# Patient Record
Sex: MALE | Race: WHITE | Employment: OTHER | URBAN - METROPOLITAN AREA
[De-identification: names, ages, dates, MRNs, and addresses within clinical notes are randomized per-mention and may not be internally consistent; named-entity substitution may affect disease eponyms.]

---

## 2017-03-09 ENCOUNTER — IP HISTORICAL/CONVERTED ENCOUNTER (OUTPATIENT)
Dept: OTHER | Age: 62
End: 2017-03-09

## 2017-07-20 ENCOUNTER — IP HISTORICAL/CONVERTED ENCOUNTER (OUTPATIENT)
Dept: OTHER | Age: 62
End: 2017-07-20

## 2017-09-22 ENCOUNTER — IP HISTORICAL/CONVERTED ENCOUNTER (OUTPATIENT)
Dept: OTHER | Age: 62
End: 2017-09-22

## 2018-03-05 ENCOUNTER — IP HISTORICAL/CONVERTED ENCOUNTER (OUTPATIENT)
Dept: OTHER | Age: 63
End: 2018-03-05

## 2018-04-04 ENCOUNTER — IP HISTORICAL/CONVERTED ENCOUNTER (OUTPATIENT)
Dept: OTHER | Age: 63
End: 2018-04-04

## 2018-09-19 ENCOUNTER — OP HISTORICAL/CONVERTED ENCOUNTER (OUTPATIENT)
Dept: OTHER | Age: 63
End: 2018-09-19

## 2018-11-07 ENCOUNTER — ED HISTORICAL/CONVERTED ENCOUNTER (OUTPATIENT)
Dept: OTHER | Age: 63
End: 2018-11-07

## 2019-08-05 ENCOUNTER — ED HISTORICAL/CONVERTED ENCOUNTER (OUTPATIENT)
Dept: OTHER | Age: 64
End: 2019-08-05

## 2019-09-05 ENCOUNTER — OP HISTORICAL/CONVERTED ENCOUNTER (OUTPATIENT)
Dept: OTHER | Age: 64
End: 2019-09-05

## 2019-09-16 ENCOUNTER — IP HISTORICAL/CONVERTED ENCOUNTER (OUTPATIENT)
Dept: OTHER | Age: 64
End: 2019-09-16

## 2019-09-25 ENCOUNTER — IP HISTORICAL/CONVERTED ENCOUNTER (OUTPATIENT)
Dept: OTHER | Age: 64
End: 2019-09-25

## 2020-05-27 ENCOUNTER — OP HISTORICAL/CONVERTED ENCOUNTER (OUTPATIENT)
Dept: OTHER | Age: 65
End: 2020-05-27

## 2020-06-12 ENCOUNTER — OP HISTORICAL/CONVERTED ENCOUNTER (OUTPATIENT)
Dept: OTHER | Age: 65
End: 2020-06-12

## 2020-06-16 ENCOUNTER — OP HISTORICAL/CONVERTED ENCOUNTER (OUTPATIENT)
Dept: OTHER | Age: 65
End: 2020-06-16

## 2020-06-22 ENCOUNTER — IP HISTORICAL/CONVERTED ENCOUNTER (OUTPATIENT)
Dept: OTHER | Age: 65
End: 2020-06-22

## 2020-08-17 ENCOUNTER — OP HISTORICAL/CONVERTED ENCOUNTER (OUTPATIENT)
Dept: OTHER | Age: 65
End: 2020-08-17

## 2020-08-24 ENCOUNTER — OP HISTORICAL/CONVERTED ENCOUNTER (OUTPATIENT)
Dept: OTHER | Age: 65
End: 2020-08-24

## 2020-08-31 ENCOUNTER — OP HISTORICAL/CONVERTED ENCOUNTER (OUTPATIENT)
Dept: OTHER | Age: 65
End: 2020-08-31

## 2020-09-14 ENCOUNTER — HOSPITAL ENCOUNTER (OUTPATIENT)
Dept: WOUND CARE | Age: 65
Discharge: HOME | End: 2020-09-14
Payer: MEDICARE

## 2020-09-14 PROBLEM — L97.929 IDIOPATHIC CHRONIC VENOUS HYPERTENSION OF LEFT LOWER EXTREMITY WITH ULCER (HCC): Status: ACTIVE | Noted: 2020-09-14

## 2020-09-14 PROBLEM — I87.312 IDIOPATHIC CHRONIC VENOUS HYPERTENSION OF LEFT LOWER EXTREMITY WITH ULCER (HCC): Status: ACTIVE | Noted: 2020-09-14

## 2020-09-14 PROCEDURE — 99212 OFFICE O/P EST SF 10 MIN: CPT

## 2020-09-14 RX ORDER — INSULIN GLARGINE 100 [IU]/ML
INJECTION, SOLUTION SUBCUTANEOUS
COMMUNITY
End: 2022-03-02

## 2020-09-14 RX ORDER — OXYCODONE HYDROCHLORIDE 5 MG/1
5 TABLET ORAL EVERY 12 HOURS
COMMUNITY
End: 2022-07-29

## 2020-09-14 RX ORDER — FUROSEMIDE 40 MG/1
40 TABLET ORAL DAILY
COMMUNITY
End: 2021-03-08

## 2020-09-14 RX ORDER — INSULIN LISPRO 100 [IU]/ML
INJECTION, SOLUTION INTRAVENOUS; SUBCUTANEOUS
COMMUNITY

## 2020-09-14 RX ORDER — GABAPENTIN 300 MG/1
300 CAPSULE ORAL 4 TIMES DAILY
COMMUNITY

## 2020-09-14 RX ORDER — FLUNISOLIDE 0.25 MG/ML
2 SOLUTION NASAL 2 TIMES DAILY
COMMUNITY

## 2020-09-14 RX ORDER — ALBUTEROL SULFATE 90 UG/1
1 AEROSOL, METERED RESPIRATORY (INHALATION)
COMMUNITY

## 2020-09-14 RX ORDER — GUAIFENESIN 100 MG/5ML
81 LIQUID (ML) ORAL DAILY
COMMUNITY

## 2020-09-14 RX ORDER — METFORMIN HYDROCHLORIDE 500 MG/1
1000 TABLET ORAL 2 TIMES DAILY WITH MEALS
COMMUNITY
End: 2022-07-06

## 2020-09-14 RX ORDER — HYDROCHLOROTHIAZIDE 25 MG/1
25 TABLET ORAL DAILY
COMMUNITY
End: 2021-06-09

## 2020-09-14 RX ORDER — AMLODIPINE BESYLATE 5 MG/1
5 TABLET ORAL DAILY
COMMUNITY
End: 2021-03-08

## 2020-09-14 RX ORDER — COLCHICINE 0.6 MG/1
0.6 TABLET ORAL DAILY
COMMUNITY

## 2020-09-14 NOTE — DISCHARGE INSTRUCTIONS
Discharge Instructions for  [facility], [facility]  Telephone: [phone]    NAME:  Rhys Orozco OF BIRTH:  1955  MEDICAL RECORD NUMBER:  [id number]  DATE:  9/14/2020    Wound Cleansing:   Do not scrub or use excessive force. Cleanse wound prior to applying a clean dressing with:  [] Normal Saline [] Keep Wound Dry in Shower    [] Wound Cleanser   [x] Cleanse wound with Mild Soap & Water  [] May Shower at Discharge   [] Other:           Dressings:           Wound Location Left Leg  [] Apply Primary Dressing:       -Dakins Wet to Dry Dressing  [x] Cover and Secure with:     [x] Gauze [x] Cristian [] Kerlix   [x] Ace Wrap [] Cover Roll Tape [] ABD     [] Other:    Avoid contact of tape with skin. [x] Change dressing: [x] Daily    [] Every Other Day [] Three times per week   [] Once a week [] Do Not Change Dressing   [] Other:     Activity:  [x] Activity as tolerated:  [] Patient has no activity restrictions     [] Strict Bedrest: [] Remain off Work:     [] May return to full duty work:                                   [] Return to work with restrictions:             Return Appointment:  [] Wound and dressing supply provider:   [x] SELAM or [address]   [x] Return Appointment: With Dr. Edin Neil  in 1Week     Electronically signed Saman Godinez RN on 9/14/2020 at 2:29 PM     Kiah Mathews 281: Should you experience any significant changes in your wound(s) or have questions about your wound care, please contact the [address] [place] [address] at [address] [place] [address] 8:00 am - 4:30. If you need help with your wound outside these hours and cannot wait until we are again available, contact your PCP or go to the hospital emergency room. PLEASE NOTE: IF YOU ARE UNABLE TO OBTAIN WOUND SUPPLIES, CONTINUE TO USE THE SUPPLIES YOU HAVE AVAILABLE UNTIL YOU ARE ABLE TO REACH US.  IT IS MOST IMPORTANT TO KEEP THE WOUND COVERED AT ALL TIMES.     [x] Dr. Bacilio Brown   [] Dr. Jerson Hernandez  [] [facility]  [] Dr. Hina Fox

## 2020-09-14 NOTE — WOUND CARE
[address]   Progress Note and Procedure Note   NO Procedure      [address] RECORD NUMBER:  [id number]  AGE: 71 y.o. RACE WHITE OR   GENDER: male  : 1955  EPISODE DATE:  2020    Subjective:     Chief Complaint   Patient presents with    Wound Check         HISTORY of PRESENT ILLNESS HPI    [email or web address] Jovanni Cook is a 71 y.o. male who presents today for wound/ulcer evaluation. 72 yo diabetic male with L lateral leg venous ulcer. He was hospitalized at [facility] from 20 to 2020 fro treatment of LLE cellulitis. He was treated with IV antibiotics, discharged on Augmentin. One month ago he was treated with doxycycline for the L leg ulcer. A1c 2020 6.9. About two years ago, he underwent venous ablation of the LLE by Dr. Rhett Ellsworth. Wound/Ulcer Pain Timing/Severity: moderate  Quality of pain: burning  Severity:  5 / 10   Modifying Factors: Pain worsens with walking  Associated Signs/Symptoms: edema    Ulcer Identification:  Ulcer Type: non-healing surgical    Contributing Factors: edema    Wound: L leg         PAST MEDICAL HISTORY    No past medical history on file. PAST SURGICAL HISTORY    No past surgical history on file. FAMILY HISTORY    No family history on file. SOCIAL HISTORY    Social History     Tobacco Use    Smoking status: Not on file   Substance Use Topics    Alcohol use: Not on file    Drug use: Not on file       ALLERGIES    Allergies   Allergen Reactions    Elavil Unknown (comments)    Sulfa (Sulfonamide Antibiotics) Nausea and Vomiting       MEDICATIONS    No current outpatient medications on file prior to encounter. No current facility-administered medications on file prior to encounter. REVIEW OF SYSTEMS    Pertinent items are noted in HPI. Objective:     [email or web address] were no vitals taken for this visit.     Wt Readings from Last 3 Encounters:   No data found for [employer] PHYSICAL EXAM    Pertinent items are noted in HPI. Assessment:      Problem List Items Addressed This Visit     None          Procedure Note  Indications:  Based on my examination of this patient's wound(s)/ulcer(s) today, debridement is not required to promote healing and evaluate the wound base. Wound Leg lower Left;Lateral (Active)   Dressing Status Clean, dry, and intact; Removed 09/14/20 1413   Dressing Type 4 x 4 09/14/20 1413   Non-staged Wound Description Full thickness 09/14/20 1413   Wound Length (cm) 6.1 cm 09/14/20 1413   Wound Width (cm) 4.2 cm 09/14/20 1413   Wound Depth (cm) 0.2 cm 09/14/20 1413   Wound Surface Area (cm^2) 25.62 cm^2 09/14/20 1413   Wound Volume (cm^3) 5.12 cm^3 09/14/20 1413   Condition of Base Granulation;Slough 09/14/20 1413   Drainage Amount Large 09/14/20 1413   Drainage Color Serosanguinous 09/14/20 1413   Wound Odor None 09/14/20 1413   Margins Defined edges 09/14/20 1413   Cleansing and Cleansing Agents  Soap and water 09/14/20 1413   Number of days: 0        Plan:     Treatment Note please see attached Discharge Instructions    Written patient dismissal instructions given to patient or POA.          Electronically signed by Yancy Talavera MD on 9/14/2020 at 2:30 PM

## 2020-09-21 ENCOUNTER — HOSPITAL ENCOUNTER (OUTPATIENT)
Dept: WOUND CARE | Age: 65
Discharge: HOME | End: 2020-09-21
Payer: MEDICARE

## 2020-09-21 VITALS — TEMPERATURE: 98.5 F | SYSTOLIC BLOOD PRESSURE: 124 MMHG | DIASTOLIC BLOOD PRESSURE: 75 MMHG | HEART RATE: 99 BPM

## 2020-09-21 PROBLEM — R60.0 LOCALIZED EDEMA: Status: ACTIVE | Noted: 2020-09-21

## 2020-09-21 PROCEDURE — 11045 DBRDMT SUBQ TISS EACH ADDL: CPT

## 2020-09-21 PROCEDURE — 11042 DBRDMT SUBQ TIS 1ST 20SQCM/<: CPT

## 2020-09-21 NOTE — WOUND CARE
Debridement Wound Care Problem List Items Addressed This Visit None Right ankle ulcer Procedure Note Indications:  Based on my examination of this patient's wound(s)/ulcer(s) today, debridement is required to promote healing and evaluate the wound base. Performed by: Timbo Fierro MD 
 
Consent obtained: Yes Time out taken: Yes Debridement: Excisional 
 
Using curette the wound(s)/ulcer(s) was/were sharply debrided down through and including the removal of   
subcutaneous tissue Devitalized Tissue Debrided: slough Pre Debridement Measurements: 
Are located in the [place]  Documentation Flow Sheet Wound/Ulcer #: 1 Post Debridement Measurements: 
Wound/Ulcer Descriptions are Pre Debridement except measurements:Non-Pressure ulcer, fat layer exposed Wound Leg lower Left;Lateral (Active) Dressing Status Clean;Dry; Intact 09/21/20 1409 Dressing Type 4 x 4;ABD pad; Wet to dry 09/21/20 1409 Non-staged Wound Description Full thickness 09/21/20 1409 Rubalcava Grading Scale 0-5  Grade 2 09/21/20 1409 Wound Length (cm) 5.5 cm 09/21/20 1409 Wound Width (cm) 4.8 cm 09/21/20 1409 Wound Depth (cm) 0.2 cm 09/21/20 1409 Wound Surface Area (cm^2) 26.4 cm^2 09/21/20 1409 Wound Volume (cm^3) 5.28 cm^3 09/21/20 1409 Change in Wound Size % -3.04 09/21/20 1409 Condition of Base Granulation;Slough 09/14/20 1413 Assessment Clean, dry, and intact 09/21/20 1409 Tissue Type Percent Pink 15 09/21/20 1409 Tissue Type Percent Red 85 09/21/20 1409 Drainage Amount Moderate 09/21/20 1409 Drainage Color Serosanguinous 09/21/20 1409 Wound Odor None 09/21/20 1409 Alissa-wound Assessment Dry; Intact 09/21/20 1409 Margins Attached edges 09/21/20 1409 Cleansing and Cleansing Agents  Normal saline 09/21/20 1409 Dressing Changed Changed/New 09/14/20 1435 Number of days: 7 Percent of Wound(s)/Ulcer(s) Debrided: 90% Total Surface Area Debrided:  25 sq cm Diabetic/Pressure/Non Pressure Ulcers only: 
Ulcer:  
 
Estimated Blood Loss:  None Hemostasis Achieved: Pressure Procedural Pain: 2 / 10 Post Procedural Pain: 2 / 10 Response to treatment: Well tolerated by patient

## 2020-09-21 NOTE — DISCHARGE INSTRUCTIONS
Discharge Instructions for  [address] [place] [address], [address] [place] [address]  Telephone: [phone]    NAME:  Mary Liu OF BIRTH:  1955  MEDICAL RECORD NUMBER:  [id number]  DATE:  9/21/2020      Return Appointment:  [] Dressing supply provider:   [x] Home Healthcare: [facility]  [x] Return Appointment: 1 Week(s)  [] Nurse Visit: Raya Singleton    [] Ordered tests:   [] Referrals:     Wound Cleansing:   Do not scrub or use excessive force. Cleanse wound prior to applying a clean dressing with:  [] Normal Saline   [x] Cleanse wound with Mild Soap & Water    [] Keep Wound Dry in Shower  [] Other:      Topical Treatments:  Do not apply lotions, creams, or ointments to wound bed unless directed. [x] Apply moisturizing lotion to skin surrounding the wound prior to dressing change.  [] Apply antifungal ointment to skin surrounding the wound prior to dressing change.  [] Apply thin film of moisture barrier ointment to skin immediately around wound. [] Other:       Dressings:           Wound Location LLE  [x] Apply Primary Dressing:       [] MediHoney Gel    [] MediHoney Alginate               [] Calcium Alginate      [] Calcium Alginate with Silver   [] Collagen                   [] Collagen with Silver                [] Santyl with Moistened saline gauze              [x] Hydrofera Blue (cut to size and moistened)  [] Hydrofera Blue Ready (Cut to size)   [] NS wet to dry    [] Betadine wet to dry              [] Hydrogel                [] Mepitel     [] Bactroban/Mupirocin               [] Other:      [x] Cover and Secure with:     [x] Gauze [x] Cristian [] Kerlix   [] Foam [] Super Absorbant  [] ABD     [x] ACE Wrap            [] Other:    Avoid contact of tape with skin.     [x] Change dressing: [] Daily    [] Every Other Day [] Once per week   [x] Three times per week [] Do Not Change Dressing   [] Other: Activity:  [x] Activity as tolerated:    [] Patient has no activity restrictions       [] Strict Bedrest:   [] Remain off Work:       [] May return to full duty work:                                     [] Return to work with restrictions:                Electronically signed Merced Bravo RN on 9/21/2020 at 2:29 PM     Kiah Mathews 281: Should you experience any significant changes in your wound(s) or have questions about your wound care, please contact [address] at [place] [address] 8:00 am - 4:30. If you need help with your wound outside of these hours and cannot wait until we are again available, contact your PCP or go to the hospital emergency room. PLEASE NOTE: IF YOU ARE UNABLE TO OBTAIN WOUND SUPPLIES, CONTINUE TO USE THE SUPPLIES YOU HAVE AVAILABLE UNTIL YOU ARE ABLE TO REACH US. IT IS MOST IMPORTANT TO KEEP THE WOUND COVERED AT ALL TIMES.     [x] Dr. Terri Lopez   [] Dr. Richard Murray  [] D[facility]  [] Dr. Tod Mccullough

## 2020-09-21 NOTE — WOUND CARE
[facility] Progress Note and Procedure Note Willie Frederick MEDICAL RECORD NUMBER:  [id number] AGE: 71 y.o. RACE WHITE OR   GENDER: male  : 1955 EPISODE DATE:  2020 Subjective: Chief Complaint Patient presents with  Wound Check LLL Patient has completed his antibiotics, which caused him to have GI upset so he only took 4 days worth. He has been continuing to treat the wound with Dakin solution daily dressing changes. He denies fever, increased pain or increased drainage. HISTORY of PRESENT ILLNESS HPI Willie Frederick is a 71 y.o. male who presents today for wound/ulcer evaluation. History of Wound Context: Wound is the right medial ankle Wound/Ulcer Pain Timing/Severity: moderate Quality of pain: aching Severity:  3 / 10 Modifying Factors: Pain is relieved/improved with rest 
Associated Signs/Symptoms: edema Ulcer Identification: 
Ulcer Type: venous Contributing Factors: edema Wound: R medial ankle PAST MEDICAL HISTORY Past Medical History:  
Diagnosis Date  Arthritis  Chronic obstructive pulmonary disease ([address]  Diabetes [facility] [address]  Gout  Hypertension  Sleep apnea PAST SURGICAL HISTORY Past Surgical History:  
Procedure Laterality Date  HX ORTHOPAEDIC    
 
 
FAMILY HISTORY Family History Problem Relation Age of Onset  Heart Disease Mother  Kidney Disease Mother  Hypertension Mother  Diabetes Mother  Heart Disease Father  Hypertension Father  Diabetes Sister  Diabetes Brother SOCIAL HISTORY Social History Tobacco Use  Smoking status: Never Smoker  Smokeless tobacco: Never Used Substance Use Topics  Alcohol use: Yes  Drug use: Never ALLERGIES Allergies Allergen Reactions  Elavil Unknown (comments)  Sulfa (Sulfonamide Antibiotics) Nausea and Vomiting MEDICATIONS Current Outpatient Medications on File Prior to Encounter Medication Sig Dispense Refill  aspirin 81 mg chewable tablet Take 81 mg by mouth daily.  flunisolide (NASAREL) 25 mcg (0.025 %) spry 2 Sprays two (2) times a day.  albuterol (ProAir HFA) 90 mcg/actuation inhaler Take  by inhalation.  colchicine 0.6 mg tablet Take 0.6 mg by mouth daily.  gabapentin (NEURONTIN) 300 mg capsule Take 300 mg by mouth four (4) times daily.  oxyCODONE IR (ROXICODONE) 5 mg immediate release tablet Take 5 mg by mouth every twelve (12) hours.  furosemide (Lasix) 40 mg tablet Take 40 mg by mouth daily.  amLODIPine (NORVASC) 5 mg tablet Take 5 mg by mouth daily.  metFORMIN (GLUCOPHAGE) 500 mg tablet Take 1,000 mg by mouth two (2) times daily (with meals).  hydroCHLOROthiazide (HYDRODIURIL) 25 mg tablet Take 25 mg by mouth daily.  insulin lispro (HumaLOG U-100 Insulin) 100 unit/mL injection by SubCUTAneous route. Sliding scale  insulin glargine (LANTUS) 100 unit/mL injection by SubCUTAneous route nightly. 80units am/ 20units pm    
 
No current facility-administered medications on file prior to encounter. REVIEW OF SYSTEMS Pertinent items are noted in HPI. Objective:  
 
Visit Vitals /75 (BP 1 Location: Left arm, BP Patient Position: Sitting) Pulse 99 Temp 98.5 °F (36.9 °C) Wt Readings from Last 3 Encounters:  
No data found for [employer] PHYSICAL EXAM 
Right medial ankle ulcer is measuring slightly smaller today, healthy granulation tissue, no evidence of infection. Pertinent items are noted in HPI. Assessment:  
Slowly improving Problem List Items Addressed This Visit None POP IN PROCEDURE TYPE (DEBRIDEMENT, BIOPSY, I&D, SKIN SUB, CHEMICAL CAUERTY) Plan:  
Discontinue Dakin solution, begin treatment with Hydrofera Blue, Ace wrap compression. Treatment Note please see attached Discharge Instructions Written patient dismissal instructions given to patient or POA.       
 
Electronically signed by Vika Claros MD on 9/21/2020 at 2:29 PM

## 2020-09-28 ENCOUNTER — HOSPITAL ENCOUNTER (OUTPATIENT)
Dept: WOUND CARE | Age: 65
Discharge: HOME | End: 2020-09-28
Payer: MEDICARE

## 2020-09-28 VITALS
OXYGEN SATURATION: 99 % | RESPIRATION RATE: 20 BRPM | DIASTOLIC BLOOD PRESSURE: 94 MMHG | SYSTOLIC BLOOD PRESSURE: 141 MMHG | TEMPERATURE: 99.3 F | HEART RATE: 101 BPM

## 2020-09-28 DIAGNOSIS — L97.929 IDIOPATHIC CHRONIC VENOUS HYPERTENSION OF LEFT LOWER EXTREMITY WITH ULCER (HCC): ICD-10-CM

## 2020-09-28 DIAGNOSIS — I87.312 IDIOPATHIC CHRONIC VENOUS HYPERTENSION OF LEFT LOWER EXTREMITY WITH ULCER (HCC): ICD-10-CM

## 2020-09-28 DIAGNOSIS — R60.0 LOCALIZED EDEMA: ICD-10-CM

## 2020-09-28 PROCEDURE — 11045 DBRDMT SUBQ TISS EACH ADDL: CPT

## 2020-09-28 PROCEDURE — 11042 DBRDMT SUBQ TIS 1ST 20SQCM/<: CPT

## 2020-09-28 NOTE — WOUND CARE
[employer] Below Knee NAME:  Jh Guy YOB: 1955 MEDICAL RECORD NUMBER:  [id number] DATE:  9/28/2020 Appied primary and secondary dressing as ordered. Applied Unna roll from toes to knee overlapping each time. Applied ace wrap or coban from toes to below the knee. Secured with tape and/or metal clips covered with tape. Instructed patient/caregiver to keep dressing dry and intact. DO NOT REMOVE DRESSING. Instructed pt/family/caregiver to report excessive draining, loose bandage, wet dressing, severe pain or tingling in toes. Applied [employer] dressing below the knee to left lower leg. Unna Boot(s) were applied per  Guidelines.  
 
 Electronically signed by Irma Arreguin RN on 9/28/2020 at 3:02 PM

## 2020-09-28 NOTE — DISCHARGE INSTRUCTIONS
Discharge Instructions for  [facility], [facility]  Telephone: [phone]    NAME:  Vanesa Novak OF BIRTH:  1955  MEDICAL RECORD NUMBER:  [id number]  DATE:  9/28/2020      Return Appointment:  [] Dressing supply provider:   [x] Home Healthcare: [address] [facility]  [x] Return Appointment: 1 Week(s)  [] Nurse Visit: Nery Mcpherson    [] Ordered tests:   [] Referrals:     Wound Cleansing:   Do not scrub or use excessive force. Cleanse wound prior to applying a clean dressing with:  [] Normal Saline   [x] Cleanse wound with Mild Soap & Water    [] Keep Wound Dry in Shower  [] Other:      Topical Treatments:  Do not apply lotions, creams, or ointments to wound bed unless directed. [x] Apply moisturizing lotion to skin surrounding the wound prior to dressing change.  [] Apply antifungal ointment to skin surrounding the wound prior to dressing change.  [] Apply thin film of moisture barrier ointment to skin immediately around wound. [] Other:       Dressings:           Wound Location LLE  [x] Apply Primary Dressing:       [] MediHoney Gel    [] MediHoney Alginate               [] Calcium Alginate      [] Calcium Alginate with Silver   [] Collagen                   [] Collagen with Silver                [] Santyl with Moistened saline gauze              [x] Hydrofera Blue (cut to size and moistened)  [] Hydrofera Blue Ready (Cut to size)   [] NS wet to dry    [] Betadine wet to dry              [] Hydrogel                [] Mepitel     [] Bactroban/Mupirocin               [] Other:      [x] Cover and Secure with:     [x] Gauze [] Larry Peals [] Kerlix   [] Foam [x] Super Absorbant  [] ABD     [] ACE Wrap            [] Other:    Avoid contact of tape with skin.     [x] Change dressing: [] Daily    [] Every Other Day [x] Twice per week   [] Three times per week [] Do Not Change Dressing   [] Other:      Compression:  Apply: [x] Multilayer Compression Wrap Applied in Clinic: []RightLeg [x]Left Leg                                 []Profore   []Profore Lite             [x]Unna     [x] Do not get leg(s) with wrap wet. If wraps become too tight call the center or completely remove the wrap. [x] Elevate leg(s) above the level of the heart when sitting. [x] Avoid prolonged standing in one place. Dietary:  [x] Diet as tolerated: [] Calorie Diabetic Diet: [] No Added Salt:  [] Increase Protein: [] Other:     Activity:  [x] Activity as tolerated:    [] Patient has no activity restrictions       [] Strict Bedrest:   [] Remain off Work:       [] May return to full duty work:                                     [] Return to work with restrictions:                Electronically signed Ruth Arevalo RN on 9/28/2020 at 2:35 PM     Kiah Mathews 281: Should you experience any significant changes in your wound(s) or have questions about your wound care, please contact [address] at [place] [address] 8:00 am - 4:30. If you need help with your wound outside of these hours and cannot wait until we are again available, contact your PCP or go to the hospital emergency room. PLEASE NOTE: IF YOU ARE UNABLE TO OBTAIN WOUND SUPPLIES, CONTINUE TO USE THE SUPPLIES YOU HAVE AVAILABLE UNTIL YOU ARE ABLE TO REACH US. IT IS MOST IMPORTANT TO KEEP THE WOUND COVERED AT ALL TIMES.     [x] Dr. Ok Culp   [] Dr. Di Mccoy  [] Shanda Crane [facility]  [] Dr. Ailin Cole

## 2020-09-28 NOTE — WOUND CARE
[address] Progress Note and Procedure Note Aaron George MEDICAL RECORD NUMBER:  [id number] AGE: 71 y.o. RACE WHITE OR   GENDER: male  : 1955 EPISODE DATE:  2020 Subjective:  
Some drainage, mild pain Did not tolerate Bactrim for Morganella on wound culture Chief Complaint Patient presents with  Wound Check LLE HISTORY of PRESENT ILLNESS HPI Aaron George is a 71 y.o. male who presents today for wound/ulcer evaluation. History of Wound Context: L leg ulcer Wound/Ulcer Pain Timing/Severity: mild Quality of pain: dull Severity:  2 / 10 Modifying Factors: Pain is relieved/improved with rest 
Associated Signs/Symptoms: edema Ulcer Identification: 
Ulcer Type: venous Contributing Factors: edema Wound: L leg PAST MEDICAL HISTORY Past Medical History:  
Diagnosis Date  Arthritis  Chronic obstructive pulmonary disease ([facility] [address]  Diabetes ([facility] [address]  Gout  Hypertension  Sleep apnea PAST SURGICAL HISTORY Past Surgical History:  
Procedure Laterality Date  HX ORTHOPAEDIC    
 
 
FAMILY HISTORY Family History Problem Relation Age of Onset  Heart Disease Mother  Kidney Disease Mother  Hypertension Mother  Diabetes Mother  Heart Disease Father  Hypertension Father  Diabetes Sister  Diabetes Brother SOCIAL HISTORY Social History Tobacco Use  Smoking status: Never Smoker  Smokeless tobacco: Never Used Substance Use Topics  Alcohol use: Yes  Drug use: Never ALLERGIES Allergies Allergen Reactions  Elavil Unknown (comments)  Sulfa (Sulfonamide Antibiotics) Nausea and Vomiting MEDICATIONS Current Outpatient Medications on File Prior to Encounter Medication Sig Dispense Refill  aspirin 81 mg chewable tablet Take 81 mg by mouth daily.  flunisolide (NASAREL) 25 mcg (0.025 %) spry 2 Sprays two (2) times a day.  albuterol (ProAir HFA) 90 mcg/actuation inhaler Take  by inhalation.  colchicine 0.6 mg tablet Take 0.6 mg by mouth daily.  gabapentin (NEURONTIN) 300 mg capsule Take 300 mg by mouth four (4) times daily.  oxyCODONE IR (ROXICODONE) 5 mg immediate release tablet Take 5 mg by mouth every twelve (12) hours.  furosemide (Lasix) 40 mg tablet Take 40 mg by mouth daily.  amLODIPine (NORVASC) 5 mg tablet Take 5 mg by mouth daily.  metFORMIN (GLUCOPHAGE) 500 mg tablet Take 1,000 mg by mouth two (2) times daily (with meals).  hydroCHLOROthiazide (HYDRODIURIL) 25 mg tablet Take 25 mg by mouth daily.  insulin lispro (HumaLOG U-100 Insulin) 100 unit/mL injection by SubCUTAneous route. Sliding scale  insulin glargine (LANTUS) 100 unit/mL injection by SubCUTAneous route nightly. 80units am/ 20units pm    
 
No current facility-administered medications on file prior to encounter. REVIEW OF SYSTEMS Pertinent items are noted in HPI. Objective:  
 
Visit Vitals BP (!) 141/94 (BP 1 Location: Right arm, BP Patient Position: Sitting) Pulse (!) 101 Temp 99.3 °F (37.4 °C) Resp 20 SpO2 99% Comment: on 3 liters Wt Readings from Last 3 Encounters:  
No data found for [employer] PHYSICAL EXAM 
Wound has a healthy appearance, no infection, area slightly increased Pertinent items are noted in HPI. Assessment:  
 no significant improvement Problem List Items Addressed This Visit Idiopathic chronic venous hypertension of left lower extremity with ulcer ([facility] [address] Localized edema POP IN PROCEDURE TYPE (DEBRIDEMENT, BIOPSY, I&D, SKIN SUB, CHEMICAL CAUERTY) Plan:  
Continue HydroFera Blue, start Unna boot, change once a week with [facility], once a week here Leg elevation Treatment Note please see attached Discharge Instructions Written patient dismissal instructions given to patient or POA. Electronically signed by Clotilde Hernandes MD on 9/28/2020 at 2:41 PM 
 
 
 
 
 
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 Performed by: Clotilde Hernandes MD 
 
Consent obtained: Yes Time out taken: Yes Debridement: Excisional 
 
Using curette the wound(s)/ulcer(s) was/were sharply debrided down through and including the removal of   
subcutaneous tissue Devitalized Tissue Debrided: slough Pre Debridement Measurements: 
Are located in the [place]  Documentation Flow Sheet Wound/Ulcer #: 1 Post Debridement Measurements: 
Wound/Ulcer Descriptions are Pre Debridement except measurements:Non-Pressure ulcer, fat layer exposed Wound Leg lower Left;Lateral (Active) Dressing Status Clean, dry, and intact 09/28/20 1414 Dressing Type 4 x 4;ABD pad; Wet to dry 09/21/20 1409 Non-staged Wound Description Full thickness 09/28/20 1414 Rubalcava Grading Scale 0-5  Grade 2 09/28/20 1414 Wound Length (cm) 6 cm 09/28/20 1414 Wound Width (cm) 4.5 cm 09/28/20 1414 Wound Depth (cm) 0.2 cm 09/28/20 1414 Wound Surface Area (cm^2) 27 cm^2 09/28/20 1414 Wound Volume (cm^3) 5.4 cm^3 09/28/20 1414 Change in Wound Size % -5.39 09/28/20 1414 Condition of Base Granulation;Slough 09/14/20 1413 Assessment Clean, dry, and intact 09/28/20 1414 Tissue Type Percent Pink 5 09/28/20 1414 Tissue Type Percent Red 95 09/28/20 1414 Drainage Amount Moderate 09/28/20 1414 Drainage Color Serosanguinous 09/28/20 1414 Wound Odor None 09/28/20 1414 Alissa-wound Assessment Dry; Intact 09/28/20 1414 Margins Attached edges 09/28/20 1414 Cleansing and Cleansing Agents  Normal saline 09/28/20 1414 Dressing Changed Changed/New 09/28/20 1414 Number of days: 14 Percent of Wound(s)/Ulcer(s) Debrided: 70% Total Surface Area Debrided:  27 sq cm Diabetic/Pressure/Non Pressure Ulcers only: 
Ulcer:  
 
Estimated Blood Loss:  Minimal  
 
Hemostasis Achieved: Pressure Procedural Pain: 1 / 10 Post Procedural Pain: 1 / 10 Response to treatment: Well tolerated by patient

## 2020-10-05 ENCOUNTER — HOSPITAL ENCOUNTER (OUTPATIENT)
Dept: WOUND CARE | Age: 65
Discharge: HOME | End: 2020-10-05
Payer: MEDICARE

## 2020-10-05 VITALS
OXYGEN SATURATION: 98 % | RESPIRATION RATE: 18 BRPM | SYSTOLIC BLOOD PRESSURE: 125 MMHG | DIASTOLIC BLOOD PRESSURE: 73 MMHG | HEART RATE: 97 BPM | TEMPERATURE: 97.8 F

## 2020-10-05 DIAGNOSIS — R60.0 LOCALIZED EDEMA: ICD-10-CM

## 2020-10-05 DIAGNOSIS — L97.929 IDIOPATHIC CHRONIC VENOUS HYPERTENSION OF LEFT LOWER EXTREMITY WITH ULCER (HCC): ICD-10-CM

## 2020-10-05 DIAGNOSIS — I87.312 IDIOPATHIC CHRONIC VENOUS HYPERTENSION OF LEFT LOWER EXTREMITY WITH ULCER (HCC): ICD-10-CM

## 2020-10-05 PROCEDURE — 29580 STRAPPING UNNA BOOT: CPT

## 2020-10-05 NOTE — WOUND CARE
[employer] Below Knee NAME:  Adrian Kay YOB: 1955 MEDICAL RECORD NUMBER:  [id number] DATE:  10/5/2020 Remove old Unna Boot or Boots. Appied primary and secondary dressing as ordered. Applied Unna roll from toes to knee overlapping each time. Applied ace wrap or coban from toes to below the knee. Secured with tape and/or metal clips covered with tape. Instructed patient/caregiver to keep dressing dry and intact. DO NOT REMOVE DRESSING. Instructed pt/family/caregiver to report excessive draining, loose bandage, wet dressing, severe pain or tingling in toes. Applied [place] dressing below the knee to left lower leg. Unna Boot(s) were applied per  Guidelines.  
 
 Electronically signed by Mateus Soto RN on 10/5/2020 at 3:05 PM

## 2020-10-05 NOTE — WOUND CARE
[address] Progress Note and Procedure Note NO Procedure Alicia Jarvis MEDICAL RECORD NUMBER:  [id number] AGE: 71 y.o. RACE WHITE [place]   GENDER: male  : 1955 EPISODE DATE:  10/5/2020 Subjective: No new problems reported Chief Complaint Patient presents with  Wound Check LLL HISTORY of PRESENT ILLNESS HPI Alicia Jarvis is a 71 y.o. male who presents today for wound/ulcer evaluation. History of Wound Context: L leg venous ulcer Wound/Ulcer Pain Timing/Severity: mild Quality of pain: dull Severity:  2 / 10 Modifying Factors: Pain is relieved/improved with rest 
Associated Signs/Symptoms: edema Ulcer Identification: 
Ulcer Type: venous Contributing Factors: lymphedema Wound: L leg PAST MEDICAL HISTORY Past Medical History:  
Diagnosis Date  Arthritis  Chronic obstructive pulmonary disease ([facility] [address]  Diabetes ([facility] [address]  Gout  Hypertension  Sleep apnea PAST SURGICAL HISTORY Past Surgical History:  
Procedure Laterality Date  HX ORTHOPAEDIC    
 
 
FAMILY HISTORY Family History Problem Relation Age of Onset  Heart Disease Mother  Kidney Disease Mother  Hypertension Mother  Diabetes Mother  Heart Disease Father  Hypertension Father  Diabetes Sister  Diabetes Brother SOCIAL HISTORY Social History Tobacco Use  Smoking status: Never Smoker  Smokeless tobacco: Never Used Substance Use Topics  Alcohol use: Yes  Drug use: Never ALLERGIES Allergies Allergen Reactions  Elavil Unknown (comments)  Sulfa (Sulfonamide Antibiotics) Nausea and Vomiting MEDICATIONS Current Outpatient Medications on File Prior to Encounter Medication Sig Dispense Refill  aspirin 81 mg chewable tablet Take 81 mg by mouth daily.  flunisolide (NASAREL) 25 mcg (0.025 %) spry 2 Sprays two (2) times a day.  albuterol (ProAir HFA) 90 mcg/actuation inhaler Take  by inhalation.  colchicine 0.6 mg tablet Take 0.6 mg by mouth daily.  gabapentin (NEURONTIN) 300 mg capsule Take 300 mg by mouth four (4) times daily.  oxyCODONE IR (ROXICODONE) 5 mg immediate release tablet Take 5 mg by mouth every twelve (12) hours.  furosemide (Lasix) 40 mg tablet Take 40 mg by mouth daily.  amLODIPine (NORVASC) 5 mg tablet Take 5 mg by mouth daily.  metFORMIN (GLUCOPHAGE) 500 mg tablet Take 1,000 mg by mouth two (2) times daily (with meals).  hydroCHLOROthiazide (HYDRODIURIL) 25 mg tablet Take 25 mg by mouth daily.  insulin lispro (HumaLOG U-100 Insulin) 100 unit/mL injection by SubCUTAneous route. Sliding scale  insulin glargine (LANTUS) 100 unit/mL injection by SubCUTAneous route nightly. 80units am/ 20units pm    
 
No current facility-administered medications on file prior to encounter. REVIEW OF SYSTEMS Pertinent items are noted in HPI. Objective:  
 
Visit Vitals /73 (BP 1 Location: Right arm, BP Patient Position: At rest;Sitting) Pulse 97 Temp 97.8 °F (36.6 °C) Resp 18 SpO2 98% Comment: 3L Wt Readings from Last 3 Encounters:  
No data found for [employer] PHYSICAL EXAM 
Wound measures slightly smaller, healthy granulation tissue, no evidence of infection Pertinent items are noted in HPI. Assessment:  
Some improvement Problem List Items Addressed This Visit Idiopathic chronic venous hypertension of left lower extremity with ulcer ([address] Localized edema Procedure Note Indications:  Based on my examination of this patient's wound(s)/ulcer(s) today, debridement is not required to promote healing and evaluate the wound base. Wound Leg lower Left;Lateral #1 (Active) Dressing Status Clean, dry, and intact 10/05/20 1413 Dressing Type 4 x 4;ABD pad; Wet to dry 09/21/20 1409 Non-staged Wound Description Full thickness 10/05/20 1413 Rubalcava Grading Scale 0-5  Grade 2 09/28/20 1414 Wound Length (cm) 5.9 cm 10/05/20 1413 Wound Width (cm) 4 cm 10/05/20 1413 Wound Depth (cm) 0.2 cm 10/05/20 1413 Wound Surface Area (cm^2) 23.6 cm^2 10/05/20 1413 Wound Volume (cm^3) 4.72 cm^3 10/05/20 1413 Change in Wound Size % 7.88 10/05/20 1413 Condition of Base Granulation;Slough 09/14/20 1413 Assessment Clean, dry, and intact 10/05/20 1413 Tissue Type Percent Pink 5 10/05/20 1413 Tissue Type Percent Red 95 10/05/20 1413 Drainage Amount Moderate 10/05/20 1413 Drainage Color Serosanguinous 10/05/20 1413 Wound Odor None 10/05/20 1413 Alissa-wound Assessment Dry; Intact 10/05/20 1413 Margins Attached edges 10/05/20 1413 Cleansing and Cleansing Agents  Soap and water 10/05/20 1413 Dressing Changed Changed/New 10/05/20 1413 Number of days: 21 Plan:  
Had Nancy to [place], continue [facility] Treatment Note please see attached Discharge Instructions Written patient dismissal instructions given to patient or POA.       
 
Electronically signed by Zachary Saavedra MD on 10/5/2020 at 2:36 PM

## 2020-10-05 NOTE — DISCHARGE INSTRUCTIONS
Discharge Instructions for  [address] [place] [address], [address] [place] [address]  Telephone: [phone]    NAME:  Gallo Fenton OF BIRTH:  1955  MEDICAL RECORD NUMBER:  [id number]  DATE:  10/5/2020      Return Appointment:  [] Dressing supply provider:   [x] [facility] [address] [facility]  [x] Return Appointment: 1 Week(s)  [] Nurse Visit: Chasidy Olson  [] Discharge from [facility]    [] Ordered tests:   [] Referrals:     Wound Cleansing:   Do not scrub or use excessive force. Cleanse wound prior to applying a clean dressing with:  [] Normal Saline   [x] Mild Soap & Water    [] Keep Wound Dry in Shower  [] Other:      Topical Treatments:  Do not apply lotions, creams, or ointments to wound bed unless directed. [x] Apply moisturizing lotion to skin surrounding the wound prior to dressing change.  [] Apply antifungal ointment to skin surrounding the wound prior to dressing change.  [] Apply thin film of moisture barrier ointment to skin immediately around wound. [] Other:       Dressings:           Wound Location LLE   [x] Apply Primary Dressing:       [] MediHoney Gel    [] MediHoney Alginate               [] Calcium Alginate      [] Calcium Alginate with Silver   [] Collagen                   [x] Collagen with Silver 1st layer                [] Santyl with Moistened saline gauze              [x] Hydrofera Blue (cut to size and moistened) 2nd layer  [] Hydrofera Blue Ready (Cut to size)   [] NS wet to dry    [] Betadine wet to dry              [] Hydrogel                [] Mepitel     [] Bactroban/Mupirocin               [] Other:     [x] Cover and Secure with:     [x] Gauze [] Caryle Nip [] Kerlix   [] Foam [x] Super Absorbant [] ABD     [] ACE Wrap            [] Other:    Avoid contact of tape with skin.     [x] Change dressing: [] Daily    [] Every Other Day [] Once per week   [x] Three times per week [] Do Not Change Dressing   [] Other    Compression:  Apply: [x] Multilayer Compression Wrap: []RightLeg [x]Left Leg                                 []Profore   []Profore Lite             [x]Unna     [x] Do not get leg(s) with wrap wet. If wraps become too tight call the center or completely remove the wrap. [x] Elevate leg(s) above the level of the heart when sitting. [x] Avoid prolonged standing in one place. Dietary:  [x] Diet as tolerated: [] Calorie Diabetic Diet: [x] No Added Salt:  [] Increase Protein: [] Other:     Activity:  [x] Activity as tolerated:    [] Patient has no activity restrictions       [] Strict Bedrest:   [] Remain off Work:       [] May return to full duty work:                                     [] Return to work with restrictions:                Electronically signed Lisset Wilcox RN on 10/5/2020 at 2:18 PM     [address]: Should you experience any significant changes in your wound(s) or have questions about your wound care, please contact Thais Doyle [address] at [place] [id number] 8:00 am - 4:30. If you need help with your wound outside of these hours and cannot wait until we are again available, contact your PCP or go to the hospital emergency room. PLEASE NOTE: IF YOU ARE UNABLE TO OBTAIN WOUND SUPPLIES, CONTINUE TO USE THE SUPPLIES YOU HAVE AVAILABLE UNTIL YOU ARE ABLE TO REACH US. IT IS MOST IMPORTANT TO KEEP THE WOUND COVERED AT ALL TIMES.     [x] Dr. Rolanda Menendez   [] Dr. Nickolas Altamirano  [] Margaux Archer [facility]  [] Dr. Rubin Goodman

## 2020-10-06 RX ORDER — LIDOCAINE HYDROCHLORIDE 20 MG/ML
15 SOLUTION OROPHARYNGEAL AS NEEDED
Status: DISCONTINUED | OUTPATIENT
Start: 2020-10-06 | End: 2020-10-07 | Stop reason: HOSPADM

## 2020-10-12 ENCOUNTER — HOSPITAL ENCOUNTER (OUTPATIENT)
Dept: WOUND CARE | Age: 65
Discharge: HOME | End: 2020-10-12
Payer: MEDICARE

## 2020-10-12 VITALS
TEMPERATURE: 98.5 F | HEART RATE: 113 BPM | DIASTOLIC BLOOD PRESSURE: 83 MMHG | RESPIRATION RATE: 20 BRPM | SYSTOLIC BLOOD PRESSURE: 131 MMHG

## 2020-10-12 PROCEDURE — 29580 STRAPPING UNNA BOOT: CPT

## 2020-10-12 RX ORDER — LIDOCAINE HYDROCHLORIDE 20 MG/ML
15 SOLUTION OROPHARYNGEAL AS NEEDED
Status: DISCONTINUED | OUTPATIENT
Start: 2020-10-12 | End: 2020-10-14 | Stop reason: HOSPADM

## 2020-10-12 NOTE — DISCHARGE INSTRUCTIONS
Discharge Instructions for  [address] [place] [address], [address] [facility]  Telephone: [phone]    NAME:  Ella Peters OF BIRTH:  1955  MEDICAL RECORD NUMBER:  [id number]  DATE:  10/12/2020      Return Appointment:  [] Dressing supply provider:   [x] Home Healthcare: [address] [facility]  [x] Return Appointment: 1 Week(s)  [] Nurse Visit: Vero Sandra  [] Discharge from [facility]    [] Ordered tests:   [] Referrals:     Wound Cleansing:   Do not scrub or use excessive force. Cleanse wound prior to applying a clean dressing with:  [] Normal Saline   [x] Mild Soap & Water    [] Keep Wound Dry in Shower  [] Other:      Topical Treatments:  Do not apply lotions, creams, or ointments to wound bed unless directed. [x] Apply moisturizing lotion to skin surrounding the wound prior to dressing change.  [] Apply antifungal ointment to skin surrounding the wound prior to dressing change.  [] Apply thin film of moisture barrier ointment to skin immediately around wound. [] Other:       Dressings:           Wound Location LLE   [x] Apply Primary Dressing:       [] MediHoney Gel    [] MediHoney Alginate               [] Calcium Alginate      [] Calcium Alginate with Silver   [] Collagen                   [x] Collagen with Silver                [] Santyl with Moistened saline gauze              [x] Hydrofera Blue (cut to size and moistened)  [] Hydrofera Blue Ready (Cut to size)   [] NS wet to dry    [] Betadine wet to dry              [] Hydrogel                [] Mepitel     [] Bactroban/Mupirocin               [] Other:      [x] Cover and Secure with:     [x] Gauze [] Marrianne Pih [] Kerlix   [] Foam [] Super Absorbant [] ABD     [] ACE Wrap            [] Other:    Avoid contact of tape with skin.     [x] Change dressing: [] Daily    [] Every Other Day [] Once per week   [x] Three times per week [] Do Not Change Dressing   [] Other:       Compression:  Apply: [x] Multilayer Compression Wrap: []RightLeg [x]Left Leg                                 []Profore   []Profore Lite             [x]Unna     [x] Do not get leg(s) with wrap wet. If wraps become too tight call the center or completely remove the wrap. [x] Elevate leg(s) above the level of the heart when sitting. [x] Avoid prolonged standing in one place. Dietary:  [x] Diet as tolerated: [] Calorie Diabetic Diet: [] No Added Salt:  [] Increase Protein: [] Other:     Activity:  [x] Activity as tolerated:    [] Patient has no activity restrictions       [] Strict Bedrest:   [] Remain off Work:       [] May return to full duty work:                                     [] Return to work with restrictions:                Electronically signed Jose Rodney RN on 10/12/2020 at 2:33 PM     [address]: Should you experience any significant changes in your wound(s) or have questions about your wound care, please contact Thais Doyle [address] at [place] [phone] 8:00 am - 4:30. If you need help with your wound outside of these hours and cannot wait until we are again available, contact your PCP or go to the hospital emergency room. PLEASE NOTE: IF YOU ARE UNABLE TO OBTAIN WOUND SUPPLIES, CONTINUE TO USE THE SUPPLIES YOU HAVE AVAILABLE UNTIL YOU ARE ABLE TO REACH US. IT IS MOST IMPORTANT TO KEEP THE WOUND COVERED AT ALL TIMES.     [x] Dr. Олег Carlos   [] Dr. Lam Waller  [] Lucas Rey [facility]  [] Dr. Claudette Patterson

## 2020-10-12 NOTE — WOUND CARE
[address] Progress Note and Procedure Note NO Procedure Giorgio Frazier MEDICAL RECORD NUMBER:  [id number] AGE: 71 y.o. RACE WHITE [place]   GENDER: male  : 1955 EPISODE DATE:  10/12/2020 Subjective: No new problems reported Chief Complaint Patient presents with  Wound Check L lat lower leg HISTORY of PRESENT ILLNESS HPI Giorgio Frazier is a 71 y.o. male who presents today for wound/ulcer evaluation. History of Wound Context: L leg wound Wound/Ulcer Pain Timing/Severity: mild Quality of pain: dull Severity:  1 / 10 Modifying Factors: Pain is relieved/improved with rest 
Associated Signs/Symptoms: edema Ulcer Identification: 
Ulcer Type: venous Contributing Factors: venous stasis Wound: L leg PAST MEDICAL HISTORY Past Medical History:  
Diagnosis Date  Arthritis  Chronic obstructive pulmonary disease ([facility] [address]  Diabetes ([facility] [address]  Gout  Hypertension  Sleep apnea PAST SURGICAL HISTORY Past Surgical History:  
Procedure Laterality Date  HX ORTHOPAEDIC    
 
 
FAMILY HISTORY Family History Problem Relation Age of Onset  Heart Disease Mother  Kidney Disease Mother  Hypertension Mother  Diabetes Mother  Heart Disease Father  Hypertension Father  Diabetes Sister  Diabetes Brother SOCIAL HISTORY Social History Tobacco Use  Smoking status: Never Smoker  Smokeless tobacco: Never Used Substance Use Topics  Alcohol use: Yes  Drug use: Never ALLERGIES Allergies Allergen Reactions  Elavil Unknown (comments)  Sulfa (Sulfonamide Antibiotics) Nausea and Vomiting MEDICATIONS Current Outpatient Medications on File Prior to Encounter Medication Sig Dispense Refill  aspirin 81 mg chewable tablet Take 81 mg by mouth daily.  flunisolide (NASAREL) 25 mcg (0.025 %) spry 2 Sprays two (2) times a day.  albuterol (ProAir HFA) 90 mcg/actuation inhaler Take  by inhalation.  colchicine 0.6 mg tablet Take 0.6 mg by mouth daily.  gabapentin (NEURONTIN) 300 mg capsule Take 300 mg by mouth four (4) times daily.  oxyCODONE IR (ROXICODONE) 5 mg immediate release tablet Take 5 mg by mouth every twelve (12) hours.  furosemide (Lasix) 40 mg tablet Take 40 mg by mouth daily.  amLODIPine (NORVASC) 5 mg tablet Take 5 mg by mouth daily.  metFORMIN (GLUCOPHAGE) 500 mg tablet Take 1,000 mg by mouth two (2) times daily (with meals).  hydroCHLOROthiazide (HYDRODIURIL) 25 mg tablet Take 25 mg by mouth daily.  insulin lispro (HumaLOG U-100 Insulin) 100 unit/mL injection by SubCUTAneous route. Sliding scale  insulin glargine (LANTUS) 100 unit/mL injection by SubCUTAneous route nightly. 80units am/ 20units pm    
 
No current facility-administered medications on file prior to encounter. REVIEW OF SYSTEMS Pertinent items are noted in HPI. Objective:  
 
Visit Vitals /83 (BP 1 Location: Right arm, BP Patient Position: Sitting) Pulse (!) 113 Temp 98.5 °F (36.9 °C) Resp 20 Wt Readings from Last 3 Encounters:  
No data found for [employer] PHYSICAL EXAM 
Wound measures slightly smaller Pertinent items are noted in HPI. Assessment:  
 some improvemnt Problem List Items Addressed This Visit None Procedure Note Indications:  Based on my examination of this patient's wound(s)/ulcer(s) today, debridement is not required to promote healing and evaluate the wound base. Wound Leg lower Left;Lateral #1 (Active) Dressing Status Removed 10/12/20 1420 Dressing Type 4 x 4;ABD pad; Wet to dry 09/21/20 1409 Non-staged Wound Description Full thickness 10/05/20 1413 Rubalcava Grading Scale 0-5  Grade 2 10/12/20 1420 Wound Length (cm) 5 cm 10/12/20 1420 Wound Width (cm) 4.7 cm 10/12/20 1420 Wound Depth (cm) 0.1 cm 10/12/20 1420 Wound Surface Area (cm^2) 23.5 cm^2 10/12/20 1420 Wound Volume (cm^3) 2.35 cm^3 10/12/20 1420 Change in Wound Size % 8.27 10/12/20 1420 Condition of Base Granulation;Slough 10/12/20 1420 Condition of Edges Open 10/12/20 1420 Assessment Clean, dry, and intact 10/05/20 1413 Tissue Type Percent Pink 5 10/05/20 1413 Tissue Type Percent Red 95 10/05/20 1413 Drainage Amount Large 10/12/20 1420 Drainage Color Serosanguinous 10/12/20 1420 Wound Odor None 10/12/20 1420 Alissa-wound Assessment Hyperpigmented; Maceration 10/12/20 1420 Margins Attached edges 10/12/20 1420 Cleansing and Cleansing Agents  Soap and water 10/12/20 1420 Dressing Changed Changed/New 10/12/20 1438 Number of days: 28  
  
 
Plan:  
Nancy + [facility] + Martina Zendejas Treatment Note please see attached Discharge Instructions Written patient dismissal instructions given to patient or POA.       
 
Electronically signed by Shannan Cox MD on 10/12/2020 at 2:46 PM

## 2020-10-12 NOTE — WOUND CARE
[employer] Below Knee NAME:  Daniel Lange YOB: 1955 MEDICAL RECORD NUMBER:  [id number] DATE:  10/12/2020 Remove old Unna Boot or Boots. Appied primary and secondary dressing as ordered. Applied Unna roll from toes to knee overlapping each time. Applied ace wrap or coban from toes to below the knee. Secured with tape and/or metal clips covered with tape. Instructed patient/caregiver to keep dressing dry and intact. DO NOT REMOVE DRESSING. Instructed pt/family/caregiver to report excessive draining, loose bandage, wet dressing, severe pain or tingling in toes. Applied [place] dressing below the knee to left lower leg. Unna Boot(s) were applied per  Guidelines.  
 
 Electronically signed by Clifton Quinones RN on 10/12/2020 at 2:36 PM

## 2020-10-19 ENCOUNTER — HOSPITAL ENCOUNTER (OUTPATIENT)
Dept: WOUND CARE | Age: 65
Discharge: HOME | End: 2020-10-19
Payer: MEDICARE

## 2020-10-19 VITALS
HEART RATE: 100 BPM | SYSTOLIC BLOOD PRESSURE: 127 MMHG | RESPIRATION RATE: 20 BRPM | DIASTOLIC BLOOD PRESSURE: 72 MMHG | TEMPERATURE: 98.4 F

## 2020-10-19 DIAGNOSIS — R60.0 LOCALIZED EDEMA: ICD-10-CM

## 2020-10-19 DIAGNOSIS — L97.929 IDIOPATHIC CHRONIC VENOUS HYPERTENSION OF LEFT LOWER EXTREMITY WITH ULCER (HCC): ICD-10-CM

## 2020-10-19 DIAGNOSIS — I87.312 IDIOPATHIC CHRONIC VENOUS HYPERTENSION OF LEFT LOWER EXTREMITY WITH ULCER (HCC): ICD-10-CM

## 2020-10-19 PROCEDURE — 29580 STRAPPING UNNA BOOT: CPT

## 2020-10-19 NOTE — WOUND CARE
[address] Progress Note and Procedure Note NO Procedure Lynette Da Silva MEDICAL RECORD NUMBER:  [id number] AGE: 71 y.o. RACE WHITE OR   GENDER: male  : 1955 EPISODE DATE:  10/19/2020 Subjective:  
Less drainage, no pain Chief Complaint Patient presents with  Wound Check  
  left leg HISTORY of PRESENT ILLNESS HPI Lynette Da Silva is a 71 y.o. male who presents today for wound/ulcer evaluation. History of Wound Context: L leg Wound/Ulcer Pain Timing/Severity: mild Quality of pain: dull Severity:  1 / 10 Modifying Factors: Pain is relieved/improved with rest 
Associated Signs/Symptoms: edema Ulcer Identification: 
Ulcer Type: venous Contributing Factors: edema Wound: L leg PAST MEDICAL HISTORY Past Medical History:  
Diagnosis Date  Arthritis  Chronic obstructive pulmonary disease ([facility] [address]  Diabetes ([facility] [address] [facility]  Gout  Hypertension  Sleep apnea PAST SURGICAL HISTORY Past Surgical History:  
Procedure Laterality Date  HX ORTHOPAEDIC    
 
 
FAMILY HISTORY Family History Problem Relation Age of Onset  Heart Disease Mother  Kidney Disease Mother  Hypertension Mother  Diabetes Mother  Heart Disease Father  Hypertension Father  Diabetes Sister  Diabetes Brother SOCIAL HISTORY Social History Tobacco Use  Smoking status: Never Smoker  Smokeless tobacco: Never Used Substance Use Topics  Alcohol use: Yes  Drug use: Never ALLERGIES Allergies Allergen Reactions  Elavil Unknown (comments)  Sulfa (Sulfonamide Antibiotics) Nausea and Vomiting MEDICATIONS Current Outpatient Medications on File Prior to Encounter Medication Sig Dispense Refill  aspirin 81 mg chewable tablet Take 81 mg by mouth daily.  flunisolide (NASAREL) 25 mcg (0.025 %) spry 2 Sprays two (2) times a day.  albuterol (ProAir HFA) 90 mcg/actuation inhaler Take  by inhalation.  colchicine 0.6 mg tablet Take 0.6 mg by mouth daily.  gabapentin (NEURONTIN) 300 mg capsule Take 300 mg by mouth four (4) times daily.  oxyCODONE IR (ROXICODONE) 5 mg immediate release tablet Take 5 mg by mouth every twelve (12) hours.  furosemide (Lasix) 40 mg tablet Take 40 mg by mouth daily.  amLODIPine (NORVASC) 5 mg tablet Take 5 mg by mouth daily.  metFORMIN (GLUCOPHAGE) 500 mg tablet Take 1,000 mg by mouth two (2) times daily (with meals).  hydroCHLOROthiazide (HYDRODIURIL) 25 mg tablet Take 25 mg by mouth daily.  insulin lispro (HumaLOG U-100 Insulin) 100 unit/mL injection by SubCUTAneous route. Sliding scale  insulin glargine (LANTUS) 100 unit/mL injection by SubCUTAneous route nightly. 80units am/ 20units pm    
 
No current facility-administered medications on file prior to encounter. REVIEW OF SYSTEMS Pertinent items are noted in HPI. Objective:  
 
Visit Vitals /72 (BP Patient Position: At rest;Sitting) Pulse 100 Temp 98.4 °F (36.9 °C) Resp 20 Wt Readings from Last 3 Encounters:  
No data found for [employer] PHYSICAL EXAM 
 
Pertinent items are noted in HPI. Wound measures smaller, healthy granulation tissue 
no  infection Assessment:  
 improving Problem List Items Addressed This Visit Idiopathic chronic venous hypertension of left lower extremity with ulcer ([address] Localized edema Procedure Note Indications:  Based on my examination of this patient's wound(s)/ulcer(s) today, debridement is not required to promote healing and evaluate the wound base. Wound Leg lower Left;Lateral #1 (Active) Dressing Status Clean, dry, and intact 10/19/20 1412 Dressing Type 4 x 4;ABD pad; Wet to dry 09/21/20 1409 Non-staged Wound Description Full thickness 10/19/20 1412 Rubalcava Grading Scale 0-5  Grade 2 10/12/20 1420 Wound Length (cm) 4 cm 10/19/20 1412 Wound Width (cm) 2.9 cm 10/19/20 1412 Wound Depth (cm) 0.2 cm 10/19/20 1412 Wound Surface Area (cm^2) 11.6 cm^2 10/19/20 1412 Wound Volume (cm^3) 2.32 cm^3 10/19/20 1412 Change in Wound Size % 54.72 10/19/20 1412 Condition of Base Granulation;Slough 10/19/20 1412 Condition of Edges Open 10/19/20 1412 Assessment Clean, dry, and intact 10/05/20 1413 Tissue Type Percent Pink 5 10/05/20 1413 Tissue Type Percent Red 95 10/05/20 1413 Drainage Amount Moderate 10/19/20 1412 Drainage Color Serosanguinous 10/19/20 1412 Wound Odor None 10/19/20 1412 Alissa-wound Assessment Clean 10/19/20 1412 Margins Defined edges 10/19/20 1412 Cleansing and Cleansing Agents  Soap and water 10/19/20 1412 Dressing Changed Changed/New 10/12/20 1438 Number of days: 35  
  
 
Plan:  
continue Nancy + HydroFera Blue, unna Treatment Note please see attached Discharge Instructions Written patient dismissal instructions given to patient or POA.       
 
Electronically signed by Jessica Ross MD on 10/19/2020 at 2:41 PM

## 2020-10-19 NOTE — WOUND CARE
[employer] Below Knee NAME:  Perla Rodrigues YOB: 1955 MEDICAL RECORD NUMBER:  [id number] DATE:  10/19/2020 Remove old Unna Boot or Boots. Appied primary and secondary dressing as ordered. Applied Unna roll from toes to knee overlapping each time. Applied ace wrap or coban from toes to below the knee. Secured with tape and/or metal clips covered with tape. Instructed patient/caregiver to keep dressing dry and intact. DO NOT REMOVE DRESSING. Instructed pt/family/caregiver to report excessive draining, loose bandage, wet dressing, severe pain or tingling in toes. Applied [place] dressing below the knee to left lower leg. Unna Boot(s) were applied per  Guidelines.  
 
 Electronically signed by Jayesh Parra RN on 10/19/2020 at 3:04 PM

## 2020-10-19 NOTE — DISCHARGE INSTRUCTIONS
Discharge Instructions for  [address] [facility], [facility]  Telephone: [phone]    NAME:  Demarcus Hess OF BIRTH:  1955  MEDICAL RECORD NUMBER:  [id number]  DATE:  10/19/2020      Return Appointment:  [] Dressing supply provider:   [x] Home Healthcare: [address] [facility]  [x] Return Appointment: 1 Week(s)  [] Nurse Visit: Leandro Reese  [] Discharge from [facility]    [] Ordered tests:   [] Referrals:     Wound Cleansing:   Do not scrub or use excessive force. Cleanse wound prior to applying a clean dressing with:  [] Normal Saline   [x] Mild Soap & Water    [] Keep Wound Dry in Shower  [] Other:      Topical Treatments:  Do not apply lotions, creams, or ointments to wound bed unless directed. [x] Apply moisturizing lotion to skin surrounding the wound prior to dressing change.  [] Apply antifungal ointment to skin surrounding the wound prior to dressing change.  [] Apply thin film of moisture barrier ointment to skin immediately around wound. [] Other:       Dressings:           Wound Location Left LE   [x] Apply Primary Dressing:       [x] Nancy                   [x] Hydrofera Blue cut to size and moistened    [x] Cover and Secure with:     [x] Gauze [] Cristian [] Kerlix   [] Foam [] Super Absorbant [] ABD     [] ACE Wrap            [] Other:    Avoid contact of tape with skin. [x] Change dressing: [] Daily    [] Every Other Day [] Once per week   [x] Three times per week [] Do Not Change Dressing   [] Other:       Compression:  Apply: [] Multilayer Compression Wrap: []RightLeg [x]Left Leg                                 []Profore  []Profore Lite             [x]Unna     [x] Do not get leg(s) with wrap wet. If wraps become too tight call the center or completely remove the wrap. [x] Elevate leg(s) above the level of the heart when sitting. [x] Avoid prolonged standing in one place.       Dietary:  [x] Diet as tolerated: [] Calorie Diabetic Diet: [x] No Added Salt:  [] Increase Protein: [] Other:     Activity:  [x] Activity as tolerated:    [] Patient has no activity restrictions       [] Strict Bedrest:   [] Remain off Work:       [] May return to full duty work:                                     [] Return to work with restrictions:                Electronically signed Sabine Tirado RN on 10/19/2020 at 2:27 PM     K[address]: Should you experience any significant changes in your wound(s) or have questions about your wound care, please contact Thais Doyle [address] at [place] [phone] 8:00 am - 4:30. If you need help with your wound outside of these hours and cannot wait until we are again available, contact your PCP or go to the hospital emergency room. PLEASE NOTE: IF YOU ARE UNABLE TO OBTAIN WOUND SUPPLIES, CONTINUE TO USE THE SUPPLIES YOU HAVE AVAILABLE UNTIL YOU ARE ABLE TO REACH US. IT IS MOST IMPORTANT TO KEEP THE WOUND COVERED AT ALL TIMES.     [x] Dr. Sandra Correa   [] Dr. Ambrosio Davies  [] Mariza Wisdom [facility]  [] Dr. Moriah Piedra

## 2020-10-26 ENCOUNTER — HOSPITAL ENCOUNTER (OUTPATIENT)
Dept: WOUND CARE | Age: 65
Discharge: HOME | End: 2020-10-26
Payer: MEDICARE

## 2020-10-26 VITALS
OXYGEN SATURATION: 97 % | HEART RATE: 90 BPM | DIASTOLIC BLOOD PRESSURE: 65 MMHG | RESPIRATION RATE: 20 BRPM | SYSTOLIC BLOOD PRESSURE: 125 MMHG | TEMPERATURE: 98.6 F

## 2020-10-26 DIAGNOSIS — R60.0 LOCALIZED EDEMA: ICD-10-CM

## 2020-10-26 DIAGNOSIS — I87.312 IDIOPATHIC CHRONIC VENOUS HYPERTENSION OF LEFT LOWER EXTREMITY WITH ULCER (HCC): ICD-10-CM

## 2020-10-26 DIAGNOSIS — L97.929 IDIOPATHIC CHRONIC VENOUS HYPERTENSION OF LEFT LOWER EXTREMITY WITH ULCER (HCC): ICD-10-CM

## 2020-10-26 PROCEDURE — 11042 DBRDMT SUBQ TIS 1ST 20SQCM/<: CPT

## 2020-10-26 RX ORDER — LIDOCAINE HYDROCHLORIDE 20 MG/ML
15 SOLUTION OROPHARYNGEAL AS NEEDED
Status: DISCONTINUED | OUTPATIENT
Start: 2020-10-26 | End: 2020-10-28 | Stop reason: HOSPADM

## 2020-10-26 NOTE — WOUND CARE
[employer] Below Knee NAME:  Radha Manning YOB: 1955 MEDICAL RECORD NUMBER:  [id number] DATE:  10/26/2020 Remove old Unna Boot or Boots. Unna Boot(s) were applied per  Guidelines.  
 
 Electronically signed by Verónica Garnica LPN on 06/98/8860 at 11:02 AM

## 2020-10-26 NOTE — WOUND CARE
[address] Progress Note and Procedure Note Shea Trevizo MEDICAL RECORD NUMBER:  [id number] AGE: 71 y.o. RACE WHITE OR   GENDER: male  : 1955 EPISODE DATE:  10/26/2020 Subjective: Chief Complaint Patient presents with  Wound Check  
  left leg HISTORY of PRESENT ILLNESS HPI Shea Trevizo is a 71 y.o. male who presents today for wound/ulcer evaluation. History of Wound Context: L leg Wound/Ulcer Pain Timing/Severity: mild Quality of pain: dull Severity:  1 / 10 Modifying Factors: Pain is relieved/improved with rest 
Associated Signs/Symptoms: edema Ulcer Identification: 
Ulcer Type: venous Contributing Factors: venous stasis Wound: LLE  
     
PAST MEDICAL HISTORY Past Medical History:  
Diagnosis Date  Arthritis  Chronic obstructive pulmonary disease ([facility] [address]  Diabetes ([facility] [address]  Gout  Hypertension  Sleep apnea PAST SURGICAL HISTORY Past Surgical History:  
Procedure Laterality Date  HX ORTHOPAEDIC    
 
 
FAMILY HISTORY Family History Problem Relation Age of Onset  Heart Disease Mother  Kidney Disease Mother  Hypertension Mother  Diabetes Mother  Heart Disease Father  Hypertension Father  Diabetes Sister  Diabetes Brother SOCIAL HISTORY Social History Tobacco Use  Smoking status: Never Smoker  Smokeless tobacco: Never Used Substance Use Topics  Alcohol use: Yes  Drug use: Never ALLERGIES Allergies Allergen Reactions  Elavil Unknown (comments)  Sulfa (Sulfonamide Antibiotics) Nausea and Vomiting MEDICATIONS Current Outpatient Medications on File Prior to Encounter Medication Sig Dispense Refill  aspirin 81 mg chewable tablet Take 81 mg by mouth daily.  flunisolide (NASAREL) 25 mcg (0.025 %) spry 2 Sprays two (2) times a day.  albuterol (ProAir HFA) 90 mcg/actuation inhaler Take  by inhalation.  colchicine 0.6 mg tablet Take 0.6 mg by mouth daily.  gabapentin (NEURONTIN) 300 mg capsule Take 300 mg by mouth four (4) times daily.  oxyCODONE IR (ROXICODONE) 5 mg immediate release tablet Take 5 mg by mouth every twelve (12) hours.  furosemide (Lasix) 40 mg tablet Take 40 mg by mouth daily.  amLODIPine (NORVASC) 5 mg tablet Take 5 mg by mouth daily.  metFORMIN (GLUCOPHAGE) 500 mg tablet Take 1,000 mg by mouth two (2) times daily (with meals).  hydroCHLOROthiazide (HYDRODIURIL) 25 mg tablet Take 25 mg by mouth daily.  insulin lispro (HumaLOG U-100 Insulin) 100 unit/mL injection by SubCUTAneous route. Sliding scale  insulin glargine (LANTUS) 100 unit/mL injection by SubCUTAneous route nightly. 80units am/ 20units pm    
 
No current facility-administered medications on file prior to encounter. REVIEW OF SYSTEMS Pertinent items are noted in HPI. Objective:  
 
Visit Vitals /65 (BP Patient Position: At rest;Sitting) Pulse 90 Temp 98.6 °F (37 °C) Resp 20 SpO2 97% Wt Readings from Last 3 Encounters:  
No data found for [employer] PHYSICAL EXAM 
New epithelium growing from perihery into wound No infection Pertinent items are noted in HPI. Assessment:  
 wound measures larger Problem List Items Addressed This Visit Idiopathic chronic venous hypertension of left lower extremity with ulcer ([address] Localized edema POP IN PROCEDURE TYPE (DEBRIDEMENT, BIOPSY, I&D, SKIN SUB, CHEMICAL CAUERTY) Plan:  
Venous duplex with Dr Taylor Kiser Continue shefali + Hydrofera blue Treatment Note please see attached Discharge Instructions Written patient dismissal instructions given to patient or POA. Electronically signed by Jaclyn Mooney MD on 10/26/2020 at 10:47 AM 
 
 
 
 
 
 
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 Performed by: Elizabeth Norton MD 
 
Consent obtained: Yes Time out taken: Yes Debridement: Excisional 
 
Using curette the wound(s)/ulcer(s) was/were sharply debrided down through and including the removal of   
subcutaneous tissue Devitalized Tissue Debrided: slough Pre Debridement Measurements: 
Are located in the [place]  Documentation Flow Sheet Wound/Ulcer #: 1 Post Debridement Measurements: 
Wound/Ulcer Descriptions are Pre Debridement except measurements:Non-Pressure ulcer, fat layer exposed Wound Leg lower Left;Lateral #1 (Active) Dressing Status Clean, dry, and intact 10/26/20 1002 Dressing Type Unna boot 10/26/20 1002 Non-staged Wound Description Full thickness 10/26/20 1002 Rubalcava Grading Scale 0-5  Grade 2 10/12/20 1420 Wound Length (cm) 5 cm 10/26/20 1002 Wound Width (cm) 3.5 cm 10/26/20 1002 Wound Depth (cm) 0.2 cm 10/26/20 1002 Wound Surface Area (cm^2) 17.5 cm^2 10/26/20 1002 Wound Volume (cm^3) 3.5 cm^3 10/26/20 1002 Change in Wound Size % 31.69 10/26/20 1002 Condition of Base Granulation;Slough 10/26/20 1002 Condition of Edges Rolled/curled 10/26/20 1002 Assessment Clean, dry, and intact 10/05/20 1413 Tissue Type Percent Pink 5 10/05/20 1413 Tissue Type Percent Red 95 10/05/20 1413 Drainage Amount Moderate 10/26/20 1002 Drainage Color Serosanguinous 10/26/20 1002 Wound Odor None 10/26/20 1002 Alissa-wound Assessment Clean 10/26/20 1002 Margins Defined edges 10/26/20 1002 Cleansing and Cleansing Agents  Soap and water 10/26/20 1002 Dressing Changed Changed/New 10/26/20 1046 Post-Procedure Length (cm) 5 cm 10/26/20 1040 Post-Procedure Width (cm) 3.5 cm 10/26/20 1040 Post-Procedure Depth (cm) 0.2 cm 10/26/20 1040 Post-Procedure Volume (cm^3) 3.5 cm^3 10/26/20 1040 Post-Procedure Surface Area (cm^2) 17.5 cm^2 10/26/20 1040 Number of days: 42 Percent of Wound(s)/Ulcer(s) Debrided: 80% Total Surface Area Debrided:  15 sq cm Diabetic/Pressure/Non Pressure Ulcers only: 
Ulcer:  
 
Estimated Blood Loss:  Minimal  
 
Hemostasis Achieved: Pressure Procedural Pain: 1 / 10 Post Procedural Pain: 1 / 10 Response to treatment: Well tolerated by patient

## 2020-10-26 NOTE — DISCHARGE INSTRUCTIONS
Discharge Instructions for  [address] [place] [address], [address] [place] [address]  Telephone: [phone]    NAME:  Juan Ramon Campbell OF BIRTH:  1955  MEDICAL RECORD NUMBER:  [id number]  DATE:  10/26/2020      Return Appointment:  [] Dressing supply provider:   [x] [facility] [address]  [x] Return Appointment: 1 Week(s)  [] Nurse Visit: Franky Edgar  [] Discharge from [facility]    [] Ordered tests:   [] Referrals:     Wound Cleansing:   Do not scrub or use excessive force. Cleanse wound prior to applying a clean dressing with:  [] Normal Saline   [x] Mild Soap & Water    [] Keep Wound Dry in Shower  [] Other:      Topical Treatments:  Do not apply lotions, creams, or ointments to wound bed unless directed. [x] Apply moisturizing lotion to skin surrounding the wound prior to dressing change.  [] Apply antifungal ointment to skin surrounding the wound prior to dressing change.  [] Apply thin film of moisture barrier ointment to skin immediately around wound. [] Other:       Dressings:           Wound Location Left Leg  [x] Apply Primary Dressing:       [] MediHoney Gel    [] MediHoney Alginate               [] Calcium Alginate      [] Calcium Alginate with Silver   [] Collagen                   [x] Collagen with Silver                [] Santyl with Moistened saline gauze              [x] Hydrofera Blue (cut to size and moistened)  [] Hydrofera Blue Ready (Cut to size)   [] NS wet to dry    [] Betadine wet to dry              [] Hydrogel                [] Mepitel     [] Bactroban/Mupirocin               [] Other:      [x] Cover and Secure with:     [x] Gauze [] William Douglas [] Kerlix   [] Foam [] Super Absorbant [x] ABD     [] ACE Wrap            [] Other:    Avoid contact of tape with skin.     [x] Change dressing: [] Daily    [] Every Other Day [] Once per week   [x] Three times per week [] Do Not Change Dressing   [] Other:        Compression:  Apply: [x] Multilayer Compression Wrap: []RightLeg [x]Left Leg                                 []Profore   []Profore Lite             [x]Unna     [x] Do not get leg(s) with wrap wet. If wraps become too tight call the center or completely remove the wrap. [x] Elevate leg(s) above the level of the heart when sitting. [x] Avoid prolonged standing in one place. Dietary:  [x] Diet as tolerated: [] Calorie Diabetic Diet: [x] No Added Salt:  [] Increase Protein: [] Other:     Activity:  [x] Activity as tolerated:    [] Patient has no activity restrictions       [] Strict Bedrest:   [] Remain off Work:       [] May return to full duty work:                                     [] Return to work with restrictions:                Electronically signed Fernandez Aguilar RN on 10/26/2020 at 10:39 AM     Kiah Mathews 281: Should you experience any significant changes in your wound(s) or have questions about your wound care, please contact Thais Doyle [address] at [place] [phone] 8:00 am - 4:30. If you need help with your wound outside of these hours and cannot wait until we are again available, contact your PCP or go to the hospital emergency room. PLEASE NOTE: IF YOU ARE UNABLE TO OBTAIN WOUND SUPPLIES, CONTINUE TO USE THE SUPPLIES YOU HAVE AVAILABLE UNTIL YOU ARE ABLE TO REACH US. IT IS MOST IMPORTANT TO KEEP THE WOUND COVERED AT ALL TIMES.     [x] Dr. Kika Parnell   [] Dr. Terra Pathak  [] Anisa Dom[facility]nguez [facility]  [] Dr. Td Vital

## 2020-10-26 NOTE — WOUND CARE
[employer] Below Knee NAME:  Alyssa Hernandes YOB: 1955 MEDICAL RECORD NUMBER:  [id number] DATE:  10/26/2020 Remove old Unna Boot or Boots. Appied primary and secondary dressing as ordered. Applied Unna roll from toes to knee overlapping each time. Applied ace wrap or coban from toes to below the knee. Secured with tape and/or metal clips covered with tape. Instructed patient/caregiver to keep dressing dry and intact. DO NOT REMOVE DRESSING. Instructed pt/family/caregiver to report excessive draining, loose bandage, wet dressing, severe pain or tingling in toes. Applied [place] dressing below the knee to left lower leg. Unna Boot(s) were applied per  Guidelines.  
 
 Electronically signed by Larry Aponte LPN on 42/20/1846 at 11:04 AM

## 2020-11-02 ENCOUNTER — HOSPITAL ENCOUNTER (OUTPATIENT)
Dept: WOUND CARE | Age: 65
Discharge: HOME | End: 2020-11-02
Payer: MEDICARE

## 2020-11-02 VITALS
DIASTOLIC BLOOD PRESSURE: 58 MMHG | SYSTOLIC BLOOD PRESSURE: 132 MMHG | RESPIRATION RATE: 20 BRPM | OXYGEN SATURATION: 98 % | HEART RATE: 95 BPM

## 2020-11-02 DIAGNOSIS — L97.929 IDIOPATHIC CHRONIC VENOUS HYPERTENSION OF LEFT LOWER EXTREMITY WITH ULCER (HCC): ICD-10-CM

## 2020-11-02 DIAGNOSIS — R60.0 LOCALIZED EDEMA: ICD-10-CM

## 2020-11-02 DIAGNOSIS — I87.312 IDIOPATHIC CHRONIC VENOUS HYPERTENSION OF LEFT LOWER EXTREMITY WITH ULCER (HCC): ICD-10-CM

## 2020-11-02 PROCEDURE — 74011000250 HC RX REV CODE- 250: Performed by: SPECIALIST

## 2020-11-02 PROCEDURE — 29580 STRAPPING UNNA BOOT: CPT

## 2020-11-02 RX ORDER — LIDOCAINE HYDROCHLORIDE 20 MG/ML
15 SOLUTION OROPHARYNGEAL AS NEEDED
Status: DISCONTINUED | OUTPATIENT
Start: 2020-11-02 | End: 2020-11-04 | Stop reason: HOSPADM

## 2020-11-02 NOTE — WOUND CARE
[address] Progress Note and Procedure Note NO Procedure Alyssa Hernandes MEDICAL RECORD NUMBER:  [id number] AGE: 71 y.o. RACE WHITE OR   GENDER: male  : 1955 EPISODE DATE:  2020 Subjective:  
Dr Max Kocher performed a venous ablation procedure today, to be performed again tomorrow No new problems identified Chief Complaint Patient presents with  Wound Check LT lower leg HISTORY of PRESENT ILLNESS HPI Alyssa Hernandes is a 71 y.o. male who presents today for wound/ulcer evaluation. History of Wound Context: LLE wound Wound/Ulcer Pain Timing/Severity: none Quality of pain: dull Severity:  0 / 10 Modifying Factors: Pain is relieved/improved with rest 
Associated Signs/Symptoms: edema Ulcer Identification: 
Ulcer Type: venous Contributing Factors: edema Wound: LLE  
     
PAST MEDICAL HISTORY Past Medical History:  
Diagnosis Date  Arthritis  Chronic obstructive pulmonary disease ([facility]  Diabetes ([facility]  Gout  Hypertension  Sleep apnea PAST SURGICAL HISTORY Past Surgical History:  
Procedure Laterality Date  HX ORTHOPAEDIC    
 HX VEIN ABLATION ADHESIVE    
 
 
FAMILY HISTORY Family History Problem Relation Age of Onset  Heart Disease Mother  Kidney Disease Mother  Hypertension Mother  Diabetes Mother  Heart Disease Father  Hypertension Father  Diabetes Sister  Diabetes Brother SOCIAL HISTORY Social History Tobacco Use  Smoking status: Never Smoker  Smokeless tobacco: Never Used Substance Use Topics  Alcohol use: Yes  Drug use: Never ALLERGIES Allergies Allergen Reactions  Elavil Unknown (comments)  Sulfa (Sulfonamide Antibiotics) Nausea and Vomiting MEDICATIONS Current Outpatient Medications on File Prior to Encounter Medication Sig Dispense Refill  aspirin 81 mg chewable tablet Take 81 mg by mouth daily.  flunisolide (NASAREL) 25 mcg (0.025 %) spry 2 Sprays two (2) times a day.  albuterol (ProAir HFA) 90 mcg/actuation inhaler Take  by inhalation.  colchicine 0.6 mg tablet Take 0.6 mg by mouth daily.  gabapentin (NEURONTIN) 300 mg capsule Take 300 mg by mouth four (4) times daily.  oxyCODONE IR (ROXICODONE) 5 mg immediate release tablet Take 5 mg by mouth every twelve (12) hours.  furosemide (Lasix) 40 mg tablet Take 40 mg by mouth daily.  amLODIPine (NORVASC) 5 mg tablet Take 5 mg by mouth daily.  metFORMIN (GLUCOPHAGE) 500 mg tablet Take 1,000 mg by mouth two (2) times daily (with meals).  hydroCHLOROthiazide (HYDRODIURIL) 25 mg tablet Take 25 mg by mouth daily.  insulin lispro (HumaLOG U-100 Insulin) 100 unit/mL injection by SubCUTAneous route. Sliding scale  insulin glargine (LANTUS) 100 unit/mL injection by SubCUTAneous route nightly. 80units am/ 20units pm    
 
No current facility-administered medications on file prior to encounter. REVIEW OF SYSTEMS Pertinent items are noted in HPI. Objective:  
 
Visit Vitals BP (!) 132/58 (BP 1 Location: Right arm, BP Patient Position: Sitting) Pulse 95 Resp 20 SpO2 98% Wt Readings from Last 3 Encounters:  
No data found for [employer] Ulcer measures smaller, granulating, no infection PHYSICAL EXAM 
 
Pertinent items are noted in HPI. Assessment:  
 slowly improving Problem List Items Addressed This Visit Idiopathic chronic venous hypertension of left lower extremity with ulcer ([address] Localized edema Procedure Note Indications:  Based on my examination of this patient's wound(s)/ulcer(s) today, debridement is not required to promote healing and evaluate the wound base. Wound Leg lower Left;Lateral #1 (Active) Dressing Status Clean, dry, and intact 10/26/20 1002 Dressing Type Anayaa boot 10/26/20 1002 Non-staged Wound Description Full thickness 10/26/20 1002 Rubalcava Grading Scale 0-5  Grade 2 10/12/20 1420 Wound Length (cm) 4.4 cm 11/02/20 1337 Wound Width (cm) 3.1 cm 11/02/20 1337 Wound Depth (cm) 0.2 cm 11/02/20 1337 Wound Surface Area (cm^2) 13.64 cm^2 11/02/20 1337 Wound Volume (cm^3) 2.73 cm^3 11/02/20 1337 Change in Wound Size % 46.76 11/02/20 1337 Condition of Base Granulation;Slough 11/02/20 1337 Condition of Edges Rolled/curled 11/02/20 1337 Assessment Clean, dry, and intact; Pink 11/02/20 1337 Tissue Type Percent Pink 5 10/05/20 1413 Tissue Type Percent Red 95 10/05/20 1413 Drainage Amount Large 11/02/20 1337 Drainage Color Serosanguinous 11/02/20 1337 Wound Odor None 11/02/20 1337 Alissa-wound Assessment Clean 11/02/20 1337 Margins Defined edges 11/02/20 1337 Cleansing and Cleansing Agents  Soap and water 11/02/20 1337 Dressing Changed Changed/New 10/26/20 1112 Post-Procedure Length (cm) 5 cm 10/26/20 1040 Post-Procedure Width (cm) 3.5 cm 10/26/20 1040 Post-Procedure Depth (cm) 0.2 cm 10/26/20 1040 Post-Procedure Volume (cm^3) 3.5 cm^3 10/26/20 1040 Post-Procedure Surface Area (cm^2) 17.5 cm^2 10/26/20 1040 Number of days: 49  
  
 
Plan:  
Nancy + Hydrofera Blue + Kelli Sieving Procedure by Dr Emeli Murdock Treatment Note please see attached Discharge Instructions Written patient dismissal instructions given to patient or POA.       
 
Electronically signed by Luis Diamond MD on 11/2/2020 at 2:04 PM

## 2020-11-02 NOTE — WOUND CARE
[employer] Below Knee NAME:  Mitali Meza YOB: 1955 MEDICAL RECORD NUMBER:  [id number] DATE:  11/2/2020 Remove old Unna Boot or Boots. Applied moisturizing agent to dry skin as needed. Appied primary and secondary dressing as ordered. Applied Unna roll from toes to knee overlapping each time. Applied ace wrap or coban from toes to below the knee. Secured with tape and/or metal clips covered with tape. Instructed patient/caregiver to keep dressing dry and intact. DO NOT REMOVE DRESSING. Instructed pt/family/caregiver to report excessive draining, loose bandage, wet dressing, severe pain or tingling in toes. Applied [employer] dressing below the knee to left lower leg. Unna Boot(s) were applied per  Guidelines.  
 
 Electronically signed by Scott Browning RN on 11/2/2020 at 2:16 PM

## 2020-11-02 NOTE — DISCHARGE INSTRUCTIONS
Discharge Instructions for  [address] [place] [address], [address] [place] [address]  Telephone: [phone]    NAME:  Og Christensen OF BIRTH:  1955  MEDICAL RECORD NUMBER:  [id number]  DATE:  11/2/2020      Return Appointment:  [] Dressing supply provider:   [x] Home Healthcare: [address]  [x] Return Appointment:  1 Week(s)  [] Nurse Visit: Isaias Teresa)  [] [facility]charge from [facility]    [] Ordered tests:    [] Referrals:     Wound Cleansing:   Do not scrub or use excessive force. Cleanse wound prior to applying a clean dressing with:  [] Normal Saline   [x] Mild Soap & Water    [] Keep Wound Dry in Shower  [] Other:      Topical Treatments:  Do not apply lotions, creams, or ointments to wound bed unless directed. [] Apply moisturizing lotion to skin surrounding the wound prior to dressing change.  [] Apply antifungal ointment to skin surrounding the wound prior to dressing change.  [] Apply thin film of moisture barrier ointment to skin immediately around wound. [] Other:       Dressings:           Wound Location ***   [] Apply Primary Dressing:       [] MediHoney Gel    [] MediHoney Alginate               [] Calcium Alginate      [] Calcium Alginate with Silver   [] Collagen                   [] Collagen with Silver                [] Santyl with Moistened saline gauze              [] Hydrofera Blue (cut to size and moistened)  [] Hydrofera Blue Ready (Cut to size)   [] NS wet to dry    [] Betadine wet to dry              [] Hydrogel                [] Mepitel     [] Bactroban/Mupirocin               [x] Other:  Nancy, hydrofera blue, gauze, ABD and a unna(change 3 times a week)    [] Pack wound loosely with  [] Iodoform   [] Plain Packing       [] Other:     [] Cover and Secure with:     [] Gauze [] Cristian [] Kerlix   [] Foam [] Super Absorbant [] ABD     [] ACE Wrap            [] Other:    Avoid contact of tape with skin.     [] Change dressing: [] Daily    [] Every Other Day [] Once per week   [] Three times per week [] Do Not Change Dressing   [] Other:       Negative Pressure:           Wound Location:   [] Pressure @           mm/Hg  []Continuous []Intermittent   [] Black  [] White Foam              [] Bridge:        Pressure Relief:  [] When sitting, shift position or do seat lifts every 15 minutes.  [] Wheelchair cushion [] Specialty Bed/Mattress  [] Turn every 2 hours when in bed. Avoid position directing pressure on wound site. Limit side lying to 30 degree tilt. Limit HOB elevation to 30 degrees. Edema Control:  Apply: [] Compression Stocking []Right Leg []Left Leg   [] Tubigrip []Right Leg Double Layer []Left Leg Double Layer      []Right Leg Single Layer []Left Leg Single Layer     [] Elevate leg(s) above the level of the heart when sitting. [] Avoid prolonged standing in one place. Compression:  Apply: [x] Multilayer Compression Wrap: []RightLeg [x]Left Leg                                 []Profore  []Profore Lite             [x]Unna     [x] Do not get leg(s) with wrap wet. If wraps become too tight call the center or completely remove the wrap. [x] Elevate leg(s) above the level of the heart when sitting. [x] Avoid prolonged standing in one place. Off-Loading:   [] Off-loading when [] walking  [] in bed [] sitting  [] Total non-weight bearing  [] Right Leg  [] Left Leg   [] Assistive Device [] Walker [] Cane      [] Wheelchair  [] Crutches   [] Surgical shoe    [] Podus Boot(s)   [] Foam Boot(s)  [] Roll About    [] Cast Boot [] CROW Boot  [] Other:    Contact Cast:  Apply: [] Total Contact Cast Applied in Clinic []RightLeg []Left Leg   [] Do not get cast wet. Contact center or go to emergency room if there is a foul odor or becomes uncomfortable due to feeling tight or swelling. Do not use objects inside of cast to scratch.       Dietary:  [] Diet as tolerated: [] Calorie Diabetic Diet: [] No Added Salt:  [] Increase Protein: [] Other:     Activity:  [] Activity as tolerated:    [] Patient has no activity restrictions       [] Strict Bedrest:   [] Remain off Work:       [] May return to full duty work:                                     [] Return to work with restrictions:                Electronically signed Yumiko Santiago RN on 11/2/2020 at 2:04 PM     [address] Information: Should you experience any significant changes in your wound(s) or have questions about your wound care, please contact Thais Caldera at [place] [phone] 8:00 am - 4:30. If you need help with your wound outside of these hours and cannot wait until we are again available, contact your PCP or go to the hospital emergency room. PLEASE NOTE: IF YOU ARE UNABLE TO OBTAIN WOUND SUPPLIES, CONTINUE TO USE THE SUPPLIES YOU HAVE AVAILABLE UNTIL YOU ARE ABLE TO REACH US. IT IS MOST IMPORTANT TO KEEP THE WOUND COVERED AT ALL TIMES.     [x] Dr. Kika Parnell   [] Dr. Terra Pathak  [] Anisa Domínguez [facility]  [] Dr. Td Vital

## 2020-11-09 ENCOUNTER — HOSPITAL ENCOUNTER (OUTPATIENT)
Dept: WOUND CARE | Age: 65
Discharge: HOME | End: 2020-11-09
Payer: MEDICARE

## 2020-11-09 VITALS
HEART RATE: 94 BPM | TEMPERATURE: 98.1 F | DIASTOLIC BLOOD PRESSURE: 76 MMHG | SYSTOLIC BLOOD PRESSURE: 121 MMHG | OXYGEN SATURATION: 99 % | RESPIRATION RATE: 20 BRPM

## 2020-11-09 DIAGNOSIS — I87.312 IDIOPATHIC CHRONIC VENOUS HYPERTENSION OF LEFT LOWER EXTREMITY WITH ULCER (HCC): ICD-10-CM

## 2020-11-09 DIAGNOSIS — R60.0 LOCALIZED EDEMA: ICD-10-CM

## 2020-11-09 DIAGNOSIS — L97.929 IDIOPATHIC CHRONIC VENOUS HYPERTENSION OF LEFT LOWER EXTREMITY WITH ULCER (HCC): ICD-10-CM

## 2020-11-09 PROCEDURE — 11042 DBRDMT SUBQ TIS 1ST 20SQCM/<: CPT

## 2020-11-09 RX ORDER — LIDOCAINE HYDROCHLORIDE 20 MG/ML
15 SOLUTION OROPHARYNGEAL AS NEEDED
Status: DISCONTINUED | OUTPATIENT
Start: 2020-11-09 | End: 2020-11-11 | Stop reason: HOSPADM

## 2020-11-09 NOTE — WOUND CARE
[address] Progress Note and Procedure Note Abel Espinal MEDICAL RECORD NUMBER:  [id number] AGE: 76 y.o. RACE WHITE OR   GENDER: male  : 1955 EPISODE DATE:  2020 Subjective:  
Had another procedure by Dr. Grover Vera today No new complaints Chief Complaint Patient presents with  Wound Check  
  left lateral lower leg HISTORY of PRESENT ILLNESS HPI Abel Espinal is a 76 y.o. male who presents today for wound/ulcer evaluation. History of Wound Context: LLE VSU Wound/Ulcer Pain Timing/Severity: mild Quality of pain: dull Severity:  1 / 10 Modifying Factors: Pain is relieved/improved with rest 
Associated Signs/Symptoms: edema Ulcer Identification: 
Ulcer Type: venous Contributing Factors: diabetes Wound: LLE  
     
PAST MEDICAL HISTORY Past Medical History:  
Diagnosis Date  Arthritis  Chronic obstructive pulmonary disease ([facility]  Diabetes ([facility]  Gout  Hypertension  Sleep apnea PAST SURGICAL HISTORY Past Surgical History:  
Procedure Laterality Date  HX ORTHOPAEDIC    
 HX VEIN ABLATION ADHESIVE    
 
 
FAMILY HISTORY Family History Problem Relation Age of Onset  Heart Disease Mother  Kidney Disease Mother  Hypertension Mother  Diabetes Mother  Heart Disease Father  Hypertension Father  Diabetes Sister  Diabetes Brother SOCIAL HISTORY Social History Tobacco Use  Smoking status: Never Smoker  Smokeless tobacco: Never Used Substance Use Topics  Alcohol use: Yes  Drug use: Never ALLERGIES Allergies Allergen Reactions  Elavil Unknown (comments)  Sulfa (Sulfonamide Antibiotics) Nausea and Vomiting MEDICATIONS Current Outpatient Medications on File Prior to Encounter Medication Sig Dispense Refill  aspirin 81 mg chewable tablet Take 81 mg by mouth daily.  flunisolide (NASAREL) 25 mcg (0.025 %) spry 2 Sprays two (2) times a day.  albuterol (ProAir HFA) 90 mcg/actuation inhaler Take  by inhalation.  colchicine 0.6 mg tablet Take 0.6 mg by mouth daily.  gabapentin (NEURONTIN) 300 mg capsule Take 300 mg by mouth four (4) times daily.  oxyCODONE IR (ROXICODONE) 5 mg immediate release tablet Take 5 mg by mouth every twelve (12) hours.  furosemide (Lasix) 40 mg tablet Take 40 mg by mouth daily.  amLODIPine (NORVASC) 5 mg tablet Take 5 mg by mouth daily.  metFORMIN (GLUCOPHAGE) 500 mg tablet Take 1,000 mg by mouth two (2) times daily (with meals).  hydroCHLOROthiazide (HYDRODIURIL) 25 mg tablet Take 25 mg by mouth daily.  insulin lispro (HumaLOG U-100 Insulin) 100 unit/mL injection by SubCUTAneous route. Sliding scale  insulin glargine (LANTUS) 100 unit/mL injection by SubCUTAneous route nightly. 80units am/ 20units pm    
 
No current facility-administered medications on file prior to encounter. REVIEW OF SYSTEMS Pertinent items are noted in HPI. Objective:  
 
Visit Vitals /76 (BP 1 Location: Right arm, BP Patient Position: At rest;Sitting) Pulse 94 Temp 98.1 °F (36.7 °C) Resp 20 SpO2 99% Wt Readings from Last 3 Encounters:  
No data found for [employer] PHYSICAL EXAM 
Wound measures smaller, no infection Pertinent items are noted in HPI. Assessment:  
 wound improving, smaller Problem List Items Addressed This Visit Idiopathic chronic venous hypertension of left lower extremity with ulcer ([address] Localized edema POP IN PROCEDURE TYPE (DEBRIDEMENT, BIOPSY, I&D, SKIN SUB, CHEMICAL CAUERTY) Plan:  
Stop Nancy, continue hydrofera blue, Unna boot Further procedures by Dr. James Comes Treatment Note please see attached Discharge Instructions Written patient dismissal instructions given to patient or POA. Electronically signed by Eugenie Tam MD on 11/9/2020 at 2:40 PM 
 
 
 
 
 
 
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 Performed by: Eugenie Tam MD 
 
Consent obtained: Yes Time out taken: Yes Debridement: Excisional 
 
Using curette the wound(s)/ulcer(s) was/were sharply debrided down through and including the removal of   
subcutaneous tissue Devitalized Tissue Debrided: slough Pre Debridement Measurements: 
Are located in the [place]  Documentation Flow Sheet Wound/Ulcer #: 1 Post Debridement Measurements: 
Wound/Ulcer Descriptions are Pre Debridement except measurements:Non-Pressure ulcer, fat layer exposed Wound Leg lower Left;Lateral #1 (Active) Wound Etiology Venous 11/09/20 1415 Dressing Status Clean;Dry; Intact 11/09/20 1415 Cleansed Soap and water 11/09/20 1415 Dressing/Treatment Thermon Search boot 10/26/20 1002 Wound Length (cm) 4 cm 11/09/20 1415 Wound Width (cm) 2.6 cm 11/09/20 1415 Wound Depth (cm) 0.1 cm 11/09/20 1415 Wound Surface Area (cm^2) 10.4 cm^2 11/09/20 1415 Change in Wound Size % 59.41 11/09/20 1415 Wound Volume (cm^3) 1.04 cm^3 11/09/20 1415 Wound Healing % 80 11/09/20 1415 Post-Procedure Length (cm) 4 cm 11/09/20 1431 Post-Procedure Width (cm) 2.6 cm 11/09/20 1431 Post-Procedure Depth (cm) 0.1 cm 11/09/20 1431 Post-Procedure Surface Area (cm^2) 10.4 cm^2 11/09/20 1431 Post-Procedure Volume (cm^3) 1.04 cm^3 11/09/20 1431 Wound Assessment Granulation tissue;Slough 11/09/20 1415 Drainage Amount Moderate 11/09/20 1415 Drainage Description Serosanguinous 11/09/20 1415 Wound Odor None 11/09/20 1415 Alissa-Wound/Incision Assessment Hyperpigmented 11/09/20 1415 Wound Thickness Description Full thickness 10/26/20 1002 Read-Only, Retired. Condition of Base Granulation;Slough 11/02/20 1337 Read-Only, Retired. Condition of Edges Rolled/curled 11/02/20 1337 Read-Only, Retired. Tissue Type Percent Pink 5 10/05/20 1413 Read-Only, Retired. Tissue Type Percent Red 95 10/05/20 1413 Read-Only, Retired. Cleansing and Cleansing Agents Soap and water 11/02/20 1337 Number of days: 64 Percent of Wound(s)/Ulcer(s) Debrided: 70% Total Surface Area Debrided:  7 sq cm Diabetic/Pressure/Non Pressure Ulcers only: 
Ulcer:  
 
Estimated Blood Loss:  Minimal  
 
Hemostasis Achieved: Pressure Procedural Pain: 1 / 10 Post Procedural Pain: 0 / 10 Response to treatment: Well tolerated by patient

## 2020-11-09 NOTE — WOUND CARE
[employer] Below Knee NAME:  Aliza Ojeda YOB: 1955 MEDICAL RECORD NUMBER:  [id number] DATE:  11/9/2020 Remove old Unna Boot or Boots. Appied primary and secondary dressing as ordered. Applied Unna roll from toes to knee overlapping each time. Applied ace wrap or coban from toes to below the knee. Secured with tape and/or metal clips covered with tape. Instructed patient/caregiver to keep dressing dry and intact. DO NOT REMOVE DRESSING. Instructed pt/family/caregiver to report excessive draining, loose bandage, wet dressing, severe pain or tingling in toes. Applied [employer] dressing below the knee to left lower leg. Unna Boot(s) were applied per  Guidelines. Response to treatment: Well tolerated by patient  Electronically signed by Kvng Charles RN on 11/9/2020 at 3:14 PM

## 2020-11-09 NOTE — DISCHARGE INSTRUCTIONS
Discharge Instructions for  [facility], [address] [place] [address]  Telephone: [phone]    NAME:  Edy Peterson OF BIRTH:  1955  MEDICAL RECORD NUMBER:  [id number]  DATE:  11/9/2020      Return Appointment:  [] Dressing supply provider:   [x] Home Healthcare: [address] [facility]  [x] Return Appointment: 1 Week(s)  [] Nurse Visit: Kiersten Willis)  [] Discharge from [facility]    [] Ordered tests:   [] Referrals:     Wound Cleansing:   Do not scrub or use excessive force. Cleanse wound prior to applying a clean dressing with:  [] Normal Saline   [x] Mild Soap & Water    [] Keep Wound Dry in Shower  [] Other:      Topical Treatments:  Do not apply lotions, creams, or ointments to wound bed unless directed. [x] Apply moisturizing lotion to skin surrounding the wound prior to dressing change.  [] Apply antifungal ointment to skin surrounding the wound prior to dressing change.  [] Apply thin film of moisture barrier ointment to skin immediately around wound. [] Other:       Dressings:           Wound Location Left leg   [x] Apply Primary Dressing:       [] MediHoney Gel    [] MediHoney Alginate               [] Calcium Alginate      [] Calcium Alginate with Silver   [] Collagen                   [] Collagen with Silver                [] Santyl with Moistened saline gauze              [x] Hydrofera Blue (cut to size and moistened)  [] Hydrofera Blue Ready (Cut to size)   [] NS wet to dry    [] Betadine wet to dry              [] Hydrogel                [] Mepitel     [] Bactroban/Mupirocin               [] Other:      [x] Cover and Secure with:     [x] Gauze [] Rilla Frees [] Kerlix   [] Foam [] Super Absorbant [x] ABD     [] ACE Wrap            [] Other:    Avoid contact of tape with skin.     [x] Change dressing: [] Daily    [] Every Other Day [] Once per week   [x] Three times per week [] Do Not Change Dressing   [] Other:       Compression:  Apply: [x] Multilayer Compression Wrap: []RightLeg [x]Left Leg                                 []Profore  []Profore Lite             [x]Unna     [x] Do not get leg(s) with wrap wet. If wraps become too tight call the center or completely remove the wrap. [x] Elevate leg(s) above the level of the heart when sitting. [x] Avoid prolonged standing in one place. Dietary:  [x] Diet as tolerated: [x] Calorie Diabetic Diet: [x] No Added Salt:  [] Increase Protein: [] Other:     Activity:  [x] Activity as tolerated:    [] Patient has no activity restrictions       [] Strict Bedrest:   [] Remain off Work:       [] May return to full duty work:                                     [] Return to work with restrictions:                Electronically signed Carlos Tompkins RN on 11/9/2020 at 2:27 PM     [address]: Should you experience any significant changes in your wound(s) or have questions about your wound care, please contact Thais Doyle 26 at [place] [phone] 8:00 am - 4:30. If you need help with your wound outside of these hours and cannot wait until we are again available, contact your PCP or go to the hospital emergency room. PLEASE NOTE: IF YOU ARE UNABLE TO OBTAIN WOUND SUPPLIES, CONTINUE TO USE THE SUPPLIES YOU HAVE AVAILABLE UNTIL YOU ARE ABLE TO REACH US. IT IS MOST IMPORTANT TO KEEP THE WOUND COVERED AT ALL TIMES.     [x] Dr. Sin Chirinos   [] Dr. Jorge Lujan  [] [facility]  [] Dr. Drake Puente

## 2020-11-16 ENCOUNTER — HOSPITAL ENCOUNTER (OUTPATIENT)
Dept: WOUND CARE | Age: 65
Discharge: HOME | End: 2020-11-16
Payer: MEDICARE

## 2020-11-16 VITALS
RESPIRATION RATE: 18 BRPM | DIASTOLIC BLOOD PRESSURE: 78 MMHG | TEMPERATURE: 98.9 F | HEART RATE: 91 BPM | SYSTOLIC BLOOD PRESSURE: 125 MMHG

## 2020-11-16 DIAGNOSIS — L97.929 IDIOPATHIC CHRONIC VENOUS HYPERTENSION OF LEFT LOWER EXTREMITY WITH ULCER (HCC): ICD-10-CM

## 2020-11-16 DIAGNOSIS — I87.312 IDIOPATHIC CHRONIC VENOUS HYPERTENSION OF LEFT LOWER EXTREMITY WITH ULCER (HCC): ICD-10-CM

## 2020-11-16 DIAGNOSIS — R60.0 LOCALIZED EDEMA: ICD-10-CM

## 2020-11-16 PROCEDURE — 29580 STRAPPING UNNA BOOT: CPT

## 2020-11-16 PROCEDURE — 74011000250 HC RX REV CODE- 250: Performed by: SPECIALIST

## 2020-11-16 RX ORDER — LIDOCAINE HYDROCHLORIDE 20 MG/ML
15 SOLUTION OROPHARYNGEAL AS NEEDED
Status: DISCONTINUED | OUTPATIENT
Start: 2020-11-16 | End: 2020-11-18 | Stop reason: HOSPADM

## 2020-11-16 NOTE — WOUND CARE
[employer] Below Knee NAME:  Giorgio Frazier YOB: 1955 MEDICAL RECORD NUMBER:  [id number] DATE:  11/16/2020 Remove old Unna Boot or Boots. Appied primary and secondary dressing as ordered. Applied Unna roll from toes to knee overlapping each time. Applied ace wrap or coban from toes to below the knee. Secured with tape and/or metal clips covered with tape. Instructed patient/caregiver to keep dressing dry and intact. DO NOT REMOVE DRESSING. Instructed pt/family/caregiver to report excessive draining, loose bandage, wet dressing, severe pain or tingling in toes. Applied [employer] dressing below the knee to left lower leg. Unna Boot(s) were applied per  Guidelines. Response to treatment: Well tolerated by patient  Electronically signed by Leyla Grant RN on 11/16/2020 at 2:40 PM

## 2020-11-16 NOTE — WOUND CARE
[address] Progress Note and Procedure Note NO Procedure Evelio Randall MEDICAL RECORD NUMBER:  [id number] AGE: 76 y.o. RACE WHITE OR   GENDER: male  : 1955 EPISODE DATE:  2020 Subjective:  
Underwent 8th procedure with Dr. Rocio Borges No new problems reported Chief Complaint Patient presents with  Wound Check  
  left lower leg HISTORY of PRESENT ILLNESS HPI Evelio Randall is a 76 y.o. male who presents today for wound/ulcer evaluation. History of Wound Context: LLE VSU Wound/Ulcer Pain Timing/Severity: intermittent and mild Quality of pain: throbbing and dull Severity:  2 / 10 Modifying Factors: Pain is relieved/improved with rest 
Associated Signs/Symptoms: edema Ulcer Identification: 
Ulcer Type: venous Contributing Factors: edema Wound: LLE  
     
PAST MEDICAL HISTORY Past Medical History:  
Diagnosis Date  Arthritis  Chronic obstructive pulmonary disease ([facility]  Diabetes ([facility]  Gout  Hypertension  Sleep apnea PAST SURGICAL HISTORY Past Surgical History:  
Procedure Laterality Date  HX ORTHOPAEDIC    
 HX VEIN ABLATION ADHESIVE    
 
 
FAMILY HISTORY Family History Problem Relation Age of Onset  Heart Disease Mother  Kidney Disease Mother  Hypertension Mother  Diabetes Mother  Heart Disease Father  Hypertension Father  Diabetes Sister  Diabetes Brother SOCIAL HISTORY Social History Tobacco Use  Smoking status: Never Smoker  Smokeless tobacco: Never Used Substance Use Topics  Alcohol use: Yes  Drug use: Never ALLERGIES Allergies Allergen Reactions  Elavil Unknown (comments)  Sulfa (Sulfonamide Antibiotics) Nausea and Vomiting MEDICATIONS Current Outpatient Medications on File Prior to Encounter Medication Sig Dispense Refill  aspirin 81 mg chewable tablet Take 81 mg by mouth daily.  flunisolide (NASAREL) 25 mcg (0.025 %) spry 2 Sprays two (2) times a day.  albuterol (ProAir HFA) 90 mcg/actuation inhaler Take  by inhalation.  colchicine 0.6 mg tablet Take 0.6 mg by mouth daily.  gabapentin (NEURONTIN) 300 mg capsule Take 300 mg by mouth four (4) times daily.  oxyCODONE IR (ROXICODONE) 5 mg immediate release tablet Take 5 mg by mouth every twelve (12) hours.  furosemide (Lasix) 40 mg tablet Take 40 mg by mouth daily.  amLODIPine (NORVASC) 5 mg tablet Take 5 mg by mouth daily.  metFORMIN (GLUCOPHAGE) 500 mg tablet Take 1,000 mg by mouth two (2) times daily (with meals).  hydroCHLOROthiazide (HYDRODIURIL) 25 mg tablet Take 25 mg by mouth daily.  insulin lispro (HumaLOG U-100 Insulin) 100 unit/mL injection by SubCUTAneous route. Sliding scale  insulin glargine (LANTUS) 100 unit/mL injection by SubCUTAneous route nightly. 80units am/ 20units pm    
 
No current facility-administered medications on file prior to encounter. REVIEW OF SYSTEMS Pertinent items are noted in HPI. Objective:  
 
Visit Vitals /78 (BP Patient Position: At rest;Sitting) Pulse 91 Temp 98.9 °F (37.2 °C) Resp 18 Wt Readings from Last 3 Encounters:  
No data found for [employer] PHYSICAL EXAM 
The wound has changed in form, the posterior edge with neoepithelium, no infection Pertinent items are noted in HPI. Assessment:  
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 Wound Leg lower Left;Lateral #1 (Active) Wound Etiology Venous 11/16/20 1428 Dressing Status Clean;Dry; Intact 11/16/20 1428 Cleansed Soap and water 11/16/20 1428 Dressing/Treatment Merline Chalet boot 10/26/20 1002 Wound Length (cm) 4.2 cm 11/16/20 1428 Wound Width (cm) 3 cm 11/16/20 1428 Wound Depth (cm) 0.1 cm 11/16/20 1428 Wound Surface Area (cm^2) 12.6 cm^2 11/16/20 1428 Change in Wound Size % 50.82 11/16/20 1428 Wound Volume (cm^3) 1.26 cm^3 11/16/20 1428 Wound Healing % 75 11/16/20 1428 Post-Procedure Length (cm) 4 cm 11/09/20 1431 Post-Procedure Width (cm) 2.6 cm 11/09/20 1431 Post-Procedure Depth (cm) 0.1 cm 11/09/20 1431 Post-Procedure Surface Area (cm^2) 10.4 cm^2 11/09/20 1431 Post-Procedure Volume (cm^3) 1.04 cm^3 11/09/20 1431 Wound Assessment Granulation tissue;Slough 11/16/20 1428 Drainage Amount Moderate 11/16/20 1428 Drainage Description Serosanguinous 11/16/20 1428 Wound Odor None 11/16/20 1428 Alissa-Wound/Incision Assessment Intact 11/16/20 1428 Edges Defined edges 11/16/20 1428 Wound Thickness Description Full thickness 11/16/20 1428 Read-Only, Retired. Condition of Base Granulation;Slough 11/02/20 1337 Read-Only, Retired. Condition of Edges Rolled/curled 11/02/20 1337 Read-Only, Retired. Tissue Type Percent Pink 5 10/05/20 1413 Read-Only, Retired. Tissue Type Percent Red 95 10/05/20 1413 Read-Only, Retired. Cleansing and Cleansing Agents Soap and water 11/02/20 1337 Number of days: 63  
  
 
Plan:  
Continue Hydrofera Blue, Merline Chalet Treatment Note please see attached Discharge Instructions Written patient dismissal instructions given to patient or POA.       
 
Electronically signed by Shanna Lynn MD on 11/16/2020 at 2:51 PM

## 2020-11-23 ENCOUNTER — OFFICE VISIT (OUTPATIENT)
Dept: PODIATRY | Age: 65
End: 2020-11-23
Payer: MEDICARE

## 2020-11-23 VITALS
OXYGEN SATURATION: 99 % | DIASTOLIC BLOOD PRESSURE: 73 MMHG | TEMPERATURE: 97.8 F | WEIGHT: 315 LBS | HEART RATE: 103 BPM | SYSTOLIC BLOOD PRESSURE: 140 MMHG

## 2020-11-23 DIAGNOSIS — E11.42 TYPE 2 DIABETES MELLITUS WITH DIABETIC POLYNEUROPATHY, WITH LONG-TERM CURRENT USE OF INSULIN (HCC): Primary | ICD-10-CM

## 2020-11-23 DIAGNOSIS — B35.1 ONYCHOMYCOSIS: ICD-10-CM

## 2020-11-23 DIAGNOSIS — Z79.4 TYPE 2 DIABETES MELLITUS WITH DIABETIC POLYNEUROPATHY, WITH LONG-TERM CURRENT USE OF INSULIN (HCC): Primary | ICD-10-CM

## 2020-11-23 DIAGNOSIS — L85.9 HYPERKERATOSIS: ICD-10-CM

## 2020-11-23 PROCEDURE — 11057 PARNG/CUTG B9 HYPRKR LES >4: CPT | Performed by: PODIATRIST

## 2020-11-23 PROCEDURE — 11721 DEBRIDE NAIL 6 OR MORE: CPT | Performed by: PODIATRIST

## 2020-11-23 PROCEDURE — 11755 BIOPSY NAIL UNIT: CPT | Performed by: PODIATRIST

## 2020-11-23 PROCEDURE — 99203 OFFICE O/P NEW LOW 30 MIN: CPT | Performed by: PODIATRIST

## 2020-11-30 NOTE — PROGRESS NOTES
[facility]    Subjective:         Patient is a 76 y.o. male who is being seen as a new pt for multiple lower extremity complaints. Patient states he has a diabetic without follow-up with a podiatrist.  He needs an initial diabetic pedal exam.  He states his disease is under control but cannot recall his last hemoglobin A1c. He states he has lower extremity swelling, which has resulted in a wound to his left leg. He states he is under the care of the [facility] wound care center for this issue. He states his toenails are thick/discolored but denies ever having testing performed to confirm a diagnosis. He also states he has multiple calluses to both of his feet. He states these calluses cause pain rising the level of 5 out of 10. He states the pain is localized and nonradiating. He states the pain is sharp and exacerbated with weightbearing. He denies any overt systemic signs of infection. He denies any other pedal complaints    Past Medical History:   Diagnosis Date    Arthritis     Chronic obstructive pulmonary disease (HCC)     Diabetes ([address]     Gout     Hypertension     Sleep apnea      Past Surgical History:   Procedure Laterality Date    HX ORTHOPAEDIC      HX VEIN ABLATION ADHESIVE         Family History   Problem Relation Age of Onset    Heart Disease Mother     Kidney Disease Mother     Hypertension Mother     Diabetes Mother     Heart Disease Father     Hypertension Father     Diabetes Sister     Diabetes Brother       Social History     Tobacco Use    Smoking status: Never Smoker    Smokeless tobacco: Never Used   Substance Use Topics    Alcohol use: Yes     Allergies   Allergen Reactions    Elavil Unknown (comments)    Sulfa (Sulfonamide Antibiotics) Nausea and Vomiting     Prior to Admission medications    Medication Sig Start Date End Date Taking? Authorizing Provider   aspirin 81 mg chewable tablet Take 81 mg by mouth daily.    Yes Provider, Historical   flunisolide (NASAREL) 25 mcg (0.025 %) spry 2 Sprays two (2) times a day. Yes Provider, Historical   albuterol (ProAir HFA) 90 mcg/actuation inhaler Take  by inhalation. Yes Provider, Historical   colchicine 0.6 mg tablet Take 0.6 mg by mouth daily. Yes Provider, Historical   gabapentin (NEURONTIN) 300 mg capsule Take 300 mg by mouth four (4) times daily. Yes Provider, Historical   oxyCODONE IR (ROXICODONE) 5 mg immediate release tablet Take 5 mg by mouth every twelve (12) hours. Yes Provider, Historical   furosemide (Lasix) 40 mg tablet Take 40 mg by mouth daily. Yes Provider, Historical   amLODIPine (NORVASC) 5 mg tablet Take 5 mg by mouth daily. Yes Provider, Historical   metFORMIN (GLUCOPHAGE) 500 mg tablet Take 1,000 mg by mouth two (2) times daily (with meals). Yes Provider, Historical   hydroCHLOROthiazide (HYDRODIURIL) 25 mg tablet Take 25 mg by mouth daily. Yes Provider, Historical   insulin lispro (HumaLOG U-100 Insulin) 100 unit/mL injection by SubCUTAneous route. Sliding scale   Yes Provider, Historical   insulin glargine (LANTUS) 100 unit/mL injection by SubCUTAneous route nightly. 80units am/ 20units pm   Yes Provider, Historical       Review of Systems   Constitutional: Negative. HENT: Negative. Eyes: Negative. Respiratory: Negative. Cardiovascular: Positive for leg swelling. Endocrine: Negative. Genitourinary: Negative. Musculoskeletal: Positive for arthralgias. Allergic/Immunologic: Positive for immunocompromised state. Neurological: Positive for numbness. Hematological: Negative. Psychiatric/Behavioral: Negative. All other systems reviewed and are negative. Objective:     Visit Vitals  BP (!) 140/73 (BP 1 Location: Right arm, BP Patient Position: Sitting)   Pulse (!) 103   Temp 97.8 °F (36.6 °C) (Temporal)   Wt (!) 367 lb (166.5 kg)   SpO2 99%       Physical Exam  Vitals signs reviewed.    Constitutional:       Appearance: He is morbidly obese. Cardiovascular:      Pulses:           Dorsalis pedis pulses are 2+ on the right side and 2+ on the left side. Posterior tibial pulses are 2+ on the right side and 2+ on the left side. Pulmonary:      Effort: Pulmonary effort is normal.   Musculoskeletal:      Right lower leg: Edema present. Left lower leg: Edema present. Right foot: Decreased range of motion. No deformity, bunion or Charcot foot. Left foot: Decreased range of motion. No deformity, bunion or Charcot foot. Feet:      Right foot:      Protective Sensation: 10 sites tested. 0 sites sensed. Skin integrity: Callus present. Toenail Condition: Right toenails are abnormally thick. Fungal disease present. Left foot:      Protective Sensation: 10 sites tested. 0 sites sensed. Skin integrity: Callus present. Toenail Condition: Left toenails are abnormally thick. Fungal disease present. Lymphadenopathy:      Lower Body: No right inguinal adenopathy. No left inguinal adenopathy. Skin:     General: Skin is warm. Capillary Refill: Capillary refill takes 2 to 3 seconds. Neurological:      Mental Status: He is alert and oriented to person, place, and time. Psychiatric:         Mood and Affect: Mood and affect normal.         Behavior: Behavior is cooperative. Data Review: No results found for this or any previous visit (from the past 24 hour(s)). Impression:       ICD-10-CM ICD-9-CM    1. Type 2 diabetes mellitus with diabetic polyneuropathy, with long-term current use of insulin ([facility])  E11.42 250.60     Z79.4 357.2      V58.67    2. Hyperkeratosis  L85.9 701.1    3. Onychomycosis  B35.1 110.1 BIOPSY, NAIL UNIT         Recommendation:     Patient seen and evaluated in the office  Discussed and educated patient regarding their current medical condition. Instructed patient to monitor their feet daily, be compliant with all medications and wear supportive shoe gear.   A toenail plate debridement was performed to all 10 pedal digits without incident. This was performed with a nail nipper and patient tolerated well. Patient was needed  It was determined that a toenail plate biopsy will be taken of the right hallux with a nail nipper. Patient tolerated well and no dressing was needed. This was was sent to pathology to verify the presence of fungal elements. When pathology results return, will discuss results and treatment options  A sharp, excisional debridement was performed to a total of 7 hyperkeratotic lesions noted to the plantar aspect of his feet. This was performed down to the level of normal epidermis without incident. No dressing was needed.   Instructed patient that he would need to limit focal pressure and shear forces to prevent the lesions from returning        RTC in

## 2020-12-04 NOTE — DISCHARGE INSTRUCTIONS
Discharge Instructions for  [address] [place] [address], [address] [place] [address]  Telephone: [phone]    NAME:  Bebe Tamayo OF BIRTH:  1955  MEDICAL RECORD NUMBER:  [id number]  DATE:  11/16/2020      Return Appointment:  [] Dressing supply provider:   [x] Home Healthcare: [address] [facility]  [x] Return Appointment: 2 Week(s)  [] Nurse Visit: Arsalan Mcgarry  [] Discharge from [facility]    [] Ordered tests:   [] Referrals:     Wound Cleansing:   Do not scrub or use excessive force. Cleanse wound prior to applying a clean dressing with:  [] Normal Saline   [x] Mild Soap & Water    [] Keep Wound Dry in Shower  [] Other:      Topical Treatments:  Do not apply lotions, creams, or ointments to wound bed unless directed. [x] Apply moisturizing lotion to skin surrounding the wound prior to dressing change.  [] Apply antifungal ointment to skin surrounding the wound prior to dressing change.  [] Apply thin film of moisture barrier ointment to skin immediately around wound. [] Other:       Dressings:           Wound Location Left leg  [x] Apply Primary Dressing:       [] MediHoney Gel    [] MediHoney Alginate               [] Calcium Alginate      [] Calcium Alginate with Silver   [] Collagen                   [] Collagen with Silver                [] Santyl with Moistened saline gauze              [x] Hydrofera Blue (cut to size and moistened)  [] Hydrofera Blue Ready (Cut to size)   [] NS wet to dry    [] Betadine wet to dry              [] Hydrogel                [] Mepitel     [] Bactroban/Mupirocin               [] Other:      [x] Cover and Secure with:     [x] Gauze [] [place] [] Kerlix   [] Foam [] Super Absorbant [x] ABD     [] ACE Wrap            [] Other:    Avoid contact of tape with skin.     [x] Change dressing: [] Daily    [] Every Other Day [] Once per week   [x] Three times per week [] Do Not Change Dressing   [] Other:        Compression:  Apply: [x] Multilayer Compression Wrap: []RightLeg [x]Left Leg                                 []Profore   []Profore Lite             [x]Unna     [x] Do not get leg(s) with wrap wet. If wraps become too tight call the center or completely remove the wrap. [x] Elevate leg(s) above the level of the heart when sitting. [x] Avoid prolonged standing in one place. Dietary:  [x] Diet as tolerated: [] Calorie Diabetic Diet: [] No Added Salt:  [] Increase Protein: [] Other:     Activity:  [x] Activity as tolerated:    [] Patient has no activity restrictions       [] Strict Bedrest:   [] Remain off Work:       [] May return to full duty work:                                     [] Return to work with restrictions:                Electronically signed Lico Nicole RN on 11/16/2020 at 2:36 PM     [address]: Should you experience any significant changes in your wound(s) or have questions about your wound care, please contact Th[address] at [place] [address] 8:00 am - 4:30. If you need help with your wound outside of these hours and cannot wait until we are again available, contact your PCP or go to the hospital emergency room. PLEASE NOTE: IF YOU ARE UNABLE TO OBTAIN WOUND SUPPLIES, CONTINUE TO USE THE SUPPLIES YOU HAVE AVAILABLE UNTIL YOU ARE ABLE TO REACH US. IT IS MOST IMPORTANT TO KEEP THE WOUND COVERED AT ALL TIMES.     [x] Dr. Kishore Frey   [] Dr. Michell Fry  [] [facility]  [] Dr. Jodi Rosenthal

## 2020-12-09 DIAGNOSIS — B35.1 ONYCHOMYCOSIS: Primary | ICD-10-CM

## 2020-12-09 RX ORDER — CICLOPIROX 80 MG/ML
6.6 SOLUTION TOPICAL
Qty: 1 BOTTLE | Refills: 2 | Status: CANCELLED | OUTPATIENT
Start: 2020-12-09 | End: 2021-03-09

## 2021-01-04 ENCOUNTER — HOSPITAL ENCOUNTER (OUTPATIENT)
Dept: WOUND CARE | Age: 66
Discharge: HOME | End: 2021-01-04
Payer: MEDICARE

## 2021-01-04 VITALS
SYSTOLIC BLOOD PRESSURE: 135 MMHG | DIASTOLIC BLOOD PRESSURE: 74 MMHG | RESPIRATION RATE: 18 BRPM | HEART RATE: 106 BPM | TEMPERATURE: 98.4 F

## 2021-01-04 PROBLEM — U07.1 COVID-19: Status: ACTIVE | Noted: 2021-01-04

## 2021-01-04 PROCEDURE — 11042 DBRDMT SUBQ TIS 1ST 20SQCM/<: CPT

## 2021-01-04 RX ORDER — MENTHOL
1000 GEL (GRAM) TOPICAL 2 TIMES DAILY
COMMUNITY
End: 2022-03-02

## 2021-01-04 RX ORDER — BISMUTH SUBSALICYLATE 262 MG
1 TABLET,CHEWABLE ORAL DAILY
COMMUNITY
End: 2022-03-02

## 2021-01-04 RX ORDER — TITANIUM DIOXIDE/OXYBEN/CINOX
LOTION (ML) TOPICAL
COMMUNITY

## 2021-01-04 RX ORDER — LIDOCAINE HYDROCHLORIDE 20 MG/ML
15 SOLUTION OROPHARYNGEAL AS NEEDED
Status: DISCONTINUED | OUTPATIENT
Start: 2021-01-04 | End: 2021-01-06 | Stop reason: HOSPADM

## 2021-01-04 NOTE — WOUND CARE
Discharge Instructions for [address] Telephone: [phone] NAME:  Marina Vaughn YOB: 1955 MEDICAL RECORD NUMBER:  [id number] DATE:  1/4/2021 Return Appointment: 
[] Dressing supply provider:  
[x] [facility] Healthcare: [facility] [x] Return Appointment: 1 Week(s) 
[] Nurse Visit:  Week(s) 
 
[] Discharge from [facility] [] Ordered tests:  
[] Referrals:  
[] Rx:  
 
Wound Cleansing: Do not scrub or use excessive force. Cleanse wound prior to applying a clean dressing with: 
[x] Normal Saline  
[x] Mild Soap & Water   
[] Keep Wound Dry in Shower 
[] Other:   
 
Topical Treatments: Do not apply lotions, creams, or ointments to wound bed unless directed. [x] Apply moisturizing lotion to skin surrounding the wound prior to dressing change. 
[] Apply antifungal ointment to skin surrounding the wound prior to dressing change. 
[] Apply thin film of moisture barrier ointment to skin immediately around wound. [] Other:   
  
Dressings:           Wound Location Left leg [x] Apply Primary Dressing:     
 [] MediHoney Gel    [] MediHoney Alginate  
            [] Calcium Alginate      [] Calcium Alginate with Silver 
 [] Collagen                   [] Collagen with Silver   
            [] Santyl with Moistened saline gauze [x] Hydrofera Blue (cut to size and moistened)  [] Hydrofera Blue Ready (Cut to size) [] NS wet to dry    [] Betadine wet to dry 
            [] Hydrogel                [] Mepitel   
 [] Bactroban/Mupirocin  
            [] Other:  
 
[x] Cover and Secure with:   
 [x] Gauze [] Crist[place] [] Kerlix [] Foam [] Super Absorbant [x] ABD [] ACE Wrap            [] Other:  
 Avoid contact of tape with skin. [x] Change dressing: [] Daily    [] Every Other Day [] Once per week   [] Twice per week [] Three times per week [x] Do Not Change Dressing   [] Other: 
 
Compression: 
Apply: [x] Multilayer Compression Wrap: []RightLeg [x]Left Leg 
                               []Profore  []Profore Lite             [x]Unna [x] Do not get leg(s) with wrap wet. If wraps become too tight call the center or completely remove the wrap. [x] Elevate leg(s) above the level of the heart when sitting. [x] Avoid prolonged standing in one place. Dietary: 
[x] Diet as tolerated: [] Calorie Diabetic Diet: [x] No Added Salt: 
[] Increase Protein: [] Other:  
 
Activity: 
[x] Activity as tolerated:   
[] Patient has no activity restrictions [] Strict Bedrest:  
[] Remain off Work:      
[] May return to full duty work:                                    
[] Return to work with restrictions:  
         
 
 Electronically signed Malick Elena RN on 1/4/2021 at 2:19 PM  
 
[facility] Information: Should you experience any significant changes in your wound(s) or have questions about your wound care, please contact Thais Doyle [address] at [place] [address] 8:00 am - 4:30. If you need help with your wound outside of these hours and cannot wait until we are again available, contact your PCP or go to the hospital emergency room. PLEASE NOTE: IF YOU ARE UNABLE TO OBTAIN WOUND SUPPLIES, CONTINUE TO USE THE SUPPLIES YOU HAVE AVAILABLE UNTIL YOU ARE ABLE TO REACH US. IT IS MOST IMPORTANT TO KEEP THE WOUND COVERED AT ALL TIMES. [x] Dr. Arvind Alfredo  
[] Dr. Margareth Estrella 
[] [facility] [] Dr. Paris Eye

## 2021-01-04 NOTE — WOUND CARE
[employer] Below Knee NAME:  Ab Parisi YOB: 1955 MEDICAL RECORD NUMBER:  [id number] DATE:  1/4/2021 Remove old Unna Boot or Boots. Applied moisturizing agent to dry skin as needed. Appied primary and secondary dressing as ordered. Applied Unna roll from toes to knee overlapping each time. Applied ace wrap or coban from toes to below the knee. Secured with tape and/or metal clips covered with tape. Instructed patient/caregiver to keep dressing dry and intact. DO NOT REMOVE DRESSING. Instructed pt/family/caregiver to report excessive draining, loose bandage, wet dressing, severe pain or tingling in toes. Applied [employer] dressing below the knee to left lower leg. Unna Boot(s) were applied per  Guidelines. Response to treatment: Well tolerated by patient  Electronically signed by Sally Ervin RN on 1/4/2021 at 2:23 PM

## 2021-01-04 NOTE — WOUND CARE
[address] Progress Note and Procedure Note Conrad Messer MEDICAL RECORD NUMBER:  [id number] AGE: 76 y.o. RACE WHITE OR   GENDER: male  : 1955 EPISODE DATE:  2021 Subjective:  
Has been hospitalized with Covid-19 pulmonary disease, 26 days in patient, not on ventilator Wound care in hospital with hydrofera blue, unna boot No new problems Chief Complaint Patient presents with  Wound Check L lower leg HISTORY of PRESENT ILLNESS HPI Conrad Messer is a 76 y.o. male who presents today for wound/ulcer evaluation. History of Wound Context: LLE VSU Wound/Ulcer Pain Timing/Severity: mild Quality of pain: burning Severity:  1 / 10 Modifying Factors: Pain is relieved/improved with rest 
Associated Signs/Symptoms: edema Ulcer Identification: 
Ulcer Type: venous Contributing Factors: edema Wound: L leg PAST MEDICAL HISTORY Past Medical History:  
Diagnosis Date  Arthritis  Chronic obstructive pulmonary disease ([address]  Diabetes ([facility]  Gout  Hypertension  Sleep apnea PAST SURGICAL HISTORY Past Surgical History:  
Procedure Laterality Date  HX ORTHOPAEDIC    
 HX VEIN ABLATION ADHESIVE    
 
 
FAMILY HISTORY Family History Problem Relation Age of Onset  Heart Disease Mother  Kidney Disease Mother  Hypertension Mother  Diabetes Mother  Heart Disease Father  Hypertension Father  Diabetes Sister  Diabetes Brother SOCIAL HISTORY Social History Tobacco Use  Smoking status: Never Smoker  Smokeless tobacco: Never Used Substance Use Topics  Alcohol use: Yes  Drug use: Never ALLERGIES Allergies Allergen Reactions  Elavil Unknown (comments)  Sulfa (Sulfonamide Antibiotics) Nausea and Vomiting MEDICATIONS Current Outpatient Medications on File Prior to Encounter Medication Sig Dispense Refill  multivits,Stress Formula-Zinc tablet Take 1 Tab by mouth daily.  vitamin e (E GEMS) 1,000 unit capsule Take 1,000 Units by mouth two (2) times a day.  multivitamin (ONE A DAY) tablet Take 1 Tab by mouth daily.  ferrous sulfate (Iron) 325 mg (65 mg iron) tablet Take  by mouth Daily (before breakfast).  aspirin 81 mg chewable tablet Take 81 mg by mouth daily.  flunisolide (NASAREL) 25 mcg (0.025 %) spry 2 Sprays two (2) times a day.  albuterol (ProAir HFA) 90 mcg/actuation inhaler Take  by inhalation.  colchicine 0.6 mg tablet Take 0.6 mg by mouth daily.  gabapentin (NEURONTIN) 300 mg capsule Take 300 mg by mouth four (4) times daily.  oxyCODONE IR (ROXICODONE) 5 mg immediate release tablet Take 5 mg by mouth every twelve (12) hours.  furosemide (Lasix) 40 mg tablet Take 40 mg by mouth daily.  amLODIPine (NORVASC) 5 mg tablet Take 5 mg by mouth daily.  metFORMIN (GLUCOPHAGE) 500 mg tablet Take 1,000 mg by mouth two (2) times daily (with meals).  hydroCHLOROthiazide (HYDRODIURIL) 25 mg tablet Take 25 mg by mouth daily.  insulin lispro (HumaLOG U-100 Insulin) 100 unit/mL injection by SubCUTAneous route. Sliding scale  insulin glargine (LANTUS) 100 unit/mL injection by SubCUTAneous route nightly. 80units am/ 20units pm    
 
No current facility-administered medications on file prior to encounter. REVIEW OF SYSTEMS Pertinent items are noted in HPI. Objective:  
 
Visit Vitals /74 (BP 1 Location: Left arm, BP Patient Position: Sitting) Pulse (!) 106 Temp 98.4 °F (36.9 °C) Resp 18 Wt Readings from Last 3 Encounters:  
11/23/20 (!) 166.5 kg (367 lb) PHYSICAL EXAM 
 
Pertinent items are noted in HPI. L leg wound is small, superficial, no infection Assessment:  
L leg wound slowly improving Problem List Items Addressed This Visit None POP IN PROCEDURE TYPE (DEBRIDEMENT, BIOPSY, I&D, SKIN SUB, CHEMICAL CAUERTY) Plan:  
hydrofera blue, Fuad Negron Treatment Note please see attached Discharge Instructions Written patient dismissal instructions given to patient or POA. Electronically signed by Sahil Ramires MD on 1/4/2021 at 2:33 PM 
 
 
 
 
 
 
Debridement Wound Care Problem List Items Addressed This Visit None Procedure Note Indications:  Based on my examination of this patient's wound(s)/ulcer(s) today, debridement is required to promote healing and evaluate the wound base. Performed by: Sahil Ramires MD 
 
Consent obtained: Yes Time out taken: Yes Debridement: Excisional 
 
Using curette the wound(s)/ulcer(s) was/were sharply debrided down through and including the removal of   
subcutaneous tissue Devitalized Tissue Debrided: slough Pre Debridement Measurements: 
Are located in the [place]  Documentation Flow Sheet Wound/Ulcer #: 1 Post Debridement Measurements: 
Wound/Ulcer Descriptions are Pre Debridement except measurements:Non-Pressure ulcer, fat layer exposed Wound Leg lower Left;Lateral #1 (Active) Wound Image   01/04/21 1359 Wound Etiology Venous 01/04/21 1359 Dressing Status Clean;Dry; Intact 01/04/21 1359 Cleansed Soap and water 01/04/21 1359 Dressing/Treatment Fuad Negron boot 10/26/20 1002 Wound Length (cm) 4 cm 01/04/21 1359 Wound Width (cm) 1.8 cm 01/04/21 1359 Wound Depth (cm) 0.2 cm 01/04/21 1359 Wound Surface Area (cm^2) 7.2 cm^2 01/04/21 1359 Change in Wound Size % 71.9 01/04/21 1359 Wound Volume (cm^3) 1.44 cm^3 01/04/21 1359 Wound Healing % 72 01/04/21 1359 Post-Procedure Length (cm) 4 cm 01/04/21 1424 Post-Procedure Width (cm) 1.8 cm 01/04/21 1424 Post-Procedure Depth (cm) 0.2 cm 01/04/21 1424 Post-Procedure Surface Area (cm^2) 7.2 cm^2 01/04/21 1424 Post-Procedure Volume (cm^3) 1.44 cm^3 01/04/21 1424 Wound Assessment Granulation tissue;Slough 01/04/21 1359 Drainage Amount Moderate 01/04/21 1359 Drainage Description Serosanguinous 01/04/21 1359 Wound Odor None 01/04/21 1359 Alissa-Wound/Incision Assessment Intact 01/04/21 1359 Edges Attached edges 01/04/21 1359 Wound Thickness Description Full thickness 01/04/21 1359 Number of days: 112 Percent of Wound(s)/Ulcer(s) Debrided: 50% Total Surface Area Debrided:  4 sq cm Diabetic/Pressure/Non Pressure Ulcers only: 
Ulcer:  
 
Estimated Blood Loss:  Minimal  
 
Hemostasis Achieved: Pressure Procedural Pain: 1 / 10 Post Procedural Pain: 0 / 10 Response to treatment: Well tolerated by patient

## 2021-01-04 NOTE — DISCHARGE INSTRUCTIONS
Discharge Instructions for  [address] [place] [address], [address] [place] [address]  Telephone: [phone]    NAME:  Lulu Fountain OF BIRTH:  1955  MEDICAL RECORD NUMBER:  [id number]  DATE:  1/4/2021      Return Appointment:  [] Dressing supply provider:   [x] Home Healthcare: [facility]  [x] Return Appointment: 1 Week(s)  [] Nurse Visit: Franky Ulloa    [] Discharge from [facility]  [] Ordered tests:   [] Referrals:   [] Rx:     Wound Cleansing:   Do not scrub or use excessive force. Cleanse wound prior to applying a clean dressing with:  [x] Normal Saline   [x] Mild Soap & Water    [] Keep Wound Dry in Shower  [] Other:      Topical Treatments:  Do not apply lotions, creams, or ointments to wound bed unless directed. [x] Apply moisturizing lotion to skin surrounding the wound prior to dressing change.  [] Apply antifungal ointment to skin surrounding the wound prior to dressing change.  [] Apply thin film of moisture barrier ointment to skin immediately around wound. [] Other:       Dressings:           Wound Location Left leg  [x] Apply Primary Dressing:       [] MediHoney Gel    [] MediHoney Alginate               [] Calcium Alginate      [] Calcium Alginate with Silver   [] Collagen                   [] Collagen with Silver                [] Santyl with Moistened saline gauze              [x] Hydrofera Blue (cut to size and moistened)  [] Hydrofera Blue Ready (Cut to size)   [] NS wet to dry    [] Betadine wet to dry              [] Hydrogel                [] Mepitel     [] Bactroban/Mupirocin               [] Other:     [x] Cover and Secure with:     [x] Gauze [] Solis Kiss [] Kerlix   [] Foam [] Super Absorbant [x] ABD     [] ACE Wrap            [] Other:    Avoid contact of tape with skin.     [x] Change dressing: [] Daily    [] Every Other Day [] Once per week   [] Twice per week   [] Three times per week [x] Do Not Change Dressing   [] Other:    Compression:  Apply: [x] Multilayer Compression Wrap: []RightLeg [x]Left Leg                                 []Profore  []Profore Lite             [x]Unna     [x] Do not get leg(s) with wrap wet. If wraps become too tight call the center or completely remove the wrap. [x] Elevate leg(s) above the level of the heart when sitting. [x] Avoid prolonged standing in one place. Dietary:  [x] Diet as tolerated: [] Calorie Diabetic Diet: [x] No Added Salt:  [] Increase Protein: [] Other:     Activity:  [x] Activity as tolerated:    [] Patient has no activity restrictions       [] Strict Bedrest:   [] Remain off Work:       [] May return to full duty work:                                     [] Return to work with restrictions:                Electronically signed Fab Barrios RN on 1/4/2021 at 2:19 PM     Kiah Mathews 281: Should you experience any significant changes in your wound(s) or have questions about your wound care, please contact Thais Doyle 26 at [place] [phone] 8:00 am - 4:30. If you need help with your wound outside of these hours and cannot wait until we are again available, contact your PCP or go to the hospital emergency room. PLEASE NOTE: IF YOU ARE UNABLE TO OBTAIN WOUND SUPPLIES, CONTINUE TO USE THE SUPPLIES YOU HAVE AVAILABLE UNTIL YOU ARE ABLE TO REACH US. IT IS MOST IMPORTANT TO KEEP THE WOUND COVERED AT ALL TIMES.     [x] Dr. Adrianna Diaz   [] Dr. Shelbi Navarro  [] [facility]  [] Dr. Chris Small

## 2021-01-11 ENCOUNTER — HOSPITAL ENCOUNTER (OUTPATIENT)
Dept: WOUND CARE | Age: 66
Discharge: HOME | End: 2021-01-11
Payer: MEDICARE

## 2021-01-11 VITALS
SYSTOLIC BLOOD PRESSURE: 127 MMHG | RESPIRATION RATE: 18 BRPM | DIASTOLIC BLOOD PRESSURE: 68 MMHG | TEMPERATURE: 99.6 F | OXYGEN SATURATION: 98 % | HEART RATE: 107 BPM

## 2021-01-11 DIAGNOSIS — R60.0 LOCALIZED EDEMA: ICD-10-CM

## 2021-01-11 DIAGNOSIS — L97.929 IDIOPATHIC CHRONIC VENOUS HYPERTENSION OF LEFT LOWER EXTREMITY WITH ULCER (HCC): ICD-10-CM

## 2021-01-11 DIAGNOSIS — I87.312 IDIOPATHIC CHRONIC VENOUS HYPERTENSION OF LEFT LOWER EXTREMITY WITH ULCER (HCC): ICD-10-CM

## 2021-01-11 PROCEDURE — 11042 DBRDMT SUBQ TIS 1ST 20SQCM/<: CPT

## 2021-01-11 RX ORDER — LIDOCAINE HYDROCHLORIDE 20 MG/ML
15 SOLUTION OROPHARYNGEAL AS NEEDED
Status: DISCONTINUED | OUTPATIENT
Start: 2021-01-11 | End: 2021-01-13 | Stop reason: HOSPADM

## 2021-01-11 NOTE — DISCHARGE INSTRUCTIONS
Discharge Instructions for  [address] [place] [address]  Telephone: [phone]    NAME:  Que Milton OF BIRTH:  1955  MEDICAL RECORD NUMBER:  [id number]  DATE:  1/11/2021      Return Appointment:  [] Dressing supply provider:   [x] [facility]: [facility]   [x] Return Appointment: 1 Week(s)  [] Nurse Visit: Berna Mcarthur)    [] [facility]charge from [facility]  [] Ordered tests:   [] Referrals:   [] Rx:     Wound Cleansing:   Do not scrub or use excessive force. Cleanse wound prior to applying a clean dressing with:  [x] Normal Saline   [x] Mild Soap & Water    [] Keep Wound Dry in Shower  [] Other:      Topical Treatments:  Do not apply lotions, creams, or ointments to wound bed unless directed. [x] Apply moisturizing lotion to skin surrounding the wound prior to dressing change.  [] Apply antifungal ointment to skin surrounding the wound prior to dressing change.  [] Apply thin film of moisture barrier ointment to skin immediately around wound. [] Other:       Dressings:           Wound Location Left  leg   [x] Apply Primary Dressing:       [] MediHoney Gel    [] MediHoney Alginate               [] Calcium Alginate      [] Calcium Alginate with Silver   [] Collagen                   [] Collagen with Silver                [] Santyl with Moistened saline gauze              [x] Hydrofera Blue (cut to size and moistened)  [] Hydrofera Blue Ready (Cut to size)   [] NS wet to dry    [] Betadine wet to dry              [] Hydrogel                [] Mepitel     [] Bactroban/Mupirocin               [x] Other: Drawtex    [x] Cover and Secure with:     [x] Gauze [] Eileen Dunk [] Kerlix   [] Foam [] Super Absorbant [] ABD     [] ACE Wrap            [] Other:    Avoid contact of tape with skin.     [x] Change dressing: [] Daily    [] Every Other Day [] Once per week   [] Twice per week   [] Three times per week [x] Do Not Change Dressing [] Other:     Compression:  Apply: [x] Multilayer Compression Wrap: []RightLeg [x]Left Leg                                 []Profore  []Profore Lite             [x]Unna     [x] Do not get leg(s) with wrap wet. If wraps become too tight call the center or completely remove the wrap. [x] Elevate leg(s) above the level of the heart when sitting. [x] Avoid prolonged standing in one place. Dietary:  [] Diet as tolerated: [] Calorie Diabetic Diet: [x] No Added Salt:  [] Increase Protein: [] Other:     Activity:  [x] Activity as tolerated:    [] Patient has no activity restrictions       [] Strict Bedrest:   [] Remain off Work:       [] May return to full duty work:                                     [] Return to work with restrictions:                Electronically signed Dariel Park RN on 1/11/2021 at 2:24 PM     Kiah Mathews 281: Should you experience any significant changes in your wound(s) or have questions about your wound care, please contact Thais Doyle 26 at [place] [phone] 8:00 am - 4:30. If you need help with your wound outside of these hours and cannot wait until we are again available, contact your PCP or go to the hospital emergency room. PLEASE NOTE: IF YOU ARE UNABLE TO OBTAIN WOUND SUPPLIES, CONTINUE TO USE THE SUPPLIES YOU HAVE AVAILABLE UNTIL YOU ARE ABLE TO REACH US. IT IS MOST IMPORTANT TO KEEP THE WOUND COVERED AT ALL TIMES.     [x] Dr. Jason Esparza   [] Dr. Christa An  [] [facility]  [] Dr. Neil Johns

## 2021-01-11 NOTE — WOUND CARE
[address] Progress Note and Procedure Note Ab Parisi MEDICAL RECORD NUMBER:  [id number] AGE: 76 y.o. RACE WHITE OR   GENDER: male  : 1955 EPISODE DATE:  2021 Subjective: No fever, no increased drainage or pain Chief Complaint Patient presents with  Wound Check  
  left lateral lower leg HISTORY of PRESENT ILLNESS HPI Ab Parisi is a 76 y.o. male who presents today for wound/ulcer evaluation. History of Wound Context: LLE Wound/Ulcer Pain Timing/Severity: mild Quality of pain: sharp Severity:  1 / 10 Modifying Factors: Pain is relieved/improved with rest 
Associated Signs/Symptoms: edema Ulcer Identification: 
Ulcer Type: venous Contributing Factors: diabetes Wound: LLE  
     
PAST MEDICAL HISTORY Past Medical History:  
Diagnosis Date  Arthritis  Chronic obstructive pulmonary disease ([facility]  Diabetes ([facility] [address]  Gout  Hypertension  Sleep apnea PAST SURGICAL HISTORY Past Surgical History:  
Procedure Laterality Date  HX ORTHOPAEDIC    
 HX VEIN ABLATION ADHESIVE    
 
 
FAMILY HISTORY Family History Problem Relation Age of Onset  Heart Disease Mother  Kidney Disease Mother  Hypertension Mother  Diabetes Mother  Heart Disease Father  Hypertension Father  Diabetes Sister  Diabetes Brother SOCIAL HISTORY Social History Tobacco Use  Smoking status: Never Smoker  Smokeless tobacco: Never Used Substance Use Topics  Alcohol use: Yes  Drug use: Never ALLERGIES Allergies Allergen Reactions  Elavil Unknown (comments)  Sulfa (Sulfonamide Antibiotics) Nausea and Vomiting MEDICATIONS Current Outpatient Medications on File Prior to Encounter Medication Sig Dispense Refill  multivits,Stress Formula-Zinc tablet Take 1 Tab by mouth daily.  vitamin e (E GEMS) 1,000 unit capsule Take 1,000 Units by mouth two (2) times a day.  multivitamin (ONE A DAY) tablet Take 1 Tab by mouth daily.  ferrous sulfate (Iron) 325 mg (65 mg iron) tablet Take  by mouth Daily (before breakfast).  aspirin 81 mg chewable tablet Take 81 mg by mouth daily.  flunisolide (NASAREL) 25 mcg (0.025 %) spry 2 Sprays two (2) times a day.  albuterol (ProAir HFA) 90 mcg/actuation inhaler Take  by inhalation.  colchicine 0.6 mg tablet Take 0.6 mg by mouth daily.  gabapentin (NEURONTIN) 300 mg capsule Take 300 mg by mouth four (4) times daily.  oxyCODONE IR (ROXICODONE) 5 mg immediate release tablet Take 5 mg by mouth every twelve (12) hours.  furosemide (Lasix) 40 mg tablet Take 40 mg by mouth daily.  amLODIPine (NORVASC) 5 mg tablet Take 5 mg by mouth daily.  metFORMIN (GLUCOPHAGE) 500 mg tablet Take 1,000 mg by mouth two (2) times daily (with meals).  hydroCHLOROthiazide (HYDRODIURIL) 25 mg tablet Take 25 mg by mouth daily.  insulin lispro (HumaLOG U-100 Insulin) 100 unit/mL injection by SubCUTAneous route. Sliding scale  insulin glargine (LANTUS) 100 unit/mL injection by SubCUTAneous route nightly. 80units am/ 20units pm    
 
No current facility-administered medications on file prior to encounter. REVIEW OF SYSTEMS Pertinent items are noted in HPI. Objective:  
 
Visit Vitals /68 (BP 1 Location: Right arm, BP Patient Position: At rest;Sitting) Pulse (!) 107 Temp 99.6 °F (37.6 °C) Resp 18 SpO2 98% Wt Readings from Last 3 Encounters:  
11/23/20 (!) 166.5 kg (367 lb) PHYSICAL EXAM 
Wound area slightly less, superficial, no infection Pertinent items are noted in HPI. Assessment:  
 improving Problem List Items Addressed This Visit Idiopathic chronic venous hypertension of left lower extremity with ulcer ([facility] [address] Localized edema POP IN PROCEDURE TYPE (DEBRIDEMENT, BIOPSY, I&D, SKIN SUB, CHEMICAL CAUERTY) Plan:  
Continue hydrofera blue, Unna boot Treatment Note please see attached Discharge Instructions Written patient dismissal instructions given to patient or POA. Electronically signed by Sahil Ramires MD on 1/11/2021 at 2:32 PM 
 
 
 
 
 
 
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 Performed by: Sahil Ramires MD 
 
Consent obtained: Yes Time out taken: Yes Debridement: Excisional 
 
Using curette the wound(s)/ulcer(s) was/were sharply debrided down through and including the removal of   
subcutaneous tissue Devitalized Tissue Debrided: slough Pre Debridement Measurements: 
Are located in the [place]  Documentation Flow Sheet Wound/Ulcer #: 1 Post Debridement Measurements: 
Wound/Ulcer Descriptions are Pre Debridement except measurements:Non-Pressure ulcer, fat layer exposed Wound Leg lower Left;Lateral #1 (Active) Wound Image   01/11/21 1427 Wound Etiology Venous 01/11/21 1427 Dressing Status Clean;Dry; Intact 01/11/21 1427 Cleansed Soap and water 01/11/21 1427 Dressing/Treatment Fuad campos 10/26/20 1002 Wound Length (cm) 4 cm 01/11/21 1427 Wound Width (cm) 1.8 cm 01/11/21 1427 Wound Depth (cm) 0.2 cm 01/11/21 1427 Wound Surface Area (cm^2) 7.2 cm^2 01/11/21 1427 Change in Wound Size % 71.9 01/11/21 1427 Wound Volume (cm^3) 1.44 cm^3 01/11/21 1427 Wound Healing % 72 01/11/21 1427 Post-Procedure Length (cm) 4 cm 01/11/21 1428 Post-Procedure Width (cm) 1.8 cm 01/11/21 1428 Post-Procedure Depth (cm) 0.2 cm 01/11/21 1428 Post-Procedure Surface Area (cm^2) 7.2 cm^2 01/11/21 1428 Post-Procedure Volume (cm^3) 1.44 cm^3 01/11/21 1428 Wound Assessment Granulation tissue;Slough 01/11/21 1427 Drainage Amount Moderate 01/11/21 1427 Drainage Description Serosanguinous 01/11/21 1427 Wound Odor None 01/11/21 1427 Alissa-Wound/Incision Assessment Intact 01/11/21 1427 Edges Defined edges 01/11/21 1427 Wound Thickness Description Full thickness 01/11/21 1427 Number of days: 119 Percent of Wound(s)/Ulcer(s) Debrided: 70% Total Surface Area Debrided:  3 sq cm Diabetic/Pressure/Non Pressure Ulcers only: 
Ulcer:  
 
Estimated Blood Loss:  None Hemostasis Achieved: Pressure Procedural Pain: 0 / 10 Post Procedural Pain: 0 / 10 Response to treatment: Well tolerated by patient

## 2021-01-11 NOTE — WOUND CARE
[employer] Below Knee NAME:  Coy Pallas YOB: 1955 MEDICAL RECORD NUMBER:  [id number] DATE:  1/11/2021 Remove old Unna Boot or Boots. Applied moisturizing agent to dry skin as needed. Appied primary and secondary dressing as ordered. Applied Unna roll from toes to knee overlapping each time. Applied ace wrap or coban from toes to below the knee. Secured with tape and/or metal clips covered with tape. Instructed patient/caregiver to keep dressing dry and intact. DO NOT REMOVE DRESSING. Instructed pt/family/caregiver to report excessive draining, loose bandage, wet dressing, severe pain or tingling in toes. Applied [place] dressing below the knee to left lower leg. Unna Boot(s) were applied per  Guidelines. Response to treatment: Well tolerated by patient  Electronically signed by Marquis Lenard RN on 1/11/2021 at 2:53 PM

## 2021-01-11 NOTE — WOUND CARE
[facility], [address] [place] [address] Telephone: [phone] NAME:  Rexene Holter YOB: 1955 MEDICAL RECORD NUMBER:  [id number] DATE:  1/11/2021 Return Appointment: 
[] Dressing supply provider:  
[x] [facility]  
[x] Return Appointment: 1 Week(s) 
[] Nurse Visit:  Week(s) 
 
[] Discharge from [facility] [] Ordered tests:  
[] Referrals:  
[] Rx:  
 
Wound Cleansing: Do not scrub or use excessive force. Cleanse wound prior to applying a clean dressing with: 
[x] Normal Saline  
[x] Mild Soap & Water   
[] Keep Wound Dry in Shower 
[] Other:   
 
Topical Treatments: Do not apply lotions, creams, or ointments to wound bed unless directed. [x] Apply moisturizing lotion to skin surrounding the wound prior to dressing change. 
[] Apply antifungal ointment to skin surrounding the wound prior to dressing change. 
[] Apply thin film of moisture barrier ointment to skin immediately around wound. [] Other:   
  
Dressings:           Wound Location Left  leg [x] Apply Primary Dressing:     
 [] MediHoney Gel    [] MediHoney Alginate  
            [] Calcium Alginate      [] Calcium Alginate with Silver 
 [] Collagen                   [] Collagen with Silver   
            [] Santyl with Moistened saline gauze [x] Hydrofera Blue (cut to size and moistened)  [] Hydrofera Blue Ready (Cut to size) [] NS wet to dry    [] Betadine wet to dry 
            [] Hydrogel                [] Mepitel   
 [] Bactroban/Mupirocin  
            [x] Other: Drawtex [x] Cover and Secure with:   
 [x] Gauze [] Cristian [] Kerlix [] Foam [] Super Absorbant [] ABD [] ACE Wrap            [] Other:  
 Avoid contact of tape with skin. [x] Change dressing: [] Daily    [] Every Other Day [] Once per week   [] Twice per week 
 [] Three times per week [x] Do Not Change Dressing   [] Other: 
  
Compression: Apply: [x] Multilayer Compression Wrap: []RightLeg [x]Left Leg 
                               []Profore  []Profore Lite             [x]Unna [x] Do not get leg(s) with wrap wet. If wraps become too tight call the center or completely remove the wrap. [x] Elevate leg(s) above the level of the heart when sitting. [x] Avoid prolonged standing in one place. Dietary: 
[] Diet as tolerated: [] Calorie Diabetic Diet: [x] No Added Salt: 
[] Increase Protein: [] Other:  
 
Activity: 
[x] Activity as tolerated:   
[] Patient has no activity restrictions [] Strict Bedrest:  
[] Remain off Work:      
[] May return to full duty work:                                    
[] Return to work with restrictions:  
         
 
 Electronically signed Fab Barrios RN on 1/11/2021 at 2:24 PM  
 
Kiah Mathews 281: Should you experience any significant changes in your wound(s) or have questions about your wound care, please contact Thais Doyle 26 at [place] [address] 8:00 am - 4:30. If you need help with your wound outside of these hours and cannot wait until we are again available, contact your PCP or go to the hospital emergency room. PLEASE NOTE: IF YOU ARE UNABLE TO OBTAIN WOUND SUPPLIES, CONTINUE TO USE THE SUPPLIES YOU HAVE AVAILABLE UNTIL YOU ARE ABLE TO REACH US. IT IS MOST IMPORTANT TO KEEP THE WOUND COVERED AT ALL TIMES. [x] Dr. Adrianna Diaz  
[] Dr. Shelbi Navarro 
[] [facility] [] Dr. Chris Small

## 2021-01-18 ENCOUNTER — HOSPITAL ENCOUNTER (OUTPATIENT)
Dept: WOUND CARE | Age: 66
Discharge: HOME | End: 2021-01-18
Payer: MEDICARE

## 2021-01-18 VITALS
TEMPERATURE: 98.4 F | SYSTOLIC BLOOD PRESSURE: 134 MMHG | RESPIRATION RATE: 20 BRPM | OXYGEN SATURATION: 99 % | DIASTOLIC BLOOD PRESSURE: 71 MMHG | HEART RATE: 83 BPM

## 2021-01-18 DIAGNOSIS — R60.0 LOCALIZED EDEMA: ICD-10-CM

## 2021-01-18 DIAGNOSIS — I87.312 IDIOPATHIC CHRONIC VENOUS HYPERTENSION OF LEFT LOWER EXTREMITY WITH ULCER (HCC): ICD-10-CM

## 2021-01-18 DIAGNOSIS — L97.929 IDIOPATHIC CHRONIC VENOUS HYPERTENSION OF LEFT LOWER EXTREMITY WITH ULCER (HCC): ICD-10-CM

## 2021-01-18 PROCEDURE — 29580 STRAPPING UNNA BOOT: CPT

## 2021-01-18 PROCEDURE — 74011000250 HC RX REV CODE- 250: Performed by: SPECIALIST

## 2021-01-18 RX ORDER — LIDOCAINE HYDROCHLORIDE 20 MG/ML
15 SOLUTION OROPHARYNGEAL AS NEEDED
Status: DISCONTINUED | OUTPATIENT
Start: 2021-01-18 | End: 2021-01-20 | Stop reason: HOSPADM

## 2021-01-18 NOTE — WOUND CARE
[address] [facility], [address] [place] [address] Telephone: [phone] NAME:  Jeannette Nixon YOB: 1955 MEDICAL RECORD NUMBER:  [id number] DATE:  1/18/2021 Return Appointment: 
[] Dressing supply provider:  
[x] Home Healthcare: [facility]  
[x] Return Appointment: 1 Week(s) 
[] Nurse Visit:  Week(s) 
 
[] Discharge from [facility] [] Ordered tests:  
[] Referrals:  
[] Rx:  
 
Wound Cleansing: Do not scrub or use excessive force. Cleanse wound prior to applying a clean dressing with: 
[x] Normal Saline  
[x] Mild Soap & Water   
[] Keep Wound Dry in Shower 
[] Other:   
 
Topical Treatments: Do not apply lotions, creams, or ointments to wound bed unless directed. [x] Apply moisturizing lotion to skin surrounding the wound prior to dressing change. 
[] Apply antifungal ointment to skin surrounding the wound prior to dressing change. 
[] Apply thin film of moisture barrier ointment to skin immediately around wound. [] Other:   
  
Dressings:           Wound Location Left leg [x] Apply Primary Dressing:     
 [] MediHoney Gel    [] MediHoney Alginate  
            [] Calcium Alginate      [] Calcium Alginate with Silver 
 [] Collagen                   [] Collagen with Silver   
            [] Santyl with Moistened saline gauze [x] Hydrofera Blue (cut to size and moistened)  [] Hydrofera Blue Ready (Cut to size) [] NS wet to dry    [] Betadine wet to dry 
            [] Hydrogel                [] Mepitel   
 [] Bactroban/Mupirocin  
            [] Other:  
 
[x] Cover and Secure with:   
 [x] Gauze [] Cristian [] Kerlix [] Foam [] Super Absorbant [x] ABD [] ACE Wrap            [] Other:  
 Avoid contact of tape with skin. [x] Change dressing: [] Daily    [] Every Other Day [] Once per week   [] Twice per week 
 [] Three times per week [x] Do Not Change Dressing   [] Other: 
  
Compression: Apply: [x] Multilayer Compression Wrap: []RightLeg [x]Left Leg 
                               []Profore  []Profore Lite             [x]Unna [x] Do not get leg(s) with wrap wet. If wraps become too tight call the center or completely remove the wrap. [x] Elevate leg(s) above the level of the heart when sitting. [x] Avoid prolonged standing in one place. Dietary: 
[x] Diet as tolerated: [] Calorie Diabetic Diet: [x] No Added Salt: 
[] Increase Protein: [] Other:  
 
Activity: 
[x] Activity as tolerated:   
[] Patient has no activity restrictions [] Strict Bedrest:  
[] Remain off Work:      
[] May return to full duty work:                                    
[] Return to work with restrictions:  
         
 
 Electronically signed Evan Edwards RN on 1/11/2021 at 2:24 PM  
 
Kiah Mathews 281: Should you experience any significant changes in your wound(s) or have questions about your wound care, please contact Thais Doyle 26 at [place] [address] 8:00 am - 4:30. If you need help with your wound outside of these hours and cannot wait until we are again available, contact your PCP or go to the hospital emergency room. PLEASE NOTE: IF YOU ARE UNABLE TO OBTAIN WOUND SUPPLIES, CONTINUE TO USE THE SUPPLIES YOU HAVE AVAILABLE UNTIL YOU ARE ABLE TO REACH US. IT IS MOST IMPORTANT TO KEEP THE WOUND COVERED AT ALL TIMES. [x] Dr. Bang Lynch  
[] Dr. Beth Berg 
[] [facility] [] Dr. Kelechi Ly

## 2021-01-18 NOTE — DISCHARGE INSTRUCTIONS
[address] [place] [address] [address], [address] [facility]  Telephone: [phone]    NAME:  Lulu Fountain OF BIRTH:  1955  MEDICAL RECORD NUMBER:  [id number]  DATE:  1/18/2021      Return Appointment:  [] Dressing supply provider:   [x] Home Healthcare: Home [facility]   [x] Return Appointment: 1 Week(s)  [] Nurse Visit:  Week(s)    [] Discharge from [facility]  [] Ordered tests:   [] Referrals:   [] Rx:     Wound Cleansing:   Do not scrub or use excessive force. Cleanse wound prior to applying a clean dressing with:  [x] Normal Saline   [x] Mild Soap & Water    [] Keep Wound Dry in Shower  [] Other:      Topical Treatments:  Do not apply lotions, creams, or ointments to wound bed unless directed. [x] Apply moisturizing lotion to skin surrounding the wound prior to dressing change.  [] Apply antifungal ointment to skin surrounding the wound prior to dressing change.  [] Apply thin film of moisture barrier ointment to skin immediately around wound. [] Other:       Dressings:           Wound Location Left leg   [x] Apply Primary Dressing:       [] MediHoney Gel    [] MediHoney Alginate               [] Calcium Alginate      [] Calcium Alginate with Silver   [] Collagen                   [] Collagen with Silver                [] Santyl with Moistened saline gauze              [x] Hydrofera Blue (cut to size and moistened)  [] Hydrofera Blue Ready (Cut to size)   [] NS wet to dry    [] Betadine wet to dry              [] Hydrogel                [] Mepitel     [] Bactroban/Mupirocin               [] Other:     [x] Cover and Secure with:     [x] Gauze [] Solis Kiss [] Kerlix   [] Foam [] Super Absorbant [x] ABD     [] ACE Wrap            [] Other:    Avoid contact of tape with skin.     [x] Change dressing: [] Daily    [] Every Other Day [] Once per week   [] Twice per week   [] Three times per week [x] Do Not Change Dressing   [] Other:     Compression:  Apply: [x] Multilayer Compression Wrap: []RightLeg [x]Left Leg                                 []Profore  []Profore Lite             [x]Unna     [x] Do not get leg(s) with wrap wet. If wraps become too tight call the center or completely remove the wrap. [x] Elevate leg(s) above the level of the heart when sitting. [x] Avoid prolonged standing in one place. Dietary:  [x] Diet as tolerated: [] Calorie Diabetic Diet: [x] No Added Salt:  [] Increase Protein: [] Other:     Activity:  [x] Activity as tolerated:    [] Patient has no activity restrictions       [] Strict Bedrest:   [] Remain off Work:       [] May return to full duty work:                                     [] Return to work with restrictions:                Electronically signed Caroline Walters RN on 1/11/2021 at 2:24 PM     Kiah [address]ws 281: Should you experience any significant changes in your wound(s) or have questions about your wound care, please contact Thais Doyle 26 at [place] [address] 8:00 am - 4:30. If you need help with your wound outside of these hours and cannot wait until we are again available, contact your PCP or go to the hospital emergency room. PLEASE NOTE: IF YOU ARE UNABLE TO OBTAIN WOUND SUPPLIES, CONTINUE TO USE THE SUPPLIES YOU HAVE AVAILABLE UNTIL YOU ARE ABLE TO REACH US. IT IS MOST IMPORTANT TO KEEP THE WOUND COVERED AT ALL TIMES.     [x] Dr. Fatimah Cary   [] Dr. Vikki Calderón  [] [facility]  [] Dr. Flaco Corley

## 2021-01-18 NOTE — WOUND CARE
[address] Progress Note and Procedure Note NO Procedure Marina Vaughn MEDICAL RECORD NUMBER:  [id number] AGE: 76 y.o. RACE WHITE OR   GENDER: male  : 1955 EPISODE DATE:  2021 Subjective: No new problems Chief Complaint Patient presents with  Wound Check  
  left lateral leg HISTORY of PRESENT ILLNESS HPI Marina Vaughn is a 76 y.o. male who presents today for wound/ulcer evaluation. History of Wound Context: LLE Wound/Ulcer Pain Timing/Severity: none Quality of pain: dull Severity:  0 / 10 Modifying Factors: None Associated Signs/Symptoms: edema Ulcer Identification: 
Ulcer Type: lymphedema Contributing Factors: diabetes and obesity Wound: L leg PAST MEDICAL HISTORY Past Medical History:  
Diagnosis Date  Arthritis  Chronic obstructive pulmonary disease ([address]  Diabetes ([address]  Gout  Hypertension  Sleep apnea PAST SURGICAL HISTORY Past Surgical History:  
Procedure Laterality Date  HX ORTHOPAEDIC    
 HX VEIN ABLATION ADHESIVE    
 
 
FAMILY HISTORY Family History Problem Relation Age of Onset  Heart Disease Mother  Kidney Disease Mother  Hypertension Mother  Diabetes Mother  Heart Disease Father  Hypertension Father  Diabetes Sister  Diabetes Brother SOCIAL HISTORY Social History Tobacco Use  Smoking status: Never Smoker  Smokeless tobacco: Never Used Substance Use Topics  Alcohol use: Yes  Drug use: Never ALLERGIES Allergies Allergen Reactions  Elavil Unknown (comments)  Sulfa (Sulfonamide Antibiotics) Nausea and Vomiting MEDICATIONS Current Outpatient Medications on File Prior to Encounter Medication Sig Dispense Refill  multivits,Stress Formula-Zinc tablet Take 1 Tab by mouth daily.  vitamin e (E GEMS) 1,000 unit capsule Take 1,000 Units by mouth two (2) times a day.  multivitamin (ONE A DAY) tablet Take 1 Tab by mouth daily.  ferrous sulfate (Iron) 325 mg (65 mg iron) tablet Take  by mouth Daily (before breakfast).  aspirin 81 mg chewable tablet Take 81 mg by mouth daily.  flunisolide (NASAREL) 25 mcg (0.025 %) spry 2 Sprays two (2) times a day.  albuterol (ProAir HFA) 90 mcg/actuation inhaler Take  by inhalation.  colchicine 0.6 mg tablet Take 0.6 mg by mouth daily.  gabapentin (NEURONTIN) 300 mg capsule Take 300 mg by mouth four (4) times daily.  oxyCODONE IR (ROXICODONE) 5 mg immediate release tablet Take 5 mg by mouth every twelve (12) hours.  furosemide (Lasix) 40 mg tablet Take 40 mg by mouth daily.  amLODIPine (NORVASC) 5 mg tablet Take 5 mg by mouth daily.  metFORMIN (GLUCOPHAGE) 500 mg tablet Take 1,000 mg by mouth two (2) times daily (with meals).  hydroCHLOROthiazide (HYDRODIURIL) 25 mg tablet Take 25 mg by mouth daily.  insulin lispro (HumaLOG U-100 Insulin) 100 unit/mL injection by SubCUTAneous route. Sliding scale  insulin glargine (LANTUS) 100 unit/mL injection by SubCUTAneous route nightly. 80units am/ 20units pm    
 
No current facility-administered medications on file prior to encounter. REVIEW OF SYSTEMS Pertinent items are noted in HPI. Objective:  
 
Visit Vitals /71 (BP 1 Location: Right arm, BP Patient Position: At rest;Sitting) Pulse 83 Temp 98.4 °F (36.9 °C) Resp 20 SpO2 99% Comment: on 3L Wt Readings from Last 3 Encounters:  
11/23/20 (!) 166.5 kg (367 lb) PHYSICAL EXAM 
L lateral leg wound area slightly less, superficial, no infection Pertinent items are noted in HPI. Assessment:  
Good progress Problem List Items Addressed This Visit Idiopathic chronic venous hypertension of left lower extremity with ulcer ([address] Localized edema Procedure Note Indications:  Based on my examination of this patient's wound(s)/ulcer(s) today, debridement is not required to promote healing and evaluate the wound base. Wound Leg lower Left;Lateral #1 (Active) Wound Image   01/18/21 1355 Wound Etiology Venous 01/18/21 1355 Dressing Status Clean;Dry; Intact 01/18/21 1355 Cleansed Soap and water 01/18/21 1355 Dressing/Treatment Marina Zach boot 10/26/20 1002 Wound Length (cm) 4 cm 01/18/21 1355 Wound Width (cm) 1.8 cm 01/18/21 1355 Wound Depth (cm) 0.2 cm 01/18/21 1355 Wound Surface Area (cm^2) 7.2 cm^2 01/18/21 1355 Change in Wound Size % 71.9 01/18/21 1355 Wound Volume (cm^3) 1.44 cm^3 01/18/21 1355 Wound Healing % 72 01/18/21 1355 Post-Procedure Length (cm) 4 cm 01/11/21 1428 Post-Procedure Width (cm) 1.8 cm 01/11/21 1428 Post-Procedure Depth (cm) 0.2 cm 01/11/21 1428 Post-Procedure Surface Area (cm^2) 7.2 cm^2 01/11/21 1428 Post-Procedure Volume (cm^3) 1.44 cm^3 01/11/21 1428 Wound Assessment Granulation tissue;Slough 01/18/21 1355 Drainage Amount Moderate 01/18/21 1355 Drainage Description Serosanguinous 01/18/21 1355 Wound Odor None 01/18/21 1355 Alissa-Wound/Incision Assessment Intact 01/18/21 1355 Edges Defined edges 01/18/21 1355 Wound Thickness Description Full thickness 01/18/21 1355 Number of days: 126 Plan:  
Continue hydrofera blue, unna boot Treatment Note please see attached Discharge Instructions Written patient dismissal instructions given to patient or POA.       
 
Electronically signed by Maira Espino MD on 1/18/2021 at 2:41 PM

## 2021-01-18 NOTE — WOUND CARE
[employer] Below Knee NAME:  Fawn Zepeda YOB: 1955 MEDICAL RECORD NUMBER:  [id number] DATE:  1/18/2021 Remove old Unna Boot or Boots. Applied moisturizing agent to dry skin as needed. Appied primary and secondary dressing as ordered. Applied Unna roll from toes to knee overlapping each time. Applied ace wrap or coban from toes to below the knee. Secured with tape and/or metal clips covered with tape. Instructed patient/caregiver to keep dressing dry and intact. DO NOT REMOVE DRESSING. Instructed pt/family/caregiver to report excessive draining, loose bandage, wet dressing, severe pain or tingling in toes. Applied [place] dressing below the knee to left lower leg. Unna Boot(s) were applied per  Guidelines. Response to treatment: Well tolerated by patient  Electronically signed by Bradford Cervantes LPN on 2/74/4397 at 1:25 PM

## 2021-01-25 ENCOUNTER — HOSPITAL ENCOUNTER (OUTPATIENT)
Dept: WOUND CARE | Age: 66
Discharge: HOME | End: 2021-01-25
Payer: MEDICARE

## 2021-01-25 VITALS
HEART RATE: 91 BPM | DIASTOLIC BLOOD PRESSURE: 85 MMHG | SYSTOLIC BLOOD PRESSURE: 160 MMHG | RESPIRATION RATE: 20 BRPM | TEMPERATURE: 98.1 F

## 2021-01-25 DIAGNOSIS — L97.929 IDIOPATHIC CHRONIC VENOUS HYPERTENSION OF LEFT LOWER EXTREMITY WITH ULCER (HCC): ICD-10-CM

## 2021-01-25 DIAGNOSIS — R60.0 LOCALIZED EDEMA: ICD-10-CM

## 2021-01-25 DIAGNOSIS — I87.312 IDIOPATHIC CHRONIC VENOUS HYPERTENSION OF LEFT LOWER EXTREMITY WITH ULCER (HCC): ICD-10-CM

## 2021-01-25 PROCEDURE — 11042 DBRDMT SUBQ TIS 1ST 20SQCM/<: CPT

## 2021-01-25 RX ORDER — LIDOCAINE HYDROCHLORIDE 20 MG/ML
15 SOLUTION OROPHARYNGEAL AS NEEDED
Status: DISCONTINUED | OUTPATIENT
Start: 2021-01-25 | End: 2021-01-27 | Stop reason: HOSPADM

## 2021-01-25 NOTE — WOUND CARE
[address] Progress Note and Procedure Note Marina Vaughn MEDICAL RECORD NUMBER:  [id number] AGE: 76 y.o. RACE WHITE OR   GENDER: male  : 1955 EPISODE DATE:  2021 Subjective: No new problems reported Chief Complaint Patient presents with  Wound Check  
  left lower leg HISTORY of PRESENT ILLNESS HPI Marina Vaughn is a 76 y.o. male who presents today for wound/ulcer evaluation. History of Wound Context: L leg Wound/Ulcer Pain Timing/Severity: none Quality of pain: dull Severity:  0 / 10 Modifying Factors: None Associated Signs/Symptoms: edema Ulcer Identification: 
Ulcer Type: venous Contributing Factors: lymphedema Wound: L leg PAST MEDICAL HISTORY Past Medical History:  
Diagnosis Date  Arthritis  Chronic obstructive pulmonary disease ([facility]  Diabetes ([facility]  Gout  Hypertension  Sleep apnea PAST SURGICAL HISTORY Past Surgical History:  
Procedure Laterality Date  HX ORTHOPAEDIC    
 HX VEIN ABLATION ADHESIVE    
 
 
FAMILY HISTORY Family History Problem Relation Age of Onset  Heart Disease Mother  Kidney Disease Mother  Hypertension Mother  Diabetes Mother  Heart Disease Father  Hypertension Father  Diabetes Sister  Diabetes Brother SOCIAL HISTORY Social History Tobacco Use  Smoking status: Never Smoker  Smokeless tobacco: Never Used Substance Use Topics  Alcohol use: Yes  Drug use: Never ALLERGIES Allergies Allergen Reactions  Elavil Unknown (comments)  Sulfa (Sulfonamide Antibiotics) Nausea and Vomiting MEDICATIONS Current Outpatient Medications on File Prior to Encounter Medication Sig Dispense Refill  multivits,Stress Formula-Zinc tablet Take 1 Tab by mouth daily.  vitamin e (E GEMS) 1,000 unit capsule Take 1,000 Units by mouth two (2) times a day.  multivitamin (ONE A DAY) tablet Take 1 Tab by mouth daily.  ferrous sulfate (Iron) 325 mg (65 mg iron) tablet Take  by mouth Daily (before breakfast).  aspirin 81 mg chewable tablet Take 81 mg by mouth daily.  flunisolide (NASAREL) 25 mcg (0.025 %) spry 2 Sprays two (2) times a day.  albuterol (ProAir HFA) 90 mcg/actuation inhaler Take  by inhalation.  colchicine 0.6 mg tablet Take 0.6 mg by mouth daily.  gabapentin (NEURONTIN) 300 mg capsule Take 300 mg by mouth four (4) times daily.  oxyCODONE IR (ROXICODONE) 5 mg immediate release tablet Take 5 mg by mouth every twelve (12) hours.  furosemide (Lasix) 40 mg tablet Take 40 mg by mouth daily.  amLODIPine (NORVASC) 5 mg tablet Take 5 mg by mouth daily.  metFORMIN (GLUCOPHAGE) 500 mg tablet Take 1,000 mg by mouth two (2) times daily (with meals).  hydroCHLOROthiazide (HYDRODIURIL) 25 mg tablet Take 25 mg by mouth daily.  insulin lispro (HumaLOG U-100 Insulin) 100 unit/mL injection by SubCUTAneous route. Sliding scale  insulin glargine (LANTUS) 100 unit/mL injection by SubCUTAneous route nightly. 80units am/ 20units pm    
 
No current facility-administered medications on file prior to encounter. REVIEW OF SYSTEMS Pertinent items are noted in HPI. Objective:  
 
Visit Vitals BP (!) 160/85 (BP Patient Position: At rest) Pulse 91 Temp 98.1 °F (36.7 °C) Resp 20 Wt Readings from Last 3 Encounters:  
11/23/20 (!) 166.5 kg (367 lb) PHYSICAL EXAM 
L leg wound measures smaller, slough debrided, no infection Pertinent items are noted in HPI. Assessment:  
Wound improved Problem List Items Addressed This Visit Idiopathic chronic venous hypertension of left lower extremity with ulcer ([address] Localized edema POP IN PROCEDURE TYPE (DEBRIDEMENT, BIOPSY, I&D, SKIN SUB, CHEMICAL CAUERTY) Plan:  
Continue hydrofera blue, Unna boot Treatment Note please see attached Discharge Instructions Written patient dismissal instructions given to patient or POA. Electronically signed by Nico Brandon MD on 1/25/2021 at 11:43 AM 
 
 
 
 
 
 
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 Performed by: Nico Brandon MD 
 
Consent obtained: Yes Time out taken: Yes Debridement: Excisional 
 
Using curette the wound(s)/ulcer(s) was/were sharply debrided down through and including the removal of   
dermis and subcutaneous tissue Devitalized Tissue Debrided: slough Pre Debridement Measurements: 
Are located in the [place]  Documentation Flow Sheet Non-Pressure ulcer, fat layer exposed Wound/Ulcer #: 1 Post Debridement Measurements: 
Wound/Ulcer Descriptions are Pre Debridement except measurements: 
 
Wound Leg lower Left;Lateral #1 (Active) Wound Image   01/25/21 1133 Wound Etiology Venous 01/25/21 1133 Dressing Status Clean;Dry; Intact 01/25/21 1133 Cleansed Soap and water 01/25/21 1133 Dressing/Treatment Brenna Jean beth 10/26/20 1002 Wound Length (cm) 4 cm 01/25/21 1133 Wound Width (cm) 1.8 cm 01/25/21 1133 Wound Depth (cm) 0.2 cm 01/25/21 1133 Wound Surface Area (cm^2) 7.2 cm^2 01/25/21 1133 Change in Wound Size % 71.9 01/25/21 1133 Wound Volume (cm^3) 1.44 cm^3 01/25/21 1133 Wound Healing % 72 01/25/21 1133 Post-Procedure Length (cm) 4 cm 01/25/21 1136 Post-Procedure Width (cm) 1.8 cm 01/25/21 1136 Post-Procedure Depth (cm) 0.2 cm 01/25/21 1136 Post-Procedure Surface Area (cm^2) 7.2 cm^2 01/25/21 1136 Post-Procedure Volume (cm^3) 1.44 cm^3 01/25/21 1136 Wound Assessment Granulation tissue;Slough 01/25/21 1133 Drainage Amount Small 01/25/21 1133 Drainage Description Serosanguinous 01/25/21 1133 Wound Odor None 01/25/21 1133 Alissa-Wound/Incision Assessment Intact 01/25/21 1133 Edges Defined edges 01/25/21 1133 Wound Thickness Description Full thickness 01/25/21 1133 Number of days: 133 Total Surface Area Debrided:  5 sq cm Estimated Blood Loss:  Minimal  
 
Hemostasis Achieved: Pressure Procedural Pain: 2 / 10 Post Procedural Pain: 0 / 10 Response to treatment: Well tolerated by patient

## 2021-01-25 NOTE — WOUND CARE
[address] Telephone: [phone] NAME:  Ab Parisi YOB: 1955 MEDICAL RECORD NUMBER:  [id number] DATE:  1/25/2021 Return Appointment: 
[] Dressing supply provider:  
[x] Home Healthcare: Home [facility]  
[x] Return Appointment: 1 Week(s) 
[] Nurse Visit:  Week(s) 
 
[] Discharge from [facility] [] Ordered tests:  
[] Referrals:  
[] Rx:  
 
Wound Cleansing: Do not scrub or use excessive force. Cleanse wound prior to applying a clean dressing with: 
[x] Normal Saline  
[x] Mild Soap & Water   
[] Keep Wound Dry in Shower 
[] Other:   
 
Topical Treatments: Do not apply lotions, creams, or ointments to wound bed unless directed. [x] Apply moisturizing lotion to skin surrounding the wound prior to dressing change. 
[] Apply antifungal ointment to skin surrounding the wound prior to dressing change. 
[] Apply thin film of moisture barrier ointment to skin immediately around wound. [] Other:   
  
Dressings:           Wound Location Left leg [x] Apply Primary Dressing:     
 [] MediHoney Gel    [] MediHoney Alginate  
            [] Calcium Alginate      [] Calcium Alginate with Silver 
 [] Collagen                   [] Collagen with Silver   
            [] Santyl with Moistened saline gauze [x] Hydrofera Blue (cut to size and moistened)  [] Hydrofera Blue Ready (Cut to size) [] NS wet to dry    [] Betadine wet to dry 
            [] Hydrogel                [] Mepitel   
 [] Bactroban/Mupirocin  
            [] Other:  
 
[x] Cover and Secure with:   
 [x] Gauze [] Cristian [] Kerlix [] Foam [] Super Absorbant [x] ABD [] ACE Wrap            [] Other:  
 Avoid contact of tape with skin. [x] Change dressing: [] Daily    [] Every Other Day [] Once per week   [] Twice per week 
 [] Three times per week [x] Do Not Change Dressing   [] Other: 
  
Compression: Apply: [x] Multilayer Compression Wrap: []RightLeg [x]Left Leg 
                               []Profore  []Profore Lite             [x]Unna [x] Do not get leg(s) with wrap wet. If wraps become too tight call the center or completely remove the wrap. [x] Elevate leg(s) above the level of the heart when sitting. [x] Avoid prolonged standing in one place. Dietary: 
[x] Diet as tolerated: [] Calorie Diabetic Diet: [x] No Added Salt: 
[] Increase Protein: [] Other:  
 
Activity: 
[x] Activity as tolerated:   
[] Patient has no activity restrictions [] Strict Bedrest:  
[] Remain off Work:      
[] May return to full duty work:                                    
[] Return to work with restrictions:  
         
 
[address] Information: Should you experience any significant changes in your wound(s) or have questions about your wound care, please contact Thais Caldera at [place] [address] 8:00 am - 4:30. If you need help with your wound outside of these hours and cannot wait until we are again available, contact your PCP or go to the hospital emergency room. PLEASE NOTE: IF YOU ARE UNABLE TO OBTAIN WOUND SUPPLIES, CONTINUE TO USE THE SUPPLIES YOU HAVE AVAILABLE UNTIL YOU ARE ABLE TO REACH US. IT IS MOST IMPORTANT TO KEEP THE WOUND COVERED AT ALL TIMES. [x] Dr. Rocael Talavera  
[] Dr. Kamla Paul 
[] [facility] [] Dr. Richard Sanchez

## 2021-01-25 NOTE — WOUND CARE
[employer] Below Knee NAME:  Ab Pariis YOB: 1955 MEDICAL RECORD NUMBER:  [id number] DATE:  1/25/2021 Remove old Unna Boot or Boots. Applied moisturizing agent to dry skin as needed. Appied primary and secondary dressing as ordered. Applied Unna roll from toes to knee overlapping each time. Applied ace wrap or coban from toes to below the knee. Secured with tape and/or metal clips covered with tape. Instructed patient/caregiver to keep dressing dry and intact. DO NOT REMOVE DRESSING. Instructed pt/family/caregiver to report excessive draining, loose bandage, wet dressing, severe pain or tingling in toes. Applied [place] dressing below the knee to left lower leg. Unna Boot(s) were applied per  Guidelines. Response to treatment: Well tolerated by patient  Electronically signed by Tor Bolton RN on 1/25/2021 at 11:39 AM

## 2021-01-25 NOTE — DISCHARGE INSTRUCTIONS
[address] [place] [address], [address] [place] [address]  Telephone: [phone]    NAME:  Benigno Saravia OF BIRTH:  1955  MEDICAL RECORD NUMBER:  [id number]  DATE:  1/25/2021      Return Appointment:  [] Dressing supply provider:   [x] Home Healthcare: [facility]   [x] Return Appointment: 1 Week(s) 2/1/2021 at 11:15am  [] Nurse Visit:  Racheal Arora    [] Discharge from [facility]  [] Ordered tests:   [] Referrals:   [] Rx:     Wound Cleansing:   Do not scrub or use excessive force. Cleanse wound prior to applying a clean dressing with:  [x] Normal Saline   [x] Mild Soap & Water    [] Keep Wound Dry in Shower  [] Other:      Topical Treatments:  Do not apply lotions, creams, or ointments to wound bed unless directed. [x] Apply moisturizing lotion to skin surrounding the wound prior to dressing change.  [] Apply antifungal ointment to skin surrounding the wound prior to dressing change.  [] Apply thin film of moisture barrier ointment to skin immediately around wound. [] Other:       Dressings:           Wound Location Left leg   [x] Apply Primary Dressing:       [] MediHoney Gel    [] MediHoney Alginate               [] Calcium Alginate      [] Calcium Alginate with Silver   [] Collagen                   [] Collagen with Silver                [] Santyl with Moistened saline gauze              [x] Hydrofera Blue (cut to size and moistened)  [] Hydrofera Blue Ready (Cut to size)   [] NS wet to dry    [] Betadine wet to dry              [] Hydrogel                [] Mepitel     [] Bactroban/Mupirocin               [] Other:     [x] Cover and Secure with:     [x] Gauze [] Kelechi Rosin [] Kerlix   [] Foam [] Super Absorbant [x] ABD     [] ACE Wrap            [] Other:    Avoid contact of tape with skin.     [x] Change dressing: [] Daily    [] Every Other Day [] Once per week   [] Twice per week   [] Three times per week [x] Do Not Change Dressing   [] Other:     Compression:  Apply: [x] Multilayer Compression Wrap: []RightLeg [x]Left Leg                                 []Profore  []Profore Lite             [x]Unna     [x] Do not get leg(s) with wrap wet. If wraps become too tight call the center or completely remove the wrap. [x] Elevate leg(s) above the level of the heart when sitting. [x] Avoid prolonged standing in one place. Dietary:  [x] Diet as tolerated: [] Calorie Diabetic Diet: [x] No Added Salt:  [] Increase Protein: [] Other:     Activity:  [x] Activity as tolerated:    [] Patient has no activity restrictions       [] Strict Bedrest:   [] Remain off Work:       [] May return to full duty work:                                     [] Return to work with restrictions:                 [address] [facility] [address] [facility] [address] Information: Should you experience any significant changes in your wound(s) or have questions about your wound care, please contact Thais Caldera at [place] [address] 8:00 am - 4:30. If you need help with your wound outside of these hours and cannot wait until we are again available, contact your PCP or go to the hospital emergency room. PLEASE NOTE: IF YOU ARE UNABLE TO OBTAIN WOUND SUPPLIES, CONTINUE TO USE THE SUPPLIES YOU HAVE AVAILABLE UNTIL YOU ARE ABLE TO REACH US. IT IS MOST IMPORTANT TO KEEP THE WOUND COVERED AT ALL TIMES.     [x] Dr. Brittni Waters   [] Dr. Rossi Wayne  [] Brisa [facility]  [] Dr. Gwen Martinez

## 2021-02-01 ENCOUNTER — HOSPITAL ENCOUNTER (OUTPATIENT)
Dept: WOUND CARE | Age: 66
Discharge: HOME | End: 2021-02-01
Payer: MEDICARE

## 2021-02-01 VITALS
HEART RATE: 92 BPM | RESPIRATION RATE: 20 BRPM | DIASTOLIC BLOOD PRESSURE: 98 MMHG | SYSTOLIC BLOOD PRESSURE: 154 MMHG | TEMPERATURE: 98.7 F | OXYGEN SATURATION: 100 %

## 2021-02-01 DIAGNOSIS — R60.0 LOCALIZED EDEMA: ICD-10-CM

## 2021-02-01 DIAGNOSIS — I87.312 IDIOPATHIC CHRONIC VENOUS HYPERTENSION OF LEFT LOWER EXTREMITY WITH ULCER (HCC): ICD-10-CM

## 2021-02-01 DIAGNOSIS — L97.929 IDIOPATHIC CHRONIC VENOUS HYPERTENSION OF LEFT LOWER EXTREMITY WITH ULCER (HCC): ICD-10-CM

## 2021-02-01 PROCEDURE — 11042 DBRDMT SUBQ TIS 1ST 20SQCM/<: CPT

## 2021-02-01 RX ORDER — LIDOCAINE HYDROCHLORIDE 20 MG/ML
15 SOLUTION OROPHARYNGEAL AS NEEDED
Status: DISCONTINUED | OUTPATIENT
Start: 2021-02-01 | End: 2021-02-03 | Stop reason: HOSPADM

## 2021-02-01 NOTE — DISCHARGE INSTRUCTIONS
[address] [facility], [address] [place] [address]  Telephone: [phone]    NAME:  Jigar Fan OF BIRTH:  1955  MEDICAL RECORD NUMBER:  [id number]  DATE:  2/1/2021      Return Appointment:  [] Dressing supply provider:   [] Home Healthcare:    [x] Return Appointment: 1 Week(s)  [] Nurse Visit:  Week(s)    [] Discharge from [facility]  [] Ordered tests:   [] Referrals:   [] Rx:     Wound Cleansing:   Do not scrub or use excessive force. Cleanse wound prior to applying a clean dressing with:  [x] Normal Saline   [x] Mild Soap & Water    [] Keep Wound Dry in Shower  [] Other:      Topical Treatments:  Do not apply lotions, creams, or ointments to wound bed unless directed. [x] Apply moisturizing lotion to skin surrounding the wound prior to dressing change.  [] Apply antifungal ointment to skin surrounding the wound prior to dressing change.  [] Apply thin film of moisture barrier ointment to skin immediately around wound. [] Other:       Dressings:           Wound Location Left leg   [x] Apply Primary Dressing:       [] MediHoney Gel    [] MediHoney Alginate               [] Calcium Alginate      [] Calcium Alginate with Silver   [] Collagen                   [] Collagen with Silver                [] Santyl with Moistened saline gauze              [x] Hydrofera Blue (cut to size and moistened)  [] Hydrofera Blue Ready (Cut to size)   [] NS wet to dry    [] Betadine wet to dry              [] Hydrogel                [] Mepitel     [] Bactroban/Mupirocin               [x] Other: Drawtex    [x] Cover and Secure with:     [x] Gauze [] Shirley Hose [] Kerlix   [] Foam [] Super Absorbant [] ABD     [] ACE Wrap            [] Other:    Avoid contact of tape with skin.     [x] Change dressing: [] Daily    [] Every Other Day [] Once per week   [] Twice per week   [] Three times per week [x] Do Not Change Dressing   [] Other:     Compression:  Apply: [x] Multilayer Compression Wrap: []RightLeg [x]Left Leg                                 []Profore  []Profore Lite             [x]Unna     [x] Do not get leg(s) with wrap wet. If wraps become too tight call the center or completely remove the wrap. [x] Elevate leg(s) above the level of the heart when sitting. [x] Avoid prolonged standing in one place. Dietary:  [x] Diet as tolerated: [] Calorie Diabetic Diet: [x] No Added Salt:  [] Increase Protein: [] Other:     Activity:  [x] Activity as tolerated:    [] Patient has no activity restrictions       [] Strict Bedrest:   [] Remain off Work:       [] May return to full duty work:                                     [] Return to work with restrictions:               [address] [facility] [address] [facility] [address] Information: Should you experience any significant changes in your wound(s) or have questions about your wound care, please contact Thais Caldera at [place] [address] 8:00 am - 4:30. If you need help with your wound outside of these hours and cannot wait until we are again available, contact your PCP or go to the hospital emergency room. PLEASE NOTE: IF YOU ARE UNABLE TO OBTAIN WOUND SUPPLIES, CONTINUE TO USE THE SUPPLIES YOU HAVE AVAILABLE UNTIL YOU ARE ABLE TO REACH US. IT IS MOST IMPORTANT TO KEEP THE WOUND COVERED AT ALL TIMES.     [x] Dr. Tess Lee   [] Dr. Brenda Ramirez  [] Moises Swartz [facility]  [] Dr. Tom Kaur

## 2021-02-01 NOTE — WOUND CARE
[address] [facility], [address] [place] [address] Telephone: [phone] NAME:  Alexander Sharma YOB: 1955 MEDICAL RECORD NUMBER:  [id number] DATE:  2/1/2021 Return Appointment: 
[] Dressing supply provider:  
[] Home Healthcare:   
[x] Return Appointment: 1 Week(s) 
[] Nurse Visit:  Week(s) 
 
[] Discharge from [facility] [] Ordered tests:  
[] Referrals:  
[] Rx:  
 
Wound Cleansing: Do not scrub or use excessive force. Cleanse wound prior to applying a clean dressing with: 
[x] Normal Saline  
[x] Mild Soap & Water   
[] Keep Wound Dry in Shower 
[] Other:   
 
Topical Treatments: Do not apply lotions, creams, or ointments to wound bed unless directed. [x] Apply moisturizing lotion to skin surrounding the wound prior to dressing change. 
[] Apply antifungal ointment to skin surrounding the wound prior to dressing change. 
[] Apply thin film of moisture barrier ointment to skin immediately around wound. [] Other:   
  
Dressings:           Wound Location Left leg [x] Apply Primary Dressing:     
 [] MediHoney Gel    [] MediHoney Alginate  
            [] Calcium Alginate      [] Calcium Alginate with Silver 
 [] Collagen                   [] Collagen with Silver   
            [] Santyl with Moistened saline gauze [x] Hydrofera Blue (cut to size and moistened)  [] Hydrofera Blue Ready (Cut to size) [] NS wet to dry    [] Betadine wet to dry 
            [] Hydrogel                [] Mepitel   
 [] Bactroban/Mupirocin  
            [x] Other: Drawtex [x] Cover and Secure with:   
 [x] Gauze [] Cristian [] Kerlix [] Foam [] Super Absorbant [] ABD [] ACE Wrap            [] Other:  
 Avoid contact of tape with skin. [x] Change dressing: [] Daily    [] Every Other Day [] Once per week   [] Twice per week 
 [] Three times per week [x] Do Not Change Dressing   [] Other: 
  
Compression: Apply: [x] Multilayer Compression Wrap: []RightLeg [x]Left Leg 
                               []Profore  []Profore Lite             [x]Unna [x] Do not get leg(s) with wrap wet. If wraps become too tight call the center or completely remove the wrap. [x] Elevate leg(s) above the level of the heart when sitting. [x] Avoid prolonged standing in one place. Dietary: 
[x] Diet as tolerated: [] Calorie Diabetic Diet: [x] No Added Salt: 
[] Increase Protein: [] Other:  
 
Activity: 
[x] Activity as tolerated:   
[] Patient has no activity restrictions [] Strict Bedrest:  
[] Remain off Work:      
[] May return to full duty work:                                    
[] Return to work with restrictions:  
         
 
[address] [facility] [address] Information: Should you experience any significant changes in your wound(s) or have questions about your wound care, please contact Thais Caldera at [place] [address] 8:00 am - 4:30. If you need help with your wound outside of these hours and cannot wait until we are again available, contact your PCP or go to the hospital emergency room. PLEASE NOTE: IF YOU ARE UNABLE TO OBTAIN WOUND SUPPLIES, CONTINUE TO USE THE SUPPLIES YOU HAVE AVAILABLE UNTIL YOU ARE ABLE TO REACH US. IT IS MOST IMPORTANT TO KEEP THE WOUND COVERED AT ALL TIMES. [x] Dr. Braulio Nixon  
[] Dr. Wellington Jacobsen 
[] Kait [facility] [] Dr. Yinka Acevedo

## 2021-02-01 NOTE — WOUND CARE
[address] Progress Note and Procedure Note Darvin Homans MEDICAL RECORD NUMBER:  [id number] AGE: 76 y.o. RACE WHITE [place]   GENDER: male  : 1955 EPISODE DATE:  2021 Subjective:  
Glucose 99 this a.m. No new problems reported Chief Complaint Patient presents with  Wound Check  
  left lateral leg HISTORY of PRESENT ILLNESS HPI Darvin Homans is a 76 y.o. male who presents today for wound/ulcer evaluation. History of Wound Context: L leg Wound/Ulcer Pain Timing/Severity: none Quality of pain: dull Severity:  1 / 10 Modifying Factors: None Associated Signs/Symptoms: edema Ulcer Identification: 
Ulcer Type: venous Contributing Factors: diabetes Wound: L leg PAST MEDICAL HISTORY Past Medical History:  
Diagnosis Date  Arthritis  Chronic obstructive pulmonary disease ([facility]  Diabetes ([facility]  Gout  Hypertension  Sleep apnea PAST SURGICAL HISTORY Past Surgical History:  
Procedure Laterality Date  HX ORTHOPAEDIC    
 HX VEIN ABLATION ADHESIVE    
 
 
FAMILY HISTORY Family History Problem Relation Age of Onset  Heart Disease Mother  Kidney Disease Mother  Hypertension Mother  Diabetes Mother  Heart Disease Father  Hypertension Father  Diabetes Sister  Diabetes Brother SOCIAL HISTORY Social History Tobacco Use  Smoking status: Never Smoker  Smokeless tobacco: Never Used Substance Use Topics  Alcohol use: Yes  Drug use: Never ALLERGIES Allergies Allergen Reactions  Elavil Unknown (comments)  Sulfa (Sulfonamide Antibiotics) Nausea and Vomiting MEDICATIONS Current Outpatient Medications on File Prior to Encounter Medication Sig Dispense Refill  multivits,Stress Formula-Zinc tablet Take 1 Tab by mouth daily.  vitamin e (E GEMS) 1,000 unit capsule Take 1,000 Units by mouth two (2) times a day.  multivitamin (ONE A DAY) tablet Take 1 Tab by mouth daily.  ferrous sulfate (Iron) 325 mg (65 mg iron) tablet Take  by mouth Daily (before breakfast).  aspirin 81 mg chewable tablet Take 81 mg by mouth daily.  flunisolide (NASAREL) 25 mcg (0.025 %) spry 2 Sprays two (2) times a day.  albuterol (ProAir HFA) 90 mcg/actuation inhaler Take  by inhalation.  colchicine 0.6 mg tablet Take 0.6 mg by mouth daily.  gabapentin (NEURONTIN) 300 mg capsule Take 300 mg by mouth four (4) times daily.  oxyCODONE IR (ROXICODONE) 5 mg immediate release tablet Take 5 mg by mouth every twelve (12) hours.  furosemide (Lasix) 40 mg tablet Take 40 mg by mouth daily.  amLODIPine (NORVASC) 5 mg tablet Take 5 mg by mouth daily.  metFORMIN (GLUCOPHAGE) 500 mg tablet Take 1,000 mg by mouth two (2) times daily (with meals).  hydroCHLOROthiazide (HYDRODIURIL) 25 mg tablet Take 25 mg by mouth daily.  insulin lispro (HumaLOG U-100 Insulin) 100 unit/mL injection by SubCUTAneous route. Sliding scale  insulin glargine (LANTUS) 100 unit/mL injection by SubCUTAneous route nightly. 80units am/ 20units pm    
 
No current facility-administered medications on file prior to encounter. REVIEW OF SYSTEMS Pertinent items are noted in HPI. Objective:  
 
Visit Vitals BP (!) 154/98 (BP 1 Location: Right lower arm, BP Patient Position: At rest;Sitting) Pulse 92 Temp 98.7 °F (37.1 °C) Resp 20 SpO2 100% Comment: 3 liters Wt Readings from Last 3 Encounters:  
11/23/20 (!) 166.5 kg (367 lb) PHYSICAL EXAM 
Wound measures slightly longer, surface area may be slightly less as wound shape changes Slough debrided, no infection Pertinent items are noted in HPI. Assessment:  
Slow improvement Problem List Items Addressed This Visit Idiopathic chronic venous hypertension of left lower extremity with ulcer ([address] Localized edema POP IN PROCEDURE TYPE (DEBRIDEMENT, BIOPSY, I&D, SKIN SUB, CHEMICAL CAUERTY) Plan:  
Continue hydrofera blue + Unna boot Treatment Note please see attached Discharge Instructions Written patient dismissal instructions given to patient or POA. Electronically signed by Macarena Arteaga MD on 2/1/2021 at 11:38 AM 
 
 
 
 
 
 
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 Performed by: Macarena Arteaga MD 
 
Consent obtained: Yes Time out taken: Yes Debridement: Excisional 
 
Using curette the wound(s)/ulcer(s) was/were sharply debrided down through and including the removal of   
subcutaneous tissue Devitalized Tissue Debrided: slough Pre Debridement Measurements: 
Are located in the [place]  Documentation Flow Sheet Non-Pressure ulcer, fat layer exposed Wound/Ulcer #: 1 Post Debridement Measurements: 
Wound/Ulcer Descriptions are Pre Debridement except measurements: 
 
Wound Leg lower Left;Lateral #1 (Active) Wound Image   02/01/21 1112 Wound Etiology Venous 02/01/21 1112 Dressing Status Clean;Dry; Intact 02/01/21 1112 Cleansed Soap and water 01/25/21 1133 Dressing/Treatment Ken Hatch boot 10/26/20 1002 Wound Length (cm) 4 cm 02/01/21 1112 Wound Width (cm) 1.9 cm 02/01/21 1112 Wound Depth (cm) 0.2 cm 02/01/21 1112 Wound Surface Area (cm^2) 7.6 cm^2 02/01/21 1112 Change in Wound Size % 70.34 02/01/21 1112 Wound Volume (cm^3) 1.52 cm^3 02/01/21 1112 Wound Healing % 70 02/01/21 1112 Post-Procedure Length (cm) 4 cm 02/01/21 1131 Post-Procedure Width (cm) 1.9 cm 02/01/21 1131 Post-Procedure Depth (cm) 0.2 cm 02/01/21 1131 Post-Procedure Surface Area (cm^2) 7.6 cm^2 02/01/21 1131 Post-Procedure Volume (cm^3) 1.52 cm^3 02/01/21 1131 Wound Assessment Granulation tissue;Slough 02/01/21 1112 Drainage Amount Moderate 02/01/21 1112 Drainage Description Serosanguinous 02/01/21 1112 Wound Odor None 02/01/21 1112 Alissa-Wound/Incision Assessment Hyperpigmented 02/01/21 1112 Edges Defined edges 02/01/21 1112 Wound Thickness Description Full thickness 02/01/21 1112 Number of days: 140 Total Surface Area Debrided:  7 sq cm Estimated Blood Loss:  Estimated amt of blood loss is 10 ml Hemostasis Achieved: Pressure Procedural Pain: 1 / 10 Post Procedural Pain: 0 / 10 Response to treatment: Well tolerated by patient

## 2021-02-01 NOTE — WOUND CARE
[employer] Below Knee NAME:  Ab Parisi YOB: 1955 MEDICAL RECORD NUMBER:  [id number] DATE:  2/1/2021 Remove old Unna Boot or Boots. Applied moisturizing agent to dry skin as needed. Appied primary and secondary dressing as ordered. Applied Unna roll from toes to knee overlapping each time. Applied ace wrap or coban from toes to below the knee. Secured with tape and/or metal clips covered with tape. Instructed patient/caregiver to keep dressing dry and intact. DO NOT REMOVE DRESSING. Instructed pt/family/caregiver to report excessive draining, loose bandage, wet dressing, severe pain or tingling in toes. Applied [place] dressing below the knee to left lower leg. Unna Boot(s) were applied per  Guidelines. Response to treatment: Well tolerated by patient  Electronically signed by Madi Dixon LPN on 2/3/3080 at 3:60 PM

## 2021-02-08 ENCOUNTER — HOSPITAL ENCOUNTER (OUTPATIENT)
Dept: WOUND CARE | Age: 66
Discharge: HOME | End: 2021-02-08
Payer: MEDICARE

## 2021-02-08 VITALS
RESPIRATION RATE: 20 BRPM | HEART RATE: 96 BPM | SYSTOLIC BLOOD PRESSURE: 151 MMHG | DIASTOLIC BLOOD PRESSURE: 75 MMHG | TEMPERATURE: 98.8 F

## 2021-02-08 DIAGNOSIS — L97.929 IDIOPATHIC CHRONIC VENOUS HYPERTENSION OF LEFT LOWER EXTREMITY WITH ULCER (HCC): ICD-10-CM

## 2021-02-08 DIAGNOSIS — R60.0 LOCALIZED EDEMA: ICD-10-CM

## 2021-02-08 DIAGNOSIS — I87.312 IDIOPATHIC CHRONIC VENOUS HYPERTENSION OF LEFT LOWER EXTREMITY WITH ULCER (HCC): ICD-10-CM

## 2021-02-08 PROCEDURE — 11042 DBRDMT SUBQ TIS 1ST 20SQCM/<: CPT

## 2021-02-08 RX ORDER — LIDOCAINE HYDROCHLORIDE 20 MG/ML
15 SOLUTION OROPHARYNGEAL AS NEEDED
Status: DISCONTINUED | OUTPATIENT
Start: 2021-02-08 | End: 2021-02-10 | Stop reason: HOSPADM

## 2021-02-08 NOTE — DISCHARGE INSTRUCTIONS
[address] [place] [address], [address] [place] [address]  Telephone: [phone]    NAME:  Roverto Norwood OF BIRTH:  1955  MEDICAL RECORD NUMBER:  [id number]  DATE:  2/8/2021      Return Appointment:  [] Dressing supply provider:   [] Home Healthcare:    [x] Return Appointment: 1 Week(s)  [] Nurse Visit:  Week(s)    [] Discharge from [facility]  [] Ordered tests:   [] Referrals:   [] Rx:     Wound Cleansing:   Do not scrub or use excessive force. Cleanse wound prior to applying a clean dressing with:  [x] Normal Saline   [x] Mild Soap & Water    [] Keep Wound Dry in Shower  [] Other:      Topical Treatments:  Do not apply lotions, creams, or ointments to wound bed unless directed. [x] Apply moisturizing lotion to skin surrounding the wound prior to dressing change.  [] Apply antifungal ointment to skin surrounding the wound prior to dressing change.  [] Apply thin film of moisture barrier ointment to skin immediately around wound. [] Other:       Dressings:           Wound Location Left leg   [x] Apply Primary Dressing:       [] MediHoney Gel    [] MediHoney Alginate               [] Calcium Alginate      [] Calcium Alginate with Silver   [] Collagen                   [] Collagen with Silver                [] Santyl with Moistened saline gauze              [x] Hydrofera Blue (cut to size and moistened)  [] Hydrofera Blue Ready (Cut to size)   [] NS wet to dry    [] Betadine wet to dry              [] Hydrogel                [] Mepitel     [] Bactroban/Mupirocin               [x] Other: Drawtex    [x] Cover and Secure with:     [x] Gauze [] Rudie Mings [] Kerlix   [] Foam [] Super Absorbant [] ABD     [] ACE Wrap            [] Other:    Avoid contact of tape with skin.     [x] Change dressing: [] Daily    [] Every Other Day [] Once per week   [] Twice per week   [] Three times per week [x] Do Not Change Dressing   [] Other:     Compression:  Apply: [x] Multilayer Compression Wrap: []RightLeg [x]Left Leg                                 []Profore  []Profore Lite             [x]Unna     [x] Do not get leg(s) with wrap wet. If wraps become too tight call the center or completely remove the wrap. [x] Elevate leg(s) above the level of the heart when sitting. [x] Avoid prolonged standing in one place. Dietary:  [x] Diet as tolerated: [] Calorie Diabetic Diet: [x] No Added Salt:  [] Increase Protein: [] Other:     Activity:  [x] Activity as tolerated:    [] Patient has no activity restrictions       [] Strict Bedrest:   [] Remain off Work:       [] May return to full duty work:                                     [] Return to work with restrictions:               [address] [facility] [address] Information: Should you experience any significant changes in your wound(s) or have questions about your wound care, please contact Thais Caldera at [place] [address] 8:00 am - 4:30. If you need help with your wound outside of these hours and cannot wait until we are again available, contact your PCP or go to the hospital emergency room. PLEASE NOTE: IF YOU ARE UNABLE TO OBTAIN WOUND SUPPLIES, CONTINUE TO USE THE SUPPLIES YOU HAVE AVAILABLE UNTIL YOU ARE ABLE TO REACH US. IT IS MOST IMPORTANT TO KEEP THE WOUND COVERED AT ALL TIMES.     [x] Dr. Brenda Castaneda   [] Dr. Ernesto Cardenas  [] Nakul Becerra [facility]  [] Dr. Jairo An

## 2021-02-08 NOTE — WOUND CARE
[address] [address] [facility], [address] [place] [address] Telephone: [phone] NAME:  Gabriel Frank YOB: 1955 MEDICAL RECORD NUMBER:  [id number] DATE:  2/8/2021 Return Appointment: 
[] Dressing supply provider:  
[] Home Healthcare:   
[x] Return Appointment: 1 Week(s) 
[] Nurse Visit:  Week(s) 
 
[] Discharge from [facility] [] Ordered tests:  
[] Referrals:  
[] Rx:  
 
Wound Cleansing: Do not scrub or use excessive force. Cleanse wound prior to applying a clean dressing with: 
[x] Normal Saline  
[x] Mild Soap & Water   
[] Keep Wound Dry in Shower 
[] Other:   
 
Topical Treatments: Do not apply lotions, creams, or ointments to wound bed unless directed. [x] Apply moisturizing lotion to skin surrounding the wound prior to dressing change. 
[] Apply antifungal ointment to skin surrounding the wound prior to dressing change. 
[] Apply thin film of moisture barrier ointment to skin immediately around wound. [] Other:   
  
Dressings:           Wound Location Left leg [x] Apply Primary Dressing:     
 [] MediHoney Gel    [] MediHoney Alginate  
            [] Calcium Alginate      [] Calcium Alginate with Silver 
 [] Collagen                   [] Collagen with Silver   
            [] Santyl with Moistened saline gauze [x] Hydrofera Blue (cut to size and moistened)  [] Hydrofera Blue Ready (Cut to size) [] NS wet to dry    [] Betadine wet to dry 
            [] Hydrogel                [] Mepitel   
 [] Bactroban/Mupirocin  
            [x] Other: Drawtex [x] Cover and Secure with:   
 [x] Gauze [] Cristian [] Kerlix [] Foam [] Super Absorbant [] ABD [] ACE Wrap            [] Other:  
 Avoid contact of tape with skin. [x] Change dressing: [] Daily    [] Every Other Day [] Once per week   [] Twice per week 
 [] Three times per week [x] Do Not Change Dressing   [] Other: 
  
Compression: Apply: [x] Multilayer Compression Wrap: []RightLeg [x]Left Leg 
                               []Profore  []Profore Lite             [x]Unna [x] Do not get leg(s) with wrap wet. If wraps become too tight call the center or completely remove the wrap. [x] Elevate leg(s) above the level of the heart when sitting. [x] Avoid prolonged standing in one place. Dietary: 
[x] Diet as tolerated: [] Calorie Diabetic Diet: [x] No Added Salt: 
[] Increase Protein: [] Other:  
 
Activity: 
[x] Activity as tolerated:   
[] Patient has no activity restrictions [] Strict Bedrest:  
[] Remain off Work:      
[] May return to full duty work:                                    
[] Return to work with restrictions:  
         
 
[address] [facility] [address] Information: Should you experience any significant changes in your wound(s) or have questions about your wound care, please contact Thais Caldera at [place] [address] 8:00 am - 4:30. If you need help with your wound outside of these hours and cannot wait until we are again available, contact your PCP or go to the hospital emergency room. PLEASE NOTE: IF YOU ARE UNABLE TO OBTAIN WOUND SUPPLIES, CONTINUE TO USE THE SUPPLIES YOU HAVE AVAILABLE UNTIL YOU ARE ABLE TO REACH US. IT IS MOST IMPORTANT TO KEEP THE WOUND COVERED AT ALL TIMES. [x] Dr. Lubna Arnold  
[] Dr. Carlos Nation 
[] [facility] [] Dr. Amalia Pepe

## 2021-02-08 NOTE — WOUND CARE
[employer] [facility] Knee NAME:  Ab Parisi YOB: 1955 MEDICAL RECORD NUMBER:  [id number] DATE:  2/8/2021 Remove old Unna Boot or Boots. Applied moisturizing agent to dry skin as needed. Appied primary and secondary dressing as ordered. Applied Unna roll from toes to knee overlapping each time. Applied ace wrap or coban from toes to below the knee. Secured with tape and/or metal clips covered with tape. Instructed patient/caregiver to keep dressing dry and intact. DO NOT REMOVE DRESSING. Instructed pt/family/caregiver to report excessive draining, loose bandage, wet dressing, severe pain or tingling in toes. Applied [place] dressing below the knee to left lower leg. Unna Boot(s) were applied per  Guidelines. Response to treatment: Well tolerated by patient  Electronically signed by Tor Bolton RN on 2/8/2021 at 2:30 PM

## 2021-02-08 NOTE — WOUND CARE
[address] Progress Note and Procedure Note Beth Fitzgerald MEDICAL RECORD NUMBER:  [id number] AGE: 76 y.o. RACE WHITE OR   GENDER: male  : 1955 EPISODE DATE:  2021 Subjective: No new problems Chief Complaint Patient presents with  Wound Check  
  left lateral leg HISTORY of PRESENT ILLNESS HPI Beth Fitzgerald is a 76 y.o. male who presents today for wound/ulcer evaluation. History of Wound Context: L leg Wound/Ulcer Pain Timing/Severity: mild Quality of pain: aching Severity:  1 / 10 Modifying Factors: Pain is relieved/improved with rest 
Associated Signs/Symptoms: edema Ulcer Identification: 
Ulcer Type: venous Contributing Factors: venous stasis, lymphedema and diabetes Wound: L leg PAST MEDICAL HISTORY Past Medical History:  
Diagnosis Date  Arthritis  Chronic obstructive pulmonary disease ([facility] [address]  Diabetes ([facility] [address] [facility]  Gout  Hypertension  Sleep apnea PAST SURGICAL HISTORY Past Surgical History:  
Procedure Laterality Date  HX ORTHOPAEDIC    
 HX VEIN ABLATION ADHESIVE    
 
 
FAMILY HISTORY Family History Problem Relation Age of Onset  Heart Disease Mother  Kidney Disease Mother  Hypertension Mother  Diabetes Mother  Heart Disease Father  Hypertension Father  Diabetes Sister  Diabetes Brother SOCIAL HISTORY Social History Tobacco Use  Smoking status: Never Smoker  Smokeless tobacco: Never Used Substance Use Topics  Alcohol use: Yes  Drug use: Never ALLERGIES Allergies Allergen Reactions  Elavil Unknown (comments)  Sulfa (Sulfonamide Antibiotics) Nausea and Vomiting MEDICATIONS Current Outpatient Medications on File Prior to Encounter Medication Sig Dispense Refill  multivits,Stress Formula-Zinc tablet Take 1 Tab by mouth daily.  vitamin e (E GEMS) 1,000 unit capsule Take 1,000 Units by mouth two (2) times a day.  multivitamin (ONE A DAY) tablet Take 1 Tab by mouth daily.  ferrous sulfate (Iron) 325 mg (65 mg iron) tablet Take  by mouth Daily (before breakfast).  aspirin 81 mg chewable tablet Take 81 mg by mouth daily.  flunisolide (NASAREL) 25 mcg (0.025 %) spry 2 Sprays two (2) times a day.  albuterol (ProAir HFA) 90 mcg/actuation inhaler Take  by inhalation.  colchicine 0.6 mg tablet Take 0.6 mg by mouth daily.  gabapentin (NEURONTIN) 300 mg capsule Take 300 mg by mouth four (4) times daily.  oxyCODONE IR (ROXICODONE) 5 mg immediate release tablet Take 5 mg by mouth every twelve (12) hours.  furosemide (Lasix) 40 mg tablet Take 40 mg by mouth daily.  amLODIPine (NORVASC) 5 mg tablet Take 5 mg by mouth daily.  metFORMIN (GLUCOPHAGE) 500 mg tablet Take 1,000 mg by mouth two (2) times daily (with meals).  hydroCHLOROthiazide (HYDRODIURIL) 25 mg tablet Take 25 mg by mouth daily.  insulin lispro (HumaLOG U-100 Insulin) 100 unit/mL injection by SubCUTAneous route. Sliding scale  insulin glargine (LANTUS) 100 unit/mL injection by SubCUTAneous route nightly. 80units am/ 20units pm    
 
No current facility-administered medications on file prior to encounter. REVIEW OF SYSTEMS Pertinent items are noted in HPI. Objective:  
 
Visit Vitals BP (!) 151/75 Pulse 96 Temp 98.8 °F (37.1 °C) Resp 20 Wt Readings from Last 3 Encounters:  
11/23/20 (!) 166.5 kg (367 lb) PHYSICAL EXAM 
L lateral leg wound changing shape, leading to reduction in surface area despite no change in L x W measurements Slough debrided, no infection Pertinent items are noted in HPI. Assessment:  
Wound changing shape but area less Problem List Items Addressed This Visit Idiopathic chronic venous hypertension of left lower extremity with ulcer ([address] Localized edema POP IN PROCEDURE TYPE (DEBRIDEMENT, BIOPSY, I&D, SKIN SUB, CHEMICAL CAUERTY) Plan:  
Continue [place] Look, Satya Medina Treatment Note please see attached Discharge Instructions Written patient dismissal instructions given to patient or POA. Electronically signed by Krys Loo MD on 2/8/2021 at 2:30 PM 
 
 
 
 
 
 
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 Performed by: Krys Loo MD 
 
Consent obtained: Yes Time out taken: Yes Debridement: Excisional 
 
Using curette the wound(s)/ulcer(s) was/were sharply debrided down through and including the removal of   
dermis and subcutaneous tissue Devitalized Tissue Debrided: slough Pre Debridement Measurements: 
Are located in the [place]  Documentation Flow Sheet Non-Pressure ulcer, fat layer exposed Wound/Ulcer #: 1 Post Debridement Measurements: 
Wound/Ulcer Descriptions are Pre Debridement except measurements: 
 
Wound Leg lower Left;Lateral #1 (Active) Wound Image   02/08/21 1414 Wound Etiology Venous 02/08/21 1414 Dressing Status Clean;Dry; Intact 02/08/21 1414 Cleansed Soap and water 02/08/21 1414 Dressing/Treatment Satya campos 10/26/20 1002 Wound Length (cm) 4.8 cm 02/08/21 1414 Wound Width (cm) 2.1 cm 02/08/21 1414 Wound Depth (cm) 0.2 cm 02/08/21 1414 Wound Surface Area (cm^2) 10.08 cm^2 02/08/21 1414 Change in Wound Size % 60.66 02/08/21 1414 Wound Volume (cm^3) 2.02 cm^3 02/08/21 1414 Wound Healing % 61 02/08/21 1414 Post-Procedure Length (cm) 4.8 cm 02/08/21 1426 Post-Procedure Width (cm) 2.1 cm 02/08/21 1426 Post-Procedure Depth (cm) 0.2 cm 02/08/21 1426 Post-Procedure Surface Area (cm^2) 10.08 cm^2 02/08/21 1426 Post-Procedure Volume (cm^3) 2.02 cm^3 02/08/21 1426 Wound Assessment Granulation tissue;Slough 02/08/21 1414 Drainage Amount Moderate 02/08/21 1414 Drainage Description Serosanguinous 02/08/21 1414 Wound Odor None 02/08/21 1414 Alissa-Wound/Incision Assessment Hyperpigmented 02/08/21 1414 Edges Defined edges 02/08/21 1414 Wound Thickness Description Full thickness 02/08/21 1414 Number of days: 147 Total Surface Area Debrided:  10 sq cm Estimated Blood Loss:  Minimal  
 
Hemostasis Achieved: Pressure Procedural Pain: 2 / 10 Post Procedural Pain: 0 / 10 Response to treatment: Well tolerated by patient

## 2021-02-15 ENCOUNTER — HOSPITAL ENCOUNTER (OUTPATIENT)
Dept: WOUND CARE | Age: 66
Discharge: HOME | End: 2021-02-15
Payer: MEDICARE

## 2021-02-15 VITALS — SYSTOLIC BLOOD PRESSURE: 122 MMHG | HEART RATE: 90 BPM | TEMPERATURE: 97.9 F | DIASTOLIC BLOOD PRESSURE: 61 MMHG

## 2021-02-15 DIAGNOSIS — R60.0 LOCALIZED EDEMA: ICD-10-CM

## 2021-02-15 DIAGNOSIS — I87.312 IDIOPATHIC CHRONIC VENOUS HYPERTENSION OF LEFT LOWER EXTREMITY WITH ULCER (HCC): ICD-10-CM

## 2021-02-15 DIAGNOSIS — L97.929 IDIOPATHIC CHRONIC VENOUS HYPERTENSION OF LEFT LOWER EXTREMITY WITH ULCER (HCC): ICD-10-CM

## 2021-02-15 PROCEDURE — 11042 DBRDMT SUBQ TIS 1ST 20SQCM/<: CPT

## 2021-02-15 RX ORDER — LIDOCAINE HYDROCHLORIDE 20 MG/ML
15 SOLUTION OROPHARYNGEAL AS NEEDED
Status: DISCONTINUED | OUTPATIENT
Start: 2021-02-15 | End: 2021-02-17 | Stop reason: HOSPADM

## 2021-02-15 NOTE — WOUND CARE
[employer] Below Knee NAME:  Rexene Holter YOB: 1955 MEDICAL RECORD NUMBER:  [id number] DATE:  2/15/2021 Remove old Unna Boot or Boots. Applied moisturizing agent to dry skin as needed. Appied primary and secondary dressing as ordered. Applied Unna roll from toes to knee overlapping each time. Applied ace wrap or coban from toes to below the knee. Secured with tape and/or metal clips covered with tape. Instructed patient/caregiver to keep dressing dry and intact. DO NOT REMOVE DRESSING. Instructed pt/family/caregiver to report excessive draining, loose bandage, wet dressing, severe pain or tingling in toes. Applied [place] dressing below the knee to left lower leg. Unna Boot(s) were applied per  Guidelines. Response to treatment: Well tolerated by patient  Electronically signed by Malgorzata Reich LPN on 5/76/0175 at 60:23 PM

## 2021-02-15 NOTE — DISCHARGE INSTRUCTIONS
[address] [facility], [address] [place] [address]  Telephone: [phone]    NAME:  Lisa Arnold OF BIRTH:  1955  MEDICAL RECORD NUMBER:  [id number]  DATE:  2/15/2021      Return Appointment:  [] Dressing supply provider:   [] Home Healthcare:    [x] Return Appointment: 1 Week(s)  [] Nurse Visit:  Week(s)    [] Discharge from [facility]  [] Ordered tests:   [] Referrals:   [] Rx:     Wound Cleansing:   Do not scrub or use excessive force. Cleanse wound prior to applying a clean dressing with:  [x] Normal Saline   [x] Mild Soap & Water    [] Keep Wound Dry in Shower  [] Other:      Topical Treatments:  Do not apply lotions, creams, or ointments to wound bed unless directed. [x] Apply moisturizing lotion to skin surrounding the wound prior to dressing change.  [] Apply antifungal ointment to skin surrounding the wound prior to dressing change.  [] Apply thin film of moisture barrier ointment to skin immediately around wound. [] Other:       Dressings:           Wound Location Left leg   [x] Apply Primary Dressing:       [] MediHoney Gel    [] MediHoney Alginate               [] Calcium Alginate      [] Calcium Alginate with Silver   [] Collagen                   [] Collagen with Silver                [] Santyl with Moistened saline gauze              [x] Hydrofera Blue (cut to size and moistened)  [] Hydrofera Blue Ready (Cut to size)   [] NS wet to dry    [] Betadine wet to dry              [] Hydrogel                [] Mepitel     [] Bactroban/Mupirocin               [x] Other: Drawtex    [x] Cover and Secure with:     [x] Gauze [] Chalice Qualia [] Kerlix   [] Foam [] Super Absorbant [] ABD     [] ACE Wrap            [] Other:    Avoid contact of tape with skin.     [x] Change dressing: [] Daily    [] Every Other Day [] Once per week   [] Twice per week   [] Three times per week [x] Do Not Change Dressing   [] Other:     Compression:  Apply: [x] Multilayer Compression Wrap: []RightLeg [x]Left Leg                                 []Profore  []Profore Lite             [x]Unna     [x] Do not get leg(s) with wrap wet. If wraps become too tight call the center or completely remove the wrap. [x] Elevate leg(s) above the level of the heart when sitting. [x] Avoid prolonged standing in one place. Dietary:  [x] Diet as tolerated: [] Calorie Diabetic Diet: [x] No Added Salt:  [] Increase Protein: [] Other:     Activity:  [x] Activity as tolerated:    [] Patient has no activity restrictions       [] Strict Bedrest:   [] Remain off Work:       [] May return to full duty work:                                     [] Return to work with restrictions:               [address] Information: Should you experience any significant changes in your wound(s) or have questions about your wound care, please contact Thais Caldera at [place] [address] 8:00 am - 4:30. If you need help with your wound outside of these hours and cannot wait until we are again available, contact your PCP or go to the hospital emergency room. PLEASE NOTE: IF YOU ARE UNABLE TO OBTAIN WOUND SUPPLIES, CONTINUE TO USE THE SUPPLIES YOU HAVE AVAILABLE UNTIL YOU ARE ABLE TO REACH US. IT IS MOST IMPORTANT TO KEEP THE WOUND COVERED AT ALL TIMES.     [x] Dr. Krystina Faye   [] Dr. Shreya Daniels  [] Nick [facility]  [] Dr. Yuni Eaton

## 2021-02-15 NOTE — WOUND CARE
[facility], [address] [place] [address] Telephone: [phone] NAME:  Dylan Denton YOB: 1955 MEDICAL RECORD NUMBER:  [id number] DATE:  2/15/2021 Return Appointment: 
[] Dressing supply provider:  
[] Home Healthcare:   
[x] Return Appointment: 1 Week(s) 
[] Nurse Visit:  Week(s) 
 
[] Discharge from [facility] [] Ordered tests:  
[] Referrals:  
[] Rx:  
 
Wound Cleansing: Do not scrub or use excessive force. Cleanse wound prior to applying a clean dressing with: 
[x] Normal Saline  
[x] Mild Soap & Water   
[] Keep Wound Dry in Shower 
[] Other:   
 
Topical Treatments: Do not apply lotions, creams, or ointments to wound bed unless directed. [x] Apply moisturizing lotion to skin surrounding the wound prior to dressing change. 
[] Apply antifungal ointment to skin surrounding the wound prior to dressing change. 
[] Apply thin film of moisture barrier ointment to skin immediately around wound. [] Other:   
  
Dressings:           Wound Location Left leg [x] Apply Primary Dressing:     
 [] MediHoney Gel    [] MediHoney Alginate  
            [] Calcium Alginate      [] Calcium Alginate with Silver 
 [] Collagen                   [] Collagen with Silver   
            [] Santyl with Moistened saline gauze [x] Hydrofera Blue (cut to size and moistened)  [] Hydrofera Blue Ready (Cut to size) [] NS wet to dry    [] Betadine wet to dry 
            [] Hydrogel                [] Mepitel   
 [] Bactroban/Mupirocin  
            [x] Other: Drawtex [x] Cover and Secure with:   
 [x] Gauze [] Cristian [] Kerlix [] Foam [] Super Absorbant [] ABD [] ACE Wrap            [] Other:  
 Avoid contact of tape with skin. [x] Change dressing: [] Daily    [] Every Other Day [] Once per week   [] Twice per week 
 [] Three times per week [x] Do Not Change Dressing   [] Other: 
  
Compression: Apply: [x] Multilayer Compression Wrap: []RightLeg [x]Left Leg 
                               []Profore  []Profore Lite             [x]Unna 
 
 [x] Do not get leg(s) with wrap wet.  If wraps become too tight call the center or completely remove the wrap.    
 [x] Elevate leg(s) above the level of the heart when sitting.  
 [x] Avoid prolonged standing in one place. 
 
Dietary: 
[x] Diet as tolerated: [] Calorie Diabetic Diet: [x] No Added Salt: 
[] Increase Protein: [] Other:  
 
Activity: 
[x] Activity as tolerated:   
[] Patient has no activity restrictions      
[] Strict Bedrest:  
[] Remain off Work:      
[] May return to full duty work:                                    
[] Return to work with restrictions:  
         
 
Wound Care Center Information: Should you experience any significant changes in your wound(s) or have questions about your wound care, please contact [facility] at [phone] MONDAY - FRIDAY 8:00 am - 4:30.  If you need help with your wound outside of these hours and cannot wait until we are again available, contact your PCP or go to the hospital emergency room.  
 
PLEASE NOTE: IF YOU ARE UNABLE TO OBTAIN WOUND SUPPLIES, CONTINUE TO USE THE SUPPLIES YOU HAVE AVAILABLE UNTIL YOU ARE ABLE TO REACH US. IT IS MOST IMPORTANT TO KEEP THE WOUND COVERED AT ALL TIMES. 
 
[x] Dr. Mateusz Dhillon  
[] Dr. Sima Henning 
[] Lianet HERNANDEZ 
[] Dr. Yolis Mendez

## 2021-02-15 NOTE — WOUND CARE
[address] Progress Note and Procedure Note Bigg Daniels MEDICAL RECORD NUMBER:  [id number] AGE: 76 y.o. RACE WHITE OR   GENDER: male  : 1955 EPISODE DATE:  2/15/2021 Subjective: No new problems reported Chief Complaint Patient presents with  Wound Check  
  left lateral leg HISTORY of PRESENT ILLNESS HPI Bigg Daniels is a 76 y.o. male who presents today for wound/ulcer evaluation. History of Wound Context: L  leg Wound/Ulcer Pain Timing/Severity: none Quality of pain: dull Severity:  0 / 10 Modifying Factors: Pain is relieved/improved with rest 
Associated Signs/Symptoms: edema Ulcer Identification: 
Ulcer Type: venous Contributing Factors: lymphedema Wound: L leg PAST MEDICAL HISTORY Past Medical History:  
Diagnosis Date  Arthritis  Chronic obstructive pulmonary disease ([facility] [address]  Diabetes ([facility] [address]  Gout  Hypertension  Sleep apnea PAST SURGICAL HISTORY Past Surgical History:  
Procedure Laterality Date  HX ORTHOPAEDIC    
 HX VEIN ABLATION ADHESIVE    
 
 
FAMILY HISTORY Family History Problem Relation Age of Onset  Heart Disease Mother  Kidney Disease Mother  Hypertension Mother  Diabetes Mother  Heart Disease Father  Hypertension Father  Diabetes Sister  Diabetes Brother SOCIAL HISTORY Social History Tobacco Use  Smoking status: Never Smoker  Smokeless tobacco: Never Used Substance Use Topics  Alcohol use: Yes  Drug use: Never ALLERGIES Allergies Allergen Reactions  Elavil Unknown (comments)  Sulfa (Sulfonamide Antibiotics) Nausea and Vomiting MEDICATIONS Current Outpatient Medications on File Prior to Encounter Medication Sig Dispense Refill  multivits,Stress Formula-Zinc tablet Take 1 Tab by mouth daily.  vitamin e (E GEMS) 1,000 unit capsule Take 1,000 Units by mouth two (2) times a day.  multivitamin (ONE A DAY) tablet Take 1 Tab by mouth daily.  ferrous sulfate (Iron) 325 mg (65 mg iron) tablet Take  by mouth Daily (before breakfast).  aspirin 81 mg chewable tablet Take 81 mg by mouth daily.  flunisolide (NASAREL) 25 mcg (0.025 %) spry 2 Sprays two (2) times a day.  albuterol (ProAir HFA) 90 mcg/actuation inhaler Take  by inhalation.  colchicine 0.6 mg tablet Take 0.6 mg by mouth daily.  gabapentin (NEURONTIN) 300 mg capsule Take 300 mg by mouth four (4) times daily.  oxyCODONE IR (ROXICODONE) 5 mg immediate release tablet Take 5 mg by mouth every twelve (12) hours.  furosemide (Lasix) 40 mg tablet Take 40 mg by mouth daily.  amLODIPine (NORVASC) 5 mg tablet Take 5 mg by mouth daily.  metFORMIN (GLUCOPHAGE) 500 mg tablet Take 1,000 mg by mouth two (2) times daily (with meals).  hydroCHLOROthiazide (HYDRODIURIL) 25 mg tablet Take 25 mg by mouth daily.  insulin lispro (HumaLOG U-100 Insulin) 100 unit/mL injection by SubCUTAneous route. Sliding scale  insulin glargine (LANTUS) 100 unit/mL injection by SubCUTAneous route nightly. 80units am/ 20units pm    
 
No current facility-administered medications on file prior to encounter. REVIEW OF SYSTEMS Pertinent items are noted in HPI. Objective:  
 
Visit Vitals /61 (BP Patient Position: At rest;Sitting) Pulse 90 Temp 97.9 °F (36.6 °C) Wt Readings from Last 3 Encounters:  
11/23/20 (!) 166.5 kg (367 lb) PHYSICAL EXAM 
L leg wound measures slightly smaller, slough debrided, no infection Pertinent items are noted in HPI. Assessment:  
improved Problem List Items Addressed This Visit Idiopathic chronic venous hypertension of left lower extremity with ulcer ([address] Localized edema POP IN PROCEDURE TYPE (DEBRIDEMENT, BIOPSY, I&D, SKIN SUB, CHEMICAL CAUERTY) Plan:  
Continue hydrofera blue + Unna boot Treatment Note please see attached Discharge Instructions Written patient dismissal instructions given to patient or POA. Electronically signed by Sahil Ramires MD on 2/15/2021 at 11:43 AM 
 
 
 
 
 
 
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 Performed by: Sahil Ramires MD 
 
Consent obtained: Yes Time out taken: Yes Debridement: Excisional 
 
Using curette the wound(s)/ulcer(s) was/were sharply debrided down through and including the removal of   
dermis Devitalized Tissue Debrided: slough Pre Debridement Measurements: 
Are located in the [place]  Documentation Flow Sheet Non-Pressure ulcer, fat layer exposed Wound/Ulcer #: 1 Post Debridement Measurements: 
Wound/Ulcer Descriptions are Pre Debridement except measurements: 
 
Wound Leg lower Left;Lateral #1 (Active) Wound Image   02/15/21 1124 Wound Etiology Venous 02/15/21 1124 Dressing Status Clean;Dry; Intact 02/15/21 1124 Cleansed Soap and water 02/15/21 1124 Dressing/Treatment Fuad Negron boot 10/26/20 1002 Wound Length (cm) 4 cm 02/15/21 1124 Wound Width (cm) 2.3 cm 02/15/21 1124 Wound Depth (cm) 0.2 cm 02/15/21 1124 Wound Surface Area (cm^2) 9.2 cm^2 02/15/21 1124 Change in Wound Size % 64.09 02/15/21 1124 Wound Volume (cm^3) 1.84 cm^3 02/15/21 1124 Wound Healing % 64 02/15/21 1124 Post-Procedure Length (cm) 4 cm 02/15/21 1140 Post-Procedure Width (cm) 2.3 cm 02/15/21 1140 Post-Procedure Depth (cm) 0.2 cm 02/15/21 1140 Post-Procedure Surface Area (cm^2) 9.2 cm^2 02/15/21 1140 Post-Procedure Volume (cm^3) 1.84 cm^3 02/15/21 1140 Wound Assessment Granulation tissue;Slough 02/15/21 1124 Drainage Amount Moderate 02/15/21 1124 Drainage Description Serosanguinous 02/15/21 1124 Wound Odor None 02/15/21 1124 Alissa-Wound/Incision Assessment Hyperpigmented 02/15/21 1124 Edges Defined edges 02/15/21 1124 Wound Thickness Description Full thickness 02/15/21 1124 Number of days: 154 Total Surface Area Debrided:  9 sq cm Estimated Blood Loss:  Minimal  
 
Hemostasis Achieved: Pressure Procedural Pain: 0 / 10 Post Procedural Pain: 0 / 10 Response to treatment: Well tolerated by patient

## 2021-02-22 ENCOUNTER — HOSPITAL ENCOUNTER (OUTPATIENT)
Dept: WOUND CARE | Age: 66
Discharge: HOME | End: 2021-02-22
Admitting: SPECIALIST
Payer: MEDICARE

## 2021-02-22 VITALS
RESPIRATION RATE: 18 BRPM | HEART RATE: 78 BPM | DIASTOLIC BLOOD PRESSURE: 75 MMHG | SYSTOLIC BLOOD PRESSURE: 130 MMHG | TEMPERATURE: 98.1 F

## 2021-02-22 DIAGNOSIS — L97.929 IDIOPATHIC CHRONIC VENOUS HYPERTENSION OF LEFT LOWER EXTREMITY WITH ULCER (HCC): ICD-10-CM

## 2021-02-22 DIAGNOSIS — I87.312 IDIOPATHIC CHRONIC VENOUS HYPERTENSION OF LEFT LOWER EXTREMITY WITH ULCER (HCC): ICD-10-CM

## 2021-02-22 DIAGNOSIS — R60.0 LOCALIZED EDEMA: ICD-10-CM

## 2021-02-22 PROCEDURE — 11042 DBRDMT SUBQ TIS 1ST 20SQCM/<: CPT

## 2021-02-22 RX ORDER — LIDOCAINE HYDROCHLORIDE 20 MG/ML
15 SOLUTION OROPHARYNGEAL AS NEEDED
OUTPATIENT
Start: 2021-02-22

## 2021-02-22 NOTE — WOUND CARE
[facility] [address] [facility], [address] [place] [address] Telephone: [phone] NAME:  Cintia Carrillo YOB: 1955 MEDICAL RECORD NUMBER:  [id number] DATE:  2/22/2021 Return Appointment: 
[] Dressing supply provider:  
[] Home Healthcare:   
[x] Return Appointment: 1 Week(s) 
[] Nurse Visit:  Week(s) 
 
[] Discharge from [facility] [] Ordered tests:  
[] Referrals:  
[] Rx:  
 
Wound Cleansing: Do not scrub or use excessive force. Cleanse wound prior to applying a clean dressing with: 
[x] Normal Saline  
[x] Mild Soap & Water   
[] Keep Wound Dry in Shower 
[] Other:   
 
Topical Treatments: Do not apply lotions, creams, or ointments to wound bed unless directed. [x] Apply moisturizing lotion to skin surrounding the wound prior to dressing change. 
[] Apply antifungal ointment to skin surrounding the wound prior to dressing change. 
[] Apply thin film of moisture barrier ointment to skin immediately around wound. [] Other:   
  
Dressings:           Wound Location Left leg [x] Apply Primary Dressing:     
 [] MediHoney Gel    [] MediHoney Alginate  
            [] Calcium Alginate      [] Calcium Alginate with Silver 
 [] Collagen                   [x] Collagen with Silver   
            [] Santyl with Moistened saline gauze [x] Hydrofera Blue (cut to size and moistened)  [] Hydrofera Blue Ready (Cut to size) [] NS wet to dry    [] Betadine wet to dry 
            [] Hydrogel                [] Mepitel   
 [] Bactroban/Mupirocin  
            [x] Other: Drawtex [x] Cover and Secure with:   
 [x] Gauze [] Cristian [] Kerlix [] Foam [] Super Absorbant [] ABD [] ACE Wrap            [] Other:  
 Avoid contact of tape with skin. [x] Change dressing: [] Daily    [] Every Other Day [] Once per week   [] Twice per week 
 [] Three times per week [x] Do Not Change Dressing   [] Other: 
  
Compression: Apply: [x] Multilayer Compression Wrap: []RightLeg [x]Left Leg 
                               []Profore  []Profore Lite             [x]Unna [x] Do not get leg(s) with wrap wet. If wraps become too tight call the center or completely remove the wrap. [x] Elevate leg(s) above the level of the heart when sitting. [x] Avoid prolonged standing in one place. Dietary: 
[x] Diet as tolerated: [] Calorie Diabetic Diet: [x] No Added Salt: 
[] Increase Protein: [] Other:  
 
Activity: 
[x] Activity as tolerated:   
[] Patient has no activity restrictions [] Strict Bedrest:  
[] Remain off Work:      
[] May return to full duty work:                                    
[] Return to work with restrictions:  
         
 
[address] Information: Should you experience any significant changes in your wound(s) or have questions about your wound care, please contact Thais Caldera at [place] [address] 8:00 am - 4:30. If you need help with your wound outside of these hours and cannot wait until we are again available, contact your PCP or go to the hospital emergency room. PLEASE NOTE: IF YOU ARE UNABLE TO OBTAIN WOUND SUPPLIES, CONTINUE TO USE THE SUPPLIES YOU HAVE AVAILABLE UNTIL YOU ARE ABLE TO REACH US. IT IS MOST IMPORTANT TO KEEP THE WOUND COVERED AT ALL TIMES. [x] Dr. Serena Crawford  
[] Dr. Denise Bowser 
[] [facility] [] Dr. Angella Ng

## 2021-02-22 NOTE — DISCHARGE INSTRUCTIONS
[address] [place] [address]. [address], [address] [facility]  Telephone: [phone]    NAME:  Ragini Shaw OF BIRTH:  1955  MEDICAL RECORD NUMBER:  [id number]  DATE:  2/22/2021      Return Appointment:  [] Dressing supply provider:   [] Home Healthcare:    [x] Return Appointment: 1 Week(s)  [] Nurse Visit:  Week(s)    [] Discharge from [facility]  [] Ordered tests:   [] Referrals:   [] Rx:     Wound Cleansing:   Do not scrub or use excessive force. Cleanse wound prior to applying a clean dressing with:  [x] Normal Saline   [x] Mild Soap & Water    [] Keep Wound Dry in Shower  [] Other:      Topical Treatments:  Do not apply lotions, creams, or ointments to wound bed unless directed. [x] Apply moisturizing lotion to skin surrounding the wound prior to dressing change.  [] Apply antifungal ointment to skin surrounding the wound prior to dressing change.  [] Apply thin film of moisture barrier ointment to skin immediately around wound. [] Other:       Dressings:           Wound Location Left leg   [x] Apply Primary Dressing:       [] MediHoney Gel    [] MediHoney Alginate               [] Calcium Alginate      [] Calcium Alginate with Silver   [] Collagen                   [x] Collagen with Silver                [] Santyl with Moistened saline gauze              [x] Hydrofera Blue (cut to size and moistened)  [] Hydrofera Blue Ready (Cut to size)   [] NS wet to dry    [] Betadine wet to dry              [] Hydrogel                [] Mepitel     [] Bactroban/Mupirocin               [x] Other: Drawtex    [x] Cover and Secure with:     [x] Gauze [] Zula Pages [] Kerlix   [] Foam [] Super Absorbant [] ABD     [] ACE Wrap            [] Other:    Avoid contact of tape with skin.     [x] Change dressing: [] Daily    [] Every Other Day [] Once per week   [] Twice per week   [] Three times per week [x] Do Not Change Dressing   [] Other:     Compression:  Apply: [x] Multilayer Compression Wrap: []RightLeg [x]Left Leg                                 []Profore  []Profore Lite             [x]Unna     [x] Do not get leg(s) with wrap wet. If wraps become too tight call the center or completely remove the wrap. [x] Elevate leg(s) above the level of the heart when sitting. [x] Avoid prolonged standing in one place. Dietary:  [x] Diet as tolerated: [] Calorie Diabetic Diet: [x] No Added Salt:  [] Increase Protein: [] Other:     Activity:  [x] Activity as tolerated:    [] Patient has no activity restrictions       [] Strict Bedrest:   [] Remain off Work:       [] May return to full duty work:                                     [] Return to work with restrictions:               [address] Information: Should you experience any significant changes in your wound(s) or have questions about your wound care, please contact Thais Caldera at [place] [address] 8:00 am - 4:30. If you need help with your wound outside of these hours and cannot wait until we are again available, contact your PCP or go to the hospital emergency room. PLEASE NOTE: IF YOU ARE UNABLE TO OBTAIN WOUND SUPPLIES, CONTINUE TO USE THE SUPPLIES YOU HAVE AVAILABLE UNTIL YOU ARE ABLE TO REACH US. IT IS MOST IMPORTANT TO KEEP THE WOUND COVERED AT ALL TIMES.     [x] Dr. Bang Lynch   [] Dr. Beth Berg  [] [facility]  [] Dr. Kelechi Ly

## 2021-02-22 NOTE — WOUND CARE
[address] Progress Note and Procedure Note Marina Vaughn MEDICAL RECORD NUMBER:  [id number] AGE: 76 y.o. RACE WHITE  GENDER: male  : 1955 EPISODE DATE:  2021 Subjective: No new problems Chief Complaint Patient presents with  Wound Check  
  left leg HISTORY of PRESENT ILLNESS HPI Marina Vaughn is a 76 y.o. male who presents today for wound/ulcer evaluation. History of Wound Context: L leg Wound/Ulcer Pain Timing/Severity: none Quality of pain: dull Severity:  0 / 10 Modifying Factors: None Associated Signs/Symptoms: edema Ulcer Identification: 
Ulcer Type: venous Contributing Factors: lymphedema and diabetes Wound: L leg PAST MEDICAL HISTORY Past Medical History:  
Diagnosis Date  Arthritis  Chronic obstructive pulmonary disease ([facility]  Diabetes ([facility]  Gout  Hypertension  Sleep apnea PAST SURGICAL HISTORY Past Surgical History:  
Procedure Laterality Date  HX ORTHOPAEDIC    
 HX VEIN ABLATION ADHESIVE    
 
 
FAMILY HISTORY Family History Problem Relation Age of Onset  Heart Disease Mother  Kidney Disease Mother  Hypertension Mother  Diabetes Mother  Heart Disease Father  Hypertension Father  Diabetes Sister  Diabetes Brother SOCIAL HISTORY Social History Tobacco Use  Smoking status: Never Smoker  Smokeless tobacco: Never Used Substance Use Topics  Alcohol use: Yes  Drug use: Never ALLERGIES Allergies Allergen Reactions  Elavil Unknown (comments)  Sulfa (Sulfonamide Antibiotics) Nausea and Vomiting MEDICATIONS Current Outpatient Medications on File Prior to Encounter Medication Sig Dispense Refill  multivits,Stress Formula-Zinc tablet Take 1 Tab by mouth daily.  vitamin e (E GEMS) 1,000 unit capsule Take 1,000 Units by mouth two (2) times a day.  multivitamin (ONE A DAY) tablet Take 1 Tab by mouth daily.  ferrous sulfate (Iron) 325 mg (65 mg iron) tablet Take  by mouth Daily (before breakfast).  aspirin 81 mg chewable tablet Take 81 mg by mouth daily.  flunisolide (NASAREL) 25 mcg (0.025 %) spry 2 Sprays two (2) times a day.  albuterol (ProAir HFA) 90 mcg/actuation inhaler Take  by inhalation.  colchicine 0.6 mg tablet Take 0.6 mg by mouth daily.  gabapentin (NEURONTIN) 300 mg capsule Take 300 mg by mouth four (4) times daily.  oxyCODONE IR (ROXICODONE) 5 mg immediate release tablet Take 5 mg by mouth every twelve (12) hours.  furosemide (Lasix) 40 mg tablet Take 40 mg by mouth daily.  amLODIPine (NORVASC) 5 mg tablet Take 5 mg by mouth daily.  metFORMIN (GLUCOPHAGE) 500 mg tablet Take 1,000 mg by mouth two (2) times daily (with meals).  hydroCHLOROthiazide (HYDRODIURIL) 25 mg tablet Take 25 mg by mouth daily.  insulin lispro (HumaLOG U-100 Insulin) 100 unit/mL injection by SubCUTAneous route. Sliding scale  insulin glargine (LANTUS) 100 unit/mL injection by SubCUTAneous route nightly. 80units am/ 20units pm    
 
No current facility-administered medications on file prior to encounter. REVIEW OF SYSTEMS Pertinent items are noted in HPI. Objective:  
 
Visit Vitals /75 (BP 1 Location: Right arm, BP Patient Position: Sitting) Pulse 78 Temp 98.1 °F (36.7 °C) Resp 18 Wt Readings from Last 3 Encounters:  
11/23/20 (!) 166.5 kg (367 lb) PHYSICAL EXAM 
L leg wound measures slightly smaller, no infection Pertinent items are noted in HPI. Assessment:  
Slow progress Problem List Items Addressed This Visit Idiopathic chronic venous hypertension of left lower extremity with ulcer ([address] Localized edema POP IN PROCEDURE TYPE (DEBRIDEMENT, BIOPSY, I&D, SKIN SUB, CHEMICAL CAUERTY) Plan:  
Add Nancy [facility] to [facility] Treatment Note please see attached Discharge Instructions Written patient dismissal instructions given to patient or POA. Electronically signed by Maira Espino MD on 2/22/2021 at 11:19 AM 
 
 
 
 
 
 
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 Performed by: Maira Espino MD 
 
Consent obtained: Yes Time out taken: Yes Debridement: Excisional 
 
Using curette the wound(s)/ulcer(s) was/were sharply debrided down through and including the removal of   
subcutaneous tissue Devitalized Tissue Debrided: slough Pre Debridement Measurements: 
Are located in the [place]  Documentation Flow Sheet Non-Pressure ulcer, fat layer exposed Wound/Ulcer #: 1 Post Debridement Measurements: 
Wound/Ulcer Descriptions are Pre Debridement except measurements: 
 
Wound Leg lower Left;Lateral #1 (Active) Wound Image   02/15/21 1124 Wound Etiology Venous 02/22/21 1103 Dressing Status Clean;Dry; Intact 02/22/21 1103 Cleansed Soap and water 02/22/21 1103 Dressing/Treatment Bonnyman Zach boot 10/26/20 1002 Wound Length (cm) 3.9 cm 02/22/21 1103 Wound Width (cm) 2 cm 02/22/21 1103 Wound Depth (cm) 0.2 cm 02/22/21 1103 Wound Surface Area (cm^2) 7.8 cm^2 02/22/21 1103 Change in Wound Size % 69.56 02/22/21 1103 Wound Volume (cm^3) 1.56 cm^3 02/22/21 1103 Wound Healing % 70 02/22/21 1103 Post-Procedure Length (cm) 3.9 cm 02/22/21 1116 Post-Procedure Width (cm) 2 cm 02/22/21 1116 Post-Procedure Depth (cm) 0.2 cm 02/22/21 1116 Post-Procedure Surface Area (cm^2) 7.8 cm^2 02/22/21 1116 Post-Procedure Volume (cm^3) 1.56 cm^3 02/22/21 1116 Wound Assessment Granulation tissue;Slough 02/15/21 1124 Drainage Amount Moderate 02/22/21 1103 Drainage Description Serosanguinous 02/22/21 1103 Wound Odor None 02/22/21 1103 Alissa-Wound/Incision Assessment Maceration 02/22/21 1103 Edges Epibole (rolled edges) 02/22/21 1103 Wound Thickness Description Full thickness 02/22/21 1103 Number of days: 161 Total Surface Area Debrided:  7 sq cm Estimated Blood Loss:  Minimal  
 
Hemostasis Achieved: Pressure Procedural Pain: 0 / 10 Post Procedural Pain: 0 / 10 Response to treatment: Well tolerated by patient

## 2021-02-22 NOTE — WOUND CARE
[employer] Below Knee NAME:  Gabriel Frank YOB: 1955 MEDICAL RECORD NUMBER:  [id number] DATE:  2/22/2021 Remove old Unna Boot or Boots. Applied moisturizing agent to dry skin as needed. Appied primary and secondary dressing as ordered. Applied Unna roll from toes to knee overlapping each time. Applied ace wrap or coban from toes to below the knee. Secured with tape and/or metal clips covered with tape. Instructed patient/caregiver to keep dressing dry and intact. DO NOT REMOVE DRESSING. Instructed pt/family/caregiver to report excessive draining, loose bandage, wet dressing, severe pain or tingling in toes. Applied [place] dressing below the knee to left lower leg. Unna Boot(s) were applied per  Guidelines. Response to treatment: Well tolerated by patient  Electronically signed by Antonia Dodd LPN on 8/17/8676 at 62:19 AM

## 2021-03-01 ENCOUNTER — HOSPITAL ENCOUNTER (OUTPATIENT)
Dept: WOUND CARE | Age: 66
Discharge: HOME | End: 2021-03-01
Payer: MEDICARE

## 2021-03-01 VITALS
TEMPERATURE: 97.9 F | SYSTOLIC BLOOD PRESSURE: 141 MMHG | RESPIRATION RATE: 18 BRPM | DIASTOLIC BLOOD PRESSURE: 78 MMHG | OXYGEN SATURATION: 86 %

## 2021-03-01 DIAGNOSIS — R60.0 LOCALIZED EDEMA: ICD-10-CM

## 2021-03-01 DIAGNOSIS — L97.929 IDIOPATHIC CHRONIC VENOUS HYPERTENSION OF LEFT LOWER EXTREMITY WITH ULCER (HCC): ICD-10-CM

## 2021-03-01 DIAGNOSIS — I87.312 IDIOPATHIC CHRONIC VENOUS HYPERTENSION OF LEFT LOWER EXTREMITY WITH ULCER (HCC): ICD-10-CM

## 2021-03-01 PROCEDURE — 11042 DBRDMT SUBQ TIS 1ST 20SQCM/<: CPT

## 2021-03-01 RX ORDER — LIDOCAINE HYDROCHLORIDE 20 MG/ML
15 SOLUTION OROPHARYNGEAL AS NEEDED
Status: DISCONTINUED | OUTPATIENT
Start: 2021-03-01 | End: 2021-03-03 | Stop reason: HOSPADM

## 2021-03-01 NOTE — WOUND CARE
[employer] Below Knee NAME:  Kwadwo Romero YOB: 1955 MEDICAL RECORD NUMBER:  [id number] DATE:  3/1/2021 Remove old Unna Boot or Boots. Applied moisturizing agent to dry skin as needed. Appied primary and secondary dressing as ordered. Applied Unna roll from toes to knee overlapping each time. Applied ace wrap or coban from toes to below the knee. Secured with tape and/or metal clips covered with tape. Instructed patient/caregiver to keep dressing dry and intact. DO NOT REMOVE DRESSING. Instructed pt/family/caregiver to report excessive draining, loose bandage, wet dressing, severe pain or tingling in toes. Applied [place] dressing below the knee to left lower leg. Unna Boot(s) were applied per  Guidelines. Response to treatment: Well tolerated by patient  Electronically signed by Artie Ndiaye RN on 3/1/2021 at 3:11 PM

## 2021-03-01 NOTE — WOUND CARE
[address] [facility] [address], [address] [facility] [place] Telephone: [phone] NAME:  Purnima Andrews YOB: 1955 MEDICAL RECORD NUMBER:  [id number] DATE:  3/1/2021 Return Appointment: 
[] Dressing supply provider:  
[] Home Healthcare:   
[x] Return Appointment: 1 Week(s) 
[] Nurse Visit:  Week(s) 
 
[] Discharge from [facility] [] Ordered tests:  
[] Referrals:  
[] Rx:  
 
Wound Cleansing: Do not scrub or use excessive force. Cleanse wound prior to applying a clean dressing with: 
[x] Normal Saline  
[x] Mild Soap & Water   
[] Keep Wound Dry in Shower 
[] Other:   
 
Topical Treatments: Do not apply lotions, creams, or ointments to wound bed unless directed. [x] Apply moisturizing lotion to skin surrounding the wound prior to dressing change. 
[] Apply antifungal ointment to skin surrounding the wound prior to dressing change. 
[] Apply thin film of moisture barrier ointment to skin immediately around wound. [] Other:   
  
Dressings:           Wound Location Left leg [x] Apply Primary Dressing:     
 [] MediHoney Gel    [] MediHoney Alginate  
            [] Calcium Alginate      [] Calcium Alginate with Silver 
 [] Collagen                   [x] Collagen with Silver   
            [] Santyl with Moistened saline gauze [x] Hydrofera Blue (cut to size and moistened)  [] Hydrofera Blue Ready (Cut to size) [] NS wet to dry    [] Betadine wet to dry 
            [] Hydrogel                [] Mepitel   
 [] Bactroban/Mupirocin  
            [x] Other: Drawtex [x] Cover and Secure with:   
 [x] Gauze [] Cristian [] Kerlix [] Foam [] Super Absorbant [] ABD [] ACE Wrap            [] Other:  
 Avoid contact of tape with skin. [x] Change dressing: [] Daily    [] Every Other Day [] Once per week   [] Twice per week 
 [] Three times per week [x] Do Not Change Dressing   [] Other: 
  
Compression: Apply: [x] Multilayer Compression Wrap: []RightLeg [x]Left Leg 
                               []Profore  []Profore Lite             [x]Unna [x] Do not get leg(s) with wrap wet. If wraps become too tight call the center or completely remove the wrap. [x] Elevate leg(s) above the level of the heart when sitting. [x] Avoid prolonged standing in one place. Dietary: 
[x] Diet as tolerated: [] Calorie Diabetic Diet: [x] No Added Salt: 
[] Increase Protein: [] Other:  
 
Activity: 
[x] Activity as tolerated:   
[] Patient has no activity restrictions [] Strict Bedrest:  
[] Remain off Work:      
[] May return to full duty work:                                    
[] Return to work with restrictions:  
         
 
[address] Information: Should you experience any significant changes in your wound(s) or have questions about your wound care, please contact Thais Caldera at [place] [address] 8:00 am - 4:30. If you need help with your wound outside of these hours and cannot wait until we are again available, contact your PCP or go to the hospital emergency room. PLEASE NOTE: IF YOU ARE UNABLE TO OBTAIN WOUND SUPPLIES, CONTINUE TO USE THE SUPPLIES YOU HAVE AVAILABLE UNTIL YOU ARE ABLE TO REACH US. IT IS MOST IMPORTANT TO KEEP THE WOUND COVERED AT ALL TIMES. [x] Dr. Ester Antunez  
[] Dr. Marcel Del Cid 
[] [facility] [] Dr. Brittany Anderson

## 2021-03-01 NOTE — DISCHARGE INSTRUCTIONS
[facility] [place] [address]  Telephone: [phone]     FAX [phone]    NAME:  Anthony Monae  YOB: 1955  MEDICAL RECORD NUMBER:  [id number]  DATE:  3/1/2021      Return Appointment:  [] Dressing supply provider:   [] Home Healthcare:    [x] Return Appointment: 1 Week(s)  [] Nurse Visit:  Week(s)    [] Discharge from [facility]  [] Ordered tests:   [] Referrals:   [] Rx:     Wound Cleansing:   Do not scrub or use excessive force.  Cleanse wound prior to applying a clean dressing with:  [x] Normal Saline   [x] Mild Soap & Water    [] Keep Wound Dry in Shower  [] Other:      Topical Treatments:  Do not apply lotions, creams, or ointments to wound bed unless directed.   [x] Apply moisturizing lotion to skin surrounding the wound prior to dressing change.  [] Apply antifungal ointment to skin surrounding the wound prior to dressing change.  [] Apply thin film of moisture barrier ointment to skin immediately around wound.  [] Other:       Dressings:           Wound Location Left leg   [x] Apply Primary Dressing:       [] MediHoney Gel    [] MediHoney Alginate               [] Calcium Alginate      [] Calcium Alginate with Silver   [] Collagen                   [x] Collagen with Silver                [] Santyl with Moistened saline gauze              [x] Hydrofera Blue (cut to size and moistened)  [] Hydrofera Blue Ready (Cut to size)   [] NS wet to dry    [] Betadine wet to dry              [] Hydrogel                [] Mepitel     [] Bactroban/Mupirocin               [] Other: Drawtex    [x] Cover and Secure with:     [x] Gauze [] Cristian [] Kerlix   [] Foam [] Super Absorbant [] ABD     [] ACE Wrap            [] Other:    Avoid contact of tape with skin.    [x] Change dressing: [] Daily    [] Every Other Day [] Once per week   [] Twice per week   [] Three times per week [x] Do Not Change Dressing   [] Other:     Compression:  Apply: [x]  Multilayer Compression Wrap: []RightLeg [x]Left Leg                                 []Profore  []Profore Lite             [x]Unna     [x] Do not get leg(s) with wrap wet. If wraps become too tight call the center or completely remove the wrap. [x] Elevate leg(s) above the level of the heart when sitting. [x] Avoid prolonged standing in one place. Dietary:  [x] Diet as tolerated: [] Calorie Diabetic Diet: [x] No Added Salt:  [] Increase Protein: [] Other:     Activity:  [x] Activity as tolerated:    [] Patient has no activity restrictions       [] Strict Bedrest:   [] Remain off Work:       [] May return to full duty work:                                     [] Return to work with restrictions:               [address] Information: Should you experience any significant changes in your wound(s) or have questions about your wound care, please contact Thais Caldera at [place] [address] 8:00 am - 4:30. If you need help with your wound outside of these hours and cannot wait until we are again available, contact your PCP or go to the hospital emergency room. PLEASE NOTE: IF YOU ARE UNABLE TO OBTAIN WOUND SUPPLIES, CONTINUE TO USE THE SUPPLIES YOU HAVE AVAILABLE UNTIL YOU ARE ABLE TO REACH US. IT IS MOST IMPORTANT TO KEEP THE WOUND COVERED AT ALL TIMES.     [x] Dr. Beth Thayer   [] Dr. Donal Dallas  [] [facility]  [] Dr. Alfred Ng

## 2021-03-01 NOTE — WOUND CARE
[address] Progress Note and Procedure Note Erma Green MEDICAL RECORD NUMBER:  [id number] AGE: 76 y.o. RACE WHITE  GENDER: male  : 1955 EPISODE DATE:  3/1/2021 Subjective: No new problems Chief Complaint Patient presents with  Wound Check  
  left lower leg HISTORY of PRESENT ILLNESS HPI Erma Green is a 76 y.o. male who presents today for wound/ulcer evaluation. History of Wound Context: LLE Wound/Ulcer Pain Timing/Severity: mild Quality of pain: dull Severity:  1 / 10 Modifying Factors: Pain is relieved/improved with rest 
Associated Signs/Symptoms: edema Ulcer Identification: 
Ulcer Type: lymphedema Contributing Factors: obesity Wound: LLE  
     
PAST MEDICAL HISTORY Past Medical History:  
Diagnosis Date  Arthritis  Chronic obstructive pulmonary disease ([facility]  Diabetes ([facility]  Gout  Hypertension  Sleep apnea PAST SURGICAL HISTORY Past Surgical History:  
Procedure Laterality Date  HX ORTHOPAEDIC    
 HX VEIN ABLATION ADHESIVE    
 
 
FAMILY HISTORY Family History Problem Relation Age of Onset  Heart Disease Mother  Kidney Disease Mother  Hypertension Mother  Diabetes Mother  Heart Disease Father  Hypertension Father  Diabetes Sister  Diabetes Brother SOCIAL HISTORY Social History Tobacco Use  Smoking status: Never Smoker  Smokeless tobacco: Never Used Substance Use Topics  Alcohol use: Yes  Drug use: Never ALLERGIES Allergies Allergen Reactions  Elavil Unknown (comments)  Sulfa (Sulfonamide Antibiotics) Nausea and Vomiting MEDICATIONS Current Outpatient Medications on File Prior to Encounter Medication Sig Dispense Refill  multivits,Stress Formula-Zinc tablet Take 1 Tab by mouth daily.  vitamin e (E GEMS) 1,000 unit capsule Take 1,000 Units by mouth two (2) times a day.  multivitamin (ONE A DAY) tablet Take 1 Tab by mouth daily.  ferrous sulfate (Iron) 325 mg (65 mg iron) tablet Take  by mouth Daily (before breakfast).  aspirin 81 mg chewable tablet Take 81 mg by mouth daily.  flunisolide (NASAREL) 25 mcg (0.025 %) spry 2 Sprays two (2) times a day.  albuterol (ProAir HFA) 90 mcg/actuation inhaler Take  by inhalation.  colchicine 0.6 mg tablet Take 0.6 mg by mouth daily.  gabapentin (NEURONTIN) 300 mg capsule Take 300 mg by mouth four (4) times daily.  oxyCODONE IR (ROXICODONE) 5 mg immediate release tablet Take 5 mg by mouth every twelve (12) hours.  furosemide (Lasix) 40 mg tablet Take 40 mg by mouth daily.  amLODIPine (NORVASC) 5 mg tablet Take 5 mg by mouth daily.  metFORMIN (GLUCOPHAGE) 500 mg tablet Take 1,000 mg by mouth two (2) times daily (with meals).  hydroCHLOROthiazide (HYDRODIURIL) 25 mg tablet Take 25 mg by mouth daily.  insulin lispro (HumaLOG U-100 Insulin) 100 unit/mL injection by SubCUTAneous route. Sliding scale  insulin glargine (LANTUS) 100 unit/mL injection by SubCUTAneous route nightly. 80units am/ 20units pm    
 
No current facility-administered medications on file prior to encounter. REVIEW OF SYSTEMS Pertinent items are noted in HPI. Objective:  
 
Visit Vitals BP (!) 141/78 (BP 1 Location: Right upper arm, BP Patient Position: At rest;Sitting) Temp 97.9 °F (36.6 °C) Resp 18 SpO2 (!) 86% Wt Readings from Last 3 Encounters:  
11/23/20 (!) 166.5 kg (367 lb) PHYSICAL EXAM 
L lateral leg wound measures smaller, slough debrided, no infection Pertinent items are noted in HPI. Assessment:  
Continued slow improvement Problem List Items Addressed This Visit Idiopathic chronic venous hypertension of left lower extremity with ulcer ([address] Localized edema POP IN PROCEDURE TYPE (DEBRIDEMENT, BIOPSY, I&D, SKIN SUB, CHEMICAL CAUERTY) Plan: Continue Nancy, Hydrofera Blue, [employer] Treatment Note please see attached Discharge Instructions Written patient dismissal instructions given to patient or POA. Electronically signed by Conrado Anthony MD on 3/1/2021 at 2:39 PM 
 
 
 
 
 
 
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 Performed by: Conrado Anthony MD 
 
Consent obtained: Yes Time out taken: Yes Debridement: Excisional 
 
Using curette the wound(s)/ulcer(s) was/were sharply debrided down through and including the removal of   
dermis and subcutaneous tissue Devitalized Tissue Debrided: slough Pre Debridement Measurements: 
Are located in the Home  Documentation Flow Sheet Non-Pressure ulcer, fat layer exposed Wound/Ulcer #: 1 Post Debridement Measurements: 
Wound/Ulcer Descriptions are Pre Debridement except measurements: 
 
Wound Leg lower Left;Lateral #1 (Active) Wound Image   03/01/21 1423 Wound Etiology Venous 03/01/21 1423 Dressing Status Clean;Dry; Intact 03/01/21 1423 Cleansed Soap and water 03/01/21 1423 Dressing/Treatment [employer] boot 10/26/20 1002 Wound Length (cm) 3.3 cm 03/01/21 1423 Wound Width (cm) 1.5 cm 03/01/21 1423 Wound Depth (cm) 0.2 cm 03/01/21 1423 Wound Surface Area (cm^2) 4.95 cm^2 03/01/21 1423 Change in Wound Size % 80.68 03/01/21 1423 Wound Volume (cm^3) 0.99 cm^3 03/01/21 1423 Wound Healing % 81 03/01/21 1423 Post-Procedure Length (cm) 3.3 cm 03/01/21 1435 Post-Procedure Width (cm) 1.5 cm 03/01/21 1435 Post-Procedure Depth (cm) 0.2 cm 03/01/21 1435 Post-Procedure Surface Area (cm^2) 4.95 cm^2 03/01/21 1435 Post-Procedure Volume (cm^3) 0.99 cm^3 03/01/21 1435 Wound Assessment Granulation tissue;Slough 03/01/21 1423 Drainage Amount Moderate 03/01/21 1423 Drainage Description Serosanguinous 03/01/21 1423 Wound Odor None 03/01/21 1423 Alissa-Wound/Incision Assessment Maceration 03/01/21 1423 Edges Epibole (rolled edges) 03/01/21 1423 Wound Thickness Description Full thickness 03/01/21 1423 Number of days: 168 Total Surface Area Debrided:  4 sq cm Estimated Blood Loss:  Minimal  
 
Hemostasis Achieved: Pressure Procedural Pain: 3 / 10 Post Procedural Pain: 1 / 10 Response to treatment: Well tolerated by patient

## 2021-03-08 ENCOUNTER — HOSPITAL ENCOUNTER (OUTPATIENT)
Dept: WOUND CARE | Age: 66
Discharge: HOME | End: 2021-03-08
Admitting: SPECIALIST
Payer: MEDICARE

## 2021-03-08 VITALS
DIASTOLIC BLOOD PRESSURE: 76 MMHG | HEART RATE: 88 BPM | OXYGEN SATURATION: 98 % | RESPIRATION RATE: 20 BRPM | SYSTOLIC BLOOD PRESSURE: 141 MMHG | TEMPERATURE: 97.9 F

## 2021-03-08 DIAGNOSIS — L97.929 IDIOPATHIC CHRONIC VENOUS HYPERTENSION OF LEFT LOWER EXTREMITY WITH ULCER (HCC): ICD-10-CM

## 2021-03-08 DIAGNOSIS — R60.0 LOCALIZED EDEMA: ICD-10-CM

## 2021-03-08 DIAGNOSIS — I87.312 IDIOPATHIC CHRONIC VENOUS HYPERTENSION OF LEFT LOWER EXTREMITY WITH ULCER (HCC): ICD-10-CM

## 2021-03-08 PROCEDURE — 11042 DBRDMT SUBQ TIS 1ST 20SQCM/<: CPT

## 2021-03-08 RX ORDER — LIDOCAINE HYDROCHLORIDE 20 MG/ML
15 SOLUTION OROPHARYNGEAL AS NEEDED
Status: DISCONTINUED | OUTPATIENT
Start: 2021-03-08 | End: 2021-03-10 | Stop reason: HOSPADM

## 2021-03-08 RX ORDER — BUMETANIDE 2 MG/1
2 TABLET ORAL 2 TIMES DAILY
COMMUNITY

## 2021-03-08 NOTE — WOUND CARE
Wound Care Supplies Supply Company:  
 
[employer] f: [phone] f: [phone]: [phone] [email or web address] Ordering Center: [facility] OP WOUND CARE 
[place] [address] [address] [address] [phone] WOUND CARE [unfilled] FAX NUMBER [id number] Patient Information:  
  
Mario Mandel [address] [place] [address] 83  
[unfilled] : 1955 AGE: 76 y.o. GENDER: male TODAYS DATE:  3/8/2021 Insurance: PRIMARY INSURANCE: 
[id number] - (Medicare) Payor/Plan Subscr  Sex Relation Sub. Ins. ID Effective Group Num 1. 11[address] [email or web address] E 1955 Male Self [id number] 3/1/1988 [address] 2. [place] (MEDICReatha Gama 1955 Male Self [id number] 3/1/1988 [address] - MAIL CODE [id number] Patient Wound Information:  
  
Problem List Items Addressed This Visit Circulatory Idiopathic chronic venous hypertension of left lower extremity with ulcer ([address] Other Localized edema Relevant Medications  
 bumetanide (BUMEX) 2 mg tablet WOUNDS REQUIRING DRESSING SUPPLIES:  
 
Wound Leg lower Left;Lateral #1 (Active) Wound Image   21 1418 Wound Etiology Venous 21 1418 Dressing Status Clean;Dry; Intact 21 1418 Cleansed Soap and water 21 1418 Dressing/Treatment Carlos campos 10/26/20 1002 Wound Length (cm) 3.4 cm 21 1418 Wound Width (cm) 2 cm 21 1418 Wound Depth (cm) 0.2 cm 21 1418 Wound Surface Area (cm^2) 6.8 cm^2 21 1418 Change in Wound Size % 73.46 21 1418 Wound Volume (cm^3) 1.36 cm^3 21 1418 Wound Healing % 73 21 1418 Post-Procedure Length (cm) 3.4 cm 21 1435 Post-Procedure Width (cm) 2 cm 21 1435 Post-Procedure Depth (cm) 0.2 cm 21 1435 Post-Procedure Surface Area (cm^2) 6.8 cm^2 03/08/21 1435 Post-Procedure Volume (cm^3) 1.36 cm^3 03/08/21 1435 Wound Assessment Granulation tissue;Slough 03/08/21 1418 Drainage Amount Moderate 03/08/21 1418 Drainage Description Serosanguinous 03/08/21 1418 Wound Odor None 03/08/21 1418 Alissa-Wound/Incision Assessment Hyperpigmented 03/08/21 1418 Edges Epibole (rolled edges) 03/08/21 1418 Wound Thickness Description Full thickness 03/08/21 1418 Number of days: 175 Supplies Requested :  
  
WOUND #:1  
PRIMARY DRESSING: 
Other: endoform None FREQUENCY OF DRESSING CHANGES: 
Once a week WOUND #:   
 
 
ADDITIONAL ITEMS: 
[] Gloves Small  [] Gloves Medium [] Gloves Large [] Gloves XLarge 
[] Tape 1\" [] Tape 2\" [] Tape 3\" [] Medipore Tape 
[] Saline 
[] Skin Prep  
[] Adhesive Remover  
[] Cotton Tip Applicators  
[] Other: 
 
Patient Wound(s) Debrided: [x] Yes if yes please add date 03/08/2021 [] No 
 
Debribement Type: Excisional/Sharp Patient currently being seen by Home Health: [] Yes   [x] No 
 
Duration for needed supplies:  []15  [x]30  []60  []90 Days Electronically signed by Ney Cary LPN on 3/1/6815 at 8:66 PM 
 
Provider Information:  
  
PROVIDER'S NAME: Coleen Alonso MD 
 
NPI:

## 2021-03-08 NOTE — WOUND CARE
[employer] Below Knee NAME:  Juve Ibanez YOB: 1955 MEDICAL RECORD NUMBER:  [id number] DATE:  3/8/2021 Remove old Unna Boot or Boots. Applied moisturizing agent to dry skin as needed. Appied primary and secondary dressing as ordered. Applied Unna roll from toes to knee overlapping each time. Applied ace wrap or coban from toes to below the knee. Secured with tape and/or metal clips covered with tape. Instructed patient/caregiver to keep dressing dry and intact. DO NOT REMOVE DRESSING. Instructed pt/family/caregiver to report excessive draining, loose bandage, wet dressing, severe pain or tingling in toes. Applied [place] dressing below the knee to left lower leg. Unna Boot(s) were applied per  Guidelines. Response to treatment: Well tolerated by patient  Electronically signed by Titus Kirby LPN on 3/8/6652 at 9:22 PM

## 2021-03-08 NOTE — WOUND CARE
[address] Progress Note and Procedure Note Beth Fitzgerald MEDICAL RECORD NUMBER:  [id number] AGE: 76 y.o. RACE WHITE  GENDER: male  : 1955 EPISODE DATE:  3/8/2021 Subjective: No new problems Chief Complaint Patient presents with  Wound Check  
  left leg HISTORY of PRESENT ILLNESS HPI Beth Fitzgerald is a 76 y.o. male who presents today for wound/ulcer evaluation. History of Wound Context: L leg Wound/Ulcer Pain Timing/Severity: mild Quality of pain: burning Severity:  2 / 10 Modifying Factors: None Associated Signs/Symptoms: edema Ulcer Identification: 
Ulcer Type: venous Contributing Factors: lymphedema Wound: L leg PAST MEDICAL HISTORY Past Medical History:  
Diagnosis Date  Arthritis  Chronic obstructive pulmonary disease ([facility]  Diabetes ([facility]  Gout  Hypertension  Sleep apnea PAST SURGICAL HISTORY Past Surgical History:  
Procedure Laterality Date  HX ORTHOPAEDIC    
 HX VEIN ABLATION ADHESIVE    
 
 
FAMILY HISTORY Family History Problem Relation Age of Onset  Heart Disease Mother  Kidney Disease Mother  Hypertension Mother  Diabetes Mother  Heart Disease Father  Hypertension Father  Diabetes Sister  Diabetes Brother SOCIAL HISTORY Social History Tobacco Use  Smoking status: Never Smoker  Smokeless tobacco: Never Used Substance Use Topics  Alcohol use: Yes  Drug use: Never ALLERGIES Allergies Allergen Reactions  Elavil Unknown (comments)  Sulfa (Sulfonamide Antibiotics) Nausea and Vomiting MEDICATIONS Current Outpatient Medications on File Prior to Encounter Medication Sig Dispense Refill  bumetanide (BUMEX) 2 mg tablet Take 2 mg by mouth two (2) times a day.  multivits,Stress Formula-Zinc tablet Take 1 Tab by mouth daily.  vitamin e (E GEMS) 1,000 unit capsule Take 1,000 Units by mouth two (2) times a day.  multivitamin (ONE A DAY) tablet Take 1 Tab by mouth daily.  ferrous sulfate (Iron) 325 mg (65 mg iron) tablet Take  by mouth Daily (before breakfast).  aspirin 81 mg chewable tablet Take 81 mg by mouth daily.  flunisolide (NASAREL) 25 mcg (0.025 %) spry 2 Sprays two (2) times a day.  albuterol (ProAir HFA) 90 mcg/actuation inhaler Take  by inhalation.  colchicine 0.6 mg tablet Take 0.6 mg by mouth daily.  gabapentin (NEURONTIN) 300 mg capsule Take 300 mg by mouth four (4) times daily.  oxyCODONE IR (ROXICODONE) 5 mg immediate release tablet Take 5 mg by mouth every twelve (12) hours.  metFORMIN (GLUCOPHAGE) 500 mg tablet Take 1,000 mg by mouth two (2) times daily (with meals).  hydroCHLOROthiazide (HYDRODIURIL) 25 mg tablet Take 25 mg by mouth daily.  insulin lispro (HumaLOG U-100 Insulin) 100 unit/mL injection by SubCUTAneous route. Sliding scale  insulin glargine (LANTUS) 100 unit/mL injection by SubCUTAneous route nightly. 80units am/ 20units pm    
 
No current facility-administered medications on file prior to encounter. REVIEW OF SYSTEMS Pertinent items are noted in HPI. Objective:  
 
Visit Vitals BP (!) 141/76 (BP Patient Position: At rest;Sitting) Pulse 88 Temp 97.9 °F (36.6 °C) Resp 20 SpO2 98% Wt Readings from Last 3 Encounters:  
11/23/20 (!) 166.5 kg (367 lb) PHYSICAL EXAM 
Wound area measures slightly larger, wound remodeling No evidence of infection Pertinent items are noted in HPI. Assessment:  
Slow progress Problem List Items Addressed This Visit Idiopathic chronic venous hypertension of left lower extremity with ulcer ([address] Localized edema Relevant Medications  
 bumetanide (BUMEX) 2 mg tablet POP IN PROCEDURE TYPE (DEBRIDEMENT, BIOPSY, I&D, SKIN SUB, CHEMICAL CAUERTY) Plan: Switch to Endoform Treatment Note please see attached Discharge Instructions Written patient dismissal instructions given to patient or POA. Electronically signed by Elaine Beal MD on 3/8/2021 at 2:47 PM 
 
 
 
 
 
 
Debridement Wound Care Problem List Items Addressed This Visit Idiopathic chronic venous hypertension of left lower extremity with ulcer ([address] Localized edema Relevant Medications  
 bumetanide (BUMEX) 2 mg tablet Procedure Note Indications:  Based on my examination of this patient's wound(s)/ulcer(s) today, debridement is required to promote healing and evaluate the wound base. Performed by: Elaine Beal MD 
 
Consent obtained: Yes Time out taken: Yes Debridement: Excisional 
 
Using curette the wound(s)/ulcer(s) was/were sharply debrided down through and including the removal of   
subcutaneous tissue Devitalized Tissue Debrided: slough Pre Debridement Measurements: 
Are located in the [place]  Documentation Flow Sheet Non-Pressure ulcer, fat layer exposed Wound/Ulcer #: 1 Post Debridement Measurements: 
Wound/Ulcer Descriptions are Pre Debridement except measurements: 
 
Wound Leg lower Left;Lateral #1 (Active) Wound Image   03/08/21 1418 Wound Etiology Venous 03/08/21 1418 Dressing Status Clean;Dry; Intact 03/08/21 1418 Cleansed Soap and water 03/08/21 1418 Dressing/Treatment Will campos 10/26/20 1002 Wound Length (cm) 3.4 cm 03/08/21 1418 Wound Width (cm) 2 cm 03/08/21 1418 Wound Depth (cm) 0.2 cm 03/08/21 1418 Wound Surface Area (cm^2) 6.8 cm^2 03/08/21 1418 Change in Wound Size % 73.46 03/08/21 1418 Wound Volume (cm^3) 1.36 cm^3 03/08/21 1418 Wound Healing % 73 03/08/21 1418 Post-Procedure Length (cm) 3.4 cm 03/08/21 1435 Post-Procedure Width (cm) 2 cm 03/08/21 1435 Post-Procedure Depth (cm) 0.2 cm 03/08/21 1435 Post-Procedure Surface Area (cm^2) 6.8 cm^2 03/08/21 1435 Post-Procedure Volume (cm^3) 1.36 cm^3 03/08/21 1435 Wound Assessment Granulation tissue;Slough 03/08/21 1418 Drainage Amount Moderate 03/08/21 1418 Drainage Description Serosanguinous 03/08/21 1418 Wound Odor None 03/08/21 1418 Alissa-Wound/Incision Assessment Hyperpigmented 03/08/21 1418 Edges Epibole (rolled edges) 03/08/21 1418 Wound Thickness Description Full thickness 03/08/21 1418 Number of days: 175 Total Surface Area Debrided:  6 sq cm Estimated Blood Loss:  Minimal  
 
Hemostasis Achieved: Pressure Procedural Pain: 5 / 10 Post Procedural Pain: 2 / 10 Response to treatment: Well tolerated by patient

## 2021-03-08 NOTE — WOUND CARE
[facility], [address] [place] [address] Telephone: [phone] NAME:  Alexander Sharma YOB: 1955 MEDICAL RECORD NUMBER:  [id number] DATE:  3/8/2021 Return Appointment: 
[] Dressing supply provider:  
[] Home Healthcare:   
[x] Return Appointment: 1 Week(s) 
[] Nurse Visit:  Week(s) 
 
[] Discharge from [facility] [] Ordered tests:  
[] Referrals:  
[] Rx:  
 
Wound Cleansing: Do not scrub or use excessive force. Cleanse wound prior to applying a clean dressing with: 
[x] Normal Saline  
[x] Mild Soap & Water   
[] Keep Wound Dry in Shower 
[] Other:   
 
Topical Treatments: Do not apply lotions, creams, or ointments to wound bed unless directed. [x] Apply moisturizing lotion to skin surrounding the wound prior to dressing change. 
[] Apply antifungal ointment to skin surrounding the wound prior to dressing change. 
[] Apply thin film of moisture barrier ointment to skin immediately around wound. [] Other:   
  
Dressings:           Wound Location Left leg [x] Apply Primary Dressing:     
 [] MediHoney Gel    [] MediHoney Alginate  
            [] Calcium Alginate      [] Calcium Alginate with Silver 
 [] Collagen                   [x] Collagen with Silver   
            [] Santyl with Moistened saline gauze [x] Hydrofera Blue (cut to size and moistened)  [] Hydrofera Blue Ready (Cut to size) [] NS wet to dry    [] Betadine wet to dry 
            [] Hydrogel                [] Mepitel   
 [] Bactroban/Mupirocin  
            [x] Other: Drawtex [x] Cover and Secure with:   
 [x] Gauze [] Cristian [] Kerlix [] Foam [] Super Absorbant [] ABD [] ACE Wrap            [] Other:  
 Avoid contact of tape with skin. [x] Change dressing: [] Daily    [] Every Other Day [] Once per week   [] Twice per week 
 [] Three times per week [x] Do Not Change Dressing   [] Other: 
  
Compression: Apply: [x] Multilayer Compression Wrap: []RightLeg [x]Left Leg 
                               []Profore  []Profore Lite             [x]Unna [x] Do not get leg(s) with wrap wet. If wraps become too tight call the center or completely remove the wrap. [x] Elevate leg(s) above the level of the heart when sitting. [x] Avoid prolonged standing in one place. Dietary: 
[x] Diet as tolerated: [] Calorie Diabetic Diet: [x] No Added Salt: 
[] Increase Protein: [] Other:  
 
Activity: 
[x] Activity as tolerated:   
[] Patient has no activity restrictions [] Strict Bedrest:  
[] Remain off Work:      
[] May return to full duty work:                                    
[] Return to work with restrictions:  
         
 
[address] Information: Should you experience any significant changes in your wound(s) or have questions about your wound care, please contact Thais Caldera at [place] [address] 8:00 am - 4:30. If you need help with your wound outside of these hours and cannot wait until we are again available, contact your PCP or go to the hospital emergency room. PLEASE NOTE: IF YOU ARE UNABLE TO OBTAIN WOUND SUPPLIES, CONTINUE TO USE THE SUPPLIES YOU HAVE AVAILABLE UNTIL YOU ARE ABLE TO REACH US. IT IS MOST IMPORTANT TO KEEP THE WOUND COVERED AT ALL TIMES. [x] Dr. Tai Prieto  
[] Dr. Demetrice Spencer 
[] [facility] [] Dr. Beena Díaz

## 2021-03-08 NOTE — DISCHARGE INSTRUCTIONS
[address] [place] [address], [address] [place] [address]  Telephone: [phone]    NAME:  Natalie Zapien OF BIRTH:  1955  MEDICAL RECORD NUMBER:  [id number]  DATE:  3/8/2021      Return Appointment:  [] Dressing supply provider:   [] Home Healthcare:    [x] Return Appointment: 1 Week(s)  [] Nurse Visit:  Week(s)    [] Discharge from [facility]  [] Ordered tests:   [] Referrals:   [] Rx:     Wound Cleansing:   Do not scrub or use excessive force. Cleanse wound prior to applying a clean dressing with:  [] Normal Saline   [x] Mild Soap & Water    [] Keep Wound Dry in Shower  [] Other:      Topical Treatments:  Do not apply lotions, creams, or ointments to wound bed unless directed. [x] Apply moisturizing lotion to skin surrounding the wound prior to dressing change.  [] Apply antifungal ointment to skin surrounding the wound prior to dressing change.  [] Apply thin film of moisture barrier ointment to skin immediately around wound. [] Other:       Dressings:           Wound Location Left leg   [x] Apply Primary Dressing:       [] MediHoney Gel    [] MediHoney Alginate               [] Calcium Alginate      [] Calcium Alginate with Silver   [] Collagen                   [x] Collagen with Silver: Endoform                [] Santyl with Moistened saline gauze              [] Hydrofera Blue (cut to size and moistened)  [] Hydrofera Blue Ready (Cut to size)   [] NS wet to dry    [] Betadine wet to dry              [] Hydrogel                [] Mepitel     [] Bactroban/Mupirocin               [x] Other: Drawtex    [x] Cover and Secure with:     [x] Gauze [] Martir Mendoza [] Kerlix   [] Foam [] Super Absorbant [] ABD     [] ACE Wrap            [] Other:    Avoid contact of tape with skin.     [x] Change dressing: [] Daily    [] Every Other Day [] Once per week   [] Twice per week   [] Three times per week [x] Do Not Change Dressing   [] Other:     Compression:  Apply: [x] Multilayer Compression Wrap: []RightLeg [x]Left Leg                                 []Profore  []Profore Lite             [x]Unna     [x] Do not get leg(s) with wrap wet. If wraps become too tight call the center or completely remove the wrap. [x] Elevate leg(s) above the level of the heart when sitting. [x] Avoid prolonged standing in one place. Dietary:  [x] Diet as tolerated: [] Calorie Diabetic Diet: [x] No Added Salt:  [] Increase Protein: [] Other:     Activity:  [x] Activity as tolerated:    [] Patient has no activity restrictions       [] Strict Bedrest:   [] Remain off Work:       [] May return to full duty work:                                     [] Return to work with restrictions:               [address] Information: Should you experience any significant changes in your wound(s) or have questions about your wound care, please contact Thais Caldera at [place] [address] 8:00 am - 4:30. If you need help with your wound outside of these hours and cannot wait until we are again available, contact your PCP or go to the hospital emergency room. PLEASE NOTE: IF YOU ARE UNABLE TO OBTAIN WOUND SUPPLIES, CONTINUE TO USE THE SUPPLIES YOU HAVE AVAILABLE UNTIL YOU ARE ABLE TO REACH US. IT IS MOST IMPORTANT TO KEEP THE WOUND COVERED AT ALL TIMES.     [x] Dr. Braulio Nixon   [] Dr. Wellington Jacobsen  [] [facility]  [] Dr. Yinka Acevedo

## 2021-03-15 ENCOUNTER — HOSPITAL ENCOUNTER (OUTPATIENT)
Dept: WOUND CARE | Age: 66
Discharge: HOME | End: 2021-03-15
Payer: MEDICARE

## 2021-03-15 VITALS
DIASTOLIC BLOOD PRESSURE: 64 MMHG | RESPIRATION RATE: 20 BRPM | HEART RATE: 90 BPM | TEMPERATURE: 98.4 F | OXYGEN SATURATION: 99 % | SYSTOLIC BLOOD PRESSURE: 120 MMHG

## 2021-03-15 PROCEDURE — 11042 DBRDMT SUBQ TIS 1ST 20SQCM/<: CPT

## 2021-03-15 RX ORDER — LIDOCAINE HYDROCHLORIDE 20 MG/ML
15 SOLUTION OROPHARYNGEAL AS NEEDED
Status: DISCONTINUED | OUTPATIENT
Start: 2021-03-15 | End: 2021-03-17 | Stop reason: HOSPADM

## 2021-03-15 NOTE — WOUND CARE
[employer] Below Knee NAME:  Tunde Mello YOB: 1955 MEDICAL RECORD NUMBER:  [id number] DATE:  3/15/2021 Remove old Unna Boot or Boots. Applied moisturizing agent to dry skin as needed. Appied primary and secondary dressing as ordered. Applied Unna roll from toes to knee overlapping each time. Applied ace wrap or coban from toes to below the knee. Secured with tape and/or metal clips covered with tape. Instructed patient/caregiver to keep dressing dry and intact. DO NOT REMOVE DRESSING. Instructed pt/family/caregiver to report excessive draining, loose bandage, wet dressing, severe pain or tingling in toes. Applied [place] dressing below the knee to left lower leg. Unna Boot(s) were applied per  Guidelines. Response to treatment: Well tolerated by patient  Electronically signed by Olivier Medina RN on 3/15/2021 at 2:31 PM

## 2021-03-15 NOTE — WOUND CARE
[address] Telephone: [phone] NAME:  Marina Vaughn YOB: 1955 MEDICAL RECORD NUMBER:  [id number] DATE:  3/15/2021 Return Appointment: 
[] Dressing supply provider:  
[] Home Healthcare:   
[x] Return Appointment: 2 Week(s) [x] Nurse Visit:  1 Week(s) 
 
[] Discharge from [facility] [] Ordered tests:  
[] Referrals:  
[] Rx:  
 
Wound Cleansing: Do not scrub or use excessive force. Cleanse wound prior to applying a clean dressing with: 
[x] Normal Saline  
[x] Mild Soap & Water   
[] Keep Wound Dry in Shower 
[] Other:   
 
Topical Treatments: Do not apply lotions, creams, or ointments to wound bed unless directed. [x] Apply moisturizing lotion to skin surrounding the wound prior to dressing change. 
[] Apply antifungal ointment to skin surrounding the wound prior to dressing change. 
[] Apply thin film of moisture barrier ointment to skin immediately around wound. [] Other:   
  
Dressings:           Wound Location Left leg [x] Apply Primary Dressing:     
 [] MediHoney Gel    [] MediHoney Alginate  
            [] Calcium Alginate      [] Calcium Alginate with Silver 
 [] Collagen                   [x] Collagen with Silver   
            [] Santyl with Moistened saline gauze 
            [] Hydrofera Blue (cut to size and moistened)  [] Hydrofera Blue Ready (Cut to size) [] NS wet to dry    [] Betadine wet to dry 
            [] Hydrogel                [] Mepitel   
 [] Bactroban/Mupirocin  
            [x] Other: Drawtex [x] Cover and Secure with:   
 [x] Gauze [] Cristian [] Kerlix [] Foam [] Super Absorbant [] ABD [] ACE Wrap            [] Other:  
 Avoid contact of tape with skin.  
 
[x] Change dressing: [] Daily    [] Every Other Day [] Once per week   [] Twice per week 
 [] Three times per week [x] Do Not Change Dressing   [] Other: 
  
Compression: 
Apply: [x] Multilayer Compression Wrap: []RightLeg [x]Left Leg 
                               []Profore  []Profore Lite             [x]Unna [x] Do not get leg(s) with wrap wet. If wraps become too tight call the center or completely remove the wrap. [x] Elevate leg(s) above the level of the heart when sitting. [x] Avoid prolonged standing in one place. Dietary: 
[x] Diet as tolerated: [] Calorie Diabetic Diet: [x] No Added Salt: 
[] Increase Protein: [] Other:  
 
Activity: 
[x] Activity as tolerated:   
[] Patient has no activity restrictions [] Strict Bedrest:  
[] Remain off Work:      
[] May return to full duty work:                                    
[] Return to work with restrictions:  
         
 
[address] Information: Should you experience any significant changes in your wound(s) or have questions about your wound care, please contact Thais Caldera at [place] [address] 8:00 am - 4:30. If you need help with your wound outside of these hours and cannot wait until we are again available, contact your PCP or go to the hospital emergency room. PLEASE NOTE: IF YOU ARE UNABLE TO OBTAIN WOUND SUPPLIES, CONTINUE TO USE THE SUPPLIES YOU HAVE AVAILABLE UNTIL YOU ARE ABLE TO REACH US. IT IS MOST IMPORTANT TO KEEP THE WOUND COVERED AT ALL TIMES. [x] Dr. Ester Antunez  
[] Dr. Marcel Del Cid 
[] [facility] [] Dr. Brittany Anderson

## 2021-03-15 NOTE — DISCHARGE INSTRUCTIONS
[address] [place] [address] [address], [facility]  Telephone: [phone]    NAME:  Carrington Salamanca OF BIRTH:  1955  MEDICAL RECORD NUMBER:  [id number]  DATE:  3/15/2021      Return Appointment:  [] Dressing supply provider:   [] Home Healthcare:    [x] Return Appointment: 2 Week(s)  [x] Nurse Visit:  1 Week(s)    [] Discharge from [facility] [place]  [] Ordered tests:   [] Referrals:   [] Rx:     Wound Cleansing:   Do not scrub or use excessive force. Cleanse wound prior to applying a clean dressing with:  [x] Normal Saline   [x] Mild Soap & Water    [] Keep Wound Dry in Shower  [] Other:      Topical Treatments:  Do not apply lotions, creams, or ointments to wound bed unless directed. [x] Apply moisturizing lotion to skin surrounding the wound prior to dressing change.  [] Apply antifungal ointment to skin surrounding the wound prior to dressing change.  [] Apply thin film of moisture barrier ointment to skin immediately around wound. [] Other:       Dressings:           Wound Location Left leg   [x] Apply Primary Dressing:       [] MediHoney Gel    [] MediHoney Alginate               [] Calcium Alginate      [] Calcium Alginate with Silver   [] Collagen                   [x] Collagen with Silver                [] Santyl with Moistened saline gauze              [] Hydrofera Blue (cut to size and moistened)  [] Hydrofera Blue Ready (Cut to size)   [] NS wet to dry    [] Betadine wet to dry              [] Hydrogel                [] Mepitel     [] Bactroban/Mupirocin               [x] Other: Drawtex    [x] Cover and Secure with:     [x] Gauze [] Lillie Pile [] Kerlix   [] Foam [] Super Absorbant [] ABD     [] ACE Wrap            [] Other:    Avoid contact of tape with skin.     [x] Change dressing: [] Daily    [] Every Other Day [] Once per week   [] Twice per week   [] Three times per week [x] Do Not Change Dressing   [] Other:     Compression:  Apply: [x] Multilayer Compression Wrap: []RightLeg [x]Left Leg                                 []Profore  []Profore Lite             [x]Unna     [x] Do not get leg(s) with wrap wet. If wraps become too tight call the center or completely remove the wrap. [x] Elevate leg(s) above the level of the heart when sitting. [x] Avoid prolonged standing in one place. Dietary:  [x] Diet as tolerated: [] Calorie Diabetic Diet: [x] No Added Salt:  [] Increase Protein: [] Other:     Activity:  [x] Activity as tolerated:    [] Patient has no activity restrictions       [] Strict Bedrest:   [] Remain off Work:       [] May return to full duty work:                                     [] Return to work with restrictions:               [address] Information: Should you experience any significant changes in your wound(s) or have questions about your wound care, please contact Thais Caldera at [place] [address] 8:00 am - 4:30. If you need help with your wound outside of these hours and cannot wait until we are again available, contact your PCP or go to the hospital emergency room. PLEASE NOTE: IF YOU ARE UNABLE TO OBTAIN WOUND SUPPLIES, CONTINUE TO USE THE SUPPLIES YOU HAVE AVAILABLE UNTIL YOU ARE ABLE TO REACH US. IT IS MOST IMPORTANT TO KEEP THE WOUND COVERED AT ALL TIMES.     [x] Dr. Jason Esparza   [] Dr. Christa An  [] [facility]  [] Dr. Neil Johns

## 2021-03-15 NOTE — WOUND CARE
[address] Progress Note and Procedure Note Lulu Flores MEDICAL RECORD NUMBER:  [id number] AGE: 76 y.o. RACE WHITE  GENDER: male  : 1955 EPISODE DATE:  3/15/2021 Subjective: No new problems Chief Complaint Patient presents with  Wound Check L lower leg HISTORY of PRESENT ILLNESS HPI Lulu Flores is a 76 y.o. male who presents today for wound/ulcer evaluation. History of Wound Context: L leg Wound/Ulcer Pain Timing/Severity: mild Quality of pain: dull Severity:  1 / 10 Modifying Factors: Pain is relieved/improved with rest 
Associated Signs/Symptoms: edema Ulcer Identification: 
Ulcer Type: venous Contributing Factors: diabetes Wound: L leg PAST MEDICAL HISTORY Past Medical History:  
Diagnosis Date  Arthritis  Chronic obstructive pulmonary disease ([facility]  Diabetes ([facility]  Gout  Hypertension  Sleep apnea PAST SURGICAL HISTORY Past Surgical History:  
Procedure Laterality Date  HX ORTHOPAEDIC    
 HX VEIN ABLATION ADHESIVE    
 
 
FAMILY HISTORY Family History Problem Relation Age of Onset  Heart Disease Mother  Kidney Disease Mother  Hypertension Mother  Diabetes Mother  Heart Disease Father  Hypertension Father  Diabetes Sister  Diabetes Brother SOCIAL HISTORY Social History Tobacco Use  Smoking status: Never Smoker  Smokeless tobacco: Never Used Substance Use Topics  Alcohol use: Yes  Drug use: Never ALLERGIES Allergies Allergen Reactions  Elavil Unknown (comments)  Sulfa (Sulfonamide Antibiotics) Nausea and Vomiting MEDICATIONS Current Outpatient Medications on File Prior to Encounter Medication Sig Dispense Refill  bumetanide (BUMEX) 2 mg tablet Take 2 mg by mouth two (2) times a day.  multivits,Stress Formula-Zinc tablet Take 1 Tab by mouth daily.     
 vitamin e (E GEMS) 1,000 unit capsule Take 1,000 Units by mouth two (2) times a day.  multivitamin (ONE A DAY) tablet Take 1 Tab by mouth daily.  ferrous sulfate (Iron) 325 mg (65 mg iron) tablet Take  by mouth Daily (before breakfast).  aspirin 81 mg chewable tablet Take 81 mg by mouth daily.  flunisolide (NASAREL) 25 mcg (0.025 %) spry 2 Sprays two (2) times a day.  albuterol (ProAir HFA) 90 mcg/actuation inhaler Take  by inhalation.  colchicine 0.6 mg tablet Take 0.6 mg by mouth daily.  gabapentin (NEURONTIN) 300 mg capsule Take 300 mg by mouth four (4) times daily.  oxyCODONE IR (ROXICODONE) 5 mg immediate release tablet Take 5 mg by mouth every twelve (12) hours.  metFORMIN (GLUCOPHAGE) 500 mg tablet Take 1,000 mg by mouth two (2) times daily (with meals).  hydroCHLOROthiazide (HYDRODIURIL) 25 mg tablet Take 25 mg by mouth daily.  insulin lispro (HumaLOG U-100 Insulin) 100 unit/mL injection by SubCUTAneous route. Sliding scale  insulin glargine (LANTUS) 100 unit/mL injection by SubCUTAneous route nightly. 80units am/ 20units pm    
 
No current facility-administered medications on file prior to encounter. REVIEW OF SYSTEMS Pertinent items are noted in HPI. Objective:  
 
Visit Vitals /64 (BP 1 Location: Left lower arm, BP Patient Position: Sitting) Pulse 90 Temp 98.4 °F (36.9 °C) Resp 20 SpO2 99% Wt Readings from Last 3 Encounters:  
11/23/20 (!) 166.5 kg (367 lb) PHYSICAL EXAM 
L leg wound measures smaller, no infection Pertinent items are noted in HPI. Assessment:  
 slow steady improvement Problem List Items Addressed This Visit None POP IN PROCEDURE TYPE (DEBRIDEMENT, BIOPSY, I&D, SKIN SUB, CHEMICAL CAUERTY) Plan:  
endoform and unna boot Treatment Note please see attached Discharge Instructions Written patient dismissal instructions given to patient or POA.       
 
Electronically signed by Fallon Jacobson Emelia Carpio MD on 3/15/2021 at 2:09 PM 
 
 
 
 
 
 
Debridement Wound Care Problem List Items Addressed This Visit None Procedure Note Indications:  Based on my examination of this patient's wound(s)/ulcer(s) today, debridement is required to promote healing and evaluate the wound base. Performed by: Jayesh Villalobos MD 
 
Consent obtained: Yes Time out taken: Yes Debridement: Excisional 
 
Using curette the wound(s)/ulcer(s) was/were sharply debrided down through and including the removal of   
dermis and subcutaneous tissue Devitalized Tissue Debrided: slough Pre Debridement Measurements: 
Are located in the [place]  Documentation Flow Sheet Non-Pressure ulcer, fat layer exposed Wound/Ulcer #: 1 Post Debridement Measurements: 
Wound/Ulcer Descriptions are Pre Debridement except measurements: 
 
Wound Leg lower Left;Lateral #1 (Active) Wound Image   03/15/21 1342 Wound Etiology Venous 03/15/21 1342 Dressing Status Clean;Dry; Intact 03/15/21 1342 Cleansed Soap and water 03/15/21 1342 Dressing/Treatment German campos 10/26/20 1002 Offloading for Diabetic Foot Ulcers No 03/15/21 1342 Wound Length (cm) 2.7 cm 03/15/21 1342 Wound Width (cm) 1.9 cm 03/15/21 1342 Wound Depth (cm) 0.2 cm 03/15/21 1342 Wound Surface Area (cm^2) 5.13 cm^2 03/15/21 1342 Change in Wound Size % 79.98 03/15/21 1342 Wound Volume (cm^3) 1.03 cm^3 03/15/21 1342 Wound Healing % 80 03/15/21 1342 Post-Procedure Length (cm) 2.7 cm 03/15/21 1404 Post-Procedure Width (cm) 1.9 cm 03/15/21 1404 Post-Procedure Depth (cm) 0.2 cm 03/15/21 1404 Post-Procedure Surface Area (cm^2) 5.13 cm^2 03/15/21 1404 Post-Procedure Volume (cm^3) 1.03 cm^3 03/15/21 1404 Wound Assessment Slough;Granulation tissue 03/15/21 1342 Drainage Amount Moderate 03/15/21 1342 Drainage Description Serosanguinous 03/15/21 1342 Wound Odor None 03/15/21 1342 Alissa-Wound/Incision Assessment Hyperpigmented 03/15/21 1342 Edges Epibole (rolled edges) 03/15/21 1342 Wound Thickness Description Full thickness 03/15/21 1342 Number of days: 182 Total Surface Area Debrided:  5 sq cm Estimated Blood Loss:  Minimal  
 
Hemostasis Achieved: Pressure Procedural Pain: 3 / 10 Post Procedural Pain: 1 / 10 Response to treatment: Well tolerated by patient

## 2021-03-22 ENCOUNTER — HOSPITAL ENCOUNTER (OUTPATIENT)
Dept: WOUND CARE | Age: 66
Discharge: HOME | End: 2021-03-22
Admitting: SPECIALIST
Payer: MEDICARE

## 2021-03-22 VITALS
TEMPERATURE: 97.6 F | RESPIRATION RATE: 20 BRPM | DIASTOLIC BLOOD PRESSURE: 93 MMHG | HEART RATE: 73 BPM | SYSTOLIC BLOOD PRESSURE: 161 MMHG

## 2021-03-22 DIAGNOSIS — L97.929 IDIOPATHIC CHRONIC VENOUS HYPERTENSION OF LEFT LOWER EXTREMITY WITH ULCER (HCC): ICD-10-CM

## 2021-03-22 DIAGNOSIS — R60.0 LOCALIZED EDEMA: ICD-10-CM

## 2021-03-22 DIAGNOSIS — I87.312 IDIOPATHIC CHRONIC VENOUS HYPERTENSION OF LEFT LOWER EXTREMITY WITH ULCER (HCC): ICD-10-CM

## 2021-03-22 PROCEDURE — 29580 STRAPPING UNNA BOOT: CPT

## 2021-03-22 NOTE — WOUND CARE
[employer] Below Knee NAME:  Sue Ryder YOB: 1955 MEDICAL RECORD NUMBER:  [id number] DATE:  3/22/2021 Remove old Unna Boot or Boots. Applied moisturizing agent to dry skin as needed. Appied primary and secondary dressing as ordered. Applied Unna roll from toes to knee overlapping each time. Applied ace wrap or coban from toes to below the knee. Secured with tape and/or metal clips covered with tape. Instructed patient/caregiver to keep dressing dry and intact. DO NOT REMOVE DRESSING. Instructed pt/family/caregiver to report excessive draining, loose bandage, wet dressing, severe pain or tingling in toes. Applied [place] dressing below the knee to left lower leg. Unna Boot(s) were applied per  Guidelines. Response to treatment: Well tolerated by patient  Electronically signed by Dariel Park RN on 3/22/2021 at 10:21 AM

## 2021-03-29 ENCOUNTER — HOSPITAL ENCOUNTER (OUTPATIENT)
Dept: WOUND CARE | Age: 66
Discharge: HOME | End: 2021-03-29
Admitting: SPECIALIST
Payer: MEDICARE

## 2021-03-29 VITALS
WEIGHT: 300 LBS | SYSTOLIC BLOOD PRESSURE: 122 MMHG | HEIGHT: 69 IN | DIASTOLIC BLOOD PRESSURE: 65 MMHG | RESPIRATION RATE: 20 BRPM | BODY MASS INDEX: 44.43 KG/M2 | TEMPERATURE: 98 F | HEART RATE: 70 BPM | OXYGEN SATURATION: 99 %

## 2021-03-29 DIAGNOSIS — L97.929 IDIOPATHIC CHRONIC VENOUS HYPERTENSION OF LEFT LOWER EXTREMITY WITH ULCER (HCC): ICD-10-CM

## 2021-03-29 DIAGNOSIS — R60.0 LOCALIZED EDEMA: ICD-10-CM

## 2021-03-29 DIAGNOSIS — I87.312 IDIOPATHIC CHRONIC VENOUS HYPERTENSION OF LEFT LOWER EXTREMITY WITH ULCER (HCC): ICD-10-CM

## 2021-03-29 PROCEDURE — 11042 DBRDMT SUBQ TIS 1ST 20SQCM/<: CPT

## 2021-03-29 RX ORDER — LIDOCAINE HYDROCHLORIDE 20 MG/ML
15 SOLUTION OROPHARYNGEAL AS NEEDED
Status: DISCONTINUED | OUTPATIENT
Start: 2021-03-29 | End: 2021-03-31 | Stop reason: HOSPADM

## 2021-03-29 NOTE — WOUND CARE
[facility], [facility] Telephone: [phone] NAME:  Barbara Watson YOB: 1955 MEDICAL RECORD NUMBER:  [id number] DATE:  3/29/2021 Return Appointment: 
[] Dressing supply provider:  
[] Home Healthcare:   
[x] Return Appointment: 1 Week(s) 
[] Nurse Visit:   Week(s) 
 
[] Discharge from [facility] [] Ordered tests:  
[] Referrals:  
[] Rx:  
 
Wound Cleansing: Do not scrub or use excessive force. Cleanse wound prior to applying a clean dressing with: 
[x] Normal Saline  
[x] Mild Soap & Water   
[] Keep Wound Dry in Shower 
[] Other:   
 
Topical Treatments: Do not apply lotions, creams, or ointments to wound bed unless directed. [x] Apply moisturizing lotion to skin surrounding the wound prior to dressing change. 
[] Apply antifungal ointment to skin surrounding the wound prior to dressing change. 
[] Apply thin film of moisture barrier ointment to skin immediately around wound. [] Other:   
  
Dressings:           Wound Location Left leg [x] Apply Primary Dressing:     
 [] MediHoney Gel    [] MediHoney Alginate  
            [] Calcium Alginate      [] Calcium Alginate with Silver 
 [] Collagen                   [x] Collagen with Silver   
            [] Santyl with Moistened saline gauze 
            [] Hydrofera Blue (cut to size and moistened)  [] Hydrofera Blue Ready (Cut to size) [] NS wet to dry    [] Betadine wet to dry 
            [] Hydrogel                [] Mepitel   
 [] Bactroban/Mupirocin  
            [x] Other: Drawtex [x] Cover and Secure with:   
 [x] Gauze [] Cristian [] Kerlix [] Foam [] Super Absorbant [] ABD [] ACE Wrap            [] Other:  
 Avoid contact of tape with skin.  
 
[x] Change dressing: [] Daily    [] Every Other Day [] Once per week   [] Twice per week 
 [] Three times per week [x] Do Not Change Dressing   [] Other: 
  
Compression: 
Apply: [x] Multilayer Compression Wrap: []RightLeg [x]Left Leg 
                               []Profore  []Profore Lite             [x]Unna [x] Do not get leg(s) with wrap wet. If wraps become too tight call the center or completely remove the wrap. [x] Elevate leg(s) above the level of the heart when sitting. [x] Avoid prolonged standing in one place. Dietary: 
[x] Diet as tolerated: [] Calorie Diabetic Diet: [x] No Added Salt: 
[] Increase Protein: [] Other:  
 
Activity: 
[x] Activity as tolerated:   
[] Patient has no activity restrictions [] Strict Bedrest:  
[] Remain off Work:      
[] May return to full duty work:                                    
[] Return to work with restrictions:  
         
 
[address] Information: Should you experience any significant changes in your wound(s) or have questions about your wound care, please contact Thais Caldera at [place] [address] 8:00 am - 4:30. If you need help with your wound outside of these hours and cannot wait until we are again available, contact your PCP or go to the hospital emergency room. PLEASE NOTE: IF YOU ARE UNABLE TO OBTAIN WOUND SUPPLIES, CONTINUE TO USE THE SUPPLIES YOU HAVE AVAILABLE UNTIL YOU ARE ABLE TO REACH US. IT IS MOST IMPORTANT TO KEEP THE WOUND COVERED AT ALL TIMES. [x] Dr. Amelia Sanchez  
[] Dr. Isaac Mercado 
[] Tato Washington [facility] [] Dr. Chris Cabrera

## 2021-03-29 NOTE — WOUND CARE
[address] Progress Note and Procedure Note Lulu Flores MEDICAL RECORD NUMBER:  [id number] AGE: 76 y.o. RACE WHITE  GENDER: male  : 1955 EPISODE DATE:  3/29/2021 Subjective: No new problems Chief Complaint Patient presents with  Wound Check  
  left leg HISTORY of PRESENT ILLNESS HPI Lulu Flores is a 76 y.o. male who presents today for wound/ulcer evaluation. History of Wound Context: L leg Wound/Ulcer Pain Timing/Severity: mild Quality of pain: dull Severity:  1 / 10 Modifying Factors: Pain is relieved/improved with rest 
Associated Signs/Symptoms: edema Ulcer Identification: 
Ulcer Type: venous Contributing Factors: edema Wound: L leg PAST MEDICAL HISTORY Past Medical History:  
Diagnosis Date  Arthritis  Chronic obstructive pulmonary disease ([facility] [address]  Diabetes ([facility] [address]  Gout  Hypertension  Sleep apnea PAST SURGICAL HISTORY Past Surgical History:  
Procedure Laterality Date  HX ORTHOPAEDIC    
 HX VEIN ABLATION ADHESIVE    
 
 
FAMILY HISTORY Family History Problem Relation Age of Onset  Heart Disease Mother  Kidney Disease Mother  Hypertension Mother  Diabetes Mother  Heart Disease Father  Hypertension Father  Diabetes Sister  Diabetes Brother SOCIAL HISTORY Social History Tobacco Use  Smoking status: Never Smoker  Smokeless tobacco: Never Used Substance Use Topics  Alcohol use: Yes  Drug use: Never ALLERGIES Allergies Allergen Reactions  Elavil Unknown (comments)  Sulfa (Sulfonamide Antibiotics) Nausea and Vomiting MEDICATIONS Current Outpatient Medications on File Prior to Encounter Medication Sig Dispense Refill  bumetanide (BUMEX) 2 mg tablet Take 2 mg by mouth two (2) times a day.  multivits,Stress Formula-Zinc tablet Take 1 Tab by mouth daily.     
 vitamin e (E GEMS) 1,000 unit capsule Take 1,000 Units by mouth two (2) times a day.  multivitamin (ONE A DAY) tablet Take 1 Tab by mouth daily.  ferrous sulfate (Iron) 325 mg (65 mg iron) tablet Take  by mouth Daily (before breakfast).  aspirin 81 mg chewable tablet Take 81 mg by mouth daily.  flunisolide (NASAREL) 25 mcg (0.025 %) spry 2 Sprays two (2) times a day.  albuterol (ProAir HFA) 90 mcg/actuation inhaler Take  by inhalation.  colchicine 0.6 mg tablet Take 0.6 mg by mouth daily.  gabapentin (NEURONTIN) 300 mg capsule Take 300 mg by mouth four (4) times daily.  oxyCODONE IR (ROXICODONE) 5 mg immediate release tablet Take 5 mg by mouth every twelve (12) hours.  metFORMIN (GLUCOPHAGE) 500 mg tablet Take 1,000 mg by mouth two (2) times daily (with meals).  hydroCHLOROthiazide (HYDRODIURIL) 25 mg tablet Take 25 mg by mouth daily.  insulin lispro (HumaLOG U-100 Insulin) 100 unit/mL injection by SubCUTAneous route. Sliding scale  insulin glargine (LANTUS) 100 unit/mL injection by SubCUTAneous route nightly. 80units am/ 20units pm    
 
No current facility-administered medications on file prior to encounter. REVIEW OF SYSTEMS Pertinent items are noted in HPI. Objective:  
 
Visit Vitals /65 (BP 1 Location: Right upper arm, BP Patient Position: At rest;Sitting) Pulse 70 Temp 98 °F (36.7 °C) Resp 20 Ht 5' 9\" (1.753 m) Wt 136.1 kg (300 lb) SpO2 99% BMI 44.30 kg/m² Wt Readings from Last 3 Encounters:  
03/29/21 136.1 kg (300 lb)  
11/23/20 (!) 166.5 kg (367 lb) PHYSICAL EXAM 
Wound measures longer, sloght debrided, no infection Pertinent items are noted in HPI. Assessment:  
 no significant change in two weeks Problem List Items Addressed This Visit Idiopathic chronic venous hypertension of left lower extremity with ulcer ([address] Localized edema POP IN PROCEDURE TYPE (DEBRIDEMENT, BIOPSY, I&D, SKIN SUB, CHEMICAL CAUSINA) Plan:  
Continue endoform, unna Treatment Note please see attached Discharge Instructions Written patient dismissal instructions given to patient or POA. Electronically signed by Erica Tran MD on 3/29/2021 at 2:25 PM 
 
 
 
 
 
 
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 Performed by: Erica Tran MD 
 
Consent obtained: Yes Time out taken: Yes Debridement: Excisional 
 
Using curette the wound(s)/ulcer(s) was/were sharply debrided down through and including the removal of   
dermis and subcutaneous tissue Devitalized Tissue Debrided: slough Pre Debridement Measurements: 
Are located in the [place]  Documentation Flow Sheet Non-Pressure ulcer, fat layer exposed Wound/Ulcer #: 1 Post Debridement Measurements: 
Wound/Ulcer Descriptions are Pre Debridement except measurements: 
 
Wound Leg lower Left;Lateral #1 (Active) Wound Image   03/15/21 1342 Wound Etiology Venous 03/29/21 1354 Dressing Status Clean;Dry; Intact 03/29/21 1354 Cleansed Soap and water 03/22/21 1020 Dressing/Treatment Paulo campos 10/26/20 1002 Offloading for Diabetic Foot Ulcers No offloading required 03/22/21 1020 Wound Length (cm) 3.8 cm 03/29/21 1354 Wound Width (cm) 2 cm 03/29/21 1354 Wound Depth (cm) 0.2 cm 03/29/21 1354 Wound Surface Area (cm^2) 7.6 cm^2 03/29/21 1354 Change in Wound Size % 70.34 03/29/21 1354 Wound Volume (cm^3) 1.52 cm^3 03/29/21 1354 Wound Healing % 70 03/29/21 1354 Post-Procedure Length (cm) 3.8 cm 03/29/21 1408 Post-Procedure Width (cm) 2 cm 03/29/21 1408 Post-Procedure Depth (cm) 0.2 cm 03/29/21 1408 Post-Procedure Surface Area (cm^2) 7.6 cm^2 03/29/21 1408 Post-Procedure Volume (cm^3) 1.52 cm^3 03/29/21 1408 Wound Assessment Granulation tissue;Slough 03/29/21 1354 Drainage Amount Moderate 03/29/21 1354 Drainage Description Serosanguinous 03/29/21 1354 Wound Odor None 03/29/21 1354 Alissa-Wound/Incision Assessment Excoriated 03/29/21 1354 Edges Attached edges 03/29/21 1354 Wound Thickness Description Full thickness 03/29/21 1354 Number of days: 196 Total Surface Area Debrided:  7 sq cm Estimated Blood Loss:  Minimal  
 
Hemostasis Achieved: Pressure Procedural Pain: 2 / 10 Post Procedural Pain: 0 / 10 Response to treatment: Well tolerated by patient

## 2021-03-29 NOTE — DISCHARGE INSTRUCTIONS
Discharge Instructions  [address] [place] [address], [address] [place] [address]  Telephone: [phone]    NAME:  Sol Ernandezast OF BIRTH:  1955  MEDICAL RECORD NUMBER:  [id number]  DATE:  3/29/2021      Return Appointment:  [] Dressing supply provider:   [] Home Healthcare:    [x] Return Appointment: 1 Week(s)  [] Nurse Visit:   Week(s)    [] Discharge from [facility]  [] Ordered tests:   [] Referrals:   [] Rx:     Wound Cleansing:   Do not scrub or use excessive force. Cleanse wound prior to applying a clean dressing with:  [x] Normal Saline   [x] Mild Soap & Water    [] Keep Wound Dry in Shower  [] Other:      Topical Treatments:  Do not apply lotions, creams, or ointments to wound bed unless directed. [x] Apply moisturizing lotion to skin surrounding the wound prior to dressing change.  [] Apply antifungal ointment to skin surrounding the wound prior to dressing change.  [] Apply thin film of moisture barrier ointment to skin immediately around wound. [] Other:       Dressings:           Wound Location Left leg   [x] Apply Primary Dressing:       [] MediHoney Gel    [] MediHoney Alginate               [] Calcium Alginate      [] Calcium Alginate with Silver   [] Collagen                   [x] Collagen with Silver                [] Santyl with Moistened saline gauze              [] Hydrofera Blue (cut to size and moistened)  [] Hydrofera Blue Ready (Cut to size)   [] NS wet to dry    [] Betadine wet to dry              [] Hydrogel                [] Mepitel     [] Bactroban/Mupirocin               [] Other: Drawtex    [x] Cover and Secure with:     [x] Gauze [] Vickki Daysi [] Kerlix   [] Foam [] Super Absorbant [x] ABD     [] ACE Wrap            [] Other:    Avoid contact of tape with skin.     [x] Change dressing: [] Daily    [] Every Other Day [] Once per week   [] Twice per week   [] Three times per week [x] Do Not Change Dressing   [] Other:     Compression:  Apply: [x] Multilayer Compression Wrap: []RightLeg [x]Left Leg                                 []Profore  []Profore Lite             [x]Unna     [x] Do not get leg(s) with wrap wet. If wraps become too tight call the center or completely remove the wrap. [x] Elevate leg(s) above the level of the heart when sitting. [x] Avoid prolonged standing in one place. Dietary:  [x] Diet as tolerated: [] Calorie Diabetic Diet: [x] No Added Salt:  [] Increase Protein: [] Other:     Activity:  [x] Activity as tolerated:    [] Patient has no activity restrictions       [] Strict Bedrest:   [] Remain off Work:       [] May return to full duty work:                                     [] Return to work with restrictions:               [address] Information: Should you experience any significant changes in your wound(s) or have questions about your wound care, please contact Thais Caldera at [place] [address] 8:00 am - 4:30. If you need help with your wound outside of these hours and cannot wait until we are again available, contact your PCP or go to the hospital emergency room. PLEASE NOTE: IF YOU ARE UNABLE TO OBTAIN WOUND SUPPLIES, CONTINUE TO USE THE SUPPLIES YOU HAVE AVAILABLE UNTIL YOU ARE ABLE TO REACH US. IT IS MOST IMPORTANT TO KEEP THE WOUND COVERED AT ALL TIMES.     [x] Dr. Tess Lee   [] Dr. Brenda Ramirez  [] Moises Swartz [facility]  [] Dr. Tom Kaur

## 2021-03-29 NOTE — WOUND CARE
[employer] Below Knee NAME:  Jeannette Nixon YOB: 1955 MEDICAL RECORD NUMBER:  [id number] DATE:  3/29/2021 Remove old Unna Boot or Boots. Applied moisturizing agent to dry skin as needed. Appied primary and secondary dressing as ordered. Applied Unna roll from toes to knee overlapping each time. Applied ace wrap or coban from toes to below the knee. Secured with tape and/or metal clips covered with tape. Instructed patient/caregiver to keep dressing dry and intact. DO NOT REMOVE DRESSING. Instructed pt/family/caregiver to report excessive draining, loose bandage, wet dressing, severe pain or tingling in toes. Applied [place] dressing below the knee to left lower leg. Unna Boot(s) were applied per  Guidelines. Response to treatment: Well tolerated by patient  Electronically signed by Kojo Kwok LPN on 0/92/0392 at 5:13 PM

## 2021-04-05 ENCOUNTER — HOSPITAL ENCOUNTER (OUTPATIENT)
Dept: WOUND CARE | Age: 66
Discharge: HOME | End: 2021-04-05
Admitting: SPECIALIST
Payer: MEDICARE

## 2021-04-05 VITALS
OXYGEN SATURATION: 99 % | TEMPERATURE: 97.8 F | RESPIRATION RATE: 20 BRPM | DIASTOLIC BLOOD PRESSURE: 78 MMHG | SYSTOLIC BLOOD PRESSURE: 137 MMHG

## 2021-04-05 PROCEDURE — 11042 DBRDMT SUBQ TIS 1ST 20SQCM/<: CPT

## 2021-04-05 RX ORDER — LIDOCAINE HYDROCHLORIDE 20 MG/ML
15 SOLUTION OROPHARYNGEAL AS NEEDED
Status: DISCONTINUED | OUTPATIENT
Start: 2021-04-05 | End: 2021-04-07 | Stop reason: HOSPADM

## 2021-04-05 NOTE — DISCHARGE INSTRUCTIONS
277 [facility], [address] [place] [place] [address]  Telephone: [phone]    NAME:  Iam Reyez OF BIRTH:  1955  MEDICAL RECORD NUMBER:  [id number] 697 [facility], [address] [place] [place] [address]  Telephone: [phone]    NAME:  Iam Reyez OF BIRTH:  1955  MEDICAL RECORD NUMBER:  [id number]  DATE: 04/05/2021       Return Appointment:  [] Dressing supply provider:   [] Home Healthcare:    [x] Return Appointment: 2 Week(s)  [x] Nurse Visit:  1 Week(s)    [] Discharge from [facility]  [] Ordered tests:   [] Referrals:   [] Rx:     Wound Cleansing:   Do not scrub or use excessive force. Cleanse wound prior to applying a clean dressing with:  [x] Normal Saline   [x] Mild Soap & Water    [] Keep Wound Dry in Shower  [] Other:      Topical Treatments:  Do not apply lotions, creams, or ointments to wound bed unless directed. [x] Apply moisturizing lotion to skin surrounding the wound prior to dressing change.  [] Apply antifungal ointment to skin surrounding the wound prior to dressing change.  [] Apply thin film of moisture barrier ointment to skin immediately around wound.   [] Other:       Dressings:           Wound Location Left leg   [x] Apply Primary Dressing:       [] MediHoney Gel    [] MediHoney Alginate               [] Calcium Alginate      [] Calcium Alginate with Silver   [] Collagen                   [] Collagen with Silver                [] Santyl with Moistened saline gauze              [] Hydrofera Blue (cut to size and moistened)  [] Hydrofera Blue Ready (Cut to size)   [] NS wet to dry    [] Betadine wet to dry              [] Hydrogel                [] Mepitel     [] Bactroban/Mupirocin               [x] Other: Endoform and Drawtex    [x] Cover and Secure with:     [x] Gauze [] Ellij[place] Pa [] Kerlix   [] Foam [] Super Absorbant [] ABD     [] ACE Wrap [] Other:    Avoid contact of tape with skin. [x] Change dressing: [] Daily    [] Every Other Day [] Once per week   [] Twice per week   [] Three times per week [x] Do Not Change Dressing   [] Other:     Compression:  Apply: [x] Multilayer Compression Wrap: []RightLeg [x]Left Leg                                 []Profore  []Profore Lite             [x]Unna     [x] Do not get leg(s) with wrap wet. If wraps become too tight call the center or completely remove the wrap. [x] Elevate leg(s) above the level of the heart when sitting. [x] Avoid prolonged standing in one place. Dietary:  [x] Diet as tolerated: [] Calorie Diabetic Diet: [x] No Added Salt:  [] Increase Protein: [] Other:     Activity:  [x] Activity as tolerated:    [] Patient has no activity restrictions       [] Strict Bedrest:   [] Remain off Work:       [] May return to full duty work:                                     [] Return to work with restrictions:               [address] Information: Should you experience any significant changes in your wound(s) or have questions about your wound care, please contact Thais Caldera at [place] [address] 8:00 am - 4:30. If you need help with your wound outside of these hours and cannot wait until we are again available, contact your PCP or go to the hospital emergency room. PLEASE NOTE: IF YOU ARE UNABLE TO OBTAIN WOUND SUPPLIES, CONTINUE TO USE THE SUPPLIES YOU HAVE AVAILABLE UNTIL YOU ARE ABLE TO REACH US. IT IS MOST IMPORTANT TO KEEP THE WOUND COVERED AT ALL TIMES. [x] Dr. Nixon Rosas   [] Dr. Jun Acuna  [] [facility]  [] Dr. Castorena Core  DATE:04/05/2021      Return Appointment:  [] Dressing supply provider:   [] Home Healthcare:    [x] Return Appointment: 2 Week(s)  [x] Nurse Visit: 1  Week(s)    [] Discharge from [facility]  [] Ordered tests:   [] Referrals:   [] Rx:     Wound Cleansing:   Do not scrub or use excessive force.   Cleanse wound prior to applying a clean dressing with:  [x] Normal Saline   [x] Mild Soap & Water    [] Keep Wound Dry in Shower  [] Other:      Topical Treatments:  Do not apply lotions, creams, or ointments to wound bed unless directed. [x] Apply moisturizing lotion to skin surrounding the wound prior to dressing change.  [] Apply antifungal ointment to skin surrounding the wound prior to dressing change.  [] Apply thin film of moisture barrier ointment to skin immediately around wound. [] Other:       Dressings:           Wound Location Left leg   [x] Apply Primary Dressing:       [] MediHoney Gel    [] MediHoney Alginate               [] Calcium Alginate      [] Calcium Alginate with Silver   [] Collagen                   [] Collagen with Silver                [] Santyl with Moistened saline gauze              [] Hydrofera Blue (cut to size and moistened)  [] Hydrofera Blue Ready (Cut to size)   [] NS wet to dry    [] Betadine wet to dry              [] Hydrogel                [] Mepitel     [] Bactroban/Mupirocin               [x] Other:  Endoform Drawtex    [x] Cover and Secure with:     [x] Gauze [] Evelia Lopez [] Kerlix   [] Foam [] Super Absorbant [] ABD     [] ACE Wrap            [] Other:    Avoid contact of tape with skin. [x] Change dressing: [] Daily    [] Every Other Day [] Once per week   [] Twice per week   [] Three times per week [x] Do Not Change Dressing   [] Other:     Compression:  Apply: [x] Multilayer Compression Wrap: []RightLeg [x]Left Leg                                 []Profore  []Profore Lite             [x]Unna     [x] Do not get leg(s) with wrap wet. If wraps become too tight call the center or completely remove the wrap. [x] Elevate leg(s) above the level of the heart when sitting. [x] Avoid prolonged standing in one place.     Dietary:  [x] Diet as tolerated: [] Calorie Diabetic Diet: [x] No Added Salt:  [] Increase Protein: [] Other:     Activity:  [x] Activity as tolerated:    [] Patient has no activity restrictions       [] Strict Bedrest:   [] Remain off Work:       [] May return to full duty work:                                     [] Return to work with restrictions:               [address] Information: Should you experience any significant changes in your wound(s) or have questions about your wound care, please contact Thais Caldera at [place] [address] 8:00 am - 4:30. If you need help with your wound outside of these hours and cannot wait until we are again available, contact your PCP or go to the hospital emergency room. PLEASE NOTE: IF YOU ARE UNABLE TO OBTAIN WOUND SUPPLIES, CONTINUE TO USE THE SUPPLIES YOU HAVE AVAILABLE UNTIL YOU ARE ABLE TO REACH US. IT IS MOST IMPORTANT TO KEEP THE WOUND COVERED AT ALL TIMES.     [x] Dr. Shaffer Or   [] Dr. Lyle Cedillo  [] [facility]  [] Dr. Joyce Miner

## 2021-04-05 NOTE — WOUND CARE
[address] Progress Note and Procedure Note Araseli Hays MEDICAL RECORD NUMBER:  [id number] AGE: 76 y.o. RACE WHITE  GENDER: male  : 1955 EPISODE DATE:  2021 Subjective: No new problems reported, no fever, no increased pain or drainage Chief Complaint Patient presents with  Wound Check L lower leg HISTORY of PRESENT ILLNESS HPI Araseli Hays is a 76 y.o. male who presents today for wound/ulcer evaluation. History of Wound Context: L leg Wound/Ulcer Pain Timing/Severity: mild Quality of pain: dull Severity:  1 / 10 Modifying Factors: Pain is relieved/improved with rest 
Associated Signs/Symptoms: edema Ulcer Identification: 
Ulcer Type: venous Contributing Factors: lymphedema Wound: L leg PAST MEDICAL HISTORY Past Medical History:  
Diagnosis Date  Arthritis  Chronic obstructive pulmonary disease ([facility] [facility]  Diabetes ([facility] [facility]  Gout  Hypertension  Sleep apnea PAST SURGICAL HISTORY Past Surgical History:  
Procedure Laterality Date  HX ORTHOPAEDIC    
 HX VEIN ABLATION ADHESIVE    
 
 
FAMILY HISTORY Family History Problem Relation Age of Onset  Heart Disease Mother  Kidney Disease Mother  Hypertension Mother  Diabetes Mother  Heart Disease Father  Hypertension Father  Diabetes Sister  Diabetes Brother SOCIAL HISTORY Social History Tobacco Use  Smoking status: Never Smoker  Smokeless tobacco: Never Used Substance Use Topics  Alcohol use: Yes  Drug use: Never ALLERGIES Allergies Allergen Reactions  Elavil Unknown (comments)  Sulfa (Sulfonamide Antibiotics) Nausea and Vomiting MEDICATIONS Current Outpatient Medications on File Prior to Encounter Medication Sig Dispense Refill  bumetanide (BUMEX) 2 mg tablet Take 2 mg by mouth two (2) times a day.     
 multivits,Stress Formula-Zinc tablet Take 1 Tab by mouth daily.  vitamin e (E GEMS) 1,000 unit capsule Take 1,000 Units by mouth two (2) times a day.  multivitamin (ONE A DAY) tablet Take 1 Tab by mouth daily.  ferrous sulfate (Iron) 325 mg (65 mg iron) tablet Take  by mouth Daily (before breakfast).  aspirin 81 mg chewable tablet Take 81 mg by mouth daily.  flunisolide (NASAREL) 25 mcg (0.025 %) spry 2 Sprays two (2) times a day.  albuterol (ProAir HFA) 90 mcg/actuation inhaler Take  by inhalation.  colchicine 0.6 mg tablet Take 0.6 mg by mouth daily.  gabapentin (NEURONTIN) 300 mg capsule Take 300 mg by mouth four (4) times daily.  oxyCODONE IR (ROXICODONE) 5 mg immediate release tablet Take 5 mg by mouth every twelve (12) hours.  metFORMIN (GLUCOPHAGE) 500 mg tablet Take 1,000 mg by mouth two (2) times daily (with meals).  hydroCHLOROthiazide (HYDRODIURIL) 25 mg tablet Take 25 mg by mouth daily.  insulin lispro (HumaLOG U-100 Insulin) 100 unit/mL injection by SubCUTAneous route. Sliding scale  insulin glargine (LANTUS) 100 unit/mL injection by SubCUTAneous route nightly. 80units am/ 20units pm    
 
No current facility-administered medications on file prior to encounter. REVIEW OF SYSTEMS Pertinent items are noted in HPI. Objective:  
 
Visit Vitals /78 (BP 1 Location: Right upper arm, BP Patient Position: At rest;Sitting) Temp 97.8 °F (36.6 °C) Resp 20 SpO2 99% Wt Readings from Last 3 Encounters:  
03/29/21 136.1 kg (300 lb)  
11/23/20 (!) 166.5 kg (367 lb) PHYSICAL EXAM 
Wound measurement: 
Today: 3.5 x 2.0 x 0.3 3/15:  2.7 x 1.9 x 0.2 Pertinent items are noted in HPI. Assessment:  
 no significant change Problem List Items Addressed This Visit None POP IN PROCEDURE TYPE (DEBRIDEMENT, BIOPSY, I&D, SKIN SUB, CHEMICAL CAUERTY) Plan:  
Continue Endoform + Unna boot Treatment Note please see attached Discharge Instructions Written patient dismissal instructions given to patient or POA. Electronically signed by Moises Gao MD on 4/5/2021 at 1:41 PM 
 
 
 
 
 
 
Debridement Wound Care Problem List Items Addressed This Visit None Procedure Note Indications:  Based on my examination of this patient's wound(s)/ulcer(s) today, debridement is required to promote healing and evaluate the wound base. Performed by: Moises Gao MD 
 
Consent obtained: Yes Time out taken: Yes Debridement: Excisional 
 
Using curette the wound(s)/ulcer(s) was/were sharply debrided down through and including the removal of   
dermis and subcutaneous tissue Devitalized Tissue Debrided: slough Pre Debridement Measurements: 
Are located in the [place]  Documentation Flow Sheet Non-Pressure ulcer, fat layer exposed Wound/Ulcer #: 1 Post Debridement Measurements: 
Wound/Ulcer Descriptions are Pre Debridement except measurements: 
 
Wound Leg lower Left;Lateral #1 (Active) Wound Image   04/05/21 1322 Wound Etiology Venous 04/05/21 1322 Dressing Status Breakthrough drainage noted 04/05/21 1322 Cleansed Soap and water 04/05/21 1322 Dressing/Treatment Faye campos 10/26/20 1002 Offloading for Diabetic Foot Ulcers No 04/05/21 1322 Wound Length (cm) 3.5 cm 04/05/21 1322 Wound Width (cm) 2 cm 04/05/21 1322 Wound Depth (cm) 0.3 cm 04/05/21 1322 Wound Surface Area (cm^2) 7 cm^2 04/05/21 1322 Change in Wound Size % 72.68 04/05/21 1322 Wound Volume (cm^3) 2.1 cm^3 04/05/21 1322 Wound Healing % 59 04/05/21 1322 Post-Procedure Length (cm) 3.5 cm 04/05/21 1329 Post-Procedure Width (cm) 2 cm 04/05/21 1329 Post-Procedure Depth (cm) 0.3 cm 04/05/21 1329 Post-Procedure Surface Area (cm^2) 7 cm^2 04/05/21 1329 Post-Procedure Volume (cm^3) 2.1 cm^3 04/05/21 1329 Wound Assessment Granulation tissue;Slough; Subcutaneous 04/05/21 1322 Drainage Amount Large 04/05/21 1322  
Drainage Description Serosanguinous 04/05/21 1322 Wound Odor None 04/05/21 1322 Alissa-Wound/Incision Assessment Hyperpigmented;Edematous 04/05/21 1322 Edges Attached edges 04/05/21 1322 Wound Thickness Description Full thickness 04/05/21 1322 Number of days: 203 Total Surface Area Debrided:  7 sq cm Estimated Blood Loss:  Minimal  
 
Hemostasis Achieved: Pressure Procedural Pain: 6 / 10 Post Procedural Pain: 2 / 10 Response to treatment: Well tolerated by patient

## 2021-04-05 NOTE — WOUND CARE
[employer] Below Knee NAME:  Yg Rosas YOB: 1955 MEDICAL RECORD NUMBER:  [id number] DATE:  4/5/2021 Remove old Unna Boot or Boots. Applied moisturizing agent to dry skin as needed. Appied primary and secondary dressing as ordered. Applied Unna roll from toes to knee overlapping each time. Applied ace wrap or coban from toes to below the knee. Secured with tape and/or metal clips covered with tape. Instructed patient/caregiver to keep dressing dry and intact. DO NOT REMOVE DRESSING. Instructed pt/family/caregiver to report excessive draining, loose bandage, wet dressing, severe pain or tingling in toes. Applied [place] dressing below the knee to left lower leg. Unna Boot(s) were applied per  Guidelines. Response to treatment: Well tolerated by patient  Electronically signed by Mounika Bullard RN on 4/5/2021 at 2:27 PM

## 2021-04-12 ENCOUNTER — HOSPITAL ENCOUNTER (OUTPATIENT)
Dept: WOUND CARE | Age: 66
Discharge: HOME | End: 2021-04-12
Payer: MEDICARE

## 2021-04-12 VITALS
HEART RATE: 69 BPM | SYSTOLIC BLOOD PRESSURE: 155 MMHG | DIASTOLIC BLOOD PRESSURE: 91 MMHG | RESPIRATION RATE: 20 BRPM | TEMPERATURE: 98.2 F

## 2021-04-12 PROCEDURE — 29580 STRAPPING UNNA BOOT: CPT

## 2021-04-12 NOTE — WOUND CARE
[employer] Below Knee NAME:  Gracia Sneed YOB: 1955 MEDICAL RECORD NUMBER:  [id number] DATE:  4/12/2021 Remove old Unna Boot or Boots. Applied moisturizing agent to dry skin as needed. Appied primary and secondary dressing as ordered. Applied Unna roll from toes to knee overlapping each time. Applied ace wrap or coban from toes to below the knee. Secured with tape and/or metal clips covered with tape. Instructed patient/caregiver to keep dressing dry and intact. DO NOT REMOVE DRESSING. Instructed pt/family/caregiver to report excessive draining, loose bandage, wet dressing, severe pain or tingling in toes. Applied [place] dressing below the knee to right lower leg. Unna Boot(s) were applied per  Guidelines. Response to treatment: Well tolerated by patient  Electronically signed by Seema Khan RN on 4/12/2021 at 10:18 AM

## 2021-04-19 ENCOUNTER — HOSPITAL ENCOUNTER (OUTPATIENT)
Dept: WOUND CARE | Age: 66
Discharge: HOME | End: 2021-04-19
Admitting: SPECIALIST
Payer: MEDICARE

## 2021-04-19 VITALS
HEART RATE: 92 BPM | OXYGEN SATURATION: 99 % | RESPIRATION RATE: 20 BRPM | DIASTOLIC BLOOD PRESSURE: 84 MMHG | SYSTOLIC BLOOD PRESSURE: 143 MMHG | TEMPERATURE: 97.9 F

## 2021-04-19 DIAGNOSIS — R60.0 LOCALIZED EDEMA: ICD-10-CM

## 2021-04-19 DIAGNOSIS — L97.929 IDIOPATHIC CHRONIC VENOUS HYPERTENSION OF LEFT LOWER EXTREMITY WITH ULCER (HCC): ICD-10-CM

## 2021-04-19 DIAGNOSIS — I87.312 IDIOPATHIC CHRONIC VENOUS HYPERTENSION OF LEFT LOWER EXTREMITY WITH ULCER (HCC): ICD-10-CM

## 2021-04-19 PROCEDURE — 87205 SMEAR GRAM STAIN: CPT

## 2021-04-19 PROCEDURE — 87077 CULTURE AEROBIC IDENTIFY: CPT

## 2021-04-19 PROCEDURE — 11042 DBRDMT SUBQ TIS 1ST 20SQCM/<: CPT

## 2021-04-19 PROCEDURE — 87186 SC STD MICRODIL/AGAR DIL: CPT

## 2021-04-19 PROCEDURE — 11045 DBRDMT SUBQ TISS EACH ADDL: CPT

## 2021-04-19 RX ORDER — LIDOCAINE HYDROCHLORIDE 20 MG/ML
15 SOLUTION OROPHARYNGEAL AS NEEDED
Status: DISCONTINUED | OUTPATIENT
Start: 2021-04-19 | End: 2021-04-21 | Stop reason: HOSPADM

## 2021-04-19 RX ORDER — CLINDAMYCIN HYDROCHLORIDE 300 MG/1
600 CAPSULE ORAL 3 TIMES DAILY
Qty: 42 CAP | Refills: 0 | Status: SHIPPED | OUTPATIENT
Start: 2021-04-19 | End: 2021-04-26

## 2021-04-19 RX ORDER — CLINDAMYCIN HYDROCHLORIDE 150 MG/1
600 CAPSULE ORAL EVERY 8 HOURS
Status: DISCONTINUED | OUTPATIENT
Start: 2021-04-19 | End: 2021-04-19 | Stop reason: CLARIF

## 2021-04-19 RX ORDER — CLINDAMYCIN HYDROCHLORIDE 300 MG/1
600 CAPSULE ORAL 3 TIMES DAILY
Qty: 42 CAP | Refills: 1 | Status: SHIPPED | OUTPATIENT
Start: 2021-04-19 | End: 2021-04-19 | Stop reason: SDUPTHER

## 2021-04-19 NOTE — WOUND CARE
[employer] Below Knee NAME:  Ocie Gitelman YOB: 1955 MEDICAL RECORD NUMBER:  [id number] DATE:  4/19/2021 Remove old Unna Boot or Boots. Applied moisturizing agent to dry skin as needed. Appied primary and secondary dressing as ordered. Applied Unna roll from toes to knee overlapping each time. Applied ace wrap or coban from toes to below the knee. Secured with tape and/or metal clips covered with tape. Instructed patient/caregiver to keep dressing dry and intact. DO NOT REMOVE DRESSING. Instructed pt/family/caregiver to report excessive draining, loose bandage, wet dressing, severe pain or tingling in toes. Applied [place] dressing below the knee to left lower leg. Unna Boot(s) were applied per  Guidelines. Response to treatment: Well tolerated by patient  Electronically signed by Na Valles RN on 4/19/2021 at 2:27 PM

## 2021-04-19 NOTE — WOUND CARE
Discharge Instructions [address] Telephone: [phone] NAME:  Gracia Sneed YOB: 1955 MEDICAL RECORD NUMBER:  [id number] DATE:  4/19/2021 Return Appointment: 
[] Dressing supply provider:  
[] Home Healthcare:   
[x] Return Appointment: 1 Week(s) 
[] Nurse Visit:   Week(s) 
 
[] Discharge from [facility] [] Ordered tests:  
[] Referrals:  
[] Rx:  
 
Wound Cleansing: Do not scrub or use excessive force. Cleanse wound prior to applying a clean dressing with: 
[x] Normal Saline  
[x] Mild Soap & Water   
[] Keep Wound Dry in Shower 
[] Other:   
 
Topical Treatments: Do not apply lotions, creams, or ointments to wound bed unless directed. [x] Apply moisturizing lotion to skin surrounding the wound prior to dressing change. 
[] Apply antifungal ointment to skin surrounding the wound prior to dressing change. 
[] Apply thin film of moisture barrier ointment to skin immediately around wound. [] Other:   
  
Dressings:           Wound Location Left leg [x] Apply Primary Dressing:     
 [] MediHoney Gel    [] MediHoney Alginate  
            [] Calcium Alginate      [x] Calcium Alginate with Silver 
 [] Collagen                   [] Collagen with Silver   
            [] Santyl with Moistened saline gauze 
            [] Hydrofera Blue (cut to size and moistened)  [] Hydrofera Blue Ready (Cut to size) [] NS wet to dry    [] Betadine wet to dry 
            [] Hydrogel                [] Mepitel   
 [] Bactroban/Mupirocin  
            [] Other: Drawtex [x] Cover and Secure with:   
 [x] Gauze [] Cristian [] Kerlix [] Foam [x] Super Absorbant [] ABD [] ACE Wrap            [] Other:  
 Avoid contact of tape with skin. [x] Change dressing: [] Daily    [] Every Other Day [] Once per week   [] Twice per week 
 [] Three times per week [x] Do Not Change Dressing   [] Other: Compression: 
Apply: [x] Multilayer Compression Wrap: []RightLeg [x]Left Leg 
                               []Profore  []Profore Lite             [x]Unna [x] Do not get leg(s) with wrap wet. If wraps become too tight call the center or completely remove the wrap. [x] Elevate leg(s) above the level of the heart when sitting. [x] Avoid prolonged standing in one place. Dietary: 
[x] Diet as tolerated: [] Calorie Diabetic Diet: [x] No Added Salt: 
[] Increase Protein: [] Other:  
 
Activity: 
[x] Activity as tolerated:   
[] Patient has no activity restrictions [] Strict Bedrest:  
[] Remain off Work:      
[] May return to full duty work:                                    
[] Return to work with restrictions:  
         
 
[address] Information: Should you experience any significant changes in your wound(s) or have questions about your wound care, please contact Thais Caldera at [place] [address] 8:00 am - 4:30. If you need help with your wound outside of these hours and cannot wait until we are again available, contact your PCP or go to the hospital emergency room. PLEASE NOTE: IF YOU ARE UNABLE TO OBTAIN WOUND SUPPLIES, CONTINUE TO USE THE SUPPLIES YOU HAVE AVAILABLE UNTIL YOU ARE ABLE TO REACH US. IT IS MOST IMPORTANT TO KEEP THE WOUND COVERED AT ALL TIMES. [x] Dr. Izabela Dallas  
[] Dr. Frias 
[] Cece Resendez [facility] [] Dr. Frances Gaspar

## 2021-04-19 NOTE — WOUND CARE
[address] Progress Note and Procedure Note Noy Regan MEDICAL RECORD NUMBER:  [id number] AGE: 76 y.o. RACE WHITE  GENDER: male  : 1955 EPISODE DATE:  2021 Subjective:  
Patient c/o increasing pain in the left leg, primarily in the left lateral calf wound. He is concerned about skin discoloration noted the past has been consistent with the onset of cellulitis. He has had no fever. Chief Complaint Patient presents with  Wound Check  
  left leg HISTORY of PRESENT ILLNESS HPI Noy Regan is a 76 y.o. male who presents today for wound/ulcer evaluation. History of Wound Context: L leg Wound/Ulcer Pain Timing/Severity: moderate Quality of pain: aching, burning and throbbing Severity:  6  10 Modifying Factors: None Associated Signs/Symptoms: edema Ulcer Identification: 
Ulcer Type: venous Contributing Factors: lymphedema Wound: L leg PAST MEDICAL HISTORY Past Medical History:  
Diagnosis Date  Arthritis  Chronic obstructive pulmonary disease ([address]  Diabetes ([facility] [address]  Gout  Hypertension  Sleep apnea PAST SURGICAL HISTORY Past Surgical History:  
Procedure Laterality Date  HX ORTHOPAEDIC    
 HX VEIN ABLATION ADHESIVE    
 
 
FAMILY HISTORY Family History Problem Relation Age of Onset  Heart Disease Mother  Kidney Disease Mother  Hypertension Mother  Diabetes Mother  Heart Disease Father  Hypertension Father  Diabetes Sister  Diabetes Brother SOCIAL HISTORY Social History Tobacco Use  Smoking status: Never Smoker  Smokeless tobacco: Never Used Substance Use Topics  Alcohol use: Yes  Drug use: Never ALLERGIES Allergies Allergen Reactions  Elavil Unknown (comments)  Sulfa (Sulfonamide Antibiotics) Nausea and Vomiting MEDICATIONS Current Outpatient Medications on File Prior to Encounter Medication Sig Dispense Refill  bumetanide (BUMEX) 2 mg tablet Take 2 mg by mouth two (2) times a day.  multivits,Stress Formula-Zinc tablet Take 1 Tab by mouth daily.  vitamin e (E GEMS) 1,000 unit capsule Take 1,000 Units by mouth two (2) times a day.  multivitamin (ONE A DAY) tablet Take 1 Tab by mouth daily.  ferrous sulfate (Iron) 325 mg (65 mg iron) tablet Take  by mouth Daily (before breakfast).  aspirin 81 mg chewable tablet Take 81 mg by mouth daily.  flunisolide (NASAREL) 25 mcg (0.025 %) spry 2 Sprays two (2) times a day.  albuterol (ProAir HFA) 90 mcg/actuation inhaler Take  by inhalation.  colchicine 0.6 mg tablet Take 0.6 mg by mouth daily.  gabapentin (NEURONTIN) 300 mg capsule Take 300 mg by mouth four (4) times daily.  oxyCODONE IR (ROXICODONE) 5 mg immediate release tablet Take 5 mg by mouth every twelve (12) hours.  metFORMIN (GLUCOPHAGE) 500 mg tablet Take 1,000 mg by mouth two (2) times daily (with meals).  hydroCHLOROthiazide (HYDRODIURIL) 25 mg tablet Take 25 mg by mouth daily.  insulin lispro (HumaLOG U-100 Insulin) 100 unit/mL injection by SubCUTAneous route. Sliding scale  insulin glargine (LANTUS) 100 unit/mL injection by SubCUTAneous route nightly. 80units am/ 20units pm    
 
No current facility-administered medications on file prior to encounter. REVIEW OF SYSTEMS Pertinent items are noted in HPI. Objective:  
 
Visit Vitals BP (!) 143/84 (BP 1 Location: Right arm, BP Patient Position: At rest;Sitting) Pulse 92 Temp 97.9 °F (36.6 °C) Resp 20 SpO2 99% Comment: on 3 L of oxygen Wt Readings from Last 3 Encounters:  
03/29/21 136.1 kg (300 lb)  
11/23/20 (!) 166.5 kg (367 lb) PHYSICAL EXAM 
L leg is edematous, not warm, discoloration not suggestive of cellulitis Wound larger and deeper, culture taken Pertinent items are noted in HPI. Assessment:  
 wound worse since last visit, unclear reason Problem List Items Addressed This Visit Idiopathic chronic venous hypertension of left lower extremity with ulcer ([address] Relevant Medications  
 clindamycin (CLEOCIN) 300 mg capsule Localized edema POP IN PROCEDURE TYPE (DEBRIDEMENT, BIOPSY, I&D, SKIN SUB, CHEMICAL CAUERTY) Plan:  
Start clindamycin for possible cellulitis Check wound culture Stop endoform, start silvercel Unna boot compression Elevate legs Treatment Note please see attached Discharge Instructions Written patient dismissal instructions given to patient or POA. Electronically signed by Jigar Dyson MD on 4/19/2021 at 2:41 PM 
 
 
 
 
 
 
Debridement Wound Care Problem List Items Addressed This Visit Idiopathic chronic venous hypertension of left lower extremity with ulcer ([facility] [address] Relevant Medications  
 clindamycin (CLEOCIN) 300 mg capsule Localized edema Procedure Note Indications:  Based on my examination of this patient's wound(s)/ulcer(s) today, debridement is required to promote healing and evaluate the wound base. Performed by: Jigar Dyson MD 
 
Consent obtained: Yes Time out taken: Yes Debridement: Excisional 
 
Using curette the wound(s)/ulcer(s) was/were sharply debrided down through and including the removal of   
dermis and subcutaneous tissue Devitalized Tissue Debrided: slough Pre Debridement Measurements: 
Are located in the [place]  Documentation Flow Sheet Non-Pressure ulcer, fat layer exposed Wound/Ulcer #: 1 Post Debridement Measurements: 
Wound/Ulcer Descriptions are Pre Debridement except measurements: 
 
Wound Leg lower Left;Lateral #1 (Active) Wound Image   04/19/21 1401 Wound Etiology Venous 04/19/21 1401 Dressing Status Clean;Dry; Intact 04/19/21 1401 Cleansed Soap and water 04/19/21 1401 Dressing/Treatment Leigh Lean boot 10/26/20 1002 Offloading for Diabetic Foot Ulcers No offloading required 04/19/21 1401 Wound Length (cm) 9.8 cm 04/19/21 1401 Wound Width (cm) 11 cm 04/19/21 1401 Wound Depth (cm) 0.2 cm 04/19/21 1401 Wound Surface Area (cm^2) 107.8 cm^2 04/19/21 1401 Change in Wound Size % -320.77 04/19/21 1401 Wound Volume (cm^3) 21.56 cm^3 04/19/21 1401 Wound Healing % -321 04/19/21 1401 Post-Procedure Length (cm) 9.8 cm 04/19/21 1413 Post-Procedure Width (cm) 11 cm 04/19/21 1413 Post-Procedure Depth (cm) 0.2 cm 04/19/21 1413 Post-Procedure Surface Area (cm^2) 107.8 cm^2 04/19/21 1413 Post-Procedure Volume (cm^3) 21.56 cm^3 04/19/21 1413 Wound Assessment Granulation tissue;Slough 04/19/21 1401 Drainage Amount Large 04/19/21 1401 Drainage Description Serosanguinous 04/19/21 1401 Wound Odor None 04/19/21 1401 Alissa-Wound/Incision Assessment Maceration 04/19/21 1401 Edges Attached edges 04/19/21 1401 Wound Thickness Description Full thickness 04/19/21 1401 Number of days: 217 Total Surface Area Debrided:  54 sq cm Estimated Blood Loss:  Minimal  
 
Hemostasis Achieved: Pressure Procedural Pain: 6 / 10 Post Procedural Pain: 2 / 10 Response to treatment: Well tolerated by patient

## 2021-04-19 NOTE — DISCHARGE INSTRUCTIONS
Discharge Instructions  [address] [place] [address], [address] [place] [address]  Telephone: [phone]    NAME:  Samson Bass OF BIRTH:  1955  MEDICAL RECORD NUMBER:  [id number]  DATE:  4/19/2021      Return Appointment:  [] Dressing supply provider:   [] Home Healthcare:    [x] Return Appointment: 1 Week(s)  [] Nurse Visit:   [facility])    [] [facility]charge from [facility]  [] Ordered tests:   [] Referrals:   [x] Rx: Clindamycin to pharmacy. Culture obtained in clinic    Wound Cleansing:   Do not scrub or use excessive force. Cleanse wound prior to applying a clean dressing with:  [x] Normal Saline   [x] Mild Soap & Water    [] Keep Wound Dry in Shower  [] Other:      Topical Treatments:  Do not apply lotions, creams, or ointments to wound bed unless directed. [x] Apply moisturizing lotion to skin surrounding the wound prior to dressing change.  [] Apply antifungal ointment to skin surrounding the wound prior to dressing change.  [] Apply thin film of moisture barrier ointment to skin immediately around wound. [] Other:       Dressings:           Wound Location Left leg   [x] Apply Primary Dressing:       [] MediHoney Gel    [] MediHoney Alginate               [] Calcium Alginate      [x] Calcium Alginate with Silver   [] Collagen                   [] Collagen with Silver                [] Santyl with Moistened saline gauze              [] Hydrofera Blue (cut to size and moistened)  [] Hydrofera Blue Ready (Cut to size)   [] NS wet to dry    [] Betadine wet to dry              [] Hydrogel                [] Mepitel     [] Bactroban/Mupirocin               [] Other: Drawtex    [x] Cover and Secure with:     [x] Gauze [] Lorena Santillan [] Kerlix   [] Foam [x] Super Absorbant [] ABD     [] ACE Wrap            [] Other:    Avoid contact of tape with skin.     [x] Change dressing: [] Daily    [] Every Other Day [] Once per week   [] Twice per week   [] Three times per week [x] Do Not Change Dressing   [] Other:     Compression:  Apply: [x] Multilayer Compression Wrap: []RightLeg [x]Left Leg                                 []Profore  []Profore Lite             [x]Unna     [x] Do not get leg(s) with wrap wet. If wraps become too tight call the center or completely remove the wrap. [x] Elevate leg(s) above the level of the heart when sitting. [x] Avoid prolonged standing in one place. Dietary:  [x] Diet as tolerated: [] Calorie Diabetic Diet: [x] No Added Salt:  [] Increase Protein: [] Other:     Activity:  [x] Activity as tolerated:    [] Patient has no activity restrictions       [] Strict Bedrest:   [] Remain off Work:       [] May return to full duty work:                                     [] Return to work with restrictions:               [address] [facility] Information: Should you experience any significant changes in your wound(s) or have questions about your wound care, please contact Thais Caldera at [place] [address] 8:00 am - 4:30. If you need help with your wound outside of these hours and cannot wait until we are again available, contact your PCP or go to the hospital emergency room. PLEASE NOTE: IF YOU ARE UNABLE TO OBTAIN WOUND SUPPLIES, CONTINUE TO USE THE SUPPLIES YOU HAVE AVAILABLE UNTIL YOU ARE ABLE TO REACH US. IT IS MOST IMPORTANT TO KEEP THE WOUND COVERED AT ALL TIMES.     [x] Dr. Golden Ansari   [] Dr. Portia Khan  [] [facility]  [] Dr. Truman Marvin

## 2021-04-23 LAB
BACTERIA SPEC CULT: NORMAL
BACTERIA SPEC CULT: NORMAL
GRAM STN SPEC: NORMAL
SPECIAL REQUESTS,SREQ: NORMAL

## 2021-04-23 RX ORDER — AMPICILLIN 500 MG/1
500 CAPSULE ORAL
Qty: 40 CAP | Refills: 0 | Status: SHIPPED | OUTPATIENT
Start: 2021-04-23 | End: 2021-04-23 | Stop reason: SDUPTHER

## 2021-04-23 RX ORDER — AMPICILLIN 500 MG/1
500 CAPSULE ORAL
Qty: 40 CAP | Refills: 0 | Status: SHIPPED | OUTPATIENT
Start: 2021-04-23 | End: 2021-06-01 | Stop reason: ALTCHOICE

## 2021-04-23 NOTE — WOUND CARE
Message left for patient to inform of positive wound infection and antibiotics e-prescribed to pharmacy by Dr. Caroline Carter. Patient instructed to stop taking the clindamycin and begin taking the Ampicillin prescribed today.

## 2021-04-26 ENCOUNTER — HOSPITAL ENCOUNTER (OUTPATIENT)
Dept: WOUND CARE | Age: 66
Discharge: HOME | End: 2021-04-26
Admitting: SPECIALIST
Payer: MEDICARE

## 2021-04-26 VITALS
TEMPERATURE: 98.3 F | OXYGEN SATURATION: 99 % | DIASTOLIC BLOOD PRESSURE: 57 MMHG | HEART RATE: 89 BPM | RESPIRATION RATE: 20 BRPM | SYSTOLIC BLOOD PRESSURE: 110 MMHG

## 2021-04-26 PROCEDURE — 11045 DBRDMT SUBQ TISS EACH ADDL: CPT

## 2021-04-26 PROCEDURE — 11042 DBRDMT SUBQ TIS 1ST 20SQCM/<: CPT

## 2021-04-26 RX ORDER — LIDOCAINE HYDROCHLORIDE 20 MG/ML
15 SOLUTION OROPHARYNGEAL AS NEEDED
Status: DISCONTINUED | OUTPATIENT
Start: 2021-04-26 | End: 2021-04-28 | Stop reason: HOSPADM

## 2021-04-26 NOTE — WOUND CARE
Discharge Instructions [address] Telephone: [phone] NAME:  Arnaud Johnson YOB: 1955 MEDICAL RECORD NUMBER:  [id number] DATE:  4/26/2021 Return Appointment: 
[] Dressing supply provider:  
[] Home Healthcare:   
[x] Return Appointment: 1 Week(s) 
[] Nurse Visit:   Week(s) 
 
[] Discharge from [facility] [] Ordered tests:  
[] Referrals:  
[] Rx:  
 
Wound Cleansing: Do not scrub or use excessive force. Cleanse wound prior to applying a clean dressing with: 
[x] Normal Saline  
[x] Mild Soap & Water   
[] Keep Wound Dry in Shower 
[] Other:   
 
Topical Treatments: Do not apply lotions, creams, or ointments to wound bed unless directed. [x] Apply moisturizing lotion to skin surrounding the wound prior to dressing change. 
[] Apply antifungal ointment to skin surrounding the wound prior to dressing change. 
[] Apply thin film of moisture barrier ointment to skin immediately around wound. [] Other:   
  
Dressings:           Wound Location Left leg [x] Apply Primary Dressing:     
 [] MediHoney Gel    [] MediHoney Alginate  
            [] Calcium Alginate      [x] Calcium Alginate with Silver 
 [] Collagen                   [] Collagen with Silver   
            [] Santyl with Moistened saline gauze 
            [] Hydrofera Blue (cut to size and moistened)  [] Hydrofera Blue Ready (Cut to size) [] NS wet to dry    [] Betadine wet to dry 
            [] Hydrogel                [] Mepitel   
 [] Bactroban/Mupirocin  
            [x] Other: adaptic [x] Cover and Secure with:   
 [x] Gauze [] Cristian [] Kerlix [] Foam [x] Super Absorbant [] ABD [] ACE Wrap            [] Other:  
 Avoid contact of tape with skin. [x] Change dressing: [] Daily    [] Every Other Day [] Once per week   [] Twice per week 
 [] Three times per week [x] Do Not Change Dressing   [] Other: Compression: 
Apply: [x] Multilayer Compression Wrap: []RightLeg [x]Left Leg 
                               []Profore  []Profore Lite             [x]Unna [x] Do not get leg(s) with wrap wet. If wraps become too tight call the center or completely remove the wrap. [x] Elevate leg(s) above the level of the heart when sitting. [x] Avoid prolonged standing in one place. Dietary: 
[x] Diet as tolerated: [] Calorie Diabetic Diet: [x] No Added Salt: 
[] Increase Protein: [] Other:  
 
Activity: 
[x] Activity as tolerated:   
[] Patient has no activity restrictions [] Strict Bedrest:  
[] Remain off Work:      
[] May return to full duty work:                                    
[] Return to work with restrictions:  
         
 
[address] Information: Should you experience any significant changes in your wound(s) or have questions about your wound care, please contact Thais Caldera at [place] [address] 8:00 am - 4:30. If you need help with your wound outside of these hours and cannot wait until we are again available, contact your PCP or go to the hospital emergency room. PLEASE NOTE: IF YOU ARE UNABLE TO OBTAIN WOUND SUPPLIES, CONTINUE TO USE THE SUPPLIES YOU HAVE AVAILABLE UNTIL YOU ARE ABLE TO REACH US. IT IS MOST IMPORTANT TO KEEP THE WOUND COVERED AT ALL TIMES. [x] Dr. Loreto Saha  
[] Dr. Maura Osullivan 
[] [facility] [] Dr. Sammie Castro

## 2021-04-26 NOTE — WOUND CARE
[address] Progress Note and Procedure Note Miroslava Gomez MEDICAL RECORD NUMBER:  [id number] AGE: 76 y.o. RACE WHITE  GENDER: male  : 1955 EPISODE DATE:  2021 Subjective:  
C/o pain in L leg wound Drainage also reported increased Wound culture showed enterococcus faecalis, started on Ampicillin, Clindamycin stopped Chief Complaint Patient presents with  Wound Check L lower leg HISTORY of PRESENT ILLNESS HPI Miroslava Gomez is a 76 y.o. male who presents today for wound/ulcer evaluation. History of Wound Context: L leg Wound/Ulcer Pain Timing/Severity: moderate Quality of pain: burning and sharp Severity:  6 / 10 Modifying Factors: Pain is relieved/improved with rest 
Associated Signs/Symptoms: edema Ulcer Identification: 
Ulcer Type: venous Contributing Factors: edema Wound: L leg PAST MEDICAL HISTORY Past Medical History:  
Diagnosis Date  Arthritis  Chronic obstructive pulmonary disease ([facility] [address]  Diabetes ([facility] [address]  Gout  Hypertension  Sleep apnea PAST SURGICAL HISTORY Past Surgical History:  
Procedure Laterality Date  HX ORTHOPAEDIC    
 HX VEIN ABLATION ADHESIVE    
 
 
FAMILY HISTORY Family History Problem Relation Age of Onset  Heart Disease Mother  Kidney Disease Mother  Hypertension Mother  Diabetes Mother  Heart Disease Father  Hypertension Father  Diabetes Sister  Diabetes Brother SOCIAL HISTORY Social History Tobacco Use  Smoking status: Never Smoker  Smokeless tobacco: Never Used Substance Use Topics  Alcohol use: Yes  Drug use: Never ALLERGIES Allergies Allergen Reactions  Elavil Unknown (comments)  Sulfa (Sulfonamide Antibiotics) Nausea and Vomiting MEDICATIONS Current Outpatient Medications on File Prior to Encounter Medication Sig Dispense Refill  ampicillin (PRINCIPEN) 500 mg capsule Take 1 Cap by mouth Before breakfast, lunch, dinner and at bedtime. 40 Cap 0  
 clindamycin (CLEOCIN) 300 mg capsule Take 2 Caps by mouth three (3) times daily for 7 days. Take probiotic with clindamycin  Indications: an infection of the skin and the tissue below the skin 42 Cap 0  
 bumetanide (BUMEX) 2 mg tablet Take 2 mg by mouth two (2) times a day.  multivits,Stress Formula-Zinc tablet Take 1 Tab by mouth daily.  vitamin e (E GEMS) 1,000 unit capsule Take 1,000 Units by mouth two (2) times a day.  multivitamin (ONE A DAY) tablet Take 1 Tab by mouth daily.  ferrous sulfate (Iron) 325 mg (65 mg iron) tablet Take  by mouth Daily (before breakfast).  aspirin 81 mg chewable tablet Take 81 mg by mouth daily.  flunisolide (NASAREL) 25 mcg (0.025 %) spry 2 Sprays two (2) times a day.  albuterol (ProAir HFA) 90 mcg/actuation inhaler Take  by inhalation.  colchicine 0.6 mg tablet Take 0.6 mg by mouth daily.  gabapentin (NEURONTIN) 300 mg capsule Take 300 mg by mouth four (4) times daily.  oxyCODONE IR (ROXICODONE) 5 mg immediate release tablet Take 5 mg by mouth every twelve (12) hours.  metFORMIN (GLUCOPHAGE) 500 mg tablet Take 1,000 mg by mouth two (2) times daily (with meals).  hydroCHLOROthiazide (HYDRODIURIL) 25 mg tablet Take 25 mg by mouth daily.  insulin lispro (HumaLOG U-100 Insulin) 100 unit/mL injection by SubCUTAneous route. Sliding scale  insulin glargine (LANTUS) 100 unit/mL injection by SubCUTAneous route nightly. 80units am/ 20units pm    
 
No current facility-administered medications on file prior to encounter. REVIEW OF SYSTEMS Pertinent items are noted in HPI. Objective:  
 
Visit Vitals BP (!) 110/57 (BP 1 Location: Right upper arm, BP Patient Position: At rest;Sitting) Pulse 89 Temp 98.3 °F (36.8 °C) Resp 20 SpO2 99% Wt Readings from Last 3 Encounters:  
03/29/21 136.1 kg (300 lb)  
11/23/20 (!) 166.5 kg (367 lb) PHYSICAL EXAM 
wound measures slightly smaller, slough debrided, no evidence of active infection Pertinent items are noted in HPI. Assessment:  
L leg wound Problem List Items Addressed This Visit None POP IN PROCEDURE TYPE (DEBRIDEMENT, BIOPSY, I&D, SKIN SUB, CHEMICAL CAUERTY) Plan: Add Adaptic, continue silvercel, drawtex Treatment Note please see attached Discharge Instructions Written patient dismissal instructions given to patient or POA. Electronically signed by Jigar Dyson MD on 4/26/2021 at 2:04 PM 
 
 
 
 
 
 
Debridement Wound Care Problem List Items Addressed This Visit None Procedure Note Indications:  Based on my examination of this patient's wound(s)/ulcer(s) today, debridement is required to promote healing and evaluate the wound base. Performed by: Jigar Dyson MD 
 
Consent obtained: Yes Time out taken: Yes Debridement: Excisional 
 
Using curette the wound(s)/ulcer(s) was/were sharply debrided down through and including the removal of   
dermis and subcutaneous tissue Devitalized Tissue Debrided: slough and necrotic/eschar 
 
Pre Debridement Measurements: 
Are located in the [place]  Documentation Flow Sheet Non-Pressure ulcer, fat layer exposed Wound/Ulcer #: 1 Post Debridement Measurements: 
Wound/Ulcer Descriptions are Pre Debridement except measurements: 
 
Wound Leg lower Left;Lateral #1 (Active) Wound Image   04/26/21 1338 Wound Etiology Venous 04/26/21 1338 Dressing Status Breakthrough drainage noted 04/26/21 1338 Cleansed Soap and water 04/26/21 1338 Dressing/Treatment Leigh Lean boot 10/26/20 1002 Offloading for Diabetic Foot Ulcers No offloading required 04/26/21 1338 Wound Length (cm) 7.5 cm 04/26/21 1338 Wound Width (cm) 5.3 cm 04/26/21 1338 Wound Depth (cm) 0.2 cm 04/26/21 1338 Wound Surface Area (cm^2) 39.75 cm^2 04/26/21 1338 Change in Wound Size % -55.15 04/26/21 1338 Wound Volume (cm^3) 7.95 cm^3 04/26/21 1338 Wound Healing % -55 04/26/21 1338 Post-Procedure Length (cm) 7.5 cm 04/26/21 1347 Post-Procedure Width (cm) 5.3 cm 04/26/21 1347 Post-Procedure Depth (cm) 0.2 cm 04/26/21 1347 Post-Procedure Surface Area (cm^2) 39.75 cm^2 04/26/21 1347 Post-Procedure Volume (cm^3) 7.95 cm^3 04/26/21 1347 Wound Assessment Granulation tissue;Slough 04/26/21 1338 Drainage Amount Large 04/26/21 1338 Drainage Description Serosanguinous 04/26/21 1338 Wound Odor None 04/26/21 1338 Alissa-Wound/Incision Assessment Excoriated; Maceration 04/26/21 1338 Edges Attached edges 04/26/21 1338 Wound Thickness Description Full thickness 04/26/21 1338 Number of days: 224 Total Surface Area Debrided:  39 sq cm Estimated Blood Loss:  Minimal  
 
Hemostasis Achieved: Pressure Procedural Pain: 9 / 10 Post Procedural Pain: 3 / 10 Response to treatment: Well tolerated by patient

## 2021-04-26 NOTE — DISCHARGE INSTRUCTIONS
Discharge Instructions  [address] [place] [address], [address] [place] [address]  Telephone: [phone]    NAME:  Lukasz Acosta OF BIRTH:  1955  MEDICAL RECORD NUMBER:  [id number]  DATE:  4/26/2021      Return Appointment:  [] Dressing supply provider:   [] Home Healthcare:    [x] Return Appointment: 1 Week(s)  [] Nurse Visit:   Week(s)    [] Discharge from [facility]  [] Ordered tests:   [] Referrals:   [] Rx:     Wound Cleansing:   Do not scrub or use excessive force. Cleanse wound prior to applying a clean dressing with:  [x] Normal Saline   [x] Mild Soap & Water    [] Keep Wound Dry in Shower  [] Other:      Topical Treatments:  Do not apply lotions, creams, or ointments to wound bed unless directed. [x] Apply moisturizing lotion to skin surrounding the wound prior to dressing change.  [] Apply antifungal ointment to skin surrounding the wound prior to dressing change.  [] Apply thin film of moisture barrier ointment to skin immediately around wound. [] Other:       Dressings:           Wound Location Left leg   [x] Apply Primary Dressing:       [] MediHoney Gel    [] MediHoney Alginate               [] Calcium Alginate      [x] Calcium Alginate with Silver   [] Collagen                   [] Collagen with Silver                [] Santyl with Moistened saline gauze              [] Hydrofera Blue (cut to size and moistened)  [] Hydrofera Blue Ready (Cut to size)   [] NS wet to dry    [] Betadine wet to dry              [] Hydrogel                [] Mepitel     [] Bactroban/Mupirocin               [x] Other: Adaptic    [x] Cover and Secure with:     [x] Gauze [] Noble Dace [] Kerlix   [] Foam [x] Super Absorbant [] ABD     [] ACE Wrap            [] Other:    Avoid contact of tape with skin.     [x] Change dressing: [] Daily    [] Every Other Day [] Once per week   [] Twice per week   [] Three times per week [x] Do Not Change Dressing   [] Other:     Compression:  Apply: [x] Multilayer Compression Wrap: []RightLeg [x]Left Leg                                 []Profore  []Profore Lite             [x]Unna     [x] Do not get leg(s) with wrap wet. If wraps become too tight call the center or completely remove the wrap. [x] Elevate leg(s) above the level of the heart when sitting. [x] Avoid prolonged standing in one place. Dietary:  [x] Diet as tolerated: [] Calorie Diabetic Diet: [x] No Added Salt:  [] Increase Protein: [] Other:     Activity:  [x] Activity as tolerated:    [] Patient has no activity restrictions       [] Strict Bedrest:   [] Remain off Work:       [] May return to full duty work:                                     [] Return to work with restrictions:               [address] Information: Should you experience any significant changes in your wound(s) or have questions about your wound care, please contact Thais Caldera at [place] [address] 8:00 am - 4:30. If you need help with your wound outside of these hours and cannot wait until we are again available, contact your PCP or go to the hospital emergency room. PLEASE NOTE: IF YOU ARE UNABLE TO OBTAIN WOUND SUPPLIES, CONTINUE TO USE THE SUPPLIES YOU HAVE AVAILABLE UNTIL YOU ARE ABLE TO REACH US. IT IS MOST IMPORTANT TO KEEP THE WOUND COVERED AT ALL TIMES.     [x] Dr. Vera Almanza   [] Dr. Annalise Howard  [] [facility]  [] Dr. Velma Rios

## 2021-04-26 NOTE — WOUND CARE
[employer] Below Knee NAME:  Walter Moritz YOB: 1955 MEDICAL RECORD NUMBER:  [id number] DATE:  4/26/2021 Remove old Unna Boot or Boots. Applied moisturizing agent to dry skin as needed. Appied primary and secondary dressing as ordered. Applied Unna roll from toes to knee overlapping each time. Applied ace wrap or coban from toes to below the knee. Secured with tape and/or metal clips covered with tape. Instructed patient/caregiver to keep dressing dry and intact. DO NOT REMOVE DRESSING. Instructed pt/family/caregiver to report excessive draining, loose bandage, wet dressing, severe pain or tingling in toes. Applied [place] dressing below the knee to left lower leg. Unna Boot(s) were applied per  Guidelines. Response to treatment: Well tolerated by patient  Electronically signed by Sangeeta Woodruff RN on 4/26/2021 at 1:58 PM

## 2021-05-03 ENCOUNTER — HOSPITAL ENCOUNTER (OUTPATIENT)
Dept: WOUND CARE | Age: 66
Discharge: HOME | End: 2021-05-03
Admitting: SPECIALIST
Payer: MEDICARE

## 2021-05-03 VITALS
SYSTOLIC BLOOD PRESSURE: 130 MMHG | HEART RATE: 87 BPM | DIASTOLIC BLOOD PRESSURE: 79 MMHG | TEMPERATURE: 98.2 F | OXYGEN SATURATION: 98 % | RESPIRATION RATE: 20 BRPM

## 2021-05-03 PROCEDURE — 11045 DBRDMT SUBQ TISS EACH ADDL: CPT

## 2021-05-03 PROCEDURE — 11042 DBRDMT SUBQ TIS 1ST 20SQCM/<: CPT

## 2021-05-03 RX ORDER — LIDOCAINE HYDROCHLORIDE 20 MG/ML
15 SOLUTION OROPHARYNGEAL AS NEEDED
Status: DISCONTINUED | OUTPATIENT
Start: 2021-05-03 | End: 2021-05-05 | Stop reason: HOSPADM

## 2021-05-03 NOTE — WOUND CARE
[address] Progress Note and Procedure Note Stefan Metzger MEDICAL RECORD NUMBER:  [id number] AGE: 76 y.o. RACE WHITE  GENDER: male  : 1955 EPISODE DATE:  5/3/2021 Subjective: Chief Complaint Patient presents with  Wound Check L lower leg HISTORY of PRESENT ILLNESS HPI Stefan Metzger is a 76 y.o. male who presents today for wound/ulcer evaluation. History of Wound Context: L leg Wound/Ulcer Pain Timing/Severity: mild and moderate Quality of pain: aching and throbbing Severity:  3 / 10 Modifying Factors: None Associated Signs/Symptoms: edema Ulcer Identification: 
Ulcer Type: venous Contributing Factors: diabetes Wound: L leg PAST MEDICAL HISTORY Past Medical History:  
Diagnosis Date  Arthritis  Chronic obstructive pulmonary disease ([facility] [address]  Diabetes ([facility] [address]  Gout  Hypertension  Sleep apnea PAST SURGICAL HISTORY Past Surgical History:  
Procedure Laterality Date  HX ORTHOPAEDIC    
 HX VEIN ABLATION ADHESIVE    
 
 
FAMILY HISTORY Family History Problem Relation Age of Onset  Heart Disease Mother  Kidney Disease Mother  Hypertension Mother  Diabetes Mother  Heart Disease Father  Hypertension Father  Diabetes Sister  Diabetes Brother SOCIAL HISTORY Social History Tobacco Use  Smoking status: Never Smoker  Smokeless tobacco: Never Used Substance Use Topics  Alcohol use: Yes  Drug use: Never ALLERGIES Allergies Allergen Reactions  Elavil Unknown (comments)  Sulfa (Sulfonamide Antibiotics) Nausea and Vomiting MEDICATIONS Current Outpatient Medications on File Prior to Encounter Medication Sig Dispense Refill  ampicillin (PRINCIPEN) 500 mg capsule Take 1 Cap by mouth Before breakfast, lunch, dinner and at bedtime.  40 Cap 0  
 bumetanide (BUMEX) 2 mg tablet Take 2 mg by mouth two (2) times a day.    
 multivits,Stress Formula-Zinc tablet Take 1 Tab by mouth daily.  vitamin e (E GEMS) 1,000 unit capsule Take 1,000 Units by mouth two (2) times a day.  multivitamin (ONE A DAY) tablet Take 1 Tab by mouth daily.  ferrous sulfate (Iron) 325 mg (65 mg iron) tablet Take  by mouth Daily (before breakfast).  aspirin 81 mg chewable tablet Take 81 mg by mouth daily.  flunisolide (NASAREL) 25 mcg (0.025 %) spry 2 Sprays two (2) times a day.  albuterol (ProAir HFA) 90 mcg/actuation inhaler Take  by inhalation.  colchicine 0.6 mg tablet Take 0.6 mg by mouth daily.  gabapentin (NEURONTIN) 300 mg capsule Take 300 mg by mouth four (4) times daily.  oxyCODONE IR (ROXICODONE) 5 mg immediate release tablet Take 5 mg by mouth every twelve (12) hours.  metFORMIN (GLUCOPHAGE) 500 mg tablet Take 1,000 mg by mouth two (2) times daily (with meals).  hydroCHLOROthiazide (HYDRODIURIL) 25 mg tablet Take 25 mg by mouth daily.  insulin lispro (HumaLOG U-100 Insulin) 100 unit/mL injection by SubCUTAneous route. Sliding scale  insulin glargine (LANTUS) 100 unit/mL injection by SubCUTAneous route nightly. 80units am/ 20units pm    
 
No current facility-administered medications on file prior to encounter. REVIEW OF SYSTEMS Pertinent items are noted in HPI. Objective:  
 
Visit Vitals /79 (BP 1 Location: Right lower arm, BP Patient Position: At rest;Sitting) Pulse 87 Temp 98.2 °F (36.8 °C) Resp 20 SpO2 98% Wt Readings from Last 3 Encounters:  
03/29/21 136.1 kg (300 lb)  
11/23/20 (!) 166.5 kg (367 lb) PHYSICAL EXAM 
Increased skin maceration periwound New smaller satellite skin wound Principal wound measures the same, less slough No evidence of active infection Pertinent items are noted in HPI. Assessment:  
L leg wound not improving, increased moisture Problem List Items Addressed This Visit None POP IN PROCEDURE TYPE (DEBRIDEMENT, BIOPSY, I&D, SKIN SUB, CHEMICAL CAUERTY) Plan:  
Switch to [facility] Add Drawtex, betadine periwound Unna boot Complete current antibiotics then discontinue Nurse visit in 2 days Leg elavation, walk as able Treatment Note please see attached Discharge Instructions Written patient dismissal instructions given to patient or POA. Electronically signed by Rg Mcadams MD on 5/3/2021 at 2:21 PM 
 
 
 
 
 
 
Debridement Wound Care Problem List Items Addressed This Visit None Procedure Note Indications:  Based on my examination of this patient's wound(s)/ulcer(s) today, debridement is required to promote healing and evaluate the wound base. Performed by: Rg Mcadams MD 
 
Consent obtained: Yes Time out taken: Yes Debridement: Excisional 
 
Using curette the wound(s)/ulcer(s) was/were sharply debrided down through and including the removal of   
dermis and subcutaneous tissue Devitalized Tissue Debrided: slough Pre Debridement Measurements: 
Are located in the [place]  Documentation Flow Sheet Non-Pressure ulcer, fat layer exposed Wound/Ulcer #: 1 Post Debridement Measurements: 
Wound/Ulcer Descriptions are Pre Debridement except measurements: 
 
Wound Leg lower Left;Lateral #1 (Active) Wound Image   05/03/21 1407 Wound Etiology Venous 05/03/21 1358 Dressing Status Breakthrough drainage noted 05/03/21 1358 Cleansed Soap and water 05/03/21 1358 Dressing/Treatment Korey Gabe boot 10/26/20 1002 Offloading for Diabetic Foot Ulcers No offloading required 05/03/21 1358 Wound Length (cm) 7 cm 05/03/21 1358 Wound Width (cm) 7.2 cm 05/03/21 1358 Wound Depth (cm) 0.2 cm 05/03/21 1358 Wound Surface Area (cm^2) 50.4 cm^2 05/03/21 1358 Change in Wound Size % -96.72 05/03/21 1358 Wound Volume (cm^3) 10.08 cm^3 05/03/21 1358 Wound Healing % -97 05/03/21 1358 Post-Procedure Length (cm) 7 cm 05/03/21 1412 Post-Procedure Width (cm) 7.2 cm 05/03/21 1412 Post-Procedure Depth (cm) 0.2 cm 05/03/21 1412 Post-Procedure Surface Area (cm^2) 50.4 cm^2 05/03/21 1412 Post-Procedure Volume (cm^3) 10.08 cm^3 05/03/21 1412 Wound Assessment Granulation tissue;Slough 05/03/21 1358 Drainage Amount Large 05/03/21 1358 Drainage Description Serosanguinous 05/03/21 1358 Wound Odor Mild 05/03/21 1358 Alissa-Wound/Incision Assessment Excoriated; Maceration 05/03/21 1358 Edges Attached edges 05/03/21 1358 Wound Thickness Description Full thickness 05/03/21 1358 Number of days: 231 Total Surface Area Debrided:  49 sq cm Estimated Blood Loss:  Minimal  
 
Hemostasis Achieved: Pressure Procedural Pain: 2 / 10 Post Procedural Pain: 0 / 10 Response to treatment: Well tolerated by patient

## 2021-05-03 NOTE — WOUND CARE
Discharge Instructions  [address] [place] [address], [address] [place] [address]  Telephone: [phone]    NAME:  Tristan Rosas OF BIRTH:  1955  MEDICAL RECORD NUMBER:  [id number]  DATE: 5/3/2021      Return Appointment:  [] Dressing supply provider:   [] Home Healthcare:    [x] Return Appointment: 1 Week(s)  [x] Nurse Visit:   Week(s)    [] Discharge from [facility]  [] Ordered tests:   [] Referrals:   [] Rx:     Wound Cleansing:   Do not scrub or use excessive force. Cleanse wound prior to applying a clean dressing with:  [x] Normal Saline   [x] Mild Soap & Water    [] Keep Wound Dry in Shower  [] Other:      Topical Treatments:  Do not apply lotions, creams, or ointments to wound bed unless directed. [x] Apply moisturizing lotion to skin surrounding the wound prior to dressing change.  [] Apply antifungal ointment to skin surrounding the wound prior to dressing change.  [] Apply thin film of moisture barrier ointment to skin immediately around wound. [] Other:       Dressings:           Wound Location Left leg   [x] Apply Primary Dressing:       [] MediHoney Gel    [] MediHoney Alginate               [] Calcium Alginate      [] Calcium Alginate with Silver   [] Collagen                   [] Collagen with Silver                [] Santyl with Moistened saline gauze              [x] Hydrofera Blue (cut to size and moistened)  [] Hydrofera Blue Ready (Cut to size)   [] NS wet to dry    [] Betadine wet to dry              [] Hydrogel                [] Mepitel     [] Bactroban/Mupirocin               [] Other: adaptic    [x] Cover and Secure with:     [x] Gauze [] Dmitry Downs [] Kerlix   [] Foam [x] Super Absorbant [] ABD     [] ACE Wrap            [] Other:    Avoid contact of tape with skin.     [x] Change dressing: [] Daily    [] Every Other Day [] Once per week   [x] Twice per week   [] Three times per week [] Do Not Change Dressing   [] Other:     Compression:  Apply: [x] Multilayer Compression Wrap: []RightLeg [x]Left Leg                                 []Profore  []Profore Lite             [x]Unna     [x] Do not get leg(s) with wrap wet. If wraps become too tight call the center or completely remove the wrap. [x] Elevate leg(s) above the level of the heart when sitting. [x] Avoid prolonged standing in one place. Dietary:  [x] Diet as tolerated: [] Calorie Diabetic Diet: [x] No Added Salt:  [] Increase Protein: [] Other:     Activity:  [x] Activity as tolerated:    [] Patient has no activity restrictions       [] Strict Bedrest:   [] Remain off Work:       [] May return to full duty work:                                     [] Return to work with restrictions:               [address] Information: Should you experience any significant changes in your wound(s) or have questions about your wound care, please contact Thais Caldera at [place] [address] 8:00 am - 4:30. If you need help with your wound outside of these hours and cannot wait until we are again available, contact your PCP or go to the hospital emergency room. PLEASE NOTE: IF YOU ARE UNABLE TO OBTAIN WOUND SUPPLIES, CONTINUE TO USE THE SUPPLIES YOU HAVE AVAILABLE UNTIL YOU ARE ABLE TO REACH US. IT IS MOST IMPORTANT TO KEEP THE WOUND COVERED AT ALL TIMES.     [x] Dr. Marvin Otero   [] Dr. Sebastián Brooks  [] [facility] Il[facility]esi Kussmaul [facility]  [] Dr. Dorie Dailey

## 2021-05-03 NOTE — WOUND CARE
[place] Application   Below Knee    NAME:  Andrews Holliday OF BIRTH:  1955  MEDICAL RECORD NUMBER:  [id number]  DATE:  5/3/2021    Remove old Unna Boot or Boots. Applied moisturizing agent to dry skin as needed. Appied primary and secondary dressing as ordered. Applied Unna roll from toes to knee overlapping each time. Applied ace wrap or coban from toes to below the knee. Secured with tape and/or metal clips covered with tape. Instructed patient/caregiver to keep dressing dry and intact. DO NOT REMOVE DRESSING. Instructed pt/family/caregiver to report excessive draining, loose bandage, wet dressing, severe pain or tingling in toes. Applied [place] dressing below the knee to left lower leg. Unna Boot(s) were applied per  Guidelines.     Response to treatment: Well tolerated by patient      Electronically signed by Pillo Olguin RN on 5/3/2021 at 2:27 PM

## 2021-05-06 ENCOUNTER — HOSPITAL ENCOUNTER (OUTPATIENT)
Dept: WOUND CARE | Age: 66
Discharge: HOME | End: 2021-05-06
Admitting: SPECIALIST
Payer: MEDICARE

## 2021-05-06 VITALS
HEART RATE: 89 BPM | DIASTOLIC BLOOD PRESSURE: 78 MMHG | TEMPERATURE: 97.9 F | SYSTOLIC BLOOD PRESSURE: 129 MMHG | OXYGEN SATURATION: 98 % | RESPIRATION RATE: 20 BRPM

## 2021-05-06 PROCEDURE — 29580 STRAPPING UNNA BOOT: CPT

## 2021-05-06 NOTE — WOUND CARE
[employer] Below Knee NAME:  Elizabeth Fish YOB: 1955 MEDICAL RECORD NUMBER:  [id number] DATE:  5/6/2021 Applied moisturizing agent to dry skin as needed. Appied primary and secondary dressing as ordered. Applied Unna roll from toes to knee overlapping each time. Applied ace wrap or coban from toes to below the knee. Secured with tape and/or metal clips covered with tape. Instructed patient/caregiver to keep dressing dry and intact. DO NOT REMOVE DRESSING. Instructed pt/family/caregiver to report excessive draining, loose bandage, wet dressing, severe pain or tingling in toes. Applied [place] dressing below the knee to left lower leg. Unna Boot(s) were applied per  Guidelines. Response to treatment: Well tolerated by patient  Electronically signed by Piter Bonilla RN on 5/6/2021 at 2:34 PM

## 2021-05-10 ENCOUNTER — HOSPITAL ENCOUNTER (OUTPATIENT)
Dept: WOUND CARE | Age: 66
Discharge: HOME | End: 2021-05-10
Admitting: SPECIALIST
Payer: MEDICARE

## 2021-05-10 VITALS
RESPIRATION RATE: 20 BRPM | OXYGEN SATURATION: 97 % | TEMPERATURE: 98.3 F | DIASTOLIC BLOOD PRESSURE: 76 MMHG | SYSTOLIC BLOOD PRESSURE: 128 MMHG | HEART RATE: 89 BPM

## 2021-05-10 DIAGNOSIS — I87.312 IDIOPATHIC CHRONIC VENOUS HYPERTENSION OF LEFT LOWER EXTREMITY WITH ULCER (HCC): ICD-10-CM

## 2021-05-10 DIAGNOSIS — L97.929 IDIOPATHIC CHRONIC VENOUS HYPERTENSION OF LEFT LOWER EXTREMITY WITH ULCER (HCC): ICD-10-CM

## 2021-05-10 DIAGNOSIS — R60.0 LOCALIZED EDEMA: ICD-10-CM

## 2021-05-10 PROCEDURE — 11042 DBRDMT SUBQ TIS 1ST 20SQCM/<: CPT

## 2021-05-10 PROCEDURE — 87186 SC STD MICRODIL/AGAR DIL: CPT

## 2021-05-10 PROCEDURE — 87205 SMEAR GRAM STAIN: CPT

## 2021-05-10 PROCEDURE — 11045 DBRDMT SUBQ TISS EACH ADDL: CPT

## 2021-05-10 PROCEDURE — 87077 CULTURE AEROBIC IDENTIFY: CPT

## 2021-05-10 RX ORDER — LIDOCAINE HYDROCHLORIDE 20 MG/ML
15 SOLUTION OROPHARYNGEAL AS NEEDED
Status: DISCONTINUED | OUTPATIENT
Start: 2021-05-10 | End: 2021-05-12 | Stop reason: HOSPADM

## 2021-05-10 NOTE — WOUND CARE
Discharge Instructions [address] [facility], [address] [facility] Telephone: [phone] NAME:  Miroslava Gomez YOB: 1955 MEDICAL RECORD NUMBER:  [id number] DATE: 5/10/2021 Return Appointment: 
[] Dressing supply provider:  
[] Home Healthcare:   
[x] Return Appointment: 1 Week(s) 
[] Nurse Visit:   Week(s) 
 
[] Discharge from [facility] [] Ordered tests:  
[] Referrals:  
[] Rx:  
 
Wound Cleansing: Do not scrub or use excessive force. Cleanse wound prior to applying a clean dressing with: 
[x] Normal Saline  
[x] Mild Soap & Water   
[] Keep Wound Dry in Shower 
[] Other:   
 
Topical Treatments: Do not apply lotions, creams, or ointments to wound bed unless directed. [x] Apply moisturizing lotion to skin surrounding the wound prior to dressing change. 
[] Apply antifungal ointment to skin surrounding the wound prior to dressing change. 
[] Apply thin film of moisture barrier ointment to skin immediately around wound. [x] Other:  Betadine to israel-wound Dressings:           Wound Location Left leg [x] Apply Primary Dressing:     
 [] MediHoney Gel    [] MediHoney Alginate  
            [] Calcium Alginate      [] Calcium Alginate with Silver 
 [] Collagen                   [] Collagen with Silver   
            [] Santyl with Moistened saline gauze [x] Hydrofera Blue (cut to size and moistened)  [] Hydrofera Blue Ready (Cut to size) [] NS wet to dry    [] Betadine wet to dry 
            [] Hydrogel                [] Mepitel   
 [] Bactroban/Mupirocin  
            [x] Other: Drawtex [x] Cover and Secure with:   
 [x] Gauze [] Cristian [] Kerlix [] Foam [x] Super Absorbant [] ABD [] ACE Wrap            [] Other:  
 Avoid contact of tape with skin.  
 
[x] Change dressing: [] Daily    [] Every Other Day [] Once per week   [x] Twice per week 
 [] Three times per week [] Do Not Change Dressing   [] Other: 
  
Compression: 
Apply: [x] Multilayer Compression Wrap: []RightLeg [x]Left Leg 
                               []Profore  []Profore Lite             [x]Unna [x] Do not get leg(s) with wrap wet. If wraps become too tight call the center or completely remove the wrap. [x] Elevate leg(s) above the level of the heart when sitting. [x] Avoid prolonged standing in one place. Dietary: 
[x] Diet as tolerated: [] Calorie Diabetic Diet: [x] No Added Salt: 
[] Increase Protein: [] Other:  
 
Activity: 
[x] Activity as tolerated:   
[] Patient has no activity restrictions [] Strict Bedrest:  
[] Remain off Work:      
[] May return to full duty work:                                    
[] Return to work with restrictions:  
         
 
[address] [facility] Information: Should you experience any significant changes in your wound(s) or have questions about your wound care, please contact Thais Caldera at [place] [address] 8:00 am - 4:30. If you need help with your wound outside of these hours and cannot wait until we are again available, contact your PCP or go to the hospital emergency room. PLEASE NOTE: IF YOU ARE UNABLE TO OBTAIN WOUND SUPPLIES, CONTINUE TO USE THE SUPPLIES YOU HAVE AVAILABLE UNTIL YOU ARE ABLE TO REACH US. IT IS MOST IMPORTANT TO KEEP THE WOUND COVERED AT ALL TIMES. [x] Dr. Milagro Marie  
[] Dr. Patricia Nuñez 
[] Peng Peacock [facility] [] Dr. Yan Chi

## 2021-05-10 NOTE — WOUND CARE
[employer] Below Knee NAME:  Geni Porter YOB: 1955 MEDICAL RECORD NUMBER:  [id number] DATE:  5/10/2021 Remove old Unna Boot or Boots. Applied moisturizing agent to dry skin as needed. Appied primary and secondary dressing as ordered. Applied Unna roll from toes to knee overlapping each time. Applied ace wrap or coban from toes to below the knee. Secured with tape and/or metal clips covered with tape. Instructed patient/caregiver to keep dressing dry and intact. DO NOT REMOVE DRESSING. Instructed pt/family/caregiver to report excessive draining, loose bandage, wet dressing, severe pain or tingling in toes. Applied [place] dressing below the knee to left lower leg. Unna Boot(s) were applied per  Guidelines. Response to treatment: Well tolerated by patient  Electronically signed by Jd Huddleston RN on 5/10/2021 at 2:41 PM

## 2021-05-10 NOTE — DISCHARGE INSTRUCTIONS
Discharge Instructions  [address] [place] [address]  [address], [address] [facility]  Telephone: [phone]    NAME:  Torsten Henning OF BIRTH:  1955  MEDICAL RECORD NUMBER:  [id number]  DATE: 5/10/2021      Return Appointment:  [] Dressing supply provider:   [] Home Healthcare:    [x] Return Appointment: 1 Week(s)  [x] Nurse Visit:   Thursday    [] Discharge from [facility]  [x] Ordered tests: culture obtained in clinic   [] Referrals:   [x] Rx: resume Clindamycin    Wound Cleansing:   Do not scrub or use excessive force. Cleanse wound prior to applying a clean dressing with:  [x] Normal Saline   [x] Mild Soap & Water    [] Keep Wound Dry in Shower  [] Other:      Topical Treatments:  Do not apply lotions, creams, or ointments to wound bed unless directed. [x] Apply moisturizing lotion to skin surrounding the wound prior to dressing change.  [] Apply antifungal ointment to skin surrounding the wound prior to dressing change.  [] Apply thin film of moisture barrier ointment to skin immediately around wound. [x] Other:  Betadine to israel-wound     Dressings:           Wound Location Left leg   [x] Apply Primary Dressing:       [] MediHoney Gel    [] MediHoney Alginate               [] Calcium Alginate      [] Calcium Alginate with Silver   [] Collagen                   [] Collagen with Silver                [] Santyl with Moistened saline gauze              [x] Hydrofera Blue (cut to size and moistened)  [] Hydrofera Blue Ready (Cut to size)   [] NS wet to dry    [] Betadine wet to dry              [] Hydrogel                [] Mepitel     [] Bactroban/Mupirocin               [x] Other: Drawtex    [x] Cover and Secure with:     [x] Gauze [] Chris Roers [] Kerlix   [] Foam [x] Super Absorbant [] ABD     [] ACE Wrap            [] Other:    Avoid contact of tape with skin.     [x] Change dressing: [] Daily    [] Every Other Day [] Once per week   [x] Twice per week   [] Three times per week [] Do Not Change Dressing   [] Other:     Compression:  Apply: [x] Multilayer Compression Wrap: []RightLeg [x]Left Leg                                 []Profore  []Profore Lite             [x]Unna     [x] Do not get leg(s) with wrap wet. If wraps become too tight call the center or completely remove the wrap. [x] Elevate leg(s) above the level of the heart when sitting. [x] Avoid prolonged standing in one place. Dietary:  [x] Diet as tolerated: [] Calorie Diabetic Diet: [x] No Added Salt:  [] Increase Protein: [] Other:     Activity:  [x] Activity as tolerated:    [] Patient has no activity restrictions       [] Strict Bedrest:   [] Remain off Work:       [] May return to full duty work:                                     [] Return to work with restrictions:               [address] Information: Should you experience any significant changes in your wound(s) or have questions about your wound care, please contact Thais Doyle 26 at [place] [address] 8:00 am - 4:30. If you need help with your wound outside of these hours and cannot wait until we are again available, contact your PCP or go to the hospital emergency room. PLEASE NOTE: IF YOU ARE UNABLE TO OBTAIN WOUND SUPPLIES, CONTINUE TO USE THE SUPPLIES YOU HAVE AVAILABLE UNTIL YOU ARE ABLE TO REACH US. IT IS MOST IMPORTANT TO KEEP THE WOUND COVERED AT ALL TIMES.     [x] Dr. Pillo Valenzuela   [] Dr. Venita Alatorre  [] [facility]  [] Dr. Roosevelt Alberto

## 2021-05-10 NOTE — WOUND CARE
[address] Progress Note and Procedure Note Viky Villa MEDICAL RECORD NUMBER:  [id number] AGE: 76 y.o. RACE WHITE  GENDER: male  : 1955 EPISODE DATE:  5/10/2021 Subjective: Chief Complaint Patient presents with  Wound Check  
  left lateral  
  
  
HISTORY of PRESENT ILLNESS HPI Viky Villa is a 76 y.o. male who presents today for wound/ulcer evaluation. History of Wound Context: L leg Wound/Ulcer Pain Timing/Severity: moderate and severe Quality of pain: sharp and stabbing Severity:  6 / 10 Modifying Factors: None Associated Signs/Symptoms: edema Ulcer Identification: 
Ulcer Type: venous Contributing Factors: diabetes Wound: L,leg PAST MEDICAL HISTORY Past Medical History:  
Diagnosis Date  Arthritis  Chronic obstructive pulmonary disease ([facility] [address]  Diabetes ([facility] [address]  Gout  Hypertension  Sleep apnea PAST SURGICAL HISTORY Past Surgical History:  
Procedure Laterality Date  HX ORTHOPAEDIC    
 HX VEIN ABLATION ADHESIVE    
 
 
FAMILY HISTORY Family History Problem Relation Age of Onset  Heart Disease Mother  Kidney Disease Mother  Hypertension Mother  Diabetes Mother  Heart Disease Father  Hypertension Father  Diabetes Sister  Diabetes Brother SOCIAL HISTORY Social History Tobacco Use  Smoking status: Never Smoker  Smokeless tobacco: Never Used Substance Use Topics  Alcohol use: Yes  Drug use: Never ALLERGIES Allergies Allergen Reactions  Elavil Unknown (comments)  Sulfa (Sulfonamide Antibiotics) Nausea and Vomiting MEDICATIONS Current Outpatient Medications on File Prior to Encounter Medication Sig Dispense Refill  ampicillin (PRINCIPEN) 500 mg capsule Take 1 Cap by mouth Before breakfast, lunch, dinner and at bedtime.  40 Cap 0  
 bumetanide (BUMEX) 2 mg tablet Take 2 mg by mouth two (2) times a day.    
 multivits,Stress Formula-Zinc tablet Take 1 Tab by mouth daily.  vitamin e (E GEMS) 1,000 unit capsule Take 1,000 Units by mouth two (2) times a day.  multivitamin (ONE A DAY) tablet Take 1 Tab by mouth daily.  ferrous sulfate (Iron) 325 mg (65 mg iron) tablet Take  by mouth Daily (before breakfast).  aspirin 81 mg chewable tablet Take 81 mg by mouth daily.  flunisolide (NASAREL) 25 mcg (0.025 %) spry 2 Sprays two (2) times a day.  albuterol (ProAir HFA) 90 mcg/actuation inhaler Take  by inhalation.  colchicine 0.6 mg tablet Take 0.6 mg by mouth daily.  gabapentin (NEURONTIN) 300 mg capsule Take 300 mg by mouth four (4) times daily.  oxyCODONE IR (ROXICODONE) 5 mg immediate release tablet Take 5 mg by mouth every twelve (12) hours.  metFORMIN (GLUCOPHAGE) 500 mg tablet Take 1,000 mg by mouth two (2) times daily (with meals).  hydroCHLOROthiazide (HYDRODIURIL) 25 mg tablet Take 25 mg by mouth daily.  insulin lispro (HumaLOG U-100 Insulin) 100 unit/mL injection by SubCUTAneous route. Sliding scale  insulin glargine (LANTUS) 100 unit/mL injection by SubCUTAneous route nightly. 80units am/ 20units pm    
 
No current facility-administered medications on file prior to encounter. REVIEW OF SYSTEMS Pertinent items are noted in HPI. Objective:  
 
Visit Vitals /76 Pulse 89 Temp 98.3 °F (36.8 °C) Resp 20 SpO2 97% Wt Readings from Last 3 Encounters:  
03/29/21 136.1 kg (300 lb)  
11/23/20 (!) 166.5 kg (367 lb) PHYSICAL EXAM 
Erythema skin L leg periwound, c/w cellulitis Principal wound measures slightly larger, slough debrided, culture taken Pertinent items are noted in HPI. Assessment: No progress, possible cellulitis Problem List Items Addressed This Visit Idiopathic chronic venous hypertension of left lower extremity with ulcer ([facility] [address] Localized edema POP IN PROCEDURE TYPE (DEBRIDEMENT, BIOPSY, I&D, SKIN SUB, CHEMICAL CAUERTY) Plan:  
Restart clindamycin prescribed previously Check culture result and modify accordingly Continue Betadine periwound, Adaptic, Hyrofera Blue, Drawtex, Unna boot Treatment Note please see attached Discharge Instructions Written patient dismissal instructions given to patient or POA. Electronically signed by Karis Hawk MD on 5/10/2021 at 2:45 PM 
 
 
 
 
 
 
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 Performed by: Karis Hawk MD 
 
Consent obtained: Yes Time out taken: Yes Debridement: Excisional 
 
Using curette the wound(s)/ulcer(s) was/were sharply debrided down through and including the removal of   
dermis and subcutaneous tissue Devitalized Tissue Debrided: slough and necrotic/eschar 
 
Pre Debridement Measurements: 
Are located in the [employer]  Documentation Flow Sheet Non-Pressure ulcer, fat layer exposed Wound/Ulcer #: 1 Post Debridement Measurements: 
Wound/Ulcer Descriptions are Pre Debridement except measurements: 
 
Wound Leg lower Left;Lateral #1 (Active) Wound Image   05/10/21 1414 Wound Etiology Venous 05/10/21 1414 Dressing Status Clean;Dry; Intact 05/10/21 1414 Cleansed Soap and water 05/10/21 1414 Dressing/Treatment Congx Claude boot 10/26/20 1002 Offloading for Diabetic Foot Ulcers No offloading required 05/10/21 1414 Wound Length (cm) 8.2 cm 05/10/21 1414 Wound Width (cm) 7.8 cm 05/10/21 1414 Wound Depth (cm) 0.2 cm 05/10/21 1414 Wound Surface Area (cm^2) 63.96 cm^2 05/10/21 1414 Change in Wound Size % -149.65 05/10/21 1414 Wound Volume (cm^3) 12.79 cm^3 05/10/21 1414 Wound Healing % -150 05/10/21 1414 Post-Procedure Length (cm) 8.2 cm 05/10/21 1428 Post-Procedure Width (cm) 7.8 cm 05/10/21 1428 Post-Procedure Depth (cm) 0.2 cm 05/10/21 1428 Post-Procedure Surface Area (cm^2) 63.96 cm^2 05/10/21 1428 Post-Procedure Volume (cm^3) 12.79 cm^3 05/10/21 1428 Wound Assessment Granulation tissue;Slough 05/10/21 1414 Drainage Amount Large 05/10/21 1414 Drainage Description Serous 05/10/21 1414 Wound Odor None 05/10/21 1414 Alissa-Wound/Incision Assessment Excoriated; Maceration 05/10/21 1414 Edges Attached edges 05/10/21 1414 Wound Thickness Description Full thickness 05/10/21 1414 Number of days: 238 Total Surface Area Debrided:  60 sq cm Estimated Blood Loss:  Minimal  
 
Hemostasis Achieved: Pressure Procedural Pain: 7 / 10 Post Procedural Pain: 3 / 10 Response to treatment: Well tolerated by patient

## 2021-05-13 ENCOUNTER — HOSPITAL ENCOUNTER (OUTPATIENT)
Dept: WOUND CARE | Age: 66
Discharge: HOME | End: 2021-05-13
Admitting: SPECIALIST
Payer: MEDICARE

## 2021-05-13 VITALS
SYSTOLIC BLOOD PRESSURE: 115 MMHG | DIASTOLIC BLOOD PRESSURE: 69 MMHG | RESPIRATION RATE: 20 BRPM | HEART RATE: 87 BPM | TEMPERATURE: 98.3 F

## 2021-05-13 DIAGNOSIS — L97.929 IDIOPATHIC CHRONIC VENOUS HYPERTENSION OF LEFT LOWER EXTREMITY WITH ULCER (HCC): ICD-10-CM

## 2021-05-13 DIAGNOSIS — R60.0 LOCALIZED EDEMA: ICD-10-CM

## 2021-05-13 DIAGNOSIS — I87.312 IDIOPATHIC CHRONIC VENOUS HYPERTENSION OF LEFT LOWER EXTREMITY WITH ULCER (HCC): ICD-10-CM

## 2021-05-13 PROCEDURE — 29580 STRAPPING UNNA BOOT: CPT

## 2021-05-13 NOTE — WOUND CARE
[employer] Below Knee NAME:  Dylan Fam YOB: 1955 MEDICAL RECORD NUMBER:  [id number] DATE:  5/13/2021 Remove old Unna Boot or Boots. Applied moisturizing agent to dry skin as needed. Appied primary and secondary dressing as ordered. Applied Unna roll from toes to knee overlapping each time. Applied ace wrap or coban from toes to below the knee. Secured with tape and/or metal clips covered with tape. Instructed patient/caregiver to keep dressing dry and intact. DO NOT REMOVE DRESSING. Instructed pt/family/caregiver to report excessive draining, loose bandage, wet dressing, severe pain or tingling in toes. Applied [place] dressing below the knee to left lower leg. Unna Boot(s) were applied per  Guidelines. Response to treatment: Well tolerated by patient  Electronically signed by Barbara Rosa RN on 5/13/2021 at 2:01 PM

## 2021-05-14 LAB
BACTERIA SPEC CULT: NORMAL
GRAM STN SPEC: NORMAL
GRAM STN SPEC: NORMAL
SPECIAL REQUESTS,SREQ: NORMAL

## 2021-05-17 ENCOUNTER — HOSPITAL ENCOUNTER (OUTPATIENT)
Dept: WOUND CARE | Age: 66
Discharge: HOME | End: 2021-05-17
Admitting: SPECIALIST
Payer: MEDICARE

## 2021-05-17 VITALS
HEART RATE: 75 BPM | OXYGEN SATURATION: 97 % | TEMPERATURE: 97.6 F | SYSTOLIC BLOOD PRESSURE: 141 MMHG | RESPIRATION RATE: 18 BRPM | DIASTOLIC BLOOD PRESSURE: 88 MMHG

## 2021-05-17 PROCEDURE — 29580 STRAPPING UNNA BOOT: CPT

## 2021-05-17 RX ORDER — LIDOCAINE HYDROCHLORIDE 20 MG/ML
15 SOLUTION OROPHARYNGEAL AS NEEDED
Status: DISCONTINUED | OUTPATIENT
Start: 2021-05-17 | End: 2021-05-19 | Stop reason: HOSPADM

## 2021-05-17 NOTE — WOUND CARE
[address] Progress Note and Procedure Note NO Procedure Geni Porter MEDICAL RECORD NUMBER:  [id number] AGE: 76 y.o. RACE WHITE  GENDER: male  : 1955 EPISODE DATE:  2021 Subjective:  
Still c/o pain, no fever Chief Complaint Patient presents with  Wound Check L lower leg HISTORY of PRESENT ILLNESS HPI Geni Porter is a 76 y.o. male who presents today for wound/ulcer evaluation. History of Wound Context: L leg Wound/Ulcer Pain Timing/Severity: constant, moderate and severe Quality of pain: aching and stabbing Severity:  6 / 10 Modifying Factors: None Associated Signs/Symptoms: edema Ulcer Identification: 
Ulcer Type: venous Contributing Factors: edema Wound: L leg PAST MEDICAL HISTORY Past Medical History:  
Diagnosis Date  Arthritis  Chronic obstructive pulmonary disease ([facility] [address]  Diabetes ([facility] [address]  Gout  Hypertension  Sleep apnea PAST SURGICAL HISTORY Past Surgical History:  
Procedure Laterality Date  HX ORTHOPAEDIC    
 HX VEIN ABLATION ADHESIVE    
 
 
FAMILY HISTORY Family History Problem Relation Age of Onset  Heart Disease Mother  Kidney Disease Mother  Hypertension Mother  Diabetes Mother  Heart Disease Father  Hypertension Father  Diabetes Sister  Diabetes Brother SOCIAL HISTORY Social History Tobacco Use  Smoking status: Never Smoker  Smokeless tobacco: Never Used Substance Use Topics  Alcohol use: Yes  Drug use: Never ALLERGIES Allergies Allergen Reactions  Elavil Unknown (comments)  Sulfa (Sulfonamide Antibiotics) Nausea and Vomiting MEDICATIONS Current Outpatient Medications on File Prior to Encounter Medication Sig Dispense Refill  ampicillin (PRINCIPEN) 500 mg capsule Take 1 Cap by mouth Before breakfast, lunch, dinner and at bedtime.  40 Cap 0  
 bumetanide (BUMEX) 2 mg tablet Take 2 mg by mouth two (2) times a day.  multivits,Stress Formula-Zinc tablet Take 1 Tab by mouth daily.  vitamin e (E GEMS) 1,000 unit capsule Take 1,000 Units by mouth two (2) times a day.  multivitamin (ONE A DAY) tablet Take 1 Tab by mouth daily.  ferrous sulfate (Iron) 325 mg (65 mg iron) tablet Take  by mouth Daily (before breakfast).  aspirin 81 mg chewable tablet Take 81 mg by mouth daily.  flunisolide (NASAREL) 25 mcg (0.025 %) spry 2 Sprays two (2) times a day.  albuterol (ProAir HFA) 90 mcg/actuation inhaler Take  by inhalation.  colchicine 0.6 mg tablet Take 0.6 mg by mouth daily.  gabapentin (NEURONTIN) 300 mg capsule Take 300 mg by mouth four (4) times daily.  oxyCODONE IR (ROXICODONE) 5 mg immediate release tablet Take 5 mg by mouth every twelve (12) hours.  metFORMIN (GLUCOPHAGE) 500 mg tablet Take 1,000 mg by mouth two (2) times daily (with meals).  hydroCHLOROthiazide (HYDRODIURIL) 25 mg tablet Take 25 mg by mouth daily.  insulin lispro (HumaLOG U-100 Insulin) 100 unit/mL injection by SubCUTAneous route. Sliding scale  insulin glargine (LANTUS) 100 unit/mL injection by SubCUTAneous route nightly. 80units am/ 20units pm    
 
No current facility-administered medications on file prior to encounter. REVIEW OF SYSTEMS Pertinent items are noted in HPI. Objective:  
 
Visit Vitals BP (!) 141/88 (BP 1 Location: Right upper arm, BP Patient Position: At rest;Sitting) Pulse 75 Temp 97.6 °F (36.4 °C) Resp 18 SpO2 97% Wt Readings from Last 3 Encounters:  
03/29/21 136.1 kg (300 lb)  
11/23/20 (!) 166.5 kg (367 lb) PHYSICAL EXAM 
Less erythema, less swelling, wound measurements less Pertinent items are noted in HPI. Assessment:  
Some improvement Problem List Items Addressed This Visit None Procedure Note Indications:  Based on my examination of this patient's wound(s)/ulcer(s) today, debridement is not required to promote healing and evaluate the wound base. Wound Leg lower Left;Lateral #1 (Active) Wound Image   05/17/21 1348 Wound Etiology Venous 05/17/21 1348 Dressing Status Clean;Dry; Intact 05/17/21 1348 Cleansed Soap and water 05/17/21 1348 Dressing/Treatment Faye hightowerot 10/26/20 1002 Offloading for Diabetic Foot Ulcers No 05/17/21 1348 Wound Length (cm) 5 cm 05/17/21 1348 Wound Width (cm) 5.2 cm 05/17/21 1348 Wound Depth (cm) 0.2 cm 05/17/21 1348 Wound Surface Area (cm^2) 26 cm^2 05/17/21 1348 Change in Wound Size % -1.48 05/17/21 1348 Wound Volume (cm^3) 5.2 cm^3 05/17/21 1348 Wound Healing % -2 05/17/21 1348 Post-Procedure Length (cm) 8.2 cm 05/10/21 1428 Post-Procedure Width (cm) 7.8 cm 05/10/21 1428 Post-Procedure Depth (cm) 0.2 cm 05/10/21 1428 Post-Procedure Surface Area (cm^2) 63.96 cm^2 05/10/21 1428 Post-Procedure Volume (cm^3) 12.79 cm^3 05/10/21 1428 Wound Assessment Slough;Pink/red;Granulation tissue 05/17/21 1348 Drainage Amount Large 05/17/21 1348 Drainage Description Serosanguinous 05/17/21 1348 Wound Odor None 05/17/21 1348 Alissa-Wound/Incision Assessment Maceration; Excoriated 05/17/21 1348 Edges Attached edges 05/17/21 1348 Wound Thickness Description Full thickness 05/17/21 1348 Number of days: 245 Plan:  
Switch to "[employer] Treatment Note please see attached Discharge Instructions Written patient dismissal instructions given to patient or POA.       
 
Electronically signed by Moises Gao MD on 5/17/2021 at 2:29 PM

## 2021-05-17 NOTE — WOUND CARE
Discharge Instructions [address] [facility], [address] [facility] Telephone: [phone] NAME:  Kemar Conner YOB: 1955 MEDICAL RECORD NUMBER:  [id number] DATE: 5/17/2021 Return Appointment: 
[] Dressing supply provider:  
[] Home Healthcare:   
[x] Return Appointment: 1 Week(s) 
[] Nurse Visit:   Week(s) 
 
[] Discharge from [facility] [] Ordered tests:  
[] Referrals:  
[] Rx:  
 
Wound Cleansing: Do not scrub or use excessive force. Cleanse wound prior to applying a clean dressing with: 
[x] Normal Saline  
[x] Mild Soap & Water   
[] Keep Wound Dry in Shower 
[] Other:   
 
Topical Treatments: Do not apply lotions, creams, or ointments to wound bed unless directed. [x] Apply moisturizing lotion to skin surrounding the wound prior to dressing change. 
[] Apply antifungal ointment to skin surrounding the wound prior to dressing change. 
[] Apply thin film of moisture barrier ointment to skin immediately around wound. [x] Other:  Betadine to israel-wound Dressings:           Wound Location Left leg [x] Apply Primary Dressing:     
 [] MediHoney Gel    [] MediHoney Alginate  
            [] Calcium Alginate      [x] Calcium Alginate with Silver 
 [] Collagen                   [] Collagen with Silver   
            [] Santyl with Moistened saline gauze 
            [] Hydrofera Blue (cut to size and moistened)  [] Hydrofera Blue Ready (Cut to size) [] NS wet to dry    [] Betadine wet to dry 
            [] Hydrogel                [] Mepitel   
 [] Bactroban/Mupirocin  
            [x] Other: Drawtex [x] Cover and Secure with:   
 [x] Gauze [] Cristian [] Kerlix [] Foam [x] Super Absorbant [] ABD [] ACE Wrap            [] Other:  
 Avoid contact of tape with skin.  
 
[x] Change dressing: [] Daily    [] Every Other Day [] Once per week   [x] Twice per week 
 [] Three times per week [] Do Not Change Dressing   [] Other: 
  
Compression: 
Apply: [x] Multilayer Compression Wrap: []RightLeg [x]Left Leg 
                               []Profore  []Profore Lite             [x]Unna [x] Do not get leg(s) with wrap wet. If wraps become too tight call the center or completely remove the wrap. [x] Elevate leg(s) above the level of the heart when sitting. [x] Avoid prolonged standing in one place. Dietary: 
[x] Diet as tolerated: [] Calorie Diabetic Diet: [x] No Added Salt: 
[] Increase Protein: [] Other:  
 
Activity: 
[x] Activity as tolerated:   
[] Patient has no activity restrictions [] Strict Bedrest:  
[] Remain off Work:      
[] May return to full duty work:                                    
[] Return to work with restrictions:  
         
 
[address] Information: Should you experience any significant changes in your wound(s) or have questions about your wound care, please contact Thais Caldera at [place] [address] 8:00 am - 4:30. If you need help with your wound outside of these hours and cannot wait until we are again available, contact your PCP or go to the hospital emergency room. PLEASE NOTE: IF YOU ARE UNABLE TO OBTAIN WOUND SUPPLIES, CONTINUE TO USE THE SUPPLIES YOU HAVE AVAILABLE UNTIL YOU ARE ABLE TO REACH US. IT IS MOST IMPORTANT TO KEEP THE WOUND COVERED AT ALL TIMES. [x] Dr. Caridad Valerio  
[] Dr. Brenda Gomez 
[] Melanie [facility] [] Dr. Dylan Danielson

## 2021-05-17 NOTE — DISCHARGE INSTRUCTIONS
Discharge Instructions  [address] [place] [address], [address] [place] [address]  Telephone: [phone]    NAME:  Emma Madison OF BIRTH:  1955  MEDICAL RECORD NUMBER:  [id number]  DATE: 5/17/2021      Return Appointment:  [] Dressing supply provider:   [] Home Healthcare:    [x] Return Appointment: 1 Week(s) Tuesday  [x] Nurse Visit:   Thursday    [] Discharge from [facility]  [] Ordered tests:   [] Referrals:   [] Rx:     Wound Cleansing:   Do not scrub or use excessive force. Cleanse wound prior to applying a clean dressing with:  [x] Normal Saline   [x] Mild Soap & Water    [] Keep Wound Dry in Shower  [] Other:      Topical Treatments:  Do not apply lotions, creams, or ointments to wound bed unless directed. [x] Apply moisturizing lotion to skin surrounding the wound prior to dressing change.  [] Apply antifungal ointment to skin surrounding the wound prior to dressing change.  [] Apply thin film of moisture barrier ointment to skin immediately around wound. [x] Other:  Betadine to israel-wound     Dressings:           Wound Location Left leg   [x] Apply Primary Dressing:       [] MediHoney Gel    [] MediHoney Alginate               [] Calcium Alginate      [x] Calcium Alginate with Silver   [] Collagen                   [] Collagen with Silver                [] Santyl with Moistened saline gauze              [] Hydrofera Blue (cut to size and moistened)  [] Hydrofera Blue Ready (Cut to size)   [] NS wet to dry    [] Betadine wet to dry              [] Hydrogel                [] Mepitel     [] Bactroban/Mupirocin               [x] Other: Drawtex    [x] Cover and Secure with:     [x] Gauze [] Taffy Priestly [] Kerlix   [] Foam [x] Super Absorbant [] ABD     [] ACE Wrap            [] Other:    Avoid contact of tape with skin.     [x] Change dressing: [] Daily    [] Every Other Day [] Once per week   [x] Twice per week   [] Three times per week [] Do Not Change Dressing   [] Other:     Compression:  Apply: [x] Multilayer Compression Wrap: []RightLeg [x]Left Leg                                 []Profore  []Profore Lite             [x]Unna     [x] Do not get leg(s) with wrap wet. If wraps become too tight call the center or completely remove the wrap. [x] Elevate leg(s) above the level of the heart when sitting. [x] Avoid prolonged standing in one place. Dietary:  [x] Diet as tolerated: [] Calorie Diabetic Diet: [x] No Added Salt:  [] Increase Protein: [] Other:     Activity:  [x] Activity as tolerated:    [] Patient has no activity restrictions       [] Strict Bedrest:   [] Remain off Work:       [] May return to full duty work:                                     [] Return to work with restrictions:               [address] Information: Should you experience any significant changes in your wound(s) or have questions about your wound care, please contact Thais Caldera at [place] [address] 8:00 am - 4:30. If you need help with your wound outside of these hours and cannot wait until we are again available, contact your PCP or go to the hospital emergency room. PLEASE NOTE: IF YOU ARE UNABLE TO OBTAIN WOUND SUPPLIES, CONTINUE TO USE THE SUPPLIES YOU HAVE AVAILABLE UNTIL YOU ARE ABLE TO REACH US. IT IS MOST IMPORTANT TO KEEP THE WOUND COVERED AT ALL TIMES.     [x] Dr. Evelyn Sicard   [] Dr. Jose Marks  [] Elvin [facility]  [] Dr. Toshia Ramirez

## 2021-05-17 NOTE — WOUND CARE
[employer] Below Knee NAME:  Araseli Hays YOB: 1955 MEDICAL RECORD NUMBER:  [id number] DATE:  5/17/2021 Remove old Unna Boot or Boots. Appied primary and secondary dressing as ordered. Applied Unna roll from toes to knee overlapping each time. Applied ace wrap or coban from toes to below the knee. Secured with tape and/or metal clips covered with tape. Instructed patient/caregiver to keep dressing dry and intact. DO NOT REMOVE DRESSING. Instructed pt/family/caregiver to report excessive draining, loose bandage, wet dressing, severe pain or tingling in toes. Applied [place] dressing below the knee to left lower leg. Unna Boot(s) were applied per  Guidelines. Response to treatment: Well tolerated by patient  Electronically signed by Alison Suarez RN on 5/17/2021 at 2:44 PM

## 2021-05-20 ENCOUNTER — HOSPITAL ENCOUNTER (OUTPATIENT)
Dept: WOUND CARE | Age: 66
Discharge: HOME | End: 2021-05-20
Admitting: SPECIALIST
Payer: MEDICARE

## 2021-05-20 VITALS
SYSTOLIC BLOOD PRESSURE: 145 MMHG | OXYGEN SATURATION: 98 % | RESPIRATION RATE: 18 BRPM | TEMPERATURE: 97.4 F | DIASTOLIC BLOOD PRESSURE: 81 MMHG | HEART RATE: 83 BPM

## 2021-05-20 DIAGNOSIS — R60.0 LOCALIZED EDEMA: ICD-10-CM

## 2021-05-20 DIAGNOSIS — I87.312 IDIOPATHIC CHRONIC VENOUS HYPERTENSION OF LEFT LOWER EXTREMITY WITH ULCER (HCC): ICD-10-CM

## 2021-05-20 DIAGNOSIS — L97.929 IDIOPATHIC CHRONIC VENOUS HYPERTENSION OF LEFT LOWER EXTREMITY WITH ULCER (HCC): ICD-10-CM

## 2021-05-20 PROCEDURE — 29580 STRAPPING UNNA BOOT: CPT

## 2021-05-20 NOTE — WOUND CARE
[employer] Below Knee NAME:  Araseli Hays YOB: 1955 MEDICAL RECORD NUMBER:  [id number] DATE:  5/20/2021 Remove old Unna Boot or Boots. Applied moisturizing agent to dry skin as needed. Appied primary and secondary dressing as ordered. Applied Unna roll from toes to knee overlapping each time. Applied ace wrap or coban from toes to below the knee. Secured with tape and/or metal clips covered with tape. Instructed patient/caregiver to keep dressing dry and intact. DO NOT REMOVE DRESSING. Instructed pt/family/caregiver to report excessive draining, loose bandage, wet dressing, severe pain or tingling in toes. Applied [place] dressing below the knee to left lower leg. Unna Boot(s) were applied per  Guidelines. Response to treatment: Well tolerated by patient  Electronically signed by Ann Melvin RN on 5/20/2021 at 1:55 PM

## 2021-05-25 ENCOUNTER — HOSPITAL ENCOUNTER (OUTPATIENT)
Dept: WOUND CARE | Age: 66
Discharge: HOME | End: 2021-05-25
Admitting: SPECIALIST
Payer: MEDICARE

## 2021-05-25 VITALS
TEMPERATURE: 98.1 F | DIASTOLIC BLOOD PRESSURE: 70 MMHG | SYSTOLIC BLOOD PRESSURE: 140 MMHG | RESPIRATION RATE: 18 BRPM | HEART RATE: 85 BPM

## 2021-05-25 DIAGNOSIS — I87.312 IDIOPATHIC CHRONIC VENOUS HYPERTENSION OF LEFT LOWER EXTREMITY WITH ULCER (HCC): ICD-10-CM

## 2021-05-25 DIAGNOSIS — R60.0 LOCALIZED EDEMA: ICD-10-CM

## 2021-05-25 DIAGNOSIS — L97.929 IDIOPATHIC CHRONIC VENOUS HYPERTENSION OF LEFT LOWER EXTREMITY WITH ULCER (HCC): ICD-10-CM

## 2021-05-25 PROCEDURE — 11045 DBRDMT SUBQ TISS EACH ADDL: CPT

## 2021-05-25 PROCEDURE — 11042 DBRDMT SUBQ TIS 1ST 20SQCM/<: CPT

## 2021-05-25 PROCEDURE — 29580 STRAPPING UNNA BOOT: CPT

## 2021-05-25 RX ORDER — LIDOCAINE HYDROCHLORIDE 20 MG/ML
15 SOLUTION OROPHARYNGEAL AS NEEDED
Status: DISCONTINUED | OUTPATIENT
Start: 2021-05-25 | End: 2021-05-27 | Stop reason: HOSPADM

## 2021-05-25 NOTE — WOUND CARE
[address] Progress Note and Procedure Note Aaron George MEDICAL RECORD NUMBER:  [id number] AGE: 76 y.o. RACE WHITE  GENDER: male  : 1955 EPISODE DATE:  2021 Subjective:  
Patient continues to complain of severe pain in his left leg, not improved by elevation Has a new wound on his right lateral leg Chief Complaint Patient presents with  Wound Check  
  left and right lower legs HISTORY of PRESENT ILLNESS HPI Aaron George is a 76 y.o. male who presents today for wound/ulcer evaluation. History of Wound Context: L & R leg Wound/Ulcer Pain Timing/Severity: constant and severe Quality of pain: burning and sharp Severity:  8 / 10 Modifying Factors: Pain is relieved/improved with rest 
Associated Signs/Symptoms: edema Ulcer Identification: 
Ulcer Type: venous Contributing Factors: edema, lymphedema, diabetes and obesity Wound: L & R leg PAST MEDICAL HISTORY Past Medical History:  
Diagnosis Date  Arthritis  Chronic obstructive pulmonary disease ([facility]  Diabetes ([facility]  Gout  Hypertension  Sleep apnea PAST SURGICAL HISTORY Past Surgical History:  
Procedure Laterality Date  HX ORTHOPAEDIC    
 HX VEIN ABLATION ADHESIVE    
 
 
FAMILY HISTORY Family History Problem Relation Age of Onset  Heart Disease Mother  Kidney Disease Mother  Hypertension Mother  Diabetes Mother  Heart Disease Father  Hypertension Father  Diabetes Sister  Diabetes Brother SOCIAL HISTORY Social History Tobacco Use  Smoking status: Never Smoker  Smokeless tobacco: Never Used Substance Use Topics  Alcohol use: Yes  Drug use: Never ALLERGIES Allergies Allergen Reactions  Elavil Unknown (comments)  Sulfa (Sulfonamide Antibiotics) Nausea and Vomiting MEDICATIONS Current Outpatient Medications on File Prior to Encounter Medication Sig Dispense Refill  ampicillin (PRINCIPEN) 500 mg capsule Take 1 Cap by mouth Before breakfast, lunch, dinner and at bedtime. 40 Cap 0  
 bumetanide (BUMEX) 2 mg tablet Take 2 mg by mouth two (2) times a day.  multivits,Stress Formula-Zinc tablet Take 1 Tab by mouth daily.  vitamin e (E GEMS) 1,000 unit capsule Take 1,000 Units by mouth two (2) times a day.  multivitamin (ONE A DAY) tablet Take 1 Tab by mouth daily.  ferrous sulfate (Iron) 325 mg (65 mg iron) tablet Take  by mouth Daily (before breakfast).  aspirin 81 mg chewable tablet Take 81 mg by mouth daily.  flunisolide (NASAREL) 25 mcg (0.025 %) spry 2 Sprays two (2) times a day.  albuterol (ProAir HFA) 90 mcg/actuation inhaler Take  by inhalation.  colchicine 0.6 mg tablet Take 0.6 mg by mouth daily.  gabapentin (NEURONTIN) 300 mg capsule Take 300 mg by mouth four (4) times daily.  oxyCODONE IR (ROXICODONE) 5 mg immediate release tablet Take 5 mg by mouth every twelve (12) hours.  metFORMIN (GLUCOPHAGE) 500 mg tablet Take 1,000 mg by mouth two (2) times daily (with meals).  hydroCHLOROthiazide (HYDRODIURIL) 25 mg tablet Take 25 mg by mouth daily.  insulin lispro (HumaLOG U-100 Insulin) 100 unit/mL injection by SubCUTAneous route. Sliding scale  insulin glargine (LANTUS) 100 unit/mL injection by SubCUTAneous route nightly. 80units am/ 20units pm    
 
No current facility-administered medications on file prior to encounter. REVIEW OF SYSTEMS Pertinent items are noted in HPI. Objective:  
 
Visit Vitals BP (!) 140/70 (BP Patient Position: At rest;Sitting) Pulse 85 Temp 98.1 °F (36.7 °C) Resp 18 Wt Readings from Last 3 Encounters:  
03/29/21 136.1 kg (300 lb)  
11/23/20 (!) 166.5 kg (367 lb) PHYSICAL EXAM 
There is marked edema of the left lower extremity, the left lateral leg wound is larger No evidence of infection There is a smaller right lateral leg wound, no evidence of infection Pertinent items are noted in HPI. Assessment:  
Left leg wound continues to get worse despite various different approaches Has seen Dr. Adele Pacheco in the past, has had venous procedures Problem List Items Addressed This Visit Idiopathic chronic venous hypertension of left lower extremity with ulcer ([address] Localized edema POP IN PROCEDURE TYPE (DEBRIDEMENT, BIOPSY, I&D, SKIN SUB, CHEMICAL CAUERTY) Plan:  
Follow-up with Dr. Adele Pacheco Start Exufiber on new right leg wound with Unna boot Stop Exufiber on the left side and dressed the left leg with double drawtex and Unna boot Treatment Note please see attached Discharge Instructions Written patient dismissal instructions given to patient or POA. Electronically signed by Prashant Morales MD on 5/25/2021 at 2:31 PM 
 
 
 
 
 
 
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 Performed by: Prashant Morales MD 
 
Consent obtained: Yes Time out taken: Yes Debridement: Excisional 
 
Using curette the wound(s)/ulcer(s) was/were sharply debrided down through and including the removal of   
dermis and subcutaneous tissue Devitalized Tissue Debrided: slough Pre Debridement Measurements: 
Are located in the [place]  Documentation Flow Sheet Non-Pressure ulcer, fat layer exposed Wound/Ulcer #: 1 Post Debridement Measurements: 
Wound/Ulcer Descriptions are Pre Debridement except measurements: 
 
Wound Leg lower Left;Lateral #1 (Active) Wound Image   05/25/21 1352 Wound Etiology Venous 05/25/21 1352 Dressing Status Breakthrough drainage noted 05/25/21 1352 Cleansed Soap and water 05/25/21 1352 Dressing/Treatment Obi [email or web address] 10/26/20 1002 Offloading for Diabetic Foot Ulcers No 05/17/21 1348 Wound Length (cm) 15 cm 05/25/21 1352 Wound Width (cm) 12 cm 05/25/21 1352 Wound Depth (cm) 0.2 cm 05/25/21 1352 Wound Surface Area (cm^2) 180 cm^2 05/25/21 1352 Change in Wound Size % -602.58 05/25/21 1352 Wound Volume (cm^3) 36 cm^3 05/25/21 1352 Wound Healing % -603 05/25/21 1352 Post-Procedure Length (cm) 15 cm 05/25/21 1413 Post-Procedure Width (cm) 12 cm 05/25/21 1413 Post-Procedure Depth (cm) 0.2 cm 05/25/21 1413 Post-Procedure Surface Area (cm^2) 180 cm^2 05/25/21 1413 Post-Procedure Volume (cm^3) 36 cm^3 05/25/21 1413 Wound Assessment Slough;Pink/red;Granulation tissue 05/25/21 1352 Drainage Amount Large 05/25/21 1352 Drainage Description Serosanguinous 05/25/21 1352 Wound Odor Moderate 05/25/21 1352 Alissa-Wound/Incision Assessment Maceration; Excoriated 05/25/21 1352 Edges Attached edges 05/25/21 1352 Wound Thickness Description Full thickness 05/25/21 1352 Number of days: 253 Wound Leg lower Right;Lateral #2 (Active) Wound Image   05/25/21 1353 Wound Etiology Venous 05/25/21 1353 Dressing Status Clean;Dry; Intact 05/25/21 1353 Cleansed Cleansed with saline 05/25/21 1353 Offloading for Diabetic Foot Ulcers No offloading required 05/25/21 1355 Wound Length (cm) 8 cm 05/25/21 1353 Wound Width (cm) 4.5 cm 05/25/21 1353 Wound Depth (cm) 0.1 cm 05/25/21 1353 Wound Surface Area (cm^2) 36 cm^2 05/25/21 1353 Wound Volume (cm^3) 3.6 cm^3 05/25/21 1353 Wound Assessment Slough;Granulation tissue 05/25/21 1353 Drainage Description Serosanguinous 05/25/21 1353 Wound Odor Moderate 05/25/21 1353 Alissa-Wound/Incision Assessment Excoriated 05/25/21 1353 Edges Attached edges 05/25/21 1353 Wound Thickness Description Full thickness 05/25/21 1353 Number of days: 0 Total Surface Area Debrided:  180 sq cm Estimated Blood Loss:  None Hemostasis Achieved: Pressure Procedural Pain: 0 / 10 Post Procedural Pain: 0 / 10 Response to treatment: Well tolerated by patient

## 2021-05-25 NOTE — DISCHARGE INSTRUCTIONS
Discharge Instructions  [address] [place] [address], [address] [place] [address]  Telephone: [phone]    NAME:  Matilde Sena OF BIRTH:  1955  MEDICAL RECORD NUMBER:  [id number]  DATE: 5/25/2021      Return Appointment:  [] Dressing supply provider:   [] Home Healthcare:    [x] Return Appointment: 1 Week(s)  [x] Nurse Visit:   Thursday    [] Discharge from [facility]  [] Ordered tests:   [] Referrals:   [] Rx:     Wound Cleansing:   Do not scrub or use excessive force. Cleanse wound prior to applying a clean dressing with:  [x] Normal Saline   [x] Mild Soap & Water    [] Keep Wound Dry in Shower  [] Other:      Topical Treatments:  Do not apply lotions, creams, or ointments to wound bed unless directed. [x] Apply moisturizing lotion to skin surrounding the wound prior to dressing change.  [] Apply antifungal ointment to skin surrounding the wound prior to dressing change.  [] Apply thin film of moisture barrier ointment to skin immediately around wound. [x] Other:  Betadine to israel-wound     Dressings:           Wound Location Left leg   [x] Apply Primary Dressing:       [] MediHoney Gel    [] MediHoney Alginate               [] Calcium Alginate      [] Calcium Alginate with Silver   [] Collagen                   [] Collagen with Silver                [] Santyl with Moistened saline gauze              [] Hydrofera Blue (cut to size and moistened)  [] Hydrofera Blue Ready (Cut to size)   [] NS wet to dry    [] Betadine wet to dry              [] Hydrogel                [] Mepitel     [] Bactroban/Mupirocin               [x] Other: Double Drawtex    [x] Cover and Secure with:     [x] Gauze [] Efren Patrice [] Kerlix   [] Foam [x] Super Absorbant [] ABD     [] ACE Wrap            [] Other:    Avoid contact of tape with skin.     [x] Change dressing: [] Daily    [] Every Other Day [] Once per week   [x] Twice per week   [] Three times per week [] Do Not Change Dressing   [] Other:    Dressings:           Wound Location Right leg   [x] Apply Primary Dressing:       [] MediHoney Gel    [] MediHoney Alginate               [] Calcium Alginate      [x] Calcium Alginate with Silver   [] Collagen                   [] Collagen with Silver                [] Santyl with Moistened saline gauze              [] Hydrofera Blue (cut to size and moistened)  [] Hydrofera Blue Ready (Cut to size)   [] NS wet to dry    [] Betadine wet to dry              [] Hydrogel                [] Mepitel     [] Bactroban/Mupirocin               [] Other: Drawtex    [x] Cover and Secure with:     [x] Gauze [] Kamla Lamprey [] Kerlix   [] Foam [] Super Absorbant [] ABD     [] ACE Wrap            [] Other:    Avoid contact of tape with skin. [x] Change dressing: [] Daily    [] Every Other Day [] Once per week   [x] Twice per week   [] Three times per week [] Do Not Change Dressing   [] Other:     Compression:  Apply: [x] Multilayer Compression Wrap: [x]RightLeg [x]Left Leg                                 []Profore  []Profore Lite             [x]Unna     [x] Do not get leg(s) with wrap wet. If wraps become too tight call the center or completely remove the wrap. [x] Elevate leg(s) above the level of the heart when sitting. [x] Avoid prolonged standing in one place. Dietary:  [x] Diet as tolerated: [] Calorie Diabetic Diet: [x] No Added Salt:  [] Increase Protein: [] Other:     Activity:  [x] Activity as tolerated:    [] Patient has no activity restrictions       [] Strict Bedrest:   [] Remain off Work:       [] May return to full duty work:                                     [] Return to work with restrictions:               [address] Information: Should you experience any significant changes in your wound(s) or have questions about your wound care, please contact Thais Caldera at [facility] 4283 8:00 am - 4:30.   If you need help with your wound outside of these hours and cannot wait until we are again available, contact your PCP or go to the hospital emergency room. PLEASE NOTE: IF YOU ARE UNABLE TO OBTAIN WOUND SUPPLIES, CONTINUE TO USE THE SUPPLIES YOU HAVE AVAILABLE UNTIL YOU ARE ABLE TO REACH US. IT IS MOST IMPORTANT TO KEEP THE WOUND COVERED AT ALL TIMES.     [x] Dr. Anita Bonilla   [] Dr. Alexander Ortiz  [] [facility]  [] Dr. Matt Sneed

## 2021-05-25 NOTE — WOUND CARE
Discharge Instructions [address] Telephone: [phone] NAME:  Aaron George YOB: 1955 MEDICAL RECORD NUMBER:  [id number] DATE: 5/25/2021 Return Appointment: 
[] Dressing supply provider:  
[] Home Healthcare:   
[x] Return Appointment: 1 Week(s) [x] Nurse Visit:   Thursday 
 
[] Discharge from [facility] [] Ordered tests:  
[] Referrals:  
[] Rx:  
 
Wound Cleansing: Do not scrub or use excessive force. Cleanse wound prior to applying a clean dressing with: 
[x] Normal Saline  
[x] Mild Soap & Water   
[] Keep Wound Dry in Shower 
[] Other:   
 
Topical Treatments: Do not apply lotions, creams, or ointments to wound bed unless directed. [x] Apply moisturizing lotion to skin surrounding the wound prior to dressing change. 
[] Apply antifungal ointment to skin surrounding the wound prior to dressing change. 
[] Apply thin film of moisture barrier ointment to skin immediately around wound. [x] Other:  Betadine to israel-wound Dressings:           Wound Location Left leg [x] Apply Primary Dressing:     
 [] MediHoney Gel    [] MediHoney Alginate  
            [] Calcium Alginate      [] Calcium Alginate with Silver 
 [] Collagen                   [] Collagen with Silver   
            [] Santyl with Moistened saline gauze 
            [] Hydrofera Blue (cut to size and moistened)  [] Hydrofera Blue Ready (Cut to size) [] NS wet to dry    [] Betadine wet to dry 
            [] Hydrogel                [] Mepitel   
 [] Bactroban/Mupirocin  
            [x] Other: Double Drawtex [x] Cover and Secure with:   
 [x] Gauze [] Cristian [] Kerlix [] Foam [x] Super Absorbant [] ABD [] ACE Wrap            [] Other:  
 Avoid contact of tape with skin.  
 
[x] Change dressing: [] Daily    [] Every Other Day [] Once per week   [x] Twice per week 
 [] Three times per week [] Do Not Change Dressing   [] Other: 
 
Dressings:           Wound Location Right leg [x] Apply Primary Dressing:     
 [] MediHoney Gel    [] MediHoney Alginate  
            [] Calcium Alginate      [x] Calcium Alginate with Silver 
 [] Collagen                   [] Collagen with Silver   
            [] Santyl with Moistened saline gauze 
            [] Hydrofera Blue (cut to size and moistened)  [] Hydrofera Blue Ready (Cut to size) [] NS wet to dry    [] Betadine wet to dry 
            [] Hydrogel                [] Mepitel   
 [] Bactroban/Mupirocin  
            [] Other: Drawtex [x] Cover and Secure with:   
 [x] Gauze [] Cristian [] Kerlix [] Foam [] Super Absorbant [] ABD [] ACE Wrap            [] Other:  
 Avoid contact of tape with skin. [x] Change dressing: [] Daily    [] Every Other Day [] Once per week   [x] Twice per week 
 [] Three times per week [] Do Not Change Dressing   [] Other: 
  
Compression: 
Apply: [x] Multilayer Compression Wrap: [x]RightLeg [x]Left Leg 
                               []Profore  []Profore Lite             [x]Unna [x] Do not get leg(s) with wrap wet. If wraps become too tight call the center or completely remove the wrap. [x] Elevate leg(s) above the level of the heart when sitting. [x] Avoid prolonged standing in one place. Dietary: 
[x] Diet as tolerated: [] Calorie Diabetic Diet: [x] No Added Salt: 
[] Increase Protein: [] Other:  
 
Activity: 
[x] Activity as tolerated:   
[] Patient has no activity restrictions [] Strict Bedrest:  
[] Remain off Work:      
[] May return to full duty work:                                    
[] Return to work with restrictions:  
         
 
[address] Information: Should you experience any significant changes in your wound(s) or have questions about your wound care, please contact Thais Caldera at [facility] 1146 8:00 am - 4:30.   If you need help with your wound outside of these hours and cannot wait until we are again available, contact your PCP or go to the hospital emergency room. PLEASE NOTE: IF YOU ARE UNABLE TO OBTAIN WOUND SUPPLIES, CONTINUE TO USE THE SUPPLIES YOU HAVE AVAILABLE UNTIL YOU ARE ABLE TO REACH US. IT IS MOST IMPORTANT TO KEEP THE WOUND COVERED AT ALL TIMES. [x] Dr. Tanisha Andrews  
[] Dr. Valerie Briscoe 
[] Genaro Troy [facility] [] Dr. Hedy Matute

## 2021-05-25 NOTE — WOUND CARE
[employer] Below Knee NAME:  Margareth Spear YOB: 1955 MEDICAL RECORD NUMBER:  [id number] DATE:  5/25/2021 Remove old Unna Boot or Boots. Appied primary and secondary dressing as ordered. Applied Unna roll from toes to knee overlapping each time. Applied ace wrap or coban from toes to below the knee. Secured with tape and/or metal clips covered with tape. Instructed patient/caregiver to keep dressing dry and intact. DO NOT REMOVE DRESSING. Instructed pt/family/caregiver to report excessive draining, loose bandage, wet dressing, severe pain or tingling in toes. Applied [employer] dressing below the knee to bilateral lower legs. Unna Boot(s) were applied per  Guidelines. Response to treatment: Well tolerated by patient  Electronically signed by Melva Diaz LPN on 9/26/7890 at 5:47 PM

## 2021-05-27 ENCOUNTER — HOSPITAL ENCOUNTER (OUTPATIENT)
Dept: WOUND CARE | Age: 66
Discharge: HOME | End: 2021-05-27

## 2021-05-27 VITALS
OXYGEN SATURATION: 97 % | DIASTOLIC BLOOD PRESSURE: 66 MMHG | RESPIRATION RATE: 20 BRPM | HEART RATE: 88 BPM | SYSTOLIC BLOOD PRESSURE: 124 MMHG | TEMPERATURE: 97.5 F

## 2021-06-01 ENCOUNTER — HOSPITAL ENCOUNTER (INPATIENT)
Age: 66
LOS: 7 days | Discharge: HOME HEALTH | DRG: 299 | End: 2021-06-09
Attending: EMERGENCY MEDICINE | Admitting: FAMILY MEDICINE
Payer: MEDICARE

## 2021-06-01 ENCOUNTER — HOSPITAL ENCOUNTER (OUTPATIENT)
Dept: WOUND CARE | Age: 66
Discharge: HOME | End: 2021-06-01
Admitting: SPECIALIST
Payer: MEDICARE

## 2021-06-01 ENCOUNTER — APPOINTMENT (OUTPATIENT)
Dept: GENERAL RADIOLOGY | Age: 66
DRG: 299 | End: 2021-06-01
Attending: EMERGENCY MEDICINE
Payer: MEDICARE

## 2021-06-01 VITALS
RESPIRATION RATE: 20 BRPM | HEART RATE: 89 BPM | TEMPERATURE: 97.8 F | DIASTOLIC BLOOD PRESSURE: 77 MMHG | SYSTOLIC BLOOD PRESSURE: 139 MMHG

## 2021-06-01 DIAGNOSIS — M79.604 PAIN IN BOTH LOWER EXTREMITIES: ICD-10-CM

## 2021-06-01 DIAGNOSIS — I87.2 VENOUS STASIS ULCER OF OTHER PART OF LOWER LEG WITHOUT VARICOSE VEINS, UNSPECIFIED LATERALITY, UNSPECIFIED ULCER STAGE (HCC): ICD-10-CM

## 2021-06-01 DIAGNOSIS — U07.1 COVID-19: ICD-10-CM

## 2021-06-01 DIAGNOSIS — L03.90 CELLULITIS, UNSPECIFIED CELLULITIS SITE: Primary | ICD-10-CM

## 2021-06-01 DIAGNOSIS — I50.9 CONGESTIVE HEART FAILURE, UNSPECIFIED HF CHRONICITY, UNSPECIFIED HEART FAILURE TYPE (HCC): ICD-10-CM

## 2021-06-01 DIAGNOSIS — M79.605 PAIN IN BOTH LOWER EXTREMITIES: ICD-10-CM

## 2021-06-01 DIAGNOSIS — I87.312 IDIOPATHIC CHRONIC VENOUS HYPERTENSION OF LEFT LOWER EXTREMITY WITH ULCER (HCC): ICD-10-CM

## 2021-06-01 DIAGNOSIS — L97.809 VENOUS STASIS ULCER OF OTHER PART OF LOWER LEG WITHOUT VARICOSE VEINS, UNSPECIFIED LATERALITY, UNSPECIFIED ULCER STAGE (HCC): ICD-10-CM

## 2021-06-01 DIAGNOSIS — L97.929 IDIOPATHIC CHRONIC VENOUS HYPERTENSION OF LEFT LOWER EXTREMITY WITH ULCER (HCC): ICD-10-CM

## 2021-06-01 DIAGNOSIS — R60.0 LOCALIZED EDEMA: ICD-10-CM

## 2021-06-01 LAB
ANION GAP SERPL CALC-SCNC: 10 MMOL/L (ref 5–15)
BASOPHILS # BLD: 0 K/UL (ref 0–0.1)
BASOPHILS NFR BLD: 0 % (ref 0–1)
BNP SERPL-MCNC: 1545 PG/ML
BUN SERPL-MCNC: 34 MG/DL (ref 6–20)
BUN/CREAT SERPL: 20 (ref 12–20)
CA-I BLD-MCNC: 9.4 MG/DL (ref 8.5–10.1)
CHLORIDE SERPL-SCNC: 101 MMOL/L (ref 97–108)
CO2 SERPL-SCNC: 27 MMOL/L (ref 21–32)
CREAT SERPL-MCNC: 1.7 MG/DL (ref 0.7–1.3)
DIFFERENTIAL METHOD BLD: ABNORMAL
EOSINOPHIL # BLD: 0.1 K/UL (ref 0–0.4)
EOSINOPHIL NFR BLD: 1 % (ref 0–7)
ERYTHROCYTE [DISTWIDTH] IN BLOOD BY AUTOMATED COUNT: 15 % (ref 11.5–14.5)
GLUCOSE SERPL-MCNC: 169 MG/DL (ref 65–100)
HCT VFR BLD AUTO: 39.9 % (ref 36.6–50.3)
HGB BLD-MCNC: 12.3 G/DL (ref 12.1–17)
IMM GRANULOCYTES # BLD AUTO: 0 K/UL (ref 0–0.04)
IMM GRANULOCYTES NFR BLD AUTO: 0 % (ref 0–0.5)
LACTATE SERPL-SCNC: 2.8 MMOL/L (ref 0.4–2)
LYMPHOCYTES # BLD: 1.5 K/UL (ref 0.8–3.5)
LYMPHOCYTES NFR BLD: 15 % (ref 12–49)
MCH RBC QN AUTO: 27.9 PG (ref 26–34)
MCHC RBC AUTO-ENTMCNC: 30.8 G/DL (ref 30–36.5)
MCV RBC AUTO: 90.5 FL (ref 80–99)
MONOCYTES # BLD: 0.9 K/UL (ref 0–1)
MONOCYTES NFR BLD: 9 % (ref 5–13)
NEUTS SEG # BLD: 7.7 K/UL (ref 1.8–8)
NEUTS SEG NFR BLD: 75 % (ref 32–75)
NRBC # BLD: 0 K/UL (ref 0–0.01)
NRBC BLD-RTO: 0 PER 100 WBC
PLATELET # BLD AUTO: 329 K/UL (ref 150–400)
PMV BLD AUTO: 10.7 FL (ref 8.9–12.9)
POTASSIUM SERPL-SCNC: 3.8 MMOL/L (ref 3.5–5.1)
RBC # BLD AUTO: 4.41 M/UL (ref 4.1–5.7)
SODIUM SERPL-SCNC: 138 MMOL/L (ref 136–145)
TROPONIN I SERPL-MCNC: <0.05 NG/ML
WBC # BLD AUTO: 10.3 K/UL (ref 4.1–11.1)

## 2021-06-01 PROCEDURE — 84484 ASSAY OF TROPONIN QUANT: CPT

## 2021-06-01 PROCEDURE — 83605 ASSAY OF LACTIC ACID: CPT

## 2021-06-01 PROCEDURE — 74011250636 HC RX REV CODE- 250/636: Performed by: EMERGENCY MEDICINE

## 2021-06-01 PROCEDURE — 99213 OFFICE O/P EST LOW 20 MIN: CPT

## 2021-06-01 PROCEDURE — 96366 THER/PROPH/DIAG IV INF ADDON: CPT

## 2021-06-01 PROCEDURE — 99285 EMERGENCY DEPT VISIT HI MDM: CPT

## 2021-06-01 PROCEDURE — 80048 BASIC METABOLIC PNL TOTAL CA: CPT

## 2021-06-01 PROCEDURE — 85025 COMPLETE CBC W/AUTO DIFF WBC: CPT

## 2021-06-01 PROCEDURE — 36415 COLL VENOUS BLD VENIPUNCTURE: CPT

## 2021-06-01 PROCEDURE — 87040 BLOOD CULTURE FOR BACTERIA: CPT

## 2021-06-01 PROCEDURE — 99218 HC RM OBSERVATION: CPT

## 2021-06-01 PROCEDURE — 93005 ELECTROCARDIOGRAM TRACING: CPT

## 2021-06-01 PROCEDURE — 96365 THER/PROPH/DIAG IV INF INIT: CPT

## 2021-06-01 PROCEDURE — 96375 TX/PRO/DX INJ NEW DRUG ADDON: CPT

## 2021-06-01 PROCEDURE — 83880 ASSAY OF NATRIURETIC PEPTIDE: CPT

## 2021-06-01 PROCEDURE — 96376 TX/PRO/DX INJ SAME DRUG ADON: CPT

## 2021-06-01 PROCEDURE — 71045 X-RAY EXAM CHEST 1 VIEW: CPT

## 2021-06-01 RX ORDER — HYDROMORPHONE HYDROCHLORIDE 1 MG/ML
1 INJECTION, SOLUTION INTRAMUSCULAR; INTRAVENOUS; SUBCUTANEOUS ONCE
Status: COMPLETED | OUTPATIENT
Start: 2021-06-01 | End: 2021-06-01

## 2021-06-01 RX ORDER — LIDOCAINE HYDROCHLORIDE 20 MG/ML
15 SOLUTION OROPHARYNGEAL AS NEEDED
Status: DISCONTINUED | OUTPATIENT
Start: 2021-06-01 | End: 2021-06-03 | Stop reason: HOSPADM

## 2021-06-01 RX ADMIN — VANCOMYCIN HYDROCHLORIDE 1000 MG: 1 INJECTION, POWDER, LYOPHILIZED, FOR SOLUTION INTRAVENOUS at 22:23

## 2021-06-01 RX ADMIN — HYDROMORPHONE HYDROCHLORIDE 1 MG: 1 INJECTION, SOLUTION INTRAMUSCULAR; INTRAVENOUS; SUBCUTANEOUS at 22:15

## 2021-06-01 RX ADMIN — HYDROMORPHONE HYDROCHLORIDE 1 MG: 1 INJECTION, SOLUTION INTRAMUSCULAR; INTRAVENOUS; SUBCUTANEOUS at 21:11

## 2021-06-01 NOTE — WOUND CARE
Discharge Instructions  [address] [place] [address], [address] [place] [address]  Telephone: [phone]    NAME:  Iam Reyez OF BIRTH:  1955  MEDICAL RECORD NUMBER:  [id number]  DATE: 6/1/2021      Return Appointment:  [] Dressing supply provider:   [] Home Healthcare:    [x] Return Appointment: after discharged from the hospital  [] Nurse Visit:   Thursday    [] Discharge from [facility]  [] Ordered tests:   [x] Referrals: Pt. To go to the ER   [] Rx:     Wound Cleansing:   Do not scrub or use excessive force. Cleanse wound prior to applying a clean dressing with:  [x] Normal Saline   [x] Mild Soap & Water    [] Keep Wound Dry in Shower  [] Other:      Topical Treatments:  Do not apply lotions, creams, or ointments to wound bed unless directed. [] Apply moisturizing lotion to skin surrounding the wound prior to dressing change.  [] Apply antifungal ointment to skin surrounding the wound prior to dressing change.  [] Apply thin film of moisture barrier ointment to skin immediately around wound. [] Other:  Betadine to israel-wound     Dressings:           Wound Location Left leg   [x] Apply Primary Dressing:       [] MediHoney Gel    [] MediHoney Alginate               [] Calcium Alginate      [] Calcium Alginate with Silver   [] Collagen                   [] Collagen with Silver                [] Santyl with Moistened saline gauze              [] Hydrofera Blue (cut to size and moistened)  [] Hydrofera Blue Ready (Cut to size)   [x] NS wet to dry    [] Betadine wet to dry              [] Hydrogel                [] Mepitel     [] Bactroban/Mupirocin               [] Other: Double Drawtex    [x] Cover and Secure with:     [x] Gauze [] Prema Pa [] Kerlix   [] Foam [] Super Absorbant [] ABD     [x] ACE Wrap            [] Other:    Avoid contact of tape with skin.     [x] Change dressing: [] Daily    [] Every Other Day [] Once per week   [] Twice per week   [] Three times per week [] Do Not Change Dressing   [] Other:    Dressings:           Wound Location Right leg   [x] Apply Primary Dressing:       [] MediHoney Gel    [] MediHoney Alginate               [] Calcium Alginate      [] Calcium Alginate with Silver   [] Collagen                   [] Collagen with Silver                [] Santyl with Moistened saline gauze              [] Hydrofera Blue (cut to size and moistened)  [] Hydrofera Blue Ready (Cut to size)   [x] NS wet to dry    [] Betadine wet to dry              [] Hydrogel                [] Mepitel     [] Bactroban/Mupirocin               [] Other: Drawtex    [x] Cover and Secure with:     [x] Gauze [] Chris Roers [] Kerlix   [] Foam [] Super Absorbant [] ABD     [x] ACE Wrap            [] Other:    Avoid contact of tape with skin. [x] Change dressing: [] Daily    [] Every Other Day [] Once per week   [] Twice per week   [] Three times per week [] Do Not Change Dressing   [] Other:     Compression:  Apply: [] Multilayer Compression Wrap: []RightLeg []Left Leg                                 []Profore  []Profore Lite             []Unna     [] Do not get leg(s) with wrap wet. If wraps become too tight call the center or completely remove the wrap. [x] Elevate leg(s) above the level of the heart when sitting. [x] Avoid prolonged standing in one place. Dietary:  [x] Diet as tolerated: [] Calorie Diabetic Diet: [x] No Added Salt:  [] Increase Protein: [] Other:     Activity:  [x] Activity as tolerated:    [] Patient has no activity restrictions       [] Strict Bedrest:   [] Remain off Work:       [] May return to full duty work:                                     [] Return to work with restrictions:               [address] Information: Should you experience any significant changes in your wound(s) or have questions about your wound care, please contact Thais Caldera at [facility] 2687 8:00 am - 4:30.   If you need help with your wound outside of these hours and cannot wait until we are again available, contact your PCP or go to the hospital emergency room. PLEASE NOTE: IF YOU ARE UNABLE TO OBTAIN WOUND SUPPLIES, CONTINUE TO USE THE SUPPLIES YOU HAVE AVAILABLE UNTIL YOU ARE ABLE TO REACH US. IT IS MOST IMPORTANT TO KEEP THE WOUND COVERED AT ALL TIMES.     [x] Dr. Cortes Mendoza   [] Dr. Yaakov Clement  [] Matilde Cuellar [facility]  [] Dr. Sommer Gunderson

## 2021-06-01 NOTE — WOUND CARE
[address] Progress Note and Procedure Note NO Procedure Noy Regan MEDICAL RECORD NUMBER:  [id number] AGE: 76 y.o. RACE WHITE  GENDER: male  : 1955 EPISODE DATE:  2021 Subjective: Today, reports that he has more pain in his right leg than he has in his left leg, but has severe pain in both legs. He denied fever. Chief Complaint Patient presents with  Wound Check  
  bilateral leg wounds HISTORY of PRESENT ILLNESS HPI Noy Regan is a 76 y.o. male who presents today for wound/ulcer evaluation. History of Wound Context: BLE Wound/Ulcer Pain Timing/Severity: severe Quality of pain: aching, burning, stabbing and throbbing Severity:  7 / 10 Modifying Factors: None Associated Signs/Symptoms: edema Ulcer Identification: 
Ulcer Type: venous Contributing Factors: lymphedema Wound: BLE  
     
PAST MEDICAL HISTORY Past Medical History:  
Diagnosis Date  Arthritis  Chronic obstructive pulmonary disease ([address]  Diabetes ([facility] [address]  Gout  Hypertension  Sleep apnea PAST SURGICAL HISTORY Past Surgical History:  
Procedure Laterality Date  HX ORTHOPAEDIC    
 HX VEIN ABLATION ADHESIVE    
 
 
FAMILY HISTORY Family History Problem Relation Age of Onset  Heart Disease Mother  Kidney Disease Mother  Hypertension Mother  Diabetes Mother  Heart Disease Father  Hypertension Father  Diabetes Sister  Diabetes Brother SOCIAL HISTORY Social History Tobacco Use  Smoking status: Never Smoker  Smokeless tobacco: Never Used Substance Use Topics  Alcohol use: Yes  Drug use: Never ALLERGIES Allergies Allergen Reactions  Elavil Unknown (comments)  Sulfa (Sulfonamide Antibiotics) Nausea and Vomiting MEDICATIONS Current Outpatient Medications on File Prior to Encounter Medication Sig Dispense Refill  bumetanide (BUMEX) 2 mg tablet Take 2 mg by mouth two (2) times a day.  multivits,Stress Formula-Zinc tablet Take 1 Tab by mouth daily.  vitamin e (E GEMS) 1,000 unit capsule Take 1,000 Units by mouth two (2) times a day.  multivitamin (ONE A DAY) tablet Take 1 Tab by mouth daily.  ferrous sulfate (Iron) 325 mg (65 mg iron) tablet Take  by mouth Daily (before breakfast).  aspirin 81 mg chewable tablet Take 81 mg by mouth daily.  flunisolide (NASAREL) 25 mcg (0.025 %) spry 2 Sprays two (2) times a day.  albuterol (ProAir HFA) 90 mcg/actuation inhaler Take  by inhalation.  colchicine 0.6 mg tablet Take 0.6 mg by mouth daily.  gabapentin (NEURONTIN) 300 mg capsule Take 300 mg by mouth four (4) times daily.  oxyCODONE IR (ROXICODONE) 5 mg immediate release tablet Take 5 mg by mouth every twelve (12) hours.  metFORMIN (GLUCOPHAGE) 500 mg tablet Take 1,000 mg by mouth two (2) times daily (with meals).  hydroCHLOROthiazide (HYDRODIURIL) 25 mg tablet Take 25 mg by mouth daily.  insulin lispro (HumaLOG U-100 Insulin) 100 unit/mL injection by SubCUTAneous route. Sliding scale  insulin glargine (LANTUS) 100 unit/mL injection by SubCUTAneous route nightly. 80units am/ 20units pm    
 
No current facility-administered medications on file prior to encounter. REVIEW OF SYSTEMS Pertinent items are noted in HPI. Objective:  
 
Visit Vitals /77 (BP 1 Location: Right upper arm, BP Patient Position: At rest;Sitting) Pulse 89 Temp 97.8 °F (36.6 °C) Resp 20 Wt Readings from Last 3 Encounters:  
03/29/21 136.1 kg (300 lb)  
11/23/20 (!) 166.5 kg (367 lb) PHYSICAL EXAM 
Right and left leg ulcers measure larger, there is an increase in bioburden, no foul odor noted Pertinent items are noted in HPI. Assessment:  
Worsening bilateral lower extremity ulcers Problem List Items Addressed This Visit  Idiopathic chronic venous hypertension of left lower extremity with ulcer ([facility] [address] Localized edema Procedure Note Indications:  Based on my examination of this patient's wound(s)/ulcer(s) today, debridement is not required to promote healing and evaluate the wound base. Wound Leg lower Left;Lateral #1 (Active) Wound Image   06/01/21 1405 Wound Etiology Venous 06/01/21 1405 Dressing Status Clean;Dry; Intact 06/01/21 1405 Cleansed Soap and water 06/01/21 1405 Dressing/Treatment Faye campos 10/26/20 1002 Offloading for Diabetic Foot Ulcers No 06/01/21 1405 Wound Length (cm) 12 cm 06/01/21 1405 Wound Width (cm) 10.5 cm 06/01/21 1405 Wound Depth (cm) 0.2 cm 06/01/21 1405 Wound Surface Area (cm^2) 126 cm^2 06/01/21 1405 Change in Wound Size % -391.8 06/01/21 1405 Wound Volume (cm^3) 25.2 cm^3 06/01/21 1405 Wound Healing % -392 06/01/21 1405 Post-Procedure Length (cm) 15 cm 05/25/21 1413 Post-Procedure Width (cm) 12 cm 05/25/21 1413 Post-Procedure Depth (cm) 0.2 cm 05/25/21 1413 Post-Procedure Surface Area (cm^2) 180 cm^2 05/25/21 1413 Post-Procedure Volume (cm^3) 36 cm^3 05/25/21 1413 Wound Assessment Slough;Granulation tissue 06/01/21 1405 Drainage Amount Large 06/01/21 1405 Drainage Description Purulent 06/01/21 1405 Wound Odor Moderate 06/01/21 1405 Alissa-Wound/Incision Assessment Maceration; Excoriated 06/01/21 1405 Edges Attached edges 06/01/21 1405 Wound Thickness Description Full thickness 06/01/21 1405 Number of days: 260 Wound Leg lower Right;Lateral #2 (Active) Wound Image   06/01/21 1404 Wound Etiology Venous 06/01/21 1404 Dressing Status Clean;Dry; Intact 06/01/21 1404 Cleansed Soap and water 06/01/21 1404 Offloading for Diabetic Foot Ulcers No offloading required 06/01/21 1404 Wound Length (cm) 10 cm 06/01/21 1404 Wound Width (cm) 7.5 cm 06/01/21 1404 Wound Depth (cm) 0.1 cm 06/01/21 1404 Wound Surface Area (cm^2) 75 cm^2 06/01/21 1404 Change in Wound Size % -108.33 06/01/21 1404  
Wound Volume (cm^3) 7.5 cm^3 06/01/21 1404 Wound Healing % -108 06/01/21 1404 Wound Assessment Slough;Granulation tissue 06/01/21 1404 Drainage Amount Moderate 06/01/21 1404 Drainage Description Serosanguinous 06/01/21 1404 Wound Odor None 06/01/21 1404 Alissa-Wound/Incision Assessment Dry/flaky 06/01/21 1404 Edges Attached edges 06/01/21 1404 Wound Thickness Description Full thickness 06/01/21 1404 Number of days: 7 Plan:  
Despite intensive wound care for several months, this patient has failed to have any improvement I have discussed with his primary physician's office, and the patient will be referred to the emergency room for admission to the hospital on the medical service for multidisciplinary management of his recalcitrant bilateral lower extremity venous ulcers Treatment Note please see attached Discharge Instructions Written patient dismissal instructions given to patient or POA.       
 
Electronically signed by Prashant Morales MD on 6/1/2021 at 2:39 PM

## 2021-06-01 NOTE — Clinical Note
Patient Class[de-identified] OBSERVATION [104]   Type of Bed: Telemetry [19]   Cardiac Monitoring Required?: Yes   Reason for Observation: chf   Admitting Diagnosis: Cellulitis [[id number]]   Admitting Physician: Lulu Covington [[id number]]   Attending Physician: Lulu Covington [[id number]]

## 2021-06-01 NOTE — DISCHARGE INSTRUCTIONS
Discharge Instructions  [address] [place] [address], [address] [place] [address]  Telephone: [phone]     NAME:  Fernanda Henson OF BIRTH:  1955  MEDICAL RECORD NUMBER:  [id number]  DATE: 6/1/2021        Return Appointment:  []? Dressing supply provider:   []? Home Healthcare:    [x]? Return Appointment: after discharged from the hospital  []? Nurse Visit:   Thursday     []? Discharge from [facility]  []? Ordered tests:   [x]? Referrals: Pt. To go to the ER   []? Rx:      Wound Cleansing:   Do not scrub or use excessive force. Cleanse wound prior to applying a clean dressing with:  [x]? Normal Saline          [x]? Mild Soap & Water    []? Keep Wound Dry in Shower  []? Other:       Topical Treatments:  Do not apply lotions, creams, or ointments to wound bed unless directed. []? Apply moisturizing lotion to skin surrounding the wound prior to dressing change. []? Apply antifungal ointment to skin surrounding the wound prior to dressing change. []? Apply thin film of moisture barrier ointment to skin immediately around wound. []? Other:  Betadine to israel-wound                Dressings:                  Wound Location Left leg       [x]? Apply Primary Dressing:                                          []? MediHoney Gel         []? MediHoney Alginate                     []? Calcium Alginate      []? Calcium Alginate with Silver              []? Collagen                   []? Collagen with Silver                []? Santyl with Moistened saline gauze              []? Hydrofera Blue (cut to size and moistened)  []? Hydrofera Blue Ready (Cut to size)              [x]? NS wet to dry            []? Betadine wet to dry              []? Hydrogel                   []? Mepitel                   []? Bactroban/Mupirocin                       []? Other: Double Drawtex     [x]? Cover and Secure with:                   [x]? Gauze        []? Ree Gui           []? Kerlix              []? Foam          []? Super Absorbant    []? ABD                                      [x]? ACE Wrap            []? Other:               Avoid contact of tape with skin.     [x]? Change dressing:  []? Daily           []? Every Other Day    []? Once per week   []? Twice per week              []? Three times per week        []? Do Not Change Dressing    []? Other:     Dressings:                  Wound Location Right leg    [x]? Apply Primary Dressing:                                          []? MediHoney Gel         []? MediHoney Alginate                     []? Calcium Alginate      []? Calcium Alginate with Silver              []? Collagen                   []? Collagen with Silver                []? Santyl with Moistened saline gauze              []? Hydrofera Blue (cut to size and moistened)  []? Hydrofera Blue Ready (Cut to size)              [x]? NS wet to dry            []? Betadine wet to dry              []? Hydrogel                   []? Mepitel                   []? Bactroban/Mupirocin                       []? Other: Drawtex     [x]? Cover and Secure with:                   [x]? Gauze        []? Harrie Rounds           []? Kerlix              []? Foam          []? Super Absorbant    []? ABD                                      [x]? ACE Wrap            []? Other:               Avoid contact of tape with skin.     [x]? Change dressing:  []? Daily           []? Every Other Day    []? Once per week   []? Twice per week              []? Three times per week        []? Do Not Change Dressing    []? Other:                Compression:  Apply:  []? Multilayer Compression Wrap:      []? RightLeg      []? Left Leg                                 []?Profore            []? Profore Lite             []?Unna                 []? Do not get leg(s) with wrap wet. If wraps become too tight call the center or completely remove the wrap. [x]?  Elevate leg(s) above the level of the heart when sitting. [x]? Avoid prolonged standing in one place.     Dietary:  [x]? Diet as tolerated:   []? Calorie Diabetic Diet:         [x]? No Added Salt:  []? Increase Protein:   []? Other:              Activity:  [x]? Activity as tolerated:    []? Patient has no activity restrictions       []? Strict Bedrest:          []? Remain off Work:       []? May return to full duty work:                                               []? Return to work with restrictions:      [facility]: Should you experience any significant changes in your wound(s) or have questions about your wound care, please contact Thais Caldera at [place] [address] 8:00 am - 4:30. If you need help with your wound outside of these hours and cannot wait until we are again available, contact your PCP or go to the hospital emergency room.      PLEASE NOTE: IF YOU ARE UNABLE TO [address], CONTINUE TO USE THE SUPPLIES YOU HAVE AVAILABLE UNTIL YOU ARE ABLE TO [facility]. IT IS MOST IMPORTANT TO KEEP THE WOUND COVERED AT ALL TIMES.     [x]? Dr. Anita Bonilla   []? Dr. Alexander Ortiz  []? [facility]  []?  Dr. Olman Staples

## 2021-06-01 NOTE — ED TRIAGE NOTES
Pt went to wound care and told he need to come to be admitted. Amanda is aware and expecting call. Infection to legs.

## 2021-06-02 PROBLEM — L02.415 CELLULITIS AND ABSCESS OF RIGHT LEG: Status: ACTIVE | Noted: 2021-06-02

## 2021-06-02 PROBLEM — L03.115 CELLULITIS AND ABSCESS OF RIGHT LEG: Status: ACTIVE | Noted: 2021-06-02

## 2021-06-02 LAB
ANION GAP SERPL CALC-SCNC: 7 MMOL/L (ref 5–15)
ATRIAL RATE: 104 BPM
BUN SERPL-MCNC: 30 MG/DL (ref 6–20)
BUN/CREAT SERPL: 21 (ref 12–20)
CA-I BLD-MCNC: 8.9 MG/DL (ref 8.5–10.1)
CALCULATED P AXIS, ECG09: 93 DEGREES
CALCULATED R AXIS, ECG10: 180 DEGREES
CALCULATED T AXIS, ECG11: 89 DEGREES
CHLORIDE SERPL-SCNC: 106 MMOL/L (ref 97–108)
CO2 SERPL-SCNC: 25 MMOL/L (ref 21–32)
CREAT SERPL-MCNC: 1.45 MG/DL (ref 0.7–1.3)
DIAGNOSIS, 93000: NORMAL
EST. AVERAGE GLUCOSE BLD GHB EST-MCNC: 126 MG/DL
GLUCOSE BLD STRIP.AUTO-MCNC: 140 MG/DL (ref 65–117)
GLUCOSE BLD STRIP.AUTO-MCNC: 152 MG/DL (ref 65–117)
GLUCOSE BLD STRIP.AUTO-MCNC: 160 MG/DL (ref 65–117)
GLUCOSE BLD STRIP.AUTO-MCNC: 186 MG/DL (ref 65–117)
GLUCOSE SERPL-MCNC: 132 MG/DL (ref 65–100)
HBA1C MFR BLD: 6 % (ref 4–5.6)
P-R INTERVAL, ECG05: 116 MS
PERFORMED BY, TECHID: ABNORMAL
POTASSIUM SERPL-SCNC: 3.9 MMOL/L (ref 3.5–5.1)
Q-T INTERVAL, ECG07: 300 MS
QRS DURATION, ECG06: 72 MS
QTC CALCULATION (BEZET), ECG08: 394 MS
SODIUM SERPL-SCNC: 138 MMOL/L (ref 136–145)
VENTRICULAR RATE, ECG03: 104 BPM

## 2021-06-02 PROCEDURE — 74011250636 HC RX REV CODE- 250/636: Performed by: EMERGENCY MEDICINE

## 2021-06-02 PROCEDURE — 82962 GLUCOSE BLOOD TEST: CPT

## 2021-06-02 PROCEDURE — 94640 AIRWAY INHALATION TREATMENT: CPT

## 2021-06-02 PROCEDURE — 80048 BASIC METABOLIC PNL TOTAL CA: CPT

## 2021-06-02 PROCEDURE — 36415 COLL VENOUS BLD VENIPUNCTURE: CPT

## 2021-06-02 PROCEDURE — 74011000250 HC RX REV CODE- 250: Performed by: INTERNAL MEDICINE

## 2021-06-02 PROCEDURE — 77010033678 HC OXYGEN DAILY

## 2021-06-02 PROCEDURE — 83036 HEMOGLOBIN GLYCOSYLATED A1C: CPT

## 2021-06-02 PROCEDURE — 87040 BLOOD CULTURE FOR BACTERIA: CPT

## 2021-06-02 PROCEDURE — 74011250637 HC RX REV CODE- 250/637: Performed by: FAMILY MEDICINE

## 2021-06-02 PROCEDURE — 74011000258 HC RX REV CODE- 258: Performed by: FAMILY MEDICINE

## 2021-06-02 PROCEDURE — 74011250637 HC RX REV CODE- 250/637

## 2021-06-02 PROCEDURE — 99218 HC RM OBSERVATION: CPT

## 2021-06-02 PROCEDURE — 74011000258 HC RX REV CODE- 258: Performed by: EMERGENCY MEDICINE

## 2021-06-02 PROCEDURE — 97602 WOUND(S) CARE NON-SELECTIVE: CPT

## 2021-06-02 PROCEDURE — 99222 1ST HOSP IP/OBS MODERATE 55: CPT | Performed by: INTERNAL MEDICINE

## 2021-06-02 PROCEDURE — 74011636637 HC RX REV CODE- 636/637: Performed by: FAMILY MEDICINE

## 2021-06-02 PROCEDURE — 74011250636 HC RX REV CODE- 250/636: Performed by: FAMILY MEDICINE

## 2021-06-02 PROCEDURE — 94760 N-INVAS EAR/PLS OXIMETRY 1: CPT

## 2021-06-02 PROCEDURE — 96375 TX/PRO/DX INJ NEW DRUG ADDON: CPT

## 2021-06-02 PROCEDURE — 65270000029 HC RM PRIVATE

## 2021-06-02 PROCEDURE — 96376 TX/PRO/DX INJ SAME DRUG ADON: CPT

## 2021-06-02 RX ORDER — INSULIN GLARGINE 100 [IU]/ML
20 INJECTION, SOLUTION SUBCUTANEOUS
Status: DISCONTINUED | OUTPATIENT
Start: 2021-06-02 | End: 2021-06-03

## 2021-06-02 RX ORDER — METFORMIN HYDROCHLORIDE 500 MG/1
1000 TABLET ORAL 2 TIMES DAILY WITH MEALS
Status: DISCONTINUED | OUTPATIENT
Start: 2021-06-02 | End: 2021-06-05

## 2021-06-02 RX ORDER — GABAPENTIN 300 MG/1
300 CAPSULE ORAL 3 TIMES DAILY
Status: DISCONTINUED | OUTPATIENT
Start: 2021-06-02 | End: 2021-06-02

## 2021-06-02 RX ORDER — OXYCODONE HYDROCHLORIDE 5 MG/1
5 TABLET ORAL EVERY 12 HOURS
Status: DISCONTINUED | OUTPATIENT
Start: 2021-06-02 | End: 2021-06-02

## 2021-06-02 RX ORDER — ACETAMINOPHEN 325 MG/1
650 TABLET ORAL
Status: DISCONTINUED | OUTPATIENT
Start: 2021-06-02 | End: 2021-06-10 | Stop reason: HOSPADM

## 2021-06-02 RX ORDER — DEXTROSE 50 % IN WATER (D50W) INTRAVENOUS SYRINGE
25-50 AS NEEDED
Status: DISCONTINUED | OUTPATIENT
Start: 2021-06-02 | End: 2021-06-10 | Stop reason: HOSPADM

## 2021-06-02 RX ORDER — MORPHINE SULFATE 2 MG/ML
2 INJECTION, SOLUTION INTRAMUSCULAR; INTRAVENOUS ONCE
Status: COMPLETED | OUTPATIENT
Start: 2021-06-02 | End: 2021-06-02

## 2021-06-02 RX ORDER — OXYCODONE HYDROCHLORIDE 5 MG/1
5 TABLET ORAL
Status: DISCONTINUED | OUTPATIENT
Start: 2021-06-02 | End: 2021-06-02 | Stop reason: SDUPTHER

## 2021-06-02 RX ORDER — COLCHICINE 0.6 MG/1
0.6 TABLET ORAL DAILY
Status: DISCONTINUED | OUTPATIENT
Start: 2021-06-03 | End: 2021-06-02

## 2021-06-02 RX ORDER — TAMSULOSIN HYDROCHLORIDE 0.4 MG/1
0.4 CAPSULE ORAL DAILY
Status: DISCONTINUED | OUTPATIENT
Start: 2021-06-03 | End: 2021-06-10 | Stop reason: HOSPADM

## 2021-06-02 RX ORDER — TAMSULOSIN HYDROCHLORIDE 0.4 MG/1
0.8 CAPSULE ORAL DAILY
COMMUNITY

## 2021-06-02 RX ORDER — SODIUM CHLORIDE 9 MG/ML
75 INJECTION, SOLUTION INTRAVENOUS CONTINUOUS
Status: DISCONTINUED | OUTPATIENT
Start: 2021-06-02 | End: 2021-06-02

## 2021-06-02 RX ORDER — BISMUTH SUBSALICYLATE 262 MG
1 TABLET,CHEWABLE ORAL DAILY
Status: DISCONTINUED | OUTPATIENT
Start: 2021-06-03 | End: 2021-06-10 | Stop reason: HOSPADM

## 2021-06-02 RX ORDER — ASCORBIC ACID 500 MG
1000 TABLET ORAL 2 TIMES DAILY
Status: DISCONTINUED | OUTPATIENT
Start: 2021-06-02 | End: 2021-06-10 | Stop reason: HOSPADM

## 2021-06-02 RX ORDER — ONDANSETRON 2 MG/ML
4 INJECTION INTRAMUSCULAR; INTRAVENOUS
Status: DISCONTINUED | OUTPATIENT
Start: 2021-06-02 | End: 2021-06-10 | Stop reason: HOSPADM

## 2021-06-02 RX ORDER — OXYCODONE HYDROCHLORIDE 10 MG/1
10 TABLET ORAL
Status: DISCONTINUED | OUTPATIENT
Start: 2021-06-02 | End: 2021-06-10 | Stop reason: HOSPADM

## 2021-06-02 RX ORDER — ACETAMINOPHEN 325 MG/1
650 TABLET ORAL
Status: DISCONTINUED | OUTPATIENT
Start: 2021-06-02 | End: 2021-06-02 | Stop reason: SDUPTHER

## 2021-06-02 RX ORDER — MENTHOL
1000 GEL (GRAM) TOPICAL 2 TIMES DAILY
Status: DISCONTINUED | OUTPATIENT
Start: 2021-06-02 | End: 2021-06-02

## 2021-06-02 RX ORDER — HYDROCHLOROTHIAZIDE 25 MG/1
25 TABLET ORAL DAILY
Status: DISCONTINUED | OUTPATIENT
Start: 2021-06-03 | End: 2021-06-02

## 2021-06-02 RX ORDER — GABAPENTIN 300 MG/1
300 CAPSULE ORAL 4 TIMES DAILY
Status: DISCONTINUED | OUTPATIENT
Start: 2021-06-02 | End: 2021-06-10 | Stop reason: HOSPADM

## 2021-06-02 RX ORDER — GUAIFENESIN 100 MG/5ML
81 LIQUID (ML) ORAL DAILY
Status: DISCONTINUED | OUTPATIENT
Start: 2021-06-03 | End: 2021-06-10 | Stop reason: HOSPADM

## 2021-06-02 RX ORDER — ALBUTEROL SULFATE 90 UG/1
2 AEROSOL, METERED RESPIRATORY (INHALATION)
Status: DISCONTINUED | OUTPATIENT
Start: 2021-06-02 | End: 2021-06-10 | Stop reason: HOSPADM

## 2021-06-02 RX ORDER — BUMETANIDE 0.25 MG/ML
1 INJECTION INTRAMUSCULAR; INTRAVENOUS 2 TIMES DAILY
Status: DISCONTINUED | OUTPATIENT
Start: 2021-06-02 | End: 2021-06-10 | Stop reason: HOSPADM

## 2021-06-02 RX ORDER — INSULIN LISPRO 100 [IU]/ML
INJECTION, SOLUTION INTRAVENOUS; SUBCUTANEOUS
Status: DISCONTINUED | OUTPATIENT
Start: 2021-06-02 | End: 2021-06-02

## 2021-06-02 RX ORDER — POLYETHYLENE GLYCOL 3350 17 G/17G
17 POWDER, FOR SOLUTION ORAL DAILY PRN
Status: DISCONTINUED | OUTPATIENT
Start: 2021-06-02 | End: 2021-06-10 | Stop reason: HOSPADM

## 2021-06-02 RX ORDER — VITAMIN E CAP 100 UNIT 100 UNIT
800 CAP ORAL 2 TIMES DAILY
Status: DISCONTINUED | OUTPATIENT
Start: 2021-06-02 | End: 2021-06-05

## 2021-06-02 RX ORDER — FLUTICASONE PROPIONATE 50 MCG
2 SPRAY, SUSPENSION (ML) NASAL DAILY
Status: DISCONTINUED | OUTPATIENT
Start: 2021-06-04 | End: 2021-06-10 | Stop reason: HOSPADM

## 2021-06-02 RX ORDER — INSULIN LISPRO 100 [IU]/ML
INJECTION, SOLUTION INTRAVENOUS; SUBCUTANEOUS
Status: DISCONTINUED | OUTPATIENT
Start: 2021-06-02 | End: 2021-06-10 | Stop reason: HOSPADM

## 2021-06-02 RX ORDER — MAGNESIUM SULFATE 100 %
4 CRYSTALS MISCELLANEOUS AS NEEDED
Status: DISCONTINUED | OUTPATIENT
Start: 2021-06-02 | End: 2021-06-10 | Stop reason: HOSPADM

## 2021-06-02 RX ORDER — ACETAMINOPHEN 650 MG/1
650 SUPPOSITORY RECTAL
Status: DISCONTINUED | OUTPATIENT
Start: 2021-06-02 | End: 2021-06-10 | Stop reason: HOSPADM

## 2021-06-02 RX ORDER — ASCORBIC ACID 500 MG
1000 TABLET ORAL 2 TIMES DAILY
COMMUNITY

## 2021-06-02 RX ORDER — DEXTROSE 50 % IN WATER (D50W) INTRAVENOUS SYRINGE
25-50 AS NEEDED
Status: DISCONTINUED | OUTPATIENT
Start: 2021-06-02 | End: 2021-06-02 | Stop reason: SDUPTHER

## 2021-06-02 RX ORDER — BUMETANIDE 1 MG/1
2 TABLET ORAL 2 TIMES DAILY
Status: DISCONTINUED | OUTPATIENT
Start: 2021-06-02 | End: 2021-06-02

## 2021-06-02 RX ORDER — LANOLIN ALCOHOL/MO/W.PET/CERES
1 CREAM (GRAM) TOPICAL
Status: DISCONTINUED | OUTPATIENT
Start: 2021-06-03 | End: 2021-06-10 | Stop reason: HOSPADM

## 2021-06-02 RX ORDER — MAGNESIUM SULFATE 100 %
4 CRYSTALS MISCELLANEOUS AS NEEDED
Status: DISCONTINUED | OUTPATIENT
Start: 2021-06-02 | End: 2021-06-02 | Stop reason: SDUPTHER

## 2021-06-02 RX ORDER — PROMETHAZINE HYDROCHLORIDE 25 MG/1
12.5 TABLET ORAL
Status: DISCONTINUED | OUTPATIENT
Start: 2021-06-02 | End: 2021-06-10 | Stop reason: HOSPADM

## 2021-06-02 RX ADMIN — GABAPENTIN 300 MG: 300 CAPSULE ORAL at 13:14

## 2021-06-02 RX ADMIN — OXYCODONE HYDROCHLORIDE 10 MG: 10 TABLET ORAL at 21:40

## 2021-06-02 RX ADMIN — GABAPENTIN 300 MG: 300 CAPSULE ORAL at 21:54

## 2021-06-02 RX ADMIN — METFORMIN HYDROCHLORIDE 1000 MG: 500 TABLET ORAL at 18:30

## 2021-06-02 RX ADMIN — INSULIN LISPRO 3 UNITS: 100 INJECTION, SOLUTION INTRAVENOUS; SUBCUTANEOUS at 18:31

## 2021-06-02 RX ADMIN — MORPHINE SULFATE 2 MG: 2 INJECTION, SOLUTION INTRAMUSCULAR; INTRAVENOUS at 11:13

## 2021-06-02 RX ADMIN — INSULIN LISPRO 3 UNITS: 100 INJECTION, SOLUTION INTRAVENOUS; SUBCUTANEOUS at 13:14

## 2021-06-02 RX ADMIN — PIPERACILLIN AND TAZOBACTAM 3.38 G: 3; .375 INJECTION, POWDER, FOR SOLUTION INTRAVENOUS at 00:37

## 2021-06-02 RX ADMIN — OXYCODONE HYDROCHLORIDE AND ACETAMINOPHEN 1000 MG: 500 TABLET ORAL at 13:14

## 2021-06-02 RX ADMIN — OXYCODONE HYDROCHLORIDE 5 MG: 5 TABLET ORAL at 13:13

## 2021-06-02 RX ADMIN — BUMETANIDE 2 MG: 1 TABLET ORAL at 13:00

## 2021-06-02 RX ADMIN — INSULIN GLARGINE 20 UNITS: 100 INJECTION, SOLUTION SUBCUTANEOUS at 21:54

## 2021-06-02 RX ADMIN — PIPERACILLIN AND TAZOBACTAM 3.38 G: 3; .375 INJECTION, POWDER, LYOPHILIZED, FOR SOLUTION INTRAVENOUS at 18:30

## 2021-06-02 RX ADMIN — OXYCODONE HYDROCHLORIDE 5 MG: 5 TABLET ORAL at 04:11

## 2021-06-02 RX ADMIN — PIPERACILLIN AND TAZOBACTAM 3.38 G: 3; .375 INJECTION, POWDER, LYOPHILIZED, FOR SOLUTION INTRAVENOUS at 06:11

## 2021-06-02 RX ADMIN — VANCOMYCIN HYDROCHLORIDE 1500 MG: 10 INJECTION, POWDER, LYOPHILIZED, FOR SOLUTION INTRAVENOUS at 14:00

## 2021-06-02 RX ADMIN — VITAMIN E CAP 100 UNIT 800 UNITS: 100 CAP at 21:55

## 2021-06-02 RX ADMIN — BUMETANIDE 1 MG: 0.25 INJECTION INTRAMUSCULAR; INTRAVENOUS at 21:53

## 2021-06-02 RX ADMIN — GABAPENTIN 300 MG: 300 CAPSULE ORAL at 18:30

## 2021-06-02 NOTE — PROGRESS NOTES
Bedside shift change report given to Ansley Kearns LPN (oncoming nurse) by Johny Shah RN (offgoing nurse). Report included the following information SBAR, Kardex, Intake/Output, MAR, Accordion and Recent Results.

## 2021-06-02 NOTE — ED PROVIDER NOTES
EMERGENCY DEPARTMENT HISTORY AND PHYSICAL EXAM        Date: 6/1/2021  Patient Name: Noy Regan    History of Presenting Illness     Chief Complaint   Patient presents with    Shortness of Breath       History Provided By: Patient    HPI: Noy Regan, 76 y.o. male with history of COPD, diabetes, gout, hypertension, and cellulitis who presents with bilateral leg pain and redness. States that symptoms have been present for about a month and worsening. He has been going to wound care. States has not been improving. Last time he was on antibiotics was about a month ago or so. He has had shortness of breath as well that is worse with exertion. Denies any chest pain. Pain in his leg is severe, constant, achy, and worse with movement and ambulation. Denies unilateral leg swelling or history of DVT. PCP: Elba Alexander MD    Current Facility-Administered Medications   Medication Dose Route Frequency Provider Last Rate Last Admin    HYDROmorphone (DILAUDID) injection 1 mg  1 mg IntraVENous ONCE Jeramy Galvan, DO        vancomycin (VANCOCIN) 1,000 mg in 0.9% sodium chloride 250 mL (VIAL-MATE)  1,000 mg IntraVENous ONCE Jeramy Galvan DO        piperacillin-tazobactam (ZOSYN) 3.375 g in 0.9% sodium chloride (MBP/ADV) 100 mL MBP  3.375 g IntraVENous NOW Giancarlodebbie Shepard, DO         Current Outpatient Medications   Medication Sig Dispense Refill    bumetanide (BUMEX) 2 mg tablet Take 2 mg by mouth two (2) times a day.  multivits,Stress Formula-Zinc tablet Take 1 Tab by mouth daily.  vitamin e (E GEMS) 1,000 unit capsule Take 1,000 Units by mouth two (2) times a day.  multivitamin (ONE A DAY) tablet Take 1 Tab by mouth daily.  ferrous sulfate (Iron) 325 mg (65 mg iron) tablet Take  by mouth Daily (before breakfast).  aspirin 81 mg chewable tablet Take 81 mg by mouth daily.  flunisolide (NASAREL) 25 mcg (0.025 %) spry 2 Sprays two (2) times a day.       albuterol (ProAir HFA) 90 mcg/actuation inhaler Take  by inhalation.  colchicine 0.6 mg tablet Take 0.6 mg by mouth daily.  gabapentin (NEURONTIN) 300 mg capsule Take 300 mg by mouth four (4) times daily.  oxyCODONE IR (ROXICODONE) 5 mg immediate release tablet Take 5 mg by mouth every twelve (12) hours.  metFORMIN (GLUCOPHAGE) 500 mg tablet Take 1,000 mg by mouth two (2) times daily (with meals).  hydroCHLOROthiazide (HYDRODIURIL) 25 mg tablet Take 25 mg by mouth daily.  insulin lispro (HumaLOG U-100 Insulin) 100 unit/mL injection by SubCUTAneous route. Sliding scale      insulin glargine (LANTUS) 100 unit/mL injection by SubCUTAneous route nightly. 80units am/ 20units pm       Facility-Administered Medications Ordered in Other Encounters   Medication Dose Route Frequency Provider Last Rate Last Admin    lidocaine (XYLOCAINE) 2 % viscous solution 15 mL  15 mL Topical PRN Marlena Stone MD        lidocaine (XYLOCAINE) 2 % viscous solution 15 mL  15 mL Topical PRN Marlena Stone MD           Past History     Past Medical History:  Past Medical History:   Diagnosis Date    Arthritis     [facility] [address]     Diabetes ([facility] [address]     Gout     Hypertension     Sleep apnea        Past Surgical History:  Past Surgical History:   Procedure Laterality Date    HX ORTHOPAEDIC      HX VEIN ABLATION ADHESIVE         Family History:  Family History   Problem Relation Age of Onset    Heart Disease Mother     Kidney Disease Mother     Hypertension Mother     Diabetes Mother     Heart Disease Father     Hypertension Father     Diabetes Sister     Diabetes Brother        Social History:  Social History     Tobacco Use    Smoking status: Never Smoker    Smokeless tobacco: Never Used   Substance Use Topics    Alcohol use: Yes    Drug use: Never       Allergies:   Allergies   Allergen Reactions    Elavil Unknown (comments)    Sulfa (Sulfonamide Antibiotics) Nausea and Vomiting         Review of Systems   Review of Systems   Constitutional: Negative for fever. HENT: Negative for congestion. Eyes: Negative for visual disturbance. Respiratory: Positive for shortness of breath. Cardiovascular: Positive for leg swelling. Negative for chest pain. Gastrointestinal: Negative for abdominal pain. Genitourinary: Negative for dysuria. Musculoskeletal: Negative for arthralgias. Skin: Positive for rash. Neurological: Negative for headaches. Physical Exam   Constitutional: No acute distress. Well-nourished. Skin: No other rash other than as described in MSK below. ENT: No rhinorrhea. No cough. Head is normocephalic and atraumatic. Eye: No proptosis or conjunctival injections. Respiratory: No apparent respiratory distress. Gastrointestinal: Nondistended. Musculoskeletal: No obvious bony deformities. Patient has swelling to the bilateral lower extremities. He has bilateral skin ulcerations with chronic wounds. There is surrounding erythema and tenderness. There is induration. Psychiatric: Cooperative. Appropriate mood and affect. Diagnostic Study Results     Labs -     Recent Results (from the past 24 hour(s))   CBC WITH AUTOMATED DIFF    Collection Time: 06/01/21  8:20 PM   Result Value Ref Range    WBC 10.3 4.1 - 11.1 K/uL    RBC 4.41 4.10 - 5.70 M/uL    HGB 12.3 12.1 - 17.0 g/dL    HCT 39.9 36.6 - 50.3 %    MCV 90.5 80.0 - 99.0 FL    MCH 27.9 26.0 - 34.0 PG    MCHC 30.8 30.0 - 36.5 g/dL    RDW 15.0 (H) 11.5 - 14.5 %    PLATELET 298 743 - 457 K/uL    MPV 10.7 8.9 - 12.9 FL    NRBC 0.0 0.0  WBC    ABSOLUTE NRBC 0.00 0.00 - 0.01 K/uL    NEUTROPHILS 75 32 - 75 %    LYMPHOCYTES 15 12 - 49 %    MONOCYTES 9 5 - 13 %    EOSINOPHILS 1 0 - 7 %    BASOPHILS 0 0 - 1 %    IMMATURE GRANULOCYTES 0 0 - 0.5 %    ABS. NEUTROPHILS 7.7 1.8 - 8.0 K/UL    ABS. LYMPHOCYTES 1.5 0.8 - 3.5 K/UL    ABS. MONOCYTES 0.9 0.0 - 1.0 K/UL    ABS.  EOSINOPHILS 0.1 0.0 - 0.4 K/UL ABS. BASOPHILS 0.0 0.0 - 0.1 K/UL    ABS. IMM. GRANS. 0.0 0.00 - 0.04 K/UL    DF AUTOMATED     METABOLIC PANEL, BASIC    Collection Time: 06/01/21  8:20 PM   Result Value Ref Range    Sodium 138 136 - 145 mmol/L    Potassium 3.8 3.5 - 5.1 mmol/L    Chloride 101 97 - 108 mmol/L    CO2 27 21 - 32 mmol/L    Anion gap 10 5 - 15 mmol/L    Glucose 169 (H) 65 - 100 mg/dL    BUN 34 (H) 6 - 20 mg/dL    Creatinine 1.70 (H) 0.70 - 1.30 mg/dL    BUN/Creatinine ratio 20 12 - 20      GFR est AA 49 (L) >60 ml/min/1.73m2    GFR est non-AA 41 (L) >60 ml/min/1.73m2    Calcium 9.4 8.5 - 10.1 mg/dL   TROPONIN I    Collection Time: 06/01/21  8:20 PM   Result Value Ref Range    Troponin-I, Qt. <0.05 <0.05 ng/mL   BNP    Collection Time: 06/01/21  8:20 PM   Result Value Ref Range    NT pro-BNP 1,545 (H) <125 pg/mL   LACTIC ACID    Collection Time: 06/01/21  8:20 PM   Result Value Ref Range    Lactic acid 2.8 (HH) 0.4 - 2.0 mmol/L       Radiologic Studies -   XR CHEST PORT   Final Result   Stable mild-to-moderate enlargement of cardiomediastinal silhouette. Mediastinum underpenetrated with suboptimal visualization of right atrial and   right ventricular pacing leads. There is obscuration of a portion of the left   hemithorax by the generator device. No pneumothorax. No evidence of pulmonary   edema, air space pneumonia, or pleural effusion. CT Results  (Last 48 hours)    None        CXR Results  (Last 48 hours)               06/01/21 2003  XR CHEST PORT Final result    Impression:  Stable mild-to-moderate enlargement of cardiomediastinal silhouette. Mediastinum underpenetrated with suboptimal visualization of right atrial and   right ventricular pacing leads. There is obscuration of a portion of the left   hemithorax by the generator device. No pneumothorax. No evidence of pulmonary   edema, air space pneumonia, or pleural effusion. Narrative:  Portable chest, 1949 hours. Comparison with 6/22/2020. Medical Decision Making and ED Course     I reviewed the available vital signs, nursing notes, past medical history, past surgical history, family history, and social history. Vital Signs - Reviewed the patient's vital signs. Patient Vitals for the past 12 hrs:   Temp Pulse Resp BP SpO2   06/01/21 1855 99.1 °F (37.3 °C) (!) 101 16 138/76 100 %       EKG interpretation: Attained on 6/1/2021 at 1856. Read at 1900. Left bundle branch block. No ST segment elevations other than would be expected with left bundle branch block. Regular rhythm. P waves difficult to distinguish however appear normal CA interval.  QRS duration 72 ms. QTc 394 ms. Medical Decision Making:   Presented with Bilateral leg swelling and redness as well as dyspnea with exertion . The differential diagnosis is CHF, ACS, cellulitis, lymphedema, chronic wounds, diabetic wounds. Work-up relatively unremarkable except for mildly elevated BNP. Patient does have leg edema with cellulitis. Patient given Vanco and Zosyn. Will admit to Dr. Aide Mata for further care due to worsening pain and high risk patient. Disposition     Admitted to Dr. Aide Mata      Diagnosis     Clinical impression:   1. Cellulitis, unspecified cellulitis site    2. Congestive heart failure, unspecified HF chronicity, unspecified heart failure type [facility])           Attestation:  Please note that this dictation was completed with [employer], the computer voice recognition software. Quite often unanticipated grammatical, syntax, homophones, and other interpretive errors are inadvertently transcribed by the computer software. Please disregard these errors. Please excuse any errors that have escaped final proofreading. Thank you.   Ayesha Jones, DO

## 2021-06-02 NOTE — PROGRESS NOTES
Primary Nurse Maryam Blue RN and Kofi Rodriguez RN performed a dual skin assessment on this patient Impairment noted- see wound doc flow sheet  Kumar score is 20. Aside from documented wounds patient has numerous skin tags in his axilla bilaterally, and his groin bilaterally. Skin is very dry and crusty on scattered areas of torso. Feet very dry with very thick cracked skin on the bottom.

## 2021-06-02 NOTE — CONSULTS
Consult    Patient: Juan Luis Camejo MRN: [id number]  SSN: [id number]    YOB: 1955  Age: 76 y.o. Sex: male      Subjective:      Juan Luis Camejo is a 76 y.o. male who is being seen for congestive heart failure. Mr. Alvia Sandhoff is a 79-year-old white male with history of diabetes mellitus, morbid obesity, COPD, venous insufficiency, status post vein ablation bilaterally for recurrent cellulitis, obstructive sleep apnea on CPAP machine, who was referred here for palpitation. Patient is on disability for chronic back pain after he sustained a fall. He has not very active because of chronic back pain by as well, shortness of breath. He complained of palpitation without syncope. His ulcers did improve after venous ablation. His father  at age of 68 of heart attack. He is a never smoker. He used to work as an . He denied illicit drug. Ejection fraction was 40-45%. 3 times a day was 0.74 which is normal.  EF visually is higher. Fixed defect in the inferior segments suggestive of infarction without ischemia versus artifact. Echo in 2019 showed EF of 55-60% with mild left atrial enlargement, mild LVH. Later admitted and noted to have Mobitz 2 AV block and severe sinus bradycardia with heart rate in the 40s. He subsequently underwent Medtronic dual-chamber pacemaker. He underwent right and left cardiac catheterization recently. right atrial pressure at 12 mmHg. RV pressure was 43/7 with EDP of 60 mmHg. PA pressure was 38/14 with a mean of 26 mmHg. Pulmonary capillary wedge pressure was 20/19 with a mean of 16 mmHg. LV pressure 130/5 with an EDP of 19 mmHg. Cardiac output was 7.9 L/min with a cardiac index of 2.91. Coronary arteries were normal.  Right after he was discharged she was readmitted with cellulitis of the left leg. He was treated for acute gouty arthritis. Patient is admitted with lower extremity swelling, cellulitis.   He was supposed to follow-up with Dr. Painter Forward for further evaluation of the venous system but he did not make a follow-up. He has been complaining of some shortness of breath and some none exertional chest pain. Denied any nausea, vomiting or excessive sweating.   Past Medical History:   Diagnosis Date    Arthritis     Chronic obstructive pulmonary disease ([facility] [address]     Diabetes ([facility] [address]     Gout     Hypertension     Ill-defined condition     Sleep apnea      Past Surgical History:   Procedure Laterality Date    HX ORTHOPAEDIC      HX VEIN ABLATION ADHESIVE        Family History   Problem Relation Age of Onset    Heart Disease Mother     Kidney Disease Mother     Hypertension Mother     Diabetes Mother     Heart Disease Father     Hypertension Father     Diabetes Sister     Diabetes Brother      Social History     Tobacco Use    Smoking status: Never Smoker    Smokeless tobacco: Never Used   Substance Use Topics    Alcohol use: Yes      Current Facility-Administered Medications   Medication Dose Route Frequency Provider Last Rate Last Admin    piperacillin-tazobactam (ZOSYN) 3.375 g in 0.9% sodium chloride (MBP/ADV) 100 mL MBP  3.375 g IntraVENous Q8H Moni Patel MD 25 mL/hr at 06/02/21 0611 3.375 g at 06/02/21 6161    glucose chewable tablet 16 g  4 Tablet Oral PRN Gabe Patel MD        dextrose (D50W) injection syrg 12.5-25 g  25-50 mL IntraVENous PRN Gabe Ptael MD        glucagon (GLUCAGEN) injection 1 mg  1 mg IntraMUSCular PRN Gabe Patel MD        oxyCODONE IR (ROXICODONE) tablet 5 mg  5 mg Oral Q12H PRN Moni Patel MD   5 mg at 06/02/21 1313    vancomycin (VANCOCIN) 1,500 mg in 0.9% sodium chloride 500 mL IVPB  1,500 mg IntraVENous Q18H Moni Patel  mL/hr at 06/02/21 1400 1,500 mg at 06/02/21 1400    Vancomycin Pharmacy Dosing   Other Rx Dosing/Monitoring Oumou Carrillo MD        [START ON 6/4/2021] Vancomycin Trough Level scheduled for 6-4-21 at 1600 Other ONCE Kathi Patel MD        albuterol (PROVENTIL HFA, VENTOLIN HFA, PROAIR HFA) inhaler 2 Puff  2 Puff Inhalation Q6H PRN Kathi Patel MD        ascorbic acid (vitamin C) (VITAMIN C) tablet 1,000 mg  1,000 mg Oral BID Moni Patel MD   1,000 mg at 06/02/21 1314    [START ON 6/3/2021] aspirin chewable tablet 81 mg  81 mg Oral DAILY Moni Patel MD        bumetanide Marcheta Mandril) tablet 2 mg  2 mg Oral BID Moni Patel MD   2 mg at 06/02/21 1300    [START ON 6/3/2021] colchicine tablet 0.6 mg  0.6 mg Oral DAILY Kathi Patel MD Sena Macintosh [START ON 6/3/2021] ferrous sulfate tablet 325 mg  1 Tablet Oral ACB Kathi Patel MD Sena Macintosh . PHARMACY TO SUBSTITUTE PER PROTOCOL (Reordered from: flunisolide (NASAREL) 25 mcg (0.025 %) spry)    Per Protocol Jenaro Sanchez MD        gabapentin (NEURONTIN) capsule 300 mg  300 mg Oral QID Moni Patel MD   300 mg at 06/02/21 1314    [START ON 6/3/2021] hydroCHLOROthiazide (HYDRODIURIL) tablet 25 mg  25 mg Oral DAILY Kathi Patel MD        insulin glargine (LANTUS) injection 20 Units  20 Units SubCUTAneous QHS Moni Patel MD        metFORMIN (GLUCOPHAGE) tablet 1,000 mg  1,000 mg Oral BID WITH MEALS Kathi Patel MD Sena Macintosh [START ON 6/3/2021] multivitamin (ONE A DAY) tablet 1 Tablet  1 Tablet Oral DAILY Moni Patel MD Sena Macintosh [START ON 6/3/2021] tamsulosin (FLOMAX) capsule 0.4 mg  0.4 mg Oral DAILY Moni Patel MD        tiotropium bromide (SPIRIVA RESPIMAT) 2.5 mcg /actuation  5 mcg Inhalation DAILY Moni Patel MD        vitamin e (E GEMS) capsule 1,000 Units  1,000 Units Oral BID Kathi Patel MD Sena Macintosh insulin lispro (HUMALOG) injection   SubCUTAneous AC&HS Jenaro Sanchez MD   3 Units at 06/02/21 1314    [Held by provider] 0.9% sodium chloride infusion  75 mL/hr IntraVENous CONTINUOUS Kathi Patel MD        acetaminophen (TYLENOL) tablet 650 mg  650 mg Oral Q6H PRN Jenaro Sanchez MD Or    acetaminophen (TYLENOL) suppository 650 mg  650 mg Rectal Q6H PRN Ken Patel MD        polyethylene glycol (MIRALAX) packet 17 g  17 g Oral DAILY PRN Ken Patel MD        promethazine (PHENERGAN) tablet 12.5 mg  12.5 mg Oral Q6H PRN Ken Patel MD        Or    ondansetron [facility]) injection 4 mg  4 mg IntraVENous Q6H PRN Ken Patel MD        oxyCODONE IR (ROXICODONE) tablet 10 mg  10 mg Oral Q6H PRN Moni Patel MD         Facility-Administered Medications Ordered in Other Encounters   Medication Dose Route Frequency Provider Last Rate Last Admin    lidocaine (XYLOCAINE) 2 % viscous solution 15 mL  15 mL Topical PRN Shea Ribeiro MD        lidocaine (XYLOCAINE) 2 % viscous solution 15 mL  15 mL Topical PRN Shea Ribeiro MD            Allergies   Allergen Reactions    Elavil Unknown (comments)    Sulfa (Sulfonamide Antibiotics) Nausea and Vomiting       Review of Systems:  A comprehensive review of systems was negative except for that written in the History of Present Illness. Objective:     Vitals:    06/02/21 0147 06/02/21 0857 06/02/21 1004 06/02/21 1222   BP: 133/83 124/69 129/76    Pulse: 96 94 99    Resp: 18 11 13    Temp: 98.1 °F (36.7 °C) 98.9 °F (37.2 °C) 98.9 °F (37.2 °C)    SpO2: 99% 97% 99% 96%   Weight:       Height:            Physical Exam:  General:  Alert, cooperative, no distress, appears stated age. Eyes:  Conjunctivae/corneas clear. PERRL, EOMs intact. Fundi benign   Ears:  Normal TMs and external ear canals both ears. Nose: Nares normal. Septum midline. Mucosa normal. No drainage or sinus tenderness. Mouth/Throat: Lips, mucosa, and tongue normal. Teeth and gums normal.   Neck: Supple, symmetrical, trachea midline, no adenopathy, thyroid: no enlargment/tenderness/nodules, no carotid bruit and no JVD. Back:   Symmetric, no curvature. ROM normal. No CVA tenderness. Lungs:   Clear to auscultation bilaterally. Heart:  Regular rate and rhythm, S1, S2 normal, no murmur, click, rub or gallop. Abdomen: Morbidly obese   Extremities:  Bilateral lower limb swelling. Pulses: 2+ and symmetric all extremities. Skin: Skin color, texture, turgor normal. No rashes or lesions   Lymph nodes: Cervical, supraclavicular, and axillary nodes normal.   Neurologic: CNII-XII intact. Normal strength, sensation and reflexes throughout. Assessment:     Hospital Problems  Date Reviewed: 6/1/2021        Codes Class Noted POA    Cellulitis and abscess of right leg ICD-10-CM: L03.115, L02.415  ICD-9-CM: 682.6  6/2/2021 Unknown        Cellulitis ICD-10-CM: L03.90  ICD-9-CM: 682.9  6/1/2021 Unknown            Mr. Modesto Chapin is a 79-year-old white male with:  1. Heart failure with decompensation. 2.  Morbid obesity. 3.  Hypertension with hypertensive heart disease. 4.  Mixed hyperlipidemia  5. Diabetes mellitus with neuropathy. 6.  Venous insufficiency, status post ablation( Dr. Sommer Patel)  7. Obstructive sleep apnea, on CPAP machine. 8.  COPD  9. Gout. 10. SSS s/p DCPM 9/2019  11. Off BB due to easy fatigability. Plan:     Paced rhythm noted. Hemoglobin 12.3, platelet 101. Hemoglobin A1c 6.0. Troponin was negative while BNP is elevated. Creatinine 1.7 and BUN of 34. Chest x-ray showed mild to moderate enlargement of cardiomediastinal silhouette. Patient with bilateral lower extremity cellulitis. We will continue IV diuresis. Close monitor of kidney function. Patient was supposed to follow-up with Dr. Zak Parkinson regarding deep vein evaluation. Currently on Bumex, aspirin. Currently on colchicine that I will go ahead and stop due to abnormal kidney function. We will go ahead and stop hydrochlorothiazide. I will switch Bumex to IV format. Close monitor of kidney function and daily weights with and ins and outs.     We will consider repeating echocardiogram.    Further recommendation depending on clinical progression and echo finding    Thank you  For involving me in Mr. Oswald logan. I will follow.     Signed By: Jenni Barnes MD     June 2, 2021

## 2021-06-02 NOTE — CONSULTS
Infectious Disease Consult Note    Reason for Consult: Infected stasis ulcers   Date of Consultation: June 2, 2021  Date of Admission: 6/1/2021  Referring Physician: Dr Reyes Pulse     HPI: 76-y.o Funmilayo Quintana admitted on 6/01 for mgt of infected venous stasis ulcers involving b/l lower exts for which ID has been consulted. His medical history is significant for COPD, DM, gout arthritis, HTN, chronic venous stasis involving b/l lower exts, recurrent leg ulcers/cellulitis, sleep apnea and obesity. He was seen on f/u by [facility] on 06/01 and referred to the ED for in pt mgt of his leg ulcers. He also reported dyspnea on admission but CXR is neg for focal infiltrates or edema. He is afebrile w mild leukocytosis of 10.3 on routine labs. Blood CCx (06/01) and wound Cx (06/02) are pending. E faecalis and Alcaligenes faecalis were isolated from left leg wound Cx on 04/19. E faecalis, E. Cloacae and Alcaligenes faecalis from repeat Cx on 05/10. Pt is on Vanc and Zosyn, tolerating both. He protects requested pain medication during my assessment.     Review of Systems:   Gen: Negative for chills, fevers, weight loss, weight gain   CV:  Negative for chest pain, dyspnea on exertion, leg edema   Lungs: Negative for shortness of breath, cough, wheezing   Abdomen: Negative for abdominal pain, nausea, vomiting, diarrhea, constipation   Genitourinary: Negative for genital pain or genital discharge     Skin: Negative for rash, sores/open wounds   Musculoskeletal:B/l LE pain and discomfort    Psych: Negative for manic behavior     Past Medical History:  Past Medical History:   Diagnosis Date    Arthritis     Chronic obstructive pulmonary disease ([facility] [address]     Diabetes ([facility] [address]     Gout     Hypertension     Ill-defined condition     Sleep apnea        Past surgical history   Past Surgical History:   Procedure Laterality Date    HX ORTHOPAEDIC      HX VEIN ABLATION ADHESIVE          Social History   Social History     Tobacco Use    Smoking status: Never Smoker    Smokeless tobacco: Never Used   Substance Use Topics    Alcohol use: Yes    Drug use: Never        Family history   Family History   Problem Relation Age of Onset    Heart Disease Mother     Kidney Disease Mother     Hypertension Mother     Diabetes Mother     Heart Disease Father     Hypertension Father     Diabetes Sister     Diabetes Brother         Allergies: Allergies   Allergen Reactions    Elavil Unknown (comments)    Sulfa (Sulfonamide Antibiotics) Nausea and Vomiting       Medications:  Current Facility-Administered Medications on File Prior to Encounter   Medication Dose Route Frequency Provider Last Rate Last Admin    lidocaine (XYLOCAINE) 2 % viscous solution 15 mL  15 mL Topical PRN Marvin Young MD        lidocaine (XYLOCAINE) 2 % viscous solution 15 mL  15 mL Topical PRN Marvin Young MD         Current Outpatient Medications on File Prior to Encounter   Medication Sig Dispense Refill    ascorbic acid, vitamin C, (Vitamin C) 500 mg tablet Take 1,000 mg by mouth two (2) times a day. Indications: inadequate vitamin C      tamsulosin (Flomax) 0.4 mg capsule Take 0.4 mg by mouth daily.  tiotropium (Spiriva with HandiHaler) 18 mcg inhalation capsule Take 1 Capsule by inhalation daily.  bumetanide (BUMEX) 2 mg tablet Take 2 mg by mouth two (2) times a day.  multivits,Stress Formula-Zinc tablet Take 1 Tab by mouth daily.  vitamin e (E GEMS) 1,000 unit capsule Take 1,000 Units by mouth two (2) times a day.  multivitamin (ONE A DAY) tablet Take 1 Tab by mouth daily.  aspirin 81 mg chewable tablet Take 81 mg by mouth daily.  flunisolide (NASAREL) 25 mcg (0.025 %) spry 2 Sprays two (2) times a day.  albuterol (ProAir HFA) 90 mcg/actuation inhaler Take  by inhalation.  colchicine 0.6 mg tablet Take 0.6 mg by mouth daily.  gabapentin (NEURONTIN) 300 mg capsule Take 300 mg by mouth four (4) times daily.  oxyCODONE IR (ROXICODONE) 5 mg immediate release tablet Take 5 mg by mouth every twelve (12) hours.  metFORMIN (GLUCOPHAGE) 500 mg tablet Take 1,000 mg by mouth two (2) times daily (with meals).  insulin lispro (HumaLOG U-100 Insulin) 100 unit/mL injection by SubCUTAneous route. Sliding scale      ferrous sulfate (Iron) 325 mg (65 mg iron) tablet Take  by mouth Daily (before breakfast).  hydroCHLOROthiazide (HYDRODIURIL) 25 mg tablet Take 25 mg by mouth daily.  insulin glargine (LANTUS) 100 unit/mL injection by SubCUTAneous route nightly. 80units am/ 20units pm (Patient not taking: Reported on 6/2/2021)         Physical Exam:    Vitals:   Patient Vitals for the past 24 hrs:   Temp Pulse Resp BP SpO2   06/02/21 1600 -- 90 -- -- --   06/02/21 1549 98.4 °F (36.9 °C) 90 25 114/66 95 %   06/02/21 1222 -- -- -- -- 96 %   06/02/21 1200 -- 90 -- -- --   06/02/21 1004 98.9 °F (37.2 °C) 99 13 129/76 99 %   06/02/21 0857 98.9 °F (37.2 °C) 94 11 124/69 97 %   06/02/21 0800 -- 90 -- -- --   06/02/21 0147 98.1 °F (36.7 °C) 96 18 133/83 99 %   06/02/21 0041 -- 95 18 119/61 98 %   06/01/21 1855 99.1 °F (37.3 °C) (!) 101 16 138/76 100 %   ·   · GEN: NAD, AAO x 4  · HEENT: NCAT, PERRLA  · HEART: S1, S2+, RRR, No murmur   · Lungs: CTA B/l, no wheeze/rhonchi   · Abdomen: soft, ND, NT, +BS   · Genitourinary: no genital discharge, no ryan  · Extremities: b/l leg swelling, and erythema, tenderness on palpation   · Skin: chronic ulcerations involving b/l LEs       Labs:   Recent Labs     06/01/21 2020   WBC 10.3   HGB 12.3   HCT 39.9        Recent Labs     06/01/21 2020   BUN 34*   CREA 1.70*       Microbiology Data:  Wound Cx 05/10: E faecalis, E. Cloacae and Alcaligenes faecalis     Imaging:   CXR 06/01: Stable mild-to-moderate enlargement of cardiomediastinal silhouette. Mediastinum underpenetrated with suboptimal visualization of right atrial and  right ventricular pacing leads.  There is obscuration of a portion of the left  hemithorax by the generator device. No pneumothorax. No evidence of pulmonary  edema, air space pneumonia, or pleural effusion. Assessment / Plan:     1. Infected venous stasis ulcers involving b/l LEs L>R, chronic w superinfection       E faecalis, E. Cloacae and Alcaligenes faecalis isolated from wound Cx on 05/10      Afebrile, moderated leukocytosis on routine labs       Continue on Vanc and Zosyn pending repeat wound Cxs      Routine labs in the morning      2. B/l leg cellulitis: + swelling, warmth and increased pain       Continue antibiotics as above, advised on leg elevation to help w venous return     3. B/l LE pain/discomfort: Continue symptomatic mgt      1 time morphine ordered, restarted on home dose of Gabapentin         4. COPD w suspected exacerbation,  Dyspneic on presentation      Cardiomegaly on CXR w/o evidence of focal infiltrate or edema     5.  Other chronic problems per HPI       Jessie Zavaleta MD     6/2/2021

## 2021-06-02 NOTE — WOUND CARE
IP WOUND CONSULT    [address] RECORD NUMBER:  [id number]  AGE: 76 y.o. GENDER: male  : 1955  TODAY'S DATE:  2021    GENERAL     [] Follow-up   [x] New Consult    Kemar Conner is a 76 y.o. male referred by:   [x] Physician  [] Nursing  [] Other:         PAST MEDICAL HISTORY    Past Medical History:   Diagnosis Date    Arthritis     Chronic obstructive [facility] disease ([facility] [address]     Diabetes ([facility] [address]     Gout     Hypertension     Ill-defined condition     Sleep apnea         PAST SURGICAL HISTORY    Past Surgical History:   Procedure Laterality Date    HX ORTHOPAEDIC      HX VEIN ABLATION ADHESIVE         FAMILY HISTORY    Family History   Problem Relation Age of Onset    Heart Disease Mother     Kidney Disease Mother     Hypertension Mother     Diabetes Mother     Heart Disease Father     Hypertension Father     Diabetes Sister     Diabetes Brother          ALLERGIES    Allergies   Allergen Reactions    Elavil Unknown (comments)    Sulfa (Sulfonamide Antibiotics) Nausea and Vomiting       MEDICATIONS    Current Facility-Administered Medications on File Prior to Encounter   Medication Dose Route Frequency Provider Last Rate Last Admin    lidocaine (XYLOCAINE) 2 % viscous solution 15 mL  15 mL Topical PRN Collette Choi MD        lidocaine (XYLOCAINE) 2 % viscous solution 15 mL  15 mL Topical PRN Collette Choi MD         Current Outpatient Medications on File Prior to Encounter   Medication Sig Dispense Refill    ascorbic acid, vitamin C, (Vitamin C) 500 mg tablet Take 1,000 mg by mouth two (2) times a day. Indications: inadequate vitamin C      tamsulosin (Flomax) 0.4 mg capsule Take 0.4 mg by mouth daily.  tiotropium (Spiriva with HandiHaler) 18 mcg inhalation capsule Take 1 Capsule by inhalation daily.  bumetanide (BUMEX) 2 mg tablet Take 2 mg by mouth two (2) times a day.       multivits,Stress Formula-Zinc tablet Take 1 Tab by mouth daily.      vitamin e (E GEMS) 1,000 unit capsule Take 1,000 Units by mouth two (2) times a day.  multivitamin (ONE A DAY) tablet Take 1 Tab by mouth daily.  aspirin 81 mg chewable tablet Take 81 mg by mouth daily.  flunisolide (NASAREL) 25 mcg (0.025 %) spry 2 Sprays two (2) times a day.  albuterol (ProAir HFA) 90 mcg/actuation inhaler Take  by inhalation.  colchicine 0.6 mg tablet Take 0.6 mg by mouth daily.  gabapentin (NEURONTIN) 300 mg capsule Take 300 mg by mouth four (4) times daily.  oxyCODONE IR (ROXICODONE) 5 mg immediate release tablet Take 5 mg by mouth every twelve (12) hours.  metFORMIN (GLUCOPHAGE) 500 mg tablet Take 1,000 mg by mouth two (2) times daily (with meals).  insulin lispro (HumaLOG U-100 Insulin) 100 unit/mL injection by SubCUTAneous route. Sliding scale      ferrous sulfate (Iron) 325 mg (65 mg iron) tablet Take  by mouth Daily (before breakfast).  hydroCHLOROthiazide (HYDRODIURIL) 25 mg tablet Take 25 mg by mouth daily.  insulin glargine (LANTUS) 100 unit/mL injection by SubCUTAneous route nightly. 80units am/ 20units pm (Patient not taking: Reported on 6/2/2021)           [unfilled]  Visit Vitals  /76   Pulse 99   Temp 98.9 °F (37.2 °C)   Resp 13   Ht 5' 11\" (1.803 m)   Wt (!) 165.6 kg (365 lb)   SpO2 99%   BMI 50.91 kg/m²       ASSESSMENT     Wound Identification & Type: Chronic stasis ulcer to LLE lateral; stasis ulcer to RLE lateral approximately 33 weeks old per patient. Dressing change: Xeroform, ABD pad, and kerlix to BLEs. Verbal consent for picture: Yes    Contributing Factors: edema, venous stasis, lymphedema, diabetes, poor glucose control, decreased mobility, shear force, obesity and poor hygiene    Wound Leg lower Left;Lateral #1 (Active)   Wound Image   06/02/21 1103   Wound Etiology Venous 06/02/21 1103   Dressing Status Clean;Dry; Intact 06/01/21 1405   Cleansed Cleansed with saline 06/02/21 1103 Dressing/Treatment ABD pad;Roll gauze; Xeroform 06/02/21 1103   Offloading for Diabetic Foot Ulcers No 06/01/21 1405   Dressing Change Due 06/03/21 06/02/21 1103   Wound Length (cm) 10.6 cm 06/02/21 1103   Wound Width (cm) 6.4 cm 06/02/21 1103   Wound Depth (cm) 0.3 cm 06/02/21 1103   Wound Surface Area (cm^2) 67.84 cm^2 06/02/21 1103   Change in Wound Size % -164.79 06/02/21 1103   Wound Volume (cm^3) 20.35 cm^3 06/02/21 1103   Wound Healing % -297 06/02/21 1103   Wound Assessment Pink/red;Subcutaneous 06/02/21 1103   Drainage Amount Scant 06/02/21 1103   Drainage Description Serosanguinous 06/02/21 1103   Wound Odor Moderate 06/01/21 1405   Alissa-Wound/Incision Assessment Dry/flaky;Edematous;Fragile 06/02/21 1103   Edges Defined edges 06/02/21 1103   Wound Thickness Description Full thickness 06/02/21 1103   Number of days: 261       Wound Leg lower Right;Lateral #2 (Active)   Wound Image   06/02/21 1107   Wound Etiology Venous 06/02/21 1107   Dressing Status Clean;Dry; Intact 06/01/21 1404   Cleansed Cleansed with saline 06/02/21 1107   Dressing/Treatment ABD pad;Roll gauze; Xeroform 06/02/21 1107   Offloading for Diabetic Foot Ulcers No offloading required 06/01/21 1404   Dressing Change Due 06/03/21 06/02/21 1107   Wound Length (cm) 10.3 cm 06/02/21 1107   Wound Width (cm) 4.9 cm 06/02/21 1107   Wound Depth (cm) 0.2 cm 06/02/21 1107   Wound Surface Area (cm^2) 50.47 cm^2 06/02/21 1107   Change in Wound Size % -40.19 06/02/21 1107   Wound Volume (cm^3) 10.09 cm^3 06/02/21 1107   Wound Healing % -180 06/02/21 1107   Wound Assessment Dry;Pink/red 06/02/21 1107   Drainage Amount None 06/02/21 1107   Drainage Description Serosanguinous 06/01/21 1404   Wound Odor None 06/02/21 1107   Alissa-Wound/Incision Assessment Dry/flaky;Edematous;Fragile 06/02/21 1107   Edges Defined edges 06/02/21 1107   Wound Thickness Description Full thickness 06/02/21 1107   Number of days: 8          PLAN     Skin Care & Pressure Relief Recommendations  Minimize layers of linen  Turn/reposition approximately every 2 hours  Pillow wedges  Offload heels pillows    Kumar Saunders  Blood Glucose: 169 on 6/1/21                             Albumin: No recent value  WBCs: 10.3 on 6/1/21    Support Surface: Gel mattress    Physician/Provider notified: Dr. Mark Rasmussen  Recommendations: Darren Domingo Vascular Surgeon for venous stasis ulcers to BLEs. Unable to palpate pulses to both dorsalis pedis and posterior tibial.  BLEs are edematous. See dressing order for BLEs, may change after examined by Vascular Surgeon. Elevate BLEs with pillows or knee gatch on bed while in bed to help reduce swelling / edema if not contraindicated. Patient was previously in unna boots but deterioration noted after examination by Dr. Tosin Griffin at the [facility] on 6/1/21. Patient stated he is ambulatory and independent with most ADLs except bathing. He was witnessed sitting up in bed independently. Creatinine elevated at 1.70 and BUN at 34. Consider fluid intake restriction if diuresing is not a option at this moment due to kidney function. Will continue to follow. Teaching completed with:   [x] Patient           [] Family member       [] Caregiver          [] Nursing  [] Other    Patient/Caregiver Teaching: Uncontrolled edema / swelling will delay wound healing and promote development of new ulcerations. Must elevate BLEs to help reduce swelling to legs and feet. Patient verbalized understanding but wants to sit on side of bed with feet dependent.       Level of patient/caregiver understanding able to:   [] Indicates understanding       [x] Needs reinforcement  [] Unsuccessful      [x] Verbal Understanding  [] Demonstrated understanding       [] No evidence of learning  [] Refused teaching         [] N/A       Electronically signed by Prasanth Eden RN on 6/2/2021 at 11:11 AM

## 2021-06-02 NOTE — PROGRESS NOTES
Bedside and Verbal shift change report given to Liza Frank LPN (oncoming nurse) by Deena Jones RN (offgoing nurse). Report included the following information SBAR, Intake/Output, MAR, Recent Results, Med Rec Status and Cardiac Rhythm NSR.

## 2021-06-02 NOTE — PROGRESS NOTES
Problem: Falls - Risk of  Goal: *Absence of Falls  Description: Document Mily Constantino Fall Risk and appropriate interventions in the flowsheet.   Outcome: Progressing Towards Goal  Note: Fall Risk Interventions:  Mobility Interventions: Utilize walker, cane, or other assistive device                             Problem: Patient Education: Go to Patient Education Activity  Goal: Patient/Family Education  Outcome: Progressing Towards Goal     Problem: General Infection Care Plan (Adult and Pediatric)  Goal: Improvement in signs and symptoms of infection  Outcome: Progressing Towards Goal  Goal: *Optimize nutritional status  Outcome: Progressing Towards Goal     Problem: Patient Education: Go to Patient Education Activity  Goal: Patient/Family Education  Outcome: Progressing Towards Goal

## 2021-06-02 NOTE — CONSULTS
PULMONARY CONSULT  [facility]    Name: Yg Rosas MRN: [id number]   : 1955 Hospital: [address] [place] [address]   Date: 2021  Admission date: 2021 Hospital Day: 2       HPI:     Hospital Problems  Date Reviewed: 2021        Codes Class Noted POA    Cellulitis and abscess of right leg ICD-10-CM: L03.115, L02.415  ICD-9-CM: 682.6  2021 Unknown        Cellulitis ICD-10-CM: L03.90  ICD-9-CM: 682.9  2021 Unknown                   [x] High complexity decision making was performed  [x] See my orders for details      Subjective/Initial History:     I was asked by Allyn Carbone MD to see Yg Rosas  a 76 y.o.  male in consultation     Excerpts from admission 2021 or consult notes as follows:   58-year-old morbidly obese gentleman came in because of leg pain and swelling he has history of venous stasis leg edema diabetes mellitus, significant past medical history of COPD chronic hypoxic hypercapnic respiratory failure on trilogy noninvasive ventilator at home he was seen by me about a month ago he was doing fairly well he was compliant with his trilogy ventilator came in because of swelling of the lower extremities his BNP was elevated.        Allergies   Allergen Reactions    Elavil Unknown (comments)    Sulfa (Sulfonamide Antibiotics) Nausea and Vomiting        MAR reviewed and pertinent medications noted or modified as needed     Current Facility-Administered Medications   Medication    piperacillin-tazobactam (ZOSYN) 3.375 g in 0.9% sodium chloride (MBP/ADV) 100 mL MBP    glucose chewable tablet 16 g    dextrose (D50W) injection syrg 12.5-25 g    glucagon (GLUCAGEN) injection 1 mg    oxyCODONE IR (ROXICODONE) tablet 5 mg    vancomycin (VANCOCIN) 1,500 mg in 0.9% sodium chloride 500 mL IVPB    Vancomycin Pharmacy Dosing    [START ON 2021] Vancomycin Trough Level scheduled for 21 at 1600    albuterol (PROVENTIL HFA, VENTOLIN HFA, PROAIR HFA) inhaler 2 Puff    ascorbic acid (vitamin C) (VITAMIN C) tablet 1,000 mg    [START ON 6/3/2021] aspirin chewable tablet 81 mg    bumetanide (BUMEX) tablet 2 mg    [START ON 6/3/2021] colchicine tablet 0.6 mg    [START ON 6/3/2021] ferrous sulfate tablet 325 mg    . PHARMACY TO SUBSTITUTE PER PROTOCOL (Reordered from: flunisolide (NASAREL) 25 mcg (0.025 %) spry)    gabapentin (NEURONTIN) capsule 300 mg    [START ON 6/3/2021] hydroCHLOROthiazide (HYDRODIURIL) tablet 25 mg    insulin glargine (LANTUS) injection 20 Units    metFORMIN (GLUCOPHAGE) tablet 1,000 mg    [START ON 6/3/2021] multivitamin (ONE A DAY) tablet 1 Tablet    [START ON 6/3/2021] tamsulosin (FLOMAX) capsule 0.4 mg    tiotropium bromide (SPIRIVA RESPIMAT) 2.5 mcg /actuation    vitamin e (E GEMS) capsule 1,000 Units    insulin lispro (HUMALOG) injection    [Held by provider] 0.9% sodium chloride infusion    acetaminophen (TYLENOL) tablet 650 mg    Or    acetaminophen (TYLENOL) suppository 650 mg    polyethylene glycol (MIRALAX) packet 17 g    promethazine (PHENERGAN) tablet 12.5 mg    Or    ondansetron (ZOFRAN) injection 4 mg    oxyCODONE IR (ROXICODONE) tablet 10 mg     Facility-Administered Medications Ordered in Other Encounters   Medication    lidocaine (XYLOCAINE) 2 % viscous solution 15 mL    lidocaine (XYLOCAINE) 2 % viscous solution 15 mL      Patient PCP: Kevin Green MD  PMH:  has a past medical history of Arthritis, Chronic obstructive pulmonary disease ([facility] [address] Diabetes ([facility] [address] Gout, Hypertension, Ill-defined condition, and Sleep apnea. PSH:   has a past surgical history that includes hx orthopaedic and hx vein ablation adhesive. FHX: family history includes Diabetes in his brother, mother, and sister; Heart Disease in his father and mother; Hypertension in his father and mother; Kidney Disease in his mother. SHX:  reports that he has never smoked.  He has never used smokeless tobacco. He reports current alcohol use. He reports that he does not use drugs. ROS:    Review of Systems   Constitutional: Positive for malaise/fatigue. HENT: Negative. Eyes: Negative. Respiratory: Positive for shortness of breath. Cardiovascular: Positive for orthopnea and claudication. Gastrointestinal: Negative. Genitourinary: Negative. Musculoskeletal:        Bilateral leg edema   Skin: Negative. Neurological: Negative. Psychiatric/Behavioral: Negative. Objective:     Vital Signs: Telemetry:    normal sinus rhythm Intake/Output:   Visit Vitals  /66   Pulse 90   Temp 98.4 °F (36.9 °C)   Resp 25   Ht 5' 11\" (1.803 m)   Wt (!) 165.6 kg (365 lb)   SpO2 98%   BMI 50.91 kg/m²       Temp (24hrs), Av.7 °F (37.1 °C), Min:98.1 °F (36.7 °C), Max:99.1 °F (37.3 °C)        O2 Device: Nasal cannula O2 Flow Rate (L/min): 3 l/min       Wt Readings from Last 4 Encounters:   21 (!) 165.6 kg (365 lb)   21 136.1 kg (300 lb)   20 (!) 166.5 kg (367 lb)          Intake/Output Summary (Last 24 hours) at 2021 1724  Last data filed at 2021 1048  Gross per 24 hour   Intake --   Output 410 ml   Net -410 ml       Last shift:       0701 -  1900  In: -   Out: 410 [Urine:410]  Last 3 shifts: No intake/output data recorded. Physical Exam:     Physical Exam  Constitutional:       Appearance: He is obese. HENT:      Head: Normocephalic and atraumatic. Nose: Nose normal.      Mouth/Throat:      Mouth: Mucous membranes are dry. Eyes:      Pupils: Pupils are equal, round, and reactive to light. Cardiovascular:      Rate and Rhythm: Normal rate and regular rhythm. Pulses: Normal pulses. Heart sounds: Normal heart sounds. Pulmonary:      Effort: Respiratory distress present. Breath sounds: Rales present. Abdominal:      General: Abdomen is flat. Bowel sounds are normal.      Palpations: Abdomen is soft.    Musculoskeletal:         General: Swelling present. Cervical back: Normal range of motion and neck supple. Right lower leg: Edema present. Left lower leg: Edema present. Comments: Erythema tenderness on deep palpation  bilateral lower extremities   Neurological:      Mental Status: He is alert. Labs:    Recent Labs     06/01/21 2020   WBC 10.3   HGB 12.3        Recent Labs     06/01/21 2020      K 3.8      CO2 27   *   BUN 34*   CREA 1.70*   CA 9.4   LAC 2.8*     No results for input(s): PH, PCO2, PO2, HCO3, FIO2 in the last 72 hours. Recent Labs     06/01/21 2020   TROIQ <0.05     No results found for: BNPP, BNP   Lab Results   Component Value Date/Time    Culture result: No growth after 1 hour 06/01/2021 08:15 PM    Culture result: Light Enterobacter cloacae complex 05/10/2021 02:45 PM    Culture result: Light Enterococcus faecalisgroup D 05/10/2021 02:45 PM    Culture result: Scant Alcaligenes faecalis 05/10/2021 02:45 PM   No results found for: TSH, TSHEXT    Imaging:    CXR Results  (Last 48 hours)               06/01/21 2003  XR CHEST PORT Final result    Impression:  Stable mild-to-moderate enlargement of cardiomediastinal silhouette. Mediastinum underpenetrated with suboptimal visualization of right atrial and   right ventricular pacing leads. There is obscuration of a portion of the left   hemithorax by the generator device. No pneumothorax. No evidence of pulmonary   edema, air space pneumonia, or pleural effusion. Narrative:  Portable chest, 1949 hours. Comparison with 6/22/2020. Results from [place] encounter on 06/01/21    XR CHEST PORT    Narrative  Portable chest, 1949 hours. Comparison with 6/22/2020. Impression  Stable mild-to-moderate enlargement of cardiomediastinal silhouette. Mediastinum underpenetrated with suboptimal visualization of right atrial and  right ventricular pacing leads.  There is obscuration of a portion of the left  hemithorax by the generator device. No pneumothorax. No evidence of pulmonary  edema, air space pneumonia, or pleural effusion. No results found for this or any previous visit. IMPRESSION:   1. Acute on chronic hypoxic hypercapnic respiratory failure  2. Chronic Obstructive Pulmonary Disease   3. Venous stasis ulcer with leg wound chronic leg edema  4. CHF with diastolic dysfunction  5. Obstructive sleep apnea  6. Obesity gout hypertension type 2 diabetes  7. Acute kidney injury  8. Pt is requiring Drug therapy requiring intensive monitoring for toxicity  9. Pt is unstable, unpredictable needing inpatient monitoring; is acutely ill and at high risk of sudden decline and decompensation with severe consequenses and continued end organ dysfunction and failure  10. Prognosis guarded       RECOMMENDATIONS/PLAN:     1. Trilogy for non invasive ventilatory life support to prevent worsening respiratory acidosis  2. Patient is not actively wheezing no need for any systemic steroids  3. Continue with Bumex  4. Patient is on vancomycin and Zosyn  5. We will get blood levels in a.m.  6. Supplemental O2 to keep sats > 93%  7. Aspiration precautions  8. Labs to follow electrolytes, renal function and and blood counts  9. Glucose monitoring and SSI  10. Bronchial hygiene with respiratory therapy techniques, bronchodilators  11.  DVT, SUP prophylaxis       This care involved high complexity medical decision making: I personally:  · Reviewed the flowsheet and previous days notes  · Reviewed and summarized records or history from previous days note or discussions with staff, family  · High Risk Drug therapy requiring intensive monitoring for toxicity: eg steroids, pressors, antibiotics  · Reviewed and/or ordered Clinical lab tests  · Reviewed images and/or ordered Radiology tests  · Reviewed the patients ECG / Telemetry  · Reviewed and/or adjusted NiPPV settings  · Called and arranged for Radiologic procedures or interventions  · performed or ordered Diagnostic endoscopies with identified risk factors.   · discussed my assessment/management with : Nursing, Hospitalist and Family for coordination of care          Todd Modi MD

## 2021-06-02 NOTE — H&P
History and Physical    NAME: Deepali Del Real   :  1955   MRN:  [id number]     Date/Time:  2021 8:42 AM    Patient PCP: Cindy Gomes MD  ______________________________________________________________________             Subjective:     CHIEF COMPLAINT:     Worsening legs cellulitis    HISTORY OF PRESENT ILLNESS:       Patient is a 76y.o. year old male with a history of COPD, diabetes, gout, hypertension, arthritis, and sleep apnea. He uses a Trilogy machine at home anytime he is laying down, which is about 16 hours a day. Recent labs showed elevated BNP (1,545). He has venous ulcers on his lower legs bilaterally that were being treated by wound care for several months with no improvement. Wound care MD  recommended admission to the hospital for multidisplinary management of his recalcitrant venous ulcers. He was seen in the ER  and admitted. The ulcer on his left leg has been present for about 8 months and the ulcer on his right leg has been present for about 10 days. Today the patient describes the pain in both of his legs as a \"10+/10\". Pain is constant and worse with ambulation The Dilaudid helped to take the edge off, but he only had relief for 2 hours. The pain has not been well-controlled today and he cannot get comfortable. He also complains of SOB with exertion. Denies current chest pain, abdominal pain, nausea, and vomiting.       Past Medical History:   Diagnosis Date    Arthritis     Chronic obstructive pulmonary disease ([facility] [address]     Diabetes ([facility] [address]     Gout     Hypertension     Ill-defined condition     Sleep apnea         Past Surgical History:   Procedure Laterality Date    HX ORTHOPAEDIC      HX VEIN ABLATION ADHESIVE         Social History     Tobacco Use    Smoking status: Never Smoker    Smokeless tobacco: Never Used   Substance Use Topics    Alcohol use: Yes        Family History   Problem Relation Age of Onset    Heart Disease Mother     Kidney Disease Mother  Hypertension Mother     Diabetes Mother     Heart Disease Father     Hypertension Father     Diabetes Sister     Diabetes Brother        Allergies   Allergen Reactions    Elavil Unknown (comments)    Sulfa (Sulfonamide Antibiotics) Nausea and Vomiting        Prior to Admission medications    Medication Sig Start Date End Date Taking? Authorizing Provider   ascorbic acid, vitamin C, (Vitamin C) 500 mg tablet Take 1,000 mg by mouth two (2) times a day. Indications: inadequate vitamin C   Yes Provider, Historical   tamsulosin (Flomax) 0.4 mg capsule Take 0.4 mg by mouth daily. Yes Provider, Historical   tiotropium (Spiriva with HandiHaler) 18 mcg inhalation capsule Take 1 Capsule by inhalation daily. Yes Provider, Historical   bumetanide (BUMEX) 2 mg tablet Take 2 mg by mouth two (2) times a day. Yes Provider, Historical   multivits,Stress Formula-Zinc tablet Take 1 Tab by mouth daily. Yes Provider, Historical   vitamin e (E GEMS) 1,000 unit capsule Take 1,000 Units by mouth two (2) times a day. Yes Provider, Historical   multivitamin (ONE A DAY) tablet Take 1 Tab by mouth daily. Yes Provider, Historical   aspirin 81 mg chewable tablet Take 81 mg by mouth daily. Yes Provider, Historical   flunisolide (NASAREL) 25 mcg (0.025 %) spry 2 Sprays two (2) times a day. Yes Provider, Historical   albuterol (ProAir HFA) 90 mcg/actuation inhaler Take  by inhalation. Yes Provider, Historical   colchicine 0.6 mg tablet Take 0.6 mg by mouth daily. Yes Provider, Historical   gabapentin (NEURONTIN) 300 mg capsule Take 300 mg by mouth four (4) times daily. Yes Provider, Historical   oxyCODONE IR (ROXICODONE) 5 mg immediate release tablet Take 5 mg by mouth every twelve (12) hours. Yes Provider, Historical   metFORMIN (GLUCOPHAGE) 500 mg tablet Take 1,000 mg by mouth two (2) times daily (with meals).    Yes Provider, Historical   insulin lispro (HumaLOG U-100 Insulin) 100 unit/mL injection by SubCUTAneous route. Sliding scale   Yes Provider, Historical   ferrous sulfate (Iron) 325 mg (65 mg iron) tablet Take  by mouth Daily (before breakfast). Provider, Historical   hydroCHLOROthiazide (HYDRODIURIL) 25 mg tablet Take 25 mg by mouth daily. Provider, Historical   insulin glargine (LANTUS) 100 unit/mL injection by SubCUTAneous route nightly.  80units am/ 20units pm  Patient not taking: Reported on 6/2/2021    Provider, Historical         Current Facility-Administered Medications:     piperacillin-tazobactam (ZOSYN) 3.375 g in 0.9% sodium chloride (MBP/ADV) 100 mL MBP, 3.375 g, IntraVENous, Q8H, Moni Patel MD, Last Rate: 25 mL/hr at 06/02/21 0611, 3.375 g at 06/02/21 9319    glucose chewable tablet 16 g, 4 Tablet, Oral, PRN, Melanie Patel MD    dextrose (D50W) injection syrg 12.5-25 g, 25-50 mL, IntraVENous, PRN, Melanie Patel MD    glucagon (GLUCAGEN) injection 1 mg, 1 mg, IntraMUSCular, PRN, Melanie Patel MD    oxyCODONE IR (ROXICODONE) tablet 5 mg, 5 mg, Oral, Q12H PRN, Moni Patel MD, 5 mg at 06/02/21 0411    vancomycin (VANCOCIN) 1,500 mg in 0.9% sodium chloride 500 mL IVPB, 1,500 mg, IntraVENous, Q18H, Melanie Patel MD    Vancomycin Pharmacy Dosing, , Other, Rx Dosing/Monitoring, Berto Ortiz MD    [START ON 6/4/2021] Vancomycin Trough Level scheduled for 6-4-21 at 1600, , Other, ONCE, Melanie Patel MD    albuterol (PROVENTIL HFA, VENTOLIN HFA, PROAIR HFA) inhaler 2 Puff, 2 Puff, Inhalation, Q6H PRN, Melanie Patel MD    ascorbic acid (vitamin C) (VITAMIN C) tablet 1,000 mg, 1,000 mg, Oral, BID, AmandaMelanie cunningham MD Blanca Pheasant  [START ON 6/3/2021] aspirin chewable tablet 81 mg, 81 mg, Oral, DAILY, Moni Patel MD    bumetanide (BUMEX) tablet 2 mg, 2 mg, Oral, BID, Moni Patel MD Blanca Pheasant  [START ON 6/3/2021] colchicine tablet 0.6 mg, 0.6 mg, Oral, DAILY, Moni Patel MD    [START ON 6/3/2021] ferrous sulfate tablet 325 mg, 1 Tablet, Oral, ACB, MD Cathie Ham  . PHARMACY TO SUBSTITUTE PER PROTOCOL (Reordered from: flunisolide (NASAREL) 25 mcg (0.025 %) spry), , , Per Protocol, Jacoby Patel MD    gabapentin (NEURONTIN) capsule 300 mg, 300 mg, Oral, QID, Jacoby Patel MD Maryann Salines  [START ON 6/3/2021] hydroCHLOROthiazide (HYDRODIURIL) tablet 25 mg, 25 mg, Oral, DAILY, Jacoby Patel MD    insulin glargine (LANTUS) injection 20 Units, 20 Units, SubCUTAneous, QHS, Jacoby Patel MD    metFORMIN (GLUCOPHAGE) tablet 1,000 mg, 1,000 mg, Oral, BID WITH MEALS, Jacoby Patel MD Maryann Salines  [START ON 6/3/2021] multivitamin (ONE A DAY) tablet 1 Tablet, 1 Tablet, Oral, DAILY, Jacoby Patel MD    oxyCODONE IR (ROXICODONE) tablet 5 mg, 5 mg, Oral, Q12H, Jacoby Patel MD    [START ON 6/3/2021] tamsulosin (FLOMAX) capsule 0.4 mg, 0.4 mg, Oral, DAILY, Jacoby Patel MD    tiotropium bromide (SPIRIVA RESPIMAT) 2.5 mcg /actuation, 5 mcg, Inhalation, DAILY, Moni Patel MD    vitamin e (E GEMS) capsule 1,000 Units, 1,000 Units, Oral, BID, Jacoby Patel MD    insulin lispro (HUMALOG) injection, , SubCUTAneous, AC&HS, Prudencio Mcdermott MD    [Held by provider] 0.9% sodium chloride infusion, 75 mL/hr, IntraVENous, CONTINUOUS, Jacoby Patel MD    acetaminophen (TYLENOL) tablet 650 mg, 650 mg, Oral, Q6H PRN **OR** acetaminophen (TYLENOL) suppository 650 mg, 650 mg, Rectal, Q6H PRN, Jacoby Patel MD    polyethylene glycol (MIRALAX) packet 17 g, 17 g, Oral, DAILY PRN, Jacoby Patel MD    promethazine (PHENERGAN) tablet 12.5 mg, 12.5 mg, Oral, Q6H PRN **OR** ondansetron (ZOFRAN) injection 4 mg, 4 mg, IntraVENous, Q6H PRN, Prudencio Mcdermott MD    Facility-Administered Medications Ordered in Other Encounters:     lidocaine (XYLOCAINE) 2 % viscous solution 15 mL, 15 mL, Topical, PRN, Marlena Stone MD    lidocaine (XYLOCAINE) 2 % viscous solution 15 mL, 15 mL, Topical, PRN, Marlena Stone MD    LAB DATA REVIEWED: Recent Results (from the past 24 hour(s))   EKG, 12 LEAD, INITIAL    Collection Time: 06/01/21  6:56 PM   Result Value Ref Range    Ventricular Rate 104 BPM    Atrial Rate 104 BPM    P-R Interval 116 ms    QRS Duration 72 ms    Q-T Interval 300 ms    QTC Calculation (Bezet) 394 ms    Calculated P Axis 93 degrees    Calculated R Axis 180 degrees    Calculated T Axis 89 degrees    Diagnosis       Electronic ventricular pacemaker  Confirmed by Dorcas Beltran ([id number]) on 6/2/2021 6:57:44 AM     CULTURE, BLOOD, PAIRED    Collection Time: 06/01/21  8:15 PM    Specimen: Blood   Result Value Ref Range    Special Requests: No Special Requests      Culture result: No growth after 1 hour     CBC WITH AUTOMATED DIFF    Collection Time: 06/01/21  8:20 PM   Result Value Ref Range    WBC 10.3 4.1 - 11.1 K/uL    RBC 4.41 4.10 - 5.70 M/uL    HGB 12.3 12.1 - 17.0 g/dL    HCT 39.9 36.6 - 50.3 %    MCV 90.5 80.0 - 99.0 FL    MCH 27.9 26.0 - 34.0 PG    MCHC 30.8 30.0 - 36.5 g/dL    RDW 15.0 (H) 11.5 - 14.5 %    PLATELET 393 830 - 994 K/uL    MPV 10.7 8.9 - 12.9 FL    NRBC 0.0 0.0  WBC    ABSOLUTE NRBC 0.00 0.00 - 0.01 K/uL    NEUTROPHILS 75 32 - 75 %    LYMPHOCYTES 15 12 - 49 %    MONOCYTES 9 5 - 13 %    EOSINOPHILS 1 0 - 7 %    BASOPHILS 0 0 - 1 %    IMMATURE GRANULOCYTES 0 0 - 0.5 %    ABS. NEUTROPHILS 7.7 1.8 - 8.0 K/UL    ABS. LYMPHOCYTES 1.5 0.8 - 3.5 K/UL    ABS. MONOCYTES 0.9 0.0 - 1.0 K/UL    ABS. EOSINOPHILS 0.1 0.0 - 0.4 K/UL    ABS. BASOPHILS 0.0 0.0 - 0.1 K/UL    ABS. IMM.  GRANS. 0.0 0.00 - 0.04 K/UL    DF AUTOMATED     METABOLIC PANEL, BASIC    Collection Time: 06/01/21  8:20 PM   Result Value Ref Range    Sodium 138 136 - 145 mmol/L    Potassium 3.8 3.5 - 5.1 mmol/L    Chloride 101 97 - 108 mmol/L    CO2 27 21 - 32 mmol/L    Anion gap 10 5 - 15 mmol/L    Glucose 169 (H) 65 - 100 mg/dL    BUN 34 (H) 6 - 20 mg/dL    Creatinine 1.70 (H) 0.70 - 1.30 mg/dL    BUN/Creatinine ratio 20 12 - 20      GFR est AA 49 (L) >60 ml/min/1.73m2    GFR est non-AA 41 (L) >60 ml/min/1.73m2    Calcium 9.4 8.5 - 10.1 mg/dL   TROPONIN I    Collection Time: 06/01/21  8:20 PM   Result Value Ref Range    Troponin-I, Qt. <0.05 <0.05 ng/mL   BNP    Collection Time: 06/01/21  8:20 PM   Result Value Ref Range    NT pro-BNP 1,545 (H) <125 pg/mL   LACTIC ACID    Collection Time: 06/01/21  8:20 PM   Result Value Ref Range    Lactic acid 2.8 (HH) 0.4 - 2.0 mmol/L   GLUCOSE, POC    Collection Time: 06/02/21 10:06 AM   Result Value Ref Range    Glucose (POC) 186 (H) 65 - 117 mg/dL    Performed by Watson C[place](PCT)        XR Results (most recent):  Results from Hospital Encounter encounter on 06/01/21    XR CHEST PORT    Narrative  Portable chest, 1949 hours. Comparison with 6/22/2020. Impression  Stable mild-to-moderate enlargement of cardiomediastinal silhouette. Mediastinum underpenetrated with suboptimal visualization of right atrial and  right ventricular pacing leads. There is obscuration of a portion of the left  hemithorax by the generator device. No pneumothorax. No evidence of pulmonary  edema, air space pneumonia, or pleural effusion. XR CHEST PORT   Final Result   Stable mild-to-moderate enlargement of cardiomediastinal silhouette. Mediastinum underpenetrated with suboptimal visualization of right atrial and   right ventricular pacing leads. There is obscuration of a portion of the left   hemithorax by the generator device. No pneumothorax. No evidence of pulmonary   edema, air space pneumonia, or pleural effusion. Review of Systems:  Constitutional: Negative for chills and fever. HENT: Negative. Eyes: Negative. Respiratory: SOB with exertion  Cardiovascular: Negative. Gastrointestinal: Negative for abdominal pain and nausea. Skin: BL venous ulcers   Neurological: Negative.       Objective:   VITALS:    Visit Vitals  /76   Pulse 99   Temp 98.9 °F (37.2 °C)   Resp 13   Ht 5' 11\" (1.803 m)   Wt (!) 165.6 kg (365 lb)   SpO2 96%   BMI 50.91 kg/m²       Physical Exam:   Constitutional: pt is oriented to person, place, and time. HENT:   Head: Normocephalic and atraumatic. Eyes: Pupils are equal, round, and reactive to light. EOM are normal.   Cardiovascular: Normal rate, regular rhythm and normal heart sounds. Pulmonary/Chest: Breath sounds normal. No wheezes. No rales. Exhibits no tenderness. Abdominal: Soft. Bowel sounds are normal. There is no abdominal tenderness. There is no rebound and no guarding. Musculoskeletal: Normal range of motion. Neurological: pt is alert and oriented to person, place, and time. Alert. Normal strength. No cranial nerve deficit or sensory deficit. Displays a negative Romberg sign. Extremities:  Venous ulcers R and L lateral lower legs with surrounding discoloration of lower extremities      ASSESSMENT & PLAN:  Acute cellulitis of the both leg  Venous stasis ulcer  Chronic hypoxemic respiratory failure  History of CHF diastolic dysfunction  COPD  Type 2 diabetes  Hypertension  Gout  Arthritis   Sleep Apnea   Morbid obesity    Consult wound care. Consult ID for cellulitis. Continue Vancomycin and Zosyn. Consult cardiology for elevated BNP. Continue blood glucose monitoring. Sliding scale insulin. Oxycodone IR for pain management.    Recheck labs in AM.     Current Facility-Administered Medications:     piperacillin-tazobactam (ZOSYN) 3.375 g in 0.9% sodium chloride (MBP/ADV) 100 mL MBP, 3.375 g, IntraVENous, Q8H, Moni Patel MD, Last Rate: 25 mL/hr at 06/02/21 0611, 3.375 g at 06/02/21 6295    glucose chewable tablet 16 g, 4 Tablet, Oral, PRN, Moni Patel MD    dextrose (D50W) injection syrg 12.5-25 g, 25-50 mL, IntraVENous, PRN, Kathi Patel MD    glucagon (GLUCAGEN) injection 1 mg, 1 mg, IntraMUSCular, PRN, Moni Patel MD    oxyCODONE IR (ROXICODONE) tablet 5 mg, 5 mg, Oral, Q12H PRN, Moni Patel MD, 5 mg at 06/02/21 0411    vancomycin (VANCOCIN) 1,500 mg in 0.9% sodium chloride 500 mL IVPB, 1,500 mg, IntraVENous, Q18H, Moni Patel MD    Vancomycin Pharmacy Dosing, , Other, Rx Dosing/Monitoring, Oumou Carrillo MD    [START ON 6/4/2021] Vancomycin Trough Level scheduled for 6-4-21 at 1600, , Other, ONCE, Moni Patel MD    albuterol (PROVENTIL HFA, VENTOLIN HFA, PROAIR HFA) inhaler 2 Puff, 2 Puff, Inhalation, Q6H PRN, Gabe Patel MD    ascorbic acid (vitamin C) (VITAMIN C) tablet 1,000 mg, 1,000 mg, Oral, BID, Gabe Patel MD [place]  [START ON 6/3/2021] aspirin chewable tablet 81 mg, 81 mg, Oral, DAILY, Moni Patel MD    bumetanide (BUMEX) tablet 2 mg, 2 mg, Oral, BID, Moni Patel MD [place]  [START ON 6/3/2021] colchicine tablet 0.6 mg, 0.6 mg, Oral, DAILY, Moni Patel MD    [START ON 6/3/2021] ferrous sulfate tablet 325 mg, 1 Tablet, Oral, ACB, MD James Lee  . PHARMACY TO SUBSTITUTE PER PROTOCOL (Reordered from: flunisolide (NASAREL) 25 mcg (0.025 %) spry), , , Per Protocol, Gabe Patel MD    gabapentin (NEURONTIN) capsule 300 mg, 300 mg, Oral, QID, Gabe Patel MD [place]  [START ON 6/3/2021] hydroCHLOROthiazide (HYDRODIURIL) tablet 25 mg, 25 mg, Oral, DAILY, Moni Patel MD    insulin glargine (LANTUS) injection 20 Units, 20 Units, SubCUTAneous, QHS, Moni Patel MD    metFORMIN (GLUCOPHAGE) tablet 1,000 mg, 1,000 mg, Oral, BID WITH MEALS, Gabe Patel MD [place]  [START ON 6/3/2021] multivitamin (ONE A DAY) tablet 1 Tablet, 1 Tablet, Oral, DAILY, Moni Patel MD    oxyCODONE IR (ROXICODONE) tablet 5 mg, 5 mg, Oral, Q12H, Moni Patel MD    [START ON 6/3/2021] tamsulosin (FLOMAX) capsule 0.4 mg, 0.4 mg, Oral, DAILY, Gabe Patel MD    tiotropium bromide (SPIRIVA RESPIMAT) 2.5 mcg /actuation, 5 mcg, Inhalation, DAILY, Moni Patel MD    vitamin e (E GEMS) capsule 1,000 Units, 1,000 Units, Oral, BID, Gabe Patel MD    insulin lispro (HUMALOG) injection, , SubCUTAneous, AC&HS, Hussain Flores MD  [facility] by provider] 0.9% sodium chloride infusion, 75 mL/hr, IntraVENous, CONTINUOUS, Jenaro Patel MD    acetaminophen (TYLENOL) tablet 650 mg, 650 mg, Oral, Q6H PRN **OR** acetaminophen (TYLENOL) suppository 650 mg, 650 mg, Rectal, Q6H PRN, Jenaro Patel MD    polyethylene glycol (MIRALAX) packet 17 g, 17 g, Oral, DAILY PRN, Jenaro Patel MD    promethazine (PHENERGAN) tablet 12.5 mg, 12.5 mg, Oral, Q6H PRN **OR** ondansetron (ZOFRAN) injection 4 mg, 4 mg, IntraVENous, Q6H PRN, Moni Patel MD    Facility-Administered Medications Ordered in Other Encounters:     lidocaine (XYLOCAINE) 2 % viscous solution 15 mL, 15 mL, Topical, PRN, Randa David MD    lidocaine (XYLOCAINE) 2 % viscous solution 15 mL, 15 mL, Topical, PRN, Randa David MD              ________________________________________________________________________    Signed: Shaji Richards MD

## 2021-06-02 NOTE — PROGRESS NOTES
06/02/21 0330  IP CONSULT TO PHARMACY - VANCOMYCIN DOSING ONE TIME    Complete   Comments: Pharmacy will order the necessary lab work, if needed, to complete the dosing and ongoing monitoring of requested drug therapy. Details will be updated within the patients Progress Notes. Ordering Provider: Keri Stallings MD   Authorizing Provider: Keri Stallings MD   Question Answer Comment   Antibiotic Indications Skin and Soft Tissue Infection    Skin duration of therapy Other            Pt received initial dose of 1gm (ordered by ED provider). Will begin vancomycin 1500mg q18 hour today at 12noon.   Trough level scheduled for 6-4-21 prior to 1800 dose

## 2021-06-02 NOTE — H&P
History and Physical    NAME: Rajani Gustafson   :  1955   MRN:  [id number]     Date/Time:  2021 8:42 AM    Patient PCP: Prudencio Mcdermott MD  ______________________________________________________________________             Subjective:     CHIEF COMPLAINT:         HISTORY OF PRESENT ILLNESS:       Patient is a 76y.o. year old male with a history of COPD, diabetes, gout, hypertension, arthritis, and sleep apnea. He uses a Trilogy machine at home anytime he is laying down, which is about 16 hours a day. Recent labs showed elevated BNP (1,545). He has venous ulcers on his lower legs bilaterally that were being treated by wound care for several months with no improvement. Wound care recommended admission to the hospital for multidisplinary management of his recalcitrant venous ulcers. He was seen in the ER  and admitted. The ulcer on his left leg has been present for about 8 months and the ulcer on his right leg has been present for about 10 days. Today the patient describes the pain in both of his legs as a \"10+/10\". Pain is constant and worse with ambulation The Dilaudid helped to take the edge off, but he only had relief for 2 hours. The pain has not been well-controlled today and he cannot get comfortable. He also complains of SOB with exertion. Denies current chest pain, abdominal pain, nausea, and vomiting.       Past Medical History:   Diagnosis Date    Arthritis     [facility]     Diabetes ([facility]     Gout     Hypertension     Ill-defined condition     Sleep apnea         Past Surgical History:   Procedure Laterality Date    HX ORTHOPAEDIC      HX VEIN ABLATION ADHESIVE         Social History     Tobacco Use    Smoking status: Never Smoker    Smokeless tobacco: Never Used   Substance Use Topics    Alcohol use: Yes        Family History   Problem Relation Age of Onset    Heart Disease Mother     Kidney Disease Mother     Hypertension Mother    Cathie Goodwin Diabetes Mother     Heart Disease Father     Hypertension Father     Diabetes Sister     Diabetes Brother        Allergies   Allergen Reactions    Elavil Unknown (comments)    Sulfa (Sulfonamide Antibiotics) Nausea and Vomiting        Prior to Admission medications    Medication Sig Start Date End Date Taking? Authorizing Provider   ascorbic acid, vitamin C, (Vitamin C) 500 mg tablet Take 1,000 mg by mouth two (2) times a day. Indications: inadequate vitamin C   Yes Provider, Historical   tamsulosin (Flomax) 0.4 mg capsule Take 0.4 mg by mouth daily. Yes Provider, Historical   tiotropium (Spiriva with HandiHaler) 18 mcg inhalation capsule Take 1 Capsule by inhalation daily. Yes Provider, Historical   bumetanide (BUMEX) 2 mg tablet Take 2 mg by mouth two (2) times a day. Yes Provider, Historical   multivits,Stress Formula-Zinc tablet Take 1 Tab by mouth daily. Yes Provider, Historical   vitamin e (E GEMS) 1,000 unit capsule Take 1,000 Units by mouth two (2) times a day. Yes Provider, Historical   multivitamin (ONE A DAY) tablet Take 1 Tab by mouth daily. Yes Provider, Historical   aspirin 81 mg chewable tablet Take 81 mg by mouth daily. Yes Provider, Historical   flunisolide (NASAREL) 25 mcg (0.025 %) spry 2 Sprays two (2) times a day. Yes Provider, Historical   albuterol (ProAir HFA) 90 mcg/actuation inhaler Take  by inhalation. Yes Provider, Historical   colchicine 0.6 mg tablet Take 0.6 mg by mouth daily. Yes Provider, Historical   gabapentin (NEURONTIN) 300 mg capsule Take 300 mg by mouth four (4) times daily. Yes Provider, Historical   oxyCODONE IR (ROXICODONE) 5 mg immediate release tablet Take 5 mg by mouth every twelve (12) hours. Yes Provider, Historical   metFORMIN (GLUCOPHAGE) 500 mg tablet Take 1,000 mg by mouth two (2) times daily (with meals). Yes Provider, Historical   insulin lispro (HumaLOG U-100 Insulin) 100 unit/mL injection by SubCUTAneous route.  Sliding scale   Yes Provider, Historical   ferrous sulfate (Iron) 325 mg (65 mg iron) tablet Take  by mouth Daily (before breakfast). Provider, Historical   hydroCHLOROthiazide (HYDRODIURIL) 25 mg tablet Take 25 mg by mouth daily. Provider, Historical   insulin glargine (LANTUS) 100 unit/mL injection by SubCUTAneous route nightly.  80units am/ 20units pm  Patient not taking: Reported on 6/2/2021    Provider, Historical         Current Facility-Administered Medications:     piperacillin-tazobactam (ZOSYN) 3.375 g in 0.9% sodium chloride (MBP/ADV) 100 mL MBP, 3.375 g, IntraVENous, Q8H, Moni Patel MD, Last Rate: 25 mL/hr at 06/02/21 0611, 3.375 g at 06/02/21 0679    glucose chewable tablet 16 g, 4 Tablet, Oral, PRN, Moni Patel MD    dextrose (D50W) injection syrg 12.5-25 g, 25-50 mL, IntraVENous, PRN, Raman Patel MD    glucagon (GLUCAGEN) injection 1 mg, 1 mg, IntraMUSCular, PRN, Raman Patel MD    insulin lispro (HUMALOG) injection, , SubCUTAneous, AC&HS, Raman Patel MD    oxyCODONE IR (ROXICODONE) tablet 5 mg, 5 mg, Oral, Q12H PRN, Moni Patel MD, 5 mg at 06/02/21 0411    vancomycin (VANCOCIN) 1,500 mg in 0.9% sodium chloride 500 mL IVPB, 1,500 mg, IntraVENous, Q18H, Raman Patel MD    Vancomycin Pharmacy Dosing, , Other, Rx Dosing/Monitoring, Almaz Velasco MD    [START ON 6/4/2021] Vancomycin Trough Level scheduled for 6-4-21 at 1600, , Other, ONCE, Almaz Velasco MD    Facility-Administered Medications Ordered in Other Encounters:     lidocaine (XYLOCAINE) 2 % viscous solution 15 mL, 15 mL, Topical, PRN, Bryan Malone MD    lidocaine (XYLOCAINE) 2 % viscous solution 15 mL, 15 mL, Topical, PRN, Bryan Malone MD    LAB DATA REVIEWED:    Recent Results (from the past 24 hour(s))   EKG, 12 LEAD, INITIAL    Collection Time: 06/01/21  6:56 PM   Result Value Ref Range    Ventricular Rate 104 BPM    Atrial Rate 104 BPM    P-R Interval 116 ms    QRS Duration 72 ms    Q-T Interval 300 ms    QTC Calculation (Bezet) 394 ms    Calculated P Axis 93 degrees    Calculated R Axis 180 degrees    Calculated T Axis 89 degrees    Diagnosis       Electronic ventricular pacemaker  Confirmed by Shyla Nicholson ([id number]) on 6/2/2021 6:57:44 AM     CBC WITH AUTOMATED DIFF    Collection Time: 06/01/21  8:20 PM   Result Value Ref Range    WBC 10.3 4.1 - 11.1 K/uL    RBC 4.41 4.10 - 5.70 M/uL    HGB 12.3 12.1 - 17.0 g/dL    HCT 39.9 36.6 - 50.3 %    MCV 90.5 80.0 - 99.0 FL    MCH 27.9 26.0 - 34.0 PG    MCHC 30.8 30.0 - 36.5 g/dL    RDW 15.0 (H) 11.5 - 14.5 %    PLATELET 927 501 - 215 K/uL    MPV 10.7 8.9 - 12.9 FL    NRBC 0.0 0.0  WBC    ABSOLUTE NRBC 0.00 0.00 - 0.01 K/uL    NEUTROPHILS 75 32 - 75 %    LYMPHOCYTES 15 12 - 49 %    MONOCYTES 9 5 - 13 %    EOSINOPHILS 1 0 - 7 %    BASOPHILS 0 0 - 1 %    IMMATURE GRANULOCYTES 0 0 - 0.5 %    ABS. NEUTROPHILS 7.7 1.8 - 8.0 K/UL    ABS. LYMPHOCYTES 1.5 0.8 - 3.5 K/UL    ABS. MONOCYTES 0.9 0.0 - 1.0 K/UL    ABS. EOSINOPHILS 0.1 0.0 - 0.4 K/UL    ABS. BASOPHILS 0.0 0.0 - 0.1 K/UL    ABS. IMM.  GRANS. 0.0 0.00 - 0.04 K/UL    DF AUTOMATED     METABOLIC PANEL, BASIC    Collection Time: 06/01/21  8:20 PM   Result Value Ref Range    Sodium 138 136 - 145 mmol/L    Potassium 3.8 3.5 - 5.1 mmol/L    Chloride 101 97 - 108 mmol/L    CO2 27 21 - 32 mmol/L    Anion gap 10 5 - 15 mmol/L    Glucose 169 (H) 65 - 100 mg/dL    BUN 34 (H) 6 - 20 mg/dL    Creatinine 1.70 (H) 0.70 - 1.30 mg/dL    BUN/Creatinine ratio 20 12 - 20      GFR est AA 49 (L) >60 ml/min/1.73m2    GFR est non-AA 41 (L) >60 ml/min/1.73m2    Calcium 9.4 8.5 - 10.1 mg/dL   TROPONIN I    Collection Time: 06/01/21  8:20 PM   Result Value Ref Range    Troponin-I, Qt. <0.05 <0.05 ng/mL   BNP    Collection Time: 06/01/21  8:20 PM   Result Value Ref Range    NT pro-BNP 1,545 (H) <125 pg/mL   LACTIC ACID    Collection Time: 06/01/21  8:20 PM   Result Value Ref Range    Lactic acid 2.8 (HH) 0.4 - 2.0 mmol/L       XR Results (most recent):  Results from Hospital Encounter encounter on 06/01/21    XR CHEST PORT    Narrative  Portable chest, 1949 hours. Comparison with 6/22/2020. Impression  Stable mild-to-moderate enlargement of cardiomediastinal silhouette. Mediastinum underpenetrated with suboptimal visualization of right atrial and  right ventricular pacing leads. There is obscuration of a portion of the left  hemithorax by the generator device. No pneumothorax. No evidence of pulmonary  edema, air space pneumonia, or pleural effusion. XR CHEST PORT   Final Result   Stable mild-to-moderate enlargement of cardiomediastinal silhouette. Mediastinum underpenetrated with suboptimal visualization of right atrial and   right ventricular pacing leads. There is obscuration of a portion of the left   hemithorax by the generator device. No pneumothorax. No evidence of pulmonary   edema, air space pneumonia, or pleural effusion. Review of Systems:  Constitutional: Negative for chills and fever. HENT: Negative. Eyes: Negative. Respiratory: SOB with exertion  Cardiovascular: Negative. Gastrointestinal: Negative for abdominal pain and nausea. Skin: BL venous ulcers   Neurological: Negative. Objective:   VITALS:    Visit Vitals  /83 (BP 1 Location: Right lower arm, BP Patient Position: Sitting)   Pulse 96   Temp 98.1 °F (36.7 °C)   Resp 18   Ht 5' 11\" (1.803 m)   Wt (!) 165.6 kg (365 lb)   SpO2 99%   BMI 50.91 kg/m²       Physical Exam:   Constitutional: pt is oriented to person, place, and time. HENT:   Head: Normocephalic and atraumatic. Eyes: Pupils are equal, round, and reactive to light. EOM are normal.   Cardiovascular: Normal rate, regular rhythm and normal heart sounds. Pulmonary/Chest: Breath sounds normal. No wheezes. No rales. Exhibits no tenderness. Abdominal: Soft. Bowel sounds are normal. There is no abdominal tenderness.  There is no rebound and no guarding. Musculoskeletal: Normal range of motion. Neurological: pt is alert and oriented to person, place, and time. Alert. Normal strength. No cranial nerve deficit or sensory deficit. Displays a negative Romberg sign. Extremities:  Venous ulcers R and L lateral lower legs with surrounding discoloration of lower extremities      ASSESSMENT & PLAN:  Venous ulcers  SOB  Elevated BNP  COPD  Diabetes   Hypertension  Gout  Arthritis   Sleep Apnea     Consult wound care. Consult ID for cellulitis. Continue Vancomycin and Zosyn. Consult cardiology for elevated BNP. Continue blood glucose monitoring. Vancomycin trough level scheduled for 6/4. Sliding scale insulin. Oxycodone IR for pain management.    Recheck labs in AM.               ________________________________________________________________________    Signed: Mickey Crowley

## 2021-06-03 ENCOUNTER — APPOINTMENT (OUTPATIENT)
Dept: NON INVASIVE DIAGNOSTICS | Age: 66
DRG: 299 | End: 2021-06-03
Attending: INTERNAL MEDICINE
Payer: MEDICARE

## 2021-06-03 ENCOUNTER — APPOINTMENT (OUTPATIENT)
Dept: NON INVASIVE DIAGNOSTICS | Age: 66
DRG: 299 | End: 2021-06-03
Attending: SURGERY
Payer: MEDICARE

## 2021-06-03 LAB
ALBUMIN SERPL-MCNC: 2.8 G/DL (ref 3.5–5)
ALBUMIN/GLOB SERPL: 0.5 {RATIO} (ref 1.1–2.2)
ALP SERPL-CCNC: 65 U/L (ref 45–117)
ALT SERPL-CCNC: 30 U/L (ref 12–78)
ANION GAP SERPL CALC-SCNC: 7 MMOL/L (ref 5–15)
AST SERPL W P-5'-P-CCNC: 25 U/L (ref 15–37)
BASOPHILS # BLD: 0 K/UL (ref 0–0.1)
BASOPHILS NFR BLD: 0 % (ref 0–1)
BILIRUB SERPL-MCNC: 0.6 MG/DL (ref 0.2–1)
BUN SERPL-MCNC: 27 MG/DL (ref 6–20)
BUN/CREAT SERPL: 20 (ref 12–20)
CA-I BLD-MCNC: 9.1 MG/DL (ref 8.5–10.1)
CHLORIDE SERPL-SCNC: 106 MMOL/L (ref 97–108)
CO2 SERPL-SCNC: 24 MMOL/L (ref 21–32)
CREAT SERPL-MCNC: 1.34 MG/DL (ref 0.7–1.3)
DIFFERENTIAL METHOD BLD: ABNORMAL
EOSINOPHIL # BLD: 0.3 K/UL (ref 0–0.4)
EOSINOPHIL NFR BLD: 4 % (ref 0–7)
ERYTHROCYTE [DISTWIDTH] IN BLOOD BY AUTOMATED COUNT: 14.7 % (ref 11.5–14.5)
GLOBULIN SER CALC-MCNC: 5.1 G/DL (ref 2–4)
GLUCOSE BLD STRIP.AUTO-MCNC: 127 MG/DL (ref 65–117)
GLUCOSE BLD STRIP.AUTO-MCNC: 136 MG/DL (ref 65–117)
GLUCOSE BLD STRIP.AUTO-MCNC: 147 MG/DL (ref 65–117)
GLUCOSE BLD STRIP.AUTO-MCNC: 92 MG/DL (ref 65–117)
GLUCOSE SERPL-MCNC: 94 MG/DL (ref 65–100)
HCT VFR BLD AUTO: 35.2 % (ref 36.6–50.3)
HGB BLD-MCNC: 10.7 G/DL (ref 12.1–17)
IMM GRANULOCYTES # BLD AUTO: 0 K/UL (ref 0–0.04)
IMM GRANULOCYTES NFR BLD AUTO: 0 % (ref 0–0.5)
LYMPHOCYTES # BLD: 1.6 K/UL (ref 0.8–3.5)
LYMPHOCYTES NFR BLD: 23 % (ref 12–49)
MCH RBC QN AUTO: 27.9 PG (ref 26–34)
MCHC RBC AUTO-ENTMCNC: 30.4 G/DL (ref 30–36.5)
MCV RBC AUTO: 91.9 FL (ref 80–99)
MONOCYTES # BLD: 0.6 K/UL (ref 0–1)
MONOCYTES NFR BLD: 9 % (ref 5–13)
NEUTS SEG # BLD: 4.4 K/UL (ref 1.8–8)
NEUTS SEG NFR BLD: 64 % (ref 32–75)
NRBC # BLD: 0 K/UL (ref 0–0.01)
NRBC BLD-RTO: 0 PER 100 WBC
PERFORMED BY, TECHID: ABNORMAL
PERFORMED BY, TECHID: NORMAL
PLATELET # BLD AUTO: 263 K/UL (ref 150–400)
PMV BLD AUTO: 11.1 FL (ref 8.9–12.9)
POTASSIUM SERPL-SCNC: 3.8 MMOL/L (ref 3.5–5.1)
PROT SERPL-MCNC: 7.9 G/DL (ref 6.4–8.2)
RBC # BLD AUTO: 3.83 M/UL (ref 4.1–5.7)
SODIUM SERPL-SCNC: 137 MMOL/L (ref 136–145)
WBC # BLD AUTO: 7 K/UL (ref 4.1–11.1)

## 2021-06-03 PROCEDURE — 65270000029 HC RM PRIVATE

## 2021-06-03 PROCEDURE — 77010033678 HC OXYGEN DAILY

## 2021-06-03 PROCEDURE — 74011250637 HC RX REV CODE- 250/637: Performed by: INTERNAL MEDICINE

## 2021-06-03 PROCEDURE — 99222 1ST HOSP IP/OBS MODERATE 55: CPT | Performed by: SURGERY

## 2021-06-03 PROCEDURE — 74011000250 HC RX REV CODE- 250: Performed by: INTERNAL MEDICINE

## 2021-06-03 PROCEDURE — 82962 GLUCOSE BLOOD TEST: CPT

## 2021-06-03 PROCEDURE — 85025 COMPLETE CBC W/AUTO DIFF WBC: CPT

## 2021-06-03 PROCEDURE — 74011250636 HC RX REV CODE- 250/636: Performed by: FAMILY MEDICINE

## 2021-06-03 PROCEDURE — 74011250637 HC RX REV CODE- 250/637: Performed by: FAMILY MEDICINE

## 2021-06-03 PROCEDURE — 74011636637 HC RX REV CODE- 636/637: Performed by: FAMILY MEDICINE

## 2021-06-03 PROCEDURE — 99232 SBSQ HOSP IP/OBS MODERATE 35: CPT | Performed by: INTERNAL MEDICINE

## 2021-06-03 PROCEDURE — 74011636637 HC RX REV CODE- 636/637: Performed by: INTERNAL MEDICINE

## 2021-06-03 PROCEDURE — 94640 AIRWAY INHALATION TREATMENT: CPT

## 2021-06-03 PROCEDURE — 94760 N-INVAS EAR/PLS OXIMETRY 1: CPT

## 2021-06-03 PROCEDURE — 36415 COLL VENOUS BLD VENIPUNCTURE: CPT

## 2021-06-03 PROCEDURE — 93970 EXTREMITY STUDY: CPT

## 2021-06-03 PROCEDURE — 74011000258 HC RX REV CODE- 258: Performed by: FAMILY MEDICINE

## 2021-06-03 PROCEDURE — 80053 COMPREHEN METABOLIC PANEL: CPT

## 2021-06-03 PROCEDURE — 74011250637 HC RX REV CODE- 250/637

## 2021-06-03 RX ORDER — INSULIN GLARGINE 100 [IU]/ML
32 INJECTION, SOLUTION SUBCUTANEOUS
Status: DISCONTINUED | OUTPATIENT
Start: 2021-06-03 | End: 2021-06-10 | Stop reason: HOSPADM

## 2021-06-03 RX ORDER — POTASSIUM CHLORIDE 20 MEQ/1
40 TABLET, EXTENDED RELEASE ORAL 2 TIMES DAILY
Status: COMPLETED | OUTPATIENT
Start: 2021-06-03 | End: 2021-06-05

## 2021-06-03 RX ORDER — NYSTATIN 100000 [USP'U]/G
POWDER TOPICAL 2 TIMES DAILY
Status: DISCONTINUED | OUTPATIENT
Start: 2021-06-03 | End: 2021-06-07

## 2021-06-03 RX ORDER — SAME BUTANEDISULFONATE/BETAINE 400-600 MG
250 POWDER IN PACKET (EA) ORAL 2 TIMES DAILY
Status: DISCONTINUED | OUTPATIENT
Start: 2021-06-03 | End: 2021-06-10 | Stop reason: HOSPADM

## 2021-06-03 RX ORDER — INSULIN GLARGINE 100 [IU]/ML
80 INJECTION, SOLUTION SUBCUTANEOUS DAILY
Status: DISCONTINUED | OUTPATIENT
Start: 2021-06-04 | End: 2021-06-10 | Stop reason: HOSPADM

## 2021-06-03 RX ORDER — COLCHICINE 0.6 MG/1
0.6 TABLET ORAL DAILY
Status: DISCONTINUED | OUTPATIENT
Start: 2021-06-04 | End: 2021-06-10 | Stop reason: HOSPADM

## 2021-06-03 RX ADMIN — TAMSULOSIN HYDROCHLORIDE 0.4 MG: 0.4 CAPSULE ORAL at 09:53

## 2021-06-03 RX ADMIN — MULTIVITAMIN TABLET 1 TABLET: TABLET at 09:53

## 2021-06-03 RX ADMIN — ASPIRIN 81 MG: 81 TABLET, CHEWABLE ORAL at 09:53

## 2021-06-03 RX ADMIN — GABAPENTIN 300 MG: 300 CAPSULE ORAL at 09:53

## 2021-06-03 RX ADMIN — OXYCODONE HYDROCHLORIDE AND ACETAMINOPHEN 1000 MG: 500 TABLET ORAL at 09:53

## 2021-06-03 RX ADMIN — NYSTATIN: 100000 POWDER TOPICAL at 21:37

## 2021-06-03 RX ADMIN — Medication 250 MG: at 21:35

## 2021-06-03 RX ADMIN — VITAMIN E CAP 100 UNIT 800 UNITS: 100 CAP at 10:31

## 2021-06-03 RX ADMIN — METFORMIN HYDROCHLORIDE 1000 MG: 500 TABLET ORAL at 09:53

## 2021-06-03 RX ADMIN — METFORMIN HYDROCHLORIDE 1000 MG: 500 TABLET ORAL at 17:04

## 2021-06-03 RX ADMIN — BUMETANIDE 1 MG: 0.25 INJECTION INTRAMUSCULAR; INTRAVENOUS at 21:36

## 2021-06-03 RX ADMIN — GABAPENTIN 300 MG: 300 CAPSULE ORAL at 12:59

## 2021-06-03 RX ADMIN — OXYCODONE HYDROCHLORIDE AND ACETAMINOPHEN 1000 MG: 500 TABLET ORAL at 21:35

## 2021-06-03 RX ADMIN — OXYCODONE HYDROCHLORIDE 10 MG: 10 TABLET ORAL at 05:55

## 2021-06-03 RX ADMIN — OXYCODONE HYDROCHLORIDE 10 MG: 10 TABLET ORAL at 21:36

## 2021-06-03 RX ADMIN — FERROUS SULFATE TAB 325 MG (65 MG ELEMENTAL FE) 325 MG: 325 (65 FE) TAB at 09:53

## 2021-06-03 RX ADMIN — GABAPENTIN 300 MG: 300 CAPSULE ORAL at 21:36

## 2021-06-03 RX ADMIN — OXYCODONE HYDROCHLORIDE 10 MG: 10 TABLET ORAL at 12:59

## 2021-06-03 RX ADMIN — GABAPENTIN 300 MG: 300 CAPSULE ORAL at 17:04

## 2021-06-03 RX ADMIN — POTASSIUM CHLORIDE 40 MEQ: 1500 TABLET, EXTENDED RELEASE ORAL at 21:36

## 2021-06-03 RX ADMIN — INSULIN GLARGINE 32 UNITS: 100 INJECTION, SOLUTION SUBCUTANEOUS at 21:45

## 2021-06-03 RX ADMIN — VANCOMYCIN HYDROCHLORIDE 1500 MG: 10 INJECTION, POWDER, LYOPHILIZED, FOR SOLUTION INTRAVENOUS at 05:56

## 2021-06-03 RX ADMIN — INSULIN LISPRO 3 UNITS: 100 INJECTION, SOLUTION INTRAVENOUS; SUBCUTANEOUS at 10:00

## 2021-06-03 RX ADMIN — OXYCODONE HYDROCHLORIDE AND ACETAMINOPHEN 1000 MG: 500 TABLET ORAL at 02:32

## 2021-06-03 RX ADMIN — VITAMIN E CAP 100 UNIT 800 UNITS: 100 CAP at 21:34

## 2021-06-03 RX ADMIN — PIPERACILLIN AND TAZOBACTAM 3.38 G: 3; .375 INJECTION, POWDER, LYOPHILIZED, FOR SOLUTION INTRAVENOUS at 17:04

## 2021-06-03 RX ADMIN — PIPERACILLIN AND TAZOBACTAM 3.38 G: 3; .375 INJECTION, POWDER, LYOPHILIZED, FOR SOLUTION INTRAVENOUS at 02:32

## 2021-06-03 RX ADMIN — BUMETANIDE 1 MG: 0.25 INJECTION INTRAMUSCULAR; INTRAVENOUS at 09:54

## 2021-06-03 NOTE — PROGRESS NOTES
General Daily Progress Note          Patient Name:   Juan Luis Camejo       YOB: 1955       Age:  76 y.o. Admit Date: 6/1/2021      Subjective:     Patient is alert, awake, and in no acute distress sitting up in bed eating. His pain is \"no longer off the charts\" and denies SOB and since yesterday. He also denies chest pain, abdominal pain, and N/V. He is complaining of genital itching. He saw ID, cardiology, and pulmonology yesterday.             Objective:     Visit Vitals  /77 (BP 1 Location: Left lower arm, BP Patient Position: At rest)   Pulse 79   Temp 97.9 °F (36.6 °C)   Resp 22   Ht 5' 11\" (1.803 m)   Wt (!) 165.6 kg (365 lb)   SpO2 95%   BMI 50.91 kg/m²        Recent Results (from the past 24 hour(s))   GLUCOSE, POC    Collection Time: 06/02/21 10:06 AM   Result Value Ref Range    Glucose (POC) 186 (H) 65 - 117 mg/dL    Performed by Norma Cunningham(PCT)    HEMOGLOBIN A1C WITH EAG    Collection Time: 06/02/21 12:31 PM   Result Value Ref Range    Hemoglobin A1c 6.0 (H) 4.0 - 5.6 %    Est. average glucose 126 mg/dL   GLUCOSE, POC    Collection Time: 06/02/21  5:25 PM   Result Value Ref Range    Glucose (POC) 160 (H) 65 - 117 mg/dL    Performed by Norma Cunningham(PCT)    GLUCOSE, POC    Collection Time: 06/02/21  8:48 PM   Result Value Ref Range    Glucose (POC) 152 (H) 65 - 117 mg/dL    Performed by [facility]    METABOLIC PANEL, BASIC    Collection Time: 06/02/21  9:48 PM   Result Value Ref Range    Sodium 138 136 - 145 mmol/L    Potassium 3.9 3.5 - 5.1 mmol/L    Chloride 106 97 - 108 mmol/L    CO2 25 21 - 32 mmol/L    Anion gap 7 5 - 15 mmol/L    Glucose 132 (H) 65 - 100 mg/dL    BUN 30 (H) 6 - 20 mg/dL    Creatinine 1.45 (H) 0.70 - 1.30 mg/dL    BUN/Creatinine ratio 21 (H) 12 - 20      GFR est AA 59 (L) >60 ml/min/1.73m2    GFR est non-AA 49 (L) >60 ml/min/1.73m2    Calcium 8.9 8.5 - 10.1 mg/dL   GLUCOSE, POC    Collection Time: 06/02/21  9:48 PM   Result Value Ref Range Glucose (POC) 140 (H) 65 - 117 mg/dL    Performed by Coco LIANG [place])    METABOLIC PANEL, COMPREHENSIVE    Collection Time: 06/03/21  6:16 AM   Result Value Ref Range    Sodium 137 136 - 145 mmol/L    Potassium 3.8 3.5 - 5.1 mmol/L    Chloride 106 97 - 108 mmol/L    CO2 24 21 - 32 mmol/L    Anion gap 7 5 - 15 mmol/L    Glucose 94 65 - 100 mg/dL    BUN 27 (H) 6 - 20 mg/dL    Creatinine 1.34 (H) 0.70 - 1.30 mg/dL    BUN/Creatinine ratio 20 12 - 20      GFR est AA >60 >60 ml/min/1.73m2    GFR est non-AA 53 (L) >60 ml/min/1.73m2    Calcium 9.1 8.5 - 10.1 mg/dL    Bilirubin, total 0.6 0.2 - 1.0 mg/dL    AST (SGOT) 25 15 - 37 U/L    ALT (SGPT) 30 12 - 78 U/L    Alk. phosphatase 65 45 - 117 U/L    Protein, total 7.9 6.4 - 8.2 g/dL    Albumin 2.8 (L) 3.5 - 5.0 g/dL    Globulin 5.1 (H) 2.0 - 4.0 g/dL    A-G Ratio 0.5 (L) 1.1 - 2.2       [unfilled]      Review of Systems    Constitutional: Negative for chills and fever. HENT: Negative. Eyes: Negative. Respiratory: Negative. Cardiovascular: Negative. Gastrointestinal: Negative for abdominal pain and nausea. Skin: Negative. Neurological: Negative. Physical Exam:      Constitutional: pt is oriented to person, place, and time. HENT:   Head: Normocephalic and atraumatic. Eyes: Pupils are equal, round, and reactive to light. EOM are normal.   Cardiovascular: Normal rate, regular rhythm and normal heart sounds. Pulmonary/Chest: Breath sounds normal. No wheezes. No rales. Exhibits no tenderness. Abdominal: Soft. Bowel sounds are normal. There is no abdominal tenderness. There is no rebound and no guarding. Musculoskeletal: Normal range of motion. Neurological: pt is alert and oriented to person, place, and time.   Extremities: Venous ulcers R and L lateral lower legs with surrounding discoloration of lower extremities, currently covered with wound dressings    XR CHEST PORT   Final Result   Stable mild-to-moderate enlargement of cardiomediastinal silhouette. Mediastinum underpenetrated with suboptimal visualization of right atrial and   right ventricular pacing leads. There is obscuration of a portion of the left   hemithorax by the generator device. No pneumothorax. No evidence of pulmonary   edema, air space pneumonia, or pleural effusion.            Recent Results (from the past 24 hour(s))   GLUCOSE, POC    Collection Time: 06/02/21 10:06 AM   Result Value Ref Range    Glucose (POC) 186 (H) 65 - 117 mg/dL    Performed by Gloria Cunningham(PCT)    HEMOGLOBIN A1C WITH EAG    Collection Time: 06/02/21 12:31 PM   Result Value Ref Range    Hemoglobin A1c 6.0 (H) 4.0 - 5.6 %    Est. average glucose 126 mg/dL   GLUCOSE, POC    Collection Time: 06/02/21  5:25 PM   Result Value Ref Range    Glucose (POC) 160 (H) 65 - 117 mg/dL    Performed by Gloria Cunningham(PCT)    GLUCOSE, POC    Collection Time: 06/02/21  8:48 PM   Result Value Ref Range    Glucose (POC) 152 (H) 65 - 117 mg/dL    Performed by Remberto Lilliana    METABOLIC PANEL, BASIC    Collection Time: 06/02/21  9:48 PM   Result Value Ref Range    Sodium 138 136 - 145 mmol/L    Potassium 3.9 3.5 - 5.1 mmol/L    Chloride 106 97 - 108 mmol/L    CO2 25 21 - 32 mmol/L    Anion gap 7 5 - 15 mmol/L    Glucose 132 (H) 65 - 100 mg/dL    BUN 30 (H) 6 - 20 mg/dL    Creatinine 1.45 (H) 0.70 - 1.30 mg/dL    BUN/Creatinine ratio 21 (H) 12 - 20      GFR est AA 59 (L) >60 ml/min/1.73m2    GFR est non-AA 49 (L) >60 ml/min/1.73m2    Calcium 8.9 8.5 - 10.1 mg/dL   GLUCOSE, POC    Collection Time: 06/02/21  9:48 PM   Result Value Ref Range    Glucose (POC) 140 (H) 65 - 117 mg/dL    Performed by Nolia Crigler ([place])    METABOLIC PANEL, COMPREHENSIVE    Collection Time: 06/03/21  6:16 AM   Result Value Ref Range    Sodium 137 136 - 145 mmol/L    Potassium 3.8 3.5 - 5.1 mmol/L    Chloride 106 97 - 108 mmol/L    CO2 24 21 - 32 mmol/L    Anion gap 7 5 - 15 mmol/L    Glucose 94 65 - 100 mg/dL    BUN 27 (H) 6 - 20 mg/dL    Creatinine 1.34 (H) 0.70 - 1.30 mg/dL    BUN/Creatinine ratio 20 12 - 20      GFR est AA >60 >60 ml/min/1.73m2    GFR est non-AA 53 (L) >60 ml/min/1.73m2    Calcium 9.1 8.5 - 10.1 mg/dL    Bilirubin, total 0.6 0.2 - 1.0 mg/dL    AST (SGOT) 25 15 - 37 U/L    ALT (SGPT) 30 12 - 78 U/L    Alk. phosphatase 65 45 - 117 U/L    Protein, total 7.9 6.4 - 8.2 g/dL    Albumin 2.8 (L) 3.5 - 5.0 g/dL    Globulin 5.1 (H) 2.0 - 4.0 g/dL    A-G Ratio 0.5 (L) 1.1 - 2.2         Results     Procedure Component Value Units Date/Time    CULTURE, BLOOD #1 [[id number]] Collected: 06/02/21 1231    Order Status: Completed Specimen: Blood Updated: 06/02/21 1248    CULTURE, BLOOD #2 [[id number]] Collected: 06/02/21 1231    Order Status: Completed Specimen: Blood Updated: 06/02/21 1249    CULTURE, BLOOD, PAIRED [[id number]] Collected: 06/01/21 2015    Order Status: Completed Specimen: Blood Updated: 06/02/21 0859     Special Requests: No Special Requests        Culture result: No growth after 1 hour              Labs:     Recent Labs     06/01/21 2020   WBC 10.3   HGB 12.3   HCT 39.9        Recent Labs     06/03/21 0616 06/02/21 2148 06/01/21 2020    138 138   K 3.8 3.9 3.8    106 101   CO2 24 25 27   BUN 27* 30* 34*   CREA 1.34* 1.45* 1.70*   GLU 94 132* 169*   CA 9.1 8.9 9.4     Recent Labs     06/03/21 0616   ALT 30   AP 65   TBILI 0.6   TP 7.9   ALB 2.8*   GLOB 5.1*     No results for input(s): INR, PTP, APTT, INREXT in the last 72 hours. No results for input(s): FE, TIBC, PSAT, FERR in the last 72 hours. No results found for: FOL, RBCF   No results for input(s): PH, PCO2, PO2 in the last 72 hours.   Recent Labs     06/01/21 2020   TROIQ <0.05     No results found for: CHOL, CHOLX, CHLST, CHOLV, HDL, HDLP, LDL, LDLC, DLDLP, TGLX, TRIGL, TRIGP, CHHD, CHHDX  Lab Results   Component Value Date/Time    Glucose (POC) 140 (H) 06/02/2021 09:48 PM    Glucose (POC) 152 (H) 06/02/2021 08:48 PM    Glucose (POC) 160 (H) 06/02/2021 05:25 PM    Glucose (POC) 186 (H) 06/02/2021 10:06 AM     No results found for: COLOR, APPRN, SPGRU, REFSG, KENTON, PROTU, GLUCU, KETU, BILU, UROU, DELMY, LEUKU, GLUKE, EPSU, BACTU, WBCU, RBCU, CASTS, UCRY      Assessment:     Acute cellulitis of both legs  Venous stasis ulcer  Chronic hypoxemic respiratory failure  History of CHF diastolic dysfunction  COPD  Type 2 diabetes  Hypertension  Gout  Arthritis   Sleep Apnea   Morbid obesity         Plan:     Per cardiology:   Continue IV diuresis  Stop HCTZ and switch to Bumex IV  Continue aspirin  Monitor kidney function  Stop colchicine due to abnormal kidney function. Daily weights and ins and outs     Per ID:   Continue vancomycin and zosyn pending repeat wound cultures. Restart home dose of Gabapentin. Leg elevation to help with venous return. Routine labs in morning. Per pulmonology:     1. Trilogy for non invasive ventilatory life support to prevent worsening respiratory acidosis  2. Patient is not actively wheezing no need for any systemic steroids  3. Continue with Bumex  4. Patient is on vancomycin and Zosyn  5. We will get blood levels in a.m.  6. Supplemental O2 to keep sats > 93%  7. Aspiration precautions  8. Labs to follow electrolytes, renal function and and blood counts  9. Glucose monitoring and SSI  10. Bronchial hygiene with respiratory therapy techniques, bronchodilators  11.  DVT, SUP prophylaxis     Start probiotics, per patient request.   Use nystatin for yeast infection.             Current Facility-Administered Medications:     piperacillin-tazobactam (ZOSYN) 3.375 g in 0.9% sodium chloride (MBP/ADV) 100 mL MBP, 3.375 g, IntraVENous, Q8H, Moni Patel MD, Last Rate: 25 mL/hr at 06/03/21 0232, 3.375 g at 06/03/21 0232    glucose chewable tablet 16 g, 4 Tablet, Oral, PRN, Moni Patel MD    dextrose (D50W) injection syrg 12.5-25 g, 25-50 mL, IntraVENous, PRN, Raman Patel MD    glucagon [facility]) injection 1 mg, 1 mg, IntraMUSCular, PRN, Cassie Patel MD    vancomycin (VANCOCIN) 1,500 mg in 0.9% sodium chloride 500 mL IVPB, 1,500 mg, IntraVENous, Q18H, Moni Patel MD, Last Rate: 250 mL/hr at 06/03/21 0556, 1,500 mg at 06/03/21 0556    Vancomycin Pharmacy Dosing, , Other, Rx Dosing/Monitoring, Elba Alexander MD    [START ON 6/4/2021] Vancomycin Trough Level scheduled for 6-4-21 at 1600, , Other, ONCE, Moni Patel MD    albuterol (PROVENTIL HFA, VENTOLIN HFA, PROAIR HFA) inhaler 2 Puff, 2 Puff, Inhalation, Q6H PRN, Cassie Patel MD    ascorbic acid (vitamin C) (VITAMIN C) tablet 1,000 mg, 1,000 mg, Oral, BID, Moni Patel MD, 1,000 mg at 06/03/21 7866    aspirin chewable tablet 81 mg, 81 mg, Oral, DAILY, Moni Patel MD    ferrous sulfate tablet 325 mg, 1 Tablet, Oral, ACB, Elba Alexander MD  [facility]  . PHARMACY TO SUBSTITUTE PER PROTOCOL (Reordered from: flunisolide (NASAREL) 25 mcg (0.025 %) spry), , , Per Protocol, Cassie Patel MD    gabapentin (NEURONTIN) capsule 300 mg, 300 mg, Oral, QID, Moni Patel MD, 300 mg at 06/02/21 2154    insulin glargine (LANTUS) injection 20 Units, 20 Units, SubCUTAneous, QHS, Moni Patel MD, 20 Units at 06/02/21 2154    metFORMIN (GLUCOPHAGE) tablet 1,000 mg, 1,000 mg, Oral, BID WITH MEALS, Moni Patel MD, 1,000 mg at 06/02/21 1830    multivitamin (ONE A DAY) tablet 1 Tablet, 1 Tablet, Oral, DAILY, Moni Patel MD    [facility]) capsule 0.4 mg, 0.4 mg, Oral, DAILY, Moni Patel MD    tiotropium bromide (SPIRIVA RESPIMAT) 2.5 mcg /actuation, 5 mcg, Inhalation, DAILY, Moni Patel MD, 2 Puff at 06/02/21 1300    insulin lispro (HUMALOG) injection, , SubCUTAneous, AC&HS, Moni Patel MD, 3 Units at 06/02/21 1831    acetaminophen (TYLENOL) tablet 650 mg, 650 mg, Oral, Q6H PRN **OR** acetaminophen (TYLENOL) suppository 650 mg, 650 mg, Rectal, Q6H PRN, Cassie Patel MD    polyethylene glycol (MIRALAX) packet 17 g, 17 g, Oral, DAILY PRN, Aimee Patel MD    promethazine (PHENERGAN) tablet 12.5 mg, 12.5 mg, Oral, Q6H PRN **OR** ondansetron (ZOFRAN) injection 4 mg, 4 mg, IntraVENous, Q6H PRN, Moni Patel MD    oxyCODONE IR (ROXICODONE) tablet 10 mg, 10 mg, Oral, Q6H PRN, Moni Patel MD, 10 mg at 06/03/21 0565    vitamin e (E GEMS) capsule 800 Units, 800 Units, Oral, BID, Moni Patel MD, 800 Units at 06/02/21 1605    bumetanide (BUMEX) injection 1 mg, 1 mg, IntraVENous, BID, Kari Spann MD, 1 mg at 06/02/21 6643

## 2021-06-03 NOTE — PROGRESS NOTES
Progress Note    Patient: Elizabeth Fish MRN: [id number]  SSN: [id number]    YOB: 1955  Age: 76 y.o. Sex: male      Admit Date: 6/1/2021    LOS: 1 day     Subjective:     No acute events overnight    Objective:     Vitals:    06/02/21 2141 06/03/21 0000 06/03/21 0848 06/03/21 0908   BP: 121/77  117/76    Pulse: 79 79 88    Resp: 22  12    Temp: 97.9 °F (36.6 °C)  99 °F (37.2 °C)    SpO2: 95%  98% 99%   Weight:    (!) 171 kg (377 lb)   Height:            Intake and Output:  Current Shift: 06/03 0701 - 06/03 1900  In: -   Out: 880 [Urine:880]  Last three shifts: 06/01 1901 - 06/03 0700  In: -   Out: 1985 [Urine:1985]    Physical Exam:   General:  Alert, cooperative, no distress, appears stated age. Eyes:  Conjunctivae/corneas clear. PERRL, EOMs intact. Fundi benign   Ears:  Normal TMs and external ear canals both ears. Nose: Nares normal. Septum midline. Mucosa normal. No drainage or sinus tenderness. Mouth/Throat: Lips, mucosa, and tongue normal. Teeth and gums normal.   Neck: Supple, symmetrical, trachea midline, no adenopathy, thyroid: no enlargment/tenderness/nodules, no carotid bruit and no JVD. Back:   Symmetric, no curvature. ROM normal. No CVA tenderness. Lungs:   Clear to auscultation bilaterally. Heart:  Regular rate and rhythm, S1, S2 normal, no murmur, click, rub or gallop. Abdomen:   Soft, non-tender. Bowel sounds normal. No masses,  No organomegaly. Extremities: Extremities normal, atraumatic, no cyanosis or edema. Pulses: 2+ and symmetric all extremities. Skin: Skin color, texture, turgor normal. No rashes or lesions   Lymph nodes: Cervical, supraclavicular, and axillary nodes normal.   Neurologic: CNII-XII intact. Normal strength, sensation and reflexes throughout. Lab/Data Review: All lab results for the last 24 hours reviewed.          Assessment:     Active Problems:    Cellulitis (6/1/2021)      Cellulitis and abscess of right leg (6/2/2021)    Mr. Jodi Alford is a 79-year-old white male with:  1. Heart failure with decompensation. 2.  Morbid obesity. 3.  Hypertension with hypertensive heart disease. 4.  Mixed hyperlipidemia  5. Diabetes mellitus with neuropathy. 6.  Venous insufficiency, status post ablation( Dr. Ian Juarez)  7. Obstructive sleep apnea, on CPAP machine. 8.  COPD  9. Gout. 10. SSS s/p DCPM 9/2019  11. Off BB due to easy fatigability. Plan:     Continue IV diuresis. Kidney function is getting better with diuresis suggestive of cardiorenal syndrome. Rhythm stable. Currently in paced rhythm. Check echocardiogram.  Appreciate vascular input. Plans noted for venous study.     Signed By: Golden Alexis MD     Staci 3, 2021

## 2021-06-03 NOTE — PROGRESS NOTES
General Daily Progress Note          Patient Name:   Usha Henley       YOB: 1955       Age:  76 y.o. Admit Date: 6/1/2021      Subjective:     Patient is alert, awake, and in no acute distress sitting up in bed eating. His pain is \"no longer off the charts\" and denies SOB and since yesterday. He also denies chest pain, abdominal pain, and N/V. He is complaining of genital itching. He saw ID, cardiology, and pulmonology yesterday.             Objective:     Visit Vitals  /76   Pulse 88   Temp 99 °F (37.2 °C)   Resp 12   Ht 5' 11\" (1.803 m)   Wt (!) 171 kg (377 lb)   SpO2 99%   BMI 52.58 kg/m²        Recent Results (from the past 24 hour(s))   GLUCOSE, POC    Collection Time: 06/02/21  5:25 PM   Result Value Ref Range    Glucose (POC) 160 (H) 65 - 117 mg/dL    Performed by Adriana Cunningham(PCT)    GLUCOSE, POC    Collection Time: 06/02/21  8:48 PM   Result Value Ref Range    Glucose (POC) 152 (H) 65 - 117 mg/dL    Performed by Aimee Romeo    METABOLIC PANEL, BASIC    Collection Time: 06/02/21  9:48 PM   Result Value Ref Range    Sodium 138 136 - 145 mmol/L    Potassium 3.9 3.5 - 5.1 mmol/L    Chloride 106 97 - 108 mmol/L    CO2 25 21 - 32 mmol/L    Anion gap 7 5 - 15 mmol/L    Glucose 132 (H) 65 - 100 mg/dL    BUN 30 (H) 6 - 20 mg/dL    Creatinine 1.45 (H) 0.70 - 1.30 mg/dL    BUN/Creatinine ratio 21 (H) 12 - 20      GFR est AA 59 (L) >60 ml/min/1.73m2    GFR est non-AA 49 (L) >60 ml/min/1.73m2    Calcium 8.9 8.5 - 10.1 mg/dL   GLUCOSE, POC    Collection Time: 06/02/21  9:48 PM   Result Value Ref Range    Glucose (POC) 140 (H) 65 - 117 mg/dL    Performed by Demetrice Christianson ([place])    METABOLIC PANEL, COMPREHENSIVE    Collection Time: 06/03/21  6:16 AM   Result Value Ref Range    Sodium 137 136 - 145 mmol/L    Potassium 3.8 3.5 - 5.1 mmol/L    Chloride 106 97 - 108 mmol/L    CO2 24 21 - 32 mmol/L    Anion gap 7 5 - 15 mmol/L    Glucose 94 65 - 100 mg/dL    BUN 27 (H) 6 - 20 mg/dL    Creatinine 1.34 (H) 0.70 - 1.30 mg/dL    BUN/Creatinine ratio 20 12 - 20      GFR est AA >60 >60 ml/min/1.73m2    GFR est non-AA 53 (L) >60 ml/min/1.73m2    Calcium 9.1 8.5 - 10.1 mg/dL    Bilirubin, total 0.6 0.2 - 1.0 mg/dL    AST (SGOT) 25 15 - 37 U/L    ALT (SGPT) 30 12 - 78 U/L    Alk. phosphatase 65 45 - 117 U/L    Protein, total 7.9 6.4 - 8.2 g/dL    Albumin 2.8 (L) 3.5 - 5.0 g/dL    Globulin 5.1 (H) 2.0 - 4.0 g/dL    A-G Ratio 0.5 (L) 1.1 - 2.2     GLUCOSE, POC    Collection Time: 06/03/21  8:47 AM   Result Value Ref Range    Glucose (POC) 147 (H) 65 - 117 mg/dL    Performed by Meenu Cunningham([facility])    CBC WITH AUTOMATED DIFF    Collection Time: 06/03/21  9:49 AM   Result Value Ref Range    WBC 7.0 4.1 - 11.1 K/uL    RBC 3.83 (L) 4.10 - 5.70 M/uL    HGB 10.7 (L) 12.1 - 17.0 g/dL    HCT 35.2 (L) 36.6 - 50.3 %    MCV 91.9 80.0 - 99.0 FL    MCH 27.9 26.0 - 34.0 PG    MCHC 30.4 30.0 - 36.5 g/dL    RDW 14.7 (H) 11.5 - 14.5 %    PLATELET 331 743 - 420 K/uL    MPV 11.1 8.9 - 12.9 FL    NRBC 0.0 0.0  WBC    ABSOLUTE NRBC 0.00 0.00 - 0.01 K/uL    NEUTROPHILS 64 32 - 75 %    LYMPHOCYTES 23 12 - 49 %    MONOCYTES 9 5 - 13 %    EOSINOPHILS 4 0 - 7 %    BASOPHILS 0 0 - 1 %    IMMATURE GRANULOCYTES 0 0 - 0.5 %    ABS. NEUTROPHILS 4.4 1.8 - 8.0 K/UL    ABS. LYMPHOCYTES 1.6 0.8 - 3.5 K/UL    ABS. MONOCYTES 0.6 0.0 - 1.0 K/UL    ABS. EOSINOPHILS 0.3 0.0 - 0.4 K/UL    ABS. BASOPHILS 0.0 0.0 - 0.1 K/UL    ABS. IMM. GRANS. 0.0 0.00 - 0.04 K/UL    DF AUTOMATED     GLUCOSE, POC    Collection Time: 06/03/21 11:47 AM   Result Value Ref Range    Glucose (POC) 136 (H) 65 - 117 mg/dL    Performed by Meenu Cunningham(PCT)      [unfilled]      Review of Systems    Constitutional: Negative for chills and fever. HENT: Negative. Eyes: Negative. Respiratory: Negative. Cardiovascular: Negative. Gastrointestinal: Negative for abdominal pain and nausea. Skin: Negative. Neurological: Negative.         Physical Exam: Constitutional: pt is oriented to person, place, and time. HENT:   Head: Normocephalic and atraumatic. Eyes: Pupils are equal, round, and reactive to light. EOM are normal.   Cardiovascular: Normal rate, regular rhythm and normal heart sounds. Pulmonary/Chest: Breath sounds normal. No wheezes. No rales. Exhibits no tenderness. Abdominal: Soft. Bowel sounds are normal. There is no abdominal tenderness. There is no rebound and no guarding. Musculoskeletal: Normal range of motion. Neurological: pt is alert and oriented to person, place, and time. Extremities: Venous ulcers R and L lateral lower legs with surrounding discoloration of lower extremities, currently covered with wound dressings    XR CHEST PORT   Final Result   Stable mild-to-moderate enlargement of cardiomediastinal silhouette. Mediastinum underpenetrated with suboptimal visualization of right atrial and   right ventricular pacing leads. There is obscuration of a portion of the left   hemithorax by the generator device. No pneumothorax. No evidence of pulmonary   edema, air space pneumonia, or pleural effusion.            Recent Results (from the past 24 hour(s))   GLUCOSE, POC    Collection Time: 06/02/21  5:25 PM   Result Value Ref Range    Glucose (POC) 160 (H) 65 - 117 mg/dL    Performed by Noa Cunningham(PCT)    GLUCOSE, POC    Collection Time: 06/02/21  8:48 PM   Result Value Ref Range    Glucose (POC) 152 (H) 65 - 117 mg/dL    Performed by Valeri Natarajan    METABOLIC PANEL, BASIC    Collection Time: 06/02/21  9:48 PM   Result Value Ref Range    Sodium 138 136 - 145 mmol/L    Potassium 3.9 3.5 - 5.1 mmol/L    Chloride 106 97 - 108 mmol/L    CO2 25 21 - 32 mmol/L    Anion gap 7 5 - 15 mmol/L    Glucose 132 (H) 65 - 100 mg/dL    BUN 30 (H) 6 - 20 mg/dL    Creatinine 1.45 (H) 0.70 - 1.30 mg/dL    BUN/Creatinine ratio 21 (H) 12 - 20      GFR est AA 59 (L) >60 ml/min/1.73m2    GFR est non-AA 49 (L) >60 ml/min/1.73m2    Calcium 8.9 8.5 - 10.1 mg/dL   GLUCOSE, POC    Collection Time: 06/02/21  9:48 PM   Result Value Ref Range    Glucose (POC) 140 (H) 65 - 117 mg/dL    Performed by Shad [facility])    METABOLIC PANEL, COMPREHENSIVE    Collection Time: 06/03/21  6:16 AM   Result Value Ref Range    Sodium 137 136 - 145 mmol/L    Potassium 3.8 3.5 - 5.1 mmol/L    Chloride 106 97 - 108 mmol/L    CO2 24 21 - 32 mmol/L    Anion gap 7 5 - 15 mmol/L    Glucose 94 65 - 100 mg/dL    BUN 27 (H) 6 - 20 mg/dL    Creatinine 1.34 (H) 0.70 - 1.30 mg/dL    BUN/Creatinine ratio 20 12 - 20      GFR est AA >60 >60 ml/min/1.73m2    GFR est non-AA 53 (L) >60 ml/min/1.73m2    Calcium 9.1 8.5 - 10.1 mg/dL    Bilirubin, total 0.6 0.2 - 1.0 mg/dL    AST (SGOT) 25 15 - 37 U/L    ALT (SGPT) 30 12 - 78 U/L    Alk. phosphatase 65 45 - 117 U/L    Protein, total 7.9 6.4 - 8.2 g/dL    Albumin 2.8 (L) 3.5 - 5.0 g/dL    Globulin 5.1 (H) 2.0 - 4.0 g/dL    A-G Ratio 0.5 (L) 1.1 - 2.2     GLUCOSE, POC    Collection Time: 06/03/21  8:47 AM   Result Value Ref Range    Glucose (POC) 147 (H) 65 - 117 mg/dL    Performed by Edy Cunningham(PCT)    CBC WITH AUTOMATED DIFF    Collection Time: 06/03/21  9:49 AM   Result Value Ref Range    WBC 7.0 4.1 - 11.1 K/uL    RBC 3.83 (L) 4.10 - 5.70 M/uL    HGB 10.7 (L) 12.1 - 17.0 g/dL    HCT 35.2 (L) 36.6 - 50.3 %    MCV 91.9 80.0 - 99.0 FL    MCH 27.9 26.0 - 34.0 PG    MCHC 30.4 30.0 - 36.5 g/dL    RDW 14.7 (H) 11.5 - 14.5 %    PLATELET 915 615 - 220 K/uL    MPV 11.1 8.9 - 12.9 FL    NRBC 0.0 0.0  WBC    ABSOLUTE NRBC 0.00 0.00 - 0.01 K/uL    NEUTROPHILS 64 32 - 75 %    LYMPHOCYTES 23 12 - 49 %    MONOCYTES 9 5 - 13 %    EOSINOPHILS 4 0 - 7 %    BASOPHILS 0 0 - 1 %    IMMATURE GRANULOCYTES 0 0 - 0.5 %    ABS. NEUTROPHILS 4.4 1.8 - 8.0 K/UL    ABS. LYMPHOCYTES 1.6 0.8 - 3.5 K/UL    ABS. MONOCYTES 0.6 0.0 - 1.0 K/UL    ABS. EOSINOPHILS 0.3 0.0 - 0.4 K/UL    ABS. BASOPHILS 0.0 0.0 - 0.1 K/UL    ABS. IMM.  GRANS. 0.0 0.00 - 0.04 K/UL    DF AUTOMATED     GLUCOSE, POC    Collection Time: 06/03/21 11:47 AM   Result Value Ref Range    Glucose (POC) 136 (H) 65 - 117 mg/dL    Performed by Raya Cunningham(PCT)        Results     Procedure Component Value Units Date/Time    CULTURE, BLOOD #1 [[id number]] Collected: 06/02/21 1231    Order Status: Completed Specimen: Blood Updated: 06/02/21 1248    CULTURE, BLOOD #2 [[id number]] Collected: 06/02/21 1231    Order Status: Completed Specimen: Blood Updated: 06/02/21 1249    CULTURE, BLOOD, PAIRED [[id number]] Collected: 06/01/21 2015    Order Status: Completed Specimen: Blood Updated: 06/03/21 1055     Special Requests: No Special Requests        Culture result: No growth 1 day              Labs:     Recent Labs     06/03/21  0949 06/01/21 2020   WBC 7.0 10.3   HGB 10.7* 12.3   HCT 35.2* 39.9    329     Recent Labs     06/03/21  0616 06/02/21 2148 06/01/21 2020    138 138   K 3.8 3.9 3.8    106 101   CO2 24 25 27   BUN 27* 30* 34*   CREA 1.34* 1.45* 1.70*   GLU 94 132* 169*   CA 9.1 8.9 9.4     Recent Labs     06/03/21 0616   ALT 30   AP 65   TBILI 0.6   TP 7.9   ALB 2.8*   GLOB 5.1*     No results for input(s): INR, PTP, APTT, INREXT, INREXT in the last 72 hours. No results for input(s): FE, TIBC, PSAT, FERR in the last 72 hours. No results found for: FOL, RBCF   No results for input(s): PH, PCO2, PO2 in the last 72 hours.   Recent Labs     06/01/21 2020   TROIQ <0.05     No results found for: CHOL, CHOLX, CHLST, CHOLV, HDL, HDLP, LDL, LDLC, DLDLP, TGLX, TRIGL, TRIGP, CHHD, CHHDX  Lab Results   Component Value Date/Time    Glucose (POC) 136 (H) 06/03/2021 11:47 AM    Glucose (POC) 147 (H) 06/03/2021 08:47 AM    Glucose (POC) 140 (H) 06/02/2021 09:48 PM    Glucose (POC) 152 (H) 06/02/2021 08:48 PM    Glucose (POC) 160 (H) 06/02/2021 05:25 PM     No results found for: COLOR, APPRN, SPGRU, REFSG, KENTON, PROTU, GLUCU, KETU, BILU, UROU, DELMY, LEUKU, GLUKE, EPSU, BACTU, WBCU, RBCU, CASTS, UCRY Assessment:     Acute cellulitis of both legs  Venous stasis ulcer  Chronic hypoxemic respiratory failure  History of CHF diastolic dysfunction  COPD  Type 2 diabetes  Hypertension  Gout  Arthritis   Sleep Apnea   Morbid obesity         Plan:     Per cardiology:   Continue IV diuresis  Stop HCTZ and switch to Bumex IV 1 mg twice a day  Continue aspirin  Monitor kidney function  Stop colchicine due to abnormal kidney function. Daily weights and ins and outs     Per ID:   Continue vancomycin and zosyn pending repeat wound cultures. Restart home dose of Gabapentin. Leg elevation to help with venous return. Routine labs in morning. Per pulmonology: [place] apnea    1.       Start probiotics, per patient request.   Use nystatin for yeast infection.  Groin area    Repeat the labs           Current Facility-Administered Medications:     insulin glargine (LANTUS) injection 32 Units, 32 Units, SubCUTAneous, QHS, Darren Maurer MD [place]  [START ON 6/4/2021] insulin glargine (LANTUS) injection 80 Units, 80 Units, SubCUTAneous, DAILY, Darren Maurer MD    potassium chloride (K-DUR, KLOR-CON) SR tablet 40 mEq, 40 mEq, Oral, BID, Kari Spann MD    piperacillin-tazobactam (ZOSYN) 3.375 g in 0.9% sodium chloride (MBP/ADV) 100 mL MBP, 3.375 g, IntraVENous, Q8H, Moni Patel MD, Last Rate: 25 mL/hr at 06/03/21 0232, 3.375 g at 06/03/21 0232    glucose chewable tablet 16 g, 4 Tablet, Oral, PRN, Moni Patel MD    dextrose (D50W) injection syrg 12.5-25 g, 25-50 mL, IntraVENous, PRN, Gavin Patel MD    glucagon (GLUCAGEN) injection 1 mg, 1 mg, IntraMUSCular, PRN, Gavin Patel MD    vancomycin (VANCOCIN) 1,500 mg in 0.9% sodium chloride 500 mL IVPB, 1,500 mg, IntraVENous, Q18H, Moni Patel MD, Last Rate: 250 mL/hr at 06/03/21 0556, 1,500 mg at 06/03/21 0556    Vancomycin Pharmacy Dosing, , Other, Rx Dosing/Monitoring, Deangelo Pond MD    [START ON 6/4/2021] Vancomycin Trough Level scheduled for 6-4-21 at 1600, , Other, ONCE, Moni Patel MD    albuterol (PROVENTIL HFA, VENTOLIN HFA, PROAIR HFA) inhaler 2 Puff, 2 Puff, Inhalation, Q6H PRN, Marcus Patel MD    ascorbic acid (vitamin C) (VITAMIN C) tablet 1,000 mg, 1,000 mg, Oral, BID, Moni Patel MD, 1,000 mg at 06/03/21 3598    aspirin chewable tablet 81 mg, 81 mg, Oral, DAILY, Moni Patel MD, 81 mg at 06/03/21 5896    ferrous sulfate tablet 325 mg, 1 Tablet, Oral, ACB, Moni Patel MD, 325 mg at 06/03/21 0953    [START ON 6/4/2021] fluticasone propionate (FLONASE) 50 mcg/actuation nasal spray 2 Spray, 2 Spray, Both Nostrils, DAILY, Moni Patel MD    gabapentin (NEURONTIN) capsule 300 mg, 300 mg, Oral, QID, Moni Patel MD, 300 mg at 06/03/21 1259    metFORMIN (GLUCOPHAGE) tablet 1,000 mg, 1,000 mg, Oral, BID WITH MEALS, Kenney Vidal MD, 1,000 mg at 06/03/21 8228    multivitamin (ONE A DAY) tablet 1 Tablet, 1 Tablet, Oral, DAILY, Moni Patel MD, 1 Tablet at 06/03/21 0953    tamsulosin (FLOMAX) capsule 0.4 mg, 0.4 mg, Oral, DAILY, Moni Patel MD, 0.4 mg at 06/03/21 1518    tiotropium bromide (SPIRIVA RESPIMAT) 2.5 mcg /actuation, 5 mcg, Inhalation, DAILY, Kenney Vidal MD, 2 Puff at 06/03/21 0954    insulin lispro (HUMALOG) injection, , SubCUTAneous, AC&HS, Moni Patel MD, 3 Units at 06/03/21 1000    acetaminophen (TYLENOL) tablet 650 mg, 650 mg, Oral, Q6H PRN **OR** acetaminophen (TYLENOL) suppository 650 mg, 650 mg, Rectal, Q6H PRN, Marcus Patel MD    polyethylene glycol (MIRALAX) packet 17 g, 17 g, Oral, DAILY PRN, Marcus Patel MD    promethazine (PHENERGAN) tablet 12.5 mg, 12.5 mg, Oral, Q6H PRN **OR** ondansetron (ZOFRAN) injection 4 mg, 4 mg, IntraVENous, Q6H PRN, Moni Patel MD    oxyCODONE IR (ROXICODONE) tablet 10 mg, 10 mg, Oral, Q6H PRN, Moni Patel MD, 10 mg at 06/03/21 1259    vitamin e (E GEMS) capsule 800 Units, 800 Units, Oral, BID, Juliana Grady MD, 800 Units at 06/03/21 1031    bumetanide (BUMEX) injection 1 mg, 1 mg, IntraVENous, BID, Kari Spann MD, 1 mg at 06/03/21 6383

## 2021-06-03 NOTE — PROGRESS NOTES
Infectious Disease Progress Note           Subjective:   Pt seen and examined at bedside. B/l leg pains in better, venous doppler ordered.  Denies new complaints   Objective:   Physical Exam:     Visit Vitals  /76   Pulse 88   Temp 99 °F (37.2 °C)   Resp 12   Ht 5' 11\" (1.803 m)   Wt (!) 377 lb (171 kg)   SpO2 99%   BMI 52.58 kg/m²    O2 Flow Rate (L/min): 4 l/min O2 Device: Nasal cannula, Other (comment) (with trilogy)    Temp (24hrs), Av.4 °F (36.9 °C), Min:97.9 °F (36.6 °C), Max:99 °F (37.2 °C)    701 - 1900  In: -   Out: 880 [Urine:880]   1901 -  07  In: -   Out:  [[facility]:4970]    General: NAD, alert, AAO x 4  HEENT: SURYA, Moist mucosa   Lungs: CTA b/l, decreased at the bases, no wheeze/rhonchi   Heart: S1S2+, RRR, no murmur  Abdo: Soft, NT, ND, +BS   Exts: decreased b/l leg swelling, hyperpigmented skin changes   Skin: superficial ulcerations involving lateral surfaces of both legs       Data Review:       Recent Days:  Recent Labs     21  0949 21   WBC 7.0 10.3   HGB 10.7* 12.3   HCT 35.2* 39.9    329     Recent Labs     21  0616 21  2148 21   BUN 27* 30* 34*   CREA 1.34* 1.45* 1.70*       No results found for: CRPQN, CRP, CRPM       Microbiology     Results     Procedure Component Value Units Date/Time    CULTURE, BLOOD #1 [[id number]] Collected: 21    Order Status: Completed Specimen: Blood Updated: 21 124    CULTURE, BLOOD #2 [[id number]] Collected: 21    Order Status: Completed Specimen: Blood Updated: 21 1249    CULTURE, BLOOD, PAIRED [[id number]] Collected: 21    Order Status: Completed Specimen: Blood Updated: 21 1055     Special Requests: No Special Requests        Culture result: No growth 1 day                Diagnostics   CXR Results  (Last 48 hours)               21  XR CHEST PORT Final result    Impression:  Stable mild-to-moderate enlargement of cardiomediastinal silhouette. Mediastinum underpenetrated with suboptimal visualization of right atrial and   right ventricular pacing leads. There is obscuration of a portion of the left   hemithorax by the generator device. No pneumothorax. No evidence of pulmonary   edema, air space pneumonia, or pleural effusion. Narrative:  Portable chest, 1949 hours. Comparison with 6/22/2020. Assessment/Plan     1. Infected venous stasis ulcers involving b/l LEs L>R, chronic w superinfection       E faecalis, E. Cloacae and Alcaligenes faecalis isolated from wound Cx on 05/10, repeat on 06/01 is pending       Afebrile today, WBC normalized       Continue on Vanc and Zosyn pending repeat wound Cxs       Routine labs in the morning. F/u venous doppler to eval for DVT     2. B/l leg cellulitis: Decreased leg swelling and erythema      3. B/l LE pain/discomfort: Continue symptomatic mgt        4.  COPD w suspected exacerbation,  Dyspneic on presentation      Cardiomegaly on CXR w/o evidence of focal infiltrate or edema       Emanuel Almazan MD    6/3/2021

## 2021-06-03 NOTE — PROGRESS NOTES
PULMONARY NOTE  [facility]    Name: Noy Regan MRN: [id number]   : 1955 Hospital: [facility]   Date: 6/3/2021  Admission date: 2021 Hospital Day: 3       HPI:     Hospital Problems  Date Reviewed: 2021        Codes Class Noted POA    Cellulitis and abscess of right leg ICD-10-CM: L03.115, L02.415  ICD-9-CM: 682.6  2021 Unknown        Cellulitis ICD-10-CM: L03.90  ICD-9-CM: 682.9  2021 Unknown                   [x] High complexity decision making was performed  [x] See my orders for details      Subjective/Initial History:     I was asked by Sree Grayson MD to see Noy Regan  a 76 y.o.  male in consultation     Excerpts from admission 2021 or consult notes as follows:   67-year-old morbidly obese gentleman came in because of leg pain and swelling he has history of venous stasis leg edema diabetes mellitus, significant past medical history of COPD chronic hypoxic hypercapnic respiratory failure on trilogy noninvasive ventilator at home he was seen by me about a month ago he was doing fairly well he was compliant with his trilogy ventilator came in because of swelling of the lower extremities his BNP was elevated.        Allergies   Allergen Reactions    Elavil Unknown (comments)    Sulfa (Sulfonamide Antibiotics) Nausea and Vomiting        MAR reviewed and pertinent medications noted or modified as needed     Current Facility-Administered Medications   Medication    piperacillin-tazobactam (ZOSYN) 3.375 g in 0.9% sodium chloride (MBP/ADV) 100 mL MBP    glucose chewable tablet 16 g    dextrose (D50W) injection syrg 12.5-25 g    glucagon (GLUCAGEN) injection 1 mg    vancomycin (VANCOCIN) 1,500 mg in 0.9% sodium chloride 500 mL IVPB    Vancomycin Pharmacy Dosing    [START ON 2021] Vancomycin Trough Level scheduled for 21 at 1600    albuterol (PROVENTIL HFA, VENTOLIN HFA, PROAIR HFA) inhaler 2 Puff    ascorbic acid (vitamin C) (VITAMIN C) tablet 1,000 mg    aspirin chewable tablet 81 mg    ferrous sulfate tablet 325 mg    . PHARMACY TO SUBSTITUTE PER PROTOCOL (Reordered from: flunisolide (NASAREL) 25 mcg (0.025 %) spry)    gabapentin (NEURONTIN) capsule 300 mg    insulin glargine (LANTUS) injection 20 Units    metFORMIN (GLUCOPHAGE) tablet 1,000 mg    multivitamin (ONE A DAY) tablet 1 Tablet    tamsulosin (FLOMAX) capsule 0.4 mg    tiotropium bromide (SPIRIVA RESPIMAT) 2.5 mcg /actuation    insulin lispro (HUMALOG) injection    acetaminophen (TYLENOL) tablet 650 mg    Or    acetaminophen (TYLENOL) suppository 650 mg    polyethylene glycol (MIRALAX) packet 17 g    promethazine (PHENERGAN) tablet 12.5 mg    Or    ondansetron (ZOFRAN) injection 4 mg    oxyCODONE IR (ROXICODONE) tablet 10 mg    vitamin e (E GEMS) capsule 800 Units    bumetanide (BUMEX) injection 1 mg      Patient PCP: Keri Stallings MD  PMH:  has a past medical history of Arthritis, Chronic obstructive pulmonary disease ([facility] [address] Diabetes ([facility] [address] Gout, Hypertension, Ill-defined condition, and Sleep apnea. PSH:   has a past surgical history that includes hx orthopaedic and hx vein ablation adhesive. FHX: family history includes Diabetes in his brother, mother, and sister; Heart Disease in his father and mother; Hypertension in his father and mother; Kidney Disease in his mother. SHX:  reports that he has never smoked. He has never used smokeless tobacco. He reports current alcohol use. He reports that he does not use drugs. ROS:    Review of Systems   Constitutional: Positive for malaise/fatigue. HENT: Negative. Eyes: Negative. Respiratory: Positive for shortness of breath. Cardiovascular: Positive for orthopnea and claudication. Gastrointestinal: Negative. Genitourinary: Negative. Musculoskeletal:        Bilateral leg edema   Skin: Negative. Neurological: Negative. Psychiatric/Behavioral: Negative. Objective:     Vital Signs: Telemetry:    normal sinus rhythm Intake/Output:   Visit Vitals  /76   Pulse 88   Temp 99 °F (37.2 °C)   Resp 12   Ht 5' 11\" (1.803 m)   Wt (!) 171 kg (377 lb)   SpO2 99%   BMI 52.58 kg/m²       Temp (24hrs), Av.4 °F (36.9 °C), Min:97.9 °F (36.6 °C), Max:99 °F (37.2 °C)        O2 Device: Nasal cannula, Other (comment) (with trilogy) O2 Flow Rate (L/min): 4 l/min       Wt Readings from Last 4 Encounters:   21 (!) 171 kg (377 lb)   21 136.1 kg (300 lb)   20 (!) 166.5 kg (367 lb)          Intake/Output Summary (Last 24 hours) at 6/3/2021 1119  Last data filed at 6/3/2021 1031  Gross per 24 hour   Intake --   Output 1905 ml   Net -1905 ml       Last shift:       07 -  1900  In: -   Out: 330 [Urine:330]  Last 3 shifts: 1901 -  0700  In: -   Out: 1985 [YAYKD:6130]       Physical Exam:     Physical Exam  Constitutional:       Appearance: He is obese. HENT:      Head: Normocephalic and atraumatic. Nose: Nose normal.      Mouth/Throat:      Mouth: Mucous membranes are dry. Eyes:      Pupils: Pupils are equal, round, and reactive to light. Cardiovascular:      Rate and Rhythm: Normal rate and regular rhythm. Pulses: Normal pulses. Heart sounds: Normal heart sounds. Pulmonary:      Effort: Respiratory distress present. Breath sounds: Rales present. Abdominal:      General: Abdomen is flat. Bowel sounds are normal.      Palpations: Abdomen is soft. Musculoskeletal:         General: Swelling present. Cervical back: Normal range of motion and neck supple. Right lower leg: Edema present. Left lower leg: Edema present. Comments: Erythema tenderness on deep palpation  bilateral lower extremities   Neurological:      Mental Status: He is alert.           Labs:    Recent Labs     21  0949 21   WBC 7.0 10.3   HGB 10.7* 12.3    329     Recent Labs     21  0616 21  2140 06/01/21 2020    138 138   K 3.8 3.9 3.8    106 101   CO2 24 25 27   GLU 94 132* 169*   BUN 27* 30* 34*   CREA 1.34* 1.45* 1.70*   CA 9.1 8.9 9.4   LAC  --   --  2.8*   ALB 2.8*  --   --    ALT 30  --   --      No results for input(s): PH, PCO2, PO2, HCO3, FIO2 in the last 72 hours. Recent Labs     06/01/21 2020   TROIQ <0.05     No results found for: BNPP, BNP   Lab Results   Component Value Date/Time    Culture result: No growth 1 day 06/01/2021 08:15 PM    Culture result: Light Enterobacter cloacae complex 05/10/2021 02:45 PM    Culture result: Light Enterococcus faecalisgroup D 05/10/2021 02:45 PM    Culture result: Scant Alcaligenes faecalis 05/10/2021 02:45 PM   No results found for: TSH, TSHEXT, TSHEXT    Imaging:    CXR Results  (Last 48 hours)               06/01/21 2003  XR CHEST PORT Final result    Impression:  Stable mild-to-moderate enlargement of cardiomediastinal silhouette. Mediastinum underpenetrated with suboptimal visualization of right atrial and   right ventricular pacing leads. There is obscuration of a portion of the left   hemithorax by the generator device. No pneumothorax. No evidence of pulmonary   edema, air space pneumonia, or pleural effusion. Narrative:  Portable chest, 1949 hours. Comparison with 6/22/2020. Results from [place] encounter on 06/01/21    XR CHEST PORT    Narrative  Portable chest, 1949 hours. Comparison with 6/22/2020. Impression  Stable mild-to-moderate enlargement of cardiomediastinal silhouette. Mediastinum underpenetrated with suboptimal visualization of right atrial and  right ventricular pacing leads. There is obscuration of a portion of the left  hemithorax by the generator device. No pneumothorax. No evidence of pulmonary  edema, air space pneumonia, or pleural effusion. No results found for this or any previous visit. IMPRESSION:   1.  Acute on chronic hypoxic hypercapnic respiratory failure  2. Chronic Obstructive Pulmonary Disease   3. Venous stasis ulcer with leg wound chronic leg edema possible cellulitis  4. CHF with diastolic dysfunction  5. Obstructive sleep apnea  6. Obesity gout hypertension type 2 diabetes  7. Acute kidney injury  8. Prognosis guarded       RECOMMENDATIONS/PLAN:     1. Trilogy for non invasive ventilatory life support to prevent worsening respiratory acidosis  2. Patient is not actively wheezing no need for any systemic steroids  3. Continue with Bumex  4. Patient is on vancomycin and Zosyn  5. Supplemental O2 to keep sats > 93%  6. Aspiration precautions  7. Labs to follow electrolytes, renal function and and blood counts  8. Glucose monitoring and SSI  9. Bronchial hygiene with respiratory therapy techniques, bronchodilators  10.  DVT, SUP prophylaxis             Kylah Dickerson MD

## 2021-06-03 NOTE — CONSULTS
History and Physical    Chief complaints: Bilateral leg wounds     History of Presenting Illness:  Kemar Conner is a 76 y.o. pleasant man come to the hospital because of the elevated BNP level with worsening of leg swelling and a worsening bilateral leg wounds. I was contacted from the wound care team consultation. Patient examined this morning. Patient currently wearing BiPAP. Patient complains of some pain in both legs. Patient says she been followed by wound care team at [facility]. Patient has known history of chronic venous stasis ulcers bilaterally. Patient had previously tried Unna boot both legs prior to his hospital admission. Patient says swelling in both legs has gradually gotten worse. Pain level is not too bad this morning. Patient has not been elevating his legs. Patient has now wearing compression stocking for a while because open wounds. Patient denies any fever or chills. Past Medical History:   Diagnosis Date    Arthritis     Chronic obstructive pulmonary disease ([facility] [address]     Diabetes ([facility] [address]     Gout     Hypertension     Ill-defined condition     Sleep apnea       Past Surgical History:   Procedure Laterality Date    HX ORTHOPAEDIC      HX VEIN ABLATION ADHESIVE       Family History   Problem Relation Age of Onset    Heart Disease Mother     Kidney Disease Mother     Hypertension Mother     Diabetes Mother     Heart Disease Father     Hypertension Father     Diabetes Sister     Diabetes Brother       Social History     Tobacco Use    Smoking status: Never Smoker    Smokeless tobacco: Never Used   Substance Use Topics    Alcohol use: Yes       Prior to Admission medications    Medication Sig Start Date End Date Taking? Authorizing Provider   ascorbic acid, vitamin C, (Vitamin C) 500 mg tablet Take 1,000 mg by mouth two (2) times a day. Indications: inadequate vitamin C   Yes Provider, Historical   tamsulosin (Flomax) 0.4 mg capsule Take 0.4 mg by mouth daily. Yes Provider, Historical   tiotropium (Spiriva with HandiHaler) 18 mcg inhalation capsule Take 1 Capsule by inhalation daily. Yes Provider, Historical   bumetanide (BUMEX) 2 mg tablet Take 2 mg by mouth two (2) times a day. Yes Provider, Historical   multivits,Stress Formula-Zinc tablet Take 1 Tab by mouth daily. Yes Provider, Historical   vitamin e (E GEMS) 1,000 unit capsule Take 1,000 Units by mouth two (2) times a day. Yes Provider, Historical   multivitamin (ONE A DAY) tablet Take 1 Tab by mouth daily. Yes Provider, Historical   aspirin 81 mg chewable tablet Take 81 mg by mouth daily. Yes Provider, Historical   flunisolide (NASAREL) 25 mcg (0.025 %) spry 2 Sprays two (2) times a day. Yes Provider, Historical   albuterol (ProAir HFA) 90 mcg/actuation inhaler Take  by inhalation. Yes Provider, Historical   colchicine 0.6 mg tablet Take 0.6 mg by mouth daily. Yes Provider, Historical   gabapentin (NEURONTIN) 300 mg capsule Take 300 mg by mouth four (4) times daily. Yes Provider, Historical   oxyCODONE IR (ROXICODONE) 5 mg immediate release tablet Take 5 mg by mouth every twelve (12) hours. Yes Provider, Historical   metFORMIN (GLUCOPHAGE) 500 mg tablet Take 1,000 mg by mouth two (2) times daily (with meals). Yes Provider, Historical   insulin lispro (HumaLOG U-100 Insulin) 100 unit/mL injection by SubCUTAneous route. Sliding scale   Yes Provider, Historical   ferrous sulfate (Iron) 325 mg (65 mg iron) tablet Take  by mouth Daily (before breakfast). Provider, Historical   hydroCHLOROthiazide (HYDRODIURIL) 25 mg tablet Take 25 mg by mouth daily. Provider, Historical   insulin glargine (LANTUS) 100 unit/mL injection by SubCUTAneous route nightly.  80units am/ 20units pm  Patient not taking: Reported on 6/2/2021    Provider, Historical     Allergies   Allergen Reactions    Elavil Unknown (comments)    Sulfa (Sulfonamide Antibiotics) Nausea and Vomiting        Review of Systems:  Pertinent review of systems discussed in HPI, and rest of organ systems personally reviewed and they are negative. Objective:   Vital signs reviewed:      Visit Vitals  /77 (BP 1 Location: Left lower arm, BP Patient Position: At rest)   Pulse 79   Temp 97.9 °F (36.6 °C)   Resp 22   Ht 5' 11\" (1.803 m)   Wt (!) 365 lb (165.6 kg)   SpO2 95%   BMI 50.91 kg/m²       Physical Exam:   General appearance:   Patient is awake and alert. Head and neck atraumatic normocephalic  ENT oral mucosa is normal there is no hoarse voice no jaundice. Eyes pupil equal gaze appropriate. Cardiovascular system shows regular rate  Pulmonary no audible wheeze  Chest wall excursion normal with respiration cycle  Abdomen soft nontender distended  Skin is warm and moist.   Hematology system shows no bruising. Cranial nerves intact neurologically nonfocal  Musculoskeletal system moving all 4 extremities. I personally did not remove the dressing I examined the wound pictures that was taken from wound care team.  However patient does have a significant swelling both leg extending from the foot to the calf level. There is also mild erythema noted  Patient also has a dermatosclerosis as well both calf level. Data Review: Labs are reviewed.  Discussed  Recent Results (from the past 24 hour(s))   GLUCOSE, POC    Collection Time: 06/02/21 10:06 AM   Result Value Ref Range    Glucose (POC) 186 (H) 65 - 117 mg/dL    Performed by Yuliana Cunningham(PCT)    HEMOGLOBIN A1C WITH EAG    Collection Time: 06/02/21 12:31 PM   Result Value Ref Range    Hemoglobin A1c 6.0 (H) 4.0 - 5.6 %    Est. average glucose 126 mg/dL   GLUCOSE, POC    Collection Time: 06/02/21  5:25 PM   Result Value Ref Range    Glucose (POC) 160 (H) 65 - 117 mg/dL    Performed by Yuliana Cunningham(PCT)    GLUCOSE, POC    Collection Time: 06/02/21  8:48 PM   Result Value Ref Range    Glucose (POC) 152 (H) 65 - 117 mg/dL    Performed by [address] [place] [address] BASIC    Collection Time: 06/02/21  9:48 PM   Result Value Ref Range    Sodium 138 136 - 145 mmol/L    Potassium 3.9 3.5 - 5.1 mmol/L    Chloride 106 97 - 108 mmol/L    CO2 25 21 - 32 mmol/L    Anion gap 7 5 - 15 mmol/L    Glucose 132 (H) 65 - 100 mg/dL    BUN 30 (H) 6 - 20 mg/dL    Creatinine 1.45 (H) 0.70 - 1.30 mg/dL    BUN/Creatinine ratio 21 (H) 12 - 20      GFR est AA 59 (L) >60 ml/min/1.73m2    GFR est non-AA 49 (L) >60 ml/min/1.73m2    Calcium 8.9 8.5 - 10.1 mg/dL   GLUCOSE, POC    Collection Time: 06/02/21  9:48 PM   Result Value Ref Range    Glucose (POC) 140 (H) 65 - 117 mg/dL    Performed by Shaheen Esparza ([place])    METABOLIC PANEL, COMPREHENSIVE    Collection Time: 06/03/21  6:16 AM   Result Value Ref Range    Sodium 137 136 - 145 mmol/L    Potassium 3.8 3.5 - 5.1 mmol/L    Chloride 106 97 - 108 mmol/L    CO2 24 21 - 32 mmol/L    Anion gap 7 5 - 15 mmol/L    Glucose 94 65 - 100 mg/dL    BUN 27 (H) 6 - 20 mg/dL    Creatinine 1.34 (H) 0.70 - 1.30 mg/dL    BUN/Creatinine ratio 20 12 - 20      GFR est AA >60 >60 ml/min/1.73m2    GFR est non-AA 53 (L) >60 ml/min/1.73m2    Calcium 9.1 8.5 - 10.1 mg/dL    Bilirubin, total 0.6 0.2 - 1.0 mg/dL    AST (SGOT) 25 15 - 37 U/L    ALT (SGPT) 30 12 - 78 U/L    Alk. phosphatase 65 45 - 117 U/L    Protein, total 7.9 6.4 - 8.2 g/dL    Albumin 2.8 (L) 3.5 - 5.0 g/dL    Globulin 5.1 (H) 2.0 - 4.0 g/dL    A-G Ratio 0.5 (L) 1.1 - 2.2           Imagings reviewed: discussed as below. Assessment:     Active Problems:    Cellulitis (6/1/2021)      Cellulitis and abscess of right leg (6/2/2021)        Plan:     Most likely patient is a wound etiology is related to chronic venous hypertension uncontrolled swelling. Whether the swelling is is related with a CHF with elevated BNP level or other etiology such as deep vein thrombosis, patient will require continue leg elevation. Cellulitis needs to be treated with the proper antibiotics.   Again patient needs to have swelling and cellulitis need to be controlled before we can apply multilayer layer wraps such as either wound output or Profore dressing. I personally recommend Profore dressing. Anyways I will organize venous duplex ultrasound both legs make sure patient does not have deep vein thrombosis. Otherwise continue local wound care per wound care team and they will keep both legs elevated at least 3 pillows and once the cellulitis is resolved we will apply Profore dressing bilateral leg.

## 2021-06-04 LAB
ALBUMIN SERPL-MCNC: 2.8 G/DL (ref 3.5–5)
ALBUMIN/GLOB SERPL: 0.6 {RATIO} (ref 1.1–2.2)
ALP SERPL-CCNC: 65 U/L (ref 45–117)
ALT SERPL-CCNC: 38 U/L (ref 12–78)
ANION GAP SERPL CALC-SCNC: 5 MMOL/L (ref 5–15)
AST SERPL W P-5'-P-CCNC: 27 U/L (ref 15–37)
BASOPHILS # BLD: 0 K/UL (ref 0–0.1)
BASOPHILS NFR BLD: 0 % (ref 0–1)
BILIRUB SERPL-MCNC: 0.4 MG/DL (ref 0.2–1)
BNP SERPL-MCNC: 1092 PG/ML
BUN SERPL-MCNC: 28 MG/DL (ref 6–20)
BUN/CREAT SERPL: 21 (ref 12–20)
CA-I BLD-MCNC: 8.9 MG/DL (ref 8.5–10.1)
CHLORIDE SERPL-SCNC: 106 MMOL/L (ref 97–108)
CO2 SERPL-SCNC: 29 MMOL/L (ref 21–32)
CREAT SERPL-MCNC: 1.35 MG/DL (ref 0.7–1.3)
DIFFERENTIAL METHOD BLD: ABNORMAL
EOSINOPHIL # BLD: 0.3 K/UL (ref 0–0.4)
EOSINOPHIL NFR BLD: 4 % (ref 0–7)
ERYTHROCYTE [DISTWIDTH] IN BLOOD BY AUTOMATED COUNT: 14.8 % (ref 11.5–14.5)
GLOBULIN SER CALC-MCNC: 4.8 G/DL (ref 2–4)
GLUCOSE BLD STRIP.AUTO-MCNC: 104 MG/DL (ref 65–117)
GLUCOSE BLD STRIP.AUTO-MCNC: 114 MG/DL (ref 65–117)
GLUCOSE BLD STRIP.AUTO-MCNC: 123 MG/DL (ref 65–117)
GLUCOSE BLD STRIP.AUTO-MCNC: 99 MG/DL (ref 65–117)
GLUCOSE SERPL-MCNC: 110 MG/DL (ref 65–100)
HCT VFR BLD AUTO: 37.4 % (ref 36.6–50.3)
HGB BLD-MCNC: 11.3 G/DL (ref 12.1–17)
IMM GRANULOCYTES # BLD AUTO: 0 K/UL (ref 0–0.04)
IMM GRANULOCYTES NFR BLD AUTO: 0 % (ref 0–0.5)
LYMPHOCYTES # BLD: 1.6 K/UL (ref 0.8–3.5)
LYMPHOCYTES NFR BLD: 23 % (ref 12–49)
MCH RBC QN AUTO: 27.9 PG (ref 26–34)
MCHC RBC AUTO-ENTMCNC: 30.2 G/DL (ref 30–36.5)
MCV RBC AUTO: 92.3 FL (ref 80–99)
MONOCYTES # BLD: 0.5 K/UL (ref 0–1)
MONOCYTES NFR BLD: 7 % (ref 5–13)
NEUTS SEG # BLD: 4.6 K/UL (ref 1.8–8)
NEUTS SEG NFR BLD: 66 % (ref 32–75)
NRBC # BLD: 0 K/UL (ref 0–0.01)
NRBC BLD-RTO: 0 PER 100 WBC
PERFORMED BY, TECHID: ABNORMAL
PERFORMED BY, TECHID: NORMAL
PLATELET # BLD AUTO: 287 K/UL (ref 150–400)
PMV BLD AUTO: 11.1 FL (ref 8.9–12.9)
POTASSIUM SERPL-SCNC: 3.7 MMOL/L (ref 3.5–5.1)
PROT SERPL-MCNC: 7.6 G/DL (ref 6.4–8.2)
RBC # BLD AUTO: 4.05 M/UL (ref 4.1–5.7)
SODIUM SERPL-SCNC: 140 MMOL/L (ref 136–145)
WBC # BLD AUTO: 7.1 K/UL (ref 4.1–11.1)

## 2021-06-04 PROCEDURE — 74011000258 HC RX REV CODE- 258: Performed by: FAMILY MEDICINE

## 2021-06-04 PROCEDURE — 36415 COLL VENOUS BLD VENIPUNCTURE: CPT

## 2021-06-04 PROCEDURE — 74011250637 HC RX REV CODE- 250/637: Performed by: INTERNAL MEDICINE

## 2021-06-04 PROCEDURE — 74011250636 HC RX REV CODE- 250/636: Performed by: FAMILY MEDICINE

## 2021-06-04 PROCEDURE — 74011250637 HC RX REV CODE- 250/637: Performed by: FAMILY MEDICINE

## 2021-06-04 PROCEDURE — 94760 N-INVAS EAR/PLS OXIMETRY 1: CPT

## 2021-06-04 PROCEDURE — 80053 COMPREHEN METABOLIC PANEL: CPT

## 2021-06-04 PROCEDURE — 82962 GLUCOSE BLOOD TEST: CPT

## 2021-06-04 PROCEDURE — 74011250637 HC RX REV CODE- 250/637: Performed by: SURGERY

## 2021-06-04 PROCEDURE — 74011250637 HC RX REV CODE- 250/637

## 2021-06-04 PROCEDURE — 77010033678 HC OXYGEN DAILY

## 2021-06-04 PROCEDURE — 83880 ASSAY OF NATRIURETIC PEPTIDE: CPT

## 2021-06-04 PROCEDURE — 74011000250 HC RX REV CODE- 250: Performed by: INTERNAL MEDICINE

## 2021-06-04 PROCEDURE — 94640 AIRWAY INHALATION TREATMENT: CPT

## 2021-06-04 PROCEDURE — 85025 COMPLETE CBC W/AUTO DIFF WBC: CPT

## 2021-06-04 PROCEDURE — 74011636637 HC RX REV CODE- 636/637: Performed by: INTERNAL MEDICINE

## 2021-06-04 PROCEDURE — 99232 SBSQ HOSP IP/OBS MODERATE 35: CPT | Performed by: INTERNAL MEDICINE

## 2021-06-04 PROCEDURE — 65270000029 HC RM PRIVATE

## 2021-06-04 RX ORDER — AMMONIUM LACTATE 12 G/100G
CREAM TOPICAL AS NEEDED
Status: DISCONTINUED | OUTPATIENT
Start: 2021-06-04 | End: 2021-06-10 | Stop reason: HOSPADM

## 2021-06-04 RX ADMIN — POTASSIUM CHLORIDE 40 MEQ: 1500 TABLET, EXTENDED RELEASE ORAL at 08:53

## 2021-06-04 RX ADMIN — FERROUS SULFATE TAB 325 MG (65 MG ELEMENTAL FE) 325 MG: 325 (65 FE) TAB at 08:53

## 2021-06-04 RX ADMIN — GABAPENTIN 300 MG: 300 CAPSULE ORAL at 21:22

## 2021-06-04 RX ADMIN — OXYCODONE HYDROCHLORIDE AND ACETAMINOPHEN 1000 MG: 500 TABLET ORAL at 21:22

## 2021-06-04 RX ADMIN — OXYCODONE HYDROCHLORIDE AND ACETAMINOPHEN 1000 MG: 500 TABLET ORAL at 08:53

## 2021-06-04 RX ADMIN — METFORMIN HYDROCHLORIDE 1000 MG: 500 TABLET ORAL at 16:00

## 2021-06-04 RX ADMIN — PIPERACILLIN AND TAZOBACTAM 3.38 G: 3; .375 INJECTION, POWDER, LYOPHILIZED, FOR SOLUTION INTRAVENOUS at 04:21

## 2021-06-04 RX ADMIN — OXYCODONE HYDROCHLORIDE 10 MG: 10 TABLET ORAL at 15:35

## 2021-06-04 RX ADMIN — VANCOMYCIN HYDROCHLORIDE 1500 MG: 10 INJECTION, POWDER, LYOPHILIZED, FOR SOLUTION INTRAVENOUS at 02:01

## 2021-06-04 RX ADMIN — INSULIN GLARGINE 32 UNITS: 100 INJECTION, SOLUTION SUBCUTANEOUS at 21:21

## 2021-06-04 RX ADMIN — PIPERACILLIN AND TAZOBACTAM 3.38 G: 3; .375 INJECTION, POWDER, LYOPHILIZED, FOR SOLUTION INTRAVENOUS at 09:03

## 2021-06-04 RX ADMIN — NYSTATIN: 100000 POWDER TOPICAL at 08:54

## 2021-06-04 RX ADMIN — INSULIN GLARGINE 80 UNITS: 100 INJECTION, SOLUTION SUBCUTANEOUS at 08:55

## 2021-06-04 RX ADMIN — FLUTICASONE PROPIONATE 2 SPRAY: 50 SPRAY, METERED NASAL at 09:03

## 2021-06-04 RX ADMIN — Medication 250 MG: at 21:22

## 2021-06-04 RX ADMIN — MULTIVITAMIN TABLET 1 TABLET: TABLET at 08:52

## 2021-06-04 RX ADMIN — METFORMIN HYDROCHLORIDE 1000 MG: 500 TABLET ORAL at 08:53

## 2021-06-04 RX ADMIN — NYSTATIN: 100000 POWDER TOPICAL at 21:22

## 2021-06-04 RX ADMIN — POTASSIUM CHLORIDE 40 MEQ: 1500 TABLET, EXTENDED RELEASE ORAL at 21:22

## 2021-06-04 RX ADMIN — GABAPENTIN 300 MG: 300 CAPSULE ORAL at 17:42

## 2021-06-04 RX ADMIN — PIPERACILLIN AND TAZOBACTAM 3.38 G: 3; .375 INJECTION, POWDER, LYOPHILIZED, FOR SOLUTION INTRAVENOUS at 15:33

## 2021-06-04 RX ADMIN — VITAMIN E CAP 100 UNIT 800 UNITS: 100 CAP at 08:52

## 2021-06-04 RX ADMIN — VITAMIN E CAP 100 UNIT 800 UNITS: 100 CAP at 21:26

## 2021-06-04 RX ADMIN — BUMETANIDE 1 MG: 0.25 INJECTION INTRAMUSCULAR; INTRAVENOUS at 08:56

## 2021-06-04 RX ADMIN — Medication 250 MG: at 08:53

## 2021-06-04 RX ADMIN — TAMSULOSIN HYDROCHLORIDE 0.4 MG: 0.4 CAPSULE ORAL at 08:52

## 2021-06-04 RX ADMIN — BUMETANIDE 1 MG: 0.25 INJECTION INTRAMUSCULAR; INTRAVENOUS at 21:27

## 2021-06-04 RX ADMIN — OXYCODONE HYDROCHLORIDE 10 MG: 10 TABLET ORAL at 09:15

## 2021-06-04 RX ADMIN — GABAPENTIN 300 MG: 300 CAPSULE ORAL at 12:51

## 2021-06-04 RX ADMIN — GABAPENTIN 300 MG: 300 CAPSULE ORAL at 08:53

## 2021-06-04 RX ADMIN — PIPERACILLIN AND TAZOBACTAM 3.38 G: 3; .375 INJECTION, POWDER, LYOPHILIZED, FOR SOLUTION INTRAVENOUS at 23:32

## 2021-06-04 RX ADMIN — OXYCODONE HYDROCHLORIDE 10 MG: 10 TABLET ORAL at 03:10

## 2021-06-04 RX ADMIN — OXYCODONE HYDROCHLORIDE 10 MG: 10 TABLET ORAL at 21:26

## 2021-06-04 RX ADMIN — COLCHICINE 0.6 MG: 0.6 TABLET, FILM COATED ORAL at 08:53

## 2021-06-04 RX ADMIN — ASPIRIN 81 MG: 81 TABLET, CHEWABLE ORAL at 08:53

## 2021-06-04 NOTE — PROGRESS NOTES
PULMONARY NOTE  [facility]    Name: Marychuy Garcia MRN: [id number]   : 1955 Hospital: [address] [place] [address]   Date: 2021  Admission date: 2021 Hospital Day: 4       HPI:     Hospital Problems  Date Reviewed: 2021        Codes Class Noted POA    Cellulitis and abscess of right leg ICD-10-CM: L03.115, L02.415  ICD-9-CM: 682.6  2021 Unknown        Cellulitis ICD-10-CM: L03.90  ICD-9-CM: 682.9  2021 Unknown                   [x] High complexity decision making was performed  [x] See my orders for details      Subjective/Initial History:     I was asked by Yossi Kapoor MD to see Marychuy Garcia  a 76 y.o.  male in consultation     Excerpts from admission 2021 or consult notes as follows:   76-year-old morbidly obese gentleman came in because of leg pain and swelling he has history of venous stasis leg edema diabetes mellitus, significant past medical history of COPD chronic hypoxic hypercapnic respiratory failure on trilogy noninvasive ventilator at home he was seen by me about a month ago he was doing fairly well he was compliant with his trilogy ventilator came in because of swelling of the lower extremities his BNP was elevated.        Allergies   Allergen Reactions    Elavil Unknown (comments)    Sulfa (Sulfonamide Antibiotics) Nausea and Vomiting        MAR reviewed and pertinent medications noted or modified as needed     Current Facility-Administered Medications   Medication    insulin glargine (LANTUS) injection 32 Units    insulin glargine (LANTUS) injection 80 Units    potassium chloride (K-DUR, KLOR-CON) SR tablet 40 mEq    nystatin (MYCOSTATIN) 100,000 unit/gram powder    Saccharomyces boulardii (FLORASTOR) capsule 250 mg    colchicine tablet 0.6 mg    piperacillin-tazobactam (ZOSYN) 3.375 g in 0.9% sodium chloride (MBP/ADV) 100 mL MBP    glucose chewable tablet 16 g    dextrose (D50W) injection syrg 12.5-25 g    glucagon (GLUCAGEN) injection 1 mg    vancomycin (VANCOCIN) 1,500 mg in 0.9% sodium chloride 500 mL IVPB    Vancomycin Pharmacy Dosing    Vancomycin Trough Level scheduled for 6-4-21 at 1600    albuterol (PROVENTIL HFA, VENTOLIN HFA, PROAIR HFA) inhaler 2 Puff    ascorbic acid (vitamin C) (VITAMIN C) tablet 1,000 mg    aspirin chewable tablet 81 mg    ferrous sulfate tablet 325 mg    fluticasone propionate (FLONASE) 50 mcg/actuation nasal spray 2 Spray    gabapentin (NEURONTIN) capsule 300 mg    metFORMIN (GLUCOPHAGE) tablet 1,000 mg    multivitamin (ONE A DAY) tablet 1 Tablet    tamsulosin (FLOMAX) capsule 0.4 mg    tiotropium bromide (SPIRIVA RESPIMAT) 2.5 mcg /actuation    insulin lispro (HUMALOG) injection    acetaminophen (TYLENOL) tablet 650 mg    Or    acetaminophen (TYLENOL) suppository 650 mg    polyethylene glycol (MIRALAX) packet 17 g    promethazine (PHENERGAN) tablet 12.5 mg    Or    ondansetron (ZOFRAN) injection 4 mg    oxyCODONE IR (ROXICODONE) tablet 10 mg    vitamin e (E GEMS) capsule 800 Units    bumetanide (BUMEX) injection 1 mg      Patient PCP: Severo Spore, MD  PMH:  has a past medical history of Arthritis, Chronic obstructive pulmonary disease ([facility] [address] Diabetes ([facility] [address] Gout, Hypertension, Ill-defined condition, and Sleep apnea. PSH:   has a past surgical history that includes hx orthopaedic and hx vein ablation adhesive. FHX: family history includes Diabetes in his brother, mother, and sister; Heart Disease in his father and mother; Hypertension in his father and mother; Kidney Disease in his mother. SHX:  reports that he has never smoked. He has never used smokeless tobacco. He reports current alcohol use. He reports that he does not use drugs. ROS:    Review of Systems   Constitutional: Positive for malaise/fatigue. HENT: Negative. Eyes: Negative. Respiratory: Positive for shortness of breath. Cardiovascular: Positive for orthopnea and claudication. Gastrointestinal: Negative. Genitourinary: Negative. Musculoskeletal:        Bilateral leg edema   Skin: Negative. Neurological: Negative. Psychiatric/Behavioral: Negative. Objective:     Vital Signs: Telemetry:    normal sinus rhythm Intake/Output:   Visit Vitals  BP (!) 141/69   Pulse 87   Temp 97.5 °F (36.4 °C)   Resp 18   Ht 5' 11\" (1.803 m)   Wt (!) 171 kg (377 lb)   SpO2 97%   BMI 52.58 kg/m²       Temp (24hrs), Av.9 °F (36.6 °C), Min:97.5 °F (36.4 °C), Max:98.8 °F (37.1 °C)        O2 Device: Nasal cannula O2 Flow Rate (L/min): 3 l/min       Wt Readings from Last 4 Encounters:   21 (!) 171 kg (377 lb)   21 136.1 kg (300 lb)   20 (!) 166.5 kg (367 lb)          Intake/Output Summary (Last 24 hours) at 2021 1058  Last data filed at 2021 0424  Gross per 24 hour   Intake --   Output 1350 ml   Net -1350 ml       Last shift:      No intake/output data recorded. Last 3 shifts:  1901 -  0700  In: -   Out: 3255 [Urine:3255]       Physical Exam:     Physical Exam  Constitutional:       Appearance: He is obese. HENT:      Head: Normocephalic and atraumatic. Nose: Nose normal.      Mouth/Throat:      Mouth: Mucous membranes are dry. Eyes:      Pupils: Pupils are equal, round, and reactive to light. Cardiovascular:      Rate and Rhythm: Normal rate and regular rhythm. Pulses: Normal pulses. Heart sounds: Normal heart sounds. Pulmonary:      Effort: Respiratory distress present. Breath sounds: Rales present. Abdominal:      General: Abdomen is flat. Bowel sounds are normal.      Palpations: Abdomen is soft. Musculoskeletal:         General: Swelling present. Cervical back: Normal range of motion and neck supple. Right lower leg: Edema present. Left lower leg: Edema present. Comments: Erythema tenderness on deep palpation  bilateral lower extremities   Neurological:      Mental Status: He is alert. Labs:    Recent Labs     06/04/21  0612 06/03/21  0949 06/01/21 2020   WBC 7.1 7.0 10.3   HGB 11.3* 10.7* 12.3    263 329     Recent Labs     06/04/21  0612 06/03/21  0616 06/02/21  2148 06/01/21 2020    137 138 138   K 3.7 3.8 3.9 3.8    106 106 101   CO2 29 24 25 27   * 94 132* 169*   BUN 28* 27* 30* 34*   CREA 1.35* 1.34* 1.45* 1.70*   CA 8.9 9.1 8.9 9.4   LAC  --   --   --  2.8*   ALB 2.8* 2.8*  --   --    ALT 38 30  --   --      No results for input(s): PH, PCO2, PO2, HCO3, FIO2 in the last 72 hours. Recent Labs     06/01/21 2020   TROIQ <0.05     No results found for: BNPP, BNP   Lab Results   Component Value Date/Time    Culture result: No growth after 22 hours 06/02/2021 12:31 PM    Culture result: No growth after 22 hours 06/02/2021 12:31 PM    Culture result: No growth 2 days 06/01/2021 08:15 PM   No results found for: TSH, TSHEXT, TSHEXT    Imaging:    CXR Results  (Last 48 hours)    None        Results from [place] encounter on 06/01/21    XR CHEST PORT    Narrative  Portable chest, 1949 hours. Comparison with 6/22/2020. Impression  Stable mild-to-moderate enlargement of cardiomediastinal silhouette. Mediastinum underpenetrated with suboptimal visualization of right atrial and  right ventricular pacing leads. There is obscuration of a portion of the left  hemithorax by the generator device. No pneumothorax. No evidence of pulmonary  edema, air space pneumonia, or pleural effusion. No results found for this or any previous visit. IMPRESSION:   1. Acute on chronic hypoxic hypercapnic respiratory failure  2. Chronic Obstructive Pulmonary Disease   3. Venous stasis ulcer with leg wound chronic leg edema possible cellulitis  4. CHF with diastolic dysfunction  5. Obstructive sleep apnea  6. Obesity gout hypertension type 2 diabetes  7. Acute kidney injury  8. Prognosis guarded       RECOMMENDATIONS/PLAN:     1.  Trilogy for non invasive ventilatory life support to prevent worsening respiratory acidosis  2. Patient is not actively wheezing no need for any systemic steroids  3. Continue with Bumex  4. Patient is on vancomycin and Zosyn  5. Supplemental O2 to keep sats > 93%  6. Aspiration precautions  7.  DVT, SUP prophylaxis         David Luis MD

## 2021-06-04 NOTE — PROGRESS NOTES
Progress Note    Patient: Arnaud Johnson MRN: [id number]  SSN: [id number]    YOB: 1955  Age: 76 y.o. Sex: male      Admit Date: 6/1/2021    LOS: 2 days     Subjective:   No acute events overnight. He feels better. Objective:     Vitals:    06/03/21 0908 06/03/21 1627 06/03/21 1948 06/04/21 0137   BP:  (!) 144/84 136/75 113/63   Pulse:  80 81 84   Resp:  18 18 20   Temp:  97.5 °F (36.4 °C) 98.8 °F (37.1 °C) 97.6 °F (36.4 °C)   SpO2: 99% 97% 98% 99%   Weight: (!) 171 kg (377 lb)      Height:            Intake and Output:  Current Shift: No intake/output data recorded. Last three shifts: 06/02 1901 - 06/04 0700  In: -   Out: 0372 [Urine:3255]    Physical Exam:   General:  Alert, cooperative, no distress, appears stated age. Eyes:  Conjunctivae/corneas clear. PERRL, EOMs intact. Fundi benign   Ears:  Normal TMs and external ear canals both ears. Nose: Nares normal. Septum midline. Mucosa normal. No drainage or sinus tenderness. Mouth/Throat: Lips, mucosa, and tongue normal. Teeth and gums normal.   Neck: Supple, symmetrical, trachea midline, no adenopathy, thyroid: no enlargment/tenderness/nodules, no carotid bruit and no JVD. Back:   Symmetric, no curvature. ROM normal. No CVA tenderness. Lungs:   Clear to auscultation bilaterally. Heart:  Regular rate and rhythm, S1, S2 normal, no murmur, click, rub or gallop. Abdomen:   Soft, non-tender. Bowel sounds normal. No masses,  No organomegaly. Extremities: Extremities normal, atraumatic, no cyanosis or edema. Pulses: 2+ and symmetric all extremities. Skin: Skin color, texture, turgor normal. No rashes or lesions   Lymph nodes: Cervical, supraclavicular, and axillary nodes normal.   Neurologic: CNII-XII intact. Normal strength, sensation and reflexes throughout. Lab/Data Review: All lab results for the last 24 hours reviewed.          Assessment:     Active Problems:    Cellulitis (6/1/2021)      Cellulitis and abscess of right leg (6/2/2021)    Mr. Louann Rodriguez is a 50-year-old white male with:  1. Heart failure with decompensation. 2.  Morbid obesity. 3.  Hypertension with hypertensive heart disease. 4.  Mixed hyperlipidemia  5. Diabetes mellitus with neuropathy. 6.  Venous insufficiency, status post ablation( Dr. Luh Muhammad)  7. Obstructive sleep apnea, on CPAP machine. 8.  COPD  9. Gout. 10. SSS s/p DCPM 9/2019  11. Off BB due to easy fatigability. Plan:     Kidney function is improving with diuresis. Continue IV diuretic. Potassium is 3.7. Replete potassium. Recheck BMP in a.m.   Check an echocardiogram.    Signed By: Gauri Haney MD     June 4, 2021

## 2021-06-04 NOTE — PROGRESS NOTES
General Daily Progress Note          Patient Name:   Elizabeth Fish       YOB: 1955       Age:  76 y.o. Admit Date: 6/1/2021      Subjective:     Patient is a 76y.o. year old male with a history of COPD, diabetes, gout, hypertension, arthritis, and sleep apnea. Patient has venous ulcers on his lower legs bilaterally that were being treated by wound care for several months with no improvement. Wound care recommended admission to the hospital for multidisplinary management of his venous ulcers. He was seen in the ER 6/1 and admitted. He is alert, awake, and in no acute distress. He is still complaining of leg pain and some SOB with exertion. Denies chest pain. Patient would like some cream for his dry, itchy skin. Recently underwent venous doppler that showed:      1. Right leg deep vein and superficial vein system do not show acute venous thrombosis. 2.  Left leg deep vein system do not show acute venous thrombosis. 3.  However there is a focal area of the left mid greater saphenous vein with acute venous thrombosis. 4.  Otherwise bilateral leg deep vein system shows spontaneous, phasic, competent venous flow with augmentation.            Objective:     Visit Vitals  BP (!) 141/69   Pulse 87   Temp 97.5 °F (36.4 °C)   Resp 18   Ht 5' 11\" (1.803 m)   Wt (!) 171 kg (377 lb)   SpO2 98%   BMI 52.58 kg/m²        Recent Results (from the past 24 hour(s))   CBC WITH AUTOMATED DIFF    Collection Time: 06/03/21  9:49 AM   Result Value Ref Range    WBC 7.0 4.1 - 11.1 K/uL    RBC 3.83 (L) 4.10 - 5.70 M/uL    HGB 10.7 (L) 12.1 - 17.0 g/dL    HCT 35.2 (L) 36.6 - 50.3 %    MCV 91.9 80.0 - 99.0 FL    MCH 27.9 26.0 - 34.0 PG    MCHC 30.4 30.0 - 36.5 g/dL    RDW 14.7 (H) 11.5 - 14.5 %    PLATELET 461 040 - 272 K/uL    MPV 11.1 8.9 - 12.9 FL    NRBC 0.0 0.0  WBC    ABSOLUTE NRBC 0.00 0.00 - 0.01 K/uL    NEUTROPHILS 64 32 - 75 %    LYMPHOCYTES 23 12 - 49 %    MONOCYTES 9 5 - 13 % EOSINOPHILS 4 0 - 7 %    BASOPHILS 0 0 - 1 %    IMMATURE GRANULOCYTES 0 0 - 0.5 %    ABS. NEUTROPHILS 4.4 1.8 - 8.0 K/UL    ABS. LYMPHOCYTES 1.6 0.8 - 3.5 K/UL    ABS. MONOCYTES 0.6 0.0 - 1.0 K/UL    ABS. EOSINOPHILS 0.3 0.0 - 0.4 K/UL    ABS. BASOPHILS 0.0 0.0 - 0.1 K/UL    ABS. IMM. GRANS. 0.0 0.00 - 0.04 K/UL    DF AUTOMATED     GLUCOSE, POC    Collection Time: 06/03/21 11:47 AM   Result Value Ref Range    Glucose (POC) 136 (H) 65 - 117 mg/dL    Performed by Allison Cunningham([facility])    GLUCOSE, POC    Collection Time: 06/03/21  4:26 PM   Result Value Ref Range    Glucose (POC) 127 (H) 65 - 117 mg/dL    Performed by Allison Cunningham([facility])    GLUCOSE, POC    Collection Time: 06/03/21  9:38 PM   Result Value Ref Range    Glucose (POC) 92 65 - 117 mg/dL    Performed by Rosalba Coronado    CBC WITH AUTOMATED DIFF    Collection Time: 06/04/21  6:12 AM   Result Value Ref Range    WBC 7.1 4.1 - 11.1 K/uL    RBC 4.05 (L) 4.10 - 5.70 M/uL    HGB 11.3 (L) 12.1 - 17.0 g/dL    HCT 37.4 36.6 - 50.3 %    MCV 92.3 80.0 - 99.0 FL    MCH 27.9 26.0 - 34.0 PG    MCHC 30.2 30.0 - 36.5 g/dL    RDW 14.8 (H) 11.5 - 14.5 %    PLATELET 294 592 - 363 K/uL    MPV 11.1 8.9 - 12.9 FL    NRBC 0.0 0.0  WBC    ABSOLUTE NRBC 0.00 0.00 - 0.01 K/uL    NEUTROPHILS 66 32 - 75 %    LYMPHOCYTES 23 12 - 49 %    MONOCYTES 7 5 - 13 %    EOSINOPHILS 4 0 - 7 %    BASOPHILS 0 0 - 1 %    IMMATURE GRANULOCYTES 0 0 - 0.5 %    ABS. NEUTROPHILS 4.6 1.8 - 8.0 K/UL    ABS. LYMPHOCYTES 1.6 0.8 - 3.5 K/UL    ABS. MONOCYTES 0.5 0.0 - 1.0 K/UL    ABS. EOSINOPHILS 0.3 0.0 - 0.4 K/UL    ABS. BASOPHILS 0.0 0.0 - 0.1 K/UL    ABS. IMM.  GRANS. 0.0 0.00 - 0.04 K/UL    DF AUTOMATED     METABOLIC PANEL, COMPREHENSIVE    Collection Time: 06/04/21  6:12 AM   Result Value Ref Range    Sodium 140 136 - 145 mmol/L    Potassium 3.7 3.5 - 5.1 mmol/L    Chloride 106 97 - 108 mmol/L    CO2 29 21 - 32 mmol/L    Anion gap 5 5 - 15 mmol/L    Glucose 110 (H) 65 - 100 mg/dL    BUN 28 (H) 6 - 20 mg/dL Creatinine 1.35 (H) 0.70 - 1.30 mg/dL    BUN/Creatinine ratio 21 (H) 12 - 20      GFR est AA >60 >60 ml/min/1.73m2    GFR est non-AA 53 (L) >60 ml/min/1.73m2    Calcium 8.9 8.5 - 10.1 mg/dL    Bilirubin, total 0.4 0.2 - 1.0 mg/dL    AST (SGOT) 27 15 - 37 U/L    ALT (SGPT) 38 12 - 78 U/L    Alk. phosphatase 65 45 - 117 U/L    Protein, total 7.6 6.4 - 8.2 g/dL    Albumin 2.8 (L) 3.5 - 5.0 g/dL    Globulin 4.8 (H) 2.0 - 4.0 g/dL    A-G Ratio 0.6 (L) 1.1 - 2.2     GLUCOSE, POC    Collection Time: 06/04/21  7:51 AM   Result Value Ref Range    Glucose (POC) 123 (H) 65 - 117 mg/dL    Performed by Ruby Sotomayor      [unfilled]      Review of Systems    Constitutional: Negative for chills and fever. HENT: Negative. Eyes: Negative. Respiratory: SOB with exertion   Cardiovascular: Negative. Gastrointestinal: Negative for abdominal pain and nausea. Skin: Negative. Neurological: Negative. Physical Exam:      Constitutional: pt is oriented to person, place, and time. HENT:   Head: Normocephalic and atraumatic. Eyes: Pupils are equal, round, and reactive to light. EOM are normal.   Cardiovascular: Normal rate, regular rhythm and normal heart sounds. Pulmonary/Chest: Breath sounds normal. No wheezes. No rales. Exhibits no tenderness. Abdominal: Soft. Bowel sounds are normal. There is no abdominal tenderness. There is no rebound and no guarding. Musculoskeletal: Normal range of motion. Neurological: pt is alert and oriented to person, place, and time. Extremities: Venous ulcers R and L lateral lower legs with surrounding discoloration of lower extremities, currently covered with wound dressings    XR CHEST PORT   Final Result   Stable mild-to-moderate enlargement of cardiomediastinal silhouette. Mediastinum underpenetrated with suboptimal visualization of right atrial and   right ventricular pacing leads.  There is obscuration of a portion of the left   hemithorax by the generator device. No pneumothorax. No evidence of pulmonary   edema, air space pneumonia, or pleural effusion. Recent Results (from the past 24 hour(s))   CBC WITH AUTOMATED DIFF    Collection Time: 06/03/21  9:49 AM   Result Value Ref Range    WBC 7.0 4.1 - 11.1 K/uL    RBC 3.83 (L) 4.10 - 5.70 M/uL    HGB 10.7 (L) 12.1 - 17.0 g/dL    HCT 35.2 (L) 36.6 - 50.3 %    MCV 91.9 80.0 - 99.0 FL    MCH 27.9 26.0 - 34.0 PG    MCHC 30.4 30.0 - 36.5 g/dL    RDW 14.7 (H) 11.5 - 14.5 %    PLATELET 585 909 - 257 K/uL    MPV 11.1 8.9 - 12.9 FL    NRBC 0.0 0.0  WBC    ABSOLUTE NRBC 0.00 0.00 - 0.01 K/uL    NEUTROPHILS 64 32 - 75 %    LYMPHOCYTES 23 12 - 49 %    MONOCYTES 9 5 - 13 %    EOSINOPHILS 4 0 - 7 %    BASOPHILS 0 0 - 1 %    IMMATURE GRANULOCYTES 0 0 - 0.5 %    ABS. NEUTROPHILS 4.4 1.8 - 8.0 K/UL    ABS. LYMPHOCYTES 1.6 0.8 - 3.5 K/UL    ABS. MONOCYTES 0.6 0.0 - 1.0 K/UL    ABS. EOSINOPHILS 0.3 0.0 - 0.4 K/UL    ABS. BASOPHILS 0.0 0.0 - 0.1 K/UL    ABS. IMM.  GRANS. 0.0 0.00 - 0.04 K/UL    DF AUTOMATED     GLUCOSE, POC    Collection Time: 06/03/21 11:47 AM   Result Value Ref Range    Glucose (POC) 136 (H) 65 - 117 mg/dL    Performed by Watson Cunningham([facility])    GLUCOSE, POC    Collection Time: 06/03/21  4:26 PM   Result Value Ref Range    Glucose (POC) 127 (H) 65 - 117 mg/dL    Performed by Watson Cunningham([facility])    GLUCOSE, POC    Collection Time: 06/03/21  9:38 PM   Result Value Ref Range    Glucose (POC) 92 65 - 117 mg/dL    Performed by Maame Shah    CBC WITH AUTOMATED DIFF    Collection Time: 06/04/21  6:12 AM   Result Value Ref Range    WBC 7.1 4.1 - 11.1 K/uL    RBC 4.05 (L) 4.10 - 5.70 M/uL    HGB 11.3 (L) 12.1 - 17.0 g/dL    HCT 37.4 36.6 - 50.3 %    MCV 92.3 80.0 - 99.0 FL    MCH 27.9 26.0 - 34.0 PG    MCHC 30.2 30.0 - 36.5 g/dL    RDW 14.8 (H) 11.5 - 14.5 %    PLATELET 765 360 - 475 K/uL    MPV 11.1 8.9 - 12.9 FL    NRBC 0.0 0.0  WBC    ABSOLUTE NRBC 0.00 0.00 - 0.01 K/uL    NEUTROPHILS 66 32 - 75 % LYMPHOCYTES 23 12 - 49 %    MONOCYTES 7 5 - 13 %    EOSINOPHILS 4 0 - 7 %    BASOPHILS 0 0 - 1 %    IMMATURE GRANULOCYTES 0 0 - 0.5 %    ABS. NEUTROPHILS 4.6 1.8 - 8.0 K/UL    ABS. LYMPHOCYTES 1.6 0.8 - 3.5 K/UL    ABS. MONOCYTES 0.5 0.0 - 1.0 K/UL    ABS. EOSINOPHILS 0.3 0.0 - 0.4 K/UL    ABS. BASOPHILS 0.0 0.0 - 0.1 K/UL    ABS. IMM. GRANS. 0.0 0.00 - 0.04 K/UL    DF AUTOMATED     METABOLIC PANEL, COMPREHENSIVE    Collection Time: 06/04/21  6:12 AM   Result Value Ref Range    Sodium 140 136 - 145 mmol/L    Potassium 3.7 3.5 - 5.1 mmol/L    Chloride 106 97 - 108 mmol/L    CO2 29 21 - 32 mmol/L    Anion gap 5 5 - 15 mmol/L    Glucose 110 (H) 65 - 100 mg/dL    BUN 28 (H) 6 - 20 mg/dL    Creatinine 1.35 (H) 0.70 - 1.30 mg/dL    BUN/Creatinine ratio 21 (H) 12 - 20      GFR est AA >60 >60 ml/min/1.73m2    GFR est non-AA 53 (L) >60 ml/min/1.73m2    Calcium 8.9 8.5 - 10.1 mg/dL    Bilirubin, total 0.4 0.2 - 1.0 mg/dL    AST (SGOT) 27 15 - 37 U/L    ALT (SGPT) 38 12 - 78 U/L    Alk.  phosphatase 65 45 - 117 U/L    Protein, total 7.6 6.4 - 8.2 g/dL    Albumin 2.8 (L) 3.5 - 5.0 g/dL    Globulin 4.8 (H) 2.0 - 4.0 g/dL    A-G Ratio 0.6 (L) 1.1 - 2.2     GLUCOSE, POC    Collection Time: 06/04/21  7:51 AM   Result Value Ref Range    Glucose (POC) 123 (H) 65 - 117 mg/dL    Performed by Kennedi Banks        Results     Procedure Component Value Units Date/Time    CULTURE, BLOOD #1 [[id number]] Collected: 06/02/21 1231    Order Status: Completed Specimen: Blood Updated: 06/02/21 1248    CULTURE, BLOOD #2 [[id number]] Collected: 06/02/21 1231    Order Status: Completed Specimen: Blood Updated: 06/02/21 1249    CULTURE, BLOOD, PAIRED [[id number]] Collected: 06/01/21 2015    Order Status: Completed Specimen: Blood Updated: 06/03/21 1055     Special Requests: No Special Requests        Culture result: No growth 1 day              Labs:     Recent Labs     06/04/21  0612 06/03/21  0949   WBC 7.1 7.0   HGB 11.3* 10.7*   HCT 37.4 35.2*  263     Recent Labs     06/04/21  0612 06/03/21  0616 06/02/21  2148    137 138   K 3.7 3.8 3.9    106 106   CO2 29 24 25   BUN 28* 27* 30*   CREA 1.35* 1.34* 1.45*   * 94 132*   CA 8.9 9.1 8.9     Recent Labs     06/04/21  0612 06/03/21 0616   ALT 38 30   AP 65 65   TBILI 0.4 0.6   TP 7.6 7.9   ALB 2.8* 2.8*   GLOB 4.8* 5.1*     No results for input(s): INR, PTP, APTT, INREXT in the last 72 hours. No results for input(s): FE, TIBC, PSAT, FERR in the last 72 hours. No results found for: FOL, RBCF   No results for input(s): PH, PCO2, PO2 in the last 72 hours. Recent Labs     06/01/21 2020   TROIQ <0.05     No results found for: CHOL, CHOLX, CHLST, CHOLV, HDL, HDLP, LDL, LDLC, DLDLP, TGLX, TRIGL, TRIGP, CHHD, CHHDX  Lab Results   Component Value Date/Time    Glucose (POC) 123 (H) 06/04/2021 07:51 AM    Glucose (POC) 92 06/03/2021 09:38 PM    Glucose (POC) 127 (H) 06/03/2021 04:26 PM    Glucose (POC) 136 (H) 06/03/2021 11:47 AM    Glucose (POC) 147 (H) 06/03/2021 08:47 AM     No results found for: COLOR, APPRN, SPGRU, REFSG, KENTON, PROTU, GLUCU, KETU, BILU, UROU, DELMY, LEUKU, GLUKE, EPSU, BACTU, WBCU, RBCU, CASTS, UCRY      Assessment:        Acute cellulitis of both legs  Venous stasis ulcer  Chronic hypoxemic respiratory failure  History of CHF diastolic dysfunction  COPD  Type 2 diabetes  Hypertension  Gout  Arthritis   Sleep Apnea   Morbid obesity  Superficial Thrombosis of left greater saphenous vein            Plan:     Per cardiology:   Repeat BMP in AM.   Check echo. Continue IV diuresis. Stop HCTZ and switch to Bumex IV 1 mg twice a day  Continue aspirin  Monitor kidney function  Stop colchicine due to abnormal kidney function. Daily weights and ins and outs   Replete potassium-3.7. Per ID:   Continue vancomycin and zosyn pending repeat wound cultures. Restart home dose of Gabapentin. Leg elevation to help with venous return. Routine labs in morning. Continue Trilogy. Continue nystatin for yeast infection in groin area.              Current Facility-Administered Medications:     insulin glargine (LANTUS) injection 32 Units, 32 Units, SubCUTAneous, QHS, Darren Maurer MD, 32 Units at 06/03/21 2145    insulin glargine (LANTUS) injection 80 Units, 80 Units, SubCUTAneous, DAILY, Darren Maurer MD, 80 Units at 06/04/21 0855    potassium chloride (K-DUR, KLOR-CON) SR tablet 40 mEq, 40 mEq, Oral, BID, Kari Spann MD, 40 mEq at 06/04/21 0853    nystatin (MYCOSTATIN) 100,000 unit/gram powder, , Topical, BID, Moni Patel MD, Given at 06/04/21 0854    Saccharomyces boulardii (FLORASTOR) capsule 250 mg, 250 mg, Oral, BID, Moni Patel MD, 250 mg at 06/04/21 0853    colchicine tablet 0.6 mg, 0.6 mg, Oral, DAILY, Samson, Jamar Carey MD, 0.6 mg at 06/04/21 0853    piperacillin-tazobactam (ZOSYN) 3.375 g in 0.9% sodium chloride (MBP/ADV) 100 mL MBP, 3.375 g, IntraVENous, Q8H, Moni Patel MD, Last Rate: 25 mL/hr at 06/04/21 0421, 3.375 g at 06/04/21 0421    glucose chewable tablet 16 g, 4 Tablet, Oral, PRN, Cassie Patel MD    dextrose (D50W) injection syrg 12.5-25 g, 25-50 mL, IntraVENous, PRN, Cassie Patel MD    glucagon (GLUCAGEN) injection 1 mg, 1 mg, IntraMUSCular, PRN, Cassie Patel MD    vancomycin (VANCOCIN) 1,500 mg in 0.9% sodium chloride 500 mL IVPB, 1,500 mg, IntraVENous, Q18H, Moni Patel MD, Last Rate: 250 mL/hr at 06/04/21 0201, 1,500 mg at 06/04/21 0201    Vancomycin Pharmacy Dosing, , Other, Rx Dosing/Monitoring, Cassie Patel MD    Vancomycin Trough Level scheduled for 6-4-21 at 1600, , Other, ONCE, Moni Patel MD    albuterol (PROVENTIL HFA, VENTOLIN HFA, PROAIR HFA) inhaler 2 Puff, 2 Puff, Inhalation, Q6H PRN, Moni Patel MD    ascorbic acid (vitamin C) (VITAMIN C) tablet 1,000 mg, 1,000 mg, Oral, BID, Moni Patel MD, 1,000 mg at 06/04/21 0853    aspirin chewable tablet 81 mg, 81 mg, Oral, DAILY, Moni Patel MD, 81 mg at 06/04/21 2479    ferrous sulfate tablet 325 mg, 1 Tablet, Oral, ACB, Moni Patel MD, 325 mg at 06/04/21 0853    fluticasone propionate (FLONASE) 50 mcg/actuation nasal spray 2 Spray, 2 Spray, Both Nostrils, DAILY, Moni Patel MD    gabapentin (NEURONTIN) capsule 300 mg, 300 mg, Oral, QID, Aimee Patel MD, 300 mg at 06/04/21 2724    metFORMIN (GLUCOPHAGE) tablet 1,000 mg, 1,000 mg, Oral, BID WITH MEALS, Aimee Patel MD, 1,000 mg at 06/04/21 4814    multivitamin (ONE A DAY) tablet 1 Tablet, 1 Tablet, Oral, DAILY, Moni Patel MD, 1 Tablet at 06/04/21 7547    tamsulosin (FLOMAX) capsule 0.4 mg, 0.4 mg, Oral, DAILY, Moni Patel MD, 0.4 mg at 06/04/21 0432    tiotropium bromide (SPIRIVA RESPIMAT) 2.5 mcg /actuation, 5 mcg, Inhalation, DAILY, Moni Patel MD, 2 Puff at 06/04/21 0855    insulin lispro (HUMALOG) injection, , SubCUTAneous, AC&HS, Moni Patel MD, 3 Units at 06/03/21 1000    acetaminophen (TYLENOL) tablet 650 mg, 650 mg, Oral, Q6H PRN **OR** acetaminophen (TYLENOL) suppository 650 mg, 650 mg, Rectal, Q6H PRN, Aimee Patel MD    polyethylene glycol (MIRALAX) packet 17 g, 17 g, Oral, DAILY PRN, Aimee Patel MD    promethazine (PHENERGAN) tablet 12.5 mg, 12.5 mg, Oral, Q6H PRN **OR** ondansetron (ZOFRAN) injection 4 mg, 4 mg, IntraVENous, Q6H PRN, Moni Patel MD    oxyCODONE IR (ROXICODONE) tablet 10 mg, 10 mg, Oral, Q6H PRN, Moni Patel MD, 10 mg at 06/04/21 0310    vitamin e (E GEMS) capsule 800 Units, 800 Units, Oral, BID, Moni Patel MD, 800 Units at 06/04/21 0819    bumetanide (BUMEX) injection 1 mg, 1 mg, IntraVENous, BID, Kari Spann MD, 1 mg at 06/03/21 0547

## 2021-06-04 NOTE — PROGRESS NOTES
Cm attempted to meet with pt a the bedside to complete discharge assessment, however lab was in the room speaking with the patient. Cm to f/up.

## 2021-06-04 NOTE — PROGRESS NOTES
Infectious Disease Progress Note           Subjective:   Pt seen and examined at bedside. Denies new complaints. No acute events since last seen.   Reports ongoing b/l leg pain  Objective:   Physical Exam:     Visit Vitals  BP (!) 141/69   Pulse 87   Temp 97.5 °F (36.4 °C)   Resp 18   Ht 5' 11\" (1.803 m)   Wt (!) 377 lb (171 kg)   SpO2 97%   BMI 52.58 kg/m²    O2 Flow Rate (L/min): 3 l/min O2 Device: Nasal cannula    Temp (24hrs), Av.9 °F (36.6 °C), Min:97.5 °F (36.4 °C), Max:98.8 °F (37.1 °C)    701 - 1900  In: -   Out: 850 [Urine:850]   1901 -  07  In: -   Out: 0881 [LGFN]    General: NAD, alert, AAO x 4  HEENT: SURYA, Moist mucosa   Lungs: CTA b/l, decreased at the bases, no wheeze/rhonchi   Heart: S1S2+, RRR, no murmur  Abdo: Soft, NT, ND, +BS   Exts: resolving b/l leg edema, hyperpigmented skin changes   Skin: superficial ulcerations involving lateral surfaces of both legs     Data Review:       Recent Days:  Recent Labs     21  0612 21  0949 21   WBC 7.1 7.0 10.3   HGB 11.3* 10.7* 12.3   HCT 37.4 35.2* 39.9    263 329     Recent Labs     21  0612 21  0616 21  2148   BUN 28* 27* 30*   CREA 1.35* 1.34* 1.45*       No results found for: CRPQN, CRP, CRPM       Microbiology     Results     Procedure Component Value Units Date/Time    CULTURE, BLOOD #1 [[id number]] Collected: 21 1231    Order Status: Completed Specimen: Blood Updated: 21 1513     Special Requests: No Special Requests        Culture result: No growth 1 day       CULTURE, BLOOD #2 [[id number]] Collected: 21 1231    Order Status: Completed Specimen: Blood Updated: 21 1513     Special Requests: No Special Requests        Culture result: No growth 1 day       CULTURE, BLOOD, PAIRED [[id number]] Collected: 21    Order Status: Completed Specimen: Blood Updated: 21 0913     Special Requests: No Special Requests Culture result: No growth 2 days            Diagnostics   CXR Results  (Last 48 hours)    None           Assessment/Plan     1. Infected venous stasis ulcers involving b/l LEs L>R, chronic w superinfection       E faecalis, E. Cloacae and Alcaligenes faecalis isolated from wound Cx on 05/10      Repeat wound Cx on 06/01 is neg to date       Afebrile, WBC normalized on routine labs       Continue on Zosyn, will d/c Vanc since no h/o MRSA infection or colonization       Will likely benefit from IV antibiotics upon d/c, helped in the past         2. B/l leg cellulitis: improved b/l LE swelling after diuresis, advised on leg elevation to help w venous return      3. B/l LE pain/discomfort: Continue symptomatic mgt        4. CHF w acute exacerbation on admission.  Cardiomegaly on CXR w/o evidence of focal infiltrate or edema       Continue diuresis w Lasix, being followed by cardiology       Celsa Tompkins MD    6/4/2021

## 2021-06-04 NOTE — PROGRESS NOTES
General Daily Progress Note          Patient Name:   Elizabeth Fish       YOB: 1955       Age:  76 y.o. Admit Date: 6/1/2021      Subjective:     Patient is a 76y.o. year old male with a history of COPD, diabetes, gout, hypertension, arthritis, and sleep apnea. Patient has venous ulcers on his lower legs bilaterally that were being treated by wound care for several months with no improvement. Wound care recommended admission to the hospital for multidisplinary management of his venous ulcers. He was seen in the ER 6/1 and admitted. He is alert, awake, and in no acute distress. He is still complaining of leg pain and some SOB with exertion. Denies chest pain. Patient would like some cream for his dry, itchy skin. Recently underwent venous doppler that showed:      1. Right leg deep vein and superficial vein system do not show acute venous thrombosis. 2.  Left leg deep vein system do not show acute venous thrombosis. 3.  However there is a focal area of the left mid greater saphenous vein with acute venous thrombosis. 4.  Otherwise bilateral leg deep vein system shows spontaneous, phasic, competent venous flow with augmentation.            Objective:     Visit Vitals  BP (!) 141/69   Pulse 87   Temp 97.5 °F (36.4 °C)   Resp 18   Ht 5' 11\" (1.803 m)   Wt (!) 171 kg (377 lb)   SpO2 97%   BMI 52.58 kg/m²        Recent Results (from the past 24 hour(s))   GLUCOSE, POC    Collection Time: 06/03/21  4:26 PM   Result Value Ref Range    Glucose (POC) 127 (H) 65 - 117 mg/dL    Performed by Shira Cunningham([facility])    GLUCOSE, POC    Collection Time: 06/03/21  9:38 PM   Result Value Ref Range    Glucose (POC) 92 65 - 117 mg/dL    Performed by Oracio LOMBARDI    CBC WITH AUTOMATED DIFF    Collection Time: 06/04/21  6:12 AM   Result Value Ref Range    WBC 7.1 4.1 - 11.1 K/uL    RBC 4.05 (L) 4.10 - 5.70 M/uL    HGB 11.3 (L) 12.1 - 17.0 g/dL    HCT 37.4 36.6 - 50.3 %    MCV 92.3 80.0 - 99.0 FL    MCH 27.9 26.0 - 34.0 PG    MCHC 30.2 30.0 - 36.5 g/dL    RDW 14.8 (H) 11.5 - 14.5 %    PLATELET 281 498 - 807 K/uL    MPV 11.1 8.9 - 12.9 FL    NRBC 0.0 0.0  WBC    ABSOLUTE NRBC 0.00 0.00 - 0.01 K/uL    NEUTROPHILS 66 32 - 75 %    LYMPHOCYTES 23 12 - 49 %    MONOCYTES 7 5 - 13 %    EOSINOPHILS 4 0 - 7 %    BASOPHILS 0 0 - 1 %    IMMATURE GRANULOCYTES 0 0 - 0.5 %    ABS. NEUTROPHILS 4.6 1.8 - 8.0 K/UL    ABS. LYMPHOCYTES 1.6 0.8 - 3.5 K/UL    ABS. MONOCYTES 0.5 0.0 - 1.0 K/UL    ABS. EOSINOPHILS 0.3 0.0 - 0.4 K/UL    ABS. BASOPHILS 0.0 0.0 - 0.1 K/UL    ABS. IMM. GRANS. 0.0 0.00 - 0.04 K/UL    DF AUTOMATED     METABOLIC PANEL, COMPREHENSIVE    Collection Time: 06/04/21  6:12 AM   Result Value Ref Range    Sodium 140 136 - 145 mmol/L    Potassium 3.7 3.5 - 5.1 mmol/L    Chloride 106 97 - 108 mmol/L    CO2 29 21 - 32 mmol/L    Anion gap 5 5 - 15 mmol/L    Glucose 110 (H) 65 - 100 mg/dL    BUN 28 (H) 6 - 20 mg/dL    Creatinine 1.35 (H) 0.70 - 1.30 mg/dL    BUN/Creatinine ratio 21 (H) 12 - 20      GFR est AA >60 >60 ml/min/1.73m2    GFR est non-AA 53 (L) >60 ml/min/1.73m2    Calcium 8.9 8.5 - 10.1 mg/dL    Bilirubin, total 0.4 0.2 - 1.0 mg/dL    AST (SGOT) 27 15 - 37 U/L    ALT (SGPT) 38 12 - 78 U/L    Alk. phosphatase 65 45 - 117 U/L    Protein, total 7.6 6.4 - 8.2 g/dL    Albumin 2.8 (L) 3.5 - 5.0 g/dL    Globulin 4.8 (H) 2.0 - 4.0 g/dL    A-G Ratio 0.6 (L) 1.1 - 2.2     GLUCOSE, POC    Collection Time: 06/04/21  7:51 AM   Result Value Ref Range    Glucose (POC) 123 (H) 65 - 117 mg/dL    Performed by Ruby Sotomayor    GLUCOSE, POC    Collection Time: 06/04/21 11:31 AM   Result Value Ref Range    Glucose (POC) 104 65 - 117 mg/dL    Performed by Ruby Sotomayor      [unfilled]      Review of Systems    Constitutional: Negative for chills and fever. HENT: Negative. Eyes: Negative. Respiratory: SOB with exertion   Cardiovascular: Negative. Gastrointestinal: Negative for abdominal pain and nausea.    Skin: Negative. Neurological: Negative. Physical Exam:      Constitutional: pt is oriented to person, place, and time. HENT:   Head: Normocephalic and atraumatic. Eyes: Pupils are equal, round, and reactive to light. EOM are normal.   Cardiovascular: Normal rate, regular rhythm and normal heart sounds. Pulmonary/Chest: Breath sounds normal. No wheezes. No rales. Exhibits no tenderness. Abdominal: Soft. Bowel sounds are normal. There is no abdominal tenderness. There is no rebound and no guarding. Musculoskeletal: Normal range of motion. Neurological: pt is alert and oriented to person, place, and time. Extremities: Venous ulcers R and L lateral lower legs with surrounding discoloration of lower extremities, currently covered with wound dressings    XR CHEST PORT   Final Result   Stable mild-to-moderate enlargement of cardiomediastinal silhouette. Mediastinum underpenetrated with suboptimal visualization of right atrial and   right ventricular pacing leads. There is obscuration of a portion of the left   hemithorax by the generator device. No pneumothorax. No evidence of pulmonary   edema, air space pneumonia, or pleural effusion.            Recent Results (from the past 24 hour(s))   GLUCOSE, POC    Collection Time: 06/03/21  4:26 PM   Result Value Ref Range    Glucose (POC) 127 (H) 65 - 117 mg/dL    Performed by Thanh Cunningham([facility])    GLUCOSE, POC    Collection Time: 06/03/21  9:38 PM   Result Value Ref Range    Glucose (POC) 92 65 - 117 mg/dL    Performed by Juan Jose Ugarte    CBC WITH AUTOMATED DIFF    Collection Time: 06/04/21  6:12 AM   Result Value Ref Range    WBC 7.1 4.1 - 11.1 K/uL    RBC 4.05 (L) 4.10 - 5.70 M/uL    HGB 11.3 (L) 12.1 - 17.0 g/dL    HCT 37.4 36.6 - 50.3 %    MCV 92.3 80.0 - 99.0 FL    MCH 27.9 26.0 - 34.0 PG    MCHC 30.2 30.0 - 36.5 g/dL    RDW 14.8 (H) 11.5 - 14.5 %    PLATELET 945 336 - 445 K/uL    MPV 11.1 8.9 - 12.9 FL    NRBC 0.0 0.0  WBC    ABSOLUTE NRBC 0.00 0.00 - 0.01 K/uL    NEUTROPHILS 66 32 - 75 %    LYMPHOCYTES 23 12 - 49 %    MONOCYTES 7 5 - 13 %    EOSINOPHILS 4 0 - 7 %    BASOPHILS 0 0 - 1 %    IMMATURE GRANULOCYTES 0 0 - 0.5 %    ABS. NEUTROPHILS 4.6 1.8 - 8.0 K/UL    ABS. LYMPHOCYTES 1.6 0.8 - 3.5 K/UL    ABS. MONOCYTES 0.5 0.0 - 1.0 K/UL    ABS. EOSINOPHILS 0.3 0.0 - 0.4 K/UL    ABS. BASOPHILS 0.0 0.0 - 0.1 K/UL    ABS. IMM. GRANS. 0.0 0.00 - 0.04 K/UL    DF AUTOMATED     METABOLIC PANEL, COMPREHENSIVE    Collection Time: 06/04/21  6:12 AM   Result Value Ref Range    Sodium 140 136 - 145 mmol/L    Potassium 3.7 3.5 - 5.1 mmol/L    Chloride 106 97 - 108 mmol/L    CO2 29 21 - 32 mmol/L    Anion gap 5 5 - 15 mmol/L    Glucose 110 (H) 65 - 100 mg/dL    BUN 28 (H) 6 - 20 mg/dL    Creatinine 1.35 (H) 0.70 - 1.30 mg/dL    BUN/Creatinine ratio 21 (H) 12 - 20      GFR est AA >60 >60 ml/min/1.73m2    GFR est non-AA 53 (L) >60 ml/min/1.73m2    Calcium 8.9 8.5 - 10.1 mg/dL    Bilirubin, total 0.4 0.2 - 1.0 mg/dL    AST (SGOT) 27 15 - 37 U/L    ALT (SGPT) 38 12 - 78 U/L    Alk.  phosphatase 65 45 - 117 U/L    Protein, total 7.6 6.4 - 8.2 g/dL    Albumin 2.8 (L) 3.5 - 5.0 g/dL    Globulin 4.8 (H) 2.0 - 4.0 g/dL    A-G Ratio 0.6 (L) 1.1 - 2.2     GLUCOSE, POC    Collection Time: 06/04/21  7:51 AM   Result Value Ref Range    Glucose (POC) 123 (H) 65 - 117 mg/dL    Performed by "[employer]dasia    GLUCOSE, POC    Collection Time: 06/04/21 11:31 AM   Result Value Ref Range    Glucose (POC) 104 65 - 117 mg/dL    Performed by Gill Read        Results     Procedure Component Value Units Date/Time    CULTURE, BLOOD #1 [[id number]] Collected: 06/02/21 1231    Order Status: Completed Specimen: Blood Updated: 06/04/21 0913     Special Requests: No Special Requests        Culture result: No growth after 22 hours       CULTURE, BLOOD #2 [[id number]] Collected: 06/02/21 1231    Order Status: Completed Specimen: Blood Updated: 06/04/21 0913     Special Requests: No Special Requests Culture result: No growth after 22 hours       CULTURE, BLOOD, PAIRED [[id number]] Collected: 06/01/21 2015    Order Status: Completed Specimen: Blood Updated: 06/04/21 0913     Special Requests: No Special Requests        Culture result: No growth 2 days              Labs:     Recent Labs     06/04/21  0612 06/03/21  0949   WBC 7.1 7.0   HGB 11.3* 10.7*   HCT 37.4 35.2*    263     Recent Labs     06/04/21 0612 06/03/21  0616 06/02/21  2148    137 138   K 3.7 3.8 3.9    106 106   CO2 29 24 25   BUN 28* 27* 30*   CREA 1.35* 1.34* 1.45*   * 94 132*   CA 8.9 9.1 8.9     Recent Labs     06/04/21 0612 06/03/21  0616   ALT 38 30   AP 65 65   TBILI 0.4 0.6   TP 7.6 7.9   ALB 2.8* 2.8*   GLOB 4.8* 5.1*     No results for input(s): INR, PTP, APTT, INREXT, INREXT in the last 72 hours. No results for input(s): FE, TIBC, PSAT, FERR in the last 72 hours. No results found for: FOL, RBCF   No results for input(s): PH, PCO2, PO2 in the last 72 hours. Recent Labs     06/01/21 2020   TROIQ <0.05     No results found for: CHOL, CHOLX, CHLST, CHOLV, HDL, HDLP, LDL, LDLC, DLDLP, TGLX, TRIGL, TRIGP, CHHD, CHHDX  Lab Results   Component Value Date/Time    Glucose (POC) 104 06/04/2021 11:31 AM    Glucose (POC) 123 (H) 06/04/2021 07:51 AM    Glucose (POC) 92 06/03/2021 09:38 PM    Glucose (POC) 127 (H) 06/03/2021 04:26 PM    Glucose (POC) 136 (H) 06/03/2021 11:47 AM     No results found for: COLOR, APPRN, SPGRU, REFSG, KENTON, PROTU, GLUCU, KETU, BILU, UROU, DELMY, LEUKU, GLUKE, EPSU, BACTU, WBCU, RBCU, CASTS, UCRY      Assessment:        Acute cellulitis of both legs  Venous stasis ulcer  Chronic hypoxemic respiratory failure  History of CHF diastolic dysfunction  COPD  Type 2 diabetes  Hypertension  Gout  Arthritis   Sleep Apnea   Morbid obesity  Superficial Thrombosis of left greater saphenous vein            Plan:       Repeat BMP in AM.   Check echo. Continue IV diuresis.    Stop HCTZ and switch to Bumex IV 1 mg twice a day  Continue aspirin  Monitor kidney function  Stop colchicine due to abnormal kidney function. Daily weights and ins and outs   Replete potassium-3.7. Per ID:   Continue vancomycin and zosyn pending repeat wound cultures. Restart home dose of Gabapentin. Leg elevation to help with venous return. Routine labs in morning. Continue Trilogy. Continue nystatin for yeast infection in groin area.      PT OT consult            Current Facility-Administered Medications:     insulin glargine (LANTUS) injection 32 Units, 32 Units, SubCUTAneous, QHS, Darren Maurer MD, 32 Units at 06/03/21 2145    insulin glargine (LANTUS) injection 80 Units, 80 Units, SubCUTAneous, DAILY, Darren Maurer MD, 80 Units at 06/04/21 0855    potassium chloride (K-DUR, KLOR-CON) SR tablet 40 mEq, 40 mEq, Oral, BID, Kari Spann MD, 40 mEq at 06/04/21 0853    nystatin (MYCOSTATIN) 100,000 unit/gram powder, , Topical, BID, Moni Patel MD, Given at 06/04/21 0854    Saccharomyces boulardii (FLORASTOR) capsule 250 mg, 250 mg, Oral, BID, Moni Patel MD, 250 mg at 06/04/21 0853    colchicine tablet 0.6 mg, 0.6 mg, Oral, DAILY, Samson, Deloris Escamilla MD, 0.6 mg at 06/04/21 0853    piperacillin-tazobactam (ZOSYN) 3.375 g in 0.9% sodium chloride (MBP/ADV) 100 mL MBP, 3.375 g, IntraVENous, Q8H, Moni Patel MD, Last Rate: 25 mL/hr at 06/04/21 0903, 3.375 g at 06/04/21 1977    glucose chewable tablet 16 g, 4 Tablet, Oral, PRN, Moni Patel MD    dextrose (D50W) injection syrg 12.5-25 g, 25-50 mL, IntraVENous, PRN, Estephania Patel MD    glucagon (GLUCAGEN) injection 1 mg, 1 mg, IntraMUSCular, PRN, Estephania Patel MD    vancomycin (VANCOCIN) 1,500 mg in 0.9% sodium chloride 500 mL IVPB, 1,500 mg, IntraVENous, Q18H, Moni Patel MD, Last Rate: 250 mL/hr at 06/04/21 0201, 1,500 mg at 06/04/21 0201    Vancomycin Pharmacy Dosing, , Other, Rx Dosing/Monitoring, Estephania Patel, MD    Vancomycin Trough Level scheduled for 6-4-21 at 1600, , Other, ONCE, Ellen Patel MD    albuterol (PROVENTIL HFA, VENTOLIN HFA, PROAIR HFA) inhaler 2 Puff, 2 Puff, Inhalation, Q6H PRN, Ellen Patel MD    ascorbic acid (vitamin C) (VITAMIN C) tablet 1,000 mg, 1,000 mg, Oral, BID, Moni Patel MD, 1,000 mg at 06/04/21 5815    aspirin chewable tablet 81 mg, 81 mg, Oral, DAILY, Moni Patel MD, 81 mg at 06/04/21 0052    ferrous sulfate tablet 325 mg, 1 Tablet, Oral, ACB, Moni Patel MD, 325 mg at 06/04/21 0853    fluticasone propionate (FLONASE) 50 mcg/actuation nasal spray 2 Spray, 2 Spray, Both Nostrils, DAILY, Moni Patel MD, 2 Spray at 06/04/21 9822    gabapentin (NEURONTIN) capsule 300 mg, 300 mg, Oral, QID, Ellen Patel MD, 300 mg at 06/04/21 2180    metFORMIN (GLUCOPHAGE) tablet 1,000 mg, 1,000 mg, Oral, BID WITH MEALS, Tigre Ramos MD, 1,000 mg at 06/04/21 0138    multivitamin (ONE A DAY) tablet 1 Tablet, 1 Tablet, Oral, DAILY, Moni Patel MD, 1 Tablet at 06/04/21 0852    tamsulosin (FLOMAX) capsule 0.4 mg, 0.4 mg, Oral, DAILY, Moni Patel MD, 0.4 mg at 06/04/21 4339    tiotropium bromide (SPIRIVA RESPIMAT) 2.5 mcg /actuation, 5 mcg, Inhalation, DAILY, Tigre Ramos MD, 2 Puff at 06/04/21 0855    insulin lispro (HUMALOG) injection, , SubCUTAneous, AC&HS, Moni Patel MD, 3 Units at 06/03/21 1000    acetaminophen (TYLENOL) tablet 650 mg, 650 mg, Oral, Q6H PRN **OR** acetaminophen (TYLENOL) suppository 650 mg, 650 mg, Rectal, Q6H PRN, Ellen Patel MD    polyethylene glycol (MIRALAX) packet 17 g, 17 g, Oral, DAILY PRN, Ellen Patel MD    promethazine (PHENERGAN) tablet 12.5 mg, 12.5 mg, Oral, Q6H PRN **OR** ondansetron (ZOFRAN) injection 4 mg, 4 mg, IntraVENous, Q6H PRN, Moni Patel MD    oxyCODONE IR (ROXICODONE) tablet 10 mg, 10 mg, Oral, Q6H PRN, Moni Patel MD, 10 mg at 06/04/21 0915    vitamin e (E GEMS) capsule 800 Units, 800 Units, Oral, BID, Moni Patel MD, 800 Units at 06/04/21 1926    bumetanide (BUMEX) injection 1 mg, 1 mg, IntraVENous, BID, Kari Spann MD, 1 mg at 06/04/21 1187

## 2021-06-05 ENCOUNTER — APPOINTMENT (OUTPATIENT)
Dept: NON INVASIVE DIAGNOSTICS | Age: 66
DRG: 299 | End: 2021-06-05
Attending: INTERNAL MEDICINE
Payer: MEDICARE

## 2021-06-05 LAB
ALBUMIN SERPL-MCNC: 3 G/DL (ref 3.5–5)
ALBUMIN/GLOB SERPL: 0.6 {RATIO} (ref 1.1–2.2)
ALP SERPL-CCNC: 74 U/L (ref 45–117)
ALT SERPL-CCNC: 43 U/L (ref 12–78)
ANION GAP SERPL CALC-SCNC: 10 MMOL/L (ref 5–15)
AST SERPL W P-5'-P-CCNC: 32 U/L (ref 15–37)
BILIRUB SERPL-MCNC: 0.4 MG/DL (ref 0.2–1)
BUN SERPL-MCNC: 29 MG/DL (ref 6–20)
BUN/CREAT SERPL: 20 (ref 12–20)
CA-I BLD-MCNC: 8.9 MG/DL (ref 8.5–10.1)
CHLORIDE SERPL-SCNC: 108 MMOL/L (ref 97–108)
CO2 SERPL-SCNC: 24 MMOL/L (ref 21–32)
CREAT SERPL-MCNC: 1.42 MG/DL (ref 0.7–1.3)
ERYTHROCYTE [DISTWIDTH] IN BLOOD BY AUTOMATED COUNT: 14.8 % (ref 11.5–14.5)
GLOBULIN SER CALC-MCNC: 4.8 G/DL (ref 2–4)
GLUCOSE BLD STRIP.AUTO-MCNC: 100 MG/DL (ref 65–117)
GLUCOSE BLD STRIP.AUTO-MCNC: 106 MG/DL (ref 65–117)
GLUCOSE BLD STRIP.AUTO-MCNC: 110 MG/DL (ref 65–117)
GLUCOSE BLD STRIP.AUTO-MCNC: 97 MG/DL (ref 65–117)
GLUCOSE SERPL-MCNC: 108 MG/DL (ref 65–100)
HCT VFR BLD AUTO: 37.4 % (ref 36.6–50.3)
HGB BLD-MCNC: 11 G/DL (ref 12.1–17)
MCH RBC QN AUTO: 27.4 PG (ref 26–34)
MCHC RBC AUTO-ENTMCNC: 29.4 G/DL (ref 30–36.5)
MCV RBC AUTO: 93 FL (ref 80–99)
NRBC # BLD: 0 K/UL (ref 0–0.01)
NRBC BLD-RTO: 0 PER 100 WBC
PERFORMED BY, TECHID: NORMAL
PLATELET # BLD AUTO: 343 K/UL (ref 150–400)
PMV BLD AUTO: 10.7 FL (ref 8.9–12.9)
POTASSIUM SERPL-SCNC: 4.4 MMOL/L (ref 3.5–5.1)
PROT SERPL-MCNC: 7.8 G/DL (ref 6.4–8.2)
RBC # BLD AUTO: 4.02 M/UL (ref 4.1–5.7)
SODIUM SERPL-SCNC: 142 MMOL/L (ref 136–145)
WBC # BLD AUTO: 7.4 K/UL (ref 4.1–11.1)

## 2021-06-05 PROCEDURE — 80053 COMPREHEN METABOLIC PANEL: CPT

## 2021-06-05 PROCEDURE — 99232 SBSQ HOSP IP/OBS MODERATE 35: CPT | Performed by: SURGERY

## 2021-06-05 PROCEDURE — 94760 N-INVAS EAR/PLS OXIMETRY 1: CPT

## 2021-06-05 PROCEDURE — 85027 COMPLETE CBC AUTOMATED: CPT

## 2021-06-05 PROCEDURE — 97161 PT EVAL LOW COMPLEX 20 MIN: CPT

## 2021-06-05 PROCEDURE — 77010033678 HC OXYGEN DAILY

## 2021-06-05 PROCEDURE — C8929 TTE W OR WO FOL WCON,DOPPLER: HCPCS

## 2021-06-05 PROCEDURE — 74011636637 HC RX REV CODE- 636/637: Performed by: INTERNAL MEDICINE

## 2021-06-05 PROCEDURE — 74011250636 HC RX REV CODE- 250/636: Performed by: FAMILY MEDICINE

## 2021-06-05 PROCEDURE — 74011250637 HC RX REV CODE- 250/637: Performed by: SURGERY

## 2021-06-05 PROCEDURE — 74011000258 HC RX REV CODE- 258: Performed by: FAMILY MEDICINE

## 2021-06-05 PROCEDURE — 74011250637 HC RX REV CODE- 250/637

## 2021-06-05 PROCEDURE — 74011250637 HC RX REV CODE- 250/637: Performed by: INTERNAL MEDICINE

## 2021-06-05 PROCEDURE — 82962 GLUCOSE BLOOD TEST: CPT

## 2021-06-05 PROCEDURE — 36415 COLL VENOUS BLD VENIPUNCTURE: CPT

## 2021-06-05 PROCEDURE — 74011250636 HC RX REV CODE- 250/636

## 2021-06-05 PROCEDURE — 74011250637 HC RX REV CODE- 250/637: Performed by: FAMILY MEDICINE

## 2021-06-05 PROCEDURE — 74011000250 HC RX REV CODE- 250: Performed by: INTERNAL MEDICINE

## 2021-06-05 PROCEDURE — 94640 AIRWAY INHALATION TREATMENT: CPT

## 2021-06-05 PROCEDURE — 65270000029 HC RM PRIVATE

## 2021-06-05 RX ORDER — LANOLIN ALCOHOL/MO/W.PET/CERES
800 CREAM (GRAM) TOPICAL 2 TIMES DAILY
Status: DISCONTINUED | OUTPATIENT
Start: 2021-06-05 | End: 2021-06-10 | Stop reason: HOSPADM

## 2021-06-05 RX ORDER — METFORMIN HYDROCHLORIDE 500 MG/1
1000 TABLET ORAL 2 TIMES DAILY WITH MEALS
Status: DISCONTINUED | OUTPATIENT
Start: 2021-06-07 | End: 2021-06-10 | Stop reason: HOSPADM

## 2021-06-05 RX ADMIN — BUMETANIDE 1 MG: 0.25 INJECTION INTRAMUSCULAR; INTRAVENOUS at 08:52

## 2021-06-05 RX ADMIN — PIPERACILLIN AND TAZOBACTAM 3.38 G: 3; .375 INJECTION, POWDER, LYOPHILIZED, FOR SOLUTION INTRAVENOUS at 09:04

## 2021-06-05 RX ADMIN — OXYCODONE HYDROCHLORIDE 10 MG: 10 TABLET ORAL at 22:04

## 2021-06-05 RX ADMIN — OXYCODONE HYDROCHLORIDE AND ACETAMINOPHEN 1000 MG: 500 TABLET ORAL at 21:00

## 2021-06-05 RX ADMIN — OXYCODONE HYDROCHLORIDE 10 MG: 10 TABLET ORAL at 10:30

## 2021-06-05 RX ADMIN — PIPERACILLIN AND TAZOBACTAM 3.38 G: 3; .375 INJECTION, POWDER, LYOPHILIZED, FOR SOLUTION INTRAVENOUS at 21:58

## 2021-06-05 RX ADMIN — Medication 250 MG: at 08:51

## 2021-06-05 RX ADMIN — POTASSIUM CHLORIDE 40 MEQ: 1500 TABLET, EXTENDED RELEASE ORAL at 08:52

## 2021-06-05 RX ADMIN — MULTIVITAMIN TABLET 1 TABLET: TABLET at 08:52

## 2021-06-05 RX ADMIN — COLCHICINE 0.6 MG: 0.6 TABLET, FILM COATED ORAL at 08:51

## 2021-06-05 RX ADMIN — PERFLUTREN: 6.52 INJECTION, SUSPENSION INTRAVENOUS at 13:00

## 2021-06-05 RX ADMIN — OXYCODONE HYDROCHLORIDE AND ACETAMINOPHEN 1000 MG: 500 TABLET ORAL at 08:52

## 2021-06-05 RX ADMIN — FLUTICASONE PROPIONATE 2 SPRAY: 50 SPRAY, METERED NASAL at 08:55

## 2021-06-05 RX ADMIN — ASPIRIN 81 MG: 81 TABLET, CHEWABLE ORAL at 08:51

## 2021-06-05 RX ADMIN — INSULIN GLARGINE 80 UNITS: 100 INJECTION, SOLUTION SUBCUTANEOUS at 08:54

## 2021-06-05 RX ADMIN — NYSTATIN: 100000 POWDER TOPICAL at 08:55

## 2021-06-05 RX ADMIN — GABAPENTIN 300 MG: 300 CAPSULE ORAL at 08:51

## 2021-06-05 RX ADMIN — Medication 800 UNITS: at 15:23

## 2021-06-05 RX ADMIN — TAMSULOSIN HYDROCHLORIDE 0.4 MG: 0.4 CAPSULE ORAL at 08:52

## 2021-06-05 RX ADMIN — GABAPENTIN 300 MG: 300 CAPSULE ORAL at 21:58

## 2021-06-05 RX ADMIN — GABAPENTIN 300 MG: 300 CAPSULE ORAL at 18:16

## 2021-06-05 RX ADMIN — Medication 800 UNITS: at 21:58

## 2021-06-05 RX ADMIN — FERROUS SULFATE TAB 325 MG (65 MG ELEMENTAL FE) 325 MG: 325 (65 FE) TAB at 08:54

## 2021-06-05 RX ADMIN — OXYCODONE HYDROCHLORIDE 10 MG: 10 TABLET ORAL at 03:52

## 2021-06-05 RX ADMIN — NYSTATIN: 100000 POWDER TOPICAL at 21:00

## 2021-06-05 RX ADMIN — GABAPENTIN 300 MG: 300 CAPSULE ORAL at 14:04

## 2021-06-05 RX ADMIN — Medication: at 08:56

## 2021-06-05 RX ADMIN — METFORMIN HYDROCHLORIDE 1000 MG: 500 TABLET ORAL at 08:51

## 2021-06-05 RX ADMIN — OXYCODONE HYDROCHLORIDE 10 MG: 10 TABLET ORAL at 16:44

## 2021-06-05 RX ADMIN — BUMETANIDE 1 MG: 0.25 INJECTION INTRAMUSCULAR; INTRAVENOUS at 21:58

## 2021-06-05 RX ADMIN — PIPERACILLIN AND TAZOBACTAM 3.38 G: 3; .375 INJECTION, POWDER, LYOPHILIZED, FOR SOLUTION INTRAVENOUS at 15:23

## 2021-06-05 NOTE — PROGRESS NOTES
Progress Note    Patient: Canelo Lopez MRN: [id number]  SSN: [id number]    YOB: 1955  Age: 76 y.o. Sex: male      Admit Date: 6/1/2021    LOS: 3 days     Subjective:   No acute events overnight    Objective:     Vitals:    06/04/21 0855 06/04/21 2037 06/04/21 2119 06/05/21 0104   BP:  (!) 141/72  107/62   Pulse:  86  80   Resp:  16  19   Temp:  98.7 °F (37.1 °C)  98.5 °F (36.9 °C)   SpO2: 97% 100%  99%   Weight:   (!) 172.5 kg (380 lb 6.4 oz)    Height:            Intake and Output:  Current Shift: 06/05 0701 - 06/05 1900  In: -   Out: 300 [Urine:300]  Last three shifts: 06/03 1901 - 06/05 0700  In: -   Out: 2940 [Urine:2940]    Physical Exam:   General:  Alert, cooperative, no distress, appears stated age. Eyes:  Conjunctivae/corneas clear. PERRL, EOMs intact. Fundi benign   Ears:  Normal TMs and external ear canals both ears. Nose: Nares normal. Septum midline. Mucosa normal. No drainage or sinus tenderness. Mouth/Throat: Lips, mucosa, and tongue normal. Teeth and gums normal.   Neck: Supple, symmetrical, trachea midline, no adenopathy, thyroid: no enlargment/tenderness/nodules, no carotid bruit and no JVD. Back:   Symmetric, no curvature. ROM normal. No CVA tenderness. Lungs:   Clear to auscultation bilaterally. Heart:  Regular rate and rhythm, S1, S2 normal, no murmur, click, rub or gallop. Abdomen:   Soft, non-tender. Bowel sounds normal. No masses,  No organomegaly. Extremities: Extremities normal, atraumatic, no cyanosis or edema. Pulses: 2+ and symmetric all extremities. Skin: Skin color, texture, turgor normal. No rashes or lesions   Lymph nodes: Cervical, supraclavicular, and axillary nodes normal.   Neurologic: CNII-XII intact. Normal strength, sensation and reflexes throughout. Lab/Data Review: All lab results for the last 24 hours reviewed.          Assessment:     Active Problems:    Cellulitis (6/1/2021)      Cellulitis and abscess of right leg (6/2/2021)    Mr. Oumou Malcolm is a 56-year-old white male with:  1. Heart failure with decompensation. 2.  Morbid obesity. 3.  Hypertension with hypertensive heart disease. 4.  Mixed hyperlipidemia  5. Diabetes mellitus with neuropathy. 6.  Venous insufficiency, status post ablation( Dr. Anthony Emanuel)  7. Obstructive sleep apnea, on CPAP machine. 8.  COPD  9. Gout. 10. SSS s/p DCPM 9/2019  11. Off BB due to easy fatigability. Plan:     Continue IV diuresis. Echocardiogram pending. Continue to monitor his kidney function closely.     Signed By: Luis Medeiros MD     June 5, 2021

## 2021-06-05 NOTE — PROGRESS NOTES
PROGRESS NOTE      Chief Complaints:  Patient examined this morning. HPI and  Objective:    Patient is wearing BiPAP. He has no new particular complaints. This morning patient both legs not has been elevated. Patient still has quite severe swelling both legs. Patient has no fever. Denies any chest pain shortness breath abdominal pain or nausea. Review of Systems:  Rest of review systems negative. EXAM:  Visit Vitals  /75 (BP 1 Location: Right arm, BP Patient Position: At rest)   Pulse 79   Temp 98.7 °F (37.1 °C)   Resp 20   Ht 5' 11\" (1.803 m)   Wt (!) 380 lb 6.4 oz (172.5 kg)   SpO2 98%   BMI 53.06 kg/m²     Patient is awake and alert  Head and neck atraumatic no cephalic  Cardiovascular regular rate  Pulmonary no audible wheeze  Abdomen soft  Neurological nonfocal  Both lower extremity examination not performed.       Recent Results (from the past 24 hour(s))   GLUCOSE, POC    Collection Time: 06/04/21  8:39 PM   Result Value Ref Range    Glucose (POC) 99 65 - 117 mg/dL    Performed by Vikki Porter    GLUCOSE, POC    Collection Time: 06/05/21  7:38 AM   Result Value Ref Range    Glucose (POC) 97 65 - 117 mg/dL    Performed by [address] [place] [address], POC    Collection Time: 06/05/21 11:21 AM   Result Value Ref Range    Glucose (POC) 106 65 - 117 mg/dL    Performed by [facility]    ECHO ADULT COMPLETE    Collection Time: 06/05/21 12:43 PM   Result Value Ref Range    Pulmonic Regurgitant End Max Velocity 93.50 cm/s    AoV PG 3.00 mmHg    Aortic Valve Area by Continuity of Peak Velocity 2.91 cm2    Ao Root D 4.30 cm    IVSd 0.94 0.60 - 1.00 cm    LVIDd 6.95 (A) 4.20 - 5.90 cm    LVIDs 6.18 cm    LVOT d 2.50 cm    Pulmonic Regurgitant End Max Velocity 55.40 cm/s    LVOT Peak Gradient 1.00 mmHg    LVPWd 1.02 (A) 0.60 - 1.00 cm    LV E' Septal Velocity 3.22 cm/s    LV ED Vol A2C 336.00 cm3    LV ES Vol A2C 236.00 cm3    E/E' septal 40.06     Left Atrium Major Axis 4.20 cm    Left Atrium Major Axis 4.20 cm Pulmonic Regurgitant End Max Velocity 260.00 cm/s    Mitral Valve Max Velocity 119.00 cm/s    MV Peak Gradient 6.00 mmHg    Mitral Valve E Wave Deceleration Time 180.00 ms    MV A Vitor 39.00 cm/s    MV E Vitor 129.00 cm/s    MV Mean Gradient 1.00 mmHg    Mitral Valve Annulus Velocity Time Integral 21.70 cm    MV E/A 3.31     Pulmonic Regurgitant End Max Velocity 70.90 cm/s    Pulmonic Valve Systolic Peak Instantaneous Gradient 2.00 mmHg    RVIDd 3.03 cm    Tricuspid Valve Max Velocity 290.00 cm/s    Triscuspid Valve Regurgitation Peak Gradient 34.00 mmHg    Tapse 1.40 (A) 1.50 - 2.00 cm    Right Atrial Area 4C 22.44 cm2    LA Area 4C 20.83 cm2    BP EF 23.1 55.0 - 100.0 %    LV Mass .8 88.0 - 224.0 g    LV Mass AL Index 111.8 49.0 - 115.0 g/m2    PRANEETH/BSA Pk Vitor 1.1 cm2/m2   CBC W/O DIFF    Collection Time: 06/05/21  3:02 PM   Result Value Ref Range    WBC 7.4 4.1 - 11.1 K/uL    RBC 4.02 (L) 4.10 - 5.70 M/uL    HGB 11.0 (L) 12.1 - 17.0 g/dL    HCT 37.4 36.6 - 50.3 %    MCV 93.0 80.0 - 99.0 FL    MCH 27.4 26.0 - 34.0 PG    MCHC 29.4 (L) 30.0 - 36.5 g/dL    RDW 14.8 (H) 11.5 - 14.5 %    PLATELET 291 097 - 816 K/uL    MPV 10.7 8.9 - 12.9 FL    NRBC 0.0 0.0  WBC    ABSOLUTE NRBC 0.00 0.00 - 5.39 K/uL   METABOLIC PANEL, COMPREHENSIVE    Collection Time: 06/05/21  3:02 PM   Result Value Ref Range    Sodium 142 136 - 145 mmol/L    Potassium 4.4 3.5 - 5.1 mmol/L    Chloride 108 97 - 108 mmol/L    CO2 24 21 - 32 mmol/L    Anion gap 10 5 - 15 mmol/L    Glucose 108 (H) 65 - 100 mg/dL    BUN 29 (H) 6 - 20 mg/dL    Creatinine 1.42 (H) 0.70 - 1.30 mg/dL    BUN/Creatinine ratio 20 12 - 20      GFR est AA >60 >60 ml/min/1.73m2    GFR est non-AA 50 (L) >60 ml/min/1.73m2    Calcium 8.9 8.5 - 10.1 mg/dL    Bilirubin, total 0.4 0.2 - 1.0 mg/dL    AST (SGOT) 32 15 - 37 U/L    ALT (SGPT) 43 12 - 78 U/L    Alk.  phosphatase 74 45 - 117 U/L    Protein, total 7.8 6.4 - 8.2 g/dL    Albumin 3.0 (L) 3.5 - 5.0 g/dL    Globulin 4.8 (H) 2.0 - 4.0 g/dL    A-G Ratio 0.6 (L) 1.1 - 2.2     GLUCOSE, POC    Collection Time: 06/05/21  3:56 PM   Result Value Ref Range    Glucose (POC) 100 65 - 117 mg/dL    Performed by Nathan Chapa        ASSESSMENT:   Patient is 76 y.o. with diagnosis of : Active Problems:    Cellulitis (6/1/2021)      Cellulitis and abscess of right leg (6/2/2021)        PLAN:                 Patient was advised strongly that he needs to have both legs elevated in order for these 2 wounds to heal.  Once the swelling is a relatively under control and the secondary edema and cellulitis resolved we were planning on Profore dressing both lower extremity. I have instructed nursing staff to keep both legs elevated 3 pillows at least if is tolerable.

## 2021-06-05 NOTE — PROGRESS NOTES
Problem: Mobility Impaired (Adult and Pediatric)  Goal: *Acute Goals and Plan of Care (Insert Text)  Description: Bed mobility with Ind within 7 days. Transfers with Ind within 7 days. Amb approx 5 ft with RW and SBA within 7 days. Ind with HEP for LE within 7 days. 6/5/2021 1110 by Maria Esther Enriquez, PT, DPT  Outcome: Not Met  6/5/2021 1109 by Maria Esther Enriquez PT, DPT  Outcome: Not Met   PHYSICAL THERAPY EVALUATION  Patient: Aaron George (89 y.o. male)  Date: 6/5/2021  Primary Diagnosis: Cellulitis [L03.90]  Cellulitis and abscess of right leg [L03.115, L02.415]        Precautions: BLE wounds      ASSESSMENT  Based on the objective data described below, the patient presents with Decr ROM, strength, bed mobility, transfers, balance, gait, activity tolerance, safety awareness, and functional mobility. Pt admitted 6/1/21 following chronic BLE wounds from wound care clinic admitted for multidisplinary management of his recalcitrant venous ulcers and cellulitis. Pmx: COPD, diabetes, gout, hypertension, arthritis, and sleep apnea, Trilogy machine at home anytime he is laying down (about 16 hours a day), venous ulcers on his lower legs bilaterally. Pt states they live with wife in 1 lvl home with ramp entrance. Pt reports Ind PLOF with dressing/bathing and Ind amb with SPC. Pt has a hospital bed,, quad cane, RW, and dressing aids. Pt agreeable to PT evaluation, A&O x 4 and reports 8-9/10 pain. RN notified and aware. Pt completed bed mobility with Sup, transfers with CGA, and amb 10 ft without AD and CGA; declined continued amb due to line management and BLE pain. Pt has fair activity tolerance. Pt educated on LE HEP to complete throughout the day to prevent decline. Pt would benefit from acute skilled PT services to address above deficits and progress towards goals. PT d/c recc HHPT when medically appropriate.     Current Level of Function Impacting Discharge (mobility/balance): decr activity tolerance, limited amb due to pain     Patient will benefit from skilled therapy intervention to address the above noted impairments. PLAN :  Recommendations and Planned Interventions: bed mobility training, transfer training, gait training, therapeutic exercises, neuromuscular re-education, patient and family training/education, and therapeutic activities      Frequency/Duration: Patient will be followed by physical therapy:  3 times a week to address goals. Recommendation for discharge: (in order for the patient to meet his/her long term goals)  PT d/c recc HHPT when medically appropriate. This discharge recommendation:  Has been made in collaboration with the attending provider and/or case management    IF patient discharges home will need the following DME: none         SUBJECTIVE:   Patient stated I have surgery later today.     OBJECTIVE DATA SUMMARY:   HISTORY:    Past Medical History:   Diagnosis Date    Arthritis     [facility]     Diabetes [facility]     Gout     Hypertension     Ill-defined condition     Sleep apnea      Past Surgical History:   Procedure Laterality Date    HX ORTHOPAEDIC      HX VEIN ABLATION ADHESIVE         Personal factors and/or comorbidities impacting plan of care: complicated medical history, multiple co morbidities    Home Situation  Home Environment: Private residence  Wheelchair Ramp: Yes  One/Two Story Residence: One story  Living Alone: No  Support Systems: Spouse/Significant Other/Partner, Family member(s)  Patient Expects to be Discharged to[de-identified] House  Current DME Used/Available at Home: Hospital bed, Quirino Rexford, straight, Walker, rolling=    EXAMINATION/PRESENTATION/DECISION MAKING:   Critical Behavior:  Neurologic State: Agitated, Appropriate for age  Orientation Level: Oriented X4   Hearing: Auditory  Auditory Impairment: None  Skin:  B lower leg wrapped   Range Of Motion:  AROM: Within functional limits   Strength:    Strength:  Within functional limits   Tone & Sensation:    Sensation: Intact   Functional Mobility:  Bed Mobility:  Rolling: Supervision  Supine to Sit: Supervision  Sit to Supine: Supervision  Scooting: Supervision  Transfers:  Sit to Stand: Contact guard assistance  Stand to Sit: Contact guard assistance      Balance:   Sitting: Intact  Standing: Intact   Gait:  Base of Support: Widened  Speed/Loraine: Pace decreased (<100feet/min), Shuffled, Slow  Gait Abnormalities: Decreased step clearance, path deviations, shuffling gait, trunk sway increased  Distance (ft) : 10   Assisted Device: no AD, gait belt  Assist Level: Contact guard assistance    Pt completed bed mobility with Sup, transfers with CGA, and amb 10 ft without AD and declined continued amb due to line management and BLE pain. Pt req cues for hand placement, sequencing, and safety throughout mobility session. Pt tolerated sitting EOB for approx 5 minutes. Pt has fair activity tolerance. Pt educated on LE HEP to complete throughout the day to prevent decline. Pt recovered in semisupine with all needs met  Functional Measure:  [facility] Mobility Inpatient Short Form  How much difficulty does the patient currently have. .. Unable A Lot A Little None   1. Turning over in bed (including adjusting bedclothes, sheets and blankets)? [] 1   [] 2   [] 3   [x] 4   2. Sitting down on and standing up from a chair with arms ( e.g., wheelchair, bedside commode, etc.)   [] 1   [] 2   [] 3   [x] 4   3. Moving from lying on back to sitting on the side of the bed? [] 1   [] 2   [] 3   [x] 4          How much help from another person does the patient currently need. .. Total A Lot A Little None   4. Moving to and from a bed to a chair (including a wheelchair)? [] 1   [] 2   [x] 3   [] 4   5. Need to walk in hospital room? [] 1   [] 2   [x] 3   [] 4   6. Climbing 3-5 steps with a railing?    [] 1   [] 2   [x] 3   [] 4   © 2007, Trustees of [facility] [address] [address], under license to [employer]. All rights reserved     Score:  Initial: 21 Most Recent: X (Date: -- )   Interpretation of Tool:  Represents activities that are increasingly more difficult (i.e. Bed mobility, Transfers, Gait). Score 24 23 22-20 19-15 14-10 9-7 6   Modifier CH CI CJ CK CL CM CN          Physical Therapy Evaluation Charge Determination   History Examination Presentation Decision-Making   HIGH Complexity :3+ comorbidities / personal factors will impact the outcome/ POC  MEDIUM Complexity : 3 Standardized tests and measures addressing body structure, function, activity limitation and / or participation in recreation  LOW Complexity : Stable, uncomplicated  Other Functional Measure [facility] 6 21      Based on the above components, the patient evaluation is determined to be of the following complexity level: LOW     Pain Ratin-9/10    Activity Tolerance:   Good  Please refer to the flowsheet for vital signs taken during this treatment. After treatment patient left in no apparent distress:   Supine in bed and Call bell within reach    COMMUNICATION/EDUCATION:   The patients plan of care was discussed with: Registered nurse. Fall prevention education was provided and the patient/caregiver indicated understanding. and Patient/family have participated as able in goal setting and plan of care.     Thank you for this referral.  Javier Riggs, PT, DPT   Time Calculation: 19 mins

## 2021-06-05 NOTE — PROGRESS NOTES
General Daily Progress Note          Patient Name:   Margareth Spear       YOB: 1955       Age:  76 y.o. Admit Date: 6/1/2021      Subjective:     Patient is a 76y.o. year old male with a history of COPD, diabetes, gout, hypertension, arthritis, and sleep apnea. Patient has venous ulcers on his lower legs bilaterally that were being treated by wound care for several months with no improvement. Wound care recommended admission to the hospital for multidisplinary management of his venous ulcers. He was seen in the ER 6/1 and admitted. He is alert, awake, and in no acute distress. He is still complaining of leg pain and some SOB with exertion. Denies chest pain. Patient would like some cream for his dry, itchy skin. Recently underwent venous doppler that showed:      1. Right leg deep vein and superficial vein system do not show acute venous thrombosis. 2.  Left leg deep vein system do not show acute venous thrombosis. 3.  However there is a focal area of the left mid greater saphenous vein with acute venous thrombosis. 4.  Otherwise bilateral leg deep vein system shows spontaneous, phasic, competent venous flow with augmentation.            Objective:     Visit Vitals  /75 (BP 1 Location: Right arm, BP Patient Position: At rest)   Pulse 79   Temp 98.7 °F (37.1 °C)   Resp 20   Ht 5' 11\" (1.803 m)   Wt (!) 172.5 kg (380 lb 6.4 oz)   SpO2 100%   BMI 53.06 kg/m²        Recent Results (from the past 24 hour(s))   GLUCOSE, POC    Collection Time: 06/04/21  3:42 PM   Result Value Ref Range    Glucose (POC) 114 65 - 117 mg/dL    Performed by Remberto Tijerina    GLUCOSE, POC    Collection Time: 06/04/21  8:39 PM   Result Value Ref Range    Glucose (POC) 99 65 - 117 mg/dL    Performed by Joan Wolf    GLUCOSE, POC    Collection Time: 06/05/21  7:38 AM   Result Value Ref Range    Glucose (POC) 97 65 - 117 mg/dL    Performed by [address] [place] [address] Avenue, POC    Collection Time: 06/05/21 11:21 AM   Result Value Ref Range    Glucose (POC) 106 65 - 117 mg/dL    Performed by [facility]    ECHO ADULT COMPLETE    Collection Time: 06/05/21 12:43 PM   Result Value Ref Range    Pulmonic Regurgitant End Max Velocity 93.50 cm/s    AoV PG 3.00 mmHg    Aortic Valve Area by Continuity of Peak Velocity 2.91 cm2    Ao Root D 4.30 cm    IVSd 0.94 0.60 - 1.00 cm    LVIDd 6.95 (A) 4.20 - 5.90 cm    LVIDs 6.18 cm    LVOT d 2.50 cm    Pulmonic Regurgitant End Max Velocity 55.40 cm/s    LVOT Peak Gradient 1.00 mmHg    LVPWd 1.02 (A) 0.60 - 1.00 cm    LV E' Septal Velocity 3.22 cm/s    LV ED Vol A2C 336.00 cm3    LV ES Vol A2C 236.00 cm3    E/E' septal 40.06     Left Atrium Major Axis 4.20 cm    Left Atrium Major Axis 4.20 cm    Pulmonic Regurgitant End Max Velocity 260.00 cm/s    Mitral Valve Max Velocity 119.00 cm/s    MV Peak Gradient 6.00 mmHg    Mitral Valve E Wave Deceleration Time 180.00 ms    MV A Vitor 39.00 cm/s    MV E Vitor 129.00 cm/s    MV Mean Gradient 1.00 mmHg    Mitral Valve Annulus Velocity Time Integral 21.70 cm    MV E/A 3.31     Pulmonic Regurgitant End Max Velocity 70.90 cm/s    Pulmonic Valve Systolic Peak Instantaneous Gradient 2.00 mmHg    RVIDd 3.03 cm    Tricuspid Valve Max Velocity 290.00 cm/s    Triscuspid Valve Regurgitation Peak Gradient 34.00 mmHg    Tapse 1.40 (A) 1.50 - 2.00 cm    Right Atrial Area 4C 22.44 cm2    LA Area 4C 20.83 cm2    BP EF 23.1 55.0 - 100.0 %    LV Mass .8 88.0 - 224.0 g    LV Mass AL Index 111.8 49.0 - 115.0 g/m2    PRANEETH/BSA Pk Vitor 1.1 cm2/m2     [unfilled]      Review of Systems    Constitutional: Negative for chills and fever. HENT: Negative. Eyes: Negative. Respiratory: SOB with exertion   Cardiovascular: Negative. Gastrointestinal: Negative for abdominal pain and nausea. Skin: Negative. Neurological: Negative. Physical Exam:      Constitutional: pt is oriented to person, place, and time. HENT:   Head: Normocephalic and atraumatic. Eyes: Pupils are equal, round, and reactive to light. EOM are normal.   Cardiovascular: Normal rate, regular rhythm and normal heart sounds. Pulmonary/Chest: Breath sounds normal. No wheezes. No rales. Exhibits no tenderness. Abdominal: Soft. Bowel sounds are normal. There is no abdominal tenderness. There is no rebound and no guarding. Musculoskeletal: Normal range of motion. Neurological: pt is alert and oriented to person, place, and time. Extremities: Venous ulcers R and L lateral lower legs with surrounding discoloration of lower extremities, currently covered with wound dressings    XR CHEST PORT   Final Result   Stable mild-to-moderate enlargement of cardiomediastinal silhouette. Mediastinum underpenetrated with suboptimal visualization of right atrial and   right ventricular pacing leads. There is obscuration of a portion of the left   hemithorax by the generator device. No pneumothorax. No evidence of pulmonary   edema, air space pneumonia, or pleural effusion.            Recent Results (from the past 24 hour(s))   GLUCOSE, POC    Collection Time: 06/04/21  3:42 PM   Result Value Ref Range    Glucose (POC) 114 65 - 117 mg/dL    Performed by Ayo Álvarez    GLUCOSE, POC    Collection Time: 06/04/21  8:39 PM   Result Value Ref Range    Glucose (POC) 99 65 - 117 mg/dL    Performed by Kelsey Cooper    GLUCOSE, POC    Collection Time: 06/05/21  7:38 AM   Result Value Ref Range    Glucose (POC) 97 65 - 117 mg/dL    Performed by [address] [place] [address], POC    Collection Time: 06/05/21 11:21 AM   Result Value Ref Range    Glucose (POC) 106 65 - 117 mg/dL    Performed by [facility]    ECHO ADULT COMPLETE    Collection Time: 06/05/21 12:43 PM   Result Value Ref Range    Pulmonic Regurgitant End Max Velocity 93.50 cm/s    AoV PG 3.00 mmHg    Aortic Valve Area by Continuity of Peak Velocity 2.91 cm2    Ao Root D 4.30 cm    IVSd 0.94 0.60 - 1.00 cm    LVIDd 6.95 (A) 4.20 - 5.90 cm    LVIDs 6.18 cm LVOT d 2.50 cm    Pulmonic Regurgitant End Max Velocity 55.40 cm/s    LVOT Peak Gradient 1.00 mmHg    LVPWd 1.02 (A) 0.60 - 1.00 cm    LV E' Septal Velocity 3.22 cm/s    LV ED Vol A2C 336.00 cm3    LV ES Vol A2C 236.00 cm3    E/E' septal 40.06     Left Atrium Major Axis 4.20 cm    Left Atrium Major Axis 4.20 cm    Pulmonic Regurgitant End Max Velocity 260.00 cm/s    Mitral Valve Max Velocity 119.00 cm/s    MV Peak Gradient 6.00 mmHg    Mitral Valve E Wave Deceleration Time 180.00 ms    MV A Vitor 39.00 cm/s    MV E Vitor 129.00 cm/s    MV Mean Gradient 1.00 mmHg    Mitral Valve Annulus Velocity Time Integral 21.70 cm    MV E/A 3.31     Pulmonic Regurgitant End Max Velocity 70.90 cm/s    Pulmonic Valve Systolic Peak Instantaneous Gradient 2.00 mmHg    RVIDd 3.03 cm    Tricuspid Valve Max Velocity 290.00 cm/s    Triscuspid Valve Regurgitation Peak Gradient 34.00 mmHg    Tapse 1.40 (A) 1.50 - 2.00 cm    Right Atrial Area 4C 22.44 cm2    LA Area 4C 20.83 cm2    BP EF 23.1 55.0 - 100.0 %    LV Mass .8 88.0 - 224.0 g    LV Mass AL Index 111.8 49.0 - 115.0 g/m2    PRANEETH/BSA Pk Vitor 1.1 cm2/m2       Results     Procedure Component Value Units Date/Time    CULTURE, BLOOD #1 [[id number]] Collected: 06/02/21 1231    Order Status: Completed Specimen: Blood Updated: 06/05/21 0805     Special Requests: No Special Requests        Culture result: No growth 2 days       CULTURE, BLOOD #2 [[id number]] Collected: 06/02/21 1231    Order Status: Completed Specimen: Blood Updated: 06/05/21 0805     Special Requests: No Special Requests        Culture result: No growth 2 days       CULTURE, BLOOD, PAIRED [[id number]] Collected: 06/01/21 2015    Order Status: Completed Specimen: Blood Updated: 06/05/21 0805     Special Requests: No Special Requests        Culture result: No growth 3 days              Labs:     Recent Labs     06/04/21  0612 06/03/21  0949   WBC 7.1 7.0   HGB 11.3* 10.7*   HCT 37.4 35.2*    263     Recent Labs 06/04/21  0612 06/03/21  0616 06/02/21  2148    137 138   K 3.7 3.8 3.9    106 106   CO2 29 24 25   BUN 28* 27* 30*   CREA 1.35* 1.34* 1.45*   * 94 132*   CA 8.9 9.1 8.9     Recent Labs     06/04/21  0612 06/03/21  0616   ALT 38 30   AP 65 65   TBILI 0.4 0.6   TP 7.6 7.9   ALB 2.8* 2.8*   GLOB 4.8* 5.1*     No results for input(s): INR, PTP, APTT, INREXT, INREXT in the last 72 hours. No results for input(s): FE, TIBC, PSAT, FERR in the last 72 hours. No results found for: FOL, RBCF   No results for input(s): PH, PCO2, PO2 in the last 72 hours. No results for input(s): CPK, CKNDX, TROIQ in the last 72 hours. No lab exists for component: CPKMB  No results found for: CHOL, CHOLX, CHLST, CHOLV, HDL, HDLP, LDL, LDLC, DLDLP, TGLX, TRIGL, TRIGP, CHHD, CHHDX  Lab Results   Component Value Date/Time    Glucose (POC) 106 06/05/2021 11:21 AM    Glucose (POC) 97 06/05/2021 07:38 AM    Glucose (POC) 99 06/04/2021 08:39 PM    Glucose (POC) 114 06/04/2021 03:42 PM    Glucose (POC) 104 06/04/2021 11:31 AM     No results found for: COLOR, APPRN, SPGRU, REFSG, KENTON, PROTU, GLUCU, KETU, BILU, UROU, DELMY, LEUKU, GLUKE, EPSU, BACTU, WBCU, RBCU, CASTS, UCRY      Assessment:        Acute cellulitis of both legs  Venous stasis ulcer  Chronic hypoxemic respiratory failure  History of CHF diastolic dysfunction  COPD  Type 2 diabetes  Hypertension  Gout  Arthritis   Sleep Apnea   Morbid obesity  Superficial Thrombosis of left greater saphenous vein            Plan:       Repeat BMP in AM.   Check echo. Continue IV diuresis. Stop HCTZ and switch to Bumex IV 1 mg twice a day  Continue aspirin  Monitor kidney function  Stop colchicine due to abnormal kidney function. Daily weights and ins and outs   Replete potassium-3.7. Per ID:   Continue vancomycin and zosyn pending repeat wound cultures. Restart home dose of Gabapentin. Leg elevation to help with venous return. Routine labs in morning.      Continue Trilogy. Continue nystatin for yeast infection in groin area.      2D echo for today  Today's labs are pending    PT OT consult            Current Facility-Administered Medications:     vitamin E acetate capsule 800 Units, 800 Units, Oral, BID, Mary Patel MD    perflutren lipid microspheres (DEFINITY) 1.1 mg/mL contrast injection, , , ,     ammonium lactate (AMLACTIN) 12 % cream, , Topical, PRN, Moni Patel MD, Given at 06/05/21 0856    insulin glargine (LANTUS) injection 32 Units, 32 Units, SubCUTAneous, QHS, Darren Maurer MD, 32 Units at 06/04/21 2121    insulin glargine (LANTUS) injection 80 Units, 80 Units, SubCUTAneous, DAILY, Darren Maurer MD, 80 Units at 06/05/21 0854    nystatin (MYCOSTATIN) 100,000 unit/gram powder, , Topical, BID, Moni Patel MD, Given at 06/05/21 0855    Saccharomyces boulardii (FLORASTOR) capsule 250 mg, 250 mg, Oral, BID, Moni Patel MD, 250 mg at 06/05/21 0851    colchicine tablet 0.6 mg, 0.6 mg, Oral, DAILY, Samson, Ananya Castellanos MD, 0.6 mg at 06/05/21 0851    piperacillin-tazobactam (ZOSYN) 3.375 g in 0.9% sodium chloride (MBP/ADV) 100 mL MBP, 3.375 g, IntraVENous, Q8H, Moni Patel MD, Last Rate: 25 mL/hr at 06/05/21 0904, 3.375 g at 06/05/21 6340    glucose chewable tablet 16 g, 4 Tablet, Oral, PRN, Moni Patel MD    dextrose (D50W) injection syrg 12.5-25 g, 25-50 mL, IntraVENous, PRN, Mary Patel MD    glucagon (GLUCAGEN) injection 1 mg, 1 mg, IntraMUSCular, PRN, Mary Patel MD    albuterol (PROVENTIL HFA, VENTOLIN HFA, PROAIR HFA) inhaler 2 Puff, 2 Puff, Inhalation, Q6H PRN, Moni Patel MD    ascorbic acid (vitamin C) (VITAMIN C) tablet 1,000 mg, 1,000 mg, Oral, BID, Moni Patel MD, 1,000 mg at 06/05/21 1720    aspirin chewable tablet 81 mg, 81 mg, Oral, DAILY, Moni Patel MD, 81 mg at 06/05/21 5989    ferrous sulfate tablet 325 mg, 1 Tablet, Oral, ACB, Moni Patel MD, 325 mg at 06/05/21 7098    fluticasone propionate (FLONASE) 50 mcg/actuation nasal spray 2 Spray, 2 Spray, Both Nostrils, DAILY, Moni Patel MD, 2 [place] at 06/05/21 0855    gabapentin (NEURONTIN) capsule 300 mg, 300 mg, Oral, QID, Edgard Patel MD, 300 mg at 06/05/21 0851    metFORMIN (GLUCOPHAGE) tablet 1,000 mg, 1,000 mg, Oral, BID WITH MEALS, Edgard Patel MD, 1,000 mg at 06/05/21 0073    multivitamin (ONE A DAY) tablet 1 Tablet, 1 Tablet, Oral, DAILY, Moni Patel MD, 1 Tablet at 06/05/21 1569    tamsulosin (FLOMAX) capsule 0.4 mg, 0.4 mg, Oral, DAILY, Moni Patel MD, 0.4 mg at 06/05/21 1953    tiotropium bromide (SPIRIVA RESPIMAT) 2.5 mcg /actuation, 5 mcg, Inhalation, DAILY, Moni Patel MD, 2 Puff at 06/04/21 0855    insulin lispro (HUMALOG) injection, , SubCUTAneous, AC&HS, Moni Patel MD, 3 Units at 06/03/21 1000    acetaminophen (TYLENOL) tablet 650 mg, 650 mg, Oral, Q6H PRN **OR** acetaminophen (TYLENOL) suppository 650 mg, 650 mg, Rectal, Q6H PRN, Edgard Patel MD    polyethylene glycol (MIRALAX) packet 17 g, 17 g, Oral, DAILY PRN, Edgard Patel MD    promethazine (PHENERGAN) tablet 12.5 mg, 12.5 mg, Oral, Q6H PRN **OR** ondansetron (ZOFRAN) injection 4 mg, 4 mg, IntraVENous, Q6H PRN, Moni Patel MD    oxyCODONE IR (ROXICODONE) tablet 10 mg, 10 mg, Oral, Q6H PRN, Moni Patel MD, 10 mg at 06/05/21 1030    bumetanide (BUMEX) injection 1 mg, 1 mg, IntraVENous, BID, Kari Spann MD, 1 mg at 06/05/21 7820

## 2021-06-05 NOTE — PROGRESS NOTES
PULMONARY NOTE  [facility]    Name: Walter Moritz MRN: [id number]   : 1955 Hospital: [facility]   Date: 2021  Admission date: 2021 Hospital Day: 5       HPI:     Hospital Problems  Date Reviewed: 2021        Codes Class Noted POA    Cellulitis and abscess of right leg ICD-10-CM: L03.115, L02.415  ICD-9-CM: 682.6  2021 Unknown        Cellulitis ICD-10-CM: L03.90  ICD-9-CM: 682.9  2021 Unknown                   [x] High complexity decision making was performed  [x] See my orders for details      Subjective/Initial History:     I was asked by Avila Robert MD to see Walter Moritz  a 76 y.o.  male in consultation     Excerpts from admission 2021 or consult notes as follows:   59-year-old morbidly obese gentleman came in because of leg pain and swelling he has history of venous stasis leg edema diabetes mellitus, significant past medical history of COPD chronic hypoxic hypercapnic respiratory failure on trilogy noninvasive ventilator at home he was seen by me about a month ago he was doing fairly well he was compliant with his trilogy ventilator came in because of swelling of the lower extremities his BNP was elevated.        Allergies   Allergen Reactions    Elavil Unknown (comments)    Sulfa (Sulfonamide Antibiotics) Nausea and Vomiting        MAR reviewed and pertinent medications noted or modified as needed     Current Facility-Administered Medications   Medication    ammonium lactate (AMLACTIN) 12 % cream    insulin glargine (LANTUS) injection 32 Units    insulin glargine (LANTUS) injection 80 Units    nystatin (MYCOSTATIN) 100,000 unit/gram powder    Saccharomyces boulardii (FLORASTOR) capsule 250 mg    colchicine tablet 0.6 mg    piperacillin-tazobactam (ZOSYN) 3.375 g in 0.9% sodium chloride (MBP/ADV) 100 mL MBP    glucose chewable tablet 16 g    dextrose (D50W) injection syrg 12.5-25 g    glucagon (GLUCAGEN) injection 1 mg    albuterol (PROVENTIL HFA, VENTOLIN HFA, PROAIR HFA) inhaler 2 Puff    ascorbic acid (vitamin C) (VITAMIN C) tablet 1,000 mg    aspirin chewable tablet 81 mg    ferrous sulfate tablet 325 mg    fluticasone propionate (FLONASE) 50 mcg/actuation nasal spray 2 Spray    gabapentin (NEURONTIN) capsule 300 mg    metFORMIN (GLUCOPHAGE) tablet 1,000 mg    multivitamin (ONE A DAY) tablet 1 Tablet    tamsulosin (FLOMAX) capsule 0.4 mg    tiotropium bromide (SPIRIVA RESPIMAT) 2.5 mcg /actuation    insulin lispro (HUMALOG) injection    acetaminophen (TYLENOL) tablet 650 mg    Or    acetaminophen (TYLENOL) suppository 650 mg    polyethylene glycol (MIRALAX) packet 17 g    promethazine (PHENERGAN) tablet 12.5 mg    Or    ondansetron (ZOFRAN) injection 4 mg    oxyCODONE IR (ROXICODONE) tablet 10 mg    vitamin e (E GEMS) capsule 800 Units    bumetanide (BUMEX) injection 1 mg      Patient PCP: Denver Macadam, MD  PMH:  has a past medical history of Arthritis, Chronic obstructive pulmonary disease ([facility] [address] Diabetes ([facility] [address] Gout, Hypertension, Ill-defined condition, and Sleep apnea. PSH:   has a past surgical history that includes hx orthopaedic and hx vein ablation adhesive. FHX: family history includes Diabetes in his brother, mother, and sister; Heart Disease in his father and mother; Hypertension in his father and mother; Kidney Disease in his mother. SHX:  reports that he has never smoked. He has never used smokeless tobacco. He reports current alcohol use. He reports that he does not use drugs. ROS:    Review of Systems   Constitutional: Positive for malaise/fatigue. HENT: Negative. Eyes: Negative. Respiratory: Positive for shortness of breath. Cardiovascular: Positive for orthopnea and claudication. Gastrointestinal: Negative. Genitourinary: Negative. Musculoskeletal:        Bilateral leg edema   Skin: Negative. Neurological: Negative. Psychiatric/Behavioral: Negative. Objective:     Vital Signs: Telemetry:    normal sinus rhythm Intake/Output:   Visit Vitals  /75 (BP 1 Location: Right arm, BP Patient Position: At rest)   Pulse 79   Temp 98.7 °F (37.1 °C)   Resp 20   Ht 5' 11\" (1.803 m)   Wt (!) 172.5 kg (380 lb 6.4 oz)   SpO2 100%   BMI 53.06 kg/m²       Temp (24hrs), Av.6 °F (37 °C), Min:98.5 °F (36.9 °C), Max:98.7 °F (37.1 °C)        O2 Device: Nasal cannula O2 Flow Rate (L/min): 3 l/min       Wt Readings from Last 4 Encounters:   21 (!) 172.5 kg (380 lb 6.4 oz)   21 136.1 kg (300 lb)   20 (!) 166.5 kg (367 lb)          Intake/Output Summary (Last 24 hours) at 2021 0915  Last data filed at 2021 0745  Gross per 24 hour   Intake --   Output 2440 ml   Net -2440 ml       Last shift:       07 -  190  In: -   Out: 300 [Urine:300]  Last 3 shifts:  190 -  0700  In: -   Out: 0492 [Urine:2940]       Physical Exam:     Physical Exam  Constitutional:       Appearance: He is obese. HENT:      Head: Normocephalic and atraumatic. Nose: Nose normal.      Mouth/Throat:      Mouth: Mucous membranes are dry. Eyes:      Pupils: Pupils are equal, round, and reactive to light. Cardiovascular:      Rate and Rhythm: Normal rate and regular rhythm. Pulses: Normal pulses. Heart sounds: Normal heart sounds. Pulmonary:      Effort: Respiratory distress present. Breath sounds: Rales present. Abdominal:      General: Abdomen is flat. Bowel sounds are normal.      Palpations: Abdomen is soft. Musculoskeletal:         General: Swelling present. Cervical back: Normal range of motion and neck supple. Right lower leg: Edema present. Left lower leg: Edema present. Comments: Erythema tenderness on deep palpation  bilateral lower extremities   Neurological:      Mental Status: He is alert.           Labs:    Recent Labs     21  0612 21  0949   WBC 7.1 7.0   HGB 11.3* 10.7*    263 Recent Labs     06/04/21  0612 06/03/21  0616 06/02/21  2148    137 138   K 3.7 3.8 3.9    106 106   CO2 29 24 25   * 94 132*   BUN 28* 27* 30*   CREA 1.35* 1.34* 1.45*   CA 8.9 9.1 8.9   ALB 2.8* 2.8*  --    ALT 38 30  --      No results for input(s): PH, PCO2, PO2, HCO3, FIO2 in the last 72 hours. No results for input(s): CPK, CKNDX, TROIQ in the last 72 hours. No lab exists for component: CPKMB  No results found for: BNPP, BNP   Lab Results   Component Value Date/Time    Culture result: No growth 2 days 06/02/2021 12:31 PM    Culture result: No growth 2 days 06/02/2021 12:31 PM    Culture result: No growth 3 days 06/01/2021 08:15 PM   No results found for: TSH, TSHEXT, TSHEXT    Imaging:    CXR Results  (Last 48 hours)    None        Results from [place] encounter on 06/01/21    XR CHEST PORT    Narrative  Portable chest, 1949 hours. Comparison with 6/22/2020. Impression  Stable mild-to-moderate enlargement of cardiomediastinal silhouette. Mediastinum underpenetrated with suboptimal visualization of right atrial and  right ventricular pacing leads. There is obscuration of a portion of the left  hemithorax by the generator device. No pneumothorax. No evidence of pulmonary  edema, air space pneumonia, or pleural effusion. No results found for this or any previous visit. IMPRESSION:   1. Acute on chronic hypoxic hypercapnic respiratory failure  2. Chronic Obstructive Pulmonary Disease   3. Venous stasis ulcer with leg wound chronic leg edema possible cellulitis  4. CHF with diastolic dysfunction  5. Obstructive sleep apnea  6. Obesity gout hypertension type 2 diabetes  7. Acute kidney injury      RECOMMENDATIONS/PLAN:     1. Trilogy for non invasive ventilatory life support to prevent worsening respiratory acidosis  2. Patient is not actively wheezing no need for any systemic steroids  3. Continue with Bumex  4.  Patient is on Siobhan Sawyer MD

## 2021-06-05 NOTE — PROGRESS NOTES
Bedside and Verbal shift change report given to Ariadna Tolbert RN (oncoming nurse) by Darvin Marroquin RN (offgoing nurse). Report included the following information SBAR and MAR.

## 2021-06-05 NOTE — PROGRESS NOTES
PT orders received and PT eval completed. Pt awaiting surgery later this afternoon, will need new PT orders for re-assessment following surgery. Thank you

## 2021-06-05 NOTE — PROGRESS NOTES
Physician Progress Note      Bernarda Howard  CSN #:                  [id number]  :                       1955  ADMIT DATE:       2021 6:56 PM  [address] [facility] DATE:  RESPONDING  PROVIDER #:        Aiden Iyer MD          QUERY TEXT:    Dear Dr. Warden Jones -    Pt admitted with BLE cellulitis. Pt noted to have DM type 2. If possible, please document in progress notes and discharge summary the relationship, if any, between cellulitis and DM. The medical record reflects the following:  Risk Factors: 76 M admitted from wound center to BLE cellulitis, venous ulcers to BLE, DM. HTN, gout  Clinical Indicators: patient with DM type 2 with blood glucose 169-186; noted with BLE venous ulcers; legs red and swollen, cellulitis  Treatment: labs, IV ABT, wound care consult, ID and Vascular consults    Thank you,  Griselda [place], BSN, RN, [facility]  Clinical   [phone]  Options provided:  -- BLE cellulitis associated with Diabetes  -- BLE cellulitis unrelated to Diabetes  -- Other - I will add my own diagnosis  -- Disagree - Not applicable / Not valid  -- Disagree - Clinically unable to determine / Unknown  -- Refer to Clinical Documentation Reviewer    PROVIDER RESPONSE TEXT:    BLE cellulitis associated with Diabetes.     Query created by: Triston Jean Baptiste on 2021 1:21 PM      Electronically signed by:  Aiden Iyer MD 2021 9:33 AM

## 2021-06-06 LAB
ALBUMIN SERPL-MCNC: 3 G/DL (ref 3.5–5)
ALBUMIN/GLOB SERPL: 0.6 {RATIO} (ref 1.1–2.2)
ALP SERPL-CCNC: 73 U/L (ref 45–117)
ALT SERPL-CCNC: 42 U/L (ref 12–78)
ANION GAP SERPL CALC-SCNC: 5 MMOL/L (ref 5–15)
AST SERPL W P-5'-P-CCNC: 28 U/L (ref 15–37)
BASOPHILS # BLD: 0 K/UL (ref 0–0.1)
BASOPHILS NFR BLD: 0 % (ref 0–1)
BILIRUB SERPL-MCNC: 0.4 MG/DL (ref 0.2–1)
BUN SERPL-MCNC: 26 MG/DL (ref 6–20)
BUN/CREAT SERPL: 19 (ref 12–20)
CA-I BLD-MCNC: 9.1 MG/DL (ref 8.5–10.1)
CHLORIDE SERPL-SCNC: 106 MMOL/L (ref 97–108)
CO2 SERPL-SCNC: 28 MMOL/L (ref 21–32)
CREAT SERPL-MCNC: 1.34 MG/DL (ref 0.7–1.3)
DIFFERENTIAL METHOD BLD: ABNORMAL
ECHO AO ROOT DIAM: 4.3 CM
ECHO AV AREA PEAK VELOCITY: 2.91 CM2
ECHO AV AREA/BSA PEAK VELOCITY: 1.1 CM2/M2
ECHO AV PEAK GRADIENT: 3 MMHG
ECHO LA AREA 4C: 20.83 CM2
ECHO LA MAJOR AXIS: 4.2 CM
ECHO LA MAJOR AXIS: 4.2 CM
ECHO LV E' SEPTAL VELOCITY: 3.22 CM/S
ECHO LV EDV A2C: 336 CM3
ECHO LV EJECTION FRACTION BIPLANE: 23.1 % (ref 55–100)
ECHO LV ESV A2C: 236 CM3
ECHO LV INTERNAL DIMENSION DIASTOLIC: 6.95 CM (ref 4.2–5.9)
ECHO LV INTERNAL DIMENSION SYSTOLIC: 6.18 CM
ECHO LV IVSD: 0.94 CM (ref 0.6–1)
ECHO LV MASS 2D: 309.8 G (ref 88–224)
ECHO LV MASS INDEX 2D: 111.8 G/M2 (ref 49–115)
ECHO LV POSTERIOR WALL DIASTOLIC: 1.02 CM (ref 0.6–1)
ECHO LVOT DIAM: 2.5 CM
ECHO LVOT PEAK GRADIENT: 1 MMHG
ECHO MV A VELOCITY: 39 CM/S
ECHO MV E DECELERATION TIME (DT): 180 MS
ECHO MV E VELOCITY: 129 CM/S
ECHO MV E/A RATIO: 3.31
ECHO MV E/E' SEPTAL: 40.06
ECHO MV MAX VELOCITY: 119 CM/S
ECHO MV MEAN GRADIENT: 1 MMHG
ECHO MV PEAK GRADIENT: 6 MMHG
ECHO MV VTI: 21.7 CM
ECHO PV PEAK INSTANTANEOUS GRADIENT SYSTOLIC: 2 MMHG
ECHO PV REGURGITANT MAX VELOCITY: 260 CM/S
ECHO PV REGURGITANT MAX VELOCITY: 55.4 CM/S
ECHO PV REGURGITANT MAX VELOCITY: 70.9 CM/S
ECHO PV REGURGITANT MAX VELOCITY: 93.5 CM/S
ECHO RA AREA 4C: 22.44 CM2
ECHO RV INTERNAL DIMENSION: 3.03 CM
ECHO RV TAPSE: 1.4 CM (ref 1.5–2)
ECHO TV MAX VELOCITY: 290 CM/S
ECHO TV REGURGITANT PEAK GRADIENT: 34 MMHG
EOSINOPHIL # BLD: 0.4 K/UL (ref 0–0.4)
EOSINOPHIL NFR BLD: 5 % (ref 0–7)
ERYTHROCYTE [DISTWIDTH] IN BLOOD BY AUTOMATED COUNT: 15 % (ref 11.5–14.5)
GLOBULIN SER CALC-MCNC: 5 G/DL (ref 2–4)
GLUCOSE BLD STRIP.AUTO-MCNC: 101 MG/DL (ref 65–117)
GLUCOSE BLD STRIP.AUTO-MCNC: 128 MG/DL (ref 65–117)
GLUCOSE BLD STRIP.AUTO-MCNC: 152 MG/DL (ref 65–117)
GLUCOSE BLD STRIP.AUTO-MCNC: 153 MG/DL (ref 65–117)
GLUCOSE SERPL-MCNC: 157 MG/DL (ref 65–100)
HCT VFR BLD AUTO: 38.4 % (ref 36.6–50.3)
HGB BLD-MCNC: 11.4 G/DL (ref 12.1–17)
IMM GRANULOCYTES # BLD AUTO: 0 K/UL (ref 0–0.04)
IMM GRANULOCYTES NFR BLD AUTO: 0 % (ref 0–0.5)
LYMPHOCYTES # BLD: 1.3 K/UL (ref 0.8–3.5)
LYMPHOCYTES NFR BLD: 19 % (ref 12–49)
MCH RBC QN AUTO: 28 PG (ref 26–34)
MCHC RBC AUTO-ENTMCNC: 29.7 G/DL (ref 30–36.5)
MCV RBC AUTO: 94.3 FL (ref 80–99)
MONOCYTES # BLD: 0.5 K/UL (ref 0–1)
MONOCYTES NFR BLD: 7 % (ref 5–13)
NEUTS SEG # BLD: 4.5 K/UL (ref 1.8–8)
NEUTS SEG NFR BLD: 69 % (ref 32–75)
NRBC # BLD: 0 K/UL (ref 0–0.01)
NRBC BLD-RTO: 0 PER 100 WBC
PERFORMED BY, TECHID: ABNORMAL
PERFORMED BY, TECHID: NORMAL
PLATELET # BLD AUTO: 323 K/UL (ref 150–400)
PMV BLD AUTO: 10.9 FL (ref 8.9–12.9)
POTASSIUM SERPL-SCNC: 4.1 MMOL/L (ref 3.5–5.1)
PROT SERPL-MCNC: 8 G/DL (ref 6.4–8.2)
RBC # BLD AUTO: 4.07 M/UL (ref 4.1–5.7)
SODIUM SERPL-SCNC: 139 MMOL/L (ref 136–145)
WBC # BLD AUTO: 6.7 K/UL (ref 4.1–11.1)

## 2021-06-06 PROCEDURE — 77010033678 HC OXYGEN DAILY

## 2021-06-06 PROCEDURE — 74011250637 HC RX REV CODE- 250/637

## 2021-06-06 PROCEDURE — 80053 COMPREHEN METABOLIC PANEL: CPT

## 2021-06-06 PROCEDURE — 74011636637 HC RX REV CODE- 636/637: Performed by: FAMILY MEDICINE

## 2021-06-06 PROCEDURE — 97530 THERAPEUTIC ACTIVITIES: CPT

## 2021-06-06 PROCEDURE — 74011250637 HC RX REV CODE- 250/637: Performed by: SURGERY

## 2021-06-06 PROCEDURE — 36415 COLL VENOUS BLD VENIPUNCTURE: CPT

## 2021-06-06 PROCEDURE — 74011000250 HC RX REV CODE- 250: Performed by: INTERNAL MEDICINE

## 2021-06-06 PROCEDURE — 94640 AIRWAY INHALATION TREATMENT: CPT

## 2021-06-06 PROCEDURE — 65270000029 HC RM PRIVATE

## 2021-06-06 PROCEDURE — 74011250636 HC RX REV CODE- 250/636: Performed by: FAMILY MEDICINE

## 2021-06-06 PROCEDURE — 74011000258 HC RX REV CODE- 258: Performed by: FAMILY MEDICINE

## 2021-06-06 PROCEDURE — 99232 SBSQ HOSP IP/OBS MODERATE 35: CPT | Performed by: SURGERY

## 2021-06-06 PROCEDURE — 74011250637 HC RX REV CODE- 250/637: Performed by: FAMILY MEDICINE

## 2021-06-06 PROCEDURE — 94760 N-INVAS EAR/PLS OXIMETRY 1: CPT

## 2021-06-06 PROCEDURE — 82962 GLUCOSE BLOOD TEST: CPT

## 2021-06-06 PROCEDURE — 85025 COMPLETE CBC W/AUTO DIFF WBC: CPT

## 2021-06-06 PROCEDURE — 97165 OT EVAL LOW COMPLEX 30 MIN: CPT

## 2021-06-06 PROCEDURE — 74011636637 HC RX REV CODE- 636/637: Performed by: INTERNAL MEDICINE

## 2021-06-06 RX ADMIN — BUMETANIDE 1 MG: 0.25 INJECTION INTRAMUSCULAR; INTRAVENOUS at 21:39

## 2021-06-06 RX ADMIN — INSULIN GLARGINE 80 UNITS: 100 INJECTION, SOLUTION SUBCUTANEOUS at 09:50

## 2021-06-06 RX ADMIN — GABAPENTIN 300 MG: 300 CAPSULE ORAL at 09:50

## 2021-06-06 RX ADMIN — NYSTATIN: 100000 POWDER TOPICAL at 09:51

## 2021-06-06 RX ADMIN — OXYCODONE HYDROCHLORIDE 10 MG: 10 TABLET ORAL at 04:37

## 2021-06-06 RX ADMIN — Medication 250 MG: at 10:15

## 2021-06-06 RX ADMIN — Medication 800 UNITS: at 21:40

## 2021-06-06 RX ADMIN — BUMETANIDE 1 MG: 0.25 INJECTION INTRAMUSCULAR; INTRAVENOUS at 11:20

## 2021-06-06 RX ADMIN — OXYCODONE HYDROCHLORIDE 10 MG: 10 TABLET ORAL at 23:34

## 2021-06-06 RX ADMIN — OXYCODONE HYDROCHLORIDE AND ACETAMINOPHEN 1000 MG: 500 TABLET ORAL at 09:50

## 2021-06-06 RX ADMIN — OXYCODONE HYDROCHLORIDE 10 MG: 10 TABLET ORAL at 17:29

## 2021-06-06 RX ADMIN — NYSTATIN: 100000 POWDER TOPICAL at 21:00

## 2021-06-06 RX ADMIN — INSULIN LISPRO 3 UNITS: 100 INJECTION, SOLUTION INTRAVENOUS; SUBCUTANEOUS at 09:50

## 2021-06-06 RX ADMIN — Medication 250 MG: at 21:40

## 2021-06-06 RX ADMIN — PIPERACILLIN AND TAZOBACTAM 3.38 G: 3; .375 INJECTION, POWDER, LYOPHILIZED, FOR SOLUTION INTRAVENOUS at 09:00

## 2021-06-06 RX ADMIN — Medication: at 09:51

## 2021-06-06 RX ADMIN — GABAPENTIN 300 MG: 300 CAPSULE ORAL at 21:40

## 2021-06-06 RX ADMIN — GABAPENTIN 300 MG: 300 CAPSULE ORAL at 14:24

## 2021-06-06 RX ADMIN — FERROUS SULFATE TAB 325 MG (65 MG ELEMENTAL FE) 325 MG: 325 (65 FE) TAB at 09:50

## 2021-06-06 RX ADMIN — ASPIRIN 81 MG: 81 TABLET, CHEWABLE ORAL at 09:50

## 2021-06-06 RX ADMIN — COLCHICINE 0.6 MG: 0.6 TABLET, FILM COATED ORAL at 10:15

## 2021-06-06 RX ADMIN — PIPERACILLIN AND TAZOBACTAM 3.38 G: 3; .375 INJECTION, POWDER, LYOPHILIZED, FOR SOLUTION INTRAVENOUS at 21:39

## 2021-06-06 RX ADMIN — MULTIVITAMIN TABLET 1 TABLET: TABLET at 09:50

## 2021-06-06 RX ADMIN — INSULIN LISPRO 3 UNITS: 100 INJECTION, SOLUTION INTRAVENOUS; SUBCUTANEOUS at 13:24

## 2021-06-06 RX ADMIN — PIPERACILLIN AND TAZOBACTAM 3.38 G: 3; .375 INJECTION, POWDER, LYOPHILIZED, FOR SOLUTION INTRAVENOUS at 14:25

## 2021-06-06 RX ADMIN — INSULIN GLARGINE 32 UNITS: 100 INJECTION, SOLUTION SUBCUTANEOUS at 21:39

## 2021-06-06 RX ADMIN — GABAPENTIN 300 MG: 300 CAPSULE ORAL at 17:29

## 2021-06-06 RX ADMIN — Medication 800 UNITS: at 09:50

## 2021-06-06 RX ADMIN — OXYCODONE HYDROCHLORIDE 10 MG: 10 TABLET ORAL at 10:15

## 2021-06-06 RX ADMIN — FLUTICASONE PROPIONATE 2 SPRAY: 50 SPRAY, METERED NASAL at 09:52

## 2021-06-06 RX ADMIN — TAMSULOSIN HYDROCHLORIDE 0.4 MG: 0.4 CAPSULE ORAL at 09:50

## 2021-06-06 RX ADMIN — OXYCODONE HYDROCHLORIDE AND ACETAMINOPHEN 1000 MG: 500 TABLET ORAL at 21:40

## 2021-06-06 NOTE — PROGRESS NOTES
Bedside and Verbal shift change report given to Joe Lockwood RN (oncoming nurse) by Isaias Everett RN (offgoing nurse). Report included the following information SBAR and MAR.

## 2021-06-06 NOTE — PROGRESS NOTES
General Daily Progress Note          Patient Name:   Stefan Metzger       YOB: 1955       Age:  76 y.o. Admit Date: 6/1/2021      Subjective:     Patient is a 76y.o. year old male with a history of COPD, diabetes, gout, hypertension, arthritis, and sleep apnea. Patient has venous ulcers on his lower legs bilaterally that were being treated by wound care for several months with no improvement. Wound care recommended admission to the hospital for multidisplinary management of his venous ulcers. He was seen in the ER 6/1 and admitted. He is alert, awake, and in no acute distress. He is still complaining of leg pain and some SOB with exertion. Denies chest pain. Patient would like some cream for his dry, itchy skin. Recently underwent venous doppler that showed:      1. Right leg deep vein and superficial vein system do not show acute venous thrombosis. 2.  Left leg deep vein system do not show acute venous thrombosis. 3.  However there is a focal area of the left mid greater saphenous vein with acute venous thrombosis. 4.  Otherwise bilateral leg deep vein system shows spontaneous, phasic, competent venous flow with augmentation.            Objective:     Visit Vitals  /70 (BP 1 Location: Right arm, BP Patient Position: At rest)   Pulse 91   Temp 97.9 °F (36.6 °C)   Resp 20   Ht 5' 11\" (1.803 m)   Wt (!) 172.6 kg (380 lb 6.5 oz)   SpO2 99%   BMI 53.06 kg/m²        Recent Results (from the past 24 hour(s))   GLUCOSE, POC    Collection Time: 06/05/21 11:21 AM   Result Value Ref Range    Glucose (POC) 106 65 - 117 mg/dL    Performed by [facility]    ECHO ADULT COMPLETE    Collection Time: 06/05/21 12:43 PM   Result Value Ref Range    Pulmonic Regurgitant End Max Velocity 93.50 cm/s    AoV PG 3.00 mmHg    Aortic Valve Area by Continuity of Peak Velocity 2.91 cm2    Ao Root D 4.30 cm    IVSd 0.94 0.60 - 1.00 cm    LVIDd 6.95 (A) 4.20 - 5.90 cm    LVIDs 6.18 cm    LVOT d 2.50 cm Pulmonic Regurgitant End Max Velocity 55.40 cm/s    LVOT Peak Gradient 1.00 mmHg    LVPWd 1.02 (A) 0.60 - 1.00 cm    LV E' Septal Velocity 3.22 cm/s    LV ED Vol A2C 336.00 cm3    LV ES Vol A2C 236.00 cm3    E/E' septal 40.06     Left Atrium Major Axis 4.20 cm    Left Atrium Major Axis 4.20 cm    Pulmonic Regurgitant End Max Velocity 260.00 cm/s    Mitral Valve Max Velocity 119.00 cm/s    MV Peak Gradient 6.00 mmHg    Mitral Valve E Wave Deceleration Time 180.00 ms    MV A Vitor 39.00 cm/s    MV E Vitor 129.00 cm/s    MV Mean Gradient 1.00 mmHg    Mitral Valve Annulus Velocity Time Integral 21.70 cm    MV E/A 3.31     Pulmonic Regurgitant End Max Velocity 70.90 cm/s    Pulmonic Valve Systolic Peak Instantaneous Gradient 2.00 mmHg    RVIDd 3.03 cm    Tricuspid Valve Max Velocity 290.00 cm/s    Triscuspid Valve Regurgitation Peak Gradient 34.00 mmHg    Tapse 1.40 (A) 1.50 - 2.00 cm    Right Atrial Area 4C 22.44 cm2    LA Area 4C 20.83 cm2    BP EF 23.1 55.0 - 100.0 %    LV Mass .8 88.0 - 224.0 g    LV Mass AL Index 111.8 49.0 - 115.0 g/m2    PRANEETH/BSA Pk Vitor 1.1 cm2/m2   CBC W/O DIFF    Collection Time: 06/05/21  3:02 PM   Result Value Ref Range    WBC 7.4 4.1 - 11.1 K/uL    RBC 4.02 (L) 4.10 - 5.70 M/uL    HGB 11.0 (L) 12.1 - 17.0 g/dL    HCT 37.4 36.6 - 50.3 %    MCV 93.0 80.0 - 99.0 FL    MCH 27.4 26.0 - 34.0 PG    MCHC 29.4 (L) 30.0 - 36.5 g/dL    RDW 14.8 (H) 11.5 - 14.5 %    PLATELET 860 995 - 993 K/uL    MPV 10.7 8.9 - 12.9 FL    NRBC 0.0 0.0  WBC    ABSOLUTE NRBC 0.00 0.00 - 7.01 K/uL   METABOLIC PANEL, COMPREHENSIVE    Collection Time: 06/05/21  3:02 PM   Result Value Ref Range    Sodium 142 136 - 145 mmol/L    Potassium 4.4 3.5 - 5.1 mmol/L    Chloride 108 97 - 108 mmol/L    CO2 24 21 - 32 mmol/L    Anion gap 10 5 - 15 mmol/L    Glucose 108 (H) 65 - 100 mg/dL    BUN 29 (H) 6 - 20 mg/dL    Creatinine 1.42 (H) 0.70 - 1.30 mg/dL    BUN/Creatinine ratio 20 12 - 20      GFR est AA >60 >60 ml/min/1.73m2 GFR est non-AA 50 (L) >60 ml/min/1.73m2    Calcium 8.9 8.5 - 10.1 mg/dL    Bilirubin, total 0.4 0.2 - 1.0 mg/dL    AST (SGOT) 32 15 - 37 U/L    ALT (SGPT) 43 12 - 78 U/L    Alk. phosphatase 74 45 - 117 U/L    Protein, total 7.8 6.4 - 8.2 g/dL    Albumin 3.0 (L) 3.5 - 5.0 g/dL    Globulin 4.8 (H) 2.0 - 4.0 g/dL    A-G Ratio 0.6 (L) 1.1 - 2.2     GLUCOSE, POC    Collection Time: 06/05/21  3:56 PM   Result Value Ref Range    Glucose (POC) 100 65 - 117 mg/dL    Performed by Prema Queen    GLUCOSE, POC    Collection Time: 06/05/21  8:47 PM   Result Value Ref Range    Glucose (POC) 110 65 - 117 mg/dL    Performed by Kelsey Cooper    GLUCOSE, POC    Collection Time: 06/06/21  9:37 AM   Result Value Ref Range    Glucose (POC) 153 (H) 65 - 117 mg/dL    Performed by Nidhi Salinas      [unfilled]      Review of Systems    Constitutional: Negative for chills and fever. HENT: Negative. Eyes: Negative. Respiratory: SOB with exertion   Cardiovascular: Negative. Gastrointestinal: Negative for abdominal pain and nausea. Skin: Negative. Neurological: Negative. Physical Exam:      Constitutional: pt is oriented to person, place, and time. HENT:   Head: Normocephalic and atraumatic. Eyes: Pupils are equal, round, and reactive to light. EOM are normal.   Cardiovascular: Normal rate, regular rhythm and normal heart sounds. Pulmonary/Chest: Breath sounds normal. No wheezes. No rales. Exhibits no tenderness. Abdominal: Soft. Bowel sounds are normal. There is no abdominal tenderness. There is no rebound and no guarding. Musculoskeletal: Normal range of motion. Neurological: pt is alert and oriented to person, place, and time. Extremities: Venous ulcers R and L lateral lower legs with surrounding discoloration of lower extremities, currently covered with wound dressings    XR CHEST PORT   Final Result   Stable mild-to-moderate enlargement of cardiomediastinal silhouette.    Mediastinum underpenetrated with suboptimal visualization of right atrial and   right ventricular pacing leads. There is obscuration of a portion of the left   hemithorax by the generator device. No pneumothorax. No evidence of pulmonary   edema, air space pneumonia, or pleural effusion.            Recent Results (from the past 24 hour(s))   GLUCOSE, POC    Collection Time: 06/05/21 11:21 AM   Result Value Ref Range    Glucose (POC) 106 65 - 117 mg/dL    Performed by [facility]    ECHO ADULT COMPLETE    Collection Time: 06/05/21 12:43 PM   Result Value Ref Range    Pulmonic Regurgitant End Max Velocity 93.50 cm/s    AoV PG 3.00 mmHg    Aortic Valve Area by Continuity of Peak Velocity 2.91 cm2    Ao Root D 4.30 cm    IVSd 0.94 0.60 - 1.00 cm    LVIDd 6.95 (A) 4.20 - 5.90 cm    LVIDs 6.18 cm    LVOT d 2.50 cm    Pulmonic Regurgitant End Max Velocity 55.40 cm/s    LVOT Peak Gradient 1.00 mmHg    LVPWd 1.02 (A) 0.60 - 1.00 cm    LV E' Septal Velocity 3.22 cm/s    LV ED Vol A2C 336.00 cm3    LV ES Vol A2C 236.00 cm3    E/E' septal 40.06     Left Atrium Major Axis 4.20 cm    Left Atrium Major Axis 4.20 cm    Pulmonic Regurgitant End Max Velocity 260.00 cm/s    Mitral Valve Max Velocity 119.00 cm/s    MV Peak Gradient 6.00 mmHg    Mitral Valve E Wave Deceleration Time 180.00 ms    MV A Vitor 39.00 cm/s    MV E Vitor 129.00 cm/s    MV Mean Gradient 1.00 mmHg    Mitral Valve Annulus Velocity Time Integral 21.70 cm    MV E/A 3.31     Pulmonic Regurgitant End Max Velocity 70.90 cm/s    Pulmonic Valve Systolic Peak Instantaneous Gradient 2.00 mmHg    RVIDd 3.03 cm    Tricuspid Valve Max Velocity 290.00 cm/s    Triscuspid Valve Regurgitation Peak Gradient 34.00 mmHg    Tapse 1.40 (A) 1.50 - 2.00 cm    Right Atrial Area 4C 22.44 cm2    LA Area 4C 20.83 cm2    BP EF 23.1 55.0 - 100.0 %    LV Mass .8 88.0 - 224.0 g    LV Mass AL Index 111.8 49.0 - 115.0 g/m2    PRANEETH/BSA Pk Vitor 1.1 cm2/m2   CBC W/O DIFF    Collection Time: 06/05/21  3:02 PM Result Value Ref Range    WBC 7.4 4.1 - 11.1 K/uL    RBC 4.02 (L) 4.10 - 5.70 M/uL    HGB 11.0 (L) 12.1 - 17.0 g/dL    HCT 37.4 36.6 - 50.3 %    MCV 93.0 80.0 - 99.0 FL    MCH 27.4 26.0 - 34.0 PG    MCHC 29.4 (L) 30.0 - 36.5 g/dL    RDW 14.8 (H) 11.5 - 14.5 %    PLATELET 536 656 - 582 K/uL    MPV 10.7 8.9 - 12.9 FL    NRBC 0.0 0.0  WBC    ABSOLUTE NRBC 0.00 0.00 - 4.09 K/uL   METABOLIC PANEL, COMPREHENSIVE    Collection Time: 06/05/21  3:02 PM   Result Value Ref Range    Sodium 142 136 - 145 mmol/L    Potassium 4.4 3.5 - 5.1 mmol/L    Chloride 108 97 - 108 mmol/L    CO2 24 21 - 32 mmol/L    Anion gap 10 5 - 15 mmol/L    Glucose 108 (H) 65 - 100 mg/dL    BUN 29 (H) 6 - 20 mg/dL    Creatinine 1.42 (H) 0.70 - 1.30 mg/dL    BUN/Creatinine ratio 20 12 - 20      GFR est AA >60 >60 ml/min/1.73m2    GFR est non-AA 50 (L) >60 ml/min/1.73m2    Calcium 8.9 8.5 - 10.1 mg/dL    Bilirubin, total 0.4 0.2 - 1.0 mg/dL    AST (SGOT) 32 15 - 37 U/L    ALT (SGPT) 43 12 - 78 U/L    Alk.  phosphatase 74 45 - 117 U/L    Protein, total 7.8 6.4 - 8.2 g/dL    Albumin 3.0 (L) 3.5 - 5.0 g/dL    Globulin 4.8 (H) 2.0 - 4.0 g/dL    A-G Ratio 0.6 (L) 1.1 - 2.2     GLUCOSE, POC    Collection Time: 06/05/21  3:56 PM   Result Value Ref Range    Glucose (POC) 100 65 - 117 mg/dL    Performed by Paul Singh    GLUCOSE, POC    Collection Time: 06/05/21  8:47 PM   Result Value Ref Range    Glucose (POC) 110 65 - 117 mg/dL    Performed by Abdoulaye Lopez    GLUCOSE, POC    Collection Time: 06/06/21  9:37 AM   Result Value Ref Range    Glucose (POC) 153 (H) 65 - 117 mg/dL    Performed by Erica Eaton        Results     Procedure Component Value Units Date/Time    CULTURE, BLOOD #1 [[id number]] Collected: 06/02/21 1231    Order Status: Completed Specimen: Blood Updated: 06/06/21 0719     Special Requests: No Special Requests        Culture result: No growth 3 days       CULTURE, BLOOD #2 [[id number]] Collected: 06/02/21 1231    Order Status: Completed Specimen: Blood Updated: 06/06/21 0719     Special Requests: No Special Requests        Culture result: No growth 3 days       CULTURE, BLOOD, PAIRED [[id number]] Collected: 06/01/21 2015    Order Status: Completed Specimen: Blood Updated: 06/06/21 0719     Special Requests: No Special Requests        Culture result: No growth 4 days              Labs:     Recent Labs     06/05/21  1502 06/04/21 0612   WBC 7.4 7.1   HGB 11.0* 11.3*   HCT 37.4 37.4    287     Recent Labs     06/05/21  1502 06/04/21 0612    140   K 4.4 3.7    106   CO2 24 29   BUN 29* 28*   CREA 1.42* 1.35*   * 110*   CA 8.9 8.9     Recent Labs     06/05/21  1502 06/04/21 0612   ALT 43 38   AP 74 65   TBILI 0.4 0.4   TP 7.8 7.6   ALB 3.0* 2.8*   GLOB 4.8* 4.8*     No results for input(s): INR, PTP, APTT, INREXT, INREXT in the last 72 hours. No results for input(s): FE, TIBC, PSAT, FERR in the last 72 hours. No results found for: FOL, RBCF   No results for input(s): PH, PCO2, PO2 in the last 72 hours. No results for input(s): CPK, CKNDX, TROIQ in the last 72 hours.     No lab exists for component: CPKMB  No results found for: CHOL, CHOLX, CHLST, CHOLV, HDL, HDLP, LDL, LDLC, DLDLP, TGLX, TRIGL, TRIGP, CHHD, CHHDX  Lab Results   Component Value Date/Time    Glucose (POC) 153 (H) 06/06/2021 09:37 AM    Glucose (POC) 110 06/05/2021 08:47 PM    Glucose (POC) 100 06/05/2021 03:56 PM    Glucose (POC) 106 06/05/2021 11:21 AM    Glucose (POC) 97 06/05/2021 07:38 AM     No results found for: COLOR, APPRN, SPGRU, REFSG, K[place], PROTU, GLUCU, KETU, BILU, UROU, DELMY, LEUKU, GLUKE, EPSU, BACTU, WBCU, RBCU, CASTS, UCRY      Assessment:        Acute cellulitis of both legs  Venous stasis ulcer  Chronic hypoxemic respiratory failure  Acute on chronic kidney disease  History of CHF diastolic dysfunction  COPD  Type 2 diabetes  Hypertension  Gout  Arthritis   Sleep Apnea   Morbid obesity  Superficial Thrombosis of left greater saphenous vein Ejection fraction about 40 to 45%         Plan:         Check echo. Continue IV diuresis. Stop HCTZ and switch to Bumex IV 1 mg twice a day  Continue aspirin  Monitor kidney function  Stop colchicine due to abnormal kidney function. Daily weights and ins and outs   Replete potassium-3.7. Per ID:   Continue vancomycin and zosyn pending repeat wound cultures. Restart home dose of Gabapentin. Leg elevation to help with venous return. Routine labs in morning. Continue Trilogy. Continue nystatin for yeast infection in groin area. In by the vascular surgeon's recommendations are      PT OT consult    Patient was advised strongly that he needs to have both legs elevated in order for these 2 wounds to heal.  Once the swelling is a relatively under control and the secondary edema and cellulitis resolved we were planning on Profore dressing both lower extremity. I have instructed nursing staff to keep both legs elevated 3 pillows at least if is tolerable.     Possible discharge in the morning            Current Facility-Administered Medications:     vitamin E acetate capsule 800 Units, 800 Units, Oral, BID, Moni Patel MD, 800 Units at 06/06/21 0950    [START ON 6/7/2021] metFORMIN (GLUCOPHAGE) tablet 1,000 mg, 1,000 mg, Oral, BID WITH MEALS, Isaías Patel MD    ammonium lactate (AMLACTIN) 12 % cream, , Topical, PRN, Moni Patel MD, Given at 06/06/21 0951    insulin glargine (LANTUS) injection 32 Units, 32 Units, SubCUTAneous, QHS, Darren Maurer MD, 32 Units at 06/04/21 2121    insulin glargine (LANTUS) injection 80 Units, 80 Units, SubCUTAneous, DAILY, Darren Maurer MD, 80 Units at 06/06/21 0950    nystatin (MYCOSTATIN) 100,000 unit/gram powder, , Topical, BID, Moni Patel MD, Given at 06/06/21 0951    Saccharomyces boulardii (FLORASTOR) capsule 250 mg, 250 mg, Oral, BID, Moni Patel MD, 250 mg at 06/06/21 1015    colchicine tablet 0.6 mg, 0.6 mg, Oral, DAILY, Samson, Yuli Lambert MD, 0.6 mg at 06/06/21 1015    piperacillin-tazobactam (ZOSYN) 3.375 g in 0.9% sodium chloride (MBP/ADV) 100 mL MBP, 3.375 g, IntraVENous, Q8H, Moni Patel MD, Last Rate: 25 mL/hr at 06/05/21 2158, 3.375 g at 06/05/21 2158    glucose chewable tablet 16 g, 4 Tablet, Oral, PRN, Willow Patel MD    dextrose (D50W) injection syrg 12.5-25 g, 25-50 mL, IntraVENous, PRN, Willow Patel MD    glucagon (GLUCAGEN) injection 1 mg, 1 mg, IntraMUSCular, PRN, Willow Patel MD    albuterol (PROVENTIL HFA, VENTOLIN HFA, PROAIR HFA) inhaler 2 Puff, 2 Puff, Inhalation, Q6H PRN, Moni Patel MD    ascorbic acid (vitamin C) (VITAMIN C) tablet 1,000 mg, 1,000 mg, Oral, BID, Moni Patel MD, 1,000 mg at 06/06/21 8746    aspirin chewable tablet 81 mg, 81 mg, Oral, DAILY, Moni Patel MD, 81 mg at 06/06/21 9990    ferrous sulfate tablet 325 mg, 1 Tablet, Oral, ACB, Moni Patel MD, 325 mg at 06/06/21 0950    fluticasone propionate (FLONASE) 50 mcg/actuation nasal spray 2 Spray, 2 Spray, Both Nostrils, DAILY, Moni Patel MD, 2 [place] at 06/06/21 2814    gabapentin (NEURONTIN) capsule 300 mg, 300 mg, Oral, QID, oMni Patel MD, 300 mg at 06/06/21 0950    multivitamin (ONE A DAY) tablet 1 Tablet, 1 Tablet, Oral, DAILY, Moni Patel MD, 1 Tablet at 06/06/21 0950    tamsulosin (FLOMAX) capsule 0.4 mg, 0.4 mg, Oral, DAILY, Moni Patel MD, 0.4 mg at 06/06/21 0950    tiotropium bromide (SPIRIVA RESPIMAT) 2.5 mcg /actuation, 5 mcg, Inhalation, DAILY, Moni Patel MD, 2 Puff at 06/06/21 5535    insulin lispro (HUMALOG) injection, , SubCUTAneous, AC&HS, Moni Patel MD, 3 Units at 06/06/21 0950    acetaminophen (TYLENOL) tablet 650 mg, 650 mg, Oral, Q6H PRN **OR** acetaminophen (TYLENOL) suppository 650 mg, 650 mg, Rectal, Q6H PRN, Moni Patel MD    polyethylene glycol (MIRALAX) packet 17 g, 17 g, Oral, DAILY PRN, MD Carlos Bustos promethazine (PHENERGAN) tablet 12.5 mg, 12.5 mg, Oral, Q6H PRN **OR** ondansetron (ZOFRAN) injection 4 mg, 4 mg, IntraVENous, Q6H PRN, Moni Patel MD    oxyCODONE IR (ROXICODONE) tablet 10 mg, 10 mg, Oral, Q6H PRN, Moni Patel MD, 10 mg at 06/06/21 1013    bumetanide (BUMEX) injection 1 mg, 1 mg, IntraVENous, BID, Kari Spann MD, 1 mg at 06/05/21 9145

## 2021-06-06 NOTE — PROGRESS NOTES
PULMONARY NOTE  [facility]    Name: Hortensia Monroy MRN: [id number]   : 1955 Hospital: [address] [place] [address]   Date: 2021  Admission date: 2021 Hospital Day: 6       HPI:     Hospital Problems  Date Reviewed: 2021        Codes Class Noted POA    Cellulitis and abscess of right leg ICD-10-CM: L03.115, L02.415  ICD-9-CM: 682.6  2021 Unknown        Cellulitis ICD-10-CM: L03.90  ICD-9-CM: 682.9  2021 Unknown                   [x] High complexity decision making was performed  [x] See my orders for details      Subjective/Initial History:     I was asked by Juan Carlos Clemons MD to see Hortensia Monroy  a 76 y.o.  male in consultation     Excerpts from admission 2021 or consult notes as follows:   58-year-old morbidly obese gentleman came in because of leg pain and swelling he has history of venous stasis leg edema diabetes mellitus, significant past medical history of COPD chronic hypoxic hypercapnic respiratory failure on trilogy noninvasive ventilator at home he was seen by me about a month ago he was doing fairly well he was compliant with his trilogy ventilator came in because of swelling of the lower extremities his BNP was elevated.        Allergies   Allergen Reactions    Elavil Unknown (comments)    Sulfa (Sulfonamide Antibiotics) Nausea and Vomiting        MAR reviewed and pertinent medications noted or modified as needed     Current Facility-Administered Medications   Medication    vitamin E acetate capsule 800 Units    [START ON 2021] metFORMIN (GLUCOPHAGE) tablet 1,000 mg    ammonium lactate (AMLACTIN) 12 % cream    insulin glargine (LANTUS) injection 32 Units    insulin glargine (LANTUS) injection 80 Units    nystatin (MYCOSTATIN) 100,000 unit/gram powder    Saccharomyces boulardii (FLORASTOR) capsule 250 mg    colchicine tablet 0.6 mg    piperacillin-tazobactam (ZOSYN) 3.375 g in 0.9% sodium chloride (MBP/ADV) 100 mL MBP    glucose chewable tablet 16 g    dextrose (D50W) injection syrg 12.5-25 g    glucagon (GLUCAGEN) injection 1 mg    albuterol (PROVENTIL HFA, VENTOLIN HFA, PROAIR HFA) inhaler 2 Puff    ascorbic acid (vitamin C) (VITAMIN C) tablet 1,000 mg    aspirin chewable tablet 81 mg    ferrous sulfate tablet 325 mg    fluticasone propionate (FLONASE) 50 mcg/actuation nasal spray 2 Spray    gabapentin (NEURONTIN) capsule 300 mg    multivitamin (ONE A DAY) tablet 1 Tablet    tamsulosin (FLOMAX) capsule 0.4 mg    tiotropium bromide (SPIRIVA RESPIMAT) 2.5 mcg /actuation    insulin lispro (HUMALOG) injection    acetaminophen (TYLENOL) tablet 650 mg    Or    acetaminophen (TYLENOL) suppository 650 mg    polyethylene glycol (MIRALAX) packet 17 g    promethazine (PHENERGAN) tablet 12.5 mg    Or    ondansetron (ZOFRAN) injection 4 mg    oxyCODONE IR (ROXICODONE) tablet 10 mg    bumetanide (BUMEX) injection 1 mg      Patient PCP: Babak Barton MD  PMH:  has a past medical history of Arthritis, Chronic obstructive pulmonary disease ([facility] [address] Diabetes ([facility] [address] Gout, Hypertension, Ill-defined condition, and Sleep apnea. PSH:   has a past surgical history that includes hx orthopaedic and hx vein ablation adhesive. FHX: family history includes Diabetes in his brother, mother, and sister; Heart Disease in his father and mother; Hypertension in his father and mother; Kidney Disease in his mother. SHX:  reports that he has never smoked. He has never used smokeless tobacco. He reports current alcohol use. He reports that he does not use drugs. ROS:    Review of Systems   Constitutional: Positive for malaise/fatigue. HENT: Negative. Eyes: Negative. Respiratory: Positive for shortness of breath. Cardiovascular: Positive for orthopnea and claudication. Gastrointestinal: Negative. Genitourinary: Negative. Musculoskeletal:        Bilateral leg edema   Skin: Negative. Neurological: Negative. Psychiatric/Behavioral: Negative. Objective:     Vital Signs: Telemetry:    normal sinus rhythm Intake/Output:   Visit Vitals  /70 (BP 1 Location: Right arm, BP Patient Position: At rest)   Pulse 91   Temp 97.9 °F (36.6 °C)   Resp 20   Ht 5' 11\" (1.803 m)   Wt (!) 172.6 kg (380 lb 6.5 oz)   SpO2 99%   BMI 53.06 kg/m²       Temp (24hrs), Av °F (36.7 °C), Min:97.9 °F (36.6 °C), Max:98.1 °F (36.7 °C)        O2 Device: Nasal cannula O2 Flow Rate (L/min): 3 l/min       Wt Readings from Last 4 Encounters:   21 (!) 172.6 kg (380 lb 6.5 oz)   21 136.1 kg (300 lb)   20 (!) 166.5 kg (367 lb)          Intake/Output Summary (Last 24 hours) at 2021 1120  Last data filed at 2021 0257  Gross per 24 hour   Intake --   Output 1175 ml   Net -1175 ml       Last shift:      No intake/output data recorded. Last 3 shifts:  1901 -  0700  In: -   Out: 6271 [Urine:3215]       Physical Exam:     Physical Exam  Constitutional:       Appearance: He is obese. HENT:      Head: Normocephalic and atraumatic. Nose: Nose normal.      Mouth/Throat:      Mouth: Mucous membranes are dry. Eyes:      Pupils: Pupils are equal, round, and reactive to light. Cardiovascular:      Rate and Rhythm: Normal rate and regular rhythm. Pulses: Normal pulses. Heart sounds: Normal heart sounds. Pulmonary:      Effort: Respiratory distress present. Breath sounds: Rales present. Abdominal:      General: Abdomen is flat. Bowel sounds are normal.      Palpations: Abdomen is soft. Musculoskeletal:         General: Swelling present. Cervical back: Normal range of motion and neck supple. Right lower leg: Edema present. Left lower leg: Edema present. Comments: Erythema tenderness on deep palpation  bilateral lower extremities   Neurological:      Mental Status: He is alert.           Labs:    Recent Labs     21  1502 21  0612   WBC 7.4 7.1   HGB 11.0* 11.3*  287     Recent Labs     06/05/21  1502 06/04/21  0612    140   K 4.4 3.7    106   CO2 24 29   * 110*   BUN 29* 28*   CREA 1.42* 1.35*   CA 8.9 8.9   ALB 3.0* 2.8*   ALT 43 38     No results for input(s): PH, PCO2, PO2, HCO3, FIO2 in the last 72 hours. No results for input(s): CPK, CKNDX, TROIQ in the last 72 hours. No lab exists for component: CPKMB  No results found for: BNPP, BNP   Lab Results   Component Value Date/Time    Culture result: No growth 3 days 06/02/2021 12:31 PM    Culture result: No growth 3 days 06/02/2021 12:31 PM    Culture result: No growth 4 days 06/01/2021 08:15 PM   No results found for: TSH, TSHEXT, TSHEXT    Imaging:    CXR Results  (Last 48 hours)    None        Results from Hospital Encounter encounter on 06/01/21    XR CHEST PORT    Narrative  Portable chest, 1949 hours. Comparison with 6/22/2020. Impression  Stable mild-to-moderate enlargement of cardiomediastinal silhouette. Mediastinum underpenetrated with suboptimal visualization of right atrial and  right ventricular pacing leads. There is obscuration of a portion of the left  hemithorax by the generator device. No pneumothorax. No evidence of pulmonary  edema, air space pneumonia, or pleural effusion. No results found for this or any previous visit. IMPRESSION:   1. Acute on chronic hypoxic hypercapnic respiratory failure  2. Chronic Obstructive Pulmonary Disease   3. Venous stasis ulcer with leg wound chronic leg edema possible cellulitis  4. CHF with diastolic dysfunction  5. Obstructive sleep apnea  6. Obesity gout hypertension type 2 diabetes  7. Acute kidney injury      RECOMMENDATIONS/PLAN:     1. Trilogy for non invasive ventilatory life support to prevent worsening respiratory acidosis  2. Patient is not actively wheezing no need for any systemic steroids  3. Continue with Bumex  4. Patient is on Zosyn  5.  Vascular surgeon on case for peripheral vascular edema and cellulitis Skeet Lefort, MD

## 2021-06-06 NOTE — PROGRESS NOTES
Progress Note    Patient: Jessi Wong MRN: [id number]  SSN: [id number]    YOB: 1955  Age: 76 y.o. Sex: male      Admit Date: 6/1/2021    LOS: 4 days     Subjective:   No acute events overnight    Objective:     Vitals:    06/05/21 2049 06/06/21 0114 06/06/21 0821 06/06/21 0938   BP: 111/65   121/70   Pulse: 80   91   Resp: 16   20   Temp: 98.1 °F (36.7 °C)   97.9 °F (36.6 °C)   SpO2: 99%  99% 99%   Weight:  (!) 172.6 kg (380 lb 6.5 oz)     Height:            Intake and Output:  Current Shift: No intake/output data recorded. Last three shifts: 06/04 1901 - 06/06 0700  In: -   Out: 3144 [Urine:3215]    Physical Exam:   General:  Alert, cooperative, no distress, appears stated age. Eyes:  Conjunctivae/corneas clear. PERRL, EOMs intact. Fundi benign   Ears:  Normal TMs and external ear canals both ears. Nose: Nares normal. Septum midline. Mucosa normal. No drainage or sinus tenderness. Mouth/Throat: Lips, mucosa, and tongue normal. Teeth and gums normal.   Neck: Supple, symmetrical, trachea midline, no adenopathy, thyroid: no enlargment/tenderness/nodules, no carotid bruit and no JVD. Back:   Symmetric, no curvature. ROM normal. No CVA tenderness. Lungs:   Clear to auscultation bilaterally. Heart:  Regular rate and rhythm, S1, S2 normal, no murmur, click, rub or gallop. Abdomen:   Soft, non-tender. Bowel sounds normal. No masses,  No organomegaly. Extremities: Extremities normal, atraumatic, no cyanosis or edema. Pulses: 2+ and symmetric all extremities. Skin: Skin color, texture, turgor normal. No rashes or lesions   Lymph nodes: Cervical, supraclavicular, and axillary nodes normal.   Neurologic: CNII-XII intact. Normal strength, sensation and reflexes throughout. Lab/Data Review: All lab results for the last 24 hours reviewed.          Assessment:     Active Problems:    Cellulitis (6/1/2021)      Cellulitis and abscess of right leg (6/2/2021)     Estuardo Carrillo is a 58-year-old white male with:  1. Heart failure with decompensation. 2.  Morbid obesity. 3.  Hypertension with hypertensive heart disease. 4.  Mixed hyperlipidemia  5. Diabetes mellitus with neuropathy. 6.  Venous insufficiency, status post ablation( Dr. Suzette Ríos)  7. Obstructive sleep apnea, on CPAP machine. 8.  COPD  9. Gout. 10. SSS s/p DCPM 9/2019  11. Off BB due to easy fatigability. Plan:       Continue IV diuresis. Creatinine of 1.34. Recheck labs in a.m. We will continue close monitor ins and outs. Patient will need deep venous assessment at some point.   Signed By: Yana Espinosa MD     June 6, 2021

## 2021-06-06 NOTE — PROGRESS NOTES
PROGRESS NOTE      Chief Complaints: No new particular complaints. Patient has not been compliant with leg elevation both legs. HPI and  Objective:    Patient denies any particular pain. Denies any fever chills. Denies chest pain shortness breath or abdominal pain or nausea. We are currently applying bilateral venous stasis wounds with Xeroform gauze with a Kerlix roll. Review of Systems:  Rest of review of systems negative. EXAM:  Visit Vitals  /70 (BP 1 Location: Right arm, BP Patient Position: At rest)   Pulse 91   Temp 97.9 °F (36.6 °C)   Resp 20   Ht 5' 11\" (1.803 m)   Wt (!) 380 lb 6.5 oz (172.6 kg)   SpO2 99%   BMI 53.06 kg/m²     Patient is awake and alert  He is atraumatic normocephalic  Cardiovascular regular rate  Pulmonary no audible wheeze  Abdomen soft  Neurologically intact. Skin is warm and moist.  Both leg has still significant edema and swelling. Dressings intact. I personally did not remove the dressing today. Recent Results (from the past 24 hour(s))   GLUCOSE, POC    Collection Time: 06/05/21  8:47 PM   Result Value Ref Range    Glucose (POC) 110 65 - 117 mg/dL    Performed by Joan Wolf    GLUCOSE, POC    Collection Time: 06/06/21  9:37 AM   Result Value Ref Range    Glucose (POC) 153 (H) 65 - 117 mg/dL    Performed by Saba Bazan    CBC WITH AUTOMATED DIFF    Collection Time: 06/06/21 11:11 AM   Result Value Ref Range    WBC 6.7 4.1 - 11.1 K/uL    RBC 4.07 (L) 4.10 - 5.70 M/uL    HGB 11.4 (L) 12.1 - 17.0 g/dL    HCT 38.4 36.6 - 50.3 %    MCV 94.3 80.0 - 99.0 FL    MCH 28.0 26.0 - 34.0 PG    MCHC 29.7 (L) 30.0 - 36.5 g/dL    RDW 15.0 (H) 11.5 - 14.5 %    PLATELET 144 926 - 460 K/uL    MPV 10.9 8.9 - 12.9 FL    NRBC 0.0 0.0  WBC    ABSOLUTE NRBC 0.00 0.00 - 0.01 K/uL    NEUTROPHILS 69 32 - 75 %    LYMPHOCYTES 19 12 - 49 %    MONOCYTES 7 5 - 13 %    EOSINOPHILS 5 0 - 7 %    BASOPHILS 0 0 - 1 %    IMMATURE GRANULOCYTES 0 0 - 0.5 %    ABS.  NEUTROPHILS 4.5 1.8 - 8.0 K/UL ABS. LYMPHOCYTES 1.3 0.8 - 3.5 K/UL    ABS. MONOCYTES 0.5 0.0 - 1.0 K/UL    ABS. EOSINOPHILS 0.4 0.0 - 0.4 K/UL    ABS. BASOPHILS 0.0 0.0 - 0.1 K/UL    ABS. IMM. GRANS. 0.0 0.00 - 0.04 K/UL    DF AUTOMATED     METABOLIC PANEL, COMPREHENSIVE    Collection Time: 06/06/21 11:11 AM   Result Value Ref Range    Sodium 139 136 - 145 mmol/L    Potassium 4.1 3.5 - 5.1 mmol/L    Chloride 106 97 - 108 mmol/L    CO2 28 21 - 32 mmol/L    Anion gap 5 5 - 15 mmol/L    Glucose 157 (H) 65 - 100 mg/dL    BUN 26 (H) 6 - 20 mg/dL    Creatinine 1.34 (H) 0.70 - 1.30 mg/dL    BUN/Creatinine ratio 19 12 - 20      GFR est AA >60 >60 ml/min/1.73m2    GFR est non-AA 53 (L) >60 ml/min/1.73m2    Calcium 9.1 8.5 - 10.1 mg/dL    Bilirubin, total 0.4 0.2 - 1.0 mg/dL    AST (SGOT) 28 15 - 37 U/L    ALT (SGPT) 42 12 - 78 U/L    Alk. phosphatase 73 45 - 117 U/L    Protein, total 8.0 6.4 - 8.2 g/dL    Albumin 3.0 (L) 3.5 - 5.0 g/dL    Globulin 5.0 (H) 2.0 - 4.0 g/dL    A-G Ratio 0.6 (L) 1.1 - 2.2     GLUCOSE, POC    Collection Time: 06/06/21  1:48 PM   Result Value Ref Range    Glucose (POC) 152 (H) 65 - 117 mg/dL    Performed by Marlene Kurtz, POC    Collection Time: 06/06/21  4:08 PM   Result Value Ref Range    Glucose (POC) 101 65 - 117 mg/dL    Performed by Hector Cunningham(PCT)        ASSESSMENT:   Patient is 76 y.o. with diagnosis of : Active Problems:    Cellulitis (6/1/2021)      Cellulitis and abscess of right leg (6/2/2021)        PLAN:                 I have instructed patient and I instructed nursing staff initiate strict bedrest with keep both legs elevated at least 3 pillows both side. Otherwise I will be having challenging problem with a resolving cellulitis. Anyways at some point once cellulitis is resolved we will planning on Profore dressing application both calf area. We will look at the both legs possibly Monday or Tuesday with the wound care team if both legs are ready for Profore dressing application.

## 2021-06-06 NOTE — PROGRESS NOTES
OCCUPATIONAL THERAPY EVALUATION/DISCHARGE  Patient: Geni Porter (10 y.o. male)  Date: 6/6/2021  Primary Diagnosis: Cellulitis [L03.90]  Cellulitis and abscess of right leg [L03.115, L02.415]        Precautions:        ASSESSMENT  Pt is 77 y/o male came to [facility] with worsening leg cellulitis and adm 6/1/2021 for acute cellulitis nehemiah LE's, venous stasis ulcer, chornic hypoxemic respiratory failure, acute on chornic kidney disease, superficial thrombosis of left greater saphenous vein. Pt has hx of COPD, DM, GOUT, HTN, arthritis, sleep apnea (use of trilogy at home), vein ablation. Pt received seated EOB A&Ox4 and agreeable for OT eval/tx. Per pt report, pt lives with spouse in 1 story home with ramp to enter and is MI for self care (bathing at sink, use of reacher, sock aid and shoe horn and raised toilet seat) and MI for functional transfers/mobility with use of quad cane as needed (also has RW, WC and shower chair). Pt currently presents close to/at baseline for self care (MI toileting/cloth mgmt during stance, MI grooming standing simulated, MI LB dressing simulated with pt verbalizizing use of sock aid and shoe horn when needed) and functional transfers/mobility (MI sit<->stand, scooting EOb and toilet transfer simulated). Pt stating at times difficulty with bowel hygiene and pt provided information on AE for toileting task with pt vebralizing understanding. Pt with no acute OT needs at this time thus will D/C after OT eval/tx. Recommend discharge to home with [address] [place] [address] [place] when medically appropraite.        Current Level of Function (ADLs/self-care): MI for self care tasks    Other factors to consider for discharge: time since onset, severity of deficits, PLOF     PLAN :  Recommend with staff: allow toilet transfers    Recommendation for discharge: (in order for the patient to meet his/her long term goals)  HHOT    This discharge recommendation:  Has been made in collaboration with the attending provider and/or case management    IF patient discharges home will need the following DME: toilet ashely       SUBJECTIVE:   Patient stated I have almost the entire [employer] medical store in my home.     OBJECTIVE DATA SUMMARY:   HISTORY:   Past Medical History:   Diagnosis Date    Arthritis     [facility] ([facility] [address] [facility]     Diabetes ([facility] [address] [facility]     Gout     Hypertension     Ill-defined condition     Sleep apnea      Past Surgical History:   Procedure Laterality Date    HX ORTHOPAEDIC      HX VEIN ABLATION ADHESIVE         Prior Level of Function/Environment/Context: MI for self care and functional transfers/mobility  Expanded or extensive additional review of patient history:   Home Situation  Home Environment: Private residence  Wheelchair Ramp: Yes  One/Two Story Residence: One story  Living Alone: No  Support Systems: Spouse/Significant Other/Partner  Patient Expects to be Discharged to[de-identified] House  Current DME Used/Available at Home: Kayla Dailey, [address] bed, Walker, Wheelchair    EXAMINATION OF PERFORMANCE DEFICITS:  Cognitive/Behavioral Status:  Neurologic State: Alert  Orientation Level: Oriented X4     Hearing:   Auditory  Auditory Impairment: None    Range of Motion:  AROM: Generally decreased, functional     Strength:  Strength: Generally decreased, functional (grossly observed to be 3+/5)     Balance:  Sitting: Intact  Standing: Intact    Functional Mobility and Transfers for ADLs:  Bed Mobility:  Scooting: Modified independent    Transfers:  Sit to Stand: Modified independent  Stand to Sit: Modified independent  Bed to Chair: Modified independent  Bathroom Mobility: Modified independent  Toilet Transfer : Modified independent  Assistive Device : Cane, quad    ADL Assessment:     Oral Facial Hygiene/Grooming: Modified Independent (standing simulated)    Lower Body Dressing: Modified independent (simulated)    Toileting: Modified independent (simulated)     ADL Intervention and task modifications:  Feeding  Feeding Assistance: Independent    Grooming  Grooming Assistance: Modified independent  Position Performed: Standing    Lower Body Dressing Assistance  Socks: Modified independent    Toileting  Toileting Assistance: Modified independent (simulated)  Bladder Hygiene: Modified independent  Clothing Management: Modified independent      [facility] AM-PACTM \"6 Clicks\"                                                       Daily Activity Inpatient Short Form  How much help from another person does the patient currently need. .. Total; A Lot A Little None   1. Putting on and taking off regular lower body clothing? []  1 []  2 []  3 [x]  4   2. Bathing (including washing, rinsing, drying)? []  1 []  2 []  3 [x]  4   3. Toileting, which includes using toilet, bedpan or urinal? [] 1 []  2 []  3 [x]  4   4. Putting on and taking off regular upper body clothing? []  1 []  2 []  3 [x]  4   5. Taking care of personal grooming such as brushing teeth? []  1 []  2 []  3 [x]  4   6. Eating meals? []  1 []  2 []  3 [x]  4   © 2007, Trustees of [facility], under license to [employer]. All rights reserved     Score: 24/24     Interpretation of Tool:  Represents clinically-significant functional categories (i.e. Activities of daily living).   Percentage of Impairment CH    0%   CI    1-19% CJ    20-39% CK    40-59% CL    60-79% CM    80-99% CN     100%   AMPA  Score 6-24 24 23 20-22 15-19 10-14 7-9 6         Occupational Therapy Evaluation Charge Determination   History Examination Decision-Making   LOW Complexity : Brief history review  LOW Complexity : 1-3 performance deficits relating to physical, cognitive , or psychosocial skils that result in activity limitations and / or participation restrictions  LOW Complexity : No comorbidities that affect functional and no verbal or physical assistance needed to complete eval tasks       Based on the above components, the patient evaluation is determined to be of the following complexity level: LOW   Pain Rating:  No pain reported    Activity Tolerance:   Good  Please refer to the flowsheet for vital signs taken during this treatment. After treatment patient left in no apparent distress:    seated EOB with nurse call light within reach    COMMUNICATION/EDUCATION:   The patients plan of care was discussed with: Registered nurse.      Thank you for this referral.  Tom Olguin  Time Calculation: 22 mins

## 2021-06-07 LAB
ALBUMIN SERPL-MCNC: 2.9 G/DL (ref 3.5–5)
ALBUMIN/GLOB SERPL: 0.6 {RATIO} (ref 1.1–2.2)
ALP SERPL-CCNC: 67 U/L (ref 45–117)
ALT SERPL-CCNC: 38 U/L (ref 12–78)
ANION GAP SERPL CALC-SCNC: 5 MMOL/L (ref 5–15)
AST SERPL W P-5'-P-CCNC: 24 U/L (ref 15–37)
BASOPHILS # BLD: 0 K/UL (ref 0–0.1)
BASOPHILS NFR BLD: 0 % (ref 0–1)
BILIRUB SERPL-MCNC: 0.3 MG/DL (ref 0.2–1)
BUN SERPL-MCNC: 22 MG/DL (ref 6–20)
BUN/CREAT SERPL: 18 (ref 12–20)
CA-I BLD-MCNC: 9.2 MG/DL (ref 8.5–10.1)
CHLORIDE SERPL-SCNC: 108 MMOL/L (ref 97–108)
CO2 SERPL-SCNC: 28 MMOL/L (ref 21–32)
CREAT SERPL-MCNC: 1.19 MG/DL (ref 0.7–1.3)
DIFFERENTIAL METHOD BLD: ABNORMAL
EOSINOPHIL # BLD: 0.3 K/UL (ref 0–0.4)
EOSINOPHIL NFR BLD: 5 % (ref 0–7)
ERYTHROCYTE [DISTWIDTH] IN BLOOD BY AUTOMATED COUNT: 15 % (ref 11.5–14.5)
GLOBULIN SER CALC-MCNC: 4.9 G/DL (ref 2–4)
GLUCOSE BLD STRIP.AUTO-MCNC: 122 MG/DL (ref 65–117)
GLUCOSE BLD STRIP.AUTO-MCNC: 124 MG/DL (ref 65–117)
GLUCOSE BLD STRIP.AUTO-MCNC: 140 MG/DL (ref 65–117)
GLUCOSE SERPL-MCNC: 76 MG/DL (ref 65–100)
HCT VFR BLD AUTO: 38.9 % (ref 36.6–50.3)
HGB BLD-MCNC: 11.7 G/DL (ref 12.1–17)
IMM GRANULOCYTES # BLD AUTO: 0 K/UL (ref 0–0.04)
IMM GRANULOCYTES NFR BLD AUTO: 0 % (ref 0–0.5)
LYMPHOCYTES # BLD: 1.5 K/UL (ref 0.8–3.5)
LYMPHOCYTES NFR BLD: 23 % (ref 12–49)
MCH RBC QN AUTO: 27.7 PG (ref 26–34)
MCHC RBC AUTO-ENTMCNC: 30.1 G/DL (ref 30–36.5)
MCV RBC AUTO: 92.2 FL (ref 80–99)
MONOCYTES # BLD: 0.6 K/UL (ref 0–1)
MONOCYTES NFR BLD: 9 % (ref 5–13)
NEUTS SEG # BLD: 4.2 K/UL (ref 1.8–8)
NEUTS SEG NFR BLD: 63 % (ref 32–75)
NRBC # BLD: 0 K/UL (ref 0–0.01)
NRBC BLD-RTO: 0 PER 100 WBC
PERFORMED BY, TECHID: ABNORMAL
PLATELET # BLD AUTO: 300 K/UL (ref 150–400)
PMV BLD AUTO: 11.3 FL (ref 8.9–12.9)
POTASSIUM SERPL-SCNC: 3.7 MMOL/L (ref 3.5–5.1)
PROT SERPL-MCNC: 7.8 G/DL (ref 6.4–8.2)
RBC # BLD AUTO: 4.22 M/UL (ref 4.1–5.7)
SODIUM SERPL-SCNC: 141 MMOL/L (ref 136–145)
WBC # BLD AUTO: 6.6 K/UL (ref 4.1–11.1)

## 2021-06-07 PROCEDURE — 36415 COLL VENOUS BLD VENIPUNCTURE: CPT

## 2021-06-07 PROCEDURE — 74011250637 HC RX REV CODE- 250/637: Performed by: FAMILY MEDICINE

## 2021-06-07 PROCEDURE — 94640 AIRWAY INHALATION TREATMENT: CPT

## 2021-06-07 PROCEDURE — 93970 EXTREMITY STUDY: CPT | Performed by: SURGERY

## 2021-06-07 PROCEDURE — 80053 COMPREHEN METABOLIC PANEL: CPT

## 2021-06-07 PROCEDURE — 85025 COMPLETE CBC W/AUTO DIFF WBC: CPT

## 2021-06-07 PROCEDURE — 74011250636 HC RX REV CODE- 250/636: Performed by: FAMILY MEDICINE

## 2021-06-07 PROCEDURE — 65270000029 HC RM PRIVATE

## 2021-06-07 PROCEDURE — 74011250637 HC RX REV CODE- 250/637

## 2021-06-07 PROCEDURE — 99232 SBSQ HOSP IP/OBS MODERATE 35: CPT | Performed by: INTERNAL MEDICINE

## 2021-06-07 PROCEDURE — 82962 GLUCOSE BLOOD TEST: CPT

## 2021-06-07 PROCEDURE — 74011000250 HC RX REV CODE- 250: Performed by: INTERNAL MEDICINE

## 2021-06-07 PROCEDURE — 74011000258 HC RX REV CODE- 258: Performed by: FAMILY MEDICINE

## 2021-06-07 PROCEDURE — 74011250637 HC RX REV CODE- 250/637: Performed by: SURGERY

## 2021-06-07 PROCEDURE — 74011636637 HC RX REV CODE- 636/637: Performed by: INTERNAL MEDICINE

## 2021-06-07 PROCEDURE — 77010033678 HC OXYGEN DAILY

## 2021-06-07 PROCEDURE — 94760 N-INVAS EAR/PLS OXIMETRY 1: CPT

## 2021-06-07 RX ORDER — NYSTATIN 100000 U/G
CREAM TOPICAL 2 TIMES DAILY
Status: DISCONTINUED | OUTPATIENT
Start: 2021-06-07 | End: 2021-06-10 | Stop reason: HOSPADM

## 2021-06-07 RX ADMIN — OXYCODONE HYDROCHLORIDE 10 MG: 10 TABLET ORAL at 12:48

## 2021-06-07 RX ADMIN — NYSTATIN: 100000 CREAM TOPICAL at 20:30

## 2021-06-07 RX ADMIN — FLUTICASONE PROPIONATE 2 SPRAY: 50 SPRAY, METERED NASAL at 09:06

## 2021-06-07 RX ADMIN — GABAPENTIN 300 MG: 300 CAPSULE ORAL at 09:05

## 2021-06-07 RX ADMIN — ASPIRIN 81 MG: 81 TABLET, CHEWABLE ORAL at 09:05

## 2021-06-07 RX ADMIN — Medication 250 MG: at 20:33

## 2021-06-07 RX ADMIN — INSULIN GLARGINE 80 UNITS: 100 INJECTION, SOLUTION SUBCUTANEOUS at 09:33

## 2021-06-07 RX ADMIN — OXYCODONE HYDROCHLORIDE 10 MG: 10 TABLET ORAL at 05:48

## 2021-06-07 RX ADMIN — INSULIN GLARGINE 32 UNITS: 100 INJECTION, SOLUTION SUBCUTANEOUS at 20:31

## 2021-06-07 RX ADMIN — BUMETANIDE 1 MG: 0.25 INJECTION INTRAMUSCULAR; INTRAVENOUS at 09:05

## 2021-06-07 RX ADMIN — NYSTATIN: 100000 POWDER TOPICAL at 09:33

## 2021-06-07 RX ADMIN — MULTIVITAMIN TABLET 1 TABLET: TABLET at 09:05

## 2021-06-07 RX ADMIN — GABAPENTIN 300 MG: 300 CAPSULE ORAL at 12:48

## 2021-06-07 RX ADMIN — OXYCODONE HYDROCHLORIDE 10 MG: 10 TABLET ORAL at 18:16

## 2021-06-07 RX ADMIN — Medication 800 UNITS: at 20:33

## 2021-06-07 RX ADMIN — BUMETANIDE 1 MG: 0.25 INJECTION INTRAMUSCULAR; INTRAVENOUS at 20:29

## 2021-06-07 RX ADMIN — OXYCODONE HYDROCHLORIDE AND ACETAMINOPHEN 1000 MG: 500 TABLET ORAL at 20:33

## 2021-06-07 RX ADMIN — Medication 250 MG: at 09:05

## 2021-06-07 RX ADMIN — PIPERACILLIN AND TAZOBACTAM 3.38 G: 3; .375 INJECTION, POWDER, LYOPHILIZED, FOR SOLUTION INTRAVENOUS at 14:12

## 2021-06-07 RX ADMIN — PIPERACILLIN AND TAZOBACTAM 3.38 G: 3; .375 INJECTION, POWDER, LYOPHILIZED, FOR SOLUTION INTRAVENOUS at 20:28

## 2021-06-07 RX ADMIN — PIPERACILLIN AND TAZOBACTAM 3.38 G: 3; .375 INJECTION, POWDER, LYOPHILIZED, FOR SOLUTION INTRAVENOUS at 09:05

## 2021-06-07 RX ADMIN — GABAPENTIN 300 MG: 300 CAPSULE ORAL at 18:16

## 2021-06-07 RX ADMIN — COLCHICINE 0.6 MG: 0.6 TABLET, FILM COATED ORAL at 09:05

## 2021-06-07 RX ADMIN — GABAPENTIN 300 MG: 300 CAPSULE ORAL at 20:33

## 2021-06-07 RX ADMIN — METFORMIN HYDROCHLORIDE 1000 MG: 500 TABLET ORAL at 18:16

## 2021-06-07 RX ADMIN — Medication 800 UNITS: at 09:05

## 2021-06-07 RX ADMIN — OXYCODONE HYDROCHLORIDE AND ACETAMINOPHEN 1000 MG: 500 TABLET ORAL at 09:05

## 2021-06-07 RX ADMIN — FERROUS SULFATE TAB 325 MG (65 MG ELEMENTAL FE) 325 MG: 325 (65 FE) TAB at 09:05

## 2021-06-07 RX ADMIN — TAMSULOSIN HYDROCHLORIDE 0.4 MG: 0.4 CAPSULE ORAL at 09:05

## 2021-06-07 NOTE — PROGRESS NOTES
General Daily Progress Note          Patient Name:   Viky Villa       YOB: 1955       Age:  76 y.o. Admit Date: 6/1/2021      Subjective:     Patient is G 88 y.o. year old male with a history of COPD, diabetes, gout, hypertension, arthritis, and sleep apnea. Patient has venous ulcers on his lower legs bilaterally that were being treated by wound care for several months with no improvement. Wound care recommended admission to the hospital for multidisplinary management of his venous ulcers. He was seen in the ER 6/1 and admitted. Patient is alert, awake, and in no acute distress. He is laying in the bed with his Trilogy. He is still complaining of leg pain. He is also complaining of left groin itching, that has not improved with nystatin use.             Objective:     Visit Vitals  /69 (BP 1 Location: Right arm, BP Patient Position: At rest)   Pulse 91   Temp 97.6 °F (36.4 °C)   Resp 20   Ht 5' 11\" (1.803 m)   Wt (!) 172.6 kg (380 lb 6.5 oz)   SpO2 98%   BMI 53.06 kg/m²        Recent Results (from the past 24 hour(s))   GLUCOSE, POC    Collection Time: 06/06/21  1:48 PM   Result Value Ref Range    Glucose (POC) 152 (H) 65 - 117 mg/dL    Performed by [address] [place] [address], POC    Collection Time: 06/06/21  4:08 PM   Result Value Ref Range    Glucose (POC) 101 65 - 117 mg/dL    Performed by [facility])    GLUCOSE, POC    Collection Time: 06/06/21  9:21 PM   Result Value Ref Range    Glucose (POC) 128 (H) 65 - 117 mg/dL    Performed by [facility])    CBC WITH AUTOMATED DIFF    Collection Time: 06/07/21  6:59 AM   Result Value Ref Range    WBC 6.6 4.1 - 11.1 K/uL    RBC 4.22 4.10 - 5.70 M/uL    HGB 11.7 (L) 12.1 - 17.0 g/dL    HCT 38.9 36.6 - 50.3 %    MCV 92.2 80.0 - 99.0 FL    MCH 27.7 26.0 - 34.0 PG    MCHC 30.1 30.0 - 36.5 g/dL    RDW 15.0 (H) 11.5 - 14.5 %    PLATELET 193 968 - 119 K/uL    MPV 11.3 8.9 - 12.9 FL    NRBC 0.0 0.0  WBC    ABSOLUTE NRBC 0.00 0.00 - 0.01 K/uL    NEUTROPHILS 63 32 - 75 %    LYMPHOCYTES 23 12 - 49 %    MONOCYTES 9 5 - 13 %    EOSINOPHILS 5 0 - 7 %    BASOPHILS 0 0 - 1 %    IMMATURE GRANULOCYTES 0 0 - 0.5 %    ABS. NEUTROPHILS 4.2 1.8 - 8.0 K/UL    ABS. LYMPHOCYTES 1.5 0.8 - 3.5 K/UL    ABS. MONOCYTES 0.6 0.0 - 1.0 K/UL    ABS. EOSINOPHILS 0.3 0.0 - 0.4 K/UL    ABS. BASOPHILS 0.0 0.0 - 0.1 K/UL    ABS. IMM. GRANS. 0.0 0.00 - 0.04 K/UL    DF AUTOMATED     METABOLIC PANEL, COMPREHENSIVE    Collection Time: 06/07/21  6:59 AM   Result Value Ref Range    Sodium 141 136 - 145 mmol/L    Potassium 3.7 3.5 - 5.1 mmol/L    Chloride 108 97 - 108 mmol/L    CO2 28 21 - 32 mmol/L    Anion gap 5 5 - 15 mmol/L    Glucose 76 65 - 100 mg/dL    BUN 22 (H) 6 - 20 mg/dL    Creatinine 1.19 0.70 - 1.30 mg/dL    BUN/Creatinine ratio 18 12 - 20      GFR est AA >60 >60 ml/min/1.73m2    GFR est non-AA >60 >60 ml/min/1.73m2    Calcium 9.2 8.5 - 10.1 mg/dL    Bilirubin, total 0.3 0.2 - 1.0 mg/dL    AST (SGOT) 24 15 - 37 U/L    ALT (SGPT) 38 12 - 78 U/L    Alk. phosphatase 67 45 - 117 U/L    Protein, total 7.8 6.4 - 8.2 g/dL    Albumin 2.9 (L) 3.5 - 5.0 g/dL    Globulin 4.9 (H) 2.0 - 4.0 g/dL    A-G Ratio 0.6 (L) 1.1 - 2.2     GLUCOSE, POC    Collection Time: 06/07/21 11:43 AM   Result Value Ref Range    Glucose (POC) 122 (H) 65 - 117 mg/dL    Performed by Kiley Lockhart      [unfilled]      Review of Systems    Constitutional: Negative for chills and fever. HENT: Negative. Eyes: Negative. Respiratory: Negative. Cardiovascular: Negative. Gastrointestinal: Negative for abdominal pain and nausea. Skin: left groin itching. Neurological: Negative. Physical Exam:      Constitutional: pt is oriented to person, place, and time. HENT:   Head: Normocephalic and atraumatic. Eyes: Pupils are equal, round, and reactive to light. EOM are normal.   Cardiovascular: Normal rate, regular rhythm and normal heart sounds.    Pulmonary/Chest: Breath sounds normal. No wheezes. No rales. Exhibits no tenderness. Abdominal: Soft. Bowel sounds are normal. There is no abdominal tenderness. There is no rebound and no guarding. Skin: flaking skin in left groin with mild erythema   Musculoskeletal: Normal range of motion. Neurological: pt is alert and oriented to person, place, and time. Extremities: Venous ulcers R and L lateral lower legs with surrounding discoloration of lower extremities, currently covered with wound dressings       XR CHEST PORT   Final Result   Stable mild-to-moderate enlargement of cardiomediastinal silhouette. Mediastinum underpenetrated with suboptimal visualization of right atrial and   right ventricular pacing leads. There is obscuration of a portion of the left   hemithorax by the generator device. No pneumothorax. No evidence of pulmonary   edema, air space pneumonia, or pleural effusion.            Recent Results (from the past 24 hour(s))   GLUCOSE, POC    Collection Time: 06/06/21  1:48 PM   Result Value Ref Range    Glucose (POC) 152 (H) 65 - 117 mg/dL    Performed by [address] [place] [address], POC    Collection Time: 06/06/21  4:08 PM   Result Value Ref Range    Glucose (POC) 101 65 - 117 mg/dL    Performed by Normatomás Cunningham([facility])    GLUCOSE, POC    Collection Time: 06/06/21  9:21 PM   Result Value Ref Range    Glucose (POC) 128 (H) 65 - 117 mg/dL    Performed by Normatomás Cunningham([facility])    CBC WITH AUTOMATED DIFF    Collection Time: 06/07/21  6:59 AM   Result Value Ref Range    WBC 6.6 4.1 - 11.1 K/uL    RBC 4.22 4.10 - 5.70 M/uL    HGB 11.7 (L) 12.1 - 17.0 g/dL    HCT 38.9 36.6 - 50.3 %    MCV 92.2 80.0 - 99.0 FL    MCH 27.7 26.0 - 34.0 PG    MCHC 30.1 30.0 - 36.5 g/dL    RDW 15.0 (H) 11.5 - 14.5 %    PLATELET 586 347 - 468 K/uL    MPV 11.3 8.9 - 12.9 FL    NRBC 0.0 0.0  WBC    ABSOLUTE NRBC 0.00 0.00 - 0.01 K/uL    NEUTROPHILS 63 32 - 75 %    LYMPHOCYTES 23 12 - 49 %    MONOCYTES 9 5 - 13 %    EOSINOPHILS 5 0 - 7 %    BASOPHILS 0 0 - 1 %    IMMATURE GRANULOCYTES 0 0 - 0.5 %    ABS. NEUTROPHILS 4.2 1.8 - 8.0 K/UL    ABS. LYMPHOCYTES 1.5 0.8 - 3.5 K/UL    ABS. MONOCYTES 0.6 0.0 - 1.0 K/UL    ABS. EOSINOPHILS 0.3 0.0 - 0.4 K/UL    ABS. BASOPHILS 0.0 0.0 - 0.1 K/UL    ABS. IMM. GRANS. 0.0 0.00 - 0.04 K/UL    DF AUTOMATED     METABOLIC PANEL, COMPREHENSIVE    Collection Time: 06/07/21  6:59 AM   Result Value Ref Range    Sodium 141 136 - 145 mmol/L    Potassium 3.7 3.5 - 5.1 mmol/L    Chloride 108 97 - 108 mmol/L    CO2 28 21 - 32 mmol/L    Anion gap 5 5 - 15 mmol/L    Glucose 76 65 - 100 mg/dL    BUN 22 (H) 6 - 20 mg/dL    Creatinine 1.19 0.70 - 1.30 mg/dL    BUN/Creatinine ratio 18 12 - 20      GFR est AA >60 >60 ml/min/1.73m2    GFR est non-AA >60 >60 ml/min/1.73m2    Calcium 9.2 8.5 - 10.1 mg/dL    Bilirubin, total 0.3 0.2 - 1.0 mg/dL    AST (SGOT) 24 15 - 37 U/L    ALT (SGPT) 38 12 - 78 U/L    Alk.  phosphatase 67 45 - 117 U/L    Protein, total 7.8 6.4 - 8.2 g/dL    Albumin 2.9 (L) 3.5 - 5.0 g/dL    Globulin 4.9 (H) 2.0 - 4.0 g/dL    A-G Ratio 0.6 (L) 1.1 - 2.2     GLUCOSE, POC    Collection Time: 06/07/21 11:43 AM   Result Value Ref Range    Glucose (POC) 122 (H) 65 - 117 mg/dL    Performed by Renetta Quinteros        Results     Procedure Component Value Units Date/Time    CULTURE, BLOOD #1 [[id number]] Collected: 06/02/21 1231    Order Status: Completed Specimen: Blood Updated: 06/07/21 0912     Special Requests: No Special Requests        Culture result: No growth 4 days       CULTURE, BLOOD #2 [[id number]] Collected: 06/02/21 1231    Order Status: Completed Specimen: Blood Updated: 06/07/21 0912     Special Requests: No Special Requests        Culture result: No growth 4 days       CULTURE, BLOOD, PAIRED [[id number]] Collected: 06/01/21 2015    Order Status: Completed Specimen: Blood Updated: 06/07/21 0912     Special Requests: No Special Requests        Culture result: No growth 5 days              Labs:     Recent Labs     06/07/21  0659 06/06/21  1111   WBC 6.6 6.7   HGB 11.7* 11.4*   HCT 38.9 38.4    323     Recent Labs     06/07/21  0659 06/06/21  1111 06/05/21  1502    139 142   K 3.7 4.1 4.4    106 108   CO2 28 28 24   BUN 22* 26* 29*   CREA 1.19 1.34* 1.42*   GLU 76 157* 108*   CA 9.2 9.1 8.9     Recent Labs     06/07/21  0659 06/06/21  1111 06/05/21  1502   ALT 38 42 43   AP 67 73 74   TBILI 0.3 0.4 0.4   TP 7.8 8.0 7.8   ALB 2.9* 3.0* 3.0*   GLOB 4.9* 5.0* 4.8*     No results for input(s): INR, PTP, APTT, INREXT, INREXT in the last 72 hours. No results for input(s): FE, TIBC, PSAT, FERR in the last 72 hours. No results found for: FOL, RBCF   No results for input(s): PH, PCO2, PO2 in the last 72 hours. No results for input(s): CPK, CKNDX, TROIQ in the last 72 hours. No lab exists for component: CPKMB  No results found for: CHOL, CHOLX, CHLST, CHOLV, HDL, HDLP, LDL, LDLC, DLDLP, TGLX, TRIGL, TRIGP, CHHD, CHHDX  Lab Results   Component Value Date/Time    Glucose (POC) 122 (H) 06/07/2021 11:43 AM    Glucose (POC) 128 (H) 06/06/2021 09:21 PM    Glucose (POC) 101 06/06/2021 04:08 PM    Glucose (POC) 152 (H) 06/06/2021 01:48 PM    Glucose (POC) 153 (H) 06/06/2021 09:37 AM     No results found for: COLOR, APPRN, SPGRU, REFSG, KENTON, PROTU, GLUCU, KETU, BILU, UROU, DELMY, LEUKU, GLUKE, EPSU, BACTU, WBCU, RBCU, CASTS, UCRY      Assessment:     Acute cellulitis of both legs  Venous stasis ulcer  Chronic hypoxemic respiratory failure  Acute on chronic kidney disease  History of CHF diastolic dysfunction  COPD  Type 2 diabetes  Hypertension  Gout  Arthritis   Sleep Apnea   Morbid obesity  Superficial Thrombosis of left greater saphenous vein   Ejection fraction about 40 to 45%         Plan:     Check echo. Continue IV diuresis. Stop HCTZ and switch to Bumex IV 1 mg twice a day  Continue aspirin  Monitor kidney function  Stop colchicine due to abnormal kidney function.    Daily weights and ins and outs     Seen by the cardiology he recommends interrogation for pacemaker       Per ID:   Continue vancomycin and zosyn pending repeat wound cultures. Restart home dose of Gabapentin. Leg elevation to help with venous return.    Routine labs in morning.      Continue Trilogy.        In by the vascular surgeon's recommendations are       Patient was advised strongly that he needs to have both legs elevated in order for these 2 wounds to heal.  Once the swelling is a relatively under control and the secondary edema and cellulitis resolved we were planning on Profore dressing both lower extremity.  I have instructed nursing staff to keep both legs elevated 3 pillows at least if is tolerable.     Possible discharge in the morning     PT OT consult-discharge to [facility]       Current Facility-Administered Medications:     vitamin E acetate capsule 800 Units, 800 Units, Oral, BID, Moni Patel MD, 800 Units at 06/07/21 0905    metFORMIN (GLUCOPHAGE) tablet 1,000 mg, 1,000 mg, Oral, BID WITH MEALS, Mary Patel MD    ammonium lactate (AMLACTIN) 12 % cream, , Topical, PRN, Mary Patel MD, Given at 06/06/21 0951    insulin glargine (LANTUS) injection 32 Units, 32 Units, SubCUTAneous, QHS, Darren Maurer MD, 32 Units at 06/06/21 2139    insulin glargine (LANTUS) injection 80 Units, 80 Units, SubCUTAneous, DAILY, Darren Maurer MD, 80 Units at 06/07/21 0933    nystatin (MYCOSTATIN) 100,000 unit/gram powder, , Topical, BID, Moni Patel MD, Given at 06/07/21 0933    Saccharomyces boulardii (FLORASTOR) capsule 250 mg, 250 mg, Oral, BID, Moni Patel MD, 250 mg at 06/07/21 0905    colchicine tablet 0.6 mg, 0.6 mg, Oral, DAILY, Samson, Ananya Castellanos MD, 0.6 mg at 06/07/21 0905    piperacillin-tazobactam (ZOSYN) 3.375 g in 0.9% sodium chloride (MBP/ADV) 100 mL MBP, 3.375 g, IntraVENous, Q8H, Moni Patel MD, Last Rate: 25 mL/hr at 06/07/21 0905, 3.375 g at 06/07/21 0905    glucose chewable tablet 16 g, 4 Tablet, Oral, PRN, Tigre Ramos MD    dextrose (D50W) injection syrg 12.5-25 g, 25-50 mL, IntraVENous, PRN, Ellen Patel MD    glucagon (GLUCAGEN) injection 1 mg, 1 mg, IntraMUSCular, PRN, Ellen Patel MD    albuterol (PROVENTIL HFA, VENTOLIN HFA, PROAIR HFA) inhaler 2 Puff, 2 Puff, Inhalation, Q6H PRN, Ellen Patel MD    ascorbic acid (vitamin C) (VITAMIN C) tablet 1,000 mg, 1,000 mg, Oral, BID, Moni Patel MD, 1,000 mg at 06/07/21 0905    aspirin chewable tablet 81 mg, 81 mg, Oral, DAILY, Moni Patel MD, 81 mg at 06/07/21 8739    ferrous sulfate tablet 325 mg, 1 Tablet, Oral, ACB, Moni Patel MD, 325 mg at 06/07/21 0905    fluticasone propionate (FLONASE) 50 mcg/actuation nasal spray 2 Spray, 2 Spray, Both Nostrils, DAILY, Moni Patel MD, 2 Spray at 06/07/21 1319    gabapentin (NEURONTIN) capsule 300 mg, 300 mg, Oral, QID, Ellen Patel MD, 300 mg at 06/07/21 0636    multivitamin (ONE A DAY) tablet 1 Tablet, 1 Tablet, Oral, DAILY, Moni Patel MD, 1 Tablet at 06/07/21 0905    tamsulosin (FLOMAX) capsule 0.4 mg, 0.4 mg, Oral, DAILY, Moni Patel MD, 0.4 mg at 06/07/21 3130    tiotropium bromide (SPIRIVA RESPIMAT) 2.5 mcg /actuation, 5 mcg, Inhalation, DAILY, Moni Patel MD, 2 Puff at 06/07/21 0919    insulin lispro (HUMALOG) injection, , SubCUTAneous, AC&HS, Moni Patel MD, 3 Units at 06/06/21 1324    acetaminophen (TYLENOL) tablet 650 mg, 650 mg, Oral, Q6H PRN **OR** acetaminophen (TYLENOL) suppository 650 mg, 650 mg, Rectal, Q6H PRN, Ellen Patel MD    polyethylene glycol (MIRALAX) packet 17 g, 17 g, Oral, DAILY PRN, Ellen Patel MD    promethazine (PHENERGAN) tablet 12.5 mg, 12.5 mg, Oral, Q6H PRN **OR** ondansetron (ZOFRAN) injection 4 mg, 4 mg, IntraVENous, Q6H PRN, Moni Patel MD    oxyCODONE IR (ROXICODONE) tablet 10 mg, 10 mg, Oral, Q6H PRN, Moni Patel MD, 10 mg at 06/07/21 0548    bumetanide (BUMEX) injection 1 mg, 1 mg, IntraVENous, BID, Kari Spann MD, 1 mg at 06/07/21 5000

## 2021-06-07 NOTE — PROGRESS NOTES
General Daily Progress Note          Patient Name:   Dylan Fam       YOB: 1955       Age:  76 y.o. Admit Date: 6/1/2021      Subjective:     Patient is Y 28 y.o. year old male with a history of COPD, diabetes, gout, hypertension, arthritis, and sleep apnea. Patient has venous ulcers on his lower legs bilaterally that were being treated by wound care for several months with no improvement. Wound care recommended admission to the hospital for multidisplinary management of his venous ulcers. He was seen in the ER 6/1 and admitted. Patient is alert, awake, and in no acute distress. He is laying in the bed with his Trilogy. He is still complaining of leg pain. He is also complaining of left groin itching, that has not improved with nystatin use. Objective:     Visit Vitals  /69 (BP 1 Location: Right arm, BP Patient Position: At rest)   Pulse 91   Temp 97.6 °F (36.4 °C)   Resp 20   Ht 5' 11\" (1.803 m)   Wt (!) 172.6 kg (380 lb 6.5 oz)   SpO2 98%   BMI 53.06 kg/m²        Recent Results (from the past 24 hour(s))   GLUCOSE, POC    Collection Time: 06/06/21  9:37 AM   Result Value Ref Range    Glucose (POC) 153 (H) 65 - 117 mg/dL    Performed by Lonzell Bamberger    CBC WITH AUTOMATED DIFF    Collection Time: 06/06/21 11:11 AM   Result Value Ref Range    WBC 6.7 4.1 - 11.1 K/uL    RBC 4.07 (L) 4.10 - 5.70 M/uL    HGB 11.4 (L) 12.1 - 17.0 g/dL    HCT 38.4 36.6 - 50.3 %    MCV 94.3 80.0 - 99.0 FL    MCH 28.0 26.0 - 34.0 PG    MCHC 29.7 (L) 30.0 - 36.5 g/dL    RDW 15.0 (H) 11.5 - 14.5 %    PLATELET 365 369 - 653 K/uL    MPV 10.9 8.9 - 12.9 FL    NRBC 0.0 0.0  WBC    ABSOLUTE NRBC 0.00 0.00 - 0.01 K/uL    NEUTROPHILS 69 32 - 75 %    LYMPHOCYTES 19 12 - 49 %    MONOCYTES 7 5 - 13 %    EOSINOPHILS 5 0 - 7 %    BASOPHILS 0 0 - 1 %    IMMATURE GRANULOCYTES 0 0 - 0.5 %    ABS. NEUTROPHILS 4.5 1.8 - 8.0 K/UL    ABS. LYMPHOCYTES 1.3 0.8 - 3.5 K/UL    ABS.  MONOCYTES 0.5 0.0 - 1.0 K/UL ABS. EOSINOPHILS 0.4 0.0 - 0.4 K/UL    ABS. BASOPHILS 0.0 0.0 - 0.1 K/UL    ABS. IMM. GRANS. 0.0 0.00 - 0.04 K/UL    DF AUTOMATED     METABOLIC PANEL, COMPREHENSIVE    Collection Time: 06/06/21 11:11 AM   Result Value Ref Range    Sodium 139 136 - 145 mmol/L    Potassium 4.1 3.5 - 5.1 mmol/L    Chloride 106 97 - 108 mmol/L    CO2 28 21 - 32 mmol/L    Anion gap 5 5 - 15 mmol/L    Glucose 157 (H) 65 - 100 mg/dL    BUN 26 (H) 6 - 20 mg/dL    Creatinine 1.34 (H) 0.70 - 1.30 mg/dL    BUN/Creatinine ratio 19 12 - 20      GFR est AA >60 >60 ml/min/1.73m2    GFR est non-AA 53 (L) >60 ml/min/1.73m2    Calcium 9.1 8.5 - 10.1 mg/dL    Bilirubin, total 0.4 0.2 - 1.0 mg/dL    AST (SGOT) 28 15 - 37 U/L    ALT (SGPT) 42 12 - 78 U/L    Alk. phosphatase 73 45 - 117 U/L    Protein, total 8.0 6.4 - 8.2 g/dL    Albumin 3.0 (L) 3.5 - 5.0 g/dL    Globulin 5.0 (H) 2.0 - 4.0 g/dL    A-G Ratio 0.6 (L) 1.1 - 2.2     GLUCOSE, POC    Collection Time: 06/06/21  1:48 PM   Result Value Ref Range    Glucose (POC) 152 (H) 65 - 117 mg/dL    Performed by [address] [place] [address], POC    Collection Time: 06/06/21  4:08 PM   Result Value Ref Range    Glucose (POC) 101 65 - 117 mg/dL    Performed by Denilson Cunningham(PCT)    GLUCOSE, POC    Collection Time: 06/06/21  9:21 PM   Result Value Ref Range    Glucose (POC) 128 (H) 65 - 117 mg/dL    Performed by Denilson Cunningham([facility])    METABOLIC PANEL, COMPREHENSIVE    Collection Time: 06/07/21  6:59 AM   Result Value Ref Range    Sodium 141 136 - 145 mmol/L    Potassium 3.7 3.5 - 5.1 mmol/L    Chloride 108 97 - 108 mmol/L    CO2 28 21 - 32 mmol/L    Anion gap 5 5 - 15 mmol/L    Glucose 76 65 - 100 mg/dL    BUN 22 (H) 6 - 20 mg/dL    Creatinine 1.19 0.70 - 1.30 mg/dL    BUN/Creatinine ratio 18 12 - 20      GFR est AA >60 >60 ml/min/1.73m2    GFR est non-AA >60 >60 ml/min/1.73m2    Calcium 9.2 8.5 - 10.1 mg/dL    Bilirubin, total 0.3 0.2 - 1.0 mg/dL    AST (SGOT) 24 15 - 37 U/L    ALT (SGPT) 38 12 - 78 U/L    Alk. phosphatase 67 45 - 117 U/L    Protein, total 7.8 6.4 - 8.2 g/dL    Albumin 2.9 (L) 3.5 - 5.0 g/dL    Globulin 4.9 (H) 2.0 - 4.0 g/dL    A-G Ratio 0.6 (L) 1.1 - 2.2       [unfilled]      Review of Systems    Constitutional: Negative for chills and fever. HENT: Negative. Eyes: Negative. Respiratory: Negative. Cardiovascular: Negative. Gastrointestinal: Negative for abdominal pain and nausea. Skin: left groin itching. Neurological: Negative. Physical Exam:      Constitutional: pt is oriented to person, place, and time. HENT:   Head: Normocephalic and atraumatic. Eyes: Pupils are equal, round, and reactive to light. EOM are normal.   Cardiovascular: Normal rate, regular rhythm and normal heart sounds. Pulmonary/Chest: Breath sounds normal. No wheezes. No rales. Exhibits no tenderness. Abdominal: Soft. Bowel sounds are normal. There is no abdominal tenderness. There is no rebound and no guarding. Skin: flaking skin in left groin with mild erythema   Musculoskeletal: Normal range of motion. Neurological: pt is alert and oriented to person, place, and time. Extremities: Venous ulcers R and L lateral lower legs with surrounding discoloration of lower extremities, currently covered with wound dressings       XR CHEST PORT   Final Result   Stable mild-to-moderate enlargement of cardiomediastinal silhouette. Mediastinum underpenetrated with suboptimal visualization of right atrial and   right ventricular pacing leads. There is obscuration of a portion of the left   hemithorax by the generator device. No pneumothorax. No evidence of pulmonary   edema, air space pneumonia, or pleural effusion.            Recent Results (from the past 24 hour(s))   GLUCOSE, POC    Collection Time: 06/06/21  9:37 AM   Result Value Ref Range    Glucose (POC) 153 (H) 65 - 117 mg/dL    Performed by Linh Mccrary    CBC WITH AUTOMATED DIFF    Collection Time: 06/06/21 11:11 AM   Result Value Ref Range    WBC 6.7 4.1 - 11.1 K/uL    RBC 4.07 (L) 4.10 - 5.70 M/uL    HGB 11.4 (L) 12.1 - 17.0 g/dL    HCT 38.4 36.6 - 50.3 %    MCV 94.3 80.0 - 99.0 FL    MCH 28.0 26.0 - 34.0 PG    MCHC 29.7 (L) 30.0 - 36.5 g/dL    RDW 15.0 (H) 11.5 - 14.5 %    PLATELET 412 104 - 069 K/uL    MPV 10.9 8.9 - 12.9 FL    NRBC 0.0 0.0  WBC    ABSOLUTE NRBC 0.00 0.00 - 0.01 K/uL    NEUTROPHILS 69 32 - 75 %    LYMPHOCYTES 19 12 - 49 %    MONOCYTES 7 5 - 13 %    EOSINOPHILS 5 0 - 7 %    BASOPHILS 0 0 - 1 %    IMMATURE GRANULOCYTES 0 0 - 0.5 %    ABS. NEUTROPHILS 4.5 1.8 - 8.0 K/UL    ABS. LYMPHOCYTES 1.3 0.8 - 3.5 K/UL    ABS. MONOCYTES 0.5 0.0 - 1.0 K/UL    ABS. EOSINOPHILS 0.4 0.0 - 0.4 K/UL    ABS. BASOPHILS 0.0 0.0 - 0.1 K/UL    ABS. IMM. GRANS. 0.0 0.00 - 0.04 K/UL    DF AUTOMATED     METABOLIC PANEL, COMPREHENSIVE    Collection Time: 06/06/21 11:11 AM   Result Value Ref Range    Sodium 139 136 - 145 mmol/L    Potassium 4.1 3.5 - 5.1 mmol/L    Chloride 106 97 - 108 mmol/L    CO2 28 21 - 32 mmol/L    Anion gap 5 5 - 15 mmol/L    Glucose 157 (H) 65 - 100 mg/dL    BUN 26 (H) 6 - 20 mg/dL    Creatinine 1.34 (H) 0.70 - 1.30 mg/dL    BUN/Creatinine ratio 19 12 - 20      GFR est AA >60 >60 ml/min/1.73m2    GFR est non-AA 53 (L) >60 ml/min/1.73m2    Calcium 9.1 8.5 - 10.1 mg/dL    Bilirubin, total 0.4 0.2 - 1.0 mg/dL    AST (SGOT) 28 15 - 37 U/L    ALT (SGPT) 42 12 - 78 U/L    Alk.  phosphatase 73 45 - 117 U/L    Protein, total 8.0 6.4 - 8.2 g/dL    Albumin 3.0 (L) 3.5 - 5.0 g/dL    Globulin 5.0 (H) 2.0 - 4.0 g/dL    A-G Ratio 0.6 (L) 1.1 - 2.2     GLUCOSE, POC    Collection Time: 06/06/21  1:48 PM   Result Value Ref Range    Glucose (POC) 152 (H) 65 - 117 mg/dL    Performed by [address] [place] [address], POC    Collection Time: 06/06/21  4:08 PM   Result Value Ref Range    Glucose (POC) 101 65 - 117 mg/dL    Performed by Eric Cunningham([facility])    GLUCOSE, POC    Collection Time: 06/06/21  9:21 PM   Result Value Ref Range    Glucose (POC) 128 (H) 65 - 117 mg/dL    Performed by Raya Cunningham(PCT)    METABOLIC PANEL, COMPREHENSIVE    Collection Time: 06/07/21  6:59 AM   Result Value Ref Range    Sodium 141 136 - 145 mmol/L    Potassium 3.7 3.5 - 5.1 mmol/L    Chloride 108 97 - 108 mmol/L    CO2 28 21 - 32 mmol/L    Anion gap 5 5 - 15 mmol/L    Glucose 76 65 - 100 mg/dL    BUN 22 (H) 6 - 20 mg/dL    Creatinine 1.19 0.70 - 1.30 mg/dL    BUN/Creatinine ratio 18 12 - 20      GFR est AA >60 >60 ml/min/1.73m2    GFR est non-AA >60 >60 ml/min/1.73m2    Calcium 9.2 8.5 - 10.1 mg/dL    Bilirubin, total 0.3 0.2 - 1.0 mg/dL    AST (SGOT) 24 15 - 37 U/L    ALT (SGPT) 38 12 - 78 U/L    Alk.  phosphatase 67 45 - 117 U/L    Protein, total 7.8 6.4 - 8.2 g/dL    Albumin 2.9 (L) 3.5 - 5.0 g/dL    Globulin 4.9 (H) 2.0 - 4.0 g/dL    A-G Ratio 0.6 (L) 1.1 - 2.2         Results     Procedure Component Value Units Date/Time    CULTURE, BLOOD #1 [[id number]] Collected: 06/02/21 1231    Order Status: Completed Specimen: Blood Updated: 06/06/21 0719     Special Requests: No Special Requests        Culture result: No growth 3 days       CULTURE, BLOOD #2 [[id number]] Collected: 06/02/21 1231    Order Status: Completed Specimen: Blood Updated: 06/06/21 0719     Special Requests: No Special Requests        Culture result: No growth 3 days       CULTURE, BLOOD, PAIRED [[id number]] Collected: 06/01/21 2015    Order Status: Completed Specimen: Blood Updated: 06/06/21 0719     Special Requests: No Special Requests        Culture result: No growth 4 days              Labs:     Recent Labs     06/06/21  1111 06/05/21  1502   WBC 6.7 7.4   HGB 11.4* 11.0*   HCT 38.4 37.4    343     Recent Labs     06/07/21  0659 06/06/21  1111 06/05/21  1502    139 142   K 3.7 4.1 4.4    106 108   CO2 28 28 24   BUN 22* 26* 29*   CREA 1.19 1.34* 1.42*   GLU 76 157* 108*   CA 9.2 9.1 8.9     Recent Labs     06/07/21  0659 06/06/21  1111 06/05/21  1502   ALT 38 42 43   AP 67 73 74   TBILI 0.3 0.4 0.4   TP 7.8 8.0 7.8   ALB 2.9* 3.0* 3.0*   GLOB 4.9* 5.0* 4.8*     No results for input(s): INR, PTP, APTT, INREXT in the last 72 hours. No results for input(s): FE, TIBC, PSAT, FERR in the last 72 hours. No results found for: FOL, RBCF   No results for input(s): PH, PCO2, PO2 in the last 72 hours. No results for input(s): CPK, CKNDX, TROIQ in the last 72 hours. No lab exists for component: CPKMB  No results found for: CHOL, CHOLX, CHLST, CHOLV, HDL, HDLP, LDL, LDLC, DLDLP, TGLX, TRIGL, TRIGP, CHHD, CHHDX  Lab Results   Component Value Date/Time    Glucose (POC) 128 (H) 06/06/2021 09:21 PM    Glucose (POC) 101 06/06/2021 04:08 PM    Glucose (POC) 152 (H) 06/06/2021 01:48 PM    Glucose (POC) 153 (H) 06/06/2021 09:37 AM    Glucose (POC) 110 06/05/2021 08:47 PM     No results found for: COLOR, APPRN, SPGRU, REFSG, KENTON, PROTU, GLUCU, KETU, BILU, UROU, DELMY, LEUKU, GLUKE, EPSU, BACTU, WBCU, RBCU, CASTS, UCRY      Assessment:     Acute cellulitis of both legs  Venous stasis ulcer  Chronic hypoxemic respiratory failure  Acute on chronic kidney disease  History of CHF diastolic dysfunction  COPD  Type 2 diabetes  Hypertension  Gout  Arthritis   Sleep Apnea   Morbid obesity  Superficial Thrombosis of left greater saphenous vein   Ejection fraction about 40 to 45%         Plan:     Check echo. Continue IV diuresis. Stop HCTZ and switch to Bumex IV 1 mg twice a day  Continue aspirin  Monitor kidney function  Stop colchicine due to abnormal kidney function. Daily weights and ins and outs        Per ID:   Continue vancomycin and zosyn pending repeat wound cultures. Restart home dose of Gabapentin. Leg elevation to help with venous return.    Routine labs in morning.      Continue Trilogy.        In by the vascular surgeon's recommendations are       Patient was advised strongly that he needs to have both legs elevated in order for these 2 wounds to heal.  Once the swelling is a relatively under control and the secondary edema and cellulitis resolved we were planning on Profore dressing both lower extremity.  I have instructed nursing staff to keep both legs elevated 3 pillows at least if is tolerable.     Possible discharge in the morning     PT OT consult-discharge to OT       Current Facility-Administered Medications:     vitamin E acetate capsule 800 Units, 800 Units, Oral, BID, Moni Patel MD, 800 Units at 06/07/21 0905    metFORMIN (GLUCOPHAGE) tablet 1,000 mg, 1,000 mg, Oral, BID WITH MEALS, Mary Patel MD    ammonium lactate (AMLACTIN) 12 % cream, , Topical, PRN, Mary Patel MD, Given at 06/06/21 0951    insulin glargine (LANTUS) injection 32 Units, 32 Units, SubCUTAneous, QHS, Darren Maurer MD, 32 Units at 06/06/21 2139    insulin glargine (LANTUS) injection 80 Units, 80 Units, SubCUTAneous, DAILY, Darren Maurer MD, 80 Units at 06/06/21 0950    nystatin (MYCOSTATIN) 100,000 unit/gram powder, , Topical, BID, Moni Patel MD, Given at 06/06/21 2100    Saccharomyces boulardii (FLORASTOR) capsule 250 mg, 250 mg, Oral, BID, Moni Patel MD, 250 mg at 06/07/21 9951    colchicine tablet 0.6 mg, 0.6 mg, Oral, DAILY, Samson, Ananya Castellanos MD, 0.6 mg at 06/07/21 0905    piperacillin-tazobactam (ZOSYN) 3.375 g in 0.9% sodium chloride (MBP/ADV) 100 mL MBP, 3.375 g, IntraVENous, Q8H, Moni Patel MD, Last Rate: 25 mL/hr at 06/07/21 0905, 3.375 g at 06/07/21 0905    glucose chewable tablet 16 g, 4 Tablet, Oral, PRN, Moni Patel MD    dextrose (D50W) injection syrg 12.5-25 g, 25-50 mL, IntraVENous, PRN, Mary Patel MD    glucagon (GLUCAGEN) injection 1 mg, 1 mg, IntraMUSCular, PRN, Mary Patel MD    albuterol (PROVENTIL HFA, VENTOLIN HFA, PROAIR HFA) inhaler 2 Puff, 2 Puff, Inhalation, Q6H PRN, Moni Patel MD    ascorbic acid (vitamin C) (VITAMIN C) tablet 1,000 mg, 1,000 mg, Oral, BID, Moni Patel MD, 1,000 mg at 06/07/21 0905    aspirin chewable tablet 81 mg, 81 mg, Oral, DAILY, Moni Patel MD, 81 mg at 06/07/21 7713    ferrous sulfate tablet 325 mg, 1 Tablet, Oral, ACB, Moni Patel MD, 325 mg at 06/07/21 0905    fluticasone propionate (FLONASE) 50 mcg/actuation nasal spray 2 Spray, 2 Spray, Both Nostrils, DAILY, Moni Patel MD, 2 Spray at 06/07/21 5664    gabapentin (NEURONTIN) capsule 300 mg, 300 mg, Oral, QID, Edgard Patel MD, 300 mg at 06/07/21 8879    multivitamin (ONE A DAY) tablet 1 Tablet, 1 Tablet, Oral, DAILY, Moni Patel MD, 1 Tablet at 06/07/21 0905    tamsulosin (FLOMAX) capsule 0.4 mg, 0.4 mg, Oral, DAILY, Moni Patel MD, 0.4 mg at 06/07/21 4838    tiotropium bromide (SPIRIVA RESPIMAT) 2.5 mcg /actuation, 5 mcg, Inhalation, DAILY, Moin Patel MD, 2 Puff at 06/06/21 5205    insulin lispro (HUMALOG) injection, , SubCUTAneous, AC&HS, Moni Patel MD, 3 Units at 06/06/21 1324    acetaminophen (TYLENOL) tablet 650 mg, 650 mg, Oral, Q6H PRN **OR** acetaminophen (TYLENOL) suppository 650 mg, 650 mg, Rectal, Q6H PRN, Edgard Patel MD    polyethylene glycol (MIRALAX) packet 17 g, 17 g, Oral, DAILY PRN, Edgard Patel MD    promethazine (PHENERGAN) tablet 12.5 mg, 12.5 mg, Oral, Q6H PRN **OR** ondansetron (ZOFRAN) injection 4 mg, 4 mg, IntraVENous, Q6H PRN, Moni Patel MD    oxyCODONE IR (ROXICODONE) tablet 10 mg, 10 mg, Oral, Q6H PRN, Moni Patel MD, 10 mg at 06/07/21 0548    bumetanide (BUMEX) injection 1 mg, 1 mg, IntraVENous, BID, Kari Spann MD, 1 mg at 06/07/21 7429

## 2021-06-07 NOTE — PROGRESS NOTES
CM attempted to meet with patient to complete DCP assessment, however patient reported that he was trying to rest and asked that CM come back at a later time. CM to follow-up.

## 2021-06-07 NOTE — PROGRESS NOTES
Infectious Disease Progress Note           Subjective:   Assessed pt at bedside. Doing well, denies new complaints. Reports right leg pain/discomfort   Objective:   Physical Exam:     Visit Vitals  /66   Pulse 86   Temp 97.4 °F (36.3 °C)   Resp 20   Ht 5' 11\" (1.803 m)   Wt (!) 380 lb 6.5 oz (172.6 kg)   SpO2 98%   BMI 53.06 kg/m²    O2 Flow Rate (L/min): 3 l/min O2 Device: Nasal cannula    Temp (24hrs), Av.5 °F (36.4 °C), Min:97.4 °F (36.3 °C), Max:97.6 °F (36.4 °C)    No intake/output data recorded.     1901 -  0700  In: -   Out: 3500 [Urine:3500]    General: NAD, alert, AAO x 4  HEENT: SURYA, Moist mucosa   Lungs: CTA b/l, decreased at the bases, no wheeze/rhonchi   Heart: S1S2+, RRR, no murmur  Abdo: Soft, NT, ND, +BS   Exts: resolving b/l leg edema, hyperpigmented skin changes   Skin: superficial ulcerations involving lateral surfaces of both legs     Data Review:       Recent Days:  Recent Labs     21  0659 21  1111 21  1502   WBC 6.6 6.7 7.4   HGB 11.7* 11.4* 11.0*   HCT 38.9 38.4 37.4    323 343     Recent Labs     21  0659 21  1111 21  1502   BUN 22* 26* 29*   CREA 1.19 1.34* 1.42*       No results found for: CRPQN, CRP, CRPM       Microbiology     Results       Procedure Component Value Units Date/Time    CULTURE, BLOOD #1 [[id number]] Collected: 21    Order Status: Completed Specimen: Blood Updated: 21     Special Requests: No Special Requests        Culture result: No growth 4 days       CULTURE, BLOOD #2 [[id number]] Collected: 21 1231    Order Status: Completed Specimen: Blood Updated: 21     Special Requests: No Special Requests        Culture result: No growth 4 days       CULTURE, BLOOD, PAIRED [[id number]] Collected: 21    Order Status: Completed Specimen: Blood Updated: 21     Special Requests: No Special Requests        Culture result: No growth 5 days Diagnostics   CXR Results  (Last 48 hours)      None             Assessment/Plan     1. Infected venous stasis ulcers involving b/l LEs L>R, chronic w superinfection       E faecalis, E. Cloacae and Alcaligenes faecalis isolated from wound Cx on 05/10      Afebrile, WBC WNLs. Wound Cx not done on current admit, abx per Cx results on 05/10      Pt agreeable w PICC line for out pt antibiotics if covered by his insurance, d/c antibiotic script on chart for coverage verification by CM         2. B/l leg cellulitis: improved b/l LE, non-compliant w leg elevation      3. B/l LE pain/discomfort: Continue symptomatic mgt        4. CHF w acute exacerbation on admission.  Cardiomegaly on CXR w/o evidence of focal infiltrate or edema       Being followed by cardiology       Rian Christine MD    6/7/2021

## 2021-06-07 NOTE — PROGRESS NOTES
PULMONARY NOTE  [facility]    Name: Jessi Wong MRN: [id number]   : 1955 Hospital: [address] [place] [address]   Date: 2021  Admission date: 2021 Hospital Day: 7       HPI:     Hospital Problems  Date Reviewed: 2021        Codes Class Noted POA    Cellulitis and abscess of right leg ICD-10-CM: L03.115, L02.415  ICD-9-CM: 682.6  2021 Unknown        Cellulitis ICD-10-CM: L03.90  ICD-9-CM: 682.9  2021 Unknown                   [x] High complexity decision making was performed  [x] See my orders for details      Subjective/Initial History:     I was asked by Demario Collado MD to see Jessi Wong  a 76 y.o.  male in consultation     Excerpts from admission 2021 or consult notes as follows:   27-year-old morbidly obese gentleman came in because of leg pain and swelling he has history of venous stasis leg edema diabetes mellitus, significant past medical history of COPD chronic hypoxic hypercapnic respiratory failure on trilogy noninvasive ventilator at home he was seen by me about a month ago he was doing fairly well he was compliant with his trilogy ventilator came in because of swelling of the lower extremities his BNP was elevated.        Allergies   Allergen Reactions    Elavil Unknown (comments)    Sulfa (Sulfonamide Antibiotics) Nausea and Vomiting        MAR reviewed and pertinent medications noted or modified as needed     Current Facility-Administered Medications   Medication    vitamin E acetate capsule 800 Units    metFORMIN (GLUCOPHAGE) tablet 1,000 mg    ammonium lactate (AMLACTIN) 12 % cream    insulin glargine (LANTUS) injection 32 Units    insulin glargine (LANTUS) injection 80 Units    nystatin (MYCOSTATIN) 100,000 unit/gram powder    Saccharomyces boulardii (FLORASTOR) capsule 250 mg    colchicine tablet 0.6 mg    piperacillin-tazobactam (ZOSYN) 3.375 g in 0.9% sodium chloride (MBP/ADV) 100 mL MBP    glucose chewable tablet 16 g    dextrose (D50W) injection syrg 12.5-25 g    glucagon (GLUCAGEN) injection 1 mg    albuterol (PROVENTIL HFA, VENTOLIN HFA, PROAIR HFA) inhaler 2 Puff    ascorbic acid (vitamin C) (VITAMIN C) tablet 1,000 mg    aspirin chewable tablet 81 mg    ferrous sulfate tablet 325 mg    fluticasone propionate (FLONASE) 50 mcg/actuation nasal spray 2 Spray    gabapentin (NEURONTIN) capsule 300 mg    multivitamin (ONE A DAY) tablet 1 Tablet    tamsulosin (FLOMAX) capsule 0.4 mg    tiotropium bromide (SPIRIVA RESPIMAT) 2.5 mcg /actuation    insulin lispro (HUMALOG) injection    acetaminophen (TYLENOL) tablet 650 mg    Or    acetaminophen (TYLENOL) suppository 650 mg    polyethylene glycol (MIRALAX) packet 17 g    promethazine (PHENERGAN) tablet 12.5 mg    Or    ondansetron (ZOFRAN) injection 4 mg    oxyCODONE IR (ROXICODONE) tablet 10 mg    bumetanide (BUMEX) injection 1 mg      Patient PCP: Iram Appiah MD  PMH:  has a past medical history of Arthritis, Chronic obstructive pulmonary disease ([facility] [address] Diabetes ([facility] [address] Gout, Hypertension, Ill-defined condition, and Sleep apnea. PSH:   has a past surgical history that includes hx orthopaedic and hx vein ablation adhesive. FHX: family history includes Diabetes in his brother, mother, and sister; Heart Disease in his father and mother; Hypertension in his father and mother; Kidney Disease in his mother. SHX:  reports that he has never smoked. He has never used smokeless tobacco. He reports current alcohol use. He reports that he does not use drugs. ROS:    Review of Systems   Constitutional: Positive for malaise/fatigue. HENT: Negative. Eyes: Negative. Respiratory: Positive for shortness of breath. Cardiovascular: Positive for orthopnea and claudication. Gastrointestinal: Negative. Genitourinary: Negative. Musculoskeletal:        Bilateral leg edema   Skin: Negative. Neurological: Negative. Psychiatric/Behavioral: Negative. Objective:     Vital Signs: Telemetry:    normal sinus rhythm Intake/Output:   Visit Vitals  /69 (BP 1 Location: Right arm, BP Patient Position: At rest)   Pulse 91   Temp 97.6 °F (36.4 °C)   Resp 20   Ht 5' 11\" (1.803 m)   Wt (!) 172.6 kg (380 lb 6.5 oz)   SpO2 98%   BMI 53.06 kg/m²       Temp (24hrs), Av.8 °F (36.6 °C), Min:97.6 °F (36.4 °C), Max:97.9 °F (36.6 °C)        O2 Device: Nasal cannula O2 Flow Rate (L/min): 3 l/min       Wt Readings from Last 4 Encounters:   21 (!) 172.6 kg (380 lb 6.5 oz)   21 136.1 kg (300 lb)   20 (!) 166.5 kg (367 lb)          Intake/Output Summary (Last 24 hours) at 2021 0929  Last data filed at 2021 0549  Gross per 24 hour   Intake --   Output 2900 ml   Net -2900 ml       Last shift:      No intake/output data recorded. Last 3 shifts:  1901 -  0700  In: -   Out: 3500 [Urine:3500]       Physical Exam:     Physical Exam  Constitutional:       Appearance: He is obese. HENT:      Head: Normocephalic and atraumatic. Nose: Nose normal.      Mouth/Throat:      Mouth: Mucous membranes are dry. Eyes:      Pupils: Pupils are equal, round, and reactive to light. Cardiovascular:      Rate and Rhythm: Normal rate and regular rhythm. Pulses: Normal pulses. Heart sounds: Normal heart sounds. Pulmonary:      Effort: Respiratory distress present. Breath sounds: Rales present. Abdominal:      General: Abdomen is flat. Bowel sounds are normal.      Palpations: Abdomen is soft. Musculoskeletal:         General: Swelling present. Cervical back: Normal range of motion and neck supple. Right lower leg: Edema present. Left lower leg: Edema present. Comments: Erythema tenderness on deep palpation  bilateral lower extremities   Neurological:      Mental Status: He is alert.           Labs:    Recent Labs     21  1111 21  1502   WBC 6.7 7.4   HGB 11.4* 11.0*    343     Recent Labs 06/07/21  0659 06/06/21  1111 06/05/21  1502    139 142   K 3.7 4.1 4.4    106 108   CO2 28 28 24   GLU 76 157* 108*   BUN 22* 26* 29*   CREA 1.19 1.34* 1.42*   CA 9.2 9.1 8.9   ALB 2.9* 3.0* 3.0*   ALT 38 42 43     No results for input(s): PH, PCO2, PO2, HCO3, FIO2 in the last 72 hours. No results for input(s): CPK, CKNDX, TROIQ in the last 72 hours. No lab exists for component: CPKMB  No results found for: BNPP, BNP   Lab Results   Component Value Date/Time    Culture result: No growth 4 days 06/02/2021 12:31 PM    Culture result: No growth 4 days 06/02/2021 12:31 PM    Culture result: No growth 5 days 06/01/2021 08:15 PM   No results found for: TSH, TSHEXT, TSHEXT    Imaging:    CXR Results  (Last 48 hours)    None        Results from Hospital Encounter encounter on 06/01/21    XR CHEST PORT    Narrative  Portable chest, 1949 hours. Comparison with 6/22/2020. Impression  Stable mild-to-moderate enlargement of cardiomediastinal silhouette. Mediastinum underpenetrated with suboptimal visualization of right atrial and  right ventricular pacing leads. There is obscuration of a portion of the left  hemithorax by the generator device. No pneumothorax. No evidence of pulmonary  edema, air space pneumonia, or pleural effusion. No results found for this or any previous visit. IMPRESSION:   1. Acute on chronic hypoxic hypercapnic respiratory failure  2. Chronic Obstructive Pulmonary Disease   3. Venous stasis ulcer with leg wound chronic leg edema possible cellulitis  4. CHF with diastolic dysfunction  5. Obstructive sleep apnea  6. Obesity gout hypertension type 2 diabetes  7. Acute kidney injury      RECOMMENDATIONS/PLAN:     1. Trilogy for non invasive ventilatory life support to prevent worsening respiratory acidosis  2. Patient is not actively wheezing no need for any systemic steroids  3. Continue with Bumex  4. Patient is on Zosyn  5.  Vascular surgeon on case for peripheral vascular edema and cellulitis       Kylah Dickerson MD

## 2021-06-07 NOTE — PROGRESS NOTES
Progress Note    Patient: Jessi Wong MRN: [id number]  SSN: [id number]    YOB: 1955  Age: 76 y.o. Sex: male      Admit Date: 6/1/2021    LOS: 5 days     Subjective:   No acute events overnight    Objective:     Vitals:    06/06/21 0821 06/06/21 0938 06/06/21 2027 06/06/21 2129   BP:  121/70  138/69   Pulse:  91     Resp:  20  20   Temp:  97.9 °F (36.6 °C)  97.6 °F (36.4 °C)   SpO2: 99% 99% 98% 98%   Weight:       Height:            Intake and Output:  Current Shift: No intake/output data recorded. Last three shifts: 06/05 1901 - 06/07 0700  In: -   Out: 3500 [Urine:3500]    Physical Exam:   General:  Alert, cooperative, no distress, appears stated age. Eyes:  Conjunctivae/corneas clear. PERRL, EOMs intact. Fundi benign   Ears:  Normal TMs and external ear canals both ears. Nose: Nares normal. Septum midline. Mucosa normal. No drainage or sinus tenderness. Mouth/Throat: Lips, mucosa, and tongue normal. Teeth and gums normal.   Neck: Supple, symmetrical, trachea midline, no adenopathy, thyroid: no enlargment/tenderness/nodules, no carotid bruit and no JVD. Back:   Symmetric, no curvature. ROM normal. No CVA tenderness. Lungs:   Clear to auscultation bilaterally. Heart:  Regular rate and rhythm, S1, S2 normal, no murmur, click, rub or gallop. Abdomen:   Soft, non-tender. Bowel sounds normal. No masses,  No organomegaly. Extremities: Extremities normal, atraumatic, no cyanosis or edema. Pulses: 2+ and symmetric all extremities. Skin: Skin color, texture, turgor normal. No rashes or lesions   Lymph nodes: Cervical, supraclavicular, and axillary nodes normal.   Neurologic: CNII-XII intact. Normal strength, sensation and reflexes throughout. Lab/Data Review: All lab results for the last 24 hours reviewed.          Assessment:     Active Problems:    Cellulitis (6/1/2021)      Cellulitis and abscess of right leg (6/2/2021)    Mr. Estuardo Vizcaino is a 59-year-old white male with:  1. Heart failure with decompensation. 2.  Morbid obesity. 3.  Hypertension with hypertensive heart disease. 4.  Mixed hyperlipidemia  5. Diabetes mellitus with neuropathy. 6.  Venous insufficiency, status post ablation( Dr. Blanca Dunlap)  7. Obstructive sleep apnea, on CPAP machine. 8.  COPD  9. Gout. 10. SSS s/p DCPM 9/2019  11. Off BB due to easy fatigability. Plan:     Patient appears to be pacing more often than I would like to see. We will check his pacemaker. We will continue IV diuresis. His creatinine is down to 1.1 and BUN of 22. Urine output of 3500 yesterday. Recheck labs in a.m.   Signed By: Deena Valladares MD     June 7, 2021

## 2021-06-07 NOTE — PROGRESS NOTES
Reason for Admission:  Cellulitis                     RUR Score:     14%                Plan for utilizing home health:    ? PCP: First and Last name:  Demar Trejo MD     Name of Practice:    Are you a current patient: Yes/No: Yes   Approximate date of last visit: Last month   Can you participate in a virtual visit with your PCP:                     Current Advanced Directive/Advance Care Plan: Full Code      Healthcare Decision Maker:   Constanza Neri (wife) [phone]  Click here to complete [employer] including selection of the Healthcare Decision Maker Relationship (ie \"Primary\")                             Transition of Care Plan:                      CM met with pt at the bedside to complete discharge assessment. Cm verified home address and emergency contact - Renee Found (wife). Pt has a walker and cane. Pt is not current with home health services. PT is recommending home health. Pt is requesting Cm follow up with home tomorrow for his decision on home health. CM will continue to follow.

## 2021-06-07 NOTE — PROGRESS NOTES
Cm attempted to meet with pt at the bedside this morning to complete discharge assessment, however wound care nurse was in the room.

## 2021-06-07 NOTE — WOUND CARE
IP WOUND CONSULT    [address] RECORD NUMBER:  [id number]  AGE: 76 y.o. GENDER: male  : 1955  TODAY'S DATE:  2021    GENERAL     [x] Follow-up   [] New Consult    Arnaud Johnson is a 76 y.o. male referred by:   [x] Physician  [] Nursing  [] Other:         PAST MEDICAL HISTORY    Past Medical History:   Diagnosis Date    Arthritis     Chronic obstructive pulmonary disease ([facility] [address]     Diabetes ([facility] [address]     Gout     Hypertension     Ill-defined condition     Sleep apnea         PAST SURGICAL HISTORY    Past Surgical History:   Procedure Laterality Date    HX ORTHOPAEDIC      HX VEIN ABLATION ADHESIVE         FAMILY HISTORY    Family History   Problem Relation Age of Onset    Heart Disease Mother     Kidney Disease Mother     Hypertension Mother     Diabetes Mother     Heart Disease Father     Hypertension Father     Diabetes Sister     Diabetes Brother          ALLERGIES    Allergies   Allergen Reactions    Elavil Unknown (comments)    Sulfa (Sulfonamide Antibiotics) Nausea and Vomiting       MEDICATIONS    No current facility-administered medications on file prior to encounter. Current Outpatient Medications on File Prior to Encounter   Medication Sig Dispense Refill    ascorbic acid, vitamin C, (Vitamin C) 500 mg tablet Take 1,000 mg by mouth two (2) times a day. Indications: inadequate vitamin C      tamsulosin (Flomax) 0.4 mg capsule Take 0.4 mg by mouth daily.  tiotropium (Spiriva with HandiHaler) 18 mcg inhalation capsule Take 1 Capsule by inhalation daily.  bumetanide (BUMEX) 2 mg tablet Take 2 mg by mouth two (2) times a day.  multivits,Stress Formula-Zinc tablet Take 1 Tab by mouth daily.  vitamin e (E GEMS) 1,000 unit capsule Take 1,000 Units by mouth two (2) times a day.  multivitamin (ONE A DAY) tablet Take 1 Tab by mouth daily.  aspirin 81 mg chewable tablet Take 81 mg by mouth daily.       flunisolide (NASAREL) 25 mcg (0.025 %) spry 2 Sprays two (2) times a day.  albuterol (ProAir HFA) 90 mcg/actuation inhaler Take  by inhalation.  colchicine 0.6 mg tablet Take 0.6 mg by mouth daily.  gabapentin (NEURONTIN) 300 mg capsule Take 300 mg by mouth four (4) times daily.  oxyCODONE IR (ROXICODONE) 5 mg immediate release tablet Take 5 mg by mouth every twelve (12) hours.  metFORMIN (GLUCOPHAGE) 500 mg tablet Take 1,000 mg by mouth two (2) times daily (with meals).  insulin lispro (HumaLOG U-100 Insulin) 100 unit/mL injection by SubCUTAneous route. Sliding scale      ferrous sulfate (Iron) 325 mg (65 mg iron) tablet Take  by mouth Daily (before breakfast).  hydroCHLOROthiazide (HYDRODIURIL) 25 mg tablet Take 25 mg by mouth daily.  insulin glargine (LANTUS) 100 unit/mL injection by SubCUTAneous route nightly. 80units am/ 20units pm (Patient not taking: Reported on 6/2/2021)           [unfilled]  Visit Vitals  /69 (BP 1 Location: Right arm, BP Patient Position: At rest)   Pulse 91   Temp 97.6 °F (36.4 °C)   Resp 20   Ht 5' 11\" (1.803 m)   Wt (!) 172.6 kg (380 lb 6.5 oz)   SpO2 98%   BMI 53.06 kg/m²       ASSESSMENT     Wound Identification & Type: Venous stasis ulcers to BLEs  Dressing change: Yes, see flowchart  Verbal consent for picture: Yes    Contributing Factors: anticoagulation therapy, edema, venous stasis, lymphedema, diabetes, poor glucose control, decreased mobility, shear force, obesity and poor hygiene    Wound Leg lower Left;Lateral #1 (Active)   Wound Image   06/07/21 1208   Wound Etiology Venous 06/07/21 1208   Dressing Status New dressing applied 06/07/21 1208   Cleansed Cleansed with saline 06/07/21 1208   Dressing/Treatment ABD pad; Ace wrap;Roll gauze; Xeroform 06/07/21 1208   Offloading for Diabetic Foot Ulcers No 06/01/21 1405   Dressing Change Due 06/08/21 06/07/21 1208   Wound Length (cm) 5 cm 06/07/21 1208   Wound Width (cm) 2.8 cm 06/07/21 1208   Wound Depth (cm) 0.2 cm 06/07/21 1208   Wound Surface Area (cm^2) 14 cm^2 06/07/21 1208   Change in Wound Size % 45.36 06/07/21 1208   Wound Volume (cm^3) 2.8 cm^3 06/07/21 1208   Wound Healing % 45 06/07/21 1208   Wound Assessment Pink/red;Subcutaneous 06/07/21 1208   Drainage Amount Small 06/07/21 1208   Drainage Description Serous 06/07/21 1208   Wound Odor None 06/06/21 1930   Alissa-Wound/Incision Assessment Edematous;Fragile; Maceration; Other (Comment) 06/07/21 1208   Edges Undefined edges 06/07/21 1208   Wound Thickness Description Full thickness 06/07/21 1208   Number of days: 266       Wound Leg lower Right;Lateral #2 (Active)   Wound Image   06/07/21 1211   Wound Etiology Venous 06/07/21 1211   Dressing Status New dressing applied 06/07/21 1211   Cleansed Cleansed with saline 06/07/21 1211   Dressing/Treatment ABD pad; Ace wrap;Xeroform;Roll gauze 06/07/21 1211   Offloading for Diabetic Foot Ulcers No offloading required 06/07/21 0333   Dressing Change Due 06/08/21 06/07/21 1211   Wound Length (cm) 10.8 cm 06/07/21 1211   Wound Width (cm) 5.4 cm 06/07/21 1211   Wound Depth (cm) 0.2 cm 06/07/21 1211   Wound Surface Area (cm^2) 58.32 cm^2 06/07/21 1211   Change in Wound Size % -62 06/07/21 1211   Wound Volume (cm^3) 11.66 cm^3 06/07/21 1211   Wound Healing % -224 06/07/21 1211   Wound Assessment Pink/red;Pale granulation tissue 06/07/21 1211   Drainage Amount Small 06/07/21 1211   Drainage Description Serous 06/07/21 1211   Wound Odor None 06/07/21 1211   Alissa-Wound/Incision Assessment Dry/flaky;Edematous;Fragile 06/07/21 1211   Edges Undefined edges 06/07/21 1211   Wound Thickness Description Full thickness 06/07/21 1211   Number of days: 13          PLAN     Skin Care & Pressure Relief Recommendations  Minimize layers of linen  Turn/reposition approximately every 2 hours  Pillow wedges  Offload heels pillows    Kumar Saunders  Blood Glucose: 122 on 6/7/21                             Albumin: 2.9 on 6/7/21  WBCs: 6.6 on 6/7/21    Support Surface: Gel mattress    Physician/Provider notified: Dr. Juan Ryder   Recommendations: Venous stasis ulcers to BLEs are improving. Edema still noted to BLEs. Reviewed with patient that both legs need to remain elevated to help reduce edema/swelling. He verbalized understanding. Changed dressing according to order and applied ACE wraps for compression. Will apply Profore wraps as soon as available. Will continue to follow.       Teaching completed with:   [] Patient           [] Family member       [] Caregiver          [] Nursing  [] Other    Patient/Caregiver Teaching:  Level of patient/caregiver understanding able to:   [] Indicates understanding       [] Needs reinforcement  [] Unsuccessful      [] Verbal Understanding  [] Demonstrated understanding       [] No evidence of learning  [] Refused teaching         [] N/A       Electronically signed by Teddy Wilder RN on 6/7/2021 at 12:35 PM

## 2021-06-07 NOTE — PROGRESS NOTES
Spiritual Care Assessment/Progress Note  [address] [facility]      NAME: Kemar Conner      MRN: [id number]  AGE: 76 y.o.  SEX: male  Roman Catholic Affiliation: No preference   Language: English     6/7/2021     Total Time (in minutes): 10     Spiritual Assessment begun in [facility] through conversation with:         [x]Patient        [] Family    [] Friend(s)        Reason for Consult: Initial/Spiritual assessment, patient floor     Spiritual beliefs: (Please include comment if needed)     [x] Identifies with a case tradition:         [] Supported by a case community:            [] Claims no spiritual orientation:           [] Seeking spiritual identity:                [] Adheres to an individual form of spirituality:           [] Not able to assess:                           Identified resources for coping:      [x] Prayer                               [] Music                  [] Guided Imagery     [] Family/friends                 [] Pet visits     [] Devotional reading                         [] Unknown     [] Other:                                               Interventions offered during this visit: (See comments for more details)    Patient Interventions: Affirmation of emotions/emotional suffering, Affirmation of case, Bridging, Catharsis/review of pertinent events in supportive environment, Coping skills reviewed/reinforced, Iconic (affirming the presence of God/Higher Power), Initial/Spiritual assessment, patient floor, Normalization of emotional/spiritual concerns, Prayer (actual), Prayer (assurance of)           Plan of Care:     [] Support spiritual and/or cultural needs    [] Support AMD and/or advance care planning process      [] Support grieving process   [] Coordinate Rites and/or Rituals    [] Coordination with community clergy   [] No spiritual needs identified at this time   [] Detailed Plan of Care below (See Comments)  [] Make referral to Music Therapy  [] Make referral to Pet Therapy     [] Make referral to Addiction services  [] Make referral to [facility]  [] Make referral to Spiritual Care Partner  [] No future visits requested        [x] Follow up upon further referrals     Comments:  I [facility] [address] for initial assessment. Patient was alone during the visit. Patient engaged in life review and stated he is doing better and that his heart goes out for all the patients in the hospital at this time. He expressed his case in God and said he prays for the patients and staff in the hospital.  I provided chaplaincy education and listened with empathy and reflection while facilitating storytelling and exploring his needs. I affirmed his case in God and concern for other patients in the hospital.  I provided prayer per his request and advised of  availability. Chaplains will follow up if further referrals are requested. Chaplain Libertad Becker M.Div.    can be reached by calling the  at [facility]  [phone]

## 2021-06-08 LAB
ALBUMIN SERPL-MCNC: 2.9 G/DL (ref 3.5–5)
ALBUMIN/GLOB SERPL: 0.6 {RATIO} (ref 1.1–2.2)
ALP SERPL-CCNC: 71 U/L (ref 45–117)
ALT SERPL-CCNC: 38 U/L (ref 12–78)
ANION GAP SERPL CALC-SCNC: 9 MMOL/L (ref 5–15)
AST SERPL W P-5'-P-CCNC: 26 U/L (ref 15–37)
BACTERIA SPEC CULT: NORMAL
BASOPHILS # BLD: 0 K/UL (ref 0–0.1)
BASOPHILS NFR BLD: 0 % (ref 0–1)
BILIRUB SERPL-MCNC: 0.3 MG/DL (ref 0.2–1)
BUN SERPL-MCNC: 20 MG/DL (ref 6–20)
BUN/CREAT SERPL: 16 (ref 12–20)
CA-I BLD-MCNC: 9.2 MG/DL (ref 8.5–10.1)
CHLORIDE SERPL-SCNC: 107 MMOL/L (ref 97–108)
CO2 SERPL-SCNC: 27 MMOL/L (ref 21–32)
CREAT SERPL-MCNC: 1.26 MG/DL (ref 0.7–1.3)
DIFFERENTIAL METHOD BLD: ABNORMAL
EOSINOPHIL # BLD: 0.3 K/UL (ref 0–0.4)
EOSINOPHIL NFR BLD: 4 % (ref 0–7)
ERYTHROCYTE [DISTWIDTH] IN BLOOD BY AUTOMATED COUNT: 15 % (ref 11.5–14.5)
GLOBULIN SER CALC-MCNC: 5 G/DL (ref 2–4)
GLUCOSE BLD STRIP.AUTO-MCNC: 104 MG/DL (ref 65–117)
GLUCOSE BLD STRIP.AUTO-MCNC: 114 MG/DL (ref 65–117)
GLUCOSE BLD STRIP.AUTO-MCNC: 186 MG/DL (ref 65–117)
GLUCOSE BLD STRIP.AUTO-MCNC: 75 MG/DL (ref 65–117)
GLUCOSE SERPL-MCNC: 68 MG/DL (ref 65–100)
HCT VFR BLD AUTO: 39.8 % (ref 36.6–50.3)
HGB BLD-MCNC: 11.8 G/DL (ref 12.1–17)
IMM GRANULOCYTES # BLD AUTO: 0 K/UL (ref 0–0.04)
IMM GRANULOCYTES NFR BLD AUTO: 0 % (ref 0–0.5)
LYMPHOCYTES # BLD: 1.8 K/UL (ref 0.8–3.5)
LYMPHOCYTES NFR BLD: 28 % (ref 12–49)
MCH RBC QN AUTO: 27.4 PG (ref 26–34)
MCHC RBC AUTO-ENTMCNC: 29.6 G/DL (ref 30–36.5)
MCV RBC AUTO: 92.3 FL (ref 80–99)
MONOCYTES # BLD: 0.6 K/UL (ref 0–1)
MONOCYTES NFR BLD: 9 % (ref 5–13)
NEUTS SEG # BLD: 3.8 K/UL (ref 1.8–8)
NEUTS SEG NFR BLD: 59 % (ref 32–75)
NRBC # BLD: 0 K/UL (ref 0–0.01)
NRBC BLD-RTO: 0 PER 100 WBC
PERFORMED BY, TECHID: ABNORMAL
PERFORMED BY, TECHID: NORMAL
PLATELET # BLD AUTO: 357 K/UL (ref 150–400)
PMV BLD AUTO: 10.9 FL (ref 8.9–12.9)
POTASSIUM SERPL-SCNC: 3.7 MMOL/L (ref 3.5–5.1)
PROT SERPL-MCNC: 7.9 G/DL (ref 6.4–8.2)
RBC # BLD AUTO: 4.31 M/UL (ref 4.1–5.7)
SODIUM SERPL-SCNC: 143 MMOL/L (ref 136–145)
SPECIAL REQUESTS,SREQ: NORMAL
WBC # BLD AUTO: 6.6 K/UL (ref 4.1–11.1)

## 2021-06-08 PROCEDURE — 74011250637 HC RX REV CODE- 250/637

## 2021-06-08 PROCEDURE — 74011000258 HC RX REV CODE- 258: Performed by: FAMILY MEDICINE

## 2021-06-08 PROCEDURE — 74011250637 HC RX REV CODE- 250/637: Performed by: SURGERY

## 2021-06-08 PROCEDURE — 74011636637 HC RX REV CODE- 636/637: Performed by: INTERNAL MEDICINE

## 2021-06-08 PROCEDURE — 36415 COLL VENOUS BLD VENIPUNCTURE: CPT

## 2021-06-08 PROCEDURE — 94640 AIRWAY INHALATION TREATMENT: CPT

## 2021-06-08 PROCEDURE — 74011250636 HC RX REV CODE- 250/636: Performed by: FAMILY MEDICINE

## 2021-06-08 PROCEDURE — 74011000250 HC RX REV CODE- 250: Performed by: INTERNAL MEDICINE

## 2021-06-08 PROCEDURE — 99232 SBSQ HOSP IP/OBS MODERATE 35: CPT | Performed by: INTERNAL MEDICINE

## 2021-06-08 PROCEDURE — 82962 GLUCOSE BLOOD TEST: CPT

## 2021-06-08 PROCEDURE — 85025 COMPLETE CBC W/AUTO DIFF WBC: CPT

## 2021-06-08 PROCEDURE — 77010033678 HC OXYGEN DAILY

## 2021-06-08 PROCEDURE — 74011250637 HC RX REV CODE- 250/637: Performed by: FAMILY MEDICINE

## 2021-06-08 PROCEDURE — 65270000029 HC RM PRIVATE

## 2021-06-08 PROCEDURE — 94760 N-INVAS EAR/PLS OXIMETRY 1: CPT

## 2021-06-08 PROCEDURE — 80053 COMPREHEN METABOLIC PANEL: CPT

## 2021-06-08 RX ORDER — AMMONIUM LACTATE 12 G/100G
CREAM TOPICAL AS NEEDED
Qty: 280 G | Refills: 0 | Status: SHIPPED | OUTPATIENT
Start: 2021-06-08

## 2021-06-08 RX ORDER — LANOLIN ALCOHOL/MO/W.PET/CERES
800 CREAM (GRAM) TOPICAL 2 TIMES DAILY
Qty: 100 CAPSULE | Refills: 0 | Status: SHIPPED | OUTPATIENT
Start: 2021-06-08

## 2021-06-08 RX ORDER — SAME BUTANEDISULFONATE/BETAINE 400-600 MG
250 POWDER IN PACKET (EA) ORAL 2 TIMES DAILY
Qty: 14 CAPSULE | Refills: 0 | Status: SHIPPED | OUTPATIENT
Start: 2021-06-08 | End: 2021-06-15

## 2021-06-08 RX ADMIN — OXYCODONE HYDROCHLORIDE 10 MG: 10 TABLET ORAL at 21:06

## 2021-06-08 RX ADMIN — NYSTATIN: 100000 CREAM TOPICAL at 09:00

## 2021-06-08 RX ADMIN — OXYCODONE HYDROCHLORIDE AND ACETAMINOPHEN 1000 MG: 500 TABLET ORAL at 21:06

## 2021-06-08 RX ADMIN — FLUTICASONE PROPIONATE 2 SPRAY: 50 SPRAY, METERED NASAL at 09:00

## 2021-06-08 RX ADMIN — OXYCODONE HYDROCHLORIDE 10 MG: 10 TABLET ORAL at 06:26

## 2021-06-08 RX ADMIN — OXYCODONE HYDROCHLORIDE AND ACETAMINOPHEN 1000 MG: 500 TABLET ORAL at 08:58

## 2021-06-08 RX ADMIN — TAMSULOSIN HYDROCHLORIDE 0.4 MG: 0.4 CAPSULE ORAL at 08:58

## 2021-06-08 RX ADMIN — Medication 800 UNITS: at 21:06

## 2021-06-08 RX ADMIN — METFORMIN HYDROCHLORIDE 1000 MG: 500 TABLET ORAL at 08:58

## 2021-06-08 RX ADMIN — GABAPENTIN 300 MG: 300 CAPSULE ORAL at 13:53

## 2021-06-08 RX ADMIN — NYSTATIN: 100000 CREAM TOPICAL at 21:12

## 2021-06-08 RX ADMIN — COLCHICINE 0.6 MG: 0.6 TABLET, FILM COATED ORAL at 08:59

## 2021-06-08 RX ADMIN — GABAPENTIN 300 MG: 300 CAPSULE ORAL at 18:16

## 2021-06-08 RX ADMIN — OXYCODONE HYDROCHLORIDE 10 MG: 10 TABLET ORAL at 13:54

## 2021-06-08 RX ADMIN — PIPERACILLIN AND TAZOBACTAM 3.38 G: 3; .375 INJECTION, POWDER, LYOPHILIZED, FOR SOLUTION INTRAVENOUS at 13:54

## 2021-06-08 RX ADMIN — MULTIVITAMIN TABLET 1 TABLET: TABLET at 08:58

## 2021-06-08 RX ADMIN — INSULIN GLARGINE 80 UNITS: 100 INJECTION, SOLUTION SUBCUTANEOUS at 08:59

## 2021-06-08 RX ADMIN — Medication 250 MG: at 08:59

## 2021-06-08 RX ADMIN — BUMETANIDE 1 MG: 0.25 INJECTION INTRAMUSCULAR; INTRAVENOUS at 21:05

## 2021-06-08 RX ADMIN — FERROUS SULFATE TAB 325 MG (65 MG ELEMENTAL FE) 325 MG: 325 (65 FE) TAB at 08:58

## 2021-06-08 RX ADMIN — GABAPENTIN 300 MG: 300 CAPSULE ORAL at 08:58

## 2021-06-08 RX ADMIN — PIPERACILLIN AND TAZOBACTAM 3.38 G: 3; .375 INJECTION, POWDER, LYOPHILIZED, FOR SOLUTION INTRAVENOUS at 21:07

## 2021-06-08 RX ADMIN — METFORMIN HYDROCHLORIDE 1000 MG: 500 TABLET ORAL at 18:16

## 2021-06-08 RX ADMIN — BUMETANIDE 1 MG: 0.25 INJECTION INTRAMUSCULAR; INTRAVENOUS at 09:00

## 2021-06-08 RX ADMIN — INSULIN GLARGINE 32 UNITS: 100 INJECTION, SOLUTION SUBCUTANEOUS at 21:05

## 2021-06-08 RX ADMIN — ASPIRIN 81 MG: 81 TABLET, CHEWABLE ORAL at 08:59

## 2021-06-08 RX ADMIN — Medication 250 MG: at 21:06

## 2021-06-08 RX ADMIN — PIPERACILLIN AND TAZOBACTAM 3.38 G: 3; .375 INJECTION, POWDER, LYOPHILIZED, FOR SOLUTION INTRAVENOUS at 06:26

## 2021-06-08 RX ADMIN — GABAPENTIN 300 MG: 300 CAPSULE ORAL at 21:06

## 2021-06-08 RX ADMIN — OXYCODONE HYDROCHLORIDE 10 MG: 10 TABLET ORAL at 00:44

## 2021-06-08 RX ADMIN — Medication 800 UNITS: at 08:59

## 2021-06-08 NOTE — PROGRESS NOTES
General Daily Progress Note          Patient Name:   Migue Mendoza       YOB: 1955       Age:  76 y.o. Admit Date: 6/1/2021      Subjective:     Patient is N 58 y.o. year old male with a history of COPD, diabetes, gout, hypertension, arthritis, and sleep apnea. Patient has venous ulcers on his lower legs bilaterally that were being treated by wound care for several months with no improvement. Wound care recommended admission to the hospital for multidisplinary management of his venous ulcers. He was seen in the ER 6/1 and admitted. Patient is alert, awake, and in no acute distress. He still has leg pain, but it has improved. He denies chest pain, SOB, N/V, and abdominal pain.                   Objective:     Visit Vitals  /70 (BP 1 Location: Right lower arm, BP Patient Position: At rest)   Pulse 83   Temp 97.7 °F (36.5 °C)   Resp 18   Ht 5' 11\" (1.803 m)   Wt (!) 171.3 kg (377 lb 11.2 oz)   SpO2 96%   BMI 52.68 kg/m²        Recent Results (from the past 24 hour(s))   GLUCOSE, POC    Collection Time: 06/07/21 11:43 AM   Result Value Ref Range    Glucose (POC) 122 (H) 65 - 117 mg/dL    Performed by CS Disco    GLUCOSE, POC    Collection Time: 06/07/21  5:10 PM   Result Value Ref Range    Glucose (POC) 124 (H) 65 - 117 mg/dL    Performed by Tiburcio Bass    GLUCOSE, POC    Collection Time: 06/07/21  7:55 PM   Result Value Ref Range    Glucose (POC) 140 (H) 65 - 117 mg/dL    Performed by Renee LOMBARDI    CBC WITH AUTOMATED DIFF    Collection Time: 06/08/21  6:21 AM   Result Value Ref Range    WBC 6.6 4.1 - 11.1 K/uL    RBC 4.31 4.10 - 5.70 M/uL    HGB 11.8 (L) 12.1 - 17.0 g/dL    HCT 39.8 36.6 - 50.3 %    MCV 92.3 80.0 - 99.0 FL    MCH 27.4 26.0 - 34.0 PG    MCHC 29.6 (L) 30.0 - 36.5 g/dL    RDW 15.0 (H) 11.5 - 14.5 %    PLATELET 120 708 - 817 K/uL    MPV 10.9 8.9 - 12.9 FL    NRBC 0.0 0.0  WBC    ABSOLUTE NRBC 0.00 0.00 - 0.01 K/uL    NEUTROPHILS 59 32 - 75 % LYMPHOCYTES 28 12 - 49 %    MONOCYTES 9 5 - 13 %    EOSINOPHILS 4 0 - 7 %    BASOPHILS 0 0 - 1 %    IMMATURE GRANULOCYTES 0 0 - 0.5 %    ABS. NEUTROPHILS 3.8 1.8 - 8.0 K/UL    ABS. LYMPHOCYTES 1.8 0.8 - 3.5 K/UL    ABS. MONOCYTES 0.6 0.0 - 1.0 K/UL    ABS. EOSINOPHILS 0.3 0.0 - 0.4 K/UL    ABS. BASOPHILS 0.0 0.0 - 0.1 K/UL    ABS. IMM. GRANS. 0.0 0.00 - 0.04 K/UL    DF AUTOMATED     GLUCOSE, POC    Collection Time: 06/08/21  7:35 AM   Result Value Ref Range    Glucose (POC) 75 65 - 117 mg/dL    Performed by Meredith Carmona      [unfilled]      Review of Systems    Constitutional: Negative for chills and fever. HENT: Negative. Eyes: Negative. Respiratory: Negative. Cardiovascular: Negative. Gastrointestinal: Negative for abdominal pain and nausea. Skin: Negative. Neurological: Negative. Physical Exam:      Constitutional: pt is oriented to person, place, and time. HENT:   Head: Normocephalic and atraumatic. Eyes: Pupils are equal, round, and reactive to light. EOM are normal.   Cardiovascular: Normal rate, regular rhythm and normal heart sounds. Pulmonary/Chest: Breath sounds normal. No wheezes. No rales. Exhibits no tenderness. Abdominal: Soft. Bowel sounds are normal. There is no abdominal tenderness. There is no rebound and no guarding. Musculoskeletal: Normal range of motion. Neurological: pt is alert and oriented to person, place, and time. Extremities: Venous ulcers R and L lateral lower legs with surrounding discoloration of lower extremities, currently covered with wound dressings    XR CHEST PORT   Final Result   Stable mild-to-moderate enlargement of cardiomediastinal silhouette. Mediastinum underpenetrated with suboptimal visualization of right atrial and   right ventricular pacing leads. There is obscuration of a portion of the left   hemithorax by the generator device. No pneumothorax.  No evidence of pulmonary   edema, air space pneumonia, or pleural effusion. Recent Results (from the past 24 hour(s))   GLUCOSE, POC    Collection Time: 06/07/21 11:43 AM   Result Value Ref Range    Glucose (POC) 122 (H) 65 - 117 mg/dL    Performed by Rocío Flores    GLUCOSE, POC    Collection Time: 06/07/21  5:10 PM   Result Value Ref Range    Glucose (POC) 124 (H) 65 - 117 mg/dL    Performed by Ayo Álvarez    GLUCOSE, POC    Collection Time: 06/07/21  7:55 PM   Result Value Ref Range    Glucose (POC) 140 (H) 65 - 117 mg/dL    Performed by Jonathan Marvin    CBC WITH AUTOMATED DIFF    Collection Time: 06/08/21  6:21 AM   Result Value Ref Range    WBC 6.6 4.1 - 11.1 K/uL    RBC 4.31 4.10 - 5.70 M/uL    HGB 11.8 (L) 12.1 - 17.0 g/dL    HCT 39.8 36.6 - 50.3 %    MCV 92.3 80.0 - 99.0 FL    MCH 27.4 26.0 - 34.0 PG    MCHC 29.6 (L) 30.0 - 36.5 g/dL    RDW 15.0 (H) 11.5 - 14.5 %    PLATELET 726 016 - 688 K/uL    MPV 10.9 8.9 - 12.9 FL    NRBC 0.0 0.0  WBC    ABSOLUTE NRBC 0.00 0.00 - 0.01 K/uL    NEUTROPHILS 59 32 - 75 %    LYMPHOCYTES 28 12 - 49 %    MONOCYTES 9 5 - 13 %    EOSINOPHILS 4 0 - 7 %    BASOPHILS 0 0 - 1 %    IMMATURE GRANULOCYTES 0 0 - 0.5 %    ABS. NEUTROPHILS 3.8 1.8 - 8.0 K/UL    ABS. LYMPHOCYTES 1.8 0.8 - 3.5 K/UL    ABS. MONOCYTES 0.6 0.0 - 1.0 K/UL    ABS. EOSINOPHILS 0.3 0.0 - 0.4 K/UL    ABS. BASOPHILS 0.0 0.0 - 0.1 K/UL    ABS. IMM.  GRANS. 0.0 0.00 - 0.04 K/UL    DF AUTOMATED     GLUCOSE, POC    Collection Time: 06/08/21  7:35 AM   Result Value Ref Range    Glucose (POC) 75 65 - 117 mg/dL    Performed by Vazquez Tony        Results     Procedure Component Value Units Date/Time    CULTURE, BLOOD #1 [[id number]] Collected: 06/02/21 1231    Order Status: Completed Specimen: Blood Updated: 06/08/21 0812     Special Requests: No Special Requests        Culture result: No growth 5 days       CULTURE, BLOOD #2 [[id number]] Collected: 06/02/21 1231    Order Status: Completed Specimen: Blood Updated: 06/08/21 9780     Special Requests: No Special Requests        Culture result: No growth 5 days       CULTURE, BLOOD, PAIRED [[id number]] Collected: 06/01/21 2015    Order Status: Completed Specimen: Blood Updated: 06/08/21 0812     Special Requests: No Special Requests        Culture result: No growth 6 days              Labs:     Recent Labs     06/08/21  0621 06/07/21  0659   WBC 6.6 6.6   HGB 11.8* 11.7*   HCT 39.8 38.9    300     Recent Labs     06/07/21  0659 06/06/21  1111 06/05/21  1502    139 142   K 3.7 4.1 4.4    106 108   CO2 28 28 24   BUN 22* 26* 29*   CREA 1.19 1.34* 1.42*   GLU 76 157* 108*   CA 9.2 9.1 8.9     Recent Labs     06/07/21  0659 06/06/21  1111 06/05/21  1502   ALT 38 42 43   AP 67 73 74   TBILI 0.3 0.4 0.4   TP 7.8 8.0 7.8   ALB 2.9* 3.0* 3.0*   GLOB 4.9* 5.0* 4.8*     No results for input(s): INR, PTP, APTT, INREXT in the last 72 hours. No results for input(s): FE, TIBC, PSAT, FERR in the last 72 hours. No results found for: FOL, RBCF   No results for input(s): PH, PCO2, PO2 in the last 72 hours. No results for input(s): CPK, CKNDX, TROIQ in the last 72 hours.     No lab exists for component: CPKMB  No results found for: CHOL, CHOLX, CHLST, CHOLV, HDL, HDLP, LDL, LDLC, DLDLP, TGLX, TRIGL, TRIGP, CHHD, CHHDX  Lab Results   Component Value Date/Time    Glucose (POC) 75 06/08/2021 07:35 AM    Glucose (POC) 140 (H) 06/07/2021 07:55 PM    Glucose (POC) 124 (H) 06/07/2021 05:10 PM    Glucose (POC) 122 (H) 06/07/2021 11:43 AM    Glucose (POC) 128 (H) 06/06/2021 09:21 PM     No results found for: COLOR, APPRN, SPGRU, REFSG, KENTON, PROTU, GLUCU, KETU, BILU, UROU, DELMY, LEUKU, GLUKE, EPSU, BACTU, WBCU, RBCU, CASTS, UCRY      Assessment:     Acute cellulitis of both legs  Venous stasis ulcer  Chronic hypoxemic respiratory failure  Acute on chronic kidney disease  History of CHF diastolic dysfunction  COPD  Type 2 diabetes  Hypertension  Gout  Arthritis   Sleep Apnea   Morbid obesity  Superficial Thrombosis of left greater saphenous vein   Ejection fraction about 40 to 45%      Plan:   Check echo. Continue IV diuresis. Stop HCTZ and switch to Bumex IV 1 mg twice a day  Continue aspirin  Monitor kidney function  Stop colchicine due to abnormal kidney function. Daily weights and ins and outs      Seen by the cardiology he recommends interrogation for pacemaker.        Per ID:   Patient agreeable to PICC line for outpatient abx coverage, pending insurance coverage.    D/c antibiotic script on chart for coverage verification by .     Continue Trilogy.         In by the vascular surgeon's recommendations are        Patient was advised strongly that he needs to have both legs elevated in order for these 2 wounds to heal.  Once the swelling is a relatively under control and the secondary edema and cellulitis resolved we were planning on Profore dressing both lower extremity.  I have instructed nursing staff to keep both legs elevated 3 pillows at least if is tolerable.     Awaiting interrogation for pacemaker and Profore dressings to become available before discharge.      PT OT consult-discharge to [facility]           Current Facility-Administered Medications:     nystatin (MYCOSTATIN) 100,000 unit/gram cream, , Topical, BID, Sean Patel MD, Given at 06/08/21 0900    vitamin E acetate capsule 800 Units, 800 Units, Oral, BID, Moni Patel MD, 800 Units at 06/08/21 0859    metFORMIN (GLUCOPHAGE) tablet 1,000 mg, 1,000 mg, Oral, BID WITH MEALS, Moni Patel MD, 1,000 mg at 06/08/21 0858    ammonium lactate (AMLACTIN) 12 % cream, , Topical, PRN, Sean Patel MD, Given at 06/06/21 0951    insulin glargine (LANTUS) injection 32 Units, 32 Units, SubCUTAneous, QHS, Darren Maurer MD, 32 Units at 06/07/21 2031    insulin glargine (LANTUS) injection 80 Units, 80 Units, SubCUTAneous, DAILY, Darren Maurer MD, 80 Units at 06/08/21 0859    Saccharomyces boulardii (FLORASTOR) capsule 250 mg, 250 mg, Oral, BID, Kvng Rodrigez MD, 250 mg at 06/08/21 0859    colchicine tablet 0.6 mg, 0.6 mg, Oral, DAILY, Samson, Franklin Wilburn MD, 0.6 mg at 06/08/21 0859    piperacillin-tazobactam (ZOSYN) 3.375 g in 0.9% sodium chloride (MBP/ADV) 100 mL MBP, 3.375 g, IntraVENous, Q8H, Moni Patel MD, Last Rate: 25 mL/hr at 06/08/21 0626, 3.375 g at 06/08/21 0334    glucose chewable tablet 16 g, 4 Tablet, Oral, PRN, Moni Patel MD    dextrose (D50W) injection syrg 12.5-25 g, 25-50 mL, IntraVENous, PRN, Ap Patel MD    glucagon (GLUCAGEN) injection 1 mg, 1 mg, IntraMUSCular, PRN, Ap Patel MD    albuterol (PROVENTIL HFA, VENTOLIN HFA, PROAIR HFA) inhaler 2 Puff, 2 Puff, Inhalation, Q6H PRN, Kvng Rodrigez MD    ascorbic acid (vitamin C) (VITAMIN C) tablet 1,000 mg, 1,000 mg, Oral, BID, Kvng Rodrigez MD, 1,000 mg at 06/08/21 8364    aspirin chewable tablet 81 mg, 81 mg, Oral, DAILY, Kvng Rodrigez MD, 81 mg at 06/08/21 4168    ferrous sulfate tablet 325 mg, 1 Tablet, Oral, ACB, Moni Patel MD, 325 mg at 06/08/21 0858    fluticasone propionate (FLONASE) 50 mcg/actuation nasal spray 2 Spray, 2 Spray, Both Nostrils, DAILY, Moni Patel MD, 2 [place] at 06/08/21 0900    gabapentin (NEURONTIN) capsule 300 mg, 300 mg, Oral, QID, Kvng Rodrigez MD, 300 mg at 06/08/21 4702    multivitamin (ONE A DAY) tablet 1 Tablet, 1 Tablet, Oral, DAILY, Moni Patel MD, 1 Tablet at 06/08/21 0858    tamsulosin (FLOMAX) capsule 0.4 mg, 0.4 mg, Oral, DAILY, Moni Patel MD, 0.4 mg at 06/08/21 0858    tiotropium bromide (SPIRIVA RESPIMAT) 2.5 mcg /actuation, 5 mcg, Inhalation, DAILY, Moni Ptael MD, 2 Puff at 06/08/21 0912    insulin lispro (HUMALOG) injection, , SubCUTAneous, AC&HS, Moni Patel MD, 3 Units at 06/06/21 1324    acetaminophen (TYLENOL) tablet 650 mg, 650 mg, Oral, Q6H PRN **OR** acetaminophen (TYLENOL) suppository 650 mg, 650 mg, Rectal, Q6H PRN, Ap Patel, MD    polyethylene glycol (MIRALAX) packet 17 g, 17 g, Oral, DAILY PRN, Michael Patel MD    promethazine (PHENERGAN) tablet 12.5 mg, 12.5 mg, Oral, Q6H PRN **OR** ondansetron (ZOFRAN) injection 4 mg, 4 mg, IntraVENous, Q6H PRN, Moni Patel MD    oxyCODONE IR (ROXICODONE) tablet 10 mg, 10 mg, Oral, Q6H PRN, Moni Patel MD, 10 mg at 06/08/21 0626    bumetanide (BUMEX) injection 1 mg, 1 mg, IntraVENous, BID, Kari Spann MD, 1 mg at 06/08/21 0900

## 2021-06-08 NOTE — PROGRESS NOTES
[employer] interrogation contact rep, Adam Carrera, stated he will try to come interrogate the patient's pacemaker today.  However, due to his case load today, it will likely be tomorrow.     [phone] press *

## 2021-06-08 NOTE — PROGRESS NOTES
Comprehensive Nutrition Assessment    Type and Reason for Visit: NIKKIE nutrition re-screen/LOS    Nutrition Recommendations/Plan:   Continue current diet  Nursing to monitor BM/diarrhea, I/Os, %PO    Nutrition Assessment:  Admitted 2/2 cellulitis and unhealing leg ucler. Current appetite reported as good and has improved since admission. Per pt 75+%PO Cardiac Restriction: Low Fat/Low Chol/High Fiber/ELIZABETH. Nutrition intervention- RD to add food pref. Labs: Meds reviewed. Malnutrition Assessment:  Malnutrition Status:  No malnutrition      Nutrition Related Findings:  No NFPE performed, appears nourished. Denies N/V/C. Report BM 6/8 loose/diarrhea. RN aware of diarrhea. RD reviewed diarrhea MNT and encouraged hydration and BRAT/bland foods. Denies C/S issues. No edema documented.       Wounds:    Venous stasis (Right left venous ucler)       Current Nutrition Therapies:  ADULT DIET Regular; Low Fat/Low Chol/High Fiber/ELIZABETH; No Salt Added (3-4 gm)    Anthropometric Measures:  · Height:  5' 11\" (180.3 cm)  · Current Body Wt:  171.3 kg (377 lb 10.4 oz)   · Admission Body Wt:  365 lb    · Usual Body Wt:  166.5 kg (367 lb)     · Ideal Body Wt:  172 lbs:  219.6 %    · BMI Category:  Obese class 3 (BMI 40.0 or greater)     Wt Readings from Last 3 Encounters:   06/07/21 (!) 171.3 kg (377 lb 11.2 oz)   03/29/21 136.1 kg (300 lb)   11/23/20 (!) 166.5 kg (367 lb)   ·     Nutrition Interventions:   Food and/or Nutrient Delivery: Continue current diet  Nutrition Education and Counseling: No recommendations at this time  Coordination of Nutrition Care: No recommendation at this time      Nutrition Monitoring and Evaluation:   Behavioral-Environmental Outcomes: None identified  Food/Nutrient Intake Outcomes: Food and nutrient intake  Physical Signs/Symptoms Outcomes: Biochemical data, Weight, Diarrhea    Discharge Planning:    Continue current diet     Electronically signed by Carlos Heredia RD on 6/8/2021 at 12:41 PM    Contact: Ext [address] [place] [address]

## 2021-06-08 NOTE — PROGRESS NOTES
CM met with pt at the bedside to f/up on his plan of care. Choice obtained for [facility] and no preference for an infusion company for his iv abx. Cm presented and discussed IMM. IMM obtained. Referrals made via [facility].

## 2021-06-08 NOTE — PROGRESS NOTES
Infectious Disease Progress Note           Subjective:   Pt seen and examined at bedside. Doing well, denies new complaints. No acute events since last seen. Pt states he is unable to afford IV antibiotics. Objective:   Physical Exam:     Visit Vitals  /66 (BP 1 Location: Right lower arm, BP Patient Position: At rest)   Pulse 83   Temp 98.4 °F (36.9 °C)   Resp 18   Ht 5' 11\" (1.803 m)   Wt (!) 377 lb 11.2 oz (171.3 kg)   SpO2 97%   BMI 52.68 kg/m²    O2 Flow Rate (L/min): 3 l/min O2 Device: CPAP mask    Temp (24hrs), Av.1 °F (36.7 °C), Min:97.7 °F (36.5 °C), Max:98.4 °F (36.9 °C)    No intake/output data recorded.     1901 -  0700  In: -   Out: 2700 [Urine:2700]    General: NAD, alert, AAO x 4  HEENT: SURYA, Moist mucosa   Lungs: CTA b/l, decreased at the bases, no wheeze/rhonchi   Heart: S1S2+, RRR, no murmur  Abdo: Soft, NT, ND, +BS   Exts: resolving b/l leg edema, hyperpigmented skin changes   Skin: superficial ulcerations involving lateral surfaces of both legs     Data Review:       Recent Days:  Recent Labs     21  0659 21  1111   WBC 6.6 6.6 6.7   HGB 11.8* 11.7* 11.4*   HCT 39.8 38.9 38.4    300 323     Recent Labs     21  0621  0659 21  1111   BUN 20 22* 26*   CREA 1.26 1.19 1.34*       Microbiology     Results     Procedure Component Value Units Date/Time    CULTURE, BLOOD #1 [[id number]] Collected: 21    Order Status: Completed Specimen: Blood Updated: 21     Special Requests: No Special Requests        Culture result: No growth 5 days       CULTURE, BLOOD #2 [[id number]] Collected: 21    Order Status: Completed Specimen: Blood Updated: 21     Special Requests: No Special Requests        Culture result: No growth 5 days       CULTURE, BLOOD, PAIRED [[id number]] Collected: 21    Order Status: Completed Specimen: Blood Updated: 21 7917     Special Requests: No Special Requests        Culture result: No growth 6 days            Diagnostics   CXR Results  (Last 48 hours)    None           Assessment/Plan     1. Infected venous stasis ulcers involving b/l LEs L>R, chronic w superinfection       E faecalis, E. Cloacae and Alcaligenes faecalis isolated from wound Cx on 05/10      Pt's insurance does not cover out pt IV antibiotics and he cant afford out of pocket payments      Mentioned he will rather be discharged on pills, which is reasonable since his stasis  ulcers are unlikely to be cured w long term IV abx. They may improved but will recur given non-compliance w other tx modalities including leg elevation       Wound Cx not done on current admission as ordered      Continue on Zosyn day # 7 while hospitalized, consider  Ciprofloxacin and Augmentin upon d/c       Pt is non-compliant w leg elevation. Continue routine wound care         2. B/l leg cellulitis: Clinically improved, decreased swelling and erythema      3. B/l LE pain/discomfort: Continue symptomatic mgt        4.  CHF w acute exacerbation, sick sinus syndrome: Being followed by cardiology       Pacemaker being interrogated by cardiology       Miranda Quijano MD    6/8/2021

## 2021-06-08 NOTE — PROGRESS NOTES
PULMONARY NOTE  [facility]    Name: Vince Wiley MRN: [id number]   : 1955 Hospital: [facility]   Date: 2021  Admission date: 2021 Hospital Day: 8       HPI:     Hospital Problems  Date Reviewed: 2021        Codes Class Noted POA    Cellulitis and abscess of right leg ICD-10-CM: L03.115, L02.415  ICD-9-CM: 682.6  2021 Unknown        Cellulitis ICD-10-CM: L03.90  ICD-9-CM: 682.9  2021 Unknown                   [x] High complexity decision making was performed  [x] See my orders for details      Subjective/Initial History:     I was asked by Maite Anguiano MD to see Vince Wiley  a 76 y.o.  male in consultation     Excerpts from admission 2021 or consult notes as follows:   40-year-old morbidly obese gentleman came in because of leg pain and swelling he has history of venous stasis leg edema diabetes mellitus, significant past medical history of COPD chronic hypoxic hypercapnic respiratory failure on trilogy noninvasive ventilator at home he was seen by me about a month ago he was doing fairly well he was compliant with his trilogy ventilator came in because of swelling of the lower extremities his BNP was elevated.        Allergies   Allergen Reactions    Elavil Unknown (comments)    Sulfa (Sulfonamide Antibiotics) Nausea and Vomiting        MAR reviewed and pertinent medications noted or modified as needed     Current Facility-Administered Medications   Medication    nystatin (MYCOSTATIN) 100,000 unit/gram cream    vitamin E acetate capsule 800 Units    metFORMIN (GLUCOPHAGE) tablet 1,000 mg    ammonium lactate (AMLACTIN) 12 % cream    insulin glargine (LANTUS) injection 32 Units    insulin glargine (LANTUS) injection 80 Units    Saccharomyces boulardii (FLORASTOR) capsule 250 mg    colchicine tablet 0.6 mg    piperacillin-tazobactam (ZOSYN) 3.375 g in 0.9% sodium chloride (MBP/ADV) 100 mL MBP    glucose chewable tablet 16 g    dextrose (D50W) injection syrg 12.5-25 g    glucagon (GLUCAGEN) injection 1 mg    albuterol (PROVENTIL HFA, VENTOLIN HFA, PROAIR HFA) inhaler 2 Puff    ascorbic acid (vitamin C) (VITAMIN C) tablet 1,000 mg    aspirin chewable tablet 81 mg    ferrous sulfate tablet 325 mg    fluticasone propionate (FLONASE) 50 mcg/actuation nasal spray 2 Spray    gabapentin (NEURONTIN) capsule 300 mg    multivitamin (ONE A DAY) tablet 1 Tablet    tamsulosin (FLOMAX) capsule 0.4 mg    tiotropium bromide (SPIRIVA RESPIMAT) 2.5 mcg /actuation    insulin lispro (HUMALOG) injection    acetaminophen (TYLENOL) tablet 650 mg    Or    acetaminophen (TYLENOL) suppository 650 mg    polyethylene glycol (MIRALAX) packet 17 g    promethazine (PHENERGAN) tablet 12.5 mg    Or    ondansetron (ZOFRAN) injection 4 mg    oxyCODONE IR (ROXICODONE) tablet 10 mg    bumetanide (BUMEX) injection 1 mg      Patient PCP: Raman Miranda MD  PMH:  has a past medical history of Arthritis, Chronic obstructive pulmonary disease ([facility] [address] Diabetes ([facility] [address] Gout, Hypertension, Ill-defined condition, and Sleep apnea. PSH:   has a past surgical history that includes hx orthopaedic and hx vein ablation adhesive. FHX: family history includes Diabetes in his brother, mother, and sister; Heart Disease in his father and mother; Hypertension in his father and mother; Kidney Disease in his mother. SHX:  reports that he has never smoked. He has never used smokeless tobacco. He reports current alcohol use. He reports that he does not use drugs. ROS:    Review of Systems   Constitutional: Positive for malaise/fatigue. HENT: Negative. Eyes: Negative. Respiratory: Positive for shortness of breath. Cardiovascular: Positive for orthopnea and claudication. Gastrointestinal: Negative. Genitourinary: Negative. Musculoskeletal:        Bilateral leg edema   Skin: Negative. Neurological: Negative. Psychiatric/Behavioral: Negative. Objective:     Vital Signs: Telemetry:    normal sinus rhythm Intake/Output:   Visit Vitals  /70 (BP 1 Location: Right lower arm, BP Patient Position: At rest)   Pulse 83   Temp 97.7 °F (36.5 °C)   Resp 18   Ht 5' 11\" (1.803 m)   Wt (!) 171.3 kg (377 lb 11.2 oz)   SpO2 96%   BMI 52.68 kg/m²       Temp (24hrs), Av.9 °F (36.6 °C), Min:97.4 °F (36.3 °C), Max:98.2 °F (36.8 °C)        O2 Device: None (Room air) O2 Flow Rate (L/min): 3 l/min       Wt Readings from Last 4 Encounters:   21 (!) 171.3 kg (377 lb 11.2 oz)   21 136.1 kg (300 lb)   20 (!) 166.5 kg (367 lb)          Intake/Output Summary (Last 24 hours) at 2021 0947  Last data filed at 2021 0326  Gross per 24 hour   Intake --   Output 1200 ml   Net -1200 ml       Last shift:      No intake/output data recorded. Last 3 shifts:  1901 -  0700  In: -   Out: 2700 [Urine:2700]       Physical Exam:     Physical Exam  Constitutional:       Appearance: He is obese. HENT:      Head: Normocephalic and atraumatic. Nose: Nose normal.      Mouth/Throat:      Mouth: Mucous membranes are dry. Eyes:      Pupils: Pupils are equal, round, and reactive to light. Cardiovascular:      Rate and Rhythm: Normal rate and regular rhythm. Pulses: Normal pulses. Heart sounds: Normal heart sounds. Pulmonary:      Effort: Respiratory distress present. Breath sounds: Rales present. Abdominal:      General: Abdomen is flat. Bowel sounds are normal.      Palpations: Abdomen is soft. Musculoskeletal:         General: Swelling present. Cervical back: Normal range of motion and neck supple. Right lower leg: Edema present. Left lower leg: Edema present. Comments: Erythema tenderness on deep palpation  bilateral lower extremities   Neurological:      Mental Status: He is alert.           Labs:    Recent Labs     21  0621 21  0659 21  1111   WBC 6.6 6.6 6.7   HGB 11.8* 11.7* 11.4*  300 323     Recent Labs     06/07/21  0659 06/06/21  1111 06/05/21  1502    139 142   K 3.7 4.1 4.4    106 108   CO2 28 28 24   GLU 76 157* 108*   BUN 22* 26* 29*   CREA 1.19 1.34* 1.42*   CA 9.2 9.1 8.9   ALB 2.9* 3.0* 3.0*   ALT 38 42 43     No results for input(s): PH, PCO2, PO2, HCO3, FIO2 in the last 72 hours. No results for input(s): CPK, CKNDX, TROIQ in the last 72 hours. No lab exists for component: CPKMB  No results found for: BNPP, BNP   Lab Results   Component Value Date/Time    Culture result: No growth 5 days 06/02/2021 12:31 PM    Culture result: No growth 5 days 06/02/2021 12:31 PM    Culture result: No growth 6 days 06/01/2021 08:15 PM   No results found for: TSH, TSHEXT, TSHEXT    Imaging:    CXR Results  (Last 48 hours)    None        Results from [place] encounter on 06/01/21    XR CHEST PORT    Narrative  Portable chest, 1949 hours. Comparison with 6/22/2020. Impression  Stable mild-to-moderate enlargement of cardiomediastinal silhouette. Mediastinum underpenetrated with suboptimal visualization of right atrial and  right ventricular pacing leads. There is obscuration of a portion of the left  hemithorax by the generator device. No pneumothorax. No evidence of pulmonary  edema, air space pneumonia, or pleural effusion. No results found for this or any previous visit. IMPRESSION:   1. Acute on chronic hypoxic hypercapnic respiratory failure  2. Chronic Obstructive Pulmonary Disease   3. Venous stasis ulcer with leg wound chronic leg edema possible cellulitis  4. CHF with diastolic dysfunction  5. Obstructive sleep apnea  6. Obesity gout hypertension type 2 diabetes  7. Acute kidney injury      RECOMMENDATIONS/PLAN:     1. Trilogy for non invasive ventilatory life support to prevent worsening respiratory acidosis  2. Patient is not actively wheezing no need for any systemic steroids  3. Continue with Bumex  4. Patient is on Zosyn  5.  Vascular surgeon on case for peripheral vascular edema and cellulitis       Stasia Goldmann, MD

## 2021-06-08 NOTE — DISCHARGE SUMMARY
Discharge Summary       PATIENT ID: Elizabeth Fish  MRN: [id number]   YOB: 1955    DATE OF ADMISSION: 6/1/2021  6:56 PM    DATE OF DISCHARGE:   PRIMARY CARE PROVIDER: Berto Ortiz MD     ATTENDING PHYSICIAN: Orin López  DISCHARGING PROVIDER: Orin López      CONSULTATIONS: IP CONSULT TO INFECTIOUS DISEASES  IP CONSULT TO INFECTIOUS DISEASES  IP CONSULT TO VASCULAR SURGERY  IP CONSULT TO PULMONOLOGY  IP CONSULT TO CARDIOLOGY    PROCEDURES/SURGERIES: * No surgery found *    ADMITTING DIAGNOSES:    Patient Active Problem List    Diagnosis Date Noted    Cellulitis and abscess of right leg 06/02/2021    Cellulitis 06/01/2021    COVID-19 01/04/2021    Type 2 diabetes mellitus with diabetic polyneuropathy, with long-term current use of insulin ([facility] [address] 11/23/2020    Hyperkeratosis 11/23/2020    Onychomycosis 11/23/2020    Localized edema 09/21/2020    Idiopathic chronic venous hypertension of left lower extremity with ulcer ([facility] [address] 09/14/2020       DISCHARGE DIAGNOSES / PLAN:      Acute cellulitis of both legs  Venous stasis ulcer  Chronic hypoxemic respiratory failure  Acute on chronic kidney disease  History of CHF diastolic dysfunction  COPD  Type 2 diabetes  Hypertension  Gout  Arthritis   Sleep Apnea   Morbid obesity  Superficial Thrombosis of left greater saphenous vein   Ejection fraction about 40 to 45%      DISCHARGE MEDICATIONS:  Current Discharge Medication List      START taking these medications    Details   ammonium lactate (AMLACTIN) 12 % topical cream Apply  to affected area as needed for Other (xerosis). rub in to affected area well  Indications: dry skin  Qty: 280 g, Refills: 0  Start date: 6/8/2021      Saccharomyces boulardii (FLORASTOR) 250 mg capsule Take 1 Capsule by mouth two (2) times a day for 7 days.   Qty: 14 Capsule, Refills: 0  Start date: 6/8/2021, End date: 6/15/2021      vitamin E, dl,tocopheryl acet, (vitamin E acetate) 400 unit capsule Take 2 Capsules by mouth two (2) times a day. Qty: 100 Capsule, Refills: 0  Start date: 6/8/2021      piperacillin-tazobactam 3.375 gram 3.375 g IVPB 3.375 g by IntraVENous route every eight (8) hours for 14 days. Qty: 42 Dose, Refills: 0  Start date: 6/7/2021, End date: 6/21/2021         CONTINUE these medications which have NOT CHANGED    Details   ascorbic acid, vitamin C, (Vitamin C) 500 mg tablet Take 1,000 mg by mouth two (2) times a day. Indications: inadequate vitamin C      tamsulosin (Flomax) 0.4 mg capsule Take 0.4 mg by mouth daily. tiotropium (Spiriva with HandiHaler) 18 mcg inhalation capsule Take 1 Capsule by inhalation daily. bumetanide (BUMEX) 2 mg tablet Take 2 mg by mouth two (2) times a day. vitamin e (E GEMS) 1,000 unit capsule Take 1,000 Units by mouth two (2) times a day. multivitamin (ONE A DAY) tablet Take 1 Tab by mouth daily. aspirin 81 mg chewable tablet Take 81 mg by mouth daily. flunisolide (NASAREL) 25 mcg (0.025 %) spry 2 Sprays two (2) times a day. albuterol (ProAir HFA) 90 mcg/actuation inhaler Take  by inhalation. colchicine 0.6 mg tablet Take 0.6 mg by mouth daily. gabapentin (NEURONTIN) 300 mg capsule Take 300 mg by mouth four (4) times daily. oxyCODONE IR (ROXICODONE) 5 mg immediate release tablet Take 5 mg by mouth every twelve (12) hours. metFORMIN (GLUCOPHAGE) 500 mg tablet Take 1,000 mg by mouth two (2) times daily (with meals). insulin lispro (HumaLOG U-100 Insulin) 100 unit/mL injection by SubCUTAneous route. Sliding scale      ferrous sulfate (Iron) 325 mg (65 mg iron) tablet Take  by mouth Daily (before breakfast). insulin glargine (LANTUS) 100 unit/mL injection by SubCUTAneous route nightly.  80units am/ 20units pm         STOP taking these medications       multivits,Stress Formula-Zinc tablet Comments:   Reason for Stopping:         ampicillin (PRINCIPEN) 500 mg capsule Comments:   Reason for Stopping: hydroCHLOROthiazide (HYDRODIURIL) 25 mg tablet Comments:   Reason for Stopping:                 NOTIFY YOUR PHYSICIAN FOR ANY OF THE FOLLOWING:   Fever over 101 degrees for 24 hours. Chest pain, shortness of breath, fever, chills, nausea, vomiting, diarrhea, change in mentation, falling, weakness, bleeding. Severe pain or pain not relieved by medications. Or, any other signs or symptoms that you may have questions about. DISPOSITION:  x  Home With:   OT  PT  HH  RN       Long term SNF/Inpatient Rehab    Independent/assisted living    Hospice    Other:       PATIENT CONDITION AT DISCHARGE: Stable      PHYSICAL EXAMINATION AT DISCHARGE:  General:          Alert, cooperative, no distress, appears stated age. HEENT:           Atraumatic, anicteric sclerae, pink conjunctivae                          No oral ulcers, mucosa moist, throat clear, dentition fair  Neck:               Supple, symmetrical  Lungs:             Clear to auscultation bilaterally. No Wheezing or Rhonchi. No rales. Chest wall:      No tenderness  No Accessory muscle use. Heart:              Regular  rhythm,  No  murmur   No edema  Abdomen:        Soft, non-tender. Not distended. Bowel sounds normal  Extremities:     No cyanosis. No clubbing,                            Skin turgor normal, Capillary refill normal  Skin:                Not pale. Not Jaundiced  No rashes   Psych:             Not anxious or agitated. Neurologic:      Alert, moves all extremities, answers questions appropriately and responds to commands     XR CHEST PORT   Final Result   Stable mild-to-moderate enlargement of cardiomediastinal silhouette. Mediastinum underpenetrated with suboptimal visualization of right atrial and   right ventricular pacing leads. There is obscuration of a portion of the left   hemithorax by the generator device. No pneumothorax. No evidence of pulmonary   edema, air space pneumonia, or pleural effusion.            Recent Results (from the past 24 hour(s))   GLUCOSE, POC    Collection Time: 06/07/21  5:10 PM   Result Value Ref Range    Glucose (POC) 124 (H) 65 - 117 mg/dL    Performed by Freddy Early    GLUCOSE, POC    Collection Time: 06/07/21  7:55 PM   Result Value Ref Range    Glucose (POC) 140 (H) 65 - 117 mg/dL    Performed by Bijal Sims    CBC WITH AUTOMATED DIFF    Collection Time: 06/08/21  6:21 AM   Result Value Ref Range    WBC 6.6 4.1 - 11.1 K/uL    RBC 4.31 4.10 - 5.70 M/uL    HGB 11.8 (L) 12.1 - 17.0 g/dL    HCT 39.8 36.6 - 50.3 %    MCV 92.3 80.0 - 99.0 FL    MCH 27.4 26.0 - 34.0 PG    MCHC 29.6 (L) 30.0 - 36.5 g/dL    RDW 15.0 (H) 11.5 - 14.5 %    PLATELET 805 798 - 633 K/uL    MPV 10.9 8.9 - 12.9 FL    NRBC 0.0 0.0  WBC    ABSOLUTE NRBC 0.00 0.00 - 0.01 K/uL    NEUTROPHILS 59 32 - 75 %    LYMPHOCYTES 28 12 - 49 %    MONOCYTES 9 5 - 13 %    EOSINOPHILS 4 0 - 7 %    BASOPHILS 0 0 - 1 %    IMMATURE GRANULOCYTES 0 0 - 0.5 %    ABS. NEUTROPHILS 3.8 1.8 - 8.0 K/UL    ABS. LYMPHOCYTES 1.8 0.8 - 3.5 K/UL    ABS. MONOCYTES 0.6 0.0 - 1.0 K/UL    ABS. EOSINOPHILS 0.3 0.0 - 0.4 K/UL    ABS. BASOPHILS 0.0 0.0 - 0.1 K/UL    ABS. IMM. GRANS. 0.0 0.00 - 0.04 K/UL    DF AUTOMATED     METABOLIC PANEL, COMPREHENSIVE    Collection Time: 06/08/21  6:21 AM   Result Value Ref Range    Sodium 143 136 - 145 mmol/L    Potassium 3.7 3.5 - 5.1 mmol/L    Chloride 107 97 - 108 mmol/L    CO2 27 21 - 32 mmol/L    Anion gap 9 5 - 15 mmol/L    Glucose 68 65 - 100 mg/dL    BUN 20 6 - 20 mg/dL    Creatinine 1.26 0.70 - 1.30 mg/dL    BUN/Creatinine ratio 16 12 - 20      GFR est AA >60 >60 ml/min/1.73m2    GFR est non-AA 57 (L) >60 ml/min/1.73m2    Calcium 9.2 8.5 - 10.1 mg/dL    Bilirubin, total 0.3 0.2 - 1.0 mg/dL    AST (SGOT) 26 15 - 37 U/L    ALT (SGPT) 38 12 - 78 U/L    Alk.  phosphatase 71 45 - 117 U/L    Protein, total 7.9 6.4 - 8.2 g/dL    Albumin 2.9 (L) 3.5 - 5.0 g/dL    Globulin 5.0 (H) 2.0 - 4.0 g/dL    A-G Ratio 0.6 (L) 1.1 - 2.2 GLUCOSE, POC    Collection Time: 06/08/21  7:35 AM   Result Value Ref Range    Glucose (POC) 75 65 - 117 mg/dL    Performed by [address], POC    Collection Time: 06/08/21 11:08 AM   Result Value Ref Range    Glucose (POC) 104 65 - 117 mg/dL    Performed by Delfin 162:    Patient is Taco.Kami y.o. year old male with a history of COPD, diabetes, gout, hypertension, arthritis, and sleep apnea. Patient has venous ulcers on his lower legs bilaterally that were being treated by wound care for several months with no improvement. Wound care recommended admission to the hospital for multidisplinary management of his venous ulcers. He was seen in the ER 6/1 and admitted.     Patient is alert, awake, and in no acute distress. He still has leg pain, but it has improved. He denies chest pain, SOB, N/V, and abdominal pain.     Patient was seen by the cardiology and also seen by infectious disease during this admission and also seen by the pulmonologist regarding sleep apnea    Wound culture was negative seen by infectious disease and recommended to discharge patient IV Zosyn PICC line was ordered for today/patient seen by the pulmonologist and continue trilogy ventilator patient was seen by the vascular surgeon recommend to keep the leg elevated keep the wounds clean,     Patient waiting for intubation for pacemaker and PICC line before discharge discharge home with home health PT OT and wound care nurse    Medication reconciliation done time discharge patient 35 minutes 50% time spent counseling and coordination of care      Signed:   Ladd Holter, MD  6/8/2021  12:06 PM

## 2021-06-09 ENCOUNTER — APPOINTMENT (OUTPATIENT)
Dept: GENERAL RADIOLOGY | Age: 66
DRG: 299 | End: 2021-06-09
Attending: FAMILY MEDICINE
Payer: MEDICARE

## 2021-06-09 VITALS
WEIGHT: 315 LBS | OXYGEN SATURATION: 99 % | BODY MASS INDEX: 44.1 KG/M2 | SYSTOLIC BLOOD PRESSURE: 134 MMHG | HEIGHT: 71 IN | HEART RATE: 88 BPM | DIASTOLIC BLOOD PRESSURE: 83 MMHG | RESPIRATION RATE: 20 BRPM | TEMPERATURE: 97.6 F

## 2021-06-09 LAB
BACTERIA SPEC CULT: NORMAL
BACTERIA SPEC CULT: NORMAL
GLUCOSE BLD STRIP.AUTO-MCNC: 129 MG/DL (ref 65–117)
GLUCOSE BLD STRIP.AUTO-MCNC: 131 MG/DL (ref 65–117)
GLUCOSE BLD STRIP.AUTO-MCNC: 151 MG/DL (ref 65–117)
GLUCOSE BLD STRIP.AUTO-MCNC: 97 MG/DL (ref 65–117)
PERFORMED BY, TECHID: ABNORMAL
PERFORMED BY, TECHID: NORMAL
SPECIAL REQUESTS,SREQ: NORMAL
SPECIAL REQUESTS,SREQ: NORMAL

## 2021-06-09 PROCEDURE — 36569 INSJ PICC 5 YR+ W/O IMAGING: CPT

## 2021-06-09 PROCEDURE — 74011250637 HC RX REV CODE- 250/637

## 2021-06-09 PROCEDURE — 74011000250 HC RX REV CODE- 250: Performed by: INTERNAL MEDICINE

## 2021-06-09 PROCEDURE — 82962 GLUCOSE BLOOD TEST: CPT

## 2021-06-09 PROCEDURE — 74011636637 HC RX REV CODE- 636/637: Performed by: FAMILY MEDICINE

## 2021-06-09 PROCEDURE — 02HV33Z INSERTION OF INFUSION DEVICE INTO SUPERIOR VENA CAVA, PERCUTANEOUS APPROACH: ICD-10-PCS | Performed by: FAMILY MEDICINE

## 2021-06-09 PROCEDURE — 99232 SBSQ HOSP IP/OBS MODERATE 35: CPT | Performed by: INTERNAL MEDICINE

## 2021-06-09 PROCEDURE — 74011250637 HC RX REV CODE- 250/637: Performed by: FAMILY MEDICINE

## 2021-06-09 PROCEDURE — 99232 SBSQ HOSP IP/OBS MODERATE 35: CPT | Performed by: SURGERY

## 2021-06-09 PROCEDURE — 74011000258 HC RX REV CODE- 258: Performed by: FAMILY MEDICINE

## 2021-06-09 PROCEDURE — 74011250637 HC RX REV CODE- 250/637: Performed by: SURGERY

## 2021-06-09 PROCEDURE — 36573 INSJ PICC RS&I 5 YR+: CPT | Performed by: FAMILY MEDICINE

## 2021-06-09 PROCEDURE — 74011636637 HC RX REV CODE- 636/637: Performed by: INTERNAL MEDICINE

## 2021-06-09 PROCEDURE — 74011250636 HC RX REV CODE- 250/636: Performed by: FAMILY MEDICINE

## 2021-06-09 PROCEDURE — 71045 X-RAY EXAM CHEST 1 VIEW: CPT

## 2021-06-09 PROCEDURE — 94640 AIRWAY INHALATION TREATMENT: CPT

## 2021-06-09 RX ADMIN — ASPIRIN 81 MG: 81 TABLET, CHEWABLE ORAL at 09:51

## 2021-06-09 RX ADMIN — OXYCODONE HYDROCHLORIDE AND ACETAMINOPHEN 1000 MG: 500 TABLET ORAL at 20:47

## 2021-06-09 RX ADMIN — FERROUS SULFATE TAB 325 MG (65 MG ELEMENTAL FE) 325 MG: 325 (65 FE) TAB at 09:50

## 2021-06-09 RX ADMIN — METFORMIN HYDROCHLORIDE 1000 MG: 500 TABLET ORAL at 16:19

## 2021-06-09 RX ADMIN — BUMETANIDE 1 MG: 0.25 INJECTION INTRAMUSCULAR; INTRAVENOUS at 20:47

## 2021-06-09 RX ADMIN — COLCHICINE 0.6 MG: 0.6 TABLET, FILM COATED ORAL at 09:51

## 2021-06-09 RX ADMIN — MULTIVITAMIN TABLET 1 TABLET: TABLET at 09:50

## 2021-06-09 RX ADMIN — NYSTATIN: 100000 CREAM TOPICAL at 10:03

## 2021-06-09 RX ADMIN — GABAPENTIN 300 MG: 300 CAPSULE ORAL at 09:51

## 2021-06-09 RX ADMIN — Medication 250 MG: at 09:51

## 2021-06-09 RX ADMIN — Medication 250 MG: at 20:47

## 2021-06-09 RX ADMIN — GABAPENTIN 300 MG: 300 CAPSULE ORAL at 14:41

## 2021-06-09 RX ADMIN — PIPERACILLIN AND TAZOBACTAM 3.38 G: 3; .375 INJECTION, POWDER, LYOPHILIZED, FOR SOLUTION INTRAVENOUS at 06:14

## 2021-06-09 RX ADMIN — METFORMIN HYDROCHLORIDE 1000 MG: 500 TABLET ORAL at 09:50

## 2021-06-09 RX ADMIN — OXYCODONE HYDROCHLORIDE 10 MG: 10 TABLET ORAL at 03:38

## 2021-06-09 RX ADMIN — BUMETANIDE 1 MG: 0.25 INJECTION INTRAMUSCULAR; INTRAVENOUS at 09:57

## 2021-06-09 RX ADMIN — GABAPENTIN 300 MG: 300 CAPSULE ORAL at 17:25

## 2021-06-09 RX ADMIN — INSULIN GLARGINE 80 UNITS: 100 INJECTION, SOLUTION SUBCUTANEOUS at 09:54

## 2021-06-09 RX ADMIN — Medication 800 UNITS: at 09:51

## 2021-06-09 RX ADMIN — TAMSULOSIN HYDROCHLORIDE 0.4 MG: 0.4 CAPSULE ORAL at 09:50

## 2021-06-09 RX ADMIN — OXYCODONE HYDROCHLORIDE 10 MG: 10 TABLET ORAL at 16:02

## 2021-06-09 RX ADMIN — INSULIN LISPRO 3 UNITS: 100 INJECTION, SOLUTION INTRAVENOUS; SUBCUTANEOUS at 16:19

## 2021-06-09 RX ADMIN — FLUTICASONE PROPIONATE 2 SPRAY: 50 SPRAY, METERED NASAL at 10:02

## 2021-06-09 RX ADMIN — GABAPENTIN 300 MG: 300 CAPSULE ORAL at 20:47

## 2021-06-09 RX ADMIN — Medication 800 UNITS: at 20:47

## 2021-06-09 RX ADMIN — OXYCODONE HYDROCHLORIDE AND ACETAMINOPHEN 1000 MG: 500 TABLET ORAL at 09:51

## 2021-06-09 RX ADMIN — PIPERACILLIN AND TAZOBACTAM 3.38 G: 3; .375 INJECTION, POWDER, LYOPHILIZED, FOR SOLUTION INTRAVENOUS at 14:42

## 2021-06-09 RX ADMIN — OXYCODONE HYDROCHLORIDE 10 MG: 10 TABLET ORAL at 09:51

## 2021-06-09 NOTE — PROGRESS NOTES
Infectious Disease Progress Note           Subjective:   Doing well, denies new complaints. No acute events since last seen.  Scheduled for d/c home today, agreeable w out pt IV antibiotics   Objective:   Physical Exam:     Visit Vitals  /80 (BP 1 Location: Right lower arm, BP Patient Position: Lying left side)   Pulse 80   Temp 97.9 °F (36.6 °C)   Resp 20   Ht 5' 11\" (1.803 m)   Wt (!) 372 lb 14.4 oz (169.1 kg)   SpO2 100%   BMI 52.01 kg/m²    O2 Flow Rate (L/min): 3 l/min O2 Device: BIPAP    Temp (24hrs), Av.1 °F (36.7 °C), Min:97.9 °F (36.6 °C), Max:98.4 °F (36.9 °C)    701 - 1900  In: -   Out: 400 [Urine:400]   1901 - 700  In: -   Out: 2000 [Urine:2000]    General: NAD, alert, AAO x 4  HEENT: SURYA, Moist mucosa   Lungs: CTA b/l, decreased at the bases, no wheeze/rhonchi   Heart: S1S2+, RRR, no murmur  Abdo: Soft, NT, ND, +BS   Exts: resolving b/l leg edema, hyperpigmented skin changes   Skin: superficial ulcerations involving lateral surfaces of both legs     Data Review:       Recent Days:  Recent Labs     21  0621 21  0659   WBC 6.6 6.6   HGB 11.8* 11.7*   HCT 39.8 38.9    300     Recent Labs     21  0621 21  0659   BUN 20 22*   CREA 1.26 1.19       Microbiology     Results     Procedure Component Value Units Date/Time    CULTURE, BLOOD #1 [[id number]] Collected: 21    Order Status: Completed Specimen: Blood Updated: 21     Special Requests: No Special Requests        Culture result: No growth 6 days       CULTURE, BLOOD #2 [[id number]] Collected: 21    Order Status: Completed Specimen: Blood Updated: 21     Special Requests: No Special Requests        Culture result: No growth 6 days       CULTURE, BLOOD, PAIRED [[id number]] Collected: 21    Order Status: Completed Specimen: Blood Updated: 21 9103     Special Requests: No Special Requests        Culture result: No growth 6 days            Diagnostics   CXR Results  (Last 48 hours)    None           Assessment/Plan     1. Infected venous stasis ulcers involving b/l LEs L>R, chronic w superinfection       E faecalis, E. Cloacae and Alcaligenes faecalis isolated from wound Cx on 05/10      Afebrile, WBC normalized. Now claims he can afford IV antibiotics      On day # 9 of Zosyn, will continue for 2 more wks       Routine labs in the morning. PICC line to be placed           2. B/l leg cellulitis: Still w some LE swelling, remains on diuretics      3. B/l LE pain/discomfort: Continue symptomatic mgt        4.  CHF w acute exacerbation, sick sinus syndrome: Denies new complaints       Pacemaker to be interrogated today, following which pt may  Be discharged home     Keith Mcintyre MD    6/9/2021

## 2021-06-09 NOTE — PROGRESS NOTES
PULMONARY NOTE  [facility]    Name: Elizabeth Fish MRN: [id number]   : 1955 Hospital: [address] [place] [address]   Date: 2021  Admission date: 2021 Hospital Day: 9       HPI:     Hospital Problems  Date Reviewed: 2021        Codes Class Noted POA    Cellulitis and abscess of right leg ICD-10-CM: L03.115, L02.415  ICD-9-CM: 682.6  2021 Unknown        Cellulitis ICD-10-CM: L03.90  ICD-9-CM: 682.9  2021 Unknown                   [x] High complexity decision making was performed  [x] See my orders for details      Subjective/Initial History:     I was asked by Orin López MD to see Elizabeth Fish  a 76 y.o.  male in consultation     Excerpts from admission 2021 or consult notes as follows:   76-year-old morbidly obese gentleman came in because of leg pain and swelling he has history of venous stasis leg edema diabetes mellitus, significant past medical history of COPD chronic hypoxic hypercapnic respiratory failure on trilogy noninvasive ventilator at home he was seen by me about a month ago he was doing fairly well he was compliant with his trilogy ventilator came in because of swelling of the lower extremities his BNP was elevated.        Allergies   Allergen Reactions    Elavil Unknown (comments)    Sulfa (Sulfonamide Antibiotics) Nausea and Vomiting        MAR reviewed and pertinent medications noted or modified as needed     Current Facility-Administered Medications   Medication    nystatin (MYCOSTATIN) 100,000 unit/gram cream    vitamin E acetate capsule 800 Units    metFORMIN (GLUCOPHAGE) tablet 1,000 mg    ammonium lactate (AMLACTIN) 12 % cream    insulin glargine (LANTUS) injection 32 Units    insulin glargine (LANTUS) injection 80 Units    Saccharomyces boulardii (FLORASTOR) capsule 250 mg    colchicine tablet 0.6 mg    piperacillin-tazobactam (ZOSYN) 3.375 g in 0.9% sodium chloride (MBP/ADV) 100 mL MBP    glucose chewable tablet 16 g    dextrose (D50W) injection syrg 12.5-25 g    glucagon (GLUCAGEN) injection 1 mg    albuterol (PROVENTIL HFA, VENTOLIN HFA, PROAIR HFA) inhaler 2 Puff    ascorbic acid (vitamin C) (VITAMIN C) tablet 1,000 mg    aspirin chewable tablet 81 mg    ferrous sulfate tablet 325 mg    fluticasone propionate (FLONASE) 50 mcg/actuation nasal spray 2 Spray    gabapentin (NEURONTIN) capsule 300 mg    multivitamin (ONE A DAY) tablet 1 Tablet    tamsulosin (FLOMAX) capsule 0.4 mg    tiotropium bromide (SPIRIVA RESPIMAT) 2.5 mcg /actuation    insulin lispro (HUMALOG) injection    acetaminophen (TYLENOL) tablet 650 mg    Or    acetaminophen (TYLENOL) suppository 650 mg    polyethylene glycol (MIRALAX) packet 17 g    promethazine (PHENERGAN) tablet 12.5 mg    Or    ondansetron (ZOFRAN) injection 4 mg    oxyCODONE IR (ROXICODONE) tablet 10 mg    bumetanide (BUMEX) injection 1 mg      Patient PCP: Adeline Lujan MD  PMH:  has a past medical history of Arthritis, Chronic obstructive pulmonary disease ([facility] [address] Diabetes ([facility] [address] Gout, Hypertension, Ill-defined condition, and Sleep apnea. PSH:   has a past surgical history that includes hx orthopaedic and hx vein ablation adhesive. FHX: family history includes Diabetes in his brother, mother, and sister; Heart Disease in his father and mother; Hypertension in his father and mother; Kidney Disease in his mother. SHX:  reports that he has never smoked. He has never used smokeless tobacco. He reports current alcohol use. He reports that he does not use drugs. ROS:    Review of Systems   Constitutional: Positive for malaise/fatigue. HENT: Negative. Eyes: Negative. Respiratory: Positive for shortness of breath. Cardiovascular: Positive for orthopnea and claudication. Gastrointestinal: Negative. Genitourinary: Negative. Musculoskeletal:        Bilateral leg edema   Skin: Negative. Neurological: Negative. Psychiatric/Behavioral: Negative. Objective:     Vital Signs: Telemetry:    normal sinus rhythm Intake/Output:   Visit Vitals  /80 (BP 1 Location: Right lower arm, BP Patient Position: Lying left side)   Pulse 80   Temp 97.9 °F (36.6 °C)   Resp 20   Ht 5' 11\" (1.803 m)   Wt (!) 169.1 kg (372 lb 14.4 oz)   SpO2 100%   BMI 52.01 kg/m²       Temp (24hrs), Av.1 °F (36.7 °C), Min:97.9 °F (36.6 °C), Max:98.4 °F (36.9 °C)        O2 Device: BIPAP O2 Flow Rate (L/min): 3 l/min       Wt Readings from Last 4 Encounters:   21 (!) 169.1 kg (372 lb 14.4 oz)   21 136.1 kg (300 lb)   20 (!) 166.5 kg (367 lb)          Intake/Output Summary (Last 24 hours) at 2021 1018  Last data filed at 2021 1005  Gross per 24 hour   Intake --   Output 1200 ml   Net -1200 ml       Last shift:       0701 -  190  In: -   Out: 400 [Urine:400]  Last 3 shifts: 1901 -  07  In: -   Out: 2000 [Urine:2000]       Physical Exam:     Physical Exam  Constitutional:       Appearance: He is obese. HENT:      Head: Normocephalic and atraumatic. Nose: Nose normal.      Mouth/Throat:      Mouth: Mucous membranes are dry. Eyes:      Pupils: Pupils are equal, round, and reactive to light. Cardiovascular:      Rate and Rhythm: Normal rate and regular rhythm. Pulses: Normal pulses. Heart sounds: Normal heart sounds. Pulmonary:      Effort: Respiratory distress present. Breath sounds: Rales present. Abdominal:      General: Abdomen is flat. Bowel sounds are normal.      Palpations: Abdomen is soft. Musculoskeletal:         General: Swelling present. Cervical back: Normal range of motion and neck supple. Right lower leg: Edema present. Left lower leg: Edema present. Comments: Erythema tenderness on deep palpation  bilateral lower extremities   Neurological:      Mental Status: He is alert.           Labs:    Recent Labs     21  0621 21  0659 21  1111   WBC 6.6 6.6 6.7   HGB 11.8* 11.7* 11.4*    300 323     Recent Labs     06/08/21  0621 06/07/21  0659 06/06/21  1111    141 139   K 3.7 3.7 4.1    108 106   CO2 27 28 28   GLU 68 76 157*   BUN 20 22* 26*   CREA 1.26 1.19 1.34*   CA 9.2 9.2 9.1   ALB 2.9* 2.9* 3.0*   ALT 38 38 42     No results for input(s): PH, PCO2, PO2, HCO3, FIO2 in the last 72 hours. No results for input(s): CPK, CKNDX, TROIQ in the last 72 hours. No lab exists for component: CPKMB  No results found for: BNPP, BNP   Lab Results   Component Value Date/Time    Culture result: No growth 6 days 06/02/2021 12:31 PM    Culture result: No growth 6 days 06/02/2021 12:31 PM    Culture result: No growth 6 days 06/01/2021 08:15 PM   No results found for: TSH, TSHEXT, TSHEXT    Imaging:    CXR Results  (Last 48 hours)    None        Results from Hospital Encounter encounter on 06/01/21    XR CHEST PORT    Narrative  Portable chest, 1949 hours. Comparison with 6/22/2020. Impression  Stable mild-to-moderate enlargement of cardiomediastinal silhouette. Mediastinum underpenetrated with suboptimal visualization of right atrial and  right ventricular pacing leads. There is obscuration of a portion of the left  hemithorax by the generator device. No pneumothorax. No evidence of pulmonary  edema, air space pneumonia, or pleural effusion. No results found for this or any previous visit. IMPRESSION:   1. Acute on chronic hypoxic hypercapnic respiratory failure  2. Chronic Obstructive Pulmonary Disease   3. Venous stasis ulcer with leg wound chronic leg edema possible cellulitis  4. CHF with diastolic dysfunction  5. Obstructive sleep apnea  6. Obesity gout hypertension type 2 diabetes  7. Acute kidney injury      RECOMMENDATIONS/PLAN:     1. Trilogy for non invasive ventilatory life support to prevent worsening respiratory acidosis  2. Patient is not actively wheezing no need for any systemic steroids  3. Continue with Bumex  4.  Patient is on Zosyn  5.  Vascular surgeon on case for peripheral vascular edema and cellulitis       Handy Bynum MD

## 2021-06-09 NOTE — PROGRESS NOTES
PICC Placement Note    PRE-PROCEDURE VERIFICATION  Correct Procedure: yes  Correct Site:  yes  Temperature: Temp: 97.9 °F (36.6 °C), Temperature Source: Temp Source: Oral  Recent Labs     06/08/21  0621   BUN 20   CREA 1.26      WBC 6.6     Allergies: Elavil and Sulfa (sulfonamide antibiotics)  Education materials for PICC Care given to family: yes. See Patient Education activity for further details. PICC Booklet placed on chart: yes    PROCEDURE DETAIL  A single lumen PICC line was started for Home IV Therapy. The following documentation is in addition to the PICC properties in the lines/airways flowsheet :  Lot #: [id number]  xylocaine used: yes  Mid-Arm Circumference: 48 (cm)  Internal Catheter Length: 55 (cm)  Total Catheter External Length: 0 (cm)  Vein Selection for PICC:right basilic  Central Line Bundle followed yes  Complication Related to Insertion: none    The placement was verified by X-ray: yes. The x-ray results state the tip location is on the right side and the tip overlies the lower superior vena cava along the right mediastinal border. Line is okay to use: yes. PICC line ready for immediate use.     Marian Price RN BSN [facility]

## 2021-06-09 NOTE — PROGRESS NOTES
General Daily Progress Note          Patient Name:   Aaron George       YOB: 1955       Age:  76 y.o. Admit Date: 6/1/2021      Subjective:     Patient is S 43 y.o. year old male with a history of COPD, diabetes, gout, hypertension, arthritis, and sleep apnea. Patient has venous ulcers on his lower legs bilaterally that were being treated by wound care for several months with no improvement. Wound care recommended admission to the hospital for multidisplinary management of his venous ulcers. He was seen in the ER 6/1 and admitted. Patient is alert, awake, and in no acute distress. He is still complaining of leg pain and would like to increase his pain meds. Denies chest pain, abdominal pain, or SOB. Objective:     Visit Vitals  /80 (BP 1 Location: Right lower arm, BP Patient Position: Lying left side)   Pulse 80   Temp 97.9 °F (36.6 °C)   Resp 20   Ht 5' 11\" (1.803 m)   Wt (!) 169.1 kg (372 lb 14.4 oz)   SpO2 100%   BMI 52.01 kg/m²        Recent Results (from the past 24 hour(s))   GLUCOSE, POC    Collection Time: 06/08/21  3:14 PM   Result Value Ref Range    Glucose (POC) 114 65 - 117 mg/dL    Performed by Sam Lara    GLUCOSE, POC    Collection Time: 06/08/21  7:41 PM   Result Value Ref Range    Glucose (POC) 186 (H) 65 - 117 mg/dL    Performed by Brandi Washington    GLUCOSE, POC    Collection Time: 06/09/21  8:28 AM   Result Value Ref Range    Glucose (POC) 97 65 - 117 mg/dL    Performed by Maira Valenzuela (PCT)    GLUCOSE, POC    Collection Time: 06/09/21 11:00 AM   Result Value Ref Range    Glucose (POC) 129 (H) 65 - 117 mg/dL    Performed by Maira Valenzuela (PCT)      [unfilled]      Review of Systems    Constitutional: Negative for chills and fever. HENT: Negative. Eyes: Negative. Respiratory: Negative. Cardiovascular: Negative. Gastrointestinal: Negative for abdominal pain and nausea. Skin: Negative. Neurological: Negative. Physical Exam:      Constitutional: pt is oriented to person, place, and time. HENT:   Head: Normocephalic and atraumatic. Eyes: Pupils are equal, round, and reactive to light. EOM are normal.   Cardiovascular: Normal rate, regular rhythm and normal heart sounds. Pulmonary/Chest: Breath sounds normal. No wheezes. No rales. Exhibits no tenderness. Abdominal: Soft. Bowel sounds are normal. There is no abdominal tenderness. There is no rebound and no guarding. Musculoskeletal: Normal range of motion. Neurological: pt is alert and oriented to person, place, and time. Extremities: Venous ulcers R and L lateral lower legs with surrounding discoloration of lower extremities, currently covered with wound dressings    XR CHEST PORT   Final Result   Stable mild-to-moderate enlargement of cardiomediastinal silhouette. Mediastinum underpenetrated with suboptimal visualization of right atrial and   right ventricular pacing leads. There is obscuration of a portion of the left   hemithorax by the generator device. No pneumothorax. No evidence of pulmonary   edema, air space pneumonia, or pleural effusion.            Recent Results (from the past 24 hour(s))   GLUCOSE, POC    Collection Time: 06/08/21  3:14 PM   Result Value Ref Range    Glucose (POC) 114 65 - 117 mg/dL    Performed by Zaiad Stauffer, POC    Collection Time: 06/08/21  7:41 PM   Result Value Ref Range    Glucose (POC) 186 (H) 65 - 117 mg/dL    Performed by Remberto Tijerina    GLUCOSE, POC    Collection Time: 06/09/21  8:28 AM   Result Value Ref Range    Glucose (POC) 97 65 - 117 mg/dL    Performed by Rolinda Schaumann (PCT)    GLUCOSE, POC    Collection Time: 06/09/21 11:00 AM   Result Value Ref Range    Glucose (POC) 129 (H) 65 - 117 mg/dL    Performed by Rolinda Schaumann (PCT)        Results     Procedure Component Value Units Date/Time    CULTURE, BLOOD #1 [[id number]] Collected: 06/02/21 1231    Order Status: Completed Specimen: Blood Updated: 06/09/21 0919     Special Requests: No Special Requests        Culture result: No growth 6 days       CULTURE, BLOOD #2 [[id number]] Collected: 06/02/21 1231    Order Status: Completed Specimen: Blood Updated: 06/09/21 0919     Special Requests: No Special Requests        Culture result: No growth 6 days       CULTURE, BLOOD, PAIRED [[id number]] Collected: 06/01/21 2015    Order Status: Completed Specimen: Blood Updated: 06/08/21 0812     Special Requests: No Special Requests        Culture result: No growth 6 days              Labs:     Recent Labs     06/08/21 0621 06/07/21 0659   WBC 6.6 6.6   HGB 11.8* 11.7*   HCT 39.8 38.9    300     Recent Labs     06/08/21 0621 06/07/21 0659    141   K 3.7 3.7    108   CO2 27 28   BUN 20 22*   CREA 1.26 1.19   GLU 68 76   CA 9.2 9.2     Recent Labs     06/08/21 0621 06/07/21 0659   ALT 38 38   AP 71 67   TBILI 0.3 0.3   TP 7.9 7.8   ALB 2.9* 2.9*   GLOB 5.0* 4.9*     No results for input(s): INR, PTP, APTT, INREXT, INREXT in the last 72 hours. No results for input(s): FE, TIBC, PSAT, FERR in the last 72 hours. No results found for: FOL, RBCF   No results for input(s): PH, PCO2, PO2 in the last 72 hours. No results for input(s): CPK, CKNDX, TROIQ in the last 72 hours.     No lab exists for component: CPKMB  No results found for: CHOL, CHOLX, CHLST, CHOLV, HDL, HDLP, LDL, LDLC, DLDLP, TGLX, TRIGL, TRIGP, CHHD, CHHDX  Lab Results   Component Value Date/Time    Glucose (POC) 129 (H) 06/09/2021 11:00 AM    Glucose (POC) 97 06/09/2021 08:28 AM    Glucose (POC) 186 (H) 06/08/2021 07:41 PM    Glucose (POC) 114 06/08/2021 03:14 PM    Glucose (POC) 104 06/08/2021 11:08 AM     No results found for: COLOR, APPRN, SPGRU, REFSG, KENTON, PROTU, GLUCU, KETU, BILU, UROU, DELMY, LEUKU, GLUKE, EPSU, BACTU, WBCU, RBCU, CASTS, UCRY      Assessment:        Acute cellulitis of both legs  Venous stasis ulcer  Chronic hypoxemic respiratory failure  Acute on chronic kidney disease  History of CHF diastolic dysfunction  COPD  Type 2 diabetes  Hypertension  Gout  Arthritis   Sleep Apnea   Morbid obesity  Superficial Thrombosis of left greater saphenous vein   Ejection fraction about 40 to 45%      Plan:     Patient cleared for discharge. Waiting for pacemaker interrogation, which is planned for today. Patient's insurance does not cover outpatient IV antibiotics and he cannot afford out of pocket payments. Consider ciprofloxacin and Augmentin for discharge. Continue Zosyn while in hospital.  Patient is non-compliant with leg elevation. Stressed importance of this. Home health PT, OT, and wound care.      Discussed with the cardiology plan for interrogation of pacemaker today and then patient can be discharged home    Discussed with infectious disease she will going to switch to oral antibiotic as his insurance not covering IV antibiotic at home    And patient is going to see wound care nurse for dressing change before discharge    Discussed with the patient nurse          Current Facility-Administered Medications:     nystatin (MYCOSTATIN) 100,000 unit/gram cream, , Topical, BID, Ivonne Patel MD, Given at 06/09/21 1003    vitamin E acetate capsule 800 Units, 800 Units, Oral, BID, Babak Barton MD, 800 Units at 06/09/21 0951    metFORMIN (GLUCOPHAGE) tablet 1,000 mg, 1,000 mg, Oral, BID WITH MEALS, Babak Barton MD, 1,000 mg at 06/09/21 0950    ammonium lactate (AMLACTIN) 12 % cream, , Topical, PRN, Babak Barton MD, Given at 06/06/21 0951    insulin glargine (LANTUS) injection 32 Units, 32 Units, SubCUTAneous, QHS, Glenn Maurer MD, 32 Units at 06/08/21 2105    insulin glargine (LANTUS) injection 80 Units, 80 Units, SubCUTAneous, DAILY, Darren Maurer MD, 80 Units at 06/09/21 0954    Saccharomyces boulardii (FLORASTOR) capsule 250 mg, 250 mg, Oral, BID, Moni Patel MD, 250 mg at 06/09/21 0951    colchicine tablet 0.6 mg, 0.6 mg, Oral, Kendra Prescott MD, 0.6 mg at 06/09/21 0951    piperacillin-tazobactam (ZOSYN) 3.375 g in 0.9% sodium chloride (MBP/ADV) 100 mL MBP, 3.375 g, IntraVENous, Q8H, Moni Patel MD, Last Rate: 25 mL/hr at 06/09/21 0614, 3.375 g at 06/09/21 2980    glucose chewable tablet 16 g, 4 Tablet, Oral, PRN, Moni Patel MD    dextrose (D50W) injection syrg 12.5-25 g, 25-50 mL, IntraVENous, PRN, Tiffany Patel MD    glucagon (GLUCAGEN) injection 1 mg, 1 mg, IntraMUSCular, PRN, Tiffany Patel MD    albuterol (PROVENTIL HFA, VENTOLIN HFA, PROAIR HFA) inhaler 2 Puff, 2 Puff, Inhalation, Q6H PRN, Moni Patel MD    ascorbic acid (vitamin C) (VITAMIN C) tablet 1,000 mg, 1,000 mg, Oral, BID, Moni Patel MD, 1,000 mg at 06/09/21 4678    aspirin chewable tablet 81 mg, 81 mg, Oral, DAILY, Moni Patel MD, 81 mg at 06/09/21 5300    ferrous sulfate tablet 325 mg, 1 Tablet, Oral, ACB, Moni Patel MD, 325 mg at 06/09/21 0950    fluticasone propionate (FLONASE) 50 mcg/actuation nasal spray 2 Spray, 2 Spray, Both Nostrils, DAILY, Moni Patel MD, 2 Bitely at 06/09/21 1002    gabapentin (NEURONTIN) capsule 300 mg, 300 mg, Oral, QID, Moni Patel MD, 300 mg at 06/09/21 3693    multivitamin (ONE A DAY) tablet 1 Tablet, 1 Tablet, Oral, DAILY, Moni Patel MD, 1 Tablet at 06/09/21 0950    tamsulosin (FLOMAX) capsule 0.4 mg, 0.4 mg, Oral, DAILY, Moni Patel MD, 0.4 mg at 06/09/21 0950    tiotropium bromide (SPIRIVA RESPIMAT) 2.5 mcg /actuation, 5 mcg, Inhalation, DAILY, Moni Patel MD, 2 Puff at 06/09/21 0727    insulin lispro (HUMALOG) injection, , SubCUTAneous, AC&HS, Moni Patel MD, 3 Units at 06/06/21 1324    acetaminophen (TYLENOL) tablet 650 mg, 650 mg, Oral, Q6H PRN **OR** acetaminophen (TYLENOL) suppository 650 mg, 650 mg, Rectal, Q6H PRN, Moni Patel MD    polyethylene glycol (MIRALAX) packet 17 g, 17 g, Oral, DAILY PRN, Juliana Grady MD  [place] promethazine (PHENERGAN) tablet 12.5 mg, 12.5 mg, Oral, Q6H PRN **OR** ondansetron (ZOFRAN) injection 4 mg, 4 mg, IntraVENous, Q6H PRN, Moni Patel MD    oxyCODONE IR (ROXICODONE) tablet 10 mg, 10 mg, Oral, Q6H PRN, Moni Patel MD, 10 mg at 06/09/21 0951    bumetanide (BUMEX) injection 1 mg, 1 mg, IntraVENous, BID, Kari Spann MD, 1 mg at 06/09/21 9464

## 2021-06-09 NOTE — DISCHARGE INSTRUCTIONS

## 2021-06-09 NOTE — PROGRESS NOTES
PICC ordered. Reviewed chart. Per Dr. Minnie Land note, possible plan to d/c on PO abx. Primary RN aware.  Please contact PICC team if PICC line is still needed.  -Michael Duarte RN BSN [facility]

## 2021-06-09 NOTE — PROGRESS NOTES
General Daily Progress Note          Patient Name:   Mitch Palm       YOB: 1955       Age:  76 y.o. Admit Date: 6/1/2021      Subjective:     Patient is V 22 y.o. year old male with a history of COPD, diabetes, gout, hypertension, arthritis, and sleep apnea. Patient has venous ulcers on his lower legs bilaterally that were being treated by wound care for several months with no improvement. Wound care recommended admission to the hospital for multidisplinary management of his venous ulcers. He was seen in the ER 6/1 and admitted. Patient is alert, awake, and in no acute distress. He is still complaining of leg pain and would like to increase his pain meds. Denies chest pain, abdominal pain, or SOB. Objective:     Visit Vitals  /80 (BP 1 Location: Right lower arm, BP Patient Position: Lying left side)   Pulse 80   Temp 97.9 °F (36.6 °C)   Resp 20   Ht 5' 11\" (1.803 m)   Wt (!) 169.1 kg (372 lb 14.4 oz)   SpO2 100%   BMI 52.01 kg/m²        Recent Results (from the past 24 hour(s))   GLUCOSE, POC    Collection Time: 06/08/21 11:08 AM   Result Value Ref Range    Glucose (POC) 104 65 - 117 mg/dL    Performed by Marcela DUMAS [facility], POC    Collection Time: 06/08/21  3:14 PM   Result Value Ref Range    Glucose (POC) 114 65 - 117 mg/dL    Performed by Kale Oswald, POC    Collection Time: 06/08/21  7:41 PM   Result Value Ref Range    Glucose (POC) 186 (H) 65 - 117 mg/dL    Performed by Lara Anaya    GLUCOSE, POC    Collection Time: 06/09/21  8:28 AM   Result Value Ref Range    Glucose (POC) 97 65 - 117 mg/dL    Performed by Sully Axon (PCT)      [unfilled]      Review of Systems    Constitutional: Negative for chills and fever. HENT: Negative. Eyes: Negative. Respiratory: Negative. Cardiovascular: Negative. Gastrointestinal: Negative for abdominal pain and nausea. Skin: Negative. Neurological: Negative.         Physical Exam:      Constitutional: pt is oriented to person, place, and time. HENT:   Head: Normocephalic and atraumatic. Eyes: Pupils are equal, round, and reactive to light. EOM are normal.   Cardiovascular: Normal rate, regular rhythm and normal heart sounds. Pulmonary/Chest: Breath sounds normal. No wheezes. No rales. Exhibits no tenderness. Abdominal: Soft. Bowel sounds are normal. There is no abdominal tenderness. There is no rebound and no guarding. Musculoskeletal: Normal range of motion. Neurological: pt is alert and oriented to person, place, and time. Extremities: Venous ulcers R and L lateral lower legs with surrounding discoloration of lower extremities, currently covered with wound dressings    XR CHEST PORT   Final Result   Stable mild-to-moderate enlargement of cardiomediastinal silhouette. Mediastinum underpenetrated with suboptimal visualization of right atrial and   right ventricular pacing leads. There is obscuration of a portion of the left   hemithorax by the generator device. No pneumothorax. No evidence of pulmonary   edema, air space pneumonia, or pleural effusion.            Recent Results (from the past 24 hour(s))   GLUCOSE, POC    Collection Time: 06/08/21 11:08 AM   Result Value Ref Range    Glucose (POC) 104 65 - 117 mg/dL    Performed by Marcela DUMAS [address] [place] [address], POC    Collection Time: 06/08/21  3:14 PM   Result Value Ref Range    Glucose (POC) 114 65 - 117 mg/dL    Performed by Elsie Francis, POC    Collection Time: 06/08/21  7:41 PM   Result Value Ref Range    Glucose (POC) 186 (H) 65 - 117 mg/dL    Performed by Valeri Natarajan    GLUCOSE, POC    Collection Time: 06/09/21  8:28 AM   Result Value Ref Range    Glucose (POC) 97 65 - 117 mg/dL    Performed by Mick Jim (PCT)        Results     Procedure Component Value Units Date/Time    CULTURE, BLOOD #1 [[id number]] Collected: 06/02/21 1231    Order Status: Completed Specimen: Blood Updated: 06/08/21 9890 Special Requests: No Special Requests        Culture result: No growth 5 days       CULTURE, BLOOD #2 [[id number]] Collected: 06/02/21 1231    Order Status: Completed Specimen: Blood Updated: 06/08/21 0812     Special Requests: No Special Requests        Culture result: No growth 5 days       CULTURE, BLOOD, PAIRED [[id number]] Collected: 06/01/21 2015    Order Status: Completed Specimen: Blood Updated: 06/08/21 0812     Special Requests: No Special Requests        Culture result: No growth 6 days              Labs:     Recent Labs     06/08/21 0621 06/07/21 0659   WBC 6.6 6.6   HGB 11.8* 11.7*   HCT 39.8 38.9    300     Recent Labs     06/08/21 0621 06/07/21 0659 06/06/21  1111    141 139   K 3.7 3.7 4.1    108 106   CO2 27 28 28   BUN 20 22* 26*   CREA 1.26 1.19 1.34*   GLU 68 76 157*   CA 9.2 9.2 9.1     Recent Labs     06/08/21 0621 06/07/21 0659 06/06/21  1111   ALT 38 38 42   AP 71 67 73   TBILI 0.3 0.3 0.4   TP 7.9 7.8 8.0   ALB 2.9* 2.9* 3.0*   GLOB 5.0* 4.9* 5.0*     No results for input(s): INR, PTP, APTT, INREXT in the last 72 hours. No results for input(s): FE, TIBC, PSAT, FERR in the last 72 hours. No results found for: FOL, RBCF   No results for input(s): PH, PCO2, PO2 in the last 72 hours. No results for input(s): CPK, CKNDX, TROIQ in the last 72 hours.     No lab exists for component: CPKMB  No results found for: CHOL, CHOLX, CHLST, CHOLV, HDL, HDLP, LDL, LDLC, DLDLP, TGLX, TRIGL, TRIGP, CHHD, CHHDX  Lab Results   Component Value Date/Time    Glucose (POC) 97 06/09/2021 08:28 AM    Glucose (POC) 186 (H) 06/08/2021 07:41 PM    Glucose (POC) 114 06/08/2021 03:14 PM    Glucose (POC) 104 06/08/2021 11:08 AM    Glucose (POC) 75 06/08/2021 07:35 AM     No results found for: COLOR, APPRN, SPGRU, REFSG, KENTON, PROTU, GLUCU, KETU, BILU, UROU, DELMY, LEUKU, GLUKE, EPSU, BACTU, WBCU, RBCU, CASTS, UCRY      Assessment:        Acute cellulitis of both legs  Venous stasis ulcer  Chronic hypoxemic respiratory failure  Acute on chronic kidney disease  History of CHF diastolic dysfunction  COPD  Type 2 diabetes  Hypertension  Gout  Arthritis   Sleep Apnea   Morbid obesity  Superficial Thrombosis of left greater saphenous vein   Ejection fraction about 40 to 45%      Plan:     Patient cleared for discharge. Waiting for pacemaker interrogation, which is planned for today. Patient's insurance does not cover outpatient IV antibiotics and he cannot afford out of pocket payments. Consider ciprofloxacin and Augmentin for discharge. Continue Zosyn while in hospital.  Patient is non-compliant with leg elevation. Stressed importance of this. Home health PT, OT, and wound care.            Current Facility-Administered Medications:     nystatin (MYCOSTATIN) 100,000 unit/gram cream, , Topical, BID, Moni Patel MD, Given at 06/08/21 2112    vitamin E acetate capsule 800 Units, 800 Units, Oral, BID, Moni Patel MD, 800 Units at 06/08/21 2106    metFORMIN (GLUCOPHAGE) tablet 1,000 mg, 1,000 mg, Oral, BID WITH MEALS, Moni Patel MD, 1,000 mg at 06/08/21 1816    ammonium lactate (AMLACTIN) 12 % cream, , Topical, PRN, Kathi Patel MD, Given at 06/06/21 0951    insulin glargine (LANTUS) injection 32 Units, 32 Units, SubCUTAneous, QHS, Darren Maurer MD, 32 Units at 06/08/21 2105    insulin glargine (LANTUS) injection 80 Units, 80 Units, SubCUTAneous, DAILY, Darren Maurer MD, 80 Units at 06/08/21 0859    Saccharomyces boulardii (FLORASTOR) capsule 250 mg, 250 mg, Oral, BID, Moni Patel MD, 250 mg at 06/08/21 2106    colchicine tablet 0.6 mg, 0.6 mg, Oral, DAILY, Samson, Patricia Foster MD, 0.6 mg at 06/08/21 0859    piperacillin-tazobactam (ZOSYN) 3.375 g in 0.9% sodium chloride (MBP/ADV) 100 mL MBP, 3.375 g, IntraVENous, Q8H, Moni Patel MD, Last Rate: 25 mL/hr at 06/09/21 0614, 3.375 g at 06/09/21 0614    glucose chewable tablet 16 g, 4 Tablet, Oral, PRN, Amanda, Bryan Linares MD    dextrose (D50W) injection syrg 12.5-25 g, 25-50 mL, IntraVENous, PRN, Bryan Patel MD    glucagon [facility]) injection 1 mg, 1 mg, IntraMUSCular, PRN, Bryan Patel MD    albuterol (PROVENTIL HFA, VENTOLIN HFA, PROAIR HFA) inhaler 2 Puff, 2 Puff, Inhalation, Q6H PRN, Bryan Patel MD    ascorbic acid (vitamin C) (VITAMIN C) tablet 1,000 mg, 1,000 mg, Oral, BID, Moni Patel MD, 1,000 mg at 06/08/21 2106    aspirin chewable tablet 81 mg, 81 mg, Oral, DAILY, Moni Patel MD, 81 mg at 06/08/21 7259    ferrous sulfate tablet 325 mg, 1 Tablet, Oral, ACB, Moni Patel MD, 325 mg at 06/08/21 0858    fluticasone propionate (FLONASE) 50 mcg/actuation nasal spray 2 Spray, 2 Spray, Both Nostrils, DAILY, Moni Patel MD, 2 [place] at 06/08/21 0900    gabapentin (NEURONTIN) capsule 300 mg, 300 mg, Oral, QID, Moni Patel MD, 300 mg at 06/08/21 2106    multivitamin (ONE A DAY) tablet 1 Tablet, 1 Tablet, Oral, DAILY, Moni Patel MD, 1 Tablet at 06/08/21 0858    tamsulosin (FLOMAX) capsule 0.4 mg, 0.4 mg, Oral, DAILY, Moni Patel MD, 0.4 mg at 06/08/21 0858    tiotropium bromide (SPIRIVA RESPIMAT) 2.5 mcg /actuation, 5 mcg, Inhalation, DAILY, Moni Patel MD, 2 Puff at 06/09/21 0727    insulin lispro (HUMALOG) injection, , SubCUTAneous, AC&HS, Moni Patel MD, 3 Units at 06/06/21 1324    acetaminophen (TYLENOL) tablet 650 mg, 650 mg, Oral, Q6H PRN **OR** acetaminophen (TYLENOL) suppository 650 mg, 650 mg, Rectal, Q6H PRN, Bryan Patel MD    polyethylene glycol (MIRALAX) packet 17 g, 17 g, Oral, DAILY PRN, Bryan Patel MD    promethazine (PHENERGAN) tablet 12.5 mg, 12.5 mg, Oral, Q6H PRN **OR** ondansetron (ZOFRAN) injection 4 mg, 4 mg, IntraVENous, Q6H PRN, Moni Patel MD    oxyCODONE IR (ROXICODONE) tablet 10 mg, 10 mg, Oral, Q6H PRN, Moni Patel MD, 10 mg at 06/09/21 0338    bumetanide (BUMEX) injection 1 mg, 1 mg, IntraVENous, BID, Kari Spann MD, 1 mg at 06/08/21 5678

## 2021-06-09 NOTE — PROGRESS NOTES
PROGRESS NOTE      Chief Complaints:  Patient has no complaints. HPI and  Objective:    I examined this patient this morning. Patient was comfortable on BiPAP. Patient has a modified compression wraps bilateral leg. No fever chills no chest pain shortness breath. Review of Systems:  Otherwise rest of review of system negative  EXAM:  Visit Vitals  /80 (BP 1 Location: Right lower arm, BP Patient Position: Lying left side)   Pulse 80   Temp 97.9 °F (36.6 °C)   Resp 20   Ht 5' 11\" (1.803 m)   Wt (!) 372 lb 14.4 oz (169.1 kg)   SpO2 100%   BMI 52.01 kg/m²     Patient is awake and alert  Head and neck atraumatic  Cardiovascular system regular rate  Pulmonary no audible wheeze  Abdomen not distended. Neurological nonfocal.  Dressings intact both legs. Recent Results (from the past 24 hour(s))   GLUCOSE, POC    Collection Time: 06/08/21  7:41 PM   Result Value Ref Range    Glucose (POC) 186 (H) 65 - 117 mg/dL    Performed by Freddy Early    GLUCOSE, POC    Collection Time: 06/09/21  8:28 AM   Result Value Ref Range    Glucose (POC) 97 65 - 117 mg/dL    Performed by Nadia Thompson (PCT)    GLUCOSE, POC    Collection Time: 06/09/21 11:00 AM   Result Value Ref Range    Glucose (POC) 129 (H) 65 - 117 mg/dL    Performed by Nadia Thompson (PCT)    GLUCOSE, POC    Collection Time: 06/09/21  3:34 PM   Result Value Ref Range    Glucose (POC) 151 (H) 65 - 117 mg/dL    Performed by Simran Dunham        ASSESSMENT:   Patient is 76 y.o. with diagnosis of : Active Problems:    Cellulitis (6/1/2021)      Cellulitis and abscess of right leg (6/2/2021)        PLAN:                   Patient is going home later today. Profore compression wraps will be applied in my office on Monday. Please make an appointment to come see Dr. Shelli Reina this coming Monday for wound care and dressing changes.

## 2021-06-09 NOTE — PROGRESS NOTES
Report given to Mejia Harrison RN oncoming nurse to include sbar, mar, kardex, recent orders and changes.

## 2021-06-09 NOTE — PROGRESS NOTES
Pt accepted with [facility]. Discharge order uploaded via [facility]. Alaina provided pt with teaching. Discharge plan of care/case management plan validated with provider's discharge order.

## 2021-06-09 NOTE — WOUND CARE
I spoke to Dr. Isidra Cruz and informed him that patient may discharge today now that PICC line is in place. He wants a follow up appointment scheduled for him on Monday 6/14/21. I spoke with Irene Cote and requested she get the  to schedule before patient discharges. Wound care / dressing orders are in place for BLEs.

## 2021-06-09 NOTE — PROGRESS NOTES
PT treatment attempted at [phone] however, PICC line placement being performed. Will continue to follow pt and will attempt treatment at a later time.  Thank you

## 2021-06-09 NOTE — PROGRESS NOTES
Progress Note    Patient: Deepali Del Real MRN: [id number]  SSN: [id number]    YOB: 1955  Age: [place][age] y.o. Sex: male      Admit Date: 6/1/2021    LOS: 7 days     Subjective:   No acute events overnight    Objective:     Vitals:    06/08/21 1942 06/09/21 0119 06/09/21 0727 06/09/21 0833   BP: (!) 153/78 130/67  125/80   Pulse: 97 85  80   Resp: 20 20  20   Temp: 98.1 °F (36.7 °C) 98.1 °F (36.7 °C)  97.9 °F (36.6 °C)   SpO2: 90% 98% 93% 100%   Weight:  (!) 169.1 kg (372 lb 14.4 oz)     Height:            Intake and Output:  Current Shift: 06/09 0701 - 06/09 1900  In: -   Out: 400 [Urine:400]  Last three shifts: 06/07 1901 - 06/09 0700  In: -   Out: 2000 [Urine:2000]    Physical Exam:   General:  Alert, cooperative, no distress, appears stated age. Eyes:  Conjunctivae/corneas clear. PERRL, EOMs intact. Fundi benign   Ears:  Normal TMs and external ear canals both ears. Nose: Nares normal. Septum midline. Mucosa normal. No drainage or sinus tenderness. Mouth/Throat: Lips, mucosa, and tongue normal. Teeth and gums normal.   Neck: Supple, symmetrical, trachea midline, no adenopathy, thyroid: no enlargment/tenderness/nodules, no carotid bruit and no JVD. Back:   Symmetric, no curvature. ROM normal. No CVA tenderness. Lungs:   Clear to auscultation bilaterally. Heart:  Regular rate and rhythm, S1, S2 normal, no murmur, click, rub or gallop. Abdomen:   Soft, non-tender. Bowel sounds normal. No masses,  No organomegaly. Extremities: Extremities normal, atraumatic, no cyanosis or edema. Pulses: 2+ and symmetric all extremities. Skin: Skin color, texture, turgor normal. No rashes or lesions   Lymph nodes: Cervical, supraclavicular, and axillary nodes normal.   Neurologic: CNII-XII intact. Normal strength, sensation and reflexes throughout. Lab/Data Review: All lab results for the last 24 hours reviewed.          Assessment:     Active Problems:    Cellulitis (6/1/2021) Cellulitis and abscess of right leg (6/2/2021)    Mr. Elsy Al is a 21-year-old white male with:  1. Heart failure with decompensation. 2.  Morbid obesity. 3.  Hypertension with hypertensive heart disease. 4.  Mixed hyperlipidemia  5. Diabetes mellitus with neuropathy. 6.  Venous insufficiency, status post ablation( Dr. Starlet Lundborg)  7. Obstructive sleep apnea, on CPAP machine. 8.  COPD  9. Gout. 10. SSS s/p DCPM 9/2019  11. Off BB due to easy fatigability. Plan:     Kidney function yesterday was stable. I talked to the nurse regarding interrogating the device. Still having lower extremity swelling. Currently with paced rhythm. Currently on IV Bumex twice a day, colchicine. Blood pressure is acceptable.   He is compliant with CPAP machine      Signed By: Evelio Ling MD     June 9, 2021

## 2021-06-10 ENCOUNTER — TELEPHONE (OUTPATIENT)
Dept: SURGERY | Age: 66
End: 2021-06-10

## 2021-06-10 NOTE — TELEPHONE ENCOUNTER
Returned [facility]. She is at pt's home now. She says orders say wet to dry until sees Dr. Darlene Dickinson. But pt has xeroform gauze, 4x4 gauze then kerlix with ace wraps on his leg wounds now. He says his dressings have not been changed in 3 days in hospital.  I informed nurse Pal Aguilar that Dr. Miriam Schwab note says elevate, keep swelling down and he will do Profore dressings in office on Monday 06/14/21 if swelling is down. She says she will change dressings today and do what was on the wounds>xeroform, 4x4, kerlix and ace< until pt sees Dr. Miriam Schwab in office Monday-06/14/21. Thank you.

## 2021-06-10 NOTE — TELEPHONE ENCOUNTER
Laura Lew from  [facility] called stated patient was discharged on 06/09/2021 and needs clarification on wound care orders from Dr Isidra Cruz.  Please call Laura Lew [phone]

## 2021-06-10 NOTE — PROGRESS NOTES
PATIENT DISCHARGE:::    Patient discharge per orders. Tele removed. IV removed. D/C with PICC line for long term abx. Medications administered prior to D/c non-narcotics. Patient education and discharge paperwork with patient and all belongings. Taken to exit via wheelchair in stable condition by primary nurse. Taken to private residence by private vehicle.

## 2021-06-14 ENCOUNTER — OFFICE VISIT (OUTPATIENT)
Dept: SURGERY | Age: 66
End: 2021-06-14
Payer: MEDICARE

## 2021-06-14 VITALS
BODY MASS INDEX: 52 KG/M2 | HEART RATE: 100 BPM | WEIGHT: 315 LBS | OXYGEN SATURATION: 96 % | TEMPERATURE: 97.8 F | DIASTOLIC BLOOD PRESSURE: 73 MMHG | SYSTOLIC BLOOD PRESSURE: 114 MMHG | RESPIRATION RATE: 20 BRPM

## 2021-06-14 DIAGNOSIS — L97.909 VENOUS ULCER (HCC): ICD-10-CM

## 2021-06-14 DIAGNOSIS — L97.929 IDIOPATHIC CHRONIC VENOUS HYPERTENSION OF LEFT LOWER EXTREMITY WITH ULCER (HCC): Primary | ICD-10-CM

## 2021-06-14 DIAGNOSIS — I87.312 IDIOPATHIC CHRONIC VENOUS HYPERTENSION OF LEFT LOWER EXTREMITY WITH ULCER (HCC): Primary | ICD-10-CM

## 2021-06-14 DIAGNOSIS — I83.009 VENOUS ULCER (HCC): ICD-10-CM

## 2021-06-14 PROCEDURE — G8536 NO DOC ELDER MAL SCRN: HCPCS | Performed by: SURGERY

## 2021-06-14 PROCEDURE — G8427 DOCREV CUR MEDS BY ELIG CLIN: HCPCS | Performed by: SURGERY

## 2021-06-14 PROCEDURE — 99213 OFFICE O/P EST LOW 20 MIN: CPT | Performed by: SURGERY

## 2021-06-14 PROCEDURE — G8417 CALC BMI ABV UP PARAM F/U: HCPCS | Performed by: SURGERY

## 2021-06-14 PROCEDURE — 1101F PT FALLS ASSESS-DOCD LE1/YR: CPT | Performed by: SURGERY

## 2021-06-14 PROCEDURE — 3017F COLORECTAL CA SCREEN DOC REV: CPT | Performed by: SURGERY

## 2021-06-14 PROCEDURE — 1111F DSCHRG MED/CURRENT MED MERGE: CPT | Performed by: SURGERY

## 2021-06-14 PROCEDURE — G8510 SCR DEP NEG, NO PLAN REQD: HCPCS | Performed by: SURGERY

## 2021-06-14 PROCEDURE — 29581 APPL MULTLAYER CMPRN SYS LEG: CPT | Performed by: SURGERY

## 2021-06-14 RX ORDER — CLONIDINE HYDROCHLORIDE 0.1 MG/1
1 TABLET ORAL EVERY 12 HOURS
COMMUNITY
Start: 2021-05-27 | End: 2022-07-14

## 2021-06-14 NOTE — PROGRESS NOTES
Chief Complaint   Patient presents with    Follow-up     ED visit    Wound Check     BLE     1. Have you been to the ER, urgent care clinic since your last visit? Hospitalized since your last visit? No    2. Have you seen or consulted any other health care providers outside of the [address] [place] [address] since your last visit? Include any pap smears or colon screening.  No     Visit Vitals  /73 (BP 1 Location: Left upper arm, BP Patient Position: Sitting, BP Cuff Size: Adult)   Pulse 100   Temp 97.8 °F (36.6 °C) (Temporal)   Resp 20   Wt (!) 372 lb 12.8 oz (169.1 kg)   SpO2 96%   BMI 52.00 kg/m²

## 2021-06-17 PROBLEM — I83.009 VENOUS ULCER (HCC): Status: ACTIVE | Noted: 2021-06-17

## 2021-06-17 PROBLEM — L97.909 VENOUS ULCER (HCC): Status: ACTIVE | Noted: 2021-06-17

## 2021-06-17 NOTE — PROGRESS NOTES
VASCULAR FOLLOW UP      Subjective:   CHIEF COMPLAINTS:  Venous stasis ulcer  PRESENTATION OF ILLNESS:  Mr. Jose Staples was recently hospitalized for complication from venous stasis ulcers of both legs. Patient is here today for application of the Profore dressing changes. Past Medical History:   Diagnosis Date    Arthritis     [facility] obstructive pulmonary disease ([facility]     Diabetes ([facility]     Gout     Hypertension     Ill-defined condition     Sleep apnea       Past Surgical History:   Procedure Laterality Date    HX ORTHOPAEDIC      HX VEIN ABLATION ADHESIVE       Family History   Problem Relation Age of Onset    Heart Disease Mother     Kidney Disease Mother     Hypertension Mother     Diabetes Mother     Heart Disease Father     Hypertension Father     Diabetes Sister     Diabetes Brother       Social History     Tobacco Use    Smoking status: Never Smoker    Smokeless tobacco: Never Used   Substance Use Topics    Alcohol use: Yes       Prior to Admission medications    Medication Sig Start Date End Date Taking? Authorizing Provider   ammonium lactate (AMLACTIN) 12 % topical cream Apply  to affected area as needed for Other (xerosis). rub in to affected area well  Indications: dry skin 6/8/21  Yes Jenaro Patel MD   vitamin E, dl,tocopheryl acet, (vitamin E acetate) 400 unit capsule Take 2 Capsules by mouth two (2) times a day. 6/8/21  Yes Hussain Flores MD   piperacillin-tazobactam 3.375 gram 3.375 g IVPB 3.375 g by IntraVENous route every eight (8) hours for 14 days. 6/7/21 6/21/21 Yes Juliann Cristobal MD   ascorbic acid, vitamin C, (Vitamin C) 500 mg tablet Take 1,000 mg by mouth two (2) times a day. Indications: inadequate vitamin C   Yes Provider, Historical   tamsulosin (Flomax) 0.4 mg capsule Take 0.4 mg by mouth daily. Yes Provider, Historical   tiotropium (Spiriva with HandiHaler) 18 mcg inhalation capsule Take 1 Capsule by inhalation daily.    Yes Provider, Historical bumetanide (BUMEX) 2 mg tablet Take 2 mg by mouth two (2) times a day. Yes Provider, Historical   vitamin e (E GEMS) 1,000 unit capsule Take 1,000 Units by mouth two (2) times a day. Yes Provider, Historical   ferrous sulfate (Iron) 325 mg (65 mg iron) tablet Take  by mouth Daily (before breakfast). Yes Provider, Historical   aspirin 81 mg chewable tablet Take 81 mg by mouth daily. Yes Provider, Historical   flunisolide (NASAREL) 25 mcg (0.025 %) spry 2 Sprays two (2) times a day. Yes Provider, Historical   albuterol (ProAir HFA) 90 mcg/actuation inhaler Take  by inhalation. Yes Provider, Historical   colchicine 0.6 mg tablet Take 0.6 mg by mouth daily. Yes Provider, Historical   gabapentin (NEURONTIN) 300 mg capsule Take 300 mg by mouth four (4) times daily. Yes Provider, Historical   oxyCODONE IR (ROXICODONE) 5 mg immediate release tablet Take 5 mg by mouth every twelve (12) hours. Yes Provider, Historical   metFORMIN (GLUCOPHAGE) 500 mg tablet Take 1,000 mg by mouth two (2) times daily (with meals). Yes Provider, Historical   insulin lispro (HumaLOG U-100 Insulin) 100 unit/mL injection by SubCUTAneous route. Sliding scale   Yes Provider, Historical   cloNIDine HCL (CATAPRES) 0.1 mg tablet Take 1 Tablet by mouth every twelve (12) hours. 5/27/21   Provider, Historical   multivitamin (ONE A DAY) tablet Take 1 Tab by mouth daily. Patient not taking: Reported on 6/14/2021    Provider, Historical   insulin glargine (LANTUS) 100 unit/mL injection by SubCUTAneous route nightly.  80units am/ 20units pm  Patient not taking: Reported on 6/14/2021    Provider, Historical     Allergies   Allergen Reactions    Elavil Unknown (comments)    Naproxen Other (comments)     Pt denies    Sulfa (Sulfonamide Antibiotics) Nausea and Vomiting        Review of Systems:  I reviewed the rest of organ systems personally and they were negative signed by Dr. Tara Parish    Objective:     Visit Vitals  /73 (BP 1 Location: Left upper arm, BP Patient Position: Sitting, BP Cuff Size: Adult)   Pulse 100   Temp 97.8 °F (36.6 °C) (Temporal)   Resp 20   Wt (!) 372 lb 12.8 oz (169.1 kg)   SpO2 96%   BMI 52.00 kg/m²     VITAL SIGNS REVIEWED. Physical Exam:  Patient is well-nourished pleasant in conversation is appropriate. Head and neck examination atraumatic, normocephalic. Gaze appropriate. Conversation appropriate. Neck examination shows supple. No mass. No obvious carotid bruit. Chest examination shows lungs are clear bilaterally well-expanded, no crackles or wheezes. Cardiovascular system regular rate, no obvious murmur. Skin warm to touch  and moist, no skin lesions. Abdomen is soft ,not tender or distended bowel sounds present. No palpable mass. Neurological examinations, no focal neuro deficits moving all 4 extremities. Cranial nerves intact. Sensation is intact as well. Hematologic: No obvious bruise or swelling or obvious lymphadenopathy. Psychosocial: Appropriate. Has good effect. Musculoskeletal system: No muscle wasting, appropriate movements upper and lower extremity. Vascular examination: I applied a Profore dressing with Aquacel Ag in both legs. Patient tolerated procedure well. Data Review:   No results displayed because visit has over 200 results. Assessment:     Problem List Items Addressed This Visit        Circulatory    Idiopathic chronic venous hypertension of left lower extremity with ulcer ([address] - Primary       Integumentary    Venous ulcer ([address]              Plan:     Patient will return next week for dressing changes. Patient is also arranged for home nurse to help with the dressing changes as well.         Elmer Vasquez MD

## 2021-06-18 ENCOUNTER — TELEPHONE (OUTPATIENT)
Dept: SURGERY | Age: 66
End: 2021-06-18

## 2021-06-18 NOTE — TELEPHONE ENCOUNTER
Called to verify that pt was still getting wound care and Darian Burciaga from [facility] verified that pt was getting profore dressing changes twice weekly.

## 2021-06-28 ENCOUNTER — TELEPHONE (OUTPATIENT)
Dept: INFECTIOUS DISEASES | Age: 66
End: 2021-06-28

## 2021-06-28 NOTE — TELEPHONE ENCOUNTER
Phone call to Flakito Galaviz at [facility].  Ok to discontinue picc line per Dr Jessy Hussein.

## 2021-06-28 NOTE — TELEPHONE ENCOUNTER
Phone call from Bhaskar at [facility]. Patient's EOT was 6/22. Need further orders re: picc line. Last labs 6/21. I can call her back with orders . Thanks.

## 2021-07-08 ENCOUNTER — TELEPHONE (OUTPATIENT)
Dept: UROLOGY | Age: 66
End: 2021-07-08

## 2021-07-15 ENCOUNTER — OFFICE VISIT (OUTPATIENT)
Dept: SURGERY | Age: 66
End: 2021-07-15
Payer: MEDICARE

## 2021-07-15 VITALS
BODY MASS INDEX: 51.86 KG/M2 | TEMPERATURE: 98.3 F | OXYGEN SATURATION: 94 % | DIASTOLIC BLOOD PRESSURE: 85 MMHG | RESPIRATION RATE: 20 BRPM | WEIGHT: 315 LBS | HEART RATE: 103 BPM | SYSTOLIC BLOOD PRESSURE: 129 MMHG

## 2021-07-15 DIAGNOSIS — L97.909 VENOUS ULCER (HCC): ICD-10-CM

## 2021-07-15 DIAGNOSIS — L97.929 IDIOPATHIC CHRONIC VENOUS HYPERTENSION OF LEFT LOWER EXTREMITY WITH ULCER (HCC): Primary | ICD-10-CM

## 2021-07-15 DIAGNOSIS — I83.009 VENOUS ULCER (HCC): ICD-10-CM

## 2021-07-15 DIAGNOSIS — I87.312 IDIOPATHIC CHRONIC VENOUS HYPERTENSION OF LEFT LOWER EXTREMITY WITH ULCER (HCC): Primary | ICD-10-CM

## 2021-07-15 DIAGNOSIS — R60.0 LOCALIZED EDEMA: ICD-10-CM

## 2021-07-15 PROCEDURE — G8536 NO DOC ELDER MAL SCRN: HCPCS | Performed by: SURGERY

## 2021-07-15 PROCEDURE — 29581 APPL MULTLAYER CMPRN SYS LEG: CPT | Performed by: SURGERY

## 2021-07-15 PROCEDURE — 99213 OFFICE O/P EST LOW 20 MIN: CPT | Performed by: SURGERY

## 2021-07-15 PROCEDURE — 1101F PT FALLS ASSESS-DOCD LE1/YR: CPT | Performed by: SURGERY

## 2021-07-15 PROCEDURE — 3017F COLORECTAL CA SCREEN DOC REV: CPT | Performed by: SURGERY

## 2021-07-15 PROCEDURE — G8427 DOCREV CUR MEDS BY ELIG CLIN: HCPCS | Performed by: SURGERY

## 2021-07-15 PROCEDURE — G8510 SCR DEP NEG, NO PLAN REQD: HCPCS | Performed by: SURGERY

## 2021-07-15 PROCEDURE — G8417 CALC BMI ABV UP PARAM F/U: HCPCS | Performed by: SURGERY

## 2021-07-15 NOTE — PROGRESS NOTES
Identified pt with two pt identifiers(name and ). Reviewed record in preparation for visit and have obtained necessary documentation. Chief Complaint   Patient presents with    Wound Check     BLE      Vitals:    07/15/21 1136   BP: 129/85   Pulse: (!) 103   Resp: 20   Temp: 98.3 °F (36.8 °C)   TempSrc: Temporal   SpO2: 94%   Weight: (!) 371 lb 12.8 oz (168.6 kg)   PainSc:   8       Health Maintenance Review: Patient reminded of \"due or due soon\" health maintenance. I have asked the patient to contact his/her primary care provider (PCP) for follow-up on his/her health maintenance. Coordination of Care Questionnaire:  :   1) Have you been to an emergency room, urgent care, or hospitalized since your last visit? If yes, where when, and reason for visit? No      2. Have seen or consulted any other health care provider since your last visit? If yes, where when, and reason for visit? No      ADL Assessment 7/15/2021   Feeding yourself No Help Needed   Getting from bed to chair Help Needed   Getting dressed Help Needed   Bathing or showering Help Needed   Walk across the room (includes cane/walker) No Help Needed   Using the telphone No Help Needed   Taking your medications No Help Needed   Preparing meals Help Needed   Managing money (expenses/bills) No Help Needed   Moderately strenuous housework (laundry) Help Needed   Shopping for personal items (toiletries/medicines) Help Needed   Shopping for groceries Help Needed   Driving No Help Needed   Climbing a flight of stairs Help Needed   Getting to places beyond walking distances Help Needed     Abuse Screening Questionnaire 7/15/2021   Do you ever feel afraid of your partner? N   Are you in a relationship with someone who physically or mentally threatens you? N   Is it safe for you to go home? Y     Who is the primary learner? Patient    What is the preferred language for health care of the primary learner?  ENGLISH    How does the primary learner prefer to learn new concepts?  LISTENING    Answered By Patient    Relationship to Learner SELF

## 2021-08-01 NOTE — PROGRESS NOTES
VASCULAR FOLLOW UP      Subjective:   CHIEF COMPLAINTS:  Venous stasis ulcer  PRESENTATION OF ILLNESS:  Patient is here today dressing changes both leg venous stasis ulcer. I applied Profore dressing last time. Patient is doing well otherwise. Past Medical History:   Diagnosis Date    Arthritis     [facility] [facility]     Diabetes ([facility] [facility]     Gout     Hypertension     Ill-defined condition     Sleep apnea       Past Surgical History:   Procedure Laterality Date    HX ORTHOPAEDIC      HX VEIN ABLATION ADHESIVE       Family History   Problem Relation Age of Onset    Heart Disease Mother     Kidney Disease Mother     Hypertension Mother     Diabetes Mother     Heart Disease Father     Hypertension Father     Diabetes Sister     Diabetes Brother       Social History     Tobacco Use    Smoking status: Never Smoker    Smokeless tobacco: Never Used   Substance Use Topics    Alcohol use: Yes       Prior to Admission medications    Medication Sig Start Date End Date Taking? Authorizing Provider   cloNIDine HCL (CATAPRES) 0.1 mg tablet Take 1 Tablet by mouth every twelve (12) hours. 5/27/21  Yes Provider, Historical   ammonium lactate (AMLACTIN) 12 % topical cream Apply  to affected area as needed for Other (xerosis). rub in to affected area well  Indications: dry skin 6/8/21  Yes Zuhair Patel MD   vitamin E, dl,tocopheryl acet, (vitamin E acetate) 400 unit capsule Take 2 Capsules by mouth two (2) times a day. 6/8/21  Yes Zuhair Patel MD   ascorbic acid, vitamin C, (Vitamin C) 500 mg tablet Take 1,000 mg by mouth two (2) times a day. Indications: inadequate vitamin C   Yes Provider, Historical   tamsulosin (Flomax) 0.4 mg capsule Take 0.4 mg by mouth daily. Yes Provider, Historical   tiotropium (Spiriva with HandiHaler) 18 mcg inhalation capsule Take 1 Capsule by inhalation daily.    Yes Provider, Historical   bumetanide (BUMEX) 2 mg tablet Take 2 mg by mouth two (2) times a day. Yes Provider, Historical   vitamin e (E GEMS) 1,000 unit capsule Take 1,000 Units by mouth two (2) times a day. Yes Provider, Historical   ferrous sulfate (Iron) 325 mg (65 mg iron) tablet Take  by mouth Daily (before breakfast). Yes Provider, Historical   aspirin 81 mg chewable tablet Take 81 mg by mouth daily. Yes Provider, Historical   flunisolide (NASAREL) 25 mcg (0.025 %) spry 2 Sprays two (2) times a day. Yes Provider, Historical   albuterol (ProAir HFA) 90 mcg/actuation inhaler Take  by inhalation. Yes Provider, Historical   colchicine 0.6 mg tablet Take 0.6 mg by mouth daily. Yes Provider, Historical   gabapentin (NEURONTIN) 300 mg capsule Take 300 mg by mouth four (4) times daily. Yes Provider, Historical   oxyCODONE IR (ROXICODONE) 5 mg immediate release tablet Take 5 mg by mouth every twelve (12) hours. Yes Provider, Historical   metFORMIN (GLUCOPHAGE) 500 mg tablet Take 1,000 mg by mouth two (2) times daily (with meals). Yes Provider, Historical   insulin lispro (HumaLOG U-100 Insulin) 100 unit/mL injection by SubCUTAneous route. Sliding scale   Yes Provider, Historical   multivitamin (ONE A DAY) tablet Take 1 Tab by mouth daily. Patient not taking: Reported on 6/14/2021    Provider, Historical   insulin glargine (LANTUS) 100 unit/mL injection by SubCUTAneous route nightly.  80units am/ 20units pm  Patient not taking: Reported on 6/14/2021    Provider, Historical     Allergies   Allergen Reactions    Elavil Unknown (comments)    Naproxen Other (comments)     Pt denies    Sulfa (Sulfonamide Antibiotics) Nausea and Vomiting        Review of Systems:  I reviewed the rest of organ systems personally and they were negative signed by Dr. Romeo Atwood    Objective:     Visit Vitals  /85 (BP 1 Location: Left upper arm, BP Patient Position: Sitting, BP Cuff Size: Adult)   Pulse (!) 103   Temp 98.3 °F (36.8 °C) (Temporal)   Resp 20   Wt (!) 371 lb 12.8 oz (168.6 kg)   SpO2 94% BMI 51.86 kg/m²     VITAL SIGNS REVIEWED. Physical Exam:  Patient is well-nourished pleasant in conversation is appropriate. Head and neck examination atraumatic, normocephalic. Gaze appropriate. Conversation appropriate. Neck examination shows supple. No mass. No obvious carotid bruit. Chest examination shows lungs are clear bilaterally well-expanded, no crackles or wheezes. Cardiovascular system regular rate, no obvious murmur. Skin warm to touch  and moist, no skin lesions. Abdomen is soft ,not tender or distended bowel sounds present. No palpable mass. Neurological examinations, no focal neuro deficits moving all 4 extremities. Cranial nerves intact. Sensation is intact as well. Hematologic: No obvious bruise or swelling or obvious lymphadenopathy. Psychosocial: Appropriate. Has good effect. Musculoskeletal system: No muscle wasting, appropriate movements upper and lower extremity. Vascular examination: I changed both leg Profore dressings today. Patient tolerated dressing changes well. Data Review:   No visits with results within 1 Month(s) from this visit. Latest known visit with results is:   No results displayed because visit has over 200 results. Assessment:     Problem List Items Addressed This Visit        Circulatory    Idiopathic chronic venous hypertension of left lower extremity with ulcer ([address] - Primary       Integumentary    Venous ulcer ([address]       Other    Localized edema              Plan:     Patient also has a home nurse visit helping the dressing changes with a Profore dressing both leg venous stasis ulcer. Patient will be reevaluated about a month follow-up. Hopefully by the time we should be closed.         Nomi Riggins MD

## 2021-08-12 ENCOUNTER — OFFICE VISIT (OUTPATIENT)
Dept: SURGERY | Age: 66
End: 2021-08-12
Payer: MEDICARE

## 2021-08-12 VITALS
BODY MASS INDEX: 44.1 KG/M2 | TEMPERATURE: 97.7 F | DIASTOLIC BLOOD PRESSURE: 87 MMHG | HEART RATE: 99 BPM | RESPIRATION RATE: 26 BRPM | OXYGEN SATURATION: 97 % | HEIGHT: 71 IN | WEIGHT: 315 LBS | SYSTOLIC BLOOD PRESSURE: 125 MMHG

## 2021-08-12 DIAGNOSIS — R60.0 LOCALIZED EDEMA: ICD-10-CM

## 2021-08-12 DIAGNOSIS — I87.312 IDIOPATHIC CHRONIC VENOUS HYPERTENSION OF LEFT LOWER EXTREMITY WITH ULCER (HCC): Primary | ICD-10-CM

## 2021-08-12 DIAGNOSIS — L03.115 CELLULITIS AND ABSCESS OF RIGHT LEG: ICD-10-CM

## 2021-08-12 DIAGNOSIS — L02.415 CELLULITIS AND ABSCESS OF RIGHT LEG: ICD-10-CM

## 2021-08-12 DIAGNOSIS — L97.929 IDIOPATHIC CHRONIC VENOUS HYPERTENSION OF LEFT LOWER EXTREMITY WITH ULCER (HCC): Primary | ICD-10-CM

## 2021-08-12 PROCEDURE — 3017F COLORECTAL CA SCREEN DOC REV: CPT | Performed by: SURGERY

## 2021-08-12 PROCEDURE — 1101F PT FALLS ASSESS-DOCD LE1/YR: CPT | Performed by: SURGERY

## 2021-08-12 PROCEDURE — 29581 APPL MULTLAYER CMPRN SYS LEG: CPT | Performed by: SURGERY

## 2021-08-12 PROCEDURE — G8536 NO DOC ELDER MAL SCRN: HCPCS | Performed by: SURGERY

## 2021-08-12 PROCEDURE — G8427 DOCREV CUR MEDS BY ELIG CLIN: HCPCS | Performed by: SURGERY

## 2021-08-12 PROCEDURE — G8417 CALC BMI ABV UP PARAM F/U: HCPCS | Performed by: SURGERY

## 2021-08-12 PROCEDURE — 99213 OFFICE O/P EST LOW 20 MIN: CPT | Performed by: SURGERY

## 2021-08-12 PROCEDURE — G8432 DEP SCR NOT DOC, RNG: HCPCS | Performed by: SURGERY

## 2021-08-12 RX ORDER — SACUBITRIL AND VALSARTAN 24; 26 MG/1; MG/1
2 TABLET, FILM COATED ORAL
COMMUNITY
Start: 2021-08-10 | End: 2022-07-06

## 2021-08-12 RX ORDER — METOPROLOL SUCCINATE 25 MG/1
TABLET, EXTENDED RELEASE ORAL
COMMUNITY
Start: 2021-08-09 | End: 2022-03-02

## 2021-08-12 NOTE — PROGRESS NOTES
Chief Complaint   Patient presents with    Follow-up     recheck legs     Visit Vitals  /87 (BP 1 Location: Left arm, BP Patient Position: Sitting, BP Cuff Size: Adult)   Pulse 99   Temp 97.7 °F (36.5 °C) (Temporal)   Resp 26   Ht 5' 11\" (1.803 m)   Wt (!) 372 lb (168.7 kg)   SpO2 97% Comment: O2 at 3 LPM via NC   BMI 51.88 kg/m²

## 2021-08-13 NOTE — PROGRESS NOTES
VASCULAR FOLLOW UP      Subjective:   CHIEF COMPLAINTS:  Venous ulcer  PRESENTATION OF ILLNESS:    Mr. Jaycee Montgomery is here today for dressing changes. He has been seen by home nurse twice weekly. And doing dressing changes. Past Medical History:   Diagnosis Date    Arthritis     [facility]     Diabetes [facility] [address] [facility]     Gout     Hypertension     Ill-defined condition     Sleep apnea       Past Surgical History:   Procedure Laterality Date    HX ORTHOPAEDIC      HX VEIN ABLATION ADHESIVE       Family History   Problem Relation Age of Onset    Heart Disease Mother     Kidney Disease Mother     Hypertension Mother     Diabetes Mother     Heart Disease Father     Hypertension Father     Diabetes Sister     Diabetes Brother       Social History     Tobacco Use    Smoking status: Never Smoker    Smokeless tobacco: Never Used   Substance Use Topics    Alcohol use: Yes       Prior to Admission medications    Medication Sig Start Date End Date Taking? Authorizing Provider   metoprolol succinate (TOPROL-XL) 25 mg XL tablet TAKE 1 TABLET BY MOUTH ONCE DAILY 8/9/21  Yes Provider, Historical   Entresto 24-26 mg tablet TAKE 1 TABLET BY MOUTH TWICE DAILY 8/10/21  Yes Provider, Historical   ammonium lactate (AMLACTIN) 12 % topical cream Apply  to affected area as needed for Other (xerosis). rub in to affected area well  Indications: dry skin 6/8/21  Yes Bennie Patel MD   vitamin E, dl,tocopheryl acet, (vitamin E acetate) 400 unit capsule Take 2 Capsules by mouth two (2) times a day. 6/8/21  Yes Bennie Patel MD   ascorbic acid, vitamin C, (Vitamin C) 500 mg tablet Take 1,000 mg by mouth two (2) times a day. Indications: inadequate vitamin C   Yes Provider, Historical   tamsulosin (Flomax) 0.4 mg capsule Take 0.4 mg by mouth daily. Yes Provider, Historical   tiotropium (Spiriva with HandiHaler) 18 mcg inhalation capsule Take 1 Capsule by inhalation daily.    Yes Provider, Historical   bumetanide (BUMEX) 2 mg tablet Take 2 mg by mouth two (2) times a day. Yes Provider, Historical   vitamin e (E GEMS) 1,000 unit capsule Take 1,000 Units by mouth two (2) times a day. Yes Provider, Historical   ferrous sulfate (Iron) 325 mg (65 mg iron) tablet Take  by mouth Daily (before breakfast). Yes Provider, Historical   aspirin 81 mg chewable tablet Take 81 mg by mouth daily. Yes Provider, Historical   flunisolide (NASAREL) 25 mcg (0.025 %) spry 2 Sprays two (2) times a day. Yes Provider, Historical   albuterol (ProAir HFA) 90 mcg/actuation inhaler Take  by inhalation. Yes Provider, Historical   colchicine 0.6 mg tablet Take 0.6 mg by mouth daily. Yes Provider, Historical   gabapentin (NEURONTIN) 300 mg capsule Take 300 mg by mouth four (4) times daily. Yes Provider, Historical   oxyCODONE IR (ROXICODONE) 5 mg immediate release tablet Take 5 mg by mouth every twelve (12) hours. Yes Provider, Historical   metFORMIN (GLUCOPHAGE) 500 mg tablet Take 1,000 mg by mouth two (2) times daily (with meals). Yes Provider, Historical   insulin lispro (HumaLOG U-100 Insulin) 100 unit/mL injection by SubCUTAneous route. Sliding scale   Yes Provider, Historical   cloNIDine HCL (CATAPRES) 0.1 mg tablet Take 1 Tablet by mouth every twelve (12) hours. Patient not taking: Reported on 8/12/2021 5/27/21   Provider, Historical   multivitamin (ONE A DAY) tablet Take 1 Tab by mouth daily. Patient not taking: Reported on 6/14/2021    Provider, Historical   insulin glargine (LANTUS) 100 unit/mL injection by SubCUTAneous route nightly.  80units am/ 20units pm  Patient not taking: Reported on 6/14/2021    Provider, Historical     Allergies   Allergen Reactions    Elavil Unknown (comments)    Naproxen Other (comments)     Pt denies    Sulfa (Sulfonamide Antibiotics) Nausea and Vomiting        Review of Systems:  I reviewed the rest of organ systems personally and they were negative signed by  KYE    Objective:     Visit Vitals  /87 (BP 1 Location: Left arm, BP Patient Position: Sitting, BP Cuff Size: Adult)   Pulse 99   Temp 97.7 °F (36.5 °C) (Temporal)   Resp 26   Ht 5' 11\" (1.803 m)   Wt (!) 372 lb (168.7 kg)   SpO2 97% Comment: O2 at 3 LPM via NC   BMI 51.88 kg/m²     VITAL SIGNS REVIEWED. Physical Exam:  Patient is well-nourished pleasant in conversation is appropriate. Head and neck examination atraumatic, normocephalic. Gaze appropriate. Conversation appropriate. Neck examination shows supple. No mass. No obvious carotid bruit. Chest examination shows lungs are clear bilaterally well-expanded, no crackles or wheezes. Cardiovascular system regular rate, no obvious murmur. Skin warm to touch  and moist, no skin lesions. Abdomen is soft ,not tender or distended bowel sounds present. No palpable mass. Neurological examinations, no focal neuro deficits moving all 4 extremities. Cranial nerves intact. Sensation is intact as well. Hematologic: No obvious bruise or swelling or obvious lymphadenopathy. Psychosocial: Appropriate. Has good effect. Musculoskeletal system: No muscle wasting, appropriate movements upper and lower extremity. Vascular examination: I remove the Profore dressing both legs. Wounds are currently closed on the right leg. Left leg wound still open. I reapplied both legs with a Profore dressing today. Data Review:   No visits with results within 1 Month(s) from this visit. Latest known visit with results is:   No results displayed because visit has over 200 results.            Assessment:     Problem List Items Addressed This Visit        Circulatory    Idiopathic chronic venous hypertension of left lower extremity with ulcer ([facility]) - Primary       Other    Localized edema    Relevant Medications    metoprolol succinate (TOPROL-XL) 25 mg XL tablet    Entresto 24-26 mg tablet    Cellulitis and abscess of right leg              Plan:     Patient will bring her compression stocking next visit hopefully will replace and transition the multilayer compression wraps to a compression stocking. I will see him back 2 months follow-up.         Ryan Chery MD

## 2021-08-30 ENCOUNTER — OFFICE VISIT (OUTPATIENT)
Dept: SURGERY | Age: 66
End: 2021-08-30
Payer: MEDICARE

## 2021-08-30 VITALS
BODY MASS INDEX: 44.1 KG/M2 | HEART RATE: 85 BPM | WEIGHT: 315 LBS | TEMPERATURE: 98.2 F | SYSTOLIC BLOOD PRESSURE: 138 MMHG | HEIGHT: 71 IN | DIASTOLIC BLOOD PRESSURE: 70 MMHG | OXYGEN SATURATION: 95 %

## 2021-08-30 DIAGNOSIS — L97.929 IDIOPATHIC CHRONIC VENOUS HYPERTENSION OF LEFT LOWER EXTREMITY WITH ULCER (HCC): Primary | ICD-10-CM

## 2021-08-30 DIAGNOSIS — I87.312 IDIOPATHIC CHRONIC VENOUS HYPERTENSION OF LEFT LOWER EXTREMITY WITH ULCER (HCC): Primary | ICD-10-CM

## 2021-08-30 PROCEDURE — 99213 OFFICE O/P EST LOW 20 MIN: CPT | Performed by: SURGERY

## 2021-08-30 PROCEDURE — G8432 DEP SCR NOT DOC, RNG: HCPCS | Performed by: SURGERY

## 2021-08-30 PROCEDURE — G8536 NO DOC ELDER MAL SCRN: HCPCS | Performed by: SURGERY

## 2021-08-30 PROCEDURE — G8417 CALC BMI ABV UP PARAM F/U: HCPCS | Performed by: SURGERY

## 2021-08-30 PROCEDURE — G8427 DOCREV CUR MEDS BY ELIG CLIN: HCPCS | Performed by: SURGERY

## 2021-08-30 PROCEDURE — 29581 APPL MULTLAYER CMPRN SYS LEG: CPT | Performed by: SURGERY

## 2021-08-30 PROCEDURE — 3017F COLORECTAL CA SCREEN DOC REV: CPT | Performed by: SURGERY

## 2021-08-30 PROCEDURE — 1101F PT FALLS ASSESS-DOCD LE1/YR: CPT | Performed by: SURGERY

## 2021-08-30 NOTE — PROGRESS NOTES
Chief Complaint   Patient presents with    Follow-up     2 week - nehemiah legs     Visit Vitals  /70 (BP 1 Location: Left arm, BP Patient Position: Sitting, BP Cuff Size: Adult)   Pulse 85   Temp 98.2 °F (36.8 °C) (Temporal)   Ht 5' 11\" (1.803 m)   Wt (!) 382 lb (173.3 kg)   SpO2 95% Comment: O2 at 3LPM via NC   BMI 53.28 kg/m²     1. Have you been to the ER, urgent care clinic since your last visit? Hospitalized since your last visit? No    2. Have you seen or consulted any other health care providers outside of the [address] [place] [address] since your last visit? Include any pap smears or colon screening.  Yes

## 2021-09-01 NOTE — PROGRESS NOTES
VASCULAR FOLLOW UP      Subjective:   CHIEF COMPLAINTS:  Venous stasis ulcer. PRESENTATION OF ILLNESS:  Mr. Alma Delia Ortiz is here today follow-up 2 weeks. Patient currently have a compression Ace wrap on the right leg. And Profore dressing on the left leg. Patient also being seen by home nurse for wound care and dressing changes. Last visit was 2 weeks ago. Last time right leg wounds healed. So I applied Ace wraps. Patient has healing wounds on the left pretibial area secondary to swelling and the venous stasis ulcer. Patient also has congestive heart failure which has the same effect on healing process. Patient currently waiting for pacemaker adjustment. Past Medical History:   Diagnosis Date    Arthritis     Chronic obstructive pulmonary disease ([address]     Diabetes [facility] [address]     Gout     Hypertension     Ill-defined condition     Sleep apnea       Past Surgical History:   Procedure Laterality Date    HX ORTHOPAEDIC      HX VEIN ABLATION ADHESIVE       Family History   Problem Relation Age of Onset    Heart Disease Mother     Kidney Disease Mother     Hypertension Mother     Diabetes Mother     Heart Disease Father     Hypertension Father     Diabetes Sister     Diabetes Brother       Social History     Tobacco Use    Smoking status: Never Smoker    Smokeless tobacco: Never Used   Substance Use Topics    Alcohol use: Yes       Prior to Admission medications    Medication Sig Start Date End Date Taking? Authorizing Provider   Entresto 24-26 mg tablet Take 2 Tablets by mouth. 8/10/21  Yes Provider, Historical   ammonium lactate (AMLACTIN) 12 % topical cream Apply  to affected area as needed for Other (xerosis). rub in to affected area well  Indications: dry skin 6/8/21  Yes Efrain Patel MD   vitamin E, dl,tocopheryl acet, (vitamin E acetate) 400 unit capsule Take 2 Capsules by mouth two (2) times a day.  6/8/21  Yes Efrain Patel MD   ascorbic acid, vitamin C, (Vitamin C) 500 mg tablet Take 1,000 mg by mouth two (2) times a day. Indications: inadequate vitamin C   Yes Provider, Historical   tamsulosin (Flomax) 0.4 mg capsule Take 0.4 mg by mouth daily. Yes Provider, Historical   tiotropium (Spiriva with HandiHaler) 18 mcg inhalation capsule Take 1 Capsule by inhalation daily. Yes Provider, Historical   bumetanide (BUMEX) 2 mg tablet Take 2 mg by mouth two (2) times a day. Yes Provider, Historical   vitamin e (E GEMS) 1,000 unit capsule Take 1,000 Units by mouth two (2) times a day. Yes Provider, Historical   ferrous sulfate (Iron) 325 mg (65 mg iron) tablet Take  by mouth Daily (before breakfast). Yes Provider, Historical   aspirin 81 mg chewable tablet Take 81 mg by mouth daily. Yes Provider, Historical   flunisolide (NASAREL) 25 mcg (0.025 %) spry 2 Sprays two (2) times a day. Yes Provider, Historical   albuterol (ProAir HFA) 90 mcg/actuation inhaler Take  by inhalation. Yes Provider, Historical   colchicine 0.6 mg tablet Take 0.6 mg by mouth daily. Yes Provider, Historical   gabapentin (NEURONTIN) 300 mg capsule Take 300 mg by mouth four (4) times daily. Yes Provider, Historical   oxyCODONE IR (ROXICODONE) 5 mg immediate release tablet Take 5 mg by mouth every twelve (12) hours. Yes Provider, Historical   metFORMIN (GLUCOPHAGE) 500 mg tablet Take 1,000 mg by mouth two (2) times daily (with meals). Yes Provider, Historical   insulin lispro (HumaLOG U-100 Insulin) 100 unit/mL injection by SubCUTAneous route. Sliding scale   Yes Provider, Historical   insulin glargine (LANTUS) 100 unit/mL injection by SubCUTAneous route nightly. 80units am/ 20units pm    Yes Provider, Historical   metoprolol succinate (TOPROL-XL) 25 mg XL tablet TAKE 1 TABLET BY MOUTH ONCE DAILY  Patient not taking: Reported on 8/30/2021 8/9/21   Provider, Historical   cloNIDine HCL (CATAPRES) 0.1 mg tablet Take 1 Tablet by mouth every twelve (12) hours.   Patient not taking: Reported on 8/12/2021 5/27/21 Provider, Historical   multivitamin (ONE A DAY) tablet Take 1 Tab by mouth daily. Patient not taking: Reported on 6/14/2021    Provider, Historical     Allergies   Allergen Reactions    Elavil Unknown (comments)    Naproxen Other (comments)     Pt denies    Sulfa (Sulfonamide Antibiotics) Nausea and Vomiting        Review of Systems:  I reviewed the rest of organ systems personally and they were negative signed by Dr. Kathleen Harp    Objective:     Visit Vitals  /70 (BP 1 Location: Left arm, BP Patient Position: Sitting, BP Cuff Size: Adult)   Pulse 85   Temp 98.2 °F (36.8 °C) (Temporal)   Ht 5' 11\" (1.803 m)   Wt (!) 382 lb (173.3 kg)   SpO2 95% Comment: O2 at 3LPM via NC   BMI 53.28 kg/m²     VITAL SIGNS REVIEWED. Physical Exam:  Patient is well-nourished pleasant in conversation is appropriate. Head and neck examination atraumatic, normocephalic. Gaze appropriate. Conversation appropriate. Neck examination shows supple. No mass. No obvious carotid bruit. Chest examination shows lungs are clear bilaterally well-expanded, no crackles or wheezes. Cardiovascular system regular rate, no obvious murmur. Skin warm to touch  and moist, no skin lesions. Abdomen is soft ,not tender or distended bowel sounds present. No palpable mass. Neurological examinations, no focal neuro deficits moving all 4 extremities. Cranial nerves intact. Sensation is intact as well. Hematologic: No obvious bruise or swelling or obvious lymphadenopathy. Psychosocial: Appropriate. Has good effect. Musculoskeletal system: No muscle wasting, appropriate movements upper and lower extremity. Vascular examination: I remove the dressing on both legs. Right leg swelling is minimal Ace wraps applied. Left leg wound has shrunk about 50% currently open wound measures to be about 2 cm diameter very superficial no drainage. I applied Aquacel Ag and Profore dressing on the left leg.         Data Review:   No visits with results within 1 Month(s) from this visit. Latest known visit with results is:   No results displayed because visit has over 200 results. Assessment:     Problem List Items Addressed This Visit        Circulatory    Idiopathic chronic venous hypertension of left lower extremity with ulcer ([address] - Primary              Plan:     Patient will be reassessed in 2 weeks follow-up both lower extremity wound examination. I anticipate wound should heal progress well and completely closed. Patient currently waiting for custom made compression stocking from lymphedema clinic. I will reassess him again 2 weeks follow-up. Meanwhile next week wound care and dressing change will be done by home health nurse visit.         Nayan Chavez MD

## 2021-09-13 ENCOUNTER — OFFICE VISIT (OUTPATIENT)
Dept: SURGERY | Age: 66
End: 2021-09-13
Payer: MEDICARE

## 2021-09-13 VITALS
OXYGEN SATURATION: 94 % | WEIGHT: 315 LBS | HEIGHT: 71 IN | SYSTOLIC BLOOD PRESSURE: 90 MMHG | DIASTOLIC BLOOD PRESSURE: 60 MMHG | TEMPERATURE: 97.1 F | HEART RATE: 100 BPM | BODY MASS INDEX: 44.1 KG/M2 | RESPIRATION RATE: 22 BRPM

## 2021-09-13 DIAGNOSIS — L97.909 VENOUS ULCER (HCC): Primary | ICD-10-CM

## 2021-09-13 DIAGNOSIS — I83.009 VENOUS ULCER (HCC): Primary | ICD-10-CM

## 2021-09-13 PROCEDURE — G8536 NO DOC ELDER MAL SCRN: HCPCS | Performed by: SURGERY

## 2021-09-13 PROCEDURE — G8427 DOCREV CUR MEDS BY ELIG CLIN: HCPCS | Performed by: SURGERY

## 2021-09-13 PROCEDURE — 99213 OFFICE O/P EST LOW 20 MIN: CPT | Performed by: SURGERY

## 2021-09-13 PROCEDURE — 3017F COLORECTAL CA SCREEN DOC REV: CPT | Performed by: SURGERY

## 2021-09-13 PROCEDURE — 29581 APPL MULTLAYER CMPRN SYS LEG: CPT | Performed by: SURGERY

## 2021-09-13 PROCEDURE — 1101F PT FALLS ASSESS-DOCD LE1/YR: CPT | Performed by: SURGERY

## 2021-09-13 PROCEDURE — G8417 CALC BMI ABV UP PARAM F/U: HCPCS | Performed by: SURGERY

## 2021-09-13 PROCEDURE — G8510 SCR DEP NEG, NO PLAN REQD: HCPCS | Performed by: SURGERY

## 2021-09-13 NOTE — PROGRESS NOTES
Visit Vitals  BP 90/60 (BP 1 Location: Left upper arm, BP Patient Position: Sitting, BP Cuff Size: Large adult)   Pulse 100   Temp 97.1 °F (36.2 °C) (Temporal)   Resp 22   Ht 5' 11\" (1.803 m)   Wt (!) 357 lb (161.9 kg)   SpO2 94% Comment: pt on 3liters O2   BMI 49.79 kg/m²     Chief Complaint   Patient presents with    Follow-up   1. Have you been to the ER, urgent care clinic since your last visit? Hospitalized since your last visit? No    2. Have you seen or consulted any other health care providers outside of the [address] [place] [address] since your last visit? Include any pap smears or colon screening.  No

## 2021-09-16 NOTE — PROGRESS NOTES
VASCULAR FOLLOW UP      Subjective:   CHIEF COMPLAINTS:  Venous stasis ulcer. PRESENTATION OF ILLNESS:  Mr. Queen Sanchez is here today 2 weeks follow-up. We are currently applying Profore dressing on the left leg. Ace wraps on the right leg. Patient asked me today trim the toenails today. Past Medical History:   Diagnosis Date    Arthritis     CAD (coronary artery disease)     Chronic obstructive pulmonary disease ([address]     Diabetes ([address]     Gout     Hypertension     Ill-defined condition     Sleep apnea       Past Surgical History:   Procedure Laterality Date    HX ORTHOPAEDIC      HX VEIN ABLATION ADHESIVE       Family History   Problem Relation Age of Onset    Heart Disease Mother     Kidney Disease Mother     Hypertension Mother     Diabetes Mother     Heart Disease Father     Hypertension Father     Diabetes Sister     Diabetes Brother       Social History     Tobacco Use    Smoking status: Never Smoker    Smokeless tobacco: Never Used   Substance Use Topics    Alcohol use: Yes       Prior to Admission medications    Medication Sig Start Date End Date Taking? Authorizing Provider   Entresto 24-26 mg tablet Take 2 Tablets by mouth. 8/10/21  Yes Provider, Historical   vitamin E, dl,tocopheryl acet, (vitamin E acetate) 400 unit capsule Take 2 Capsules by mouth two (2) times a day. 6/8/21  Yes Bob Patel MD   ascorbic acid, vitamin C, (Vitamin C) 500 mg tablet Take 1,000 mg by mouth two (2) times a day. Indications: inadequate vitamin C   Yes Provider, Historical   tamsulosin (Flomax) 0.4 mg capsule Take 0.4 mg by mouth daily. Yes Provider, Historical   tiotropium (Spiriva with HandiHaler) 18 mcg inhalation capsule Take 1 Capsule by inhalation daily. Yes Provider, Historical   bumetanide (BUMEX) 2 mg tablet Take 2 mg by mouth two (2) times a day. Yes Provider, Historical   vitamin e (E GEMS) 1,000 unit capsule Take 1,000 Units by mouth two (2) times a day.    Yes Provider, Historical   ferrous sulfate (Iron) 325 mg (65 mg iron) tablet Take  by mouth Daily (before breakfast). Yes Provider, Historical   aspirin 81 mg chewable tablet Take 81 mg by mouth daily. Yes Provider, Historical   flunisolide (NASAREL) 25 mcg (0.025 %) spry 2 Sprays two (2) times a day. Yes Provider, Historical   colchicine 0.6 mg tablet Take 0.6 mg by mouth daily. Yes Provider, Historical   gabapentin (NEURONTIN) 300 mg capsule Take 300 mg by mouth four (4) times daily. Yes Provider, Historical   oxyCODONE IR (ROXICODONE) 5 mg immediate release tablet Take 5 mg by mouth every twelve (12) hours. Yes Provider, Historical   metFORMIN (GLUCOPHAGE) 500 mg tablet Take 1,000 mg by mouth two (2) times daily (with meals). Yes Provider, Historical   insulin lispro (HumaLOG U-100 Insulin) 100 unit/mL injection by SubCUTAneous route. Sliding scale   Yes Provider, Historical   insulin glargine (LANTUS) 100 unit/mL injection by SubCUTAneous route nightly. 80units am/ 20units pm    Yes Provider, Historical   metoprolol succinate (TOPROL-XL) 25 mg XL tablet TAKE 1 TABLET BY MOUTH ONCE DAILY  Patient not taking: Reported on 8/30/2021 8/9/21   Provider, Historical   cloNIDine HCL (CATAPRES) 0.1 mg tablet Take 1 Tablet by mouth every twelve (12) hours. Patient not taking: Reported on 8/12/2021 5/27/21   Provider, Historical   ammonium lactate (AMLACTIN) 12 % topical cream Apply  to affected area as needed for Other (xerosis). rub in to affected area well  Indications: dry skin 6/8/21   Marvin Patel MD   multivitamin (ONE A DAY) tablet Take 1 Tab by mouth daily. Patient not taking: Reported on 6/14/2021    Provider, Historical   albuterol (ProAir HFA) 90 mcg/actuation inhaler Take  by inhalation.     Provider, Historical     Allergies   Allergen Reactions    Elavil Angioedema    Naproxen Unknown (comments)     Pt not sure of reaction    Sulfa (Sulfonamide Antibiotics) Nausea and Vomiting        Review of Systems:  I reviewed the rest of organ systems personally and they were negative signed by Dr. Jeffery Sneed    Objective:     Visit Vitals  BP 90/60 (BP 1 Location: Left upper arm, BP Patient Position: Sitting, BP Cuff Size: Large adult)   Pulse 100   Temp 97.1 °F (36.2 °C) (Temporal)   Resp 22   Ht 5' 11\" (1.803 m)   Wt (!) 357 lb (161.9 kg)   SpO2 94% Comment: pt on 3liters O2   BMI 49.79 kg/m²     VITAL SIGNS REVIEWED. Physical Exam:  Patient is well-nourished pleasant in conversation is appropriate. Head and neck examination atraumatic, normocephalic. Gaze appropriate. Conversation appropriate. Neck examination shows supple. No mass. No obvious carotid bruit. Chest examination shows lungs are clear bilaterally well-expanded, no crackles or wheezes. Cardiovascular system regular rate, no obvious murmur. Skin warm to touch  and moist, no skin lesions. Abdomen is soft ,not tender or distended bowel sounds present. No palpable mass. Neurological examinations, no focal neuro deficits moving all 4 extremities. Cranial nerves intact. Sensation is intact as well. Hematologic: No obvious bruise or swelling or obvious lymphadenopathy. Psychosocial: Appropriate. Has good effect. Musculoskeletal system: No muscle wasting, appropriate movements upper and lower extremity. Vascular examination: I applied a Profore dressing on the left ankle. Wounds are smaller. And the left side was almost completely healed. Right leg wounds closed. I did trim the toenails today. Data Review:   No visits with results within 1 Month(s) from this visit. Latest known visit with results is:   No results displayed because visit has over 200 results. Assessment:     Problem List Items Addressed This Visit        Integumentary    Venous ulcer ([address] - Primary              Plan:     Patient was evaluated in 2 weeks for Profore dressing changes.  I am hoping the wound should be closed by the time then we will apply Ace wraps until he get a custom made compression stockings.         Nirav Williamson MD

## 2021-09-27 ENCOUNTER — OFFICE VISIT (OUTPATIENT)
Dept: SURGERY | Age: 66
End: 2021-09-27
Payer: MEDICARE

## 2021-09-27 VITALS
DIASTOLIC BLOOD PRESSURE: 68 MMHG | BODY MASS INDEX: 51.37 KG/M2 | HEART RATE: 94 BPM | SYSTOLIC BLOOD PRESSURE: 113 MMHG | TEMPERATURE: 97.7 F | OXYGEN SATURATION: 93 % | RESPIRATION RATE: 18 BRPM | WEIGHT: 315 LBS

## 2021-09-27 DIAGNOSIS — I83.009 VENOUS ULCER (HCC): Primary | ICD-10-CM

## 2021-09-27 DIAGNOSIS — L97.909 VENOUS ULCER (HCC): Primary | ICD-10-CM

## 2021-09-27 PROCEDURE — 1101F PT FALLS ASSESS-DOCD LE1/YR: CPT | Performed by: SURGERY

## 2021-09-27 PROCEDURE — G8510 SCR DEP NEG, NO PLAN REQD: HCPCS | Performed by: SURGERY

## 2021-09-27 PROCEDURE — 3017F COLORECTAL CA SCREEN DOC REV: CPT | Performed by: SURGERY

## 2021-09-27 PROCEDURE — 99213 OFFICE O/P EST LOW 20 MIN: CPT | Performed by: SURGERY

## 2021-09-27 PROCEDURE — G8427 DOCREV CUR MEDS BY ELIG CLIN: HCPCS | Performed by: SURGERY

## 2021-09-27 PROCEDURE — G8536 NO DOC ELDER MAL SCRN: HCPCS | Performed by: SURGERY

## 2021-09-27 PROCEDURE — G8417 CALC BMI ABV UP PARAM F/U: HCPCS | Performed by: SURGERY

## 2021-09-27 PROCEDURE — 29581 APPL MULTLAYER CMPRN SYS LEG: CPT | Performed by: SURGERY

## 2021-09-27 RX ORDER — CEPHALEXIN 500 MG/1
500 CAPSULE ORAL 4 TIMES DAILY
Qty: 40 CAPSULE | Refills: 0 | Status: SHIPPED | OUTPATIENT
Start: 2021-09-27 | End: 2021-10-07

## 2021-09-27 NOTE — PROGRESS NOTES
Identified pt with two pt identifiers(name and ). Reviewed record in preparation for visit and have obtained necessary documentation. Chief Complaint   Patient presents with    Follow-up    Wound Check     BLE      Vitals:    21 1439 21 1440   BP: 113/68    Pulse: (!) 112 94   Resp: 18    Temp: 97.7 °F (36.5 °C)    TempSrc: Temporal    SpO2: 93%    Weight: (!) 368 lb 4.8 oz (167.1 kg)    PainSc:   9    PainLoc: Back        Health Maintenance Review: Patient reminded of \"due or due soon\" health maintenance. I have asked the patient to contact his/her primary care provider (PCP) for follow-up on his/her health maintenance. Coordination of Care Questionnaire:  :   1) Have you been to an emergency room, urgent care, or hospitalized since your last visit? If yes, where when, and reason for visit? no       2. Have seen or consulted any other health care provider since your last visit? If yes, where when, and reason for visit? YES [facility] for pacemaker change. Pt is inquiring why his L foot hurts. Pt states there is sharp pain that isn't neuropathy related that is very seldomly relieved by positional changes. Pt states that the pain radiates to his L ankle but has no loss or ROM.

## 2021-09-30 NOTE — PROGRESS NOTES
VASCULAR FOLLOW UP      Subjective:   CHIEF COMPLAINTS:  Venous stasis ulcer. PRESENTATION OF ILLNESS:    Mr. Adela Garrison is here today follow-up in 2 weeks for wound examination venous stasis ulcer on the left side. Right leg wounds are closed. So we are doing ice wraps for now on the right leg however we are doing Profore dressing on the left leg. Patient complaining significant pain on the left foot today. Past Medical History:   Diagnosis Date    Arthritis     CAD (coronary artery disease)     [facility]     Diabetes ([facility]     Gout     Hypertension     Ill-defined condition     Sleep apnea       Past Surgical History:   Procedure Laterality Date    HX ORTHOPAEDIC      HX PACEMAKER      HX VEIN ABLATION ADHESIVE       Family History   Problem Relation Age of Onset    Heart Disease Mother     Kidney Disease Mother     Hypertension Mother     Diabetes Mother     Heart Disease Father     Hypertension Father     Diabetes Sister     Diabetes Brother       Social History     Tobacco Use    Smoking status: Never Smoker    Smokeless tobacco: Never Used   Substance Use Topics    Alcohol use: Yes       Prior to Admission medications    Medication Sig Start Date End Date Taking? Authorizing Provider   cephALEXin (KEFLEX) 500 mg capsule Take 1 Capsule by mouth four (4) times daily for 10 days. 9/27/21 10/7/21 Yes Yan Hein MD   Entresto 24-26 mg tablet Take 2 Tablets by mouth. 8/10/21  Yes Provider, Historical   ammonium lactate (AMLACTIN) 12 % topical cream Apply  to affected area as needed for Other (xerosis). rub in to affected area well  Indications: dry skin 6/8/21  Yes Gena Patel MD   vitamin E, dl,tocopheryl acet, (vitamin E acetate) 400 unit capsule Take 2 Capsules by mouth two (2) times a day. 6/8/21  Yes eGna Patel MD   ascorbic acid, vitamin C, (Vitamin C) 500 mg tablet Take 1,000 mg by mouth two (2) times a day.  Indications: inadequate vitamin C   Yes Provider, Historical   tamsulosin (Flomax) 0.4 mg capsule Take 0.4 mg by mouth daily. Yes Provider, Historical   tiotropium (Spiriva with HandiHaler) 18 mcg inhalation capsule Take 1 Capsule by inhalation daily. Yes Provider, Historical   bumetanide (BUMEX) 2 mg tablet Take 2 mg by mouth two (2) times a day. Yes Provider, Historical   vitamin e (E GEMS) 1,000 unit capsule Take 1,000 Units by mouth two (2) times a day. Yes Provider, Historical   ferrous sulfate (Iron) 325 mg (65 mg iron) tablet Take  by mouth Daily (before breakfast). Yes Provider, Historical   aspirin 81 mg chewable tablet Take 81 mg by mouth daily. Yes Provider, Historical   flunisolide (NASAREL) 25 mcg (0.025 %) spry 2 Sprays two (2) times a day. Yes Provider, Historical   albuterol (ProAir HFA) 90 mcg/actuation inhaler Take  by inhalation. Yes Provider, Historical   colchicine 0.6 mg tablet Take 0.6 mg by mouth daily. Yes Provider, Historical   gabapentin (NEURONTIN) 300 mg capsule Take 300 mg by mouth four (4) times daily. Yes Provider, Historical   oxyCODONE IR (ROXICODONE) 5 mg immediate release tablet Take 5 mg by mouth every twelve (12) hours. Yes Provider, Historical   metFORMIN (GLUCOPHAGE) 500 mg tablet Take 1,000 mg by mouth two (2) times daily (with meals). Yes Provider, Historical   insulin lispro (HumaLOG U-100 Insulin) 100 unit/mL injection by SubCUTAneous route. Sliding scale   Yes Provider, Historical   insulin glargine (LANTUS) 100 unit/mL injection by SubCUTAneous route nightly. 80units am/ 20units pm    Yes Provider, Historical   metoprolol succinate (TOPROL-XL) 25 mg XL tablet TAKE 1 TABLET BY MOUTH ONCE DAILY  Patient not taking: Reported on 8/30/2021 8/9/21   Provider, Historical   cloNIDine HCL (CATAPRES) 0.1 mg tablet Take 1 Tablet by mouth every twelve (12) hours.   Patient not taking: Reported on 8/12/2021 5/27/21   Provider, Historical   multivitamin (ONE A DAY) tablet Take 1 Tab by mouth daily. Patient not taking: Reported on 6/14/2021    Provider, Historical     Allergies   Allergen Reactions    Elavil Angioedema    Naproxen Unknown (comments)     Pt not sure of reaction    Sulfa (Sulfonamide Antibiotics) Nausea and Vomiting        Review of Systems:  I reviewed the rest of organ systems personally and they were negative signed by Dr. Kaelyn Higgins    Objective:     Visit Vitals  /68 (BP 1 Location: Left upper arm, BP Patient Position: Sitting, BP Cuff Size: Adult long)   Pulse 94   Temp 97.7 °F (36.5 °C) (Temporal)   Resp 18   Wt (!) 368 lb 4.8 oz (167.1 kg)   SpO2 93%   BMI 51.37 kg/m²     VITAL SIGNS REVIEWED. Physical Exam:  Patient is well-nourished pleasant in conversation is appropriate. Head and neck examination atraumatic, normocephalic. Gaze appropriate. Conversation appropriate. Neck examination shows supple. No mass. No obvious carotid bruit. Chest examination shows lungs are clear bilaterally well-expanded, no crackles or wheezes. Cardiovascular system regular rate, no obvious murmur. Skin warm to touch  and moist, no skin lesions. Abdomen is soft ,not tender or distended bowel sounds present. No palpable mass. Neurological examinations, no focal neuro deficits moving all 4 extremities. Cranial nerves intact. Sensation is intact as well. Hematologic: No obvious bruise or swelling or obvious lymphadenopathy. Psychosocial: Appropriate. Has good effect. Musculoskeletal system: No muscle wasting, appropriate movements upper and lower extremity. Vascular examination: I remove the dressings on both legs. Wounds are closed on the right leg I rewrapped the right leg with Ace wraps. However the left leg does have some cellulitis on the foot area and may have the Profore dressing may be too tight. So I applied a multilayer gentle wraps on the left leg. Data Review:   No visits with results within 1 Month(s) from this visit.    Latest known visit with results is:   No results displayed because visit has over 200 results. Assessment:     Problem List Items Addressed This Visit        Integumentary    Venous ulcer ([address] - Primary    Relevant Medications    cephALEXin (KEFLEX) 500 mg capsule              Plan:     Patient will need oral antibiotics we will try that with modified compression wraps to be done every other day and I will reassess him again next week.         Devorah Hills MD

## 2021-10-04 ENCOUNTER — OFFICE VISIT (OUTPATIENT)
Dept: SURGERY | Age: 66
End: 2021-10-04
Payer: MEDICARE

## 2021-10-04 VITALS
TEMPERATURE: 97.8 F | SYSTOLIC BLOOD PRESSURE: 118 MMHG | RESPIRATION RATE: 18 BRPM | HEIGHT: 71 IN | HEART RATE: 86 BPM | BODY MASS INDEX: 44.1 KG/M2 | WEIGHT: 315 LBS | OXYGEN SATURATION: 98 % | DIASTOLIC BLOOD PRESSURE: 77 MMHG

## 2021-10-04 DIAGNOSIS — L97.909 VENOUS ULCER (HCC): Primary | ICD-10-CM

## 2021-10-04 DIAGNOSIS — I83.009 VENOUS ULCER (HCC): Primary | ICD-10-CM

## 2021-10-04 PROCEDURE — 29581 APPL MULTLAYER CMPRN SYS LEG: CPT | Performed by: SURGERY

## 2021-10-04 PROCEDURE — G8536 NO DOC ELDER MAL SCRN: HCPCS | Performed by: SURGERY

## 2021-10-04 PROCEDURE — G8427 DOCREV CUR MEDS BY ELIG CLIN: HCPCS | Performed by: SURGERY

## 2021-10-04 PROCEDURE — G8510 SCR DEP NEG, NO PLAN REQD: HCPCS | Performed by: SURGERY

## 2021-10-04 PROCEDURE — G8417 CALC BMI ABV UP PARAM F/U: HCPCS | Performed by: SURGERY

## 2021-10-04 PROCEDURE — 1101F PT FALLS ASSESS-DOCD LE1/YR: CPT | Performed by: SURGERY

## 2021-10-04 PROCEDURE — 99213 OFFICE O/P EST LOW 20 MIN: CPT | Performed by: SURGERY

## 2021-10-04 PROCEDURE — 3017F COLORECTAL CA SCREEN DOC REV: CPT | Performed by: SURGERY

## 2021-10-04 NOTE — PROGRESS NOTES
Chief Complaint   Patient presents with    Follow-up     Left Foot Wound check      Visit Vitals  /77 (BP 1 Location: Left arm, BP Patient Position: Sitting)   Pulse 86   Temp 97.8 °F (36.6 °C) (Temporal)   Resp 18   Ht 5' 11\" (1.803 m)   Wt (!) 364 lb 9.6 oz (165.4 kg)   SpO2 98% Comment: on 3L NC   BMI 50.85 kg/m²     1. Have you been to the ER, urgent care clinic since your last visit? Hospitalized since your last visit? Yes When: 09/20/2021    2. Have you seen or consulted any other health care providers outside of the [address] [place] [address] since your last visit? Include any pap smears or colon screening.  Yes

## 2021-10-07 NOTE — PROGRESS NOTES
VASCULAR FOLLOW UP      Subjective:   CHIEF COMPLAINTS:  Patient is here today recheck wound on the left leg. PRESENTATION OF ILLNESS:    Mr. Pati Drummond is here today 1 week follow-up. I applied the modified compression wraps on the left leg especially because the pain and cellulitis. I did this last week. Patient also prescribed antibiotics. Patient is here today wound checkup. Past Medical History:   Diagnosis Date    Arthritis     CAD (coronary artery disease)     [facility] obstructive [facility] disease ([facility]     Diabetes ([facility]     Gout     Hypertension     Ill-defined condition     Sleep apnea       Past Surgical History:   Procedure Laterality Date    HX ORTHOPAEDIC      HX PACEMAKER      HX VEIN ABLATION ADHESIVE       Family History   Problem Relation Age of Onset    Heart Disease Mother     Kidney Disease Mother     Hypertension Mother     Diabetes Mother     Heart Disease Father     Hypertension Father     Diabetes Sister     Diabetes Brother       Social History     Tobacco Use    Smoking status: Never Smoker    Smokeless tobacco: Never Used   Substance Use Topics    Alcohol use: Not Currently       Prior to Admission medications    Medication Sig Start Date End Date Taking? Authorizing Provider   cephALEXin (KEFLEX) 500 mg capsule Take 1 Capsule by mouth four (4) times daily for 10 days. 9/27/21 10/7/21 Yes Sayra Velarde MD   Entresto 24-26 mg tablet Take 2 Tablets by mouth. 8/10/21  Yes Provider, Historical   ammonium lactate (AMLACTIN) 12 % topical cream Apply  to affected area as needed for Other (xerosis). rub in to affected area well  Indications: dry skin 6/8/21  Yes Dillon Patel MD   vitamin E, dl,tocopheryl acet, (vitamin E acetate) 400 unit capsule Take 2 Capsules by mouth two (2) times a day. 6/8/21  Yes Dillon Patel MD   ascorbic acid, vitamin C, (Vitamin C) 500 mg tablet Take 1,000 mg by mouth two (2) times a day.  Indications: inadequate vitamin C   Yes Provider, Historical   tamsulosin (Flomax) 0.4 mg capsule Take 0.4 mg by mouth daily. Yes Provider, Historical   tiotropium (Spiriva with HandiHaler) 18 mcg inhalation capsule Take 1 Capsule by inhalation daily. Yes Provider, Historical   bumetanide (BUMEX) 2 mg tablet Take 2 mg by mouth two (2) times a day. Yes Provider, Historical   ferrous sulfate (Iron) 325 mg (65 mg iron) tablet Take  by mouth Daily (before breakfast). Yes Provider, Historical   aspirin 81 mg chewable tablet Take 81 mg by mouth daily. Yes Provider, Historical   flunisolide (NASAREL) 25 mcg (0.025 %) spry 2 Sprays two (2) times a day. Yes Provider, Historical   albuterol (ProAir HFA) 90 mcg/actuation inhaler Take  by inhalation. Yes Provider, Historical   colchicine 0.6 mg tablet Take 0.6 mg by mouth daily. Yes Provider, Historical   gabapentin (NEURONTIN) 300 mg capsule Take 300 mg by mouth four (4) times daily. Yes Provider, Historical   oxyCODONE IR (ROXICODONE) 5 mg immediate release tablet Take 5 mg by mouth every twelve (12) hours. Yes Provider, Historical   metFORMIN (GLUCOPHAGE) 500 mg tablet Take 1,000 mg by mouth two (2) times daily (with meals). Yes Provider, Historical   insulin lispro (HumaLOG U-100 Insulin) 100 unit/mL injection by SubCUTAneous route. Sliding scale   Yes Provider, Historical   metoprolol succinate (TOPROL-XL) 25 mg XL tablet TAKE 1 TABLET BY MOUTH ONCE DAILY  Patient not taking: Reported on 8/30/2021 8/9/21   Provider, Historical   cloNIDine HCL (CATAPRES) 0.1 mg tablet Take 1 Tablet by mouth every twelve (12) hours. Patient not taking: Reported on 8/12/2021 5/27/21   Provider, Historical   vitamin e (E GEMS) 1,000 unit capsule Take 1,000 Units by mouth two (2) times a day. Patient not taking: Reported on 10/4/2021    Provider, Historical   multivitamin (ONE A DAY) tablet Take 1 Tab by mouth daily.   Patient not taking: Reported on 6/14/2021    Provider, Historical   insulin glargine (LANTUS) 100 unit/mL injection by SubCUTAneous route nightly. 80units am/ 32units pm    Provider, Historical     Allergies   Allergen Reactions    Elavil Angioedema    Naproxen Unknown (comments)     Pt not sure of reaction    Sulfa (Sulfonamide Antibiotics) Nausea and Vomiting        Review of Systems:  I reviewed the rest of organ systems personally and they were negative signed by Dr. Durham Friday    Objective:     Visit Vitals  /77 (BP 1 Location: Left arm, BP Patient Position: Sitting)   Pulse 86   Temp 97.8 °F (36.6 °C) (Temporal)   Resp 18   Ht 5' 11\" (1.803 m)   Wt (!) 364 lb 9.6 oz (165.4 kg)   SpO2 98% Comment: on 3L NC   BMI 50.85 kg/m²     VITAL SIGNS REVIEWED. Physical Exam:  Patient is well-nourished pleasant in conversation is appropriate. Head and neck examination atraumatic, normocephalic. Gaze appropriate. Conversation appropriate. Neck examination shows supple. No mass. No obvious carotid bruit. Chest examination shows lungs are clear bilaterally well-expanded, no crackles or wheezes. Cardiovascular system regular rate, no obvious murmur. Skin warm to touch  and moist, no skin lesions. Abdomen is soft ,not tender or distended bowel sounds present. No palpable mass. Neurological examinations, no focal neuro deficits moving all 4 extremities. Cranial nerves intact. Sensation is intact as well. Hematologic: No obvious bruise or swelling or obvious lymphadenopathy. Psychosocial: Appropriate. Has good effect. Musculoskeletal system: No muscle wasting, appropriate movements upper and lower extremity. Vascular examination: I examined the patient's left leg wounds much better drainage on the wounds are improved as well cellulitis seems to be better. I applied multiple wraps on the left leg with a Kerlix Ace wraps and Aquacel Ag. Data Review:   No visits with results within 1 Month(s) from this visit.    Latest known visit with results is:   No results displayed because visit has over 200 results. Assessment:     Problem List Items Addressed This Visit        Integumentary    Venous ulcer ([facility] [address] - Primary              Plan:     Patient will follow up next 2 weeks. Meanwhile patient will be provided wound care with the home nurse 3 times weekly. Patient needs to finish oral antibiotics as prescribed.         Jazzmine Calderon MD

## 2021-10-11 ENCOUNTER — OFFICE VISIT (OUTPATIENT)
Dept: SURGERY | Age: 66
End: 2021-10-11
Payer: MEDICARE

## 2021-10-11 VITALS
TEMPERATURE: 97.8 F | WEIGHT: 315 LBS | BODY MASS INDEX: 44.1 KG/M2 | RESPIRATION RATE: 18 BRPM | SYSTOLIC BLOOD PRESSURE: 111 MMHG | HEART RATE: 86 BPM | OXYGEN SATURATION: 97 % | DIASTOLIC BLOOD PRESSURE: 70 MMHG | HEIGHT: 71 IN

## 2021-10-11 DIAGNOSIS — L97.929 IDIOPATHIC CHRONIC VENOUS HYPERTENSION OF LEFT LOWER EXTREMITY WITH ULCER (HCC): Primary | ICD-10-CM

## 2021-10-11 DIAGNOSIS — I87.312 IDIOPATHIC CHRONIC VENOUS HYPERTENSION OF LEFT LOWER EXTREMITY WITH ULCER (HCC): Primary | ICD-10-CM

## 2021-10-11 DIAGNOSIS — I83.009 VENOUS ULCER (HCC): ICD-10-CM

## 2021-10-11 DIAGNOSIS — L97.909 VENOUS ULCER (HCC): ICD-10-CM

## 2021-10-11 PROCEDURE — 3017F COLORECTAL CA SCREEN DOC REV: CPT | Performed by: SURGERY

## 2021-10-11 PROCEDURE — 1101F PT FALLS ASSESS-DOCD LE1/YR: CPT | Performed by: SURGERY

## 2021-10-11 PROCEDURE — G8536 NO DOC ELDER MAL SCRN: HCPCS | Performed by: SURGERY

## 2021-10-11 PROCEDURE — 29581 APPL MULTLAYER CMPRN SYS LEG: CPT | Performed by: SURGERY

## 2021-10-11 PROCEDURE — G8427 DOCREV CUR MEDS BY ELIG CLIN: HCPCS | Performed by: SURGERY

## 2021-10-11 PROCEDURE — 99213 OFFICE O/P EST LOW 20 MIN: CPT | Performed by: SURGERY

## 2021-10-11 PROCEDURE — G8417 CALC BMI ABV UP PARAM F/U: HCPCS | Performed by: SURGERY

## 2021-10-11 PROCEDURE — G8510 SCR DEP NEG, NO PLAN REQD: HCPCS | Performed by: SURGERY

## 2021-10-11 NOTE — PROGRESS NOTES
Chief Complaint   Patient presents with    Follow-up     Left Leg      Visit Vitals  /70 (BP 1 Location: Left arm, BP Patient Position: Sitting)   Pulse 86   Temp 97.8 °F (36.6 °C) (Temporal)   Resp 18   Ht 5' 11\" (1.803 m)   Wt (!) 369 lb (167.4 kg)   SpO2 97% Comment: on 3L nc   BMI 51.47 kg/m²     1. Have you been to the ER, urgent care clinic since your last visit? Hospitalized since your last visit? No    2. Have you seen or consulted any other health care providers outside of the [address] [place] [address] since your last visit? Include any pap smears or colon screening.  No

## 2021-10-14 NOTE — PROGRESS NOTES
VASCULAR FOLLOW UP      Subjective:   CHIEF COMPLAINTS:  Venous disease with ulcer. PRESENTATION OF ILLNESS:  Mr. Vinh Macedo is 2 weeks follow-up. Patient's right leg venous stasis ulcer is closed. Patient still have the left leg ulcer on the dorsum of the left foot. We are currently doing a modified compression wraps with Aquacel Ag. Past Medical History:   Diagnosis Date    Arthritis     CAD (coronary artery disease)     Chronic obstructive pulmonary disease ([address]     Diabetes ([facility] [facility]     Gout     Hypertension     Ill-defined condition     Sleep apnea       Past Surgical History:   Procedure Laterality Date    HX ORTHOPAEDIC      HX PACEMAKER      HX VEIN ABLATION ADHESIVE       Family History   Problem Relation Age of Onset    Heart Disease Mother     Kidney Disease Mother     Hypertension Mother     Diabetes Mother     Heart Disease Father     Hypertension Father     Diabetes Sister     Diabetes Brother       Social History     Tobacco Use    Smoking status: Never Smoker    Smokeless tobacco: Never Used   Substance Use Topics    Alcohol use: Not Currently       Prior to Admission medications    Medication Sig Start Date End Date Taking? Authorizing Provider   metoprolol succinate (TOPROL-XL) 25 mg XL tablet TAKE 1 TABLET BY MOUTH ONCE DAILY 8/9/21  Yes Provider, Historical   Entresto 24-26 mg tablet Take 2 Tablets by mouth. 8/10/21  Yes Provider, Historical   cloNIDine HCL (CATAPRES) 0.1 mg tablet Take 1 Tablet by mouth every twelve (12) hours. 5/27/21  Yes Provider, Historical   ammonium lactate (AMLACTIN) 12 % topical cream Apply  to affected area as needed for Other (xerosis). rub in to affected area well  Indications: dry skin 6/8/21  Yes Mohiuddin, Romana Dallas, MD   vitamin E, dl,tocopheryl acet, (vitamin E acetate) 400 unit capsule Take 2 Capsules by mouth two (2) times a day.  6/8/21  Yes Mohiuddin, Romana Dallas, MD   ascorbic acid, vitamin C, (Vitamin C) 500 mg tablet Take 1,000 mg by mouth two (2) times a day. Indications: inadequate vitamin C   Yes Provider, Historical   tamsulosin (Flomax) 0.4 mg capsule Take 0.4 mg by mouth daily. Yes Provider, Historical   tiotropium (Spiriva with HandiHaler) 18 mcg inhalation capsule Take 1 Capsule by inhalation daily. Yes Provider, Historical   bumetanide (BUMEX) 2 mg tablet Take 2 mg by mouth two (2) times a day. Yes Provider, Historical   vitamin e (E GEMS) 1,000 unit capsule Take 1,000 Units by mouth two (2) times a day. Yes Provider, Historical   multivitamin (ONE A DAY) tablet Take 1 Tablet by mouth daily. Yes Provider, Historical   ferrous sulfate (Iron) 325 mg (65 mg iron) tablet Take  by mouth Daily (before breakfast). Yes Provider, Historical   aspirin 81 mg chewable tablet Take 81 mg by mouth daily. Yes Provider, Historical   flunisolide (NASAREL) 25 mcg (0.025 %) spry 2 Sprays two (2) times a day. Yes Provider, Historical   albuterol (ProAir HFA) 90 mcg/actuation inhaler Take  by inhalation. Yes Provider, Historical   colchicine 0.6 mg tablet Take 0.6 mg by mouth daily. Yes Provider, Historical   gabapentin (NEURONTIN) 300 mg capsule Take 300 mg by mouth four (4) times daily. Yes Provider, Historical   oxyCODONE IR (ROXICODONE) 5 mg immediate release tablet Take 5 mg by mouth every twelve (12) hours. Yes Provider, Historical   metFORMIN (GLUCOPHAGE) 500 mg tablet Take 1,000 mg by mouth two (2) times daily (with meals). Yes Provider, Historical   insulin lispro (HumaLOG U-100 Insulin) 100 unit/mL injection by SubCUTAneous route. Sliding scale   Yes Provider, Historical   insulin glargine (LANTUS) 100 unit/mL injection by SubCUTAneous route nightly.  80units am/ 32units pm   Yes Provider, Historical     Allergies   Allergen Reactions    Elavil Angioedema    Naproxen Unknown (comments)     Pt not sure of reaction    Sulfa (Sulfonamide Antibiotics) Nausea and Vomiting        Review of Systems:  I reviewed the rest of organ systems personally and they were negative signed by Dr. Benji Vallejo    Objective:     Visit Vitals  /70 (BP 1 Location: Left arm, BP Patient Position: Sitting)   Pulse 86   Temp 97.8 °F (36.6 °C) (Temporal)   Resp 18   Ht 5' 11\" (1.803 m)   Wt (!) 369 lb (167.4 kg)   SpO2 97% Comment: on 3L nc   BMI 51.47 kg/m²     VITAL SIGNS REVIEWED. Physical Exam:  Patient is well-nourished pleasant in conversation is appropriate. Head and neck examination atraumatic, normocephalic. Gaze appropriate. Conversation appropriate. Neck examination shows supple. No mass. No obvious carotid bruit. Chest examination shows lungs are clear bilaterally well-expanded, no crackles or wheezes. Cardiovascular system regular rate, no obvious murmur. Skin warm to touch  and moist, no skin lesions. Abdomen is soft ,not tender or distended bowel sounds present. No palpable mass. Neurological examinations, no focal neuro deficits moving all 4 extremities. Cranial nerves intact. Sensation is intact as well. Hematologic: No obvious bruise or swelling or obvious lymphadenopathy. Psychosocial: Appropriate. Has good effect. Musculoskeletal system: No muscle wasting, appropriate movements upper and lower extremity. Vascular examination: I examined the wounds wounds are fairly clean and smaller. I applied wounds with Aquacel Ag and the multilayer compression wraps. Data Review:   No visits with results within 1 Month(s) from this visit. Latest known visit with results is:   No results displayed because visit has over 200 results. Assessment:     Problem List Items Addressed This Visit        Circulatory    Idiopathic chronic venous hypertension of left lower extremity with ulcer ([address] - Primary       Integumentary    Venous ulcer ([address]              Plan: We will resume Profore dressing weekly. I instructed the patient have the nurse change the dressing 3 times weekly with a Profore dressing.   And I will reevaluate her again 2 weeks follow-up.         Holly Jacobs MD

## 2021-10-25 ENCOUNTER — OFFICE VISIT (OUTPATIENT)
Dept: SURGERY | Age: 66
End: 2021-10-25
Payer: MEDICARE

## 2021-10-25 VITALS
DIASTOLIC BLOOD PRESSURE: 76 MMHG | HEIGHT: 71 IN | HEART RATE: 93 BPM | WEIGHT: 315 LBS | SYSTOLIC BLOOD PRESSURE: 116 MMHG | TEMPERATURE: 97.8 F | BODY MASS INDEX: 44.1 KG/M2 | OXYGEN SATURATION: 98 %

## 2021-10-25 DIAGNOSIS — L97.909 VENOUS ULCER (HCC): Primary | ICD-10-CM

## 2021-10-25 DIAGNOSIS — I83.009 VENOUS ULCER (HCC): Primary | ICD-10-CM

## 2021-10-25 PROCEDURE — G8417 CALC BMI ABV UP PARAM F/U: HCPCS | Performed by: SURGERY

## 2021-10-25 PROCEDURE — 1101F PT FALLS ASSESS-DOCD LE1/YR: CPT | Performed by: SURGERY

## 2021-10-25 PROCEDURE — 99213 OFFICE O/P EST LOW 20 MIN: CPT | Performed by: SURGERY

## 2021-10-25 PROCEDURE — G8536 NO DOC ELDER MAL SCRN: HCPCS | Performed by: SURGERY

## 2021-10-25 PROCEDURE — 3017F COLORECTAL CA SCREEN DOC REV: CPT | Performed by: SURGERY

## 2021-10-25 PROCEDURE — G8432 DEP SCR NOT DOC, RNG: HCPCS | Performed by: SURGERY

## 2021-10-25 PROCEDURE — 29581 APPL MULTLAYER CMPRN SYS LEG: CPT | Performed by: SURGERY

## 2021-10-25 PROCEDURE — G8427 DOCREV CUR MEDS BY ELIG CLIN: HCPCS | Performed by: SURGERY

## 2021-10-25 NOTE — PROGRESS NOTES
Chief Complaint   Patient presents with    Follow-up     2 weeks - check legs     Visit Vitals  /76 (BP 1 Location: Left arm, BP Patient Position: Sitting, BP Cuff Size: Adult)   Pulse 93   Temp 97.8 °F (36.6 °C) (Temporal)   Ht 5' 11\" (1.803 m)   Wt (!) 371 lb (168.3 kg)   SpO2 98% Comment: O2 at 3LPM via NC   BMI 51.74 kg/m²

## 2021-10-28 NOTE — PROGRESS NOTES
VASCULAR FOLLOW UP      Subjective:   CHIEF COMPLAINTS:  Left leg wound  PRESENTATION OF ILLNESS:  Dionte Cotton is a 76 y.o. very pleasant man here today 2 weeks follow-up for dressing changes on the left leg. Patient has a venous stasis ulcer. Right leg wounds healed. Patient has a home nurse doing dressing change 3 times weekly. Past Medical History:   Diagnosis Date    Arthritis     CAD (coronary artery disease)     Chronic obstructive pulmonary disease ([address]     [facility] [address]     Gout     Hypertension     Ill-defined condition     Sleep apnea       Past Surgical History:   Procedure Laterality Date    HX ORTHOPAEDIC      HX PACEMAKER      HX VEIN ABLATION ADHESIVE       Family History   Problem Relation Age of Onset    Heart Disease Mother     Kidney Disease Mother     Hypertension Mother     Diabetes Mother     Heart Disease Father     Hypertension Father     Diabetes Sister     Diabetes Brother       Social History     Tobacco Use    Smoking status: Never Smoker    Smokeless tobacco: Never Used   Substance Use Topics    Alcohol use: Not Currently       Prior to Admission medications    Medication Sig Start Date End Date Taking? Authorizing Provider   metoprolol succinate (TOPROL-XL) 25 mg XL tablet TAKE 1 TABLET BY MOUTH ONCE DAILY 8/9/21  Yes Provider, Historical   Entresto 24-26 mg tablet Take 2 Tablets by mouth. 8/10/21  Yes Provider, Historical   cloNIDine HCL (CATAPRES) 0.1 mg tablet Take 1 Tablet by mouth every twelve (12) hours. 5/27/21  Yes Provider, Historical   ammonium lactate (AMLACTIN) 12 % topical cream Apply  to affected area as needed for Other (xerosis). rub in to affected area well  Indications: dry skin 6/8/21  Yes Camryn Patel MD   vitamin E, dl,tocopheryl acet, (vitamin E acetate) 400 unit capsule Take 2 Capsules by mouth two (2) times a day.  6/8/21  Yes Camryn Patel MD   ascorbic acid, vitamin C, (Vitamin C) 500 mg tablet Take 1,000 mg by mouth two (2) times a day. Indications: inadequate vitamin C   Yes Provider, Historical   tamsulosin (Flomax) 0.4 mg capsule Take 0.4 mg by mouth daily. Yes Provider, Historical   tiotropium (Spiriva with HandiHaler) 18 mcg inhalation capsule Take 1 Capsule by inhalation daily. Yes Provider, Historical   bumetanide (BUMEX) 2 mg tablet Take 2 mg by mouth two (2) times a day. Yes Provider, Historical   vitamin e (E GEMS) 1,000 unit capsule Take 1,000 Units by mouth two (2) times a day. Yes Provider, Historical   multivitamin (ONE A DAY) tablet Take 1 Tablet by mouth daily. Yes Provider, Historical   ferrous sulfate (Iron) 325 mg (65 mg iron) tablet Take  by mouth Daily (before breakfast). Yes Provider, Historical   aspirin 81 mg chewable tablet Take 81 mg by mouth daily. Yes Provider, Historical   flunisolide (NASAREL) 25 mcg (0.025 %) spry 2 Sprays two (2) times a day. Yes Provider, Historical   albuterol (ProAir HFA) 90 mcg/actuation inhaler Take  by inhalation. Yes Provider, Historical   colchicine 0.6 mg tablet Take 0.6 mg by mouth daily. Yes Provider, Historical   gabapentin (NEURONTIN) 300 mg capsule Take 300 mg by mouth four (4) times daily. Yes Provider, Historical   oxyCODONE IR (ROXICODONE) 5 mg immediate release tablet Take 5 mg by mouth every twelve (12) hours. Yes Provider, Historical   metFORMIN (GLUCOPHAGE) 500 mg tablet Take 1,000 mg by mouth two (2) times daily (with meals). Yes Provider, Historical   insulin lispro (HumaLOG U-100 Insulin) 100 unit/mL injection by SubCUTAneous route. Sliding scale   Yes Provider, Historical   insulin glargine (LANTUS) 100 unit/mL injection by SubCUTAneous route nightly.  80units am/ 32units pm   Yes Provider, Historical     Allergies   Allergen Reactions    Elavil Angioedema    Naproxen Unknown (comments)     Pt not sure of reaction    Sulfa (Sulfonamide Antibiotics) Nausea and Vomiting        Review of Systems:  I reviewed the rest of organ systems personally and they were negative signed by Dr. Collado     Objective:     Visit Vitals  /76 (BP 1 Location: Left arm, BP Patient Position: Sitting, BP Cuff Size: Adult)   Pulse 93   Temp 97.8 °F (36.6 °C) (Temporal)   Ht 5' 11\" (1.803 m)   Wt (!) 371 lb (168.3 kg)   SpO2 98% Comment: O2 at 3LPM via NC   BMI 51.74 kg/m²     VITAL SIGNS REVIEWED. Physical Exam:  Patient is well-nourished pleasant in conversation is appropriate. Head and neck examination atraumatic, normocephalic. Gaze appropriate. Conversation appropriate. Neck examination shows supple. No mass. No obvious carotid bruit. Chest examination shows lungs are clear bilaterally well-expanded, no crackles or wheezes. Cardiovascular system regular rate, no obvious murmur. Skin warm to touch  and moist, no skin lesions. Abdomen is soft ,not tender or distended bowel sounds present. No palpable mass. Neurological examinations, no focal neuro deficits moving all 4 extremities. Cranial nerves intact. Sensation is intact as well. Hematologic: No obvious bruise or swelling or obvious lymphadenopathy. Psychosocial: Appropriate. Has good effect. Musculoskeletal system: No muscle wasting, appropriate movements upper and lower extremity. Vascular examination: Dorsum of the foot on the left leg has shrunk in size of the wound to 50%. I applied multilayer compression wraps with Aquacel Ag patient tolerated procedure well without any problem. Data Review:   No visits with results within 1 Month(s) from this visit. Latest known visit with results is:   No results displayed because visit has over 200 results. Assessment:     Problem List Items Addressed This Visit        Integumentary    Venous ulcer ([address] - Primary              Plan:     Patient was advised to apply Profore dressing when the next visit home nurse doing dressing changes. And I will reassess him again 2 weeks follow-up.         Susannah Lindo MD

## 2021-11-08 ENCOUNTER — OFFICE VISIT (OUTPATIENT)
Dept: SURGERY | Age: 66
End: 2021-11-08
Payer: MEDICARE

## 2021-11-08 VITALS
SYSTOLIC BLOOD PRESSURE: 126 MMHG | OXYGEN SATURATION: 96 % | WEIGHT: 315 LBS | DIASTOLIC BLOOD PRESSURE: 84 MMHG | HEIGHT: 71 IN | BODY MASS INDEX: 44.1 KG/M2 | HEART RATE: 96 BPM | TEMPERATURE: 97.3 F

## 2021-11-08 DIAGNOSIS — E11.42 TYPE 2 DIABETES MELLITUS WITH DIABETIC POLYNEUROPATHY, WITH LONG-TERM CURRENT USE OF INSULIN (HCC): ICD-10-CM

## 2021-11-08 DIAGNOSIS — I87.312 IDIOPATHIC CHRONIC VENOUS HYPERTENSION OF LEFT LOWER EXTREMITY WITH ULCER (HCC): Primary | ICD-10-CM

## 2021-11-08 DIAGNOSIS — L97.909 VENOUS ULCER (HCC): ICD-10-CM

## 2021-11-08 DIAGNOSIS — Z79.4 TYPE 2 DIABETES MELLITUS WITH DIABETIC POLYNEUROPATHY, WITH LONG-TERM CURRENT USE OF INSULIN (HCC): ICD-10-CM

## 2021-11-08 DIAGNOSIS — I83.009 VENOUS ULCER (HCC): ICD-10-CM

## 2021-11-08 DIAGNOSIS — L97.929 IDIOPATHIC CHRONIC VENOUS HYPERTENSION OF LEFT LOWER EXTREMITY WITH ULCER (HCC): Primary | ICD-10-CM

## 2021-11-08 PROCEDURE — G8427 DOCREV CUR MEDS BY ELIG CLIN: HCPCS | Performed by: SURGERY

## 2021-11-08 PROCEDURE — 2022F DILAT RTA XM EVC RTNOPTHY: CPT | Performed by: SURGERY

## 2021-11-08 PROCEDURE — 3044F HG A1C LEVEL LT 7.0%: CPT | Performed by: SURGERY

## 2021-11-08 PROCEDURE — G8432 DEP SCR NOT DOC, RNG: HCPCS | Performed by: SURGERY

## 2021-11-08 PROCEDURE — 99213 OFFICE O/P EST LOW 20 MIN: CPT | Performed by: SURGERY

## 2021-11-08 PROCEDURE — 1101F PT FALLS ASSESS-DOCD LE1/YR: CPT | Performed by: SURGERY

## 2021-11-08 PROCEDURE — G8417 CALC BMI ABV UP PARAM F/U: HCPCS | Performed by: SURGERY

## 2021-11-08 PROCEDURE — G8536 NO DOC ELDER MAL SCRN: HCPCS | Performed by: SURGERY

## 2021-11-08 PROCEDURE — 3017F COLORECTAL CA SCREEN DOC REV: CPT | Performed by: SURGERY

## 2021-11-08 NOTE — PROGRESS NOTES
Chief Complaint   Patient presents with    Follow-up     2 week - wound check     Visit Vitals  /84 (BP 1 Location: Left arm, BP Patient Position: Sitting, BP Cuff Size: Adult)   Pulse 96   Temp 97.3 °F (36.3 °C) (Temporal)   Ht 5' 11\" (1.803 m)   Wt (!) 371 lb 12.8 oz (168.6 kg)   SpO2 96% Comment: O2 at 3 LPM via NC   BMI 51.86 kg/m²

## 2021-11-09 NOTE — PROGRESS NOTES
VASCULAR FOLLOW UP      Subjective:   CHIEF COMPLAINTS:  Left foot venous stasis ulcer  PRESENTATION OF ILLNESS:  Devora Beal is a 78 y.o. very pleasant man here today follow-up 2 weeks for wound examination left foot secondary to venous stasis. Patient is doing well. Patient was told the wounds are completely closed. Patient has no fever chills. Past Medical History:   Diagnosis Date    Arthritis     CAD (coronary artery disease)     [facility] obstructive [facility] disease ([facility] [address] [facility]     Diabetes ([facility] [address]     Gout     Hypertension     Ill-defined condition     Sleep apnea       Past Surgical History:   Procedure Laterality Date    HX ORTHOPAEDIC      HX PACEMAKER      HX VEIN ABLATION ADHESIVE       Family History   Problem Relation Age of Onset    Heart Disease Mother     Kidney Disease Mother     Hypertension Mother     Diabetes Mother     Heart Disease Father     Hypertension Father     Diabetes Sister     Diabetes Brother       Social History     Tobacco Use    Smoking status: Never Smoker    Smokeless tobacco: Never Used   Substance Use Topics    Alcohol use: Not Currently       Prior to Admission medications    Medication Sig Start Date End Date Taking? Authorizing Provider   metoprolol succinate (TOPROL-XL) 25 mg XL tablet TAKE 1 TABLET BY MOUTH ONCE DAILY 8/9/21  Yes Provider, Historical   Entresto 24-26 mg tablet Take 2 Tablets by mouth. 8/10/21  Yes Provider, Historical   cloNIDine HCL (CATAPRES) 0.1 mg tablet Take 1 Tablet by mouth every twelve (12) hours. 5/27/21  Yes Provider, Historical   ammonium lactate (AMLACTIN) 12 % topical cream Apply  to affected area as needed for Other (xerosis). rub in to affected area well  Indications: dry skin 6/8/21  Yes Alyssia Patel MD   vitamin E, dl,tocopheryl acet, (vitamin E acetate) 400 unit capsule Take 2 Capsules by mouth two (2) times a day.  6/8/21  Yes Alyssia Patel MD   ascorbic acid, vitamin C, (Vitamin C) 500 mg tablet Take 1,000 mg by mouth two (2) times a day. Indications: inadequate vitamin C   Yes Provider, Historical   tamsulosin (Flomax) 0.4 mg capsule Take 0.4 mg by mouth daily. Yes Provider, Historical   tiotropium (Spiriva with HandiHaler) 18 mcg inhalation capsule Take 1 Capsule by inhalation daily. Yes Provider, Historical   bumetanide (BUMEX) 2 mg tablet Take 2 mg by mouth two (2) times a day. Yes Provider, Historical   vitamin e (E GEMS) 1,000 unit capsule Take 1,000 Units by mouth two (2) times a day. Yes Provider, Historical   multivitamin (ONE A DAY) tablet Take 1 Tablet by mouth daily. Yes Provider, Historical   ferrous sulfate (Iron) 325 mg (65 mg iron) tablet Take  by mouth Daily (before breakfast). Yes Provider, Historical   aspirin 81 mg chewable tablet Take 81 mg by mouth daily. Yes Provider, Historical   flunisolide (NASAREL) 25 mcg (0.025 %) spry 2 Sprays two (2) times a day. Yes Provider, Historical   albuterol (ProAir HFA) 90 mcg/actuation inhaler Take  by inhalation. Yes Provider, Historical   colchicine 0.6 mg tablet Take 0.6 mg by mouth daily. Yes Provider, Historical   gabapentin (NEURONTIN) 300 mg capsule Take 300 mg by mouth four (4) times daily. Yes Provider, Historical   oxyCODONE IR (ROXICODONE) 5 mg immediate release tablet Take 5 mg by mouth every twelve (12) hours. Yes Provider, Historical   metFORMIN (GLUCOPHAGE) 500 mg tablet Take 1,000 mg by mouth two (2) times daily (with meals). Yes Provider, Historical   insulin lispro (HumaLOG U-100 Insulin) 100 unit/mL injection by SubCUTAneous route. Sliding scale   Yes Provider, Historical   insulin glargine (LANTUS) 100 unit/mL injection by SubCUTAneous route nightly.  80units am/ 32units pm   Yes Provider, Historical     Allergies   Allergen Reactions    Elavil Angioedema    Naproxen Unknown (comments)     Pt not sure of reaction    Sulfa (Sulfonamide Antibiotics) Nausea and Vomiting        Review of Systems:  I reviewed the rest of organ systems personally and they were negative signed by Dr. Renee Valdivia    Objective:     Visit Vitals  /84 (BP 1 Location: Left arm, BP Patient Position: Sitting, BP Cuff Size: Adult)   Pulse 96   Temp 97.3 °F (36.3 °C) (Temporal)   Ht 5' 11\" (1.803 m)   Wt (!) 371 lb 12.8 oz (168.6 kg)   SpO2 96% Comment: O2 at 3 LPM via NC   BMI 51.86 kg/m²     VITAL SIGNS REVIEWED. Physical Exam:  Patient is well-nourished pleasant in conversation is appropriate. Head and neck examination atraumatic, normocephalic. Gaze appropriate. Conversation appropriate. Neck examination shows supple. No mass. No obvious carotid bruit. Chest examination shows lungs are clear bilaterally well-expanded, no crackles or wheezes. Cardiovascular system regular rate, no obvious murmur. Skin warm to touch  and moist, no skin lesions. Abdomen is soft ,not tender or distended bowel sounds present. No palpable mass. Neurological examinations, no focal neuro deficits moving all 4 extremities. Cranial nerves intact. Sensation is intact as well. Hematologic: No obvious bruise or swelling or obvious lymphadenopathy. Psychosocial: Appropriate. Has good effect. Musculoskeletal system: No muscle wasting, appropriate movements upper and lower extremity. Vascular examination: I examined the patient left foot. Dorsum of the foot wound about a centimeter that was open last time was closed with scabs. I reapplied wound with Aquacel Ag and Ace wraps. Patient was advised        Data Review:   No visits with results within 1 Month(s) from this visit. Latest known visit with results is:   No results displayed because visit has over 200 results.            Assessment:     Problem List Items Addressed This Visit        Circulatory    Idiopathic chronic venous hypertension of left lower extremity with ulcer ([facility] [address] - Primary       Endocrine    Type 2 diabetes mellitus with diabetic polyneuropathy, with long-term current use of insulin ([facility] [address]       Integumentary    Venous ulcer ([facility] [address]              Plan:     Continue another wound care with Aquacel Ag and Ace wraps for another week and then discontinue then followed by he has options where custom made compression stocking or wear Ace wraps daily basis until he gets new stocking. And patient will be reevaluated 4 weeks follow-up. Patient was advised if he has recurrent drainage and open ulcer that he needs to call me then will need to go back to alternate type of dressing changes.         Marifer Gautam MD

## 2021-11-23 ENCOUNTER — HOSPITAL ENCOUNTER (OUTPATIENT)
Dept: PHYSICAL THERAPY | Age: 66
Discharge: HOME | End: 2021-11-23
Payer: MEDICARE

## 2021-11-23 PROCEDURE — 97162 PT EVAL MOD COMPLEX 30 MIN: CPT

## 2021-11-23 PROCEDURE — 97140 MANUAL THERAPY 1/> REGIONS: CPT

## 2021-11-23 NOTE — PROGRESS NOTES
[facility]  Lymphedema Clinic  [address] [facility], [address], [address]    INITIAL EVALUATION    NAME: Jeannette Nixon AGE: 78 y.o. GENDER: male  DATE: 11/23/2021  REFERRING PHYSICIAN: Elaine Mead MD  HISTORY AND BACKGROUND:   Primary Diagnosis:  · Lymphedema, not elsewhere classified B LE (I89.0)  Other Treatment Diagnoses:   Abnormality of gait (R26.9)   Swelling not relieved by elevation (R60.9)   Morbid obesity (E66.01)   Diabetic condition (E11.69)   Idiopathic chronic venous hypertension of L LE with ulcer (I87.312, L97.929)  Date of Onset: Patient reports initial date of onset was 20 years ago. Present Symptoms and Functional Limitations: Patient arrives to the visit today approximately 30 minutes late and is accompanied by his wife. He ambulates short community distances with a rollator. Patient wears oxygen via nasal cannula 3 l/min and was transferred to an oxygen tank in our clinic to conserve his portable tank. Patient reports having swelling in the legs for at least 20 years. He has a history of multiple cellulitis infections with recent hospitalization June 2021. He has struggled with venous ulcers for years and reports having chronic wounds on the legs for the past year. He was treated in a [address] [place] [address] and received home health services and the wounds finally healed one week ago. Patient's wife assists with daily skin care and bandages the legs with ace wraps as instructed in the [address] [place] [address]. Patient was referred to our clinic by the Vascular Surgeon, Dr. Mike Flores, for assessment of swelling and to secure compression products to reduce the risk of infection and wounds. Lymphedema Life Impact Scale (LLIS): patient scores a 57 with 84% impairment.    Past Medical History:   Past Medical History:   Diagnosis Date    Arthritis     CAD (coronary artery disease)     [facility] obstructive pulmonary disease ([facility] [address]     Diabetes ([facility] [address]     Gout     Hypertension     Ill-defined condition     Sleep apnea      Past Surgical History:   Procedure Laterality Date    HX ORTHOPAEDIC      HX PACEMAKER      HX VEIN ABLATION ADHESIVE       Current Medications:    Current Outpatient Medications   Medication Sig    metoprolol succinate (TOPROL-XL) 25 mg XL tablet TAKE 1 TABLET BY MOUTH ONCE DAILY    Entresto 24-26 mg tablet Take 2 Tablets by mouth.  cloNIDine HCL (CATAPRES) 0.1 mg tablet Take 1 Tablet by mouth every twelve (12) hours.  ammonium lactate (AMLACTIN) 12 % topical cream Apply  to affected area as needed for Other (xerosis). rub in to affected area well  Indications: dry skin    vitamin E, dl,tocopheryl acet, (vitamin E acetate) 400 unit capsule Take 2 Capsules by mouth two (2) times a day.  ascorbic acid, vitamin C, (Vitamin C) 500 mg tablet Take 1,000 mg by mouth two (2) times a day. Indications: inadequate vitamin C    tamsulosin (Flomax) 0.4 mg capsule Take 0.4 mg by mouth daily.  tiotropium (Spiriva with HandiHaler) 18 mcg inhalation capsule Take 1 Capsule by inhalation daily.  bumetanide (BUMEX) 2 mg tablet Take 2 mg by mouth two (2) times a day.  vitamin e (E GEMS) 1,000 unit capsule Take 1,000 Units by mouth two (2) times a day.  multivitamin (ONE A DAY) tablet Take 1 Tablet by mouth daily.  ferrous sulfate (Iron) 325 mg (65 mg iron) tablet Take  by mouth Daily (before breakfast).  aspirin 81 mg chewable tablet Take 81 mg by mouth daily.  flunisolide (NASAREL) 25 mcg (0.025 %) spry 2 Sprays two (2) times a day.  albuterol (ProAir HFA) 90 mcg/actuation inhaler Take  by inhalation.  colchicine 0.6 mg tablet Take 0.6 mg by mouth daily.  gabapentin (NEURONTIN) 300 mg capsule Take 300 mg by mouth four (4) times daily.  oxyCODONE IR (ROXICODONE) 5 mg immediate release tablet Take 5 mg by mouth every twelve (12) hours.     metFORMIN (GLUCOPHAGE) 500 mg tablet Take 1,000 mg by mouth two (2) times daily (with meals).  insulin lispro (HumaLOG U-100 Insulin) 100 unit/mL injection by SubCUTAneous route. Sliding scale    insulin glargine (LANTUS) 100 unit/mL injection by SubCUTAneous route nightly. 80units am/ 32units pm     No current facility-administered medications for this encounter. Allergies: Allergies   Allergen Reactions    Elavil Angioedema    Naproxen Unknown (comments)     Pt not sure of reaction    Sulfa (Sulfonamide Antibiotics) Nausea and Vomiting        Prior Level of Function/Social/Work History/Personal factors and/or comorbidities impacting plan of care: Patient was on disability following a back injury and has not worked in 30 years. He is sedentary. Co-morbidities: pacemaker, CAD, CHF, DM, COPD, venous insufficiency with wounds, and HTN. Living Situation: Lives with his wife in a single story dwelling with a ramp to enter. Trainable Caregiver?: Yes - wife and she is at the appointment today. Mobility: Modified independent with rollator for short community distances. No assistive device in the house. Sleeping Arrangement:  in bed at night  Adaptive Equipment Owned: cane, rollator, oxygen, adaptive dressing equipment. Other: Patient has never worn compression products. Previous Therapy:  Treatment for wounds - [address] [facility] and home health services. Compression/Lymphedema Equipment: ace wraps, kerlix, wound care supplies    SUBJECTIVE:   Patient reports having chronic wounds and swelling for many years. His physician recommended that he obtain compression products for management of swelling. Patients goals for therapy: decrease swelling and to be measured for new compression garments. OBJECTIVE DATA SUMMARY:   EXAMINATION/PRESENTATION/DECISION MAKING:   Pain:  Pain Scale 1: Numeric (0 - 10)  Pain Intensity 1: 0    Self-Care and ADLs: Requires assistance for ADL's, including skin care and bandaging.      Skin and Tissue Assessment:  Dermal Status: Tenuous, Wound/lesion present L lower leg - see picture below, Dermatitis, Dry and Flaky    Texture/Consistency: Brawny/boggy upper legs and fibrotic/woody lower legs    Pigmentation/Color Change: Hyperpigmented and pink skin discoloration which is chronic per patient- see pictures below. Not warm to touch. Circulatory: DVT history - venous dopplers + for DVT June 2021    Nails: Fungus    Stemmers Sign: Positive dorsum of the feet bilaterally. L LE         R LE           Height:   Deferred due to time constraints  Weight:   deferred due to time constraints   BMI:   Deferred  (36 or greater: adversely affecting lymphedema)  Wound/Ulcer:  Open wound present L lower leg at the crease of ankle anteriorly. Approximately 1.5 cm in diameter. No odor or drainage noted. See picture below. Volumetric Measurements: LE volumes deferred due to time constraints. Will assess in a future visit. Girth measurements of the lower legs taken today for sizing of compression products. R MTPs: 28.8 cm  R ankle: 27.6 cm  R calf: 50.5 cm    L MTP's: 28.0 cm  L ankle: 27.2 cm  L calf: 49.0 cm    Range of Motion: generally decreased but functional in the legs and feet due to swelling and debility. Strength: General debility apparent. Patient requires minimal assistance for management of legs on and off the mat table and requires assistance with sit<> stand transfers from low surfaces. Sensation:  Impaired - decreased sensation reported in the legs and feet bilaterally. Able to feel pressure. Mobility:    Bed Mobility: Minimum assistance sit<>supine transfers   Transfers: Minimum assistance X 2 and rocking motion for sit>stand transfers  Gait: Modified independent with rollator for short community distances, no assistive device in the house. Endurance: Impaired - patient wears oxygen 3 l/min via nasal cannula. Reports endurance is worse ever since being hospitalized with COVID 19 virus. Safety:  Patient is alert and oriented: Yes  Safety awareness: appears intact  Fall risk?: Yes  Patient given written fall prevention handout: Yes     Based on the above components, the patient evaluation is determined to be of the following complexity level: MEDIUM    Evaluation Time: 2:15-2:45 pm    TREATMENT PROVIDED:   1. Treatment description:  Manual Therapy: Patient arrives to the clinic with ace wraps to the lower legs and feet. The ace wraps appeared too tight at the ankle and stopped mid calf. Patient was recently [facility] in a [facility] and then by home health services for venous ulcers on the lower legs. He was discharged with instructions to apply Ammonium Lactate ointment to the legs as well as a ace wrap multi-layer compression bandage every 3 days or so. The ace wraps were removed in the clinic and patient's skin was inspected. The skin is tenuous with an open wound present on the L lower leg anteriorly near the ankle. Patient's skin was washed with Aveeno Soap and dried thoroughly. Applied Remedy/Aquaphor lotion to the intact skin using manual lymphatic drainage principles and A and D ointment to the open area. Applied multi-layer compression bandaging: Tricofix, Cellona 10 cm and 15 cm, and 6 cm, 8 cm, 10 cm comprilan bandages to the lower legs and feet. Educated patient and wife in bandage application and provided written instructions for the technique as well as laundering instructions for the bandaging supplies. Discussed the short stretch component of the comprilan bandages and that compression is achieved with proper layering of bandages. Discussed bandage precautions, wear schedule, and instructed patient to remove bandaging with any shortness of breath, heart palpitations, or increased pain/numbness. Patient was referred to our clinic by Dr. Rachana Aquino, a Vascular Surgeon, in order to be measured and fitted with compression products.  Contacted the vendor, [employer], to check insurance benefits and patient's insurance will not cover the cost of compression products. Discussed that patient can pay a cash price for compression products but he voiced concern regarding financial limitations. Discussed that funds may be available to assist with garment payment and those will be researched by therapist. Initiated discussion on available compression products and patient may do best in a velcro product for the lower legs and feet due to the adjustablity of the product, tenuous skin on the legs, and having to rely on caregiver assistance for management of products. Measured patient today for [address] [place] [address] with foot pieces vs silver liner socks/compressive undersocks and the order will be submitted if patient is approved for funding assistance. Discussed the importance of elevating the legs throughout the day to limit the effect of gravity. Discussed the role and benefit of the muscle pump function for management of swelling and encouraged ankle pumps, ankle circles, toe crunches, and knee flexion and to avoid sitting for long periods at one time. Discussed standard lymphedema precautions to include avoiding blood pressure readings, injections and IVs or other procedures/acts that could lead to broken skin on affected area, and avoiding excessive heat, resistive activity or altitude without compression garment. Treatment time:  2:45-3:45 pm  Minutes: 61    ASSESSMENT:   Gabriel Frank is a 78 y.o. male who presents with B LE lymphedema, stage 2, with history of multiple venous ulcers and infections requiring hospitalization. Patient's condition is complicated by obesity, sedentary lifestyle, cardiac disease, pacemaker, and COPD. Patient relies on his wife to assist with skin care and ADL's as needed. Patient's legs and feet have been bandaged with ace wraps for the past several months and patient and wife were educated in multi-layer compression bandaging with comprilan bandages today. Patient is unable to attend frequent visits in the clinic due to impaired mobility, relying on caregiver for transportation, and the hour drive to and from the clinic. Patient is hoping to obtain compression products which will be easier to manage and reduce the risk of infection and wounds. Will attempt to find available funds to provide payment assistance for garments. This care is medically necessary due to the infection risk with lymphedema and to improve functional activities. CDT is necessary to resolve swelling to allow patient to return to wearing normal clothes/footwear and prevent worsening of symptoms, such as infections and/or hospitalizations. Patient will be independent with home program strategies to allow improved ADL ability and mobility and to allow patient to return to greatest functional independence. Rehabilitation potential is considered to be Fair. Factors which may influence rehabilitation potential include prolonged swelling, wounds, history of cellulitis infection, sedentary lifestyle, obesity, financial limitations, and reliance on caregiver. Patient will benefit from 3 to 6 physical therapy visits over 12 weeks to optimize improvement in these areas. PLAN OF CARE:   Recommendations and Planned Interventions: Manual lymph drainage/decongestive therapy, Multi-layer compression bandaging (short-stretch), Compression garment fitting/provision, Lymphedema therapeutic exercise, AROM/PROM/Strength/Coordination, Self-care training, Functional mobility training, Education in skin care and lymphedema precautions, Self-MLD education per home program, Self-bandaging education per home program, Caregiver education as needed and Would care as needed    GOALS  Short term goals  Time frame: to be met by 12/29/21  1. Patient will demonstrate knowledge of signs/symptoms of infections/cellulitis and be independent in skin care to prevent cellulitis.   2.  Patient will demonstrate independence in lymphedema home program of therapeutic exercises to improve circulation and decongest limb to improve ADLs. 3.  Patient will tolerate multi-layer bandages (MLB) and show measureable decrease in limb volume and/or participate in the selection process to allow ordering of home compression system (daytime, nighttime garments and pump as needed). Long term goals  Time frame: to be met by 2/18/22  1. Pt will show improvement in the Lymphedema Life Impact Scale (LLIS) by decreasing the score to 50 and thus allow improvement in patient's quality of life. 2.  Patient will be independent with don/doff of compression system and use in order to prevent reaccumulation of fluid at discharge. 3.  Pt will be independent in self-MLD and show stable limb volumes showing decongestion and pt. ready for transition to independent restorative phase of lymphedema therapy. Patient has participated in goal setting and agrees to work toward plan of care. Patient was instructed to call if questions or concerns arise. Thank you for this referral.  Dorys Dougherty, PT, CLT   Total timed codes: 60  1:1 Treatment time: 61    TREATMENT PLAN EFFECTIVE DATES:   11/23/2021 TO 2/18/2022  I have read the above plan of care for Barbara Watson. I certify the above prescribed services are required by this patient and are medically necessary.   The above plan of care has been developed in conjunction with the lymphedema/physical therapist.       Physician Signature: ____________________________________Date:______________     Christelle Lau MD

## 2021-11-24 NOTE — PROGRESS NOTES
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 []   I87.2 Venous Insufficiency   [x]   I89.0 Lymphedema, other secondary B LE []   I83.019 Venous Stasis Ulcer LE, Right   []   I89.9 Unspecified Lymphatic Disorder []   I83.029 Venous Stasis Ulcer LE, Left   [x]   R60.9 Swelling not relieved by elevation []   Q82.0 Hereditary/ Congenital Lymphedema   []   C50.211 Malignant neoplasm of breast, Right []   G89.3  Cancer associated pain   []   C50.212 Malignant neoplasm of breast, Left []   L03.115 LE Cellulitis, Right   [x]  I87.312, L97.929 Idiopathic chronic venous hypertension of L LE with ulcer []   L03.116 LE Cellulitis, Left                         Recommended Product for Diagnosis as noted above:  Units Upper Extremity Rt Lt Units Lower Extremity Rt Lt    Compression Multi-Layered Bandages   2 Compression Multi-Layered Bandages X X    Circ-Aid, Ready Wrap, Sigvaris Arm   2 Inelastic binders (Circ-Aid, Farrow)  [x]Foot   [x]Below Knee   []Knee   []Thigh X X    Circ-Aid Ready Wrap, Sigvaris hand    Brigido Saji, Leg, night use      Tribute Arm, night use   2 Tribute Leg, night use X X    Brigido Saji Arm, night use    Joel Sleeve Leg/ Foot, night use      Vasopneumatic Compression Pump  []Arm []Arm w/Shoulder  []Arm w/Vest    Vasopneumatic Compression Pump  []Leg         []Trunk       []Pant      Gradiant Compression Sleeves & Gloves  Custom/ RM Arm:  []CCL1 []CCL2 []CCL3  Custom/ RM Glove: []CCL1 []CCL2 []CCL3      Gradient Compression Stockings  Custom/ RM Lower Extremity:   []CCL1       []CCL2         []CCL3   []Knee      []Thigh        []Full Length      Joel sleeve arm w/ hand, night use    Tribute Wrap, night use      Compression Bra    Compression Vest          Compression Multi-Layered Bandages The above patient was referred for treatment of Lymphedema due to the diagnosis indicated above. Lymphedema is a progressive and chronic condition. It may cause acute infections, muscle atrophy, discomfort, and connective tissue fibrosis with irreversible tissue damage, decreased ROM in the affected limb and diminished functional mobility possibly interfering with independence and ability to work. Recurrent infections and wound complications that commonly occur with Lymphedema often require hospitalization and extensive wound care, thus increasing medical costs. The patient has received complete decongestive therapy which includes manual lymphatic drainage, lymphedema specific exercises, compression bandaging, and hygiene/skin care. Goals of therapy are to reduce the edema and prevent re-accumulation of fluid with its complications. It is medically necessary for this patient to have daytime garments to control this condition. They will need 2 sets (one set to wear and one set to wash for adequate skin care and wearing time). Garments must be replaced every 4-6 months for effectiveness. There are no substitutes available that offer acceptable compression treatment for this Lymphedema patient. If further information is requested, please contact our certified lymphedema therapists at [phone].      Celia Naranjo, PT, MSPT, CLT-MELODIE [] Mery Arshad, MS, OTR/L, CLT [x]  Jacky Russo, PT, CLT [] Rosi Goodell, PTA, CLT, CSIS    Printed MD Name  MD Signature  Date

## 2021-12-20 ENCOUNTER — OFFICE VISIT (OUTPATIENT)
Dept: SURGERY | Age: 66
End: 2021-12-20
Payer: MEDICARE

## 2021-12-20 VITALS
HEIGHT: 71 IN | HEART RATE: 95 BPM | BODY MASS INDEX: 44.1 KG/M2 | WEIGHT: 315 LBS | TEMPERATURE: 98 F | DIASTOLIC BLOOD PRESSURE: 71 MMHG | SYSTOLIC BLOOD PRESSURE: 124 MMHG | OXYGEN SATURATION: 97 %

## 2021-12-20 DIAGNOSIS — E11.42 TYPE 2 DIABETES MELLITUS WITH DIABETIC POLYNEUROPATHY, WITH LONG-TERM CURRENT USE OF INSULIN (HCC): ICD-10-CM

## 2021-12-20 DIAGNOSIS — Z79.4 TYPE 2 DIABETES MELLITUS WITH DIABETIC POLYNEUROPATHY, WITH LONG-TERM CURRENT USE OF INSULIN (HCC): ICD-10-CM

## 2021-12-20 DIAGNOSIS — I87.312 IDIOPATHIC CHRONIC VENOUS HYPERTENSION OF LEFT LOWER EXTREMITY WITH ULCER (HCC): Primary | ICD-10-CM

## 2021-12-20 DIAGNOSIS — L97.929 IDIOPATHIC CHRONIC VENOUS HYPERTENSION OF LEFT LOWER EXTREMITY WITH ULCER (HCC): Primary | ICD-10-CM

## 2021-12-20 PROCEDURE — 29581 APPL MULTLAYER CMPRN SYS LEG: CPT | Performed by: SURGERY

## 2021-12-20 PROCEDURE — G8536 NO DOC ELDER MAL SCRN: HCPCS | Performed by: SURGERY

## 2021-12-20 PROCEDURE — G8417 CALC BMI ABV UP PARAM F/U: HCPCS | Performed by: SURGERY

## 2021-12-20 PROCEDURE — G8427 DOCREV CUR MEDS BY ELIG CLIN: HCPCS | Performed by: SURGERY

## 2021-12-20 PROCEDURE — 1101F PT FALLS ASSESS-DOCD LE1/YR: CPT | Performed by: SURGERY

## 2021-12-20 PROCEDURE — 99213 OFFICE O/P EST LOW 20 MIN: CPT | Performed by: SURGERY

## 2021-12-20 PROCEDURE — G8432 DEP SCR NOT DOC, RNG: HCPCS | Performed by: SURGERY

## 2021-12-20 PROCEDURE — 3017F COLORECTAL CA SCREEN DOC REV: CPT | Performed by: SURGERY

## 2021-12-20 PROCEDURE — 2022F DILAT RTA XM EVC RTNOPTHY: CPT | Performed by: SURGERY

## 2021-12-20 PROCEDURE — 3044F HG A1C LEVEL LT 7.0%: CPT | Performed by: SURGERY

## 2021-12-20 NOTE — PROGRESS NOTES
Chief Complaint   Patient presents with    Follow-up     wound check     Visit Vitals  /71 (BP 1 Location: Left arm, BP Patient Position: Sitting, BP Cuff Size: Adult)   Pulse 95   Temp 98 °F (36.7 °C) (Temporal)   Ht 5' 11\" (1.803 m)   Wt (!) 372 lb 9.6 oz (169 kg)   SpO2 97%   BMI 51.97 kg/m²

## 2021-12-23 NOTE — PROGRESS NOTES
VASCULAR FOLLOW UP      Subjective:   CHIEF COMPLAINTS:  Bilateral leg wound  PRESENTATION OF ILLNESS:  Dylan Denton is a 78 y.o. very pleasant man is here today 1 month follow-up. Patient has bilateral leg wound we are currently applying modified compression wraps both legs. Patient has also significant mobility issues due to COPD and oxygen dependent obesity. Last dressing was applied by nursing staff at home with multilayer compression wraps. Past Medical History:   Diagnosis Date    Arthritis     CAD (coronary artery disease)     Chronic obstructive pulmonary disease ([address]     Diabetes ([facility] [address]     Gout     Hypertension     Ill-defined condition     Sleep apnea       Past Surgical History:   Procedure Laterality Date    HX ORTHOPAEDIC      HX PACEMAKER      HX VEIN ABLATION ADHESIVE       Family History   Problem Relation Age of Onset    Heart Disease Mother     Kidney Disease Mother     Hypertension Mother     Diabetes Mother     Heart Disease Father     Hypertension Father     Diabetes Sister     Diabetes Brother       Social History     Tobacco Use    Smoking status: Never Smoker    Smokeless tobacco: Never Used   Substance Use Topics    Alcohol use: Not Currently       Prior to Admission medications    Medication Sig Start Date End Date Taking? Authorizing Provider   metoprolol succinate (TOPROL-XL) 25 mg XL tablet TAKE 1 TABLET BY MOUTH ONCE DAILY 8/9/21  Yes Provider, Historical   Entresto 24-26 mg tablet Take 2 Tablets by mouth. 8/10/21  Yes Provider, Historical   cloNIDine HCL (CATAPRES) 0.1 mg tablet Take 1 Tablet by mouth every twelve (12) hours. 5/27/21  Yes Provider, Historical   ammonium lactate (AMLACTIN) 12 % topical cream Apply  to affected area as needed for Other (xerosis).  rub in to affected area well  Indications: dry skin 6/8/21  Yes Susan Patel MD   vitamin E, dl,tocopheryl acet, (vitamin E acetate) 400 unit capsule Take 2 Capsules by mouth two (2) times a day. 6/8/21  Yes Kishor Patel MD   ascorbic acid, vitamin C, (Vitamin C) 500 mg tablet Take 1,000 mg by mouth two (2) times a day. Indications: inadequate vitamin C   Yes Provider, Historical   tamsulosin (Flomax) 0.4 mg capsule Take 0.4 mg by mouth daily. Yes Provider, Historical   tiotropium (Spiriva with HandiHaler) 18 mcg inhalation capsule Take 1 Capsule by inhalation daily. Yes Provider, Historical   bumetanide (BUMEX) 2 mg tablet Take 2 mg by mouth two (2) times a day. Yes Provider, Historical   vitamin e (E GEMS) 1,000 unit capsule Take 1,000 Units by mouth two (2) times a day. Yes Provider, Historical   multivitamin (ONE A DAY) tablet Take 1 Tablet by mouth daily. Yes Provider, Historical   ferrous sulfate (Iron) 325 mg (65 mg iron) tablet Take  by mouth Daily (before breakfast). Yes Provider, Historical   aspirin 81 mg chewable tablet Take 81 mg by mouth daily. Yes Provider, Historical   flunisolide (NASAREL) 25 mcg (0.025 %) spry 2 Sprays two (2) times a day. Yes Provider, Historical   albuterol (ProAir HFA) 90 mcg/actuation inhaler Take  by inhalation. Yes Provider, Historical   colchicine 0.6 mg tablet Take 0.6 mg by mouth daily. Yes Provider, Historical   gabapentin (NEURONTIN) 300 mg capsule Take 300 mg by mouth four (4) times daily. Yes Provider, Historical   oxyCODONE IR (ROXICODONE) 5 mg immediate release tablet Take 5 mg by mouth every twelve (12) hours. Yes Provider, Historical   metFORMIN (GLUCOPHAGE) 500 mg tablet Take 1,000 mg by mouth two (2) times daily (with meals). Yes Provider, Historical   insulin lispro (HumaLOG U-100 Insulin) 100 unit/mL injection by SubCUTAneous route. Sliding scale   Yes Provider, Historical   insulin glargine (LANTUS) 100 unit/mL injection by SubCUTAneous route nightly.  80units am/ 32units pm   Yes Provider, Historical     Allergies   Allergen Reactions    Elavil Angioedema    Naproxen Unknown (comments)     Pt not sure of reaction    Sulfa (Sulfonamide Antibiotics) Nausea and Vomiting        Review of Systems:  I reviewed the rest of organ systems personally and they were negative signed by Dr. Linnea Ley    Objective:     Visit Vitals  /71 (BP 1 Location: Left arm, BP Patient Position: Sitting, BP Cuff Size: Adult)   Pulse 95   Temp 98 °F (36.7 °C) (Temporal)   Ht 5' 11\" (1.803 m)   Wt (!) 372 lb 9.6 oz (169 kg)   SpO2 97%   BMI 51.97 kg/m²     VITAL SIGNS REVIEWED. Physical Exam:  Patient is well-nourished pleasant in conversation is appropriate. Head and neck examination atraumatic, normocephalic. Gaze appropriate. Conversation appropriate. Neck examination shows supple. No mass. No obvious carotid bruit. Chest examination shows lungs are clear bilaterally well-expanded, no crackles or wheezes. Cardiovascular system regular rate, no obvious murmur. Skin warm to touch  and moist, no skin lesions. Abdomen is soft ,not tender or distended bowel sounds present. No palpable mass. Neurological examinations, no focal neuro deficits moving all 4 extremities. Cranial nerves intact. Sensation is intact as well. Hematologic: No obvious bruise or swelling or obvious lymphadenopathy. Psychosocial: Appropriate. Has good effect. Musculoskeletal system: No muscle wasting, appropriate movements upper and lower extremity. Vascular examination: I remove the dressings on both legs and wounds examined. Wounds are completely closed especially left dorsum. I reapplied both legs with a multilayer compression wraps and patient tolerated. Data Review:   No visits with results within 1 Month(s) from this visit. Latest known visit with results is:   No results displayed because visit has over 200 results.            Assessment:     Problem List Items Addressed This Visit        Circulatory    Idiopathic chronic venous hypertension of left lower extremity with ulcer ([facility] [address] - Primary       Endocrine    Type 2 diabetes mellitus with diabetic polyneuropathy, with long-term current use of insulin ([address]              Plan:     Patient was advised to continue applying modified compression wraps with a multilayer weekly basis. Currently wounds are closed. Patient did have seen by the specialist about custom made compression stocking. He is still currently awaiting for that. I will reassess him again 3-month follow-up.         Tessy Shipley MD

## 2021-12-28 ENCOUNTER — HOSPITAL ENCOUNTER (OUTPATIENT)
Dept: PHYSICAL THERAPY | Age: 66
Discharge: HOME | End: 2021-12-28
Payer: MEDICARE

## 2021-12-28 PROCEDURE — 97140 MANUAL THERAPY 1/> REGIONS: CPT

## 2021-12-28 PROCEDURE — 97110 THERAPEUTIC EXERCISES: CPT

## 2021-12-28 NOTE — PROGRESS NOTES
LYMPHEDEMA PT 30 DAY TREATMENT NOTE - [facility] 2-15    Patient Name: Becky Palacio  Date:2021  : 1955  [x]  Patient  Verified  Payor: Carlos Bradford / Plan: [place] & B / Product Type: Medicare /    In time: 1:45 pm  Out time: 3:10 pm  Total Treatment Time (min): 85  Total Timed Codes (min): 85  1:1 Treatment Time ( [address] only): 85  Visit #: 2  Treatment Area: Lymphedema, not elsewhere classified [I89.0]    SUBJECTIVE  Pain Level (0-10 scale): No pain reported by patient. Any medication changes, allergies to medications, adverse drug reactions, diagnosis change, or new procedure performed?: [x] No    [] Yes (see summary sheet for update)  Subjective functional status/changes:   [x] No changes reported  Patient arrives to the visit with bandaging to the lower legs applied by Dr. Pedro Reyes. He reports that the wounds on his legs have healed. Patient is on 3 l/min of portable oxygen and was transferred to a tank in the Lymphedema Clinic to conserve his home oxygen. Patient is accompanied by his wife. OBJECTIVE    10 min Therapeutic Exercise:  [] Gabrielle Rode Exercises [x] Remedial Lymphedema Exercise Program - assisted SLR, hip abduction/adduction, SAQ's, heels slides, ankle pumps during skin care and bandaging [] Axillary Web Exercise Program      [] Shoulder ROM Exercises   Rationale: Activate muscle pump to improve lymphatic fluid movement and decrease swelling to improve the patients ability to perform ADL and IADL skills and prevent worsening of swelling over time. 75 min Manual Therapy    Kinesiotaping: Deferred       Manual Lymphatic Drainage (MLD):  Area to decongest: B FRITZ's   Sequence used and effectiveness: Continued education in self MLD with bathing and skin care. Skin/wound care/debridement: Reviewed skin care principles:   Patient's extremity bathed with soap and water. Performed skin care with low pH lotion - Aquaphor/Remedy lotion following manual lymph principles.    Skin care products  Hygiene  Prevention of cellulitis  Wound care   Applied multi-layer compression bandaging to: Patient arrives to the clinic with LE bandaging in place that was applied by Dr. Lorena Suh last week. The bandaging consisted of Ace Wraps and kerlix. Discussed the role and benefit of multi-layer compression bandaging with short stretch bandages/foam for reduction of swelling and for reshaping of the leg. Provided patient with 2 sets of comprilan bandages this visit and 1 set of bandages last visit. NEDRA HU's    The following multi-layer bandages were applied:  Tricofix      Padding layer:  Cellona - foot and ankle  Fleece - ankle to below knee    Foam:  12 cm roll - ankle to below knee    Short stretch bandages:   6 cm  8 cm  10 cm    Continued education in application of multi-layer compression bandaging with patient's wife able to demonstrate adequate technique during the visit today. Provided patient and wife with written information on bandaging technique and laundering instructions. Continued education in the wear schedule, precautions, and when to remove bandages:  with any increase in pain, numbness, shortness of breath, heart palpitations. Patient able to leave the clinic with full coverage tennis shoes and rollator for safety. Upper/Lower Extremity Compression: Measured for the following compression products this visit:    NEDRA Rudd      Style: Velcro and silver liner socks    Brand: two Solaris Ready Wrap calf units size large average length and Sigvaris Compre Boot Wraps size medium/large in the color beige    Type: non custom    Vendor: [employer]    Will submit a request to the [address] [place] [address] Avenue to assist with payment of garments and bandaging supplies. If products are covered by the fund, patient will return to the clinic for fitting of compression products.      Rationale: increase tissue extensibility and decrease edema  to improve the patients ability to better manage swelling and reduce the risk of infection during the restorative phase of care. With   [x] TE   [] TA   [x] MT   [] SC   [] other: Patient Education: [x] Review HEP    [x] MLD Patient Education Continued education in self MLD technique with bathing and skin care. [] Progressed/Changed HEP based on:   [x] positioning   [] Kinesiotape   [x] Skin care   [] wound care   [] other:   [x] Patient/caregiver in multi-layer bandaging donning principles. , Precautions., Supply ordering and types of bandaging. , Handout provided. and Further review required. Patient / caregiver re-demonstrated bandaging. [x] Yes  [] No  Compression bandaging/garment precautions reviewed: [x] Yes  [] No     Other Objective/Functional Measures: Girth measurements of the lower legs and feet taken today for sizing of compression products. Pain Level (0-10 scale) post treatment: No pain reported by patient. ASSESSMENT/Changes in Function:   Patient is returning to the clinic today for the first visit since evaluation on 11/23/21. It is difficult for patient to attend visits due to multiple medical issues and the hour drive to and from the clinic. Patient continues to present with secondary B LE lymphedema with skin changes present. The wounds are healed but skin remains tenuous. Patient would benefit from daily multi-layer compression bandaging with transition to compression products if covered by the [employer]. Patient's wife is supportive and is assisting with skin care and bandage application at home. Patient will return to the clinic for fitting of compression products once they are received. Patient is making progress toward all short term goals but will need to be extended to be met by 2/18/22.  Patient will continue to benefit from skilled PT services to  address swelling, analyze and address soft tissue restrictions, analyze compression product fit and use, instruct in home lymphedema management program and modify and progress therapeutic interventions to attain remaining goals. Progress towards goals / Updated goals:  GOALS  Short term goals  Time frame: to be met by 12/29/21, extend all short term goals to be met by 2/18/22  1. Patient will demonstrate knowledge of signs/symptoms of infections/cellulitis and be independent in skin care to prevent cellulitis. Goal in progress. 2.  Patient will demonstrate independence in lymphedema home program of therapeutic exercises to improve circulation and decongest limb to improve ADLs. Goal in progress. 3.  Patient will tolerate multi-layer bandages (MLB) and show measureable decrease in limb volume and/or participate in the selection process to allow ordering of home compression system (daytime, nighttime garments and pump as needed).    Goal in progress. Long term goals  Time frame: to be met by 2/18/22  1. Pt will show improvement in the Lymphedema Life Impact Scale (LLIS) by decreasing the score to 50 and thus allow improvement in patient's quality of life. 2.  Patient will be independent with don/doff of compression system and use in order to prevent reaccumulation of fluid at discharge. 3.  Pt will be independent in self-MLD and show stable limb volumes showing decongestion and pt. ready for transition to independent restorative phase of lymphedema therapy.     PLAN  [x]  Upgrade activities as tolerated     [x]  Continue plan of care  []  Update interventions per flow sheet       []  Discharge due to:_  []  Other:_      Celina Wolfe, PT, CLT  12/28/2021

## 2022-01-24 ENCOUNTER — TRANSCRIBE ORDER (OUTPATIENT)
Dept: SCHEDULING | Age: 67
End: 2022-01-24

## 2022-01-24 DIAGNOSIS — R07.9 CHEST PAIN, UNSPECIFIED: Primary | ICD-10-CM

## 2022-01-25 ENCOUNTER — HOSPITAL ENCOUNTER (OUTPATIENT)
Dept: PHYSICAL THERAPY | Age: 67
Discharge: HOME | End: 2022-01-25
Payer: MEDICARE

## 2022-01-25 PROCEDURE — 97110 THERAPEUTIC EXERCISES: CPT

## 2022-01-25 PROCEDURE — 97140 MANUAL THERAPY 1/> REGIONS: CPT

## 2022-01-25 NOTE — PROGRESS NOTES
LYMPHEDEMA PT 30 DAY TREATMENT NOTE - [facility] 2-15    Patient Name: Ron Rosario  Date:2022  : 1955  [x]  Patient  Verified  Payor: Inga Dallas / Plan: [place] MEDICARE PART A & B / Product Type: Medicare /    In time: 12:00 pm  Out time: 1:30 pm  Total Treatment Time (min): 90  Total Timed Codes (min): 90  1:1 Treatment Time ( [address] only): 90  Visit #: 3  Treatment Area: Lymphedema, not elsewhere classified [I89.0]    SUBJECTIVE  Pain Level (0-10 scale): No pain reported by patient. Any medication changes, allergies to medications, adverse drug reactions, diagnosis change, or new procedure performed?: [] No    [] Yes (see summary sheet for update) Patient reports that his Cardiologist modified the dose of the medication, Entresto, but is unsure of the dosage change. Subjective functional status/changes:   [] No changes reported  Patient arrives to the visit accompanied by his wife. He wears portable oxygen at 3 l/min via nasal cannula and is transferred to a tank in the Lymphedema Clinic to conserve his home oxygen. Patient reports having an appointment recently with his Cardiologist. He is scheduled for an \"ultrasound test with dye\" next week. He reports that his Cardiologist is aware that his legs have been bandaged for the past year or so and has cleared him to wear compression products on his legs. OBJECTIVE    10 min Therapeutic Exercise:  [] Manette Stands Exercises [x] Remedial Lymphedema Exercise Program in supine - provided patient with another set of written instructions  [] Axillary Web Exercise Program      [] Shoulder ROM Exercises   Rationale: Activate muscle pump to improve lymphatic fluid movement and decrease swelling to improve the patients ability to perform ADL and IADL skills and prevent worsening of swelling over time.     80 min Manual Therapy    Manual Lymphatic Drainage (MLD):  Area to decongest: NEDRA HU's   Sequence used and effectiveness: Continued education in self MLD with bathing and skin care and deep abdominal breathing techniques. Skin/wound care/debridement: Reviewed skin care principles:   Patient's extremity bathed with soap and water. Performed the following skin care: applied A and D ointment to tenuous skin on the lateral L lower leg and then Aquaphor/Remedy Repair lotion to intact skin following manual lymph principles. Skin care products - wife instructed to perform skin assessments and assist with skin care 1 to 2 times per day. Hygiene - patient instructed to remove and launder compression products when damp or soiled. Prevention of cellulitis - signs and symptoms of infection. Wound care   Discussed follow up with Podiatry for nail care. Skin on the heels is macerated and flaky. Applied multi-layer compression bandaging to: Patient arrives to the clinic with multi-layer compression bandaging in place. The bandaging applied by wife 3 to 4 days ago is now damp and slipping down the legs. Bandages were removed prior to skin care. Patient has 3 sets of bandaging supplies and has been instructed to bandage nightly and/or with any increase in swelling for better management of swelling. NEDRA Rudd    The following multi-layer bandages were applied:  Tricofix      Padding layer:  Cellona - foot and ankle  Fleece - ankle to below knee    Foam:  12 cm roll - ankle to below knee    Short stretch bandages:   6 cm  8 cm  10 cm    Patient's wife was instructed in bandaging technique during a previous visit and was provided with the written instructions to follow. Upper/Lower Extremity Compression: Patient was able to obtain the following compression products using the [address] [place] [address] Avenue. He was fitted with the products today and the initial fit and comfort of the products is good.      NEDRA Rudd      Style: Velcro and silver liner socks    Brand: two Solaris Ready Wrap calf units size large average length and [employer] size medium/large in the color beige    Type: non custom    Vendor: [employer]    Patient and wife were educated in donning and doffing, wear schedule, skin inspection, and laundering instructions. Patient instructed to remove compression products with any signs and symptoms of infection, pain, or progressive shortness of breath/heart palpitations and follow up in our clinic or with the PCP with any issues. Patient able to leave the clinic wearing full coverage tennis shoes and with rollator support for safety. Rationale: increase tissue extensibility and decrease edema  to improve the patients ability to better manage swelling and reduce the risk of infection during the restorative phase of care. With   [x] TE   [] TA   [x] MT   [] SC   [] other: Patient Education: [x] Review HEP and provided patient with another set of written instructions to follow. [x] MLD Patient Education Continued education in self MLD technique with bathing and skin care. Deep abdominal breathing techniques. [] Progressed/Changed HEP based on:   [x] positioning/elevate legs   [] Kinesiotape   [x] Skin care   [x] wound care   [x] other: nail care. [x] Patient/caregiver in multi-layer bandaging donning principles. , Precautions., Supply ordering and types of bandaging, handout provided. Patient / caregiver re-demonstrated bandaging. [x] Yes  [] No  Compression bandaging/garment precautions reviewed: [x] Yes  [] No     Other Objective/Functional Measures: Unable to assess full LE volumes as patient is reluctant to remove his pants for measurements to be taken. Volumes of the lower legs were assessed today. Volumes of the R lower leg are 4,588.98 ml today and volumes of the L lower leg are 4,471.01 ml today. Percentage difference is 2.64% R lower leg > L lower leg. Pain Level (0-10 scale) post treatment: No pain reported by patient.      ASSESSMENT/Changes in Function:   Patient was last seen in our clinic 12/28/21. Patient's skin was inspected and remains tenuous on the lower legs and feet. Today's visit focused on skin care to improve skin integrity and decrease the risk of infections. Patient was also fitted with compression products for the lower legs and feet. Patient's wife is pleased with how easy the compression products are to manage and plans to assist patient with skin care and compression products each day. Patient is being followed closely by Cardiology and is scheduled for additional testing next week. Discussed gradually increasing time spent in compression products and signs and symptoms that would be cause for removal of compression products. Patient instructed to work with new products and participate in his home program to the best of his ability and then return to the clinic in 4 to 6 weeks or sooner if needed for assessment of skin and swelling. Patient met short term goal 2 and 3 this visit and is making progress toward all additional goals. Patient will continue to benefit from skilled PT services to  address swelling, analyze and address soft tissue restrictions, analyze compression product fit and use, instruct in home lymphedema management program and modify and progress therapeutic interventions to attain remaining goals. Progress towards goals / Updated goals:  GOALS  Short term goals  Time frame: to be met by 12/29/21, extend short term goal 1 to be met by 4/21/22  1. Patient will demonstrate knowledge of signs/symptoms of infections/cellulitis and be independent in skin care to prevent cellulitis. Continued education in performing daily skin care and assessments. Patient and wife are able to verbalize signs and symptoms of infection. Goal in progress. 2.  Patient will demonstrate independence in lymphedema home program of therapeutic exercises to improve circulation and decongest limb to improve ADLs.  Patient has been instructed in the remedial lymphedema exercises and to perform a routine daily. Goal met 1/25/22. 3.  Patient will tolerate multi-layer bandages (MLB) and show measureable decrease in limb volume and/or participate in the selection process to allow ordering of home compression system (daytime, nighttime garments and pump as needed).    Goal met 1/25/22. Long term goals  Time frame: to be met by 2/18/22, extend goals to be met by 4/21/22  1. Pt will show improvement in the Lymphedema Life Impact Scale (LLIS) by decreasing the score to 50 and thus allow improvement in patient's quality of life. 2.  Patient will be independent with don/doff of compression system and use in order to prevent reaccumulation of fluid at discharge. Goal in progress. 3.  Pt will be independent in self-MLD and show stable limb volumes showing decongestion and pt. ready for transition to independent restorative phase of lymphedema therapy. Goal in progress. PLAN  [x]  Upgrade activities as tolerated     [x]  Extend plan of care to 4/21/22 to allow patient to work with compression products and return to clinic for assessment of swelling and to prepare for discharge.   []  Update interventions per flow sheet       []  Discharge due to:_  []  Other:_      Anisha Dee, PT, CLT  1/25/2022   TREATMENT PLAN EFFECTIVE DATES:   1/25/2022 TO 4/21/2022  I have read the above plan of care for Benetta Schwab. I certify the above prescribed services are required by this patient and are medically necessary.   The above plan of care has been developed in conjunction with the lymphedema/physical therapist.   Physician Signature: ____________________________________________________     Elizabeth Sifuentes MD    Date: _________________________________________________________________

## 2022-02-01 ENCOUNTER — HOSPITAL ENCOUNTER (OUTPATIENT)
Dept: NON INVASIVE DIAGNOSTICS | Age: 67
Discharge: HOME | End: 2022-02-01
Attending: INTERNAL MEDICINE
Payer: MEDICARE

## 2022-02-01 DIAGNOSIS — R07.9 CHEST PAIN, UNSPECIFIED: ICD-10-CM

## 2022-02-01 LAB
ECHO AV MEAN GRADIENT: 5 MMHG
ECHO AV MEAN VELOCITY: 1.1 M/S
ECHO AV PEAK GRADIENT: 6 MMHG
ECHO AV PEAK VELOCITY: 1.2 M/S
ECHO AV VELOCITY RATIO: 0.75
ECHO AV VTI: 26.5 CM
ECHO LV E' LATERAL VELOCITY: 6 CM/S
ECHO LV E' SEPTAL VELOCITY: 6 CM/S
ECHO LVOT AV VTI INDEX: 0.84
ECHO LVOT MEAN GRADIENT: 3 MMHG
ECHO LVOT PEAK GRADIENT: 3 MMHG
ECHO LVOT PEAK VELOCITY: 0.9 M/S
ECHO LVOT VTI: 22.2 CM
ECHO MV A VELOCITY: 0.75 M/S
ECHO MV E VELOCITY: 0.66 M/S
ECHO MV E/A RATIO: 0.88
ECHO MV E/E' LATERAL: 11
ECHO MV E/E' RATIO (AVERAGED): 11
ECHO MV E/E' SEPTAL: 11
ECHO MV LVOT VTI INDEX: 0.77
ECHO MV MAX VELOCITY: 0.6 M/S
ECHO MV MEAN GRADIENT: 1 MMHG
ECHO MV MEAN VELOCITY: 0.4 M/S
ECHO MV PEAK GRADIENT: 2 MMHG
ECHO MV REGURGITANT PEAK GRADIENT: 8 MMHG
ECHO MV REGURGITANT PEAK VELOCITY: 1.4 M/S
ECHO MV REGURGITANT VTIA: 32.2 CM
ECHO MV VTI: 17.1 CM
ECHO PV MAX VELOCITY: 0.8 M/S
ECHO PV MEAN GRADIENT: 2 MMHG
ECHO PV MEAN VELOCITY: 0.6 M/S
ECHO PV PEAK GRADIENT: 3 MMHG
ECHO PV VTI: 16 CM
ECHO RV FREE WALL PEAK S': 18 CM/S
ECHO TV REGURGITANT MAX VELOCITY: 1.19 M/S
ECHO TV REGURGITANT PEAK GRADIENT: 6 MMHG
ECHO TV REGURGITANT VTI: 15.3 CM

## 2022-02-01 PROCEDURE — C8929 TTE W OR WO FOL WCON,DOPPLER: HCPCS

## 2022-03-02 ENCOUNTER — HOSPITAL ENCOUNTER (OUTPATIENT)
Dept: WOUND CARE | Age: 67
Discharge: HOME | End: 2022-03-02
Payer: MEDICARE

## 2022-03-02 VITALS
HEART RATE: 97 BPM | TEMPERATURE: 98.2 F | RESPIRATION RATE: 22 BRPM | SYSTOLIC BLOOD PRESSURE: 122 MMHG | DIASTOLIC BLOOD PRESSURE: 64 MMHG

## 2022-03-02 DIAGNOSIS — I87.312 IDIOPATHIC CHRONIC VENOUS HYPERTENSION OF LEFT LOWER EXTREMITY WITH ULCER (HCC): ICD-10-CM

## 2022-03-02 DIAGNOSIS — L97.929 IDIOPATHIC CHRONIC VENOUS HYPERTENSION OF LEFT LOWER EXTREMITY WITH ULCER (HCC): ICD-10-CM

## 2022-03-02 DIAGNOSIS — R60.0 LOCALIZED EDEMA: ICD-10-CM

## 2022-03-02 PROBLEM — L97.822 NON-PRESSURE CHRONIC ULCER OF OTHER PART OF LEFT LOWER LEG WITH FAT LAYER EXPOSED (HCC): Status: ACTIVE | Noted: 2022-03-02

## 2022-03-02 PROCEDURE — 99213 OFFICE O/P EST LOW 20 MIN: CPT

## 2022-03-02 PROCEDURE — 29581 APPL MULTLAYER CMPRN SYS LEG: CPT

## 2022-03-02 PROCEDURE — 74011000250 HC RX REV CODE- 250: Performed by: SPECIALIST

## 2022-03-02 RX ORDER — LIDOCAINE HYDROCHLORIDE 20 MG/ML
15 SOLUTION OROPHARYNGEAL AS NEEDED
Status: DISCONTINUED | OUTPATIENT
Start: 2022-03-02 | End: 2022-03-04 | Stop reason: HOSPADM

## 2022-03-02 RX ORDER — CIPROFLOXACIN 500 MG/1
500 TABLET ORAL 2 TIMES DAILY
COMMUNITY
End: 2022-03-16

## 2022-03-02 RX ORDER — CLINDAMYCIN HYDROCHLORIDE 300 MG/1
300 CAPSULE ORAL 3 TIMES DAILY
COMMUNITY
End: 2022-03-16

## 2022-03-02 RX ORDER — MULTIVIT WITH MINERALS/HERBS
1 TABLET ORAL DAILY
COMMUNITY

## 2022-03-02 NOTE — WOUND CARE
[address]   Progress Note and Procedure Note   NO Procedure      [address] RECORD NUMBER:  [id number]  AGE: 78 y.o. RACE WHITE/NON-  GENDER: male  : 1955  EPISODE DATE:  3/2/2022    Subjective:   Patient was last seen here in 2021. He has been followed by Dr. Hamilton Ortiz since that time. He presents now with a 1 week history of recurrent left leg ulcerations. He has been treating his wounds with Xeroform and Profore wrap. Chief Complaint   Patient presents with    Wound Check     L leg, L foot         HISTORY of PRESENT ILLNESS HPI    Vineet De Anda is a 78 y.o. male who presents today for wound/ulcer evaluation.    History of Wound Context: LLE  Wound/Ulcer Pain Timing/Severity: mild  Quality of pain: burning  Severity:  1 / 10   Modifying Factors: None  Associated Signs/Symptoms: edema    Ulcer Identification:  Ulcer Type: venous    Contributing Factors: lymphedema    Wound: LLE         PAST MEDICAL HISTORY    Past Medical History:   Diagnosis Date    Arthritis     CAD (coronary artery disease)     Chronic obstructive pulmonary disease (HCC)     Diabetes ([facility] [address]     Gout     Hypertension     Ill-defined condition     Sleep apnea         PAST SURGICAL HISTORY    Past Surgical History:   Procedure Laterality Date    HX ORTHOPAEDIC      HX PACEMAKER      HX VEIN ABLATION ADHESIVE         FAMILY HISTORY    Family History   Problem Relation Age of Onset    Heart Disease Mother     Kidney Disease Mother     Hypertension Mother     Diabetes Mother     Heart Disease Father     Hypertension Father     Diabetes Sister     Diabetes Brother        SOCIAL HISTORY    Social History     Tobacco Use    Smoking status: Never Smoker    Smokeless tobacco: Never Used   Vaping Use    Vaping Use: Never used   Substance Use Topics    Alcohol use: Not Currently    Drug use: Never       ALLERGIES    Allergies   Allergen Reactions    Elavil Angioedema  Naproxen Unknown (comments)     Pt not sure of reaction    Sulfa (Sulfonamide Antibiotics) Nausea and Vomiting       MEDICATIONS    Current Outpatient Medications on File Prior to Encounter   Medication Sig Dispense Refill    insulin glargine,hum.rec.anlog (TOUJEO MAX U-300 SOLOSTAR SC) 80 Units by SubCUTAneous route two (2) times a day. 80 units in am, 34 units in pm      clindamycin (CLEOCIN) 300 mg capsule Take 300 mg by mouth three (3) times daily.  ciprofloxacin HCl (CIPRO) 500 mg tablet Take 500 mg by mouth two (2) times a day.  b complex vitamins tablet Take 1 Tablet by mouth daily.  Entresto 24-26 mg tablet Take 2 Tablets by mouth.  cloNIDine HCL (CATAPRES) 0.1 mg tablet Take 1 Tablet by mouth every twelve (12) hours.  ammonium lactate (AMLACTIN) 12 % topical cream Apply  to affected area as needed for Other (xerosis). rub in to affected area well  Indications: dry skin 280 g 0    vitamin E, dl,tocopheryl acet, (vitamin E acetate) 400 unit capsule Take 2 Capsules by mouth two (2) times a day. 100 Capsule 0    ascorbic acid, vitamin C, (Vitamin C) 500 mg tablet Take 1,000 mg by mouth two (2) times a day. Indications: inadequate vitamin C      tamsulosin (Flomax) 0.4 mg capsule Take 0.4 mg by mouth daily.  tiotropium (Spiriva with HandiHaler) 18 mcg inhalation capsule Take 1 Capsule by inhalation daily.  bumetanide (BUMEX) 2 mg tablet Take 2 mg by mouth two (2) times a day.  ferrous sulfate (Iron) 325 mg (65 mg iron) tablet Take  by mouth Daily (before breakfast).  aspirin 81 mg chewable tablet Take 81 mg by mouth daily.  flunisolide (NASAREL) 25 mcg (0.025 %) spry 2 Sprays two (2) times a day.  albuterol (ProAir HFA) 90 mcg/actuation inhaler Take  by inhalation.  colchicine 0.6 mg tablet Take 0.6 mg by mouth daily.  gabapentin (NEURONTIN) 300 mg capsule Take 300 mg by mouth four (4) times daily.       oxyCODONE IR (ROXICODONE) 5 mg immediate release tablet Take 5 mg by mouth every twelve (12) hours.  metFORMIN (GLUCOPHAGE) 500 mg tablet Take 1,000 mg by mouth two (2) times daily (with meals).  insulin lispro (HumaLOG U-100 Insulin) 100 unit/mL injection by SubCUTAneous route. Sliding scale       No current facility-administered medications on file prior to encounter. REVIEW OF SYSTEMS    Pertinent items are noted in HPI. Objective:     Visit Vitals  /64 (BP 1 Location: Left upper arm, BP Patient Position: At rest;Sitting)   Pulse 97   Temp 98.2 °F (36.8 °C)   Resp 22       Wt Readings from Last 3 Encounters:   12/20/21 (!) 169 kg (372 lb 9.6 oz)   11/08/21 (!) 168.6 kg (371 lb 12.8 oz)   10/25/21 (!) 168.3 kg (371 lb)       PHYSICAL EXAM  On the left lateral leg, there are 2 superficial recurrent, no foul odor, no evidence of active infection  Pertinent items are noted in HPI. Assessment:    recurrent L leg wounds  Problem List Items Addressed This Visit     Idiopathic chronic venous hypertension of left lower extremity with ulcer (HCC)    Relevant Medications    clindamycin (CLEOCIN) 300 mg capsule    ciprofloxacin HCl (CIPRO) 500 mg tablet    Localized edema          Procedure Note  Indications:  Based on my examination of this patient's wound(s)/ulcer(s) today, debridement is not required to promote healing and evaluate the wound base. Wound Leg lower Left;Lateral #1 (Active)   Wound Image   03/02/22 1052   Wound Etiology Venous 03/02/22 1052   Dressing Status Breakthrough drainage noted 03/02/22 1052   Cleansed Cleansed with saline 03/02/22 1052   Wound Length (cm) 11 cm 03/02/22 1052   Wound Width (cm) 10 cm 03/02/22 1052   Wound Depth (cm) 0.1 cm 03/02/22 1052   Wound Surface Area (cm^2) 110 cm^2 03/02/22 1052   Wound Volume (cm^3) 11 cm^3 03/02/22 1052   Wound Assessment Slough;Pink/red;Granulation tissue 03/02/22 1052   Drainage Amount Moderate 03/02/22 1052   Drainage Description Serosanguinous; Yellow 03/02/22 1052   Wound Odor None 03/02/22 1052   Alissa-Wound/Incision Assessment Fragile 03/02/22 1052   Edges Attached edges 03/02/22 1052   Wound Thickness Description Full thickness 03/02/22 1052   Number of days: 0       Wound Foot Left #2 (Active)   Wound Image   03/02/22 1052   Wound Etiology Venous 03/02/22 1052   Dressing Status Clean;Dry; Intact 03/02/22 1052   Cleansed Cleansed with saline 03/02/22 1052   Wound Length (cm) 0.8 cm 03/02/22 1052   Wound Width (cm) 0.7 cm 03/02/22 1052   Wound Depth (cm) 0.1 cm 03/02/22 1052   Wound Surface Area (cm^2) 0.56 cm^2 03/02/22 1052   Wound Volume (cm^3) 0.056 cm^3 03/02/22 1052   Wound Assessment Pink/red;Granulation tissue 03/02/22 1052   Drainage Amount Small 03/02/22 1052   Drainage Description Serosanguinous 03/02/22 1052   Wound Odor None 03/02/22 1052   Alissa-Wound/Incision Assessment Intact 03/02/22 1052   Edges Attached edges 03/02/22 1052   Wound Thickness Description Partial thickness 03/02/22 1052   Number of days: 0        Plan:   Continue xeroform, coflex lite compression    Treatment Note please see attached Discharge Instructions    Written patient dismissal instructions given to patient or POA.          Electronically signed by Gisselle Singh MD on 3/2/2022 at 11:22 AM

## 2022-03-02 NOTE — DISCHARGE INSTRUCTIONS
Discharge Instructions  [address] [place] [address], [address] [place] [address]  Telephone: [phone]    NAME:  Glen Longo OF BIRTH:  1955  MEDICAL RECORD NUMBER:  [id number]  DATE: 03/02/2022      Return Appointment:  [] Dressing supply provider:   [x] [facility] [address]  (initiate skilled)  [x] Return Appointment:   [] Nurse Visit:   Thursday    [] Discharge from [facility]  [] Ordered tests:    [x] Referrals: patient seeing Dr. Juan Carlos Zapata also  [x] Rx:   Home Health to order JUXTI lites    Wound Cleansing:   Do not scrub or use excessive force. Cleanse wound prior to applying a clean dressing with:  [x] Normal Saline   [x] Mild Soap & Water    [] Keep Wound Dry in Shower  [] Other:      Topical Treatments:  Do not apply lotions, creams, or ointments to wound bed unless directed. [] Apply moisturizing lotion to skin surrounding the wound prior to dressing change.  [] Apply antifungal ointment to skin surrounding the wound prior to dressing change.  [] Apply thin film of moisture barrier ointment to skin immediately around wound. [] Other:  Betadine to israel-wound     Dressings:           Wound Location Left leg and foot   [x] Apply Primary Dressing:       [] MediHoney Gel    [] MediHoney Alginate               [] Calcium Alginate      [] Calcium Alginate with Silver   [] Collagen                   [] Collagen with Silver                [] Santyl with Moistened saline gauze              [] Hydrofera Blue (cut to size and moistened)  [] Hydrofera Blue Ready (Cut to size)   [] NS wet to dry    [] Betadine wet to dry              [] Hydrogel                [] Mepitel     [] Bactroban/Mupirocin               [x] Other: xeroform      [x] Cover and Secure with:     [x] Gauze [] Annamaria Baker [] Kerlix   [] Foam [] Super Absorbant [x] ABD     [] ACE Wrap            [] Other:    Avoid contact of tape with skin.     [x] Change dressing: [] Daily [] Every Other Day [] Once per week   [] Twice per week   [x] Three times per week [] Do Not Change Dressing   [] Other:     Compression:  Apply: [] Multilayer Compression Wrap: []RightLeg []Left Leg                                 []Profore  [x]coflex lite Lite             []Unna     [] Do not get leg(s) with wrap wet. If wraps become too tight call the center or completely remove the wrap. [x] Elevate leg(s) above the level of the heart when sitting. [x] Avoid prolonged standing in one place. Dietary:  [x] Diet as tolerated: [] Calorie Diabetic Diet: [x] No Added Salt:  [] Increase Protein: [] Other:     Activity:  [x] Activity as tolerated:    [] Patient has no activity restrictions       [] Strict Bedrest:   [] Remain off Work:       [] May return to full duty work:                                     [] Return to work with restrictions:               [address] Information: Should you experience any significant changes in your wound(s) or have questions about your wound care, please contact [address] at [place] [address] 8:00 am - 4:30. If you need help with your wound outside of these hours and cannot wait until we are again available, contact your PCP or go to the hospital emergency room. PLEASE NOTE: IF YOU ARE UNABLE TO OBTAIN WOUND SUPPLIES, CONTINUE TO USE THE SUPPLIES YOU HAVE AVAILABLE UNTIL YOU ARE ABLE TO REACH US. IT IS MOST IMPORTANT TO KEEP THE WOUND COVERED AT ALL TIMES.     [x] Dr. Karma Lynch   [] Dr. Vaishali Mack  [] [facility]  [] Dr. Mickey Hernandez

## 2022-03-02 NOTE — WOUND CARE
Discharge Instructions  [facility], [facility]  Telephone: [phone]    NAME:  Yaa Police OF BIRTH:  1955  MEDICAL RECORD NUMBER:  [id number]  DATE: 03/02/2022      Return Appointment:  [] Dressing supply provider:   [x] Home Healthcare: [address]  (initiate skilled)  [x] Return Appointment:   [] Nurse Visit:   Thursday    [] Discharge from [facility]  [] Ordered tests:    [x] Referrals: patient seeing Dr. Gabrielle Contreras also  [x] Rx:   Home Health to order JUXTI lites    Wound Cleansing:   Do not scrub or use excessive force. Cleanse wound prior to applying a clean dressing with:  [x] Normal Saline   [x] Mild Soap & Water    [] Keep Wound Dry in Shower  [] Other:      Topical Treatments:  Do not apply lotions, creams, or ointments to wound bed unless directed. [] Apply moisturizing lotion to skin surrounding the wound prior to dressing change.  [] Apply antifungal ointment to skin surrounding the wound prior to dressing change.  [] Apply thin film of moisture barrier ointment to skin immediately around wound. [] Other:  Betadine to israel-wound     Dressings:           Wound Location Left leg and foot   [x] Apply Primary Dressing:       [] MediHoney Gel    [] MediHoney Alginate               [] Calcium Alginate      [] Calcium Alginate with Silver   [] Collagen                   [] Collagen with Silver                [] Santyl with Moistened saline gauze              [] Hydrofera Blue (cut to size and moistened)  [] Hydrofera Blue Ready (Cut to size)   [] NS wet to dry    [] Betadine wet to dry              [] Hydrogel                [] Mepitel     [] Bactroban/Mupirocin               [x] Other: xeroform      [x] Cover and Secure with:     [x] Gauze [] Molly Fanti [] Kerlix   [] Foam [] Super Absorbant [x] ABD     [] ACE Wrap            [] Other:    Avoid contact of tape with skin.     [x] Change dressing: [] Daily [] Every Other Day [] Once per week   [] Twice per week   [x] Three times per week [] Do Not Change Dressing   [] Other:     Compression:  Apply: [] Multilayer Compression Wrap: []RightLeg []Left Leg                                 []Profore  [x]coflex lite Lite             []Unna     [] Do not get leg(s) with wrap wet. If wraps become too tight call the center or completely remove the wrap. [x] Elevate leg(s) above the level of the heart when sitting. [x] Avoid prolonged standing in one place. Dietary:  [x] Diet as tolerated: [] Calorie Diabetic Diet: [x] No Added Salt:  [] Increase Protein: [] Other:     Activity:  [x] Activity as tolerated:    [] Patient has no activity restrictions       [] Strict Bedrest:   [] Remain off Work:       [] May return to full duty work:                                     [] Return to work with restrictions:               [address] [facility] [facility] [address] Information: Should you experience any significant changes in your wound(s) or have questions about your wound care, please contact Thais Doyle 26 at [place] [phone] 8:00 am - 4:30. If you need help with your wound outside of these hours and cannot wait until we are again available, contact your PCP or go to the hospital emergency room. PLEASE NOTE: IF YOU ARE UNABLE TO OBTAIN WOUND SUPPLIES, CONTINUE TO USE THE SUPPLIES YOU HAVE AVAILABLE UNTIL YOU ARE ABLE TO REACH US. IT IS MOST IMPORTANT TO KEEP THE WOUND COVERED AT ALL TIMES.     [x] Dr. Payal Grider   [] Dr. Ned Sicard  [] [facility]  [] Dr. Alethea Medrano

## 2022-03-09 ENCOUNTER — HOSPITAL ENCOUNTER (OUTPATIENT)
Dept: WOUND CARE | Age: 67
Discharge: HOME | End: 2022-03-09
Payer: MEDICARE

## 2022-03-09 VITALS
OXYGEN SATURATION: 99 % | TEMPERATURE: 97.3 F | DIASTOLIC BLOOD PRESSURE: 82 MMHG | RESPIRATION RATE: 20 BRPM | HEART RATE: 88 BPM | SYSTOLIC BLOOD PRESSURE: 132 MMHG

## 2022-03-09 PROBLEM — L97.522 ULCER OF LEFT FOOT, WITH FAT LAYER EXPOSED (HCC): Status: ACTIVE | Noted: 2022-03-09

## 2022-03-09 PROCEDURE — 11045 DBRDMT SUBQ TISS EACH ADDL: CPT

## 2022-03-09 PROCEDURE — 11042 DBRDMT SUBQ TIS 1ST 20SQCM/<: CPT

## 2022-03-09 RX ORDER — LIDOCAINE HYDROCHLORIDE 20 MG/ML
15 SOLUTION OROPHARYNGEAL AS NEEDED
Status: DISCONTINUED | OUTPATIENT
Start: 2022-03-09 | End: 2022-03-11 | Stop reason: HOSPADM

## 2022-03-09 NOTE — DISCHARGE INSTRUCTIONS
Discharge Instructions  [address] [place] [address], [address] [place] [address]  Telephone: [phone]    NAME:  Lucille Guallpa OF BIRTH:  1955  MEDICAL RECORD NUMBER:  [id number]  DATE: 03/02/2022      Return Appointment:  [] Dressing supply provider:   [x] Home Healthcare: [address]    [x] Return Appointment:   [] Nurse Visit:   Thursday    [] Discharge from [facility]  [] Ordered tests:    [x] Referrals: patient seeing Dr. He Díaz also  [x] Rx:   Home Health to order JUXTI lites    Wound Cleansing:   Do not scrub or use excessive force. Cleanse wound prior to applying a clean dressing with:  [x] Normal Saline   [x] Mild Soap & Water    [] Keep Wound Dry in Shower  [] Other:      Topical Treatments:  Do not apply lotions, creams, or ointments to wound bed unless directed. [] Apply moisturizing lotion to skin surrounding the wound prior to dressing change.  [] Apply antifungal ointment to skin surrounding the wound prior to dressing change.  [] Apply thin film of moisture barrier ointment to skin immediately around wound. [] Other:  Betadine to israel-wound     Dressings:           Wound Location Left leg and foot Clarice Ramirez  [x] Apply Primary Dressing:       [] MediHoney Gel    [] MediHoney Alginate               [] Calcium Alginate      [] Calcium Alginate with Silver   [] Collagen                   [] Collagen with Silver                [] Santyl with Moistened saline gauze              [] Hydrofera Blue (cut to size and moistened)  [] Hydrofera Blue Ready (Cut to size)   [] NS wet to dry    [] Betadine wet to dry              [] Hydrogel                [] Mepitel     [] Bactroban/Mupirocin               [x] Other: xeroform      [x] Cover and Secure with:     [x] Gauze [] [place] [] Kerlix   [] Foam [] Super Absorbant [x] ABD     [] ACE Wrap            [] Other:    Avoid contact of tape with skin.     [x] Change dressing: [] Daily    [] Every Other Day [] Once per week   [] Twice per week   [x] Three times per week [] Do Not Change Dressing   [] Other:     Compression:  Apply: [] Multilayer Compression Wrap: []RightLeg []Left Leg                                 []Profore  [x]coflex lite Lite             []Unna     [] Do not get leg(s) with wrap wet. If wraps become too tight call the center or completely remove the wrap. [x] Elevate leg(s) above the level of the heart when sitting. [x] Avoid prolonged standing in one place. Dietary:  [x] Diet as tolerated: [] Calorie Diabetic Diet: [x] No Added Salt:  [] Increase Protein: [] Other:     Activity:  [x] Activity as tolerated:    [] Patient has no activity restrictions       [] Strict Bedrest:   [] Remain off Work:       [] May return to full duty work:                                     [] Return to work with restrictions:               [address] Information: Should you experience any significant changes in your wound(s) or have questions about your wound care, please contact Thais Doyle 26 at [place] [address] 8:00 am - 4:30. If you need help with your wound outside of these hours and cannot wait until we are again available, contact your PCP or go to the hospital emergency room. PLEASE NOTE: IF YOU ARE UNABLE TO OBTAIN WOUND SUPPLIES, CONTINUE TO USE THE SUPPLIES YOU HAVE AVAILABLE UNTIL YOU ARE ABLE TO REACH US. IT IS MOST IMPORTANT TO KEEP THE WOUND COVERED AT ALL TIMES.     [x] Dr. Britany Noonan   [] Dr. Demi Wei  [] [facility]  [] Dr. Edward Everett

## 2022-03-09 NOTE — WOUND CARE
Discharge Instructions  [address] [place] [address], [address] [place] [address]  Telephone: [phone]    NAME:  Laurita Parnlel OF BIRTH:  1955  MEDICAL RECORD NUMBER:  [id number]  DATE: 03/02/2022      Return Appointment:  [] Dressing supply provider:   [x] Home Healthcare: [address]    [x] Return Appointment:   [] Nurse Visit:   Thursday    [] Discharge from [facility]  [] Ordered tests:    [x] Referrals: patient seeing Dr. Akil Campbell also  [x] Rx:   Home Health to order JUXTI lites    Wound Cleansing:   Do not scrub or use excessive force. Cleanse wound prior to applying a clean dressing with:  [x] Normal Saline   [x] Mild Soap & Water    [] Keep Wound Dry in Shower  [] Other:      Topical Treatments:  Do not apply lotions, creams, or ointments to wound bed unless directed. [] Apply moisturizing lotion to skin surrounding the wound prior to dressing change.  [] Apply antifungal ointment to skin surrounding the wound prior to dressing change.  [] Apply thin film of moisture barrier ointment to skin immediately around wound. [] Other:  Betadine to israel-wound     Dressings:           Wound Location Left leg and foot Tran Honey  [x] Apply Primary Dressing:       [] MediHoney Gel    [] MediHoney Alginate               [] Calcium Alginate      [] Calcium Alginate with Silver   [] Collagen                   [] Collagen with Silver                [] Santyl with Moistened saline gauze              [] Hydrofera Blue (cut to size and moistened)  [] Hydrofera Blue Ready (Cut to size)   [] NS wet to dry    [] Betadine wet to dry              [] Hydrogel                [] Mepitel     [] Bactroban/Mupirocin               [x] Other: xeroform      [x] Cover and Secure with:     [x] Gauze [] Rannie Briceño [] Kerlix   [] Foam [] Super Absorbant [x] ABD     [] ACE Wrap            [] Other:    Avoid contact of tape with skin.     [x] Change dressing: [] Daily    [] Every Other Day [] Once per week   [] Twice per week   [x] Three times per week [] Do Not Change Dressing   [] Other:     Compression:  Apply: [] Multilayer Compression Wrap: []RightLeg []Left Leg                                 []Profore  [x]coflex lite Lite             []Unna     [] Do not get leg(s) with wrap wet. If wraps become too tight call the center or completely remove the wrap. [x] Elevate leg(s) above the level of the heart when sitting. [x] Avoid prolonged standing in one place. Dietary:  [x] Diet as tolerated: [] Calorie Diabetic Diet: [x] No Added Salt:  [] Increase Protein: [] Other:     Activity:  [x] Activity as tolerated:    [] Patient has no activity restrictions       [] Strict Bedrest:   [] Remain off Work:       [] May return to full duty work:                                     [] Return to work with restrictions:               [address] Information: Should you experience any significant changes in your wound(s) or have questions about your wound care, please contact Thais Doyle 26 at [place] [phone] 8:00 am - 4:30. If you need help with your wound outside of these hours and cannot wait until we are again available, contact your PCP or go to the hospital emergency room. PLEASE NOTE: IF YOU ARE UNABLE TO OBTAIN WOUND SUPPLIES, CONTINUE TO USE THE SUPPLIES YOU HAVE AVAILABLE UNTIL YOU ARE ABLE TO REACH US. IT IS MOST IMPORTANT TO KEEP THE WOUND COVERED AT ALL TIMES.     [x] Dr. Geno Coronado   [] Dr. Flaco Duarte  [] [facility]  [] Dr. Aliyah Borden

## 2022-03-09 NOTE — WOUND CARE
[address]   Progress Note and Procedure Note     [address] RECORD NUMBER:  [id number]  AGE: 78 y.o. RACE WHITE/NON-  GENDER: male  : 1955  EPISODE DATE:  3/9/2022    Subjective:   No new problems reported  Chief Complaint   Patient presents with    Wound Check     L foot and leg         HISTORY of PRESENT ILLNESS HPI    Melvina Mathur is a 78 y.o. male who presents today for wound/ulcer evaluation.    History of Wound Context: L leg & foot  Wound/Ulcer Pain Timing/Severity: mild  Quality of pain: sharp  Severity:  1 / 10   Modifying Factors: None  Associated Signs/Symptoms: edema    Ulcer Identification:  Ulcer Type: venous    Contributing Factors: venous stasis    Wound: LLE         PAST MEDICAL HISTORY    Past Medical History:   Diagnosis Date    Arthritis     CAD (coronary artery disease)     Chronic obstructive pulmonary disease (HCC)     Diabetes ([facility] [address]     Gout     Hypertension     Ill-defined condition     Sleep apnea         PAST SURGICAL HISTORY    Past Surgical History:   Procedure Laterality Date    HX ORTHOPAEDIC      HX PACEMAKER      HX VEIN ABLATION ADHESIVE         FAMILY HISTORY    Family History   Problem Relation Age of Onset    Heart Disease Mother     Kidney Disease Mother     Hypertension Mother     Diabetes Mother     Heart Disease Father     Hypertension Father     Diabetes Sister     Diabetes Brother        SOCIAL HISTORY    Social History     Tobacco Use    Smoking status: Never Smoker    Smokeless tobacco: Never Used   Vaping Use    Vaping Use: Never used   Substance Use Topics    Alcohol use: Not Currently    Drug use: Never       ALLERGIES    Allergies   Allergen Reactions    Elavil Angioedema    Naproxen Unknown (comments)     Pt not sure of reaction    Sulfa (Sulfonamide Antibiotics) Nausea and Vomiting       MEDICATIONS    Current Outpatient Medications on File Prior to Encounter   Medication Sig Dispense Refill    insulin glargine,hum.rec.anlog (TOUJEO MAX U-300 SOLOSTAR SC) 80 Units by SubCUTAneous route two (2) times a day. 80 units in am, 34 units in pm      clindamycin (CLEOCIN) 300 mg capsule Take 300 mg by mouth three (3) times daily.  b complex vitamins tablet Take 1 Tablet by mouth daily.  Entresto 24-26 mg tablet Take 2 Tablets by mouth.  cloNIDine HCL (CATAPRES) 0.1 mg tablet Take 1 Tablet by mouth every twelve (12) hours.  ammonium lactate (AMLACTIN) 12 % topical cream Apply  to affected area as needed for Other (xerosis). rub in to affected area well  Indications: dry skin 280 g 0    vitamin E, dl,tocopheryl acet, (vitamin E acetate) 400 unit capsule Take 2 Capsules by mouth two (2) times a day. 100 Capsule 0    ascorbic acid, vitamin C, (Vitamin C) 500 mg tablet Take 1,000 mg by mouth two (2) times a day. Indications: inadequate vitamin C      tamsulosin (Flomax) 0.4 mg capsule Take 0.4 mg by mouth daily.  tiotropium (Spiriva with HandiHaler) 18 mcg inhalation capsule Take 1 Capsule by inhalation daily.  bumetanide (BUMEX) 2 mg tablet Take 2 mg by mouth two (2) times a day.  ferrous sulfate (Iron) 325 mg (65 mg iron) tablet Take  by mouth Daily (before breakfast).  aspirin 81 mg chewable tablet Take 81 mg by mouth daily.  flunisolide (NASAREL) 25 mcg (0.025 %) spry 2 Sprays two (2) times a day.  albuterol (ProAir HFA) 90 mcg/actuation inhaler Take  by inhalation.  colchicine 0.6 mg tablet Take 0.6 mg by mouth daily.  gabapentin (NEURONTIN) 300 mg capsule Take 300 mg by mouth four (4) times daily.  oxyCODONE IR (ROXICODONE) 5 mg immediate release tablet Take 5 mg by mouth every twelve (12) hours.  metFORMIN (GLUCOPHAGE) 500 mg tablet Take 1,000 mg by mouth two (2) times daily (with meals).  insulin lispro (HumaLOG U-100 Insulin) 100 unit/mL injection by SubCUTAneous route.  Sliding scale      ciprofloxacin HCl (CIPRO) 500 mg tablet Take 500 mg by mouth two (2) times a day. (Patient not taking: Reported on 3/9/2022)       No current facility-administered medications on file prior to encounter. REVIEW OF SYSTEMS    Pertinent items are noted in HPI. Objective:     Visit Vitals  /82 (BP 1 Location: Left arm, BP Patient Position: At rest;Sitting)   Pulse 88   Temp 97.3 °F (36.3 °C)   Resp 20   SpO2 99%       Wt Readings from Last 3 Encounters:   12/20/21 (!) 169 kg (372 lb 9.6 oz)   11/08/21 (!) 168.6 kg (371 lb 12.8 oz)   10/25/21 (!) 168.3 kg (371 lb)       PHYSICAL EXAM  LLE wounds unchanged, debrided, no infection  Pertinent items are noted in HPI. Assessment:    no change  Problem List Items Addressed This Visit     None          Wound Leg lower Left;Lateral #1 (Active)   Wound Image   03/09/22 1316   Wound Etiology Venous 03/09/22 1316   Dressing Status Clean;Dry; Intact 03/09/22 1316   Cleansed Soap and water 03/09/22 1316   Wound Length (cm) 11 cm 03/09/22 1316   Wound Width (cm) 8 cm 03/09/22 1316   Wound Depth (cm) 0.1 cm 03/09/22 1316   Wound Surface Area (cm^2) 88 cm^2 03/09/22 1316   Change in Wound Size % 20 03/09/22 1316   Wound Volume (cm^3) 8.8 cm^3 03/09/22 1316   Wound Healing % 20 03/09/22 1316   Post-Procedure Length (cm) 11 cm 03/09/22 1326   Post-Procedure Width (cm) 8 cm 03/09/22 1326   Post-Procedure Depth (cm) 0.1 cm 03/09/22 1326   Post-Procedure Surface Area (cm^2) 88 cm^2 03/09/22 1326   Post-Procedure Volume (cm^3) 8.8 cm^3 03/09/22 1326   Wound Assessment Slough;Pink/red;Granulation tissue 03/09/22 1316   Drainage Amount Large 03/09/22 1316   Drainage Description Serosanguinous 03/09/22 1316   Wound Odor None 03/09/22 1316   Alissa-Wound/Incision Assessment Fragile 03/09/22 1316   Edges Attached edges 03/09/22 1316   Wound Thickness Description Full thickness 03/09/22 1316   Number of days: 7       Wound Foot Left #2 (Active)   Wound Image   03/09/22 1314   Wound Etiology Venous 03/09/22 1314   Dressing Status Clean;Dry; Intact 03/09/22 1314   Cleansed Cleansed with saline 03/09/22 1314   Wound Length (cm) 0.6 cm 03/09/22 1314   Wound Width (cm) 0.6 cm 03/09/22 1314   Wound Depth (cm) 0.1 cm 03/09/22 1314   Wound Surface Area (cm^2) 0.36 cm^2 03/09/22 1314   Change in Wound Size % 35.71 03/09/22 1314   Wound Volume (cm^3) 0.036 cm^3 03/09/22 1314   Wound Healing % 36 03/09/22 1314   Wound Assessment Pink/red;Granulation tissue;Slough 03/09/22 1314   Drainage Amount None 03/09/22 1314   Drainage Description Serosanguinous 03/09/22 1314   Wound Odor None 03/09/22 1314   Alissa-Wound/Incision Assessment Intact 03/09/22 1314   Edges Attached edges 03/09/22 1314   Wound Thickness Description Full thickness 03/09/22 1314   Number of days: 7       Wound Heel Left;Medial #3 03/09/22 (Active)   Wound Image   03/09/22 1315   Wound Etiology Venous 03/09/22 1315   Dressing Status Clean;Dry; Intact 03/09/22 1315   Cleansed Cleansed with saline 03/09/22 1315   Wound Length (cm) 2.1 cm 03/09/22 1315   Wound Width (cm) 2.5 cm 03/09/22 1315   Wound Depth (cm) 0.1 cm 03/09/22 1315   Wound Surface Area (cm^2) 5.25 cm^2 03/09/22 1315   Wound Volume (cm^3) 0.525 cm^3 03/09/22 1315   Post-Procedure Length (cm) 2.1 cm 03/09/22 1326   Post-Procedure Width (cm) 2.5 cm 03/09/22 1326   Post-Procedure Depth (cm) 0.1 cm 03/09/22 1326   Post-Procedure Surface Area (cm^2) 5.25 cm^2 03/09/22 1326   Post-Procedure Volume (cm^3) 0.525 cm^3 03/09/22 1326   Wound Assessment Granulation tissue;Slough 03/09/22 1315   Drainage Amount Moderate 03/09/22 1315   Drainage Description Serosanguinous 03/09/22 1315   Wound Odor None 03/09/22 1315   Alissa-Wound/Incision Assessment Maceration 03/09/22 1315   Edges Attached edges 03/09/22 1315   Wound Thickness Description Full thickness 03/09/22 1315   Number of days: 0       POP IN PROCEDURE TYPE (DEBRIDEMENT, BIOPSY, I&D, SKIN SUB, CHEMICAL CAUERTY)     Plan:   Continue xeroform, coflex lite    Treatment Note please see attached Discharge Instructions    Written patient dismissal instructions given to patient or POA. Electronically signed by Victor Hugo Guy MD on 3/9/2022 at 1:32 PM              Debridement Wound Care        Problem List Items Addressed This Visit     None          Procedure Note  Indications:  Based on my examination of this patient's wound(s)/ulcer(s) today, debridement is required to promote healing and evaluate the wound base. Performed by: Victor Hugo Guy MD    Consent obtained: Yes    Time out taken: Yes    Debridement: Excisional    Using curette the wound(s)/ulcer(s) was/were sharply debrided down through and including the removal of    subcutaneous tissue    Devitalized Tissue Debrided: slough    Pre Debridement Measurements:  Are located in the Wound/Ulcer Documentation Flow Sheet    Non-Pressure ulcer, fat layer exposed    Wound/Ulcer #: 1, 2 and 3    Post Debridement Measurements:  Wound/Ulcer Descriptions are Pre Debridement except measurements:    Wound Leg lower Left;Lateral #1 (Active)   Wound Image   03/09/22 1316   Wound Etiology Venous 03/09/22 1316   Dressing Status Clean;Dry; Intact 03/09/22 1316   Cleansed Soap and water 03/09/22 1316   Wound Length (cm) 11 cm 03/09/22 1316   Wound Width (cm) 8 cm 03/09/22 1316   Wound Depth (cm) 0.1 cm 03/09/22 1316   Wound Surface Area (cm^2) 88 cm^2 03/09/22 1316   Change in Wound Size % 20 03/09/22 1316   Wound Volume (cm^3) 8.8 cm^3 03/09/22 1316   Wound Healing % 20 03/09/22 1316   Post-Procedure Length (cm) 11 cm 03/09/22 1326   Post-Procedure Width (cm) 8 cm 03/09/22 1326   Post-Procedure Depth (cm) 0.1 cm 03/09/22 1326   Post-Procedure Surface Area (cm^2) 88 cm^2 03/09/22 1326   Post-Procedure Volume (cm^3) 8.8 cm^3 03/09/22 1326   Wound Assessment Slough;Pink/red;Granulation tissue 03/09/22 1316   Drainage Amount Large 03/09/22 1316   Drainage Description Serosanguinous 03/09/22 1316   Wound Odor None 03/09/22 1316   Alissa-Wound/Incision Assessment Fragile 03/09/22 1316   Edges Attached edges 03/09/22 1316   Wound Thickness Description Full thickness 03/09/22 1316   Number of days: 7       Wound Foot Left #2 (Active)   Wound Image   03/09/22 1314   Wound Etiology Venous 03/09/22 1314   Dressing Status Clean;Dry; Intact 03/09/22 1314   Cleansed Cleansed with saline 03/09/22 1314   Wound Length (cm) 0.6 cm 03/09/22 1314   Wound Width (cm) 0.6 cm 03/09/22 1314   Wound Depth (cm) 0.1 cm 03/09/22 1314   Wound Surface Area (cm^2) 0.36 cm^2 03/09/22 1314   Change in Wound Size % 35.71 03/09/22 1314   Wound Volume (cm^3) 0.036 cm^3 03/09/22 1314   Wound Healing % 36 03/09/22 1314   Wound Assessment Pink/red;Granulation tissue;Slough 03/09/22 1314   Drainage Amount None 03/09/22 1314   Drainage Description Serosanguinous 03/09/22 1314   Wound Odor None 03/09/22 1314   Alissa-Wound/Incision Assessment Intact 03/09/22 1314   Edges Attached edges 03/09/22 1314   Wound Thickness Description Full thickness 03/09/22 1314   Number of days: 7       Wound Heel Left;Medial #3 03/09/22 (Active)   Wound Image   03/09/22 1315   Wound Etiology Venous 03/09/22 1315   Dressing Status Clean;Dry; Intact 03/09/22 1315   Cleansed Cleansed with saline 03/09/22 1315   Wound Length (cm) 2.1 cm 03/09/22 1315   Wound Width (cm) 2.5 cm 03/09/22 1315   Wound Depth (cm) 0.1 cm 03/09/22 1315   Wound Surface Area (cm^2) 5.25 cm^2 03/09/22 1315   Wound Volume (cm^3) 0.525 cm^3 03/09/22 1315   Post-Procedure Length (cm) 2.1 cm 03/09/22 1326   Post-Procedure Width (cm) 2.5 cm 03/09/22 1326   Post-Procedure Depth (cm) 0.1 cm 03/09/22 1326   Post-Procedure Surface Area (cm^2) 5.25 cm^2 03/09/22 1326   Post-Procedure Volume (cm^3) 0.525 cm^3 03/09/22 1326   Wound Assessment Granulation tissue;Slough 03/09/22 1315   Drainage Amount Moderate 03/09/22 1315   Drainage Description Serosanguinous 03/09/22 1315   Wound Odor None 03/09/22 1315 Alissa-Wound/Incision Assessment Maceration 03/09/22 1315   Edges Attached edges 03/09/22 1315   Wound Thickness Description Full thickness 03/09/22 1315   Number of days: 0        Total Surface Area Debrided:  91 sq cm     Estimated Blood Loss:  Minimal     Hemostasis Achieved: Pressure    Procedural Pain: 5 / 10     Post Procedural Pain: 2 / 10     Response to treatment: Well tolerated by patient

## 2022-03-09 NOTE — WOUND CARE
Multilayer Compression Wrap   (Not Unna) Below the Knee    NAME:  Guzman Dominguez OF BIRTH:  1955  MEDICAL RECORD NUMBER:  [id number]  DATE:  3/9/2022    Removed old Multilayer wrap if indicated and wash leg with mild soap/water. Applied moisturizing agent to dry skin as needed. Applied primary and secondary dressing as ordered. Applied multilayered dressing below the knee to left lower leg. Instructed patient/caregiver not to remove dressing and to keep it clean and dry. Instructed patient/caregiver on complications to report to provider, such as pain, numbness in toes, heavy drainage, and slippage of dressing. Instructed patient on purpose of compression dressing and on activity and exercise recommendations.     Response to treatment: Well tolerated by patient       Electronically signed by Ginny Larsen RN on 3/9/2022 at 1:43 PM

## 2022-03-16 ENCOUNTER — HOSPITAL ENCOUNTER (OUTPATIENT)
Dept: WOUND CARE | Age: 67
Discharge: HOME | End: 2022-03-16
Payer: MEDICARE

## 2022-03-16 VITALS
TEMPERATURE: 98.1 F | HEART RATE: 114 BPM | SYSTOLIC BLOOD PRESSURE: 103 MMHG | DIASTOLIC BLOOD PRESSURE: 62 MMHG | OXYGEN SATURATION: 99 % | RESPIRATION RATE: 22 BRPM

## 2022-03-16 DIAGNOSIS — R60.0 LOCALIZED EDEMA: ICD-10-CM

## 2022-03-16 DIAGNOSIS — L97.822 NON-PRESSURE CHRONIC ULCER OF OTHER PART OF LEFT LOWER LEG WITH FAT LAYER EXPOSED (HCC): ICD-10-CM

## 2022-03-16 DIAGNOSIS — I87.312 IDIOPATHIC CHRONIC VENOUS HYPERTENSION OF LEFT LOWER EXTREMITY WITH ULCER (HCC): ICD-10-CM

## 2022-03-16 DIAGNOSIS — L97.522 ULCER OF LEFT FOOT, WITH FAT LAYER EXPOSED (HCC): ICD-10-CM

## 2022-03-16 DIAGNOSIS — L97.929 IDIOPATHIC CHRONIC VENOUS HYPERTENSION OF LEFT LOWER EXTREMITY WITH ULCER (HCC): ICD-10-CM

## 2022-03-16 PROCEDURE — 74011000250 HC RX REV CODE- 250: Performed by: SPECIALIST

## 2022-03-16 PROCEDURE — 11045 DBRDMT SUBQ TISS EACH ADDL: CPT

## 2022-03-16 PROCEDURE — 11042 DBRDMT SUBQ TIS 1ST 20SQCM/<: CPT

## 2022-03-16 RX ORDER — LIDOCAINE HYDROCHLORIDE 20 MG/ML
15 SOLUTION OROPHARYNGEAL AS NEEDED
Status: DISCONTINUED | OUTPATIENT
Start: 2022-03-16 | End: 2022-03-18 | Stop reason: HOSPADM

## 2022-03-16 NOTE — WOUND CARE
Discharge Instructions  [address] [place] [address], [address] [place] [address]  Telephone: [phone]    NAME:  Tracee Stock OF BIRTH:  1955  MEDICAL RECORD NUMBER:  [id number]  DATE: 03/16/2022      Return Appointment:  [] Dressing supply provider:   [x] Home Healthcare: [address]    [x] Return Appointment:   [] Nurse Visit:   Thursday    [] [facility]charge from [facility]  [] Ordered tests:    [x] Referrals: patient seeing Dr. Олег Barraza also  [x] Rx:   Home Health to order JUXTI lites    Wound Cleansing:   Do not scrub or use excessive force. Cleanse wound prior to applying a clean dressing with:  [x] Normal Saline   [x] Mild Soap & Water    [] Keep Wound Dry in Shower  [] Other:      Topical Treatments:  Do not apply lotions, creams, or ointments to wound bed unless directed. [] Apply moisturizing lotion to skin surrounding the wound prior to dressing change.  [] Apply antifungal ointment to skin surrounding the wound prior to dressing change.  [] Apply thin film of moisture barrier ointment to skin immediately around wound. [] Other:  Betadine to israel-wound     Dressings:           Wound Location Left leg and foot Corrine Diehl  [x] Apply Primary Dressing:       [] MediHoney Gel    [] MediHoney Alginate               [] Calcium Alginate      [] Calcium Alginate with Silver   [] Collagen                   [] Collagen with Silver                [] Santyl with Moistened saline gauze              [] Hydrofera Blue (cut to size and moistened)  [] Hydrofera Blue Ready (Cut to size)   [] NS wet to dry    [] Betadine wet to dry              [] Hydrogel                [] Mepitel     [] Bactroban/Mupirocin               [x] Other: xeroform      [x] Cover and Secure with:     [x] Gauze [] Fran Munch [] Kerlix   [] Foam [] Super Absorbant [x] ABD     [] ACE Wrap            [] Other:    Avoid contact of tape with skin.     [x] Change dressing: [] Daily    [] Every Other Day [] Once per week   [] Twice per week   [x] Three times per week [] Do Not Change Dressing   [] Other:     Compression:  Apply: [] Multilayer Compression Wrap: []RightLeg []Left Leg                                 []Profore  [x]coflex lite Lite             []Unna     [] Do not get leg(s) with wrap wet. If wraps become too tight call the center or completely remove the wrap. [x] Elevate leg(s) above the level of the heart when sitting. [x] Avoid prolonged standing in one place. Dietary:  [x] Diet as tolerated: [] Calorie Diabetic Diet: [x] No Added Salt:  [] Increase Protein: [] Other:     Activity:  [x] Activity as tolerated:    [] Patient has no activity restrictions       [] Strict Bedrest:   [] Remain off Work:       [] May return to full duty work:                                     [] Return to work with restrictions:               [address] [facility] [address] [facility] [address] Information: Should you experience any significant changes in your wound(s) or have questions about your wound care, please contact Thais Doyle [address] at [place] [phone] 8:00 am - 4:30. If you need help with your wound outside of these hours and cannot wait until we are again available, contact your PCP or go to the hospital emergency room. PLEASE NOTE: IF YOU ARE UNABLE TO OBTAIN WOUND SUPPLIES, CONTINUE TO USE THE SUPPLIES YOU HAVE AVAILABLE UNTIL YOU ARE ABLE TO REACH US. IT IS MOST IMPORTANT TO KEEP THE WOUND COVERED AT ALL TIMES.     [x] Dr. Danita Arteaga   [] Dr. Ricki Leyva  [] Jenny Sanchez [facility]  [] Dr. Rossy Boogie

## 2022-03-16 NOTE — WOUND CARE
[address]   Progress Note and Procedure Note     [address] RECORD NUMBER:  [id number]  AGE: 78 y.o. RACE WHITE/NON-  GENDER: male  : 1955  EPISODE DATE:  3/16/2022    Subjective:   No new problems reported    Chief Complaint   Patient presents with    Wound Check     L leg and foot          HISTORY of PRESENT ILLNESS HPI    Marie Snider is a 78 y.o. male who presents today for wound/ulcer evaluation.    History of Wound Context: LLE  Wound/Ulcer Pain Timing/Severity: mild  Quality of pain: aching  Severity:  1 / 10   Modifying Factors: None  Associated Signs/Symptoms: edema    Ulcer Identification:  Ulcer Type: venous    Contributing Factors: lymphedema    Wound: LLE         PAST MEDICAL HISTORY    Past Medical History:   Diagnosis Date    Arthritis     CAD (coronary artery disease)     Chronic obstructive pulmonary disease (HCC)     Diabetes ([facility] [address]     Gout     Hypertension     Ill-defined condition     Sleep apnea         PAST SURGICAL HISTORY    Past Surgical History:   Procedure Laterality Date    HX ORTHOPAEDIC      HX PACEMAKER      HX VEIN ABLATION ADHESIVE         FAMILY HISTORY    Family History   Problem Relation Age of Onset    Heart Disease Mother     Kidney Disease Mother     Hypertension Mother     Diabetes Mother     Heart Disease Father     Hypertension Father     Diabetes Sister     Diabetes Brother        SOCIAL HISTORY    Social History     Tobacco Use    Smoking status: Never Smoker    Smokeless tobacco: Never Used   Vaping Use    Vaping Use: Never used   Substance Use Topics    Alcohol use: Not Currently    Drug use: Never       ALLERGIES    Allergies   Allergen Reactions    Elavil Angioedema    Naproxen Unknown (comments)     Pt not sure of reaction    Sulfa (Sulfonamide Antibiotics) Nausea and Vomiting       MEDICATIONS    Current Outpatient Medications on File Prior to Encounter   Medication Sig Dispense Refill    insulin glargine,hum.rec.anlog (TOUJEO MAX U-300 SOLOSTAR SC) 80 Units by SubCUTAneous route two (2) times a day. 80 units in am, 34 units in pm      b complex vitamins tablet Take 1 Tablet by mouth daily.  Entresto 24-26 mg tablet Take 2 Tablets by mouth.  cloNIDine HCL (CATAPRES) 0.1 mg tablet Take 1 Tablet by mouth every twelve (12) hours.  ammonium lactate (AMLACTIN) 12 % topical cream Apply  to affected area as needed for Other (xerosis). rub in to affected area well  Indications: dry skin 280 g 0    vitamin E, dl,tocopheryl acet, (vitamin E acetate) 400 unit capsule Take 2 Capsules by mouth two (2) times a day. 100 Capsule 0    ascorbic acid, vitamin C, (Vitamin C) 500 mg tablet Take 1,000 mg by mouth two (2) times a day. Indications: inadequate vitamin C      tamsulosin (Flomax) 0.4 mg capsule Take 0.4 mg by mouth daily.  tiotropium (Spiriva with HandiHaler) 18 mcg inhalation capsule Take 1 Capsule by inhalation daily.  bumetanide (BUMEX) 2 mg tablet Take 2 mg by mouth two (2) times a day.  ferrous sulfate (Iron) 325 mg (65 mg iron) tablet Take  by mouth Daily (before breakfast).  aspirin 81 mg chewable tablet Take 81 mg by mouth daily.  flunisolide (NASAREL) 25 mcg (0.025 %) spry 2 Sprays two (2) times a day.  albuterol (ProAir HFA) 90 mcg/actuation inhaler Take  by inhalation.  colchicine 0.6 mg tablet Take 0.6 mg by mouth daily.  gabapentin (NEURONTIN) 300 mg capsule Take 300 mg by mouth four (4) times daily.  oxyCODONE IR (ROXICODONE) 5 mg immediate release tablet Take 5 mg by mouth every twelve (12) hours.  metFORMIN (GLUCOPHAGE) 500 mg tablet Take 1,000 mg by mouth two (2) times daily (with meals).  insulin lispro (HumaLOG U-100 Insulin) 100 unit/mL injection by SubCUTAneous route. Sliding scale       No current facility-administered medications on file prior to encounter.        REVIEW OF SYSTEMS    Pertinent items are noted in HPI. Objective:     Visit Vitals  /62 (BP 1 Location: Right arm, BP Patient Position: At rest;Sitting)   Pulse (!) 114   Temp 98.1 °F (36.7 °C)   Resp 22   SpO2 99%       Wt Readings from Last 3 Encounters:   12/20/21 (!) 169 kg (372 lb 9.6 oz)   11/08/21 (!) 168.6 kg (371 lb 12.8 oz)   10/25/21 (!) 168.3 kg (371 lb)       PHYSICAL EXAM    Left medial foot wound, small and superficial  Left lateral leg wounds x2 unchanged, moderate slough debrided, no evidence of active infection  Pertinent items are noted in HPI. Assessment:   No improvement  Problem List Items Addressed This Visit     Idiopathic chronic venous hypertension of left lower extremity with ulcer (HCC)    Localized edema    Non-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 ([address]    Ulcer of left foot, with fat layer exposed ([address]          Wound Leg lower Left;Lateral #1 (Active)   Wound Image   03/16/22 1358   Wound Etiology Venous 03/16/22 1358   Dressing Status Clean;Dry; Intact 03/16/22 1358   Cleansed Soap and water 03/16/22 1358   Wound Length (cm) 11.5 cm 03/16/22 1358   Wound Width (cm) 8 cm 03/16/22 1358   Wound Depth (cm) 0.1 cm 03/16/22 1358   Wound Surface Area (cm^2) 92 cm^2 03/16/22 1358   Change in Wound Size % 16.36 03/16/22 1358   Wound Volume (cm^3) 9.2 cm^3 03/16/22 1358   Wound Healing % 16 03/16/22 1358   Post-Procedure Length (cm) 11.5 cm 03/16/22 1412   Post-Procedure Width (cm) 8 cm 03/16/22 1412   Post-Procedure Depth (cm) 0.1 cm 03/16/22 1412   Post-Procedure Surface Area (cm^2) 92 cm^2 03/16/22 1412   Post-Procedure Volume (cm^3) 9.2 cm^3 03/16/22 1412   Wound Assessment Slough;Pink/red;Granulation tissue;Eschar moist 03/16/22 1358   Drainage Amount Large 03/16/22 1358   Drainage Description Serosanguinous 03/16/22 1358   Wound Odor None 03/16/22 1358   Alissa-Wound/Incision Assessment Fragile; Maceration 03/16/22 1358   Edges Attached edges 03/16/22 1358   Wound Thickness Description Full thickness 03/16/22 1358   Number of days: 14       Wound Foot Left #2 (Active)   Wound Image   03/16/22 1359   Wound Etiology Venous 03/16/22 1359   Dressing Status Clean;Dry; Intact 03/16/22 1359   Cleansed Soap and water 03/16/22 1359   Wound Length (cm) 0.5 cm 03/16/22 1359   Wound Width (cm) 0.4 cm 03/16/22 1359   Wound Depth (cm) 0.1 cm 03/16/22 1359   Wound Surface Area (cm^2) 0.2 cm^2 03/16/22 1359   Change in Wound Size % 64.29 03/16/22 1359   Wound Volume (cm^3) 0.02 cm^3 03/16/22 1359   Wound Healing % 64 03/16/22 1359   Wound Assessment Pink/red;Granulation tissue;Slough 03/16/22 1359   Drainage Amount Small 03/16/22 1359   Drainage Description Serosanguinous 03/16/22 1359   Wound Odor None 03/16/22 1359   Alissa-Wound/Incision Assessment Intact 03/16/22 1359   Edges Attached edges 03/16/22 1359   Wound Thickness Description Full thickness 03/16/22 1359   Number of days: 14       Wound Heel Left;Medial #3 03/09/22 (Active)   Wound Image   03/16/22 1359   Wound Etiology Venous 03/16/22 1359   Dressing Status Clean;Dry; Intact 03/16/22 1359   Cleansed Soap and water 03/16/22 1359   Wound Length (cm) 7.3 cm 03/16/22 1359   Wound Width (cm) 5 cm 03/16/22 1359   Wound Depth (cm) 0.1 cm 03/16/22 1359   Wound Surface Area (cm^2) 36.5 cm^2 03/16/22 1359   Change in Wound Size % -595.24 03/16/22 1359   Wound Volume (cm^3) 3.65 cm^3 03/16/22 1359   Wound Healing % -595 03/16/22 1359   Post-Procedure Length (cm) 2.1 cm 03/09/22 1326   Post-Procedure Width (cm) 2.5 cm 03/09/22 1326   Post-Procedure Depth (cm) 0.1 cm 03/09/22 1326   Post-Procedure Surface Area (cm^2) 5.25 cm^2 03/09/22 1326   Post-Procedure Volume (cm^3) 0.525 cm^3 03/09/22 1326   Wound Assessment Granulation tissue;Slough 03/16/22 1359   Drainage Amount Moderate 03/16/22 1359   Drainage Description Serosanguinous 03/16/22 1359   Wound Odor None 03/16/22 1359   Alissa-Wound/Incision Assessment Maceration; Hyperkeratosis (Callous) 03/16/22 1359   Edges Attached edges 03/16/22 1359   Wound Thickness Description Full thickness 03/16/22 1359   Number of days: 7       POP IN PROCEDURE TYPE (DEBRIDEMENT, BIOPSY, I&D, SKIN SUB, CHEMICAL CAUERTY)     Plan:   Continue xeroform, Coflexlite    Treatment Note please see attached Discharge Instructions    Written patient dismissal instructions given to patient or POA. Electronically signed by Pastor Terrance MD on 3/16/2022 at 2:20 PM              Debridement Wound Care        Problem List Items Addressed This Visit     Idiopathic chronic venous hypertension of left lower extremity with ulcer ([address]    Localized edema    Non-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 ([address]    Ulcer of left foot, with fat layer exposed ([address]          Procedure Note  Indications:  Based on my examination of this patient's wound(s)/ulcer(s) today, debridement is required to promote healing and evaluate the wound base. Performed by: Pastor Terrance MD    Consent obtained: Yes    Time out taken: Yes    Debridement: Excisional    Using curette the wound(s)/ulcer(s) was/were sharply debrided down through and including the removal of    subcutaneous tissue    Devitalized Tissue Debrided: slough    Pre Debridement Measurements:  Are located in the Wound/Ulcer Documentation Flow Sheet    Non-Pressure ulcer, fat layer exposed    Wound/Ulcer #: 1    Post Debridement Measurements:  Wound/Ulcer Descriptions are Pre Debridement except measurements:    Wound Leg lower Left;Lateral #1 (Active)   Wound Image   03/16/22 1358   Wound Etiology Venous 03/16/22 1358   Dressing Status Clean;Dry; Intact 03/16/22 1358   Cleansed Soap and water 03/16/22 1358   Wound Length (cm) 11.5 cm 03/16/22 1358   Wound Width (cm) 8 cm 03/16/22 1358   Wound Depth (cm) 0.1 cm 03/16/22 1358   Wound Surface Area (cm^2) 92 cm^2 03/16/22 1358   Change in Wound Size % 16.36 03/16/22 1358   Wound Volume (cm^3) 9.2 cm^3 03/16/22 1358   Wound Healing % 16 03/16/22 1358   Post-Procedure Length (cm) 11.5 cm 03/16/22 1412   Post-Procedure Width (cm) 8 cm 03/16/22 1412   Post-Procedure Depth (cm) 0.1 cm 03/16/22 1412   Post-Procedure Surface Area (cm^2) 92 cm^2 03/16/22 1412   Post-Procedure Volume (cm^3) 9.2 cm^3 03/16/22 1412   Wound Assessment Slough;Pink/red;Granulation tissue;Eschar moist 03/16/22 1358   Drainage Amount Large 03/16/22 1358   Drainage Description Serosanguinous 03/16/22 1358   Wound Odor None 03/16/22 1358   Alissa-Wound/Incision Assessment Fragile; Maceration 03/16/22 1358   Edges Attached edges 03/16/22 1358   Wound Thickness Description Full thickness 03/16/22 1358   Number of days: 14       Wound Foot Left #2 (Active)   Wound Image   03/16/22 1359   Wound Etiology Venous 03/16/22 1359   Dressing Status Clean;Dry; Intact 03/16/22 1359   Cleansed Soap and water 03/16/22 1359   Wound Length (cm) 0.5 cm 03/16/22 1359   Wound Width (cm) 0.4 cm 03/16/22 1359   Wound Depth (cm) 0.1 cm 03/16/22 1359   Wound Surface Area (cm^2) 0.2 cm^2 03/16/22 1359   Change in Wound Size % 64.29 03/16/22 1359   Wound Volume (cm^3) 0.02 cm^3 03/16/22 1359   Wound Healing % 64 03/16/22 1359   Wound Assessment Pink/red;Granulation tissue;Slough 03/16/22 1359   Drainage Amount Small 03/16/22 1359   Drainage Description Serosanguinous 03/16/22 1359   Wound Odor None 03/16/22 1359   Alissa-Wound/Incision Assessment Intact 03/16/22 1359   Edges Attached edges 03/16/22 1359   Wound Thickness Description Full thickness 03/16/22 1359   Number of days: 14       Wound Heel Left;Medial #3 03/09/22 (Active)   Wound Image   03/16/22 1359   Wound Etiology Venous 03/16/22 1359   Dressing Status Clean;Dry; Intact 03/16/22 1359   Cleansed Soap and water 03/16/22 1359   Wound Length (cm) 7.3 cm 03/16/22 1359   Wound Width (cm) 5 cm 03/16/22 1359   Wound Depth (cm) 0.1 cm 03/16/22 1359   Wound Surface Area (cm^2) 36.5 cm^2 03/16/22 1359   Change in Wound Size % -595.24 03/16/22 1359   Wound Volume (cm^3) 3.65 cm^3 03/16/22 1359   Wound Healing % -595 03/16/22 1359   Post-Procedure Length (cm) 2.1 cm 03/09/22 1326   Post-Procedure Width (cm) 2.5 cm 03/09/22 1326   Post-Procedure Depth (cm) 0.1 cm 03/09/22 1326   Post-Procedure Surface Area (cm^2) 5.25 cm^2 03/09/22 1326   Post-Procedure Volume (cm^3) 0.525 cm^3 03/09/22 1326   Wound Assessment Granulation tissue;Slough 03/16/22 1359   Drainage Amount Moderate 03/16/22 1359   Drainage Description Serosanguinous 03/16/22 1359   Wound Odor None 03/16/22 1359   Alissa-Wound/Incision Assessment Maceration; Hyperkeratosis (Callous) 03/16/22 1359   Edges Attached edges 03/16/22 1359   Wound Thickness Description Full thickness 03/16/22 1359   Number of days: 7        Total Surface Area Debrided:  41 sq cm     Estimated Blood Loss:  Minimal     Hemostasis Achieved: Pressure    Procedural Pain: 5 / 10     Post Procedural Pain: 1 / 10     Response to treatment: Well tolerated by patient

## 2022-03-16 NOTE — WOUND CARE
Multilayer Compression Wrap   (Not Unna) Below the Knee    NAME:  Wisam Singh OF BIRTH:  1955  MEDICAL RECORD NUMBER:  [id number]  DATE:  3/16/2022    Removed old Multilayer wrap if indicated and wash leg with mild soap/water. Applied moisturizing agent to dry skin as needed. Applied primary and secondary dressing as ordered. Applied multilayered dressing below the knee to left lower leg. Instructed patient/caregiver not to remove dressing and to keep it clean and dry. Instructed patient/caregiver on complications to report to provider, such as pain, numbness in toes, heavy drainage, and slippage of dressing. Instructed patient on purpose of compression dressing and on activity and exercise recommendations.     Response to treatment: Well tolerated by patient       Electronically signed by Kari Velasco LPN on 2/29/4624 at 3:34 PM

## 2022-03-16 NOTE — DISCHARGE INSTRUCTIONS
Discharge Instructions  [address] [place] [address], [address] [place] [address]  Telephone: [phone]    NAME:  Jai Garcia OF BIRTH:  1955  MEDICAL RECORD NUMBER:  [id number]  DATE: 03/16/2022      Return Appointment:  [] Dressing supply provider:   [x] [facility] [address] [facility]    [x] Return Appointment:   [] Nurse Visit:   Thursday    [] Discharge from [facility]  [] Ordered tests:    [x] Referrals: patient seeing Dr. Zulema Hobbs also  [x] Rx:   Home Health to order JUXTI lites    Wound Cleansing:   Do not scrub or use excessive force. Cleanse wound prior to applying a clean dressing with:  [x] Normal Saline   [x] Mild Soap & Water    [] Keep Wound Dry in Shower  [] Other:      Topical Treatments:  Do not apply lotions, creams, or ointments to wound bed unless directed. [] Apply moisturizing lotion to skin surrounding the wound prior to dressing change.  [] Apply antifungal ointment to skin surrounding the wound prior to dressing change.  [] Apply thin film of moisture barrier ointment to skin immediately around wound. [] Other:  Betadine to israel-wound     Dressings:           Wound Location Left leg and foot Jorge Pitch  [x] Apply Primary Dressing:       [] MediHoney Gel    [] MediHoney Alginate               [] Calcium Alginate      [] Calcium Alginate with Silver   [] Collagen                   [] Collagen with Silver                [] Santyl with Moistened saline gauze              [] Hydrofera Blue (cut to size and moistened)  [] Hydrofera Blue Ready (Cut to size)   [] NS wet to dry    [] Betadine wet to dry              [] Hydrogel                [] Mepitel     [] Bactroban/Mupirocin               [x] Other: xeroform      [x] Cover and Secure with:     [x] Gauze [] Linda Butt [] Kerlix   [] Foam [] Super Absorbant [x] ABD     [] ACE Wrap            [] Other:    Avoid contact of tape with skin.     [x] Change dressing: [] Daily    [] Every Other Day [] Once per week   [] Twice per week   [x] Three times per week [] Do Not Change Dressing   [] Other:     Compression:  Apply: [] Multilayer Compression Wrap: []RightLeg []Left Leg                                 []Profore  [x]coflex lite Lite             []Unna     [] Do not get leg(s) with wrap wet. If wraps become too tight call the center or completely remove the wrap. [x] Elevate leg(s) above the level of the heart when sitting. [x] Avoid prolonged standing in one place. Dietary:  [x] Diet as tolerated: [] Calorie Diabetic Diet: [x] No Added Salt:  [] Increase Protein: [] Other:     Activity:  [x] Activity as tolerated:    [] Patient has no activity restrictions       [] Strict Bedrest:   [] Remain off Work:       [] May return to full duty work:                                     [] Return to work with restrictions:               [address] Information: Should you experience any significant changes in your wound(s) or have questions about your wound care, please contact [address] at [place] [phone] 8:00 am - 4:30. If you need help with your wound outside of these hours and cannot wait until we are again available, contact your PCP or go to the hospital emergency room. PLEASE NOTE: IF YOU ARE UNABLE TO OBTAIN WOUND SUPPLIES, CONTINUE TO USE THE SUPPLIES YOU HAVE AVAILABLE UNTIL YOU ARE ABLE TO REACH US. IT IS MOST IMPORTANT TO KEEP THE WOUND COVERED AT ALL TIMES.     [x] Dr. Justine Espino   [] Dr. Carolyn Ortiz  [] [facility]  [] Dr. Michelle Parisi

## 2022-03-18 PROBLEM — L97.522 ULCER OF LEFT FOOT, WITH FAT LAYER EXPOSED (HCC): Status: ACTIVE | Noted: 2022-03-09

## 2022-03-18 PROBLEM — L03.90 CELLULITIS: Status: ACTIVE | Noted: 2021-06-01

## 2022-03-18 PROBLEM — L97.929 IDIOPATHIC CHRONIC VENOUS HYPERTENSION OF LEFT LOWER EXTREMITY WITH ULCER (HCC): Status: ACTIVE | Noted: 2020-09-14

## 2022-03-18 PROBLEM — I87.312 IDIOPATHIC CHRONIC VENOUS HYPERTENSION OF LEFT LOWER EXTREMITY WITH ULCER (HCC): Status: ACTIVE | Noted: 2020-09-14

## 2022-03-18 PROBLEM — L97.822 NON-PRESSURE CHRONIC ULCER OF OTHER PART OF LEFT LOWER LEG WITH FAT LAYER EXPOSED (HCC): Status: ACTIVE | Noted: 2022-03-02

## 2022-03-18 PROBLEM — B35.1 ONYCHOMYCOSIS: Status: ACTIVE | Noted: 2020-11-23

## 2022-03-19 PROBLEM — U07.1 COVID-19: Status: ACTIVE | Noted: 2021-01-04

## 2022-03-19 PROBLEM — I83.009 VENOUS ULCER (HCC): Status: ACTIVE | Noted: 2021-06-17

## 2022-03-19 PROBLEM — R60.0 LOCALIZED EDEMA: Status: ACTIVE | Noted: 2020-09-21

## 2022-03-19 PROBLEM — L97.909 VENOUS ULCER (HCC): Status: ACTIVE | Noted: 2021-06-17

## 2022-03-19 PROBLEM — E11.42 TYPE 2 DIABETES MELLITUS WITH DIABETIC POLYNEUROPATHY, WITH LONG-TERM CURRENT USE OF INSULIN (HCC): Status: ACTIVE | Noted: 2020-11-23

## 2022-03-19 PROBLEM — Z79.4 TYPE 2 DIABETES MELLITUS WITH DIABETIC POLYNEUROPATHY, WITH LONG-TERM CURRENT USE OF INSULIN (HCC): Status: ACTIVE | Noted: 2020-11-23

## 2022-03-19 PROBLEM — L85.9 HYPERKERATOSIS: Status: ACTIVE | Noted: 2020-11-23

## 2022-03-20 PROBLEM — L03.115 CELLULITIS AND ABSCESS OF RIGHT LEG: Status: ACTIVE | Noted: 2021-06-02

## 2022-03-20 PROBLEM — L02.415 CELLULITIS AND ABSCESS OF RIGHT LEG: Status: ACTIVE | Noted: 2021-06-02

## 2022-03-23 ENCOUNTER — HOSPITAL ENCOUNTER (OUTPATIENT)
Dept: WOUND CARE | Age: 67
Discharge: HOME | End: 2022-03-23
Payer: MEDICARE

## 2022-03-23 VITALS
DIASTOLIC BLOOD PRESSURE: 73 MMHG | TEMPERATURE: 97 F | RESPIRATION RATE: 22 BRPM | HEART RATE: 84 BPM | OXYGEN SATURATION: 100 % | SYSTOLIC BLOOD PRESSURE: 140 MMHG

## 2022-03-23 DIAGNOSIS — L97.822 NON-PRESSURE CHRONIC ULCER OF OTHER PART OF LEFT LOWER LEG WITH FAT LAYER EXPOSED (HCC): ICD-10-CM

## 2022-03-23 DIAGNOSIS — R60.0 LOCALIZED EDEMA: ICD-10-CM

## 2022-03-23 DIAGNOSIS — I87.312 IDIOPATHIC CHRONIC VENOUS HYPERTENSION OF LEFT LOWER EXTREMITY WITH ULCER (HCC): ICD-10-CM

## 2022-03-23 DIAGNOSIS — L97.522 ULCER OF LEFT FOOT, WITH FAT LAYER EXPOSED (HCC): ICD-10-CM

## 2022-03-23 DIAGNOSIS — L97.929 IDIOPATHIC CHRONIC VENOUS HYPERTENSION OF LEFT LOWER EXTREMITY WITH ULCER (HCC): ICD-10-CM

## 2022-03-23 PROBLEM — L97.412 ULCER OF RIGHT HEEL, WITH FAT LAYER EXPOSED (HCC): Status: ACTIVE | Noted: 2022-03-23

## 2022-03-23 PROCEDURE — 74011000250 HC RX REV CODE- 250: Performed by: SPECIALIST

## 2022-03-23 PROCEDURE — 29581 APPL MULTLAYER CMPRN SYS LEG: CPT

## 2022-03-23 PROCEDURE — 11042 DBRDMT SUBQ TIS 1ST 20SQCM/<: CPT

## 2022-03-23 RX ORDER — LIDOCAINE HYDROCHLORIDE 20 MG/ML
15 SOLUTION OROPHARYNGEAL AS NEEDED
Status: DISCONTINUED | OUTPATIENT
Start: 2022-03-23 | End: 2022-03-25 | Stop reason: HOSPADM

## 2022-03-23 NOTE — DISCHARGE INSTRUCTIONS
Discharge Instructions  [address] [place] [address], [address] [place] [address]  Telephone: [phone]    NAME:  Glen Longo OF BIRTH:  1955  MEDICAL RECORD NUMBER:  [id number]  DATE: 03/23/2022      Return Appointment:  [] Dressing supply provider:   [x] Home Healthcare: [address]    [x] Return Appointment:  2 weeks  [] Nurse Visit:       [] Discharge from [facility]  [] Ordered tests:    [x] Referrals: patient seeing Dr. Juan Carlos Zapata also  [x] Rx:   Home Health to order JUXTI lites    Wound Cleansing:   Do not scrub or use excessive force. Cleanse wound prior to applying a clean dressing with:  [x] Normal Saline   [x] Mild Soap & Water    [] Keep Wound Dry in Shower  [] Other:      Topical Treatments:  Do not apply lotions, creams, or ointments to wound bed unless directed. [] Apply moisturizing lotion to skin surrounding the wound prior to dressing change.  [] Apply antifungal ointment to skin surrounding the wound prior to dressing change.  [] Apply thin film of moisture barrier ointment to skin immediately around wound. [] Other:  Betadine to israel-wound     Dressings:           Wound Location Left leg and right heel  [x] Apply Primary Dressing:       [] MediHoney Gel    [] MediHoney Alginate               [] Calcium Alginate      [] Calcium Alginate with Silver   [] Collagen                   [] Collagen with Silver                [] Santyl with Moistened saline gauze              [] Hydrofera Blue (cut to size and moistened)  [] Hydrofera Blue Ready (Cut to size)   [] NS wet to dry    [] Betadine wet to dry              [] Hydrogel                [] Mepitel     [] Bactroban/Mupirocin               [x] Other: xeroform      [x] Cover and Secure with:     [x] Gauze [] Annamaria Baker [] Kerlix   [] Foam [] Super Absorbant [x] ABD     [] ACE Wrap            [] Other:    Avoid contact of tape with skin.     [x] Change dressing: [] Daily    [] Every Other Day [] Once per week   [] Twice per week   [x] Three times per week [] Do Not Change Dressing   [] Other:     Compression:  Apply: [x] Multilayer Compression Wrap: [x]RightLeg [x]Left Leg                                 []Profore  [x]coflex lite             []Unna     [] Do not get leg(s) with wrap wet. If wraps become too tight call the center or completely remove the wrap. [x] Elevate leg(s) above the level of the heart when sitting. [x] Avoid prolonged standing in one place. Dietary:  [x] Diet as tolerated: [] Calorie Diabetic Diet: [x] No Added Salt:  [] Increase Protein: [] Other:     Activity:  [x] Activity as tolerated:    [] Patient has no activity restrictions       [] Strict Bedrest:   [] Remain off Work:       [] May return to full duty work:                                     [] Return to work with restrictions:               [address] Information: Should you experience any significant changes in your wound(s) or have questions about your wound care, please contact Thais Doyle [address] at [place] [address] 8:00 am - 4:30. If you need help with your wound outside of these hours and cannot wait until we are again available, contact your PCP or go to the hospital emergency room. PLEASE NOTE: IF YOU ARE UNABLE TO OBTAIN WOUND SUPPLIES, CONTINUE TO USE THE SUPPLIES YOU HAVE AVAILABLE UNTIL YOU ARE ABLE TO REACH US. IT IS MOST IMPORTANT TO KEEP THE WOUND COVERED AT ALL TIMES.     [x] Dr. Madeline Ford   [] Dr. Hao Curran  []  [facility]  [] Dr. Kenneth Ocampo

## 2022-03-23 NOTE — WOUND CARE
Discharge Instructions  [address] [place] [address], [address] [place] [address]  Telephone: [phone]    NAME:  Thom Wright OF BIRTH:  1955  MEDICAL RECORD NUMBER:  [id number]  DATE: 03/23/2022      Return Appointment:  [] Dressing supply provider:   [x] Home Healthcare: [address]    [x] Return Appointment:  2 weeks  [] Nurse Visit:       [] [facility]charge from [facility]  [] Ordered tests:    [x] Referrals: patient seeing Dr. Christina Teresa also  [x] Rx:   Home Health to order JUXTI lites    Wound Cleansing:   Do not scrub or use excessive force. Cleanse wound prior to applying a clean dressing with:  [x] Normal Saline   [x] Mild Soap & Water    [] Keep Wound Dry in Shower  [] Other:      Topical Treatments:  Do not apply lotions, creams, or ointments to wound bed unless directed. [] Apply moisturizing lotion to skin surrounding the wound prior to dressing change.  [] Apply antifungal ointment to skin surrounding the wound prior to dressing change.  [] Apply thin film of moisture barrier ointment to skin immediately around wound. [] Other:  Betadine to israel-wound     Dressings:           Wound Location Left leg and right heel  [x] Apply Primary Dressing:       [] MediHoney Gel    [] MediHoney Alginate               [] Calcium Alginate      [] Calcium Alginate with Silver   [] Collagen                   [] Collagen with Silver                [] Santyl with Moistened saline gauze              [] Hydrofera Blue (cut to size and moistened)  [] Hydrofera Blue Ready (Cut to size)   [] NS wet to dry    [] Betadine wet to dry              [] Hydrogel                [] Mepitel     [] Bactroban/Mupirocin               [x] Other: xeroform      [x] Cover and Secure with:     [x] Gauze [] Karla [place] [] Kerlix   [] Foam [] Super Absorbant [x] ABD     [] ACE Wrap            [] Other:    Avoid contact of tape with skin.     [x] Change dressing: [] Daily    [] Every Other Day [] Once per week   [] Twice per week   [x] Three times per week [] Do Not Change Dressing   [] Other:     Compression:  Apply: [x] Multilayer Compression Wrap: [x]RightLeg [x]Left Leg                                 []Profore  [x]coflex lite             []Unna     [] Do not get leg(s) with wrap wet. If wraps become too tight call the center or completely remove the wrap. [x] Elevate leg(s) above the level of the heart when sitting. [x] Avoid prolonged standing in one place. Dietary:  [x] Diet as tolerated: [] Calorie Diabetic Diet: [x] No Added Salt:  [] Increase Protein: [] Other:     Activity:  [x] Activity as tolerated:    [] Patient has no activity restrictions       [] Strict Bedrest:   [] Remain off Work:       [] May return to full duty work:                                     [] Return to work with restrictions:               [address] Information: Should you experience any significant changes in your wound(s) or have questions about your wound care, please contact Thais Doyle 26 at [place] [address] 8:00 am - 4:30. If you need help with your wound outside of these hours and cannot wait until we are again available, contact your PCP or go to the hospital emergency room. PLEASE NOTE: IF YOU ARE UNABLE TO OBTAIN WOUND SUPPLIES, CONTINUE TO USE THE SUPPLIES YOU HAVE AVAILABLE UNTIL YOU ARE ABLE TO REACH US. IT IS MOST IMPORTANT TO KEEP THE WOUND COVERED AT ALL TIMES.     [x] Dr. Tracy Khan   [] Dr. Winston Sifuentes  [] [facility]  [] Dr. Antonio Green

## 2022-03-23 NOTE — WOUND CARE
Multilayer Compression Wrap   (Not Unna) Below the Knee    NAME:  Glen Longo OF BIRTH:  1955  MEDICAL RECORD NUMBER:  [id number]  DATE:  3/23/2022    Removed old Multilayer wrap if indicated and wash leg with mild soap/water. Applied primary and secondary dressing as ordered. Applied multilayered dressing below the knee to right lower leg. Applied multilayered dressing below the knee to left lower leg. Instructed patient/caregiver not to remove dressing and to keep it clean and dry. Instructed patient/caregiver on complications to report to provider, such as pain, numbness in toes, heavy drainage, and slippage of dressing. Instructed patient on purpose of compression dressing and on activity and exercise recommendations.     Response to treatment: Well tolerated by patient       Electronically signed by Lissette Holm RN on 3/23/2022 at 2:34 PM

## 2022-03-23 NOTE — WOUND CARE
[address]   Progress Note and Procedure Note     [address] RECORD NUMBER:  [id number]  AGE: 78 y.o. RACE WHITE/NON-  GENDER: male  : 1955  EPISODE DATE:  3/23/2022    Subjective:   New wound R heel; no fever, some drainage    Chief Complaint   Patient presents with    Wound Check     L leg and L foot, R foot         HISTORY of PRESENT ILLNESS HPI    Pepper Urias is a 78 y.o. male who presents today for wound/ulcer evaluation.    History of Wound Context: BLE  Wound/Ulcer Pain Timing/Severity: none  Quality of pain: aching  Severity:  1 / 10   Modifying Factors: None  Associated Signs/Symptoms: none    Ulcer Identification:  Ulcer Type: venous    Contributing Factors: lymphedema    Wound: BLE         PAST MEDICAL HISTORY    Past Medical History:   Diagnosis Date    Arthritis     CAD (coronary artery disease)     Chronic obstructive pulmonary disease (HCC)     Diabetes ([facility]     Gout     Hypertension     Ill-defined condition     Sleep apnea         PAST SURGICAL HISTORY    Past Surgical History:   Procedure Laterality Date    HX ORTHOPAEDIC      HX PACEMAKER      HX VEIN ABLATION ADHESIVE         FAMILY HISTORY    Family History   Problem Relation Age of Onset    Heart Disease Mother     Kidney Disease Mother     Hypertension Mother     Diabetes Mother     Heart Disease Father     Hypertension Father     Diabetes Sister     Diabetes Brother        SOCIAL HISTORY    Social History     Tobacco Use    Smoking status: Never Smoker    Smokeless tobacco: Never Used   Vaping Use    Vaping Use: Never used   Substance Use Topics    Alcohol use: Not Currently    Drug use: Never       ALLERGIES    Allergies   Allergen Reactions    Elavil Angioedema    Naproxen Unknown (comments)     Pt not sure of reaction    Sulfa (Sulfonamide Antibiotics) Nausea and Vomiting       MEDICATIONS    Current Outpatient Medications on File Prior to Encounter Medication Sig Dispense Refill    insulin glargine,hum.rec.anlog (TOUJEO MAX U-300 SOLOSTAR SC) 80 Units by SubCUTAneous route two (2) times a day. 80 units in am, 34 units in pm      b complex vitamins tablet Take 1 Tablet by mouth daily.  Entresto 24-26 mg tablet Take 2 Tablets by mouth.  cloNIDine HCL (CATAPRES) 0.1 mg tablet Take 1 Tablet by mouth every twelve (12) hours.  ammonium lactate (AMLACTIN) 12 % topical cream Apply  to affected area as needed for Other (xerosis). rub in to affected area well  Indications: dry skin 280 g 0    vitamin E, dl,tocopheryl acet, (vitamin E acetate) 400 unit capsule Take 2 Capsules by mouth two (2) times a day. 100 Capsule 0    ascorbic acid, vitamin C, (Vitamin C) 500 mg tablet Take 1,000 mg by mouth two (2) times a day. Indications: inadequate vitamin C      tamsulosin (Flomax) 0.4 mg capsule Take 0.4 mg by mouth daily.  tiotropium (Spiriva with HandiHaler) 18 mcg inhalation capsule Take 1 Capsule by inhalation daily.  bumetanide (BUMEX) 2 mg tablet Take 2 mg by mouth two (2) times a day.  ferrous sulfate (Iron) 325 mg (65 mg iron) tablet Take  by mouth Daily (before breakfast).  aspirin 81 mg chewable tablet Take 81 mg by mouth daily.  flunisolide (NASAREL) 25 mcg (0.025 %) spry 2 Sprays two (2) times a day.  albuterol (ProAir HFA) 90 mcg/actuation inhaler Take  by inhalation.  colchicine 0.6 mg tablet Take 0.6 mg by mouth daily.  gabapentin (NEURONTIN) 300 mg capsule Take 300 mg by mouth four (4) times daily.  oxyCODONE IR (ROXICODONE) 5 mg immediate release tablet Take 5 mg by mouth every twelve (12) hours.  metFORMIN (GLUCOPHAGE) 500 mg tablet Take 1,000 mg by mouth two (2) times daily (with meals).  insulin lispro (HumaLOG U-100 Insulin) 100 unit/mL injection by SubCUTAneous route. Sliding scale       No current facility-administered medications on file prior to encounter.        REVIEW OF SYSTEMS    Pertinent items are noted in HPI. Objective:     Visit Vitals  BP (!) 140/73 (BP 1 Location: Left upper arm, BP Patient Position: At rest;Sitting)   Pulse 84   Temp 97 °F (36.1 °C)   Resp 22   SpO2 100%       Wt Readings from Last 3 Encounters:   12/20/21 (!) 169 kg (372 lb 9.6 oz)   11/08/21 (!) 168.6 kg (371 lb 12.8 oz)   10/25/21 (!) 168.3 kg (371 lb)       PHYSICAL EXAM  Right medial heel, there is a new wound, superficial, with maceration of the skin more inferiorly; there is no evidence of active infection  Lead to wound on the left lateral leg are superficial with healthy granulation tissue and no evidence of active infection  Pertinent items are noted in HPI. Assessment:    new wound R heel, improvement Lleg wounds  Problem List Items Addressed This Visit     Idiopathic chronic venous hypertension of left lower extremity with ulcer (HCC)    Localized edema    Non-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 ([address]    Ulcer of left foot, with fat layer exposed ([address]          Wound Leg lower Left;Lateral #1 (Active)   Wound Image   03/23/22 1410   Wound Etiology Venous 03/23/22 1410   Dressing Status Clean;Dry; Intact 03/23/22 1410   Cleansed Cleansed with saline 03/23/22 1410   Wound Length (cm) 10.5 cm 03/23/22 1410   Wound Width (cm) 7.5 cm 03/23/22 1410   Wound Depth (cm) 0.1 cm 03/23/22 1410   Wound Surface Area (cm^2) 78.75 cm^2 03/23/22 1410   Change in Wound Size % 28.41 03/23/22 1410   Wound Volume (cm^3) 7.875 cm^3 03/23/22 1410   Wound Healing % 28 03/23/22 1410   Post-Procedure Length (cm) 11.5 cm 03/16/22 1412   Post-Procedure Width (cm) 8 cm 03/16/22 1412   Post-Procedure Depth (cm) 0.1 cm 03/16/22 1412   Post-Procedure Surface Area (cm^2) 92 cm^2 03/16/22 1412   Post-Procedure Volume (cm^3) 9.2 cm^3 03/16/22 1412   Wound Assessment Slough;Pink/red;Granulation tissue 03/23/22 1410   Drainage Amount Large 03/23/22 1410   Drainage Description Serosanguinous 03/23/22 1410   Wound Odor None 03/23/22 1410   Alissa-Wound/Incision Assessment Fragile; Maceration 03/23/22 1410   Edges Attached edges 03/23/22 1410   Wound Thickness Description Full thickness 03/23/22 1410   Number of days: 21       Wound Heel Right #2 (Active)   Wound Image   03/23/22 1410   Wound Etiology Venous 03/23/22 1410   Dressing Status Clean;Dry; Intact 03/23/22 1410   Cleansed Cleansed with saline 03/23/22 1410   Wound Length (cm) 1.9 cm 03/23/22 1410   Wound Width (cm) 2.3 cm 03/23/22 1410   Wound Depth (cm) 0.1 cm 03/23/22 1410   Wound Surface Area (cm^2) 4.37 cm^2 03/23/22 1410   Wound Volume (cm^3) 0.437 cm^3 03/23/22 1410   Post-Procedure Length (cm) 1.9 cm 03/23/22 1418   Post-Procedure Width (cm) 2.3 cm 03/23/22 1418   Post-Procedure Depth (cm) 0.1 cm 03/23/22 1418   Post-Procedure Surface Area (cm^2) 4.37 cm^2 03/23/22 1418   Post-Procedure Volume (cm^3) 0.437 cm^3 03/23/22 1418   Wound Assessment Granulation tissue;Pink/red;Slough 03/23/22 1410   Drainage Amount Moderate 03/23/22 1410   Drainage Description Serosanguinous 03/23/22 1410   Wound Odor None 03/23/22 1410   Alissa-Wound/Incision Assessment Maceration 03/23/22 1410   Edges Attached edges 03/23/22 1410   Wound Thickness Description Partial thickness 03/23/22 1410   Number of days: 0       POP IN PROCEDURE TYPE (DEBRIDEMENT, BIOPSY, I&D, SKIN SUB, CHEMICAL CAUERTY)     Plan:   Xeroform to all wounds, Coflex lite    Treatment Note please see attached Discharge Instructions    Written patient dismissal instructions given to patient or POA.          Electronically signed by Thuy Simental MD on 3/23/2022 at 2:25 PM              Debridement Wound Care        Problem List Items Addressed This Visit     Idiopathic chronic venous hypertension of left lower extremity with ulcer ([address]    Localized edema    Non-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 ([address]    Ulcer of left foot, with fat layer exposed [facility])          Procedure Note  Indications:  Based on my examination of this patient's wound(s)/ulcer(s) today, debridement is required to promote healing and evaluate the wound base. Performed by: Honey Jose MD    Consent obtained: Yes    Time out taken: Yes    Debridement: Excisional    Using curette the wound(s)/ulcer(s) was/were sharply debrided down through and including the removal of    subcutaneous tissue    Devitalized Tissue Debrided: slough    Pre Debridement Measurements:  Are located in the Wound/Ulcer Documentation Flow Sheet    Non-Pressure ulcer, fat layer exposed    Wound/Ulcer #: 2    Post Debridement Measurements:  Wound/Ulcer Descriptions are Pre Debridement except measurements:    Wound Leg lower Left;Lateral #1 (Active)   Wound Image   03/23/22 1410   Wound Etiology Venous 03/23/22 1410   Dressing Status Clean;Dry; Intact 03/23/22 1410   Cleansed Cleansed with saline 03/23/22 1410   Wound Length (cm) 10.5 cm 03/23/22 1410   Wound Width (cm) 7.5 cm 03/23/22 1410   Wound Depth (cm) 0.1 cm 03/23/22 1410   Wound Surface Area (cm^2) 78.75 cm^2 03/23/22 1410   Change in Wound Size % 28.41 03/23/22 1410   Wound Volume (cm^3) 7.875 cm^3 03/23/22 1410   Wound Healing % 28 03/23/22 1410   Post-Procedure Length (cm) 11.5 cm 03/16/22 1412   Post-Procedure Width (cm) 8 cm 03/16/22 1412   Post-Procedure Depth (cm) 0.1 cm 03/16/22 1412   Post-Procedure Surface Area (cm^2) 92 cm^2 03/16/22 1412   Post-Procedure Volume (cm^3) 9.2 cm^3 03/16/22 1412   Wound Assessment Slough;Pink/red;Granulation tissue 03/23/22 1410   Drainage Amount Large 03/23/22 1410   Drainage Description Serosanguinous 03/23/22 1410   Wound Odor None 03/23/22 1410   Alissa-Wound/Incision Assessment Fragile; Maceration 03/23/22 1410   Edges Attached edges 03/23/22 1410   Wound Thickness Description Full thickness 03/23/22 1410   Number of days: 21       Wound Heel Right #2 (Active)   Wound Image   03/23/22 1410 Wound Etiology Venous 03/23/22 1410   Dressing Status Clean;Dry; Intact 03/23/22 1410   Cleansed Cleansed with saline 03/23/22 1410   Wound Length (cm) 1.9 cm 03/23/22 1410   Wound Width (cm) 2.3 cm 03/23/22 1410   Wound Depth (cm) 0.1 cm 03/23/22 1410   Wound Surface Area (cm^2) 4.37 cm^2 03/23/22 1410   Wound Volume (cm^3) 0.437 cm^3 03/23/22 1410   Post-Procedure Length (cm) 1.9 cm 03/23/22 1418   Post-Procedure Width (cm) 2.3 cm 03/23/22 1418   Post-Procedure Depth (cm) 0.1 cm 03/23/22 1418   Post-Procedure Surface Area (cm^2) 4.37 cm^2 03/23/22 1418   Post-Procedure Volume (cm^3) 0.437 cm^3 03/23/22 1418   Wound Assessment Granulation tissue;Pink/red;Slough 03/23/22 1410   Drainage Amount Moderate 03/23/22 1410   Drainage Description Serosanguinous 03/23/22 1410   Wound Odor None 03/23/22 1410   Alissa-Wound/Incision Assessment Maceration 03/23/22 1410   Edges Attached edges 03/23/22 1410   Wound Thickness Description Partial thickness 03/23/22 1410   Number of days: 0        Total Surface Area Debrided:  4.37 sq cm     Estimated Blood Loss:  Minimal     Hemostasis Achieved: Pressure    Procedural Pain: 2 / 10     Post Procedural Pain: 1 / 10     Response to treatment: Well tolerated by patient

## 2022-03-24 PROBLEM — L97.412 ULCER OF RIGHT HEEL, WITH FAT LAYER EXPOSED (HCC): Status: ACTIVE | Noted: 2022-03-23

## 2022-04-06 ENCOUNTER — HOSPITAL ENCOUNTER (OUTPATIENT)
Dept: WOUND CARE | Age: 67
Discharge: HOME | End: 2022-04-06
Payer: MEDICARE

## 2022-04-06 VITALS
HEART RATE: 85 BPM | DIASTOLIC BLOOD PRESSURE: 76 MMHG | OXYGEN SATURATION: 100 % | SYSTOLIC BLOOD PRESSURE: 150 MMHG | RESPIRATION RATE: 22 BRPM | TEMPERATURE: 98.2 F

## 2022-04-06 PROBLEM — L97.312 ANKLE ULCER, RIGHT, WITH FAT LAYER EXPOSED (HCC): Status: ACTIVE | Noted: 2022-04-06

## 2022-04-06 PROBLEM — L97.222 CALF ULCER, LEFT, WITH FAT LAYER EXPOSED (HCC): Status: ACTIVE | Noted: 2022-04-06

## 2022-04-06 PROCEDURE — 87186 SC STD MICRODIL/AGAR DIL: CPT

## 2022-04-06 PROCEDURE — 11042 DBRDMT SUBQ TIS 1ST 20SQCM/<: CPT

## 2022-04-06 PROCEDURE — 11045 DBRDMT SUBQ TISS EACH ADDL: CPT

## 2022-04-06 PROCEDURE — 87147 CULTURE TYPE IMMUNOLOGIC: CPT

## 2022-04-06 PROCEDURE — 87205 SMEAR GRAM STAIN: CPT

## 2022-04-06 PROCEDURE — 74011000250 HC RX REV CODE- 250: Performed by: FAMILY MEDICINE

## 2022-04-06 PROCEDURE — 87077 CULTURE AEROBIC IDENTIFY: CPT

## 2022-04-06 RX ORDER — LIDOCAINE HYDROCHLORIDE 20 MG/ML
15 SOLUTION OROPHARYNGEAL AS NEEDED
Status: DISCONTINUED | OUTPATIENT
Start: 2022-04-06 | End: 2022-04-08 | Stop reason: HOSPADM

## 2022-04-06 NOTE — WOUND CARE
Compression [address] [facility] [address] [facility] [address] f: [phone] [phone]: [phone] [email or web address]     Ordering Center:     [address] [place] [address] [address] [place] [address] [phone]  [address] [place] [address] NUMBER [phone]    Patient Information:     Adelaide Ram   [address] [phone] (home)     : 1955  AGE: 78 y.o. GENDER: male  TODAYS DATE: 2022    Insurance:     PRIMARY INSURANCE    [id number] - (Medicare)  Payor/Plan Subscr  Sex Relation Sub. Ins. ID Effective Group Num   1. [place] -* Socorro Divers 1955 Male Self [id number] 3/1/1988                                    [id number]   2. [place] (MEDICThomasina Shove 1955 Male Self [id number] 3/1/1988                                    [address] - MAIL CODE [id number]        Patient Wound Information:     Hospital Problems  Date Reviewed: 3/23/2022    None          WOUNDS REQUIRING DRESSING SUPPLIES    Wound Leg lower Left;Lateral #1 (Active)   Wound Image   22 0856   Wound Etiology Venous 22 0856   Dressing Status Clean;Dry; Intact 22 0856   Cleansed Soap and water 22 0856   Wound Length (cm) 10.5 cm 22 0856   Wound Width (cm) 8 cm 22 0856   Wound Depth (cm) 0.1 cm 22 0856   Wound Surface Area (cm^2) 84 cm^2 22 0856   Change in Wound Size % 23.64 22 0856   Wound Volume (cm^3) 8.4 cm^3 22 0856   Wound Healing % 24 22 0856   Post-Procedure Length (cm) 10.5 cm 22 0906   Post-Procedure Width (cm) 8 cm 22 0906   Post-Procedure Depth (cm) 0.3 cm 22   Post-Procedure Surface Area (cm^2) 84 cm^2 2206   Post-Procedure Volume (cm^3) 25.2 cm^3 22   Wound Assessment Slough;Pink/red;Granulation tissue;Eschar moist 22   Drainage Amount Large 22 Drainage Description Serosanguinous 04/06/22 0856   Wound Odor None 04/06/22 0856   Alissa-Wound/Incision Assessment Fragile; Maceration 04/06/22 0856   Edges Attached edges 04/06/22 0856   Wound Thickness Description Full thickness 04/06/22 0856   Number of days: 35       Wound Heel Right #2 (Active)   Wound Image   04/06/22 0855   Wound Etiology Venous 04/06/22 0855   Dressing Status Clean;Dry; Intact 04/06/22 0855   Cleansed Soap and water 04/06/22 0855   Wound Length (cm) 0.7 cm 04/06/22 0855   Wound Width (cm) 4.2 cm 04/06/22 0855   Wound Depth (cm) 0.1 cm 04/06/22 0855   Wound Surface Area (cm^2) 2.94 cm^2 04/06/22 0855   Change in Wound Size % 32.72 04/06/22 0855   Wound Volume (cm^3) 0.294 cm^3 04/06/22 0855   Wound Healing % 33 04/06/22 0855   Post-Procedure Length (cm) 0.7 cm 04/06/22 0906   Post-Procedure Width (cm) 4.2 cm 04/06/22 0906   Post-Procedure Depth (cm) 0.2 cm 04/06/22 0906   Post-Procedure Surface Area (cm^2) 2.94 cm^2 04/06/22 0906   Post-Procedure Volume (cm^3) 0.588 cm^3 04/06/22 0906   Wound Assessment Granulation tissue;Pink/red;Slough 04/06/22 0855   Drainage Amount Moderate 04/06/22 0855   Drainage Description Serosanguinous 04/06/22 0855   Wound Odor None 04/06/22 0855   Alissa-Wound/Incision Assessment Intact 04/06/22 0855   Edges Attached edges 04/06/22 0855   Wound Thickness Description Partial thickness 04/06/22 0855   Number of days: 14       Right Leg Measurements: (ALL measurements are in cm)    Calf: 48cm  Ankle: 28.5cm  Heel to back of knee: 42.5cm    Left Leg Measurements: (ALL measurements are in cm)    Calf: 44cm  Ankle: 29cm  Heel to back of knee:  42cm    Supplies Requested :     Medicare Requirements  Patient must have a qualifying Active Venus Ulcer, if ordering Bilateral Compression, Wounds MUST be present on both legs for Medicare Coverage. The patient can Not be on home health or have had a Medicare part A stay in the past 24 hours.     Patient Wound(s) Debrided: [x] Yes if yes please add date 4/6/22   [] No    Debribement Type: Excisional/Sharp    Patient currently being seen by Home Health: [x] Yes [address] (refused to order)   [] No     Compression Type: Circaid Juxtalite, Original, 30-40 mm/Hg, BILATERAL lower legs     Provider Information:      PROVIDER'S NAME: Dr. Vanessa Quintana

## 2022-04-06 NOTE — WOUND CARE
Multilayer Compression Wrap   (Not Unna) Below the Knee    NAME:  Mayra Reyez OF BIRTH:  1955  MEDICAL RECORD NUMBER:  [id number]  DATE:  4/6/2022    Removed old Multilayer wrap if indicated and wash leg with mild soap/water. Applied primary and secondary dressing as ordered. Applied multilayered dressing below the knee to right lower leg. Applied multilayered dressing below the knee to left lower leg. Instructed patient/caregiver not to remove dressing and to keep it clean and dry. Instructed patient/caregiver on complications to report to provider, such as pain, numbness in toes, heavy drainage, and slippage of dressing. Instructed patient on purpose of compression dressing and on activity and exercise recommendations.     Response to treatment: Well tolerated by patient       Electronically signed by Sandoval Krishnan RN on 4/6/2022 at 9:13 AM

## 2022-04-06 NOTE — DISCHARGE INSTRUCTIONS
Discharge Instructions  [address] [place] [address], [address] [place] [address]  Telephone: [phone]    NAME:  Brina Valiente OF BIRTH:  1955  MEDICAL RECORD NUMBER:  [id number]  DATE: 04/06/2022      Return Appointment:  [] Dressing supply provider:   [x] Home Healthcare: [address]    [x] Return Appointment:  1 weeks  [] Nurse Visit:       [] [facility]  [] Ordered tests:    [x] Referrals: patient was seeing Dr. Gabe Noriega   [] Rx:        Wound Cleansing:   Do not scrub or use excessive force. Cleanse wound prior to applying a clean dressing with:  [x] Normal Saline   [x] Mild Soap & Water    [] Keep Wound Dry in Shower  [] Other:      Topical Treatments:  Do not apply lotions, creams, or ointments to wound bed unless directed. [] Apply moisturizing lotion to skin surrounding the wound prior to dressing change.  [] Apply antifungal ointment to skin surrounding the wound prior to dressing change.  [] Apply thin film of moisture barrier ointment to skin immediately around wound. [] Other:  Betadine to israel-wound     Dressings:           Wound Location Left leg and right heel  [x] Apply Primary Dressing:       [] MediHoney Gel    [] MediHoney Alginate               [] Calcium Alginate      [x] Calcium Alginate with Silver   [] Collagen                   [] Collagen with Silver                [] Santyl with Moistened saline gauze              [] Hydrofera Blue (cut to size and moistened)  [] Hydrofera Blue Ready (Cut to size)   [] NS wet to dry    [] Betadine wet to dry              [] Hydrogel                [] Mepitel     [] Bactroban/Mupirocin               [x] Other:      [x] Cover and Secure with:     [x] Gauze [] Daniel Hummingbird [] Kerlix   [] Foam [] Super Absorbant [x] ABD     [] ACE Wrap            [] Other:    Avoid contact of tape with skin.     [x] Change dressing: [] Daily    [] Every Other Day [] Once per week   [] Twice per week   [x] Three times per week [] Do Not Change Dressing   [] Other:     Compression:  Apply: [x] Multilayer Compression Wrap: [x]RightLeg [x]Left Leg                                 []Profore  [x]coflex lite             []Unna     [] Do not get leg(s) with wrap wet. If wraps become too tight call the center or completely remove the wrap. [x] Elevate leg(s) above the level of the heart when sitting. [x] Avoid prolonged standing in one place. Dietary:  [x] Diet as tolerated: [] Calorie Diabetic Diet: [x] No Added Salt:  [] Increase Protein: [] Other:     Activity:  [x] Activity as tolerated:    [] Patient has no activity restrictions       [] Strict Bedrest:   [] Remain off Work:       [] May return to full duty work:                                     [] Return to work with restrictions:               [facility] Information: Should you experience any significant changes in your wound(s) or have questions about your wound care, please contact Thais Doyle 26 at [place] [address] 8:00 am - 4:30. If you need help with your wound outside of these hours and cannot wait until we are again available, contact your PCP or go to the hospital emergency room. PLEASE NOTE: IF YOU ARE UNABLE TO OBTAIN WOUND SUPPLIES, CONTINUE TO USE THE SUPPLIES YOU HAVE AVAILABLE UNTIL YOU ARE ABLE TO REACH US. IT IS MOST IMPORTANT TO KEEP THE WOUND COVERED AT ALL TIMES.     [] Dr. Erick Favre   [] Dr. Twin Zeng  [] [facility]  [] Dr. Shaji Schmid

## 2022-04-06 NOTE — PROGRESS NOTES
Wound Center  Progress Note / Procedure Note    Subjective:     Chief Complaint:  Valerie Rodriguez is a 78 y.o.  male  with recurrent bilateral leg wound of few months duration. HPI:     Known for wounds  New ones opened up few weeks ago  [facility]  Been getting xerform and compression    More pain in left leg wound  BS has wide range and can go up to 300      History/Chart/Medications reviewed    Wound caused by: venous, eddema, diabetes  Current wound care:See flowsheet  Appetite: good  Wound associated pain: See flowsheet  Diabetic: yes  Smoker: no  ROS: no N/V/D, no T/chills; no local rash, no chest pain or shortness of breath, no headache or dizzyness      Objective:     Physical Exam:   See flowsheet / nursing notes for vitals  Visit Vitals  BP (!) 150/76 (BP 1 Location: Right lower arm, BP Patient Position: At rest;Sitting)   Pulse 85   Temp 98.2 °F (36.8 °C)   Resp 22   SpO2 100%     General: NAD. Hygiene good  Psych: cooperative. No anxiety or depression. Normal mood and affect. Neuro: alert and oriented to person/place/situation. Otherwise nonfocal.  Derm: Normal  turgor for age, dry skin  HEENT: Normocephalic, atraumatic. EOMI. Conjunctiva clear. No scleral icterus. Neck: Normal range of motion. No masses. Chest: Respirations nonlabored  Lower extremities: color normal; temperature normal. Hair growth is not present. Calves are supple, nontender, approximately equally sized in comparison. Focussed Lower Extremity Exam:  Vascular exam:  Right lower extremity: moderate  edema, foot warm,   DP pulse : 1+  PT pulse: 0  Nails dystrophic   Left lower extremity: moderate  edema, foot warm,   DP pulse : 1+  PT pulse: 0   Nails dystrophic    Ulcer Description:   See Flowshee           Data Review:              Assessment/Plan     78 y.o. male with Dm2, recurrent venous ulcers    -left leg ulcer.   Cluster of 3  All close together  Full thickness  Slough/granular  necrotic  Necessitates debridement for wound healing and to prevent/heal infection  See below    Culture done- ulcer bigger,  More pain, mild odor    -Right leg ulcer  Below ankle, above heel  Full thickness  Slough/granular    Necessitates debridement  for wound healing and to prevent/heal infection  See below      Following discussed with patient  Needs :  Serial debridement- debrided today- see note below    Good local wound care  Dressing:D/C xeroform, start calcium alginate with silver  Frequency : three times a week       -Edema management  profore lite      Patient/understood and agrees with plan. Questions answered. Follow up with Dr Sirisha Freire in 1 week        Procedure:     Ulcer assessment: Due to presence of necrotic tissue within the wound bed, ulcer requires debridement. Procedure: Debridement:   The indication for debridement was reviewed with patient. Risks of procedure (bleeding, infection, pain) were discussed with patient/ and consent signed on first visit. Questions were answered      Subcutaneous excisional debridement left and right leg ulcers  Indication: to remove necrotic tissue/ vitalized and devitalized tissue/ infected tissue/   obtain deep wound culture left leg ulcer  through skin and subcutaneous layer of wound bed  Time out done  Consent in chart   Anesthesia: Topical  lidocaine   Instrument: curette  Residual Necrosis: Present and scored   Bleeding: <1ml   Hemostasis: Pressure   Patient tolerated procedure well   Procedural Pain: mild  Post - procedural pain: 0    Post debridement measurements:  see below  Surface area debrided: 2.94+84 sq. cm    WOUND POA CONDITIONS    Wound Leg lower Left;Lateral #1 (Active)   Wound Image   04/06/22 0856   Wound Etiology Venous 04/06/22 0856   Dressing Status Clean;Dry; Intact 04/06/22 0856   Cleansed Soap and water 04/06/22 0856   Wound Length (cm) 10.5 cm 04/06/22 0856   Wound Width (cm) 8 cm 04/06/22 0856   Wound Depth (cm) 0.1 cm 04/06/22 0856   Wound Surface Area (cm^2) 84 cm^2 04/06/22 0856   Change in Wound Size % 23.64 04/06/22 0856   Wound Volume (cm^3) 8.4 cm^3 04/06/22 0856   Wound Healing % 24 04/06/22 0856   Post-Procedure Length (cm) 10.5 cm 04/06/22 0906   Post-Procedure Width (cm) 8 cm 04/06/22 0906   Post-Procedure Depth (cm) 0.3 cm 04/06/22 0906   Post-Procedure Surface Area (cm^2) 84 cm^2 04/06/22 0906   Post-Procedure Volume (cm^3) 25.2 cm^3 04/06/22 0906   Wound Assessment Slough;Pink/red;Granulation tissue;Eschar moist 04/06/22 0856   Drainage Amount Large 04/06/22 0856   Drainage Description Serosanguinous 04/06/22 0856   Wound Odor None 04/06/22 0856   Alissa-Wound/Incision Assessment Fragile; Maceration 04/06/22 0856   Edges Attached edges 04/06/22 0856   Wound Thickness Description Full thickness 04/06/22 0856   Number of days: 35       Wound Heel Right #2 (Active)   Wound Image   04/06/22 0855   Wound Etiology Venous 04/06/22 0855   Dressing Status Clean;Dry; Intact 04/06/22 0855   Cleansed Soap and water 04/06/22 0855   Wound Length (cm) 0.7 cm 04/06/22 0855   Wound Width (cm) 4.2 cm 04/06/22 0855   Wound Depth (cm) 0.1 cm 04/06/22 0855   Wound Surface Area (cm^2) 2.94 cm^2 04/06/22 0855   Change in Wound Size % 32.72 04/06/22 0855   Wound Volume (cm^3) 0.294 cm^3 04/06/22 0855   Wound Healing % 33 04/06/22 0855   Post-Procedure Length (cm) 0.7 cm 04/06/22 0906   Post-Procedure Width (cm) 4.2 cm 04/06/22 0906   Post-Procedure Depth (cm) 0.2 cm 04/06/22 0906   Post-Procedure Surface Area (cm^2) 2.94 cm^2 04/06/22 0906   Post-Procedure Volume (cm^3) 0.588 cm^3 04/06/22 0906   Wound Assessment Granulation tissue;Pink/red;Slough 04/06/22 0855   Drainage Amount Moderate 04/06/22 0855   Drainage Description Serosanguinous 04/06/22 0855   Wound Odor None 04/06/22 0855   Alissa-Wound/Incision Assessment Intact 04/06/22 0855   Edges Attached edges 04/06/22 0855   Wound Thickness Description Full thickness 04/06/22 0855   Number of days: 14         -------    Past Medical History:   Diagnosis Date    Arthritis     CAD (coronary artery disease)     [facility]     Diabetes [facility] [address] [facility]     Gout     Hypertension     Ill-defined condition     Sleep apnea       Past Surgical History:   Procedure Laterality Date    HX ORTHOPAEDIC      HX PACEMAKER      HX VEIN ABLATION ADHESIVE       Family History   Problem Relation Age of Onset    Heart Disease Mother     Kidney Disease Mother     Hypertension Mother     Diabetes Mother     Heart Disease Father     Hypertension Father     Diabetes Sister     Diabetes Brother       Social History     Tobacco Use    Smoking status: Never Smoker    Smokeless tobacco: Never Used   Substance Use Topics    Alcohol use: Not Currently       Prior to Admission medications    Medication Sig Start Date End Date Taking? Authorizing Provider   insulin glargine,hum.rec.anlog (TOUJEO MAX U-300 SOLOSTAR SC) 80 Units by SubCUTAneous route two (2) times a day. 80 units in am, 34 units in pm   Yes Provider, Historical   b complex vitamins tablet Take 1 Tablet by mouth daily. Yes Provider, Historical   Entresto 24-26 mg tablet Take 2 Tablets by mouth. 8/10/21  Yes Provider, Historical   cloNIDine HCL (CATAPRES) 0.1 mg tablet Take 1 Tablet by mouth every twelve (12) hours. 5/27/21  Yes Provider, Historical   ammonium lactate (AMLACTIN) 12 % topical cream Apply  to affected area as needed for Other (xerosis). rub in to affected area well  Indications: dry skin 6/8/21  Yes Aleida Patel MD   vitamin E, dl,tocopheryl acet, (vitamin E acetate) 400 unit capsule Take 2 Capsules by mouth two (2) times a day. 6/8/21  Yes Aleida Patel MD   ascorbic acid, vitamin C, (Vitamin C) 500 mg tablet Take 1,000 mg by mouth two (2) times a day. Indications: inadequate vitamin C   Yes Provider, Historical   tamsulosin (Flomax) 0.4 mg capsule Take 0.4 mg by mouth daily.    Yes Provider, Historical   tiotropium (Spiriva with HandiHaler) 18 mcg inhalation capsule Take 1 Capsule by inhalation daily. Yes Provider, Historical   bumetanide (BUMEX) 2 mg tablet Take 2 mg by mouth two (2) times a day. Yes Provider, Historical   ferrous sulfate (Iron) 325 mg (65 mg iron) tablet Take  by mouth Daily (before breakfast). Yes Provider, Historical   aspirin 81 mg chewable tablet Take 81 mg by mouth daily. Yes Provider, Historical   flunisolide (NASAREL) 25 mcg (0.025 %) spry 2 Sprays two (2) times a day. Yes Provider, Historical   albuterol (ProAir HFA) 90 mcg/actuation inhaler Take  by inhalation. Yes Provider, Historical   colchicine 0.6 mg tablet Take 0.6 mg by mouth daily. Yes Provider, Historical   gabapentin (NEURONTIN) 300 mg capsule Take 300 mg by mouth four (4) times daily. Yes Provider, Historical   oxyCODONE IR (ROXICODONE) 5 mg immediate release tablet Take 5 mg by mouth every twelve (12) hours. Yes Provider, Historical   metFORMIN (GLUCOPHAGE) 500 mg tablet Take 1,000 mg by mouth two (2) times daily (with meals). Yes Provider, Historical   insulin lispro (HumaLOG U-100 Insulin) 100 unit/mL injection by SubCUTAneous route.  Sliding scale   Yes Provider, Historical     Allergies   Allergen Reactions    Elavil Angioedema    Naproxen Unknown (comments)     Pt not sure of reaction    Sulfa (Sulfonamide Antibiotics) Nausea and Vomiting        Signed By: Riddhi Elizalde MD     April 6, 2022

## 2022-04-06 NOTE — WOUND CARE
Discharge Instructions  [address] [place] [address], [address] [place] [address]  Telephone: [phone]    NAME:  Ilana Acosta OF BIRTH:  1955  MEDICAL RECORD NUMBER:  [id number]  DATE: 04/06/2022      Return Appointment:  [] Dressing supply provider:   [x] Home Healthcare: [address]    [x] Return Appointment:  1 weeks  [] Nurse Visit:       [] Discharge from [facility]  [] Ordered tests:    [x] Referrals: patient was seeing Dr. Karlo Tomas   [] Rx:        Wound Cleansing:   Do not scrub or use excessive force. Cleanse wound prior to applying a clean dressing with:  [x] Normal Saline   [x] Mild Soap & Water    [] Keep Wound Dry in Shower  [] Other:      Topical Treatments:  Do not apply lotions, creams, or ointments to wound bed unless directed. [] Apply moisturizing lotion to skin surrounding the wound prior to dressing change.  [] Apply antifungal ointment to skin surrounding the wound prior to dressing change.  [] Apply thin film of moisture barrier ointment to skin immediately around wound. [] Other:  Betadine to israel-wound     Dressings:           Wound Location Left leg and right heel  [x] Apply Primary Dressing:       [] MediHoney Gel    [] MediHoney Alginate               [] Calcium Alginate      [x] Calcium Alginate with Silver   [] Collagen                   [] Collagen with Silver                [] Santyl with Moistened saline gauze              [] Hydrofera Blue (cut to size and moistened)  [] Hydrofera Blue Ready (Cut to size)   [] NS wet to dry    [] Betadine wet to dry              [] Hydrogel                [] Mepitel     [] Bactroban/Mupirocin               [x] Other:      [x] Cover and Secure with:     [x] Gauze [] Rossi Nayeli [] Kerlix   [] Foam [] Super Absorbant [x] ABD     [] ACE Wrap            [] Other:    Avoid contact of tape with skin.     [x] Change dressing: [] Daily    [] Every Other Day [] Once per week   [] Twice per week   [x] Three times per week [] Do Not Change Dressing   [] Other:     Compression:  Apply: [x] Multilayer Compression Wrap: [x]RightLeg [x]Left Leg                                 []Profore  [x]coflex lite             []Unna     [] Do not get leg(s) with wrap wet. If wraps become too tight call the center or completely remove the wrap. [x] Elevate leg(s) above the level of the heart when sitting. [x] Avoid prolonged standing in one place. Dietary:  [x] Diet as tolerated: [] Calorie Diabetic Diet: [x] No Added Salt:  [] Increase Protein: [] Other:     Activity:  [x] Activity as tolerated:    [] Patient has no activity restrictions       [] Strict Bedrest:   [] Remain off Work:       [] May return to full duty work:                                     [] Return to work with restrictions:               [address] [facility] [address] Information: Should you experience any significant changes in your wound(s) or have questions about your wound care, please contact Thais Doyle [address] at [place] [phone] 8:00 am - 4:30. If you need help with your wound outside of these hours and cannot wait until we are again available, contact your PCP or go to the hospital emergency room. PLEASE NOTE: IF YOU ARE UNABLE TO OBTAIN WOUND SUPPLIES, CONTINUE TO USE THE SUPPLIES YOU HAVE AVAILABLE UNTIL YOU ARE ABLE TO REACH US. IT IS MOST IMPORTANT TO KEEP THE WOUND COVERED AT ALL TIMES.     [] Dr. Marlene Stock   [] Dr. Monika Dorantes  [] Edwardo Velazco [facility]  [] Dr. Lacey Burleson

## 2022-04-13 ENCOUNTER — HOSPITAL ENCOUNTER (OUTPATIENT)
Dept: WOUND CARE | Age: 67
Discharge: HOME | End: 2022-04-13
Payer: MEDICARE

## 2022-04-13 VITALS
SYSTOLIC BLOOD PRESSURE: 146 MMHG | HEART RATE: 77 BPM | TEMPERATURE: 96.9 F | DIASTOLIC BLOOD PRESSURE: 114 MMHG | RESPIRATION RATE: 22 BRPM | OXYGEN SATURATION: 98 %

## 2022-04-13 PROCEDURE — 29581 APPL MULTLAYER CMPRN SYS LEG: CPT

## 2022-04-13 PROCEDURE — 74011000250 HC RX REV CODE- 250: Performed by: SPECIALIST

## 2022-04-13 RX ORDER — LIDOCAINE HYDROCHLORIDE 20 MG/ML
15 SOLUTION OROPHARYNGEAL AS NEEDED
Status: DISCONTINUED | OUTPATIENT
Start: 2022-04-13 | End: 2022-04-15 | Stop reason: HOSPADM

## 2022-04-13 NOTE — WOUND CARE
Multilayer Compression Wrap   (Not Unna) Below the Knee    NAME:  Tanisha Jules OF BIRTH:  1955  MEDICAL RECORD NUMBER:  [id number]  DATE:  4/13/2022    Removed old Multilayer wrap if indicated and wash leg with mild soap/water. Applied primary and secondary dressing as ordered. Applied multilayered dressing below the knee to right lower leg. Applied multilayered dressing below the knee to left lower leg. Instructed patient/caregiver not to remove dressing and to keep it clean and dry. Instructed patient/caregiver on complications to report to provider, such as pain, numbness in toes, heavy drainage, and slippage of dressing. Instructed patient on purpose of compression dressing and on activity and exercise recommendations.     Response to treatment: Well tolerated by patient       Electronically signed by Isidro Carver RN on 4/13/2022 at 12:09 PM

## 2022-04-13 NOTE — DISCHARGE INSTRUCTIONS
Discharge Instructions  [address] [place] [address], [address] [place] [address]  Telephone: [phone]    NAME:  Tanisha Jules OF BIRTH:  1955  MEDICAL RECORD NUMBER:  [id number]  DATE: 04/13/2022      Return Appointment:  [] Dressing supply provider:   [x] Home Healthcare: [address]    [x] Return Appointment:  1 weeks  [] Nurse Visit:       [] Discharge from [facility]  [] Ordered tests:    [x] Referrals: patient was seeing Dr. Honey Cosby   [] Rx:        Wound Cleansing:   Do not scrub or use excessive force. Cleanse wound prior to applying a clean dressing with:  [x] Normal Saline   [x] Mild Soap & Water    [] Keep Wound Dry in Shower  [] Other:      Topical Treatments:  Do not apply lotions, creams, or ointments to wound bed unless directed. [] Apply moisturizing lotion to skin surrounding the wound prior to dressing change.  [] Apply antifungal ointment to skin surrounding the wound prior to dressing change.  [] Apply thin film of moisture barrier ointment to skin immediately around wound. [] Other:  Betadine to israel-wound     Dressings:           Wound Location Left leg and right heel  [x] Apply Primary Dressing:       [] MediHoney Gel    [] MediHoney Alginate               [] Calcium Alginate      [x] Calcium Alginate with Silver   [] Collagen                   [] Collagen with Silver                [] Santyl with Moistened saline gauze              [] Hydrofera Blue (cut to size and moistened)  [] Hydrofera Blue Ready (Cut to size)   [] NS wet to dry    [] Betadine wet to dry              [] Hydrogel                [] Mepitel     [] Bactroban/Mupirocin               [] Other:      [x] Cover and Secure with:     [x] Gauze [] Shirlyn Snare [] Kerlix   [] Foam [] Super Absorbant [x] ABD     [] ACE Wrap            [] Other:    Avoid contact of tape with skin.     [x] Change dressing: [] Daily    [] Every Other Day [] Once per week   [] Twice per week   [x] Three times per week [] Do Not Change Dressing   [] Other:     Compression:  Apply: [x] Multilayer Compression Wrap: [x]RightLeg [x]Left Leg                                 []Profore  [x]coflex lite             []Unna     [] Do not get leg(s) with wrap wet. If wraps become too tight call the center or completely remove the wrap. [x] Elevate leg(s) above the level of the heart when sitting. [x] Avoid prolonged standing in one place. Dietary:  [x] Diet as tolerated: [] Calorie Diabetic Diet: [x] No Added Salt:  [] Increase Protein: [] Other:     Activity:  [x] Activity as tolerated:    [] Patient has no activity restrictions       [] Strict Bedrest:   [] Remain off Work:       [] May return to full duty work:                                     [] Return to work with restrictions:               [address] Information: Should you experience any significant changes in your wound(s) or have questions about your wound care, please contact Thais Doyle [address] at [place] [phone] 8:00 am - 4:30. If you need help with your wound outside of these hours and cannot wait until we are again available, contact your PCP or go to the hospital emergency room. PLEASE NOTE: IF YOU ARE UNABLE TO OBTAIN WOUND SUPPLIES, CONTINUE TO USE THE SUPPLIES YOU HAVE AVAILABLE UNTIL YOU ARE ABLE TO REACH US. IT IS MOST IMPORTANT TO KEEP THE WOUND COVERED AT ALL TIMES.     [x] Dr. Hailey Engel   [] Dr. Virgil Evans  [] Jessica Suarez [facility]  [] Dr. Minnie Bustamante

## 2022-04-13 NOTE — WOUND CARE
Discharge Instructions  [address] [place] [address], [address] [place] [address]  Telephone: [phone]    NAME:  Bettye Isidro OF BIRTH:  1955  MEDICAL RECORD NUMBER:  [id number]  DATE: 04/13/2022      Return Appointment:  [] Dressing supply provider:   [x] [facility]    [x] Return Appointment:  1 weeks  [] Nurse Visit:       [] Discharge from [facility]  [] Ordered tests:    [x] Referrals: patient was seeing Dr. Loli Parmar   [] Rx:        Wound Cleansing:   Do not scrub or use excessive force. Cleanse wound prior to applying a clean dressing with:  [x] Normal Saline   [x] Mild Soap & Water    [] Keep Wound Dry in Shower  [] Other:      Topical Treatments:  Do not apply lotions, creams, or ointments to wound bed unless directed. [] Apply moisturizing lotion to skin surrounding the wound prior to dressing change.  [] Apply antifungal ointment to skin surrounding the wound prior to dressing change.  [] Apply thin film of moisture barrier ointment to skin immediately around wound. [] Other:  Betadine to israel-wound     Dressings:           Wound Location Left leg and right heel  [x] Apply Primary Dressing:       [] MediHoney Gel    [] MediHoney Alginate               [] Calcium Alginate      [x] Calcium Alginate with Silver   [] Collagen                   [] Collagen with Silver                [] Santyl with Moistened saline gauze              [] Hydrofera Blue (cut to size and moistened)  [] Hydrofera Blue Ready (Cut to size)   [] NS wet to dry    [] Betadine wet to dry              [] Hydrogel                [] Mepitel     [] Bactroban/Mupirocin               [] Other:      [x] Cover and Secure with:     [x] Gauze [] Giron Clock [] Kerlix   [] Foam [] Super Absorbant [x] ABD     [] ACE Wrap            [] Other:    Avoid contact of tape with skin.     [x] Change dressing: [] Daily    [] Every Other Day [] Once per week   [] Twice per week   [x] Three times per week [] Do Not Change Dressing   [] Other:     Compression:  Apply: [x] Multilayer Compression Wrap: [x]RightLeg [x]Left Leg                                 []Profore  [x]coflex lite             []Unna     [] Do not get leg(s) with wrap wet. If wraps become too tight call the center or completely remove the wrap. [x] Elevate leg(s) above the level of the heart when sitting. [x] Avoid prolonged standing in one place. Dietary:  [x] Diet as tolerated: [] Calorie Diabetic Diet: [x] No Added Salt:  [] Increase Protein: [] Other:     Activity:  [x] Activity as tolerated:    [] Patient has no activity restrictions       [] Strict Bedrest:   [] Remain off Work:       [] May return to full duty work:                                     [] Return to work with restrictions:               [address] [facility] Information: Should you experience any significant changes in your wound(s) or have questions about your wound care, please contact Thais Doyle [address] at [place] [phone] 8:00 am - 4:30. If you need help with your wound outside of these hours and cannot wait until we are again available, contact your PCP or go to the hospital emergency room. PLEASE NOTE: IF YOU ARE UNABLE TO OBTAIN WOUND SUPPLIES, CONTINUE TO USE THE SUPPLIES YOU HAVE AVAILABLE UNTIL YOU ARE ABLE TO REACH US. IT IS MOST IMPORTANT TO KEEP THE WOUND COVERED AT ALL TIMES.     [x] Dr. Sly Morales   [] Dr. Valentina Fernando  [] [facility]  [] Dr. Peewee Rojas

## 2022-04-13 NOTE — WOUND CARE
[address]   Progress Note and Procedure Note   NO Procedure      [address] RECORD NUMBER:  [id number]  AGE: 78 y.o. RACE WHITE/NON-  GENDER: male  : 1955  EPISODE DATE:  2022    Subjective:   Wound culture taken at last visit:   Heavy Mixed skin lily, Heavy Alcaligenes faecalis resistant to Bactrim, Cipro, Levo  No new problems reported    Chief Complaint   Patient presents with    Wound Check     R heel L lower leg         HISTORY of PRESENT ILLNESS HPI    Vidhi Amos is a 78 y.o. male who presents today for wound/ulcer evaluation.    History of Wound Context: R heel, L leg  Wound/Ulcer Pain Timing/Severity: mild  Quality of pain: aching  Severity:  2 / 10   Modifying Factors: None  Associated Signs/Symptoms: edema    Ulcer Identification:  Ulcer Type: venous    Contributing Factors: lymphedema    Wound: L leg, R heel         PAST MEDICAL HISTORY    Past Medical History:   Diagnosis Date    Arthritis     CAD (coronary artery disease)     Chronic obstructive pulmonary disease (HCC)     Diabetes ([facility] [address]     Gout     Hypertension     Ill-defined condition     Sleep apnea         PAST SURGICAL HISTORY    Past Surgical History:   Procedure Laterality Date    HX ORTHOPAEDIC      HX PACEMAKER      HX VEIN ABLATION ADHESIVE         FAMILY HISTORY    Family History   Problem Relation Age of Onset    Heart Disease Mother     Kidney Disease Mother     Hypertension Mother     Diabetes Mother     Heart Disease Father     Hypertension Father     Diabetes Sister     Diabetes Brother        SOCIAL HISTORY    Social History     Tobacco Use    Smoking status: Never Smoker    Smokeless tobacco: Never Used   Vaping Use    Vaping Use: Never used   Substance Use Topics    Alcohol use: Not Currently    Drug use: Never       ALLERGIES    Allergies   Allergen Reactions    Elavil Angioedema    Naproxen Unknown (comments)     Pt not sure of reaction    Sulfa (Sulfonamide Antibiotics) Nausea and Vomiting       MEDICATIONS    Current Outpatient Medications on File Prior to Encounter   Medication Sig Dispense Refill    insulin glargine,hum.rec.anlog (TOUJEO MAX U-300 SOLOSTAR SC) 80 Units by SubCUTAneous route two (2) times a day. 80 units in am, 34 units in pm      b complex vitamins tablet Take 1 Tablet by mouth daily.  Entresto 24-26 mg tablet Take 2 Tablets by mouth.  cloNIDine HCL (CATAPRES) 0.1 mg tablet Take 1 Tablet by mouth every twelve (12) hours.  ammonium lactate (AMLACTIN) 12 % topical cream Apply  to affected area as needed for Other (xerosis). rub in to affected area well  Indications: dry skin 280 g 0    vitamin E, dl,tocopheryl acet, (vitamin E acetate) 400 unit capsule Take 2 Capsules by mouth two (2) times a day. 100 Capsule 0    ascorbic acid, vitamin C, (Vitamin C) 500 mg tablet Take 1,000 mg by mouth two (2) times a day. Indications: inadequate vitamin C      tamsulosin (Flomax) 0.4 mg capsule Take 0.4 mg by mouth daily.  tiotropium (Spiriva with HandiHaler) 18 mcg inhalation capsule Take 1 Capsule by inhalation daily.  bumetanide (BUMEX) 2 mg tablet Take 2 mg by mouth two (2) times a day.  ferrous sulfate (Iron) 325 mg (65 mg iron) tablet Take  by mouth Daily (before breakfast).  aspirin 81 mg chewable tablet Take 81 mg by mouth daily.  flunisolide (NASAREL) 25 mcg (0.025 %) spry 2 Sprays two (2) times a day.  albuterol (ProAir HFA) 90 mcg/actuation inhaler Take  by inhalation.  colchicine 0.6 mg tablet Take 0.6 mg by mouth daily.  gabapentin (NEURONTIN) 300 mg capsule Take 300 mg by mouth four (4) times daily.  oxyCODONE IR (ROXICODONE) 5 mg immediate release tablet Take 5 mg by mouth every twelve (12) hours.  metFORMIN (GLUCOPHAGE) 500 mg tablet Take 1,000 mg by mouth two (2) times daily (with meals).       insulin lispro (HumaLOG U-100 Insulin) 100 unit/mL injection by SubCUTAneous route. Sliding scale       No current facility-administered medications on file prior to encounter. REVIEW OF SYSTEMS    Pertinent items are noted in HPI. Objective:     Visit Vitals  BP (!) 146/114 (BP 1 Location: Right lower arm, BP Patient Position: At rest;Sitting)   Pulse 77   Temp 96.9 °F (36.1 °C)   Resp 22   SpO2 98%       Wt Readings from Last 3 Encounters:   12/20/21 (!) 169 kg (372 lb 9.6 oz)   11/08/21 (!) 168.6 kg (371 lb 12.8 oz)   10/25/21 (!) 168.3 kg (371 lb)       PHYSICAL EXAM  The right medial heel wound is small, no evidence of active infection  The left leg wound is healthy-appearing, granulating, measures slightly larger, no evidence of active infection  Pertinent items are noted in HPI. Assessment:   Mild progress  Problem List Items Addressed This Visit     None          Procedure Note  Indications:  Based on my examination of this patient's wound(s)/ulcer(s) today, debridement is not required to promote healing and evaluate the wound base. Wound Leg lower Left;Lateral #1 (Active)   Wound Image   04/13/22 1131   Wound Etiology Venous 04/13/22 1131   Dressing Status Clean;Dry; Intact 04/13/22 1131   Cleansed Soap and water 04/13/22 1131   Wound Length (cm) 10.5 cm 04/13/22 1131   Wound Width (cm) 8 cm 04/13/22 1131   Wound Depth (cm) 0.1 cm 04/13/22 1131   Wound Surface Area (cm^2) 84 cm^2 04/13/22 1131   Change in Wound Size % 23.64 04/13/22 1131   Wound Volume (cm^3) 8.4 cm^3 04/13/22 1131   Wound Healing % 24 04/13/22 1131   Post-Procedure Length (cm) 10.5 cm 04/06/22 0906   Post-Procedure Width (cm) 8 cm 04/06/22 0906   Post-Procedure Depth (cm) 0.3 cm 04/06/22 0906   Post-Procedure Surface Area (cm^2) 84 cm^2 04/06/22 0906   Post-Procedure Volume (cm^3) 25.2 cm^3 04/06/22 0906   Wound Assessment Slough;Pink/red;Granulation tissue 04/13/22 1131   Drainage Amount Large 04/13/22 1131   Drainage Description Serosanguinous 04/13/22 1131   Wound Odor None 04/13/22 1131   Alissa-Wound/Incision Assessment Fragile; Maceration 04/13/22 1131   Edges Attached edges 04/13/22 1131   Wound Thickness Description Full thickness 04/13/22 1131   Number of days: 42       Wound Heel Right #2 (Active)   Wound Image   04/13/22 1132   Wound Etiology Venous 04/13/22 1132   Dressing Status Clean;Dry; Intact 04/13/22 1132   Cleansed Soap and water 04/13/22 1132   Wound Length (cm) 1.2 cm 04/13/22 1132   Wound Width (cm) 4 cm 04/13/22 1132   Wound Depth (cm) 0.1 cm 04/13/22 1132   Wound Surface Area (cm^2) 4.8 cm^2 04/13/22 1132   Change in Wound Size % -9.84 04/13/22 1132   Wound Volume (cm^3) 0.48 cm^3 04/13/22 1132   Wound Healing % -10 04/13/22 1132   Post-Procedure Length (cm) 0.7 cm 04/06/22 0906   Post-Procedure Width (cm) 4.2 cm 04/06/22 0906   Post-Procedure Depth (cm) 0.2 cm 04/06/22 0906   Post-Procedure Surface Area (cm^2) 2.94 cm^2 04/06/22 0906   Post-Procedure Volume (cm^3) 0.588 cm^3 04/06/22 0906   Wound Assessment Granulation tissue;Pink/red;Slough 04/13/22 1132   Drainage Amount Moderate 04/13/22 1132   Drainage Description Serosanguinous 04/13/22 1132   Wound Odor None 04/13/22 1132   Alissa-Wound/Incision Assessment Maceration 04/13/22 1132   Edges Attached edges 04/13/22 1132   Wound Thickness Description Full thickness 04/13/22 1132   Number of days: 21        Plan:   Continue silver cell, Coflex light compression  Hold on antibiotics at this time as bacteria are resistant to oral antibiotics    Treatment Note please see attached Discharge Instructions    Written patient dismissal instructions given to patient or POA.          Electronically signed by iKrti Moody MD on 4/13/2022 at 11:49 AM

## 2022-04-20 ENCOUNTER — HOSPITAL ENCOUNTER (OUTPATIENT)
Dept: WOUND CARE | Age: 67
Discharge: HOME | End: 2022-04-20
Payer: MEDICARE

## 2022-04-20 VITALS
DIASTOLIC BLOOD PRESSURE: 71 MMHG | SYSTOLIC BLOOD PRESSURE: 115 MMHG | HEART RATE: 91 BPM | TEMPERATURE: 97.1 F | RESPIRATION RATE: 22 BRPM

## 2022-04-20 DIAGNOSIS — L97.822 NON-PRESSURE CHRONIC ULCER OF OTHER PART OF LEFT LOWER LEG WITH FAT LAYER EXPOSED (HCC): ICD-10-CM

## 2022-04-20 DIAGNOSIS — I87.312 IDIOPATHIC CHRONIC VENOUS HYPERTENSION OF LEFT LOWER EXTREMITY WITH ULCER (HCC): ICD-10-CM

## 2022-04-20 DIAGNOSIS — L97.412 ULCER OF RIGHT HEEL, WITH FAT LAYER EXPOSED (HCC): ICD-10-CM

## 2022-04-20 DIAGNOSIS — L97.522 ULCER OF LEFT FOOT, WITH FAT LAYER EXPOSED (HCC): ICD-10-CM

## 2022-04-20 DIAGNOSIS — R60.0 LOCALIZED EDEMA: ICD-10-CM

## 2022-04-20 DIAGNOSIS — L97.929 IDIOPATHIC CHRONIC VENOUS HYPERTENSION OF LEFT LOWER EXTREMITY WITH ULCER (HCC): ICD-10-CM

## 2022-04-20 PROCEDURE — 11045 DBRDMT SUBQ TISS EACH ADDL: CPT

## 2022-04-20 PROCEDURE — 11042 DBRDMT SUBQ TIS 1ST 20SQCM/<: CPT

## 2022-04-20 PROCEDURE — 74011000250 HC RX REV CODE- 250: Performed by: SPECIALIST

## 2022-04-20 RX ORDER — LIDOCAINE HYDROCHLORIDE 20 MG/ML
15 SOLUTION OROPHARYNGEAL AS NEEDED
Status: DISCONTINUED | OUTPATIENT
Start: 2022-04-20 | End: 2022-04-22 | Stop reason: HOSPADM

## 2022-04-20 NOTE — WOUND CARE
Multilayer Compression Wrap   (Not Unna) Below the Knee    NAME:  Jose Uriarte OF BIRTH:  1955  MEDICAL RECORD NUMBER:  [id number]  DATE:  4/20/2022    Removed old Multilayer wrap if indicated and wash leg with mild soap/water. Applied moisturizing agent to dry skin as needed. Applied primary and secondary dressing as ordered. Applied multilayered dressing below the knee to right lower leg. Applied multilayered dressing below the knee to left lower leg. Instructed patient/caregiver not to remove dressing and to keep it clean and dry. Instructed patient/caregiver on complications to report to provider, such as pain, numbness in toes, heavy drainage, and slippage of dressing. Instructed patient on purpose of compression dressing and on activity and exercise recommendations.     Response to treatment: Well tolerated by patient       Electronically signed by Pao Gil LPN on 5/31/2921 at 1:48 PM

## 2022-04-20 NOTE — WOUND CARE
Discharge Instructions  [address] [place] [address], [address] [place] [address]  Telephone: [phone]    NAME:  Ilana Acosta OF BIRTH:  1955  MEDICAL RECORD NUMBER:  [id number]  DATE: 04/20/2022      Return Appointment:  [] Dressing supply provider:   [x] Home Healthcare: [address]    [x] Return Appointment:  1 weeks  [] Nurse Visit:       [] Discharge from [facility]  [] Ordered tests:    [x] Referrals: patient was seeing Dr. Karlo Tomas   [] Rx:        Wound Cleansing:   Do not scrub or use excessive force. Cleanse wound prior to applying a clean dressing with:  [x] Normal Saline   [x] Mild Soap & Water    [] Keep Wound Dry in Shower  [] Other:      Topical Treatments:  Do not apply lotions, creams, or ointments to wound bed unless directed. [] Apply moisturizing lotion to skin surrounding the wound prior to dressing change.  [] Apply antifungal ointment to skin surrounding the wound prior to dressing change.  [] Apply thin film of moisture barrier ointment to skin immediately around wound. [] Other:  Betadine to israel-wound     Dressings:           Wound Location Left leg and right heel  [x] Apply Primary Dressing:       [] MediHoney Gel    [] MediHoney Alginate               [] Calcium Alginate      [x] Calcium Alginate with Silver   [] Collagen                   [] Collagen with Silver                [] Santyl with Moistened saline gauze              [] Hydrofera Blue (cut to size and moistened)  [] Hydrofera Blue Ready (Cut to size)   [] NS wet to dry    [] Betadine wet to dry              [] Hydrogel                [] Mepitel     [] Bactroban/Mupirocin               [] Other:      [x] Cover and Secure with:     [x] Gauze [] Rossi Nayeli [] Kerlix   [] Foam [] Super Absorbant [x] ABD     [] ACE Wrap            [] Other:    Avoid contact of tape with skin.     [x] Change dressing: [] Daily    [] Every Other Day [] Once per week   [] Twice per week   [x] Three times per week [] Do Not Change Dressing   [] Other:     Compression:  Apply: [x] Multilayer Compression Wrap: [x]RightLeg [x]Left Leg                                 []Profore  [x]coflex lite             []Unna     [] Do not get leg(s) with wrap wet. If wraps become too tight call the center or completely remove the wrap. [x] Elevate leg(s) above the level of the heart when sitting. [x] Avoid prolonged standing in one place. Dietary:  [x] Diet as tolerated: [] Calorie Diabetic Diet: [x] No Added Salt:  [] Increase Protein: [] Other:     Activity:  [x] Activity as tolerated:    [] Patient has no activity restrictions       [] Strict Bedrest:   [] Remain off Work:       [] May return to full duty work:                                     [] Return to work with restrictions:               [facility] Information: Should you experience any significant changes in your wound(s) or have questions about your wound care, please contact [address] at [place] [phone] 8:00 am - 4:30. If you need help with your wound outside of these hours and cannot wait until we are again available, contact your PCP or go to the hospital emergency room. PLEASE NOTE: IF YOU ARE UNABLE TO OBTAIN WOUND SUPPLIES, CONTINUE TO USE THE SUPPLIES YOU HAVE AVAILABLE UNTIL YOU ARE ABLE TO REACH US. IT IS MOST IMPORTANT TO KEEP THE WOUND COVERED AT ALL TIMES.     [x] Dr. Nelida Juarez   [] Dr. Angel Melissa  [] Aretha Jeans [facility]  [] Dr. Corina Ponce

## 2022-04-20 NOTE — DISCHARGE INSTRUCTIONS
Discharge Instructions  [address] [place] [address], [address] [place] [address]  Telephone: [phone]    NAME:  Faustino Mars OF BIRTH:  1955  MEDICAL RECORD NUMBER:  [id number]  DATE: 04/20/2022      Return Appointment:  [] Dressing supply provider:   [x] Home Healthcare: [address]    [x] Return Appointment:  1 weeks  [] Nurse Visit:       [] Discharge from [facility]  [] Ordered tests:    [x] Referrals: patient was seeing Dr. Brennan Kawasaki   [] Rx:        Wound Cleansing:   Do not scrub or use excessive force. Cleanse wound prior to applying a clean dressing with:  [x] Normal Saline   [x] Mild Soap & Water    [] Keep Wound Dry in Shower  [] Other:      Topical Treatments:  Do not apply lotions, creams, or ointments to wound bed unless directed. [] Apply moisturizing lotion to skin surrounding the wound prior to dressing change.  [] Apply antifungal ointment to skin surrounding the wound prior to dressing change.  [] Apply thin film of moisture barrier ointment to skin immediately around wound. [] Other:  Betadine to israel-wound     Dressings:           Wound Location Left leg and right heel  [x] Apply Primary Dressing:       [] MediHoney Gel    [] MediHoney Alginate               [] Calcium Alginate      [x] Calcium Alginate with Silver   [] Collagen                   [] Collagen with Silver                [] Santyl with Moistened saline gauze              [] Hydrofera Blue (cut to size and moistened)  [] Hydrofera Blue Ready (Cut to size)   [] NS wet to dry    [] Betadine wet to dry              [] Hydrogel                [] Mepitel     [] Bactroban/Mupirocin               [] Other:      [x] Cover and Secure with:     [x] Gauze [] Rosalio Estimable [] Kerlix   [] Foam [] Super Absorbant [x] ABD     [] ACE Wrap            [] Other:    Avoid contact of tape with skin.     [x] Change dressing: [] Daily    [] Every Other Day [] Once per week   [] Twice per week   [x] Three times per week [] Do Not Change Dressing   [] Other:     Compression:  Apply: [x] Multilayer Compression Wrap: [x]RightLeg [x]Left Leg                                 []Profore  [x]coflex lite             []Unna     [] Do not get leg(s) with wrap wet. If wraps become too tight call the center or completely remove the wrap. [x] Elevate leg(s) above the level of the heart when sitting. [x] Avoid prolonged standing in one place. Dietary:  [x] Diet as tolerated: [] Calorie Diabetic Diet: [x] No Added Salt:  [] Increase Protein: [] Other:     Activity:  [x] Activity as tolerated:    [] Patient has no activity restrictions       [] Strict Bedrest:   [] Remain off Work:       [] May return to full duty work:                                     [] Return to work with restrictions:               [address] [facility] Information: Should you experience any significant changes in your wound(s) or have questions about your wound care, please contact Thais [address] at [place] [phone] 8:00 am - 4:30. If you need help with your wound outside of these hours and cannot wait until we are again available, contact your PCP or go to the hospital emergency room. PLEASE NOTE: IF YOU ARE UNABLE TO OBTAIN WOUND SUPPLIES, CONTINUE TO USE THE SUPPLIES YOU HAVE AVAILABLE UNTIL YOU ARE ABLE TO REACH US. IT IS MOST IMPORTANT TO KEEP THE WOUND COVERED AT ALL TIMES.     [x] Dr. Hollie Vargas   [] Dr. Sandra Simon  [] Antonio Robertson [facility]  [] Dr. Gilma Peraza

## 2022-04-20 NOTE — WOUND CARE
[address]   Progress Note and Procedure Note     [address] RECORD NUMBER:  [id number]  AGE: 78 y.o. RACE WHITE/NON-  GENDER: male  : 1955  EPISODE DATE:  2022    Subjective:   No new problems    Chief Complaint   Patient presents with    Wound Check     right heel and left lateral leg          HISTORY of PRESENT ILLNESS JAZZY Dorantes is a 78 y.o. male who presents today for wound/ulcer evaluation.    History of Wound Context: BLE  Wound/Ulcer Pain Timing/Severity: mild  Quality of pain: dull  Severity:  1 / 10   Modifying Factors: None  Associated Signs/Symptoms: edema    Ulcer Identification:  Ulcer Type: venous    Contributing Factors: lymphedema    Wound: BLE         PAST MEDICAL HISTORY    Past Medical History:   Diagnosis Date    Arthritis     CAD (coronary artery disease)     Chronic obstructive pulmonary disease (HCC)     Diabetes ([facility] [address]     Gout     Hypertension     Ill-defined condition     Sleep apnea         PAST SURGICAL HISTORY    Past Surgical History:   Procedure Laterality Date    HX ORTHOPAEDIC      HX PACEMAKER      HX VEIN ABLATION ADHESIVE         FAMILY HISTORY    Family History   Problem Relation Age of Onset    Heart Disease Mother     Kidney Disease Mother     Hypertension Mother     Diabetes Mother     Heart Disease Father     Hypertension Father     Diabetes Sister     Diabetes Brother        SOCIAL HISTORY    Social History     Tobacco Use    Smoking status: Never Smoker    Smokeless tobacco: Never Used   Vaping Use    Vaping Use: Never used   Substance Use Topics    Alcohol use: Not Currently    Drug use: Never       ALLERGIES    Allergies   Allergen Reactions    Elavil Angioedema    Naproxen Unknown (comments)     Pt not sure of reaction    Sulfa (Sulfonamide Antibiotics) Nausea and Vomiting       MEDICATIONS    Current Outpatient Medications on File Prior to Encounter   Medication Sig Dispense Refill    insulin glargine,hum.rec.anlog (TOUJEO MAX U-300 SOLOSTAR SC) 80 Units by SubCUTAneous route two (2) times a day. 80 units in am, 34 units in pm      b complex vitamins tablet Take 1 Tablet by mouth daily.  Entresto 24-26 mg tablet Take 2 Tablets by mouth.  cloNIDine HCL (CATAPRES) 0.1 mg tablet Take 1 Tablet by mouth every twelve (12) hours.  ammonium lactate (AMLACTIN) 12 % topical cream Apply  to affected area as needed for Other (xerosis). rub in to affected area well  Indications: dry skin 280 g 0    vitamin E, dl,tocopheryl acet, (vitamin E acetate) 400 unit capsule Take 2 Capsules by mouth two (2) times a day. 100 Capsule 0    ascorbic acid, vitamin C, (Vitamin C) 500 mg tablet Take 1,000 mg by mouth two (2) times a day. Indications: inadequate vitamin C      tamsulosin (Flomax) 0.4 mg capsule Take 0.4 mg by mouth daily.  tiotropium (Spiriva with HandiHaler) 18 mcg inhalation capsule Take 1 Capsule by inhalation daily.  bumetanide (BUMEX) 2 mg tablet Take 2 mg by mouth two (2) times a day.  ferrous sulfate (Iron) 325 mg (65 mg iron) tablet Take  by mouth Daily (before breakfast).  aspirin 81 mg chewable tablet Take 81 mg by mouth daily.  flunisolide (NASAREL) 25 mcg (0.025 %) spry 2 Sprays two (2) times a day.  albuterol (ProAir HFA) 90 mcg/actuation inhaler Take  by inhalation.  colchicine 0.6 mg tablet Take 0.6 mg by mouth daily.  gabapentin (NEURONTIN) 300 mg capsule Take 300 mg by mouth four (4) times daily.  oxyCODONE IR (ROXICODONE) 5 mg immediate release tablet Take 5 mg by mouth every twelve (12) hours.  metFORMIN (GLUCOPHAGE) 500 mg tablet Take 1,000 mg by mouth two (2) times daily (with meals).  insulin lispro (HumaLOG U-100 Insulin) 100 unit/mL injection by SubCUTAneous route. Sliding scale       No current facility-administered medications on file prior to encounter.        REVIEW OF SYSTEMS    Pertinent items are noted in HPI. Objective:     Visit Vitals  /71 (BP 1 Location: Right arm, BP Patient Position: At rest;Sitting)   Pulse 91   Temp 97.1 °F (36.2 °C)   Resp 22       Wt Readings from Last 3 Encounters:   12/20/21 (!) 169 kg (372 lb 9.6 oz)   11/08/21 (!) 168.6 kg (371 lb 12.8 oz)   10/25/21 (!) 168.3 kg (371 lb)       PHYSICAL EXAM  The right medial ankle ulcer is smaller, but are clustered suggesting that the wound area is slightly increased, no evidence of active infection  The left lateral leg wounds measure slightly smaller, slough debrided, no evidence of active infection  Pertinent items are noted in HPI. Assessment:    slight improvement    Problem List Items Addressed This Visit     Idiopathic chronic venous hypertension of left lower extremity with ulcer (HCC)    Localized edema    Non-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 ([address]    Ulcer of left foot, with fat layer exposed ([address]    Ulcer of right heel, with fat layer exposed ([address]          Wound Leg lower Left;Lateral #1 (Active)   Wound Image   04/20/22 1306   Wound Etiology Venous 04/20/22 1306   Dressing Status Clean;Dry; Intact 04/20/22 1306   Cleansed Soap and water 04/20/22 1306   Wound Length (cm) 10.6 cm 04/20/22 1306   Wound Width (cm) 8.3 cm 04/20/22 1306   Wound Depth (cm) 0.2 cm 04/20/22 1306   Wound Surface Area (cm^2) 87.98 cm^2 04/20/22 1306   Change in Wound Size % 20.02 04/20/22 1306   Wound Volume (cm^3) 17. 596 cm^3 04/20/22 1306   Wound Healing % -60 04/20/22 1306   Post-Procedure Length (cm) 10.6 cm 04/20/22 1314   Post-Procedure Width (cm) 8.3 cm 04/20/22 1314   Post-Procedure Depth (cm) 0.2 cm 04/20/22 1314   Post-Procedure Surface Area (cm^2) 87.98 cm^2 04/20/22 1314   Post-Procedure Volume (cm^3) 17. 596 cm^3 04/20/22 1314   Wound Assessment Slough;Pink/red;Granulation tissue 04/20/22 1306   Drainage Amount Large 04/20/22 1306   Drainage Description Serosanguinous 04/20/22 1306   Wound Odor None 04/20/22 1306   Alissa-Wound/Incision Assessment Fragile; Maceration 04/20/22 1306   Edges Epibole (rolled edges) 04/20/22 1306   Wound Thickness Description Full thickness 04/20/22 1306   Number of days: 49       Wound Heel Right #2 (Active)   Wound Image   04/20/22 1302   Wound Etiology Venous 04/20/22 1302   Dressing Status Clean;Dry; Intact 04/20/22 1302   Cleansed Soap and water 04/20/22 1302   Wound Length (cm) 1.7 cm 04/20/22 1302   Wound Width (cm) 5.1 cm 04/20/22 1302   Wound Depth (cm) 0.1 cm 04/20/22 1302   Wound Surface Area (cm^2) 8.67 cm^2 04/20/22 1302   Change in Wound Size % -98.4 04/20/22 1302   Wound Volume (cm^3) 0.867 cm^3 04/20/22 1302   Wound Healing % -98 04/20/22 1302   Post-Procedure Length (cm) 0.7 cm 04/06/22 0906   Post-Procedure Width (cm) 4.2 cm 04/06/22 0906   Post-Procedure Depth (cm) 0.2 cm 04/06/22 0906   Post-Procedure Surface Area (cm^2) 2.94 cm^2 04/06/22 0906   Post-Procedure Volume (cm^3) 0.588 cm^3 04/06/22 0906   Wound Assessment Granulation tissue;Pink/red;Slough 04/20/22 1302   Drainage Amount Moderate 04/20/22 1302   Drainage Description Serosanguinous 04/20/22 1302   Wound Odor None 04/20/22 1302   Alissa-Wound/Incision Assessment Maceration 04/20/22 1302   Edges Attached edges 04/20/22 1302   Wound Thickness Description Full thickness 04/20/22 1302   Number of days: 28       POP IN PROCEDURE TYPE (DEBRIDEMENT, BIOPSY, I&D, SKIN SUB, CHEMICAL CAUERTY)     Plan:   Silvercel, drawtex, gauze  Coflex lite    Treatment Note please see attached Discharge Instructions    Written patient dismissal instructions given to patient or POA.          Electronically signed by Haylee Dexter MD on 4/20/2022 at 1:20 PM              Debridement Wound Care        Problem List Items Addressed This Visit     Idiopathic chronic venous hypertension of left lower extremity with ulcer ([facility] [address]    Localized edema    Non-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 ([address]    Ulcer of left foot, with fat layer exposed ([address]    Ulcer of right heel, with fat layer exposed ([address]          Procedure Note  Indications:  Based on my examination of this patient's wound(s)/ulcer(s) today, debridement is required to promote healing and evaluate the wound base. Performed by: Junaid Garcia MD    Consent obtained: Yes    Time out taken: Yes    Debridement: Excisional    Using curette the wound(s)/ulcer(s) was/were sharply debrided down through and including the removal of    subcutaneous tissue    Devitalized Tissue Debrided: slough    Pre Debridement Measurements:  Are located in the Wound/Ulcer Documentation Flow Sheet    Non-Pressure ulcer, fat layer exposed    Wound/Ulcer #: 1 and 2    Post Debridement Measurements:  Wound/Ulcer Descriptions are Pre Debridement except measurements:    Wound Leg lower Left;Lateral #1 (Active)   Wound Image   04/20/22 1306   Wound Etiology Venous 04/20/22 1306   Dressing Status Clean;Dry; Intact 04/20/22 1306   Cleansed Soap and water 04/20/22 1306   Wound Length (cm) 10.6 cm 04/20/22 1306   Wound Width (cm) 8.3 cm 04/20/22 1306   Wound Depth (cm) 0.2 cm 04/20/22 1306   Wound Surface Area (cm^2) 87.98 cm^2 04/20/22 1306   Change in Wound Size % 20.02 04/20/22 1306   Wound Volume (cm^3) 17. 596 cm^3 04/20/22 1306   Wound Healing % -60 04/20/22 1306   Post-Procedure Length (cm) 10.6 cm 04/20/22 1314   Post-Procedure Width (cm) 8.3 cm 04/20/22 1314   Post-Procedure Depth (cm) 0.2 cm 04/20/22 1314   Post-Procedure Surface Area (cm^2) 87.98 cm^2 04/20/22 1314   Post-Procedure Volume (cm^3) 17. 596 cm^3 04/20/22 1314   Wound Assessment Slough;Pink/red;Granulation tissue 04/20/22 1306   Drainage Amount Large 04/20/22 1306   Drainage Description Serosanguinous 04/20/22 1306   Wound Odor None 04/20/22 1306   Alissa-Wound/Incision Assessment Fragile; Maceration 04/20/22 1306   Edges Epibole (rolled edges) 04/20/22 1306   Wound Thickness Description Full thickness 04/20/22 1306   Number of days: 49       Wound Heel Right #2 (Active)   Wound Image   04/20/22 1302   Wound Etiology Venous 04/20/22 1302   Dressing Status Clean;Dry; Intact 04/20/22 1302   Cleansed Soap and water 04/20/22 1302   Wound Length (cm) 1.7 cm 04/20/22 1302   Wound Width (cm) 5.1 cm 04/20/22 1302   Wound Depth (cm) 0.1 cm 04/20/22 1302   Wound Surface Area (cm^2) 8.67 cm^2 04/20/22 1302   Change in Wound Size % -98.4 04/20/22 1302   Wound Volume (cm^3) 0.867 cm^3 04/20/22 1302   Wound Healing % -98 04/20/22 1302   Post-Procedure Length (cm) 0.7 cm 04/06/22 0906   Post-Procedure Width (cm) 4.2 cm 04/06/22 0906   Post-Procedure Depth (cm) 0.2 cm 04/06/22 0906   Post-Procedure Surface Area (cm^2) 2.94 cm^2 04/06/22 0906   Post-Procedure Volume (cm^3) 0.588 cm^3 04/06/22 0906   Wound Assessment Granulation tissue;Pink/red;Slough 04/20/22 1302   Drainage Amount Moderate 04/20/22 1302   Drainage Description Serosanguinous 04/20/22 1302   Wound Odor None 04/20/22 1302   Alissa-Wound/Incision Assessment Maceration 04/20/22 1302   Edges Attached edges 04/20/22 1302   Wound Thickness Description Full thickness 04/20/22 1302   Number of days: 28        Total Surface Area Debrided:  95 sq cm     Estimated Blood Loss:  Minimal     Hemostasis Achieved: Pressure    Procedural Pain: 2 / 10     Post Procedural Pain: 0 / 10     Response to treatment: Well tolerated by patient

## 2022-04-27 ENCOUNTER — HOSPITAL ENCOUNTER (OUTPATIENT)
Dept: WOUND CARE | Age: 67
Discharge: HOME | End: 2022-04-27
Payer: MEDICARE

## 2022-04-27 VITALS
RESPIRATION RATE: 20 BRPM | DIASTOLIC BLOOD PRESSURE: 79 MMHG | SYSTOLIC BLOOD PRESSURE: 130 MMHG | HEART RATE: 85 BPM | TEMPERATURE: 97.9 F

## 2022-04-27 DIAGNOSIS — L97.522 ULCER OF LEFT FOOT, WITH FAT LAYER EXPOSED (HCC): ICD-10-CM

## 2022-04-27 DIAGNOSIS — L97.929 IDIOPATHIC CHRONIC VENOUS HYPERTENSION OF LEFT LOWER EXTREMITY WITH ULCER (HCC): ICD-10-CM

## 2022-04-27 DIAGNOSIS — I87.312 IDIOPATHIC CHRONIC VENOUS HYPERTENSION OF LEFT LOWER EXTREMITY WITH ULCER (HCC): ICD-10-CM

## 2022-04-27 DIAGNOSIS — L97.412 ULCER OF RIGHT HEEL, WITH FAT LAYER EXPOSED (HCC): ICD-10-CM

## 2022-04-27 DIAGNOSIS — R60.0 LOCALIZED EDEMA: ICD-10-CM

## 2022-04-27 DIAGNOSIS — L97.822 NON-PRESSURE CHRONIC ULCER OF OTHER PART OF LEFT LOWER LEG WITH FAT LAYER EXPOSED (HCC): ICD-10-CM

## 2022-04-27 PROCEDURE — 29581 APPL MULTLAYER CMPRN SYS LEG: CPT

## 2022-04-27 PROCEDURE — 74011000250 HC RX REV CODE- 250: Performed by: SPECIALIST

## 2022-04-27 PROCEDURE — 11042 DBRDMT SUBQ TIS 1ST 20SQCM/<: CPT

## 2022-04-27 RX ORDER — LIDOCAINE HYDROCHLORIDE 20 MG/ML
15 SOLUTION OROPHARYNGEAL AS NEEDED
Status: DISCONTINUED | OUTPATIENT
Start: 2022-04-27 | End: 2022-04-29 | Stop reason: HOSPADM

## 2022-04-27 NOTE — DISCHARGE INSTRUCTIONS
Discharge Instructions  [address] [place] [address], [address] [place] [address]  Telephone: [phone]    NAME:  Malena Kaye OF BIRTH:  1955  MEDICAL RECORD NUMBER:  [id number]  DATE: 04/27/2022      Return Appointment:  [] Dressing supply provider:   [x] Home Healthcare: [address]    [x] Return Appointment:  1 weeks  [] Nurse Visit:       [] Discharge from [facility]  [] Ordered tests:    [x] Referrals: patient was seeing Dr. Simon Jefferson   [] Rx:        Wound Cleansing:   Do not scrub or use excessive force. Cleanse wound prior to applying a clean dressing with:  [x] Normal Saline   [x] Mild Soap & Water    [] Keep Wound Dry in Shower  [] Other:      Topical Treatments:  Do not apply lotions, creams, or ointments to wound bed unless directed. [] Apply moisturizing lotion to skin surrounding the wound prior to dressing change.  [] Apply antifungal ointment to skin surrounding the wound prior to dressing change.  [] Apply thin film of moisture barrier ointment to skin immediately around wound. [] Other:  Betadine to israel-wound     Dressings:           Wound Location Left leg and right heel  [x] Apply Primary Dressing:       [] MediHoney Gel    [] MediHoney Alginate               [] Calcium Alginate      [x] Calcium Alginate with Silver   [] Collagen                   [] Collagen with Silver                [] Santyl with Moistened saline gauze              [] Hydrofera Blue (cut to size and moistened)  [] Hydrofera Blue Ready (Cut to size)   [] NS wet to dry    [] Betadine wet to dry              [] Hydrogel                [] Mepitel     [] Bactroban/Mupirocin               [] Other:      [x] Cover and Secure with:     [x] Gauze [] Christia Olguin [] Kerlix   [] Foam [] Super Absorbant [x] ABD     [] ACE Wrap            [] Other:    Avoid contact of tape with skin.     [x] Change dressing: [] Daily    [] Every Other Day [] Once per week   [] Twice per week   [x] Three times per week [] Do Not Change Dressing   [] Other:     Compression:  Apply: [x] Multilayer Compression Wrap: [x]RightLeg [x]Left Leg                                 []Profore  [x]coflex lite             []Unna     [] Do not get leg(s) with wrap wet. If wraps become too tight call the center or completely remove the wrap. [x] Elevate leg(s) above the level of the heart when sitting. [x] Avoid prolonged standing in one place. Dietary:  [x] Diet as tolerated: [] Calorie Diabetic Diet: [x] No Added Salt:  [] Increase Protein: [] Other:     Activity:  [x] Activity as tolerated:    [] Patient has no activity restrictions       [] Strict Bedrest:   [] Remain off Work:       [] May return to full duty work:                                     [] Return to work with restrictions:               [address] Information: Should you experience any significant changes in your wound(s) or have questions about your wound care, please contact Th[address] at [email or web address] 8:00 am - 4:30. If you need help with your wound outside of these hours and cannot wait until we are again available, contact your PCP or go to the hospital emergency room. PLEASE NOTE: IF YOU ARE UNABLE TO OBTAIN WOUND SUPPLIES, CONTINUE TO USE THE SUPPLIES YOU HAVE AVAILABLE UNTIL YOU ARE ABLE TO REACH US. IT IS MOST IMPORTANT TO KEEP THE WOUND COVERED AT ALL TIMES.     [x] Dr. Ale Driscoll   [] Dr. Ritu Hadley  [] [facility]  [] Dr. Ines Clements

## 2022-04-27 NOTE — WOUND CARE
Discharge Instructions  [address] [place] [address], [address] [place] [address]  Telephone: [phone]    NAME:  Moon Isaac OF BIRTH:  1955  MEDICAL RECORD NUMBER:  [id number]  DATE: 04/27/2022      Return Appointment:  [] Dressing supply provider:   [x] [facility] [address]    [x] Return Appointment:  1 weeks  [] Nurse Visit:       [] Discharge from [facility]  [] Ordered tests:    [x] Referrals: patient was seeing Dr. Jaspal Moreno   [] Rx:        Wound Cleansing:   Do not scrub or use excessive force. Cleanse wound prior to applying a clean dressing with:  [x] Normal Saline   [x] Mild Soap & Water    [] Keep Wound Dry in Shower  [] Other:      Topical Treatments:  Do not apply lotions, creams, or ointments to wound bed unless directed. [] Apply moisturizing lotion to skin surrounding the wound prior to dressing change.  [] Apply antifungal ointment to skin surrounding the wound prior to dressing change.  [] Apply thin film of moisture barrier ointment to skin immediately around wound. [] Other:  Betadine to israel-wound     Dressings:           Wound Location Left leg and right heel  [x] Apply Primary Dressing:       [] MediHoney Gel    [] MediHoney Alginate               [] Calcium Alginate      [x] Calcium Alginate with Silver   [] Collagen                   [] Collagen with Silver                [] Santyl with Moistened saline gauze              [] Hydrofera Blue (cut to size and moistened)  [] Hydrofera Blue Ready (Cut to size)   [] NS wet to dry    [] Betadine wet to dry              [] Hydrogel                [] Mepitel     [] Bactroban/Mupirocin               [] Other:      [x] Cover and Secure with:     [x] Gauze [] Meaghan Kami [] Kerlix   [] Foam [] Super Absorbant [x] ABD     [] ACE Wrap            [] Other:    Avoid contact of tape with skin.     [x] Change dressing: [] Daily    [] Every Other Day [] Once per week   [] Twice per week   [x] Three times per week [] Do Not Change Dressing   [] Other:     Compression:  Apply: [x] Multilayer Compression Wrap: [x]RightLeg [x]Left Leg                                 []Profore  [x]coflex lite             []Unna     [] Do not get leg(s) with wrap wet. If wraps become too tight call the center or completely remove the wrap. [x] Elevate leg(s) above the level of the heart when sitting. [x] Avoid prolonged standing in one place. Dietary:  [x] Diet as tolerated: [] Calorie Diabetic Diet: [x] No Added Salt:  [] Increase Protein: [] Other:     Activity:  [x] Activity as tolerated:    [] Patient has no activity restrictions       [] Strict Bedrest:   [] Remain off Work:       [] May return to full duty work:                                     [] Return to work with restrictions:               [address] Information: Should you experience any significant changes in your wound(s) or have questions about your wound care, please contact Thais Doyle 26 at [place] [phone] 8:00 am - 4:30. If you need help with your wound outside of these hours and cannot wait until we are again available, contact your PCP or go to the hospital emergency room. PLEASE NOTE: IF YOU ARE UNABLE TO OBTAIN WOUND SUPPLIES, CONTINUE TO USE THE SUPPLIES YOU HAVE AVAILABLE UNTIL YOU ARE ABLE TO REACH US. IT IS MOST IMPORTANT TO KEEP THE WOUND COVERED AT ALL TIMES.     [x] Dr. Zi Tolbert   [] Dr. Eleanor Velasco  [] [facility]  [] Dr. Laura Slater

## 2022-04-27 NOTE — WOUND CARE
Multilayer Compression Wrap   (Not Unna) Below the Knee    NAME:  Elvi Exon OF BIRTH:  1955  MEDICAL RECORD NUMBER:  [id number]  DATE:  4/27/2022    Removed old Multilayer wrap if indicated and wash leg with mild soap/water. Applied moisturizing agent to dry skin as needed. Applied primary and secondary dressing as ordered. Applied multilayered dressing below the knee to right lower leg. Applied multilayered dressing below the knee to left lower leg. Instructed patient/caregiver not to remove dressing and to keep it clean and dry. Instructed patient/caregiver on complications to report to provider, such as pain, numbness in toes, heavy drainage, and slippage of dressing. Instructed patient on purpose of compression dressing and on activity and exercise recommendations.     Response to treatment: Well tolerated by patient       Electronically signed by Donald Haley LPN on 4/02/7367 at 8:13 PM

## 2022-04-27 NOTE — WOUND CARE
[address]   Progress Note and Procedure Note     [address] RECORD NUMBER:  [id number]  AGE: 78 y.o. RACE WHITE/NON-  GENDER: male  : 1955  EPISODE DATE:  2022    Subjective:   Patient reports drainage predominantly from the right ankle  No reported fever or pain  Chief Complaint   Patient presents with    Wound Check     right heel, left leg         HISTORY of PRESENT ILLNESS JAZZY Hernandez is a 78 y.o. male who presents today for wound/ulcer evaluation.    History of Wound Context: BLE  Wound/Ulcer Pain Timing/Severity: mild  Quality of pain: burning  Severity:  1 / 10   Modifying Factors: None  Associated Signs/Symptoms: edema    Ulcer Identification:  Ulcer Type: venous    Contributing Factors: lymphedema    Wound: BLE         PAST MEDICAL HISTORY    Past Medical History:   Diagnosis Date    Arthritis     CAD (coronary artery disease)     Chronic obstructive pulmonary disease (HCC)     Diabetes ([facility] [address]     Gout     Hypertension     Ill-defined condition     Sleep apnea         PAST SURGICAL HISTORY    Past Surgical History:   Procedure Laterality Date    HX ORTHOPAEDIC      HX PACEMAKER      HX VEIN ABLATION ADHESIVE         FAMILY HISTORY    Family History   Problem Relation Age of Onset    Heart Disease Mother     Kidney Disease Mother     Hypertension Mother     Diabetes Mother     Heart Disease Father     Hypertension Father     Diabetes Sister     Diabetes Brother        SOCIAL HISTORY    Social History     Tobacco Use    Smoking status: Never Smoker    Smokeless tobacco: Never Used   Vaping Use    Vaping Use: Never used   Substance Use Topics    Alcohol use: Not Currently    Drug use: Never       ALLERGIES    Allergies   Allergen Reactions    Elavil Angioedema    Naproxen Unknown (comments)     Pt not sure of reaction    Sulfa (Sulfonamide Antibiotics) Nausea and Vomiting       MEDICATIONS    Current Outpatient Medications on File Prior to Encounter   Medication Sig Dispense Refill    insulin glargine,hum.rec.anlog (TOUJEO MAX U-300 SOLOSTAR SC) 80 Units by SubCUTAneous route two (2) times a day. 80 units in am, 34 units in pm      b complex vitamins tablet Take 1 Tablet by mouth daily.  Entresto 24-26 mg tablet Take 2 Tablets by mouth.  cloNIDine HCL (CATAPRES) 0.1 mg tablet Take 1 Tablet by mouth every twelve (12) hours.  ammonium lactate (AMLACTIN) 12 % topical cream Apply  to affected area as needed for Other (xerosis). rub in to affected area well  Indications: dry skin 280 g 0    vitamin E, dl,tocopheryl acet, (vitamin E acetate) 400 unit capsule Take 2 Capsules by mouth two (2) times a day. 100 Capsule 0    ascorbic acid, vitamin C, (Vitamin C) 500 mg tablet Take 1,000 mg by mouth two (2) times a day. Indications: inadequate vitamin C      tamsulosin (Flomax) 0.4 mg capsule Take 0.4 mg by mouth daily.  tiotropium (Spiriva with HandiHaler) 18 mcg inhalation capsule Take 1 Capsule by inhalation daily.  bumetanide (BUMEX) 2 mg tablet Take 2 mg by mouth two (2) times a day.  ferrous sulfate (Iron) 325 mg (65 mg iron) tablet Take  by mouth Daily (before breakfast).  aspirin 81 mg chewable tablet Take 81 mg by mouth daily.  flunisolide (NASAREL) 25 mcg (0.025 %) spry 2 Sprays two (2) times a day.  albuterol (ProAir HFA) 90 mcg/actuation inhaler Take  by inhalation.  colchicine 0.6 mg tablet Take 0.6 mg by mouth daily.  gabapentin (NEURONTIN) 300 mg capsule Take 300 mg by mouth four (4) times daily.  oxyCODONE IR (ROXICODONE) 5 mg immediate release tablet Take 5 mg by mouth every twelve (12) hours.  metFORMIN (GLUCOPHAGE) 500 mg tablet Take 1,000 mg by mouth two (2) times daily (with meals).  insulin lispro (HumaLOG U-100 Insulin) 100 unit/mL injection by SubCUTAneous route.  Sliding scale       No current facility-administered medications on file prior to encounter. REVIEW OF SYSTEMS    Pertinent items are noted in HPI. Objective:     Visit Vitals  /79 (BP 1 Location: Right arm, BP Patient Position: At rest;Sitting)   Pulse 85   Temp 97.9 °F (36.6 °C)   Resp 20       Wt Readings from Last 3 Encounters:   12/20/21 (!) 169 kg (372 lb 9.6 oz)   11/08/21 (!) 168.6 kg (371 lb 12.8 oz)   10/25/21 (!) 168.3 kg (371 lb)       PHYSICAL EXAM    Pertinent items are noted in HPI. Assessment:      Problem List Items Addressed This Visit     Idiopathic chronic venous hypertension of left lower extremity with ulcer ([address]    Localized edema    Non-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 ([address]    Ulcer of left foot, with fat layer exposed ([address]    Ulcer of right heel, with fat layer exposed ([address]          Wound Leg lower Left;Lateral #1 (Active)   Wound Image   04/27/22 1416   Wound Etiology Venous 04/27/22 1416   Dressing Status Clean;Dry; Intact 04/27/22 1416   Cleansed Soap and water 04/27/22 1416   Wound Length (cm) 9 cm 04/27/22 1416   Wound Width (cm) 7.5 cm 04/27/22 1416   Wound Depth (cm) 0.1 cm 04/27/22 1416   Wound Surface Area (cm^2) 67.5 cm^2 04/27/22 1416   Change in Wound Size % 38.64 04/27/22 1416   Wound Volume (cm^3) 6.75 cm^3 04/27/22 1416   Wound Healing % 39 04/27/22 1416   Post-Procedure Length (cm) 10.6 cm 04/20/22 1314   Post-Procedure Width (cm) 8.3 cm 04/20/22 1314   Post-Procedure Depth (cm) 0.2 cm 04/20/22 1314   Post-Procedure Surface Area (cm^2) 87.98 cm^2 04/20/22 1314   Post-Procedure Volume (cm^3) 17. 596 cm^3 04/20/22 1314   Wound Assessment Slough;Pink/red;Granulation tissue 04/27/22 1416   Drainage Amount Large 04/27/22 1416   Drainage Description Serosanguinous 04/27/22 1416   Wound Odor None 04/27/22 1416   Alissa-Wound/Incision Assessment Fragile; Maceration 04/27/22 1416   Edges Epibole (rolled edges) 04/27/22 1416   Wound Thickness Description Full thickness 04/27/22 1416 Number of days: 56       Wound Heel Right #2 (Active)   Wound Image   04/27/22 1415   Wound Etiology Venous 04/27/22 1415   Dressing Status Clean;Dry; Intact 04/27/22 1415   Cleansed Soap and water 04/27/22 1415   Wound Length (cm) 0.4 cm 04/27/22 1415   Wound Width (cm) 0.2 cm 04/27/22 1415   Wound Depth (cm) 0.1 cm 04/27/22 1415   Wound Surface Area (cm^2) 0.08 cm^2 04/27/22 1415   Change in Wound Size % 98.17 04/27/22 1415   Wound Volume (cm^3) 0.008 cm^3 04/27/22 1415   Wound Healing % 98 04/27/22 1415   Post-Procedure Length (cm) 0.7 cm 04/06/22 0906   Post-Procedure Width (cm) 4.2 cm 04/06/22 0906   Post-Procedure Depth (cm) 0.2 cm 04/06/22 0906   Post-Procedure Surface Area (cm^2) 2.94 cm^2 04/06/22 0906   Post-Procedure Volume (cm^3) 0.588 cm^3 04/06/22 0906   Wound Assessment Granulation tissue;Pink/red;Slough 04/27/22 1415   Drainage Amount Moderate 04/27/22 1415   Drainage Description Serosanguinous 04/27/22 1415   Wound Odor None 04/27/22 1415   Alissa-Wound/Incision Assessment Intact 04/27/22 1415   Edges Attached edges 04/27/22 1415   Wound Thickness Description Full thickness 04/27/22 1415   Number of days: 35       POP IN PROCEDURE TYPE (DEBRIDEMENT, BIOPSY, I&D, SKIN SUB, CHEMICAL CAUERTY)     Plan:     Treatment Note please see attached Discharge Instructions    Written patient dismissal instructions given to patient or POA.          Electronically signed by Mandi Higuera MD on 4/27/2022 at 2:28 PM              Debridement Wound Care        Problem List Items Addressed This Visit     Idiopathic chronic venous hypertension of left lower extremity with ulcer ([address]    Localized edema    Non-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 ([address]    Ulcer of left foot, with fat layer exposed ([address]    Ulcer of right heel, with fat layer exposed ([address]          Procedure Note  Indications:  Based on my examination of this patient's wound(s)/ulcer(s) today, debridement is required to promote healing and evaluate the wound base. Performed by: Oscar Benitez MD    Consent obtained: Yes    Time out taken: Yes    Debridement: Excisional    Using curette the wound(s)/ulcer(s) was/were sharply debrided down through and including the removal of    subcutaneous tissue    Devitalized Tissue Debrided: slough    Pre Debridement Measurements:  Are located in the Wound/Ulcer Documentation Flow Sheet    Non-Pressure ulcer, fat layer exposed    Wound/Ulcer #: 1 and 2    Post Debridement Measurements:  Wound/Ulcer Descriptions are Pre Debridement except measurements:    Wound Leg lower Left;Lateral #1 (Active)   Wound Image   04/27/22 1416   Wound Etiology Venous 04/27/22 1416   Dressing Status Clean;Dry; Intact 04/27/22 1416   Cleansed Soap and water 04/27/22 1416   Wound Length (cm) 9 cm 04/27/22 1416   Wound Width (cm) 7.5 cm 04/27/22 1416   Wound Depth (cm) 0.1 cm 04/27/22 1416   Wound Surface Area (cm^2) 67.5 cm^2 04/27/22 1416   Change in Wound Size % 38.64 04/27/22 1416   Wound Volume (cm^3) 6.75 cm^3 04/27/22 1416   Wound Healing % 39 04/27/22 1416   Post-Procedure Length (cm) 10.6 cm 04/20/22 1314   Post-Procedure Width (cm) 8.3 cm 04/20/22 1314   Post-Procedure Depth (cm) 0.2 cm 04/20/22 1314   Post-Procedure Surface Area (cm^2) 87.98 cm^2 04/20/22 1314   Post-Procedure Volume (cm^3) 17. 596 cm^3 04/20/22 1314   Wound Assessment Slough;Pink/red;Granulation tissue 04/27/22 1416   Drainage Amount Large 04/27/22 1416   Drainage Description Serosanguinous 04/27/22 1416   Wound Odor None 04/27/22 1416   Alissa-Wound/Incision Assessment Fragile; Maceration 04/27/22 1416   Edges Epibole (rolled edges) 04/27/22 1416   Wound Thickness Description Full thickness 04/27/22 1416   Number of days: 56       Wound Heel Right #2 (Active)   Wound Image   04/27/22 1415   Wound Etiology Venous 04/27/22 1415   Dressing Status Clean;Dry; Intact 04/27/22 1415   Cleansed Soap and water 04/27/22 1415   Wound Length (cm) 0.4 cm 04/27/22 1415   Wound Width (cm) 0.2 cm 04/27/22 1415   Wound Depth (cm) 0.1 cm 04/27/22 1415   Wound Surface Area (cm^2) 0.08 cm^2 04/27/22 1415   Change in Wound Size % 98.17 04/27/22 1415   Wound Volume (cm^3) 0.008 cm^3 04/27/22 1415   Wound Healing % 98 04/27/22 1415   Post-Procedure Length (cm) 0.7 cm 04/06/22 0906   Post-Procedure Width (cm) 4.2 cm 04/06/22 0906   Post-Procedure Depth (cm) 0.2 cm 04/06/22 0906   Post-Procedure Surface Area (cm^2) 2.94 cm^2 04/06/22 0906   Post-Procedure Volume (cm^3) 0.588 cm^3 04/06/22 0906   Wound Assessment Granulation tissue;Pink/red;Slough 04/27/22 1415   Drainage Amount Moderate 04/27/22 1415   Drainage Description Serosanguinous 04/27/22 1415   Wound Odor None 04/27/22 1415   Alissa-Wound/Incision Assessment Intact 04/27/22 1415   Edges Attached edges 04/27/22 1415   Wound Thickness Description Full thickness 04/27/22 1415   Number of days: 35        Total Surface Area Debrided:  20 sq cm     Estimated Blood Loss:  Minimal     Hemostasis Achieved: Pressure    Procedural Pain: 2 / 10     Post Procedural Pain: 0 / 10     Response to treatment: Well tolerated by patient

## 2022-05-04 ENCOUNTER — HOSPITAL ENCOUNTER (OUTPATIENT)
Dept: WOUND CARE | Age: 67
Discharge: HOME | End: 2022-05-04
Payer: MEDICARE

## 2022-05-04 VITALS
TEMPERATURE: 69.4 F | HEART RATE: 98 BPM | RESPIRATION RATE: 20 BRPM | DIASTOLIC BLOOD PRESSURE: 66 MMHG | SYSTOLIC BLOOD PRESSURE: 109 MMHG

## 2022-05-04 PROCEDURE — 29580 STRAPPING UNNA BOOT: CPT

## 2022-05-04 PROCEDURE — 74011000250 HC RX REV CODE- 250: Performed by: SPECIALIST

## 2022-05-04 RX ORDER — LIDOCAINE HYDROCHLORIDE 20 MG/ML
15 SOLUTION OROPHARYNGEAL AS NEEDED
Status: DISCONTINUED | OUTPATIENT
Start: 2022-05-04 | End: 2022-05-06 | Stop reason: HOSPADM

## 2022-05-04 NOTE — WOUND CARE
Multilayer Compression Wrap   (Not Unna) Below the Knee    NAME:  Page Hancock OF BIRTH:  1955  MEDICAL RECORD NUMBER:  [id number]  DATE:  5/4/2022    Removed old Multilayer wrap if indicated and wash leg with mild soap/water. Applied moisturizing agent to dry skin as needed. Applied primary and secondary dressing as ordered. Applied multilayered dressing below the knee to right lower leg. Applied multilayered dressing below the knee to left lower leg. Instructed patient/caregiver not to remove dressing and to keep it clean and dry. Instructed patient/caregiver on complications to report to provider, such as pain, numbness in toes, heavy drainage, and slippage of dressing. Instructed patient on purpose of compression dressing and on activity and exercise recommendations.     Response to treatment: Well tolerated by patient       Electronically signed by Shaheen King LPN on 7/3/0551 at 3:79 PM

## 2022-05-04 NOTE — WOUND CARE
[address]   Progress Note and Procedure Note   NO Procedure      [address] RECORD NUMBER:  [id number]  AGE: 78 y.o. RACE WHITE/NON-  GENDER: male  : 1955  EPISODE DATE:  2022    Subjective:     Chief Complaint   Patient presents with    Wound Check     R heel, L leg         HISTORY of PRESENT ILLNESS HPI    Aleshia Velazco is a 78 y.o. male who presents today for wound/ulcer evaluation.    History of Wound Context: R heel, L leg  Wound/Ulcer Pain Timing/Severity: mild  Quality of pain: aching  Severity:  1 / 10   Modifying Factors: None  Associated Signs/Symptoms: edema    Ulcer Identification:  Ulcer Type: venous    Contributing Factors: lymphedema    Wound: R heel, L leg         PAST MEDICAL HISTORY    Past Medical History:   Diagnosis Date    Arthritis     CAD (coronary artery disease)     Chronic obstructive pulmonary disease (HCC)     Diabetes ([facility] [address]     Gout     Hypertension     Ill-defined condition     Sleep apnea         PAST SURGICAL HISTORY    Past Surgical History:   Procedure Laterality Date    HX ORTHOPAEDIC      HX PACEMAKER      HX VEIN ABLATION ADHESIVE         FAMILY HISTORY    Family History   Problem Relation Age of Onset    Heart Disease Mother     Kidney Disease Mother     Hypertension Mother     Diabetes Mother     Heart Disease Father     Hypertension Father     Diabetes Sister     Diabetes Brother        SOCIAL HISTORY    Social History     Tobacco Use    Smoking status: Never Smoker    Smokeless tobacco: Never Used   Vaping Use    Vaping Use: Never used   Substance Use Topics    Alcohol use: Not Currently    Drug use: Never       ALLERGIES    Allergies   Allergen Reactions    Elavil Angioedema    Naproxen Unknown (comments)     Pt not sure of reaction    Sulfa (Sulfonamide Antibiotics) Nausea and Vomiting       MEDICATIONS    Current Outpatient Medications on File Prior to Encounter   Medication Sig Dispense Refill    insulin glargine,hum.rec.anlog (TOUJEO MAX U-300 SOLOSTAR SC) 80 Units by SubCUTAneous route two (2) times a day. 80 units in am, 34 units in pm      b complex vitamins tablet Take 1 Tablet by mouth daily.  Entresto 24-26 mg tablet Take 2 Tablets by mouth.  cloNIDine HCL (CATAPRES) 0.1 mg tablet Take 1 Tablet by mouth every twelve (12) hours.  ammonium lactate (AMLACTIN) 12 % topical cream Apply  to affected area as needed for Other (xerosis). rub in to affected area well  Indications: dry skin 280 g 0    vitamin E, dl,tocopheryl acet, (vitamin E acetate) 400 unit capsule Take 2 Capsules by mouth two (2) times a day. 100 Capsule 0    ascorbic acid, vitamin C, (Vitamin C) 500 mg tablet Take 1,000 mg by mouth two (2) times a day. Indications: inadequate vitamin C      tamsulosin (Flomax) 0.4 mg capsule Take 0.4 mg by mouth daily.  tiotropium (Spiriva with HandiHaler) 18 mcg inhalation capsule Take 1 Capsule by inhalation daily.  bumetanide (BUMEX) 2 mg tablet Take 2 mg by mouth two (2) times a day.  ferrous sulfate (Iron) 325 mg (65 mg iron) tablet Take  by mouth Daily (before breakfast).  aspirin 81 mg chewable tablet Take 81 mg by mouth daily.  flunisolide (NASAREL) 25 mcg (0.025 %) spry 2 Sprays two (2) times a day.  albuterol (ProAir HFA) 90 mcg/actuation inhaler Take  by inhalation.  colchicine 0.6 mg tablet Take 0.6 mg by mouth daily.  gabapentin (NEURONTIN) 300 mg capsule Take 300 mg by mouth four (4) times daily.  oxyCODONE IR (ROXICODONE) 5 mg immediate release tablet Take 5 mg by mouth every twelve (12) hours.  metFORMIN (GLUCOPHAGE) 500 mg tablet Take 1,000 mg by mouth two (2) times daily (with meals).  insulin lispro (HumaLOG U-100 Insulin) 100 unit/mL injection by SubCUTAneous route. Sliding scale       No current facility-administered medications on file prior to encounter.        REVIEW OF SYSTEMS    Pertinent items are noted in HPI. Objective:     Visit Vitals  /66 (BP 1 Location: Right upper arm, BP Patient Position: At rest;Sitting)   Pulse 98   Temp (!) 69.4 °F (20.8 °C)   Resp 20       Wt Readings from Last 3 Encounters:   12/20/21 (!) 169 kg (372 lb 9.6 oz)   11/08/21 (!) 168.6 kg (371 lb 12.8 oz)   10/25/21 (!) 168.3 kg (371 lb)       PHYSICAL EXAM  Of the right medial ankle, there are 2 small wounds that are clustered, minimal drainage, no evidence of active infection  On the left calf, there are 4 wounds of varying sizes, healthy granulation base, but increased in area, no obvious evidence of active infection    Pertinent items are noted in HPI. Assessment:    no improvement    Problem List Items Addressed This Visit     None          Procedure Note  Indications:  Based on my examination of this patient's wound(s)/ulcer(s) today, debridement is not required to promote healing and evaluate the wound base. Wound Leg lower Left;Lateral #1 (Active)   Wound Image   05/04/22 1407   Wound Etiology Venous 05/04/22 1407   Dressing Status Clean;Dry; Intact 05/04/22 1407   Cleansed Soap and water 05/04/22 1407   Wound Length (cm) 10.5 cm 05/04/22 1407   Wound Width (cm) 7.8 cm 05/04/22 1407   Wound Depth (cm) 0.3 cm 05/04/22 1407   Wound Surface Area (cm^2) 81.9 cm^2 05/04/22 1407   Change in Wound Size % 25.55 05/04/22 1407   Wound Volume (cm^3) 24.57 cm^3 05/04/22 1407   Wound Healing % -123 05/04/22 1407   Post-Procedure Length (cm) 9 cm 04/27/22 1429   Post-Procedure Width (cm) 7.5 cm 04/27/22 1429   Post-Procedure Depth (cm) 0.1 cm 04/27/22 1429   Post-Procedure Surface Area (cm^2) 67.5 cm^2 04/27/22 1429   Post-Procedure Volume (cm^3) 6.75 cm^3 04/27/22 1429   Wound Assessment Slough;Pink/red;Granulation tissue 05/04/22 1407   Drainage Amount Large 05/04/22 1407   Drainage Description Serosanguinous 05/04/22 1407   Wound Odor None 05/04/22 1407   Alissa-Wound/Incision Assessment Fragile; Maceration 05/04/22 1407   Edges Epibole (rolled edges) 05/04/22 1407   Wound Thickness Description Full thickness 05/04/22 1407   Number of days: 63       Wound Heel Right #2 (Active)   Wound Image   05/04/22 1407   Wound Etiology Venous 05/04/22 1407   Dressing Status Clean;Dry; Intact 05/04/22 1407   Cleansed Cleansed with saline 05/04/22 1407   Wound Length (cm) 0.6 cm 05/04/22 1407   Wound Width (cm) 0.4 cm 05/04/22 1407   Wound Depth (cm) 0.1 cm 05/04/22 1407   Wound Surface Area (cm^2) 0.24 cm^2 05/04/22 1407   Change in Wound Size % 94.51 05/04/22 1407   Wound Volume (cm^3) 0.024 cm^3 05/04/22 1407   Wound Healing % 95 05/04/22 1407   Post-Procedure Length (cm) 0.7 cm 04/06/22 0906   Post-Procedure Width (cm) 4.2 cm 04/06/22 0906   Post-Procedure Depth (cm) 0.2 cm 04/06/22 0906   Post-Procedure Surface Area (cm^2) 2.94 cm^2 04/06/22 0906   Post-Procedure Volume (cm^3) 0.588 cm^3 04/06/22 0906   Wound Assessment Granulation tissue;Pink/red;Slough 05/04/22 1407   Drainage Amount Moderate 05/04/22 1407   Drainage Description Serosanguinous 05/04/22 1407   Wound Odor None 05/04/22 1407   Alissa-Wound/Incision Assessment Intact 05/04/22 1407   Edges Attached edges 05/04/22 1407   Wound Thickness Description Full thickness 05/04/22 1407   Number of days: 42        Plan:   Aquacel, Coflex lite  Leg elevation  Lymphedema pump if okayed by Cardiologist (h/o CHF)    Treatment Note please see attached Discharge Instructions    Written patient dismissal instructions given to patient or POA.          Electronically signed by Krystal Figueroa MD on 5/4/2022 at 2:52 PM

## 2022-05-04 NOTE — WOUND CARE
Discharge Instructions  [address] [place] [address], [address] [place] [address]  Telephone: [phone]    NAME:  Bettye Isidro OF BIRTH:  1955  MEDICAL RECORD NUMBER:  [id number]  DATE: 05/04/2022      Return Appointment:  [] Dressing supply provider:   [x] [facility]: [facility] [address]    [x] Return Appointment:  1 weeks  [] Nurse Visit:       [] [facility]charge from [facility]  [] Ordered tests:    [x] Referrals: patient was seeing Dr. Loli Parmar   [] Rx:        Wound Cleansing:   Do not scrub or use excessive force. Cleanse wound prior to applying a clean dressing with:  [x] Normal Saline   [x] Mild Soap & Water    [] Keep Wound Dry in Shower  [] Other:      Topical Treatments:  Do not apply lotions, creams, or ointments to wound bed unless directed. [] Apply moisturizing lotion to skin surrounding the wound prior to dressing change.  [] Apply antifungal ointment to skin surrounding the wound prior to dressing change.  [] Apply thin film of moisture barrier ointment to skin immediately around wound. [] Other:  Betadine to israel-wound     Dressings:           Wound Location Left leg and right heel  [x] Apply Primary Dressing:       [] MediHoney Gel    [] MediHoney Alginate               [] Calcium Alginate      [] Calcium Alginate    [] Collagen                   [] Collagen with Silver                [] Santyl with Moistened saline gauze              [] Hydrofera Blue (cut to size and moistened)  [] Hydrofera Blue Ready (Cut to size)   [] NS wet to dry    [] Betadine wet to dry              [] Hydrogel                [] Mepitel     [] Bactroban/Mupirocin               [x] Other: aquacel-ag     [x] Cover and Secure with:     [x] Gauze [] Giron Clock [] Kerlix   [] Foam [] Super Absorbant [x] ABD     [] ACE Wrap            [] Other:    Avoid contact of tape with skin.     [x] Change dressing: [] Daily    [] Every Other Day [] Once per week   [] Twice per week   [x] Three times per week [] Do Not Change Dressing   [] Other:      Compression:  Apply: [x] Multilayer Compression Wrap: [x]RightLeg [x]Left Leg                                 []Profore  [x]coflex lite             []Unna     [] Do not get leg(s) with wrap wet. If wraps become too tight call the center or completely remove the wrap. [x] Elevate leg(s) above the level of the heart when sitting. [x] Avoid prolonged standing in one place. Dietary:  [x] Diet as tolerated: [] Calorie Diabetic Diet: [x] No Added Salt:  [] Increase Protein: [] Other:     Activity:  [x] Activity as tolerated:    [] Patient has no activity restrictions       [] Strict Bedrest:   [] Remain off Work:       [] May return to full duty work:                                     [] Return to work with restrictions:               [address] Information: Should you experience any significant changes in your wound(s) or have questions about your wound care, please contact Thais Doyle [address] at [place] [address] 8:00 am - 4:30. If you need help with your wound outside of these hours and cannot wait until we are again available, contact your PCP or go to the hospital emergency room. PLEASE NOTE: IF YOU ARE UNABLE TO OBTAIN WOUND SUPPLIES, CONTINUE TO USE THE SUPPLIES YOU HAVE AVAILABLE UNTIL YOU ARE ABLE TO REACH US. IT IS MOST IMPORTANT TO KEEP THE WOUND COVERED AT ALL TIMES.     [x] Dr. Roma Christian   [] Dr. Doris Shah  [] Slime [facility]  [] Dr. Magnolia Freire

## 2022-05-04 NOTE — DISCHARGE INSTRUCTIONS
Discharge Instructions  [address] [place] [address], [address] [place] [address]  Telephone: [phone]    NAME:  Heber Poole OF BIRTH:  1955  MEDICAL RECORD NUMBER:  [id number]  DATE: 05/04/2022      Return Appointment:  [] Dressing supply provider:   [x] Home Healthcare: [address]    [x] Return Appointment:  1 weeks  [] Nurse Visit:       [] Discharge from [facility]  [] Ordered tests:    [x] Referrals: patient was seeing Dr. Salome Wang   [] Rx:        Wound Cleansing:   Do not scrub or use excessive force. Cleanse wound prior to applying a clean dressing with:  [x] Normal Saline   [x] Mild Soap & Water    [] Keep Wound Dry in Shower  [] Other:      Topical Treatments:  Do not apply lotions, creams, or ointments to wound bed unless directed. [] Apply moisturizing lotion to skin surrounding the wound prior to dressing change.  [] Apply antifungal ointment to skin surrounding the wound prior to dressing change.  [] Apply thin film of moisture barrier ointment to skin immediately around wound. [] Other:  Betadine to israel-wound     Dressings:           Wound Location Left leg and right heel  [x] Apply Primary Dressing:       [] MediHoney Gel    [] MediHoney Alginate               [] Calcium Alginate      [] Calcium Alginate    [] Collagen                   [] Collagen with Silver                [] Santyl with Moistened saline gauze              [] Hydrofera Blue (cut to size and moistened)  [] Hydrofera Blue Ready (Cut to size)   [] NS wet to dry    [] Betadine wet to dry              [] Hydrogel                [] Mepitel     [] Bactroban/Mupirocin               [x] Other: aquacel-ag     [x] Cover and Secure with:     [x] Gauze [] Andrea Schwartz [] Kerlix   [] Foam [] Super Absorbant [x] ABD     [] ACE Wrap            [] Other:    Avoid contact of tape with skin.     [x] Change dressing: [] Daily    [] Every Other Day [] Once per week   [] Twice per week   [x] Three times per week [] Do Not Change Dressing   [] Other:      Compression:  Apply: [x] Multilayer Compression Wrap: [x]RightLeg [x]Left Leg                                 []Profore  [x]coflex lite             []Unna     [] Do not get leg(s) with wrap wet. If wraps become too tight call the center or completely remove the wrap. [x] Elevate leg(s) above the level of the heart when sitting. [x] Avoid prolonged standing in one place. Dietary:  [x] Diet as tolerated: [] Calorie Diabetic Diet: [x] No Added Salt:  [] Increase Protein: [] Other:     Activity:  [x] Activity as tolerated:    [] Patient has no activity restrictions       [] Strict Bedrest:   [] Remain off Work:       [] May return to full duty work:                                     [] Return to work with restrictions:               [address] Information: Should you experience any significant changes in your wound(s) or have questions about your wound care, please contact Thais Doyle [address] at [place] [address] 8:00 am - 4:30. If you need help with your wound outside of these hours and cannot wait until we are again available, contact your PCP or go to the hospital emergency room. PLEASE NOTE: IF YOU ARE UNABLE TO OBTAIN WOUND SUPPLIES, CONTINUE TO USE THE SUPPLIES YOU HAVE AVAILABLE UNTIL YOU ARE ABLE TO REACH US. IT IS MOST IMPORTANT TO KEEP THE WOUND COVERED AT ALL TIMES.     [x] Dr. Khris Hancock   [] Dr. Ezequiel Lew  [] Dyan Chow [facility]  [] Dr. Chris Paulson

## 2022-05-11 ENCOUNTER — HOSPITAL ENCOUNTER (OUTPATIENT)
Dept: WOUND CARE | Age: 67
Discharge: HOME | End: 2022-05-11
Payer: MEDICARE

## 2022-05-11 VITALS
SYSTOLIC BLOOD PRESSURE: 123 MMHG | HEART RATE: 44 BPM | TEMPERATURE: 98.3 F | RESPIRATION RATE: 20 BRPM | OXYGEN SATURATION: 98 % | DIASTOLIC BLOOD PRESSURE: 70 MMHG

## 2022-05-11 PROCEDURE — 11042 DBRDMT SUBQ TIS 1ST 20SQCM/<: CPT

## 2022-05-11 PROCEDURE — 11045 DBRDMT SUBQ TISS EACH ADDL: CPT

## 2022-05-11 PROCEDURE — 74011000250 HC RX REV CODE- 250: Performed by: SPECIALIST

## 2022-05-11 RX ORDER — LIDOCAINE HYDROCHLORIDE 20 MG/ML
15 SOLUTION OROPHARYNGEAL AS NEEDED
Status: DISCONTINUED | OUTPATIENT
Start: 2022-05-11 | End: 2022-05-13 | Stop reason: HOSPADM

## 2022-05-11 NOTE — WOUND CARE
Discharge Instructions  [address] [place] [address], [address] [place] [address]  Telephone: [phone]    NAME:  Valentino Bright OF BIRTH:  1955  MEDICAL RECORD NUMBER:  [id number]  DATE: 05/11/2022      Return Appointment:  [] Dressing supply provider:   [x] [facility] [facility]    [x] Return Appointment:  2    weeks  [] Nurse Visit:       [] Discharge from [facility]  [] Ordered tests:    [x] Referrals: patient was seeing Dr. Corrine Mohan   [] Rx:        Wound Cleansing:   Do not scrub or use excessive force. Cleanse wound prior to applying a clean dressing with:  [x] Normal Saline   [x] Mild Soap & Water    [] Keep Wound Dry in Shower  [] Other:      Topical Treatments:  Do not apply lotions, creams, or ointments to wound bed unless directed. [] Apply moisturizing lotion to skin surrounding the wound prior to dressing change.  [] Apply antifungal ointment to skin surrounding the wound prior to dressing change.  [] Apply thin film of moisture barrier ointment to skin immediately around wound. [] Other:  Betadine to israel-wound     Dressings:           Wound Location Left leg and right heel  [x] Apply Primary Dressing:       [] MediHoney Gel    [] MediHoney Alginate               [] Calcium Alginate      [] Calcium Alginate    [] Collagen                   [] Collagen with Silver                [] Santyl with Moistened saline gauze              [] Hydrofera Blue (cut to size and moistened)  [] Hydrofera Blue Ready (Cut to size)   [] NS wet to dry    [] Betadine wet to dry              [] Hydrogel                [] Mepitel     [] Bactroban/Mupirocin               [x] Other: aquacel-ag     [x] Cover and Secure with:     [x] Gauze [] Isrrael Fritz [] Kerlix   [] Foam [] Super Absorbant [x] ABD     [] ACE Wrap            [] Other:    Avoid contact of tape with skin.     [x] Change dressing: [] Daily    [] Every Other Day [] Once per week   [] Twice per week   [x] Three times per week [] Do Not Change Dressing   [] Other:      Compression:  Apply: [x] Multilayer Compression Wrap: [x]RightLeg [x]Left Leg                                 []Profore  [x]coflex lite             []Unna     [] Do not get leg(s) with wrap wet. If wraps become too tight call the center or completely remove the wrap. [x] Elevate leg(s) above the level of the heart when sitting. [x] Avoid prolonged standing in one place. Dietary:  [x] Diet as tolerated: [] Calorie Diabetic Diet: [x] No Added Salt:  [] Increase Protein: [] Other:     Activity:  [x] Activity as tolerated:    [] Patient has no activity restrictions       [] Strict Bedrest:   [] Remain off Work:       [] May return to full duty work:                                     [] Return to work with restrictions:               [address] Information: Should you experience any significant changes in your wound(s) or have questions about your wound care, please contact Thais Doyle [address] at [place] [address] 8:00 am - 4:30. If you need help with your wound outside of these hours and cannot wait until we are again available, contact your PCP or go to the hospital emergency room. PLEASE NOTE: IF YOU ARE UNABLE TO OBTAIN WOUND SUPPLIES, CONTINUE TO USE THE SUPPLIES YOU HAVE AVAILABLE UNTIL YOU ARE ABLE TO REACH US. IT IS MOST IMPORTANT TO KEEP THE WOUND COVERED AT ALL TIMES.     [x] Dr. Sonya Bonilla   [] Dr. Kristy Murray  [] Valentino Popp [facility]  [] Dr. Betina Florence

## 2022-05-11 NOTE — WOUND CARE
Multilayer Compression Wrap   (Not Unna) Below the Knee    NAME:  Elvi Exon OF BIRTH:  1955  MEDICAL RECORD NUMBER:  [id number]  DATE:  5/11/2022    Removed old Multilayer wrap if indicated and wash leg with mild soap/water. Applied moisturizing agent to dry skin as needed. Applied primary and secondary dressing as ordered. Applied multilayered dressing below the knee to right lower leg. Applied multilayered dressing below the knee to left lower leg. Instructed patient/caregiver not to remove dressing and to keep it clean and dry. Instructed patient/caregiver on complications to report to provider, such as pain, numbness in toes, heavy drainage, and slippage of dressing. Instructed patient on purpose of compression dressing and on activity and exercise recommendations.     Response to treatment: Well tolerated by patient       Electronically signed by Donald Haley LPN on 3/33/7494 at 5:22 PM

## 2022-05-11 NOTE — WOUND CARE
[address]   Progress Note and Procedure Note     [address] RECORD NUMBER:  [id number]  AGE: 78 y.o. RACE WHITE/NON-  GENDER: male  : 1955  EPISODE DATE:  2022    Subjective:   No new problems reported    Chief Complaint   Patient presents with    Wound Check     BLE         HISTORY of PRESENT ILLNESS HPI    Timoteo Carver is a 78 y.o. male who presents today for wound/ulcer evaluation.    History of Wound Context: BLE  Wound/Ulcer Pain Timing/Severity: mild  Quality of pain: aching and burning  Severity:  1 / 10   Modifying Factors: None  Associated Signs/Symptoms: edema    Ulcer Identification:  Ulcer Type: venous    Contributing Factors: lymphedema    Wound: BLE         PAST MEDICAL HISTORY    Past Medical History:   Diagnosis Date    Arthritis     CAD (coronary artery disease)     Chronic obstructive pulmonary disease (HCC)     Diabetes ([facility] [address]     Gout     Hypertension     Ill-defined condition     Sleep apnea         PAST SURGICAL HISTORY    Past Surgical History:   Procedure Laterality Date    HX ORTHOPAEDIC      HX PACEMAKER      HX VEIN ABLATION ADHESIVE         FAMILY HISTORY    Family History   Problem Relation Age of Onset    Heart Disease Mother     Kidney Disease Mother     Hypertension Mother     Diabetes Mother     Heart Disease Father     Hypertension Father     Diabetes Sister     Diabetes Brother        SOCIAL HISTORY    Social History     Tobacco Use    Smoking status: Never Smoker    Smokeless tobacco: Never Used   Vaping Use    Vaping Use: Never used   Substance Use Topics    Alcohol use: Not Currently    Drug use: Never       ALLERGIES    Allergies   Allergen Reactions    Elavil Angioedema    Naproxen Unknown (comments)     Pt not sure of reaction    Sulfa (Sulfonamide Antibiotics) Nausea and Vomiting       MEDICATIONS    Current Outpatient Medications on File Prior to Encounter   Medication Sig Dispense Refill    insulin glargine,hum.rec.anlog (TOUJEO MAX U-300 SOLOSTAR SC) 80 Units by SubCUTAneous route two (2) times a day. 80 units in am, 34 units in pm      b complex vitamins tablet Take 1 Tablet by mouth daily.  Entresto 24-26 mg tablet Take 2 Tablets by mouth.  cloNIDine HCL (CATAPRES) 0.1 mg tablet Take 1 Tablet by mouth every twelve (12) hours.  ammonium lactate (AMLACTIN) 12 % topical cream Apply  to affected area as needed for Other (xerosis). rub in to affected area well  Indications: dry skin 280 g 0    vitamin E, dl,tocopheryl acet, (vitamin E acetate) 400 unit capsule Take 2 Capsules by mouth two (2) times a day. 100 Capsule 0    ascorbic acid, vitamin C, (Vitamin C) 500 mg tablet Take 1,000 mg by mouth two (2) times a day. Indications: inadequate vitamin C      tamsulosin (Flomax) 0.4 mg capsule Take 0.4 mg by mouth daily.  tiotropium (Spiriva with HandiHaler) 18 mcg inhalation capsule Take 1 Capsule by inhalation daily.  bumetanide (BUMEX) 2 mg tablet Take 2 mg by mouth two (2) times a day.  ferrous sulfate (Iron) 325 mg (65 mg iron) tablet Take  by mouth Daily (before breakfast).  aspirin 81 mg chewable tablet Take 81 mg by mouth daily.  flunisolide (NASAREL) 25 mcg (0.025 %) spry 2 Sprays two (2) times a day.  albuterol (ProAir HFA) 90 mcg/actuation inhaler Take  by inhalation.  colchicine 0.6 mg tablet Take 0.6 mg by mouth daily.  gabapentin (NEURONTIN) 300 mg capsule Take 300 mg by mouth four (4) times daily.  oxyCODONE IR (ROXICODONE) 5 mg immediate release tablet Take 5 mg by mouth every twelve (12) hours.  metFORMIN (GLUCOPHAGE) 500 mg tablet Take 1,000 mg by mouth two (2) times daily (with meals).  insulin lispro (HumaLOG U-100 Insulin) 100 unit/mL injection by SubCUTAneous route. Sliding scale       No current facility-administered medications on file prior to encounter.        REVIEW OF SYSTEMS    Pertinent items are noted in HPI. Objective:     Visit Vitals  /70 (BP 1 Location: Right upper arm, BP Patient Position: At rest;Sitting)   Pulse (!) 44   Temp 98.3 °F (36.8 °C)   Resp 20   SpO2 98%       Wt Readings from Last 3 Encounters:   12/20/21 (!) 169 kg (372 lb 9.6 oz)   11/08/21 (!) 168.6 kg (371 lb 12.8 oz)   10/25/21 (!) 168.3 kg (371 lb)       PHYSICAL EXAM  R ankle wound slightly larger, L leg wound slightly smaller  No evidence of infection    Pertinent items are noted in HPI. Assessment:    no significant change    Problem List Items Addressed This Visit     None          Wound Leg lower Left;Lateral #1 (Active)   Wound Image   05/11/22 1407   Wound Etiology Venous 05/11/22 1407   Dressing Status Clean;Dry; Intact 05/11/22 1407   Cleansed Soap and water 05/11/22 1407   Wound Length (cm) 9.5 cm 05/11/22 1407   Wound Width (cm) 7.2 cm 05/11/22 1407   Wound Depth (cm) 0.3 cm 05/11/22 1407   Wound Surface Area (cm^2) 68.4 cm^2 05/11/22 1407   Change in Wound Size % 37.82 05/11/22 1407   Wound Volume (cm^3) 20.52 cm^3 05/11/22 1407   Wound Healing % -87 05/11/22 1407   Post-Procedure Length (cm) 9 cm 04/27/22 1429   Post-Procedure Width (cm) 7.5 cm 04/27/22 1429   Post-Procedure Depth (cm) 0.1 cm 04/27/22 1429   Post-Procedure Surface Area (cm^2) 67.5 cm^2 04/27/22 1429   Post-Procedure Volume (cm^3) 6.75 cm^3 04/27/22 1429   Wound Assessment Slough;Pink/red;Granulation tissue 05/11/22 1407   Drainage Amount Moderate 05/11/22 1407   Drainage Description Serosanguinous 05/11/22 1407   Wound Odor None 05/11/22 1407   Alissa-Wound/Incision Assessment Fragile; Maceration 05/11/22 1407   Edges Epibole (rolled edges) 05/11/22 1407   Wound Thickness Description Full thickness 05/11/22 1407   Number of days: 70       Wound Heel Right #2 (Active)   Wound Image   05/11/22 1407   Wound Etiology Venous 05/11/22 1407   Dressing Status Clean;Dry; Intact 05/11/22 1407   Cleansed Soap and water 05/11/22 1407   Wound Length (cm) 1 cm 05/11/22 1407   Wound Width (cm) 1 cm 05/11/22 1407   Wound Depth (cm) 0.1 cm 05/11/22 1407   Wound Surface Area (cm^2) 1 cm^2 05/11/22 1407   Change in Wound Size % 77.12 05/11/22 1407   Wound Volume (cm^3) 0.1 cm^3 05/11/22 1407   Wound Healing % 77 05/11/22 1407   Post-Procedure Length (cm) 0.7 cm 04/06/22 0906   Post-Procedure Width (cm) 4.2 cm 04/06/22 0906   Post-Procedure Depth (cm) 0.2 cm 04/06/22 0906   Post-Procedure Surface Area (cm^2) 2.94 cm^2 04/06/22 0906   Post-Procedure Volume (cm^3) 0.588 cm^3 04/06/22 0906   Wound Assessment Pale granulation tissue;Slough 05/11/22 1407   Drainage Amount Small 05/11/22 1407   Drainage Description Serosanguinous 05/11/22 1407   Wound Odor None 05/11/22 1407   Alissa-Wound/Incision Assessment Intact 05/11/22 1407   Edges Attached edges 05/11/22 1407   Wound Thickness Description Full thickness 05/11/22 1407   Number of days: 49       POP IN PROCEDURE TYPE (DEBRIDEMENT, BIOPSY, I&D, SKIN SUB, CHEMICAL CAUERTY)     Plan:   Continue Aquacel, double tubigrip    Treatment Note please see attached Discharge Instructions    Written patient dismissal instructions given to patient or POA. Electronically signed by Alin Avalos MD on 5/11/2022 at 2:44 PM              Debridement Wound Care        Problem List Items Addressed This Visit     None          Procedure Note  Indications:  Based on my examination of this patient's wound(s)/ulcer(s) today, debridement is required to promote healing and evaluate the wound base.     Performed by: Alin Avalos MD    Consent obtained: Yes    Time out taken: Yes    Debridement: Excisional    Using curette the wound(s)/ulcer(s) was/were sharply debrided down through and including the removal of    subcutaneous tissue    Devitalized Tissue Debrided: slough    Pre Debridement Measurements:  Are located in the [place]  Documentation Flow Sheet    Non-Pressure ulcer, fat layer exposed    Wound/Ulcer #: 1 and 2    Post Debridement Measurements:  Wound/Ulcer Descriptions are Pre Debridement except measurements:    Wound Leg lower Left;Lateral #1 (Active)   Wound Image   05/11/22 1407   Wound Etiology Venous 05/11/22 1407   Dressing Status Clean;Dry; Intact 05/11/22 1407   Cleansed Soap and water 05/11/22 1407   Wound Length (cm) 9.5 cm 05/11/22 1407   Wound Width (cm) 7.2 cm 05/11/22 1407   Wound Depth (cm) 0.3 cm 05/11/22 1407   Wound Surface Area (cm^2) 68.4 cm^2 05/11/22 1407   Change in Wound Size % 37.82 05/11/22 1407   Wound Volume (cm^3) 20.52 cm^3 05/11/22 1407   Wound Healing % -87 05/11/22 1407   Post-Procedure Length (cm) 9 cm 04/27/22 1429   Post-Procedure Width (cm) 7.5 cm 04/27/22 1429   Post-Procedure Depth (cm) 0.1 cm 04/27/22 1429   Post-Procedure Surface Area (cm^2) 67.5 cm^2 04/27/22 1429   Post-Procedure Volume (cm^3) 6.75 cm^3 04/27/22 1429   Wound Assessment Slough;Pink/red;Granulation tissue 05/11/22 1407   Drainage Amount Moderate 05/11/22 1407   Drainage Description Serosanguinous 05/11/22 1407   Wound Odor None 05/11/22 1407   Alissa-Wound/Incision Assessment Fragile; Maceration 05/11/22 1407   Edges Epibole (rolled edges) 05/11/22 1407   Wound Thickness Description Full thickness 05/11/22 1407   Number of days: 70       Wound Heel Right #2 (Active)   Wound Image   05/11/22 1407   Wound Etiology Venous 05/11/22 1407   Dressing Status Clean;Dry; Intact 05/11/22 1407   Cleansed Soap and water 05/11/22 1407   Wound Length (cm) 1 cm 05/11/22 1407   Wound Width (cm) 1 cm 05/11/22 1407   Wound Depth (cm) 0.1 cm 05/11/22 1407   Wound Surface Area (cm^2) 1 cm^2 05/11/22 1407   Change in Wound Size % 77.12 05/11/22 1407   Wound Volume (cm^3) 0.1 cm^3 05/11/22 1407   Wound Healing % 77 05/11/22 1407   Post-Procedure Length (cm) 0.7 cm 04/06/22 0906   Post-Procedure Width (cm) 4.2 cm 04/06/22 0906   Post-Procedure Depth (cm) 0.2 cm 04/06/22 0906   Post-Procedure Surface Area (cm^2) 2.94 cm^2 04/06/22 0906 Post-Procedure Volume (cm^3) 0.588 cm^3 04/06/22 0906   Wound Assessment Pale granulation tissue;Slough 05/11/22 1407   Drainage Amount Small 05/11/22 1407   Drainage Description Serosanguinous 05/11/22 1407   Wound Odor None 05/11/22 1407   Alissa-Wound/Incision Assessment Intact 05/11/22 1407   Edges Attached edges 05/11/22 1407   Wound Thickness Description Full thickness 05/11/22 1407   Number of days: 49        Total Surface Area Debrided:  68 sq cm     Estimated Blood Loss:  Minimal     Hemostasis Achieved: Pressure    Procedural Pain: 2 / 10     Post Procedural Pain: 1 / 10     Response to treatment: Patient tolerated treatment with mild discomfort but relieved after procedure was completed

## 2022-05-11 NOTE — DISCHARGE INSTRUCTIONS
Discharge Instructions  [address] [place] [address], [address] [place] [address]  Telephone: [phone]    NAME:  Tanisha Jules OF BIRTH:  1955  MEDICAL RECORD NUMBER:  [id number]  DATE: 05/11/2022      Return Appointment:  [] Dressing supply provider:   [x] Home Healthcare: [address]    [x] Return Appointment:  2    weeks  [] Nurse Visit:       [] Discharge from [facility]  [] Ordered tests:    [x] Referrals: patient was seeing Dr. Honey Cosby   [] Rx:        Wound Cleansing:   Do not scrub or use excessive force. Cleanse wound prior to applying a clean dressing with:  [x] Normal Saline   [x] Mild Soap & Water    [] Keep Wound Dry in Shower  [] Other:      Topical Treatments:  Do not apply lotions, creams, or ointments to wound bed unless directed. [] Apply moisturizing lotion to skin surrounding the wound prior to dressing change.  [] Apply antifungal ointment to skin surrounding the wound prior to dressing change.  [] Apply thin film of moisture barrier ointment to skin immediately around wound. [] Other:  Betadine to israel-wound     Dressings:           Wound Location Left leg and right heel  [x] Apply Primary Dressing:       [] MediHoney Gel    [] MediHoney Alginate               [] Calcium Alginate      [] Calcium Alginate    [] Collagen                   [] Collagen with Silver                [] Santyl with Moistened saline gauze              [] Hydrofera Blue (cut to size and moistened)  [] Hydrofera Blue Ready (Cut to size)   [] NS wet to dry    [] Betadine wet to dry              [] Hydrogel                [] Mepitel     [] Bactroban/Mupirocin               [x] Other: aquacel-ag     [x] Cover and Secure with:     [x] Gauze [] Shirlyn Snare [] Kerlix   [] Foam [] Super Absorbant [x] ABD     [] ACE Wrap            [] Other:    Avoid contact of tape with skin.     [x] Change dressing: [] Daily    [] Every Other Day [] Once per week   [] Twice per week   [x] Three times per week [] Do Not Change Dressing   [] Other:      Compression:  Apply: [x] Multilayer Compression Wrap: [x]RightLeg [x]Left Leg                                 []Profore  [x]coflex lite             []Unna     [] Do not get leg(s) with wrap wet. If wraps become too tight call the center or completely remove the wrap. [x] Elevate leg(s) above the level of the heart when sitting. [x] Avoid prolonged standing in one place. Dietary:  [x] Diet as tolerated: [] Calorie Diabetic Diet: [x] No Added Salt:  [] Increase Protein: [] Other:     Activity:  [x] Activity as tolerated:    [] Patient has no activity restrictions       [] Strict Bedrest:   [] Remain off Work:       [] May return to full duty work:                                     [] Return to work with restrictions:               [address] Information: Should you experience any significant changes in your wound(s) or have questions about your wound care, please contact Thais Doyle 26 at [place] [address] 8:00 am - 4:30. If you need help with your wound outside of these hours and cannot wait until we are again available, contact your PCP or go to the hospital emergency room. PLEASE NOTE: IF YOU ARE UNABLE TO OBTAIN WOUND SUPPLIES, CONTINUE TO USE THE SUPPLIES YOU HAVE AVAILABLE UNTIL YOU ARE ABLE TO REACH US. IT IS MOST IMPORTANT TO KEEP THE WOUND COVERED AT ALL TIMES.     [x] Dr. Jus Bee   [] Dr. Sahil Montemayor  [] Dean Tah [facility]  [] Dr. Hillary Iyer

## 2022-05-25 ENCOUNTER — HOSPITAL ENCOUNTER (OUTPATIENT)
Dept: WOUND CARE | Age: 67
Discharge: HOME | End: 2022-05-25
Payer: MEDICARE

## 2022-05-25 VITALS
DIASTOLIC BLOOD PRESSURE: 70 MMHG | SYSTOLIC BLOOD PRESSURE: 105 MMHG | TEMPERATURE: 98.1 F | HEART RATE: 94 BPM | RESPIRATION RATE: 20 BRPM | OXYGEN SATURATION: 99 %

## 2022-05-25 DIAGNOSIS — L97.412 ULCER OF RIGHT HEEL, WITH FAT LAYER EXPOSED (HCC): ICD-10-CM

## 2022-05-25 DIAGNOSIS — L97.822 NON-PRESSURE CHRONIC ULCER OF OTHER PART OF LEFT LOWER LEG WITH FAT LAYER EXPOSED (HCC): ICD-10-CM

## 2022-05-25 DIAGNOSIS — I87.312 IDIOPATHIC CHRONIC VENOUS HYPERTENSION OF LEFT LOWER EXTREMITY WITH ULCER (HCC): ICD-10-CM

## 2022-05-25 DIAGNOSIS — R60.0 LOCALIZED EDEMA: ICD-10-CM

## 2022-05-25 DIAGNOSIS — L97.522 ULCER OF LEFT FOOT, WITH FAT LAYER EXPOSED (HCC): ICD-10-CM

## 2022-05-25 DIAGNOSIS — L97.929 IDIOPATHIC CHRONIC VENOUS HYPERTENSION OF LEFT LOWER EXTREMITY WITH ULCER (HCC): ICD-10-CM

## 2022-05-25 PROCEDURE — 74011000250 HC RX REV CODE- 250: Performed by: SPECIALIST

## 2022-05-25 PROCEDURE — 29581 APPL MULTLAYER CMPRN SYS LEG: CPT

## 2022-05-25 RX ORDER — LIDOCAINE HYDROCHLORIDE 20 MG/ML
15 SOLUTION OROPHARYNGEAL AS NEEDED
Status: DISCONTINUED | OUTPATIENT
Start: 2022-05-25 | End: 2022-05-27 | Stop reason: HOSPADM

## 2022-05-25 NOTE — WOUND CARE
[address]   Progress Note and Procedure Note   NO Procedure      [address] RECORD NUMBER:  [id number]  AGE: 78 y.o. RACE WHITE/NON-  GENDER: male  : 1955  EPISODE DATE:  2022    Subjective: Increased drainage especially from LLE wounds  No fever, pain slightly greater  Says that his legs are elevated most of the time    Chief Complaint   Patient presents with    Wound Check     left leg  and right heel         HISTORY of PRESENT ILLNESS HPI     Camryn Olsen is a 78 y.o. male who presents today for wound/ulcer evaluation.    History of Wound Context: BLE  Wound/Ulcer Pain Timing/Severity: intermittent  Quality of pain: aching  Severity:  2 / 10   Modifying Factors: None  Associated Signs/Symptoms: edema    Ulcer Identification:  Ulcer Type: venous    Contributing Factors: lymphedema    Wound: R heel, L leg         PAST MEDICAL HISTORY    Past Medical History:   Diagnosis Date    Arthritis     CAD (coronary artery disease)     Chronic obstructive pulmonary disease ([address]     Diabetes [facility]     Gout     Hypertension     Ill-defined condition     Sleep apnea         PAST SURGICAL HISTORY    Past Surgical History:   Procedure Laterality Date    HX ORTHOPAEDIC      HX PACEMAKER      HX VEIN ABLATION ADHESIVE         FAMILY HISTORY    Family History   Problem Relation Age of Onset    Heart Disease Mother     Kidney Disease Mother     Hypertension Mother     Diabetes Mother     Heart Disease Father     Hypertension Father     Diabetes Sister     Diabetes Brother        SOCIAL HISTORY    Social History     Tobacco Use    Smoking status: Never Smoker    Smokeless tobacco: Never Used   Vaping Use    Vaping Use: Never used   Substance Use Topics    Alcohol use: Not Currently    Drug use: Never       ALLERGIES    Allergies   Allergen Reactions    Elavil Angioedema    Naproxen Unknown (comments)     Pt not sure of reaction    Sulfa (Sulfonamide Antibiotics) Nausea and Vomiting       MEDICATIONS    Current Outpatient Medications on File Prior to Encounter   Medication Sig Dispense Refill    insulin glargine,hum.rec.anlog (TOUJEO MAX U-300 SOLOSTAR SC) 80 Units by SubCUTAneous route two (2) times a day. 80 units in am, 34 units in pm      b complex vitamins tablet Take 1 Tablet by mouth daily.  Entresto 24-26 mg tablet Take 2 Tablets by mouth.  cloNIDine HCL (CATAPRES) 0.1 mg tablet Take 1 Tablet by mouth every twelve (12) hours.  ammonium lactate (AMLACTIN) 12 % topical cream Apply  to affected area as needed for Other (xerosis). rub in to affected area well  Indications: dry skin 280 g 0    vitamin E, dl,tocopheryl acet, (vitamin E acetate) 400 unit capsule Take 2 Capsules by mouth two (2) times a day. 100 Capsule 0    ascorbic acid, vitamin C, (Vitamin C) 500 mg tablet Take 1,000 mg by mouth two (2) times a day. Indications: inadequate vitamin C      tamsulosin (Flomax) 0.4 mg capsule Take 0.4 mg by mouth daily.  tiotropium (Spiriva with HandiHaler) 18 mcg inhalation capsule Take 1 Capsule by inhalation daily.  bumetanide (BUMEX) 2 mg tablet Take 2 mg by mouth two (2) times a day.  ferrous sulfate (Iron) 325 mg (65 mg iron) tablet Take  by mouth Daily (before breakfast).  aspirin 81 mg chewable tablet Take 81 mg by mouth daily.  flunisolide (NASAREL) 25 mcg (0.025 %) spry 2 Sprays two (2) times a day.  albuterol (ProAir HFA) 90 mcg/actuation inhaler Take  by inhalation.  colchicine 0.6 mg tablet Take 0.6 mg by mouth daily.  gabapentin (NEURONTIN) 300 mg capsule Take 300 mg by mouth four (4) times daily.  oxyCODONE IR (ROXICODONE) 5 mg immediate release tablet Take 5 mg by mouth every twelve (12) hours.  metFORMIN (GLUCOPHAGE) 500 mg tablet Take 1,000 mg by mouth two (2) times daily (with meals).       insulin lispro (HumaLOG U-100 Insulin) 100 unit/mL injection by SubCUTAneous route. Sliding scale       No current facility-administered medications on file prior to encounter. REVIEW OF SYSTEMS    Pertinent items are noted in HPI. Objective:     Visit Vitals  /70 (BP 1 Location: Right arm, BP Patient Position: Sitting; At rest)   Pulse 94   Temp 98.1 °F (36.7 °C)   Resp 20   SpO2 99%       Wt Readings from Last 3 Encounters:   12/20/21 (!) 169 kg (372 lb 9.6 oz)   11/08/21 (!) 168.6 kg (371 lb 12.8 oz)   10/25/21 (!) 168.3 kg (371 lb)       PHYSICAL EXAM  The wound on the right medial heel with small, superficial, healthy granulation, no evidence of infection: The left lateral leg wounds are significantly larger, healthy granulation tissue, no evidence of active infection  Pertinent items are noted in HPI. Assessment:   Worsening left leg wounds with increased drainage, may reflect progressive heart failure    Problem List Items Addressed This Visit     Idiopathic chronic venous hypertension of left lower extremity with ulcer (HCC)    Localized edema    Non-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 ([address]    Ulcer of left foot, with fat layer exposed ([address]    Ulcer of right heel, with fat layer exposed ([address]          Procedure Note  Indications:  Based on my examination of this patient's wound(s)/ulcer(s) today, debridement is not required to promote healing and evaluate the wound base. Wound Leg lower Left;Lateral #1 (Active)   Wound Image   05/25/22 1420   Wound Etiology Venous 05/25/22 1420   Dressing Status Clean;Dry; Intact 05/25/22 1420   Cleansed Soap and water 05/25/22 1420   Wound Length (cm) 11.5 cm 05/25/22 1420   Wound Width (cm) 8 cm 05/25/22 1420   Wound Depth (cm) 0.3 cm 05/25/22 1420   Wound Surface Area (cm^2) 92 cm^2 05/25/22 1420   Change in Wound Size % 16.36 05/25/22 1420   Wound Volume (cm^3) 27.6 cm^3 05/25/22 1420   Wound Healing % -151 05/25/22 1420   Post-Procedure Length (cm) 9.5 cm 05/11/22 1457 Post-Procedure Width (cm) 7.2 cm 05/11/22 1457   Post-Procedure Depth (cm) 0.4 cm 05/11/22 1457   Post-Procedure Surface Area (cm^2) 68.4 cm^2 05/11/22 1457   Post-Procedure Volume (cm^3) 27.36 cm^3 05/11/22 1457   Wound Assessment Slough;Pink/red;Granulation tissue 05/25/22 1420   Drainage Amount Large 05/25/22 1420   Drainage Description Serosanguinous; Serous 05/25/22 1420   Wound Odor None 05/25/22 1420   Alissa-Wound/Incision Assessment Fragile; Maceration 05/25/22 1420   Edges Epibole (rolled edges) 05/25/22 1420   Wound Thickness Description Full thickness 05/25/22 1420   Number of days: 84       Wound Heel Right #2 (Active)   Wound Image   05/25/22 1417   Wound Etiology Venous 05/25/22 1417   Dressing Status Clean;Dry; Intact 05/25/22 1417   Cleansed Soap and water 05/25/22 1417   Wound Length (cm) 1 cm 05/25/22 1417   Wound Width (cm) 1 cm 05/25/22 1417   Wound Depth (cm) 0.1 cm 05/25/22 1417   Wound Surface Area (cm^2) 1 cm^2 05/25/22 1417   Change in Wound Size % 77.12 05/25/22 1417   Wound Volume (cm^3) 0.1 cm^3 05/25/22 1417   Wound Healing % 77 05/25/22 1417   Post-Procedure Length (cm) 1 cm 05/11/22 1457   Post-Procedure Width (cm) 1 cm 05/11/22 1457   Post-Procedure Depth (cm) 0.2 cm 05/11/22 1457   Post-Procedure Surface Area (cm^2) 1 cm^2 05/11/22 1457   Post-Procedure Volume (cm^3) 0.2 cm^3 05/11/22 1457   Wound Assessment Pale granulation tissue;Slough 05/25/22 1417   Drainage Amount Small 05/25/22 1417   Drainage Description Serosanguinous 05/25/22 1417   Wound Odor None 05/25/22 1417   Alissa-Wound/Incision Assessment Intact 05/25/22 1417   Edges Attached edges 05/25/22 1417   Wound Thickness Description Full thickness 05/25/22 1417   Number of days: 63        Plan:   Return to using silver cell, drawtex, double Tubigrip compression  Make an appointment with cardiologist to evaluate diuretics and other medications    Treatment Note please see attached Discharge Instructions    Written patient dismissal instructions given to patient or POA.          Electronically signed by Gavin Arcos MD on 5/25/2022 at 3:05 PM

## 2022-05-25 NOTE — WOUND CARE
Multilayer Compression Wrap   (Not Unna) Below the Knee    NAME:  Ilana Acosta OF BIRTH:  1955  MEDICAL RECORD NUMBER:  [id number]  DATE:  5/25/2022    Removed old Multilayer wrap if indicated and wash leg with mild soap/water. Applied moisturizing agent to dry skin as needed. Applied primary and secondary dressing as ordered. Applied multilayered dressing below the knee to right lower leg. Applied multilayered dressing below the knee to left lower leg. Instructed patient/caregiver not to remove dressing and to keep it clean and dry. Instructed patient/caregiver on complications to report to provider, such as pain, numbness in toes, heavy drainage, and slippage of dressing. Instructed patient on purpose of compression dressing and on activity and exercise recommendations.     Response to treatment: Well tolerated by patient       Electronically signed by Fawn Paul RN on 5/25/2022 at 2:55 PM

## 2022-05-25 NOTE — WOUND CARE
Discharge Instructions  [address] [place] [address], [address] [place] [address]  Telephone: [phone]    NAME:  Allison Wilkins OF BIRTH:  1955  MEDICAL RECORD NUMBER:  [id number]  DATE: 05/25/2022      Return Appointment:  [] Dressing supply provider:   [x] Home Healthcare: [address]    [x] Return Appointment:  1   weeks  [] Nurse Visit:       [] Discharge from [facility]  [] Ordered tests:    [x] Referrals: Pt to contact cardiologist for CHF                       patient was seeing Dr. Geena Landaverde   [] Rx:        Wound Cleansing:   Do not scrub or use excessive force. Cleanse wound prior to applying a clean dressing with:  [x] Normal Saline   [x] Mild Soap & Water    [] Keep Wound Dry in Shower  [] Other:      Topical Treatments:  Do not apply lotions, creams, or ointments to wound bed unless directed. [] Apply moisturizing lotion to skin surrounding the wound prior to dressing change.  [] Apply antifungal ointment to skin surrounding the wound prior to dressing change.  [] Apply thin film of moisture barrier ointment to skin immediately around wound. [] Other:  Betadine to israel-wound     Dressings:           Wound Location Left leg and right heel  [x] Apply Primary Dressing:       [] MediHoney Gel    [] MediHoney Alginate               [] Calcium Alginate      [x] Calcium Alginate with silver- Silvercel   [] Collagen                   [] Collagen with Silver                [] Santyl with Moistened saline gauze              [] Hydrofera Blue (cut to size and moistened)  [] Hydrofera Blue Ready (Cut to size)   [] NS wet to dry    [] Betadine wet to dry              [] Hydrogel                [] Mepitel     [] Bactroban/Mupirocin               [] Other:      [x] Cover and Secure with:     [x] Gauze [] Graciela Plank [] Kerlix   [] Foam [x] Super Absorbant [x] ABD     [] ACE Wrap            [] Other:    Avoid contact of tape with skin.     [x] Change dressing: [] Daily    [] Every Other Day [] Once per week   [] Twice per week   [x] Three times per week [] Do Not Change Dressing   [] Other:      Compression:  Apply: [x] Multilayer Compression Wrap: [x]RightLeg [x]Left Leg                                 []Profore  [x]coflex lite             []Unna     [] Do not get leg(s) with wrap wet. If wraps become too tight call the center or completely remove the wrap. [x] Elevate leg(s) above the level of the heart when sitting. [x] Avoid prolonged standing in one place. Dietary:  [x] Diet as tolerated: [] Calorie Diabetic Diet: [x] No Added Salt:  [] Increase Protein: [] Other:     Activity:  [x] Activity as tolerated:    [] Patient has no activity restrictions       [] Strict Bedrest:   [] Remain off Work:       [] May return to full duty work:                                     [] Return to work with restrictions:               [address] Information: Should you experience any significant changes in your wound(s) or have questions about your wound care, please contact Thais Doyle 26 at [place] [address] 8:00 am - 4:30. If you need help with your wound outside of these hours and cannot wait until we are again available, contact your PCP or go to the hospital emergency room. PLEASE NOTE: IF YOU ARE UNABLE TO OBTAIN WOUND SUPPLIES, CONTINUE TO USE THE SUPPLIES YOU HAVE AVAILABLE UNTIL YOU ARE ABLE TO REACH US. IT IS MOST IMPORTANT TO KEEP THE WOUND COVERED AT ALL TIMES.     [x] Dr. Richard Mathews   [] Dr. Armando Lerma  [] Kailey Peterson [facility]  [] Dr. Dania Salas

## 2022-05-25 NOTE — DISCHARGE INSTRUCTIONS
Discharge Instructions  [address] [place] [address], [facility]  Telephone: [phone]     NAME:  Page Hancock OF BIRTH:  1955  MEDICAL RECORD NUMBER:  [id number]  DATE: 05/25/2022        Return Appointment:  []? Dressing supply provider:   [x]? Home Healthcare: [facility] [address]    [x]? Return Appointment:  1   weeks  []? Nurse Visit:        []? [facility]charge from [facility]  []? Ordered tests:    [x]? Referrals: Pt to contact cardiologist for CHF                       patient was seeing Dr. Cali Quinn   []? Rx:         Wound Cleansing:   Do not scrub or use excessive force. Cleanse wound prior to applying a clean dressing with:  [x]? Normal Saline         [x]? Mild Soap & Water    []? Keep Wound Dry in Shower  []? Other:       Topical Treatments:  Do not apply lotions, creams, or ointments to wound bed unless directed. []? Apply moisturizing lotion to skin surrounding the wound prior to dressing change. []? Apply antifungal ointment to skin surrounding the wound prior to dressing change. []? Apply thin film of moisture barrier ointment to skin immediately around wound. []? Other:  Betadine to israel-wound                Dressings:                  Wound Location Left leg and right heel  [x]? Apply Primary Dressing:                                          []? MediHoney Gel         []? MediHoney Alginate                     []? Calcium Alginate      [x]? Calcium Alginate with silver- Silvercel              []? Collagen                   []? Collagen with Silver                []? Santyl with Moistened saline gauze              []? Hydrofera Blue (cut to size and moistened)  []? Hydrofera Blue Ready (Cut to size)              []? NS wet to dry            []? Betadine wet to dry              []? Hydrogel                   []? Mepitel                   []? Bactroban/Mupirocin                       []? Other:       [x]?  Cover and Secure with:                   [x]? Gauze        []? Geraldine Gaines           []? Kerlix              []? Foam          [x]? Super Absorbant    [x]? ABD                                     []? ACE Wrap            []? Other:               Avoid contact of tape with skin.     [x]? Change dressing:            []? Daily           []? Every Other Day    []? Once per week   []? Twice per week              [x]? Three times per week      []? Do Not Change Dressing    []? Other:                 Compression:  Apply:  [x]? Multilayer Compression Wrap:      [x]? RightLeg    [x]? Left Leg                                 []?Profore            [x]?coflex lite             []?Unna                 []? Do not get leg(s) with wrap wet. If wraps become too tight call the center or completely remove the wrap. [x]? Elevate leg(s) above the level of the heart when sitting. [x]? Avoid prolonged standing in one place.     Dietary:  [x]? Diet as tolerated: []? Calorie Diabetic Diet:         [x]? No Added Salt:  []? Increase Protein:   []? Other:              Activity:  [x]? Activity as tolerated:    []? Patient has no activity restrictions       []? Strict Bedrest:          []? Remain off Work:       []? May return to full duty work:                                               []? Return to work with restrictions:      [facility]: Should you experience any significant changes in your wound(s) or have questions about your wound care, please contact Thais Caldera at [place] [address] 8:00 am - 4:30. If you need help with your wound outside of these hours and cannot wait until we are again available, contact your PCP or go to the hospital emergency room.      PLEASE NOTE: IF [facility], CONTINUE TO USE THE SUPPLIES YOU HAVE AVAILABLE UNTIL YOU ARE ABLE TO [facility]. IT IS MOST IMPORTANT TO KEEP THE WOUND COVERED AT ALL TIMES.     [x]?   Griselda Cheney   []? Dr. Damion Kinney  []? [place]  []?  Dr. Lane Brown

## 2022-06-01 ENCOUNTER — HOSPITAL ENCOUNTER (OUTPATIENT)
Dept: WOUND CARE | Age: 67
Discharge: HOME | End: 2022-06-01
Payer: MEDICARE

## 2022-06-01 VITALS
DIASTOLIC BLOOD PRESSURE: 56 MMHG | RESPIRATION RATE: 22 BRPM | HEART RATE: 100 BPM | SYSTOLIC BLOOD PRESSURE: 106 MMHG | TEMPERATURE: 97.1 F

## 2022-06-01 DIAGNOSIS — L97.929 IDIOPATHIC CHRONIC VENOUS HYPERTENSION OF LEFT LOWER EXTREMITY WITH ULCER (HCC): ICD-10-CM

## 2022-06-01 DIAGNOSIS — R60.0 LOCALIZED EDEMA: ICD-10-CM

## 2022-06-01 DIAGNOSIS — L97.822 NON-PRESSURE CHRONIC ULCER OF OTHER PART OF LEFT LOWER LEG WITH FAT LAYER EXPOSED (HCC): ICD-10-CM

## 2022-06-01 DIAGNOSIS — L97.412 ULCER OF RIGHT HEEL, WITH FAT LAYER EXPOSED (HCC): ICD-10-CM

## 2022-06-01 DIAGNOSIS — I87.312 IDIOPATHIC CHRONIC VENOUS HYPERTENSION OF LEFT LOWER EXTREMITY WITH ULCER (HCC): ICD-10-CM

## 2022-06-01 DIAGNOSIS — L97.522 ULCER OF LEFT FOOT, WITH FAT LAYER EXPOSED (HCC): ICD-10-CM

## 2022-06-01 PROCEDURE — 11042 DBRDMT SUBQ TIS 1ST 20SQCM/<: CPT

## 2022-06-01 PROCEDURE — 29581 APPL MULTLAYER CMPRN SYS LEG: CPT

## 2022-06-01 PROCEDURE — 74011000250 HC RX REV CODE- 250: Performed by: SPECIALIST

## 2022-06-01 PROCEDURE — 11045 DBRDMT SUBQ TISS EACH ADDL: CPT

## 2022-06-01 RX ORDER — LIDOCAINE HYDROCHLORIDE 20 MG/ML
15 SOLUTION OROPHARYNGEAL AS NEEDED
Status: DISCONTINUED | OUTPATIENT
Start: 2022-06-01 | End: 2022-06-03 | Stop reason: HOSPADM

## 2022-06-01 NOTE — WOUND CARE
[address]   Progress Note and Procedure Note     [address] RECORD NUMBER:  [id number]  AGE: 78 y.o. RACE WHITE/NON-  GENDER: male  : 1955  EPISODE DATE:  2022    Subjective:   Denies pain or increased drainage  Chief Complaint   Patient presents with    Wound Check     BLE         HISTORY of PRESENT ILLNESS JAZZY Gil is a 78 y.o. male who presents today for wound/ulcer evaluation.    History of Wound Context: R heel, L leg  Wound/Ulcer Pain Timing/Severity: none  Quality of pain: dull  Severity:  0 / 10   Modifying Factors: None  Associated Signs/Symptoms: edema    Ulcer Identification:  Ulcer Type: venous    Contributing Factors: lymphedema    Wound: R heel, L leg         PAST MEDICAL HISTORY    Past Medical History:   Diagnosis Date    Arthritis     CAD (coronary artery disease)     Chronic obstructive pulmonary disease (HCC)     Diabetes ([facility] [address]     Gout     Hypertension     Ill-defined condition     Sleep apnea         PAST SURGICAL HISTORY    Past Surgical History:   Procedure Laterality Date    HX ORTHOPAEDIC      HX PACEMAKER      HX VEIN ABLATION ADHESIVE         FAMILY HISTORY    Family History   Problem Relation Age of Onset    Heart Disease Mother     Kidney Disease Mother     Hypertension Mother     Diabetes Mother     Heart Disease Father     Hypertension Father     Diabetes Sister     Diabetes Brother        SOCIAL HISTORY    Social History     Tobacco Use    Smoking status: Never Smoker    Smokeless tobacco: Never Used   Vaping Use    Vaping Use: Never used   Substance Use Topics    Alcohol use: Not Currently    Drug use: Never       ALLERGIES    Allergies   Allergen Reactions    Elavil Angioedema    Naproxen Unknown (comments)     Pt not sure of reaction    Sulfa (Sulfonamide Antibiotics) Nausea and Vomiting       MEDICATIONS    Current Outpatient Medications on File Prior to Encounter   Medication Sig Dispense Refill    insulin glargine,hum.rec.anlog (TOUJEO MAX U-300 SOLOSTAR SC) 80 Units by SubCUTAneous route two (2) times a day. 80 units in am, 34 units in pm      b complex vitamins tablet Take 1 Tablet by mouth daily.  Entresto 24-26 mg tablet Take 2 Tablets by mouth.  cloNIDine HCL (CATAPRES) 0.1 mg tablet Take 1 Tablet by mouth every twelve (12) hours.  ammonium lactate (AMLACTIN) 12 % topical cream Apply  to affected area as needed for Other (xerosis). rub in to affected area well  Indications: dry skin 280 g 0    vitamin E, dl,tocopheryl acet, (vitamin E acetate) 400 unit capsule Take 2 Capsules by mouth two (2) times a day. 100 Capsule 0    ascorbic acid, vitamin C, (Vitamin C) 500 mg tablet Take 1,000 mg by mouth two (2) times a day. Indications: inadequate vitamin C      tamsulosin (Flomax) 0.4 mg capsule Take 0.4 mg by mouth daily.  tiotropium (Spiriva with HandiHaler) 18 mcg inhalation capsule Take 1 Capsule by inhalation daily.  bumetanide (BUMEX) 2 mg tablet Take 2 mg by mouth two (2) times a day.  ferrous sulfate (Iron) 325 mg (65 mg iron) tablet Take  by mouth Daily (before breakfast).  aspirin 81 mg chewable tablet Take 81 mg by mouth daily.  flunisolide (NASAREL) 25 mcg (0.025 %) spry 2 Sprays two (2) times a day.  albuterol (ProAir HFA) 90 mcg/actuation inhaler Take  by inhalation.  colchicine 0.6 mg tablet Take 0.6 mg by mouth daily.  gabapentin (NEURONTIN) 300 mg capsule Take 300 mg by mouth four (4) times daily.  oxyCODONE IR (ROXICODONE) 5 mg immediate release tablet Take 5 mg by mouth every twelve (12) hours.  metFORMIN (GLUCOPHAGE) 500 mg tablet Take 1,000 mg by mouth two (2) times daily (with meals).  insulin lispro (HumaLOG U-100 Insulin) 100 unit/mL injection by SubCUTAneous route. Sliding scale       No current facility-administered medications on file prior to encounter.        REVIEW OF SYSTEMS    Pertinent items are noted in HPI. Objective:     Visit Vitals  BP (!) 106/56 (BP 1 Location: Right arm, BP Patient Position: At rest;Sitting)   Pulse 100   Temp 97.1 °F (36.2 °C)   Resp 22       Wt Readings from Last 3 Encounters:   12/20/21 (!) 169 kg (372 lb 9.6 oz)   11/08/21 (!) 168.6 kg (371 lb 12.8 oz)   10/25/21 (!) 168.3 kg (371 lb)       PHYSICAL EXAM  Right medial heel wounds x2 are small, superficial, no evidence of infection  Left lateral lower extremity wounds measure slightly larger, moderate slough debrided, no evidence of active infection  Pertinent items are noted in HPI. Assessment:   No significant improvement  Problem List Items Addressed This Visit     Idiopathic chronic venous hypertension of left lower extremity with ulcer (HCC)    Localized edema    Non-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 ([address]    Ulcer of left foot, with fat layer exposed ([address]    Ulcer of right heel, with fat layer exposed ([address]          Wound Leg lower Left;Lateral #1 (Active)   Wound Image   06/01/22 1419   Wound Etiology Venous 06/01/22 1419   Dressing Status Clean;Dry; Intact 06/01/22 1419   Cleansed Soap and water 06/01/22 1419   Wound Length (cm) 11.2 cm 06/01/22 1419   Wound Width (cm) 8.2 cm 06/01/22 1419   Wound Depth (cm) 0.2 cm 06/01/22 1419   Wound Surface Area (cm^2) 91.84 cm^2 06/01/22 1419   Change in Wound Size % 16.51 06/01/22 1419   Wound Volume (cm^3) 18.368 cm^3 06/01/22 1419   Wound Healing % -67 06/01/22 1419   Post-Procedure Length (cm) 11.2 cm 06/01/22 1428   Post-Procedure Width (cm) 8.2 cm 06/01/22 1428   Post-Procedure Depth (cm) 0.2 cm 06/01/22 1428   Post-Procedure Surface Area (cm^2) 91.84 cm^2 06/01/22 1428   Post-Procedure Volume (cm^3) 18.368 cm^3 06/01/22 1428   Wound Assessment Slough;Pink/red;Granulation tissue 06/01/22 1419   Drainage Amount Large 06/01/22 1419   Drainage Description Serosanguinous 06/01/22 1419   Wound Odor None 06/01/22 1419   Alissa-Wound/Incision Assessment Intact 06/01/22 1419   Edges Attached edges 06/01/22 1419   Wound Thickness Description Full thickness 06/01/22 1419   Number of days: 91       Wound Heel Right #2 (Active)   Wound Image   06/01/22 1420   Wound Etiology Venous 06/01/22 1420   Dressing Status Clean;Dry; Intact 06/01/22 1420   Cleansed Soap and water 06/01/22 1420   Wound Length (cm) 0.7 cm 06/01/22 1420   Wound Width (cm) 0.7 cm 06/01/22 1420   Wound Depth (cm) 0.1 cm 06/01/22 1420   Wound Surface Area (cm^2) 0.49 cm^2 06/01/22 1420   Change in Wound Size % 88.79 06/01/22 1420   Wound Volume (cm^3) 0.049 cm^3 06/01/22 1420   Wound Healing % 89 06/01/22 1420   Post-Procedure Length (cm) 1 cm 05/11/22 1457   Post-Procedure Width (cm) 1 cm 05/11/22 1457   Post-Procedure Depth (cm) 0.2 cm 05/11/22 1457   Post-Procedure Surface Area (cm^2) 1 cm^2 05/11/22 1457   Post-Procedure Volume (cm^3) 0.2 cm^3 05/11/22 1457   Wound Assessment Pale granulation tissue;Slough 06/01/22 1420   Drainage Amount Small 06/01/22 1420   Drainage Description Serosanguinous 06/01/22 1420   Wound Odor None 06/01/22 1420   Alissa-Wound/Incision Assessment Intact 06/01/22 1420   Edges Attached edges 06/01/22 1420   Wound Thickness Description Full thickness 06/01/22 1420   Number of days: 70       POP IN PROCEDURE TYPE (DEBRIDEMENT, BIOPSY, I&D, SKIN SUB, CHEMICAL CAUERTY)     Plan:   Continue silver cell, drawtex, Profore light compression    Treatment Note please see attached Discharge Instructions    Written patient dismissal instructions given to patient or POA.          Electronically signed by Freda Melo MD on 6/1/2022 at 2:34 PM              Debridement Wound Care        Problem List Items Addressed This Visit     Idiopathic chronic venous hypertension of left lower extremity with ulcer ([address]    Localized edema    Non-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 ([address]    Ulcer of left foot, with fat layer exposed ([address]    Ulcer of right heel, with fat layer exposed ([address]          Procedure Note  Indications:  Based on my examination of this patient's wound(s)/ulcer(s) today, debridement is required to promote healing and evaluate the wound base. Performed by: Oscar Benitez MD    Consent obtained: Yes    Time out taken: Yes    Debridement: Excisional    Using curette the wound(s)/ulcer(s) was/were sharply debrided down through and including the removal of    subcutaneous tissue    Devitalized Tissue Debrided: slough    Pre Debridement Measurements:  Are located in the Wound/Ulcer Documentation Flow Sheet    Non-Pressure ulcer, fat layer exposed    Wound/Ulcer #: 1    Post Debridement Measurements:  Wound/Ulcer Descriptions are Pre Debridement except measurements:    Wound Leg lower Left;Lateral #1 (Active)   Wound Image   06/01/22 1419   Wound Etiology Venous 06/01/22 1419   Dressing Status Clean;Dry; Intact 06/01/22 1419   Cleansed Soap and water 06/01/22 1419   Wound Length (cm) 11.2 cm 06/01/22 1419   Wound Width (cm) 8.2 cm 06/01/22 1419   Wound Depth (cm) 0.2 cm 06/01/22 1419   Wound Surface Area (cm^2) 91.84 cm^2 06/01/22 1419   Change in Wound Size % 16.51 06/01/22 1419   Wound Volume (cm^3) 18.368 cm^3 06/01/22 1419   Wound Healing % -67 06/01/22 1419   Post-Procedure Length (cm) 11.2 cm 06/01/22 1428   Post-Procedure Width (cm) 8.2 cm 06/01/22 1428   Post-Procedure Depth (cm) 0.2 cm 06/01/22 1428   Post-Procedure Surface Area (cm^2) 91.84 cm^2 06/01/22 1428   Post-Procedure Volume (cm^3) 18.368 cm^3 06/01/22 1428   Wound Assessment Slough;Pink/red;Granulation tissue 06/01/22 1419   Drainage Amount Large 06/01/22 1419   Drainage Description Serosanguinous 06/01/22 1419   Wound Odor None 06/01/22 1419   Alissa-Wound/Incision Assessment Intact 06/01/22 1419   Edges Attached edges 06/01/22 1419   Wound Thickness Description Full thickness 06/01/22 1419   Number of days: 91       Wound Heel Right #2 (Active)   Wound Image   06/01/22 1420   Wound Etiology Venous 06/01/22 1420   Dressing Status Clean;Dry; Intact 06/01/22 1420   Cleansed Soap and water 06/01/22 1420   Wound Length (cm) 0.7 cm 06/01/22 1420   Wound Width (cm) 0.7 cm 06/01/22 1420   Wound Depth (cm) 0.1 cm 06/01/22 1420   Wound Surface Area (cm^2) 0.49 cm^2 06/01/22 1420   Change in Wound Size % 88.79 06/01/22 1420   Wound Volume (cm^3) 0.049 cm^3 06/01/22 1420   Wound Healing % 89 06/01/22 1420   Post-Procedure Length (cm) 1 cm 05/11/22 1457   Post-Procedure Width (cm) 1 cm 05/11/22 1457   Post-Procedure Depth (cm) 0.2 cm 05/11/22 1457   Post-Procedure Surface Area (cm^2) 1 cm^2 05/11/22 1457   Post-Procedure Volume (cm^3) 0.2 cm^3 05/11/22 1457   Wound Assessment Pale granulation tissue;Slough 06/01/22 1420   Drainage Amount Small 06/01/22 1420   Drainage Description Serosanguinous 06/01/22 1420   Wound Odor None 06/01/22 1420   Alissa-Wound/Incision Assessment Intact 06/01/22 1420   Edges Attached edges 06/01/22 1420   Wound Thickness Description Full thickness 06/01/22 1420   Number of days: 70        Total Surface Area Debrided:  91.84 sq cm     Estimated Blood Loss:  Minimal     Hemostasis Achieved: Pressure    Procedural Pain: 2 / 10     Post Procedural Pain: 1 / 10     Response to treatment: Patient tolerated treatment with mild discomfort but relieved after procedure was completed

## 2022-06-01 NOTE — WOUND CARE
Multilayer Compression Wrap   (Not Unna) Below the Knee    NAME:  Rocio Cheney OF BIRTH:  1955  MEDICAL RECORD NUMBER:  [id number]  DATE:  6/1/2022    Removed old Multilayer wrap if indicated and wash leg with mild soap/water. Applied moisturizing agent to dry skin as needed. Applied primary and secondary dressing as ordered. Applied multilayered dressing below the knee to right lower leg. Applied multilayered dressing below the knee to left lower leg. Instructed patient/caregiver not to remove dressing and to keep it clean and dry. Instructed patient/caregiver on complications to report to provider, such as pain, numbness in toes, heavy drainage, and slippage of dressing. Instructed patient on purpose of compression dressing and on activity and exercise recommendations.     Response to treatment: Well tolerated by patient       Electronically signed by Krystin Marie RN on 6/1/2022 at 2:50 PM

## 2022-06-01 NOTE — DISCHARGE INSTRUCTIONS
Discharge Instructions  [address] [place] [address], [address] [place] [address]  Telephone: [phone]    NAME:  Aysha Gastelum OF BIRTH:  1955  MEDICAL RECORD NUMBER:  [id number]  DATE: 06/01/2022       Return Appointment:  [] Dressing supply provider:   [x] [facility] [address] [facility]    [x] Return Appointment:  1   weeks  [] Nurse Visit:       [] Discharge from [facility]  [] Ordered tests:    [x] Referrals: Pt to contact cardiologist for CHF. Call about pacemaker not working properly. patient was seeing Dr. Jony Mehta   [] Rx:        Wound Cleansing:   Do not scrub or use excessive force. Cleanse wound prior to applying a clean dressing with:  [x] Normal Saline   [x] Mild Soap & Water    [] Keep Wound Dry in Shower  [] Other:      Topical Treatments:  Do not apply lotions, creams, or ointments to wound bed unless directed. [] Apply moisturizing lotion to skin surrounding the wound prior to dressing change.  [] Apply antifungal ointment to skin surrounding the wound prior to dressing change.  [] Apply thin film of moisture barrier ointment to skin immediately around wound.   [] Other:  Betadine to israel-wound     Dressings:           Wound Location Left leg and right heel  [x] Apply Primary Dressing:       [] MediHoney Gel    [] MediHoney Alginate               [] Calcium Alginate      [x] Calcium Alginate with silver- Silvercel   [] Collagen                   [] Collagen with Silver                [] Santyl with Moistened saline gauze              [] Hydrofera Blue (cut to size and moistened)  [] Hydrofera Blue Ready (Cut to size)   [] NS wet to dry    [] Betadine wet to dry              [] Hydrogel                [] Mepitel     [] Bactroban/Mupirocin               [] Other:      [x] Cover and Secure with:     [x] Gauze [] Terra Halsted [] Kerlix   [] Foam [x] Super Absorbant [x] ABD     [] ACE Wrap            [] Other:    Avoid contact of tape with skin. [x] Change dressing: [] Daily    [] Every Other Day [] Once per week   [] Twice per week   [x] Three times per week [] Do Not Change Dressing   [] Other:      Compression:  Apply: [x] Multilayer Compression Wrap: [x]RightLeg [x]Left Leg                                 []Profore  [x]coflex lite or profore lite            []Unna     [x] Do not get leg(s) with wrap wet. If wraps become too tight call the center or completely remove the wrap. [x] Elevate leg(s) above the level of the heart when sitting. [x] Avoid prolonged standing in one place. Dietary:  [x] Diet as tolerated: [] Calorie Diabetic Diet: [x] No Added Salt:  [] Increase Protein: [] Other:     Activity:  [x] Activity as tolerated:    [] Patient has no activity restrictions       [] Strict Bedrest:   [] Remain off Work:       [] May return to full duty work:                                     [] Return to work with restrictions:               [address] Information: Should you experience any significant changes in your wound(s) or have questions about your wound care, please contact Thais Caldera at [place] [phone] 8:00 am - 4:30. If you need help with your wound outside of these hours and cannot wait until we are again available, contact your PCP or go to the hospital emergency room. PLEASE NOTE: IF YOU ARE UNABLE TO OBTAIN WOUND SUPPLIES, CONTINUE TO USE THE SUPPLIES YOU HAVE AVAILABLE UNTIL YOU ARE ABLE TO REACH US. IT IS MOST IMPORTANT TO KEEP THE WOUND COVERED AT ALL TIMES.     [x] Dr. Marlene Stock   [] Dr. Monika Dorantes  [] Edwardo Velazco [facility]  [] Dr. Machelle Tate

## 2022-06-01 NOTE — WOUND CARE
Discharge Instructions  [address] [place] [address], [address] [place] [address]  Telephone: [phone]    NAME:  Alma Guallpa OF BIRTH:  1955  MEDICAL RECORD NUMBER:  [id number]  DATE: 06/01/2022       Return Appointment:  [] Dressing supply provider:   [x] Home Healthcare: [address]    [x] Return Appointment:  1   weeks  [] Nurse Visit:       [] Discharge from [facility]  [] Ordered tests:    [x] Referrals: Pt to contact cardiologist for CHF. Call about pacemaker not working properly. patient was seeing Dr. Kvng [place]   [] Rx:        Wound Cleansing:   Do not scrub or use excessive force. Cleanse wound prior to applying a clean dressing with:  [x] Normal Saline   [x] Mild Soap & Water    [] Keep Wound Dry in Shower  [] Other:      Topical Treatments:  Do not apply lotions, creams, or ointments to wound bed unless directed. [] Apply moisturizing lotion to skin surrounding the wound prior to dressing change.  [] Apply antifungal ointment to skin surrounding the wound prior to dressing change.  [] Apply thin film of moisture barrier ointment to skin immediately around wound.   [] Other:  Betadine to israel-wound     Dressings:           Wound Location Left leg and right heel  [x] Apply Primary Dressing:       [] MediHoney Gel    [] MediHoney Alginate               [] Calcium Alginate      [x] Calcium Alginate with silver- Silvercel   [] Collagen                   [] Collagen with Silver                [] Santyl with Moistened saline gauze              [] Hydrofera Blue (cut to size and moistened)  [] Hydrofera Blue Ready (Cut to size)   [] NS wet to dry    [] Betadine wet to dry              [] Hydrogel                [] Mepitel     [] Bactroban/Mupirocin               [] Other:      [x] Cover and Secure with:     [x] Gauze [] Darryle Colder [] Kerlix   [] Foam [x] Super Absorbant [x] ABD     [] ACE Wrap            [] Other:    Avoid contact of tape with skin. [x] Change dressing: [] Daily    [] Every Other Day [] Once per week   [] Twice per week   [x] Three times per week [] Do Not Change Dressing   [] Other:      Compression:  Apply: [x] Multilayer Compression Wrap: [x]RightLeg [x]Left Leg                                 []Profore  [x] profore lite            []Unna     [x] Do not get leg(s) with wrap wet. If wraps become too tight call the center or completely remove the wrap. [x] Elevate leg(s) above the level of the heart when sitting. [x] Avoid prolonged standing in one place. Dietary:  [x] Diet as tolerated: [] Calorie Diabetic Diet: [x] No Added Salt:  [] Increase Protein: [] Other:     Activity:  [x] Activity as tolerated:    [] Patient has no activity restrictions       [] Strict Bedrest:   [] Remain off Work:       [] May return to full duty work:                                     [] Return to work with restrictions:               [address] Information: Should you experience any significant changes in your wound(s) or have questions about your wound care, please contact Thais Doyle 26 at [place] [phone] 8:00 am - 4:30. If you need help with your wound outside of these hours and cannot wait until we are again available, contact your PCP or go to the hospital emergency room. PLEASE NOTE: IF YOU ARE UNABLE TO OBTAIN WOUND SUPPLIES, CONTINUE TO USE THE SUPPLIES YOU HAVE AVAILABLE UNTIL YOU ARE ABLE TO REACH US. IT IS MOST IMPORTANT TO KEEP THE WOUND COVERED AT ALL TIMES.     [x] Dr. Cherri Watson   [] Dr. Niki Connolly  [] [facility]  [] Dr. Bryant Lyle

## 2022-06-08 ENCOUNTER — HOSPITAL ENCOUNTER (OUTPATIENT)
Dept: WOUND CARE | Age: 67
Discharge: HOME | End: 2022-06-08
Payer: MEDICARE

## 2022-06-08 VITALS
HEART RATE: 94 BPM | DIASTOLIC BLOOD PRESSURE: 71 MMHG | RESPIRATION RATE: 22 BRPM | SYSTOLIC BLOOD PRESSURE: 95 MMHG | TEMPERATURE: 98.2 F

## 2022-06-08 DIAGNOSIS — L97.412 ULCER OF RIGHT HEEL, WITH FAT LAYER EXPOSED (HCC): ICD-10-CM

## 2022-06-08 DIAGNOSIS — I87.312 IDIOPATHIC CHRONIC VENOUS HYPERTENSION OF LEFT LOWER EXTREMITY WITH ULCER (HCC): ICD-10-CM

## 2022-06-08 DIAGNOSIS — L97.522 ULCER OF LEFT FOOT, WITH FAT LAYER EXPOSED (HCC): ICD-10-CM

## 2022-06-08 DIAGNOSIS — L97.929 IDIOPATHIC CHRONIC VENOUS HYPERTENSION OF LEFT LOWER EXTREMITY WITH ULCER (HCC): ICD-10-CM

## 2022-06-08 DIAGNOSIS — L97.822 NON-PRESSURE CHRONIC ULCER OF OTHER PART OF LEFT LOWER LEG WITH FAT LAYER EXPOSED (HCC): ICD-10-CM

## 2022-06-08 DIAGNOSIS — R60.0 LOCALIZED EDEMA: ICD-10-CM

## 2022-06-08 PROCEDURE — 29581 APPL MULTLAYER CMPRN SYS LEG: CPT

## 2022-06-08 PROCEDURE — 29581 APPL MULTLAYER CMPRN SYS LEG: CPT | Performed by: SPECIALIST

## 2022-06-08 PROCEDURE — 74011000250 HC RX REV CODE- 250: Performed by: SPECIALIST

## 2022-06-08 RX ORDER — LIDOCAINE HYDROCHLORIDE 20 MG/ML
15 SOLUTION OROPHARYNGEAL AS NEEDED
Status: DISCONTINUED | OUTPATIENT
Start: 2022-06-08 | End: 2022-06-10 | Stop reason: HOSPADM

## 2022-06-08 NOTE — WOUND CARE
[address]   Progress Note and Procedure Note   NO Procedure      [address] RECORD NUMBER:  [id number]  AGE: 78 y.o. RACE WHITE/NON-  GENDER: male  : 1955  EPISODE DATE:  2022    Subjective:   Patient saw his cardiologist, was started on a new diabetic medication, his pacemaker apparently is working fine, no other problems identified    Chief Complaint   Patient presents with    Wound Check     BLE         HISTORY of PRESENT ILLNESS HPI    Camryn Olsen is a 78 y.o. male who presents today for wound/ulcer evaluation.    History of Wound Context: BLE  Wound/Ulcer Pain Timing/Severity: mild  Quality of pain: aching  Severity:  1 / 10   Modifying Factors: Pain is relieved/improved with rest  Associated Signs/Symptoms: edema    Ulcer Identification:  Ulcer Type: venous    Contributing Factors: lymphedema    Wound: BLE         PAST MEDICAL HISTORY    Past Medical History:   Diagnosis Date    Arthritis     CAD (coronary artery disease)     Chronic obstructive pulmonary disease (HCC)     Diabetes ([address]     Gout     Hypertension     Ill-defined condition     Sleep apnea         PAST SURGICAL HISTORY    Past Surgical History:   Procedure Laterality Date    HX ORTHOPAEDIC      HX PACEMAKER      HX VEIN ABLATION ADHESIVE         FAMILY HISTORY    Family History   Problem Relation Age of Onset    Heart Disease Mother     Kidney Disease Mother     Hypertension Mother     Diabetes Mother     Heart Disease Father     Hypertension Father     Diabetes Sister     Diabetes Brother        SOCIAL HISTORY    Social History     Tobacco Use    Smoking status: Never Smoker    Smokeless tobacco: Never Used   Vaping Use    Vaping Use: Never used   Substance Use Topics    Alcohol use: Not Currently    Drug use: Never       ALLERGIES    Allergies   Allergen Reactions    Elavil Angioedema    Naproxen Unknown (comments)     Pt not sure of reaction    Sulfa (Sulfonamide Antibiotics) Nausea and Vomiting       MEDICATIONS    Current Outpatient Medications on File Prior to Encounter   Medication Sig Dispense Refill    insulin glargine,hum.rec.anlog (TOUJEO MAX U-300 SOLOSTAR SC) 80 Units by SubCUTAneous route two (2) times a day. 80 units in am, 34 units in pm      b complex vitamins tablet Take 1 Tablet by mouth daily.  Entresto 24-26 mg tablet Take 2 Tablets by mouth.  cloNIDine HCL (CATAPRES) 0.1 mg tablet Take 1 Tablet by mouth every twelve (12) hours.  ammonium lactate (AMLACTIN) 12 % topical cream Apply  to affected area as needed for Other (xerosis). rub in to affected area well  Indications: dry skin 280 g 0    vitamin E, dl,tocopheryl acet, (vitamin E acetate) 400 unit capsule Take 2 Capsules by mouth two (2) times a day. 100 Capsule 0    ascorbic acid, vitamin C, (Vitamin C) 500 mg tablet Take 1,000 mg by mouth two (2) times a day. Indications: inadequate vitamin C      tamsulosin (Flomax) 0.4 mg capsule Take 0.4 mg by mouth daily.  tiotropium (Spiriva with HandiHaler) 18 mcg inhalation capsule Take 1 Capsule by inhalation daily.  bumetanide (BUMEX) 2 mg tablet Take 2 mg by mouth two (2) times a day.  ferrous sulfate (Iron) 325 mg (65 mg iron) tablet Take  by mouth Daily (before breakfast).  aspirin 81 mg chewable tablet Take 81 mg by mouth daily.  flunisolide (NASAREL) 25 mcg (0.025 %) spry 2 Sprays two (2) times a day.  albuterol (ProAir HFA) 90 mcg/actuation inhaler Take  by inhalation.  colchicine 0.6 mg tablet Take 0.6 mg by mouth daily.  gabapentin (NEURONTIN) 300 mg capsule Take 300 mg by mouth four (4) times daily.  oxyCODONE IR (ROXICODONE) 5 mg immediate release tablet Take 5 mg by mouth every twelve (12) hours.  metFORMIN (GLUCOPHAGE) 500 mg tablet Take 1,000 mg by mouth two (2) times daily (with meals).       insulin lispro (HumaLOG U-100 Insulin) 100 unit/mL injection by SubCUTAneous route. Sliding scale       No current facility-administered medications on file prior to encounter. REVIEW OF SYSTEMS    Pertinent items are noted in HPI. Objective:     Visit Vitals  BP 95/71 (BP 1 Location: Right lower arm, BP Patient Position: At rest;Sitting)   Pulse 94   Temp 98.2 °F (36.8 °C)   Resp 22       Wt Readings from Last 3 Encounters:   12/20/21 (!) 169 kg (372 lb 9.6 oz)   11/08/21 (!) 168.6 kg (371 lb 12.8 oz)   10/25/21 (!) 168.3 kg (371 lb)       PHYSICAL EXAM  The medial right heel wound is closed, the more posterior open wound is smaller, no evidence of active infection; left leg wounds are unchanged in area, less slough, no active evidence of active infection, slightly improved  Pertinent items are noted in HPI. Assessment:   Some progress  Problem List Items Addressed This Visit     Idiopathic chronic venous hypertension of left lower extremity with ulcer (HCC)    Localized edema    Non-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 ([address]    Ulcer of left foot, with fat layer exposed ([address]    Ulcer of right heel, with fat layer exposed ([address]          Procedure Note  Indications:  Based on my examination of this patient's wound(s)/ulcer(s) today, debridement is not required to promote healing and evaluate the wound base. Wound Leg lower Left;Lateral #1 (Active)   Wound Image   06/08/22 1316   Wound Etiology Venous 06/08/22 1316   Dressing Status Clean;Dry; Intact 06/08/22 1316   Cleansed Soap and water 06/08/22 1316   Wound Length (cm) 11 cm 06/08/22 1316   Wound Width (cm) 8.1 cm 06/08/22 1316   Wound Depth (cm) 0.2 cm 06/08/22 1316   Wound Surface Area (cm^2) 89.1 cm^2 06/08/22 1316   Change in Wound Size % 19 06/08/22 1316   Wound Volume (cm^3) 17.82 cm^3 06/08/22 1316   Wound Healing % -62 06/08/22 1316   Post-Procedure Length (cm) 11.2 cm 06/01/22 1428   Post-Procedure Width (cm) 8.2 cm 06/01/22 1428   Post-Procedure Depth (cm) 0.2 cm 06/01/22 1428   Post-Procedure Surface Area (cm^2) 91.84 cm^2 06/01/22 1428   Post-Procedure Volume (cm^3) 18.368 cm^3 06/01/22 1428   Wound Assessment Slough;Pink/red;Granulation tissue 06/08/22 1316   Drainage Amount Moderate 06/08/22 1316   Drainage Description Serosanguinous 06/08/22 1316   Wound Odor None 06/08/22 1316   Alissa-Wound/Incision Assessment Intact;Dry/flaky 06/08/22 1316   Edges Attached edges 06/08/22 1316   Wound Thickness Description Full thickness 06/08/22 1316   Number of days: 98       Wound Heel Right #2 (Active)   Wound Image   06/08/22 1316   Wound Etiology Venous 06/08/22 1316   Dressing Status Clean;Dry; Intact 06/08/22 1316   Cleansed Soap and water 06/08/22 1316   Wound Length (cm) 0.5 cm 06/08/22 1316   Wound Width (cm) 0.5 cm 06/08/22 1316   Wound Depth (cm) 0.1 cm 06/08/22 1316   Wound Surface Area (cm^2) 0.25 cm^2 06/08/22 1316   Change in Wound Size % 94.28 06/08/22 1316   Wound Volume (cm^3) 0.025 cm^3 06/08/22 1316   Wound Healing % 94 06/08/22 1316   Post-Procedure Length (cm) 1 cm 05/11/22 1457   Post-Procedure Width (cm) 1 cm 05/11/22 1457   Post-Procedure Depth (cm) 0.2 cm 05/11/22 1457   Post-Procedure Surface Area (cm^2) 1 cm^2 05/11/22 1457   Post-Procedure Volume (cm^3) 0.2 cm^3 05/11/22 1457   Wound Assessment Pale granulation tissue;Slough 06/08/22 1316   Drainage Amount Small 06/08/22 1316   Drainage Description Serosanguinous 06/08/22 1316   Wound Odor None 06/08/22 1316   Alissa-Wound/Incision Assessment Intact 06/08/22 1316   Edges Attached edges 06/08/22 1316   Wound Thickness Description Full thickness 06/08/22 1316   Number of days: 77        Plan:   Continue silver cell, drawtex, Profore wrap, leg elevation    Treatment Note please see attached Discharge Instructions    Written patient dismissal instructions given to patient or POA.          Electronically signed by Brandy Stovall MD on 6/8/2022 at 1:36 PM

## 2022-06-08 NOTE — WOUND CARE
Multilayer Compression Wrap   (Not Unna) Below the Knee    NAME:  Ilana Acosta OF BIRTH:  1955  MEDICAL RECORD NUMBER:  [id number]  DATE:  6/8/2022    Removed old Multilayer wrap if indicated and wash leg with mild soap/water. Applied moisturizing agent to dry skin as needed. Applied primary and secondary dressing as ordered. Applied multilayered dressing below the knee to right lower leg. Applied multilayered dressing below the knee to left lower leg. Instructed patient/caregiver not to remove dressing and to keep it clean and dry. Instructed patient/caregiver on complications to report to provider, such as pain, numbness in toes, heavy drainage, and slippage of dressing. Instructed patient on purpose of compression dressing and on activity and exercise recommendations.     Response to treatment: Well tolerated by patient       Electronically signed by Tomi Aragon LPN on 4/7/0801 at 2:46 PM

## 2022-06-08 NOTE — WOUND CARE
Discharge Instructions  [address] [place] [address], [address] [place] [address]  Telephone: [phone]    NAME:  Amelia Manriquez OF BIRTH:  1955  MEDICAL RECORD NUMBER:  [id number]  DATE: 06/08/2022       Return Appointment:  [] Dressing supply provider:   [x] Home Healthcare: [address]    [x] Return Appointment:  1   weeks  [] Nurse Visit:       [] Discharge from [facility]  [] Ordered tests:    [x] Referrals:                         [] Rx:        Wound Cleansing:   Do not scrub or use excessive force. Cleanse wound prior to applying a clean dressing with:  [x] Normal Saline   [x] Mild Soap & Water    [] Keep Wound Dry in Shower  [] Other:      Topical Treatments:  Do not apply lotions, creams, or ointments to wound bed unless directed. [] Apply moisturizing lotion to skin surrounding the wound prior to dressing change.  [] Apply antifungal ointment to skin surrounding the wound prior to dressing change.  [] Apply thin film of moisture barrier ointment to skin immediately around wound. [] Other:  Betadine to israel-wound     Dressings:           Wound Location Left leg and right heel  [x] Apply Primary Dressing:       [] MediHoney Gel    [] MediHoney Alginate               [] Calcium Alginate      [x] Calcium Alginate with silver- Silvercel   [] Collagen                   [] Collagen with Silver                [] Santyl with Moistened saline gauze              [] Hydrofera Blue (cut to size and moistened)  [] Hydrofera Blue Ready (Cut to size)   [] NS wet to dry    [] Betadine wet to dry              [] Hydrogel                [] Mepitel     [] Bactroban/Mupirocin               [] Other:      [x] Cover and Secure with:     [x] Gauze [] Scottie Soda [] Kerlix   [] Foam [x] Super Absorbant [x] ABD     [] ACE Wrap            [] Other:    Avoid contact of tape with skin.     [x] Change dressing: [] Daily    [] Every Other Day [] Once per week   [] Twice per week   [x] Three times per week [] Do Not Change Dressing   [] Other:      Compression:  Apply: [x] Multilayer Compression Wrap: [x]RightLeg [x]Left Leg                                 []Profore  [x] profore lite  ( 3 layer)           []Unna     [x] Do not get leg(s) with wrap wet. If wraps become too tight call the center or completely remove the wrap. [x] Elevate leg(s) above the level of the heart when sitting. [x] Avoid prolonged standing in one place. Dietary:  [x] Diet as tolerated: [] Calorie Diabetic Diet: [x] No Added Salt:  [] Increase Protein: [] Other:     Activity:  [x] Activity as tolerated:    [] Patient has no activity restrictions       [] Strict Bedrest:   [] Remain off Work:       [] May return to full duty work:                                     [] Return to work with restrictions:               [address] Information: Should you experience any significant changes in your wound(s) or have questions about your wound care, please contact Thais Doyle [address] at [place] [id number] 8:00 am - 4:30. If you need help with your wound outside of these hours and cannot wait until we are again available, contact your PCP or go to the hospital emergency room. PLEASE NOTE: IF YOU ARE UNABLE TO OBTAIN WOUND SUPPLIES, CONTINUE TO USE THE SUPPLIES YOU HAVE AVAILABLE UNTIL YOU ARE ABLE TO REACH US. IT IS MOST IMPORTANT TO KEEP THE WOUND COVERED AT ALL TIMES.     [x] Dr. Vernon Palacios   [] Dr. Lali Hoffman  [] [facility]  [] Dr. Malissa Montanez

## 2022-06-08 NOTE — DISCHARGE INSTRUCTIONS
Discharge Instructions  [facility]  Telephone: [phone]    NAME:  Krupa Alfredo OF BIRTH:  1955  MEDICAL RECORD NUMBER:  [id number]  DATE: 06/08/2022       Return Appointment:  [] Dressing supply provider:   [x] Home Healthcare: [address]    [x] Return Appointment:  1   weeks  [] Nurse Visit:       [] Discharge from [facility]  [] Ordered tests:    [x] Referrals:                         [] Rx:        Wound Cleansing:   Do not scrub or use excessive force. Cleanse wound prior to applying a clean dressing with:  [x] Normal Saline   [x] Mild Soap & Water    [] Keep Wound Dry in Shower  [] Other:      Topical Treatments:  Do not apply lotions, creams, or ointments to wound bed unless directed. [] Apply moisturizing lotion to skin surrounding the wound prior to dressing change.  [] Apply antifungal ointment to skin surrounding the wound prior to dressing change.  [] Apply thin film of moisture barrier ointment to skin immediately around wound. [] Other:  Betadine to israel-wound     Dressings:           Wound Location Left leg and right heel  [x] Apply Primary Dressing:       [] MediHoney Gel    [] MediHoney Alginate               [] Calcium Alginate      [x] Calcium Alginate with silver- Silvercel   [] Collagen                   [] Collagen with Silver                [] Santyl with Moistened saline gauze              [] Hydrofera Blue (cut to size and moistened)  [] Hydrofera Blue Ready (Cut to size)   [] NS wet to dry    [] Betadine wet to dry              [] Hydrogel                [] Mepitel     [] Bactroban/Mupirocin               [] Other:      [x] Cover and Secure with:     [x] Gauze [] Digna Cue [] Kerlix   [] Foam [x] Super Absorbant [x] ABD     [] ACE Wrap            [] Other:    Avoid contact of tape with skin.     [x] Change dressing: [] Daily    [] Every Other Day [] Once per week   [] Twice per week   [x] Three times per week [] Do Not Change Dressing   [] Other:      Compression:  Apply: [x] Multilayer Compression Wrap: [x]RightLeg [x]Left Leg                                 []Profore  [x] profore lite  ( 3 layer)           []Unna     [x] Do not get leg(s) with wrap wet. If wraps become too tight call the center or completely remove the wrap. [x] Elevate leg(s) above the level of the heart when sitting. [x] Avoid prolonged standing in one place. Dietary:  [x] Diet as tolerated: [] Calorie Diabetic Diet: [x] No Added Salt:  [] Increase Protein: [] Other:     Activity:  [x] Activity as tolerated:    [] Patient has no activity restrictions       [] Strict Bedrest:   [] Remain off Work:       [] May return to full duty work:                                     [] Return to work with restrictions:               [address] [facility] [address] [address] Information: Should you experience any significant changes in your wound(s) or have questions about your wound care, please contact Thais Doyle 26 at [facility] [address] 8:00 am - 4:30. If you need help with your wound outside of these hours and cannot wait until we are again available, contact your PCP or go to the hospital emergency room. PLEASE NOTE: IF YOU ARE UNABLE TO OBTAIN WOUND SUPPLIES, CONTINUE TO USE THE SUPPLIES YOU HAVE AVAILABLE UNTIL YOU ARE ABLE TO REACH US. IT IS MOST IMPORTANT TO KEEP THE WOUND COVERED AT ALL TIMES.     [x] Dr. Rosanna Irizarry   [] Dr. Jani Downs  [] Deanna angela [facility]  [] Dr. Gerald Duong

## 2022-06-15 ENCOUNTER — HOSPITAL ENCOUNTER (OUTPATIENT)
Dept: WOUND CARE | Age: 67
Discharge: HOME | End: 2022-06-15
Payer: MEDICARE

## 2022-06-15 VITALS
HEART RATE: 96 BPM | RESPIRATION RATE: 22 BRPM | SYSTOLIC BLOOD PRESSURE: 93 MMHG | TEMPERATURE: 98.4 F | DIASTOLIC BLOOD PRESSURE: 57 MMHG

## 2022-06-15 DIAGNOSIS — L97.412 ULCER OF RIGHT HEEL, WITH FAT LAYER EXPOSED (HCC): ICD-10-CM

## 2022-06-15 DIAGNOSIS — I87.312 IDIOPATHIC CHRONIC VENOUS HYPERTENSION OF LEFT LOWER EXTREMITY WITH ULCER (HCC): ICD-10-CM

## 2022-06-15 DIAGNOSIS — L97.929 IDIOPATHIC CHRONIC VENOUS HYPERTENSION OF LEFT LOWER EXTREMITY WITH ULCER (HCC): ICD-10-CM

## 2022-06-15 DIAGNOSIS — L97.822 NON-PRESSURE CHRONIC ULCER OF OTHER PART OF LEFT LOWER LEG WITH FAT LAYER EXPOSED (HCC): ICD-10-CM

## 2022-06-15 DIAGNOSIS — L97.522 ULCER OF LEFT FOOT, WITH FAT LAYER EXPOSED (HCC): ICD-10-CM

## 2022-06-15 DIAGNOSIS — R60.0 LOCALIZED EDEMA: ICD-10-CM

## 2022-06-15 PROBLEM — L97.812 NON-PRESSURE CHRONIC ULCER OF OTHER PART OF RIGHT LOWER LEG WITH FAT LAYER EXPOSED (HCC): Status: ACTIVE | Noted: 2022-06-15

## 2022-06-15 PROCEDURE — 29581 APPL MULTLAYER CMPRN SYS LEG: CPT

## 2022-06-15 PROCEDURE — 74011000250 HC RX REV CODE- 250: Performed by: SPECIALIST

## 2022-06-15 RX ORDER — LIDOCAINE HYDROCHLORIDE 20 MG/ML
15 SOLUTION OROPHARYNGEAL AS NEEDED
Status: DISCONTINUED | OUTPATIENT
Start: 2022-06-15 | End: 2022-06-17 | Stop reason: HOSPADM

## 2022-06-15 NOTE — DISCHARGE INSTRUCTIONS
Discharge Instructions  [address] [place] [address], [address] [place] [address]  Telephone: [phone]    NAME:  Allison Wilkins OF BIRTH:  1955  MEDICAL RECORD NUMBER:  [id number]  DATE: 06/15/2022       Return Appointment:  [] Dressing supply provider:   [x] Home Healthcare: [address]    [x] Return Appointment:  1   weeks  [] Nurse Visit:       [] Discharge from [facility]  [] Ordered tests:    [x] Referrals:                         [] Rx:        Wound Cleansing:   Do not scrub or use excessive force. Cleanse wound prior to applying a clean dressing with:  [x] Normal Saline   [x] Mild Soap & Water    [] Keep Wound Dry in Shower  [] Other:      Topical Treatments:  Do not apply lotions, creams, or ointments to wound bed unless directed. [] Apply moisturizing lotion to skin surrounding the wound prior to dressing change.  [] Apply antifungal ointment to skin surrounding the wound prior to dressing change.  [] Apply thin film of moisture barrier ointment to skin immediately around wound. [] Other:  Betadine to israel-wound     Dressings:           Wound Location Left leg and right heel  [x] Apply Primary Dressing:       [] MediHoney Gel    [] MediHoney Alginate               [] Calcium Alginate      [x] Calcium Alginate with silver- Silvercel   [] Collagen                   [] Collagen with Silver                [] Santyl with Moistened saline gauze              [] Hydrofera Blue (cut to size and moistened)  [] Hydrofera Blue Ready (Cut to size)   [] NS wet to dry    [] Betadine wet to dry              [] Hydrogel                [] Mepitel     [] Bactroban/Mupirocin               [] Other:      [x] Cover and Secure with:     [x] Gauze [] Graciela Plank [] Kerlix   [] Foam [x] Super Absorbant [x] ABD     [] ACE Wrap            [] Other:    Avoid contact of tape with skin.     [x] Change dressing: [] Daily    [] Every Other Day [] Once per week   [] Twice per week   [x] Three times per week [] Do Not Change Dressing   [] Other:      Compression:  Apply: [x] Multilayer Compression Wrap: [x]RightLeg [x]Left Leg                                 []Profore  [x] profore lite  ( 3 layer)           []Unna     [x] Do not get leg(s) with wrap wet. If wraps become too tight call the center or completely remove the wrap. [x] Elevate leg(s) above the level of the heart when sitting. [x] Avoid prolonged standing in one place. Dietary:  [x] Diet as tolerated: [] Calorie Diabetic Diet: [x] No Added Salt:  [] Increase Protein: [] Other:     Activity:  [x] Activity as tolerated:    [] Patient has no activity restrictions       [] Strict Bedrest:   [] Remain off Work:       [] May return to full duty work:                                     [] Return to work with restrictions:               [address] Information: Should you experience any significant changes in your wound(s) or have questions about your wound care, please contact Thais Doyle 26 at [place] [address] 8:00 am - 4:30. If you need help with your wound outside of these hours and cannot wait until we are again available, contact your PCP or go to the hospital emergency room. PLEASE NOTE: IF YOU ARE UNABLE TO OBTAIN WOUND SUPPLIES, CONTINUE TO USE THE SUPPLIES YOU HAVE AVAILABLE UNTIL YOU ARE ABLE TO REACH US. IT IS MOST IMPORTANT TO KEEP THE WOUND COVERED AT ALL TIMES.     [x] Dr. Moises Zarate   [] Dr. Elise Isaac  [] [facility]  [] Dr. Ervin Ruvalcaba

## 2022-06-15 NOTE — WOUND CARE
Discharge Instructions  7 [facility], [address] [place] [address]  Telephone: [phone]    NAME:  Abbi Sahni OF BIRTH:  1955  MEDICAL RECORD NUMBER:  [id number]  DATE: 06/15/2022       Return Appointment:  [] Dressing supply provider:   [x] Home Healthcare: [address]    [x] Return Appointment:  1   weeks  [] Nurse Visit:       [] Discharge from [facility]  [] Ordered tests:    [x] Referrals:                         [] Rx:        Wound Cleansing:   Do not scrub or use excessive force. Cleanse wound prior to applying a clean dressing with:  [x] Normal Saline   [x] Mild Soap & Water    [] Keep Wound Dry in Shower  [] Other:      Topical Treatments:  Do not apply lotions, creams, or ointments to wound bed unless directed. [] Apply moisturizing lotion to skin surrounding the wound prior to dressing change.  [] Apply antifungal ointment to skin surrounding the wound prior to dressing change.  [] Apply thin film of moisture barrier ointment to skin immediately around wound. [] Other:  Betadine to israel-wound     Dressings:           Wound Location Left leg and right heel  [x] Apply Primary Dressing:       [] MediHoney Gel    [] MediHoney Alginate               [] Calcium Alginate      [x] Calcium Alginate with silver- Silvercel   [] Collagen                   [] Collagen with Silver                [] Santyl with Moistened saline gauze              [] Hydrofera Blue (cut to size and moistened)  [] Hydrofera Blue Ready (Cut to size)   [] NS wet to dry    [] Betadine wet to dry              [] Hydrogel                [] Mepitel     [] Bactroban/Mupirocin               [] Other:      [x] Cover and Secure with:     [x] Gauze [] Michael Kai [] Kerlix   [] Foam [x] Super Absorbant [x] ABD     [] ACE Wrap            [] Other:    Avoid contact of tape with skin.     [x] Change dressing: [] Daily    [] Every Other Day [] Once per week   [] Twice per week   [x] Three times per week [] Do Not Change Dressing   [] Other:      Compression:  Apply: [x] Multilayer Compression Wrap: [x]RightLeg [x]Left Leg                                 []Profore  [x] profore lite  ( 3 layer)           []Unna     [x] Do not get leg(s) with wrap wet. If wraps become too tight call the center or completely remove the wrap. [x] Elevate leg(s) above the level of the heart when sitting. [x] Avoid prolonged standing in one place. Dietary:  [x] Diet as tolerated: [] Calorie Diabetic Diet: [x] No Added Salt:  [] Increase Protein: [] Other:     Activity:  [x] Activity as tolerated:    [] Patient has no activity restrictions       [] Strict Bedrest:   [] Remain off Work:       [] May return to full duty work:                                     [] Return to work with restrictions:               [address] [facility] [address] Information: Should you experience any significant changes in your wound(s) or have questions about your wound care, please contact Thais Doyle 26 at [place] [address] 8:00 am - 4:30. If you need help with your wound outside of these hours and cannot wait until we are again available, contact your PCP or go to the hospital emergency room. PLEASE NOTE: IF YOU ARE UNABLE TO OBTAIN WOUND SUPPLIES, CONTINUE TO USE THE SUPPLIES YOU HAVE AVAILABLE UNTIL YOU ARE ABLE TO REACH US. IT IS MOST IMPORTANT TO KEEP THE WOUND COVERED AT ALL TIMES.     [x] Dr. Sameera Zelaya   [] Dr. Fly Mariee  [] Jacqui Puri [facility]  [] Dr. Nate Mendoza

## 2022-06-15 NOTE — WOUND CARE
Multilayer Compression Wrap   (Not Unna) Below the Knee    NAME:  Moon Isaac OF BIRTH:  1955  MEDICAL RECORD NUMBER:  [id number]  DATE:  6/15/2022    Removed old Multilayer wrap if indicated and wash leg with mild soap/water. Applied primary and secondary dressing as ordered. Applied multilayered dressing below the knee to right lower leg. Applied multilayered dressing below the knee to left lower leg. Instructed patient/caregiver not to remove dressing and to keep it clean and dry. Instructed patient/caregiver on complications to report to provider, such as pain, numbness in toes, heavy drainage, and slippage of dressing. Instructed patient on purpose of compression dressing and on activity and exercise recommendations.     Response to treatment: Well tolerated by patient       Electronically signed by Patrice Cotton RN on 6/15/2022 at 2:11 PM

## 2022-06-15 NOTE — WOUND CARE
[address]   Progress Note and Procedure Note   NO Procedure      [address] RECORD NUMBER:  [id number]  AGE: 78 y.o. RACE WHITE/NON-  GENDER: male  : 1955  EPISODE DATE:  6/15/2022    Subjective:   Reports that his blood sugars have been more difficult to control, generally around 300 recently. He has a new wound on his right leg by history. Chief Complaint   Patient presents with    Wound Check     left leg and right heel         HISTORY of PRESENT ILLNESS HPI    Isabella Gil is a 78 y.o. male who presents today for wound/ulcer evaluation.    History of Wound Context: BLE  Wound/Ulcer Pain Timing/Severity: none  Quality of pain: dull  Severity:  0 / 10   Modifying Factors: None  Associated Signs/Symptoms: edema    Ulcer Identification:  Ulcer Type: venous    Contributing Factors: lymphedema    Wound: BLE         PAST MEDICAL HISTORY    Past Medical History:   Diagnosis Date    Arthritis     CAD (coronary artery disease)     Chronic obstructive pulmonary disease (HCC)     Diabetes ([address]     Gout     Hypertension     Ill-defined condition     Sleep apnea         PAST SURGICAL HISTORY    Past Surgical History:   Procedure Laterality Date    HX ORTHOPAEDIC      HX PACEMAKER      HX VEIN ABLATION ADHESIVE         FAMILY HISTORY    Family History   Problem Relation Age of Onset    Heart Disease Mother     Kidney Disease Mother     Hypertension Mother     Diabetes Mother     Heart Disease Father     Hypertension Father     Diabetes Sister     Diabetes Brother        SOCIAL HISTORY    Social History     Tobacco Use    Smoking status: Never Smoker    Smokeless tobacco: Never Used   Vaping Use    Vaping Use: Never used   Substance Use Topics    Alcohol use: Not Currently    Drug use: Never       ALLERGIES    Allergies   Allergen Reactions    Elavil Angioedema    Naproxen Unknown (comments)     Pt not sure of reaction    Sulfa (Sulfonamide Antibiotics) Nausea and Vomiting       MEDICATIONS    Current Outpatient Medications on File Prior to Encounter   Medication Sig Dispense Refill    insulin glargine,hum.rec.anlog (TOUJEO MAX U-300 SOLOSTAR SC) 80 Units by SubCUTAneous route two (2) times a day. 80 units in am, 34 units in pm      b complex vitamins tablet Take 1 Tablet by mouth daily.  Entresto 24-26 mg tablet Take 2 Tablets by mouth.  cloNIDine HCL (CATAPRES) 0.1 mg tablet Take 1 Tablet by mouth every twelve (12) hours.  ammonium lactate (AMLACTIN) 12 % topical cream Apply  to affected area as needed for Other (xerosis). rub in to affected area well  Indications: dry skin 280 g 0    vitamin E, dl,tocopheryl acet, (vitamin E acetate) 400 unit capsule Take 2 Capsules by mouth two (2) times a day. 100 Capsule 0    ascorbic acid, vitamin C, (Vitamin C) 500 mg tablet Take 1,000 mg by mouth two (2) times a day. Indications: inadequate vitamin C      tamsulosin (Flomax) 0.4 mg capsule Take 0.4 mg by mouth daily.  tiotropium (Spiriva with HandiHaler) 18 mcg inhalation capsule Take 1 Capsule by inhalation daily.  bumetanide (BUMEX) 2 mg tablet Take 2 mg by mouth two (2) times a day.  ferrous sulfate (Iron) 325 mg (65 mg iron) tablet Take  by mouth Daily (before breakfast).  aspirin 81 mg chewable tablet Take 81 mg by mouth daily.  flunisolide (NASAREL) 25 mcg (0.025 %) spry 2 Sprays two (2) times a day.  albuterol (ProAir HFA) 90 mcg/actuation inhaler Take  by inhalation.  colchicine 0.6 mg tablet Take 0.6 mg by mouth daily.  gabapentin (NEURONTIN) 300 mg capsule Take 300 mg by mouth four (4) times daily.  oxyCODONE IR (ROXICODONE) 5 mg immediate release tablet Take 5 mg by mouth every twelve (12) hours.  metFORMIN (GLUCOPHAGE) 500 mg tablet Take 1,000 mg by mouth two (2) times daily (with meals).       insulin lispro (HumaLOG U-100 Insulin) 100 unit/mL injection by SubCUTAneous route. Sliding scale       No current facility-administered medications on file prior to encounter. REVIEW OF SYSTEMS    Pertinent items are noted in HPI. Objective:     Visit Vitals  BP (!) 93/57 (BP 1 Location: Right arm, BP Patient Position: At rest;Sitting)   Pulse 96   Temp 98.4 °F (36.9 °C)   Resp 22       Wt Readings from Last 3 Encounters:   12/20/21 (!) 169 kg (372 lb 9.6 oz)   11/08/21 (!) 168.6 kg (371 lb 12.8 oz)   10/25/21 (!) 168.3 kg (371 lb)       PHYSICAL EXAM  The left posterior lateral calf wounds are not significantly changed in area, mild slough, no evidence of active infection  On the right leg, the right medial heel wound is slightly larger. There are no new wounds on the right lateral lower extremity, with no evidence of active infection  Pertinent items are noted in HPI. Assessment:   No improvement, new wounds in the right leg, poorly controlled blood sugar  Problem List Items Addressed This Visit     Idiopathic chronic venous hypertension of left lower extremity with ulcer (HCC)    Localized edema    Non-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 ([address]    Ulcer of left foot, with fat layer exposed ([address]    Ulcer of right heel, with fat layer exposed ([address]          Procedure Note  Indications:  Based on my examination of this patient's wound(s)/ulcer(s) today, debridement is not required to promote healing and evaluate the wound base. Wound Leg lower Left;Lateral #1 (Active)   Wound Image   06/15/22 1348   Wound Etiology Venous 06/15/22 1348   Dressing Status Clean;Dry; Intact 06/15/22 1348   Cleansed Soap and water 06/15/22 1348   Wound Length (cm) 9.6 cm 06/15/22 1348   Wound Width (cm) 6.5 cm 06/15/22 1348   Wound Depth (cm) 0.1 cm 06/15/22 1348   Wound Surface Area (cm^2) 62.4 cm^2 06/15/22 1348   Change in Wound Size % 43.27 06/15/22 1348   Wound Volume (cm^3) 6.24 cm^3 06/15/22 1348   Wound Healing % 43 06/15/22 1348 Post-Procedure Length (cm) 11.2 cm 06/01/22 1428   Post-Procedure Width (cm) 8.2 cm 06/01/22 1428   Post-Procedure Depth (cm) 0.2 cm 06/01/22 1428   Post-Procedure Surface Area (cm^2) 91.84 cm^2 06/01/22 1428   Post-Procedure Volume (cm^3) 18.368 cm^3 06/01/22 1428   Wound Assessment Slough;Granulation tissue 06/15/22 1348   Drainage Amount Moderate 06/15/22 1348   Drainage Description Serosanguinous; Yellow 06/15/22 1348   Wound Odor None 06/15/22 1348   Alissa-Wound/Incision Assessment Intact;Dry/flaky 06/15/22 1348   Edges Attached edges 06/15/22 1348   Wound Thickness Description Full thickness 06/15/22 1348   Number of days: 105       Wound Heel Right #2 (Active)   Wound Image   06/15/22 1348   Wound Etiology Venous 06/15/22 1348   Dressing Status Clean;Dry; Intact 06/15/22 1348   Cleansed Soap and water 06/15/22 1348   Wound Length (cm) 1 cm 06/15/22 1348   Wound Width (cm) 0.7 cm 06/15/22 1348   Wound Depth (cm) 0.2 cm 06/15/22 1348   Wound Surface Area (cm^2) 0.7 cm^2 06/15/22 1348   Change in Wound Size % 83.98 06/15/22 1348   Wound Volume (cm^3) 0.14 cm^3 06/15/22 1348   Wound Healing % 68 06/15/22 1348   Post-Procedure Length (cm) 1 cm 05/11/22 1457   Post-Procedure Width (cm) 1 cm 05/11/22 1457   Post-Procedure Depth (cm) 0.2 cm 05/11/22 1457   Post-Procedure Surface Area (cm^2) 1 cm^2 05/11/22 1457   Post-Procedure Volume (cm^3) 0.2 cm^3 05/11/22 1457   Wound Assessment Granulation tissue;Slough 06/15/22 1348   Drainage Amount Small 06/15/22 1348   Drainage Description Serosanguinous 06/15/22 1348   Wound Odor None 06/15/22 1348   Alissa-Wound/Incision Assessment Intact 06/15/22 1348   Edges Attached edges 06/15/22 1348   Wound Thickness Description Full thickness 06/15/22 1348   Number of days: 84       Wound Leg Right #3 06/15/22 (Active)   Wound Image   06/15/22 1351   Wound Etiology Venous 06/15/22 1351   Dressing Status Clean;Dry; Intact 06/15/22 1351   Cleansed Soap and water 06/15/22 1351   Wound Length (cm) 8.5 cm 06/15/22 1351   Wound Width (cm) 6.7 cm 06/15/22 1351   Wound Depth (cm) 0.1 cm 06/15/22 1351   Wound Surface Area (cm^2) 56.95 cm^2 06/15/22 1351   Wound Volume (cm^3) 5.695 cm^3 06/15/22 1351   Wound Assessment Granulation tissue 06/15/22 1351   Drainage Amount Small 06/15/22 1351   Drainage Description Serosanguinous 06/15/22 1351   Wound Odor None 06/15/22 1351   Alissa-Wound/Incision Assessment Intact 06/15/22 1351   Edges Attached edges 06/15/22 1351   Wound Thickness Description Full thickness 06/15/22 1351   Number of days: 0        Plan:   Continue silver cell with 3 layer compression wrap  Better glucose management    Treatment Note please see attached Discharge Instructions    Written patient dismissal instructions given to patient or POA.          Electronically signed by Tiffany Reyes MD on 6/15/2022 at 2:04 PM

## 2022-07-06 ENCOUNTER — HOSPITAL ENCOUNTER (OUTPATIENT)
Dept: WOUND CARE | Age: 67
Discharge: HOME | End: 2022-07-06
Payer: MEDICARE

## 2022-07-06 VITALS
TEMPERATURE: 97.6 F | DIASTOLIC BLOOD PRESSURE: 89 MMHG | SYSTOLIC BLOOD PRESSURE: 135 MMHG | OXYGEN SATURATION: 95 % | HEART RATE: 86 BPM | RESPIRATION RATE: 22 BRPM

## 2022-07-06 DIAGNOSIS — L97.812 NON-PRESSURE CHRONIC ULCER OF OTHER PART OF RIGHT LOWER LEG WITH FAT LAYER EXPOSED (HCC): ICD-10-CM

## 2022-07-06 DIAGNOSIS — L97.929 IDIOPATHIC CHRONIC VENOUS HYPERTENSION OF LEFT LOWER EXTREMITY WITH ULCER (HCC): ICD-10-CM

## 2022-07-06 DIAGNOSIS — L97.412 ULCER OF RIGHT HEEL, WITH FAT LAYER EXPOSED (HCC): ICD-10-CM

## 2022-07-06 DIAGNOSIS — I87.312 IDIOPATHIC CHRONIC VENOUS HYPERTENSION OF LEFT LOWER EXTREMITY WITH ULCER (HCC): ICD-10-CM

## 2022-07-06 DIAGNOSIS — L97.522 ULCER OF LEFT FOOT, WITH FAT LAYER EXPOSED (HCC): ICD-10-CM

## 2022-07-06 DIAGNOSIS — L97.822 NON-PRESSURE CHRONIC ULCER OF OTHER PART OF LEFT LOWER LEG WITH FAT LAYER EXPOSED (HCC): ICD-10-CM

## 2022-07-06 DIAGNOSIS — R60.0 LOCALIZED EDEMA: ICD-10-CM

## 2022-07-06 PROCEDURE — 29581 APPL MULTLAYER CMPRN SYS LEG: CPT

## 2022-07-06 PROCEDURE — 74011000250 HC RX REV CODE- 250: Performed by: SPECIALIST

## 2022-07-06 RX ORDER — LIDOCAINE HYDROCHLORIDE 20 MG/ML
15 SOLUTION OROPHARYNGEAL AS NEEDED
Status: DISCONTINUED | OUTPATIENT
Start: 2022-07-06 | End: 2022-07-08 | Stop reason: HOSPADM

## 2022-07-06 NOTE — DISCHARGE INSTRUCTIONS
Discharge Instructions  [address] [place] [address], [address] [place] [address]  Telephone: [phone]    NAME:  Petr Labor OF BIRTH:  1955  MEDICAL RECORD NUMBER:  [id number]  DATE: 7/6/2022       Return Appointment:  [] Dressing supply provider:   [x] Home Healthcare: [address] [facility]  [x] Return Appointment: 1 Week(s)  [] Nurse Visit:       [] Discharge from [facility]  [] Ordered tests:    [] Referrals:                         [] Rx:        Wound Cleansing:   Do not scrub or use excessive force. Cleanse wound prior to applying a clean dressing with:  [] Normal Saline   [x] Mild Soap & Water/Baby Shampoo    [] Keep Wound Dry in Shower  [] Other:      Topical Treatments:  Do not apply lotions, creams, or ointments to wound bed unless directed. [x] Apply moisturizing lotion to skin surrounding the wound prior to dressing change.  [] Apply antifungal ointment to skin surrounding the wound prior to dressing change.  [] Apply thin film of moisture barrier ointment to skin immediately around wound. [] Other:  Betadine to israel-wound     Dressings:           Wound Location L leg, R leg, R heel  [x] Apply Primary Dressing:       [] MediHoney Gel    [] MediHoney Alginate               [] Calcium Alginate      [x] Calcium Alginate with silver- Silvercel   [] Collagen                   [] Collagen with Silver                [] Santyl with Moistened saline gauze              [] Hydrofera Blue (cut to size and moistened)  [] Hydrofera Blue Ready (Cut to size)   [] NS wet to dry    [] Betadine wet to dry              [] Hydrogel                [] Mepitel     [] Bactroban/Mupirocin               [] Other:      [x] Cover and Secure with:     [x] Gauze [] Nav Lab [] Kerlix   [] Foam [] Super Absorbant [x] ABD     [] ACE Wrap            [] Other:    Avoid contact of tape with skin.     [x] Change dressing: [] Daily    [] Every Other Day [] Once per week [] Twice per week   [x] Three times per week [] Do Not Change Dressing   [] Other:      Compression:  Apply: [x] Multilayer Compression Wrap: [x]RightLeg [x]Left Leg                                 []Profore   [x] profore lite          []Unna     [x] Do not get leg(s) with wrap wet. If wraps become too tight call the center or completely remove the wrap. [x] Elevate leg(s) above the level of the heart when sitting. [x] Avoid prolonged standing in one place. Dietary:  [] Diet as tolerated: [x] Calorie Diabetic Diet: [x] No Added Salt:  [x] Increase Protein: [] Other:     Activity:  [x] Activity as tolerated:    [] Patient has no activity restrictions       [] Strict Bedrest:   [] Remain off Work:       [] May return to full duty work:                                     [] Return to work with restrictions:               [facility] Information: Should you experience any significant changes in your wound(s) or have questions about your wound care, please contact [address] at [place] [phone] 8:00 am - 4:30. If you need help with your wound outside of these hours and cannot wait until we are again available, contact your PCP or go to the hospital emergency room. PLEASE NOTE: IF YOU ARE UNABLE TO OBTAIN WOUND SUPPLIES, CONTINUE TO USE THE SUPPLIES YOU HAVE AVAILABLE UNTIL YOU ARE ABLE TO REACH US. IT IS MOST IMPORTANT TO KEEP THE WOUND COVERED AT ALL TIMES.     [x] Dr. Rashmi Foreman   [] Dr. Avel Crane  [] Dr. Yogi Scott

## 2022-07-06 NOTE — WOUND CARE
Discharge Instructions  [address] [place] [address], [address] [place] [address]  Telephone: [phone]    NAME:  Arturo Erps OF BIRTH:  1955  MEDICAL RECORD NUMBER:  [id number]  DATE: 7/6/2022       Return Appointment:  [] Dressing supply provider:   [x] Home Healthcare: [address] [facility]  [x] Return Appointment: 1 Week(s)  [] Nurse Visit:       [] Discharge from [facility]  [] Ordered tests:    [] Referrals:                         [] Rx:        Wound Cleansing:   Do not scrub or use excessive force. Cleanse wound prior to applying a clean dressing with:  [] Normal Saline   [x] Mild Soap & Water/Baby Shampoo    [] Keep Wound Dry in Shower  [] Other:      Topical Treatments:  Do not apply lotions, creams, or ointments to wound bed unless directed. [x] Apply moisturizing lotion to skin surrounding the wound prior to dressing change.  [] Apply antifungal ointment to skin surrounding the wound prior to dressing change.  [] Apply thin film of moisture barrier ointment to skin immediately around wound. [] Other:  Betadine to israel-wound     Dressings:           Wound Location L leg, R leg, R heel  [x] Apply Primary Dressing:       [] MediHoney Gel    [] MediHoney Alginate               [] Calcium Alginate      [x] Calcium Alginate with silver- Silvercel   [] Collagen                   [] Collagen with Silver                [] Santyl with Moistened saline gauze              [] Hydrofera Blue (cut to size and moistened)  [] Hydrofera Blue Ready (Cut to size)   [] NS wet to dry    [] Betadine wet to dry              [] Hydrogel                [] Mepitel     [] Bactroban/Mupirocin               [] Other:      [x] Cover and Secure with:     [x] Gauze [] Eleanora Spell [] Kerlix   [] Foam [] Super Absorbant [x] ABD     [] ACE Wrap            [] Other:    Avoid contact of tape with skin.     [x] Change dressing: [] Daily    [] Every Other Day [] Once per week [] Twice per week   [x] Three times per week [] Do Not Change Dressing   [] Other:      Compression:  Apply: [x] Multilayer Compression Wrap: [x]RightLeg [x]Left Leg                                 []Profore   [x] profore lite          []Unna     [x] Do not get leg(s) with wrap wet. If wraps become too tight call the center or completely remove the wrap. [x] Elevate leg(s) above the level of the heart when sitting. [x] Avoid prolonged standing in one place. Dietary:  [] Diet as tolerated: [x] Calorie Diabetic Diet: [x] No Added Salt:  [x] Increase Protein: [] Other:     Activity:  [x] Activity as tolerated:    [] Patient has no activity restrictions       [] Strict Bedrest:   [] Remain off Work:       [] May return to full duty work:                                     [] Return to work with restrictions:               [facility] Information: Should you experience any significant changes in your wound(s) or have questions about your wound care, please contact Thais [address] at [place] [phone] 8:00 am - 4:30. If you need help with your wound outside of these hours and cannot wait until we are again available, contact your PCP or go to the hospital emergency room. PLEASE NOTE: IF YOU ARE UNABLE TO OBTAIN WOUND SUPPLIES, CONTINUE TO USE THE SUPPLIES YOU HAVE AVAILABLE UNTIL YOU ARE ABLE TO REACH US. IT IS MOST IMPORTANT TO KEEP THE WOUND COVERED AT ALL TIMES.     [x] Dr. Brittni Waters   [] Dr. Bard James  [] Dr. Yong Sanches

## 2022-07-06 NOTE — WOUND CARE
Multilayer Compression Wrap   (Not Unna) Below the Knee    NAME:  Elena Paul OF BIRTH:  1955  MEDICAL RECORD NUMBER:  [id number]  DATE:  7/6/2022    Removed old Multilayer wrap if indicated and wash leg with mild soap/water. Applied primary and secondary dressing as ordered. Applied multilayered dressing below the knee to right lower leg. Applied multilayered dressing below the knee to left lower leg. Instructed patient/caregiver not to remove dressing and to keep it clean and dry. Instructed patient/caregiver on complications to report to provider, such as pain, numbness in toes, heavy drainage, and slippage of dressing. Instructed patient on purpose of compression dressing and on activity and exercise recommendations.     Response to treatment: Well tolerated by patient

## 2022-07-06 NOTE — WOUND CARE
[address]   Progress Note and Procedure Note   NO Procedure      [address] RECORD NUMBER:  [id number]  AGE: 78 y.o. RACE WHITE/NON-  GENDER: male  : 1955  EPISODE DATE:  2022    Subjective:   Patient was recently hospitalized at [facility] for treatment of fluid overload. He had a transient deterioration of renal function which was reversed. He received IV antibiotics and was discharged on oxacillin oral antibiotic for his leg wounds. He has had his diabetic medications adjusted, and now has much better blood glucose control. Chief Complaint   Patient presents with    Wound Check     bilateral legs and right heel         HISTORY of PRESENT ILLNESS HPI    Nga Mcclure is a 78 y.o. male who presents today for wound/ulcer evaluation.    History of Wound Context: BLE  Wound/Ulcer Pain Timing/Severity: mild  Quality of pain: aching  Severity:  2 / 10   Modifying Factors: None  Associated Signs/Symptoms: edema    Ulcer Identification:  Ulcer Type: venous    Contributing Factors: lymphedema    Wound: BLE         PAST MEDICAL HISTORY    Past Medical History:   Diagnosis Date    Arthritis     CAD (coronary artery disease)     Chronic obstructive pulmonary disease (HCC)     Diabetes ([facility] [address]     Gout     Hypertension     Ill-defined condition     Sleep apnea         PAST SURGICAL HISTORY    Past Surgical History:   Procedure Laterality Date    HX ORTHOPAEDIC      HX PACEMAKER      HX VEIN ABLATION ADHESIVE         FAMILY HISTORY    Family History   Problem Relation Age of Onset    Heart Disease Mother     Kidney Disease Mother     Hypertension Mother     Diabetes Mother     Heart Disease Father     Hypertension Father     Diabetes Sister     Diabetes Brother        SOCIAL HISTORY    Social History     Tobacco Use    Smoking status: Never Smoker    Smokeless tobacco: Never Used   Vaping Use    Vaping Use: Never used   Substance Use Topics    Alcohol use: Not Currently    Drug use: Never       ALLERGIES    Allergies   Allergen Reactions    Elavil Angioedema    Naproxen Unknown (comments)     Pt not sure of reaction    Sulfa (Sulfonamide Antibiotics) Nausea and Vomiting       MEDICATIONS    Current Outpatient Medications on File Prior to Encounter   Medication Sig Dispense Refill    insulin glargine,hum.rec.anlog (TOUJEO MAX U-300 SOLOSTAR SC) 80 Units by SubCUTAneous route two (2) times a day. 80 units in am, 34 units in pm      b complex vitamins tablet Take 1 Tablet by mouth daily.  cloNIDine HCL (CATAPRES) 0.1 mg tablet Take 1 Tablet by mouth every twelve (12) hours.  ammonium lactate (AMLACTIN) 12 % topical cream Apply  to affected area as needed for Other (xerosis). rub in to affected area well  Indications: dry skin 280 g 0    vitamin E, dl,tocopheryl acet, (vitamin E acetate) 400 unit capsule Take 2 Capsules by mouth two (2) times a day. 100 Capsule 0    ascorbic acid, vitamin C, (Vitamin C) 500 mg tablet Take 1,000 mg by mouth two (2) times a day. Indications: inadequate vitamin C      tamsulosin (Flomax) 0.4 mg capsule Take 0.4 mg by mouth daily.  tiotropium (Spiriva with HandiHaler) 18 mcg inhalation capsule Take 1 Capsule by inhalation daily.  bumetanide (BUMEX) 2 mg tablet Take 2 mg by mouth two (2) times a day.  ferrous sulfate (Iron) 325 mg (65 mg iron) tablet Take  by mouth Daily (before breakfast).  aspirin 81 mg chewable tablet Take 81 mg by mouth daily.  flunisolide (NASAREL) 25 mcg (0.025 %) spry 2 Sprays two (2) times a day.  albuterol (ProAir HFA) 90 mcg/actuation inhaler Take  by inhalation.  colchicine 0.6 mg tablet Take 0.6 mg by mouth daily.  gabapentin (NEURONTIN) 300 mg capsule Take 300 mg by mouth four (4) times daily.  oxyCODONE IR (ROXICODONE) 5 mg immediate release tablet Take 5 mg by mouth every twelve (12) hours.       insulin lispro (HumaLOG U-100 Insulin) 100 unit/mL injection by SubCUTAneous route. Sliding scale       No current facility-administered medications on file prior to encounter. REVIEW OF SYSTEMS    Pertinent items are noted in HPI. Objective:     Visit Vitals  /89 (BP 1 Location: Right arm, BP Patient Position: At rest;Sitting)   Pulse 86   Temp 97.6 °F (36.4 °C)   Resp 22   SpO2 95%       Wt Readings from Last 3 Encounters:   12/20/21 (!) 169 kg (372 lb 9.6 oz)   11/08/21 (!) 168.6 kg (371 lb 12.8 oz)   10/25/21 (!) 168.3 kg (371 lb)       PHYSICAL EXAM  The right medial heel wound is small, right leg has several small superficial wound  The left leg wound clean appearing but no smaller area, no evidence of infection  Pertinent items are noted in HPI. Assessment:   Slight improvement, better fluid management and better glucose control  Problem List Items Addressed This Visit     Idiopathic chronic venous hypertension of left lower extremity with ulcer (HCC)    Localized edema    Non-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 ([address]    Ulcer of left foot, with fat layer exposed ([address]    Ulcer of right heel, with fat layer exposed ([address]    Non-pressure chronic ulcer of other part of right lower leg with fat layer exposed ([address]          Procedure Note  Indications:  Based on my examination of this patient's wound(s)/ulcer(s) today, debridement is not required to promote healing and evaluate the wound base. Wound Leg lower Left;Lateral #1 (Active)   Wound Image   07/06/22 1410   Wound Etiology Venous 07/06/22 1410   Dressing Status Clean;Dry; Intact 07/06/22 1410   Cleansed Soap and water 07/06/22 1410   Wound Length (cm) 10.1 cm 07/06/22 1410   Wound Width (cm) 7.3 cm 07/06/22 1410   Wound Depth (cm) 0.1 cm 07/06/22 1410   Wound Surface Area (cm^2) 73.73 cm^2 07/06/22 1410   Change in Wound Size % 32.97 07/06/22 1410   Wound Volume (cm^3) 7.373 cm^3 07/06/22 1410   Wound Healing % 33 07/06/22 1410   Post-Procedure Length (cm) 11.2 cm 06/01/22 1428   Post-Procedure Width (cm) 8.2 cm 06/01/22 1428   Post-Procedure Depth (cm) 0.2 cm 06/01/22 1428   Post-Procedure Surface Area (cm^2) 91.84 cm^2 06/01/22 1428   Post-Procedure Volume (cm^3) 18.368 cm^3 06/01/22 1428   Wound Assessment Slough;Granulation tissue 07/06/22 1410   Drainage Amount Moderate 07/06/22 1410   Drainage Description Serosanguinous; Yellow 07/06/22 1410   Wound Odor None 07/06/22 1410   Alissa-Wound/Incision Assessment Intact 07/06/22 1410   Edges Attached edges 07/06/22 1410   Wound Thickness Description Full thickness 07/06/22 1410   Number of days: 126       Wound Heel Right #2 (Active)   Wound Image   07/06/22 1406   Wound Etiology Venous 07/06/22 1406   Dressing Status Clean;Dry; Intact 07/06/22 1406   Cleansed Soap and water 07/06/22 1406   Wound Length (cm) 0.6 cm 07/06/22 1406   Wound Width (cm) 0.5 cm 07/06/22 1406   Wound Depth (cm) 0.1 cm 07/06/22 1406   Wound Surface Area (cm^2) 0.3 cm^2 07/06/22 1406   Change in Wound Size % 93.14 07/06/22 1406   Wound Volume (cm^3) 0.03 cm^3 07/06/22 1406   Wound Healing % 93 07/06/22 1406   Post-Procedure Length (cm) 1 cm 05/11/22 1457   Post-Procedure Width (cm) 1 cm 05/11/22 1457   Post-Procedure Depth (cm) 0.2 cm 05/11/22 1457   Post-Procedure Surface Area (cm^2) 1 cm^2 05/11/22 1457   Post-Procedure Volume (cm^3) 0.2 cm^3 05/11/22 1457   Wound Assessment Granulation tissue;Slough 07/06/22 1406   Drainage Amount Small 07/06/22 1406   Drainage Description Serosanguinous 07/06/22 1406   Wound Odor None 07/06/22 1406   Alissa-Wound/Incision Assessment Intact 07/06/22 1406   Edges Attached edges 07/06/22 1406   Wound Thickness Description Full thickness 07/06/22 1406   Number of days: 105       Wound Leg Right #3 06/15/22 (Active)   Wound Image   07/06/22 1404   Wound Etiology Venous 07/06/22 1404   Dressing Status Clean;Dry; Intact 07/06/22 1404   Cleansed Soap and water 07/06/22 1404   Wound Length (cm) 12 cm 07/06/22 1404   Wound Width (cm) 25.5 cm 07/06/22 1404   Wound Depth (cm) 0.1 cm 07/06/22 1404   Wound Surface Area (cm^2) 306 cm^2 07/06/22 1404   Change in Wound Size % -437.31 07/06/22 1404   Wound Volume (cm^3) 30.6 cm^3 07/06/22 1404   Wound Healing % -437 07/06/22 1404   Wound Assessment Granulation tissue;Slough;Bleeding 07/06/22 1404   Drainage Amount Small 07/06/22 1404   Drainage Description Serosanguinous 07/06/22 1404   Wound Odor None 07/06/22 1404   Alissa-Wound/Incision Assessment Intact 07/06/22 1404   Edges Attached edges 07/06/22 1404   Wound Thickness Description Full thickness 07/06/22 1404   Number of days: 21        Plan:   Continue silver cell, drawtex, Coflex light    Treatment Note please see attached Discharge Instructions    Written patient dismissal instructions given to patient or POA.          Electronically signed by Edison Markham MD on 7/6/2022 at 2:33 PM

## 2022-07-11 ENCOUNTER — APPOINTMENT (OUTPATIENT)
Dept: GENERAL RADIOLOGY | Age: 67
DRG: 683 | End: 2022-07-11
Attending: EMERGENCY MEDICINE
Payer: MEDICARE

## 2022-07-11 ENCOUNTER — APPOINTMENT (OUTPATIENT)
Dept: CT IMAGING | Age: 67
DRG: 683 | End: 2022-07-11
Attending: EMERGENCY MEDICINE
Payer: MEDICARE

## 2022-07-11 ENCOUNTER — HOSPITAL ENCOUNTER (INPATIENT)
Age: 67
LOS: 3 days | Discharge: HOME HEALTH | DRG: 683 | End: 2022-07-14
Attending: EMERGENCY MEDICINE | Admitting: FAMILY MEDICINE
Payer: MEDICARE

## 2022-07-11 ENCOUNTER — APPOINTMENT (OUTPATIENT)
Dept: ULTRASOUND IMAGING | Age: 67
DRG: 683 | End: 2022-07-11
Attending: EMERGENCY MEDICINE
Payer: MEDICARE

## 2022-07-11 DIAGNOSIS — N17.9 AKI (ACUTE KIDNEY INJURY) (HCC): Primary | ICD-10-CM

## 2022-07-11 LAB
ALBUMIN SERPL-MCNC: 2.9 G/DL (ref 3.5–5)
ALBUMIN/GLOB SERPL: 0.8 {RATIO} (ref 1.1–2.2)
ALP SERPL-CCNC: 81 U/L (ref 45–117)
ALT SERPL-CCNC: 22 U/L (ref 12–78)
ANION GAP SERPL CALC-SCNC: 7 MMOL/L (ref 5–15)
APPEARANCE UR: CLEAR
AST SERPL W P-5'-P-CCNC: 15 U/L (ref 15–37)
ATRIAL RATE: 60 BPM
BACTERIA URNS QL MICRO: NEGATIVE /HPF
BASOPHILS # BLD: 0 K/UL (ref 0–0.1)
BASOPHILS NFR BLD: 0 % (ref 0–1)
BILIRUB SERPL-MCNC: 1.4 MG/DL (ref 0.2–1)
BILIRUB UR QL: NEGATIVE
BUN SERPL-MCNC: 37 MG/DL (ref 6–20)
BUN/CREAT SERPL: 15 (ref 12–20)
CA-I BLD-MCNC: 8.5 MG/DL (ref 8.5–10.1)
CALCULATED P AXIS, ECG09: 115 DEGREES
CALCULATED R AXIS, ECG10: 119 DEGREES
CALCULATED T AXIS, ECG11: 9 DEGREES
CHLORIDE SERPL-SCNC: 101 MMOL/L (ref 97–108)
CO2 SERPL-SCNC: 31 MMOL/L (ref 21–32)
COLOR UR: ABNORMAL
COVID-19 RAPID TEST, COVR: NOT DETECTED
CREAT SERPL-MCNC: 2.53 MG/DL (ref 0.7–1.3)
DIAGNOSIS, 93000: NORMAL
DIFFERENTIAL METHOD BLD: ABNORMAL
EOSINOPHIL # BLD: 0.3 K/UL (ref 0–0.4)
EOSINOPHIL NFR BLD: 3 % (ref 0–7)
ERYTHROCYTE [DISTWIDTH] IN BLOOD BY AUTOMATED COUNT: 13.3 % (ref 11.5–14.5)
GLOBULIN SER CALC-MCNC: 3.5 G/DL (ref 2–4)
GLUCOSE BLD STRIP.AUTO-MCNC: 145 MG/DL (ref 65–117)
GLUCOSE BLD STRIP.AUTO-MCNC: 160 MG/DL (ref 65–117)
GLUCOSE SERPL-MCNC: 125 MG/DL (ref 65–100)
GLUCOSE UR STRIP.AUTO-MCNC: >300 MG/DL
HCT VFR BLD AUTO: 36.7 % (ref 36.6–50.3)
HGB BLD-MCNC: 11.5 G/DL (ref 12.1–17)
HGB UR QL STRIP: NEGATIVE
IMM GRANULOCYTES # BLD AUTO: 0.1 K/UL (ref 0–0.04)
IMM GRANULOCYTES NFR BLD AUTO: 1 % (ref 0–0.5)
KETONES UR QL STRIP.AUTO: NEGATIVE MG/DL
LACTATE SERPL-SCNC: 2 MMOL/L (ref 0.4–2)
LEUKOCYTE ESTERASE UR QL STRIP.AUTO: ABNORMAL
LYMPHOCYTES # BLD: 1.1 K/UL (ref 0.8–3.5)
LYMPHOCYTES NFR BLD: 9 % (ref 12–49)
MCH RBC QN AUTO: 30.1 PG (ref 26–34)
MCHC RBC AUTO-ENTMCNC: 31.3 G/DL (ref 30–36.5)
MCV RBC AUTO: 96.1 FL (ref 80–99)
MONOCYTES # BLD: 1.1 K/UL (ref 0–1)
MONOCYTES NFR BLD: 9 % (ref 5–13)
MUCOUS THREADS URNS QL MICRO: ABNORMAL /LPF
NEUTS SEG # BLD: 9.8 K/UL (ref 1.8–8)
NEUTS SEG NFR BLD: 78 % (ref 32–75)
NITRITE UR QL STRIP.AUTO: NEGATIVE
NRBC # BLD: 0 K/UL (ref 0–0.01)
NRBC BLD-RTO: 0 PER 100 WBC
P-R INTERVAL, ECG05: 158 MS
PERFORMED BY, TECHID: ABNORMAL
PERFORMED BY, TECHID: ABNORMAL
PH UR STRIP: 5 [PH] (ref 5–8)
PLATELET # BLD AUTO: 204 K/UL (ref 150–400)
PMV BLD AUTO: 11.2 FL (ref 8.9–12.9)
POTASSIUM SERPL-SCNC: 3.8 MMOL/L (ref 3.5–5.1)
PROT SERPL-MCNC: 6.4 G/DL (ref 6.4–8.2)
PROT UR STRIP-MCNC: NEGATIVE MG/DL
Q-T INTERVAL, ECG07: 402 MS
QRS DURATION, ECG06: 116 MS
QTC CALCULATION (BEZET), ECG08: 402 MS
RBC # BLD AUTO: 3.82 M/UL (ref 4.1–5.7)
RBC #/AREA URNS HPF: ABNORMAL /HPF (ref 0–5)
SODIUM SERPL-SCNC: 139 MMOL/L (ref 136–145)
SP GR UR REFRACTOMETRY: 1.01 (ref 1–1.03)
TROPONIN-HIGH SENSITIVITY: 21 NG/L (ref 0–76)
UA: UC IF INDICATED,UAUC: ABNORMAL
UROBILINOGEN UR QL STRIP.AUTO: 0.1 EU/DL (ref 0.1–1)
VENTRICULAR RATE, ECG03: 60 BPM
WBC # BLD AUTO: 12.4 K/UL (ref 4.1–11.1)
WBC URNS QL MICRO: ABNORMAL /HPF (ref 0–4)

## 2022-07-11 PROCEDURE — 83605 ASSAY OF LACTIC ACID: CPT

## 2022-07-11 PROCEDURE — 93005 ELECTROCARDIOGRAM TRACING: CPT

## 2022-07-11 PROCEDURE — 85025 COMPLETE CBC W/AUTO DIFF WBC: CPT

## 2022-07-11 PROCEDURE — 74011250637 HC RX REV CODE- 250/637: Performed by: FAMILY MEDICINE

## 2022-07-11 PROCEDURE — 96374 THER/PROPH/DIAG INJ IV PUSH: CPT

## 2022-07-11 PROCEDURE — 81001 URINALYSIS AUTO W/SCOPE: CPT

## 2022-07-11 PROCEDURE — 74176 CT ABD & PELVIS W/O CONTRAST: CPT

## 2022-07-11 PROCEDURE — 74011636637 HC RX REV CODE- 636/637: Performed by: FAMILY MEDICINE

## 2022-07-11 PROCEDURE — 87635 SARS-COV-2 COVID-19 AMP PRB: CPT

## 2022-07-11 PROCEDURE — 36415 COLL VENOUS BLD VENIPUNCTURE: CPT

## 2022-07-11 PROCEDURE — 74011250636 HC RX REV CODE- 250/636: Performed by: FAMILY MEDICINE

## 2022-07-11 PROCEDURE — 99285 EMERGENCY DEPT VISIT HI MDM: CPT

## 2022-07-11 PROCEDURE — 96376 TX/PRO/DX INJ SAME DRUG ADON: CPT

## 2022-07-11 PROCEDURE — 80053 COMPREHEN METABOLIC PANEL: CPT

## 2022-07-11 PROCEDURE — 76770 US EXAM ABDO BACK WALL COMP: CPT

## 2022-07-11 PROCEDURE — 83036 HEMOGLOBIN GLYCOSYLATED A1C: CPT

## 2022-07-11 PROCEDURE — 65270000029 HC RM PRIVATE

## 2022-07-11 PROCEDURE — 84484 ASSAY OF TROPONIN QUANT: CPT

## 2022-07-11 PROCEDURE — 71045 X-RAY EXAM CHEST 1 VIEW: CPT

## 2022-07-11 PROCEDURE — 74011250636 HC RX REV CODE- 250/636: Performed by: EMERGENCY MEDICINE

## 2022-07-11 PROCEDURE — 82962 GLUCOSE BLOOD TEST: CPT

## 2022-07-11 RX ORDER — FLUTICASONE PROPIONATE 50 MCG
2 SPRAY, SUSPENSION (ML) NASAL DAILY
Status: DISCONTINUED | OUTPATIENT
Start: 2022-07-12 | End: 2022-07-14 | Stop reason: HOSPADM

## 2022-07-11 RX ORDER — CLONIDINE HYDROCHLORIDE 0.1 MG/1
0.1 TABLET ORAL EVERY 12 HOURS
Status: DISCONTINUED | OUTPATIENT
Start: 2022-07-11 | End: 2022-07-13

## 2022-07-11 RX ORDER — GUAIFENESIN 100 MG/5ML
81 LIQUID (ML) ORAL DAILY
Status: DISCONTINUED | OUTPATIENT
Start: 2022-07-12 | End: 2022-07-14 | Stop reason: HOSPADM

## 2022-07-11 RX ORDER — ASCORBIC ACID 500 MG
1000 TABLET ORAL 2 TIMES DAILY
Status: DISCONTINUED | OUTPATIENT
Start: 2022-07-11 | End: 2022-07-14 | Stop reason: HOSPADM

## 2022-07-11 RX ORDER — HEPARIN SODIUM 5000 [USP'U]/ML
5000 INJECTION, SOLUTION INTRAVENOUS; SUBCUTANEOUS EVERY 8 HOURS
Status: DISCONTINUED | OUTPATIENT
Start: 2022-07-11 | End: 2022-07-14 | Stop reason: HOSPADM

## 2022-07-11 RX ORDER — INSULIN GLARGINE 100 [IU]/ML
80 INJECTION, SOLUTION SUBCUTANEOUS DAILY
Status: DISCONTINUED | OUTPATIENT
Start: 2022-07-12 | End: 2022-07-14 | Stop reason: HOSPADM

## 2022-07-11 RX ORDER — ACETAMINOPHEN 325 MG/1
650 TABLET ORAL
Status: DISCONTINUED | OUTPATIENT
Start: 2022-07-11 | End: 2022-07-14 | Stop reason: HOSPADM

## 2022-07-11 RX ORDER — MAGNESIUM SULFATE 100 %
4 CRYSTALS MISCELLANEOUS AS NEEDED
Status: DISCONTINUED | OUTPATIENT
Start: 2022-07-11 | End: 2022-07-14 | Stop reason: HOSPADM

## 2022-07-11 RX ORDER — POLYETHYLENE GLYCOL 3350 17 G/17G
17 POWDER, FOR SOLUTION ORAL DAILY PRN
Status: DISCONTINUED | OUTPATIENT
Start: 2022-07-11 | End: 2022-07-14 | Stop reason: HOSPADM

## 2022-07-11 RX ORDER — ONDANSETRON 4 MG/1
4 TABLET, ORALLY DISINTEGRATING ORAL
Status: DISCONTINUED | OUTPATIENT
Start: 2022-07-11 | End: 2022-07-14 | Stop reason: HOSPADM

## 2022-07-11 RX ORDER — OXYCODONE HYDROCHLORIDE 5 MG/1
5 TABLET ORAL EVERY 12 HOURS
Status: DISCONTINUED | OUTPATIENT
Start: 2022-07-11 | End: 2022-07-11

## 2022-07-11 RX ORDER — BUMETANIDE 1 MG/1
2 TABLET ORAL 2 TIMES DAILY
Status: DISCONTINUED | OUTPATIENT
Start: 2022-07-11 | End: 2022-07-14 | Stop reason: HOSPADM

## 2022-07-11 RX ORDER — ONDANSETRON 2 MG/ML
4 INJECTION INTRAMUSCULAR; INTRAVENOUS
Status: DISCONTINUED | OUTPATIENT
Start: 2022-07-11 | End: 2022-07-14 | Stop reason: HOSPADM

## 2022-07-11 RX ORDER — HYDROMORPHONE HYDROCHLORIDE 1 MG/ML
0.5 INJECTION, SOLUTION INTRAMUSCULAR; INTRAVENOUS; SUBCUTANEOUS ONCE
Status: COMPLETED | OUTPATIENT
Start: 2022-07-11 | End: 2022-07-11

## 2022-07-11 RX ORDER — COLCHICINE 0.6 MG/1
0.6 TABLET ORAL DAILY
Status: DISCONTINUED | OUTPATIENT
Start: 2022-07-12 | End: 2022-07-14 | Stop reason: HOSPADM

## 2022-07-11 RX ORDER — TAMSULOSIN HYDROCHLORIDE 0.4 MG/1
0.4 CAPSULE ORAL DAILY
Status: DISCONTINUED | OUTPATIENT
Start: 2022-07-12 | End: 2022-07-14 | Stop reason: HOSPADM

## 2022-07-11 RX ORDER — GABAPENTIN 300 MG/1
300 CAPSULE ORAL 4 TIMES DAILY
Status: DISCONTINUED | OUTPATIENT
Start: 2022-07-11 | End: 2022-07-14 | Stop reason: HOSPADM

## 2022-07-11 RX ORDER — INSULIN GLARGINE 100 [IU]/ML
34 INJECTION, SOLUTION SUBCUTANEOUS
Status: DISCONTINUED | OUTPATIENT
Start: 2022-07-11 | End: 2022-07-14 | Stop reason: HOSPADM

## 2022-07-11 RX ORDER — ACETAMINOPHEN 650 MG/1
650 SUPPOSITORY RECTAL
Status: DISCONTINUED | OUTPATIENT
Start: 2022-07-11 | End: 2022-07-14 | Stop reason: HOSPADM

## 2022-07-11 RX ORDER — OXYCODONE HYDROCHLORIDE 5 MG/1
5 TABLET ORAL EVERY 12 HOURS
Status: DISCONTINUED | OUTPATIENT
Start: 2022-07-12 | End: 2022-07-14 | Stop reason: HOSPADM

## 2022-07-11 RX ORDER — LANOLIN ALCOHOL/MO/W.PET/CERES
1 CREAM (GRAM) TOPICAL
Status: DISCONTINUED | OUTPATIENT
Start: 2022-07-11 | End: 2022-07-14 | Stop reason: HOSPADM

## 2022-07-11 RX ORDER — ALBUTEROL SULFATE 90 UG/1
2 AEROSOL, METERED RESPIRATORY (INHALATION)
Status: DISCONTINUED | OUTPATIENT
Start: 2022-07-11 | End: 2022-07-14 | Stop reason: HOSPADM

## 2022-07-11 RX ORDER — SODIUM CHLORIDE 9 MG/ML
100 INJECTION, SOLUTION INTRAVENOUS CONTINUOUS
Status: DISPENSED | OUTPATIENT
Start: 2022-07-11 | End: 2022-07-12

## 2022-07-11 RX ORDER — INSULIN LISPRO 100 [IU]/ML
INJECTION, SOLUTION INTRAVENOUS; SUBCUTANEOUS
Status: DISCONTINUED | OUTPATIENT
Start: 2022-07-11 | End: 2022-07-14 | Stop reason: HOSPADM

## 2022-07-11 RX ADMIN — CLONIDINE HYDROCHLORIDE 0.1 MG: 0.1 TABLET ORAL at 22:00

## 2022-07-11 RX ADMIN — ACETAMINOPHEN 650 MG: 325 TABLET ORAL at 16:39

## 2022-07-11 RX ADMIN — HYDROMORPHONE HYDROCHLORIDE 0.5 MG: 1 INJECTION, SOLUTION INTRAMUSCULAR; INTRAVENOUS; SUBCUTANEOUS at 11:28

## 2022-07-11 RX ADMIN — OXYCODONE 5 MG: 5 TABLET ORAL at 16:33

## 2022-07-11 RX ADMIN — INSULIN GLARGINE 34 UNITS: 100 INJECTION, SOLUTION SUBCUTANEOUS at 22:01

## 2022-07-11 RX ADMIN — GABAPENTIN 300 MG: 300 CAPSULE ORAL at 22:00

## 2022-07-11 RX ADMIN — BUMETANIDE 2 MG: 1 TABLET ORAL at 22:01

## 2022-07-11 RX ADMIN — FERROUS SULFATE TAB 325 MG (65 MG ELEMENTAL FE) 325 MG: 325 (65 FE) TAB at 16:33

## 2022-07-11 RX ADMIN — HEPARIN SODIUM 5000 UNITS: 5000 INJECTION INTRAVENOUS; SUBCUTANEOUS at 16:33

## 2022-07-11 RX ADMIN — HEPARIN SODIUM 5000 UNITS: 5000 INJECTION INTRAVENOUS; SUBCUTANEOUS at 22:00

## 2022-07-11 RX ADMIN — OXYCODONE HYDROCHLORIDE AND ACETAMINOPHEN 1000 MG: 500 TABLET ORAL at 22:00

## 2022-07-11 RX ADMIN — INSULIN LISPRO 3 UNITS: 100 INJECTION, SOLUTION INTRAVENOUS; SUBCUTANEOUS at 16:41

## 2022-07-11 RX ADMIN — HYDROMORPHONE HYDROCHLORIDE 0.5 MG: 1 INJECTION, SOLUTION INTRAMUSCULAR; INTRAVENOUS; SUBCUTANEOUS at 13:18

## 2022-07-11 RX ADMIN — GABAPENTIN 300 MG: 300 CAPSULE ORAL at 18:27

## 2022-07-11 NOTE — PROGRESS NOTES
7/11/22 Pt accepted to [id number]  @ ( [phone]) per [place] upon discharge. Est SOC: 7/13/22.

## 2022-07-11 NOTE — ED TRIAGE NOTES
Patient recently hospitalized for kidney failure.  Complains of cp with inspiration and bilateral flank pain, decreased urinary output and incontinence

## 2022-07-11 NOTE — ACP (ADVANCE CARE PLANNING)
Advance Care Planning   Healthcare Decision Maker:       Primary Decision Maker: Cinthia Lemos [facility] - [phone]

## 2022-07-11 NOTE — PROGRESS NOTES
Reason for Admission: MAURICIO                     RUR Score:    N/A                 Plan for utilizing home health:   Pt signed Choice Letter to continue services with [facility] for wound care. Referral sent via [employer]. Pt uses cane/rollator @ times. Home O2/[employer] via [facility].        PCP: First and Last name:  Obed Ramirez MD     Name of Practice:    Are you a current patient: Yes/No: Yes   Approximate date of last visit: Seen Np in office a few weeks ago. Can you participate in a virtual visit with your PCP: Yes/Call/Has cell phone. Current Advanced Directive/Advance Care Plan: Full Code      Healthcare Decision Maker:              Primary Decision Maker: Derik Pryor - [phone]                  Transition of Care Plan:   D/C Plan is home with wife, cuong,e health, & O2. Call Rxs into [facility].

## 2022-07-11 NOTE — ED PROVIDER NOTES
EMERGENCY DEPARTMENT HISTORY AND PHYSICAL EXAM      Date: 7/11/2022  Patient Name: Juve Ibanez      History of Presenting Illness     Chief Complaint   Patient presents with    Chest Pain    Flank Pain     kidneys       History Provided By: Patient    HPI: Juve Ibanez, 78 y.o. male with a past medical history significant diabetes, hypertension, COPD and CAD, recent MAURICIO  presents to the ED with cc of flank pain. Patient states his bilateral kidneys have been hurting. He has a history of chronic back pain but this feels different. He has no falls or trauma. States he is making less urine than normal and his urine is very dark. He had a recent hospitalization at outside hospital for MAURICIO and feels like that might be going on again. Has a remote history of a renal stone but nothing recently. No fevers. No nausea or vomiting. Denies shortness of breath. There are no other complaints, changes, or physical findings at this time. PCP: Therese Brantley MD    Current Facility-Administered Medications   Medication Dose Route Frequency Provider Last Rate Last Admin    albuterol (PROVENTIL HFA, VENTOLIN HFA, PROAIR HFA) inhaler 2 Puff  2 Puff Inhalation Q6H PRN Johnathan Patel MD        ascorbic acid (vitamin C) (VITAMIN C) tablet 1,000 mg  1,000 mg Oral BID Johnathan Patel MD       [facility] [START ON 7/12/2022] aspirin chewable tablet 81 mg  81 mg Oral DAILY Johnathan Patel MD       [facility] . PHARMACY TO SUBSTITUTE PER PROTOCOL (Reordered from: b complex vitamins tablet)    Per Protocol Johnathan Patel MD  bumetanide Darina Scott) tablet 2 mg  2 mg Oral BID Johnathan Patel MD        cloNIDine HCL (CATAPRES) tablet 0.1 mg  0.1 mg Oral Q12H Moni Patel MD       [facility] [START ON 7/12/2022] colchicine tablet 0.6 mg  0.6 mg Oral DAILY Johnathan Patel MD        ferrous sulfate tablet 325 mg  1 Tablet Oral TID WITH MEALS Johnathan Patel MD       [facility] . PHARMACY TO SUBSTITUTE PER PROTOCOL (Reordered from: flunisolide (NASAREL) 25 mcg (0.025 %) spry)    Per Protocol Constantin Patel MD        gabapentin (NEURONTIN) capsule 300 mg  300 mg Oral QID MD Norma Lo See . PHARMACY TO SUBSTITUTE PER PROTOCOL (Reordered from: insulin glargine,hum.rec.anlog (TOUJEO MAX U-300 SOLOSTAR SC))    Per Protocol Constantin Patel MD        oxyCODONE IR (ROXICODONE) tablet 5 mg  5 mg Oral Q12H Moni Patel MD Erlinda See [START ON 7/12/2022] tamsulosin (FLOMAX) capsule 0.4 mg  0.4 mg Oral DAILY Constantin Patel MD Erlinda See . PHARMACY TO SUBSTITUTE PER PROTOCOL (Reordered from: tiotropium (Spiriva with HandiHaler) 18 mcg inhalation capsule)    Per Protocol Constantin Patel MD Erlinda See insulin lispro (HUMALOG) injection   SubCUTAneous AC&HS Constantin Patel MD        glucose chewable tablet 16 g  4 Tablet Oral PRN Constantin Patel MD        glucagon (GLUCAGEN) injection 1 mg  1 mg IntraMUSCular PRN Constantin Patel MD        acetaminophen (TYLENOL) tablet 650 mg  650 mg Oral Q6H PRN Constantin Patel MD        Or   Norma See acetaminophen (TYLENOL) suppository 650 mg  650 mg Rectal Q6H PRN Constantin Patel MD        polyethylene glycol (MIRALAX) packet 17 g  17 g Oral DAILY PRN Constantin Patel MD        ondansetron (ZOFRAN ODT) tablet 4 mg  4 mg Oral Q8H PRN Constantin Patel MD        Or    ondansetron [facility]) injection 4 mg  4 mg IntraVENous Q6H PRN Constantin Patel MD        heparin (porcine) injection 5,000 Units  5,000 Units SubCUTAneous Q8H Moni Patel MD         Current Outpatient Medications   Medication Sig Dispense Refill    insulin glargine,hum.rec.anlog (TOUJEO MAX U-300 SOLOSTAR SC) 80 Units by SubCUTAneous route two (2) times a day. 80 units in am, 34 units in pm      b complex vitamins tablet Take 1 Tablet by mouth daily.  cloNIDine HCL (CATAPRES) 0.1 mg tablet Take 1 Tablet by mouth every twelve (12) hours.       ammonium lactate (AMLACTIN) 12 % topical cream Apply  to affected area as needed for Other (xerosis). rub in to affected area well  Indications: dry skin 280 g 0    vitamin E, dl,tocopheryl acet, (vitamin E acetate) 400 unit capsule Take 2 Capsules by mouth two (2) times a day. 100 Capsule 0    ascorbic acid, vitamin C, (Vitamin C) 500 mg tablet Take 1,000 mg by mouth two (2) times a day. Indications: inadequate vitamin C      tamsulosin (Flomax) 0.4 mg capsule Take 0.4 mg by mouth daily.  tiotropium (Spiriva with HandiHaler) 18 mcg inhalation capsule Take 1 Capsule by inhalation daily.  bumetanide (BUMEX) 2 mg tablet Take 2 mg by mouth two (2) times a day.  ferrous sulfate (Iron) 325 mg (65 mg iron) tablet Take  by mouth Daily (before breakfast).  aspirin 81 mg chewable tablet Take 81 mg by mouth daily.  flunisolide (NASAREL) 25 mcg (0.025 %) spry 2 Sprays two (2) times a day.  albuterol (ProAir HFA) 90 mcg/actuation inhaler Take  by inhalation.  colchicine 0.6 mg tablet Take 0.6 mg by mouth daily.  gabapentin (NEURONTIN) 300 mg capsule Take 300 mg by mouth four (4) times daily.  oxyCODONE IR (ROXICODONE) 5 mg immediate release tablet Take 5 mg by mouth every twelve (12) hours.  insulin lispro (HumaLOG U-100 Insulin) 100 unit/mL injection by SubCUTAneous route.  Sliding scale         Past History     Past Medical History:  Past Medical History:   Diagnosis Date    Arthritis     CAD (coronary artery disease)     [facility] obstructive pulmonary disease [facility] [address]     Diabetes ([facility] [address]     Gout     Hypertension     Ill-defined condition     Sleep apnea        Past Surgical History:  Past Surgical History:   Procedure Laterality Date    HX ORTHOPAEDIC      HX PACEMAKER      HX VEIN ABLATION ADHESIVE         Family History:  Family History   Problem Relation Age of Onset    Heart Disease Mother     Kidney Disease Mother     Hypertension Mother     Diabetes Mother     Heart Disease Father     Hypertension Father     Diabetes Sister  Diabetes Brother        Social History:  Social History     Tobacco Use    Smoking status: Never Smoker    Smokeless tobacco: Never Used   Vaping Use    Vaping Use: Never used   Substance Use Topics    Alcohol use: Not Currently    Drug use: Never       Allergies: Allergies   Allergen Reactions    Elavil Angioedema    Naproxen Unknown (comments)     Pt not sure of reaction    Sulfa (Sulfonamide Antibiotics) Nausea and Vomiting         Review of Systems     Review of Systems   Constitutional: Negative for activity change, appetite change and fever. HENT: Negative for rhinorrhea and sore throat. Eyes: Negative for visual disturbance. Respiratory: Negative for cough and shortness of breath. Cardiovascular: Positive for chest pain. Gastrointestinal: Negative for abdominal pain, diarrhea, nausea and vomiting. Genitourinary: Positive for flank pain. Negative for dysuria. Musculoskeletal: Negative for arthralgias and myalgias. Skin: Negative for rash. Neurological: Negative for headaches. Psychiatric/Behavioral: Negative for confusion. All other systems reviewed and are negative. Physical Exam     Physical Exam  Vitals and nursing note reviewed. Constitutional:       General: He is not in acute distress. Appearance: Normal appearance. He is obese. He is not toxic-appearing. HENT:      Head: Normocephalic and atraumatic. Eyes:      Pupils: Pupils are equal, round, and reactive to light. Cardiovascular:      Rate and Rhythm: Normal rate and regular rhythm. Pulses: Normal pulses. Pulmonary:      Effort: Pulmonary effort is normal.   Abdominal:      Palpations: Abdomen is soft. Tenderness: There is no abdominal tenderness. There is right CVA tenderness and left CVA tenderness. Musculoskeletal:         General: No swelling or deformity. Right lower leg: Edema present. Left lower leg: Edema present.       Comments: No C, T, L-spine tenderness to palpation. No step-offs. Skin:     Coloration: Skin is not pale. Findings: No rash. Neurological:      General: No focal deficit present. Mental Status: He is alert and oriented to person, place, and time. Cranial Nerves: No cranial nerve deficit. Sensory: No sensory deficit. Motor: No weakness. Psychiatric:         Mood and Affect: Mood normal.         Behavior: Behavior normal.         Lab and Diagnostic Study Results     Labs -     Recent Results (from the past 12 hour(s))   CBC WITH AUTOMATED DIFF    Collection Time: 07/11/22  9:50 AM   Result Value Ref Range    WBC 12.4 (H) 4.1 - 11.1 K/uL    RBC 3.82 (L) 4.10 - 5.70 M/uL    HGB 11.5 (L) 12.1 - 17.0 g/dL    HCT 36.7 36.6 - 50.3 %    MCV 96.1 80.0 - 99.0 FL    MCH 30.1 26.0 - 34.0 PG    MCHC 31.3 30.0 - 36.5 g/dL    RDW 13.3 11.5 - 14.5 %    PLATELET 521 621 - 353 K/uL    MPV 11.2 8.9 - 12.9 FL    NRBC 0.0 0.0  WBC    ABSOLUTE NRBC 0.00 0.00 - 0.01 K/uL    NEUTROPHILS 78 (H) 32 - 75 %    LYMPHOCYTES 9 (L) 12 - 49 %    MONOCYTES 9 5 - 13 %    EOSINOPHILS 3 0 - 7 %    BASOPHILS 0 0 - 1 %    IMMATURE GRANULOCYTES 1 (H) 0 - 0.5 %    ABS. NEUTROPHILS 9.8 (H) 1.8 - 8.0 K/UL    ABS. LYMPHOCYTES 1.1 0.8 - 3.5 K/UL    ABS. MONOCYTES 1.1 (H) 0.0 - 1.0 K/UL    ABS. EOSINOPHILS 0.3 0.0 - 0.4 K/UL    ABS. BASOPHILS 0.0 0.0 - 0.1 K/UL    ABS. IMM.  GRANS. 0.1 (H) 0.00 - 0.04 K/UL    DF AUTOMATED     METABOLIC PANEL, COMPREHENSIVE    Collection Time: 07/11/22  9:50 AM   Result Value Ref Range    Sodium 139 136 - 145 mmol/L    Potassium 3.8 3.5 - 5.1 mmol/L    Chloride 101 97 - 108 mmol/L    CO2 31 21 - 32 mmol/L    Anion gap 7 5 - 15 mmol/L    Glucose 125 (H) 65 - 100 mg/dL    BUN 37 (H) 6 - 20 mg/dL    Creatinine 2.53 (H) 0.70 - 1.30 mg/dL    BUN/Creatinine ratio 15 12 - 20      GFR est AA 31 (L) >60 ml/min/1.73m2    GFR est non-AA 26 (L) >60 ml/min/1.73m2    Calcium 8.5 8.5 - 10.1 mg/dL    Bilirubin, total 1.4 (H) 0.2 - 1.0 mg/dL    AST (SGOT) 15 15 - 37 U/L    ALT (SGPT) 22 12 - 78 U/L    Alk. phosphatase 81 45 - 117 U/L    Protein, total 6.4 6.4 - 8.2 g/dL    Albumin 2.9 (L) 3.5 - 5.0 g/dL    Globulin 3.5 2.0 - 4.0 g/dL    A-G Ratio 0.8 (L) 1.1 - 2.2     TROPONIN-HIGH SENSITIVITY    Collection Time: 07/11/22  9:50 AM   Result Value Ref Range    Troponin-High Sensitivity 21 0 - 76 ng/L   LACTIC ACID    Collection Time: 07/11/22 10:03 AM   Result Value Ref Range    Lactic acid 2.0 0.4 - 2.0 mmol/L   URINALYSIS W/ REFLEX CULTURE    Collection Time: 07/11/22 11:48 AM    Specimen: Urine   Result Value Ref Range    Color Yellow/Straw      Appearance Clear Clear      Specific gravity 1.011 1.003 - 1.030      pH (UA) 5.0 5.0 - 8.0      Protein Negative Negative mg/dL    Glucose >300 (A) Negative mg/dL    Ketone Negative Negative mg/dL    Bilirubin Negative Negative      Blood Negative Negative      Urobilinogen 0.1 0.1 - 1.0 EU/dL    Nitrites Negative Negative      Leukocyte Esterase Trace (A) Negative      UA:UC IF INDICATED Culture not indicated by UA result Culture not indicated by UA result      WBC 5-10 0 - 4 /hpf    RBC 0-5 0 - 5 /hpf    Bacteria Negative Negative /hpf    Mucus Trace /lpf       Radiologic Studies -   [unfilled]  CT Results  (Last 48 hours)               07/11/22 1146  CT ABD PELV WO CONT Final result    Impression:  1. Moderate left hydronephrosis and ureterectasis. No obstructing, radiopaque,   left urinary calculus. This suggests a recently passed stone. 2. Cirrhosis and portal hypertension. No ascites. Liver protocol CT or MRI is   recommended on a nonemergent basis for hepatocellular carcinoma screening. Narrative:  CT ABDOMEN AND PELVIS WITHOUT CONTRAST. 7/11/2022 11:46 AM        INDICATION: Flank pain       COMPARISON: 8/7/2016.        TECHNIQUE: CT of the abdomen and pelvis was performed without contrast. CT dose   reduction was achieved through use of a standardized protocol tailored for this   examination and automatic exposure control for dose modulation. FINDINGS: The study is limited by patient body habitus: redundant soft tissue   causes photon starvation, and the flank(s) touching the gantry cause(s)   truncation artifact. Abdomen: Moderate left hydronephrosis and ureterectasis suggests that a stone   has recently passed. There is no obstructing, radiopaque, left urinary calculus. Two, small, renal calculi in the left upper pole and 2016 are no longer present. Large renal calculi in the left lower pole are nonobstructing, as is a tiny   right interpolar renal calculus. The liver is cirrhotic, with surface nodularity, widening of the fissures, and   hypertrophy of the left lateral segment. There are small to moderate   portosystemic collaterals in the left upper quadrant. No ascites. A 20 x 25 x 16 mm, lobulated, homogenously isodense, right adrenal nodule is   indeterminate. However, it is not significantly changed from 2016, and is likely   a benign adenoma. The lung bases are clear. The heart size is normal. A   pacemaker/AICD is in place. Incidental note is made of a small periampullary   duodenal diverticulum. The unenhanced stomach, duodenum, gallbladder, pancreas,   spleen, right adrenal, and right kidney are normal.       Pelvis: Descending diverticulosis is mild. Nonenlarged, faintly calcified,   pelvic and retroperitoneal lymph nodes are of doubtful significance. The   unenhanced small bowel, ileocecal junction, appendix, colon, and bladder are   otherwise normal. No free air or fluid, and no abdominopelvic lymphadenopathy. The spinal canal is congenitally narrow, and there is multilevel lumbar canal   and neural foraminal stenosis. CXR Results  (Last 48 hours)               07/11/22 1000  XR CHEST PORT Final result    Impression:  No acute process. Narrative:   Indication: Chest pain       Comparison: 6/9/2021       Portable exam of the chest obtained at 956 demonstrates normal heart size. Pacer   leads are unchanged in position. There is no acute process in the lung fields. The osseous structures are unremarkable. Medical Decision Making and ED Course   - I am the first and primary provider for this patient AND AM THE PRIMARY PROVIDER OF RECORD. - I reviewed the vital signs, available nursing notes, past medical history, past surgical history, family history and social history. - Initial assessment performed. The patients presenting problems have been discussed, and the staff are in agreement with the care plan formulated and outlined with them. I have encouraged them to ask questions as they arise throughout their visit. Vital Signs-Reviewed the patient's vital signs. Patient Vitals for the past 12 hrs:   Temp Pulse Resp BP SpO2   07/11/22 1321 -- (!) 102 19 (!) 152/72 100 %   07/11/22 1200 -- 88 20 130/68 100 %   07/11/22 0910 98.7 °F (37.1 °C) 64 18 112/80 100 %         Records Reviewed: Nursing Notes        ED Course:       ED Course as of 07/11/22 1513   Mon Jul 11, 2022   1017 EKG performed at 0919, read at 925. Normal sinus rhythm with rate of 60. Normal AZ, normal QRS, normal QTC. [LW]   156 48-year-old male presents for chest pain and flank pain. States he was recently admitted for MAURICIO secondary to medications and was doing better. However he has developed bilateral flank pain and is no longer making urine. Does have any injury to his back and states he has chronic back pain but this is worse than normal.  The pain is on both sides but is not in the midline. No weakness. Does note urinary frequency and incontinence but does have the urge to urinate but cannot make it to the bathroom in time. No shortness of breath. Differential diagnosis is broad and includes UTI versus renal stone versus pyelonephritis versus MAURICIO versus ACS.   Getting labs including a CBC, CMP, troponin, lactate as well as a chest x-ray, CT of the abdomen pelvis and right upper quadrant ultrasound. Disposition as per clinical course. [LW]   9283   IMPRESSION  1. Moderate left hydronephrosis and ureterectasis. No obstructing, radiopaque,  left urinary calculus. This suggests a recently passed stone. 2. Cirrhosis and portal hypertension. No ascites. Liver protocol CT or MRI is  recommended on a nonemergent basis for hepatocellular carcinoma screening. Patient does have possible passed stone. No UTI. However does have MAURICIO on labs. Getting postvoid residual.  Admitted to Dr. Luma Mcdonald. [LW]   [address] PVR is 53. [LW]      ED Course User Index  [LW] Amanda Delacruz MD       Consultations:       Consultations: -  Hospitalist Consultant: Dr. Surekha Stoner: We have asked for emergent assistance with regard to this patient. We have discussed the patients HPI, ROS, PE and results this far. They will come and evaluate the patient for admission. Disposition     Disposition: Admitted to Floor Medical Floor the case was discussed with the admitting physician [place]. Admitted    Diagnosis     Clinical Impression:   1. MAURICIO (acute kidney injury) ([facility] [address]        Attestations:    Edita Welsh MD    Please note that this dictation was completed with LoopIt, the computer voice recognition software. Quite often unanticipated grammatical, syntax, homophones, and other interpretive errors are inadvertently transcribed by the computer software. Please disregard these errors. Please excuse any errors that have escaped final proofreading. Thank you.

## 2022-07-11 NOTE — Clinical Note
Status[de-identified] INPATIENT [101]   Type of Bed: Medical [8]   Cardiac Monitoring Required?: No   Inpatient Hospitalization Certified Necessary for the Following Reasons: 3.  Patient receiving treatment that can only be provided in an inpatient setting (further clarification in H&P documentation)   Admitting Diagnosis: MAURICIO (acute kidney injury) [facility]) [[id number]]   Admitting Physician: Raquel Gottron [[id number]]   Attending Physician: Raquel Gottron [[id number]]   Estimated Length of Stay: 2 Midnights   Discharge Plan[de-identified] Home with Office Follow-up

## 2022-07-12 LAB
ALBUMIN SERPL-MCNC: 2.4 G/DL (ref 3.5–5)
ALBUMIN/GLOB SERPL: 0.8 {RATIO} (ref 1.1–2.2)
ALP SERPL-CCNC: 83 U/L (ref 45–117)
ALT SERPL-CCNC: 20 U/L (ref 12–78)
ANION GAP SERPL CALC-SCNC: 6 MMOL/L (ref 5–15)
AST SERPL W P-5'-P-CCNC: 22 U/L (ref 15–37)
BASOPHILS # BLD: 0 K/UL (ref 0–0.1)
BASOPHILS NFR BLD: 0 % (ref 0–1)
BILIRUB SERPL-MCNC: 1.5 MG/DL (ref 0.2–1)
BUN SERPL-MCNC: 35 MG/DL (ref 6–20)
BUN/CREAT SERPL: 18 (ref 12–20)
CA-I BLD-MCNC: 8.4 MG/DL (ref 8.5–10.1)
CHLORIDE SERPL-SCNC: 104 MMOL/L (ref 97–108)
CO2 SERPL-SCNC: 31 MMOL/L (ref 21–32)
CREAT SERPL-MCNC: 1.91 MG/DL (ref 0.7–1.3)
CREAT UR-MCNC: 44 MG/DL
CREAT UR-MCNC: 45 MG/DL
DIFFERENTIAL METHOD BLD: ABNORMAL
EOSINOPHIL # BLD: 0.6 K/UL (ref 0–0.4)
EOSINOPHIL NFR BLD: 8 % (ref 0–7)
ERYTHROCYTE [DISTWIDTH] IN BLOOD BY AUTOMATED COUNT: 13.3 % (ref 11.5–14.5)
EST. AVERAGE GLUCOSE BLD GHB EST-MCNC: 114 MG/DL
GLOBULIN SER CALC-MCNC: 3.2 G/DL (ref 2–4)
GLUCOSE BLD STRIP.AUTO-MCNC: 111 MG/DL (ref 65–117)
GLUCOSE BLD STRIP.AUTO-MCNC: 128 MG/DL (ref 65–117)
GLUCOSE BLD STRIP.AUTO-MCNC: 141 MG/DL (ref 65–117)
GLUCOSE BLD STRIP.AUTO-MCNC: 97 MG/DL (ref 65–117)
GLUCOSE SERPL-MCNC: 132 MG/DL (ref 65–100)
HBA1C MFR BLD: 5.6 % (ref 4–5.6)
HCT VFR BLD AUTO: 33.1 % (ref 36.6–50.3)
HGB BLD-MCNC: 10.5 G/DL (ref 12.1–17)
IMM GRANULOCYTES # BLD AUTO: 0 K/UL (ref 0–0.04)
IMM GRANULOCYTES NFR BLD AUTO: 0 % (ref 0–0.5)
LYMPHOCYTES # BLD: 1 K/UL (ref 0.8–3.5)
LYMPHOCYTES NFR BLD: 13 % (ref 12–49)
MCH RBC QN AUTO: 30.8 PG (ref 26–34)
MCHC RBC AUTO-ENTMCNC: 31.7 G/DL (ref 30–36.5)
MCV RBC AUTO: 97.1 FL (ref 80–99)
MONOCYTES # BLD: 0.7 K/UL (ref 0–1)
MONOCYTES NFR BLD: 9 % (ref 5–13)
MRSA DNA SPEC QL NAA+PROBE: NOT DETECTED
NEUTS SEG # BLD: 5.1 K/UL (ref 1.8–8)
NEUTS SEG NFR BLD: 70 % (ref 32–75)
NRBC # BLD: 0 K/UL (ref 0–0.01)
NRBC BLD-RTO: 0 PER 100 WBC
PERFORMED BY, TECHID: ABNORMAL
PERFORMED BY, TECHID: ABNORMAL
PERFORMED BY, TECHID: NORMAL
PERFORMED BY, TECHID: NORMAL
PLATELET # BLD AUTO: 185 K/UL (ref 150–400)
PMV BLD AUTO: 11.5 FL (ref 8.9–12.9)
POTASSIUM SERPL-SCNC: 3.5 MMOL/L (ref 3.5–5.1)
POTASSIUM UR-SCNC: 16 MMOL/L
PROT SERPL-MCNC: 5.6 G/DL (ref 6.4–8.2)
PROT UR-MCNC: 22 MG/DL (ref 0–11.9)
PROT UR-MCNC: 22 MG/DL (ref 0–11.9)
PROT/CREAT UR-RTO: 0.5
RBC # BLD AUTO: 3.41 M/UL (ref 4.1–5.7)
SODIUM SERPL-SCNC: 141 MMOL/L (ref 136–145)
SODIUM UR-SCNC: 56 MMOL/L
WBC # BLD AUTO: 7.4 K/UL (ref 4.1–11.1)

## 2022-07-12 PROCEDURE — 84156 ASSAY OF PROTEIN URINE: CPT

## 2022-07-12 PROCEDURE — 77010033678 HC OXYGEN DAILY

## 2022-07-12 PROCEDURE — 87077 CULTURE AEROBIC IDENTIFY: CPT

## 2022-07-12 PROCEDURE — 74011250636 HC RX REV CODE- 250/636: Performed by: INTERNAL MEDICINE

## 2022-07-12 PROCEDURE — 82962 GLUCOSE BLOOD TEST: CPT

## 2022-07-12 PROCEDURE — 85025 COMPLETE CBC W/AUTO DIFF WBC: CPT

## 2022-07-12 PROCEDURE — 74011636637 HC RX REV CODE- 636/637: Performed by: FAMILY MEDICINE

## 2022-07-12 PROCEDURE — 94640 AIRWAY INHALATION TREATMENT: CPT

## 2022-07-12 PROCEDURE — 2W1MX6Z COMPRESSION OF LEFT LOWER EXTREMITY USING PRESSURE DRESSING: ICD-10-PCS | Performed by: FAMILY MEDICINE

## 2022-07-12 PROCEDURE — 87641 MR-STAPH DNA AMP PROBE: CPT

## 2022-07-12 PROCEDURE — 74011250636 HC RX REV CODE- 250/636: Performed by: FAMILY MEDICINE

## 2022-07-12 PROCEDURE — 82570 ASSAY OF URINE CREATININE: CPT

## 2022-07-12 PROCEDURE — 80053 COMPREHEN METABOLIC PANEL: CPT

## 2022-07-12 PROCEDURE — 94761 N-INVAS EAR/PLS OXIMETRY MLT: CPT

## 2022-07-12 PROCEDURE — 29581 APPL MULTLAYER CMPRN SYS LEG: CPT

## 2022-07-12 PROCEDURE — 87186 SC STD MICRODIL/AGAR DIL: CPT

## 2022-07-12 PROCEDURE — 84300 ASSAY OF URINE SODIUM: CPT

## 2022-07-12 PROCEDURE — 84133 ASSAY OF URINE POTASSIUM: CPT

## 2022-07-12 PROCEDURE — 87205 SMEAR GRAM STAIN: CPT

## 2022-07-12 PROCEDURE — 74011250637 HC RX REV CODE- 250/637: Performed by: FAMILY MEDICINE

## 2022-07-12 PROCEDURE — 65270000029 HC RM PRIVATE

## 2022-07-12 RX ORDER — OXYCODONE HYDROCHLORIDE 5 MG/1
5 TABLET ORAL
Status: DISCONTINUED | OUTPATIENT
Start: 2022-07-12 | End: 2022-07-14 | Stop reason: HOSPADM

## 2022-07-12 RX ADMIN — FERROUS SULFATE TAB 325 MG (65 MG ELEMENTAL FE) 325 MG: 325 (65 FE) TAB at 17:37

## 2022-07-12 RX ADMIN — OXYCODONE HYDROCHLORIDE AND ACETAMINOPHEN 1000 MG: 500 TABLET ORAL at 22:32

## 2022-07-12 RX ADMIN — OXYCODONE 5 MG: 5 TABLET ORAL at 03:12

## 2022-07-12 RX ADMIN — ASPIRIN 81 MG 81 MG: 81 TABLET ORAL at 08:58

## 2022-07-12 RX ADMIN — WHITE PETROLATUM,ZINC OXIDE: 20; 75 PASTE TOPICAL at 21:00

## 2022-07-12 RX ADMIN — INSULIN GLARGINE 80 UNITS: 100 INJECTION, SOLUTION SUBCUTANEOUS at 08:58

## 2022-07-12 RX ADMIN — WHITE PETROLATUM,ZINC OXIDE: 20; 75 PASTE TOPICAL at 12:39

## 2022-07-12 RX ADMIN — OXYCODONE 5 MG: 5 TABLET ORAL at 21:53

## 2022-07-12 RX ADMIN — CLONIDINE HYDROCHLORIDE 0.1 MG: 0.1 TABLET ORAL at 22:32

## 2022-07-12 RX ADMIN — COLCHICINE 0.6 MG: 0.6 TABLET, FILM COATED ORAL at 08:58

## 2022-07-12 RX ADMIN — OXYCODONE 5 MG: 5 TABLET ORAL at 12:41

## 2022-07-12 RX ADMIN — OXYCODONE HYDROCHLORIDE AND ACETAMINOPHEN 1000 MG: 500 TABLET ORAL at 08:58

## 2022-07-12 RX ADMIN — SODIUM CHLORIDE 100 ML/HR: 9 INJECTION, SOLUTION INTRAVENOUS at 17:37

## 2022-07-12 RX ADMIN — OXYCODONE 5 MG: 5 TABLET ORAL at 18:32

## 2022-07-12 RX ADMIN — FLUTICASONE PROPIONATE 2 SPRAY: 50 SPRAY, METERED NASAL at 12:38

## 2022-07-12 RX ADMIN — GABAPENTIN 300 MG: 300 CAPSULE ORAL at 08:58

## 2022-07-12 RX ADMIN — FERROUS SULFATE TAB 325 MG (65 MG ELEMENTAL FE) 325 MG: 325 (65 FE) TAB at 08:58

## 2022-07-12 RX ADMIN — BUMETANIDE 2 MG: 1 TABLET ORAL at 08:58

## 2022-07-12 RX ADMIN — GABAPENTIN 300 MG: 300 CAPSULE ORAL at 12:38

## 2022-07-12 RX ADMIN — SODIUM CHLORIDE 100 ML/HR: 9 INJECTION, SOLUTION INTRAVENOUS at 03:13

## 2022-07-12 RX ADMIN — BUMETANIDE 2 MG: 1 TABLET ORAL at 22:32

## 2022-07-12 RX ADMIN — HEPARIN SODIUM 5000 UNITS: 5000 INJECTION INTRAVENOUS; SUBCUTANEOUS at 05:52

## 2022-07-12 RX ADMIN — OXYCODONE 5 MG: 5 TABLET ORAL at 09:04

## 2022-07-12 RX ADMIN — HEPARIN SODIUM 5000 UNITS: 5000 INJECTION INTRAVENOUS; SUBCUTANEOUS at 22:33

## 2022-07-12 RX ADMIN — TIOTROPIUM BROMIDE INHALATION SPRAY 2 PUFF: 3.12 SPRAY, METERED RESPIRATORY (INHALATION) at 07:57

## 2022-07-12 RX ADMIN — GABAPENTIN 300 MG: 300 CAPSULE ORAL at 21:54

## 2022-07-12 RX ADMIN — INSULIN GLARGINE 34 UNITS: 100 INJECTION, SOLUTION SUBCUTANEOUS at 22:32

## 2022-07-12 RX ADMIN — CLONIDINE HYDROCHLORIDE 0.1 MG: 0.1 TABLET ORAL at 08:58

## 2022-07-12 RX ADMIN — FERROUS SULFATE TAB 325 MG (65 MG ELEMENTAL FE) 325 MG: 325 (65 FE) TAB at 12:38

## 2022-07-12 RX ADMIN — Medication 1 TABLET: at 09:59

## 2022-07-12 RX ADMIN — HEPARIN SODIUM 5000 UNITS: 5000 INJECTION INTRAVENOUS; SUBCUTANEOUS at 15:09

## 2022-07-12 RX ADMIN — GABAPENTIN 300 MG: 300 CAPSULE ORAL at 17:37

## 2022-07-12 RX ADMIN — TAMSULOSIN HYDROCHLORIDE 0.4 MG: 0.4 CAPSULE ORAL at 08:58

## 2022-07-12 NOTE — WOUND CARE
IP WOUND CONSULT    [address] RECORD NUMBER:  [id number]  AGE: 78 y.o. GENDER: male  : 1955  TODAY'S DATE:  2022    GENERAL     [] Follow-up   [x] New Consult    Juve Ibanez is a 78 y.o. male referred by:   [x] Physician  [] Nursing  [] Other:         PAST MEDICAL HISTORY    Past Medical History:   Diagnosis Date    Arthritis     CAD (coronary artery disease)     [facility]     Diabetes ([facility]     Gout     Hypertension     Ill-defined condition     Sleep apnea         PAST SURGICAL HISTORY    Past Surgical History:   Procedure Laterality Date    HX ORTHOPAEDIC      HX PACEMAKER      HX VEIN ABLATION ADHESIVE         FAMILY HISTORY    Family History   Problem Relation Age of Onset    Heart Disease Mother     Kidney Disease Mother     Hypertension Mother     Diabetes Mother     Heart Disease Father     Hypertension Father     Diabetes Sister     Diabetes Brother          ALLERGIES    Allergies   Allergen Reactions    Elavil Angioedema    Naproxen Unknown (comments)     Pt not sure of reaction    Sulfa (Sulfonamide Antibiotics) Nausea and Vomiting       MEDICATIONS    No current facility-administered medications on file prior to encounter. Current Outpatient Medications on File Prior to Encounter   Medication Sig Dispense Refill    insulin glargine,hum.rec.anlog (TOUJEO MAX U-300 SOLOSTAR SC) 80 Units by SubCUTAneous route two (2) times a day. 80 units in am, 34 units in pm      b complex vitamins tablet Take 1 Tablet by mouth daily.  cloNIDine HCL (CATAPRES) 0.1 mg tablet Take 1 Tablet by mouth every twelve (12) hours. (Patient not taking: Reported on 2022)      ammonium lactate (AMLACTIN) 12 % topical cream Apply  to affected area as needed for Other (xerosis).  rub in to affected area well  Indications: dry skin 280 g 0    vitamin E, dl,tocopheryl acet, (vitamin E acetate) 400 unit capsule Take 2 Capsules by mouth two (2) times a day. 100 Capsule 0    ascorbic acid, vitamin C, (Vitamin C) 500 mg tablet Take 1,000 mg by mouth two (2) times a day. Indications: inadequate vitamin C      tamsulosin (Flomax) 0.4 mg capsule Take 0.8 mg by mouth daily.  tiotropium (Spiriva with HandiHaler) 18 mcg inhalation capsule Take 1 Capsule by inhalation daily.  bumetanide (BUMEX) 2 mg tablet Take 2 mg by mouth two (2) times a day.  ferrous sulfate (Iron) 325 mg (65 mg iron) tablet Take  by mouth two (2) times daily (after meals).  aspirin 81 mg chewable tablet Take 81 mg by mouth daily.  flunisolide (NASAREL) 25 mcg (0.025 %) spry 2 Sprays two (2) times a day.  albuterol (ProAir HFA) 90 mcg/actuation inhaler Take 1 Puff by inhalation every four (4) hours as needed for Wheezing or Shortness of Breath.  colchicine 0.6 mg tablet Take 0.6 mg by mouth daily.  gabapentin (NEURONTIN) 300 mg capsule Take 300 mg by mouth four (4) times daily.  oxyCODONE IR (ROXICODONE) 5 mg immediate release tablet Take 5 mg by mouth every twelve (12) hours.  insulin lispro (HumaLOG U-100 Insulin) 100 unit/mL injection by SubCUTAneous route.  Sliding scale           [unfilled]  Visit Vitals  /65 (BP 1 Location: Left upper arm, BP Patient Position: At rest;Supine)   Pulse 82   Temp 98.4 °F (36.9 °C)   Resp 20   Ht 5' 11\" (1.803 m)   Wt 157.4 kg (347 lb)   SpO2 98%   BMI 48.40 kg/m²       ASSESSMENT     Wound Identification & Type: Venous stasis ulcers to RLE, LLE x2, and right heel; and MASD to abdominal fold center  Dressing change: Yes, see flow chart  Verbal consent for picture: yes    Contributing Factors: anticoagulation therapy, edema, venous stasis, diabetes, poor glucose control, decreased mobility, shear force, incontinence of urine and obesity    Wound Leg lower Left;Lateral #1 (Active)   Wound Image    07/12/22 1135   Wound Etiology Venous 07/12/22 1135   Dressing Status New dressing applied 07/12/22 1135   Cleansed Soap and water 07/12/22 1135   Dressing/Treatment Alginate with Ag;ABD pad;Roll gauze; Other (Comment) 07/12/22 1135   Dressing Change Due 07/15/22 07/12/22 1135   Wound Assessment Rickreall/red;Slough 07/12/22 1135   Drainage Amount Small 07/12/22 1135   Drainage Description Rickreall 07/12/22 1135   Wound Odor None 07/12/22 1135   Alissa-Wound/Incision Assessment Hypopigmented 07/12/22 1135   Edges Defined edges 07/12/22 1135   Wound Thickness Description Full thickness 07/12/22 1135   Number of days: 132       Wound Heel Right #2 (Active)   Wound Image   07/12/22 1133   Wound Etiology Diabetic 07/12/22 0307   Dressing Status New dressing applied 07/12/22 1133   Cleansed Soap and water 07/12/22 1133   Dressing/Treatment Alginate with Ag;Roll gauze; Other (Comment) 07/12/22 1133   Dressing Change Due 07/15/22 07/12/22 1133   Wound Length (cm) 0.5 cm 07/12/22 1133   Wound Width (cm) 0.6 cm 07/12/22 1133   Wound Depth (cm) 0.1 cm 07/12/22 1133   Wound Surface Area (cm^2) 0.3 cm^2 07/12/22 1133   Change in Wound Size % 93.14 07/12/22 1133   Wound Volume (cm^3) 0.03 cm^3 07/12/22 1133   Wound Healing % 93 07/12/22 1133   Wound Assessment Pink/red 07/12/22 1133   Drainage Amount None 07/12/22 1133   Wound Odor None 07/12/22 1133   Alissa-Wound/Incision Assessment Hypopigmented 07/12/22 1133   Edges Defined edges 07/12/22 1133   Wound Thickness Description Partial thickness 07/12/22 1133   Number of days: 111       Wound Leg Right #3 06/15/22 (Active)   Wound Image   07/12/22 1129   Wound Etiology Venous 07/12/22 1129   Dressing Status New dressing applied 07/12/22 1129   Cleansed Soap and water 07/12/22 1129   Dressing/Treatment Alginate with Ag;Moisturizing cream;Roll gauze; Other (Comment) 07/12/22 1129   Dressing Change Due 07/15/22 07/12/22 1129   Wound Assessment Dry 07/12/22 1129   Drainage Amount None 07/12/22 1129   Wound Odor None 07/12/22 1129   Alissa-Wound/Incision Assessment Hypopigmented 07/12/22 1129   Number of days: 27       Wound Abdomen Medial;Lower Partial Thickness 07/12/22 (Active)   Wound Image   07/12/22 1141   Wound Etiology Other (Comment) 07/12/22 1141   Cleansed Other (Comment) 07/12/22 1141   Dressing/Treatment Zinc paste 07/12/22 1141   Wound Assessment Pink/red;Denuded 07/12/22 1141   Drainage Amount None 07/12/22 1141   Wound Odor None 07/12/22 1141   Edges Unattached edges 07/12/22 1141   Wound Thickness Description Partial thickness 07/12/22 1141   Number of days: 0          PLAN     Skin Care & Pressure Relief Recommendations  Speciality bed Total Care Bariatric bed w/air  Minimize layers of linen  Turn/reposition approximately every 2 hours  Pillow wedges  Manage incontinence   Promote continence; Skin Protective lotion/cream to buttocks and sacrum daily and as needed with incontinence care  Offload heels pillows    Kumar Bailey  Blood Glucose: 111 on 7/12/22                             Albumin: 2.9 on 7/11/22  WBCs: 12.4 on 7/11/22    Support Surface: currently on gel mattress. Will transfer patient to Total [facility] Bariatric bed w/air for increased pressure reduction and climate control. Physician/Provider notified:   Recommendations: Patient is treated and followed by the [facility]. Venous stasis ulcers noted to RLE, LLE x2, and right heel. Wounds to right heel and RLE appear dry at this time, crust noted to RLE wound. Wounds to LLE have small amount of slough, cleaned with NS dampened gauze and majority of slough was removed. Opticell Ag and Profore Lite multi-layer wrap to be applied to areas three times weekly. Next dressing change will be for Friday 7/15/22 by [facility] if patient is still here. Elevate BLEs with 3-4 pillows each while in bed to help reduce/control edema. Patient's thighs noted to be edematous. PrimoFit and Quick Change Wrap changed due to leaking and incontinent care provided.   Applied Marathon XL Liquid Skin Protectant to buttocks and posterior to protect skin from breakdown related to  incontinence. Re-apply every three days. Apply Optifoam Border Sacral foam dressing daily to redistribute pressure from bony prominence and prevent pressure and friction injury to skin. Maintain the PrimoFit to manage  incontinence. Apply zinc paste BID and prn for soiling to abdominal fold for MASD and minor wound healing. Use foam wedge to turn q2h at 30 degree angle to offload sacrum. Float heels with 2-3 pillows while in bed for offloading of the heels. Maintain HOB at 30 degrees or less, if not contraindicated, to reduce pressure to buttocks and sacrum. Raise foot of bed to help prevent friction and shear injury from sliding down in the bed. Will continue to follow. Discharge Wound Care Needs: Follow up with the [address] [place] [address] for wound management.       Teaching completed with:   [] Patient           [] Family member       [] Caregiver          [] Nursing  [] Other    Patient/Caregiver Teaching:   Level of patient/caregiver understanding able to:   [] Indicates understanding       [] Needs reinforcement  [] Unsuccessful      [] Verbal Understanding  [] Demonstrated understanding       [] No evidence of learning  [] Refused teaching         [] N/A       Electronically signed by Jaya Schwartz RN on 7/12/2022 at 11:51 AM

## 2022-07-12 NOTE — CONSULTS
Renal    Patient's name is apparent a list, consult has not been formally called to me. Nonetheless, chart reviewed. It appears he may have passed a stone. Serum creatinine is elevated relatively speaking. I think he may benefit from some IV fluids. We will also get urine electrolytes and urine protein/creatinine ratio.   We will see in reyna Salamanca MD

## 2022-07-12 NOTE — CONSULTS
RENAL    Patient seen and examined. Acute azotemia resolving with volume. I suspect he passed a left ureteral stone. Back pain better, w/o nausea or vomiting. Hx of AODM poorly controlled. Has PVD with distal diabetic ulcers. No recent ophthalmology exam but does note decreased vision. Will check urine protein/creat ratio to stratify renal risk. Full note to follow.     Maggie Montenegro MD

## 2022-07-12 NOTE — PROGRESS NOTES
Problem: Falls - Risk of  Goal: *Absence of Falls  Description: Document Huey Haris Fall Risk and appropriate interventions in the flowsheet.   Outcome: Progressing Towards Goal  Note: Fall Risk Interventions:  Mobility Interventions: Bed/chair exit alarm,Patient to call before getting OOB         Medication Interventions: Bed/chair exit alarm,Patient to call before getting OOB    Elimination Interventions: Call light in reach,Patient to call for help with toileting needs              Problem: Patient Education: Go to Patient Education Activity  Goal: Patient/Family Education  Outcome: Progressing Towards Goal

## 2022-07-12 NOTE — PROGRESS NOTES
UROLOGY Progress Note         [phone]      Daily Progress Note: 7/12/2022      Subjective: The patient is seen for UROLOGIC follow up for hydronephrosis  Patient is a 71-year-old male with history of diabetes, high blood pressure COPD,CAD, kidney stones presented to the emergency room with left flank pain and darker than usual urine. He reported  stabbing left flank pain for 2 days with  Frequency, loss of urine control and   loss of appetite. He denied any gross haematuria,fever, chills,N/V  The patient seen at the bedside. He is alert and  oriented not in distress  He feels much better today. He still has  some residual left flank pain. 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 CT scan from 7/11/22  Reveled moderate left hydronephrosis and ureterectasis. No obstructing, radiopaque  left urinary calculus. This suggests a recently passed stone.     Labs: kidney function improved CR=1.91 from 2.53,BUN 35.  UA  Trace leuk    Problem List:  Patient Active Problem List   Diagnosis Code    Idiopathic chronic venous hypertension of left lower extremity with ulcer ([address] I87.312, L97.929    Localized edema R60.0    Type 2 diabetes mellitus with diabetic polyneuropathy, with long-term current use of insulin ([facility]) E11.42, Z79.4    Hyperkeratosis L85.9    Onychomycosis B35.1    COVID-19 U07.1    Cellulitis L03.90    Cellulitis and abscess of right leg L03.115, L02.415    Venous ulcer ([facility]) I83.009, L97.909    Non-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 ([address] E64.366    Ulcer of left foot, with fat layer exposed ([address] L97.522    Ulcer of right heel, with fat layer exposed ([address] L97.412    Calf ulcer, left, with fat layer exposed ([address] L97.222    Ankle ulcer, right, with fat layer exposed ([address] L97.312    Non-pressure chronic ulcer of other part of right lower leg with fat layer exposed ([address] L97.812    MAURICIO (acute kidney injury) ([facility] N17.9         Medications reviewed  Current Facility-Administered Medications   Medication Dose Route Frequency    oxyCODONE IR (ROXICODONE) tablet 5 mg  5 mg Oral Q6H PRN    zinc oxide-white petrolatum 20-75 % topical paste   Topical BID    albuterol (PROVENTIL HFA, VENTOLIN HFA, PROAIR HFA) inhaler 2 Puff  2 Puff Inhalation Q6H PRN    ascorbic acid (vitamin C) (VITAMIN C) tablet 1,000 mg  1,000 mg Oral BID    aspirin chewable tablet 81 mg  81 mg Oral DAILY    b complex-vitamin c-folic acid 5mg (FOLBEE PLUS) tablet 1 Tablet  1 Tablet Oral DAILY    bumetanide (BUMEX) tablet 2 mg  2 mg Oral BID    cloNIDine HCL (CATAPRES) tablet 0.1 mg  0.1 mg Oral Q12H    colchicine tablet 0.6 mg  0.6 mg Oral DAILY    ferrous sulfate tablet 325 mg  1 Tablet Oral TID WITH MEALS    fluticasone propionate (FLONASE) 50 mcg/actuation nasal spray 2 Spray  2 Spray Both Nostrils DAILY    gabapentin (NEURONTIN) capsule 300 mg  300 mg Oral QID    insulin glargine (LANTUS) injection 80 Units  80 Units SubCUTAneous DAILY    tamsulosin (FLOMAX) capsule 0.4 mg  0.4 mg Oral DAILY    tiotropium bromide (SPIRIVA RESPIMAT) 2.5 mcg /actuation  2 Puff Inhalation DAILY    insulin lispro (HUMALOG) injection   SubCUTAneous AC&HS    glucose chewable tablet 16 g  4 Tablet Oral PRN    glucagon (GLUCAGEN) injection 1 mg  1 mg IntraMUSCular PRN    acetaminophen (TYLENOL) tablet 650 mg  650 mg Oral Q6H PRN    Or    acetaminophen (TYLENOL) suppository 650 mg  650 mg Rectal Q6H PRN    polyethylene glycol (MIRALAX) packet 17 g  17 g Oral DAILY PRN    ondansetron (ZOFRAN ODT) tablet 4 mg  4 mg Oral Q8H PRN    Or    ondansetron (ZOFRAN) injection 4 mg  4 mg IntraVENous Q6H PRN    heparin (porcine) injection 5,000 Units  5,000 Units SubCUTAneous Q8H    insulin glargine (LANTUS) injection 34 Units  34 Units SubCUTAneous QHS    oxyCODONE IR (ROXICODONE) tablet 5 mg  5 mg Oral Q12H    0.9% sodium chloride infusion  100 mL/hr IntraVENous CONTINUOUS       Review of Systems:   Review of Systems   Constitutional: Negative. HENT: Negative. Eyes: Negative. Respiratory:        On O2   Cardiovascular: Negative. Gastrointestinal: Negative. Genitourinary: Positive for flank pain. Skin: Negative. Neurological: Negative. Endo/Heme/Allergies: Negative. Psychiatric/Behavioral: Negative. Objective:   Physical Exam  HENT:      Head: Normocephalic. Mouth/Throat:      Mouth: Mucous membranes are moist.   Eyes:      Pupils: Pupils are equal, round, and reactive to light. Cardiovascular:      Rate and Rhythm: Normal rate. Pulmonary:      Effort: Pulmonary effort is normal.   Abdominal:      General: Bowel sounds are normal.   Genitourinary:     Comments: Buried penis  Musculoskeletal:      Right lower leg: Edema present. Left lower leg: Edema present. Skin:     General: Skin is warm. Capillary Refill: Capillary refill takes less than 2 seconds. Comments: Bilateral lower extremities ulcers   Neurological:      General: No focal deficit present. Mental Status: He is alert.    Psychiatric:         Mood and Affect: Mood normal.          Visit Vitals  /65 (BP 1 Location: Left upper arm, BP Patient Position: At rest;Supine)   Pulse 82   Temp 98.4 °F (36.9 °C)   Resp 20   Ht 5' 11\" (1.803 m)   Wt 347 lb (157.4 kg)   SpO2 98%   BMI 48.40 kg/m²         Data Review:       Recent Days:  Recent Labs     07/12/22  0733 07/11/22  0950   WBC 7.4 12.4*   HGB 10.5* 11.5*   HCT 33.1* 36.7    204     Recent Labs     07/12/22  0733 07/11/22  0950    139   K 3.5 3.8    101   CO2 31 31   * 125*   BUN 35* 37*   CREA 1.91* 2.53*   CA 8.4* 8.5   ALB 2.4* 2.9*   TBILI 1.5* 1.4*   ALT 20 22       24 Hour Results:  Recent Results (from the past 24 hour(s))   GLUCOSE, POC    Collection Time: 07/11/22  4:36 PM   Result Value Ref Range    Glucose (POC) 145 (H) 65 - 117 mg/dL    Performed by Shola Fam    COVID-19 RAPID TEST    Collection Time: 07/11/22  6:30 PM   Result Value Ref Range    COVID-19 rapid test Not Detected Not Detected     GLUCOSE, POC    Collection Time: 07/11/22  9:32 PM   Result Value Ref Range    Glucose (POC) 160 (H) 65 - 117 mg/dL    Performed by Saintclair Kelly    MRSA SCREEN - PCR (NASAL)    Collection Time: 07/12/22  1:50 AM   Result Value Ref Range    MRSA by PCR, Nasal Not Detected Not Detected     PROTEIN URINE, RANDOM    Collection Time: 07/12/22  3:15 AM   Result Value Ref Range    Protein, urine random 22 (H) 0.0 - 11.9 mg/dL   POTASSIUM, UR, RANDOM    Collection Time: 07/12/22  3:15 AM   Result Value Ref Range    Potassium urine, random 16 mmol/L   PROTEIN/CREATININE RATIO, URINE    Collection Time: 07/12/22  3:15 AM   Result Value Ref Range    Protein, urine random 22 (H) 0.0 - 11.9 mg/dL    Creatinine, urine 45.00 mg/dL    Protein/Creat. urine Ratio 0.5     CREATININE, UR, RANDOM    Collection Time: 07/12/22  3:15 AM   Result Value Ref Range    Creatinine, urine 44.00 mg/dL   SODIUM, UR, RANDOM    Collection Time: 07/12/22  3:15 AM   Result Value Ref Range    Sodium,urine random 56 mmol/L   METABOLIC PANEL, COMPREHENSIVE    Collection Time: 07/12/22  7:33 AM   Result Value Ref Range    Sodium 141 136 - 145 mmol/L    Potassium 3.5 3.5 - 5.1 mmol/L    Chloride 104 97 - 108 mmol/L    CO2 31 21 - 32 mmol/L    Anion gap 6 5 - 15 mmol/L    Glucose 132 (H) 65 - 100 mg/dL    BUN 35 (H) 6 - 20 mg/dL    Creatinine 1.91 (H) 0.70 - 1.30 mg/dL    BUN/Creatinine ratio 18 12 - 20      GFR est AA 43 (L) >60 ml/min/1.73m2    GFR est non-AA 35 (L) >60 ml/min/1.73m2    Calcium 8.4 (L) 8.5 - 10.1 mg/dL    Bilirubin, total 1.5 (H) 0.2 - 1.0 mg/dL    AST (SGOT) 22 15 - 37 U/L    ALT (SGPT) 20 12 - 78 U/L    Alk.  phosphatase 83 45 - 117 U/L    Protein, total 5.6 (L) 6.4 - 8.2 g/dL    Albumin 2.4 (L) 3.5 - 5.0 g/dL    Globulin 3.2 2.0 - 4.0 g/dL    A-G Ratio 0.8 (L) 1.1 - 2.2     CBC WITH AUTOMATED DIFF    Collection Time: 07/12/22  7:33 AM   Result Value Ref Range    WBC 7.4 4.1 - 11.1 K/uL    RBC 3.41 (L) 4.10 - 5.70 M/uL    HGB 10.5 (L) 12.1 - 17.0 g/dL    HCT 33.1 (L) 36.6 - 50.3 %    MCV 97.1 80.0 - 99.0 FL    MCH 30.8 26.0 - 34.0 PG    MCHC 31.7 30.0 - 36.5 g/dL    RDW 13.3 11.5 - 14.5 %    PLATELET 042 753 - 953 K/uL    MPV 11.5 8.9 - 12.9 FL    NRBC 0.0 0.0  WBC    ABSOLUTE NRBC 0.00 0.00 - 0.01 K/uL    NEUTROPHILS 70 32 - 75 %    LYMPHOCYTES 13 12 - 49 %    MONOCYTES 9 5 - 13 %    EOSINOPHILS 8 (H) 0 - 7 %    BASOPHILS 0 0 - 1 %    IMMATURE GRANULOCYTES 0 0 - 0.5 %    ABS. NEUTROPHILS 5.1 1.8 - 8.0 K/UL    ABS. LYMPHOCYTES 1.0 0.8 - 3.5 K/UL    ABS. MONOCYTES 0.7 0.0 - 1.0 K/UL    ABS. EOSINOPHILS 0.6 (H) 0.0 - 0.4 K/UL    ABS. BASOPHILS 0.0 0.0 - 0.1 K/UL    ABS. IMM.  GRANS. 0.0 0.00 - 0.04 K/UL    DF AUTOMATED     GLUCOSE, POC    Collection Time: 07/12/22  8:25 AM   Result Value Ref Range    Glucose (POC) 111 65 - 117 mg/dL    Performed by [address] [place] [address], POC    Collection Time: 07/12/22 11:44 AM   Result Value Ref Range    Glucose (POC) 128 (H) 65 - 117 mg/dL    Performed by Lety Oswald            Assessment/     Patient Active Problem List   Diagnosis Code    Idiopathic chronic venous hypertension of left lower extremity with ulcer ([facility] I87.312, L97.929    Localized edema R60.0    Type 2 diabetes mellitus with diabetic polyneuropathy, with long-term current use of insulin ([facility]) E11.42, Z79.4    Hyperkeratosis L85.9    Onychomycosis B35.1    COVID-19 U07.1    Cellulitis L03.90    Cellulitis and abscess of right leg L03.115, L02.415    Venous ulcer ([facility]) I83.009, L97.909    Non-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 ([address] R98.899    Ulcer of left foot, with fat layer exposed ([address] L97.522    Ulcer of right heel, with fat layer exposed ([address] L97.412    Calf ulcer, left, with fat layer exposed ([address] L97.222    Ankle ulcer, right, with fat layer exposed ([address] L97.312    Non-pressure chronic ulcer of other part of right lower leg with fat layer exposed ([address] L97.812    MAURICIO (acute kidney injury) ([address] N17.9       Plan:  1. Hydronephrosis   Ct scan:  Reveled moderate left hydronephrosis and ureterectasis. No obstructing, radiopaque  left urinary calculus. This suggests a recently passed stone. Needs a stone work up, follow with Dr. Moses Patel outpatient  2. MAURICIO   kidney function improved CR=1.91 from 2.53,BUN 35.  UA  Trace leuk  3. Buried penis  Follow up after discharge with Dr. Viktoriya Joshi discussed with: Dr. Vinita Serrano, Attending Physician  It would be nice to know what type of stones were dealing with.   Obviously make a difference the uric acid versus calcium oxalate    Makeda Castillo, GRAEME

## 2022-07-12 NOTE — H&P
History and Physical    NAME: Dionte Cotton   :  1955   MRN:  [id number]     Date/Time:  2022 10:46 AM    Patient PCP: Aileen Lua MD  ______________________________________________________________________             Subjective:     CHIEF COMPLAINT: Flank and chest pain        HISTORY OF PRESENT ILLNESS:       Patient is a 78y.o. year old male PMH MORBID OBESITY DM, HTN, COPD, deep apnea chronic venous stasis ulcers more in the left leg than right CAD, and recent MAURICIO who presents with flank and chest pain. Patient reports bilateral flank pain. He has a history of chronic back pain, but states this feels different. He is urinating less than normal and it is dark in color. He reports incontinence. He had a recent hospitalization for MAURICIO. He states he had left sided abdominal pain which has now resolved. Denies fever, shortness of breath, nausea, vomiting. BUN: 35  Cr: 1.91  GFR: 35  PVR: 53    CT ABD PELV WO CONT  1. Moderate left hydronephrosis and ureterectasis. No obstructing, radiopaque,  left urinary calculus. This suggests a recently passed stone. 2. Cirrhosis and portal hypertension. No ascites. Liver protocol CT or MRI is recommended on a nonemergent basis for hepatocellular carcinoma screening.     Past Medical History:   Diagnosis Date    Arthritis     CAD (coronary artery disease)     [facility] obstructive pulmonary disease [facility]     Diabetes ([facility] [address] [facility]     Gout     Hypertension     Ill-defined condition     Sleep apnea         Past Surgical History:   Procedure Laterality Date    HX ORTHOPAEDIC      HX PACEMAKER      HX VEIN ABLATION ADHESIVE         Social History     Tobacco Use    Smoking status: Never Smoker    Smokeless tobacco: Never Used   Substance Use Topics    Alcohol use: Not Currently        Family History   Problem Relation Age of Onset    Heart Disease Mother     Kidney Disease Mother     Hypertension Mother     Diabetes Mother     Heart Disease Father  Hypertension Father     Diabetes Sister     Diabetes Brother        Allergies   Allergen Reactions    Elavil Angioedema    Naproxen Unknown (comments)     Pt not sure of reaction    Sulfa (Sulfonamide Antibiotics) Nausea and Vomiting        Prior to Admission medications    Medication Sig Start Date End Date Taking? Authorizing Provider   insulin glargine,hum.rec.anlog (TOUJEO MAX U-300 SOLOSTAR SC) 80 Units by SubCUTAneous route two (2) times a day. 80 units in am, 34 units in pm    Provider, Historical   b complex vitamins tablet Take 1 Tablet by mouth daily. Provider, Historical   cloNIDine HCL (CATAPRES) 0.1 mg tablet Take 1 Tablet by mouth every twelve (12) hours. Patient not taking: Reported on 7/12/2022 5/27/21   Provider, Historical   ammonium lactate (AMLACTIN) 12 % topical cream Apply  to affected area as needed for Other (xerosis). rub in to affected area well  Indications: dry skin 6/8/21   Wali Patel MD   vitamin E, dl,tocopheryl acet, (vitamin E acetate) 400 unit capsule Take 2 Capsules by mouth two (2) times a day. 6/8/21   Wali Patel MD   ascorbic acid, vitamin C, (Vitamin C) 500 mg tablet Take 1,000 mg by mouth two (2) times a day. Indications: inadequate vitamin C    Provider, Historical   tamsulosin (Flomax) 0.4 mg capsule Take 0.8 mg by mouth daily. Provider, Historical   tiotropium (Spiriva with HandiHaler) 18 mcg inhalation capsule Take 1 Capsule by inhalation daily. Provider, Historical   bumetanide (BUMEX) 2 mg tablet Take 2 mg by mouth two (2) times a day. Provider, Historical   ferrous sulfate (Iron) 325 mg (65 mg iron) tablet Take  by mouth two (2) times daily (after meals). Provider, Historical   aspirin 81 mg chewable tablet Take 81 mg by mouth daily. Provider, Historical   flunisolide (NASAREL) 25 mcg (0.025 %) spry 2 Sprays two (2) times a day.     Provider, Historical   albuterol (ProAir HFA) 90 mcg/actuation inhaler Take 1 Puff by inhalation every four (4) hours as needed for Wheezing or Shortness of Breath. Provider, Historical   colchicine 0.6 mg tablet Take 0.6 mg by mouth daily. Provider, Historical   gabapentin (NEURONTIN) 300 mg capsule Take 300 mg by mouth four (4) times daily. Provider, Historical   oxyCODONE IR (ROXICODONE) 5 mg immediate release tablet Take 5 mg by mouth every twelve (12) hours. Provider, Historical   insulin lispro (HumaLOG U-100 Insulin) 100 unit/mL injection by SubCUTAneous route.  Sliding scale    Provider, Historical         Current Facility-Administered Medications:     oxyCODONE IR (ROXICODONE) tablet 5 mg, 5 mg, Oral, Q6H PRN, Moni Patel MD, 5 mg at 07/12/22 6566    zinc oxide-white petrolatum 20-75 % topical paste, , Topical, BID, Wali Patel MD    albuterol (PROVENTIL HFA, VENTOLIN HFA, PROAIR HFA) inhaler 2 Puff, 2 Puff, Inhalation, Q6H PRN, Moni Patel MD    ascorbic acid (vitamin C) (VITAMIN C) tablet 1,000 mg, 1,000 mg, Oral, BID, Moni Patel MD, 1,000 mg at 07/12/22 6773    aspirin chewable tablet 81 mg, 81 mg, Oral, DAILY, Moni Patel MD, 81 mg at 07/12/22 0858    b complex-vitamin c-folic acid 5mg (FOLBEE PLUS) tablet 1 Tablet, 1 Tablet, Oral, DAILY, Moni Patel MD, 1 Tablet at 07/12/22 0959    bumetanide (BUMEX) tablet 2 mg, 2 mg, Oral, BID, Moni Patel MD, 2 mg at 07/12/22 0858    cloNIDine HCL (CATAPRES) tablet 0.1 mg, 0.1 mg, Oral, Q12H, Moni Patel MD, 0.1 mg at 07/12/22 7791    colchicine tablet 0.6 mg, 0.6 mg, Oral, DAILY, Moni Patel MD, 0.6 mg at 07/12/22 1030    ferrous sulfate tablet 325 mg, 1 Tablet, Oral, TID WITH MEALS, Moni Patel MD, 325 mg at 07/12/22 0858    fluticasone propionate (FLONASE) 50 mcg/actuation nasal spray 2 Spray, 2 Spray, Both Nostrils, DAILY, Moni Patel MD    gabapentin (NEURONTIN) capsule 300 mg, 300 mg, Oral, QID, Moni Patel MD, 300 mg at 07/12/22 0858    insulin glargine (LANTUS) injection 80 Units, 80 Units, SubCUTAneous, DAILY, Moni Patel MD, 80 Units at 07/12/22 0858    tamsulosin (FLOMAX) capsule 0.4 mg, 0.4 mg, Oral, DAILY, Marisela Patel MD, 0.4 mg at 07/12/22 0858    tiotropium bromide (SPIRIVA RESPIMAT) 2.5 mcg /actuation, 2 Puff, Inhalation, DAILY, Moni Patel MD, 2 Puff at 07/12/22 0757    insulin lispro (HUMALOG) injection, , SubCUTAneous, AC&HS, Maren Wiley MD, 3 Units at 07/11/22 1641    glucose chewable tablet 16 g, 4 Tablet, Oral, PRN, Marisela Patel MD    glucagon (GLUCAGEN) injection 1 mg, 1 mg, IntraMUSCular, PRN, Marisela Patel MD    acetaminophen (TYLENOL) tablet 650 mg, 650 mg, Oral, Q6H PRN, 650 mg at 07/11/22 1639 **OR** acetaminophen (TYLENOL) suppository 650 mg, 650 mg, Rectal, Q6H PRN, Marisela Patel MD    polyethylene glycol (MIRALAX) packet 17 g, 17 g, Oral, DAILY PRN, Marisela Patel MD    ondansetron (ZOFRAN ODT) tablet 4 mg, 4 mg, Oral, Q8H PRN **OR** ondansetron (ZOFRAN) injection 4 mg, 4 mg, IntraVENous, Q6H PRN, Moni Patel MD    heparin (porcine) injection 5,000 Units, 5,000 Units, SubCUTAneous, Q8H, Moni Patel MD, 5,000 Units at 07/12/22 0552    insulin glargine (LANTUS) injection 34 Units, 34 Units, SubCUTAneous, QHS, Moni Patel MD, 34 Units at 07/11/22 2201    oxyCODONE IR (ROXICODONE) tablet 5 mg, 5 mg, Oral, Q12H, Moni Patel MD, 5 mg at 07/12/22 0904    0.9% sodium chloride infusion, 100 mL/hr, IntraVENous, CONTINUOUS, Maury Carson MD, Last Rate: 100 mL/hr at 07/12/22 0313, 100 mL/hr at 07/12/22 0313    LAB DATA REVIEWED:    Recent Results (from the past 24 hour(s))   GLUCOSE, POC    Collection Time: 07/11/22  4:36 PM   Result Value Ref Range    Glucose (POC) 145 (H) 65 - 117 mg/dL    Performed by Juan Woodward    COVID-19 RAPID TEST    Collection Time: 07/11/22  6:30 PM   Result Value Ref Range    COVID-19 rapid test Not Detected Not Detected     GLUCOSE, POC Collection Time: 07/11/22  9:32 PM   Result Value Ref Range    Glucose (POC) 160 (H) 65 - 117 mg/dL    Performed by Nyla Sahara    MRSA SCREEN - PCR (NASAL)    Collection Time: 07/12/22  1:50 AM   Result Value Ref Range    MRSA by PCR, Nasal Not Detected Not Detected     PROTEIN URINE, RANDOM    Collection Time: 07/12/22  3:15 AM   Result Value Ref Range    Protein, urine random 22 (H) 0.0 - 11.9 mg/dL   POTASSIUM, UR, RANDOM    Collection Time: 07/12/22  3:15 AM   Result Value Ref Range    Potassium urine, random 16 mmol/L   PROTEIN/CREATININE RATIO, URINE    Collection Time: 07/12/22  3:15 AM   Result Value Ref Range    Protein, urine random 22 (H) 0.0 - 11.9 mg/dL    Creatinine, urine 45.00 mg/dL    Protein/Creat. urine Ratio 0.5     CREATININE, UR, RANDOM    Collection Time: 07/12/22  3:15 AM   Result Value Ref Range    Creatinine, urine 44.00 mg/dL   SODIUM, UR, RANDOM    Collection Time: 07/12/22  3:15 AM   Result Value Ref Range    Sodium,urine random 56 mmol/L   METABOLIC PANEL, COMPREHENSIVE    Collection Time: 07/12/22  7:33 AM   Result Value Ref Range    Sodium 141 136 - 145 mmol/L    Potassium 3.5 3.5 - 5.1 mmol/L    Chloride 104 97 - 108 mmol/L    CO2 31 21 - 32 mmol/L    Anion gap 6 5 - 15 mmol/L    Glucose 132 (H) 65 - 100 mg/dL    BUN 35 (H) 6 - 20 mg/dL    Creatinine 1.91 (H) 0.70 - 1.30 mg/dL    BUN/Creatinine ratio 18 12 - 20      GFR est AA 43 (L) >60 ml/min/1.73m2    GFR est non-AA 35 (L) >60 ml/min/1.73m2    Calcium 8.4 (L) 8.5 - 10.1 mg/dL    Bilirubin, total 1.5 (H) 0.2 - 1.0 mg/dL    AST (SGOT) 22 15 - 37 U/L    ALT (SGPT) 20 12 - 78 U/L    Alk.  phosphatase 83 45 - 117 U/L    Protein, total 5.6 (L) 6.4 - 8.2 g/dL    Albumin 2.4 (L) 3.5 - 5.0 g/dL    Globulin 3.2 2.0 - 4.0 g/dL    A-G Ratio 0.8 (L) 1.1 - 2.2     CBC WITH AUTOMATED DIFF    Collection Time: 07/12/22  7:33 AM   Result Value Ref Range    WBC 7.4 4.1 - 11.1 K/uL    RBC 3.41 (L) 4.10 - 5.70 M/uL    HGB 10.5 (L) 12.1 - 17.0 g/dL HCT 33.1 (L) 36.6 - 50.3 %    MCV 97.1 80.0 - 99.0 FL    MCH 30.8 26.0 - 34.0 PG    MCHC 31.7 30.0 - 36.5 g/dL    RDW 13.3 11.5 - 14.5 %    PLATELET 518 470 - 192 K/uL    MPV 11.5 8.9 - 12.9 FL    NRBC 0.0 0.0  WBC    ABSOLUTE NRBC 0.00 0.00 - 0.01 K/uL    NEUTROPHILS 70 32 - 75 %    LYMPHOCYTES 13 12 - 49 %    MONOCYTES 9 5 - 13 %    EOSINOPHILS 8 (H) 0 - 7 %    BASOPHILS 0 0 - 1 %    IMMATURE GRANULOCYTES 0 0 - 0.5 %    ABS. NEUTROPHILS 5.1 1.8 - 8.0 K/UL    ABS. LYMPHOCYTES 1.0 0.8 - 3.5 K/UL    ABS. MONOCYTES 0.7 0.0 - 1.0 K/UL    ABS. EOSINOPHILS 0.6 (H) 0.0 - 0.4 K/UL    ABS. BASOPHILS 0.0 0.0 - 0.1 K/UL    ABS. IMM. GRANS. 0.0 0.00 - 0.04 K/UL    DF AUTOMATED     GLUCOSE, POC    Collection Time: 07/12/22  8:25 AM   Result Value Ref Range    Glucose (POC) 111 65 - 117 mg/dL    Performed by [address] [place] [address], POC    Collection Time: 07/12/22 11:44 AM   Result Value Ref Range    Glucose (POC) 128 (H) 65 - 117 mg/dL    Performed by [facility]        XR Results (most recent):  Results from Hospital Encounter encounter on 07/11/22    XR CHEST PORT    Narrative  Indication: Chest pain    Comparison: 6/9/2021    Portable exam of the chest obtained at 956 demonstrates normal heart size. Pacer  leads are unchanged in position. There is no acute process in the lung fields. The osseous structures are unremarkable. Impression  No acute process. CT ABD PELV WO CONT   Final Result   1. Moderate left hydronephrosis and ureterectasis. No obstructing, radiopaque,   left urinary calculus. This suggests a recently passed stone. 2. Cirrhosis and portal hypertension. No ascites. Liver protocol CT or MRI is   recommended on a nonemergent basis for hepatocellular carcinoma screening. US RETROPERITONEUM COMP   Final Result   No right hydronephrosis. Nondiagnostic imaging of the left kidney. XR CHEST PORT   Final Result   No acute process.               Review of Systems:  Constitutional: Negative for chills and fever. HENT: Negative. Eyes: Negative. Respiratory: Negative. Cardiovascular: Negative. Gastrointestinal: Negative for abdominal pain and nausea. Genitourinary: Positive for flank pain. Negative for dysuria  Skin: Negative. Neurological: Negative. Objective:   VITALS:    Visit Vitals  /65 (BP 1 Location: Left upper arm, BP Patient Position: At rest;Supine)   Pulse 82   Temp 98.4 °F (36.9 °C)   Resp 20   Ht 5' 11\" (1.803 m)   Wt 157.4 kg (347 lb)   SpO2 98%   BMI 48.40 kg/m²       Physical Exam:   Constitutional: pt is oriented to person, place, and time. Pt is obese   HENT:   Head: Normocephalic and atraumatic. Eyes: Pupils are equal, round, and reactive to light. EOM are normal.   Cardiovascular: Normal rate, regular rhythm and normal heart sounds. Pulmonary/Chest: Breath sounds normal. No wheezes. No rales. Exhibits no tenderness. Abdominal: Soft. Bowel sounds are normal. There is no abdominal tenderness. There is no rebound and no guarding. There is bilateral CVA tenderness. Musculoskeletal: Normal range of motion. Neurological: pt is alert and oriented to person, place, and time. Alert. Normal strength. No cranial nerve deficit or sensory deficit. Displays a negative Romberg sign. Skin: Bilateral lower leg ulcers. ASSESSMENT:  Acute kidney injury   Left  hydronephrosis  Possible hx nephrolithiasis  Diabetic Ulcers, bilaterally  Chronic venous stasis  Leg wounds  History of CHF, diastolic dysfunction   Image quality: suboptimal. The view(s) performed were parasternal, apical, subcostal and suprasternal. Color flow Doppler was performed and pulse wave and/or continuous wave Doppler was performed. No contrast was given. 1. This was a technically difficult study  2. The aortic and tricuspid valve were unable to be visualized. 3. Multisegment wall motion abnormality detected on this study. The overall EF was 30-35%.   4. No pericardial effusion seen on this study.   COPD  Type 2 DM  HTN  Gout  Sleep apnea  Morbid obesity    PLAN:  IV fluids  Monitor serum Cr  Consult nephrology  Continue to monitor patient     Continue meds:  ASA 81 mg po daily  Clonidine 0.1mg BID  Bumetanide 2 mg BID  Colchicine 0.6 mg po daily  Ferrous sulfate 325 mg TID  Flonase 2 puffs daily  Gabapentin 300 mg QID  Heparin 5,000 units s2ccnyz  Lantus 34 units subq qHS  Lantus 80 units subq daily  Humalog subq QID, QHS  Oxycodone 5 mg BID  Flomax 0.4 mg po daily  Spiriva 2 puffs daily    Monitor renal function  Patient seen by the wound care nurse  Urology was consulted                    ________________________________________________________________________    Signed: Dank Steward MD

## 2022-07-12 NOTE — H&P
History and Physical    NAME: Nga Mcclure   :  1955   MRN:  [id number]     Date/Time:  2022 10:46 AM    Patient PCP: Hope Garcia MD  ______________________________________________________________________             Subjective:     CHIEF COMPLAINT: Flank and chest pain        HISTORY OF PRESENT ILLNESS:       Patient is a 78y.o. year old male PMH DM, HTN, COPD, CAD, and recent MAURICIO who presents with flank and chest pain. Patient reports bilateral flank pain. He has a history of chronic back pain, but states this feels different. He is urinating less than normal and it is dark in color. He reports incontinence. He had a recent hospitalization for MAURICIO. He states he had left sided abdominal pain which has now resolved. Denies fever, shortness of breath, nausea, vomiting. BUN: 35  Cr: 1.91  GFR: 35  PVR: 53    CT ABD PELV WO CONT  1. Moderate left hydronephrosis and ureterectasis. No obstructing, radiopaque,  left urinary calculus. This suggests a recently passed stone. 2. Cirrhosis and portal hypertension. No ascites. Liver protocol CT or MRI is recommended on a nonemergent basis for hepatocellular carcinoma screening.     Past Medical History:   Diagnosis Date    Arthritis     CAD (coronary artery disease)     [facility] obstructive pulmonary disease ([facility] [address]     Diabetes ([facility] [address]     Gout     Hypertension     Ill-defined condition     Sleep apnea         Past Surgical History:   Procedure Laterality Date    HX ORTHOPAEDIC      HX PACEMAKER      HX VEIN ABLATION ADHESIVE         Social History     Tobacco Use    Smoking status: Never Smoker    Smokeless tobacco: Never Used   Substance Use Topics    Alcohol use: Not Currently        Family History   Problem Relation Age of Onset    Heart Disease Mother     Kidney Disease Mother     Hypertension Mother     Diabetes Mother     Heart Disease Father     Hypertension Father     Diabetes Sister     Diabetes Brother        Allergies Allergen Reactions    Elavil Angioedema    Naproxen Unknown (comments)     Pt not sure of reaction    Sulfa (Sulfonamide Antibiotics) Nausea and Vomiting        Prior to Admission medications    Medication Sig Start Date End Date Taking? Authorizing Provider   insulin glargine,hum.rec.anlog (TOUJEO MAX U-300 SOLOSTAR SC) 80 Units by SubCUTAneous route two (2) times a day. 80 units in am, 34 units in pm    Provider, Historical   b complex vitamins tablet Take 1 Tablet by mouth daily. Provider, Historical   cloNIDine HCL (CATAPRES) 0.1 mg tablet Take 1 Tablet by mouth every twelve (12) hours. Patient not taking: Reported on 7/12/2022 5/27/21   Provider, Historical   ammonium lactate (AMLACTIN) 12 % topical cream Apply  to affected area as needed for Other (xerosis). rub in to affected area well  Indications: dry skin 6/8/21   Kishor Patel MD   vitamin E, dl,tocopheryl acet, (vitamin E acetate) 400 unit capsule Take 2 Capsules by mouth two (2) times a day. 6/8/21   Kishor Patel MD   ascorbic acid, vitamin C, (Vitamin C) 500 mg tablet Take 1,000 mg by mouth two (2) times a day. Indications: inadequate vitamin C    Provider, Historical   tamsulosin (Flomax) 0.4 mg capsule Take 0.8 mg by mouth daily. Provider, Historical   tiotropium (Spiriva with HandiHaler) 18 mcg inhalation capsule Take 1 Capsule by inhalation daily. Provider, Historical   bumetanide (BUMEX) 2 mg tablet Take 2 mg by mouth two (2) times a day. Provider, Historical   ferrous sulfate (Iron) 325 mg (65 mg iron) tablet Take  by mouth two (2) times daily (after meals). Provider, Historical   aspirin 81 mg chewable tablet Take 81 mg by mouth daily. Provider, Historical   flunisolide (NASAREL) 25 mcg (0.025 %) spry 2 Sprays two (2) times a day. Provider, Historical   albuterol (ProAir HFA) 90 mcg/actuation inhaler Take 1 Puff by inhalation every four (4) hours as needed for Wheezing or Shortness of Breath.     Provider, Historical   colchicine 0.6 mg tablet Take 0.6 mg by mouth daily. Provider, Historical   gabapentin (NEURONTIN) 300 mg capsule Take 300 mg by mouth four (4) times daily. Provider, Historical   oxyCODONE IR (ROXICODONE) 5 mg immediate release tablet Take 5 mg by mouth every twelve (12) hours. Provider, Historical   insulin lispro (HumaLOG U-100 Insulin) 100 unit/mL injection by SubCUTAneous route.  Sliding scale    Provider, Historical         Current Facility-Administered Medications:     oxyCODONE IR (ROXICODONE) tablet 5 mg, 5 mg, Oral, Q6H PRN, Moni Patel MD, 5 mg at 07/12/22 0312    albuterol (PROVENTIL HFA, VENTOLIN HFA, PROAIR HFA) inhaler 2 Puff, 2 Puff, Inhalation, Q6H PRN, Moni Patel MD    ascorbic acid (vitamin C) (VITAMIN C) tablet 1,000 mg, 1,000 mg, Oral, BID, Moni Patel MD, 1,000 mg at 07/12/22 5354    aspirin chewable tablet 81 mg, 81 mg, Oral, DAILY, Moni Patel MD, 81 mg at 07/12/22 0858    b complex-vitamin c-folic acid 5mg (FOLBEE PLUS) tablet 1 Tablet, 1 Tablet, Oral, DAILY, Moni Patel MD, 1 Tablet at 07/12/22 0959    bumetanide (BUMEX) tablet 2 mg, 2 mg, Oral, BID, Moni Patel MD, 2 mg at 07/12/22 0858    cloNIDine HCL (CATAPRES) tablet 0.1 mg, 0.1 mg, Oral, Q12H, Moni Patel MD, 0.1 mg at 07/12/22 9257    colchicine tablet 0.6 mg, 0.6 mg, Oral, DAILY, Moni Patel MD, 0.6 mg at 07/12/22 1649    ferrous sulfate tablet 325 mg, 1 Tablet, Oral, TID WITH MEALS, Moni Patel MD, 325 mg at 07/12/22 0858    fluticasone propionate (FLONASE) 50 mcg/actuation nasal spray 2 Spray, 2 Spray, Both Nostrils, DAILY, Moni Patel MD    gabapentin (NEURONTIN) capsule 300 mg, 300 mg, Oral, QID, Moni Patel MD, 300 mg at 07/12/22 0858    insulin glargine (LANTUS) injection 80 Units, 80 Units, SubCUTAneous, DAILY, Moni Patel MD, 80 Units at 07/12/22 0858    tamsulosin (FLOMAX) capsule 0.4 mg, 0.4 mg, Oral, DAILY, Sherry Guerrero MD, 0.4 mg at 07/12/22 0858    tiotropium bromide (SPIRIVA RESPIMAT) 2.5 mcg /actuation, 2 Puff, Inhalation, DAILY, Sherry Guerrreo MD, 2 Puff at 07/12/22 0757    insulin lispro (HUMALOG) injection, , SubCUTAneous, AC&HS, Sherry Guerrero MD, 3 Units at 07/11/22 1641    glucose chewable tablet 16 g, 4 Tablet, Oral, PRN, Mohiuddin, Romana Dallas, MD    glucagon (GLUCAGEN) injection 1 mg, 1 mg, IntraMUSCular, PRN, Mohiuddin, Romana Dallas, MD    acetaminophen (TYLENOL) tablet 650 mg, 650 mg, Oral, Q6H PRN, 650 mg at 07/11/22 1639 **OR** acetaminophen (TYLENOL) suppository 650 mg, 650 mg, Rectal, Q6H PRN, Mohiuddin, Romana Dallas, MD    polyethylene glycol (MIRALAX) packet 17 g, 17 g, Oral, DAILY PRN, Mohiuddin, Romana Dallas, MD    ondansetron (ZOFRAN ODT) tablet 4 mg, 4 mg, Oral, Q8H PRN **OR** ondansetron (ZOFRAN) injection 4 mg, 4 mg, IntraVENous, Q6H PRN, Moni Patel MD    heparin (porcine) injection 5,000 Units, 5,000 Units, SubCUTAneous, Q8H, Moni Patel MD, 5,000 Units at 07/12/22 0552    insulin glargine (LANTUS) injection 34 Units, 34 Units, SubCUTAneous, QHS, Moni Patel MD, 34 Units at 07/11/22 2201    oxyCODONE IR (ROXICODONE) tablet 5 mg, 5 mg, Oral, Q12H, Moni Patel MD, 5 mg at 07/12/22 0904    0.9% sodium chloride infusion, 100 mL/hr, IntraVENous, CONTINUOUS, Tila Hess MD, Last Rate: 100 mL/hr at 07/12/22 0313, 100 mL/hr at 07/12/22 0313    LAB DATA REVIEWED:    Recent Results (from the past 24 hour(s))   URINALYSIS W/ REFLEX CULTURE    Collection Time: 07/11/22 11:48 AM    Specimen: Urine   Result Value Ref Range    Color Yellow/Straw      Appearance Clear Clear      Specific gravity 1.011 1.003 - 1.030      pH (UA) 5.0 5.0 - 8.0      Protein Negative Negative mg/dL    Glucose >300 (A) Negative mg/dL    Ketone Negative Negative mg/dL    Bilirubin Negative Negative      Blood Negative Negative      Urobilinogen 0.1 0.1 - 1.0 EU/dL    Nitrites Negative Negative Leukocyte Esterase Trace (A) Negative      UA:UC IF INDICATED Culture not indicated by UA result Culture not indicated by UA result      WBC 5-10 0 - 4 /hpf    RBC 0-5 0 - 5 /hpf    Bacteria Negative Negative /hpf    Mucus Trace /lpf   GLUCOSE, POC    Collection Time: 07/11/22  4:36 PM   Result Value Ref Range    Glucose (POC) 145 (H) 65 - 117 mg/dL    Performed by Mary Woodward    COVID-19 RAPID TEST    Collection Time: 07/11/22  6:30 PM   Result Value Ref Range    COVID-19 rapid test Not Detected Not Detected     GLUCOSE, POC    Collection Time: 07/11/22  9:32 PM   Result Value Ref Range    Glucose (POC) 160 (H) 65 - 117 mg/dL    Performed by Barbara Montero    MRSA SCREEN - PCR (NASAL)    Collection Time: 07/12/22  1:50 AM   Result Value Ref Range    MRSA by PCR, Nasal Not Detected Not Detected     PROTEIN URINE, RANDOM    Collection Time: 07/12/22  3:15 AM   Result Value Ref Range    Protein, urine random 22 (H) 0.0 - 11.9 mg/dL   POTASSIUM, UR, RANDOM    Collection Time: 07/12/22  3:15 AM   Result Value Ref Range    Potassium urine, random 16 mmol/L   PROTEIN/CREATININE RATIO, URINE    Collection Time: 07/12/22  3:15 AM   Result Value Ref Range    Protein, urine random 22 (H) 0.0 - 11.9 mg/dL    Creatinine, urine 45.00 mg/dL    Protein/Creat.  urine Ratio 0.5     CREATININE, UR, RANDOM    Collection Time: 07/12/22  3:15 AM   Result Value Ref Range    Creatinine, urine 44.00 mg/dL   SODIUM, UR, RANDOM    Collection Time: 07/12/22  3:15 AM   Result Value Ref Range    Sodium,urine random 56 mmol/L   METABOLIC PANEL, COMPREHENSIVE    Collection Time: 07/12/22  7:33 AM   Result Value Ref Range    Sodium 141 136 - 145 mmol/L    Potassium 3.5 3.5 - 5.1 mmol/L    Chloride 104 97 - 108 mmol/L    CO2 31 21 - 32 mmol/L    Anion gap 6 5 - 15 mmol/L    Glucose 132 (H) 65 - 100 mg/dL    BUN 35 (H) 6 - 20 mg/dL    Creatinine 1.91 (H) 0.70 - 1.30 mg/dL    BUN/Creatinine ratio 18 12 - 20      GFR est AA 43 (L) >60 ml/min/1.73m2 GFR est non-AA 35 (L) >60 ml/min/1.73m2    Calcium 8.4 (L) 8.5 - 10.1 mg/dL    Bilirubin, total PENDING mg/dL    AST (SGOT) 22 15 - 37 U/L    ALT (SGPT) 20 12 - 78 U/L    Alk. phosphatase 83 45 - 117 U/L    Protein, total 5.6 (L) 6.4 - 8.2 g/dL    Albumin 2.4 (L) 3.5 - 5.0 g/dL    Globulin 3.2 2.0 - 4.0 g/dL    A-G Ratio 0.8 (L) 1.1 - 2.2     CBC WITH AUTOMATED DIFF    Collection Time: 07/12/22  7:33 AM   Result Value Ref Range    WBC 7.4 4.1 - 11.1 K/uL    RBC 3.41 (L) 4.10 - 5.70 M/uL    HGB 10.5 (L) 12.1 - 17.0 g/dL    HCT 33.1 (L) 36.6 - 50.3 %    MCV 97.1 80.0 - 99.0 FL    MCH 30.8 26.0 - 34.0 PG    MCHC 31.7 30.0 - 36.5 g/dL    RDW 13.3 11.5 - 14.5 %    PLATELET 526 051 - 279 K/uL    MPV 11.5 8.9 - 12.9 FL    NRBC 0.0 0.0  WBC    ABSOLUTE NRBC 0.00 0.00 - 0.01 K/uL    NEUTROPHILS 70 32 - 75 %    LYMPHOCYTES 13 12 - 49 %    MONOCYTES 9 5 - 13 %    EOSINOPHILS 8 (H) 0 - 7 %    BASOPHILS 0 0 - 1 %    IMMATURE GRANULOCYTES 0 0 - 0.5 %    ABS. NEUTROPHILS 5.1 1.8 - 8.0 K/UL    ABS. LYMPHOCYTES 1.0 0.8 - 3.5 K/UL    ABS. MONOCYTES 0.7 0.0 - 1.0 K/UL    ABS. EOSINOPHILS 0.6 (H) 0.0 - 0.4 K/UL    ABS. BASOPHILS 0.0 0.0 - 0.1 K/UL    ABS. IMM. GRANS. 0.0 0.00 - 0.04 K/UL    DF AUTOMATED     GLUCOSE, POC    Collection Time: 07/12/22  8:25 AM   Result Value Ref Range    Glucose (POC) 111 65 - 117 mg/dL    Performed by Gold Velasco        XR Results (most recent):  Results from Hospital Encounter encounter on 07/11/22    XR CHEST PORT    Narrative  Indication: Chest pain    Comparison: 6/9/2021    Portable exam of the chest obtained at 956 demonstrates normal heart size. Pacer  leads are unchanged in position. There is no acute process in the lung fields. The osseous structures are unremarkable. Impression  No acute process. CT ABD PELV WO CONT   Final Result   1. Moderate left hydronephrosis and ureterectasis. No obstructing, radiopaque,   left urinary calculus.  This suggests a recently passed stone.   2. Cirrhosis and portal hypertension. No ascites. Liver protocol CT or MRI is   recommended on a nonemergent basis for hepatocellular carcinoma screening. US RETROPERITONEUM COMP   Final Result   No right hydronephrosis. Nondiagnostic imaging of the left kidney. XR CHEST PORT   Final Result   No acute process. Review of Systems:  Constitutional: Negative for chills and fever. HENT: Negative. Eyes: Negative. Respiratory: Negative. Cardiovascular: Negative. Gastrointestinal: Negative for abdominal pain and nausea. Genitourinary: Positive for flank pain. Negative for dysuria  Skin: Negative. Neurological: Negative. Objective:   VITALS:    Visit Vitals  /65 (BP 1 Location: Left upper arm, BP Patient Position: At rest;Supine)   Pulse 82   Temp 98.4 °F (36.9 °C)   Resp 20   Ht 5' 11\" (1.803 m)   Wt 157.4 kg (347 lb)   SpO2 98%   BMI 48.40 kg/m²       Physical Exam:   Constitutional: pt is oriented to person, place, and time. Pt is obese   HENT:   Head: Normocephalic and atraumatic. Eyes: Pupils are equal, round, and reactive to light. EOM are normal.   Cardiovascular: Normal rate, regular rhythm and normal heart sounds. Pulmonary/Chest: Breath sounds normal. No wheezes. No rales. Exhibits no tenderness. Abdominal: Soft. Bowel sounds are normal. There is no abdominal tenderness. There is no rebound and no guarding. There is bilateral CVA tenderness. Musculoskeletal: Normal range of motion. Neurological: pt is alert and oriented to person, place, and time. Alert. Normal strength. No cranial nerve deficit or sensory deficit. Displays a negative Romberg sign. Skin: Bilateral lower leg ulcers.       ASSESSMENT:  Acute kidney injury   Possible hx nephrolithiasis  Diabetic Ulcers, bilaterally  History of CHF, diastolic dysfunction   COPD  Type 2 DM  HTN  Gout  Sleep apnea  Morbid obesity    PLAN:  IV fluids  Monitor serum Cr  Consult nephrology  Continue to monitor patient     Continue meds:  ASA 81 mg po daily  Clonidine 0.1mg BID  Bumetanide 2 mg BID  Colchicine 0.6 mg po daily  Ferrous sulfate 325 mg TID  Flonase 2 puffs daily  Gabapentin 300 mg QID  Heparin 5,000 units [id number]  Lantus 34 units subq qHS  Lantus 80 units subq daily  Humalog subq QID, QHS  Oxycodone 5 mg BID  Flomax 0.4 mg po daily  Spiriva 2 puffs daily                    ________________________________________________________________________    Signed: Sabra Rose

## 2022-07-12 NOTE — CONSULTS
[facility] Consult Note      NAME:  Rommel Strauss   :   1955   MRN:  [id number]     Requesting Physician Tiny Williamson MD   Reason for Consult:  Acute azotemia     PCP:  Tiny Williamson MD     Date/Time:  2022 4:18 PM          Subjective:     CHIEF COMPLAINT: Back pain, left flank pain    HISTORY OF PRESENT ILLNESS:     Mr. Dmitry Stubbs is a 78 y.o.  male who is admitted to the medical service with back and flank pain; noted to have acute azotemia. We are asked to evaluate for elevated BUN/creatinine. Patient with long history of adult onset diabetes mellitus until recently was poorly controlled. He is developed peripheral vascular disease with diabetic leg ulcers. He is also noted diminished vision but has not seen an ophthalmologist.  No prior history of retinopathy. Review of labs suggest he has CKD stage IIIa but proteinuria determination has not been made. Current admission prompted by development of fairly significant back pain particular on the left flank area. He had a CT scan which showed evidence of left hydronephrosis and probable recent passage of a left kidney stone. No stone was present on CT scan. He feels better. Initially had nausea with vomiting. This is resolved. Not short of breath. No chest pain. Has chronic back pain although this episode was different. The left flank pain is resolving. No gross hematuria but there is difficulty in seeing his urine because of obesity and a hidden/buried penis.   Patient says he has never seen a nephrologist.    Past Medical History:   Diagnosis Date    Arthritis     CAD (coronary artery disease)     [facility] obstructive pulmonary disease [facility] [address]     Diabetes ([facility] [address]     Gout     Hypertension     Ill-defined condition     Sleep apnea         Past Surgical History:   Procedure Laterality Date    HX ORTHOPAEDIC      HX PACEMAKER      HX VEIN ABLATION ADHESIVE         Social History Tobacco Use    Smoking status: Never Smoker    Smokeless tobacco: Never Used   Substance Use Topics    Alcohol use: Not Currently        Family History   Problem Relation Age of Onset    Heart Disease Mother     Kidney Disease Mother     Hypertension Mother     Diabetes Mother     Heart Disease Father     Hypertension Father     Diabetes Sister     Diabetes Brother         Allergies   Allergen Reactions    Elavil Angioedema    Naproxen Unknown (comments)     Pt not sure of reaction    Sulfa (Sulfonamide Antibiotics) Nausea and Vomiting        Prior to Admission medications    Medication Sig Start Date End Date Taking? Authorizing Provider   insulin glargine,hum.rec.anlog (TOUJEO MAX U-300 SOLOSTAR SC) 80 Units by SubCUTAneous route two (2) times a day. 80 units in am, 34 units in pm    Provider, Historical   b complex vitamins tablet Take 1 Tablet by mouth daily. Provider, Historical   cloNIDine HCL (CATAPRES) 0.1 mg tablet Take 1 Tablet by mouth every twelve (12) hours. Patient not taking: Reported on 7/12/2022 5/27/21   Provider, Historical   ammonium lactate (AMLACTIN) 12 % topical cream Apply  to affected area as needed for Other (xerosis). rub in to affected area well  Indications: dry skin 6/8/21   Maegan Patel MD   vitamin E, dl,tocopheryl acet, (vitamin E acetate) 400 unit capsule Take 2 Capsules by mouth two (2) times a day. 6/8/21   Maegan Patel MD   ascorbic acid, vitamin C, (Vitamin C) 500 mg tablet Take 1,000 mg by mouth two (2) times a day. Indications: inadequate vitamin C    Provider, Historical   tamsulosin (Flomax) 0.4 mg capsule Take 0.8 mg by mouth daily. Provider, Historical   tiotropium (Spiriva with HandiHaler) 18 mcg inhalation capsule Take 1 Capsule by inhalation daily. Provider, Historical   bumetanide (BUMEX) 2 mg tablet Take 2 mg by mouth two (2) times a day.     Provider, Historical   ferrous sulfate (Iron) 325 mg (65 mg iron) tablet Take  by mouth two (2) times daily (after meals). Provider, Historical   aspirin 81 mg chewable tablet Take 81 mg by mouth daily. Provider, Historical   flunisolide (NASAREL) 25 mcg (0.025 %) spry 2 Sprays two (2) times a day. Provider, Historical   albuterol (ProAir HFA) 90 mcg/actuation inhaler Take 1 Puff by inhalation every four (4) hours as needed for Wheezing or Shortness of Breath. Provider, Historical   colchicine 0.6 mg tablet Take 0.6 mg by mouth daily. Provider, Historical   gabapentin (NEURONTIN) 300 mg capsule Take 300 mg by mouth four (4) times daily. Provider, Historical   oxyCODONE IR (ROXICODONE) 5 mg immediate release tablet Take 5 mg by mouth every twelve (12) hours. Provider, Historical   insulin lispro (HumaLOG U-100 Insulin) 100 unit/mL injection by SubCUTAneous route.  Sliding scale    Provider, Historical         Current Facility-Administered Medications:     oxyCODONE IR (ROXICODONE) tablet 5 mg, 5 mg, Oral, Q6H PRN, Moni Patel MD, 5 mg at 07/12/22 1241    zinc oxide-white petrolatum 20-75 % topical paste, , Topical, BID, Moni Patel MD, Given at 07/12/22 1239    albuterol (PROVENTIL HFA, VENTOLIN HFA, PROAIR HFA) inhaler 2 Puff, 2 Puff, Inhalation, Q6H PRN, Moni Patel MD    ascorbic acid (vitamin C) (VITAMIN C) tablet 1,000 mg, 1,000 mg, Oral, BID, Moni Patel MD, 1,000 mg at 07/12/22 3989    aspirin chewable tablet 81 mg, 81 mg, Oral, DAILY, Moni Patel MD, 81 mg at 07/12/22 0858    b complex-vitamin c-folic acid 5mg (FOLBEE PLUS) tablet 1 Tablet, 1 Tablet, Oral, DAILY, Moni Patel MD, 1 Tablet at 07/12/22 0959    bumetanide (BUMEX) tablet 2 mg, 2 mg, Oral, BID, Moni Patel MD, 2 mg at 07/12/22 0858    cloNIDine HCL (CATAPRES) tablet 0.1 mg, 0.1 mg, Oral, Q12H, Moni Patel MD, 0.1 mg at 07/12/22 0858    colchicine tablet 0.6 mg, 0.6 mg, Oral, DAILY, Moni Patel MD, 0.6 mg at 07/12/22 0858    ferrous sulfate tablet 325 mg, 1 Tablet, Oral, TID WITH MEALS, Charley Patel MD, 325 mg at 07/12/22 1238    fluticasone propionate (FLONASE) 50 mcg/actuation nasal spray 2 Spray, 2 Spray, Both Nostrils, DAILY, Moni Patel MD, 2 Spray at 07/12/22 1238    gabapentin (NEURONTIN) capsule 300 mg, 300 mg, Oral, QID, Moni Patel MD, 300 mg at 07/12/22 1238    insulin glargine (LANTUS) injection 80 Units, 80 Units, SubCUTAneous, DAILY, Moni Patel MD, 80 Units at 07/12/22 0858    tamsulosin (FLOMAX) capsule 0.4 mg, 0.4 mg, Oral, DAILY, Moni Patel MD, 0.4 mg at 07/12/22 0858    tiotropium bromide (SPIRIVA RESPIMAT) 2.5 mcg /actuation, 2 Puff, Inhalation, DAILY, Maynor Dickey MD, 2 Puff at 07/12/22 0757    insulin lispro (HUMALOG) injection, , SubCUTAneous, AC&HS, Moni Patel MD, 3 Units at 07/11/22 1641    glucose chewable tablet 16 g, 4 Tablet, Oral, PRN, Charley Patel MD    glucagon (GLUCAGEN) injection 1 mg, 1 mg, IntraMUSCular, PRN, Charley Patel MD    acetaminophen (TYLENOL) tablet 650 mg, 650 mg, Oral, Q6H PRN, 650 mg at 07/11/22 1639 **OR** acetaminophen (TYLENOL) suppository 650 mg, 650 mg, Rectal, Q6H PRN, Charley Patel MD    polyethylene glycol (MIRALAX) packet 17 g, 17 g, Oral, DAILY PRN, Charley Patel MD    ondansetron (ZOFRAN ODT) tablet 4 mg, 4 mg, Oral, Q8H PRN **OR** ondansetron (ZOFRAN) injection 4 mg, 4 mg, IntraVENous, Q6H PRN, Moni Patel MD    heparin (porcine) injection 5,000 Units, 5,000 Units, SubCUTAneous, Q8H, Moni Patel MD, 5,000 Units at 07/12/22 1509    insulin glargine (LANTUS) injection 34 Units, 34 Units, SubCUTAneous, QHS, Moni Patel MD, 34 Units at 07/11/22 2201    oxyCODONE IR (ROXICODONE) tablet 5 mg, 5 mg, Oral, Q12H, Moni Patel MD, 5 mg at 07/12/22 0904    0.9% sodium chloride infusion, 100 mL/hr, IntraVENous, CONTINUOUS, Ruperto Valle MD, Last Rate: 100 mL/hr at 07/12/22 0313, 100 mL/hr at 07/12/22 0313      Review of Systems:  A comprehensive review of systems was negative except for that written in the HPI. Objective:      VITALS:    Vital signs reviewed; most recent are:    Visit Vitals  BP (!) 111/58 (BP 1 Location: Left upper arm, BP Patient Position: At rest;Supine)   Pulse 69   Temp 98.6 °F (37 °C)   Resp 20   Ht 5' 11\" (1.803 m)   Wt 157.4 kg (347 lb)   SpO2 99%   BMI 48.40 kg/m²     SpO2 Readings from Last 6 Encounters:   07/12/22 99%   07/06/22 95%   05/25/22 99%   05/11/22 98%   04/13/22 98%   04/06/22 100%    O2 Flow Rate (L/min): 3 l/min       Intake/Output Summary (Last 24 hours) at 7/12/2022 1618  Last data filed at 7/12/2022 1242  Gross per 24 hour   Intake 200 ml   Output 1900 ml   Net -1700 ml            Exam:   Physical Exam:General appearance: alert, cooperative, no distress, appears older than stated age, morbidly obese  Head: Normocephalic, without obvious abnormality, atraumatic  Eyes: negative findings: anicteric sclera and conjunctivae and sclerae normal  Neck: supple, symmetrical, trachea midline, no adenopathy and thick neck, could not assess for JVD  Lungs: clear to auscultation bilaterally  Heart: regular rate and rhythm, no S3 or S4  Abdomen: soft, non-tender. Bowel sounds normal. No masses,  no organomegaly, no CVAT  Extremities: Lower legs wrapped but there did appear to be some pretibial edema  Neurologic: Grossly normal      LABS:  Recent Labs     07/12/22  0733 07/11/22  0950    139   K 3.5 3.8    101   CO2 31 31   BUN 35* 37*   CREA 1.91* 2.53*   CA 8.4* 8.5   ALB 2.4* 2.9*     Recent Labs     07/12/22  0733 07/11/22  0950   WBC 7.4 12.4*   HGB 10.5* 11.5*   HCT 33.1* 36.7    204     Lab Results   Component Value Date/Time    Glucose (POC) 128 (H) 07/12/2022 11:44 AM    Glucose (POC) 111 07/12/2022 08:25 AM     No results for input(s): PH, PCO2, PO2, HCO3, FIO2 in the last 72 hours. No results for input(s): INR, INREXT in the last 72 hours.   Recent Labs 07/12/22  0315   [facility]   [address] 45.00  44.00       Assessment:   Renal Specific Problems  Probable CKD stage IIIa at baseline  Adult onset diabetes mellitus  Acute kidney injury likely related to volume depletion in the setting of a acute kidney stone-serum creatinine appears to be returning to baseline based on old lab review  Probable nephrolithiasis with passage of stone  Morbid obesity  COPD by history and likely has sleep apnea        Plan:     Obtain/ Order: labs/cultures/radiology/procedures:  renal panel        Therapeutic:    Continue IV fluids for now   evaluation ongoing  Avoid nonsteroidal anti-inflammatory agents    Risk of deterioration: low      Total time spent with patient: 30 Minutes                  Discussed:  Dr. Macy Mitchell and patient           ___________________________________________________    Attending Physician: Ludin Malave MD

## 2022-07-12 NOTE — PROGRESS NOTES
Problem: Falls - Risk of  Goal: *Absence of Falls  Description: Document Homero Cease Fall Risk and appropriate interventions in the flowsheet. Outcome: Progressing Towards Goal  Note: Fall Risk Interventions:  Mobility Interventions: Bed/chair exit alarm,Patient to call before getting OOB         Medication Interventions: Bed/chair exit alarm,Patient to call before getting OOB    Elimination Interventions: Call light in reach,Patient to call for help with toileting needs              Problem: Patient Education: Go to Patient Education Activity  Goal: Patient/Family Education  Outcome: Progressing Towards Goal     Problem: Pressure Injury - Risk of  Goal: *Prevention of pressure injury  Description: Document Kumar Scale and appropriate interventions in the flowsheet.   Outcome: Progressing Towards Goal  Note: Pressure Injury Interventions:  Sensory Interventions: Minimize linen layers    Moisture Interventions: Minimize layers    Activity Interventions: Increase time out of bed    Mobility Interventions: PT/OT evaluation    Nutrition Interventions: Document food/fluid/supplement intake    Friction and Shear Interventions: Minimize layers                Problem: Patient Education: Go to Patient Education Activity  Goal: Patient/Family Education  Outcome: Progressing Towards Goal     Problem: Diabetic Ulcer-Treatment  Goal: *Improvement of existing diabetic ulcer  Outcome: Progressing Towards Goal     Problem: Patient Education: Go to Patient Education Activity  Goal: Patient/Family Education  Outcome: Progressing Towards Goal

## 2022-07-13 LAB
ALBUMIN SERPL-MCNC: 2.3 G/DL (ref 3.5–5)
ANION GAP SERPL CALC-SCNC: 6 MMOL/L (ref 5–15)
BUN SERPL-MCNC: 30 MG/DL (ref 6–20)
BUN/CREAT SERPL: 19 (ref 12–20)
CA-I BLD-MCNC: 8.4 MG/DL (ref 8.5–10.1)
CHLORIDE SERPL-SCNC: 103 MMOL/L (ref 97–108)
CO2 SERPL-SCNC: 32 MMOL/L (ref 21–32)
CREAT SERPL-MCNC: 1.58 MG/DL (ref 0.7–1.3)
GLUCOSE BLD STRIP.AUTO-MCNC: 106 MG/DL (ref 65–117)
GLUCOSE BLD STRIP.AUTO-MCNC: 129 MG/DL (ref 65–117)
GLUCOSE BLD STRIP.AUTO-MCNC: 141 MG/DL (ref 65–117)
GLUCOSE BLD STRIP.AUTO-MCNC: 83 MG/DL (ref 65–117)
GLUCOSE SERPL-MCNC: 144 MG/DL (ref 65–100)
PERFORMED BY, TECHID: ABNORMAL
PERFORMED BY, TECHID: ABNORMAL
PERFORMED BY, TECHID: NORMAL
PERFORMED BY, TECHID: NORMAL
PHOSPHATE SERPL-MCNC: 3.5 MG/DL (ref 2.6–4.7)
POTASSIUM SERPL-SCNC: 3.6 MMOL/L (ref 3.5–5.1)
SODIUM SERPL-SCNC: 141 MMOL/L (ref 136–145)

## 2022-07-13 PROCEDURE — 65270000029 HC RM PRIVATE

## 2022-07-13 PROCEDURE — 80069 RENAL FUNCTION PANEL: CPT

## 2022-07-13 PROCEDURE — 94761 N-INVAS EAR/PLS OXIMETRY MLT: CPT

## 2022-07-13 PROCEDURE — 36415 COLL VENOUS BLD VENIPUNCTURE: CPT

## 2022-07-13 PROCEDURE — 74011250636 HC RX REV CODE- 250/636: Performed by: INTERNAL MEDICINE

## 2022-07-13 PROCEDURE — 74011250636 HC RX REV CODE- 250/636: Performed by: FAMILY MEDICINE

## 2022-07-13 PROCEDURE — 77010033678 HC OXYGEN DAILY

## 2022-07-13 PROCEDURE — 74011636637 HC RX REV CODE- 636/637: Performed by: FAMILY MEDICINE

## 2022-07-13 PROCEDURE — 82962 GLUCOSE BLOOD TEST: CPT

## 2022-07-13 PROCEDURE — 94640 AIRWAY INHALATION TREATMENT: CPT

## 2022-07-13 PROCEDURE — 74011250637 HC RX REV CODE- 250/637: Performed by: FAMILY MEDICINE

## 2022-07-13 RX ORDER — SIMETHICONE 80 MG
80 TABLET,CHEWABLE ORAL
Status: DISCONTINUED | OUTPATIENT
Start: 2022-07-13 | End: 2022-07-14 | Stop reason: HOSPADM

## 2022-07-13 RX ORDER — SODIUM CHLORIDE 9 MG/ML
100 INJECTION, SOLUTION INTRAVENOUS CONTINUOUS
Status: DISCONTINUED | OUTPATIENT
Start: 2022-07-13 | End: 2022-07-14

## 2022-07-13 RX ADMIN — ASPIRIN 81 MG 81 MG: 81 TABLET ORAL at 09:47

## 2022-07-13 RX ADMIN — BUMETANIDE 2 MG: 1 TABLET ORAL at 23:29

## 2022-07-13 RX ADMIN — INSULIN GLARGINE 34 UNITS: 100 INJECTION, SOLUTION SUBCUTANEOUS at 21:00

## 2022-07-13 RX ADMIN — FLUTICASONE PROPIONATE 2 SPRAY: 50 SPRAY, METERED NASAL at 09:48

## 2022-07-13 RX ADMIN — CLONIDINE HYDROCHLORIDE 0.1 MG: 0.1 TABLET ORAL at 09:47

## 2022-07-13 RX ADMIN — OXYCODONE 5 MG: 5 TABLET ORAL at 09:47

## 2022-07-13 RX ADMIN — GABAPENTIN 300 MG: 300 CAPSULE ORAL at 17:26

## 2022-07-13 RX ADMIN — FERROUS SULFATE TAB 325 MG (65 MG ELEMENTAL FE) 325 MG: 325 (65 FE) TAB at 12:01

## 2022-07-13 RX ADMIN — WHITE PETROLATUM,ZINC OXIDE: 20; 75 PASTE TOPICAL at 21:04

## 2022-07-13 RX ADMIN — BUMETANIDE 2 MG: 1 TABLET ORAL at 09:46

## 2022-07-13 RX ADMIN — SIMETHICONE 80 MG: 80 TABLET, CHEWABLE ORAL at 13:43

## 2022-07-13 RX ADMIN — TAMSULOSIN HYDROCHLORIDE 0.4 MG: 0.4 CAPSULE ORAL at 09:47

## 2022-07-13 RX ADMIN — SODIUM CHLORIDE 100 ML/HR: 9 INJECTION, SOLUTION INTRAVENOUS at 23:29

## 2022-07-13 RX ADMIN — OXYCODONE HYDROCHLORIDE AND ACETAMINOPHEN 1000 MG: 500 TABLET ORAL at 09:47

## 2022-07-13 RX ADMIN — WHITE PETROLATUM,ZINC OXIDE: 20; 75 PASTE TOPICAL at 09:48

## 2022-07-13 RX ADMIN — OXYCODONE 5 MG: 5 TABLET ORAL at 17:27

## 2022-07-13 RX ADMIN — HEPARIN SODIUM 5000 UNITS: 5000 INJECTION INTRAVENOUS; SUBCUTANEOUS at 20:58

## 2022-07-13 RX ADMIN — HEPARIN SODIUM 5000 UNITS: 5000 INJECTION INTRAVENOUS; SUBCUTANEOUS at 13:43

## 2022-07-13 RX ADMIN — GABAPENTIN 300 MG: 300 CAPSULE ORAL at 09:47

## 2022-07-13 RX ADMIN — GABAPENTIN 300 MG: 300 CAPSULE ORAL at 20:58

## 2022-07-13 RX ADMIN — OXYCODONE 5 MG: 5 TABLET ORAL at 20:57

## 2022-07-13 RX ADMIN — FERROUS SULFATE TAB 325 MG (65 MG ELEMENTAL FE) 325 MG: 325 (65 FE) TAB at 17:26

## 2022-07-13 RX ADMIN — HEPARIN SODIUM 5000 UNITS: 5000 INJECTION INTRAVENOUS; SUBCUTANEOUS at 05:11

## 2022-07-13 RX ADMIN — SIMETHICONE 80 MG: 80 TABLET, CHEWABLE ORAL at 20:57

## 2022-07-13 RX ADMIN — FERROUS SULFATE TAB 325 MG (65 MG ELEMENTAL FE) 325 MG: 325 (65 FE) TAB at 09:47

## 2022-07-13 RX ADMIN — GABAPENTIN 300 MG: 300 CAPSULE ORAL at 12:01

## 2022-07-13 RX ADMIN — Medication 1 TABLET: at 15:07

## 2022-07-13 RX ADMIN — COLCHICINE 0.6 MG: 0.6 TABLET, FILM COATED ORAL at 09:47

## 2022-07-13 RX ADMIN — OXYCODONE 5 MG: 5 TABLET ORAL at 04:56

## 2022-07-13 RX ADMIN — TIOTROPIUM BROMIDE INHALATION SPRAY 2 PUFF: 3.12 SPRAY, METERED RESPIRATORY (INHALATION) at 08:00

## 2022-07-13 RX ADMIN — OXYCODONE HYDROCHLORIDE AND ACETAMINOPHEN 1000 MG: 500 TABLET ORAL at 20:57

## 2022-07-13 RX ADMIN — INSULIN GLARGINE 80 UNITS: 100 INJECTION, SOLUTION SUBCUTANEOUS at 09:46

## 2022-07-13 RX ADMIN — INSULIN LISPRO 3 UNITS: 100 INJECTION, SOLUTION INTRAVENOUS; SUBCUTANEOUS at 12:01

## 2022-07-13 RX ADMIN — SODIUM CHLORIDE 100 ML/HR: 9 INJECTION, SOLUTION INTRAVENOUS at 12:16

## 2022-07-13 NOTE — PROGRESS NOTES
PROGRESS NOTE    Patient: Kory Barrett MRN: [id number]  SSN: [id number]    YOB: 1955  Age: 78 y.o. Sex: male      Admit Date: 7/11/2022    LOS: 2 days       Subjective     Chief Complaint   Patient presents with    Chest Pain    Flank Pain     kidneys       HPI: Patient is a 78y.o. year old male PMH MORBID OBESITY, DM, HTN, CHF, COPD, sleep apnea chronic venous stasis ulcers more in the left leg than right CAD, and recent MAURICIO who presents with flank and chest pain. Patient reports bilateral flank pain. He has a history of chronic back pain, but states this feels different. He is urinating less than normal and it is dark in color. He reports incontinence. He had a recent hospitalization for MAURICIO. He states he had left sided abdominal pain which has now resolved. Denies fever, shortness of breath, nausea, vomiting.     BUN: 35  Cr: 1.91  GFR: 35  PVR: 53     CT ABD PELV WO CONT  1. Moderate left hydronephrosis and ureterectasis. No obstructing, radiopaque,  left urinary calculus. This suggests a recently passed stone. 2. Cirrhosis and portal hypertension. No ascites. Liver protocol CT or MRI is recommended on a nonemergent basis for hepatocellular carcinoma screening. 7/13  Pt laying in bed comfortably. NAD  Flank pain improved significantly  Urinating, yellow color  Pt reports passing a lot of gas, no BM in 3 days with hard stools prior to his hospital stay. No history of abdominal surgery   Seen by urology and nephrology yesterday  Cr trending downward 1.58<1.91<2.53      Review of Systems   Constitutional: Negative. HENT: Negative. Eyes: Negative. Respiratory: Negative. Cardiovascular: Negative. Gastrointestinal: Positive for constipation. Genitourinary: Positive for flank pain. Musculoskeletal: Positive for back pain. Skin: Negative. Neurological: Negative. Endo/Heme/Allergies: Negative. Psychiatric/Behavioral: Negative.           Objective     Visit Vitals  /62 (BP 1 Location: Right upper arm, BP Patient Position: Semi fowlers)   Pulse 73   Temp 98.1 °F (36.7 °C)   Resp 16   Ht 5' 11\" (1.803 m)   Wt 157.4 kg (347 lb)   SpO2 99%   BMI 48.40 kg/m²    O2 Flow Rate (L/min): 3 l/min O2 Device: Nasal cannula    Physical Exam:   Physical Exam  Vitals reviewed. Constitutional:       Appearance: Normal appearance. He is obese. HENT:      Head: Normocephalic and atraumatic. Cardiovascular:      Rate and Rhythm: Normal rate and regular rhythm. Heart sounds: Normal heart sounds. Pulmonary:      Effort: Pulmonary effort is normal.      Breath sounds: Normal breath sounds. Abdominal:      General: Bowel sounds are normal.   Musculoskeletal:         General: Normal range of motion. Neurological:      General: No focal deficit present. Mental Status: He is alert and oriented to person, place, and time. Psychiatric:         Mood and Affect: Mood normal.         Intake & Output:  Current Shift: 07/13 0701 - 07/13 1900  In: -   Out: 950 [Urine:950]  Last three shifts: 07/11 1901 - 07/13 0700  In: 2950 [P.O.:1750; I.V.:1200]  Out: 3700 [Urine:3700]    Lab/Data Review: All lab results for the last 24 hours reviewed.        24 Hour Results:    Recent Results (from the past 24 hour(s))   GLUCOSE, POC    Collection Time: 07/12/22  4:47 PM   Result Value Ref Range    Glucose (POC) 97 65 - 117 mg/dL    Performed by Tiara Macedo    GLUCOSE, POC    Collection Time: 07/12/22  9:20 PM   Result Value Ref Range    Glucose (POC) 141 (H) 65 - 117 mg/dL    Performed by [address] [place] [id number], POC    Collection Time: 07/13/22  8:48 AM   Result Value Ref Range    Glucose (POC) 83 65 - 117 mg/dL    Performed by Ansley Jonas    GLUCOSE, POC    Collection Time: 07/13/22 11:18 AM   Result Value Ref Range    Glucose (POC) 141 (H) 65 - 117 mg/dL    Performed by Angeles Prieto    RENAL FUNCTION PANEL    Collection Time: 07/13/22 11:19 AM   Result Value Ref Range    Sodium 141 136 - 145 mmol/L    Potassium 3.6 3.5 - 5.1 mmol/L    Chloride 103 97 - 108 mmol/L    CO2 32 21 - 32 mmol/L    Anion gap 6 5 - 15 mmol/L    Glucose 144 (H) 65 - 100 mg/dL    BUN 30 (H) 6 - 20 mg/dL    Creatinine 1.58 (H) 0.70 - 1.30 mg/dL    BUN/Creatinine ratio 19 12 - 20      GFR est AA 53 (L) >60 ml/min/1.73m2    GFR est non-AA 44 (L) >60 ml/min/1.73m2    Calcium 8.4 (L) 8.5 - 10.1 mg/dL    Phosphorus 3.5 2.6 - 4.7 mg/dL    Albumin 2.3 (L) 3.5 - 5.0 g/dL         Imaging:    CT ABD PELV WO CONT   Final Result   1. Moderate left hydronephrosis and ureterectasis. No obstructing, radiopaque,   left urinary calculus. This suggests a recently passed stone. 2. Cirrhosis and portal hypertension. No ascites. Liver protocol CT or MRI is   recommended on a nonemergent basis for hepatocellular carcinoma screening. US RETROPERITONEUM COMP   Final Result   No right hydronephrosis. Nondiagnostic imaging of the left kidney. XR CHEST PORT   Final Result   No acute process. Assessment   Acute kidney injury   Left  hydronephrosis  Possible hx nephrolithiasis  Diabetic Ulcers, bilaterally  Chronic venous stasis  Leg wounds  History of CHF, diastolic dysfunction   Image quality: suboptimal. The view(s) performed were parasternal, apical, subcostal and suprasternal. Color flow Doppler was performed and pulse wave and/or continuous wave Doppler was performed. No contrast was given. 1. This was a technically difficult study  2. The aortic and tricuspid valve were unable to be visualized. 3. Multisegment wall motion abnormality detected on this study. The overall EF was 30-35%. 4. No pericardial effusion seen on this study.   COPD  Type 2 DM  HTN  Gout  Sleep apnea  Morbid obesity    Plan   Continue meds:  ASA 81 mg po daily  Clonidine 0.1mg BID  Bumetanide 2 mg BID  Colchicine 0.6 mg po daily  Ferrous sulfate 325 mg TID  Flonase 2 puffs daily  Gabapentin 300 mg QID  Heparin 5,000 units [id number]  Lantus 34 units subq qHS  Lantus 80 units subq daily  Humalog subq QID, QHS  Oxycodone 5 mg BID  Flomax 0.4 mg po daily  Spiriva 2 puffs daily      Vitals stable  Cr trending downward 1.58<1.91<2.53  Monitor renal function   Miralax prn   Avoid NSAIDs  Outpatient stone work up to determine type of nephrolithiasis, Dr. Karyn Holguin outpatient follow-up Urology    rerpeat  the labs in the morning and possible discharge tomorrow        Current Facility-Administered Medications:     0.9% sodium chloride infusion, 100 mL/hr, IntraVENous, CONTINUOUS, Neeta Weeks MD, Last Rate: 100 mL/hr at 07/13/22 1216, 100 mL/hr at 07/13/22 1216    simethicone (MYLICON) tablet 80 mg, 80 mg, Oral, QID PRN, Moni Patel MD    oxyCODONE IR (ROXICODONE) tablet 5 mg, 5 mg, Oral, Q6H PRN, Karla Acharya MD, 5 mg at 07/13/22 0456    zinc oxide-white petrolatum 20-75 % topical paste, , Topical, BID, Karla Acharya MD, Given at 07/13/22 0948    albuterol (PROVENTIL HFA, VENTOLIN HFA, PROAIR HFA) inhaler 2 Puff, 2 Puff, Inhalation, Q6H PRN, Karla Acharya MD    ascorbic acid (vitamin C) (VITAMIN C) tablet 1,000 mg, 1,000 mg, Oral, BID, Karla Acharya MD, 1,000 mg at 07/13/22 0116    aspirin chewable tablet 81 mg, 81 mg, Oral, DAILY, Karla Acharya MD, 81 mg at 07/13/22 0947    b complex-vitamin c-folic acid 5mg (FOLBEE PLUS) tablet 1 Tablet, 1 Tablet, Oral, DAILY, Moni Patel MD, 1 Tablet at 07/12/22 0959    bumetanide (BUMEX) tablet 2 mg, 2 mg, Oral, BID, Karla Acharya MD, 2 mg at 07/13/22 0946    cloNIDine HCL (CATAPRES) tablet 0.1 mg, 0.1 mg, Oral, Q12H, Moni Patel MD, 0.1 mg at 07/13/22 9250    colchicine tablet 0.6 mg, 0.6 mg, Oral, DAILY, Moni Patel MD, 0.6 mg at 07/13/22 5764    ferrous sulfate tablet 325 mg, 1 Tablet, Oral, TID WITH MEALS, Moni Patel MD, 325 mg at 07/13/22 1201    fluticasone propionate (FLONASE) 50 mcg/actuation nasal spray 2 Spray, 2 Spray, Both Nostrils, DAILY, Moni Patel MD, 2 Spray at 07/13/22 7660    gabapentin (NEURONTIN) capsule 300 mg, 300 mg, Oral, QID, Jimena Patel MD, 300 mg at 07/13/22 1201    insulin glargine (LANTUS) injection 80 Units, 80 Units, SubCUTAneous, DAILY, Moni Patel MD, 80 Units at 07/13/22 0946    tamsulosin (FLOMAX) capsule 0.4 mg, 0.4 mg, Oral, DAILY, Jimena Patel MD, 0.4 mg at 07/13/22 0947    tiotropium bromide (SPIRIVA RESPIMAT) 2.5 mcg /actuation, 2 Puff, Inhalation, DAILY, Ella Tatum MD, 2 Puff at 07/13/22 0800    insulin lispro (HUMALOG) injection, , SubCUTAneous, AC&HS, Moni Patel MD, 3 Units at 07/13/22 1201    glucose chewable tablet 16 g, 4 Tablet, Oral, PRN, Jimena Patel MD    glucagon (GLUCAGEN) injection 1 mg, 1 mg, IntraMUSCular, PRN, Jimena Patel MD    acetaminophen (TYLENOL) tablet 650 mg, 650 mg, Oral, Q6H PRN, 650 mg at 07/11/22 1639 **OR** acetaminophen (TYLENOL) suppository 650 mg, 650 mg, Rectal, Q6H PRN, Jimena Patel MD    polyethylene glycol (MIRALAX) packet 17 g, 17 g, Oral, DAILY PRN, Jimena Patel MD    ondansetron (ZOFRAN ODT) tablet 4 mg, 4 mg, Oral, Q8H PRN **OR** ondansetron (ZOFRAN) injection 4 mg, 4 mg, IntraVENous, Q6H PRN, Moni Patel MD    heparin (porcine) injection 5,000 Units, 5,000 Units, SubCUTAneous, Q8H, Moni Patel MD, 5,000 Units at 07/13/22 0511    insulin glargine (LANTUS) injection 34 Units, 34 Units, SubCUTAneous, QHS, Moni Patel MD, 34 Units at 07/12/22 9912    oxyCODONE IR (ROXICODONE) tablet 5 mg, 5 mg, Oral, Q12H, Moni Patel MD, 5 mg at 07/13/22 0427    Current Outpatient Medications   Medication Instructions    albuterol (ProAir HFA) 90 mcg/actuation inhaler 1 Puff, Inhalation, EVERY 4 HOURS AS NEEDED    ammonium lactate (AMLACTIN) 12 % topical cream Topical, AS NEEDED, rub in to affected area well    ascorbic acid (vitamin C) (VITAMIN C) 1,000 mg, Oral, 2 TIMES DAILY    aspirin 81 mg, Oral, DAILY    b complex vitamins tablet 1 Tablet, Oral, DAILY    bumetanide (BUMEX) 2 mg, Oral, 2 TIMES DAILY    cloNIDine HCL (CATAPRES) 0.1 mg tablet 1 Tablet, EVERY 12 HOURS    colchicine 0.6 mg, Oral, DAILY    ferrous sulfate (Iron) 325 mg (65 mg iron) tablet Oral, 2 TIMES DAILY AFTER MEALS    flunisolide (NASAREL) 25 mcg (0.025 %) spry 2 Sprays, 2 TIMES DAILY    gabapentin (NEURONTIN) 300 mg, Oral, 4 TIMES DAILY    insulin glargine,hum.rec.anlog (TOUJEO MAX U-300 SOLOSTAR SC) 80 Units, SubCUTAneous, 2 TIMES DAILY, 80 units in am, 34 units in pm     insulin lispro (HumaLOG U-100 Insulin) 100 unit/mL injection SubCUTAneous, Sliding scale     oxyCODONE IR (ROXICODONE) 5 mg, Oral, EVERY 12 HOURS    tamsulosin (FLOMAX) 0.8 mg, Oral, DAILY    tiotropium (Spiriva with HandiHaler) 18 mcg inhalation capsule 1 Capsule, Inhalation, DAILY    vitamin E acetate 800 Units, Oral, 2 TIMES DAILY         Signed By: Keo Ocampo MD     July 13, 2022

## 2022-07-13 NOTE — PROGRESS NOTES
Problem: Falls - Risk of  Goal: *Absence of Falls  Description: Document Torito Iniguez Fall Risk and appropriate interventions in the flowsheet. Outcome: Progressing Towards Goal  Note: Fall Risk Interventions:  Mobility Interventions: Bed/chair exit alarm,Patient to call before getting OOB         Medication Interventions: Bed/chair exit alarm,Patient to call before getting OOB,Teach patient to arise slowly    Elimination Interventions: Call light in reach,Toilet paper/wipes in reach,Toileting schedule/hourly rounds,Patient to call for help with toileting needs              Problem: Patient Education: Go to Patient Education Activity  Goal: Patient/Family Education  Outcome: Progressing Towards Goal     Problem: Pressure Injury - Risk of  Goal: *Prevention of pressure injury  Description: Document Kumar Scale and appropriate interventions in the flowsheet.   Outcome: Progressing Towards Goal  Note: Pressure Injury Interventions:  Sensory Interventions: Assess changes in LOC,Float heels,Minimize linen layers    Moisture Interventions: Internal/External urinary devices,Minimize layers    Activity Interventions: Increase time out of bed    Mobility Interventions: PT/OT evaluation    Nutrition Interventions: Document food/fluid/supplement intake,Offer support with meals,snacks and hydration,Discuss nutritional consult with provider    Friction and Shear Interventions: Minimize layers                Problem: Patient Education: Go to Patient Education Activity  Goal: Patient/Family Education  Outcome: Progressing Towards Goal     Problem: Diabetic Ulcer-Treatment  Goal: *Improvement of existing diabetic ulcer  Outcome: Progressing Towards Goal     Problem: Patient Education: Go to Patient Education Activity  Goal: Patient/Family Education  Outcome: Progressing Towards Goal

## 2022-07-13 NOTE — PROGRESS NOTES
1031. CM reviewed the clinical record. Patient is to MI home with [facility]. [facility] will provide services. Clinicals uploaded via [employer].      [address], [address]

## 2022-07-13 NOTE — PROGRESS NOTES
[facility]     Renal Daily Progress Note:     Admission Date: 2022     Subjective: Is better, eating okay. No nausea or vomiting. Back pain has lessened. Labs showed no significant proteinuria. Hemoglobin A1c less than 6. Patient's blood sugar has been controlled since medications changed by Dr. Katherine Miranda. He has had a 47 pound weight loss over the last 5 months through diet  Not short of breath, no chest pain. Making urine. Review of Systems  Pertinent items are noted in HPI.     Objective:     Visit Vitals  /62 (BP 1 Location: Right upper arm, BP Patient Position: Semi fowlers)   Pulse 73   Temp 98.1 °F (36.7 °C)   Resp 16   Ht 5' 11\" (1.803 m)   Wt 157.4 kg (347 lb)   SpO2 99%   BMI 48.40 kg/m²     Temp (24hrs), Av.4 °F (36.9 °C), Min:98.1 °F (36.7 °C), Max:98.7 °F (37.1 °C)        Intake/Output Summary (Last 24 hours) at 2022 1126  Last data filed at 2022 1042  Gross per 24 hour   Intake 2750 ml   Output 3650 ml   Net -900 ml     Current Facility-Administered Medications   Medication Dose Route Frequency    0.9% sodium chloride infusion  100 mL/hr IntraVENous CONTINUOUS    oxyCODONE IR (ROXICODONE) tablet 5 mg  5 mg Oral Q6H PRN    zinc oxide-white petrolatum 20-75 % topical paste   Topical BID    albuterol (PROVENTIL HFA, VENTOLIN HFA, PROAIR HFA) inhaler 2 Puff  2 Puff Inhalation Q6H PRN    ascorbic acid (vitamin C) (VITAMIN C) tablet 1,000 mg  1,000 mg Oral BID    aspirin chewable tablet 81 mg  81 mg Oral DAILY    b complex-vitamin c-folic acid 5mg (FOLBEE PLUS) tablet 1 Tablet  1 Tablet Oral DAILY    bumetanide (BUMEX) tablet 2 mg  2 mg Oral BID    cloNIDine HCL (CATAPRES) tablet 0.1 mg  0.1 mg Oral Q12H    colchicine tablet 0.6 mg  0.6 mg Oral DAILY    ferrous sulfate tablet 325 mg  1 Tablet Oral TID WITH MEALS    fluticasone propionate (FLONASE) 50 mcg/actuation nasal spray 2 Spray  2 Spray Both Nostrils DAILY    gabapentin (NEURONTIN) capsule 300 mg 300 mg Oral QID    insulin glargine (LANTUS) injection 80 Units  80 Units SubCUTAneous DAILY    tamsulosin (FLOMAX) capsule 0.4 mg  0.4 mg Oral DAILY    tiotropium bromide (SPIRIVA RESPIMAT) 2.5 mcg /actuation  2 Puff Inhalation DAILY    insulin lispro (HUMALOG) injection   SubCUTAneous AC&HS    glucose chewable tablet 16 g  4 Tablet Oral PRN    glucagon (GLUCAGEN) injection 1 mg  1 mg IntraMUSCular PRN    acetaminophen (TYLENOL) tablet 650 mg  650 mg Oral Q6H PRN    Or    acetaminophen (TYLENOL) suppository 650 mg  650 mg Rectal Q6H PRN    polyethylene glycol (MIRALAX) packet 17 g  17 g Oral DAILY PRN    ondansetron (ZOFRAN ODT) tablet 4 mg  4 mg Oral Q8H PRN    Or    ondansetron (ZOFRAN) injection 4 mg  4 mg IntraVENous Q6H PRN    heparin (porcine) injection 5,000 Units  5,000 Units SubCUTAneous Q8H    insulin glargine (LANTUS) injection 34 Units  34 Units SubCUTAneous QHS    oxyCODONE IR (ROXICODONE) tablet 5 mg  5 mg Oral Q12H       Physical Exam:  General appearance: alert, cooperative, no distress, appears older than stated age, morbidly obese  Head: Normocephalic, without obvious abnormality, atraumatic  Eyes: negative findings: anicteric sclera and conjunctivae and sclerae normal  Neck: supple, symmetrical, trachea midline, no adenopathy and thick neck, could not assess for JVD  Lungs: clear to auscultation bilaterally  Heart: regular rate and rhythm, no S3 or S4  Abdomen: soft, non-tender.  Bowel sounds normal. No masses,  no organomegaly, no CVAT  Extremities: Lower legs wrapped but there did appear to be some pretibial edema  Neurologic: Grossly normal     Data Review:     LABS:  Recent Labs     07/12/22  0733 07/11/22  0950    139   K 3.5 3.8    101   CO2 31 31   BUN 35* 37*   CREA 1.91* 2.53*   CA 8.4* 8.5   ALB 2.4* 2.9*     Recent Labs     07/12/22  0733 07/11/22  0950   WBC 7.4 12.4*   HGB 10.5* 11.5*   HCT 33.1* 36.7    204     Recent Labs     07/12/22  0315   [facility]   CREAU 45.00  44.00       Assessment:   Renal Specific Problems  Probable underlying CKD stage IIIa  Acute kidney injury  Probable left ureteral stone passage  Adult onset diabetes mellitus  Peripheral vascular disease related to diabetes, diabetic ulcers  Morbid obesity  COPD by history and likely has sleep apnea        Plan:     Obtain/ Order: labs/cultures/radiology/procedures:  renal panel        Therapeutic:    Continue IV fluids for now  Avoid nonsteroidals  If serum creatinine is back to baseline or less than 1.5 then can probably be discharged from a kidney standpoint with follow-up        Debbie Bernal MD    [phone]

## 2022-07-13 NOTE — PROGRESS NOTES
PROGRESS NOTE    Patient: Fawn Zepeda MRN: [id number]  SSN: [id number]    YOB: 1955  Age: 78 y.o. Sex: male      Admit Date: 7/11/2022    LOS: 2 days       Subjective     Chief Complaint   Patient presents with    Chest Pain    Flank Pain     kidneys       HPI: Patient is a 78y.o. year old male PMH MORBID OBESITY, DM, HTN, CHF, COPD, sleep apnea chronic venous stasis ulcers more in the left leg than right CAD, and recent MAURICIO who presents with flank and chest pain. Patient reports bilateral flank pain. He has a history of chronic back pain, but states this feels different. He is urinating less than normal and it is dark in color. He reports incontinence. He had a recent hospitalization for MAURICIO. He states he had left sided abdominal pain which has now resolved. Denies fever, shortness of breath, nausea, vomiting.     BUN: 35  Cr: 1.91  GFR: 35  PVR: 53     CT ABD PELV WO CONT  1. Moderate left hydronephrosis and ureterectasis. No obstructing, radiopaque,  left urinary calculus. This suggests a recently passed stone. 2. Cirrhosis and portal hypertension. No ascites. Liver protocol CT or MRI is recommended on a nonemergent basis for hepatocellular carcinoma screening. 7/13  Pt laying in bed comfortably. NAD  Flank pain improved significantly  Urinating, yellow color  Pt reports passing a lot of gas, no BM in 3 days with hard stools prior to his hospital stay. No history of abdominal surgery   Seen by urology and nephrology yesterday  Cr trending downward 1.58<1.91<2.53      Review of Systems   Constitutional: Negative. HENT: Negative. Eyes: Negative. Respiratory: Negative. Cardiovascular: Negative. Gastrointestinal: Positive for constipation. Genitourinary: Positive for flank pain. Musculoskeletal: Positive for back pain. Skin: Negative. Neurological: Negative. Endo/Heme/Allergies: Negative. Psychiatric/Behavioral: Negative.           Objective     Visit Vitals  /62 (BP 1 Location: Right upper arm, BP Patient Position: Semi fowlers)   Pulse 73   Temp 98.1 °F (36.7 °C)   Resp 16   Ht 5' 11\" (1.803 m)   Wt 157.4 kg (347 lb)   SpO2 99%   BMI 48.40 kg/m²    O2 Flow Rate (L/min): 3 l/min O2 Device: Nasal cannula    Physical Exam:   Physical Exam  Vitals reviewed. Constitutional:       Appearance: Normal appearance. He is obese. HENT:      Head: Normocephalic and atraumatic. Cardiovascular:      Rate and Rhythm: Normal rate and regular rhythm. Heart sounds: Normal heart sounds. Pulmonary:      Effort: Pulmonary effort is normal.      Breath sounds: Normal breath sounds. Abdominal:      General: Bowel sounds are normal.   Musculoskeletal:         General: Normal range of motion. Neurological:      General: No focal deficit present. Mental Status: He is alert and oriented to person, place, and time. Psychiatric:         Mood and Affect: Mood normal.         Intake & Output:  Current Shift: No intake/output data recorded. Last three shifts: 07/11 1901 - 07/13 0700  In: 2950 [P.O.:1750; I.V.:1200]  Out: 3700 [Urine:3700]    Lab/Data Review: All lab results for the last 24 hours reviewed. 24 Hour Results:    Recent Results (from the past 24 hour(s))   GLUCOSE, POC    Collection Time: 07/12/22 11:44 AM   Result Value Ref Range    Glucose (POC) 128 (H) 65 - 117 mg/dL    Performed by Mendoza Nova    GLUCOSE, POC    Collection Time: 07/12/22  4:47 PM   Result Value Ref Range    Glucose (POC) 97 65 - 117 mg/dL    Performed by Mendoza Nova    GLUCOSE, POC    Collection Time: 07/12/22  9:20 PM   Result Value Ref Range    Glucose (POC) 141 (H) 65 - 117 mg/dL    Performed by Alexander Winter    GLUCOSE, POC    Collection Time: 07/13/22  8:48 AM   Result Value Ref Range    Glucose (POC) 83 65 - 117 mg/dL    Performed by Ansley Jonas          Imaging:    CT ABD PELV WO CONT   Final Result   1. Moderate left hydronephrosis and ureterectasis.  No obstructing, radiopaque,   left urinary calculus. This suggests a recently passed stone. 2. Cirrhosis and portal hypertension. No ascites. Liver protocol CT or MRI is   recommended on a nonemergent basis for hepatocellular carcinoma screening. US RETROPERITONEUM COMP   Final Result   No right hydronephrosis. Nondiagnostic imaging of the left kidney. XR CHEST PORT   Final Result   No acute process. Assessment   Acute kidney injury   Left  hydronephrosis  Possible hx nephrolithiasis  Diabetic Ulcers, bilaterally  Chronic venous stasis  Leg wounds  History of CHF, diastolic dysfunction   Image quality: suboptimal. The view(s) performed were parasternal, apical, subcostal and suprasternal. Color flow Doppler was performed and pulse wave and/or continuous wave Doppler was performed. No contrast was given. 1. This was a technically difficult study  2. The aortic and tricuspid valve were unable to be visualized. 3. Multisegment wall motion abnormality detected on this study. The overall EF was 30-35%. 4. No pericardial effusion seen on this study.   COPD  Type 2 DM  HTN  Gout  Sleep apnea  Morbid obesity    Plan   Continue meds:  ASA 81 mg po daily  Clonidine 0.1mg BID  Bumetanide 2 mg BID  Colchicine 0.6 mg po daily  Ferrous sulfate 325 mg TID  Flonase 2 puffs daily  Gabapentin 300 mg QID  Heparin 5,000 units [id number]  Lantus 34 units subq qHS  Lantus 80 units subq daily  Humalog subq QID, QHS  Oxycodone 5 mg BID  Flomax 0.4 mg po daily  Spiriva 2 puffs daily      Vitals stable  Cr trending downward 1.58<1.91<2.53  Discontinue IV fluids   Monitor renal function   Miralax prn   Avoid NSAIDs  Outpatient stone work up to determine type of nephrolithiasis, Dr. Karyn Holguin outpatient follow-up Urology        Current Facility-Administered Medications:     oxyCODONE IR (ROXICODONE) tablet 5 mg, 5 mg, Oral, Q6H PRN, Moni Patel MD, 5 mg at 07/13/22 9887    zinc oxide-white petrolatum 20-75 % topical paste, , Topical, BID, Shayne Patel MD, Given at 07/12/22 2100    albuterol (PROVENTIL HFA, VENTOLIN HFA, PROAIR HFA) inhaler 2 Puff, 2 Puff, Inhalation, Q6H PRN, Moni Patel MD    ascorbic acid (vitamin C) (VITAMIN C) tablet 1,000 mg, 1,000 mg, Oral, BID, Moni Patel MD, 1,000 mg at 07/12/22 2232    aspirin chewable tablet 81 mg, 81 mg, Oral, DAILY, Moni Patel MD, 81 mg at 07/12/22 0858    b complex-vitamin c-folic acid 5mg (FOLBEE PLUS) tablet 1 Tablet, 1 Tablet, Oral, DAILY, Moni Patel MD, 1 Tablet at 07/12/22 0959    bumetanide (BUMEX) tablet 2 mg, 2 mg, Oral, BID, Moni Patel MD, 2 mg at 07/12/22 2232    cloNIDine HCL (CATAPRES) tablet 0.1 mg, 0.1 mg, Oral, Q12H, Moni Patel MD, 0.1 mg at 07/12/22 2232    colchicine tablet 0.6 mg, 0.6 mg, Oral, DAILY, Moni Patel MD, 0.6 mg at 07/12/22 9813    ferrous sulfate tablet 325 mg, 1 Tablet, Oral, TID WITH MEALS, Moni Patel MD, 325 mg at 07/12/22 1737    fluticasone propionate (FLONASE) 50 mcg/actuation nasal spray 2 Spray, 2 Spray, Both Nostrils, DAILY, Moni Patel MD, 2 Spray at 07/12/22 1238    gabapentin (NEURONTIN) capsule 300 mg, 300 mg, Oral, QID, Moni Patel MD, 300 mg at 07/12/22 2154    insulin glargine (LANTUS) injection 80 Units, 80 Units, SubCUTAneous, DAILY, Moni Patel MD, 80 Units at 07/12/22 0858    tamsulosin (FLOMAX) capsule 0.4 mg, 0.4 mg, Oral, DAILY, Moni Patel MD, 0.4 mg at 07/12/22 0858    tiotropium bromide (SPIRIVA RESPIMAT) 2.5 mcg /actuation, 2 Puff, Inhalation, DAILY, Moni Patel MD, 2 Puff at 07/13/22 0800    insulin lispro (HUMALOG) injection, , SubCUTAneous, AC&HS, Moni Patel MD, 3 Units at 07/11/22 1641    glucose chewable tablet 16 g, 4 Tablet, Oral, PRN, Moni Patel MD    glucagon (GLUCAGEN) injection 1 mg, 1 mg, IntraMUSCular, PRN, Moni Patel MD    acetaminophen (TYLENOL) tablet 650 mg, 650 mg, Oral, Q6H PRN, 650 mg at 07/11/22 1639 **OR** acetaminophen (TYLENOL) suppository 650 mg, 650 mg, Rectal, Q6H PRN, Gladys Patel MD    polyethylene glycol (MIRALAX) packet 17 g, 17 g, Oral, DAILY PRN, lGadys Patel MD    ondansetron (ZOFRAN ODT) tablet 4 mg, 4 mg, Oral, Q8H PRN **OR** ondansetron (ZOFRAN) injection 4 mg, 4 mg, IntraVENous, Q6H PRN, Moni Patel MD    heparin (porcine) injection 5,000 Units, 5,000 Units, SubCUTAneous, Q8H, Moni Patel MD, 5,000 Units at 07/13/22 0511    insulin glargine (LANTUS) injection 34 Units, 34 Units, SubCUTAneous, QHS, Moni Patel MD, 34 Units at 07/12/22 2232    oxyCODONE IR (ROXICODONE) tablet 5 mg, 5 mg, Oral, Q12H, Moni Patel MD, 5 mg at 07/12/22 2153    Current Outpatient Medications   Medication Instructions    albuterol (ProAir HFA) 90 mcg/actuation inhaler 1 Puff, Inhalation, EVERY 4 HOURS AS NEEDED    ammonium lactate (AMLACTIN) 12 % topical cream Topical, AS NEEDED, rub in to affected area well    ascorbic acid (vitamin C) (VITAMIN C) 1,000 mg, Oral, 2 TIMES DAILY    aspirin 81 mg, Oral, DAILY    b complex vitamins tablet 1 Tablet, Oral, DAILY    bumetanide (BUMEX) 2 mg, Oral, 2 TIMES DAILY    cloNIDine HCL (CATAPRES) 0.1 mg tablet 1 Tablet, EVERY 12 HOURS    colchicine 0.6 mg, Oral, DAILY    ferrous sulfate (Iron) 325 mg (65 mg iron) tablet Oral, 2 TIMES DAILY AFTER MEALS    flunisolide (NASAREL) 25 mcg (0.025 %) spry 2 Sprays, 2 TIMES DAILY    gabapentin (NEURONTIN) 300 mg, Oral, 4 TIMES DAILY    insulin glargine,hum.rec.anlog (TOUJEO MAX U-300 SOLOSTAR SC) 80 Units, SubCUTAneous, 2 TIMES DAILY, 80 units in am, 34 units in pm     insulin lispro (HumaLOG U-100 Insulin) 100 unit/mL injection SubCUTAneous, Sliding scale     oxyCODONE IR (ROXICODONE) 5 mg, Oral, EVERY 12 HOURS    tamsulosin (FLOMAX) 0.8 mg, Oral, DAILY    tiotropium (Spiriva with HandiHaler) 18 mcg inhalation capsule 1 Capsule, Inhalation, DAILY    vitamin E acetate 800 Units, Oral, 2 TIMES DAILY         Signed By: Loli Lay     July 13, 2022

## 2022-07-14 VITALS
SYSTOLIC BLOOD PRESSURE: 124 MMHG | TEMPERATURE: 98.5 F | HEART RATE: 72 BPM | BODY MASS INDEX: 44.1 KG/M2 | WEIGHT: 315 LBS | DIASTOLIC BLOOD PRESSURE: 67 MMHG | HEIGHT: 71 IN | OXYGEN SATURATION: 100 % | RESPIRATION RATE: 18 BRPM

## 2022-07-14 LAB
ALBUMIN SERPL-MCNC: 2.3 G/DL (ref 3.5–5)
ANION GAP SERPL CALC-SCNC: 7 MMOL/L (ref 5–15)
BUN SERPL-MCNC: 30 MG/DL (ref 6–20)
BUN/CREAT SERPL: 21 (ref 12–20)
CA-I BLD-MCNC: 8.6 MG/DL (ref 8.5–10.1)
CHLORIDE SERPL-SCNC: 105 MMOL/L (ref 97–108)
CO2 SERPL-SCNC: 31 MMOL/L (ref 21–32)
CREAT SERPL-MCNC: 1.41 MG/DL (ref 0.7–1.3)
GLUCOSE BLD STRIP.AUTO-MCNC: 144 MG/DL (ref 65–117)
GLUCOSE BLD STRIP.AUTO-MCNC: 73 MG/DL (ref 65–117)
GLUCOSE SERPL-MCNC: 83 MG/DL (ref 65–100)
PERFORMED BY, TECHID: ABNORMAL
PERFORMED BY, TECHID: NORMAL
PHOSPHATE SERPL-MCNC: 3.5 MG/DL (ref 2.6–4.7)
POTASSIUM SERPL-SCNC: 3.5 MMOL/L (ref 3.5–5.1)
SODIUM SERPL-SCNC: 143 MMOL/L (ref 136–145)

## 2022-07-14 PROCEDURE — 82962 GLUCOSE BLOOD TEST: CPT

## 2022-07-14 PROCEDURE — 94761 N-INVAS EAR/PLS OXIMETRY MLT: CPT

## 2022-07-14 PROCEDURE — 94640 AIRWAY INHALATION TREATMENT: CPT

## 2022-07-14 PROCEDURE — 74011250636 HC RX REV CODE- 250/636: Performed by: FAMILY MEDICINE

## 2022-07-14 PROCEDURE — 36415 COLL VENOUS BLD VENIPUNCTURE: CPT

## 2022-07-14 PROCEDURE — 74011250637 HC RX REV CODE- 250/637: Performed by: FAMILY MEDICINE

## 2022-07-14 PROCEDURE — 74011636637 HC RX REV CODE- 636/637: Performed by: FAMILY MEDICINE

## 2022-07-14 PROCEDURE — 77010033678 HC OXYGEN DAILY

## 2022-07-14 PROCEDURE — 80069 RENAL FUNCTION PANEL: CPT

## 2022-07-14 RX ADMIN — COLCHICINE 0.6 MG: 0.6 TABLET, FILM COATED ORAL at 09:02

## 2022-07-14 RX ADMIN — WHITE PETROLATUM,ZINC OXIDE: 20; 75 PASTE TOPICAL at 12:10

## 2022-07-14 RX ADMIN — HEPARIN SODIUM 5000 UNITS: 5000 INJECTION INTRAVENOUS; SUBCUTANEOUS at 13:48

## 2022-07-14 RX ADMIN — OXYCODONE 5 MG: 5 TABLET ORAL at 02:30

## 2022-07-14 RX ADMIN — TIOTROPIUM BROMIDE INHALATION SPRAY 2 PUFF: 3.12 SPRAY, METERED RESPIRATORY (INHALATION) at 08:30

## 2022-07-14 RX ADMIN — GABAPENTIN 300 MG: 300 CAPSULE ORAL at 09:02

## 2022-07-14 RX ADMIN — FLUTICASONE PROPIONATE 2 SPRAY: 50 SPRAY, METERED NASAL at 09:06

## 2022-07-14 RX ADMIN — OXYCODONE HYDROCHLORIDE AND ACETAMINOPHEN 1000 MG: 500 TABLET ORAL at 09:02

## 2022-07-14 RX ADMIN — BUMETANIDE 2 MG: 1 TABLET ORAL at 09:02

## 2022-07-14 RX ADMIN — OXYCODONE 5 MG: 5 TABLET ORAL at 09:02

## 2022-07-14 RX ADMIN — POLYETHYLENE GLYCOL 3350 17 G: 17 POWDER, FOR SOLUTION ORAL at 12:06

## 2022-07-14 RX ADMIN — Medication 1 TABLET: at 11:57

## 2022-07-14 RX ADMIN — INSULIN GLARGINE 80 UNITS: 100 INJECTION, SOLUTION SUBCUTANEOUS at 09:02

## 2022-07-14 RX ADMIN — TAMSULOSIN HYDROCHLORIDE 0.4 MG: 0.4 CAPSULE ORAL at 09:02

## 2022-07-14 RX ADMIN — FERROUS SULFATE TAB 325 MG (65 MG ELEMENTAL FE) 325 MG: 325 (65 FE) TAB at 09:02

## 2022-07-14 RX ADMIN — INSULIN LISPRO 3 UNITS: 100 INJECTION, SOLUTION INTRAVENOUS; SUBCUTANEOUS at 11:57

## 2022-07-14 RX ADMIN — FERROUS SULFATE TAB 325 MG (65 MG ELEMENTAL FE) 325 MG: 325 (65 FE) TAB at 11:57

## 2022-07-14 RX ADMIN — GABAPENTIN 300 MG: 300 CAPSULE ORAL at 12:06

## 2022-07-14 RX ADMIN — ASPIRIN 81 MG 81 MG: 81 TABLET ORAL at 09:02

## 2022-07-14 RX ADMIN — HEPARIN SODIUM 5000 UNITS: 5000 INJECTION INTRAVENOUS; SUBCUTANEOUS at 05:11

## 2022-07-14 NOTE — DISCHARGE SUMMARY
Discharge Summary       PATIENT ID: Nilda Barriga  MRN: [id number]   YOB: 1955    DATE OF ADMISSION: 7/11/2022  9:16 AM    DATE OF DISCHARGE:   PRIMARY CARE PROVIDER: Francis Casper MD     ATTENDING PHYSICIAN: Janine Patel  DISCHARGING PROVIDER: Janine Patel      CONSULTATIONS: IP CONSULT TO NEPHROLOGY  IP CONSULT TO UROLOGY    PROCEDURES/SURGERIES: * No surgery found *    ADMITTING DIAGNOSES:    Patient Active Problem List    Diagnosis Date Noted    MAURICIO (acute kidney injury) ([address] 07/11/2022    Non-pressure chronic ulcer of other part of right lower leg with fat layer exposed ([address] 06/15/2022    Calf ulcer, left, with fat layer exposed ([address] 04/06/2022    Ankle ulcer, right, with fat layer exposed ([address] 04/06/2022    Ulcer of right heel, with fat layer exposed ([address] 03/23/2022    Ulcer of left foot, with fat layer exposed ([address] 03/09/2022    Non-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 ([address] 03/02/2022    Venous ulcer ([address] 06/17/2021    Cellulitis and abscess of right leg 06/02/2021    Cellulitis 06/01/2021    COVID-19 01/04/2021    Type 2 diabetes mellitus with diabetic polyneuropathy, with long-term current use of insulin ([address] 11/23/2020    Hyperkeratosis 11/23/2020    Onychomycosis 11/23/2020    Localized edema 09/21/2020    Idiopathic chronic venous hypertension of left lower extremity with ulcer ([address] 09/14/2020       DISCHARGE DIAGNOSES / PLAN:       Acute kidney injury   Left  hydronephrosis  Possible hx nephrolithiasis  Diabetic Ulcers, bilaterally  Chronic venous stasis  Leg wounds  History of CHF, diastolic dysfunction   Image quality: suboptimal. The view(s) performed were parasternal, apical, subcostal and suprasternal. Color flow Doppler was performed and pulse wave and/or continuous wave Doppler was performed. No contrast was given. 1. This was a technically difficult study  2.  The aortic and tricuspid valve were unable to be visualized. 3. Multisegment wall motion abnormality detected on this study. The overall EF was 30-35%. 4. No pericardial effusion seen on this study. COPD  Type 2 DM  HTN  Gout  Sleep apnea  Morbid obesity      DISCHARGE MEDICATIONS:  Current Discharge Medication List      CONTINUE these medications which have NOT CHANGED    Details   insulin glargine,hum.rec.anlog (TOUJEO MAX U-300 SOLOSTAR SC) 80 Units by SubCUTAneous route two (2) times a day. 80 units in am, 34 units in pm      b complex vitamins tablet Take 1 Tablet by mouth daily. ammonium lactate (AMLACTIN) 12 % topical cream Apply  to affected area as needed for Other (xerosis). rub in to affected area well  Indications: dry skin  Qty: 280 g, Refills: 0      vitamin E, dl,tocopheryl acet, (vitamin E acetate) 400 unit capsule Take 2 Capsules by mouth two (2) times a day. Qty: 100 Capsule, Refills: 0      ascorbic acid, vitamin C, (Vitamin C) 500 mg tablet Take 1,000 mg by mouth two (2) times a day. Indications: inadequate vitamin C      tamsulosin (Flomax) 0.4 mg capsule Take 0.8 mg by mouth daily. tiotropium (Spiriva with HandiHaler) 18 mcg inhalation capsule Take 1 Capsule by inhalation daily. bumetanide (BUMEX) 2 mg tablet Take 2 mg by mouth two (2) times a day. ferrous sulfate (Iron) 325 mg (65 mg iron) tablet Take  by mouth two (2) times daily (after meals). aspirin 81 mg chewable tablet Take 81 mg by mouth daily. flunisolide (NASAREL) 25 mcg (0.025 %) spry 2 Sprays two (2) times a day. albuterol (ProAir HFA) 90 mcg/actuation inhaler Take 1 Puff by inhalation every four (4) hours as needed for Wheezing or Shortness of Breath. colchicine 0.6 mg tablet Take 0.6 mg by mouth daily. gabapentin (NEURONTIN) 300 mg capsule Take 300 mg by mouth four (4) times daily. oxyCODONE IR (ROXICODONE) 5 mg immediate release tablet Take 5 mg by mouth every twelve (12) hours.       insulin lispro (HumaLOG U-100 Insulin) 100 unit/mL injection by SubCUTAneous route. Sliding scale         STOP taking these medications       cloNIDine HCL (CATAPRES) 0.1 mg tablet Comments:   Reason for Stopping:                 NOTIFY YOUR PHYSICIAN FOR ANY OF THE FOLLOWING:   Fever over 101 degrees for 24 hours. Chest pain, shortness of breath, fever, chills, nausea, vomiting, diarrhea, change in mentation, falling, weakness, bleeding. Severe pain or pain not relieved by medications. Or, any other signs or symptoms that you may have questions about. DISPOSITION:  x  Home With:   OT  PT  HH  RN       Long term SNF/Inpatient Rehab    Independent/assisted living    Hospice    Other:       PATIENT CONDITION AT DISCHARGE: Stable      PHYSICAL EXAMINATION AT DISCHARGE:  General:          Alert, cooperative, no distress, appears stated age. HEENT:           Atraumatic, anicteric sclerae, pink conjunctivae                          No oral ulcers, mucosa moist, throat clear, dentition fair  Neck:               Supple, symmetrical  Lungs:             Clear to auscultation bilaterally. No Wheezing or Rhonchi. No rales. Chest wall:      No tenderness  No Accessory muscle use. Heart:              Regular  rhythm,  No  murmur   No edema  Abdomen:        Soft, non-tender. Not distended. Bowel sounds normal  Extremities:     No cyanosis. No clubbing,                            Skin turgor normal, Capillary refill normal  Skin:                Not pale. Not Jaundiced  No rashes   Psych:             Not anxious or agitated. Neurologic:      Alert, moves all extremities, answers questions appropriately and responds to commands     CT ABD PELV WO CONT   Final Result   1. Moderate left hydronephrosis and ureterectasis. No obstructing, radiopaque,   left urinary calculus. This suggests a recently passed stone. 2. Cirrhosis and portal hypertension. No ascites.  Liver protocol CT or MRI is   recommended on a nonemergent basis for hepatocellular carcinoma screening. US RETROPERITONEUM COMP   Final Result   No right hydronephrosis. Nondiagnostic imaging of the left kidney. XR CHEST PORT   Final Result   No acute process.               Recent Results (from the past 24 hour(s))   GLUCOSE, POC    Collection Time: 07/13/22 11:18 AM   Result Value Ref Range    Glucose (POC) 141 (H) 65 - 117 mg/dL    Performed by [employer]    RENAL FUNCTION PANEL    Collection Time: 07/13/22 11:19 AM   Result Value Ref Range    Sodium 141 136 - 145 mmol/L    Potassium 3.6 3.5 - 5.1 mmol/L    Chloride 103 97 - 108 mmol/L    CO2 32 21 - 32 mmol/L    Anion gap 6 5 - 15 mmol/L    Glucose 144 (H) 65 - 100 mg/dL    BUN 30 (H) 6 - 20 mg/dL    Creatinine 1.58 (H) 0.70 - 1.30 mg/dL    BUN/Creatinine ratio 19 12 - 20      GFR est AA 53 (L) >60 ml/min/1.73m2    GFR est non-AA 44 (L) >60 ml/min/1.73m2    Calcium 8.4 (L) 8.5 - 10.1 mg/dL    Phosphorus 3.5 2.6 - 4.7 mg/dL    Albumin 2.3 (L) 3.5 - 5.0 g/dL   GLUCOSE, POC    Collection Time: 07/13/22  4:21 PM   Result Value Ref Range    Glucose (POC) 106 65 - 117 mg/dL    Performed by Carrie Martinez    GLUCOSE, POC    Collection Time: 07/13/22  7:11 PM   Result Value Ref Range    Glucose (POC) 129 (H) 65 - 117 mg/dL    Performed by Regina Francis    RENAL FUNCTION PANEL    Collection Time: 07/14/22  6:20 AM   Result Value Ref Range    Sodium 143 136 - 145 mmol/L    Potassium 3.5 3.5 - 5.1 mmol/L    Chloride 105 97 - 108 mmol/L    CO2 31 21 - 32 mmol/L    Anion gap 7 5 - 15 mmol/L    Glucose 83 65 - 100 mg/dL    BUN 30 (H) 6 - 20 mg/dL    Creatinine 1.41 (H) 0.70 - 1.30 mg/dL    BUN/Creatinine ratio 21 (H) 12 - 20      GFR est AA >60 >60 ml/min/1.73m2    GFR est non-AA 50 (L) >60 ml/min/1.73m2    Calcium 8.6 8.5 - 10.1 mg/dL    Phosphorus 3.5 2.6 - 4.7 mg/dL    Albumin 2.3 (L) 3.5 - 5.0 g/dL   GLUCOSE, POC    Collection Time: 07/14/22  8:27 AM   Result Value Ref Range    Glucose (POC) 73 65 - 117 mg/dL    Performed by [facility] COURSE:    Patient is  y.o. year old male PMH MORBID OBESITY, DM, HTN, CHF, COPD, sleep apnea chronic venous stasis ulcers more in the left leg than right CAD, and recent MAURICIO who presents with flank and chest pain. Patient reports bilateral flank pain. He has a history of chronic back pain, but states this feels different. He is urinating less than normal and it is dark in color. He reports incontinence. He had a recent hospitalization for MAURICIO. He states he had left sided abdominal pain which has now resolved. Denies fever, shortness of breath, nausea, vomiting.     BUN: 35  Cr: 1.91  GFR: 35  PVR: 53     CT ABD PELV WO CONT  1. Moderate left hydronephrosis and ureterectasis. No obstructing, radiopaque,  left urinary calculus. This suggests a recently passed stone. 2. Cirrhosis and portal hypertension. No ascites. Liver protocol CT or MRI is recommended on a nonemergent basis for hepatocellular carcinoma screening.     7/13  Pt laying in bed comfortably. NAD  Flank pain improved significantly  Urinating, yellow color  Pt reports passing a lot of gas, no BM in 3 days with hard stools prior to his hospital stay.  No history of abdominal surgery   Seen by urology and nephrology yesterday  Cr trending downward 1.58<1.91<2.53    Today after hydration renal function back to baseline    Patient denies any chest pain shortness of breath nausea vomiting discharged home in stable condition    Follow-up with me in 1 to 2 weeks          Signed:   Regine Iyer MD  7/14/2022  11:15 AM

## 2022-07-14 NOTE — PROGRESS NOTES
Discharge plan of care/case management plan validated with provider discharge order. Patient discharged home with home health. IVs taken out. Discharge Summary given with no questions or concerns. Patient wheeled down safely.

## 2022-07-14 NOTE — PROGRESS NOTES
PROGRESS NOTE    Patient: Fawn Zepeda MRN: [id number]  SSN: [id number]    YOB: 1955  Age: 78 y.o. Sex: male      Admit Date: 7/11/2022    LOS: 3 days       Subjective     Chief Complaint   Patient presents with    Chest Pain    Flank Pain     kidneys       HPI: Patient is An[age] y.o. year old male PMH MORBID OBESITY, DM, HTN, CHF, COPD, sleep apnea chronic venous stasis ulcers more in the left leg than right CAD, and recent MAURICIO who presents with flank and chest pain. Patient reports bilateral flank pain. He has a history of chronic back pain, but states this feels different. He is urinating less than normal and it is dark in color. He reports incontinence. He had a recent hospitalization for MAURICIO. He states he had left sided abdominal pain which has now resolved. Denies fever, shortness of breath, nausea, vomiting.     BUN: 35  Cr: 1.91  GFR: 35  PVR: 53     CT ABD PELV WO CONT  1. Moderate left hydronephrosis and ureterectasis. No obstructing, radiopaque,  left urinary calculus. This suggests a recently passed stone. 2. Cirrhosis and portal hypertension. No ascites. Liver protocol CT or MRI is recommended on a nonemergent basis for hepatocellular carcinoma screening.     7/13  Pt laying in bed comfortably. NAD  Flank pain improved significantly  Urinating, yellow color  Pt reports passing a lot of gas, no BM in 3 days with hard stools prior to his hospital stay. No history of abdominal surgery   Seen by urology and nephrology yesterday  Cr trending downward 1.58<1.91<2.53    7/14  Pt sitting in bed eating breakfast, NAD  Reports small improvement in pain since yesterday  He is experiencing less gas today. No BM 4 days. Review of Systems   Constitutional: Negative. HENT: Negative. Eyes: Negative. Respiratory: Negative. Cardiovascular: Negative. Gastrointestinal: Positive for constipation. Genitourinary: Negative. Musculoskeletal: Positive for back pain.    Skin: Negative. Neurological: Negative. Endo/Heme/Allergies: Negative. Psychiatric/Behavioral: Negative. Objective     Visit Vitals  /67 (BP 1 Location: Left upper arm, BP Patient Position: At rest)   Pulse 72   Temp 98.5 °F (36.9 °C)   Resp 18   Ht 5' 11\" (1.803 m)   Wt 157.4 kg (347 lb)   SpO2 100%   BMI 48.40 kg/m²    O2 Flow Rate (L/min): 3 l/min O2 Device: Nasal cannula    Physical Exam:   Physical Exam  Constitutional:       Appearance: Normal appearance. He is obese. HENT:      Head: Normocephalic and atraumatic. Cardiovascular:      Rate and Rhythm: Normal rate and regular rhythm. Pulses: Normal pulses. Pulmonary:      Effort: Pulmonary effort is normal.      Breath sounds: Normal breath sounds. Abdominal:      General: Abdomen is flat. Bowel sounds are normal.      Palpations: Abdomen is soft. Musculoskeletal:         General: Normal range of motion. Skin:     General: Skin is warm and dry. Neurological:      General: No focal deficit present. Mental Status: He is alert and oriented to person, place, and time. Intake & Output:  Current Shift: No intake/output data recorded. Last three shifts: 07/12 1901 - 07/14 0700  In: 3761.7 [P.O.:1951; I.V.:1811.7]  Out: 5450 [WNROZ:6021]    Lab/Data Review: All lab results for the last 24 hours reviewed.        24 Hour Results:    Recent Results (from the past 24 hour(s))   GLUCOSE, POC    Collection Time: 07/13/22  8:48 AM   Result Value Ref Range    Glucose (POC) 83 65 - 117 mg/dL    Performed by Ansley Jonas    GLUCOSE, POC    Collection Time: 07/13/22 11:18 AM   Result Value Ref Range    Glucose (POC) 141 (H) 65 - 117 mg/dL    Performed by Mary Lou Fan    RENAL FUNCTION PANEL    Collection Time: 07/13/22 11:19 AM   Result Value Ref Range    Sodium 141 136 - 145 mmol/L    Potassium 3.6 3.5 - 5.1 mmol/L    Chloride 103 97 - 108 mmol/L    CO2 32 21 - 32 mmol/L    Anion gap 6 5 - 15 mmol/L    Glucose 144 (H) 65 - 100 mg/dL    BUN 30 (H) 6 - 20 mg/dL    Creatinine 1.58 (H) 0.70 - 1.30 mg/dL    BUN/Creatinine ratio 19 12 - 20      GFR est AA 53 (L) >60 ml/min/1.73m2    GFR est non-AA 44 (L) >60 ml/min/1.73m2    Calcium 8.4 (L) 8.5 - 10.1 mg/dL    Phosphorus 3.5 2.6 - 4.7 mg/dL    Albumin 2.3 (L) 3.5 - 5.0 g/dL   GLUCOSE, POC    Collection Time: 07/13/22  4:21 PM   Result Value Ref Range    Glucose (POC) 106 65 - 117 mg/dL    Performed by Gideon An    GLUCOSE, POC    Collection Time: 07/13/22  7:11 PM   Result Value Ref Range    Glucose (POC) 129 (H) 65 - 117 mg/dL    Performed by Dayan Landin    RENAL FUNCTION PANEL    Collection Time: 07/14/22  6:20 AM   Result Value Ref Range    Sodium 143 136 - 145 mmol/L    Potassium 3.5 3.5 - 5.1 mmol/L    Chloride 105 97 - 108 mmol/L    CO2 31 21 - 32 mmol/L    Anion gap 7 5 - 15 mmol/L    Glucose 83 65 - 100 mg/dL    BUN 30 (H) 6 - 20 mg/dL    Creatinine 1.41 (H) 0.70 - 1.30 mg/dL    BUN/Creatinine ratio 21 (H) 12 - 20      GFR est AA >60 >60 ml/min/1.73m2    GFR est non-AA 50 (L) >60 ml/min/1.73m2    Calcium 8.6 8.5 - 10.1 mg/dL    Phosphorus 3.5 2.6 - 4.7 mg/dL    Albumin 2.3 (L) 3.5 - 5.0 g/dL   GLUCOSE, POC    Collection Time: 07/14/22  8:27 AM   Result Value Ref Range    Glucose (POC) 73 65 - 117 mg/dL    Performed by Madhu Vasquez          Imaging:    CT ABD PELV WO CONT   Final Result   1. Moderate left hydronephrosis and ureterectasis. No obstructing, radiopaque,   left urinary calculus. This suggests a recently passed stone. 2. Cirrhosis and portal hypertension. No ascites. Liver protocol CT or MRI is   recommended on a nonemergent basis for hepatocellular carcinoma screening. US RETROPERITONEUM COMP   Final Result   No right hydronephrosis. Nondiagnostic imaging of the left kidney. XR CHEST PORT   Final Result   No acute process.                 Assessment     Acute kidney injury   Left  hydronephrosis  Possible hx nephrolithiasis  Diabetic Ulcers, bilaterally  Chronic venous stasis  Leg wounds  History of CHF, diastolic dysfunction   Image quality: suboptimal. The view(s) performed were parasternal, apical, subcostal and suprasternal. Color flow Doppler was performed and pulse wave and/or continuous wave Doppler was performed. No contrast was given. 1. This was a technically difficult study  2. The aortic and tricuspid valve were unable to be visualized. 3. Multisegment wall motion abnormality detected on this study. The overall EF was 30-35%. 4. No pericardial effusion seen on this study.   COPD  Type 2 DM  HTN  Gout  Sleep apnea  Morbid obesity    Plan   Continue meds:  ASA 81 mg po daily  Clonidine 0.1mg BID  Bumetanide 2 mg BID  Colchicine 0.6 mg po daily  Ferrous sulfate 325 mg TID  Flonase 2 puffs daily  Gabapentin 300 mg QID  Heparin 5,000 units [id number]  Lantus 34 units subq qHS  Lantus 80 units subq daily  Humalog subq QID, QHS  Oxycodone 5 mg BID  Flomax 0.4 mg po daily  Spiriva 2 puffs daily        Vitals stable  Cr trending downward 1.41<1.58<1.91<2.53  Monitor renal function   Continue IV fluids  Miralax, simethicone prn   Avoid NSAIDs  Outpatient stone work up to determine type of nephrolithiasis, Dr. Angela Hopper outpatient follow-up Urology     Repeat  the labs in the morning      Current Facility-Administered Medications:     0.9% sodium chloride infusion, 100 mL/hr, IntraVENous, CONTINUOUS, Jani Gardner MD, Last Rate: 100 mL/hr at 07/13/22 2329, 100 mL/hr at 07/13/22 2329    simethicone (MYLICON) tablet 80 mg, 80 mg, Oral, QID PRN, Moni Patel MD, 80 mg at 07/13/22 2057    oxyCODONE IR (ROXICODONE) tablet 5 mg, 5 mg, Oral, Q6H PRN, Moni Patel MD, 5 mg at 07/14/22 0230    zinc oxide-white petrolatum 20-75 % topical paste, , Topical, BID, Dillon Patel MD, Given at 07/13/22 2104    albuterol (PROVENTIL HFA, VENTOLIN HFA, PROAIR HFA) inhaler 2 Puff, 2 Puff, Inhalation, Q6H PRN, Dillon Patel MD    ascorbic acid (vitamin C) (VITAMIN C) tablet 1,000 mg, 1,000 mg, Oral, BID, Moni Patel MD, 1,000 mg at 07/13/22 2057    aspirin chewable tablet 81 mg, 81 mg, Oral, DAILY, Moni Patel MD, 81 mg at 07/13/22 0947    b complex-vitamin c-folic acid 5mg (FOLBEE PLUS) tablet 1 Tablet, 1 Tablet, Oral, DAILY, Moni Patel MD, 1 Tablet at 07/13/22 1507    bumetanide (BUMEX) tablet 2 mg, 2 mg, Oral, BID, Moni Patel MD, 2 mg at 07/13/22 2329    colchicine tablet 0.6 mg, 0.6 mg, Oral, DAILY, Moni Patel MD, 0.6 mg at 07/13/22 4947    ferrous sulfate tablet 325 mg, 1 Tablet, Oral, TID WITH MEALS, Moni Patel MD, 325 mg at 07/13/22 1726    fluticasone propionate (FLONASE) 50 mcg/actuation nasal spray 2 Spray, 2 Spray, Both Nostrils, DAILY, Moni Patel MD, 2 [place] at 07/13/22 0948    gabapentin (NEURONTIN) capsule 300 mg, 300 mg, Oral, QID, Moni Patel MD, 300 mg at 07/13/22 2058    insulin glargine (LANTUS) injection 80 Units, 80 Units, SubCUTAneous, DAILY, Moni Patel MD, 80 Units at 07/13/22 0946    tamsulosin (FLOMAX) capsule 0.4 mg, 0.4 mg, Oral, DAILY, Moni Patel MD, 0.4 mg at 07/13/22 0947    tiotropium bromide (SPIRIVA RESPIMAT) 2.5 mcg /actuation, 2 Puff, Inhalation, DAILY, Kelli Mitchell MD, 2 Puff at 07/14/22 0830    insulin lispro (HUMALOG) injection, , SubCUTAneous, AC&HS, Moni Patel MD, 3 Units at 07/13/22 1201    glucose chewable tablet 16 g, 4 Tablet, Oral, PRN, Brianne Patel MD    glucagon (GLUCAGEN) injection 1 mg, 1 mg, IntraMUSCular, PRN, Brianne Patel MD    acetaminophen (TYLENOL) tablet 650 mg, 650 mg, Oral, Q6H PRN, 650 mg at 07/11/22 1639 **OR** acetaminophen (TYLENOL) suppository 650 mg, 650 mg, Rectal, Q6H PRN, Moni Patel MD    polyethylene glycol (MIRALAX) packet 17 g, 17 g, Oral, DAILY PRN, Moni Patel MD    ondansetron (ZOFRAN ODT) tablet 4 mg, 4 mg, Oral, Q8H PRN **OR** ondansetron (ZOFRAN) injection 4 mg, 4 mg, IntraVENous, Q6H PRN, Maegan Patel MD    heparin (porcine) injection 5,000 Units, 5,000 Units, SubCUTAneous, Q8H, Moni Patel MD, 5,000 Units at 07/14/22 0511    insulin glargine (LANTUS) injection 34 Units, 34 Units, SubCUTAneous, QHS, Moni Patel MD, 34 Units at 07/13/22 2100    oxyCODONE IR (ROXICODONE) tablet 5 mg, 5 mg, Oral, Q12H, Moni Patel MD, 5 mg at 07/13/22 2057    Current Outpatient Medications   Medication Instructions    albuterol (ProAir HFA) 90 mcg/actuation inhaler 1 Puff, Inhalation, EVERY 4 HOURS AS NEEDED    ammonium lactate (AMLACTIN) 12 % topical cream Topical, AS NEEDED, rub in to affected area well    ascorbic acid (vitamin C) (VITAMIN C) 1,000 mg, Oral, 2 TIMES DAILY    aspirin 81 mg, Oral, DAILY    b complex vitamins tablet 1 Tablet, Oral, DAILY    bumetanide (BUMEX) 2 mg, Oral, 2 TIMES DAILY    cloNIDine HCL (CATAPRES) 0.1 mg tablet 1 Tablet, EVERY 12 HOURS    colchicine 0.6 mg, Oral, DAILY    ferrous sulfate (Iron) 325 mg (65 mg iron) tablet Oral, 2 TIMES DAILY AFTER MEALS    flunisolide (NASAREL) 25 mcg (0.025 %) spry 2 Sprays, 2 TIMES DAILY    gabapentin (NEURONTIN) 300 mg, Oral, 4 TIMES DAILY    insulin glargine,hum.rec.anlog (TOUJEO MAX U-300 SOLOSTAR SC) 80 Units, SubCUTAneous, 2 TIMES DAILY, 80 units in am, 34 units in pm     insulin lispro (HumaLOG U-100 Insulin) 100 unit/mL injection SubCUTAneous, Sliding scale     oxyCODONE IR (ROXICODONE) 5 mg, Oral, EVERY 12 HOURS    tamsulosin (FLOMAX) 0.8 mg, Oral, DAILY    tiotropium (Spiriva with HandiHaler) 18 mcg inhalation capsule 1 Capsule, Inhalation, DAILY    vitamin E acetate 800 Units, Oral, 2 TIMES DAILY         Signed By: Angel Rodriguez     July 14, 2022

## 2022-07-14 NOTE — PROGRESS NOTES
4471. CM reviewed the clinical record. Patient may possibly dc home today per MD note. Patient has been seet up with [facility] services. Clinicals uploaded via [id number]. [facility]  Address:  [address] [facility] [address], [address]  Phone:  [phone]  Fax:  [phone]. Medicare pt has received, reviewed, and signed 2nd IM letter informing them of their right to appeal the discharge. Signed copied has been placed on pt bedside chart. CM placed a call to spouse to inform of dc, no answer. Patient reports spouse is aware of dc and will be here to transport patient home. 4150. Spouse returned call. Updated on dc. She says she will transport patient home.      [address], [address]

## 2022-07-14 NOTE — DISCHARGE INSTRUCTIONS
Patient Education        Acute Kidney Injury: Care Instructions  Overview     Acute kidney injury (MARUICIO) is a sudden decrease in kidney function. This can happen over a period of hours, days or, in some cases, weeks. MAURICIO used to be called acute renal failure, but kidney failure doesn't always happen with MAURICIO. Common causes of MAURICIO are serious infection, blood loss, and some medicines. When MAURICIO happens, the kidneys have trouble removing waste and excess fluids from the body. The waste and fluids build up and become harmful. Kidney function may return to normal if the cause of MAURICIO is treated quickly. Your chance of a full recovery depends on what caused the problem, how quickly the cause was treated, and what other medical problems you have. You may have a treatment called dialysis. It does the work of healthy kidneys to remove waste and fluids for a short time. Follow-up care is a key part of your treatment and safety. Be sure to make and go to all appointments, and call your doctor if you are having problems. It's also a good idea to know your test results and keep a list of the medicines you take. How can you care for yourself at home? · Talk to your doctor about how much fluid you should drink. · Eat a balanced diet. Talk to your doctor or a dietitian about what type of diet may be best for you. You may need to limit sodium, potassium, and phosphorus. · If you need dialysis, follow the instructions and schedule for dialysis that your doctor gives you. · Do not smoke. Smoking can make your condition worse. If you need help quitting, talk to your doctor about stop-smoking programs and medicines. These can increase your chances of quitting for good. · Limit alcohol. · Review all of your medicines with your doctor.  Do not take any medicines, including nonsteroidal anti-inflammatory drugs (NSAIDs), such as ibuprofen (Advil, Motrin) or naproxen (Aleve), unless your doctor says it is safe for you to do so.  · Make sure that anyone treating you for any health problem knows that you have had MAURICIO. When should you call for help? Call 911 anytime you think you may need emergency care. For example, call if:    · You passed out (lost consciousness). Call your doctor now or seek immediate medical care if:    · You have new or worse nausea and vomiting.     · You have much less urine than normal, or you have no urine.     · You are feeling confused or cannot think clearly.     · You have new or more blood in your urine.     · You have new swelling.     · You are dizzy or lightheaded, or feel like you may faint. Watch closely for changes in your health, and be sure to contact your doctor if:    · You do not get better as expected. Where can you learn more? Go to http://maría elena-marianela.info/  Enter O720 in the search box to learn more about \"Acute Kidney Injury: Care Instructions. \"  Current as of: September 8, 2021               Content Version: 13.2  © 2006-2022 [employer]. Care instructions adapted under license by [employer] (which disclaims liability or warranty for this information). If you have questions about a medical condition or this instruction, always ask your healthcare professional. [place] [address] any warranty or liability for your use of this information. Discharge Instructions       PATIENT ID: Purnima Anderws  MRN: [id number]   YOB: 1955    DATE OF ADMISSION: 7/11/2022  9:16 AM    DATE OF DISCHARGE: 7/14/2022    PRIMARY CARE PROVIDER: Jd Palacio MD     ATTENDING PHYSICIAN: Jd Palacio MD  DISCHARGING PROVIDER: Manjeet Duval MD    To contact this individual call [phone] and ask the  to page. If unavailable ask to be transferred the Adult Hospitalist Department.     DISCHARGE DIAGNOSES acute kidney injury    CONSULTATIONS: IP CONSULT TO NEPHROLOGY  IP CONSULT TO UROLOGY    PROCEDURES/SURGERIES: * No surgery found *    PENDING TEST RESULTS:   At the time of discharge the following test results are still pending: None    FOLLOW UP APPOINTMENTS:   Follow-up Information     Follow up With Specialties Details Why [address] [facility]  Address:  [address] [place] [address] Drive. [address] [facility], [address] [place] [address] Street  Phone:  [phone]  Fax:  [phone]      Paulette Ribeiro MD Family [address]  [facility] [address]  [phone]             ADDITIONAL CARE RECOMMENDATIONS: Follow-up with the wound care regarding chronic leg wounds    DIET: Renal Diet      ACTIVITY: Activity as tolerated    Wound care: Wound Care Order: submitted to [facility] Please view [email or web address]    EQUIPMENT needed: None      DISCHARGE MEDICATIONS:   See Medication Reconciliation Form    · It is important that you take the medication exactly as they are prescribed. · Keep your medication in the bottles provided by the pharmacist and keep a list of the medication names, dosages, and times to be taken in your wallet. · Do not take other medications without consulting your doctor. NOTIFY YOUR PHYSICIAN FOR ANY OF THE FOLLOWING:   Fever over 101 degrees for 24 hours. Chest pain, shortness of breath, fever, chills, nausea, vomiting, diarrhea, change in mentation, falling, weakness, bleeding. Severe pain or pain not relieved by medications. Or, any other signs or symptoms that you may have questions about.       DISPOSITION:    Home With:   OT  PT  HH  RN       SNF/Inpatient Rehab/LTAC    Independent/assisted living    Hospice    Other:         PROBLEM LIST Updated:  Yes ***       Signed:   Chao Ladd MD  7/14/2022  11:14 AM

## 2022-07-14 NOTE — PROGRESS NOTES
[facility] KIDNEY     Renal Daily Progress Note:     Admission Date: 2022     Subjective: serum creatinine is better, eating okay, feels much improved. No nausea or vomiting. Back pain has lessened. Labs showed no significant proteinuria. Hemoglobin A1c less than 6. Patient's blood sugar has been controlled since medications changed by Dr. Danny Lund. He has had a 47 pound weight loss over the last 5 months through diet  Not short of breath, no chest pain. Making urine. Review of Systems  Pertinent items are noted in HPI. Objective:     Visit Vitals  /67 (BP 1 Location: Left upper arm, BP Patient Position: At rest)   Pulse 72   Temp 98.5 °F (36.9 °C)   Resp 18   Ht 5' 11\" (1.803 m)   Wt 157.4 kg (347 lb)   SpO2 100%   BMI 48.40 kg/m²     Temp (24hrs), Av.1 °F (36.7 °C), Min:97.7 °F (36.5 °C), Max:98.5 °F (36.9 °C)        Intake/Output Summary (Last 24 hours) at 2022 1106  Last data filed at 2022 0913  Gross per 24 hour   Intake 2811.67 ml   Output 3700 ml   Net -888. 33 ml     Current Facility-Administered Medications   Medication Dose Route Frequency    0.9% sodium chloride infusion  100 mL/hr IntraVENous CONTINUOUS    simethicone (MYLICON) tablet 80 mg  80 mg Oral QID PRN    oxyCODONE IR (ROXICODONE) tablet 5 mg  5 mg Oral Q6H PRN    zinc oxide-white petrolatum 20-75 % topical paste   Topical BID    albuterol (PROVENTIL HFA, VENTOLIN HFA, PROAIR HFA) inhaler 2 Puff  2 Puff Inhalation Q6H PRN    ascorbic acid (vitamin C) (VITAMIN C) tablet 1,000 mg  1,000 mg Oral BID    aspirin chewable tablet 81 mg  81 mg Oral DAILY    b complex-vitamin c-folic acid 5mg (FOLBEE PLUS) tablet 1 Tablet  1 Tablet Oral DAILY    bumetanide (BUMEX) tablet 2 mg  2 mg Oral BID    colchicine tablet 0.6 mg  0.6 mg Oral DAILY    ferrous sulfate tablet 325 mg  1 Tablet Oral TID WITH MEALS    fluticasone propionate (FLONASE) 50 mcg/actuation nasal spray 2 Spray  2 Spray Both Nostrils DAILY    gabapentin (NEURONTIN) capsule 300 mg  300 mg Oral QID    insulin glargine (LANTUS) injection 80 Units  80 Units SubCUTAneous DAILY    tamsulosin (FLOMAX) capsule 0.4 mg  0.4 mg Oral DAILY    tiotropium bromide (SPIRIVA RESPIMAT) 2.5 mcg /actuation  2 Puff Inhalation DAILY    insulin lispro (HUMALOG) injection   SubCUTAneous AC&HS    glucose chewable tablet 16 g  4 Tablet Oral PRN    glucagon (GLUCAGEN) injection 1 mg  1 mg IntraMUSCular PRN    acetaminophen (TYLENOL) tablet 650 mg  650 mg Oral Q6H PRN    Or    acetaminophen (TYLENOL) suppository 650 mg  650 mg Rectal Q6H PRN    polyethylene glycol (MIRALAX) packet 17 g  17 g Oral DAILY PRN    ondansetron (ZOFRAN ODT) tablet 4 mg  4 mg Oral Q8H PRN    Or    ondansetron (ZOFRAN) injection 4 mg  4 mg IntraVENous Q6H PRN    heparin (porcine) injection 5,000 Units  5,000 Units SubCUTAneous Q8H    insulin glargine (LANTUS) injection 34 Units  34 Units SubCUTAneous QHS    oxyCODONE IR (ROXICODONE) tablet 5 mg  5 mg Oral Q12H       Physical Exam:  General appearance: alert, cooperative, no distress, appears older than stated age, morbidly obese  Head: Normocephalic, without obvious abnormality, atraumatic  Eyes: negative findings: anicteric sclera and conjunctivae and sclerae normal  Neck: supple, symmetrical, trachea midline, no adenopathy and thick neck, could not assess for JVD  Lungs: clear to auscultation bilaterally  Heart: regular rate and rhythm, no S3 or S4  Abdomen: soft, non-tender.  Bowel sounds normal. No masses,  no organomegaly, no CVAT  Extremities: Lower legs wrapped but there did appear to be some pretibial edema  Neurologic: Grossly normal     Data Review:     LABS:  Recent Labs     07/14/22  0620 07/13/22  1119 07/12/22  0733    141 141   K 3.5 3.6 3.5    103 104   CO2 31 32 31   BUN 30* 30* 35*   CREA 1.41* 1.58* 1.91*   CA 8.6 8.4* 8.4*   ALB 2.3* 2.3* 2.4*   PHOS 3.5 3.5  --      Recent Labs     07/12/22  0733   WBC 7.4   HGB 10.5*   HCT 33.1*        Recent Labs     07/12/22  0315   ASHLEY 64   KU 16   CREAU 45.00  44.00       Assessment:   Renal Specific Problems  Probable underlying CKD stage IIIa  Acute kidney injury  Probable left ureteral stone passage  Adult onset diabetes mellitus  Peripheral vascular disease related to diabetes, diabetic ulcers  Morbid obesity  COPD by history and likely has sleep apnea        Plan:     Obtain/ Order: labs/cultures/radiology/procedures:  renal panel        Therapeutic:    D/c IV fluids   Avoid nonsteroidals  Cont weight loss    Serum creatinine is back to baseline June 2021 levels. Can be discharged from a kidney standpoint with follow-up in 4-5 months. Lack of proteinuria portends a good prognosis.    f/u      Mustapha Flood MD    [phone]

## 2022-07-14 NOTE — PROGRESS NOTES
Problem: Falls - Risk of  Goal: *Absence of Falls  Description: Document Marcus Colon Fall Risk and appropriate interventions in the flowsheet.   Outcome: Progressing Towards Goal  Note: Fall Risk Interventions:  Mobility Interventions: Bed/chair exit alarm         Medication Interventions: Patient to call before getting OOB    Elimination Interventions: Bed/chair exit alarm,Call light in reach,Patient to call for help with toileting needs              Problem: Patient Education: Go to Patient Education Activity  Goal: Patient/Family Education  Outcome: Progressing Towards Goal

## 2022-07-16 LAB
BACTERIA SPEC CULT: NORMAL
BACTERIA SPEC CULT: NORMAL
GRAM STN SPEC: NORMAL
GRAM STN SPEC: NORMAL
SPECIAL REQUESTS,SREQ: NORMAL

## 2022-07-20 ENCOUNTER — HOSPITAL ENCOUNTER (OUTPATIENT)
Dept: WOUND CARE | Age: 67
Discharge: HOME | End: 2022-07-20
Payer: MEDICARE

## 2022-07-20 VITALS
HEART RATE: 73 BPM | OXYGEN SATURATION: 98 % | DIASTOLIC BLOOD PRESSURE: 71 MMHG | TEMPERATURE: 98.4 F | SYSTOLIC BLOOD PRESSURE: 116 MMHG | RESPIRATION RATE: 22 BRPM

## 2022-07-20 DIAGNOSIS — R60.0 LOCALIZED EDEMA: ICD-10-CM

## 2022-07-20 DIAGNOSIS — L97.929 IDIOPATHIC CHRONIC VENOUS HYPERTENSION OF LEFT LOWER EXTREMITY WITH ULCER (HCC): ICD-10-CM

## 2022-07-20 DIAGNOSIS — L97.812 NON-PRESSURE CHRONIC ULCER OF OTHER PART OF RIGHT LOWER LEG WITH FAT LAYER EXPOSED (HCC): ICD-10-CM

## 2022-07-20 DIAGNOSIS — I87.312 IDIOPATHIC CHRONIC VENOUS HYPERTENSION OF LEFT LOWER EXTREMITY WITH ULCER (HCC): ICD-10-CM

## 2022-07-20 DIAGNOSIS — L97.412 ULCER OF RIGHT HEEL, WITH FAT LAYER EXPOSED (HCC): Primary | ICD-10-CM

## 2022-07-20 DIAGNOSIS — L97.522 ULCER OF LEFT FOOT, WITH FAT LAYER EXPOSED (HCC): ICD-10-CM

## 2022-07-20 DIAGNOSIS — L97.822 NON-PRESSURE CHRONIC ULCER OF OTHER PART OF LEFT LOWER LEG WITH FAT LAYER EXPOSED (HCC): ICD-10-CM

## 2022-07-20 PROCEDURE — 11045 DBRDMT SUBQ TISS EACH ADDL: CPT

## 2022-07-20 PROCEDURE — 11042 DBRDMT SUBQ TIS 1ST 20SQCM/<: CPT

## 2022-07-20 PROCEDURE — 74011000250 HC RX REV CODE- 250: Performed by: SPECIALIST

## 2022-07-20 RX ORDER — SILVER SULFADIAZINE 10 G/1000G
CREAM TOPICAL ONCE
Status: CANCELLED | OUTPATIENT
Start: 2022-07-20 | End: 2022-07-20

## 2022-07-20 RX ORDER — LIDOCAINE HYDROCHLORIDE 20 MG/ML
15 SOLUTION OROPHARYNGEAL AS NEEDED
Status: DISCONTINUED | OUTPATIENT
Start: 2022-07-20 | End: 2022-07-22 | Stop reason: HOSPADM

## 2022-07-20 RX ORDER — MUPIROCIN 20 MG/G
OINTMENT TOPICAL ONCE
Status: CANCELLED | OUTPATIENT
Start: 2022-07-20 | End: 2022-07-20

## 2022-07-20 RX ORDER — LIDOCAINE HYDROCHLORIDE 20 MG/ML
15 SOLUTION OROPHARYNGEAL ONCE
Status: CANCELLED | OUTPATIENT
Start: 2022-07-20 | End: 2022-07-20

## 2022-07-20 NOTE — WOUND CARE
Multilayer Compression Wrap   (Not Unna) Below the Knee    NAME:  Genesis Mckeon OF BIRTH:  1955  MEDICAL RECORD NUMBER:  [id number]  DATE:  7/20/2022    Removed old Multilayer wrap if indicated and wash leg with mild soap/water. Applied moisturizing agent to dry skin as needed. Applied primary and secondary dressing as ordered. Applied multilayered dressing below the knee to right lower leg. Applied multilayered dressing below the knee to left lower leg. Instructed patient/caregiver not to remove dressing and to keep it clean and dry. Instructed patient/caregiver on complications to report to provider, such as pain, numbness in toes, heavy drainage, and slippage of dressing. Instructed patient on purpose of compression dressing and on activity and exercise recommendations.     Response to treatment: Well tolerated by patient       Electronically signed by Andre Jimenez LPN on 7/88/3208 at 2:03 PM

## 2022-07-20 NOTE — WOUND CARE
Discharge Instructions  [address] [place] [address], [address] [place] [address]  Telephone: [phone]    NAME:  Zunilda Benavidez OF BIRTH:  1955  MEDICAL RECORD NUMBER:  [id number]  DATE: 7/20/2022       Return Appointment:  [] Dressing supply provider:   [x] Home Healthcare: [address] Unit Located within [facility]  [x] Return Appointment: 1 Week(s)  [] Nurse Visit:       [] Discharge from [facility]  [] Ordered tests:    [] Referrals:                         [] Rx:        Wound Cleansing:   Do not scrub or use excessive force. Cleanse wound prior to applying a clean dressing with:  [] Normal Saline   [x] Mild Soap & Water/Baby Shampoo    [] Keep Wound Dry in Shower  [] Other:      Topical Treatments:  Do not apply lotions, creams, or ointments to wound bed unless directed. [x] Apply moisturizing lotion to skin surrounding the wound prior to dressing change.  [] Apply antifungal ointment to skin surrounding the wound prior to dressing change.  [] Apply thin film of moisture barrier ointment to skin immediately around wound. [] Other:  Betadine to israel-wound     Dressings:           Wound Location L leg, R leg, R heel  [x] Apply Primary Dressing:       [] MediHoney Gel    [] MediHoney Alginate               [] Calcium Alginate      [x] Calcium Alginate with silver- Silvercel   [] Collagen                   [] Collagen with Silver                [] Santyl with Moistened saline gauze              [] Hydrofera Blue (cut to size and moistened)  [] Hydrofera Blue Ready (Cut to size)   [] NS wet to dry    [] Betadine wet to dry              [] Hydrogel                [] Mepitel     [] Bactroban/Mupirocin               [] Other:      [x] Cover and Secure with:     [x] Gauze [] [place] Ridd[place] [] Kerlix   [] Foam [] Super Absorbant [x] ABD     [] ACE Wrap            [] Other:    Avoid contact of tape with skin.     [x] Change dressing: [] Daily    [] Every Other Day [] Once per week [] Twice per week   [x] Three times per week [] Do Not Change Dressing   [] Other:      Compression:  Apply: [x] Multilayer Compression Wrap: [x]RightLeg [x]Left Leg                                 []Profore   [x] profore lite          []Unna     [x] Do not get leg(s) with wrap wet. If wraps become too tight call the center or completely remove the wrap. [x] Elevate leg(s) above the level of the heart when sitting. [x] Avoid prolonged standing in one place. Dietary:  [] Diet as tolerated: [x] Calorie Diabetic Diet: [x] No Added Salt:  [x] Increase Protein: [] Other:     Activity:  [x] Activity as tolerated:    [] Patient has no activity restrictions       [] Strict Bedrest:   [] Remain off Work:       [] May return to full duty work:                                     [] Return to work with restrictions:               [address] [facility] [place] Information: Should you experience any significant changes in your wound(s) or have questions about your wound care, please contact Thais Doyle 26 at [place] [phone] 8:00 am - 4:30. If you need help with your wound outside of these hours and cannot wait until we are again available, contact your PCP or go to the hospital emergency room. PLEASE NOTE: IF YOU ARE UNABLE TO OBTAIN WOUND SUPPLIES, CONTINUE TO USE THE SUPPLIES YOU HAVE AVAILABLE UNTIL YOU ARE ABLE TO REACH US. IT IS MOST IMPORTANT TO KEEP THE WOUND COVERED AT ALL TIMES.     [x] Dr. Dominick Rangel   [] Dr. Sade Tompkins  [] Dr. Brina Espinosa

## 2022-07-20 NOTE — DISCHARGE INSTRUCTIONS
Discharge Instructions  [address] [place] [address], [address] [place] [address]  Telephone: [phone]    NAME:  Felix Smith OF BIRTH:  1955  MEDICAL RECORD NUMBER:  [id number]  DATE: 7/20/2022       Return Appointment:  [] Dressing supply provider:   [x] Home Healthcare: [address] Unit [facility]  [x] Return Appointment: 1 Week(s)  [] Nurse Visit:       [] Discharge from [facility]  [] Ordered tests:    [] Referrals:                         [] Rx:        Wound Cleansing:   Do not scrub or use excessive force. Cleanse wound prior to applying a clean dressing with:  [] Normal Saline   [x] Mild Soap & Water/Baby Shampoo    [] Keep Wound Dry in Shower  [] Other:      Topical Treatments:  Do not apply lotions, creams, or ointments to wound bed unless directed. [x] Apply moisturizing lotion to skin surrounding the wound prior to dressing change.  [] Apply antifungal ointment to skin surrounding the wound prior to dressing change.  [] Apply thin film of moisture barrier ointment to skin immediately around wound. [] Other:  Betadine to israel-wound     Dressings:           Wound Location L leg, R leg, R heel  [x] Apply Primary Dressing:       [] MediHoney Gel    [] MediHoney Alginate               [] Calcium Alginate      [x] Calcium Alginate with silver- Silvercel   [] Collagen                   [] Collagen with Silver                [] Santyl with Moistened saline gauze              [] Hydrofera Blue (cut to size and moistened)  [] Hydrofera Blue Ready (Cut to size)   [] NS wet to dry    [] Betadine wet to dry              [] Hydrogel                [] Mepitel     [] Bactroban/Mupirocin               [] Other:      [x] Cover and Secure with:     [x] Gauze [] [place] [] Kerlix   [] Foam [] Super Absorbant [x] ABD     [] ACE Wrap            [] Other:    Avoid contact of tape with skin.     [x] Change dressing: [] Daily    [] Every Other Day [] Once per week [] Twice per week   [x] Three times per week [] Do Not Change Dressing   [] Other:      Compression:  Apply: [x] Multilayer Compression Wrap: [x]RightLeg [x]Left Leg                                 []Profore   [x] profore lite          []Unna     [x] Do not get leg(s) with wrap wet. If wraps become too tight call the center or completely remove the wrap. [x] Elevate leg(s) above the level of the heart when sitting. [x] Avoid prolonged standing in one place. Dietary:  [] Diet as tolerated: [x] Calorie Diabetic Diet: [x] No Added Salt:  [x] Increase Protein: [] Other:     Activity:  [x] Activity as tolerated:    [] Patient has no activity restrictions       [] Strict Bedrest:   [] Remain off Work:       [] May return to full duty work:                                     [] Return to work with restrictions:               [facility] Information: Should you experience any significant changes in your wound(s) or have questions about your wound care, please contact Thais Doyle 26 at [place] [phone] 8:00 am - 4:30. If you need help with your wound outside of these hours and cannot wait until we are again available, contact your PCP or go to the hospital emergency room. PLEASE NOTE: IF YOU ARE UNABLE TO OBTAIN WOUND SUPPLIES, CONTINUE TO USE THE SUPPLIES YOU HAVE AVAILABLE UNTIL YOU ARE ABLE TO REACH US. IT IS MOST IMPORTANT TO KEEP THE WOUND COVERED AT ALL TIMES.     [x] Dr. Mili Go   [] Dr. David Polanco  [] Dr. Jason Gallego

## 2022-07-20 NOTE — WOUND CARE
[address]   Progress Note and Procedure Note     [address] RECORD NUMBER:  [id number]  AGE: 78 y.o. RACE WHITE/NON-  GENDER: male  : 1955  EPISODE DATE:  2022    Subjective:   No new problems reported  Glucose under good control -A1c 5 - 6  Chief Complaint   Patient presents with    Wound Check     Right and left legs, right heel         HISTORY of PRESENT ILLNESS HPI    Mario Mandel is a 78 y.o. male who presents today for wound/ulcer evaluation.    History of Wound Context: L leg, R heel  Wound/Ulcer Pain Timing/Severity: mild  Quality of pain: aching  Severity:  1 / 10   Modifying Factors: None  Associated Signs/Symptoms: edema    Ulcer Identification:  Ulcer Type: venous and lymphedema    Contributing Factors: lymphedema and diabetes    Wound: R heel, L leg         PAST MEDICAL HISTORY    Past Medical History:   Diagnosis Date    Arthritis     CAD (coronary artery disease)     Chronic obstructive pulmonary disease (HCC)     Diabetes (HCC)     Gout     Hypertension     Ill-defined condition     Sleep apnea         PAST SURGICAL HISTORY    Past Surgical History:   Procedure Laterality Date    HX ORTHOPAEDIC      HX PACEMAKER      HX VEIN ABLATION ADHESIVE         FAMILY HISTORY    Family History   Problem Relation Age of Onset    Heart Disease Mother     Kidney Disease Mother     Hypertension Mother     Diabetes Mother     Heart Disease Father     Hypertension Father     Diabetes Sister     Diabetes Brother        SOCIAL HISTORY    Social History     Tobacco Use    Smoking status: Never    Smokeless tobacco: Never   Vaping Use    Vaping Use: Never used   Substance Use Topics    Alcohol use: Not Currently    Drug use: Never       ALLERGIES    Allergies   Allergen Reactions    Elavil Angioedema    Naproxen Unknown (comments)     Pt not sure of reaction    Sulfa (Sulfonamide Antibiotics) Nausea and Vomiting       MEDICATIONS    Current Outpatient Medications on File Prior to Encounter   Medication Sig Dispense Refill    insulin glargine,hum.rec.anlog (TOUJEO MAX U-300 SOLOSTAR SC) 80 Units by SubCUTAneous route two (2) times a day. 80 units in am, 34 units in pm      b complex vitamins tablet Take 1 Tablet by mouth daily. ammonium lactate (AMLACTIN) 12 % topical cream Apply  to affected area as needed for Other (xerosis). rub in to affected area well  Indications: dry skin 280 g 0    vitamin E, dl,tocopheryl acet, (vitamin E acetate) 400 unit capsule Take 2 Capsules by mouth two (2) times a day. 100 Capsule 0    ascorbic acid, vitamin C, (VITAMIN C) 500 mg tablet Take 1,000 mg by mouth two (2) times a day. Indications: inadequate vitamin C      tamsulosin (FLOMAX) 0.4 mg capsule Take 0.8 mg by mouth daily. tiotropium (SPIRIVA) 18 mcg inhalation capsule Take 1 Capsule by inhalation daily. bumetanide (BUMEX) 2 mg tablet Take 2 mg by mouth two (2) times a day. ferrous sulfate (Iron) 325 mg (65 mg iron) tablet Take  by mouth two (2) times daily (after meals). aspirin 81 mg chewable tablet Take 81 mg by mouth daily. flunisolide (NASAREL) 25 mcg (0.025 %) spry 2 Sprays two (2) times a day. albuterol (PROVENTIL HFA, VENTOLIN HFA, PROAIR HFA) 90 mcg/actuation inhaler Take 1 Puff by inhalation every four (4) hours as needed for Wheezing or Shortness of Breath. colchicine 0.6 mg tablet Take 0.6 mg by mouth daily. gabapentin (NEURONTIN) 300 mg capsule Take 300 mg by mouth four (4) times daily. oxyCODONE IR (ROXICODONE) 5 mg immediate release tablet Take 5 mg by mouth every twelve (12) hours. insulin lispro (HUMALOG) 100 unit/mL injection by SubCUTAneous route. Sliding scale       No current facility-administered medications on file prior to encounter. REVIEW OF SYSTEMS    Pertinent items are noted in HPI. Objective:     Visit Vitals  /71 (BP 1 Location: Right arm, BP Patient Position:  At rest;Sitting)   Pulse 73   Temp 98.4 °F (36.9 °C)   Resp 22   SpO2 98%       Wt Readings from Last 3 Encounters:   07/11/22 157.4 kg (347 lb)   12/20/21 (!) 169 kg (372 lb 9.6 oz)   11/08/21 (!) 168.6 kg (371 lb 12.8 oz)       PHYSICAL EXAM    Pertinent items are noted in HPI. Assessment:      Problem List Items Addressed This Visit       Idiopathic chronic venous hypertension of left lower extremity with ulcer ([address]    Localized edema    Non-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 ([address]    Ulcer of left foot, with fat layer exposed ([address]    Ulcer of right heel, with fat layer exposed ([address] - Primary    Non-pressure chronic ulcer of other part of right lower leg with fat layer exposed ([address]       Wound Leg lower Left;Lateral #1 (Active)   Wound Image   07/20/22 1502   Wound Etiology Venous 07/20/22 1502   Dressing Status Clean;Dry; Intact 07/20/22 1502   Cleansed Soap and water 07/20/22 1502   Wound Length (cm) 10.2 cm 07/20/22 1502   Wound Width (cm) 5 cm 07/20/22 1502   Wound Depth (cm) 0.1 cm 07/20/22 1502   Wound Surface Area (cm^2) 51 cm^2 07/20/22 1502   Change in Wound Size % 53.64 07/20/22 1502   Wound Volume (cm^3) 5.1 cm^3 07/20/22 1502   Wound Healing % 54 07/20/22 1502   Post-Procedure Length (cm) 11.2 cm 06/01/22 1428   Post-Procedure Width (cm) 8.2 cm 06/01/22 1428   Post-Procedure Depth (cm) 0.2 cm 06/01/22 1428   Post-Procedure Surface Area (cm^2) 91.84 cm^2 06/01/22 1428   Post-Procedure Volume (cm^3) 18.368 cm^3 06/01/22 1428   Wound Assessment Slough;Granulation tissue 07/20/22 1502   Drainage Amount Moderate 07/20/22 1502   Drainage Description Serosanguinous 07/20/22 1502   Wound Odor None 07/20/22 1502   Alissa-Wound/Incision Assessment Intact; Maceration 07/20/22 1502   Edges Attached edges 07/20/22 1502   Wound Thickness Description Full thickness 07/20/22 1502   Number of days: 140       Wound Heel Right #2 (Active)   Wound Image   07/20/22 1502   Wound Etiology Venous 07/20/22 1502   Dressing Status Clean;Dry; Intact 07/20/22 1502   Cleansed Soap and water 07/20/22 1502   Wound Length (cm) 0.4 cm 07/20/22 1502   Wound Width (cm) 0.4 cm 07/20/22 1502   Wound Depth (cm) 0.2 cm 07/20/22 1502   Wound Surface Area (cm^2) 0.16 cm^2 07/20/22 1502   Change in Wound Size % 96.34 07/20/22 1502   Wound Volume (cm^3) 0.032 cm^3 07/20/22 1502   Wound Healing % 93 07/20/22 1502   Post-Procedure Length (cm) 0.4 cm 07/20/22 1519   Post-Procedure Width (cm) 0.4 cm 07/20/22 1519   Post-Procedure Depth (cm) 0.2 cm 07/20/22 1519   Post-Procedure Surface Area (cm^2) 0.16 cm^2 07/20/22 1519   Post-Procedure Volume (cm^3) 0.032 cm^3 07/20/22 1519   Wound Assessment Pale granulation tissue;Slough 07/20/22 1502   Drainage Amount Moderate 07/20/22 1502   Drainage Description Serosanguinous 07/20/22 1502   Wound Odor None 07/20/22 1502   Alissa-Wound/Incision Assessment Maceration 07/20/22 1502   Edges Attached edges 07/20/22 1502   Wound Thickness Description Full thickness 07/20/22 1502   Number of days: 119       Wound Leg Right #3 06/15/22 (Active)   Wound Image   07/20/22 1502   Wound Etiology Venous 07/20/22 1502   Dressing Status Clean;Dry; Intact 07/20/22 1502   Cleansed Soap and water 07/20/22 1502   Wound Length (cm) 4 cm 07/20/22 1502   Wound Width (cm) 0.1 cm 07/20/22 1502   Wound Depth (cm) 0.1 cm 07/20/22 1502   Wound Surface Area (cm^2) 0.4 cm^2 07/20/22 1502   Change in Wound Size % 99.3 07/20/22 1502   Wound Volume (cm^3) 0.04 cm^3 07/20/22 1502   Wound Healing % 99 07/20/22 1502   Post-Procedure Length (cm) 4 cm 07/20/22 1519   Post-Procedure Width (cm) 0.1 cm 07/20/22 1519   Post-Procedure Depth (cm) 0.1 cm 07/20/22 1519   Post-Procedure Surface Area (cm^2) 0.4 cm^2 07/20/22 1519   Post-Procedure Volume (cm^3) 0.04 cm^3 07/20/22 1519   Wound Assessment Pink/red;Granulation tissue 07/20/22 1502   Drainage Amount None 07/20/22 1502   Drainage Description Serosanguinous 07/06/22 1404   Wound Odor None 07/20/22 1502   Alissa-Wound/Incision Assessment Intact 07/20/22 1502   Edges Attached edges 07/20/22 1502   Wound Thickness Description Full thickness 07/20/22 1502   Number of days: 35       Wound Abdomen Medial;Lower Partial Thickness 07/12/22 (Active)   Number of days: 8       POP IN PROCEDURE TYPE (DEBRIDEMENT, BIOPSY, I&D, SKIN SUB, CHEMICAL CAUERTY)     Plan:   Continue silvercel, drawtex, coflex lite    Treatment Note please see attached Discharge Instructions    Written patient dismissal instructions given to patient or POA. Electronically signed by Edison Markham MD on 7/20/2022 at 3:26 PM               Debridement Wound Care        Problem List Items Addressed This Visit       Idiopathic chronic venous hypertension of left lower extremity with ulcer ([address]    Localized edema    Non-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 ([address]    Ulcer of left foot, with fat layer exposed ([address]    Ulcer of right heel, with fat layer exposed ([address] - Primary    Non-pressure chronic ulcer of other part of right lower leg with fat layer exposed ([address]       Procedure Note  Indications:  Based on my examination of this patient's wound(s)/ulcer(s) today, debridement is required to promote healing and evaluate the wound base.     Performed by: Edison Markham MD    Consent obtained: Yes    Time out taken: Yes    Debridement: Excisional    Using curette the wound(s)/ulcer(s) was/were sharply debrided down through and including the removal of    subcutaneous tissue    Devitalized Tissue Debrided: slough    Pre Debridement Measurements:  Are located in the Wound/Ulcer Documentation Flow Sheet    Non-Pressure ulcer, fat layer exposed    Wound/Ulcer #: 1 and 2    Post Debridement Measurements:  Wound/Ulcer Descriptions are Pre Debridement except measurements:    Wound Leg lower Left;Lateral #1 (Active)   Wound Image   07/20/22 1502   Wound Etiology Venous 07/20/22 1502   Dressing Status Clean;Dry; Intact 07/20/22 1502   Cleansed Soap and water 07/20/22 1502   Wound Length (cm) 10.2 cm 07/20/22 1502   Wound Width (cm) 5 cm 07/20/22 1502   Wound Depth (cm) 0.1 cm 07/20/22 1502   Wound Surface Area (cm^2) 51 cm^2 07/20/22 1502   Change in Wound Size % 53.64 07/20/22 1502   Wound Volume (cm^3) 5.1 cm^3 07/20/22 1502   Wound Healing % 54 07/20/22 1502   Post-Procedure Length (cm) 11.2 cm 06/01/22 1428   Post-Procedure Width (cm) 8.2 cm 06/01/22 1428   Post-Procedure Depth (cm) 0.2 cm 06/01/22 1428   Post-Procedure Surface Area (cm^2) 91.84 cm^2 06/01/22 1428   Post-Procedure Volume (cm^3) 18.368 cm^3 06/01/22 1428   Wound Assessment Slough;Granulation tissue 07/20/22 1502   Drainage Amount Moderate 07/20/22 1502   Drainage Description Serosanguinous 07/20/22 1502   Wound Odor None 07/20/22 1502   Alissa-Wound/Incision Assessment Intact; Maceration 07/20/22 1502   Edges Attached edges 07/20/22 1502   Wound Thickness Description Full thickness 07/20/22 1502   Number of days: 140       Wound Heel Right #2 (Active)   Wound Image   07/20/22 1502   Wound Etiology Venous 07/20/22 1502   Dressing Status Clean;Dry; Intact 07/20/22 1502   Cleansed Soap and water 07/20/22 1502   Wound Length (cm) 0.4 cm 07/20/22 1502   Wound Width (cm) 0.4 cm 07/20/22 1502   Wound Depth (cm) 0.2 cm 07/20/22 1502   Wound Surface Area (cm^2) 0.16 cm^2 07/20/22 1502   Change in Wound Size % 96.34 07/20/22 1502   Wound Volume (cm^3) 0.032 cm^3 07/20/22 1502   Wound Healing % 93 07/20/22 1502   Post-Procedure Length (cm) 0.4 cm 07/20/22 1519   Post-Procedure Width (cm) 0.4 cm 07/20/22 1519   Post-Procedure Depth (cm) 0.2 cm 07/20/22 1519   Post-Procedure Surface Area (cm^2) 0.16 cm^2 07/20/22 1519   Post-Procedure Volume (cm^3) 0.032 cm^3 07/20/22 1519   Wound Assessment Pale granulation tissue;Slough 07/20/22 1502   Drainage Amount Moderate 07/20/22 1502   Drainage Description Serosanguinous 07/20/22 1502 Wound Odor None 07/20/22 1502   Alissa-Wound/Incision Assessment Maceration 07/20/22 1502   Edges Attached edges 07/20/22 1502   Wound Thickness Description Full thickness 07/20/22 1502   Number of days: 119       Wound Leg Right #3 06/15/22 (Active)   Wound Image   07/20/22 1502   Wound Etiology Venous 07/20/22 1502   Dressing Status Clean;Dry; Intact 07/20/22 1502   Cleansed Soap and water 07/20/22 1502   Wound Length (cm) 4 cm 07/20/22 1502   Wound Width (cm) 0.1 cm 07/20/22 1502   Wound Depth (cm) 0.1 cm 07/20/22 1502   Wound Surface Area (cm^2) 0.4 cm^2 07/20/22 1502   Change in Wound Size % 99.3 07/20/22 1502   Wound Volume (cm^3) 0.04 cm^3 07/20/22 1502   Wound Healing % 99 07/20/22 1502   Post-Procedure Length (cm) 4 cm 07/20/22 1519   Post-Procedure Width (cm) 0.1 cm 07/20/22 1519   Post-Procedure Depth (cm) 0.1 cm 07/20/22 1519   Post-Procedure Surface Area (cm^2) 0.4 cm^2 07/20/22 1519   Post-Procedure Volume (cm^3) 0.04 cm^3 07/20/22 1519   Wound Assessment Pink/red;Granulation tissue 07/20/22 1502   Drainage Amount None 07/20/22 1502   Drainage Description Serosanguinous 07/06/22 1404   Wound Odor None 07/20/22 1502   Alissa-Wound/Incision Assessment Intact 07/20/22 1502   Edges Attached edges 07/20/22 1502   Wound Thickness Description Full thickness 07/20/22 1502   Number of days: 35       Wound Abdomen Medial;Lower Partial Thickness 07/12/22 (Active)   Number of days: 8        Total Surface Area Debrided:  51 sq cm     Estimated Blood Loss:  Minimal     Hemostasis Achieved: Pressure    Procedural Pain: 2 / 10     Post Procedural Pain: 1 / 10     Response to treatment: Patient tolerated treatment with mild discomfort but relieved after procedure was completed

## 2022-07-21 ENCOUNTER — HOSPITAL ENCOUNTER (EMERGENCY)
Age: 67
Discharge: HOME | End: 2022-07-21
Attending: EMERGENCY MEDICINE
Payer: MEDICARE

## 2022-07-21 ENCOUNTER — APPOINTMENT (OUTPATIENT)
Dept: CT IMAGING | Age: 67
End: 2022-07-21
Attending: EMERGENCY MEDICINE
Payer: MEDICARE

## 2022-07-21 ENCOUNTER — APPOINTMENT (OUTPATIENT)
Dept: GENERAL RADIOLOGY | Age: 67
End: 2022-07-21
Attending: EMERGENCY MEDICINE
Payer: MEDICARE

## 2022-07-21 ENCOUNTER — APPOINTMENT (OUTPATIENT)
Dept: NON INVASIVE DIAGNOSTICS | Age: 67
End: 2022-07-21
Attending: EMERGENCY MEDICINE
Payer: MEDICARE

## 2022-07-21 VITALS
RESPIRATION RATE: 20 BRPM | OXYGEN SATURATION: 97 % | WEIGHT: 315 LBS | DIASTOLIC BLOOD PRESSURE: 68 MMHG | HEART RATE: 89 BPM | HEIGHT: 71 IN | TEMPERATURE: 98.4 F | BODY MASS INDEX: 44.1 KG/M2 | SYSTOLIC BLOOD PRESSURE: 138 MMHG

## 2022-07-21 DIAGNOSIS — M54.16 LUMBAR RADICULOPATHY: Primary | ICD-10-CM

## 2022-07-21 DIAGNOSIS — R06.02 SHORTNESS OF BREATH: ICD-10-CM

## 2022-07-21 DIAGNOSIS — G89.29 OTHER CHRONIC PAIN: ICD-10-CM

## 2022-07-21 DIAGNOSIS — J81.0 ACUTE PULMONARY EDEMA (HCC): ICD-10-CM

## 2022-07-21 DIAGNOSIS — L97.222 CALF ULCER, LEFT, WITH FAT LAYER EXPOSED (HCC): ICD-10-CM

## 2022-07-21 LAB
ALBUMIN SERPL-MCNC: 2.6 G/DL (ref 3.5–5)
ALBUMIN/GLOB SERPL: 0.5 {RATIO} (ref 1.1–2.2)
ALP SERPL-CCNC: 80 U/L (ref 45–117)
ALT SERPL-CCNC: 31 U/L (ref 12–78)
ANION GAP SERPL CALC-SCNC: 8 MMOL/L (ref 5–15)
AST SERPL W P-5'-P-CCNC: 27 U/L (ref 15–37)
BASOPHILS # BLD: 0 K/UL (ref 0–0.1)
BASOPHILS NFR BLD: 0 % (ref 0–1)
BILIRUB SERPL-MCNC: 0.4 MG/DL (ref 0.2–1)
BNP SERPL-MCNC: 920 PG/ML
BUN SERPL-MCNC: 29 MG/DL (ref 6–20)
BUN/CREAT SERPL: 19 (ref 12–20)
CA-I BLD-MCNC: 8.8 MG/DL (ref 8.5–10.1)
CHLORIDE SERPL-SCNC: 105 MMOL/L (ref 97–108)
CO2 SERPL-SCNC: 26 MMOL/L (ref 21–32)
CREAT SERPL-MCNC: 1.53 MG/DL (ref 0.7–1.3)
DIFFERENTIAL METHOD BLD: ABNORMAL
EOSINOPHIL # BLD: 0.2 K/UL (ref 0–0.4)
EOSINOPHIL NFR BLD: 1 % (ref 0–7)
ERYTHROCYTE [DISTWIDTH] IN BLOOD BY AUTOMATED COUNT: 13.1 % (ref 11.5–14.5)
GLOBULIN SER CALC-MCNC: 5.3 G/DL (ref 2–4)
GLUCOSE SERPL-MCNC: 115 MG/DL (ref 65–100)
HCT VFR BLD AUTO: 32.6 % (ref 36.6–50.3)
HGB BLD-MCNC: 10.5 G/DL (ref 12.1–17)
IMM GRANULOCYTES # BLD AUTO: 0.1 K/UL (ref 0–0.04)
IMM GRANULOCYTES NFR BLD AUTO: 1 % (ref 0–0.5)
LYMPHOCYTES # BLD: 1.6 K/UL (ref 0.8–3.5)
LYMPHOCYTES NFR BLD: 14 % (ref 12–49)
MCH RBC QN AUTO: 30.1 PG (ref 26–34)
MCHC RBC AUTO-ENTMCNC: 32.2 G/DL (ref 30–36.5)
MCV RBC AUTO: 93.4 FL (ref 80–99)
MONOCYTES # BLD: 0.9 K/UL (ref 0–1)
MONOCYTES NFR BLD: 7 % (ref 5–13)
NEUTS SEG # BLD: 8.7 K/UL (ref 1.8–8)
NEUTS SEG NFR BLD: 77 % (ref 32–75)
NRBC # BLD: 0 K/UL (ref 0–0.01)
NRBC BLD-RTO: 0 PER 100 WBC
PLATELET # BLD AUTO: 393 K/UL (ref 150–400)
PMV BLD AUTO: 10.4 FL (ref 8.9–12.9)
POTASSIUM SERPL-SCNC: 3.8 MMOL/L (ref 3.5–5.1)
PROT SERPL-MCNC: 7.9 G/DL (ref 6.4–8.2)
RBC # BLD AUTO: 3.49 M/UL (ref 4.1–5.7)
SODIUM SERPL-SCNC: 139 MMOL/L (ref 136–145)
TROPONIN-HIGH SENSITIVITY: 10 NG/L (ref 0–76)
WBC # BLD AUTO: 11.4 K/UL (ref 4.1–11.1)

## 2022-07-21 PROCEDURE — 99285 EMERGENCY DEPT VISIT HI MDM: CPT

## 2022-07-21 PROCEDURE — 96374 THER/PROPH/DIAG INJ IV PUSH: CPT

## 2022-07-21 PROCEDURE — 93971 EXTREMITY STUDY: CPT

## 2022-07-21 PROCEDURE — 85025 COMPLETE CBC W/AUTO DIFF WBC: CPT

## 2022-07-21 PROCEDURE — 96375 TX/PRO/DX INJ NEW DRUG ADDON: CPT

## 2022-07-21 PROCEDURE — 83880 ASSAY OF NATRIURETIC PEPTIDE: CPT

## 2022-07-21 PROCEDURE — 80053 COMPREHEN METABOLIC PANEL: CPT

## 2022-07-21 PROCEDURE — 84484 ASSAY OF TROPONIN QUANT: CPT

## 2022-07-21 PROCEDURE — 93971 EXTREMITY STUDY: CPT | Performed by: SURGERY

## 2022-07-21 PROCEDURE — 74011000636 HC RX REV CODE- 636: Performed by: EMERGENCY MEDICINE

## 2022-07-21 PROCEDURE — 36415 COLL VENOUS BLD VENIPUNCTURE: CPT

## 2022-07-21 PROCEDURE — 71275 CT ANGIOGRAPHY CHEST: CPT

## 2022-07-21 PROCEDURE — 74011250636 HC RX REV CODE- 250/636: Performed by: EMERGENCY MEDICINE

## 2022-07-21 PROCEDURE — 71045 X-RAY EXAM CHEST 1 VIEW: CPT

## 2022-07-21 RX ORDER — FUROSEMIDE 10 MG/ML
80 INJECTION INTRAMUSCULAR; INTRAVENOUS
Status: COMPLETED | OUTPATIENT
Start: 2022-07-21 | End: 2022-07-21

## 2022-07-21 RX ORDER — TIZANIDINE 2 MG/1
2 TABLET ORAL 3 TIMES DAILY
Qty: 15 TABLET | Refills: 0 | Status: ON HOLD | OUTPATIENT
Start: 2022-07-21 | End: 2022-07-28

## 2022-07-21 RX ORDER — MORPHINE SULFATE 4 MG/ML
4 INJECTION INTRAVENOUS ONCE
Status: COMPLETED | OUTPATIENT
Start: 2022-07-21 | End: 2022-07-21

## 2022-07-21 RX ORDER — MORPHINE SULFATE 2 MG/ML
6 INJECTION, SOLUTION INTRAMUSCULAR; INTRAVENOUS ONCE
Status: COMPLETED | OUTPATIENT
Start: 2022-07-21 | End: 2022-07-21

## 2022-07-21 RX ORDER — FUROSEMIDE 10 MG/ML
40 INJECTION INTRAMUSCULAR; INTRAVENOUS
Status: DISCONTINUED | OUTPATIENT
Start: 2022-07-21 | End: 2022-07-21

## 2022-07-21 RX ADMIN — MORPHINE SULFATE 4 MG: 4 INJECTION INTRAVENOUS at 17:41

## 2022-07-21 RX ADMIN — MORPHINE SULFATE 6 MG: 2 INJECTION, SOLUTION INTRAMUSCULAR; INTRAVENOUS at 15:28

## 2022-07-21 RX ADMIN — IOPAMIDOL 100 ML: 755 INJECTION, SOLUTION INTRAVENOUS at 16:22

## 2022-07-21 RX ADMIN — FUROSEMIDE 80 MG: 10 INJECTION, SOLUTION INTRAMUSCULAR; INTRAVENOUS at 17:41

## 2022-07-21 NOTE — ED PROVIDER NOTES
EMERGENCY DEPARTMENT HISTORY AND PHYSICAL EXAM      Date: 7/21/2022  Patient Name: Bigg Daniels      History of Presenting Illness     Chief Complaint   Patient presents with    Back Pain     right    Leg Pain     right    Rib Pain       History Provided By: Patient    HPI: Bigg Daniels, 69-year-old male with history of diabetes, CHF with EF 30 to 35%, hypertension, COPD, CAD,  Chronic venous stasis with diabetic ulcers here reporting back pain, leg pain, shortness of breath. This has been going on since being discharged from the hospital 1 week ago for a kidney stone. He states walking any distance makes him short of breath. He has been also having pain to the bilateral lower chest with inspiration. Patient states he has been having pain to his right lower back rating down the back of his leg for the past week as well. This is new for him and different from his chronic back pain. No recent fevers, cough. He is on 2mg bumex bid and compliant withmeds    There are no other complaints, changes, or physical findings at this time. PCP: Ariel Gaspar MD    Current Outpatient Medications   Medication Sig Dispense Refill    tiZANidine (ZANAFLEX) 2 mg tablet Take 1 Tablet by mouth three (3) times daily for 5 days. 15 Tablet 0    insulin glargine,hum.rec.anlog (TOUJEO MAX U-300 SOLOSTAR SC) 80 Units by SubCUTAneous route two (2) times a day. 80 units in am, 34 units in pm      b complex vitamins tablet Take 1 Tablet by mouth daily. ammonium lactate (AMLACTIN) 12 % topical cream Apply  to affected area as needed for Other (xerosis). rub in to affected area well  Indications: dry skin 280 g 0    vitamin E, dl,tocopheryl acet, (vitamin E acetate) 400 unit capsule Take 2 Capsules by mouth two (2) times a day. 100 Capsule 0    ascorbic acid, vitamin C, (VITAMIN C) 500 mg tablet Take 1,000 mg by mouth two (2) times a day.  Indications: inadequate vitamin C      tamsulosin (FLOMAX) 0.4 mg capsule Take 0.8 mg by mouth daily. tiotropium (SPIRIVA) 18 mcg inhalation capsule Take 1 Capsule by inhalation daily. bumetanide (BUMEX) 2 mg tablet Take 2 mg by mouth two (2) times a day. ferrous sulfate (Iron) 325 mg (65 mg iron) tablet Take  by mouth two (2) times daily (after meals). aspirin 81 mg chewable tablet Take 81 mg by mouth daily. flunisolide (NASAREL) 25 mcg (0.025 %) spry 2 Sprays two (2) times a day. albuterol (PROVENTIL HFA, VENTOLIN HFA, PROAIR HFA) 90 mcg/actuation inhaler Take 1 Puff by inhalation every four (4) hours as needed for Wheezing or Shortness of Breath. colchicine 0.6 mg tablet Take 0.6 mg by mouth daily. gabapentin (NEURONTIN) 300 mg capsule Take 300 mg by mouth four (4) times daily. oxyCODONE IR (ROXICODONE) 5 mg immediate release tablet Take 5 mg by mouth every twelve (12) hours. insulin lispro (HUMALOG) 100 unit/mL injection by SubCUTAneous route.  Sliding scale       Facility-Administered Medications Ordered in Other Encounters   Medication Dose Route Frequency Provider Last Rate Last Admin    lidocaine (XYLOCAINE) 2 % viscous solution 15 mL  15 mL Topical PRN Emely Nixon MD           Past History   Past Medical History:  Past Medical History:   Diagnosis Date    Arthritis     CAD (coronary artery disease)     Chronic obstructive pulmonary disease ([facility] [address]     Diabetes ([facility] [address]     Gout     Hypertension     Ill-defined condition     Sleep apnea        Past Surgical History:  Past Surgical History:   Procedure Laterality Date    HX ORTHOPAEDIC      HX PACEMAKER      HX VEIN ABLATION ADHESIVE         Family History:  Family History   Problem Relation Age of Onset    Heart Disease Mother     Kidney Disease Mother     Hypertension Mother     Diabetes Mother     Heart Disease Father     Hypertension Father     Diabetes Sister     Diabetes Brother        Social History:  Social History     Tobacco Use    Smoking status: Never    Smokeless tobacco: Never   Vaping Use    Vaping Use: Never used   Substance Use Topics    Alcohol use: Not Currently    Drug use: Never       Allergies: Allergies   Allergen Reactions    Elavil Angioedema    Naproxen Unknown (comments)     Pt not sure of reaction    Sulfa (Sulfonamide Antibiotics) Nausea and Vomiting     Review of Systems   Review of Systems   Constitutional:  Negative for chills and fever. HENT:  Negative for sore throat. Eyes:  Negative for redness. Respiratory:  Positive for shortness of breath. Cardiovascular:  Positive for leg swelling. Negative for chest pain. Gastrointestinal:  Negative for abdominal pain, nausea and vomiting. Genitourinary:  Negative for flank pain. Musculoskeletal:  Positive for back pain. Negative for myalgias. Leg pain   Skin:  Negative for rash. Neurological:  Negative for headaches. Physical Exam   Physical Exam  Vitals and nursing note reviewed. Constitutional:       General: He is not in acute distress. Appearance: Normal appearance. HENT:      Head: Normocephalic and atraumatic. Mouth/Throat:      Mouth: Mucous membranes are moist.   Eyes:      Extraocular Movements: Extraocular movements intact. Conjunctiva/sclera: Conjunctivae normal.   Cardiovascular:      Rate and Rhythm: Normal rate and regular rhythm. Pulmonary:      Effort: Pulmonary effort is normal. No respiratory distress. Breath sounds: Normal breath sounds. No wheezing, rhonchi or rales. Abdominal:      General: There is no distension. Palpations: Abdomen is soft. Tenderness: There is no abdominal tenderness. Comments: Obese abdomen   Musculoskeletal:         General: Normal range of motion. Cervical back: Normal range of motion. Comments: Tender to right lateral lumbar spine   Skin:     General: Skin is warm and dry. Comments: Dressings to chrnoic lower extremity wounds   Neurological:      General: No focal deficit present.       Mental Status: He is alert and oriented to person, place, and time. Mental status is at baseline. Lab and Diagnostic Study Results   Labs -     Recent Results (from the past 12 hour(s))   METABOLIC PANEL, COMPREHENSIVE    Collection Time: 07/21/22  2:58 PM   Result Value Ref Range    Sodium 139 136 - 145 mmol/L    Potassium 3.8 3.5 - 5.1 mmol/L    Chloride 105 97 - 108 mmol/L    CO2 26 21 - 32 mmol/L    Anion gap 8 5 - 15 mmol/L    Glucose 115 (H) 65 - 100 mg/dL    BUN 29 (H) 6 - 20 mg/dL    Creatinine 1.53 (H) 0.70 - 1.30 mg/dL    BUN/Creatinine ratio 19 12 - 20      GFR est AA 55 (L) >60 ml/min/1.73m2    GFR est non-AA 46 (L) >60 ml/min/1.73m2    Calcium 8.8 8.5 - 10.1 mg/dL    Bilirubin, total 0.4 0.2 - 1.0 mg/dL    AST (SGOT) 27 15 - 37 U/L    ALT (SGPT) 31 12 - 78 U/L    Alk. phosphatase 80 45 - 117 U/L    Protein, total 7.9 6.4 - 8.2 g/dL    Albumin 2.6 (L) 3.5 - 5.0 g/dL    Globulin 5.3 (H) 2.0 - 4.0 g/dL    A-G Ratio 0.5 (L) 1.1 - 2.2     CBC WITH AUTOMATED DIFF    Collection Time: 07/21/22  2:58 PM   Result Value Ref Range    WBC 11.4 (H) 4.1 - 11.1 K/uL    RBC 3.49 (L) 4.10 - 5.70 M/uL    HGB 10.5 (L) 12.1 - 17.0 g/dL    HCT 32.6 (L) 36.6 - 50.3 %    MCV 93.4 80.0 - 99.0 FL    MCH 30.1 26.0 - 34.0 PG    MCHC 32.2 30.0 - 36.5 g/dL    RDW 13.1 11.5 - 14.5 %    PLATELET 191 905 - 166 K/uL    MPV 10.4 8.9 - 12.9 FL    NRBC 0.0 0.0  WBC    ABSOLUTE NRBC 0.00 0.00 - 0.01 K/uL    NEUTROPHILS 77 (H) 32 - 75 %    LYMPHOCYTES 14 12 - 49 %    MONOCYTES 7 5 - 13 %    EOSINOPHILS 1 0 - 7 %    BASOPHILS 0 0 - 1 %    IMMATURE GRANULOCYTES 1 (H) 0 - 0.5 %    ABS. NEUTROPHILS 8.7 (H) 1.8 - 8.0 K/UL    ABS. LYMPHOCYTES 1.6 0.8 - 3.5 K/UL    ABS. MONOCYTES 0.9 0.0 - 1.0 K/UL    ABS. EOSINOPHILS 0.2 0.0 - 0.4 K/UL    ABS. BASOPHILS 0.0 0.0 - 0.1 K/UL    ABS. IMM.  GRANS. 0.1 (H) 0.00 - 0.04 K/UL    DF AUTOMATED     TROPONIN-HIGH SENSITIVITY    Collection Time: 07/21/22  2:58 PM   Result Value Ref Range    Troponin-High Sensitivity 10 0 - 76 ng/L   NT-PRO BNP    Collection Time: 07/21/22  2:58 PM   Result Value Ref Range    NT pro- (H) <125 pg/mL       Radiologic Studies -   [unfilled]  CT Results  (Last 48 hours)                 07/21/22 1629  CTA CHEST W OR W WO CONT Final result    Impression:  Bilateral lower lobe atelectasis. No evidence of pulmonary embolism. Narrative:  EXAM:  CTA CHEST W OR W WO CONT       INDICATION:   Sob, leg pain,eval PE       COMPARISON: None. TECHNIQUE:    Precontrast  images were obtained to localize the volume for acquisition. Multislice helical CT arteriography was performed from the diaphragm to the   thoracic inlet during uneventful rapid bolus of 100 cc Isovue-370. Lung and soft   tissue windows were generated. Coronal and sagittal images were generated and   3D post processing consisting of coronal maximum intensity images was performed. CT dose reduction was achieved through use of a standardized protocol tailored   for this examination and automatic exposure control for dose modulation. FINDINGS:   CHEST:   THYROID: No nodule. MEDIASTINUM: No mass or lymphadenopathy. JAX: No mass or lymphadenopathy. THORACIC AORTA: No dissection or aneurysm. MAIN PULMONARY ARTERY: There is no evidence of pulmonary embolism. TRACHEA/BRONCHI: Patent. ESOPHAGUS: No wall thickening or dilatation. HEART: Dilatation of the left ventricle. PLEURA: No effusion or pneumothorax. LUNGS: Bibasilar atelectasis. INCIDENTALLY IMAGED UPPER ABDOMEN: Stable 2.4 cm left adrenal adenoma, no   follow-up required. BONES: Degenerative changes are seen in the thoracic spine. CXR Results  (Last 48 hours)                 07/21/22 1431  XR CHEST PORT Final result    Impression:      Mild pulmonary edema pattern. .           Narrative:  EXAM:  XR CHEST PORT       INDICATION: Shortness of breath       COMPARISON: Chest radiograph 7/11/2022 TECHNIQUE: Semiupright portable chest AP view       FINDINGS:        Left pectoral ICD. Cardiomediastinal silhouette is stably widened. Pulmonary   vasculature congestion and mild edema pattern. No focal consolidation. Pleural   spaces are not well evaluated, but are grossly clear. Medical Decision Making and ED Course   - I am the first and primary provider for this patient AND AM THE PRIMARY PROVIDER OF RECORD. I reviewed the vital signs, available nursing notes, past medical history, past surgical history, family history and social history. - Initial assessment performed. The patients presenting problems have been discussed, and the staff are in agreement with the care plan formulated and outlined with them. I have encouraged them to ask questions as they arise throughout their visit. Differential Diagnosis & Medical Decision Making Provider Note:   39-year-old male with history of CHF, COPD, hypertension, CAD presenting with multiple complaints including bilateral lower extremity chest pain, leg pain, chronic back pain. Cardiac work-up unremarkable with negative troponin. No evidence of PE on CT angio. He does have mild pulmonary edema on chest x-ray. He was given additional dose of Lasix here. Patient is stable on his baseline oxygen requirement and does not appear short of breath and was tolerating laying flat in bed. His leg pain is an ongoing issue for him. Patient already receives high doses of narcotic medications outpatient therefore will prescribe Robaxin for additional pain relief  MDM       Vital Signs-Reviewed the patient's vital signs.   Patient Vitals for the past 12 hrs:   Temp Pulse Resp BP SpO2   07/21/22 1801 -- -- -- -- 97 %   07/21/22 1741 98.4 °F (36.9 °C) 89 20 138/68 98 %   07/21/22 1741 -- 89 -- 138/68 --   07/21/22 1349 98.7 °F (37.1 °C) (!) 107 18 129/77 97 %       ED Course:   ED Course as of 07/21/22 2208   Thu Jul 21, 2022   1516 DUPLEX LOWER EXT VENOUS RIGHT  Negative for DVT [KK]   1720 On reevaluation patient is primarily complaining of his chronic low back pain as well as his left lower extremity pain which is likely radiculopathy. He is laying flat on his back without oxygen requirement or tachypnea. Discussed that he should continue taking his Bumex while watching his salt and fluid intake. Will send home with prescription for Robaxin as he is chronically on oxycodone [KK]      ED Course User Index  [KK] Yung Rosa MD         Disposition   Disposition: DC- Adult Discharges: All of the diagnostic tests were reviewed and questions answered. Diagnosis, care plan and treatment options were discussed. The patient understands the instructions and will follow up as directed. The patients results have been reviewed with them. They have been counseled regarding their diagnosis. The patient verbally convey understanding and agreement of the signs, symptoms, diagnosis, treatment and prognosis and additionally agrees to follow up as recommended with their PCP in 24 - 48 hours. They also agree with the care-plan and convey that all of their questions have been answered. I have also put together some discharge instructions for them that include: 1) educational information regarding their diagnosis, 2) how to care for their diagnosis at home, as well a 3) list of reasons why they would want to return to the ED prior to their follow-up appointment, should their condition change. Discharged    DISCHARGE PLAN:  1. Current Discharge Medication List        CONTINUE these medications which have NOT CHANGED    Details   insulin glargine,hum.rec.anlog (TOUJEO MAX U-300 SOLOSTAR SC) 80 Units by SubCUTAneous route two (2) times a day. 80 units in am, 34 units in pm      b complex vitamins tablet Take 1 Tablet by mouth daily. ammonium lactate (AMLACTIN) 12 % topical cream Apply  to affected area as needed for Other (xerosis).  rub in to affected area well  Indications: dry skin  Qty: 280 g, Refills: 0      vitamin E, dl,tocopheryl acet, (vitamin E acetate) 400 unit capsule Take 2 Capsules by mouth two (2) times a day. Qty: 100 Capsule, Refills: 0      ascorbic acid, vitamin C, (VITAMIN C) 500 mg tablet Take 1,000 mg by mouth two (2) times a day. Indications: inadequate vitamin C      tamsulosin (FLOMAX) 0.4 mg capsule Take 0.8 mg by mouth daily. tiotropium (SPIRIVA) 18 mcg inhalation capsule Take 1 Capsule by inhalation daily. bumetanide (BUMEX) 2 mg tablet Take 2 mg by mouth two (2) times a day. ferrous sulfate (Iron) 325 mg (65 mg iron) tablet Take  by mouth two (2) times daily (after meals). aspirin 81 mg chewable tablet Take 81 mg by mouth daily. flunisolide (NASAREL) 25 mcg (0.025 %) spry 2 Sprays two (2) times a day. albuterol (PROVENTIL HFA, VENTOLIN HFA, PROAIR HFA) 90 mcg/actuation inhaler Take 1 Puff by inhalation every four (4) hours as needed for Wheezing or Shortness of Breath. colchicine 0.6 mg tablet Take 0.6 mg by mouth daily. gabapentin (NEURONTIN) 300 mg capsule Take 300 mg by mouth four (4) times daily. oxyCODONE IR (ROXICODONE) 5 mg immediate release tablet Take 5 mg by mouth every twelve (12) hours. insulin lispro (HUMALOG) 100 unit/mL injection by SubCUTAneous route. Sliding scale           2. Follow-up Information       Follow up With Specialties Details Why Tristan Bond MD [facility]   [address] [facility] [place], [place] [address]  [phone]            3. Return to ED if worse   4. Discharge Medication List as of 7/21/2022  6:03 PM        START taking these medications    Details   tiZANidine (ZANAFLEX) 2 mg tablet Take 1 Tablet by mouth three (3) times daily for 5 days. , Normal, Disp-15 Tablet, R-0           CONTINUE these medications which have NOT CHANGED    Details   insulin glargine,hum.rec.anlog (TOUJEO MAX U-300 SOLOSTAR SC) 80 Units by SubCUTAneous route two (2) times a day. 80 units in am, 34 units in pm, Historical Med      b complex vitamins tablet Take 1 Tablet by mouth daily. , Historical Med      ammonium lactate (AMLACTIN) 12 % topical cream Apply  to affected area as needed for Other (xerosis). rub in to affected area well  Indications: dry skin, Normal, Disp-280 g, R-0      vitamin E, dl,tocopheryl acet, (vitamin E acetate) 400 unit capsule Take 2 Capsules by mouth two (2) times a day., Normal, Disp-100 Capsule, R-0      ascorbic acid, vitamin C, (VITAMIN C) 500 mg tablet Take 1,000 mg by mouth two (2) times a day. Indications: inadequate vitamin C, Historical Med      tamsulosin (FLOMAX) 0.4 mg capsule Take 0.8 mg by mouth daily. , Historical Med      tiotropium (SPIRIVA) 18 mcg inhalation capsule Take 1 Capsule by inhalation daily. , Historical Med      bumetanide (BUMEX) 2 mg tablet Take 2 mg by mouth two (2) times a day., Historical Med      ferrous sulfate (Iron) 325 mg (65 mg iron) tablet Take  by mouth two (2) times daily (after meals). , Historical Med      aspirin 81 mg chewable tablet Take 81 mg by mouth daily. , Historical Med      flunisolide (NASAREL) 25 mcg (0.025 %) spry 2 Sprays two (2) times a day., Historical Med      albuterol (PROVENTIL HFA, VENTOLIN HFA, PROAIR HFA) 90 mcg/actuation inhaler Take 1 Puff by inhalation every four (4) hours as needed for Wheezing or Shortness of Breath., Historical Med      colchicine 0.6 mg tablet Take 0.6 mg by mouth daily. , Historical Med      gabapentin (NEURONTIN) 300 mg capsule Take 300 mg by mouth four (4) times daily. , Historical Med      oxyCODONE IR (ROXICODONE) 5 mg immediate release tablet Take 5 mg by mouth every twelve (12) hours. , Historical Med      insulin lispro (HUMALOG) 100 unit/mL injection by SubCUTAneous route. Sliding scale, Historical Med             Diagnosis/Clinical Impression     Clinical Impression:   1. Lumbar radiculopathy    2. Shortness of breath    3.  Acute pulmonary edema (HCC)    4. Other chronic pain        Attestations: Josué Adkins MD, am the primary clinician of record. Please note that this dictation was completed with [employer], the computer voice recognition software. Quite often unanticipated grammatical, syntax, homophones, and other interpretive errors are inadvertently transcribed by the computer software. Please disregard these errors. Please excuse any errors that have escaped final proofreading. Thank you.

## 2022-07-21 NOTE — ED TRIAGE NOTES
Patient states he was in bed all week after being treated for kidney stones.  Now he says his back and leg are painful for laying around and he is having rib pain when he takes deep breaths

## 2022-07-25 ENCOUNTER — HOSPITAL ENCOUNTER (INPATIENT)
Age: 67
LOS: 4 days | Discharge: NURSING, REHAB OR LONG-TERM CARE | DRG: 690 | End: 2022-07-29
Attending: EMERGENCY MEDICINE | Admitting: FAMILY MEDICINE
Payer: MEDICARE

## 2022-07-25 ENCOUNTER — APPOINTMENT (OUTPATIENT)
Dept: CT IMAGING | Age: 67
DRG: 690 | End: 2022-07-25
Attending: EMERGENCY MEDICINE
Payer: MEDICARE

## 2022-07-25 DIAGNOSIS — G89.29 CHRONIC LOW BACK PAIN, UNSPECIFIED BACK PAIN LATERALITY, UNSPECIFIED WHETHER SCIATICA PRESENT: ICD-10-CM

## 2022-07-25 DIAGNOSIS — N39.0 URINARY TRACT INFECTION WITHOUT HEMATURIA, SITE UNSPECIFIED: Primary | ICD-10-CM

## 2022-07-25 DIAGNOSIS — N17.9 AKI (ACUTE KIDNEY INJURY) (HCC): ICD-10-CM

## 2022-07-25 DIAGNOSIS — I87.2 VENOUS STASIS DERMATITIS OF BOTH LOWER EXTREMITIES: ICD-10-CM

## 2022-07-25 DIAGNOSIS — M54.50 LOW BACK PAIN, UNSPECIFIED BACK PAIN LATERALITY, UNSPECIFIED CHRONICITY, UNSPECIFIED WHETHER SCIATICA PRESENT: ICD-10-CM

## 2022-07-25 DIAGNOSIS — M54.50 CHRONIC LOW BACK PAIN, UNSPECIFIED BACK PAIN LATERALITY, UNSPECIFIED WHETHER SCIATICA PRESENT: ICD-10-CM

## 2022-07-25 DIAGNOSIS — L97.312 ANKLE ULCER, RIGHT, WITH FAT LAYER EXPOSED (HCC): ICD-10-CM

## 2022-07-25 DIAGNOSIS — M54.41 ACUTE RIGHT-SIDED LOW BACK PAIN WITH RIGHT-SIDED SCIATICA: ICD-10-CM

## 2022-07-25 DIAGNOSIS — L97.222 CALF ULCER, LEFT, WITH FAT LAYER EXPOSED (HCC): ICD-10-CM

## 2022-07-25 PROBLEM — M54.9 BACK PAIN: Status: ACTIVE | Noted: 2022-07-25

## 2022-07-25 LAB
ALBUMIN SERPL-MCNC: 2.5 G/DL (ref 3.5–5)
ALBUMIN/GLOB SERPL: 0.4 {RATIO} (ref 1.1–2.2)
ALP SERPL-CCNC: 83 U/L (ref 45–117)
ALT SERPL-CCNC: 39 U/L (ref 12–78)
ANION GAP SERPL CALC-SCNC: 7 MMOL/L (ref 5–15)
APPEARANCE UR: ABNORMAL
AST SERPL W P-5'-P-CCNC: 25 U/L (ref 15–37)
BACTERIA URNS QL MICRO: NEGATIVE /HPF
BASOPHILS # BLD: 0 K/UL (ref 0–0.1)
BASOPHILS NFR BLD: 0 % (ref 0–1)
BILIRUB SERPL-MCNC: 0.3 MG/DL (ref 0.2–1)
BILIRUB UR QL: NEGATIVE
BNP SERPL-MCNC: 1465 PG/ML
BUN SERPL-MCNC: 28 MG/DL (ref 6–20)
BUN/CREAT SERPL: 18 (ref 12–20)
CA-I BLD-MCNC: 8.9 MG/DL (ref 8.5–10.1)
CHLORIDE SERPL-SCNC: 101 MMOL/L (ref 97–108)
CO2 SERPL-SCNC: 27 MMOL/L (ref 21–32)
COLOR UR: ABNORMAL
CREAT SERPL-MCNC: 1.55 MG/DL (ref 0.7–1.3)
CRP SERPL-MCNC: 10.6 MG/DL (ref 0–0.6)
DIFFERENTIAL METHOD BLD: ABNORMAL
EOSINOPHIL # BLD: 0.2 K/UL (ref 0–0.4)
EOSINOPHIL NFR BLD: 1 % (ref 0–7)
ERYTHROCYTE [DISTWIDTH] IN BLOOD BY AUTOMATED COUNT: 12.9 % (ref 11.5–14.5)
ERYTHROCYTE [SEDIMENTATION RATE] IN BLOOD: 129 MM/HR (ref 0–20)
GLOBULIN SER CALC-MCNC: 5.6 G/DL (ref 2–4)
GLUCOSE SERPL-MCNC: 154 MG/DL (ref 65–100)
GLUCOSE UR STRIP.AUTO-MCNC: >300 MG/DL
HCT VFR BLD AUTO: 32.2 % (ref 36.6–50.3)
HGB BLD-MCNC: 10.3 G/DL (ref 12.1–17)
HGB UR QL STRIP: NEGATIVE
IMM GRANULOCYTES # BLD AUTO: 0.1 K/UL (ref 0–0.04)
IMM GRANULOCYTES NFR BLD AUTO: 1 % (ref 0–0.5)
KETONES UR QL STRIP.AUTO: NEGATIVE MG/DL
LEUKOCYTE ESTERASE UR QL STRIP.AUTO: ABNORMAL
LYMPHOCYTES # BLD: 1.7 K/UL (ref 0.8–3.5)
LYMPHOCYTES NFR BLD: 12 % (ref 12–49)
MCH RBC QN AUTO: 30 PG (ref 26–34)
MCHC RBC AUTO-ENTMCNC: 32 G/DL (ref 30–36.5)
MCV RBC AUTO: 93.9 FL (ref 80–99)
MONOCYTES # BLD: 0.7 K/UL (ref 0–1)
MONOCYTES NFR BLD: 5 % (ref 5–13)
NEUTS SEG # BLD: 10.9 K/UL (ref 1.8–8)
NEUTS SEG NFR BLD: 81 % (ref 32–75)
NITRITE UR QL STRIP.AUTO: NEGATIVE
NRBC # BLD: 0 K/UL (ref 0–0.01)
NRBC BLD-RTO: 0 PER 100 WBC
PH UR STRIP: 5 [PH] (ref 5–8)
PLATELET # BLD AUTO: 436 K/UL (ref 150–400)
PMV BLD AUTO: 10 FL (ref 8.9–12.9)
POTASSIUM SERPL-SCNC: 3.8 MMOL/L (ref 3.5–5.1)
PROT SERPL-MCNC: 8.1 G/DL (ref 6.4–8.2)
PROT UR STRIP-MCNC: NEGATIVE MG/DL
RBC # BLD AUTO: 3.43 M/UL (ref 4.1–5.7)
RBC #/AREA URNS HPF: ABNORMAL /HPF (ref 0–5)
SODIUM SERPL-SCNC: 135 MMOL/L (ref 136–145)
SP GR UR REFRACTOMETRY: 1.01 (ref 1–1.03)
UA: UC IF INDICATED,UAUC: ABNORMAL
UROBILINOGEN UR QL STRIP.AUTO: 0.1 EU/DL (ref 0.1–1)
WBC # BLD AUTO: 13.5 K/UL (ref 4.1–11.1)
WBC URNS QL MICRO: >100 /HPF (ref 0–4)

## 2022-07-25 PROCEDURE — 99285 EMERGENCY DEPT VISIT HI MDM: CPT

## 2022-07-25 PROCEDURE — G0378 HOSPITAL OBSERVATION PER HR: HCPCS

## 2022-07-25 PROCEDURE — 74011000250 HC RX REV CODE- 250: Performed by: EMERGENCY MEDICINE

## 2022-07-25 PROCEDURE — 85652 RBC SED RATE AUTOMATED: CPT

## 2022-07-25 PROCEDURE — 87040 BLOOD CULTURE FOR BACTERIA: CPT

## 2022-07-25 PROCEDURE — 86140 C-REACTIVE PROTEIN: CPT

## 2022-07-25 PROCEDURE — 85025 COMPLETE CBC W/AUTO DIFF WBC: CPT

## 2022-07-25 PROCEDURE — 81001 URINALYSIS AUTO W/SCOPE: CPT

## 2022-07-25 PROCEDURE — 96375 TX/PRO/DX INJ NEW DRUG ADDON: CPT

## 2022-07-25 PROCEDURE — 65270000029 HC RM PRIVATE

## 2022-07-25 PROCEDURE — 83036 HEMOGLOBIN GLYCOSYLATED A1C: CPT

## 2022-07-25 PROCEDURE — 83880 ASSAY OF NATRIURETIC PEPTIDE: CPT

## 2022-07-25 PROCEDURE — 74011250636 HC RX REV CODE- 250/636: Performed by: EMERGENCY MEDICINE

## 2022-07-25 PROCEDURE — 96376 TX/PRO/DX INJ SAME DRUG ADON: CPT

## 2022-07-25 PROCEDURE — 87086 URINE CULTURE/COLONY COUNT: CPT

## 2022-07-25 PROCEDURE — 96374 THER/PROPH/DIAG INJ IV PUSH: CPT

## 2022-07-25 PROCEDURE — 74176 CT ABD & PELVIS W/O CONTRAST: CPT

## 2022-07-25 PROCEDURE — 80053 COMPREHEN METABOLIC PANEL: CPT

## 2022-07-25 PROCEDURE — 36415 COLL VENOUS BLD VENIPUNCTURE: CPT

## 2022-07-25 RX ORDER — HYDROMORPHONE HYDROCHLORIDE 1 MG/ML
1 INJECTION, SOLUTION INTRAMUSCULAR; INTRAVENOUS; SUBCUTANEOUS ONCE
Status: COMPLETED | OUTPATIENT
Start: 2022-07-25 | End: 2022-07-25

## 2022-07-25 RX ORDER — FLUTICASONE PROPIONATE 50 MCG
2 SPRAY, SUSPENSION (ML) NASAL 2 TIMES DAILY
Status: DISCONTINUED | OUTPATIENT
Start: 2022-07-26 | End: 2022-07-30 | Stop reason: HOSPADM

## 2022-07-25 RX ORDER — ACETAMINOPHEN 325 MG/1
650 TABLET ORAL
Status: DISCONTINUED | OUTPATIENT
Start: 2022-07-25 | End: 2022-07-26

## 2022-07-25 RX ORDER — B-COMPLEX WITH VITAMIN C
1 TABLET ORAL DAILY
Status: DISCONTINUED | OUTPATIENT
Start: 2022-07-26 | End: 2022-07-26

## 2022-07-25 RX ORDER — TAMSULOSIN HYDROCHLORIDE 0.4 MG/1
0.8 CAPSULE ORAL DAILY
Status: DISCONTINUED | OUTPATIENT
Start: 2022-07-26 | End: 2022-07-30 | Stop reason: HOSPADM

## 2022-07-25 RX ORDER — MAGNESIUM SULFATE 100 %
4 CRYSTALS MISCELLANEOUS AS NEEDED
Status: DISCONTINUED | OUTPATIENT
Start: 2022-07-25 | End: 2022-07-30 | Stop reason: HOSPADM

## 2022-07-25 RX ORDER — ONDANSETRON 4 MG/1
4 TABLET, ORALLY DISINTEGRATING ORAL
Status: DISCONTINUED | OUTPATIENT
Start: 2022-07-25 | End: 2022-07-30 | Stop reason: HOSPADM

## 2022-07-25 RX ORDER — GABAPENTIN 300 MG/1
300 CAPSULE ORAL 4 TIMES DAILY
Status: DISCONTINUED | OUTPATIENT
Start: 2022-07-26 | End: 2022-07-30 | Stop reason: HOSPADM

## 2022-07-25 RX ORDER — LANOLIN ALCOHOL/MO/W.PET/CERES
1 CREAM (GRAM) TOPICAL
Status: DISCONTINUED | OUTPATIENT
Start: 2022-07-26 | End: 2022-07-30 | Stop reason: HOSPADM

## 2022-07-25 RX ORDER — LANOLIN ALCOHOL/MO/W.PET/CERES
800 CREAM (GRAM) TOPICAL 2 TIMES DAILY
Status: DISCONTINUED | OUTPATIENT
Start: 2022-07-26 | End: 2022-07-30 | Stop reason: HOSPADM

## 2022-07-25 RX ORDER — OXYCODONE HYDROCHLORIDE 5 MG/1
5 TABLET ORAL EVERY 12 HOURS
Status: DISCONTINUED | OUTPATIENT
Start: 2022-07-25 | End: 2022-07-26

## 2022-07-25 RX ORDER — INSULIN LISPRO 100 [IU]/ML
INJECTION, SOLUTION INTRAVENOUS; SUBCUTANEOUS
Status: DISCONTINUED | OUTPATIENT
Start: 2022-07-26 | End: 2022-07-30 | Stop reason: HOSPADM

## 2022-07-25 RX ORDER — COLCHICINE 0.6 MG/1
0.6 TABLET ORAL DAILY
Status: DISCONTINUED | OUTPATIENT
Start: 2022-07-26 | End: 2022-07-30 | Stop reason: HOSPADM

## 2022-07-25 RX ORDER — ALBUTEROL SULFATE 90 UG/1
1 AEROSOL, METERED RESPIRATORY (INHALATION)
Status: DISCONTINUED | OUTPATIENT
Start: 2022-07-25 | End: 2022-07-30 | Stop reason: HOSPADM

## 2022-07-25 RX ORDER — ACETAMINOPHEN 650 MG/1
650 SUPPOSITORY RECTAL
Status: DISCONTINUED | OUTPATIENT
Start: 2022-07-25 | End: 2022-07-26

## 2022-07-25 RX ORDER — ONDANSETRON 2 MG/ML
4 INJECTION INTRAMUSCULAR; INTRAVENOUS
Status: DISCONTINUED | OUTPATIENT
Start: 2022-07-25 | End: 2022-07-30 | Stop reason: HOSPADM

## 2022-07-25 RX ORDER — BUMETANIDE 1 MG/1
2 TABLET ORAL 2 TIMES DAILY
Status: DISCONTINUED | OUTPATIENT
Start: 2022-07-26 | End: 2022-07-30 | Stop reason: HOSPADM

## 2022-07-25 RX ORDER — GUAIFENESIN 100 MG/5ML
81 LIQUID (ML) ORAL DAILY
Status: DISCONTINUED | OUTPATIENT
Start: 2022-07-26 | End: 2022-07-30 | Stop reason: HOSPADM

## 2022-07-25 RX ORDER — POLYETHYLENE GLYCOL 3350 17 G/17G
17 POWDER, FOR SOLUTION ORAL DAILY PRN
Status: DISCONTINUED | OUTPATIENT
Start: 2022-07-25 | End: 2022-07-30 | Stop reason: HOSPADM

## 2022-07-25 RX ORDER — ASCORBIC ACID 500 MG
1000 TABLET ORAL 2 TIMES DAILY
Status: DISCONTINUED | OUTPATIENT
Start: 2022-07-26 | End: 2022-07-30 | Stop reason: HOSPADM

## 2022-07-25 RX ORDER — SODIUM CHLORIDE 9 MG/ML
75 INJECTION, SOLUTION INTRAVENOUS CONTINUOUS
Status: DISCONTINUED | OUTPATIENT
Start: 2022-07-25 | End: 2022-07-25

## 2022-07-25 RX ORDER — TIZANIDINE 2 MG/1
2 TABLET ORAL 3 TIMES DAILY
Status: DISCONTINUED | OUTPATIENT
Start: 2022-07-26 | End: 2022-07-30 | Stop reason: HOSPADM

## 2022-07-25 RX ORDER — INSULIN GLARGINE 100 [IU]/ML
64 INJECTION, SOLUTION SUBCUTANEOUS DAILY
Status: DISCONTINUED | OUTPATIENT
Start: 2022-07-27 | End: 2022-07-30 | Stop reason: HOSPADM

## 2022-07-25 RX ADMIN — CEFTRIAXONE SODIUM 1 G: 1 INJECTION, POWDER, FOR SOLUTION INTRAMUSCULAR; INTRAVENOUS at 21:15

## 2022-07-25 RX ADMIN — HYDROMORPHONE HYDROCHLORIDE 1 MG: 1 INJECTION, SOLUTION INTRAMUSCULAR; INTRAVENOUS; SUBCUTANEOUS at 19:40

## 2022-07-25 RX ADMIN — SODIUM CHLORIDE 75 ML/HR: 9 INJECTION, SOLUTION INTRAVENOUS at 22:30

## 2022-07-25 RX ADMIN — HYDROMORPHONE HYDROCHLORIDE 1 MG: 1 INJECTION, SOLUTION INTRAMUSCULAR; INTRAVENOUS; SUBCUTANEOUS at 20:40

## 2022-07-25 NOTE — Clinical Note
Patient Class[de-identified] OBSERVATION [650]   Type of Bed: Medical [8]   Reason for Observation: UTI   Admitting Diagnosis: UTI (urinary tract infection) [026791]   Admitting Physician: Hillary Weber [[id number]]   Attending Physician: Hillary Weber [[id number]]

## 2022-07-25 NOTE — ED TRIAGE NOTES
Pt arrives with back pain and rib pain that's been ongoing x1 week. Chronically on oxygen at home. Pmhx COPD.

## 2022-07-25 NOTE — ED PROVIDER NOTES
EMERGENCY DEPARTMENT HISTORY AND PHYSICAL EXAM      Date: 7/25/2022  Patient Name: Darvin Homans    History of Presenting Illness     Chief Complaint   Patient presents with    Back Pain       History Provided By: Patient    HPI: Darvin Homans, 78 y.o. male with history of arthritis, CAD, COPD, diabetes, gout, sleep apnea, and hypertension who presents with low back pain. States symptoms have been present for 3 weeks and worsening. Pain is 10/10, constant, severe, middle back radiating down the right leg, worse with movement. Denies any falls. He is not having any fevers. He is having some mild urinary incontinence however this is not significantly unusual for him. No dysuria, fevers, history of IV drug use. He is having some difficulty with ambulation due to his back pain. He has been using a walker. There are no other complaints, changes, or physical findings at this time. PCP: Alie Deluna MD    Current Outpatient Medications   Medication Sig Dispense Refill    tiZANidine (ZANAFLEX) 2 mg tablet Take 1 Tablet by mouth three (3) times daily for 5 days. 15 Tablet 0    insulin glargine,hum.rec.anlog (TOUJEO MAX U-300 SOLOSTAR SC) 80 Units by SubCUTAneous route two (2) times a day. 80 units in am, 34 units in pm      b complex vitamins tablet Take 1 Tablet by mouth daily. ammonium lactate (AMLACTIN) 12 % topical cream Apply  to affected area as needed for Other (xerosis). rub in to affected area well  Indications: dry skin 280 g 0    vitamin E, dl,tocopheryl acet, (vitamin E acetate) 400 unit capsule Take 2 Capsules by mouth two (2) times a day. 100 Capsule 0    ascorbic acid, vitamin C, (VITAMIN C) 500 mg tablet Take 1,000 mg by mouth two (2) times a day. Indications: inadequate vitamin C      tamsulosin (FLOMAX) 0.4 mg capsule Take 0.8 mg by mouth daily. tiotropium (SPIRIVA) 18 mcg inhalation capsule Take 1 Capsule by inhalation daily.       bumetanide (BUMEX) 2 mg tablet Take 2 mg by mouth two (2) times a day. ferrous sulfate (Iron) 325 mg (65 mg iron) tablet Take  by mouth two (2) times daily (after meals). aspirin 81 mg chewable tablet Take 81 mg by mouth daily. flunisolide (NASAREL) 25 mcg (0.025 %) spry 2 Sprays two (2) times a day. albuterol (PROVENTIL HFA, VENTOLIN HFA, PROAIR HFA) 90 mcg/actuation inhaler Take 1 Puff by inhalation every four (4) hours as needed for Wheezing or Shortness of Breath. colchicine 0.6 mg tablet Take 0.6 mg by mouth daily. gabapentin (NEURONTIN) 300 mg capsule Take 300 mg by mouth four (4) times daily. oxyCODONE IR (ROXICODONE) 5 mg immediate release tablet Take 5 mg by mouth every twelve (12) hours. insulin lispro (HUMALOG) 100 unit/mL injection by SubCUTAneous route. Sliding scale         Past History     Past Medical History:  Past Medical History:   Diagnosis Date    Arthritis     CAD (coronary artery disease)     Chronic obstructive pulmonary disease (HCC)     Diabetes ([facility] [address]     Gout     Hypertension     Ill-defined condition     Sleep apnea         Past Surgical History:  Past Surgical History:   Procedure Laterality Date    HX ORTHOPAEDIC      HX PACEMAKER      HX VEIN ABLATION ADHESIVE         Family History:  Family History   Problem Relation Age of Onset    Heart Disease Mother     Kidney Disease Mother     Hypertension Mother     Diabetes Mother     Heart Disease Father     Hypertension Father     Diabetes Sister     Diabetes Brother        Social History:  Social History     Tobacco Use    Smoking status: Never    Smokeless tobacco: Never   Vaping Use    Vaping Use: Never used   Substance Use Topics    Alcohol use: Not Currently    Drug use: Never       Allergies:   Allergies   Allergen Reactions    Elavil Angioedema    Naproxen Unknown (comments)     Pt not sure of reaction    Sulfa (Sulfonamide Antibiotics) Nausea and Vomiting        Review of Systems     Review of Systems   10 point review systems negative other than stated in the HPI    Physical Exam   Constitutional: No acute distress. Well-nourished. Skin: No rash. ENT: No rhinorrhea. No cough. Head is normocephalic and atraumatic. Eye: No proptosis or conjunctival injections. Respiratory: No apparent respiratory distress. Gastrointestinal: Nondistended. Musculoskeletal: Obvious pain with range of motion of the back and sitting up. 4/5 strength in lower extremities bilaterally. No significant tenderness to the midline lumbar spine. Psychiatric: Cooperative. Appropriate mood and affect. Lab and Diagnostic Study Results     Labs -     Recent Results (from the past 12 hour(s))   CBC WITH AUTOMATED DIFF    Collection Time: 07/25/22  7:33 PM   Result Value Ref Range    WBC 13.5 (H) 4.1 - 11.1 K/uL    RBC 3.43 (L) 4.10 - 5.70 M/uL    HGB 10.3 (L) 12.1 - 17.0 g/dL    HCT 32.2 (L) 36.6 - 50.3 %    MCV 93.9 80.0 - 99.0 FL    MCH 30.0 26.0 - 34.0 PG    MCHC 32.0 30.0 - 36.5 g/dL    RDW 12.9 11.5 - 14.5 %    PLATELET 254 (H) 449 - 400 K/uL    MPV 10.0 8.9 - 12.9 FL    NRBC 0.0 0.0  WBC    ABSOLUTE NRBC 0.00 0.00 - 0.01 K/uL    NEUTROPHILS 81 (H) 32 - 75 %    LYMPHOCYTES 12 12 - 49 %    MONOCYTES 5 5 - 13 %    EOSINOPHILS 1 0 - 7 %    BASOPHILS 0 0 - 1 %    IMMATURE GRANULOCYTES 1 (H) 0 - 0.5 %    ABS. NEUTROPHILS 10.9 (H) 1.8 - 8.0 K/UL    ABS. LYMPHOCYTES 1.7 0.8 - 3.5 K/UL    ABS. MONOCYTES 0.7 0.0 - 1.0 K/UL    ABS. EOSINOPHILS 0.2 0.0 - 0.4 K/UL    ABS. BASOPHILS 0.0 0.0 - 0.1 K/UL    ABS. IMM.  GRANS. 0.1 (H) 0.00 - 0.04 K/UL    DF AUTOMATED     METABOLIC PANEL, COMPREHENSIVE    Collection Time: 07/25/22  7:33 PM   Result Value Ref Range    Sodium 135 (L) 136 - 145 mmol/L    Potassium 3.8 3.5 - 5.1 mmol/L    Chloride 101 97 - 108 mmol/L    CO2 27 21 - 32 mmol/L    Anion gap 7 5 - 15 mmol/L    Glucose 154 (H) 65 - 100 mg/dL    BUN 28 (H) 6 - 20 mg/dL    Creatinine 1.55 (H) 0.70 - 1.30 mg/dL    BUN/Creatinine ratio 18 12 - 20      GFR est AA 54 (L) >60 ml/min/1.73m2    GFR est non-AA 45 (L) >60 ml/min/1.73m2    Calcium 8.9 8.5 - 10.1 mg/dL    Bilirubin, total 0.3 0.2 - 1.0 mg/dL    AST (SGOT) 25 15 - 37 U/L    ALT (SGPT) 39 12 - 78 U/L    Alk. phosphatase 83 45 - 117 U/L    Protein, total 8.1 6.4 - 8.2 g/dL    Albumin 2.5 (L) 3.5 - 5.0 g/dL    Globulin 5.6 (H) 2.0 - 4.0 g/dL    A-G Ratio 0.4 (L) 1.1 - 2.2     SED RATE (ESR)    Collection Time: 07/25/22  7:33 PM   Result Value Ref Range    Sed rate, automated 129 (H) 0 - 20 mm/hr   C REACTIVE PROTEIN, QT    Collection Time: 07/25/22  7:33 PM   Result Value Ref Range    C-Reactive protein 10.60 (H) 0.00 - 0.60 mg/dL   NT-PRO BNP    Collection Time: 07/25/22  7:33 PM   Result Value Ref Range    NT pro-BNP 1,465 (H) <125 pg/mL   URINALYSIS W/ REFLEX CULTURE    Collection Time: 07/25/22  7:33 PM    Specimen: Urine   Result Value Ref Range    Color Yellow/Straw      Appearance Turbid (A) Clear      Specific gravity 1.015 1.003 - 1.030      pH (UA) 5.0 5.0 - 8.0      Protein Negative Negative mg/dL    Glucose >300 (A) Negative mg/dL    Ketone Negative Negative mg/dL    Bilirubin Negative Negative      Blood Negative Negative      Urobilinogen 0.1 0.1 - 1.0 EU/dL    Nitrites Negative Negative      Leukocyte Esterase Large (A) Negative      WBC >100 (H) 0 - 4 /hpf    RBC 5-10 0 - 5 /hpf    Bacteria Negative Negative /hpf    UA:UC IF INDICATED Urine Culture Ordered (A) Culture not indicated by UA result         Radiologic Studies -   [unfilled]  CT Results  (Last 48 hours)                 07/25/22 2024  CT ABD PELV WO CONT Final result    Impression:      1. Bilateral nonobstructing renal stones. No hydronephrosis   2. Equivocal nodularity of the liver to suggest cirrhosis       Narrative:  EXAM: CT ABD PELV WO CONT       INDICATION: low back pain, hx of kidney stones       COMPARISON: 7/11/2022       IV CONTRAST: None.        ORAL CONTRAST: None       TECHNIQUE:    Thin axial images were obtained through the abdomen and pelvis. Coronal and   sagittal reformats were generated. CT dose reduction was achieved through use of   a standardized protocol tailored for this examination and automatic exposure   control for dose modulation. The absence of intravenous contrast material reduces the sensitivity for   evaluation of the vasculature and solid organs. FINDINGS:    LOWER THORAX: No significant abnormality in the incidentally imaged lower chest.   LIVER: Equivocal nodularity   BILIARY TREE: Gallbladder is within normal limits. CBD is not dilated. SPLEEN: within normal limits. PANCREAS: No focal abnormality. ADRENALS: 3.1 cm left adrenal nodule measures 10 Hounsfield units compatible   with an adenoma. Right adrenal gland is normal   KIDNEYS/URETERS: Bilateral renal stones. No hydronephrosis   STOMACH: Unremarkable. SMALL BOWEL: No dilatation or wall thickening. COLON: No dilatation or wall thickening. APPENDIX: Not identified   PERITONEUM: No ascites or pneumoperitoneum. RETROPERITONEUM: No lymphadenopathy or aortic aneurysm. REPRODUCTIVE ORGANS: Not enlarged   URINARY BLADDER: No mass or calculus. BONES: No destructive bone lesion. ABDOMINAL WALL: Partially imaged due to patient's large body habitus. Umbilical   hernia containing fat   ADDITIONAL COMMENTS: N/A                 CXR Results  (Last 48 hours)      None            Medical Decision Making and ED Course     - I am the first and primary provider for this patient AND AM THE PRIMARY PROVIDER OF RECORD. I reviewed the vital signs, available nursing notes, past medical history, past surgical history, family history and social history. - Initial assessment performed. The patients presenting problems have been discussed, and the staff are in agreement with the care plan formulated and outlined with them. I have encouraged them to ask questions as they arise throughout their visit.     Vital Signs-Reviewed the patient's vital signs. Patient Vitals for the past 12 hrs:   Temp Pulse Resp BP SpO2   07/25/22 1834 97.9 °F (36.6 °C) 91 18 123/77 99 %     MDM  The differential diagnosis is discitis, UTI, pyelonephritis, kidney stones, muscle sprain. Patient does have a UTI on work-up. He also has elevated ESR and CRP. Pain is severe. He was given multiple doses of Dilaudid. He was given Rocephin for UTI. I did discuss this case with Dr. Nic Gruber who will accept him for admission. I also discussed this patient with Dr. Merline Kirsten, the spinal specialist who recommended admission for likely antibiotics and bone scan. There is some concern for discitis empirically. He will not be able to get an MRI due to pacemaker. Symptoms may also all be related to acute on chronic back pain and urinary tract infection. Disposition     Disposition: Admitted to Dr. Nic Gruber    Diagnosis/Clinical Impression     Clinical Impression:   1. Urinary tract infection without hematuria, site unspecified    2. Acute right-sided low back pain with right-sided sciatica           Attestations:  Anni Clifford, DO    Please note that this dictation was completed with [employer], the computer voice recognition software. Quite often unanticipated grammatical, syntax, homophones, and other interpretive errors are inadvertently transcribed by the computer software. Please disregard these errors. Please excuse any errors that have escaped final proofreading. Thank you.

## 2022-07-26 ENCOUNTER — APPOINTMENT (OUTPATIENT)
Dept: GENERAL RADIOLOGY | Age: 67
DRG: 690 | End: 2022-07-26
Attending: PHYSICIAN ASSISTANT
Payer: MEDICARE

## 2022-07-26 LAB
ALBUMIN SERPL-MCNC: 2.5 G/DL (ref 3.5–5)
ALBUMIN/GLOB SERPL: 0.6 {RATIO} (ref 1.1–2.2)
ALP SERPL-CCNC: 81 U/L (ref 45–117)
ALT SERPL-CCNC: 36 U/L (ref 12–78)
ANION GAP SERPL CALC-SCNC: 6 MMOL/L (ref 5–15)
AST SERPL W P-5'-P-CCNC: 23 U/L (ref 15–37)
BASOPHILS # BLD: 0 K/UL (ref 0–0.1)
BASOPHILS NFR BLD: 0 % (ref 0–1)
BILIRUB SERPL-MCNC: 0.3 MG/DL (ref 0.2–1)
BUN SERPL-MCNC: 22 MG/DL (ref 6–20)
BUN/CREAT SERPL: 18 (ref 12–20)
CA-I BLD-MCNC: 8.6 MG/DL (ref 8.5–10.1)
CHLORIDE SERPL-SCNC: 106 MMOL/L (ref 97–108)
CO2 SERPL-SCNC: 27 MMOL/L (ref 21–32)
CREAT SERPL-MCNC: 1.2 MG/DL (ref 0.7–1.3)
DIFFERENTIAL METHOD BLD: ABNORMAL
EOSINOPHIL # BLD: 0.2 K/UL (ref 0–0.4)
EOSINOPHIL NFR BLD: 2 % (ref 0–7)
ERYTHROCYTE [DISTWIDTH] IN BLOOD BY AUTOMATED COUNT: 12.9 % (ref 11.5–14.5)
EST. AVERAGE GLUCOSE BLD GHB EST-MCNC: 114 MG/DL
GLOBULIN SER CALC-MCNC: 4.3 G/DL (ref 2–4)
GLUCOSE BLD STRIP.AUTO-MCNC: 109 MG/DL (ref 65–117)
GLUCOSE BLD STRIP.AUTO-MCNC: 144 MG/DL (ref 65–117)
GLUCOSE BLD STRIP.AUTO-MCNC: 81 MG/DL (ref 65–117)
GLUCOSE BLD STRIP.AUTO-MCNC: 93 MG/DL (ref 65–117)
GLUCOSE SERPL-MCNC: 131 MG/DL (ref 65–100)
HBA1C MFR BLD: 5.6 % (ref 4–5.6)
HCT VFR BLD AUTO: 32.1 % (ref 36.6–50.3)
HGB BLD-MCNC: 10.3 G/DL (ref 12.1–17)
IMM GRANULOCYTES # BLD AUTO: 0.1 K/UL (ref 0–0.04)
IMM GRANULOCYTES NFR BLD AUTO: 1 % (ref 0–0.5)
LYMPHOCYTES # BLD: 1.7 K/UL (ref 0.8–3.5)
LYMPHOCYTES NFR BLD: 15 % (ref 12–49)
MCH RBC QN AUTO: 30 PG (ref 26–34)
MCHC RBC AUTO-ENTMCNC: 32.1 G/DL (ref 30–36.5)
MCV RBC AUTO: 93.6 FL (ref 80–99)
MONOCYTES # BLD: 0.9 K/UL (ref 0–1)
MONOCYTES NFR BLD: 8 % (ref 5–13)
NEUTS SEG # BLD: 8.6 K/UL (ref 1.8–8)
NEUTS SEG NFR BLD: 74 % (ref 32–75)
NRBC # BLD: 0 K/UL (ref 0–0.01)
NRBC BLD-RTO: 0 PER 100 WBC
PERFORMED BY, TECHID: ABNORMAL
PERFORMED BY, TECHID: NORMAL
PLATELET # BLD AUTO: 433 K/UL (ref 150–400)
PMV BLD AUTO: 10.5 FL (ref 8.9–12.9)
POTASSIUM SERPL-SCNC: 4 MMOL/L (ref 3.5–5.1)
PROT SERPL-MCNC: 6.8 G/DL (ref 6.4–8.2)
RBC # BLD AUTO: 3.43 M/UL (ref 4.1–5.7)
SODIUM SERPL-SCNC: 139 MMOL/L (ref 136–145)
WBC # BLD AUTO: 11.5 K/UL (ref 4.1–11.1)

## 2022-07-26 PROCEDURE — 99222 1ST HOSP IP/OBS MODERATE 55: CPT | Performed by: INTERNAL MEDICINE

## 2022-07-26 PROCEDURE — 74011250636 HC RX REV CODE- 250/636: Performed by: PHYSICIAN ASSISTANT

## 2022-07-26 PROCEDURE — 74011250637 HC RX REV CODE- 250/637: Performed by: PHYSICIAN ASSISTANT

## 2022-07-26 PROCEDURE — 94761 N-INVAS EAR/PLS OXIMETRY MLT: CPT

## 2022-07-26 PROCEDURE — 74011250636 HC RX REV CODE- 250/636: Performed by: INTERNAL MEDICINE

## 2022-07-26 PROCEDURE — 82962 GLUCOSE BLOOD TEST: CPT

## 2022-07-26 PROCEDURE — 80053 COMPREHEN METABOLIC PANEL: CPT

## 2022-07-26 PROCEDURE — 74011250637 HC RX REV CODE- 250/637: Performed by: FAMILY MEDICINE

## 2022-07-26 PROCEDURE — 77010033678 HC OXYGEN DAILY

## 2022-07-26 PROCEDURE — 74011000258 HC RX REV CODE- 258: Performed by: INTERNAL MEDICINE

## 2022-07-26 PROCEDURE — 73560 X-RAY EXAM OF KNEE 1 OR 2: CPT

## 2022-07-26 PROCEDURE — 74011636637 HC RX REV CODE- 636/637: Performed by: FAMILY MEDICINE

## 2022-07-26 PROCEDURE — 65270000029 HC RM PRIVATE

## 2022-07-26 PROCEDURE — 85025 COMPLETE CBC W/AUTO DIFF WBC: CPT

## 2022-07-26 RX ORDER — OXYCODONE HYDROCHLORIDE 5 MG/1
5 TABLET ORAL
Status: DISCONTINUED | OUTPATIENT
Start: 2022-07-26 | End: 2022-07-28

## 2022-07-26 RX ORDER — OXYCODONE HYDROCHLORIDE 5 MG/1
5 TABLET ORAL
Status: DISCONTINUED | OUTPATIENT
Start: 2022-07-26 | End: 2022-07-26

## 2022-07-26 RX ORDER — VITAMIN E 268 MG
800 CAPSULE ORAL 2 TIMES DAILY
COMMUNITY
End: 2022-07-29

## 2022-07-26 RX ORDER — INSULIN GLARGINE 100 [IU]/ML
27 INJECTION, SOLUTION SUBCUTANEOUS
Status: DISCONTINUED | OUTPATIENT
Start: 2022-07-26 | End: 2022-07-30 | Stop reason: HOSPADM

## 2022-07-26 RX ORDER — OXYCODONE HYDROCHLORIDE 5 MG/1
10 TABLET ORAL
Status: DISCONTINUED | OUTPATIENT
Start: 2022-07-26 | End: 2022-07-28

## 2022-07-26 RX ORDER — VITAMIN B COMPLEX
1 CAPSULE ORAL DAILY
Status: DISCONTINUED | OUTPATIENT
Start: 2022-07-26 | End: 2022-07-30 | Stop reason: HOSPADM

## 2022-07-26 RX ORDER — ACETAMINOPHEN 500 MG
1000 TABLET ORAL EVERY 6 HOURS
Status: DISCONTINUED | OUTPATIENT
Start: 2022-07-26 | End: 2022-07-30 | Stop reason: HOSPADM

## 2022-07-26 RX ORDER — MORPHINE SULFATE 2 MG/ML
2 INJECTION, SOLUTION INTRAMUSCULAR; INTRAVENOUS
Status: DISCONTINUED | OUTPATIENT
Start: 2022-07-26 | End: 2022-07-28

## 2022-07-26 RX ADMIN — BUMETANIDE 2 MG: 1 TABLET ORAL at 08:39

## 2022-07-26 RX ADMIN — BUMETANIDE 2 MG: 1 TABLET ORAL at 21:41

## 2022-07-26 RX ADMIN — GABAPENTIN 300 MG: 300 CAPSULE ORAL at 21:41

## 2022-07-26 RX ADMIN — GABAPENTIN 300 MG: 300 CAPSULE ORAL at 08:39

## 2022-07-26 RX ADMIN — OXYCODONE HYDROCHLORIDE AND ACETAMINOPHEN 1000 MG: 500 TABLET ORAL at 21:41

## 2022-07-26 RX ADMIN — OXYCODONE 5 MG: 5 TABLET ORAL at 08:39

## 2022-07-26 RX ADMIN — ACETAMINOPHEN 650 MG: 325 TABLET ORAL at 08:39

## 2022-07-26 RX ADMIN — TIZANIDINE 2 MG: 2 TABLET ORAL at 22:45

## 2022-07-26 RX ADMIN — TAMSULOSIN HYDROCHLORIDE 0.8 MG: 0.4 CAPSULE ORAL at 08:39

## 2022-07-26 RX ADMIN — FLUTICASONE PROPIONATE 2 SPRAY: 50 SPRAY, METERED NASAL at 21:47

## 2022-07-26 RX ADMIN — FERROUS SULFATE TAB 325 MG (65 MG ELEMENTAL FE) 325 MG: 325 (65 FE) TAB at 08:39

## 2022-07-26 RX ADMIN — TIZANIDINE 2 MG: 2 TABLET ORAL at 09:45

## 2022-07-26 RX ADMIN — Medication 1 CAPSULE: at 09:49

## 2022-07-26 RX ADMIN — MORPHINE SULFATE 2 MG: 2 INJECTION, SOLUTION INTRAMUSCULAR; INTRAVENOUS at 21:40

## 2022-07-26 RX ADMIN — GABAPENTIN 300 MG: 300 CAPSULE ORAL at 17:24

## 2022-07-26 RX ADMIN — GABAPENTIN 300 MG: 300 CAPSULE ORAL at 12:29

## 2022-07-26 RX ADMIN — Medication 800 UNITS: at 08:39

## 2022-07-26 RX ADMIN — OXYCODONE 5 MG: 5 TABLET ORAL at 00:01

## 2022-07-26 RX ADMIN — FLUTICASONE PROPIONATE 2 SPRAY: 50 SPRAY, METERED NASAL at 09:45

## 2022-07-26 RX ADMIN — TIZANIDINE 2 MG: 2 TABLET ORAL at 16:07

## 2022-07-26 RX ADMIN — OXYCODONE 5 MG: 5 TABLET ORAL at 03:10

## 2022-07-26 RX ADMIN — ACETAMINOPHEN 1000 MG: 500 TABLET ORAL at 17:24

## 2022-07-26 RX ADMIN — ASPIRIN 81 MG CHEWABLE TABLET 81 MG: 81 TABLET CHEWABLE at 08:39

## 2022-07-26 RX ADMIN — OXYCODONE HYDROCHLORIDE AND ACETAMINOPHEN 1000 MG: 500 TABLET ORAL at 08:39

## 2022-07-26 RX ADMIN — INSULIN GLARGINE 27 UNITS: 100 INJECTION, SOLUTION SUBCUTANEOUS at 21:41

## 2022-07-26 RX ADMIN — PIPERACILLIN AND TAZOBACTAM 4.5 G: 4; .5 INJECTION, POWDER, FOR SOLUTION INTRAVENOUS at 21:40

## 2022-07-26 RX ADMIN — FERROUS SULFATE TAB 325 MG (65 MG ELEMENTAL FE) 325 MG: 325 (65 FE) TAB at 17:24

## 2022-07-26 RX ADMIN — OXYCODONE 5 MG: 5 TABLET ORAL at 16:07

## 2022-07-26 RX ADMIN — Medication 800 UNITS: at 21:41

## 2022-07-26 RX ADMIN — COLCHICINE 0.6 MG: 0.6 TABLET, FILM COATED ORAL at 08:39

## 2022-07-26 NOTE — H&P
History and Physical    NAME: Misael Hector   :  1955   MRN:  [id number]     Date/Time:  2022 8:20 AM    Patient PCP: Maren Wiley MD  ______________________________________________________________________             Subjective:     CHIEF COMPLAINT: UTI        HISTORY OF PRESENT ILLNESS:       Patient is a 78y.o. year old male with a significant PMH of morbid obesity, DM, HTN, CHF, COPD, gout, sleep apnea chronic venous stasis ulcers more in the left leg than right CAD, hx of L5 discectomy and recent MAURICIO presented to the ED for low back pain for the past three weeks. The patient says that the pain is in his lower back and radiates to his right flank and down his left leg. The pain is aggravated by movement of the right leg and he rates it as a 10/10. The pain improves when he lays still although he still has occasional shooting pain. Patient has current constipation. Patient denies chest pain, cough, SOB, changes in vision.     WBC 13.5k  BNP 1465  CRP 10.6  Albumin 2.5  >100 WBCs on UA w/ LE    Past Medical History:   Diagnosis Date    Arthritis     CAD (coronary artery disease)     Chronic obstructive pulmonary disease (HCC)     Diabetes (HCC)     Gout     Hypertension     Ill-defined condition     Sleep apnea         Past Surgical History:   Procedure Laterality Date    HX ORTHOPAEDIC      HX PACEMAKER      HX VEIN ABLATION ADHESIVE         Social History     Tobacco Use    Smoking status: Never    Smokeless tobacco: Never   Substance Use Topics    Alcohol use: Not Currently        Family History   Problem Relation Age of Onset    Heart Disease Mother     Kidney Disease Mother     Hypertension Mother     Diabetes Mother     Heart Disease Father     Hypertension Father     Diabetes Sister     Diabetes Brother        Allergies   Allergen Reactions    Elavil Angioedema    Naproxen Unknown (comments)     Pt not sure of reaction    Sulfa (Sulfonamide Antibiotics) Nausea and Vomiting        Prior to Admission medications    Medication Sig Start Date End Date Taking? Authorizing Provider   vitamin E (AQUA GEMS) 400 unit capsule Take 800 Units by mouth two (2) times a day. Yes Provider, Historical   insulin glargine,hum.rec.anlog (TOUJEO MAX U-300 SOLOSTAR SC) 80 Units by SubCUTAneous route two (2) times a day. 80 units in am, 34 units in pm   Yes Provider, Historical   b complex vitamins tablet Take 1 Tablet by mouth daily. Yes Provider, Historical   ascorbic acid, vitamin C, (VITAMIN C) 500 mg tablet Take 1,000 mg by mouth two (2) times a day. Indications: inadequate vitamin C   Yes Provider, Historical   tamsulosin (FLOMAX) 0.4 mg capsule Take 0.8 mg by mouth daily. Yes Provider, Historical   tiotropium (SPIRIVA) 18 mcg inhalation capsule Take 1 Capsule by inhalation daily. Yes Provider, Historical   bumetanide (BUMEX) 2 mg tablet Take 2 mg by mouth two (2) times a day. Yes Provider, Historical   ferrous sulfate (Iron) 325 mg (65 mg iron) tablet Take  by mouth two (2) times daily (after meals). Yes Provider, Historical   aspirin 81 mg chewable tablet Take 81 mg by mouth daily. Yes Provider, Historical   flunisolide (NASAREL) 25 mcg (0.025 %) spry 2 Sprays two (2) times a day. Yes Provider, Historical   albuterol (PROVENTIL HFA, VENTOLIN HFA, PROAIR HFA) 90 mcg/actuation inhaler Take 1 Puff by inhalation every four (4) hours as needed for Wheezing or Shortness of Breath. Yes Provider, Historical   colchicine 0.6 mg tablet Take 0.6 mg by mouth daily. Yes Provider, Historical   gabapentin (NEURONTIN) 300 mg capsule Take 300 mg by mouth four (4) times daily. Yes Provider, Historical   oxyCODONE IR (ROXICODONE) 5 mg immediate release tablet Take 5 mg by mouth every twelve (12) hours. Yes Provider, Historical   insulin lispro (HUMALOG) 100 unit/mL injection by SubCUTAneous route.  Sliding scale   Yes Provider, Historical   tiZANidine (ZANAFLEX) 2 mg tablet Take 1 Tablet by mouth three (3) times daily for 5 days. 7/21/22 7/26/22  Malissa Christiansen MD   ammonium lactate (AMLACTIN) 12 % topical cream Apply  to affected area as needed for Other (xerosis). rub in to affected area well  Indications: dry skin 6/8/21   Carlos Patel MD   vitamin E, dl,tocopheryl acet, (vitamin E acetate) 400 unit capsule Take 2 Capsules by mouth two (2) times a day.  6/8/21   Soo Sweeney MD         Current Facility-Administered Medications:     oxyCODONE IR (ROXICODONE) tablet 5 mg, 5 mg, Oral, Q6H PRN, Moni Patel MD, 5 mg at 07/26/22 0310    tiZANidine (ZANAFLEX) tablet 2 mg, 2 mg, Oral, TID, Carlos Patel MD    vitamin E acetate capsule 800 Units, 800 Units, Oral, BID, Moni Patel MD    albuterol (PROVENTIL HFA, VENTOLIN HFA, PROAIR HFA) inhaler 1 Puff, 1 Puff, Inhalation, Q4H PRN, Moni Patel MD    ascorbic acid (vitamin C) (VITAMIN C) tablet 1,000 mg, 1,000 mg, Oral, BID, Moni Patel MD    aspirin chewable tablet 81 mg, 81 mg, Oral, DAILY, Moni Patel MD    b-complex with vitamin c tablet 1 Tablet, 1 Tablet, Oral, DAILY, Moni Patel MD    bumetanide (BUMEX) tablet 2 mg, 2 mg, Oral, BID, Moni Patel MD    colchicine tablet 0.6 mg, 0.6 mg, Oral, DAILY, Moni Patel MD    ferrous sulfate tablet 325 mg, 1 Tablet, Oral, BIDPC, Moni Patel MD    fluticasone propionate (FLONASE) 50 mcg/actuation nasal spray 2 Spray, 2 Spray, Both Nostrils, BID, Moni Patel MD    gabapentin (NEURONTIN) capsule 300 mg, 300 mg, Oral, QID, Moni Patel MD    .PHARMACY TO SUBSTITUTE PER PROTOCOL (Reordered from: insulin glargine,hum.rec.anlog ([facility] , , Per Protocol, Moni Patel MD    tamsulosin (FLOMAX) capsule 0.8 mg, 0.8 mg, Oral, DAILY, Moni Patel MD    insulin lispro (HUMALOG) injection, , SubCUTAneous, AC&HS, Moni Patel MD    glucose chewable tablet 16 g, 4 Tablet, Oral, PRN, Carlos Patel MD    glucagon (GLUCAGEN) injection 1 mg, 1 mg, IntraMUSCular, PRN, Moni Patel MD    acetaminophen (TYLENOL) tablet 650 mg, 650 mg, Oral, Q6H PRN **OR** acetaminophen (TYLENOL) suppository 650 mg, 650 mg, Rectal, Q6H PRN, Moni Patel MD    polyethylene glycol (MIRALAX) packet 17 g, 17 g, Oral, DAILY PRN, Moni Patel MD    ondansetron (ZOFRAN ODT) tablet 4 mg, 4 mg, Oral, Q8H PRN **OR** ondansetron (ZOFRAN) injection 4 mg, 4 mg, IntraVENous, Q6H PRN, Moni Patel MD    cefTRIAXone (ROCEPHIN) 1 g in sterile water (preservative free) 10 mL IV syringe, 1 g, IntraVENous, Q24H, Moni Patel MD    LAB DATA REVIEWED:    Recent Results (from the past 24 hour(s))   CBC WITH AUTOMATED DIFF    Collection Time: 07/25/22  7:33 PM   Result Value Ref Range    WBC 13.5 (H) 4.1 - 11.1 K/uL    RBC 3.43 (L) 4.10 - 5.70 M/uL    HGB 10.3 (L) 12.1 - 17.0 g/dL    HCT 32.2 (L) 36.6 - 50.3 %    MCV 93.9 80.0 - 99.0 FL    MCH 30.0 26.0 - 34.0 PG    MCHC 32.0 30.0 - 36.5 g/dL    RDW 12.9 11.5 - 14.5 %    PLATELET 133 (H) 066 - 400 K/uL    MPV 10.0 8.9 - 12.9 FL    NRBC 0.0 0.0  WBC    ABSOLUTE NRBC 0.00 0.00 - 0.01 K/uL    NEUTROPHILS 81 (H) 32 - 75 %    LYMPHOCYTES 12 12 - 49 %    MONOCYTES 5 5 - 13 %    EOSINOPHILS 1 0 - 7 %    BASOPHILS 0 0 - 1 %    IMMATURE GRANULOCYTES 1 (H) 0 - 0.5 %    ABS. NEUTROPHILS 10.9 (H) 1.8 - 8.0 K/UL    ABS. LYMPHOCYTES 1.7 0.8 - 3.5 K/UL    ABS. MONOCYTES 0.7 0.0 - 1.0 K/UL    ABS. EOSINOPHILS 0.2 0.0 - 0.4 K/UL    ABS. BASOPHILS 0.0 0.0 - 0.1 K/UL    ABS. IMM.  GRANS. 0.1 (H) 0.00 - 0.04 K/UL    DF AUTOMATED     METABOLIC PANEL, COMPREHENSIVE    Collection Time: 07/25/22  7:33 PM   Result Value Ref Range    Sodium 135 (L) 136 - 145 mmol/L    Potassium 3.8 3.5 - 5.1 mmol/L    Chloride 101 97 - 108 mmol/L    CO2 27 21 - 32 mmol/L    Anion gap 7 5 - 15 mmol/L    Glucose 154 (H) 65 - 100 mg/dL    BUN 28 (H) 6 - 20 mg/dL    Creatinine 1.55 (H) 0.70 - 1.30 mg/dL    BUN/Creatinine ratio 18 12 - 20      GFR est AA 54 (L) >60 ml/min/1.73m2    GFR est non-AA 45 (L) >60 ml/min/1.73m2    Calcium 8.9 8.5 - 10.1 mg/dL    Bilirubin, total 0.3 0.2 - 1.0 mg/dL    AST (SGOT) 25 15 - 37 U/L    ALT (SGPT) 39 12 - 78 U/L    Alk.  phosphatase 83 45 - 117 U/L    Protein, total 8.1 6.4 - 8.2 g/dL    Albumin 2.5 (L) 3.5 - 5.0 g/dL    Globulin 5.6 (H) 2.0 - 4.0 g/dL    A-G Ratio 0.4 (L) 1.1 - 2.2     SED RATE (ESR)    Collection Time: 07/25/22  7:33 PM   Result Value Ref Range    Sed rate, automated 129 (H) 0 - 20 mm/hr   C REACTIVE PROTEIN, QT    Collection Time: 07/25/22  7:33 PM   Result Value Ref Range    C-Reactive protein 10.60 (H) 0.00 - 0.60 mg/dL   NT-PRO BNP    Collection Time: 07/25/22  7:33 PM   Result Value Ref Range    NT pro-BNP 1,465 (H) <125 pg/mL   URINALYSIS W/ REFLEX CULTURE    Collection Time: 07/25/22  7:33 PM    Specimen: Urine   Result Value Ref Range    Color Yellow/Straw      Appearance Turbid (A) Clear      Specific gravity 1.015 1.003 - 1.030      pH (UA) 5.0 5.0 - 8.0      Protein Negative Negative mg/dL    Glucose >300 (A) Negative mg/dL    Ketone Negative Negative mg/dL    Bilirubin Negative Negative      Blood Negative Negative      Urobilinogen 0.1 0.1 - 1.0 EU/dL    Nitrites Negative Negative      Leukocyte Esterase Large (A) Negative      WBC >100 (H) 0 - 4 /hpf    RBC 5-10 0 - 5 /hpf    Bacteria Negative Negative /hpf    UA:UC IF INDICATED Urine Culture Ordered (A) Culture not indicated by UA result     METABOLIC PANEL, COMPREHENSIVE    Collection Time: 07/26/22  5:24 AM   Result Value Ref Range    Sodium 139 136 - 145 mmol/L    Potassium 4.0 3.5 - 5.1 mmol/L    Chloride 106 97 - 108 mmol/L    CO2 27 21 - 32 mmol/L    Anion gap 6 5 - 15 mmol/L    Glucose 131 (H) 65 - 100 mg/dL    BUN 22 (H) 6 - 20 mg/dL    Creatinine 1.20 0.70 - 1.30 mg/dL    BUN/Creatinine ratio 18 12 - 20      GFR est AA >60 >60 ml/min/1.73m2    GFR est non-AA >60 >60 ml/min/1.73m2    Calcium 8.6 8.5 - 10.1 mg/dL Bilirubin, total 0.3 0.2 - 1.0 mg/dL    AST (SGOT) 23 15 - 37 U/L    ALT (SGPT) 36 12 - 78 U/L    Alk. phosphatase 81 45 - 117 U/L    Protein, total 6.8 6.4 - 8.2 g/dL    Albumin 2.5 (L) 3.5 - 5.0 g/dL    Globulin 4.3 (H) 2.0 - 4.0 g/dL    A-G Ratio 0.6 (L) 1.1 - 2.2     CBC WITH AUTOMATED DIFF    Collection Time: 07/26/22  5:24 AM   Result Value Ref Range    WBC 11.5 (H) 4.1 - 11.1 K/uL    RBC 3.43 (L) 4.10 - 5.70 M/uL    HGB 10.3 (L) 12.1 - 17.0 g/dL    HCT 32.1 (L) 36.6 - 50.3 %    MCV 93.6 80.0 - 99.0 FL    MCH 30.0 26.0 - 34.0 PG    MCHC 32.1 30.0 - 36.5 g/dL    RDW 12.9 11.5 - 14.5 %    PLATELET 153 (H) 305 - 400 K/uL    MPV 10.5 8.9 - 12.9 FL    NRBC 0.0 0.0  WBC    ABSOLUTE NRBC 0.00 0.00 - 0.01 K/uL    NEUTROPHILS 74 32 - 75 %    LYMPHOCYTES 15 12 - 49 %    MONOCYTES 8 5 - 13 %    EOSINOPHILS 2 0 - 7 %    BASOPHILS 0 0 - 1 %    IMMATURE GRANULOCYTES 1 (H) 0 - 0.5 %    ABS. NEUTROPHILS 8.6 (H) 1.8 - 8.0 K/UL    ABS. LYMPHOCYTES 1.7 0.8 - 3.5 K/UL    ABS. MONOCYTES 0.9 0.0 - 1.0 K/UL    ABS. EOSINOPHILS 0.2 0.0 - 0.4 K/UL    ABS. BASOPHILS 0.0 0.0 - 0.1 K/UL    ABS. IMM. GRANS. 0.1 (H) 0.00 - 0.04 K/UL    DF AUTOMATED     GLUCOSE, POC    Collection Time: 07/26/22  8:08 AM   Result Value Ref Range    Glucose (POC) 81 65 - 117 mg/dL    Performed by Kb Alissa        XR Results (most recent):  Results from Hospital Encounter encounter on 07/21/22    XR CHEST PORT    Narrative  EXAM:  XR CHEST PORT    INDICATION: Shortness of breath    COMPARISON: Chest radiograph 7/11/2022    TECHNIQUE: Semiupright portable chest AP view    FINDINGS:    Left pectoral ICD. Cardiomediastinal silhouette is stably widened. Pulmonary  vasculature congestion and mild edema pattern. No focal consolidation. Pleural  spaces are not well evaluated, but are grossly clear. Impression  Mild pulmonary edema pattern. .         CT ABD PELV WO CONT   Final Result      1. Bilateral nonobstructing renal stones. No hydronephrosis   2. Equivocal nodularity of the liver to suggest cirrhosis           Review of Systems:  Constitutional: Negative for chills and fever. HENT: Negative. Eyes: Negative. Respiratory: Negative. Cardiovascular: Negative. Gastrointestinal: Negative for abdominal pain and nausea. Skin: Negative. Neurological: Negative. MSK: Lower back pain radiating down Rt leg. Objective:   VITALS:    Visit Vitals  /79 (BP 1 Location: Right upper arm, BP Patient Position: At rest)   Pulse 93   Temp 98.1 °F (36.7 °C)   Resp 19   Ht 5' 11\" (1.803 m)   Wt 157.4 kg (347 lb)   SpO2 99%   BMI 48.40 kg/m²       Physical Exam:   Constitutional: Obese pt is oriented to person, place, and time. HENT:   Head: Normocephalic and atraumatic. Eyes: Pupils are equal, round, and reactive to light. EOM are normal.   Cardiovascular: Normal rate, regular rhythm and normal heart sounds. Pulmonary/Chest: Breath sounds normal. No wheezes. No rales. Exhibits no tenderness. Abdominal: Soft. Bowel sounds are normal. There is no abdominal tenderness. There is no rebound and no guarding. Musculoskeletal: Pain to palpation of Rt flank. Pain with Rt straight leg raise. Neurological: pt is alert and oriented to person, place, and time. Alert. Normal strength. No cranial nerve deficit or sensory deficit. Displays a negative Romberg sign. Peripheral Vascular: [employer] swelling on ankles and legs. Hemosiderin staining bilaterally. Small venus stasis ulcer to the right medial malleolus. Large venus stasis ulcer to left lateral lower leg.       ASSESSMENT:  UTI  morbid obesity  DM  HTN  CHF  COPD  Gout  Sleep apnea  Chronic venous stasis ulcers  L5 discectomy    PLAN:  Vitamin C 1g PO BID  Aspirin 81mg PO every day  Bumex 2mg PO BID  Rocephin 1g IV every day  Colchicine 0.6mg PO every day  Ferrous sulfate 325mg PO BID  Flonase 50mcg both nostrils BID  Neurontin 300mg PO Q6h  Sliding scale insulin  Tamsulosin 0.8mg PO every day  Tizanidine 2mg PO TID  VitB 1 capsule PO every day  Vit E acetate 700 units PO BID  Oxycodone PRN    Polyethylene Glycol 17g dissolved in 240mL water QD      Current Facility-Administered Medications:     oxyCODONE IR (ROXICODONE) tablet 5 mg, 5 mg, Oral, Q6H PRN, Moni Patel MD, 5 mg at 07/26/22 5565    vitamin B complex capsule 1 Capsule, 1 Capsule, Oral, DAILY, Moni Patel MD, 1 Capsule at 07/26/22 0949    tiZANidine (ZANAFLEX) tablet 2 mg, 2 mg, Oral, TID, Jd Palacio MD, 2 mg at 07/26/22 0945    vitamin E acetate capsule 800 Units, 800 Units, Oral, BID, Moni Patel MD, 800 Units at 07/26/22 0839    albuterol (PROVENTIL HFA, VENTOLIN HFA, PROAIR HFA) inhaler 1 Puff, 1 Puff, Inhalation, Q4H PRN, Moni Patel MD    ascorbic acid (vitamin C) (VITAMIN C) tablet 1,000 mg, 1,000 mg, Oral, BID, Moni Patel MD, 1,000 mg at 07/26/22 1150    aspirin chewable tablet 81 mg, 81 mg, Oral, DAILY, Moni Patel MD, 81 mg at 07/26/22 5976    bumetanide (BUMEX) tablet 2 mg, 2 mg, Oral, BID, Moni Patel MD, 2 mg at 07/26/22 8358    colchicine tablet 0.6 mg, 0.6 mg, Oral, DAILY, Moni Patel MD, 0.6 mg at 07/26/22 1550    ferrous sulfate tablet 325 mg, 1 Tablet, Oral, BIDPC, Moni Patel MD, 325 mg at 07/26/22 0839    fluticasone propionate (FLONASE) 50 mcg/actuation nasal spray 2 Spray, 2 Spray, Both Nostrils, BID, Moni Patel MD, 2 [place] at 07/26/22 0945    gabapentin (NEURONTIN) capsule 300 mg, 300 mg, Oral, QID, Moni Patel MD, 300 mg at 07/26/22 0839    . PHARMACY TO SUBSTITUTE PER PROTOCOL (Reordered from: insulin glargine,hum.rec.anlog (TOUJEO MAX U-300 SOLOSTAR SC)), , , Per Protocol, Dominga Patel MD    tamsulosin (FLOMAX) capsule 0.8 mg, 0.8 mg, Oral, DAILY, Moni Patel MD, 0.8 mg at 07/26/22 0839    insulin lispro (HUMALOG) injection, , SubCUTAneous, AC&HS, Moni Patel MD    glucose chewable tablet 16 g, 4 Tablet, Oral, PRN, Amanda, Jean Marie Maddox MD    glucagon [facility]) injection 1 mg, 1 mg, IntraMUSCular, PRN, Jean Marie Patel MD    acetaminophen (TYLENOL) tablet 650 mg, 650 mg, Oral, Q6H PRN, 650 mg at 07/26/22 0839 **OR** acetaminophen (TYLENOL) suppository 650 mg, 650 mg, Rectal, Q6H PRN, Moni Patel MD    polyethylene glycol (MIRALAX) packet 17 g, 17 g, Oral, DAILY PRN, Moni Patel MD    ondansetron (ZOFRAN ODT) tablet 4 mg, 4 mg, Oral, Q8H PRN **OR** ondansetron (ZOFRAN) injection 4 mg, 4 mg, IntraVENous, Q6H PRN, Moni Patel MD    cefTRIAXone (ROCEPHIN) 1 g in sterile water (preservative free) 10 mL IV syringe, 1 g, IntraVENous, Q24H, Moni Patel MD      ________________________________________________________________________    Signed: Sandee Castellanos

## 2022-07-26 NOTE — WOUND CARE
IP WOUND CONSULT    [address] RECORD NUMBER:  [id number]  AGE: 78 y.o. GENDER: male  : 1955  TODAY'S DATE:  2022    GENERAL     [] Follow-up   [x] New Consult    Ab Parisi is a 78 y.o. male referred by:   [x] Physician  [] Nursing  [] Other:         PAST MEDICAL HISTORY    Past Medical History:   Diagnosis Date    Arthritis     CAD (coronary artery disease)     Chronic obstructive pulmonary disease ([facility] [address]     Diabetes ([facility] [address]     Gout     Hypertension     Ill-defined condition     Sleep apnea         PAST SURGICAL HISTORY    Past Surgical History:   Procedure Laterality Date    HX ORTHOPAEDIC      HX PACEMAKER      HX VEIN ABLATION ADHESIVE         FAMILY HISTORY    Family History   Problem Relation Age of Onset    Heart Disease Mother     Kidney Disease Mother     Hypertension Mother     Diabetes Mother     Heart Disease Father     Hypertension Father     Diabetes Sister     Diabetes Brother          ALLERGIES    Allergies   Allergen Reactions    Elavil Angioedema    Naproxen Unknown (comments)     Pt not sure of reaction    Sulfa (Sulfonamide Antibiotics) Nausea and Vomiting       MEDICATIONS    No current facility-administered medications on file prior to encounter. Current Outpatient Medications on File Prior to Encounter   Medication Sig Dispense Refill    vitamin E (AQUA GEMS) 400 unit capsule Take 800 Units by mouth two (2) times a day. insulin glargine,hum.rec.anlog (TOUJEO MAX U-300 SOLOSTAR SC) 80 Units by SubCUTAneous route two (2) times a day. 80 units in am, 34 units in pm      b complex vitamins tablet Take 1 Tablet by mouth daily. ascorbic acid, vitamin C, (VITAMIN C) 500 mg tablet Take 1,000 mg by mouth two (2) times a day. Indications: inadequate vitamin C      tamsulosin (FLOMAX) 0.4 mg capsule Take 0.8 mg by mouth daily. tiotropium (SPIRIVA) 18 mcg inhalation capsule Take 1 Capsule by inhalation daily.       bumetanide (BUMEX) 2 mg tablet Take 2 mg by mouth two (2) times a day. ferrous sulfate (Iron) 325 mg (65 mg iron) tablet Take  by mouth two (2) times daily (after meals). aspirin 81 mg chewable tablet Take 81 mg by mouth daily. flunisolide (NASAREL) 25 mcg (0.025 %) spry 2 Sprays two (2) times a day. albuterol (PROVENTIL HFA, VENTOLIN HFA, PROAIR HFA) 90 mcg/actuation inhaler Take 1 Puff by inhalation every four (4) hours as needed for Wheezing or Shortness of Breath. colchicine 0.6 mg tablet Take 0.6 mg by mouth daily. gabapentin (NEURONTIN) 300 mg capsule Take 300 mg by mouth four (4) times daily. oxyCODONE IR (ROXICODONE) 5 mg immediate release tablet Take 5 mg by mouth every twelve (12) hours. insulin lispro (HUMALOG) 100 unit/mL injection by SubCUTAneous route. Sliding scale      tiZANidine (ZANAFLEX) 2 mg tablet Take 1 Tablet by mouth three (3) times daily for 5 days. 15 Tablet 0    ammonium lactate (AMLACTIN) 12 % topical cream Apply  to affected area as needed for Other (xerosis). rub in to affected area well  Indications: dry skin 280 g 0    vitamin E, dl,tocopheryl acet, (vitamin E acetate) 400 unit capsule Take 2 Capsules by mouth two (2) times a day.  100 Capsule 0         [email or web address] Vitals  /79 (BP 1 Location: Right upper arm, BP Patient Position: At rest)   Pulse 93   Temp 98.1 °F (36.7 °C)   Resp 19   Ht 5' 11\" (1.803 m)   Wt 157.4 kg (347 lb)   SpO2 99%   BMI 48.40 kg/m²       ASSESSMENT     Wound Identification & Type: Venous stasis to BLEs and right medial heel  Dressing change: Yes, see flow chart  Verbal consent for picture: Yes    Contributing Factors: anticoagulation therapy, edema, diabetes, decreased mobility, shear force, incontinence of stool, incontinence of urine, and obesity    Wound Leg lower Left;Lateral #1 (Active)   Wound Image   07/26/22 1000   Wound Etiology Venous 07/26/22 1000   Dressing Status New dressing applied 07/26/22 1000   Cleansed Other (Comment) 07/26/22 1000   Dressing/Treatment Alginate with Ag;ABD pad; Ace wrap;Roll gauze; Moisturizing cream 07/26/22 1000   Dressing Change Due 07/28/22 07/26/22 1000   Wound Length (cm) 10.6 cm 07/26/22 1000   Wound Width (cm) 6.3 cm 07/26/22 1000   Wound Depth (cm) 0.2 cm 07/26/22 1000   Wound Surface Area (cm^2) 66.78 cm^2 07/26/22 1000   Change in Wound Size % 39.29 07/26/22 1000   Wound Volume (cm^3) 13.356 cm^3 07/26/22 1000   Wound Healing % -21 07/26/22 1000   Wound Assessment Subcutaneous;Pink/red 07/26/22 1000   Drainage Amount Scant 07/26/22 1000   Drainage Description Serosanguinous 07/26/22 1000   Wound Odor None 07/26/22 1000   Alissa-Wound/Incision Assessment Dry/flaky; Hypopigmented 07/26/22 1000   Edges Defined edges 07/26/22 1000   Wound Thickness Description Full thickness 07/26/22 1000   Number of days: 146       Wound Heel Right #2 (Active)   Wound Image   07/26/22 1014   Wound Etiology Venous 07/26/22 1014   Dressing Status New dressing applied 07/26/22 1014   Cleansed Other (Comment) 07/26/22 1014   Dressing/Treatment Alginate with Ag;Gauze dressing/dressing sponge; Ace wrap;Moisturizing cream;Roll gauze 07/26/22 1014   Dressing Change Due 07/28/22 07/26/22 1014   Wound Length (cm) 1 cm 07/26/22 1014   Wound Width (cm) 1.1 cm 07/26/22 1014   Wound Depth (cm) 0.1 cm 07/26/22 1014   Wound Surface Area (cm^2) 1.1 cm^2 07/26/22 1014   Change in Wound Size % 74.83 07/26/22 1014   Wound Volume (cm^3) 0.11 cm^3 07/26/22 1014   Wound Healing % 75 07/26/22 1014   Wound Assessment Pink/red 07/26/22 1014   Drainage Amount None 07/26/22 1014   Wound Odor None 07/26/22 1014   Alissa-Wound/Incision Assessment Dry/flaky 07/26/22 1014   Edges Undefined edges 07/26/22 1014   Wound Thickness Description Partial thickness 07/26/22 1014   Number of days: 125       Wound Pretibial Right 07/26/22 (Active)   Wound Image   07/26/22 1012   Wound Etiology Venous 07/26/22 1012   Dressing Status New dressing applied 07/26/22 1012 Cleansed Other (Comment) 07/26/22 1012   Dressing/Treatment ABD pad; Ace wrap;Alginate with Ag;Roll gauze; Moisturizing cream 07/26/22 1012   Dressing Change Due 07/28/22 07/26/22 1012   Wound Assessment Dry;Pink/red 07/26/22 1012   Drainage Amount None 07/26/22 1012   Wound Odor None 07/26/22 1012   Alissa-Wound/Incision Assessment Dry/flaky 07/26/22 1012   Edges Undefined edges 07/26/22 1012   Wound Thickness Description Partial thickness 07/26/22 1012   Number of days: 0       Wound Pretibial Left 07/26/22 (Active)   Number of days: 0          PLAN     Skin Care & Pressure Relief Recommendations  Speciality bed [facility] Smart + Bed Max  Minimize layers of linen  Turn/reposition approximately every 2 hours  Pillow wedges  Manage incontinence   Promote continence; Skin Protective lotion/cream to buttocks and sacrum daily and as needed with incontinence care  Offload heels pillows    Kumar 16  Blood Glucose: 131 on 7/26/22                             Albumin: 2.5 on 7/26/22  WBCs: 11.5 on 7/26/22     Support Surface: will transfer patient onto a [facility] Smart + Bed Pro for low air loss, microclimate control, and turn assist.  Patient declined Total Care Bariatric bed at this time stating his feet could not touch the floor when sitting on side of bed during previous admission. Physician/Provider notified: Dr. Edmund Caballero  Recommendations: Venous stasis ulcers to BLEs and right medial heel, well-healing. 2+ pitting pedal edema. Elevate BLEs with 2-3 pillows each. Patient is managed by Dr. Ketty Arcos at the [facility]. Apply Opticell AG and wrap with ACE wraps every two days and prn for soiling. Patient is not ambulatory at this time due to back pain that radiates to right leg so we will use the ACE wraps for gentle compression at this time. Toenails noted to be jagged posing risk for injury. Toenails thick and discolored, possibly onychomycosis.   Consulted entered for Dr. Dylan Roy.  Dr. Jose White notified and confirmed consult via [employer]. Apply Optifoam Border Sacral foam dressing every three days to sacrum to redistribute pressure from bony prominence and prevent pressure and friction injury to skin, see dressing order. Use bariatric body alignment wedge to turn patient q2h at 30 degree angle to offload sacrum to prevent pressure related skin injury, if not contraindicated. Do not position directly on greater trochanter. Maintain the PrimoFit to manage  incontinence. Float heels with 2-3 pillows while in bed for offloading of the heels. Maintain HOB at 30 degrees or less, if not contraindicated, to reduce pressure to buttocks and sacrum. Raise foot of bed to help prevent friction and shear injury from sliding down in the bed. Discharge Wound Care Needs: follow up with Dr. Dylan Harding at the [address] [place] [address]. Resume wound care orders prescribed by Dr. Dylan Harding.        Teaching completed with:   [] Patient           [] Family member       [] Caregiver          [] Nursing  [] Other    Patient/Caregiver Teaching:  Level of patient/caregiver understanding able to:   [] Indicates understanding       [] Needs reinforcement  [] Unsuccessful      [] Verbal Understanding  [] Demonstrated understanding       [] No evidence of learning  [] Refused teaching         [] N/A       Electronically signed by Bang Spears RN on 7/26/2022 at 10:26 AM

## 2022-07-26 NOTE — H&P
History and Physical    NAME: Jeannette Nixon   :  1955   MRN:  [id number]     Date/Time:  2022 8:20 AM    Patient PCP: Jacquelin Urena MD  ______________________________________________________________________             Subjective:     CHIEF COMPLAINT: UTI/back pain         HISTORY OF PRESENT ILLNESS:       Patient is a 78y.o. year old male with a significant PMH of morbid obesity, DM, HTN, CHF, COPD, gout, sleep apnea chronic venous stasis ulcers more in the left leg than right CAD, hx of L5 discectomy and recent MAURICIO presented to the ED for low back pain for the past three weeks. The patient says that the pain is in his lower back and radiates to his right flank and down his left leg. The pain is aggravated by movement of the right leg and he rates it as a 10/10. The pain improves when he lays still although he still has occasional shooting pain. Patient has current constipation. Patient denies chest pain, cough, SOB, changes in vision.     WBC 13.5k  BNP 1465  CRP 10.6  Albumin 2.5  >100 WBCs on UA w/ LE    Past Medical History:   Diagnosis Date    Arthritis     CAD (coronary artery disease)     Chronic obstructive pulmonary disease (HCC)     Diabetes (HCC)     Gout     Hypertension     Ill-defined condition     Sleep apnea         Past Surgical History:   Procedure Laterality Date    HX ORTHOPAEDIC      HX PACEMAKER      HX VEIN ABLATION ADHESIVE         Social History     Tobacco Use    Smoking status: Never    Smokeless tobacco: Never   Substance Use Topics    Alcohol use: Not Currently        Family History   Problem Relation Age of Onset    Heart Disease Mother     Kidney Disease Mother     Hypertension Mother     Diabetes Mother     Heart Disease Father     Hypertension Father     Diabetes Sister     Diabetes Brother        Allergies   Allergen Reactions    Elavil Angioedema    Naproxen Unknown (comments)     Pt not sure of reaction    Sulfa (Sulfonamide Antibiotics) Nausea and Vomiting Prior to Admission medications    Medication Sig Start Date End Date Taking? Authorizing Provider   vitamin E (AQUA GEMS) 400 unit capsule Take 800 Units by mouth two (2) times a day. Yes Provider, Historical   insulin glargine,hum.rec.anlog (TOUJEO MAX U-300 SOLOSTAR SC) 80 Units by SubCUTAneous route two (2) times a day. 80 units in am, 34 units in pm   Yes Provider, Historical   b complex vitamins tablet Take 1 Tablet by mouth daily. Yes Provider, Historical   ascorbic acid, vitamin C, (VITAMIN C) 500 mg tablet Take 1,000 mg by mouth two (2) times a day. Indications: inadequate vitamin C   Yes Provider, Historical   tamsulosin (FLOMAX) 0.4 mg capsule Take 0.8 mg by mouth daily. Yes Provider, Historical   tiotropium (SPIRIVA) 18 mcg inhalation capsule Take 1 Capsule by inhalation daily. Yes Provider, Historical   bumetanide (BUMEX) 2 mg tablet Take 2 mg by mouth two (2) times a day. Yes Provider, Historical   ferrous sulfate (Iron) 325 mg (65 mg iron) tablet Take  by mouth two (2) times daily (after meals). Yes Provider, Historical   aspirin 81 mg chewable tablet Take 81 mg by mouth daily. Yes Provider, Historical   flunisolide (NASAREL) 25 mcg (0.025 %) spry 2 Sprays two (2) times a day. Yes Provider, Historical   albuterol (PROVENTIL HFA, VENTOLIN HFA, PROAIR HFA) 90 mcg/actuation inhaler Take 1 Puff by inhalation every four (4) hours as needed for Wheezing or Shortness of Breath. Yes Provider, Historical   colchicine 0.6 mg tablet Take 0.6 mg by mouth daily. Yes Provider, Historical   gabapentin (NEURONTIN) 300 mg capsule Take 300 mg by mouth four (4) times daily. Yes Provider, Historical   oxyCODONE IR (ROXICODONE) 5 mg immediate release tablet Take 5 mg by mouth every twelve (12) hours. Yes Provider, Historical   insulin lispro (HUMALOG) 100 unit/mL injection by SubCUTAneous route.  Sliding scale   Yes Provider, Historical   tiZANidine (ZANAFLEX) 2 mg tablet Take 1 Tablet by mouth three (3) times daily for 5 days. 7/21/22 7/26/22  Rupali Walden MD   ammonium lactate (AMLACTIN) 12 % topical cream Apply  to affected area as needed for Other (xerosis). rub in to affected area well  Indications: dry skin 6/8/21   Wali Patel MD   vitamin E, dl,tocopheryl acet, (vitamin E acetate) 400 unit capsule Take 2 Capsules by mouth two (2) times a day.  6/8/21   Wali Patel MD         Current Facility-Administered Medications:     oxyCODONE IR (ROXICODONE) tablet 5 mg, 5 mg, Oral, Q6H PRN, Moni Patel MD, 5 mg at 07/26/22 0760    vitamin B complex capsule 1 Capsule, 1 Capsule, Oral, DAILY, Moni Patel MD, 1 Capsule at 07/26/22 0949    insulin glargine (LANTUS) injection 27 Units, 27 Units, SubCUTAneous, QHS, Moni Patel MD    tiZANidine (ZANAFLEX) tablet 2 mg, 2 mg, Oral, TID, Hope Garcia MD, 2 mg at 07/26/22 0945    vitamin E acetate capsule 800 Units, 800 Units, Oral, BID, Moni Patel MD, 800 Units at 07/26/22 0839    albuterol (PROVENTIL HFA, VENTOLIN HFA, PROAIR HFA) inhaler 1 Puff, 1 Puff, Inhalation, Q4H PRN, Moni Patel MD    ascorbic acid (vitamin C) (VITAMIN C) tablet 1,000 mg, 1,000 mg, Oral, BID, Moni Patel MD, 1,000 mg at 07/26/22 8710    aspirin chewable tablet 81 mg, 81 mg, Oral, DAILY, Moni Patel MD, 81 mg at 07/26/22 4964    bumetanide (BUMEX) tablet 2 mg, 2 mg, Oral, BID, Moni Patel MD, 2 mg at 07/26/22 4209    colchicine tablet 0.6 mg, 0.6 mg, Oral, DAILY, Moni Patel MD, 0.6 mg at 07/26/22 8944    ferrous sulfate tablet 325 mg, 1 Tablet, Oral, BIDPC, Moni Patel MD, 325 mg at 07/26/22 0839    fluticasone propionate (FLONASE) 50 mcg/actuation nasal spray 2 Spray, 2 Spray, Both Nostrils, BID, Moni Patel MD, 2 [place] at 07/26/22 0945    gabapentin (NEURONTIN) capsule 300 mg, 300 mg, Oral, QID, Moni Patel MD, 300 mg at 07/26/22 1229    [START ON 7/27/2022] insulin glargine (LANTUS) injection 64 Units, 64 Units, SubCUTAneous, DAILY, Moni Patel MD    tamsulosin (FLOMAX) capsule 0.8 mg, 0.8 mg, Oral, DAILY, Moni Patel MD, 0.8 mg at 07/26/22 8302    insulin lispro (HUMALOG) injection, , SubCUTAneous, AC&HS, Kishor Patel MD    glucose chewable tablet 16 g, 4 Tablet, Oral, PRN, Moni Patel MD    glucagon (GLUCAGEN) injection 1 mg, 1 mg, IntraMUSCular, PRN, Moni Patel MD    acetaminophen (TYLENOL) tablet 650 mg, 650 mg, Oral, Q6H PRN, 650 mg at 07/26/22 0839 **OR** acetaminophen (TYLENOL) suppository 650 mg, 650 mg, Rectal, Q6H PRN, Moni Patel MD    polyethylene glycol (MIRALAX) packet 17 g, 17 g, Oral, DAILY PRN, Moni Patel MD    ondansetron (ZOFRAN ODT) tablet 4 mg, 4 mg, Oral, Q8H PRN **OR** ondansetron (ZOFRAN) injection 4 mg, 4 mg, IntraVENous, Q6H PRN, Moni Patel MD    cefTRIAXone (ROCEPHIN) 1 g in sterile water (preservative free) 10 mL IV syringe, 1 g, IntraVENous, Q24H, Moni Patel MD    LAB DATA REVIEWED:    Recent Results (from the past 24 hour(s))   CBC WITH AUTOMATED DIFF    Collection Time: 07/25/22  7:33 PM   Result Value Ref Range    WBC 13.5 (H) 4.1 - 11.1 K/uL    RBC 3.43 (L) 4.10 - 5.70 M/uL    HGB 10.3 (L) 12.1 - 17.0 g/dL    HCT 32.2 (L) 36.6 - 50.3 %    MCV 93.9 80.0 - 99.0 FL    MCH 30.0 26.0 - 34.0 PG    MCHC 32.0 30.0 - 36.5 g/dL    RDW 12.9 11.5 - 14.5 %    PLATELET 168 (H) 399 - 400 K/uL    MPV 10.0 8.9 - 12.9 FL    NRBC 0.0 0.0  WBC    ABSOLUTE NRBC 0.00 0.00 - 0.01 K/uL    NEUTROPHILS 81 (H) 32 - 75 %    LYMPHOCYTES 12 12 - 49 %    MONOCYTES 5 5 - 13 %    EOSINOPHILS 1 0 - 7 %    BASOPHILS 0 0 - 1 %    IMMATURE GRANULOCYTES 1 (H) 0 - 0.5 %    ABS. NEUTROPHILS 10.9 (H) 1.8 - 8.0 K/UL    ABS. LYMPHOCYTES 1.7 0.8 - 3.5 K/UL    ABS. MONOCYTES 0.7 0.0 - 1.0 K/UL    ABS. EOSINOPHILS 0.2 0.0 - 0.4 K/UL    ABS. BASOPHILS 0.0 0.0 - 0.1 K/UL    ABS. IMM.  GRANS. 0.1 (H) 0.00 - 0.04 K/UL    DF AUTOMATED     METABOLIC PANEL, COMPREHENSIVE    Collection Time: 07/25/22  7:33 PM   Result Value Ref Range    Sodium 135 (L) 136 - 145 mmol/L    Potassium 3.8 3.5 - 5.1 mmol/L    Chloride 101 97 - 108 mmol/L    CO2 27 21 - 32 mmol/L    Anion gap 7 5 - 15 mmol/L    Glucose 154 (H) 65 - 100 mg/dL    BUN 28 (H) 6 - 20 mg/dL    Creatinine 1.55 (H) 0.70 - 1.30 mg/dL    BUN/Creatinine ratio 18 12 - 20      GFR est AA 54 (L) >60 ml/min/1.73m2    GFR est non-AA 45 (L) >60 ml/min/1.73m2    Calcium 8.9 8.5 - 10.1 mg/dL    Bilirubin, total 0.3 0.2 - 1.0 mg/dL    AST (SGOT) 25 15 - 37 U/L    ALT (SGPT) 39 12 - 78 U/L    Alk.  phosphatase 83 45 - 117 U/L    Protein, total 8.1 6.4 - 8.2 g/dL    Albumin 2.5 (L) 3.5 - 5.0 g/dL    Globulin 5.6 (H) 2.0 - 4.0 g/dL    A-G Ratio 0.4 (L) 1.1 - 2.2     SED RATE (ESR)    Collection Time: 07/25/22  7:33 PM   Result Value Ref Range    Sed rate, automated 129 (H) 0 - 20 mm/hr   C REACTIVE PROTEIN, QT    Collection Time: 07/25/22  7:33 PM   Result Value Ref Range    C-Reactive protein 10.60 (H) 0.00 - 0.60 mg/dL   NT-PRO BNP    Collection Time: 07/25/22  7:33 PM   Result Value Ref Range    NT pro-BNP 1,465 (H) <125 pg/mL   URINALYSIS W/ REFLEX CULTURE    Collection Time: 07/25/22  7:33 PM    Specimen: Urine   Result Value Ref Range    Color Yellow/Straw      Appearance Turbid (A) Clear      Specific gravity 1.015 1.003 - 1.030      pH (UA) 5.0 5.0 - 8.0      Protein Negative Negative mg/dL    Glucose >300 (A) Negative mg/dL    Ketone Negative Negative mg/dL    Bilirubin Negative Negative      Blood Negative Negative      Urobilinogen 0.1 0.1 - 1.0 EU/dL    Nitrites Negative Negative      Leukocyte Esterase Large (A) Negative      WBC >100 (H) 0 - 4 /hpf    RBC 5-10 0 - 5 /hpf    Bacteria Negative Negative /hpf    UA:UC IF INDICATED Urine Culture Ordered (A) Culture not indicated by UA result     METABOLIC PANEL, COMPREHENSIVE    Collection Time: 07/26/22  5:24 AM   Result Value Ref Range    Sodium 139 136 - 145 mmol/L Potassium 4.0 3.5 - 5.1 mmol/L    Chloride 106 97 - 108 mmol/L    CO2 27 21 - 32 mmol/L    Anion gap 6 5 - 15 mmol/L    Glucose 131 (H) 65 - 100 mg/dL    BUN 22 (H) 6 - 20 mg/dL    Creatinine 1.20 0.70 - 1.30 mg/dL    BUN/Creatinine ratio 18 12 - 20      GFR est AA >60 >60 ml/min/1.73m2    GFR est non-AA >60 >60 ml/min/1.73m2    Calcium 8.6 8.5 - 10.1 mg/dL    Bilirubin, total 0.3 0.2 - 1.0 mg/dL    AST (SGOT) 23 15 - 37 U/L    ALT (SGPT) 36 12 - 78 U/L    Alk. phosphatase 81 45 - 117 U/L    Protein, total 6.8 6.4 - 8.2 g/dL    Albumin 2.5 (L) 3.5 - 5.0 g/dL    Globulin 4.3 (H) 2.0 - 4.0 g/dL    A-G Ratio 0.6 (L) 1.1 - 2.2     CBC WITH AUTOMATED DIFF    Collection Time: 07/26/22  5:24 AM   Result Value Ref Range    WBC 11.5 (H) 4.1 - 11.1 K/uL    RBC 3.43 (L) 4.10 - 5.70 M/uL    HGB 10.3 (L) 12.1 - 17.0 g/dL    HCT 32.1 (L) 36.6 - 50.3 %    MCV 93.6 80.0 - 99.0 FL    MCH 30.0 26.0 - 34.0 PG    MCHC 32.1 30.0 - 36.5 g/dL    RDW 12.9 11.5 - 14.5 %    PLATELET 164 (H) 797 - 400 K/uL    MPV 10.5 8.9 - 12.9 FL    NRBC 0.0 0.0  WBC    ABSOLUTE NRBC 0.00 0.00 - 0.01 K/uL    NEUTROPHILS 74 32 - 75 %    LYMPHOCYTES 15 12 - 49 %    MONOCYTES 8 5 - 13 %    EOSINOPHILS 2 0 - 7 %    BASOPHILS 0 0 - 1 %    IMMATURE GRANULOCYTES 1 (H) 0 - 0.5 %    ABS. NEUTROPHILS 8.6 (H) 1.8 - 8.0 K/UL    ABS. LYMPHOCYTES 1.7 0.8 - 3.5 K/UL    ABS. MONOCYTES 0.9 0.0 - 1.0 K/UL    ABS. EOSINOPHILS 0.2 0.0 - 0.4 K/UL    ABS. BASOPHILS 0.0 0.0 - 0.1 K/UL    ABS. IMM.  GRANS. 0.1 (H) 0.00 - 0.04 K/UL    DF AUTOMATED     GLUCOSE, POC    Collection Time: 07/26/22  8:08 AM   Result Value Ref Range    Glucose (POC) 81 65 - 117 mg/dL    Performed by [address] [place] [address],[address], POC    Collection Time: 07/26/22 11:59 AM   Result Value Ref Range    Glucose (POC) 109 65 - 117 mg/dL    Performed by [facility]        XR Results (most recent):  [facility]    XR CHEST PORT    Narrative  EXAM:  XR CHEST PORT    INDICATION: Shortness of breath    COMPARISON: Chest radiograph 7/11/2022    TECHNIQUE: Semiupright portable chest AP view    FINDINGS:    Left pectoral ICD. Cardiomediastinal silhouette is stably widened. Pulmonary  vasculature congestion and mild edema pattern. No focal consolidation. Pleural  spaces are not well evaluated, but are grossly clear. Impression  Mild pulmonary edema pattern. .         CT ABD PELV WO CONT   Final Result      1. Bilateral nonobstructing renal stones. No hydronephrosis   2. Equivocal nodularity of the liver to suggest cirrhosis           Review of Systems:  Constitutional: Negative for chills and fever. HENT: Negative. Eyes: Negative. Respiratory: Negative. Cardiovascular: Negative. Gastrointestinal: Negative for abdominal pain and nausea. Skin: Negative. Neurological: Negative. MSK: Lower back pain radiating down Rt leg. Objective:   VITALS:    Visit Vitals  /79 (BP 1 Location: Right upper arm, BP Patient Position: At rest)   Pulse 93   Temp 98.1 °F (36.7 °C)   Resp 19   Ht 5' 11\" (1.803 m)   Wt 157.4 kg (347 lb)   SpO2 99%   BMI 48.40 kg/m²       Physical Exam:   Constitutional: Obese pt is oriented to person, place, and time. HENT:   Head: Normocephalic and atraumatic. Eyes: Pupils are equal, round, and reactive to light. EOM are normal.   Cardiovascular: Normal rate, regular rhythm and normal heart sounds. Pulmonary/Chest: Breath sounds normal. No wheezes. No rales. Exhibits no tenderness. Abdominal: Soft. Bowel sounds are normal. There is no abdominal tenderness. There is no rebound and no guarding. Musculoskeletal: Pain to palpation of Rt flank. Pain with Rt straight leg raise. Neurological: pt is alert and oriented to person, place, and time. Alert. Normal strength. No cranial nerve deficit or sensory deficit. Displays a negative Romberg sign. Peripheral Vascular: [place] swelling on ankles and legs.  Hemosiderin staining bilaterally. Small venus stasis ulcer to the right medial malleolus. Large venus stasis ulcer to left lateral lower leg.       ASSESSMENT:  UTI  Back pain  morbid obesity  DM  HTN  CHF  COPD  Gout  Sleep apnea  Chronic venous stasis ulcers  L5 discectomy    PLAN:  Vitamin C 1g PO BID  Aspirin 81mg PO every day  Bumex 2mg PO BID  Rocephin 1g IV every day  Colchicine 0.6mg PO every day  Ferrous sulfate 325mg PO BID  Flonase 50mcg both nostrils BID  Neurontin 300mg PO Q6h  Sliding scale insulin  Tamsulosin 0.8mg PO every day  Tizanidine 2mg PO TID  VitB 1 capsule PO every day  Vit E acetate 700 units PO BID  Oxycodone PRN    Polyethylene Glycol 17g dissolved in 240mL water every day  Sorbitol 30 cc now  Follow-up with the orthopedics  PT OT consult      Current Facility-Administered Medications:     oxyCODONE IR (ROXICODONE) tablet 5 mg, 5 mg, Oral, Q6H PRN, Moni Patel MD, 5 mg at 07/26/22 5535    vitamin B complex capsule 1 Capsule, 1 Capsule, Oral, DAILY, Moni Patel MD, 1 Capsule at 07/26/22 0949    insulin glargine (LANTUS) injection 27 Units, 27 Units, SubCUTAneous, QHS, Moni Patel MD    tiZANidine (ZANAFLEX) tablet 2 mg, 2 mg, Oral, TID, Moni Patel MD, 2 mg at 07/26/22 0945    vitamin E acetate capsule 800 Units, 800 Units, Oral, BID, Moni Patel MD, 800 Units at 07/26/22 0839    albuterol (PROVENTIL HFA, VENTOLIN HFA, PROAIR HFA) inhaler 1 Puff, 1 Puff, Inhalation, Q4H PRN, Moni Patel MD    ascorbic acid (vitamin C) (VITAMIN C) tablet 1,000 mg, 1,000 mg, Oral, BID, Moni Patel MD, 1,000 mg at 07/26/22 2837    aspirin chewable tablet 81 mg, 81 mg, Oral, DAILY, Moni Patel MD, 81 mg at 07/26/22 2057    bumetanide (BUMEX) tablet 2 mg, 2 mg, Oral, BID, Moni Patel MD, 2 mg at 07/26/22 0693    colchicine tablet 0.6 mg, 0.6 mg, Oral, DAILY, Moni Patel MD, 0.6 mg at 07/26/22 0839    ferrous sulfate tablet 325 mg, 1 Tablet, Oral, BIDPC, Constantin Patel, MD, 325 mg at 07/26/22 0839    fluticasone propionate (FLONASE) 50 mcg/actuation nasal spray 2 Spray, 2 Spray, Both Nostrils, BID, Moni Patel MD, 2 [place] at 07/26/22 0945    gabapentin (NEURONTIN) capsule 300 mg, 300 mg, Oral, QID, Nils Patel MD, 300 mg at 07/26/22 1229    [START ON 7/27/2022] insulin glargine (LANTUS) injection 64 Units, 64 Units, SubCUTAneous, DAILY, Moni Patel MD    tamsulosin (FLOMAX) capsule 0.8 mg, 0.8 mg, Oral, DAILY, Moni Patel MD, 0.8 mg at 07/26/22 5554    insulin lispro (HUMALOG) injection, , SubCUTAneous, AC&HS, Nils Patel MD    glucose chewable tablet 16 g, 4 Tablet, Oral, PRN, Nils Patel MD    glucagon (GLUCAGEN) injection 1 mg, 1 mg, IntraMUSCular, PRN, Nils Patel MD    acetaminophen (TYLENOL) tablet 650 mg, 650 mg, Oral, Q6H PRN, 650 mg at 07/26/22 0839 **OR** acetaminophen (TYLENOL) suppository 650 mg, 650 mg, Rectal, Q6H PRN, Moni Patel MD    polyethylene glycol (MIRALAX) packet 17 g, 17 g, Oral, DAILY PRN, Moni Patel MD    ondansetron (ZOFRAN ODT) tablet 4 mg, 4 mg, Oral, Q8H PRN **OR** ondansetron (ZOFRAN) injection 4 mg, 4 mg, IntraVENous, Q6H PRN, Moni Patel MD    cefTRIAXone (ROCEPHIN) 1 g in sterile water (preservative free) 10 mL IV syringe, 1 g, IntraVENous, Q24H, Nils Patel MD      ________________________________________________________________________    Signed: Abdiel Story MD

## 2022-07-26 NOTE — CONSULTS
ORTHOPEDIC CONSULT    Patient: Gabriel Frank MRN: [id number]  SSN: [id number]    YOB: 1955  Age: 78 y.o. Sex: male      Subjective:      Gabriel Frank is a 78 y.o. male who is being seen in orthopedic consultation for right-sided low back pain and right lower extremity pain. The patient states he has a long history of chronic low back pain. He states over the last 2 days has had increasing right-sided low back pain radiating to his right leg. He denies any new instances of numbness or tingling of his lower extremities but states he does have a history of peripheral neuropathy which is unchanged at this time. He states he does have a history of urinary stress incontinence which is unchanged as well. He denies any bowel incontinence. The patient denies any recent falls or injuries. Patient states the pain starts in his right buttock region but is now has progressed into his right lateral thigh and right knee. The pain is exacerbated with any movement of his right lower extremity. The patient is status post L5 discectomy back in 1988. He denies any other musculoskeletal complaints at this time.     Past Medical History:   Diagnosis Date    Arthritis     CAD (coronary artery disease)     Chronic obstructive pulmonary disease ([facility]     Diabetes ([facility]     Gout     Hypertension     Ill-defined condition     Sleep apnea      Past Surgical History:   Procedure Laterality Date    HX ORTHOPAEDIC      HX PACEMAKER      HX VEIN ABLATION ADHESIVE        Family History   Problem Relation Age of Onset    Heart Disease Mother     Kidney Disease Mother     Hypertension Mother     Diabetes Mother     Heart Disease Father     Hypertension Father     Diabetes Sister     Diabetes Brother      Social History     Tobacco Use    Smoking status: Never    Smokeless tobacco: Never   Substance Use Topics    Alcohol use: Not Currently      Prior to Admission medications    Medication Sig Start Date End Date Taking? Authorizing Provider   vitamin E (AQUA GEMS) 400 unit capsule Take 800 Units by mouth two (2) times a day. Yes Provider, Historical   insulin glargine,hum.rec.anlog (TOUJEO MAX U-300 SOLOSTAR SC) 80 Units by SubCUTAneous route two (2) times a day. 80 units in am, 34 units in pm   Yes Provider, Historical   b complex vitamins tablet Take 1 Tablet by mouth daily. Yes Provider, Historical   ascorbic acid, vitamin C, (VITAMIN C) 500 mg tablet Take 1,000 mg by mouth two (2) times a day. Indications: inadequate vitamin C   Yes Provider, Historical   tamsulosin (FLOMAX) 0.4 mg capsule Take 0.8 mg by mouth daily. Yes Provider, Historical   tiotropium (SPIRIVA) 18 mcg inhalation capsule Take 1 Capsule by inhalation daily. Yes Provider, Historical   bumetanide (BUMEX) 2 mg tablet Take 2 mg by mouth two (2) times a day. Yes Provider, Historical   ferrous sulfate (Iron) 325 mg (65 mg iron) tablet Take  by mouth two (2) times daily (after meals). Yes Provider, Historical   aspirin 81 mg chewable tablet Take 81 mg by mouth daily. Yes Provider, Historical   flunisolide (NASAREL) 25 mcg (0.025 %) spry 2 Sprays two (2) times a day. Yes Provider, Historical   albuterol (PROVENTIL HFA, VENTOLIN HFA, PROAIR HFA) 90 mcg/actuation inhaler Take 1 Puff by inhalation every four (4) hours as needed for Wheezing or Shortness of Breath. Yes Provider, Historical   colchicine 0.6 mg tablet Take 0.6 mg by mouth daily. Yes Provider, Historical   gabapentin (NEURONTIN) 300 mg capsule Take 300 mg by mouth four (4) times daily. Yes Provider, Historical   oxyCODONE IR (ROXICODONE) 5 mg immediate release tablet Take 5 mg by mouth every twelve (12) hours. Yes Provider, Historical   insulin lispro (HUMALOG) 100 unit/mL injection by SubCUTAneous route. Sliding scale   Yes Provider, Historical   tiZANidine (ZANAFLEX) 2 mg tablet Take 1 Tablet by mouth three (3) times daily for 5 days.  7/21/22 7/26/22  Gwen Marinelli MD   ammonium lactate (AMLACTIN) 12 % topical cream Apply  to affected area as needed for Other (xerosis). rub in to affected area well  Indications: dry skin 6/8/21   Madalyn Patel MD   vitamin E, dl,tocopheryl acet, (vitamin E acetate) 400 unit capsule Take 2 Capsules by mouth two (2) times a day. 6/8/21   Madalyn Patel MD       Allergies   Allergen Reactions    Elavil Angioedema    Naproxen Unknown (comments)     Pt not sure of reaction    Sulfa (Sulfonamide Antibiotics) Nausea and Vomiting       Review of Systems:  Review of Systems   Constitutional: Negative. HENT: Negative. Eyes: Negative. Respiratory: Negative. Cardiovascular: Negative. Gastrointestinal: Negative. Genitourinary:  Positive for frequency. Musculoskeletal:  Positive for back pain and joint pain. Right lower extremity and right knee pain   Skin:  Positive for rash. Right lower extremity   Neurological: Negative. Endo/Heme/Allergies: Negative. Psychiatric/Behavioral: Negative.          Objective:     Current Facility-Administered Medications   Medication Dose Route Frequency    oxyCODONE IR (ROXICODONE) tablet 5 mg  5 mg Oral Q6H PRN    vitamin B complex capsule 1 Capsule  1 Capsule Oral DAILY    insulin glargine (LANTUS) injection 27 Units  27 Units SubCUTAneous QHS    tiZANidine (ZANAFLEX) tablet 2 mg  2 mg Oral TID    vitamin E acetate capsule 800 Units  800 Units Oral BID    albuterol (PROVENTIL HFA, VENTOLIN HFA, PROAIR HFA) inhaler 1 Puff  1 Puff Inhalation Q4H PRN    ascorbic acid (vitamin C) (VITAMIN C) tablet 1,000 mg  1,000 mg Oral BID    aspirin chewable tablet 81 mg  81 mg Oral DAILY    bumetanide (BUMEX) tablet 2 mg  2 mg Oral BID    colchicine tablet 0.6 mg  0.6 mg Oral DAILY    ferrous sulfate tablet 325 mg  1 Tablet Oral BIDPC    fluticasone propionate (FLONASE) 50 mcg/actuation nasal spray 2 Spray  2 Spray Both Nostrils BID    gabapentin (NEURONTIN) capsule 300 mg  300 mg Oral QID [START ON 7/27/2022] insulin glargine (LANTUS) injection 64 Units  64 Units SubCUTAneous DAILY    tamsulosin (FLOMAX) capsule 0.8 mg  0.8 mg Oral DAILY    insulin lispro (HUMALOG) injection   SubCUTAneous AC&HS    glucose chewable tablet 16 g  4 Tablet Oral PRN    glucagon (GLUCAGEN) injection 1 mg  1 mg IntraMUSCular PRN    acetaminophen (TYLENOL) tablet 650 mg  650 mg Oral Q6H PRN    Or    acetaminophen (TYLENOL) suppository 650 mg  650 mg Rectal Q6H PRN    polyethylene glycol (MIRALAX) packet 17 g  17 g Oral DAILY PRN    ondansetron (ZOFRAN ODT) tablet 4 mg  4 mg Oral Q8H PRN    Or    ondansetron (ZOFRAN) injection 4 mg  4 mg IntraVENous Q6H PRN    cefTRIAXone (ROCEPHIN) 1 g in sterile water (preservative free) 10 mL IV syringe  1 g IntraVENous Q24H      Vitals:    07/26/22 0208 07/26/22 0735 07/26/22 0747 07/26/22 1532   BP: 123/74 137/79  139/74   Pulse: 94 93  79   Resp: 18 19  20   Temp: 98.5 °F (36.9 °C) 98.1 °F (36.7 °C)  97.8 °F (36.6 °C)   SpO2:  98% 99% 98%   Weight:       Height:            Alert and oriented x3, No apparent distress  Morbidly obese    Physical Exam:  Back: Limited exam: There is mild tenderness palpation to the right sciatic notch and right-sided L5 paravertebral region. Lower extremities: Venous stasis is present bilateral lower extremities right being worse than left. Sensation to sharp dull touch remains intact throughout right lower extremity. There was decreased sensation to sharp dull touch of the toes of his bilateral feet right being worse than left. DP pulses are faint but palpable bilaterally. No calf pain to palpation. Mild swelling of his right knee compared to left. No erythema ecchymosis seen. Active range of motion of his right knee 0 to 50 degrees limited secondary to pain. Passive range of motion 0 to 60 degrees again limited secondary to pain. There was tenderness but no laxity to varus valgus stressing.   There was tenderness palpation of the medial joint compartment. There was tenderness palpation in the popliteal region. Negative drawer test.  Unable to perform Trae's test patient cannot tolerate. Right lower extremity appears neurovascularly intact. Labs:  CBC:  Recent Labs     07/26/22 0524 07/25/22 1933   WBC 11.5* 13.5*   RBC 3.43* 3.43*   HGB 10.3* 10.3*   HCT 32.1* 32.2*   MCV 93.6 93.9   RDW 12.9 12.9   * 436*     CHEMISTRIES:  Recent Labs     07/26/22 0524 07/25/22 1933    135*   K 4.0 3.8    101   CO2 27 27   BUN 22* 28*   CREA 1.20 1.55*   CA 8.6 8.9   PT/INR:No results for input(s): INR, INREXT in the last 72 hours. No lab exists for component: PROTIME  APTT:No results for input(s): APTT in the last 72 hours. LIVER PROFILE:  Recent Labs     07/26/22 0524 07/25/22 1933   AST 23 25   ALT 36 39       IMAGING:  No diagnostic radiographic studies available for his back or right knee    Dopplers taken of his lower extremity were negative for acute venous thrombosis and shows competent venous flow with augmentation. Assessment/Plan:     Hospital Problems  Date Reviewed: 7/20/2022            Codes Class Noted POA    UTI (urinary tract infection) ICD-10-CM: N39.0  ICD-9-CM: 599.0  7/25/2022 Unknown        Back pain ICD-10-CM: M54.9  ICD-9-CM: 724.5  7/25/2022 Unknown       Probable sciatica degenerative disc disease lumbar spine  Probably of degenerative disease right knee    I will order a CT scan of his lumbar spine. Patient have MRI compatible pacemaker placement. Will order plain radiographs of his right knee. Orthopedics will follow up once imaging studies are completed. This patient was examined direct consult with Dr. Kyle Lozano    Thank you for the courtesy of this consult.     Signed By: Nahomi Bach PA-C     July 26, 2022

## 2022-07-26 NOTE — ED NOTES
TRANSFER - OUT REPORT:    Verbal report given to [facility]) on Lulu Flores  being transferred to 5E(unit) for routine progression of care       Report consisted of patients Situation, Background, Assessment and   Recommendations(SBAR). Information from the following report(s) SBAR, ED Summary, MAR, and Recent Results was reviewed with the receiving nurse. Lines:   Peripheral IV 07/25/22 Left Antecubital (Active)        Opportunity for questions and clarification was provided.       Patient transported with:   O2 @ 3 liters  Tech

## 2022-07-26 NOTE — CONSULTS
Infectious Disease Consult Note    Reason for Consult:  Leg ulcers, back pain suspected discitis   Date of Consultation: July 26, 2022  Date of Admission: 7/25/2022  Referring Physician: Dr Giuliana Orantes     HPI: 78 y.o WM presenting w c/o 10/10 back and RLE pain, ID consulted due to concerns of discitis and UTI. His medical history is sig for chronic venous stasis involving b/l LE, CAD, chronic back pain, DM, sleep apnea, and chronic pain syndrome. He reported severe back pain during my assessment, stated he received Dilaudid in the ED which helped, oral pain meds not helping. He is afebrile w a leukocytosis of 11.5 on todays labs. UA (07/25) shows pyuria, Cx is pending. Blood Cx from 07/26 is pending. Alcaligenes faecalis was isolated from leg wound on 07/12/22. Venous doppler of b/l LE (07/21) was neg for DVT. CT abdo/pel 07/21 revealed B/l nonobstructing renal stones. CT lumbar spine and X-ray of right knee has been ordered, yet to be done. He is on Ceftriaxone. Requesting pain mgt.      Review of Systems:     Gen: Negative for chills, fevers, weight loss, weight gain   CV:  Negative for chest pain, dyspnea on exertion, leg edema   Lungs: Negative for shortness of breath, cough, wheezing   Abdomen: Negative for abdominal pain, nausea, vomiting, diarrhea, constipation   Genitourinary: Negative for genital pain or genital discharge     Neuro: Negative for headache, numbness, tingling, extremity weakness   Skin: chronic leg ulcers    Musculoskeletal: Back and leg pain    Psych: Negative for manic behavior       Past Medical History:  Past Medical History:   Diagnosis Date    Arthritis     CAD (coronary artery disease)     Chronic obstructive pulmonary disease ([facility] [address]     Diabetes ([facility] [address]     Gout     Hypertension     Ill-defined condition     Sleep apnea        Past surgical history   Past Surgical History:   Procedure Laterality Date    HX ORTHOPAEDIC      HX PACEMAKER      HX VEIN ABLATION ADHESIVE          Social History   Social History     Tobacco Use    Smoking status: Never    Smokeless tobacco: Never   Vaping Use    Vaping Use: Never used   Substance Use Topics    Alcohol use: Not Currently    Drug use: Never        Family history   Family History   Problem Relation Age of Onset    Heart Disease Mother     Kidney Disease Mother     Hypertension Mother     Diabetes Mother     Heart Disease Father     Hypertension Father     Diabetes Sister     Diabetes Brother           Allergies: Allergies   Allergen Reactions    Elavil Angioedema    Naproxen Unknown (comments)     Pt not sure of reaction    Sulfa (Sulfonamide Antibiotics) Nausea and Vomiting         Medications:  No current facility-administered medications on file prior to encounter. Current Outpatient Medications on File Prior to Encounter   Medication Sig Dispense Refill    vitamin E (AQUA GEMS) 400 unit capsule Take 800 Units by mouth two (2) times a day. insulin glargine,hum.rec.anlog (TOUJEO MAX U-300 SOLOSTAR SC) 80 Units by SubCUTAneous route two (2) times a day. 80 units in am, 34 units in pm      b complex vitamins tablet Take 1 Tablet by mouth daily. ascorbic acid, vitamin C, (VITAMIN C) 500 mg tablet Take 1,000 mg by mouth two (2) times a day. Indications: inadequate vitamin C      tamsulosin (FLOMAX) 0.4 mg capsule Take 0.8 mg by mouth daily. tiotropium (SPIRIVA) 18 mcg inhalation capsule Take 1 Capsule by inhalation daily. bumetanide (BUMEX) 2 mg tablet Take 2 mg by mouth two (2) times a day. ferrous sulfate (Iron) 325 mg (65 mg iron) tablet Take  by mouth two (2) times daily (after meals). aspirin 81 mg chewable tablet Take 81 mg by mouth daily. flunisolide (NASAREL) 25 mcg (0.025 %) spry 2 Sprays two (2) times a day. albuterol (PROVENTIL HFA, VENTOLIN HFA, PROAIR HFA) 90 mcg/actuation inhaler Take 1 Puff by inhalation every four (4) hours as needed for Wheezing or Shortness of Breath.       colchicine 0.6 mg tablet Take 0.6 mg by mouth daily. gabapentin (NEURONTIN) 300 mg capsule Take 300 mg by mouth four (4) times daily. oxyCODONE IR (ROXICODONE) 5 mg immediate release tablet Take 5 mg by mouth every twelve (12) hours. insulin lispro (HUMALOG) 100 unit/mL injection by SubCUTAneous route. Sliding scale      tiZANidine (ZANAFLEX) 2 mg tablet Take 1 Tablet by mouth three (3) times daily for 5 days. 15 Tablet 0    ammonium lactate (AMLACTIN) 12 % topical cream Apply  to affected area as needed for Other (xerosis). rub in to affected area well  Indications: dry skin 280 g 0    vitamin E, dl,tocopheryl acet, (vitamin E acetate) 400 unit capsule Take 2 Capsules by mouth two (2) times a day.  100 Capsule 0           Physical Exam:    Vitals: Patient Vitals for the past 24 hrs:   Temp Pulse Resp BP SpO2   07/26/22 1532 97.8 °F (36.6 °C) 79 20 139/74 98 %   07/26/22 0747 -- -- -- -- 99 %   07/26/22 0735 98.1 °F (36.7 °C) 93 19 137/79 98 %   07/26/22 0208 98.5 °F (36.9 °C) 94 18 123/74 --   07/25/22 2354 97.9 °F (36.6 °C) 96 14 138/81 100 %   07/25/22 1900 -- -- -- -- 100 %     GEN: NAD, AAO x 4  HEENT: NCAT, PERRLA  HEART: S1, S2+, RRR, No murmur   Lungs: CTA B/l, decreased at the bases   Abdomen: soft, ND, NT, +BS   Genitourinary: no genital discharge, no ryan  Extremities: b/l leg dressings in place   Skin: chronic leg ulcers, not examined due to pressure dressings     Labs:   Recent Labs     07/26/22 0524 07/25/22 1933   WBC 11.5* 13.5*   HGB 10.3* 10.3*   HCT 32.1* 32.2*   * 436*     Recent Labs     07/26/22 0524 07/25/22 1933   BUN 22* 28*   CREA 1.20 1.55*       Lab Results   Component Value Date/Time    C-Reactive protein 10.60 (H) 07/25/2022 07:33 PM      No results found for: SR       Microbiology Data:  - Blood Cx 07/25: Pending   - Urine Cx 07/25: Pending     Imaging:   - CT of lumbar spine 07/27: Pending   - X-ray right knee 07/27: Pending     Assessment / Plan:     UTI, complicated by nephrolithiasis per CT abdo/pel on 07/21        Abnormal UA, Cx is pending, denies h/o recurrent UTI         D/c Ceftriaxone, broaden coverage w Zosyn pending final Cx         Routine labs in the morning       2. Chronic venous stasis involving b/l legs Wounds not examined today due to pressure dressings/pain       Alcaligenes faecalis isolated from wound Cx on 07/12 (unclear if R or L)       Should be covered by Zosyn. Venous doppler of b/l neg for DVT on 07/21    3. Back pain, ? Discitis, CT ordered, will follow     4. Right knee pain: Reason unclear, X-ray ordered, h/o gouty arthritis     5.  H/o chronic pain syndrome, requesting Dilaiudid, will defer mgt to primary     Urszula Peters MD     7/26/2022

## 2022-07-26 NOTE — PROGRESS NOTES
Reason for Readmission:  UTI (urinary tract infection), Back pain         RUR Score/Risk Level: 16%        PCP: First and Last name:  Teresita Pineda   Name of Practice:    Are you a current patient: Yes/No: yes   Approximate date of last visit: July 2022   Can you participate in a virtual visit with your PCP:     Is a Care Conference indicated:   No thank you at this time      Did you attend your follow up appointment (s): If not, why not: Follow up at wound care clinic         Resources/supports as identified by patient/family:          Top Challenges facing patient (as identified by patient/family and CM): Finances/Medication cost?   na     Transportation      family  Support system or lack thereof?  family     Living arrangements? With   family     Self-care/ADLs/Cognition? Family assist patient when needed          Current Advanced Directive/Advance Care Plan:  full code           Plan for utilizing home health:   open with [facility]              Transition of Care Plan:    Based on readmission, the patient's previous Plan of Care   has been evaluated and/or modified. The current Transition of Care Plan is:   patient return to hospital due to pain 10/10, being worked up for UTI. Discharge plan currently once medically stable is to return home with home health.

## 2022-07-27 ENCOUNTER — APPOINTMENT (OUTPATIENT)
Dept: CT IMAGING | Age: 67
DRG: 690 | End: 2022-07-27
Attending: PHYSICIAN ASSISTANT
Payer: MEDICARE

## 2022-07-27 ENCOUNTER — APPOINTMENT (OUTPATIENT)
Dept: GENERAL RADIOLOGY | Age: 67
DRG: 690 | End: 2022-07-27
Attending: FAMILY MEDICINE
Payer: MEDICARE

## 2022-07-27 LAB
ALBUMIN SERPL-MCNC: 2.6 G/DL (ref 3.5–5)
ALBUMIN/GLOB SERPL: 0.5 {RATIO} (ref 1.1–2.2)
ALP SERPL-CCNC: 83 U/L (ref 45–117)
ALT SERPL-CCNC: 37 U/L (ref 12–78)
ANION GAP SERPL CALC-SCNC: 6 MMOL/L (ref 5–15)
AST SERPL W P-5'-P-CCNC: 25 U/L (ref 15–37)
BACTERIA SPEC CULT: NORMAL
BILIRUB SERPL-MCNC: 0.4 MG/DL (ref 0.2–1)
BUN SERPL-MCNC: 19 MG/DL (ref 6–20)
BUN/CREAT SERPL: 15 (ref 12–20)
CA-I BLD-MCNC: 8.9 MG/DL (ref 8.5–10.1)
CHLORIDE SERPL-SCNC: 106 MMOL/L (ref 97–108)
CO2 SERPL-SCNC: 28 MMOL/L (ref 21–32)
COLONY COUNT,CNT: NORMAL
COLONY COUNT,CNT: NORMAL
CREAT SERPL-MCNC: 1.23 MG/DL (ref 0.7–1.3)
ERYTHROCYTE [DISTWIDTH] IN BLOOD BY AUTOMATED COUNT: 12.8 % (ref 11.5–14.5)
GLOBULIN SER CALC-MCNC: 4.8 G/DL (ref 2–4)
GLUCOSE BLD STRIP.AUTO-MCNC: 105 MG/DL (ref 65–117)
GLUCOSE BLD STRIP.AUTO-MCNC: 108 MG/DL (ref 65–117)
GLUCOSE BLD STRIP.AUTO-MCNC: 51 MG/DL (ref 65–117)
GLUCOSE BLD STRIP.AUTO-MCNC: 61 MG/DL (ref 65–117)
GLUCOSE BLD STRIP.AUTO-MCNC: 80 MG/DL (ref 65–117)
GLUCOSE SERPL-MCNC: 67 MG/DL (ref 65–100)
HCT VFR BLD AUTO: 35.8 % (ref 36.6–50.3)
HGB BLD-MCNC: 11.2 G/DL (ref 12.1–17)
MCH RBC QN AUTO: 29.4 PG (ref 26–34)
MCHC RBC AUTO-ENTMCNC: 31.3 G/DL (ref 30–36.5)
MCV RBC AUTO: 94 FL (ref 80–99)
NRBC # BLD: 0 K/UL (ref 0–0.01)
NRBC BLD-RTO: 0 PER 100 WBC
PERFORMED BY, TECHID: ABNORMAL
PERFORMED BY, TECHID: ABNORMAL
PERFORMED BY, TECHID: NORMAL
PLATELET # BLD AUTO: 457 K/UL (ref 150–400)
PMV BLD AUTO: 10.2 FL (ref 8.9–12.9)
POTASSIUM SERPL-SCNC: 4.1 MMOL/L (ref 3.5–5.1)
PROT SERPL-MCNC: 7.4 G/DL (ref 6.4–8.2)
RBC # BLD AUTO: 3.81 M/UL (ref 4.1–5.7)
SODIUM SERPL-SCNC: 140 MMOL/L (ref 136–145)
SPECIAL REQUESTS,SREQ: NORMAL
WBC # BLD AUTO: 9.1 K/UL (ref 4.1–11.1)

## 2022-07-27 PROCEDURE — 73522 X-RAY EXAM HIPS BI 3-4 VIEWS: CPT

## 2022-07-27 PROCEDURE — 65270000029 HC RM PRIVATE

## 2022-07-27 PROCEDURE — 99232 SBSQ HOSP IP/OBS MODERATE 35: CPT | Performed by: INTERNAL MEDICINE

## 2022-07-27 PROCEDURE — 74011636637 HC RX REV CODE- 636/637: Performed by: FAMILY MEDICINE

## 2022-07-27 PROCEDURE — 72131 CT LUMBAR SPINE W/O DYE: CPT

## 2022-07-27 PROCEDURE — 74011250636 HC RX REV CODE- 250/636: Performed by: INTERNAL MEDICINE

## 2022-07-27 PROCEDURE — 74011250636 HC RX REV CODE- 250/636: Performed by: PHYSICIAN ASSISTANT

## 2022-07-27 PROCEDURE — 77010033678 HC OXYGEN DAILY

## 2022-07-27 PROCEDURE — 36415 COLL VENOUS BLD VENIPUNCTURE: CPT

## 2022-07-27 PROCEDURE — 74011250637 HC RX REV CODE- 250/637: Performed by: PHYSICIAN ASSISTANT

## 2022-07-27 PROCEDURE — 82962 GLUCOSE BLOOD TEST: CPT

## 2022-07-27 PROCEDURE — 74011000258 HC RX REV CODE- 258: Performed by: INTERNAL MEDICINE

## 2022-07-27 PROCEDURE — 94761 N-INVAS EAR/PLS OXIMETRY MLT: CPT

## 2022-07-27 PROCEDURE — 85027 COMPLETE CBC AUTOMATED: CPT

## 2022-07-27 PROCEDURE — 74011250637 HC RX REV CODE- 250/637: Performed by: FAMILY MEDICINE

## 2022-07-27 PROCEDURE — 80053 COMPREHEN METABOLIC PANEL: CPT

## 2022-07-27 RX ADMIN — OXYCODONE HYDROCHLORIDE AND ACETAMINOPHEN 1000 MG: 500 TABLET ORAL at 09:49

## 2022-07-27 RX ADMIN — Medication 800 UNITS: at 22:03

## 2022-07-27 RX ADMIN — PIPERACILLIN AND TAZOBACTAM 3.38 G: 3; .375 INJECTION, POWDER, FOR SOLUTION INTRAVENOUS at 12:30

## 2022-07-27 RX ADMIN — TIZANIDINE 2 MG: 2 TABLET ORAL at 22:03

## 2022-07-27 RX ADMIN — GABAPENTIN 300 MG: 300 CAPSULE ORAL at 12:30

## 2022-07-27 RX ADMIN — TIZANIDINE 2 MG: 2 TABLET ORAL at 18:00

## 2022-07-27 RX ADMIN — FERROUS SULFATE TAB 325 MG (65 MG ELEMENTAL FE) 325 MG: 325 (65 FE) TAB at 18:00

## 2022-07-27 RX ADMIN — ASPIRIN 81 MG CHEWABLE TABLET 81 MG: 81 TABLET CHEWABLE at 09:49

## 2022-07-27 RX ADMIN — GABAPENTIN 300 MG: 300 CAPSULE ORAL at 18:00

## 2022-07-27 RX ADMIN — ACETAMINOPHEN 1000 MG: 500 TABLET ORAL at 12:30

## 2022-07-27 RX ADMIN — FERROUS SULFATE TAB 325 MG (65 MG ELEMENTAL FE) 325 MG: 325 (65 FE) TAB at 09:53

## 2022-07-27 RX ADMIN — OXYCODONE 10 MG: 5 TABLET ORAL at 22:22

## 2022-07-27 RX ADMIN — Medication 1 CAPSULE: at 09:53

## 2022-07-27 RX ADMIN — INSULIN GLARGINE 27 UNITS: 100 INJECTION, SOLUTION SUBCUTANEOUS at 22:13

## 2022-07-27 RX ADMIN — OXYCODONE HYDROCHLORIDE AND ACETAMINOPHEN 1000 MG: 500 TABLET ORAL at 22:03

## 2022-07-27 RX ADMIN — PIPERACILLIN AND TAZOBACTAM 3.38 G: 3; .375 INJECTION, POWDER, FOR SOLUTION INTRAVENOUS at 04:00

## 2022-07-27 RX ADMIN — ACETAMINOPHEN 1000 MG: 500 TABLET ORAL at 00:39

## 2022-07-27 RX ADMIN — TIZANIDINE 2 MG: 2 TABLET ORAL at 10:00

## 2022-07-27 RX ADMIN — INSULIN GLARGINE 32 UNITS: 100 INJECTION, SOLUTION SUBCUTANEOUS at 09:45

## 2022-07-27 RX ADMIN — ACETAMINOPHEN 1000 MG: 500 TABLET ORAL at 18:00

## 2022-07-27 RX ADMIN — OXYCODONE 10 MG: 5 TABLET ORAL at 10:00

## 2022-07-27 RX ADMIN — ACETAMINOPHEN 1000 MG: 500 TABLET ORAL at 23:46

## 2022-07-27 RX ADMIN — ACETAMINOPHEN 1000 MG: 500 TABLET ORAL at 06:05

## 2022-07-27 RX ADMIN — MORPHINE SULFATE 2 MG: 2 INJECTION, SOLUTION INTRAMUSCULAR; INTRAVENOUS at 06:05

## 2022-07-27 RX ADMIN — GABAPENTIN 300 MG: 300 CAPSULE ORAL at 09:49

## 2022-07-27 RX ADMIN — MORPHINE SULFATE 2 MG: 2 INJECTION, SOLUTION INTRAMUSCULAR; INTRAVENOUS at 19:27

## 2022-07-27 RX ADMIN — GABAPENTIN 300 MG: 300 CAPSULE ORAL at 22:03

## 2022-07-27 RX ADMIN — BUMETANIDE 2 MG: 1 TABLET ORAL at 09:53

## 2022-07-27 RX ADMIN — FLUTICASONE PROPIONATE 2 SPRAY: 50 SPRAY, METERED NASAL at 09:56

## 2022-07-27 RX ADMIN — PIPERACILLIN AND TAZOBACTAM 3.38 G: 3; .375 INJECTION, POWDER, FOR SOLUTION INTRAVENOUS at 22:02

## 2022-07-27 RX ADMIN — Medication 800 UNITS: at 09:53

## 2022-07-27 RX ADMIN — MORPHINE SULFATE 2 MG: 2 INJECTION, SOLUTION INTRAMUSCULAR; INTRAVENOUS at 12:25

## 2022-07-27 RX ADMIN — OXYCODONE 10 MG: 5 TABLET ORAL at 02:26

## 2022-07-27 RX ADMIN — TAMSULOSIN HYDROCHLORIDE 0.8 MG: 0.4 CAPSULE ORAL at 09:52

## 2022-07-27 RX ADMIN — BUMETANIDE 2 MG: 1 TABLET ORAL at 22:03

## 2022-07-27 RX ADMIN — COLCHICINE 0.6 MG: 0.6 TABLET, FILM COATED ORAL at 09:53

## 2022-07-27 NOTE — PROGRESS NOTES
PROGRESS NOTE    Patient: Nga Mcclure MRN: [id number]  SSN: [id number]    YOB: 1955  Age: 78 y.o. Sex: male      Admit Date: 7/25/2022    LOS: 2 days       Subjective     Chief Complaint   Patient presents with    Back Pain       HPI: Patient is a 78y.o. year old male with a significant PMH of morbid obesity, DM, HTN, CHF, COPD, gout, sleep apnea chronic venous stasis ulcers more in the left leg than right CAD, hx of L5 discectomy and recent MAURICIO presented to the ED for low back pain for the past three weeks. The patient says that the pain is in his lower back and radiates to his right flank and down his left leg. The pain is aggravated by movement of the right leg and he rates it as a 10/10. The pain improves when he lays still although he still has occasional shooting pain. Patient has current constipation. Patient denies chest pain, cough, SOB, changes in vision. WBC 13.5k  BNP 1465  CRP 10.6  Albumin 2.5  >100 WBCs on UA w/ LE    7/27  Patient laying in bed stating his condition has not improved. NAD  Blood glucose 80 this morning. Will recheck after breakfast.  Albumin 2.5  Knee x-ray: No acute fracture or dislocation  CT Lumbar spine ordered for today    Review of Systems:  Constitutional: Negative for chills and fever. HENT: Negative. Eyes: Negative. Respiratory: Negative. Cardiovascular: Negative. Gastrointestinal: Negative for abdominal pain and nausea. Skin: Negative. Neurological: Negative. MSK: Lower back pain radiating down Rt leg. Objective     Visit Vitals  /71 (BP 1 Location: Right upper arm, BP Patient Position: Supine;Semi fowlers)   Pulse 75   Temp 98 °F (36.7 °C)   Resp 16   Ht 5' 11\" (1.803 m)   Wt 157.4 kg (347 lb)   SpO2 100%   BMI 48.40 kg/m²    O2 Flow Rate (L/min): 3 l/min O2 Device: CPAP mask    Physical Exam:   Constitutional: Obese pt is oriented to person, place, and time. HENT:  Head: Normocephalic and atraumatic.   Eyes: Pupils are equal, round, and reactive to light. EOM are normal.  Cardiovascular: Normal rate, regular rhythm and normal heart sounds. Pulmonary/Chest: Breath sounds normal. No wheezes. No rales. Exhibits no tenderness. Abdominal: Soft. Bowel sounds are normal. There is no abdominal tenderness. There is no rebound and no guarding. Musculoskeletal: Pain to palpation of Rt flank. Pain with Rt straight leg raise. Neurological: pt is alert and oriented to person, place, and time. Alert. Normal strength. No cranial nerve deficit or sensory deficit. Displays a negative Romberg sign. Peripheral Vascular: Austine Pam swelling on ankles and legs. Hemosiderin staining bilaterally. Small venus stasis ulcer to the right medial malleolus. Large venus stasis ulcer to left lateral lower leg. Intake & Output:  Current Shift: No intake/output data recorded. Last three shifts: 07/25 1901 - 07/27 0700  In: 852.5 [P.O.:240; I.V.:612.5]  Out: 2650 [Urine:2650]    Lab/Data Review:  Lab results reviewed. For significant abnormal values and values requiring intervention, see assessment and plan. 24 Hour Results:    Recent Results (from the past 24 hour(s))   GLUCOSE, POC    Collection Time: 07/26/22 11:59 AM   Result Value Ref Range    Glucose (POC) 109 65 - 117 mg/dL    Performed by Rick Andujar    GLUCOSE, POC    Collection Time: 07/26/22  4:56 PM   Result Value Ref Range    Glucose (POC) 93 65 - 117 mg/dL    Performed by Joelene Homans    GLUCOSE, POC    Collection Time: 07/26/22  7:31 PM   Result Value Ref Range    Glucose (POC) 144 (H) 65 - 117 mg/dL    Performed by Nancy Ohara    GLUCOSE, POC    Collection Time: 07/27/22  7:30 AM   Result Value Ref Range    Glucose (POC) 80 65 - 117 mg/dL    Performed by Wali Ozuna          Imaging:    XR KNEE RT MAX 2 VWS   Final Result   No acute fracture or dislocation.  There is chronic appearing   cortical irregularity at the upper pole of the patella which may be related to   old quadriceps tendon injury. CT ABD PELV WO CONT   Final Result      1. Bilateral nonobstructing renal stones. No hydronephrosis   2.  Equivocal nodularity of the liver to suggest cirrhosis      CT SPINE LUMB WO CONT    (Results Pending)          Assessment     ASSESSMENT:  UTI  Back pain  morbid obesity  DM  HTN  CHF  COPD  Gout  Sleep apnea  Chronic venous stasis ulcers  L5 discectomy  Pacemaker (MRI compatible)  Protein-calorie malnutrition    Plan   Rocephin discontinued per ID  Zosyn 3.375g IV Q8h    Vitamin C 1g PO BID  Aspirin 81mg PO every day  Bumex 2mg PO BID  Colchicine 0.6mg PO every day  Ferrous sulfate 325mg PO BID  Flonase 50mcg both nostrils BID  Neurontin 300mg PO Q6h  Sliding scale insulin  Tamsulosin 0.8mg PO every day  Tizanidine 2mg PO TID  VitB 1 capsule PO every day  Vit E acetate 700 units PO BID  Oxycodone PRN     Polyethylene Glycol 17g dissolved in 240mL water every day  Sorbitol 30 cc now  Follow-up with the orthopedics  PT OT consult      Current Facility-Administered Medications:     vitamin B complex capsule 1 Capsule, 1 Capsule, Oral, DAILY, Moni Patel MD, 1 Capsule at 07/26/22 0949    insulin glargine (LANTUS) injection 27 Units, 27 Units, SubCUTAneous, QHS, Moni Patel MD, 27 Units at 07/26/22 2141    acetaminophen (TYLENOL) tablet 1,000 mg, 1,000 mg, Oral, Q6H, 1,000 mg at 07/27/22 0605 **OR** [DISCONTINUED] acetaminophen (TYLENOL) suppository 650 mg, 650 mg, Rectal, Q6H PRN, Moni Patel MD    oxyCODONE IR (ROXICODONE) tablet 5 mg, 5 mg, Oral, Q4H PRN, Julia Phipps PA-C    oxyCODONE IR (ROXICODONE) tablet 10 mg, 10 mg, Oral, Q4H PRN, Julia Phipps PA-C, 10 mg at 07/27/22 0226    morphine injection 2 mg, 2 mg, IntraVENous, Q6H PRN, Julia Phipps PA-C, 2 mg at 07/27/22 0605    piperacillin-tazobactam (ZOSYN) 3.375 g in 0.9% sodium chloride (MBP/ADV) 100 mL MBP, 3.375 g, IntraVENous, Q8H, Samson Ceja MD, Last Rate: 25 mL/hr at 07/27/22 0400, 3.375 g at 07/27/22 0400    tiZANidine (ZANAFLEX) tablet 2 mg, 2 mg, Oral, TID, Mandy Hand MD, 2 mg at 07/26/22 2245    vitamin E acetate capsule 800 Units, 800 Units, Oral, BID, Moni Patel MD, 800 Units at 07/26/22 2141    albuterol (PROVENTIL HFA, VENTOLIN HFA, PROAIR HFA) inhaler 1 Puff, 1 Puff, Inhalation, Q4H PRN, Moni Patel MD    ascorbic acid (vitamin C) (VITAMIN C) tablet 1,000 mg, 1,000 mg, Oral, BID, Moni Patel MD, 1,000 mg at 07/26/22 2141    aspirin chewable tablet 81 mg, 81 mg, Oral, DAILY, Moni Patel MD, 81 mg at 07/26/22 8475    bumetanide (BUMEX) tablet 2 mg, 2 mg, Oral, BID, Moni Patel MD, 2 mg at 07/26/22 2141    colchicine tablet 0.6 mg, 0.6 mg, Oral, DAILY, Moni Patel MD, 0.6 mg at 07/26/22 3067    ferrous sulfate tablet 325 mg, 1 Tablet, Oral, BIDPC, Moni Patel MD, 325 mg at 07/26/22 1724    fluticasone propionate (FLONASE) 50 mcg/actuation nasal spray 2 Spray, 2 Spray, Both Nostrils, BID, Moni Patel MD, 2 [place] at 07/26/22 2147    gabapentin (NEURONTIN) capsule 300 mg, 300 mg, Oral, QID, Moni Patel MD, 300 mg at 07/26/22 2141    insulin glargine (LANTUS) injection 64 Units, 64 Units, SubCUTAneous, DAILY, Moni Patel MD    tamsulosin (FLOMAX) capsule 0.8 mg, 0.8 mg, Oral, DAILY, Moni Patel MD, 0.8 mg at 07/26/22 4871    insulin lispro (HUMALOG) injection, , SubCUTAneous, AC&HS, AmandaShayne motta MD    glucose chewable tablet 16 g, 4 Tablet, Oral, PRN, Shayne Patel MD    glucagon (GLUCAGEN) injection 1 mg, 1 mg, IntraMUSCular, PRN, oMni Patel MD    polyethylene glycol (MIRALAX) packet 17 g, 17 g, Oral, DAILY PRN, Moni Patel MD    ondansetron (ZOFRAN ODT) tablet 4 mg, 4 mg, Oral, Q8H PRN **OR** ondansetron (ZOFRAN) injection 4 mg, 4 mg, IntraVENous, Q6H PRN, Moni Patel MD    Current Outpatient Medications   Medication Instructions    albuterol (PROVENTIL HFA, VENTOLIN HFA, PROAIR HFA) 90 mcg/actuation inhaler 1 Puff, Inhalation, EVERY 4 HOURS AS NEEDED    ammonium lactate (AMLACTIN) 12 % topical cream Topical, AS NEEDED, rub in to affected area well    ascorbic acid (vitamin C) (VITAMIN C) 1,000 mg, Oral, 2 TIMES DAILY    aspirin 81 mg, Oral, DAILY    b complex vitamins tablet 1 Tablet, Oral, DAILY    bumetanide (BUMEX) 2 mg, Oral, 2 TIMES DAILY    colchicine 0.6 mg, Oral, DAILY    ferrous sulfate (Iron) 325 mg (65 mg iron) tablet Oral, 2 TIMES DAILY AFTER MEALS    flunisolide (NASAREL) 25 mcg (0.025 %) spry 2 Sprays, 2 TIMES DAILY    gabapentin (NEURONTIN) 300 mg, Oral, 4 TIMES DAILY    insulin glargine,hum.rec.anlog (TOUJEO MAX U-300 SOLOSTAR SC) 80 Units, SubCUTAneous, 2 TIMES DAILY, 80 units in am, 34 units in pm     insulin lispro (HUMALOG) 100 unit/mL injection SubCUTAneous, Sliding scale     oxyCODONE IR (ROXICODONE) 5 mg, Oral, EVERY 12 HOURS    tamsulosin (FLOMAX) 0.8 mg, Oral, DAILY    tiotropium (SPIRIVA) 18 mcg inhalation capsule 1 Capsule, Inhalation, DAILY    vitamin E (AQUA GEMS) 800 Units, Oral, 2 TIMES DAILY    vitamin E acetate 800 Units, Oral, 2 TIMES DAILY         Signed By: Mario Flores     July 27, 2022

## 2022-07-27 NOTE — PROGRESS NOTES
Patient's blood sugar was 51 re-checked and it was 61 after receiving 32 units of Lantus and eating 100% of his breakfast. New order hold Lantus administer orange juice. Continue to monitor.

## 2022-07-27 NOTE — PROGRESS NOTES
Problem: Pressure Injury - Risk of  Goal: *Prevention of pressure injury  Description: Document Kumar Scale and appropriate interventions in the flowsheet. Outcome: Progressing Towards Goal  Note: Pressure Injury Interventions:       Moisture Interventions: Internal/External urinary devices, Minimize layers, Absorbent underpads    Activity Interventions: Assess need for specialty bed    Mobility Interventions: HOB 30 degrees or less    Nutrition Interventions: Document food/fluid/supplement intake    Friction and Shear Interventions: Apply protective barrier, creams and emollients, Minimize layers                Problem: Patient Education: Go to Patient Education Activity  Goal: Patient/Family Education  Outcome: Progressing Towards Goal     Problem: Falls - Risk of  Goal: *Absence of Falls  Description: Document Brennon Fall Risk and appropriate interventions in the flowsheet.   Outcome: Progressing Towards Goal  Note: Fall Risk Interventions:  Mobility Interventions: Bed/chair exit alarm         Medication Interventions: Bed/chair exit alarm    Elimination Interventions: Call light in reach, Bed/chair exit alarm              Problem: Patient Education: Go to Patient Education Activity  Goal: Patient/Family Education  Outcome: Progressing Towards Goal

## 2022-07-27 NOTE — PROGRESS NOTES
Infectious Disease Progress Note           Subjective:   Pt seen and examined at bedside. Reports ongoing back and leg pain, otherwise doing well clinically   Objective:   Physical Exam:     Visit Vitals  /74 (BP 1 Location: Right upper arm)   Pulse 79   Temp 97.8 °F (36.6 °C)   Resp 18   Ht 5' 11\" (1.803 m)   Wt 347 lb (157.4 kg)   SpO2 100%   BMI 48.40 kg/m²    O2 Flow Rate (L/min): 3 l/min O2 Device: Nasal cannula    Temp (24hrs), Av.9 °F (36.6 °C), Min:97.2 °F (36.2 °C), Max:98.8 °F (37.1 °C)    701 -  190  In: 1182 [P.O.:1028]  Out: 800 [Urine:800]   1901 -  0700  In: 852.5 [P.O.:240;  I.V.:612.5]  Out: 2650 [Urine:2650]    General: NAD, AAO x 4  HEENT: SURYA, Moist mucosa   Lungs: CTA b/l, decreased at the bases, no wheeze/rhonchi   Heart: S1S2+, RRR, no murmur  Abdo: Soft, NT, ND, +BS   : No ryan cath   Exts: No edema, + pulses b/l   Skin: No wounds, No rashes or lesions      Data Review:       Recent Days:  Recent Labs     22  0733 22  0524 22   WBC 9.1 11.5* 13.5*   HGB 11.2* 10.3* 10.3*   HCT 35.8* 32.1* 32.2*   * 433* 436*     Recent Labs     22  0733 22  0524 22  1933   BUN 19 22* 28*   CREA 1.23 1.20 1.55*       Lab Results   Component Value Date/Time    C-Reactive protein 10.60 (H) 2022 07:33 PM          Microbiology     Results       Procedure Component Value Units Date/Time    CULTURE, BLOOD, LINE [[id number]] Collected: 22 6829    Order Status: Sent Specimen: Blood Updated: 22    CULTURE, BLOOD, LINE [[id number]] Collected: 22    Order Status: Sent Specimen: Blood Updated: 22    CULTURE, URINE [[id number]] Collected: 22    Order Status: Canceled Specimen: Urine     CULTURE, URINE [[id number]] Collected: 22    Order Status: Completed Specimen: Urine Updated: 22 0637     Special Requests: --        No Special Requests  Reflexed from [id number]       [place] Count 80,000        [place] Count colonies/ml        Culture result:       Mixed urogenital lily isolated                     Diagnostics   CXR Results  (Last 48 hours)      None               Assessment/Plan     UTI, complicated by nephrolithiasis per CT abdo/pel on 07/21        Abnormal UA, mixed lily isolated from urine Cx         Afebrile, WBC normalized on todays labs         Continue on Zosyn. Will need stones addressed to prevent recurrent UTIs         Routine labs in the morning      2. Chronic venous stasis involving b/l legs Wounds      Alcaligenes faecalis isolated from wound Cx on 07/12 (unclear if R or L)      Continue on empiric zosyn for now      3. Back pain, reports ongoing back pain       DJD w no evidence of discitis, on CT of lumbar spine      4. Right knee pain: No acute findings noted on X-ray, old quadriceps tendon injury noted      5.  H/o chronic pain syndrome, mgt per primary     Dyan Stokes MD    7/27/2022

## 2022-07-27 NOTE — PROGRESS NOTES
Patient's blood sugar was 80 this am has scheduled Lantus of 64 units.   Dr. Dahiana Morton notiifed N.O. give 1/2 of Lantus now (32units) encourage patient to eat recheck blood sugar after he eats, then administer 1/2 of Lantus (32 units)

## 2022-07-27 NOTE — PROGRESS NOTES
PROGRESS NOTE    Patient: Misael Hector MRN: [id number]  SSN: [id number]    YOB: 1955  Age: 78 y.o. Sex: male      Admit Date: 7/25/2022    LOS: 2 days       Subjective     Chief Complaint   Patient presents with    Back Pain       HPI: Patient is a 78y.o. year old male with a significant PMH of morbid obesity, DM, HTN, CHF, COPD, gout, sleep apnea chronic venous stasis ulcers more in the left leg than right CAD, hx of L5 discectomy and recent MAURICIO presented to the ED for low back pain for the past three weeks. The patient says that the pain is in his lower back and radiates to his right flank and down his left leg. The pain is aggravated by movement of the right leg and he rates it as a 10/10. The pain improves when he lays still although he still has occasional shooting pain. Patient has current constipation. Patient denies chest pain, cough, SOB, changes in vision. WBC 13.5k  BNP 1465  CRP 10.6  Albumin 2.5  >100 WBCs on UA w/ LE    7/27  Patient laying in bed stating his condition has not improved. NAD  Blood glucose 80 this morning. Will recheck after breakfast.  Albumin 2.5  Knee x-ray: No acute fracture or dislocation  CT Lumbar spine ordered for today  Blood sugars running low  At home he take Lantus 80 units subcu daily    Review of Systems:  Constitutional: Negative for chills and fever. HENT: Negative. Eyes: Negative. Respiratory: Negative. Cardiovascular: Negative. Gastrointestinal: Negative for abdominal pain and nausea. Skin: Negative. Neurological: Negative. MSK: Lower back pain radiating down Rt leg.       Objective     Visit Vitals  /71 (BP 1 Location: Right upper arm, BP Patient Position: Supine;Semi fowlers)   Pulse 75   Temp 98 °F (36.7 °C)   Resp 16   Ht 5' 11\" (1.803 m)   Wt 157.4 kg (347 lb)   SpO2 100%   BMI 48.40 kg/m²    O2 Flow Rate (L/min): 3 l/min O2 Device: Nasal cannula    Physical Exam:   Constitutional: Obese pt is oriented to person, place, and time. HENT:  Head: Normocephalic and atraumatic. Eyes: Pupils are equal, round, and reactive to light. EOM are normal.  Cardiovascular: Normal rate, regular rhythm and normal heart sounds. Pulmonary/Chest: Breath sounds normal. No wheezes. No rales. Exhibits no tenderness. Abdominal: Soft. Bowel sounds are normal. There is no abdominal tenderness. There is no rebound and no guarding. Musculoskeletal: Pain to palpation of Rt flank. Pain with Rt straight leg raise. Neurological: pt is alert and oriented to person, place, and time. Alert. Normal strength. No cranial nerve deficit or sensory deficit. Displays a negative Romberg sign. Peripheral Vascular: [place] Rise swelling on ankles and legs. Hemosiderin staining bilaterally. Small venus stasis ulcer to the right medial malleolus. Large venus stasis ulcer to left lateral lower leg. Intake & Output:  Current Shift: 07/27 0701 - 07/27 1900  In: 240 [P.O.:240]  Out: -   Last three shifts: 07/25 1901 - 07/27 0700  In: 852.5 [P.O.:240; I.V.:612.5]  Out: 2650 [Urine:2650]    Lab/Data Review:  Lab results reviewed. For significant abnormal values and values requiring intervention, see assessment and plan.        24 Hour Results:    Recent Results (from the past 24 hour(s))   GLUCOSE, POC    Collection Time: 07/26/22 11:59 AM   Result Value Ref Range    Glucose (POC) 109 65 - 117 mg/dL    Performed by Dolores Ramirez    GLUCOSE, POC    Collection Time: 07/26/22  4:56 PM   Result Value Ref Range    Glucose (POC) 93 65 - 117 mg/dL    Performed by Yoselyn Tidwell    GLUCOSE, POC    Collection Time: 07/26/22  7:31 PM   Result Value Ref Range    Glucose (POC) 144 (H) 65 - 117 mg/dL    Performed by Elin Maldonado    GLUCOSE, POC    Collection Time: 07/27/22  7:30 AM   Result Value Ref Range    Glucose (POC) 80 65 - 117 mg/dL    Performed by Giorgio Andino    CBC W/O DIFF    Collection Time: 07/27/22  7:33 AM   Result Value Ref Range    WBC 9.1 4.1 - 11.1 K/uL    RBC 3.81 (L) 4.10 - 5.70 M/uL    HGB 11.2 (L) 12.1 - 17.0 g/dL    HCT 35.8 (L) 36.6 - 50.3 %    MCV 94.0 80.0 - 99.0 FL    MCH 29.4 26.0 - 34.0 PG    MCHC 31.3 30.0 - 36.5 g/dL    RDW 12.8 11.5 - 14.5 %    PLATELET 273 (H) 416 - 400 K/uL    MPV 10.2 8.9 - 12.9 FL    NRBC 0.0 0.0  WBC    ABSOLUTE NRBC 0.00 0.00 - 5.35 K/uL   METABOLIC PANEL, COMPREHENSIVE    Collection Time: 07/27/22  7:33 AM   Result Value Ref Range    Sodium 140 136 - 145 mmol/L    Potassium 4.1 3.5 - 5.1 mmol/L    Chloride 106 97 - 108 mmol/L    CO2 28 21 - 32 mmol/L    Anion gap 6 5 - 15 mmol/L    Glucose 67 65 - 100 mg/dL    BUN 19 6 - 20 mg/dL    Creatinine 1.23 0.70 - 1.30 mg/dL    BUN/Creatinine ratio 15 12 - 20      GFR est AA >60 >60 ml/min/1.73m2    GFR est non-AA 59 (L) >60 ml/min/1.73m2    Calcium 8.9 8.5 - 10.1 mg/dL    Bilirubin, total 0.4 0.2 - 1.0 mg/dL    AST (SGOT) 25 15 - 37 U/L    ALT (SGPT) 37 12 - 78 U/L    Alk. phosphatase 83 45 - 117 U/L    Protein, total 7.4 6.4 - 8.2 g/dL    Albumin 2.6 (L) 3.5 - 5.0 g/dL    Globulin 4.8 (H) 2.0 - 4.0 g/dL    A-G Ratio 0.5 (L) 1.1 - 2.2     GLUCOSE, POC    Collection Time: 07/27/22 11:31 AM   Result Value Ref Range    Glucose (POC) 51 (LL) 65 - 117 mg/dL    Performed by Benny Olivares    GLUCOSE, POC    Collection Time: 07/27/22 11:37 AM   Result Value Ref Range    Glucose (POC) 61 (L) 65 - 117 mg/dL    Performed by Benny Olivares          Imaging:    XR KNEE RT MAX 2 VWS   Final Result   No acute fracture or dislocation. There is chronic appearing   cortical irregularity at the upper pole of the patella which may be related to   old quadriceps tendon injury. CT ABD PELV WO CONT   Final Result      1. Bilateral nonobstructing renal stones. No hydronephrosis   2.  Equivocal nodularity of the liver to suggest cirrhosis      CT SPINE LUMB WO CONT    (Results Pending)          Assessment     ASSESSMENT:  UTI  Back pain  morbid obesity  DM  HTN  CHF  COPD  Gout  Sleep apnea  Chronic venous stasis ulcers  L5 discectomy  Pacemaker (MRI compatible)  Protein-calorie malnutrition    Plan   Rocephin discontinued per ID  Zosyn 3.375g IV Q8h    Vitamin C 1g PO BID  Aspirin 81mg PO every day  Bumex 2mg PO BID  Colchicine 0.6mg PO every day  Ferrous sulfate 325mg PO BID  Flonase 50mcg both nostrils BID  Neurontin 300mg PO Q6h  Sliding scale insulin  Tamsulosin 0.8mg PO every day  Tizanidine 2mg PO TID  VitB 1 capsule PO every day  Vit E acetate 700 units PO BID  Oxycodone PRN     Polyethylene Glycol 17g dissolved in 240mL water every day  Sorbitol 30 cc now  Follow-up with the orthopedics  PT OT consult  CT of the lumbar spine pending  Will order the x-ray of the hips bilateral  Hold Lantus      Current Facility-Administered Medications:     vitamin B complex capsule 1 Capsule, 1 Capsule, Oral, DAILY, Moni Patel MD, 1 Capsule at 07/27/22 0953    insulin glargine (LANTUS) injection 27 Units, 27 Units, SubCUTAneous, QHS, Moni Patel MD, 27 Units at 07/26/22 2141    acetaminophen (TYLENOL) tablet 1,000 mg, 1,000 mg, Oral, Q6H, 1,000 mg at 07/27/22 0605 **OR** [DISCONTINUED] acetaminophen (TYLENOL) suppository 650 mg, 650 mg, Rectal, Q6H PRN, Moni Patel MD    oxyCODONE IR (ROXICODONE) tablet 5 mg, 5 mg, Oral, Q4H PRN, Julia Phipps PA-C    oxyCODONE IR (ROXICODONE) tablet 10 mg, 10 mg, Oral, Q4H PRN, Julia Phipps PA-C, 10 mg at 07/27/22 1000    morphine injection 2 mg, 2 mg, IntraVENous, Q6H PRN, Julia Phipps PA-C, 2 mg at 07/27/22 0605    piperacillin-tazobactam (ZOSYN) 3.375 g in 0.9% sodium chloride (MBP/ADV) 100 mL MBP, 3.375 g, IntraVENous, Q8H, Samson Ceja MD, Last Rate: 25 mL/hr at 07/27/22 0400, 3.375 g at 07/27/22 0400    tiZANidine (ZANAFLEX) tablet 2 mg, 2 mg, Oral, TID, Moni Patel MD, 2 mg at 07/27/22 1000    vitamin E acetate capsule 800 Units, 800 Units, Oral, BID, Moni Patel MD, 800 Units at 07/27/22 0953    albuterol (PROVENTIL HFA, VENTOLIN HFA, PROAIR HFA) inhaler 1 Puff, 1 Puff, Inhalation, Q4H PRN, Moni Patel MD    ascorbic acid (vitamin C) (VITAMIN C) tablet 1,000 mg, 1,000 mg, Oral, BID, Moni Patel MD, 1,000 mg at 07/27/22 1083    aspirin chewable tablet 81 mg, 81 mg, Oral, DAILY, Moni Patel MD, 81 mg at 07/27/22 0949    bumetanide (BUMEX) tablet 2 mg, 2 mg, Oral, BID, Moni Patel MD, 2 mg at 07/27/22 4925    colchicine tablet 0.6 mg, 0.6 mg, Oral, DAILY, Moni Patel MD, 0.6 mg at 07/27/22 3958    ferrous sulfate tablet 325 mg, 1 Tablet, Oral, BIDPC, Moni Patel MD, 325 mg at 07/27/22 0953    fluticasone propionate (FLONASE) 50 mcg/actuation nasal spray 2 Spray, 2 Spray, Both Nostrils, BID, Moni Patel MD, 2 [place] at 07/27/22 0956    gabapentin (NEURONTIN) capsule 300 mg, 300 mg, Oral, QID, Dominga Patel MD, 300 mg at 07/27/22 0949    insulin glargine (LANTUS) injection 64 Units, 64 Units, SubCUTAneous, DAILY, Moni Patel MD, 32 Units at 07/27/22 0945    tamsulosin (FLOMAX) capsule 0.8 mg, 0.8 mg, Oral, DAILY, Moni Patel MD, 0.8 mg at 07/27/22 9827    insulin lispro (HUMALOG) injection, , SubCUTAneous, AC&HS, Dominga Patel MD    glucose chewable tablet 16 g, 4 Tablet, Oral, PRN, Dominga Patel MD    glucagon (GLUCAGEN) injection 1 mg, 1 mg, IntraMUSCular, PRN, Dominga Patel MD    polyethylene glycol (MIRALAX) packet 17 g, 17 g, Oral, DAILY PRN, Dominga Patel MD    ondansetron (ZOFRAN ODT) tablet 4 mg, 4 mg, Oral, Q8H PRN **OR** ondansetron (ZOFRAN) injection 4 mg, 4 mg, IntraVENous, Q6H PRN, Moni Patel MD    Current Outpatient Medications   Medication Instructions    albuterol (PROVENTIL HFA, VENTOLIN HFA, PROAIR HFA) 90 mcg/actuation inhaler 1 Puff, Inhalation, EVERY 4 HOURS AS NEEDED    ammonium lactate (AMLACTIN) 12 % topical cream Topical, AS NEEDED, rub in to affected area well    ascorbic acid (vitamin C) (VITAMIN C) 1,000 mg, Oral, 2 TIMES DAILY aspirin 81 mg, Oral, DAILY    b complex vitamins tablet 1 Tablet, Oral, DAILY    bumetanide (BUMEX) 2 mg, Oral, 2 TIMES DAILY    colchicine 0.6 mg, Oral, DAILY    ferrous sulfate (Iron) 325 mg (65 mg iron) tablet Oral, 2 TIMES DAILY AFTER MEALS    flunisolide (NASAREL) 25 mcg (0.025 %) spry 2 Sprays, 2 TIMES DAILY    gabapentin (NEURONTIN) 300 mg, Oral, 4 TIMES DAILY    insulin glargine,hum.rec.anlog (TOUJEO MAX U-300 SOLOSTAR SC) 80 Units, SubCUTAneous, 2 TIMES DAILY, 80 units in am, 34 units in pm     insulin lispro (HUMALOG) 100 unit/mL injection SubCUTAneous, Sliding scale     oxyCODONE IR (ROXICODONE) 5 mg, Oral, EVERY 12 HOURS    tamsulosin (FLOMAX) 0.8 mg, Oral, DAILY    tiotropium (SPIRIVA) 18 mcg inhalation capsule 1 Capsule, Inhalation, DAILY    vitamin E (AQUA GEMS) 800 Units, Oral, 2 TIMES DAILY    vitamin E acetate 800 Units, Oral, 2 TIMES DAILY         Signed By: Joshua James MD     July 27, 2022

## 2022-07-28 LAB
GLUCOSE BLD STRIP.AUTO-MCNC: 110 MG/DL (ref 65–117)
GLUCOSE BLD STRIP.AUTO-MCNC: 122 MG/DL (ref 65–117)
GLUCOSE BLD STRIP.AUTO-MCNC: 141 MG/DL (ref 65–117)
GLUCOSE BLD STRIP.AUTO-MCNC: 160 MG/DL (ref 65–117)
GLUCOSE BLD STRIP.AUTO-MCNC: 191 MG/DL (ref 65–117)
PERFORMED BY, TECHID: ABNORMAL
PERFORMED BY, TECHID: NORMAL

## 2022-07-28 PROCEDURE — 77010033678 HC OXYGEN DAILY

## 2022-07-28 PROCEDURE — 99232 SBSQ HOSP IP/OBS MODERATE 35: CPT | Performed by: INTERNAL MEDICINE

## 2022-07-28 PROCEDURE — 97530 THERAPEUTIC ACTIVITIES: CPT

## 2022-07-28 PROCEDURE — 97161 PT EVAL LOW COMPLEX 20 MIN: CPT

## 2022-07-28 PROCEDURE — 74011250636 HC RX REV CODE- 250/636: Performed by: INTERNAL MEDICINE

## 2022-07-28 PROCEDURE — 74011636637 HC RX REV CODE- 636/637: Performed by: FAMILY MEDICINE

## 2022-07-28 PROCEDURE — 97165 OT EVAL LOW COMPLEX 30 MIN: CPT

## 2022-07-28 PROCEDURE — 94761 N-INVAS EAR/PLS OXIMETRY MLT: CPT

## 2022-07-28 PROCEDURE — 74011250637 HC RX REV CODE- 250/637: Performed by: FAMILY MEDICINE

## 2022-07-28 PROCEDURE — 65270000029 HC RM PRIVATE

## 2022-07-28 PROCEDURE — 74011250637 HC RX REV CODE- 250/637: Performed by: PHYSICIAN ASSISTANT

## 2022-07-28 PROCEDURE — 82962 GLUCOSE BLOOD TEST: CPT

## 2022-07-28 PROCEDURE — 74011000258 HC RX REV CODE- 258: Performed by: INTERNAL MEDICINE

## 2022-07-28 PROCEDURE — 74011250636 HC RX REV CODE- 250/636: Performed by: PHYSICIAN ASSISTANT

## 2022-07-28 RX ORDER — INSULIN GLARGINE 100 [IU]/ML
INJECTION, SOLUTION SUBCUTANEOUS
Qty: 1 ML | Refills: 2 | Status: SHIPPED | OUTPATIENT
Start: 2022-07-28

## 2022-07-28 RX ORDER — OXYCODONE HYDROCHLORIDE 5 MG/1
15 TABLET ORAL
Status: DISCONTINUED | OUTPATIENT
Start: 2022-07-28 | End: 2022-07-30 | Stop reason: HOSPADM

## 2022-07-28 RX ORDER — HYDROMORPHONE HYDROCHLORIDE 1 MG/ML
1 INJECTION, SOLUTION INTRAMUSCULAR; INTRAVENOUS; SUBCUTANEOUS
Status: DISCONTINUED | OUTPATIENT
Start: 2022-07-28 | End: 2022-07-30 | Stop reason: HOSPADM

## 2022-07-28 RX ORDER — TIZANIDINE 2 MG/1
2 TABLET ORAL 3 TIMES DAILY
Qty: 15 TABLET | Refills: 0 | Status: SHIPPED | OUTPATIENT
Start: 2022-07-28 | End: 2022-08-02

## 2022-07-28 RX ORDER — CIPROFLOXACIN 500 MG/1
500 TABLET ORAL 2 TIMES DAILY
Qty: 20 TABLET | Refills: 0 | Status: SHIPPED | OUTPATIENT
Start: 2022-07-28 | End: 2022-09-21

## 2022-07-28 RX ORDER — OXYCODONE HYDROCHLORIDE 5 MG/1
7.5 TABLET ORAL
Status: DISCONTINUED | OUTPATIENT
Start: 2022-07-28 | End: 2022-07-30 | Stop reason: HOSPADM

## 2022-07-28 RX ADMIN — ACETAMINOPHEN 1000 MG: 500 TABLET ORAL at 22:53

## 2022-07-28 RX ADMIN — Medication 800 UNITS: at 20:24

## 2022-07-28 RX ADMIN — TIZANIDINE 2 MG: 2 TABLET ORAL at 20:24

## 2022-07-28 RX ADMIN — HYDROMORPHONE HYDROCHLORIDE 1 MG: 1 INJECTION, SOLUTION INTRAMUSCULAR; INTRAVENOUS; SUBCUTANEOUS at 14:47

## 2022-07-28 RX ADMIN — FLUTICASONE PROPIONATE 2 SPRAY: 50 SPRAY, METERED NASAL at 09:06

## 2022-07-28 RX ADMIN — FERROUS SULFATE TAB 325 MG (65 MG ELEMENTAL FE) 325 MG: 325 (65 FE) TAB at 17:16

## 2022-07-28 RX ADMIN — GABAPENTIN 300 MG: 300 CAPSULE ORAL at 13:10

## 2022-07-28 RX ADMIN — FLUTICASONE PROPIONATE 2 SPRAY: 50 SPRAY, METERED NASAL at 22:28

## 2022-07-28 RX ADMIN — OXYCODONE 10 MG: 5 TABLET ORAL at 06:40

## 2022-07-28 RX ADMIN — BUMETANIDE 2 MG: 1 TABLET ORAL at 08:55

## 2022-07-28 RX ADMIN — GABAPENTIN 300 MG: 300 CAPSULE ORAL at 20:23

## 2022-07-28 RX ADMIN — INSULIN LISPRO 3 UNITS: 100 INJECTION, SOLUTION INTRAVENOUS; SUBCUTANEOUS at 13:01

## 2022-07-28 RX ADMIN — OXYCODONE HYDROCHLORIDE AND ACETAMINOPHEN 1000 MG: 500 TABLET ORAL at 20:22

## 2022-07-28 RX ADMIN — MORPHINE SULFATE 2 MG: 2 INJECTION, SOLUTION INTRAMUSCULAR; INTRAVENOUS at 13:01

## 2022-07-28 RX ADMIN — Medication 1 CAPSULE: at 08:56

## 2022-07-28 RX ADMIN — ASPIRIN 81 MG CHEWABLE TABLET 81 MG: 81 TABLET CHEWABLE at 08:55

## 2022-07-28 RX ADMIN — ACETAMINOPHEN 1000 MG: 500 TABLET ORAL at 13:01

## 2022-07-28 RX ADMIN — GABAPENTIN 300 MG: 300 CAPSULE ORAL at 08:55

## 2022-07-28 RX ADMIN — ACETAMINOPHEN 1000 MG: 500 TABLET ORAL at 06:31

## 2022-07-28 RX ADMIN — GABAPENTIN 300 MG: 300 CAPSULE ORAL at 17:16

## 2022-07-28 RX ADMIN — TIZANIDINE 2 MG: 2 TABLET ORAL at 08:55

## 2022-07-28 RX ADMIN — Medication 800 UNITS: at 08:55

## 2022-07-28 RX ADMIN — PIPERACILLIN AND TAZOBACTAM 3.38 G: 3; .375 INJECTION, POWDER, FOR SOLUTION INTRAVENOUS at 14:53

## 2022-07-28 RX ADMIN — BUMETANIDE 2 MG: 1 TABLET ORAL at 20:22

## 2022-07-28 RX ADMIN — INSULIN GLARGINE 27 UNITS: 100 INJECTION, SOLUTION SUBCUTANEOUS at 22:52

## 2022-07-28 RX ADMIN — TAMSULOSIN HYDROCHLORIDE 0.8 MG: 0.4 CAPSULE ORAL at 08:56

## 2022-07-28 RX ADMIN — HYDROMORPHONE HYDROCHLORIDE 1 MG: 1 INJECTION, SOLUTION INTRAMUSCULAR; INTRAVENOUS; SUBCUTANEOUS at 22:28

## 2022-07-28 RX ADMIN — OXYCODONE 15 MG: 5 TABLET ORAL at 17:12

## 2022-07-28 RX ADMIN — MORPHINE SULFATE 2 MG: 2 INJECTION, SOLUTION INTRAMUSCULAR; INTRAVENOUS at 03:03

## 2022-07-28 RX ADMIN — TIZANIDINE 2 MG: 2 TABLET ORAL at 17:16

## 2022-07-28 RX ADMIN — POLYETHYLENE GLYCOL 3350 17 G: 17 POWDER, FOR SOLUTION ORAL at 22:28

## 2022-07-28 RX ADMIN — COLCHICINE 0.6 MG: 0.6 TABLET, FILM COATED ORAL at 08:55

## 2022-07-28 RX ADMIN — PIPERACILLIN AND TAZOBACTAM 3.38 G: 3; .375 INJECTION, POWDER, FOR SOLUTION INTRAVENOUS at 22:27

## 2022-07-28 RX ADMIN — PIPERACILLIN AND TAZOBACTAM 3.38 G: 3; .375 INJECTION, POWDER, FOR SOLUTION INTRAVENOUS at 06:30

## 2022-07-28 RX ADMIN — FERROUS SULFATE TAB 325 MG (65 MG ELEMENTAL FE) 325 MG: 325 (65 FE) TAB at 08:55

## 2022-07-28 RX ADMIN — INSULIN GLARGINE 64 UNITS: 100 INJECTION, SOLUTION SUBCUTANEOUS at 08:56

## 2022-07-28 RX ADMIN — OXYCODONE 15 MG: 5 TABLET ORAL at 20:21

## 2022-07-28 RX ADMIN — OXYCODONE HYDROCHLORIDE AND ACETAMINOPHEN 1000 MG: 500 TABLET ORAL at 08:55

## 2022-07-28 NOTE — PROGRESS NOTES
Bedside shift change report given to Cal Jeffrey (oncoming nurse) by Diego Zayas (offgoing nurse). Report included the following information SBAR.

## 2022-07-28 NOTE — PROGRESS NOTES
Spiritual Care Assessment/Progress Note  [facility]      NAME: Lilliana Baker      MRN: [id number]  AGE: 78 y.o. SEX: male  Congregational Affiliation: No preference   Language: English     7/28/2022     Total Time (in minutes): 10     Spiritual Assessment begun in [address] through conversation with:         [x]Patient        [] Family    [] Friend(s)        Reason for Consult: Initial visit     Spiritual beliefs: (Please include comment if needed)     [] Identifies with a case tradition:         [] Supported by a case community:            [] Claims no spiritual orientation:           [] Seeking spiritual identity:                [] Adheres to an individual form of spirituality:           [x] Not able to assess:                           Identified resources for coping:      [] Prayer                               [] Music                  [] Guided Imagery     [] Family/friends                 [] Pet visits     [] Devotional reading                         [x] Unknown     [] Other:                                              Interventions offered during this visit: (See comments for more details)    Patient Interventions: Initial visit, Other (comment) (Silent support and prayer)           Plan of Care:     [] Support spiritual and/or cultural needs    [] Support AMD and/or advance care planning process      [] Support grieving process   [] Coordinate Rites and/or Rituals    [] Coordination with community clergy   [] No spiritual needs identified at this time   [] Detailed Plan of Care below (See Comments)  [] Make referral to Music Therapy  [] Make referral to Pet Therapy     [] Make referral to Addiction services  [] Make referral to [facility]  [] Make referral to Spiritual Care Partner  [] No future visits requested        [x] Contact Spiritual Care for further referrals     Comments:  Visited patient in [facility] for initial assessment. Staff was providing care for patient during the visit. No visitors were present. Provided silent support and prayer. Contact chaplains for further referrals. Chaplain Darryle Leep, M.Div.    can be reached by calling the  at [facility]  [phone]

## 2022-07-28 NOTE — DISCHARGE SUMMARY
Discharge Summary       PATIENT ID: Kory Barrett  MRN: [id number]   YOB: 1955    DATE OF ADMISSION: 7/25/2022  6:36 PM    DATE OF DISCHARGE:   PRIMARY CARE PROVIDER: Maynor Dickey MD     ATTENDING PHYSICIAN: Charley Patel  DISCHARGING PROVIDER: Charley Patel      CONSULTATIONS: IP CONSULT TO ORTHOPEDIC SURGERY  IP CONSULT TO INFECTIOUS DISEASES  IP CONSULT TO PODIATRY    PROCEDURES/SURGERIES: * No surgery found *    ADMITTING DIAGNOSES:    Patient Active Problem List    Diagnosis Date Noted    UTI (urinary tract infection) 07/25/2022    Back pain 07/25/2022    MAURICIO (acute kidney injury) ([address] 07/11/2022    Non-pressure chronic ulcer of other part of right lower leg with fat layer exposed ([address] 06/15/2022    Calf ulcer, left, with fat layer exposed ([address] 04/06/2022    Ankle ulcer, right, with fat layer exposed ([address] 04/06/2022    Ulcer of right heel, with fat layer exposed ([address] 03/23/2022    Ulcer of left foot, with fat layer exposed ([address] 03/09/2022    Non-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 ([address] 03/02/2022    Venous ulcer ([address] 06/17/2021    Cellulitis and abscess of right leg 06/02/2021    Cellulitis 06/01/2021    COVID-19 01/04/2021    Type 2 diabetes mellitus with diabetic polyneuropathy, with long-term current use of insulin ([address] 11/23/2020    Hyperkeratosis 11/23/2020    Onychomycosis 11/23/2020    Localized edema 09/21/2020    Idiopathic chronic venous hypertension of left lower extremity with ulcer ([address] 09/14/2020       DISCHARGE DIAGNOSES / PLAN:      UTI  DDD  morbid obesity  DM  HTN  CHF  COPD  Gout  Sleep apnea  Chronic venous stasis ulcers  L5 discectomy  Pacemaker (MRI compatible)  Protein-calorie malnutrition        DISCHARGE MEDICATIONS:  Current Discharge Medication List        START taking these medications    Details   insulin glargine (LANTUS) 100 unit/mL injection 27-unit subcu at bedtime  Qty: 1 mL, Refills: 2  Start date: 7/28/2022      ciprofloxacin HCl (Cipro) 500 mg tablet Take 1 Tablet by mouth two (2) times a day. Qty: 20 Tablet, Refills: 0  Start date: 7/28/2022           CONTINUE these medications which have CHANGED    Details   tiZANidine (ZANAFLEX) 2 mg tablet Take 1 Tablet by mouth three (3) times daily for 5 days. Qty: 15 Tablet, Refills: 0  Start date: 7/28/2022, End date: 8/2/2022           CONTINUE these medications which have NOT CHANGED    Details   insulin glargine,hum.rec.anlog (TOUJEO MAX U-300 SOLOSTAR SC) 80 Units by SubCUTAneous route two (2) times a day. 80 units in am, 34 units in pm      b complex vitamins tablet Take 1 Tablet by mouth daily. ascorbic acid, vitamin C, (VITAMIN C) 500 mg tablet Take 1,000 mg by mouth two (2) times a day. Indications: inadequate vitamin C      tamsulosin (FLOMAX) 0.4 mg capsule Take 0.8 mg by mouth daily. tiotropium (SPIRIVA) 18 mcg inhalation capsule Take 1 Capsule by inhalation daily. bumetanide (BUMEX) 2 mg tablet Take 2 mg by mouth two (2) times a day. ferrous sulfate (Iron) 325 mg (65 mg iron) tablet Take  by mouth two (2) times daily (after meals). aspirin 81 mg chewable tablet Take 81 mg by mouth daily. flunisolide (NASAREL) 25 mcg (0.025 %) spry 2 Sprays two (2) times a day. albuterol (PROVENTIL HFA, VENTOLIN HFA, PROAIR HFA) 90 mcg/actuation inhaler Take 1 Puff by inhalation every four (4) hours as needed for Wheezing or Shortness of Breath. colchicine 0.6 mg tablet Take 0.6 mg by mouth daily. gabapentin (NEURONTIN) 300 mg capsule Take 300 mg by mouth four (4) times daily. oxyCODONE IR (ROXICODONE) 5 mg immediate release tablet Take 5 mg by mouth every twelve (12) hours. insulin lispro (HUMALOG) 100 unit/mL injection by SubCUTAneous route. Sliding scale      ammonium lactate (AMLACTIN) 12 % topical cream Apply  to affected area as needed for Other (xerosis).  rub in to affected area well  Indications: dry skin  Qty: 280 g, Refills: 0      vitamin E, dl,tocopheryl acet, (vitamin E acetate) 400 unit capsule Take 2 Capsules by mouth two (2) times a day. Qty: 100 Capsule, Refills: 0           STOP taking these medications       vitamin E (AQUA GEMS) 400 unit capsule Comments:   Reason for Stopping:                 NOTIFY YOUR PHYSICIAN FOR ANY OF THE FOLLOWING:   Fever over 101 degrees for 24 hours. Chest pain, shortness of breath, fever, chills, nausea, vomiting, diarrhea, change in mentation, falling, weakness, bleeding. Severe pain or pain not relieved by medications. Or, any other signs or symptoms that you may have questions about. DISPOSITION:  x  Home With:   OT  PT  HH  RN       Long term SNF/Inpatient Rehab    Independent/assisted living    Hospice    Other:       PATIENT CONDITION AT DISCHARGE: Stable      PHYSICAL EXAMINATION AT DISCHARGE:  General:          Alert, cooperative, no distress, appears stated age. HEENT:           Atraumatic, anicteric sclerae, pink conjunctivae                          No oral ulcers, mucosa moist, throat clear, dentition fair  Neck:               Supple, symmetrical  Lungs:             Clear to auscultation bilaterally. No Wheezing or Rhonchi. No rales. Chest wall:      No tenderness  No Accessory muscle use. Heart:              Regular  rhythm,  No  murmur   No edema  Abdomen:        Soft, non-tender. Not distended. Bowel sounds normal  Extremities:     No cyanosis. No clubbing,                            Skin turgor normal, Capillary refill normal  Skin:                Not pale. Not Jaundiced  No rashes   Psych:             Not anxious or agitated. Neurologic:      Alert, moves all extremities, answers questions appropriately and responds to commands     CT SPINE LUMB WO CONT   Final Result   Multilevel degenerative change detailed by level above most significant at L4-L5            XR HIPS BI W PELV 3 OR 4 VWS   Final Result   No acute abnormality seen. XR KNEE RT MAX 2 VWS   Final Result   No acute fracture or dislocation. There is chronic appearing   cortical irregularity at the upper pole of the patella which may be related to   old quadriceps tendon injury. CT ABD PELV WO CONT   Final Result      1. Bilateral nonobstructing renal stones. No hydronephrosis   2. Equivocal nodularity of the liver to suggest cirrhosis           Recent Results (from the past 24 hour(s))   GLUCOSE, POC    Collection Time: 07/27/22  4:34 PM   Result Value Ref Range    Glucose (POC) 105 65 - 117 mg/dL    Performed by Markie Anderson    GLUCOSE, POC    Collection Time: 07/27/22  8:39 PM   Result Value Ref Range    Glucose (POC) 108 65 - 117 mg/dL    Performed by Valentino Burns, POC    Collection Time: 07/28/22  6:50 AM   Result Value Ref Range    Glucose (POC) 122 (H) 65 - 117 mg/dL    Performed by [facility] COURSE:  Patient is a 78y.o. year old male with a significant PMH of morbid obesity, DM, HTN, CHF, COPD, gout, sleep apnea chronic venous stasis ulcers more in the left leg than right CAD, hx of L5 discectomy and recent MAURICIO presented to the ED for low back pain for the past three weeks. The patient says that the pain is in his lower back and radiates to his right flank and down his left leg. The pain is aggravated by movement of the right leg and he rates it as a 10/10. The pain improves when he lays still although he still has occasional shooting pain. Patient has current constipation. Patient denies chest pain, cough, SOB, changes in vision. WBC 13.5k  BNP 1465  CRP 10.6  Albumin 2.5  >100 WBCs on UA w/ LE     7/27  Patient laying in bed stating his condition has not improved. NAD  Blood glucose 80 this morning.  Will recheck after breakfast.  Albumin 2.5  Knee x-ray: No acute fracture or dislocation  CT Lumbar spine ordered for today  Blood sugars running low  At home he take Lantus 80 units subcu daily     7/28  Pt sitting in bed, NAD. States his pain radiates down both of his legs and complains of constipation  Pt states he has a history of urinary stress incontinence which is unchanged. Denies bowel incontinence. Pt on 3 L medical air  VSS     CT LUMBAR SPINE WITHOUT  CONTRAST  IMPRESSION  Multilevel degenerative change detailed by level above most significant at L4-L5     XR HIPS BI W PELV 3 OR 4 VWS  No acute abnormality seen.      Urine culture: Mixed urogenital lily isolated     If cleared by the orthopedics patient can discharge to rehab    Medication reconciliation done follow-up with orthopedics in [place] in 1 to 2 weeks    Patient follow-up with the urology as an outpatient      Signed:   Horace Spatz, MD  7/28/2022  11:50 AM

## 2022-07-28 NOTE — PROGRESS NOTES
CM spoke with patient at bedside. Discharge plans for SNF with rec's for SNF. Patient gave choice for [facility] and rehab. Referral sent via [facility].

## 2022-07-28 NOTE — PROGRESS NOTES
OCCUPATIONAL THERAPY EVALUATION  Patient: Devora Beal (89 y.o. male)  Date: 7/28/2022  Primary Diagnosis: UTI (urinary tract infection) [N39.0]  Back pain [M54.9]       Precautions: fall risk       ASSESSMENT  Pt is a 77 y/o M with PMH of DM, HTN, CHF, COPD, gout, sleep apnea, and chronic venous stasis ulcers in BLE presenting to [facility] with c/o low back pain for at least 3 weeks that radiates to R flank and down his L leg, admitted 7/25 and being treated for UTI and back pain. CT lumbar spine shows multilevel degenerative change detailed by level above most significant at L4-L5. L knee x-ray shows no acute fracture or dislocation. Pt received semi-supine in bed upon arrival w/out O2 donned (pt reports he wears 3 L at baseline, therefore, O2 donned), AXO x4, and agreeable to OT/PT evaluations at this time. Per pt report, pt lives with spouse in a one-story home with ramp access, was IND with ADLs and Mod I using rollator for mobility at [facility]. Pt reports increased difficultly within the past couple of weeks w/ ambulation d/t increased pain. Pt reports he forced himself to get out of bad and walk to his computer chair and back to his bed. He reports even with his raised toilet seat he has a hard time getting up. Other DME owned includes: hospital bed, rollator, raised toilet seat, sock aid, reacher      Based on current observations, pt presents with deficits in generalized strength/AROM, bed mobility, static/dynamic sitting balance, static/dynamic standing balance, functional activity tolerance, and pain impacting overall performance of ADLs and functional transfers/mobility. Pt currently requires max A for bed mobility and sup>sit transfer via log roll transfer w/ HOB elevated. Pt demo'd intact sitting balance at EOB despite increased pain in BLE. Pt req'd total A to complete UB bathing. PT entered room and pt req'd max A x2 for standing attempts x2 (unable to clear EOB d/t pain).  Pt returned to supine w/ max A and req'd max A to roll L/R to change hospital gown w/ mod A, bed linens and rai pad. Overall, pt tolerates session fair with c/o 10/10 pain in BLE and lower back at rest (reports increased pain w/ activity); RN informed of pain. Pt presented w/ SOB throughout session, however, O2 remained >90% throughout session. Pt would benefit from continued skilled OT services to address current impairments and improve IND and safety with self cares and functional transfers/mobility. Current OT d/c recommendation SNF once medically appropriate to maximize safety/IND. Other factors to consider for discharge: family/social support, DME, time since onset, severity of deficits, decline from functional baseline     Patient will benefit from skilled therapy intervention to address the above noted impairments. PLAN :  Recommendations and Planned Interventions: self care training, functional mobility training, therapeutic exercise, balance training, therapeutic activities, endurance activities, and patient education    Frequency/Duration: Patient will be followed by occupational therapy:  3-5x/week to address goals. Recommendation for discharge: (in order for the patient to meet his/her long term goals)  Stanley Benz    This discharge recommendation:  Has been made in collaboration with the attending provider and/or case management    IF patient discharges home will need the following DME: TBD next placement        SUBJECTIVE:   Patient stated My legs and my back hurt.     OBJECTIVE DATA SUMMARY:   HISTORY:   Past Medical History:   Diagnosis Date    Arthritis     CAD (coronary artery disease)     Chronic obstructive pulmonary disease ([facility]     Diabetes ([facility]     Gout     Hypertension     Ill-defined condition     Sleep apnea      Past Surgical History:   Procedure Laterality Date    HX ORTHOPAEDIC      HX PACEMAKER      HX VEIN ABLATION ADHESIVE         Expanded or extensive additional review of patient history:     Home Situation  Home Environment: Private residence  Wheelchair Ramp: Yes  One/Two Story Residence: One story  Living Alone: No  Support Systems: Spouse/Significant Other  Patient Expects to be Discharged to[de-identified] Rehab hospital/unit acute  Current DME Used/Available at Home: Clarice Stratford, rollator, Raised toilet seat, Hospital bed  Tub or Shower Type: Tub/Shower combination (sponge bath)    Hand dominance: Right    EXAMINATION OF PERFORMANCE DEFICITS:  Cognitive/Behavioral Status:  Neurologic State: Alert  Orientation Level: Oriented X4  Cognition: Appropriate decision making; Appropriate safety awareness; Follows commands             Hearing: Auditory  Auditory Impairment: None      Range of Motion:  AROM: Generally decreased, functional                         Strength:  Strength: Generally decreased, functional                Coordination:     Fine Motor Skills-Upper: Left Intact; Right Intact    Gross Motor Skills-Upper: Left Intact; Right Intact    Tone & Sensation:     Sensation: Intact                      Balance:  Sitting: Intact; High guard    Functional Mobility and Transfers for ADLs:  Bed Mobility:  Rolling: Maximum assistance  Supine to Sit: Maximum assistance;Bed Modified (HOB elevated)  Sit to Supine: Maximum assistance  Scooting: Moderate assistance    Transfers:  Sit to Stand: Maximum assistance;Assist x2 (unable to clear EOB w/ bed elevated)  Stand to Sit: Maximum assistance;Assist x2      ADL Intervention and task modifications:            Upper Body Bathing  Bathing Assistance: Total assistance(dependent)  Position Performed: Seated edge of bed         Upper [address]: Moderate assistance                   Therapeutic Exercise:  Pt will benefit from BUE HEP to improve participation in ADLs and mobility. Plan will be initiated at next session.        Functional Measure:    [facility] AM-PACTM \"6 Clicks\" Daily Activity Inpatient Short Form  How much help from another person does the patient currently need. .. Total; A Lot A Little None   1. Putting on and taking off regular lower body clothing? [x]  1 []  2 []  3 []  4   2. Bathing (including washing, rinsing, drying)? [x]  1 []  2 []  3 []  4   3. Toileting, which includes using toilet, bedpan or urinal? [x] 1 []  2 []  3 []  4   4. Putting on and taking off regular upper body clothing? []  1 [x]  2 []  3 []  4   5. Taking care of personal grooming such as brushing teeth? [x]  1 []  2 []  3 []  4   6. Eating meals? []  1 []  2 []  3 [x]  4   © 2007, Trustees of [address] [place] [address] [address] [address], under license to [employer]. All rights reserved     Score: 10/24     Interpretation of Tool:  Represents clinically-significant functional categories (i.e. Activities of daily living). Percentage of Impairment CH    0%   CI    1-19% CJ    20-39% CK    40-59% CL    60-79% CM    80-99% CN     100%   [facility]  Score 6-24 24 23 20-22 15-19 10-14 7-9 6         Occupational Therapy Evaluation Charge Determination   History Examination Decision-Making   LOW Complexity : Brief history review  LOW Complexity : 1-3 performance deficits relating to physical, cognitive , or psychosocial skils that result in activity limitations and / or participation restrictions  HIGH Complexity : Patient presents with comorbidities that affect occupational performance.  Signifigant modification of tasks or assistance (eg, physical or verbal) with assessment (s) is necessary to enable patient to complete evaluation       Based on the above components, the patient evaluation is determined to be of the following complexity level: LOW   Pain Rating:  10/10 pain in BLE and lower back    Activity Tolerance:   Poor, requires rest breaks, and observed SOB with activity    After treatment patient left in no apparent distress:    Supine in bed, Call bell within reach, Bed / chair alarm activated, and Side rails x 3    COMMUNICATION/EDUCATION:   The patients plan of care was discussed with: Physical therapist and Registered nurse. Patient/family have participated as able in goal setting and plan of care. and Patient/family agree to work toward stated goals and plan of care. This patients plan of care is not appropriate for delegation to ERI[facility]. OT/PT sessions occurred together for increased patient and clinician safety as pt with decreased activity tolerance and requires A of 2 for mobility at this time. Thank you for this referral.  Mary Shah, OT  Time Calculation: 46 mins   Problem: Self Care Deficits Care Plan (Adult)  Goal: *Acute Goals and Plan of Care (Insert Text)  Description: Pt stated goal \"I want to get better\".      Pt will be IND sup <>sit in prep for EOB ADLs  Pt will be Mod I grooming standing at EOB LRAD  Pt will be IND LE dressing sitting EOB/long sit  Pt will be Mod I sit <>  prep for toileting LRAD  Pt will be Mod I toileting/toilet transfer/cloth mgmt LRAD  Pt will be IND following UE HEP in prep for self care tasks   Outcome: Not Met

## 2022-07-28 NOTE — DISCHARGE INSTRUCTIONS
Discharge Instructions       PATIENT ID: Alexander Sharma  MRN: [id number]   YOB: 1955    DATE OF ADMISSION: [unfilled]    DATE OF DISCHARGE: 7/28/2022    PRIMARY CARE PROVIDER: @PCP@     ATTENDING PHYSICIAN: [unfilled]  DISCHARGING PROVIDER: Shania Early MD    To contact this individual call [phone] and ask the  to page. If unavailable ask to be transferred the Adult Hospitalist Department. DISCHARGE DIAGNOSES degenerative disc disease    CONSULTATIONS: [unfilled]    PROCEDURES/SURGERIES: * No surgery found *    PENDING TEST RESULTS:   At the time of discharge the following test results are still pending: None    [facility]   [facility]     ADDITIONAL CARE RECOMMENDATIONS: Need rehab    DIET: Diabetic Diet      ACTIVITY: Activity as tolerated    Wound care: Wound Care Order: submitted to [facility] Please view [email or web address]    EQUIPMENT needed: ***      DISCHARGE MEDICATIONS:   See Medication Reconciliation Form    It is important that you take the medication exactly as they are prescribed. Keep your medication in the bottles provided by the pharmacist and keep a list of the medication names, dosages, and times to be taken in your wallet. Do not take other medications without consulting your doctor. NOTIFY YOUR PHYSICIAN FOR ANY OF THE FOLLOWING:   Fever over 101 degrees for 24 hours. Chest pain, shortness of breath, fever, chills, nausea, vomiting, diarrhea, change in mentation, falling, weakness, bleeding. Severe pain or pain not relieved by medications. Or, any other signs or symptoms that you may have questions about.       DISPOSITION:    Home With:   OT  PT  HH  RN       SNF/Inpatient Rehab/LTAC    Independent/assisted living    Hospice    Other:         PROBLEM LIST Updated:  Yes ***       Signed:   Shania Early MD  7/28/2022  11:50 AM    Discharge Instructions       PATIENT ID: Alexander Sharma  MRN: [id number]   DATE OF BIRTH: 1955    DATE OF ADMISSION: [unfilled]    DATE OF DISCHARGE: 7/28/2022    PRIMARY CARE PROVIDER: @PCP@     ATTENDING PHYSICIAN: [unfilled]  DISCHARGING PROVIDER: Vickie Swain MD    To contact this individual call [phone] and ask the  to page. If unavailable ask to be transferred the Adult Hospitalist Department. DISCHARGE DIAGNOSES ***    CONSULTATIONS: [unfilled]    PROCEDURES/SURGERIES: * No surgery found *    PENDING TEST RESULTS:   At the time of discharge the following test results are still pending: ***    FOLLOW UP APPOINTMENTS:   [facility]     ADDITIONAL CARE RECOMMENDATIONS: ***    DIET: {diet:95272}      ACTIVITY: {discharge activity:22791}    Wound care: {[facility] Instructions:35969}    EQUIPMENT needed: ***      DISCHARGE MEDICATIONS:   See Medication Reconciliation Form    It is important that you take the medication exactly as they are prescribed. Keep your medication in the bottles provided by the pharmacist and keep a list of the medication names, dosages, and times to be taken in your wallet. Do not take other medications without consulting your doctor. NOTIFY YOUR PHYSICIAN FOR ANY OF THE FOLLOWING:   Fever over 101 degrees for 24 hours. Chest pain, shortness of breath, fever, chills, nausea, vomiting, diarrhea, change in mentation, falling, weakness, bleeding. Severe pain or pain not relieved by medications. Or, any other signs or symptoms that you may have questions about.       DISPOSITION:    Home With:   OT  PT  HH  RN       SNF/Inpatient Rehab/LTAC    Independent/assisted living    Hospice    Other:         PROBLEM LIST Updated:  Yes ***       Signed:   Vickie Swain MD  7/28/2022  11:50 AM

## 2022-07-28 NOTE — PROGRESS NOTES
PROGRESS NOTE    Patient: Barbara Watson MRN: [id number]  SSN: [id number]    YOB: 1955  Age: 78 y.o. Sex: male      Admit Date: 7/25/2022    LOS: 3 days       Subjective     Chief Complaint   Patient presents with    Back Pain       HPI: Patient is a 78y.o. year old male with a significant PMH of morbid obesity, DM, HTN, CHF, COPD, gout, sleep apnea chronic venous stasis ulcers more in the left leg than right CAD, hx of L5 discectomy and recent MAURICIO presented to the ED for low back pain for the past three weeks. The patient says that the pain is in his lower back and radiates to his right flank and down his left leg. The pain is aggravated by movement of the right leg and he rates it as a 10/10. The pain improves when he lays still although he still has occasional shooting pain. Patient has current constipation. Patient denies chest pain, cough, SOB, changes in vision. WBC 13.5k  BNP 1465  CRP 10.6  Albumin 2.5  >100 WBCs on UA w/ LE    7/27  Patient laying in bed stating his condition has not improved. NAD  Blood glucose 80 this morning. Will recheck after breakfast.  Albumin 2.5  Knee x-ray: No acute fracture or dislocation  CT Lumbar spine ordered for today  Blood sugars running low  At home he take Lantus 80 units subcu daily    7/28  Pt sitting in bed, NAD. States his pain radiates down both of his legs and complains of constipation  Pt states he has a history of urinary stress incontinence which is unchanged. Denies bowel incontinence. Pt on 3 L medical air   VSS    CT LUMBAR SPINE WITHOUT  CONTRAST  IMPRESSION  Multilevel degenerative change detailed by level above most significant at L4-L5    XR HIPS BI W PELV 3 OR 4 VWS  No acute abnormality seen. Urine culture: Mixed urogenital lily isolated        Review of Systems:  Constitutional: Negative for chills and fever. HENT: Negative. Eyes: Negative. Respiratory: Negative. Cardiovascular: Negative.     Gastrointestinal: Negative for abdominal pain and nausea. Skin: Negative. Neurological: Negative. MSK: Lower back pain radiating down right and left leg. Objective     Visit Vitals  /67   Pulse 76   Temp 97 °F (36.1 °C)   Resp 18   Ht 5' 11\" (1.803 m)   Wt 157.4 kg (347 lb)   SpO2 99%   BMI 48.40 kg/m²    O2 Flow Rate (L/min): 3 l/min O2 Device: CPAP mask    Physical Exam:   Constitutional: Obese pt is oriented to person, place, and time. HENT:  Head: Normocephalic and atraumatic. Eyes: Pupils are equal, round, and reactive to light. EOM are normal.  Cardiovascular: Normal rate, regular rhythm and normal heart sounds. Pulmonary/Chest: Breath sounds normal. No wheezes. No rales. Exhibits no tenderness. Abdominal: Soft. Bowel sounds are normal. There is no abdominal tenderness. There is no rebound and no guarding. Musculoskeletal: Pain to palpation of Rt flank. Pain with Rt straight leg raise. Neurological: pt is alert and oriented to person, place, and time. Alert. Normal strength. No cranial nerve deficit or sensory deficit. Displays a negative Romberg sign. Peripheral Vascular: [place] swelling on ankles and legs. Hemosiderin staining bilaterally. Small venus stasis ulcer to the right medial malleolus. Large venus stasis ulcer to left lateral lower leg. Intake & Output:  Current Shift: No intake/output data recorded. Last three shifts: 07/26 1901 - 07/28 0700  In: 1948 [P.O.:1748; I.V.:200]  Out: 3050 [Urine:3050]    Lab/Data Review:  Lab results reviewed. For significant abnormal values and values requiring intervention, see assessment and plan.        24 Hour Results:    Recent Results (from the past 24 hour(s))   GLUCOSE, POC    Collection Time: 07/27/22 11:31 AM   Result Value Ref Range    Glucose (POC) 51 (LL) 65 - 117 mg/dL    Performed by [place], POC    Collection Time: 07/27/22 11:37 AM   Result Value Ref Range    Glucose (POC) 61 (L) 65 - 117 mg/dL    Performed by [facility]    GLUCOSE, POC    Collection Time: 07/27/22  4:34 PM   Result Value Ref Range    Glucose (POC) 105 65 - 117 mg/dL    Performed by Alecia Yoder    GLUCOSE, POC    Collection Time: 07/27/22  8:39 PM   Result Value Ref Range    Glucose (POC) 108 65 - 117 mg/dL    Performed by Stephanie Roberts    GLUCOSE, POC    Collection Time: 07/28/22  6:50 AM   Result Value Ref Range    Glucose (POC) 122 (H) 65 - 117 mg/dL    Performed by Yinka Stoll          Imaging:    CT SPINE LUMB WO CONT   Final Result   Multilevel degenerative change detailed by level above most significant at L4-L5            XR HIPS BI W PELV 3 OR 4 VWS   Final Result   No acute abnormality seen. XR KNEE RT MAX 2 VWS   Final Result   No acute fracture or dislocation. There is chronic appearing   cortical irregularity at the upper pole of the patella which may be related to   old quadriceps tendon injury. CT ABD PELV WO CONT   Final Result      1. Bilateral nonobstructing renal stones. No hydronephrosis   2.  Equivocal nodularity of the liver to suggest cirrhosis             Assessment     ASSESSMENT:  UTI  Back pain  morbid obesity  DM  HTN  CHF  COPD  Gout  Sleep apnea  Chronic venous stasis ulcers  L5 discectomy  Pacemaker (MRI compatible)  Protein-calorie malnutrition    Plan   Continue meds:  Tylenol 1,000 mg po q6hrs  Vitamin C 1g PO BID  Aspirin 81mg PO every day  Bumex 2mg PO BID  Colchicine 0.6mg PO every day  Ferrous sulfate 325mg PO BID  Flonase 50mcg both nostrils BID  Neurontin 300mg PO Q6h  Sliding scale insulin  Zosyn 3.375g IV Q8h  Tamsulosin 0.8mg PO every day  Tizanidine 2mg PO TID  VitB 1 capsule PO every day  Vit E acetate 700 units PO BID  Oxycodone PRN     Polyethylene Glycol 17g dissolved in 240mL water every day, prn     Consult orthopedics  PT OT consult    Outpatient urology follow-up for recurrent stones    Hold Lantus      Current Facility-Administered Medications:     vitamin B complex capsule 1 Capsule, 1 Capsule, Oral, DAILY, Moni Patel MD, 1 Capsule at 07/27/22 0953    insulin glargine (LANTUS) injection 27 Units, 27 Units, SubCUTAneous, QHS, Moni Patel MD, 27 Units at 07/27/22 2213    acetaminophen (TYLENOL) tablet 1,000 mg, 1,000 mg, Oral, Q6H, 1,000 mg at 07/28/22 0631 **OR** [DISCONTINUED] acetaminophen (TYLENOL) suppository 650 mg, 650 mg, Rectal, Q6H PRN, Moni Patel MD    oxyCODONE IR (ROXICODONE) tablet 5 mg, 5 mg, Oral, Q4H PRN, Julia Phipps PA-C    oxyCODONE IR (ROXICODONE) tablet 10 mg, 10 mg, Oral, Q4H PRN, Julia Phipps PA-C, 10 mg at 07/28/22 0640    morphine injection 2 mg, 2 mg, IntraVENous, Q6H PRN, Julia Phipps PA-C, 2 mg at 07/28/22 0303    piperacillin-tazobactam (ZOSYN) 3.375 g in 0.9% sodium chloride (MBP/ADV) 100 mL MBP, 3.375 g, IntraVENous, Q8H, Samson Ceja MD, Last Rate: 25 mL/hr at 07/28/22 0630, 3.375 g at 07/28/22 0630    tiZANidine (ZANAFLEX) tablet 2 mg, 2 mg, Oral, TID, Alie Deluna MD, 2 mg at 07/27/22 2203    vitamin E acetate capsule 800 Units, 800 Units, Oral, BID, Moni Patel MD, 800 Units at 07/27/22 2203    albuterol (PROVENTIL HFA, VENTOLIN HFA, PROAIR HFA) inhaler 1 Puff, 1 Puff, Inhalation, Q4H PRN, Moni Patel MD    ascorbic acid (vitamin C) (VITAMIN C) tablet 1,000 mg, 1,000 mg, Oral, BID, Moni Patel MD, 1,000 mg at 07/27/22 2203    aspirin chewable tablet 81 mg, 81 mg, Oral, DAILY, Moni Patel MD, 81 mg at 07/27/22 0949    bumetanide (BUMEX) tablet 2 mg, 2 mg, Oral, BID, Alie Deluna MD, 2 mg at 07/27/22 2203    colchicine tablet 0.6 mg, 0.6 mg, Oral, DAILY, Moni Patel MD, 0.6 mg at 07/27/22 6606    ferrous sulfate tablet 325 mg, 1 Tablet, Oral, BIDPC, Moni Patel MD, 325 mg at 07/27/22 1800    fluticasone propionate (FLONASE) 50 mcg/actuation nasal spray 2 Spray, 2 Spray, Both Nostrils, BID, Moni Patel MD, 2 [place] at 07/27/22 0956    gabapentin (NEURONTIN) capsule 300 mg, 300 mg, Oral, QID, Moni Patel MD, 300 mg at 07/27/22 2203    insulin glargine (LANTUS) injection 64 Units, 64 Units, SubCUTAneous, DAILY, Moni Patel MD, 32 Units at 07/27/22 0945    tamsulosin (FLOMAX) capsule 0.8 mg, 0.8 mg, Oral, DAILY, Moni Patel MD, 0.8 mg at 07/27/22 3844    insulin lispro (HUMALOG) injection, , SubCUTAneous, AC&HS, Maegan Patel MD    glucose chewable tablet 16 g, 4 Tablet, Oral, PRN, Maegan Patel MD    glucagon (GLUCAGEN) injection 1 mg, 1 mg, IntraMUSCular, PRN, Maegan Patel MD    polyethylene glycol (MIRALAX) packet 17 g, 17 g, Oral, DAILY PRN, Maegan Patel MD    ondansetron (ZOFRAN ODT) tablet 4 mg, 4 mg, Oral, Q8H PRN **OR** ondansetron (ZOFRAN) injection 4 mg, 4 mg, IntraVENous, Q6H PRN, Moni Patel MD    Current Outpatient Medications   Medication Instructions    albuterol (PROVENTIL HFA, VENTOLIN HFA, PROAIR HFA) 90 mcg/actuation inhaler 1 Puff, Inhalation, EVERY 4 HOURS AS NEEDED    ammonium lactate (AMLACTIN) 12 % topical cream Topical, AS NEEDED, rub in to affected area well    ascorbic acid (vitamin C) (VITAMIN C) 1,000 mg, Oral, 2 TIMES DAILY    aspirin 81 mg, Oral, DAILY    b complex vitamins tablet 1 Tablet, Oral, DAILY    bumetanide (BUMEX) 2 mg, Oral, 2 TIMES DAILY    colchicine 0.6 mg, Oral, DAILY    ferrous sulfate (Iron) 325 mg (65 mg iron) tablet Oral, 2 TIMES DAILY AFTER MEALS    flunisolide (NASAREL) 25 mcg (0.025 %) spry 2 Sprays, 2 TIMES DAILY    gabapentin (NEURONTIN) 300 mg, Oral, 4 TIMES DAILY    insulin glargine,hum.rec.anlog (TOUJEO MAX U-300 SOLOSTAR SC) 80 Units, SubCUTAneous, 2 TIMES DAILY, 80 units in am, 34 units in pm     insulin lispro (HUMALOG) 100 unit/mL injection SubCUTAneous, Sliding scale     oxyCODONE IR (ROXICODONE) 5 mg, Oral, EVERY 12 HOURS    tamsulosin (FLOMAX) 0.8 mg, Oral, DAILY    tiotropium (SPIRIVA) 18 mcg inhalation capsule 1 Capsule, Inhalation, DAILY    vitamin E (AQUA GEMS) 800 Units, Oral, 2 TIMES DAILY    vitamin E acetate 800 Units, Oral, 2 TIMES DAILY         Signed By: Kristian Deng     July 28, 2022

## 2022-07-28 NOTE — PROGRESS NOTES
PHYSICAL THERAPY EVALUATION  Patient: Barbara Watson (89 y.o. male)  Date: 7/28/2022  Primary Diagnosis: UTI (urinary tract infection) [N39.0]  Back pain [M54.9]       Precautions: falls       ASSESSMENT  Pt is a 79 yo M with PMH of morbid obesity, DM, HTN, CHF, COPD, gout, sleep apnea chronic venous stasis ulcers, more L>R CAD, hx of L5 discectomy, and recent MAURICIO, presenting to ED with LBP as per H&P 7/26. Admitted 7/25/22, with UTI, back pain. Underwent CT of lumbar spine which showed multi-level degenerative changes, detailed by level above most significant at L4-L5 as per d/c note on 7/28. L knee x-ray shows no acute fracture of dislocation. Prior level received from OT. Per O2 pt wears 3L at baseline, pt lives with spouse in 1 level home with ramp access. Pt was indep with ADL and mod I using rollator for mobility at [facility]. Increased difficulty over past couple weeks w/ ambulation d/t increased pain. Owns hospital bed, rollator, raised toilet seat, sock aid, reacher    Based on the objective data described below, the patient presents with grossly decr BLE ROM, decr BLE strength, pain, decr sensory distal BLE, difficulty with transfers/mobility, decr functional indep     Mobility performed this session: Pt received at EOB with OT present, OT requesting another skilled clinician for transfer therefore PT joined OT for session. We attempted sit to stand 2x, which were completed with maxA x2. Each attempt, pt needed cueing for hand placement for completion, difficulty with clearing B hips off bed, along with increased B leg pain upon completion. Ortho PA made aware. For sit to supine transfer, we focused on log roll performance, which was completed with maxA x1 for LE management. Once In sideline position, ortho PA also present. Rolled to B side with maxA to assist with changing sheets and rai pad. Pt was repositioned and left in supine w/ all needs met.  Throughout session SpO2 stayed >90% on NC    Pt did fair with session today with high levels of BLE pain and lower back pain. Pt will benefit from continued skilled PT to address above deficits and return to PLOF. Current PT DC recommendation SNF. Other factors to consider for discharge: current mobility, severity of deficits, baseline mobility, pain levels      PLAN :  Recommendations and Planned Interventions: bed mobility training, transfer training, gait training, therapeutic exercises, neuromuscular re-education, patient and family training/education, and therapeutic activities      Frequency/Duration: Patient will be followed by physical therapy:  3-5x/week to address goals. Recommendation for discharge: (in order for the patient to meet his/her long term goals)  Stanley Benz    This discharge recommendation:  Has been made in collaboration with the attending provider and/or case management         SUBJECTIVE:   Patient stated my legs hurt.     OBJECTIVE DATA SUMMARY:   HISTORY:    Past Medical History:   Diagnosis Date    Arthritis     CAD (coronary artery disease)     Chronic obstructive pulmonary disease ([address]     Diabetes ([address]     Gout     Hypertension     Ill-defined condition     Sleep apnea      Past Surgical History:   Procedure Laterality Date    HX ORTHOPAEDIC      HX PACEMAKER      HX VEIN ABLATION ADHESIVE         Home Situation  Home Environment: Private residence  Wheelchair Ramp: Yes  One/Two Story Residence: One story  Living Alone: No  Support Systems: Spouse/Significant Other  Patient Expects to be Discharged to[de-identified] Rehab hospital/unit acute  Current DME Used/Available at Home: Maebelle Cramp, rollator, Raised toilet seat, Hospital bed  Tub or Shower Type: Tub/Shower combination (sponge bath)    EXAMINATION/PRESENTATION/DECISION MAKING:   Critical Behavior:  Neurologic State: Alert  Orientation Level: Oriented X4  Cognition: Appropriate decision making, Appropriate safety awareness, Follows commands     Range Of Motion:  AROM: Generally decreased, functional (decr BLE)                       Strength:    Strength: Generally decreased, functional (4/5 B DF, 3-/5 knee ext B)                    Tone & Sensation:                  Sensation: Impaired (B distal LE)               Coordination:     Vision:      Functional Mobility:  Bed Mobility:  Rolling: Maximum assistance  Supine to Sit:  (Pt up at EOB upon PT arrival w/ OT)  Sit to Supine: Maximum assistance  Scooting: Moderate assistance  Transfers:  Sit to Stand: Maximum assistance;Assist x2  Stand to Sit: Maximum assistance;Assist x2                       Balance:   Sitting: Intact; Without support  Ambulation/Gait Training:    Functional Measure:  [facility] [employer] AM-PAC 6 Clicks         Basic Mobility Inpatient Short Form  How much difficulty does the patient currently have. .. Unable A Lot A Little None   1. Turning over in bed (including adjusting bedclothes, sheets and blankets)? [] 1   [x] 2   [] 3   [] 4   2. Sitting down on and standing up from a chair with arms ( e.g., wheelchair, bedside commode, etc.)   [x] 1   [] 2   [] 3   [] 4   3. Moving from lying on back to sitting on the side of the bed? [] 1   [x] 2   [] 3   [] 4          How much help from another person does the patient currently need. .. Total A Lot A Little None   4. Moving to and from a bed to a chair (including a wheelchair)? [x] 1   [] 2   [] 3   [] 4   5. Need to walk in hospital room? [x] 1   [] 2   [] 3   [] 4   6. Climbing 3-5 steps with a railing? [x] 1   [] 2   [] 3   [] 4   © 2007, Trustees of [facility] [employer], under license to [employer]. All rights reserved     Score:  Initial: 8/24 Most Recent: (Date: 7/28/22 )   Interpretation of Tool:  Represents activities that are increasingly more difficult (i.e. Bed mobility, Transfers, Gait).   Score 24 23 22-20 19-15 14-10 9-7 6   Modifier CH CI CJ CK CL CM CN         Physical Therapy Evaluation Charge Determination   History Examination Presentation Decision-Making   MEDIUM  Complexity : 1-2 comorbidities / personal factors will impact the outcome/ POC  MEDIUM Complexity : 3 Standardized tests and measures addressing body structure, function, activity limitation and / or participation in recreation  LOW Complexity : Stable, uncomplicated  Other outcome measures [facility] 6  8/24      Based on the above components, the patient evaluation is determined to be of the following complexity level: LOW     Pain Rating:  10/10 LB + legs    Activity Tolerance:   Fair and requires rest breaks    After treatment patient left in no apparent distress:   Supine in bed and Call bell within reach and RN updated. GOALS:    Problem: Mobility Impaired (Adult and Pediatric)  Goal: *Acute Goals and Plan of Care (Insert Text)  Description:   Pt will be I with LE HEP in 7 days. Pt will perform bed mobility with mod I in 7 days. Pt will perform transfers with mod I in 7 days. Pt will amb >100 feet with LRAD safely with mod I in 7 days. Outcome: Not Met       COMMUNICATION/EDUCATION:   The patients plan of care was discussed with: Registered nurse and Case management. Patient/family have participated as able in goal setting and plan of care.        Thank you for this referral.  Amber Newton, PT, PT, DPT   Time Calculation: 21 mins

## 2022-07-28 NOTE — PROGRESS NOTES
Orthopedic progress note    Date:2022       Room:[facility]  Patient Aron Piedra     YOB: 1955     Age:66 y.o. Subjective    Follow-up on a 79-year-old male with right-sided lower extremity pain and back pain. Patient still complaining of moderate pain of his back and right lower extremity. Patient does feel the pain is starting in his left knee similar to his right knee as well. Will progress with therapy.   Objective           Vitals Last 24 Hours:  TEMPERATURE:  Temp  Av.7 °F (36.5 °C)  Min: 97 °F (36.1 °C)  Max: 98.2 °F (36.8 °C)  RESPIRATIONS RANGE: Resp  Av.2  Min: 16  Max: 20  PULSE OXIMETRY RANGE: SpO2  Av.4 %  Min: 96 %  Max: 100 %  PULSE RANGE: Pulse  Av  Min: 76  Max: 89  BLOOD PRESSURE RANGE: Systolic (54RAE), YOU:547 , Min:116 , FNI:915   ; Diastolic (07MWB), AOS:42, Min:62, Max:74    Current Facility-Administered Medications   Medication Dose Route Frequency    vitamin B complex capsule 1 Capsule  1 Capsule Oral DAILY    insulin glargine (LANTUS) injection 27 Units  27 Units SubCUTAneous QHS    acetaminophen (TYLENOL) tablet 1,000 mg  1,000 mg Oral Q6H    oxyCODONE IR (ROXICODONE) tablet 5 mg  5 mg Oral Q4H PRN    oxyCODONE IR (ROXICODONE) tablet 10 mg  10 mg Oral Q4H PRN    morphine injection 2 mg  2 mg IntraVENous Q6H PRN    piperacillin-tazobactam (ZOSYN) 3.375 g in 0.9% sodium chloride (MBP/ADV) 100 mL MBP  3.375 g IntraVENous Q8H    tiZANidine (ZANAFLEX) tablet 2 mg  2 mg Oral TID    vitamin E acetate capsule 800 Units  800 Units Oral BID    albuterol (PROVENTIL HFA, VENTOLIN HFA, PROAIR HFA) inhaler 1 Puff  1 Puff Inhalation Q4H PRN    ascorbic acid (vitamin C) (VITAMIN C) tablet 1,000 mg  1,000 mg Oral BID    aspirin chewable tablet 81 mg  81 mg Oral DAILY    bumetanide (BUMEX) tablet 2 mg  2 mg Oral BID    colchicine tablet 0.6 mg  0.6 mg Oral DAILY    ferrous sulfate tablet 325 mg  1 Tablet Oral BIDPC    fluticasone propionate (FLONASE) 50 mcg/actuation nasal spray 2 Spray  2 Spray Both Nostrils BID    gabapentin (NEURONTIN) capsule 300 mg  300 mg Oral QID    insulin glargine (LANTUS) injection 64 Units  64 Units SubCUTAneous DAILY    tamsulosin (FLOMAX) capsule 0.8 mg  0.8 mg Oral DAILY    insulin lispro (HUMALOG) injection   SubCUTAneous AC&HS    glucose chewable tablet 16 g  4 Tablet Oral PRN    glucagon (GLUCAGEN) injection 1 mg  1 mg IntraMUSCular PRN    polyethylene glycol (MIRALAX) packet 17 g  17 g Oral DAILY PRN    ondansetron (ZOFRAN ODT) tablet 4 mg  4 mg Oral Q8H PRN    Or    ondansetron (ZOFRAN) injection 4 mg  4 mg IntraVENous Q6H PRN          I/O (24Hr): Intake/Output Summary (Last 24 hours) at 7/28/2022 1331  Last data filed at 7/28/2022 0630  Gross per 24 hour   Intake 1340 ml   Output 2000 ml   Net -660 ml     Objective:  Vital signs: (most recent): Blood pressure 134/73, pulse 85, temperature 98.2 °F (36.8 °C), resp. rate 19, height 5' 11\" (1.803 m), weight 157.4 kg (347 lb), SpO2 99 %. Labs/Imaging/Diagnostics    Labs:  CBC:  Recent Labs     07/27/22 0733 07/26/22 0524 07/25/22 1933   WBC 9.1 11.5* 13.5*   RBC 3.81* 3.43* 3.43*   HGB 11.2* 10.3* 10.3*   HCT 35.8* 32.1* 32.2*   MCV 94.0 93.6 93.9   RDW 12.8 12.9 12.9   * 433* 436*     CHEMISTRIES:  Recent Labs     07/27/22  0733 07/26/22  0524 07/25/22 1933    139 135*   K 4.1 4.0 3.8    106 101   CO2 28 27 27   BUN 19 22* 28*   CREA 1.23 1.20 1.55*   CA 8.9 8.6 8.9   PT/INR:No results for input(s): INR, INREXT in the last 72 hours. No lab exists for component: PROTIME  APTT:No results for input(s): APTT in the last 72 hours. LIVER PROFILE:  Recent Labs     07/27/22  0733 07/26/22  0524 07/25/22  1933   AST 25 23 25   ALT 37 36 39     Lab Results   Component Value Date/Time    ALT (SGPT) 37 07/27/2022 07:33 AM    AST (SGOT) 25 07/27/2022 07:33 AM    Alk.  phosphatase 83 07/27/2022 07:33 AM    Bilirubin, total 0.4 07/27/2022 07:33 AM Imaging:  X-rays taken of his right knee show no acute fracture or dislocation. Chronic degenerative changes seen in the upper portion of the patella. Possible quadricep tendon injury. X-rays taken of his bilateral hips shows no acute fractures. CT scan taken of his lumbar spine shows multilevel degenerative changes with severe canal stenosis at L4-5 moderate canal stenosis L3-L4. Also moderate to severe bilateral foraminal stenosis at L3-L4. No acute fracture seen. Assessment//Plan           Patient Active Problem List    Diagnosis Date Noted    UTI (urinary tract infection) 07/25/2022    Back pain 07/25/2022    MAURICIO (acute kidney injury) ([address] 07/11/2022    Non-pressure chronic ulcer of other part of right lower leg with fat layer exposed ([address] 06/15/2022    Calf ulcer, left, with fat layer exposed ([address] 04/06/2022    Ankle ulcer, right, with fat layer exposed ([address] 04/06/2022    Ulcer of right heel, with fat layer exposed ([address] 03/23/2022    Ulcer of left foot, with fat layer exposed ([address] 03/09/2022    Non-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 ([address] 03/02/2022    Venous ulcer ([address] 06/17/2021    Cellulitis and abscess of right leg 06/02/2021    Cellulitis 06/01/2021    COVID-19 01/04/2021    Type 2 diabetes mellitus with diabetic polyneuropathy, with long-term current use of insulin ([address] 11/23/2020    Hyperkeratosis 11/23/2020    Onychomycosis 11/23/2020    Localized edema 09/21/2020    Idiopathic chronic venous hypertension of left lower extremity with ulcer ([address] 09/14/2020     Degenerative disc disease lumbar spine  Canal and foraminal stenosis L3-L4, L 4 to L5. Given this patient's current clinical presentation he is not a surgical candidate. Did discuss the possible use of steroid therapy for his discomfort we will hold off on that at this time given his cellulitis of his lower extremities and recent UTI.   Continue with current pain management. From his chart appears the patient has chronic pain which has been followed as an outpatient. Patient can discharge from orthopedics when cleared by medicine/ID and he can follow-up with Dr. Joel Kidd as outpatient when discharged.     Electronically signed by Bebe Osuna PA-C on 7/28/2022 at 1:31 PM

## 2022-07-28 NOTE — PROGRESS NOTES
Infectious Disease Progress Note           Subjective:   No acute events since last seen, denies new complaints. Remains stable, afebrile, WBC normalized   Objective:   Physical Exam:     Visit Vitals  /70   Pulse 87   Temp 99 °F (37.2 °C)   Resp 18   Ht 5' 11\" (1.803 m)   Wt 347 lb (157.4 kg)   SpO2 97%   BMI 48.40 kg/m²    O2 Flow Rate (L/min): 3 l/min O2 Device: CPAP mask    Temp (24hrs), Av.1 °F (36.7 °C), Min:97 °F (36.1 °C), Max:99 °F (37.2 °C)    No intake/output data recorded.  1901 -  0700  In: 1948 [P.O.:1748;  I.V.:200]  Out: 3050 [Urine:3050]    General: NAD, AAO x 4  HEENT: SURYA, Moist mucosa   Lungs: CTA b/l, decreased at the bases, no wheeze/rhonchi   Heart: S1S2+, RRR, no murmur  Abdo: Soft, NT, ND, +BS   : No ryan cath   Exts: No edema, + pulses b/l   Skin: No wounds, No rashes or lesions      Data Review:       Recent Days:  Recent Labs     22   WBC 9.1 11.5* 13.5*   HGB 11.2* 10.3* 10.3*   HCT 35.8* 32.1* 32.2*   * 433* 436*       Recent Labs     22   BUN 19 22* 28*   CREA 1.23 1.20 1.55*         Lab Results   Component Value Date/Time    C-Reactive protein 10.60 (H) 2022 07:33 PM       Microbiology     Results       Procedure Component Value Units Date/Time    CULTURE, BLOOD, LINE [[id number]] Collected: 22    Order Status: Sent Specimen: Blood Updated: 22    CULTURE, BLOOD, LINE [[id number]] Collected: 22    Order Status: Sent Specimen: Blood Updated: 07/26/22 0007    CULTURE, URINE [[id number]] Collected: 22 2330    Order Status: Canceled Specimen: Urine     CULTURE, URINE [[id number]] Collected: 22 1933    Order Status: Completed Specimen: Urine Updated: 22 0637     Special Requests: --        No Special Requests  Reflexed from [id number]       [place] Count 80,000        [place] Count colonies/ml        Culture result:       Mixed urogenital lily isolated                     Diagnostics   CXR Results  (Last 48 hours)      None            Assessment/Plan     UTI, complicated by nephrolithiasis per CT abdo/pel on 07/21        Abnormal UA, mixed lily isolated from urine Cx         Remains afebrile, WBC normalized on todays labs         Continue on Zosyn while hospitalized, transition to Levaquin upon d/c to complete 7 days of therapy         Routine labs in the morning        2. Chronic venous stasis involving b/l legs Wounds      Alcaligenes faecalis isolated from wound Cx on 07/12 (unclear if R or L)      Continue on Zosyn while hospitalized, d/c on Augmentin when ready     3. Back pain, DJD w no evidence of discitis, on CT of lumbar spine       No need for long term antibiotics, ortho rec out pt follow up      4. Right knee pain: No acute findings noted on X-ray, old quadriceps tendon injury noted      5.  H/o chronic pain syndrome, mgt per primary     Stephen Hernandez MD    7/28/2022

## 2022-07-29 VITALS
DIASTOLIC BLOOD PRESSURE: 66 MMHG | RESPIRATION RATE: 20 BRPM | SYSTOLIC BLOOD PRESSURE: 117 MMHG | HEIGHT: 71 IN | OXYGEN SATURATION: 96 % | HEART RATE: 85 BPM | BODY MASS INDEX: 44.1 KG/M2 | WEIGHT: 315 LBS | TEMPERATURE: 98.3 F

## 2022-07-29 LAB
GLUCOSE BLD STRIP.AUTO-MCNC: 119 MG/DL (ref 65–117)
GLUCOSE BLD STRIP.AUTO-MCNC: 122 MG/DL (ref 65–117)
GLUCOSE BLD STRIP.AUTO-MCNC: 147 MG/DL (ref 65–117)
GLUCOSE BLD STRIP.AUTO-MCNC: 195 MG/DL (ref 65–117)
PERFORMED BY, TECHID: ABNORMAL

## 2022-07-29 PROCEDURE — 77010033678 HC OXYGEN DAILY

## 2022-07-29 PROCEDURE — 74011250636 HC RX REV CODE- 250/636: Performed by: INTERNAL MEDICINE

## 2022-07-29 PROCEDURE — 74011250636 HC RX REV CODE- 250/636: Performed by: PHYSICIAN ASSISTANT

## 2022-07-29 PROCEDURE — 74011636637 HC RX REV CODE- 636/637: Performed by: FAMILY MEDICINE

## 2022-07-29 PROCEDURE — 94762 N-INVAS EAR/PLS OXIMTRY CONT: CPT

## 2022-07-29 PROCEDURE — 74011250637 HC RX REV CODE- 250/637: Performed by: PHYSICIAN ASSISTANT

## 2022-07-29 PROCEDURE — 74011250637 HC RX REV CODE- 250/637: Performed by: FAMILY MEDICINE

## 2022-07-29 PROCEDURE — 74011000258 HC RX REV CODE- 258: Performed by: INTERNAL MEDICINE

## 2022-07-29 PROCEDURE — 94761 N-INVAS EAR/PLS OXIMETRY MLT: CPT

## 2022-07-29 PROCEDURE — 82962 GLUCOSE BLOOD TEST: CPT

## 2022-07-29 RX ADMIN — OXYCODONE HYDROCHLORIDE AND ACETAMINOPHEN 1000 MG: 500 TABLET ORAL at 09:13

## 2022-07-29 RX ADMIN — FLUTICASONE PROPIONATE 2 SPRAY: 50 SPRAY, METERED NASAL at 09:16

## 2022-07-29 RX ADMIN — GABAPENTIN 300 MG: 300 CAPSULE ORAL at 09:13

## 2022-07-29 RX ADMIN — Medication 1 CAPSULE: at 09:22

## 2022-07-29 RX ADMIN — OXYCODONE 15 MG: 5 TABLET ORAL at 09:12

## 2022-07-29 RX ADMIN — OXYCODONE 15 MG: 5 TABLET ORAL at 12:54

## 2022-07-29 RX ADMIN — HYDROMORPHONE HYDROCHLORIDE 1 MG: 1 INJECTION, SOLUTION INTRAMUSCULAR; INTRAVENOUS; SUBCUTANEOUS at 06:46

## 2022-07-29 RX ADMIN — ACETAMINOPHEN 1000 MG: 500 TABLET ORAL at 06:41

## 2022-07-29 RX ADMIN — TIZANIDINE 2 MG: 2 TABLET ORAL at 09:13

## 2022-07-29 RX ADMIN — FERROUS SULFATE TAB 325 MG (65 MG ELEMENTAL FE) 325 MG: 325 (65 FE) TAB at 09:13

## 2022-07-29 RX ADMIN — TAMSULOSIN HYDROCHLORIDE 0.8 MG: 0.4 CAPSULE ORAL at 09:13

## 2022-07-29 RX ADMIN — PIPERACILLIN AND TAZOBACTAM 3.38 G: 3; .375 INJECTION, POWDER, FOR SOLUTION INTRAVENOUS at 13:02

## 2022-07-29 RX ADMIN — ACETAMINOPHEN 1000 MG: 500 TABLET ORAL at 17:01

## 2022-07-29 RX ADMIN — BUMETANIDE 2 MG: 1 TABLET ORAL at 09:13

## 2022-07-29 RX ADMIN — GABAPENTIN 300 MG: 300 CAPSULE ORAL at 12:54

## 2022-07-29 RX ADMIN — GABAPENTIN 300 MG: 300 CAPSULE ORAL at 17:01

## 2022-07-29 RX ADMIN — TIZANIDINE 2 MG: 2 TABLET ORAL at 17:01

## 2022-07-29 RX ADMIN — INSULIN GLARGINE 64 UNITS: 100 INJECTION, SOLUTION SUBCUTANEOUS at 09:11

## 2022-07-29 RX ADMIN — OXYCODONE 15 MG: 5 TABLET ORAL at 03:07

## 2022-07-29 RX ADMIN — ASPIRIN 81 MG CHEWABLE TABLET 81 MG: 81 TABLET CHEWABLE at 09:13

## 2022-07-29 RX ADMIN — Medication 800 UNITS: at 09:22

## 2022-07-29 RX ADMIN — FERROUS SULFATE TAB 325 MG (65 MG ELEMENTAL FE) 325 MG: 325 (65 FE) TAB at 17:01

## 2022-07-29 RX ADMIN — ACETAMINOPHEN 1000 MG: 500 TABLET ORAL at 12:55

## 2022-07-29 RX ADMIN — PIPERACILLIN AND TAZOBACTAM 3.38 G: 3; .375 INJECTION, POWDER, FOR SOLUTION INTRAVENOUS at 06:41

## 2022-07-29 RX ADMIN — OXYCODONE 15 MG: 5 TABLET ORAL at 19:22

## 2022-07-29 RX ADMIN — COLCHICINE 0.6 MG: 0.6 TABLET, FILM COATED ORAL at 09:13

## 2022-07-29 RX ADMIN — HYDROMORPHONE HYDROCHLORIDE 1 MG: 1 INJECTION, SOLUTION INTRAMUSCULAR; INTRAVENOUS; SUBCUTANEOUS at 14:28

## 2022-07-29 NOTE — DISCHARGE SUMMARY
Discharge Summary       PATIENT ID: Marina Vaughn  MRN: [id number]   YOB: 1955    DATE OF ADMISSION: 7/25/2022  6:36 PM    DATE OF DISCHARGE:   PRIMARY CARE PROVIDER: Ella Tatum MD     ATTENDING PHYSICIAN: Jimena Patel  DISCHARGING PROVIDER: Jimena Patel      CONSULTATIONS: IP CONSULT TO ORTHOPEDIC SURGERY  IP CONSULT TO INFECTIOUS DISEASES  IP CONSULT TO PODIATRY    PROCEDURES/SURGERIES: * No surgery found *    ADMITTING DIAGNOSES:    Patient Active Problem List    Diagnosis Date Noted    UTI (urinary tract infection) 07/25/2022    Back pain 07/25/2022    MAURICIO (acute kidney injury) ([address] 07/11/2022    Non-pressure chronic ulcer of other part of right lower leg with fat layer exposed ([address] 06/15/2022    Calf ulcer, left, with fat layer exposed ([address] 04/06/2022    Ankle ulcer, right, with fat layer exposed ([address] 04/06/2022    Ulcer of right heel, with fat layer exposed ([address] 03/23/2022    Ulcer of left foot, with fat layer exposed ([address] 03/09/2022    Non-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 ([address] 03/02/2022    Venous ulcer ([address] 06/17/2021    Cellulitis and abscess of right leg 06/02/2021    Cellulitis 06/01/2021    COVID-19 01/04/2021    Type 2 diabetes mellitus with diabetic polyneuropathy, with long-term current use of insulin ([address] 11/23/2020    Hyperkeratosis 11/23/2020    Onychomycosis 11/23/2020    Localized edema 09/21/2020    Idiopathic chronic venous hypertension of left lower extremity with ulcer ([address] 09/14/2020       DISCHARGE DIAGNOSES / PLAN:      UTI  DDD  morbid obesity  DM  HTN  CHF  COPD  Gout  Sleep apnea  Chronic venous stasis ulcers  L5 discectomy  Pacemaker (MRI compatible)  Protein-calorie malnutrition        DISCHARGE MEDICATIONS:  Current Discharge Medication List        START taking these medications    Details   insulin glargine (LANTUS) 100 unit/mL injection 27-unit subcu at bedtime  Qty: 1 mL, Refills: 2  Start date: 7/28/2022      ciprofloxacin HCl (Cipro) 500 mg tablet Take 1 Tablet by mouth two (2) times a day. Qty: 20 Tablet, Refills: 0  Start date: 7/28/2022           CONTINUE these medications which have CHANGED    Details   tiZANidine (ZANAFLEX) 2 mg tablet Take 1 Tablet by mouth three (3) times daily for 5 days. Qty: 15 Tablet, Refills: 0  Start date: 7/28/2022, End date: 8/2/2022           CONTINUE these medications which have NOT CHANGED    Details   insulin glargine,hum.rec.anlog (TOUJEO MAX U-300 SOLOSTAR SC) 80 Units by SubCUTAneous route two (2) times a day. 80 units in am, 34 units in pm      b complex vitamins tablet Take 1 Tablet by mouth daily. ascorbic acid, vitamin C, (VITAMIN C) 500 mg tablet Take 1,000 mg by mouth two (2) times a day. Indications: inadequate vitamin C      tamsulosin (FLOMAX) 0.4 mg capsule Take 0.8 mg by mouth daily. tiotropium (SPIRIVA) 18 mcg inhalation capsule Take 1 Capsule by inhalation daily. bumetanide (BUMEX) 2 mg tablet Take 2 mg by mouth two (2) times a day. ferrous sulfate (Iron) 325 mg (65 mg iron) tablet Take  by mouth two (2) times daily (after meals). aspirin 81 mg chewable tablet Take 81 mg by mouth daily. flunisolide (NASAREL) 25 mcg (0.025 %) spry 2 Sprays two (2) times a day. albuterol (PROVENTIL HFA, VENTOLIN HFA, PROAIR HFA) 90 mcg/actuation inhaler Take 1 Puff by inhalation every four (4) hours as needed for Wheezing or Shortness of Breath. colchicine 0.6 mg tablet Take 0.6 mg by mouth daily. gabapentin (NEURONTIN) 300 mg capsule Take 300 mg by mouth four (4) times daily. insulin lispro (HUMALOG) 100 unit/mL injection by SubCUTAneous route. Sliding scale      ammonium lactate (AMLACTIN) 12 % topical cream Apply  to affected area as needed for Other (xerosis).  rub in to affected area well  Indications: dry skin  Qty: 280 g, Refills: 0      vitamin E, dl,tocopheryl acet, (vitamin E acetate) 400 unit capsule Take 2 Capsules by mouth two (2) times a day. Qty: 100 Capsule, Refills: 0           STOP taking these medications       vitamin E (AQUA GEMS) 400 unit capsule Comments:   Reason for Stopping:         oxyCODONE IR (ROXICODONE) 5 mg immediate release tablet Comments:   Reason for Stopping:                 NOTIFY YOUR PHYSICIAN FOR ANY OF THE FOLLOWING:   Fever over 101 degrees for 24 hours. Chest pain, shortness of breath, fever, chills, nausea, vomiting, diarrhea, change in mentation, falling, weakness, bleeding. Severe pain or pain not relieved by medications. Or, any other signs or symptoms that you may have questions about. DISPOSITION:  x  Home With:   OT  PT  HH  RN       Long term SNF/Inpatient Rehab    Independent/assisted living    Hospice    Other:       PATIENT CONDITION AT DISCHARGE: Stable      PHYSICAL EXAMINATION AT DISCHARGE:  General:          Alert, cooperative, no distress, appears stated age. HEENT:           Atraumatic, anicteric sclerae, pink conjunctivae                          No oral ulcers, mucosa moist, throat clear, dentition fair  Neck:               Supple, symmetrical  Lungs:             Clear to auscultation bilaterally. No Wheezing or Rhonchi. No rales. Chest wall:      No tenderness  No Accessory muscle use. Heart:              Regular  rhythm,  No  murmur   No edema  Abdomen:        Soft, non-tender. Not distended. Bowel sounds normal  Extremities:     No cyanosis. No clubbing,                            Skin turgor normal, Capillary refill normal  Skin:                Not pale. Not Jaundiced  No rashes   Psych:             Not anxious or agitated. Neurologic:      Alert, moves all extremities, answers questions appropriately and responds to commands     CT SPINE LUMB WO CONT   Final Result   Multilevel degenerative change detailed by level above most significant at L4-L5            XR HIPS BI W PELV 3 OR 4 VWS   Final Result   No acute abnormality seen. XR KNEE RT MAX 2 VWS   Final Result   No acute fracture or dislocation. There is chronic appearing   cortical irregularity at the upper pole of the patella which may be related to   old quadriceps tendon injury. CT ABD PELV WO CONT   Final Result      1. Bilateral nonobstructing renal stones. No hydronephrosis   2. Equivocal nodularity of the liver to suggest cirrhosis           Recent Results (from the past 24 hour(s))   GLUCOSE, POC    Collection Time: 07/28/22  5:11 PM   Result Value Ref Range    Glucose (POC) 110 65 - 117 mg/dL    Performed by [address] [place] [address], POC    Collection Time: 07/28/22  8:25 PM   Result Value Ref Range    Glucose (POC) 141 (H) 65 - 117 mg/dL    Performed by Cheng Laguna, POC    Collection Time: 07/28/22 10:42 PM   Result Value Ref Range    Glucose (POC) 160 (H) 65 - 117 mg/dL    Performed by Marques Huntley ([place])    GLUCOSE, POC    Collection Time: 07/29/22  8:35 AM   Result Value Ref Range    Glucose (POC) 119 (H) 65 - 117 mg/dL    Performed by Kait Alvarado, POC    Collection Time: 07/29/22 11:55 AM   Result Value Ref Range    Glucose (POC) 122 (H) 65 - 117 mg/dL    Performed by [address]:  Patient is a 78y.o. year old male with a significant PMH of morbid obesity, DM, HTN, CHF, COPD, gout, sleep apnea chronic venous stasis ulcers more in the left leg than right CAD, hx of L5 discectomy and recent MAURICIO presented to the ED for low back pain for the past three weeks. The patient says that the pain is in his lower back and radiates to his right flank and down his left leg. The pain is aggravated by movement of the right leg and he rates it as a 10/10. The pain improves when he lays still although he still has occasional shooting pain. Patient has current constipation. Patient denies chest pain, cough, SOB, changes in vision.      WBC 13.5k  BNP 1465  CRP 10.6  Albumin 2.5  >100 WBCs on UA w/ LE 7/27  Patient laying in bed stating his condition has not improved. NAD  Blood glucose 80 this morning. Will recheck after breakfast.  Albumin 2.5  Knee x-ray: No acute fracture or dislocation  CT Lumbar spine ordered for today  Blood sugars running low  At home he take Lantus 80 units subcu daily     7/28  Pt sitting in bed, NAD. States his pain radiates down both of his legs and complains of constipation  Pt states he has a history of urinary stress incontinence which is unchanged. Denies bowel incontinence. Pt on 3 L medical air  VSS     CT LUMBAR SPINE WITHOUT  CONTRAST  IMPRESSION  Multilevel degenerative change detailed by level above most significant at L4-L5     XR HIPS BI W PELV 3 OR 4 VWS  No acute abnormality seen. Urine culture: Mixed urogenital lily isolated     If cleared by the orthopedics patient can discharge to rehab    Medication reconciliation done follow-up with orthopedics in [address] in 1 to 2 weeks    Patient follow-up with the urology as an outpatient    7/29  Pt sitting in bed, NAD. Pt states he feels the same and he cannot walk.   Discharge plans for SNF      Signed:   Regine Iyer MD  7/29/2022  11:50 AM

## 2022-07-29 NOTE — DISCHARGE SUMMARY
Discharge Summary       PATIENT ID: Sue Ryder  MRN: [id number]   YOB: 1955    DATE OF ADMISSION: 7/25/2022  6:36 PM    DATE OF DISCHARGE:   PRIMARY CARE PROVIDER: Daniel Evangelista MD     ATTENDING PHYSICIAN: Meghna Patel  DISCHARGING PROVIDER: Meghna Patel      CONSULTATIONS: IP CONSULT TO ORTHOPEDIC SURGERY  IP CONSULT TO INFECTIOUS DISEASES  IP CONSULT TO PODIATRY    PROCEDURES/SURGERIES: * No surgery found *    ADMITTING DIAGNOSES:    Patient Active Problem List    Diagnosis Date Noted    UTI (urinary tract infection) 07/25/2022    Back pain 07/25/2022    MAURICIO (acute kidney injury) ([address] 07/11/2022    Non-pressure chronic ulcer of other part of right lower leg with fat layer exposed ([address] 06/15/2022    Calf ulcer, left, with fat layer exposed ([address] 04/06/2022    Ankle ulcer, right, with fat layer exposed ([address] 04/06/2022    Ulcer of right heel, with fat layer exposed ([address] 03/23/2022    Ulcer of left foot, with fat layer exposed ([address] 03/09/2022    Non-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 ([address] 03/02/2022    Venous ulcer ([address] 06/17/2021    Cellulitis and abscess of right leg 06/02/2021    Cellulitis 06/01/2021    COVID-19 01/04/2021    Type 2 diabetes mellitus with diabetic polyneuropathy, with long-term current use of insulin ([address] 11/23/2020    Hyperkeratosis 11/23/2020    Onychomycosis 11/23/2020    Localized edema 09/21/2020    Idiopathic chronic venous hypertension of left lower extremity with ulcer ([address] 09/14/2020       DISCHARGE DIAGNOSES / PLAN:      UTI  DDD  morbid obesity  DM  HTN  CHF  COPD  Gout  Sleep apnea  Chronic venous stasis ulcers  L5 discectomy  Pacemaker (MRI compatible)  Protein-calorie malnutrition        DISCHARGE MEDICATIONS:  Current Discharge Medication List        START taking these medications    Details   insulin glargine (LANTUS) 100 unit/mL injection 27-unit subcu at bedtime  Qty: 1 mL, Refills: 2  Start date: 7/28/2022      ciprofloxacin HCl (Cipro) 500 mg tablet Take 1 Tablet by mouth two (2) times a day. Qty: 20 Tablet, Refills: 0  Start date: 7/28/2022           CONTINUE these medications which have CHANGED    Details   tiZANidine (ZANAFLEX) 2 mg tablet Take 1 Tablet by mouth three (3) times daily for 5 days. Qty: 15 Tablet, Refills: 0  Start date: 7/28/2022, End date: 8/2/2022           CONTINUE these medications which have NOT CHANGED    Details   insulin glargine,hum.rec.anlog (TOUJEO MAX U-300 SOLOSTAR SC) 80 Units by SubCUTAneous route two (2) times a day. 80 units in am, 34 units in pm      b complex vitamins tablet Take 1 Tablet by mouth daily. ascorbic acid, vitamin C, (VITAMIN C) 500 mg tablet Take 1,000 mg by mouth two (2) times a day. Indications: inadequate vitamin C      tamsulosin (FLOMAX) 0.4 mg capsule Take 0.8 mg by mouth daily. tiotropium (SPIRIVA) 18 mcg inhalation capsule Take 1 Capsule by inhalation daily. bumetanide (BUMEX) 2 mg tablet Take 2 mg by mouth two (2) times a day. ferrous sulfate (Iron) 325 mg (65 mg iron) tablet Take  by mouth two (2) times daily (after meals). aspirin 81 mg chewable tablet Take 81 mg by mouth daily. flunisolide (NASAREL) 25 mcg (0.025 %) spry 2 Sprays two (2) times a day. albuterol (PROVENTIL HFA, VENTOLIN HFA, PROAIR HFA) 90 mcg/actuation inhaler Take 1 Puff by inhalation every four (4) hours as needed for Wheezing or Shortness of Breath. colchicine 0.6 mg tablet Take 0.6 mg by mouth daily. gabapentin (NEURONTIN) 300 mg capsule Take 300 mg by mouth four (4) times daily. oxyCODONE IR (ROXICODONE) 5 mg immediate release tablet Take 5 mg by mouth every twelve (12) hours. insulin lispro (HUMALOG) 100 unit/mL injection by SubCUTAneous route. Sliding scale      ammonium lactate (AMLACTIN) 12 % topical cream Apply  to affected area as needed for Other (xerosis).  rub in to affected area well  Indications: dry skin  Qty: 280 g, Refills: 0      vitamin E, dl,tocopheryl acet, (vitamin E acetate) 400 unit capsule Take 2 Capsules by mouth two (2) times a day. Qty: 100 Capsule, Refills: 0           STOP taking these medications       vitamin E (AQUA GEMS) 400 unit capsule Comments:   Reason for Stopping:                 NOTIFY YOUR PHYSICIAN FOR ANY OF THE FOLLOWING:   Fever over 101 degrees for 24 hours. Chest pain, shortness of breath, fever, chills, nausea, vomiting, diarrhea, change in mentation, falling, weakness, bleeding. Severe pain or pain not relieved by medications. Or, any other signs or symptoms that you may have questions about. DISPOSITION:  x  Home With:   OT  PT  HH  RN       Long term SNF/Inpatient Rehab    Independent/assisted living    Hospice    Other:       PATIENT CONDITION AT DISCHARGE: Stable      PHYSICAL EXAMINATION AT DISCHARGE:  General:          Alert, cooperative, no distress, appears stated age. HEENT:           Atraumatic, anicteric sclerae, pink conjunctivae                          No oral ulcers, mucosa moist, throat clear, dentition fair  Neck:               Supple, symmetrical  Lungs:             Clear to auscultation bilaterally. No Wheezing or Rhonchi. No rales. Chest wall:      No tenderness  No Accessory muscle use. Heart:              Regular  rhythm,  No  murmur   No edema  Abdomen:        Soft, non-tender. Not distended. Bowel sounds normal  Extremities:     No cyanosis. No clubbing,                            Skin turgor normal, Capillary refill normal  Skin:                Not pale. Not Jaundiced  No rashes   Psych:             Not anxious or agitated. Neurologic:      Alert, moves all extremities, answers questions appropriately and responds to commands     CT SPINE LUMB WO CONT   Final Result   Multilevel degenerative change detailed by level above most significant at L4-L5            XR HIPS BI W PELV 3 OR 4 VWS   Final Result   No acute abnormality seen. XR KNEE RT MAX 2 VWS   Final Result   No acute fracture or dislocation. There is chronic appearing   cortical irregularity at the upper pole of the patella which may be related to   old quadriceps tendon injury. CT ABD PELV WO CONT   Final Result      1. Bilateral nonobstructing renal stones. No hydronephrosis   2. Equivocal nodularity of the liver to suggest cirrhosis           Recent Results (from the past 24 hour(s))   GLUCOSE, POC    Collection Time: 07/28/22 11:52 AM   Result Value Ref Range    Glucose (POC) 191 (H) 65 - 117 mg/dL    Performed by Fabiola Arnett, POC    Collection Time: 07/28/22  5:11 PM   Result Value Ref Range    Glucose (POC) 110 65 - 117 mg/dL    Performed by [address] [place] [address], POC    Collection Time: 07/28/22  8:25 PM   Result Value Ref Range    Glucose (POC) 141 (H) 65 - 117 mg/dL    Performed by Favian Paz, POC    Collection Time: 07/28/22 10:42 PM   Result Value Ref Range    Glucose (POC) 160 (H) 65 - 117 mg/dL    Performed by Bobbi Fine ([place])    GLUCOSE, POC    Collection Time: 07/29/22  8:35 AM   Result Value Ref Range    Glucose (POC) 119 (H) 65 - 117 mg/dL    Performed by [address]:  Patient is a 78y.o. year old male with a significant PMH of morbid obesity, DM, HTN, CHF, COPD, gout, sleep apnea chronic venous stasis ulcers more in the left leg than right CAD, hx of L5 discectomy and recent MAURICIO presented to the ED for low back pain for the past three weeks. The patient says that the pain is in his lower back and radiates to his right flank and down his left leg. The pain is aggravated by movement of the right leg and he rates it as a 10/10. The pain improves when he lays still although he still has occasional shooting pain. Patient has current constipation. Patient denies chest pain, cough, SOB, changes in vision.      WBC 13.5k  BNP 1465  CRP 10.6  Albumin 2.5  >100 WBCs on UA w/ LE 7/27  Patient laying in bed stating his condition has not improved. NAD  Blood glucose 80 this morning. Will recheck after breakfast.  Albumin 2.5  Knee x-ray: No acute fracture or dislocation  CT Lumbar spine ordered for today  Blood sugars running low  At home he take Lantus 80 units subcu daily     7/28  Pt sitting in bed, NAD. States his pain radiates down both of his legs and complains of constipation  Pt states he has a history of urinary stress incontinence which is unchanged. Denies bowel incontinence. Pt on 3 L medical air  VSS     CT LUMBAR SPINE WITHOUT  CONTRAST  IMPRESSION  Multilevel degenerative change detailed by level above most significant at L4-L5     XR HIPS BI W PELV 3 OR 4 VWS  No acute abnormality seen. Urine culture: Mixed urogenital lily isolated     If cleared by the orthopedics patient can discharge to rehab    Medication reconciliation done follow-up with orthopedics in [address] in 1 to 2 weeks    Patient follow-up with the urology as an outpatient    7/29  Pt sitting in bed, NAD. Pt states he feels the same and he cannot walk.   Discharge plans for SNF      Signed:   Lubna Shirley  7/29/2022  11:50 AM

## 2022-07-29 NOTE — PROGRESS NOTES
CM reviewed chart. Spoke with patient, is cleared for discharge waiting for facility to accept patient. Have several that will take patient but patient requesting private room. Waiting to see if [facility] will have private room for patient.

## 2022-07-29 NOTE — PROGRESS NOTES
Patient discharging to [facility] and rehab today going into room 104. Patient aware and gave info to patient to relay to wife. Nurse to call report at [phone]. Transport will need to be setup. Discharge plan of care/case management plan validated with provider discharge order. Medicare pt has received, reviewed, and signed 2nd IM letter informing them of their right to appeal the discharge. Signed copied has been placed on pt bedside chart.

## 2022-07-30 NOTE — PROGRESS NOTES
Discharge plan of care/case management plan validated with provider discharge order. Patient discharged via [facility] [address] transport, stretcher. VSS, all belongings sent with patient.

## 2022-08-01 LAB
BACTERIA SPEC CULT: NORMAL
BACTERIA SPEC CULT: NORMAL
SPECIAL REQUESTS,SREQ: NORMAL
SPECIAL REQUESTS,SREQ: NORMAL

## 2022-08-24 PROBLEM — N39.0 UTI (URINARY TRACT INFECTION): Status: RESOLVED | Noted: 2022-07-25 | Resolved: 2022-08-24

## 2022-09-21 ENCOUNTER — HOSPITAL ENCOUNTER (OUTPATIENT)
Dept: WOUND CARE | Age: 67
Discharge: HOME | End: 2022-09-21
Payer: MEDICARE

## 2022-09-21 VITALS
RESPIRATION RATE: 22 BRPM | HEART RATE: 98 BPM | TEMPERATURE: 98 F | OXYGEN SATURATION: 98 % | DIASTOLIC BLOOD PRESSURE: 66 MMHG | SYSTOLIC BLOOD PRESSURE: 113 MMHG

## 2022-09-21 DIAGNOSIS — L97.412 ULCER OF RIGHT HEEL, WITH FAT LAYER EXPOSED (HCC): ICD-10-CM

## 2022-09-21 DIAGNOSIS — L97.822 NON-PRESSURE CHRONIC ULCER OF OTHER PART OF LEFT LOWER LEG WITH FAT LAYER EXPOSED (HCC): ICD-10-CM

## 2022-09-21 DIAGNOSIS — L97.929 IDIOPATHIC CHRONIC VENOUS HYPERTENSION OF LEFT LOWER EXTREMITY WITH ULCER (HCC): Primary | ICD-10-CM

## 2022-09-21 DIAGNOSIS — L97.812 NON-PRESSURE CHRONIC ULCER OF OTHER PART OF RIGHT LOWER LEG WITH FAT LAYER EXPOSED (HCC): ICD-10-CM

## 2022-09-21 DIAGNOSIS — R60.0 LOCALIZED EDEMA: ICD-10-CM

## 2022-09-21 DIAGNOSIS — I87.312 IDIOPATHIC CHRONIC VENOUS HYPERTENSION OF LEFT LOWER EXTREMITY WITH ULCER (HCC): Primary | ICD-10-CM

## 2022-09-21 DIAGNOSIS — L97.522 ULCER OF LEFT FOOT, WITH FAT LAYER EXPOSED (HCC): ICD-10-CM

## 2022-09-21 PROCEDURE — 29581 APPL MULTLAYER CMPRN SYS LEG: CPT

## 2022-09-21 PROCEDURE — 74011000250 HC RX REV CODE- 250: Performed by: SPECIALIST

## 2022-09-21 RX ORDER — LIDOCAINE HYDROCHLORIDE 20 MG/ML
15 SOLUTION OROPHARYNGEAL ONCE
Status: COMPLETED | OUTPATIENT
Start: 2022-09-21 | End: 2022-09-21

## 2022-09-21 RX ORDER — SILVER SULFADIAZINE 10 G/1000G
CREAM TOPICAL ONCE
Status: CANCELLED | OUTPATIENT
Start: 2022-09-21 | End: 2022-09-21

## 2022-09-21 RX ORDER — MUPIROCIN 20 MG/G
OINTMENT TOPICAL ONCE
Status: CANCELLED | OUTPATIENT
Start: 2022-09-21 | End: 2022-09-21

## 2022-09-21 RX ORDER — LIDOCAINE HYDROCHLORIDE 20 MG/ML
15 SOLUTION OROPHARYNGEAL ONCE
Status: CANCELLED | OUTPATIENT
Start: 2022-09-21 | End: 2022-09-21

## 2022-09-21 RX ADMIN — LIDOCAINE HYDROCHLORIDE 15 ML: 20 SOLUTION ORAL; TOPICAL at 13:44

## 2022-09-21 NOTE — DISCHARGE INSTRUCTIONS
Discharge Instructions  [address] [place] [address], [address] [place] [address]  Telephone: [phone]    NAME:  Sol Ernandezast OF BIRTH:  1955  MEDICAL RECORD NUMBER:  [id number]  DATE: 9/21/2022       Return Appointment:  [] Dressing supply provider:   [x] Home Healthcare: [address] [facility]  [x] Return Appointment: 1 Week(s)  [] Nurse Visit:       [] Discharge from [facility]  [] Ordered tests:    [] Referrals:                         [] Rx:        Wound Cleansing:   Do not scrub or use excessive force. Cleanse wound prior to applying a clean dressing with:  [] Normal Saline   [x] Mild Soap & Water/Baby Shampoo    [] Keep Wound Dry in Shower  [] Other:      Topical Treatments:  Do not apply lotions, creams, or ointments to wound bed unless directed. [x] Apply moisturizing lotion to skin surrounding the wound prior to dressing change.  [] Apply antifungal ointment to skin surrounding the wound prior to dressing change.  [] Apply thin film of moisture barrier ointment to skin immediately around wound. [] Other:  Betadine to israel-wound     Dressings:           Wound Location L leg   [x] Apply Primary Dressing:       [] MediHoney Gel    [] MediHoney Alginate               [] Calcium Alginate      [x] Calcium Alginate with silver- Silvercel   [] Collagen                   [] Collagen with Silver                [] Santyl with Moistened saline gauze              [] Hydrofera Blue (cut to size and moistened)  [] Hydrofera Blue Ready (Cut to size)   [] NS wet to dry    [] Betadine wet to dry              [] Hydrogel                [] Mepitel     [] Bactroban/Mupirocin               [] Other:      [x] Cover and Secure with:     [x] Gauze [] Vickki Daysi [] Kerlix   [] Foam [] Super Absorbant [x] ABD     [] ACE Wrap            [] Other:    Avoid contact of tape with skin.     [x] Change dressing: [] Daily    [] Every Other Day [] Once per week   [] Twice per week   [x] Three times per week [] Do Not Change Dressing   [] Other:      Compression:  Apply: [x] Multilayer Compression Wrap: [x]RightLeg [x]Left Leg                                 []Profore   [x] profore lite          []Unna     [x] Do not get leg(s) with wrap wet. If wraps become too tight call the center or completely remove the wrap. [x] Elevate leg(s) above the level of the heart when sitting. [x] Avoid prolonged standing in one place. Dietary:  [] Diet as tolerated: [x] Calorie Diabetic Diet: [x] No Added Salt:  [x] Increase Protein: [] Other:     Activity:  [x] Activity as tolerated:    [] Patient has no activity restrictions       [] Strict Bedrest:   [] Remain off Work:       [] May return to full duty work:                                     [] Return to work with restrictions:               [address] [facility] Information: Should you experience any significant changes in your wound(s) or have questions about your wound care, please contact Thais Caldera at [place] [address] 8:00 am - 4:30. If you need help with your wound outside of these hours and cannot wait until we are again available, contact your PCP or go to the hospital emergency room. PLEASE NOTE: IF YOU ARE UNABLE TO OBTAIN WOUND SUPPLIES, CONTINUE TO USE THE SUPPLIES YOU HAVE AVAILABLE UNTIL YOU ARE ABLE TO REACH US. IT IS MOST IMPORTANT TO KEEP THE WOUND COVERED AT ALL TIMES.     [x] Dr. Adrianna Diaz   [] Dr. Prabha Lennon  [] Dr. Chris Small

## 2022-09-21 NOTE — WOUND CARE
Multilayer Compression Wrap   (Not Unna) Below the Knee    NAME:  Elena Paul OF BIRTH:  1955  MEDICAL RECORD NUMBER:  [id number]  DATE:  9/21/2022    Removed old Multilayer wrap if indicated and wash leg with mild soap/water. Applied primary and secondary dressing as ordered. Applied multilayered dressing below the knee to right lower leg. Applied multilayered dressing below the knee to left lower leg. Instructed patient/caregiver not to remove dressing and to keep it clean and dry. Instructed patient/caregiver on complications to report to provider, such as pain, numbness in toes, heavy drainage, and slippage of dressing. Instructed patient on purpose of compression dressing and on activity and exercise recommendations.     Response to treatment: Well tolerated by patient       Electronically signed by Shilpi Vides RN on 9/21/2022 at 2:13 PM

## 2022-09-21 NOTE — WOUND CARE
[address]   Progress Note and Procedure Note   NO Procedure      [address] RECORD NUMBER:  [id number]  AGE: 78 y.o. RACE WHITE/NON-  GENDER: male  : 1955  EPISODE DATE:  2022    Subjective:   Patient's been recently hospitalized for multiple medical problems  During hospitalization, his leg situation is improved dramatically    Chief Complaint   Patient presents with    Wound Check     R heel, L leg         HISTORY of PRESENT ILLNESS HPI    Kwadwo Romero is a 78 y.o. male who presents today for wound/ulcer evaluation.    History of Wound Context: LLE  Wound/Ulcer Pain Timing/Severity: mild  Quality of pain: dull  Severity:  1 / 10   Modifying Factors: None  Associated Signs/Symptoms: edema    Ulcer Identification:  Ulcer Type: venous    Contributing Factors: lymphedema    Wound: LLE         PAST MEDICAL HISTORY    Past Medical History:   Diagnosis Date    Arthritis     CAD (coronary artery disease)     Chronic obstructive pulmonary disease (HCC)     Diabetes (HCC)     Gout     Hypertension     Ill-defined condition     Sleep apnea         PAST SURGICAL HISTORY    Past Surgical History:   Procedure Laterality Date    HX ORTHOPAEDIC      HX PACEMAKER      HX VEIN ABLATION ADHESIVE         FAMILY HISTORY    Family History   Problem Relation Age of Onset    Heart Disease Mother     Kidney Disease Mother     Hypertension Mother     Diabetes Mother     Heart Disease Father     Hypertension Father     Diabetes Sister     Diabetes Brother        SOCIAL HISTORY    Social History     Tobacco Use    Smoking status: Never    Smokeless tobacco: Never   Vaping Use    Vaping Use: Never used   Substance Use Topics    Alcohol use: Not Currently    Drug use: Never       ALLERGIES    Allergies   Allergen Reactions    Elavil Angioedema    Naproxen Unknown (comments)     Pt not sure of reaction    Sulfa (Sulfonamide Antibiotics) Nausea and Vomiting MEDICATIONS    Current Outpatient Medications on File Prior to Encounter   Medication Sig Dispense Refill    insulin glargine (LANTUS) 100 unit/mL injection 27-unit subcu at bedtime 1 mL 2    insulin glargine,hum.rec.anlog (TOUJEO MAX U-300 SOLOSTAR SC) 80 Units by SubCUTAneous route two (2) times a day. 80 units in am, 34 units in pm      b complex vitamins tablet Take 1 Tablet by mouth daily. ammonium lactate (AMLACTIN) 12 % topical cream Apply  to affected area as needed for Other (xerosis). rub in to affected area well  Indications: dry skin 280 g 0    vitamin E, dl,tocopheryl acet, (vitamin E acetate) 400 unit capsule Take 2 Capsules by mouth two (2) times a day. 100 Capsule 0    ascorbic acid, vitamin C, (VITAMIN C) 500 mg tablet Take 1,000 mg by mouth two (2) times a day. Indications: inadequate vitamin C      tamsulosin (FLOMAX) 0.4 mg capsule Take 0.8 mg by mouth daily. tiotropium (SPIRIVA) 18 mcg inhalation capsule Take 1 Capsule by inhalation daily. bumetanide (BUMEX) 2 mg tablet Take 2 mg by mouth two (2) times a day. ferrous sulfate (Iron) 325 mg (65 mg iron) tablet Take  by mouth two (2) times daily (after meals). aspirin 81 mg chewable tablet Take 81 mg by mouth daily. flunisolide (NASAREL) 25 mcg (0.025 %) spry 2 Sprays two (2) times a day. albuterol (PROVENTIL HFA, VENTOLIN HFA, PROAIR HFA) 90 mcg/actuation inhaler Take 1 Puff by inhalation every four (4) hours as needed for Wheezing or Shortness of Breath. colchicine 0.6 mg tablet Take 0.6 mg by mouth daily. gabapentin (NEURONTIN) 300 mg capsule Take 300 mg by mouth four (4) times daily. insulin lispro (HUMALOG) 100 unit/mL injection by SubCUTAneous route. Sliding scale       No current facility-administered medications on file prior to encounter. REVIEW OF SYSTEMS    Pertinent items are noted in HPI.     Objective:     Visit Vitals  /66 (BP 1 Location: Right arm, BP Patient Position: At rest;Sitting)   Pulse 98   Temp 98 °F (36.7 °C)   Resp 22   SpO2 98%       Wt Readings from Last 3 Encounters:   07/25/22 157.4 kg (347 lb)   07/21/22 157.4 kg (347 lb)   07/11/22 157.4 kg (347 lb)       PHYSICAL EXAM  There is a single remaining wound on the left lower extremity which is small and superficial, healthy granulation tissue, no evidence of active infection  Pertinent items are noted in HPI.     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 (HCC)    Relevant Medications    lidocaine (XYLOCAINE) 2 % viscous solution 15 mL (Completed)    Other Relevant Orders    INITIATE OUTPATIENT WOUND CARE PROTOCOL    Ulcer of left foot, with fat layer exposed ([address]    Relevant Medications    lidocaine (XYLOCAINE) 2 % viscous solution 15 mL (Completed)    Other Relevant Orders    INITIATE OUTPATIENT WOUND CARE PROTOCOL    Ulcer of right heel, with fat layer exposed (HCC)    Relevant Medications    lidocaine (XYLOCAINE) 2 % viscous solution 15 mL (Completed)    Other Relevant Orders    INITIATE OUTPATIENT WOUND CARE PROTOCOL    Non-pressure chronic ulcer of other part of right lower leg with fat layer exposed (HCC)    Relevant Medications    lidocaine (XYLOCAINE) 2 % viscous solution 15 mL (Completed)    Other Relevant Orders    INITIATE OUTPATIENT WOUND CARE PROTOCOL       Procedure Note  Indications:  Based on my examination of this patient's wound(s)/ulcer(s) today, debridement is not required to promote healing and evaluate the wound base. Wound Leg lower Left;Lateral #1 (Active)   Wound Image   09/21/22 1345   Wound Etiology Venous 09/21/22 1345   Dressing Status Clean;Dry; Intact 09/21/22 1345   Cleansed Soap and water 09/21/22 1345   Wound Length (cm) 2.2 cm 09/21/22 1345   Wound Width (cm) 0.7 cm 09/21/22 1345   Wound Depth (cm) 0.2 cm 09/21/22 1345   Wound Surface Area (cm^2) 1.54 cm^2 09/21/22 1345   Change in Wound Size % 98.6 09/21/22 1345   Wound Volume (cm^3) 0.308 cm^3 09/21/22 1345   Wound Healing % 97 09/21/22 1345   Post-Procedure Length (cm) 10.2 cm 07/20/22 1519   Post-Procedure Width (cm) 5 cm 07/20/22 1519   Post-Procedure Depth (cm) 0.1 cm 07/20/22 1519   Post-Procedure Surface Area (cm^2) 51 cm^2 07/20/22 1519   Post-Procedure Volume (cm^3) 5.1 cm^3 07/20/22 1519   Wound Assessment Slough;Granulation tissue 09/21/22 1345   Drainage Amount Moderate 09/21/22 1345   Drainage Description Serosanguinous 09/21/22 1345   Wound Odor None 09/21/22 1345   Alissa-Wound/Incision Assessment Intact;Dry/flaky 09/21/22 1345   Edges Attached edges 09/21/22 1345   Wound Thickness Description Full thickness 09/21/22 1345   Number of days: 203        Plan:   Silver cell to the left leg wound, bilateral Coflex wraps    Treatment Note please see attached Discharge Instructions    Written patient dismissal instructions given to patient or POA.          Electronically signed by Jesus Oviedo MD on 9/21/2022 at 2:13 PM

## 2022-09-21 NOTE — WOUND CARE
Discharge Instructions  [address] [place] [address], [address] [place] [address]  Telephone: [phone]    NAME:  Mason Haas OF BIRTH:  1955  MEDICAL RECORD NUMBER:  [id number]  DATE: 9/21/2022       Return Appointment:  [] Dressing supply provider:   [x] Home Healthcare: [address] [facility]  [x] Return Appointment: 1 Week(s)  [] Nurse Visit:       [] Discharge from [facility]  [] Ordered tests:    [] Referrals:                         [] Rx:        Wound Cleansing:   Do not scrub or use excessive force. Cleanse wound prior to applying a clean dressing with:  [] Normal Saline   [x] Mild Soap & Water/Baby Shampoo    [] Keep Wound Dry in Shower  [] Other:      Topical Treatments:  Do not apply lotions, creams, or ointments to wound bed unless directed. [x] Apply moisturizing lotion to skin surrounding the wound prior to dressing change.  [] Apply antifungal ointment to skin surrounding the wound prior to dressing change.  [] Apply thin film of moisture barrier ointment to skin immediately around wound. [] Other:  Betadine to israel-wound     Dressings:           Wound Location L leg   [x] Apply Primary Dressing:       [] MediHoney Gel    [] MediHoney Alginate               [] Calcium Alginate      [x] Calcium Alginate with silver- Silvercel   [] Collagen                   [] Collagen with Silver                [] Santyl with Moistened saline gauze              [] Hydrofera Blue (cut to size and moistened)  [] Hydrofera Blue Ready (Cut to size)   [] NS wet to dry    [] Betadine wet to dry              [] Hydrogel                [] Mepitel     [] Bactroban/Mupirocin               [] Other:      [x] Cover and Secure with:     [x] Gauze [] Alondra Bunde [] Kerlix   [] Foam [] Super Absorbant [x] ABD     [] ACE Wrap            [] Other:    Avoid contact of tape with skin.     [x] Change dressing: [] Daily    [] Every Other Day [] Once per week   [] Twice per week   [x] Three times per week [] Do Not Change Dressing   [] Other:      Compression:  Apply: [x] Multilayer Compression Wrap: [x]RightLeg [x]Left Leg                                 []Profore   [x] profore lite          []Unna     [x] Do not get leg(s) with wrap wet. If wraps become too tight call the center or completely remove the wrap. [x] Elevate leg(s) above the level of the heart when sitting. [x] Avoid prolonged standing in one place. Dietary:  [] Diet as tolerated: [x] Calorie Diabetic Diet: [x] No Added Salt:  [x] Increase Protein: [] Other:     Activity:  [x] Activity as tolerated:    [] Patient has no activity restrictions       [] Strict Bedrest:   [] Remain off Work:       [] May return to full duty work:                                     [] Return to work with restrictions:               [address] Information: Should you experience any significant changes in your wound(s) or have questions about your wound care, please contact Thais [address] 26 at [place] [address] 8:00 am - 4:30. If you need help with your wound outside of these hours and cannot wait until we are again available, contact your PCP or go to the hospital emergency room. PLEASE NOTE: IF YOU ARE UNABLE TO OBTAIN WOUND SUPPLIES, CONTINUE TO USE THE SUPPLIES YOU HAVE AVAILABLE UNTIL YOU ARE ABLE TO REACH US. IT IS MOST IMPORTANT TO KEEP THE WOUND COVERED AT ALL TIMES.     [x] Dr. Mili Go   [] Dr. David Polanco  [] Dr. Jason Gallego

## 2022-09-28 ENCOUNTER — HOSPITAL ENCOUNTER (OUTPATIENT)
Dept: WOUND CARE | Age: 67
Discharge: HOME | End: 2022-09-28
Payer: MEDICARE

## 2022-09-28 VITALS
RESPIRATION RATE: 22 BRPM | SYSTOLIC BLOOD PRESSURE: 129 MMHG | HEART RATE: 82 BPM | OXYGEN SATURATION: 96 % | DIASTOLIC BLOOD PRESSURE: 77 MMHG | TEMPERATURE: 96.9 F

## 2022-09-28 DIAGNOSIS — L97.812 NON-PRESSURE CHRONIC ULCER OF OTHER PART OF RIGHT LOWER LEG WITH FAT LAYER EXPOSED (HCC): Primary | ICD-10-CM

## 2022-09-28 DIAGNOSIS — L97.929 IDIOPATHIC CHRONIC VENOUS HYPERTENSION OF LEFT LOWER EXTREMITY WITH ULCER (HCC): ICD-10-CM

## 2022-09-28 DIAGNOSIS — R60.0 LOCALIZED EDEMA: ICD-10-CM

## 2022-09-28 DIAGNOSIS — L97.822 NON-PRESSURE CHRONIC ULCER OF OTHER PART OF LEFT LOWER LEG WITH FAT LAYER EXPOSED (HCC): ICD-10-CM

## 2022-09-28 DIAGNOSIS — L97.522 ULCER OF LEFT FOOT, WITH FAT LAYER EXPOSED (HCC): ICD-10-CM

## 2022-09-28 DIAGNOSIS — L97.412 ULCER OF RIGHT HEEL, WITH FAT LAYER EXPOSED (HCC): ICD-10-CM

## 2022-09-28 DIAGNOSIS — I87.312 IDIOPATHIC CHRONIC VENOUS HYPERTENSION OF LEFT LOWER EXTREMITY WITH ULCER (HCC): ICD-10-CM

## 2022-09-28 PROCEDURE — 74011000250 HC RX REV CODE- 250: Performed by: SPECIALIST

## 2022-09-28 PROCEDURE — 99213 OFFICE O/P EST LOW 20 MIN: CPT

## 2022-09-28 RX ORDER — MUPIROCIN 20 MG/G
OINTMENT TOPICAL ONCE
Status: CANCELLED | OUTPATIENT
Start: 2022-09-28 | End: 2022-09-28

## 2022-09-28 RX ORDER — LIDOCAINE HYDROCHLORIDE 20 MG/ML
15 SOLUTION OROPHARYNGEAL ONCE
Status: COMPLETED | OUTPATIENT
Start: 2022-09-28 | End: 2022-09-28

## 2022-09-28 RX ORDER — LIDOCAINE HYDROCHLORIDE 20 MG/ML
15 SOLUTION OROPHARYNGEAL ONCE
Status: CANCELLED | OUTPATIENT
Start: 2022-09-28 | End: 2022-09-28

## 2022-09-28 RX ORDER — SILVER SULFADIAZINE 10 G/1000G
CREAM TOPICAL ONCE
Status: CANCELLED | OUTPATIENT
Start: 2022-09-28 | End: 2022-09-28

## 2022-09-28 RX ADMIN — LIDOCAINE HYDROCHLORIDE 15 ML: 20 SOLUTION ORAL; TOPICAL at 13:57

## 2022-09-28 NOTE — WOUND CARE
[address]   Progress Note and Procedure Note   NO Procedure      [address] RECORD NUMBER:  [id number]  AGE: 78 y.o. RACE WHITE/NON-  GENDER: male  : 1955  EPISODE DATE:  2022    Subjective:   No new problems  Minimal drainage, no fever, mild pain  Chief Complaint   Patient presents with    Wound Check     LEFT LEG         HISTORY of PRESENT ILLNESS HPI    Conrad Messer is a 78 y.o. male who presents today for wound/ulcer evaluation.    History of Wound Context: LLE  Wound/Ulcer Pain Timing/Severity: mild  Quality of pain: dull  Severity:  1 / 10   Modifying Factors: None  Associated Signs/Symptoms: edema    Ulcer Identification:  Ulcer Type: venous    Contributing Factors: edema    Wound: LLE         PAST MEDICAL HISTORY    Past Medical History:   Diagnosis Date    Arthritis     CAD (coronary artery disease)     Chronic obstructive pulmonary disease (HCC)     Diabetes (HCC)     Gout     Hypertension     Ill-defined condition     Sleep apnea         PAST SURGICAL HISTORY    Past Surgical History:   Procedure Laterality Date    HX ORTHOPAEDIC      HX PACEMAKER      HX VEIN ABLATION ADHESIVE         FAMILY HISTORY    Family History   Problem Relation Age of Onset    Heart Disease Mother     Kidney Disease Mother     Hypertension Mother     Diabetes Mother     Heart Disease Father     Hypertension Father     Diabetes Sister     Diabetes Brother        SOCIAL HISTORY    Social History     Tobacco Use    Smoking status: Never    Smokeless tobacco: Never   Vaping Use    Vaping Use: Never used   Substance Use Topics    Alcohol use: Not Currently    Drug use: Never       ALLERGIES    Allergies   Allergen Reactions    Elavil Angioedema    Naproxen Unknown (comments)     Pt not sure of reaction    Sulfa (Sulfonamide Antibiotics) Nausea and Vomiting       MEDICATIONS    Current Outpatient Medications on File Prior to Encounter   Medication Sig Dispense Refill insulin glargine (LANTUS) 100 unit/mL injection 27-unit subcu at bedtime 1 mL 2    insulin glargine,hum.rec.anlog (TOUJEO MAX U-300 SOLOSTAR SC) 80 Units by SubCUTAneous route two (2) times a day. 80 units in am, 34 units in pm      b complex vitamins tablet Take 1 Tablet by mouth daily. ammonium lactate (AMLACTIN) 12 % topical cream Apply  to affected area as needed for Other (xerosis). rub in to affected area well  Indications: dry skin 280 g 0    vitamin E, dl,tocopheryl acet, (vitamin E acetate) 400 unit capsule Take 2 Capsules by mouth two (2) times a day. 100 Capsule 0    ascorbic acid, vitamin C, (VITAMIN C) 500 mg tablet Take 1,000 mg by mouth two (2) times a day. Indications: inadequate vitamin C      tamsulosin (FLOMAX) 0.4 mg capsule Take 0.8 mg by mouth daily. tiotropium (SPIRIVA) 18 mcg inhalation capsule Take 1 Capsule by inhalation daily. bumetanide (BUMEX) 2 mg tablet Take 2 mg by mouth two (2) times a day. ferrous sulfate (Iron) 325 mg (65 mg iron) tablet Take  by mouth two (2) times daily (after meals). aspirin 81 mg chewable tablet Take 81 mg by mouth daily. flunisolide (NASAREL) 25 mcg (0.025 %) spry 2 Sprays two (2) times a day. albuterol (PROVENTIL HFA, VENTOLIN HFA, PROAIR HFA) 90 mcg/actuation inhaler Take 1 Puff by inhalation every four (4) hours as needed for Wheezing or Shortness of Breath. colchicine 0.6 mg tablet Take 0.6 mg by mouth daily. gabapentin (NEURONTIN) 300 mg capsule Take 300 mg by mouth four (4) times daily. insulin lispro (HUMALOG) 100 unit/mL injection by SubCUTAneous route. Sliding scale       No current facility-administered medications on file prior to encounter. REVIEW OF SYSTEMS    Pertinent items are noted in HPI.     Objective:     Visit Vitals  /77 (BP 1 Location: Right arm, BP Patient Position: At rest;Sitting)   Pulse 82   Temp 96.9 °F (36.1 °C)   Resp 22   SpO2 96%       Wt Readings from Last 3 Encounters:   07/25/22 157.4 kg (347 lb)   07/21/22 157.4 kg (347 lb)   07/11/22 157.4 kg (347 lb)       PHYSICAL EXAM  Small, superficial L leg wound, no infection  Pertinent items are noted in HPI. Assessment:    Good progress, almost healed  Problem List Items Addressed This Visit       Idiopathic chronic venous hypertension of left lower extremity with ulcer (HCC)    Relevant Medications    lidocaine (XYLOCAINE) 2 % viscous solution 15 mL (Completed)    Other Relevant Orders    INITIATE OUTPATIENT WOUND CARE PROTOCOL    Localized edema    Relevant Medications    lidocaine (XYLOCAINE) 2 % viscous solution 15 mL (Completed)    Other Relevant Orders    INITIATE OUTPATIENT WOUND CARE PROTOCOL    Non-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 (HCC)    Relevant Medications    lidocaine (XYLOCAINE) 2 % viscous solution 15 mL (Completed)    Other Relevant Orders    INITIATE OUTPATIENT WOUND CARE PROTOCOL    Ulcer of left foot, with fat layer exposed ([address]    Relevant Medications    lidocaine (XYLOCAINE) 2 % viscous solution 15 mL (Completed)    Other Relevant Orders    INITIATE OUTPATIENT WOUND CARE PROTOCOL    Ulcer of right heel, with fat layer exposed (HCC)    Relevant Medications    lidocaine (XYLOCAINE) 2 % viscous solution 15 mL (Completed)    Other Relevant Orders    INITIATE OUTPATIENT WOUND CARE PROTOCOL    Non-pressure chronic ulcer of other part of right lower leg with fat layer exposed (HCC) - Primary    Relevant Medications    lidocaine (XYLOCAINE) 2 % viscous solution 15 mL (Completed)    Other Relevant Orders    INITIATE OUTPATIENT WOUND CARE PROTOCOL       Procedure Note  Indications:  Based on my examination of this patient's wound(s)/ulcer(s) today, debridement is not required to promote healing and evaluate the wound base.     Wound Leg lower Left;Lateral #1 (Active)   Wound Image   09/28/22 1400   Wound Etiology Venous 09/28/22 1400   Dressing Status Clean;Dry; Intact 09/28/22 1400   Cleansed Soap and water 09/28/22 1400   Wound Length (cm) 1.3 cm 09/28/22 1400   Wound Width (cm) 0.6 cm 09/28/22 1400   Wound Depth (cm) 0.2 cm 09/28/22 1400   Wound Surface Area (cm^2) 0.78 cm^2 09/28/22 1400   Change in Wound Size % 99.29 09/28/22 1400   Wound Volume (cm^3) 0.156 cm^3 09/28/22 1400   Wound Healing % 99 09/28/22 1400   Post-Procedure Length (cm) 10.2 cm 07/20/22 1519   Post-Procedure Width (cm) 5 cm 07/20/22 1519   Post-Procedure Depth (cm) 0.1 cm 07/20/22 1519   Post-Procedure Surface Area (cm^2) 51 cm^2 07/20/22 1519   Post-Procedure Volume (cm^3) 5.1 cm^3 07/20/22 1519   Wound Assessment Slough;Granulation tissue 09/28/22 1400   Drainage Amount Moderate 09/28/22 1400   Drainage Description Serosanguinous 09/28/22 1400   Wound Odor None 09/28/22 1400   Alissa-Wound/Incision Assessment Intact;Dry/flaky 09/28/22 1400   Edges Attached edges 09/28/22 1400   Wound Thickness Description Full thickness 09/28/22 1400   Number of days: 210        Plan:   Silvercel, juxtalite  Treatment Note please see attached Discharge Instructions    Written patient dismissal instructions given to patient or POA.          Electronically signed by Erica Tran MD on 9/28/2022 at 2:15 PM

## 2022-09-28 NOTE — WOUND CARE
Discharge Instructions  [address] [place] [address], [facility]  Telephone: [phone]    NAME:  Ema Factor OF BIRTH:  1955  MEDICAL RECORD NUMBER:  [id number]  DATE: 9/28/2022       Return Appointment:  [] Dressing supply provider:   [x] Home Healthcare: [address] [facility]  [x] Return Appointment: 1 Week(s)  [] Nurse Visit:       [] Discharge from [facility]  [] Ordered tests:    [] Referrals:                         [] Rx:        Wound Cleansing:   Do not scrub or use excessive force. Cleanse wound prior to applying a clean dressing with:  [] Normal Saline   [x] Mild Soap & Water/Baby Shampoo    [] Keep Wound Dry in Shower  [] Other:      Topical Treatments:  Do not apply lotions, creams, or ointments to wound bed unless directed. [x] Apply moisturizing lotion to skin surrounding the wound prior to dressing change.  [] Apply antifungal ointment to skin surrounding the wound prior to dressing change.  [] Apply thin film of moisture barrier ointment to skin immediately around wound. [] Other:  Betadine to israel-wound     Dressings:           Wound Location L leg   [x] Apply Primary Dressing:       [] MediHoney Gel    [] MediHoney Alginate               [] Calcium Alginate      [x] Calcium Alginate with silver- Silvercel   [] Collagen                   [] Collagen with Silver                [] Santyl with Moistened saline gauze              [] Hydrofera Blue (cut to size and moistened)  [] Hydrofera Blue Ready (Cut to size)   [] NS wet to dry    [] Betadine wet to dry              [] Hydrogel                [] Mepitel     [] Bactroban/Mupirocin               [x] Other:  juxtalites to both legs    [x] Cover and Secure with:     [x] Gauze [] Gaston Latin [] Kerlix   [] Foam [] Super Absorbant [x] ABD     [] ACE Wrap            [] Other:    Avoid contact of tape with skin.     [x] Change dressing: [] Daily    [] Every Other Day [] Once per week   [] Twice per week   [x] Three times per week [] Do Not Change Dressing   [] Other:      Compression:  Apply: [] Multilayer Compression Wrap: []RightLeg []Left Leg                                 []Profore   [] profore lite          []Unna     [x] Do not get leg(s) with wrap wet. If wraps become too tight call the center or completely remove the wrap. [x] Elevate leg(s) above the level of the heart when sitting. [x] Avoid prolonged standing in one place. Dietary:  [] Diet as tolerated: [x] Calorie Diabetic Diet: [x] No Added Salt:  [x] Increase Protein: [] Other:     Activity:  [x] Activity as tolerated:    [] Patient has no activity restrictions       [] Strict Bedrest:   [] Remain off Work:       [] May return to full duty work:                                     [] Return to work with restrictions:               [address] [facility] Information: Should you experience any significant changes in your wound(s) or have questions about your wound care, please contact Thais Doyle 26 at [place] [address] 8:00 am - 4:30. If you need help with your wound outside of these hours and cannot wait until we are again available, contact your PCP or go to the hospital emergency room. PLEASE NOTE: IF YOU ARE UNABLE TO OBTAIN WOUND SUPPLIES, CONTINUE TO USE THE SUPPLIES YOU HAVE AVAILABLE UNTIL YOU ARE ABLE TO REACH US. IT IS MOST IMPORTANT TO KEEP THE WOUND COVERED AT ALL TIMES.     [x] Dr. Kaleen Goldmann   [] Dr. Radha Garcia  [] Dr. Robby Zhang

## 2022-09-28 NOTE — DISCHARGE INSTRUCTIONS
tructions  [address] [place] [address]  Telephone: [phone]    NAME:  Ezekiel Salazar OF BIRTH:  1955  MEDICAL RECORD NUMBER:  [id number]  DATE: 9/28/2022       Return Appointment:  [] Dressing supply provider:   [x] Home Healthcare: [address] [facility]  [x] Return Appointment: 1 Week(s)  [] Nurse Visit:       [] Discharge from [facility]  [] Ordered tests:    [] Referrals:                         [] Rx:        Wound Cleansing:   Do not scrub or use excessive force. Cleanse wound prior to applying a clean dressing with:  [] Normal Saline   [x] Mild Soap & Water/Baby Shampoo    [] Keep Wound Dry in Shower  [] Other:      Topical Treatments:  Do not apply lotions, creams, or ointments to wound bed unless directed. [x] Apply moisturizing lotion to skin surrounding the wound prior to dressing change.  [] Apply antifungal ointment to skin surrounding the wound prior to dressing change.  [] Apply thin film of moisture barrier ointment to skin immediately around wound. [] Other:  Betadine to israel-wound     Dressings:           Wound Location L leg   [x] Apply Primary Dressing:       [] MediHoney Gel    [] MediHoney Alginate               [] Calcium Alginate      [x] Calcium Alginate with silver- Silvercel   [] Collagen                   [] Collagen with Silver                [] Santyl with Moistened saline gauze              [] Hydrofera Blue (cut to size and moistened)  [] Hydrofera Blue Ready (Cut to size)   [] NS wet to dry    [] Betadine wet to dry              [] Hydrogel                [] Mepitel     [] Bactroban/Mupirocin               [x] Other:  juxtalites to both legs    [x] Cover and Secure with:     [x] Gauze [] Trinna Tnoio [] Kerlix   [] Foam [] Super Absorbant [x] ABD     [] ACE Wrap            [] Other:    Avoid contact of tape with skin.     [x] Change dressing: [] Daily    [] Every Other Day [] Once per week [] Twice per week   [x] Three times per week [] Do Not Change Dressing   [] Other:      Compression:  Apply: [] Multilayer Compression Wrap: []RightLeg []Left Leg                                 []Profore   [] profore lite          []Unna     [x] Do not get leg(s) with wrap wet. If wraps become too tight call the center or completely remove the wrap. [x] Elevate leg(s) above the level of the heart when sitting. [x] Avoid prolonged standing in one place. Dietary:  [] Diet as tolerated: [x] Calorie Diabetic Diet: [x] No Added Salt:  [x] Increase Protein: [] Other:     Activity:  [x] Activity as tolerated:    [] Patient has no activity restrictions       [] Strict Bedrest:   [] Remain off Work:       [] May return to full duty work:                                     [] Return to work with restrictions:               215 [facility] Information: Should you experience any significant changes in your wound(s) or have questions about your wound care, please contact Thais Doyle 26 at [place] [address] 8:00 am - 4:30. If you need help with your wound outside of these hours and cannot wait until we are again available, contact your PCP or go to the hospital emergency room. PLEASE NOTE: IF YOU ARE UNABLE TO OBTAIN WOUND SUPPLIES, CONTINUE TO USE THE SUPPLIES YOU HAVE AVAILABLE UNTIL YOU ARE ABLE TO REACH US. IT IS MOST IMPORTANT TO KEEP THE WOUND COVERED AT ALL TIMES.     [x] Dr. Juan Aguilera   [] Dr. Stalin Benz  [] Dr. Grupo San

## 2022-10-04 NOTE — PROGRESS NOTES
[facility]  Lymphedema Clinic  [address], [address], [address] THERAPY DISCHARGE NOTE      10/4/2022:  Patient will be discharged from lymphedema therapy at this time. Criteria for termination of care:    Patient has not returned to therapy and his plan of care ended 2/18/2022. If you need any further information, please contact us at [phone].     Thank you for this referral.  Matthias Pressley, PT, CLT

## 2022-10-05 ENCOUNTER — HOSPITAL ENCOUNTER (OUTPATIENT)
Dept: WOUND CARE | Age: 67
Discharge: HOME | End: 2022-10-05
Payer: MEDICARE

## 2022-10-05 VITALS
TEMPERATURE: 96.9 F | DIASTOLIC BLOOD PRESSURE: 81 MMHG | SYSTOLIC BLOOD PRESSURE: 150 MMHG | RESPIRATION RATE: 22 BRPM | HEART RATE: 63 BPM | OXYGEN SATURATION: 100 %

## 2022-10-05 DIAGNOSIS — L97.412 ULCER OF RIGHT HEEL, WITH FAT LAYER EXPOSED (HCC): ICD-10-CM

## 2022-10-05 DIAGNOSIS — L97.929 IDIOPATHIC CHRONIC VENOUS HYPERTENSION OF LEFT LOWER EXTREMITY WITH ULCER (HCC): ICD-10-CM

## 2022-10-05 DIAGNOSIS — L97.822 NON-PRESSURE CHRONIC ULCER OF OTHER PART OF LEFT LOWER LEG WITH FAT LAYER EXPOSED (HCC): ICD-10-CM

## 2022-10-05 DIAGNOSIS — I87.312 IDIOPATHIC CHRONIC VENOUS HYPERTENSION OF LEFT LOWER EXTREMITY WITH ULCER (HCC): ICD-10-CM

## 2022-10-05 DIAGNOSIS — L97.522 ULCER OF LEFT FOOT, WITH FAT LAYER EXPOSED (HCC): ICD-10-CM

## 2022-10-05 DIAGNOSIS — R60.0 LOCALIZED EDEMA: ICD-10-CM

## 2022-10-05 DIAGNOSIS — L97.812 NON-PRESSURE CHRONIC ULCER OF OTHER PART OF RIGHT LOWER LEG WITH FAT LAYER EXPOSED (HCC): Primary | ICD-10-CM

## 2022-10-05 PROCEDURE — 29581 APPL MULTLAYER CMPRN SYS LEG: CPT

## 2022-10-05 PROCEDURE — 74011000250 HC RX REV CODE- 250: Performed by: SPECIALIST

## 2022-10-05 RX ORDER — LIDOCAINE HYDROCHLORIDE 20 MG/ML
15 SOLUTION OROPHARYNGEAL ONCE
Status: COMPLETED | OUTPATIENT
Start: 2022-10-05 | End: 2022-10-05

## 2022-10-05 RX ORDER — LIDOCAINE HYDROCHLORIDE 20 MG/ML
15 SOLUTION OROPHARYNGEAL ONCE
Status: CANCELLED | OUTPATIENT
Start: 2022-10-05 | End: 2022-10-05

## 2022-10-05 RX ORDER — MUPIROCIN 20 MG/G
OINTMENT TOPICAL ONCE
Status: CANCELLED | OUTPATIENT
Start: 2022-10-05 | End: 2022-10-05

## 2022-10-05 RX ORDER — SILVER SULFADIAZINE 10 G/1000G
CREAM TOPICAL ONCE
Status: CANCELLED | OUTPATIENT
Start: 2022-10-05 | End: 2022-10-05

## 2022-10-05 RX ADMIN — LIDOCAINE HYDROCHLORIDE 15 ML: 20 SOLUTION ORAL; TOPICAL at 14:04

## 2022-10-05 NOTE — WOUND CARE
Multilayer Compression Wrap   (Not Unna) Below the Knee    NAME:  Guzman Dominguez OF BIRTH:  1955  MEDICAL RECORD NUMBER:  [id number]  DATE:  10/5/2022    Applied primary and secondary dressing as ordered. Applied multilayered dressing below the knee to left lower leg. Instructed patient/caregiver not to remove dressing and to keep it clean and dry. Instructed patient/caregiver on complications to report to provider, such as pain, numbness in toes, heavy drainage, and slippage of dressing. Instructed patient on purpose of compression dressing and on activity and exercise recommendations.     Response to treatment: Well tolerated by patient       Electronically signed by Thad Whitlock RN on 10/5/2022 at 2:30 PM

## 2022-10-05 NOTE — WOUND CARE
Discharge Instructions  [address] [place] [address], [facility]  Telephone: [phone]    NAME:  Gwen Lu OF BIRTH:  1955  MEDICAL RECORD NUMBER:  [id number]  DATE: 10/05/2022       Return Appointment:  [] Dressing supply provider:   [x] Home Healthcare: [facility] [facility]  [x] Return Appointment: 1 Week(s)  [] Nurse Visit:       [] Discharge from [facility]  [] Ordered tests:    [] Referrals:                         [] Rx:        Wound Cleansing:   Do not scrub or use excessive force. Cleanse wound prior to applying a clean dressing with:  [] Normal Saline   [x] Mild Soap & Water/Baby Shampoo    [] Keep Wound Dry in Shower  [] Other:      Topical Treatments:  Do not apply lotions, creams, or ointments to wound bed unless directed. [x] Apply moisturizing lotion to skin surrounding the wound prior to dressing change.  [] Apply antifungal ointment to skin surrounding the wound prior to dressing change.  [] Apply thin film of moisture barrier ointment to skin immediately around wound. [] Other:  Betadine to israel-wound     Dressings:           Wound Location L leg   [x] Apply Primary Dressing:       [] MediHoney Gel    [] MediHoney Alginate               [] Calcium Alginate      [x] Calcium Alginate with silver- Silvercel   [] Collagen                   [] Collagen with Silver                [] Santyl with Moistened saline gauze              [] Hydrofera Blue (cut to size and moistened)  [] Hydrofera Blue Ready (Cut to size)   [] NS wet to dry    [] Betadine wet to dry              [] Hydrogel                [] Mepitel     [] Bactroban/Mupirocin               [] Other:     [x] Cover and Secure with:     [x] Gauze [] Serg Ocampo [] Kerlix   [] Foam [] Super Absorbant [x] ABD     [] ACE Wrap            [] Other:    Avoid contact of tape with skin.     [x] Change dressing: [] Daily    [] Every Other Day [] Once per week   [x] Twice per week   [] Three times per week [] Do Not Change Dressing   [] Other:      Compression:  Apply: [] Multilayer Compression Wrap: []RightLeg []Left Leg                                 []Profore   [x] profore lite  (coflex lite)        []Unna     [x] Do not get leg(s) with wrap wet. If wraps become too tight call the center or completely remove the wrap. [x] Elevate leg(s) above the level of the heart when sitting. [x] Avoid prolonged standing in one place. Dietary:  [] Diet as tolerated: [x] Calorie Diabetic Diet: [x] No Added Salt:  [x] Increase Protein: [] Other:     Activity:  [x] Activity as tolerated:    [] Patient has no activity restrictions       [] Strict Bedrest:   [] Remain off Work:       [] May return to full duty work:                                     [] Return to work with restrictions:               [address] [facility] Information: Should you experience any significant changes in your wound(s) or have questions about your wound care, please contact Thais Doyle 26 at [place] [address] 8:00 am - 4:30. If you need help with your wound outside of these hours and cannot wait until we are again available, contact your PCP or go to the hospital emergency room. PLEASE NOTE: IF YOU ARE UNABLE TO OBTAIN WOUND SUPPLIES, CONTINUE TO USE THE SUPPLIES YOU HAVE AVAILABLE UNTIL YOU ARE ABLE TO REACH US. IT IS MOST IMPORTANT TO KEEP THE WOUND COVERED AT ALL TIMES.     [x] Dr. Geno Coronado   [] Dr. Rissa Brady  [] Dr. Aliyah Borden

## 2022-10-05 NOTE — WOUND CARE
[address]   Progress Note and Procedure Note   NO Procedure      [address] RECORD NUMBER:  [id number]  AGE: 78 y.o. RACE WHITE/NON-  GENDER: male  : 1955  EPISODE DATE:  10/5/2022    Subjective:   No new problems  Chief Complaint   Patient presents with    Wound Check     Left leg         HISTORY of PRESENT ILLNESS HPI    Miguel Kang is a 78 y.o. male who presents today for wound/ulcer evaluation.    History of Wound Context: LLE  Wound/Ulcer Pain Timing/Severity: none  Quality of pain: dull  Severity:  0 / 10   Modifying Factors: None  Associated Signs/Symptoms: edema    Ulcer Identification:  Ulcer Type: venous    Contributing Factors: lymphedema    Wound: LLE         PAST MEDICAL HISTORY    Past Medical History:   Diagnosis Date    Arthritis     CAD (coronary artery disease)     Chronic obstructive pulmonary disease (HCC)     Diabetes (HCC)     Gout     Hypertension     Ill-defined condition     Sleep apnea         PAST SURGICAL HISTORY    Past Surgical History:   Procedure Laterality Date    HX ORTHOPAEDIC      HX PACEMAKER      HX VEIN ABLATION ADHESIVE         FAMILY HISTORY    Family History   Problem Relation Age of Onset    Heart Disease Mother     Kidney Disease Mother     Hypertension Mother     Diabetes Mother     Heart Disease Father     Hypertension Father     Diabetes Sister     Diabetes Brother        SOCIAL HISTORY    Social History     Tobacco Use    Smoking status: Never    Smokeless tobacco: Never   Vaping Use    Vaping Use: Never used   Substance Use Topics    Alcohol use: Not Currently    Drug use: Never       ALLERGIES    Allergies   Allergen Reactions    Elavil Angioedema    Naproxen Unknown (comments)     Pt not sure of reaction    Sulfa (Sulfonamide Antibiotics) Nausea and Vomiting       MEDICATIONS    Current Outpatient Medications on File Prior to Encounter   Medication Sig Dispense Refill    insulin glargine (LANTUS) 100 unit/mL injection 27-unit subcu at bedtime 1 mL 2    insulin glargine,hum.rec.anlog (TOUJEO MAX U-300 SOLOSTAR SC) 80 Units by SubCUTAneous route two (2) times a day. 80 units in am, 34 units in pm      b complex vitamins tablet Take 1 Tablet by mouth daily. ammonium lactate (AMLACTIN) 12 % topical cream Apply  to affected area as needed for Other (xerosis). rub in to affected area well  Indications: dry skin 280 g 0    vitamin E, dl,tocopheryl acet, (vitamin E acetate) 400 unit capsule Take 2 Capsules by mouth two (2) times a day. 100 Capsule 0    ascorbic acid, vitamin C, (VITAMIN C) 500 mg tablet Take 1,000 mg by mouth two (2) times a day. Indications: inadequate vitamin C      tamsulosin (FLOMAX) 0.4 mg capsule Take 0.8 mg by mouth daily. tiotropium (SPIRIVA) 18 mcg inhalation capsule Take 1 Capsule by inhalation daily. bumetanide (BUMEX) 2 mg tablet Take 2 mg by mouth two (2) times a day. ferrous sulfate (Iron) 325 mg (65 mg iron) tablet Take  by mouth two (2) times daily (after meals). aspirin 81 mg chewable tablet Take 81 mg by mouth daily. flunisolide (NASAREL) 25 mcg (0.025 %) spry 2 Sprays two (2) times a day. albuterol (PROVENTIL HFA, VENTOLIN HFA, PROAIR HFA) 90 mcg/actuation inhaler Take 1 Puff by inhalation every four (4) hours as needed for Wheezing or Shortness of Breath. colchicine 0.6 mg tablet Take 0.6 mg by mouth daily. gabapentin (NEURONTIN) 300 mg capsule Take 300 mg by mouth four (4) times daily. insulin lispro (HUMALOG) 100 unit/mL injection by SubCUTAneous route. Sliding scale       No current facility-administered medications on file prior to encounter. REVIEW OF SYSTEMS    Pertinent items are noted in HPI.     Objective:     Visit Vitals  BP (!) 150/81 (BP 1 Location: Right arm, BP Patient Position: At rest;Sitting)   Pulse 63   Temp 96.9 °F (36.1 °C)   Resp 22   SpO2 100%       Wt Readings from Last 3 Encounters:   07/25/22 157.4 kg (347 lb)   07/21/22 157.4 kg (347 lb)   07/11/22 157.4 kg (347 lb)       PHYSICAL EXAM  Original wound smaller, almost healed  New small, superficial skin tear adjacent to original wound  Pertinent items are noted in HPI.     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 ([address]    Relevant Medications    lidocaine (XYLOCAINE) 2 % viscous solution 15 mL (Completed) (Start on 10/5/2022  3:00 PM)    Other Relevant Orders    INITIATE OUTPATIENT WOUND CARE PROTOCOL    Ulcer of left foot, with fat layer exposed ([address]    Relevant Medications    lidocaine (XYLOCAINE) 2 % viscous solution 15 mL (Completed) (Start on 10/5/2022  3:00 PM)    Other Relevant Orders    INITIATE OUTPATIENT WOUND CARE PROTOCOL    Ulcer of right heel, with fat layer exposed ([address]    Relevant Medications    lidocaine (XYLOCAINE) 2 % viscous solution 15 mL (Completed) (Start on 10/5/2022  3:00 PM)    Other Relevant Orders    INITIATE OUTPATIENT WOUND CARE PROTOCOL    Non-pressure chronic ulcer of other part of right lower leg with fat layer exposed ([address] - Primary    Relevant Medications    lidocaine (XYLOCAINE) 2 % viscous solution 15 mL (Completed) (Start on 10/5/2022  3:00 PM)    Other Relevant Orders    INITIATE OUTPATIENT WOUND CARE PROTOCOL       Procedure Note  Indications:  Based on my examination of this patient's wound(s)/ulcer(s) today, debridement is not required to promote healing and evaluate the wound base. Wound Leg lower Left;Lateral #1 (Active)   Wound Image   10/05/22 1404   Wound Etiology Venous 10/05/22 1404   Dressing Status Clean;Dry; Intact 10/05/22 1404   Cleansed Cleansed with saline 10/05/22 1404   Wound Length (cm) 3.5 cm 10/05/22 1404   Wound Width (cm) 1 cm 10/05/22 1404   Wound Depth (cm) 0.2 cm 10/05/22 1404   Wound Surface Area (cm^2) 3.5 cm^2 10/05/22 1404   Change in Wound Size % 96.82 10/05/22 1404   Wound Volume (cm^3) 0.7 cm^3 10/05/22 1404   Wound Healing % 94 10/05/22 1404   Post-Procedure Length (cm) 10.2 cm 07/20/22 1519   Post-Procedure Width (cm) 5 cm 07/20/22 1519   Post-Procedure Depth (cm) 0.1 cm 07/20/22 1519   Post-Procedure Surface Area (cm^2) 51 cm^2 07/20/22 1519   Post-Procedure Volume (cm^3) 5.1 cm^3 07/20/22 1519   Wound Assessment Granulation tissue 10/05/22 1404   Drainage Amount Small 10/05/22 1404   Drainage Description Serosanguinous 10/05/22 1404   Wound Odor None 10/05/22 1404   Alissa-Wound/Incision Assessment Intact 10/05/22 1404   Edges Attached edges 10/05/22 1404   Wound Thickness Description Full thickness 10/05/22 1404   Number of days: 217        Plan:   Silvercel, CoFlex lite    Treatment Note please see attached Discharge Instructions    Written patient dismissal instructions given to patient or POA.          Electronically signed by Bang Andres MD on 10/5/2022 at 2:19 PM

## 2022-10-05 NOTE — DISCHARGE INSTRUCTIONS
Discharge Instructions  [address] [place] [address], [address]  Telephone: [phone]    NAME:  Lucille Guallpa OF BIRTH:  1955  MEDICAL RECORD NUMBER:  [id number]  DATE: 10/05/2022       Return Appointment:  [] Dressing supply provider:   [x] Home Healthcare: [facility] [facility]  [x] Return Appointment: 1 Week(s)  [] Nurse Visit:       [] Discharge from [facility]  [] Ordered tests:    [] Referrals:                         [] Rx:        Wound Cleansing:   Do not scrub or use excessive force. Cleanse wound prior to applying a clean dressing with:  [] Normal Saline   [x] Mild Soap & Water/Baby Shampoo    [] Keep Wound Dry in Shower  [] Other:      Topical Treatments:  Do not apply lotions, creams, or ointments to wound bed unless directed. [x] Apply moisturizing lotion to skin surrounding the wound prior to dressing change.  [] Apply antifungal ointment to skin surrounding the wound prior to dressing change.  [] Apply thin film of moisture barrier ointment to skin immediately around wound. [] Other:  Betadine to israel-wound     Dressings:           Wound Location L leg   [x] Apply Primary Dressing:       [] MediHoney Gel    [] MediHoney Alginate               [] Calcium Alginate      [x] Calcium Alginate with silver- Silvercel   [] Collagen                   [] Collagen with Silver                [] Santyl with Moistened saline gauze              [] Hydrofera Blue (cut to size and moistened)  [] Hydrofera Blue Ready (Cut to size)   [] NS wet to dry    [] Betadine wet to dry              [] Hydrogel                [] Mepitel     [] Bactroban/Mupirocin               [] Other:     [x] Cover and Secure with:     [x] Gauze [] Oracio Skeens [] Kerlix   [] Foam [] Super Absorbant [x] ABD     [] ACE Wrap            [] Other:    Avoid contact of tape with skin.     [x] Change dressing: [] Daily    [] Every Other Day [] Once per week   [x] Twice per week   [] Three times per week [] Do Not Change Dressing   [] Other:      Compression:  Apply: [] Multilayer Compression Wrap: []RightLeg []Left Leg                                 []Profore   [x] profore lite  (coflex lite)        []Unna     [x] Do not get leg(s) with wrap wet. If wraps become too tight call the center or completely remove the wrap. [x] Elevate leg(s) above the level of the heart when sitting. [x] Avoid prolonged standing in one place. Dietary:  [] Diet as tolerated: [x] Calorie Diabetic Diet: [x] No Added Salt:  [x] Increase Protein: [] Other:     Activity:  [x] Activity as tolerated:    [] Patient has no activity restrictions       [] Strict Bedrest:   [] Remain off Work:       [] May return to full duty work:                                     [] Return to work with restrictions:               [facility] Information: Should you experience any significant changes in your wound(s) or have questions about your wound care, please contact Thais Caldera at [place] [address] 8:00 am - 4:30. If you need help with your wound outside of these hours and cannot wait until we are again available, contact your PCP or go to the hospital emergency room. PLEASE NOTE: IF YOU ARE UNABLE TO OBTAIN WOUND SUPPLIES, CONTINUE TO USE THE SUPPLIES YOU HAVE AVAILABLE UNTIL YOU ARE ABLE TO REACH US. IT IS MOST IMPORTANT TO KEEP THE WOUND COVERED AT ALL TIMES.     [x] Dr. Missael Vaughn   [] Dr. Abel Marc  [] Dr. Arjun Gonzales

## 2022-10-12 ENCOUNTER — HOSPITAL ENCOUNTER (OUTPATIENT)
Dept: WOUND CARE | Age: 67
Discharge: HOME | End: 2022-10-12
Payer: MEDICARE

## 2022-10-12 VITALS
SYSTOLIC BLOOD PRESSURE: 155 MMHG | TEMPERATURE: 96.9 F | OXYGEN SATURATION: 97 % | DIASTOLIC BLOOD PRESSURE: 79 MMHG | RESPIRATION RATE: 22 BRPM | HEART RATE: 79 BPM

## 2022-10-12 DIAGNOSIS — R60.0 LOCALIZED EDEMA: ICD-10-CM

## 2022-10-12 DIAGNOSIS — L97.522 ULCER OF LEFT FOOT, WITH FAT LAYER EXPOSED (HCC): ICD-10-CM

## 2022-10-12 DIAGNOSIS — L97.812 NON-PRESSURE CHRONIC ULCER OF OTHER PART OF RIGHT LOWER LEG WITH FAT LAYER EXPOSED (HCC): Primary | ICD-10-CM

## 2022-10-12 DIAGNOSIS — L97.412 ULCER OF RIGHT HEEL, WITH FAT LAYER EXPOSED (HCC): ICD-10-CM

## 2022-10-12 DIAGNOSIS — L97.822 NON-PRESSURE CHRONIC ULCER OF OTHER PART OF LEFT LOWER LEG WITH FAT LAYER EXPOSED (HCC): ICD-10-CM

## 2022-10-12 DIAGNOSIS — L97.929 IDIOPATHIC CHRONIC VENOUS HYPERTENSION OF LEFT LOWER EXTREMITY WITH ULCER (HCC): ICD-10-CM

## 2022-10-12 DIAGNOSIS — I87.312 IDIOPATHIC CHRONIC VENOUS HYPERTENSION OF LEFT LOWER EXTREMITY WITH ULCER (HCC): ICD-10-CM

## 2022-10-12 PROCEDURE — 74011000250 HC RX REV CODE- 250: Performed by: SPECIALIST

## 2022-10-12 PROCEDURE — 29581 APPL MULTLAYER CMPRN SYS LEG: CPT

## 2022-10-12 RX ORDER — LIDOCAINE HYDROCHLORIDE 20 MG/ML
15 SOLUTION OROPHARYNGEAL ONCE
Status: COMPLETED | OUTPATIENT
Start: 2022-10-12 | End: 2022-10-12

## 2022-10-12 RX ORDER — MUPIROCIN 20 MG/G
OINTMENT TOPICAL ONCE
Status: CANCELLED | OUTPATIENT
Start: 2022-10-12 | End: 2022-10-12

## 2022-10-12 RX ORDER — LIDOCAINE HYDROCHLORIDE 20 MG/ML
15 SOLUTION OROPHARYNGEAL ONCE
Status: CANCELLED | OUTPATIENT
Start: 2022-10-12 | End: 2022-10-12

## 2022-10-12 RX ORDER — SILVER SULFADIAZINE 10 G/1000G
CREAM TOPICAL ONCE
Status: CANCELLED | OUTPATIENT
Start: 2022-10-12 | End: 2022-10-12

## 2022-10-12 RX ORDER — OXYCODONE HYDROCHLORIDE 10 MG/1
10 TABLET ORAL
COMMUNITY

## 2022-10-12 RX ADMIN — LIDOCAINE HYDROCHLORIDE 15 ML: 20 SOLUTION ORAL; TOPICAL at 13:44

## 2022-10-12 NOTE — WOUND CARE
Multilayer Compression Wrap   (Not Unna) Below the Knee    NAME:  Thom Wright OF BIRTH:  1955  MEDICAL RECORD NUMBER:  [id number]  DATE:  10/12/2022    Removed old Multilayer wrap if indicated and wash leg with mild soap/water. Applied primary and secondary dressing as ordered. Applied multilayered dressing below the knee to right lower leg. Applied multilayered dressing below the knee to left lower leg. Instructed patient/caregiver not to remove dressing and to keep it clean and dry. Instructed patient/caregiver on complications to report to provider, such as pain, numbness in toes, heavy drainage, and slippage of dressing. Instructed patient on purpose of compression dressing and on activity and exercise recommendations.     Response to treatment: Well tolerated by patient       Electronically signed by Angela Luther RN on 10/12/2022 at 2:06 PM

## 2022-10-12 NOTE — DISCHARGE INSTRUCTIONS
Discharge Instructions  7 [facility], [address] [place] [address]  Telephone: [phone]    NAME:  Thom Wright OF BIRTH:  1955  MEDICAL RECORD NUMBER:  [id number]  DATE: 10/05/2022       Return Appointment:  [] Dressing supply provider:   [x] [facility] [facility]  [x] Return Appointment: 1 Week(s)  [] Nurse Visit:       [] Discharge from [facility]  [] Ordered tests:    [] Referrals:                         [] Rx:        Wound Cleansing:   Do not scrub or use excessive force. Cleanse wound prior to applying a clean dressing with:  [] Normal Saline   [x] Mild Soap & Water/Baby Shampoo    [] Keep Wound Dry in Shower  [] Other:      Topical Treatments:  Do not apply lotions, creams, or ointments to wound bed unless directed. [x] Apply moisturizing lotion to skin surrounding the wound prior to dressing change.  [] Apply antifungal ointment to skin surrounding the wound prior to dressing change.  [] Apply thin film of moisture barrier ointment to skin immediately around wound. [] Other:  Betadine to israel-wound     Dressings:           Wound Location L leg and R Leg  [x] Apply Primary Dressing:       [] MediHoney Gel    [] MediHoney Alginate               [] Calcium Alginate      [x] Calcium Alginate with silver- Silvercel   [] Collagen                   [] Collagen with Silver                [] Santyl with Moistened saline gauze              [] Hydrofera Blue (cut to size and moistened)  [] Hydrofera Blue Ready (Cut to size)   [] NS wet to dry    [] Betadine wet to dry              [] Hydrogel                [] Mepitel     [] Bactroban/Mupirocin               [] Other:     [x] Cover and Secure with:     [x] Gauze [] Karla Pastures [] Kerlix   [] Foam [] Super Absorbant [x] ABD     [] ACE Wrap            [] Other:    Avoid contact of tape with skin.     [x] Change dressing: [] Daily    [] Every Other Day [] Once per week [x] Twice per week   [] Three times per week [] Do Not Change Dressing   [] Other:      Compression:  Apply: [x] Multilayer Compression Wrap: [x]RightLeg [x]Left Leg                                 []Profore   [x] profore lite  (coflex lite)        []Unna     [x] Do not get leg(s) with wrap wet. If wraps become too tight call the center or completely remove the wrap. [x] Elevate leg(s) above the level of the heart when sitting. [x] Avoid prolonged standing in one place. Dietary:  [] Diet as tolerated: [x] Calorie Diabetic Diet: [x] No Added Salt:  [x] Increase Protein: [] Other:     Activity:  [x] Activity as tolerated:    [] Patient has no activity restrictions       [] Strict Bedrest:   [] Remain off Work:       [] May return to full duty work:                                     [] Return to work with restrictions:               [address] [facility] [address] Information: Should you experience any significant changes in your wound(s) or have questions about your wound care, please contact Thais Doyle 26 at [place] [address] 8:00 am - 4:30. If you need help with your wound outside of these hours and cannot wait until we are again available, contact your PCP or go to the hospital emergency room. PLEASE NOTE: IF YOU ARE UNABLE TO OBTAIN WOUND SUPPLIES, CONTINUE TO USE THE SUPPLIES YOU HAVE AVAILABLE UNTIL YOU ARE ABLE TO REACH US. IT IS MOST IMPORTANT TO KEEP THE WOUND COVERED AT ALL TIMES.     [x] Dr. Eddie Rachel   [] Dr. Julia Wolf  [] Dr. Patrice Freire

## 2022-10-12 NOTE — WOUND CARE
[address]   Progress Note and Procedure Note   NO Procedure      [address] RECORD NUMBER:  [id number]  AGE: 78 y.o. RACE WHITE/NON-  GENDER: male  : 1955  EPISODE DATE:  10/12/2022    Subjective:   New wound R leg    Chief Complaint   Patient presents with    Wound Check     Left leg         HISTORY of PRESENT ILLNESS HPI    Benetta Schwab is a 78 y.o. male who presents today for wound/ulcer evaluation.    History of Wound Context: BLE  Wound/Ulcer Pain Timing/Severity: none  Quality of pain: dull  Severity:  1 / 10   Modifying Factors: None  Associated Signs/Symptoms: edema    Ulcer Identification:  Ulcer Type: lymphedema    Contributing Factors: lymphedema    Wound: BLE         PAST MEDICAL HISTORY    Past Medical History:   Diagnosis Date    Arthritis     CAD (coronary artery disease)     Chronic obstructive pulmonary disease (HCC)     Diabetes (HCC)     Gout     Hypertension     Ill-defined condition     Sleep apnea         PAST SURGICAL HISTORY    Past Surgical History:   Procedure Laterality Date    HX ORTHOPAEDIC      HX PACEMAKER      HX VEIN ABLATION ADHESIVE         FAMILY HISTORY    Family History   Problem Relation Age of Onset    Heart Disease Mother     Kidney Disease Mother     Hypertension Mother     Diabetes Mother     Heart Disease Father     Hypertension Father     Diabetes Sister     Diabetes Brother        SOCIAL HISTORY    Social History     Tobacco Use    Smoking status: Never    Smokeless tobacco: Never   Vaping Use    Vaping Use: Never used   Substance Use Topics    Alcohol use: Not Currently    Drug use: Never       ALLERGIES    Allergies   Allergen Reactions    Elavil Angioedema    Naproxen Unknown (comments)     Pt not sure of reaction    Sulfa (Sulfonamide Antibiotics) Nausea and Vomiting       MEDICATIONS    Current Outpatient Medications on File Prior to Encounter   Medication Sig Dispense Refill    oxyCODONE IR (Adams Ax) 10 mg tab immediate release tablet Take 10 mg by mouth every eight (8) hours as needed for Pain. insulin glargine (LANTUS) 100 unit/mL injection 27-unit subcu at bedtime 1 mL 2    insulin glargine,hum.rec.anlog (TOUJEO MAX U-300 SOLOSTAR SC) 80 Units by SubCUTAneous route two (2) times a day. 80 units in am, 34 units in pm      b complex vitamins tablet Take 1 Tablet by mouth daily. ammonium lactate (AMLACTIN) 12 % topical cream Apply  to affected area as needed for Other (xerosis). rub in to affected area well  Indications: dry skin 280 g 0    vitamin E, dl,tocopheryl acet, (vitamin E acetate) 400 unit capsule Take 2 Capsules by mouth two (2) times a day. 100 Capsule 0    ascorbic acid, vitamin C, (VITAMIN C) 500 mg tablet Take 1,000 mg by mouth two (2) times a day. Indications: inadequate vitamin C      tamsulosin (FLOMAX) 0.4 mg capsule Take 0.8 mg by mouth daily. tiotropium (SPIRIVA) 18 mcg inhalation capsule Take 1 Capsule by inhalation daily. bumetanide (BUMEX) 2 mg tablet Take 2 mg by mouth two (2) times a day. ferrous sulfate (Iron) 325 mg (65 mg iron) tablet Take  by mouth two (2) times daily (after meals). aspirin 81 mg chewable tablet Take 81 mg by mouth daily. flunisolide (NASAREL) 25 mcg (0.025 %) spry 2 Sprays two (2) times a day. albuterol (PROVENTIL HFA, VENTOLIN HFA, PROAIR HFA) 90 mcg/actuation inhaler Take 1 Puff by inhalation every four (4) hours as needed for Wheezing or Shortness of Breath. colchicine 0.6 mg tablet Take 0.6 mg by mouth daily. gabapentin (NEURONTIN) 300 mg capsule Take 300 mg by mouth four (4) times daily. insulin lispro (HUMALOG) 100 unit/mL injection by SubCUTAneous route. Sliding scale       No current facility-administered medications on file prior to encounter. REVIEW OF SYSTEMS    Pertinent items are noted in HPI. Objective:     Visit Vitals  BP (!) 155/79 (BP 1 Location: Right arm, BP Patient Position:  At rest;Sitting)   Pulse 79   Temp 96.9 °F (36.1 °C)   Resp 22   SpO2 97%       Wt Readings from Last 3 Encounters:   07/25/22 157.4 kg (347 lb)   07/21/22 157.4 kg (347 lb)   07/11/22 157.4 kg (347 lb)       PHYSICAL EXAM  The left lateral leg wound measures significantly smaller, no significant slough, no evidence of infection; there is a new recurrent right anterior leg wound, small, superficial, no evidence of infection  Pertinent items are noted in HPI.     Assessment:   Recurrent R leg wound with Juxta lite instead of coflex 3 layer wrap  L leg wound smaller with Coflex lite compression    Problem List Items Addressed This Visit       Idiopathic chronic venous hypertension of left lower extremity with ulcer (HCC)    Relevant Medications    lidocaine (XYLOCAINE) 2 % viscous solution 15 mL (Completed)    Other Relevant Orders    INITIATE OUTPATIENT WOUND CARE PROTOCOL    Localized edema    Relevant Medications    lidocaine (XYLOCAINE) 2 % viscous solution 15 mL (Completed)    Other Relevant Orders    INITIATE OUTPATIENT WOUND CARE PROTOCOL    Non-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 ([address]    Relevant Medications    lidocaine (XYLOCAINE) 2 % viscous solution 15 mL (Completed)    Other Relevant Orders    INITIATE OUTPATIENT WOUND CARE PROTOCOL    Ulcer of left foot, with fat layer exposed ([address]    Relevant Medications    lidocaine (XYLOCAINE) 2 % viscous solution 15 mL (Completed)    Other Relevant Orders    INITIATE OUTPATIENT WOUND CARE PROTOCOL    Ulcer of right heel, with fat layer exposed (HCC)    Relevant Medications    lidocaine (XYLOCAINE) 2 % viscous solution 15 mL (Completed)    Other Relevant Orders    INITIATE OUTPATIENT WOUND CARE PROTOCOL    Non-pressure chronic ulcer of other part of right lower leg with fat layer exposed ([address] - Primary    Relevant Medications    lidocaine (XYLOCAINE) 2 % viscous solution 15 mL (Completed)    Other Relevant Orders    INITIATE OUTPATIENT WOUND CARE PROTOCOL       Procedure Note  Indications:  Based on my examination of this patient's wound(s)/ulcer(s) today, debridement is not required to promote healing and evaluate the wound base. Wound Leg lower Left;Lateral #1 (Active)   Wound Image   10/12/22 1348   Wound Etiology Venous 10/12/22 1348   Dressing Status Clean;Dry; Intact 10/12/22 1348   Cleansed Soap and water 10/12/22 1348   Wound Length (cm) 0.7 cm 10/12/22 1348   Wound Width (cm) 0.3 cm 10/12/22 1348   Wound Depth (cm) 0.1 cm 10/12/22 1348   Wound Surface Area (cm^2) 0.21 cm^2 10/12/22 1348   Change in Wound Size % 99.81 10/12/22 1348   Wound Volume (cm^3) 0.021 cm^3 10/12/22 1348   Wound Healing % 100 10/12/22 1348   Post-Procedure Length (cm) 10.2 cm 07/20/22 1519   Post-Procedure Width (cm) 5 cm 07/20/22 1519   Post-Procedure Depth (cm) 0.1 cm 07/20/22 1519   Post-Procedure Surface Area (cm^2) 51 cm^2 07/20/22 1519   Post-Procedure Volume (cm^3) 5.1 cm^3 07/20/22 1519   Wound Assessment Granulation tissue;Slough 10/12/22 1348   Drainage Amount Small 10/12/22 1348   Drainage Description Serosanguinous 10/12/22 1348   Wound Odor None 10/12/22 1348   Alissa-Wound/Incision Assessment Intact 10/12/22 1348   Edges Attached edges 10/12/22 1348   Wound Thickness Description Full thickness 10/12/22 1348   Number of days: 224       Wound Leg Right #2 10/12/22 (Active)   Wound Image   10/12/22 1347   Wound Etiology Venous 10/12/22 1347   Dressing Status Clean;Dry; Intact 10/12/22 1347   Cleansed Cleansed with saline 10/12/22 1347   Wound Length (cm) 1.3 cm 10/12/22 1347   Wound Width (cm) 2 cm 10/12/22 1347   Wound Depth (cm) 0.1 cm 10/12/22 1347   Wound Surface Area (cm^2) 2.6 cm^2 10/12/22 1347   Wound Volume (cm^3) 0.26 cm^3 10/12/22 1347   Wound Assessment Granulation tissue 10/12/22 1347   Drainage Amount Moderate 10/12/22 1347   Drainage Description Serosanguinous 10/12/22 1347   Wound Odor None 10/12/22 1347   Alissa-Wound/Incision Assessment Intact 10/12/22 1347   Edges Attached edges 10/12/22 1347   Wound Thickness Description Partial thickness 10/12/22 1347   Number of days: 0        Plan:   Silvercel, coflex lite  Need a long-term strategy for improved compression; Juxta lite may be stretched out and ineffective    Treatment Note please see attached Discharge Instructions    Written patient dismissal instructions given to patient or POA.          Electronically signed by Sandra Ward MD on 10/12/2022 at 2:04 PM

## 2022-10-12 NOTE — WOUND CARE
Discharge Instructions  7 [facility], [address] [place] [address]  Telephone: [phone]    NAME:  Ruth Ervin OF BIRTH:  1955  MEDICAL RECORD NUMBER:  [id number]  DATE: 10/05/2022       Return Appointment:  [] Dressing supply provider:   [x] Home Healthcare: [address] [facility]  [x] Return Appointment: 1 Week(s)  [] Nurse Visit:       [] Discharge from [facility]  [] Ordered tests:    [] Referrals:                         [] Rx:        Wound Cleansing:   Do not scrub or use excessive force. Cleanse wound prior to applying a clean dressing with:  [] Normal Saline   [x] Mild Soap & Water/Baby Shampoo    [] Keep Wound Dry in Shower  [] Other:      Topical Treatments:  Do not apply lotions, creams, or ointments to wound bed unless directed. [x] Apply moisturizing lotion to skin surrounding the wound prior to dressing change.  [] Apply antifungal ointment to skin surrounding the wound prior to dressing change.  [] Apply thin film of moisture barrier ointment to skin immediately around wound. [] Other:  Betadine to israel-wound     Dressings:           Wound Location L leg and R Leg  [x] Apply Primary Dressing:       [] MediHoney Gel    [] MediHoney Alginate               [] Calcium Alginate      [x] Calcium Alginate with silver- Silvercel   [] Collagen                   [] Collagen with Silver                [] Santyl with Moistened saline gauze              [] Hydrofera Blue (cut to size and moistened)  [] Hydrofera Blue Ready (Cut to size)   [] NS wet to dry    [] Betadine wet to dry              [] Hydrogel                [] Mepitel     [] Bactroban/Mupirocin               [] Other:     [x] Cover and Secure with:     [x] Gauze [] Jessy Median [] Kerlix   [] Foam [] Super Absorbant [x] ABD     [] ACE Wrap            [] Other:    Avoid contact of tape with skin.     [x] Change dressing: [] Daily    [] Every Other Day [] Once per week [x] Twice per week   [] Three times per week [] Do Not Change Dressing   [] Other:      Compression:  Apply: [x] Multilayer Compression Wrap: [x]RightLeg [x]Left Leg                                 []Profore   [x] profore lite  (coflex lite)        []Unna     [x] Do not get leg(s) with wrap wet. If wraps become too tight call the center or completely remove the wrap. [x] Elevate leg(s) above the level of the heart when sitting. [x] Avoid prolonged standing in one place. Dietary:  [] Diet as tolerated: [x] Calorie Diabetic Diet: [x] No Added Salt:  [x] Increase Protein: [] Other:     Activity:  [x] Activity as tolerated:    [] Patient has no activity restrictions       [] Strict Bedrest:   [] Remain off Work:       [] May return to full duty work:                                     [] Return to work with restrictions:               [address] [facility] [address] Information: Should you experience any significant changes in your wound(s) or have questions about your wound care, please contact Thais Doyle 26 at [place] [phone] 8:00 am - 4:30. If you need help with your wound outside of these hours and cannot wait until we are again available, contact your PCP or go to the hospital emergency room. PLEASE NOTE: IF YOU ARE UNABLE TO OBTAIN WOUND SUPPLIES, CONTINUE TO USE THE SUPPLIES YOU HAVE AVAILABLE UNTIL YOU ARE ABLE TO REACH US. IT IS MOST IMPORTANT TO KEEP THE WOUND COVERED AT ALL TIMES.     [x] Dr. Cleo Byers   [] Dr. Humera Espino  [] Dr. Rico Little

## 2022-10-19 ENCOUNTER — HOSPITAL ENCOUNTER (OUTPATIENT)
Dept: WOUND CARE | Age: 67
Discharge: HOME | End: 2022-10-19
Payer: MEDICARE

## 2022-10-19 VITALS
OXYGEN SATURATION: 97 % | HEART RATE: 88 BPM | SYSTOLIC BLOOD PRESSURE: 147 MMHG | RESPIRATION RATE: 22 BRPM | DIASTOLIC BLOOD PRESSURE: 75 MMHG | TEMPERATURE: 96 F

## 2022-10-19 DIAGNOSIS — L97.522 ULCER OF LEFT FOOT, WITH FAT LAYER EXPOSED (HCC): ICD-10-CM

## 2022-10-19 DIAGNOSIS — L97.929 IDIOPATHIC CHRONIC VENOUS HYPERTENSION OF LEFT LOWER EXTREMITY WITH ULCER (HCC): ICD-10-CM

## 2022-10-19 DIAGNOSIS — R60.0 LOCALIZED EDEMA: ICD-10-CM

## 2022-10-19 DIAGNOSIS — L97.822 NON-PRESSURE CHRONIC ULCER OF OTHER PART OF LEFT LOWER LEG WITH FAT LAYER EXPOSED (HCC): ICD-10-CM

## 2022-10-19 DIAGNOSIS — L97.412 ULCER OF RIGHT HEEL, WITH FAT LAYER EXPOSED (HCC): ICD-10-CM

## 2022-10-19 DIAGNOSIS — I87.312 IDIOPATHIC CHRONIC VENOUS HYPERTENSION OF LEFT LOWER EXTREMITY WITH ULCER (HCC): ICD-10-CM

## 2022-10-19 DIAGNOSIS — L97.812 NON-PRESSURE CHRONIC ULCER OF OTHER PART OF RIGHT LOWER LEG WITH FAT LAYER EXPOSED (HCC): Primary | ICD-10-CM

## 2022-10-19 PROCEDURE — 74011000250 HC RX REV CODE- 250: Performed by: SPECIALIST

## 2022-10-19 PROCEDURE — 29581 APPL MULTLAYER CMPRN SYS LEG: CPT

## 2022-10-19 RX ORDER — SILVER SULFADIAZINE 10 G/1000G
CREAM TOPICAL ONCE
Status: CANCELLED | OUTPATIENT
Start: 2022-10-19 | End: 2022-10-19

## 2022-10-19 RX ORDER — LIDOCAINE HYDROCHLORIDE 20 MG/ML
15 SOLUTION OROPHARYNGEAL ONCE
Status: CANCELLED | OUTPATIENT
Start: 2022-10-19 | End: 2022-10-19

## 2022-10-19 RX ORDER — LIDOCAINE HYDROCHLORIDE 20 MG/ML
15 SOLUTION OROPHARYNGEAL ONCE
Status: COMPLETED | OUTPATIENT
Start: 2022-10-19 | End: 2022-10-19

## 2022-10-19 RX ORDER — MUPIROCIN 20 MG/G
OINTMENT TOPICAL ONCE
Status: CANCELLED | OUTPATIENT
Start: 2022-10-19 | End: 2022-10-19

## 2022-10-19 RX ADMIN — LIDOCAINE HYDROCHLORIDE 15 ML: 20 SOLUTION ORAL; TOPICAL at 14:21

## 2022-10-19 NOTE — WOUND CARE
Discharge Instructions  [address] [place] [address], [address] [place] [address]  Telephone: [phone]    NAME:  Keri Collins OF BIRTH:  1955  MEDICAL RECORD NUMBER:  [id number]  DATE: 10/19/2022       Return Appointment:  [] Dressing supply provider:   [x] Home Healthcare: [address] [facility]  [x] Return Appointment: 1 Week(s)  [] Nurse Visit:       [] Discharge from [facility]  [] Ordered tests:    [] Referrals:                    [] Rx:        Wound Cleansing:   Do not scrub or use excessive force. Cleanse wound prior to applying a clean dressing with:  [] Normal Saline   [x] Mild Soap & Water/Baby Shampoo    [] Keep Wound Dry in Shower  [] Other:      Topical Treatments:  Do not apply lotions, creams, or ointments to wound bed unless directed. [x] Apply moisturizing lotion to skin surrounding the wound prior to dressing change.  [] Apply antifungal ointment to skin surrounding the wound prior to dressing change.  [] Apply thin film of moisture barrier ointment to skin immediately around wound. [] Other:  Betadine to israel-wound     Dressings:           Wound Location: Left leg (Right Leg - healed)  [x] Apply Primary Dressing:       [] MediHoney Gel    [] MediHoney Alginate               [] Calcium Alginate      [x] Calcium Alginate with silver- Silvercel   [] Collagen                   [] Collagen with Silver                [] Santyl with Moistened saline gauze              [] Hydrofera Blue (cut to size and moistened)  [] Hydrofera Blue Ready (Cut to size)   [] NS wet to dry    [] Betadine wet to dry              [] Hydrogel                [] Mepitel     [] Bactroban/Mupirocin               [] Other:     [x] Cover and Secure with:     [x] Gauze [] Bobbe Colander [] Kerlix   [] Foam [] Super Absorbant [] ABD     [] ACE Wrap            [] Other:    Avoid contact of tape with skin.     [x] Change dressing: [] Daily    [] Every Other Day [] Once per week   [x] Twice per week   [] Three times per week [] Do Not Change Dressing   [] Other:      Compression:  Apply: [x] Multilayer Compression Wrap: [x]RightLeg [x]Left Leg                                 []Profore   [x] profore lite  (coflex lite)        []Unna     [x] Do not get leg(s) with wrap wet. If wraps become too tight call the center or completely remove the wrap. [x] Elevate leg(s) above the level of the heart when sitting. [x] Avoid prolonged standing in one place. Dietary:  [] Diet as tolerated: [x] Calorie Diabetic Diet: [x] No Added Salt:  [x] Increase Protein: [] Other:     Activity:  [x] Activity as tolerated:    [] Patient has no activity restrictions       [] Strict Bedrest:   [] Remain off Work:       [] May return to full duty work:                                     [] Return to work with restrictions:               [address] [facility] [address] Information: Should you experience any significant changes in your wound(s) or have questions about your wound care, please contact Thais Doyle 26 at [place] [phone] 8:00 am - 4:30. If you need help with your wound outside of these hours and cannot wait until we are again available, contact your PCP or go to the hospital emergency room. PLEASE NOTE: IF YOU ARE UNABLE TO OBTAIN WOUND SUPPLIES, CONTINUE TO USE THE SUPPLIES YOU HAVE AVAILABLE UNTIL YOU ARE ABLE TO REACH US. IT IS MOST IMPORTANT TO KEEP THE WOUND COVERED AT ALL TIMES.     [x] Dr. Yuni Ortiz   [] Dr. Indra Mike  [] Dr. Mirella Haley

## 2022-10-19 NOTE — WOUND CARE
Multilayer Compression Wrap   (Not Unna) Below the Knee    NAME:  Gregorio Galloway OF BIRTH:  1955  MEDICAL RECORD NUMBER:  [id number]  DATE:  10/19/2022    Removed old Multilayer wrap if indicated and wash leg with mild soap/water. Applied moisturizing agent to dry skin as needed. Applied primary and secondary dressing as ordered. Applied multilayered dressing below the knee to right lower leg. Applied multilayered dressing below the knee to left lower leg. Instructed patient/caregiver not to remove dressing and to keep it clean and dry. Instructed patient/caregiver on complications to report to provider, such as pain, numbness in toes, heavy drainage, and slippage of dressing. Instructed patient on purpose of compression dressing and on activity and exercise recommendations.     Response to treatment: Well tolerated by patient       Electronically signed by Lupe Grayson LPN on 51/79/4804 at 2:33 PM

## 2022-10-19 NOTE — WOUND CARE
[address]   Progress Note and Procedure Note   NO Procedure      [address] RECORD NUMBER:  [id number]  AGE: 78 y.o. RACE WHITE/NON-  GENDER: male  : 1955  EPISODE DATE:  10/19/2022    Subjective:   No new problems reported  No pain, minimal drainage  Chief Complaint   Patient presents with    Wound Check     L & R leg         HISTORY of PRESENT ILLNESS JAZZY Mcpherson is a 78 y.o. male who presents today for wound/ulcer evaluation.    History of Wound Context: LLE  Wound/Ulcer Pain Timing/Severity: mild  Quality of pain: aching  Severity:  1 / 10   Modifying Factors: Pain is relieved/improved with rest  Associated Signs/Symptoms: edema    Ulcer Identification:  Ulcer Type: venous    Contributing Factors: lymphedema    Wound: LLE         PAST MEDICAL HISTORY    Past Medical History:   Diagnosis Date    Arthritis     CAD (coronary artery disease)     Chronic obstructive pulmonary disease (HCC)     Diabetes (HCC)     Gout     Hypertension     Ill-defined condition     Sleep apnea         PAST SURGICAL HISTORY    Past Surgical History:   Procedure Laterality Date    HX ORTHOPAEDIC      HX PACEMAKER      HX VEIN ABLATION ADHESIVE         FAMILY HISTORY    Family History   Problem Relation Age of Onset    Heart Disease Mother     Kidney Disease Mother     Hypertension Mother     Diabetes Mother     Heart Disease Father     Hypertension Father     Diabetes Sister     Diabetes Brother        SOCIAL HISTORY    Social History     Tobacco Use    Smoking status: Never    Smokeless tobacco: Never   Vaping Use    Vaping Use: Never used   Substance Use Topics    Alcohol use: Not Currently    Drug use: Never       ALLERGIES    Allergies   Allergen Reactions    Elavil Angioedema    Naproxen Unknown (comments)     Pt not sure of reaction    Sulfa (Sulfonamide Antibiotics) Nausea and Vomiting       MEDICATIONS    Current Outpatient Medications on File Prior to Encounter   Medication Sig Dispense Refill    oxyCODONE IR (ROXICODONE) 10 mg tab immediate release tablet Take 10 mg by mouth every eight (8) hours as needed for Pain. insulin glargine (LANTUS) 100 unit/mL injection 27-unit subcu at bedtime 1 mL 2    insulin glargine,hum.rec.anlog (TOUJEO MAX U-300 SOLOSTAR SC) 80 Units by SubCUTAneous route two (2) times a day. 80 units in am, 34 units in pm      b complex vitamins tablet Take 1 Tablet by mouth daily. ammonium lactate (AMLACTIN) 12 % topical cream Apply  to affected area as needed for Other (xerosis). rub in to affected area well  Indications: dry skin 280 g 0    vitamin E, dl,tocopheryl acet, (vitamin E acetate) 400 unit capsule Take 2 Capsules by mouth two (2) times a day. 100 Capsule 0    ascorbic acid, vitamin C, (VITAMIN C) 500 mg tablet Take 1,000 mg by mouth two (2) times a day. Indications: inadequate vitamin C      tamsulosin (FLOMAX) 0.4 mg capsule Take 0.8 mg by mouth daily. tiotropium (SPIRIVA) 18 mcg inhalation capsule Take 1 Capsule by inhalation daily. bumetanide (BUMEX) 2 mg tablet Take 2 mg by mouth two (2) times a day. ferrous sulfate (Iron) 325 mg (65 mg iron) tablet Take  by mouth two (2) times daily (after meals). aspirin 81 mg chewable tablet Take 81 mg by mouth daily. flunisolide (NASAREL) 25 mcg (0.025 %) spry 2 Sprays two (2) times a day. albuterol (PROVENTIL HFA, VENTOLIN HFA, PROAIR HFA) 90 mcg/actuation inhaler Take 1 Puff by inhalation every four (4) hours as needed for Wheezing or Shortness of Breath. colchicine 0.6 mg tablet Take 0.6 mg by mouth daily. gabapentin (NEURONTIN) 300 mg capsule Take 300 mg by mouth four (4) times daily. insulin lispro (HUMALOG) 100 unit/mL injection by SubCUTAneous route. Sliding scale       No current facility-administered medications on file prior to encounter. REVIEW OF SYSTEMS    Pertinent items are noted in HPI.     Objective:     Visit Vitals  BP (!) 147/75 (BP 1 Location: Right lower arm, BP Patient Position: At rest;Sitting)   Pulse 88   Temp (!) 96 °F (35.6 °C)   Resp 22   SpO2 97%       Wt Readings from Last 3 Encounters:   07/25/22 157.4 kg (347 lb)   07/21/22 157.4 kg (347 lb)   07/11/22 157.4 kg (347 lb)       PHYSICAL EXAM  R leg wounds closed  L leg wound small, minimal drainage, no infection  Pertinent items are noted in HPI.     Assessment:    R healed, L almost healed  Problem List Items Addressed This Visit       Idiopathic chronic venous hypertension of left lower extremity with ulcer (HCC)    Relevant Medications    lidocaine (XYLOCAINE) 2 % viscous solution 15 mL (Completed) (Start on 10/19/2022  3:00 PM)    Other Relevant Orders    INITIATE OUTPATIENT WOUND CARE PROTOCOL    Localized edema    Relevant Medications    lidocaine (XYLOCAINE) 2 % viscous solution 15 mL (Completed) (Start on 10/19/2022  3:00 PM)    Other Relevant Orders    INITIATE OUTPATIENT WOUND CARE PROTOCOL    Non-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 ([address]    Relevant Medications    lidocaine (XYLOCAINE) 2 % viscous solution 15 mL (Completed) (Start on 10/19/2022  3:00 PM)    Other Relevant Orders    INITIATE OUTPATIENT WOUND CARE PROTOCOL    Ulcer of left foot, with fat layer exposed ([address]    Relevant Medications    lidocaine (XYLOCAINE) 2 % viscous solution 15 mL (Completed) (Start on 10/19/2022  3:00 PM)    Other Relevant Orders    INITIATE OUTPATIENT WOUND CARE PROTOCOL    Ulcer of right heel, with fat layer exposed (HCC)    Relevant Medications    lidocaine (XYLOCAINE) 2 % viscous solution 15 mL (Completed) (Start on 10/19/2022  3:00 PM)    Other Relevant Orders    INITIATE OUTPATIENT WOUND CARE PROTOCOL    Non-pressure chronic ulcer of other part of right lower leg with fat layer exposed ([address] - Primary    Relevant Medications    lidocaine (XYLOCAINE) 2 % viscous solution 15 mL (Completed) (Start on 10/19/2022  3:00 PM) Other Relevant Orders    INITIATE OUTPATIENT WOUND CARE PROTOCOL       Procedure Note  Indications:  Based on my examination of this patient's wound(s)/ulcer(s) today, debridement is not required to promote healing and evaluate the wound base. Wound Leg lower Left;Lateral #1 (Active)   Wound Image   10/19/22 1417   Wound Etiology Venous 10/19/22 1417   Dressing Status Clean;Dry; Intact 10/19/22 1417   Cleansed Soap and water 10/19/22 1417   Wound Length (cm) 1 cm 10/19/22 1417   Wound Width (cm) 1 cm 10/19/22 1417   Wound Depth (cm) 0.1 cm 10/19/22 1417   Wound Surface Area (cm^2) 1 cm^2 10/19/22 1417   Change in Wound Size % 99.09 10/19/22 1417   Wound Volume (cm^3) 0.1 cm^3 10/19/22 1417   Wound Healing % 99 10/19/22 1417   Post-Procedure Length (cm) 10.2 cm 07/20/22 1519   Post-Procedure Width (cm) 5 cm 07/20/22 1519   Post-Procedure Depth (cm) 0.1 cm 07/20/22 1519   Post-Procedure Surface Area (cm^2) 51 cm^2 07/20/22 1519   Post-Procedure Volume (cm^3) 5.1 cm^3 07/20/22 1519   Wound Assessment Slough;Pink/red;Granulation tissue 10/19/22 1417   Drainage Amount Small 10/19/22 1417   Drainage Description Serosanguinous 10/19/22 1417   Wound Odor None 10/19/22 1417   Alissa-Wound/Incision Assessment Intact 10/19/22 1417   Edges Attached edges 10/19/22 1417   Wound Thickness Description Full thickness 10/19/22 1417   Number of days: 231        Plan:   Silvercel, ABD to LLE, coflex lite to both legs    Treatment Note please see attached Discharge Instructions    Written patient dismissal instructions given to patient or POA.          Electronically signed by Merline Arrow, MD on 10/19/2022 at 2:33 PM

## 2022-10-26 ENCOUNTER — HOSPITAL ENCOUNTER (OUTPATIENT)
Dept: WOUND CARE | Age: 67
Discharge: HOME | End: 2022-10-26
Payer: MEDICARE

## 2022-10-26 VITALS
DIASTOLIC BLOOD PRESSURE: 50 MMHG | SYSTOLIC BLOOD PRESSURE: 99 MMHG | HEART RATE: 80 BPM | OXYGEN SATURATION: 99 % | TEMPERATURE: 97.8 F | RESPIRATION RATE: 22 BRPM

## 2022-10-26 DIAGNOSIS — L97.929 IDIOPATHIC CHRONIC VENOUS HYPERTENSION OF LEFT LOWER EXTREMITY WITH ULCER (HCC): ICD-10-CM

## 2022-10-26 DIAGNOSIS — L97.522 ULCER OF LEFT FOOT, WITH FAT LAYER EXPOSED (HCC): ICD-10-CM

## 2022-10-26 DIAGNOSIS — R60.0 LOCALIZED EDEMA: ICD-10-CM

## 2022-10-26 DIAGNOSIS — I87.312 IDIOPATHIC CHRONIC VENOUS HYPERTENSION OF LEFT LOWER EXTREMITY WITH ULCER (HCC): ICD-10-CM

## 2022-10-26 DIAGNOSIS — L97.822 NON-PRESSURE CHRONIC ULCER OF OTHER PART OF LEFT LOWER LEG WITH FAT LAYER EXPOSED (HCC): ICD-10-CM

## 2022-10-26 DIAGNOSIS — L97.412 ULCER OF RIGHT HEEL, WITH FAT LAYER EXPOSED (HCC): ICD-10-CM

## 2022-10-26 DIAGNOSIS — L97.812 NON-PRESSURE CHRONIC ULCER OF OTHER PART OF RIGHT LOWER LEG WITH FAT LAYER EXPOSED (HCC): Primary | ICD-10-CM

## 2022-10-26 PROCEDURE — 29581 APPL MULTLAYER CMPRN SYS LEG: CPT

## 2022-10-26 RX ORDER — LIDOCAINE HYDROCHLORIDE 20 MG/ML
15 SOLUTION OROPHARYNGEAL ONCE
Status: CANCELLED | OUTPATIENT
Start: 2022-10-26 | End: 2022-10-26

## 2022-10-26 RX ORDER — SILVER SULFADIAZINE 10 G/1000G
CREAM TOPICAL ONCE
Status: CANCELLED | OUTPATIENT
Start: 2022-10-26 | End: 2022-10-26

## 2022-10-26 RX ORDER — MUPIROCIN 20 MG/G
OINTMENT TOPICAL ONCE
Status: CANCELLED | OUTPATIENT
Start: 2022-10-26 | End: 2022-10-26

## 2022-10-26 NOTE — WOUND CARE
Multilayer Compression Wrap   (Not Unna) Below the Knee    NAME:  Thelma Fox OF BIRTH:  1955  MEDICAL RECORD NUMBER:  [id number]  DATE:  10/26/2022    Removed old Multilayer wrap if indicated and wash leg with mild soap/water. Applied moisturizing agent to dry skin as needed. Applied primary and secondary dressing as ordered. Applied multilayered dressing below the knee to left lower leg. Instructed patient/caregiver not to remove dressing and to keep it clean and dry. Instructed patient/caregiver on complications to report to provider, such as pain, numbness in toes, heavy drainage, and slippage of dressing. Instructed patient on purpose of compression dressing and on activity and exercise recommendations.     Response to treatment: Well tolerated by patient       Electronically signed by Mateus Livingston LPN on 51/01/8082 at 2:42 PM

## 2022-10-26 NOTE — WOUND CARE
Discharge Instructions  [address] [place] [address], [address] [place] [address]  Telephone: [phone]    NAME:  Thelma Fox OF BIRTH:  1955  MEDICAL RECORD NUMBER:  [id number]  DATE: 10/26/2022       Return Appointment:  [] Dressing supply provider:   [x] [facility] [address] [facility] [address] [facility]  [x] Return Appointment: 1 Week(s)  [] Nurse Visit:       [] Discharge from [facility]  [] Ordered tests:    [] Referrals:                    [] Rx:        Wound Cleansing:   Do not scrub or use excessive force. Cleanse wound prior to applying a clean dressing with:  [x] Normal Saline   [x] Mild Soap & Water/Baby Shampoo    [] Keep Wound Dry in Shower  [] Other:      Topical Treatments:  Do not apply lotions, creams, or ointments to wound bed unless directed. [x] Apply moisturizing lotion to skin surrounding the wound prior to dressing change.  [] Apply antifungal ointment to skin surrounding the wound prior to dressing change.  [] Apply thin film of moisture barrier ointment to skin immediately around wound. [] Other:  Betadine to israel-wound     Dressings:           Wound Location: Left leg (Right Leg - healed)  [x] Apply Primary Dressing:       [] MediHoney Gel    [] MediHoney Alginate               [] Calcium Alginate      [x] Calcium Alginate with silver- Silvercel   [] Collagen                   [] Collagen with Silver                [] Santyl with Moistened saline gauze              [] Hydrofera Blue (cut to size and moistened)  [] Hydrofera Blue Ready (Cut to size)   [] NS wet to dry    [] Betadine wet to dry              [] Hydrogel                [] Mepitel     [] Bactroban/Mupirocin               [] Other:     [] Cover and Secure with:     [x] Gauze [x] Cristian [] Kerlix   [] Foam [] Super Absorbant [] ABD     [] ACE Wrap            [] Other:    Avoid contact of tape with skin.     [x] Change dressing: [] Daily    [] Every Other Day [] Once per week   [x] Twice per week   [] Three times per week [] Do Not Change Dressing   [] Other:      Compression:  Apply: [x] Multilayer Compression Wrap: [x]RightLeg [x]Left Leg                                 []Profore   [x] profore lite  (coflex lite)        []Unna     [x] Do not get leg(s) with wrap wet. If wraps become too tight call the center or completely remove the wrap. [x] Elevate leg(s) above the level of the heart when sitting. [x] Avoid prolonged standing in one place. Dietary:  [] Diet as tolerated: [x] Calorie Diabetic Diet: [x] No Added Salt:  [x] Increase Protein: [] Other:     Activity:  [x] Activity as tolerated:    [] Patient has no activity restrictions       [] Strict Bedrest:   [] Remain off Work:       [] May return to full duty work:                                     [] Return to work with restrictions:               [address] Information: Should you experience any significant changes in your wound(s) or have questions about your wound care, please contact [address] at [place] [id number] 8:00 am - 4:30. If you need help with your wound outside of these hours and cannot wait until we are again available, contact your PCP or go to the hospital emergency room. PLEASE NOTE: IF YOU ARE UNABLE TO OBTAIN WOUND SUPPLIES, CONTINUE TO USE THE SUPPLIES YOU HAVE AVAILABLE UNTIL YOU ARE ABLE TO REACH US. IT IS MOST IMPORTANT TO KEEP THE WOUND COVERED AT ALL TIMES.     [x] Dr. Merry Lanes   [] Dr. Ella Rand  [] Dr. Delio Jackson

## 2022-10-26 NOTE — DISCHARGE INSTRUCTIONS
Discharge Instructions  [address] [place] [address], [address] [place] [address]  Telephone: [phone]    NAME:  Winnie Simpson OF BIRTH:  1955  MEDICAL RECORD NUMBER:  [id number]  DATE: 10/26/2022       Return Appointment:  [] Dressing supply provider:   [x] [facility] [address] [facility] [facility]  [x] Return Appointment: 1 Week(s)  [] Nurse Visit:       [] [facility]charge from [facility]  [] Ordered tests:    [] Referrals:                    [] Rx:        Wound Cleansing:   Do not scrub or use excessive force. Cleanse wound prior to applying a clean dressing with:  [x] Normal Saline   [x] Mild Soap & Water/Baby Shampoo    [] Keep Wound Dry in Shower  [] Other:      Topical Treatments:  Do not apply lotions, creams, or ointments to wound bed unless directed. [x] Apply moisturizing lotion to skin surrounding the wound prior to dressing change.  [] Apply antifungal ointment to skin surrounding the wound prior to dressing change.  [] Apply thin film of moisture barrier ointment to skin immediately around wound. [] Other:  Betadine to israel-wound     Dressings:           Wound Location: Left leg (Right Leg - healed)  [x] Apply Primary Dressing:       [] MediHoney Gel    [] MediHoney Alginate               [] Calcium Alginate      [x] Calcium Alginate with silver- Silvercel   [] Collagen                   [] Collagen with Silver                [] Santyl with Moistened saline gauze              [] Hydrofera Blue (cut to size and moistened)  [] Hydrofera Blue Ready (Cut to size)   [] NS wet to dry    [] Betadine wet to dry              [] Hydrogel                [] Mepitel     [] Bactroban/Mupirocin               [] Other:     [] Cover and Secure with:     [x] Gauze [x] Cristian [] Kerlix   [] Foam [] Super Absorbant [] ABD     [] ACE Wrap            [] Other:    Avoid contact of tape with skin.     [x] Change dressing: [] Daily    [] Every Other Day [] Once per week   [x] Twice per week   [] Three times per week [] Do Not Change Dressing   [] Other:      Compression:  Apply: [x] Multilayer Compression Wrap: [x]RightLeg [x]Left Leg                                 []Profore   [x] profore lite  (coflex lite)        []Unna     [x] Do not get leg(s) with wrap wet. If wraps become too tight call the center or completely remove the wrap. [x] Elevate leg(s) above the level of the heart when sitting. [x] Avoid prolonged standing in one place. Dietary:  [] Diet as tolerated: [x] Calorie Diabetic Diet: [x] No Added Salt:  [x] Increase Protein: [] Other:     Activity:  [x] Activity as tolerated:    [] Patient has no activity restrictions       [] Strict Bedrest:   [] Remain off Work:       [] May return to full duty work:                                     [] Return to work with restrictions:               [address] Information: Should you experience any significant changes in your wound(s) or have questions about your wound care, please contact Thais Doyle [address] at [place] [address] 8:00 am - 4:30. If you need help with your wound outside of these hours and cannot wait until we are again available, contact your PCP or go to the hospital emergency room. PLEASE NOTE: IF YOU ARE UNABLE TO OBTAIN WOUND SUPPLIES, CONTINUE TO USE THE SUPPLIES YOU HAVE AVAILABLE UNTIL YOU ARE ABLE TO REACH US. IT IS MOST IMPORTANT TO KEEP THE WOUND COVERED AT ALL TIMES.     [x] Dr. Kyle Carroll   [] Dr. Nazario Paz  [] Dr. Sayra Wright

## 2022-10-26 NOTE — WOUND CARE
[address]   Progress Note and Procedure Note   NO Procedure      [address] RECORD NUMBER:  [id number]  AGE: 78 y.o. RACE WHITE/NON-  GENDER: male  : 1955  EPISODE DATE:  10/26/2022    Subjective:   No new problems reported  Chief Complaint   Patient presents with    Wound Check     Left leg         HISTORY of PRESENT ILLNESS JAZZY Palacio is a 78 y.o. male who presents today for wound/ulcer evaluation.    History of Wound Context: LLE  Wound/Ulcer Pain Timing/Severity: none  Quality of pain: dull  Severity:  0 / 10   Modifying Factors: None  Associated Signs/Symptoms: edema    Ulcer Identification:  Ulcer Type: venous    Contributing Factors: lymphedema    Wound: LLE         PAST MEDICAL HISTORY    Past Medical History:   Diagnosis Date    Arthritis     CAD (coronary artery disease)     Chronic obstructive pulmonary disease (HCC)     Diabetes (HCC)     Gout     Hypertension     Ill-defined condition     Sleep apnea         PAST SURGICAL HISTORY    Past Surgical History:   Procedure Laterality Date    HX ORTHOPAEDIC      HX PACEMAKER      HX VEIN ABLATION ADHESIVE         FAMILY HISTORY    Family History   Problem Relation Age of Onset    Heart Disease Mother     Kidney Disease Mother     Hypertension Mother     Diabetes Mother     Heart Disease Father     Hypertension Father     Diabetes Sister     Diabetes Brother        SOCIAL HISTORY    Social History     Tobacco Use    Smoking status: Never    Smokeless tobacco: Never   Vaping Use    Vaping Use: Never used   Substance Use Topics    Alcohol use: Not Currently    Drug use: Never       ALLERGIES    Allergies   Allergen Reactions    Elavil Angioedema    Naproxen Unknown (comments)     Pt not sure of reaction    Sulfa (Sulfonamide Antibiotics) Nausea and Vomiting       MEDICATIONS    Current Outpatient Medications on File Prior to Encounter   Medication Sig Dispense Refill    oxyCODONE IR (ROXICODONE) 10 mg tab immediate release tablet Take 10 mg by mouth every eight (8) hours as needed for Pain. insulin glargine (LANTUS) 100 unit/mL injection 27-unit subcu at bedtime 1 mL 2    insulin glargine,hum.rec.anlog (TOUJEO MAX U-300 SOLOSTAR SC) 80 Units by SubCUTAneous route two (2) times a day. 80 units in am, 34 units in pm      b complex vitamins tablet Take 1 Tablet by mouth daily. ammonium lactate (AMLACTIN) 12 % topical cream Apply  to affected area as needed for Other (xerosis). rub in to affected area well  Indications: dry skin 280 g 0    vitamin E, dl,tocopheryl acet, (vitamin E acetate) 400 unit capsule Take 2 Capsules by mouth two (2) times a day. 100 Capsule 0    ascorbic acid, vitamin C, (VITAMIN C) 500 mg tablet Take 1,000 mg by mouth two (2) times a day. Indications: inadequate vitamin C      tamsulosin (FLOMAX) 0.4 mg capsule Take 0.8 mg by mouth daily. tiotropium (SPIRIVA) 18 mcg inhalation capsule Take 1 Capsule by inhalation daily. bumetanide (BUMEX) 2 mg tablet Take 2 mg by mouth two (2) times a day. ferrous sulfate (Iron) 325 mg (65 mg iron) tablet Take  by mouth two (2) times daily (after meals). aspirin 81 mg chewable tablet Take 81 mg by mouth daily. flunisolide (NASAREL) 25 mcg (0.025 %) spry 2 Sprays two (2) times a day. albuterol (PROVENTIL HFA, VENTOLIN HFA, PROAIR HFA) 90 mcg/actuation inhaler Take 1 Puff by inhalation every four (4) hours as needed for Wheezing or Shortness of Breath. colchicine 0.6 mg tablet Take 0.6 mg by mouth daily. gabapentin (NEURONTIN) 300 mg capsule Take 300 mg by mouth four (4) times daily. insulin lispro (HUMALOG) 100 unit/mL injection by SubCUTAneous route. Sliding scale       No current facility-administered medications on file prior to encounter. REVIEW OF SYSTEMS    Pertinent items are noted in HPI.     Objective:     Visit Vitals  BP (!) 99/50 (BP 1 Location: Left arm, BP Patient Position: At rest;Sitting)   Pulse 80   Temp 97.8 °F (36.6 °C)   Resp 22   SpO2 99%       Wt Readings from Last 3 Encounters:   07/25/22 157.4 kg (347 lb)   07/21/22 157.4 kg (347 lb)   07/11/22 157.4 kg (347 lb)       PHYSICAL EXAM  Right leg wounds have healed  Left leg wound is very small, healthy granulation, no infection  Pertinent items are noted in HPI. Assessment:   Much improved, almost completely healed  Problem List Items Addressed This Visit       Idiopathic chronic venous hypertension of left lower extremity with ulcer ([address]    Relevant Orders    INITIATE OUTPATIENT WOUND CARE PROTOCOL    Localized edema    Relevant Orders    INITIATE OUTPATIENT WOUND CARE PROTOCOL    Non-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 ([address]    Relevant Orders    INITIATE OUTPATIENT WOUND CARE PROTOCOL    Ulcer of left foot, with fat layer exposed ([address]    Relevant Orders    INITIATE OUTPATIENT WOUND CARE PROTOCOL    Ulcer of right heel, with fat layer exposed ([address]    Relevant Orders    INITIATE OUTPATIENT WOUND CARE PROTOCOL    Non-pressure chronic ulcer of other part of right lower leg with fat layer exposed ([address] - Primary    Relevant Orders    INITIATE OUTPATIENT WOUND CARE PROTOCOL       Procedure Note  Indications:  Based on my examination of this patient's wound(s)/ulcer(s) today, debridement is not required to promote healing and evaluate the wound base. Wound Leg lower Left;Lateral #1 (Active)   Wound Image   10/26/22 1415   Wound Etiology Venous 10/26/22 1415   Dressing Status Clean;Dry; Intact 10/26/22 1415   Cleansed Soap and water 10/26/22 1415   Wound Length (cm) 1 cm 10/26/22 1415   Wound Width (cm) 0.4 cm 10/26/22 1415   Wound Depth (cm) 0.1 cm 10/26/22 1415   Wound Surface Area (cm^2) 0.4 cm^2 10/26/22 1415   Change in Wound Size % 99.64 10/26/22 1415   Wound Volume (cm^3) 0.04 cm^3 10/26/22 1415   Wound Healing % 100 10/26/22 1415   Post-Procedure Length (cm) 10.2 cm 07/20/22 1519   Post-Procedure Width (cm) 5 cm 07/20/22 1519   Post-Procedure Depth (cm) 0.1 cm 07/20/22 1519   Post-Procedure Surface Area (cm^2) 51 cm^2 07/20/22 1519   Post-Procedure Volume (cm^3) 5.1 cm^3 07/20/22 1519   Wound Assessment Slough;Pink/red;Granulation tissue 10/26/22 1415   Drainage Amount Small 10/26/22 1415   Drainage Description Serosanguinous 10/26/22 1415   Wound Odor None 10/26/22 1415   Alissa-Wound/Incision Assessment Intact 10/26/22 1415   Edges Attached edges 10/26/22 1415   Wound Thickness Description Full thickness 10/26/22 1415   Number of days: 238        Plan:   Silver cell to the left leg wound with a Coflex lite wrap  Double Tubigrip to the right lower extremity    Treatment Note please see attached Discharge Instructions    Written patient dismissal instructions given to patient or POA.          Electronically signed by Farideh Cm MD on 10/26/2022 at 2:31 PM

## 2022-11-02 ENCOUNTER — HOSPITAL ENCOUNTER (OUTPATIENT)
Dept: WOUND CARE | Age: 67
Discharge: HOME | End: 2022-11-02
Payer: MEDICARE

## 2022-11-02 VITALS
TEMPERATURE: 98.2 F | RESPIRATION RATE: 22 BRPM | HEART RATE: 97 BPM | SYSTOLIC BLOOD PRESSURE: 152 MMHG | DIASTOLIC BLOOD PRESSURE: 79 MMHG | OXYGEN SATURATION: 97 %

## 2022-11-02 DIAGNOSIS — R60.0 LOCALIZED EDEMA: ICD-10-CM

## 2022-11-02 DIAGNOSIS — L97.929 IDIOPATHIC CHRONIC VENOUS HYPERTENSION OF LEFT LOWER EXTREMITY WITH ULCER (HCC): ICD-10-CM

## 2022-11-02 DIAGNOSIS — L97.522 ULCER OF LEFT FOOT, WITH FAT LAYER EXPOSED (HCC): ICD-10-CM

## 2022-11-02 DIAGNOSIS — L97.812 NON-PRESSURE CHRONIC ULCER OF OTHER PART OF RIGHT LOWER LEG WITH FAT LAYER EXPOSED (HCC): Primary | ICD-10-CM

## 2022-11-02 DIAGNOSIS — I87.312 IDIOPATHIC CHRONIC VENOUS HYPERTENSION OF LEFT LOWER EXTREMITY WITH ULCER (HCC): ICD-10-CM

## 2022-11-02 DIAGNOSIS — L97.822 NON-PRESSURE CHRONIC ULCER OF OTHER PART OF LEFT LOWER LEG WITH FAT LAYER EXPOSED (HCC): ICD-10-CM

## 2022-11-02 DIAGNOSIS — L97.412 ULCER OF RIGHT HEEL, WITH FAT LAYER EXPOSED (HCC): ICD-10-CM

## 2022-11-02 PROCEDURE — 29581 APPL MULTLAYER CMPRN SYS LEG: CPT

## 2022-11-02 RX ORDER — MUPIROCIN 20 MG/G
OINTMENT TOPICAL ONCE
Status: CANCELLED | OUTPATIENT
Start: 2022-11-02 | End: 2022-11-02

## 2022-11-02 RX ORDER — LIDOCAINE HYDROCHLORIDE 20 MG/ML
15 SOLUTION OROPHARYNGEAL ONCE
Status: CANCELLED | OUTPATIENT
Start: 2022-11-02 | End: 2022-11-02

## 2022-11-02 RX ORDER — SILVER SULFADIAZINE 10 G/1000G
CREAM TOPICAL ONCE
Status: CANCELLED | OUTPATIENT
Start: 2022-11-02 | End: 2022-11-02

## 2022-11-02 NOTE — WOUND CARE
[address]   Progress Note and Procedure Note   NO Procedure      [address] RECORD NUMBER:  [id number]  AGE: 78 y.o. RACE WHITE/NON-  GENDER: male  : 1955  EPISODE DATE:  2022    Subjective:   No new problems  Chief Complaint   Patient presents with    Wound Check     L leg         HISTORY of PRESENT ILLNESS HPI    Ron Rosario is a 78 y.o. male who presents today for wound/ulcer evaluation.    History of Wound Context: LLE  Wound/Ulcer Pain Timing/Severity: none  Quality of pain: dull  Severity:  0 / 10   Modifying Factors: None  Associated Signs/Symptoms: edema    Ulcer Identification:  Ulcer Type: venous    Contributing Factors: lymphedema    Wound: LLE         PAST MEDICAL HISTORY    Past Medical History:   Diagnosis Date    Arthritis     CAD (coronary artery disease)     Chronic obstructive pulmonary disease (HCC)     Diabetes (HCC)     Gout     Hypertension     Ill-defined condition     Sleep apnea         PAST SURGICAL HISTORY    Past Surgical History:   Procedure Laterality Date    HX ORTHOPAEDIC      HX PACEMAKER      HX VEIN ABLATION ADHESIVE         FAMILY HISTORY    Family History   Problem Relation Age of Onset    Heart Disease Mother     Kidney Disease Mother     Hypertension Mother     Diabetes Mother     Heart Disease Father     Hypertension Father     Diabetes Sister     Diabetes Brother        SOCIAL HISTORY    Social History     Tobacco Use    Smoking status: Never    Smokeless tobacco: Never   Vaping Use    Vaping Use: Never used   Substance Use Topics    Alcohol use: Not Currently    Drug use: Never       ALLERGIES    Allergies   Allergen Reactions    Elavil Angioedema    Naproxen Unknown (comments)     Pt not sure of reaction    Sulfa (Sulfonamide Antibiotics) Nausea and Vomiting       MEDICATIONS    Current Outpatient Medications on File Prior to Encounter   Medication Sig Dispense Refill    oxyCODONE IR (ROXICODONE) 10 mg tab immediate release tablet Take 10 mg by mouth every eight (8) hours as needed for Pain. insulin glargine (LANTUS) 100 unit/mL injection 27-unit subcu at bedtime 1 mL 2    insulin glargine,hum.rec.anlog (TOUJEO MAX U-300 SOLOSTAR SC) 80 Units by SubCUTAneous route two (2) times a day. 80 units in am, 34 units in pm      b complex vitamins tablet Take 1 Tablet by mouth daily. ammonium lactate (AMLACTIN) 12 % topical cream Apply  to affected area as needed for Other (xerosis). rub in to affected area well  Indications: dry skin 280 g 0    vitamin E, dl,tocopheryl acet, (vitamin E acetate) 400 unit capsule Take 2 Capsules by mouth two (2) times a day. 100 Capsule 0    ascorbic acid, vitamin C, (VITAMIN C) 500 mg tablet Take 1,000 mg by mouth two (2) times a day. Indications: inadequate vitamin C      tamsulosin (FLOMAX) 0.4 mg capsule Take 0.8 mg by mouth daily. tiotropium (SPIRIVA) 18 mcg inhalation capsule Take 1 Capsule by inhalation daily. bumetanide (BUMEX) 2 mg tablet Take 2 mg by mouth two (2) times a day. ferrous sulfate (Iron) 325 mg (65 mg iron) tablet Take  by mouth two (2) times daily (after meals). aspirin 81 mg chewable tablet Take 81 mg by mouth daily. flunisolide (NASAREL) 25 mcg (0.025 %) spry 2 Sprays two (2) times a day. albuterol (PROVENTIL HFA, VENTOLIN HFA, PROAIR HFA) 90 mcg/actuation inhaler Take 1 Puff by inhalation every four (4) hours as needed for Wheezing or Shortness of Breath. colchicine 0.6 mg tablet Take 0.6 mg by mouth daily. gabapentin (NEURONTIN) 300 mg capsule Take 300 mg by mouth four (4) times daily. insulin lispro (HUMALOG) 100 unit/mL injection by SubCUTAneous route. Sliding scale       No current facility-administered medications on file prior to encounter. REVIEW OF SYSTEMS    Pertinent items are noted in HPI. Objective:     Visit Vitals  BP (!) 152/79 (BP 1 Location: Right lower arm, BP Patient Position:  At rest;Sitting)   Pulse 97   Temp 98.2 °F (36.8 °C)   Resp 22   SpO2 97%       Wt Readings from Last 3 Encounters:   07/25/22 157.4 kg (347 lb)   07/21/22 157.4 kg (347 lb)   07/11/22 157.4 kg (347 lb)       PHYSICAL EXAM  LLE wound is very small, but very slightly larger than last visit  Pertinent items are noted in HPI. Assessment:    Almost healed  Problem List Items Addressed This Visit       Idiopathic chronic venous hypertension of left lower extremity with ulcer ([address]    Relevant Orders    INITIATE OUTPATIENT WOUND CARE PROTOCOL    Localized edema    Relevant Orders    INITIATE OUTPATIENT WOUND CARE PROTOCOL    Non-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 ([address]    Relevant Orders    INITIATE OUTPATIENT WOUND CARE PROTOCOL    Ulcer of left foot, with fat layer exposed ([address]    Relevant Orders    INITIATE OUTPATIENT WOUND CARE PROTOCOL    Ulcer of right heel, with fat layer exposed ([address]    Relevant Orders    INITIATE OUTPATIENT WOUND CARE PROTOCOL    Non-pressure chronic ulcer of other part of right lower leg with fat layer exposed ([address] - Primary    Relevant Orders    INITIATE OUTPATIENT WOUND CARE PROTOCOL       Procedure Note  Indications:  Based on my examination of this patient's wound(s)/ulcer(s) today, debridement is not required to promote healing and evaluate the wound base. Wound Leg lower Left;Lateral #1 (Active)   Wound Image   11/02/22 1418   Wound Etiology Venous 11/02/22 1418   Dressing Status Clean;Dry; Intact 11/02/22 1418   Cleansed Soap and water 11/02/22 1418   Wound Length (cm) 1.8 cm 11/02/22 1418   Wound Width (cm) 0.6 cm 11/02/22 1418   Wound Depth (cm) 0.1 cm 11/02/22 1418   Wound Surface Area (cm^2) 1.08 cm^2 11/02/22 1418   Change in Wound Size % 99.02 11/02/22 1418   Wound Volume (cm^3) 0.108 cm^3 11/02/22 1418   Wound Healing % 99 11/02/22 1418   Post-Procedure Length (cm) 10.2 cm 07/20/22 1519   Post-Procedure Width (cm) 5 cm 07/20/22 1519 Post-Procedure Depth (cm) 0.1 cm 07/20/22 1519   Post-Procedure Surface Area (cm^2) 51 cm^2 07/20/22 1519   Post-Procedure Volume (cm^3) 5.1 cm^3 07/20/22 1519   Wound Assessment Pink/red;Granulation tissue 11/02/22 1418   Drainage Amount Small 11/02/22 1418   Drainage Description Serosanguinous 11/02/22 1418   Wound Odor None 11/02/22 1418   Alissa-Wound/Incision Assessment Intact 11/02/22 1418   Edges Attached edges 11/02/22 1418   Wound Thickness Description Full thickness 11/02/22 1418   Number of days: 245        Plan:   Continue silvercel, dratwex, coflex lite to LLE    Treatment Note please see attached Discharge Instructions    Written patient dismissal instructions given to patient or POA.          Electronically signed by Jericho Villanueva MD on 11/2/2022 at 2:50 PM

## 2022-11-02 NOTE — WOUND CARE
Discharge Instructions  [address] [place] [address], [address] [place] [address]  Telephone: [phone]    NAME:  Gwen Lu OF BIRTH:  1955  MEDICAL RECORD NUMBER:  [id number]  DATE: 11/02/2022       Return Appointment:  [] Dressing supply provider:   [x] Home Healthcare: [address] [facility]  [x] Return Appointment: 1 Week(s)  [] Nurse Visit:       [] Discharge from [facility]  [] Ordered tests:    [] Referrals:                    [] Rx:        Wound Cleansing:   Do not scrub or use excessive force. Cleanse wound prior to applying a clean dressing with:  [x] Normal Saline   [x] Mild Soap & Water/Baby Shampoo    [] Keep Wound Dry in Shower  [] Other:      Topical Treatments:  Do not apply lotions, creams, or ointments to wound bed unless directed. [x] Apply moisturizing lotion to skin surrounding the wound prior to dressing change.  [] Apply antifungal ointment to skin surrounding the wound prior to dressing change.  [] Apply thin film of moisture barrier ointment to skin immediately around wound. [] Other:  Betadine to israel-wound     Dressings:           Wound Location: Left leg (Right Leg - healed)  [x] Apply Primary Dressing:       [] MediHoney Gel    [] MediHoney Alginate               [] Calcium Alginate      [x] Calcium Alginate with silver- Silvercel   [] Collagen                   [] Collagen with Silver                [] Santyl with Moistened saline gauze              [] Hydrofera Blue (cut to size and moistened)  [] Hydrofera Blue Ready (Cut to size)   [] NS wet to dry    [] Betadine wet to dry              [] Hydrogel                [] Mepitel     [] Bactroban/Mupirocin               [] Other:     [] Cover and Secure with:     [x] Gauze [x] Cristian [] Kerlix   [] Foam [] Super Absorbant [] ABD     [] ACE Wrap            [] Other:    Avoid contact of tape with skin.     [x] Change dressing: [] Daily    [] Every Other Day [] Once per week   [x] Twice per week   [] Three times per week [] Do Not Change Dressing   [] Other:      Compression:  Apply: [x] Multilayer Compression Wrap: [x]RightLeg [x]Left Leg                                 []Profore   [x] profore lite  (coflex lite)        []Unna     [x] Do not get leg(s) with wrap wet. If wraps become too tight call the center or completely remove the wrap. [x] Elevate leg(s) above the level of the heart when sitting. [x] Avoid prolonged standing in one place. Dietary:  [] Diet as tolerated: [x] Calorie Diabetic Diet: [x] No Added Salt:  [x] Increase Protein: [] Other:     Activity:  [x] Activity as tolerated:    [] Patient has no activity restrictions       [] Strict Bedrest:   [] Remain off Work:       [] May return to full duty work:                                     [] Return to work with restrictions:               [address] Information: Should you experience any significant changes in your wound(s) or have questions about your wound care, please contact [address] at [place] [phone] 8:00 am - 4:30. If you need help with your wound outside of these hours and cannot wait until we are again available, contact your PCP or go to the hospital emergency room. PLEASE NOTE: IF YOU ARE UNABLE TO OBTAIN WOUND SUPPLIES, CONTINUE TO USE THE SUPPLIES YOU HAVE AVAILABLE UNTIL YOU ARE ABLE TO REACH US. IT IS MOST IMPORTANT TO KEEP THE WOUND COVERED AT ALL TIMES.     [x] Dr. Jessie Steele   [] Dr. Jaguar Pham  [] Dr. Gemma Rodriguez

## 2022-11-02 NOTE — DISCHARGE INSTRUCTIONS
Discharge Instructions  [address] [place] [address], [address] [place] [address]  Telephone: [phone]    NAME:  Gregorio Galloway OF BIRTH:  1955  MEDICAL RECORD NUMBER:  [id number]  DATE: 11/02/2022       Return Appointment:  [] Dressing supply provider:   [x] Home Healthcare: [address] [facility]  [x] Return Appointment: 1 Week(s)  [] Nurse Visit:       [] Discharge from [facility]  [] Ordered tests:    [] Referrals:                    [] Rx:        Wound Cleansing:   Do not scrub or use excessive force. Cleanse wound prior to applying a clean dressing with:  [x] Normal Saline   [x] Mild Soap & Water/Baby Shampoo    [] Keep Wound Dry in Shower  [] Other:      Topical Treatments:  Do not apply lotions, creams, or ointments to wound bed unless directed. [x] Apply moisturizing lotion to skin surrounding the wound prior to dressing change.  [] Apply antifungal ointment to skin surrounding the wound prior to dressing change.  [] Apply thin film of moisture barrier ointment to skin immediately around wound. [] Other:  Betadine to israel-wound     Dressings:           Wound Location: Left leg (Right Leg - healed)  [x] Apply Primary Dressing:       [] MediHoney Gel    [] MediHoney Alginate               [] Calcium Alginate      [x] Calcium Alginate with silver- Silvercel   [] Collagen                   [] Collagen with Silver                [] Santyl with Moistened saline gauze              [] Hydrofera Blue (cut to size and moistened)  [] Hydrofera Blue Ready (Cut to size)   [] NS wet to dry    [] Betadine wet to dry              [] Hydrogel                [] Mepitel     [] Bactroban/Mupirocin               [] Other:     [] Cover and Secure with:     [x] Gauze [x] Cristian [] Kerlix   [] Foam [] Super Absorbant [] ABD     [] ACE Wrap            [] Other:    Avoid contact of tape with skin.     [x] Change dressing: [] Daily    [] Every Other Day [] Once per week   [x] Twice per week   [] Three times per week [] Do Not Change Dressing   [] Other:      Compression:  Apply: [x] Multilayer Compression Wrap: [x]RightLeg [x]Left Leg                                 []Profore   [x] profore lite  (coflex lite)        []Unna     [x] Do not get leg(s) with wrap wet. If wraps become too tight call the center or completely remove the wrap. [x] Elevate leg(s) above the level of the heart when sitting. [x] Avoid prolonged standing in one place. Dietary:  [] Diet as tolerated: [x] Calorie Diabetic Diet: [x] No Added Salt:  [x] Increase Protein: [] Other:     Activity:  [x] Activity as tolerated:    [] Patient has no activity restrictions       [] Strict Bedrest:   [] Remain off Work:       [] May return to full duty work:                                     [] Return to work with restrictions:               [address] Information: Should you experience any significant changes in your wound(s) or have questions about your wound care, please contact Thais Doyle [address] at [place] [address] 8:00 am - 4:30. If you need help with your wound outside of these hours and cannot wait until we are again available, contact your PCP or go to the hospital emergency room. PLEASE NOTE: IF YOU ARE UNABLE TO OBTAIN WOUND SUPPLIES, CONTINUE TO USE THE SUPPLIES YOU HAVE AVAILABLE UNTIL YOU ARE ABLE TO REACH US. IT IS MOST IMPORTANT TO KEEP THE WOUND COVERED AT ALL TIMES.     [x] Dr. Sabiha Viramontes   [] Dr. Lang Gonzalez  [] Dr. Morrison No

## 2022-11-16 ENCOUNTER — HOSPITAL ENCOUNTER (OUTPATIENT)
Dept: WOUND CARE | Age: 67
Discharge: HOME | End: 2022-11-16
Payer: MEDICARE

## 2022-11-16 VITALS
HEART RATE: 88 BPM | TEMPERATURE: 98.1 F | SYSTOLIC BLOOD PRESSURE: 143 MMHG | DIASTOLIC BLOOD PRESSURE: 81 MMHG | RESPIRATION RATE: 22 BRPM

## 2022-11-16 DIAGNOSIS — I87.312 IDIOPATHIC CHRONIC VENOUS HYPERTENSION OF LEFT LOWER EXTREMITY WITH ULCER (HCC): ICD-10-CM

## 2022-11-16 DIAGNOSIS — L97.522 ULCER OF LEFT FOOT, WITH FAT LAYER EXPOSED (HCC): ICD-10-CM

## 2022-11-16 DIAGNOSIS — L97.412 ULCER OF RIGHT HEEL, WITH FAT LAYER EXPOSED (HCC): ICD-10-CM

## 2022-11-16 DIAGNOSIS — L97.929 IDIOPATHIC CHRONIC VENOUS HYPERTENSION OF LEFT LOWER EXTREMITY WITH ULCER (HCC): ICD-10-CM

## 2022-11-16 DIAGNOSIS — L97.822 NON-PRESSURE CHRONIC ULCER OF OTHER PART OF LEFT LOWER LEG WITH FAT LAYER EXPOSED (HCC): ICD-10-CM

## 2022-11-16 DIAGNOSIS — L97.812 NON-PRESSURE CHRONIC ULCER OF OTHER PART OF RIGHT LOWER LEG WITH FAT LAYER EXPOSED (HCC): Primary | ICD-10-CM

## 2022-11-16 DIAGNOSIS — R60.0 LOCALIZED EDEMA: ICD-10-CM

## 2022-11-16 PROCEDURE — 29581 APPL MULTLAYER CMPRN SYS LEG: CPT

## 2022-11-16 RX ORDER — SILVER SULFADIAZINE 10 G/1000G
CREAM TOPICAL ONCE
Status: CANCELLED | OUTPATIENT
Start: 2022-11-16 | End: 2022-11-16

## 2022-11-16 RX ORDER — LIDOCAINE HYDROCHLORIDE 20 MG/ML
15 SOLUTION OROPHARYNGEAL ONCE
Status: CANCELLED | OUTPATIENT
Start: 2022-11-16 | End: 2022-11-16

## 2022-11-16 RX ORDER — MUPIROCIN 20 MG/G
OINTMENT TOPICAL ONCE
Status: CANCELLED | OUTPATIENT
Start: 2022-11-16 | End: 2022-11-16

## 2022-11-16 NOTE — WOUND CARE
Discharge Instructions  [address] [place] [address], [address] [place] [address]  Telephone: [phone]    NAME:  Arias Hopkins OF BIRTH:  1955  MEDICAL RECORD NUMBER:  [id number]  DATE: 11/16/2022       Return Appointment:  [] Dressing supply provider:   [x] Home Healthcare: [address] [facility]  [x] Return Appointment: 1 Week(s)  [] Nurse Visit:       [] Discharge from [facility]  [] Ordered tests:    [] Referrals:                    [] Rx:        Wound Cleansing:   Do not scrub or use excessive force. Cleanse wound prior to applying a clean dressing with:  [x] Normal Saline   [x] Mild Soap & Water/Baby Shampoo    [] Keep Wound Dry in Shower  [] Other:      Topical Treatments:  Do not apply lotions, creams, or ointments to wound bed unless directed. [x] Apply moisturizing lotion to skin surrounding the wound prior to dressing change.  [] Apply antifungal ointment to skin surrounding the wound prior to dressing change.  [] Apply thin film of moisture barrier ointment to skin immediately around wound. [] Other:  Betadine to israel-wound     Dressings:           Wound Location: Left leg (Right Leg - healed)  [x] Apply Primary Dressing:       [] MediHoney Gel    [] MediHoney Alginate               [] Calcium Alginate      [x] Calcium Alginate with silver- Silvercel   [] Collagen                   [] Collagen with Silver                [] Santyl with Moistened saline gauze              [] Hydrofera Blue (cut to size and moistened)  [] Hydrofera Blue Ready (Cut to size)   [] NS wet to dry    [] Betadine wet to dry              [] Hydrogel                [] Mepitel     [] Bactroban/Mupirocin               [] Other:     [] Cover and Secure with:     [x] Gauze [x] Cristian [] Kerlix   [] Foam [] Super Absorbant [] ABD     [] ACE Wrap            [] Other:    Avoid contact of tape with skin.     [x] Change dressing: [] Daily    [] Every Other Day [] Once per week   [x] Twice per week   [] Three times per week [] Do Not Change Dressing   [] Other:      Compression:  Apply: [x] Multilayer Compression Wrap: [x]Right Leg D Tubi [x]Left Leg                                 []Profore   [x] profore lite  (coflex lite)        []Unna     [x] Do not get leg(s) with wrap wet. If wraps become too tight call the center or completely remove the wrap. [x] Elevate leg(s) above the level of the heart when sitting. [x] Avoid prolonged standing in one place. Dietary:  [] Diet as tolerated: [x] Calorie Diabetic Diet: [x] No Added Salt:  [x] Increase Protein: [] Other:     Activity:  [x] Activity as tolerated:    [] Patient has no activity restrictions       [] Strict Bedrest:   [] Remain off Work:       [] May return to full duty work:                                     [] Return to work with restrictions:               [address] [facility] Information: Should you experience any significant changes in your wound(s) or have questions about your wound care, please contact Thais Doyle [address] at [place] [address] 8:00 am - 4:30. If you need help with your wound outside of these hours and cannot wait until we are again available, contact your PCP or go to the hospital emergency room. PLEASE NOTE: IF YOU ARE UNABLE TO OBTAIN WOUND SUPPLIES, CONTINUE TO USE THE SUPPLIES YOU HAVE AVAILABLE UNTIL YOU ARE ABLE TO REACH US. IT IS MOST IMPORTANT TO KEEP THE WOUND COVERED AT ALL TIMES.     [x] Dr. Madeline Ford   [] Dr. Amanda Rizo  [] Dr. Kenneth Ocampo

## 2022-11-16 NOTE — WOUND CARE
Multilayer Compression Wrap   (Not Unna) Below the Knee    NAME:  Yaa Police OF BIRTH:  1955  MEDICAL RECORD NUMBER:  [id number]  DATE:  11/16/2022    Removed old Multilayer wrap if indicated and wash leg with mild soap/water. Applied primary and secondary dressing as ordered. Applied multilayered dressing below the knee to left lower leg. Instructed patient/caregiver not to remove dressing and to keep it clean and dry. Instructed patient/caregiver on complications to report to provider, such as pain, numbness in toes, heavy drainage, and slippage of dressing. Instructed patient on purpose of compression dressing and on activity and exercise recommendations.     Response to treatment: Well tolerated by patient       Electronically signed by Peg Kelly RN on 11/16/2022 at 2:22 PM

## 2022-11-16 NOTE — DISCHARGE INSTRUCTIONS
Discharge Instructions  [address] [place] [address], [address] [place] [address]  Telephone: [phone]    NAME:  John Ng OF BIRTH:  1955  MEDICAL RECORD NUMBER:  [id number]  DATE: 11/16/2022       Return Appointment:  [] Dressing supply provider:   [x] Home Healthcare: [address] [facility]  [x] Return Appointment: 1 Week(s)  [] Nurse Visit:       [] Discharge from [facility]  [] Ordered tests:    [] Referrals:                    [] Rx:        Wound Cleansing:   Do not scrub or use excessive force. Cleanse wound prior to applying a clean dressing with:  [x] Normal Saline   [x] Mild Soap & Water/Baby Shampoo    [] Keep Wound Dry in Shower  [] Other:      Topical Treatments:  Do not apply lotions, creams, or ointments to wound bed unless directed. [x] Apply moisturizing lotion to skin surrounding the wound prior to dressing change.  [] Apply antifungal ointment to skin surrounding the wound prior to dressing change.  [] Apply thin film of moisture barrier ointment to skin immediately around wound. [] Other:  Betadine to israel-wound     Dressings:           Wound Location: Left leg (Right Leg - healed)  [x] Apply Primary Dressing:       [] MediHoney Gel    [] MediHoney Alginate               [] Calcium Alginate      [x] Calcium Alginate with silver- Silvercel   [] Collagen                   [] Collagen with Silver                [] Santyl with Moistened saline gauze              [] Hydrofera Blue (cut to size and moistened)  [] Hydrofera Blue Ready (Cut to size)   [] NS wet to dry    [] Betadine wet to dry              [] Hydrogel                [] Mepitel     [] Bactroban/Mupirocin               [] Other:     [] Cover and Secure with:     [x] Gauze [x] Cristian [] Kerlix   [] Foam [] Super Absorbant [] ABD     [] ACE Wrap            [] Other:    Avoid contact of tape with skin.     [x] Change dressing: [] Daily    [] Every Other Day [] Once per week   [x] Twice per week   [] Three times per week [] Do Not Change Dressing   [] Other:      Compression:  Apply: [x] Multilayer Compression Wrap: [x]Right Leg D Tubi [x]Left Leg                                 []Profore   [x] profore lite  (coflex lite)        []Unna     [x] Do not get leg(s) with wrap wet. If wraps become too tight call the center or completely remove the wrap. [x] Elevate leg(s) above the level of the heart when sitting. [x] Avoid prolonged standing in one place. Dietary:  [] Diet as tolerated: [x] Calorie Diabetic Diet: [x] No Added Salt:  [x] Increase Protein: [] Other:     Activity:  [x] Activity as tolerated:    [] Patient has no activity restrictions       [] Strict Bedrest:   [] Remain off Work:       [] May return to full duty work:                                     [] Return to work with restrictions:               [address] [facility] Information: Should you experience any significant changes in your wound(s) or have questions about your wound care, please contact Thais Doyle [address] at [place] [address] 8:00 am - 4:30. If you need help with your wound outside of these hours and cannot wait until we are again available, contact your PCP or go to the hospital emergency room. PLEASE NOTE: IF YOU ARE UNABLE TO OBTAIN WOUND SUPPLIES, CONTINUE TO USE THE SUPPLIES YOU HAVE AVAILABLE UNTIL YOU ARE ABLE TO REACH US. IT IS MOST IMPORTANT TO KEEP THE WOUND COVERED AT ALL TIMES.     [x] Dr. Otis Zimmer   [] Dr. Ivan Orellana  [] Dr. Blayne Cash

## 2022-11-16 NOTE — WOUND CARE
[address]   Progress Note and Procedure Note   NO Procedure      [address] RECORD NUMBER:  [id number]  AGE: 75 y.o. RACE WHITE/NON-  GENDER: male  : 1955  EPISODE DATE:  2022    Subjective:   No new problems  Chief Complaint   Patient presents with    Wound Check     Left leg         HISTORY of PRESENT ILLNESS HPI    Betina Vera is a 75 y.o. male who presents today for wound/ulcer evaluation.    History of Wound Context: LLE  Wound/Ulcer Pain Timing/Severity: none  Quality of pain: dull  Severity:  0 / 10   Modifying Factors: None  Associated Signs/Symptoms: edema    Ulcer Identification:  Ulcer Type: venous    Contributing Factors: lymphedema    Wound: LLE         PAST MEDICAL HISTORY    Past Medical History:   Diagnosis Date    Arthritis     CAD (coronary artery disease)     Chronic obstructive pulmonary disease (HCC)     Diabetes (HCC)     Gout     Hypertension     Ill-defined condition     Sleep apnea         PAST SURGICAL HISTORY    Past Surgical History:   Procedure Laterality Date    HX ORTHOPAEDIC      HX PACEMAKER      HX VEIN ABLATION ADHESIVE         FAMILY HISTORY    Family History   Problem Relation Age of Onset    Heart Disease Mother     Kidney Disease Mother     Hypertension Mother     Diabetes Mother     Heart Disease Father     Hypertension Father     Diabetes Sister     Diabetes Brother        SOCIAL HISTORY    Social History     Tobacco Use    Smoking status: Never    Smokeless tobacco: Never   Vaping Use    Vaping Use: Never used   Substance Use Topics    Alcohol use: Not Currently    Drug use: Never       ALLERGIES    Allergies   Allergen Reactions    Elavil Angioedema    Naproxen Unknown (comments)     Pt not sure of reaction    Sulfa (Sulfonamide Antibiotics) Nausea and Vomiting       MEDICATIONS    Current Outpatient Medications on File Prior to Encounter   Medication Sig Dispense Refill    oxyCODONE IR (ROXICODONE) 10 mg tab immediate release tablet Take 10 mg by mouth every eight (8) hours as needed for Pain. insulin glargine (LANTUS) 100 unit/mL injection 27-unit subcu at bedtime 1 mL 2    insulin glargine,hum.rec.anlog (TOUJEO MAX U-300 SOLOSTAR SC) 80 Units by SubCUTAneous route two (2) times a day. 80 units in am, 34 units in pm      b complex vitamins tablet Take 1 Tablet by mouth daily. ammonium lactate (AMLACTIN) 12 % topical cream Apply  to affected area as needed for Other (xerosis). rub in to affected area well  Indications: dry skin 280 g 0    vitamin E, dl,tocopheryl acet, (vitamin E acetate) 400 unit capsule Take 2 Capsules by mouth two (2) times a day. 100 Capsule 0    ascorbic acid, vitamin C, (VITAMIN C) 500 mg tablet Take 1,000 mg by mouth two (2) times a day. Indications: inadequate vitamin C      tamsulosin (FLOMAX) 0.4 mg capsule Take 0.8 mg by mouth daily. tiotropium (SPIRIVA) 18 mcg inhalation capsule Take 1 Capsule by inhalation daily. bumetanide (BUMEX) 2 mg tablet Take 2 mg by mouth two (2) times a day. ferrous sulfate (Iron) 325 mg (65 mg iron) tablet Take  by mouth two (2) times daily (after meals). aspirin 81 mg chewable tablet Take 81 mg by mouth daily. flunisolide (NASAREL) 25 mcg (0.025 %) spry 2 Sprays two (2) times a day. albuterol (PROVENTIL HFA, VENTOLIN HFA, PROAIR HFA) 90 mcg/actuation inhaler Take 1 Puff by inhalation every four (4) hours as needed for Wheezing or Shortness of Breath. colchicine 0.6 mg tablet Take 0.6 mg by mouth daily. gabapentin (NEURONTIN) 300 mg capsule Take 300 mg by mouth four (4) times daily. insulin lispro (HUMALOG) 100 unit/mL injection by SubCUTAneous route. Sliding scale       No current facility-administered medications on file prior to encounter. REVIEW OF SYSTEMS    Pertinent items are noted in HPI. Objective:     Visit Vitals  BP (!) 143/81 (BP 1 Location: Right arm, BP Patient Position:  At rest;Sitting)   Pulse 88   Temp 98.1 °F (36.7 °C)   Resp 22       Wt Readings from Last 3 Encounters:   07/25/22 157.4 kg (347 lb)   07/21/22 157.4 kg (347 lb)   07/11/22 157.4 kg (347 lb)       PHYSICAL EXAM  Leg heel, left leg wound very small, minimal drainage, no evidence of infection  Pertinent items are noted in HPI. Assessment:    Almost healed  Problem List Items Addressed This Visit       Idiopathic chronic venous hypertension of left lower extremity with ulcer ([address]    Relevant Orders    INITIATE OUTPATIENT WOUND CARE PROTOCOL    Localized edema    Relevant Orders    INITIATE OUTPATIENT WOUND CARE PROTOCOL    Non-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 ([address]    Relevant Orders    INITIATE OUTPATIENT WOUND CARE PROTOCOL    Ulcer of left foot, with fat layer exposed ([address]    Relevant Orders    INITIATE OUTPATIENT WOUND CARE PROTOCOL    Ulcer of right heel, with fat layer exposed ([address]    Relevant Orders    INITIATE OUTPATIENT WOUND CARE PROTOCOL    Non-pressure chronic ulcer of other part of right lower leg with fat layer exposed ([address] - Primary    Relevant Orders    INITIATE OUTPATIENT WOUND CARE PROTOCOL       Procedure Note  Indications:  Based on my examination of this patient's wound(s)/ulcer(s) today, debridement is not required to promote healing and evaluate the wound base. Wound Leg lower Left;Lateral #1 (Active)   Wound Image   11/16/22 1405   Wound Etiology Venous 11/16/22 1405   Dressing Status Clean;Dry; Intact 11/16/22 1405   Cleansed Cleansed with saline 11/16/22 1405   Wound Length (cm) 0.6 cm 11/16/22 1405   Wound Width (cm) 0.6 cm 11/16/22 1405   Wound Depth (cm) 0.1 cm 11/16/22 1405   Wound Surface Area (cm^2) 0.36 cm^2 11/16/22 1405   Change in Wound Size % 99.67 11/16/22 1405   Wound Volume (cm^3) 0.036 cm^3 11/16/22 1405   Wound Healing % 100 11/16/22 1405   Post-Procedure Length (cm) 10.2 cm 07/20/22 1519   Post-Procedure Width (cm) 5 cm 07/20/22 1519   Post-Procedure Depth (cm) 0.1 cm 07/20/22 1519   Post-Procedure Surface Area (cm^2) 51 cm^2 07/20/22 1519   Post-Procedure Volume (cm^3) 5.1 cm^3 07/20/22 1519   Wound Assessment Pink/red;Granulation tissue 11/16/22 1405   Drainage Amount Small 11/16/22 1405   Drainage Description Serosanguinous 11/16/22 1405   Wound Odor None 11/16/22 1405   Alissa-Wound/Incision Assessment Intact 11/16/22 1405   Edges Attached edges 11/16/22 1405   Wound Thickness Description Full thickness 11/16/22 1405   Number of days: 259        Plan:   Silvercel, coflex to L leg, double tubi  to R leg    Treatment Note please see attached Discharge Instructions    Written patient dismissal instructions given to patient or POA.          Electronically signed by Gisselle Singh MD on 11/16/2022 at 2:19 PM

## 2022-11-23 ENCOUNTER — HOSPITAL ENCOUNTER (OUTPATIENT)
Dept: WOUND CARE | Age: 67
Discharge: HOME | End: 2022-11-23
Payer: MEDICARE

## 2022-11-23 VITALS
TEMPERATURE: 97.1 F | RESPIRATION RATE: 22 BRPM | SYSTOLIC BLOOD PRESSURE: 143 MMHG | DIASTOLIC BLOOD PRESSURE: 68 MMHG | HEART RATE: 86 BPM

## 2022-11-23 DIAGNOSIS — L97.929 IDIOPATHIC CHRONIC VENOUS HYPERTENSION OF LEFT LOWER EXTREMITY WITH ULCER (HCC): ICD-10-CM

## 2022-11-23 DIAGNOSIS — L97.822 NON-PRESSURE CHRONIC ULCER OF OTHER PART OF LEFT LOWER LEG WITH FAT LAYER EXPOSED (HCC): ICD-10-CM

## 2022-11-23 DIAGNOSIS — I87.312 IDIOPATHIC CHRONIC VENOUS HYPERTENSION OF LEFT LOWER EXTREMITY WITH ULCER (HCC): ICD-10-CM

## 2022-11-23 DIAGNOSIS — L97.412 ULCER OF RIGHT HEEL, WITH FAT LAYER EXPOSED (HCC): ICD-10-CM

## 2022-11-23 DIAGNOSIS — R60.0 LOCALIZED EDEMA: ICD-10-CM

## 2022-11-23 DIAGNOSIS — L97.812 NON-PRESSURE CHRONIC ULCER OF OTHER PART OF RIGHT LOWER LEG WITH FAT LAYER EXPOSED (HCC): Primary | ICD-10-CM

## 2022-11-23 DIAGNOSIS — L97.522 ULCER OF LEFT FOOT, WITH FAT LAYER EXPOSED (HCC): ICD-10-CM

## 2022-11-23 PROCEDURE — 74011000250 HC RX REV CODE- 250: Performed by: SPECIALIST

## 2022-11-23 PROCEDURE — 29581 APPL MULTLAYER CMPRN SYS LEG: CPT

## 2022-11-23 RX ORDER — LIDOCAINE HYDROCHLORIDE 20 MG/ML
15 SOLUTION OROPHARYNGEAL ONCE
Status: COMPLETED | OUTPATIENT
Start: 2022-11-23 | End: 2022-11-23

## 2022-11-23 RX ORDER — LIDOCAINE HYDROCHLORIDE 20 MG/ML
15 SOLUTION OROPHARYNGEAL ONCE
OUTPATIENT
Start: 2022-11-23 | End: 2022-11-23

## 2022-11-23 RX ORDER — MUPIROCIN 20 MG/G
OINTMENT TOPICAL ONCE
OUTPATIENT
Start: 2022-11-23 | End: 2022-11-23

## 2022-11-23 RX ORDER — SILVER SULFADIAZINE 10 G/1000G
CREAM TOPICAL ONCE
OUTPATIENT
Start: 2022-11-23 | End: 2022-11-23

## 2022-11-23 RX ADMIN — LIDOCAINE HYDROCHLORIDE 15 ML: 20 SOLUTION ORAL; TOPICAL at 13:36

## 2022-11-23 NOTE — WOUND CARE
Multilayer Compression Wrap   (Not Unna) Below the Knee    NAME:  Stephanie Munson OF BIRTH:  1955  MEDICAL RECORD NUMBER:  [id number]  DATE:  11/23/2022    Removed old Multilayer wrap if indicated and wash leg with mild soap/water. Applied primary and secondary dressing as ordered. Applied multilayered dressing below the knee to left lower leg. Instructed patient/caregiver not to remove dressing and to keep it clean and dry. Instructed patient/caregiver on complications to report to provider, such as pain, numbness in toes, heavy drainage, and slippage of dressing. Instructed patient on purpose of compression dressing and on activity and exercise recommendations.     Response to treatment: Well tolerated by patient       Electronically signed by Sil Minor RN on 11/23/2022 at 1:59 PM

## 2022-11-23 NOTE — WOUND CARE
[address]   Progress Note and Procedure Note   NO Procedure      [address] RECORD NUMBER:  [id number]  AGE: 75 y.o. RACE WHITE/NON-  GENDER: male  : 1955  EPISODE DATE:  2022    Subjective:   No new problems reported  Chief Complaint   Patient presents with    Wound Check     L leg         HISTORY of PRESENT ILLNESS HPI    Melvina Mathur is a 75 y.o. male who presents today for wound/ulcer evaluation.    History of Wound Context: LLE  Wound/Ulcer Pain Timing/Severity: none  Quality of pain: dull  Severity:  0 / 10   Modifying Factors: None  Associated Signs/Symptoms: edema    Ulcer Identification:  Ulcer Type: venous    Contributing Factors: edema    Wound: LLE         PAST MEDICAL HISTORY    Past Medical History:   Diagnosis Date    Arthritis     CAD (coronary artery disease)     Chronic obstructive pulmonary disease (HCC)     Diabetes (HCC)     Gout     Hypertension     Ill-defined condition     Sleep apnea         PAST SURGICAL HISTORY    Past Surgical History:   Procedure Laterality Date    HX ORTHOPAEDIC      HX PACEMAKER      HX VEIN ABLATION ADHESIVE         FAMILY HISTORY    Family History   Problem Relation Age of Onset    Heart Disease Mother     Kidney Disease Mother     Hypertension Mother     Diabetes Mother     Heart Disease Father     Hypertension Father     Diabetes Sister     Diabetes Brother        SOCIAL HISTORY    Social History     Tobacco Use    Smoking status: Never    Smokeless tobacco: Never   Vaping Use    Vaping Use: Never used   Substance Use Topics    Alcohol use: Not Currently    Drug use: Never       ALLERGIES    Allergies   Allergen Reactions    Elavil Angioedema    Naproxen Unknown (comments)     Pt not sure of reaction    Sulfa (Sulfonamide Antibiotics) Nausea and Vomiting       MEDICATIONS    Current Outpatient Medications on File Prior to Encounter   Medication Sig Dispense Refill    oxyCODONE IR (ROXICODONE) 10 mg tab immediate release tablet Take 10 mg by mouth every eight (8) hours as needed for Pain. insulin glargine (LANTUS) 100 unit/mL injection 27-unit subcu at bedtime 1 mL 2    insulin glargine,hum.rec.anlog (TOUJEO MAX U-300 SOLOSTAR SC) 80 Units by SubCUTAneous route two (2) times a day. 80 units in am, 34 units in pm      b complex vitamins tablet Take 1 Tablet by mouth daily. ammonium lactate (AMLACTIN) 12 % topical cream Apply  to affected area as needed for Other (xerosis). rub in to affected area well  Indications: dry skin 280 g 0    vitamin E, dl,tocopheryl acet, (vitamin E acetate) 400 unit capsule Take 2 Capsules by mouth two (2) times a day. 100 Capsule 0    ascorbic acid, vitamin C, (VITAMIN C) 500 mg tablet Take 1,000 mg by mouth two (2) times a day. Indications: inadequate vitamin C      tamsulosin (FLOMAX) 0.4 mg capsule Take 0.8 mg by mouth daily. tiotropium (SPIRIVA) 18 mcg inhalation capsule Take 1 Capsule by inhalation daily. bumetanide (BUMEX) 2 mg tablet Take 2 mg by mouth two (2) times a day. ferrous sulfate (Iron) 325 mg (65 mg iron) tablet Take  by mouth two (2) times daily (after meals). aspirin 81 mg chewable tablet Take 81 mg by mouth daily. flunisolide (NASAREL) 25 mcg (0.025 %) spry 2 Sprays two (2) times a day. albuterol (PROVENTIL HFA, VENTOLIN HFA, PROAIR HFA) 90 mcg/actuation inhaler Take 1 Puff by inhalation every four (4) hours as needed for Wheezing or Shortness of Breath. colchicine 0.6 mg tablet Take 0.6 mg by mouth daily. gabapentin (NEURONTIN) 300 mg capsule Take 300 mg by mouth four (4) times daily. insulin lispro (HUMALOG) 100 unit/mL injection by SubCUTAneous route. Sliding scale       No current facility-administered medications on file prior to encounter. REVIEW OF SYSTEMS    Pertinent items are noted in HPI. Objective:     Visit Vitals  BP (!) 143/68 (BP 1 Location: Left lower arm, BP Patient Position:  At rest;Sitting)   Pulse 86   Temp 97.1 °F (36.2 °C)   Resp 22       Wt Readings from Last 3 Encounters:   07/25/22 157.4 kg (347 lb)   07/21/22 157.4 kg (347 lb)   07/11/22 157.4 kg (347 lb)       PHYSICAL EXAM  Left lateral leg wound remains very small and superficial, healthy granulation tissue, no evidence of infection  Pertinent items are noted in HPI.     Assessment:    Almost healed  Problem List Items Addressed This Visit       Idiopathic chronic venous hypertension of left lower extremity with ulcer (HCC)    Relevant Medications    lidocaine (XYLOCAINE) 2 % viscous solution 15 mL (Completed)    Other Relevant Orders    INITIATE OUTPATIENT WOUND CARE PROTOCOL    Localized edema    Relevant Medications    lidocaine (XYLOCAINE) 2 % viscous solution 15 mL (Completed)    Other Relevant Orders    INITIATE OUTPATIENT WOUND CARE PROTOCOL    Non-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 ([address]    Relevant Medications    lidocaine (XYLOCAINE) 2 % viscous solution 15 mL (Completed)    Other Relevant Orders    INITIATE OUTPATIENT WOUND CARE PROTOCOL    Ulcer of left foot, with fat layer exposed ([address]    Relevant Medications    lidocaine (XYLOCAINE) 2 % viscous solution 15 mL (Completed)    Other Relevant Orders    INITIATE OUTPATIENT WOUND CARE PROTOCOL    Ulcer of right heel, with fat layer exposed (HCC)    Relevant Medications    lidocaine (XYLOCAINE) 2 % viscous solution 15 mL (Completed)    Other Relevant Orders    INITIATE OUTPATIENT WOUND CARE PROTOCOL    Non-pressure chronic ulcer of other part of right lower leg with fat layer exposed (HCC) - Primary    Relevant Medications    lidocaine (XYLOCAINE) 2 % viscous solution 15 mL (Completed)    Other Relevant Orders    INITIATE OUTPATIENT WOUND CARE PROTOCOL       Procedure Note  Indications:  Based on my examination of this patient's wound(s)/ulcer(s) today, debridement is not required to promote healing and evaluate the wound base.    Wound Leg lower Left;Lateral #1 (Active)   Wound Image   11/23/22 1323   Wound Etiology Venous 11/23/22 1323   Dressing Status Clean;Dry; Intact 11/23/22 1323   Cleansed Cleansed with saline 11/23/22 1323   Wound Length (cm) 0.8 cm 11/23/22 1323   Wound Width (cm) 0.6 cm 11/23/22 1323   Wound Depth (cm) 0.1 cm 11/23/22 1323   Wound Surface Area (cm^2) 0.48 cm^2 11/23/22 1323   Change in Wound Size % 99.56 11/23/22 1323   Wound Volume (cm^3) 0.048 cm^3 11/23/22 1323   Wound Healing % 100 11/23/22 1323   Post-Procedure Length (cm) 10.2 cm 07/20/22 1519   Post-Procedure Width (cm) 5 cm 07/20/22 1519   Post-Procedure Depth (cm) 0.1 cm 07/20/22 1519   Post-Procedure Surface Area (cm^2) 51 cm^2 07/20/22 1519   Post-Procedure Volume (cm^3) 5.1 cm^3 07/20/22 1519   Wound Assessment Pink/red;Granulation tissue 11/23/22 1323   Drainage Amount Small 11/23/22 1323   Drainage Description Serosanguinous 11/23/22 1323   Wound Odor None 11/23/22 1323   Alissa-Wound/Incision Assessment Intact 11/23/22 1323   Edges Attached edges 11/23/22 1323   Wound Thickness Description Full thickness 11/23/22 1323   Number of days: 266        Plan:   Medihoney gel, coflex lite    Treatment Note please see attached Discharge Instructions    Written patient dismissal instructions given to patient or POA.          Electronically signed by Honey Jose MD on 11/23/2022 at 2:05 PM

## 2022-11-23 NOTE — DISCHARGE INSTRUCTIONS
Discharge Instructions  [facility], [facility]  Telephone: [phone]    NAME:  Isaura Sykes OF BIRTH:  1955  MEDICAL RECORD NUMBER:  [id number]  DATE: 11/23/2022       Return Appointment:  [] Dressing supply provider:   [x] Home Healthcare: [address] [facility]  [x] Return Appointment: 1 Week(s)  [] Nurse Visit:       [] Discharge from [facility]  [] Ordered tests:    [] Referrals:                    [] Rx:        Wound Cleansing:   Do not scrub or use excessive force. Cleanse wound prior to applying a clean dressing with:  [x] Normal Saline   [x] Mild Soap & Water/Baby Shampoo    [] Keep Wound Dry in Shower  [] Other:      Topical Treatments:  Do not apply lotions, creams, or ointments to wound bed unless directed. [x] Apply moisturizing lotion to skin surrounding the wound prior to dressing change.  [] Apply antifungal ointment to skin surrounding the wound prior to dressing change.  [] Apply thin film of moisture barrier ointment to skin immediately around wound. [] Other:  Betadine to israel-wound     Dressings:           Wound Location: Left leg (Right Leg - healed)  [x] Apply Primary Dressing:       [x] MediHoney Gel    [] MediHoney Alginate               [] Calcium Alginate      [] Calcium Alginate with silver- Silvercel   [] Collagen                   [] Collagen with Silver                [] Santyl with Moistened saline gauze              [] Hydrofera Blue (cut to size and moistened)  [] Hydrofera Blue Ready (Cut to size)   [] NS wet to dry    [] Betadine wet to dry              [] Hydrogel                [] Mepitel     [] Bactroban/Mupirocin               [] Other:     [] Cover and Secure with:     [x] Gauze [x] Cristian [] Kerlix   [] Foam [] Super Absorbant [] ABD     [] ACE Wrap            [] Other:    Avoid contact of tape with skin.     [x] Change dressing: [] Daily    [] Every Other Day [] Once per week   [x] Twice per week   [] Three times per week [] Do Not Change Dressing   [] Other:      Compression:  Apply: [x] Multilayer Compression Wrap: [x]Right Leg D Tubi [x]Left Leg                                 []Profore   [x] profore lite  (coflex lite)        []Unna     [x] Do not get leg(s) with wrap wet. If wraps become too tight call the center or completely remove the wrap. [x] Elevate leg(s) above the level of the heart when sitting. [x] Avoid prolonged standing in one place. Dietary:  [] Diet as tolerated: [x] Calorie Diabetic Diet: [x] No Added Salt:  [x] Increase Protein: [] Other:     Activity:  [x] Activity as tolerated:    [] Patient has no activity restrictions       [] Strict Bedrest:   [] Remain off Work:       [] May return to full duty work:                                     [] Return to work with restrictions:               [facility] Information: Should you experience any significant changes in your wound(s) or have questions about your wound care, please contact [address] at [place] [phone] 8:00 am - 4:30. If you need help with your wound outside of these hours and cannot wait until we are again available, contact your PCP or go to the hospital emergency room. PLEASE NOTE: IF YOU ARE UNABLE TO OBTAIN WOUND SUPPLIES, CONTINUE TO USE THE SUPPLIES YOU HAVE AVAILABLE UNTIL YOU ARE ABLE TO REACH US. IT IS MOST IMPORTANT TO KEEP THE WOUND COVERED AT ALL TIMES.     [x] Dr. Sabiha Viramontes   [] Dr. Lang Gonzalez  [] Dr. Morrison No

## 2022-11-23 NOTE — WOUND CARE
Discharge Instructions  [address] [place] [address], [address] [place] [address]  Telephone: [phone]    NAME:  Rossana Mcmillan OF BIRTH:  1955  MEDICAL RECORD NUMBER:  [id number]  DATE: 11/23/2022       Return Appointment:  [] Dressing supply provider:   [x] Home Healthcare: [address] [facility]  [x] Return Appointment: 1 Week(s)  [] Nurse Visit:       [] Discharge from [facility]  [] Ordered tests:    [] Referrals:                    [] Rx:        Wound Cleansing:   Do not scrub or use excessive force. Cleanse wound prior to applying a clean dressing with:  [x] Normal Saline   [x] Mild Soap & Water/Baby Shampoo    [] Keep Wound Dry in Shower  [] Other:      Topical Treatments:  Do not apply lotions, creams, or ointments to wound bed unless directed. [x] Apply moisturizing lotion to skin surrounding the wound prior to dressing change.  [] Apply antifungal ointment to skin surrounding the wound prior to dressing change.  [] Apply thin film of moisture barrier ointment to skin immediately around wound. [] Other:  Betadine to israel-wound     Dressings:           Wound Location: Left leg (Right Leg - healed)  [x] Apply Primary Dressing:       [x] MediHoney Gel    [] MediHoney Alginate               [] Calcium Alginate      [] Calcium Alginate with silver- Silvercel   [] Collagen                   [] Collagen with Silver                [] Santyl with Moistened saline gauze              [] Hydrofera Blue (cut to size and moistened)  [] Hydrofera Blue Ready (Cut to size)   [] NS wet to dry    [] Betadine wet to dry              [] Hydrogel                [] Mepitel     [] Bactroban/Mupirocin               [] Other:     [] Cover and Secure with:     [x] Gauze [x] Cristian [] Kerlix   [] Foam [] Super Absorbant [] ABD     [] ACE Wrap            [] Other:    Avoid contact of tape with skin.     [x] Change dressing: [] Daily    [] Every Other Day [] Once per week   [x] Twice per week   [] Three times per week [] Do Not Change Dressing   [] Other:      Compression:  Apply: [x] Multilayer Compression Wrap: [x]Right Leg D Tubi [x]Left Leg                                 []Profore   [x] profore lite  (coflex lite)        []Unna     [x] Do not get leg(s) with wrap wet. If wraps become too tight call the center or completely remove the wrap. [x] Elevate leg(s) above the level of the heart when sitting. [x] Avoid prolonged standing in one place. Dietary:  [] Diet as tolerated: [x] Calorie Diabetic Diet: [x] No Added Salt:  [x] Increase Protein: [] Other:     Activity:  [x] Activity as tolerated:    [] Patient has no activity restrictions       [] Strict Bedrest:   [] Remain off Work:       [] May return to full duty work:                                     [] Return to work with restrictions:               [address] [facility] Information: Should you experience any significant changes in your wound(s) or have questions about your wound care, please contact [address] at [place] [phone] 8:00 am - 4:30. If you need help with your wound outside of these hours and cannot wait until we are again available, contact your PCP or go to the hospital emergency room. PLEASE NOTE: IF YOU ARE UNABLE TO OBTAIN WOUND SUPPLIES, CONTINUE TO USE THE SUPPLIES YOU HAVE AVAILABLE UNTIL YOU ARE ABLE TO REACH US. IT IS MOST IMPORTANT TO KEEP THE WOUND COVERED AT ALL TIMES.     [x] Dr. Britany Noonan   [] Dr. Cosme Schwartz  [] Dr. Edward Everett

## 2022-12-07 ENCOUNTER — HOSPITAL ENCOUNTER (OUTPATIENT)
Dept: WOUND CARE | Age: 67
Discharge: HOME | End: 2022-12-07
Payer: MEDICARE

## 2022-12-07 VITALS
SYSTOLIC BLOOD PRESSURE: 134 MMHG | RESPIRATION RATE: 22 BRPM | TEMPERATURE: 98.5 F | DIASTOLIC BLOOD PRESSURE: 69 MMHG | HEART RATE: 81 BPM

## 2022-12-07 DIAGNOSIS — L97.522 ULCER OF LEFT FOOT, WITH FAT LAYER EXPOSED (HCC): ICD-10-CM

## 2022-12-07 DIAGNOSIS — L97.929 IDIOPATHIC CHRONIC VENOUS HYPERTENSION OF LEFT LOWER EXTREMITY WITH ULCER (HCC): ICD-10-CM

## 2022-12-07 DIAGNOSIS — R60.0 LOCALIZED EDEMA: ICD-10-CM

## 2022-12-07 DIAGNOSIS — L97.812 NON-PRESSURE CHRONIC ULCER OF OTHER PART OF RIGHT LOWER LEG WITH FAT LAYER EXPOSED (HCC): Primary | ICD-10-CM

## 2022-12-07 DIAGNOSIS — L97.822 NON-PRESSURE CHRONIC ULCER OF OTHER PART OF LEFT LOWER LEG WITH FAT LAYER EXPOSED (HCC): ICD-10-CM

## 2022-12-07 DIAGNOSIS — I87.312 IDIOPATHIC CHRONIC VENOUS HYPERTENSION OF LEFT LOWER EXTREMITY WITH ULCER (HCC): ICD-10-CM

## 2022-12-07 DIAGNOSIS — L97.412 ULCER OF RIGHT HEEL, WITH FAT LAYER EXPOSED (HCC): ICD-10-CM

## 2022-12-07 PROCEDURE — 29581 APPL MULTLAYER CMPRN SYS LEG: CPT

## 2022-12-07 PROCEDURE — 74011000250 HC RX REV CODE- 250: Performed by: SPECIALIST

## 2022-12-07 RX ORDER — SILVER SULFADIAZINE 10 G/1000G
CREAM TOPICAL ONCE
OUTPATIENT
Start: 2022-12-07 | End: 2022-12-07

## 2022-12-07 RX ORDER — MUPIROCIN 20 MG/G
OINTMENT TOPICAL ONCE
OUTPATIENT
Start: 2022-12-07 | End: 2022-12-07

## 2022-12-07 RX ORDER — LIDOCAINE HYDROCHLORIDE 20 MG/ML
15 SOLUTION OROPHARYNGEAL ONCE
Status: DISCONTINUED | OUTPATIENT
Start: 2022-12-07 | End: 2022-12-08 | Stop reason: HOSPADM

## 2022-12-07 RX ORDER — LIDOCAINE HYDROCHLORIDE 20 MG/ML
15 SOLUTION OROPHARYNGEAL ONCE
OUTPATIENT
Start: 2022-12-07 | End: 2022-12-07

## 2022-12-07 NOTE — WOUND CARE
[address]   Progress Note and Procedure Note   NO Procedure      [address] RECORD NUMBER:  [id number]  AGE: 75 y.o. RACE WHITE/NON-  GENDER: male  : 1955  EPISODE DATE:  2022    Subjective:   No new problems reported  Chief Complaint   Patient presents with    Wound Check     L leg         HISTORY of PRESENT ILLNESS HPI    Pepper Urias is a 75 y.o. male who presents today for wound/ulcer evaluation.    History of Wound Context: LLE  Wound/Ulcer Pain Timing/Severity: none  Quality of pain: dull  Severity:  0 / 10   Modifying Factors: None  Associated Signs/Symptoms: edema    Ulcer Identification:  Ulcer Type: venous    Contributing Factors: edema    Wound: LLE         PAST MEDICAL HISTORY    Past Medical History:   Diagnosis Date    Arthritis     CAD (coronary artery disease)     Chronic obstructive pulmonary disease (HCC)     Diabetes (HCC)     Gout     Hypertension     Ill-defined condition     Sleep apnea         PAST SURGICAL HISTORY    Past Surgical History:   Procedure Laterality Date    HX ORTHOPAEDIC      HX PACEMAKER      HX VEIN ABLATION ADHESIVE         FAMILY HISTORY    Family History   Problem Relation Age of Onset    Heart Disease Mother     Kidney Disease Mother     Hypertension Mother     Diabetes Mother     Heart Disease Father     Hypertension Father     Diabetes Sister     Diabetes Brother        SOCIAL HISTORY    Social History     Tobacco Use    Smoking status: Never    Smokeless tobacco: Never   Vaping Use    Vaping Use: Never used   Substance Use Topics    Alcohol use: Not Currently    Drug use: Never       ALLERGIES    Allergies   Allergen Reactions    Elavil Angioedema    Naproxen Unknown (comments)     Pt not sure of reaction    Sulfa (Sulfonamide Antibiotics) Nausea and Vomiting       MEDICATIONS    Current Outpatient Medications on File Prior to Encounter   Medication Sig Dispense Refill    oxyCODONE IR (ROXICODONE) 10 mg tab immediate release tablet Take 10 mg by mouth every eight (8) hours as needed for Pain. insulin glargine (LANTUS) 100 unit/mL injection 27-unit subcu at bedtime 1 mL 2    insulin glargine,hum.rec.anlog (TOUJEO MAX U-300 SOLOSTAR SC) 80 Units by SubCUTAneous route two (2) times a day. 80 units in am, 34 units in pm      b complex vitamins tablet Take 1 Tablet by mouth daily. ammonium lactate (AMLACTIN) 12 % topical cream Apply  to affected area as needed for Other (xerosis). rub in to affected area well  Indications: dry skin 280 g 0    vitamin E, dl,tocopheryl acet, (vitamin E acetate) 400 unit capsule Take 2 Capsules by mouth two (2) times a day. 100 Capsule 0    ascorbic acid, vitamin C, (VITAMIN C) 500 mg tablet Take 1,000 mg by mouth two (2) times a day. Indications: inadequate vitamin C      tamsulosin (FLOMAX) 0.4 mg capsule Take 0.8 mg by mouth daily. tiotropium (SPIRIVA) 18 mcg inhalation capsule Take 1 Capsule by inhalation daily. bumetanide (BUMEX) 2 mg tablet Take 2 mg by mouth two (2) times a day. ferrous sulfate (Iron) 325 mg (65 mg iron) tablet Take  by mouth two (2) times daily (after meals). aspirin 81 mg chewable tablet Take 81 mg by mouth daily. flunisolide (NASAREL) 25 mcg (0.025 %) spry 2 Sprays two (2) times a day. albuterol (PROVENTIL HFA, VENTOLIN HFA, PROAIR HFA) 90 mcg/actuation inhaler Take 1 Puff by inhalation every four (4) hours as needed for Wheezing or Shortness of Breath. colchicine 0.6 mg tablet Take 0.6 mg by mouth daily. gabapentin (NEURONTIN) 300 mg capsule Take 300 mg by mouth four (4) times daily. insulin lispro (HUMALOG) 100 unit/mL injection by SubCUTAneous route. Sliding scale       No current facility-administered medications on file prior to encounter. REVIEW OF SYSTEMS    Pertinent items are noted in HPI. Objective:     Visit Vitals  /69 (BP 1 Location: Right lower arm, BP Patient Position:  At rest;Sitting)   Pulse 81   Temp 98.5 °F (36.9 °C)   Resp 22       Wt Readings from Last 3 Encounters:   07/25/22 157.4 kg (347 lb)   07/21/22 157.4 kg (347 lb)   07/11/22 157.4 kg (347 lb)       PHYSICAL EXAM  Left leg wound is very small, healthy appearing, no evidence of infection  Pertinent items are noted in HPI. 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 (HCC)    Relevant Medications    lidocaine (XYLOCAINE) 2 % viscous solution 15 mL    Other Relevant Orders    INITIATE OUTPATIENT WOUND CARE PROTOCOL    Ulcer of left foot, with fat layer exposed (HCC)    Relevant Medications    lidocaine (XYLOCAINE) 2 % viscous solution 15 mL    Other Relevant Orders    INITIATE OUTPATIENT WOUND CARE PROTOCOL    Ulcer of right heel, with fat layer exposed (HCC)    Relevant Medications    lidocaine (XYLOCAINE) 2 % viscous solution 15 mL    Other Relevant Orders    INITIATE OUTPATIENT WOUND CARE PROTOCOL    Non-pressure chronic ulcer of other part of right lower leg with fat layer exposed (HCC) - Primary    Relevant Medications    lidocaine (XYLOCAINE) 2 % viscous solution 15 mL    Other Relevant Orders    INITIATE OUTPATIENT WOUND CARE PROTOCOL       Procedure Note  Indications:  Based on my examination of this patient's wound(s)/ulcer(s) today, debridement is not required to promote healing and evaluate the wound base.     Wound Leg lower Left;Lateral #1 (Active)   Wound Image   12/07/22 1353   Wound Etiology Venous 12/07/22 1350 Dressing Status Clean;Dry; Intact 12/07/22 1353   Cleansed Soap and water 12/07/22 1353   Wound Length (cm) 0.4 cm 12/07/22 1353   Wound Width (cm) 0.4 cm 12/07/22 1353   Wound Depth (cm) 0.1 cm 12/07/22 1353   Wound Surface Area (cm^2) 0.16 cm^2 12/07/22 1353   Change in Wound Size % 99.85 12/07/22 1353   Wound Volume (cm^3) 0.016 cm^3 12/07/22 1353   Wound Healing % 100 12/07/22 1353   Post-Procedure Length (cm) 10.2 cm 07/20/22 1519   Post-Procedure Width (cm) 5 cm 07/20/22 1519   Post-Procedure Depth (cm) 0.1 cm 07/20/22 1519   Post-Procedure Surface Area (cm^2) 51 cm^2 07/20/22 1519   Post-Procedure Volume (cm^3) 5.1 cm^3 07/20/22 1519   Wound Assessment Pink/red;Granulation tissue 12/07/22 1353   Drainage Amount Small 12/07/22 1353   Drainage Description Serosanguinous 12/07/22 1353   Wound Odor None 12/07/22 1353   Alissa-Wound/Incision Assessment Intact 12/07/22 1353   Edges Attached edges 12/07/22 1353   Wound Thickness Description Full thickness 11/23/22 1323   Number of days: 280        Plan:   Medihoney gel  CoFlex lite  Leg elevation    Treatment Note please see attached Discharge Instructions    Written patient dismissal instructions given to patient or POA.          Electronically signed by Pastor Terrance MD on 12/7/2022 at 2:46 PM

## 2022-12-07 NOTE — DISCHARGE INSTRUCTIONS
Discharge Instructions  [address] [place] [address], [address] [place] [address]  Telephone: [phone]    NAME:  Arias Hopkins OF BIRTH:  1955  MEDICAL RECORD NUMBER:  [id number]  DATE: 12/07/2022       Return Appointment:  [] Dressing supply provider:   [x] Home Healthcare: [address] [facility]  please order tubi    [x] Return Appointment: 1 Week(s)  [] Nurse Visit:       [] Discharge from [facility]  [] Ordered tests:    [] Referrals:                    [] Rx:        Wound Cleansing:   Do not scrub or use excessive force. Cleanse wound prior to applying a clean dressing with:  [x] Normal Saline   [x] Mild Soap & Water/Baby Shampoo    [] Keep Wound Dry in Shower  [] Other:      Topical Treatments:  Do not apply lotions, creams, or ointments to wound bed unless directed. [x] Apply moisturizing lotion to skin surrounding the wound prior to dressing change.  [] Apply antifungal ointment to skin surrounding the wound prior to dressing change.  [] Apply thin film of moisture barrier ointment to skin immediately around wound. [] Other:  Betadine to israel-wound     Dressings:           Wound Location: Left leg (Right Leg - healed)  [x] Apply Primary Dressing:       [x] MediHoney Gel    [] MediHoney Alginate               [] Calcium Alginate      [] Calcium Alginate with silver- Silvercel   [] Collagen                   [] Collagen with Silver                [] Santyl with Moistened saline gauze              [] Hydrofera Blue (cut to size and moistened)  [] Hydrofera Blue Ready (Cut to size)   [] NS wet to dry    [] Betadine wet to dry              [] Hydrogel                [] Mepitel     [] Bactroban/Mupirocin               [] Other:     [] Cover and Secure with:     [x] Gauze [x] Cristian [] Kerlix   [] Foam [] Super Absorbant [] ABD     [] ACE Wrap            [] Other:    Avoid contact of tape with skin.     [x] Change dressing: [] Daily    [] Every Other Day [] Once per week   [x] Twice per week   [] Three times per week [] Do Not Change Dressing   [] Other:      Compression:  Apply: [x] Multilayer Compression Wrap: [x]Right Leg D Tubi []                                 []Profore   [x] profore lite  (coflex lite) Left       []Unna     [x] Do not get leg(s) with wrap wet. If wraps become too tight call the center or completely remove the wrap. [x] Elevate leg(s) above the level of the heart when sitting. [x] Avoid prolonged standing in one place. Dietary:  [] Diet as tolerated: [x] Calorie Diabetic Diet: [x] No Added Salt:  [x] Increase Protein: [] Other:     Activity:  [x] Activity as tolerated:    [] Patient has no activity restrictions       [] Strict Bedrest:   [] Remain off Work:       [] May return to full duty work:                                     [] Return to work with restrictions:               [address] Information: Should you experience any significant changes in your wound(s) or have questions about your wound care, please contact Thais Doyle [address] at [place] [address] 8:00 am - 4:30. If you need help with your wound outside of these hours and cannot wait until we are again available, contact your PCP or go to the hospital emergency room. PLEASE NOTE: IF YOU ARE UNABLE TO OBTAIN WOUND SUPPLIES, CONTINUE TO USE THE SUPPLIES YOU HAVE AVAILABLE UNTIL YOU ARE ABLE TO REACH US. IT IS MOST IMPORTANT TO KEEP THE WOUND COVERED AT ALL TIMES.     [x] Dr. Dinora Casper   [] Dr. Katie Hagen  [] Dr. Melba Darnell

## 2022-12-07 NOTE — WOUND CARE
Discharge Instructions  [address] [place] [address], [address] [place] [address]  Telephone: [phone]    NAME:  Yovany Childress OF BIRTH:  1955  MEDICAL RECORD NUMBER:  [id number]  DATE: 12/07/2022       Return Appointment:  [] Dressing supply provider:   [x] [facility] [address] [facility]  [x] Return Appointment: 1 Week(s)  [] Nurse Visit:       [] Discharge from [facility]  [] Ordered tests:    [] Referrals:                    [] Rx:        Wound Cleansing:   Do not scrub or use excessive force. Cleanse wound prior to applying a clean dressing with:  [x] Normal Saline   [x] Mild Soap & Water/Baby Shampoo    [] Keep Wound Dry in Shower  [] Other:      Topical Treatments:  Do not apply lotions, creams, or ointments to wound bed unless directed. [x] Apply moisturizing lotion to skin surrounding the wound prior to dressing change.  [] Apply antifungal ointment to skin surrounding the wound prior to dressing change.  [] Apply thin film of moisture barrier ointment to skin immediately around wound. [] Other:  Betadine to israel-wound     Dressings:           Wound Location: Left leg (Right Leg - healed)  [x] Apply Primary Dressing:       [x] MediHoney Gel    [] MediHoney Alginate               [] Calcium Alginate      [] Calcium Alginate with silver- Silvercel   [] Collagen                   [] Collagen with Silver                [] Santyl with Moistened saline gauze              [] Hydrofera Blue (cut to size and moistened)  [] Hydrofera Blue Ready (Cut to size)   [] NS wet to dry    [] Betadine wet to dry              [] Hydrogel                [] Mepitel     [] Bactroban/Mupirocin               [] Other:     [] Cover and Secure with:     [x] Gauze [x] Cristian [] Kerlix   [] Foam [] Super Absorbant [] ABD     [] ACE Wrap            [] Other:    Avoid contact of tape with skin.     [x] Change dressing: [] Daily    [] Every Other Day [] Once per week   [x] Twice per week   [] Three times per week [] Do Not Change Dressing   [] Other:      Compression:  Apply: [x] Multilayer Compression Wrap: [x]Right Leg D Tubi [x]Left Leg                                 []Profore   [x] profore lite  (coflex lite) Left       []Unna     [x] Do not get leg(s) with wrap wet. If wraps become too tight call the center or completely remove the wrap. [x] Elevate leg(s) above the level of the heart when sitting. [x] Avoid prolonged standing in one place. Dietary:  [] Diet as tolerated: [x] Calorie Diabetic Diet: [x] No Added Salt:  [x] Increase Protein: [] Other:     Activity:  [x] Activity as tolerated:    [] Patient has no activity restrictions       [] Strict Bedrest:   [] Remain off Work:       [] May return to full duty work:                                     [] Return to work with restrictions:               [address] [facility] Information: Should you experience any significant changes in your wound(s) or have questions about your wound care, please contact Thais Doyle [address] at [place] [address] 8:00 am - 4:30. If you need help with your wound outside of these hours and cannot wait until we are again available, contact your PCP or go to the hospital emergency room. PLEASE NOTE: IF YOU ARE UNABLE TO OBTAIN WOUND SUPPLIES, CONTINUE TO USE THE SUPPLIES YOU HAVE AVAILABLE UNTIL YOU ARE ABLE TO REACH US. IT IS MOST IMPORTANT TO KEEP THE WOUND COVERED AT ALL TIMES.     [x] Dr. Felisa Norwood   [] Dr. Patricia Booker  [] Dr. Anderson Mcclure

## 2022-12-07 NOTE — WOUND CARE
Discharge Instructions  [address] [place] [address], [facility]  Telephone: [phone]    NAME:  Solis Presley OF BIRTH:  1955  MEDICAL RECORD NUMBER:  [id number]  DATE: 12/07/2022       Return Appointment:  [] Dressing supply provider:   [x] Home Healthcare: [address] [facility]  please order tubi    [x] Return Appointment: 1 Week(s)  [] Nurse Visit:       [] Discharge from [facility]  [] Ordered tests:    [] Referrals:                    [] Rx:        Wound Cleansing:   Do not scrub or use excessive force. Cleanse wound prior to applying a clean dressing with:  [x] Normal Saline   [x] Mild Soap & Water/Baby Shampoo    [] Keep Wound Dry in Shower  [] Other:      Topical Treatments:  Do not apply lotions, creams, or ointments to wound bed unless directed. [x] Apply moisturizing lotion to skin surrounding the wound prior to dressing change.  [] Apply antifungal ointment to skin surrounding the wound prior to dressing change.  [] Apply thin film of moisture barrier ointment to skin immediately around wound. [] Other:  Betadine to israel-wound     Dressings:           Wound Location: Left leg (Right Leg - healed)  [x] Apply Primary Dressing:       [x] MediHoney Gel    [] MediHoney Alginate               [] Calcium Alginate      [] Calcium Alginate with silver- Silvercel   [] Collagen                   [] Collagen with Silver                [] Santyl with Moistened saline gauze              [] Hydrofera Blue (cut to size and moistened)  [] Hydrofera Blue Ready (Cut to size)   [] NS wet to dry    [] Betadine wet to dry              [] Hydrogel                [] Mepitel     [] Bactroban/Mupirocin               [] Other:     [] Cover and Secure with:     [x] Gauze [x] Cristian [] Kerlix   [] Foam [] Super Absorbant [] ABD     [] ACE Wrap            [] Other:    Avoid contact of tape with skin.     [x] Change dressing: [] Daily    [] Every Other Day [] Once per week   [x] Twice per week   [] Three times per week [] Do Not Change Dressing   [] Other:      Compression:  Apply: [x] Multilayer Compression Wrap: [x]Right Leg D Tubi []                                 []Profore   [x] profore lite  (coflex lite) Left       []Unna     [x] Do not get leg(s) with wrap wet. If wraps become too tight call the center or completely remove the wrap. [x] Elevate leg(s) above the level of the heart when sitting. [x] Avoid prolonged standing in one place. Dietary:  [] Diet as tolerated: [x] Calorie Diabetic Diet: [x] No Added Salt:  [x] Increase Protein: [] Other:     Activity:  [x] Activity as tolerated:    [] Patient has no activity restrictions       [] Strict Bedrest:   [] Remain off Work:       [] May return to full duty work:                                     [] Return to work with restrictions:               [address] Information: Should you experience any significant changes in your wound(s) or have questions about your wound care, please contact Thais Doyle [address] at [place] [address] 8:00 am - 4:30. If you need help with your wound outside of these hours and cannot wait until we are again available, contact your PCP or go to the hospital emergency room. PLEASE NOTE: IF YOU ARE UNABLE TO OBTAIN WOUND SUPPLIES, CONTINUE TO USE THE SUPPLIES YOU HAVE AVAILABLE UNTIL YOU ARE ABLE TO REACH US. IT IS MOST IMPORTANT TO KEEP THE WOUND COVERED AT ALL TIMES.     [x] Dr. Magy Samuel   [] Dr. García Richards  [] Dr. Jazz Heredia

## 2022-12-14 ENCOUNTER — HOSPITAL ENCOUNTER (OUTPATIENT)
Dept: WOUND CARE | Age: 67
Discharge: HOME | End: 2022-12-14
Payer: MEDICARE

## 2022-12-14 VITALS
RESPIRATION RATE: 22 BRPM | HEART RATE: 95 BPM | SYSTOLIC BLOOD PRESSURE: 158 MMHG | TEMPERATURE: 98.2 F | DIASTOLIC BLOOD PRESSURE: 80 MMHG

## 2022-12-14 DIAGNOSIS — L97.412 ULCER OF RIGHT HEEL, WITH FAT LAYER EXPOSED (HCC): ICD-10-CM

## 2022-12-14 DIAGNOSIS — L97.929 IDIOPATHIC CHRONIC VENOUS HYPERTENSION OF LEFT LOWER EXTREMITY WITH ULCER (HCC): ICD-10-CM

## 2022-12-14 DIAGNOSIS — L97.812 NON-PRESSURE CHRONIC ULCER OF OTHER PART OF RIGHT LOWER LEG WITH FAT LAYER EXPOSED (HCC): Primary | ICD-10-CM

## 2022-12-14 DIAGNOSIS — I87.312 IDIOPATHIC CHRONIC VENOUS HYPERTENSION OF LEFT LOWER EXTREMITY WITH ULCER (HCC): ICD-10-CM

## 2022-12-14 DIAGNOSIS — R60.0 LOCALIZED EDEMA: ICD-10-CM

## 2022-12-14 DIAGNOSIS — L97.822 NON-PRESSURE CHRONIC ULCER OF OTHER PART OF LEFT LOWER LEG WITH FAT LAYER EXPOSED (HCC): ICD-10-CM

## 2022-12-14 DIAGNOSIS — L97.522 ULCER OF LEFT FOOT, WITH FAT LAYER EXPOSED (HCC): ICD-10-CM

## 2022-12-14 PROCEDURE — 29581 APPL MULTLAYER CMPRN SYS LEG: CPT

## 2022-12-14 RX ORDER — SILVER SULFADIAZINE 10 G/1000G
CREAM TOPICAL ONCE
OUTPATIENT
Start: 2022-12-14 | End: 2022-12-14

## 2022-12-14 RX ORDER — LIDOCAINE HYDROCHLORIDE 20 MG/ML
15 SOLUTION OROPHARYNGEAL ONCE
OUTPATIENT
Start: 2022-12-14 | End: 2022-12-14

## 2022-12-14 RX ORDER — MUPIROCIN 20 MG/G
OINTMENT TOPICAL ONCE
OUTPATIENT
Start: 2022-12-14 | End: 2022-12-14

## 2022-12-14 NOTE — WOUND CARE
[address]   Progress Note and Procedure Note   NO Procedure      [address] RECORD NUMBER:  [id number]  AGE: 75 y.o. RACE WHITE/NON-  GENDER: male  : 1955  EPISODE DATE:  2022    Subjective:   No new problems  Chief Complaint   Patient presents with    Wound Check     L leg         HISTORY of PRESENT ILLNESS JAZZY Mcpherson is a 75 y.o. male who presents today for wound/ulcer evaluation.    History of Wound Context: LLE  Wound/Ulcer Pain Timing/Severity: none  Quality of pain: dull  Severity:  0 / 10   Modifying Factors: None  Associated Signs/Symptoms: edema    Ulcer Identification:  Ulcer Type: venous    Contributing Factors: lymphedema    Wound: LLE         PAST MEDICAL HISTORY    Past Medical History:   Diagnosis Date    Arthritis     CAD (coronary artery disease)     Chronic obstructive pulmonary disease (HCC)     Diabetes (HCC)     Gout     Hypertension     Ill-defined condition     Sleep apnea         PAST SURGICAL HISTORY    Past Surgical History:   Procedure Laterality Date    HX ORTHOPAEDIC      HX PACEMAKER      HX VEIN ABLATION ADHESIVE         FAMILY HISTORY    Family History   Problem Relation Age of Onset    Heart Disease Mother     Kidney Disease Mother     Hypertension Mother     Diabetes Mother     Heart Disease Father     Hypertension Father     Diabetes Sister     Diabetes Brother        SOCIAL HISTORY    Social History     Tobacco Use    Smoking status: Never    Smokeless tobacco: Never   Vaping Use    Vaping Use: Never used   Substance Use Topics    Alcohol use: Not Currently    Drug use: Never       ALLERGIES    Allergies   Allergen Reactions    Elavil Angioedema    Naproxen Unknown (comments)     Pt not sure of reaction    Sulfa (Sulfonamide Antibiotics) Nausea and Vomiting       MEDICATIONS    Current Outpatient Medications on File Prior to Encounter   Medication Sig Dispense Refill    oxyCODONE IR (ROXICODONE) 10 mg tab immediate release tablet Take 10 mg by mouth every eight (8) hours as needed for Pain. insulin glargine (LANTUS) 100 unit/mL injection 27-unit subcu at bedtime 1 mL 2    insulin glargine,hum.rec.anlog (TOUJEO MAX U-300 SOLOSTAR SC) 80 Units by SubCUTAneous route two (2) times a day. 80 units in am, 34 units in pm      b complex vitamins tablet Take 1 Tablet by mouth daily. ammonium lactate (AMLACTIN) 12 % topical cream Apply  to affected area as needed for Other (xerosis). rub in to affected area well  Indications: dry skin 280 g 0    vitamin E, dl,tocopheryl acet, (vitamin E acetate) 400 unit capsule Take 2 Capsules by mouth two (2) times a day. 100 Capsule 0    ascorbic acid, vitamin C, (VITAMIN C) 500 mg tablet Take 1,000 mg by mouth two (2) times a day. Indications: inadequate vitamin C      tamsulosin (FLOMAX) 0.4 mg capsule Take 0.8 mg by mouth daily. tiotropium (SPIRIVA) 18 mcg inhalation capsule Take 1 Capsule by inhalation daily. bumetanide (BUMEX) 2 mg tablet Take 2 mg by mouth two (2) times a day. ferrous sulfate (Iron) 325 mg (65 mg iron) tablet Take  by mouth two (2) times daily (after meals). aspirin 81 mg chewable tablet Take 81 mg by mouth daily. flunisolide (NASAREL) 25 mcg (0.025 %) spry 2 Sprays two (2) times a day. albuterol (PROVENTIL HFA, VENTOLIN HFA, PROAIR HFA) 90 mcg/actuation inhaler Take 1 Puff by inhalation every four (4) hours as needed for Wheezing or Shortness of Breath. colchicine 0.6 mg tablet Take 0.6 mg by mouth daily. gabapentin (NEURONTIN) 300 mg capsule Take 300 mg by mouth four (4) times daily. insulin lispro (HUMALOG) 100 unit/mL injection by SubCUTAneous route. Sliding scale       No current facility-administered medications on file prior to encounter. REVIEW OF SYSTEMS    Pertinent items are noted in HPI. Objective:     Visit Vitals  BP (!) 158/80 (BP 1 Location: Right lower arm, BP Patient Position:  At rest;Sitting)   Pulse 95   Temp 98.2 °F (36.8 °C)   Resp 22       Wt Readings from Last 3 Encounters:   07/25/22 157.4 kg (347 lb)   07/21/22 157.4 kg (347 lb)   07/11/22 157.4 kg (347 lb)       PHYSICAL EXAM  On the left calf, the small wound remains open, but is very tiny, no evidence of infection  Pertinent items are noted in HPI. Assessment:   Almost healed  Problem List Items Addressed This Visit       Idiopathic chronic venous hypertension of left lower extremity with ulcer ([address]    Relevant Orders    INITIATE OUTPATIENT WOUND CARE PROTOCOL    Localized edema    Relevant Orders    INITIATE OUTPATIENT WOUND CARE PROTOCOL    Non-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 ([address]    Relevant Orders    INITIATE OUTPATIENT WOUND CARE PROTOCOL    Ulcer of left foot, with fat layer exposed ([address]    Relevant Orders    INITIATE OUTPATIENT WOUND CARE PROTOCOL    Ulcer of right heel, with fat layer exposed ([address]    Relevant Orders    INITIATE OUTPATIENT WOUND CARE PROTOCOL    Non-pressure chronic ulcer of other part of right lower leg with fat layer exposed ([address] - Primary    Relevant Orders    INITIATE OUTPATIENT WOUND CARE PROTOCOL       Procedure Note  Indications:  Based on my examination of this patient's wound(s)/ulcer(s) today, debridement is not required to promote healing and evaluate the wound base. Wound Leg lower Left;Lateral #1 (Active)   Wound Image   12/14/22 1423   Wound Etiology Venous 12/14/22 1423   Dressing Status Clean;Dry; Intact 12/14/22 1423   Cleansed Soap and water 12/14/22 1423   Wound Length (cm) 0.7 cm 12/14/22 1423   Wound Width (cm) 0.2 cm 12/14/22 1423   Wound Depth (cm) 0.1 cm 12/14/22 1423   Wound Surface Area (cm^2) 0.14 cm^2 12/14/22 1423   Change in Wound Size % 99.87 12/14/22 1423   Wound Volume (cm^3) 0.014 cm^3 12/14/22 1423   Wound Healing % 100 12/14/22 1423   Post-Procedure Length (cm) 10.2 cm 07/20/22 1519   Post-Procedure Width (cm) 5 cm 07/20/22 1519   Post-Procedure Depth (cm) 0.1 cm 07/20/22 1519   Post-Procedure Surface Area (cm^2) 51 cm^2 07/20/22 1519   Post-Procedure Volume (cm^3) 5.1 cm^3 07/20/22 1519   Wound Assessment Pink/red;Granulation tissue 12/14/22 1423   Drainage Amount Small 12/14/22 1423   Drainage Description Serosanguinous 12/14/22 1423   Wound Odor None 12/14/22 1423   Alissa-Wound/Incision Assessment Intact 12/14/22 1423   Edges Attached edges 12/14/22 1423   Wound Thickness Description Full thickness 12/14/22 1423   Number of days: 287        Plan:   Continue Medihoney gel, Coflex light wrap  Treatment Note please see attached Discharge Instructions    Written patient dismissal instructions given to patient or POA.          Electronically signed by Farideh Cm MD on 12/14/2022 at 3:13 PM

## 2022-12-14 NOTE — DISCHARGE INSTRUCTIONS
Discharge Instructions  7 [facility], [facility]  Telephone: [phone]    NAME:  Ruth rEvin OF BIRTH:  1955  MEDICAL RECORD NUMBER:  [id number]  DATE: 12/14/2022       Return Appointment:  [] Dressing supply provider:   [x] Home Healthcare: [address] [facility]  please order tubi    [x] Return Appointment: 1 Week(s)  [] Nurse Visit:       [] Discharge from [facility]  [] Ordered tests:    [] Referrals:                    [] Rx:        Wound Cleansing:   Do not scrub or use excessive force. Cleanse wound prior to applying a clean dressing with:  [x] Normal Saline   [x] Mild Soap & Water/Baby Shampoo    [] Keep Wound Dry in Shower  [] Other:      Topical Treatments:  Do not apply lotions, creams, or ointments to wound bed unless directed. [x] Apply moisturizing lotion to skin surrounding the wound prior to dressing change.  [] Apply antifungal ointment to skin surrounding the wound prior to dressing change.  [] Apply thin film of moisture barrier ointment to skin immediately around wound. [] Other:  Betadine to israel-wound     Dressings:           Wound Location: Left leg (Right Leg - healed)  [x] Apply Primary Dressing:       [x] MediHoney Gel    [] MediHoney Alginate               [] Calcium Alginate      [] Calcium Alginate with silver- Silvercel   [] Collagen                   [] Collagen with Silver                [] Santyl with Moistened saline gauze              [] Hydrofera Blue (cut to size and moistened)  [] Hydrofera Blue Ready (Cut to size)   [] NS wet to dry    [] Betadine wet to dry              [] Hydrogel                [] Mepitel     [] Bactroban/Mupirocin               [] Other:     [] Cover and Secure with:     [x] Gauze [x] Cristian [] Kerlix   [] Foam [] Super Absorbant [] ABD     [] ACE Wrap            [] Other:    Avoid contact of tape with skin.     [x] Change dressing: [] Daily    [] Every Other Day [] Once per week   [x] Twice per week   [] Three times per week [] Do Not Change Dressing   [] Other:      Compression:  Apply: [x] Multilayer Compression Wrap: [x]Right Leg D Tubi []                                 []Profore   [x] profore lite  (coflex lite) Left       []Unna     [x] Do not get leg(s) with wrap wet. If wraps become too tight call the center or completely remove the wrap. [x] Elevate leg(s) above the level of the heart when sitting. [x] Avoid prolonged standing in one place. Dietary:  [] Diet as tolerated: [x] Calorie Diabetic Diet: [x] No Added Salt:  [x] Increase Protein: [] Other:     Activity:  [x] Activity as tolerated:    [] Patient has no activity restrictions       [] Strict Bedrest:   [] Remain off Work:       [] May return to full duty work:                                     [] Return to work with restrictions:               [address] Information: Should you experience any significant changes in your wound(s) or have questions about your wound care, please contact [address] at [place] [address] 8:00 am - 4:30. If you need help with your wound outside of these hours and cannot wait until we are again available, contact your PCP or go to the hospital emergency room. PLEASE NOTE: IF YOU ARE UNABLE TO OBTAIN WOUND SUPPLIES, CONTINUE TO USE THE SUPPLIES YOU HAVE AVAILABLE UNTIL YOU ARE ABLE TO REACH US. IT IS MOST IMPORTANT TO KEEP THE WOUND COVERED AT ALL TIMES.     [x] Dr. Alyssia Purcell   [] Dr. Saeid Montiel  [] Dr. Bandar Razo

## 2022-12-14 NOTE — WOUND CARE
Multilayer Compression Wrap   (Not Unna) Below the Knee    NAME:  Ruth Ervin OF BIRTH:  1955  MEDICAL RECORD NUMBER:  [id number]  DATE:  12/14/2022    Removed old Multilayer wrap if indicated and wash leg with mild soap/water. Applied primary and secondary dressing as ordered. Applied multilayered dressing below the knee to left lower leg. Instructed patient/caregiver not to remove dressing and to keep it clean and dry. Instructed patient/caregiver on complications to report to provider, such as pain, numbness in toes, heavy drainage, and slippage of dressing. Instructed patient on purpose of compression dressing and on activity and exercise recommendations.     Response to treatment: Well tolerated by patient       Electronically signed by Jadiel Henson RN on 12/14/2022 at 3:09 PM

## 2022-12-14 NOTE — WOUND CARE
Discharge Instructions  7 [facility], [facility]  Telephone: [phone]    NAME:  Gera Koroma OF BIRTH:  1955  MEDICAL RECORD NUMBER:  [id number]  DATE: 12/14/2022       Return Appointment:  [] Dressing supply provider:   [x] Home Healthcare: [address] [facility]  please order tubi    [x] Return Appointment: 1 Week(s)  [] Nurse Visit:       [] Discharge from [facility]  [] Ordered tests:    [] Referrals:                    [] Rx:        Wound Cleansing:   Do not scrub or use excessive force. Cleanse wound prior to applying a clean dressing with:  [x] Normal Saline   [x] Mild Soap & Water/Baby Shampoo    [] Keep Wound Dry in Shower  [] Other:      Topical Treatments:  Do not apply lotions, creams, or ointments to wound bed unless directed. [x] Apply moisturizing lotion to skin surrounding the wound prior to dressing change.  [] Apply antifungal ointment to skin surrounding the wound prior to dressing change.  [] Apply thin film of moisture barrier ointment to skin immediately around wound. [] Other:  Betadine to israel-wound     Dressings:           Wound Location: Left leg (Right Leg - healed)  [x] Apply Primary Dressing:       [x] MediHoney Gel    [] MediHoney Alginate               [] Calcium Alginate      [] Calcium Alginate with silver- Silvercel   [] Collagen                   [] Collagen with Silver                [] Santyl with Moistened saline gauze              [] Hydrofera Blue (cut to size and moistened)  [] Hydrofera Blue Ready (Cut to size)   [] NS wet to dry    [] Betadine wet to dry              [] Hydrogel                [] Mepitel     [] Bactroban/Mupirocin               [] Other:     [] Cover and Secure with:     [x] Gauze [x] Cristian [] Kerlix   [] Foam [] Super Absorbant [] ABD     [] ACE Wrap            [] Other:    Avoid contact of tape with skin.     [x] Change dressing: [] Daily    [] Every Other Day [] Once per week   [x] Twice per week   [] Three times per week [] Do Not Change Dressing   [] Other:      Compression:  Apply: [x] Multilayer Compression Wrap: [x]Right Leg D Tubi []                                 []Profore   [x] profore lite  (coflex lite) Left       []Unna     [x] Do not get leg(s) with wrap wet. If wraps become too tight call the center or completely remove the wrap. [x] Elevate leg(s) above the level of the heart when sitting. [x] Avoid prolonged standing in one place. Dietary:  [] Diet as tolerated: [x] Calorie Diabetic Diet: [x] No Added Salt:  [x] Increase Protein: [] Other:     Activity:  [x] Activity as tolerated:    [] Patient has no activity restrictions       [] Strict Bedrest:   [] Remain off Work:       [] May return to full duty work:                                     [] Return to work with restrictions:               [address] Information: Should you experience any significant changes in your wound(s) or have questions about your wound care, please contact Thais Doyle [address] at [place] [address] 8:00 am - 4:30. If you need help with your wound outside of these hours and cannot wait until we are again available, contact your PCP or go to the hospital emergency room. PLEASE NOTE: IF YOU ARE UNABLE TO OBTAIN WOUND SUPPLIES, CONTINUE TO USE THE SUPPLIES YOU HAVE AVAILABLE UNTIL YOU ARE ABLE TO REACH US. IT IS MOST IMPORTANT TO KEEP THE WOUND COVERED AT ALL TIMES.     [x] Dr. Yuni Ortiz   [] Dr. Indra Mike  [] Dr. Nathan Madrigal

## 2022-12-21 ENCOUNTER — HOSPITAL ENCOUNTER (OUTPATIENT)
Dept: WOUND CARE | Age: 67
Discharge: HOME | End: 2022-12-21
Payer: MEDICARE

## 2022-12-21 VITALS
TEMPERATURE: 98.2 F | OXYGEN SATURATION: 95 % | SYSTOLIC BLOOD PRESSURE: 159 MMHG | RESPIRATION RATE: 22 BRPM | DIASTOLIC BLOOD PRESSURE: 81 MMHG | HEART RATE: 82 BPM

## 2022-12-21 DIAGNOSIS — R60.0 LOCALIZED EDEMA: ICD-10-CM

## 2022-12-21 DIAGNOSIS — I87.312 IDIOPATHIC CHRONIC VENOUS HYPERTENSION OF LEFT LOWER EXTREMITY WITH ULCER (HCC): ICD-10-CM

## 2022-12-21 DIAGNOSIS — L97.412 ULCER OF RIGHT HEEL, WITH FAT LAYER EXPOSED (HCC): ICD-10-CM

## 2022-12-21 DIAGNOSIS — L97.522 ULCER OF LEFT FOOT, WITH FAT LAYER EXPOSED (HCC): ICD-10-CM

## 2022-12-21 DIAGNOSIS — L97.812 NON-PRESSURE CHRONIC ULCER OF OTHER PART OF RIGHT LOWER LEG WITH FAT LAYER EXPOSED (HCC): Primary | ICD-10-CM

## 2022-12-21 DIAGNOSIS — L97.929 IDIOPATHIC CHRONIC VENOUS HYPERTENSION OF LEFT LOWER EXTREMITY WITH ULCER (HCC): ICD-10-CM

## 2022-12-21 DIAGNOSIS — L97.822 NON-PRESSURE CHRONIC ULCER OF OTHER PART OF LEFT LOWER LEG WITH FAT LAYER EXPOSED (HCC): ICD-10-CM

## 2022-12-21 PROCEDURE — 74011000250 HC RX REV CODE- 250: Performed by: SPECIALIST

## 2022-12-21 PROCEDURE — 29581 APPL MULTLAYER CMPRN SYS LEG: CPT

## 2022-12-21 RX ORDER — LIDOCAINE HYDROCHLORIDE 20 MG/ML
15 SOLUTION OROPHARYNGEAL ONCE
OUTPATIENT
Start: 2022-12-21 | End: 2022-12-21

## 2022-12-21 RX ORDER — LIDOCAINE HYDROCHLORIDE 20 MG/ML
15 SOLUTION OROPHARYNGEAL ONCE
Status: COMPLETED | OUTPATIENT
Start: 2022-12-21 | End: 2022-12-21

## 2022-12-21 RX ORDER — SILVER SULFADIAZINE 10 G/1000G
CREAM TOPICAL ONCE
OUTPATIENT
Start: 2022-12-21 | End: 2022-12-21

## 2022-12-21 RX ORDER — MUPIROCIN 20 MG/G
OINTMENT TOPICAL ONCE
OUTPATIENT
Start: 2022-12-21 | End: 2022-12-21

## 2022-12-21 RX ADMIN — LIDOCAINE HYDROCHLORIDE 15 ML: 20 SOLUTION ORAL; TOPICAL at 14:52

## 2022-12-21 NOTE — DISCHARGE INSTRUCTIONS
Discharge Instructions  [address] [place] [address], [address] [place] [address]  Telephone: [phone]    NAME:  Winnie Simpson OF BIRTH:  1955  MEDICAL RECORD NUMBER:  [id number]  DATE: 12/21/2022       Return Appointment:  [] Dressing supply provider:   [x] Home Healthcare: [address] [facility]    [x] Return Appointment: 2 Week(s)  [] Nurse Visit:       [] Discharge from [facility]  [] Ordered tests:    [] Referrals:                    [] Rx:        Wound Cleansing:   Do not scrub or use excessive force. Cleanse wound prior to applying a clean dressing with:  [x] Normal Saline   [x] Mild Soap & Water/Baby Shampoo    [] Keep Wound Dry in Shower  [] Other:      Topical Treatments:  Do not apply lotions, creams, or ointments to wound bed unless directed. [x] Apply moisturizing lotion to skin surrounding the wound prior to dressing change.  [] Apply antifungal ointment to skin surrounding the wound prior to dressing change.  [] Apply thin film of moisture barrier ointment to skin immediately around wound. [] Other:  Betadine to israel-wound     Dressings:           Wound Location: Left leg (Right Leg - healed)  [x] Apply Primary Dressing:       [] MediHoney Gel    [] MediHoney Alginate               [] Calcium Alginate      [x] Calcium Alginate with silver- Silvercel   [] Collagen                   [] Collagen with Silver                [] Santyl with Moistened saline gauze              [] Hydrofera Blue (cut to size and moistened)  [] Hydrofera Blue Ready (Cut to size)   [] NS wet to dry    [] Betadine wet to dry              [] Hydrogel                [] Mepitel     [] Bactroban/Mupirocin               [] Other:     [] Cover and Secure with:     [x] Gauze [x] Cristian [] Kerlix   [] Foam [] Super Absorbant [] ABD     [] ACE Wrap            [] Other:    Avoid contact of tape with skin.     [x] Change dressing: [] Daily    [] Every Other Day [] Once per week   [x] Twice per week   [] Three times per week [] Do Not Change Dressing   [] Other:      Compression:  Apply: [x] Multilayer Compression Wrap: [x]Right Leg D Tubi []                                 []Profore   [x] profore lite  (coflex lite) Left       []Unna     [x] Do not get leg(s) with wrap wet. If wraps become too tight call the center or completely remove the wrap. [x] Elevate leg(s) above the level of the heart when sitting. [x] Avoid prolonged standing in one place. Dietary:  [] Diet as tolerated: [x] Calorie Diabetic Diet: [x] No Added Salt:  [x] Increase Protein: [] Other:     Activity:  [x] Activity as tolerated:    [] Patient has no activity restrictions       [] Strict Bedrest:   [] Remain off Work:       [] May return to full duty work:                                     [] Return to work with restrictions:               [address] Information: Should you experience any significant changes in your wound(s) or have questions about your wound care, please contact Thais Doyle [address] at [place] [address] 8:00 am - 4:30. If you need help with your wound outside of these hours and cannot wait until we are again available, contact your PCP or go to the hospital emergency room. PLEASE NOTE: IF YOU ARE UNABLE TO OBTAIN WOUND SUPPLIES, CONTINUE TO USE THE SUPPLIES YOU HAVE AVAILABLE UNTIL YOU ARE ABLE TO REACH US. IT IS MOST IMPORTANT TO KEEP THE WOUND COVERED AT ALL TIMES.     [x] Dr. Sudarshan Pascual   [] Dr. Crissie Soulier  [] Dr. Linda Quinteros

## 2022-12-21 NOTE — WOUND CARE
[address]   Progress Note and Procedure Note   NO Procedure      [address] RECORD NUMBER:  [id number]  AGE: 75 y.o. RACE WHITE/NON-  GENDER: male  : 1955  EPISODE DATE:  2022    Subjective:   No new problems    Chief Complaint   Patient presents with    Wound Check     L lower leg         HISTORY of PRESENT ILLNESS HPI    Ann Gonsalez is a 75 y.o. male who presents today for wound/ulcer evaluation.    History of Wound Context: LLE  Wound/Ulcer Pain Timing/Severity: none  Quality of pain: dull  Severity:  0 / 10   Modifying Factors: None  Associated Signs/Symptoms: edema    Ulcer Identification:  Ulcer Type: venous    Contributing Factors: edema    Wound: LLE         PAST MEDICAL HISTORY    Past Medical History:   Diagnosis Date    Arthritis     CAD (coronary artery disease)     Chronic obstructive pulmonary disease (HCC)     Diabetes (HCC)     Gout     Hypertension     Ill-defined condition     Sleep apnea         PAST SURGICAL HISTORY    Past Surgical History:   Procedure Laterality Date    HX ORTHOPAEDIC      HX PACEMAKER      HX VEIN ABLATION ADHESIVE         FAMILY HISTORY    Family History   Problem Relation Age of Onset    Heart Disease Mother     Kidney Disease Mother     Hypertension Mother     Diabetes Mother     Heart Disease Father     Hypertension Father     Diabetes Sister     Diabetes Brother        SOCIAL HISTORY    Social History     Tobacco Use    Smoking status: Never    Smokeless tobacco: Never   Vaping Use    Vaping Use: Never used   Substance Use Topics    Alcohol use: Not Currently    Drug use: Never       ALLERGIES    Allergies   Allergen Reactions    Elavil Angioedema    Naproxen Unknown (comments)     Pt not sure of reaction    Sulfa (Sulfonamide Antibiotics) Nausea and Vomiting       MEDICATIONS    Current Outpatient Medications on File Prior to Encounter   Medication Sig Dispense Refill    oxyCODONE IR (ROXICODONE) 10 mg tab immediate release tablet Take 10 mg by mouth every eight (8) hours as needed for Pain. insulin glargine (LANTUS) 100 unit/mL injection 27-unit subcu at bedtime 1 mL 2    insulin glargine,hum.rec.anlog (TOUJEO MAX U-300 SOLOSTAR SC) 80 Units by SubCUTAneous route two (2) times a day. 80 units in am, 34 units in pm      b complex vitamins tablet Take 1 Tablet by mouth daily. ammonium lactate (AMLACTIN) 12 % topical cream Apply  to affected area as needed for Other (xerosis). rub in to affected area well  Indications: dry skin 280 g 0    vitamin E, dl,tocopheryl acet, (vitamin E acetate) 400 unit capsule Take 2 Capsules by mouth two (2) times a day. 100 Capsule 0    ascorbic acid, vitamin C, (VITAMIN C) 500 mg tablet Take 1,000 mg by mouth two (2) times a day. Indications: inadequate vitamin C      tamsulosin (FLOMAX) 0.4 mg capsule Take 0.8 mg by mouth daily. tiotropium (SPIRIVA) 18 mcg inhalation capsule Take 1 Capsule by inhalation daily. bumetanide (BUMEX) 2 mg tablet Take 2 mg by mouth two (2) times a day. ferrous sulfate (Iron) 325 mg (65 mg iron) tablet Take  by mouth two (2) times daily (after meals). aspirin 81 mg chewable tablet Take 81 mg by mouth daily. flunisolide (NASAREL) 25 mcg (0.025 %) spry 2 Sprays two (2) times a day. albuterol (PROVENTIL HFA, VENTOLIN HFA, PROAIR HFA) 90 mcg/actuation inhaler Take 1 Puff by inhalation every four (4) hours as needed for Wheezing or Shortness of Breath. colchicine 0.6 mg tablet Take 0.6 mg by mouth daily. gabapentin (NEURONTIN) 300 mg capsule Take 300 mg by mouth four (4) times daily. insulin lispro (HUMALOG) 100 unit/mL injection by SubCUTAneous route. Sliding scale       No current facility-administered medications on file prior to encounter. REVIEW OF SYSTEMS    Pertinent items are noted in HPI. Objective:     Visit Vitals  BP (!) 159/81 (BP 1 Location: Left upper arm, BP Patient Position:  At rest;Sitting)   Pulse 82   Temp 98.2 °F (36.8 °C)   Resp 22   SpO2 95%       Wt Readings from Last 3 Encounters:   07/25/22 157.4 kg (347 lb)   07/21/22 157.4 kg (347 lb)   07/11/22 157.4 kg (347 lb)       PHYSICAL EXAM  L leg wound is slightly increased in area measurement, but quite small, no infection  Pertinent items are noted in HPI. 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 ([address]    Relevant Medications    lidocaine (XYLOCAINE) 2 % viscous solution 15 mL (Completed)    Other Relevant Orders    INITIATE OUTPATIENT WOUND CARE PROTOCOL    Ulcer of left foot, with fat layer exposed ([address]    Relevant Medications    lidocaine (XYLOCAINE) 2 % viscous solution 15 mL (Completed)    Other Relevant Orders    INITIATE OUTPATIENT WOUND CARE PROTOCOL    Ulcer of right heel, with fat layer exposed (HCC)    Relevant Medications    lidocaine (XYLOCAINE) 2 % viscous solution 15 mL (Completed)    Other Relevant Orders    INITIATE OUTPATIENT WOUND CARE PROTOCOL    Non-pressure chronic ulcer of other part of right lower leg with fat layer exposed (HCC) - Primary    Relevant Medications    lidocaine (XYLOCAINE) 2 % viscous solution 15 mL (Completed)    Other Relevant Orders    INITIATE OUTPATIENT WOUND CARE PROTOCOL       Procedure Note  Indications:  Based on my examination of this patient's wound(s)/ulcer(s) today, debridement is not required to promote healing and evaluate the wound base.     Wound Leg lower Left;Lateral #1 (Active)   Wound Image   12/21/22 1448   Wound Etiology Venous 12/21/22 1449   Dressing Status Clean;Dry; Intact 12/21/22 1449   Cleansed Soap and water 12/21/22 1449   Wound Length (cm) 0.7 cm 12/21/22 1449   Wound Width (cm) 0.4 cm 12/21/22 1449   Wound Depth (cm) 0.1 cm 12/21/22 1449   Wound Surface Area (cm^2) 0.28 cm^2 12/21/22 1449   Change in Wound Size % 99.75 12/21/22 1449   Wound Volume (cm^3) 0.028 cm^3 12/21/22 1449   Wound Healing % 100 12/21/22 1449   Post-Procedure Length (cm) 10.2 cm 07/20/22 1519   Post-Procedure Width (cm) 5 cm 07/20/22 1519   Post-Procedure Depth (cm) 0.1 cm 07/20/22 1519   Post-Procedure Surface Area (cm^2) 51 cm^2 07/20/22 1519   Post-Procedure Volume (cm^3) 5.1 cm^3 07/20/22 1519   Wound Assessment Pink/red;Granulation tissue 12/21/22 1449   Drainage Amount Small 12/21/22 1449   Drainage Description Serosanguinous 12/21/22 1449   Wound Odor None 12/21/22 1449   Alissa-Wound/Incision Assessment Intact;Dry/flaky 12/21/22 1449   Edges Attached edges 12/21/22 1449   Wound Thickness Description Full thickness 12/21/22 1449   Number of days: 294        Plan:   Switch to silvercel, continue Coflex lite    Treatment Note please see attached Discharge Instructions    Written patient dismissal instructions given to patient or POA.          Electronically signed by Thelma Roy MD on 12/21/2022 at 3:30 PM

## 2022-12-21 NOTE — WOUND CARE
Discharge Instructions  [facility], [address] [place] [address]  Telephone: [phone]    NAME:  Jameel Garcia OF BIRTH:  1955  MEDICAL RECORD NUMBER:  [id number]  DATE: 12/21/2022       Return Appointment:  [] Dressing supply provider:   [x] Home Healthcare: [address] [facility]    [x] Return Appointment: 2 Week(s)  [] Nurse Visit:       [] Discharge from [facility]  [] Ordered tests:    [] Referrals:                    [] Rx:        Wound Cleansing:   Do not scrub or use excessive force. Cleanse wound prior to applying a clean dressing with:  [x] Normal Saline   [x] Mild Soap & Water/Baby Shampoo    [] Keep Wound Dry in Shower  [] Other:      Topical Treatments:  Do not apply lotions, creams, or ointments to wound bed unless directed. [x] Apply moisturizing lotion to skin surrounding the wound prior to dressing change.  [] Apply antifungal ointment to skin surrounding the wound prior to dressing change.  [] Apply thin film of moisture barrier ointment to skin immediately around wound. [] Other:  Betadine to israel-wound     Dressings:           Wound Location: Left leg (Right Leg - healed)  [x] Apply Primary Dressing:       [] MediHoney Gel    [] MediHoney Alginate               [] Calcium Alginate      [x] Calcium Alginate with silver- Silvercel   [] Collagen                   [] Collagen with Silver                [] Santyl with Moistened saline gauze              [] Hydrofera Blue (cut to size and moistened)  [] Hydrofera Blue Ready (Cut to size)   [] NS wet to dry    [] Betadine wet to dry              [] Hydrogel                [] Mepitel     [] Bactroban/Mupirocin               [] Other:     [] Cover and Secure with:     [x] Gauze [x] Cristian [] Kerlix   [] Foam [] Super Absorbant [] ABD     [] ACE Wrap            [] Other:    Avoid contact of tape with skin.     [x] Change dressing: [] Daily    [] Every Other Day [] Once per week   [x] Twice per week   [] Three times per week [] Do Not Change Dressing   [] Other:      Compression:  Apply: [x] Multilayer Compression Wrap: [x]Right Leg D Tubi []                                 []Profore   [x] profore lite  (coflex lite) Left       []Unna     [x] Do not get leg(s) with wrap wet. If wraps become too tight call the center or completely remove the wrap. [x] Elevate leg(s) above the level of the heart when sitting. [x] Avoid prolonged standing in one place. Dietary:  [] Diet as tolerated: [x] Calorie Diabetic Diet: [x] No Added Salt:  [x] Increase Protein: [] Other:     Activity:  [x] Activity as tolerated:    [] Patient has no activity restrictions       [] Strict Bedrest:   [] Remain off Work:       [] May return to full duty work:                                     [] Return to work with restrictions:               [facility] Information: Should you experience any significant changes in your wound(s) or have questions about your wound care, please contact [address] at [place] [address] 8:00 am - 4:30. If you need help with your wound outside of these hours and cannot wait until we are again available, contact your PCP or go to the hospital emergency room. PLEASE NOTE: IF YOU ARE UNABLE TO OBTAIN WOUND SUPPLIES, CONTINUE TO USE THE SUPPLIES YOU HAVE AVAILABLE UNTIL YOU ARE ABLE TO REACH US. IT IS MOST IMPORTANT TO KEEP THE WOUND COVERED AT ALL TIMES.     [x] Dr. Felisa Norwood   [] Dr. Patricia Booker  [] Dr. Anderson Mcclure

## 2022-12-21 NOTE — WOUND CARE
Multilayer Compression Wrap   (Not Unna) Below the Knee    NAME:  Laurita Parnell OF BIRTH:  1955  MEDICAL RECORD NUMBER:  [id number]  DATE:  12/21/2022    Removed old Multilayer wrap if indicated and wash leg with mild soap/water. Applied primary and secondary dressing as ordered. Applied multilayered dressing below the knee to left lower leg. Instructed patient/caregiver not to remove dressing and to keep it clean and dry. Instructed patient/caregiver on complications to report to provider, such as pain, numbness in toes, heavy drainage, and slippage of dressing. Instructed patient on purpose of compression dressing and on activity and exercise recommendations.     Response to treatment: Well tolerated by patient       Electronically signed by Sergio Harding RN on 12/21/2022 at 3:13 PM

## 2023-01-11 ENCOUNTER — HOSPITAL ENCOUNTER (OUTPATIENT)
Dept: WOUND CARE | Age: 68
Discharge: HOME | End: 2023-01-11
Payer: MEDICARE

## 2023-01-11 VITALS
DIASTOLIC BLOOD PRESSURE: 87 MMHG | SYSTOLIC BLOOD PRESSURE: 139 MMHG | RESPIRATION RATE: 20 BRPM | TEMPERATURE: 95.7 F | HEART RATE: 85 BPM

## 2023-01-11 DIAGNOSIS — L97.412 ULCER OF RIGHT HEEL, WITH FAT LAYER EXPOSED (HCC): ICD-10-CM

## 2023-01-11 DIAGNOSIS — L97.812 NON-PRESSURE CHRONIC ULCER OF OTHER PART OF RIGHT LOWER LEG WITH FAT LAYER EXPOSED (HCC): Primary | ICD-10-CM

## 2023-01-11 DIAGNOSIS — I87.312 IDIOPATHIC CHRONIC VENOUS HYPERTENSION OF LEFT LOWER EXTREMITY WITH ULCER (HCC): ICD-10-CM

## 2023-01-11 DIAGNOSIS — L97.822 NON-PRESSURE CHRONIC ULCER OF OTHER PART OF LEFT LOWER LEG WITH FAT LAYER EXPOSED (HCC): ICD-10-CM

## 2023-01-11 DIAGNOSIS — L97.929 IDIOPATHIC CHRONIC VENOUS HYPERTENSION OF LEFT LOWER EXTREMITY WITH ULCER (HCC): ICD-10-CM

## 2023-01-11 DIAGNOSIS — L97.522 ULCER OF LEFT FOOT, WITH FAT LAYER EXPOSED (HCC): ICD-10-CM

## 2023-01-11 DIAGNOSIS — R60.0 LOCALIZED EDEMA: ICD-10-CM

## 2023-01-11 PROCEDURE — 29581 APPL MULTLAYER CMPRN SYS LEG: CPT

## 2023-01-11 PROCEDURE — 74011000250 HC RX REV CODE- 250: Performed by: SPECIALIST

## 2023-01-11 RX ORDER — SILVER SULFADIAZINE 10 G/1000G
CREAM TOPICAL ONCE
OUTPATIENT
Start: 2023-01-11 | End: 2023-01-11

## 2023-01-11 RX ORDER — LIDOCAINE HYDROCHLORIDE 20 MG/ML
15 SOLUTION OROPHARYNGEAL ONCE
OUTPATIENT
Start: 2023-01-11 | End: 2023-01-11

## 2023-01-11 RX ORDER — MUPIROCIN 20 MG/G
OINTMENT TOPICAL ONCE
OUTPATIENT
Start: 2023-01-11 | End: 2023-01-11

## 2023-01-11 RX ORDER — LIDOCAINE HYDROCHLORIDE 20 MG/ML
15 SOLUTION OROPHARYNGEAL ONCE
Status: DISCONTINUED | OUTPATIENT
Start: 2023-01-11 | End: 2023-01-12 | Stop reason: HOSPADM

## 2023-01-11 NOTE — WOUND CARE
[address]   Progress Note and Procedure Note   NO Procedure      [address] RECORD NUMBER:  [id number]  AGE: 75 y.o. RACE WHITE/NON-  GENDER: male  : 1955  EPISODE DATE:  2023    Subjective:   No new problems  Chief Complaint   Patient presents with    Wound Check     Left leg         HISTORY of PRESENT ILLNESS HPI    Diane Ramirez is a 75 y.o. male who presents today for wound/ulcer evaluation.    History of Wound Context: LLE  Wound/Ulcer Pain Timing/Severity: none  Quality of pain: dull  Severity:  0 / 10   Modifying Factors: None  Associated Signs/Symptoms: edema    Ulcer Identification:  Ulcer Type: venous    Contributing Factors: lymphedema    Wound: LLE         PAST MEDICAL HISTORY    Past Medical History:   Diagnosis Date    Arthritis     CAD (coronary artery disease)     Chronic obstructive pulmonary disease (HCC)     Diabetes (HCC)     Gout     Hypertension     Ill-defined condition     Sleep apnea         PAST SURGICAL HISTORY    Past Surgical History:   Procedure Laterality Date    HX ORTHOPAEDIC      HX PACEMAKER      HX VEIN ABLATION ADHESIVE         FAMILY HISTORY    Family History   Problem Relation Age of Onset    Heart Disease Mother     Kidney Disease Mother     Hypertension Mother     Diabetes Mother     Heart Disease Father     Hypertension Father     Diabetes Sister     Diabetes Brother        SOCIAL HISTORY    Social History     Tobacco Use    Smoking status: Never    Smokeless tobacco: Never   Vaping Use    Vaping Use: Never used   Substance Use Topics    Alcohol use: Not Currently    Drug use: Never       ALLERGIES    Allergies   Allergen Reactions    Elavil Angioedema    Naproxen Unknown (comments)     Pt not sure of reaction    Sulfa (Sulfonamide Antibiotics) Nausea and Vomiting       MEDICATIONS    Current Outpatient Medications on File Prior to Encounter   Medication Sig Dispense Refill    oxyCODONE IR (ROXICODONE) 10 mg tab immediate release tablet Take 10 mg by mouth every eight (8) hours as needed for Pain. insulin glargine (LANTUS) 100 unit/mL injection 27-unit subcu at bedtime 1 mL 2    insulin glargine,hum.rec.anlog (TOUJEO MAX U-300 SOLOSTAR SC) 80 Units by SubCUTAneous route two (2) times a day. 80 units in am, 34 units in pm      b complex vitamins tablet Take 1 Tablet by mouth daily. ammonium lactate (AMLACTIN) 12 % topical cream Apply  to affected area as needed for Other (xerosis). rub in to affected area well  Indications: dry skin 280 g 0    vitamin E, dl,tocopheryl acet, (vitamin E acetate) 400 unit capsule Take 2 Capsules by mouth two (2) times a day. 100 Capsule 0    ascorbic acid, vitamin C, (VITAMIN C) 500 mg tablet Take 1,000 mg by mouth two (2) times a day. Indications: inadequate vitamin C      tamsulosin (FLOMAX) 0.4 mg capsule Take 0.8 mg by mouth daily. tiotropium (SPIRIVA) 18 mcg inhalation capsule Take 1 Capsule by inhalation daily. bumetanide (BUMEX) 2 mg tablet Take 2 mg by mouth two (2) times a day. ferrous sulfate (Iron) 325 mg (65 mg iron) tablet Take  by mouth two (2) times daily (after meals). aspirin 81 mg chewable tablet Take 81 mg by mouth daily. flunisolide (NASAREL) 25 mcg (0.025 %) spry 2 Sprays two (2) times a day. albuterol (PROVENTIL HFA, VENTOLIN HFA, PROAIR HFA) 90 mcg/actuation inhaler Take 1 Puff by inhalation every four (4) hours as needed for Wheezing or Shortness of Breath. colchicine 0.6 mg tablet Take 0.6 mg by mouth daily. gabapentin (NEURONTIN) 300 mg capsule Take 300 mg by mouth four (4) times daily. insulin lispro (HUMALOG) 100 unit/mL injection by SubCUTAneous route. Sliding scale       No current facility-administered medications on file prior to encounter. REVIEW OF SYSTEMS    Pertinent items are noted in HPI.     Objective:     Visit Vitals  /87 (BP 1 Location: Right arm, BP Patient Position: At rest;Sitting) Pulse 85   Temp (!) 95.7 °F (35.4 °C)   Resp 20       Wt Readings from Last 3 Encounters:   07/25/22 157.4 kg (347 lb)   07/21/22 157.4 kg (347 lb)   07/11/22 157.4 kg (347 lb)       PHYSICAL EXAM  L leg wound is very small  Pertinent items are noted in HPI. 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 (HCC)    Relevant Medications    lidocaine (XYLOCAINE) 2 % viscous solution 15 mL    Other Relevant Orders    INITIATE OUTPATIENT WOUND CARE PROTOCOL    Ulcer of left foot, with fat layer exposed (HCC)    Relevant Medications    lidocaine (XYLOCAINE) 2 % viscous solution 15 mL    Other Relevant Orders    INITIATE OUTPATIENT WOUND CARE PROTOCOL    Ulcer of right heel, with fat layer exposed (HCC)    Relevant Medications    lidocaine (XYLOCAINE) 2 % viscous solution 15 mL    Other Relevant Orders    INITIATE OUTPATIENT WOUND CARE PROTOCOL    Non-pressure chronic ulcer of other part of right lower leg with fat layer exposed (HCC) - Primary    Relevant Medications    lidocaine (XYLOCAINE) 2 % viscous solution 15 mL    Other Relevant Orders    INITIATE OUTPATIENT WOUND CARE PROTOCOL       Procedure Note  Indications:  Based on my examination of this patient's wound(s)/ulcer(s) today, debridement is not required to promote healing and evaluate the wound base. Wound Leg lower Left;Lateral #1 (Active)   Wound Image   01/11/23 1352   Wound Etiology Venous 01/11/23 1352   Dressing Status Clean;Dry; Intact 01/11/23 1352 Cleansed Soap and water 01/11/23 1352   Wound Length (cm) 0.4 cm 01/11/23 1352   Wound Width (cm) 0.3 cm 01/11/23 1352   Wound Depth (cm) 0.1 cm 01/11/23 1352   Wound Surface Area (cm^2) 0.12 cm^2 01/11/23 1352   Change in Wound Size % 99.89 01/11/23 1352   Wound Volume (cm^3) 0.012 cm^3 01/11/23 1352   Wound Healing % 100 01/11/23 1352   Post-Procedure Length (cm) 10.2 cm 07/20/22 1519   Post-Procedure Width (cm) 5 cm 07/20/22 1519   Post-Procedure Depth (cm) 0.1 cm 07/20/22 1519   Post-Procedure Surface Area (cm^2) 51 cm^2 07/20/22 1519   Post-Procedure Volume (cm^3) 5.1 cm^3 07/20/22 1519   Wound Assessment Pink/red;Granulation tissue 01/11/23 1352   Drainage Amount Small 01/11/23 1352   Drainage Description Serosanguinous 01/11/23 1352   Wound Odor None 01/11/23 1352   Alissa-Wound/Incision Assessment Intact;Dry/flaky 01/11/23 1352   Edges Attached edges 01/11/23 1352   Wound Thickness Description Full thickness 01/11/23 1352   Number of days: 315        Plan:   Continue Aquacel, Coflex lite    Treatment Note please see attached Discharge Instructions    Written patient dismissal instructions given to patient or POA.          Electronically signed by Kinjal Downs MD on 1/11/2023 at 3:01 PM

## 2023-01-11 NOTE — WOUND CARE
Multilayer Compression Wrap   (Not Unna) Below the Knee    NAME:  Todd Bible OF BIRTH:  1955  MEDICAL RECORD NUMBER:  [id number]  DATE:  1/11/2023    Removed old Multilayer wrap if indicated and wash leg with mild soap/water. Applied moisturizing agent to dry skin as needed. Applied primary and secondary dressing as ordered. Applied multilayered dressing below the knee to left lower leg. Instructed patient/caregiver not to remove dressing and to keep it clean and dry. Instructed patient/caregiver on complications to report to provider, such as pain, numbness in toes, heavy drainage, and slippage of dressing. Instructed patient on purpose of compression dressing and on activity and exercise recommendations.     Response to treatment: Well tolerated by patient       Electronically signed by Moni Diamond LPN on 8/20/8172 at 4:42 PM

## 2023-01-11 NOTE — WOUND CARE
Discharge Instructions  [address] [place] [address], [facility]  Telephone: [phone]    NAME:  Winnie Simpson OF BIRTH:  1955  MEDICAL RECORD NUMBER:  [id number]  DATE: 01/11/2022       Return Appointment:  [] Dressing supply provider:   [x] [facility] [address] [facility]    [x] Return Appointment: 2 Week(s)  [] Nurse Visit:       [] Discharge from [facility]  [] Ordered tests:    [] Referrals:                    [] Rx:        Wound Cleansing:   Do not scrub or use excessive force. Cleanse wound prior to applying a clean dressing with:  [x] Normal Saline   [x] Mild Soap & Water/Baby Shampoo    [] Keep Wound Dry in Shower  [] Other:      Topical Treatments:  Do not apply lotions, creams, or ointments to wound bed unless directed. [x] Apply moisturizing lotion to skin surrounding the wound prior to dressing change.  [] Apply antifungal ointment to skin surrounding the wound prior to dressing change.  [] Apply thin film of moisture barrier ointment to skin immediately around wound. [] Other:  Betadine to israel-wound     Dressings:           Wound Location: Left leg (Right Leg - healed)  [x] Apply Primary Dressing:       [] MediHoney Gel    [] MediHoney Alginate               [] Calcium Alginate      [x] Calcium Alginate with silver- Silvercel   [] Collagen                   [] Collagen with Silver                [] Santyl with Moistened saline gauze              [] Hydrofera Blue (cut to size and moistened)  [] Hydrofera Blue Ready (Cut to size)   [] NS wet to dry    [] Betadine wet to dry              [] Hydrogel                [] Mepitel     [] Bactroban/Mupirocin               [] Other:     [] Cover and Secure with:     [x] Gauze [x] Cristian [] Kerlix   [] Foam [] Super Absorbant [] ABD     [] ACE Wrap            [] Other:    Avoid contact of tape with skin.     [x] Change dressing: [] Daily    [] Every Other Day [] Once per week   [x] Twice per week   [] Three times per week [] Do Not Change Dressing   [] Other:      Compression:  Apply: [x] Multilayer Compression Wrap: [x]Right Leg D Tubi []                                 []Profore   [x] profore lite  (coflex lite) Left       []Unna     [x] Do not get leg(s) with wrap wet. If wraps become too tight call the center or completely remove the wrap. [x] Elevate leg(s) above the level of the heart when sitting. [x] Avoid prolonged standing in one place. Dietary:  [] Diet as tolerated: [x] Calorie Diabetic Diet: [x] No Added Salt:  [x] Increase Protein: [] Other:     Activity:  [x] Activity as tolerated:    [] Patient has no activity restrictions       [] Strict Bedrest:   [] Remain off Work:       [] May return to full duty work:                                     [] Return to work with restrictions:               [address] [facility] [address] Information: Should you experience any significant changes in your wound(s) or have questions about your wound care, please contact Thais Doyle [address] at [place] [address] 8:00 am - 4:30. If you need help with your wound outside of these hours and cannot wait until we are again available, contact your PCP or go to the hospital emergency room. PLEASE NOTE: IF YOU ARE UNABLE TO OBTAIN WOUND SUPPLIES, CONTINUE TO USE THE SUPPLIES YOU HAVE AVAILABLE UNTIL YOU ARE ABLE TO REACH US. IT IS MOST IMPORTANT TO KEEP THE WOUND COVERED AT ALL TIMES.     [x] Dr. Geno Coronado   [] Dr. Rissa Brady  [] Dr. Aliyah Borden

## 2023-01-11 NOTE — DISCHARGE INSTRUCTIONS
Discharge Instructions  [address] [place] [address], [address] [place] [address]  Telephone: [phone]    NAME:  Ruth Ervin OF BIRTH:  1955  MEDICAL RECORD NUMBER:  [id number]  DATE: 01/11/2022       Return Appointment:  [] Dressing supply provider:   [x] Home Healthcare: [facility] [facility]    [x] Return Appointment: 2 Week(s)  [] Nurse Visit:       [] Discharge from [facility]  [] Ordered tests:    [] Referrals:                    [] Rx:        Wound Cleansing:   Do not scrub or use excessive force. Cleanse wound prior to applying a clean dressing with:  [x] Normal Saline   [x] Mild Soap & Water/Baby Shampoo    [] Keep Wound Dry in Shower  [] Other:      Topical Treatments:  Do not apply lotions, creams, or ointments to wound bed unless directed. [x] Apply moisturizing lotion to skin surrounding the wound prior to dressing change.  [] Apply antifungal ointment to skin surrounding the wound prior to dressing change.  [] Apply thin film of moisture barrier ointment to skin immediately around wound. [] Other:  Betadine to israel-wound     Dressings:           Wound Location: Left leg (Right Leg - healed)  [x] Apply Primary Dressing:       [] MediHoney Gel    [] MediHoney Alginate               [] Calcium Alginate      [x] Calcium Alginate with silver- Silvercel   [] Collagen                   [] Collagen with Silver                [] Santyl with Moistened saline gauze              [] Hydrofera Blue (cut to size and moistened)  [] Hydrofera Blue Ready (Cut to size)   [] NS wet to dry    [] Betadine wet to dry              [] Hydrogel                [] Mepitel     [] Bactroban/Mupirocin               [] Other:     [] Cover and Secure with:     [x] Gauze [x] Cristian [] Kerlix   [] Foam [] Super Absorbant [] ABD     [] ACE Wrap            [] Other:    Avoid contact of tape with skin.     [x] Change dressing: [] Daily    [] Every Other Day [] Once per week   [x] Twice per week   [] Three times per week [] Do Not Change Dressing   [] Other:      Compression:  Apply: [x] Multilayer Compression Wrap: [x]Right Leg D Tubi []                                 []Profore   [x] profore lite  (coflex lite) Left       []Unna     [x] Do not get leg(s) with wrap wet. If wraps become too tight call the center or completely remove the wrap. [x] Elevate leg(s) above the level of the heart when sitting. [x] Avoid prolonged standing in one place. Dietary:  [] Diet as tolerated: [x] Calorie Diabetic Diet: [x] No Added Salt:  [x] Increase Protein: [] Other:     Activity:  [x] Activity as tolerated:    [] Patient has no activity restrictions       [] Strict Bedrest:   [] Remain off Work:       [] May return to full duty work:                                     [] Return to work with restrictions:               [facility] Information: Should you experience any significant changes in your wound(s) or have questions about your wound care, please contact Thais Doyle 26 at [place] [phone] 8:00 am - 4:30. If you need help with your wound outside of these hours and cannot wait until we are again available, contact your PCP or go to the hospital emergency room. PLEASE NOTE: IF YOU ARE UNABLE TO OBTAIN WOUND SUPPLIES, CONTINUE TO USE THE SUPPLIES YOU HAVE AVAILABLE UNTIL YOU ARE ABLE TO REACH US. IT IS MOST IMPORTANT TO KEEP THE WOUND COVERED AT ALL TIMES.     [x] Dr. Fran Patel   [] Dr. Cj Telles  [] Dr. Evelyn Ramos

## 2023-02-01 ENCOUNTER — HOSPITAL ENCOUNTER (OUTPATIENT)
Dept: WOUND CARE | Age: 68
Discharge: HOME | End: 2023-02-01
Payer: MEDICARE

## 2023-02-01 VITALS
SYSTOLIC BLOOD PRESSURE: 133 MMHG | TEMPERATURE: 95.5 F | RESPIRATION RATE: 20 BRPM | HEART RATE: 87 BPM | DIASTOLIC BLOOD PRESSURE: 83 MMHG | OXYGEN SATURATION: 98 %

## 2023-02-01 DIAGNOSIS — L97.929 IDIOPATHIC CHRONIC VENOUS HYPERTENSION OF LEFT LOWER EXTREMITY WITH ULCER (HCC): ICD-10-CM

## 2023-02-01 DIAGNOSIS — L97.812 NON-PRESSURE CHRONIC ULCER OF OTHER PART OF RIGHT LOWER LEG WITH FAT LAYER EXPOSED (HCC): Primary | ICD-10-CM

## 2023-02-01 DIAGNOSIS — I87.312 IDIOPATHIC CHRONIC VENOUS HYPERTENSION OF LEFT LOWER EXTREMITY WITH ULCER (HCC): ICD-10-CM

## 2023-02-01 DIAGNOSIS — L97.822 NON-PRESSURE CHRONIC ULCER OF OTHER PART OF LEFT LOWER LEG WITH FAT LAYER EXPOSED (HCC): ICD-10-CM

## 2023-02-01 DIAGNOSIS — L97.412 ULCER OF RIGHT HEEL, WITH FAT LAYER EXPOSED (HCC): ICD-10-CM

## 2023-02-01 DIAGNOSIS — L97.522 ULCER OF LEFT FOOT, WITH FAT LAYER EXPOSED (HCC): ICD-10-CM

## 2023-02-01 DIAGNOSIS — R60.0 LOCALIZED EDEMA: ICD-10-CM

## 2023-02-01 PROCEDURE — 99212 OFFICE O/P EST SF 10 MIN: CPT

## 2023-02-01 PROCEDURE — 74011000250 HC RX REV CODE- 250: Performed by: SPECIALIST

## 2023-02-01 RX ORDER — LIDOCAINE HYDROCHLORIDE 20 MG/ML
15 SOLUTION OROPHARYNGEAL ONCE
Status: CANCELLED | OUTPATIENT
Start: 2023-02-01 | End: 2023-02-01

## 2023-02-01 RX ORDER — LIDOCAINE HYDROCHLORIDE 20 MG/ML
15 SOLUTION OROPHARYNGEAL ONCE
Status: DISCONTINUED | OUTPATIENT
Start: 2023-02-01 | End: 2023-02-02 | Stop reason: HOSPADM

## 2023-02-01 RX ORDER — SILVER SULFADIAZINE 10 G/1000G
CREAM TOPICAL ONCE
Status: CANCELLED | OUTPATIENT
Start: 2023-02-01 | End: 2023-02-01

## 2023-02-01 RX ORDER — MUPIROCIN 20 MG/G
OINTMENT TOPICAL ONCE
Status: CANCELLED | OUTPATIENT
Start: 2023-02-01 | End: 2023-02-01

## 2023-02-01 NOTE — WOUND CARE
Discharge Instructions  [address] [facility], [address] [place] [address]  Telephone: [phone]    NAME:  Lord Lipscomb OF BIRTH:  1955  MEDICAL RECORD NUMBER:  [id number]  DATE: 02/01/2022       Return Appointment:  [] Dressing supply provider:   [x] Home Healthcare: [address] [facility]  discharge  [] Return Appointment:  [facility])  [] Nurse Visit:       [x] Discharge from [facility]  [] Ordered tests:    [] Referrals:                    [] Rx:        Wound Cleansing:   Do not scrub or use excessive force. Cleanse wound prior to applying a clean dressing with:  [] Normal Saline   [] Mild Soap & Water/Baby Shampoo    [] Keep Wound Dry in Shower  [] Other:      Topical Treatments:  Do not apply lotions, creams, or ointments to wound bed unless directed. [x] Apply moisturizing lotion to skin surrounding the wound prior to dressing change.  [] Apply antifungal ointment to skin surrounding the wound prior to dressing change.  [] Apply thin film of moisture barrier ointment to skin immediately around wound. [] Other:  Betadine to israel-wound     Dressings:           Wound Location: Left leg (Right Leg - healed)  [] Apply Primary Dressing:       [] MediHoney Gel    [] MediHoney Alginate               [] Calcium Alginate      [] Calcium Alginate with silver- Silvercel   [] Collagen                   [] Collagen with Silver                [] Santyl with Moistened saline gauze              [] Hydrofera Blue (cut to size and moistened)  [] Hydrofera Blue Ready (Cut to size)   [] NS wet to dry    [] Betadine wet to dry              [] Hydrogel                [] Mepitel     [] Bactroban/Mupirocin               [] Other:     [] Cover and Secure with:     [] Gauze [] Vinh Muta [] Kerlix   [] Foam [] Super Absorbant [] ABD     [] ACE Wrap            [] Other:    Avoid contact of tape with skin.     [x] Change dressing: [] Daily    [] Every Other Day [] Once per week   [x] Twice per week   [] Three times per week [] Do Not Change Dressing   [] Other:      Compression:  Apply: [x] Multilayer Compression Wrap: []Right Leg D Tubi []                                 []Profore   [] profore lite  (coflex lite) Left       []Unna     [x] Do not get leg(s) with wrap wet. If wraps become too tight call the center or completely remove the wrap. [x] Elevate leg(s) above the level of the heart when sitting. [x] Avoid prolonged standing in one place. Dietary:  [] Diet as tolerated: [x] Calorie Diabetic Diet: [x] No Added Salt:  [x] Increase Protein: [] Other:     Activity:  [x] Activity as tolerated:    [] Patient has no activity restrictions       [] Strict Bedrest:   [] Remain off Work:       [] May return to full duty work:                                     [] Return to work with restrictions:               [address] [facility] [address] Information: Should you experience any significant changes in your wound(s) or have questions about your wound care, please contact Thais Doyle 26 at [place] [phone] 8:00 am - 4:30. If you need help with your wound outside of these hours and cannot wait until we are again available, contact your PCP or go to the hospital emergency room. PLEASE NOTE: IF YOU ARE UNABLE TO OBTAIN WOUND SUPPLIES, CONTINUE TO USE THE SUPPLIES YOU HAVE AVAILABLE UNTIL YOU ARE ABLE TO REACH US. IT IS MOST IMPORTANT TO KEEP THE WOUND COVERED AT ALL TIMES.     [x] Dr. Kumar Cornejo   [] Dr. Camille Villar  [] Dr. Brenna Deng

## 2023-02-01 NOTE — DISCHARGE INSTRUCTIONS
Discharge Instructions  [address] [facility], [facility]  Telephone: [phone]    NAME:  Carol Jo OF BIRTH:  1955  MEDICAL RECORD NUMBER:  [id number]  DATE: 02/01/2022       Return Appointment:  [] Dressing supply provider:   [x] Home Healthcare: [address] [facility]  discharge  [] Return Appointment:  [facility])  [] Nurse Visit:       [x] Discharge from [facility]  [] Ordered tests:    [] Referrals:                    [] Rx:        Wound Cleansing:   Do not scrub or use excessive force. Cleanse wound prior to applying a clean dressing with:  [] Normal Saline   [] Mild Soap & Water/Baby Shampoo    [] Keep Wound Dry in Shower  [] Other:      Topical Treatments:  Do not apply lotions, creams, or ointments to wound bed unless directed. [x] Apply moisturizing lotion to skin surrounding the wound prior to dressing change.  [] Apply antifungal ointment to skin surrounding the wound prior to dressing change.  [] Apply thin film of moisture barrier ointment to skin immediately around wound. [] Other:  Betadine to israel-wound     Dressings:           Wound Location: Left leg (Right Leg - healed)  [] Apply Primary Dressing:       [] MediHoney Gel    [] MediHoney Alginate               [] Calcium Alginate      [] Calcium Alginate with silver- Silvercel   [] Collagen                   [] Collagen with Silver                [] Santyl with Moistened saline gauze              [] Hydrofera Blue (cut to size and moistened)  [] Hydrofera Blue Ready (Cut to size)   [] NS wet to dry    [] Betadine wet to dry              [] Hydrogel                [] Mepitel     [] Bactroban/Mupirocin               [] Other:     [] Cover and Secure with:     [] Gauze [] Jones Kadi [] Kerlix   [] Foam [] Super Absorbant [] ABD     [] ACE Wrap            [] Other:    Avoid contact of tape with skin.     [x] Change dressing: [] Daily    [] Every Other Day [] Once per week   [x] Twice per week   [] Three times per week [] Do Not Change Dressing   [] Other:      Compression:  Apply: [x] Multilayer Compression Wrap: []Right Leg D Tubi []                                 []Profore   [] profore lite  (coflex lite) Left       []Unna     [x] Do not get leg(s) with wrap wet. If wraps become too tight call the center or completely remove the wrap. [x] Elevate leg(s) above the level of the heart when sitting. [x] Avoid prolonged standing in one place. Dietary:  [] Diet as tolerated: [x] Calorie Diabetic Diet: [x] No Added Salt:  [x] Increase Protein: [] Other:     Activity:  [x] Activity as tolerated:    [] Patient has no activity restrictions       [] Strict Bedrest:   [] Remain off Work:       [] May return to full duty work:                                     [] Return to work with restrictions:               [address] [facility] [address] Information: Should you experience any significant changes in your wound(s) or have questions about your wound care, please contact Thais Doyle 26 at [place] [address] 8:00 am - 4:30. If you need help with your wound outside of these hours and cannot wait until we are again available, contact your PCP or go to the hospital emergency room. PLEASE NOTE: IF YOU ARE UNABLE TO OBTAIN WOUND SUPPLIES, CONTINUE TO USE THE SUPPLIES YOU HAVE AVAILABLE UNTIL YOU ARE ABLE TO REACH US. IT IS MOST IMPORTANT TO KEEP THE WOUND COVERED AT ALL TIMES.     [x] Dr. Helga Núñez   [] Dr. Cullen Carreno  [] Dr. Allison Wan

## 2023-02-01 NOTE — WOUND CARE
[address]   Progress Note and Procedure Note   NO Procedure      [address] RECORD NUMBER:  [id number]  AGE: 75 y.o. RACE WHITE/NON-  GENDER: male  : 1955  EPISODE DATE:  2023    Subjective:   No new problems  Chief Complaint   Patient presents with    Wound Check     L leg          HISTORY of PRESENT ILLNESS HPI    El Keita is a 75 y.o. male who presents today for wound/ulcer evaluation.    History of Wound Context: LLE  Wound/Ulcer Pain Timing/Severity: none  Quality of pain: dull  Severity:  0 / 10   Modifying Factors: None  Associated Signs/Symptoms: none    Ulcer Identification:  Ulcer Type: venous    Contributing Factors: edema    Wound: LLE         PAST MEDICAL HISTORY    Past Medical History:   Diagnosis Date    Arthritis     CAD (coronary artery disease)     Chronic obstructive pulmonary disease (HCC)     Diabetes (HCC)     Gout     Hypertension     Ill-defined condition     Sleep apnea         PAST SURGICAL HISTORY    Past Surgical History:   Procedure Laterality Date    HX ORTHOPAEDIC      HX PACEMAKER      HX VEIN ABLATION ADHESIVE         FAMILY HISTORY    Family History   Problem Relation Age of Onset    Heart Disease Mother     Kidney Disease Mother     Hypertension Mother     Diabetes Mother     Heart Disease Father     Hypertension Father     Diabetes Sister     Diabetes Brother        SOCIAL HISTORY    Social History     Tobacco Use    Smoking status: Never    Smokeless tobacco: Never   Vaping Use    Vaping Use: Never used   Substance Use Topics    Alcohol use: Not Currently    Drug use: Never       ALLERGIES    Allergies   Allergen Reactions    Elavil Angioedema    Naproxen Unknown (comments)     Pt not sure of reaction    Sulfa (Sulfonamide Antibiotics) Nausea and Vomiting       MEDICATIONS    Current Outpatient Medications on File Prior to Encounter   Medication Sig Dispense Refill    oxyCODONE IR (ROXICODONE) 10 mg tab immediate release tablet Take 10 mg by mouth every eight (8) hours as needed for Pain. insulin glargine (LANTUS) 100 unit/mL injection 27-unit subcu at bedtime 1 mL 2    insulin glargine,hum.rec.anlog (TOUJEO MAX U-300 SOLOSTAR SC) 80 Units by SubCUTAneous route two (2) times a day. 80 units in am, 34 units in pm      b complex vitamins tablet Take 1 Tablet by mouth daily. ammonium lactate (AMLACTIN) 12 % topical cream Apply  to affected area as needed for Other (xerosis). rub in to affected area well  Indications: dry skin 280 g 0    vitamin E, dl,tocopheryl acet, (vitamin E acetate) 400 unit capsule Take 2 Capsules by mouth two (2) times a day. 100 Capsule 0    ascorbic acid, vitamin C, (VITAMIN C) 500 mg tablet Take 1,000 mg by mouth two (2) times a day. Indications: inadequate vitamin C      tamsulosin (FLOMAX) 0.4 mg capsule Take 0.8 mg by mouth daily. tiotropium (SPIRIVA) 18 mcg inhalation capsule Take 1 Capsule by inhalation daily. bumetanide (BUMEX) 2 mg tablet Take 2 mg by mouth two (2) times a day. ferrous sulfate (Iron) 325 mg (65 mg iron) tablet Take  by mouth two (2) times daily (after meals). aspirin 81 mg chewable tablet Take 81 mg by mouth daily. flunisolide (NASAREL) 25 mcg (0.025 %) spry 2 Sprays two (2) times a day. albuterol (PROVENTIL HFA, VENTOLIN HFA, PROAIR HFA) 90 mcg/actuation inhaler Take 1 Puff by inhalation every four (4) hours as needed for Wheezing or Shortness of Breath. colchicine 0.6 mg tablet Take 0.6 mg by mouth daily. gabapentin (NEURONTIN) 300 mg capsule Take 300 mg by mouth four (4) times daily. insulin lispro (HUMALOG) 100 unit/mL injection by SubCUTAneous route. Sliding scale       No current facility-administered medications on file prior to encounter. REVIEW OF SYSTEMS    Pertinent items are noted in HPI.     Objective:     Visit Vitals  /83 (BP Patient Position: At rest;Sitting)   Pulse 87   Temp (!) 95.5 °F (35.3 °C)   Resp 20   SpO2 98%       Wt Readings from Last 3 Encounters:   07/25/22 157.4 kg (347 lb)   07/21/22 157.4 kg (347 lb)   07/11/22 157.4 kg (347 lb)       PHYSICAL EXAM  Wound closed  Pertinent items are noted in HPI. Assessment:    Healed  Problem List Items Addressed This Visit       Idiopathic chronic venous hypertension of left lower extremity with ulcer (HCC)    Relevant Medications    lidocaine (XYLOCAINE) 2 % viscous solution 15 mL    Other Relevant Orders    INITIATE OUTPATIENT WOUND CARE PROTOCOL    Localized edema    Relevant Medications    lidocaine (XYLOCAINE) 2 % viscous solution 15 mL    Other Relevant Orders    INITIATE OUTPATIENT WOUND CARE PROTOCOL    Non-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 (HCC)    Relevant Medications    lidocaine (XYLOCAINE) 2 % viscous solution 15 mL    Other Relevant Orders    INITIATE OUTPATIENT WOUND CARE PROTOCOL    Ulcer of left foot, with fat layer exposed (HCC)    Relevant Medications    lidocaine (XYLOCAINE) 2 % viscous solution 15 mL    Other Relevant Orders    INITIATE OUTPATIENT WOUND CARE PROTOCOL    Ulcer of right heel, with fat layer exposed (HCC)    Relevant Medications    lidocaine (XYLOCAINE) 2 % viscous solution 15 mL    Other Relevant Orders    INITIATE OUTPATIENT WOUND CARE PROTOCOL    Non-pressure chronic ulcer of other part of right lower leg with fat layer exposed (HCC) - Primary    Relevant Medications    lidocaine (XYLOCAINE) 2 % viscous solution 15 mL    Other Relevant Orders    INITIATE OUTPATIENT WOUND CARE PROTOCOL       Procedure Note  Indications:  Based on my examination of this patient's wound(s)/ulcer(s) today, debridement is not required to promote healing and evaluate the wound base. Plan:   Return as needed  Treatment Note please see attached Discharge Instructions    Written patient dismissal instructions given to patient or POA.          Electronically signed by Jules Tesfaye Franchesca Munoz MD on 2/1/2023 at 3:16 PM

## 2023-04-19 ENCOUNTER — APPOINTMENT (OUTPATIENT)
Dept: CT IMAGING | Age: 68
End: 2023-04-19
Attending: STUDENT IN AN ORGANIZED HEALTH CARE EDUCATION/TRAINING PROGRAM
Payer: MEDICARE

## 2023-04-19 ENCOUNTER — HOSPITAL ENCOUNTER (EMERGENCY)
Age: 68
Discharge: HOME | End: 2023-04-20
Attending: STUDENT IN AN ORGANIZED HEALTH CARE EDUCATION/TRAINING PROGRAM
Payer: MEDICARE

## 2023-04-19 ENCOUNTER — APPOINTMENT (OUTPATIENT)
Dept: GENERAL RADIOLOGY | Age: 68
End: 2023-04-19
Attending: STUDENT IN AN ORGANIZED HEALTH CARE EDUCATION/TRAINING PROGRAM
Payer: MEDICARE

## 2023-04-19 DIAGNOSIS — M10.9 ACUTE GOUT OF RIGHT WRIST, UNSPECIFIED CAUSE: Primary | ICD-10-CM

## 2023-04-19 DIAGNOSIS — S81.802A WOUND OF LEFT LOWER EXTREMITY, INITIAL ENCOUNTER: ICD-10-CM

## 2023-04-19 DIAGNOSIS — I87.8 VENOUS STASIS: ICD-10-CM

## 2023-04-19 DIAGNOSIS — M79.601 RIGHT ARM PAIN: ICD-10-CM

## 2023-04-19 DIAGNOSIS — J44.9 CHRONIC OBSTRUCTIVE PULMONARY DISEASE, UNSPECIFIED COPD TYPE (HCC): ICD-10-CM

## 2023-04-19 LAB
ALBUMIN SERPL-MCNC: 3.3 G/DL (ref 3.5–5)
ALBUMIN/GLOB SERPL: 0.8 (ref 1.1–2.2)
ALP SERPL-CCNC: 95 U/L (ref 45–117)
ALT SERPL-CCNC: 29 U/L (ref 12–78)
ANION GAP SERPL CALC-SCNC: 7 MMOL/L (ref 5–15)
AST SERPL W P-5'-P-CCNC: 17 U/L (ref 15–37)
BASOPHILS # BLD: 0 K/UL (ref 0–0.1)
BASOPHILS NFR BLD: 0 % (ref 0–1)
BILIRUB SERPL-MCNC: 0.3 MG/DL (ref 0.2–1)
BNP SERPL-MCNC: 644 PG/ML
BUN SERPL-MCNC: 29 MG/DL (ref 6–20)
BUN/CREAT SERPL: 25 (ref 12–20)
CA-I BLD-MCNC: 9.1 MG/DL (ref 8.5–10.1)
CHLORIDE SERPL-SCNC: 107 MMOL/L (ref 97–108)
CO2 SERPL-SCNC: 25 MMOL/L (ref 21–32)
CREAT SERPL-MCNC: 1.15 MG/DL (ref 0.7–1.3)
CRP SERPL-MCNC: 1.53 MG/DL (ref 0–0.6)
DIFFERENTIAL METHOD BLD: ABNORMAL
EOSINOPHIL # BLD: 0.2 K/UL (ref 0–0.4)
EOSINOPHIL NFR BLD: 2 % (ref 0–7)
ERYTHROCYTE [DISTWIDTH] IN BLOOD BY AUTOMATED COUNT: 13.2 % (ref 11.5–14.5)
ERYTHROCYTE [SEDIMENTATION RATE] IN BLOOD: 67 MM/HR (ref 0–20)
GLOBULIN SER CALC-MCNC: 4 G/DL (ref 2–4)
GLUCOSE SERPL-MCNC: 130 MG/DL (ref 65–100)
HCT VFR BLD AUTO: 35.8 % (ref 36.6–50.3)
HGB BLD-MCNC: 11.4 G/DL (ref 12.1–17)
IMM GRANULOCYTES # BLD AUTO: 0 K/UL (ref 0–0.04)
IMM GRANULOCYTES NFR BLD AUTO: 0 % (ref 0–0.5)
LACTATE BLD-SCNC: 0.66 MMOL/L (ref 0.4–2)
LACTATE SERPL-SCNC: 2.4 MMOL/L (ref 0.4–2)
LYMPHOCYTES # BLD: 1.9 K/UL (ref 0.8–3.5)
LYMPHOCYTES NFR BLD: 18 % (ref 12–49)
MCH RBC QN AUTO: 29.4 PG (ref 26–34)
MCHC RBC AUTO-ENTMCNC: 31.8 G/DL (ref 30–36.5)
MCV RBC AUTO: 92.3 FL (ref 80–99)
MONOCYTES # BLD: 0.8 K/UL (ref 0–1)
MONOCYTES NFR BLD: 8 % (ref 5–13)
NEUTS SEG # BLD: 7.5 K/UL (ref 1.8–8)
NEUTS SEG NFR BLD: 72 % (ref 32–75)
NRBC # BLD: 0 K/UL (ref 0–0.01)
NRBC BLD-RTO: 0 PER 100 WBC
PERFORMED BY, TECHID: NORMAL
PLATELET # BLD AUTO: 238 K/UL (ref 150–400)
PMV BLD AUTO: 11 FL (ref 8.9–12.9)
POTASSIUM SERPL-SCNC: 4.5 MMOL/L (ref 3.5–5.1)
PROT SERPL-MCNC: 7.3 G/DL (ref 6.4–8.2)
RBC # BLD AUTO: 3.88 M/UL (ref 4.1–5.7)
SODIUM SERPL-SCNC: 139 MMOL/L (ref 136–145)
TROPONIN I SERPL HS-MCNC: 18 NG/L (ref 0–76)
URATE SERPL-MCNC: 9.1 MG/DL (ref 3.5–7.2)
WBC # BLD AUTO: 10.4 K/UL (ref 4.1–11.1)

## 2023-04-19 PROCEDURE — 73700 CT LOWER EXTREMITY W/O DYE: CPT

## 2023-04-19 PROCEDURE — 86140 C-REACTIVE PROTEIN: CPT

## 2023-04-19 PROCEDURE — 93005 ELECTROCARDIOGRAM TRACING: CPT

## 2023-04-19 PROCEDURE — 83605 ASSAY OF LACTIC ACID: CPT

## 2023-04-19 PROCEDURE — 73110 X-RAY EXAM OF WRIST: CPT

## 2023-04-19 PROCEDURE — 71045 X-RAY EXAM CHEST 1 VIEW: CPT

## 2023-04-19 PROCEDURE — 83880 ASSAY OF NATRIURETIC PEPTIDE: CPT

## 2023-04-19 PROCEDURE — 87186 SC STD MICRODIL/AGAR DIL: CPT

## 2023-04-19 PROCEDURE — 87040 BLOOD CULTURE FOR BACTERIA: CPT

## 2023-04-19 PROCEDURE — 85025 COMPLETE CBC W/AUTO DIFF WBC: CPT

## 2023-04-19 PROCEDURE — 96375 TX/PRO/DX INJ NEW DRUG ADDON: CPT

## 2023-04-19 PROCEDURE — 84550 ASSAY OF BLOOD/URIC ACID: CPT

## 2023-04-19 PROCEDURE — 99285 EMERGENCY DEPT VISIT HI MDM: CPT

## 2023-04-19 PROCEDURE — 85652 RBC SED RATE AUTOMATED: CPT

## 2023-04-19 PROCEDURE — 36415 COLL VENOUS BLD VENIPUNCTURE: CPT

## 2023-04-19 PROCEDURE — 80053 COMPREHEN METABOLIC PANEL: CPT

## 2023-04-19 PROCEDURE — 87205 SMEAR GRAM STAIN: CPT

## 2023-04-19 PROCEDURE — 73200 CT UPPER EXTREMITY W/O DYE: CPT

## 2023-04-19 PROCEDURE — 87077 CULTURE AEROBIC IDENTIFY: CPT

## 2023-04-19 PROCEDURE — 74011250636 HC RX REV CODE- 250/636: Performed by: STUDENT IN AN ORGANIZED HEALTH CARE EDUCATION/TRAINING PROGRAM

## 2023-04-19 PROCEDURE — 84484 ASSAY OF TROPONIN QUANT: CPT

## 2023-04-19 PROCEDURE — 96374 THER/PROPH/DIAG INJ IV PUSH: CPT

## 2023-04-19 RX ORDER — KETOROLAC TROMETHAMINE 30 MG/ML
15 INJECTION, SOLUTION INTRAMUSCULAR; INTRAVENOUS
Status: COMPLETED | OUTPATIENT
Start: 2023-04-19 | End: 2023-04-19

## 2023-04-19 RX ORDER — MORPHINE SULFATE 4 MG/ML
4 INJECTION INTRAVENOUS ONCE
Status: COMPLETED | OUTPATIENT
Start: 2023-04-19 | End: 2023-04-19

## 2023-04-19 RX ADMIN — KETOROLAC TROMETHAMINE 15 MG: 30 INJECTION, SOLUTION INTRAMUSCULAR; INTRAVENOUS at 23:03

## 2023-04-19 RX ADMIN — MORPHINE SULFATE 4 MG: 4 INJECTION, SOLUTION INTRAMUSCULAR; INTRAVENOUS at 17:29

## 2023-04-19 RX ADMIN — SODIUM CHLORIDE 1000 ML: 9 INJECTION, SOLUTION INTRAVENOUS at 18:31

## 2023-04-19 NOTE — ED TRIAGE NOTES
States right wrist and hand pain, states shortness if breath, states wound on left leg that is draining and foul smell.

## 2023-04-19 NOTE — ED PROVIDER NOTES
[facility]  EMERGENCY DEPARTMENT ENCOUNTER NOTE        Date: 4/19/2023  Patient Name: Alberto Scott      History of Presenting Illness     Chief Complaint   Patient presents with    Hand Pain    Wrist Pain    Shortness of Breath    Wound Check       History Provided By: Patient    HPI: Alberto Scott, 75 y.o. male with PMH and medication as below comes to the ED with chief complaint of right hand and wrist pain and wanting his left lower extremity went to be checked. He chronically has shortness of breath and that has not changed from baseline. Denies any new cough, chest pain, or abdominal pain. He describes his shortness of breath as being chronic secondary to COPD. He has all of his medications and there are no worsening from baseline. No fever, chills or sweats. He noted that he has some drainage from his left lower extremity wound. He does have home health care in which his wound is attended to as an outpatient by nursing. Last change was today. Denies any significant pain from before. Patient main concerns today is his right arm. Is been experiencing pain this been going on for several weeks with some limitation of range of motion. Denies any trauma, changes changes in color, or worsening swelling. He noted that its been swollen for 2 weeks and constant with some limitation of function of that arm. PCP: Neal Aggarwal MD    Current Outpatient Medications   Medication Sig Dispense Refill    predniSONE (DELTASONE) 20 mg tablet Take 2 Tablets by mouth daily for 4 days. With Breakfast 8 Tablet 0    oxyCODONE IR (ROXICODONE) 10 mg tab immediate release tablet Take 10 mg by mouth every eight (8) hours as needed for Pain. insulin glargine (LANTUS) 100 unit/mL injection 27-unit subcu at bedtime 1 mL 2    insulin glargine,hum.rec.anlog (TOUJEO MAX U-300 SOLOSTAR SC) 80 Units by SubCUTAneous route two (2) times a day.  80 units in am, 34 units in pm b complex vitamins tablet Take 1 Tablet by mouth daily. ammonium lactate (AMLACTIN) 12 % topical cream Apply  to affected area as needed for Other (xerosis). rub in to affected area well  Indications: dry skin 280 g 0    vitamin E, dl,tocopheryl acet, (vitamin E acetate) 400 unit capsule Take 2 Capsules by mouth two (2) times a day. 100 Capsule 0    ascorbic acid, vitamin C, (VITAMIN C) 500 mg tablet Take 1,000 mg by mouth two (2) times a day. Indications: inadequate vitamin C      tamsulosin (FLOMAX) 0.4 mg capsule Take 0.8 mg by mouth daily. tiotropium (SPIRIVA) 18 mcg inhalation capsule Take 1 Capsule by inhalation daily. bumetanide (BUMEX) 2 mg tablet Take 2 mg by mouth two (2) times a day. ferrous sulfate (Iron) 325 mg (65 mg iron) tablet Take  by mouth two (2) times daily (after meals). aspirin 81 mg chewable tablet Take 81 mg by mouth daily. flunisolide (NASAREL) 25 mcg (0.025 %) spry 2 Sprays two (2) times a day. albuterol (PROVENTIL HFA, VENTOLIN HFA, PROAIR HFA) 90 mcg/actuation inhaler Take 1 Puff by inhalation every four (4) hours as needed for Wheezing or Shortness of Breath. colchicine 0.6 mg tablet Take 0.6 mg by mouth daily. gabapentin (NEURONTIN) 300 mg capsule Take 300 mg by mouth four (4) times daily. insulin lispro (HUMALOG) 100 unit/mL injection by SubCUTAneous route.  Sliding scale         Past History     Past Medical History:  Past Medical History:   Diagnosis Date    Arthritis     CAD (coronary artery disease)     Chronic obstructive pulmonary disease ([facility] [address]     Diabetes ([facility] [address]     Gout     Hypertension     Ill-defined condition     Sleep apnea        Past Surgical History:  Past Surgical History:   Procedure Laterality Date    HX ORTHOPAEDIC      HX PACEMAKER      HX VEIN ABLATION ADHESIVE         Family History:  Family History   Problem Relation Age of Onset    Heart Disease Mother     Kidney Disease Mother     Hypertension Mother Diabetes Mother     Heart Disease Father     Hypertension Father     Diabetes Sister     Diabetes Brother        Social History:  Social History     Tobacco Use    Smoking status: Never    Smokeless tobacco: Never   Vaping Use    Vaping Use: Never used   Substance Use Topics    Alcohol use: Not Currently    Drug use: Never       Allergies: Allergies   Allergen Reactions    Elavil Angioedema    Naproxen Unknown (comments)     Pt not sure of reaction    Sulfa (Sulfonamide Antibiotics) Nausea and Vomiting         Review of Systems     Review of Systems    A 10 point review of system was performed and was negative except as noted above in HPI    Physical Exam     Physical Exam  Vitals and nursing note reviewed. Constitutional:       General: He is not in acute distress. Appearance: He is obese. He is not toxic-appearing or diaphoretic. HENT:      Head: Normocephalic and atraumatic. Cardiovascular:      Rate and Rhythm: Normal rate and regular rhythm. Heart sounds: Normal heart sounds. Pulmonary:      Effort: Pulmonary effort is normal.      Breath sounds: Normal breath sounds. Abdominal:      Palpations: Abdomen is soft. Tenderness: There is no abdominal tenderness. Musculoskeletal:      Cervical back: Normal range of motion and neck supple. Right lower leg: No tenderness. Edema present. Left lower leg: No tenderness. Edema present. Comments: Right upper extremity exam:  Tenderness to palpation with swelling of the forearm distally all the way to the wrist.  Limited range of motion of the wrist.  No erythema or effusion appreciated. Elbow joint is within normal.  Hand is within normal.  Shoulder exam is unremarkable. Left upper extremity exam is unremarkable. Chronic wound of the left lower extremity with granulation tissue. No tenderness to palpation. No purulent drainage appreciated. Skin:     General: Skin is warm and dry.    Neurological:      Mental Status: He is alert and oriented to person, place, and time. Lab and Diagnostic Study Results     Labs -   No results found for this or any previous visit (from the past 12 hour(s)). Radiologic Studies -   [unfilled]  CT Results  (Last 48 hours)      None          CXR Results  (Last 48 hours)      None            Medical Decision Making and ED Course   - I am the first and primary provider for this patient AND AM THE PRIMARY PROVIDER OF RECORD. - I reviewed the vital signs, available nursing notes, past medical history, past surgical history, family history and social history. - Initial assessment performed. The patients presenting problems have been discussed, and the staff are in agreement with the care plan formulated and outlined with them. I have encouraged them to ask questions as they arise throughout their visit. Vital Signs-Reviewed the patient's vital signs. No data found. Records Reviewed: Prior medical records and Nursing notes      Medical Decision Making     70-year-old male comes to the ED for multiple complaints. His past medical history is significant for DM, HTN, CAD, COPD, gout, arthritis, obesity, and obstructive sleep apnea. -Shortness of breath:  Patient reports history of shortness of breath. He mentioned to triage that has been having shortness of breath. However, [employer] reports that he chronically has shortness of breath and that has not changed from before. He does not have any significant wheezing on my assessment. He is at her baseline home O2. No lower extremity swelling worse than usual.  There is no rales on exam.  However, giving his symptoms we will obtain labs to rule out other causes. - Lower extremity wound:  Patient has chronic venous stasis and has a chronic wound on the left lower extremity. I did not appreciate any significant drainage. However, patient noted at home that he has some yellowish discharge and the concern for infection.   Thus will get CT to rule out deep tissue infection.  - Right upper extremity pain. Patient reports for the last 2 weeks been having swelling and pain of the right upper extremity. He had limitation of range of motion of the wrist.  This could be secondary to inflammatory arthritis such as gout. Highly unlikely to be secondary to septic joint. He is denying any constitutional symptoms. There is no significant pain out of proportion to exam.  Also, I did not appreciate any effusion on my assessment. Plan is to obtain x-ray of his hand and will get ESR and CRP and will get an orthopedic consult. ED Course & Reassessment     Medications ordered:  Medications   morphine injection 4 mg (4 mg IntraVENous Given 4/19/23 1729)   sodium chloride 0.9 % bolus infusion 1,000 mL (0 mL IntraVENous IV Completed 4/19/23 2121)   ketorolac (TORADOL) injection 15 mg (15 mg IntraVENous Given 4/19/23 2303)   predniSONE (DELTASONE) tablet 60 mg (60 mg Oral Given 4/20/23 0021)       ED Course:     I independently reviewed with the patient EKG. EKG showed sinus tachycardia rate 108, intervals showed mildly prolonged QTc but otherwise no significant derangement. Normal axis, no specific ST elevation or depression meeting criteria, no signs of dysrhythmia or blocks. Baseline motion artifact noted. I independently reviewed interpreted patient work-up. CBC is unremarkable for leukocytosis or anemia. Chemistry is showing mildly elevated glucose but otherwise no significant derangement. proBNP is negative. Troponin is negative. Lactate is elevated which is likely secondary to dehydration. Patient vital signs are not consistent with sepsis. ESR and CRP are elevated. X-ray of his hand is unremarkable for acute finding. Discussed with orthopedics. Recommended obtaining CT of the upper extremity. Will obtain CT to rule out the possibility of deep tissue infection.   Chest x-ray was also interpreted by radiologist and CT of the lower extremity was interpreted by the cellist as negative. From a shortness of breath point of view, patient is clear as he does not have any evidence of pneumonia, pulmonary edema and is not requiring any additional supplementation of oxygen nor breathing treatment in the ED. Thus, will defer to his primary care doctor for reevaluation and further work-up as needed. From a left lower extremity perspective, patient imaging are not consistent with deep tissue infection. Clearly, his symptoms are secondary to chronic wound without acute infection. We will continue wound care as an outpatient and different follow-up with his primary care doctor for reevaluation. Based on his assessment today, there is no indication for antibiotics. From a right upper extremity perspective, ESR and CRP are elevated. However, patient presentation is not consistent with septic joint. Pending CT results for disposition. Diagnosis     Clinical Impression:   1. Acute gout of right wrist, unspecified cause    2. Right arm pain    3. Wound of left lower extremity, initial encounter    4. Chronic obstructive pulmonary disease, unspecified COPD type ([facility] [address]    5. Venous stasis          Disposition     Disposition: Condition improved  DC- Adult Discharges: All of the diagnostic tests were reviewed and questions answered. Diagnosis, care plan and treatment options were discussed. The patient understands the instructions and will follow up as directed. The patients results have been reviewed with them. They have been counseled regarding their diagnosis. The patient verbally convey understanding and agreement of the signs, symptoms, diagnosis, treatment and prognosis and additionally agrees to follow up as recommended with their PCP in 24 - 48 hours. They also agree with the care-plan and convey that all of their questions have been answered.   I have also put together some discharge instructions for them that include: 1) educational information regarding their diagnosis, 2) how to care for their diagnosis at home, as well a 3) list of reasons why they would want to return to the ED prior to their follow-up appointment, should their condition change. Discharged      DISCHARGE PLAN:  1. Follow-up Information       Follow up With Specialties Details Why [facility] EMERGENCY DEPT Emergency Medicine Go to  As needed, If symptoms worsen [address] [facility] Terry Mendoza MD Family Medicine Schedule an appointment as soon as possible for a visit in 3 days For reevaluation, Discuss your visit to the ER [address] [facility]  [facility] [address] [phone]            2. Return to ED if worse   3. Discharge Medication List as of 4/20/2023  1:17 AM        START taking these medications    Details   predniSONE (DELTASONE) 20 mg tablet Take 2 Tablets by mouth daily for 4 days. With Breakfast, Normal, Disp-8 Tablet, R-0           CONTINUE these medications which have NOT CHANGED    Details   oxyCODONE IR (ROXICODONE) 10 mg tab immediate release tablet Take 10 mg by mouth every eight (8) hours as needed for Pain., Historical Med      insulin glargine (LANTUS) 100 unit/mL injection 27-unit subcu at bedtime, Normal, Disp-1 mL, R-2      insulin glargine,hum.rec.anlog (TOUJEO MAX U-300 SOLOSTAR SC) 80 Units by SubCUTAneous route two (2) times a day. 80 units in am, 34 units in pm, Historical Med      b complex vitamins tablet Take 1 Tablet by mouth daily. , Historical Med      ammonium lactate (AMLACTIN) 12 % topical cream Apply  to affected area as needed for Other (xerosis).  rub in to affected area well  Indications: dry skin, Normal, Disp-280 g, R-0      vitamin E, dl,tocopheryl acet, (vitamin E acetate) 400 unit capsule Take 2 Capsules by mouth two (2) times a day., Normal, Disp-100 Capsule, R-0      ascorbic acid, vitamin C, (VITAMIN C) 500 mg tablet Take 1,000 mg by mouth two (2) times a day. Indications: inadequate vitamin C, Historical Med      tamsulosin (FLOMAX) 0.4 mg capsule Take 0.8 mg by mouth daily. , Historical Med      tiotropium (SPIRIVA) 18 mcg inhalation capsule Take 1 Capsule by inhalation daily. , Historical Med      bumetanide (BUMEX) 2 mg tablet Take 2 mg by mouth two (2) times a day., Historical Med      ferrous sulfate (Iron) 325 mg (65 mg iron) tablet Take  by mouth two (2) times daily (after meals). , Historical Med      aspirin 81 mg chewable tablet Take 81 mg by mouth daily. , Historical Med      flunisolide (NASAREL) 25 mcg (0.025 %) spry 2 Sprays two (2) times a day., Historical Med      albuterol (PROVENTIL HFA, VENTOLIN HFA, PROAIR HFA) 90 mcg/actuation inhaler Take 1 Puff by inhalation every four (4) hours as needed for Wheezing or Shortness of Breath., Historical Med      colchicine 0.6 mg tablet Take 0.6 mg by mouth daily. , Historical Med      gabapentin (NEURONTIN) 300 mg capsule Take 300 mg by mouth four (4) times daily. , Historical Med      insulin lispro (HUMALOG) 100 unit/mL injection by SubCUTAneous route. Sliding scale, Historical Med               Attestations: Juany Pandey MD    Please note that this dictation was completed with [employer], the [email or web address] voice recognition software. Quite often unanticipated grammatical, syntax, homophones, and other interpretive errors are inadvertently transcribed by the computer software. Please disregard these errors. Please excuse any errors that have escaped final proofreading. Thank you.

## 2023-04-19 NOTE — ED NOTES
Assumed care of pt from [facility], [address] [facility]. PIV placed in L antecubital space with US guidance, labs redrawn and sent. Pt tolerated well. Medicated with morphine per MAR, monitor leads replaced by EDT. Pt brought cup of ice water to sip, positioned for comfort.

## 2023-04-19 NOTE — ED NOTES
Assumed care of patient and received report from [address] Pt resting comfortably with call bell in reach.

## 2023-04-20 VITALS
BODY MASS INDEX: 44.1 KG/M2 | OXYGEN SATURATION: 99 % | HEIGHT: 71 IN | DIASTOLIC BLOOD PRESSURE: 64 MMHG | RESPIRATION RATE: 15 BRPM | HEART RATE: 77 BPM | TEMPERATURE: 98 F | SYSTOLIC BLOOD PRESSURE: 144 MMHG | WEIGHT: 315 LBS

## 2023-04-20 LAB
ATRIAL RATE: 108 BPM
CALCULATED P AXIS, ECG09: 75 DEGREES
CALCULATED R AXIS, ECG10: 128 DEGREES
CALCULATED T AXIS, ECG11: -21 DEGREES
DIAGNOSIS, 93000: NORMAL
P-R INTERVAL, ECG05: 174 MS
Q-T INTERVAL, ECG07: 362 MS
QRS DURATION, ECG06: 112 MS
QTC CALCULATION (BEZET), ECG08: 485 MS
TROPONIN I SERPL HS-MCNC: 24 NG/L (ref 0–76)
TROPONIN I SERPL HS-MCNC: 24 NG/L (ref 0–76)
VENTRICULAR RATE, ECG03: 108 BPM

## 2023-04-20 PROCEDURE — 84484 ASSAY OF TROPONIN QUANT: CPT

## 2023-04-20 PROCEDURE — 74011636637 HC RX REV CODE- 636/637: Performed by: STUDENT IN AN ORGANIZED HEALTH CARE EDUCATION/TRAINING PROGRAM

## 2023-04-20 PROCEDURE — 36415 COLL VENOUS BLD VENIPUNCTURE: CPT

## 2023-04-20 PROCEDURE — A9270 NON-COVERED ITEM OR SERVICE: HCPCS | Performed by: STUDENT IN AN ORGANIZED HEALTH CARE EDUCATION/TRAINING PROGRAM

## 2023-04-20 RX ORDER — PREDNISONE 20 MG/1
60 TABLET ORAL ONCE
Status: COMPLETED | OUTPATIENT
Start: 2023-04-20 | End: 2023-04-20

## 2023-04-20 RX ORDER — PREDNISONE 20 MG/1
40 TABLET ORAL DAILY
Qty: 8 TABLET | Refills: 0 | Status: SHIPPED | OUTPATIENT
Start: 2023-04-21 | End: 2023-04-25

## 2023-04-20 RX ADMIN — PREDNISONE 60 MG: 20 TABLET ORAL at 00:21

## 2023-04-20 NOTE — ED NOTES
The patient has improvement of his pain. He still has some mild swelling and pain over the right wrist.  He reports that this is similar to previous episodes of gout. He has had gout in the same wrist in the past.  He usually gets prednisone 5-day prescription for this because he has a history of CKD and cannot take NSAIDs anymore. He denies any fevers or chills. His CTs are negative. Work-up has been unremarkable. The patient will be put in a brace, given prednisone, and discharge with return precautions. Second troponin with negative delta change. ACS ruled out per the high-sensitivity troponin algorithm. Understanding was insured that at this time there is no evidence for a more malignant underlying process, but that early in the process of an illness, an emergency department workup can be falsely reassuring. Routine discharge counseling was given including the fact that any worsening, changing or persistent symptoms should prompt an immediate call or follow up with their primary physician or the emergency department. The importance of appropriate follow up was also discussed. More extensive discharge instructions were given in the patient's discharge paperwork. After completion of evaluation and discussion of results and diagnoses, all the questions were answered. If required, all follow up appointments and treatments were discussed and explained. Understanding was insured prior to discharge.

## 2023-04-20 NOTE — DISCHARGE INSTRUCTIONS
Thank you! Thank you for allowing me to care for you in the emergency department. I sincerely hope that you are satisfied with your visit today. It is my goal to provide you with excellent care. Below you will find a list of your labs and imaging from your visit today if applicable. Should you have any questions regarding these results please do not hesitate to call the emergency department. Please review [facility] for a more detailed result list since the below list may not be comprehensive. Instructions on how to sign up to [employer] should be provided in this packet. Labs -     Recent Results (from the past 12 hour(s))   METABOLIC PANEL, COMPREHENSIVE    Collection Time: 04/19/23  4:41 PM   Result Value Ref Range    Sodium 139 136 - 145 mmol/L    Potassium 4.5 3.5 - 5.1 mmol/L    Chloride 107 97 - 108 mmol/L    CO2 25 21 - 32 mmol/L    Anion gap 7 5 - 15 mmol/L    Glucose 130 (H) 65 - 100 mg/dL    BUN 29 (H) 6 - 20 mg/dL    Creatinine 1.15 0.70 - 1.30 mg/dL    BUN/Creatinine ratio 25 (H) 12 - 20      eGFR >60 >60 ml/min/1.73m2    Calcium 9.1 8.5 - 10.1 mg/dL    Bilirubin, total 0.3 0.2 - 1.0 mg/dL    AST (SGOT) 17 15 - 37 U/L    ALT (SGPT) 29 12 - 78 U/L    Alk.  phosphatase 95 45 - 117 U/L    Protein, total 7.3 6.4 - 8.2 g/dL    Albumin 3.3 (L) 3.5 - 5.0 g/dL    Globulin 4.0 2.0 - 4.0 g/dL    A-G Ratio 0.8 (L) 1.1 - 2.2     NT-PRO BNP    Collection Time: 04/19/23  4:41 PM   Result Value Ref Range    NT pro- (H) <125 pg/mL   LACTIC ACID    Collection Time: 04/19/23  4:41 PM   Result Value Ref Range    Lactic acid 2.4 (HH) 0.4 - 2.0 mmol/L   TROPONIN-HIGH SENSITIVITY    Collection Time: 04/19/23  4:41 PM   Result Value Ref Range    Troponin-High Sensitivity 18 0 - 76 ng/L   C REACTIVE PROTEIN, QT    Collection Time: 04/19/23  4:43 PM   Result Value Ref Range    C-Reactive protein 1.53 (H) 0.00 - 0.60 mg/dL   CBC WITH AUTOMATED DIFF    Collection Time: 04/19/23  4:43 PM   Result Value Ref Range    WBC 10.4 4.1 - 11.1 K/uL    RBC 3.88 (L) 4.10 - 5.70 M/uL    HGB 11.4 (L) 12.1 - 17.0 g/dL    HCT 35.8 (L) 36.6 - 50.3 %    MCV 92.3 80.0 - 99.0 FL    MCH 29.4 26.0 - 34.0 PG    MCHC 31.8 30.0 - 36.5 g/dL    RDW 13.2 11.5 - 14.5 %    PLATELET 882 882 - 644 K/uL    MPV 11.0 8.9 - 12.9 FL    NRBC 0.0 0.0  WBC    ABSOLUTE NRBC 0.00 0.00 - 0.01 K/uL    NEUTROPHILS 72 32 - 75 %    LYMPHOCYTES 18 12 - 49 %    MONOCYTES 8 5 - 13 %    EOSINOPHILS 2 0 - 7 %    BASOPHILS 0 0 - 1 %    IMMATURE GRANULOCYTES 0 0 - 0.5 %    ABS. NEUTROPHILS 7.5 1.8 - 8.0 K/UL    ABS. LYMPHOCYTES 1.9 0.8 - 3.5 K/UL    ABS. MONOCYTES 0.8 0.0 - 1.0 K/UL    ABS. EOSINOPHILS 0.2 0.0 - 0.4 K/UL    ABS. BASOPHILS 0.0 0.0 - 0.1 K/UL    ABS. IMM. GRANS. 0.0 0.00 - 0.04 K/UL    DF AUTOMATED     SED RATE (ESR)    Collection Time: 04/19/23  4:43 PM   Result Value Ref Range    Sed rate, automated 67 (H) 0 - 20 mm/hr   POC LACTIC ACID    Collection Time: 04/19/23  9:34 PM   Result Value Ref Range    Lactic Acid (POC) 0.66 0.40 - 2.00 mmol/L    Performed by XetawaveMAEVE TESSIE    TROPONIN-HIGH SENSITIVITY    Collection Time: 04/19/23 11:27 PM   Result Value Ref Range    Troponin-High Sensitivity 24 0 - 76 ng/L   URIC ACID    Collection Time: 04/19/23 11:27 PM   Result Value Ref Range    Uric acid 9.1 (H) 3.5 - 7.2 mg/dL       Radiologic Studies -   CT UP EXT RT WO CONT   Final Result   Limited study. There is no evidence of acute fracture of the RIGHT radius or   ulna. No soft tissue gas or fluid collection is identified. CT LOW EXT LT WO CONT         XR CHEST PORT   Final Result   Mild perihilar interstitial prominence, stable. Correlate for mild interstitial   edema. .  . XR WRIST RT AP/LAT/OBL MIN 3V   Final Result   No acute bony abnormality. CT Results  (Last 48 hours)                 04/19/23 2231  CT UP EXT RT WO CONT Final result    Impression:  Limited study. There is no evidence of acute fracture of the RIGHT radius or   ulna.  No soft tissue gas or fluid collection is identified. Narrative:  EXAM: CT UP EXT RT WO CONT       INDICATION: Hand pain, wrist pain        COMPARISON: RIGHT hand radiographs April 19, 2023       TECHNIQUE: Helical CT of the RIGHT wrist and forearm with coronal and sagittal   reformats. Images reviewed in soft tissue and bone windows. CT dose reduction   was achieved through the use of a standardized protocol tailored for this   examination and automatic exposure control for dose modulation. CONTRAST: None. FINDINGS:    The examination is technically limited for assessment of the carpal bones due to   the large field of view. There is no evidence of an acute fracture of the radius   or ulna. No focal periosteal reaction or cortical destruction is identified. The   RIGHT elbow appears aligned but is incompletely imaged. No definite fluid   collection of the RIGHT forearm is identified. No soft tissue gas is   appreciated. 04/19/23 1701  CT LOW EXT LT WO CONT Preliminary result      This result has not been signed. Information might be incomplete. Narrative:  *PRELIMINARY REPORT*   No discrete fluid collections visible. No definite acute cortical disruption. There are degenerative changes in the foot and in the knee        Preliminary report was provided by Dr. Brown Pu   Final report to follow. *END PRELIMINARY REPORT*                                 CXR Results  (Last 48 hours)                 04/19/23 1650  XR CHEST PORT Final result    Impression:  Mild perihilar interstitial prominence, stable. Correlate for mild interstitial   edema. .  . Narrative:  INDICATION:  sob        EXAM: Chest single view. COMPARISON: 17 7/21/2022. FINDINGS: A single frontal view of the chest at 1647 hours shows stable mild   perihilar interstitial prominence of lung markings. No new focal infiltrate or   effusion. The heart, mediastinum and pulmonary vasculature are stable.  AICD from   the left appears stable. .  The bony thorax is unremarkable for age. .                      If you feel that you have not received excellent quality care or timely care, please ask to speak to the nurse manager. Please choose us in the future for your continued health care needs. ------------------------------------------------------------------------------------------------------------  The exam and treatment you received in the Emergency Department were for an urgent problem and are not intended as complete care. It is important that you follow-up with a doctor, nurse practitioner, or physician assistant to:  (1) confirm your diagnosis,  (2) re-evaluation of changes in your illness and treatment, and  (3) for ongoing care. If your symptoms become worse or you do not improve as expected and you are unable to reach your usual health care provider, you should return to the Emergency Department. We are available 24 hours a day. Please take your discharge instructions with you when you go to your follow-up appointment. If a prescription has been provided, please have it filled as soon as possible to prevent a delay in treatment. Read the entire medication instruction sheet provided to you by the pharmacy. If you have any questions or reservations about taking the medication due to side effects or interactions with other medications, please call your primary care physician or contact the ER to speak with the charge nurse. Make an appointment with your family doctor or the physician you were referred to for follow-up of this visit as instructed on your discharge paperwork, as this is a mandatory follow-up. Return to the ER if you are unable to be seen or if you are unable to be seen in a timely manner. If you have any problem arranging the follow-up visit, contact the Emergency Department immediately.

## 2023-04-22 RX ORDER — CIPROFLOXACIN 500 MG/1
500 TABLET ORAL 2 TIMES DAILY
Qty: 20 TABLET | Refills: 0 | Status: SHIPPED | OUTPATIENT
Start: 2023-04-22 | End: 2023-05-02

## 2023-04-22 NOTE — PROGRESS NOTES
Patient discharged from the ER. No antibiotics on discharge. Given Pseudomonas growing, will prescribe ciprofloxacin to pharmacy.

## 2023-04-23 LAB
BACTERIA SPEC CULT: ABNORMAL
BACTERIA SPEC CULT: NORMAL
GRAM STN SPEC: ABNORMAL
GRAM STN SPEC: ABNORMAL
SPECIAL REQUESTS,SREQ: ABNORMAL
SPECIAL REQUESTS,SREQ: NORMAL

## 2023-04-26 LAB
BACTERIA SPEC CULT: NORMAL
SPECIAL REQUESTS,SREQ: NORMAL

## 2023-05-03 ENCOUNTER — HOSPITAL ENCOUNTER (OUTPATIENT)
Dept: WOUND CARE | Age: 68
Discharge: HOME | End: 2023-05-03
Payer: MEDICARE

## 2023-05-03 VITALS
TEMPERATURE: 97.6 F | HEART RATE: 109 BPM | RESPIRATION RATE: 22 BRPM | SYSTOLIC BLOOD PRESSURE: 133 MMHG | HEIGHT: 71 IN | WEIGHT: 315 LBS | BODY MASS INDEX: 44.1 KG/M2 | DIASTOLIC BLOOD PRESSURE: 71 MMHG

## 2023-05-03 PROCEDURE — 11042 DBRDMT SUBQ TIS 1ST 20SQCM/<: CPT

## 2023-05-03 PROCEDURE — 99214 OFFICE O/P EST MOD 30 MIN: CPT

## 2023-05-03 PROCEDURE — 11045 DBRDMT SUBQ TISS EACH ADDL: CPT

## 2023-05-03 PROCEDURE — 74011000250 HC RX REV CODE- 250: Performed by: SPECIALIST

## 2023-05-03 RX ORDER — LIDOCAINE HYDROCHLORIDE 20 MG/ML
15 SOLUTION OROPHARYNGEAL AS NEEDED
Status: DISCONTINUED | OUTPATIENT
Start: 2023-05-03 | End: 2023-05-05 | Stop reason: HOSPADM

## 2023-05-03 RX ORDER — PREDNISONE 10 MG/1
TABLET ORAL
COMMUNITY

## 2023-05-10 ENCOUNTER — HOSPITAL ENCOUNTER (OUTPATIENT)
Facility: HOSPITAL | Age: 68
Discharge: HOME | End: 2023-05-10
Payer: MEDICARE

## 2023-05-10 VITALS
OXYGEN SATURATION: 98 % | SYSTOLIC BLOOD PRESSURE: 147 MMHG | RESPIRATION RATE: 20 BRPM | DIASTOLIC BLOOD PRESSURE: 89 MMHG | TEMPERATURE: 98.5 F | HEART RATE: 99 BPM

## 2023-05-10 PROCEDURE — 11042 DBRDMT SUBQ TIS 1ST 20SQCM/<: CPT

## 2023-05-10 PROCEDURE — 11045 DBRDMT SUBQ TISS EACH ADDL: CPT

## 2023-05-10 RX ORDER — COLCHICINE 0.6 MG/1
0.6 TABLET ORAL DAILY
COMMUNITY
Start: 2023-04-06

## 2023-05-10 RX ORDER — NYSTATIN 100000 U/G
CREAM TOPICAL
COMMUNITY
Start: 2022-03-28

## 2023-05-10 RX ORDER — B-COMPLEX WITH VITAMIN C
1 TABLET ORAL DAILY
COMMUNITY

## 2023-05-10 RX ORDER — VITAMIN E 268 MG
800 CAPSULE ORAL 2 TIMES DAILY
COMMUNITY
Start: 2021-06-08

## 2023-05-10 RX ORDER — ASPIRIN 81 MG/1
81 TABLET, CHEWABLE ORAL DAILY
COMMUNITY

## 2023-05-10 RX ORDER — INSULIN GLARGINE 300 U/ML
INJECTION, SOLUTION SUBCUTANEOUS
COMMUNITY
Start: 2023-04-07

## 2023-05-10 RX ORDER — FLUTICASONE FUROATE, UMECLIDINIUM BROMIDE AND VILANTEROL TRIFENATATE 100; 62.5; 25 UG/1; UG/1; UG/1
POWDER RESPIRATORY (INHALATION)
COMMUNITY
Start: 2022-01-04

## 2023-05-10 RX ORDER — ALBUTEROL SULFATE 90 UG/1
1 AEROSOL, METERED RESPIRATORY (INHALATION) EVERY 4 HOURS PRN
COMMUNITY
Start: 2022-09-14

## 2023-05-10 RX ORDER — FERROUS SULFATE 325(65) MG
TABLET ORAL
COMMUNITY

## 2023-05-10 RX ORDER — METOLAZONE 2.5 MG/1
2.5 TABLET ORAL EVERY OTHER DAY
COMMUNITY
Start: 2023-03-30

## 2023-05-10 RX ORDER — MOMETASONE FUROATE 50 UG/1
SPRAY, METERED NASAL
COMMUNITY
Start: 2020-04-30

## 2023-05-10 RX ORDER — BUMETANIDE 2 MG/1
2 TABLET ORAL 2 TIMES DAILY
COMMUNITY
Start: 2023-04-22

## 2023-05-10 RX ORDER — PEN NEEDLE, DIABETIC 31 GX5/16"
NEEDLE, DISPOSABLE MISCELLANEOUS
COMMUNITY
Start: 2023-02-27

## 2023-05-10 RX ORDER — TIZANIDINE 4 MG/1
4 TABLET ORAL 3 TIMES DAILY PRN
COMMUNITY
Start: 2023-04-26

## 2023-05-10 RX ORDER — PREDNISONE 10 MG/1
10 TABLET ORAL DAILY
COMMUNITY
Start: 2023-03-23

## 2023-05-10 RX ORDER — NALOXONE HYDROCHLORIDE 4 MG/.1ML
SPRAY NASAL
COMMUNITY
Start: 2022-09-08

## 2023-05-10 RX ORDER — LIDOCAINE HYDROCHLORIDE 20 MG/ML
15 SOLUTION OROPHARYNGEAL AS NEEDED
Status: DISCONTINUED | OUTPATIENT
Start: 2023-05-10 | End: 2023-05-11 | Stop reason: HOSPADM

## 2023-05-10 RX ORDER — ATORVASTATIN CALCIUM 40 MG/1
40 TABLET, FILM COATED ORAL NIGHTLY
COMMUNITY
Start: 2023-04-24

## 2023-05-10 RX ORDER — FLUNISOLIDE 0.25 MG/ML
2 SOLUTION NASAL 2 TIMES DAILY
COMMUNITY
Start: 2022-09-09

## 2023-05-10 RX ORDER — INSULIN LISPRO 100 [IU]/ML
INJECTION, SOLUTION INTRAVENOUS; SUBCUTANEOUS
COMMUNITY
Start: 2023-04-06

## 2023-05-10 RX ORDER — AMMONIUM LACTATE 12 G/100G
CREAM TOPICAL PRN
COMMUNITY
Start: 2021-06-08

## 2023-05-10 RX ORDER — ASCORBIC ACID 500 MG
1000 TABLET ORAL 2 TIMES DAILY
COMMUNITY

## 2023-05-10 RX ORDER — OXYCODONE HYDROCHLORIDE 5 MG/1
5 TABLET ORAL 2 TIMES DAILY
COMMUNITY
Start: 2023-04-25

## 2023-05-10 RX ORDER — LORATADINE 10 MG/1
10 TABLET ORAL DAILY
COMMUNITY
Start: 2021-11-01

## 2023-05-10 RX ORDER — GABAPENTIN 300 MG/1
300 CAPSULE ORAL 4 TIMES DAILY
COMMUNITY
Start: 2023-04-25

## 2023-05-10 RX ORDER — BLOOD SUGAR DIAGNOSTIC
STRIP MISCELLANEOUS
COMMUNITY
Start: 2022-03-24

## 2023-05-10 ASSESSMENT — PAIN SCALES - GENERAL: PAINLEVEL_OUTOF10: 9

## 2023-05-10 NOTE — WOUND CARE
Multilayer Compression Wrap   (Not Unna) Below the Knee    NAME:  Nish Purvis OF BIRTH:  1955  MEDICAL RECORD NUMBER:  [id number]  DATE:  5/10/2023    Multilayer compression wrap: Removed old Multilayer wrap if indicated and wash leg with mild soap/water. Applied primary and secondary dressing as ordered. Applied multilayered dressing below the knee to left lower leg. Instructed patient/caregiver not to remove dressing and to keep it clean and dry. Instructed patient/caregiver on complications to report to provider, such as pain, numbness in toes, heavy drainage, and slippage of dressing. Instructed patient on purpose of compression dressing and on activity and exercise recommendations.       Electronically signed by Ish Beebe RN on 5/10/2023 at 1:47 PM
Absorbant [] ABD     [] Ace Wrap [] Other:    Limit contact of tape with skin. [x] Change dressing: [] Daily    [] Every Other Day   [x] Twice per week HH  [] Three times per week   [] Once a week [] Do Not Change Dressing   [] Other:     Pressure Relief:  [] When sitting, shift position or do seat lifts every 15 minutes.  [] Wheelchair cushion [] Specialty Bed/Mattress  [] Turn every 2 hours when in bed. Avoid direct pressure on wound site. Limit side lying to 30 degree tilt. Limit HOB elevation to 30 degrees. [] Other     Edema Control:  Apply: [] Compression Stocking:  mmHg  []Right Leg []Left Leg   [x] Tubigrip [x]Right Leg Double Layer []Left Leg Double Layer      []Right Leg Single Layer []Left Leg Single Layer      [x] Elevate leg(s) above the level of the heart when sitting. [x] Avoid prolonged standing in one place. Compression:  Apply: [x] Multilayer Compression Wrap to []RightLeg [x]Left Leg    []  Coflex [x] Coflex Lite  [] Unna with Calamine Lite     [x] Do not get leg(s) with wrap wet. If wrap becomes too tight, call the wound center and remove wrap from leg by unrolling each layer. [x] Elevate leg(s) above the level of the heart when sitting. [x] Avoid prolonged standing in one place.     Off-Loading:   [] Off-loading when: [] walking  [] in bed [] sitting  [] Total non-weight bearing  [] Right Leg  [] Left Leg   [] Assistive Device [] Walker [] Cane  [] Wheelchair  [] Crutches   [] Surgical shoe    [] Wedge Shoe  [] Foam Boot(s)  [] Knee Scooter    [] Cast Boot [] CROW Boot     [] Diabetic Shoes    [] Offloading heel boot  [] Other:     Dietary:  [x] Diet as tolerated: [] Calorie Diabetic Diet: [] No Added Salt:  [] Increase Protein: [] Other:     Activity:  [x] Activity as tolerated:    [] Patient has no activity restrictions      [] Strict Bedrest   [] Remain off Work      [] May return to full duty work                                     [] Return to work with
05/10/23 1317   Number of days: 0       Wound 05/10/23 Leg Left;Lateral #2 (Active)   Wound Image   05/10/23 1318   Wound Etiology Venous 05/10/23 1318   Dressing Status Dry; Intact; Clean 05/10/23 1318   Wound Cleansed Soap and water 05/10/23 1318   Wound Length (cm) 14.5 cm 05/10/23 1318   Wound Width (cm) 5.8 cm 05/10/23 1318   Wound Depth (cm) 0.2 cm 05/10/23 1318   Wound Surface Area (cm^2) 84.1 cm^2 05/10/23 1318   Wound Volume (cm^3) 16.82 cm^3 05/10/23 1318   Post-Procedure Length (cm) 14.5 cm 05/10/23 1318   Post-Procedure Width (cm) 5.8 cm 05/10/23 1318   Post-Procedure Depth (cm) 0.2 cm 05/10/23 1318   Post-Procedure Surface Area (cm^2) 84.1 cm^2 05/10/23 1318   Post-Procedure Volume (cm^3) 16.82 cm^3 05/10/23 1318   Wound Assessment Slough;Granulation tissue 05/10/23 1318   Drainage Amount Large 05/10/23 1318   Drainage Description Serosanguinous 05/10/23 1318   Odor None 05/10/23 1318   Emy-wound Assessment Intact 05/10/23 1318   Margins Attached edges 05/10/23 1318   Wound Thickness Description not for Pressure Injury Full thickness 05/10/23 1318   Number of days: 0        Total Surface Area Debrided:  98.5 sq cm   Estimated Blood Loss: None. Hemostasis Achieved:  by pressure  Procedural Pain: 3  / 10   Post Procedural Pain: 1 / 10   Response to treatment: Patient tolerated procedure well with no complaints of pain.

## 2023-05-10 NOTE — DISCHARGE INSTRUCTIONS
Absorbant [] ABD     [] Ace Wrap [] Other:    Limit contact of tape with skin. [x] Change dressing: [] Daily    [] Every Other Day   [x] Twice per week HH  [] Three times per week   [] Once a week [] Do Not Change Dressing   [] Other:     Pressure Relief:  [] When sitting, shift position or do seat lifts every 15 minutes.  [] Wheelchair cushion [] Specialty Bed/Mattress  [] Turn every 2 hours when in bed. Avoid direct pressure on wound site. Limit side lying to 30 degree tilt. Limit HOB elevation to 30 degrees. [] Other     Edema Control:  Apply: [] Compression Stocking:  mmHg  []Right Leg []Left Leg   [x] Tubigrip [x]Right Leg Double Layer []Left Leg Double Layer      []Right Leg Single Layer []Left Leg Single Layer      [x] Elevate leg(s) above the level of the heart when sitting. [x] Avoid prolonged standing in one place. Compression:  Apply: [x] Multilayer Compression Wrap to []RightLeg [x]Left Leg    []  Coflex [x] Coflex Lite  [] Unna with Calamine Lite     [x] Do not get leg(s) with wrap wet. If wrap becomes too tight, call the wound center and remove wrap from leg by unrolling each layer. [x] Elevate leg(s) above the level of the heart when sitting. [x] Avoid prolonged standing in one place.     Off-Loading:   [] Off-loading when: [] walking  [] in bed [] sitting  [] Total non-weight bearing  [] Right Leg  [] Left Leg   [] Assistive Device [] Walker [] Cane  [] Wheelchair  [] Crutches   [] Surgical shoe    [] Wedge Shoe  [] Foam Boot(s)  [] Knee Scooter    [] Cast Boot [] CROW Boot     [] Diabetic Shoes    [] Offloading heel boot  [] Other:     Dietary:  [x] Diet as tolerated: [] Calorie Diabetic Diet: [] No Added Salt:  [] Increase Protein: [] Other:     Activity:  [x] Activity as tolerated:    [] Patient has no activity restrictions      [] Strict Bedrest   [] Remain off Work      [] May return to full duty work                                     [] Return to work with

## 2023-05-17 ENCOUNTER — HOSPITAL ENCOUNTER (OUTPATIENT)
Facility: HOSPITAL | Age: 68
Discharge: HOME | End: 2023-05-17
Payer: MEDICARE

## 2023-05-17 VITALS
SYSTOLIC BLOOD PRESSURE: 133 MMHG | HEART RATE: 109 BPM | OXYGEN SATURATION: 95 % | DIASTOLIC BLOOD PRESSURE: 77 MMHG | TEMPERATURE: 98.7 F | RESPIRATION RATE: 20 BRPM

## 2023-05-17 PROBLEM — L97.822 NON-PRESSURE CHRONIC ULCER OF OTHER PART OF LEFT LOWER LEG WITH FAT LAYER EXPOSED (HCC): Status: ACTIVE | Noted: 2022-03-02

## 2023-05-17 PROCEDURE — 29581 APPL MULTLAYER CMPRN SYS LEG: CPT

## 2023-05-17 RX ORDER — LIDOCAINE HYDROCHLORIDE 20 MG/ML
15 SOLUTION OROPHARYNGEAL PRN
Status: DISCONTINUED | OUTPATIENT
Start: 2023-05-17 | End: 2023-05-18 | Stop reason: HOSPADM

## 2023-05-17 ASSESSMENT — PAIN DESCRIPTION - ORIENTATION: ORIENTATION: LEFT;RIGHT

## 2023-05-17 ASSESSMENT — PAIN SCALES - GENERAL: PAINLEVEL_OUTOF10: 9

## 2023-05-17 ASSESSMENT — PAIN DESCRIPTION - LOCATION: LOCATION: LEG

## 2023-05-17 NOTE — WOUND CARE
Wound Clinic Physician Orders and Discharge Instructions  [address], [address]  Telephone: [phone]    NAME:  Addy Pascal OF BIRTH:  1955  MEDICAL RECORD NUMBER:  [id number]  DATE:  5/17/2023      Return Appointment:  [] Dressing Supply Provider:   [x] Home Healthcare: Max Coffey  [x] Return Appointment: 1 Week(s)  [] Nurse Visit:     [] Discharge from [facility]: [] Healed        [] Refer to Provider:         [] Consult    Follow-up Information:  [] Ordered Tests:   [] Referrals:   [] Rx:   [] Other:       Wound Cleansing:   Do not scrub or use excessive force. Cleanse wound prior to applying a clean dressing with:  [x] Normal Saline   [] Keep Wound Dry in Shower     [] Wound Cleanser   [x] Cleanse wound with Mild Soap & Water    [] Other:       Topical Treatments:  Do not apply lotions, creams, or ointments to wound bed unless directed. [] Apply moisturizing lotion to skin surrounding the wound prior to dressing change.  [] Apply antifungal ointment to skin surrounding the wound prior to dressing change.  [] Apply thin film of moisture barrier ointment to skin immediately around wound.   [] Apply Betadine to skin immediately around wound   [] Other:      Dressings:           Wound Location   Left Leg  [x] Apply Primary Dressing:       [] MediHoney Gel [] MediHoney Alginate  [] Calcium Alginate with Silver   [] Calcium Alginate without silver   [] Collagen with silver [] Collagen without Silver    [] Santyl with moist saline gauze     [] Hydrofera Blue (cut to size and moistened with normal saline)  [] Hydrofera Blue Ready (cut to size)      [] Normal Saline wet to dry  [] Betadine wet to dry    [] Hydrogel  [] Mepitel     [] Bactroban/Mupirocin   [] Iodoform Packing Strip [] Plain Packing Strip   [] Skin Sub:   [x] Other:  mepilex transfer     [x] Cover and Secure with:     [x] Gauze [] Renita Fontanez []

## 2023-05-17 NOTE — WOUND CARE
Multilayer Compression Wrap   (Not Unna) Below the Knee    NAME:  Ivon Lanier OF BIRTH:  1955  MEDICAL RECORD NUMBER:  [id number]  DATE:  5/17/2023    Multilayer compression wrap: Removed old Multilayer wrap if indicated and wash leg with mild soap/water. Applied primary and secondary dressing as ordered. Applied multilayered dressing below the knee to left lower leg. Instructed patient/caregiver not to remove dressing and to keep it clean and dry. Instructed patient/caregiver on complications to report to provider, such as pain, numbness in toes, heavy drainage, and slippage of dressing. Instructed patient on purpose of compression dressing and on activity and exercise recommendations.       Electronically signed by Jatinder Cruz RN on 5/17/2023 at 2:13 PM

## 2023-05-17 NOTE — DISCHARGE INSTRUCTIONS
Wound Clinic Physician Orders and Discharge Instructions  [facility]  [address] , [address]  Telephone: [phone]    NAME:  Wenceslao Jones OF BIRTH:  1955  MEDICAL RECORD NUMBER:  [id number]  DATE:  5/17/2023      Return Appointment:  [] Dressing Supply Provider:   [x] Home Healthcare: Shon Rose  [x] Return Appointment: 1 Week(s)  [] Nurse Visit:     [] Discharge from [facility]: [] Healed        [] Refer to Provider:         [] Consult    Follow-up Information:  [] Ordered Tests:   [] Referrals:   [] Rx:   [] Other:       Wound Cleansing:   Do not scrub or use excessive force. Cleanse wound prior to applying a clean dressing with:  [x] Normal Saline   [] Keep Wound Dry in Shower     [] Wound Cleanser   [x] Cleanse wound with Mild Soap & Water    [] Other:       Topical Treatments:  Do not apply lotions, creams, or ointments to wound bed unless directed. [] Apply moisturizing lotion to skin surrounding the wound prior to dressing change.  [] Apply antifungal ointment to skin surrounding the wound prior to dressing change.  [] Apply thin film of moisture barrier ointment to skin immediately around wound.   [] Apply Betadine to skin immediately around wound   [] Other:      Dressings:           Wound Location   Left Leg  [x] Apply Primary Dressing:       [] MediHoney Gel [] MediHoney Alginate  [] Calcium Alginate with Silver   [] Calcium Alginate without silver   [] Collagen with silver [] Collagen without Silver    [] Santyl with moist saline gauze     [] Hydrofera Blue (cut to size and moistened with normal saline)  [] Hydrofera Blue Ready (cut to size)      [] Normal Saline wet to dry  [] Betadine wet to dry    [] Hydrogel  [] Mepitel     [] Bactroban/Mupirocin   [] Iodoform Packing Strip [] Plain Packing Strip   [] Skin Sub:   [x] Other:  mepilex transfer     [x] Cover and Secure with:     [x] Gauze [] Efren Terry []

## 2023-05-17 NOTE — WOUND CARE
[address] [facility]   Medical Staff Progress Note     [address] RECORD NUMBER:  [id number]  AGE: 75 y.o. GENDER: male  : 1955  EPISODE DATE:  2023    Chief complaint and reason for visit:   Left lower extremity venous wounds  Chief Complaint   Patient presents with    Wound Check     L and R leg      Patient presenting for follow up evaluation of wound(s) per chief complaint. Subjective: Symptoms, wound related issues, or other pertinent wound history since last visit: Since his last visit, the patient now presents with additional wound on the left lower extremity, felt caused by the wrap    Wound 05/10/23 Leg Posterior;Left;Distal #1 (Active)   Wound Image   23 1348   Wound Etiology Venous 23 1348   Dressing Status Clean;Dry; Intact 23 1348   Wound Cleansed Soap and water 23 1348   Wound Length (cm) 3.5 cm 23 1348   Wound Width (cm) 3.5 cm 23 1348   Wound Depth (cm) 0.3 cm 23 1348   Wound Surface Area (cm^2) 12.25 cm^2 23 1348   Change in Wound Size % (l*w) 14.93 23 1348   Wound Volume (cm^3) 3.675 cm^3 23 1348   Wound Healing % -155 23 1348   Post-Procedure Length (cm) 4 cm 05/10/23 1318   Post-Procedure Width (cm) 3.6 cm 05/10/23 1318   Post-Procedure Depth (cm) 0.1 cm 05/10/23 1318   Post-Procedure Surface Area (cm^2) 14.4 cm^2 05/10/23 1318   Post-Procedure Volume (cm^3) 1.44 cm^3 05/10/23 1318   Wound Assessment Slough;Granulation tissue 23 1348   Drainage Amount Copious 23 1348   Drainage Description Purulent 23 1348   Odor Mild 23 1348   Emy-wound Assessment Maceration;Fragile 23 1348   Margins Attached edges 23 1348   Wound Thickness Description not for Pressure Injury Full thickness 23 1348   Number of days: 7       Wound 05/10/23 Leg Left;Lateral #2 (Active)   Wound Image   23 2565   Wound

## 2023-05-24 ENCOUNTER — HOSPITAL ENCOUNTER (OUTPATIENT)
Facility: HOSPITAL | Age: 68
Discharge: HOME | End: 2023-05-24
Payer: MEDICARE

## 2023-05-24 VITALS
SYSTOLIC BLOOD PRESSURE: 127 MMHG | DIASTOLIC BLOOD PRESSURE: 84 MMHG | TEMPERATURE: 98.4 F | HEART RATE: 103 BPM | RESPIRATION RATE: 22 BRPM

## 2023-05-24 PROCEDURE — 11042 DBRDMT SUBQ TIS 1ST 20SQCM/<: CPT

## 2023-05-24 PROCEDURE — 11045 DBRDMT SUBQ TISS EACH ADDL: CPT

## 2023-05-24 RX ORDER — LIDOCAINE HYDROCHLORIDE 20 MG/ML
JELLY TOPICAL ONCE
OUTPATIENT
Start: 2023-05-24 | End: 2023-05-24

## 2023-05-24 RX ORDER — SODIUM CHLOR/HYPOCHLOROUS ACID 0.033 %
SOLUTION, IRRIGATION IRRIGATION ONCE
OUTPATIENT
Start: 2023-05-24 | End: 2023-05-24

## 2023-05-24 RX ORDER — LIDOCAINE HYDROCHLORIDE 20 MG/ML
15 SOLUTION OROPHARYNGEAL PRN
Status: DISCONTINUED | OUTPATIENT
Start: 2023-05-24 | End: 2023-05-25 | Stop reason: HOSPADM

## 2023-05-24 ASSESSMENT — PAIN DESCRIPTION - LOCATION: LOCATION: LEG

## 2023-05-24 ASSESSMENT — PAIN DESCRIPTION - DESCRIPTORS: DESCRIPTORS: ACHING

## 2023-05-24 ASSESSMENT — PAIN SCALES - GENERAL: PAINLEVEL_OUTOF10: 9

## 2023-05-24 ASSESSMENT — PAIN DESCRIPTION - ORIENTATION: ORIENTATION: LEFT

## 2023-05-24 NOTE — DISCHARGE INSTRUCTIONS
Wound Clinic Physician Orders and Discharge Instructions  [address]  [address] , [address]  Telephone: [phone]    NAME:  Carlitos Chao OF BIRTH:  1955  MEDICAL RECORD NUMBER:  [id number]  DATE:  5/24/2023      Return Appointment:  [] Dressing Supply Provider:   [x] Home Healthcare: Nadine Sanchez  [x] Return Appointment: 1 Week(s)  [] Nurse Visit:     [] Discharge from [facility]: [] Healed        [] Refer to Provider:         [] Consult    Follow-up Information:  [] Ordered Tests:   [] Referrals:   [] Rx:   [] Other:       Wound Cleansing:   Do not scrub or use excessive force. Cleanse wound prior to applying a clean dressing with:  [x] Normal Saline   [] Keep Wound Dry in Shower     [] Wound Cleanser   [x] Cleanse wound with Mild Soap & Water    [] Other:       Topical Treatments:  Do not apply lotions, creams, or ointments to wound bed unless directed. [] Apply moisturizing lotion to skin surrounding the wound prior to dressing change.  [] Apply antifungal ointment to skin surrounding the wound prior to dressing change.  [] Apply thin film of moisture barrier ointment to skin immediately around wound.   [] Apply Betadine to skin immediately around wound   [] Other:      Dressings:           Wound Location   Left Leg  [x] Apply Primary Dressing:       [] MediHoney Gel [] MediHoney Alginate  [] Calcium Alginate with Silver   [] Calcium Alginate without silver   [] Collagen with silver [] Collagen without Silver    [] Santyl with moist saline gauze     [] Hydrofera Blue (cut to size and moistened with normal saline)  [] Hydrofera Blue Ready (cut to size)      [] Normal Saline wet to dry  [] Betadine wet to dry    [] Hydrogel  [] Mepitel     [] Bactroban/Mupirocin   [] Iodoform Packing Strip [] Plain Packing Strip   [] Skin Sub:   [x] Other:  mepilex transfer     [x] Cover and Secure with:     [x] Gauze [] Toy Tingley []

## 2023-05-24 NOTE — WOUND CARE
Wound Clinic Physician Orders and Discharge Instructions  [facility]  [address] , [address]  Telephone: [phone]    NAME:  Max Brenner OF BIRTH:  1955  MEDICAL RECORD NUMBER:  [id number]  DATE:  5/24/2023      Return Appointment:  [] Dressing Supply Provider:   [x] Home Healthcare: Palmira Siemens  [x] Return Appointment: 1 Week(s)  [] Nurse Visit:     [] Discharge from [facility]: [] Healed        [] Refer to Provider:         [] Consult    Follow-up Information:  [] Ordered Tests:   [] Referrals:   [] Rx:   [] Other:       Wound Cleansing:   Do not scrub or use excessive force. Cleanse wound prior to applying a clean dressing with:  [x] Normal Saline   [] Keep Wound Dry in Shower     [] Wound Cleanser   [x] Cleanse wound with Mild Soap & Water    [] Other:       Topical Treatments:  Do not apply lotions, creams, or ointments to wound bed unless directed. [] Apply moisturizing lotion to skin surrounding the wound prior to dressing change.  [] Apply antifungal ointment to skin surrounding the wound prior to dressing change.  [] Apply thin film of moisture barrier ointment to skin immediately around wound.   [] Apply Betadine to skin immediately around wound   [] Other:      Dressings:           Wound Location   Left Leg  [x] Apply Primary Dressing:       [] MediHoney Gel [] MediHoney Alginate  [] Calcium Alginate with Silver   [] Calcium Alginate without silver   [] Collagen with silver [] Collagen without Silver    [] Santyl with moist saline gauze     [] Hydrofera Blue (cut to size and moistened with normal saline)  [] Hydrofera Blue Ready (cut to size)      [] Normal Saline wet to dry  [] Betadine wet to dry    [] Hydrogel  [] Mepitel     [] Bactroban/Mupirocin   [] Iodoform Packing Strip [] Plain Packing Strip   [] Skin Sub:   [x] Other:  mepilex transfer     [x] Cover and Secure with:     [x] Gauze [] Enciso Meline []

## 2023-05-24 NOTE — WOUND CARE
[facility]   Medical Staff Progress Note     [address] RECORD NUMBER:  [id number]  AGE: 75 y.o. GENDER: male  : 1955  EPISODE DATE:  2023    Chief complaint and reason for visit:   L leg venous ulcers  Chief Complaint   Patient presents with    Wound Check     L Leg      Patient presenting for follow up evaluation of wound(s) per chief complaint. Subjective: Symptoms, wound related issues, or other pertinent wound history since last visit: less drainage, less pain     Wound 05/10/23 Leg Posterior;Left;Distal #1 (Active)   Wound Image   23 1314   Wound Etiology Venous 23   Dressing Status Clean;Dry; Intact 23 131   Wound Cleansed Soap and water 23 131   Wound Length (cm) 3.5 cm 23 131   Wound Width (cm) 2.3 cm 23 1314   Wound Depth (cm) 0.1 cm 234   Wound Surface Area (cm^2) 8.05 cm^2 23 131   Change in Wound Size % (l*w) 44.1 234   Wound Volume (cm^3) 0.805 cm^3 23 1314   Wound Healing % 44 23 1314   Post-Procedure Length (cm) 4 cm 05/10/23 1318   Post-Procedure Width (cm) 3.6 cm 05/10/23 1318   Post-Procedure Depth (cm) 0.1 cm 05/10/23 1318   Post-Procedure Surface Area (cm^2) 14.4 cm^2 05/10/23 1318   Post-Procedure Volume (cm^3) 1.44 cm^3 05/10/23 1318   Wound Assessment Slough;Granulation tissue 23   Drainage Amount Moderate 23   Drainage Description Serosanguinous 23   Odor None 23   Emy-wound Assessment Fragile 23   Margins Attached edges 23   Wound Thickness Description not for Pressure Injury Full thickness 23   Number of days: 14       Wound 05/10/23 Leg Left;Lateral #2 (Active)   Wound Image   23 1314   Wound Etiology Venous 23 1314   Dressing Status Dry; Intact; Clean 23 1314   Wound Cleansed Soap and water 23 1314

## 2023-05-24 NOTE — WOUND CARE
Multilayer Compression Wrap   (Not Unna) Below the Knee    NAME:  Wild Mace OF BIRTH:  1955  MEDICAL RECORD NUMBER:  [id number]  DATE:  5/24/2023    Multilayer compression wrap: Removed old Multilayer wrap if indicated and wash leg with mild soap/water. Applied primary and secondary dressing as ordered. Applied multilayered dressing below the knee to left lower leg. Instructed patient/caregiver not to remove dressing and to keep it clean and dry. Instructed patient/caregiver on complications to report to provider, such as pain, numbness in toes, heavy drainage, and slippage of dressing. Instructed patient on purpose of compression dressing and on activity and exercise recommendations.       Electronically signed by Stephan Chapa RN on 5/24/2023 at 1:32 PM

## 2023-05-31 ENCOUNTER — HOSPITAL ENCOUNTER (OUTPATIENT)
Facility: HOSPITAL | Age: 68
Discharge: HOME | End: 2023-05-31
Payer: MEDICARE

## 2023-05-31 VITALS
HEART RATE: 106 BPM | SYSTOLIC BLOOD PRESSURE: 146 MMHG | TEMPERATURE: 98.3 F | DIASTOLIC BLOOD PRESSURE: 79 MMHG | OXYGEN SATURATION: 98 % | RESPIRATION RATE: 22 BRPM

## 2023-05-31 DIAGNOSIS — L97.822 NON-PRESSURE CHRONIC ULCER OF OTHER PART OF LEFT LOWER LEG WITH FAT LAYER EXPOSED (HCC): Primary | ICD-10-CM

## 2023-05-31 PROCEDURE — 29580 STRAPPING UNNA BOOT: CPT

## 2023-05-31 RX ORDER — SODIUM CHLOR/HYPOCHLOROUS ACID 0.033 %
SOLUTION, IRRIGATION IRRIGATION ONCE
OUTPATIENT
Start: 2023-05-31 | End: 2023-05-31

## 2023-05-31 RX ORDER — LIDOCAINE HYDROCHLORIDE 20 MG/ML
15 SOLUTION OROPHARYNGEAL PRN
Status: DISCONTINUED | OUTPATIENT
Start: 2023-05-31 | End: 2023-06-01 | Stop reason: HOSPADM

## 2023-05-31 RX ORDER — LIDOCAINE HYDROCHLORIDE 20 MG/ML
JELLY TOPICAL ONCE
OUTPATIENT
Start: 2023-05-31 | End: 2023-05-31

## 2023-05-31 ASSESSMENT — PAIN DESCRIPTION - LOCATION: LOCATION: LEG

## 2023-05-31 ASSESSMENT — PAIN SCALES - GENERAL: PAINLEVEL_OUTOF10: 10

## 2023-05-31 ASSESSMENT — PAIN DESCRIPTION - ORIENTATION: ORIENTATION: LEFT

## 2023-05-31 NOTE — DISCHARGE INSTRUCTIONS
Wound Clinic Physician Orders and Discharge Instructions  [address] , [address]  Telephone: [phone]    NAME:  Billy Morris OF BIRTH:  1955  MEDICAL RECORD NUMBER:  [id number]  DATE:  5/31/2023      Return Appointment:  [] Dressing Supply Provider:   [x] Home Healthcare: Atanacio Aase  [x] Return Appointment: 1 Week(s)  [] Nurse Visit:     [] Discharge from [facility]: [] Healed        [] Refer to Provider:         [] Consult    Follow-up Information:  [] Ordered Tests:   [] Referrals:   [] Rx:   [] Other:       Wound Cleansing:   Do not scrub or use excessive force. Cleanse wound prior to applying a clean dressing with:  [x] Normal Saline   [] Keep Wound Dry in Shower     [] Wound Cleanser   [x] Cleanse wound with Mild Soap & Water    [] Other:       Topical Treatments:  Do not apply lotions, creams, or ointments to wound bed unless directed. [] Apply moisturizing lotion to skin surrounding the wound prior to dressing change.  [] Apply antifungal ointment to skin surrounding the wound prior to dressing change.  [] Apply thin film of moisture barrier ointment to skin immediately around wound.   [] Apply Betadine to skin immediately around wound   [] Other:      Dressings:           Wound Location   Left Leg  [x] Apply Primary Dressing:       [] MediHoney Gel [] MediHoney Alginate  [] Calcium Alginate with Silver   [] Calcium Alginate without silver   [] Collagen with silver [] Collagen without Silver    [] Santyl with moist saline gauze     [] Hydrofera Blue (cut to size and moistened with normal saline)  [] Hydrofera Blue Ready (cut to size)      [] Normal Saline wet to dry  [] Betadine wet to dry    [] Hydrogel  [] Mepitel     [] Bactroban/Mupirocin   [] Iodoform Packing Strip [] Plain Packing Strip   [] Skin Sub:   [x] Other:  mepilex transfer     [x] Cover and Secure with:     [x] Gauze [] [place] Bradley []

## 2023-06-07 ENCOUNTER — HOSPITAL ENCOUNTER (OUTPATIENT)
Facility: HOSPITAL | Age: 68
Discharge: HOME | End: 2023-06-07
Payer: MEDICARE

## 2023-06-07 VITALS
HEART RATE: 84 BPM | RESPIRATION RATE: 22 BRPM | TEMPERATURE: 98.5 F | OXYGEN SATURATION: 98 % | SYSTOLIC BLOOD PRESSURE: 130 MMHG | DIASTOLIC BLOOD PRESSURE: 68 MMHG

## 2023-06-07 PROCEDURE — 87077 CULTURE AEROBIC IDENTIFY: CPT

## 2023-06-07 PROCEDURE — 11045 DBRDMT SUBQ TISS EACH ADDL: CPT

## 2023-06-07 PROCEDURE — 87205 SMEAR GRAM STAIN: CPT

## 2023-06-07 PROCEDURE — 11042 DBRDMT SUBQ TIS 1ST 20SQCM/<: CPT

## 2023-06-07 PROCEDURE — 87186 SC STD MICRODIL/AGAR DIL: CPT

## 2023-06-07 PROCEDURE — 87070 CULTURE OTHR SPECIMN AEROBIC: CPT

## 2023-06-07 RX ORDER — LIDOCAINE HYDROCHLORIDE 20 MG/ML
15 SOLUTION OROPHARYNGEAL AS NEEDED
Status: DISCONTINUED | OUTPATIENT
Start: 2023-06-07 | End: 2023-06-08 | Stop reason: HOSPADM

## 2023-06-07 NOTE — DISCHARGE INSTRUCTIONS
restrictions     Physician:  [x] Dr. James Camp  [] Dr. Justice Fitch  [] Dr. Nael Su      Nurse Case Manger:  Rehman Jing Information: Should you experience any significant changes in your wound(s) or have questions about your wound care, please contact the [address] [facility],[address] at [phone]. Our hours are Monday - Friday 8am - 4:30pm, closed all major holidays. If you need help with your wound outside these hours and cannot wait until we are again available, contact your PCP or go to the hospital emergency room. PLEASE NOTE: IF YOU ARE UNABLE TO OBTAIN WOUND SUPPLIES, CONTINUE TO USE THE SUPPLIES YOU HAVE AVAILABLE UNTIL YOU ARE ABLE TO REACH US. IT IS MOST IMPORTANT TO KEEP THE WOUND COVERED AT ALL TIMES.

## 2023-06-07 NOTE — WOUND CARE
[employer] Application   Below Knee    NAME:  Alva Mancuso OF BIRTH:  1955  MEDICAL RECORD NUMBER:  [id number]  DATE:  6/7/2023    Lavetta Linear boot: Appied primary and secondary dressing as ordered. Applied Unna roll from toes to knee overlapping each time. Applied ace wrap or coban from toes to below the knee. Secured with tape and/or metal clips covered with tape. Instructed patient/caregiver to keep dressing dry and intact. DO NOT REMOVE DRESSING. Instructed pt/family/caregiver to report excessive draining, loose bandage, wet dressing, severe pain or tingling in toes. Applied [employer] dressing below the knee to left lower leg. Unna Boot(s) were applied per  Guidelines.      Electronically signed by Kevon Harrington RN on 6/7/2023 at 2:36 PM
Wound Clinic Physician Orders and Discharge Instructions  [address]  [address], [address]  Telephone: [phone]    NAME:  Steven Chaves OF BIRTH:  1955  MEDICAL RECORD NUMBER:  [id number]  DATE:  6/7/2023      Return Appointment:  [] Dressing Supply Provider:   [x] Home Healthcare: Krystal Emery  [x] Return Appointment: 1 Week(s)  [] Nurse Visit:     [] Discharge from [facility]: [] Healed        [] Refer to Provider:         [] Consult    Follow-up Information:  [] Ordered Tests:   [] Referrals:   [] Rx:   [] Other:       Wound Cleansing:   Do not scrub or use excessive force. Cleanse wound prior to applying a clean dressing with:  [x] Normal Saline   [] Keep Wound Dry in Shower     [] Wound Cleanser   [x] Cleanse wound with Mild Soap & Water    [] Other:       Topical Treatments:  Do not apply lotions, creams, or ointments to wound bed unless directed. [] Apply moisturizing lotion to skin surrounding the wound prior to dressing change.  [] Apply antifungal ointment to skin surrounding the wound prior to dressing change.  [] Apply thin film of moisture barrier ointment to skin immediately around wound.   [] Apply Betadine to skin immediately around wound   [] Other:      Dressings:           Wound Location   Left Leg  [x] Apply Primary Dressing:       [] MediHoney Gel [] MediHoney Alginate  [] Calcium Alginate with Silver   [] Calcium Alginate without silver   [] Collagen with silver [] Collagen without Silver    [] Santyl with moist saline gauze     [] Hydrofera Blue (cut to size and moistened with normal saline)  [] Hydrofera Blue Ready (cut to size)      [] Normal Saline wet to dry  [] Betadine wet to dry    [] Hydrogel  [] Mepitel     [] Bactroban/Mupirocin   [] Iodoform Packing Strip [] Plain Packing Strip   [] Skin Sub:   [x] Other:  mepilex transfer     [x] Cover and Secure with:     [x] Gauze [] Aris [] Kerlix   []
cm^3 06/07/23 1410   Wound Healing % 42 06/07/23 1410   Post-Procedure Length (cm) 17 cm 06/07/23 1427   Post-Procedure Width (cm) 25 cm 06/07/23 1427   Post-Procedure Depth (cm) 0.2 cm 06/07/23 1427   Post-Procedure Surface Area (cm^2) 425 cm^2 06/07/23 1427   Post-Procedure Volume (cm^3) 85 cm^3 06/07/23 1427   Wound Assessment Granulation tissue;Slough 06/07/23 1410   Drainage Amount Large 06/07/23 1410   Drainage Description Serosanguinous;Green;Yellow 06/07/23 1410   Odor Moderate 06/07/23 1410   Emy-wound Assessment Maceration;Fragile 06/07/23 1410   Margins Attached edges 06/07/23 1410   Wound Thickness Description not for Pressure Injury Full thickness 06/07/23 1410   Number of days: 28        Total Surface Area Debrided:  425 sq cm   Estimated Blood Loss: None. Hemostasis Achieved:  by pressure  Procedural Pain: 4  / 10   Post Procedural Pain: 1 / 10   Response to treatment: Patient tolerated procedure well with minimal complaints of pain.

## 2023-06-10 LAB
BACTERIA SPEC CULT: ABNORMAL
GRAM STN SPEC: ABNORMAL
Lab: ABNORMAL

## 2023-06-14 ENCOUNTER — APPOINTMENT (OUTPATIENT)
Facility: HOSPITAL | Age: 68
DRG: 637 | End: 2023-06-14
Payer: MEDICARE

## 2023-06-14 ENCOUNTER — HOSPITAL ENCOUNTER (INPATIENT)
Facility: HOSPITAL | Age: 68
LOS: 6 days | Discharge: NURSING, REHAB OR LONG-TERM CARE | DRG: 637 | End: 2023-06-20
Attending: EMERGENCY MEDICINE | Admitting: FAMILY MEDICINE
Payer: MEDICARE

## 2023-06-14 DIAGNOSIS — L08.9 WOUND INFECTION: ICD-10-CM

## 2023-06-14 DIAGNOSIS — I50.9 ACUTE ON CHRONIC CONGESTIVE HEART FAILURE, UNSPECIFIED HEART FAILURE TYPE (HCC): Primary | ICD-10-CM

## 2023-06-14 DIAGNOSIS — T14.8XXA WOUND INFECTION: ICD-10-CM

## 2023-06-14 DIAGNOSIS — R06.02 SHORTNESS OF BREATH: ICD-10-CM

## 2023-06-14 DIAGNOSIS — I50.23 ACUTE ON CHRONIC SYSTOLIC CONGESTIVE HEART FAILURE (HCC): ICD-10-CM

## 2023-06-14 LAB
ALBUMIN SERPL-MCNC: 2.8 G/DL (ref 3.5–5)
ALBUMIN/GLOB SERPL: 0.8 (ref 1.1–2.2)
ALP SERPL-CCNC: 87 U/L (ref 45–117)
ALT SERPL-CCNC: 22 U/L (ref 12–78)
ANION GAP SERPL CALC-SCNC: 7 MMOL/L (ref 5–15)
AST SERPL W P-5'-P-CCNC: 23 U/L (ref 15–37)
BASOPHILS # BLD: 0 K/UL (ref 0–0.1)
BASOPHILS NFR BLD: 0 % (ref 0–1)
BILIRUB SERPL-MCNC: 0.4 MG/DL (ref 0.2–1)
BNP SERPL-MCNC: 373 PG/ML
BUN SERPL-MCNC: 25 MG/DL (ref 6–20)
BUN/CREAT SERPL: 20 (ref 12–20)
CA-I BLD-MCNC: 8.4 MG/DL (ref 8.5–10.1)
CHLORIDE SERPL-SCNC: 104 MMOL/L (ref 97–108)
CO2 SERPL-SCNC: 29 MMOL/L (ref 21–32)
CREAT SERPL-MCNC: 1.28 MG/DL (ref 0.7–1.3)
DIFFERENTIAL METHOD BLD: ABNORMAL
EKG ATRIAL RATE: 112 BPM
EKG DIAGNOSIS: NORMAL
EKG P AXIS: 43 DEGREES
EKG P-R INTERVAL: 152 MS
EKG Q-T INTERVAL: 368 MS
EKG QRS DURATION: 124 MS
EKG QTC CALCULATION (BAZETT): 502 MS
EKG R AXIS: 130 DEGREES
EKG T AXIS: -11 DEGREES
EKG VENTRICULAR RATE: 112 BPM
EOSINOPHIL # BLD: 0.3 K/UL (ref 0–0.4)
EOSINOPHIL NFR BLD: 3 % (ref 0–7)
ERYTHROCYTE [DISTWIDTH] IN BLOOD BY AUTOMATED COUNT: 14 % (ref 11.5–14.5)
GLOBULIN SER CALC-MCNC: 3.7 G/DL (ref 2–4)
GLUCOSE BLD STRIP.AUTO-MCNC: 249 MG/DL (ref 65–100)
GLUCOSE SERPL-MCNC: 245 MG/DL (ref 65–100)
HCT VFR BLD AUTO: 34.8 % (ref 36.6–50.3)
HGB BLD-MCNC: 11.1 G/DL (ref 12.1–17)
IMM GRANULOCYTES # BLD AUTO: 0 K/UL (ref 0–0.04)
IMM GRANULOCYTES NFR BLD AUTO: 0 % (ref 0–0.5)
LYMPHOCYTES # BLD: 1.7 K/UL (ref 0.8–3.5)
LYMPHOCYTES NFR BLD: 20 % (ref 12–49)
MCH RBC QN AUTO: 29.8 PG (ref 26–34)
MCHC RBC AUTO-ENTMCNC: 31.9 G/DL (ref 30–36.5)
MCV RBC AUTO: 93.5 FL (ref 80–99)
MONOCYTES # BLD: 0.7 K/UL (ref 0–1)
MONOCYTES NFR BLD: 9 % (ref 5–13)
NEUTS SEG # BLD: 5.8 K/UL (ref 1.8–8)
NEUTS SEG NFR BLD: 68 % (ref 32–75)
NRBC # BLD: 0 K/UL (ref 0–0.01)
NRBC BLD-RTO: 0 PER 100 WBC
PERFORMED BY:: ABNORMAL
PLATELET # BLD AUTO: 262 K/UL (ref 150–400)
PMV BLD AUTO: 10.7 FL (ref 8.9–12.9)
POTASSIUM SERPL-SCNC: 4.2 MMOL/L (ref 3.5–5.1)
PROT SERPL-MCNC: 6.5 G/DL (ref 6.4–8.2)
RBC # BLD AUTO: 3.72 M/UL (ref 4.1–5.7)
SODIUM SERPL-SCNC: 140 MMOL/L (ref 136–145)
TROPONIN I SERPL HS-MCNC: 20 NG/L (ref 0–76)
WBC # BLD AUTO: 8.6 K/UL (ref 4.1–11.1)

## 2023-06-14 PROCEDURE — 71045 X-RAY EXAM CHEST 1 VIEW: CPT

## 2023-06-14 PROCEDURE — 2580000003 HC RX 258: Performed by: FAMILY MEDICINE

## 2023-06-14 PROCEDURE — 2060000000 HC ICU INTERMEDIATE R&B

## 2023-06-14 PROCEDURE — 99285 EMERGENCY DEPT VISIT HI MDM: CPT

## 2023-06-14 PROCEDURE — 82962 GLUCOSE BLOOD TEST: CPT

## 2023-06-14 PROCEDURE — 71275 CT ANGIOGRAPHY CHEST: CPT

## 2023-06-14 PROCEDURE — 6370000000 HC RX 637 (ALT 250 FOR IP): Performed by: FAMILY MEDICINE

## 2023-06-14 PROCEDURE — 84484 ASSAY OF TROPONIN QUANT: CPT

## 2023-06-14 PROCEDURE — 6360000004 HC RX CONTRAST MEDICATION: Performed by: EMERGENCY MEDICINE

## 2023-06-14 PROCEDURE — 6360000002 HC RX W HCPCS: Performed by: FAMILY MEDICINE

## 2023-06-14 PROCEDURE — 80053 COMPREHEN METABOLIC PANEL: CPT

## 2023-06-14 PROCEDURE — 96374 THER/PROPH/DIAG INJ IV PUSH: CPT

## 2023-06-14 PROCEDURE — 93005 ELECTROCARDIOGRAM TRACING: CPT | Performed by: EMERGENCY MEDICINE

## 2023-06-14 PROCEDURE — 6360000002 HC RX W HCPCS: Performed by: EMERGENCY MEDICINE

## 2023-06-14 PROCEDURE — 36415 COLL VENOUS BLD VENIPUNCTURE: CPT

## 2023-06-14 PROCEDURE — 83880 ASSAY OF NATRIURETIC PEPTIDE: CPT

## 2023-06-14 PROCEDURE — 85025 COMPLETE CBC W/AUTO DIFF WBC: CPT

## 2023-06-14 RX ORDER — CIPROFLOXACIN 2 MG/ML
400 INJECTION, SOLUTION INTRAVENOUS EVERY 12 HOURS
Status: DISCONTINUED | OUTPATIENT
Start: 2023-06-14 | End: 2023-06-14

## 2023-06-14 RX ORDER — INSULIN LISPRO 100 [IU]/ML
0-4 INJECTION, SOLUTION INTRAVENOUS; SUBCUTANEOUS NIGHTLY
Status: DISCONTINUED | OUTPATIENT
Start: 2023-06-14 | End: 2023-06-20 | Stop reason: HOSPADM

## 2023-06-14 RX ORDER — METOLAZONE 5 MG/1
5 TABLET ORAL EVERY OTHER DAY
Status: DISCONTINUED | OUTPATIENT
Start: 2023-06-15 | End: 2023-06-20 | Stop reason: HOSPADM

## 2023-06-14 RX ORDER — CETIRIZINE HYDROCHLORIDE 10 MG/1
5 TABLET ORAL DAILY
Status: DISCONTINUED | OUTPATIENT
Start: 2023-06-15 | End: 2023-06-20 | Stop reason: HOSPADM

## 2023-06-14 RX ORDER — SODIUM CHLORIDE 0.9 % (FLUSH) 0.9 %
5-40 SYRINGE (ML) INJECTION PRN
Status: DISCONTINUED | OUTPATIENT
Start: 2023-06-14 | End: 2023-06-20 | Stop reason: HOSPADM

## 2023-06-14 RX ORDER — SODIUM CHLORIDE 9 MG/ML
INJECTION, SOLUTION INTRAVENOUS PRN
Status: DISCONTINUED | OUTPATIENT
Start: 2023-06-14 | End: 2023-06-20 | Stop reason: HOSPADM

## 2023-06-14 RX ORDER — INSULIN LISPRO 100 [IU]/ML
0-16 INJECTION, SOLUTION INTRAVENOUS; SUBCUTANEOUS
Status: DISCONTINUED | OUTPATIENT
Start: 2023-06-15 | End: 2023-06-20 | Stop reason: HOSPADM

## 2023-06-14 RX ORDER — FLUTICASONE PROPIONATE 50 MCG
2 SPRAY, SUSPENSION (ML) NASAL DAILY
Status: DISCONTINUED | OUTPATIENT
Start: 2023-06-15 | End: 2023-06-20 | Stop reason: HOSPADM

## 2023-06-14 RX ORDER — VITAMIN E 268 MG
800 CAPSULE ORAL 2 TIMES DAILY
Status: DISCONTINUED | OUTPATIENT
Start: 2023-06-15 | End: 2023-06-20 | Stop reason: HOSPADM

## 2023-06-14 RX ORDER — DEXTROSE MONOHYDRATE 100 MG/ML
INJECTION, SOLUTION INTRAVENOUS CONTINUOUS PRN
Status: DISCONTINUED | OUTPATIENT
Start: 2023-06-14 | End: 2023-06-20 | Stop reason: HOSPADM

## 2023-06-14 RX ORDER — ACETAMINOPHEN 650 MG/1
650 SUPPOSITORY RECTAL EVERY 6 HOURS PRN
Status: DISCONTINUED | OUTPATIENT
Start: 2023-06-14 | End: 2023-06-20 | Stop reason: HOSPADM

## 2023-06-14 RX ORDER — GABAPENTIN 300 MG/1
300 CAPSULE ORAL 4 TIMES DAILY
Status: DISCONTINUED | OUTPATIENT
Start: 2023-06-14 | End: 2023-06-20 | Stop reason: HOSPADM

## 2023-06-14 RX ORDER — ASCORBIC ACID 500 MG
1000 TABLET ORAL 2 TIMES DAILY
Status: DISCONTINUED | OUTPATIENT
Start: 2023-06-14 | End: 2023-06-20 | Stop reason: HOSPADM

## 2023-06-14 RX ORDER — ONDANSETRON 4 MG/1
4 TABLET, ORALLY DISINTEGRATING ORAL EVERY 8 HOURS PRN
Status: DISCONTINUED | OUTPATIENT
Start: 2023-06-14 | End: 2023-06-20 | Stop reason: HOSPADM

## 2023-06-14 RX ORDER — ACETAMINOPHEN 325 MG/1
650 TABLET ORAL EVERY 6 HOURS PRN
Status: DISCONTINUED | OUTPATIENT
Start: 2023-06-14 | End: 2023-06-20 | Stop reason: HOSPADM

## 2023-06-14 RX ORDER — OXYCODONE HYDROCHLORIDE 5 MG/1
5 TABLET ORAL 2 TIMES DAILY
Status: DISCONTINUED | OUTPATIENT
Start: 2023-06-14 | End: 2023-06-15

## 2023-06-14 RX ORDER — FUROSEMIDE 10 MG/ML
40 INJECTION INTRAMUSCULAR; INTRAVENOUS
Status: COMPLETED | OUTPATIENT
Start: 2023-06-14 | End: 2023-06-14

## 2023-06-14 RX ORDER — SODIUM CHLORIDE 0.9 % (FLUSH) 0.9 %
5-40 SYRINGE (ML) INJECTION EVERY 12 HOURS SCHEDULED
Status: DISCONTINUED | OUTPATIENT
Start: 2023-06-14 | End: 2023-06-20 | Stop reason: HOSPADM

## 2023-06-14 RX ORDER — ONDANSETRON 2 MG/ML
4 INJECTION INTRAMUSCULAR; INTRAVENOUS EVERY 6 HOURS PRN
Status: DISCONTINUED | OUTPATIENT
Start: 2023-06-14 | End: 2023-06-20 | Stop reason: HOSPADM

## 2023-06-14 RX ORDER — COLCHICINE 0.6 MG/1
0.6 TABLET ORAL DAILY
Status: DISCONTINUED | OUTPATIENT
Start: 2023-06-15 | End: 2023-06-20 | Stop reason: HOSPADM

## 2023-06-14 RX ORDER — B-COMPLEX WITH VITAMIN C
1 TABLET ORAL DAILY
Status: DISCONTINUED | OUTPATIENT
Start: 2023-06-15 | End: 2023-06-14

## 2023-06-14 RX ORDER — MORPHINE SULFATE 4 MG/ML
4 INJECTION, SOLUTION INTRAMUSCULAR; INTRAVENOUS
Status: COMPLETED | OUTPATIENT
Start: 2023-06-14 | End: 2023-06-14

## 2023-06-14 RX ORDER — ENOXAPARIN SODIUM 100 MG/ML
40 INJECTION SUBCUTANEOUS 2 TIMES DAILY
Status: DISCONTINUED | OUTPATIENT
Start: 2023-06-14 | End: 2023-06-20 | Stop reason: HOSPADM

## 2023-06-14 RX ORDER — ATORVASTATIN CALCIUM 40 MG/1
40 TABLET, FILM COATED ORAL NIGHTLY
Status: DISCONTINUED | OUTPATIENT
Start: 2023-06-14 | End: 2023-06-20 | Stop reason: HOSPADM

## 2023-06-14 RX ORDER — POLYETHYLENE GLYCOL 3350 17 G/17G
17 POWDER, FOR SOLUTION ORAL DAILY PRN
Status: DISCONTINUED | OUTPATIENT
Start: 2023-06-14 | End: 2023-06-20 | Stop reason: HOSPADM

## 2023-06-14 RX ORDER — BUMETANIDE 1 MG/1
2 TABLET ORAL 2 TIMES DAILY
Status: DISCONTINUED | OUTPATIENT
Start: 2023-06-15 | End: 2023-06-20 | Stop reason: HOSPADM

## 2023-06-14 RX ADMIN — ATORVASTATIN CALCIUM 40 MG: 40 TABLET, FILM COATED ORAL at 23:16

## 2023-06-14 RX ADMIN — OXYCODONE HYDROCHLORIDE AND ACETAMINOPHEN 1000 MG: 500 TABLET ORAL at 23:16

## 2023-06-14 RX ADMIN — MORPHINE SULFATE 4 MG: 4 INJECTION, SOLUTION INTRAMUSCULAR; INTRAVENOUS at 18:49

## 2023-06-14 RX ADMIN — PIPERACILLIN AND TAZOBACTAM 4500 MG: 4; .5 INJECTION, POWDER, LYOPHILIZED, FOR SOLUTION INTRAVENOUS at 23:19

## 2023-06-14 RX ADMIN — GABAPENTIN 300 MG: 300 CAPSULE ORAL at 23:16

## 2023-06-14 RX ADMIN — FUROSEMIDE 40 MG: 10 INJECTION, SOLUTION INTRAMUSCULAR; INTRAVENOUS at 20:31

## 2023-06-14 RX ADMIN — OXYCODONE HYDROCHLORIDE 5 MG: 5 TABLET ORAL at 23:17

## 2023-06-14 RX ADMIN — CIPROFLOXACIN 400 MG: 400 INJECTION, SOLUTION INTRAVENOUS at 20:31

## 2023-06-14 RX ADMIN — ENOXAPARIN SODIUM 40 MG: 100 INJECTION SUBCUTANEOUS at 23:17

## 2023-06-14 RX ADMIN — IOPAMIDOL 100 ML: 755 INJECTION, SOLUTION INTRAVENOUS at 18:33

## 2023-06-14 RX ADMIN — SODIUM CHLORIDE, PRESERVATIVE FREE 10 ML: 5 INJECTION INTRAVENOUS at 23:19

## 2023-06-14 ASSESSMENT — LIFESTYLE VARIABLES
HOW MANY STANDARD DRINKS CONTAINING ALCOHOL DO YOU HAVE ON A TYPICAL DAY: PATIENT DOES NOT DRINK
HOW OFTEN DO YOU HAVE A DRINK CONTAINING ALCOHOL: NEVER

## 2023-06-14 ASSESSMENT — PAIN SCALES - GENERAL
PAINLEVEL_OUTOF10: 10
PAINLEVEL_OUTOF10: 10

## 2023-06-14 ASSESSMENT — PAIN DESCRIPTION - LOCATION: LOCATION: OTHER (COMMENT)

## 2023-06-14 ASSESSMENT — PAIN - FUNCTIONAL ASSESSMENT: PAIN_FUNCTIONAL_ASSESSMENT: 0-10

## 2023-06-15 PROBLEM — I50.9 ACUTE ON CHRONIC CONGESTIVE HEART FAILURE (HCC): Status: ACTIVE | Noted: 2023-06-15

## 2023-06-15 PROBLEM — L08.9 WOUND INFECTION: Status: ACTIVE | Noted: 2023-06-15

## 2023-06-15 PROBLEM — L97.909 VENOUS STASIS ULCER (HCC): Status: ACTIVE | Noted: 2023-06-15

## 2023-06-15 PROBLEM — I83.009 VENOUS STASIS ULCER (HCC): Status: ACTIVE | Noted: 2023-06-15

## 2023-06-15 PROBLEM — T14.8XXA WOUND INFECTION: Status: ACTIVE | Noted: 2023-06-15

## 2023-06-15 LAB
ALBUMIN SERPL-MCNC: 2.9 G/DL (ref 3.5–5)
ALBUMIN/GLOB SERPL: 0.7 (ref 1.1–2.2)
ALP SERPL-CCNC: 84 U/L (ref 45–117)
ALT SERPL-CCNC: 22 U/L (ref 12–78)
ANION GAP SERPL CALC-SCNC: 4 MMOL/L (ref 5–15)
AST SERPL W P-5'-P-CCNC: 17 U/L (ref 15–37)
BASOPHILS # BLD: 0 K/UL (ref 0–0.1)
BASOPHILS NFR BLD: 0 % (ref 0–1)
BILIRUB SERPL-MCNC: 0.5 MG/DL (ref 0.2–1)
BNP SERPL-MCNC: 365 PG/ML
BUN SERPL-MCNC: 26 MG/DL (ref 6–20)
BUN/CREAT SERPL: 21 (ref 12–20)
CA-I BLD-MCNC: 8.6 MG/DL (ref 8.5–10.1)
CHLORIDE SERPL-SCNC: 106 MMOL/L (ref 97–108)
CO2 SERPL-SCNC: 31 MMOL/L (ref 21–32)
CREAT SERPL-MCNC: 1.25 MG/DL (ref 0.7–1.3)
DIFFERENTIAL METHOD BLD: ABNORMAL
EOSINOPHIL # BLD: 0.3 K/UL (ref 0–0.4)
EOSINOPHIL NFR BLD: 3 % (ref 0–7)
ERYTHROCYTE [DISTWIDTH] IN BLOOD BY AUTOMATED COUNT: 14 % (ref 11.5–14.5)
EST. AVERAGE GLUCOSE BLD GHB EST-MCNC: 146 MG/DL
GLOBULIN SER CALC-MCNC: 4 G/DL (ref 2–4)
GLUCOSE BLD STRIP.AUTO-MCNC: 157 MG/DL (ref 65–100)
GLUCOSE BLD STRIP.AUTO-MCNC: 198 MG/DL (ref 65–100)
GLUCOSE BLD STRIP.AUTO-MCNC: 207 MG/DL (ref 65–100)
GLUCOSE BLD STRIP.AUTO-MCNC: 218 MG/DL (ref 65–100)
GLUCOSE SERPL-MCNC: 144 MG/DL (ref 65–100)
HBA1C MFR BLD: 6.7 % (ref 4–5.6)
HCT VFR BLD AUTO: 34 % (ref 36.6–50.3)
HGB BLD-MCNC: 10.7 G/DL (ref 12.1–17)
IMM GRANULOCYTES # BLD AUTO: 0 K/UL (ref 0–0.04)
IMM GRANULOCYTES NFR BLD AUTO: 0 % (ref 0–0.5)
LYMPHOCYTES # BLD: 1.6 K/UL (ref 0.8–3.5)
LYMPHOCYTES NFR BLD: 20 % (ref 12–49)
MCH RBC QN AUTO: 29.6 PG (ref 26–34)
MCHC RBC AUTO-ENTMCNC: 31.5 G/DL (ref 30–36.5)
MCV RBC AUTO: 93.9 FL (ref 80–99)
MONOCYTES # BLD: 0.8 K/UL (ref 0–1)
MONOCYTES NFR BLD: 10 % (ref 5–13)
NEUTS SEG # BLD: 5.3 K/UL (ref 1.8–8)
NEUTS SEG NFR BLD: 67 % (ref 32–75)
NRBC # BLD: 0 K/UL (ref 0–0.01)
NRBC BLD-RTO: 0 PER 100 WBC
PERFORMED BY:: ABNORMAL
PLATELET # BLD AUTO: 259 K/UL (ref 150–400)
POTASSIUM SERPL-SCNC: 3.5 MMOL/L (ref 3.5–5.1)
PROT SERPL-MCNC: 6.9 G/DL (ref 6.4–8.2)
RBC # BLD AUTO: 3.62 M/UL (ref 4.1–5.7)
SODIUM SERPL-SCNC: 141 MMOL/L (ref 136–145)
WBC # BLD AUTO: 8 K/UL (ref 4.1–11.1)

## 2023-06-15 PROCEDURE — 36415 COLL VENOUS BLD VENIPUNCTURE: CPT

## 2023-06-15 PROCEDURE — 82962 GLUCOSE BLOOD TEST: CPT

## 2023-06-15 PROCEDURE — 2700000000 HC OXYGEN THERAPY PER DAY

## 2023-06-15 PROCEDURE — 2580000003 HC RX 258: Performed by: FAMILY MEDICINE

## 2023-06-15 PROCEDURE — 6370000000 HC RX 637 (ALT 250 FOR IP): Performed by: FAMILY MEDICINE

## 2023-06-15 PROCEDURE — 80053 COMPREHEN METABOLIC PANEL: CPT

## 2023-06-15 PROCEDURE — 83880 ASSAY OF NATRIURETIC PEPTIDE: CPT

## 2023-06-15 PROCEDURE — 2500000003 HC RX 250 WO HCPCS: Performed by: FAMILY MEDICINE

## 2023-06-15 PROCEDURE — 2060000000 HC ICU INTERMEDIATE R&B

## 2023-06-15 PROCEDURE — 85025 COMPLETE CBC W/AUTO DIFF WBC: CPT

## 2023-06-15 PROCEDURE — 99222 1ST HOSP IP/OBS MODERATE 55: CPT | Performed by: INTERNAL MEDICINE

## 2023-06-15 PROCEDURE — 87040 BLOOD CULTURE FOR BACTERIA: CPT

## 2023-06-15 PROCEDURE — 6360000002 HC RX W HCPCS: Performed by: FAMILY MEDICINE

## 2023-06-15 PROCEDURE — 83036 HEMOGLOBIN GLYCOSYLATED A1C: CPT

## 2023-06-15 RX ORDER — OXYCODONE HYDROCHLORIDE 10 MG/1
10 TABLET ORAL 2 TIMES DAILY
Status: DISCONTINUED | OUTPATIENT
Start: 2023-06-15 | End: 2023-06-15

## 2023-06-15 RX ORDER — MORPHINE SULFATE 2 MG/ML
2 INJECTION, SOLUTION INTRAMUSCULAR; INTRAVENOUS ONCE
Status: COMPLETED | OUTPATIENT
Start: 2023-06-15 | End: 2023-06-15

## 2023-06-15 RX ORDER — L. ACIDOPHILUS/PECTIN, CITRUS 25MM-100MG
1 TABLET ORAL 2 TIMES DAILY
Status: DISCONTINUED | OUTPATIENT
Start: 2023-06-15 | End: 2023-06-20 | Stop reason: HOSPADM

## 2023-06-15 RX ORDER — OXYCODONE HYDROCHLORIDE 10 MG/1
10 TABLET ORAL EVERY 6 HOURS PRN
Status: DISCONTINUED | OUTPATIENT
Start: 2023-06-15 | End: 2023-06-20 | Stop reason: HOSPADM

## 2023-06-15 RX ADMIN — OXYCODONE HYDROCHLORIDE 10 MG: 10 TABLET ORAL at 19:27

## 2023-06-15 RX ADMIN — GABAPENTIN 300 MG: 300 CAPSULE ORAL at 13:06

## 2023-06-15 RX ADMIN — GABAPENTIN 300 MG: 300 CAPSULE ORAL at 09:51

## 2023-06-15 RX ADMIN — FLUTICASONE PROPIONATE 2 SPRAY: 50 SPRAY, METERED NASAL at 13:06

## 2023-06-15 RX ADMIN — PIPERACILLIN AND TAZOBACTAM 3375 MG: 3; .375 INJECTION, POWDER, LYOPHILIZED, FOR SOLUTION INTRAVENOUS at 19:48

## 2023-06-15 RX ADMIN — GABAPENTIN 300 MG: 300 CAPSULE ORAL at 21:22

## 2023-06-15 RX ADMIN — GABAPENTIN 300 MG: 300 CAPSULE ORAL at 17:40

## 2023-06-15 RX ADMIN — SODIUM CHLORIDE, PRESERVATIVE FREE 10 ML: 5 INJECTION INTRAVENOUS at 21:49

## 2023-06-15 RX ADMIN — Medication 800 UNITS: at 21:32

## 2023-06-15 RX ADMIN — INSULIN LISPRO 0 UNITS: 100 INJECTION, SOLUTION INTRAVENOUS; SUBCUTANEOUS at 21:33

## 2023-06-15 RX ADMIN — OXYCODONE HYDROCHLORIDE AND ACETAMINOPHEN 1000 MG: 500 TABLET ORAL at 21:32

## 2023-06-15 RX ADMIN — ENOXAPARIN SODIUM 40 MG: 100 INJECTION SUBCUTANEOUS at 21:21

## 2023-06-15 RX ADMIN — INSULIN LISPRO 4 UNITS: 100 INJECTION, SOLUTION INTRAVENOUS; SUBCUTANEOUS at 17:40

## 2023-06-15 RX ADMIN — COLCHICINE 0.6 MG: 0.6 TABLET, FILM COATED ORAL at 09:51

## 2023-06-15 RX ADMIN — PIPERACILLIN AND TAZOBACTAM 3375 MG: 3; .375 INJECTION, POWDER, LYOPHILIZED, FOR SOLUTION INTRAVENOUS at 13:06

## 2023-06-15 RX ADMIN — Medication 800 UNITS: at 09:52

## 2023-06-15 RX ADMIN — Medication 1 TABLET: at 21:32

## 2023-06-15 RX ADMIN — BUMETANIDE 2 MG: 1 TABLET ORAL at 21:32

## 2023-06-15 RX ADMIN — BUMETANIDE 2 MG: 1 TABLET ORAL at 09:52

## 2023-06-15 RX ADMIN — OXYCODONE HYDROCHLORIDE 5 MG: 5 TABLET ORAL at 09:51

## 2023-06-15 RX ADMIN — OXYCODONE HYDROCHLORIDE AND ACETAMINOPHEN 1000 MG: 500 TABLET ORAL at 09:51

## 2023-06-15 RX ADMIN — CETIRIZINE HYDROCHLORIDE 5 MG: 10 TABLET, FILM COATED ORAL at 09:52

## 2023-06-15 RX ADMIN — PIPERACILLIN AND TAZOBACTAM 3375 MG: 3; .375 INJECTION, POWDER, LYOPHILIZED, FOR SOLUTION INTRAVENOUS at 03:22

## 2023-06-15 RX ADMIN — ENOXAPARIN SODIUM 40 MG: 100 INJECTION SUBCUTANEOUS at 09:52

## 2023-06-15 RX ADMIN — ATORVASTATIN CALCIUM 40 MG: 40 TABLET, FILM COATED ORAL at 21:32

## 2023-06-15 RX ADMIN — MORPHINE SULFATE 2 MG: 2 INJECTION, SOLUTION INTRAMUSCULAR; INTRAVENOUS at 03:20

## 2023-06-15 RX ADMIN — SODIUM CHLORIDE, PRESERVATIVE FREE 10 ML: 5 INJECTION INTRAVENOUS at 09:52

## 2023-06-15 RX ADMIN — Medication 1 TABLET: at 13:15

## 2023-06-15 ASSESSMENT — PAIN SCALES - GENERAL
PAINLEVEL_OUTOF10: 10
PAINLEVEL_OUTOF10: 4
PAINLEVEL_OUTOF10: 10

## 2023-06-15 ASSESSMENT — PAIN - FUNCTIONAL ASSESSMENT: PAIN_FUNCTIONAL_ASSESSMENT: PREVENTS OR INTERFERES SOME ACTIVE ACTIVITIES AND ADLS

## 2023-06-15 ASSESSMENT — PAIN DESCRIPTION - DESCRIPTORS
DESCRIPTORS: ACHING
DESCRIPTORS: STABBING
DESCRIPTORS: ACHING

## 2023-06-15 ASSESSMENT — PAIN DESCRIPTION - PAIN TYPE: TYPE: ACUTE PAIN

## 2023-06-15 ASSESSMENT — PAIN DESCRIPTION - LOCATION
LOCATION: LEG
LOCATION: BACK;LEG
LOCATION: LEG
LOCATION: LEG

## 2023-06-15 ASSESSMENT — PAIN DESCRIPTION - ORIENTATION
ORIENTATION: LEFT

## 2023-06-16 LAB
GLUCOSE BLD STRIP.AUTO-MCNC: 121 MG/DL (ref 65–100)
GLUCOSE BLD STRIP.AUTO-MCNC: 166 MG/DL (ref 65–100)
GLUCOSE BLD STRIP.AUTO-MCNC: 168 MG/DL (ref 65–100)
GLUCOSE BLD STRIP.AUTO-MCNC: 235 MG/DL (ref 65–100)
PERFORMED BY:: ABNORMAL

## 2023-06-16 PROCEDURE — 6370000000 HC RX 637 (ALT 250 FOR IP): Performed by: INTERNAL MEDICINE

## 2023-06-16 PROCEDURE — 94640 AIRWAY INHALATION TREATMENT: CPT

## 2023-06-16 PROCEDURE — 6360000002 HC RX W HCPCS: Performed by: FAMILY MEDICINE

## 2023-06-16 PROCEDURE — 2700000000 HC OXYGEN THERAPY PER DAY

## 2023-06-16 PROCEDURE — 82962 GLUCOSE BLOOD TEST: CPT

## 2023-06-16 PROCEDURE — 6370000000 HC RX 637 (ALT 250 FOR IP): Performed by: FAMILY MEDICINE

## 2023-06-16 PROCEDURE — 2060000000 HC ICU INTERMEDIATE R&B

## 2023-06-16 PROCEDURE — 97530 THERAPEUTIC ACTIVITIES: CPT

## 2023-06-16 PROCEDURE — 94761 N-INVAS EAR/PLS OXIMETRY MLT: CPT

## 2023-06-16 PROCEDURE — 2500000003 HC RX 250 WO HCPCS: Performed by: FAMILY MEDICINE

## 2023-06-16 PROCEDURE — 97161 PT EVAL LOW COMPLEX 20 MIN: CPT

## 2023-06-16 PROCEDURE — 99232 SBSQ HOSP IP/OBS MODERATE 35: CPT | Performed by: INTERNAL MEDICINE

## 2023-06-16 PROCEDURE — 2580000003 HC RX 258: Performed by: FAMILY MEDICINE

## 2023-06-16 RX ORDER — IPRATROPIUM BROMIDE AND ALBUTEROL SULFATE 2.5; .5 MG/3ML; MG/3ML
1 SOLUTION RESPIRATORY (INHALATION)
Status: DISCONTINUED | OUTPATIENT
Start: 2023-06-16 | End: 2023-06-20 | Stop reason: HOSPADM

## 2023-06-16 RX ADMIN — GABAPENTIN 300 MG: 300 CAPSULE ORAL at 22:09

## 2023-06-16 RX ADMIN — COLCHICINE 0.6 MG: 0.6 TABLET, FILM COATED ORAL at 09:50

## 2023-06-16 RX ADMIN — BUMETANIDE 2 MG: 1 TABLET ORAL at 21:14

## 2023-06-16 RX ADMIN — FLUTICASONE PROPIONATE 2 SPRAY: 50 SPRAY, METERED NASAL at 09:51

## 2023-06-16 RX ADMIN — CETIRIZINE HYDROCHLORIDE 5 MG: 10 TABLET, FILM COATED ORAL at 09:50

## 2023-06-16 RX ADMIN — Medication 1 TABLET: at 22:09

## 2023-06-16 RX ADMIN — ENOXAPARIN SODIUM 40 MG: 100 INJECTION SUBCUTANEOUS at 22:08

## 2023-06-16 RX ADMIN — IPRATROPIUM BROMIDE AND ALBUTEROL SULFATE 1 DOSE: 2.5; .5 SOLUTION RESPIRATORY (INHALATION) at 13:29

## 2023-06-16 RX ADMIN — ENOXAPARIN SODIUM 40 MG: 100 INJECTION SUBCUTANEOUS at 09:50

## 2023-06-16 RX ADMIN — ATORVASTATIN CALCIUM 40 MG: 40 TABLET, FILM COATED ORAL at 22:10

## 2023-06-16 RX ADMIN — IPRATROPIUM BROMIDE AND ALBUTEROL SULFATE 1 DOSE: 2.5; .5 SOLUTION RESPIRATORY (INHALATION) at 20:39

## 2023-06-16 RX ADMIN — OXYCODONE HYDROCHLORIDE AND ACETAMINOPHEN 1000 MG: 500 TABLET ORAL at 09:49

## 2023-06-16 RX ADMIN — GABAPENTIN 300 MG: 300 CAPSULE ORAL at 18:10

## 2023-06-16 RX ADMIN — SODIUM CHLORIDE, PRESERVATIVE FREE 10 ML: 5 INJECTION INTRAVENOUS at 09:51

## 2023-06-16 RX ADMIN — OXYCODONE HYDROCHLORIDE 10 MG: 10 TABLET ORAL at 22:10

## 2023-06-16 RX ADMIN — GABAPENTIN 300 MG: 300 CAPSULE ORAL at 12:22

## 2023-06-16 RX ADMIN — OXYCODONE HYDROCHLORIDE 10 MG: 10 TABLET ORAL at 16:55

## 2023-06-16 RX ADMIN — BUMETANIDE 2 MG: 1 TABLET ORAL at 09:50

## 2023-06-16 RX ADMIN — PIPERACILLIN AND TAZOBACTAM 3375 MG: 3; .375 INJECTION, POWDER, LYOPHILIZED, FOR SOLUTION INTRAVENOUS at 12:22

## 2023-06-16 RX ADMIN — OXYCODONE HYDROCHLORIDE 10 MG: 10 TABLET ORAL at 03:36

## 2023-06-16 RX ADMIN — PIPERACILLIN AND TAZOBACTAM 3375 MG: 3; .375 INJECTION, POWDER, LYOPHILIZED, FOR SOLUTION INTRAVENOUS at 21:13

## 2023-06-16 RX ADMIN — Medication 800 UNITS: at 22:09

## 2023-06-16 RX ADMIN — OXYCODONE HYDROCHLORIDE AND ACETAMINOPHEN 1000 MG: 500 TABLET ORAL at 22:10

## 2023-06-16 RX ADMIN — OXYCODONE HYDROCHLORIDE 10 MG: 10 TABLET ORAL at 09:58

## 2023-06-16 RX ADMIN — Medication 800 UNITS: at 09:50

## 2023-06-16 RX ADMIN — PIPERACILLIN AND TAZOBACTAM 3375 MG: 3; .375 INJECTION, POWDER, LYOPHILIZED, FOR SOLUTION INTRAVENOUS at 03:44

## 2023-06-16 RX ADMIN — SODIUM CHLORIDE, PRESERVATIVE FREE 10 ML: 5 INJECTION INTRAVENOUS at 21:14

## 2023-06-16 RX ADMIN — GABAPENTIN 300 MG: 300 CAPSULE ORAL at 09:50

## 2023-06-16 RX ADMIN — Medication 1 TABLET: at 09:50

## 2023-06-16 ASSESSMENT — PAIN DESCRIPTION - LOCATION
LOCATION: LEG
LOCATION: BACK;LEG;NECK
LOCATION: LEG
LOCATION: BACK;FLANK;LEG

## 2023-06-16 ASSESSMENT — PAIN DESCRIPTION - ORIENTATION
ORIENTATION: LEFT
ORIENTATION: LEFT

## 2023-06-16 ASSESSMENT — PAIN DESCRIPTION - DESCRIPTORS
DESCRIPTORS: THROBBING;STABBING
DESCRIPTORS: STABBING;SQUEEZING

## 2023-06-16 ASSESSMENT — PAIN SCALES - GENERAL
PAINLEVEL_OUTOF10: 10
PAINLEVEL_OUTOF10: 9
PAINLEVEL_OUTOF10: 8
PAINLEVEL_OUTOF10: 9
PAINLEVEL_OUTOF10: 10
PAINLEVEL_OUTOF10: 10

## 2023-06-16 ASSESSMENT — ENCOUNTER SYMPTOMS
EYES NEGATIVE: 1
COUGH: 1
SHORTNESS OF BREATH: 1
GASTROINTESTINAL NEGATIVE: 1

## 2023-06-17 LAB
GLUCOSE BLD STRIP.AUTO-MCNC: 198 MG/DL (ref 65–100)
GLUCOSE BLD STRIP.AUTO-MCNC: 223 MG/DL (ref 65–100)
GLUCOSE BLD STRIP.AUTO-MCNC: 248 MG/DL (ref 65–100)
GLUCOSE BLD STRIP.AUTO-MCNC: 262 MG/DL (ref 65–100)
PERFORMED BY:: ABNORMAL

## 2023-06-17 PROCEDURE — 2500000003 HC RX 250 WO HCPCS: Performed by: FAMILY MEDICINE

## 2023-06-17 PROCEDURE — 2060000000 HC ICU INTERMEDIATE R&B

## 2023-06-17 PROCEDURE — 82962 GLUCOSE BLOOD TEST: CPT

## 2023-06-17 PROCEDURE — 94640 AIRWAY INHALATION TREATMENT: CPT

## 2023-06-17 PROCEDURE — 6360000002 HC RX W HCPCS: Performed by: FAMILY MEDICINE

## 2023-06-17 PROCEDURE — 2700000000 HC OXYGEN THERAPY PER DAY

## 2023-06-17 PROCEDURE — 2580000003 HC RX 258: Performed by: FAMILY MEDICINE

## 2023-06-17 PROCEDURE — 94761 N-INVAS EAR/PLS OXIMETRY MLT: CPT

## 2023-06-17 PROCEDURE — 5A09357 ASSISTANCE WITH RESPIRATORY VENTILATION, LESS THAN 24 CONSECUTIVE HOURS, CONTINUOUS POSITIVE AIRWAY PRESSURE: ICD-10-PCS | Performed by: FAMILY MEDICINE

## 2023-06-17 PROCEDURE — 6370000000 HC RX 637 (ALT 250 FOR IP): Performed by: FAMILY MEDICINE

## 2023-06-17 PROCEDURE — 99232 SBSQ HOSP IP/OBS MODERATE 35: CPT | Performed by: INTERNAL MEDICINE

## 2023-06-17 PROCEDURE — 6370000000 HC RX 637 (ALT 250 FOR IP): Performed by: INTERNAL MEDICINE

## 2023-06-17 RX ADMIN — ENOXAPARIN SODIUM 40 MG: 100 INJECTION SUBCUTANEOUS at 09:28

## 2023-06-17 RX ADMIN — ENOXAPARIN SODIUM 40 MG: 100 INJECTION SUBCUTANEOUS at 21:54

## 2023-06-17 RX ADMIN — COLCHICINE 0.6 MG: 0.6 TABLET, FILM COATED ORAL at 09:28

## 2023-06-17 RX ADMIN — Medication 800 UNITS: at 09:28

## 2023-06-17 RX ADMIN — PIPERACILLIN AND TAZOBACTAM 3375 MG: 3; .375 INJECTION, POWDER, LYOPHILIZED, FOR SOLUTION INTRAVENOUS at 21:15

## 2023-06-17 RX ADMIN — IPRATROPIUM BROMIDE AND ALBUTEROL SULFATE 1 DOSE: 2.5; .5 SOLUTION RESPIRATORY (INHALATION) at 14:21

## 2023-06-17 RX ADMIN — GABAPENTIN 300 MG: 300 CAPSULE ORAL at 21:53

## 2023-06-17 RX ADMIN — INSULIN LISPRO 8 UNITS: 100 INJECTION, SOLUTION INTRAVENOUS; SUBCUTANEOUS at 11:51

## 2023-06-17 RX ADMIN — PIPERACILLIN AND TAZOBACTAM 3375 MG: 3; .375 INJECTION, POWDER, LYOPHILIZED, FOR SOLUTION INTRAVENOUS at 04:23

## 2023-06-17 RX ADMIN — IPRATROPIUM BROMIDE AND ALBUTEROL SULFATE 1 DOSE: 2.5; .5 SOLUTION RESPIRATORY (INHALATION) at 21:38

## 2023-06-17 RX ADMIN — PIPERACILLIN AND TAZOBACTAM 3375 MG: 3; .375 INJECTION, POWDER, LYOPHILIZED, FOR SOLUTION INTRAVENOUS at 11:52

## 2023-06-17 RX ADMIN — SODIUM CHLORIDE, PRESERVATIVE FREE 10 ML: 5 INJECTION INTRAVENOUS at 21:00

## 2023-06-17 RX ADMIN — GABAPENTIN 300 MG: 300 CAPSULE ORAL at 12:00

## 2023-06-17 RX ADMIN — FLUTICASONE PROPIONATE 2 SPRAY: 50 SPRAY, METERED NASAL at 09:34

## 2023-06-17 RX ADMIN — OXYCODONE HYDROCHLORIDE 10 MG: 10 TABLET ORAL at 09:27

## 2023-06-17 RX ADMIN — SODIUM CHLORIDE, PRESERVATIVE FREE 10 ML: 5 INJECTION INTRAVENOUS at 11:40

## 2023-06-17 RX ADMIN — METOLAZONE 5 MG: 5 TABLET ORAL at 09:28

## 2023-06-17 RX ADMIN — OXYCODONE HYDROCHLORIDE 10 MG: 10 TABLET ORAL at 16:56

## 2023-06-17 RX ADMIN — Medication 1 TABLET: at 09:28

## 2023-06-17 RX ADMIN — OXYCODONE HYDROCHLORIDE AND ACETAMINOPHEN 1000 MG: 500 TABLET ORAL at 21:53

## 2023-06-17 RX ADMIN — GABAPENTIN 300 MG: 300 CAPSULE ORAL at 09:28

## 2023-06-17 RX ADMIN — OXYCODONE HYDROCHLORIDE 10 MG: 10 TABLET ORAL at 04:29

## 2023-06-17 RX ADMIN — OXYCODONE HYDROCHLORIDE 10 MG: 10 TABLET ORAL at 21:56

## 2023-06-17 RX ADMIN — ATORVASTATIN CALCIUM 40 MG: 40 TABLET, FILM COATED ORAL at 21:54

## 2023-06-17 RX ADMIN — BUMETANIDE 2 MG: 1 TABLET ORAL at 09:27

## 2023-06-17 RX ADMIN — Medication 1 TABLET: at 21:53

## 2023-06-17 RX ADMIN — GABAPENTIN 300 MG: 300 CAPSULE ORAL at 16:55

## 2023-06-17 RX ADMIN — Medication 800 UNITS: at 21:54

## 2023-06-17 RX ADMIN — IPRATROPIUM BROMIDE AND ALBUTEROL SULFATE 1 DOSE: 2.5; .5 SOLUTION RESPIRATORY (INHALATION) at 09:23

## 2023-06-17 RX ADMIN — CETIRIZINE HYDROCHLORIDE 5 MG: 10 TABLET, FILM COATED ORAL at 09:27

## 2023-06-17 RX ADMIN — OXYCODONE HYDROCHLORIDE AND ACETAMINOPHEN 1000 MG: 500 TABLET ORAL at 09:28

## 2023-06-17 RX ADMIN — BUMETANIDE 2 MG: 1 TABLET ORAL at 21:53

## 2023-06-17 RX ADMIN — INSULIN LISPRO 4 UNITS: 100 INJECTION, SOLUTION INTRAVENOUS; SUBCUTANEOUS at 16:56

## 2023-06-17 ASSESSMENT — ENCOUNTER SYMPTOMS
COUGH: 1
GASTROINTESTINAL NEGATIVE: 1
EYES NEGATIVE: 1
SHORTNESS OF BREATH: 1

## 2023-06-17 ASSESSMENT — PAIN SCALES - GENERAL
PAINLEVEL_OUTOF10: 9
PAINLEVEL_OUTOF10: 9
PAINLEVEL_OUTOF10: 7
PAINLEVEL_OUTOF10: 10
PAINLEVEL_OUTOF10: 5

## 2023-06-17 ASSESSMENT — PAIN DESCRIPTION - LOCATION
LOCATION: BACK;LEG
LOCATION: BACK
LOCATION: BACK;LEG
LOCATION: BACK;LEG;NECK

## 2023-06-17 ASSESSMENT — PAIN DESCRIPTION - ORIENTATION
ORIENTATION: LEFT
ORIENTATION: LEFT

## 2023-06-17 ASSESSMENT — PAIN DESCRIPTION - DESCRIPTORS
DESCRIPTORS: ACHING
DESCRIPTORS: ACHING

## 2023-06-18 LAB
GLUCOSE BLD STRIP.AUTO-MCNC: 200 MG/DL (ref 65–100)
GLUCOSE BLD STRIP.AUTO-MCNC: 245 MG/DL (ref 65–100)
GLUCOSE BLD STRIP.AUTO-MCNC: 256 MG/DL (ref 65–100)
GLUCOSE BLD STRIP.AUTO-MCNC: 263 MG/DL (ref 65–100)
PERFORMED BY:: ABNORMAL

## 2023-06-18 PROCEDURE — 94761 N-INVAS EAR/PLS OXIMETRY MLT: CPT

## 2023-06-18 PROCEDURE — 82962 GLUCOSE BLOOD TEST: CPT

## 2023-06-18 PROCEDURE — 6370000000 HC RX 637 (ALT 250 FOR IP): Performed by: INTERNAL MEDICINE

## 2023-06-18 PROCEDURE — 99232 SBSQ HOSP IP/OBS MODERATE 35: CPT | Performed by: INTERNAL MEDICINE

## 2023-06-18 PROCEDURE — 2060000000 HC ICU INTERMEDIATE R&B

## 2023-06-18 PROCEDURE — 97110 THERAPEUTIC EXERCISES: CPT

## 2023-06-18 PROCEDURE — 6370000000 HC RX 637 (ALT 250 FOR IP): Performed by: FAMILY MEDICINE

## 2023-06-18 PROCEDURE — 2700000000 HC OXYGEN THERAPY PER DAY

## 2023-06-18 PROCEDURE — 6360000002 HC RX W HCPCS: Performed by: FAMILY MEDICINE

## 2023-06-18 PROCEDURE — 2500000003 HC RX 250 WO HCPCS: Performed by: FAMILY MEDICINE

## 2023-06-18 PROCEDURE — 2580000003 HC RX 258: Performed by: FAMILY MEDICINE

## 2023-06-18 PROCEDURE — 97530 THERAPEUTIC ACTIVITIES: CPT

## 2023-06-18 PROCEDURE — 94640 AIRWAY INHALATION TREATMENT: CPT

## 2023-06-18 RX ADMIN — OXYCODONE HYDROCHLORIDE 10 MG: 10 TABLET ORAL at 09:03

## 2023-06-18 RX ADMIN — ENOXAPARIN SODIUM 40 MG: 100 INJECTION SUBCUTANEOUS at 08:58

## 2023-06-18 RX ADMIN — OXYCODONE HYDROCHLORIDE 10 MG: 10 TABLET ORAL at 04:17

## 2023-06-18 RX ADMIN — GABAPENTIN 300 MG: 300 CAPSULE ORAL at 08:57

## 2023-06-18 RX ADMIN — BUMETANIDE 2 MG: 1 TABLET ORAL at 08:57

## 2023-06-18 RX ADMIN — FLUTICASONE PROPIONATE 2 SPRAY: 50 SPRAY, METERED NASAL at 08:57

## 2023-06-18 RX ADMIN — IPRATROPIUM BROMIDE AND ALBUTEROL SULFATE 1 DOSE: 2.5; .5 SOLUTION RESPIRATORY (INHALATION) at 14:06

## 2023-06-18 RX ADMIN — INSULIN LISPRO 8 UNITS: 100 INJECTION, SOLUTION INTRAVENOUS; SUBCUTANEOUS at 17:25

## 2023-06-18 RX ADMIN — PIPERACILLIN AND TAZOBACTAM 3375 MG: 3; .375 INJECTION, POWDER, LYOPHILIZED, FOR SOLUTION INTRAVENOUS at 04:17

## 2023-06-18 RX ADMIN — OXYCODONE HYDROCHLORIDE AND ACETAMINOPHEN 1000 MG: 500 TABLET ORAL at 20:35

## 2023-06-18 RX ADMIN — Medication 800 UNITS: at 20:35

## 2023-06-18 RX ADMIN — OXYCODONE HYDROCHLORIDE AND ACETAMINOPHEN 1000 MG: 500 TABLET ORAL at 08:57

## 2023-06-18 RX ADMIN — IPRATROPIUM BROMIDE AND ALBUTEROL SULFATE 1 DOSE: 2.5; .5 SOLUTION RESPIRATORY (INHALATION) at 08:26

## 2023-06-18 RX ADMIN — Medication 1 TABLET: at 08:57

## 2023-06-18 RX ADMIN — PIPERACILLIN AND TAZOBACTAM 3375 MG: 3; .375 INJECTION, POWDER, LYOPHILIZED, FOR SOLUTION INTRAVENOUS at 13:05

## 2023-06-18 RX ADMIN — SODIUM CHLORIDE, PRESERVATIVE FREE 10 ML: 5 INJECTION INTRAVENOUS at 20:35

## 2023-06-18 RX ADMIN — BUMETANIDE 2 MG: 1 TABLET ORAL at 20:35

## 2023-06-18 RX ADMIN — Medication 800 UNITS: at 08:57

## 2023-06-18 RX ADMIN — PIPERACILLIN AND TAZOBACTAM 3375 MG: 3; .375 INJECTION, POWDER, LYOPHILIZED, FOR SOLUTION INTRAVENOUS at 20:35

## 2023-06-18 RX ADMIN — CETIRIZINE HYDROCHLORIDE 5 MG: 10 TABLET, FILM COATED ORAL at 08:56

## 2023-06-18 RX ADMIN — IPRATROPIUM BROMIDE AND ALBUTEROL SULFATE 1 DOSE: 2.5; .5 SOLUTION RESPIRATORY (INHALATION) at 20:21

## 2023-06-18 RX ADMIN — GABAPENTIN 300 MG: 300 CAPSULE ORAL at 12:58

## 2023-06-18 RX ADMIN — COLCHICINE 0.6 MG: 0.6 TABLET, FILM COATED ORAL at 08:57

## 2023-06-18 RX ADMIN — OXYCODONE HYDROCHLORIDE 10 MG: 10 TABLET ORAL at 20:35

## 2023-06-18 RX ADMIN — ENOXAPARIN SODIUM 40 MG: 100 INJECTION SUBCUTANEOUS at 20:36

## 2023-06-18 RX ADMIN — GABAPENTIN 300 MG: 300 CAPSULE ORAL at 17:25

## 2023-06-18 RX ADMIN — OXYCODONE HYDROCHLORIDE 10 MG: 10 TABLET ORAL at 12:58

## 2023-06-18 RX ADMIN — INSULIN LISPRO 4 UNITS: 100 INJECTION, SOLUTION INTRAVENOUS; SUBCUTANEOUS at 08:58

## 2023-06-18 RX ADMIN — Medication 1 TABLET: at 20:35

## 2023-06-18 RX ADMIN — INSULIN LISPRO 4 UNITS: 100 INJECTION, SOLUTION INTRAVENOUS; SUBCUTANEOUS at 12:58

## 2023-06-18 RX ADMIN — ATORVASTATIN CALCIUM 40 MG: 40 TABLET, FILM COATED ORAL at 20:35

## 2023-06-18 RX ADMIN — SODIUM CHLORIDE, PRESERVATIVE FREE 10 ML: 5 INJECTION INTRAVENOUS at 08:58

## 2023-06-18 RX ADMIN — GABAPENTIN 300 MG: 300 CAPSULE ORAL at 20:35

## 2023-06-18 ASSESSMENT — PAIN SCALES - GENERAL
PAINLEVEL_OUTOF10: 7
PAINLEVEL_OUTOF10: 10
PAINLEVEL_OUTOF10: 8
PAINLEVEL_OUTOF10: 9
PAINLEVEL_OUTOF10: 4

## 2023-06-18 ASSESSMENT — ENCOUNTER SYMPTOMS
EYES NEGATIVE: 1
SHORTNESS OF BREATH: 1
COUGH: 1
GASTROINTESTINAL NEGATIVE: 1

## 2023-06-18 ASSESSMENT — PAIN DESCRIPTION - ORIENTATION: ORIENTATION: LEFT

## 2023-06-18 ASSESSMENT — PAIN DESCRIPTION - LOCATION
LOCATION: LEG
LOCATION: BACK;LEG;NECK

## 2023-06-18 ASSESSMENT — PAIN DESCRIPTION - DESCRIPTORS: DESCRIPTORS: ACHING

## 2023-06-19 ENCOUNTER — APPOINTMENT (OUTPATIENT)
Facility: HOSPITAL | Age: 68
DRG: 637 | End: 2023-06-19
Attending: INTERNAL MEDICINE
Payer: MEDICARE

## 2023-06-19 LAB
GLUCOSE BLD STRIP.AUTO-MCNC: 225 MG/DL (ref 65–100)
GLUCOSE BLD STRIP.AUTO-MCNC: 245 MG/DL (ref 65–100)
GLUCOSE BLD STRIP.AUTO-MCNC: 257 MG/DL (ref 65–100)
GLUCOSE BLD STRIP.AUTO-MCNC: 308 MG/DL (ref 65–100)
PERFORMED BY:: ABNORMAL

## 2023-06-19 PROCEDURE — 2700000000 HC OXYGEN THERAPY PER DAY

## 2023-06-19 PROCEDURE — 94640 AIRWAY INHALATION TREATMENT: CPT

## 2023-06-19 PROCEDURE — 6360000002 HC RX W HCPCS: Performed by: FAMILY MEDICINE

## 2023-06-19 PROCEDURE — 99232 SBSQ HOSP IP/OBS MODERATE 35: CPT | Performed by: INTERNAL MEDICINE

## 2023-06-19 PROCEDURE — C8924 2D TTE W OR W/O FOL W/CON,FU: HCPCS

## 2023-06-19 PROCEDURE — 2500000003 HC RX 250 WO HCPCS: Performed by: FAMILY MEDICINE

## 2023-06-19 PROCEDURE — 6360000004 HC RX CONTRAST MEDICATION

## 2023-06-19 PROCEDURE — 82962 GLUCOSE BLOOD TEST: CPT

## 2023-06-19 PROCEDURE — 2580000003 HC RX 258: Performed by: FAMILY MEDICINE

## 2023-06-19 PROCEDURE — 6370000000 HC RX 637 (ALT 250 FOR IP): Performed by: INTERNAL MEDICINE

## 2023-06-19 PROCEDURE — 2060000000 HC ICU INTERMEDIATE R&B

## 2023-06-19 PROCEDURE — 6370000000 HC RX 637 (ALT 250 FOR IP): Performed by: FAMILY MEDICINE

## 2023-06-19 RX ORDER — SORBITOL SOLUTION 70 %
30 SOLUTION, ORAL MISCELLANEOUS ONCE
Status: COMPLETED | OUTPATIENT
Start: 2023-06-19 | End: 2023-06-19

## 2023-06-19 RX ADMIN — PIPERACILLIN AND TAZOBACTAM 3375 MG: 3; .375 INJECTION, POWDER, LYOPHILIZED, FOR SOLUTION INTRAVENOUS at 04:59

## 2023-06-19 RX ADMIN — INSULIN LISPRO 4 UNITS: 100 INJECTION, SOLUTION INTRAVENOUS; SUBCUTANEOUS at 09:58

## 2023-06-19 RX ADMIN — SORBITOL SOLUTION (BULK) 30 ML: 70 SOLUTION at 12:10

## 2023-06-19 RX ADMIN — IPRATROPIUM BROMIDE AND ALBUTEROL SULFATE 1 DOSE: 2.5; .5 SOLUTION RESPIRATORY (INHALATION) at 21:08

## 2023-06-19 RX ADMIN — OXYCODONE HYDROCHLORIDE 10 MG: 10 TABLET ORAL at 12:09

## 2023-06-19 RX ADMIN — INSULIN LISPRO 4 UNITS: 100 INJECTION, SOLUTION INTRAVENOUS; SUBCUTANEOUS at 16:47

## 2023-06-19 RX ADMIN — GABAPENTIN 300 MG: 300 CAPSULE ORAL at 16:47

## 2023-06-19 RX ADMIN — COLCHICINE 0.6 MG: 0.6 TABLET, FILM COATED ORAL at 09:58

## 2023-06-19 RX ADMIN — GABAPENTIN 300 MG: 300 CAPSULE ORAL at 09:57

## 2023-06-19 RX ADMIN — OXYCODONE HYDROCHLORIDE 10 MG: 10 TABLET ORAL at 23:53

## 2023-06-19 RX ADMIN — PERFLUTREN: 6.52 INJECTION, SUSPENSION INTRAVENOUS at 14:54

## 2023-06-19 RX ADMIN — PIPERACILLIN AND TAZOBACTAM 3375 MG: 3; .375 INJECTION, POWDER, LYOPHILIZED, FOR SOLUTION INTRAVENOUS at 20:29

## 2023-06-19 RX ADMIN — METOLAZONE 5 MG: 5 TABLET ORAL at 09:55

## 2023-06-19 RX ADMIN — Medication 1 TABLET: at 20:30

## 2023-06-19 RX ADMIN — INSULIN LISPRO 12 UNITS: 100 INJECTION, SOLUTION INTRAVENOUS; SUBCUTANEOUS at 12:08

## 2023-06-19 RX ADMIN — BUMETANIDE 2 MG: 1 TABLET ORAL at 20:30

## 2023-06-19 RX ADMIN — GABAPENTIN 300 MG: 300 CAPSULE ORAL at 12:11

## 2023-06-19 RX ADMIN — OXYCODONE HYDROCHLORIDE 10 MG: 10 TABLET ORAL at 04:59

## 2023-06-19 RX ADMIN — ENOXAPARIN SODIUM 40 MG: 100 INJECTION SUBCUTANEOUS at 09:59

## 2023-06-19 RX ADMIN — ENOXAPARIN SODIUM 40 MG: 100 INJECTION SUBCUTANEOUS at 20:30

## 2023-06-19 RX ADMIN — OXYCODONE HYDROCHLORIDE AND ACETAMINOPHEN 1000 MG: 500 TABLET ORAL at 09:57

## 2023-06-19 RX ADMIN — GABAPENTIN 300 MG: 300 CAPSULE ORAL at 20:30

## 2023-06-19 RX ADMIN — Medication 800 UNITS: at 20:30

## 2023-06-19 RX ADMIN — ATORVASTATIN CALCIUM 40 MG: 40 TABLET, FILM COATED ORAL at 20:30

## 2023-06-19 RX ADMIN — SODIUM CHLORIDE, PRESERVATIVE FREE 10 ML: 5 INJECTION INTRAVENOUS at 20:30

## 2023-06-19 RX ADMIN — OXYCODONE HYDROCHLORIDE 10 MG: 10 TABLET ORAL at 18:22

## 2023-06-19 RX ADMIN — OXYCODONE HYDROCHLORIDE AND ACETAMINOPHEN 1000 MG: 500 TABLET ORAL at 20:30

## 2023-06-19 RX ADMIN — BUMETANIDE 2 MG: 1 TABLET ORAL at 09:58

## 2023-06-19 RX ADMIN — Medication 1 TABLET: at 09:56

## 2023-06-19 RX ADMIN — Medication 800 UNITS: at 09:58

## 2023-06-19 RX ADMIN — FLUTICASONE PROPIONATE 2 SPRAY: 50 SPRAY, METERED NASAL at 09:59

## 2023-06-19 RX ADMIN — SODIUM CHLORIDE, PRESERVATIVE FREE 10 ML: 5 INJECTION INTRAVENOUS at 10:04

## 2023-06-19 RX ADMIN — IPRATROPIUM BROMIDE AND ALBUTEROL SULFATE 1 DOSE: 2.5; .5 SOLUTION RESPIRATORY (INHALATION) at 13:11

## 2023-06-19 RX ADMIN — PIPERACILLIN AND TAZOBACTAM 3375 MG: 3; .375 INJECTION, POWDER, LYOPHILIZED, FOR SOLUTION INTRAVENOUS at 12:10

## 2023-06-19 RX ADMIN — CETIRIZINE HYDROCHLORIDE 5 MG: 10 TABLET, FILM COATED ORAL at 09:56

## 2023-06-19 ASSESSMENT — ENCOUNTER SYMPTOMS
GASTROINTESTINAL NEGATIVE: 1
EYES NEGATIVE: 1
COUGH: 1
SHORTNESS OF BREATH: 1

## 2023-06-19 ASSESSMENT — PAIN SCALES - GENERAL
PAINLEVEL_OUTOF10: 0
PAINLEVEL_OUTOF10: 9
PAINLEVEL_OUTOF10: 8

## 2023-06-19 ASSESSMENT — PAIN DESCRIPTION - LOCATION: LOCATION: BACK;LEG;NECK

## 2023-06-19 NOTE — CARE COORDINATION
Chart reviewed, patient recc for SNF by PT/OT, CM met with patient to discuss, patient stated that he would only consider [facility]. CM explained IRF vs SNF, patient unwilling to agree to referral being sent to SNF, stated he would rather return home. Referral sent via careport to [facility], awaiting possible acceptance. CM continues to follow and monitor for needs.

## 2023-06-20 VITALS
BODY MASS INDEX: 44.1 KG/M2 | DIASTOLIC BLOOD PRESSURE: 66 MMHG | SYSTOLIC BLOOD PRESSURE: 107 MMHG | HEART RATE: 98 BPM | TEMPERATURE: 98.1 F | WEIGHT: 315 LBS | OXYGEN SATURATION: 96 % | HEIGHT: 71 IN | RESPIRATION RATE: 18 BRPM

## 2023-06-20 LAB
ECHO AO ASC DIAM: 3.4 CM
ECHO AO ASCENDING AORTA INDEX: 1.24 CM/M2
ECHO AO ROOT DIAM: 3.8 CM
ECHO AO ROOT INDEX: 1.38 CM/M2
ECHO AV AREA PEAK VELOCITY: 2.3 CM2
ECHO AV AREA VTI: 2.1 CM2
ECHO AV AREA/BSA PEAK VELOCITY: 0.8 CM2/M2
ECHO AV AREA/BSA VTI: 0.8 CM2/M2
ECHO AV MEAN GRADIENT: 4 MMHG
ECHO AV MEAN VELOCITY: 1 M/S
ECHO AV PEAK GRADIENT: 8 MMHG
ECHO AV PEAK VELOCITY: 1.4 M/S
ECHO AV VELOCITY RATIO: 0.71
ECHO AV VTI: 28 CM
ECHO BSA: 2.86 M2
ECHO LA AREA 2C: 27.8 CM2
ECHO LA AREA 4C: 27.2 CM2
ECHO LA DIAMETER INDEX: 1.49 CM/M2
ECHO LA DIAMETER: 4.1 CM
ECHO LA MAJOR AXIS: 6.6 CM
ECHO LA MINOR AXIS: 6.3 CM
ECHO LA TO AORTIC ROOT RATIO: 1.08
ECHO LA VOL 2C: 99 ML (ref 18–58)
ECHO LA VOL 4C: 90 ML (ref 18–58)
ECHO LA VOL BP: 96 ML (ref 18–58)
ECHO LA VOL/BSA BIPLANE: 35 ML/M2 (ref 16–34)
ECHO LA VOLUME INDEX A2C: 36 ML/M2 (ref 16–34)
ECHO LA VOLUME INDEX A4C: 33 ML/M2 (ref 16–34)
ECHO LV E' LATERAL VELOCITY: 6 CM/S
ECHO LV E' SEPTAL VELOCITY: 7 CM/S
ECHO LV FRACTIONAL SHORTENING: 36 % (ref 28–44)
ECHO LV INTERNAL DIMENSION DIASTOLE INDEX: 2.22 CM/M2
ECHO LV INTERNAL DIMENSION DIASTOLIC: 6.1 CM (ref 4.2–5.9)
ECHO LV INTERNAL DIMENSION SYSTOLIC INDEX: 1.42 CM/M2
ECHO LV INTERNAL DIMENSION SYSTOLIC: 3.9 CM
ECHO LV IVSD: 1.1 CM (ref 0.6–1)
ECHO LV MASS 2D: 270.5 G (ref 88–224)
ECHO LV MASS INDEX 2D: 98.4 G/M2 (ref 49–115)
ECHO LV POSTERIOR WALL DIASTOLIC: 1 CM (ref 0.6–1)
ECHO LV RELATIVE WALL THICKNESS RATIO: 0.33
ECHO LVOT AREA: 3.1 CM2
ECHO LVOT AV VTI INDEX: 0.68
ECHO LVOT DIAM: 2 CM
ECHO LVOT MEAN GRADIENT: 2 MMHG
ECHO LVOT PEAK GRADIENT: 4 MMHG
ECHO LVOT PEAK VELOCITY: 1 M/S
ECHO LVOT STROKE VOLUME INDEX: 21.7 ML/M2
ECHO LVOT SV: 59.7 ML
ECHO LVOT VTI: 19 CM
ECHO MV A VELOCITY: 0.93 M/S
ECHO MV E DECELERATION TIME (DT): 186 MS
ECHO MV E VELOCITY: 0.57 M/S
ECHO MV E/A RATIO: 0.61
ECHO MV E/E' LATERAL: 9.5
ECHO MV E/E' RATIO (AVERAGED): 8.82
ECHO MV E/E' SEPTAL: 8.14
ECHO PV MAX VELOCITY: 0.8 M/S
ECHO PV PEAK GRADIENT: 3 MMHG
ECHO RA AREA 4C: 20.8 CM2
ECHO RA END SYSTOLIC VOLUME APICAL 4 CHAMBER INDEX BSA: 24 ML/M2
ECHO RA VOLUME: 65 ML
ECHO RV TAPSE: 2.1 CM (ref 1.7–?)
ECHO TV REGURGITANT MAX VELOCITY: 1.49 M/S
ECHO TV REGURGITANT PEAK GRADIENT: 9 MMHG
ERYTHROCYTE [DISTWIDTH] IN BLOOD BY AUTOMATED COUNT: 13.7 % (ref 11.5–14.5)
GLUCOSE BLD STRIP.AUTO-MCNC: 221 MG/DL (ref 65–100)
GLUCOSE BLD STRIP.AUTO-MCNC: 334 MG/DL (ref 65–100)
HCT VFR BLD AUTO: 32.2 % (ref 36.6–50.3)
HGB BLD-MCNC: 10.6 G/DL (ref 12.1–17)
MCH RBC QN AUTO: 30.2 PG (ref 26–34)
MCHC RBC AUTO-ENTMCNC: 32.9 G/DL (ref 30–36.5)
MCV RBC AUTO: 91.7 FL (ref 80–99)
NRBC # BLD: 0 K/UL (ref 0–0.01)
NRBC BLD-RTO: 0 PER 100 WBC
PERFORMED BY:: ABNORMAL
PERFORMED BY:: ABNORMAL
PLATELET # BLD AUTO: 256 K/UL (ref 150–400)
PMV BLD AUTO: 11 FL (ref 8.9–12.9)
RBC # BLD AUTO: 3.51 M/UL (ref 4.1–5.7)
WBC # BLD AUTO: 5.8 K/UL (ref 4.1–11.1)

## 2023-06-20 PROCEDURE — 36415 COLL VENOUS BLD VENIPUNCTURE: CPT

## 2023-06-20 PROCEDURE — 82962 GLUCOSE BLOOD TEST: CPT

## 2023-06-20 PROCEDURE — 6360000002 HC RX W HCPCS: Performed by: FAMILY MEDICINE

## 2023-06-20 PROCEDURE — 6370000000 HC RX 637 (ALT 250 FOR IP): Performed by: INTERNAL MEDICINE

## 2023-06-20 PROCEDURE — 2580000003 HC RX 258: Performed by: FAMILY MEDICINE

## 2023-06-20 PROCEDURE — 2700000000 HC OXYGEN THERAPY PER DAY

## 2023-06-20 PROCEDURE — 6370000000 HC RX 637 (ALT 250 FOR IP): Performed by: FAMILY MEDICINE

## 2023-06-20 PROCEDURE — 2500000003 HC RX 250 WO HCPCS: Performed by: FAMILY MEDICINE

## 2023-06-20 PROCEDURE — 94640 AIRWAY INHALATION TREATMENT: CPT

## 2023-06-20 PROCEDURE — 94761 N-INVAS EAR/PLS OXIMETRY MLT: CPT

## 2023-06-20 PROCEDURE — 85027 COMPLETE CBC AUTOMATED: CPT

## 2023-06-20 RX ORDER — IPRATROPIUM BROMIDE AND ALBUTEROL SULFATE 2.5; .5 MG/3ML; MG/3ML
3 SOLUTION RESPIRATORY (INHALATION)
Qty: 360 ML | Refills: 1 | Status: SHIPPED | OUTPATIENT
Start: 2023-06-20

## 2023-06-20 RX ORDER — AMOXICILLIN AND CLAVULANATE POTASSIUM 875; 125 MG/1; MG/1
1 TABLET, FILM COATED ORAL 2 TIMES DAILY
Qty: 14 TABLET | Refills: 0 | Status: SHIPPED | OUTPATIENT
Start: 2023-06-20 | End: 2023-06-27

## 2023-06-20 RX ORDER — L. ACIDOPHILUS/PECTIN, CITRUS 25MM-100MG
1 TABLET ORAL 2 TIMES DAILY
Qty: 30 TABLET | Refills: 0 | Status: SHIPPED | OUTPATIENT
Start: 2023-06-20

## 2023-06-20 RX ORDER — METOLAZONE 5 MG/1
5 TABLET ORAL EVERY OTHER DAY
Qty: 30 TABLET | Refills: 0 | Status: SHIPPED | OUTPATIENT
Start: 2023-06-21

## 2023-06-20 RX ADMIN — SODIUM CHLORIDE, PRESERVATIVE FREE 10 ML: 5 INJECTION INTRAVENOUS at 10:39

## 2023-06-20 RX ADMIN — INSULIN LISPRO 4 UNITS: 100 INJECTION, SOLUTION INTRAVENOUS; SUBCUTANEOUS at 10:28

## 2023-06-20 RX ADMIN — ENOXAPARIN SODIUM 40 MG: 100 INJECTION SUBCUTANEOUS at 10:37

## 2023-06-20 RX ADMIN — Medication 800 UNITS: at 10:37

## 2023-06-20 RX ADMIN — INSULIN LISPRO 12 UNITS: 100 INJECTION, SOLUTION INTRAVENOUS; SUBCUTANEOUS at 13:58

## 2023-06-20 RX ADMIN — IPRATROPIUM BROMIDE AND ALBUTEROL SULFATE 1 DOSE: 2.5; .5 SOLUTION RESPIRATORY (INHALATION) at 08:24

## 2023-06-20 RX ADMIN — FLUTICASONE PROPIONATE 2 SPRAY: 50 SPRAY, METERED NASAL at 13:51

## 2023-06-20 RX ADMIN — PIPERACILLIN AND TAZOBACTAM 3375 MG: 3; .375 INJECTION, POWDER, LYOPHILIZED, FOR SOLUTION INTRAVENOUS at 03:23

## 2023-06-20 RX ADMIN — GABAPENTIN 300 MG: 300 CAPSULE ORAL at 18:30

## 2023-06-20 RX ADMIN — Medication 1 TABLET: at 10:28

## 2023-06-20 RX ADMIN — GABAPENTIN 300 MG: 300 CAPSULE ORAL at 13:58

## 2023-06-20 RX ADMIN — CETIRIZINE HYDROCHLORIDE 5 MG: 10 TABLET, FILM COATED ORAL at 10:38

## 2023-06-20 RX ADMIN — PIPERACILLIN AND TAZOBACTAM 3375 MG: 3; .375 INJECTION, POWDER, LYOPHILIZED, FOR SOLUTION INTRAVENOUS at 10:29

## 2023-06-20 RX ADMIN — GABAPENTIN 300 MG: 300 CAPSULE ORAL at 10:50

## 2023-06-20 RX ADMIN — OXYCODONE HYDROCHLORIDE 10 MG: 10 TABLET ORAL at 18:30

## 2023-06-20 RX ADMIN — BUMETANIDE 2 MG: 1 TABLET ORAL at 10:28

## 2023-06-20 RX ADMIN — OXYCODONE HYDROCHLORIDE 10 MG: 10 TABLET ORAL at 10:28

## 2023-06-20 RX ADMIN — OXYCODONE HYDROCHLORIDE AND ACETAMINOPHEN 1000 MG: 500 TABLET ORAL at 10:28

## 2023-06-20 RX ADMIN — COLCHICINE 0.6 MG: 0.6 TABLET, FILM COATED ORAL at 10:38

## 2023-06-20 ASSESSMENT — PAIN SCALES - GENERAL
PAINLEVEL_OUTOF10: 6
PAINLEVEL_OUTOF10: 7

## 2023-06-20 ASSESSMENT — ENCOUNTER SYMPTOMS
COUGH: 1
GASTROINTESTINAL NEGATIVE: 1
SHORTNESS OF BREATH: 1
EYES NEGATIVE: 1

## 2023-06-20 ASSESSMENT — PAIN DESCRIPTION - ORIENTATION: ORIENTATION: LEFT

## 2023-06-20 ASSESSMENT — PAIN DESCRIPTION - LOCATION
LOCATION: NECK;LEG
LOCATION: BACK;LEG;NECK

## 2023-06-20 NOTE — DISCHARGE SUMMARY
MV A Velocity 0.93 m/s    MV E Velocity 0.57 m/s    PV Max Velocity 0.8 m/s    PV Peak Gradient 3 mmHg    TAPSE 2.1 1.7 cm    TR Max Velocity 1.49 m/s    TR Peak Gradient 9 mmHg    Body Surface Area 2.86 m2    Fractional Shortening 2D 36 28 - 44 %    LVIDd Index 2.22 cm/m2    LVIDs Index 1.42 cm/m2    LV RWT Ratio 0.33     LV Mass 2D 270.5 (A) 88 - 224 g    LV Mass 2D Index 98.4 49 - 115 g/m2    MV E/A 0.61     E/E' Ratio (Averaged) 8.82     E/E' Lateral 9.50     E/E' Septal 8.14     LA Volume Index BP 35 (A) 16 - 34 ml/m2    LVOT Stroke Volume Index 21.7 mL/m2    LA Volume Index 2C 36 (A) 16 - 34 mL/m2    LA Volume Index 4C 33 16 - 34 mL/m2    LA Size Index 1.49 cm/m2    LA/AO Root Ratio 1.08     RA Volume Index A4C 24 mL/m2    Ao Root Index 1.38 cm/m2    Ascending Aorta Index 1.24 cm/m2    AV Velocity Ratio 0.71     LVOT:AV VTI Index 0.68     KRYSTIAN/BSA VTI 0.8 cm2/m2    KRYSTIAN/BSA Peak Velocity 0.8 cm2/m2   POCT Glucose    Collection Time: 06/19/23  4:03 PM   Result Value Ref Range    POC Glucose 225 (H) 65 - 100 mg/dL    Performed by: Jacobo Lopez    POCT Glucose    Collection Time: 06/19/23  7:21 PM   Result Value Ref Range    POC Glucose 257 (H) 65 - 100 mg/dL    Performed by: Daija Blount    CBC    Collection Time: 06/20/23  7:22 AM   Result Value Ref Range    WBC 5.8 4.1 - 11.1 K/uL    RBC 3.51 (L) 4.10 - 5.70 M/uL    Hemoglobin 10.6 (L) 12.1 - 17.0 g/dL    Hematocrit 32.2 (L) 36.6 - 50.3 %    MCV 91.7 80.0 - 99.0 FL    MCH 30.2 26.0 - 34.0 PG    MCHC 32.9 30.0 - 36.5 g/dL    RDW 13.7 11.5 - 14.5 %    Platelets 019 452 - 123 K/uL    MPV 11.0 8.9 - 12.9 FL    Nucleated RBCs 0.0 0.0  WBC    nRBC 0.00 0.00 - 0.01 K/uL   POCT Glucose    Collection Time: 06/20/23  8:38 AM   Result Value Ref Range    POC Glucose 221 (H) 65 - 100 mg/dL    Performed by:  Amanda Keller (Float Pool)    POCT Glucose    Collection Time: 06/20/23 12:34 PM   Result Value Ref Range    POC Glucose 334 (H) 65 - 100 mg/dL

## 2023-06-20 NOTE — DISCHARGE INSTRUCTIONS
Discharge Instructions       PATIENT ID: Juan J Manjarrez  MRN: [id number]   YOB: 1955    DATE OF ADMISSION: 6/14/2023  DATE OF DISCHARGE: 6/20/2023    PRIMARY CARE PROVIDER: [email or web address]     ATTENDING PHYSICIAN: Joaquina Eisenberg MD   DISCHARGING PROVIDER: Joaquina Eisenberg MD    To contact this individual call [phone] and ask the  to page. If unavailable, ask to be transferred the Adult Hospitalist Department. DISCHARGE DIAGNOSES CHF chronic venous stasis sleep apnea    CONSULTATIONS: [unfilled]      PROCEDURES/SURGERIES: * No surgery found *    PENDING TEST RESULTS:   At the time of discharge the following test results are still pending: None    FOLLOW UP APPOINTMENTS:   Joaquina Eisenberg, [address] [place] [address]  [phone]    Schedule an appointment as soon as possible for a visit in 1 week(s)         ADDITIONAL CARE RECOMMENDATIONS: Wound care    DIET: diabetic diet      ACTIVITY: activity as tolerated    Wound care: Wound Care Order:  submitted to Case Management. Please view [email or web address]     EQUIPMENT needed: ***      DISCHARGE MEDICATIONS:   See Medication Reconciliation Form    It is important that you take the medication exactly as they are prescribed. Keep your medication in the bottles provided by the pharmacist and keep a list of the medication names, dosages, and times to be taken in your wallet. Do not take other medications without consulting your doctor. NOTIFY YOUR PHYSICIAN FOR ANY OF THE FOLLOWING:   Fever over 101 degrees for 24 hours. Chest pain, shortness of breath, fever, chills, nausea, vomiting, diarrhea, change in mentation, falling, weakness, bleeding. Severe pain or pain not relieved by medications. Or, any other signs or symptoms that you may have questions about.       DISPOSITION:    Home With:   OT  PT  HH  RN       SNF/Inpatient Rehab/LTAC    Independent/assisted living    Hospice    Other:

## 2023-06-20 NOTE — CARE COORDINATION
Patient is clear to d/c from CM to [facility], room 208, nurse report can be called to [phone]. CM notified patient, patient agreeable, patient's family to transport to facility. Nurse notified.

## 2023-06-20 NOTE — PROGRESS NOTES
Discharge instructions complete, just need to print when patient ready to go. Primary nurse aware.
General Daily Progress Note      Patient Name:   Lizzie Lazo       YOB: 1955       Age:  75 y.o. Admit Date: 6/14/2023      Subjective:   CHIEF COMPLAINT: SOB and Left leg wound     HISTORY OF PRESENT ILLNESS:     Patient is a 75y.o. year old male with a PMH of COPD, CAD with pacemaker, HTN, gout, DM. Came to the ER yesterday directly from outpatient wound care visit, was advised to go to ER due to potential wound infection. Patient also noted increasing shortness of breath and weakness compared to normal. He has had the wound on his left leg for \"a few months\" per patient but noticed over the last 2-3 weeks that it was draining with a blue-green color. He states the wound is extremely painful and rates the pain a 10/10. Denies fevers/chills, chest pain, N/V. Patient was seen in the wound clinic yesterday and the physician sent to the ER for further work-up patient had recent wound cultures done which shows Pseudomonas        ED workup:    Hgb 11.1/HCT 34.8  CTA chest and CXR unremarkable     6/16:  Patient laying in bed using Cpap machine, states shortness of breath is much improved with it. Complaining of leg, back and shoulder pain, same as yesterday. Denies chest pain, weakness. Wound care, ID and pulmonology have seen patient. 6/17    Patient feeling much better today want to see a cardiology    6/18  Seen by the cardiology recommend echo  Patient denies any chest pain shortness of breath today  Seen by infectious disease recommend to continue Zosyn and transition on Levaquin on discharge likely tomorrow    6/19  Patient sitting in bed, admits shortness of breath with exertion, back, leg and neck pain. Pt is wearing 3L oxygen via NC. Denies chest pain, nausea/vomiting.        Objective:     Vitals:    06/19/23 0800   BP: 128/76   Pulse: 72   Resp: 17   Temp: 98.4 °F (36.9 °C)   SpO2: 95%        Recent Results (from the past 24 hour(s))   POCT Glucose    Collection Time:
PULMONARY NOTE  [facility]    Name: Christiane Valencia MRN: [id number]   : 1955 Hospital: [facility]   Date: 2023  Admission date: 2023 Hospital Day: 6       HPI:     Patient Active Problem List   Diagnosis    Onychomycosis    Non-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 ([address]    Idiopathic chronic venous hypertension of left lower extremity with ulcer ([address]    Ulcer of left foot, with fat layer exposed ([address]    Cellulitis    COVID-19    Venous ulcer ([address]    Hyperkeratosis    Localized edema    Type 2 diabetes mellitus with diabetic polyneuropathy, with long-term current use of insulin (HCC)    Cellulitis and abscess of right leg    Ulcer of right heel, with fat layer exposed ([address]    Calf ulcer, left, with fat layer exposed ([address]    Ankle ulcer, right, with fat layer exposed ([address]    Non-pressure chronic ulcer of other part of right lower leg with fat layer exposed ([address]    DEMARCO (acute kidney injury) ([address]    Back pain    Acute on chronic systolic CHF (congestive heart failure), NYHA class 2 ([address]    Shortness of breath    Acute on chronic congestive heart failure ([address]    Wound infection    Venous stasis ulcer ([address]             [x] High complexity decision making was performed  [x] See my orders for details      Subjective/Initial History:     I was asked by Saeid Meade MD to see Christiane Valencia  a 75 y.o.    male in consultation     Excerpts from admission 2023 or consult notes as follows:   60-year-old male came in with other shortness of breath dyspnea pain in the legs because of the leg wound he has significant past medical history of chronic hypoxic hypercapnic respiratory failure on home noninvasive ventilator trilogy also on oxygen nasal cannula during the daytime he has leg wound now it is getting worse more pain and also swelling of the lower extremities worse she came to the hospital he was at the wound care
Voice mail left with [facility].   Call back left to give report,
Wound dressing changed minimum discharge present.
Culture       Moderate Pseudomonas aeruginosa                  Moderate Klebsiella pneumoniae                  Moderate Enterobacter cloacae complex                  Heavy Mixed skin ady isolated          Susceptibility        Pseudomonas aeruginosa      BACTERIAL SUSCEPTIBILITY PANEL POWER      amikacin <=2 ug/mL Sensitive      cefepime <=1 ug/mL Sensitive      cefTAZidime 4 ug/mL Sensitive      ciprofloxacin <=0.25 ug/mL Sensitive      gentamicin <=1 ug/mL Sensitive      levofloxacin 0.5 ug/mL Sensitive      meropenem <=0.25 ug/mL Sensitive      piperacillin-tazobactam <=4 ug/mL Sensitive      tobramycin <=1 ug/mL Sensitive                       Susceptibility        Klebsiella pneumoniae      BACTERIAL SUSCEPTIBILITY PANEL POWER      amikacin <=2 ug/mL Sensitive      ampicillin 16 ug/mL Resistant      ampicillin-sulbactam 4 ug/mL Sensitive      cefepime <=1 ug/mL Sensitive      cefOXitin <=4 ug/mL Sensitive      cefTAZidime <=1 ug/mL Sensitive      cefTRIAXone <=1 ug/mL Sensitive      ciprofloxacin <=0.25 ug/mL Sensitive      gentamicin <=1 ug/mL Sensitive      levofloxacin <=0.12 ug/mL Sensitive      meropenem <=0.25 ug/mL Sensitive      piperacillin-tazobactam <=4 ug/mL Sensitive      tobramycin <=1 ug/mL Sensitive      trimethoprim-sulfamethoxazole <=20 ug/mL Sensitive                       Susceptibility        Enterobacter cloacae complex      BACTERIAL SUSCEPTIBILITY PANEL POWER      amikacin <=2 ug/mL Sensitive      ceFAZolin >=64 ug/mL Resistant      cefepime <=1 ug/mL Sensitive      cefOXitin >=64 ug/mL Resistant      cefTAZidime <=1 ug/mL Sensitive      cefTRIAXone <=1 ug/mL Sensitive      ciprofloxacin <=0.25 ug/mL Sensitive      gentamicin <=1 ug/mL Sensitive      levofloxacin <=0.12 ug/mL Sensitive      meropenem <=0.25 ug/mL Sensitive      tobramycin <=1 ug/mL Sensitive      trimethoprim-sulfamethoxazole <=20 ug/mL Sensitive                                    Diagnostic   CTA CHEST W WO
aeruginosa                  Moderate Klebsiella pneumoniae                  Moderate Enterobacter cloacae complex                  Heavy Mixed skin ady isolated          Susceptibility        Pseudomonas aeruginosa      BACTERIAL SUSCEPTIBILITY PANEL POWER      amikacin <=2 ug/mL Sensitive      cefepime <=1 ug/mL Sensitive      cefTAZidime 4 ug/mL Sensitive      ciprofloxacin <=0.25 ug/mL Sensitive      gentamicin <=1 ug/mL Sensitive      levofloxacin 0.5 ug/mL Sensitive      meropenem <=0.25 ug/mL Sensitive      piperacillin-tazobactam <=4 ug/mL Sensitive      tobramycin <=1 ug/mL Sensitive                       Susceptibility        Klebsiella pneumoniae      BACTERIAL SUSCEPTIBILITY PANEL POWER      amikacin <=2 ug/mL Sensitive      ampicillin 16 ug/mL Resistant      ampicillin-sulbactam 4 ug/mL Sensitive      cefepime <=1 ug/mL Sensitive      cefOXitin <=4 ug/mL Sensitive      cefTAZidime <=1 ug/mL Sensitive      cefTRIAXone <=1 ug/mL Sensitive      ciprofloxacin <=0.25 ug/mL Sensitive      gentamicin <=1 ug/mL Sensitive      levofloxacin <=0.12 ug/mL Sensitive      meropenem <=0.25 ug/mL Sensitive      piperacillin-tazobactam <=4 ug/mL Sensitive      tobramycin <=1 ug/mL Sensitive      trimethoprim-sulfamethoxazole <=20 ug/mL Sensitive                       Susceptibility        Enterobacter cloacae complex      BACTERIAL SUSCEPTIBILITY PANEL POWER      amikacin <=2 ug/mL Sensitive      ceFAZolin >=64 ug/mL Resistant      cefepime <=1 ug/mL Sensitive      cefOXitin >=64 ug/mL Resistant      cefTAZidime <=1 ug/mL Sensitive      cefTRIAXone <=1 ug/mL Sensitive      ciprofloxacin <=0.25 ug/mL Sensitive      gentamicin <=1 ug/mL Sensitive      levofloxacin <=0.12 ug/mL Sensitive      meropenem <=0.25 ug/mL Sensitive      tobramycin <=1 ug/mL Sensitive      trimethoprim-sulfamethoxazole <=20 ug/mL Sensitive                                    Imaging:  CTA CHEST W WO CONTRAST    Result Date: 6/14/2023  Clinical history:
Units, 0-16 Units, SubCUTAneous, TID WC, Ed Pino MD, 4 Units at 06/19/23 0958    insulin lispro (HUMALOG) injection vial 0-4 Units, 0-4 Units, SubCUTAneous, Nightly, Ed Pino MD, 0 Units at 06/15/23 2133    glucose chewable tablet 16 g, 4 tablet, Oral, PRN, Kadie Pino MD    dextrose bolus 10% 125 mL, 125 mL, IntraVENous, PRN **OR** dextrose bolus 10% 250 mL, 250 mL, IntraVENous, PRN, Kadie Pino MD    glucagon injection 1 mg, 1 mg, SubCUTAneous, PRN, Kadie Pino MD    dextrose 10 % infusion, , IntraVENous, Continuous PRN, Kadie Pino MD    sodium chloride flush 0.9 % injection 5-40 mL, 5-40 mL, IntraVENous, 2 times per day, Donna Haywood MD, 10 mL at 06/19/23 1004    sodium chloride flush 0.9 % injection 5-40 mL, 5-40 mL, IntraVENous, PRN, Donna Haywood MD    0.9 % sodium chloride infusion, , IntraVENous, PRN, Kadie Pino MD    enoxaparin (LOVENOX) injection 40 mg, 40 mg, SubCUTAneous, BID, Ed Pino MD, 40 mg at 06/19/23 0959    ondansetron (ZOFRAN-ODT) disintegrating tablet 4 mg, 4 mg, Oral, Q8H PRN **OR** ondansetron (ZOFRAN) injection 4 mg, 4 mg, IntraVENous, Q6H PRN, Kadie Pino MD    polyethylene glycol (GLYCOLAX) packet 17 g, 17 g, Oral, Daily PRN, Kadie Pino MD    acetaminophen (TYLENOL) tablet 650 mg, 650 mg, Oral, Q6H PRN **OR** acetaminophen (TYLENOL) suppository 650 mg, 650 mg, Rectal, Q6H PRN, Kadie Pino MD    [COMPLETED] piperacillin-tazobactam (ZOSYN) 4,500 mg in sodium chloride 0.9 % 100 mL IVPB (mini-bag), 4,500 mg, IntraVENous, Once, Stopped at 06/14/23 2265 **FOLLOWED BY** piperacillin-tazobactam (ZOSYN) 3,375 mg in sodium chloride 0.9 % 50 mL IVPB (mini-bag), 3,375 mg, IntraVENous, Q8H, Donna Haywood MD, Stopped at 06/19/23 1005
MD, 1 Dose at 06/20/23 0824    oxyCODONE HCl (OXY-IR) immediate release tablet 10 mg, 10 mg, Oral, Q6H PRN, Phong Wong MD, 10 mg at 06/20/23 1028    acidophilus/citrus pectin 1 tablet, 1 tablet, Oral, BID, Phong Wong MD, 1 tablet at 06/20/23 1028    ascorbic acid (VITAMIN C) tablet 1,000 mg, 1,000 mg, Oral, BID, Ed Pino MD, 1,000 mg at 06/20/23 1028    atorvastatin (LIPITOR) tablet 40 mg, 40 mg, Oral, Nightly, Ed Pino MD, 40 mg at 06/19/23 2030    bumetanide (BUMEX) tablet 2 mg, 2 mg, Oral, BID, Ed Pino MD, 2 mg at 06/20/23 1028    colchicine (COLCRYS) tablet 0.6 mg, 0.6 mg, Oral, Daily, Hayder Pino MD, 0.6 mg at 06/20/23 1038    gabapentin (NEURONTIN) capsule 300 mg, 300 mg, Oral, 4x Daily, Ed Pino MD, 300 mg at 06/20/23 1050    vitamin E capsule 800 Units, 800 Units, Oral, BID, Phong Wong MD, 800 Units at 06/20/23 1037    metOLazone (ZAROXOLYN) tablet 5 mg, 5 mg, Oral, Every Other Day, Phong oWng MD, 5 mg at 06/19/23 0955    fluticasone (FLONASE) 50 MCG/ACT nasal spray 2 spray, 2 spray, Nasal, Daily, Phong Wong MD, 2 spray at 06/19/23 0959    cetirizine (ZYRTEC) tablet 5 mg, 5 mg, Oral, Daily, Ed Pino MD, 5 mg at 06/20/23 1038    insulin lispro (HUMALOG) injection vial 0-16 Units, 0-16 Units, SubCUTAneous, TID WC, Ed Pino MD, 4 Units at 06/20/23 1028    insulin lispro (HUMALOG) injection vial 0-4 Units, 0-4 Units, SubCUTAneous, Nightly, Ed Pino MD, 0 Units at 06/15/23 8640    glucose chewable tablet 16 g, 4 tablet, Oral, PRN, Hayder Pino MD    dextrose bolus 10% 125 mL, 125 mL, IntraVENous, PRN **OR** dextrose bolus 10% 250 mL, 250 mL, IntraVENous, PRN, Hayder Pino MD    glucagon injection 1 mg, 1 mg, SubCUTAneous, PRN, Hayder Pino MD    dextrose 10 % infusion, , IntraVENous, Continuous PRN, Hayder Pino MD    sodium chloride flush 0.9 % injection 5-40 mL, 5-40 mL, IntraVENous, 2 times per day,

## 2023-06-22 LAB
BACTERIA SPEC CULT: NORMAL
BACTERIA SPEC CULT: NORMAL
Lab: NORMAL
Lab: NORMAL

## 2023-06-23 ENCOUNTER — HOSPITAL ENCOUNTER (OUTPATIENT)
Facility: HOSPITAL | Age: 68
Setting detail: SPECIMEN
Discharge: HOME | End: 2023-06-26

## 2023-06-23 LAB
ANION GAP SERPL CALC-SCNC: 10 MMOL/L (ref 5–15)
BASOPHILS # BLD: 0 K/UL (ref 0–0.1)
BASOPHILS NFR BLD: 0 % (ref 0–1)
BUN SERPL-MCNC: 54 MG/DL (ref 6–20)
BUN/CREAT SERPL: 33 (ref 12–20)
CA-I BLD-MCNC: 8.8 MG/DL (ref 8.5–10.1)
CHLORIDE SERPL-SCNC: 95 MMOL/L (ref 97–108)
CO2 SERPL-SCNC: 32 MMOL/L (ref 21–32)
CREAT SERPL-MCNC: 1.64 MG/DL (ref 0.7–1.3)
DIFFERENTIAL METHOD BLD: ABNORMAL
EOSINOPHIL # BLD: 0.3 K/UL (ref 0–0.4)
EOSINOPHIL NFR BLD: 5 % (ref 0–7)
ERYTHROCYTE [DISTWIDTH] IN BLOOD BY AUTOMATED COUNT: 13.7 % (ref 11.5–14.5)
GLUCOSE SERPL-MCNC: 383 MG/DL (ref 65–100)
HCT VFR BLD AUTO: 32 % (ref 36.6–50.3)
HGB BLD-MCNC: 10.7 G/DL (ref 12.1–17)
IMM GRANULOCYTES # BLD AUTO: 0 K/UL (ref 0–0.04)
IMM GRANULOCYTES NFR BLD AUTO: 0 % (ref 0–0.5)
LYMPHOCYTES # BLD: 1.7 K/UL (ref 0.8–3.5)
LYMPHOCYTES NFR BLD: 24 % (ref 12–49)
MCH RBC QN AUTO: 30.6 PG (ref 26–34)
MCHC RBC AUTO-ENTMCNC: 33.4 G/DL (ref 30–36.5)
MCV RBC AUTO: 91.4 FL (ref 80–99)
MONOCYTES # BLD: 0.6 K/UL (ref 0–1)
MONOCYTES NFR BLD: 8 % (ref 5–13)
NEUTS SEG # BLD: 4.4 K/UL (ref 1.8–8)
NEUTS SEG NFR BLD: 63 % (ref 32–75)
NRBC # BLD: 0 K/UL (ref 0–0.01)
NRBC BLD-RTO: 0 PER 100 WBC
PLATELET # BLD AUTO: 256 K/UL (ref 150–400)
PMV BLD AUTO: 11.6 FL (ref 8.9–12.9)
POTASSIUM SERPL-SCNC: 3.4 MMOL/L (ref 3.5–5.1)
RBC # BLD AUTO: 3.5 M/UL (ref 4.1–5.7)
SODIUM SERPL-SCNC: 137 MMOL/L (ref 136–145)
WBC # BLD AUTO: 7.1 K/UL (ref 4.1–11.1)

## 2023-06-23 PROCEDURE — 85025 COMPLETE CBC W/AUTO DIFF WBC: CPT

## 2023-06-23 PROCEDURE — 80048 BASIC METABOLIC PNL TOTAL CA: CPT

## 2023-06-23 PROCEDURE — 83036 HEMOGLOBIN GLYCOSYLATED A1C: CPT

## 2023-06-24 LAB
EST. AVERAGE GLUCOSE BLD GHB EST-MCNC: 146 MG/DL
HBA1C MFR BLD: 6.7 % (ref 4–5.6)

## 2023-06-26 ENCOUNTER — HOSPITAL ENCOUNTER (OUTPATIENT)
Facility: HOSPITAL | Age: 68
Setting detail: SPECIMEN
Discharge: HOME | End: 2023-06-29

## 2023-06-26 LAB
ANION GAP SERPL CALC-SCNC: 12 MMOL/L (ref 5–15)
BUN SERPL-MCNC: 81 MG/DL (ref 6–20)
BUN/CREAT SERPL: 45 (ref 12–20)
CA-I BLD-MCNC: 9.6 MG/DL (ref 8.5–10.1)
CHLORIDE SERPL-SCNC: 90 MMOL/L (ref 97–108)
CO2 SERPL-SCNC: 34 MMOL/L (ref 21–32)
CREAT SERPL-MCNC: 1.79 MG/DL (ref 0.7–1.3)
GLUCOSE SERPL-MCNC: 369 MG/DL (ref 65–100)
POTASSIUM SERPL-SCNC: 2.9 MMOL/L (ref 3.5–5.1)
SODIUM SERPL-SCNC: 136 MMOL/L (ref 136–145)

## 2023-06-26 PROCEDURE — 85025 COMPLETE CBC W/AUTO DIFF WBC: CPT

## 2023-06-26 PROCEDURE — 80048 BASIC METABOLIC PNL TOTAL CA: CPT

## 2023-06-27 ENCOUNTER — HOSPITAL ENCOUNTER (OUTPATIENT)
Facility: HOSPITAL | Age: 68
Setting detail: SPECIMEN
Discharge: HOME | End: 2023-06-30

## 2023-06-27 LAB
ANION GAP SERPL CALC-SCNC: 11 MMOL/L (ref 5–15)
BASOPHILS # BLD: 0 K/UL (ref 0–0.1)
BASOPHILS NFR BLD: 0 % (ref 0–1)
BUN SERPL-MCNC: 80 MG/DL (ref 6–20)
BUN/CREAT SERPL: 47 (ref 12–20)
CA-I BLD-MCNC: 9.2 MG/DL (ref 8.5–10.1)
CHLORIDE SERPL-SCNC: 90 MMOL/L (ref 97–108)
CO2 SERPL-SCNC: 33 MMOL/L (ref 21–32)
CREAT SERPL-MCNC: 1.69 MG/DL (ref 0.7–1.3)
DIFFERENTIAL METHOD BLD: ABNORMAL
EOSINOPHIL # BLD: 0.4 K/UL (ref 0–0.4)
EOSINOPHIL NFR BLD: 5 % (ref 0–7)
ERYTHROCYTE [DISTWIDTH] IN BLOOD BY AUTOMATED COUNT: 14.1 % (ref 11.5–14.5)
GLUCOSE SERPL-MCNC: 367 MG/DL (ref 65–100)
HCT VFR BLD AUTO: 31.7 % (ref 36.6–50.3)
HGB BLD-MCNC: 10.9 G/DL (ref 12.1–17)
IMM GRANULOCYTES # BLD AUTO: 0 K/UL (ref 0–0.04)
IMM GRANULOCYTES NFR BLD AUTO: 0 % (ref 0–0.5)
LYMPHOCYTES # BLD: 2 K/UL (ref 0.8–3.5)
LYMPHOCYTES NFR BLD: 24 % (ref 12–49)
MCH RBC QN AUTO: 30.9 PG (ref 26–34)
MCHC RBC AUTO-ENTMCNC: 34.4 G/DL (ref 30–36.5)
MCV RBC AUTO: 89.8 FL (ref 80–99)
MONOCYTES # BLD: 0.8 K/UL (ref 0–1)
MONOCYTES NFR BLD: 9 % (ref 5–13)
NEUTS SEG # BLD: 5.4 K/UL (ref 1.8–8)
NEUTS SEG NFR BLD: 62 % (ref 32–75)
NRBC # BLD: 0.02 K/UL (ref 0–0.01)
NRBC BLD-RTO: 0.2 PER 100 WBC
PLATELET # BLD AUTO: 273 K/UL (ref 150–400)
PMV BLD AUTO: 11.9 FL (ref 8.9–12.9)
POTASSIUM SERPL-SCNC: 2.8 MMOL/L (ref 3.5–5.1)
POTASSIUM SERPL-SCNC: 4 MMOL/L (ref 3.5–5.1)
RBC # BLD AUTO: 3.53 M/UL (ref 4.1–5.7)
SODIUM SERPL-SCNC: 134 MMOL/L (ref 136–145)
WBC # BLD AUTO: 8.6 K/UL (ref 4.1–11.1)

## 2023-06-27 PROCEDURE — 84132 ASSAY OF SERUM POTASSIUM: CPT

## 2023-07-12 ENCOUNTER — HOSPITAL ENCOUNTER (OUTPATIENT)
Facility: HOSPITAL | Age: 68
Discharge: HOME | End: 2023-07-12
Payer: MEDICARE

## 2023-07-12 VITALS
HEART RATE: 78 BPM | SYSTOLIC BLOOD PRESSURE: 132 MMHG | RESPIRATION RATE: 20 BRPM | DIASTOLIC BLOOD PRESSURE: 67 MMHG | TEMPERATURE: 99 F

## 2023-07-12 DIAGNOSIS — L97.822 NON-PRESSURE CHRONIC ULCER OF OTHER PART OF LEFT LOWER LEG WITH FAT LAYER EXPOSED (HCC): Primary | ICD-10-CM

## 2023-07-12 PROCEDURE — 29581 APPL MULTLAYER CMPRN SYS LEG: CPT

## 2023-07-12 RX ORDER — SODIUM CHLOR/HYPOCHLOROUS ACID 0.033 %
SOLUTION, IRRIGATION IRRIGATION ONCE
OUTPATIENT
Start: 2023-07-12 | End: 2023-07-12

## 2023-07-12 RX ORDER — LIDOCAINE HYDROCHLORIDE 20 MG/ML
JELLY TOPICAL ONCE
OUTPATIENT
Start: 2023-07-12 | End: 2023-07-12

## 2023-07-12 RX ORDER — POTASSIUM CHLORIDE 750 MG/1
10 CAPSULE, EXTENDED RELEASE ORAL 4 TIMES DAILY
COMMUNITY

## 2023-07-12 ASSESSMENT — PAIN DESCRIPTION - DESCRIPTORS: DESCRIPTORS: ACHING

## 2023-07-12 ASSESSMENT — PAIN DESCRIPTION - LOCATION: LOCATION: LEG

## 2023-07-12 ASSESSMENT — PAIN SCALES - GENERAL: PAINLEVEL_OUTOF10: 7

## 2023-07-12 ASSESSMENT — PAIN DESCRIPTION - ORIENTATION: ORIENTATION: LEFT

## 2023-07-12 NOTE — DISCHARGE INSTRUCTIONS
Wound Clinic Physician Orders and Discharge Instructions  [address] [place] [address] S. 709 [facility], [address] [place] [address] Way  Telephone: [phone]    NAME:  Taryn Leonardo OF BIRTH:  1955  MEDICAL RECORD NUMBER:  [id number]  DATE:  7/12/2023      Return Appointment:  [] Dressing Supply Provider:   [x] Home Healthcare: Andreea Maria  [x] Return Appointment: 1 Week(s)  [] Nurse Visit:     [] Discharge from [facility]: [] Healed        [] Refer to Provider:         [] Consult    Follow-up Information:  [] Ordered Tests:   [] Referrals:   [] Rx:   [] Other:       Wound Cleansing:   Do not scrub or use excessive force. Cleanse wound prior to applying a clean dressing with:  [x] Normal Saline   [] Keep Wound Dry in Shower     [] Wound Cleanser   [x] Cleanse wound with Mild Soap & Water    [] Other:       Topical Treatments:  Do not apply lotions, creams, or ointments to wound bed unless directed. [] Apply moisturizing lotion to skin surrounding the wound prior to dressing change.  [] Apply antifungal ointment to skin surrounding the wound prior to dressing change.  [] Apply thin film of moisture barrier ointment to skin immediately around wound.   [] Apply Betadine to skin immediately around wound   [] Other:      Dressings:           Wound Location   Left Leg  [x] Apply Primary Dressing:       [] MediHoney Gel [] MediHoney Alginate  [x] Calcium Alginate with Silver   [] Calcium Alginate without silver   [] Collagen with silver [] Collagen without Silver    [] Santyl with moist saline gauze     [] Hydrofera Blue (cut to size and moistened with normal saline)  [] Hydrofera Blue Ready (cut to size)      [] Normal Saline wet to dry  [] Betadine wet to dry    [] Hydrogel  [] Mepitel     [] Bactroban/Mupirocin   [] Iodoform Packing Strip [] Plain Packing Strip   [] Skin Sub:   [] Other:  xeroform double drawtex     [x] Cover and Secure with:     [x] Gauze [] Aris []

## 2023-07-12 NOTE — WOUND CARE
Multilayer Compression Wrap   (Not Unna) Below the Knee    NAME:  Valeria Rossi OF BIRTH:  1955  MEDICAL RECORD NUMBER:  [id number]  DATE:  7/12/2023    Multilayer compression wrap: Removed old Multilayer wrap if indicated and wash leg with mild soap/water. Applied primary and secondary dressing as ordered. Applied multilayered dressing below the knee to left lower leg. Instructed patient/caregiver not to remove dressing and to keep it clean and dry. Instructed patient/caregiver on complications to report to provider, such as pain, numbness in toes, heavy drainage, and slippage of dressing. Instructed patient on purpose of compression dressing and on activity and exercise recommendations.       Electronically signed by Sandhya Cameron RN on 7/12/2023 at 2:46 PM
[facility]   Medical Staff Progress Note     [address] RECORD NUMBER:  [id number]  AGE: 75 y.o. GENDER: male  : 1955  EPISODE DATE:  2023    Chief complaint and reason for visit:   Patient was hospitalized for 6 days, extensive diuresis with loss of 40 pound weight, glucose much better controlled. No fever  Chief Complaint   Patient presents with    Wound Check     L leg      Patient presenting for follow up evaluation of wound(s) per chief complaint. Subjective: Symptoms, wound related issues, or other pertinent wound history since last visit: breathing much improved     Wound 05/10/23 Leg Left #1 circ (Active)   Wound Image   23 142   Wound Etiology Venous 23 142   Dressing Status Clean;Dry; Intact 23 142   Wound Cleansed Soap and water 23   Dressing/Treatment Xeroform;ABD;Roll gauze 06/15/23 1157   Wound Length (cm) 15 cm 23 142   Wound Width (cm) 17 cm 23 142   Wound Depth (cm) 0.1 cm 23 142   Wound Surface Area (cm^2) 255 cm^2 23 142   Change in Wound Size % (l*w) 65.31 23 142   Wound Volume (cm^3) 25.5 cm^3 23 142   Wound Healing % 83 23 142   Post-Procedure Length (cm) 17 cm 23 142   Post-Procedure Width (cm) 25 cm 23 142   Post-Procedure Depth (cm) 0.2 cm 23 142   Post-Procedure Surface Area (cm^2) 425 cm^2 23 1427   Post-Procedure Volume (cm^3) 85 cm^3 23 142   Wound Assessment Slough;Granulation tissue 23 142   Drainage Amount Moderate 23 142   Drainage Description Serosanguinous 23   Odor None 23   Emy-wound Assessment Fragile 23 142   Margins Attached edges 23   Wound Thickness Description not for Pressure Injury Full thickness 23   Number of days: 63          Procedures done during this encounter:   No
with restrictions     Physician:  [x] Dr. Spring Gotti  [] Dr. Thierry Garcia  [] Dr. Vladimir Pike      Nurse Case Manger:  Jackeline Head Information: Should you experience any significant changes in your wound(s) or have questions about your wound care, please contact the [facility] at [phone]. Our hours are Monday - Friday 8am - 4:30pm, closed all major holidays. If you need help with your wound outside these hours and cannot wait until we are again available, contact your PCP or go to the hospital emergency room. PLEASE NOTE: IF YOU ARE UNABLE TO OBTAIN WOUND SUPPLIES, CONTINUE TO USE THE SUPPLIES YOU HAVE AVAILABLE UNTIL YOU ARE ABLE TO REACH US. IT IS MOST IMPORTANT TO KEEP THE WOUND COVERED AT ALL TIMES.

## 2023-07-25 ENCOUNTER — HOSPITAL ENCOUNTER (OUTPATIENT)
Facility: HOSPITAL | Age: 68
Discharge: HOME | End: 2023-07-25
Payer: MEDICARE

## 2023-07-25 VITALS
TEMPERATURE: 98.3 F | OXYGEN SATURATION: 98 % | HEART RATE: 122 BPM | DIASTOLIC BLOOD PRESSURE: 70 MMHG | RESPIRATION RATE: 20 BRPM | SYSTOLIC BLOOD PRESSURE: 123 MMHG

## 2023-07-25 DIAGNOSIS — L97.822 NON-PRESSURE CHRONIC ULCER OF OTHER PART OF LEFT LOWER LEG WITH FAT LAYER EXPOSED (HCC): Primary | ICD-10-CM

## 2023-07-25 PROCEDURE — 87077 CULTURE AEROBIC IDENTIFY: CPT

## 2023-07-25 PROCEDURE — 87186 SC STD MICRODIL/AGAR DIL: CPT

## 2023-07-25 PROCEDURE — 29581 APPL MULTLAYER CMPRN SYS LEG: CPT

## 2023-07-25 PROCEDURE — 87205 SMEAR GRAM STAIN: CPT

## 2023-07-25 PROCEDURE — 87070 CULTURE OTHR SPECIMN AEROBIC: CPT

## 2023-07-25 RX ORDER — CIPROFLOXACIN 500 MG/1
500 TABLET, FILM COATED ORAL 2 TIMES DAILY
Qty: 20 TABLET | Refills: 0 | Status: SHIPPED | OUTPATIENT
Start: 2023-07-25 | End: 2023-08-04

## 2023-07-25 RX ORDER — LIDOCAINE HYDROCHLORIDE 20 MG/ML
JELLY TOPICAL ONCE
OUTPATIENT
Start: 2023-07-25 | End: 2023-07-25

## 2023-07-25 RX ORDER — SODIUM CHLOR/HYPOCHLOROUS ACID 0.033 %
SOLUTION, IRRIGATION IRRIGATION ONCE
OUTPATIENT
Start: 2023-07-25 | End: 2023-07-25

## 2023-07-25 ASSESSMENT — PAIN DESCRIPTION - LOCATION: LOCATION: LEG

## 2023-07-25 ASSESSMENT — PAIN SCALES - GENERAL: PAINLEVEL_OUTOF10: 7

## 2023-07-25 ASSESSMENT — PAIN DESCRIPTION - DESCRIPTORS: DESCRIPTORS: ACHING

## 2023-07-25 ASSESSMENT — PAIN DESCRIPTION - ORIENTATION: ORIENTATION: LEFT

## 2023-07-25 NOTE — DISCHARGE INSTRUCTIONS
Serenity Rendon RN  Registered Nurse  Wound Care      Signed  Date of Service:  7/25/2023  2:45 PM     [facility] Physician Orders and Discharge Instructions  [address] [place] [address] S. 709 [facility], [address] [place] [address]  Telephone: [phone]     NAME:  Vikash Hollis OF BIRTH:  1955  MEDICAL RECORD NUMBER:  [id number]  DATE:  7/25/2023        Return Appointment:  [] Dressing Supply Provider:   [x] Home Healthcare: Kianna Layton  [x] Return Appointment: 1 Week(s)  [] Nurse Visit:      [] Discharge from [facility]: [] Healed        [] Refer to Provider:         [] Consult     Follow-up Information:  [x] Ordered Tests: CULTURE SENT OUT  [] Referrals:   [x] Rx: CIPRO TO PHARMACY  [] Other:         Wound Cleansing:   Do not scrub or use excessive force. Cleanse wound prior to applying a clean dressing with:  [x] Normal Saline OR    [] Keep Wound Dry in Shower     [] Wound Cleanser   [x] Cleanse wound with Mild Soap & Water    [] Other:        Topical Treatments:  Do not apply lotions, creams, or ointments to wound bed unless directed. [] Apply moisturizing lotion to skin surrounding the wound prior to dressing change.  [] Apply antifungal ointment to skin surrounding the wound prior to dressing change.  [] Apply thin film of moisture barrier ointment to skin immediately around wound.   [] Apply Betadine to skin immediately around wound   [] Other:                 Dressings:                  Wound Location       Left Leg  [x] Apply Primary Dressing:                                          [] MediHoney Gel      [] MediHoney Alginate  [x] Calcium Alginate with Silver - SILVERCEL 2ND [] Calcium Alginate without silver              [] Collagen with silver            [] Collagen without Silver    [] Santyl with moist saline gauze                [] Hydrofera Blue

## 2023-07-25 NOTE — WOUND CARE
Multilayer Compression Wrap   (Not Unna) Below the Knee    NAME:  Kera Hobbs OF BIRTH:  1955  MEDICAL RECORD NUMBER:  [id number]  DATE:  7/25/2023    Multilayer compression wrap: Removed old Multilayer wrap if indicated and wash leg with mild soap/water. Applied moisturizing agent to dry skin as needed. Applied primary and secondary dressing as ordered. Applied multilayered dressing below the knee to left lower leg. Instructed patient/caregiver not to remove dressing and to keep it clean and dry. Instructed patient/caregiver on complications to report to provider, such as pain, numbness in toes, heavy drainage, and slippage of dressing. Instructed patient on purpose of compression dressing and on activity and exercise recommendations.       Electronically signed by Ana Lilia Abdul LPN on 8/57/0576 at 9:12 PM
[facility]   Medical Staff Progress Note     [address] RECORD NUMBER:  [id number]  AGE: 75 y.o. GENDER: male  : 1955  EPISODE DATE:  2023    Chief complaint and reason for visit:   LLE wound  Chief Complaint   Patient presents with    Wound Check     Left leg      Patient presenting for follow up evaluation of wound(s) per chief complaint. Subjective: Symptoms, wound related issues, or other pertinent wound history since last visit:   Patient was seen by [facility] yesterday, concern RE infection expressed. Patient given an add-on appt for today. He reports increased drainage, increased discomfort, no fever     Wound 05/10/23 Leg Left #1 circ (Active)   Wound Image   23 145   Wound Etiology Venous 23   Dressing Status Clean;Dry; Intact 23 145   Wound Cleansed Soap and water 23 1454   Wound Length (cm) 14.5 cm 23 1454   Wound Width (cm) 14.5 cm 23 1454   Wound Depth (cm) 0.2 cm 23 1454   Wound Surface Area (cm^2) 210.25 cm^2 23 1454   Change in Wound Size % (l*w) 71.39 23 1454   Wound Volume (cm^3) 42.05 cm^3 23 1454   Wound Healing % 71 23 1454   Post-Procedure Length (cm) 17 cm 23 1427   Post-Procedure Width (cm) 25 cm 23 1427   Post-Procedure Depth (cm) 0.2 cm 23 1427   Post-Procedure Surface Area (cm^2) 425 cm^2 23 1427   Post-Procedure Volume (cm^3) 85 cm^3 23 1427   Wound Assessment Slough;Granulation tissue 23 145   Drainage Amount Moderate 23 1454   Drainage Description Serosanguinous 23 1454   Odor None 23   Emy-wound Assessment Maceration;Fragile 23 145   Margins Attached edges 234   Wound Thickness Description not for Pressure Injury Full thickness 23 4702   Number of days: 76          Procedures done during this encounter:   Wound culture    TIME: E/M
duty work                                     [] Return to work with restrictions     Physician:  [x] Dr. Wendy Conley  [] Dr. Reva Guerrero  [] Dr. Linda Lennon      Nurse Case Manger:  Sherrie Thibodeaux Information: Should you experience any significant changes in your wound(s) or have questions about your wound care, please contact the [facility] at [phone]. Our hours are Monday - Friday 8am - 4:30pm, closed all major holidays. If you need help with your wound outside these hours and cannot wait until we are again available, contact your PCP or go to the hospital emergency room. PLEASE NOTE: IF YOU ARE UNABLE TO OBTAIN WOUND SUPPLIES, CONTINUE TO USE THE SUPPLIES YOU HAVE AVAILABLE UNTIL YOU ARE ABLE TO REACH US. IT IS MOST IMPORTANT TO KEEP THE WOUND COVERED AT ALL TIMES.

## 2023-07-29 LAB
BACTERIA SPEC CULT: ABNORMAL
GRAM STN SPEC: ABNORMAL
Lab: ABNORMAL

## 2023-08-01 ENCOUNTER — HOSPITAL ENCOUNTER (OUTPATIENT)
Facility: HOSPITAL | Age: 68
Discharge: HOME | End: 2023-08-01
Payer: MEDICARE

## 2023-08-01 VITALS
SYSTOLIC BLOOD PRESSURE: 123 MMHG | TEMPERATURE: 98.4 F | DIASTOLIC BLOOD PRESSURE: 74 MMHG | RESPIRATION RATE: 20 BRPM | OXYGEN SATURATION: 98 % | HEART RATE: 99 BPM

## 2023-08-01 DIAGNOSIS — L97.822 NON-PRESSURE CHRONIC ULCER OF OTHER PART OF LEFT LOWER LEG WITH FAT LAYER EXPOSED (HCC): Primary | ICD-10-CM

## 2023-08-01 DIAGNOSIS — L97.222 CALF ULCER, LEFT, WITH FAT LAYER EXPOSED (HCC): ICD-10-CM

## 2023-08-01 PROCEDURE — 11045 DBRDMT SUBQ TISS EACH ADDL: CPT

## 2023-08-01 PROCEDURE — 11042 DBRDMT SUBQ TIS 1ST 20SQCM/<: CPT

## 2023-08-01 RX ORDER — LIDOCAINE HYDROCHLORIDE 20 MG/ML
JELLY TOPICAL ONCE
OUTPATIENT
Start: 2023-08-01 | End: 2023-08-01

## 2023-08-01 RX ORDER — CIPROFLOXACIN 500 MG/1
500 TABLET, FILM COATED ORAL 2 TIMES DAILY
Qty: 20 TABLET | Refills: 0 | Status: SHIPPED | OUTPATIENT
Start: 2023-08-01 | End: 2023-08-11

## 2023-08-01 RX ORDER — SODIUM CHLOR/HYPOCHLOROUS ACID 0.033 %
SOLUTION, IRRIGATION IRRIGATION ONCE
OUTPATIENT
Start: 2023-08-01 | End: 2023-08-01

## 2023-08-01 NOTE — DISCHARGE INSTRUCTIONS
eMron Hinkle, RN  Registered Nurse  Wound Care      Signed  Date of Service:  8/1/2023  2:45 PM     [facility] Physician Orders and Discharge Instructions  [address] [place] [address] S. [address] [place] [address]  Telephone: [phone]     NAME:  Jaron Yeung OF BIRTH:  1955  MEDICAL RECORD NUMBER:  [id number]  DATE:  8/1/2023        Return Appointment:  [] Dressing Supply Provider:   [x] Home Healthcare: Sherri Fang  [x] Return Appointment: 1 Week(s)  [] Nurse Visit:      [] Discharge from [facility]: [] Healed        [] Refer to Provider:         [] Consult     Follow-up Information:  [x] Ordered Tests: PVR  TO BE DONE AT [facility]. PLEASE CALL [phone] TO SCHEDULE  [] Rx:   [] Other:         Wound Cleansing:   Do not scrub or use excessive force. Cleanse wound prior to applying a clean dressing with:  [x] Normal Saline OR    [] Keep Wound Dry in Shower     [] Wound Cleanser   [x] Cleanse wound with Mild Soap & Water    [] Other:        Topical Treatments:  Do not apply lotions, creams, or ointments to wound bed unless directed. [] Apply moisturizing lotion to skin surrounding the wound prior to dressing change.  [] Apply antifungal ointment to skin surrounding the wound prior to dressing change.  [] Apply thin film of moisture barrier ointment to skin immediately around wound.   [] Apply Betadine to skin immediately around wound   [] Other:                 Dressings:                  Wound Location       Left Leg  [x] Apply Primary Dressing:                                          [] MediHoney Gel      [] MediHoney Alginate  [x] Calcium Alginate with Silver - SILVERCEL 2ND [] Calcium Alginate without silver              [] Collagen with silver            [] Collagen without Silver    [] Santyl with moist saline gauze                []

## 2023-08-01 NOTE — WOUND CARE
Multilayer Compression Wrap   (Not Unna) Below the Knee    NAME:  Candace Cochran OF BIRTH:  1955  MEDICAL RECORD NUMBER:  [id number]  DATE:  8/1/2023    Multilayer compression wrap: Removed old Multilayer wrap if indicated and wash leg with mild soap/water. Applied primary and secondary dressing as ordered. Applied multilayered dressing below the knee to left lower leg. Instructed patient/caregiver not to remove dressing and to keep it clean and dry. Instructed patient/caregiver on complications to report to provider, such as pain, numbness in toes, heavy drainage, and slippage of dressing. Instructed patient on purpose of compression dressing and on activity and exercise recommendations.       Electronically signed by Marc Polanco RN on 8/1/2023 at 4:07 PM

## 2023-08-01 NOTE — WOUND CARE
Wound Clinic Physician Orders and Discharge Instructions  [address] [place] [address] S. [address]  Telephone: [phone]    NAME:  Alvarado Storey OF BIRTH:  1955  MEDICAL RECORD NUMBER:  [id number]  DATE:  8/1/2023      Return Appointment:  [] Dressing Supply Provider:   [x] Home Healthcare: Carmen Galan  [x] Return Appointment: 1 Week(s)  [] Nurse Visit:     [] Discharge from [facility]: [] Healed        [] Refer to Provider:         [] Consult    Follow-up Information:  [x] Ordered Tests: PVR  TO BE DONE AT [facility]. PLEASE CALL [phone] TO SCHEDULE  [] Rx:   [] Other:       Wound Cleansing:   Do not scrub or use excessive force. Cleanse wound prior to applying a clean dressing with:  [x] Normal Saline OR   [] Keep Wound Dry in Shower     [] Wound Cleanser   [x] Cleanse wound with Mild Soap & Water    [] Other:       Topical Treatments:  Do not apply lotions, creams, or ointments to wound bed unless directed. [] Apply moisturizing lotion to skin surrounding the wound prior to dressing change.  [] Apply antifungal ointment to skin surrounding the wound prior to dressing change.  [] Apply thin film of moisture barrier ointment to skin immediately around wound.   [] Apply Betadine to skin immediately around wound   [] Other:      Dressings:           Wound Location   Left Leg  [x] Apply Primary Dressing:       [] MediHoney Gel [] MediHoney Alginate  [x] Calcium Alginate with Silver - SILVERCEL 2ND [] Calcium Alginate without silver   [] Collagen with silver [] Collagen without Silver    [] Santyl with moist saline gauze     [] Hydrofera Blue (cut to size and moistened with normal saline)  [] Hydrofera Blue Ready (cut to size)      [] Normal Saline wet to dry  [] Betadine wet to dry    [] Hydrogel  [] Mepitel     [] Bactroban/Mupirocin   [] Iodoform Packing Strip [] Plain Packing Strip   [] Skin Sub:   [] Other: ADAPTIC 1ST,   [x]

## 2023-08-01 NOTE — OP NOTE
235Procedure Note  Indications: Based on my examination of this patient's wound(s)/ulcer(s) today, debridement is required to promote healing and evaluate the wound base. Debridement: Excisional Debridement    Using: curette, scissors, and forceps the wound(s)/ulcer(s) was/were debrided down through and including the removal of epidermis, dermis, and subcutaneous tissue. Performed by: Get Mosley MD  Consent obtained: Yes  Time out taken: Yes  Pain Control: Anesthetic  Anesthetic: 4% Lidocaine Liquid Topical       Devitalized Tissue Debrided: fibrin, biofilm, and slough    Pre Debridement Measurements:  Are located in the [facility]  Documentation Flow Sheet    Diabetic/Pressure/Non Pressure Ulcers only:  Ulcer: Non-Pressure ulcer, fat layer exposed     Wound/Ulcer #: 1  Post Debridement Measurements:  Wound/Ulcer Descriptions are Pre Debridement except measurements:  Wound 05/10/23 Leg Left #1 circ (Active)   Wound Image   08/01/23 1516   Wound Etiology Venous 08/01/23 1516   Dressing Status Clean;Dry; Intact 08/01/23 1516   Wound Cleansed Soap and water 08/01/23 1516   Dressing/Treatment Non adherent; Alginate with Ag;Gauze dressing/dressing sponge; Other (comment) 08/01/23 1606   Wound Length (cm) 15 cm 08/01/23 1516   Wound Width (cm) 15.5 cm 08/01/23 1516   Wound Depth (cm) 0.3 cm 08/01/23 1516   Wound Surface Area (cm^2) 232.5 cm^2 08/01/23 1516   Change in Wound Size % (l*w) 68.37 08/01/23 1516   Wound Volume (cm^3) 69.75 cm^3 08/01/23 1516   Wound Healing % 53 08/01/23 1516   Post-Procedure Length (cm) 15.1 cm 08/01/23 1606   Post-Procedure Width (cm) 15.6 cm 08/01/23 1606   Post-Procedure Depth (cm) 0.4 cm 08/01/23 1606   Post-Procedure Surface Area (cm^2) 235.56 cm^2 08/01/23 1606   Post-Procedure Volume (cm^3) 94.224 cm^3 08/01/23 1606   Wound Assessment Slough;Granulation tissue 08/01/23 1516   Drainage Amount Moderate 08/01/23 1516   Drainage Description Serosanguinous 08/01/23 1516   Odor

## 2023-08-09 ENCOUNTER — HOSPITAL ENCOUNTER (OUTPATIENT)
Facility: HOSPITAL | Age: 68
Discharge: HOME | End: 2023-08-09
Payer: MEDICARE

## 2023-08-09 VITALS
RESPIRATION RATE: 20 BRPM | TEMPERATURE: 97.9 F | DIASTOLIC BLOOD PRESSURE: 65 MMHG | SYSTOLIC BLOOD PRESSURE: 109 MMHG | OXYGEN SATURATION: 97 % | HEART RATE: 98 BPM

## 2023-08-09 DIAGNOSIS — L97.822 NON-PRESSURE CHRONIC ULCER OF OTHER PART OF LEFT LOWER LEG WITH FAT LAYER EXPOSED (HCC): Primary | ICD-10-CM

## 2023-08-09 PROCEDURE — 11045 DBRDMT SUBQ TISS EACH ADDL: CPT

## 2023-08-09 PROCEDURE — 87205 SMEAR GRAM STAIN: CPT

## 2023-08-09 PROCEDURE — 87186 SC STD MICRODIL/AGAR DIL: CPT

## 2023-08-09 PROCEDURE — 87070 CULTURE OTHR SPECIMN AEROBIC: CPT

## 2023-08-09 PROCEDURE — 11042 DBRDMT SUBQ TIS 1ST 20SQCM/<: CPT

## 2023-08-09 RX ORDER — SODIUM CHLOR/HYPOCHLOROUS ACID 0.033 %
SOLUTION, IRRIGATION IRRIGATION ONCE
OUTPATIENT
Start: 2023-08-09 | End: 2023-08-09

## 2023-08-09 RX ORDER — LIDOCAINE HYDROCHLORIDE 20 MG/ML
JELLY TOPICAL ONCE
OUTPATIENT
Start: 2023-08-09 | End: 2023-08-09

## 2023-08-09 ASSESSMENT — PAIN DESCRIPTION - ORIENTATION: ORIENTATION: LEFT

## 2023-08-09 ASSESSMENT — PAIN DESCRIPTION - LOCATION: LOCATION: LEG

## 2023-08-09 ASSESSMENT — PAIN SCALES - GENERAL: PAINLEVEL_OUTOF10: 7

## 2023-08-09 NOTE — DISCHARGE INSTRUCTIONS
Wound Clinic Physician Orders and Discharge Instructions  [address] [place] [address] S. [address]  Telephone: [phone]    NAME:  Lexie Asher OF BIRTH:  1955  MEDICAL RECORD NUMBER:  [id number]  DATE:  8/9/2023      Return Appointment:  [] Dressing Supply Provider:   [x] Home Healthcare: Kelli Earl  [x] Return Appointment: 1 Week(s)  [] Nurse Visit:     [] Discharge from [facility]: [] Healed        [] Refer to Provider:         [] Consult    Follow-up Information:  [x] Ordered Tests: PVR  TO BE DONE AT [facility]. PLEASE CALL [phone] TO SCHEDULE  [] Rx:   [x] Other: Dr. Gideon Bell to due vasular procedure      Wound Cleansing:   Do not scrub or use excessive force. Cleanse wound prior to applying a clean dressing with:  [x] Normal Saline OR   [] Keep Wound Dry in Shower     [] Wound Cleanser   [x] Cleanse wound with Mild Soap & Water    [] Other:       Topical Treatments:  Do not apply lotions, creams, or ointments to wound bed unless directed. [] Apply moisturizing lotion to skin surrounding the wound prior to dressing change.  [] Apply antifungal ointment to skin surrounding the wound prior to dressing change.  [] Apply thin film of moisture barrier ointment to skin immediately around wound.   [] Apply Betadine to skin immediately around wound   [] Other:      Dressings:           Wound Location   Left Leg  [x] Apply Primary Dressing:       [] MediHoney Gel [] MediHoney Alginate  [x] Calcium Alginate with Silver - SILVERCEL 2ND [] Calcium Alginate without silver   [] Collagen with silver [] Collagen without Silver    [] Santyl with moist saline gauze     [] Hydrofera Blue (cut to size and moistened with normal saline)  [] Hydrofera Blue Ready (cut to size)      [] Normal Saline wet to dry  [] Betadine wet to dry    [] Hydrogel  [] Mepitel     [] Bactroban/Mupirocin   [] Iodoform Packing Strip [] Plain Packing Strip   [] Skin

## 2023-08-09 NOTE — WOUND CARE
Multilayer Compression Wrap   (Not Unna) Below the Knee    NAME:  Eric Packer OF BIRTH:  1955  MEDICAL RECORD NUMBER:  [id number]  DATE:  8/9/2023    Multilayer compression wrap: Removed old Multilayer wrap if indicated and wash leg with mild soap/water. Applied moisturizing agent to dry skin as needed. Applied primary and secondary dressing as ordered. Applied multilayered dressing below the knee to left lower leg. Instructed patient/caregiver not to remove dressing and to keep it clean and dry. Instructed patient/caregiver on complications to report to provider, such as pain, numbness in toes, heavy drainage, and slippage of dressing. Instructed patient on purpose of compression dressing and on activity and exercise recommendations.       Electronically signed by Jonatan Horan LPN on 3/9/7144 at 1:66 PM
[] Strict Bedrest   [] Remain off Work      [] May return to full duty work                                     [] Return to work with restrictions     Physician:  [x] Dr. Allen Rodriguez  [] Dr. Carmen Sebastian  [] Dr. Karma Kan      Nurse Case Manger:  Dereje Larry Information: Should you experience any significant changes in your wound(s) or have questions about your wound care, please contact the [facility] at [phone]. Our hours are Monday - Friday 8am - 4:30pm, closed all major holidays. If you need help with your wound outside these hours and cannot wait until we are again available, contact your PCP or go to the hospital emergency room. PLEASE NOTE: IF YOU ARE UNABLE TO OBTAIN WOUND SUPPLIES, CONTINUE TO USE THE SUPPLIES YOU HAVE AVAILABLE UNTIL YOU ARE ABLE TO REACH US. IT IS MOST IMPORTANT TO KEEP THE WOUND COVERED AT ALL TIMES.
Dressing/Treatment Non adherent; Alginate with Ag;Gauze dressing/dressing sponge; Other (comment) 08/01/23 1606   Wound Length (cm) 14 cm 08/09/23 1426   Wound Width (cm) 24 cm 08/09/23 1426   Wound Depth (cm) 0.3 cm 08/09/23 1426   Wound Surface Area (cm^2) 336 cm^2 08/09/23 1426   Change in Wound Size % (l*w) 54.29 08/09/23 1426   Wound Volume (cm^3) 100.8 cm^3 08/09/23 1426   Wound Healing % 31 08/09/23 1426   Post-Procedure Length (cm) 14 cm 08/09/23 1448   Post-Procedure Width (cm) 24 cm 08/09/23 1448   Post-Procedure Depth (cm) 0.3 cm 08/09/23 1448   Post-Procedure Surface Area (cm^2) 336 cm^2 08/09/23 1448   Post-Procedure Volume (cm^3) 100.8 cm^3 08/09/23 1448   Wound Assessment Slough;Granulation tissue 08/09/23 1426   Drainage Amount Moderate (25-50%) 08/09/23 1426   Drainage Description Serosanguinous 08/09/23 1426   Odor None 08/09/23 1426   Emy-wound Assessment Fragile; Intact;Dry/flaky 08/09/23 1426   Margins Attached edges 08/09/23 1426   Wound Thickness Description not for Pressure Injury Full thickness 08/09/23 1426   Number of days: 91        Total Surface Area Debrided:  336 sq cm   Estimated Blood Loss: Minimal amount blood loss . Hemostasis Achieved:  by pressure  Procedural Pain: 3  / 10   Post Procedural Pain: 1 / 10   Response to treatment: Patient tolerated procedure well with minimal complaints of pain.

## 2023-08-13 LAB
BACTERIA SPEC CULT: ABNORMAL
BACTERIA SPEC CULT: ABNORMAL
GRAM STN SPEC: ABNORMAL
GRAM STN SPEC: ABNORMAL
Lab: ABNORMAL

## 2023-08-15 ENCOUNTER — HOSPITAL ENCOUNTER (OUTPATIENT)
Facility: HOSPITAL | Age: 68
Discharge: HOME | End: 2023-08-15
Payer: MEDICARE

## 2023-08-15 VITALS
SYSTOLIC BLOOD PRESSURE: 134 MMHG | DIASTOLIC BLOOD PRESSURE: 64 MMHG | RESPIRATION RATE: 20 BRPM | TEMPERATURE: 97.8 F | HEART RATE: 96 BPM

## 2023-08-15 DIAGNOSIS — L97.822 NON-PRESSURE CHRONIC ULCER OF OTHER PART OF LEFT LOWER LEG WITH FAT LAYER EXPOSED (HCC): Primary | ICD-10-CM

## 2023-08-15 DIAGNOSIS — L97.302 VENOUS STASIS ULCER OF ANKLE WITH FAT LAYER EXPOSED WITH VARICOSE VEINS, UNSPECIFIED LATERALITY (HCC): ICD-10-CM

## 2023-08-15 DIAGNOSIS — T14.8XXA WOUND INFECTION: ICD-10-CM

## 2023-08-15 DIAGNOSIS — L08.9 WOUND INFECTION: ICD-10-CM

## 2023-08-15 DIAGNOSIS — I83.003 VENOUS STASIS ULCER OF ANKLE WITH FAT LAYER EXPOSED WITH VARICOSE VEINS, UNSPECIFIED LATERALITY (HCC): ICD-10-CM

## 2023-08-15 PROCEDURE — 29581 APPL MULTLAYER CMPRN SYS LEG: CPT

## 2023-08-15 RX ORDER — SODIUM CHLOR/HYPOCHLOROUS ACID 0.033 %
SOLUTION, IRRIGATION IRRIGATION ONCE
OUTPATIENT
Start: 2023-08-15 | End: 2023-08-15

## 2023-08-15 RX ORDER — LIDOCAINE HYDROCHLORIDE 20 MG/ML
JELLY TOPICAL ONCE
OUTPATIENT
Start: 2023-08-15 | End: 2023-08-15

## 2023-08-15 ASSESSMENT — PAIN DESCRIPTION - ORIENTATION: ORIENTATION: LEFT

## 2023-08-15 ASSESSMENT — PAIN SCALES - GENERAL: PAINLEVEL_OUTOF10: 8

## 2023-08-15 ASSESSMENT — PAIN DESCRIPTION - DESCRIPTORS: DESCRIPTORS: ACHING

## 2023-08-15 ASSESSMENT — PAIN DESCRIPTION - LOCATION: LOCATION: LEG

## 2023-08-15 NOTE — DISCHARGE INSTRUCTIONS
Sabas Conley, RN  Registered Nurse  Wound Care      Signed  Date of Service:  8/15/2023 12:00 PM     Signed                                                                                               Wound Clinic Physician Orders and Discharge Instructions  [address] [place] [address] S. [address] [place] [address]  Telephone: [phone]     NAME:  Majo Denton OF BIRTH:  1955  MEDICAL RECORD NUMBER:  [id number]  DATE:  8/15/2023        Return Appointment:  [] Dressing Supply Provider:   [x] Home Healthcare: Vivek Ramsey  [x] Return Appointment: 1 Week(s)  [] Nurse Visit:      [] Discharge from [facility]: [] Healed        [] Refer to Provider:         [] Consult     Follow-up Information:  [x] Ordered Tests: PVR  TO BE DONE AT [facility]. PLEASE CALL [phone] TO SCHEDULE  [] Rx:   [x] Other: Dr. Alysha Johnson to due vasular procedure  OTHER: REFERRAL TO DR Chantel Leon - INFECTIOUS DISEASE- PATIENT TO SCHEDULE AND APPOINTMENT. Wound Cleansing:   Do not scrub or use excessive force. Cleanse wound prior to applying a clean dressing with:  [x] Normal Saline OR    [] Keep Wound Dry in Shower     [] Wound Cleanser   [x] Cleanse wound with Mild Soap & Water    [] Other:        Topical Treatments:  Do not apply lotions, creams, or ointments to wound bed unless directed. [] Apply moisturizing lotion to skin surrounding the wound prior to dressing change.  [] Apply antifungal ointment to skin surrounding the wound prior to dressing change.  [] Apply thin film of moisture barrier ointment to skin immediately around wound.   [] Apply Betadine to skin immediately around wound   [] Other:                 Dressings:                  Wound Location       Left Leg  [x] Apply Primary Dressing:                                          [] MediHoney Gel      [] MediHoney Alginate  [x] Calcium Alginate with Silver - SILVERCEL 2ND [] Calcium Alginate without silver

## 2023-08-15 NOTE — WOUND CARE
Multilayer Compression Wrap   (Not Unna) Below the Knee    NAME:  Blaine Bautista OF BIRTH:  1955  MEDICAL RECORD NUMBER:  [id number]  DATE:  8/15/2023    Multilayer compression wrap: Removed old Multilayer wrap if indicated and wash leg with mild soap/water. Applied primary and secondary dressing as ordered. Applied multilayered dressing below the knee to left lower leg. Instructed patient/caregiver not to remove dressing and to keep it clean and dry. Instructed patient/caregiver on complications to report to provider, such as pain, numbness in toes, heavy drainage, and slippage of dressing. Instructed patient on purpose of compression dressing and on activity and exercise recommendations.       Electronically signed by Juliet Quintero RN on 8/15/2023 at 12:27 PM

## 2023-08-15 NOTE — WOUND CARE
Wound Clinic Physician Orders and Discharge Instructions  [address] [place] [address] S. [address], [address] [place] [address] Way  Telephone: [phone]    NAME:  Ponce Ngo OF BIRTH:  1955  MEDICAL RECORD NUMBER:  [id number]  DATE:  8/15/2023      Return Appointment:  [] Dressing Supply Provider:   [x] Home Healthcare: Efrain John  [x] Return Appointment: 1 Week(s)  [] Nurse Visit:     [] Discharge from [facility]: [] Healed        [] Refer to Provider:         [] Consult    Follow-up Information:  [x] Ordered Tests: PVR  TO BE DONE AT [facility]. PLEASE CALL [phone] TO SCHEDULE  [] Rx:   [x] Other: Dr. Adelaide Escamilla to due vasular procedure  OTHER: REFERRAL TO DR Robbie Gibson - INFECTIOUS DISEASE- PATIENT TO SCHEDULE AND APPOINTMENT. Wound Cleansing:   Do not scrub or use excessive force. Cleanse wound prior to applying a clean dressing with:  [x] Normal Saline OR   [] Keep Wound Dry in Shower     [] Wound Cleanser   [x] Cleanse wound with Mild Soap & Water    [] Other:       Topical Treatments:  Do not apply lotions, creams, or ointments to wound bed unless directed. [] Apply moisturizing lotion to skin surrounding the wound prior to dressing change.  [] Apply antifungal ointment to skin surrounding the wound prior to dressing change.  [] Apply thin film of moisture barrier ointment to skin immediately around wound.   [] Apply Betadine to skin immediately around wound   [] Other:      Dressings:           Wound Location   Left Leg  [x] Apply Primary Dressing:       [] MediHoney Gel [] MediHoney Alginate  [x] Calcium Alginate with Silver - SILVERCEL 2ND [] Calcium Alginate without silver   [] Collagen with silver [] Collagen without Silver    [] Santyl with moist saline gauze     [] Hydrofera Blue (cut to size and moistened with normal saline)  [] Hydrofera Blue Ready (cut to size)      [] Normal Saline wet to dry  [] Betadine wet to dry    [] Hydrogel  [] Mepitel

## 2023-08-21 ENCOUNTER — TELEPHONE (OUTPATIENT)
Age: 68
End: 2023-08-21

## 2023-08-21 NOTE — TELEPHONE ENCOUNTER
Wound Care did a culture the week before last.  It came back as resistant. They did away with the oral antibiotics. They said it will not do any good. They said he needs IV antibiotics as soon as possible. I scheduled an appointment with you for tomorrow.

## 2023-08-22 ENCOUNTER — OFFICE VISIT (OUTPATIENT)
Age: 68
End: 2023-08-22
Payer: MEDICARE

## 2023-08-22 VITALS
TEMPERATURE: 98.6 F | HEIGHT: 71 IN | RESPIRATION RATE: 20 BRPM | HEART RATE: 95 BPM | SYSTOLIC BLOOD PRESSURE: 107 MMHG | BODY MASS INDEX: 44.1 KG/M2 | WEIGHT: 315 LBS | OXYGEN SATURATION: 97 % | DIASTOLIC BLOOD PRESSURE: 64 MMHG

## 2023-08-22 DIAGNOSIS — I87.2 VENOUS STASIS DERMATITIS OF BOTH LOWER EXTREMITIES: ICD-10-CM

## 2023-08-22 DIAGNOSIS — J44.9 CHRONIC OBSTRUCTIVE PULMONARY DISEASE, UNSPECIFIED COPD TYPE (HCC): ICD-10-CM

## 2023-08-22 DIAGNOSIS — L97.921 ULCER OF LEFT LOWER EXTREMITY, LIMITED TO BREAKDOWN OF SKIN (HCC): Primary | ICD-10-CM

## 2023-08-22 PROCEDURE — G8427 DOCREV CUR MEDS BY ELIG CLIN: HCPCS | Performed by: INTERNAL MEDICINE

## 2023-08-22 PROCEDURE — G8419 CALC BMI OUT NRM PARAM NOF/U: HCPCS | Performed by: INTERNAL MEDICINE

## 2023-08-22 PROCEDURE — 99214 OFFICE O/P EST MOD 30 MIN: CPT | Performed by: INTERNAL MEDICINE

## 2023-08-22 PROCEDURE — 3023F SPIROM DOC REV: CPT | Performed by: INTERNAL MEDICINE

## 2023-08-22 PROCEDURE — 1123F ACP DISCUSS/DSCN MKR DOCD: CPT | Performed by: INTERNAL MEDICINE

## 2023-08-22 PROCEDURE — 1036F TOBACCO NON-USER: CPT | Performed by: INTERNAL MEDICINE

## 2023-08-22 PROCEDURE — 3017F COLORECTAL CA SCREEN DOC REV: CPT | Performed by: INTERNAL MEDICINE

## 2023-08-22 RX ORDER — INSULIN HUMAN 100 [IU]/ML
INJECTION, SUSPENSION SUBCUTANEOUS
Status: ON HOLD | COMMUNITY
Start: 2023-07-10 | End: 2023-08-23

## 2023-08-22 RX ORDER — METRONIDAZOLE 500 MG/1
TABLET ORAL
COMMUNITY
Start: 2023-06-29 | End: 2023-08-23

## 2023-08-22 RX ORDER — BUMETANIDE 2 MG/1
2 TABLET ORAL 2 TIMES DAILY
Status: ON HOLD | COMMUNITY
Start: 2023-08-11

## 2023-08-22 RX ORDER — INSULIN GLARGINE 100 [IU]/ML
INJECTION, SOLUTION SUBCUTANEOUS
Status: ON HOLD | COMMUNITY
Start: 2023-08-04

## 2023-08-22 RX ORDER — METHYLPREDNISOLONE 4 MG/1
TABLET ORAL
Status: ON HOLD | COMMUNITY
Start: 2023-07-07 | End: 2023-08-23

## 2023-08-22 RX ORDER — INSULIN LISPRO 100 [IU]/ML
INJECTION, SOLUTION INTRAVENOUS; SUBCUTANEOUS
Status: ON HOLD | COMMUNITY
Start: 2023-07-13

## 2023-08-22 RX ORDER — SACUBITRIL AND VALSARTAN 24; 26 MG/1; MG/1
1 TABLET, FILM COATED ORAL 2 TIMES DAILY
Status: ON HOLD | COMMUNITY
Start: 2023-08-09

## 2023-08-22 ASSESSMENT — PATIENT HEALTH QUESTIONNAIRE - PHQ9
SUM OF ALL RESPONSES TO PHQ9 QUESTIONS 1 & 2: 0
SUM OF ALL RESPONSES TO PHQ QUESTIONS 1-9: 0
1. LITTLE INTEREST OR PLEASURE IN DOING THINGS: 0
2. FEELING DOWN, DEPRESSED OR HOPELESS: 0

## 2023-08-22 ASSESSMENT — ENCOUNTER SYMPTOMS
RESPIRATORY NEGATIVE: 1
GASTROINTESTINAL NEGATIVE: 1
ROS SKIN COMMENTS: LEFT LEG ULCER

## 2023-08-22 NOTE — PROGRESS NOTES
1. \"Have you been to the ER, urgent care clinic since your last visit? Hospitalized since your last visit? \" No    2. \"Have you seen or consulted any other health care providers outside of the [address] [place] [address] since your last visit? \" No     3. For patients aged 43-73: Has the patient had a colonoscopy / FIT/ Cologuard? No      If the patient is female:    4. For patients aged 43-66: Has the patient had a mammogram within the past 2 years? 5. For patients aged 21-65: Has the patient had a pap smear?      Chief Complaint   Patient presents with    Follow-up     IV antibiotics
Left leg pressure dressing in place   Skin:     General: Skin is warm and dry. Comments: Chronic left leg ulcer, images reviewed    Neurological:      General: No focal deficit present. Mental Status: He is alert and oriented to person, place, and time. Assessment & Plan  Dave Reed was seen today for follow-up. Diagnoses and all orders for this visit:    Ulcer of left lower extremity, limited to breakdown of skin (HCC)  -     cefepime (MAXIPIME) infusion; Infuse 1,000 mg intravenously in the morning and 1,000 mg in the evening. Do all this for 21 days. BUN/Cr, CRP and ESR wkly while on IV antibiotics.  - P.aeruginosa from wound Cxs R to quinolone (08/09/23)   - Verify coverage of IV antibiotics by his insurance   - Arrange for PICC line placement if meds covered or pt willing to pay out of pocket   - He is considering being admitted for IV antibiotics     Chronic obstructive pulmonary disease, unspecified COPD type ([address])  - On chronic O2.  Clear lungs on exam     Venous stasis dermatitis of both lower extremities: Chronic

## 2023-08-23 ENCOUNTER — APPOINTMENT (OUTPATIENT)
Facility: HOSPITAL | Age: 68
DRG: 603 | End: 2023-08-23
Payer: MEDICARE

## 2023-08-23 ENCOUNTER — HOSPITAL ENCOUNTER (OUTPATIENT)
Facility: HOSPITAL | Age: 68
Discharge: HOME | End: 2023-08-23
Payer: MEDICARE

## 2023-08-23 ENCOUNTER — HOSPITAL ENCOUNTER (INPATIENT)
Facility: HOSPITAL | Age: 68
LOS: 5 days | Discharge: NURSING, REHAB OR LONG-TERM CARE | DRG: 603 | End: 2023-08-28
Attending: STUDENT IN AN ORGANIZED HEALTH CARE EDUCATION/TRAINING PROGRAM | Admitting: FAMILY MEDICINE
Payer: MEDICARE

## 2023-08-23 VITALS
RESPIRATION RATE: 20 BRPM | SYSTOLIC BLOOD PRESSURE: 123 MMHG | TEMPERATURE: 98.5 F | DIASTOLIC BLOOD PRESSURE: 66 MMHG | HEART RATE: 96 BPM

## 2023-08-23 DIAGNOSIS — I82.4Y3 DEEP VEIN THROMBOSIS (DVT) OF PROXIMAL VEIN OF BOTH LOWER EXTREMITIES, UNSPECIFIED CHRONICITY (HCC): ICD-10-CM

## 2023-08-23 DIAGNOSIS — L97.822 NON-PRESSURE CHRONIC ULCER OF OTHER PART OF LEFT LOWER LEG WITH FAT LAYER EXPOSED (HCC): Primary | ICD-10-CM

## 2023-08-23 DIAGNOSIS — E86.0 DEHYDRATION: ICD-10-CM

## 2023-08-23 DIAGNOSIS — L08.9 SOFT TISSUE INFECTION: Primary | ICD-10-CM

## 2023-08-23 DIAGNOSIS — R07.9 ACUTE CHEST PAIN: ICD-10-CM

## 2023-08-23 PROBLEM — A41.9 SEPSIS (HCC): Status: ACTIVE | Noted: 2023-08-23

## 2023-08-23 LAB
ANION GAP SERPL CALC-SCNC: 8 MMOL/L (ref 5–15)
APPEARANCE UR: ABNORMAL
BACTERIA URNS QL MICRO: NEGATIVE /HPF
BASE EXCESS BLD CALC-SCNC: 3.5 MMOL/L
BASOPHILS # BLD: 0 K/UL (ref 0–0.1)
BASOPHILS NFR BLD: 0 % (ref 0–1)
BILIRUB UR QL: NEGATIVE
BUN SERPL-MCNC: 23 MG/DL (ref 6–20)
BUN/CREAT SERPL: 17 (ref 12–20)
CA-I BLD-MCNC: 1.23 MMOL/L (ref 1.12–1.32)
CA-I BLD-MCNC: 9.1 MG/DL (ref 8.5–10.1)
CAOX CRY URNS QL MICRO: ABNORMAL
CHLORIDE BLD-SCNC: 109 MMOL/L (ref 98–107)
CHLORIDE SERPL-SCNC: 107 MMOL/L (ref 97–108)
CO2 BLD-SCNC: 29 MMOL/L
CO2 SERPL-SCNC: 28 MMOL/L (ref 21–32)
COLOR UR: ABNORMAL
CREAT SERPL-MCNC: 1.32 MG/DL (ref 0.7–1.3)
CREAT UR-MCNC: 1.16 MG/DL (ref 0.6–1.3)
DIFFERENTIAL METHOD BLD: NORMAL
EOSINOPHIL # BLD: 0.4 K/UL (ref 0–0.4)
EOSINOPHIL NFR BLD: 4 % (ref 0–7)
EPITH CASTS URNS QL MICRO: ABNORMAL /LPF
ERYTHROCYTE [DISTWIDTH] IN BLOOD BY AUTOMATED COUNT: 13 % (ref 11.5–14.5)
GLUCOSE BLD STRIP.AUTO-MCNC: 104 MG/DL (ref 65–100)
GLUCOSE BLD STRIP.AUTO-MCNC: 106 MG/DL (ref 65–100)
GLUCOSE SERPL-MCNC: 127 MG/DL (ref 65–100)
GLUCOSE UR STRIP.AUTO-MCNC: 50 MG/DL
HCO3 BLD-SCNC: 28.7 MMOL/L (ref 19–28)
HCT VFR BLD AUTO: 38.5 % (ref 36.6–50.3)
HGB BLD-MCNC: 12.3 G/DL (ref 12.1–17)
HGB UR QL STRIP: NEGATIVE
HYALINE CASTS URNS QL MICRO: ABNORMAL /LPF (ref 0–5)
IMM GRANULOCYTES # BLD AUTO: 0 K/UL (ref 0–0.04)
IMM GRANULOCYTES NFR BLD AUTO: 0 % (ref 0–0.5)
KETONES UR QL STRIP.AUTO: 5 MG/DL
LACTATE BLD-SCNC: 0.94 MMOL/L (ref 0.4–2)
LACTATE BLD-SCNC: 3.07 MMOL/L (ref 0.4–2)
LEUKOCYTE ESTERASE UR QL STRIP.AUTO: NEGATIVE
LYMPHOCYTES # BLD: 1.7 K/UL (ref 0.8–3.5)
LYMPHOCYTES NFR BLD: 17 % (ref 12–49)
MCH RBC QN AUTO: 29.5 PG (ref 26–34)
MCHC RBC AUTO-ENTMCNC: 31.9 G/DL (ref 30–36.5)
MCV RBC AUTO: 92.3 FL (ref 80–99)
MONOCYTES # BLD: 0.7 K/UL (ref 0–1)
MONOCYTES NFR BLD: 7 % (ref 5–13)
MUCOUS THREADS URNS QL MICRO: ABNORMAL /LPF
NEUTS SEG # BLD: 6.8 K/UL (ref 1.8–8)
NEUTS SEG NFR BLD: 72 % (ref 32–75)
NITRITE UR QL STRIP.AUTO: NEGATIVE
NRBC # BLD: 0 K/UL (ref 0–0.01)
NRBC BLD-RTO: 0 PER 100 WBC
PCO2 BLD: 45 MMHG (ref 35–45)
PERFORMED BY:: ABNORMAL
PERFORMED BY:: ABNORMAL
PERFORMED BY:: NORMAL
PH BLD: 7.41 (ref 7.35–7.45)
PH UR STRIP: 5 (ref 5–8)
PLATELET # BLD AUTO: 357 K/UL (ref 150–400)
PMV BLD AUTO: 9.9 FL (ref 8.9–12.9)
PO2 BLD: 43 MMHG (ref 75–100)
POTASSIUM BLD-SCNC: 4.5 MMOL/L (ref 3.5–5.5)
POTASSIUM SERPL-SCNC: 4.4 MMOL/L (ref 3.5–5.1)
PROCALCITONIN SERPL-MCNC: 0.06 NG/ML
PROT UR STRIP-MCNC: 100 MG/DL
RBC # BLD AUTO: 4.17 M/UL (ref 4.1–5.7)
RBC #/AREA URNS HPF: ABNORMAL /HPF (ref 0–5)
SAO2 % BLD: 79 %
SERVICE CMNT-IMP: ABNORMAL
SODIUM BLD-SCNC: 144 MMOL/L (ref 136–145)
SODIUM SERPL-SCNC: 143 MMOL/L (ref 136–145)
SP GR UR REFRACTOMETRY: 1.03 (ref 1–1.03)
SPECIMEN SITE: ABNORMAL
TROPONIN I SERPL HS-MCNC: 16 NG/L (ref 0–76)
TROPONIN I SERPL HS-MCNC: 17 NG/L (ref 0–76)
URINE CULTURE IF INDICATED: ABNORMAL
UROBILINOGEN UR QL STRIP.AUTO: 0.1 EU/DL (ref 0.1–1)
WBC # BLD AUTO: 9.5 K/UL (ref 4.1–11.1)
WBC URNS QL MICRO: ABNORMAL /HPF (ref 0–4)

## 2023-08-23 PROCEDURE — 6370000000 HC RX 637 (ALT 250 FOR IP): Performed by: FAMILY MEDICINE

## 2023-08-23 PROCEDURE — 85025 COMPLETE CBC W/AUTO DIFF WBC: CPT

## 2023-08-23 PROCEDURE — 84145 PROCALCITONIN (PCT): CPT

## 2023-08-23 PROCEDURE — 93005 ELECTROCARDIOGRAM TRACING: CPT | Performed by: STUDENT IN AN ORGANIZED HEALTH CARE EDUCATION/TRAINING PROGRAM

## 2023-08-23 PROCEDURE — 84132 ASSAY OF SERUM POTASSIUM: CPT

## 2023-08-23 PROCEDURE — 73600 X-RAY EXAM OF ANKLE: CPT

## 2023-08-23 PROCEDURE — 82330 ASSAY OF CALCIUM: CPT

## 2023-08-23 PROCEDURE — 36415 COLL VENOUS BLD VENIPUNCTURE: CPT

## 2023-08-23 PROCEDURE — 96360 HYDRATION IV INFUSION INIT: CPT

## 2023-08-23 PROCEDURE — 84484 ASSAY OF TROPONIN QUANT: CPT

## 2023-08-23 PROCEDURE — 6360000002 HC RX W HCPCS: Performed by: STUDENT IN AN ORGANIZED HEALTH CARE EDUCATION/TRAINING PROGRAM

## 2023-08-23 PROCEDURE — 81001 URINALYSIS AUTO W/SCOPE: CPT

## 2023-08-23 PROCEDURE — 2580000003 HC RX 258: Performed by: STUDENT IN AN ORGANIZED HEALTH CARE EDUCATION/TRAINING PROGRAM

## 2023-08-23 PROCEDURE — 87040 BLOOD CULTURE FOR BACTERIA: CPT

## 2023-08-23 PROCEDURE — 83036 HEMOGLOBIN GLYCOSYLATED A1C: CPT

## 2023-08-23 PROCEDURE — 96361 HYDRATE IV INFUSION ADD-ON: CPT

## 2023-08-23 PROCEDURE — 71045 X-RAY EXAM CHEST 1 VIEW: CPT

## 2023-08-23 PROCEDURE — 94640 AIRWAY INHALATION TREATMENT: CPT

## 2023-08-23 PROCEDURE — 99285 EMERGENCY DEPT VISIT HI MDM: CPT

## 2023-08-23 PROCEDURE — 83605 ASSAY OF LACTIC ACID: CPT

## 2023-08-23 PROCEDURE — 2580000003 HC RX 258: Performed by: FAMILY MEDICINE

## 2023-08-23 PROCEDURE — 73590 X-RAY EXAM OF LOWER LEG: CPT

## 2023-08-23 PROCEDURE — 82803 BLOOD GASES ANY COMBINATION: CPT

## 2023-08-23 PROCEDURE — 99213 OFFICE O/P EST LOW 20 MIN: CPT

## 2023-08-23 PROCEDURE — 6360000002 HC RX W HCPCS: Performed by: FAMILY MEDICINE

## 2023-08-23 PROCEDURE — 82947 ASSAY GLUCOSE BLOOD QUANT: CPT

## 2023-08-23 PROCEDURE — 84295 ASSAY OF SERUM SODIUM: CPT

## 2023-08-23 PROCEDURE — 80048 BASIC METABOLIC PNL TOTAL CA: CPT

## 2023-08-23 PROCEDURE — 1100000000 HC RM PRIVATE

## 2023-08-23 PROCEDURE — 82962 GLUCOSE BLOOD TEST: CPT

## 2023-08-23 RX ORDER — SODIUM CHLOR/HYPOCHLOROUS ACID 0.033 %
SOLUTION, IRRIGATION IRRIGATION ONCE
OUTPATIENT
Start: 2023-08-23 | End: 2023-08-23

## 2023-08-23 RX ORDER — GABAPENTIN 300 MG/1
300 CAPSULE ORAL 4 TIMES DAILY
Status: DISCONTINUED | OUTPATIENT
Start: 2023-08-23 | End: 2023-08-24

## 2023-08-23 RX ORDER — ONDANSETRON 4 MG/1
4 TABLET, ORALLY DISINTEGRATING ORAL EVERY 8 HOURS PRN
Status: DISCONTINUED | OUTPATIENT
Start: 2023-08-23 | End: 2023-08-28 | Stop reason: HOSPADM

## 2023-08-23 RX ORDER — FLUTICASONE PROPIONATE 50 MCG
2 SPRAY, SUSPENSION (ML) NASAL 2 TIMES DAILY
Status: DISCONTINUED | OUTPATIENT
Start: 2023-08-23 | End: 2023-08-28 | Stop reason: HOSPADM

## 2023-08-23 RX ORDER — ONDANSETRON 2 MG/ML
4 INJECTION INTRAMUSCULAR; INTRAVENOUS EVERY 6 HOURS PRN
Status: DISCONTINUED | OUTPATIENT
Start: 2023-08-23 | End: 2023-08-28 | Stop reason: HOSPADM

## 2023-08-23 RX ORDER — B-COMPLEX WITH VITAMIN C
1 TABLET ORAL DAILY
Status: DISCONTINUED | OUTPATIENT
Start: 2023-08-24 | End: 2023-08-26

## 2023-08-23 RX ORDER — INSULIN LISPRO 100 [IU]/ML
0-16 INJECTION, SOLUTION INTRAVENOUS; SUBCUTANEOUS
Status: DISCONTINUED | OUTPATIENT
Start: 2023-08-24 | End: 2023-08-28 | Stop reason: HOSPADM

## 2023-08-23 RX ORDER — POTASSIUM CHLORIDE 750 MG/1
10 TABLET, FILM COATED, EXTENDED RELEASE ORAL 4 TIMES DAILY
Status: DISCONTINUED | OUTPATIENT
Start: 2023-08-24 | End: 2023-08-28 | Stop reason: HOSPADM

## 2023-08-23 RX ORDER — ACETAMINOPHEN 650 MG/1
650 SUPPOSITORY RECTAL EVERY 6 HOURS PRN
Status: DISCONTINUED | OUTPATIENT
Start: 2023-08-23 | End: 2023-08-28 | Stop reason: HOSPADM

## 2023-08-23 RX ORDER — INSULIN LISPRO 100 [IU]/ML
0-4 INJECTION, SOLUTION INTRAVENOUS; SUBCUTANEOUS NIGHTLY
Status: DISCONTINUED | OUTPATIENT
Start: 2023-08-23 | End: 2023-08-28 | Stop reason: HOSPADM

## 2023-08-23 RX ORDER — SODIUM CHLORIDE 0.9 % (FLUSH) 0.9 %
5-40 SYRINGE (ML) INJECTION PRN
Status: DISCONTINUED | OUTPATIENT
Start: 2023-08-23 | End: 2023-08-28 | Stop reason: HOSPADM

## 2023-08-23 RX ORDER — ENOXAPARIN SODIUM 100 MG/ML
40 INJECTION SUBCUTANEOUS 2 TIMES DAILY
Status: DISCONTINUED | OUTPATIENT
Start: 2023-08-23 | End: 2023-08-28 | Stop reason: HOSPADM

## 2023-08-23 RX ORDER — ALBUTEROL SULFATE 90 UG/1
1 AEROSOL, METERED RESPIRATORY (INHALATION) EVERY 4 HOURS PRN
Status: DISCONTINUED | OUTPATIENT
Start: 2023-08-23 | End: 2023-08-28 | Stop reason: HOSPADM

## 2023-08-23 RX ORDER — ASPIRIN 81 MG/1
81 TABLET, CHEWABLE ORAL DAILY
Status: DISCONTINUED | OUTPATIENT
Start: 2023-08-24 | End: 2023-08-28 | Stop reason: HOSPADM

## 2023-08-23 RX ORDER — LIDOCAINE HYDROCHLORIDE 20 MG/ML
JELLY TOPICAL ONCE
OUTPATIENT
Start: 2023-08-23 | End: 2023-08-23

## 2023-08-23 RX ORDER — OXYCODONE HYDROCHLORIDE 5 MG/1
5 TABLET ORAL EVERY 6 HOURS PRN
Status: DISCONTINUED | OUTPATIENT
Start: 2023-08-23 | End: 2023-08-24

## 2023-08-23 RX ORDER — POLYETHYLENE GLYCOL 3350 17 G/17G
17 POWDER, FOR SOLUTION ORAL DAILY PRN
Status: DISCONTINUED | OUTPATIENT
Start: 2023-08-23 | End: 2023-08-28 | Stop reason: HOSPADM

## 2023-08-23 RX ORDER — CETIRIZINE HYDROCHLORIDE 10 MG/1
10 TABLET ORAL DAILY
Status: DISCONTINUED | OUTPATIENT
Start: 2023-08-24 | End: 2023-08-28 | Stop reason: HOSPADM

## 2023-08-23 RX ORDER — SODIUM CHLORIDE 9 MG/ML
INJECTION, SOLUTION INTRAVENOUS PRN
Status: DISCONTINUED | OUTPATIENT
Start: 2023-08-23 | End: 2023-08-28 | Stop reason: HOSPADM

## 2023-08-23 RX ORDER — IPRATROPIUM BROMIDE AND ALBUTEROL SULFATE 2.5; .5 MG/3ML; MG/3ML
1 SOLUTION RESPIRATORY (INHALATION)
Status: DISCONTINUED | OUTPATIENT
Start: 2023-08-23 | End: 2023-08-28 | Stop reason: HOSPADM

## 2023-08-23 RX ORDER — INSULIN GLARGINE 100 [IU]/ML
32 INJECTION, SOLUTION SUBCUTANEOUS EVERY EVENING
Status: DISCONTINUED | OUTPATIENT
Start: 2023-08-23 | End: 2023-08-28 | Stop reason: HOSPADM

## 2023-08-23 RX ORDER — COLCHICINE 0.6 MG/1
0.6 TABLET ORAL DAILY
Status: DISCONTINUED | OUTPATIENT
Start: 2023-08-24 | End: 2023-08-28 | Stop reason: HOSPADM

## 2023-08-23 RX ORDER — BUMETANIDE 1 MG/1
2 TABLET ORAL 2 TIMES DAILY
Status: DISCONTINUED | OUTPATIENT
Start: 2023-08-23 | End: 2023-08-28 | Stop reason: HOSPADM

## 2023-08-23 RX ORDER — ASCORBIC ACID 500 MG
1000 TABLET ORAL 2 TIMES DAILY
Status: DISCONTINUED | OUTPATIENT
Start: 2023-08-23 | End: 2023-08-28 | Stop reason: HOSPADM

## 2023-08-23 RX ORDER — ATORVASTATIN CALCIUM 40 MG/1
40 TABLET, FILM COATED ORAL NIGHTLY
Status: DISCONTINUED | OUTPATIENT
Start: 2023-08-23 | End: 2023-08-28 | Stop reason: HOSPADM

## 2023-08-23 RX ORDER — SODIUM CHLORIDE 0.9 % (FLUSH) 0.9 %
5-40 SYRINGE (ML) INJECTION EVERY 12 HOURS SCHEDULED
Status: DISCONTINUED | OUTPATIENT
Start: 2023-08-23 | End: 2023-08-28 | Stop reason: HOSPADM

## 2023-08-23 RX ORDER — ACETAMINOPHEN 325 MG/1
650 TABLET ORAL EVERY 6 HOURS PRN
Status: DISCONTINUED | OUTPATIENT
Start: 2023-08-23 | End: 2023-08-28 | Stop reason: HOSPADM

## 2023-08-23 RX ORDER — DEXTROSE MONOHYDRATE 100 MG/ML
INJECTION, SOLUTION INTRAVENOUS CONTINUOUS PRN
Status: DISCONTINUED | OUTPATIENT
Start: 2023-08-23 | End: 2023-08-28 | Stop reason: HOSPADM

## 2023-08-23 RX ORDER — 0.9 % SODIUM CHLORIDE 0.9 %
1000 INTRAVENOUS SOLUTION INTRAVENOUS ONCE
Status: COMPLETED | OUTPATIENT
Start: 2023-08-23 | End: 2023-08-23

## 2023-08-23 RX ADMIN — IPRATROPIUM BROMIDE AND ALBUTEROL SULFATE 1 DOSE: 2.5; .5 SOLUTION RESPIRATORY (INHALATION) at 21:09

## 2023-08-23 RX ADMIN — OXYCODONE HYDROCHLORIDE 5 MG: 5 TABLET ORAL at 19:52

## 2023-08-23 RX ADMIN — GABAPENTIN 300 MG: 300 CAPSULE ORAL at 22:15

## 2023-08-23 RX ADMIN — FLUTICASONE PROPIONATE 2 SPRAY: 50 SPRAY, METERED NASAL at 22:53

## 2023-08-23 RX ADMIN — MEROPENEM 1000 MG: 1 INJECTION, POWDER, FOR SOLUTION INTRAVENOUS at 19:15

## 2023-08-23 RX ADMIN — SODIUM CHLORIDE, PRESERVATIVE FREE 10 ML: 5 INJECTION INTRAVENOUS at 21:55

## 2023-08-23 RX ADMIN — ATORVASTATIN CALCIUM 40 MG: 40 TABLET, FILM COATED ORAL at 22:15

## 2023-08-23 RX ADMIN — SODIUM CHLORIDE 1000 ML: 9 INJECTION, SOLUTION INTRAVENOUS at 15:55

## 2023-08-23 RX ADMIN — SACUBITRIL AND VALSARTAN 1 TABLET: 24; 26 TABLET, FILM COATED ORAL at 22:15

## 2023-08-23 RX ADMIN — OXYCODONE HYDROCHLORIDE AND ACETAMINOPHEN 1000 MG: 500 TABLET ORAL at 22:14

## 2023-08-23 RX ADMIN — INSULIN GLARGINE 32 UNITS: 100 INJECTION, SOLUTION SUBCUTANEOUS at 22:21

## 2023-08-23 RX ADMIN — ENOXAPARIN SODIUM 40 MG: 100 INJECTION SUBCUTANEOUS at 21:54

## 2023-08-23 RX ADMIN — BUMETANIDE 2 MG: 1 TABLET ORAL at 22:14

## 2023-08-23 ASSESSMENT — PAIN SCALES - GENERAL
PAINLEVEL_OUTOF10: 10
PAINLEVEL_OUTOF10: 8
PAINLEVEL_OUTOF10: 10

## 2023-08-23 ASSESSMENT — PAIN - FUNCTIONAL ASSESSMENT
PAIN_FUNCTIONAL_ASSESSMENT: 0-10
PAIN_FUNCTIONAL_ASSESSMENT: 0-10

## 2023-08-23 ASSESSMENT — PAIN DESCRIPTION - LOCATION: LOCATION: BACK;LEG

## 2023-08-23 NOTE — WOUND CARE
Wound Clinic Physician Orders and Discharge Instructions  [address] [place] [address] S. [address]  Telephone: [phone]    NAME:  Ezequiel Sandy OF BIRTH:  1955  MEDICAL RECORD NUMBER:  [id number]  DATE:  8/23/2023      Return Appointment:  [] Dressing Supply Provider:   [x] Home Healthcare: Neptali Everett  [x] Return Appointment: 1 Week(s)  [] Nurse Visit:     [] Discharge from [facility] [] Healed        [] Refer to Provider:         [] Consult    Follow-up Information:  [x] Ordered Tests: PVR  TO BE DONE AT [facility]. PLEASE CALL [phone] TO SCHEDULE  [] Rx:   [x] Other: Dr. Kami Abdalla to due vasular procedure  OTHER: REFERRAL TO DR Katerin Mckeon - INFECTIOUS DISEASE- PATIENT TO SCHEDULE AND APPOINTMENT. Patient to go to the ER for IV antibiotics per ID    Wound Cleansing:   Do not scrub or use excessive force. Cleanse wound prior to applying a clean dressing with:  [x] Normal Saline OR   [] Keep Wound Dry in Shower     [] Wound Cleanser   [x] Cleanse wound with Mild Soap & Water    [] Other:       Topical Treatments:  Do not apply lotions, creams, or ointments to wound bed unless directed. [] Apply moisturizing lotion to skin surrounding the wound prior to dressing change.  [] Apply antifungal ointment to skin surrounding the wound prior to dressing change.  [] Apply thin film of moisture barrier ointment to skin immediately around wound.   [] Apply Betadine to skin immediately around wound   [] Other:      Dressings:           Wound Location   Left Leg  [x] Apply Primary Dressing:       [] MediHoney Gel [] MediHoney Alginate  [x] Calcium Alginate with Silver - SILVERCEL 2ND [] Calcium Alginate without silver   [] Collagen with silver [] Collagen without Silver    [] Santyl with moist saline gauze     [] Hydrofera Blue (cut to size and moistened with normal saline)  [] Hydrofera Blue Ready (cut to size)      [] Normal Saline wet to dry

## 2023-08-23 NOTE — DISCHARGE INSTRUCTIONS
Increase Protein: [] Other:     Activity:  [x] Activity as tolerated:    [] Patient has no activity restrictions      [] Strict Bedrest   [] Remain off Work      [] May return to full duty work                                     [] Return to work with restrictions     Physician:  [x] Dr. Dorian Alejandro  [] Dr. Dimitri Sanches  [] Dr. Debbie Zaman      Nurse Case Manger:  Dominicumang Tariq Information: Should you experience any significant changes in your wound(s) or have questions about your wound care, please contact the [facility] [employer] at [phone]. Our hours are Monday - Friday 8am - 4:30pm, closed all major holidays. If you need help with your wound outside these hours and cannot wait until we are again available, contact your PCP or go to the hospital emergency room. PLEASE NOTE: IF YOU ARE UNABLE TO OBTAIN WOUND SUPPLIES, CONTINUE TO USE THE SUPPLIES YOU HAVE AVAILABLE UNTIL YOU ARE ABLE TO REACH US. IT IS MOST IMPORTANT TO KEEP THE WOUND COVERED AT ALL TIMES.

## 2023-08-23 NOTE — WOUND CARE
[facility]   Medical Staff Progress Note     [address] RECORD NUMBER:  [id number]  AGE: 75 y.o. GENDER: male  : 1955  EPISODE DATE:  2023    Chief complaint and reason for visit:   LLE ulcer  Chief Complaint   Patient presents with    Wound Check     L Leg      Patient presenting for follow up evaluation of wound(s) per chief complaint. Subjective: Symptoms, wound related issues, or other pertinent wound history since last visit:   Large, deep ulceration of the left lateral and posterior leg. Patient was recommended IV antibiotics by Dr. Yocasta Luo, and the patient was advised to proceed to the emergency room today if the IV antibiotics have not been initiated, which they have not. Patient denies fever, he reports significant drainage. Wound 05/10/23 Leg Left #1 circ (Active)   Wound Image   23   Wound Etiology Venous 23   Dressing Status Clean;Dry; Intact 23   Wound Cleansed Soap and water 23   Dressing/Treatment Non adherent; Alginate with Ag;Gauze dressing/dressing sponge;ABD;Other (comment) 08/15/23 1227   Wound Length (cm) 14.5 cm 23 1422   Wound Width (cm) 15 cm 23 142   Wound Depth (cm) 0.2 cm 23 142   Wound Surface Area (cm^2) 217.5 cm^2 23 142   Change in Wound Size % (l*w) 70.41 23 142   Wound Volume (cm^3) 43.5 cm^3 23 142   Wound Healing % 70 23 1422   Post-Procedure Length (cm) 14 cm 23 1448   Post-Procedure Width (cm) 24 cm 23 1448   Post-Procedure Depth (cm) 0.3 cm 23 1448   Post-Procedure Surface Area (cm^2) 336 cm^2 23 1448   Post-Procedure Volume (cm^3) 100.8 cm^3 23 1448   Wound Assessment Slough;Granulation tissue 23   Drainage Amount Large (50-75% saturated) 23   Drainage Description Serosanguinous 23 142   Odor None 23 1425

## 2023-08-23 NOTE — ED TRIAGE NOTES
Arrives to ED with c/o shortness of breath, left sided chest pain, and coughing up large amounts of white sputum. Pt also states he just left the wound clinic for a wound on his left lower leg. Was told he needed to come to ED for IV ABX.      Hx pacemaker

## 2023-08-23 NOTE — ED NOTES
1521- Sepsis alert called. 1530- Sepsis alert cancelled per MD Gilliam at bedside. 1539- Sepsis alert called again due to lactic being 3.07 and MD at bedside.       Celine Segovia RN  08/23/23 1228

## 2023-08-24 ENCOUNTER — APPOINTMENT (OUTPATIENT)
Facility: HOSPITAL | Age: 68
DRG: 603 | End: 2023-08-24
Attending: INTERNAL MEDICINE
Payer: MEDICARE

## 2023-08-24 ENCOUNTER — APPOINTMENT (OUTPATIENT)
Facility: HOSPITAL | Age: 68
DRG: 603 | End: 2023-08-24
Attending: FAMILY MEDICINE
Payer: MEDICARE

## 2023-08-24 PROBLEM — I87.2 VENOUS STASIS DERMATITIS OF BOTH LOWER EXTREMITIES: Status: ACTIVE | Noted: 2023-08-24

## 2023-08-24 PROBLEM — I83.229 INFECTED STASIS ULCER, LEFT (HCC): Status: ACTIVE | Noted: 2023-08-24

## 2023-08-24 PROBLEM — M79.604 PAIN IN BOTH LOWER EXTREMITIES: Status: ACTIVE | Noted: 2023-08-24

## 2023-08-24 PROBLEM — M79.605 PAIN IN BOTH LOWER EXTREMITIES: Status: ACTIVE | Noted: 2023-08-24

## 2023-08-24 PROBLEM — L97.929 INFECTED STASIS ULCER, LEFT (HCC): Status: ACTIVE | Noted: 2023-08-24

## 2023-08-24 LAB
ALBUMIN SERPL-MCNC: 2.9 G/DL (ref 3.5–5)
ALBUMIN/GLOB SERPL: 0.7 (ref 1.1–2.2)
ALP SERPL-CCNC: 82 U/L (ref 45–117)
ALT SERPL-CCNC: 25 U/L (ref 12–78)
ANION GAP SERPL CALC-SCNC: 7 MMOL/L (ref 5–15)
AST SERPL W P-5'-P-CCNC: 15 U/L (ref 15–37)
BASOPHILS # BLD: 0 K/UL (ref 0–0.1)
BASOPHILS NFR BLD: 0 % (ref 0–1)
BILIRUB SERPL-MCNC: 0.3 MG/DL (ref 0.2–1)
BUN SERPL-MCNC: 20 MG/DL (ref 6–20)
BUN/CREAT SERPL: 17 (ref 12–20)
CA-I BLD-MCNC: 9.2 MG/DL (ref 8.5–10.1)
CHLORIDE SERPL-SCNC: 106 MMOL/L (ref 97–108)
CO2 SERPL-SCNC: 28 MMOL/L (ref 21–32)
CREAT SERPL-MCNC: 1.2 MG/DL (ref 0.7–1.3)
DIFFERENTIAL METHOD BLD: ABNORMAL
EKG ATRIAL RATE: 105 BPM
EKG DIAGNOSIS: NORMAL
EKG P AXIS: 80 DEGREES
EKG P-R INTERVAL: 156 MS
EKG Q-T INTERVAL: 376 MS
EKG QRS DURATION: 126 MS
EKG QTC CALCULATION (BAZETT): 496 MS
EKG R AXIS: 136 DEGREES
EKG T AXIS: -1 DEGREES
EKG VENTRICULAR RATE: 105 BPM
EOSINOPHIL # BLD: 0.3 K/UL (ref 0–0.4)
EOSINOPHIL NFR BLD: 4 % (ref 0–7)
ERYTHROCYTE [DISTWIDTH] IN BLOOD BY AUTOMATED COUNT: 12.8 % (ref 11.5–14.5)
EST. AVERAGE GLUCOSE BLD GHB EST-MCNC: 143 MG/DL
GLOBULIN SER CALC-MCNC: 4.2 G/DL (ref 2–4)
GLUCOSE BLD STRIP.AUTO-MCNC: 140 MG/DL (ref 65–100)
GLUCOSE BLD STRIP.AUTO-MCNC: 151 MG/DL (ref 65–100)
GLUCOSE BLD STRIP.AUTO-MCNC: 168 MG/DL (ref 65–100)
GLUCOSE BLD STRIP.AUTO-MCNC: 171 MG/DL (ref 65–100)
GLUCOSE SERPL-MCNC: 136 MG/DL (ref 65–100)
HBA1C MFR BLD: 6.6 % (ref 4–5.6)
HCT VFR BLD AUTO: 36.2 % (ref 36.6–50.3)
HGB BLD-MCNC: 11.4 G/DL (ref 12.1–17)
IMM GRANULOCYTES # BLD AUTO: 0 K/UL (ref 0–0.04)
IMM GRANULOCYTES NFR BLD AUTO: 0 % (ref 0–0.5)
LYMPHOCYTES # BLD: 1.5 K/UL (ref 0.8–3.5)
LYMPHOCYTES NFR BLD: 20 % (ref 12–49)
MCH RBC QN AUTO: 28.9 PG (ref 26–34)
MCHC RBC AUTO-ENTMCNC: 31.5 G/DL (ref 30–36.5)
MCV RBC AUTO: 91.6 FL (ref 80–99)
MONOCYTES # BLD: 0.7 K/UL (ref 0–1)
MONOCYTES NFR BLD: 10 % (ref 5–13)
NEUTS SEG # BLD: 5 K/UL (ref 1.8–8)
NEUTS SEG NFR BLD: 66 % (ref 32–75)
NRBC # BLD: 0 K/UL (ref 0–0.01)
NRBC BLD-RTO: 0 PER 100 WBC
PERFORMED BY:: ABNORMAL
PLATELET # BLD AUTO: 315 K/UL (ref 150–400)
PMV BLD AUTO: 10.7 FL (ref 8.9–12.9)
POTASSIUM SERPL-SCNC: 3.6 MMOL/L (ref 3.5–5.1)
PROT SERPL-MCNC: 7.1 G/DL (ref 6.4–8.2)
RBC # BLD AUTO: 3.95 M/UL (ref 4.1–5.7)
SODIUM SERPL-SCNC: 141 MMOL/L (ref 136–145)
WBC # BLD AUTO: 7.6 K/UL (ref 4.1–11.1)

## 2023-08-24 PROCEDURE — 99222 1ST HOSP IP/OBS MODERATE 55: CPT | Performed by: INTERNAL MEDICINE

## 2023-08-24 PROCEDURE — 82962 GLUCOSE BLOOD TEST: CPT

## 2023-08-24 PROCEDURE — 80053 COMPREHEN METABOLIC PANEL: CPT

## 2023-08-24 PROCEDURE — 6370000000 HC RX 637 (ALT 250 FOR IP): Performed by: INTERNAL MEDICINE

## 2023-08-24 PROCEDURE — 6370000000 HC RX 637 (ALT 250 FOR IP): Performed by: FAMILY MEDICINE

## 2023-08-24 PROCEDURE — 85025 COMPLETE CBC W/AUTO DIFF WBC: CPT

## 2023-08-24 PROCEDURE — 36415 COLL VENOUS BLD VENIPUNCTURE: CPT

## 2023-08-24 PROCEDURE — 1100000000 HC RM PRIVATE

## 2023-08-24 PROCEDURE — 6360000002 HC RX W HCPCS: Performed by: FAMILY MEDICINE

## 2023-08-24 PROCEDURE — 93970 EXTREMITY STUDY: CPT

## 2023-08-24 PROCEDURE — 93922 UPR/L XTREMITY ART 2 LEVELS: CPT | Performed by: SURGERY

## 2023-08-24 PROCEDURE — 93922 UPR/L XTREMITY ART 2 LEVELS: CPT

## 2023-08-24 PROCEDURE — 2580000003 HC RX 258: Performed by: FAMILY MEDICINE

## 2023-08-24 PROCEDURE — 93970 EXTREMITY STUDY: CPT | Performed by: SURGERY

## 2023-08-24 PROCEDURE — 6360000002 HC RX W HCPCS: Performed by: INTERNAL MEDICINE

## 2023-08-24 PROCEDURE — 94761 N-INVAS EAR/PLS OXIMETRY MLT: CPT

## 2023-08-24 PROCEDURE — 99232 SBSQ HOSP IP/OBS MODERATE 35: CPT | Performed by: INTERNAL MEDICINE

## 2023-08-24 PROCEDURE — 2700000000 HC OXYGEN THERAPY PER DAY

## 2023-08-24 PROCEDURE — 94640 AIRWAY INHALATION TREATMENT: CPT

## 2023-08-24 RX ORDER — OXYCODONE HYDROCHLORIDE 10 MG/1
10 TABLET ORAL EVERY 6 HOURS PRN
Status: DISCONTINUED | OUTPATIENT
Start: 2023-08-24 | End: 2023-08-28 | Stop reason: HOSPADM

## 2023-08-24 RX ORDER — MORPHINE SULFATE 4 MG/ML
3 INJECTION INTRAVENOUS ONCE
Status: COMPLETED | OUTPATIENT
Start: 2023-08-24 | End: 2023-08-24

## 2023-08-24 RX ORDER — BUDESONIDE AND FORMOTEROL FUMARATE DIHYDRATE 160; 4.5 UG/1; UG/1
2 AEROSOL RESPIRATORY (INHALATION)
Status: DISCONTINUED | OUTPATIENT
Start: 2023-08-24 | End: 2023-08-28 | Stop reason: HOSPADM

## 2023-08-24 RX ORDER — GABAPENTIN 400 MG/1
400 CAPSULE ORAL 4 TIMES DAILY
Status: DISCONTINUED | OUTPATIENT
Start: 2023-08-24 | End: 2023-08-28 | Stop reason: HOSPADM

## 2023-08-24 RX ADMIN — MEROPENEM 1000 MG: 1 INJECTION, POWDER, FOR SOLUTION INTRAVENOUS at 11:54

## 2023-08-24 RX ADMIN — OXYCODONE HYDROCHLORIDE 5 MG: 5 TABLET ORAL at 07:14

## 2023-08-24 RX ADMIN — MORPHINE SULFATE 3 MG: 4 INJECTION INTRAVENOUS at 13:36

## 2023-08-24 RX ADMIN — POTASSIUM CHLORIDE 10 MEQ: 750 TABLET, FILM COATED, EXTENDED RELEASE ORAL at 16:35

## 2023-08-24 RX ADMIN — ATORVASTATIN CALCIUM 40 MG: 40 TABLET, FILM COATED ORAL at 21:05

## 2023-08-24 RX ADMIN — Medication 2 PUFF: at 08:30

## 2023-08-24 RX ADMIN — MEROPENEM 1000 MG: 1 INJECTION, POWDER, FOR SOLUTION INTRAVENOUS at 21:03

## 2023-08-24 RX ADMIN — BUMETANIDE 2 MG: 1 TABLET ORAL at 08:46

## 2023-08-24 RX ADMIN — ASPIRIN 81 MG 81 MG: 81 TABLET ORAL at 08:46

## 2023-08-24 RX ADMIN — GABAPENTIN 400 MG: 400 CAPSULE ORAL at 16:35

## 2023-08-24 RX ADMIN — IPRATROPIUM BROMIDE AND ALBUTEROL SULFATE 1 DOSE: 2.5; .5 SOLUTION RESPIRATORY (INHALATION) at 13:19

## 2023-08-24 RX ADMIN — OXYCODONE HYDROCHLORIDE AND ACETAMINOPHEN 1000 MG: 500 TABLET ORAL at 21:05

## 2023-08-24 RX ADMIN — SACUBITRIL AND VALSARTAN 1 TABLET: 24; 26 TABLET, FILM COATED ORAL at 08:46

## 2023-08-24 RX ADMIN — ENOXAPARIN SODIUM 40 MG: 100 INJECTION SUBCUTANEOUS at 08:45

## 2023-08-24 RX ADMIN — FLUTICASONE PROPIONATE 2 SPRAY: 50 SPRAY, METERED NASAL at 21:07

## 2023-08-24 RX ADMIN — ENOXAPARIN SODIUM 40 MG: 100 INJECTION SUBCUTANEOUS at 21:03

## 2023-08-24 RX ADMIN — Medication 1 TABLET: at 08:52

## 2023-08-24 RX ADMIN — MEROPENEM 1000 MG: 1 INJECTION, POWDER, FOR SOLUTION INTRAVENOUS at 04:23

## 2023-08-24 RX ADMIN — IPRATROPIUM BROMIDE AND ALBUTEROL SULFATE 1 DOSE: 2.5; .5 SOLUTION RESPIRATORY (INHALATION) at 08:30

## 2023-08-24 RX ADMIN — POTASSIUM CHLORIDE 10 MEQ: 750 TABLET, FILM COATED, EXTENDED RELEASE ORAL at 11:54

## 2023-08-24 RX ADMIN — GABAPENTIN 300 MG: 300 CAPSULE ORAL at 08:46

## 2023-08-24 RX ADMIN — GABAPENTIN 400 MG: 400 CAPSULE ORAL at 21:05

## 2023-08-24 RX ADMIN — Medication 2 PUFF: at 22:27

## 2023-08-24 RX ADMIN — SODIUM CHLORIDE, PRESERVATIVE FREE 10 ML: 5 INJECTION INTRAVENOUS at 08:43

## 2023-08-24 RX ADMIN — POTASSIUM CHLORIDE 10 MEQ: 750 TABLET, FILM COATED, EXTENDED RELEASE ORAL at 21:05

## 2023-08-24 RX ADMIN — OXYCODONE HYDROCHLORIDE 10 MG: 10 TABLET ORAL at 22:31

## 2023-08-24 RX ADMIN — SODIUM CHLORIDE, PRESERVATIVE FREE 10 ML: 5 INJECTION INTRAVENOUS at 21:03

## 2023-08-24 RX ADMIN — OXYCODONE HYDROCHLORIDE 10 MG: 10 TABLET ORAL at 16:35

## 2023-08-24 RX ADMIN — GABAPENTIN 300 MG: 300 CAPSULE ORAL at 11:54

## 2023-08-24 RX ADMIN — BUMETANIDE 2 MG: 1 TABLET ORAL at 21:05

## 2023-08-24 RX ADMIN — OXYCODONE HYDROCHLORIDE 5 MG: 5 TABLET ORAL at 01:31

## 2023-08-24 RX ADMIN — POTASSIUM CHLORIDE 10 MEQ: 750 TABLET, FILM COATED, EXTENDED RELEASE ORAL at 08:46

## 2023-08-24 RX ADMIN — INSULIN GLARGINE 32 UNITS: 100 INJECTION, SOLUTION SUBCUTANEOUS at 16:48

## 2023-08-24 RX ADMIN — OXYCODONE HYDROCHLORIDE AND ACETAMINOPHEN 1000 MG: 500 TABLET ORAL at 08:48

## 2023-08-24 RX ADMIN — IPRATROPIUM BROMIDE AND ALBUTEROL SULFATE 1 DOSE: 2.5; .5 SOLUTION RESPIRATORY (INHALATION) at 22:20

## 2023-08-24 RX ADMIN — ACETAMINOPHEN 650 MG: 325 TABLET ORAL at 21:04

## 2023-08-24 RX ADMIN — COLCHICINE 0.6 MG: 0.6 TABLET ORAL at 08:46

## 2023-08-24 RX ADMIN — CETIRIZINE HYDROCHLORIDE 10 MG: 10 TABLET, FILM COATED ORAL at 08:49

## 2023-08-24 RX ADMIN — SACUBITRIL AND VALSARTAN 1 TABLET: 24; 26 TABLET, FILM COATED ORAL at 21:05

## 2023-08-24 ASSESSMENT — PAIN SCALES - GENERAL
PAINLEVEL_OUTOF10: 10
PAINLEVEL_OUTOF10: 10
PAINLEVEL_OUTOF10: 0
PAINLEVEL_OUTOF10: 10
PAINLEVEL_OUTOF10: 4
PAINLEVEL_OUTOF10: 3
PAINLEVEL_OUTOF10: 0
PAINLEVEL_OUTOF10: 7
PAINLEVEL_OUTOF10: 10
PAINLEVEL_OUTOF10: 0
PAINLEVEL_OUTOF10: 8
PAINLEVEL_OUTOF10: 3
PAINLEVEL_OUTOF10: 7

## 2023-08-24 ASSESSMENT — PAIN - FUNCTIONAL ASSESSMENT
PAIN_FUNCTIONAL_ASSESSMENT: PREVENTS OR INTERFERES SOME ACTIVE ACTIVITIES AND ADLS
PAIN_FUNCTIONAL_ASSESSMENT: PREVENTS OR INTERFERES SOME ACTIVE ACTIVITIES AND ADLS

## 2023-08-24 ASSESSMENT — PAIN DESCRIPTION - LOCATION
LOCATION: LEG;BACK
LOCATION: LEG
LOCATION: LEG
LOCATION: LEG;BACK
LOCATION: LEG
LOCATION: LEG

## 2023-08-24 ASSESSMENT — PAIN SCALES - WONG BAKER
WONGBAKER_NUMERICALRESPONSE: 0
WONGBAKER_NUMERICALRESPONSE: 2
WONGBAKER_NUMERICALRESPONSE: 2
WONGBAKER_NUMERICALRESPONSE: 0

## 2023-08-24 ASSESSMENT — PAIN DESCRIPTION - DESCRIPTORS
DESCRIPTORS: STABBING;SHARP;ACHING;THROBBING
DESCRIPTORS: STABBING
DESCRIPTORS: ACHING
DESCRIPTORS: ACHING
DESCRIPTORS: BURNING

## 2023-08-24 ASSESSMENT — PAIN DESCRIPTION - ORIENTATION
ORIENTATION: LEFT
ORIENTATION: LEFT;LOWER
ORIENTATION: LEFT

## 2023-08-24 ASSESSMENT — ENCOUNTER SYMPTOMS
SHORTNESS OF BREATH: 1
GASTROINTESTINAL NEGATIVE: 1

## 2023-08-24 NOTE — H&P
History and Physical    NAME:   Puneet Beasley   :  1955   MRN:  [id number]     Date/Time: 2023 11:04 AM    Patient PCP: Arley Estrada MD  ______________________________________________________________________       Subjective:     CHIEF COMPLAINT:     Wound check, chest pain    HISTORY OF PRESENT ILLNESS:       Patient is a 75y.o. year old male with a past medical history of COPD, DM, HTN, CHF, JAYESH, and CAD presented to the ED at the recommendation of wound care for a wound check. He has a wound on the left lower extremity that is very painful and was instructed to come to the ED over infection concerns. He denies fever, chills, nausea, vomiting. He does report significant pain in the left lower extremity from the foot to the knee. He also complained of non-radiating left sided chest pain at ED presentation that has since resolved. He also noted shortness of breath at ED presentation that was noted to be his baseline. Lactic acid was elevated at 3.07 in ED, has since decreased to 0.94. Per infectious disease provider seen on , P.aeruginosa has been repeatedly isolated from wound Cxs most recently , reported as resistant to quinolones.  XR ankle left  No acute abnormality.  XR tibia fibula left  No acute findings. Coarse superficial skin calcifications of uncertain significance/etiology.  XR chest portable  Possible left pleural effusion and overlying atelectasis versus infiltrate. Pulmonary vascular congestion with mild interstitial edema. Pacemaker.      Blood cultures no growth 6 hours      Past Medical History:   Diagnosis Date    Arthritis     CAD (coronary artery disease)     Chronic obstructive pulmonary disease (HCC)     Diabetes ([address])     Gout     Hypertension     Ill-defined condition     Sleep apnea         Past Surgical History:   Procedure Laterality Date    HX VEIN ABLATION ADHESIVE      ORTHOPEDIC SURGERY      PACEMAKER         Social History Historical Provider, MD   ascorbic acid (VITAMIN C) 500 MG tablet Take 2 tablets by mouth 2 times daily    Historical Provider, MD   aspirin 81 MG chewable tablet Take 1 tablet by mouth daily    Historical Provider, MD   atorvastatin (LIPITOR) 40 MG tablet Take 1 tablet by mouth nightly at bedtime. 4/24/23   Historical Provider, MD   B Complex Vitamins (VITAMIN B COMPLEX) TABS Take 1 tablet by mouth daily    Historical Provider, MD   colchicine (COLCRYS) 0.6 MG tablet Take 1 tablet by mouth daily 4/6/23   Historical Provider, MD   flunisolide (NASALIDE) 25 MCG/ACT (0.025%) SOLN 2 sprays 2 times daily 9/9/22   Historical Provider, MD   fluticasone-umeclidin-vilant (TRELEGY ELLIPTA) 971-31.3-61 MCG/ACT AEPB inhaler INHALE 1 INHALATION BY MOUTH ONCE DAILY 1/4/22   Historical Provider, MD   gabapentin (NEURONTIN) 300 MG capsule Take 1 capsule by mouth 4 times daily. 4/25/23   Historical Provider, MD   Insulin Glargine, 2 Unit Dial, (TOUJEO MAX SOLOSTAR) 300 UNIT/ML SOPN INJECT 80 UNITS SUBCUTANEOUSLY IN THE MORNING AND 32 IN THE EVENING 4/7/23   Historical Provider, MD   MM PEN NEEDLES 31G X 8 MM MISC USE 1 NEW PEN NEEDLE TO INJECT INSULIN SUBCUTANEOUSLY FIVE TIMES DAILY 2/27/23   Historical Provider, MD   loratadine (CLARITIN) 10 MG tablet Take 1 tablet by mouth daily  Patient not taking: Reported on 8/23/2023 11/1/21   Historical Provider, MD   nystatin (MYCOSTATIN) 813898 UNIT/GM cream as needed 3/28/22   Historical Provider, MD   oxyCODONE (ROXICODONE) 5 MG immediate release tablet Take 1 tablet by mouth 2 times daily.  4/25/23   Historical Provider, MD   vitamin E 400 UNIT capsule Take 2 capsules by mouth 2 times daily 6/8/21   Historical Provider, MD         Current Facility-Administered Medications:     budesonide-formoterol (SYMBICORT) 160-4.5 MCG/ACT inhaler 2 puff, 2 puff, Inhalation, BID RT, Federico Pino MD, 2 puff at 08/24/23 0830    [Held by provider] tiotropium (SPIRIVA RESPIMAT) 2.5 MCG/ACT inhaler 2

## 2023-08-24 NOTE — PLAN OF CARE
Problem: Discharge Planning  Goal: Discharge to home or other facility with appropriate resources  Outcome: Progressing     Problem: Pain  Goal: Verbalizes/displays adequate comfort level or baseline comfort level  8/24/2023 0901 by Lety Hendrix LPN  Outcome: Progressing  8/24/2023 0151 by Anny Charles RN  Outcome: Progressing     Problem: Skin/Tissue Integrity  Goal: Absence of new skin breakdown  8/24/2023 0901 by Lety Hendrix LPN  Outcome: Progressing  8/24/2023 0151 by Anny Charles RN  Outcome: Progressing     Problem: Safety - Adult  Goal: Free from fall injury  8/24/2023 0901 by Lety Hendrix LPN  Outcome: Progressing  8/24/2023 0151 by Anny Charles RN  Outcome: Progressing     Problem: ABCDS Injury Assessment  Goal: Absence of physical injury  8/24/2023 0901 by Lety Hendrix LPN  Outcome: Progressing  8/24/2023 0151 by Anny Charles RN  Outcome: Progressing     Problem: Respiratory - Adult  Goal: Achieves optimal ventilation and oxygenation  Outcome: Progressing     Problem: Skin/Tissue Integrity - Adult  Goal: Skin integrity remains intact  Outcome: Progressing  Flowsheets (Taken 8/24/2023 0855)  Skin Integrity Remains Intact: Monitor for areas of redness and/or skin breakdown  Goal: Incisions, wounds, or drain sites healing without S/S of infection  Outcome: Progressing     Problem: Chronic Conditions and Co-morbidities  Goal: Patient's chronic conditions and co-morbidity symptoms are monitored and maintained or improved  Outcome: Progressing  Flowsheets (Taken 8/24/2023 0855)  Care Plan - Patient's Chronic Conditions and Co-Morbidity Symptoms are Monitored and Maintained or Improved: Monitor and assess patient's chronic conditions and comorbid symptoms for stability, deterioration, or improvement

## 2023-08-24 NOTE — H&P
History and Physical    NAME:   Lesli Musa   :  1955   MRN:  [id number]     Date/Time: 2023 9:29 AM    Patient PCP: Rusty Mayer MD  ______________________________________________________________________       Subjective:     CHIEF COMPLAINT:     Wound check, chest pain    HISTORY OF PRESENT ILLNESS:       Patient is a 75y.o. year old male with a past medical history of COPD, DM, HTN, CHF, JAYESH, and CAD presented to the ED at the recommendation of wound care for a wound check. He has a wound on the left lower extremity that is very painful and was instructed to come to the ED over infection concerns. He denies fever, chills, nausea, vomiting. He does report significant pain in the left lower extremity from the foot to the knee. He also complained of non-radiating left sided chest pain at ED presentation that has since resolved. He also noted shortness of breath at ED presentation that was noted to be his baseline. Lactic acid was elevated at 3.07 in ED, has since decreased to 0.94. Per infectious disease provider seen on , P.aeruginosa has been repeatedly isolated from wound Cxs most recently , reported as resistant to quinolones.  XR ankle left  No acute abnormality.  XR tibia fibula left  No acute findings. Coarse superficial skin calcifications of uncertain significance/etiology.  XR chest portable  Possible left pleural effusion and overlying atelectasis versus infiltrate. Pulmonary vascular congestion with mild interstitial edema. Pacemaker.      Blood cultures no growth 6 hours      Past Medical History:   Diagnosis Date    Arthritis     CAD (coronary artery disease)     Chronic obstructive pulmonary disease (HCC)     Diabetes ([address])     Gout     Hypertension     Ill-defined condition     Sleep apnea         Past Surgical History:   Procedure Laterality Date    HX VEIN ABLATION ADHESIVE      ORTHOPEDIC SURGERY      PACEMAKER         Social History Tobacco Use    Smoking status: Never    Smokeless tobacco: Never   Substance Use Topics    Alcohol use: Not Currently        Family History   Problem Relation Age of Onset    Diabetes Mother     Heart Disease Father     Hypertension Father     Diabetes Sister     Diabetes Brother     Hypertension Mother     Heart Disease Mother     Kidney Disease Mother        Allergies   Allergen Reactions    Amitriptyline Angioedema    Naproxen      Other reaction(s): Unknown (comments)  Pt not sure of reaction    Sulfa Antibiotics Nausea And Vomiting        Prior to Admission medications    Medication Sig Start Date End Date Taking?  Authorizing Provider   bumetanide (BUMEX) 2 MG tablet Take 1 tablet by mouth 2 times daily 8/11/23   Historical Provider, MD   HUMALOG KWIKPEN 100 UNIT/ML SOPN ADMINISTER SUBCUTANEOSLY WITH EACH MEAL AFTER TESTING GLUCOSE AND USE SLIDING SCALE. 150-199=2 UNITS; 200-249=4 UNITS; 250-299=6 UNITS; 300-349= 8 UNITS; 350-399=10 UNITS; OVER 400 CALL MD 7/13/23   Historical Provider, MD   ENTRESTO 24-26 MG per tablet Take 1 tablet by mouth 2 times daily 8/9/23   Historical Provider, MD   LANCOSME SOLOSTAR 100 UNIT/ML injection pen  8/4/23   Historical Provider, MD   potassium chloride (MICRO-K) 10 MEQ extended release capsule Take 1 capsule by mouth 4 times daily    Historical Provider, MD   ipratropium 0.5 mg-albuterol 2.5 mg (DUONEB) 0.5-2.5 (3) MG/3ML SOLN nebulizer solution Inhale 3 mLs into the lungs every 6 hours while awake  Patient taking differently: Inhale 3 mLs into the lungs every 6 hours as needed 6/20/23   Kayleigh Pino MD   albuterol sulfate HFA (PROVENTIL;VENTOLIN;PROAIR) 108 (90 Base) MCG/ACT inhaler Inhale 1 puff into the lungs every 4 hours as needed 9/14/22   Historical Provider, MD   ascorbic acid (VITAMIN C) 500 MG tablet Take 2 tablets by mouth 2 times daily    Historical Provider, MD   aspirin 81 MG chewable tablet Take 1 tablet by mouth daily    Historical Provider, MD

## 2023-08-24 NOTE — WOUND CARE
IP WOUND CONSULT    [address] RECORD NUMBER:  [id number]  AGE: 75 y.o. GENDER: male  : 1955  TODAY'S DATE:  2023    GENERAL     [] Follow-up   [x] New Consult    Auugstus Hayes is a 75 y.o. male referred by:   [x] Physician  [] Nursing  [] Other:         PAST MEDICAL HISTORY    Past Medical History:   Diagnosis Date    Arthritis     CAD (coronary artery disease)     Chronic obstructive pulmonary disease ([address])     Diabetes ([address])     Gout     Hypertension     Ill-defined condition     Sleep apnea         PAST SURGICAL HISTORY    Past Surgical History:   Procedure Laterality Date    HX VEIN ABLATION ADHESIVE      ORTHOPEDIC SURGERY      PACEMAKER         FAMILY HISTORY    Family History   Problem Relation Age of Onset    Diabetes Mother     Heart Disease Father     Hypertension Father     Diabetes Sister     Diabetes Brother     Hypertension Mother     Heart Disease Mother     Kidney Disease Mother          ALLERGIES    Allergies   Allergen Reactions    Amitriptyline Angioedema    Naproxen      Other reaction(s): Unknown (comments)  Pt not sure of reaction    Sulfa Antibiotics Nausea And Vomiting       MEDICATIONS    No current facility-administered medications on file prior to encounter.      Current Outpatient Medications on File Prior to Encounter   Medication Sig Dispense Refill    bumetanide (BUMEX) 2 MG tablet Take 1 tablet by mouth 2 times daily      HUMALOG KWIKPEN 100 UNIT/ML SOPN ADMINISTER SUBCUTANEOSLY WITH EACH MEAL AFTER TESTING GLUCOSE AND USE SLIDING SCALE. 150-199=2 UNITS; 200-249=4 UNITS; 250-299=6 UNITS; 300-349= 8 UNITS; 350-399=10 UNITS; OVER 400 CALL MD      ENTRESTO 24-26 MG per tablet Take 1 tablet by mouth 2 times daily      LANTUS SOLOSTAR 100 UNIT/ML injection pen  (Patient not taking: Reported on 2023)      potassium chloride (MICRO-K) 10 MEQ extended release capsule Take 1 capsule by mouth 4 times daily      ipratropium 0.5 mg-albuterol 2.5 mg (DUONEB) teaching         [] N/A       Electronically signed by Yancy Cornell RN on 8/24/2023 at 3:40 PM

## 2023-08-24 NOTE — CARE COORDINATION
08/24/23 1315   Service Assessment   Patient Orientation Alert and Oriented   Cognition Alert   History Provided By Patient   Primary Caregiver Self   Support Systems Spouse/Significant Other;Children   Patient's Healthcare Decision Maker is: Legal Next of Kin   PCP Verified by CM Yes   Last Visit to PCP Within last 3 months   Prior Functional Level Independent in ADLs/IADLs   Current Functional Level Independent in ADLs/IADLs   Can patient return to prior living arrangement Yes   Ability to make needs known: Good   Family able to assist with home care needs: Yes   Would you like for me to discuss the discharge plan with any other family members/significant others, and if so, who? No   Financial Resources None   Community Resources None   Social/Functional History   Lives With Spouse;Daughter   Home Equipment Arlyne Croak, rolling;Hospital bed;Oxygen   Receives Help From Family   ADL Assistance Independent   Ambulation Assistance Independent   Transfer Assistance Independent   Occupation Retired   Services At/After Discharge   Mode of Transport at Discharge Other (see comment)   Confirm Follow Up Transport Family   Condition of Participation: Discharge Planning   The Patient and/or Patient Representative was provided with a Choice of Provider? Patient   Freedom of Choice list was provided with basic dialogue that supports the patient's individualized plan of care/goals, treatment preferences, and shares the quality data associated with the providers? Yes     CM met f/f with Pt, he confirmed that the information on the face sheet is correct. Pt stated that he lives with his wife and daughter. Pt has HH, but could not remember their name, CM attempted to contact Pt wife, no answer. Pt has home [email or web address], supplied by [facility], Pt is independent with ADL    Send RX to [facility] in [facility] will transport when D/C. Discussed with Pt that the doctor is going to order home IV ABX.    Pt

## 2023-08-25 LAB
ALBUMIN SERPL-MCNC: 2.7 G/DL (ref 3.5–5)
ALBUMIN/GLOB SERPL: 0.6 (ref 1.1–2.2)
ALP SERPL-CCNC: 73 U/L (ref 45–117)
ALT SERPL-CCNC: 22 U/L (ref 12–78)
ANION GAP SERPL CALC-SCNC: 5 MMOL/L (ref 5–15)
AST SERPL W P-5'-P-CCNC: 17 U/L (ref 15–37)
BILIRUB SERPL-MCNC: 0.4 MG/DL (ref 0.2–1)
BNP SERPL-MCNC: 615 PG/ML
BUN SERPL-MCNC: 21 MG/DL (ref 6–20)
BUN/CREAT SERPL: 17 (ref 12–20)
CA-I BLD-MCNC: 8.8 MG/DL (ref 8.5–10.1)
CHLORIDE SERPL-SCNC: 106 MMOL/L (ref 97–108)
CO2 SERPL-SCNC: 32 MMOL/L (ref 21–32)
CREAT SERPL-MCNC: 1.23 MG/DL (ref 0.7–1.3)
ECHO BSA: 2.88 M2
ECHO BSA: 2.88 M2
ERYTHROCYTE [DISTWIDTH] IN BLOOD BY AUTOMATED COUNT: 12.9 % (ref 11.5–14.5)
GLOBULIN SER CALC-MCNC: 4.2 G/DL (ref 2–4)
GLUCOSE BLD STRIP.AUTO-MCNC: 117 MG/DL (ref 65–100)
GLUCOSE BLD STRIP.AUTO-MCNC: 126 MG/DL (ref 65–100)
GLUCOSE BLD STRIP.AUTO-MCNC: 140 MG/DL (ref 65–100)
GLUCOSE BLD STRIP.AUTO-MCNC: 161 MG/DL (ref 65–100)
GLUCOSE SERPL-MCNC: 101 MG/DL (ref 65–100)
HCT VFR BLD AUTO: 35.8 % (ref 36.6–50.3)
HGB BLD-MCNC: 11.2 G/DL (ref 12.1–17)
MCH RBC QN AUTO: 29.1 PG (ref 26–34)
MCHC RBC AUTO-ENTMCNC: 31.3 G/DL (ref 30–36.5)
MCV RBC AUTO: 93 FL (ref 80–99)
NRBC # BLD: 0 K/UL (ref 0–0.01)
NRBC BLD-RTO: 0 PER 100 WBC
PERFORMED BY:: ABNORMAL
PLATELET # BLD AUTO: 300 K/UL (ref 150–400)
PMV BLD AUTO: 10.3 FL (ref 8.9–12.9)
POTASSIUM SERPL-SCNC: 3.6 MMOL/L (ref 3.5–5.1)
PROT SERPL-MCNC: 6.9 G/DL (ref 6.4–8.2)
RBC # BLD AUTO: 3.85 M/UL (ref 4.1–5.7)
SODIUM SERPL-SCNC: 143 MMOL/L (ref 136–145)
VAS LEFT ABI: 1.05
VAS LEFT ARM BP: 130 MMHG
VAS LEFT DORSALIS PEDIS BP: 155 MMHG
VAS LEFT PTA BP: 155 MMHG
VAS RIGHT ABI: 1.16
VAS RIGHT ARM BP: 148 MMHG
VAS RIGHT DORSALIS PEDIS BP: 172 MMHG
VAS RIGHT PTA BP: 167 MMHG
WBC # BLD AUTO: 7.3 K/UL (ref 4.1–11.1)

## 2023-08-25 PROCEDURE — 80053 COMPREHEN METABOLIC PANEL: CPT

## 2023-08-25 PROCEDURE — 2700000000 HC OXYGEN THERAPY PER DAY

## 2023-08-25 PROCEDURE — 93970 EXTREMITY STUDY: CPT | Performed by: SURGERY

## 2023-08-25 PROCEDURE — 6370000000 HC RX 637 (ALT 250 FOR IP): Performed by: INTERNAL MEDICINE

## 2023-08-25 PROCEDURE — 99232 SBSQ HOSP IP/OBS MODERATE 35: CPT | Performed by: INTERNAL MEDICINE

## 2023-08-25 PROCEDURE — 93922 UPR/L XTREMITY ART 2 LEVELS: CPT | Performed by: SURGERY

## 2023-08-25 PROCEDURE — 94640 AIRWAY INHALATION TREATMENT: CPT

## 2023-08-25 PROCEDURE — 1100000000 HC RM PRIVATE

## 2023-08-25 PROCEDURE — 2580000003 HC RX 258: Performed by: FAMILY MEDICINE

## 2023-08-25 PROCEDURE — 6370000000 HC RX 637 (ALT 250 FOR IP): Performed by: FAMILY MEDICINE

## 2023-08-25 PROCEDURE — 6360000002 HC RX W HCPCS: Performed by: FAMILY MEDICINE

## 2023-08-25 PROCEDURE — 83880 ASSAY OF NATRIURETIC PEPTIDE: CPT

## 2023-08-25 PROCEDURE — 85027 COMPLETE CBC AUTOMATED: CPT

## 2023-08-25 PROCEDURE — 36415 COLL VENOUS BLD VENIPUNCTURE: CPT

## 2023-08-25 PROCEDURE — 82962 GLUCOSE BLOOD TEST: CPT

## 2023-08-25 PROCEDURE — 94761 N-INVAS EAR/PLS OXIMETRY MLT: CPT

## 2023-08-25 RX ADMIN — Medication 1 TABLET: at 09:02

## 2023-08-25 RX ADMIN — IPRATROPIUM BROMIDE AND ALBUTEROL SULFATE 1 DOSE: 2.5; .5 SOLUTION RESPIRATORY (INHALATION) at 13:17

## 2023-08-25 RX ADMIN — ATORVASTATIN CALCIUM 40 MG: 40 TABLET, FILM COATED ORAL at 21:59

## 2023-08-25 RX ADMIN — MEROPENEM 1000 MG: 1 INJECTION, POWDER, FOR SOLUTION INTRAVENOUS at 04:23

## 2023-08-25 RX ADMIN — POTASSIUM CHLORIDE 10 MEQ: 750 TABLET, FILM COATED, EXTENDED RELEASE ORAL at 21:59

## 2023-08-25 RX ADMIN — SODIUM CHLORIDE, PRESERVATIVE FREE 10 ML: 5 INJECTION INTRAVENOUS at 09:03

## 2023-08-25 RX ADMIN — OXYCODONE HYDROCHLORIDE 10 MG: 10 TABLET ORAL at 21:59

## 2023-08-25 RX ADMIN — IPRATROPIUM BROMIDE AND ALBUTEROL SULFATE 1 DOSE: 2.5; .5 SOLUTION RESPIRATORY (INHALATION) at 08:44

## 2023-08-25 RX ADMIN — SACUBITRIL AND VALSARTAN 1 TABLET: 24; 26 TABLET, FILM COATED ORAL at 21:58

## 2023-08-25 RX ADMIN — FLUTICASONE PROPIONATE 2 SPRAY: 50 SPRAY, METERED NASAL at 09:03

## 2023-08-25 RX ADMIN — MEROPENEM 1000 MG: 1 INJECTION, POWDER, FOR SOLUTION INTRAVENOUS at 22:10

## 2023-08-25 RX ADMIN — OXYCODONE HYDROCHLORIDE AND ACETAMINOPHEN 1000 MG: 500 TABLET ORAL at 21:58

## 2023-08-25 RX ADMIN — GABAPENTIN 400 MG: 400 CAPSULE ORAL at 13:23

## 2023-08-25 RX ADMIN — BUMETANIDE 2 MG: 1 TABLET ORAL at 09:01

## 2023-08-25 RX ADMIN — OXYCODONE HYDROCHLORIDE AND ACETAMINOPHEN 1000 MG: 500 TABLET ORAL at 09:01

## 2023-08-25 RX ADMIN — GABAPENTIN 400 MG: 400 CAPSULE ORAL at 09:00

## 2023-08-25 RX ADMIN — SACUBITRIL AND VALSARTAN 1 TABLET: 24; 26 TABLET, FILM COATED ORAL at 09:01

## 2023-08-25 RX ADMIN — POTASSIUM CHLORIDE 10 MEQ: 750 TABLET, FILM COATED, EXTENDED RELEASE ORAL at 13:22

## 2023-08-25 RX ADMIN — SODIUM CHLORIDE, PRESERVATIVE FREE 10 ML: 5 INJECTION INTRAVENOUS at 22:00

## 2023-08-25 RX ADMIN — IPRATROPIUM BROMIDE AND ALBUTEROL SULFATE 1 DOSE: 2.5; .5 SOLUTION RESPIRATORY (INHALATION) at 19:35

## 2023-08-25 RX ADMIN — BUMETANIDE 2 MG: 1 TABLET ORAL at 21:59

## 2023-08-25 RX ADMIN — Medication 2 PUFF: at 08:44

## 2023-08-25 RX ADMIN — POTASSIUM CHLORIDE 10 MEQ: 750 TABLET, FILM COATED, EXTENDED RELEASE ORAL at 17:13

## 2023-08-25 RX ADMIN — POTASSIUM CHLORIDE 10 MEQ: 750 TABLET, FILM COATED, EXTENDED RELEASE ORAL at 09:01

## 2023-08-25 RX ADMIN — OXYCODONE HYDROCHLORIDE 10 MG: 10 TABLET ORAL at 13:22

## 2023-08-25 RX ADMIN — Medication 2 PUFF: at 19:35

## 2023-08-25 RX ADMIN — OXYCODONE HYDROCHLORIDE 10 MG: 10 TABLET ORAL at 06:31

## 2023-08-25 RX ADMIN — FLUTICASONE PROPIONATE 2 SPRAY: 50 SPRAY, METERED NASAL at 22:00

## 2023-08-25 RX ADMIN — GABAPENTIN 400 MG: 400 CAPSULE ORAL at 21:59

## 2023-08-25 RX ADMIN — MEROPENEM 1000 MG: 1 INJECTION, POWDER, FOR SOLUTION INTRAVENOUS at 11:38

## 2023-08-25 RX ADMIN — INSULIN GLARGINE 32 UNITS: 100 INJECTION, SOLUTION SUBCUTANEOUS at 17:13

## 2023-08-25 RX ADMIN — CETIRIZINE HYDROCHLORIDE 10 MG: 10 TABLET, FILM COATED ORAL at 09:02

## 2023-08-25 RX ADMIN — GABAPENTIN 400 MG: 400 CAPSULE ORAL at 17:13

## 2023-08-25 RX ADMIN — ASPIRIN 81 MG 81 MG: 81 TABLET ORAL at 09:01

## 2023-08-25 RX ADMIN — ENOXAPARIN SODIUM 40 MG: 100 INJECTION SUBCUTANEOUS at 21:59

## 2023-08-25 RX ADMIN — ENOXAPARIN SODIUM 40 MG: 100 INJECTION SUBCUTANEOUS at 09:03

## 2023-08-25 RX ADMIN — COLCHICINE 0.6 MG: 0.6 TABLET ORAL at 09:01

## 2023-08-25 ASSESSMENT — ENCOUNTER SYMPTOMS
SHORTNESS OF BREATH: 1
GASTROINTESTINAL NEGATIVE: 1

## 2023-08-25 ASSESSMENT — PULMONARY FUNCTION TESTS: PEFR_L/MIN: 18

## 2023-08-25 ASSESSMENT — PAIN SCALES - GENERAL
PAINLEVEL_OUTOF10: 3
PAINLEVEL_OUTOF10: 8
PAINLEVEL_OUTOF10: 8
PAINLEVEL_OUTOF10: 10

## 2023-08-25 ASSESSMENT — PAIN DESCRIPTION - LOCATION
LOCATION: LEG

## 2023-08-25 ASSESSMENT — PAIN DESCRIPTION - ORIENTATION
ORIENTATION: LEFT;LOWER
ORIENTATION: LEFT

## 2023-08-25 NOTE — CARE COORDINATION
Cost estimate for IV ABX is 144.15 a week. Patient will need IV ABX for three weeks. CM met with patient to discuss. Patient stated that he does not have any money. CM brought up SNF as Medicare will typically pay for IV ABX in a SNF, patient also reports that he is weak and \"does not move about very well\". Patient asked about [facility], CM explained that he would likely not be qualified for IRF and would likely be recc'd for SNF, patient asked for referral to be sent to [facility] as he had been there in the past and he wants a private room. CM explained that they may not have a bed available and asked for second choice, he stated he would go to [facility]. Patient has a trilogy, referrals sent via [facility] has accepted patient with trilogy pending bed availability. Patient will need a 3 mind night stay prior to d/c and PT/OT orders. Patient asked about possible payment plans for IV ABX at home, CM explained that they do have payment plans and would request they contact patient to discuss it with him. At this time patient unsure if he wants to go to SNF or home with IV ABX, if patient goes home, CM will need to speak with wife about who is current [facility] as patient did not remember the name of agency. CM continues to follow and monitor for needs. 2:35 PM - CM received call from [facility], they stated that they are current with patient for [facility], referral sent to them via [facility].

## 2023-08-25 NOTE — CONSULTS
Comprehensive Nutrition Assessment    Type and Reason for Visit:  Initial, Consult (Consult for possible malnutrition/wound healing supplements)    Nutrition Recommendations/Plan:   Continue 3CHO choices (45 gm/meal)/cardiac diet   Ensure HP 1x/day  Double protein portions   Continue to document PO/ONS intake in I/Os     Malnutrition Assessment:  Malnutrition Status:  No malnutrition (08/25/23 1535)    Context:  Acute Illness     Findings of the 6 clinical characteristics of malnutrition:  Energy Intake:  No significant decrease in energy intake  Weight Loss:  No significant weight loss     Body Fat Loss:  No significant body fat loss     Muscle Mass Loss:  No significant muscle mass loss    Fluid Accumulation:  No significant fluid accumulation     Strength:  Not Performed    Nutrition Assessment:    Presents to the ED over infection concerns and venous stasis ulcer - L leg. No significant wt loss per EMR, increasing wt trends noted. Good documented PO intakes (76 - 100%). Pt reported a good appetite and intakes during and PTA, stable wt trends, and noted that he does not consume fish r/t ABX use. Pt amenable to starting ONS and double protein portions to better meet needs. Labs: BUN 21, Gluc 101, Alb 2.7, H&H 11.2/35.8. Meds: Vit C, Lipitor, Bumex, Zyrtec, Lovenox, Lantus, Neurontin, Meropenem, Kcl, Vit B+C complex, Tylenol, Oxycodone. Nutrition Related Findings:    LBM 8/22, RLE/LLE non-pitting edema, no N/V/D/C nor c/s issues per pt report. No acute findings via observational NFPE. Wound Type: Venous Stasis (L leg)       Current Nutrition Intake & Therapies:    Average Meal Intake: 26-50% (per flowsheets)  Average Supplements Intake: None Ordered  ADULT DIET; Regular; 3 carb choices (45 gm/meal); Low Fat/Low Chol/High Fiber/2 gm Na    Anthropometric Measures:  Height: 180.3 cm (5' 10.98\")  Ideal Body Weight (IBW): 172 lbs (78 kg)       Current Body Weight: 365 lb 15.4 oz (166 kg), 212.8 % IBW.  Weight Source: Bed Scale  Current BMI (kg/m2): 51.1        Weight Adjustment For:  (adj bw: 221#)                 BMI Categories: Obese Class 3 (BMI 40.0 or greater)    Estimated Daily Nutrient Needs:  Energy Requirements Based On: Kcal/kg  Weight Used for Energy Requirements: Adjusted  Energy (kcal/day): 2510 kcals/day (25 kcals/kg Adj BW - wounds)  Weight Used for Protein Requirements: Adjusted  Protein (g/day): 100 - 120 g/day (1 - 1.2 g/kg - wounds)  Method Used for Fluid Requirements: 1 ml/kcal  Fluid (ml/day): 2510 ml/day (1ml/kcal)    Nutrition Diagnosis:   Increased nutrient needs related to increase demand for energy/nutrients as evidenced by wounds    Nutrition Interventions:   Food and/or Nutrient Delivery: Continue Current Diet, Start Oral Nutrition Supplement  Nutrition Education/Counseling: No recommendation at this time  Coordination of Nutrition Care: Continue to monitor while inpatient       Goals:     Goals: Meet at least 75% of estimated needs, within 7 days       Nutrition Monitoring and Evaluation:   Behavioral-Environmental Outcomes: None Identified  Food/Nutrient Intake Outcomes: Food and Nutrient Intake, Supplement Intake  Physical Signs/Symptoms Outcomes: Skin    Discharge Planning:     Too soon to determine     Real MARLENY Wong  Contact: [id number]

## 2023-08-25 NOTE — PROGRESS NOTES
PULMONARY NOTE  [facility]    Name: Karo Berg MRN: [id number]   : 1955 Hospital: [facility]   Date: 2023  Admission date: 2023 Hospital Day: 3       HPI:     Patient Active Problem List   Diagnosis    Onychomycosis    Non-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 ([address])    Idiopathic chronic venous hypertension of left lower extremity with ulcer ([address])    Ulcer of left foot, with fat layer exposed ([address])    Cellulitis    COVID-19    Venous ulcer ([address])    Hyperkeratosis    Localized edema    Type 2 diabetes mellitus with diabetic polyneuropathy, with long-term current use of insulin (HCC)    Cellulitis and abscess of right leg    Ulcer of right heel, with fat layer exposed ([address])    Calf ulcer, left, with fat layer exposed ([address])    Ankle ulcer, right, with fat layer exposed ([address])    Non-pressure chronic ulcer of other part of right lower leg with fat layer exposed ([address])    DEMARCO (acute kidney injury) ([address])    Back pain    Acute on chronic systolic CHF (congestive heart failure), NYHA class 2 ([address])    Shortness of breath    Acute on chronic congestive heart failure (HCC)    Wound infection    Venous stasis ulcer ([address])    Soft tissue infection    Sepsis ([address])    Venous stasis dermatitis of both lower extremities    Pain in both lower extremities    Infected stasis ulcer, left ([address])             [x] High complexity decision making was performed  [x] See my orders for details      Subjective/Initial History:     I was asked by Krish Croft MD to see Karo Berg  a 75 y.o.    male in consultation     Excerpts from admission 2023 or consult notes as follows:   54-year-old male came in because of chest pain past medical history of chronic hypoxic and hypercapnic respiratory failure chronically on home oxygen and noninvasive ventilator trilogy, COPD coronary disease hypertension diabetes mellitus, he has left lower extremity

## 2023-08-25 NOTE — PROGRESS NOTES
Vascular Access Team Consult Note: received consult for PICC line placement from Dr. Juan José Yanes for long-term antibiotics at discharge. Of note, client has history of left chest wall pacemaker/ICD > 6 months, as noted per chest x-ray Results Review. Called and spoke with Dr. Juan José Yanes and discussed; no plans for discharge today per Dr. Juan José Yanes, but possibly over the weekend. Paged Dr. Micaela Seip via [employer] to clarify line placement with blood cultures x 2 sets drawn 08/23/2023 at [phone] with preliminary negative result last updated per Result Review as \"no growth x 1 day\" on 08/24/2023 at 2138. Awaiting return call. Called case management to clarify stages of discharge planning to coordinate line placement; awaiting call back. Addendum 08/25/2023: Dr. Micaela Seip returned call and discussed line placement. Called and discussed with Dr. Juan José Yanes, blood cultures pending less than 48 hours at this time; client not discharging today. Telephone orders with read back received from Dr. Juan José Yanes for PICC line placement after 48 hours negative blood cultures per protocol. Plan to place PICC  to discharge after blood cultures result after 48 hours. Client has 2 working PIV's at this time per chart review for inpatient IV medication administration at this time.

## 2023-08-25 NOTE — PROGRESS NOTES
**OR** ondansetron (ZOFRAN) injection 4 mg, 4 mg, IntraVENous, Q6H PRN, Elridge Monday MD Alvarez    polyethylene glycol (GLYCOLAX) packet 17 g, 17 g, Oral, Daily PRN, Elridge Monday MD Alvarez    acetaminophen (TYLENOL) tablet 650 mg, 650 mg, Oral, Q6H PRN, 650 mg at 08/24/23 2104 **OR** acetaminophen (TYLENOL) suppository 650 mg, 650 mg, Rectal, Q6H PRN, Odalys Levy MD

## 2023-08-25 NOTE — PROGRESS NOTES
daily  Lipitor 40 mg daily  Symbicort 2 puffs twice daily  Bumetanide 2 mg twice daily  Zyrtec 10 mg daily  Colchicine 0.6 mg daily  Lovenox 40 mg subcu twice daily  Flonase 2 sprays twice daily  Gabapentin 400 mg four times daily  Lantus 32 units subcu daily  Duoneb every 6 hours  Meropenem IV 1 g every 8 hours  Potassium chloride 10 mEq four times daily  Entresto 24-26 twice daily    Continue Oxycodone 10 mg every 6 hours as needed for pain control     Continue sliding scale insulin    Continue to monitor labs          Current Facility-Administered Medications:     budesonide-formoterol (SYMBICORT) 160-4.5 MCG/ACT inhaler 2 puff, 2 puff, Inhalation, BID RT, Anastasiia Pino MD, 2 puff at 08/25/23 0844    [Held by provider] tiotropium (SPIRIVA RESPIMAT) 2.5 MCG/ACT inhaler 2 puff, 2 puff, Inhalation, Daily RT, Anastasiia Pino MD    oxyCODONE HCl (OXY-IR) immediate release tablet 10 mg, 10 mg, Oral, Q6H PRN, Anastasiia Pino MD, 10 mg at 08/25/23 0631    gabapentin (NEURONTIN) capsule 400 mg, 400 mg, Oral, 4x Daily, Sergei ASENCIO MD, 400 mg at 08/25/23 0900    albuterol sulfate HFA (PROVENTIL;VENTOLIN;PROAIR) 108 (90 Base) MCG/ACT inhaler 1 puff, 1 puff, Inhalation, Q4H PRN, Anastasiia Pino MD    ascorbic acid (VITAMIN C) tablet 1,000 mg, 1,000 mg, Oral, BID, Ed Pino MD, 1,000 mg at 08/25/23 0901    aspirin chewable tablet 81 mg, 81 mg, Oral, Daily, Ed Pino MD, 81 mg at 08/25/23 0901    atorvastatin (LIPITOR) tablet 40 mg, 40 mg, Oral, Nightly, Ed Pino MD, 40 mg at 08/24/23 2105    Vitamin B + C Complex TABS 1 tablet, 1 tablet, Oral, Daily, Ed Pino MD, 1 tablet at 08/25/23 0902    bumetanide (BUMEX) tablet 2 mg, 2 mg, Oral, BID, Ed Pino MD, 2 mg at 08/25/23 0901    colchicine (COLCRYS) tablet 0.6 mg, 0.6 mg, Oral, Daily, Ed Pino MD, 0.6 mg at 08/25/23 0901    sacubitril-valsartan (ENTRESTO) 24-26 MG per tablet 1 tablet, 1 tablet, Oral, BID, Anastasiia Reza 4 mg, 4 mg, IntraVENous, Q6H PRN, Moi Pino MD    polyethylene glycol (GLYCOLAX) packet 17 g, 17 g, Oral, Daily PRN, Moi Pino MD    acetaminophen (TYLENOL) tablet 650 mg, 650 mg, Oral, Q6H PRN, 650 mg at 08/24/23 1749 **OR** acetaminophen (TYLENOL) suppository 650 mg, 650 mg, Rectal, Q6H PRN, Kash Cortez MD

## 2023-08-26 PROBLEM — I82.4Y3 DEEP VEIN THROMBOSIS (DVT) OF PROXIMAL VEIN OF BOTH LOWER EXTREMITIES (HCC): Status: ACTIVE | Noted: 2023-08-26

## 2023-08-26 LAB
ALBUMIN SERPL-MCNC: 2.7 G/DL (ref 3.5–5)
ALBUMIN/GLOB SERPL: 0.6 (ref 1.1–2.2)
ALP SERPL-CCNC: 78 U/L (ref 45–117)
ALT SERPL-CCNC: 23 U/L (ref 12–78)
ANION GAP SERPL CALC-SCNC: 4 MMOL/L (ref 5–15)
AST SERPL W P-5'-P-CCNC: 20 U/L (ref 15–37)
BILIRUB SERPL-MCNC: 0.4 MG/DL (ref 0.2–1)
BUN SERPL-MCNC: 21 MG/DL (ref 6–20)
BUN/CREAT SERPL: 17 (ref 12–20)
CA-I BLD-MCNC: 9 MG/DL (ref 8.5–10.1)
CHLORIDE SERPL-SCNC: 106 MMOL/L (ref 97–108)
CO2 SERPL-SCNC: 30 MMOL/L (ref 21–32)
CREAT SERPL-MCNC: 1.26 MG/DL (ref 0.7–1.3)
ERYTHROCYTE [DISTWIDTH] IN BLOOD BY AUTOMATED COUNT: 12.9 % (ref 11.5–14.5)
GLOBULIN SER CALC-MCNC: 4.4 G/DL (ref 2–4)
GLUCOSE BLD STRIP.AUTO-MCNC: 150 MG/DL (ref 65–100)
GLUCOSE BLD STRIP.AUTO-MCNC: 161 MG/DL (ref 65–100)
GLUCOSE BLD STRIP.AUTO-MCNC: 188 MG/DL (ref 65–100)
GLUCOSE BLD STRIP.AUTO-MCNC: 205 MG/DL (ref 65–100)
GLUCOSE SERPL-MCNC: 137 MG/DL (ref 65–100)
HCT VFR BLD AUTO: 37.2 % (ref 36.6–50.3)
HGB BLD-MCNC: 11.5 G/DL (ref 12.1–17)
MCH RBC QN AUTO: 28.7 PG (ref 26–34)
MCHC RBC AUTO-ENTMCNC: 30.9 G/DL (ref 30–36.5)
MCV RBC AUTO: 92.8 FL (ref 80–99)
NRBC # BLD: 0 K/UL (ref 0–0.01)
NRBC BLD-RTO: 0 PER 100 WBC
PERFORMED BY:: ABNORMAL
PLATELET # BLD AUTO: 305 K/UL (ref 150–400)
PMV BLD AUTO: 10.3 FL (ref 8.9–12.9)
POTASSIUM SERPL-SCNC: 4.2 MMOL/L (ref 3.5–5.1)
PROT SERPL-MCNC: 7.1 G/DL (ref 6.4–8.2)
RBC # BLD AUTO: 4.01 M/UL (ref 4.1–5.7)
SODIUM SERPL-SCNC: 140 MMOL/L (ref 136–145)
WBC # BLD AUTO: 8.4 K/UL (ref 4.1–11.1)

## 2023-08-26 PROCEDURE — 82962 GLUCOSE BLOOD TEST: CPT

## 2023-08-26 PROCEDURE — 36415 COLL VENOUS BLD VENIPUNCTURE: CPT

## 2023-08-26 PROCEDURE — 6370000000 HC RX 637 (ALT 250 FOR IP): Performed by: FAMILY MEDICINE

## 2023-08-26 PROCEDURE — 97162 PT EVAL MOD COMPLEX 30 MIN: CPT

## 2023-08-26 PROCEDURE — 6360000002 HC RX W HCPCS: Performed by: INTERNAL MEDICINE

## 2023-08-26 PROCEDURE — 2700000000 HC OXYGEN THERAPY PER DAY

## 2023-08-26 PROCEDURE — 80053 COMPREHEN METABOLIC PANEL: CPT

## 2023-08-26 PROCEDURE — 99232 SBSQ HOSP IP/OBS MODERATE 35: CPT | Performed by: INTERNAL MEDICINE

## 2023-08-26 PROCEDURE — 2709999900 HC NON-CHARGEABLE SUPPLY

## 2023-08-26 PROCEDURE — C1894 INTRO/SHEATH, NON-LASER: HCPCS

## 2023-08-26 PROCEDURE — 02HV33Z INSERTION OF INFUSION DEVICE INTO SUPERIOR VENA CAVA, PERCUTANEOUS APPROACH: ICD-10-PCS | Performed by: FAMILY MEDICINE

## 2023-08-26 PROCEDURE — 85027 COMPLETE CBC AUTOMATED: CPT

## 2023-08-26 PROCEDURE — 6370000000 HC RX 637 (ALT 250 FOR IP): Performed by: INTERNAL MEDICINE

## 2023-08-26 PROCEDURE — 94640 AIRWAY INHALATION TREATMENT: CPT

## 2023-08-26 PROCEDURE — 2580000003 HC RX 258: Performed by: FAMILY MEDICINE

## 2023-08-26 PROCEDURE — 6360000002 HC RX W HCPCS: Performed by: FAMILY MEDICINE

## 2023-08-26 PROCEDURE — 97530 THERAPEUTIC ACTIVITIES: CPT

## 2023-08-26 PROCEDURE — 1100000000 HC RM PRIVATE

## 2023-08-26 PROCEDURE — C1751 CATH, INF, PER/CENT/MIDLINE: HCPCS

## 2023-08-26 PROCEDURE — 36573 INSJ PICC RS&I 5 YR+: CPT

## 2023-08-26 RX ORDER — DEXAMETHASONE SODIUM PHOSPHATE 4 MG/ML
4 INJECTION, SOLUTION INTRA-ARTICULAR; INTRALESIONAL; INTRAMUSCULAR; INTRAVENOUS; SOFT TISSUE EVERY 12 HOURS
Status: COMPLETED | OUTPATIENT
Start: 2023-08-26 | End: 2023-08-27

## 2023-08-26 RX ORDER — B-COMPLEX WITH VITAMIN C
1 TABLET ORAL DAILY
Status: DISCONTINUED | OUTPATIENT
Start: 2023-08-26 | End: 2023-08-28 | Stop reason: HOSPADM

## 2023-08-26 RX ADMIN — IPRATROPIUM BROMIDE AND ALBUTEROL SULFATE 1 DOSE: 2.5; .5 SOLUTION RESPIRATORY (INHALATION) at 06:51

## 2023-08-26 RX ADMIN — SODIUM CHLORIDE, PRESERVATIVE FREE 10 ML: 5 INJECTION INTRAVENOUS at 21:10

## 2023-08-26 RX ADMIN — SACUBITRIL AND VALSARTAN 1 TABLET: 24; 26 TABLET, FILM COATED ORAL at 21:08

## 2023-08-26 RX ADMIN — POTASSIUM CHLORIDE 10 MEQ: 750 TABLET, FILM COATED, EXTENDED RELEASE ORAL at 13:48

## 2023-08-26 RX ADMIN — BUMETANIDE 2 MG: 1 TABLET ORAL at 09:22

## 2023-08-26 RX ADMIN — IPRATROPIUM BROMIDE AND ALBUTEROL SULFATE 1 DOSE: 2.5; .5 SOLUTION RESPIRATORY (INHALATION) at 13:35

## 2023-08-26 RX ADMIN — FLUTICASONE PROPIONATE 2 SPRAY: 50 SPRAY, METERED NASAL at 09:23

## 2023-08-26 RX ADMIN — POTASSIUM CHLORIDE 10 MEQ: 750 TABLET, FILM COATED, EXTENDED RELEASE ORAL at 17:32

## 2023-08-26 RX ADMIN — SACUBITRIL AND VALSARTAN 1 TABLET: 24; 26 TABLET, FILM COATED ORAL at 09:22

## 2023-08-26 RX ADMIN — GABAPENTIN 400 MG: 400 CAPSULE ORAL at 09:22

## 2023-08-26 RX ADMIN — ACETAMINOPHEN 650 MG: 325 TABLET ORAL at 09:21

## 2023-08-26 RX ADMIN — Medication 1 TABLET: at 10:06

## 2023-08-26 RX ADMIN — DEXAMETHASONE SODIUM PHOSPHATE 4 MG: 4 INJECTION INTRA-ARTICULAR; INTRALESIONAL; INTRAMUSCULAR; INTRAVENOUS; SOFT TISSUE at 17:47

## 2023-08-26 RX ADMIN — GABAPENTIN 400 MG: 400 CAPSULE ORAL at 17:32

## 2023-08-26 RX ADMIN — GABAPENTIN 400 MG: 400 CAPSULE ORAL at 13:48

## 2023-08-26 RX ADMIN — MEROPENEM 1000 MG: 1 INJECTION, POWDER, FOR SOLUTION INTRAVENOUS at 04:10

## 2023-08-26 RX ADMIN — MEROPENEM 1000 MG: 1 INJECTION, POWDER, FOR SOLUTION INTRAVENOUS at 20:08

## 2023-08-26 RX ADMIN — POTASSIUM CHLORIDE 10 MEQ: 750 TABLET, FILM COATED, EXTENDED RELEASE ORAL at 09:22

## 2023-08-26 RX ADMIN — INSULIN GLARGINE 32 UNITS: 100 INJECTION, SOLUTION SUBCUTANEOUS at 17:33

## 2023-08-26 RX ADMIN — SODIUM CHLORIDE, PRESERVATIVE FREE 10 ML: 5 INJECTION INTRAVENOUS at 09:23

## 2023-08-26 RX ADMIN — Medication 2 PUFF: at 06:51

## 2023-08-26 RX ADMIN — Medication 2 PUFF: at 19:51

## 2023-08-26 RX ADMIN — FLUTICASONE PROPIONATE 2 SPRAY: 50 SPRAY, METERED NASAL at 21:09

## 2023-08-26 RX ADMIN — CETIRIZINE HYDROCHLORIDE 10 MG: 10 TABLET, FILM COATED ORAL at 09:22

## 2023-08-26 RX ADMIN — POTASSIUM CHLORIDE 10 MEQ: 750 TABLET, FILM COATED, EXTENDED RELEASE ORAL at 21:10

## 2023-08-26 RX ADMIN — OXYCODONE HYDROCHLORIDE 10 MG: 10 TABLET ORAL at 04:10

## 2023-08-26 RX ADMIN — IPRATROPIUM BROMIDE AND ALBUTEROL SULFATE 1 DOSE: 2.5; .5 SOLUTION RESPIRATORY (INHALATION) at 19:51

## 2023-08-26 RX ADMIN — ENOXAPARIN SODIUM 40 MG: 100 INJECTION SUBCUTANEOUS at 21:09

## 2023-08-26 RX ADMIN — OXYCODONE HYDROCHLORIDE AND ACETAMINOPHEN 1000 MG: 500 TABLET ORAL at 09:22

## 2023-08-26 RX ADMIN — GABAPENTIN 400 MG: 400 CAPSULE ORAL at 21:09

## 2023-08-26 RX ADMIN — OXYCODONE HYDROCHLORIDE AND ACETAMINOPHEN 1000 MG: 500 TABLET ORAL at 21:08

## 2023-08-26 RX ADMIN — ENOXAPARIN SODIUM 40 MG: 100 INJECTION SUBCUTANEOUS at 09:23

## 2023-08-26 RX ADMIN — ATORVASTATIN CALCIUM 40 MG: 40 TABLET, FILM COATED ORAL at 21:09

## 2023-08-26 RX ADMIN — COLCHICINE 0.6 MG: 0.6 TABLET ORAL at 09:22

## 2023-08-26 RX ADMIN — OXYCODONE HYDROCHLORIDE 10 MG: 10 TABLET ORAL at 23:02

## 2023-08-26 RX ADMIN — BUMETANIDE 2 MG: 1 TABLET ORAL at 21:08

## 2023-08-26 RX ADMIN — OXYCODONE HYDROCHLORIDE 10 MG: 10 TABLET ORAL at 17:32

## 2023-08-26 RX ADMIN — ASPIRIN 81 MG 81 MG: 81 TABLET ORAL at 09:22

## 2023-08-26 RX ADMIN — MEROPENEM 1000 MG: 1 INJECTION, POWDER, FOR SOLUTION INTRAVENOUS at 13:48

## 2023-08-26 RX ADMIN — OXYCODONE HYDROCHLORIDE 10 MG: 10 TABLET ORAL at 10:06

## 2023-08-26 ASSESSMENT — PAIN DESCRIPTION - ORIENTATION
ORIENTATION: RIGHT;LEFT
ORIENTATION: LEFT

## 2023-08-26 ASSESSMENT — PAIN SCALES - GENERAL
PAINLEVEL_OUTOF10: 10
PAINLEVEL_OUTOF10: 7
PAINLEVEL_OUTOF10: 9
PAINLEVEL_OUTOF10: 9

## 2023-08-26 ASSESSMENT — PAIN DESCRIPTION - DESCRIPTORS
DESCRIPTORS: STABBING
DESCRIPTORS: ACHING

## 2023-08-26 ASSESSMENT — PAIN DESCRIPTION - LOCATION
LOCATION: LEG
LOCATION: LEG

## 2023-08-26 ASSESSMENT — ENCOUNTER SYMPTOMS
SHORTNESS OF BREATH: 1
GASTROINTESTINAL NEGATIVE: 1

## 2023-08-26 ASSESSMENT — PAIN SCALES - WONG BAKER: WONGBAKER_NUMERICALRESPONSE: 0

## 2023-08-26 NOTE — PROGRESS NOTES
PULMONARY NOTE  [facility]    Name: Ehsan Spangler MRN: [id number]   : 1955 Hospital: [facility]   Date: 2023  Admission date: 2023 Hospital Day: 4       HPI:     Patient Active Problem List   Diagnosis    Onychomycosis    Non-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 ([address])    Idiopathic chronic venous hypertension of left lower extremity with ulcer ([address])    Ulcer of left foot, with fat layer exposed ([address])    Cellulitis    COVID-19    Venous ulcer ([address])    Hyperkeratosis    Localized edema    Type 2 diabetes mellitus with diabetic polyneuropathy, with long-term current use of insulin ([address])    Cellulitis and abscess of right leg    Ulcer of right heel, with fat layer exposed ([address])    Calf ulcer, left, with fat layer exposed ([address])    Ankle ulcer, right, with fat layer exposed ([address])    Non-pressure chronic ulcer of other part of right lower leg with fat layer exposed ([address])    DEMARCO (acute kidney injury) ([address])    Back pain    Acute on chronic systolic CHF (congestive heart failure), NYHA class 2 ([address])    Shortness of breath    Acute on chronic congestive heart failure (HCC)    Wound infection    Venous stasis ulcer ([address])    Soft tissue infection    Sepsis ([address])    Venous stasis dermatitis of both lower extremities    Pain in both lower extremities    Infected stasis ulcer, left ([address])    Deep vein thrombosis (DVT) of proximal vein of both lower extremities ([address])             [x] High complexity decision making was performed  [x] See my orders for details      Subjective/Initial History:     I was asked by Kaushal Alvarenga MD to see Ehsan Spangler  a 75 y.o.    male in consultation     Excerpts from admission 2023 or consult notes as follows:   69-year-old male came in because of chest pain past medical history of chronic hypoxic and hypercapnic respiratory failure chronically on home oxygen and noninvasive ventilator trilogy, COPD

## 2023-08-27 ENCOUNTER — APPOINTMENT (OUTPATIENT)
Facility: HOSPITAL | Age: 68
DRG: 603 | End: 2023-08-27
Payer: MEDICARE

## 2023-08-27 LAB
GLUCOSE BLD STRIP.AUTO-MCNC: 215 MG/DL (ref 65–100)
GLUCOSE BLD STRIP.AUTO-MCNC: 335 MG/DL (ref 65–100)
GLUCOSE BLD STRIP.AUTO-MCNC: 401 MG/DL (ref 65–100)
GLUCOSE BLD STRIP.AUTO-MCNC: 425 MG/DL (ref 65–100)
GLUCOSE BLD STRIP.AUTO-MCNC: 450 MG/DL (ref 65–100)
PERFORMED BY:: ABNORMAL

## 2023-08-27 PROCEDURE — 6370000000 HC RX 637 (ALT 250 FOR IP): Performed by: INTERNAL MEDICINE

## 2023-08-27 PROCEDURE — 6370000000 HC RX 637 (ALT 250 FOR IP): Performed by: FAMILY MEDICINE

## 2023-08-27 PROCEDURE — 99232 SBSQ HOSP IP/OBS MODERATE 35: CPT | Performed by: INTERNAL MEDICINE

## 2023-08-27 PROCEDURE — 6360000002 HC RX W HCPCS: Performed by: FAMILY MEDICINE

## 2023-08-27 PROCEDURE — 94761 N-INVAS EAR/PLS OXIMETRY MLT: CPT

## 2023-08-27 PROCEDURE — 6360000002 HC RX W HCPCS: Performed by: INTERNAL MEDICINE

## 2023-08-27 PROCEDURE — 94640 AIRWAY INHALATION TREATMENT: CPT

## 2023-08-27 PROCEDURE — 2700000000 HC OXYGEN THERAPY PER DAY

## 2023-08-27 PROCEDURE — 82962 GLUCOSE BLOOD TEST: CPT

## 2023-08-27 PROCEDURE — 1100000000 HC RM PRIVATE

## 2023-08-27 PROCEDURE — 2580000003 HC RX 258: Performed by: FAMILY MEDICINE

## 2023-08-27 RX ORDER — INSULIN GLARGINE 100 [IU]/ML
30 INJECTION, SOLUTION SUBCUTANEOUS ONCE
Status: COMPLETED | OUTPATIENT
Start: 2023-08-27 | End: 2023-08-27

## 2023-08-27 RX ORDER — INSULIN LISPRO 100 [IU]/ML
10 INJECTION, SOLUTION INTRAVENOUS; SUBCUTANEOUS ONCE
Status: COMPLETED | OUTPATIENT
Start: 2023-08-27 | End: 2023-08-27

## 2023-08-27 RX ORDER — DEXAMETHASONE SODIUM PHOSPHATE 4 MG/ML
4 INJECTION, SOLUTION INTRA-ARTICULAR; INTRALESIONAL; INTRAMUSCULAR; INTRAVENOUS; SOFT TISSUE EVERY 12 HOURS
Status: COMPLETED | OUTPATIENT
Start: 2023-08-27 | End: 2023-08-27

## 2023-08-27 RX ORDER — INSULIN LISPRO 100 [IU]/ML
20 INJECTION, SOLUTION INTRAVENOUS; SUBCUTANEOUS ONCE
Status: COMPLETED | OUTPATIENT
Start: 2023-08-27 | End: 2023-08-27

## 2023-08-27 RX ADMIN — OXYCODONE HYDROCHLORIDE 10 MG: 10 TABLET ORAL at 04:46

## 2023-08-27 RX ADMIN — COLCHICINE 0.6 MG: 0.6 TABLET ORAL at 09:30

## 2023-08-27 RX ADMIN — OXYCODONE HYDROCHLORIDE AND ACETAMINOPHEN 1000 MG: 500 TABLET ORAL at 21:04

## 2023-08-27 RX ADMIN — IPRATROPIUM BROMIDE AND ALBUTEROL SULFATE 1 DOSE: 2.5; .5 SOLUTION RESPIRATORY (INHALATION) at 20:21

## 2023-08-27 RX ADMIN — MEROPENEM 1000 MG: 1 INJECTION, POWDER, FOR SOLUTION INTRAVENOUS at 20:30

## 2023-08-27 RX ADMIN — FLUTICASONE PROPIONATE 2 SPRAY: 50 SPRAY, METERED NASAL at 09:15

## 2023-08-27 RX ADMIN — OXYCODONE HYDROCHLORIDE 10 MG: 10 TABLET ORAL at 12:52

## 2023-08-27 RX ADMIN — Medication 1 TABLET: at 09:13

## 2023-08-27 RX ADMIN — INSULIN LISPRO 4 UNITS: 100 INJECTION, SOLUTION INTRAVENOUS; SUBCUTANEOUS at 09:13

## 2023-08-27 RX ADMIN — MEROPENEM 1000 MG: 1 INJECTION, POWDER, FOR SOLUTION INTRAVENOUS at 12:42

## 2023-08-27 RX ADMIN — IPRATROPIUM BROMIDE AND ALBUTEROL SULFATE 1 DOSE: 2.5; .5 SOLUTION RESPIRATORY (INHALATION) at 13:59

## 2023-08-27 RX ADMIN — BUMETANIDE 2 MG: 1 TABLET ORAL at 09:06

## 2023-08-27 RX ADMIN — GABAPENTIN 400 MG: 400 CAPSULE ORAL at 17:22

## 2023-08-27 RX ADMIN — MEROPENEM 1000 MG: 1 INJECTION, POWDER, FOR SOLUTION INTRAVENOUS at 04:30

## 2023-08-27 RX ADMIN — Medication 2 PUFF: at 20:21

## 2023-08-27 RX ADMIN — ENOXAPARIN SODIUM 40 MG: 100 INJECTION SUBCUTANEOUS at 21:01

## 2023-08-27 RX ADMIN — ENOXAPARIN SODIUM 40 MG: 100 INJECTION SUBCUTANEOUS at 09:05

## 2023-08-27 RX ADMIN — POTASSIUM CHLORIDE 10 MEQ: 750 TABLET, FILM COATED, EXTENDED RELEASE ORAL at 12:52

## 2023-08-27 RX ADMIN — Medication 2 PUFF: at 08:41

## 2023-08-27 RX ADMIN — GABAPENTIN 400 MG: 400 CAPSULE ORAL at 12:52

## 2023-08-27 RX ADMIN — INSULIN LISPRO 20 UNITS: 100 INJECTION, SOLUTION INTRAVENOUS; SUBCUTANEOUS at 17:22

## 2023-08-27 RX ADMIN — CETIRIZINE HYDROCHLORIDE 10 MG: 10 TABLET, FILM COATED ORAL at 09:05

## 2023-08-27 RX ADMIN — POTASSIUM CHLORIDE 10 MEQ: 750 TABLET, FILM COATED, EXTENDED RELEASE ORAL at 17:22

## 2023-08-27 RX ADMIN — BUMETANIDE 2 MG: 1 TABLET ORAL at 21:00

## 2023-08-27 RX ADMIN — SACUBITRIL AND VALSARTAN 1 TABLET: 24; 26 TABLET, FILM COATED ORAL at 09:06

## 2023-08-27 RX ADMIN — INSULIN GLARGINE 30 UNITS: 100 INJECTION, SOLUTION SUBCUTANEOUS at 21:15

## 2023-08-27 RX ADMIN — DEXAMETHASONE SODIUM PHOSPHATE 4 MG: 4 INJECTION, SOLUTION INTRAMUSCULAR; INTRAVENOUS at 22:04

## 2023-08-27 RX ADMIN — ASPIRIN 81 MG 81 MG: 81 TABLET ORAL at 09:06

## 2023-08-27 RX ADMIN — INSULIN GLARGINE 32 UNITS: 100 INJECTION, SOLUTION SUBCUTANEOUS at 17:23

## 2023-08-27 RX ADMIN — GABAPENTIN 400 MG: 400 CAPSULE ORAL at 21:00

## 2023-08-27 RX ADMIN — DEXAMETHASONE SODIUM PHOSPHATE 4 MG: 4 INJECTION INTRA-ARTICULAR; INTRALESIONAL; INTRAMUSCULAR; INTRAVENOUS; SOFT TISSUE at 04:42

## 2023-08-27 RX ADMIN — DEXAMETHASONE SODIUM PHOSPHATE 4 MG: 4 INJECTION, SOLUTION INTRAMUSCULAR; INTRAVENOUS at 12:42

## 2023-08-27 RX ADMIN — POTASSIUM CHLORIDE 10 MEQ: 750 TABLET, FILM COATED, EXTENDED RELEASE ORAL at 21:00

## 2023-08-27 RX ADMIN — ATORVASTATIN CALCIUM 40 MG: 40 TABLET, FILM COATED ORAL at 21:00

## 2023-08-27 RX ADMIN — FLUTICASONE PROPIONATE 2 SPRAY: 50 SPRAY, METERED NASAL at 21:02

## 2023-08-27 RX ADMIN — SACUBITRIL AND VALSARTAN 1 TABLET: 24; 26 TABLET, FILM COATED ORAL at 21:00

## 2023-08-27 RX ADMIN — INSULIN LISPRO 10 UNITS: 100 INJECTION, SOLUTION INTRAVENOUS; SUBCUTANEOUS at 21:14

## 2023-08-27 RX ADMIN — SODIUM CHLORIDE, PRESERVATIVE FREE 10 ML: 5 INJECTION INTRAVENOUS at 21:02

## 2023-08-27 RX ADMIN — ACETAMINOPHEN 650 MG: 325 TABLET ORAL at 00:15

## 2023-08-27 RX ADMIN — INSULIN LISPRO 12 UNITS: 100 INJECTION, SOLUTION INTRAVENOUS; SUBCUTANEOUS at 12:42

## 2023-08-27 RX ADMIN — SODIUM CHLORIDE, PRESERVATIVE FREE 10 ML: 5 INJECTION INTRAVENOUS at 09:26

## 2023-08-27 RX ADMIN — POTASSIUM CHLORIDE 10 MEQ: 750 TABLET, FILM COATED, EXTENDED RELEASE ORAL at 09:06

## 2023-08-27 RX ADMIN — OXYCODONE HYDROCHLORIDE 10 MG: 10 TABLET ORAL at 19:28

## 2023-08-27 RX ADMIN — IPRATROPIUM BROMIDE AND ALBUTEROL SULFATE 1 DOSE: 2.5; .5 SOLUTION RESPIRATORY (INHALATION) at 08:41

## 2023-08-27 RX ADMIN — OXYCODONE HYDROCHLORIDE AND ACETAMINOPHEN 1000 MG: 500 TABLET ORAL at 09:05

## 2023-08-27 RX ADMIN — GABAPENTIN 400 MG: 400 CAPSULE ORAL at 09:06

## 2023-08-27 ASSESSMENT — PAIN DESCRIPTION - LOCATION
LOCATION: LEG

## 2023-08-27 ASSESSMENT — PAIN DESCRIPTION - ORIENTATION
ORIENTATION: RIGHT;LEFT
ORIENTATION: LEFT;RIGHT

## 2023-08-27 ASSESSMENT — ENCOUNTER SYMPTOMS
GASTROINTESTINAL NEGATIVE: 1
SHORTNESS OF BREATH: 1

## 2023-08-27 ASSESSMENT — PAIN - FUNCTIONAL ASSESSMENT
PAIN_FUNCTIONAL_ASSESSMENT: PREVENTS OR INTERFERES SOME ACTIVE ACTIVITIES AND ADLS

## 2023-08-27 ASSESSMENT — PAIN SCALES - GENERAL
PAINLEVEL_OUTOF10: 10
PAINLEVEL_OUTOF10: 9
PAINLEVEL_OUTOF10: 3
PAINLEVEL_OUTOF10: 7

## 2023-08-27 ASSESSMENT — PAIN DESCRIPTION - DESCRIPTORS
DESCRIPTORS: ACHING

## 2023-08-27 NOTE — PLAN OF CARE
Problem: Discharge Planning  Goal: Discharge to home or other facility with appropriate resources  Outcome: Progressing     Problem: Pain  Goal: Verbalizes/displays adequate comfort level or baseline comfort level  Outcome: Progressing     Problem: Skin/Tissue Integrity  Goal: Absence of new skin breakdown  Description: 1. Monitor for areas of redness and/or skin breakdown  2. Assess vascular access sites hourly  3. Every 4-6 hours minimum:  Change oxygen saturation probe site  4. Every 4-6 hours:  If on nasal continuous positive airway pressure, respiratory therapy assess nares and determine need for appliance change or resting period. Outcome: Progressing     Problem: Safety - Adult  Goal: Free from fall injury  Outcome: Progressing     Problem: ABCDS Injury Assessment  Goal: Absence of physical injury  Outcome: Progressing     Problem: Respiratory - Adult  Goal: Achieves optimal ventilation and oxygenation  Outcome: Progressing     Problem: Skin/Tissue Integrity - Adult  Goal: Skin integrity remains intact  Outcome: Progressing  Goal: Incisions, wounds, or drain sites healing without S/S of infection  Outcome: Progressing     Problem: Chronic Conditions and Co-morbidities  Goal: Patient's chronic conditions and co-morbidity symptoms are monitored and maintained or improved  Outcome: Progressing     Problem: Nutrition Deficit:  Goal: Optimize nutritional status  Outcome: Progressing       Patient alert and oriented x 4. No complaint of pain. Patient continue IV steroids and finger sticks. PO medications and meals tolerated. Patient external urinary device changed in the morning. Patient I and O's monitored.

## 2023-08-27 NOTE — PROGRESS NOTES
PULMONARY NOTE  [facility]    Name: Abel Orozco MRN: [id number]   : 1955 Hospital: [facility]   Date: 2023  Admission date: 2023 Hospital Day: 5       HPI:     Patient Active Problem List   Diagnosis    Onychomycosis    Non-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 ([address])    Idiopathic chronic venous hypertension of left lower extremity with ulcer ([address])    Ulcer of left foot, with fat layer exposed ([address])    Cellulitis    COVID-19    Venous ulcer ([address])    Hyperkeratosis    Localized edema    Type 2 diabetes mellitus with diabetic polyneuropathy, with long-term current use of insulin (HCC)    Cellulitis and abscess of right leg    Ulcer of right heel, with fat layer exposed ([address])    Calf ulcer, left, with fat layer exposed ([address])    Ankle ulcer, right, with fat layer exposed ([address])    Non-pressure chronic ulcer of other part of right lower leg with fat layer exposed ([address])    DEMARCO (acute kidney injury) ([address])    Back pain    Acute on chronic systolic CHF (congestive heart failure), NYHA class 2 ([address])    Shortness of breath    Acute on chronic congestive heart failure (HCC)    Wound infection    Venous stasis ulcer ([address])    Soft tissue infection    Sepsis ([address])    Venous stasis dermatitis of both lower extremities    Pain in both lower extremities    Infected stasis ulcer, left ([address])    Deep vein thrombosis (DVT) of proximal vein of both lower extremities ([address])             [x] High complexity decision making was performed  [x] See my orders for details      Subjective/Initial History:     I was asked by Chencho Hare MD to see Abel Orozco  a 75 y.o.    male in consultation     Excerpts from admission 2023 or consult notes as follows:   30-year-old male came in because of chest pain past medical history of chronic hypoxic and hypercapnic respiratory failure chronically on home oxygen and noninvasive ventilator trilogy, COPD

## 2023-08-27 NOTE — PROGRESS NOTES
Patient heart rate up [id number] nonsustained. Down to [address] on monitor. Patient in bed attempting to reposition.

## 2023-08-28 ENCOUNTER — APPOINTMENT (OUTPATIENT)
Facility: HOSPITAL | Age: 68
DRG: 603 | End: 2023-08-28
Payer: MEDICARE

## 2023-08-28 VITALS
OXYGEN SATURATION: 98 % | DIASTOLIC BLOOD PRESSURE: 70 MMHG | BODY MASS INDEX: 44.1 KG/M2 | HEIGHT: 71 IN | HEART RATE: 85 BPM | TEMPERATURE: 98.1 F | WEIGHT: 315 LBS | SYSTOLIC BLOOD PRESSURE: 146 MMHG | RESPIRATION RATE: 20 BRPM

## 2023-08-28 PROBLEM — M79.604 PAIN IN BOTH LOWER EXTREMITIES: Status: RESOLVED | Noted: 2023-08-24 | Resolved: 2023-08-28

## 2023-08-28 PROBLEM — I82.4Y3 DEEP VEIN THROMBOSIS (DVT) OF PROXIMAL VEIN OF BOTH LOWER EXTREMITIES (HCC): Status: RESOLVED | Noted: 2023-08-26 | Resolved: 2023-08-28

## 2023-08-28 PROBLEM — I87.2 VENOUS STASIS DERMATITIS OF BOTH LOWER EXTREMITIES: Status: RESOLVED | Noted: 2023-08-24 | Resolved: 2023-08-28

## 2023-08-28 PROBLEM — L08.9 SOFT TISSUE INFECTION: Status: RESOLVED | Noted: 2023-08-23 | Resolved: 2023-08-28

## 2023-08-28 PROBLEM — L97.929 INFECTED STASIS ULCER, LEFT (HCC): Status: RESOLVED | Noted: 2023-08-24 | Resolved: 2023-08-28

## 2023-08-28 PROBLEM — A41.9 SEPSIS (HCC): Status: RESOLVED | Noted: 2023-08-23 | Resolved: 2023-08-28

## 2023-08-28 PROBLEM — I83.229 INFECTED STASIS ULCER, LEFT (HCC): Status: RESOLVED | Noted: 2023-08-24 | Resolved: 2023-08-28

## 2023-08-28 PROBLEM — M79.605 PAIN IN BOTH LOWER EXTREMITIES: Status: RESOLVED | Noted: 2023-08-24 | Resolved: 2023-08-28

## 2023-08-28 LAB
GLUCOSE BLD STRIP.AUTO-MCNC: 307 MG/DL (ref 65–100)
GLUCOSE BLD STRIP.AUTO-MCNC: 323 MG/DL (ref 65–100)
GLUCOSE BLD STRIP.AUTO-MCNC: 333 MG/DL (ref 65–100)
GLUCOSE BLD STRIP.AUTO-MCNC: 379 MG/DL (ref 65–100)
GLUCOSE BLD STRIP.AUTO-MCNC: 405 MG/DL (ref 65–100)
PERFORMED BY:: ABNORMAL

## 2023-08-28 PROCEDURE — 82962 GLUCOSE BLOOD TEST: CPT

## 2023-08-28 PROCEDURE — 6370000000 HC RX 637 (ALT 250 FOR IP): Performed by: FAMILY MEDICINE

## 2023-08-28 PROCEDURE — 99232 SBSQ HOSP IP/OBS MODERATE 35: CPT | Performed by: INTERNAL MEDICINE

## 2023-08-28 PROCEDURE — 2580000003 HC RX 258: Performed by: FAMILY MEDICINE

## 2023-08-28 PROCEDURE — 2500000003 HC RX 250 WO HCPCS: Performed by: FAMILY MEDICINE

## 2023-08-28 PROCEDURE — 6360000002 HC RX W HCPCS: Performed by: FAMILY MEDICINE

## 2023-08-28 PROCEDURE — 97530 THERAPEUTIC ACTIVITIES: CPT

## 2023-08-28 PROCEDURE — 94640 AIRWAY INHALATION TREATMENT: CPT

## 2023-08-28 PROCEDURE — 97165 OT EVAL LOW COMPLEX 30 MIN: CPT

## 2023-08-28 PROCEDURE — 6370000000 HC RX 637 (ALT 250 FOR IP): Performed by: INTERNAL MEDICINE

## 2023-08-28 PROCEDURE — 2700000000 HC OXYGEN THERAPY PER DAY

## 2023-08-28 PROCEDURE — 94761 N-INVAS EAR/PLS OXIMETRY MLT: CPT

## 2023-08-28 PROCEDURE — 72131 CT LUMBAR SPINE W/O DYE: CPT

## 2023-08-28 RX ORDER — SORBITOL SOLUTION 70 %
30 SOLUTION, ORAL MISCELLANEOUS ONCE
Status: COMPLETED | OUTPATIENT
Start: 2023-08-28 | End: 2023-08-28

## 2023-08-28 RX ORDER — INSULIN GLARGINE 100 [IU]/ML
90 INJECTION, SOLUTION SUBCUTANEOUS EVERY MORNING
Status: DISCONTINUED | OUTPATIENT
Start: 2023-08-28 | End: 2023-08-28 | Stop reason: HOSPADM

## 2023-08-28 RX ORDER — L. ACIDOPHILUS/PECTIN, CITRUS 25MM-100MG
1 TABLET ORAL 2 TIMES DAILY
Status: DISCONTINUED | OUTPATIENT
Start: 2023-08-28 | End: 2023-08-28 | Stop reason: HOSPADM

## 2023-08-28 RX ORDER — PREDNISONE 20 MG/1
20 TABLET ORAL DAILY
Status: DISCONTINUED | OUTPATIENT
Start: 2023-08-28 | End: 2023-08-28 | Stop reason: HOSPADM

## 2023-08-28 RX ORDER — L. ACIDOPHILUS/PECTIN, CITRUS 25MM-100MG
1 TABLET ORAL 2 TIMES DAILY
Qty: 30 TABLET | Refills: 0 | Status: SHIPPED | OUTPATIENT
Start: 2023-08-28

## 2023-08-28 RX ORDER — INSULIN GLARGINE 100 [IU]/ML
34 INJECTION, SOLUTION SUBCUTANEOUS
Status: DISCONTINUED | OUTPATIENT
Start: 2023-08-28 | End: 2023-08-28 | Stop reason: HOSPADM

## 2023-08-28 RX ORDER — PREDNISONE 20 MG/1
20 TABLET ORAL DAILY
Qty: 10 TABLET | Refills: 0 | Status: SHIPPED | OUTPATIENT
Start: 2023-08-28 | End: 2023-09-07

## 2023-08-28 RX ORDER — SACCHAROMYCES BOULARDII 250 MG
250 CAPSULE ORAL 2 TIMES DAILY
Status: DISCONTINUED | OUTPATIENT
Start: 2023-08-28 | End: 2023-08-28

## 2023-08-28 RX ADMIN — Medication 1 TABLET: at 12:29

## 2023-08-28 RX ADMIN — INSULIN LISPRO 12 UNITS: 100 INJECTION, SOLUTION INTRAVENOUS; SUBCUTANEOUS at 16:50

## 2023-08-28 RX ADMIN — SODIUM CHLORIDE, PRESERVATIVE FREE 10 ML: 5 INJECTION INTRAVENOUS at 09:32

## 2023-08-28 RX ADMIN — IPRATROPIUM BROMIDE AND ALBUTEROL SULFATE 1 DOSE: 2.5; .5 SOLUTION RESPIRATORY (INHALATION) at 13:49

## 2023-08-28 RX ADMIN — POTASSIUM CHLORIDE 10 MEQ: 750 TABLET, FILM COATED, EXTENDED RELEASE ORAL at 14:06

## 2023-08-28 RX ADMIN — POTASSIUM CHLORIDE 10 MEQ: 750 TABLET, FILM COATED, EXTENDED RELEASE ORAL at 09:30

## 2023-08-28 RX ADMIN — OXYCODONE HYDROCHLORIDE AND ACETAMINOPHEN 1000 MG: 500 TABLET ORAL at 09:29

## 2023-08-28 RX ADMIN — MEROPENEM 1000 MG: 1 INJECTION, POWDER, FOR SOLUTION INTRAVENOUS at 04:18

## 2023-08-28 RX ADMIN — BUMETANIDE 2 MG: 1 TABLET ORAL at 09:30

## 2023-08-28 RX ADMIN — INSULIN LISPRO 16 UNITS: 100 INJECTION, SOLUTION INTRAVENOUS; SUBCUTANEOUS at 12:28

## 2023-08-28 RX ADMIN — INSULIN GLARGINE 34 UNITS: 100 INJECTION, SOLUTION SUBCUTANEOUS at 12:29

## 2023-08-28 RX ADMIN — OXYCODONE HYDROCHLORIDE 10 MG: 10 TABLET ORAL at 09:31

## 2023-08-28 RX ADMIN — GABAPENTIN 400 MG: 400 CAPSULE ORAL at 09:31

## 2023-08-28 RX ADMIN — ENOXAPARIN SODIUM 40 MG: 100 INJECTION SUBCUTANEOUS at 09:29

## 2023-08-28 RX ADMIN — Medication 1 TABLET: at 09:30

## 2023-08-28 RX ADMIN — CETIRIZINE HYDROCHLORIDE 10 MG: 10 TABLET, FILM COATED ORAL at 09:31

## 2023-08-28 RX ADMIN — COLCHICINE 0.6 MG: 0.6 TABLET ORAL at 09:31

## 2023-08-28 RX ADMIN — PREDNISONE 20 MG: 20 TABLET ORAL at 12:29

## 2023-08-28 RX ADMIN — OXYCODONE HYDROCHLORIDE 10 MG: 10 TABLET ORAL at 00:51

## 2023-08-28 RX ADMIN — ASPIRIN 81 MG 81 MG: 81 TABLET ORAL at 09:30

## 2023-08-28 RX ADMIN — OXYCODONE HYDROCHLORIDE 10 MG: 10 TABLET ORAL at 16:50

## 2023-08-28 RX ADMIN — FLUTICASONE PROPIONATE 2 SPRAY: 50 SPRAY, METERED NASAL at 09:35

## 2023-08-28 RX ADMIN — INSULIN LISPRO 12 UNITS: 100 INJECTION, SOLUTION INTRAVENOUS; SUBCUTANEOUS at 09:31

## 2023-08-28 RX ADMIN — SORBITOL SOLUTION (BULK) 30 ML: 70 SOLUTION at 12:30

## 2023-08-28 RX ADMIN — GABAPENTIN 400 MG: 400 CAPSULE ORAL at 14:06

## 2023-08-28 RX ADMIN — SACUBITRIL AND VALSARTAN 1 TABLET: 24; 26 TABLET, FILM COATED ORAL at 09:29

## 2023-08-28 RX ADMIN — POTASSIUM CHLORIDE 10 MEQ: 750 TABLET, FILM COATED, EXTENDED RELEASE ORAL at 16:50

## 2023-08-28 RX ADMIN — GABAPENTIN 400 MG: 400 CAPSULE ORAL at 16:50

## 2023-08-28 RX ADMIN — MEROPENEM 1000 MG: 1 INJECTION, POWDER, FOR SOLUTION INTRAVENOUS at 12:36

## 2023-08-28 ASSESSMENT — PAIN DESCRIPTION - DESCRIPTORS
DESCRIPTORS: THROBBING;SHARP
DESCRIPTORS: THROBBING;STABBING
DESCRIPTORS: ACHING

## 2023-08-28 ASSESSMENT — PAIN SCALES - GENERAL
PAINLEVEL_OUTOF10: 8
PAINLEVEL_OUTOF10: 9
PAINLEVEL_OUTOF10: 10

## 2023-08-28 ASSESSMENT — PAIN DESCRIPTION - LOCATION
LOCATION: LEG
LOCATION: LEG;BACK
LOCATION: BACK;LEG

## 2023-08-28 ASSESSMENT — ENCOUNTER SYMPTOMS
GASTROINTESTINAL NEGATIVE: 1
SHORTNESS OF BREATH: 1

## 2023-08-28 ASSESSMENT — PAIN DESCRIPTION - ORIENTATION
ORIENTATION: RIGHT;LEFT;LOWER
ORIENTATION: RIGHT;LEFT
ORIENTATION: LEFT;RIGHT

## 2023-08-28 NOTE — PROGRESS NOTES
No results found for: TSH    Results       Procedure Component Value Units Date/Time    Blood Culture 1 [[id number]] Collected: 08/23/23 1547    Order Status: Completed Specimen: Blood Updated: 08/28/23 0919     Special Requests Left        Culture No growth 5 days       Blood Culture 2 [[id number]] Collected: 08/23/23 1547    Order Status: Completed Specimen: Blood Updated: 08/28/23 0919     Special Requests No Special Requests        Culture No growth 5 days                Imaging:  XR TIBIA FIBULA LEFT (2 VIEWS)    Result Date: 8/23/2023  EXAM:  XR TIBIA FIBULA LEFT (2 VIEWS) INDICATION:  Leg pain COMPARISON: None. FINDINGS: Two views of the left tibia and fibula demonstrate coarse superficial skin calcifications but otherwise no fracture or other osseous, articular or soft tissue abnormality. Incidental note of osteoarthritic changes of the visualized ankle and plantar posterior calcaneal spurs. No acute findings. Coarse superficial skin calcifications of uncertain significance/etiology. XR ANKLE LEFT (2 VIEWS)    Result Date: 8/23/2023  EXAM: XR ANKLE LEFT (2 VIEWS) INDICATION: Wound. Site not specified COMPARISON: None. FINDINGS: Two views of the left ankle demonstrate no fracture or disruption of the ankle mortise. There is no other acute osseous or articular abnormality. Soft tissues are prominent diffusely with heavy vascular and dystrophic calcification. Degenerative change incidentally noted. . A soft tissue wound or lesion is not identified. No acute abnormality. XR CHEST PORTABLE    Result Date: 8/23/2023  EXAM:  XR CHEST PORTABLE INDICATION: Pain. COMPARISON: CT 6/14/2023 and chest x-ray 6/14/2023. TECHNIQUE: Semiupright portable chest AP view FINDINGS: Cardiac monitoring leads are noted. Pacemaker/ICD generator body projects over the left chest wall with intact appearing leads traversing in expected course. The cardiac silhouette is within normal limits.  Atherosclerotic calcifications affect the aortic arch. There is hazy opacity at the left base with obscuration of the left cardiophrenic angle. Pulmonary vascularity is prominent with mild interstitial prominence. No consolidative infiltrates otherwise and no pneumothorax or right pleural effusion. The visualized bones and upper abdomen are age-appropriate. Possible left pleural effusion and overlying atelectasis versus infiltrate. Pulmonary vascular congestion with mild interstitial edema. Pacemaker. ARTERIAL BLOOD GAS      IMPRESSION:   Acute on chronic respiratory failure with Hypoxia on home oxygen 3 L  Chronic hypercapnic respiratory failure on home trilogy noninvasive ventilator  Chronic Obstructive Pulmonary Disease   Chronic leg edema with left leg wound with Pseudomonas   Chronic pain      RECOMMENDATIONS/PLAN:     Continue noninvasive ventilator trilogy at night and while sleeping daytime to prevent worsening respiratory acidosis  Continue normal 3 L daytime nasal Cannula oxygen as salvage oxygen delivery device to provide high concentration of oxygen to overcome refractory hypoxia;   He is chronically on home oxygen during the daytime and noninvasive ventilator trilogy at night  Wound clinic for leg wound on antibiotics  On Symbicort Flonase and nebulizer treatment  Chest x-ray shows small left effusion atelectasis mild congestion and edema           Roger Li MD

## 2023-08-28 NOTE — PROGRESS NOTES
Patient is supposed to go on CT but patient said he'd prefer it to be done in the morning since he's already tired but if needed to, he'll just call once he's ready to go down.  I called CT to inform them about patient's request.

## 2023-08-28 NOTE — PLAN OF CARE
Problem: Physical Therapy - Adult  Goal: By Discharge: Performs mobility at highest level of function for planned discharge setting. See evaluation for individualized goals. Description: FUNCTIONAL STATUS PRIOR TO ADMISSION: pt lives with spouse, req A with ADLs, ambulates with rollator        Physical Therapy Goals  Initiated 8/26/2023  Pt stated goal: to be able to walk again    I with LE HEP x7 days  Roll L and R in bed with min Ax1 x7 days  Supine to sit EOB with min Ax1 x7 days  Sit to stand with mod Ax1-2 x 7 days  (may benefit from using carlton plus to ease LE pain during attempt at standing if pain does not subside over next couple of sessions)    Future goals TBD as pt progresses with PT    Outcome: Progressing            PHYSICAL THERAPY TREATMENT     Patient: Raúl Zambrano (12 y.o. male)  Date: 8/28/2023  Diagnosis: Dehydration [E86.0]  Acute chest pain [R07.9]  Soft tissue infection [L08.9]  BMI 50.0-59.9, adult ([address]) [Z68.43]  Sepsis ([address]) [A41.9] Soft tissue infection      Precautions:  Fall Risk, Bed Alarm                In place during session: External Catheter and EKG/telemetry   Chart, physical therapy assessment, plan of care and goals were reviewed. ASSESSMENT  Patient continues with skilled PT services and is progressing towards goals. Pt in supine with CNA present upon PT arrival, agreeable to session. Pt A&O x 4. (See below for objective details and assist levels). Overall pt tolerated session fair today with bed mobility and transfers. Pt currently experiencing pain in BLE; did not wish to sit up in chair but req to practice sit <>transfer using bed side commode. Pt able to perform bed mobility with Modified independence but req some Min A x1 to sit up after rolling. Pt demo's good sitting balance with no support and is able to scoot to place feet on the floor independently. Pt ModX1 for sit<>transfer today and expresses that his transfer felt much better than his last session.

## 2023-08-28 NOTE — CARE COORDINATION
Patient is clear to d/c from  to [facility], room 110, nurse report can be called to [phone]. CM notified patient, patient agreeable, IMM delivered. Patient will need transport on d/c, nurse notified.

## 2023-08-28 NOTE — PROGRESS NOTES
I have reviewed discharge instructions with patient. Patient verbalized understanding. Patient will be discharged from unit via [facility]. Report called into Darren Pratt LPN at [address] [facility]. It was advised patient will be discharged from unit with PICC for IV abx.

## 2023-08-28 NOTE — DISCHARGE SUMMARY
Discharge Summary       PATIENT ID: Beth Castano  MRN: [id number]   YOB: 1955    DATE OF ADMISSION: 8/23/2023   DATE OF DISCHARGE:   PRIMARY CARE PROVIDER: [unfilled]      ATTENDING PHYSICIAN: Robert Hinson  DISCHARGING PROVIDER: Tiffany Pino      CONSULTATIONS: IP CONSULT TO INFECTIOUS DISEASES  IP CONSULT TO INFECTIOUS DISEASES  IP WOUND CARE NURSE CONSULT TO EVAL  IP CONSULT TO PULMONOLOGY  IP CONSULT TO DIETITIAN  IP CONSULT TO PICC TEAM    PROCEDURES/SURGERIES: * No surgery found *    ADMITTING DIAGNOSES:    Patient Active Problem List    Diagnosis Date Noted    Acute on chronic congestive heart failure ([address]) 06/15/2023    Wound infection 06/15/2023    Venous stasis ulcer ([address]) 06/15/2023    Acute on chronic systolic CHF (congestive heart failure), NYHA class 2 ([address]) 06/14/2023    Shortness of breath 06/14/2023    Back pain 07/25/2022    DEMARCO (acute kidney injury) ([address]) 07/11/2022    Non-pressure chronic ulcer of other part of right lower leg with fat layer exposed ([address]) 06/15/2022    Calf ulcer, left, with fat layer exposed ([address]) 04/06/2022    Ankle ulcer, right, with fat layer exposed ([address]) 04/06/2022    Ulcer of right heel, with fat layer exposed ([address]) 03/23/2022    Ulcer of left foot, with fat layer exposed ([address]) 03/09/2022    Non-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 ([address]) 03/02/2022    Venous ulcer ([address]) 06/17/2021    Cellulitis and abscess of right leg 06/02/2021    Cellulitis 06/01/2021    COVID-19 01/04/2021    Onychomycosis 11/23/2020    Hyperkeratosis 11/23/2020    Type 2 diabetes mellitus with diabetic polyneuropathy, with long-term current use of insulin ([address]) 11/23/2020    Localized edema 09/21/2020    Idiopathic chronic venous hypertension of left lower extremity with ulcer ([address]) 09/14/2020       DISCHARGE DIAGNOSES / PLAN:      Cellulitis //Pseudomonas  Chronic venous ulceration left lower extremity  COPD  Type 2 diabetes recommendation of wound care for a wound check. He has a wound on the left lower extremity that is very painful and was instructed to come to the ED over infection concerns. He denies fever, chills, nausea, vomiting. He does report significant pain in the left lower extremity from the foot to the knee. He also complained of non-radiating left sided chest pain at ED presentation that has since resolved. He also noted shortness of breath at ED presentation that was noted to be his baseline. Lactic acid was elevated at 3.07 in ED, has since decreased to 0.94. Per infectious disease provider seen on 8/22, P.aeruginosa has been repeatedly isolated from wound Cxs most recently 08/09, reported as resistant to quinolones. 8/23 XR ankle left  No acute abnormality. 8/23 XR tibia fibula left  No acute findings. Coarse superficial skin calcifications of uncertain significance/etiology. 8/23 XR chest portable  Possible left pleural effusion and overlying atelectasis versus infiltrate. Pulmonary vascular congestion with mild interstitial edema. Pacemaker. 8/23 Blood cultures no growth 6 hours        8/25  Patient is awake, alert, not in acute distress  Patient reports improvement in pain in the lower extremities, patient given one dose of morphine 3mg yesterday, Gabapentin increased from 300 mg QID to 400 mg QID, Oxycodone increased to 10 mg every 6 hours as needed  Denies fever, chills, nausea, vomiting  Patient reports shortness of breath is at his baseline  Per ID, Continue on meropenem, anticipating 3 wks of therapy      Blood cultures no growth 1 day  NT pro-     8/24 KIMBERLY  Impression:  1. Right ankle-brachial index is 1.16, normal  2. Left ankle-brachial index is 1.05, normal.     8/24 Vascular duplex bilateral lower extremity  Impression:  1. Bilateral leg deep vein system do not show acute venous thrombosis.   2.  Bilateral leg deep vein system shows spontaneous, phasic, competent venous flow with

## 2023-08-28 NOTE — PROGRESS NOTES
Called Dr. Abril Hill to inform about patient's glucose of 450 mg/dL. Orders obtained for 10 units of Humalog and 30 units Lantus. Will continue to monitor patient.

## 2023-08-29 LAB
BACTERIA SPEC CULT: NORMAL
BACTERIA SPEC CULT: NORMAL
Lab: NORMAL
Lab: NORMAL

## 2023-08-29 NOTE — PROGRESS NOTES
Physician Progress Note      PATIENT:               Jose Servin  CSN #:                  [id number]  :                       1955  ADMIT DATE:       2023 3:19 PM  [address] [facility] DATE:        2023 5:48 PM  RESPONDING  PROVIDER #:        Juno Canseco MD          QUERY TEXT:    Pt admitted with left leg cellulitis. Pt noted to have DM. If possible, please   document in progress notes and discharge summary the relationship, if any,   between cellulitis and DM. The medical record reflects the following:  Risk Factors: 76 yo male with DM, COPD, HTN  Clinical Indicators: Venous stasis dermatitis of both lower   extremities-swelling and erythema noted to bilateral lower legs  Treatment: Lantus, Gabapentin, IV Meropenem    Thank you,  Gemma Turner RN, CCDS  Options provided:  -- Left leg cellulitis associated with Diabetes  -- Left leg cellulitis unrelated to Diabetes  -- Other - I will add my own diagnosis  -- Disagree - Not applicable / Not valid  -- Disagree - Clinically unable to determine / Unknown  -- Refer to Clinical Documentation Reviewer    PROVIDER RESPONSE TEXT:    Left leg cellulitis unrelated to Diabetes. Query created by: Olya Gallegos on 2023 2:09 PM      QUERY TEXT:    Pt admitted with cellulitis. Pt noted to have elevated lactic acid levels. If   possible, please document in the progress notes and discharge summary if you   are evaluating and/or treating any of the following:       The medical record reflects the following:  Risk Factors: 76 yo male with DM, cellulitis, venous stasis ulcers  Clinical Indicators: Lactic Acid of 3.0  Treatment: IV Normal Saline, IV Meropenem    Thank you,  Gemma Turner RN, CCDS  Options provided:  -- Metabolic Acidosis  -- Lactic Acidosis  -- Other - I will add my own diagnosis  -- Disagree - Not applicable / Not valid  -- Disagree - Clinically unable to determine / Unknown  -- Refer to Clinical Documentation Reviewer    PROVIDER RESPONSE TEXT:    This patient has lactic acidosis.     Query created by: Larry Wilson on 8/24/2023 2:12 PM      Electronically signed by:  Tamar Peabody MD 8/29/2023 8:42 AM

## 2023-09-06 ENCOUNTER — TELEPHONE (OUTPATIENT)
Age: 68
End: 2023-09-06

## 2023-09-26 ENCOUNTER — OFFICE VISIT (OUTPATIENT)
Age: 68
End: 2023-09-26
Payer: MEDICARE

## 2023-09-26 VITALS
HEIGHT: 60 IN | DIASTOLIC BLOOD PRESSURE: 77 MMHG | SYSTOLIC BLOOD PRESSURE: 115 MMHG | BODY MASS INDEX: 61.84 KG/M2 | TEMPERATURE: 98.6 F | WEIGHT: 315 LBS | HEART RATE: 98 BPM | OXYGEN SATURATION: 97 % | RESPIRATION RATE: 20 BRPM

## 2023-09-26 DIAGNOSIS — M79.605 BILATERAL LEG PAIN: ICD-10-CM

## 2023-09-26 DIAGNOSIS — I87.8 VENOUS STASIS OF BOTH LOWER EXTREMITIES: ICD-10-CM

## 2023-09-26 DIAGNOSIS — J42 CHRONIC BRONCHITIS, UNSPECIFIED CHRONIC BRONCHITIS TYPE (HCC): ICD-10-CM

## 2023-09-26 DIAGNOSIS — I83.029 STASIS ULCER OF LOWER EXTREMITY, LEFT (HCC): Primary | ICD-10-CM

## 2023-09-26 DIAGNOSIS — L97.929 STASIS ULCER OF LOWER EXTREMITY, LEFT (HCC): Primary | ICD-10-CM

## 2023-09-26 DIAGNOSIS — M79.604 BILATERAL LEG PAIN: ICD-10-CM

## 2023-09-26 PROCEDURE — G8417 CALC BMI ABV UP PARAM F/U: HCPCS | Performed by: INTERNAL MEDICINE

## 2023-09-26 PROCEDURE — 1036F TOBACCO NON-USER: CPT | Performed by: INTERNAL MEDICINE

## 2023-09-26 PROCEDURE — G8427 DOCREV CUR MEDS BY ELIG CLIN: HCPCS | Performed by: INTERNAL MEDICINE

## 2023-09-26 PROCEDURE — 1111F DSCHRG MED/CURRENT MED MERGE: CPT | Performed by: INTERNAL MEDICINE

## 2023-09-26 PROCEDURE — 3017F COLORECTAL CA SCREEN DOC REV: CPT | Performed by: INTERNAL MEDICINE

## 2023-09-26 PROCEDURE — 3023F SPIROM DOC REV: CPT | Performed by: INTERNAL MEDICINE

## 2023-09-26 PROCEDURE — 1123F ACP DISCUSS/DSCN MKR DOCD: CPT | Performed by: INTERNAL MEDICINE

## 2023-09-26 PROCEDURE — 99214 OFFICE O/P EST MOD 30 MIN: CPT | Performed by: INTERNAL MEDICINE

## 2023-09-26 ASSESSMENT — ENCOUNTER SYMPTOMS
GASTROINTESTINAL NEGATIVE: 1
SHORTNESS OF BREATH: 1

## 2023-10-01 LAB
BACTERIA SPEC AEROBE CULT: NORMAL
BACTERIA SPEC ANAEROBE CULT: NORMAL

## 2023-10-11 ENCOUNTER — HOSPITAL ENCOUNTER (OUTPATIENT)
Facility: HOSPITAL | Age: 68
Discharge: HOME | End: 2023-10-11
Attending: SPECIALIST
Payer: MEDICARE

## 2023-10-11 VITALS
DIASTOLIC BLOOD PRESSURE: 69 MMHG | RESPIRATION RATE: 20 BRPM | HEART RATE: 95 BPM | SYSTOLIC BLOOD PRESSURE: 117 MMHG | TEMPERATURE: 98.4 F

## 2023-10-11 DIAGNOSIS — S91.301S OPEN WOUND OF RIGHT FOOT, SEQUELA: ICD-10-CM

## 2023-10-11 DIAGNOSIS — L97.822 NON-PRESSURE CHRONIC ULCER OF OTHER PART OF LEFT LOWER LEG WITH FAT LAYER EXPOSED (HCC): Primary | ICD-10-CM

## 2023-10-11 PROBLEM — L97.522 ULCER OF LEFT FOOT, WITH FAT LAYER EXPOSED (HCC): Status: ACTIVE | Noted: 2022-03-09

## 2023-10-11 PROBLEM — S91.301A OPEN WOUND OF RIGHT FOOT: Status: ACTIVE | Noted: 2023-10-11

## 2023-10-11 PROCEDURE — 87205 SMEAR GRAM STAIN: CPT

## 2023-10-11 PROCEDURE — 11045 DBRDMT SUBQ TISS EACH ADDL: CPT

## 2023-10-11 PROCEDURE — 87070 CULTURE OTHR SPECIMN AEROBIC: CPT

## 2023-10-11 PROCEDURE — 87077 CULTURE AEROBIC IDENTIFY: CPT

## 2023-10-11 PROCEDURE — 87186 SC STD MICRODIL/AGAR DIL: CPT

## 2023-10-11 PROCEDURE — 11042 DBRDMT SUBQ TIS 1ST 20SQCM/<: CPT

## 2023-10-11 RX ORDER — TRIAMCINOLONE ACETONIDE 1 MG/G
OINTMENT TOPICAL ONCE
OUTPATIENT
Start: 2023-10-11 | End: 2023-10-11

## 2023-10-11 RX ORDER — LIDOCAINE HYDROCHLORIDE 20 MG/ML
JELLY TOPICAL ONCE
OUTPATIENT
Start: 2023-10-11 | End: 2023-10-11

## 2023-10-11 RX ORDER — OXYCODONE HCL 10 MG/1
10 TABLET, FILM COATED, EXTENDED RELEASE ORAL 4 TIMES DAILY
COMMUNITY

## 2023-10-11 RX ORDER — SODIUM CHLOR/HYPOCHLOROUS ACID 0.033 %
SOLUTION, IRRIGATION IRRIGATION ONCE
OUTPATIENT
Start: 2023-10-11 | End: 2023-10-11

## 2023-10-11 ASSESSMENT — PAIN SCALES - GENERAL: PAINLEVEL_OUTOF10: 7

## 2023-10-11 NOTE — WOUND CARE
leg(s) above the level of the heart when sitting. [x] Avoid prolonged standing in one place. Off-Loading:   [] Off-loading when: [] walking  [] in bed [] sitting  [] Total non-weight bearing  [] Right Leg  [] Left Leg   [] Assistive Device [] Walker [] Cane  [] Wheelchair  [] Crutches   [] Surgical shoe    [] Wedge Shoe  [] Foam Boot(s)  [] Knee Scooter    [] Cast Boot [] CROW Boot     [] Diabetic Shoes    [] Offloading heel boot  [] Other:     Dietary:  [] Diet as tolerated: [x] Calorie Diabetic Diet: [x] No Added Salt:  [] Increase Protein: [] Other:     Activity:  [x] Activity as tolerated:    [] Patient has no activity restrictions      [] Strict Bedrest   [] Remain off Work      [] May return to full duty work                                     [] Return to work with restrictions     Physician:  [x] Dr. Byron Newton  [] Dr. Karyle Grana  [] Dr. Olga Daniels      Nurse Case Manger:  [facility] Information: Should you experience any significant changes in your wound(s) or have questions about your wound care, please contact the [facility] at [phone]. Our hours are Monday - Friday 8am - 4:30pm, closed all major holidays. If you need help with your wound outside these hours and cannot wait until we are again available, contact your PCP or go to the hospital emergency room. PLEASE NOTE: IF YOU ARE UNABLE TO OBTAIN WOUND SUPPLIES, CONTINUE TO USE THE SUPPLIES YOU HAVE AVAILABLE UNTIL YOU ARE ABLE TO REACH US. IT IS MOST IMPORTANT TO KEEP THE WOUND COVERED AT ALL TIMES.
Number of days: 0        Total Surface Area Debrided:  33.3 sq cm   Estimated Blood Loss: Minimal amount blood loss . Hemostasis Achieved:  by pressure  Procedural Pain: 0  / 10   Post Procedural Pain: 0 / 10   Response to treatment: Patient tolerated procedure well with no complaints of pain.

## 2023-10-11 NOTE — DISCHARGE INSTRUCTIONS
leg(s) above the level of the heart when sitting. [x] Avoid prolonged standing in one place. Off-Loading:   [] Off-loading when: [] walking  [] in bed [] sitting  [] Total non-weight bearing  [] Right Leg  [] Left Leg   [] Assistive Device [] Walker [] Cane  [] Wheelchair  [] Crutches   [] Surgical shoe    [] Wedge Shoe  [] Foam Boot(s)  [] Knee Scooter    [] Cast Boot [] CROW Boot     [] Diabetic Shoes    [] Offloading heel boot  [] Other:     Dietary:  [] Diet as tolerated: [x] Calorie Diabetic Diet: [x] No Added Salt:  [] Increase Protein: [] Other:     Activity:  [x] Activity as tolerated:    [] Patient has no activity restrictions      [] Strict Bedrest   [] Remain off Work      [] May return to full duty work                                     [] Return to work with restrictions     Physician:  [x] Dr. Julia Kessler  [] Dr. Kiara Bravo  [] Dr. Alpesh Harrison      Nurse Case Manger:  Gregory Etsrada Information: Should you experience any significant changes in your wound(s) or have questions about your wound care, please contact the [facility] at [phone]. Our hours are Monday - Friday 8am - 4:30pm, closed all major holidays. If you need help with your wound outside these hours and cannot wait until we are again available, contact your PCP or go to the hospital emergency room. PLEASE NOTE: IF YOU ARE UNABLE TO OBTAIN WOUND SUPPLIES, CONTINUE TO USE THE SUPPLIES YOU HAVE AVAILABLE UNTIL YOU ARE ABLE TO REACH US. IT IS MOST IMPORTANT TO KEEP THE WOUND COVERED AT ALL TIMES.

## 2023-10-14 LAB
BACTERIA SPEC CULT: ABNORMAL
BACTERIA SPEC CULT: ABNORMAL
GRAM STN SPEC: ABNORMAL
Lab: ABNORMAL

## 2023-10-17 ENCOUNTER — OFFICE VISIT (OUTPATIENT)
Age: 68
End: 2023-10-17
Payer: MEDICARE

## 2023-10-17 VITALS
RESPIRATION RATE: 20 BRPM | SYSTOLIC BLOOD PRESSURE: 130 MMHG | HEIGHT: 60 IN | BODY MASS INDEX: 61.84 KG/M2 | WEIGHT: 315 LBS | HEART RATE: 89 BPM | TEMPERATURE: 98.5 F | OXYGEN SATURATION: 98 % | DIASTOLIC BLOOD PRESSURE: 75 MMHG

## 2023-10-17 DIAGNOSIS — I83.022 VENOUS STASIS ULCER OF LEFT CALF LIMITED TO BREAKDOWN OF SKIN, UNSPECIFIED WHETHER VARICOSE VEINS PRESENT (HCC): ICD-10-CM

## 2023-10-17 DIAGNOSIS — Z22.322 MRSA (METHICILLIN RESISTANT STAPH AUREUS) CULTURE POSITIVE: ICD-10-CM

## 2023-10-17 DIAGNOSIS — J44.9 CHRONIC OBSTRUCTIVE PULMONARY DISEASE, UNSPECIFIED COPD TYPE (HCC): ICD-10-CM

## 2023-10-17 DIAGNOSIS — M1A.9XX1 CHRONIC TOPHACEOUS GOUT OF RIGHT FOOT: Primary | ICD-10-CM

## 2023-10-17 DIAGNOSIS — L97.221 VENOUS STASIS ULCER OF LEFT CALF LIMITED TO BREAKDOWN OF SKIN, UNSPECIFIED WHETHER VARICOSE VEINS PRESENT (HCC): ICD-10-CM

## 2023-10-17 PROCEDURE — G8484 FLU IMMUNIZE NO ADMIN: HCPCS | Performed by: INTERNAL MEDICINE

## 2023-10-17 PROCEDURE — 1036F TOBACCO NON-USER: CPT | Performed by: INTERNAL MEDICINE

## 2023-10-17 PROCEDURE — 3023F SPIROM DOC REV: CPT | Performed by: INTERNAL MEDICINE

## 2023-10-17 PROCEDURE — 99214 OFFICE O/P EST MOD 30 MIN: CPT | Performed by: INTERNAL MEDICINE

## 2023-10-17 PROCEDURE — 1123F ACP DISCUSS/DSCN MKR DOCD: CPT | Performed by: INTERNAL MEDICINE

## 2023-10-17 PROCEDURE — 3017F COLORECTAL CA SCREEN DOC REV: CPT | Performed by: INTERNAL MEDICINE

## 2023-10-17 PROCEDURE — G8427 DOCREV CUR MEDS BY ELIG CLIN: HCPCS | Performed by: INTERNAL MEDICINE

## 2023-10-17 PROCEDURE — G8417 CALC BMI ABV UP PARAM F/U: HCPCS | Performed by: INTERNAL MEDICINE

## 2023-10-17 RX ORDER — GABAPENTIN 300 MG/1
CAPSULE ORAL
COMMUNITY
Start: 2023-08-04 | End: 2023-10-17 | Stop reason: ALTCHOICE

## 2023-10-17 RX ORDER — ALLOPURINOL 100 MG/1
200 TABLET ORAL DAILY
Qty: 60 TABLET | Refills: 1 | Status: SHIPPED | OUTPATIENT
Start: 2023-10-17 | End: 2023-11-16

## 2023-10-17 RX ORDER — PREDNISONE 20 MG/1
20 TABLET ORAL 2 TIMES DAILY
COMMUNITY
Start: 2023-10-06

## 2023-10-17 RX ORDER — INSULIN LISPRO 100 [IU]/ML
INJECTION, SOLUTION INTRAVENOUS; SUBCUTANEOUS
COMMUNITY
Start: 2023-10-05

## 2023-10-17 RX ORDER — AMMONIUM LACTATE 12 G/100G
LOTION TOPICAL
COMMUNITY
Start: 2023-10-06

## 2023-10-17 RX ORDER — BETAMETHASONE DIPROPIONATE 0.5 MG/G
CREAM TOPICAL
COMMUNITY
Start: 2023-10-04

## 2023-10-17 ASSESSMENT — ENCOUNTER SYMPTOMS
RESPIRATORY NEGATIVE: 1
GASTROINTESTINAL NEGATIVE: 1

## 2023-10-17 NOTE — PROGRESS NOTES
HPI: Pt presents for a routine f/u of left leg venous stasis ulcer. He reports right toe swelling and drainage from plantar surface, Cx of which grew MRSA (10/11/23). He is s/p 3 wks of Meropenem targeting MDR P.aeruginosa isolated from left leg ulcer on 08/09/23. Repeat Cx of his left leg during his last encounter on 09/26/23 was neg. He reports b/l great toe swelling R>L, and drainage from right great toe. He denies toe pain or discomfort but has a h/o neuropathy. He is on a 2 wk course of Doxycycline for MRSA coverage. Pt denies acute complaints on assessment today. He has a h/o DM,CAD, HTN and gouty arthritis for which he is on long term Colchicine, never been on Allopurinol    ROS  Review of Systems   Constitutional: Negative. Respiratory: Negative. Cardiovascular: Negative. Gastrointestinal: Negative. Musculoskeletal:         Right  great toe swelling on plantar surface      Past Medical History:   Diagnosis Date    Arthritis     CAD (coronary artery disease)     Chronic obstructive pulmonary disease (HCC)     Diabetes ([address])     Gout     Hypertension     Ill-defined condition     Sleep apnea         Past Surgical History:   Procedure Laterality Date    HX VEIN ABLATION ADHESIVE      ORTHOPEDIC SURGERY      PACEMAKER          Social History     Tobacco Use    Smoking status: Never    Smokeless tobacco: Never   Substance Use Topics    Alcohol use: Not Currently    Drug use: Never        Family History   Problem Relation Age of Onset    Diabetes Mother     Heart Disease Father     Hypertension Father     Diabetes Sister     Diabetes Brother     Hypertension Mother     Heart Disease Mother     Kidney Disease Mother         Allergies   Allergen Reactions    Amitriptyline Angioedema    Naproxen      Other reaction(s): Unknown (comments)  Pt not sure of reaction    Sulfa Antibiotics Nausea And Vomiting        Objective  Physical Exam  Constitutional:       Appearance: Normal appearance.

## 2023-10-18 ENCOUNTER — HOSPITAL ENCOUNTER (OUTPATIENT)
Facility: HOSPITAL | Age: 68
Discharge: HOME | End: 2023-10-18
Attending: SPECIALIST
Payer: MEDICARE

## 2023-10-18 VITALS
TEMPERATURE: 98.1 F | HEART RATE: 112 BPM | DIASTOLIC BLOOD PRESSURE: 69 MMHG | SYSTOLIC BLOOD PRESSURE: 136 MMHG | RESPIRATION RATE: 20 BRPM

## 2023-10-18 DIAGNOSIS — L97.822 NON-PRESSURE CHRONIC ULCER OF OTHER PART OF LEFT LOWER LEG WITH FAT LAYER EXPOSED (HCC): ICD-10-CM

## 2023-10-18 DIAGNOSIS — S91.301D OPEN WOUND OF RIGHT FOOT, SUBSEQUENT ENCOUNTER: Primary | ICD-10-CM

## 2023-10-18 PROCEDURE — 11045 DBRDMT SUBQ TISS EACH ADDL: CPT

## 2023-10-18 PROCEDURE — 11042 DBRDMT SUBQ TIS 1ST 20SQCM/<: CPT

## 2023-10-18 RX ORDER — TRIAMCINOLONE ACETONIDE 1 MG/G
OINTMENT TOPICAL ONCE
OUTPATIENT
Start: 2023-10-18 | End: 2023-10-18

## 2023-10-18 RX ORDER — SODIUM CHLOR/HYPOCHLOROUS ACID 0.033 %
SOLUTION, IRRIGATION IRRIGATION ONCE
OUTPATIENT
Start: 2023-10-18 | End: 2023-10-18

## 2023-10-18 RX ORDER — LIDOCAINE HYDROCHLORIDE 20 MG/ML
JELLY TOPICAL ONCE
OUTPATIENT
Start: 2023-10-18 | End: 2023-10-18

## 2023-10-18 ASSESSMENT — PAIN DESCRIPTION - ORIENTATION: ORIENTATION: LEFT

## 2023-10-18 ASSESSMENT — PAIN DESCRIPTION - LOCATION: LOCATION: LEG

## 2023-10-18 ASSESSMENT — PAIN DESCRIPTION - DESCRIPTORS: DESCRIPTORS: ACHING

## 2023-10-18 ASSESSMENT — PAIN SCALES - GENERAL: PAINLEVEL_OUTOF10: 7

## 2023-10-18 NOTE — DISCHARGE INSTRUCTIONS
Wound Clinic Physician Orders and Discharge Instructions  [address] [place] [address] S. 709 [facility], [address] [place] [address] Way  Telephone: [phone]    NAME:  Elvis Davis OF BIRTH:  1955  MEDICAL RECORD NUMBER:  [id number]  DATE:  10/18/2023      Return Appointment:  [] Dressing Supply Provider:   [x] Home Healthcare: Marley Workman  [x] Return Appointment: 1 Week(s)  [] Nurse Visit:     [] Discharge from [facility] [] Healed        [] Refer to Provider:         [] Consult    Follow-up Information:  [] Ordered Tests:  [] Rx:   [x] Other: refer to [address] [place] [address] for right foot gout    Wound Cleansing:   Do not scrub or use excessive force. Cleanse wound prior to applying a clean dressing with:  [x] Normal Saline OR   [] Keep Wound Dry in Shower     [] Wound Cleanser   [x] Cleanse wound with Mild Soap & Water    [] Other:       Topical Treatments:  Do not apply lotions, creams, or ointments to wound bed unless directed. [] Apply moisturizing lotion to skin surrounding the wound prior to dressing change.  [] Apply antifungal ointment to skin surrounding the wound prior to dressing change.  [] Apply thin film of moisture barrier ointment to skin immediately around wound.   [] Apply Betadine to skin immediately around wound   [] Other:      Dressings:           Wound Location   Left Leg/foot   [x] Apply Primary Dressing:       [] MediHoney Gel [] MediHoney Alginate  [x] Calcium Alginate with Silver - SILVERCEL 2ND [] Calcium Alginate without silver   [] Collagen with silver [] Collagen without Silver    [] Santyl with moist saline gauze     [] Hydrofera Blue (cut to size and moistened with normal saline)  [] Hydrofera Blue Ready (cut to size)      [] Normal Saline wet to dry  [] Betadine wet to dry    [] Hydrogel  [] Mepitel     [] Bactroban/Mupirocin   [] Iodoform Packing Strip [] Plain Packing Strip   [] Skin Sub:   [x] Other: ADAPTIC 1ST,   [x] Cover and Secure

## 2023-10-18 NOTE — WOUND CARE
Wound Clinic Physician Orders and Discharge Instructions  2 [address] [place] [address] S. 709 [facility], [address] [place] [address] Way  Telephone: [phone]    NAME:  Alvarado Storey OF BIRTH:  1955  MEDICAL RECORD NUMBER:  [id number]  DATE:  10/18/2023      Return Appointment:  [] Dressing Supply Provider:   [x] Home Healthcare: Carmen Galan  [x] Return Appointment: 1 Week(s)  [] Nurse Visit:     [] Discharge from [facility]: [] Healed        [] Refer to Provider:         [] Consult    Follow-up Information:  [] Ordered Tests:  [] Rx:   [x] Other: refer to [address] [place] [address] for right foot gout    Wound Cleansing:   Do not scrub or use excessive force. Cleanse wound prior to applying a clean dressing with:  [x] Normal Saline OR   [] Keep Wound Dry in Shower     [] Wound Cleanser   [x] Cleanse wound with Mild Soap & Water    [] Other:       Topical Treatments:  Do not apply lotions, creams, or ointments to wound bed unless directed. [] Apply moisturizing lotion to skin surrounding the wound prior to dressing change.  [] Apply antifungal ointment to skin surrounding the wound prior to dressing change.  [] Apply thin film of moisture barrier ointment to skin immediately around wound.   [] Apply Betadine to skin immediately around wound   [] Other:      Dressings:           Wound Location   Left Leg/foot   [x] Apply Primary Dressing:       [] MediHoney Gel [] MediHoney Alginate  [x] Calcium Alginate with Silver - SILVERCEL 2ND [] Calcium Alginate without silver   [] Collagen with silver [] Collagen without Silver    [] Santyl with moist saline gauze     [] Hydrofera Blue (cut to size and moistened with normal saline)  [] Hydrofera Blue Ready (cut to size)      [] Normal Saline wet to dry  [] Betadine wet to dry    [] Hydrogel  [] Mepitel     [] Bactroban/Mupirocin   [] Iodoform Packing Strip [] Plain Packing Strip   [] Skin Sub:   [x] Other: ADAPTIC 1ST,   [x] Cover and Secure
adherent; Alginate with Ag;Gauze dressing/dressing sponge;ABD;Other (comment) 08/15/23 1227   Wound Length (cm) 7 cm 10/18/23 1511   Wound Width (cm) 4.4 cm 10/18/23 1511   Wound Depth (cm) 0.1 cm 10/18/23 1511   Wound Surface Area (cm^2) 30.8 cm^2 10/18/23 1511   Change in Wound Size % (l*w) 95.81 10/18/23 1511   Wound Volume (cm^3) 3.08 cm^3 10/18/23 1511   Wound Healing % 98 10/18/23 1511   Post-Procedure Length (cm) 7.4 cm 10/11/23 1441   Post-Procedure Width (cm) 4.5 cm 10/11/23 1441   Post-Procedure Depth (cm) 0.1 cm 10/11/23 1441   Post-Procedure Surface Area (cm^2) 33.3 cm^2 10/11/23 1441   Post-Procedure Volume (cm^3) 3.33 cm^3 10/11/23 1441   Wound Assessment Slough;Granulation tissue 10/18/23 1457   Drainage Amount Moderate (25-50%) 10/18/23 1457   Drainage Description Serosanguinous 10/18/23 1457   Odor None 10/18/23 1457   Emy-wound Assessment Fragile; Maceration 10/18/23 1457   Margins Attached edges 10/18/23 1457   Wound Thickness Description not for Pressure Injury Full thickness 10/18/23 1457   Number of days: 161       Wound 10/11/23 Foot Left;Lateral #2 (Active)   Wound Image   10/18/23 1457   Wound Etiology Venous 10/18/23 1457   Dressing Status Dry;Clean; Intact 10/18/23 1457   Wound Cleansed Cleansed with saline 10/18/23 1457   Wound Length (cm) 0.3 cm 10/18/23 1457   Wound Width (cm) 1 cm 10/18/23 1457   Wound Depth (cm) 0.1 cm 10/18/23 1457   Wound Surface Area (cm^2) 0.3 cm^2 10/18/23 1457   Change in Wound Size % (l*w) 91.43 10/18/23 1457   Wound Volume (cm^3) 0.03 cm^3 10/18/23 1457   Wound Healing % 96 10/18/23 1457   Wound Assessment Pale granulation tissue;Slough 10/18/23 1457   Drainage Amount Moderate (25-50%) 10/18/23 1457   Drainage Description Serosanguinous 10/18/23 1457   Odor None 10/18/23 1457   Emy-wound Assessment Intact 10/18/23 1457   Margins Attached edges 10/18/23 1457   Wound Thickness Description not for Pressure Injury Partial thickness 10/18/23 1457   Number of days: 7

## 2023-10-25 ENCOUNTER — HOSPITAL ENCOUNTER (OUTPATIENT)
Facility: HOSPITAL | Age: 68
Discharge: HOME | End: 2023-10-25
Attending: SPECIALIST
Payer: MEDICARE

## 2023-10-25 VITALS
OXYGEN SATURATION: 98 % | SYSTOLIC BLOOD PRESSURE: 140 MMHG | RESPIRATION RATE: 22 BRPM | DIASTOLIC BLOOD PRESSURE: 73 MMHG | TEMPERATURE: 98 F | HEART RATE: 98 BPM

## 2023-10-25 DIAGNOSIS — L97.822 NON-PRESSURE CHRONIC ULCER OF OTHER PART OF LEFT LOWER LEG WITH FAT LAYER EXPOSED (HCC): ICD-10-CM

## 2023-10-25 DIAGNOSIS — S91.301D OPEN WOUND OF RIGHT FOOT, SUBSEQUENT ENCOUNTER: Primary | ICD-10-CM

## 2023-10-25 PROCEDURE — 11042 DBRDMT SUBQ TIS 1ST 20SQCM/<: CPT

## 2023-10-25 PROCEDURE — 11045 DBRDMT SUBQ TISS EACH ADDL: CPT

## 2023-10-25 RX ORDER — SODIUM CHLOR/HYPOCHLOROUS ACID 0.033 %
SOLUTION, IRRIGATION IRRIGATION ONCE
OUTPATIENT
Start: 2023-10-25 | End: 2023-10-25

## 2023-10-25 RX ORDER — TRIAMCINOLONE ACETONIDE 1 MG/G
OINTMENT TOPICAL ONCE
OUTPATIENT
Start: 2023-10-25 | End: 2023-10-25

## 2023-10-25 RX ORDER — LIDOCAINE HYDROCHLORIDE 20 MG/ML
JELLY TOPICAL ONCE
OUTPATIENT
Start: 2023-10-25 | End: 2023-10-25

## 2023-10-25 ASSESSMENT — PAIN SCALES - GENERAL: PAINLEVEL_OUTOF10: 7

## 2023-10-25 NOTE — DISCHARGE INSTRUCTIONS
Wound Clinic Physician Orders and Discharge Instructions  [address] [place] [address] S. 709 [facility], [address] [place] [address] Way  Telephone: [phone]    NAME:  Sebastian Malone OF BIRTH:  1955  MEDICAL RECORD NUMBER:  [id number]  DATE:  10/25/2023      Return Appointment:  [] Dressing Supply Provider:   [x] Home Healthcare: Alexsander Cannon  [x] Return Appointment: 1 Week(s)  [] Nurse Visit:     [] Discharge from [facility]: [] Healed        [] Refer to Provider:         [] Consult    Follow-up Information:  [] Ordered Tests:  [] Rx:   [x] Other: refer to [address] [place] [address] for right foot gout    Wound Cleansing:   Do not scrub or use excessive force. Cleanse wound prior to applying a clean dressing with:  [x] Normal Saline OR   [] Keep Wound Dry in Shower     [] Wound Cleanser   [x] Cleanse wound with Mild Soap & Water    [] Other:       Topical Treatments:  Do not apply lotions, creams, or ointments to wound bed unless directed. [] Apply moisturizing lotion to skin surrounding the wound prior to dressing change.  [] Apply antifungal ointment to skin surrounding the wound prior to dressing change.  [] Apply thin film of moisture barrier ointment to skin immediately around wound.   [] Apply Betadine to skin immediately around wound   [] Other:      Dressings:           Wound Location   Left Leg  [x] Apply Primary Dressing:       [] MediHoney Gel [] MediHoney Alginate  [x] Calcium Alginate with Silver - SILVERCEL 2ND [] Calcium Alginate without silver   [] Collagen with silver [] Collagen without Silver    [] Santyl with moist saline gauze     [] Hydrofera Blue (cut to size and moistened with normal saline)  [] Hydrofera Blue Ready (cut to size)      [] Normal Saline wet to dry  [] Betadine wet to dry    [] Hydrogel  [] Mepitel     [] Bactroban/Mupirocin   [] Iodoform Packing Strip [] Plain Packing Strip   [] Skin Sub:   [x] Other: ADAPTIC 1ST,   [x] Cover and Secure with:

## 2023-10-25 NOTE — WOUND CARE
Multilayer Compression Wrap   (Not Unna) Below the Knee    NAME:  Mumtaz Quevedo OF BIRTH:  1955  MEDICAL RECORD NUMBER:  [id number]  DATE:  10/25/2023    Multilayer compression wrap: Removed old Multilayer wrap if indicated and wash leg with mild soap/water. Applied primary and secondary dressing as ordered. Applied multilayered dressing below the knee to left lower leg. Instructed patient/caregiver not to remove dressing and to keep it clean and dry. Instructed patient/caregiver on complications to report to provider, such as pain, numbness in toes, heavy drainage, and slippage of dressing. Instructed patient on purpose of compression dressing and on activity and exercise recommendations.       Electronically signed by Sharif Carlin RN on 10/25/2023 at 2:31 PM
Wound Clinic Physician Orders and Discharge Instructions  [address] [place] [address] S. 709 [facility], [address] [place] [address] Way  Telephone: [phone]    NAME:  Majo Denton OF BIRTH:  1955  MEDICAL RECORD NUMBER:  [id number]  DATE:  10/25/2023      Return Appointment:  [] Dressing Supply Provider:   [x] Home Healthcare: Vivek Ramsey  [x] Return Appointment: 1 Week(s)  [] Nurse Visit:     [] Discharge from [facility]: [] Healed        [] Refer to Provider:         [] Consult    Follow-up Information:  [] Ordered Tests:  [] Rx:   [x] Other: refer to [address] [place] [address] for right foot gout    Wound Cleansing:   Do not scrub or use excessive force. Cleanse wound prior to applying a clean dressing with:  [x] Normal Saline OR   [] Keep Wound Dry in Shower     [] Wound Cleanser   [x] Cleanse wound with Mild Soap & Water    [] Other:       Topical Treatments:  Do not apply lotions, creams, or ointments to wound bed unless directed. [] Apply moisturizing lotion to skin surrounding the wound prior to dressing change.  [] Apply antifungal ointment to skin surrounding the wound prior to dressing change.  [] Apply thin film of moisture barrier ointment to skin immediately around wound.   [] Apply Betadine to skin immediately around wound   [] Other:      Dressings:           Wound Location   Left Leg  [x] Apply Primary Dressing:       [] MediHoney Gel [] MediHoney Alginate  [x] Calcium Alginate with Silver - SILVERCEL 2ND [] Calcium Alginate without silver   [] Collagen with silver [] Collagen without Silver    [] Santyl with moist saline gauze     [] Hydrofera Blue (cut to size and moistened with normal saline)  [] Hydrofera Blue Ready (cut to size)      [] Normal Saline wet to dry  [] Betadine wet to dry    [] Hydrogel  [] Mepitel     [] Bactroban/Mupirocin   [] Iodoform Packing Strip [] Plain Packing Strip   [] Skin Sub:   [x] Other: ADAPTIC 1ST,   [x] Cover and Secure with:
while awake 360 mL 1    albuterol sulfate HFA (PROVENTIL;VENTOLIN;PROAIR) 108 (90 Base) MCG/ACT inhaler Inhale 1 puff into the lungs every 4 hours as needed      ascorbic acid (VITAMIN C) 500 MG tablet Take 2 tablets by mouth 2 times daily      aspirin 81 MG chewable tablet Take 1 tablet by mouth daily      atorvastatin (LIPITOR) 40 MG tablet Take 1 tablet by mouth nightly at bedtime. B Complex Vitamins (VITAMIN B COMPLEX) TABS Take 1 tablet by mouth daily      colchicine (COLCRYS) 0.6 MG tablet Take 1 tablet by mouth daily      flunisolide (NASALIDE) 25 MCG/ACT (0.025%) SOLN 2 sprays 2 times daily      fluticasone-umeclidin-vilant (TRELEGY ELLIPTA) 100-62.5-25 MCG/ACT AEPB inhaler INHALE 1 INHALATION BY MOUTH ONCE DAILY      gabapentin (NEURONTIN) 400 MG capsule Take 1 capsule by mouth 4 times daily. Insulin Glargine, 2 Unit Dial, (TOUJEO MAX SOLOSTAR) 300 UNIT/ML SOPN INJECT 80 UNITS SUBCUTANEOUSLY IN THE MORNING AND 32 IN THE EVENING      MM PEN NEEDLES 31G X 8 MM MISC USE 1 NEW PEN NEEDLE TO INJECT INSULIN SUBCUTANEOUSLY FIVE TIMES DAILY      vitamin E 400 UNIT capsule Take 2 capsules by mouth 2 times daily       No current facility-administered medications on file prior to encounter. REVIEW OF SYSTEMS  A comprehensive review of systems was negative except for what has been indicated above and: no change    ASSESSMENT:  Some Improvement in LLE ulcer; R 1st met head deformity presumed to represent acute gout episode without improvement    PLAN:  Continue Adaptic, silvercel, drawtex, coflex lite to LLE  Betadine paint to R forefoot wound  X-ray of R foot  F/u with Podiatry    Written patient dismissal instructions given to patient and signed by patient or POA. Discharge Instructions              Wound Clinic Physician Orders and Discharge Instructions  [address] [place], [place] [address] S.  [address] [place] [address], [address] [place] [address] Way  Telephone: [phone]

## 2023-11-01 ENCOUNTER — HOSPITAL ENCOUNTER (OUTPATIENT)
Facility: HOSPITAL | Age: 68
Discharge: HOME | End: 2023-11-01
Attending: SPECIALIST
Payer: MEDICARE

## 2023-11-01 VITALS
HEART RATE: 89 BPM | DIASTOLIC BLOOD PRESSURE: 83 MMHG | RESPIRATION RATE: 20 BRPM | SYSTOLIC BLOOD PRESSURE: 138 MMHG | TEMPERATURE: 97.7 F

## 2023-11-01 DIAGNOSIS — L97.822 NON-PRESSURE CHRONIC ULCER OF OTHER PART OF LEFT LOWER LEG WITH FAT LAYER EXPOSED (HCC): ICD-10-CM

## 2023-11-01 DIAGNOSIS — S91.301D OPEN WOUND OF RIGHT FOOT, SUBSEQUENT ENCOUNTER: Primary | ICD-10-CM

## 2023-11-01 PROCEDURE — 11045 DBRDMT SUBQ TISS EACH ADDL: CPT

## 2023-11-01 PROCEDURE — 11042 DBRDMT SUBQ TIS 1ST 20SQCM/<: CPT

## 2023-11-01 RX ORDER — SODIUM CHLOR/HYPOCHLOROUS ACID 0.033 %
SOLUTION, IRRIGATION IRRIGATION ONCE
OUTPATIENT
Start: 2023-11-01 | End: 2023-11-01

## 2023-11-01 RX ORDER — LIDOCAINE HYDROCHLORIDE 20 MG/ML
JELLY TOPICAL ONCE
OUTPATIENT
Start: 2023-11-01 | End: 2023-11-01

## 2023-11-01 RX ORDER — TRIAMCINOLONE ACETONIDE 1 MG/G
OINTMENT TOPICAL ONCE
OUTPATIENT
Start: 2023-11-01 | End: 2023-11-01

## 2023-11-01 ASSESSMENT — PAIN SCALES - GENERAL: PAINLEVEL_OUTOF10: 3

## 2023-11-01 ASSESSMENT — PAIN DESCRIPTION - DESCRIPTORS: DESCRIPTORS: ACHING

## 2023-11-01 ASSESSMENT — PAIN DESCRIPTION - LOCATION: LOCATION: LEG

## 2023-11-01 ASSESSMENT — PAIN DESCRIPTION - ORIENTATION: ORIENTATION: LEFT

## 2023-11-01 NOTE — WOUND CARE
Wound Clinic Physician Orders and Discharge Instructions  [address] [place] [address] S. 709 [facility], [address]  Telephone: [phone]    NAME:  Jayson Jordan OF BIRTH:  1955  MEDICAL RECORD NUMBER:  [id number]  DATE:  11/1/2023      Return Appointment:  [] Dressing Supply Provider:   [x] Home Healthcare: Lillian Nick  [x] Return Appointment: 1 Week(s)  [] Nurse Visit:     [] Discharge from [facility]: [] Healed        [] Refer to Provider:         [] Consult    Follow-up Information:  [] Ordered Tests:  [] Rx:   [x] Other: refer to [address] [place] [address] for right foot gout    Wound Cleansing:   Do not scrub or use excessive force. Cleanse wound prior to applying a clean dressing with:  [x] Normal Saline OR   [] Keep Wound Dry in Shower     [] Wound Cleanser   [x] Cleanse wound with Mild Soap & Water    [] Other:       Topical Treatments:  Do not apply lotions, creams, or ointments to wound bed unless directed. [] Apply moisturizing lotion to skin surrounding the wound prior to dressing change.  [] Apply antifungal ointment to skin surrounding the wound prior to dressing change.  [] Apply thin film of moisture barrier ointment to skin immediately around wound.   [] Apply Betadine to skin immediately around wound   [] Other:      Dressings:           Wound Location   Left Leg  [x] Apply Primary Dressing:       [] MediHoney Gel [] MediHoney Alginate  [x] Calcium Alginate with Silver - SILVERCEL 2ND [] Calcium Alginate without silver   [] Collagen with silver [] Collagen without Silver    [] Santyl with moist saline gauze     [] Hydrofera Blue (cut to size and moistened with normal saline)  [] Hydrofera Blue Ready (cut to size)      [] Normal Saline wet to dry  [] Betadine wet to dry    [] Hydrogel  [] Mepitel     [] Bactroban/Mupirocin   [] Iodoform Packing Strip [] Plain Packing Strip   [] Skin Sub:   [x] Other: ADAPTIC 1ST,   [x] Cover and Secure with:

## 2023-11-01 NOTE — WOUND CARE
Wound Clinic Physician Orders and Discharge Instructions  [address] [place] [address] S. 709 [facility], [address] [place] [address] Way  Telephone: [phone]    NAME:  Rina Rodriguez OF BIRTH:  1955  MEDICAL RECORD NUMBER:  [id number]  DATE:  11/1/2023      Return Appointment:  [] Dressing Supply Provider:   [x] Home Healthcare: Yuliya Turner  [x] Return Appointment: 1 Week(s)  [] Nurse Visit:     [] Discharge from [facility]: [] Healed        [] Refer to Provider:         [] Consult    Follow-up Information:  [] Ordered Tests:  [] Rx:   [x] Other: refer to [address] [place] [address] for right foot gout    Wound Cleansing:   Do not scrub or use excessive force. Cleanse wound prior to applying a clean dressing with:  [x] Normal Saline OR   [] Keep Wound Dry in Shower     [] Wound Cleanser   [x] Cleanse wound with Mild Soap & Water    [] Other:       Topical Treatments:  Do not apply lotions, creams, or ointments to wound bed unless directed. [] Apply moisturizing lotion to skin surrounding the wound prior to dressing change.  [] Apply antifungal ointment to skin surrounding the wound prior to dressing change.  [] Apply thin film of moisture barrier ointment to skin immediately around wound.   [] Apply Betadine to skin immediately around wound   [] Other:      Dressings:           Wound Location   Left Leg  [x] Apply Primary Dressing:       [] MediHoney Gel [] MediHoney Alginate  [x] Calcium Alginate with Silver - SILVERCEL 2ND [] Calcium Alginate without silver   [] Collagen with silver [] Collagen without Silver    [] Santyl with moist saline gauze     [] Hydrofera Blue (cut to size and moistened with normal saline)  [] Hydrofera Blue Ready (cut to size)      [] Normal Saline wet to dry  [] Betadine wet to dry    [] Hydrogel  [] Mepitel     [] Bactroban/Mupirocin   [] Iodoform Packing Strip [] Plain Packing Strip   [] Skin Sub:   [x] Other: ADAPTIC 1ST,   [x] Cover and Secure with:

## 2023-11-01 NOTE — WOUND CARE
[facility]   Medical Staff Progress Note     [address] RECORD NUMBER:  [id number]  AGE: 75 y.o. GENDER: male  : 1955  EPISODE DATE:  2023    Chief complaint and reason for visit:   LLE, R foot  Chief Complaint   Patient presents with    Wound Check     R foot, L Leg      Patient presenting for follow up evaluation of wound(s) per chief complaint. Subjective: Symptoms, wound related issues, or other pertinent wound history since last visit: No new problems reported. No fever, moderate drainage, some pain. Still has not been seen by Podiatry regarding the right foot abnormality. Wound 05/10/23 Leg Left #1 circ (Active)   Wound Image   23 144   Wound Etiology Venous 23   Dressing Status Clean;Dry; Intact 23   Wound Cleansed Soap and water 23   Dressing/Treatment Non adherent; Alginate with Ag;Gauze dressing/dressing sponge;ABD;Other (comment) 08/15/23 1227   Wound Length (cm) 5.7 cm 23 1448   Wound Width (cm) 4.2 cm 23 144   Wound Depth (cm) 0.1 cm 23 144   Wound Surface Area (cm^2) 23.94 cm^2 23   Change in Wound Size % (l*w) 96.74 23 144   Wound Volume (cm^3) 2.394 cm^3 23 144   Wound Healing % 98 23 1448   Post-Procedure Length (cm) 5.7 cm 23 1448   Post-Procedure Width (cm) 4.2 cm 23 1448   Post-Procedure Depth (cm) 0.1 cm 23 1448   Post-Procedure Surface Area (cm^2) 23.94 cm^2 23 1448   Post-Procedure Volume (cm^3) 2.394 cm^3 23 1448   Wound Assessment Slough;Granulation tissue 23   Drainage Amount Moderate (25-50%) 23   Drainage Description Serosanguinous 23   Odor None 23   Emy-wound Assessment Intact 11/01/23 1448   Margins Attached edges 23 1448   Wound Thickness Description not for Pressure Injury Full thickness 10/25/23 0511

## 2023-11-01 NOTE — WOUND CARE
Multilayer Compression Wrap   (Not Unna) Below the Knee    NAME:  Jayson Jordan OF BIRTH:  1955  MEDICAL RECORD NUMBER:  [id number]  DATE:  11/1/2023    Multilayer compression wrap: Removed old Multilayer wrap if indicated and wash leg with mild soap/water. Applied primary and secondary dressing as ordered. Applied multilayered dressing below the knee to left lower leg. Instructed patient/caregiver not to remove dressing and to keep it clean and dry. Instructed patient/caregiver on complications to report to provider, such as pain, numbness in toes, heavy drainage, and slippage of dressing. Instructed patient on purpose of compression dressing and on activity and exercise recommendations.       Electronically signed by Nany Harmon RN on 11/1/2023 at 3:01 PM

## 2023-11-01 NOTE — DISCHARGE INSTRUCTIONS
[x] Gauze [] Aris [] Kerlix   [] Foam  [x] Super Absorbant  [] ABD     [] Ace Wrap [] Other:    Limit contact of tape with skin. Compression:  Apply: [x] Multilayer Compression Wrap to []RightLeg [x]Left Leg    []  Coflex [x] Coflex Lite  [] Unna with Calamine Lite      [x] Change dressing: [] Daily    [] Every Other Day   [x] Twice per week HH  [] Three times per week   [] Once a week [] Do Not Change Dressing   [] Other:             Dressings:           Wound Location   Right Foot  [x] Apply Primary Dressing:       [] MediHoney Gel [] MediHoney Alginate  [] Calcium Alginate with Silver - SILVERCEL 2ND [] Calcium Alginate without silver   [] Collagen with silver [] Collagen without Silver    [] Santyl with moist saline gauze     [] Hydrofera Blue (cut to size and moistened with normal saline)  [] Hydrofera Blue Ready (cut to size)      [] Normal Saline wet to dry  [] Betadine wet to dry    [] Hydrogel  [] Mepitel     [] Bactroban/Mupirocin   [] Iodoform Packing Strip [] Plain Packing Strip   [] Skin Sub:   [x] Other: betadine wet to dry  [x] Cover and Secure with:     [x] Gauze [] Aris [] Kerlix   [] Foam  [x] Super Absorbant  [] ABD     [] Ace Wrap [] Other:    Limit contact of tape with skin. [x] Change dressing: [] Daily    [] Every Other Day   [x] Twice per week HH  [] Three times per week   [] Once a week [] Do Not Change Dressing   [] Other:     Edema Control:  Apply: [] Compression Stocking:  mmHg  []Right Leg []Left Leg   [x] Tubigrip [x]Right Leg Double Layer []Left Leg Double Layer      []Right Leg Single Layer []Left Leg Single Layer      [x] Elevate leg(s) above the level of the heart when sitting. [x] Avoid prolonged standing in one place. [x] Do not get leg(s) with wrap wet. If wrap becomes too tight, call the wound center and remove wrap from leg by unrolling each layer.   Off-Loading:   [] Off-loading when: [] walking  [] in bed [] sitting  [] Total non-weight bearing  [] Right Leg

## 2023-11-01 NOTE — WOUND CARE
Wound Clinic Physician Orders and Discharge Instructions  [address] [place] [address] S. 709 [facility], [address] [place] [address] Way  Telephone: [phone]    NAME:  Minnie Swain OF BIRTH:  1955  MEDICAL RECORD NUMBER:  [id number]  DATE:  11/1/2023      Return Appointment:  [] Dressing Supply Provider:   [x] Home Healthcare: Zuly You  [x] Return Appointment: 1 Week(s)  [] Nurse Visit:     [] Discharge from [facility]: [] Healed        [] Refer to Provider:         [] Consult    Follow-up Information:  [] Ordered Tests:  [] Rx:   [x] Other: refer to [address] [place] [address] for right foot gout    Wound Cleansing:   Do not scrub or use excessive force. Cleanse wound prior to applying a clean dressing with:  [x] Normal Saline OR   [] Keep Wound Dry in Shower     [] Wound Cleanser   [x] Cleanse wound with Mild Soap & Water    [] Other:       Topical Treatments:  Do not apply lotions, creams, or ointments to wound bed unless directed. [] Apply moisturizing lotion to skin surrounding the wound prior to dressing change.  [] Apply antifungal ointment to skin surrounding the wound prior to dressing change.  [] Apply thin film of moisture barrier ointment to skin immediately around wound.   [] Apply Betadine to skin immediately around wound   [] Other:      Dressings:           Wound Location   Left Leg  [x] Apply Primary Dressing:       [] MediHoney Gel [] MediHoney Alginate  [x] Calcium Alginate with Silver - SILVERCEL 2ND [] Calcium Alginate without silver   [] Collagen with silver [] Collagen without Silver    [] Santyl with moist saline gauze     [] Hydrofera Blue (cut to size and moistened with normal saline)  [] Hydrofera Blue Ready (cut to size)      [] Normal Saline wet to dry  [] Betadine wet to dry    [] Hydrogel  [] Mepitel     [] Bactroban/Mupirocin   [] Iodoform Packing Strip [] Plain Packing Strip   [] Skin Sub:   [x] Other: ADAPTIC 1ST,   [x] Cover and Secure with:

## 2023-11-06 ENCOUNTER — HOSPITAL ENCOUNTER (OUTPATIENT)
Facility: HOSPITAL | Age: 68
Discharge: HOME | End: 2023-11-09
Payer: MEDICARE

## 2023-11-06 DIAGNOSIS — M1A.9XX1 CHRONIC TOPHACEOUS GOUT OF RIGHT FOOT: ICD-10-CM

## 2023-11-06 PROCEDURE — 73630 X-RAY EXAM OF FOOT: CPT

## 2023-11-07 ENCOUNTER — OFFICE VISIT (OUTPATIENT)
Age: 68
End: 2023-11-07
Payer: MEDICARE

## 2023-11-07 VITALS
SYSTOLIC BLOOD PRESSURE: 109 MMHG | HEIGHT: 60 IN | TEMPERATURE: 97.7 F | WEIGHT: 315 LBS | OXYGEN SATURATION: 98 % | RESPIRATION RATE: 16 BRPM | HEART RATE: 80 BPM | DIASTOLIC BLOOD PRESSURE: 63 MMHG | BODY MASS INDEX: 61.84 KG/M2

## 2023-11-07 DIAGNOSIS — B35.3 TINEA PEDIS OF BOTH FEET: Primary | ICD-10-CM

## 2023-11-07 DIAGNOSIS — Z22.322 MRSA (METHICILLIN RESISTANT STAPH AUREUS) CULTURE POSITIVE: ICD-10-CM

## 2023-11-07 DIAGNOSIS — I87.2 VENOUS STASIS DERMATITIS OF BOTH LOWER EXTREMITIES: ICD-10-CM

## 2023-11-07 DIAGNOSIS — M1A.9XX1 CHRONIC TOPHACEOUS GOUT OF RIGHT FOOT: ICD-10-CM

## 2023-11-07 PROBLEM — M10.9 GOUT, UNSPECIFIED: Status: ACTIVE | Noted: 2023-08-28

## 2023-11-07 PROBLEM — K74.60 UNSPECIFIED CIRRHOSIS OF LIVER (HCC): Status: ACTIVE | Noted: 2023-08-28

## 2023-11-07 PROBLEM — Z86.16 PERSONAL HISTORY OF COVID-19: Status: ACTIVE | Noted: 2023-08-28

## 2023-11-07 PROBLEM — E11.51 TYPE 2 DIABETES MELLITUS WITH DIABETIC PERIPHERAL ANGIOPATHY WITHOUT GANGRENE (HCC): Status: ACTIVE | Noted: 2023-08-28

## 2023-11-07 PROBLEM — G89.4 CHRONIC PAIN DISORDER: Status: ACTIVE | Noted: 2023-08-28

## 2023-11-07 PROBLEM — I11.0 HYPERTENSIVE HEART DISEASE WITH HEART FAILURE (HCC): Status: ACTIVE | Noted: 2023-08-28

## 2023-11-07 PROBLEM — M19.90 UNSPECIFIED OSTEOARTHRITIS, UNSPECIFIED SITE: Status: ACTIVE | Noted: 2023-08-28

## 2023-11-07 PROBLEM — Z95.0 PRESENCE OF CARDIAC PACEMAKER: Status: ACTIVE | Noted: 2023-08-28

## 2023-11-07 PROBLEM — Z99.81 DEPENDENCE ON SUPPLEMENTAL OXYGEN: Status: ACTIVE | Noted: 2023-08-28

## 2023-11-07 PROBLEM — G47.33 OBSTRUCTIVE SLEEP APNEA (ADULT) (PEDIATRIC): Status: ACTIVE | Noted: 2023-08-28

## 2023-11-07 PROBLEM — R29.898 OTHER SYMPTOMS AND SIGNS INVOLVING THE MUSCULOSKELETAL SYSTEM: Status: ACTIVE | Noted: 2023-08-28

## 2023-11-07 PROBLEM — R26.2 DIFFICULTY IN WALKING, NOT ELSEWHERE CLASSIFIED: Status: ACTIVE | Noted: 2023-08-28

## 2023-11-07 PROBLEM — I25.10 ATHEROSCLEROTIC HEART DISEASE OF NATIVE CORONARY ARTERY WITHOUT ANGINA PECTORIS: Status: ACTIVE | Noted: 2023-08-28

## 2023-11-07 PROBLEM — J44.9 CHRONIC OBSTRUCTIVE PULMONARY DISEASE, UNSPECIFIED (HCC): Status: ACTIVE | Noted: 2023-08-28

## 2023-11-07 PROBLEM — E66.01 MORBID (SEVERE) OBESITY DUE TO EXCESS CALORIES (HCC): Status: ACTIVE | Noted: 2023-08-28

## 2023-11-07 PROBLEM — M62.81 MUSCLE WEAKNESS (GENERALIZED): Status: ACTIVE | Noted: 2023-08-28

## 2023-11-07 PROBLEM — R27.8 OTHER LACK OF COORDINATION: Status: ACTIVE | Noted: 2023-08-28

## 2023-11-07 PROCEDURE — 3017F COLORECTAL CA SCREEN DOC REV: CPT | Performed by: INTERNAL MEDICINE

## 2023-11-07 PROCEDURE — G8417 CALC BMI ABV UP PARAM F/U: HCPCS | Performed by: INTERNAL MEDICINE

## 2023-11-07 PROCEDURE — 1036F TOBACCO NON-USER: CPT | Performed by: INTERNAL MEDICINE

## 2023-11-07 PROCEDURE — G8427 DOCREV CUR MEDS BY ELIG CLIN: HCPCS | Performed by: INTERNAL MEDICINE

## 2023-11-07 PROCEDURE — 99214 OFFICE O/P EST MOD 30 MIN: CPT | Performed by: INTERNAL MEDICINE

## 2023-11-07 PROCEDURE — G8484 FLU IMMUNIZE NO ADMIN: HCPCS | Performed by: INTERNAL MEDICINE

## 2023-11-07 PROCEDURE — 1123F ACP DISCUSS/DSCN MKR DOCD: CPT | Performed by: INTERNAL MEDICINE

## 2023-11-07 RX ORDER — DOXYCYCLINE HYCLATE 100 MG/1
100 CAPSULE ORAL 2 TIMES DAILY
COMMUNITY
Start: 2023-10-27

## 2023-11-07 RX ORDER — ALLOPURINOL 100 MG/1
200 TABLET ORAL DAILY
Qty: 60 TABLET | Refills: 5 | Status: SHIPPED | OUTPATIENT
Start: 2023-11-07 | End: 2023-12-07

## 2023-11-07 RX ORDER — MICONAZOLE NITRATE 20 MG/G
POWDER TOPICAL
Qty: 45 G | Refills: 1 | Status: SHIPPED | OUTPATIENT
Start: 2023-11-07

## 2023-11-07 ASSESSMENT — ENCOUNTER SYMPTOMS
RESPIRATORY NEGATIVE: 1
GASTROINTESTINAL NEGATIVE: 1

## 2023-11-07 NOTE — PROGRESS NOTES
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 He is on a 2 wk course of Doxycycline for MRSA coverage, which I think shouldve been completed by now but he claims he still has a couple of days left. He notes decreased drainage from his right great toe, has an up coming apt w Podiatry. X-ray of his right foot on 11/07 revealed \"hyperdense first MTP erosive change with marked soft tissue swelling could represent gout but superimposed osteomyelitis is not excluded. Pt denies acute complaints on assessment today. ROS  Review of Systems   Constitutional: Negative. Respiratory: Negative. Cardiovascular: Negative. Gastrointestinal: Negative. Genitourinary: Negative. Musculoskeletal:         B/l leg swelling        Past Medical History:   Diagnosis Date    Arthritis     CAD (coronary artery disease)     Chronic obstructive pulmonary disease (HCC)     Diabetes ([address])     Gout     Hypertension     Ill-defined condition     Sleep apnea         Past Surgical History:   Procedure Laterality Date    HX VEIN ABLATION ADHESIVE      ORTHOPEDIC SURGERY      PACEMAKER          Social History     Tobacco Use    Smoking status: Never    Smokeless tobacco: Never   Substance Use Topics    Alcohol use: Not Currently    Drug use: Never        Family History   Problem Relation Age of Onset    Diabetes Mother     Heart Disease Father     Hypertension Father     Diabetes Sister     Diabetes Brother     Hypertension Mother     Heart Disease Mother     Kidney Disease Mother         Allergies   Allergen Reactions    Amitriptyline Angioedema    Naproxen      Other reaction(s): Unknown (comments)  Pt not sure of reaction    Sulfa Antibiotics Nausea And Vomiting        Objective  Physical Exam  Constitutional:       Appearance: Normal appearance.    Cardiovascular:      Rate and Rhythm: Normal rate and regular

## 2023-11-08 ENCOUNTER — OFFICE VISIT (OUTPATIENT)
Age: 68
End: 2023-11-08
Payer: MEDICARE

## 2023-11-08 ENCOUNTER — HOSPITAL ENCOUNTER (OUTPATIENT)
Facility: HOSPITAL | Age: 68
Discharge: HOME | End: 2023-11-08
Attending: SPECIALIST
Payer: MEDICARE

## 2023-11-08 VITALS
TEMPERATURE: 98.4 F | SYSTOLIC BLOOD PRESSURE: 108 MMHG | HEART RATE: 89 BPM | OXYGEN SATURATION: 96 % | DIASTOLIC BLOOD PRESSURE: 56 MMHG

## 2023-11-08 VITALS — DIASTOLIC BLOOD PRESSURE: 84 MMHG | SYSTOLIC BLOOD PRESSURE: 133 MMHG | TEMPERATURE: 99.1 F | HEART RATE: 98 BPM

## 2023-11-08 DIAGNOSIS — M1A.0711 IDIOPATHIC CHRONIC GOUT OF RIGHT FOOT WITH TOPHUS: Primary | ICD-10-CM

## 2023-11-08 DIAGNOSIS — L97.822 NON-PRESSURE CHRONIC ULCER OF OTHER PART OF LEFT LOWER LEG WITH FAT LAYER EXPOSED (HCC): ICD-10-CM

## 2023-11-08 DIAGNOSIS — S91.301D OPEN WOUND OF RIGHT FOOT, SUBSEQUENT ENCOUNTER: Primary | ICD-10-CM

## 2023-11-08 PROCEDURE — 1123F ACP DISCUSS/DSCN MKR DOCD: CPT | Performed by: PODIATRIST

## 2023-11-08 PROCEDURE — 3017F COLORECTAL CA SCREEN DOC REV: CPT | Performed by: PODIATRIST

## 2023-11-08 PROCEDURE — G8417 CALC BMI ABV UP PARAM F/U: HCPCS | Performed by: PODIATRIST

## 2023-11-08 PROCEDURE — G8427 DOCREV CUR MEDS BY ELIG CLIN: HCPCS | Performed by: PODIATRIST

## 2023-11-08 PROCEDURE — 1036F TOBACCO NON-USER: CPT | Performed by: PODIATRIST

## 2023-11-08 PROCEDURE — 11045 DBRDMT SUBQ TISS EACH ADDL: CPT

## 2023-11-08 PROCEDURE — 99204 OFFICE O/P NEW MOD 45 MIN: CPT | Performed by: PODIATRIST

## 2023-11-08 PROCEDURE — G8484 FLU IMMUNIZE NO ADMIN: HCPCS | Performed by: PODIATRIST

## 2023-11-08 PROCEDURE — 11042 DBRDMT SUBQ TIS 1ST 20SQCM/<: CPT

## 2023-11-08 RX ORDER — LIDOCAINE HYDROCHLORIDE 20 MG/ML
JELLY TOPICAL ONCE
OUTPATIENT
Start: 2023-11-08 | End: 2023-11-08

## 2023-11-08 RX ORDER — TRIAMCINOLONE ACETONIDE 1 MG/G
OINTMENT TOPICAL ONCE
OUTPATIENT
Start: 2023-11-08 | End: 2023-11-08

## 2023-11-08 RX ORDER — SODIUM CHLOR/HYPOCHLOROUS ACID 0.033 %
SOLUTION, IRRIGATION IRRIGATION ONCE
OUTPATIENT
Start: 2023-11-08 | End: 2023-11-08

## 2023-11-08 ASSESSMENT — PAIN DESCRIPTION - DESCRIPTORS: DESCRIPTORS: ACHING

## 2023-11-08 ASSESSMENT — PAIN SCALES - GENERAL: PAINLEVEL_OUTOF10: 7

## 2023-11-08 ASSESSMENT — PAIN DESCRIPTION - ORIENTATION: ORIENTATION: LEFT

## 2023-11-08 ASSESSMENT — PAIN DESCRIPTION - LOCATION: LOCATION: LEG

## 2023-11-08 NOTE — WOUND CARE
Multilayer Compression Wrap   (Not Unna) Below the Knee    NAME:  Christian Leon OF BIRTH:  1955  MEDICAL RECORD NUMBER:  [id number]  DATE:  11/8/2023    Multilayer compression wrap: Removed old Multilayer wrap if indicated and wash leg with mild soap/water. Applied primary and secondary dressing as ordered. Applied multilayered dressing below the knee to left lower leg. Instructed patient/caregiver not to remove dressing and to keep it clean and dry. Instructed patient/caregiver on complications to report to provider, such as pain, numbness in toes, heavy drainage, and slippage of dressing. Instructed patient on purpose of compression dressing and on activity and exercise recommendations.       Electronically signed by Deanna Galan RN on 11/8/2023 at 3:06 PM
Wound Clinic Physician Orders and Discharge Instructions  [address] [place] [address] S. 709 [facility], [address] [place] [address] Way  Telephone: [phone]    NAME:  Allen Leventhal OF BIRTH:  1955  MEDICAL RECORD NUMBER:  [id number]  DATE:  11/8/2023      Return Appointment:  [] Dressing Supply Provider:   [x] Home Healthcare: Dakota Miner  [x] Return Appointment: 1 Week(s)  [] Nurse Visit:     [] Discharge from [facility]: [] Healed        [] Refer to Provider:         [] Consult    Follow-up Information:  [] Ordered Tests:  [] Rx:   [x] Other: procedure w/ Dr. Arthur Ornelas next week     Wound Cleansing:   Do not scrub or use excessive force. Cleanse wound prior to applying a clean dressing with:  [x] Normal Saline OR   [] Keep Wound Dry in Shower     [] Wound Cleanser   [x] Cleanse wound with Mild Soap & Water    [] Other:       Topical Treatments:  Do not apply lotions, creams, or ointments to wound bed unless directed. [] Apply moisturizing lotion to skin surrounding the wound prior to dressing change.  [] Apply antifungal ointment to skin surrounding the wound prior to dressing change.  [] Apply thin film of moisture barrier ointment to skin immediately around wound.   [] Apply Betadine to skin immediately around wound   [] Other:      Dressings:           Wound Location: Left leg     [x] Apply Primary Dressing:       [] MediHoney Gel [] MediHoney Alginate  [x] Calcium Alginate with Silver - SILVERCEL 2ND [] Calcium Alginate without silver   [] Collagen with silver [] Collagen without Silver    [] Santyl with moist saline gauze     [] Hydrofera Blue (cut to size and moistened with normal saline)  [] Hydrofera Blue Ready (cut to size)      [] Normal Saline wet to dry  [] Betadine wet to dry    [] Hydrogel  [] Mepitel     [] Bactroban/Mupirocin   [] Iodoform Packing Strip [] Plain Packing Strip   [] Skin Sub:   [x] Other: ADAPTIC - 1ST     [x] Cover and Secure with:
Pressure Injury Full thickness 11/08/23 1408   Number of days: 182       Wound 10/11/23 Foot Right;Medial #2 (Active)   Wound Image   11/08/23 1408   Wound Etiology Other 11/08/23 1408   Dressing Status Other (Comment) 11/08/23 1408   Wound Cleansed Cleansed with saline 11/08/23 1408   Dressing/Treatment Betadine swabs/povidone iodine 11/08/23 1506   Offloading for Diabetic Foot Ulcers Offloading not ordered 10/25/23 1403   Wound Length (cm) 3 cm 11/08/23 1408   Wound Width (cm) 5 cm 11/08/23 1408   Wound Depth (cm) 0.1 cm 11/08/23 1408   Wound Surface Area (cm^2) 15 cm^2 11/08/23 1408   Change in Wound Size % (l*w) -9275 11/08/23 1408   Wound Volume (cm^3) 1.5 cm^3 11/08/23 1408   Wound Healing % -2244 11/08/23 1408   Undermining Starts ___ O'Clock 12 11/01/23 1444   Undermining Ends___ O'Clock 12 11/01/23 1444   Undermining Maxium Distance (cm) 0.6 11/01/23 1444   Wound Assessment Dry;Fluid filled blister 11/08/23 1408   Drainage Amount None (dry) 11/08/23 1408   Drainage Description Serosanguinous 11/01/23 1444   Odor None 11/08/23 1408   Emy-wound Assessment Dry/flaky 11/08/23 1408   Margins Undefined edges 11/08/23 1408   Number of days: 28          Procedures done during this encounter:   Debridement of the left leg wound    TIME: E/M Time spent with patient and/or patient care issues: [] 15-20 min  [] 21-30 min  [] 31-44 min  [] 45 min or more.    This is above the usual time needed to address patient's chief complaint today: [] Yes  [] No  This time includes physician non-face-to-face service time visit on the date of service such as  Preparing to see the patient (eg, review of tests)  Obtaining and/or reviewing separately obtained history  Performing a medically necessary appropriate examination and/or evaluation  Counseling and educating the patient/family/caregiver  Ordering medications, tests, or procedures  Referring and communicating with other health care professionals as needed  Documenting clinical

## 2023-11-08 NOTE — DISCHARGE INSTRUCTIONS
[x] Gauze [] Aris [] Kerlix   [] Foam  [x] Super Absorbant  [] ABD     [] Ace Wrap [] Other:    Limit contact of tape with skin. Compression:  Apply: [x] Multilayer Compression Wrap to []RightLeg [x]Left Leg    []  Coflex [x] Coflex Lite  [] Unna with Calamine Lite    [x] Elevate leg(s) above the level of the heart when sitting. [x] Avoid prolonged standing in one place. [x] Do not get leg(s) with wrap wet. If wrap becomes too tight, call the wound center and remove wrap from leg by unrolling each layer. [x] Change dressing: [] Daily    [] Every Other Day   [x] Twice per week HH  [] Three times per week   [] Once a week [] Do Not Change Dressing   [] Other:    Dressings:           Wound Location: Right foot  [x] Apply Primary Dressing:       [] MediHoney Gel [] MediHoney Alginate  [] Calcium Alginate with Silver - SILVERCEL 2ND [] Calcium Alginate without silver   [] Collagen with silver [] Collagen without Silver    [] Santyl with moist saline gauze     [] Hydrofera Blue (cut to size and moistened with normal saline)  [] Hydrofera Blue Ready (cut to size)      [] Normal Saline wet to dry  [] Betadine wet to dry    [] Hydrogel  [] Mepitel     [] Bactroban/Mupirocin   [] Iodoform Packing Strip [] Plain Packing Strip   [] Skin Sub:   [x] Other: betadine wet to dry  [x] Cover and Secure with:     [x] Gauze [] Aris [] Kerlix   [] Foam  [x] Super Absorbant  [] ABD     [] Ace Wrap [] Other:    Limit contact of tape with skin. [x] Change dressing: [] Daily    [] Every Other Day   [x] Twice per week HH  [] Three times per week   [] Once a week [] Do Not Change Dressing   [] Other:     Edema Control:  Apply: [] Compression Stocking:  mmHg  []Right Leg []Left Leg   [x] Tubigrip [x]Right Leg Double Layer []Left Leg Double Layer      []Right Leg Single Layer []Left Leg Single Layer      [x] Elevate leg(s) above the level of the heart when sitting. [x] Avoid prolonged standing in one place.     [x] Do not

## 2023-11-11 LAB
BACTERIA SPEC AEROBE CULT: NORMAL
BACTERIA SPEC ANAEROBE CULT: NORMAL

## 2023-11-14 ENCOUNTER — TELEPHONE (OUTPATIENT)
Age: 68
End: 2023-11-14

## 2023-11-14 LAB
BACTERIA SPEC AEROBE CULT: NORMAL
BACTERIA SPEC ANAEROBE CULT: NORMAL

## 2023-11-14 ASSESSMENT — ENCOUNTER SYMPTOMS
VOMITING: 0
SHORTNESS OF BREATH: 0
ABDOMINAL PAIN: 0
DIARRHEA: 0

## 2023-11-14 NOTE — TELEPHONE ENCOUNTER
Spoke with patient and told him I did not get orders as Dr. Arthur Ornelas was working on them. I told patient he would need clearance from PCP prior and if he wants surgery scheduled for this week, he needs to follow up with his PCP to see if he needs to be seen prior to Friday. Patient verbalized understanding. Faxing PCP clearance to Dr. Odalys Levy.

## 2023-11-14 NOTE — TELEPHONE ENCOUNTER
PT called and was seen on 11/8/2023 he is saying he is going to have surgery this Friday. .. Paresh Wilkerson but he has NOT been contacted yet. He would like a call from the Surgery Scheduler Please!     Contact # [phone]

## 2023-11-14 NOTE — TELEPHONE ENCOUNTER
Attempted to schedule patient for this Friday, but  said she had to leave early and was not able to schedule anyone else at that time. I told her I will call back in the morning. Left message for patient stating I have not secured a time, but I will follow up with him tomorrow.

## 2023-11-15 ENCOUNTER — HOSPITAL ENCOUNTER (OUTPATIENT)
Facility: HOSPITAL | Age: 68
Discharge: HOME | End: 2023-11-17
Payer: MEDICARE

## 2023-11-15 ENCOUNTER — TRANSCRIBE ORDERS (OUTPATIENT)
Facility: HOSPITAL | Age: 68
End: 2023-11-15

## 2023-11-15 ENCOUNTER — HOSPITAL ENCOUNTER (OUTPATIENT)
Facility: HOSPITAL | Age: 68
Discharge: HOME | End: 2023-11-18
Payer: MEDICARE

## 2023-11-15 ENCOUNTER — HOSPITAL ENCOUNTER (OUTPATIENT)
Facility: HOSPITAL | Age: 68
Discharge: HOME | End: 2023-11-15
Attending: SPECIALIST
Payer: MEDICARE

## 2023-11-15 VITALS
HEART RATE: 96 BPM | OXYGEN SATURATION: 98 % | DIASTOLIC BLOOD PRESSURE: 61 MMHG | TEMPERATURE: 97.8 F | RESPIRATION RATE: 18 BRPM | SYSTOLIC BLOOD PRESSURE: 119 MMHG

## 2023-11-15 DIAGNOSIS — Z01.811 PRE-OP CHEST EXAM: ICD-10-CM

## 2023-11-15 DIAGNOSIS — Z01.818 PRE-OPERATIVE CLEARANCE: Primary | ICD-10-CM

## 2023-11-15 DIAGNOSIS — S91.301D OPEN WOUND OF RIGHT FOOT, SUBSEQUENT ENCOUNTER: ICD-10-CM

## 2023-11-15 DIAGNOSIS — Z01.811 PRE-OP CHEST EXAM: Primary | ICD-10-CM

## 2023-11-15 DIAGNOSIS — Z01.818 PRE-OPERATIVE CLEARANCE: ICD-10-CM

## 2023-11-15 DIAGNOSIS — L97.822 NON-PRESSURE CHRONIC ULCER OF OTHER PART OF LEFT LOWER LEG WITH FAT LAYER EXPOSED (HCC): Primary | ICD-10-CM

## 2023-11-15 PROCEDURE — 11042 DBRDMT SUBQ TIS 1ST 20SQCM/<: CPT

## 2023-11-15 PROCEDURE — 93005 ELECTROCARDIOGRAM TRACING: CPT

## 2023-11-15 RX ORDER — LIDOCAINE HYDROCHLORIDE 20 MG/ML
JELLY TOPICAL ONCE
OUTPATIENT
Start: 2023-11-15 | End: 2023-11-15

## 2023-11-15 RX ORDER — SODIUM CHLOR/HYPOCHLOROUS ACID 0.033 %
SOLUTION, IRRIGATION IRRIGATION ONCE
OUTPATIENT
Start: 2023-11-15 | End: 2023-11-15

## 2023-11-15 RX ORDER — TRIAMCINOLONE ACETONIDE 1 MG/G
OINTMENT TOPICAL ONCE
OUTPATIENT
Start: 2023-11-15 | End: 2023-11-15

## 2023-11-15 ASSESSMENT — PAIN DESCRIPTION - DESCRIPTORS: DESCRIPTORS: ACHING

## 2023-11-15 ASSESSMENT — PAIN DESCRIPTION - ORIENTATION: ORIENTATION: LEFT

## 2023-11-15 ASSESSMENT — PAIN SCALES - GENERAL: PAINLEVEL_OUTOF10: 7

## 2023-11-15 ASSESSMENT — PAIN DESCRIPTION - LOCATION: LOCATION: LEG

## 2023-11-15 NOTE — WOUND CARE
108 (90 Base) MCG/ACT inhaler Inhale 1 puff into the lungs every 4 hours as needed      ascorbic acid (VITAMIN C) 500 MG tablet Take 2 tablets by mouth 2 times daily      aspirin 81 MG chewable tablet Take 1 tablet by mouth daily      atorvastatin (LIPITOR) 40 MG tablet Take 1 tablet by mouth nightly at bedtime. B Complex Vitamins (VITAMIN B COMPLEX) TABS Take 1 tablet by mouth daily      colchicine (COLCRYS) 0.6 MG tablet Take 1 tablet by mouth daily      flunisolide (NASALIDE) 25 MCG/ACT (0.025%) SOLN 2 sprays 2 times daily      fluticasone-umeclidin-vilant (TRELEGY ELLIPTA) 100-62.5-25 MCG/ACT AEPB inhaler INHALE 1 INHALATION BY MOUTH ONCE DAILY      gabapentin (NEURONTIN) 400 MG capsule Take 1 capsule by mouth 4 times daily. Insulin Glargine, 2 Unit Dial, (TOUJEO MAX SOLOSTAR) 300 UNIT/ML SOPN 90unit am , 32 at bedtime      MM PEN NEEDLES 31G X 8 MM MISC USE 1 NEW PEN NEEDLE TO INJECT INSULIN SUBCUTANEOUSLY FIVE TIMES DAILY      vitamin E 400 UNIT capsule Take 2 capsules by mouth 2 times daily       No current facility-administered medications on file prior to encounter. REVIEW OF SYSTEMS  A comprehensive review of systems was negative except for what has been indicated above and: None    ASSESSMENT:  Progress in healing left leg wound    PLAN:  Continue Adaptic plus silver cell plus Coflex lite  Surgery with Dr. Kelly Garcia on the right foot in 2 days      Written patient dismissal instructions given to patient and signed by patient or POA. Electronically signed by Antolin Breen MD on 11/15/2023 at 3:02 PM   Procedure Note  Indications: Based on my examination of this patient's wound(s)/ulcer(s) today, debridement is required to promote healing and evaluate the wound base. Debridement: Excisional Debridement    Using: curette the wound(s)/ulcer(s) was/were debrided down through and including the removal of subcutaneous tissue.   Performed by: Antolin Breen MD  Consent

## 2023-11-15 NOTE — DISCHARGE INSTRUCTIONS
Wound Clinic Physician Orders and Discharge Instructions  [address] [place] [address] S. 709 [facility], [address] [place] [address] Way  Telephone: [phone]    NAME:  Tolu Lal OF BIRTH:  1955  MEDICAL RECORD NUMBER:  [id number]  DATE:  11/15/2023      Return Appointment:  [] Dressing Supply Provider:   [x] Home Healthcare: Chaz Driscoll  [x] Return Appointment: 1 Week(s)  [] Nurse Visit:     [] Discharge from [facility]: [] Healed        [] Refer to Provider:         [] Consult    Follow-up Information:  [] Ordered Tests:  [] Rx:   [x] Other: procedure w/ Dr. Simón Bae next week 11/17     Wound Cleansing:   Do not scrub or use excessive force. Cleanse wound prior to applying a clean dressing with:  [x] Normal Saline OR   [] Keep Wound Dry in Shower     [] Wound Cleanser   [x] Cleanse wound with Mild Soap & Water    [] Other:       Topical Treatments:  Do not apply lotions, creams, or ointments to wound bed unless directed. [] Apply moisturizing lotion to skin surrounding the wound prior to dressing change.  [] Apply antifungal ointment to skin surrounding the wound prior to dressing change.  [] Apply thin film of moisture barrier ointment to skin immediately around wound.   [] Apply Betadine to skin immediately around wound   [] Other:      Dressings:           Wound Location: Left leg     [x] Apply Primary Dressing:       [] MediHoney Gel [] MediHoney Alginate  [x] Calcium Alginate with Silver - SILVERCEL 2ND [] Calcium Alginate without silver   [] Collagen with silver [] Collagen without Silver    [] Santyl with moist saline gauze     [] Hydrofera Blue (cut to size and moistened with normal saline)  [] Hydrofera Blue Ready (cut to size)      [] Normal Saline wet to dry  [] Betadine wet to dry    [] Hydrogel  [] Mepitel     [] Bactroban/Mupirocin   [] Iodoform Packing Strip [] Plain Packing Strip   [] Skin Sub:   [x] Other: ADAPTIC - 1ST     [x] Cover and Secure

## 2023-11-16 ENCOUNTER — TELEPHONE (OUTPATIENT)
Age: 68
End: 2023-11-16

## 2023-11-16 LAB
EKG ATRIAL RATE: 98 BPM
EKG DIAGNOSIS: NORMAL
EKG P AXIS: 85 DEGREES
EKG P-R INTERVAL: 156 MS
EKG Q-T INTERVAL: 382 MS
EKG QRS DURATION: 106 MS
EKG QTC CALCULATION (BAZETT): 487 MS
EKG R AXIS: 129 DEGREES
EKG T AXIS: -10 DEGREES
EKG VENTRICULAR RATE: 98 BPM

## 2023-11-16 NOTE — TELEPHONE ENCOUNTER
Received call from Dr. Joaquina Callejas stating patient will need cardiac clearance and we would need to cancel him for tomorrow. I called patient to let him know that based on his presurgical clearance he would need to get a cardiac clearance to ensure that he is stable and safe to have surgery. Patient states he's had the same issues with his heart for years and his cardiologist is aware of this. I told patient we would have to cancel his surgery tomorrow until we obtain a clearance from his cardiologist first. Patient states he is not scheduled to see his cardiologist for another month or so. I asked patient if I can forward the preoperative clearance to them so we can have this for his surgery when we reschedule. Patient states to \"forget it, I will just go to another physician at [address] [place] [address]" and hung up on me. Forwarding to Dr. Joaquina Callejas to review and sign off.

## 2023-11-22 ENCOUNTER — HOSPITAL ENCOUNTER (OUTPATIENT)
Facility: HOSPITAL | Age: 68
Discharge: HOME | End: 2023-11-22
Attending: SPECIALIST
Payer: MEDICARE

## 2023-11-22 ENCOUNTER — TELEPHONE (OUTPATIENT)
Age: 68
End: 2023-11-22

## 2023-11-22 VITALS
DIASTOLIC BLOOD PRESSURE: 59 MMHG | HEART RATE: 58 BPM | TEMPERATURE: 98.6 F | RESPIRATION RATE: 18 BRPM | OXYGEN SATURATION: 98 % | SYSTOLIC BLOOD PRESSURE: 144 MMHG

## 2023-11-22 DIAGNOSIS — S91.301D OPEN WOUND OF RIGHT FOOT, SUBSEQUENT ENCOUNTER: Primary | ICD-10-CM

## 2023-11-22 DIAGNOSIS — L97.822 NON-PRESSURE CHRONIC ULCER OF OTHER PART OF LEFT LOWER LEG WITH FAT LAYER EXPOSED (HCC): ICD-10-CM

## 2023-11-22 PROCEDURE — 11045 DBRDMT SUBQ TISS EACH ADDL: CPT

## 2023-11-22 PROCEDURE — 11042 DBRDMT SUBQ TIS 1ST 20SQCM/<: CPT

## 2023-11-22 RX ORDER — SODIUM CHLOR/HYPOCHLOROUS ACID 0.033 %
SOLUTION, IRRIGATION IRRIGATION ONCE
OUTPATIENT
Start: 2023-11-22 | End: 2023-11-22

## 2023-11-22 RX ORDER — LIDOCAINE HYDROCHLORIDE 20 MG/ML
JELLY TOPICAL ONCE
OUTPATIENT
Start: 2023-11-22 | End: 2023-11-22

## 2023-11-22 RX ORDER — TRIAMCINOLONE ACETONIDE 1 MG/G
OINTMENT TOPICAL ONCE
OUTPATIENT
Start: 2023-11-22 | End: 2023-11-22

## 2023-11-22 ASSESSMENT — PAIN SCALES - GENERAL: PAINLEVEL_OUTOF10: 6

## 2023-11-22 NOTE — DISCHARGE INSTRUCTIONS
Wound Clinic Physician Orders and Discharge Instructions  [address] [place] [address] S. [address]  Telephone: [phone]    NAME:  Jayson Jordan OF BIRTH:  1955  MEDICAL RECORD NUMBER:  [id number]  DATE:  11/22/2023      Return Appointment:  [] Dressing Supply Provider:   [x] Home Healthcare: Lillian Nick  [x] Return Appointment: 1 Week(s)  [] Nurse Visit:     [] Discharge from [facility]: [] Healed        [] Refer to Provider:         [] Consult    Follow-up Information:  [] Ordered Tests:  [] Rx:   [x] Other: procedure w/ Dr. Joaquina Callejas next week for ? Surgery on right foot     Wound Cleansing:   Do not scrub or use excessive force. Cleanse wound prior to applying a clean dressing with:  [x] Normal Saline OR   [] Keep Wound Dry in Shower     [] Wound Cleanser   [x] Cleanse wound with Mild Soap & Water    [] Other:       Topical Treatments:  Do not apply lotions, creams, or ointments to wound bed unless directed. [] Apply moisturizing lotion to skin surrounding the wound prior to dressing change.  [] Apply antifungal ointment to skin surrounding the wound prior to dressing change.  [] Apply thin film of moisture barrier ointment to skin immediately around wound.   [] Apply Betadine to skin immediately around wound   [] Other:      Dressings:           Wound Location: Left leg     [x] Apply Primary Dressing:       [] MediHoney Gel [] MediHoney Alginate  [x] Calcium Alginate with Silver - SILVERCEL 2ND [] Calcium Alginate without silver   [] Collagen with silver [] Collagen without Silver    [] Santyl with moist saline gauze     [] Hydrofera Blue (cut to size and moistened with normal saline)  [] Hydrofera Blue Ready (cut to size)      [] Normal Saline wet to dry  [] Betadine wet to dry    [] Hydrogel  [] Mepitel     [] Bactroban/Mupirocin   [] Iodoform Packing Strip [] Plain Packing Strip   [] Skin Sub:   [x] Other: ADAPTIC - 1ST     [x]

## 2023-11-22 NOTE — TELEPHONE ENCOUNTER
PT called and said he has been cleared by his heart doctor to have surgery    He will awaiting a call from [place] to schedule

## 2023-11-22 NOTE — WOUND CARE
Wound Clinic Physician Orders and Discharge Instructions  [address] [place] [address] S. [address]  Telephone: [phone]    NAME:  Kalina Martinez OF BIRTH:  1955  MEDICAL RECORD NUMBER:  [id number]  DATE:  11/22/2023      Return Appointment:  [] Dressing Supply Provider:   [x] Home Healthcare: Tereso Flores  [x] Return Appointment: 1 Week(s)  [] Nurse Visit:     [] Discharge from [facility] [] Healed        [] Refer to Provider:         [] Consult    Follow-up Information:  [] Ordered Tests:  [] Rx:   [x] Other: procedure w/ Dr. Lucía Lorenzo next week for ? Surgery on right foot     Wound Cleansing:   Do not scrub or use excessive force. Cleanse wound prior to applying a clean dressing with:  [x] Normal Saline OR   [] Keep Wound Dry in Shower     [] Wound Cleanser   [x] Cleanse wound with Mild Soap & Water    [] Other:       Topical Treatments:  Do not apply lotions, creams, or ointments to wound bed unless directed. [] Apply moisturizing lotion to skin surrounding the wound prior to dressing change.  [] Apply antifungal ointment to skin surrounding the wound prior to dressing change.  [] Apply thin film of moisture barrier ointment to skin immediately around wound.   [] Apply Betadine to skin immediately around wound   [] Other:      Dressings:           Wound Location: Left leg     [x] Apply Primary Dressing:       [] MediHoney Gel [] MediHoney Alginate  [x] Calcium Alginate with Silver - SILVERCEL 2ND [] Calcium Alginate without silver   [] Collagen with silver [] Collagen without Silver    [] Santyl with moist saline gauze     [] Hydrofera Blue (cut to size and moistened with normal saline)  [] Hydrofera Blue Ready (cut to size)      [] Normal Saline wet to dry  [] Betadine wet to dry    [] Hydrogel  [] Mepitel     [] Bactroban/Mupirocin   [] Iodoform Packing Strip [] Plain Packing Strip   [] Skin Sub:   [x] Other: ADAPTIC - 1ST     [x]
Father     Hypertension Father     Diabetes Sister     Diabetes Brother     Hypertension Mother     Heart Disease Mother     Kidney Disease Mother        SOCIAL HISTORY    Social History     Tobacco Use    Smoking status: Never    Smokeless tobacco: Never   Substance Use Topics    Alcohol use: Not Currently    Drug use: Never       ALLERGIES    Allergies   Allergen Reactions    Amitriptyline Angioedema    Naproxen      Other reaction(s): Unknown (comments)  Pt not sure of reaction    Sulfa Antibiotics Nausea And Vomiting       MEDICATIONS    Current Outpatient Medications on File Prior to Encounter   Medication Sig Dispense Refill    allopurinol (ZYLOPRIM) 100 MG tablet Take 2 tablets by mouth daily 60 tablet 5    miconazole (ZEASORB-AF) 2 % powder Apply topically to affected areas daily. 45 g 1    ammonium lactate (LAC-HYDRIN) 12 % lotion APPLY TO THE AFFECTED AREA(S) TOPICALLY 3 TIMES DAILY      betamethasone dipropionate 0.05 % cream       HUMALOG KWIKPEN 100 UNIT/ML SOPN INJECT INSULIN SUBCUTANEOUSLY WITH EACH MEAL. AFTER TESTING IF GLUCOSE -199=2 UNITS, 200-249=4 UNITS, 250-299=6 UNITS, 300-349=8 UNITS, 350-399=10 UNITS, IF OVER 400 CALL DOCTOR      predniSONE (DELTASONE) 20 MG tablet Take 1 tablet by mouth 2 times daily      oxyCODONE (OXYCONTIN) 10 MG extended release tablet Take 1 tablet by mouth 4 times daily.       Lactobacillus Acid-Pectin (ACIDOPHILUS/CITRUS PECTIN) TABS Take 1 tablet by mouth 2 times daily (Patient not taking: Reported on 11/8/2023) 30 tablet 0    bumetanide (BUMEX) 2 MG tablet Take 1 tablet by mouth 2 times daily      ENTRESTO 24-26 MG per tablet Take 1 tablet by mouth 2 times daily      potassium chloride (MICRO-K) 10 MEQ extended release capsule Take 1 capsule by mouth 4 times daily      ipratropium 0.5 mg-albuterol 2.5 mg (DUONEB) 0.5-2.5 (3) MG/3ML SOLN nebulizer solution Inhale 3 mLs into the lungs every 6 hours while awake 360 mL 1    albuterol sulfate HFA

## 2023-11-28 NOTE — TELEPHONE ENCOUNTER
Patient scheduled for surgery 12-08-23 @ 9:00am.    Patient notified of surgery date/time. Patient verbalized understanding that surgery will call him the day prior to give him instructions about his surgery. Surgery packet faxed into chart.

## 2023-11-29 ENCOUNTER — HOSPITAL ENCOUNTER (OUTPATIENT)
Facility: HOSPITAL | Age: 68
Discharge: HOME | End: 2023-11-29
Attending: SPECIALIST
Payer: MEDICARE

## 2023-11-29 VITALS
DIASTOLIC BLOOD PRESSURE: 85 MMHG | RESPIRATION RATE: 20 BRPM | TEMPERATURE: 97.9 F | HEART RATE: 99 BPM | SYSTOLIC BLOOD PRESSURE: 147 MMHG

## 2023-11-29 DIAGNOSIS — S91.301D OPEN WOUND OF RIGHT FOOT, SUBSEQUENT ENCOUNTER: Primary | ICD-10-CM

## 2023-11-29 DIAGNOSIS — L97.822 NON-PRESSURE CHRONIC ULCER OF OTHER PART OF LEFT LOWER LEG WITH FAT LAYER EXPOSED (HCC): ICD-10-CM

## 2023-11-29 PROCEDURE — 29581 APPL MULTLAYER CMPRN SYS LEG: CPT

## 2023-11-29 RX ORDER — LIDOCAINE HYDROCHLORIDE 20 MG/ML
JELLY TOPICAL ONCE
OUTPATIENT
Start: 2023-11-29 | End: 2023-11-29

## 2023-11-29 RX ORDER — SODIUM CHLOR/HYPOCHLOROUS ACID 0.033 %
SOLUTION, IRRIGATION IRRIGATION ONCE
OUTPATIENT
Start: 2023-11-29 | End: 2023-11-29

## 2023-11-29 RX ORDER — TRIAMCINOLONE ACETONIDE 1 MG/G
OINTMENT TOPICAL ONCE
OUTPATIENT
Start: 2023-11-29 | End: 2023-11-29

## 2023-11-29 ASSESSMENT — PAIN SCALES - GENERAL: PAINLEVEL_OUTOF10: 7

## 2023-11-29 NOTE — WOUND CARE
Multilayer Compression Wrap   (Not Unna) Below the Knee    NAME:  Valeria Rossi OF BIRTH:  1955  MEDICAL RECORD NUMBER:  [id number]  DATE:  11/29/2023    Multilayer compression wrap: Removed old Multilayer wrap if indicated and wash leg with mild soap/water. Applied primary and secondary dressing as ordered. Applied multilayered dressing below the knee to left lower leg. Instructed patient/caregiver not to remove dressing and to keep it clean and dry. Instructed patient/caregiver on complications to report to provider, such as pain, numbness in toes, heavy drainage, and slippage of dressing. Instructed patient on purpose of compression dressing and on activity and exercise recommendations.       Electronically signed by Sandhya Cameron RN on 11/29/2023 at 2:40 PM

## 2023-11-29 NOTE — DISCHARGE INSTRUCTIONS
Wound Clinic Physician Orders and Discharge Instructions  [address] [place] [address] S. 709 [facility], [address] [place] [address] Way  Telephone: [phone]    NAME:  Allen Leventhal OF BIRTH:  1955  MEDICAL RECORD NUMBER:  [id number]  DATE:  11/29/2023      Return Appointment:  [] Dressing Supply Provider:   [x] Home Healthcare: Dakota Miner  [x] Return Appointment: 1 Week(s)  [] Nurse Visit:     [] Discharge from [facility]: [] Healed        [] Refer to Provider:         [] Consult    Follow-up Information:  [] Ordered Tests:  [] Rx:   [x] Other: procedure w/ Dr. Arthur Ornelas next week for ? Surgery on right foot     Wound Cleansing:   Do not scrub or use excessive force. Cleanse wound prior to applying a clean dressing with:  [x] Normal Saline OR   [] Keep Wound Dry in Shower     [] Wound Cleanser   [x] Cleanse wound with Mild Soap & Water    [] Other:       Topical Treatments:  Do not apply lotions, creams, or ointments to wound bed unless directed. [] Apply moisturizing lotion to skin surrounding the wound prior to dressing change.  [] Apply antifungal ointment to skin surrounding the wound prior to dressing change.  [] Apply thin film of moisture barrier ointment to skin immediately around wound.   [] Apply Betadine to skin immediately around wound   [] Other:      Dressings:           Wound Location: Left leg     [x] Apply Primary Dressing:       [] MediHoney Gel [] MediHoney Alginate  [x] Calcium Alginate with Silver - SILVERCEL 2ND [] Calcium Alginate without silver   [] Collagen with silver [] Collagen without Silver    [] Santyl with moist saline gauze     [] Hydrofera Blue (cut to size and moistened with normal saline)  [] Hydrofera Blue Ready (cut to size)      [] Normal Saline wet to dry  [] Betadine wet to dry    [] Hydrogel  [] Mepitel     [] Bactroban/Mupirocin   [] Iodoform Packing Strip [] Plain Packing Strip   [] Skin Sub:   [x] Other: ADAPTIC - 1ST     [x]

## 2023-11-29 NOTE — WOUND CARE
Wound Clinic Physician Orders and Discharge Instructions  [address] [place] [address] S. 709 [facility], [address] [place] [address] Way  Telephone: [phone]    NAME:  Kalina Shelby OF BIRTH:  1955  MEDICAL RECORD NUMBER:  [id number]  DATE:  11/29/2023      Return Appointment:  [] Dressing Supply Provider:   [x] Home Healthcare: Tereso Flores  [x] Return Appointment: 1 Week(s)  [] Nurse Visit:     [] Discharge from [facility]: [] Healed        [] Refer to Provider:         [] Consult    Follow-up Information:  [] Ordered Tests:  [] Rx:   [x] Other: procedure w/ Dr. Lucía Lorenzo next week for ? Surgery on right foot     Wound Cleansing:   Do not scrub or use excessive force. Cleanse wound prior to applying a clean dressing with:  [x] Normal Saline OR   [] Keep Wound Dry in Shower     [] Wound Cleanser   [x] Cleanse wound with Mild Soap & Water    [] Other:       Topical Treatments:  Do not apply lotions, creams, or ointments to wound bed unless directed. [] Apply moisturizing lotion to skin surrounding the wound prior to dressing change.  [] Apply antifungal ointment to skin surrounding the wound prior to dressing change.  [] Apply thin film of moisture barrier ointment to skin immediately around wound.   [] Apply Betadine to skin immediately around wound   [] Other:      Dressings:           Wound Location: Left leg     [x] Apply Primary Dressing:       [] MediHoney Gel [] MediHoney Alginate  [x] Calcium Alginate with Silver - SILVERCEL 2ND [] Calcium Alginate without silver   [] Collagen with silver [] Collagen without Silver    [] Santyl with moist saline gauze     [] Hydrofera Blue (cut to size and moistened with normal saline)  [] Hydrofera Blue Ready (cut to size)      [] Normal Saline wet to dry  [] Betadine wet to dry    [] Hydrogel  [] Mepitel     [] Bactroban/Mupirocin   [] Iodoform Packing Strip [] Plain Packing Strip   [] Skin Sub:   [x] Other: ADAPTIC - 1ST     [x]

## 2023-11-29 NOTE — WOUND CARE
[facility]   Medical Staff Progress Note      [address] RECORD NUMBER:  [id number]  AGE: 70 y.o. GENDER: male  : 1955  EPISODE DATE:  2023    Chief complaint and reason for visit:   Left leg wound  Chief Complaint   Patient presents with    Wound Check     Left leg and right foot      Patient presenting for follow up evaluation of wound(s) per chief complaint. Subjective: Symptoms, wound related issues, or other pertinent wound history since last visit: No change in history since the patient's most recent visit. He is scheduled for surgery with Dr. Wilfredo Hand 10 days, on     Wound 05/10/23 Leg Left #1 circ (Active)   Wound Image   23   Wound Etiology Venous 23   Dressing Status Clean;Dry; Intact 23   Wound Cleansed Soap and water 23   Dressing/Treatment Non adherent; Alginate with Ag;Gauze dressing/dressing sponge; Other (comment) 23 151   Wound Length (cm) 6.5 cm 23   Wound Width (cm) 3.8 cm 23   Wound Depth (cm) 0.1 cm 23   Wound Surface Area (cm^2) 24.7 cm^2 23   Change in Wound Size % (l*w) 96.64 23   Wound Volume (cm^3) 2.47 cm^3 23   Wound Healing % 98 23   Post-Procedure Length (cm) 6.5 cm 23   Post-Procedure Width (cm) 3.5 cm 23   Post-Procedure Depth (cm) 0.1 cm 23   Post-Procedure Surface Area (cm^2) 22.75 cm^2 23   Post-Procedure Volume (cm^3) 2.275 cm^3 23   Wound Assessment Slough;Granulation tissue 23   Drainage Amount Moderate (25-50%) 23   Drainage Description Serosanguinous 23   Odor None 23   Emy-wound Assessment Intact 23   Margins Attached edges 23 1358   Wound Thickness Description not for Pressure Injury Full thickness 23 1340

## 2023-11-30 ENCOUNTER — TELEPHONE (OUTPATIENT)
Age: 68
End: 2023-11-30

## 2023-11-30 NOTE — TELEPHONE ENCOUNTER
Could you please call the patient to let him know his surgery has been rescheduled to 12-15-23 @ 1:00pm because Dr. Claros More will be out of town next week? Thank you!

## 2023-12-05 ENCOUNTER — OFFICE VISIT (OUTPATIENT)
Age: 68
End: 2023-12-05
Payer: MEDICARE

## 2023-12-05 VITALS
TEMPERATURE: 97.3 F | WEIGHT: 315 LBS | HEIGHT: 71 IN | SYSTOLIC BLOOD PRESSURE: 119 MMHG | OXYGEN SATURATION: 97 % | RESPIRATION RATE: 16 BRPM | BODY MASS INDEX: 44.1 KG/M2 | HEART RATE: 82 BPM | DIASTOLIC BLOOD PRESSURE: 67 MMHG

## 2023-12-05 DIAGNOSIS — L08.9 TOE INFECTION: Primary | ICD-10-CM

## 2023-12-05 DIAGNOSIS — J44.9 CHRONIC OBSTRUCTIVE PULMONARY DISEASE, UNSPECIFIED COPD TYPE (HCC): ICD-10-CM

## 2023-12-05 DIAGNOSIS — M1A.9XX1 TOPHACEOUS GOUT OF JOINT: ICD-10-CM

## 2023-12-05 DIAGNOSIS — I83.028: ICD-10-CM

## 2023-12-05 DIAGNOSIS — L97.826: ICD-10-CM

## 2023-12-05 DIAGNOSIS — M86.9 OSTEOMYELITIS OF RIGHT FOOT, UNSPECIFIED TYPE (HCC): ICD-10-CM

## 2023-12-05 PROCEDURE — G8427 DOCREV CUR MEDS BY ELIG CLIN: HCPCS | Performed by: INTERNAL MEDICINE

## 2023-12-05 PROCEDURE — G8417 CALC BMI ABV UP PARAM F/U: HCPCS | Performed by: INTERNAL MEDICINE

## 2023-12-05 PROCEDURE — 3023F SPIROM DOC REV: CPT | Performed by: INTERNAL MEDICINE

## 2023-12-05 PROCEDURE — 1123F ACP DISCUSS/DSCN MKR DOCD: CPT | Performed by: INTERNAL MEDICINE

## 2023-12-05 PROCEDURE — 3017F COLORECTAL CA SCREEN DOC REV: CPT | Performed by: INTERNAL MEDICINE

## 2023-12-05 PROCEDURE — G8484 FLU IMMUNIZE NO ADMIN: HCPCS | Performed by: INTERNAL MEDICINE

## 2023-12-05 PROCEDURE — 1036F TOBACCO NON-USER: CPT | Performed by: INTERNAL MEDICINE

## 2023-12-05 PROCEDURE — 99213 OFFICE O/P EST LOW 20 MIN: CPT | Performed by: INTERNAL MEDICINE

## 2023-12-05 RX ORDER — DOXYCYCLINE HYCLATE 100 MG
100 TABLET ORAL 2 TIMES DAILY
Qty: 20 TABLET | Refills: 0 | Status: SHIPPED | OUTPATIENT
Start: 2023-12-11 | End: 2023-12-21

## 2023-12-05 ASSESSMENT — ENCOUNTER SYMPTOMS
GASTROINTESTINAL NEGATIVE: 1
SHORTNESS OF BREATH: 1

## 2023-12-05 NOTE — PROGRESS NOTES
HPI: Pt presents for routine follow up of venous stasis ulcer involving left leg and right great toe cellulitis/gouty arthritis. He was seen by Podiatry after his last visit w me and is reportedly scheduled for right great toe debridement next wk pending cardiac clearance. X-ray of right foot in 10/2023 revealed soft tissue swelling, osteomyelitis could not be ruled out. Drainage Cx from his right great toe 11/07 was neg, MRSA  was isolated on 10/11 treated w a 2 wk course of Doxycycline. He denies acute complaints on assessment today. ROS  Review of Systems   Constitutional: Negative. Respiratory:  Positive for shortness of breath. Gastrointestinal: Negative. Genitourinary: Negative. Musculoskeletal:         Right foot swelling    Skin: Negative. Past Medical History:   Diagnosis Date    Arthritis     CAD (coronary artery disease)     Chronic obstructive pulmonary disease (HCC)     Diabetes ([address])     Gout     Hypertension     Ill-defined condition     Sleep apnea     cpap        Past Surgical History:   Procedure Laterality Date    FOOT SURGERY      right heel - foriegn object    HX VEIN ABLATION ADHESIVE      ORTHOPEDIC SURGERY      back surgery    PACEMAKER      aicd -        Social History     Tobacco Use    Smoking status: Never    Smokeless tobacco: Never   Substance Use Topics    Alcohol use: Not Currently    Drug use: Never        Family History   Problem Relation Age of Onset    Diabetes Mother     Heart Disease Father     Hypertension Father     Diabetes Sister     Diabetes Brother     Hypertension Mother     Heart Disease Mother     Kidney Disease Mother         Allergies   Allergen Reactions    Amitriptyline Angioedema    Naproxen      Other reaction(s): Unknown (comments)  Pt not sure of reaction    Sulfa Antibiotics Nausea And Vomiting        Objective  Physical Exam  Constitutional:       Appearance: Normal appearance. Cardiovascular:      Pulses: Normal pulses.       Heart

## 2023-12-06 ENCOUNTER — HOSPITAL ENCOUNTER (OUTPATIENT)
Facility: HOSPITAL | Age: 68
Discharge: HOME | End: 2023-12-06
Attending: SPECIALIST
Payer: MEDICARE

## 2023-12-06 VITALS
HEART RATE: 102 BPM | TEMPERATURE: 97.6 F | RESPIRATION RATE: 20 BRPM | DIASTOLIC BLOOD PRESSURE: 73 MMHG | SYSTOLIC BLOOD PRESSURE: 142 MMHG

## 2023-12-06 DIAGNOSIS — L97.822 NON-PRESSURE CHRONIC ULCER OF OTHER PART OF LEFT LOWER LEG WITH FAT LAYER EXPOSED (HCC): Primary | ICD-10-CM

## 2023-12-06 DIAGNOSIS — S91.301D OPEN WOUND OF RIGHT FOOT, SUBSEQUENT ENCOUNTER: ICD-10-CM

## 2023-12-06 PROCEDURE — 11045 DBRDMT SUBQ TISS EACH ADDL: CPT

## 2023-12-06 PROCEDURE — 11042 DBRDMT SUBQ TIS 1ST 20SQCM/<: CPT

## 2023-12-06 RX ORDER — TRIAMCINOLONE ACETONIDE 1 MG/G
OINTMENT TOPICAL ONCE
OUTPATIENT
Start: 2023-12-06 | End: 2023-12-06

## 2023-12-06 RX ORDER — SODIUM CHLOR/HYPOCHLOROUS ACID 0.033 %
SOLUTION, IRRIGATION IRRIGATION ONCE
OUTPATIENT
Start: 2023-12-06 | End: 2023-12-06

## 2023-12-06 RX ORDER — LIDOCAINE HYDROCHLORIDE 20 MG/ML
JELLY TOPICAL ONCE
OUTPATIENT
Start: 2023-12-06 | End: 2023-12-06

## 2023-12-06 ASSESSMENT — PAIN DESCRIPTION - DESCRIPTORS: DESCRIPTORS: ACHING

## 2023-12-06 ASSESSMENT — PAIN DESCRIPTION - LOCATION: LOCATION: BACK;LEG

## 2023-12-06 ASSESSMENT — PAIN SCALES - GENERAL: PAINLEVEL_OUTOF10: 7

## 2023-12-06 ASSESSMENT — PAIN DESCRIPTION - ORIENTATION: ORIENTATION: LEFT

## 2023-12-06 NOTE — WOUND CARE
Multilayer Compression Wrap   (Not Unna) Below the Knee    NAME:  Ezequiel Sandy OF BIRTH:  1955  MEDICAL RECORD NUMBER:  [id number]  DATE:  12/6/2023    Multilayer compression wrap: Removed old Multilayer wrap if indicated and wash leg with mild soap/water. Applied moisturizing agent to dry skin as needed. Applied primary and secondary dressing as ordered. Applied multilayered dressing below the knee to left lower leg. Instructed patient/caregiver not to remove dressing and to keep it clean and dry. Instructed patient/caregiver on complications to report to provider, such as pain, numbness in toes, heavy drainage, and slippage of dressing. Instructed patient on purpose of compression dressing and on activity and exercise recommendations.       Electronically signed by Romayne Grain, LPN on 58/5/3071 at 5:14 PM
Wound Clinic Physician Orders and Discharge Instructions  [address] [place] [address] S. 709 [facility], [address] [place] [address] Way  Telephone: [phone]    NAME:  Lexie Asher OF BIRTH:  1955  MEDICAL RECORD NUMBER:  [id number]  DATE:  12/6/2023      Return Appointment:  [] Dressing Supply Provider:   [x] Home Healthcare: Kelli Earl  [x] Return Appointment: 1 Week(s)  [] Nurse Visit:     [] Discharge from [facility]: [] Healed        [] Refer to Provider:         [] Consult    Follow-up Information:  [] Ordered Tests:  [] Rx:   [x] Other: procedure w/ Dr. Leslie James next week for  Surgery on right foot     Wound Cleansing:   Do not scrub or use excessive force. Cleanse wound prior to applying a clean dressing with:  [x] Normal Saline OR   [] Keep Wound Dry in Shower     [] Wound Cleanser   [x] Cleanse wound with Mild Soap & Water    [] Other:       Topical Treatments:  Do not apply lotions, creams, or ointments to wound bed unless directed. [] Apply moisturizing lotion to skin surrounding the wound prior to dressing change.  [] Apply antifungal ointment to skin surrounding the wound prior to dressing change.  [] Apply thin film of moisture barrier ointment to skin immediately around wound.   [] Apply Betadine to skin immediately around wound   [] Other:      Dressings:           Wound Location: Left leg     [x] Apply Primary Dressing:       [] MediHoney Gel [] MediHoney Alginate  [x] Calcium Alginate with Silver - SILVERCEL 2ND [] Calcium Alginate without silver   [] Collagen with silver [] Collagen without Silver    [] Santyl with moist saline gauze     [] Hydrofera Blue (cut to size and moistened with normal saline)  [] Hydrofera Blue Ready (cut to size)      [] Normal Saline wet to dry  [] Betadine wet to dry    [] Hydrogel  [] Mepitel     [] Bactroban/Mupirocin   [] Iodoform Packing Strip [] Plain Packing Strip   [] Skin Sub:   [x] Other: ADAPTIC - 1ST     [x]
Dressing/Treatment Betadine swabs/povidone iodine 11/08/23 1506   Offloading for Diabetic Foot Ulcers Offloading not ordered 11/29/23 1400   Wound Length (cm) 2.5 cm 12/06/23 1409   Wound Width (cm) 3 cm 12/06/23 1409   Wound Depth (cm) 0.1 cm 12/06/23 1409   Wound Surface Area (cm^2) 7.5 cm^2 12/06/23 1409   Change in Wound Size % (l*w) -4587.5 12/06/23 1409   Wound Volume (cm^3) 0.75 cm^3 12/06/23 1409   Wound Healing % -1072 12/06/23 1409   Undermining Starts ___ O'Clock 12 11/01/23 1444   Undermining Ends___ O'Clock 12 11/01/23 1444   Undermining Maxium Distance (cm) 0.6 11/01/23 1444   Wound Assessment Dry;Fluid filled blister 12/06/23 1409   Drainage Amount None (dry) 12/06/23 1409   Drainage Description Serosanguinous 11/29/23 1400   Odor None 12/06/23 1409   Emy-wound Assessment Dry/flaky 12/06/23 1409   Margins Undefined edges 12/06/23 1409   Number of days: 56        Total Surface Area Debrided:  7.5 sq cm   Estimated Blood Loss: Minimal amount blood loss . Hemostasis Achieved:  by pressure  Procedural Pain: 2  / 10   Post Procedural Pain: 1 / 10   Response to treatment: Patient tolerated procedure well with no complaints of pain.

## 2023-12-06 NOTE — DISCHARGE INSTRUCTIONS
To[facility] Mayfield  [] Dr. Debbie Grant      Nurse Case Manger:  Theodora Leary Information: Should you experience any significant changes in your wound(s) or have questions about your wound care, please contact the [facility] at [phone]. Our hours are Monday - Friday 8am - 4:30pm, closed all major holidays. If you need help with your wound outside these hours and cannot wait until we are again available, contact your PCP or go to the hospital emergency room. PLEASE NOTE: IF YOU ARE UNABLE TO OBTAIN WOUND SUPPLIES, CONTINUE TO USE THE SUPPLIES YOU HAVE AVAILABLE UNTIL YOU ARE ABLE TO REACH US. IT IS MOST IMPORTANT TO KEEP THE WOUND COVERED AT ALL TIMES.

## 2023-12-12 RX ORDER — METOLAZONE 5 MG/1
5 TABLET ORAL DAILY
COMMUNITY

## 2023-12-13 ENCOUNTER — HOSPITAL ENCOUNTER (OUTPATIENT)
Facility: HOSPITAL | Age: 68
Discharge: HOME | End: 2023-12-13
Attending: SPECIALIST
Payer: MEDICARE

## 2023-12-13 VITALS
SYSTOLIC BLOOD PRESSURE: 126 MMHG | RESPIRATION RATE: 20 BRPM | HEART RATE: 102 BPM | OXYGEN SATURATION: 99 % | TEMPERATURE: 96 F | DIASTOLIC BLOOD PRESSURE: 72 MMHG

## 2023-12-13 DIAGNOSIS — L97.822 NON-PRESSURE CHRONIC ULCER OF OTHER PART OF LEFT LOWER LEG WITH FAT LAYER EXPOSED (HCC): Primary | ICD-10-CM

## 2023-12-13 DIAGNOSIS — S91.301D OPEN WOUND OF RIGHT FOOT, SUBSEQUENT ENCOUNTER: ICD-10-CM

## 2023-12-13 PROCEDURE — 11045 DBRDMT SUBQ TISS EACH ADDL: CPT

## 2023-12-13 PROCEDURE — 11042 DBRDMT SUBQ TIS 1ST 20SQCM/<: CPT

## 2023-12-13 RX ORDER — GABAPENTIN 300 MG/1
300 CAPSULE ORAL 4 TIMES DAILY
COMMUNITY

## 2023-12-13 RX ORDER — LIDOCAINE HYDROCHLORIDE 20 MG/ML
JELLY TOPICAL ONCE
OUTPATIENT
Start: 2023-12-13 | End: 2023-12-13

## 2023-12-13 RX ORDER — TRIAMCINOLONE ACETONIDE 1 MG/G
OINTMENT TOPICAL ONCE
OUTPATIENT
Start: 2023-12-13 | End: 2023-12-13

## 2023-12-13 RX ORDER — OXYCODONE HYDROCHLORIDE 5 MG/1
5 TABLET ORAL 2 TIMES DAILY
COMMUNITY

## 2023-12-13 RX ORDER — SODIUM CHLOR/HYPOCHLOROUS ACID 0.033 %
SOLUTION, IRRIGATION IRRIGATION ONCE
OUTPATIENT
Start: 2023-12-13 | End: 2023-12-13

## 2023-12-13 NOTE — DISCHARGE INSTRUCTIONS
Wound Clinic Physician Orders and Discharge Instructions  [address] [place] [address] S. 709 [facility], [address] [place] [address] Way  Telephone: [phone]    NAME:  Kitty Mojica OF BIRTH:  1955  MEDICAL RECORD NUMBER:  [id number]  DATE:  12/13/2023      Return Appointment:  [] Dressing Supply Provider:   [x] Home Healthcare: Claude Eagle  [x] Return Appointment: 1 Week(s)  [] Nurse Visit:     [] Discharge from [facility]: [] Healed        [] Refer to Provider:         [] Consult    Follow-up Information:  [] Ordered Tests:  [] Rx:   [x] Other: procedure w/ Dr. Rafal Phoenix next week for  Surgery on right foot     Wound Cleansing:   Do not scrub or use excessive force. Cleanse wound prior to applying a clean dressing with:  [x] Normal Saline OR   [] Keep Wound Dry in Shower     [] Wound Cleanser   [x] Cleanse wound with Mild Soap & Water    [] Other:       Topical Treatments:  Do not apply lotions, creams, or ointments to wound bed unless directed. [] Apply moisturizing lotion to skin surrounding the wound prior to dressing change.  [] Apply antifungal ointment to skin surrounding the wound prior to dressing change.  [] Apply thin film of moisture barrier ointment to skin immediately around wound.   [] Apply Betadine to skin immediately around wound   [] Other:      Dressings:           Wound Location: Left leg     [x] Apply Primary Dressing:       [] MediHoney Gel [] MediHoney Alginate  [x] Calcium Alginate with Silver - SILVERCEL 2ND [] Calcium Alginate without silver   [] Collagen with silver [] Collagen without Silver    [] Santyl with moist saline gauze     [] Hydrofera Blue (cut to size and moistened with normal saline)  [] Hydrofera Blue Ready (cut to size)      [] Normal Saline wet to dry  [] Betadine wet to dry    [] Hydrogel  [] Mepitel     [] Bactroban/Mupirocin   [] Iodoform Packing Strip [] Plain Packing Strip   [] Skin Sub:   [x] Other: ADAPTIC - 1ST    X

## 2023-12-13 NOTE — WOUND CARE
[facility]   Medical Staff Progress Note     [address] RECORD NUMBER:  [id number]  AGE: 70 y.o. GENDER: male  : 1955  EPISODE DATE:  2023    Chief complaint and reason for visit:   L leg wound  Chief Complaint   Patient presents with    Wound Check     BLE      Patient presenting for follow up evaluation of wound(s) per chief complaint. Subjective: Symptoms, wound related issues, or other pertinent wound history since last visit: No new problems reported. Surgery with Dr. Magalys Crowder is scheduled for this Friday    Wound 05/10/23 Leg Left #1 circ (Active)   Wound Image   23   Wound Etiology Venous 23   Dressing Status Clean;Dry; Intact 23   Wound Cleansed Soap and water 23   Dressing/Treatment Non adherent; Alginate with Ag;Gauze dressing/dressing sponge; Other (comment) 23 1511   Wound Length (cm) 7 cm 23   Wound Width (cm) 4 cm 23   Wound Depth (cm) 0.1 cm 23   Wound Surface Area (cm^2) 28 cm^2 23   Change in Wound Size % (l*w) 96.19 23   Wound Volume (cm^3) 2.8 cm^3 23   Wound Healing % 98 23   Post-Procedure Length (cm) 7 cm 23 132   Post-Procedure Width (cm) 4 cm 23   Post-Procedure Depth (cm) 0.1 cm 23   Post-Procedure Surface Area (cm^2) 28 cm^2 23 132   Post-Procedure Volume (cm^3) 2.8 cm^3 23   Wound Assessment Slough;Granulation tissue 23   Drainage Amount Moderate (25-50%) 23   Drainage Description Serosanguinous 23   Odor None 23   Emy-wound Assessment Intact 23   Margins Attached edges 23   Wound Thickness Description not for Pressure Injury Full thickness 12/13/23 1311   Number of days: 217       Wound 10/11/23 Foot Right;Medial #2 (Active)   Wound Image   23
foam to the left medial foot  [x] Cover and Secure with:     [x] Gauze [] Aris [] Kerlix   [] Foam  [x] Super Absorbant  [] ABD     [] Ace Wrap [] Other:    Limit contact of tape with skin. Compression:  Apply: [x] Multilayer Compression Wrap to []RightLeg [x]Left Leg    []  Coflex [x] Coflex Lite  [] Unna with Calamine Lite    [x] Elevate leg(s) above the level of the heart when sitting. [x] Avoid prolonged standing in one place. [x] Do not get leg(s) with wrap wet. If wrap becomes too tight, call the wound center and remove wrap from leg by unrolling each layer. [x] Change dressing: [] Daily    [] Every Other Day   [x] Twice per week HH  [] Three times per week   [] Once a week [] Do Not Change Dressing   [] Other:       Edema Control:  Apply: [] Compression Stocking:  mmHg  []Right Leg []Left Leg   [x] Tubigrip [x]Right Leg Double Layer []Left Leg Double Layer      []Right Leg Single Layer []Left Leg Single Layer      [x] Elevate leg(s) above the level of the heart when sitting. [x] Avoid prolonged standing in one place. [x] Do not get leg(s) with wrap wet. If wrap becomes too tight, call the wound center and remove wrap from leg by unrolling each layer.     Off-Loading:   [] Off-loading when: [] walking  [] in bed [] sitting  [] Total non-weight bearing  [] Right Leg  [] Left Leg   [] Assistive Device [] Walker [] Cane  [] Wheelchair  [] Crutches   [] Surgical shoe    [] Wedge Shoe  [] Foam Boot(s)  [] Knee Scooter    [] Cast Boot [] Marita Gosling     [] Diabetic Shoes    [] Offloading heel boot  [] Other:     Dietary:  [] Diet as tolerated: [x] Calorie Diabetic Diet: [x] No Added Salt:  [x] Increase Protein: [] Other:     Activity:  [x] Activity as tolerated: Elevate extremities when possible  [] Patient has no activity restrictions      [] Strict Bedrest   [] Remain off Work      [] May return to full duty work                                     [] Return to work with

## 2023-12-15 ENCOUNTER — ANESTHESIA EVENT (OUTPATIENT)
Facility: HOSPITAL | Age: 68
End: 2023-12-15
Payer: MEDICARE

## 2023-12-15 ENCOUNTER — HOSPITAL ENCOUNTER (OUTPATIENT)
Facility: HOSPITAL | Age: 68
Setting detail: OUTPATIENT SURGERY
Discharge: HOME | End: 2023-12-15
Attending: PODIATRIST | Admitting: INTERNAL MEDICINE
Payer: MEDICARE

## 2023-12-15 ENCOUNTER — ANESTHESIA (OUTPATIENT)
Facility: HOSPITAL | Age: 68
End: 2023-12-15
Payer: MEDICARE

## 2023-12-15 VITALS
RESPIRATION RATE: 17 BRPM | OXYGEN SATURATION: 96 % | SYSTOLIC BLOOD PRESSURE: 116 MMHG | TEMPERATURE: 98.1 F | HEART RATE: 86 BPM | DIASTOLIC BLOOD PRESSURE: 84 MMHG

## 2023-12-15 DIAGNOSIS — M10.9 GOUT, UNSPECIFIED CAUSE, UNSPECIFIED CHRONICITY, UNSPECIFIED SITE: ICD-10-CM

## 2023-12-15 DIAGNOSIS — M1A.9XX1 CHRONIC GOUT INVOLVING TOE OF RIGHT FOOT WITH TOPHUS, UNSPECIFIED CAUSE: Primary | ICD-10-CM

## 2023-12-15 LAB
GLUCOSE BLD STRIP.AUTO-MCNC: 182 MG/DL (ref 65–100)
PERFORMED BY:: ABNORMAL

## 2023-12-15 PROCEDURE — 2580000003 HC RX 258: Performed by: PODIATRIST

## 2023-12-15 PROCEDURE — 6360000002 HC RX W HCPCS: Performed by: PODIATRIST

## 2023-12-15 PROCEDURE — 6360000002 HC RX W HCPCS: Performed by: ANESTHESIOLOGY

## 2023-12-15 PROCEDURE — 82962 GLUCOSE BLOOD TEST: CPT

## 2023-12-15 PROCEDURE — 6370000000 HC RX 637 (ALT 250 FOR IP): Performed by: ANESTHESIOLOGY

## 2023-12-15 PROCEDURE — 2500000003 HC RX 250 WO HCPCS: Performed by: PODIATRIST

## 2023-12-15 PROCEDURE — 87070 CULTURE OTHR SPECIMN AEROBIC: CPT

## 2023-12-15 PROCEDURE — 6360000002 HC RX W HCPCS: Performed by: NURSE ANESTHETIST, CERTIFIED REGISTERED

## 2023-12-15 PROCEDURE — 87205 SMEAR GRAM STAIN: CPT

## 2023-12-15 PROCEDURE — 2500000003 HC RX 250 WO HCPCS: Performed by: NURSE ANESTHETIST, CERTIFIED REGISTERED

## 2023-12-15 RX ORDER — SODIUM CHLORIDE 0.9 % (FLUSH) 0.9 %
5-40 SYRINGE (ML) INJECTION EVERY 12 HOURS SCHEDULED
Status: DISCONTINUED | OUTPATIENT
Start: 2023-12-15 | End: 2023-12-15 | Stop reason: HOSPADM

## 2023-12-15 RX ORDER — HYDROMORPHONE HYDROCHLORIDE 1 MG/ML
0.5 INJECTION, SOLUTION INTRAMUSCULAR; INTRAVENOUS; SUBCUTANEOUS EVERY 5 MIN PRN
Status: DISCONTINUED | OUTPATIENT
Start: 2023-12-15 | End: 2023-12-15 | Stop reason: HOSPADM

## 2023-12-15 RX ORDER — HYDRALAZINE HYDROCHLORIDE 20 MG/ML
10 INJECTION INTRAMUSCULAR; INTRAVENOUS
Status: DISCONTINUED | OUTPATIENT
Start: 2023-12-15 | End: 2023-12-15 | Stop reason: HOSPADM

## 2023-12-15 RX ORDER — DEXTROSE MONOHYDRATE 100 MG/ML
INJECTION, SOLUTION INTRAVENOUS CONTINUOUS PRN
Status: DISCONTINUED | OUTPATIENT
Start: 2023-12-15 | End: 2023-12-15 | Stop reason: HOSPADM

## 2023-12-15 RX ORDER — LORAZEPAM 2 MG/ML
0.5 INJECTION INTRAMUSCULAR
Status: DISCONTINUED | OUTPATIENT
Start: 2023-12-15 | End: 2023-12-15 | Stop reason: HOSPADM

## 2023-12-15 RX ORDER — OXYCODONE HYDROCHLORIDE AND ACETAMINOPHEN 5; 325 MG/1; MG/1
1 TABLET ORAL EVERY 6 HOURS PRN
Qty: 12 TABLET | Refills: 0 | Status: SHIPPED | OUTPATIENT
Start: 2023-12-15 | End: 2023-12-18

## 2023-12-15 RX ORDER — DEXAMETHASONE SODIUM PHOSPHATE 4 MG/ML
INJECTION, SOLUTION INTRA-ARTICULAR; INTRALESIONAL; INTRAMUSCULAR; INTRAVENOUS; SOFT TISSUE PRN
Status: DISCONTINUED | OUTPATIENT
Start: 2023-12-15 | End: 2023-12-15 | Stop reason: SDUPTHER

## 2023-12-15 RX ORDER — LABETALOL HYDROCHLORIDE 5 MG/ML
10 INJECTION, SOLUTION INTRAVENOUS
Status: DISCONTINUED | OUTPATIENT
Start: 2023-12-15 | End: 2023-12-15 | Stop reason: HOSPADM

## 2023-12-15 RX ORDER — ONDANSETRON 2 MG/ML
4 INJECTION INTRAMUSCULAR; INTRAVENOUS
Status: DISCONTINUED | OUTPATIENT
Start: 2023-12-15 | End: 2023-12-15 | Stop reason: HOSPADM

## 2023-12-15 RX ORDER — DIPHENHYDRAMINE HYDROCHLORIDE 50 MG/ML
12.5 INJECTION INTRAMUSCULAR; INTRAVENOUS
Status: DISCONTINUED | OUTPATIENT
Start: 2023-12-15 | End: 2023-12-15 | Stop reason: HOSPADM

## 2023-12-15 RX ORDER — ONDANSETRON 4 MG/1
4 TABLET, ORALLY DISINTEGRATING ORAL EVERY 8 HOURS PRN
Status: DISCONTINUED | OUTPATIENT
Start: 2023-12-15 | End: 2023-12-15 | Stop reason: HOSPADM

## 2023-12-15 RX ORDER — ONDANSETRON 2 MG/ML
INJECTION INTRAMUSCULAR; INTRAVENOUS PRN
Status: DISCONTINUED | OUTPATIENT
Start: 2023-12-15 | End: 2023-12-15 | Stop reason: SDUPTHER

## 2023-12-15 RX ORDER — LIDOCAINE HYDROCHLORIDE 10 MG/ML
INJECTION, SOLUTION EPIDURAL; INFILTRATION; INTRACAUDAL; PERINEURAL PRN
Status: DISCONTINUED | OUTPATIENT
Start: 2023-12-15 | End: 2023-12-15 | Stop reason: ALTCHOICE

## 2023-12-15 RX ORDER — CEFAZOLIN SODIUM 1 G/3ML
INJECTION, POWDER, FOR SOLUTION INTRAMUSCULAR; INTRAVENOUS
Status: DISCONTINUED
Start: 2023-12-15 | End: 2023-12-15 | Stop reason: HOSPADM

## 2023-12-15 RX ORDER — FENTANYL CITRATE 50 UG/ML
INJECTION, SOLUTION INTRAMUSCULAR; INTRAVENOUS PRN
Status: DISCONTINUED | OUTPATIENT
Start: 2023-12-15 | End: 2023-12-15 | Stop reason: SDUPTHER

## 2023-12-15 RX ORDER — DEXMEDETOMIDINE HYDROCHLORIDE 100 UG/ML
INJECTION, SOLUTION INTRAVENOUS PRN
Status: DISCONTINUED | OUTPATIENT
Start: 2023-12-15 | End: 2023-12-15 | Stop reason: SDUPTHER

## 2023-12-15 RX ORDER — OXYCODONE HYDROCHLORIDE 5 MG/1
10 TABLET ORAL PRN
Status: COMPLETED | OUTPATIENT
Start: 2023-12-15 | End: 2023-12-15

## 2023-12-15 RX ORDER — OXYCODONE HYDROCHLORIDE 5 MG/1
5 TABLET ORAL PRN
Status: COMPLETED | OUTPATIENT
Start: 2023-12-15 | End: 2023-12-15

## 2023-12-15 RX ORDER — SODIUM CHLORIDE, SODIUM LACTATE, POTASSIUM CHLORIDE, CALCIUM CHLORIDE 600; 310; 30; 20 MG/100ML; MG/100ML; MG/100ML; MG/100ML
INJECTION, SOLUTION INTRAVENOUS ONCE
Status: DISCONTINUED | OUTPATIENT
Start: 2023-12-15 | End: 2023-12-15 | Stop reason: HOSPADM

## 2023-12-15 RX ORDER — SODIUM CHLORIDE, SODIUM LACTATE, POTASSIUM CHLORIDE, CALCIUM CHLORIDE 600; 310; 30; 20 MG/100ML; MG/100ML; MG/100ML; MG/100ML
INJECTION, SOLUTION INTRAVENOUS CONTINUOUS
Status: DISCONTINUED | OUTPATIENT
Start: 2023-12-15 | End: 2023-12-15 | Stop reason: HOSPADM

## 2023-12-15 RX ORDER — METOCLOPRAMIDE HYDROCHLORIDE 5 MG/ML
10 INJECTION INTRAMUSCULAR; INTRAVENOUS
Status: DISCONTINUED | OUTPATIENT
Start: 2023-12-15 | End: 2023-12-15 | Stop reason: HOSPADM

## 2023-12-15 RX ORDER — ONDANSETRON 2 MG/ML
4 INJECTION INTRAMUSCULAR; INTRAVENOUS EVERY 6 HOURS PRN
Status: DISCONTINUED | OUTPATIENT
Start: 2023-12-15 | End: 2023-12-15 | Stop reason: HOSPADM

## 2023-12-15 RX ORDER — SODIUM CHLORIDE 9 MG/ML
INJECTION, SOLUTION INTRAVENOUS PRN
Status: DISCONTINUED | OUTPATIENT
Start: 2023-12-15 | End: 2023-12-15 | Stop reason: HOSPADM

## 2023-12-15 RX ORDER — IPRATROPIUM BROMIDE AND ALBUTEROL SULFATE 2.5; .5 MG/3ML; MG/3ML
1 SOLUTION RESPIRATORY (INHALATION)
Status: DISCONTINUED | OUTPATIENT
Start: 2023-12-15 | End: 2023-12-15 | Stop reason: HOSPADM

## 2023-12-15 RX ORDER — LIDOCAINE HYDROCHLORIDE 20 MG/ML
INJECTION, SOLUTION EPIDURAL; INFILTRATION; INTRACAUDAL; PERINEURAL PRN
Status: DISCONTINUED | OUTPATIENT
Start: 2023-12-15 | End: 2023-12-15 | Stop reason: SDUPTHER

## 2023-12-15 RX ORDER — LIDOCAINE 4 G/G
1 PATCH TOPICAL AS NEEDED
Status: DISCONTINUED | OUTPATIENT
Start: 2023-12-15 | End: 2023-12-15 | Stop reason: HOSPADM

## 2023-12-15 RX ORDER — FENTANYL CITRATE 50 UG/ML
50 INJECTION, SOLUTION INTRAMUSCULAR; INTRAVENOUS EVERY 5 MIN PRN
Status: DISCONTINUED | OUTPATIENT
Start: 2023-12-15 | End: 2023-12-15 | Stop reason: HOSPADM

## 2023-12-15 RX ORDER — PROPOFOL 10 MG/ML
INJECTION, EMULSION INTRAVENOUS PRN
Status: DISCONTINUED | OUTPATIENT
Start: 2023-12-15 | End: 2023-12-15 | Stop reason: SDUPTHER

## 2023-12-15 RX ORDER — MEPERIDINE HYDROCHLORIDE 25 MG/ML
12.5 INJECTION INTRAMUSCULAR; INTRAVENOUS; SUBCUTANEOUS EVERY 5 MIN PRN
Status: DISCONTINUED | OUTPATIENT
Start: 2023-12-15 | End: 2023-12-15 | Stop reason: HOSPADM

## 2023-12-15 RX ORDER — SODIUM CHLORIDE 0.9 % (FLUSH) 0.9 %
5-40 SYRINGE (ML) INJECTION PRN
Status: DISCONTINUED | OUTPATIENT
Start: 2023-12-15 | End: 2023-12-15 | Stop reason: HOSPADM

## 2023-12-15 RX ADMIN — FENTANYL CITRATE 50 MCG: 50 INJECTION, SOLUTION INTRAMUSCULAR; INTRAVENOUS at 08:00

## 2023-12-15 RX ADMIN — CEFAZOLIN SODIUM 3000 MG: 1 INJECTION, POWDER, FOR SOLUTION INTRAMUSCULAR; INTRAVENOUS at 07:58

## 2023-12-15 RX ADMIN — DEXAMETHASONE SODIUM PHOSPHATE 4 MG: 4 INJECTION, SOLUTION INTRA-ARTICULAR; INTRALESIONAL; INTRAMUSCULAR; INTRAVENOUS; SOFT TISSUE at 08:02

## 2023-12-15 RX ADMIN — FENTANYL CITRATE 50 MCG: 50 INJECTION, SOLUTION INTRAMUSCULAR; INTRAVENOUS at 08:39

## 2023-12-15 RX ADMIN — SODIUM CHLORIDE, POTASSIUM CHLORIDE, SODIUM LACTATE AND CALCIUM CHLORIDE: 600; 310; 30; 20 INJECTION, SOLUTION INTRAVENOUS at 06:56

## 2023-12-15 RX ADMIN — DEXMEDETOMIDINE 10 MCG: 100 INJECTION, SOLUTION INTRAVENOUS at 08:17

## 2023-12-15 RX ADMIN — PROPOFOL 50 MG: 10 INJECTION, EMULSION INTRAVENOUS at 07:52

## 2023-12-15 RX ADMIN — OXYCODONE 5 MG: 5 TABLET ORAL at 09:34

## 2023-12-15 RX ADMIN — LIDOCAINE HYDROCHLORIDE 40 MG: 20 INJECTION, SOLUTION EPIDURAL; INFILTRATION; INTRACAUDAL; PERINEURAL at 07:52

## 2023-12-15 RX ADMIN — ONDANSETRON 4 MG: 2 INJECTION INTRAMUSCULAR; INTRAVENOUS at 08:02

## 2023-12-15 RX ADMIN — DEXMEDETOMIDINE 10 MCG: 100 INJECTION, SOLUTION INTRAVENOUS at 07:52

## 2023-12-15 RX ADMIN — DEXMEDETOMIDINE 10 MCG: 100 INJECTION, SOLUTION INTRAVENOUS at 08:03

## 2023-12-15 RX ADMIN — FENTANYL CITRATE 50 MCG: 50 INJECTION, SOLUTION INTRAMUSCULAR; INTRAVENOUS at 07:52

## 2023-12-15 ASSESSMENT — PAIN DESCRIPTION - PAIN TYPE: TYPE: SURGICAL PAIN;CHRONIC PAIN

## 2023-12-15 ASSESSMENT — PAIN DESCRIPTION - ORIENTATION: ORIENTATION: RIGHT

## 2023-12-15 ASSESSMENT — PAIN - FUNCTIONAL ASSESSMENT
PAIN_FUNCTIONAL_ASSESSMENT: 0-10
PAIN_FUNCTIONAL_ASSESSMENT: 0-10
PAIN_FUNCTIONAL_ASSESSMENT: PREVENTS OR INTERFERES SOME ACTIVE ACTIVITIES AND ADLS

## 2023-12-15 ASSESSMENT — PAIN SCALES - GENERAL
PAINLEVEL_OUTOF10: 4
PAINLEVEL_OUTOF10: 0
PAINLEVEL_OUTOF10: 10

## 2023-12-15 ASSESSMENT — PAIN DESCRIPTION - LOCATION
LOCATION: BACK;FOOT
LOCATION: FOOT

## 2023-12-15 ASSESSMENT — PAIN DESCRIPTION - DESCRIPTORS
DESCRIPTORS: DISCOMFORT
DESCRIPTORS: DISCOMFORT
DESCRIPTORS: DISCOMFORT;ACHING

## 2023-12-15 NOTE — OP NOTE
Operative Note      Patient: Jie Garduno  YOB: 1955  MRN: [id number]    Date of Procedure: 12/15/2023    Pre-Op Diagnosis Codes:     * Gout, unspecified cause, unspecified chronicity, unspecified site [M10.9]    Post-Op Diagnosis: Same       Procedure(s):  INCISION AND DRAINAGE RIGHT FIRST METATARSAL PHALANGEAL JOINT    Surgeon(s):  Isabel Cabrera DPM    Assistant:   * No surgical staff found *    Anesthesia: Monitor Anesthesia Care    Estimated Blood Loss (mL): Minimal    Complications: None    Specimens:   ID Type Source Tests Collected by Time Destination   1 : Base of first phalanx Bone Toe SURGICAL PATHOLOGY Isabel Cabrera DPM 12/15/2023 0810    2 : Head of first Metatarsal Bone Toe SURGICAL PATHOLOGY Isabel Cabrera DPM 12/15/2023 6551    A : Deep wound 1st metatarsal phalangeal Swab Toe CULTURE, Ruth Mancuso DPM 12/15/2023 0827      Implants:  * No implants in log *      Drains: * No LDAs found *    Findings: Extensive Gouty tophi noted    Detailed Description of Procedure:   Patient was seen in the pre-operative holding area and all questions were answered and all concerns were addressed. The operative procedure was discussed in great detail, with all possible complications highlighted. Patient verbalized complete understanding and the consent was signed and witnessed. The operative limb was marked and the pt was transported to the operating room. The patient was transferred to the operating table and anesthesia was administered as indicated above. The lower extremity was scrubbed and draped in sterile fashion. A time out was performed to confirm the correct patient, correct procedure, correct limb, abx, allergies, fire risk and attendees within the operating room. Upon completion, procedure #1 commenced. ***    Patient tolerated the above procedures well and all post operative counts were correct. Patient was transferred to PACU without incident.  A thorough neurovascular check

## 2023-12-15 NOTE — H&P
Patient: Lalita Christina MRN: [id number]  SSN: [id number]    YOB: 1955  Age: 70 y.o. Sex: male      History and Physical    Lalita Christina is a 70 y.o. male having foot surgery. Allergies: Allergies   Allergen Reactions    Amitriptyline Angioedema    Naproxen      Other reaction(s): Unknown (comments)  Pt not sure of reaction    Sulfa Antibiotics Nausea And Vomiting        Chief Complaint: 70 y.o.male here for foot surgery. History of Present Illness: foot pain    Past Medical History:   Diagnosis Date    Arthritis     CAD (coronary artery disease)     Chronic obstructive pulmonary disease (HCC)     Diabetes ([address])     Gout     Hypertension     Ill-defined condition     Sleep apnea     cpap      Past Surgical History:   Procedure Laterality Date    FOOT SURGERY      right heel - foriegn object    HX VEIN ABLATION ADHESIVE      ORTHOPEDIC SURGERY      back surgery    PACEMAKER      aicd -      Family History   Problem Relation Age of Onset    Diabetes Mother     Heart Disease Father     Hypertension Father     Diabetes Sister     Diabetes Brother     Hypertension Mother     Heart Disease Mother     Kidney Disease Mother       Social History     Tobacco Use    Smoking status: Never    Smokeless tobacco: Never   Substance Use Topics    Alcohol use: Not Currently        Prior to Admission medications    Medication Sig Start Date End Date Taking? Authorizing Provider   gabapentin (NEURONTIN) 300 MG capsule Take 1 capsule by mouth 4 times daily. Yes ProviderDoretha MD   oxyCODONE (ROXICODONE) 5 MG immediate release tablet Take 1 tablet by mouth 2 times daily.    Yes ProviderDoretha MD   metOLazone (ZAROXOLYN) 5 MG tablet Take 1 tablet by mouth daily   Yes Doretha Mccarthy MD   doxycycline hyclate (VIBRA-TABS) 100 MG tablet Take 1 tablet by mouth 2 times daily for 10 days 12/11/23 12/21/23  Branden Phelps MD   allopurinol (ZYLOPRIM) 100 MG tablet Take 2 tablets by mouth daily 11/7/23 12/7/23  Marv Kong MD   miconazole (ZEASORB-AF) 2 % powder Apply topically to affected areas daily. 11/7/23   Yodit Kolb MD   ammonium lactate (LAC-HYDRIN) 12 % lotion APPLY TO THE AFFECTED AREA(S) TOPICALLY 3 TIMES DAILY 10/6/23   Doretha Mccarthy MD   betamethasone dipropionate 0.05 % cream  10/4/23   Doretha Mccarthy MD   HUMALOG KWIKPEN 100 UNIT/ML SOPN INJECT INSULIN SUBCUTANEOUSLY WITH EACH MEAL. AFTER TESTING IF GLUCOSE -199=2 UNITS, 200-249=4 UNITS, 250-299=6 UNITS, 300-349=8 UNITS, 350-399=10 UNITS, IF OVER 400 CALL DOCTOR 10/5/23   Doretha Mccarthy MD   bumetanide (BUMEX) 2 MG tablet Take 1 tablet by mouth 2 times daily 8/11/23   Doretha Mccarthy MD   ENTRESTO 24-26 MG per tablet Take 1 tablet by mouth 2 times daily 8/9/23   Doretha Mccarthy MD   potassium chloride (MICRO-K) 10 MEQ extended release capsule Take 1 capsule by mouth 4 times daily    Doretha Mccarthy MD   ipratropium 0.5 mg-albuterol 2.5 mg (DUONEB) 0.5-2.5 (3) MG/3ML SOLN nebulizer solution Inhale 3 mLs into the lungs every 6 hours while awake 6/20/23   Laura Pino MD   albuterol sulfate HFA (PROVENTIL;VENTOLIN;PROAIR) 108 (90 Base) MCG/ACT inhaler Inhale 1 puff into the lungs every 4 hours as needed 9/14/22   Doretha Mccarthy MD   ascorbic acid (VITAMIN C) 500 MG tablet Take 2 tablets by mouth 2 times daily    Doretha Mccarthy MD   aspirin 81 MG chewable tablet Take 1 tablet by mouth daily    Doretha Mccarthy MD   atorvastatin (LIPITOR) 40 MG tablet Take 1 tablet by mouth nightly at bedtime.  4/24/23   Doretha Mccarthy MD   B Complex Vitamins (VITAMIN B COMPLEX) TABS Take 1 tablet by mouth daily    Doretha Mccarthy MD   colchicine (COLCRYS) 0.6 MG tablet Take 1 tablet by mouth daily 4/6/23   Doretha Mccarthy MD   flunisolide (NASALIDE) 25 MCG/ACT (0.025%) SOLN 2 sprays 2 times daily 9/9/22   Provider, MD Doretha

## 2023-12-15 NOTE — ANESTHESIA PRE PROCEDURE
Department of Anesthesiology  Preprocedure Note       Name:  Laya Hu   Age:  70 y.o.  :  1955                                          MRN:  [id number]         Date:  12/15/2023      Surgeon: Boo Connors):  Dali Olvera DPM    Procedure: Procedure(s):  INCISION AND DRAINAGE RIGHT FIRST METATARSAL PHALANGEAL JOINT    Medications prior to admission:   Prior to Admission medications    Medication Sig Start Date End Date Taking? Authorizing Provider   gabapentin (NEURONTIN) 300 MG capsule Take 1 capsule by mouth 4 times daily. Yes Doretha Mccarthy MD   oxyCODONE (ROXICODONE) 5 MG immediate release tablet Take 1 tablet by mouth 2 times daily. Yes Doretha Mccarthy MD   metOLazone (ZAROXOLYN) 5 MG tablet Take 1 tablet by mouth daily   Yes Doretha Mccarthy MD   doxycycline hyclate (VIBRA-TABS) 100 MG tablet Take 1 tablet by mouth 2 times daily for 10 days 23  Lester Loving MD   allopurinol (ZYLOPRIM) 100 MG tablet Take 2 tablets by mouth daily 23  Lester Loving MD   miconazole (ZEASORB-AF) 2 % powder Apply topically to affected areas daily. 23   Alcides Kolb MD   ammonium lactate (LAC-HYDRIN) 12 % lotion APPLY TO THE AFFECTED AREA(S) TOPICALLY 3 TIMES DAILY 10/6/23   Doretha Mccarthy MD   betamethasone dipropionate 0.05 % cream  10/4/23   Doretha Mccarthy MD   HUMALOG KWIKPEN 100 UNIT/ML SOPN INJECT INSULIN SUBCUTANEOUSLY WITH EACH MEAL.  AFTER TESTING IF GLUCOSE -199=2 UNITS, 200-249=4 UNITS, 250-299=6 UNITS, 300-349=8 UNITS, 350-399=10 UNITS, IF OVER 400 CALL DOCTOR 10/5/23   Doretha Mccarthy MD   bumetanide (BUMEX) 2 MG tablet Take 1 tablet by mouth 2 times daily 23   Doretha Mccarthy MD   ENTRESTO 24-26 MG per tablet Take 1 tablet by mouth 2 times daily 23   Doretha Mccarthy MD   potassium chloride (MICRO-K) 10 MEQ extended release capsule Take 1 capsule by mouth 4 times daily    Shari MD Doretha   ipratropium 0.5 mg-albuterol 2.5 mg (DUONEB) 0.5-2.5 (3) MG/3ML SOLN nebulizer solution Inhale 3 mLs into the lungs every 6 hours while awake 6/20/23   Anastasiia Pino MD   albuterol sulfate HFA (PROVENTIL;VENTOLIN;PROAIR) 108 (90 Base) MCG/ACT inhaler Inhale 1 puff into the lungs every 4 hours as needed 9/14/22   Doretha Mccarthy MD   ascorbic acid (VITAMIN C) 500 MG tablet Take 2 tablets by mouth 2 times daily    Doretha Mccarthy MD   aspirin 81 MG chewable tablet Take 1 tablet by mouth daily    Doretha Mccarthy MD   atorvastatin (LIPITOR) 40 MG tablet Take 1 tablet by mouth nightly at bedtime.  4/24/23   Doretha Mccarthy MD   B Complex Vitamins (VITAMIN B COMPLEX) TABS Take 1 tablet by mouth daily    Doretha Mccarthy MD   colchicine (COLCRYS) 0.6 MG tablet Take 1 tablet by mouth daily 4/6/23   Doretha Mccarthy MD   flunisolide (NASALIDE) 25 MCG/ACT (0.025%) SOLN 2 sprays 2 times daily 9/9/22   Doretha Mccarthy MD   fluticasone-umeclidin-vilant (TRELEGY ELLIPTA) 323-64.8-49 MCG/ACT AEPB inhaler INHALE 1 INHALATION BY MOUTH ONCE DAILY 1/4/22   Doretha Mccarthy MD   Insulin Glargine, 2 Unit Dial, (TOUJEO MAX SOLOSTAR) 300 UNIT/ML SOPN 90unit am , 32 at bedtime 4/7/23   Doretha Mccarthy MD   MM PEN NEEDLES 31G X 8 MM MISC USE 1 NEW PEN NEEDLE TO INJECT INSULIN SUBCUTANEOUSLY FIVE TIMES DAILY 2/27/23   Doretha Mccarthy MD   vitamin E 400 UNIT capsule Take 2 capsules by mouth 2 times daily 6/8/21   Doretha Mccarthy MD       Current medications:    Current Facility-Administered Medications   Medication Dose Route Frequency Provider Last Rate Last Admin    lactated ringers IV soln infusion   IntraVENous Continuous Cherylkiel Zacarias DPM 20 mL/hr at 12/15/23 0656 New Bag at 12/15/23 0656    ceFAZolin (ANCEF) 3,000 mg in sodium chloride 0.9 % 100 mL IVPB  3,000 mg IntraVENous Once Cheryln Jungling, DPM        ceFAZolin (ANCEF) 1 g injection Results   Component Value Date/Time    WBC 8.4 08/26/2023 07:08 AM    RBC 4.01 08/26/2023 07:08 AM    HGB 11.5 08/26/2023 07:08 AM    HCT 37.2 08/26/2023 07:08 AM    MCV 92.8 08/26/2023 07:08 AM    RDW 12.9 08/26/2023 07:08 AM     08/26/2023 07:08 AM       CMP:   Lab Results   Component Value Date/Time     08/26/2023 07:08 AM    K 4.2 08/26/2023 07:08 AM     08/26/2023 07:08 AM    CO2 30 08/26/2023 07:08 AM    BUN 21 08/26/2023 07:08 AM    CREATININE 1.26 08/26/2023 07:08 AM    GFRAA >60 07/27/2022 07:33 AM    AGRATIO 0.8 04/19/2023 04:41 PM    LABGLOM >60 08/26/2023 07:08 AM    GLUCOSE 137 08/26/2023 07:08 AM    PROT 7.1 08/26/2023 07:08 AM    CALCIUM 9.0 08/26/2023 07:08 AM    BILITOT 0.4 08/26/2023 07:08 AM    ALKPHOS 78 08/26/2023 07:08 AM    ALKPHOS 95 04/19/2023 04:41 PM    AST 20 08/26/2023 07:08 AM    ALT 23 08/26/2023 07:08 AM       POC Tests:   Recent Labs     12/15/23  0644   POCGLU 182*       Coags: No results found for: \"PROTIME\", \"INR\", \"APTT\"    HCG (If Applicable): No results found for: \"PREGTESTUR\", \"PREGSERUM\", \"HCG\", \"HCGQUANT\"     ABGs: No results found for: \"PHART\", \"PO2ART\", \"KXL2LWF\", \"VSB3EZM\", \"BEART\", \"L5NBOGOF\"     Type & Screen (If Applicable):  No results found for: \"LABABO\", \"LABRH\"    Drug/Infectious Status (If Applicable):  No results found for: \"HIV\", \"HEPCAB\"    COVID-19 Screening (If Applicable):   Lab Results   Component Value Date/Time    COVID19 Not Detected 07/11/2022 06:30 PM           Anesthesia Evaluation  Patient summary reviewed and Nursing notes reviewed   no history of anesthetic complications:   Airway: Mallampati: III  TM distance: >3 FB   Neck ROM: full  Mouth opening: > = 3 FB   Dental:    (+) upper dentures      Pulmonary:normal exam  breath sounds clear to auscultation  (+)  COPD:  shortness of breath:   sleep apnea:                                  Cardiovascular:    (+) hypertension:, CAD:, CHF:        Rhythm: regular  Rate: normal

## 2023-12-17 LAB
BACTERIA SPEC CULT: NORMAL
GRAM STN SPEC: NORMAL
GRAM STN SPEC: NORMAL
Lab: NORMAL

## 2024-01-11 ENCOUNTER — HOSPITAL ENCOUNTER (OUTPATIENT)
Facility: HOSPITAL | Age: 69
Discharge: HOME | End: 2024-01-11
Attending: SPECIALIST
Payer: MEDICARE

## 2024-01-11 VITALS
SYSTOLIC BLOOD PRESSURE: 149 MMHG | DIASTOLIC BLOOD PRESSURE: 79 MMHG | RESPIRATION RATE: 20 BRPM | TEMPERATURE: 97.5 F | HEART RATE: 102 BPM

## 2024-01-11 DIAGNOSIS — L97.822 NON-PRESSURE CHRONIC ULCER OF OTHER PART OF LEFT LOWER LEG WITH FAT LAYER EXPOSED (HCC): Primary | ICD-10-CM

## 2024-01-11 DIAGNOSIS — S91.301D OPEN WOUND OF RIGHT FOOT, SUBSEQUENT ENCOUNTER: ICD-10-CM

## 2024-01-11 PROCEDURE — 11042 DBRDMT SUBQ TIS 1ST 20SQCM/<: CPT

## 2024-01-11 RX ORDER — SODIUM CHLOR/HYPOCHLOROUS ACID 0.033 %
SOLUTION, IRRIGATION IRRIGATION ONCE
OUTPATIENT
Start: 2024-01-11 | End: 2024-01-11

## 2024-01-11 RX ORDER — LIDOCAINE HYDROCHLORIDE 20 MG/ML
JELLY TOPICAL ONCE
OUTPATIENT
Start: 2024-01-11 | End: 2024-01-11

## 2024-01-11 RX ORDER — TRIAMCINOLONE ACETONIDE 1 MG/G
OINTMENT TOPICAL ONCE
OUTPATIENT
Start: 2024-01-11 | End: 2024-01-11

## 2024-01-11 ASSESSMENT — PAIN DESCRIPTION - DESCRIPTORS: DESCRIPTORS: ACHING

## 2024-01-11 ASSESSMENT — PAIN DESCRIPTION - LOCATION: LOCATION: FOOT

## 2024-01-11 ASSESSMENT — PAIN DESCRIPTION - ORIENTATION: ORIENTATION: RIGHT

## 2024-01-11 NOTE — DISCHARGE INSTRUCTIONS
Wound Clinic Physician Orders and Discharge Instructions  [facility]  [address], [address]  [place] [address]  Telephone: [phone]     FAX [phone]    NAME:  Shantanu Casillas  YOB: 1955  MEDICAL RECORD NUMBER:  [id number]  DATE:  1/11/2024      Return Appointment:  [] Dressing Supply Provider:   [x] Home Healthcare: [facility]  [x] Return Appointment: 1 Week(s)  [] Nurse Visit:     [] Discharge from [facility]: [] Healed        [] Refer to Provider:         [] Consult    Follow-up Information:  [] Ordered Tests:  [] Referrals:   [] Rx:   [] Culture:  [] Wound Vac:  [] Other:       Wound Cleansing:   Do not scrub or use excessive force.  Cleanse wound prior to applying a clean dressing with:  [x] Normal Saline   [] Keep Wound Dry in Shower     [] Wound Cleanser   [x] Cleanse wound with Mild Soap & Water    [] Other:       Topical Treatments:  Do not apply lotions, creams, or ointments to wound bed unless directed.   [] Apply moisturizing lotion to skin surrounding the wound prior to dressing change.  [] Apply antifungal ointment to skin surrounding the wound prior to dressing change.  [] Apply thin film of moisture barrier ointment to skin immediately around wound.  [] Apply Betadine to skin immediately around wound   [] Other:      Dressings:           Wound Location: Left leg    [x] Apply Primary Dressing:       [] MediHoney Gel [] MediHoney Alginate  [x] Calcium Alginate with Silver (silvercel) - 2nd  [] Calcium Alginate without silver   [] Collagen with silver [] Collagen without Silver    [] Santyl with moist saline gauze     [] Hydrofera Blue (cut to size and moistened with normal saline)  [] Hydrofera Blue Ready (cut to size)      [] Normal Saline wet to dry  [] Betadine wet to dry    [] Hydrogel  [] Mepitel     [] Bactroban/Mupirocin   [] Iodoform Packing Strip [] Plain Packing Strip   [] Skin Sub:   [x] Adaptic - 1st  [x] Other: Foam to tender area

## 2024-01-11 NOTE — WOUND CARE
Multilayer Compression Wrap   (Not Unna) Below the Knee    NAME:  Shantanu Casillas  YOB: 1955  MEDICAL RECORD NUMBER:  [id number]  DATE:  1/11/2024    Multilayer compression wrap: Removed old Multilayer wrap if indicated and wash leg with mild soap/water.  Applied moisturizing agent to dry skin as needed.   Applied primary and secondary dressing as ordered.  Applied multilayered dressing below the knee to left lower leg.  Instructed patient/caregiver not to remove dressing and to keep it clean and dry.   Instructed patient/caregiver on complications to report to provider, such as pain, numbness in toes, heavy drainage, and slippage of dressing.  Instructed patient on purpose of compression dressing and on activity and exercise recommendations.      Electronically signed by Margarette Edwards LPN on 1/11/2024 at 3:07 PM  
on left medial foot under compression    [x] Cover and Secure with:     [x] Gauze [] Aris [] Kerlix   [] Foam  [x] Super Absorbant [] ABD     [] Ace Wrap [] Other:    Limit contact of tape with skin.    [x] Change dressing: [] Daily    [] Every Other Day   [] Twice per week   [x] Three times per week   [] Once a week [] Do Not Change Dressing   [] Other:    Dressings:           Wound Location: Right foot   [x] Apply Primary Dressing:       [] MediHoney Gel [] MediHoney Alginate  [] Calcium Alginate with Silver (silvercel)  [] Calcium Alginate without silver   [] Collagen with silver [] Collagen without Silver    [] Santyl with moist saline gauze     [] Hydrofera Blue (cut to size and moistened with normal saline)  [] Hydrofera Blue Ready (cut to size)      [] Normal Saline wet to dry  [] Betadine wet to dry    [] Hydrogel  [] Mepitel     [] Bactroban/Mupirocin   [] Iodoform Packing Strip [] Plain Packing Strip   [] Skin Sub:   [] Adaptic   [x] Other: Band-Aid     [x] Cover and Secure with:     [x] Gauze [] Aris [] Kerlix   [] Foam  [] Super Absorbant [] ABD     [] Ace Wrap [] Other:    Limit contact of tape with skin.    [x] Change dressing: [] Daily    [] Every Other Day   [] Twice per week   [x] Three times per week   [] Once a week [] Do Not Change Dressing   [] Other:     Edema Control:  Apply: [] Compression Stocking:  mmHg  []Right Leg []Left Leg   [x] Tubigrip [x]Right Leg Double Layer []Left Leg Double Layer      []Right Leg Single Layer []Left Leg Single Layer      [x] Elevate leg(s) above the level of the heart when sitting.    [x] Avoid prolonged standing in one place.     Compression:  Apply: [x] Compression Wrap to []RightLeg [x]Left Leg    []  Coflex [x] Coflex Lite  [] Unna with Calamine Lite     [x] Do not get leg(s) with wrap wet. If wrap becomes too tight, call the wound center and remove wrap from leg by unrolling each layer.   [x] Elevate leg(s) above the level of the heart when sitting.    [x]

## 2024-01-18 ENCOUNTER — HOSPITAL ENCOUNTER (OUTPATIENT)
Facility: HOSPITAL | Age: 69
Discharge: HOME | End: 2024-01-18
Attending: SPECIALIST
Payer: MEDICARE

## 2024-01-18 VITALS
TEMPERATURE: 98.8 F | RESPIRATION RATE: 20 BRPM | HEART RATE: 98 BPM | OXYGEN SATURATION: 97 % | SYSTOLIC BLOOD PRESSURE: 124 MMHG | DIASTOLIC BLOOD PRESSURE: 77 MMHG

## 2024-01-18 DIAGNOSIS — S91.301D OPEN WOUND OF RIGHT FOOT, SUBSEQUENT ENCOUNTER: ICD-10-CM

## 2024-01-18 DIAGNOSIS — L97.822 NON-PRESSURE CHRONIC ULCER OF OTHER PART OF LEFT LOWER LEG WITH FAT LAYER EXPOSED (HCC): Primary | ICD-10-CM

## 2024-01-18 PROCEDURE — 11045 DBRDMT SUBQ TISS EACH ADDL: CPT

## 2024-01-18 PROCEDURE — 11042 DBRDMT SUBQ TIS 1ST 20SQCM/<: CPT

## 2024-01-18 RX ORDER — LIDOCAINE HYDROCHLORIDE 20 MG/ML
JELLY TOPICAL ONCE
OUTPATIENT
Start: 2024-01-18 | End: 2024-01-18

## 2024-01-18 RX ORDER — SODIUM CHLOR/HYPOCHLOROUS ACID 0.033 %
SOLUTION, IRRIGATION IRRIGATION ONCE
OUTPATIENT
Start: 2024-01-18 | End: 2024-01-18

## 2024-01-18 RX ORDER — TRIAMCINOLONE ACETONIDE 1 MG/G
OINTMENT TOPICAL ONCE
OUTPATIENT
Start: 2024-01-18 | End: 2024-01-18

## 2024-01-18 ASSESSMENT — PAIN SCALES - GENERAL: PAINLEVEL_OUTOF10: 8

## 2024-01-18 NOTE — WOUND CARE
Wound Clinic Physician Orders and Discharge Instructions  [facility]  [address], [address]  [place] [address]  Telephone: [phone]     FAX [phone]    NAME:  Shantanu Casillas  YOB: 1955  MEDICAL RECORD NUMBER:  [id number]  DATE:  1/18/2024      Return Appointment:  [] Dressing Supply Provider:   [x] Home Healthcare: [facility]  [x] Return Appointment: 1 Week(s)  [] Nurse Visit:     [] Discharge from [facility]: [] Healed        [] Refer to Provider:         [] Consult    Follow-up Information:  [] Ordered Tests:  [] Referrals:   [] Rx:   [] Culture:  [] Wound Vac:  [] Other:       Wound Cleansing:   Do not scrub or use excessive force.  Cleanse wound prior to applying a clean dressing with:  [x] Normal Saline   [] Keep Wound Dry in Shower     [] Wound Cleanser   [x] Cleanse wound with Mild Soap & Water    [] Other:       Topical Treatments:  Do not apply lotions, creams, or ointments to wound bed unless directed.   [] Apply moisturizing lotion to skin surrounding the wound prior to dressing change.  [] Apply antifungal ointment to skin surrounding the wound prior to dressing change.  [] Apply thin film of moisture barrier ointment to skin immediately around wound.  [] Apply Betadine to skin immediately around wound   [] Other:      Dressings:           Wound Location: Left leg    [x] Apply Primary Dressing:       [] MediHoney Gel [] MediHoney Alginate  [x] Calcium Alginate with Silver (silvercel) - 2nd  [] Calcium Alginate without silver   [] Collagen with silver [] Collagen without Silver    [] Santyl with moist saline gauze     [] Hydrofera Blue (cut to size and moistened with normal saline)  [] Hydrofera Blue Ready (cut to size)      [] Normal Saline wet to dry  [] Betadine wet to dry    [] Hydrogel  [] Mepitel     [] Bactroban/Mupirocin   [] Iodoform Packing Strip [] Plain Packing Strip   [] Skin Sub:   [x] Adaptic - 1st  [x] Other: Foam to tender area

## 2024-01-18 NOTE — DISCHARGE INSTRUCTIONS
on left medial foot under compression    [x] Cover and Secure with:     [x] Gauze [] Aris [] Kerlix   [] Foam  [x] Super Absorbant [] ABD     [] Ace Wrap [] Other:    Limit contact of tape with skin.    [x] Change dressing: [] Daily    [] Every Other Day   [] Twice per week   [x] Three times per week   [] Once a week [] Do Not Change Dressing   [] Other:    Dressings:           Wound Location: Right foot   [x] Apply Primary Dressing:       [] MediHoney Gel [] MediHoney Alginate  [x] Calcium Alginate with Silver (silvercel)  [] Calcium Alginate without silver   [] Collagen with silver [] Collagen without Silver    [] Santyl with moist saline gauze     [] Hydrofera Blue (cut to size and moistened with normal saline)  [] Hydrofera Blue Ready (cut to size)      [] Normal Saline wet to dry  [] Betadine wet to dry    [] Hydrogel  [] Mepitel     [] Bactroban/Mupirocin   [] Iodoform Packing Strip [] Plain Packing Strip   [] Skin Sub:   [] Adaptic   [] Other: Band-Aid     [x] Cover and Secure with:     [x] Gauze [] Aris [x] Kerlix   [] Foam  [] Super Absorbant [] ABD     [] Ace Wrap [] Other:    Limit contact of tape with skin.    [x] Change dressing: [] Daily    [] Every Other Day   [] Twice per week   [x] Three times per week   [] Once a week [] Do Not Change Dressing   [] Other:     Edema Control:  Apply: [] Compression Stocking:  mmHg  []Right Leg []Left Leg   [x] Tubigrip [x]Right Leg Double Layer []Left Leg Double Layer      []Right Leg Single Layer []Left Leg Single Layer      [x] Elevate leg(s) above the level of the heart when sitting.    [x] Avoid prolonged standing in one place.     Compression:  Apply: [x] Compression Wrap to []RightLeg [x]Left Leg    []  Coflex [x] Coflex Lite  [] Unna with Calamine Lite     [x] Do not get leg(s) with wrap wet. If wrap becomes too tight, call the wound center and remove wrap from leg by unrolling each layer.   [x] Elevate leg(s) above the level of the heart when

## 2024-01-25 ENCOUNTER — HOSPITAL ENCOUNTER (OUTPATIENT)
Facility: HOSPITAL | Age: 69
Discharge: HOME | End: 2024-01-25
Attending: SPECIALIST
Payer: MEDICARE

## 2024-01-25 VITALS
RESPIRATION RATE: 20 BRPM | DIASTOLIC BLOOD PRESSURE: 81 MMHG | TEMPERATURE: 97.4 F | HEART RATE: 110 BPM | SYSTOLIC BLOOD PRESSURE: 135 MMHG

## 2024-01-25 DIAGNOSIS — S91.301D OPEN WOUND OF RIGHT FOOT, SUBSEQUENT ENCOUNTER: ICD-10-CM

## 2024-01-25 DIAGNOSIS — L97.822 NON-PRESSURE CHRONIC ULCER OF OTHER PART OF LEFT LOWER LEG WITH FAT LAYER EXPOSED (HCC): Primary | ICD-10-CM

## 2024-01-25 PROCEDURE — 11045 DBRDMT SUBQ TISS EACH ADDL: CPT

## 2024-01-25 PROCEDURE — 11042 DBRDMT SUBQ TIS 1ST 20SQCM/<: CPT

## 2024-01-25 RX ORDER — LIDOCAINE HYDROCHLORIDE 20 MG/ML
JELLY TOPICAL ONCE
OUTPATIENT
Start: 2024-01-25 | End: 2024-01-25

## 2024-01-25 RX ORDER — TRIAMCINOLONE ACETONIDE 1 MG/G
OINTMENT TOPICAL ONCE
OUTPATIENT
Start: 2024-01-25 | End: 2024-01-25

## 2024-01-25 RX ORDER — SODIUM CHLOR/HYPOCHLOROUS ACID 0.033 %
SOLUTION, IRRIGATION IRRIGATION ONCE
OUTPATIENT
Start: 2024-01-25 | End: 2024-01-25

## 2024-01-25 ASSESSMENT — PAIN SCALES - GENERAL: PAINLEVEL_OUTOF10: 8

## 2024-01-25 NOTE — WOUND CARE
Multilayer Compression Wrap   (Not Unna) Below the Knee    NAME:  Shantanu Casillas  YOB: 1955  MEDICAL RECORD NUMBER:  [id number]  DATE:  1/25/2024    Multilayer compression wrap: Removed old Multilayer wrap if indicated and wash leg with mild soap/water.  Applied moisturizing agent to dry skin as needed.   Applied primary and secondary dressing as ordered.  Applied multilayered dressing below the knee to left lower leg.  Instructed patient/caregiver not to remove dressing and to keep it clean and dry.   Instructed patient/caregiver on complications to report to provider, such as pain, numbness in toes, heavy drainage, and slippage of dressing.  Instructed patient on purpose of compression dressing and on activity and exercise recommendations.      Electronically signed by Maryuri Martines RN on 1/25/2024 at 3:17 PM

## 2024-01-25 NOTE — WOUND CARE
Wound Clinic Physician Orders and Discharge Instructions  [facility]  [address], [address]  [place] [address]  Telephone: [phone]     FAX [phone]    NAME:  Shantanu Casillas  YOB: 1955  MEDICAL RECORD NUMBER:  [id number]  DATE:  1/25/2024      Return Appointment:  [] Dressing Supply Provider:   [x] Home Healthcare: [facility]  [x] Return Appointment: 1 Week(s)  [] Nurse Visit:     [] Discharge from [facility]: [] Healed        [] Refer to Provider:         [] Consult    Follow-up Information:  [] Ordered Tests:  [] Referrals:   [] Rx:   [] Culture:  [] Wound Vac:  [] Other:       Wound Cleansing:   Do not scrub or use excessive force.  Cleanse wound prior to applying a clean dressing with:  [x] Normal Saline   [] Keep Wound Dry in Shower     [] Wound Cleanser   [x] Cleanse wound with Mild Soap & Water    [] Other:       Topical Treatments:  Do not apply lotions, creams, or ointments to wound bed unless directed.   [] Apply moisturizing lotion to skin surrounding the wound prior to dressing change.  [] Apply antifungal ointment to skin surrounding the wound prior to dressing change.  [] Apply thin film of moisture barrier ointment to skin immediately around wound.  [] Apply Betadine to skin immediately around wound   [] Other:      Dressings:           Wound Location: Left leg    [x] Apply Primary Dressing:       [] MediHoney Gel [] MediHoney Alginate  [x] Calcium Alginate with Silver (silvercel) - 2nd  [] Calcium Alginate without silver   [] Collagen with silver [] Collagen without Silver    [] Santyl with moist saline gauze     [] Hydrofera Blue (cut to size and moistened with normal saline)  [] Hydrofera Blue Ready (cut to size)      [] Normal Saline wet to dry  [] Betadine wet to dry    [] Hydrogel  [] Mepitel     [] Bactroban/Mupirocin   [] Iodoform Packing Strip [] Plain Packing Strip   [] Skin Sub:   [x] Adaptic - 1st  [x] Other: Foam to tender area

## 2024-01-31 ENCOUNTER — TELEPHONE (OUTPATIENT)
Age: 69
End: 2024-01-31

## 2024-02-08 ENCOUNTER — HOSPITAL ENCOUNTER (OUTPATIENT)
Facility: HOSPITAL | Age: 69
Discharge: HOME | End: 2024-02-08
Attending: SPECIALIST
Payer: MEDICARE

## 2024-02-08 VITALS
RESPIRATION RATE: 20 BRPM | OXYGEN SATURATION: 96 % | DIASTOLIC BLOOD PRESSURE: 69 MMHG | SYSTOLIC BLOOD PRESSURE: 119 MMHG | HEART RATE: 108 BPM

## 2024-02-08 DIAGNOSIS — S91.301D OPEN WOUND OF RIGHT FOOT, SUBSEQUENT ENCOUNTER: ICD-10-CM

## 2024-02-08 DIAGNOSIS — L97.822 NON-PRESSURE CHRONIC ULCER OF OTHER PART OF LEFT LOWER LEG WITH FAT LAYER EXPOSED (HCC): Primary | ICD-10-CM

## 2024-02-08 PROCEDURE — 11042 DBRDMT SUBQ TIS 1ST 20SQCM/<: CPT

## 2024-02-08 PROCEDURE — 11045 DBRDMT SUBQ TISS EACH ADDL: CPT

## 2024-02-08 RX ORDER — TRIAMCINOLONE ACETONIDE 1 MG/G
OINTMENT TOPICAL ONCE
OUTPATIENT
Start: 2024-02-08 | End: 2024-02-08

## 2024-02-08 RX ORDER — LIDOCAINE HYDROCHLORIDE 20 MG/ML
JELLY TOPICAL ONCE
OUTPATIENT
Start: 2024-02-08 | End: 2024-02-08

## 2024-02-08 RX ORDER — SODIUM CHLOR/HYPOCHLOROUS ACID 0.033 %
SOLUTION, IRRIGATION IRRIGATION ONCE
OUTPATIENT
Start: 2024-02-08 | End: 2024-02-08

## 2024-02-08 NOTE — WOUND CARE
Wound Clinic Physician Orders and Discharge Instructions  [facility]  [address], [address]  [place] [address]  Telephone: [phone]     FAX [phone]    NAME:  Shantanu Casillas  YOB: 1955  MEDICAL RECORD NUMBER:  [id number]  DATE:  2/8/2024      Return Appointment:  [] Dressing Supply Provider:   [x] Home Healthcare: [facility]  [] Facility:   [x] Return Appointment: 2 Week(s)  [] Nurse Visit:  Week(s)    [] Discharge from [facility]:   [] Healed          [] Refer to Provider:           [] Consult    Follow-up Information:  [] Ordered Tests:   [] Referrals:   [] Rx:   [] Culture:  [] Wound Vac:  [] Skin Sub:   [] OR:  [] Other:     Wound Cleansing:   Do not scrub or use excessive force.  Cleanse wound prior to applying a clean dressing with:  [x] Normal Saline   [] Keep Wound Dry in Shower     [] Wound Cleanser   [] Cleanse wound with Mild Soap & Water    [] Other:       Topical Treatments:  Do not apply lotions, creams, or ointments to wound bed unless directed.   [] Apply moisturizing lotion to skin surrounding the wound prior to dressing change.  [] Apply antifungal ointment to skin surrounding the wound prior to dressing change.  [] Apply thin film of moisture barrier ointment to skin immediately around wound.  [] Apply Betadine to skin immediately around wound   [] Other:      Dressings:           Wound Location Left leg    [] Apply Primary Dressing:       [] MediHoney Gel [] MediHoney Alginate  [x] Calcium Alginate with Silver - 2nd   [] Calcium Alginate without silver   [] Collagen with silver [] Collagen without Silver    [] Santyl with moist saline gauze     [] Hydrofera Blue (cut to size and moistened with normal saline)  [] Hydrofera Blue Ready (cut to size)      [] Normal Saline wet to dry  [] Betadine wet to dry [] Betadine to kaylah wound   [] Hydrogel  [] Mepitel     [] Bactroban/Mupirocin   [] Iodoform Packing Strip [] Plain Packing Strip   []

## 2024-02-08 NOTE — DISCHARGE INSTRUCTIONS
Wound Clinic Physician Orders and Discharge Instructions  [facility]  [address], [address]  [place] [address]  Telephone: [phone]     FAX [phone]    NAME:  Shantanu Casillas  YOB: 1955  MEDICAL RECORD NUMBER:  [id number]  DATE:  2/8/2024      Return Appointment:  [] Dressing Supply Provider:   [x] Home Healthcare: [facility]  [] Facility:   [x] Return Appointment: 2 Week(s)  [] Nurse Visit:  Week(s)    [] Discharge from [facility]:   [] Healed          [] Refer to Provider:           [] Consult    Follow-up Information:  [] Ordered Tests:   [] Referrals:   [] Rx:   [] Culture:  [] Wound Vac:  [] Skin Sub:   [] OR:  [] Other:     Wound Cleansing:   Do not scrub or use excessive force.  Cleanse wound prior to applying a clean dressing with:  [x] Normal Saline   [] Keep Wound Dry in Shower     [] Wound Cleanser   [] Cleanse wound with Mild Soap & Water    [] Other:       Topical Treatments:  Do not apply lotions, creams, or ointments to wound bed unless directed.   [] Apply moisturizing lotion to skin surrounding the wound prior to dressing change.  [] Apply antifungal ointment to skin surrounding the wound prior to dressing change.  [] Apply thin film of moisture barrier ointment to skin immediately around wound.  [] Apply Betadine to skin immediately around wound   [] Other:      Dressings:           Wound Location Left leg    [] Apply Primary Dressing:       [] MediHoney Gel [] MediHoney Alginate  [x] Calcium Alginate with Silver - 2nd   [] Calcium Alginate without silver   [] Collagen with silver [] Collagen without Silver    [] Santyl with moist saline gauze     [] Hydrofera Blue (cut to size and moistened with normal saline)  [] Hydrofera Blue Ready (cut to size)      [] Normal Saline wet to dry  [] Betadine wet to dry [] Betadine to kaylah wound   [] Hydrogel  [] Mepitel     [] Bactroban/Mupirocin   [] Iodoform Packing Strip [] Plain Packing Strip   []

## 2024-02-12 ENCOUNTER — HOSPITAL ENCOUNTER (INPATIENT)
Facility: HOSPITAL | Age: 69
LOS: 6 days | Discharge: NURSING, REHAB OR LONG-TERM CARE | DRG: 193 | End: 2024-02-19
Attending: STUDENT IN AN ORGANIZED HEALTH CARE EDUCATION/TRAINING PROGRAM | Admitting: FAMILY MEDICINE
Payer: MEDICARE

## 2024-02-12 DIAGNOSIS — D72.829 LEUKOCYTOSIS, UNSPECIFIED TYPE: ICD-10-CM

## 2024-02-12 DIAGNOSIS — J18.9 PNEUMONIA OF LEFT LOWER LOBE DUE TO INFECTIOUS ORGANISM: Primary | ICD-10-CM

## 2024-02-12 PROCEDURE — 85025 COMPLETE CBC W/AUTO DIFF WBC: CPT

## 2024-02-12 PROCEDURE — 93005 ELECTROCARDIOGRAM TRACING: CPT | Performed by: STUDENT IN AN ORGANIZED HEALTH CARE EDUCATION/TRAINING PROGRAM

## 2024-02-12 PROCEDURE — 83036 HEMOGLOBIN GLYCOSYLATED A1C: CPT

## 2024-02-12 PROCEDURE — 84484 ASSAY OF TROPONIN QUANT: CPT

## 2024-02-12 PROCEDURE — 99285 EMERGENCY DEPT VISIT HI MDM: CPT

## 2024-02-12 PROCEDURE — 94761 N-INVAS EAR/PLS OXIMETRY MLT: CPT

## 2024-02-12 PROCEDURE — 36415 COLL VENOUS BLD VENIPUNCTURE: CPT

## 2024-02-12 PROCEDURE — 80053 COMPREHEN METABOLIC PANEL: CPT

## 2024-02-12 ASSESSMENT — PAIN DESCRIPTION - LOCATION: LOCATION: CHEST

## 2024-02-12 ASSESSMENT — PAIN SCALES - GENERAL: PAINLEVEL_OUTOF10: 10

## 2024-02-12 ASSESSMENT — PAIN - FUNCTIONAL ASSESSMENT: PAIN_FUNCTIONAL_ASSESSMENT: 0-10

## 2024-02-13 ENCOUNTER — APPOINTMENT (OUTPATIENT)
Facility: HOSPITAL | Age: 69
DRG: 193 | End: 2024-02-13
Payer: MEDICARE

## 2024-02-13 PROBLEM — J18.9 ACUTE PNEUMONIA: Status: ACTIVE | Noted: 2024-02-13

## 2024-02-13 PROBLEM — J16.0 CAP (COMMUNITY ACQUIRED PNEUMONIA) DUE TO CHLAMYDIA SPECIES: Status: ACTIVE | Noted: 2024-02-13

## 2024-02-13 LAB
ALBUMIN SERPL-MCNC: 2.7 G/DL (ref 3.5–5)
ALBUMIN/GLOB SERPL: 0.7 (ref 1.1–2.2)
ALP SERPL-CCNC: 79 U/L (ref 45–117)
ALT SERPL-CCNC: 28 U/L (ref 12–78)
ANION GAP SERPL CALC-SCNC: 7 MMOL/L (ref 5–15)
APPEARANCE UR: CLEAR
ARTERIAL PATENCY WRIST A: YES
AST SERPL W P-5'-P-CCNC: 32 U/L (ref 15–37)
BACTERIA URNS QL MICRO: ABNORMAL /HPF
BASE EXCESS BLDA CALC-SCNC: 3.4 MMOL/L (ref 0–3)
BASOPHILS # BLD: 0 K/UL (ref 0–0.1)
BASOPHILS NFR BLD: 0 % (ref 0–1)
BDY SITE: ABNORMAL
BILIRUB SERPL-MCNC: 0.9 MG/DL (ref 0.2–1)
BILIRUB UR QL: NEGATIVE
BODY TEMPERATURE: 98.1
BUN SERPL-MCNC: 39 MG/DL (ref 6–20)
BUN/CREAT SERPL: 20 (ref 12–20)
CA-I BLD-MCNC: 8.6 MG/DL (ref 8.5–10.1)
CHLORIDE SERPL-SCNC: 104 MMOL/L (ref 97–108)
CO2 SERPL-SCNC: 28 MMOL/L (ref 21–32)
COHGB MFR BLD: 0.8 % (ref 1–2)
COLOR UR: ABNORMAL
CREAT SERPL-MCNC: 1.91 MG/DL (ref 0.7–1.3)
DIFFERENTIAL METHOD BLD: ABNORMAL
EKG ATRIAL RATE: 60 BPM
EKG DIAGNOSIS: NORMAL
EKG P-R INTERVAL: 154 MS
EKG Q-T INTERVAL: 466 MS
EKG QRS DURATION: 114 MS
EKG QTC CALCULATION (BAZETT): 466 MS
EKG R AXIS: 129 DEGREES
EKG T AXIS: 38 DEGREES
EKG VENTRICULAR RATE: 60 BPM
EOSINOPHIL # BLD: 0.1 K/UL (ref 0–0.4)
EOSINOPHIL NFR BLD: 0 % (ref 0–7)
EPITH CASTS URNS QL MICRO: ABNORMAL /LPF
ERYTHROCYTE [DISTWIDTH] IN BLOOD BY AUTOMATED COUNT: 14.7 % (ref 11.5–14.5)
EST. AVERAGE GLUCOSE BLD GHB EST-MCNC: 154 MG/DL
FIO2 ON VENT: 28 %
GAS FLOW.O2 O2 DELIVERY SYS: 2 L/MIN
GLOBULIN SER CALC-MCNC: 4 G/DL (ref 2–4)
GLUCOSE BLD STRIP.AUTO-MCNC: 146 MG/DL (ref 65–100)
GLUCOSE BLD STRIP.AUTO-MCNC: 146 MG/DL (ref 65–100)
GLUCOSE BLD STRIP.AUTO-MCNC: 158 MG/DL (ref 65–100)
GLUCOSE SERPL-MCNC: 164 MG/DL (ref 65–100)
GLUCOSE UR STRIP.AUTO-MCNC: NEGATIVE MG/DL
HBA1C MFR BLD: 7 % (ref 4–5.6)
HCO3 BLDA-SCNC: 28 MMOL/L (ref 22–26)
HCT VFR BLD AUTO: 37.3 % (ref 36.6–50.3)
HGB BLD-MCNC: 12 G/DL (ref 12.1–17)
HGB UR QL STRIP: NEGATIVE
IMM GRANULOCYTES # BLD AUTO: 0.1 K/UL (ref 0–0.04)
IMM GRANULOCYTES NFR BLD AUTO: 1 % (ref 0–0.5)
KETONES UR QL STRIP.AUTO: NEGATIVE MG/DL
LACTATE SERPL-SCNC: 1 MMOL/L (ref 0.4–2)
LEUKOCYTE ESTERASE UR QL STRIP.AUTO: NEGATIVE
LYMPHOCYTES # BLD: 1.5 K/UL (ref 0.8–3.5)
LYMPHOCYTES NFR BLD: 11 % (ref 12–49)
MCH RBC QN AUTO: 30.4 PG (ref 26–34)
MCHC RBC AUTO-ENTMCNC: 32.2 G/DL (ref 30–36.5)
MCV RBC AUTO: 94.4 FL (ref 80–99)
METHGB MFR BLD: 0.2 % (ref 0–1.4)
MONOCYTES # BLD: 1.2 K/UL (ref 0–1)
MONOCYTES NFR BLD: 8 % (ref 5–13)
NEUTS SEG # BLD: 11.4 K/UL (ref 1.8–8)
NEUTS SEG NFR BLD: 80 % (ref 32–75)
NITRITE UR QL STRIP.AUTO: NEGATIVE
NRBC # BLD: 0 K/UL (ref 0–0.01)
NRBC BLD-RTO: 0 PER 100 WBC
OXYHGB MFR BLD: 95.2 % (ref 95–99)
PCO2 BLDA: 41 MMHG (ref 35–45)
PERFORMED BY:: ABNORMAL
PH BLDA: 7.45 (ref 7.35–7.45)
PH UR STRIP: 5 (ref 5–8)
PLATELET # BLD AUTO: 168 K/UL (ref 150–400)
PMV BLD AUTO: 11.3 FL (ref 8.9–12.9)
PO2 BLDA: 85 MMHG (ref 80–100)
POTASSIUM SERPL-SCNC: 3.9 MMOL/L (ref 3.5–5.1)
PROT SERPL-MCNC: 6.7 G/DL (ref 6.4–8.2)
PROT UR STRIP-MCNC: NEGATIVE MG/DL
RBC # BLD AUTO: 3.95 M/UL (ref 4.1–5.7)
RBC #/AREA URNS HPF: ABNORMAL /HPF (ref 0–5)
SAO2 % BLD: 96 % (ref 95–99)
SAO2% DEVICE SAO2% SENSOR NAME: ABNORMAL
SODIUM SERPL-SCNC: 139 MMOL/L (ref 136–145)
SP GR UR REFRACTOMETRY: 1.01 (ref 1–1.03)
SPECIMEN SITE: ABNORMAL
TROPONIN I SERPL HS-MCNC: 25 NG/L (ref 0–76)
TROPONIN I SERPL HS-MCNC: 28 NG/L (ref 0–76)
URINE CULTURE IF INDICATED: ABNORMAL
UROBILINOGEN UR QL STRIP.AUTO: 4 EU/DL (ref 0.1–1)
WBC # BLD AUTO: 14.2 K/UL (ref 4.1–11.1)
WBC URNS QL MICRO: ABNORMAL /HPF (ref 0–4)

## 2024-02-13 PROCEDURE — 82962 GLUCOSE BLOOD TEST: CPT

## 2024-02-13 PROCEDURE — 87040 BLOOD CULTURE FOR BACTERIA: CPT

## 2024-02-13 PROCEDURE — 6370000000 HC RX 637 (ALT 250 FOR IP): Performed by: FAMILY MEDICINE

## 2024-02-13 PROCEDURE — 1100000000 HC RM PRIVATE

## 2024-02-13 PROCEDURE — 84484 ASSAY OF TROPONIN QUANT: CPT

## 2024-02-13 PROCEDURE — 71045 X-RAY EXAM CHEST 1 VIEW: CPT

## 2024-02-13 PROCEDURE — 6370000000 HC RX 637 (ALT 250 FOR IP): Performed by: STUDENT IN AN ORGANIZED HEALTH CARE EDUCATION/TRAINING PROGRAM

## 2024-02-13 PROCEDURE — 83605 ASSAY OF LACTIC ACID: CPT

## 2024-02-13 PROCEDURE — 2700000000 HC OXYGEN THERAPY PER DAY

## 2024-02-13 PROCEDURE — 36600 WITHDRAWAL OF ARTERIAL BLOOD: CPT

## 2024-02-13 PROCEDURE — 2580000003 HC RX 258: Performed by: FAMILY MEDICINE

## 2024-02-13 PROCEDURE — 6360000002 HC RX W HCPCS: Performed by: FAMILY MEDICINE

## 2024-02-13 PROCEDURE — 6360000002 HC RX W HCPCS: Performed by: STUDENT IN AN ORGANIZED HEALTH CARE EDUCATION/TRAINING PROGRAM

## 2024-02-13 PROCEDURE — 2580000003 HC RX 258: Performed by: STUDENT IN AN ORGANIZED HEALTH CARE EDUCATION/TRAINING PROGRAM

## 2024-02-13 PROCEDURE — 82803 BLOOD GASES ANY COMBINATION: CPT

## 2024-02-13 PROCEDURE — 2500000003 HC RX 250 WO HCPCS: Performed by: STUDENT IN AN ORGANIZED HEALTH CARE EDUCATION/TRAINING PROGRAM

## 2024-02-13 PROCEDURE — 36415 COLL VENOUS BLD VENIPUNCTURE: CPT

## 2024-02-13 PROCEDURE — 96374 THER/PROPH/DIAG INJ IV PUSH: CPT

## 2024-02-13 PROCEDURE — 96375 TX/PRO/DX INJ NEW DRUG ADDON: CPT

## 2024-02-13 PROCEDURE — 94640 AIRWAY INHALATION TREATMENT: CPT

## 2024-02-13 PROCEDURE — 81001 URINALYSIS AUTO W/SCOPE: CPT

## 2024-02-13 RX ORDER — ALBUTEROL SULFATE 90 UG/1
1 AEROSOL, METERED RESPIRATORY (INHALATION) EVERY 4 HOURS PRN
Status: DISCONTINUED | OUTPATIENT
Start: 2024-02-13 | End: 2024-02-19 | Stop reason: HOSPADM

## 2024-02-13 RX ORDER — AMMONIUM LACTATE 12 G/100G
CREAM TOPICAL PRN
Status: DISCONTINUED | OUTPATIENT
Start: 2024-02-13 | End: 2024-02-19 | Stop reason: HOSPADM

## 2024-02-13 RX ORDER — POTASSIUM CHLORIDE 7.45 MG/ML
10 INJECTION INTRAVENOUS PRN
Status: DISCONTINUED | OUTPATIENT
Start: 2024-02-13 | End: 2024-02-19 | Stop reason: HOSPADM

## 2024-02-13 RX ORDER — SODIUM CHLORIDE 9 MG/ML
INJECTION, SOLUTION INTRAVENOUS PRN
Status: DISCONTINUED | OUTPATIENT
Start: 2024-02-13 | End: 2024-02-19 | Stop reason: HOSPADM

## 2024-02-13 RX ORDER — POTASSIUM CHLORIDE 20 MEQ/1
40 TABLET, EXTENDED RELEASE ORAL PRN
Status: DISCONTINUED | OUTPATIENT
Start: 2024-02-13 | End: 2024-02-19 | Stop reason: HOSPADM

## 2024-02-13 RX ORDER — IPRATROPIUM BROMIDE AND ALBUTEROL SULFATE 2.5; .5 MG/3ML; MG/3ML
1 SOLUTION RESPIRATORY (INHALATION)
Status: DISCONTINUED | OUTPATIENT
Start: 2024-02-13 | End: 2024-02-13

## 2024-02-13 RX ORDER — SODIUM CHLORIDE 0.9 % (FLUSH) 0.9 %
5-40 SYRINGE (ML) INJECTION PRN
Status: DISCONTINUED | OUTPATIENT
Start: 2024-02-13 | End: 2024-02-19 | Stop reason: HOSPADM

## 2024-02-13 RX ORDER — BUDESONIDE AND FORMOTEROL FUMARATE DIHYDRATE 160; 4.5 UG/1; UG/1
2 AEROSOL RESPIRATORY (INHALATION)
Status: DISCONTINUED | OUTPATIENT
Start: 2024-02-13 | End: 2024-02-19 | Stop reason: HOSPADM

## 2024-02-13 RX ORDER — TRAMADOL HYDROCHLORIDE 50 MG/1
50 TABLET ORAL EVERY 6 HOURS PRN
Status: DISCONTINUED | OUTPATIENT
Start: 2024-02-13 | End: 2024-02-19 | Stop reason: HOSPADM

## 2024-02-13 RX ORDER — AZITHROMYCIN 500 MG/1
500 TABLET, FILM COATED ORAL ONCE
Status: COMPLETED | OUTPATIENT
Start: 2024-02-13 | End: 2024-02-13

## 2024-02-13 RX ORDER — MAGNESIUM SULFATE IN WATER 40 MG/ML
2000 INJECTION, SOLUTION INTRAVENOUS PRN
Status: DISCONTINUED | OUTPATIENT
Start: 2024-02-13 | End: 2024-02-19 | Stop reason: HOSPADM

## 2024-02-13 RX ORDER — ATORVASTATIN CALCIUM 40 MG/1
40 TABLET, FILM COATED ORAL NIGHTLY
Status: DISCONTINUED | OUTPATIENT
Start: 2024-02-13 | End: 2024-02-19 | Stop reason: HOSPADM

## 2024-02-13 RX ORDER — ASCORBIC ACID 500 MG
1000 TABLET ORAL 2 TIMES DAILY
Status: DISCONTINUED | OUTPATIENT
Start: 2024-02-13 | End: 2024-02-19 | Stop reason: HOSPADM

## 2024-02-13 RX ORDER — ONDANSETRON 4 MG/1
4 TABLET, ORALLY DISINTEGRATING ORAL EVERY 8 HOURS PRN
Status: DISCONTINUED | OUTPATIENT
Start: 2024-02-13 | End: 2024-02-19 | Stop reason: HOSPADM

## 2024-02-13 RX ORDER — BUMETANIDE 1 MG/1
2 TABLET ORAL 2 TIMES DAILY
Status: DISCONTINUED | OUTPATIENT
Start: 2024-02-13 | End: 2024-02-17

## 2024-02-13 RX ORDER — SODIUM CHLORIDE 0.9 % (FLUSH) 0.9 %
5-40 SYRINGE (ML) INJECTION EVERY 12 HOURS SCHEDULED
Status: DISCONTINUED | OUTPATIENT
Start: 2024-02-13 | End: 2024-02-19 | Stop reason: HOSPADM

## 2024-02-13 RX ORDER — ACETAMINOPHEN 325 MG/1
650 TABLET ORAL EVERY 6 HOURS PRN
Status: DISCONTINUED | OUTPATIENT
Start: 2024-02-13 | End: 2024-02-19 | Stop reason: HOSPADM

## 2024-02-13 RX ORDER — INSULIN GLARGINE 100 [IU]/ML
34 INJECTION, SOLUTION SUBCUTANEOUS NIGHTLY
Status: DISCONTINUED | OUTPATIENT
Start: 2024-02-13 | End: 2024-02-19 | Stop reason: HOSPADM

## 2024-02-13 RX ORDER — DEXTROSE MONOHYDRATE 100 MG/ML
INJECTION, SOLUTION INTRAVENOUS CONTINUOUS PRN
Status: DISCONTINUED | OUTPATIENT
Start: 2024-02-13 | End: 2024-02-19 | Stop reason: HOSPADM

## 2024-02-13 RX ORDER — OXYCODONE HYDROCHLORIDE 5 MG/1
5 TABLET ORAL 2 TIMES DAILY
Status: DISCONTINUED | OUTPATIENT
Start: 2024-02-13 | End: 2024-02-16

## 2024-02-13 RX ORDER — TAMSULOSIN HYDROCHLORIDE 0.4 MG/1
0.8 CAPSULE ORAL DAILY
Status: ON HOLD | COMMUNITY
Start: 2023-12-21 | End: 2024-02-19 | Stop reason: HOSPADM

## 2024-02-13 RX ORDER — GLUCAGON 1 MG/ML
1 KIT INJECTION PRN
Status: DISCONTINUED | OUTPATIENT
Start: 2024-02-13 | End: 2024-02-19 | Stop reason: HOSPADM

## 2024-02-13 RX ORDER — ASPIRIN 81 MG/1
81 TABLET, CHEWABLE ORAL DAILY
Status: DISCONTINUED | OUTPATIENT
Start: 2024-02-13 | End: 2024-02-19 | Stop reason: HOSPADM

## 2024-02-13 RX ORDER — ONDANSETRON 2 MG/ML
4 INJECTION INTRAMUSCULAR; INTRAVENOUS EVERY 6 HOURS PRN
Status: DISCONTINUED | OUTPATIENT
Start: 2024-02-13 | End: 2024-02-19 | Stop reason: HOSPADM

## 2024-02-13 RX ORDER — INSULIN LISPRO 100 [IU]/ML
0-4 INJECTION, SOLUTION INTRAVENOUS; SUBCUTANEOUS NIGHTLY
Status: DISCONTINUED | OUTPATIENT
Start: 2024-02-13 | End: 2024-02-19 | Stop reason: HOSPADM

## 2024-02-13 RX ORDER — GABAPENTIN 300 MG/1
300 CAPSULE ORAL 4 TIMES DAILY
Status: DISCONTINUED | OUTPATIENT
Start: 2024-02-13 | End: 2024-02-19 | Stop reason: HOSPADM

## 2024-02-13 RX ORDER — FLUTICASONE PROPIONATE 50 MCG
2 SPRAY, SUSPENSION (ML) NASAL DAILY
Status: DISCONTINUED | OUTPATIENT
Start: 2024-02-13 | End: 2024-02-19 | Stop reason: HOSPADM

## 2024-02-13 RX ORDER — ACETAMINOPHEN 650 MG/1
650 SUPPOSITORY RECTAL EVERY 6 HOURS PRN
Status: DISCONTINUED | OUTPATIENT
Start: 2024-02-13 | End: 2024-02-19 | Stop reason: HOSPADM

## 2024-02-13 RX ORDER — IPRATROPIUM BROMIDE AND ALBUTEROL SULFATE 2.5; .5 MG/3ML; MG/3ML
1 SOLUTION RESPIRATORY (INHALATION)
Status: DISCONTINUED | OUTPATIENT
Start: 2024-02-13 | End: 2024-02-13 | Stop reason: SDUPTHER

## 2024-02-13 RX ORDER — METOLAZONE 5 MG/1
5 TABLET ORAL DAILY
Status: DISCONTINUED | OUTPATIENT
Start: 2024-02-13 | End: 2024-02-19 | Stop reason: HOSPADM

## 2024-02-13 RX ORDER — VITAMIN B COMPLEX
1 CAPSULE ORAL DAILY
Status: DISCONTINUED | OUTPATIENT
Start: 2024-02-13 | End: 2024-02-19 | Stop reason: HOSPADM

## 2024-02-13 RX ORDER — ENOXAPARIN SODIUM 100 MG/ML
40 INJECTION SUBCUTANEOUS 2 TIMES DAILY
Status: DISCONTINUED | OUTPATIENT
Start: 2024-02-13 | End: 2024-02-17

## 2024-02-13 RX ORDER — INSULIN LISPRO 100 [IU]/ML
0-16 INJECTION, SOLUTION INTRAVENOUS; SUBCUTANEOUS
Status: DISCONTINUED | OUTPATIENT
Start: 2024-02-13 | End: 2024-02-19 | Stop reason: HOSPADM

## 2024-02-13 RX ORDER — COLCHICINE 0.6 MG/1
0.6 TABLET ORAL DAILY
Status: DISCONTINUED | OUTPATIENT
Start: 2024-02-13 | End: 2024-02-19 | Stop reason: HOSPADM

## 2024-02-13 RX ORDER — ALLOPURINOL 100 MG/1
200 TABLET ORAL DAILY
Status: DISCONTINUED | OUTPATIENT
Start: 2024-02-13 | End: 2024-02-19 | Stop reason: HOSPADM

## 2024-02-13 RX ORDER — HYDROMORPHONE HYDROCHLORIDE 1 MG/ML
0.5 INJECTION, SOLUTION INTRAMUSCULAR; INTRAVENOUS; SUBCUTANEOUS
Status: COMPLETED | OUTPATIENT
Start: 2024-02-13 | End: 2024-02-13

## 2024-02-13 RX ORDER — VITAMIN E 268 MG
800 CAPSULE ORAL 2 TIMES DAILY
Status: DISCONTINUED | OUTPATIENT
Start: 2024-02-13 | End: 2024-02-19 | Stop reason: HOSPADM

## 2024-02-13 RX ORDER — POLYETHYLENE GLYCOL 3350 17 G/17G
17 POWDER, FOR SOLUTION ORAL DAILY PRN
Status: DISCONTINUED | OUTPATIENT
Start: 2024-02-13 | End: 2024-02-19 | Stop reason: HOSPADM

## 2024-02-13 RX ORDER — IPRATROPIUM BROMIDE AND ALBUTEROL SULFATE 2.5; .5 MG/3ML; MG/3ML
1 SOLUTION RESPIRATORY (INHALATION)
Status: DISCONTINUED | OUTPATIENT
Start: 2024-02-13 | End: 2024-02-16

## 2024-02-13 RX ORDER — POTASSIUM CHLORIDE 750 MG/1
10 TABLET, FILM COATED, EXTENDED RELEASE ORAL 4 TIMES DAILY
Status: DISCONTINUED | OUTPATIENT
Start: 2024-02-13 | End: 2024-02-19 | Stop reason: HOSPADM

## 2024-02-13 RX ADMIN — Medication 1 CAPSULE: at 11:40

## 2024-02-13 RX ADMIN — OXYCODONE 5 MG: 5 TABLET ORAL at 21:23

## 2024-02-13 RX ADMIN — IPRATROPIUM BROMIDE AND ALBUTEROL SULFATE 1 DOSE: 2.5; .5 SOLUTION RESPIRATORY (INHALATION) at 21:48

## 2024-02-13 RX ADMIN — GABAPENTIN 300 MG: 300 CAPSULE ORAL at 11:42

## 2024-02-13 RX ADMIN — ENOXAPARIN SODIUM 40 MG: 100 INJECTION SUBCUTANEOUS at 21:23

## 2024-02-13 RX ADMIN — IPRATROPIUM BROMIDE AND ALBUTEROL SULFATE 1 DOSE: 2.5; .5 SOLUTION RESPIRATORY (INHALATION) at 13:59

## 2024-02-13 RX ADMIN — Medication 800 UNITS: at 21:23

## 2024-02-13 RX ADMIN — HYDROMORPHONE HYDROCHLORIDE 0.5 MG: 1 INJECTION, SOLUTION INTRAMUSCULAR; INTRAVENOUS; SUBCUTANEOUS at 00:55

## 2024-02-13 RX ADMIN — ACETAMINOPHEN 650 MG: 325 TABLET ORAL at 18:54

## 2024-02-13 RX ADMIN — FLUTICASONE PROPIONATE 2 SPRAY: 50 SPRAY, METERED NASAL at 11:44

## 2024-02-13 RX ADMIN — POTASSIUM CHLORIDE 10 MEQ: 750 TABLET, EXTENDED RELEASE ORAL at 11:40

## 2024-02-13 RX ADMIN — CEFTRIAXONE SODIUM 2000 MG: 2 INJECTION, POWDER, FOR SOLUTION INTRAMUSCULAR; INTRAVENOUS at 00:53

## 2024-02-13 RX ADMIN — Medication 2 PUFF: at 21:48

## 2024-02-13 RX ADMIN — BUMETANIDE 2 MG: 1 TABLET ORAL at 11:42

## 2024-02-13 RX ADMIN — GABAPENTIN 300 MG: 300 CAPSULE ORAL at 21:23

## 2024-02-13 RX ADMIN — OXYCODONE HYDROCHLORIDE AND ACETAMINOPHEN 1000 MG: 500 TABLET ORAL at 11:41

## 2024-02-13 RX ADMIN — COLCHICINE 0.6 MG: 0.6 TABLET, FILM COATED ORAL at 11:43

## 2024-02-13 RX ADMIN — GABAPENTIN 300 MG: 300 CAPSULE ORAL at 18:54

## 2024-02-13 RX ADMIN — ENOXAPARIN SODIUM 40 MG: 100 INJECTION SUBCUTANEOUS at 11:44

## 2024-02-13 RX ADMIN — ASPIRIN 81 MG 81 MG: 81 TABLET ORAL at 11:40

## 2024-02-13 RX ADMIN — ALLOPURINOL 200 MG: 100 TABLET ORAL at 11:41

## 2024-02-13 RX ADMIN — OXYCODONE HYDROCHLORIDE AND ACETAMINOPHEN 1000 MG: 500 TABLET ORAL at 21:23

## 2024-02-13 RX ADMIN — ATORVASTATIN CALCIUM 40 MG: 40 TABLET, FILM COATED ORAL at 21:23

## 2024-02-13 RX ADMIN — SACUBITRIL AND VALSARTAN 1 TABLET: 24; 26 TABLET, FILM COATED ORAL at 11:43

## 2024-02-13 RX ADMIN — TRAMADOL HYDROCHLORIDE 50 MG: 50 TABLET, COATED ORAL at 04:59

## 2024-02-13 RX ADMIN — TRAMADOL HYDROCHLORIDE 50 MG: 50 TABLET, COATED ORAL at 11:41

## 2024-02-13 RX ADMIN — SODIUM CHLORIDE, PRESERVATIVE FREE 10 ML: 5 INJECTION INTRAVENOUS at 11:48

## 2024-02-13 RX ADMIN — METOLAZONE 5 MG: 5 TABLET ORAL at 11:43

## 2024-02-13 RX ADMIN — OXYCODONE 5 MG: 5 TABLET ORAL at 11:41

## 2024-02-13 RX ADMIN — SODIUM CHLORIDE, PRESERVATIVE FREE 10 ML: 5 INJECTION INTRAVENOUS at 21:23

## 2024-02-13 RX ADMIN — AZITHROMYCIN DIHYDRATE 500 MG: 500 TABLET ORAL at 00:54

## 2024-02-13 RX ADMIN — INSULIN GLARGINE 34 UNITS: 100 INJECTION, SOLUTION SUBCUTANEOUS at 21:01

## 2024-02-13 ASSESSMENT — PAIN DESCRIPTION - LOCATION
LOCATION: ARM
LOCATION: BACK;CHEST

## 2024-02-13 ASSESSMENT — PAIN DESCRIPTION - ORIENTATION
ORIENTATION: RIGHT
ORIENTATION: RIGHT;POSTERIOR;LEFT

## 2024-02-13 ASSESSMENT — PAIN SCALES - GENERAL
PAINLEVEL_OUTOF10: 6
PAINLEVEL_OUTOF10: 10
PAINLEVEL_OUTOF10: 8
PAINLEVEL_OUTOF10: 10
PAINLEVEL_OUTOF10: 9
PAINLEVEL_OUTOF10: 8
PAINLEVEL_OUTOF10: 7
PAINLEVEL_OUTOF10: 10
PAINLEVEL_OUTOF10: 7
PAINLEVEL_OUTOF10: 10

## 2024-02-13 ASSESSMENT — PAIN SCALES - WONG BAKER
WONGBAKER_NUMERICALRESPONSE: 10
WONGBAKER_NUMERICALRESPONSE: 8

## 2024-02-13 ASSESSMENT — LIFESTYLE VARIABLES
HOW OFTEN DO YOU HAVE A DRINK CONTAINING ALCOHOL: NEVER
HOW MANY STANDARD DRINKS CONTAINING ALCOHOL DO YOU HAVE ON A TYPICAL DAY: PATIENT DOES NOT DRINK

## 2024-02-13 ASSESSMENT — PAIN DESCRIPTION - DESCRIPTORS: DESCRIPTORS: ACHING;SHARP

## 2024-02-13 NOTE — H&P
History and Physical    NAME:   Shantanu Casillas   :  1955   MRN:  [id number]     Date/Time: 2024 8:03 AM    Patient PCP: Ed Pino MD  ______________________________________________________________________       Subjective:     CHIEF COMPLAINT:   Chest and back pain with breathing with fever and cough    HISTORY OF PRESENT ILLNESS:     General Daily Progress Note  Patient is a 68 y.o. year old male with PMH of CAD with AICD, COPD on 3L of oxygen at home, DM, HTN, gout, peripheral neuropathy, and left leg ulceration seen by wound care weekly, who presented with complaints of chest and back pain with breathing accompanied by fever and cough. He started feeling ill 3 days ago. This was accompanied by a fever of 101, which he stated was high for him because his baseline temperature is under 97. The patient sleeps with a [employer] machine. Has not eaten in 3 days and has uncontrolled diarrhea. Used pepto-bismol yesterday.    Troponin was unremarkable.    Family history includes mother with DM. Osteoporosis, COPD; father  from heart disease in his 70s.    Patient denies smoking, drug use, or recent drinking. Was social drinker earlier in life. 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    CXR: Left basilar airspace disease may represent atelectasis or  pneumonia.    WBCs 14.2, Cr 1.91    Past Medical History:   Diagnosis Date    Arthritis     CAD (coronary artery disease)     Chronic obstructive pulmonary disease (HCC)     Diabetes (HCC)     Gout     Hypertension     Ill-defined condition     Sleep apnea     cpap        Past Surgical History:   Procedure Laterality Date    FOOT DEBRIDEMENT Right 12/15/2023    INCISION AND DRAINAGE RIGHT FIRST METATARSAL PHALANGEAL JOINT performed by Polo Oviedo DPM at [facility] MA[facility] OR    FOOT SURGERY      right heel - foriegn object    HX VEIN ABLATION ADHESIVE      ORTHOPEDIC SURGERY      back 
1.91 (H) 0.70 - 1.30 mg/dL    Bun/Cre Ratio 20 12 - 20      Est, Glom Filt Rate 37 (L) >60 ml/min/1.73m2    Calcium 8.6 8.5 - 10.1 mg/dL    Total Bilirubin 0.9 0.2 - 1.0 mg/dL    AST 32 15 - 37 U/L    ALT 28 12 - 78 U/L    Alk Phosphatase 79 45 - 117 U/L    Total Protein 6.7 6.4 - 8.2 g/dL    Albumin 2.7 (L) 3.5 - 5.0 g/dL    Globulin 4.0 2.0 - 4.0 g/dL    Albumin/Globulin Ratio 0.7 (L) 1.1 - 2.2     Troponin    Collection Time: 02/12/24 11:46 PM   Result Value Ref Range    Troponin, High Sensitivity 28 0 - 76 ng/L   Troponin    Collection Time: 02/13/24 12:59 AM   Result Value Ref Range    Troponin, High Sensitivity 25 0 - 76 ng/L           Xray Result (most recent):  XR CHEST 1 VIEW    Result Date: 2/13/2024  Left basilar airspace disease may represent atelectasis or pneumonia.        XR CHEST 1 VIEW   Final Result   Left basilar airspace disease may represent atelectasis or   pneumonia.              Review of Systems:  Constitutional: Negative for chills and fever.   HENT: Negative.    Eyes: Negative.    Respiratory: Shortness of breath   cardiovascular: Negative.    Gastrointestinal: Negative for abdominal pain and nausea.   Skin: Negative.    Neurological: Negative.      Objective:   VITALS:    Vitals:    02/13/24 0909   BP:    Pulse: (!) 104   Resp: 12   Temp:    SpO2: 97%       Physical Exam:   Constitutional: pt is oriented to person, place, and time.   HENT:   Head: Normocephalic and atraumatic.   Eyes: Pupils are equal, round, and reactive to light. EOM are normal.   Cardiovascular: Normal rate, regular rhythm and normal heart sounds.   Pulmonary/Chest: Breath sounds normal. No wheezes. No rales.   Exhibits no tenderness.   Abdominal: Soft. Bowel sounds are normal. There is no abdominal tenderness. There is no rebound and no guarding.   Musculoskeletal: Normal range of motion.   Neurological: pt is alert and oriented to person, place, and time. Alert. Normal strength. No cranial nerve deficit or sensory

## 2024-02-13 NOTE — ED PROVIDER NOTES
[facility] EMERGENCY DEPT  EMERGENCY DEPARTMENT HISTORY AND PHYSICAL EXAM      Date: 2/12/2024  Patient Name: Shantanu Casillas  MRN: [id number]  Birthdate 1955  Date of evaluation: 2/12/2024  Provider: Tai Linares MD   Note Started: 11:42 PM EST 2/12/24    HISTORY OF PRESENT ILLNESS     Chief Complaint   Patient presents with    Chest Pain       History Provided By: Patient    HPI: Shatnanu Casillas is a 68 y.o. male history including CAD, COPD, diabetes, hypertension, presents with chest and back pain, cough, and fevers.  Temperature is increased to 100, 101 °F at home.  Patient reports his chest pain is sharp and worse on inspiration.  He has also had loose stools.  He wears 3 L oxygen at home, has not required increased amount.    PAST MEDICAL HISTORY   Past Medical History:  Past Medical History:   Diagnosis Date    Arthritis     CAD (coronary artery disease)     Chronic obstructive pulmonary disease (HCC)     Diabetes (HCC)     Gout     Hypertension     Ill-defined condition     Sleep apnea     cpap       Past Surgical History:  Past Surgical History:   Procedure Laterality Date    FOOT DEBRIDEMENT Right 12/15/2023    INCISION AND DRAINAGE RIGHT FIRST METATARSAL PHALANGEAL JOINT performed by Polo Oviedo DPM at [facility] MAIN OR    FOOT SURGERY      right heel - foriegn object    HX VEIN ABLATION ADHESIVE      ORTHOPEDIC SURGERY      back surgery    PACEMAKER      aicd -       Family History:  Family History   Problem Relation Age of Onset    Diabetes Mother     Heart Disease Father     Hypertension Father     Diabetes Sister     Diabetes Brother     Hypertension Mother     Heart Disease Mother     Kidney Disease Mother        Social History:  Social History     Tobacco Use    Smoking status: Never    Smokeless tobacco: Never   Substance Use Topics    Alcohol use: Not Currently    Drug use: Never       Allergies:  Allergies   Allergen Reactions    Amitriptyline Angioedema    Naproxen      Other reaction(s):

## 2024-02-13 NOTE — ED NOTES
Incontinence care provided to patient at this time. Brief removed, sheets changed. Urinal at bedside.

## 2024-02-13 NOTE — ED NOTES
Rounded on patient. Patient laying on ER stretcher, resting comfortably, watching tv. Pt connected to cardiac monitor, BP, and pulse ox. Bed in lowest position, locked, with both side rails up. Call bell within reach. No needs at this time.

## 2024-02-13 NOTE — RT PROTOCOL NOTE
RT Inhaler-Nebulizer Bronchodilator Protocol Note    There is a bronchodilator order in the chart from a provider indicating to follow the RT Bronchodilator Protocol and there is an “Initiate RT Inhaler-Nebulizer Bronchodilator Protocol” order as well (see protocol at bottom of note).    CXR Findings:  No results found.    The findings from the last RT Protocol Assessment were as follows:   History Pulmonary Disease: None or smoker <15 pack years  Respiratory Pattern: Regular pattern and RR 12-20 bpm  Breath Sounds: Slightly diminished and/or crackles  Cough: Strong, spontaneous, non-productive  Indication for Bronchodilator Therapy: On home bronchodilators  Bronchodilator Assessment Score: 2    Aerosolized bronchodilator medication orders have been revised according to the RT Inhaler-Nebulizer Bronchodilator Protocol below.    Respiratory Therapist to perform RT Therapy Protocol Assessment initially then follow the protocol.  Repeat RT Therapy Protocol Assessment PRN for score 0-3 or on second treatment, BID, and PRN for scores above 3.    No Indications - adjust the frequency to every 6 hours PRN wheezing or bronchospasm, if no treatments needed after 48 hours then discontinue using Per Protocol order mode.     If indication present, adjust the RT bronchodilator orders based on the Bronchodilator Assessment Score as indicated below.  Use Inhaler orders unless patient has one or more of the following: on home nebulizer, not able to hold breath for 10 seconds, is not alert and oriented, cannot activate and use MDI correctly, or respiratory rate 25 breaths per minute or more, then use the equivalent nebulizer order(s) with same Frequency and PRN reasons based on the score.  If a patient is on this medication at home then do not decrease Frequency below that used at home.    0-3 - enter or revise RT bronchodilator order(s) to equivalent RT Bronchodilator order with Frequency of every 4 hours PRN for wheezing or

## 2024-02-13 NOTE — ED NOTES
Pt had Bowel Movement, pt cleaned by rn and Lpn at this time, new chux placed, and new male external catheter placed.

## 2024-02-13 NOTE — ED NOTES
TRANSFER - OUT REPORT:    Verbal report given to Tiffani Recinos on Shantanu Casillas  being transferred to [address] [place] [address]  for routine progression of patient care       Report consisted of patient's Situation, Background, Assessment and   Recommendations(SBAR).     Information from the following report(s) Nurse Handoff Report, ED Encounter Summary, ED SBAR, and MAR was reviewed with the receiving nurse.    [place] Fall Assessment:    Presents to emergency department  because of falls (Syncope, seizure, or loss of consciousness): No  Age > 70: No  Altered Mental Status, Intoxication with alcohol or substance confusion (Disorientation, impaired judgment, poor safety awaremess, or inability to follow instructions): No  Impaired Mobility: Ambulates or transfers with assistive devices or assistance; Unable to ambulate or transer.: Yes  Nursing Judgement: Yes          Lines:   Peripheral IV 02/13/24 Left Antecubital (Active)        Opportunity for questions and clarification was provided.      Patient transported with:  Monitor, O2 @ 2lpm, and Tech

## 2024-02-14 ENCOUNTER — APPOINTMENT (OUTPATIENT)
Facility: HOSPITAL | Age: 69
DRG: 193 | End: 2024-02-14
Payer: MEDICARE

## 2024-02-14 LAB
ALBUMIN SERPL-MCNC: 2.3 G/DL (ref 3.5–5)
ALBUMIN SERPL-MCNC: 2.4 G/DL (ref 3.5–5)
ALBUMIN/GLOB SERPL: 0.5 (ref 1.1–2.2)
ALP SERPL-CCNC: 84 U/L (ref 45–117)
ALT SERPL-CCNC: 33 U/L (ref 12–78)
ANION GAP SERPL CALC-SCNC: 7 MMOL/L (ref 5–15)
ANION GAP SERPL CALC-SCNC: 7 MMOL/L (ref 5–15)
AST SERPL W P-5'-P-CCNC: 38 U/L (ref 15–37)
BASOPHILS # BLD: 0 K/UL (ref 0–0.1)
BASOPHILS NFR BLD: 0 % (ref 0–1)
BILIRUB SERPL-MCNC: 1.3 MG/DL (ref 0.2–1)
BUN SERPL-MCNC: 28 MG/DL (ref 6–20)
BUN SERPL-MCNC: 28 MG/DL (ref 6–20)
BUN/CREAT SERPL: 18 (ref 12–20)
BUN/CREAT SERPL: 18 (ref 12–20)
CA-I BLD-MCNC: 8.5 MG/DL (ref 8.5–10.1)
CA-I BLD-MCNC: 8.5 MG/DL (ref 8.5–10.1)
CHLORIDE SERPL-SCNC: 101 MMOL/L (ref 97–108)
CHLORIDE SERPL-SCNC: 99 MMOL/L (ref 97–108)
CK SERPL-CCNC: 231 U/L (ref 39–308)
CO2 SERPL-SCNC: 29 MMOL/L (ref 21–32)
CO2 SERPL-SCNC: 30 MMOL/L (ref 21–32)
CREAT SERPL-MCNC: 1.57 MG/DL (ref 0.7–1.3)
CREAT SERPL-MCNC: 1.58 MG/DL (ref 0.7–1.3)
DIFFERENTIAL METHOD BLD: ABNORMAL
EOSINOPHIL # BLD: 0.1 K/UL (ref 0–0.4)
EOSINOPHIL NFR BLD: 1 % (ref 0–7)
ERYTHROCYTE [DISTWIDTH] IN BLOOD BY AUTOMATED COUNT: 14.4 % (ref 11.5–14.5)
GLOBULIN SER CALC-MCNC: 4.5 G/DL (ref 2–4)
GLUCOSE BLD STRIP.AUTO-MCNC: 137 MG/DL (ref 65–100)
GLUCOSE BLD STRIP.AUTO-MCNC: 161 MG/DL (ref 65–100)
GLUCOSE BLD STRIP.AUTO-MCNC: 161 MG/DL (ref 65–100)
GLUCOSE BLD STRIP.AUTO-MCNC: 174 MG/DL (ref 65–100)
GLUCOSE SERPL-MCNC: 186 MG/DL (ref 65–100)
GLUCOSE SERPL-MCNC: 193 MG/DL (ref 65–100)
HCT VFR BLD AUTO: 31.6 % (ref 36.6–50.3)
HGB BLD-MCNC: 10.1 G/DL (ref 12.1–17)
IMM GRANULOCYTES # BLD AUTO: 0.1 K/UL (ref 0–0.04)
IMM GRANULOCYTES NFR BLD AUTO: 1 % (ref 0–0.5)
LYMPHOCYTES # BLD: 1.3 K/UL (ref 0.8–3.5)
LYMPHOCYTES NFR BLD: 11 % (ref 12–49)
MCH RBC QN AUTO: 30.4 PG (ref 26–34)
MCHC RBC AUTO-ENTMCNC: 32 G/DL (ref 30–36.5)
MCV RBC AUTO: 95.2 FL (ref 80–99)
MONOCYTES # BLD: 1.1 K/UL (ref 0–1)
MONOCYTES NFR BLD: 9 % (ref 5–13)
NEUTS SEG # BLD: 9.3 K/UL (ref 1.8–8)
NEUTS SEG NFR BLD: 78 % (ref 32–75)
NRBC # BLD: 0 K/UL (ref 0–0.01)
NRBC BLD-RTO: 0 PER 100 WBC
PERFORMED BY:: ABNORMAL
PHOSPHATE SERPL-MCNC: 3.3 MG/DL (ref 2.6–4.7)
PLATELET # BLD AUTO: 193 K/UL (ref 150–400)
PMV BLD AUTO: 11.2 FL (ref 8.9–12.9)
POTASSIUM SERPL-SCNC: 3 MMOL/L (ref 3.5–5.1)
POTASSIUM SERPL-SCNC: 3 MMOL/L (ref 3.5–5.1)
PROT SERPL-MCNC: 6.9 G/DL (ref 6.4–8.2)
RBC # BLD AUTO: 3.32 M/UL (ref 4.1–5.7)
SODIUM SERPL-SCNC: 136 MMOL/L (ref 136–145)
SODIUM SERPL-SCNC: 137 MMOL/L (ref 136–145)
WBC # BLD AUTO: 12 K/UL (ref 4.1–11.1)

## 2024-02-14 PROCEDURE — 2580000003 HC RX 258: Performed by: FAMILY MEDICINE

## 2024-02-14 PROCEDURE — 80069 RENAL FUNCTION PANEL: CPT

## 2024-02-14 PROCEDURE — 85025 COMPLETE CBC W/AUTO DIFF WBC: CPT

## 2024-02-14 PROCEDURE — 6360000002 HC RX W HCPCS: Performed by: FAMILY MEDICINE

## 2024-02-14 PROCEDURE — 6360000002 HC RX W HCPCS

## 2024-02-14 PROCEDURE — 36415 COLL VENOUS BLD VENIPUNCTURE: CPT

## 2024-02-14 PROCEDURE — 94640 AIRWAY INHALATION TREATMENT: CPT

## 2024-02-14 PROCEDURE — 2500000003 HC RX 250 WO HCPCS: Performed by: FAMILY MEDICINE

## 2024-02-14 PROCEDURE — 6370000000 HC RX 637 (ALT 250 FOR IP): Performed by: FAMILY MEDICINE

## 2024-02-14 PROCEDURE — 2700000000 HC OXYGEN THERAPY PER DAY

## 2024-02-14 PROCEDURE — 5A09457 ASSISTANCE WITH RESPIRATORY VENTILATION, 24-96 CONSECUTIVE HOURS, CONTINUOUS POSITIVE AIRWAY PRESSURE: ICD-10-PCS | Performed by: FAMILY MEDICINE

## 2024-02-14 PROCEDURE — 72040 X-RAY EXAM NECK SPINE 2-3 VW: CPT

## 2024-02-14 PROCEDURE — 1100000000 HC RM PRIVATE

## 2024-02-14 PROCEDURE — 94761 N-INVAS EAR/PLS OXIMETRY MLT: CPT

## 2024-02-14 PROCEDURE — 6370000000 HC RX 637 (ALT 250 FOR IP)

## 2024-02-14 PROCEDURE — 72100 X-RAY EXAM L-S SPINE 2/3 VWS: CPT

## 2024-02-14 PROCEDURE — 72072 X-RAY EXAM THORAC SPINE 3VWS: CPT

## 2024-02-14 PROCEDURE — 82550 ASSAY OF CK (CPK): CPT

## 2024-02-14 PROCEDURE — 80053 COMPREHEN METABOLIC PANEL: CPT

## 2024-02-14 PROCEDURE — 82962 GLUCOSE BLOOD TEST: CPT

## 2024-02-14 RX ORDER — POTASSIUM CHLORIDE 20 MEQ/1
40 TABLET, EXTENDED RELEASE ORAL ONCE
Status: COMPLETED | OUTPATIENT
Start: 2024-02-14 | End: 2024-02-14

## 2024-02-14 RX ORDER — POTASSIUM CHLORIDE 7.45 MG/ML
10 INJECTION INTRAVENOUS
Status: COMPLETED | OUTPATIENT
Start: 2024-02-14 | End: 2024-02-14

## 2024-02-14 RX ADMIN — GABAPENTIN 300 MG: 300 CAPSULE ORAL at 11:38

## 2024-02-14 RX ADMIN — ENOXAPARIN SODIUM 40 MG: 100 INJECTION SUBCUTANEOUS at 08:56

## 2024-02-14 RX ADMIN — MICONAZOLE NITRATE: 20 POWDER TOPICAL at 17:07

## 2024-02-14 RX ADMIN — ALLOPURINOL 200 MG: 100 TABLET ORAL at 08:57

## 2024-02-14 RX ADMIN — GABAPENTIN 300 MG: 300 CAPSULE ORAL at 08:57

## 2024-02-14 RX ADMIN — OXYCODONE HYDROCHLORIDE AND ACETAMINOPHEN 1000 MG: 500 TABLET ORAL at 20:55

## 2024-02-14 RX ADMIN — OXYCODONE 5 MG: 5 TABLET ORAL at 20:53

## 2024-02-14 RX ADMIN — IPRATROPIUM BROMIDE AND ALBUTEROL SULFATE 1 DOSE: 2.5; .5 SOLUTION RESPIRATORY (INHALATION) at 01:06

## 2024-02-14 RX ADMIN — CEFTRIAXONE SODIUM 1000 MG: 1 INJECTION, POWDER, FOR SOLUTION INTRAMUSCULAR; INTRAVENOUS at 00:26

## 2024-02-14 RX ADMIN — OXYCODONE HYDROCHLORIDE AND ACETAMINOPHEN 1000 MG: 500 TABLET ORAL at 08:56

## 2024-02-14 RX ADMIN — ASPIRIN 81 MG 81 MG: 81 TABLET ORAL at 08:57

## 2024-02-14 RX ADMIN — POTASSIUM CHLORIDE 40 MEQ: 1500 TABLET, EXTENDED RELEASE ORAL at 16:18

## 2024-02-14 RX ADMIN — ACETAMINOPHEN 650 MG: 325 TABLET ORAL at 07:57

## 2024-02-14 RX ADMIN — GABAPENTIN 300 MG: 300 CAPSULE ORAL at 16:17

## 2024-02-14 RX ADMIN — ATORVASTATIN CALCIUM 40 MG: 40 TABLET, FILM COATED ORAL at 20:53

## 2024-02-14 RX ADMIN — TRAMADOL HYDROCHLORIDE 50 MG: 50 TABLET, COATED ORAL at 16:17

## 2024-02-14 RX ADMIN — COLCHICINE 0.6 MG: 0.6 TABLET, FILM COATED ORAL at 08:57

## 2024-02-14 RX ADMIN — POTASSIUM CHLORIDE 10 MEQ: 10 INJECTION, SOLUTION INTRAVENOUS at 16:18

## 2024-02-14 RX ADMIN — POTASSIUM CHLORIDE 10 MEQ: 750 TABLET, EXTENDED RELEASE ORAL at 16:18

## 2024-02-14 RX ADMIN — MICONAZOLE NITRATE: 20 POWDER TOPICAL at 20:58

## 2024-02-14 RX ADMIN — SODIUM CHLORIDE, PRESERVATIVE FREE 10 ML: 5 INJECTION INTRAVENOUS at 08:57

## 2024-02-14 RX ADMIN — ACETAMINOPHEN 650 MG: 325 TABLET ORAL at 23:50

## 2024-02-14 RX ADMIN — AZITHROMYCIN MONOHYDRATE 500 MG: 500 INJECTION, POWDER, LYOPHILIZED, FOR SOLUTION INTRAVENOUS at 00:26

## 2024-02-14 RX ADMIN — Medication 2 PUFF: at 20:05

## 2024-02-14 RX ADMIN — Medication 800 UNITS: at 20:52

## 2024-02-14 RX ADMIN — POTASSIUM CHLORIDE 10 MEQ: 750 TABLET, EXTENDED RELEASE ORAL at 20:52

## 2024-02-14 RX ADMIN — POTASSIUM CHLORIDE 10 MEQ: 10 INJECTION, SOLUTION INTRAVENOUS at 20:55

## 2024-02-14 RX ADMIN — FLUTICASONE PROPIONATE 2 SPRAY: 50 SPRAY, METERED NASAL at 08:58

## 2024-02-14 RX ADMIN — OXYCODONE 5 MG: 5 TABLET ORAL at 08:57

## 2024-02-14 RX ADMIN — CEFTRIAXONE SODIUM 1000 MG: 1 INJECTION, POWDER, FOR SOLUTION INTRAMUSCULAR; INTRAVENOUS at 23:38

## 2024-02-14 RX ADMIN — IPRATROPIUM BROMIDE AND ALBUTEROL SULFATE 1 DOSE: 2.5; .5 SOLUTION RESPIRATORY (INHALATION) at 14:17

## 2024-02-14 RX ADMIN — Medication 800 UNITS: at 08:56

## 2024-02-14 RX ADMIN — ENOXAPARIN SODIUM 40 MG: 100 INJECTION SUBCUTANEOUS at 21:00

## 2024-02-14 RX ADMIN — GABAPENTIN 300 MG: 300 CAPSULE ORAL at 20:52

## 2024-02-14 RX ADMIN — INSULIN GLARGINE 34 UNITS: 100 INJECTION, SOLUTION SUBCUTANEOUS at 21:00

## 2024-02-14 RX ADMIN — AZITHROMYCIN MONOHYDRATE 500 MG: 500 INJECTION, POWDER, LYOPHILIZED, FOR SOLUTION INTRAVENOUS at 23:18

## 2024-02-14 RX ADMIN — POTASSIUM CHLORIDE 10 MEQ: 10 INJECTION, SOLUTION INTRAVENOUS at 17:09

## 2024-02-14 RX ADMIN — SODIUM CHLORIDE, PRESERVATIVE FREE 10 ML: 5 INJECTION INTRAVENOUS at 20:56

## 2024-02-14 RX ADMIN — POTASSIUM CHLORIDE 10 MEQ: 10 INJECTION, SOLUTION INTRAVENOUS at 20:51

## 2024-02-14 RX ADMIN — Medication 2 PUFF: at 08:29

## 2024-02-14 RX ADMIN — IPRATROPIUM BROMIDE AND ALBUTEROL SULFATE 1 DOSE: 2.5; .5 SOLUTION RESPIRATORY (INHALATION) at 08:29

## 2024-02-14 RX ADMIN — IPRATROPIUM BROMIDE AND ALBUTEROL SULFATE 1 DOSE: 2.5; .5 SOLUTION RESPIRATORY (INHALATION) at 20:05

## 2024-02-14 RX ADMIN — Medication 1 CAPSULE: at 08:56

## 2024-02-14 ASSESSMENT — PAIN DESCRIPTION - ORIENTATION
ORIENTATION: UPPER
ORIENTATION: LEFT;RIGHT
ORIENTATION: LOWER;UPPER
ORIENTATION: POSTERIOR

## 2024-02-14 ASSESSMENT — PAIN DESCRIPTION - DESCRIPTORS
DESCRIPTORS: ACHING
DESCRIPTORS: ACHING;CRAMPING;CRUSHING

## 2024-02-14 ASSESSMENT — PAIN DESCRIPTION - LOCATION
LOCATION: GENERALIZED;BACK
LOCATION: ABDOMEN
LOCATION: BACK
LOCATION: GENERALIZED

## 2024-02-14 ASSESSMENT — PAIN - FUNCTIONAL ASSESSMENT
PAIN_FUNCTIONAL_ASSESSMENT: ACTIVITIES ARE NOT PREVENTED

## 2024-02-14 ASSESSMENT — PAIN SCALES - GENERAL
PAINLEVEL_OUTOF10: 4
PAINLEVEL_OUTOF10: 7
PAINLEVEL_OUTOF10: 4
PAINLEVEL_OUTOF10: 2
PAINLEVEL_OUTOF10: 3
PAINLEVEL_OUTOF10: 8

## 2024-02-14 ASSESSMENT — PAIN SCALES - WONG BAKER
WONGBAKER_NUMERICALRESPONSE: 2
WONGBAKER_NUMERICALRESPONSE: 2

## 2024-02-14 NOTE — WOUND CARE
Received order to apply 2-layer compression wrap to LLE from Dr. Oviedo.  Dressing applied.  See dressing order.  RN requested evaluation of skin folds to chest and abdomen for possible yeast.  Patient complains of itching and burning to left chest skin fold and left abdominal fold.  Yeast noted to each area.  Apply Micotin 2% topical powder to each area BID.  Patient reported that he is still having diarrhea.  Will apply a air pump to gel surface for increased pressure reduction and microclimate control.  Re-consult WCN for wound / and or skin care concerns.     
Maintain HOB at 30 degrees or less, if not contraindicated, to reduce pressure to buttocks and sacrum.  Raise foot of bed to help prevent friction and shear injury from sliding down in the bed.  Re-consult WCN for other skin / wound care concerns.       Discharge Wound Care Needs: TBD by Dr. Oviedo.     Teaching completed with:   [] Patient           [] Family member       [] Caregiver          [] Nursing  [] Other    Patient/Caregiver Teaching:  Level of patient/caregiver understanding able to:   [] Indicates understanding       [] Needs reinforcement  [] Unsuccessful      [] Verbal Understanding  [] Demonstrated understanding       [] No evidence of learning  [] Refused teaching         [] N/A       Electronically signed by Viki Lara RN on 2/13/2024 at 3:40 PM

## 2024-02-14 NOTE — CARE COORDINATION
02/14/24 1039   Service Assessment   Patient Orientation Alert and Oriented   Cognition Alert   History Provided By Patient   Primary Caregiver Self   Accompanied By/Relationship Patient alone in room.   Support Systems Spouse/Significant Other   Patient's Healthcare Decision Maker is: Patient Declined (Legal Next of Kin Remains as Decision Maker)   PCP Verified by CM Yes   Last Visit to PCP Within last 3 months   Prior Functional Level Assistance with the following:;Bathing   Current Functional Level Assistance with the following:;Bathing   Can patient return to prior living arrangement Yes   Ability to make needs known: Good   Family able to assist with home care needs: Yes   Would you like for me to discuss the discharge plan with any other family members/significant others, and if so, who? No   Financial Resources Medicare   Community Resources None   Social/Functional History   Lives With Spouse   Type of Home House   Home Layout One level   Home Access Ramped entrance   Discharge Planning   Type of Residence House   Living Arrangements Spouse/Significant Other   Patient expects to be discharged to: House   Services At/After Discharge   Mode of Transport at Discharge Other (see comment)  (wife)   Confirm Follow Up Transport Family       Patient confirmed demographics on chart. Patient lives with his wife in a single story house with a ramped entrance. He completes most ADLs independently, and his wife transports him everywhere. He is currently receiving [facility] health services from [facility]. He has been to [facility] in the past. Patient is uncertain of his discharge plans at this time. CM will continue to follow for any needs.    Advance Care Planning     General Advance Care Planning (ACP) Conversation    Date of Conversation: 2/14/2024  Conducted with: Patient with Decision Making Capacity    Healthcare Decision Maker:    Primary Decision Maker: Migdalia Casillas - Spouse -

## 2024-02-15 ENCOUNTER — HOSPITAL ENCOUNTER (INPATIENT)
Facility: HOSPITAL | Age: 69
Discharge: HOME | DRG: 193 | End: 2024-02-17
Attending: FAMILY MEDICINE
Payer: MEDICARE

## 2024-02-15 LAB
ALBUMIN SERPL-MCNC: ABNORMAL G/DL (ref 3.5–5)
ALBUMIN/GLOB SERPL: ABNORMAL (ref 1.1–2.2)
ALP SERPL-CCNC: 89 U/L (ref 45–117)
ALT SERPL-CCNC: 32 U/L (ref 12–78)
ANION GAP SERPL CALC-SCNC: 7 MMOL/L (ref 5–15)
AST SERPL W P-5'-P-CCNC: 45 U/L (ref 15–37)
BILIRUB SERPL-MCNC: 0.9 MG/DL (ref 0.2–1)
BUN SERPL-MCNC: 24 MG/DL (ref 6–20)
BUN/CREAT SERPL: 18 (ref 12–20)
CA-I BLD-MCNC: 8.8 MG/DL (ref 8.5–10.1)
CHLORIDE SERPL-SCNC: 104 MMOL/L (ref 97–108)
CO2 SERPL-SCNC: 25 MMOL/L (ref 21–32)
CREAT SERPL-MCNC: 1.35 MG/DL (ref 0.7–1.3)
ECHO BSA: 2.88 M2
ERYTHROCYTE [DISTWIDTH] IN BLOOD BY AUTOMATED COUNT: 14.6 % (ref 11.5–14.5)
GLOBULIN SER CALC-MCNC: ABNORMAL G/DL (ref 2–4)
GLUCOSE BLD STRIP.AUTO-MCNC: 120 MG/DL (ref 65–100)
GLUCOSE BLD STRIP.AUTO-MCNC: 160 MG/DL (ref 65–100)
GLUCOSE BLD STRIP.AUTO-MCNC: 167 MG/DL (ref 65–100)
GLUCOSE BLD STRIP.AUTO-MCNC: 264 MG/DL (ref 65–100)
GLUCOSE SERPL-MCNC: 97 MG/DL (ref 65–100)
HCT VFR BLD AUTO: 32.2 % (ref 36.6–50.3)
HGB BLD-MCNC: 10.6 G/DL (ref 12.1–17)
MCH RBC QN AUTO: 30.9 PG (ref 26–34)
MCHC RBC AUTO-ENTMCNC: 32.9 G/DL (ref 30–36.5)
MCV RBC AUTO: 93.9 FL (ref 80–99)
NRBC # BLD: 0 K/UL (ref 0–0.01)
NRBC BLD-RTO: 0 PER 100 WBC
PERFORMED BY:: ABNORMAL
PLATELET # BLD AUTO: 175 K/UL (ref 150–400)
PMV BLD AUTO: 12.4 FL (ref 8.9–12.9)
POTASSIUM SERPL-SCNC: 3.8 MMOL/L (ref 3.5–5.1)
PROT SERPL-MCNC: 7 G/DL (ref 6.4–8.2)
RBC # BLD AUTO: 3.43 M/UL (ref 4.1–5.7)
SODIUM SERPL-SCNC: 136 MMOL/L (ref 136–145)
WBC # BLD AUTO: 10.7 K/UL (ref 4.1–11.1)

## 2024-02-15 PROCEDURE — 94640 AIRWAY INHALATION TREATMENT: CPT

## 2024-02-15 PROCEDURE — 94761 N-INVAS EAR/PLS OXIMETRY MLT: CPT

## 2024-02-15 PROCEDURE — 80053 COMPREHEN METABOLIC PANEL: CPT

## 2024-02-15 PROCEDURE — 99222 1ST HOSP IP/OBS MODERATE 55: CPT | Performed by: PODIATRIST

## 2024-02-15 PROCEDURE — 6370000000 HC RX 637 (ALT 250 FOR IP): Performed by: FAMILY MEDICINE

## 2024-02-15 PROCEDURE — 1100000000 HC RM PRIVATE

## 2024-02-15 PROCEDURE — 2580000003 HC RX 258: Performed by: FAMILY MEDICINE

## 2024-02-15 PROCEDURE — 82962 GLUCOSE BLOOD TEST: CPT

## 2024-02-15 PROCEDURE — 85027 COMPLETE CBC AUTOMATED: CPT

## 2024-02-15 PROCEDURE — 6360000002 HC RX W HCPCS: Performed by: FAMILY MEDICINE

## 2024-02-15 PROCEDURE — 93970 EXTREMITY STUDY: CPT

## 2024-02-15 PROCEDURE — 2700000000 HC OXYGEN THERAPY PER DAY

## 2024-02-15 PROCEDURE — 36415 COLL VENOUS BLD VENIPUNCTURE: CPT

## 2024-02-15 RX ADMIN — IPRATROPIUM BROMIDE AND ALBUTEROL SULFATE 1 DOSE: 2.5; .5 SOLUTION RESPIRATORY (INHALATION) at 09:57

## 2024-02-15 RX ADMIN — MICONAZOLE NITRATE: 20 POWDER TOPICAL at 08:59

## 2024-02-15 RX ADMIN — Medication 1 CAPSULE: at 08:57

## 2024-02-15 RX ADMIN — AZITHROMYCIN MONOHYDRATE 500 MG: 500 INJECTION, POWDER, LYOPHILIZED, FOR SOLUTION INTRAVENOUS at 22:54

## 2024-02-15 RX ADMIN — GABAPENTIN 300 MG: 300 CAPSULE ORAL at 08:56

## 2024-02-15 RX ADMIN — POTASSIUM CHLORIDE 10 MEQ: 750 TABLET, EXTENDED RELEASE ORAL at 12:51

## 2024-02-15 RX ADMIN — SODIUM CHLORIDE, PRESERVATIVE FREE 10 ML: 5 INJECTION INTRAVENOUS at 20:50

## 2024-02-15 RX ADMIN — GABAPENTIN 300 MG: 300 CAPSULE ORAL at 12:51

## 2024-02-15 RX ADMIN — Medication 800 UNITS: at 20:46

## 2024-02-15 RX ADMIN — INSULIN GLARGINE 34 UNITS: 100 INJECTION, SOLUTION SUBCUTANEOUS at 21:16

## 2024-02-15 RX ADMIN — INSULIN LISPRO 8 UNITS: 100 INJECTION, SOLUTION INTRAVENOUS; SUBCUTANEOUS at 12:51

## 2024-02-15 RX ADMIN — OXYCODONE 5 MG: 5 TABLET ORAL at 08:57

## 2024-02-15 RX ADMIN — IPRATROPIUM BROMIDE AND ALBUTEROL SULFATE 1 DOSE: 2.5; .5 SOLUTION RESPIRATORY (INHALATION) at 02:25

## 2024-02-15 RX ADMIN — OXYCODONE HYDROCHLORIDE AND ACETAMINOPHEN 1000 MG: 500 TABLET ORAL at 20:47

## 2024-02-15 RX ADMIN — POTASSIUM CHLORIDE 10 MEQ: 750 TABLET, EXTENDED RELEASE ORAL at 20:46

## 2024-02-15 RX ADMIN — GABAPENTIN 300 MG: 300 CAPSULE ORAL at 17:36

## 2024-02-15 RX ADMIN — OXYCODONE 5 MG: 5 TABLET ORAL at 20:47

## 2024-02-15 RX ADMIN — Medication 2 PUFF: at 09:56

## 2024-02-15 RX ADMIN — TRAMADOL HYDROCHLORIDE 50 MG: 50 TABLET, COATED ORAL at 17:36

## 2024-02-15 RX ADMIN — IPRATROPIUM BROMIDE AND ALBUTEROL SULFATE 1 DOSE: 2.5; .5 SOLUTION RESPIRATORY (INHALATION) at 14:04

## 2024-02-15 RX ADMIN — COLCHICINE 0.6 MG: 0.6 TABLET, FILM COATED ORAL at 08:57

## 2024-02-15 RX ADMIN — ENOXAPARIN SODIUM 40 MG: 100 INJECTION SUBCUTANEOUS at 20:46

## 2024-02-15 RX ADMIN — ENOXAPARIN SODIUM 40 MG: 100 INJECTION SUBCUTANEOUS at 08:58

## 2024-02-15 RX ADMIN — ALLOPURINOL 200 MG: 100 TABLET ORAL at 08:56

## 2024-02-15 RX ADMIN — Medication 2 PUFF: at 21:23

## 2024-02-15 RX ADMIN — ATORVASTATIN CALCIUM 40 MG: 40 TABLET, FILM COATED ORAL at 20:47

## 2024-02-15 RX ADMIN — POTASSIUM CHLORIDE 10 MEQ: 750 TABLET, EXTENDED RELEASE ORAL at 08:56

## 2024-02-15 RX ADMIN — MICONAZOLE NITRATE: 20 POWDER TOPICAL at 20:49

## 2024-02-15 RX ADMIN — Medication 800 UNITS: at 08:56

## 2024-02-15 RX ADMIN — POTASSIUM CHLORIDE 10 MEQ: 750 TABLET, EXTENDED RELEASE ORAL at 17:52

## 2024-02-15 RX ADMIN — SODIUM CHLORIDE, PRESERVATIVE FREE 10 ML: 5 INJECTION INTRAVENOUS at 08:58

## 2024-02-15 RX ADMIN — ACETAMINOPHEN 650 MG: 325 TABLET ORAL at 14:35

## 2024-02-15 RX ADMIN — CEFTRIAXONE SODIUM 1000 MG: 1 INJECTION, POWDER, FOR SOLUTION INTRAMUSCULAR; INTRAVENOUS at 23:00

## 2024-02-15 RX ADMIN — ASPIRIN 81 MG 81 MG: 81 TABLET ORAL at 08:56

## 2024-02-15 RX ADMIN — GABAPENTIN 300 MG: 300 CAPSULE ORAL at 20:46

## 2024-02-15 RX ADMIN — FLUTICASONE PROPIONATE 2 SPRAY: 50 SPRAY, METERED NASAL at 08:57

## 2024-02-15 RX ADMIN — IPRATROPIUM BROMIDE AND ALBUTEROL SULFATE 1 DOSE: 2.5; .5 SOLUTION RESPIRATORY (INHALATION) at 21:16

## 2024-02-15 RX ADMIN — OXYCODONE HYDROCHLORIDE AND ACETAMINOPHEN 1000 MG: 500 TABLET ORAL at 08:56

## 2024-02-15 ASSESSMENT — PAIN DESCRIPTION - DESCRIPTORS
DESCRIPTORS: ACHING;STABBING
DESCRIPTORS: ACHING

## 2024-02-15 ASSESSMENT — PAIN DESCRIPTION - LOCATION
LOCATION: HEAD
LOCATION: GENERALIZED
LOCATION: TOE (COMMENT WHICH ONE)
LOCATION: GENERALIZED

## 2024-02-15 ASSESSMENT — PAIN DESCRIPTION - ORIENTATION
ORIENTATION: LEFT
ORIENTATION: LEFT
ORIENTATION: RIGHT;LEFT

## 2024-02-15 ASSESSMENT — PAIN SCALES - GENERAL
PAINLEVEL_OUTOF10: 8
PAINLEVEL_OUTOF10: 4
PAINLEVEL_OUTOF10: 10
PAINLEVEL_OUTOF10: 7
PAINLEVEL_OUTOF10: 6
PAINLEVEL_OUTOF10: 3

## 2024-02-15 ASSESSMENT — PAIN SCALES - WONG BAKER: WONGBAKER_NUMERICALRESPONSE: 0

## 2024-02-15 NOTE — CONSULTS
NAME:  Shantanu Casillas   :   1955   MRN:   [id number]     ATTENDING: Ed Pino MD  PCP:  Ed Pino MD    Date/Time:  2024       Subjective:   REQUESTING PHYSICIAN:  Dr. Pino  REASON FOR CONSULT:    DEMARCO    History of presenting illness:    Patient is a 68-year-old male with a past medical history as listed below as well as morbid obesity who presented to the emergency room with complaint of chest and back pain, poor appetite, loose stools, and fever, onset 3 days ago.  Chest x-ray revealed left basilar airspace disease that may represent atelectasis or pneumonia.  Patient admitted with pneumonia.    Initial labs show creatinine of 1.91, with no history of underlying CKD.  Patient does have a history of frequent DEMARCO's.  Patient also noted to be with leukocytosis.  Otherwise, electrolytes are stable, and patient is hemodynamically stable.    Patient seen and examined in the emergency department.  He is awake, alert, and oriented x 3, no acute distress.  Patient reports onset of symptoms starting 3 days ago.  He reports decreased appetite as well as increased episodes of loose stools beginning then. He currently reports shortness of breath and chest and back discomfort.  Denies headache, N/V, and chills.  He is wearing oxygen at 2 L NC, no e/o dyspnea.  He does have chronic wounds on BLE, non-pitting edema noted as well.  Review of home medications reveal nephrotoxic drugs including metolazone, bumetanide, and Entresto.  No exposure to IV contrast or hypotensive events noticed.    Past Medical History:   Diagnosis Date    Arthritis     CAD (coronary artery disease)     Chronic obstructive pulmonary disease (HCC)     Diabetes (HCC)     Gout     Hypertension     Ill-defined condition     Sleep apnea     cpap      Past Surgical History:   Procedure Laterality Date    FOOT DEBRIDEMENT Right 12/15/2023    INCISION AND DRAINAGE RIGHT FIRST METATARSAL PHALANGEAL JOINT performed by Preet 
PULMONARY CONSULT  [facility]    Name: Shantanu Casillas MRN: [id number]   : 1955 Hospital: [facility]   Date: 2024  Admission date: 2024 Hospital Day: 2       HPI:     Patient Active Problem List   Diagnosis    Onychomycosis    Non-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 (HCC)    Idiopathic chronic venous hypertension of left lower extremity with ulcer (HCC)    Ulcer of left foot, with fat layer exposed (HCC)    Cellulitis    COVID-19    Venous ulcer (HCC)    Hyperkeratosis    Localized edema    Type 2 diabetes mellitus with diabetic polyneuropathy, with long-term current use of insulin (HCC)    Cellulitis and abscess of right leg    Ulcer of right heel, with fat layer exposed (HCC)    Calf ulcer, left, with fat layer exposed (HCC)    Ankle ulcer, right, with fat layer exposed (HCC)    Non-pressure chronic ulcer of other part of right lower leg with fat layer exposed (HCC)    DEMARCO (acute kidney injury) (HCC)    Back pain    Acute on chronic systolic CHF (congestive heart failure), NYHA class 2 (HCC)    Shortness of breath    Acute on chronic congestive heart failure (HCC)    Wound infection    Venous stasis ulcer (HCC)    Open wound of right foot    Atherosclerotic heart disease of native coronary artery without angina pectoris    Chronic obstructive pulmonary disease, unspecified (HCC)    Chronic pain disorder    Dependence on supplemental oxygen    Difficulty in walking, not elsewhere classified    Gout, unspecified    Hypertensive heart disease with heart failure (HCC)    Morbid (severe) obesity due to excess calories (HCC)    Muscle weakness (generalized)    Obstructive sleep apnea (adult) (pediatric)    Other lack of coordination    Other symptoms and signs involving the musculoskeletal system    Personal history of COVID-19    Presence of cardiac pacemaker    Type 2 diabetes mellitus with diabetic peripheral angiopathy without 
              Assessment:   Diabetic foot ulcer right  Venous ulcer left leg  Venous insufficiency  Edema bilateral lower extremity  Gout  Plan:     Patient seen and evaluated at bedside  - Current labs personally reviewed and findings dicussed with patient  -Recommend continuing local wound care at this time.  Wound care orders placed in chart.  -Discussed with patient to elevate bilateral lower extremity to reduce swelling.  Patient has compressive dressings bilateral feet.  -Continue gout management with allopurinol  -I will continue to follow patient while in-house.      Polo Oviedo DPM, CWSP, AACFAS  [facility]  [address] [place] [id number]  O: [phone]  F: [phone]

## 2024-02-15 NOTE — CARE COORDINATION
CM reviewed medical record. Patient's discharge plan is pending PT/OT evaluations and their recommendations. CM will continue to follow.

## 2024-02-16 LAB
ALBUMIN SERPL-MCNC: 2.3 G/DL (ref 3.5–5)
ALBUMIN/GLOB SERPL: 0.5 (ref 1.1–2.2)
ALP SERPL-CCNC: 96 U/L (ref 45–117)
ALT SERPL-CCNC: 43 U/L (ref 12–78)
ANION GAP SERPL CALC-SCNC: 6 MMOL/L (ref 5–15)
AST SERPL W P-5'-P-CCNC: 46 U/L (ref 15–37)
BASOPHILS # BLD: 0 K/UL (ref 0–0.1)
BASOPHILS NFR BLD: 0 % (ref 0–1)
BILIRUB SERPL-MCNC: 0.5 MG/DL (ref 0.2–1)
BNP SERPL-MCNC: 674 PG/ML
BUN SERPL-MCNC: 24 MG/DL (ref 6–20)
BUN/CREAT SERPL: 18 (ref 12–20)
CA-I BLD-MCNC: 9.1 MG/DL (ref 8.5–10.1)
CHLORIDE SERPL-SCNC: 101 MMOL/L (ref 97–108)
CO2 SERPL-SCNC: 29 MMOL/L (ref 21–32)
CREAT SERPL-MCNC: 1.32 MG/DL (ref 0.7–1.3)
DIFFERENTIAL METHOD BLD: ABNORMAL
EOSINOPHIL # BLD: 0.4 K/UL (ref 0–0.4)
EOSINOPHIL NFR BLD: 4 % (ref 0–7)
ERYTHROCYTE [DISTWIDTH] IN BLOOD BY AUTOMATED COUNT: 14.2 % (ref 11.5–14.5)
GLOBULIN SER CALC-MCNC: 5.1 G/DL (ref 2–4)
GLUCOSE BLD STRIP.AUTO-MCNC: 154 MG/DL (ref 65–100)
GLUCOSE BLD STRIP.AUTO-MCNC: 167 MG/DL (ref 65–100)
GLUCOSE BLD STRIP.AUTO-MCNC: 176 MG/DL (ref 65–100)
GLUCOSE BLD STRIP.AUTO-MCNC: 220 MG/DL (ref 65–100)
GLUCOSE SERPL-MCNC: 129 MG/DL (ref 65–100)
HCT VFR BLD AUTO: 30.9 % (ref 36.6–50.3)
HGB BLD-MCNC: 10.2 G/DL (ref 12.1–17)
IMM GRANULOCYTES # BLD AUTO: 0.2 K/UL (ref 0–0.04)
IMM GRANULOCYTES NFR BLD AUTO: 2 % (ref 0–0.5)
LYMPHOCYTES # BLD: 1.5 K/UL (ref 0.8–3.5)
LYMPHOCYTES NFR BLD: 16 % (ref 12–49)
MCH RBC QN AUTO: 30.8 PG (ref 26–34)
MCHC RBC AUTO-ENTMCNC: 33 G/DL (ref 30–36.5)
MCV RBC AUTO: 93.4 FL (ref 80–99)
MONOCYTES # BLD: 0.8 K/UL (ref 0–1)
MONOCYTES NFR BLD: 8 % (ref 5–13)
NEUTS SEG # BLD: 6.7 K/UL (ref 1.8–8)
NEUTS SEG NFR BLD: 70 % (ref 32–75)
NRBC # BLD: 0 K/UL (ref 0–0.01)
NRBC BLD-RTO: 0 PER 100 WBC
PERFORMED BY:: ABNORMAL
PLATELET # BLD AUTO: 278 K/UL (ref 150–400)
PMV BLD AUTO: 11.5 FL (ref 8.9–12.9)
POTASSIUM SERPL-SCNC: 3.7 MMOL/L (ref 3.5–5.1)
PROT SERPL-MCNC: 7.4 G/DL (ref 6.4–8.2)
RBC # BLD AUTO: 3.31 M/UL (ref 4.1–5.7)
SODIUM SERPL-SCNC: 136 MMOL/L (ref 136–145)
WBC # BLD AUTO: 9.5 K/UL (ref 4.1–11.1)

## 2024-02-16 PROCEDURE — 2700000000 HC OXYGEN THERAPY PER DAY

## 2024-02-16 PROCEDURE — 80053 COMPREHEN METABOLIC PANEL: CPT

## 2024-02-16 PROCEDURE — 97165 OT EVAL LOW COMPLEX 30 MIN: CPT

## 2024-02-16 PROCEDURE — 94761 N-INVAS EAR/PLS OXIMETRY MLT: CPT

## 2024-02-16 PROCEDURE — 94640 AIRWAY INHALATION TREATMENT: CPT

## 2024-02-16 PROCEDURE — 36415 COLL VENOUS BLD VENIPUNCTURE: CPT

## 2024-02-16 PROCEDURE — 97530 THERAPEUTIC ACTIVITIES: CPT

## 2024-02-16 PROCEDURE — 6370000000 HC RX 637 (ALT 250 FOR IP): Performed by: FAMILY MEDICINE

## 2024-02-16 PROCEDURE — 6360000002 HC RX W HCPCS: Performed by: FAMILY MEDICINE

## 2024-02-16 PROCEDURE — 97161 PT EVAL LOW COMPLEX 20 MIN: CPT

## 2024-02-16 PROCEDURE — 83880 ASSAY OF NATRIURETIC PEPTIDE: CPT

## 2024-02-16 PROCEDURE — 2580000003 HC RX 258: Performed by: FAMILY MEDICINE

## 2024-02-16 PROCEDURE — 6370000000 HC RX 637 (ALT 250 FOR IP): Performed by: INTERNAL MEDICINE

## 2024-02-16 PROCEDURE — 85025 COMPLETE CBC W/AUTO DIFF WBC: CPT

## 2024-02-16 PROCEDURE — 1100000000 HC RM PRIVATE

## 2024-02-16 PROCEDURE — 82962 GLUCOSE BLOOD TEST: CPT

## 2024-02-16 RX ORDER — IPRATROPIUM BROMIDE AND ALBUTEROL SULFATE 2.5; .5 MG/3ML; MG/3ML
1 SOLUTION RESPIRATORY (INHALATION)
Status: DISCONTINUED | OUTPATIENT
Start: 2024-02-16 | End: 2024-02-19 | Stop reason: HOSPADM

## 2024-02-16 RX ORDER — OXYCODONE HYDROCHLORIDE 10 MG/1
10 TABLET ORAL 2 TIMES DAILY
Status: DISCONTINUED | OUTPATIENT
Start: 2024-02-16 | End: 2024-02-19 | Stop reason: HOSPADM

## 2024-02-16 RX ADMIN — AZITHROMYCIN MONOHYDRATE 500 MG: 500 INJECTION, POWDER, LYOPHILIZED, FOR SOLUTION INTRAVENOUS at 23:09

## 2024-02-16 RX ADMIN — ALLOPURINOL 200 MG: 100 TABLET ORAL at 08:39

## 2024-02-16 RX ADMIN — Medication 2 PUFF: at 19:49

## 2024-02-16 RX ADMIN — ENOXAPARIN SODIUM 40 MG: 100 INJECTION SUBCUTANEOUS at 21:53

## 2024-02-16 RX ADMIN — OXYCODONE HYDROCHLORIDE 10 MG: 10 TABLET ORAL at 08:39

## 2024-02-16 RX ADMIN — IPRATROPIUM BROMIDE AND ALBUTEROL SULFATE 1 DOSE: 2.5; .5 SOLUTION RESPIRATORY (INHALATION) at 19:48

## 2024-02-16 RX ADMIN — SODIUM CHLORIDE, PRESERVATIVE FREE 10 ML: 5 INJECTION INTRAVENOUS at 21:54

## 2024-02-16 RX ADMIN — FLUTICASONE PROPIONATE 2 SPRAY: 50 SPRAY, METERED NASAL at 08:41

## 2024-02-16 RX ADMIN — GABAPENTIN 300 MG: 300 CAPSULE ORAL at 16:14

## 2024-02-16 RX ADMIN — POTASSIUM CHLORIDE 10 MEQ: 750 TABLET, EXTENDED RELEASE ORAL at 08:39

## 2024-02-16 RX ADMIN — Medication 1 CAPSULE: at 08:40

## 2024-02-16 RX ADMIN — SODIUM CHLORIDE, PRESERVATIVE FREE 10 ML: 5 INJECTION INTRAVENOUS at 08:48

## 2024-02-16 RX ADMIN — MICONAZOLE NITRATE: 20 POWDER TOPICAL at 21:55

## 2024-02-16 RX ADMIN — OXYCODONE HYDROCHLORIDE AND ACETAMINOPHEN 1000 MG: 500 TABLET ORAL at 21:52

## 2024-02-16 RX ADMIN — ATORVASTATIN CALCIUM 40 MG: 40 TABLET, FILM COATED ORAL at 21:52

## 2024-02-16 RX ADMIN — IPRATROPIUM BROMIDE AND ALBUTEROL SULFATE 1 DOSE: 2.5; .5 SOLUTION RESPIRATORY (INHALATION) at 08:38

## 2024-02-16 RX ADMIN — GABAPENTIN 300 MG: 300 CAPSULE ORAL at 21:52

## 2024-02-16 RX ADMIN — OXYCODONE HYDROCHLORIDE 10 MG: 10 TABLET ORAL at 21:52

## 2024-02-16 RX ADMIN — MICONAZOLE NITRATE: 20 POWDER TOPICAL at 08:41

## 2024-02-16 RX ADMIN — ENOXAPARIN SODIUM 40 MG: 100 INJECTION SUBCUTANEOUS at 08:42

## 2024-02-16 RX ADMIN — ASPIRIN 81 MG 81 MG: 81 TABLET ORAL at 08:39

## 2024-02-16 RX ADMIN — GABAPENTIN 300 MG: 300 CAPSULE ORAL at 08:39

## 2024-02-16 RX ADMIN — OXYCODONE HYDROCHLORIDE AND ACETAMINOPHEN 1000 MG: 500 TABLET ORAL at 08:40

## 2024-02-16 RX ADMIN — GABAPENTIN 300 MG: 300 CAPSULE ORAL at 13:09

## 2024-02-16 RX ADMIN — COLCHICINE 0.6 MG: 0.6 TABLET, FILM COATED ORAL at 08:39

## 2024-02-16 RX ADMIN — ACETAMINOPHEN 650 MG: 325 TABLET ORAL at 04:07

## 2024-02-16 RX ADMIN — Medication 800 UNITS: at 21:52

## 2024-02-16 RX ADMIN — Medication 800 UNITS: at 08:39

## 2024-02-16 RX ADMIN — IPRATROPIUM BROMIDE AND ALBUTEROL SULFATE 1 DOSE: 2.5; .5 SOLUTION RESPIRATORY (INHALATION) at 03:52

## 2024-02-16 RX ADMIN — CEFTRIAXONE SODIUM 1000 MG: 1 INJECTION, POWDER, FOR SOLUTION INTRAMUSCULAR; INTRAVENOUS at 23:09

## 2024-02-16 RX ADMIN — POTASSIUM CHLORIDE 10 MEQ: 750 TABLET, EXTENDED RELEASE ORAL at 21:52

## 2024-02-16 RX ADMIN — INSULIN GLARGINE 34 UNITS: 100 INJECTION, SOLUTION SUBCUTANEOUS at 21:53

## 2024-02-16 RX ADMIN — Medication 2 PUFF: at 08:40

## 2024-02-16 RX ADMIN — POTASSIUM CHLORIDE 10 MEQ: 750 TABLET, EXTENDED RELEASE ORAL at 13:09

## 2024-02-16 RX ADMIN — POTASSIUM CHLORIDE 10 MEQ: 750 TABLET, EXTENDED RELEASE ORAL at 16:14

## 2024-02-16 ASSESSMENT — PAIN SCALES - WONG BAKER
WONGBAKER_NUMERICALRESPONSE: 0
WONGBAKER_NUMERICALRESPONSE: 0

## 2024-02-16 ASSESSMENT — PAIN DESCRIPTION - LOCATION
LOCATION: GENERALIZED
LOCATION: LEG
LOCATION: BACK
LOCATION: GENERALIZED
LOCATION: GENERALIZED
LOCATION: LEG

## 2024-02-16 ASSESSMENT — PAIN DESCRIPTION - DESCRIPTORS
DESCRIPTORS: ACHING
DESCRIPTORS: ACHING;THROBBING
DESCRIPTORS: ACHING

## 2024-02-16 ASSESSMENT — PAIN DESCRIPTION - ORIENTATION
ORIENTATION: LEFT
ORIENTATION: LEFT

## 2024-02-16 ASSESSMENT — PAIN SCALES - GENERAL
PAINLEVEL_OUTOF10: 0
PAINLEVEL_OUTOF10: 5
PAINLEVEL_OUTOF10: 8
PAINLEVEL_OUTOF10: 10
PAINLEVEL_OUTOF10: 4
PAINLEVEL_OUTOF10: 5
PAINLEVEL_OUTOF10: 5
PAINLEVEL_OUTOF10: 0
PAINLEVEL_OUTOF10: 2

## 2024-02-16 NOTE — CARE COORDINATION
0815: Chart reviewed.    Per notes; patient on IV ABX followed via nephrology, podiatry, pulmonology and wound care nurse.    Per pulmonology note, patient is on home oxygen and has a trilogy.    Per SW note, patient is current with [facility] but no referral sent to resume care.    CM will follow-up with patient today.    PT/OT evals pending discharge recs.    Profile built in [facility].    CM will continue to follow patient and recs of medical team.    1110: CM met with patient at bedside to discuss DCP.    Patient would like to discharge to [facility] upon discharge.    AUTH not needed.    Patient also agreeable to [facility] referral being sent to follow.    Choice letter received, placed on chart and referrals sent.    1155: [facility] accepted patient.    Back-up plan: [facility] accepted patient with SOC 02/18/2024.    1550: Updates attached in [facility].

## 2024-02-17 LAB
ALBUMIN SERPL-MCNC: 2.3 G/DL (ref 3.5–5)
ALBUMIN/GLOB SERPL: 0.5 (ref 1.1–2.2)
ALP SERPL-CCNC: 96 U/L (ref 45–117)
ALT SERPL-CCNC: 51 U/L (ref 12–78)
ANION GAP SERPL CALC-SCNC: 6 MMOL/L (ref 5–15)
AST SERPL W P-5'-P-CCNC: 55 U/L (ref 15–37)
BILIRUB SERPL-MCNC: 0.4 MG/DL (ref 0.2–1)
BUN SERPL-MCNC: 21 MG/DL (ref 6–20)
BUN/CREAT SERPL: 17 (ref 12–20)
CA-I BLD-MCNC: 9.2 MG/DL (ref 8.5–10.1)
CHLORIDE SERPL-SCNC: 104 MMOL/L (ref 97–108)
CO2 SERPL-SCNC: 29 MMOL/L (ref 21–32)
CREAT SERPL-MCNC: 1.26 MG/DL (ref 0.7–1.3)
ERYTHROCYTE [DISTWIDTH] IN BLOOD BY AUTOMATED COUNT: 14 % (ref 11.5–14.5)
GLOBULIN SER CALC-MCNC: 5.1 G/DL (ref 2–4)
GLUCOSE BLD STRIP.AUTO-MCNC: 167 MG/DL (ref 65–100)
GLUCOSE BLD STRIP.AUTO-MCNC: 184 MG/DL (ref 65–100)
GLUCOSE BLD STRIP.AUTO-MCNC: 193 MG/DL (ref 65–100)
GLUCOSE BLD STRIP.AUTO-MCNC: 195 MG/DL (ref 65–100)
GLUCOSE SERPL-MCNC: 198 MG/DL (ref 65–100)
HCT VFR BLD AUTO: 32.9 % (ref 36.6–50.3)
HGB BLD-MCNC: 10.5 G/DL (ref 12.1–17)
MCH RBC QN AUTO: 30.3 PG (ref 26–34)
MCHC RBC AUTO-ENTMCNC: 31.9 G/DL (ref 30–36.5)
MCV RBC AUTO: 95.1 FL (ref 80–99)
NRBC # BLD: 0 K/UL (ref 0–0.01)
NRBC BLD-RTO: 0 PER 100 WBC
PERFORMED BY:: ABNORMAL
PLATELET # BLD AUTO: 284 K/UL (ref 150–400)
PMV BLD AUTO: 10.5 FL (ref 8.9–12.9)
POTASSIUM SERPL-SCNC: 3.7 MMOL/L (ref 3.5–5.1)
PROT SERPL-MCNC: 7.4 G/DL (ref 6.4–8.2)
RBC # BLD AUTO: 3.46 M/UL (ref 4.1–5.7)
SODIUM SERPL-SCNC: 139 MMOL/L (ref 136–145)
WBC # BLD AUTO: 6.1 K/UL (ref 4.1–11.1)

## 2024-02-17 PROCEDURE — 85027 COMPLETE CBC AUTOMATED: CPT

## 2024-02-17 PROCEDURE — 2700000000 HC OXYGEN THERAPY PER DAY

## 2024-02-17 PROCEDURE — 80053 COMPREHEN METABOLIC PANEL: CPT

## 2024-02-17 PROCEDURE — 6370000000 HC RX 637 (ALT 250 FOR IP): Performed by: FAMILY MEDICINE

## 2024-02-17 PROCEDURE — 1100000000 HC RM PRIVATE

## 2024-02-17 PROCEDURE — 6370000000 HC RX 637 (ALT 250 FOR IP): Performed by: INTERNAL MEDICINE

## 2024-02-17 PROCEDURE — 6360000002 HC RX W HCPCS: Performed by: FAMILY MEDICINE

## 2024-02-17 PROCEDURE — 94640 AIRWAY INHALATION TREATMENT: CPT

## 2024-02-17 PROCEDURE — 36415 COLL VENOUS BLD VENIPUNCTURE: CPT

## 2024-02-17 PROCEDURE — 94761 N-INVAS EAR/PLS OXIMETRY MLT: CPT

## 2024-02-17 PROCEDURE — 82962 GLUCOSE BLOOD TEST: CPT

## 2024-02-17 PROCEDURE — 2580000003 HC RX 258: Performed by: FAMILY MEDICINE

## 2024-02-17 RX ORDER — BUMETANIDE 1 MG/1
1 TABLET ORAL 2 TIMES DAILY
Status: DISCONTINUED | OUTPATIENT
Start: 2024-02-17 | End: 2024-02-19

## 2024-02-17 RX ADMIN — GABAPENTIN 300 MG: 300 CAPSULE ORAL at 08:29

## 2024-02-17 RX ADMIN — BUMETANIDE 1 MG: 1 TABLET ORAL at 21:40

## 2024-02-17 RX ADMIN — MICONAZOLE NITRATE: 20 POWDER TOPICAL at 21:41

## 2024-02-17 RX ADMIN — POTASSIUM CHLORIDE 10 MEQ: 750 TABLET, EXTENDED RELEASE ORAL at 21:41

## 2024-02-17 RX ADMIN — OXYCODONE HYDROCHLORIDE AND ACETAMINOPHEN 1000 MG: 500 TABLET ORAL at 21:40

## 2024-02-17 RX ADMIN — ALLOPURINOL 200 MG: 100 TABLET ORAL at 08:29

## 2024-02-17 RX ADMIN — AZITHROMYCIN MONOHYDRATE 500 MG: 500 INJECTION, POWDER, LYOPHILIZED, FOR SOLUTION INTRAVENOUS at 22:50

## 2024-02-17 RX ADMIN — GABAPENTIN 300 MG: 300 CAPSULE ORAL at 16:12

## 2024-02-17 RX ADMIN — Medication 2 PUFF: at 22:06

## 2024-02-17 RX ADMIN — IPRATROPIUM BROMIDE AND ALBUTEROL SULFATE 1 DOSE: 2.5; .5 SOLUTION RESPIRATORY (INHALATION) at 22:02

## 2024-02-17 RX ADMIN — Medication 2 PUFF: at 07:51

## 2024-02-17 RX ADMIN — POTASSIUM CHLORIDE 10 MEQ: 750 TABLET, EXTENDED RELEASE ORAL at 13:29

## 2024-02-17 RX ADMIN — OXYCODONE HYDROCHLORIDE 10 MG: 10 TABLET ORAL at 08:28

## 2024-02-17 RX ADMIN — OXYCODONE HYDROCHLORIDE 10 MG: 10 TABLET ORAL at 21:39

## 2024-02-17 RX ADMIN — COLCHICINE 0.6 MG: 0.6 TABLET, FILM COATED ORAL at 08:27

## 2024-02-17 RX ADMIN — Medication 1 CAPSULE: at 08:30

## 2024-02-17 RX ADMIN — GABAPENTIN 300 MG: 300 CAPSULE ORAL at 21:40

## 2024-02-17 RX ADMIN — FLUTICASONE PROPIONATE 2 SPRAY: 50 SPRAY, METERED NASAL at 08:31

## 2024-02-17 RX ADMIN — IPRATROPIUM BROMIDE AND ALBUTEROL SULFATE 1 DOSE: 2.5; .5 SOLUTION RESPIRATORY (INHALATION) at 07:52

## 2024-02-17 RX ADMIN — Medication 800 UNITS: at 09:39

## 2024-02-17 RX ADMIN — OXYCODONE HYDROCHLORIDE AND ACETAMINOPHEN 1000 MG: 500 TABLET ORAL at 08:29

## 2024-02-17 RX ADMIN — POTASSIUM CHLORIDE 10 MEQ: 750 TABLET, EXTENDED RELEASE ORAL at 08:29

## 2024-02-17 RX ADMIN — INSULIN GLARGINE 34 UNITS: 100 INJECTION, SOLUTION SUBCUTANEOUS at 21:39

## 2024-02-17 RX ADMIN — ASPIRIN 81 MG 81 MG: 81 TABLET ORAL at 08:29

## 2024-02-17 RX ADMIN — ATORVASTATIN CALCIUM 40 MG: 40 TABLET, FILM COATED ORAL at 21:39

## 2024-02-17 RX ADMIN — CEFTRIAXONE SODIUM 1000 MG: 1 INJECTION, POWDER, FOR SOLUTION INTRAMUSCULAR; INTRAVENOUS at 22:50

## 2024-02-17 RX ADMIN — ENOXAPARIN SODIUM 40 MG: 100 INJECTION SUBCUTANEOUS at 08:33

## 2024-02-17 RX ADMIN — Medication 800 UNITS: at 21:39

## 2024-02-17 RX ADMIN — SODIUM CHLORIDE, PRESERVATIVE FREE 10 ML: 5 INJECTION INTRAVENOUS at 08:30

## 2024-02-17 RX ADMIN — MICONAZOLE NITRATE: 20 POWDER TOPICAL at 08:32

## 2024-02-17 RX ADMIN — SACUBITRIL AND VALSARTAN 1 TABLET: 24; 26 TABLET, FILM COATED ORAL at 21:39

## 2024-02-17 RX ADMIN — GABAPENTIN 300 MG: 300 CAPSULE ORAL at 13:29

## 2024-02-17 RX ADMIN — POTASSIUM CHLORIDE 10 MEQ: 750 TABLET, EXTENDED RELEASE ORAL at 16:12

## 2024-02-17 RX ADMIN — TRAMADOL HYDROCHLORIDE 50 MG: 50 TABLET, COATED ORAL at 13:32

## 2024-02-17 RX ADMIN — SODIUM CHLORIDE, PRESERVATIVE FREE 10 ML: 5 INJECTION INTRAVENOUS at 21:42

## 2024-02-17 ASSESSMENT — PAIN DESCRIPTION - DESCRIPTORS
DESCRIPTORS: ACHING

## 2024-02-17 ASSESSMENT — PAIN SCALES - GENERAL
PAINLEVEL_OUTOF10: 8
PAINLEVEL_OUTOF10: 0
PAINLEVEL_OUTOF10: 8
PAINLEVEL_OUTOF10: 6
PAINLEVEL_OUTOF10: 8
PAINLEVEL_OUTOF10: 5

## 2024-02-17 ASSESSMENT — PAIN DESCRIPTION - ORIENTATION
ORIENTATION: RIGHT;LEFT
ORIENTATION: RIGHT
ORIENTATION: RIGHT
ORIENTATION: LEFT;RIGHT
ORIENTATION: RIGHT

## 2024-02-17 ASSESSMENT — PAIN DESCRIPTION - LOCATION
LOCATION: TOE (COMMENT WHICH ONE)
LOCATION: LEG
LOCATION: LEG
LOCATION: TOE (COMMENT WHICH ONE)
LOCATION: TOE (COMMENT WHICH ONE)

## 2024-02-17 NOTE — PLAN OF CARE
Problem: Discharge Planning  Goal: Discharge to home or other facility with appropriate resources  Outcome: Progressing     Problem: Pain  Goal: Verbalizes/displays adequate comfort level or baseline comfort level  Outcome: Progressing     Problem: Safety - Adult  Goal: Free from fall injury  Outcome: Progressing     Problem: Skin/Tissue Integrity  Goal: Absence of new skin breakdown  Description: 1.  Monitor for areas of redness and/or skin breakdown  2.  Assess vascular access sites hourly  3.  Every 4-6 hours minimum:  Change oxygen saturation probe site  4.  Every 4-6 hours:  If on nasal continuous positive airway pressure, respiratory therapy assess nares and determine need for appliance change or resting period.  Outcome: Progressing     Problem: Chronic Conditions and Co-morbidities  Goal: Patient's chronic conditions and co-morbidity symptoms are monitored and maintained or improved  Outcome: Progressing     
  Problem: Discharge Planning  Goal: Discharge to home or other facility with appropriate resources  Outcome: Progressing  Flowsheets (Taken 2/14/2024 0857)  Discharge to home or other facility with appropriate resources: Identify barriers to discharge with patient and caregiver     Problem: Pain  Goal: Verbalizes/displays adequate comfort level or baseline comfort level  Outcome: Progressing  Flowsheets (Taken 2/14/2024 0857)  Verbalizes/displays adequate comfort level or baseline comfort level: Encourage patient to monitor pain and request assistance     Problem: Safety - Adult  Goal: Free from fall injury  Outcome: Progressing     Problem: Skin/Tissue Integrity  Goal: Absence of new skin breakdown  Description: 1.  Monitor for areas of redness and/or skin breakdown  2.  Assess vascular access sites hourly  3.  Every 4-6 hours minimum:  Change oxygen saturation probe site  4.  Every 4-6 hours:  If on nasal continuous positive airway pressure, respiratory therapy assess nares and determine need for appliance change or resting period.  Outcome: Progressing     Problem: Chronic Conditions and Co-morbidities  Goal: Patient's chronic conditions and co-morbidity symptoms are monitored and maintained or improved  Outcome: Progressing  Flowsheets (Taken 2/14/2024 0857)  Care Plan - Patient's Chronic Conditions and Co-Morbidity Symptoms are Monitored and Maintained or Improved: Monitor and assess patient's chronic conditions and comorbid symptoms for stability, deterioration, or improvement     
  Problem: Discharge Planning  Goal: Discharge to home or other facility with appropriate resources  Outcome: Progressing  Flowsheets (Taken 2/15/2024 0000 by Lillian Andersen, RN)  Discharge to home or other facility with appropriate resources: Identify barriers to discharge with patient and caregiver     Problem: Pain  Goal: Verbalizes/displays adequate comfort level or baseline comfort level  Outcome: Progressing     Problem: Safety - Adult  Goal: Free from fall injury  Outcome: Progressing     Problem: Skin/Tissue Integrity  Goal: Absence of new skin breakdown  Description: 1.  Monitor for areas of redness and/or skin breakdown  2.  Assess vascular access sites hourly  3.  Every 4-6 hours minimum:  Change oxygen saturation probe site  4.  Every 4-6 hours:  If on nasal continuous positive airway pressure, respiratory therapy assess nares and determine need for appliance change or resting period.  Outcome: Progressing     Problem: Chronic Conditions and Co-morbidities  Goal: Patient's chronic conditions and co-morbidity symptoms are monitored and maintained or improved  Outcome: Progressing  Flowsheets (Taken 2/15/2024 0000 by Lillian Andersen, RN)  Care Plan - Patient's Chronic Conditions and Co-Morbidity Symptoms are Monitored and Maintained or Improved: Monitor and assess patient's chronic conditions and comorbid symptoms for stability, deterioration, or improvement     
  Problem: Discharge Planning  Goal: Discharge to home or other facility with appropriate resources  Outcome: Progressing  Flowsheets (Taken 2/16/2024 2145 by de Los Reyes, Beverly Jover, RN)  Discharge to home or other facility with appropriate resources: Identify barriers to discharge with patient and caregiver     Problem: Pain  Goal: Verbalizes/displays adequate comfort level or baseline comfort level  Outcome: Progressing     Problem: Safety - Adult  Goal: Free from fall injury  Outcome: Progressing  Flowsheets (Taken 2/16/2024 2145 by de Los Reyes, Beverly Jover, RN)  Free From Fall Injury: Instruct family/caregiver on patient safety     Problem: Skin/Tissue Integrity  Goal: Absence of new skin breakdown  Description: 1.  Monitor for areas of redness and/or skin breakdown  2.  Assess vascular access sites hourly  3.  Every 4-6 hours minimum:  Change oxygen saturation probe site  4.  Every 4-6 hours:  If on nasal continuous positive airway pressure, respiratory therapy assess nares and determine need for appliance change or resting period.  Outcome: Progressing     Problem: Chronic Conditions and Co-morbidities  Goal: Patient's chronic conditions and co-morbidity symptoms are monitored and maintained or improved  Outcome: Progressing  Flowsheets (Taken 2/16/2024 2145 by de Los Reyes, Beverly Jover, RN)  Care Plan - Patient's Chronic Conditions and Co-Morbidity Symptoms are Monitored and Maintained or Improved: Monitor and assess patient's chronic conditions and comorbid symptoms for stability, deterioration, or improvement     
- Dynamic: Constant support;Fair      ADL Assessment:                                                  LE Dressing: Maximum assistance                          Functional Measure:    [facility] AM-PACTM \"6 Clicks\"                                                       Daily Activity Inpatient Short Form  How much help from another person does the patient currently need... Total; A Lot A Little None   1.  Putting on and taking off regular lower body clothing? []  1 [x]  2 []  3 []  4   2.  Bathing (including washing, rinsing, drying)? []  1 [x]  2 []  3 []  4   3.  Toileting, which includes using toilet, bedpan or urinal? [] 1 []  2 [x]  3 []  4   4.  Putting on and taking off regular upper body clothing? []  1 []  2 []  3 [x]  4   5.  Taking care of personal grooming such as brushing teeth? []  1 []  2 []  3 [x]  4   6.  Eating meals? []  1 []  2 []  3 [x]  4   © , Trustees of [facility], under license to [employer]. All rights reserved     Score:      Interpretation of Tool:  Represents clinically-significant functional categories (i.e. Activities of daily living).  Percentage of Impairment CH    0%   CI    1-19% CJ    20-39% CK    40-59% CL    60-79% CM    80-99% CN     100%   [facility]  Score 6-24 24 23 20-22 15-19 10-14 7-9 6     Occupational Therapy Evaluation Charge Determination   History Examination Decision-Making   MEDIUM Complexity : Expanded review of history including physical, cognitive and psychocial history  LOW Complexity: 1-3 Performance deficits relating to physical, cognitive, or psychosocial skills that result in activity limitations and/or participation restrictions LOW Complexity: No comorbidities that affect functional and  no verbal  or physical assist needed to complete eval tasks      Based on the above components, the patient evaluation is determined to be of the following complexity level: Low    Pain Ratin/10   Pain Intervention(s):   nursing notified, 
to [employer]. All rights reserved     Score:  Initial: 17/24 Most Recent: X (Date: 2/16/2024 )   Interpretation of Tool:  Represents activities that are increasingly more difficult (i.e. Bed mobility, Transfers, Gait).  Score 24 23 22-20 19-15 14-10 9-7 6   Modifier CH CI CJ CK CL CM CN         Physical Therapy Evaluation Charge Determination   History Examination Presentation Decision-Making   MEDIUM  Complexity : 1-2 comorbidities / personal factors will impact the outcome/ POC  MEDIUM Complexity : 3 Standardized tests and measures addressing body structure, function, activity limitation and / or participation in recreation  LOW Complexity : Stable, uncomplicated  Other outcome measures [facility] 6  MEDIUM      Based on the above components, the patient evaluation is determined to be of the following complexity level: LOW    Pain Rating:  No c/o pain during session  Pain Intervention(s):       Activity Tolerance:   Fair     After treatment patient left in no apparent distress:   Bed locked and in lowest position Patient left in no apparent distress sitting up in chair, Call bell within reach, and Bed/ chair alarm activated and nsg updated.    COMMUNICATION/EDUCATION:   The patient’s plan of care was discussed with: Registered nurse and Case management    Patient Education  Education Given To: Patient  Education Provided: Role of Therapy;Plan of Care  Education Method: Verbal  Education Outcome: Verbalized understanding         Thank you for this referral.  Amanda Nolasco, PT  Minutes: 40

## 2024-02-18 LAB
ALBUMIN SERPL-MCNC: 2.4 G/DL (ref 3.5–5)
ALBUMIN/GLOB SERPL: 0.4 (ref 1.1–2.2)
ALP SERPL-CCNC: 92 U/L (ref 45–117)
ALT SERPL-CCNC: 55 U/L (ref 12–78)
ANION GAP SERPL CALC-SCNC: 5 MMOL/L (ref 5–15)
AST SERPL W P-5'-P-CCNC: 59 U/L (ref 15–37)
BILIRUB SERPL-MCNC: 0.4 MG/DL (ref 0.2–1)
BUN SERPL-MCNC: 24 MG/DL (ref 6–20)
BUN/CREAT SERPL: 20 (ref 12–20)
CA-I BLD-MCNC: 9.6 MG/DL (ref 8.5–10.1)
CHLORIDE SERPL-SCNC: 104 MMOL/L (ref 97–108)
CO2 SERPL-SCNC: 30 MMOL/L (ref 21–32)
CREAT SERPL-MCNC: 1.23 MG/DL (ref 0.7–1.3)
ERYTHROCYTE [DISTWIDTH] IN BLOOD BY AUTOMATED COUNT: 13.7 % (ref 11.5–14.5)
GLOBULIN SER CALC-MCNC: 5.6 G/DL (ref 2–4)
GLUCOSE BLD STRIP.AUTO-MCNC: 164 MG/DL (ref 65–100)
GLUCOSE BLD STRIP.AUTO-MCNC: 173 MG/DL (ref 65–100)
GLUCOSE BLD STRIP.AUTO-MCNC: 211 MG/DL (ref 65–100)
GLUCOSE BLD STRIP.AUTO-MCNC: 218 MG/DL (ref 65–100)
GLUCOSE SERPL-MCNC: 184 MG/DL (ref 65–100)
HCT VFR BLD AUTO: 34.7 % (ref 36.6–50.3)
HGB BLD-MCNC: 10.9 G/DL (ref 12.1–17)
MCH RBC QN AUTO: 30 PG (ref 26–34)
MCHC RBC AUTO-ENTMCNC: 31.4 G/DL (ref 30–36.5)
MCV RBC AUTO: 95.6 FL (ref 80–99)
NRBC # BLD: 0 K/UL (ref 0–0.01)
NRBC BLD-RTO: 0 PER 100 WBC
PERFORMED BY:: ABNORMAL
PLATELET # BLD AUTO: 309 K/UL (ref 150–400)
PMV BLD AUTO: 10.8 FL (ref 8.9–12.9)
POTASSIUM SERPL-SCNC: 4.1 MMOL/L (ref 3.5–5.1)
PROT SERPL-MCNC: 8 G/DL (ref 6.4–8.2)
RBC # BLD AUTO: 3.63 M/UL (ref 4.1–5.7)
SODIUM SERPL-SCNC: 139 MMOL/L (ref 136–145)
WBC # BLD AUTO: 6.1 K/UL (ref 4.1–11.1)

## 2024-02-18 PROCEDURE — 94640 AIRWAY INHALATION TREATMENT: CPT

## 2024-02-18 PROCEDURE — 6370000000 HC RX 637 (ALT 250 FOR IP): Performed by: INTERNAL MEDICINE

## 2024-02-18 PROCEDURE — 6370000000 HC RX 637 (ALT 250 FOR IP): Performed by: FAMILY MEDICINE

## 2024-02-18 PROCEDURE — 85027 COMPLETE CBC AUTOMATED: CPT

## 2024-02-18 PROCEDURE — 36415 COLL VENOUS BLD VENIPUNCTURE: CPT

## 2024-02-18 PROCEDURE — 2700000000 HC OXYGEN THERAPY PER DAY

## 2024-02-18 PROCEDURE — 2580000003 HC RX 258: Performed by: FAMILY MEDICINE

## 2024-02-18 PROCEDURE — 80053 COMPREHEN METABOLIC PANEL: CPT

## 2024-02-18 PROCEDURE — 1100000000 HC RM PRIVATE

## 2024-02-18 PROCEDURE — 82962 GLUCOSE BLOOD TEST: CPT

## 2024-02-18 PROCEDURE — 94761 N-INVAS EAR/PLS OXIMETRY MLT: CPT

## 2024-02-18 RX ADMIN — SACUBITRIL AND VALSARTAN 1 TABLET: 24; 26 TABLET, FILM COATED ORAL at 09:36

## 2024-02-18 RX ADMIN — Medication 2 PUFF: at 22:00

## 2024-02-18 RX ADMIN — OXYCODONE HYDROCHLORIDE 10 MG: 10 TABLET ORAL at 09:37

## 2024-02-18 RX ADMIN — Medication 2 PUFF: at 09:14

## 2024-02-18 RX ADMIN — GABAPENTIN 300 MG: 300 CAPSULE ORAL at 09:38

## 2024-02-18 RX ADMIN — BUMETANIDE 1 MG: 1 TABLET ORAL at 22:42

## 2024-02-18 RX ADMIN — POTASSIUM CHLORIDE 10 MEQ: 750 TABLET, EXTENDED RELEASE ORAL at 11:58

## 2024-02-18 RX ADMIN — FLUTICASONE PROPIONATE 2 SPRAY: 50 SPRAY, METERED NASAL at 09:39

## 2024-02-18 RX ADMIN — ALLOPURINOL 200 MG: 100 TABLET ORAL at 09:38

## 2024-02-18 RX ADMIN — INSULIN GLARGINE 34 UNITS: 100 INJECTION, SOLUTION SUBCUTANEOUS at 20:44

## 2024-02-18 RX ADMIN — IPRATROPIUM BROMIDE AND ALBUTEROL SULFATE 1 DOSE: 2.5; .5 SOLUTION RESPIRATORY (INHALATION) at 22:00

## 2024-02-18 RX ADMIN — GABAPENTIN 300 MG: 300 CAPSULE ORAL at 20:43

## 2024-02-18 RX ADMIN — POTASSIUM CHLORIDE 10 MEQ: 750 TABLET, EXTENDED RELEASE ORAL at 17:31

## 2024-02-18 RX ADMIN — OXYCODONE HYDROCHLORIDE AND ACETAMINOPHEN 1000 MG: 500 TABLET ORAL at 09:36

## 2024-02-18 RX ADMIN — SACUBITRIL AND VALSARTAN 1 TABLET: 24; 26 TABLET, FILM COATED ORAL at 20:44

## 2024-02-18 RX ADMIN — POTASSIUM CHLORIDE 10 MEQ: 750 TABLET, EXTENDED RELEASE ORAL at 20:44

## 2024-02-18 RX ADMIN — MICONAZOLE NITRATE: 20 POWDER TOPICAL at 09:39

## 2024-02-18 RX ADMIN — BUMETANIDE 1 MG: 1 TABLET ORAL at 09:36

## 2024-02-18 RX ADMIN — ATORVASTATIN CALCIUM 40 MG: 40 TABLET, FILM COATED ORAL at 20:43

## 2024-02-18 RX ADMIN — GABAPENTIN 300 MG: 300 CAPSULE ORAL at 17:31

## 2024-02-18 RX ADMIN — Medication 800 UNITS: at 09:37

## 2024-02-18 RX ADMIN — Medication 1 CAPSULE: at 09:37

## 2024-02-18 RX ADMIN — INSULIN LISPRO 4 UNITS: 100 INJECTION, SOLUTION INTRAVENOUS; SUBCUTANEOUS at 17:31

## 2024-02-18 RX ADMIN — OXYCODONE HYDROCHLORIDE AND ACETAMINOPHEN 1000 MG: 500 TABLET ORAL at 20:43

## 2024-02-18 RX ADMIN — IPRATROPIUM BROMIDE AND ALBUTEROL SULFATE 1 DOSE: 2.5; .5 SOLUTION RESPIRATORY (INHALATION) at 09:13

## 2024-02-18 RX ADMIN — MICONAZOLE NITRATE: 20 POWDER TOPICAL at 20:49

## 2024-02-18 RX ADMIN — SODIUM CHLORIDE, PRESERVATIVE FREE 10 ML: 5 INJECTION INTRAVENOUS at 09:38

## 2024-02-18 RX ADMIN — SODIUM CHLORIDE, PRESERVATIVE FREE 10 ML: 5 INJECTION INTRAVENOUS at 22:48

## 2024-02-18 RX ADMIN — OXYCODONE HYDROCHLORIDE 10 MG: 10 TABLET ORAL at 20:44

## 2024-02-18 RX ADMIN — POTASSIUM CHLORIDE 10 MEQ: 750 TABLET, EXTENDED RELEASE ORAL at 09:37

## 2024-02-18 RX ADMIN — ASPIRIN 81 MG 81 MG: 81 TABLET ORAL at 09:38

## 2024-02-18 RX ADMIN — INSULIN LISPRO 4 UNITS: 100 INJECTION, SOLUTION INTRAVENOUS; SUBCUTANEOUS at 11:58

## 2024-02-18 RX ADMIN — COLCHICINE 0.6 MG: 0.6 TABLET, FILM COATED ORAL at 09:38

## 2024-02-18 RX ADMIN — GABAPENTIN 300 MG: 300 CAPSULE ORAL at 12:02

## 2024-02-18 RX ADMIN — Medication 800 UNITS: at 20:44

## 2024-02-18 ASSESSMENT — PAIN SCALES - GENERAL
PAINLEVEL_OUTOF10: 10
PAINLEVEL_OUTOF10: 4
PAINLEVEL_OUTOF10: 0
PAINLEVEL_OUTOF10: 0

## 2024-02-19 VITALS
RESPIRATION RATE: 20 BRPM | HEART RATE: 80 BPM | WEIGHT: 315 LBS | BODY MASS INDEX: 44.1 KG/M2 | DIASTOLIC BLOOD PRESSURE: 78 MMHG | HEIGHT: 71 IN | TEMPERATURE: 98.6 F | OXYGEN SATURATION: 100 % | SYSTOLIC BLOOD PRESSURE: 136 MMHG

## 2024-02-19 PROBLEM — L97.411 CHRONIC ULCER OF RIGHT HEEL LIMITED TO BREAKDOWN OF SKIN (HCC): Status: ACTIVE | Noted: 2022-03-23

## 2024-02-19 PROBLEM — R60.1 GENERALIZED EDEMA: Status: ACTIVE | Noted: 2024-02-19

## 2024-02-19 LAB
ALBUMIN SERPL-MCNC: 2.5 G/DL (ref 3.5–5)
ALBUMIN/GLOB SERPL: 0.4 (ref 1.1–2.2)
ALP SERPL-CCNC: 95 U/L (ref 45–117)
ALT SERPL-CCNC: 59 U/L (ref 12–78)
ANION GAP SERPL CALC-SCNC: 3 MMOL/L (ref 5–15)
AST SERPL W P-5'-P-CCNC: 46 U/L (ref 15–37)
BACTERIA SPEC CULT: NORMAL
BILIRUB SERPL-MCNC: 0.4 MG/DL (ref 0.2–1)
BUN SERPL-MCNC: 20 MG/DL (ref 6–20)
BUN/CREAT SERPL: 16 (ref 12–20)
CA-I BLD-MCNC: 9.7 MG/DL (ref 8.5–10.1)
CHLORIDE SERPL-SCNC: 106 MMOL/L (ref 97–108)
CO2 SERPL-SCNC: 31 MMOL/L (ref 21–32)
CREAT SERPL-MCNC: 1.24 MG/DL (ref 0.7–1.3)
ERYTHROCYTE [DISTWIDTH] IN BLOOD BY AUTOMATED COUNT: 13.7 % (ref 11.5–14.5)
GLOBULIN SER CALC-MCNC: 5.9 G/DL (ref 2–4)
GLUCOSE BLD STRIP.AUTO-MCNC: 129 MG/DL (ref 65–100)
GLUCOSE BLD STRIP.AUTO-MCNC: 252 MG/DL (ref 65–100)
GLUCOSE SERPL-MCNC: 128 MG/DL (ref 65–100)
HCT VFR BLD AUTO: 37.2 % (ref 36.6–50.3)
HGB BLD-MCNC: 11.6 G/DL (ref 12.1–17)
Lab: NORMAL
MCH RBC QN AUTO: 29.9 PG (ref 26–34)
MCHC RBC AUTO-ENTMCNC: 31.2 G/DL (ref 30–36.5)
MCV RBC AUTO: 95.9 FL (ref 80–99)
NRBC # BLD: 0 K/UL (ref 0–0.01)
NRBC BLD-RTO: 0 PER 100 WBC
PERFORMED BY:: ABNORMAL
PERFORMED BY:: ABNORMAL
PLATELET # BLD AUTO: 354 K/UL (ref 150–400)
PMV BLD AUTO: 10 FL (ref 8.9–12.9)
POTASSIUM SERPL-SCNC: 4 MMOL/L (ref 3.5–5.1)
PROT SERPL-MCNC: 8.4 G/DL (ref 6.4–8.2)
RBC # BLD AUTO: 3.88 M/UL (ref 4.1–5.7)
SODIUM SERPL-SCNC: 140 MMOL/L (ref 136–145)
WBC # BLD AUTO: 5.5 K/UL (ref 4.1–11.1)

## 2024-02-19 PROCEDURE — 2700000000 HC OXYGEN THERAPY PER DAY

## 2024-02-19 PROCEDURE — 2580000003 HC RX 258: Performed by: FAMILY MEDICINE

## 2024-02-19 PROCEDURE — 94761 N-INVAS EAR/PLS OXIMETRY MLT: CPT

## 2024-02-19 PROCEDURE — 6370000000 HC RX 637 (ALT 250 FOR IP): Performed by: FAMILY MEDICINE

## 2024-02-19 PROCEDURE — 82962 GLUCOSE BLOOD TEST: CPT

## 2024-02-19 PROCEDURE — 80053 COMPREHEN METABOLIC PANEL: CPT

## 2024-02-19 PROCEDURE — 6370000000 HC RX 637 (ALT 250 FOR IP): Performed by: INTERNAL MEDICINE

## 2024-02-19 PROCEDURE — 94640 AIRWAY INHALATION TREATMENT: CPT

## 2024-02-19 PROCEDURE — 36415 COLL VENOUS BLD VENIPUNCTURE: CPT

## 2024-02-19 PROCEDURE — 85027 COMPLETE CBC AUTOMATED: CPT

## 2024-02-19 RX ORDER — INSULIN GLARGINE 100 [IU]/ML
34 INJECTION, SOLUTION SUBCUTANEOUS NIGHTLY
Qty: 10 ML | Refills: 3 | Status: SHIPPED | OUTPATIENT
Start: 2024-02-19

## 2024-02-19 RX ORDER — BUMETANIDE 1 MG/1
2 TABLET ORAL 2 TIMES DAILY
Status: DISCONTINUED | OUTPATIENT
Start: 2024-02-19 | End: 2024-02-19 | Stop reason: HOSPADM

## 2024-02-19 RX ADMIN — BUMETANIDE 1 MG: 1 TABLET ORAL at 08:16

## 2024-02-19 RX ADMIN — INSULIN LISPRO 4 UNITS: 100 INJECTION, SOLUTION INTRAVENOUS; SUBCUTANEOUS at 13:53

## 2024-02-19 RX ADMIN — OXYCODONE HYDROCHLORIDE AND ACETAMINOPHEN 1000 MG: 500 TABLET ORAL at 08:16

## 2024-02-19 RX ADMIN — GABAPENTIN 300 MG: 300 CAPSULE ORAL at 13:51

## 2024-02-19 RX ADMIN — Medication 1 CAPSULE: at 08:15

## 2024-02-19 RX ADMIN — TRAMADOL HYDROCHLORIDE 50 MG: 50 TABLET, COATED ORAL at 00:47

## 2024-02-19 RX ADMIN — SODIUM CHLORIDE, PRESERVATIVE FREE 10 ML: 5 INJECTION INTRAVENOUS at 08:16

## 2024-02-19 RX ADMIN — POTASSIUM CHLORIDE 10 MEQ: 750 TABLET, EXTENDED RELEASE ORAL at 08:15

## 2024-02-19 RX ADMIN — FLUTICASONE PROPIONATE 2 SPRAY: 50 SPRAY, METERED NASAL at 08:17

## 2024-02-19 RX ADMIN — ASPIRIN 81 MG 81 MG: 81 TABLET ORAL at 08:16

## 2024-02-19 RX ADMIN — SACUBITRIL AND VALSARTAN 1 TABLET: 24; 26 TABLET, FILM COATED ORAL at 08:15

## 2024-02-19 RX ADMIN — ALLOPURINOL 200 MG: 100 TABLET ORAL at 08:15

## 2024-02-19 RX ADMIN — Medication 2 PUFF: at 07:40

## 2024-02-19 RX ADMIN — OXYCODONE HYDROCHLORIDE 10 MG: 10 TABLET ORAL at 08:16

## 2024-02-19 RX ADMIN — POTASSIUM CHLORIDE 10 MEQ: 750 TABLET, EXTENDED RELEASE ORAL at 13:51

## 2024-02-19 RX ADMIN — COLCHICINE 0.6 MG: 0.6 TABLET, FILM COATED ORAL at 08:16

## 2024-02-19 RX ADMIN — Medication 800 UNITS: at 08:15

## 2024-02-19 RX ADMIN — IPRATROPIUM BROMIDE AND ALBUTEROL SULFATE 1 DOSE: 2.5; .5 SOLUTION RESPIRATORY (INHALATION) at 07:39

## 2024-02-19 RX ADMIN — MICONAZOLE NITRATE: 20 POWDER TOPICAL at 08:16

## 2024-02-19 RX ADMIN — GABAPENTIN 300 MG: 300 CAPSULE ORAL at 08:16

## 2024-02-19 ASSESSMENT — PAIN SCALES - GENERAL
PAINLEVEL_OUTOF10: 8
PAINLEVEL_OUTOF10: 5

## 2024-02-19 ASSESSMENT — PAIN DESCRIPTION - LOCATION
LOCATION: GENERALIZED
LOCATION: GENERALIZED

## 2024-02-19 NOTE — PROGRESS NOTES
4 Eyes Skin Assessment     NAME:  Shantanu Casillas  YOB: 1955  MEDICAL RECORD NUMBER:  [id number]    The patient is being assessed for  Admission    I agree that at least one RN has performed a thorough Head to Toe Skin Assessment on the patient. ALL assessment sites listed below have been assessed.      Areas assessed by both nurses:    Head, Face, Ears, Shoulders, Back, Chest, Arms, Elbows, Hands, Sacrum. Buttock, Coccyx, Ischium, Legs. Feet and Heels, and Under Medical Devices         Does the Patient have a Wound? Yes wound(s) were present on assessment. LDA wound assessment was Initiated and completed by RN       Homar Prevention initiated by RN: Yes  Wound Care Orders initiated by RN: Yes    Pressure Injury (Stage 3,4, Unstageable, DTI, NWPT, and Complex wounds) if present, place Wound referral order by RN under : Yes    New Ostomies, if present place, Ostomy referral order under : No     Nurse 1 eSignature: Electronically signed by Tiffani Liz RN on 2/13/24 at 7:00 PM EST    **SHARE this note so that the co-signing nurse can place an eSignature**    Nurse 2 eSignature: Electronically signed by Corrine Rios RN on 2/13/24 at 7:08 PM EST   
4 Eyes Skin Assessment     NAME:  Shantanu Casillas  YOB: 1955  MEDICAL RECORD NUMBER:  [id number]    The patient is being assessed for  Other 4 eyes    I agree that at least one RN has performed a thorough Head to Toe Skin Assessment on the patient. ALL assessment sites listed below have been assessed.      Areas assessed by both nurses:    Head, Face, Ears, Shoulders, Back, Chest, Arms, Elbows, Hands, Sacrum. Buttock, Coccyx, Ischium, Legs. Feet and Heels, and Under Medical Devices         Does the Patient have a Wound? Yes wound(s) were present on assessment. LDA wound assessment was Initiated and completed by RN       Homar Prevention initiated by RN: Yes  Wound Care Orders initiated by RN: Yes    Pressure Injury (Stage 3,4, Unstageable, DTI, NWPT, and Complex wounds) if present, place Wound referral order by RN under : Yes    New Ostomies, if present place, Ostomy referral order under : No     Nurse 1 eSignature: Electronically signed by Desirae Keith RN on 2/14/24 at 5:37 PM EST    **SHARE this note so that the co-signing nurse can place an eSignature**    Nurse 2 eSignature: Electronically signed by Corrine Rios RN on 2/14/24 at 6:10 PM EST    
General Daily Progress Note      Patient Name:   Shantanu Casillas       YOB: 1955       Age:  68 y.o.      Admit Date: 2024      Subjective:     Patient is a 68 y.o. year old male morbidly obese history of chronic venous ulceration COPD type 2 diabetes hypertension gout CHF venous stasis dermatitis here emergency room complaining of shortness of breath Jovi follow-up was 3 days patient Will cough congestion shortness of breath on little exertion seen by the ER physician workup done in the ER chest x-ray done which shows bilateral pneumonia WBC count was 14,000 troponin was negative  Patient admitted with acute community-acquired pneumonia    He started feeling ill 3 days ago. This was accompanied by a fever of 101, which he stated was high for him because his baseline temperature is under 97. The patient sleeps with a [employer] machine. Has not eaten in 3 days and has uncontrolled diarrhea. Used pepto-bismol yesterday.     Family history includes mother with DM. Osteoporosis, COPD; father  from heart disease in his 70s.     Patient denies smoking, drug use, or recent drinking. Was social drinker earlier in life. 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     CXR: Left basilar airspace disease may represent atelectasis or  pneumonia.     WBCs 14.2, Cr 1.91      Patient lying in bed, awake and able to engage fully. Doing well on 3L of oxygen by NC and no complaint of headache or dizziness. Main complaint is pain all over, but mainly with pain and tightness in neck and shoulders. His IV did not hold yesterday and was reinserted today. Appetite remains poor, but able to hold down soft foods and coffee. Sleep has been so-so. Lung and heart sounds are distant possibly d/t body habitus. Peripheral edema in UE and LE. Right leg somewhat warmer than left. Temperature 100.6               Objective:     Vitals:    24 
General Daily Progress Note      Patient Name:   Shantanu Casillas       YOB: 1955       Age:  68 y.o.      Admit Date: 2024      Subjective:     Patient is a 68 y.o. year old male morbidly obese history of chronic venous ulceration COPD type 2 diabetes hypertension gout CHF venous stasis dermatitis here emergency room complaining of shortness of breath Jovi follow-up was 3 days patient Will cough congestion shortness of breath on little exertion seen by the ER physician workup done in the ER chest x-ray done which shows bilateral pneumonia WBC count was 14,000 troponin was negative  Patient admitted with acute community-acquired pneumonia    He started feeling ill 3 days ago. This was accompanied by a fever of 101, which he stated was high for him because his baseline temperature is under 97. The patient sleeps with a [employer] machine. Has not eaten in 3 days and has uncontrolled diarrhea. Used pepto-bismol yesterday.     Family history includes mother with DM. Osteoporosis, COPD; father  from heart disease in his 70s.     Patient denies smoking, drug use, or recent drinking. Was social drinker earlier in life. 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     CXR: Left basilar airspace disease may represent atelectasis or  pneumonia.     WBCs 14.2, Cr 1.91      Patient lying in bed, awake and able to engage fully. Doing well on 3L of oxygen by NC and no complaint of headache or dizziness. Main complaint is pain all over, but mainly with pain and tightness in neck and shoulders. His IV did not hold yesterday and was reinserted today. Appetite remains poor, but able to hold down soft foods and coffee. Sleep has been so-so. Lung and heart sounds are distant possibly d/t body habitus. Peripheral edema in UE and LE. Right leg somewhat warmer than left. Temperature 100.6    2/15    Patient alert awake denies any chest 
General Daily Progress Note      Patient Name:   Shantanu Casillas       YOB: 1955       Age:  68 y.o.      Admit Date: 2024      Subjective:     Patient is a 68 y.o. year old male morbidly obese history of chronic venous ulceration COPD type 2 diabetes hypertension gout CHF venous stasis dermatitis here emergency room complaining of shortness of breath Jovi follow-up was 3 days patient Will cough congestion shortness of breath on little exertion seen by the ER physician workup done in the ER chest x-ray done which shows bilateral pneumonia WBC count was 14,000 troponin was negative  Patient admitted with acute community-acquired pneumonia    He started feeling ill 3 days ago. This was accompanied by a fever of 101, which he stated was high for him because his baseline temperature is under 97. The patient sleeps with a [employer] machine. Has not eaten in 3 days and has uncontrolled diarrhea. Used pepto-bismol yesterday.     Family history includes mother with DM. Osteoporosis, COPD; father  from heart disease in his 70s.     Patient denies smoking, drug use, or recent drinking. Was social drinker earlier in life. 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     CXR: Left basilar airspace disease may represent atelectasis or  pneumonia.     WBCs 14.2, Cr 1.91      Patient lying in bed, awake and able to engage fully. Doing well on 3L of oxygen by NC and no complaint of headache or dizziness. Main complaint is pain all over, but mainly with pain and tightness in neck and shoulders. His IV did not hold yesterday and was reinserted today. Appetite remains poor, but able to hold down soft foods and coffee. Sleep has been so-so. Lung and heart sounds are distant possibly d/t body habitus. Peripheral edema in UE and LE. Right leg somewhat warmer than left. Temperature 100.6    2/15    Patient alert awake denies any chest 
PT orders received and acknowledged.  PT attempted to see pt for PT evaluation, pt currently off the floor at ultrasound for doppler.  Will attempt PT eval again at a later time.  Thank you!  
PULMONARY NOTE  [facility]    Name: Shantanu Casillas MRN: [id number]   : 1955 Hospital: [facility]   Date: 2/15/2024  Admission date: 2024 Hospital Day: 4       HPI:     Patient Active Problem List   Diagnosis    Onychomycosis    Non-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 (HCC)    Idiopathic chronic venous hypertension of left lower extremity with ulcer (HCC)    Ulcer of left foot, with fat layer exposed (HCC)    Cellulitis    COVID-19    Venous ulcer (HCC)    Hyperkeratosis    Localized edema    Type 2 diabetes mellitus with diabetic polyneuropathy, with long-term current use of insulin (HCC)    Cellulitis and abscess of right leg    Ulcer of right heel, with fat layer exposed (HCC)    Calf ulcer, left, with fat layer exposed (HCC)    Ankle ulcer, right, with fat layer exposed (HCC)    Non-pressure chronic ulcer of other part of right lower leg with fat layer exposed (HCC)    DEMARCO (acute kidney injury) (HCC)    Back pain    Acute on chronic systolic CHF (congestive heart failure), NYHA class 2 (HCC)    Shortness of breath    Acute on chronic congestive heart failure (HCC)    Wound infection    Venous stasis ulcer (HCC)    Open wound of right foot    Atherosclerotic heart disease of native coronary artery without angina pectoris    Chronic obstructive pulmonary disease, unspecified (HCC)    Chronic pain disorder    Dependence on supplemental oxygen    Difficulty in walking, not elsewhere classified    Gout, unspecified    Hypertensive heart disease with heart failure (HCC)    Morbid (severe) obesity due to excess calories (HCC)    Muscle weakness (generalized)    Obstructive sleep apnea (adult) (pediatric)    Other lack of coordination    Other symptoms and signs involving the musculoskeletal system    Personal history of COVID-19    Presence of cardiac pacemaker    Type 2 diabetes mellitus with diabetic peripheral angiopathy without gangrene 
Patient has Venous statis ulcer to LLE, there is a note from 13th from wound care nurse that it was cleansed with wound cleanser, calcium alginate Ag, covered with dry sterile dressing on the 13th. There are no wound care orders. During wound assessment, Patient's right heel is pink dry and tender to the touch per patient. There is a note from wound care note that Thera honey gel and cover with dry sterile on 13th. Wound cleanser, Thera honey gel was placed covered sterile dressing covered with Ace wrap on 2/16/2024 by primary nurse. Notified Dr. Oviedo. No new orders at this time.     
Patient's family is bringing his trilogy to the hospital.  
Renal Progress Note    Patient: Shantanu Casillas MRN: [id number]  SSN: [id number]    YOB: 1955  Age: 68 y.o.  Sex: male      Admit Date: 2/12/2024    LOS: 1 day     Subjective:   Patient seen at bedside, awake, alert, no acute distress.  Reports continued shortness of breath.    Creatinine improving to 1.57 today.    Current Facility-Administered Medications   Medication Dose Route Frequency    traMADol (ULTRAM) tablet 50 mg  50 mg Oral Q6H PRN    albuterol sulfate HFA (PROVENTIL;VENTOLIN;PROAIR) 108 (90 Base) MCG/ACT inhaler 1 puff  1 puff Inhalation Q4H PRN    allopurinol (ZYLOPRIM) tablet 200 mg  200 mg Oral Daily    ammonium lactate (AMLACTIN) 12 % cream   Topical PRN    ascorbic acid (VITAMIN C) tablet 1,000 mg  1,000 mg Oral BID    aspirin chewable tablet 81 mg  81 mg Oral Daily    atorvastatin (LIPITOR) tablet 40 mg  40 mg Oral Nightly    b complex vitamins capsule 1 capsule  1 capsule Oral Daily    [Held by provider] bumetanide (BUMEX) tablet 2 mg  2 mg Oral BID    colchicine (COLCRYS) tablet 0.6 mg  0.6 mg Oral Daily    [Held by provider] sacubitril-valsartan (ENTRESTO) 24-26 MG per tablet 1 tablet  1 tablet Oral BID    fluticasone (FLONASE) 50 MCG/ACT nasal spray 2 spray  2 spray Each Nostril Daily    gabapentin (NEURONTIN) capsule 300 mg  300 mg Oral 4x Daily    [Held by provider] metOLazone (ZAROXOLYN) tablet 5 mg  5 mg Oral Daily    oxyCODONE (ROXICODONE) immediate release tablet 5 mg  5 mg Oral BID    [Held by provider] potassium chloride (KLOR-CON) extended release tablet 10 mEq  10 mEq Oral 4x Daily    vitamin E capsule 800 Units  800 Units Oral BID    insulin glargine (LANTUS) injection vial 34 Units  34 Units SubCUTAneous Nightly    insulin lispro (HUMALOG) injection vial 0-16 Units  0-16 Units SubCUTAneous TID WC    insulin lispro (HUMALOG) injection vial 0-4 Units  0-4 Units SubCUTAneous Nightly    glucose chewable tablet 16 g  4 tablet Oral PRN    dextrose bolus 10% 125 mL  125 
Renal Progress Note    Patient: Shantanu Casillas MRN: [id number]  SSN: [id number]    YOB: 1955  Age: 68 y.o.  Sex: male      Admit Date: 2/12/2024    LOS: 3 days     Subjective:   Patient seen in room sitting in bedside chair awake, alert, no acute distress.  On home oxygen at baseline.     Creatinine improving to 1.57-->1.3 today.    Current Facility-Administered Medications   Medication Dose Route Frequency    oxyCODONE HCl (OXY-IR) immediate release tablet 10 mg  10 mg Oral BID    ipratropium 0.5 mg-albuterol 2.5 mg (DUONEB) nebulizer solution 1 Dose  1 Dose Inhalation BID RT    miconazole (MICOTIN) 2 % powder   Topical BID    traMADol (ULTRAM) tablet 50 mg  50 mg Oral Q6H PRN    albuterol sulfate HFA (PROVENTIL;VENTOLIN;PROAIR) 108 (90 Base) MCG/ACT inhaler 1 puff  1 puff Inhalation Q4H PRN    allopurinol (ZYLOPRIM) tablet 200 mg  200 mg Oral Daily    ammonium lactate (AMLACTIN) 12 % cream   Topical PRN    ascorbic acid (VITAMIN C) tablet 1,000 mg  1,000 mg Oral BID    aspirin chewable tablet 81 mg  81 mg Oral Daily    atorvastatin (LIPITOR) tablet 40 mg  40 mg Oral Nightly    b complex vitamins capsule 1 capsule  1 capsule Oral Daily    [Held by provider] bumetanide (BUMEX) tablet 2 mg  2 mg Oral BID    colchicine (COLCRYS) tablet 0.6 mg  0.6 mg Oral Daily    [Held by provider] sacubitril-valsartan (ENTRESTO) 24-26 MG per tablet 1 tablet  1 tablet Oral BID    fluticasone (FLONASE) 50 MCG/ACT nasal spray 2 spray  2 spray Each Nostril Daily    gabapentin (NEURONTIN) capsule 300 mg  300 mg Oral 4x Daily    [Held by provider] metOLazone (ZAROXOLYN) tablet 5 mg  5 mg Oral Daily    potassium chloride (KLOR-CON) extended release tablet 10 mEq  10 mEq Oral 4x Daily    vitamin E capsule 800 Units  800 Units Oral BID    insulin glargine (LANTUS) injection vial 34 Units  34 Units SubCUTAneous Nightly    insulin lispro (HUMALOG) injection vial 0-16 Units  0-16 Units SubCUTAneous TID WC    insulin lispro 
Renal Progress Note    Patient: Shantanu Casillas MRN: [id number]  SSN: [id number]    YOB: 1955  Age: 68 y.o.  Sex: male      Admit Date: 2/12/2024    LOS: 4 days     Subjective:   Patient seen in room sitting in bedside chair awake, alert, no acute distress.  On home oxygen at baseline.     Creatinine improving to 1.57-->1.2 today.    Current Facility-Administered Medications   Medication Dose Route Frequency    oxyCODONE HCl (OXY-IR) immediate release tablet 10 mg  10 mg Oral BID    ipratropium 0.5 mg-albuterol 2.5 mg (DUONEB) nebulizer solution 1 Dose  1 Dose Inhalation BID RT    miconazole (MICOTIN) 2 % powder   Topical BID    traMADol (ULTRAM) tablet 50 mg  50 mg Oral Q6H PRN    albuterol sulfate HFA (PROVENTIL;VENTOLIN;PROAIR) 108 (90 Base) MCG/ACT inhaler 1 puff  1 puff Inhalation Q4H PRN    allopurinol (ZYLOPRIM) tablet 200 mg  200 mg Oral Daily    ammonium lactate (AMLACTIN) 12 % cream   Topical PRN    ascorbic acid (VITAMIN C) tablet 1,000 mg  1,000 mg Oral BID    aspirin chewable tablet 81 mg  81 mg Oral Daily    atorvastatin (LIPITOR) tablet 40 mg  40 mg Oral Nightly    b complex vitamins capsule 1 capsule  1 capsule Oral Daily    [Held by provider] bumetanide (BUMEX) tablet 2 mg  2 mg Oral BID    colchicine (COLCRYS) tablet 0.6 mg  0.6 mg Oral Daily    [Held by provider] sacubitril-valsartan (ENTRESTO) 24-26 MG per tablet 1 tablet  1 tablet Oral BID    fluticasone (FLONASE) 50 MCG/ACT nasal spray 2 spray  2 spray Each Nostril Daily    gabapentin (NEURONTIN) capsule 300 mg  300 mg Oral 4x Daily    [Held by provider] metOLazone (ZAROXOLYN) tablet 5 mg  5 mg Oral Daily    potassium chloride (KLOR-CON) extended release tablet 10 mEq  10 mEq Oral 4x Daily    vitamin E capsule 800 Units  800 Units Oral BID    insulin glargine (LANTUS) injection vial 34 Units  34 Units SubCUTAneous Nightly    insulin lispro (HUMALOG) injection vial 0-16 Units  0-16 Units SubCUTAneous TID WC    insulin lispro 
Renal Progress Note    Patient: Shantanu Casillas MRN: [id number]  SSN: [id number]    YOB: 1955  Age: 68 y.o.  Sex: male      Admit Date: 2/12/2024    LOS: 6 days     Subjective:   Patient seen in room, awake, alert, no acute distress.  On home oxygen at baseline.     Creatinine stable at 1.2.    Current Facility-Administered Medications   Medication Dose Route Frequency    bumetanide (BUMEX) tablet 1 mg  1 mg Oral BID    oxyCODONE HCl (OXY-IR) immediate release tablet 10 mg  10 mg Oral BID    ipratropium 0.5 mg-albuterol 2.5 mg (DUONEB) nebulizer solution 1 Dose  1 Dose Inhalation BID RT    miconazole (MICOTIN) 2 % powder   Topical BID    traMADol (ULTRAM) tablet 50 mg  50 mg Oral Q6H PRN    albuterol sulfate HFA (PROVENTIL;VENTOLIN;PROAIR) 108 (90 Base) MCG/ACT inhaler 1 puff  1 puff Inhalation Q4H PRN    allopurinol (ZYLOPRIM) tablet 200 mg  200 mg Oral Daily    ammonium lactate (AMLACTIN) 12 % cream   Topical PRN    ascorbic acid (VITAMIN C) tablet 1,000 mg  1,000 mg Oral BID    aspirin chewable tablet 81 mg  81 mg Oral Daily    atorvastatin (LIPITOR) tablet 40 mg  40 mg Oral Nightly    b complex vitamins capsule 1 capsule  1 capsule Oral Daily    colchicine (COLCRYS) tablet 0.6 mg  0.6 mg Oral Daily    sacubitril-valsartan (ENTRESTO) 24-26 MG per tablet 1 tablet  1 tablet Oral BID    fluticasone (FLONASE) 50 MCG/ACT nasal spray 2 spray  2 spray Each Nostril Daily    gabapentin (NEURONTIN) capsule 300 mg  300 mg Oral 4x Daily    [Held by provider] metOLazone (ZAROXOLYN) tablet 5 mg  5 mg Oral Daily    potassium chloride (KLOR-CON) extended release tablet 10 mEq  10 mEq Oral 4x Daily    vitamin E capsule 800 Units  800 Units Oral BID    insulin glargine (LANTUS) injection vial 34 Units  34 Units SubCUTAneous Nightly    insulin lispro (HUMALOG) injection vial 0-16 Units  0-16 Units SubCUTAneous TID WC    insulin lispro (HUMALOG) injection vial 0-4 Units  0-4 Units SubCUTAneous Nightly    glucose 
Renal Progress Note    Patient: Shantanu Casillas MRN: [id number]  SSN: [id number]    YOB: 1955  Age: 68 y.o.  Sex: male      Admit Date: 2/12/2024    LOS: 2 days     Subjective:   Patient seen at bedside, awake, alert, no acute distress.  Reports continued shortness of breath.  On home oxygen at baseline.     Creatinine improving to 1.57-->1.35 today.    Current Facility-Administered Medications   Medication Dose Route Frequency    miconazole (MICOTIN) 2 % powder   Topical BID    traMADol (ULTRAM) tablet 50 mg  50 mg Oral Q6H PRN    albuterol sulfate HFA (PROVENTIL;VENTOLIN;PROAIR) 108 (90 Base) MCG/ACT inhaler 1 puff  1 puff Inhalation Q4H PRN    allopurinol (ZYLOPRIM) tablet 200 mg  200 mg Oral Daily    ammonium lactate (AMLACTIN) 12 % cream   Topical PRN    ascorbic acid (VITAMIN C) tablet 1,000 mg  1,000 mg Oral BID    aspirin chewable tablet 81 mg  81 mg Oral Daily    atorvastatin (LIPITOR) tablet 40 mg  40 mg Oral Nightly    b complex vitamins capsule 1 capsule  1 capsule Oral Daily    [Held by provider] bumetanide (BUMEX) tablet 2 mg  2 mg Oral BID    colchicine (COLCRYS) tablet 0.6 mg  0.6 mg Oral Daily    [Held by provider] sacubitril-valsartan (ENTRESTO) 24-26 MG per tablet 1 tablet  1 tablet Oral BID    fluticasone (FLONASE) 50 MCG/ACT nasal spray 2 spray  2 spray Each Nostril Daily    gabapentin (NEURONTIN) capsule 300 mg  300 mg Oral 4x Daily    [Held by provider] metOLazone (ZAROXOLYN) tablet 5 mg  5 mg Oral Daily    oxyCODONE (ROXICODONE) immediate release tablet 5 mg  5 mg Oral BID    potassium chloride (KLOR-CON) extended release tablet 10 mEq  10 mEq Oral 4x Daily    vitamin E capsule 800 Units  800 Units Oral BID    insulin glargine (LANTUS) injection vial 34 Units  34 Units SubCUTAneous Nightly    insulin lispro (HUMALOG) injection vial 0-16 Units  0-16 Units SubCUTAneous TID WC    insulin lispro (HUMALOG) injection vial 0-4 Units  0-4 Units SubCUTAneous Nightly    glucose 
(HCC)    Unspecified cirrhosis of liver (HCC)    Unspecified osteoarthritis, unspecified site    Venous insufficiency (chronic) (peripheral)    Acute pneumonia    CAP (community acquired pneumonia) due to Chlamydia species             [x] High complexity decision making was performed  [x] See my orders for details      Subjective/Initial History:     I was asked by Ed Pino MD to see Shantanu Casillas  a 68 y.o.   male in consultation     Excerpts from admission 2/12/2024 or consult notes as follows:   68-year-old male came in because of chest pain associated with shortness of breath significant past medical history of COPD type 2 diabetes mellitus hypertension congestive heart failure chronic venous stasis dermatitis and also has gout and obstructive sleep apnea on and because of chronic respiratory failure he is on trilogy noninvasive ventilator he was not doing well more short of breath dyspneic and was having chest pain chest x-ray shows bilateral infiltrate and he is chronically on home oxygen 2 L nasal cannula      Allergies   Allergen Reactions    Amitriptyline Angioedema    Naproxen      Other reaction(s): Unknown (comments)  Pt not sure of reaction    Sulfa Antibiotics Nausea And Vomiting        MAR reviewed and pertinent medications noted or modified as needed     Current Facility-Administered Medications   Medication Dose Route Frequency Provider Last Rate Last Admin    bumetanide (BUMEX) tablet 1 mg  1 mg Oral BID Bhupendra Hayden MD        oxyCODONE HCl (OXY-IR) immediate release tablet 10 mg  10 mg Oral BID Ed Pino MD   10 mg at 02/17/24 0828    ipratropium 0.5 mg-albuterol 2.5 mg (DUONEB) nebulizer solution 1 Dose  1 Dose Inhalation BID RT Yeison Fraire MD   1 Dose at 02/17/24 0752    miconazole (MICOTIN) 2 % powder   Topical BID Ed Pino MD   Given at 02/17/24 0832    traMADol (ULTRAM) tablet 50 mg  50 mg Oral Q6H PRN Ed Pino MD   50 mg at 02/17/24 1332 
(HCC)    Unspecified cirrhosis of liver (HCC)    Unspecified osteoarthritis, unspecified site    Venous insufficiency (chronic) (peripheral)    Acute pneumonia    CAP (community acquired pneumonia) due to Chlamydia species             [x] High complexity decision making was performed  [x] See my orders for details      Subjective/Initial History:     I was asked by Ed Pino MD to see Shantanu Casillas  a 68 y.o.   male in consultation     Excerpts from admission 2/12/2024 or consult notes as follows:   68-year-old male came in because of chest pain associated with shortness of breath significant past medical history of COPD type 2 diabetes mellitus hypertension congestive heart failure chronic venous stasis dermatitis and also has gout and obstructive sleep apnea on and because of chronic respiratory failure he is on trilogy noninvasive ventilator he was not doing well more short of breath dyspneic and was having chest pain chest x-ray shows bilateral infiltrate and he is chronically on home oxygen 2 L nasal cannula      Allergies   Allergen Reactions    Amitriptyline Angioedema    Naproxen      Other reaction(s): Unknown (comments)  Pt not sure of reaction    Sulfa Antibiotics Nausea And Vomiting        MAR reviewed and pertinent medications noted or modified as needed     Current Facility-Administered Medications   Medication Dose Route Frequency Provider Last Rate Last Admin    oxyCODONE HCl (OXY-IR) immediate release tablet 10 mg  10 mg Oral BID Ed Pino MD   10 mg at 02/16/24 0839    miconazole (MICOTIN) 2 % powder   Topical BID Ed Pino MD   Given at 02/16/24 0841    traMADol (ULTRAM) tablet 50 mg  50 mg Oral Q6H PRN Ed Pino MD   50 mg at 02/15/24 1736    albuterol sulfate HFA (PROVENTIL;VENTOLIN;PROAIR) 108 (90 Base) MCG/ACT inhaler 1 puff  1 puff Inhalation Q4H PRN Ed Pino MD        allopurinol (ZYLOPRIM) tablet 200 mg  200 mg Oral Daily Alvarez 
31 21 - 32 mmol/L    Anion Gap 3 (L) 5 - 15 mmol/L    Glucose 128 (H) 65 - 100 mg/dL    BUN 20 6 - 20 mg/dL    Creatinine 1.24 0.70 - 1.30 mg/dL    Bun/Cre Ratio 16 12 - 20      Est, Glom Filt Rate >60 >60 ml/min/1.73m2    Calcium 9.7 8.5 - 10.1 mg/dL    Total Bilirubin 0.4 0.2 - 1.0 mg/dL    AST 46 (H) 15 - 37 U/L    ALT 59 12 - 78 U/L    Alk Phosphatase 95 45 - 117 U/L    Total Protein 8.4 (H) 6.4 - 8.2 g/dL    Albumin 2.5 (L) 3.5 - 5.0 g/dL    Globulin 5.9 (H) 2.0 - 4.0 g/dL    Albumin/Globulin Ratio 0.4 (L) 1.1 - 2.2       [unfilled]        Review of Systems    Constitutional: Negative for chills and fever.   HENT: Negative.    Eyes: Negative.    Respiratory: Negative.    Cardiovascular: Negative.    Gastrointestinal: Negative for abdominal pain and nausea.   Skin: Negative.    Neurological: Negative.        Physical Exam:      Constitutional: pt is oriented to person, place, and time.   HENT:   Head: Normocephalic and atraumatic.   Eyes: Pupils are equal, round, and reactive to light. EOM are normal.   Cardiovascular: Distant heart sounds   Pulmonary/Chest: Breath sounds normal. Distant lung sounds  Exhibits no tenderness.   Abdominal: Soft. Bowel sounds are normal. There is no abdominal tenderness. There is no rebound and no guarding.   Musculoskeletal: Aches, pains, knots, especially in neck and shoulders  Neurological: pt is alert and oriented to person, place, and time.     Vascular duplex lower extremity venous bilateral   Final Result      XR THORACIC SPINE (3 VIEWS)   Final Result      No acute spinal abnormality. Chronic L2 wedging deformity.          XR CERVICAL SPINE (2-3 VIEWS)   Final Result      No acute spinal abnormality. Chronic L2 wedging deformity.          XR LUMBAR SPINE (2-3 VIEWS)   Final Result      No acute spinal abnormality. Chronic L2 wedging deformity.          XR CHEST 1 VIEW   Final Result   Left basilar airspace disease may represent atelectasis or   pneumonia.          
>60 ml/min/1.73m2    Calcium 9.6 8.5 - 10.1 mg/dL    Total Bilirubin 0.4 0.2 - 1.0 mg/dL    AST 59 (H) 15 - 37 U/L    ALT 55 12 - 78 U/L    Alk Phosphatase 92 45 - 117 U/L    Total Protein 8.0 6.4 - 8.2 g/dL    Albumin 2.4 (L) 3.5 - 5.0 g/dL    Globulin 5.6 (H) 2.0 - 4.0 g/dL    Albumin/Globulin Ratio 0.4 (L) 1.1 - 2.2          Assessment and Plan:     1. Acute Kidney Injury:   -prerenal azotemia secondary to volume depletion in the setting of home use of Bumex, Zaroxolyn, and Entresto  -Will resume Entresto and bumex   -Urinalysis bland, no proteinuria  -No rhabdo  -Echo shows EF of 40 to 45%, grade 1 diastolic dysfunction, 6/2023  -Will continue to monitor renal functions and adjust management as needed    2. Hypertension: Controlled.  -Maintain salt restriction.   -Will continue to monitor and blood pressures and adjust antihypertensive regimen as needed.    3. Lower extremity edema with chronic wounds: probably related to ?proteinuria/congestive heart   -Diuretics on hold for now  -1500 cc fluid restriction  -Check I/Os, daily weights  -Will monitor fluid status clinically and adjust diuretics as needed.   -Venous Doppler BLE negative for thrombus    4. Diabetes mellitus: stable.   -A1c 7.0%  -Management per primary      5. CAP  -Pulmonology on board  -Antibiotics per primary     6. Gout  -On allopurinol and colchicine     7.  Hypokalemia, resolved  -Potassium stable    Signed By: Bhupendra Hayden MD     February 18, 2024      
MD        Petrolatum-Zinc Oxide ointment   Topical TID Ed Pino MD   Given at 24 0817      Patient PCP: Ed Pino MD  PMH:  has a past medical history of Arthritis, CAD (coronary artery disease), Chronic obstructive pulmonary disease (HCC), Diabetes (HCC), Gout, Hypertension, Ill-defined condition, and Sleep apnea.  PSH:   has a past surgical history that includes pacemaker; hx vein ablation adhesive; orthopedic surgery; Foot surgery; and Foot Debridement (Right, 12/15/2023).   FHX: family history includes Diabetes in his brother, mother, and sister; Heart Disease in his father and mother; Hypertension in his father and mother; Kidney Disease in his mother.   SHX:  reports that he has never smoked. He has never used smokeless tobacco. He reports that he does not currently use alcohol. He reports that he does not use drugs.     ROS:    Review of system as per HPI and physical exam       Objective:     Vital Signs: Telemetry:    normal sinus rhythm Intake/Output:   /78   Pulse 80   Temp 98.6 °F (37 °C) (Oral)   Resp 20   Ht 1.803 m (5' 11\")   Wt (!) 171.7 kg (378 lb 8.5 oz)   SpO2 100%   BMI 52.79 kg/m²     Temp (24hrs), Av.9 °F (36.6 °C), Min:97.3 °F (36.3 °C), Max:98.6 °F (37 °C)          O2 Device: PAP (positive airway pressure) (Simultaneous filing. User may not have seen previous data.) O2 Flow Rate (L/min): 3 L/min (Simultaneous filing. User may not have seen previous data.)     Wt Readings from Last 4 Encounters:   24 (!) 171.7 kg (378 lb 8.5 oz)   23 (!) 169.2 kg (373 lb)   23 (!) 164.7 kg (363 lb)   10/17/23 (!) 162.8 kg (358 lb 14.4 oz)          Intake/Output Summary (Last 24 hours) at 2024 0999  Last data filed at 2024 0591  Gross per 24 hour   Intake --   Output 1200 ml   Net -1200 ml         Last shift:      No intake/output data recorded.  Last 3 shifts:  1901 -  0700  In: 480 [P.O.:480]  Out: 2100 [Urine:2100]       Physical 
[HELD]  Ceftriaxone 1,000 mg in sterile water 10 mL IV qd  Colchicine 0.6 mg po qd  Enoxaparin 40 mg subcu bid  Fluticasone 50 mcg/act nasal spray 2 sprays each nostril bid  Gabapentin 300 mg po qid  Lantus 34 units subcu qhd  Humalog 0-16 units subcu tid with meals  Humalog 0-4 units subcu qhd  Duoneb inhalation every 6 hours while awake  Merolazone 5 mg po qd [HELD]  Oxycodone 5 mg po bid  KCl ER 10 mEq po qid [HELD]  Entresto 24-26 mg po bid [HELD]  Vitamin b complex po qd  Vitamin e 800 units    Continue current regimen  Consider duplex US for right leg        Current Facility-Administered Medications:     traMADol (ULTRAM) tablet 50 mg, 50 mg, Oral, Q6H PRN, Ed Pino MD, 50 mg at 02/13/24 1141    albuterol sulfate HFA (PROVENTIL;VENTOLIN;PROAIR) 108 (90 Base) MCG/ACT inhaler 1 puff, 1 puff, Inhalation, Q4H PRN, Ed Pino MD    allopurinol (ZYLOPRIM) tablet 200 mg, 200 mg, Oral, Daily, Ed Pino MD, 200 mg at 02/14/24 0857    ammonium lactate (AMLACTIN) 12 % cream, , Topical, PRN, Ed Pino MD    ascorbic acid (VITAMIN C) tablet 1,000 mg, 1,000 mg, Oral, BID, Ed Pino MD, 1,000 mg at 02/14/24 0856    aspirin chewable tablet 81 mg, 81 mg, Oral, Daily, Ed Pino MD, 81 mg at 02/14/24 0857    atorvastatin (LIPITOR) tablet 40 mg, 40 mg, Oral, Nightly, Ed Pino MD, 40 mg at 02/13/24 2123    b complex vitamins capsule 1 capsule, 1 capsule, Oral, Daily, Ed Pino MD, 1 capsule at 02/14/24 0856    [Held by provider] bumetanide (BUMEX) tablet 2 mg, 2 mg, Oral, BID, Ed Pino MD, 2 mg at 02/13/24 1142    colchicine (COLCRYS) tablet 0.6 mg, 0.6 mg, Oral, Daily, Ed Pino MD, 0.6 mg at 02/14/24 0857    [Held by provider] sacubitril-valsartan (ENTRESTO) 24-26 MG per tablet 1 tablet, 1 tablet, Oral, BID, Ed Pino MD, 1 tablet at 02/13/24 1143    fluticasone (FLONASE) 50 MCG/ACT nasal spray 2 spray, 2 spray, Each Nostril, Daily, 
2/13/2024  INDICATION: Chest and back pain. Cough. Fever. COMPARISON: August 23, 2023 FINDINGS: AP portable imaging of the chest performed at 12:08 AM demonstrates a stable cardiomediastinal silhouette. The thoracic aorta remains tortuous and atherosclerotic. Left-sided AICD is stable in position. There is mild left basilar airspace disease. The lungs are clear bilaterally. No significant osseous abnormalities are seen.     Left basilar airspace disease may represent atelectasis or pneumonia.        ARTERIAL BLOOD GAS  Recent Labs     02/13/24  1226   PHART 7.45   UZM8LNW 41   PO2ART 85   TWL0SWV 28*   BEART 3.4*   FIO2A 28.0   V2IOFXCB 96   OXYHEM 95.2   CARBOXHGBART 0.8*   METHGBART 0.2   TEMP 98.1     No results found for this or any previous visit.    IMPRESSION:   Acute on chronic respiratory failure with Hypoxia   Left lower lobe pneumonia  J96.20 Acute and chronic respiratory failure, unspecified whether with hypoxia or hypercapnia   G47.33 Obstructive sleep apnea (adult) (pediatric)   J44.9 Chronic obstructive pulmonary disease, unspecified   Z86.16 Personal history of COVID-19  Pt is requiring Drug therapy requiring intensive monitoring for toxicity  Pt is unstable, unpredictable needing inpatient monitoring; is acutely ill and at high risk of sudden decline and decompensation with severe consequenses and continued end organ dysfunction and failure  Prognosis guarded       RECOMMENDATIONS/PLAN:     68-year-old male came in because of chest pain and shortness of breath is chronically on home oxygen and also on trilogy noninvasive ventilator  He is on trilogy noninvasive ventilator at night compliant with it  Personal history of COVID19  Not actively wheezing  Chest x-ray shows left lower lobe infiltrate on antibiotic Rocephin and Zithromax  [facility] treated for covid 19 for covid 19 vaccination  Chronic leg edema  Patient was seen by infectious disease 
6 - 20 mg/dL    Creatinine 1.35 (H) 0.70 - 1.30 mg/dL    Bun/Cre Ratio 18 12 - 20      Est, Glom Filt Rate 57 (L) >60 ml/min/1.73m2    Calcium 8.8 8.5 - 10.1 mg/dL    Total Bilirubin 0.9 0.2 - 1.0 mg/dL    AST 45 (H) 15 - 37 U/L    ALT 32 12 - 78 U/L    Alk Phosphatase 89 45 - 117 U/L    Total Protein 7.0 6.4 - 8.2 g/dL    Albumin Quantity not sufficient, suggest recollection 3.5 - 5.0 g/dL    Globulin Not calculated 2.0 - 4.0 g/dL    Albumin/Globulin Ratio Not calculated 1.1 - 2.2     POCT Glucose    Collection Time: 02/15/24  8:04 AM   Result Value Ref Range    POC Glucose 120 (H) 65 - 100 mg/dL    Performed by: Agustín Mcneil        Results       Procedure Component Value Units Date/Time    Culture, Blood 2 [[id number]] Collected: 02/13/24 2204    Order Status: Sent Specimen: Blood Updated: 02/13/24 2228             XR CHEST 1 VIEW    Result Date: 2/13/2024  Left basilar airspace disease may represent atelectasis or pneumonia.          Labs:     Recent Labs     02/14/24  1354 02/15/24  0501   WBC 12.0* 10.7   HGB 10.1* 10.6*   HCT 31.6* 32.2*    175       Recent Labs     02/12/24  2346 02/14/24  1354 02/15/24  0501    137  136 136   K 3.9 3.0*  3.0* 3.8    101  99 104   CO2 28 29  30 25   BUN 39* 28*  28* 24*   CREATININE 1.91* 1.58*  1.57* 1.35*   GLUCOSE 164* 193*  186* 97   CALCIUM 8.6 8.5  8.5 8.8   PHOS  --  3.3  --        Recent Labs     02/12/24  2346 02/14/24  1354 02/15/24  0501   AST 32 38* 45*   ALT 28 33 32   ALKPHOS 79 84 89   BILITOT 0.9 1.3* 0.9   LABALBU 2.7* 2.4*  2.3* Quantity not sufficient, suggest recollection   GLOB 4.0 4.5* Not calculated       No results for input(s): \"INR\", \"PROTIME\", \"APTT\" in the last 72 hours.   No results for input(s): \"IRON\", \"TIBC\", \"FERRITIN\" in the last 72 hours.    Invalid input(s): \"PSAT\"   No results found for: \"AASISCFM98\", \"FOLATE\", \"RBCF\"   No results for input(s): \"PH\", \"PCO2\", \"PO2\" in the last 72 hours.  Recent Labs     
cream, , Topical, PRN, Ed Pino MD    ascorbic acid (VITAMIN C) tablet 1,000 mg, 1,000 mg, Oral, BID, Ed Pino MD, 1,000 mg at 02/18/24 0936    aspirin chewable tablet 81 mg, 81 mg, Oral, Daily, Ed Pino MD, 81 mg at 02/18/24 0938    atorvastatin (LIPITOR) tablet 40 mg, 40 mg, Oral, Nightly, Ed Pino MD, 40 mg at 02/17/24 2139    b complex vitamins capsule 1 capsule, 1 capsule, Oral, Daily, Ed Pino MD, 1 capsule at 02/18/24 0937    colchicine (COLCRYS) tablet 0.6 mg, 0.6 mg, Oral, Daily, Ed Pino MD, 0.6 mg at 02/18/24 0938    sacubitril-valsartan (ENTRESTO) 24-26 MG per tablet 1 tablet, 1 tablet, Oral, BID, Ed Pino MD, 1 tablet at 02/18/24 0936    fluticasone (FLONASE) 50 MCG/ACT nasal spray 2 spray, 2 spray, Each Nostril, Daily, Ed Pino MD, 2 spray at 02/18/24 0939    gabapentin (NEURONTIN) capsule 300 mg, 300 mg, Oral, 4x Daily, Ed Pino MD, 300 mg at 02/18/24 1202    [Held by provider] metOLazone (ZAROXOLYN) tablet 5 mg, 5 mg, Oral, Daily, Ed Pino MD, 5 mg at 02/13/24 1143    potassium chloride (KLOR-CON) extended release tablet 10 mEq, 10 mEq, Oral, 4x Daily, Ed Pino MD, 10 mEq at 02/18/24 1158    vitamin E capsule 800 Units, 800 Units, Oral, BID, Ed Pino MD, 800 Units at 02/18/24 0937    insulin glargine (LANTUS) injection vial 34 Units, 34 Units, SubCUTAneous, Nightly, Ed Pino MD, 34 Units at 02/17/24 2139    insulin lispro (HUMALOG) injection vial 0-16 Units, 0-16 Units, SubCUTAneous, TID WC, Ed Pino MD, 4 Units at 02/18/24 1158    insulin lispro (HUMALOG) injection vial 0-4 Units, 0-4 Units, SubCUTAneous, Nightly, Ed Pino MD    glucose chewable tablet 16 g, 4 tablet, Oral, PRN, Ed Pino MD    dextrose bolus 10% 125 mL, 125 mL, IntraVENous, PRN **OR** dextrose bolus 10% 250 mL, 250 mL, IntraVENous, PRN, Ed Pino MD    glucagon injection 1 mg, 1 mg, 
**OR** potassium bicarb-citric acid (EFFER-K) effervescent tablet 40 mEq, 40 mEq, Oral, PRN **OR** potassium chloride 10 mEq/100 mL IVPB (Peripheral Line), 10 mEq, IntraVENous, PRN, Ed Pino MD    magnesium sulfate 2000 mg in 50 mL IVPB premix, 2,000 mg, IntraVENous, PRN, Ed Pino MD    enoxaparin (LOVENOX) injection 40 mg, 40 mg, SubCUTAneous, BID, Ed Pino MD, 40 mg at 02/15/24 2046    ondansetron (ZOFRAN-ODT) disintegrating tablet 4 mg, 4 mg, Oral, Q8H PRN **OR** ondansetron (ZOFRAN) injection 4 mg, 4 mg, IntraVENous, Q6H PRN, Ed Pino MD    polyethylene glycol (GLYCOLAX) packet 17 g, 17 g, Oral, Daily PRN, Ed Pino MD    acetaminophen (TYLENOL) tablet 650 mg, 650 mg, Oral, Q6H PRN, 650 mg at 02/16/24 0407 **OR** acetaminophen (TYLENOL) suppository 650 mg, 650 mg, Rectal, Q6H PRN, Ed Pino MD    cefTRIAXone (ROCEPHIN) 1,000 mg in sterile water 10 mL IV syringe, 1,000 mg, IntraVENous, Q24H, Ed Pino MD, 1,000 mg at 02/15/24 2300    azithromycin (ZITHROMAX) 500 mg in sodium chloride 0.9 % 250 mL IVPB (Azap6Wvd), 500 mg, IntraVENous, Q24H, Ed Pino MD, Stopped at 02/15/24 2331    ipratropium 0.5 mg-albuterol 2.5 mg (DUONEB) nebulizer solution 1 Dose, 1 Dose, Inhalation, Q6H RT, Ed Pino MD, 1 Dose at 02/16/24 0352    budesonide-formoterol (SYMBICORT) 160-4.5 MCG/ACT inhaler 2 puff, 2 puff, Inhalation, BID RT, Ed Pino MD, 2 puff at 02/15/24 2123    [Held by provider] tiotropium (SPIRIVA RESPIMAT) 2.5 MCG/ACT inhaler 2 puff, 2 puff, Inhalation, Daily RT, Ed Pino MD    Petrolatum-Zinc Oxide ointment, , Topical, TID, Ed Pino MD, Given at 02/15/24 2049                
complains of not receiving his Bumex it was on hold and has now been reordered as he is wearing his noninvasive ventilator at night as he always does  2/18 Bumex resumed good diuresis yesterday renal function continues to improve        Orion Hameed MD

## 2024-02-19 NOTE — DISCHARGE SUMMARY
Discharge Summary       PATIENT ID: Shantanu Casillas  MRN: [id number]   YOB: 1955    DATE OF ADMISSION: 2/12/2024   DATE OF DISCHARGE:   PRIMARY CARE PROVIDER: [unfilled]      ATTENDING PHYSICIAN: Ed Pino  DISCHARGING PROVIDER: Ed Pino      CONSULTATIONS: IP CONSULT TO PULMONOLOGY  IP CONSULT TO NEPHROLOGY  IP CONSULT TO PODIATRY  IP WOUND CARE NURSE CONSULT TO EVAL  IP WOUND CARE NURSE CONSULT TO EVAL    PROCEDURES/SURGERIES: * No surgery found *    ADMITTING DIAGNOSES:    Patient Active Problem List    Diagnosis Date Noted    Acute pneumonia 02/13/2024    CAP (community acquired pneumonia) due to Chlamydia species 02/13/2024    Open wound of right foot 10/11/2023    Atherosclerotic heart disease of native coronary artery without angina pectoris 08/28/2023    Chronic obstructive pulmonary disease, unspecified (HCC) 08/28/2023    Chronic pain disorder 08/28/2023    Dependence on supplemental oxygen 08/28/2023    Difficulty in walking, not elsewhere classified 08/28/2023    Gout, unspecified 08/28/2023    Hypertensive heart disease with heart failure (HCC) 08/28/2023    Morbid (severe) obesity due to excess calories (HCC) 08/28/2023    Muscle weakness (generalized) 08/28/2023    Obstructive sleep apnea (adult) (pediatric) 08/28/2023    Other lack of coordination 08/28/2023    Other symptoms and signs involving the musculoskeletal system 08/28/2023    Personal history of COVID-19 08/28/2023    Presence of cardiac pacemaker 08/28/2023    Type 2 diabetes mellitus with diabetic peripheral angiopathy without gangrene (HCC) 08/28/2023    Unspecified cirrhosis of liver (HCC) 08/28/2023    Unspecified osteoarthritis, unspecified site 08/28/2023    Venous insufficiency (chronic) (peripheral) 08/28/2023    Acute on chronic congestive heart failure (HCC) 06/15/2023    Wound infection 06/15/2023    Venous stasis ulcer (HCC) 06/15/2023    Acute on chronic systolic CHF (congestive heart failure),

## 2024-02-19 NOTE — DISCHARGE SUMMARY
Discharge Summary       PATIENT ID: Shantanu Casillas  MRN: [id number]   YOB: 1955    DATE OF ADMISSION: 2/12/2024   DATE OF DISCHARGE:   PRIMARY CARE PROVIDER: [unfilled]      ATTENDING PHYSICIAN: Ed Pino  DISCHARGING PROVIDER: Ed Pino      CONSULTATIONS: IP CONSULT TO PULMONOLOGY  IP CONSULT TO NEPHROLOGY  IP CONSULT TO PODIATRY  IP WOUND CARE NURSE CONSULT TO EVAL  IP WOUND CARE NURSE CONSULT TO EVAL    PROCEDURES/SURGERIES: * No surgery found *    ADMITTING DIAGNOSES:    Patient Active Problem List    Diagnosis Date Noted    Acute pneumonia 02/13/2024    CAP (community acquired pneumonia) due to Chlamydia species 02/13/2024    Open wound of right foot 10/11/2023    Atherosclerotic heart disease of native coronary artery without angina pectoris 08/28/2023    Chronic obstructive pulmonary disease, unspecified (HCC) 08/28/2023    Chronic pain disorder 08/28/2023    Dependence on supplemental oxygen 08/28/2023    Difficulty in walking, not elsewhere classified 08/28/2023    Gout, unspecified 08/28/2023    Hypertensive heart disease with heart failure (HCC) 08/28/2023    Morbid (severe) obesity due to excess calories (HCC) 08/28/2023    Muscle weakness (generalized) 08/28/2023    Obstructive sleep apnea (adult) (pediatric) 08/28/2023    Other lack of coordination 08/28/2023    Other symptoms and signs involving the musculoskeletal system 08/28/2023    Personal history of COVID-19 08/28/2023    Presence of cardiac pacemaker 08/28/2023    Type 2 diabetes mellitus with diabetic peripheral angiopathy without gangrene (HCC) 08/28/2023    Unspecified cirrhosis of liver (HCC) 08/28/2023    Unspecified osteoarthritis, unspecified site 08/28/2023    Venous insufficiency (chronic) (peripheral) 08/28/2023    Acute on chronic congestive heart failure (HCC) 06/15/2023    Wound infection 06/15/2023    Venous stasis ulcer (HCC) 06/15/2023    Acute on chronic systolic CHF (congestive heart failure),

## 2024-02-19 NOTE — CARE COORDINATION
0830: Chart reviewed.     Per notes; patient followed via nephrology, podiatry, pulmonology and wound care nurse.     Patient is on home oxygen and has a trilogy.    [facility] has accepted patient when medically cleared for discharge.    [facility] accepted patient and will be updated as to DCP.    Updates attached in [facility]Port.    CM will continue to follow patient and recs of medical team.    1200: Discharge summary/order noted and attached in [facility]Port with updates requesting room assignment.    1325: CM met with patient sitting up in chair to confirm understanding of discharge today to:    [facility]   [address] [place] [address]    Room #114     Understanding verbalized.    Report to be called to [phone].    Patient has his home oxygen in the room and will call his spouse for transportation.    Discharge Checklist Completed.    Transition of Care Plan:    RUR: 18%  Prior Level of Functioning: Assistance  Disposition: SNF  If SNF or IPR: Date FOC offered: 02/16/2024  Date FOC received: 02/16/2024  Accepting facility: [facility]  Date authorization started with reference number: N/A  Date authorization received and expires: N/A  Follow up appointments: Discharge Summary  DME needed: None  Transportation at discharge: Spouse  IM/IMM Medicare/ letter given: 02/19/2024  Is patient a [place] and connected with [facility]? No  If yes, was  transfer form completed and VA notified? N/A  Caregiver Contact: Patient  Discharge Caregiver contacted prior to discharge? Patient  Care Conference needed? No  Barriers to discharge: None     bruise/bleed easily. Psychiatric/Behavioral: Negative for behavioral problems, self-injury and sleep disturbance. All other systems reviewed and are negative. Except as noted above the remainder of the review of systems was reviewed and negative. PAST MEDICAL HISTORY     Past Medical History:   Diagnosis Date    Asthma     Chronic kidney disease     Diabetes mellitus ([facility] [address]     Hyperlipidemia     Hypertension          SURGICALHISTORY       Past Surgical History:   Procedure Laterality Date    CHOLECYSTECTOMY      COLONOSCOPY           CURRENT MEDICATIONS       Previous Medications    ALBUTEROL SULFATE  (90 BASE) MCG/ACT INHALER    Inhale 2 puffs into the lungs as needed    BLOOD GLUCOSE MONITOR KIT AND SUPPLIES    1 kit by Other route 4 times daily (before meals and nightly) Pt test 4x daily Dx E11.65. May substitute for generic or insurance covered product    BLOOD GLUCOSE MONITOR STRIPS    1 strip by Other route 4 times daily (before meals and nightly) Pt test 4x daily Dx E11.65. May substitute for generic or insurance covered product    CETIRIZINE (ZYRTEC) 10 MG TABLET    Take 10 mg by mouth daily    CHOLECALCIFEROL (VITAMIN D) 50 MCG (2000 UT) CAPS CAPSULE    Take by mouth    INSULIN LISPRO PROTAMINE & LISPRO (HUMALOG MIX 75/25 KWIKPEN) (75-25) 100 UNIT PER ML SUPN INJECTION PEN    15 units sc bid    INSULIN PEN NEEDLE (NOVOFINE) 32G X 6 MM MISC    1 Device by Does not apply route 2 times daily (before meals) May substitute for generic or insurance covered product    LANCETS MISC    1 each by Does not apply route 4 times daily (before meals and nightly) Pt test 4x daily Dx E11.65.   May substitute for generic or insurance covered product    LISINOPRIL (PRINIVIL;ZESTRIL) 40 MG TABLET    Take 40 mg by mouth daily    MAGNESIUM (MAGNESIUM-OXIDE) 250 MG TABS TABLET    Take 250 mg by mouth 2 times daily     METOPROLOL SUCCINATE 100 MG CS24    Take 100 mg by mouth daily    OMEGA-3 ACID aphasia  Dysarthria (10. ): Normal  Extinction and Inattention (11): No abnormality  Total: 0Glasgow Coma Scale  Eye Opening: Spontaneous  Best Verbal Response: Oriented  Best Motor Response: Obeys commands  Ambrosio Coma Scale Score: 15 @FLOW([id number])@      PHYSICAL EXAM    (up to 7 for level 4, 8 or more for level 5)     ED Triage Vitals [11/09/20 1532]   BP Temp Temp Source Pulse Resp SpO2 Height Weight   (!) 170/105 98.5 °F (36.9 °C) Temporal 97 19 97 % 5' 3\" (1.6 m) 124 lb 9.6 oz (56.5 kg)       Physical Exam  Vitals signs and nursing note reviewed. Constitutional:       General: She is not in acute distress. Appearance: She is well-developed. She is not ill-appearing, toxic-appearing or diaphoretic. HENT:      Head: Normocephalic and atraumatic. Right Ear: Tympanic membrane normal. There is no impacted cerumen. Left Ear: Tympanic membrane normal. There is no impacted cerumen. Nose: Nose normal.      Mouth/Throat:      Mouth: Mucous membranes are moist.      Pharynx: No oropharyngeal exudate or posterior oropharyngeal erythema. Eyes:      General:         Right eye: No discharge. Left eye: No discharge. Extraocular Movements: Extraocular movements intact. Pupils: Pupils are equal, round, and reactive to light. Neck:      Musculoskeletal: Normal range of motion and neck supple. Cardiovascular:      Rate and Rhythm: Normal rate and regular rhythm. Heart sounds: Normal heart sounds. Pulmonary:      Effort: Pulmonary effort is normal. No respiratory distress. Breath sounds: Normal breath sounds. No stridor. Abdominal:      General: Bowel sounds are normal. There is no distension. Palpations: Abdomen is soft. Tenderness: There is no abdominal tenderness. There is no right CVA tenderness or left CVA tenderness. Musculoskeletal: Normal range of motion. Skin:     General: Skin is warm. Findings: No erythema.    Neurological:      Mental Status: She is alert and oriented to person, place, and time. Cranial Nerves: No cranial nerve deficit. Sensory: No sensory deficit. Motor: No weakness. Coordination: Coordination normal.      Gait: Gait normal.   Psychiatric:         Mood and Affect: Mood normal.         Thought Content: Thought content normal.         DIAGNOSTIC RESULTS     EKG: All EKG's are interpreted by the Emergency Department Physician who either signs or Co-signsthis chart in the absence of a cardiologist.         RADIOLOGY:   [place] such as CT, Ultrasound and MRI are read by the radiologist. Michele Jim radiographic images are visualized and preliminarily interpreted by the emergency physician with the below findings:         Interpretation per the Radiologist below, if available at the time ofthis note:    CT Head WO Contrast   Final Result   Impression:      No acute findings. All CT scans at this facility use dose modulation, iterative reconstruction, and/or weight based dosing when appropriate to reduce radiation dose to as low as reasonably achievable. ED BEDSIDE ULTRASOUND:   Performed by ED Physician - none    LABS:  Labs Reviewed   COMPREHENSIVE METABOLIC PANEL - Abnormal; Notable for the following components:       Result Value    Glucose 115 (*)     BUN 28 (*)     CREATININE 1.05 (*)     GFR Non- 52.1 (*)     Calcium 10.2 (*)     All other components within normal limits   CBC WITH AUTO DIFFERENTIAL - Abnormal; Notable for the following components:    RBC 3.84 (*)     MCH 32.5 (*)     All other components within normal limits   POCT GLUCOSE - Normal   MAGNESIUM   URINE RT REFLEX TO CULTURE   TROPONIN   POCT GLUCOSE       All other labs were within normal range or not returned as of this dictation.     EMERGENCY DEPARTMENT COURSE and DIFFERENTIAL DIAGNOSIS/MDM:   Vitals:    Vitals:    11/09/20 1532 11/09/20 1637 11/09/20 1730 11/09/20 1800   BP: (!) 170/105 (!) 161/101 (!) 155/92 (!) 158/89   Pulse: 97 81 78 78   Resp: 19 13 16 14   Temp: 98.5 °F (36.9 °C) 98.1 °F (36.7 °C)     TempSrc: Temporal Oral     SpO2: 97% 98% 98% 98%   Weight: 124 lb 9.6 oz (56.5 kg) 125 lb 6 oz (56.9 kg)     Height: 5' 3\" (1.6 m) 5' 5\" (1.651 m)              MDM  Number of Diagnoses or Management Options  Blurred vision:   Type 2 diabetes mellitus without complication, unspecified whether long term insulin use [facility]):   Diagnosis management comments: oj Morton will disposition to follow-up with primary care and ophthalmology. Patient to return to ER if any symptoms worsen this as well. Amount and/or Complexity of Data Reviewed  Clinical lab tests: reviewed and ordered  Tests in the radiology section of CPT®: reviewed and ordered  Discuss the patient with other providers: yes        CRITICAL CARE TIME       CONSULTS:  None    PROCEDURES:  Unless otherwise noted below, none     Procedures    FINAL IMPRESSION      1. Blurred vision    2.  Type 2 diabetes mellitus without complication, unspecified whether long term insulin use [facility])          DISPOSITION/PLAN   DISPOSITION Decision To Discharge 11/09/2020 06:35:46 PM      PATIENT REFERRED TO:  Atif Stewart DO  [address] [phone]    Call in 1 day      [facility]  [address] [facility] [address]  Call in 1 day      [facility]) ED  [address] [address] [phone]    If symptoms worsen      DISCHARGE MEDICATIONS:  New Prescriptions    No medications on file          (Please note that portions of this note were completed with a voice recognition program.  Efforts were made to edit the dictations but occasionally words are mis-transcribed.)    Go Naylor PA-C (electronically signed)  Attending Emergency Physician       Go Naylor PA-C  11/09/20 [id number]

## 2024-02-22 ENCOUNTER — HOSPITAL ENCOUNTER (OUTPATIENT)
Facility: HOSPITAL | Age: 69
Discharge: HOME | End: 2024-02-22
Attending: SPECIALIST
Payer: MEDICARE

## 2024-02-22 VITALS
DIASTOLIC BLOOD PRESSURE: 88 MMHG | RESPIRATION RATE: 20 BRPM | SYSTOLIC BLOOD PRESSURE: 158 MMHG | HEART RATE: 56 BPM | TEMPERATURE: 97.2 F

## 2024-02-22 DIAGNOSIS — L97.822 NON-PRESSURE CHRONIC ULCER OF OTHER PART OF LEFT LOWER LEG WITH FAT LAYER EXPOSED (HCC): Primary | ICD-10-CM

## 2024-02-22 DIAGNOSIS — S91.301D OPEN WOUND OF RIGHT FOOT, SUBSEQUENT ENCOUNTER: ICD-10-CM

## 2024-02-22 PROCEDURE — 11045 DBRDMT SUBQ TISS EACH ADDL: CPT

## 2024-02-22 PROCEDURE — 11042 DBRDMT SUBQ TIS 1ST 20SQCM/<: CPT

## 2024-02-22 RX ORDER — SODIUM CHLOR/HYPOCHLOROUS ACID 0.033 %
SOLUTION, IRRIGATION IRRIGATION ONCE
OUTPATIENT
Start: 2024-02-22 | End: 2024-02-22

## 2024-02-22 RX ORDER — LIDOCAINE HYDROCHLORIDE 20 MG/ML
JELLY TOPICAL ONCE
OUTPATIENT
Start: 2024-02-22 | End: 2024-02-22

## 2024-02-22 RX ORDER — TRIAMCINOLONE ACETONIDE 1 MG/G
OINTMENT TOPICAL ONCE
OUTPATIENT
Start: 2024-02-22 | End: 2024-02-22

## 2024-02-22 NOTE — DISCHARGE INSTRUCTIONS
Wound Clinic Physician Orders and Discharge Instructions  [facility]  [address], [address]  [place] [address]  Telephone: [phone]     FAX [phone]    NAME:  Shantanu Casillas  YOB: 1955  MEDICAL RECORD NUMBER:  [id number]  DATE:  2/22/2024      Return Appointment:  [] Dressing Supply Provider:   [x] [facility]: [facility]  [x] Facility: [facility]  [x] Return Appointment: 2 Week(s)  [] Nurse Visit:  Week(s)    [] Discharge from [facility]:   [] Healed          [] Refer to Provider:           [] Consult    Follow-up Information:  [] Ordered Tests:   [] Referrals:   [] Rx:   [] Culture:  [] Wound Vac:  [] Skin Sub:   [] OR:  [] Other:     Wound Cleansing:   Do not scrub or use excessive force.  Cleanse wound prior to applying a clean dressing with:  [x] Normal Saline   [] Keep Wound Dry in Shower     [] Wound Cleanser   [] Cleanse wound with Mild Soap & Water    [] Other:       Topical Treatments:  Do not apply lotions, creams, or ointments to wound bed unless directed.   [] Apply moisturizing lotion to skin surrounding the wound prior to dressing change.  [] Apply antifungal ointment to skin surrounding the wound prior to dressing change.  [] Apply thin film of moisture barrier ointment to skin immediately around wound.  [] Apply Betadine to skin immediately around wound   [] Other:      Dressings:           Wound Location Left leg    [] Apply Primary Dressing:       [] MediHoney Gel [] MediHoney Alginate  [x] Calcium Alginate with Silver - 2nd   [] Calcium Alginate without silver   [] Collagen with silver [] Collagen without Silver    [] Santyl with moist saline gauze     [] Hydrofera Blue (cut to size and moistened with normal saline)  [] Hydrofera Blue Ready (cut to size)      [] Normal Saline wet to dry  [] Betadine wet to dry [] Betadine to kaylah wound   [] Hydrogel  [] Mepitel     [] Bactroban/Mupirocin   [] Iodoform Packing Strip [] Plain

## 2024-02-22 NOTE — WOUND CARE
Multilayer Compression Wrap   (Not Unna) Below the Knee    NAME:  Shantanu Casillas  YOB: 1955  MEDICAL RECORD NUMBER:  [id number]  DATE:  2/22/2024    Multilayer compression wrap: Removed old Multilayer wrap if indicated and wash leg with mild soap/water.  Applied moisturizing agent to dry skin as needed.   Applied primary and secondary dressing as ordered.  Applied multilayered dressing below the knee to right lower leg.  Applied multilayered dressing below the knee to left lower leg.  Instructed patient/caregiver not to remove dressing and to keep it clean and dry.   Instructed patient/caregiver on complications to report to provider, such as pain, numbness in toes, heavy drainage, and slippage of dressing.  Instructed patient on purpose of compression dressing and on activity and exercise recommendations.      Electronically signed by Chiara Damon LPN on 2/22/2024 at 2:35 PM   Normal

## 2024-02-22 NOTE — WOUND CARE
Wound Clinic Physician Orders and Discharge Instructions  [facility]  [address], [address]  [place] [address]  Telephone: [phone]     FAX [phone]    NAME:  Shantanu Casillas  YOB: 1955  MEDICAL RECORD NUMBER:  [id number]  DATE:  2/22/2024      Return Appointment:  [] Dressing Supply Provider:   [x] Home Healthcare: [facility]  [x] Facility: [facility]  [x] Return Appointment: 2 Week(s)  [] Nurse Visit:  Week(s)    [] Discharge from [facility]:   [] Healed          [] Refer to Provider:           [] Consult    Follow-up Information:  [] Ordered Tests:   [] Referrals:   [] Rx:   [] Culture:  [] Wound Vac:  [] Skin Sub:   [] OR:  [] Other:     Wound Cleansing:   Do not scrub or use excessive force.  Cleanse wound prior to applying a clean dressing with:  [x] Normal Saline   [] Keep Wound Dry in Shower     [] Wound Cleanser   [] Cleanse wound with Mild Soap & Water    [] Other:       Topical Treatments:  Do not apply lotions, creams, or ointments to wound bed unless directed.   [] Apply moisturizing lotion to skin surrounding the wound prior to dressing change.  [] Apply antifungal ointment to skin surrounding the wound prior to dressing change.  [] Apply thin film of moisture barrier ointment to skin immediately around wound.  [] Apply Betadine to skin immediately around wound   [] Other:      Dressings:           Wound Location Left leg    [] Apply Primary Dressing:       [] MediHoney Gel [] MediHoney Alginate  [x] Calcium Alginate with Silver - 2nd   [] Calcium Alginate without silver   [] Collagen with silver [] Collagen without Silver    [] Santyl with moist saline gauze     [] Hydrofera Blue (cut to size and moistened with normal saline)  [] Hydrofera Blue Ready (cut to size)      [] Normal Saline wet to dry  [] Betadine wet to dry [] Betadine to kaylah wound   [] Hydrogel  [] Mepitel     [] Bactroban/Mupirocin   [] Iodoform Packing Strip [] Plain

## 2024-03-07 ENCOUNTER — HOSPITAL ENCOUNTER (OUTPATIENT)
Facility: HOSPITAL | Age: 69
Discharge: HOME | End: 2024-03-07
Attending: SPECIALIST
Payer: MEDICARE

## 2024-03-07 VITALS
DIASTOLIC BLOOD PRESSURE: 66 MMHG | SYSTOLIC BLOOD PRESSURE: 119 MMHG | TEMPERATURE: 97.5 F | HEART RATE: 98 BPM | RESPIRATION RATE: 20 BRPM

## 2024-03-07 DIAGNOSIS — S91.301D OPEN WOUND OF RIGHT FOOT, SUBSEQUENT ENCOUNTER: ICD-10-CM

## 2024-03-07 DIAGNOSIS — L97.822 NON-PRESSURE CHRONIC ULCER OF OTHER PART OF LEFT LOWER LEG WITH FAT LAYER EXPOSED (HCC): Primary | ICD-10-CM

## 2024-03-07 PROCEDURE — 11042 DBRDMT SUBQ TIS 1ST 20SQCM/<: CPT

## 2024-03-07 PROCEDURE — 11045 DBRDMT SUBQ TISS EACH ADDL: CPT

## 2024-03-07 RX ORDER — LIDOCAINE HYDROCHLORIDE 20 MG/ML
JELLY TOPICAL ONCE
OUTPATIENT
Start: 2024-03-07 | End: 2024-03-07

## 2024-03-07 RX ORDER — TRIAMCINOLONE ACETONIDE 1 MG/G
OINTMENT TOPICAL ONCE
OUTPATIENT
Start: 2024-03-07 | End: 2024-03-07

## 2024-03-07 RX ORDER — SODIUM CHLOR/HYPOCHLOROUS ACID 0.033 %
SOLUTION, IRRIGATION IRRIGATION ONCE
OUTPATIENT
Start: 2024-03-07 | End: 2024-03-07

## 2024-03-07 ASSESSMENT — PAIN DESCRIPTION - ORIENTATION: ORIENTATION: LEFT

## 2024-03-07 ASSESSMENT — PAIN DESCRIPTION - DESCRIPTORS: DESCRIPTORS: ACHING

## 2024-03-07 ASSESSMENT — PAIN DESCRIPTION - LOCATION: LOCATION: LEG;BACK

## 2024-03-07 NOTE — WOUND CARE
Wound Clinic Physician Orders and Discharge Instructions  [facility]  [address], [address]  [place] [address]  Telephone: [phone]     FAX [phone]    NAME:  Shantanu Casillas  YOB: 1955  MEDICAL RECORD NUMBER:  [id number]  DATE:  3/7/2024      Return Appointment:  [] Dressing Supply Provider:   [x] Home Healthcare: [facility]  [x] Facility: [facility]  [x] Return Appointment: 2 Week(s)  [] Nurse Visit:  Week(s)    [] Discharge from [facility]:   [] Healed          [] Refer to Provider:           [] Consult    Follow-up Information:  [] Ordered Tests:   [] Referrals:   [] Rx:   [] Culture:  [] Wound Vac:  [] Skin Sub:   [] OR:  [] Other:     Wound Cleansing:   Do not scrub or use excessive force.  Cleanse wound prior to applying a clean dressing with:  [x] Normal Saline   [] Keep Wound Dry in Shower     [] Wound Cleanser   [] Cleanse wound with Mild Soap & Water    [] Other:       Topical Treatments:  Do not apply lotions, creams, or ointments to wound bed unless directed.   [] Apply moisturizing lotion to skin surrounding the wound prior to dressing change.  [] Apply antifungal ointment to skin surrounding the wound prior to dressing change.  [] Apply thin film of moisture barrier ointment to skin immediately around wound.  [] Apply Betadine to skin immediately around wound   [] Other:      Dressings:           Wound Location Left leg    [x] Apply Primary Dressing:       [] MediHoney Gel [] MediHoney Alginate  [x] Calcium Alginate with Silver - 3rd   [] Calcium Alginate without silver   [x] Collagen with silver- 1st [] Collagen without Silver    [] Santyl with moist saline gauze     [] Hydrofera Blue (cut to size and moistened with normal saline)  [] Hydrofera Blue Ready (cut to size)      [] Normal Saline wet to dry  [] Betadine wet to dry [] Betadine to kaylah wound   [] Hydrogel  [] Mepitel     [] Bactroban/Mupirocin   [] Iodoform Packing Strip []

## 2024-03-07 NOTE — DISCHARGE INSTRUCTIONS
Wound Clinic Physician Orders and Discharge Instructions  [facility]  [address], [address]  [place] [address]  Telephone: [phone]     FAX [phone]    NAME:  Shantanu Casillas  YOB: 1955  MEDICAL RECORD NUMBER:  [id number]  DATE:  3/7/2024      Return Appointment:  [] Dressing Supply Provider:   [x] [facility]: [facility]  [x] Facility: [facility]  [x] Return Appointment: 2 Week(s)  [] Nurse Visit:  Week(s)    [] Discharge from [facility]:   [] Healed          [] Refer to Provider:           [] Consult    Follow-up Information:  [] Ordered Tests:   [] Referrals:   [] Rx:   [] Culture:  [] Wound Vac:  [] Skin Sub:   [] OR:  [] Other:     Wound Cleansing:   Do not scrub or use excessive force.  Cleanse wound prior to applying a clean dressing with:  [x] Normal Saline   [] Keep Wound Dry in Shower     [] Wound Cleanser   [] Cleanse wound with Mild Soap & Water    [] Other:       Topical Treatments:  Do not apply lotions, creams, or ointments to wound bed unless directed.   [] Apply moisturizing lotion to skin surrounding the wound prior to dressing change.  [] Apply antifungal ointment to skin surrounding the wound prior to dressing change.  [] Apply thin film of moisture barrier ointment to skin immediately around wound.  [] Apply Betadine to skin immediately around wound   [] Other:      Dressings:           Wound Location Left leg    [x] Apply Primary Dressing:       [] MediHoney Gel [] MediHoney Alginate  [x] Calcium Alginate with Silver - 3rd   [] Calcium Alginate without silver   [x] Collagen with silver- 1st [] Collagen without Silver    [] Santyl with moist saline gauze     [] Hydrofera Blue (cut to size and moistened with normal saline)  [] Hydrofera Blue Ready (cut to size)      [] Normal Saline wet to dry  [] Betadine wet to dry [] Betadine to kaylah wound   [] Hydrogel  [] Mepitel     [] Bactroban/Mupirocin   [] Iodoform Packing Strip []

## 2024-03-27 ENCOUNTER — HOSPITAL ENCOUNTER (INPATIENT)
Facility: HOSPITAL | Age: 69
LOS: 6 days | Discharge: NURSING, REHAB OR LONG-TERM CARE | DRG: 683 | End: 2024-04-02
Attending: EMERGENCY MEDICINE | Admitting: FAMILY MEDICINE
Payer: MEDICARE

## 2024-03-27 ENCOUNTER — APPOINTMENT (OUTPATIENT)
Facility: HOSPITAL | Age: 69
DRG: 683 | End: 2024-03-27
Payer: MEDICARE

## 2024-03-27 DIAGNOSIS — N17.9 AKI (ACUTE KIDNEY INJURY) (HCC): Primary | ICD-10-CM

## 2024-03-27 DIAGNOSIS — E86.0 DEHYDRATION: ICD-10-CM

## 2024-03-27 LAB
ALBUMIN SERPL-MCNC: 3.5 G/DL (ref 3.5–5)
ALBUMIN/GLOB SERPL: 0.9 (ref 1.1–2.2)
ALP SERPL-CCNC: 78 U/L (ref 45–117)
ALT SERPL-CCNC: 37 U/L (ref 12–78)
ANION GAP SERPL CALC-SCNC: 11 MMOL/L (ref 5–15)
APPEARANCE UR: CLEAR
AST SERPL W P-5'-P-CCNC: 19 U/L (ref 15–37)
BACTERIA URNS QL MICRO: NEGATIVE /HPF
BASOPHILS # BLD: 0 K/UL (ref 0–0.1)
BASOPHILS NFR BLD: 0 % (ref 0–1)
BILIRUB SERPL-MCNC: 0.4 MG/DL (ref 0.2–1)
BILIRUB UR QL: NEGATIVE
BUN SERPL-MCNC: 148 MG/DL (ref 6–20)
BUN/CREAT SERPL: 55 (ref 12–20)
CA-I BLD-MCNC: 9.4 MG/DL (ref 8.5–10.1)
CHLORIDE SERPL-SCNC: 84 MMOL/L (ref 97–108)
CO2 SERPL-SCNC: 34 MMOL/L (ref 21–32)
COLOR UR: ABNORMAL
CREAT SERPL-MCNC: 2.7 MG/DL (ref 0.7–1.3)
DIFFERENTIAL METHOD BLD: ABNORMAL
EOSINOPHIL # BLD: 0 K/UL (ref 0–0.4)
EOSINOPHIL NFR BLD: 0 % (ref 0–7)
EPITH CASTS URNS QL MICRO: ABNORMAL /LPF
ERYTHROCYTE [DISTWIDTH] IN BLOOD BY AUTOMATED COUNT: 14.6 % (ref 11.5–14.5)
EST. AVERAGE GLUCOSE BLD GHB EST-MCNC: 197 MG/DL
GLOBULIN SER CALC-MCNC: 3.8 G/DL (ref 2–4)
GLUCOSE BLD STRIP.AUTO-MCNC: 295 MG/DL (ref 65–100)
GLUCOSE SERPL-MCNC: 323 MG/DL (ref 65–100)
GLUCOSE UR STRIP.AUTO-MCNC: 50 MG/DL
HBA1C MFR BLD: 8.5 % (ref 4–5.6)
HCT VFR BLD AUTO: 36.3 % (ref 36.6–50.3)
HGB BLD-MCNC: 12 G/DL (ref 12.1–17)
HGB UR QL STRIP: NEGATIVE
IMM GRANULOCYTES # BLD AUTO: 0.1 K/UL (ref 0–0.04)
IMM GRANULOCYTES NFR BLD AUTO: 1 % (ref 0–0.5)
KETONES UR QL STRIP.AUTO: NEGATIVE MG/DL
LEUKOCYTE ESTERASE UR QL STRIP.AUTO: ABNORMAL
LYMPHOCYTES # BLD: 0.6 K/UL (ref 0.8–3.5)
LYMPHOCYTES NFR BLD: 6 % (ref 12–49)
MCH RBC QN AUTO: 30.5 PG (ref 26–34)
MCHC RBC AUTO-ENTMCNC: 33.1 G/DL (ref 30–36.5)
MCV RBC AUTO: 92.1 FL (ref 80–99)
MONOCYTES # BLD: 0.4 K/UL (ref 0–1)
MONOCYTES NFR BLD: 4 % (ref 5–13)
NEUTS SEG # BLD: 9 K/UL (ref 1.8–8)
NEUTS SEG NFR BLD: 89 % (ref 32–75)
NITRITE UR QL STRIP.AUTO: NEGATIVE
NRBC # BLD: 0 K/UL (ref 0–0.01)
NRBC BLD-RTO: 0 PER 100 WBC
PERFORMED BY:: ABNORMAL
PH UR STRIP: 7 (ref 5–8)
PLATELET # BLD AUTO: 205 K/UL (ref 150–400)
PMV BLD AUTO: 12.1 FL (ref 8.9–12.9)
POTASSIUM SERPL-SCNC: 3.9 MMOL/L (ref 3.5–5.1)
PROT SERPL-MCNC: 7.3 G/DL (ref 6.4–8.2)
PROT UR STRIP-MCNC: NEGATIVE MG/DL
RBC # BLD AUTO: 3.94 M/UL (ref 4.1–5.7)
RBC #/AREA URNS HPF: ABNORMAL /HPF (ref 0–5)
SODIUM SERPL-SCNC: 129 MMOL/L (ref 136–145)
SP GR UR REFRACTOMETRY: 1.01 (ref 1–1.03)
TROPONIN I SERPL HS-MCNC: 26 NG/L (ref 0–76)
URINE CULTURE IF INDICATED: ABNORMAL
UROBILINOGEN UR QL STRIP.AUTO: 0.1 EU/DL (ref 0.1–1)
WBC # BLD AUTO: 10 K/UL (ref 4.1–11.1)
WBC CASTS URNS QL MICRO: PRESENT
WBC URNS QL MICRO: ABNORMAL /HPF (ref 0–4)

## 2024-03-27 PROCEDURE — 80053 COMPREHEN METABOLIC PANEL: CPT

## 2024-03-27 PROCEDURE — 76770 US EXAM ABDO BACK WALL COMP: CPT

## 2024-03-27 PROCEDURE — 81001 URINALYSIS AUTO W/SCOPE: CPT

## 2024-03-27 PROCEDURE — 6370000000 HC RX 637 (ALT 250 FOR IP): Performed by: EMERGENCY MEDICINE

## 2024-03-27 PROCEDURE — 87086 URINE CULTURE/COLONY COUNT: CPT

## 2024-03-27 PROCEDURE — 84484 ASSAY OF TROPONIN QUANT: CPT

## 2024-03-27 PROCEDURE — 93005 ELECTROCARDIOGRAM TRACING: CPT | Performed by: EMERGENCY MEDICINE

## 2024-03-27 PROCEDURE — 82962 GLUCOSE BLOOD TEST: CPT

## 2024-03-27 PROCEDURE — 96360 HYDRATION IV INFUSION INIT: CPT

## 2024-03-27 PROCEDURE — 6370000000 HC RX 637 (ALT 250 FOR IP): Performed by: FAMILY MEDICINE

## 2024-03-27 PROCEDURE — 6360000002 HC RX W HCPCS: Performed by: FAMILY MEDICINE

## 2024-03-27 PROCEDURE — 2580000003 HC RX 258: Performed by: EMERGENCY MEDICINE

## 2024-03-27 PROCEDURE — 2580000003 HC RX 258: Performed by: FAMILY MEDICINE

## 2024-03-27 PROCEDURE — 83036 HEMOGLOBIN GLYCOSYLATED A1C: CPT

## 2024-03-27 PROCEDURE — 1100000000 HC RM PRIVATE

## 2024-03-27 PROCEDURE — 94640 AIRWAY INHALATION TREATMENT: CPT

## 2024-03-27 PROCEDURE — 99285 EMERGENCY DEPT VISIT HI MDM: CPT

## 2024-03-27 PROCEDURE — 2700000000 HC OXYGEN THERAPY PER DAY

## 2024-03-27 PROCEDURE — 36415 COLL VENOUS BLD VENIPUNCTURE: CPT

## 2024-03-27 PROCEDURE — 85025 COMPLETE CBC W/AUTO DIFF WBC: CPT

## 2024-03-27 RX ORDER — POTASSIUM CHLORIDE 7.45 MG/ML
10 INJECTION INTRAVENOUS PRN
Status: DISCONTINUED | OUTPATIENT
Start: 2024-03-27 | End: 2024-04-02 | Stop reason: HOSPADM

## 2024-03-27 RX ORDER — INSULIN LISPRO 100 [IU]/ML
0-4 INJECTION, SOLUTION INTRAVENOUS; SUBCUTANEOUS NIGHTLY
Status: DISCONTINUED | OUTPATIENT
Start: 2024-03-27 | End: 2024-04-02 | Stop reason: HOSPADM

## 2024-03-27 RX ORDER — ALLOPURINOL 100 MG/1
200 TABLET ORAL DAILY
Status: DISCONTINUED | OUTPATIENT
Start: 2024-03-28 | End: 2024-04-02 | Stop reason: HOSPADM

## 2024-03-27 RX ORDER — SODIUM CHLORIDE 9 MG/ML
INJECTION, SOLUTION INTRAVENOUS CONTINUOUS
Status: DISCONTINUED | OUTPATIENT
Start: 2024-03-27 | End: 2024-03-31

## 2024-03-27 RX ORDER — AMMONIUM LACTATE 12 G/100G
CREAM TOPICAL 3 TIMES DAILY PRN
Status: DISCONTINUED | OUTPATIENT
Start: 2024-03-27 | End: 2024-04-02 | Stop reason: HOSPADM

## 2024-03-27 RX ORDER — 0.9 % SODIUM CHLORIDE 0.9 %
500 INTRAVENOUS SOLUTION INTRAVENOUS ONCE
Status: COMPLETED | OUTPATIENT
Start: 2024-03-27 | End: 2024-03-27

## 2024-03-27 RX ORDER — ASPIRIN 81 MG/1
81 TABLET, CHEWABLE ORAL DAILY
Status: DISCONTINUED | OUTPATIENT
Start: 2024-03-28 | End: 2024-04-02 | Stop reason: HOSPADM

## 2024-03-27 RX ORDER — SODIUM CHLORIDE 0.9 % (FLUSH) 0.9 %
5-40 SYRINGE (ML) INJECTION EVERY 12 HOURS SCHEDULED
Status: DISCONTINUED | OUTPATIENT
Start: 2024-03-27 | End: 2024-04-02 | Stop reason: HOSPADM

## 2024-03-27 RX ORDER — INSULIN GLARGINE 100 [IU]/ML
34 INJECTION, SOLUTION SUBCUTANEOUS NIGHTLY
Status: DISCONTINUED | OUTPATIENT
Start: 2024-03-27 | End: 2024-04-02 | Stop reason: HOSPADM

## 2024-03-27 RX ORDER — COLCHICINE 0.6 MG/1
0.6 TABLET ORAL DAILY
Status: DISCONTINUED | OUTPATIENT
Start: 2024-03-28 | End: 2024-04-02 | Stop reason: HOSPADM

## 2024-03-27 RX ORDER — HEPARIN SODIUM 5000 [USP'U]/ML
5000 INJECTION, SOLUTION INTRAVENOUS; SUBCUTANEOUS EVERY 8 HOURS SCHEDULED
Status: DISCONTINUED | OUTPATIENT
Start: 2024-03-27 | End: 2024-04-02 | Stop reason: HOSPADM

## 2024-03-27 RX ORDER — MAGNESIUM SULFATE IN WATER 40 MG/ML
2000 INJECTION, SOLUTION INTRAVENOUS PRN
Status: DISCONTINUED | OUTPATIENT
Start: 2024-03-27 | End: 2024-04-02 | Stop reason: HOSPADM

## 2024-03-27 RX ORDER — ASCORBIC ACID 500 MG
1000 TABLET ORAL 2 TIMES DAILY
Status: DISCONTINUED | OUTPATIENT
Start: 2024-03-27 | End: 2024-04-02 | Stop reason: HOSPADM

## 2024-03-27 RX ORDER — OXYCODONE HYDROCHLORIDE 5 MG/1
5 TABLET ORAL 2 TIMES DAILY
Status: DISCONTINUED | OUTPATIENT
Start: 2024-03-27 | End: 2024-03-28

## 2024-03-27 RX ORDER — ACETAMINOPHEN 325 MG/1
650 TABLET ORAL EVERY 6 HOURS PRN
Status: DISCONTINUED | OUTPATIENT
Start: 2024-03-27 | End: 2024-04-02 | Stop reason: HOSPADM

## 2024-03-27 RX ORDER — POLYETHYLENE GLYCOL 3350 17 G/17G
17 POWDER, FOR SOLUTION ORAL DAILY PRN
Status: DISCONTINUED | OUTPATIENT
Start: 2024-03-27 | End: 2024-04-02 | Stop reason: HOSPADM

## 2024-03-27 RX ORDER — BUDESONIDE AND FORMOTEROL FUMARATE DIHYDRATE 160; 4.5 UG/1; UG/1
2 AEROSOL RESPIRATORY (INHALATION)
Status: DISCONTINUED | OUTPATIENT
Start: 2024-03-27 | End: 2024-04-02 | Stop reason: HOSPADM

## 2024-03-27 RX ORDER — SODIUM CHLORIDE 0.9 % (FLUSH) 0.9 %
5-40 SYRINGE (ML) INJECTION PRN
Status: DISCONTINUED | OUTPATIENT
Start: 2024-03-27 | End: 2024-04-02 | Stop reason: HOSPADM

## 2024-03-27 RX ORDER — SODIUM CHLORIDE 9 MG/ML
INJECTION, SOLUTION INTRAVENOUS PRN
Status: DISCONTINUED | OUTPATIENT
Start: 2024-03-27 | End: 2024-04-02 | Stop reason: HOSPADM

## 2024-03-27 RX ORDER — ONDANSETRON 2 MG/ML
4 INJECTION INTRAMUSCULAR; INTRAVENOUS EVERY 6 HOURS PRN
Status: DISCONTINUED | OUTPATIENT
Start: 2024-03-27 | End: 2024-04-02 | Stop reason: HOSPADM

## 2024-03-27 RX ORDER — ONDANSETRON 4 MG/1
4 TABLET, ORALLY DISINTEGRATING ORAL EVERY 8 HOURS PRN
Status: DISCONTINUED | OUTPATIENT
Start: 2024-03-27 | End: 2024-04-02 | Stop reason: HOSPADM

## 2024-03-27 RX ORDER — DEXTROSE MONOHYDRATE 100 MG/ML
INJECTION, SOLUTION INTRAVENOUS CONTINUOUS PRN
Status: DISCONTINUED | OUTPATIENT
Start: 2024-03-27 | End: 2024-04-02 | Stop reason: HOSPADM

## 2024-03-27 RX ORDER — GLUCAGON 1 MG/ML
1 KIT INJECTION PRN
Status: DISCONTINUED | OUTPATIENT
Start: 2024-03-27 | End: 2024-04-02 | Stop reason: HOSPADM

## 2024-03-27 RX ORDER — VITAMIN B COMPLEX
1 CAPSULE ORAL DAILY
Status: DISCONTINUED | OUTPATIENT
Start: 2024-03-27 | End: 2024-04-02 | Stop reason: HOSPADM

## 2024-03-27 RX ORDER — FLUTICASONE PROPIONATE 50 MCG
1 SPRAY, SUSPENSION (ML) NASAL 2 TIMES DAILY
Status: DISCONTINUED | OUTPATIENT
Start: 2024-03-27 | End: 2024-04-02 | Stop reason: HOSPADM

## 2024-03-27 RX ORDER — POTASSIUM CHLORIDE 20 MEQ/1
40 TABLET, EXTENDED RELEASE ORAL PRN
Status: DISCONTINUED | OUTPATIENT
Start: 2024-03-27 | End: 2024-04-02 | Stop reason: HOSPADM

## 2024-03-27 RX ORDER — ACETAMINOPHEN 650 MG/1
650 SUPPOSITORY RECTAL EVERY 6 HOURS PRN
Status: DISCONTINUED | OUTPATIENT
Start: 2024-03-27 | End: 2024-04-02 | Stop reason: HOSPADM

## 2024-03-27 RX ORDER — ATORVASTATIN CALCIUM 40 MG/1
40 TABLET, FILM COATED ORAL NIGHTLY
Status: DISCONTINUED | OUTPATIENT
Start: 2024-03-27 | End: 2024-04-02 | Stop reason: HOSPADM

## 2024-03-27 RX ORDER — GABAPENTIN 400 MG/1
400 CAPSULE ORAL DAILY
Status: DISCONTINUED | OUTPATIENT
Start: 2024-03-28 | End: 2024-03-28

## 2024-03-27 RX ORDER — INSULIN LISPRO 100 [IU]/ML
0-16 INJECTION, SOLUTION INTRAVENOUS; SUBCUTANEOUS
Status: DISCONTINUED | OUTPATIENT
Start: 2024-03-27 | End: 2024-04-02 | Stop reason: HOSPADM

## 2024-03-27 RX ORDER — ALBUTEROL SULFATE 90 UG/1
1 AEROSOL, METERED RESPIRATORY (INHALATION) EVERY 4 HOURS PRN
Status: DISCONTINUED | OUTPATIENT
Start: 2024-03-27 | End: 2024-04-02 | Stop reason: HOSPADM

## 2024-03-27 RX ORDER — IPRATROPIUM BROMIDE AND ALBUTEROL SULFATE 2.5; .5 MG/3ML; MG/3ML
1 SOLUTION RESPIRATORY (INHALATION)
Status: DISCONTINUED | OUTPATIENT
Start: 2024-03-27 | End: 2024-03-28

## 2024-03-27 RX ORDER — VITAMIN E 268 MG
400 CAPSULE ORAL 2 TIMES DAILY
Status: DISCONTINUED | OUTPATIENT
Start: 2024-03-27 | End: 2024-04-02 | Stop reason: HOSPADM

## 2024-03-27 RX ADMIN — OXYCODONE 5 MG: 5 TABLET ORAL at 20:20

## 2024-03-27 RX ADMIN — SODIUM CHLORIDE, PRESERVATIVE FREE 10 ML: 5 INJECTION INTRAVENOUS at 20:23

## 2024-03-27 RX ADMIN — IPRATROPIUM BROMIDE AND ALBUTEROL SULFATE 1 DOSE: 2.5; .5 SOLUTION RESPIRATORY (INHALATION) at 20:00

## 2024-03-27 RX ADMIN — SODIUM CHLORIDE: 9 INJECTION, SOLUTION INTRAVENOUS at 20:20

## 2024-03-27 RX ADMIN — SODIUM CHLORIDE 500 ML: 9 INJECTION, SOLUTION INTRAVENOUS at 16:45

## 2024-03-27 RX ADMIN — Medication 15 ML: at 17:45

## 2024-03-27 RX ADMIN — INSULIN GLARGINE 34 UNITS: 100 INJECTION, SOLUTION SUBCUTANEOUS at 22:17

## 2024-03-27 RX ADMIN — OXYCODONE HYDROCHLORIDE AND ACETAMINOPHEN 1000 MG: 500 TABLET ORAL at 20:20

## 2024-03-27 RX ADMIN — HEPARIN SODIUM 5000 UNITS: 5000 INJECTION INTRAVENOUS; SUBCUTANEOUS at 21:58

## 2024-03-27 RX ADMIN — ATORVASTATIN CALCIUM 40 MG: 40 TABLET, FILM COATED ORAL at 20:20

## 2024-03-27 ASSESSMENT — PAIN SCALES - GENERAL
PAINLEVEL_OUTOF10: 10
PAINLEVEL_OUTOF10: 0

## 2024-03-27 ASSESSMENT — PAIN DESCRIPTION - LOCATION: LOCATION: BACK;LEG;NECK

## 2024-03-27 NOTE — H&P
History and Physical    NAME:   Shantanu Casillas   :  1955   MRN:  [id number]     Date/Time: 3/27/2024 6:58 PM    Patient PCP: Ed Pino MD  ______________________________________________________________________       Subjective:     CHIEF COMPLAINT:     Abnormal labs        HISTORY OF PRESENT ILLNESS:     General Daily Progress Note  Patient is a 68 y.o. year old male past medical history of COPD type 2 diabetes hypertension gout COPD CHF ejection fraction 4045% venous stasis ulcers chronic kidney disease obstructive sleep apnea who recently discharged from the hospital more than a month ago with community-acquired pneumonia at the nursing facility blood work done shows today acute kidney injury seen by the ER physician recommended patient to be admitted for further evaluation treatment patient have ultrasound of the kidneys done which is pending    Past Medical History:   Diagnosis Date    Arthritis     CAD (coronary artery disease)     Chronic obstructive pulmonary disease (HCC)     Diabetes (HCC)     Gout     Hypertension     Ill-defined condition     Sleep apnea     cpap        Past Surgical History:   Procedure Laterality Date    FOOT DEBRIDEMENT Right 12/15/2023    INCISION AND DRAINAGE RIGHT FIRST METATARSAL PHALANGEAL JOINT performed by Polo Oviedo DPM at [facility] MAIN OR    FOOT SURGERY      right heel - foriegn object    HX VEIN ABLATION ADHESIVE      ORTHOPEDIC SURGERY      back surgery    PACEMAKER      aicd -       Social History     Tobacco Use    Smoking status: Never    Smokeless tobacco: Never   Substance Use Topics    Alcohol use: Not Currently        Family History   Problem Relation Age of Onset    Diabetes Mother     Heart Disease Father     Hypertension Father     Diabetes Sister     Diabetes Brother     Hypertension Mother     Heart Disease Mother     Kidney Disease Mother        Allergies   Allergen Reactions    Amitriptyline Angioedema    Naproxen      Other reaction(s):

## 2024-03-27 NOTE — ED TRIAGE NOTES
Pt arrived via ems from [facility] re[facility], for abnormal labs, per ems they said a possible acute kidney injury, pt states he is just feeling generalized weakness.     Alert and oriented on arrival     3l o2 at baseline

## 2024-03-27 NOTE — ED PROVIDER NOTES
[facility] EMERGENCY DEPT  EMERGENCY DEPARTMENT HISTORY AND PHYSICAL EXAM      Date: 3/27/2024  Patient Name: Shantanu Casillas  MRN: [id number]  Birthdate 1955  Date of evaluation: 3/27/2024  Provider: Rosi Fernandez MD   Note Started: 4:22 PM EDT 3/27/24    HISTORY OF PRESENT ILLNESS     Chief Complaint   Patient presents with    Abnormal Lab       History Provided By: Patient    HPI: Shantanu Casillas is a 68 y.o. male past medical history of coronary artery disease, COPD, diabetes, gout, hypertension and sleep apnea presents for evaluation after having labs performed to start a new medication.  The facility told him today that his kidney function was poor and he needed to be evaluated in the hospital.  Patient notes he is making his normal amount of urine.  He is feeling generally weak which started yesterday.  He is otherwise been in his normal state of health.  No recent illness.  No fevers.    PAST MEDICAL HISTORY   Past Medical History:  Past Medical History:   Diagnosis Date    Arthritis     CAD (coronary artery disease)     Chronic obstructive pulmonary disease (HCC)     Diabetes (HCC)     Gout     Hypertension     Ill-defined condition     Sleep apnea     cpap       Past Surgical History:  Past Surgical History:   Procedure Laterality Date    FOOT DEBRIDEMENT Right 12/15/2023    INCISION AND DRAINAGE RIGHT FIRST METATARSAL PHALANGEAL JOINT performed by Polo Oviedo DPM at [facility] MAIN OR    FOOT SURGERY      right heel - foriegn object    HX VEIN ABLATION ADHESIVE      ORTHOPEDIC SURGERY      back surgery    PACEMAKER      aicd -       Family History:  Family History   Problem Relation Age of Onset    Diabetes Mother     Heart Disease Father     Hypertension Father     Diabetes Sister     Diabetes Brother     Hypertension Mother     Heart Disease Mother     Kidney Disease Mother        Social History:  Social History     Tobacco Use    Smoking status: Never    Smokeless tobacco: Never   Substance Use Topics

## 2024-03-28 LAB
ALBUMIN SERPL-MCNC: 3.2 G/DL (ref 3.5–5)
ALBUMIN/GLOB SERPL: 0.8 (ref 1.1–2.2)
ALP SERPL-CCNC: 67 U/L (ref 45–117)
ALT SERPL-CCNC: 35 U/L (ref 12–78)
ANION GAP SERPL CALC-SCNC: 9 MMOL/L (ref 5–15)
AST SERPL W P-5'-P-CCNC: 20 U/L (ref 15–37)
BACTERIA SPEC CULT: NORMAL
BILIRUB SERPL-MCNC: 0.5 MG/DL (ref 0.2–1)
BUN SERPL-MCNC: 133 MG/DL (ref 6–20)
BUN/CREAT SERPL: 62 (ref 12–20)
CA-I BLD-MCNC: 9.4 MG/DL (ref 8.5–10.1)
CHLORIDE SERPL-SCNC: 90 MMOL/L (ref 97–108)
CO2 SERPL-SCNC: 36 MMOL/L (ref 21–32)
COLONY COUNT, CNT: NORMAL
COLONY COUNT, CNT: NORMAL
CREAT SERPL-MCNC: 2.13 MG/DL (ref 0.7–1.3)
EKG ATRIAL RATE: 91 BPM
EKG DIAGNOSIS: NORMAL
EKG P AXIS: 15 DEGREES
EKG P-R INTERVAL: 180 MS
EKG Q-T INTERVAL: 390 MS
EKG QRS DURATION: 122 MS
EKG QTC CALCULATION (BAZETT): 479 MS
EKG R AXIS: 120 DEGREES
EKG T AXIS: -26 DEGREES
EKG VENTRICULAR RATE: 91 BPM
GLOBULIN SER CALC-MCNC: 4.2 G/DL (ref 2–4)
GLUCOSE BLD STRIP.AUTO-MCNC: 148 MG/DL (ref 65–100)
GLUCOSE BLD STRIP.AUTO-MCNC: 155 MG/DL (ref 65–100)
GLUCOSE BLD STRIP.AUTO-MCNC: 225 MG/DL (ref 65–100)
GLUCOSE SERPL-MCNC: 117 MG/DL (ref 65–100)
Lab: NORMAL
PERFORMED BY:: ABNORMAL
POTASSIUM SERPL-SCNC: 3.3 MMOL/L (ref 3.5–5.1)
PROT SERPL-MCNC: 7.4 G/DL (ref 6.4–8.2)
SODIUM SERPL-SCNC: 135 MMOL/L (ref 136–145)

## 2024-03-28 PROCEDURE — 6360000002 HC RX W HCPCS: Performed by: FAMILY MEDICINE

## 2024-03-28 PROCEDURE — 36415 COLL VENOUS BLD VENIPUNCTURE: CPT

## 2024-03-28 PROCEDURE — 2580000003 HC RX 258: Performed by: INTERNAL MEDICINE

## 2024-03-28 PROCEDURE — 1100000000 HC RM PRIVATE

## 2024-03-28 PROCEDURE — 6370000000 HC RX 637 (ALT 250 FOR IP): Performed by: INTERNAL MEDICINE

## 2024-03-28 PROCEDURE — 6370000000 HC RX 637 (ALT 250 FOR IP): Performed by: FAMILY MEDICINE

## 2024-03-28 PROCEDURE — 80053 COMPREHEN METABOLIC PANEL: CPT

## 2024-03-28 PROCEDURE — 94640 AIRWAY INHALATION TREATMENT: CPT

## 2024-03-28 PROCEDURE — 82962 GLUCOSE BLOOD TEST: CPT

## 2024-03-28 PROCEDURE — 94761 N-INVAS EAR/PLS OXIMETRY MLT: CPT

## 2024-03-28 PROCEDURE — 2700000000 HC OXYGEN THERAPY PER DAY

## 2024-03-28 PROCEDURE — 2580000003 HC RX 258: Performed by: FAMILY MEDICINE

## 2024-03-28 RX ORDER — OXYCODONE HYDROCHLORIDE 10 MG/1
10 TABLET ORAL 2 TIMES DAILY
Status: DISCONTINUED | OUTPATIENT
Start: 2024-03-28 | End: 2024-03-28

## 2024-03-28 RX ORDER — OXYCODONE HYDROCHLORIDE 5 MG/1
10 TABLET ORAL 4 TIMES DAILY
COMMUNITY

## 2024-03-28 RX ORDER — METHOCARBAMOL 750 MG/1
750 TABLET, FILM COATED ORAL EVERY 6 HOURS PRN
Status: ON HOLD | COMMUNITY
End: 2024-04-02 | Stop reason: HOSPADM

## 2024-03-28 RX ORDER — OXYCODONE HYDROCHLORIDE 10 MG/1
10 TABLET ORAL 4 TIMES DAILY
Status: DISCONTINUED | OUTPATIENT
Start: 2024-03-28 | End: 2024-04-02 | Stop reason: HOSPADM

## 2024-03-28 RX ORDER — GABAPENTIN 400 MG/1
400 CAPSULE ORAL 4 TIMES DAILY
Status: DISCONTINUED | OUTPATIENT
Start: 2024-03-28 | End: 2024-03-28

## 2024-03-28 RX ORDER — POTASSIUM CHLORIDE 20 MEQ/1
40 TABLET, EXTENDED RELEASE ORAL ONCE
Status: COMPLETED | OUTPATIENT
Start: 2024-03-28 | End: 2024-03-28

## 2024-03-28 RX ORDER — PREDNISONE 10 MG/1
20 TABLET ORAL DAILY
Status: ON HOLD | COMMUNITY
End: 2024-04-02 | Stop reason: HOSPADM

## 2024-03-28 RX ORDER — GABAPENTIN 400 MG/1
400 CAPSULE ORAL DAILY
Status: DISCONTINUED | OUTPATIENT
Start: 2024-03-29 | End: 2024-03-31

## 2024-03-28 RX ORDER — OXYCODONE HYDROCHLORIDE 10 MG/1
10 TABLET ORAL 4 TIMES DAILY
Status: DISCONTINUED | OUTPATIENT
Start: 2024-03-28 | End: 2024-03-28

## 2024-03-28 RX ORDER — PANTOPRAZOLE SODIUM 40 MG/1
40 TABLET, DELAYED RELEASE ORAL
Status: DISCONTINUED | OUTPATIENT
Start: 2024-03-28 | End: 2024-04-02 | Stop reason: HOSPADM

## 2024-03-28 RX ADMIN — GABAPENTIN 400 MG: 400 CAPSULE ORAL at 12:37

## 2024-03-28 RX ADMIN — SODIUM CHLORIDE: 9 INJECTION, SOLUTION INTRAVENOUS at 22:46

## 2024-03-28 RX ADMIN — FLUTICASONE PROPIONATE 1 SPRAY: 50 SPRAY, METERED NASAL at 20:54

## 2024-03-28 RX ADMIN — HEPARIN SODIUM 5000 UNITS: 5000 INJECTION INTRAVENOUS; SUBCUTANEOUS at 15:37

## 2024-03-28 RX ADMIN — SODIUM CHLORIDE, PRESERVATIVE FREE 10 ML: 5 INJECTION INTRAVENOUS at 20:43

## 2024-03-28 RX ADMIN — SODIUM CHLORIDE: 9 INJECTION, SOLUTION INTRAVENOUS at 10:57

## 2024-03-28 RX ADMIN — BUDESONIDE AND FORMOTEROL FUMARATE DIHYDRATE 2 PUFF: 160; 4.5 AEROSOL RESPIRATORY (INHALATION) at 08:42

## 2024-03-28 RX ADMIN — HEPARIN SODIUM 5000 UNITS: 5000 INJECTION INTRAVENOUS; SUBCUTANEOUS at 05:45

## 2024-03-28 RX ADMIN — ATORVASTATIN CALCIUM 40 MG: 40 TABLET, FILM COATED ORAL at 20:43

## 2024-03-28 RX ADMIN — OXYCODONE HYDROCHLORIDE 10 MG: 10 TABLET ORAL at 12:37

## 2024-03-28 RX ADMIN — OXYCODONE HYDROCHLORIDE AND ACETAMINOPHEN 1000 MG: 500 TABLET ORAL at 10:51

## 2024-03-28 RX ADMIN — INSULIN LISPRO 4 UNITS: 100 INJECTION, SOLUTION INTRAVENOUS; SUBCUTANEOUS at 18:07

## 2024-03-28 RX ADMIN — INSULIN GLARGINE 34 UNITS: 100 INJECTION, SOLUTION SUBCUTANEOUS at 20:43

## 2024-03-28 RX ADMIN — Medication 400 UNITS: at 10:56

## 2024-03-28 RX ADMIN — ALLOPURINOL 200 MG: 100 TABLET ORAL at 10:53

## 2024-03-28 RX ADMIN — Medication 400 UNITS: at 20:43

## 2024-03-28 RX ADMIN — COLCHICINE 0.6 MG: 0.6 TABLET ORAL at 10:53

## 2024-03-28 RX ADMIN — HEPARIN SODIUM 5000 UNITS: 5000 INJECTION INTRAVENOUS; SUBCUTANEOUS at 20:43

## 2024-03-28 RX ADMIN — POTASSIUM CHLORIDE 40 MEQ: 1500 TABLET, EXTENDED RELEASE ORAL at 18:06

## 2024-03-28 RX ADMIN — FLUTICASONE PROPIONATE 1 SPRAY: 50 SPRAY, METERED NASAL at 12:40

## 2024-03-28 RX ADMIN — BUDESONIDE AND FORMOTEROL FUMARATE DIHYDRATE 2 PUFF: 160; 4.5 AEROSOL RESPIRATORY (INHALATION) at 19:47

## 2024-03-28 RX ADMIN — OXYCODONE HYDROCHLORIDE 10 MG: 10 TABLET ORAL at 20:43

## 2024-03-28 RX ADMIN — OXYCODONE HYDROCHLORIDE AND ACETAMINOPHEN 1000 MG: 500 TABLET ORAL at 20:43

## 2024-03-28 RX ADMIN — ASPIRIN 81 MG 81 MG: 81 TABLET ORAL at 10:51

## 2024-03-28 RX ADMIN — PANTOPRAZOLE SODIUM 40 MG: 40 TABLET, DELAYED RELEASE ORAL at 15:37

## 2024-03-28 RX ADMIN — TIOTROPIUM BROMIDE INHALATION SPRAY 2 PUFF: 3.12 SPRAY, METERED RESPIRATORY (INHALATION) at 08:42

## 2024-03-28 RX ADMIN — OXYCODONE HYDROCHLORIDE 10 MG: 10 TABLET ORAL at 18:06

## 2024-03-28 ASSESSMENT — PAIN SCALES - GENERAL
PAINLEVEL_OUTOF10: 8
PAINLEVEL_OUTOF10: 8
PAINLEVEL_OUTOF10: 10
PAINLEVEL_OUTOF10: 8
PAINLEVEL_OUTOF10: 10
PAINLEVEL_OUTOF10: 7
PAINLEVEL_OUTOF10: 9

## 2024-03-28 ASSESSMENT — PAIN DESCRIPTION - LOCATION: LOCATION: LEG

## 2024-03-28 ASSESSMENT — PAIN DESCRIPTION - DESCRIPTORS
DESCRIPTORS: ACHING;THROBBING;BURNING
DESCRIPTORS: ACHING
DESCRIPTORS: ACHING;THROBBING

## 2024-03-28 ASSESSMENT — PAIN DESCRIPTION - ORIENTATION
ORIENTATION: LEFT;RIGHT;UPPER;LOWER
ORIENTATION: RIGHT;LEFT;LOWER;POSTERIOR
ORIENTATION: RIGHT;LEFT

## 2024-03-28 NOTE — PROGRESS NOTES
History and Physical    NAME:   Shantanu Casillas   :  1955   MRN:  [id number]     Date/Time: 3/28/2024 12:46 PM    Patient PCP: Ed Pino MD  ______________________________________________________________________       Subjective:     CHIEF COMPLAINT:     Abnormal labs        HISTORY OF PRESENT ILLNESS:     General Daily Progress Note  Patient is a 68 y.o. year old male past medical history of COPD type 2 diabetes hypertension gout COPD CHF ejection fraction 4045% venous stasis ulcers chronic kidney disease obstructive sleep apnea who recently discharged from the hospital more than a month ago with community-acquired pneumonia at the nursing facility blood work done shows today acute kidney injury seen by the ER physician recommended patient to be admitted for further evaluation treatment patient have ultrasound of the kidneys done which is pending    3/28  Patient alert awake denies any chest pain or shortness of breath or nausea no vomiting feeling better    Past Medical History:   Diagnosis Date    Arthritis     CAD (coronary artery disease)     Chronic obstructive pulmonary disease (HCC)     Diabetes (HCC)     Gout     Hypertension     Ill-defined condition     Sleep apnea     cpap        Past Surgical History:   Procedure Laterality Date    FOOT DEBRIDEMENT Right 12/15/2023    INCISION AND DRAINAGE RIGHT FIRST METATARSAL PHALANGEAL JOINT performed by Polo Oviedo DPM at [facility] MAIN OR    FOOT SURGERY      right heel - foriegn object    HX VEIN ABLATION ADHESIVE      ORTHOPEDIC SURGERY      back surgery    PACEMAKER      aicd -       Social History     Tobacco Use    Smoking status: Never    Smokeless tobacco: Never   Substance Use Topics    Alcohol use: Not Currently        Family History   Problem Relation Age of Onset    Diabetes Mother     Heart Disease Father     Hypertension Father     Diabetes Sister     Diabetes Brother     Hypertension Mother     Heart Disease Mother     Kidney Disease  times per day, Ed Pino MD, 10 mL at 03/27/24 2023    sodium chloride flush 0.9 % injection 5-40 mL, 5-40 mL, IntraVENous, PRN, Ed Pino MD    0.9 % sodium chloride infusion, , IntraVENous, PRN, Ed Pino MD    potassium chloride (KLOR-CON M) extended release tablet 40 mEq, 40 mEq, Oral, PRN **OR** potassium bicarb-citric acid (EFFER-K) effervescent tablet 40 mEq, 40 mEq, Oral, PRN **OR** potassium chloride 10 mEq/100 mL IVPB (Peripheral Line), 10 mEq, IntraVENous, PRN, Ed Pino MD    magnesium sulfate 2000 mg in 50 mL IVPB premix, 2,000 mg, IntraVENous, PRN, Ed Pino MD    ondansetron (ZOFRAN-ODT) disintegrating tablet 4 mg, 4 mg, Oral, Q8H PRN **OR** ondansetron (ZOFRAN) injection 4 mg, 4 mg, IntraVENous, Q6H PRN, Ed Pino MD    polyethylene glycol (GLYCOLAX) packet 17 g, 17 g, Oral, Daily PRN, Ed Pino MD    acetaminophen (TYLENOL) tablet 650 mg, 650 mg, Oral, Q6H PRN **OR** acetaminophen (TYLENOL) suppository 650 mg, 650 mg, Rectal, Q6H PRN, Ed Pino MD    heparin (porcine) injection 5,000 Units, 5,000 Units, SubCUTAneous, 3 times per day, Ed Pino MD, 5,000 Units at 03/28/24 0545    tiotropium (SPIRIVA RESPIMAT) 2.5 MCG/ACT inhaler 2 puff, 2 puff, Inhalation, Daily, Ed Pino MD, 2 puff at 03/28/24 0842    budesonide-formoterol (SYMBICORT) 160-4.5 MCG/ACT inhaler 2 puff, 2 puff, Inhalation, BID RT, Ed Pino MD, 2 puff at 03/28/24 0842 ______________________________________________    Signed: Ed Pino MD

## 2024-03-28 NOTE — PROGRESS NOTES
Renal Dosing/Monitoring  Medication: Gabapentin    Current regimen:  400mg QID  Recent Labs     03/27/24  1643   CREATININE 2.70*   *     Estimated CrCl:  41 mL/min (DEMARCO)    Plan: Change to Gabapentin 400mg once daily starting 3/28/24 per Dr. Hayden per [facility] P&T Committee Protocol with respect to renal function.

## 2024-03-28 NOTE — PROGRESS NOTES
Pt arrived to floor without an IV. Vitals obtained assessment complete     2158  Pt given med without difficulty     2217  Pt given med without difficulty    2240  Called [facility] to get pt's cpap settings. Spoke with Terri she stated that there isn't anyone in the cpap department over night and to call [phone]. Spoke with pt to see if the assisted living facility would have the settings he stated \" no they don't know the settings.I'll be alright till tomorrow.\"     0025  22 g inserted to right hand IV fluids started     0327  Pt awake sitting on side of bed given ice water voiced no other needs at this time     0820  Report given to on coming nurse Kari including SBAR opportunity given to ask questions

## 2024-03-28 NOTE — ED NOTES
ED TO INPATIENT SBAR HANDOFF    Patient Name: Shantanu Casillas   Preferred Name: Shantanu  : 1955  68 y.o.   Family/Caregiver Present: no   Code Status Order: Full Code  PO Status: NPO:No  Telemetry Order: Remote   C-SSRS: Risk of Suicide: No Risk  Sitter no     Restraints:     Sepsis Risk Score Sepsis Risk Score: 4.72    Situation  Chief Complaint   Patient presents with    Abnormal Lab     Brief Description of Patient's Condition: Pt arrived from [facility] for abnormal labs, per EMS possible acute kidney injury. Pt states he is feeling generalized weakness.   Mental Status: oriented and alert  Arrived from:Rehabilitation Facility  Imaging:   US RETROPERITONEAL COMPLETE   Final Result   Echogenic kidneys may be secondary to medical renal disease.   Possible stone in the left kidney. There is no hydronephrosis.                             Abnormal labs:   Abnormal Labs Reviewed   CBC WITH AUTO DIFFERENTIAL - Abnormal; Notable for the following components:       Result Value    RBC 3.94 (*)     Hemoglobin 12.0 (*)     Hematocrit 36.3 (*)     RDW 14.6 (*)     Neutrophils % 89 (*)     Lymphocytes % 6 (*)     Monocytes % 4 (*)     Immature Granulocytes 1 (*)     Neutrophils Absolute 9.0 (*)     Lymphocytes Absolute 0.6 (*)     Absolute Immature Granulocyte 0.1 (*)     All other components within normal limits   COMPREHENSIVE METABOLIC PANEL - Abnormal; Notable for the following components:    Sodium 129 (*)     Chloride 84 (*)     CO2 34 (*)     Glucose 323 (*)      (*)     Creatinine 2.70 (*)     Bun/Cre Ratio 55 (*)     Est, Glom Filt Rate 25 (*)     Albumin/Globulin Ratio 0.9 (*)     All other components within normal limits   URINALYSIS WITH REFLEX TO CULTURE - Abnormal; Notable for the following components:    Glucose, UA 50 (*)     Leukocyte Esterase, Urine Moderate (*)     Urine Culture if Indicated Urine Culture Ordered (*)     Budding Yeast Present (*)     All other    potassium chloride 10 mEq/100 mL IVPB (Peripheral Line) (has no administration in time range)   magnesium sulfate 2000 mg in 50 mL IVPB premix (has no administration in time range)   ondansetron (ZOFRAN-ODT) disintegrating tablet 4 mg (has no administration in time range)     Or   ondansetron (ZOFRAN) injection 4 mg (has no administration in time range)   polyethylene glycol (GLYCOLAX) packet 17 g (has no administration in time range)   acetaminophen (TYLENOL) tablet 650 mg (has no administration in time range)     Or   acetaminophen (TYLENOL) suppository 650 mg (has no administration in time range)   heparin (porcine) injection 5,000 Units (has no administration in time range)   sodium chloride 0.9 % bolus 500 mL (0 mLs IntraVENous Stopped 3/27/24 1716)   gi cocktail 15 mL (15 mLs Oral Given 3/27/24 1745)     Last documented pain medication administration: Oxycodone 5mg PO @2020  Pertinent or High Risk Medications/Drips: yes  If Yes, please provide details: NS @ 75ml/hr  Blood Product Administration: no  If Yes, please provide details:   Process Protocols/Bundles: N/A    Recommendation  Incomplete STAT orders: n/a  Overdue Medications: see MAR   Patient Belongings:    Additional Comments: Pt has been standing on the side of the bed to use urinal with staff, just needs assistance holding urinal.   If any further questions, please call Sending RN at [phone]      Admitting Unit Notification  Name of person notified and time: 2050 MACIE Zhang      Electronically signed by: Electronically signed by Diane Ascencio RN on 3/27/2024 at 8:44 PM

## 2024-03-28 NOTE — CARE COORDINATION
03/28/24 1141   Service Assessment   Patient Orientation Alert and Oriented   Cognition Alert   History Provided By Patient   Primary Caregiver Self   Accompanied By/Relationship alone, but wife was on phone   Support Systems Spouse/Significant Other   Patient's Healthcare Decision Maker is: Named in Scanned ACP Document   PCP Verified by CM Yes  (Dr. Pino last seen within 3 months)   Last Visit to PCP Within last 3 months   Prior Functional Level Assistance with the following:;Mobility;Bathing;Dressing   Current Functional Level Assistance with the following:;Mobility;Bathing;Dressing   Can patient return to prior living arrangement Yes  (Admitted from [facility])   Ability to make needs known: Good   Family able to assist with home care needs: Yes   Would you like for me to discuss the discharge plan with any other family members/significant others, and if so, who? Yes  (wife)   Financial Resources Medicare   Community Resources None   CM/SW Referral Other (see comment)  (discharge planning)   Social/Functional History   Lives With Spouse   Type of Home House   Home Access Ramped entrance   Home Equipment Rollator   Discharge Planning   Patient expects to be discharged to: Skilled nursing facility   Services At/After Discharge   Confirm Follow Up Transport Family     CM met with pt at bedside to discuss dispo and verified demographics. Pt is from home with wife, but transferred here from [facility], plan is for pt to return once medically cleared. DME: rollator. Pt reports 3L O2 at baseline supplied by [employer]. Pt reports needs nebulizer ordered as old one is broken. Pt reports family can transport when cleared for dc.     Rx: [employer]       Advance Care Planning     General Advance Care Planning (ACP) Conversation    Date of Conversation: 3/28/2024  Conducted with: Patient with Decision Making Capacity    Healthcare Decision Maker:    Primary Decision Maker: Migdalia Casillas - Spouse -  [phone]  Click here to complete Healthcare Decision Makers including selection of the Healthcare Decision Maker Relationship (ie \"Primary\").   Today we documented Decision Maker(s) consistent with Legal Next of Kin hierarchy.    Content/Action Overview:  Has ACP document(s) on file - reflects the patient's care preferences          Length of Voluntary ACP Conversation in minutes:  <16 minutes (Non-Billable)    Kenji Wen RN

## 2024-03-28 NOTE — WOUND CARE
IP WOUND CONSULT    Shantanu Casillas  MEDICAL RECORD NUMBER:  [id number]  AGE: 68 y.o.   GENDER: male  : 1955  TODAY'S DATE:  3/28/2024    GENERAL     [] Follow-up   [x] New Consult    Shantanu Casillas is a 68 y.o. male referred by:   [x] Physician  [] Nursing  [] Other:         PAST MEDICAL HISTORY    Past Medical History:   Diagnosis Date    Arthritis     CAD (coronary artery disease)     Chronic obstructive pulmonary disease (HCC)     Diabetes (HCC)     Gout     Hypertension     Ill-defined condition     Sleep apnea     cpap        PAST SURGICAL HISTORY    Past Surgical History:   Procedure Laterality Date    FOOT DEBRIDEMENT Right 12/15/2023    INCISION AND DRAINAGE RIGHT FIRST METATARSAL PHALANGEAL JOINT performed by Polo Oviedo DPM at [facility] MAIN OR    FOOT SURGERY      right heel - foriegn object    HX VEIN ABLATION ADHESIVE      ORTHOPEDIC SURGERY      back surgery    PACEMAKER      aicd -       FAMILY HISTORY    Family History   Problem Relation Age of Onset    Diabetes Mother     Heart Disease Father     Hypertension Father     Diabetes Sister     Diabetes Brother     Hypertension Mother     Heart Disease Mother     Kidney Disease Mother          ALLERGIES    Allergies   Allergen Reactions    Amitriptyline Angioedema    Naproxen      Other reaction(s): Unknown (comments)  Pt not sure of reaction    Sulfa Antibiotics Nausea And Vomiting       MEDICATIONS    No current facility-administered medications on file prior to encounter.     Current Outpatient Medications on File Prior to Encounter   Medication Sig Dispense Refill    predniSONE (DELTASONE) 10 MG tablet Take 2 tablets by mouth daily      methocarbamol (ROBAXIN) 750 MG tablet Take 1 tablet by mouth every 6 hours as needed      Saccharomyces boulardii 250 MG PACK Take 1 capsule by mouth 2 times daily      oxyCODONE (ROXICODONE) 5 MG immediate release tablet Take 2 tablets by mouth 4 times daily. Max Daily Amount: 40 mg      insulin glargine  04:43 PM    POCGLU 225 (H) 03/28/2024 04:16 PM    POCGLU 148 (H) 03/28/2024 12:14 PM    HGB 12.0 (L) 03/27/2024 04:43 PM    HCT 36.3 (L) 03/27/2024 04:43 PM     03/27/2024 04:43 PM     ADULT DIET; Regular; 3 carb choices (45 gm/meal); Low Fat/Low Chol/High Fiber/2 gm Na         ASSESSMENT     Wound Identification & Type: see below  Dressing change: see below  Verbal consent for picture: Yes    Contributing Factors: diabetes, poor glucose control, decreased mobility, shear force, and obesity    Wound 05/10/23 Leg Left;Lateral #1 (Active)   Wound Image   03/28/24 1654   Wound Etiology Venous 03/28/24 1654   Dressing Status New dressing applied 03/28/24 1654   Wound Cleansed Cleansed with saline 03/28/24 1654   Dressing/Treatment Alginate with Ag;Non adherent;Roll gauze;Tape/Soft cloth adhesive tape;Tubular bandage 03/28/24 1654   Dressing Change Due 03/30/24 03/28/24 1654   Wound Length (cm) 4.5 cm 03/07/24 1315   Wound Width (cm) 4.2 cm 03/07/24 1315   Wound Depth (cm) 0.1 cm 03/07/24 1315   Wound Surface Area (cm^2) 18.9 cm^2 03/07/24 1315   Change in Wound Size % (l*w) 97.43 03/07/24 1315   Wound Volume (cm^3) 1.89 cm^3 03/07/24 1315   Wound Healing % 99 03/07/24 1315   Post-Procedure Length (cm) 4.5 cm 03/07/24 1336   Post-Procedure Width (cm) 4.2 cm 03/07/24 1336   Post-Procedure Depth (cm) 0.1 cm 03/07/24 1336   Post-Procedure Surface Area (cm^2) 18.9 cm^2 03/07/24 1336   Post-Procedure Volume (cm^3) 1.89 cm^3 03/07/24 1336   Wound Assessment Pink/red;Subcutaneous 03/28/24 1654   Drainage Amount Small (< 25%) 03/28/24 1654   Drainage Description Serosanguinous 03/28/24 1654   Odor None 03/28/24 1654   Emy-wound Assessment Dry/flaky;Fragile;Hypopigmented 03/28/24 1654   Margins Attached edges 03/28/24 1654   Wound Thickness Description not for Pressure Injury Full thickness 03/28/24 1654   Number of days: 323       Wound 10/11/23 Foot Right;Medial #2 (Active)   Wound Image   03/28/24 1645   Wound Etiology

## 2024-03-28 NOTE — CARE COORDINATION
CM has reviewed pt chart    DCP: from [facility] short [facility], new admission; CM to assess for dc needs

## 2024-03-28 NOTE — PROGRESS NOTES
I have received nephrology consultation  Labs reviewed  Acute kidney injury on chronic kidney disease stage III  Hyponatremia  Metabolic alkalosis  I will hold Entresto and Bumex  Agreed with gentle IV fluids  Renal ultrasound no hydronephrosis

## 2024-03-28 NOTE — CONSULTS
Nephrology Consultation          Patient: Shantanu Casillas MRN: [id number]  SSN: [id number]    YOB: 1955  Age: 68 y.o.  Sex: male      Subjective:   Reason for the consultation.  Acute kidney injury on chronic kidney disease stage III  History of present illness.  The patient is a 68-year-old man with underlying history of diabetes mellitus type 2, hypertension, sleep apnea, morbid obesity, coronary artery disease, LVEF 45%, COPD, gouty arthritis was sent to the ER from [facility] facility for abnormal kidney functions.  His labs showed  and creatinine 2.7 which prompted a nephrology consultation.  His baseline creatinine was 1.2 on 2/19/2024.  He was noticed to be on Bumex and Entresto.  Renal ultrasound no evidence of hydronephrosis.    Past Medical History:   Diagnosis Date    Arthritis     CAD (coronary artery disease)     Chronic obstructive pulmonary disease (HCC)     Diabetes (HCC)     Gout     Hypertension     Ill-defined condition     Sleep apnea     cpap     Past Surgical History:   Procedure Laterality Date    FOOT DEBRIDEMENT Right 12/15/2023    INCISION AND DRAINAGE RIGHT FIRST METATARSAL PHALANGEAL JOINT performed by Polo Oviedo DPM at [facility] MAIN OR    FOOT SURGERY      right heel - foriegn object    HX VEIN ABLATION ADHESIVE      ORTHOPEDIC SURGERY      back surgery    PACEMAKER      aicd -      Family History   Problem Relation Age of Onset    Diabetes Mother     Heart Disease Father     Hypertension Father     Diabetes Sister     Diabetes Brother     Hypertension Mother     Heart Disease Mother     Kidney Disease Mother      Social History     Tobacco Use    Smoking status: Never    Smokeless tobacco: Never   Substance Use Topics    Alcohol use: Not Currently      Current Facility-Administered Medications   Medication Dose Route Frequency Provider Last Rate Last Admin    oxyCODONE HCl (OXY-IR) immediate release tablet 10 mg  10 mg Oral 4x Daily Ed Pino MD      mL IntraVENous PRN Ed Pino MD        Or    dextrose bolus 10% 250 mL  250 mL IntraVENous PRN Ed Pino MD        glucagon injection 1 mg  1 mg SubCUTAneous PRN Ed Pino MD        dextrose 10 % infusion   IntraVENous Continuous PREd Verduzco MD        0.9 % sodium chloride infusion   IntraVENous Continuous Bhupendra Hayden  mL/hr at 03/28/24 1057 New Bag at 03/28/24 1057    sodium chloride flush 0.9 % injection 5-40 mL  5-40 mL IntraVENous 2 times per day Ed Pino MD   10 mL at 03/27/24 2023    sodium chloride flush 0.9 % injection 5-40 mL  5-40 mL IntraVENous PRN Ed Pino MD        0.9 % sodium chloride infusion   IntraVENous PRN Ed Pino MD        potassium chloride (KLOR-CON M) extended release tablet 40 mEq  40 mEq Oral PRN Ed Pino MD        Or    potassium bicarb-citric acid (EFFER-K) effervescent tablet 40 mEq  40 mEq Oral PRN Ed Pino MD        Or    potassium chloride 10 mEq/100 mL IVPB (Peripheral Line)  10 mEq IntraVENous PRN Ed Pino MD        magnesium sulfate 2000 mg in 50 mL IVPB premix  2,000 mg IntraVENous PRN Ed Pino MD        ondansetron (ZOFRAN-ODT) disintegrating tablet 4 mg  4 mg Oral Q8H PRN Ed Pino MD        Or    ondansetron (ZOFRAN) injection 4 mg  4 mg IntraVENous Q6H PRN Ed Pino MD        polyethylene glycol (GLYCOLAX) packet 17 g  17 g Oral Daily PRN Ed Pino MD        acetaminophen (TYLENOL) tablet 650 mg  650 mg Oral Q6H PRN Ed Pino MD        Or    acetaminophen (TYLENOL) suppository 650 mg  650 mg Rectal Q6H PRN Ed Pino MD        heparin (porcine) injection 5,000 Units  5,000 Units SubCUTAneous 3 times per day Ed Pino MD   5,000 Units at 03/28/24 1537    tiotropium (SPIRIVA RESPIMAT) 2.5 MCG/ACT inhaler 2 puff  2 puff Inhalation Daily Ed Pino MD   2 puff at 03/28/24 0860    budesonide-formoterol (SYMBICORT) 160-4.5

## 2024-03-29 ENCOUNTER — APPOINTMENT (OUTPATIENT)
Facility: HOSPITAL | Age: 69
DRG: 683 | End: 2024-03-29
Payer: MEDICARE

## 2024-03-29 LAB
ALBUMIN SERPL-MCNC: 2.9 G/DL (ref 3.5–5)
ALBUMIN SERPL-MCNC: 3 G/DL (ref 3.5–5)
ALBUMIN SERPL-MCNC: 3 G/DL (ref 3.5–5)
ALBUMIN/GLOB SERPL: 0.7 (ref 1.1–2.2)
ALBUMIN/GLOB SERPL: 0.7 (ref 1.1–2.2)
ALP SERPL-CCNC: 67 U/L (ref 45–117)
ALP SERPL-CCNC: 68 U/L (ref 45–117)
ALT SERPL-CCNC: 32 U/L (ref 12–78)
ALT SERPL-CCNC: 34 U/L (ref 12–78)
ANION GAP SERPL CALC-SCNC: 4 MMOL/L (ref 5–15)
ANION GAP SERPL CALC-SCNC: 5 MMOL/L (ref 5–15)
ANION GAP SERPL CALC-SCNC: 6 MMOL/L (ref 5–15)
AST SERPL W P-5'-P-CCNC: 27 U/L (ref 15–37)
AST SERPL W P-5'-P-CCNC: 27 U/L (ref 15–37)
BASOPHILS # BLD: 0 K/UL (ref 0–0.1)
BASOPHILS NFR BLD: 0 % (ref 0–1)
BILIRUB SERPL-MCNC: 0.7 MG/DL (ref 0.2–1)
BILIRUB SERPL-MCNC: 0.7 MG/DL (ref 0.2–1)
BUN SERPL-MCNC: 85 MG/DL (ref 6–20)
BUN SERPL-MCNC: 87 MG/DL (ref 6–20)
BUN SERPL-MCNC: 90 MG/DL (ref 6–20)
BUN/CREAT SERPL: 49 (ref 12–20)
BUN/CREAT SERPL: 53 (ref 12–20)
BUN/CREAT SERPL: 56 (ref 12–20)
CA-I BLD-MCNC: 8.7 MG/DL (ref 8.5–10.1)
CA-I BLD-MCNC: 9 MG/DL (ref 8.5–10.1)
CA-I BLD-MCNC: 9.1 MG/DL (ref 8.5–10.1)
CHLORIDE SERPL-SCNC: 101 MMOL/L (ref 97–108)
CHLORIDE SERPL-SCNC: 103 MMOL/L (ref 97–108)
CHLORIDE SERPL-SCNC: 103 MMOL/L (ref 97–108)
CO2 SERPL-SCNC: 31 MMOL/L (ref 21–32)
CO2 SERPL-SCNC: 33 MMOL/L (ref 21–32)
CO2 SERPL-SCNC: 33 MMOL/L (ref 21–32)
CREAT SERPL-MCNC: 1.61 MG/DL (ref 0.7–1.3)
CREAT SERPL-MCNC: 1.64 MG/DL (ref 0.7–1.3)
CREAT SERPL-MCNC: 1.75 MG/DL (ref 0.7–1.3)
DIFFERENTIAL METHOD BLD: ABNORMAL
EOSINOPHIL # BLD: 0.1 K/UL (ref 0–0.4)
EOSINOPHIL NFR BLD: 1 % (ref 0–7)
ERYTHROCYTE [DISTWIDTH] IN BLOOD BY AUTOMATED COUNT: 14.7 % (ref 11.5–14.5)
GLOBULIN SER CALC-MCNC: 4 G/DL (ref 2–4)
GLOBULIN SER CALC-MCNC: 4.2 G/DL (ref 2–4)
GLUCOSE BLD STRIP.AUTO-MCNC: 149 MG/DL (ref 65–100)
GLUCOSE BLD STRIP.AUTO-MCNC: 346 MG/DL (ref 65–100)
GLUCOSE BLD STRIP.AUTO-MCNC: 97 MG/DL (ref 65–100)
GLUCOSE SERPL-MCNC: 124 MG/DL (ref 65–100)
GLUCOSE SERPL-MCNC: 125 MG/DL (ref 65–100)
GLUCOSE SERPL-MCNC: 206 MG/DL (ref 65–100)
HCT VFR BLD AUTO: 36.1 % (ref 36.6–50.3)
HGB BLD-MCNC: 11.5 G/DL (ref 12.1–17)
IMM GRANULOCYTES # BLD AUTO: 0.1 K/UL (ref 0–0.04)
IMM GRANULOCYTES NFR BLD AUTO: 1 % (ref 0–0.5)
LYMPHOCYTES # BLD: 1.1 K/UL (ref 0.8–3.5)
LYMPHOCYTES NFR BLD: 13 % (ref 12–49)
MCH RBC QN AUTO: 30.3 PG (ref 26–34)
MCHC RBC AUTO-ENTMCNC: 31.9 G/DL (ref 30–36.5)
MCV RBC AUTO: 95.3 FL (ref 80–99)
MONOCYTES # BLD: 0.8 K/UL (ref 0–1)
MONOCYTES NFR BLD: 9 % (ref 5–13)
NEUTS SEG # BLD: 6.1 K/UL (ref 1.8–8)
NEUTS SEG NFR BLD: 76 % (ref 32–75)
NRBC # BLD: 0 K/UL (ref 0–0.01)
NRBC BLD-RTO: 0 PER 100 WBC
PERFORMED BY:: ABNORMAL
PERFORMED BY:: ABNORMAL
PERFORMED BY:: NORMAL
PHOSPHATE SERPL-MCNC: 3.1 MG/DL (ref 2.6–4.7)
PLATELET # BLD AUTO: 190 K/UL (ref 150–400)
PMV BLD AUTO: 11.4 FL (ref 8.9–12.9)
POTASSIUM SERPL-SCNC: 3.4 MMOL/L (ref 3.5–5.1)
POTASSIUM SERPL-SCNC: 3.4 MMOL/L (ref 3.5–5.1)
POTASSIUM SERPL-SCNC: 3.5 MMOL/L (ref 3.5–5.1)
PROT SERPL-MCNC: 6.9 G/DL (ref 6.4–8.2)
PROT SERPL-MCNC: 7.2 G/DL (ref 6.4–8.2)
RBC # BLD AUTO: 3.79 M/UL (ref 4.1–5.7)
SODIUM SERPL-SCNC: 138 MMOL/L (ref 136–145)
SODIUM SERPL-SCNC: 140 MMOL/L (ref 136–145)
SODIUM SERPL-SCNC: 141 MMOL/L (ref 136–145)
WBC # BLD AUTO: 8.1 K/UL (ref 4.1–11.1)

## 2024-03-29 PROCEDURE — 97161 PT EVAL LOW COMPLEX 20 MIN: CPT

## 2024-03-29 PROCEDURE — 2700000000 HC OXYGEN THERAPY PER DAY

## 2024-03-29 PROCEDURE — 6370000000 HC RX 637 (ALT 250 FOR IP): Performed by: INTERNAL MEDICINE

## 2024-03-29 PROCEDURE — 36415 COLL VENOUS BLD VENIPUNCTURE: CPT

## 2024-03-29 PROCEDURE — 2580000003 HC RX 258: Performed by: FAMILY MEDICINE

## 2024-03-29 PROCEDURE — 2500000003 HC RX 250 WO HCPCS: Performed by: FAMILY MEDICINE

## 2024-03-29 PROCEDURE — 6360000002 HC RX W HCPCS: Performed by: FAMILY MEDICINE

## 2024-03-29 PROCEDURE — 80069 RENAL FUNCTION PANEL: CPT

## 2024-03-29 PROCEDURE — 97165 OT EVAL LOW COMPLEX 30 MIN: CPT

## 2024-03-29 PROCEDURE — 6370000000 HC RX 637 (ALT 250 FOR IP): Performed by: FAMILY MEDICINE

## 2024-03-29 PROCEDURE — 1100000000 HC RM PRIVATE

## 2024-03-29 PROCEDURE — 71045 X-RAY EXAM CHEST 1 VIEW: CPT

## 2024-03-29 PROCEDURE — 85025 COMPLETE CBC W/AUTO DIFF WBC: CPT

## 2024-03-29 PROCEDURE — 94761 N-INVAS EAR/PLS OXIMETRY MLT: CPT

## 2024-03-29 PROCEDURE — 97530 THERAPEUTIC ACTIVITIES: CPT

## 2024-03-29 PROCEDURE — 94660 CPAP INITIATION&MGMT: CPT

## 2024-03-29 PROCEDURE — 80053 COMPREHEN METABOLIC PANEL: CPT

## 2024-03-29 PROCEDURE — 97535 SELF CARE MNGMENT TRAINING: CPT

## 2024-03-29 PROCEDURE — 94640 AIRWAY INHALATION TREATMENT: CPT

## 2024-03-29 PROCEDURE — 82962 GLUCOSE BLOOD TEST: CPT

## 2024-03-29 RX ADMIN — OXYCODONE HYDROCHLORIDE AND ACETAMINOPHEN 1000 MG: 500 TABLET ORAL at 21:42

## 2024-03-29 RX ADMIN — INSULIN GLARGINE 34 UNITS: 100 INJECTION, SOLUTION SUBCUTANEOUS at 21:41

## 2024-03-29 RX ADMIN — Medication 400 UNITS: at 21:42

## 2024-03-29 RX ADMIN — COLCHICINE 0.6 MG: 0.6 TABLET ORAL at 10:23

## 2024-03-29 RX ADMIN — PANTOPRAZOLE SODIUM 40 MG: 40 TABLET, DELAYED RELEASE ORAL at 05:56

## 2024-03-29 RX ADMIN — FLUTICASONE PROPIONATE 1 SPRAY: 50 SPRAY, METERED NASAL at 21:42

## 2024-03-29 RX ADMIN — HEPARIN SODIUM 5000 UNITS: 5000 INJECTION INTRAVENOUS; SUBCUTANEOUS at 13:26

## 2024-03-29 RX ADMIN — Medication 1 CAPSULE: at 10:23

## 2024-03-29 RX ADMIN — OXYCODONE HYDROCHLORIDE 10 MG: 10 TABLET ORAL at 17:31

## 2024-03-29 RX ADMIN — SODIUM CHLORIDE, PRESERVATIVE FREE 10 ML: 5 INJECTION INTRAVENOUS at 21:43

## 2024-03-29 RX ADMIN — HEPARIN SODIUM 5000 UNITS: 5000 INJECTION INTRAVENOUS; SUBCUTANEOUS at 21:42

## 2024-03-29 RX ADMIN — GABAPENTIN 400 MG: 400 CAPSULE ORAL at 10:23

## 2024-03-29 RX ADMIN — OXYCODONE HYDROCHLORIDE AND ACETAMINOPHEN 1000 MG: 500 TABLET ORAL at 10:24

## 2024-03-29 RX ADMIN — BUDESONIDE AND FORMOTEROL FUMARATE DIHYDRATE 2 PUFF: 160; 4.5 AEROSOL RESPIRATORY (INHALATION) at 09:44

## 2024-03-29 RX ADMIN — ASPIRIN 81 MG 81 MG: 81 TABLET ORAL at 10:24

## 2024-03-29 RX ADMIN — OXYCODONE HYDROCHLORIDE 10 MG: 10 TABLET ORAL at 13:13

## 2024-03-29 RX ADMIN — FLUTICASONE PROPIONATE 1 SPRAY: 50 SPRAY, METERED NASAL at 10:36

## 2024-03-29 RX ADMIN — BUDESONIDE AND FORMOTEROL FUMARATE DIHYDRATE 2 PUFF: 160; 4.5 AEROSOL RESPIRATORY (INHALATION) at 21:04

## 2024-03-29 RX ADMIN — INSULIN LISPRO 4 UNITS: 100 INJECTION, SOLUTION INTRAVENOUS; SUBCUTANEOUS at 21:41

## 2024-03-29 RX ADMIN — HEPARIN SODIUM 5000 UNITS: 5000 INJECTION INTRAVENOUS; SUBCUTANEOUS at 05:56

## 2024-03-29 RX ADMIN — Medication 400 UNITS: at 10:23

## 2024-03-29 RX ADMIN — ALLOPURINOL 200 MG: 100 TABLET ORAL at 10:23

## 2024-03-29 RX ADMIN — OXYCODONE HYDROCHLORIDE 10 MG: 10 TABLET ORAL at 10:24

## 2024-03-29 RX ADMIN — SODIUM CHLORIDE, PRESERVATIVE FREE 10 ML: 5 INJECTION INTRAVENOUS at 10:24

## 2024-03-29 RX ADMIN — TIOTROPIUM BROMIDE INHALATION SPRAY 2 PUFF: 3.12 SPRAY, METERED RESPIRATORY (INHALATION) at 09:44

## 2024-03-29 RX ADMIN — ATORVASTATIN CALCIUM 40 MG: 40 TABLET, FILM COATED ORAL at 21:42

## 2024-03-29 RX ADMIN — OXYCODONE HYDROCHLORIDE 10 MG: 10 TABLET ORAL at 21:41

## 2024-03-29 ASSESSMENT — PAIN DESCRIPTION - LOCATION
LOCATION: NECK;BACK;LEG
LOCATION: GENERALIZED
LOCATION: GENERALIZED
LOCATION: OTHER (COMMENT)
LOCATION: GENERALIZED

## 2024-03-29 ASSESSMENT — PAIN - FUNCTIONAL ASSESSMENT: PAIN_FUNCTIONAL_ASSESSMENT: PREVENTS OR INTERFERES SOME ACTIVE ACTIVITIES AND ADLS

## 2024-03-29 ASSESSMENT — PAIN SCALES - GENERAL
PAINLEVEL_OUTOF10: 0
PAINLEVEL_OUTOF10: 9
PAINLEVEL_OUTOF10: 10
PAINLEVEL_OUTOF10: 7
PAINLEVEL_OUTOF10: 9
PAINLEVEL_OUTOF10: 10
PAINLEVEL_OUTOF10: 0

## 2024-03-29 ASSESSMENT — PAIN DESCRIPTION - DESCRIPTORS
DESCRIPTORS: ACHING

## 2024-03-29 NOTE — PROGRESS NOTES
History and Physical    NAME:   Shantanu Casillas   :  1955   MRN:  [id number]     Date/Time: 3/29/2024 11:27 AM    Patient PCP: Ed Pino MD  ______________________________________________________________________       Subjective:     CHIEF COMPLAINT:     Abnormal labs        HISTORY OF PRESENT ILLNESS:     General Daily Progress Note  Patient is a 68 y.o. year old male past medical history of COPD type 2 diabetes hypertension gout COPD CHF ejection fraction 4045% venous stasis ulcers chronic kidney disease obstructive sleep apnea who recently discharged from the hospital more than a month ago with community-acquired pneumonia at the nursing facility blood work done shows today acute kidney injury seen by the ER physician recommended patient to be admitted for further evaluation treatment patient have ultrasound of the kidneys done which is pending    3/28  Patient alert awake denies any chest pain or shortness of breath or nausea no vomiting feeling better    3/29    Patient alert in no distress complaining of cough congestion    Past Medical History:   Diagnosis Date    Arthritis     CAD (coronary artery disease)     Chronic obstructive pulmonary disease (HCC)     Diabetes (HCC)     Gout     Hypertension     Ill-defined condition     Sleep apnea     cpap        Past Surgical History:   Procedure Laterality Date    FOOT DEBRIDEMENT Right 12/15/2023    INCISION AND DRAINAGE RIGHT FIRST METATARSAL PHALANGEAL JOINT performed by Polo Oviedo DPM at [facility] MAIN OR    FOOT SURGERY      right heel - foriegn object    HX VEIN ABLATION ADHESIVE      ORTHOPEDIC SURGERY      back surgery    PACEMAKER      aicd -       Social History     Tobacco Use    Smoking status: Never    Smokeless tobacco: Never   Substance Use Topics    Alcohol use: Not Currently        Family History   Problem Relation Age of Onset    Diabetes Mother     Heart Disease Father     Hypertension Father     Diabetes Sister     Diabetes Brother  mLs into the lungs every 6 hours while awake 6/20/23   Ed Pino MD   albuterol sulfate HFA (PROVENTIL;VENTOLIN;PROAIR) 108 (90 Base) MCG/ACT inhaler Inhale 1 puff into the lungs every 4 hours as needed 9/14/22   Doretha Mccarthy MD   ascorbic acid (VITAMIN C) 500 MG tablet Take 2 tablets by mouth 2 times daily    Doretha Mccarthy MD   aspirin 81 MG chewable tablet Take 1 tablet by mouth daily    Doretha Mccarthy MD   atorvastatin (LIPITOR) 40 MG tablet Take 1 tablet by mouth nightly at bedtime. 4/24/23   Doretha Mccarthy MD   B Complex Vitamins (VITAMIN B COMPLEX) TABS Take 1 tablet by mouth daily    Doretha Mccarthy MD   colchicine (COLCRYS) 0.6 MG tablet Take 1 tablet by mouth daily 4/6/23   Doretha Mccarthy MD   flunisolide (NASALIDE) 25 MCG/ACT (0.025%) SOLN 2 sprays 2 times daily 9/9/22   Doretha Mccarthy MD   fluticasone-umeclidin-vilant (TRELEGY ELLIPTA) 100-62.5-25 MCG/ACT AEPB inhaler INHALE 1 INHALATION BY MOUTH ONCE DAILY 1/4/22   Doretha Mccarthy MD   vitamin E 400 UNIT capsule Take 1 capsule by mouth 2 times daily 6/8/21   Doretha Mccarthy MD         Current Facility-Administered Medications:     oxyCODONE HCl (OXY-IR) immediate release tablet 10 mg, 10 mg, Oral, 4x Daily, Ed Pino MD, 10 mg at 03/29/24 1024    pantoprazole (PROTONIX) tablet 40 mg, 40 mg, Oral, QAM AC, Ed Pino MD, 40 mg at 03/29/24 0556    gabapentin (NEURONTIN) capsule 400 mg, 400 mg, Oral, Daily, Bhupendra Hayden MD, 400 mg at 03/29/24 1023    albuterol sulfate HFA (PROVENTIL;VENTOLIN;PROAIR) 108 (90 Base) MCG/ACT inhaler 1 puff, 1 puff, Inhalation, Q4H PRN, Ed Pino MD    allopurinol (ZYLOPRIM) tablet 200 mg, 200 mg, Oral, Daily, Ed Pino MD, 200 mg at 03/29/24 1023    ammonium lactate (AMLACTIN) 12 % cream, , Topical, TID PRN, Ed Pino MD    ascorbic acid (VITAMIN C) tablet 1,000 mg, 1,000 mg, Oral, BID, Ed Pino MD, 1,000 mg  mg, Oral, Daily, Bhupendra Hayden MD, 400 mg at 03/29/24 1023    albuterol sulfate HFA (PROVENTIL;VENTOLIN;PROAIR) 108 (90 Base) MCG/ACT inhaler 1 puff, 1 puff, Inhalation, Q4H PRN, Ed Pino MD    allopurinol (ZYLOPRIM) tablet 200 mg, 200 mg, Oral, Daily, Ed Pino MD, 200 mg at 03/29/24 1023    ammonium lactate (AMLACTIN) 12 % cream, , Topical, TID PRN, Ed Pino MD    ascorbic acid (VITAMIN C) tablet 1,000 mg, 1,000 mg, Oral, BID, Ed Pino MD, 1,000 mg at 03/29/24 1024    aspirin chewable tablet 81 mg, 81 mg, Oral, Daily, Ed Pino MD, 81 mg at 03/29/24 1024    atorvastatin (LIPITOR) tablet 40 mg, 40 mg, Oral, Nightly, Ed Pino MD, 40 mg at 03/28/24 2043    vitamin B complex CAPS 1 capsule, 1 capsule, Oral, Daily, Ed Pino MD, 1 capsule at 03/29/24 1023    colchicine (COLCRYS) tablet 0.6 mg, 0.6 mg, Oral, Daily, Ed Pino MD, 0.6 mg at 03/29/24 1023    [Held by provider] sacubitril-valsartan (ENTRESTO) 24-26 MG per tablet 1 tablet, 1 tablet, Oral, BID, Ed Pino MD    fluticasone (FLONASE) 50 MCG/ACT nasal spray 1 spray, 1 spray, Each Nostril, BID, Ed Pnio MD, 1 spray at 03/29/24 1036    insulin glargine (LANTUS) injection vial 34 Units, 34 Units, SubCUTAneous, Nightly, Ed Pino MD, 34 Units at 03/28/24 2043    vitamin E capsule 400 Units, 400 Units, Oral, BID, Ed Pino MD, 400 Units at 03/29/24 1023    insulin lispro (HUMALOG) injection vial 0-16 Units, 0-16 Units, SubCUTAneous, TID WC, Ed Pino MD, 4 Units at 03/28/24 1807    insulin lispro (HUMALOG) injection vial 0-4 Units, 0-4 Units, SubCUTAneous, Nightly, Ed Pino MD    glucose chewable tablet 16 g, 4 tablet, Oral, PRN, Ed Pino MD    dextrose bolus 10% 125 mL, 125 mL, IntraVENous, PRN **OR** dextrose bolus 10% 250 mL, 250 mL, IntraVENous, PRN, Ed Pino MD    glucagon injection 1 mg, 1 mg, SubCUTAneous, PRN, Alvarez,

## 2024-03-29 NOTE — PROGRESS NOTES
PHYSICAL THERAPY EVALUATION  Patient: Shantanu Casillas (68 y.o. male)  Date: 3/29/2024  Primary Diagnosis: DEMARCO (acute kidney injury) (HCC) [N17.9]       Precautions: Restrictions/Precautions  Restrictions/Precautions: Fall Risk, Contact Precautions     Recommendations for nursing mobility: Out of bed to chair for meals, Frequent repositioning to prevent skin breakdown, LE elevation for management of edema, AD and gt belt for bed to chair , Amb to bathroom with AD and gait belt, and Assist x1    In place during session: Peripheral IV, Nasal Cannula 3L, External Catheter, and EKG/telemetry     ASSESSMENT  Pt is a 68 y.o. male admitted on 3/27/2024 for abnormal labs; pt currently being treated for DEMARCO, COPD, DMII, HTN, JAYESH, CHF with EF ~40-45%, venous stasis ulcers. Pt semi-supine in bed upon PT/OT arrival, agreeable to evaluation. Pt A&O x 4.     Based on the objective data described below, the patient currently presents with impaired functional mobility, decreased independence in ADLs, decreased ROM, impaired strength, decreased activity tolerance, impaired balance, and impaired posture. (See below for objective details and assist levels).     Overall pt tolerated session fair today with no c/o pain throughout session. Pt required min A for bed mobility and transfers; pt did require additional time as well as elevation of bed to perform sit to stand transfers. Pt amb 15 feet x2 (bed<> bathroom) with RW, gt belt and CGA with additional time; demonstrates WBOS with short, shuffling, step to gt pattern with no LOB or knee buckling noted. Pt will benefit from continued skilled PT to address above deficits and return to PLOF. Potential barriers for safe discharge: pt is a high fall risk. Current PT DC recommendation Skilled Nursing Facility once medically appropriate.    GOALS:    Problem: Physical Therapy - Adult  Goal: By Discharge: Performs mobility at highest level of function for planned discharge setting.  See  to the patient    Activity Tolerance:   Fair , requires frequent rest breaks, and desaturates with activity and requires oxygen    After treatment patient left in no apparent distress:   Bed locked and in lowest position Call bell within reach and seated safely EOB  and nsg updated.    COMMUNICATION/EDUCATION:   The patient’s plan of care was discussed with: Occupational therapist, Registered nurse, and Case management     Patient Education  Education Given To: Patient  Education Provided: Role of Therapy;Plan of Care;Fall Prevention Strategies;Transfer Training;Equipment  Education Provided Comments: DC Recommendations, PT POC, safety with mobility, use of RW and sequencing  Education Method: Demonstration;Verbal  Barriers to Learning: None  Education Outcome: Verbalized understanding;Continued education needed    PT/OT sessions occurred together for increased safety of pt and clinician.     Thank you for this referral.  Lorena Alejandra, PT, DPT  Minutes: 33

## 2024-03-29 NOTE — CONSULTS
PULMONARY CONSULT  [facility]    Name: Shantanu Casillas MRN: [id number]   : 1955 Hospital: [facility]   Date: 3/29/2024  Admission date: 3/27/2024 Hospital Day: 3       HPI:     Patient Active Problem List   Diagnosis    Onychomycosis    Non-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 (HCC)    Idiopathic chronic venous hypertension of left lower extremity with ulcer (HCC)    Ulcer of left foot, with fat layer exposed (HCC)    Cellulitis    COVID-19    Venous ulcer (HCC)    Hyperkeratosis    Localized edema    Type 2 diabetes mellitus with diabetic polyneuropathy, with long-term current use of insulin (HCC)    Cellulitis and abscess of right leg    Chronic ulcer of right heel limited to breakdown of skin (HCC)    Calf ulcer, left, with fat layer exposed (HCC)    Ankle ulcer, right, with fat layer exposed (HCC)    Non-pressure chronic ulcer of other part of right lower leg with fat layer exposed (HCC)    DEMARCO (acute kidney injury) (HCC)    Back pain    Acute on chronic systolic CHF (congestive heart failure), NYHA class 2 (HCC)    Shortness of breath    Acute on chronic congestive heart failure (HCC)    Wound infection    Venous stasis ulcer (HCC)    Open wound of right foot    Atherosclerotic heart disease of native coronary artery without angina pectoris    Chronic obstructive pulmonary disease, unspecified (HCC)    Chronic pain disorder    Dependence on supplemental oxygen    Difficulty in walking, not elsewhere classified    Gout, unspecified    Hypertensive heart disease with heart failure (HCC)    Morbid (severe) obesity due to excess calories (HCC)    Muscle weakness (generalized)    Obstructive sleep apnea (adult) (pediatric)    Other lack of coordination    Other symptoms and signs involving the musculoskeletal system    Personal history of COVID-19    Presence of cardiac pacemaker    Type 2 diabetes mellitus with diabetic peripheral angiopathy

## 2024-03-29 NOTE — CARE COORDINATION
DCP: return to [facility] (short term)    1057: CM sent message to [facility] inquiring about acceptance if cleared for today. Awaiting reply.     1144: CM called attending to ask if pt would be able to dc today as [facility] was inquiring if pt was medically ready. Pt likely not ready for dc today. CM updated [facility].

## 2024-03-29 NOTE — PROGRESS NOTES
OCCUPATIONAL THERAPY EVALUATION  Patient: Shantanu Casillas (68 y.o. male)  Date: 3/29/2024  Primary Diagnosis: DEMARCO (acute kidney injury) ([facility]) [N17.9]       Precautions: General precautions Fall Risk, Contact Precautions                Recommendations for nursing mobility: Out of bed to chair for meals, Encourage HEP in prep for ADLs/mobility; see handout for details, Frequent repositioning to prevent skin breakdown, LE elevation for management of edema, AD and gt belt for bed to chair , Amb to bathroom with AD and gait belt, and Assist x1    In place during session:Nasal Cannula 3L, External Catheter, and EKG/telemetry   ASSESSMENT  Pt is a 68 y.o. male presenting to [facility] past medical history of COPD type 2 diabetes hypertension gout COPD CHF ejection fraction 4045% venous stasis ulcers chronic kidney disease obstructive sleep apnea who recently discharged from the hospital more than a month ago with community-acquired pneumonia at the nursing facility blood work done shows today acute kidney injury, admitted 3/27/24 and currently being treated for DEMARCO. Pt received semi-supine in bed upon arrival, AXO x4, and agreeable to OT evaluation.     Based on current observations, pt presents with decreased  functional mobility, independence in ADLs, high-level IADLs, strength, activity tolerance, safety awareness, balance, posture (see below for objective details and assist levels).     Overall, pt tolerates session fair with no c/o of pain. Pt in semi supine reports having bowel movement. Pt completed rolling R/L with min a and using bed rails for max assist for posterior kaylah care. Pt supine to sit eob with min a for trunk management with head of bed elevated. Pt sitting balance fair with sba for safety. Pt AROM limited by habitus but wfl. Sit to stand with RW and min a x2 for controlled ascension and balance. Pt completed short house hold distance to bathroom to complete toilet transfer with CGA and use of RW. Pt stand  Oriented X4  Cognition  Overall Cognitive Status: WFL    Skin: Intact    Edema: BLE wrapped, mild edema noted    Hearing:   Hearing  Hearing: Within functional limits    Vision/Perceptual:          Vision  Vision: Within Functional Limits       Range of Motion:     AROM: Generally decreased, functional  PROM: Generally decreased, functional    Strength:    Strength: Generally decreased, functional    Coordination:  Coordination: Within functional limits            Tone & Sensation:     Tone: Normal  Sensation: Intact      Functional Mobility and Transfers for ADLs:  Bed Mobility:  Bed Mobility Training  Bed Mobility Training: Yes  Overall Level of Assistance: Minimum assistance;Assist X1;Additional time  Rolling: Minimum assistance;Assist X1;Additional time  Supine to Sit: Minimum assistance;Assist X1;Additional time    Transfers:  Transfer Training  Transfer Training: Yes  Overall Level of Assistance: Minimum assistance;Contact-guard assistance;Assist X1;Adaptive equipment  Sit to Stand: Minimum assistance;Contact-guard assistance;Assist X2;Adaptive equipment  Stand to Sit: Contact-guard assistance;Assist X1;Minimum assistance  Toilet Transfer: Minimum assistance;Contact-guard assistance;Assist X1;Adaptive equipment      Balance:  Balance  Sitting: Intact  Standing: Impaired  Standing - Static: Good  Standing - Dynamic: Fair      ADL Assessment:          Feeding: Setup       Grooming: Setup                               LE Dressing: Maximum assistance       Toileting: Maximum assistance  Toileting Skilled Clinical Factors: While supine in bed           Therapeutic Exercises:       Functional Measure:    [facility] AM-PACTM \"6 Clicks\"                                                       Daily Activity Inpatient Short Form  How much help from another person does the patient currently need... Total; A Lot A Little None   1.  Putting on and taking off regular lower body clothing? []  1 [x]  2 []  3 []  4   2.

## 2024-03-29 NOTE — PROGRESS NOTES
Spiritual Care Assessment/Progress Note  [facility]    Name: Shantanu Casillas MRN: [id number]    Age: 68 y.o.     Sex: male   Language: English     Date: 3/29/2024            Total Time Calculated: 19 min              Spiritual Assessment begun in SSR [facility]  Service Provided For:: Patient  Referral/Consult From:: Rounding  Encounter Overview/Reason : Initial Encounter    Spiritual beliefs:      [x] Involved in a hosea tradition/spiritual practice:      [] Supported by a hosea community:      [] Claims no spiritual orientation:      [] Seeking spiritual identity:           [] Adheres to an individual form of spirituality:      [] Not able to assess:                Identified resources for coping and support system:   Support System: Family members, Other (Comment) ( and Evangelical family)       [] Prayer                  [] Devotional reading               [] Music                  [] Guided Imagery     [] Pet visits                                        [] Other: (COMMENT)     Specific area/focus of visit   Encounter:    Crisis:    Spiritual/Emotional needs: Type: Spiritual Support  Ritual, Rites and Sacraments:    Grief, Loss, and Adjustments:    Ethics/Mediation:    Behavioral Health:    Palliative Care:    Advance Care Planning:      Plan/Referrals: Other (Comment) ( available as needed)    Narrative:  visited with patient in Room 563. T the time of the visit he appeared tired and sleepy.    He articulated being frustrated with a lack of sleep, that his meals are not always timely and feeling concern for friends who are in the nursing home with him that are lonely. Patient communicated that he feels great compassion for others and will do what is necessary for the elderly to ease their pain despite being in a nursing home as well.     listened empathically, affirmed/explored his feelings, thoughts and concerns, provided prayer and the ministry of presence.        Patient expressed gratitude for  listening to his concerns and  making a visit.     Patient advised that  available as needed.  Chaplain Ирина Cason M.Div.

## 2024-03-30 LAB
ALBUMIN SERPL-MCNC: 3 G/DL (ref 3.5–5)
ANION GAP SERPL CALC-SCNC: 6 MMOL/L (ref 5–15)
BNP SERPL-MCNC: 426 PG/ML
BUN SERPL-MCNC: 61 MG/DL (ref 6–20)
BUN/CREAT SERPL: 43 (ref 12–20)
CA-I BLD-MCNC: 9.1 MG/DL (ref 8.5–10.1)
CHLORIDE SERPL-SCNC: 105 MMOL/L (ref 97–108)
CO2 SERPL-SCNC: 32 MMOL/L (ref 21–32)
CREAT SERPL-MCNC: 1.41 MG/DL (ref 0.7–1.3)
ERYTHROCYTE [DISTWIDTH] IN BLOOD BY AUTOMATED COUNT: 14.9 % (ref 11.5–14.5)
GLUCOSE BLD STRIP.AUTO-MCNC: 113 MG/DL (ref 65–100)
GLUCOSE BLD STRIP.AUTO-MCNC: 220 MG/DL (ref 65–100)
GLUCOSE BLD STRIP.AUTO-MCNC: 228 MG/DL (ref 65–100)
GLUCOSE BLD STRIP.AUTO-MCNC: 251 MG/DL (ref 65–100)
GLUCOSE SERPL-MCNC: 111 MG/DL (ref 65–100)
HCT VFR BLD AUTO: 37.3 % (ref 36.6–50.3)
HGB BLD-MCNC: 11.7 G/DL (ref 12.1–17)
MCH RBC QN AUTO: 30.1 PG (ref 26–34)
MCHC RBC AUTO-ENTMCNC: 31.4 G/DL (ref 30–36.5)
MCV RBC AUTO: 95.9 FL (ref 80–99)
NRBC # BLD: 0 K/UL (ref 0–0.01)
NRBC BLD-RTO: 0 PER 100 WBC
PERFORMED BY:: ABNORMAL
PHOSPHATE SERPL-MCNC: 3 MG/DL (ref 2.6–4.7)
PLATELET # BLD AUTO: 180 K/UL (ref 150–400)
PMV BLD AUTO: 11.3 FL (ref 8.9–12.9)
POTASSIUM SERPL-SCNC: 3.6 MMOL/L (ref 3.5–5.1)
RBC # BLD AUTO: 3.89 M/UL (ref 4.1–5.7)
SODIUM SERPL-SCNC: 143 MMOL/L (ref 136–145)
WBC # BLD AUTO: 6.2 K/UL (ref 4.1–11.1)

## 2024-03-30 PROCEDURE — 2580000003 HC RX 258: Performed by: INTERNAL MEDICINE

## 2024-03-30 PROCEDURE — 36415 COLL VENOUS BLD VENIPUNCTURE: CPT

## 2024-03-30 PROCEDURE — 1100000000 HC RM PRIVATE

## 2024-03-30 PROCEDURE — 6370000000 HC RX 637 (ALT 250 FOR IP): Performed by: INTERNAL MEDICINE

## 2024-03-30 PROCEDURE — 2580000003 HC RX 258: Performed by: FAMILY MEDICINE

## 2024-03-30 PROCEDURE — 6370000000 HC RX 637 (ALT 250 FOR IP): Performed by: FAMILY MEDICINE

## 2024-03-30 PROCEDURE — 6360000002 HC RX W HCPCS: Performed by: FAMILY MEDICINE

## 2024-03-30 PROCEDURE — 82962 GLUCOSE BLOOD TEST: CPT

## 2024-03-30 PROCEDURE — 83880 ASSAY OF NATRIURETIC PEPTIDE: CPT

## 2024-03-30 PROCEDURE — 85027 COMPLETE CBC AUTOMATED: CPT

## 2024-03-30 PROCEDURE — 80069 RENAL FUNCTION PANEL: CPT

## 2024-03-30 PROCEDURE — 2500000003 HC RX 250 WO HCPCS: Performed by: FAMILY MEDICINE

## 2024-03-30 PROCEDURE — 94640 AIRWAY INHALATION TREATMENT: CPT

## 2024-03-30 RX ORDER — IPRATROPIUM BROMIDE 21 UG/1
2 SPRAY, METERED NASAL 3 TIMES DAILY
Status: DISCONTINUED | OUTPATIENT
Start: 2024-03-30 | End: 2024-04-02 | Stop reason: HOSPADM

## 2024-03-30 RX ADMIN — IPRATROPIUM BROMIDE 2 SPRAY: 21 SPRAY NASAL at 16:55

## 2024-03-30 RX ADMIN — HEPARIN SODIUM 5000 UNITS: 5000 INJECTION INTRAVENOUS; SUBCUTANEOUS at 05:33

## 2024-03-30 RX ADMIN — HEPARIN SODIUM 5000 UNITS: 5000 INJECTION INTRAVENOUS; SUBCUTANEOUS at 14:11

## 2024-03-30 RX ADMIN — OXYCODONE HYDROCHLORIDE 10 MG: 10 TABLET ORAL at 20:35

## 2024-03-30 RX ADMIN — BUDESONIDE AND FORMOTEROL FUMARATE DIHYDRATE 2 PUFF: 160; 4.5 AEROSOL RESPIRATORY (INHALATION) at 20:07

## 2024-03-30 RX ADMIN — GABAPENTIN 400 MG: 400 CAPSULE ORAL at 08:01

## 2024-03-30 RX ADMIN — HEPARIN SODIUM 5000 UNITS: 5000 INJECTION INTRAVENOUS; SUBCUTANEOUS at 20:34

## 2024-03-30 RX ADMIN — OXYCODONE HYDROCHLORIDE AND ACETAMINOPHEN 1000 MG: 500 TABLET ORAL at 20:34

## 2024-03-30 RX ADMIN — SODIUM CHLORIDE, PRESERVATIVE FREE 10 ML: 5 INJECTION INTRAVENOUS at 08:01

## 2024-03-30 RX ADMIN — ALLOPURINOL 200 MG: 100 TABLET ORAL at 08:00

## 2024-03-30 RX ADMIN — FLUTICASONE PROPIONATE 1 SPRAY: 50 SPRAY, METERED NASAL at 08:03

## 2024-03-30 RX ADMIN — ATORVASTATIN CALCIUM 40 MG: 40 TABLET, FILM COATED ORAL at 20:35

## 2024-03-30 RX ADMIN — TIOTROPIUM BROMIDE INHALATION SPRAY 2 PUFF: 3.12 SPRAY, METERED RESPIRATORY (INHALATION) at 07:39

## 2024-03-30 RX ADMIN — PANTOPRAZOLE SODIUM 40 MG: 40 TABLET, DELAYED RELEASE ORAL at 08:01

## 2024-03-30 RX ADMIN — OXYCODONE HYDROCHLORIDE AND ACETAMINOPHEN 1000 MG: 500 TABLET ORAL at 08:00

## 2024-03-30 RX ADMIN — OXYCODONE HYDROCHLORIDE 10 MG: 10 TABLET ORAL at 14:12

## 2024-03-30 RX ADMIN — Medication 400 UNITS: at 20:35

## 2024-03-30 RX ADMIN — INSULIN LISPRO 4 UNITS: 100 INJECTION, SOLUTION INTRAVENOUS; SUBCUTANEOUS at 16:55

## 2024-03-30 RX ADMIN — OXYCODONE HYDROCHLORIDE 10 MG: 10 TABLET ORAL at 08:01

## 2024-03-30 RX ADMIN — Medication 400 UNITS: at 08:01

## 2024-03-30 RX ADMIN — COLCHICINE 0.6 MG: 0.6 TABLET ORAL at 08:01

## 2024-03-30 RX ADMIN — Medication 1 CAPSULE: at 11:42

## 2024-03-30 RX ADMIN — OXYCODONE HYDROCHLORIDE 10 MG: 10 TABLET ORAL at 16:42

## 2024-03-30 RX ADMIN — INSULIN LISPRO 8 UNITS: 100 INJECTION, SOLUTION INTRAVENOUS; SUBCUTANEOUS at 11:42

## 2024-03-30 RX ADMIN — BUDESONIDE AND FORMOTEROL FUMARATE DIHYDRATE 2 PUFF: 160; 4.5 AEROSOL RESPIRATORY (INHALATION) at 07:39

## 2024-03-30 RX ADMIN — IPRATROPIUM BROMIDE 2 SPRAY: 21 SPRAY NASAL at 20:41

## 2024-03-30 RX ADMIN — FLUTICASONE PROPIONATE 1 SPRAY: 50 SPRAY, METERED NASAL at 20:41

## 2024-03-30 RX ADMIN — SODIUM CHLORIDE: 9 INJECTION, SOLUTION INTRAVENOUS at 05:38

## 2024-03-30 RX ADMIN — ASPIRIN 81 MG 81 MG: 81 TABLET ORAL at 08:01

## 2024-03-30 RX ADMIN — INSULIN GLARGINE 34 UNITS: 100 INJECTION, SOLUTION SUBCUTANEOUS at 20:34

## 2024-03-30 ASSESSMENT — PAIN DESCRIPTION - LOCATION
LOCATION: KNEE;BACK;LEG
LOCATION: GENERALIZED

## 2024-03-30 ASSESSMENT — PAIN SCALES - GENERAL
PAINLEVEL_OUTOF10: 10
PAINLEVEL_OUTOF10: 9
PAINLEVEL_OUTOF10: 9
PAINLEVEL_OUTOF10: 7

## 2024-03-30 ASSESSMENT — PAIN - FUNCTIONAL ASSESSMENT: PAIN_FUNCTIONAL_ASSESSMENT: PREVENTS OR INTERFERES SOME ACTIVE ACTIVITIES AND ADLS

## 2024-03-30 ASSESSMENT — PAIN DESCRIPTION - DESCRIPTORS: DESCRIPTORS: ACHING

## 2024-03-30 NOTE — PLAN OF CARE
Problem: Discharge Planning  Goal: Discharge to home or other facility with appropriate resources  3/30/2024 0103 by Dee Dee Prakash RN  Outcome: Progressing  Flowsheets (Taken 3/29/2024 2045)  Discharge to home or other facility with appropriate resources: Identify barriers to discharge with patient and caregiver  3/29/2024 1124 by Dave Cordon RN  Outcome: Progressing     Problem: Pain  Goal: Verbalizes/displays adequate comfort level or baseline comfort level  3/30/2024 0103 by Dee Dee Praksah RN  Outcome: Progressing  3/29/2024 1124 by Dave Cordon RN  Outcome: Progressing     Problem: Chronic Conditions and Co-morbidities  Goal: Patient's chronic conditions and co-morbidity symptoms are monitored and maintained or improved  3/30/2024 0103 by Dee Dee Prakash RN  Outcome: Progressing  Flowsheets (Taken 3/29/2024 2045)  Care Plan - Patient's Chronic Conditions and Co-Morbidity Symptoms are Monitored and Maintained or Improved: Monitor and assess patient's chronic conditions and comorbid symptoms for stability, deterioration, or improvement  3/29/2024 1124 by Dave Cordon RN  Outcome: Progressing     Problem: Safety - Adult  Goal: Free from fall injury  3/30/2024 0103 by Dee Dee Prakash RN  Outcome: Progressing  Flowsheets (Taken 3/29/2024 2047)  Free From Fall Injury: Instruct family/caregiver on patient safety  3/29/2024 1124 by Dave Cordon RN  Outcome: Progressing     Problem: Skin/Tissue Integrity  Goal: Absence of new skin breakdown  Description: 1.  Monitor for areas of redness and/or skin breakdown  2.  Assess vascular access sites hourly  3.  Every 4-6 hours minimum:  Change oxygen saturation probe site  4.  Every 4-6 hours:  If on nasal continuous positive airway pressure, respiratory therapy assess nares and determine need for appliance change or resting period.  3/30/2024 0103 by Dee Dee Prakash RN  Outcome: Progressing  3/29/2024 1124 by Dave Cordon RN  Outcome:  [place] in 7 days.      3/29/2024 1405 by Lorena Alejandra, PT  Outcome: Progressing

## 2024-03-30 NOTE — PROGRESS NOTES
Nephrology follow up          Patient: Shantanu Casillas MRN: [id number]  SSN: [id number]    YOB: 1955  Age: 68 y.o.  Sex: male      Subjective:   Pt is seen at the bedside  BUN/Cr 85/1.75  Bps are ok  Good uop    Past Medical History:   Diagnosis Date    Arthritis     CAD (coronary artery disease)     Chronic obstructive pulmonary disease (HCC)     Diabetes (HCC)     Gout     Hypertension     Ill-defined condition     Sleep apnea     cpap     Past Surgical History:   Procedure Laterality Date    FOOT DEBRIDEMENT Right 12/15/2023    INCISION AND DRAINAGE RIGHT FIRST METATARSAL PHALANGEAL JOINT performed by Polo Oviedo DPM at [facility] MAIN OR    FOOT SURGERY      right heel - foriegn object    HX VEIN ABLATION ADHESIVE      ORTHOPEDIC SURGERY      back surgery    PACEMAKER      aicd -      Family History   Problem Relation Age of Onset    Diabetes Mother     Heart Disease Father     Hypertension Father     Diabetes Sister     Diabetes Brother     Hypertension Mother     Heart Disease Mother     Kidney Disease Mother      Social History     Tobacco Use    Smoking status: Never    Smokeless tobacco: Never   Substance Use Topics    Alcohol use: Not Currently      Current Facility-Administered Medications   Medication Dose Route Frequency Provider Last Rate Last Admin    oxyCODONE HCl (OXY-IR) immediate release tablet 10 mg  10 mg Oral 4x Daily Ed Pino MD   10 mg at 03/29/24 2141    pantoprazole (PROTONIX) tablet 40 mg  40 mg Oral QAM AC Ed Pino MD   40 mg at 03/29/24 0556    gabapentin (NEURONTIN) capsule 400 mg  400 mg Oral Daily Bhupendra Hayden MD   400 mg at 03/29/24 1023    albuterol sulfate HFA (PROVENTIL;VENTOLIN;PROAIR) 108 (90 Base) MCG/ACT inhaler 1 puff  1 puff Inhalation Q4H PRN Ed Pino MD        allopurinol (ZYLOPRIM) tablet 200 mg  200 mg Oral Daily Ed Pino MD   200 mg at 03/29/24 1023    ammonium lactate (AMLACTIN) 12 % cream   Topical TID PRN  (!) 108 (!) 104    Resp: 16   20   Temp:  97.3 °F (36.3 °C)     TempSrc:  Oral     SpO2:  100%     Weight:       Height:            Physical Exam:  General: NAD  Eyes: sclera anicteric  Oral Cavity: No thrush or ulcers  Neck: no JVD  Chest: Fair bilateral air entry  Heart: normal sounds  Abdomen: soft and non tender   : no juares  Lower Extremities: no edema  Skin: no rash  Neuro: intact  Psychiatric: non-depressed          Assessment/Plan:   Acute kidney injury  Prerenal azotemia from hypotension/volume depletion plus on Entresto and Bumex  On admission BUN/creatinine 148/2.7  BUN/creatinine improved to 85/1.75 today in response to IV fluids  Baseline creatinine 1.2 on 2/19/2024  UA bland apart from possible UTI  Renal ultrasound no hydronephrosis  Held Entresto and Bumex  IV fluids    Hyponatremia  Acute on chronic  Of moderate severity  From volume depletion  Sodium 129-->138  isotonic IV fluids    Hypokalemia  K is 3.3-->3.5  P.o. KCl replacement    Metabolic alkalosis  Acute on chronic  From volume contraction plus renal compensation in the setting of chronic respiratory acidosis  IV fluids    History of CHF  LVEF 40 to 45%  Compensated  Okay to hold Bumex and Entresto due to DEMARCO  Gentle IV fluids    Neuropathy  On gabapentin 400 mg 3 times daily  decreased dose 400 mg daily                  Plan:       Signed By: Bhupendra Hayden MD     March 29, 2024

## 2024-03-30 NOTE — PROGRESS NOTES
Nephrology follow up          Patient: Shantanu Casillas MRN: [id number]  SSN: [id number]    YOB: 1955  Age: 68 y.o.  Sex: male      Subjective:   Pt is seen at the bedside  BUN/Cr 61/1.41  Bps are ok  Good uop    Past Medical History:   Diagnosis Date    Arthritis     CAD (coronary artery disease)     Chronic obstructive pulmonary disease (HCC)     Diabetes (HCC)     Gout     Hypertension     Ill-defined condition     Sleep apnea     cpap     Past Surgical History:   Procedure Laterality Date    FOOT DEBRIDEMENT Right 12/15/2023    INCISION AND DRAINAGE RIGHT FIRST METATARSAL PHALANGEAL JOINT performed by Polo Oviedo DPM at [facility] MAIN OR    FOOT SURGERY      right heel - foriegn object    HX VEIN ABLATION ADHESIVE      ORTHOPEDIC SURGERY      back surgery    PACEMAKER      aicd -      Family History   Problem Relation Age of Onset    Diabetes Mother     Heart Disease Father     Hypertension Father     Diabetes Sister     Diabetes Brother     Hypertension Mother     Heart Disease Mother     Kidney Disease Mother      Social History     Tobacco Use    Smoking status: Never    Smokeless tobacco: Never   Substance Use Topics    Alcohol use: Not Currently      Current Facility-Administered Medications   Medication Dose Route Frequency Provider Last Rate Last Admin    ipratropium (ATROVENT) 0.03 % nasal spray 2 spray  2 spray Each Nostril TID Orion Hameed MD        oxyCODONE HCl (OXY-IR) immediate release tablet 10 mg  10 mg Oral 4x Daily Ed Pino MD   10 mg at 03/30/24 1412    pantoprazole (PROTONIX) tablet 40 mg  40 mg Oral QAM AC Ed Pino MD   40 mg at 03/30/24 0801    gabapentin (NEURONTIN) capsule 400 mg  400 mg Oral Daily Bhupendra Hayden MD   400 mg at 03/30/24 0801    albuterol sulfate HFA (PROVENTIL;VENTOLIN;PROAIR) 108 (90 Base) MCG/ACT inhaler 1 puff  1 puff Inhalation Q4H PRN Ed Pino MD        allopurinol (ZYLOPRIM) tablet 200 mg  200 mg Oral Daily  injection 5-40 mL  5-40 mL IntraVENous 2 times per day Ed Pino MD   10 mL at 03/30/24 0801    sodium chloride flush 0.9 % injection 5-40 mL  5-40 mL IntraVENous PRN Ed Pino MD        0.9 % sodium chloride infusion   IntraVENous PRN Ed Pino MD        potassium chloride (KLOR-CON M) extended release tablet 40 mEq  40 mEq Oral PRN Ed Pino MD        Or    potassium bicarb-citric acid (EFFER-K) effervescent tablet 40 mEq  40 mEq Oral PRN Ed Pino MD        Or    potassium chloride 10 mEq/100 mL IVPB (Peripheral Line)  10 mEq IntraVENous PRN Ed Pino MD        magnesium sulfate 2000 mg in 50 mL IVPB premix  2,000 mg IntraVENous PRN Ed Pino MD        ondansetron (ZOFRAN-ODT) disintegrating tablet 4 mg  4 mg Oral Q8H PRN Ed Pino MD        Or    ondansetron (ZOFRAN) injection 4 mg  4 mg IntraVENous Q6H PRN Ed Pino MD        polyethylene glycol (GLYCOLAX) packet 17 g  17 g Oral Daily PRN Ed Pino MD        acetaminophen (TYLENOL) tablet 650 mg  650 mg Oral Q6H PRN Ed Pino MD        Or    acetaminophen (TYLENOL) suppository 650 mg  650 mg Rectal Q6H PRN Ed Pino MD        heparin (porcine) injection 5,000 Units  5,000 Units SubCUTAneous 3 times per day Ed Pino MD   5,000 Units at 03/30/24 1411    tiotropium (SPIRIVA RESPIMAT) 2.5 MCG/ACT inhaler 2 puff  2 puff Inhalation Daily Ed Pino MD   2 puff at 03/30/24 0739    budesonide-formoterol (SYMBICORT) 160-4.5 MCG/ACT inhaler 2 puff  2 puff Inhalation BID RT Ed Pino MD   2 puff at 03/30/24 0739        Allergies   Allergen Reactions    Amitriptyline Angioedema    Naproxen      Other reaction(s): Unknown (comments)  Pt not sure of reaction    Sulfa Antibiotics Nausea And Vomiting       Review of Systems:  A comprehensive review of systems was negative except for that written in the History of Present Illness.    Objective:     Vitals:     03/30/24 0811 03/30/24 0831 03/30/24 1412 03/30/24 1440   BP: 118/71   107/61   Pulse: 99   (!) 105   Resp: 18 18 15 19   Temp: 97 °F (36.1 °C)   97.7 °F (36.5 °C)   TempSrc: Oral   Oral   SpO2: 97%   100%   Weight:       Height:            Physical Exam:  General: NAD  Eyes: sclera anicteric  Oral Cavity: No thrush or ulcers  Neck: no JVD  Chest: Fair bilateral air entry  Heart: normal sounds  Abdomen: soft and non tender   : no juares  Lower Extremities: no edema  Skin: no rash  Neuro: intact  Psychiatric: non-depressed          Assessment/Plan:   Acute kidney injury  Prerenal azotemia from hypotension/volume depletion plus on Entresto and Bumex  On admission BUN/creatinine 148/2.7  BUN/creatinine improved to 61/1.4 today in response to IV fluids  Baseline creatinine 1.2 on 2/19/2024  UA bland apart from possible UTI  Renal ultrasound no hydronephrosis  Held Entresto and Bumex  Decrease IV fluids    Hyponatremia  Acute on chronic  Of moderate severity  From volume depletion  Resolved  isotonic IV fluids    Hypokalemia  K is 3.3-->3.5  P.o. KCl replacement    Metabolic alkalosis  Acute on chronic  From volume contraction plus renal compensation in the setting of chronic respiratory acidosis  IV fluids    History of CHF  LVEF 40 to 45%  Compensated  Okay to hold Bumex and Entresto due to DEMARCO  Gentle IV fluids    Neuropathy  On gabapentin 400 mg 3 times daily  decreased dose 400 mg daily                  Plan:       Signed By: Bhupendra Hayden MD     March 30, 2024

## 2024-03-30 NOTE — CONSULTS
PULMONARY CONSULT  [facility]    Name: Shantanu Casillas MRN: [id number]   : 1955 Hospital: [facility]   Date: 3/30/2024  Admission date: 3/27/2024 Hospital Day: 4       HPI:     Patient Active Problem List   Diagnosis    Onychomycosis    Non-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 (HCC)    Idiopathic chronic venous hypertension of left lower extremity with ulcer (HCC)    Ulcer of left foot, with fat layer exposed (HCC)    Cellulitis    COVID-19    Venous ulcer (HCC)    Hyperkeratosis    Localized edema    Type 2 diabetes mellitus with diabetic polyneuropathy, with long-term current use of insulin (HCC)    Cellulitis and abscess of right leg    Chronic ulcer of right heel limited to breakdown of skin (HCC)    Calf ulcer, left, with fat layer exposed (HCC)    Ankle ulcer, right, with fat layer exposed (HCC)    Non-pressure chronic ulcer of other part of right lower leg with fat layer exposed (HCC)    DEMARCO (acute kidney injury) (HCC)    Back pain    Acute on chronic systolic CHF (congestive heart failure), NYHA class 2 (HCC)    Shortness of breath    Acute on chronic congestive heart failure (HCC)    Wound infection    Venous stasis ulcer (HCC)    Open wound of right foot    Atherosclerotic heart disease of native coronary artery without angina pectoris    Chronic obstructive pulmonary disease, unspecified (HCC)    Chronic pain disorder    Dependence on supplemental oxygen    Difficulty in walking, not elsewhere classified    Gout, unspecified    Hypertensive heart disease with heart failure (HCC)    Morbid (severe) obesity due to excess calories (HCC)    Muscle weakness (generalized)    Obstructive sleep apnea (adult) (pediatric)    Other lack of coordination    Other symptoms and signs involving the musculoskeletal system    Personal history of COVID-19    Presence of cardiac pacemaker    Type 2 diabetes mellitus with diabetic peripheral angiopathy  glucagon injection 1 mg  1 mg SubCUTAneous PRN Ed Pino MD        dextrose 10 % infusion   IntraVENous Continuous PRN Ed Pino MD        0.9 % sodium chloride infusion   IntraVENous Continuous Bhupendra Hayden MD   Stopped at 03/30/24 1150    sodium chloride flush 0.9 % injection 5-40 mL  5-40 mL IntraVENous 2 times per day Ed Pino MD   10 mL at 03/30/24 0801    sodium chloride flush 0.9 % injection 5-40 mL  5-40 mL IntraVENous PRN Ed Pino MD        0.9 % sodium chloride infusion   IntraVENous PRN Ed Pino MD        potassium chloride (KLOR-CON M) extended release tablet 40 mEq  40 mEq Oral PRN Ed Pino MD        Or    potassium bicarb-citric acid (EFFER-K) effervescent tablet 40 mEq  40 mEq Oral PRN Ed Pino MD        Or    potassium chloride 10 mEq/100 mL IVPB (Peripheral Line)  10 mEq IntraVENous PRN Ed Pino MD        magnesium sulfate 2000 mg in 50 mL IVPB premix  2,000 mg IntraVENous PRN Ed Pino MD        ondansetron (ZOFRAN-ODT) disintegrating tablet 4 mg  4 mg Oral Q8H PRN Ed Pino MD        Or    ondansetron (ZOFRAN) injection 4 mg  4 mg IntraVENous Q6H PRN Ed Pino MD        polyethylene glycol (GLYCOLAX) packet 17 g  17 g Oral Daily PRN Ed Pino MD        acetaminophen (TYLENOL) tablet 650 mg  650 mg Oral Q6H PRN Ed Pino MD        Or    acetaminophen (TYLENOL) suppository 650 mg  650 mg Rectal Q6H PRN Ed Pino MD        heparin (porcine) injection 5,000 Units  5,000 Units SubCUTAneous 3 times per day Ed Pino MD   5,000 Units at 03/30/24 0533    tiotropium (SPIRIVA RESPIMAT) 2.5 MCG/ACT inhaler 2 puff  2 puff Inhalation Daily Ed Pino MD   2 puff at 03/30/24 0739    budesonide-formoterol (SYMBICORT) 160-4.5 MCG/ACT inhaler 2 puff  2 puff Inhalation BID RT Ed Pino MD   2 puff at 03/30/24 5310      Patient PCP: Ed Pino MD  PMH:  has a past  03/27/24 1752    Order Status: Completed Specimen: Urine Updated: 03/28/24 3774     Special Requests --        No Special Requests  Reflexed from [id number]       [place] count 12,000        [place] count colonies/ml        Culture       Mixed urogenital ady isolated                   Imaging:  US RETROPERITONEAL COMPLETE    Result Date: 3/27/2024  EXAM: US RETROPERITONEAL COMPLETE ACC#: [id number] INDICATION: DEMARCO, eval for obstructive uropathy, hydro, medical renal disease COMPARISON: None. TECHNIQUE:  Sonographic evaluation of the kidneys and bladder was performed. FINDINGS: Right kidney: 9.7 cm long. The kidney is reniform. There is no hydronephrosis. Renal parenchyma is echogenic. Left kidney: 11.2 cm long. The kidney is echogenic. It is reniform with no hydronephrosis. There is an echogenic focus in the lower portion of the kidney which may represent a stone. Bladder: No stones. No significant bladder wall thickening. Retroperitoneum: The aorta and inferior vena cava not visualized due to body habitus.     Echogenic kidneys may be secondary to medical renal disease. Possible stone in the left kidney. There is no hydronephrosis.        ARTERIAL BLOOD GAS      IMPRESSION:   Acute on chronic respiratory failure with Hypoxia currently on his baseline 3 L nasal oxygen  Acute kidney injury  Cardiomyopathy 6/2023 ejection fraction 40%  Sinus congestion questionably combination allergic rhinitis and vasomotor rhinitis  CHF with Diastolic dysfunction  Right venous stasis of lower extremities  G47.33 Obstructive sleep apnea (adult) (pediatric)  J44.9 Chronic obstructive pulmonary disease, unspecified  Gouty arthritis  Z86.16 Personal history of COVID-19  Pt is requiring Drug therapy requiring intensive monitoring for toxicity  Pt is unstable, unpredictable needing inpatient monitoring; is acutely ill and at high risk of sudden decline and decompensation with severe consequenses and continued end organ dysfunction and

## 2024-03-30 NOTE — PROGRESS NOTES
History and Physical    NAME:   Shantanu Casillas   :  1955   MRN:  [id number]     Date/Time: 3/30/2024 3:00 PM    Patient PCP: Ed Pino MD  ______________________________________________________________________       Subjective:     CHIEF COMPLAINT:     Abnormal labs        HISTORY OF PRESENT ILLNESS:     General Daily Progress Note  Patient is a 68 y.o. year old male past medical history of COPD type 2 diabetes hypertension gout COPD CHF ejection fraction 4045% venous stasis ulcers chronic kidney disease obstructive sleep apnea who recently discharged from the hospital more than a month ago with community-acquired pneumonia at the nursing facility blood work done shows today acute kidney injury seen by the ER physician recommended patient to be admitted for further evaluation treatment patient have ultrasound of the kidneys done which is pending    3/28  Patient alert awake denies any chest pain or shortness of breath or nausea no vomiting feeling better    3/29    Patient alert in no distress complaining of cough congestion  3/30  Patient is resting in bed comfortably as oxygen in place admitted for acute kidney injury nephrology on the case  Past Medical History:   Diagnosis Date    Arthritis     CAD (coronary artery disease)     Chronic obstructive pulmonary disease (HCC)     Diabetes (HCC)     Gout     Hypertension     Ill-defined condition     Sleep apnea     cpap        Past Surgical History:   Procedure Laterality Date    FOOT DEBRIDEMENT Right 12/15/2023    INCISION AND DRAINAGE RIGHT FIRST METATARSAL PHALANGEAL JOINT performed by Polo Oviedo DPM at [facility] MAIN OR    FOOT SURGERY      right heel - foriegn object    HX VEIN ABLATION ADHESIVE      ORTHOPEDIC SURGERY      back surgery    PACEMAKER      aicd -       Social History     Tobacco Use    Smoking status: Never    Smokeless tobacco: Never   Substance Use Topics    Alcohol use: Not Currently        Family History   Problem Relation Age

## 2024-03-31 LAB
ALBUMIN SERPL-MCNC: 2.9 G/DL (ref 3.5–5)
ANION GAP SERPL CALC-SCNC: 5 MMOL/L (ref 5–15)
BUN SERPL-MCNC: 46 MG/DL (ref 6–20)
BUN/CREAT SERPL: 28 (ref 12–20)
CA-I BLD-MCNC: 9.1 MG/DL (ref 8.5–10.1)
CHLORIDE SERPL-SCNC: 103 MMOL/L (ref 97–108)
CO2 SERPL-SCNC: 31 MMOL/L (ref 21–32)
CREAT SERPL-MCNC: 1.66 MG/DL (ref 0.7–1.3)
GLUCOSE BLD STRIP.AUTO-MCNC: 139 MG/DL (ref 65–100)
GLUCOSE BLD STRIP.AUTO-MCNC: 184 MG/DL (ref 65–100)
GLUCOSE BLD STRIP.AUTO-MCNC: 205 MG/DL (ref 65–100)
GLUCOSE BLD STRIP.AUTO-MCNC: 212 MG/DL (ref 65–100)
GLUCOSE SERPL-MCNC: 182 MG/DL (ref 65–100)
PERFORMED BY:: ABNORMAL
PHOSPHATE SERPL-MCNC: 2.4 MG/DL (ref 2.6–4.7)
POTASSIUM SERPL-SCNC: 3.6 MMOL/L (ref 3.5–5.1)
SODIUM SERPL-SCNC: 139 MMOL/L (ref 136–145)

## 2024-03-31 PROCEDURE — 2700000000 HC OXYGEN THERAPY PER DAY

## 2024-03-31 PROCEDURE — 2500000003 HC RX 250 WO HCPCS: Performed by: FAMILY MEDICINE

## 2024-03-31 PROCEDURE — 6370000000 HC RX 637 (ALT 250 FOR IP): Performed by: INTERNAL MEDICINE

## 2024-03-31 PROCEDURE — 1100000000 HC RM PRIVATE

## 2024-03-31 PROCEDURE — 89220 SPUTUM SPECIMEN COLLECTION: CPT

## 2024-03-31 PROCEDURE — 6360000002 HC RX W HCPCS: Performed by: FAMILY MEDICINE

## 2024-03-31 PROCEDURE — 6370000000 HC RX 637 (ALT 250 FOR IP): Performed by: FAMILY MEDICINE

## 2024-03-31 PROCEDURE — 80069 RENAL FUNCTION PANEL: CPT

## 2024-03-31 PROCEDURE — 36415 COLL VENOUS BLD VENIPUNCTURE: CPT

## 2024-03-31 PROCEDURE — 94640 AIRWAY INHALATION TREATMENT: CPT

## 2024-03-31 PROCEDURE — 82962 GLUCOSE BLOOD TEST: CPT

## 2024-03-31 PROCEDURE — 94761 N-INVAS EAR/PLS OXIMETRY MLT: CPT

## 2024-03-31 PROCEDURE — 2580000003 HC RX 258: Performed by: FAMILY MEDICINE

## 2024-03-31 RX ORDER — IPRATROPIUM BROMIDE AND ALBUTEROL SULFATE 2.5; .5 MG/3ML; MG/3ML
1 SOLUTION RESPIRATORY (INHALATION) ONCE
Status: COMPLETED | OUTPATIENT
Start: 2024-03-31 | End: 2024-03-31

## 2024-03-31 RX ORDER — FUROSEMIDE 40 MG/1
40 TABLET ORAL DAILY
Status: DISCONTINUED | OUTPATIENT
Start: 2024-03-31 | End: 2024-03-31

## 2024-03-31 RX ORDER — GABAPENTIN 400 MG/1
400 CAPSULE ORAL 3 TIMES DAILY
Status: DISCONTINUED | OUTPATIENT
Start: 2024-03-31 | End: 2024-04-02 | Stop reason: HOSPADM

## 2024-03-31 RX ORDER — FUROSEMIDE 40 MG/1
40 TABLET ORAL ONCE
Status: DISCONTINUED | OUTPATIENT
Start: 2024-03-31 | End: 2024-04-02 | Stop reason: HOSPADM

## 2024-03-31 RX ORDER — FUROSEMIDE 40 MG/1
40 TABLET ORAL ONCE
Status: COMPLETED | OUTPATIENT
Start: 2024-03-31 | End: 2024-03-31

## 2024-03-31 RX ORDER — FUROSEMIDE 10 MG/ML
40 INJECTION INTRAMUSCULAR; INTRAVENOUS ONCE
Status: DISCONTINUED | OUTPATIENT
Start: 2024-03-31 | End: 2024-03-31

## 2024-03-31 RX ORDER — IPRATROPIUM BROMIDE AND ALBUTEROL SULFATE 2.5; .5 MG/3ML; MG/3ML
1 SOLUTION RESPIRATORY (INHALATION)
Status: DISCONTINUED | OUTPATIENT
Start: 2024-03-31 | End: 2024-03-31

## 2024-03-31 RX ADMIN — FLUTICASONE PROPIONATE 1 SPRAY: 50 SPRAY, METERED NASAL at 08:26

## 2024-03-31 RX ADMIN — TIOTROPIUM BROMIDE INHALATION SPRAY 2 PUFF: 3.12 SPRAY, METERED RESPIRATORY (INHALATION) at 08:59

## 2024-03-31 RX ADMIN — OXYCODONE HYDROCHLORIDE 10 MG: 10 TABLET ORAL at 16:49

## 2024-03-31 RX ADMIN — BUDESONIDE AND FORMOTEROL FUMARATE DIHYDRATE 2 PUFF: 160; 4.5 AEROSOL RESPIRATORY (INHALATION) at 20:53

## 2024-03-31 RX ADMIN — FUROSEMIDE 40 MG: 40 TABLET ORAL at 08:23

## 2024-03-31 RX ADMIN — IPRATROPIUM BROMIDE 2 SPRAY: 21 SPRAY NASAL at 08:26

## 2024-03-31 RX ADMIN — ACETAMINOPHEN 650 MG: 325 TABLET ORAL at 00:29

## 2024-03-31 RX ADMIN — OXYCODONE HYDROCHLORIDE AND ACETAMINOPHEN 1000 MG: 500 TABLET ORAL at 08:24

## 2024-03-31 RX ADMIN — IPRATROPIUM BROMIDE AND ALBUTEROL SULFATE 1 DOSE: .5; 2.5 SOLUTION RESPIRATORY (INHALATION) at 11:34

## 2024-03-31 RX ADMIN — ONDANSETRON 4 MG: 4 TABLET, ORALLY DISINTEGRATING ORAL at 08:15

## 2024-03-31 RX ADMIN — ATORVASTATIN CALCIUM 40 MG: 40 TABLET, FILM COATED ORAL at 21:15

## 2024-03-31 RX ADMIN — ASPIRIN 81 MG 81 MG: 81 TABLET ORAL at 08:24

## 2024-03-31 RX ADMIN — INSULIN LISPRO 4 UNITS: 100 INJECTION, SOLUTION INTRAVENOUS; SUBCUTANEOUS at 11:14

## 2024-03-31 RX ADMIN — FLUTICASONE PROPIONATE 1 SPRAY: 50 SPRAY, METERED NASAL at 21:22

## 2024-03-31 RX ADMIN — OXYCODONE HYDROCHLORIDE 10 MG: 10 TABLET ORAL at 08:24

## 2024-03-31 RX ADMIN — IPRATROPIUM BROMIDE 2 SPRAY: 21 SPRAY NASAL at 16:47

## 2024-03-31 RX ADMIN — COLCHICINE 0.6 MG: 0.6 TABLET ORAL at 08:24

## 2024-03-31 RX ADMIN — HEPARIN SODIUM 5000 UNITS: 5000 INJECTION INTRAVENOUS; SUBCUTANEOUS at 21:15

## 2024-03-31 RX ADMIN — BUDESONIDE AND FORMOTEROL FUMARATE DIHYDRATE 2 PUFF: 160; 4.5 AEROSOL RESPIRATORY (INHALATION) at 08:59

## 2024-03-31 RX ADMIN — FUROSEMIDE 40 MG: 40 TABLET ORAL at 11:10

## 2024-03-31 RX ADMIN — INSULIN LISPRO 4 UNITS: 100 INJECTION, SOLUTION INTRAVENOUS; SUBCUTANEOUS at 16:48

## 2024-03-31 RX ADMIN — HEPARIN SODIUM 5000 UNITS: 5000 INJECTION INTRAVENOUS; SUBCUTANEOUS at 05:32

## 2024-03-31 RX ADMIN — ALLOPURINOL 200 MG: 100 TABLET ORAL at 08:24

## 2024-03-31 RX ADMIN — GABAPENTIN 400 MG: 400 CAPSULE ORAL at 13:55

## 2024-03-31 RX ADMIN — OXYCODONE HYDROCHLORIDE 10 MG: 10 TABLET ORAL at 13:55

## 2024-03-31 RX ADMIN — IPRATROPIUM BROMIDE 2 SPRAY: 21 SPRAY NASAL at 21:22

## 2024-03-31 RX ADMIN — Medication 1 CAPSULE: at 08:24

## 2024-03-31 RX ADMIN — GABAPENTIN 400 MG: 400 CAPSULE ORAL at 21:14

## 2024-03-31 RX ADMIN — OXYCODONE HYDROCHLORIDE 10 MG: 10 TABLET ORAL at 21:13

## 2024-03-31 RX ADMIN — GABAPENTIN 400 MG: 400 CAPSULE ORAL at 08:24

## 2024-03-31 RX ADMIN — Medication 400 UNITS: at 21:14

## 2024-03-31 RX ADMIN — INSULIN GLARGINE 34 UNITS: 100 INJECTION, SOLUTION SUBCUTANEOUS at 21:26

## 2024-03-31 RX ADMIN — OXYCODONE HYDROCHLORIDE AND ACETAMINOPHEN 1000 MG: 500 TABLET ORAL at 21:14

## 2024-03-31 RX ADMIN — HEPARIN SODIUM 5000 UNITS: 5000 INJECTION INTRAVENOUS; SUBCUTANEOUS at 13:55

## 2024-03-31 RX ADMIN — SODIUM CHLORIDE, PRESERVATIVE FREE 10 ML: 5 INJECTION INTRAVENOUS at 21:26

## 2024-03-31 ASSESSMENT — PAIN DESCRIPTION - DESCRIPTORS
DESCRIPTORS: ACHING

## 2024-03-31 ASSESSMENT — PAIN SCALES - GENERAL
PAINLEVEL_OUTOF10: 8
PAINLEVEL_OUTOF10: 10
PAINLEVEL_OUTOF10: 8
PAINLEVEL_OUTOF10: 3
PAINLEVEL_OUTOF10: 0
PAINLEVEL_OUTOF10: 8

## 2024-03-31 ASSESSMENT — PAIN DESCRIPTION - LOCATION
LOCATION: BACK
LOCATION: GENERALIZED
LOCATION: BACK
LOCATION: BACK;LEG;KNEE
LOCATION: GENERALIZED

## 2024-03-31 ASSESSMENT — PAIN - FUNCTIONAL ASSESSMENT: PAIN_FUNCTIONAL_ASSESSMENT: PREVENTS OR INTERFERES SOME ACTIVE ACTIVITIES AND ADLS

## 2024-03-31 NOTE — CONSULTS
PULMONARY CONSULT  [facility]    Name: Shantanu Casillas MRN: [id number]   : 1955 Hospital: [facility]   Date: 3/31/2024  Admission date: 3/27/2024 Hospital Day: 5       HPI:     Patient Active Problem List   Diagnosis    Onychomycosis    Non-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 (HCC)    Idiopathic chronic venous hypertension of left lower extremity with ulcer (HCC)    Ulcer of left foot, with fat layer exposed (HCC)    Cellulitis    COVID-19    Venous ulcer (HCC)    Hyperkeratosis    Localized edema    Type 2 diabetes mellitus with diabetic polyneuropathy, with long-term current use of insulin (HCC)    Cellulitis and abscess of right leg    Chronic ulcer of right heel limited to breakdown of skin (HCC)    Calf ulcer, left, with fat layer exposed (HCC)    Ankle ulcer, right, with fat layer exposed (HCC)    Non-pressure chronic ulcer of other part of right lower leg with fat layer exposed (HCC)    DEMARCO (acute kidney injury) (HCC)    Back pain    Acute on chronic systolic CHF (congestive heart failure), NYHA class 2 (HCC)    Shortness of breath    Acute on chronic congestive heart failure (HCC)    Wound infection    Venous stasis ulcer (HCC)    Open wound of right foot    Atherosclerotic heart disease of native coronary artery without angina pectoris    Chronic obstructive pulmonary disease, unspecified (HCC)    Chronic pain disorder    Dependence on supplemental oxygen    Difficulty in walking, not elsewhere classified    Gout, unspecified    Hypertensive heart disease with heart failure (HCC)    Morbid (severe) obesity due to excess calories (HCC)    Muscle weakness (generalized)    Obstructive sleep apnea (adult) (pediatric)    Other lack of coordination    Other symptoms and signs involving the musculoskeletal system    Personal history of COVID-19    Presence of cardiac pacemaker    Type 2 diabetes mellitus with diabetic peripheral angiopathy  Order Status: Completed Specimen: Urine Updated: 03/28/24 1847     Special Requests --        No Special Requests  Reflexed from [id number]       [place] count 12,000        [place] count colonies/ml        Culture       Mixed urogenital ady isolated                   Imaging:  US RETROPERITONEAL COMPLETE    Result Date: 3/27/2024  EXAM: US RETROPERITONEAL COMPLETE ACC#: [id number] INDICATION: DEMARCO, eval for obstructive uropathy, hydro, medical renal disease COMPARISON: None. TECHNIQUE:  Sonographic evaluation of the kidneys and bladder was performed. FINDINGS: Right kidney: 9.7 cm long. The kidney is reniform. There is no hydronephrosis. Renal parenchyma is echogenic. Left kidney: 11.2 cm long. The kidney is echogenic. It is reniform with no hydronephrosis. There is an echogenic focus in the lower portion of the kidney which may represent a stone. Bladder: No stones. No significant bladder wall thickening. Retroperitoneum: The aorta and inferior vena cava not visualized due to body habitus.     Echogenic kidneys may be secondary to medical renal disease. Possible stone in the left kidney. There is no hydronephrosis.        ARTERIAL BLOOD GAS      IMPRESSION:   Acute on chronic respiratory failure with Hypoxia currently on his baseline 3 L nasal oxygen  Acute kidney injury  Cardiomyopathy 6/2023 ejection fraction 40%  Sinus congestion questionably combination allergic rhinitis and vasomotor rhinitis  CHF with Diastolic dysfunction  Right venous stasis of lower extremities  G47.33 Obstructive sleep apnea (adult) (pediatric)  J44.9 Chronic obstructive pulmonary disease, unspecified  Gouty arthritis  Z86.16 Personal history of COVID-19  Pt is requiring Drug therapy requiring intensive monitoring for toxicity  Pt is unstable, unpredictable needing inpatient monitoring; is acutely ill and at high risk of sudden decline and decompensation with severe consequenses and continued end organ dysfunction and failure  Prognosis

## 2024-03-31 NOTE — PROGRESS NOTES
Nephrology follow up          Patient: Shantanu Casillas MRN: [id number]  SSN: [id number]    YOB: 1955  Age: 68 y.o.  Sex: male      Subjective:   Pt is seen at the bedside  Resolving DEMARCO  Bps are ok  Good uop  Off IV fluids due to shortness of breath    Past Medical History:   Diagnosis Date    Arthritis     CAD (coronary artery disease)     Chronic obstructive pulmonary disease (HCC)     Diabetes (HCC)     Gout     Hypertension     Ill-defined condition     Sleep apnea     cpap     Past Surgical History:   Procedure Laterality Date    FOOT DEBRIDEMENT Right 12/15/2023    INCISION AND DRAINAGE RIGHT FIRST METATARSAL PHALANGEAL JOINT performed by Polo Oviedo DPM at [facility] MAIN OR    FOOT SURGERY      right heel - foriegn object    HX VEIN ABLATION ADHESIVE      ORTHOPEDIC SURGERY      back surgery    PACEMAKER      aicd -      Family History   Problem Relation Age of Onset    Diabetes Mother     Heart Disease Father     Hypertension Father     Diabetes Sister     Diabetes Brother     Hypertension Mother     Heart Disease Mother     Kidney Disease Mother      Social History     Tobacco Use    Smoking status: Never    Smokeless tobacco: Never   Substance Use Topics    Alcohol use: Not Currently      Current Facility-Administered Medications   Medication Dose Route Frequency Provider Last Rate Last Admin    furosemide (LASIX) tablet 40 mg  40 mg Oral Once Oliver Murrell MD        gabapentin (NEURONTIN) capsule 400 mg  400 mg Oral TID Bhupendra Hayden MD        ipratropium (ATROVENT) 0.03 % nasal spray 2 spray  2 spray Each Nostril TID Orion Hameed MD   2 spray at 03/31/24 0826    oxyCODONE HCl (OXY-IR) immediate release tablet 10 mg  10 mg Oral 4x Daily Ed Pino MD   10 mg at 03/31/24 0824    pantoprazole (PROTONIX) tablet 40 mg  40 mg Oral QAM AC Ed Pino MD   40 mg at 03/30/24 0801    albuterol sulfate HFA (PROVENTIL;VENTOLIN;PROAIR) 108 (90 Base) MCG/ACT inhaler 1 puff   % injection 5-40 mL  5-40 mL IntraVENous 2 times per day Ed Pino MD   10 mL at 03/30/24 0801    sodium chloride flush 0.9 % injection 5-40 mL  5-40 mL IntraVENous PRN Ed Pino MD        0.9 % sodium chloride infusion   IntraVENous PRN Ed Pino MD        potassium chloride (KLOR-CON M) extended release tablet 40 mEq  40 mEq Oral PRN Ed Pino MD        Or    potassium bicarb-citric acid (EFFER-K) effervescent tablet 40 mEq  40 mEq Oral PRN Ed Pino MD        Or    potassium chloride 10 mEq/100 mL IVPB (Peripheral Line)  10 mEq IntraVENous PRN Ed Pino MD        magnesium sulfate 2000 mg in 50 mL IVPB premix  2,000 mg IntraVENous PRN Ed Pino MD        ondansetron (ZOFRAN-ODT) disintegrating tablet 4 mg  4 mg Oral Q8H PRN Ed Pino MD   4 mg at 03/31/24 0815    Or    ondansetron (ZOFRAN) injection 4 mg  4 mg IntraVENous Q6H PRN Ed Pino MD        polyethylene glycol (GLYCOLAX) packet 17 g  17 g Oral Daily PRN Ed Pino MD        acetaminophen (TYLENOL) tablet 650 mg  650 mg Oral Q6H PRN Ed Pino MD   650 mg at 03/31/24 0029    Or    acetaminophen (TYLENOL) suppository 650 mg  650 mg Rectal Q6H PRN Ed Pino MD        heparin (porcine) injection 5,000 Units  5,000 Units SubCUTAneous 3 times per day Ed Pino MD   5,000 Units at 03/31/24 0532    budesonide-formoterol (SYMBICORT) 160-4.5 MCG/ACT inhaler 2 puff  2 puff Inhalation BID RT Ed Pino MD   2 puff at 03/31/24 0859        Allergies   Allergen Reactions    Amitriptyline Angioedema    Naproxen      Other reaction(s): Unknown (comments)  Pt not sure of reaction    Sulfa Antibiotics Nausea And Vomiting       Review of Systems:  A comprehensive review of systems was negative except for that written in the History of Present Illness.    Objective:     Vitals:    03/31/24 0553 03/31/24 0731 03/31/24 0854 03/31/24 0859   BP:  119/68     Pulse:  (!)

## 2024-03-31 NOTE — PROGRESS NOTES
History and Physical    NAME:   Shantanu Casillas   :  1955   MRN:  [id number]     Date/Time: 3/31/2024 10:41 AM    Patient PCP: Ed Pino MD  ______________________________________________________________________       Subjective:     CHIEF COMPLAINT:     Abnormal labs        HISTORY OF PRESENT ILLNESS:     General Daily Progress Note  Patient is a 68 y.o. year old male past medical history of COPD type 2 diabetes hypertension gout COPD CHF ejection fraction 4045% venous stasis ulcers chronic kidney disease obstructive sleep apnea who recently discharged from the hospital more than a month ago with community-acquired pneumonia at the nursing facility blood work done shows today acute kidney injury seen by the ER physician recommended patient to be admitted for further evaluation treatment patient have ultrasound of the kidneys done which is pending    3/28  Patient alert awake denies any chest pain or shortness of breath or nausea no vomiting feeling better    3/29    Patient alert in no distress complaining of cough congestion  3/30  Patient is resting in bed comfortably as oxygen in place admitted for acute kidney injury nephrology on the case  3/31    Part of gurgly respiration patient was on IV fluids as a history of CHF and COPD with possible obstructive sleep apnea history of chronic kidney disease admitted for acute kidney injury  Mild fluid overload/COPD exacerbation DC IV fluids IV Lasix ordered Place a PICC line or midline add DuoNebs  Past Medical History:   Diagnosis Date    Arthritis     CAD (coronary artery disease)     Chronic obstructive pulmonary disease (HCC)     Diabetes (HCC)     Gout     Hypertension     Ill-defined condition     Sleep apnea     cpap        Past Surgical History:   Procedure Laterality Date    FOOT DEBRIDEMENT Right 12/15/2023    INCISION AND DRAINAGE RIGHT FIRST METATARSAL PHALANGEAL JOINT performed by Polo Oviedo DPM at [facility] MAIN OR    FOOT SURGERY      right  200 mg, 200 mg, Oral, Daily, Ed Pino MD, 200 mg at 03/31/24 0824    ammonium lactate (AMLACTIN) 12 % cream, , Topical, TID PRN, Ed Pino MD    ascorbic acid (VITAMIN C) tablet 1,000 mg, 1,000 mg, Oral, BID, Ed Pino MD, 1,000 mg at 03/31/24 0824    aspirin chewable tablet 81 mg, 81 mg, Oral, Daily, Ed Pino MD, 81 mg at 03/31/24 0824    atorvastatin (LIPITOR) tablet 40 mg, 40 mg, Oral, Nightly, Ed Pino MD, 40 mg at 03/30/24 2035    vitamin B complex CAPS 1 capsule, 1 capsule, Oral, Daily, Ed Pino MD, 1 capsule at 03/31/24 0824    colchicine (COLCRYS) tablet 0.6 mg, 0.6 mg, Oral, Daily, Ed Pino MD, 0.6 mg at 03/31/24 0824    [Held by provider] sacubitril-valsartan (ENTRESTO) 24-26 MG per tablet 1 tablet, 1 tablet, Oral, BID, Ed Pino MD    fluticasone (FLONASE) 50 MCG/ACT nasal spray 1 spray, 1 spray, Each Nostril, BID, Ed Pino MD, 1 spray at 03/31/24 0826    insulin glargine (LANTUS) injection vial 34 Units, 34 Units, SubCUTAneous, Nightly, Ed Pino MD, 34 Units at 03/30/24 2034    vitamin E capsule 400 Units, 400 Units, Oral, BID, Ed Pino MD, 400 Units at 03/31/24 0827    insulin lispro (HUMALOG) injection vial 0-16 Units, 0-16 Units, SubCUTAneous, TID WC, Ed Pino MD, 4 Units at 03/30/24 1655    insulin lispro (HUMALOG) injection vial 0-4 Units, 0-4 Units, SubCUTAneous, Nightly, Ed Pino MD, 4 Units at 03/29/24 2141    glucose chewable tablet 16 g, 4 tablet, Oral, PRN, Ed Pino MD    dextrose bolus 10% 125 mL, 125 mL, IntraVENous, PRN **OR** dextrose bolus 10% 250 mL, 250 mL, IntraVENous, PRN, Ed Pino MD    glucagon injection 1 mg, 1 mg, SubCUTAneous, PRN, Ed Pino MD    dextrose 10 % infusion, , IntraVENous, Continuous PRN, Ed Pino MD    sodium chloride flush 0.9 % injection 5-40 mL, 5-40 mL, IntraVENous, 2 times per day, Ed Pino MD, 10 mL at  03/30/24 0801    sodium chloride flush 0.9 % injection 5-40 mL, 5-40 mL, IntraVENous, PRN, Ed Pino MD    0.9 % sodium chloride infusion, , IntraVENous, PRN, Ed Pino MD    potassium chloride (KLOR-CON M) extended release tablet 40 mEq, 40 mEq, Oral, PRN **OR** potassium bicarb-citric acid (EFFER-K) effervescent tablet 40 mEq, 40 mEq, Oral, PRN **OR** potassium chloride 10 mEq/100 mL IVPB (Peripheral Line), 10 mEq, IntraVENous, PRN, Ed Pino MD    magnesium sulfate 2000 mg in 50 mL IVPB premix, 2,000 mg, IntraVENous, PRN, Ed Pino MD    ondansetron (ZOFRAN-ODT) disintegrating tablet 4 mg, 4 mg, Oral, Q8H PRN, 4 mg at 03/31/24 0815 **OR** ondansetron (ZOFRAN) injection 4 mg, 4 mg, IntraVENous, Q6H PRN, Ed Pino MD    polyethylene glycol (GLYCOLAX) packet 17 g, 17 g, Oral, Daily PRN, Ed Pino MD    acetaminophen (TYLENOL) tablet 650 mg, 650 mg, Oral, Q6H PRN, 650 mg at 03/31/24 0029 **OR** acetaminophen (TYLENOL) suppository 650 mg, 650 mg, Rectal, Q6H PRN, Ed Pino MD    heparin (porcine) injection 5,000 Units, 5,000 Units, SubCUTAneous, 3 times per day, Ed Pino MD, 5,000 Units at 03/31/24 0532    tiotropium (SPIRIVA RESPIMAT) 2.5 MCG/ACT inhaler 2 puff, 2 puff, Inhalation, Daily, Ed Pino MD, 2 puff at 03/31/24 0859    budesonide-formoterol (SYMBICORT) 160-4.5 MCG/ACT inhaler 2 puff, 2 puff, Inhalation, BID RT, Ed Pino MD, 2 puff at 03/31/24 0859 ______________________________________________    Signed: Oliver Murrell MD

## 2024-03-31 NOTE — PLAN OF CARE
Problem: Discharge Planning  Goal: Discharge to home or other facility with appropriate resources  Outcome: Progressing  Flowsheets (Taken 3/30/2024 2024)  Discharge to home or other facility with appropriate resources: Identify barriers to discharge with patient and caregiver     Problem: Pain  Goal: Verbalizes/displays adequate comfort level or baseline comfort level  Outcome: Progressing     Problem: Chronic Conditions and Co-morbidities  Goal: Patient's chronic conditions and co-morbidity symptoms are monitored and maintained or improved  Outcome: Progressing  Flowsheets (Taken 3/30/2024 2024)  Care Plan - Patient's Chronic Conditions and Co-Morbidity Symptoms are Monitored and Maintained or Improved: Monitor and assess patient's chronic conditions and comorbid symptoms for stability, deterioration, or improvement     Problem: Safety - Adult  Goal: Free from fall injury  Outcome: Progressing  Flowsheets (Taken 3/30/2024 2229)  Free From Fall Injury: Instruct family/caregiver on patient safety     Problem: Skin/Tissue Integrity  Goal: Absence of new skin breakdown  Description: 1.  Monitor for areas of redness and/or skin breakdown  2.  Assess vascular access sites hourly  3.  Every 4-6 hours minimum:  Change oxygen saturation probe site  4.  Every 4-6 hours:  If on nasal continuous positive airway pressure, respiratory therapy assess nares and determine need for appliance change or resting period.  Outcome: Progressing     Problem: ABCDS Injury Assessment  Goal: Absence of physical injury  Outcome: Progressing

## 2024-04-01 LAB
ALBUMIN SERPL-MCNC: 2.6 G/DL (ref 3.5–5)
ANION GAP SERPL CALC-SCNC: 5 MMOL/L (ref 5–15)
BNP SERPL-MCNC: 1276 PG/ML
BUN SERPL-MCNC: 42 MG/DL (ref 6–20)
BUN/CREAT SERPL: 21 (ref 12–20)
CA-I BLD-MCNC: 9.2 MG/DL (ref 8.5–10.1)
CHLORIDE SERPL-SCNC: 104 MMOL/L (ref 97–108)
CO2 SERPL-SCNC: 32 MMOL/L (ref 21–32)
CREAT SERPL-MCNC: 2.03 MG/DL (ref 0.7–1.3)
GLUCOSE BLD STRIP.AUTO-MCNC: 145 MG/DL (ref 65–100)
GLUCOSE BLD STRIP.AUTO-MCNC: 168 MG/DL (ref 65–100)
GLUCOSE BLD STRIP.AUTO-MCNC: 187 MG/DL (ref 65–100)
GLUCOSE BLD STRIP.AUTO-MCNC: 293 MG/DL (ref 65–100)
GLUCOSE SERPL-MCNC: 170 MG/DL (ref 65–100)
PERFORMED BY:: ABNORMAL
PHOSPHATE SERPL-MCNC: 2.4 MG/DL (ref 2.6–4.7)
POTASSIUM SERPL-SCNC: 3.7 MMOL/L (ref 3.5–5.1)
SODIUM SERPL-SCNC: 141 MMOL/L (ref 136–145)
TROPONIN I SERPL HS-MCNC: 57 NG/L (ref 0–76)

## 2024-04-01 PROCEDURE — 6360000002 HC RX W HCPCS: Performed by: FAMILY MEDICINE

## 2024-04-01 PROCEDURE — 94640 AIRWAY INHALATION TREATMENT: CPT

## 2024-04-01 PROCEDURE — 1100000000 HC RM PRIVATE

## 2024-04-01 PROCEDURE — 6370000000 HC RX 637 (ALT 250 FOR IP): Performed by: INTERNAL MEDICINE

## 2024-04-01 PROCEDURE — 84484 ASSAY OF TROPONIN QUANT: CPT

## 2024-04-01 PROCEDURE — 6370000000 HC RX 637 (ALT 250 FOR IP): Performed by: FAMILY MEDICINE

## 2024-04-01 PROCEDURE — 94660 CPAP INITIATION&MGMT: CPT

## 2024-04-01 PROCEDURE — 82962 GLUCOSE BLOOD TEST: CPT

## 2024-04-01 PROCEDURE — 97530 THERAPEUTIC ACTIVITIES: CPT

## 2024-04-01 PROCEDURE — 36415 COLL VENOUS BLD VENIPUNCTURE: CPT

## 2024-04-01 PROCEDURE — 2500000003 HC RX 250 WO HCPCS: Performed by: FAMILY MEDICINE

## 2024-04-01 PROCEDURE — 80069 RENAL FUNCTION PANEL: CPT

## 2024-04-01 PROCEDURE — 83880 ASSAY OF NATRIURETIC PEPTIDE: CPT

## 2024-04-01 PROCEDURE — 51798 US URINE CAPACITY MEASURE: CPT

## 2024-04-01 PROCEDURE — 2580000003 HC RX 258: Performed by: FAMILY MEDICINE

## 2024-04-01 PROCEDURE — 94761 N-INVAS EAR/PLS OXIMETRY MLT: CPT

## 2024-04-01 PROCEDURE — 2700000000 HC OXYGEN THERAPY PER DAY

## 2024-04-01 RX ORDER — FUROSEMIDE 10 MG/ML
40 INJECTION INTRAMUSCULAR; INTRAVENOUS ONCE
Status: COMPLETED | OUTPATIENT
Start: 2024-04-01 | End: 2024-04-01

## 2024-04-01 RX ADMIN — HEPARIN SODIUM 5000 UNITS: 5000 INJECTION INTRAVENOUS; SUBCUTANEOUS at 05:36

## 2024-04-01 RX ADMIN — BUDESONIDE AND FORMOTEROL FUMARATE DIHYDRATE 2 PUFF: 160; 4.5 AEROSOL RESPIRATORY (INHALATION) at 20:49

## 2024-04-01 RX ADMIN — OXYCODONE HYDROCHLORIDE 10 MG: 10 TABLET ORAL at 09:50

## 2024-04-01 RX ADMIN — FUROSEMIDE 40 MG: 10 INJECTION, SOLUTION INTRAMUSCULAR; INTRAVENOUS at 14:01

## 2024-04-01 RX ADMIN — IPRATROPIUM BROMIDE 2 SPRAY: 21 SPRAY NASAL at 14:07

## 2024-04-01 RX ADMIN — SODIUM CHLORIDE, PRESERVATIVE FREE 10 ML: 5 INJECTION INTRAVENOUS at 21:19

## 2024-04-01 RX ADMIN — OXYCODONE HYDROCHLORIDE AND ACETAMINOPHEN 1000 MG: 500 TABLET ORAL at 09:49

## 2024-04-01 RX ADMIN — OXYCODONE HYDROCHLORIDE 10 MG: 10 TABLET ORAL at 17:54

## 2024-04-01 RX ADMIN — Medication 1 CAPSULE: at 10:03

## 2024-04-01 RX ADMIN — HEPARIN SODIUM 5000 UNITS: 5000 INJECTION INTRAVENOUS; SUBCUTANEOUS at 21:18

## 2024-04-01 RX ADMIN — Medication 400 UNITS: at 21:22

## 2024-04-01 RX ADMIN — SODIUM CHLORIDE, PRESERVATIVE FREE 10 ML: 5 INJECTION INTRAVENOUS at 09:57

## 2024-04-01 RX ADMIN — GABAPENTIN 400 MG: 400 CAPSULE ORAL at 21:18

## 2024-04-01 RX ADMIN — INSULIN LISPRO 8 UNITS: 100 INJECTION, SOLUTION INTRAVENOUS; SUBCUTANEOUS at 17:53

## 2024-04-01 RX ADMIN — COLCHICINE 0.6 MG: 0.6 TABLET ORAL at 09:49

## 2024-04-01 RX ADMIN — OXYCODONE HYDROCHLORIDE 10 MG: 10 TABLET ORAL at 21:18

## 2024-04-01 RX ADMIN — ASPIRIN 81 MG 81 MG: 81 TABLET ORAL at 09:49

## 2024-04-01 RX ADMIN — FLUTICASONE PROPIONATE 1 SPRAY: 50 SPRAY, METERED NASAL at 09:54

## 2024-04-01 RX ADMIN — OXYCODONE HYDROCHLORIDE AND ACETAMINOPHEN 1000 MG: 500 TABLET ORAL at 21:17

## 2024-04-01 RX ADMIN — IPRATROPIUM BROMIDE 2 SPRAY: 21 SPRAY NASAL at 09:56

## 2024-04-01 RX ADMIN — OXYCODONE HYDROCHLORIDE 10 MG: 10 TABLET ORAL at 14:01

## 2024-04-01 RX ADMIN — ALLOPURINOL 200 MG: 100 TABLET ORAL at 09:49

## 2024-04-01 RX ADMIN — INSULIN GLARGINE 34 UNITS: 100 INJECTION, SOLUTION SUBCUTANEOUS at 21:18

## 2024-04-01 RX ADMIN — Medication 400 UNITS: at 09:49

## 2024-04-01 RX ADMIN — FLUTICASONE PROPIONATE 1 SPRAY: 50 SPRAY, METERED NASAL at 21:20

## 2024-04-01 RX ADMIN — GABAPENTIN 400 MG: 400 CAPSULE ORAL at 09:50

## 2024-04-01 RX ADMIN — ONDANSETRON 4 MG: 4 TABLET, ORALLY DISINTEGRATING ORAL at 09:50

## 2024-04-01 RX ADMIN — IPRATROPIUM BROMIDE 2 SPRAY: 21 SPRAY NASAL at 21:20

## 2024-04-01 RX ADMIN — ATORVASTATIN CALCIUM 40 MG: 40 TABLET, FILM COATED ORAL at 21:18

## 2024-04-01 RX ADMIN — GABAPENTIN 400 MG: 400 CAPSULE ORAL at 14:01

## 2024-04-01 RX ADMIN — ACETAMINOPHEN 650 MG: 325 TABLET ORAL at 09:49

## 2024-04-01 RX ADMIN — HEPARIN SODIUM 5000 UNITS: 5000 INJECTION INTRAVENOUS; SUBCUTANEOUS at 14:01

## 2024-04-01 RX ADMIN — BUDESONIDE AND FORMOTEROL FUMARATE DIHYDRATE 2 PUFF: 160; 4.5 AEROSOL RESPIRATORY (INHALATION) at 08:20

## 2024-04-01 ASSESSMENT — PAIN DESCRIPTION - DESCRIPTORS
DESCRIPTORS: ACHING

## 2024-04-01 ASSESSMENT — PAIN DESCRIPTION - ORIENTATION
ORIENTATION: RIGHT;LEFT
ORIENTATION: RIGHT;LEFT

## 2024-04-01 ASSESSMENT — PAIN SCALES - GENERAL
PAINLEVEL_OUTOF10: 10
PAINLEVEL_OUTOF10: 8
PAINLEVEL_OUTOF10: 0
PAINLEVEL_OUTOF10: 8
PAINLEVEL_OUTOF10: 9
PAINLEVEL_OUTOF10: 7
PAINLEVEL_OUTOF10: 8
PAINLEVEL_OUTOF10: 0

## 2024-04-01 ASSESSMENT — PAIN DESCRIPTION - LOCATION
LOCATION: FOOT
LOCATION: FOOT;LEG;NECK
LOCATION: FOOT

## 2024-04-01 ASSESSMENT — ENCOUNTER SYMPTOMS
GASTROINTESTINAL NEGATIVE: 1
SHORTNESS OF BREATH: 1

## 2024-04-01 NOTE — CARE COORDINATION
CM has reviewed pt chart    DCP: return to [facility] (short term); from home with wife    1030: Post IDR pt has been identified as a poss dc. CM sent message to [facility] to see if they are able to accept today. Awaiting reply.     1041: [facility] replied stating they are able to accept today. Awaiting dc order.

## 2024-04-01 NOTE — PROGRESS NOTES
Patient refused for CPAP for the time being and said that he will just call once he needs it before sleep.   0315 CPAP attached to patient c/o RT. Kept monitored.

## 2024-04-01 NOTE — PROGRESS NOTES
History and Physical    NAME:   Shantanu Casillas   :  1955   MRN:  [id number]     Date/Time: 2024 12:24 PM    Patient PCP: Ed Pino MD  ______________________________________________________________________       Subjective:     CHIEF COMPLAINT:     Abnormal labs        HISTORY OF PRESENT ILLNESS:     General Daily Progress Note  Patient is a 68 y.o. year old male past medical history of COPD type 2 diabetes hypertension gout COPD CHF ejection fraction 4045% venous stasis ulcers chronic kidney disease obstructive sleep apnea who recently discharged from the hospital more than a month ago with community-acquired pneumonia at the nursing facility blood work done shows today acute kidney injury seen by the ER physician recommended patient to be admitted for further evaluation treatment patient have ultrasound of the kidneys done which is pending    3/28  Patient alert awake denies any chest pain or shortness of breath or nausea no vomiting feeling better    3/29    Patient alert in no distress complaining of cough congestion      Patient alert awake feeling much better renal function much improved    Past Medical History:   Diagnosis Date    Arthritis     CAD (coronary artery disease)     Chronic obstructive pulmonary disease (HCC)     Diabetes (HCC)     Gout     Hypertension     Ill-defined condition     Sleep apnea     cpap        Past Surgical History:   Procedure Laterality Date    FOOT DEBRIDEMENT Right 12/15/2023    INCISION AND DRAINAGE RIGHT FIRST METATARSAL PHALANGEAL JOINT performed by Polo Oviedo DPM at [facility] MAIN OR    FOOT SURGERY      right heel - foriegn object    HX VEIN ABLATION ADHESIVE      ORTHOPEDIC SURGERY      back surgery    PACEMAKER      aicd -       Social History     Tobacco Use    Smoking status: Never    Smokeless tobacco: Never   Substance Use Topics    Alcohol use: Not Currently        Family History   Problem Relation Age of Onset    Diabetes Mother     Heart  4 mg at 04/01/24 0950 **OR** ondansetron (ZOFRAN) injection 4 mg, 4 mg, IntraVENous, Q6H PRN, Ed Pino MD    polyethylene glycol (GLYCOLAX) packet 17 g, 17 g, Oral, Daily PRN, Ed Pino MD    acetaminophen (TYLENOL) tablet 650 mg, 650 mg, Oral, Q6H PRN, 650 mg at 04/01/24 0949 **OR** acetaminophen (TYLENOL) suppository 650 mg, 650 mg, Rectal, Q6H PRN, Ed Pino MD    heparin (porcine) injection 5,000 Units, 5,000 Units, SubCUTAneous, 3 times per day, Ed Pino MD, 5,000 Units at 04/01/24 0536    budesonide-formoterol (SYMBICORT) 160-4.5 MCG/ACT inhaler 2 puff, 2 puff, Inhalation, BID RT, Ed Pino MD, 2 puff at 04/01/24 0820 ______________________________________________    Signed: Ed Pino MD

## 2024-04-01 NOTE — PLAN OF CARE
Problem: Discharge Planning  Goal: Discharge to home or other facility with appropriate resources  3/31/2024 2226 by Dee Dee Prakash RN  Outcome: Progressing  Flowsheets (Taken 3/31/2024 2100)  Discharge to home or other facility with appropriate resources: Identify barriers to discharge with patient and caregiver  3/31/2024 1011 by Dave Cordon RN  Outcome: Progressing  Flowsheets (Taken 3/31/2024 0735)  Discharge to home or other facility with appropriate resources: Identify barriers to discharge with patient and caregiver     Problem: Pain  Goal: Verbalizes/displays adequate comfort level or baseline comfort level  3/31/2024 2226 by Dee Dee Prakash RN  Outcome: Progressing  3/31/2024 1011 by Dave Cordon RN  Outcome: Progressing     Problem: Chronic Conditions and Co-morbidities  Goal: Patient's chronic conditions and co-morbidity symptoms are monitored and maintained or improved  3/31/2024 2226 by Dee Dee Prakash RN  Outcome: Progressing  Flowsheets (Taken 3/31/2024 2100)  Care Plan - Patient's Chronic Conditions and Co-Morbidity Symptoms are Monitored and Maintained or Improved: Monitor and assess patient's chronic conditions and comorbid symptoms for stability, deterioration, or improvement  3/31/2024 1011 by Dave Cordon RN  Outcome: Progressing  Flowsheets (Taken 3/31/2024 0735)  Care Plan - Patient's Chronic Conditions and Co-Morbidity Symptoms are Monitored and Maintained or Improved: Monitor and assess patient's chronic conditions and comorbid symptoms for stability, deterioration, or improvement     Problem: Safety - Adult  Goal: Free from fall injury  3/31/2024 2226 by Dee Dee Prakash RN  Outcome: Progressing  Flowsheets (Taken 3/31/2024 2222)  Free From Fall Injury: Instruct family/caregiver on patient safety  3/31/2024 1011 by Dave Cordon RN  Outcome: Progressing     Problem: Skin/Tissue Integrity  Goal: Absence of new skin breakdown  Description: 1.  Monitor for areas of

## 2024-04-01 NOTE — PROGRESS NOTES
OCCUPATIONAL THERAPY TREATMENT  Patient: Shantanu Casillas (68 y.o. male)  Date: 4/1/2024  Primary Diagnosis: DEMARCO (acute kidney injury) (HCC) [N17.9]       Precautions: Fall Risk, Contact Precautions                Recommendations for nursing mobility: Out of bed to chair for meals, Encourage HEP in prep for ADLs/mobility; see handout for details, Frequent repositioning to prevent skin breakdown, Use of bed/chair alarm for safety, AD and gt belt for bed to chair , Amb to bathroom with AD and gait belt, and Assist x1    In place during session: Nasal Cannula 2L and External Catheter  Chart, occupational therapy assessment, plan of care, and goals were reviewed.  ASSESSMENT  Patient continues with skilled OT services and is progressing towards goals. Pt semi supine in bed upon EDEN arrival, agreeable to session. Pt A&O x 4. Pt found soiled w/ urine and required extra time for cleaning. See below for details of ADLs.  Pt completed bed mobility to eob w/ min A. PCT joined session to assist w/ changing bed linens and for sts w/ rw and min A x 2.  Pt required TA for posterior kaylah care in standing.  Education provided on energy conservation, and safety awareness  w/ pt verbalizing good understanding. Pt declined offer to t/f to recliner.  Pt left seated at eob w/ PCT.  All known needs met.  (See below for objective details and assist levels).     Overall pt tolerated session well today with rest breaks. Potential barriers for safe discharge: pts current support system is unable to meet their requirements for physical assistance, pt is a high fall risk, pt is not safe to be alone, and concern for pt safely navigating or managing the home environment. Current OT recommendations for discharge Skilled Nursing Facility. Will continue to benefit from skilled OT services, and will continue to progress as tolerated.      Start of Session End of Session   SPO2 (%) 91 95   Heart Rate (BPM) 112 110     GOALS:    Problem:

## 2024-04-01 NOTE — PROGRESS NOTES
PULMONARY NOTE  [facility]    Name: Shantanu Casillas MRN: [id number]   : 1955 Hospital: [facility]   Date: 2024  Admission date: 3/27/2024 Hospital Day: 6       HPI:     Patient Active Problem List   Diagnosis    Onychomycosis    Non-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 (HCC)    Idiopathic chronic venous hypertension of left lower extremity with ulcer (HCC)    Ulcer of left foot, with fat layer exposed (HCC)    Cellulitis    COVID-19    Venous ulcer (HCC)    Hyperkeratosis    Localized edema    Type 2 diabetes mellitus with diabetic polyneuropathy, with long-term current use of insulin (HCC)    Cellulitis and abscess of right leg    Chronic ulcer of right heel limited to breakdown of skin (HCC)    Calf ulcer, left, with fat layer exposed (HCC)    Ankle ulcer, right, with fat layer exposed (HCC)    Non-pressure chronic ulcer of other part of right lower leg with fat layer exposed (HCC)    DEMARCO (acute kidney injury) (HCC)    Back pain    Acute on chronic systolic CHF (congestive heart failure), NYHA class 2 (HCC)    Shortness of breath    Acute on chronic congestive heart failure (HCC)    Wound infection    Venous stasis ulcer (HCC)    Open wound of right foot    Atherosclerotic heart disease of native coronary artery without angina pectoris    Chronic obstructive pulmonary disease, unspecified (HCC)    Chronic pain disorder    Dependence on supplemental oxygen    Difficulty in walking, not elsewhere classified    Gout, unspecified    Hypertensive heart disease with heart failure (HCC)    Morbid (severe) obesity due to excess calories (HCC)    Muscle weakness (generalized)    Obstructive sleep apnea (adult) (pediatric)    Other lack of coordination    Other symptoms and signs involving the musculoskeletal system    Personal history of COVID-19    Presence of cardiac pacemaker    Type 2 diabetes mellitus with diabetic peripheral angiopathy  count 12,000        [place] count colonies/ml        Culture       Mixed urogenital ady isolated                   Imaging:  US RETROPERITONEAL COMPLETE    Result Date: 3/27/2024  EXAM: US RETROPERITONEAL COMPLETE ACC#: [id number] INDICATION: DEMARCO, eval for obstructive uropathy, hydro, medical renal disease COMPARISON: None. TECHNIQUE:  Sonographic evaluation of the kidneys and bladder was performed. FINDINGS: Right kidney: 9.7 cm long. The kidney is reniform. There is no hydronephrosis. Renal parenchyma is echogenic. Left kidney: 11.2 cm long. The kidney is echogenic. It is reniform with no hydronephrosis. There is an echogenic focus in the lower portion of the kidney which may represent a stone. Bladder: No stones. No significant bladder wall thickening. Retroperitoneum: The aorta and inferior vena cava not visualized due to body habitus.     Echogenic kidneys may be secondary to medical renal disease. Possible stone in the left kidney. There is no hydronephrosis.        ARTERIAL BLOOD GAS      IMPRESSION:   Acute on chronic respiratory failure with Hypoxia currently on his baseline 3 L nasal oxygen  Acute kidney injury  Cardiomyopathy 6/2023 ejection fraction 40%  Sinus congestion questionably combination allergic rhinitis and vasomotor rhinitis  CHF with Diastolic dysfunction  Right venous stasis of lower extremities  G47.33 Obstructive sleep apnea (adult) (pediatric)  J44.9 Chronic obstructive pulmonary disease, unspecified  Gouty arthritis  Z86.16 Personal history of COVID-19  Pt is requiring Drug therapy requiring intensive monitoring for toxicity  Pt is unstable, unpredictable needing inpatient monitoring; is acutely ill and at high risk of sudden decline and decompensation with severe consequenses and continued end organ dysfunction and failure  Prognosis guarded       RECOMMENDATIONS/PLAN:     68-year-old obese male came in because of shortness of breath and dyspnea right now he is on 3 L nasal Cannula

## 2024-04-02 VITALS
HEIGHT: 71 IN | OXYGEN SATURATION: 99 % | BODY MASS INDEX: 44.1 KG/M2 | HEART RATE: 100 BPM | DIASTOLIC BLOOD PRESSURE: 51 MMHG | TEMPERATURE: 97.8 F | SYSTOLIC BLOOD PRESSURE: 103 MMHG | RESPIRATION RATE: 17 BRPM | WEIGHT: 315 LBS

## 2024-04-02 PROBLEM — N17.9 AKI (ACUTE KIDNEY INJURY) (HCC): Status: RESOLVED | Noted: 2022-07-11 | Resolved: 2024-04-02

## 2024-04-02 LAB
ALBUMIN SERPL-MCNC: 2.6 G/DL (ref 3.5–5)
ALBUMIN SERPL-MCNC: 2.7 G/DL (ref 3.5–5)
ALBUMIN/GLOB SERPL: 0.6 (ref 1.1–2.2)
ALP SERPL-CCNC: 59 U/L (ref 45–117)
ALT SERPL-CCNC: 37 U/L (ref 12–78)
ANION GAP SERPL CALC-SCNC: 3 MMOL/L (ref 5–15)
ANION GAP SERPL CALC-SCNC: 4 MMOL/L (ref 5–15)
AST SERPL W P-5'-P-CCNC: 45 U/L (ref 15–37)
BILIRUB SERPL-MCNC: 0.8 MG/DL (ref 0.2–1)
BNP SERPL-MCNC: 1113 PG/ML
BUN SERPL-MCNC: 51 MG/DL (ref 6–20)
BUN SERPL-MCNC: 52 MG/DL (ref 6–20)
BUN/CREAT SERPL: 16 (ref 12–20)
BUN/CREAT SERPL: 16 (ref 12–20)
CA-I BLD-MCNC: 9 MG/DL (ref 8.5–10.1)
CA-I BLD-MCNC: 9.1 MG/DL (ref 8.5–10.1)
CHLORIDE SERPL-SCNC: 101 MMOL/L (ref 97–108)
CHLORIDE SERPL-SCNC: 102 MMOL/L (ref 97–108)
CO2 SERPL-SCNC: 33 MMOL/L (ref 21–32)
CO2 SERPL-SCNC: 33 MMOL/L (ref 21–32)
CREAT SERPL-MCNC: 3.21 MG/DL (ref 0.7–1.3)
CREAT SERPL-MCNC: 3.28 MG/DL (ref 0.7–1.3)
ERYTHROCYTE [DISTWIDTH] IN BLOOD BY AUTOMATED COUNT: 15.8 % (ref 11.5–14.5)
GLOBULIN SER CALC-MCNC: 4.6 G/DL (ref 2–4)
GLUCOSE BLD STRIP.AUTO-MCNC: 135 MG/DL (ref 65–100)
GLUCOSE BLD STRIP.AUTO-MCNC: 186 MG/DL (ref 65–100)
GLUCOSE SERPL-MCNC: 132 MG/DL (ref 65–100)
GLUCOSE SERPL-MCNC: 133 MG/DL (ref 65–100)
HCT VFR BLD AUTO: 30.5 % (ref 36.6–50.3)
HGB BLD-MCNC: 9.7 G/DL (ref 12.1–17)
MCH RBC QN AUTO: 30.8 PG (ref 26–34)
MCHC RBC AUTO-ENTMCNC: 31.8 G/DL (ref 30–36.5)
MCV RBC AUTO: 96.8 FL (ref 80–99)
NRBC # BLD: 0 K/UL (ref 0–0.01)
NRBC BLD-RTO: 0 PER 100 WBC
PERFORMED BY:: ABNORMAL
PERFORMED BY:: ABNORMAL
PHOSPHATE SERPL-MCNC: 3.8 MG/DL (ref 2.6–4.7)
PLATELET # BLD AUTO: 183 K/UL (ref 150–400)
PMV BLD AUTO: 11.2 FL (ref 8.9–12.9)
POTASSIUM SERPL-SCNC: 4 MMOL/L (ref 3.5–5.1)
POTASSIUM SERPL-SCNC: 4 MMOL/L (ref 3.5–5.1)
PROT SERPL-MCNC: 7.2 G/DL (ref 6.4–8.2)
RBC # BLD AUTO: 3.15 M/UL (ref 4.1–5.7)
SODIUM SERPL-SCNC: 138 MMOL/L (ref 136–145)
SODIUM SERPL-SCNC: 138 MMOL/L (ref 136–145)
WBC # BLD AUTO: 10 K/UL (ref 4.1–11.1)

## 2024-04-02 PROCEDURE — 6370000000 HC RX 637 (ALT 250 FOR IP): Performed by: FAMILY MEDICINE

## 2024-04-02 PROCEDURE — 85027 COMPLETE CBC AUTOMATED: CPT

## 2024-04-02 PROCEDURE — 36415 COLL VENOUS BLD VENIPUNCTURE: CPT

## 2024-04-02 PROCEDURE — 94640 AIRWAY INHALATION TREATMENT: CPT

## 2024-04-02 PROCEDURE — 2700000000 HC OXYGEN THERAPY PER DAY

## 2024-04-02 PROCEDURE — 80069 RENAL FUNCTION PANEL: CPT

## 2024-04-02 PROCEDURE — 94660 CPAP INITIATION&MGMT: CPT

## 2024-04-02 PROCEDURE — 2580000003 HC RX 258: Performed by: FAMILY MEDICINE

## 2024-04-02 PROCEDURE — 6370000000 HC RX 637 (ALT 250 FOR IP): Performed by: INTERNAL MEDICINE

## 2024-04-02 PROCEDURE — 94761 N-INVAS EAR/PLS OXIMETRY MLT: CPT

## 2024-04-02 PROCEDURE — 6360000002 HC RX W HCPCS: Performed by: FAMILY MEDICINE

## 2024-04-02 PROCEDURE — 2500000003 HC RX 250 WO HCPCS: Performed by: FAMILY MEDICINE

## 2024-04-02 PROCEDURE — 82962 GLUCOSE BLOOD TEST: CPT

## 2024-04-02 PROCEDURE — 83880 ASSAY OF NATRIURETIC PEPTIDE: CPT

## 2024-04-02 PROCEDURE — 80053 COMPREHEN METABOLIC PANEL: CPT

## 2024-04-02 RX ORDER — IPRATROPIUM BROMIDE 21 UG/1
2 SPRAY, METERED NASAL 3 TIMES DAILY
Qty: 30 ML | Refills: 3 | Status: SHIPPED | OUTPATIENT
Start: 2024-04-02

## 2024-04-02 RX ORDER — PANTOPRAZOLE SODIUM 40 MG/1
40 TABLET, DELAYED RELEASE ORAL
Qty: 30 TABLET | Refills: 3 | Status: SHIPPED | OUTPATIENT
Start: 2024-04-03

## 2024-04-02 RX ADMIN — COLCHICINE 0.6 MG: 0.6 TABLET ORAL at 09:54

## 2024-04-02 RX ADMIN — OXYCODONE HYDROCHLORIDE 10 MG: 10 TABLET ORAL at 13:12

## 2024-04-02 RX ADMIN — BUDESONIDE AND FORMOTEROL FUMARATE DIHYDRATE 2 PUFF: 160; 4.5 AEROSOL RESPIRATORY (INHALATION) at 08:16

## 2024-04-02 RX ADMIN — Medication 1 CAPSULE: at 09:55

## 2024-04-02 RX ADMIN — IPRATROPIUM BROMIDE 2 SPRAY: 21 SPRAY NASAL at 09:58

## 2024-04-02 RX ADMIN — GABAPENTIN 400 MG: 400 CAPSULE ORAL at 09:55

## 2024-04-02 RX ADMIN — SODIUM CHLORIDE, PRESERVATIVE FREE 10 ML: 5 INJECTION INTRAVENOUS at 09:00

## 2024-04-02 RX ADMIN — IPRATROPIUM BROMIDE 2 SPRAY: 21 SPRAY NASAL at 14:34

## 2024-04-02 RX ADMIN — Medication 400 UNITS: at 09:54

## 2024-04-02 RX ADMIN — OXYCODONE HYDROCHLORIDE 10 MG: 10 TABLET ORAL at 09:54

## 2024-04-02 RX ADMIN — FLUTICASONE PROPIONATE 1 SPRAY: 50 SPRAY, METERED NASAL at 09:58

## 2024-04-02 RX ADMIN — ALLOPURINOL 200 MG: 100 TABLET ORAL at 09:53

## 2024-04-02 RX ADMIN — OXYCODONE HYDROCHLORIDE AND ACETAMINOPHEN 1000 MG: 500 TABLET ORAL at 09:53

## 2024-04-02 RX ADMIN — PANTOPRAZOLE SODIUM 40 MG: 40 TABLET, DELAYED RELEASE ORAL at 05:49

## 2024-04-02 RX ADMIN — GABAPENTIN 400 MG: 400 CAPSULE ORAL at 14:34

## 2024-04-02 RX ADMIN — HEPARIN SODIUM 5000 UNITS: 5000 INJECTION INTRAVENOUS; SUBCUTANEOUS at 05:49

## 2024-04-02 RX ADMIN — HEPARIN SODIUM 5000 UNITS: 5000 INJECTION INTRAVENOUS; SUBCUTANEOUS at 14:30

## 2024-04-02 RX ADMIN — ASPIRIN 81 MG 81 MG: 81 TABLET ORAL at 09:55

## 2024-04-02 ASSESSMENT — ENCOUNTER SYMPTOMS
SHORTNESS OF BREATH: 1
GASTROINTESTINAL NEGATIVE: 1

## 2024-04-02 ASSESSMENT — PAIN DESCRIPTION - ORIENTATION
ORIENTATION: RIGHT;LEFT
ORIENTATION: RIGHT;LEFT

## 2024-04-02 ASSESSMENT — PAIN SCALES - GENERAL
PAINLEVEL_OUTOF10: 6
PAINLEVEL_OUTOF10: 10
PAINLEVEL_OUTOF10: 10
PAINLEVEL_OUTOF10: 5
PAINLEVEL_OUTOF10: 8
PAINLEVEL_OUTOF10: 8

## 2024-04-02 ASSESSMENT — PAIN DESCRIPTION - LOCATION
LOCATION: LEG
LOCATION: FOOT

## 2024-04-02 ASSESSMENT — PAIN DESCRIPTION - DESCRIPTORS: DESCRIPTORS: ACHING

## 2024-04-02 NOTE — DISCHARGE SUMMARY
Discharge Summary       PATIENT ID: Shantanu Casillas  MRN: [id number]   YOB: 1955    DATE OF ADMISSION: 3/27/2024   DATE OF DISCHARGE:   PRIMARY CARE PROVIDER: [unfilled]      ATTENDING PHYSICIAN: Ed Pino  DISCHARGING PROVIDER: Ed Pino      CONSULTATIONS: IP CONSULT TO NEPHROLOGY  IP WOUND CARE NURSE CONSULT TO EVAL  IP CONSULT TO PULMONOLOGY    PROCEDURES/SURGERIES: * No surgery found *    ADMITTING DIAGNOSES:    Patient Active Problem List    Diagnosis Date Noted    Generalized edema 02/19/2024    Acute pneumonia 02/13/2024    CAP (community acquired pneumonia) due to Chlamydia species 02/13/2024    Open wound of right foot 10/11/2023    Atherosclerotic heart disease of native coronary artery without angina pectoris 08/28/2023    Chronic obstructive pulmonary disease, unspecified (HCC) 08/28/2023    Chronic pain disorder 08/28/2023    Dependence on supplemental oxygen 08/28/2023    Difficulty in walking, not elsewhere classified 08/28/2023    Gout, unspecified 08/28/2023    Hypertensive heart disease with heart failure (HCC) 08/28/2023    Morbid (severe) obesity due to excess calories (HCC) 08/28/2023    Muscle weakness (generalized) 08/28/2023    Obstructive sleep apnea (adult) (pediatric) 08/28/2023    Other lack of coordination 08/28/2023    Other symptoms and signs involving the musculoskeletal system 08/28/2023    Personal history of COVID-19 08/28/2023    Presence of cardiac pacemaker 08/28/2023    Type 2 diabetes mellitus with diabetic peripheral angiopathy without gangrene (HCC) 08/28/2023    Unspecified cirrhosis of liver (HCC) 08/28/2023    Unspecified osteoarthritis, unspecified site 08/28/2023    Venous insufficiency (chronic) (peripheral) 08/28/2023    Acute on chronic congestive heart failure (HCC) 06/15/2023    Wound infection 06/15/2023    Venous stasis ulcer (HCC) 06/15/2023    Acute on chronic systolic CHF (congestive heart failure), NYHA class 2 (HCC) 06/14/2023

## 2024-04-02 NOTE — PROGRESS NOTES
Nephrology follow up          Patient: Shantanu Casillas MRN: [id number]  SSN: [id number]    YOB: 1955  Age: 68 y.o.  Sex: male      Subjective:   Pt is seen at the bedside  Bps are low  Good uop  Off IV fluids due to shortness of breath  Cr has bumped to 2.0     Past Medical History:   Diagnosis Date    Arthritis     CAD (coronary artery disease)     Chronic obstructive pulmonary disease (HCC)     Diabetes (HCC)     Gout     Hypertension     Ill-defined condition     Sleep apnea     cpap     Past Surgical History:   Procedure Laterality Date    FOOT DEBRIDEMENT Right 12/15/2023    INCISION AND DRAINAGE RIGHT FIRST METATARSAL PHALANGEAL JOINT performed by Polo Oviedo DPM at [facility] MAIN OR    FOOT SURGERY      right heel - foriegn object    HX VEIN ABLATION ADHESIVE      ORTHOPEDIC SURGERY      back surgery    PACEMAKER      aicd -      Family History   Problem Relation Age of Onset    Diabetes Mother     Heart Disease Father     Hypertension Father     Diabetes Sister     Diabetes Brother     Hypertension Mother     Heart Disease Mother     Kidney Disease Mother      Social History     Tobacco Use    Smoking status: Never    Smokeless tobacco: Never   Substance Use Topics    Alcohol use: Not Currently      Current Facility-Administered Medications   Medication Dose Route Frequency Provider Last Rate Last Admin    furosemide (LASIX) tablet 40 mg  40 mg Oral Once Oliver Murrell MD        gabapentin (NEURONTIN) capsule 400 mg  400 mg Oral TID Bhupendra Hayden MD   400 mg at 04/01/24 2118    ipratropium (ATROVENT) 0.03 % nasal spray 2 spray  2 spray Each Nostril TID Orion Hameed MD   2 spray at 04/01/24 2120    oxyCODONE HCl (OXY-IR) immediate release tablet 10 mg  10 mg Oral 4x Daily Ed Pino MD   10 mg at 04/01/24 2118    pantoprazole (PROTONIX) tablet 40 mg  40 mg Oral QAM AC Ed Pino MD   40 mg at 03/30/24 0801    albuterol sulfate HFA (PROVENTIL;VENTOLIN;PROAIR) 108 (90    BP: (!) 107/50  (!) 102/54    Pulse:   64 65   Resp:  18 20 18   Temp:   98.1 °F (36.7 °C)    TempSrc:   Oral    SpO2:   98% 95%   Weight:       Height:            Physical Exam:  General: NAD  Eyes: sclera anicteric  Oral Cavity: No thrush or ulcers  Neck: no JVD  Chest: Fair bilateral air entry  Heart: normal sounds  Abdomen: soft and non tender   : no juares  Lower Extremities: no edema  Skin: no rash  Neuro: intact  Psychiatric: non-depressed          Assessment/Plan:   Acute kidney injury  Prerenal azotemia from hypotension/volume depletion plus on Entresto and Bumex  On admission BUN/creatinine 148/2.7  BUN/creatinine improved to 61/1.4 today in response to IV fluids  Baseline creatinine 1.2 on 2/19/2024  UA bland apart from possible UTI  Renal ultrasound no hydronephrosis  Held Entresto and Bumex  Off IV fluids  Okay to discharge from renal standpoint  Advised to take Bumex 2 mg daily  Follow-up with me in 2 weeks in my office    Hyponatremia  Acute on chronic  Of moderate severity  From volume depletion  Resolved  Off isotonic IV fluids    Hypokalemia  K is 3.3-->3.6  P.o. KCl replacement    Metabolic alkalosis  Acute on chronic  From volume contraction plus renal compensation in the setting of chronic respiratory acidosis  IV fluids    History of CHF  LVEF 40 to 45%  Compensated  Held Bumex and Entresto due to DEMARCO  DC IV fluids  Resume Bumex 2 mg daily  Plan to resume Entresto once creatinine stabilizes at baseline    Neuropathy  On gabapentin 400 mg 3 times daily  His dose was decreased to 400 mg daily due to acute kidney injury  Resolving DEMARCO  resume gabapentin 400 mg tid.                Plan:       Signed By: Bhupendra Hayden MD     April 1, 2024

## 2024-04-02 NOTE — DISCHARGE INSTRUCTIONS
Discharge Instructions       PATIENT ID: Shantanu Casillas  MRN: [id number]   YOB: 1955    DATE OF ADMISSION: 3/27/2024  DATE OF DISCHARGE: 4/2/2024    PRIMARY CARE PROVIDER: [unfilled]     ATTENDING PHYSICIAN: Ed Pino MD   DISCHARGING PROVIDER: Ed Pino MD    To contact this individual call [phone] and ask the  to page.   If unavailable, ask to be transferred the Adult Hospitalist Department.    DISCHARGE DIAGNOSES acute kidney injury on chronic    CONSULTATIONS: [unfilled]      PROCEDURES/SURGERIES: * No surgery found *    PENDING TEST RESULTS:   At the time of discharge the following test results are still pending: None    FOLLOW UP APPOINTMENTS:   Ed Pino MD  [facility]   [facility] [address] [phone]    Schedule an appointment as soon as possible for a visit in 1 week(s)         ADDITIONAL CARE RECOMMENDATIONS: Follow-up renal function closely    DIET: Resume previous diet      ACTIVITY: Activity as tolerated    Wound care: {Discharge Wound Care Instructions:97552}    EQUIPMENT needed: ***      DISCHARGE MEDICATIONS:   See Medication Reconciliation Form    It is important that you take the medication exactly as they are prescribed.   Keep your medication in the bottles provided by the pharmacist and keep a list of the medication names, dosages, and times to be taken in your wallet.   Do not take other medications without consulting your doctor.       NOTIFY YOUR PHYSICIAN FOR ANY OF THE FOLLOWING:   Fever over 101 degrees for 24 hours.   Chest pain, shortness of breath, fever, chills, nausea, vomiting, diarrhea, change in mentation, falling, weakness, bleeding. Severe pain or pain not relieved by medications.  Or, any other signs or symptoms that you may have questions about.      DISPOSITION:    Home With:   OT  PT  HH  RN       SNF/Inpatient Rehab/LTAC    Independent/assisted living    Hospice    Other:         PROBLEM LIST  Updated:  Yes ***       Signed:   Ed Pino MD  4/2/2024  1:33 PM

## 2024-04-02 NOTE — DISCHARGE SUMMARY
Discharge Summary       PATIENT ID: Shantaun Casillas  MRN: [id number]   YOB: 1955    DATE OF ADMISSION: 3/27/2024   DATE OF DISCHARGE:   PRIMARY CARE PROVIDER: [unfilled]      ATTENDING PHYSICIAN: Ed Pino  DISCHARGING PROVIDER: Ed Pino      CONSULTATIONS: IP CONSULT TO NEPHROLOGY  IP WOUND CARE NURSE CONSULT TO EVAL  IP CONSULT TO PULMONOLOGY    PROCEDURES/SURGERIES: * No surgery found *    ADMITTING DIAGNOSES:    Patient Active Problem List    Diagnosis Date Noted    Generalized edema 02/19/2024    Acute pneumonia 02/13/2024    CAP (community acquired pneumonia) due to Chlamydia species 02/13/2024    Open wound of right foot 10/11/2023    Atherosclerotic heart disease of native coronary artery without angina pectoris 08/28/2023    Chronic obstructive pulmonary disease, unspecified (HCC) 08/28/2023    Chronic pain disorder 08/28/2023    Dependence on supplemental oxygen 08/28/2023    Difficulty in walking, not elsewhere classified 08/28/2023    Gout, unspecified 08/28/2023    Hypertensive heart disease with heart failure (HCC) 08/28/2023    Morbid (severe) obesity due to excess calories (HCC) 08/28/2023    Muscle weakness (generalized) 08/28/2023    Obstructive sleep apnea (adult) (pediatric) 08/28/2023    Other lack of coordination 08/28/2023    Other symptoms and signs involving the musculoskeletal system 08/28/2023    Personal history of COVID-19 08/28/2023    Presence of cardiac pacemaker 08/28/2023    Type 2 diabetes mellitus with diabetic peripheral angiopathy without gangrene (HCC) 08/28/2023    Unspecified cirrhosis of liver (HCC) 08/28/2023    Unspecified osteoarthritis, unspecified site 08/28/2023    Venous insufficiency (chronic) (peripheral) 08/28/2023    Acute on chronic congestive heart failure (HCC) 06/15/2023    Wound infection 06/15/2023    Venous stasis ulcer (HCC) 06/15/2023    Acute on chronic systolic CHF (congestive heart failure), NYHA class 2 (HCC) 06/14/2023  Ratio 16 12 - 20      Est, Glom Filt Rate 20 (L) >60 ml/min/1.73m2    Calcium 9.0 8.5 - 10.1 mg/dL    Total Bilirubin 0.8 0.2 - 1.0 mg/dL    AST 45 (H) 15 - 37 U/L    ALT 37 12 - 78 U/L    Alk Phosphatase 59 45 - 117 U/L    Total Protein 7.2 6.4 - 8.2 g/dL    Albumin 2.6 (L) 3.5 - 5.0 g/dL    Globulin 4.6 (H) 2.0 - 4.0 g/dL    Albumin/Globulin Ratio 0.6 (L) 1.1 - 2.2     Brain Natriuretic Peptide    Collection Time: 04/02/24  6:37 AM   Result Value Ref Range    NT Pro-BNP 1,113 (H) <125 pg/mL   Renal Function Panel    Collection Time: 04/02/24  6:40 AM   Result Value Ref Range    Sodium 138 136 - 145 mmol/L    Potassium 4.0 3.5 - 5.1 mmol/L    Chloride 101 97 - 108 mmol/L    CO2 33 (H) 21 - 32 mmol/L    Anion Gap 4 (L) 5 - 15 mmol/L    Glucose 133 (H) 65 - 100 mg/dL    BUN 51 (H) 6 - 20 mg/dL    Creatinine 3.28 (H) 0.70 - 1.30 mg/dL    Bun/Cre Ratio 16 12 - 20      Est, Glom Filt Rate 20 (L) >60 ml/min/1.73m2    Calcium 9.1 8.5 - 10.1 mg/dL    Phosphorus 3.8 2.6 - 4.7 mg/dL    Albumin 2.7 (L) 3.5 - 5.0 g/dL   POCT Glucose    Collection Time: 04/02/24  7:52 AM   Result Value Ref Range    POC Glucose 135 (H) 65 - 100 mg/dL    Performed by: Ailyn Nugent    POCT Glucose    Collection Time: 04/02/24 11:15 AM   Result Value Ref Range    POC Glucose 186 (H) 65 - 100 mg/dL    Performed by: Ailyn Nugent       No results found.       HOSPITAL COURSE:    HISTORY OF PRESENT ILLNESS:     General Daily Progress Note  Patient is a 68 y.o. year old male past medical history of COPD type 2 diabetes hypertension gout COPD CHF ejection fraction 4045% venous stasis ulcers chronic kidney disease obstructive sleep apnea who recently discharged from the hospital more than a month ago with community-acquired pneumonia at the nursing facility blood work done shows today acute kidney injury seen by the ER physician recommended patient to be admitted for further evaluation treatment patient have ultrasound of the kidneys done which is

## 2024-04-02 NOTE — PROGRESS NOTES
..Discharge plan of care/case management plan validated with provider discharge order.     Writer called [facility], report given to receiving nurse Nancy using SBAR. All questions were answered.

## 2024-04-02 NOTE — PROGRESS NOTES
PULMONARY NOTE  [facility]    Name: Shantanu Casillas MRN: [id number]   : 1955 Hospital: [facility]   Date: 2024  Admission date: 3/27/2024 Hospital Day: 7       HPI:     Patient Active Problem List   Diagnosis    Onychomycosis    Non-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 (HCC)    Idiopathic chronic venous hypertension of left lower extremity with ulcer (HCC)    Ulcer of left foot, with fat layer exposed (HCC)    Cellulitis    COVID-19    Venous ulcer (HCC)    Hyperkeratosis    Localized edema    Type 2 diabetes mellitus with diabetic polyneuropathy, with long-term current use of insulin (HCC)    Cellulitis and abscess of right leg    Chronic ulcer of right heel limited to breakdown of skin (HCC)    Calf ulcer, left, with fat layer exposed (HCC)    Ankle ulcer, right, with fat layer exposed (HCC)    Non-pressure chronic ulcer of other part of right lower leg with fat layer exposed (HCC)    DEMARCO (acute kidney injury) (HCC)    Back pain    Acute on chronic systolic CHF (congestive heart failure), NYHA class 2 (HCC)    Shortness of breath    Acute on chronic congestive heart failure (HCC)    Wound infection    Venous stasis ulcer (HCC)    Open wound of right foot    Atherosclerotic heart disease of native coronary artery without angina pectoris    Chronic obstructive pulmonary disease, unspecified (HCC)    Chronic pain disorder    Dependence on supplemental oxygen    Difficulty in walking, not elsewhere classified    Gout, unspecified    Hypertensive heart disease with heart failure (HCC)    Morbid (severe) obesity due to excess calories (HCC)    Muscle weakness (generalized)    Obstructive sleep apnea (adult) (pediatric)    Other lack of coordination    Other symptoms and signs involving the musculoskeletal system    Personal history of COVID-19    Presence of cardiac pacemaker    Type 2 diabetes mellitus with diabetic peripheral angiopathy  present.      Comments: Complains of sinus congestion and drainage despite using Flonase Atrovent added 3/30 he thinks it may be helping     Mouth/Throat:      Mouth: Mucous membranes are moist.   Eyes:      Extraocular Movements: Extraocular movements intact.      Pupils: Pupils are equal, round, and reactive to light.   Cardiovascular:      Rate and Rhythm: Normal rate and regular rhythm.      Pulses: Normal pulses.      Heart sounds: Normal heart sounds.   Pulmonary:      Effort: Pulmonary effort is normal.      Breath sounds: Rales present.   Abdominal:      General: Bowel sounds are normal.      Palpations: Abdomen is soft.      Comments: Obese soft normal bowel sounds   Musculoskeletal:         General: Swelling present.      Cervical back: Normal range of motion and neck supple.      Left lower leg: Edema present.   Neurological:      General: No focal deficit present.      Mental Status: He is alert.          Labs:    Recent Labs     04/02/24  0637   WBC 10.0   HGB 9.7*          Recent Labs     03/31/24  0704 04/01/24  1118 04/02/24  0637 04/02/24  0640    141 138 138   K 3.6 3.7 4.0 4.0    104 102 101   CO2 31 32 33* 33*   GLUCOSE 182* 170* 132* 133*   BUN 46* 42* 52* 51*   CREATININE 1.66* 2.03* 3.21* 3.28*   CALCIUM 9.1 9.2 9.0 9.1   PHOS 2.4* 2.4*  --  3.8   LABALBU 2.9* 2.6* 2.6* 2.7*   BILITOT  --   --  0.8  --    AST  --   --  45*  --    ALT  --   --  37  --        Recent Labs     04/01/24  1118 04/02/24  0637   NTPROBNP 1,276* 1,113*       Lab Results   Component Value Date/Time     04/19/2023 04:41 PM    NTPROBNP 1,113 04/02/2024 06:37 AM          Results       Procedure Component Value Units Date/Time    Culture, Urine [[id number]] Collected: 03/27/24 1752    Order Status: Completed Specimen: Urine Updated: 03/28/24 4187     Special Requests --        No Special Requests  Reflexed from [id number]       Chino count 12,000        Chino count colonies/ml        Culture

## 2024-04-02 NOTE — PLAN OF CARE
Problem: Discharge Planning  Goal: Discharge to home or other facility with appropriate resources  4/2/2024 1309 by Malcolm Bear, RN  Outcome: Progressing  4/2/2024 1309 by Malcolm Bear, RN  Outcome: Progressing     Problem: Pain  Goal: Verbalizes/displays adequate comfort level or baseline comfort level  Outcome: Progressing     Problem: Chronic Conditions and Co-morbidities  Goal: Patient's chronic conditions and co-morbidity symptoms are monitored and maintained or improved  Outcome: Progressing     Problem: Safety - Adult  Goal: Free from fall injury  Outcome: Progressing     Problem: Skin/Tissue Integrity  Goal: Absence of new skin breakdown  Description: 1.  Monitor for areas of redness and/or skin breakdown  2.  Assess vascular access sites hourly  3.  Every 4-6 hours minimum:  Change oxygen saturation probe site  4.  Every 4-6 hours:  If on nasal continuous positive airway pressure, respiratory therapy assess nares and determine need for appliance change or resting period.  Outcome: Progressing     Problem: ABCDS Injury Assessment  Goal: Absence of physical injury  Outcome: Progressing

## 2024-04-02 NOTE — CARE COORDINATION
CM has reviewed pt chart     DCP: return to [facility] (short term); from home with wife          Transition of Care Plan:    RUR: 21%  Prior Level of Functioning: SNF  Disposition: SNF at [facility]   If SNF or IPR: Date FOC offered: 3/28/24  Date FOC received: 3/28/24  Accepting facility: [facility]  Date authorization started with reference number: n/a  Date authorization received and expires: n/a  Follow up appointments: unit secretary to setup as needed  DME needed: none  Transportation at discharge: stretcher transport will be needed  IM/IMM Medicare/ letter given: yes  Is patient a  and connected with [facility]? N/a  If yes, was [place] transfer form completed and [facility] notified? N/a  Caregiver Contact: n/a  Discharge Caregiver contacted prior to discharge? N/a  Care Conference needed? no  Barriers to discharge: none    Primary nurse can call report to [facility] at [phone], pt going to room 114.

## 2024-04-02 NOTE — DISCHARGE INSTR - COC
Continuity of Care Form    Patient Name: Shantanu Casillas   :  1955  MRN:  [id number]    Admit date:  3/27/2024  Discharge date:  2024    Code Status Order: Full Code   Advance Directives:     Admitting Physician:  Ed Pino MD  PCP: Ed Pino MD    Discharging Nurse: Malcolm Bear  Discharging Hospital Unit/Room#: 563/01  Discharging Unit Phone Number: [phone]     Emergency Contact:   Extended Emergency Contact Information  Primary Emergency Contact: Migdalia Casillas  Home Phone: [phone]  Relation: Spouse    Past Surgical History:  Past Surgical History:   Procedure Laterality Date    FOOT DEBRIDEMENT Right 12/15/2023    INCISION AND DRAINAGE RIGHT FIRST METATARSAL PHALANGEAL JOINT performed by Polo Oviedo DPM at [facility] MAIN OR    FOOT SURGERY      right heel - foriegn object    HX VEIN ABLATION ADHESIVE      ORTHOPEDIC SURGERY      back surgery    PACEMAKER      aicd -       Immunization History:   Immunization History   Administered Date(s) Administered    COVID-19, MODERNA Bivalent, (age 12y+), IM, 50 mcg/0.5 mL 2022    COVID-19, PFIZER PURPLE top, DILUTE for use, (age 12 y+), 30mcg/0.3mL 2021, 2021, 2021, 10/21/2021    Influenza, FLUAD, (age 65 y+), Adjuvanted, 0.5mL 2020    Influenza, FLUZONE (age 65 y+), High Dose, 0.7mL 2023    Pneumococcal, PCV-13, PREVNAR 13, (age 6w+), IM, 0.5mL 2020    Pneumococcal, PCV20, PREVNAR 20, (age 6w+), IM, 0.5mL 2023       Active Problems:  Patient Active Problem List   Diagnosis Code    Onychomycosis B35.1    Non-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 (formerly [facility]) L97.822    Idiopathic chronic venous hypertension of left lower extremity with ulcer (formerly [facility]) I87.312, L97.929    Ulcer of left foot, with fat layer exposed (formerly [facility]) L97.522    Cellulitis L03.90    COVID-19 U07.1    Venous ulcer (HCC) I83.009, L97.909    Hyperkeratosis L85.9    Localized edema R60.0    Type 2  Catheter: None   Colostomy/Ileostomy/Ileal Conduit: No       Date of Last BM: ***    Intake/Output Summary (Last 24 hours) at 2024 1505  Last data filed at 2024 0619  Gross per 24 hour   Intake 640 ml   Output 420 ml   Net 220 ml     I/O last 3 completed shifts:  In: 1290 [P.O.:1290]  Out: 920 [Urine:920]    Safety Concerns:     At Risk for Falls    Impairments/Disabilities:      None    Nutrition Therapy:  Current Nutrition Therapy:   - Oral Diet:  Carb Control 3 carbs/meal (1500kcals/day)    Routes of Feeding: Oral  Liquids: No Restrictions  Daily Fluid Restriction: yes - amount 1500  Last Modified Barium Swallow with Video (Video Swallowing Test): not done    Treatments at the Time of Hospital Discharge:   Respiratory Treatments: ***  Oxygen Therapy:  is not on home oxygen therapy.  Ventilator:    - No ventilator support    Rehab Therapies: Physical Therapy and Occupational Therapy  Weight Bearing Status/Restrictions: {[facility] Weight Bearin}  Other Medical Equipment (for information only, NOT a DME order):  {EQUIPMENT:[id number]}  Other Treatments: ***    Patient's personal belongings (please select all that are sent with patient):  {[facility] DME Belongings:[id number]}    RN SIGNATURE:  Electronically signed by Malcolm Bear RN on 24 at 3:09 PM EDT

## 2024-04-03 NOTE — PROGRESS NOTES
Nephrology follow up          Patient: Shantanu Casillas MRN: [id number]  SSN: [id number]    YOB: 1955  Age: 68 y.o.  Sex: male      Subjective:   Pt is seen at the bedside  Bps are low  Good uop  Off IV fluids due to shortness of breath  Cr has bumped to 3.2    Past Medical History:   Diagnosis Date    Arthritis     CAD (coronary artery disease)     Chronic obstructive pulmonary disease (HCC)     Diabetes (HCC)     Gout     Hypertension     Ill-defined condition     Sleep apnea     cpap     Past Surgical History:   Procedure Laterality Date    FOOT DEBRIDEMENT Right 12/15/2023    INCISION AND DRAINAGE RIGHT FIRST METATARSAL PHALANGEAL JOINT performed by Polo Oviedo DPM at [facility] MAIN OR    FOOT SURGERY      right heel - foriegn object    HX VEIN ABLATION ADHESIVE      ORTHOPEDIC SURGERY      back surgery    PACEMAKER      aicd -      Family History   Problem Relation Age of Onset    Diabetes Mother     Heart Disease Father     Hypertension Father     Diabetes Sister     Diabetes Brother     Hypertension Mother     Heart Disease Mother     Kidney Disease Mother      Social History     Tobacco Use    Smoking status: Never    Smokeless tobacco: Never   Substance Use Topics    Alcohol use: Not Currently      No current facility-administered medications for this encounter.     Current Outpatient Medications   Medication Sig Dispense Refill    ipratropium (ATROVENT) 0.03 % nasal spray 2 sprays by Each Nostril route 3 times daily 30 mL 3    [START ON 4/3/2024] pantoprazole (PROTONIX) 40 MG tablet Take 1 tablet by mouth every morning (before breakfast) 30 tablet 3    oxyCODONE (ROXICODONE) 5 MG immediate release tablet Take 2 tablets by mouth 4 times daily. Max Daily Amount: 40 mg      insulin glargine (LANTUS) 100 UNIT/ML injection vial Inject 34 Units into the skin nightly 10 mL 3    gabapentin (NEURONTIN) 400 MG capsule Take 1 capsule by mouth 4 times daily.      allopurinol (ZYLOPRIM) 100 MG

## 2024-04-10 ENCOUNTER — TELEPHONE (OUTPATIENT)
Age: 69
End: 2024-04-10

## 2024-04-10 NOTE — TELEPHONE ENCOUNTER
Spoke with [facility] and they have received all the paperwork they need. Their  is out of the office but will reach out to pt to get him scheduled as soon as she gets back. Lvm for pts wife to let her know this information as well.

## 2024-04-12 NOTE — PROGRESS NOTES
Physician Progress Note      PATIENT:               JEFFY REED  [facility] #:                  [id number]  :                       1955  ADMIT DATE:       3/27/2024 4:07 PM  DISCH DATE:        2024 5:00 PM  RESPONDING  PROVIDER #:        Orion Hameed MD          QUERY TEXT:    Patient admitted with Acute kidney injury. Noted documentation of acute   respiratory failure in Pulmonology Consult . In order to support the   diagnosis of acute respiratory failure, please include additional clinical   indicators in your documentation.  Or please document if the diagnosis of   acute respiratory failure has been ruled out after further study.    The medical record reflects the following:  Risk Factors: COPD, CHF  Clinical Indicators: Pulmonology Consult  Acute on chronic respiratory   failure with Hypoxia,  Positive for shortness of breath and wheezing.   Pulmonology consult  No distress no accessory muscle use shallow   respirations but clear no wheezing  Spo2-94%,95%,97%, RR 25  Treatment: 4L NC, Spiriva and Symbicort, Pulmonology consulted    Acute Respiratory Failure Clinical Indicators per [employer] Training Guide and   Quick Reference Guide:  pO2 < 60 mmHg or SpO2 (pulse oximetry) < 91% breathing room air  pCO2 > 50 and pH < 7.35  P/F ratio (pO2 / FIO2) < 300  pO2 decrease or pCO2 increase by 10 mmHg from baseline (if known)  Supplemental oxygen of 40% or more  Presence of respiratory distress, tachypnea, dyspnea, shortness of breath,   wheezing  Unable to speak in complete sentences  Use of accessory muscles to breathe  Extreme anxiety and feeling of impending doom  Tripod position  Confusion/altered mental status/obtunded  Options provided:  -- Acute Respiratory Failure as evidenced by, Please document evidence.  -- Acute Respiratory Failure ruled out after study and Chronic Respiratory   Failure confirmed  -- Other - I will add my own diagnosis  -- Disagree - Not applicable / Not  valid  -- Disagree - Clinically unable to determine / Unknown  -- Refer to Clinical Documentation Reviewer    PROVIDER RESPONSE TEXT:    This patient is in acute respiratory failure as evidenced by wheezing,   shortness of breath, and worsening edema noted on Dr. Fraire's consult with   chronic hypoxia on baseline nasal oxygen 3 l with chronic noninvasive   ventilator use. Required nightly BiPAP on admission. With worsening   respiratory status (wheezing and complaints of shortness of breath) he should   be considered acute respiratory failure    Query created by: Dominga Molina on 4/2/2024 7:29 AM      Electronically signed by:  Orion Hameed MD 4/12/2024 8:23 AM

## 2024-06-20 ENCOUNTER — HOSPITAL ENCOUNTER (OUTPATIENT)
Facility: HOSPITAL | Age: 69
Discharge: HOME | End: 2024-06-20
Attending: PODIATRIST
Payer: MEDICARE

## 2024-06-20 VITALS
DIASTOLIC BLOOD PRESSURE: 54 MMHG | WEIGHT: 315 LBS | RESPIRATION RATE: 20 BRPM | TEMPERATURE: 98 F | BODY MASS INDEX: 44.1 KG/M2 | SYSTOLIC BLOOD PRESSURE: 85 MMHG | HEIGHT: 71 IN | HEART RATE: 125 BPM

## 2024-06-20 PROCEDURE — 11042 DBRDMT SUBQ TIS 1ST 20SQCM/<: CPT

## 2024-06-20 PROCEDURE — 11045 DBRDMT SUBQ TISS EACH ADDL: CPT

## 2024-06-20 PROCEDURE — 99215 OFFICE O/P EST HI 40 MIN: CPT

## 2024-06-20 RX ORDER — POTASSIUM CHLORIDE 1.5 G/1.58G
40 POWDER, FOR SOLUTION ORAL 2 TIMES DAILY
COMMUNITY

## 2024-06-20 RX ORDER — PREDNISONE 20 MG/1
20 TABLET ORAL DAILY
COMMUNITY

## 2024-06-20 RX ORDER — FERROUS SULFATE 325(65) MG
325 TABLET ORAL
COMMUNITY

## 2024-06-20 ASSESSMENT — PAIN DESCRIPTION - DESCRIPTORS: DESCRIPTORS: ACHING

## 2024-06-20 ASSESSMENT — PAIN DESCRIPTION - LOCATION: LOCATION: LEG

## 2024-06-20 ASSESSMENT — PAIN DESCRIPTION - ORIENTATION: ORIENTATION: RIGHT;LEFT

## 2024-06-20 ASSESSMENT — PAIN SCALES - GENERAL: PAINLEVEL_OUTOF10: 7

## 2024-06-20 NOTE — WOUND CARE
Multilayer Compression Wrap   (Not Unna) Below the Knee    NAME:  Shantanu Casillas  YOB: 1955  MEDICAL RECORD NUMBER:  [id number]  DATE:  6/20/2024    Multilayer compression wrap: Removed old Multilayer wrap if indicated and wash leg with mild soap/water.  Applied moisturizing agent to dry skin as needed.   Applied primary and secondary dressing as ordered.  Applied multilayered dressing below the knee to right lower leg.  Applied multilayered dressing below the knee to left lower leg.  Instructed patient/caregiver not to remove dressing and to keep it clean and dry.   Instructed patient/caregiver on complications to report to provider, such as pain, numbness in toes, heavy drainage, and slippage of dressing.  Instructed patient on purpose of compression dressing and on activity and exercise recommendations.      Electronically signed by Chiara Damon LPN on 6/20/2024 at 2:17 PM  
Stocking:  mmHg  []Right Leg []Left Leg   [] Tubigrip []Right Leg Double Layer []Left Leg Double Layer      []Right Leg Single Layer []Left Leg Single Layer      [] Elevate leg(s) above the level of the heart when sitting.    [] Avoid prolonged standing in one place.     Compression:  Apply: [x] Compression Wrap to [x]RightLeg [x]Left Leg    []  Coflex [x] Coflex Lite  [] Unna with Calamine     [x] Do not get leg(s) with wrap wet. If wrap becomes too tight, call the wound center and remove wrap from leg by unrolling each layer.   [x] Elevate leg(s) above the level of the heart when sitting.    [x] Avoid prolonged standing in one place.    Off-Loading:   [] Off-loading when: [] walking  [] in bed [] sitting  [] Total non-weight bearing  [] Right Leg  [] Left Leg   [] Assistive Device [] Walker [] Cane  [] Wheelchair  [] Crutches   [] Surgical shoe    [] Wedge Shoe  [] Foam Boot(s)  [] Knee Scooter    [] Cast Boot [] CROW Boot     [] Diabetic Shoes    [] Offloading heel boot  [] Other:     Dietary:  [] Diet as tolerated [x] Diabetic Diet [] No Added Salt  [x] Increase Protein [] Other:     Activity:  [x] Activity as tolerated  [] Patient has no activity restrictions      [] Strict Bedrest   [] Remain off Work:       [] May return to full duty work                                    [] Return to work with restrictions:     Physician:  [] Dr. Drea Moore  [] Dr. Carol Green   [] Dr. Martin Zheng  [] Orion Garces PA-C  [] Dr. Wei Saenz  [x] Dr. Polo Oviedo  [] Dr. Rosalee Toney    Nurse Case Manger:  Nallely      Wound Care Center Information: Should you experience any significant changes in your wound(s) or have questions about your wound care, please contact the Wound Center at [phone]. Our hours are Monday - Friday 8am - 4:30pm, closed all major holidays. If you need help with your wound outside these hours and cannot wait until we are again available, contact your PCP or go to the hospital

## 2024-06-20 NOTE — DISCHARGE INSTRUCTIONS
Wound Clinic Physician Orders and Discharge Instructions  [facility]  [address], [address]  [place] [address]  Telephone: [phone]     FAX [phone]    NAME:  Shantanu Casillas  YOB: 1955  MEDICAL RECORD NUMBER:  [id number]  DATE:  6/20/2024      Return Appointment:    [] Dressing Supply Provider:   [x] Home Healthcare: [facility]  [x] Return Appointment: 1 Week(s)  [] Nurse Visit:     [] Discharge from [facility]: [] Healed        [] Refer to Provider:         [] Consult    Follow-up Information:    [] Ordered Tests:  [] Vascular/Arterial Testing [] Imaging (X-ray, MRI, or CT)   [] Please call [phone] to schedule any testing    [] Referrals:    [] Infectious Disease  [] Vascular  [] Other:    [] Rx to pharmacy:   [] Would Culture obtained in clinic  [] Other:       Wound Cleansing:   Do not scrub or use excessive force.  Cleanse wound prior to applying a clean dressing with:    [] Normal Saline   [x] Mild Soap & Water on dressing change days     [x] Keep Wound Dry in Shower  [] Wear a cast cover to shower  [] Other:       Topical Treatments:  Do not apply lotions, creams, or ointments to wound bed unless directed.     [x] Apply moisturizing lotion to skin surrounding the wound prior to dressing change.  [] Apply antifungal ointment to skin surrounding the wound prior to dressing change.  [] Apply thin film of moisture barrier ointment (Zinc) to skin immediately around wound.  [] Apply Betadine to skin immediately around wound   [] Apply Skin Prep to skin immediately around wound  [] Other:      Dressings:           Wound Location R Leg, L Leg  [x] Apply Primary Dressing:       [] MediHoney Gel [] MediHoney Alginate  [] Calcium Alginate with Silver   [] Calcium Alginate without silver   [] Collagen with silver [] Collagen without Silver    [] Santyl with moist saline gauze     [x] Hydrofera Blue (cut to size and moistened with normal saline)  []

## 2024-06-23 PROBLEM — L97.912 ULCER OF RIGHT LEG, WITH FAT LAYER EXPOSED (HCC): Status: ACTIVE | Noted: 2022-04-06

## 2024-06-23 PROBLEM — L97.922 CHRONIC ULCER OF LEFT LEG WITH FAT LAYER EXPOSED (HCC): Status: ACTIVE | Noted: 2022-03-02

## 2024-06-27 ENCOUNTER — HOSPITAL ENCOUNTER (OUTPATIENT)
Facility: HOSPITAL | Age: 69
Discharge: HOME | End: 2024-06-27
Attending: PODIATRIST
Payer: MEDICARE

## 2024-06-27 VITALS
SYSTOLIC BLOOD PRESSURE: 96 MMHG | TEMPERATURE: 97.3 F | RESPIRATION RATE: 19 BRPM | DIASTOLIC BLOOD PRESSURE: 56 MMHG | HEART RATE: 119 BPM

## 2024-06-27 PROCEDURE — 11045 DBRDMT SUBQ TISS EACH ADDL: CPT

## 2024-06-27 PROCEDURE — 87205 SMEAR GRAM STAIN: CPT

## 2024-06-27 PROCEDURE — 87070 CULTURE OTHR SPECIMN AEROBIC: CPT

## 2024-06-27 PROCEDURE — 11042 DBRDMT SUBQ TIS 1ST 20SQCM/<: CPT

## 2024-06-27 RX ORDER — AMOXICILLIN AND CLAVULANATE POTASSIUM 875; 125 MG/1; MG/1
1 TABLET, FILM COATED ORAL 2 TIMES DAILY
Qty: 20 TABLET | Refills: 0 | Status: SHIPPED | OUTPATIENT
Start: 2024-06-27 | End: 2024-07-07

## 2024-06-27 ASSESSMENT — PAIN DESCRIPTION - LOCATION: LOCATION: LEG

## 2024-06-27 ASSESSMENT — PAIN SCALES - GENERAL: PAINLEVEL_OUTOF10: 8

## 2024-06-27 ASSESSMENT — PAIN DESCRIPTION - DESCRIPTORS: DESCRIPTORS: ACHING

## 2024-06-27 ASSESSMENT — PAIN DESCRIPTION - ORIENTATION: ORIENTATION: RIGHT;LEFT

## 2024-06-27 NOTE — DISCHARGE INSTRUCTIONS
hospital emergency room.     PLEASE NOTE: IF YOU ARE UNABLE TO OBTAIN WOUND SUPPLIES, CONTINUE TO USE THE SUPPLIES YOU HAVE AVAILABLE UNTIL YOU ARE ABLE TO REACH US. IT IS MOST IMPORTANT TO KEEP THE WOUND COVERED AT ALL TIMES.

## 2024-06-27 NOTE — WOUND CARE
Multilayer Compression Wrap   (Not Unna) Below the Knee    NAME:  Shantanu Casillas  YOB: 1955  MEDICAL RECORD NUMBER:  [id number]  DATE:  6/27/2024    Multilayer compression wrap: Removed old Multilayer wrap if indicated and wash leg with mild soap/water.  Applied moisturizing agent to dry skin as needed.   Applied primary and secondary dressing as ordered.  Applied multilayered dressing below the knee to right lower leg.  Applied multilayered dressing below the knee to left lower leg.  Instructed patient/caregiver not to remove dressing and to keep it clean and dry.   Instructed patient/caregiver on complications to report to provider, such as pain, numbness in toes, heavy drainage, and slippage of dressing.  Instructed patient on purpose of compression dressing and on activity and exercise recommendations.      Electronically signed by Chiara Damon LPN on 6/27/2024 at 3:25 PM  
Compression Stocking:  mmHg  []Right Leg []Left Leg   [] Tubigrip []Right Leg Double Layer []Left Leg Double Layer      []Right Leg Single Layer []Left Leg Single Layer      [] Elevate leg(s) above the level of the heart when sitting.    [] Avoid prolonged standing in one place.     Compression:  Apply: [x] Compression Wrap to [x]RightLeg [x]Left Leg    []  Coflex [x] Coflex Lite  [] Unna with Calamine     [x] Do not get leg(s) with wrap wet. If wrap becomes too tight, call the wound center and remove wrap from leg by unrolling each layer.   [x] Elevate leg(s) above the level of the heart when sitting.    [x] Avoid prolonged standing in one place.    Off-Loading:   [] Off-loading when: [] walking  [] in bed [] sitting  [] Total non-weight bearing  [] Right Leg  [] Left Leg   [] Assistive Device [] Walker [] Cane  [] Wheelchair  [] Crutches   [] Surgical shoe    [] Wedge Shoe  [] Foam Boot(s)  [] Knee Scooter    [] Cast Boot [] CROW Boot     [] Diabetic Shoes    [] Offloading heel boot  [] Other:     Dietary:  [] Diet as tolerated [x] Diabetic Diet [] No Added Salt  [x] Increase Protein [] Other:     Activity:  [x] Activity as tolerated  [] Patient has no activity restrictions      [] Strict Bedrest   [] Remain off Work:       [] May return to full duty work                                    [] Return to work with restrictions:     Physician:  [] Dr. Drea Moore  [] Dr. Carol Green   [] Dr. Martin Zheng  [] Orion Garces PA-C  [] Dr. Wei Saenz  [x] Dr. Polo Oviedo  [] Dr. Rosalee Toney    Nurse Case Manger:  Nallely      Wound Care Center Information: Should you experience any significant changes in your wound(s) or have questions about your wound care, please contact the Wound Center at [phone]. Our hours are Monday - Friday 8am - 4:30pm, closed all major holidays. If you need help with your wound outside these hours and cannot wait until we are again available, contact your PCP or go to the

## 2024-06-30 LAB
BACTERIA SPEC CULT: ABNORMAL
BACTERIA SPEC CULT: ABNORMAL
GRAM STN SPEC: ABNORMAL
Lab: ABNORMAL

## 2024-07-02 LAB
BACTERIA SPEC CULT: ABNORMAL
BACTERIA SPEC CULT: ABNORMAL
GRAM STN SPEC: ABNORMAL
Lab: ABNORMAL

## 2024-07-11 ENCOUNTER — HOSPITAL ENCOUNTER (OUTPATIENT)
Facility: HOSPITAL | Age: 69
Discharge: HOME | End: 2024-07-11
Attending: PODIATRIST
Payer: MEDICARE

## 2024-07-11 VITALS — DIASTOLIC BLOOD PRESSURE: 72 MMHG | HEART RATE: 112 BPM | TEMPERATURE: 98 F | SYSTOLIC BLOOD PRESSURE: 129 MMHG

## 2024-07-11 PROCEDURE — 11042 DBRDMT SUBQ TIS 1ST 20SQCM/<: CPT

## 2024-07-11 PROCEDURE — 11045 DBRDMT SUBQ TISS EACH ADDL: CPT

## 2024-07-11 RX ORDER — CIPROFLOXACIN 500 MG/1
500 TABLET, FILM COATED ORAL 2 TIMES DAILY
Qty: 20 TABLET | Refills: 0 | Status: SHIPPED | OUTPATIENT
Start: 2024-07-11 | End: 2024-07-21

## 2024-07-11 ASSESSMENT — PAIN SCALES - GENERAL: PAINLEVEL_OUTOF10: 10

## 2024-07-11 NOTE — WOUND CARE
Wound Clinic Physician Orders and Discharge Instructions  [facility]  [address], [address]  [place] [address]  Telephone: [phone]     FAX [phone]    NAME:  Shantanu Casillas  YOB: 1955  MEDICAL RECORD NUMBER:  [id number]  DATE:  7/11/2024      Return Appointment:    [] Dressing Supply Provider:   [x] Home Healthcare: [facility] (please make sure patient is in a Coflex Lite compression, not unna boot)  [x] Return Appointment: 1 Week(s)  [] Nurse Visit:     [] Discharge from [facility]: [] Healed        [] Refer to Provider:         [] Consult    Follow-up Information:    [] Ordered Tests:  [] Vascular/Arterial Testing [] Imaging (X-ray, MRI, or CT)   [] Please call [phone] to schedule any testing    [] Referrals:    [] Infectious Disease  [] Vascular  [] Other:    [x] Rx to pharmacy: Cipro to pharmacy  [] Would Culture obtained in clinic   [] Other:       Wound Cleansing:   Do not scrub or use excessive force.  Cleanse wound prior to applying a clean dressing with:    [] Normal Saline   [x] Mild Soap & Water on dressing change days     [x] Keep Wound Dry in Shower  [] Wear a cast cover to shower  [] Other:       Topical Treatments:  Do not apply lotions, creams, or ointments to wound bed unless directed.     [x] Apply moisturizing lotion to skin surrounding the wound prior to dressing change.  [] Apply antifungal ointment to skin surrounding the wound prior to dressing change.  [] Apply thin film of moisture barrier ointment (Zinc) to skin immediately around wound.  [] Apply Betadine to skin immediately around wound   [] Apply Skin Prep to skin immediately around wound  [] Other:      Dressings:           Wound Location R Leg (medial and lateral), L Leg (lateral)  [x] Apply Primary Dressing:       [] MediHoney Gel [] MediHoney Alginate  [] Calcium Alginate with Silver   [] Calcium Alginate without silver   [] Collagen with silver [] Collagen without

## 2024-07-11 NOTE — WOUND CARE
Multilayer Compression Wrap   (Not Unna) Below the Knee    NAME:  Shantanu Casillas  YOB: 1955  MEDICAL RECORD NUMBER:  [id number]  DATE:  7/11/2024    Multilayer compression wrap: Removed old Multilayer wrap if indicated and wash leg with mild soap/water.  Applied moisturizing agent to dry skin as needed.   Applied primary and secondary dressing as ordered.  Applied multilayered dressing below the knee to right lower leg.  Applied multilayered dressing below the knee to left lower leg.  Instructed patient/caregiver not to remove dressing and to keep it clean and dry.   Instructed patient/caregiver on complications to report to provider, such as pain, numbness in toes, heavy drainage, and slippage of dressing.  Instructed patient on purpose of compression dressing and on activity and exercise recommendations.      Electronically signed by Chiara Damon LPN on 7/11/2024 at 3:40 PM

## 2024-07-11 NOTE — DISCHARGE INSTRUCTIONS
to 30 degree tilt.  Limit HOB elevation to 30 degrees.  [] Other     Edema Control:  Apply: [] Compression Stocking:  mmHg  []Right Leg []Left Leg   [] Tubigrip []Right Leg Double Layer []Left Leg Double Layer      []Right Leg Single Layer []Left Leg Single Layer      [] Elevate leg(s) above the level of the heart when sitting.    [] Avoid prolonged standing in one place.     Compression:  Apply: [x] Compression Wrap to [x]RightLeg [x]Left Leg    []  Coflex [x] Coflex Lite  [] Unna with Calamine     [x] Do not get leg(s) with wrap wet. If wrap becomes too tight, call the wound center and remove wrap from leg by unrolling each layer.   [x] Elevate leg(s) above the level of the heart when sitting.    [x] Avoid prolonged standing in one place.    Off-Loading:   [] Off-loading when: [] walking  [] in bed [] sitting  [] Total non-weight bearing  [] Right Leg  [] Left Leg   [] Assistive Device [] Walker [] Cane  [] Wheelchair  [] Crutches   [] Surgical shoe    [] Wedge Shoe  [] Foam Boot(s)  [] Knee Scooter    [] Cast Boot [] CROW Boot     [] Diabetic Shoes    [] Offloading heel boot  [] Other:     Dietary:  [] Diet as tolerated [x] Diabetic Diet [] No Added Salt  [x] Increase Protein [] Other:     Activity:  [x] Activity as tolerated  [] Patient has no activity restrictions      [] Strict Bedrest   [] Remain off Work:       [] May return to full duty work                                    [] Return to work with restrictions:     Physician:  [] Dr. Drea Moore  [] Dr. Carol Green   [] Dr. Martin Zheng  [] Orion Garces PA-C  [] Dr. Wei Saenz  [x] Dr. Polo Oviedo  [] Dr. Rosalee Toney    Nurse Case Manger:  Nallely      Wound Care Center Information: Should you experience any significant changes in your wound(s) or have questions about your wound care, please contact the Wound Center at [phone]. Our hours are Monday - Friday 8am - 4:30pm, closed all major holidays. If you need help with your wound

## 2024-07-19 PROBLEM — L97.512 ULCER OF RIGHT FOOT WITH FAT LAYER EXPOSED (HCC): Status: ACTIVE | Noted: 2024-07-19

## 2024-07-25 ENCOUNTER — HOSPITAL ENCOUNTER (INPATIENT)
Facility: HOSPITAL | Age: 69
LOS: 5 days | Discharge: HOME HEALTH | DRG: 602 | End: 2024-07-30
Attending: EMERGENCY MEDICINE | Admitting: FAMILY MEDICINE
Payer: MEDICARE

## 2024-07-25 ENCOUNTER — APPOINTMENT (OUTPATIENT)
Facility: HOSPITAL | Age: 69
DRG: 602 | End: 2024-07-25
Attending: EMERGENCY MEDICINE
Payer: MEDICARE

## 2024-07-25 ENCOUNTER — APPOINTMENT (OUTPATIENT)
Facility: HOSPITAL | Age: 69
DRG: 602 | End: 2024-07-25
Payer: MEDICARE

## 2024-07-25 DIAGNOSIS — L03.119 CELLULITIS OF LOWER EXTREMITY, UNSPECIFIED LATERALITY: ICD-10-CM

## 2024-07-25 DIAGNOSIS — T73.3XXA FATIGUE DUE TO EXCESSIVE EXERTION, INITIAL ENCOUNTER: Primary | ICD-10-CM

## 2024-07-25 LAB
ANION GAP SERPL CALC-SCNC: 12 MMOL/L (ref 5–15)
BASOPHILS # BLD: 0 K/UL (ref 0–0.1)
BASOPHILS NFR BLD: 0 % (ref 0–1)
BUN SERPL-MCNC: 86 MG/DL (ref 6–20)
BUN/CREAT SERPL: 32 (ref 12–20)
CA-I BLD-MCNC: 9.6 MG/DL (ref 8.5–10.1)
CHLORIDE SERPL-SCNC: 91 MMOL/L (ref 97–108)
CO2 SERPL-SCNC: 30 MMOL/L (ref 21–32)
CREAT SERPL-MCNC: 2.73 MG/DL (ref 0.7–1.3)
DIFFERENTIAL METHOD BLD: ABNORMAL
EKG ATRIAL RATE: 114 BPM
EKG DIAGNOSIS: NORMAL
EKG P-R INTERVAL: 170 MS
EKG Q-T INTERVAL: 342 MS
EKG QRS DURATION: 122 MS
EKG QTC CALCULATION (BAZETT): 471 MS
EKG R AXIS: 122 DEGREES
EKG T AXIS: -20 DEGREES
EKG VENTRICULAR RATE: 114 BPM
EOSINOPHIL # BLD: 0.2 K/UL (ref 0–0.4)
EOSINOPHIL NFR BLD: 2 % (ref 0–7)
ERYTHROCYTE [DISTWIDTH] IN BLOOD BY AUTOMATED COUNT: 17.8 % (ref 11.5–14.5)
GLUCOSE SERPL-MCNC: 270 MG/DL (ref 65–100)
HCT VFR BLD AUTO: 36.2 % (ref 36.6–50.3)
HGB BLD-MCNC: 11.6 G/DL (ref 12.1–17)
IMM GRANULOCYTES # BLD AUTO: 0.1 K/UL (ref 0–0.04)
IMM GRANULOCYTES NFR BLD AUTO: 1 % (ref 0–0.5)
LACTATE SERPL-SCNC: 5.2 MMOL/L (ref 0.4–2)
LYMPHOCYTES # BLD: 1.3 K/UL (ref 0.8–3.5)
LYMPHOCYTES NFR BLD: 12 % (ref 12–49)
MCH RBC QN AUTO: 28.9 PG (ref 26–34)
MCHC RBC AUTO-ENTMCNC: 32 G/DL (ref 30–36.5)
MCV RBC AUTO: 90.3 FL (ref 80–99)
MONOCYTES # BLD: 1.2 K/UL (ref 0–1)
MONOCYTES NFR BLD: 12 % (ref 5–13)
NEUTS SEG # BLD: 7.6 K/UL (ref 1.8–8)
NEUTS SEG NFR BLD: 73 % (ref 32–75)
NRBC # BLD: 0 K/UL (ref 0–0.01)
NRBC BLD-RTO: 0 PER 100 WBC
PLATELET # BLD AUTO: 268 K/UL (ref 150–400)
PMV BLD AUTO: 10.7 FL (ref 8.9–12.9)
POTASSIUM SERPL-SCNC: 3.3 MMOL/L (ref 3.5–5.1)
RBC # BLD AUTO: 4.01 M/UL (ref 4.1–5.7)
SODIUM SERPL-SCNC: 133 MMOL/L (ref 136–145)
TROPONIN I SERPL HS-MCNC: 30 NG/L (ref 0–76)
WBC # BLD AUTO: 10.4 K/UL (ref 4.1–11.1)

## 2024-07-25 PROCEDURE — 72125 CT NECK SPINE W/O DYE: CPT

## 2024-07-25 PROCEDURE — 71045 X-RAY EXAM CHEST 1 VIEW: CPT

## 2024-07-25 PROCEDURE — 84484 ASSAY OF TROPONIN QUANT: CPT

## 2024-07-25 PROCEDURE — 6370000000 HC RX 637 (ALT 250 FOR IP): Performed by: EMERGENCY MEDICINE

## 2024-07-25 PROCEDURE — 2580000003 HC RX 258: Performed by: EMERGENCY MEDICINE

## 2024-07-25 PROCEDURE — 1100000000 HC RM PRIVATE

## 2024-07-25 PROCEDURE — 85025 COMPLETE CBC W/AUTO DIFF WBC: CPT

## 2024-07-25 PROCEDURE — 99285 EMERGENCY DEPT VISIT HI MDM: CPT

## 2024-07-25 PROCEDURE — 36415 COLL VENOUS BLD VENIPUNCTURE: CPT

## 2024-07-25 PROCEDURE — 94762 N-INVAS EAR/PLS OXIMTRY CONT: CPT

## 2024-07-25 PROCEDURE — 93005 ELECTROCARDIOGRAM TRACING: CPT | Performed by: EMERGENCY MEDICINE

## 2024-07-25 PROCEDURE — 80048 BASIC METABOLIC PNL TOTAL CA: CPT

## 2024-07-25 PROCEDURE — 83605 ASSAY OF LACTIC ACID: CPT

## 2024-07-25 RX ORDER — HEPARIN SODIUM 5000 [USP'U]/ML
5000 INJECTION, SOLUTION INTRAVENOUS; SUBCUTANEOUS EVERY 8 HOURS SCHEDULED
Status: DISCONTINUED | OUTPATIENT
Start: 2024-07-26 | End: 2024-07-30 | Stop reason: HOSPADM

## 2024-07-25 RX ORDER — SODIUM CHLORIDE 0.9 % (FLUSH) 0.9 %
5-40 SYRINGE (ML) INJECTION EVERY 12 HOURS SCHEDULED
Status: DISCONTINUED | OUTPATIENT
Start: 2024-07-25 | End: 2024-07-30 | Stop reason: HOSPADM

## 2024-07-25 RX ORDER — SODIUM CHLORIDE 9 MG/ML
INJECTION, SOLUTION INTRAVENOUS PRN
Status: DISCONTINUED | OUTPATIENT
Start: 2024-07-25 | End: 2024-07-30 | Stop reason: HOSPADM

## 2024-07-25 RX ORDER — BUDESONIDE AND FORMOTEROL FUMARATE DIHYDRATE 160; 4.5 UG/1; UG/1
2 AEROSOL RESPIRATORY (INHALATION)
Status: DISCONTINUED | OUTPATIENT
Start: 2024-07-25 | End: 2024-07-30 | Stop reason: HOSPADM

## 2024-07-25 RX ORDER — ALLOPURINOL 100 MG/1
200 TABLET ORAL DAILY
Status: DISCONTINUED | OUTPATIENT
Start: 2024-07-26 | End: 2024-07-30 | Stop reason: HOSPADM

## 2024-07-25 RX ORDER — INSULIN GLARGINE 100 [IU]/ML
40 INJECTION, SOLUTION SUBCUTANEOUS 2 TIMES DAILY
Status: DISCONTINUED | OUTPATIENT
Start: 2024-07-25 | End: 2024-07-27

## 2024-07-25 RX ORDER — ONDANSETRON 4 MG/1
4 TABLET, ORALLY DISINTEGRATING ORAL EVERY 8 HOURS PRN
Status: DISCONTINUED | OUTPATIENT
Start: 2024-07-25 | End: 2024-07-30 | Stop reason: HOSPADM

## 2024-07-25 RX ORDER — GABAPENTIN 400 MG/1
400 CAPSULE ORAL 3 TIMES DAILY
Status: DISCONTINUED | OUTPATIENT
Start: 2024-07-25 | End: 2024-07-25

## 2024-07-25 RX ORDER — POTASSIUM CHLORIDE 750 MG/1
40 TABLET, FILM COATED, EXTENDED RELEASE ORAL ONCE
Status: COMPLETED | OUTPATIENT
Start: 2024-07-25 | End: 2024-07-26

## 2024-07-25 RX ORDER — ASPIRIN 81 MG/1
81 TABLET, CHEWABLE ORAL DAILY
Status: DISCONTINUED | OUTPATIENT
Start: 2024-07-26 | End: 2024-07-30 | Stop reason: HOSPADM

## 2024-07-25 RX ORDER — ACETAMINOPHEN 325 MG/1
650 TABLET ORAL EVERY 6 HOURS PRN
Status: DISCONTINUED | OUTPATIENT
Start: 2024-07-25 | End: 2024-07-28

## 2024-07-25 RX ORDER — DEXTROSE MONOHYDRATE 100 MG/ML
INJECTION, SOLUTION INTRAVENOUS CONTINUOUS PRN
Status: DISCONTINUED | OUTPATIENT
Start: 2024-07-25 | End: 2024-07-30 | Stop reason: HOSPADM

## 2024-07-25 RX ORDER — VITAMIN E 268 MG
400 CAPSULE ORAL 2 TIMES DAILY
Status: DISCONTINUED | OUTPATIENT
Start: 2024-07-26 | End: 2024-07-30 | Stop reason: HOSPADM

## 2024-07-25 RX ORDER — ALBUTEROL SULFATE 90 UG/1
1 AEROSOL, METERED RESPIRATORY (INHALATION) EVERY 4 HOURS PRN
Status: DISCONTINUED | OUTPATIENT
Start: 2024-07-25 | End: 2024-07-30 | Stop reason: HOSPADM

## 2024-07-25 RX ORDER — ONDANSETRON 2 MG/ML
4 INJECTION INTRAMUSCULAR; INTRAVENOUS EVERY 6 HOURS PRN
Status: DISCONTINUED | OUTPATIENT
Start: 2024-07-25 | End: 2024-07-30 | Stop reason: HOSPADM

## 2024-07-25 RX ORDER — ATORVASTATIN CALCIUM 40 MG/1
40 TABLET, FILM COATED ORAL NIGHTLY
Status: DISCONTINUED | OUTPATIENT
Start: 2024-07-25 | End: 2024-07-30 | Stop reason: HOSPADM

## 2024-07-25 RX ORDER — IPRATROPIUM BROMIDE 21 UG/1
2 SPRAY, METERED NASAL 3 TIMES DAILY
Status: DISCONTINUED | OUTPATIENT
Start: 2024-07-25 | End: 2024-07-26

## 2024-07-25 RX ORDER — FERROUS SULFATE 325(65) MG
325 TABLET ORAL
Status: DISCONTINUED | OUTPATIENT
Start: 2024-07-26 | End: 2024-07-30 | Stop reason: HOSPADM

## 2024-07-25 RX ORDER — INSULIN LISPRO 100 [IU]/ML
0-4 INJECTION, SOLUTION INTRAVENOUS; SUBCUTANEOUS NIGHTLY
Status: DISCONTINUED | OUTPATIENT
Start: 2024-07-25 | End: 2024-07-30 | Stop reason: HOSPADM

## 2024-07-25 RX ORDER — VITAMIN B COMPLEX
1 CAPSULE ORAL DAILY
Status: DISCONTINUED | OUTPATIENT
Start: 2024-07-26 | End: 2024-07-30 | Stop reason: HOSPADM

## 2024-07-25 RX ORDER — POTASSIUM CHLORIDE 20 MEQ/1
40 TABLET, EXTENDED RELEASE ORAL PRN
Status: DISCONTINUED | OUTPATIENT
Start: 2024-07-25 | End: 2024-07-30 | Stop reason: HOSPADM

## 2024-07-25 RX ORDER — PANTOPRAZOLE SODIUM 40 MG/1
40 TABLET, DELAYED RELEASE ORAL
Status: DISCONTINUED | OUTPATIENT
Start: 2024-07-26 | End: 2024-07-30 | Stop reason: HOSPADM

## 2024-07-25 RX ORDER — SODIUM CHLORIDE 9 MG/ML
INJECTION, SOLUTION INTRAVENOUS ONCE
Status: COMPLETED | OUTPATIENT
Start: 2024-07-25 | End: 2024-07-25

## 2024-07-25 RX ORDER — OXYCODONE HCL 10 MG/1
10 TABLET, FILM COATED, EXTENDED RELEASE ORAL EVERY 8 HOURS
Status: DISCONTINUED | OUTPATIENT
Start: 2024-07-25 | End: 2024-07-25 | Stop reason: CLARIF

## 2024-07-25 RX ORDER — POTASSIUM CHLORIDE 7.45 MG/ML
10 INJECTION INTRAVENOUS PRN
Status: DISCONTINUED | OUTPATIENT
Start: 2024-07-25 | End: 2024-07-30 | Stop reason: HOSPADM

## 2024-07-25 RX ORDER — GLUCAGON 1 MG/ML
1 KIT INJECTION PRN
Status: DISCONTINUED | OUTPATIENT
Start: 2024-07-25 | End: 2024-07-30 | Stop reason: HOSPADM

## 2024-07-25 RX ORDER — OXYCODONE HYDROCHLORIDE 10 MG/1
10 TABLET ORAL 4 TIMES DAILY
Status: DISCONTINUED | OUTPATIENT
Start: 2024-07-25 | End: 2024-07-30 | Stop reason: HOSPADM

## 2024-07-25 RX ORDER — INSULIN LISPRO 100 [IU]/ML
0-16 INJECTION, SOLUTION INTRAVENOUS; SUBCUTANEOUS
Status: DISCONTINUED | OUTPATIENT
Start: 2024-07-26 | End: 2024-07-30 | Stop reason: HOSPADM

## 2024-07-25 RX ORDER — OXYCODONE HYDROCHLORIDE 10 MG/1
10 TABLET ORAL
Status: COMPLETED | OUTPATIENT
Start: 2024-07-25 | End: 2024-07-25

## 2024-07-25 RX ORDER — IPRATROPIUM BROMIDE AND ALBUTEROL SULFATE 2.5; .5 MG/3ML; MG/3ML
1 SOLUTION RESPIRATORY (INHALATION)
Status: DISCONTINUED | OUTPATIENT
Start: 2024-07-26 | End: 2024-07-26

## 2024-07-25 RX ORDER — COLCHICINE 0.6 MG/1
0.6 TABLET ORAL DAILY
Status: DISCONTINUED | OUTPATIENT
Start: 2024-07-26 | End: 2024-07-30 | Stop reason: HOSPADM

## 2024-07-25 RX ORDER — MAGNESIUM SULFATE IN WATER 40 MG/ML
2000 INJECTION, SOLUTION INTRAVENOUS PRN
Status: DISCONTINUED | OUTPATIENT
Start: 2024-07-25 | End: 2024-07-30 | Stop reason: HOSPADM

## 2024-07-25 RX ORDER — BUMETANIDE 1 MG/1
2 TABLET ORAL 2 TIMES DAILY
Status: DISCONTINUED | OUTPATIENT
Start: 2024-07-26 | End: 2024-07-30 | Stop reason: HOSPADM

## 2024-07-25 RX ORDER — POLYETHYLENE GLYCOL 3350 17 G/17G
17 POWDER, FOR SOLUTION ORAL DAILY PRN
Status: DISCONTINUED | OUTPATIENT
Start: 2024-07-25 | End: 2024-07-30 | Stop reason: HOSPADM

## 2024-07-25 RX ORDER — ACETAMINOPHEN 650 MG/1
650 SUPPOSITORY RECTAL EVERY 6 HOURS PRN
Status: DISCONTINUED | OUTPATIENT
Start: 2024-07-25 | End: 2024-07-28

## 2024-07-25 RX ORDER — SODIUM CHLORIDE 0.9 % (FLUSH) 0.9 %
5-40 SYRINGE (ML) INJECTION PRN
Status: DISCONTINUED | OUTPATIENT
Start: 2024-07-25 | End: 2024-07-30 | Stop reason: HOSPADM

## 2024-07-25 RX ORDER — FLUTICASONE PROPIONATE 50 MCG
2 SPRAY, SUSPENSION (ML) NASAL 2 TIMES DAILY
Status: DISCONTINUED | OUTPATIENT
Start: 2024-07-26 | End: 2024-07-30 | Stop reason: HOSPADM

## 2024-07-25 RX ORDER — ASCORBIC ACID 500 MG
1000 TABLET ORAL 2 TIMES DAILY
Status: DISCONTINUED | OUTPATIENT
Start: 2024-07-25 | End: 2024-07-30 | Stop reason: HOSPADM

## 2024-07-25 RX ADMIN — OXYCODONE HYDROCHLORIDE 10 MG: 10 TABLET ORAL at 20:28

## 2024-07-25 RX ADMIN — SODIUM CHLORIDE: 9 INJECTION, SOLUTION INTRAVENOUS at 19:33

## 2024-07-25 RX ADMIN — GABAPENTIN 400 MG: 400 CAPSULE ORAL at 20:28

## 2024-07-25 ASSESSMENT — PAIN SCALES - GENERAL: PAINLEVEL_OUTOF10: 10

## 2024-07-25 ASSESSMENT — PAIN - FUNCTIONAL ASSESSMENT: PAIN_FUNCTIONAL_ASSESSMENT: 0-10

## 2024-07-25 NOTE — ED TRIAGE NOTES
Multiple complaints     #1 Patient had a fall last week after getting weak    #2 patients chair broke and he fell, has as wound on his leg that may be infected     #3 he can't keep his neck straight because it hurts     #4 feels like he has low urine output     #5 Dr Oviedo sent patient over     #6 he is cramping all over and causes him to tremor

## 2024-07-25 NOTE — ED PROVIDER NOTES
[facility] EMERGENCY DEPT  EMERGENCY DEPARTMENT HISTORY AND PHYSICAL EXAM      Date: 7/25/2024  Patient Name: Shantanu Casillas  MRN: [id number]  Birthdate 1955  Date of evaluation: 7/25/2024  Provider: Vitaliy Cruz MD   Note Started: 4:55 PM EDT 7/25/24    HISTORY OF PRESENT ILLNESS     Chief Complaint   Patient presents with    Fatigue    Fall       History Provided By: Patient    HPI: Shantanu Casillas is a 68 y.o. male with multiple comorbidities to include coronary disease, COPD, diabetes hypertension sleep apnea and morbid obesity with chronic bilateral lower extremity wounds presents with increased fatigue and increased number of falls over the past couple of days complaining of neck pain.  Patient says he has been on steroids for the past 10 months and stopped immediately a week or so ago.  Says he just feels fatigued.    PAST MEDICAL HISTORY   Past Medical History:  Past Medical History:   Diagnosis Date    Arthritis     CAD (coronary artery disease)     Chronic obstructive pulmonary disease (HCC)     Diabetes (HCC)     Gout     Hypertension     Ill-defined condition     Sleep apnea     cpap       Past Surgical History:  Past Surgical History:   Procedure Laterality Date    FOOT DEBRIDEMENT Right 12/15/2023    INCISION AND DRAINAGE RIGHT FIRST METATARSAL PHALANGEAL JOINT performed by Polo Oviedo DPM at [facility] MAIN OR    FOOT SURGERY      right heel - foriegn object    HX VEIN ABLATION ADHESIVE      ORTHOPEDIC SURGERY      back surgery    PACEMAKER      aicd -       Family History:  Family History   Problem Relation Age of Onset    Diabetes Mother     Heart Disease Father     Hypertension Father     Diabetes Sister     Diabetes Brother     Hypertension Mother     Heart Disease Mother     Kidney Disease Mother        Social History:  Social History     Tobacco Use    Smoking status: Never    Smokeless tobacco: Never   Substance Use Topics    Alcohol use: Not Currently    Drug use: Never

## 2024-07-26 ENCOUNTER — APPOINTMENT (OUTPATIENT)
Facility: HOSPITAL | Age: 69
DRG: 602 | End: 2024-07-26
Payer: MEDICARE

## 2024-07-26 LAB
ALBUMIN SERPL-MCNC: 2.6 G/DL (ref 3.5–5)
ALBUMIN SERPL-MCNC: 2.9 G/DL (ref 3.5–5)
ALBUMIN/GLOB SERPL: 0.5 (ref 1.1–2.2)
ALBUMIN/GLOB SERPL: 0.6 (ref 1.1–2.2)
ALP SERPL-CCNC: 84 U/L (ref 45–117)
ALP SERPL-CCNC: 96 U/L (ref 45–117)
ALT SERPL-CCNC: 39 U/L (ref 12–78)
ALT SERPL-CCNC: 42 U/L (ref 12–78)
AMMONIA PLAS-SCNC: 22 UMOL/L
AMPHET UR QL SCN: NEGATIVE
ANION GAP SERPL CALC-SCNC: 12 MMOL/L (ref 5–15)
ANION GAP SERPL CALC-SCNC: 9 MMOL/L (ref 5–15)
APPEARANCE UR: ABNORMAL
ARTERIAL PATENCY WRIST A: YES
AST SERPL W P-5'-P-CCNC: 33 U/L (ref 15–37)
AST SERPL W P-5'-P-CCNC: 37 U/L (ref 15–37)
BACTERIA URNS QL MICRO: NEGATIVE /HPF
BARBITURATES UR QL SCN: NEGATIVE
BASE EXCESS BLDA CALC-SCNC: 7.8 MMOL/L (ref 0–3)
BASOPHILS # BLD: 0 K/UL (ref 0–0.1)
BASOPHILS NFR BLD: 0 % (ref 0–1)
BDY SITE: ABNORMAL
BENZODIAZ UR QL: NEGATIVE
BILIRUB SERPL-MCNC: 0.8 MG/DL (ref 0.2–1)
BILIRUB SERPL-MCNC: 0.9 MG/DL (ref 0.2–1)
BILIRUB UR QL: NEGATIVE
BNP SERPL-MCNC: 616 PG/ML
BUN SERPL-MCNC: 81 MG/DL (ref 6–20)
BUN SERPL-MCNC: 83 MG/DL (ref 6–20)
BUN/CREAT SERPL: 31 (ref 12–20)
BUN/CREAT SERPL: 31 (ref 12–20)
CA-I BLD-MCNC: 1.14 MMOL/L (ref 1.13–1.32)
CA-I BLD-MCNC: 10 MG/DL (ref 8.5–10.1)
CA-I BLD-MCNC: 9.8 MG/DL (ref 8.5–10.1)
CANNABINOIDS UR QL SCN: NEGATIVE
CHLORIDE SERPL-SCNC: 92 MMOL/L (ref 97–108)
CHLORIDE SERPL-SCNC: 93 MMOL/L (ref 97–108)
CO2 SERPL-SCNC: 30 MMOL/L (ref 21–32)
CO2 SERPL-SCNC: 30 MMOL/L (ref 21–32)
COCAINE UR QL SCN: NEGATIVE
COHGB MFR BLD: 0.8 % (ref 1–2)
COLOR UR: ABNORMAL
CREAT SERPL-MCNC: 2.59 MG/DL (ref 0.7–1.3)
CREAT SERPL-MCNC: 2.67 MG/DL (ref 0.7–1.3)
DIFFERENTIAL METHOD BLD: ABNORMAL
EOSINOPHIL # BLD: 0.3 K/UL (ref 0–0.4)
EOSINOPHIL NFR BLD: 5 % (ref 0–7)
EPITH CASTS URNS QL MICRO: NORMAL /LPF
ERYTHROCYTE [DISTWIDTH] IN BLOOD BY AUTOMATED COUNT: 17.8 % (ref 11.5–14.5)
EST. AVERAGE GLUCOSE BLD GHB EST-MCNC: 220 MG/DL
GAS FLOW.O2 O2 DELIVERY SYS: 3 L/MIN
GLOBULIN SER CALC-MCNC: 4.8 G/DL (ref 2–4)
GLOBULIN SER CALC-MCNC: 5 G/DL (ref 2–4)
GLUCOSE BLD STRIP.AUTO-MCNC: 264 MG/DL (ref 65–100)
GLUCOSE BLD STRIP.AUTO-MCNC: 266 MG/DL (ref 65–100)
GLUCOSE BLD STRIP.AUTO-MCNC: 270 MG/DL (ref 65–100)
GLUCOSE BLD STRIP.AUTO-MCNC: 281 MG/DL (ref 65–100)
GLUCOSE BLD STRIP.AUTO-MCNC: 359 MG/DL (ref 65–100)
GLUCOSE BLD STRIP.AUTO-MCNC: 360 MG/DL (ref 65–100)
GLUCOSE SERPL-MCNC: 270 MG/DL (ref 65–100)
GLUCOSE SERPL-MCNC: 275 MG/DL (ref 65–100)
GLUCOSE UR STRIP.AUTO-MCNC: NEGATIVE MG/DL
HBA1C MFR BLD: 9.3 % (ref 4–5.6)
HCO3 BLDA-SCNC: 32 MMOL/L (ref 22–26)
HCT VFR BLD AUTO: 35.4 % (ref 36.6–50.3)
HGB BLD-MCNC: 11.2 G/DL (ref 12.1–17)
HGB UR QL STRIP: NEGATIVE
HYALINE CASTS URNS QL MICRO: NORMAL /LPF (ref 0–5)
IMM GRANULOCYTES # BLD AUTO: 0 K/UL (ref 0–0.04)
IMM GRANULOCYTES NFR BLD AUTO: 1 % (ref 0–0.5)
KETONES UR QL STRIP.AUTO: NEGATIVE MG/DL
LACTATE SERPL-SCNC: 2 MMOL/L (ref 0.4–2)
LEUKOCYTE ESTERASE UR QL STRIP.AUTO: ABNORMAL
LYMPHOCYTES # BLD: 1.2 K/UL (ref 0.8–3.5)
LYMPHOCYTES NFR BLD: 16 % (ref 12–49)
Lab: ABNORMAL
MAGNESIUM SERPL-MCNC: 2.3 MG/DL (ref 1.6–2.4)
MCH RBC QN AUTO: 28.9 PG (ref 26–34)
MCHC RBC AUTO-ENTMCNC: 31.6 G/DL (ref 30–36.5)
MCV RBC AUTO: 91.2 FL (ref 80–99)
METHADONE UR QL: NEGATIVE
METHGB MFR BLD: 0.3 % (ref 0–1.4)
MONOCYTES # BLD: 1 K/UL (ref 0–1)
MONOCYTES NFR BLD: 14 % (ref 5–13)
NEUTS SEG # BLD: 4.8 K/UL (ref 1.8–8)
NEUTS SEG NFR BLD: 64 % (ref 32–75)
NITRITE UR QL STRIP.AUTO: NEGATIVE
NRBC # BLD: 0 K/UL (ref 0–0.01)
NRBC BLD-RTO: 0 PER 100 WBC
OPIATES UR QL: POSITIVE
OXYHGB MFR BLD: 92.4 % (ref 95–99)
PCO2 BLDA: 46 MMHG (ref 35–45)
PCP UR QL: NEGATIVE
PERFORMED BY:: ABNORMAL
PH BLDA: 7.47 (ref 7.35–7.45)
PH UR STRIP: 5 (ref 5–8)
PLATELET # BLD AUTO: 269 K/UL (ref 150–400)
PMV BLD AUTO: 11.1 FL (ref 8.9–12.9)
PO2 BLDA: 67 MMHG (ref 80–100)
POTASSIUM SERPL-SCNC: 3 MMOL/L (ref 3.5–5.1)
POTASSIUM SERPL-SCNC: 3.2 MMOL/L (ref 3.5–5.1)
PROT SERPL-MCNC: 7.4 G/DL (ref 6.4–8.2)
PROT SERPL-MCNC: 7.9 G/DL (ref 6.4–8.2)
PROT UR STRIP-MCNC: NEGATIVE MG/DL
RBC # BLD AUTO: 3.88 M/UL (ref 4.1–5.7)
RBC #/AREA URNS HPF: NORMAL /HPF (ref 0–5)
SAO2 % BLD: 93 % (ref 95–99)
SAO2% DEVICE SAO2% SENSOR NAME: ABNORMAL
SODIUM SERPL-SCNC: 132 MMOL/L (ref 136–145)
SODIUM SERPL-SCNC: 134 MMOL/L (ref 136–145)
SP GR UR REFRACTOMETRY: 1.01 (ref 1–1.03)
SPECIMEN SITE: ABNORMAL
TROPONIN I SERPL HS-MCNC: 32 NG/L (ref 0–76)
UROBILINOGEN UR QL STRIP.AUTO: 0.1 EU/DL (ref 0.1–1)
WBC # BLD AUTO: 7.5 K/UL (ref 4.1–11.1)
WBC URNS QL MICRO: NORMAL /HPF (ref 0–4)

## 2024-07-26 PROCEDURE — 99222 1ST HOSP IP/OBS MODERATE 55: CPT | Performed by: PODIATRIST

## 2024-07-26 PROCEDURE — 87070 CULTURE OTHR SPECIMN AEROBIC: CPT

## 2024-07-26 PROCEDURE — 74176 CT ABD & PELVIS W/O CONTRAST: CPT

## 2024-07-26 PROCEDURE — 6370000000 HC RX 637 (ALT 250 FOR IP): Performed by: FAMILY MEDICINE

## 2024-07-26 PROCEDURE — 83036 HEMOGLOBIN GLYCOSYLATED A1C: CPT

## 2024-07-26 PROCEDURE — 36600 WITHDRAWAL OF ARTERIAL BLOOD: CPT

## 2024-07-26 PROCEDURE — 97530 THERAPEUTIC ACTIVITIES: CPT

## 2024-07-26 PROCEDURE — 87186 SC STD MICRODIL/AGAR DIL: CPT

## 2024-07-26 PROCEDURE — 82140 ASSAY OF AMMONIA: CPT

## 2024-07-26 PROCEDURE — 87040 BLOOD CULTURE FOR BACTERIA: CPT

## 2024-07-26 PROCEDURE — 83605 ASSAY OF LACTIC ACID: CPT

## 2024-07-26 PROCEDURE — 87086 URINE CULTURE/COLONY COUNT: CPT

## 2024-07-26 PROCEDURE — 70450 CT HEAD/BRAIN W/O DYE: CPT

## 2024-07-26 PROCEDURE — 97161 PT EVAL LOW COMPLEX 20 MIN: CPT

## 2024-07-26 PROCEDURE — 82962 GLUCOSE BLOOD TEST: CPT

## 2024-07-26 PROCEDURE — 81001 URINALYSIS AUTO W/SCOPE: CPT

## 2024-07-26 PROCEDURE — 82330 ASSAY OF CALCIUM: CPT

## 2024-07-26 PROCEDURE — 94640 AIRWAY INHALATION TREATMENT: CPT

## 2024-07-26 PROCEDURE — 80053 COMPREHEN METABOLIC PANEL: CPT

## 2024-07-26 PROCEDURE — 83880 ASSAY OF NATRIURETIC PEPTIDE: CPT

## 2024-07-26 PROCEDURE — 6360000002 HC RX W HCPCS: Performed by: FAMILY MEDICINE

## 2024-07-26 PROCEDURE — 80307 DRUG TEST PRSMV CHEM ANLYZR: CPT

## 2024-07-26 PROCEDURE — 87077 CULTURE AEROBIC IDENTIFY: CPT

## 2024-07-26 PROCEDURE — 1100000000 HC RM PRIVATE

## 2024-07-26 PROCEDURE — 83735 ASSAY OF MAGNESIUM: CPT

## 2024-07-26 PROCEDURE — 97165 OT EVAL LOW COMPLEX 30 MIN: CPT

## 2024-07-26 PROCEDURE — 87088 URINE BACTERIA CULTURE: CPT

## 2024-07-26 PROCEDURE — 87205 SMEAR GRAM STAIN: CPT

## 2024-07-26 PROCEDURE — 84484 ASSAY OF TROPONIN QUANT: CPT

## 2024-07-26 PROCEDURE — 2580000003 HC RX 258: Performed by: FAMILY MEDICINE

## 2024-07-26 PROCEDURE — 2700000000 HC OXYGEN THERAPY PER DAY

## 2024-07-26 PROCEDURE — 36410 VNPNXR 3YR/> PHY/QHP DX/THER: CPT

## 2024-07-26 PROCEDURE — 82803 BLOOD GASES ANY COMBINATION: CPT

## 2024-07-26 PROCEDURE — 05HF33Z INSERTION OF INFUSION DEVICE INTO LEFT CEPHALIC VEIN, PERCUTANEOUS APPROACH: ICD-10-PCS | Performed by: FAMILY MEDICINE

## 2024-07-26 PROCEDURE — 85025 COMPLETE CBC W/AUTO DIFF WBC: CPT

## 2024-07-26 RX ORDER — IPRATROPIUM BROMIDE AND ALBUTEROL SULFATE 2.5; .5 MG/3ML; MG/3ML
1 SOLUTION RESPIRATORY (INHALATION) EVERY 4 HOURS PRN
Status: DISCONTINUED | OUTPATIENT
Start: 2024-07-26 | End: 2024-07-30 | Stop reason: HOSPADM

## 2024-07-26 RX ORDER — POTASSIUM CHLORIDE 750 MG/1
40 TABLET, FILM COATED, EXTENDED RELEASE ORAL ONCE
Status: DISCONTINUED | OUTPATIENT
Start: 2024-07-26 | End: 2024-07-30 | Stop reason: HOSPADM

## 2024-07-26 RX ORDER — INSULIN LISPRO 100 [IU]/ML
10 INJECTION, SOLUTION INTRAVENOUS; SUBCUTANEOUS ONCE
Status: COMPLETED | OUTPATIENT
Start: 2024-07-26 | End: 2024-07-26

## 2024-07-26 RX ORDER — OXYBUTYNIN CHLORIDE 5 MG/1
5 TABLET ORAL EVERY EVENING
Status: ON HOLD | COMMUNITY
End: 2024-07-30 | Stop reason: HOSPADM

## 2024-07-26 RX ORDER — METOLAZONE 5 MG/1
5 TABLET ORAL 2 TIMES DAILY
Status: ON HOLD | COMMUNITY
End: 2024-07-30 | Stop reason: HOSPADM

## 2024-07-26 RX ADMIN — INSULIN GLARGINE 40 UNITS: 100 INJECTION, SOLUTION SUBCUTANEOUS at 20:50

## 2024-07-26 RX ADMIN — FLUTICASONE PROPIONATE 2 SPRAY: 50 SPRAY, METERED NASAL at 09:50

## 2024-07-26 RX ADMIN — PIPERACILLIN AND TAZOBACTAM 4500 MG: 4; .5 INJECTION, POWDER, LYOPHILIZED, FOR SOLUTION INTRAVENOUS at 02:24

## 2024-07-26 RX ADMIN — INSULIN LISPRO 4 UNITS: 100 INJECTION, SOLUTION INTRAVENOUS; SUBCUTANEOUS at 20:50

## 2024-07-26 RX ADMIN — OXYCODONE HYDROCHLORIDE AND ACETAMINOPHEN 1000 MG: 500 TABLET ORAL at 09:49

## 2024-07-26 RX ADMIN — OXYCODONE HYDROCHLORIDE 10 MG: 10 TABLET ORAL at 09:49

## 2024-07-26 RX ADMIN — GABAPENTIN 400 MG: 100 CAPSULE ORAL at 09:50

## 2024-07-26 RX ADMIN — SODIUM CHLORIDE, PRESERVATIVE FREE 10 ML: 5 INJECTION INTRAVENOUS at 02:30

## 2024-07-26 RX ADMIN — Medication 1 CAPSULE: at 09:49

## 2024-07-26 RX ADMIN — ASPIRIN 81 MG 81 MG: 81 TABLET ORAL at 09:49

## 2024-07-26 RX ADMIN — PIPERACILLIN AND TAZOBACTAM 4500 MG: 4; .5 INJECTION, POWDER, LYOPHILIZED, FOR SOLUTION INTRAVENOUS at 09:47

## 2024-07-26 RX ADMIN — HEPARIN SODIUM 5000 UNITS: 5000 INJECTION INTRAVENOUS; SUBCUTANEOUS at 14:17

## 2024-07-26 RX ADMIN — OXYCODONE HYDROCHLORIDE 10 MG: 10 TABLET ORAL at 14:18

## 2024-07-26 RX ADMIN — HEPARIN SODIUM 5000 UNITS: 5000 INJECTION INTRAVENOUS; SUBCUTANEOUS at 20:50

## 2024-07-26 RX ADMIN — POTASSIUM CHLORIDE 40 MEQ: 750 TABLET, EXTENDED RELEASE ORAL at 02:25

## 2024-07-26 RX ADMIN — Medication 400 UNITS: at 09:49

## 2024-07-26 RX ADMIN — PANTOPRAZOLE SODIUM 40 MG: 40 TABLET, DELAYED RELEASE ORAL at 06:58

## 2024-07-26 RX ADMIN — PIPERACILLIN AND TAZOBACTAM 4500 MG: 4; .5 INJECTION, POWDER, LYOPHILIZED, FOR SOLUTION INTRAVENOUS at 17:43

## 2024-07-26 RX ADMIN — INSULIN LISPRO 10 UNITS: 100 INJECTION, SOLUTION INTRAVENOUS; SUBCUTANEOUS at 22:43

## 2024-07-26 RX ADMIN — FERROUS SULFATE TAB 325 MG (65 MG ELEMENTAL FE) 325 MG: 325 (65 FE) TAB at 09:50

## 2024-07-26 RX ADMIN — INSULIN GLARGINE 40 UNITS: 100 INJECTION, SOLUTION SUBCUTANEOUS at 09:48

## 2024-07-26 RX ADMIN — POTASSIUM BICARBONATE 40 MEQ: 782 TABLET, EFFERVESCENT ORAL at 02:25

## 2024-07-26 RX ADMIN — SODIUM CHLORIDE, PRESERVATIVE FREE 10 ML: 5 INJECTION INTRAVENOUS at 09:50

## 2024-07-26 RX ADMIN — Medication 2 PUFF: at 07:10

## 2024-07-26 RX ADMIN — SODIUM CHLORIDE, PRESERVATIVE FREE 10 ML: 5 INJECTION INTRAVENOUS at 20:52

## 2024-07-26 RX ADMIN — Medication 2 PUFF: at 21:47

## 2024-07-26 RX ADMIN — OXYCODONE HYDROCHLORIDE 10 MG: 10 TABLET ORAL at 02:55

## 2024-07-26 RX ADMIN — BUMETANIDE 2 MG: 1 TABLET ORAL at 09:50

## 2024-07-26 RX ADMIN — HEPARIN SODIUM 5000 UNITS: 5000 INJECTION INTRAVENOUS; SUBCUTANEOUS at 06:58

## 2024-07-26 RX ADMIN — INSULIN LISPRO 8 UNITS: 100 INJECTION, SOLUTION INTRAVENOUS; SUBCUTANEOUS at 09:49

## 2024-07-26 RX ADMIN — INSULIN LISPRO 8 UNITS: 100 INJECTION, SOLUTION INTRAVENOUS; SUBCUTANEOUS at 12:06

## 2024-07-26 RX ADMIN — POTASSIUM BICARBONATE 40 MEQ: 782 TABLET, EFFERVESCENT ORAL at 12:06

## 2024-07-26 RX ADMIN — GABAPENTIN 400 MG: 100 CAPSULE ORAL at 02:55

## 2024-07-26 RX ADMIN — GABAPENTIN 400 MG: 100 CAPSULE ORAL at 14:18

## 2024-07-26 RX ADMIN — ALLOPURINOL 200 MG: 100 TABLET ORAL at 09:49

## 2024-07-26 ASSESSMENT — PAIN DESCRIPTION - DESCRIPTORS: DESCRIPTORS: ACHING

## 2024-07-26 ASSESSMENT — PAIN DESCRIPTION - ORIENTATION: ORIENTATION: RIGHT

## 2024-07-26 ASSESSMENT — PAIN SCALES - GENERAL
PAINLEVEL_OUTOF10: 6
PAINLEVEL_OUTOF10: 0
PAINLEVEL_OUTOF10: 0

## 2024-07-26 ASSESSMENT — PAIN DESCRIPTION - LOCATION: LOCATION: LEG

## 2024-07-26 ASSESSMENT — PAIN - FUNCTIONAL ASSESSMENT: PAIN_FUNCTIONAL_ASSESSMENT: ACTIVITIES ARE NOT PREVENTED

## 2024-07-26 NOTE — CARE COORDINATION
1130: CM attempted to meet with pt to complete initial CM assessment, but pt requests CM to call his wife as he is tired at time of assessment.     1309: CM attempted to call pt wife to complete initial CM assessment, no answer at this time. CM to complete a medical record assessment.     1329: CM received call from [email or web address], pt is currently on service with them. Referral to be sent, resumption order attached. Pt receives PT/SN.

## 2024-07-26 NOTE — H&P
History and Physical    NAME:   Shantanu Casillas   :  1955   MRN:  [id number]     Date/Time: 2024 8:53 AM    Patient PCP: Ed Pino MD  ______________________________________________________________________       Subjective:     CHIEF COMPLAINT:     Swelling and redness in the legs        HISTORY OF PRESENT ILLNESS:     General Daily Progress Note  Patient is a 68 y.o. year old male morbidly obese history of COPD type 2 diabetes hypertension obstructive sleep apnea CHF ejection fraction 40 to 45% venous stasis ulcer came to emergency room because of generalized weakness had a fall no loss of consciousness or hitting head came to emergency room also complaining of generalized weakness last few days has had some back pain seen by the ER physician and recommended patient to be admitted for possible cellulitis of the both leg    Patient denies any chest pain but has shortness of breath on little exertion patient denies no fever no chills    Past Medical History:   Diagnosis Date    Arthritis     CAD (coronary artery disease)     Chronic obstructive pulmonary disease (HCC)     Diabetes (HCC)     Gout     Hypertension     Ill-defined condition     Sleep apnea     cpap        Past Surgical History:   Procedure Laterality Date    FOOT DEBRIDEMENT Right 12/15/2023    INCISION AND DRAINAGE RIGHT FIRST METATARSAL PHALANGEAL JOINT performed by Polo Oviedo DPM at [facility] MAIN OR    FOOT SURGERY      right heel - foriegn object    HX VEIN ABLATION ADHESIVE      ORTHOPEDIC SURGERY      back surgery    PACEMAKER      aicd -       Social History     Tobacco Use    Smoking status: Never    Smokeless tobacco: Never   Substance Use Topics    Alcohol use: Not Currently        Family History   Problem Relation Age of Onset    Diabetes Mother     Heart Disease Father     Hypertension Father     Diabetes Sister     Diabetes Brother     Hypertension Mother     Heart Disease Mother     Kidney Disease Mother   IV  Type 2 diabetes  Obstructive sleep apnea  Morbidly obese  CHF with ejection fraction 40 to 45%  Hypokalemia  Hyponatremia    Admit to medical surgical telemetry floor  Allopurinol 200 mg daily  Vitamin C 1000 mg twice a day  Aspirin 81 mg daily  Lipitor 40 mg daily  Symbicort 2 puff twice a day  Bumex 2 mg twice a day  Ferrous sulfate 325 mg daily  Flonase nasal spray daily  Neurontin 400 mg 4 times a day  DVT prophylaxis with heparin  Lantus 40 units subcu twice a day  Sliding scale insulin  Oxycodone 10 mg 4 times a day    After blood cultures urine cultures started and wound culture started on IV Zosyn    ID consult podiatry consult and pulmonary consult      ________________________________________________________________________    Signed: Ed Pino MD

## 2024-07-26 NOTE — PROGRESS NOTES
4 Eyes Skin Assessment     NAME:  Shantanu aCsillas  YOB: 1955  MEDICAL RECORD NUMBER:  [id number]    The patient is being assessed for  Admission    I agree that at least one RN has performed a thorough Head to Toe Skin Assessment on the patient. ALL assessment sites listed below have been assessed.      Areas assessed by both nurses:    Head, Face, Ears, Shoulders, Back, Chest, Arms, Elbows, Hands, Sacrum. Buttock, Coccyx, Ischium, Legs. Feet and Heels, and Under Medical Devices         Does the Patient have a Wound? Yes wound(s) were present on assessment. LDA wound assessment was Initiated and completed by RN       Homar Prevention initiated by RN: Yes  Wound Care Orders initiated by RN: Yes    Pressure Injury (Stage 3,4, Unstageable, DTI, NWPT, and Complex wounds) if present, place Wound referral order by RN under : No    New Ostomies, if present place, Ostomy referral order under : No     Nurse 1 eSignature: Electronically signed by Ann Ortega RN on 7/26/24 at 3:33 AM EDT    **SHARE this note so that the co-signing nurse can place an eSignature**    Nurse 2 eSignature: Electronically signed by Alesia Bello RN on 7/26/24 at 4:00 AM EDT

## 2024-07-26 NOTE — CONSULTS
PULMONARY CONSULT  [facility]    Name: Shantanu Casillas MRN: [id number]   : 1955 Hospital: [facility]   Date: 2024  Admission date: 2024 Hospital Day: 2       HPI:     Patient Active Problem List   Diagnosis    Onychomycosis    Chronic ulcer of left leg, with fat layer exposed (HCC)    Idiopathic chronic venous hypertension of left lower extremity with ulcer (HCC)    Ulcerated, foot, left, with fat layer exposed (HCC)    Cellulitis    COVID-19    Venous ulcer (HCC)    Hyperkeratosis    Localized edema    Type 2 diabetes mellitus with diabetic polyneuropathy, with long-term current use of insulin (HCC)    Cellulitis and abscess of right leg    Chronic ulcer of right heel limited to breakdown of skin (HCC)    Ulcer of right leg, with fat layer exposed (HCC)    Ankle ulcer, right, with fat layer exposed (HCC)    Non-pressure chronic ulcer of other part of right lower leg with fat layer exposed (HCC)    Back pain    Acute on chronic systolic CHF (congestive heart failure), NYHA class 2 (HCC)    Shortness of breath    Acute on chronic congestive heart failure (HCC)    Wound infection    Venous stasis ulcer (HCC)    Open wound of right foot    Atherosclerotic heart disease of native coronary artery without angina pectoris    Chronic obstructive pulmonary disease, unspecified (HCC)    Chronic pain disorder    Dependence on supplemental oxygen    Difficulty in walking, not elsewhere classified    Gout, unspecified    Hypertensive heart disease with heart failure (HCC)    Morbid (severe) obesity due to excess calories (HCC)    Muscle weakness (generalized)    Obstructive sleep apnea (adult) (pediatric)    Other lack of coordination    Other symptoms and signs involving the musculoskeletal system    Personal history of COVID-19    Presence of cardiac pacemaker    Type 2 diabetes mellitus with diabetic peripheral angiopathy without gangrene (HCC)    Unspecified cirrhosis of liver (HCC)  Special Requests --        No Special Requests  Left  Hand       Culture No growth after 6 hours       Culture, Wound [[id number]] Collected: 07/26/24 0139    Order Status: Sent Specimen: Leg Updated: 07/26/24 0304    Culture, Wound [[id number]] Collected: 07/26/24 0139    Order Status: Sent Specimen: Leg Updated: 07/26/24 0304             Imaging:  CT CERVICAL SPINE WO CONTRAST    Result Date: 7/25/2024  EXAM:  CT CERVICAL SPINE WITHOUT CONTRAST INDICATION: acute process in spinous processes. Reason for exam:->acute process in spinous processes COMPARISON: None. CONTRAST:  None. TECHNIQUE: Multislice helical CT of the cervical spine was performed without intravenous contrast administration.  Sagittal and coronal reformats were generated.  CT dose reduction was achieved through use of a standardized protocol tailored for this examination and automatic exposure control for dose modulation. FINDINGS: Normal alignment. Vertebral body heights are preserved without evidence of an acute fracture. Mild to moderate degenerative disc disease throughout the cervical spine. No more than mild spinal canal stenosis at any level. Visualized soft tissues of the neck are unremarkable. Visualized lung apices are clear. Partially visualized left chest cardiac device.     1. No evidence of acute fracture. Electronically signed by Juanjose Corral    XR CHEST PORTABLE    Result Date: 7/25/2024  EXAM:  XR CHEST PORTABLE INDICATION:   acute process COMPARISON: Chest radiograph 3/29/2024. FINDINGS: AP radiograph of the chest was obtained. Left chest ICD with leads in the right atrium, right ventricle and coronary sinus. Small left pleural effusion and left basilar atelectasis. No pneumothorax. Heart size is within normal limits. No acute osseous abnormalities.     1. Small left pleural effusion and left basilar atelectasis. Electronically signed by Juanjose Corral       ARTERIAL BLOOD GAS  No results for input(s): \"PHART\", \"[id number]\",

## 2024-07-26 NOTE — PROGRESS NOTES
Patient noted to have moderate twitches of the upper and lower extremities and appears lethargic and weak although arousable. Alert and oriented x4 and follow commands, thought processes intact.  Spoke with patient's wife via telephone who states the twitches started about a week ago and have gotten worse. Wife also states that the patient seems off and his speech is slightly slurred. History of multiple falls with most recent having been x1 week ago.      Patient wife came to bedside and continues to express that patient seems very sleepy and \"not himself\". Spoke with Dr. Pino and informed him of the patient's condition. CT head and abdomen/pelvis, ammonia level, and tele-neuro consult ordered. Also ordered to hold oxycodone and gabapentin given lethargy. Orders carried out.

## 2024-07-26 NOTE — PLAN OF CARE
PHYSICAL THERAPY EVALUATION  Patient: Shantanu Casillas (68 y.o. male)  Date: 7/26/2024  Primary Diagnosis: Cellulitis [L03.90]  Cellulitis of lower extremity, unspecified laterality [L03.119]  Fatigue due to excessive exertion, initial encounter [T73.3XXA]       Precautions: Contact Precautions, Fall Risk                      Recommendations for nursing mobility: Encourage HEP in prep for ADLs/mobility; see handout for details, Frequent repositioning to prevent skin breakdown, Use of bed/chair alarm for safety, and Assist x2    In place during session: Peripheral IV, Nasal Cannula 3L, External Catheter, and EKG/telemetry     ASSESSMENT  Pt is a 68 y.o. male admitted on 7/25/2024 for generalized weakness and GLF at home; pt currently being treated for cellulitis . Pt supine upon PT arrival, agreeable to evaluation. Pt A&O x 2, disoriented to time and situation.  RN at bedside attempting to transfer pt to bariatric bed.     Based on the objective data described below, the patient currently presents with impaired functional mobility, decreased independence in ADLs, impaired ability to perform high-level IADLs, decreased ROM, impaired strength, decreased activity tolerance, poor safety awareness, impaired cognition, decreased command following, poor attention/concentration, decreased coordination, impaired balance, decreased proprioception, impaired vision/visual deficit, decreased fine-motor control, and increased pain levels. (See below for objective details and assist levels).     Overall pt tolerated session poor today with c/o back pain at rest. Pt currently presents with full body spontaneous jerking. Pt's wife on the phone at start of session, she reports jerking onset ~1wk ago and has been progressively worsening. Pt's wife also reports pt has had 3 falls, she states she does not believe that he hit his head. Pt required modAx2 for bed mobility and occasional Gonzalo to maintain seated balance. Pt required maxAx2

## 2024-07-26 NOTE — CARE COORDINATION
07/26/24 1328   Service Assessment   Patient Orientation Alert and Oriented   Cognition Alert  (but drowsy)   Primary Caregiver Self   Accompanied By/Relationship alone   Support Systems Spouse/Significant Other   Patient's Healthcare Decision Maker is: Patient Declined (Legal Next of Kin Remains as Decision Maker)   PCP Verified by CM No   Prior Functional Level Assistance with the following:;Mobility  (uses rollator)   Current Functional Level Assistance with the following:;Mobility  (uses rollator)   Can patient return to prior living arrangement Yes   Ability to make needs known: Fair   Family able to assist with home care needs: Yes   Would you like for me to discuss the discharge plan with any other family members/significant others, and if so, who? Yes  (wife)   Financial Resources Medicare   Community Resources None   CM/SW Referral Other (see comment)  (discharge planning)   Social/Functional History   Lives With Spouse   Type of Home House   Home Equipment Rollator   Discharge Planning   Patient expects to be discharged to: House     CM attempted to complete assessment with pt at bedside, but pt drowsy and requested CM to call wife. CM attempted to call wife, no answer. CM completed assessment via medical record.     Pt does report she is 3L O2 at baseline, and uses a rollator which is seen in pt room.       Advance Care Planning     General Advance Care Planning (ACP) Conversation    Date of Conversation: 7/26/2024  Conducted with: Patient with Decision Making Capacity  Other persons present: None    Healthcare Decision Maker:   Primary Decision Maker: Migdalia Casillas - Spouse - [phone]  Click here to complete Healthcare Decision Makers including selection of the Healthcare Decision Maker Relationship (ie \"Primary\").       Content/Action Overview:  Has NO ACP documents-Information provided    Length of Voluntary ACP Conversation in minutes:  <16 minutes (Non-Billable)    Kenji Wen, RN

## 2024-07-26 NOTE — ED NOTES
RN spoke with MD, fluids ordered for lactic, but MD does not want a repeat lactic on pt.  Pain medications ordered per [facility]

## 2024-07-26 NOTE — CONSULTS
ml/min/1.73m2    Calcium 9.8 8.5 - 10.1 mg/dL    Total Bilirubin 0.9 0.2 - 1.0 mg/dL    AST 37 15 - 37 U/L    ALT 39 12 - 78 U/L    Alk Phosphatase 84 45 - 117 U/L    Total Protein 7.4 6.4 - 8.2 g/dL    Albumin 2.6 (L) 3.5 - 5.0 g/dL    Globulin 4.8 (H) 2.0 - 4.0 g/dL    Albumin/Globulin Ratio 0.5 (L) 1.1 - 2.2     POCT Glucose    Collection Time: 07/26/24 11:44 AM   Result Value Ref Range    POC Glucose 270 (H) 65 - 100 mg/dL    Performed by: Tono Mejia    POCT Glucose    Collection Time: 07/26/24  3:13 PM   Result Value Ref Range    POC Glucose 264 (H) 65 - 100 mg/dL    Performed by: Warren Naranjo        Results:    MRI Results (most recent):  No results found for this or any previous visit from the past 3650 days.       XR Results (most recent):  XR CHEST PORTABLE 07/25/2024    Narrative  EXAM:  XR CHEST PORTABLE    INDICATION:   acute process    COMPARISON: Chest radiograph 3/29/2024.    FINDINGS: AP radiograph of the chest was obtained.    Left chest ICD with leads in the right atrium, right ventricle and coronary  sinus. Small left pleural effusion and left basilar atelectasis. No  pneumothorax. Heart size is within normal limits. No acute osseous  abnormalities.    Impression  1. Small left pleural effusion and left basilar atelectasis.      Electronically signed by Juanjose Corral       Assessment:     Bilateral lower extremity cellulitis.  Bilateral lower extremity ulcers  Diabetes mellitus with neuropathy.    Plan:     Patient seen and evaluated at bedside  - Current labs personally reviewed and findings dicussed with patient  -Recommend continue empirical IV antibiotics.  Wound cultures pending  -Wound care:irrigate with saline, remove slough using saline moistened gauze, apply calcium alginate Ag, cover with ABD pads, wrap with kerlix, and apply a tubi-sock every MWF and prn for soiling.     Polo Oviedo DPM, CWSP, AACFAS

## 2024-07-26 NOTE — ED NOTES
ED TO INPATIENT SBAR HANDOFF    Patient Name: Shantanu Casillas   Preferred Name: Shantanu  : 1955  68 y.o.   Family/Caregiver Present: no   Code Status Order: Full Code  PO Status: NPO:No  Telemetry Order:   C-SSRS: Risk of Suicide: No Risk  Sitter no     Restraints:     Sepsis Risk Score      Situation  Chief Complaint   Patient presents with    Fatigue    Fall     Brief Description of Patient's Condition: Multiple complaints, neck pain,  bilateral leg wounds   Mental Status: oriented  Arrived from:Home  Imaging:   CT CERVICAL SPINE WO CONTRAST   Final Result      1. No evidence of acute fracture.         Electronically signed by Juanjose Corral      XR CHEST PORTABLE   Final Result   1. Small left pleural effusion and left basilar atelectasis.         Electronically signed by Juanjose Corral        Abnormal labs:   Abnormal Labs Reviewed   BASIC METABOLIC PANEL - Abnormal; Notable for the following components:       Result Value    Sodium 133 (*)     Potassium 3.3 (*)     Chloride 91 (*)     Glucose 270 (*)     BUN 86 (*)     Creatinine 2.73 (*)     BUN/Creatinine Ratio 32 (*)     Est, Glom Filt Rate 24 (*)     All other components within normal limits   CBC WITH AUTO DIFFERENTIAL - Abnormal; Notable for the following components:    RBC 4.01 (*)     Hemoglobin 11.6 (*)     Hematocrit 36.2 (*)     RDW 17.8 (*)     Immature Granulocytes % 1 (*)     Monocytes Absolute 1.2 (*)     Immature Granulocytes Absolute 0.1 (*)     All other components within normal limits   LACTIC ACID - Abnormal; Notable for the following components:    Lactic Acid, Plasma 5.2 (*)     All other components within normal limits       Background  Allergies:   Allergies   Allergen Reactions    Amitriptyline Angioedema    Naproxen      Other reaction(s): Unknown (comments)  Pt not sure of reaction    Sulfa Antibiotics Nausea And Vomiting     History:   Past Medical History:   Diagnosis Date    Arthritis     CAD (coronary artery disease)      MCG/ACT inhaler 2 puff (has no administration in time range)     And   tiotropium (SPIRIVA RESPIMAT) 2.5 MCG/ACT inhaler 2 puff (has no administration in time range)   gabapentin (NEURONTIN) capsule 400 mg (has no administration in time range)   Insulin Glargine (2 Unit Dial) SOPN 40 Units (has no administration in time range)   ipratropium (ATROVENT) 0.03 % nasal spray 2 spray (has no administration in time range)   oxyCODONE HCl (OXY-IR) immediate release tablet 10 mg (has no administration in time range)   pantoprazole (PROTONIX) tablet 40 mg (has no administration in time range)   potassium chloride (KLOR-CON) 20 MEQ packet 40 mEq (has no administration in time range)   vitamin E capsule 400 Units (has no administration in time range)   insulin lispro (HUMALOG,ADMELOG) injection vial 0-16 Units (has no administration in time range)   insulin lispro (HUMALOG,ADMELOG) injection vial 0-4 Units (has no administration in time range)   glucose chewable tablet 16 g (has no administration in time range)   dextrose bolus 10% 125 mL (has no administration in time range)     Or   dextrose bolus 10% 250 mL (has no administration in time range)   glucagon injection 1 mg (has no administration in time range)   dextrose 10 % infusion (has no administration in time range)   sodium chloride flush 0.9 % injection 5-40 mL (has no administration in time range)   sodium chloride flush 0.9 % injection 5-40 mL (has no administration in time range)   0.9 % sodium chloride infusion (has no administration in time range)   potassium chloride (KLOR-CON M) extended release tablet 40 mEq (has no administration in time range)     Or   potassium bicarb-citric acid (EFFER-K) effervescent tablet 40 mEq (has no administration in time range)     Or   potassium chloride 10 mEq/100 mL IVPB (Peripheral Line) (has no administration in time range)   magnesium sulfate 2000 mg in 50 mL IVPB premix (has no administration in time range)   ondansetron

## 2024-07-26 NOTE — WOUND CARE
07/26/24 0230   Wound Cleansed Cleansed with saline 07/26/24 0230   Dressing/Treatment Open to air 07/26/24 1308   Wound Length (cm) 0.5 cm 07/26/24 1308   Wound Width (cm) 0.6 cm 07/26/24 1308   Wound Depth (cm) 0 cm 07/26/24 1308   Wound Surface Area (cm^2) 0.3 cm^2 07/26/24 1308   Wound Volume (cm^3) 0 cm^3 07/26/24 1308   Wound Assessment Dry;Blood filled blister 07/26/24 1308   Drainage Amount None (dry) 07/26/24 1308   Odor None 07/26/24 1308   Emy-wound Assessment Dry/flaky 07/26/24 1308   Margins Attached edges 07/26/24 1308   Number of days: 0       [REMOVED] Wound 06/20/24 Toe (Comment  which one) Right #3 2nd (Removed)   Wound Image   06/27/24 1443   Wound Etiology Venous 06/27/24 1443   Dressing Status Other (Comment) 06/27/24 1443   Wound Cleansed Soap and water 06/27/24 1443   Wound Length (cm) 0 cm 06/27/24 1443   Wound Width (cm) 0 cm 06/27/24 1443   Wound Depth (cm) 0 cm 06/27/24 1443   Wound Surface Area (cm^2) 0 cm^2 06/27/24 1443   Change in Wound Size % (l*w) 100 06/27/24 1443   Wound Volume (cm^3) 0 cm^3 06/27/24 1443   Wound Healing % 100 06/27/24 1443   Wound Assessment Epithelialization 06/27/24 1443   Drainage Amount None (dry) 06/27/24 1443   Number of days: 7       [REMOVED] Wound 06/20/24 Toe (Comment  which one) Left 2nd toe #6 (Removed)   Wound Image   06/27/24 1443   Wound Etiology Venous 06/27/24 1443   Dressing Status Other (Comment) 06/27/24 1443   Wound Cleansed Soap and water 06/27/24 1443   Wound Length (cm) 0 cm 06/27/24 1443   Wound Width (cm) 0 cm 06/27/24 1443   Wound Depth (cm) 0 cm 06/27/24 1443   Wound Surface Area (cm^2) 0 cm^2 06/27/24 1443   Change in Wound Size % (l*w) 100 06/27/24 1443   Wound Volume (cm^3) 0 cm^3 06/27/24 1443   Wound Healing % 100 06/27/24 1443   Wound Assessment Epithelialization 06/27/24 1443   Drainage Amount None (dry) 06/27/24 1443   Number of days: 7        Assessment:   -A&O x: 4  -Verbally communicative (Y/N): Y  -Mobile (Y/N): N     reinforcement  [] Unsuccessful      [] Verbal Understanding  [] Demonstrated understanding       [] No evidence of learning  [] Refused teaching         [] N/A       Electronically signed by Viki Lara RN on 7/26/2024 at 1:17 PM

## 2024-07-26 NOTE — CONSULTS
Nephrology Consultation          Patient: Shantanu Casillas MRN: [id number]  SSN: [id number]    YOB: 1955  Age: 68 y.o.  Sex: male      Subjective:   Reason for the consultation.  Acute kidney injury on chronic kidney disease stage IV  History of present illness.  The patient is 68-year-old man with underlying history of COPD, diabetes mellitus type 2, congestive heart failure with LVEF 40 to 45%, bilateral lower extremity venous stasis ulcers, morbid obesity, obstructive sleep apnea, chronic kidney disease stage IV, gouty arthritis presented to the ER after a fall.  On arrival blood pressure was normal tachycardic and afebrile.  On nasal cannula 3 L which is his baseline.  CBC showed no leukocytosis.  Diagnosed with bilateral lower extremity cellulitis and was put on IV Zosyn.  Initial chemistry showed elevated BUN/creatinine 86/2.73 which prompted nephrology consultation.  He was on Bumex 2 mg twice a day at home.  Chest x-ray small pleural effusion but no pulmonary edema.  No worsening bilateral lower extremity swelling.  No voiding issues so far.    Past Medical History:   Diagnosis Date    Arthritis     CAD (coronary artery disease)     Chronic obstructive pulmonary disease (HCC)     Diabetes (HCC)     Gout     Hypertension     Ill-defined condition     Sleep apnea     cpap     Past Surgical History:   Procedure Laterality Date    FOOT DEBRIDEMENT Right 12/15/2023    INCISION AND DRAINAGE RIGHT FIRST METATARSAL PHALANGEAL JOINT performed by Polo Oviedo DPM at [facility] MAIN OR    FOOT SURGERY      right heel - foriegn object    HX VEIN ABLATION ADHESIVE      ORTHOPEDIC SURGERY      back surgery    PACEMAKER      aicd -      Family History   Problem Relation Age of Onset    Diabetes Mother     Heart Disease Father     Hypertension Father     Diabetes Sister     Diabetes Brother     Hypertension Mother     Heart Disease Mother     Kidney Disease Mother      Social History     Tobacco Use      10 mEq IntraVENous PRN Ed Pino MD        magnesium sulfate 2000 mg in 50 mL IVPB premix  2,000 mg IntraVENous PRN Ed Pino MD        ondansetron (ZOFRAN-ODT) disintegrating tablet 4 mg  4 mg Oral Q8H PRN Ed Pino MD        Or    ondansetron (ZOFRAN) injection 4 mg  4 mg IntraVENous Q6H PRN Ed Pino MD        polyethylene glycol (GLYCOLAX) packet 17 g  17 g Oral Daily PRN Ed Pino MD        acetaminophen (TYLENOL) tablet 650 mg  650 mg Oral Q6H PRN Ed Pino MD        Or    acetaminophen (TYLENOL) suppository 650 mg  650 mg Rectal Q6H PRN Ed Pino MD        heparin (porcine) injection 5,000 Units  5,000 Units SubCUTAneous 3 times per day Ed Pino MD   5,000 Units at 07/26/24 1417    piperacillin-tazobactam (ZOSYN) 4,500 mg in sodium chloride 0.9 % 100 mL IVPB (mini-bag)  4,500 mg IntraVENous q8h Ed Pino MD   Stopped at 07/26/24 1412        Allergies   Allergen Reactions    Amitriptyline Angioedema    Naproxen      Other reaction(s): Unknown (comments)  Pt not sure of reaction    Sulfa Antibiotics Nausea And Vomiting       Review of Systems:  A comprehensive review of systems was negative except for that written in the History of Present Illness.    Objective:     Vitals:    07/26/24 1019 07/26/24 1427 07/26/24 1429 07/26/24 1448   BP:  (!) 137/118 114/63    Pulse:  (!) 113 (!) 111    Resp: 17 16  17   Temp:  98.6 °F (37 °C)     TempSrc:  Oral     SpO2:  94%     Weight:       Height:            Physical Exam:  General: NAD  Eyes: sclera anicteric  Oral Cavity: No thrush or ulcers  Neck: no JVD  Chest: Fair bilateral air entry  Heart: normal sounds  Abdomen: soft and non tender   : no juares  Lower Extremities: Trace edema in bilateral venous stasis ulcers  Skin: no rash  Neuro: intact  Psychiatric: non-depressed          Assessment/Plan:     DEMARCO on chronic kidney disease stage IIIB/IV  2/2 prerenal azotemia from aggressive

## 2024-07-26 NOTE — PROGRESS NOTES
Spiritual Health Assessment/Progress Note  [facility]    Initial Encounter,  ,  ,      Name: Shantanu Casillas MRN: [id number]    Age: 68 y.o.     Sex: male   Language: English   Faith: Rastafarian   Cellulitis     Date: 7/26/2024            Total Time Calculated: 18 min              Spiritual Assessment began in [facility] [place]        Referral/Consult From: Rounding   Encounter Overview/Reason: Initial Encounter  Service Provided For: Patient    Lakesha, Belief, Meaning:   Patient Other: unknown  Family/Friends No family/friends present      Importance and Influence:  Patient Other: unknown  Family/Friends no family/friends present    Community:  Patient feels well-supported. Support system includes: Extended family  Family/Friends Other: none    Assessment and Plan of Care:     Patient Interventions include: Facilitated expression of thoughts and feelings and Affirmed coping skills/support systems  Family/Friends Interventions include: Other: none    Patient Plan of Care: Spiritual Care available upon further referral  Family/Friends Plan of Care: Other: none    Electronically signed by Chaplain Malou Intern on 7/26/2024 at 4:40 PM

## 2024-07-26 NOTE — PLAN OF CARE
OCCUPATIONAL THERAPY EVALUATION  Patient: Shantanu Casillas (68 y.o. male)  Date: 7/26/2024  Primary Diagnosis: Cellulitis [L03.90]  Cellulitis of lower extremity, unspecified laterality [L03.119]  Fatigue due to excessive exertion, initial encounter [T73.3XXA]       Precautions: Ax2, Bed Alarm, Fall Risk                Recommendations for nursing mobility: Out of bed to chair for meals, Encourage HEP in prep for ADLs/mobility; see handout for details, Frequent repositioning to prevent skin breakdown, Use of bed/chair alarm for safety, LE elevation for management of edema, and Assist x2    In place during session:Peripheral IV, Nasal Cannula 3L, External Catheter, and EKG/telemetry   ASSESSMENT  Pt is a 68 y.o. male presenting to [facility] with c/o swelling and redness in legs, admitted 7/25/24 and currently being treated for acute cellulitis BLE, CKD stage IV, DM II, JAYESH, morbidly obese, CHF, hypokalemia, hyponatremia. Pt received semi-supine in bed upon arrival, AXO x 2, and agreeable to OT evaluation.     Based on current observations, pt presents with decreased  functional mobility, independence in ADLs, high-level IADLs, ROM, strength, body mechanics, activity tolerance, endurance, safety awareness, cognition, command following, attention/concentration, coordination, balance, proprioception, vision/visual deficit, posture, increased pain levels (see below for objective details and assist levels).     Overall, pt tolerates session fair with c/o back pain. Bed mobility and OOB functional mobility/transfers completed with mod-max Ax2 and additional time. Pt completed 2 sit<>Stand transfers prior to SPT s/t pt's knees buckling during first sit>stand req'ing him to return seated EOB. Ax2 provided for trunk support, BLE blocking, and guiding hips towards bariatric bed during SPT. Pt demo'ing jerkiness BUE impacting pt's coordination and overall control of UB. Pt also demo'ing decreased RUE/RLE strength compared to L. Pt  eval      Based on the above components, the patient evaluation is determined to be of the following complexity level: Low    Pain Rating:  Back and B eye pain   Pain Intervention(s):       Activity Tolerance:   Fair  and requires rest breaks    After treatment patient left in no apparent distress:    Patient left in no apparent distress in bed, Call bell within reach, Bed/ chair alarm activated, Side rails x3, and RN at bedside , bed locked and in lowest position    COMMUNICATION/EDUCATION:   The patient’s plan of care was discussed with: Physical therapist and Registered nurse    Patient Education  Education Given To: Patient  Education Provided: Role of Therapy;Fall Prevention Strategies;Plan of Care;Transfer Training;Mobility Training;ADL Adaptive Strategies  Education Method: Verbal  Barriers to Learning: Vision  Education Outcome: Verbalized understanding;Demonstrated understanding;Continued education needed    The supervising occupational therapist and treating occupational therapist assistant have met to review this patient’s progress and plan of care.    This patient’s plan of care is appropriate for delegation to ODALIS.     PT/OT sessions occurred together for increased safety of pt and clinician.     Thank you for this referral,  Caro Yo OT  Minutes: 45

## 2024-07-27 LAB
ALBUMIN SERPL-MCNC: 2.5 G/DL (ref 3.5–5)
ALBUMIN/GLOB SERPL: 0.5 (ref 1.1–2.2)
ALP SERPL-CCNC: 83 U/L (ref 45–117)
ALT SERPL-CCNC: 40 U/L (ref 12–78)
ANION GAP SERPL CALC-SCNC: 8 MMOL/L (ref 5–15)
AST SERPL W P-5'-P-CCNC: 39 U/L (ref 15–37)
BILIRUB SERPL-MCNC: 1.1 MG/DL (ref 0.2–1)
BUN SERPL-MCNC: 83 MG/DL (ref 6–20)
BUN/CREAT SERPL: 30 (ref 12–20)
CA-I BLD-MCNC: 9.5 MG/DL (ref 8.5–10.1)
CHLORIDE SERPL-SCNC: 94 MMOL/L (ref 97–108)
CO2 SERPL-SCNC: 33 MMOL/L (ref 21–32)
CREAT SERPL-MCNC: 2.79 MG/DL (ref 0.7–1.3)
ERYTHROCYTE [DISTWIDTH] IN BLOOD BY AUTOMATED COUNT: 18.4 % (ref 11.5–14.5)
GLOBULIN SER CALC-MCNC: 4.8 G/DL (ref 2–4)
GLUCOSE BLD STRIP.AUTO-MCNC: 263 MG/DL (ref 65–100)
GLUCOSE BLD STRIP.AUTO-MCNC: 308 MG/DL (ref 65–100)
GLUCOSE BLD STRIP.AUTO-MCNC: 311 MG/DL (ref 65–100)
GLUCOSE BLD STRIP.AUTO-MCNC: 321 MG/DL (ref 65–100)
GLUCOSE BLD STRIP.AUTO-MCNC: 332 MG/DL (ref 65–100)
GLUCOSE SERPL-MCNC: 270 MG/DL (ref 65–100)
HCT VFR BLD AUTO: 33.3 % (ref 36.6–50.3)
HGB BLD-MCNC: 10.3 G/DL (ref 12.1–17)
MCH RBC QN AUTO: 28.7 PG (ref 26–34)
MCHC RBC AUTO-ENTMCNC: 30.9 G/DL (ref 30–36.5)
MCV RBC AUTO: 92.8 FL (ref 80–99)
NRBC # BLD: 0 K/UL (ref 0–0.01)
NRBC BLD-RTO: 0 PER 100 WBC
PERFORMED BY:: ABNORMAL
PLATELET # BLD AUTO: 252 K/UL (ref 150–400)
PMV BLD AUTO: 11 FL (ref 8.9–12.9)
POTASSIUM SERPL-SCNC: 3.3 MMOL/L (ref 3.5–5.1)
PROT SERPL-MCNC: 7.3 G/DL (ref 6.4–8.2)
RBC # BLD AUTO: 3.59 M/UL (ref 4.1–5.7)
SODIUM SERPL-SCNC: 135 MMOL/L (ref 136–145)
WBC # BLD AUTO: 9.4 K/UL (ref 4.1–11.1)

## 2024-07-27 PROCEDURE — 82962 GLUCOSE BLOOD TEST: CPT

## 2024-07-27 PROCEDURE — 80053 COMPREHEN METABOLIC PANEL: CPT

## 2024-07-27 PROCEDURE — 85027 COMPLETE CBC AUTOMATED: CPT

## 2024-07-27 PROCEDURE — 1100000000 HC RM PRIVATE

## 2024-07-27 PROCEDURE — 6360000002 HC RX W HCPCS: Performed by: FAMILY MEDICINE

## 2024-07-27 PROCEDURE — 94640 AIRWAY INHALATION TREATMENT: CPT

## 2024-07-27 PROCEDURE — 99222 1ST HOSP IP/OBS MODERATE 55: CPT | Performed by: STUDENT IN AN ORGANIZED HEALTH CARE EDUCATION/TRAINING PROGRAM

## 2024-07-27 PROCEDURE — 36415 COLL VENOUS BLD VENIPUNCTURE: CPT

## 2024-07-27 PROCEDURE — 2580000003 HC RX 258: Performed by: FAMILY MEDICINE

## 2024-07-27 PROCEDURE — 97530 THERAPEUTIC ACTIVITIES: CPT

## 2024-07-27 PROCEDURE — 6370000000 HC RX 637 (ALT 250 FOR IP): Performed by: FAMILY MEDICINE

## 2024-07-27 PROCEDURE — 2580000003 HC RX 258: Performed by: INTERNAL MEDICINE

## 2024-07-27 PROCEDURE — 94760 N-INVAS EAR/PLS OXIMETRY 1: CPT

## 2024-07-27 PROCEDURE — 2700000000 HC OXYGEN THERAPY PER DAY

## 2024-07-27 PROCEDURE — 6370000000 HC RX 637 (ALT 250 FOR IP): Performed by: INTERNAL MEDICINE

## 2024-07-27 RX ORDER — INSULIN GLARGINE 100 [IU]/ML
40 INJECTION, SOLUTION SUBCUTANEOUS NIGHTLY
Status: DISCONTINUED | OUTPATIENT
Start: 2024-07-27 | End: 2024-07-30 | Stop reason: HOSPADM

## 2024-07-27 RX ORDER — INSULIN GLARGINE 100 [IU]/ML
50 INJECTION, SOLUTION SUBCUTANEOUS ONCE
Status: COMPLETED | OUTPATIENT
Start: 2024-07-27 | End: 2024-07-27

## 2024-07-27 RX ORDER — MAGNESIUM HYDROXIDE/ALUMINUM HYDROXICE/SIMETHICONE 120; 1200; 1200 MG/30ML; MG/30ML; MG/30ML
30 SUSPENSION ORAL EVERY 6 HOURS PRN
Status: DISCONTINUED | OUTPATIENT
Start: 2024-07-27 | End: 2024-07-30 | Stop reason: HOSPADM

## 2024-07-27 RX ORDER — INSULIN GLARGINE 100 [IU]/ML
90 INJECTION, SOLUTION SUBCUTANEOUS EVERY MORNING
Status: DISCONTINUED | OUTPATIENT
Start: 2024-07-28 | End: 2024-07-30 | Stop reason: HOSPADM

## 2024-07-27 RX ORDER — SODIUM CHLORIDE 9 MG/ML
INJECTION, SOLUTION INTRAVENOUS CONTINUOUS
Status: DISCONTINUED | OUTPATIENT
Start: 2024-07-27 | End: 2024-07-30

## 2024-07-27 RX ORDER — POTASSIUM CHLORIDE 750 MG/1
40 TABLET, FILM COATED, EXTENDED RELEASE ORAL ONCE
Status: COMPLETED | OUTPATIENT
Start: 2024-07-27 | End: 2024-07-27

## 2024-07-27 RX ADMIN — INSULIN LISPRO 12 UNITS: 100 INJECTION, SOLUTION INTRAVENOUS; SUBCUTANEOUS at 11:48

## 2024-07-27 RX ADMIN — ATORVASTATIN CALCIUM 40 MG: 40 TABLET, FILM COATED ORAL at 20:35

## 2024-07-27 RX ADMIN — Medication 1 CAPSULE: at 09:05

## 2024-07-27 RX ADMIN — FERROUS SULFATE TAB 325 MG (65 MG ELEMENTAL FE) 325 MG: 325 (65 FE) TAB at 09:06

## 2024-07-27 RX ADMIN — ACETAMINOPHEN 650 MG: 325 TABLET ORAL at 09:19

## 2024-07-27 RX ADMIN — FLUTICASONE PROPIONATE 2 SPRAY: 50 SPRAY, METERED NASAL at 20:47

## 2024-07-27 RX ADMIN — POTASSIUM CHLORIDE 40 MEQ: 750 TABLET, EXTENDED RELEASE ORAL at 10:33

## 2024-07-27 RX ADMIN — SODIUM CHLORIDE, PRESERVATIVE FREE 10 ML: 5 INJECTION INTRAVENOUS at 09:06

## 2024-07-27 RX ADMIN — HEPARIN SODIUM 5000 UNITS: 5000 INJECTION INTRAVENOUS; SUBCUTANEOUS at 06:42

## 2024-07-27 RX ADMIN — INSULIN LISPRO 8 UNITS: 100 INJECTION, SOLUTION INTRAVENOUS; SUBCUTANEOUS at 09:04

## 2024-07-27 RX ADMIN — ALLOPURINOL 200 MG: 100 TABLET ORAL at 09:05

## 2024-07-27 RX ADMIN — POLYETHYLENE GLYCOL 3350 17 G: 17 POWDER, FOR SOLUTION ORAL at 20:58

## 2024-07-27 RX ADMIN — ACETAMINOPHEN 650 MG: 325 TABLET ORAL at 14:45

## 2024-07-27 RX ADMIN — Medication 2 PUFF: at 21:30

## 2024-07-27 RX ADMIN — Medication 400 UNITS: at 20:35

## 2024-07-27 RX ADMIN — Medication 2 PUFF: at 07:32

## 2024-07-27 RX ADMIN — INSULIN LISPRO 4 UNITS: 100 INJECTION, SOLUTION INTRAVENOUS; SUBCUTANEOUS at 20:46

## 2024-07-27 RX ADMIN — POTASSIUM BICARBONATE 40 MEQ: 782 TABLET, EFFERVESCENT ORAL at 20:35

## 2024-07-27 RX ADMIN — OXYCODONE HYDROCHLORIDE AND ACETAMINOPHEN 1000 MG: 500 TABLET ORAL at 20:35

## 2024-07-27 RX ADMIN — INSULIN GLARGINE 50 UNITS: 100 INJECTION, SOLUTION SUBCUTANEOUS at 10:34

## 2024-07-27 RX ADMIN — PIPERACILLIN AND TAZOBACTAM 4500 MG: 4; .5 INJECTION, POWDER, LYOPHILIZED, FOR SOLUTION INTRAVENOUS at 09:03

## 2024-07-27 RX ADMIN — OXYCODONE HYDROCHLORIDE AND ACETAMINOPHEN 1000 MG: 500 TABLET ORAL at 09:05

## 2024-07-27 RX ADMIN — INSULIN GLARGINE 40 UNITS: 100 INJECTION, SOLUTION SUBCUTANEOUS at 09:04

## 2024-07-27 RX ADMIN — PANTOPRAZOLE SODIUM 40 MG: 40 TABLET, DELAYED RELEASE ORAL at 06:41

## 2024-07-27 RX ADMIN — HEPARIN SODIUM 5000 UNITS: 5000 INJECTION INTRAVENOUS; SUBCUTANEOUS at 20:35

## 2024-07-27 RX ADMIN — POTASSIUM BICARBONATE 40 MEQ: 782 TABLET, EFFERVESCENT ORAL at 09:05

## 2024-07-27 RX ADMIN — INSULIN GLARGINE 40 UNITS: 100 INJECTION, SOLUTION SUBCUTANEOUS at 20:47

## 2024-07-27 RX ADMIN — PIPERACILLIN AND TAZOBACTAM 4500 MG: 4; .5 INJECTION, POWDER, LYOPHILIZED, FOR SOLUTION INTRAVENOUS at 01:02

## 2024-07-27 RX ADMIN — INSULIN LISPRO 12 UNITS: 100 INJECTION, SOLUTION INTRAVENOUS; SUBCUTANEOUS at 16:45

## 2024-07-27 RX ADMIN — ASPIRIN 81 MG 81 MG: 81 TABLET ORAL at 09:05

## 2024-07-27 RX ADMIN — HEPARIN SODIUM 5000 UNITS: 5000 INJECTION INTRAVENOUS; SUBCUTANEOUS at 14:30

## 2024-07-27 RX ADMIN — SODIUM CHLORIDE: 9 INJECTION, SOLUTION INTRAVENOUS at 13:08

## 2024-07-27 RX ADMIN — FLUTICASONE PROPIONATE 2 SPRAY: 50 SPRAY, METERED NASAL at 10:35

## 2024-07-27 RX ADMIN — MAGNESIUM HYDROXIDE 30 ML: 400 SUSPENSION ORAL at 11:48

## 2024-07-27 RX ADMIN — Medication 400 UNITS: at 09:05

## 2024-07-27 RX ADMIN — Medication 2 PUFF: at 07:31

## 2024-07-27 RX ADMIN — PIPERACILLIN AND TAZOBACTAM 4500 MG: 4; .5 INJECTION, POWDER, LYOPHILIZED, FOR SOLUTION INTRAVENOUS at 16:46

## 2024-07-27 ASSESSMENT — PAIN SCALES - GENERAL
PAINLEVEL_OUTOF10: 0
PAINLEVEL_OUTOF10: 0
PAINLEVEL_OUTOF10: 9
PAINLEVEL_OUTOF10: 0
PAINLEVEL_OUTOF10: 6
PAINLEVEL_OUTOF10: 0
PAINLEVEL_OUTOF10: 0

## 2024-07-27 ASSESSMENT — PAIN DESCRIPTION - LOCATION
LOCATION: BACK
LOCATION: BACK;NECK

## 2024-07-27 ASSESSMENT — PAIN DESCRIPTION - ORIENTATION: ORIENTATION: POSTERIOR

## 2024-07-27 ASSESSMENT — PAIN DESCRIPTION - DESCRIPTORS
DESCRIPTORS: ACHING;SORE
DESCRIPTORS: ACHING;SORE

## 2024-07-27 NOTE — PLAN OF CARE
Problem: Discharge Planning  Goal: Discharge to home or other facility with appropriate resources  7/26/2024 2302 by Ary Garrido RN  Outcome: Progressing  7/26/2024 1544 by Elsie Khan RN  Outcome: Progressing  Flowsheets (Taken 7/26/2024 0950)  Discharge to home or other facility with appropriate resources: Identify barriers to discharge with patient and caregiver     Problem: Pain  Goal: Verbalizes/displays adequate comfort level or baseline comfort level  7/26/2024 2302 by Ary Garrido RN  Outcome: Progressing  7/26/2024 1544 by Elsie Khan RN  Outcome: Progressing     Problem: Skin/Tissue Integrity  Goal: Absence of new skin breakdown  Description: 1.  Monitor for areas of redness and/or skin breakdown  2.  Assess vascular access sites hourly  3.  Every 4-6 hours minimum:  Change oxygen saturation probe site  4.  Every 4-6 hours:  If on nasal continuous positive airway pressure, respiratory therapy assess nares and determine need for appliance change or resting period.  7/26/2024 2302 by Ary Garrido RN  Outcome: Progressing  7/26/2024 1544 by Elsie Khan RN  Outcome: Progressing     Problem: Safety - Adult  Goal: Free from fall injury  7/26/2024 2302 by Ary Garrido RN  Outcome: Progressing  7/26/2024 1544 by Elsie Khan RN  Outcome: Progressing     Problem: Chronic Conditions and Co-morbidities  Goal: Patient's chronic conditions and co-morbidity symptoms are monitored and maintained or improved  7/26/2024 2302 by Ary Garrido RN  Outcome: Progressing  7/26/2024 1544 by Elsie Khan RN  Outcome: Progressing     Problem: Physical Therapy - Adult  Goal: By Discharge: Performs mobility at highest level of function for planned discharge setting.  See evaluation for individualized goals.  Description: FUNCTIONAL STATUS PRIOR TO ADMISSION: Patient was modified independent using a rollator for functional mobility.    HOME SUPPORT PRIOR TO

## 2024-07-27 NOTE — PROCEDURES
Midline Insertion Procedure Note    Procedure: Insertion of #4 FR/18G Midline    Indications:  Poor Access    Procedure Details   Bedside timeout performed with Iris QUIJANO, order on chart, Dr. Rodriguez with nephrology has okayed midline insertion.     #4 FR/18G Midline inserted to the L compressible Cephalic vein using ultrasound and MST per hospital protocol.   Blood return:  yes    Findings:  Catheter inserted to 15 cm, with 0 cm. Exposed. Mid upper arm circumference is 47 cm.  There were no changes to vital signs. Catheter was flushed with 20 cc NS. Patient did tolerate procedure well.    Recommendations:  PICC Brochure given to patient with teaching instruction.PROCEDURE NOTE

## 2024-07-27 NOTE — PLAN OF CARE
Problem: Physical Therapy - Adult  Goal: By Discharge: Performs mobility at highest level of function for planned discharge setting.  See evaluation for individualized goals.  Description: FUNCTIONAL STATUS PRIOR TO ADMISSION: Patient was modified independent using a rollator for functional mobility.    HOME SUPPORT PRIOR TO ADMISSION: The patient lived with spouse and required minimal assistance for ADLs.    Physical Therapy Goals  Initiated 7/26/2024  Pt stated goal: to go home  Pt will be I with LE HEP in 7 days.  Pt will perform bed mobility with Modified [place] in 7 days.  Pt will perform transfers with Contact Guard Assist in 7 days.   Pt will amb 10-25 feet with LRAD safely with Minimal Assist in 7 days.  Pt will demonstrate improvement in standing balance from poor to fair in 7 days.     Outcome: Progressing            PHYSICAL THERAPY TREATMENT     Patient: Shantanu Casillas (68 y.o. male)  Date: 7/27/2024  Diagnosis: Cellulitis [L03.90]  Cellulitis of lower extremity, unspecified laterality [L03.119]  Fatigue due to excessive exertion, initial encounter [T73.3XXA] Cellulitis      Precautions: Bed Alarm, Fall Risk                      Recommendations for nursing mobility: Encourage HEP in prep for ADLs/mobility; see handout for details, Frequent repositioning to prevent skin breakdown, LE elevation for management of edema, Use of BSC for toileting , AD and gt belt for bed to chair , and Assist x2    In place during session: Peripheral IV, Nasal Cannula 2-3?L, External Catheter, and EKG/telemetry   Chart, physical therapy assessment, plan of care and goals were reviewed.  ASSESSMENT  Patient continues with skilled PT services and is progressing towards goals. Pt seated on EOB with nursing upon PT arrival, agreeable to session. Pt A&O x 2, person and place. Cues needed for day and year as well as situation. Wife present in room for entire session with permission from patient. (See below for objective  Behavior:  Orientation  Overall Orientation Status: Impaired  Orientation Level: Oriented to person;Disoriented to time;Disoriented to situation;Oriented to place  Cognition  Overall Cognitive Status: Exceptions  Arousal/Alertness: Impaired;Delayed responses to stimuli  Following Commands: Inconsistently follows commands;Follows one step commands with increased time;Follows one step commands with repetition;Follows multistep commands with increased time;Follows multistep commands with repitition  Attention Span: Difficulty attending to directions  Memory: Impaired  Safety Judgement: Decreased awareness of need for assistance  Problem Solving: Decreased awareness of errors;Assistance required to identify errors made  Insights: Decreased awareness of deficits  Initiation: Requires cues for some  Sequencing: Requires cues for some    Functional Mobility Training:  Bed Mobility:  Bed Mobility Training  Bed Mobility Training: Yes  Interventions: Visual cues;Verbal cues;Manual cues  Sit to Supine: Moderate assistance;Assist X2;Additional time  Transfers:  Transfer Training  Transfer Training: Yes  Overall Level of Assistance: Moderate assistance;Assist X2;Additional time;Maximum assistance;Adaptive equipment  Interventions: Visual cues;Verbal cues;Manual cues;Weight shifting training/pressure relief  Sit to Stand: Moderate assistance;Maximum assistance;Assist X2;Additional time;Adaptive equipment  Stand to Sit: Moderate assistance;Maximum assistance;Assist X2;Additional time;Adaptive equipment  Balance:  Balance  Sitting: Intact  Sitting - Static: Good (unsupported)  Sitting - Dynamic: Good (unsupported)  Standing: Impaired  Standing - Static: Fair;Constant support  Standing - Dynamic: Fair;Constant support  Wheelchair Mobility:  Wheelchair Management  Wheelchair Management: No  Ambulation/Gait Training:  Gait  Gait Training: Yes  Overall Level of Assistance: Minimum assistance;Assist X2;Additional time;Adaptive

## 2024-07-27 NOTE — PROGRESS NOTES
0754: Urology consult called to Dr. Mora.     0830: Pt states he has not worked with PT/OT since he has been here and requesting to work with them and get up. Clarified with pt there are notes from PT/OT from yesterday. Perfect serve message sent to PT to see if they can see pt today per his request.     0900: Pt states he needs his oxycodone and  gabapentin. Discussed pt's request for medicines and lethargy overnight with Dr. Alvarez MD states he is aware and will speak with pt.

## 2024-07-27 NOTE — PROGRESS NOTES
RRT Clinical Rounding Nurse Progress Report      SUBJECTIVE: Patient assessed secondary to elevated Deterioration Index Score.      Vitals:    07/26/24 1708 07/26/24 1709 07/26/24 2030 07/26/24 2147   BP: 106/71  104/62    Pulse: (!) 111 (!) 112 (!) 109 (!) 109   Resp: 18 20 17   Temp: (!) 100.6 °F (38.1 °C) 99.3 °F (37.4 °C) 98.2 °F (36.8 °C)    TempSrc: Oral  Oral    SpO2: 92% 92% 97% 94%   Weight:       Height:            DETERIORATION INDEX SCORE: 54     ASSESSMENT:      PLAN:  Spoke to Viki QUIJANO about increased DI score of 54. Primary RN Iris to assess patient at this time.     Danni Hanna RN

## 2024-07-27 NOTE — PROGRESS NOTES
General Daily Progress Note      Patient Name:   Shantanu Casillas       YOB: 1955       Age:  68 y.o.      Admit Date: 7/25/2024      Subjective:     Patient is a 68 y.o. year old male morbidly obese history of COPD type 2 diabetes hypertension obstructive sleep apnea CHF ejection fraction 40 to 45% venous stasis ulcer came to emergency room because of generalized weakness had a fall no loss of consciousness or hitting head came to emergency room also complaining of generalized weakness last few days has had some back pain seen by the ER physician and recommended patient to be admitted for possible cellulitis of the both leg     Patient denies any chest pain but has shortness of breath on little exertion patient denies no fever no chills    7/27    Patient was obtunded last night blood sugars running high ordered CT scan of the head was negative CT scan of the abdomen shows left-sided nephrolithiasis no obstruction  Wound culture came back positive to gram-negative rods    Sodium 135 potassium 3.3 CO2 33 BUN 83 creatinine 2.79 blood sugars running 308 and 360    Take Lantus 90 units in the morning and 40 in the evening       Seen by podiatrist recommend to continue wound care continue IV antibiotics      Objective:     Vitals:    07/27/24 0756   BP: 121/71   Pulse: 99   Resp: 19   Temp: 98.2 °F (36.8 °C)   SpO2: 98%        Recent Results (from the past 24 hour(s))   POCT Glucose    Collection Time: 07/26/24 11:44 AM   Result Value Ref Range    POC Glucose 270 (H) 65 - 100 mg/dL    Performed by: Tono Mejia    POCT Glucose    Collection Time: 07/26/24  3:13 PM   Result Value Ref Range    POC Glucose 264 (H) 65 - 100 mg/dL    Performed by: Warren Naranjo    Magnesium    Collection Time: 07/26/24  4:48 PM   Result Value Ref Range    Magnesium 2.3 1.6 - 2.4 mg/dL   POCT Glucose    Collection Time: 07/26/24  5:12 PM   Result Value Ref Range    POC Glucose 281 (H) 65 - 100 mg/dL    Performed by:  0905    atorvastatin (LIPITOR) tablet 40 mg, 40 mg, Oral, Nightly, Ed Pino MD    b complex vitamins capsule 1 capsule, 1 capsule, Oral, Daily, Ed Pino MD, 1 capsule at 07/27/24 0905    [Held by provider] bumetanide (BUMEX) tablet 2 mg, 2 mg, Oral, BID, Ed Pino MD, 2 mg at 07/26/24 0950    [Held by provider] colchicine (COLCRYS) tablet 0.6 mg, 0.6 mg, Oral, Daily, Ed Pino MD    ferrous sulfate (IRON 325) tablet 325 mg, 325 mg, Oral, Daily with breakfast, Ed Pino MD, 325 mg at 07/27/24 0906    fluticasone (FLONASE) 50 MCG/ACT nasal spray 2 spray, 2 spray, Each Nostril, BID, Ed Pino MD, 2 spray at 07/26/24 0950    budesonide-formoterol (SYMBICORT) 160-4.5 MCG/ACT inhaler 2 puff, 2 puff, Inhalation, BID RT, 2 puff at 07/27/24 0731 **AND** tiotropium (SPIRIVA RESPIMAT) 2.5 MCG/ACT inhaler 2 puff, 2 puff, Inhalation, Daily RT, Ed Pino MD, 2 puff at 07/27/24 0732    [Held by provider] gabapentin (NEURONTIN) capsule 400 mg, 400 mg, Oral, 4x Daily, Ed Pino MD, 400 mg at 07/26/24 1418    [Held by provider] oxyCODONE HCl (OXY-IR) immediate release tablet 10 mg, 10 mg, Oral, 4x Daily, Ed Pino MD, 10 mg at 07/26/24 1418    pantoprazole (PROTONIX) tablet 40 mg, 40 mg, Oral, QAM AC, Ed Pino MD, 40 mg at 07/27/24 0641    potassium bicarb-citric acid (EFFER-K) effervescent tablet 40 mEq, 40 mEq, Oral, BID, Ed Pino MD, 40 mEq at 07/27/24 0905    vitamin E capsule 400 Units, 400 Units, Oral, BID, Ed Pino MD, 400 Units at 07/27/24 0905    insulin lispro (HUMALOG,ADMELOG) injection vial 0-16 Units, 0-16 Units, SubCUTAneous, TID WC, Ed Pino MD, 8 Units at 07/27/24 0904    insulin lispro (HUMALOG,ADMELOG) injection vial 0-4 Units, 0-4 Units, SubCUTAneous, Nightly, Ed Pino MD, 4 Units at 07/26/24 2050    glucose chewable tablet 16 g, 4 tablet, Oral, PRN, Ed Pino MD    dextrose bolus 10% 125

## 2024-07-27 NOTE — CONSULTS
[facility] NEUROLOGY CONSULTATION          Chief Complaint/Admission Diagnosis: Cellulitis [L03.90]  Cellulitis of lower extremity, unspecified laterality [L03.119]  Fatigue due to excessive exertion, initial encounter [T73.3XXA]     I have been asked to see this 68 y.o. male in neurological consultation by Ed Marsh MD  to render advice and opinion regarding twitching.      ?     Impression/Recommendations:   Patient is a 68 y.o. year old male morbidly obese history of COPD type 2 diabetes, hypertension, obstructive sleep apnea, CHF, ejection fraction 40 to 45%; CKD venous stasis ulcer came to emergency room due to infected legs. He noticed twitching in his arm arm and leg for 1 week. This coincides well with him discontinuing prednisone 1 week ago after using it for 9 months. His examination shows asterixis in both hands.  I believe that his metabolic derangements and abrupt discontinuation of prednisone are responsible for his tremors in hands. I would not recommend treating it with medications. This is not a seizure, so no need for MRI, or LP or EEG.  Improving metabolic derangements and also giving him time to reset his HPA axis will probably improve his tremors.  Thank you for the consult.     Yue Botello MD  Teleneurologist    HPI:   History was obtained from a review of the electronic record and from the patient and family.??   Patient is a 68 y.o. year old male morbidly obese history of COPD type 2 diabetes, hypertension, obstructive sleep apnea, CHF, ejection fraction 40 to 45% venous stasis ulcer came to emergency room because of generalized weakness had a fall no loss of consciousness or hitting head came to emergency room also complaining of generalized weakness last few days has had some back pain seen by the ER physician and recommended patient to be admitted for possible cellulitis of the both leg     Patient denies any chest pain but has shortness of breath on little  stones in the bladder. There is bladder wall prominence which is most likely due to underdistention. There is no free fluid, free air, or abscesses. No significant lymphadenopathy. No evidence of intestinal obstruction.   No evidence of acute appendicitis. There is a moderate amount of stool throughout the colon. The abdominal aorta is normal in caliber. No acute bony abnormalities.  No aggressive appearing bony lesions.  There are multilevel degenerative changes of the spine.     1.  No acute abnormalities. 2.  Left-sided nephrolithiasis is again noted. There is no evidence of urinary obstruction. Electronically signed by Amicus    CT HEAD WO CONTRAST    Result Date: 7/26/2024  EXAM: CT HEAD WO CONTRAST ACC#: [id number] INDICATION: Recent falls with new onset twitching, lethargy, slurred speech COMPARISON: 7/24/2017 TECHNIQUE: Routine CT of the head was performed without intravenous contrast. Coronal and sagittal reconstructions were generated. CT dose reduction was achieved through use of a standardized protocol tailored for this examination and automatic exposure control for dose modulation. FINDINGS: The ventricles are normal size and configuration. There is no mass, mass effect, or acute hemorrhage. There is no CT evidence of acute ischemia old infarct in the right temporal lobe is again noted. There is generalized atrophy. There is periventricular hypodensity compatible with chronic small vessel disease. Old lacunar infarct in the left basal ganglia is again noted. There are no acute bony abnormalities. Paranasal sinuses and mastoid air cells appear well-aerated.     No acute intracranial abnormalities. Electronically signed by Amicus    CT CERVICAL SPINE WO CONTRAST    Result Date: 7/25/2024  EXAM:  CT CERVICAL SPINE WITHOUT CONTRAST INDICATION: acute process in spinous processes. Reason for exam:->acute process in spinous processes COMPARISON: None. CONTRAST:  None. TECHNIQUE: Multislice helical CT

## 2024-07-27 NOTE — CONSULTS
g  17 g Oral Daily PRN Ed Pino MD        acetaminophen (TYLENOL) tablet 650 mg  650 mg Oral Q6H PRN Ed Pino MD   650 mg at 24 0919    Or    acetaminophen (TYLENOL) suppository 650 mg  650 mg Rectal Q6H PRN Ed Pino MD        heparin (porcine) injection 5,000 Units  5,000 Units SubCUTAneous 3 times per day Ed Pino MD   5,000 Units at 24 0642    piperacillin-tazobactam (ZOSYN) 4,500 mg in sodium chloride 0.9 % 100 mL IVPB (mini-bag)  4,500 mg IntraVENous q8h Ed Pino MD 25 mL/hr at 24 0903 4,500 mg at 24 0903      Patient PCP: Ed Pino MD  PMH:  has a past medical history of Arthritis, CAD (coronary artery disease), Chronic obstructive pulmonary disease (HCC), Diabetes (HCC), Gout, Hypertension, Ill-defined condition, and Sleep apnea.  PSH:   has a past surgical history that includes pacemaker; hx vein ablation adhesive; orthopedic surgery; Foot surgery; and Foot Debridement (Right, 12/15/2023).   FHX: family history includes Diabetes in his brother, mother, and sister; Heart Disease in his father and mother; Hypertension in his father and mother; Kidney Disease in his mother.   SHX:  reports that he has never smoked. He has never used smokeless tobacco. He reports that he does not currently use alcohol. He reports that he does not use drugs.     ROS:    Review of Systems   Constitutional: Negative.    HENT: Negative.     Respiratory:  Positive for shortness of breath.    Cardiovascular:  Positive for leg swelling.   Gastrointestinal: Negative.    Musculoskeletal: Negative.    Neurological: Negative.    Psychiatric/Behavioral: Negative.            Objective:     Vital Signs: Telemetry:    normal sinus rhythm Intake/Output:   /71   Pulse 99   Temp 98.2 °F (36.8 °C) (Oral)   Resp 19   Ht 1.803 m (5' 11\")   Wt (!) 163 kg (359 lb 5.6 oz)   SpO2 98%   BMI 50.12 kg/m²     Temp (24hrs), Av.9 °F (37.2 °C), Min:98.2 °F (36.8 °C),  other organisms                   Imaging:  CT CERVICAL SPINE WO CONTRAST    Result Date: 7/25/2024  EXAM:  CT CERVICAL SPINE WITHOUT CONTRAST INDICATION: acute process in spinous processes. Reason for exam:->acute process in spinous processes COMPARISON: None. CONTRAST:  None. TECHNIQUE: Multislice helical CT of the cervical spine was performed without intravenous contrast administration.  Sagittal and coronal reformats were generated.  CT dose reduction was achieved through use of a standardized protocol tailored for this examination and automatic exposure control for dose modulation. FINDINGS: Normal alignment. Vertebral body heights are preserved without evidence of an acute fracture. Mild to moderate degenerative disc disease throughout the cervical spine. No more than mild spinal canal stenosis at any level. Visualized soft tissues of the neck are unremarkable. Visualized lung apices are clear. Partially visualized left chest cardiac device.     1. No evidence of acute fracture. Electronically signed by Juanjose Corral    XR CHEST PORTABLE    Result Date: 7/25/2024  EXAM:  XR CHEST PORTABLE INDICATION:   acute process COMPARISON: Chest radiograph 3/29/2024. FINDINGS: AP radiograph of the chest was obtained. Left chest ICD with leads in the right atrium, right ventricle and coronary sinus. Small left pleural effusion and left basilar atelectasis. No pneumothorax. Heart size is within normal limits. No acute osseous abnormalities.     1. Small left pleural effusion and left basilar atelectasis. Electronically signed by Juanjose Corral       ARTERIAL BLOOD GAS  Recent Labs     07/26/24  2131   PHART 7.47*   [id number] 46*   PO2ART 67*   IBA1OXZ 32*   BEART 7.8*   L1MATKCT 93*   OXYHEM 92.4*   CARBOXHGBART 0.8*   METHGBART 0.3         IMPRESSION:   Acute on chronic respiratory failure with Hypoxia on nasal oxygen  Cellulitis of lower extremity mostly on the right with leg wound  COPD  Congestive heart failure  Chronic

## 2024-07-27 NOTE — CONSULTS
Urology Consult    Patient: Shantanu Casillas MRN: [id number]  SSN: [id number]    YOB: 1955  Age: 68 y.o.  Sex: male          Date of Consultation:  July 27, 2024  Requesting Physician: Ed Pino MD  Reason for Consultation: Kidney stones           Assessment/Plan:  Nonobstructing kidney stone in the lower pole of the left kidney  These kidney stones are nonobstructing and are not contributing to the patient's current acute illness.  Plan for outpatient follow-up to discuss elective treatment of kidney stones  2.  Urinary tract infection  Urinalysis with positive leukocyte Estrace.  Urine culture is currently pending.  Patient is being treated with IV Zosyn for his cellulitis which would also cover potential urinary tract infection.  Follow-up urine culture.     History of Present Illness:  Patient is a 68 y.o. male admitted 7/25/2024 to the hospital for Cellulitis [L03.90]  Cellulitis of lower extremity, unspecified laterality [L03.119]  Fatigue due to excessive exertion, initial encounter [T73.3XXA].  He is a 68-year-old male who was admitted to the medicine service for cellulitis.  Last evening the patient was tachycardic and mildly hypotensive and a stat CT abdomen pelvis and CT head were ordered for further evaluation.  CT scan showed a nonobstructing kidney stone which was previously imaged on prior cross-sectional imaging.  The patient is currently asymptomatic from his stones.  His complaints this morning revolve around wanting to sit up in bed.  Past Medical History:  Allergies   Allergen Reactions    Amitriptyline Angioedema    Naproxen      Other reaction(s): Unknown (comments)  Pt not sure of reaction    Sulfa Antibiotics Nausea And Vomiting      Prior to Admission medications    Medication Sig Start Date End Date Taking? Authorizing Provider   oxyBUTYnin (DITROPAN) 5 MG tablet Take 1 tablet by mouth every evening   Yes Provider, MD Doretha   metOLazone (ZAROXOLYN) 5  MG tablet Take 1 tablet by mouth 2 times daily Take 30min before bumex   Yes Doretha Mccarthy MD   Loratadine (CLARITIN PO) Take 1 tablet by mouth daily   Yes Doretha Mccarthy MD   ferrous sulfate (IRON 325) 325 (65 Fe) MG tablet Take 1 tablet by mouth daily (with breakfast)    Doretha Mccarthy MD   Insulin Glargine (TOUJEO MAX SOLOSTAR SC) Inject 40 Units into the skin 40 units HS 90 units morning    Doretha Mccarthy MD   predniSONE (DELTASONE) 20 MG tablet Take 1 tablet by mouth daily  Patient not taking: Reported on 7/26/2024    Doretha Mccarthy MD   potassium chloride (KLOR-CON) 20 MEQ packet Take 40 mEq by mouth 2 times daily    Doretha Mccarthy MD   ipratropium (ATROVENT) 0.03 % nasal spray 2 sprays by Each Nostril route 3 times daily 4/2/24   Ed Pino MD   pantoprazole (PROTONIX) 40 MG tablet Take 1 tablet by mouth every morning (before breakfast) 4/3/24   Ed Pino MD   oxyCODONE (ROXICODONE) 5 MG immediate release tablet Take 2 tablets by mouth 4 times daily.    Doretha Mccarthy MD   gabapentin (NEURONTIN) 400 MG capsule Take 1 capsule by mouth 4 times daily.    Doretha Mccarthy MD   allopurinol (ZYLOPRIM) 100 MG tablet Take 2 tablets by mouth daily 11/7/23 2/13/24  Sergei Kolb MD   ammonium lactate (LAC-HYDRIN) 12 % lotion APPLY TO THE AFFECTED AREA(S) TOPICALLY 3 TIMES DAILY 10/6/23   Doretha Mccarthy MD   bumetanide (BUMEX) 2 MG tablet Take 1 tablet by mouth 2 times daily 8/11/23   Doretha Mccarthy MD   albuterol sulfate HFA (PROVENTIL;VENTOLIN;PROAIR) 108 (90 Base) MCG/ACT inhaler Inhale 1 puff into the lungs every 4 hours as needed 9/14/22   Doretha Mccarthy MD   ascorbic acid (VITAMIN C) 500 MG tablet Take 2 tablets by mouth 2 times daily    Doretha Mccarthy MD   aspirin 81 MG chewable tablet Take 1 tablet by mouth daily    Doretha Mccarthy MD   atorvastatin (LIPITOR) 40 MG tablet Take 1 tablet by mouth nightly

## 2024-07-27 NOTE — PROGRESS NOTES
Nephrology follow-up        Patient: Shantanu Casillas MRN: [id number]  SSN: [id number]    YOB: 1955  Age: 68 y.o.  Sex: male      Subjective:   The patient is seen at the bedside.  Blood pressures are soft  On nasal cannula 3 L  Tachycardic  Afebrile  No leukocytosis  Worsening DEMARCO  Hypokalemia    Past Medical History:   Diagnosis Date    Arthritis     CAD (coronary artery disease)     Chronic obstructive pulmonary disease (HCC)     Diabetes (HCC)     Gout     Hypertension     Ill-defined condition     Sleep apnea     cpap     Past Surgical History:   Procedure Laterality Date    FOOT DEBRIDEMENT Right 12/15/2023    INCISION AND DRAINAGE RIGHT FIRST METATARSAL PHALANGEAL JOINT performed by Polo Oviedo DPM at [facility] MAIN OR    FOOT SURGERY      right heel - foriegn object    HX VEIN ABLATION ADHESIVE      ORTHOPEDIC SURGERY      back surgery    PACEMAKER      aicd -      Family History   Problem Relation Age of Onset    Diabetes Mother     Heart Disease Father     Hypertension Father     Diabetes Sister     Diabetes Brother     Hypertension Mother     Heart Disease Mother     Kidney Disease Mother      Social History     Tobacco Use    Smoking status: Never    Smokeless tobacco: Never   Substance Use Topics    Alcohol use: Not Currently      Current Facility-Administered Medications   Medication Dose Route Frequency Provider Last Rate Last Admin    [START ON 7/28/2024] insulin glargine (LANTUS) injection vial 90 Units  90 Units SubCUTAneous QAM Ed Pino MD        insulin glargine (LANTUS) injection vial 40 Units  40 Units SubCUTAneous Nightly Ed Pino MD        magnesium hydroxide (MILK OF MAGNESIA) 400 MG/5ML suspension 30 mL  30 mL Oral Once Orion Hameed MD        aluminum & magnesium hydroxide-simethicone (MAALOX) 200-200-20 MG/5ML suspension 30 mL  30 mL Oral Q6H PRN Orion Hameed MD        ipratropium 0.5 mg-albuterol 2.5 mg (DUONEB) nebulizer solution 1 Dose  1  MD        polyethylene glycol (GLYCOLAX) packet 17 g  17 g Oral Daily PRN Ed Pnio MD        acetaminophen (TYLENOL) tablet 650 mg  650 mg Oral Q6H PRN Ed Pino MD   650 mg at 07/27/24 0919    Or    acetaminophen (TYLENOL) suppository 650 mg  650 mg Rectal Q6H PRN Ed Pino MD        heparin (porcine) injection 5,000 Units  5,000 Units SubCUTAneous 3 times per day Ed Pino MD   5,000 Units at 07/27/24 0642    piperacillin-tazobactam (ZOSYN) 4,500 mg in sodium chloride 0.9 % 100 mL IVPB (mini-bag)  4,500 mg IntraVENous q8h Ed Pino MD 25 mL/hr at 07/27/24 0903 4,500 mg at 07/27/24 0903        Allergies   Allergen Reactions    Amitriptyline Angioedema    Naproxen      Other reaction(s): Unknown (comments)  Pt not sure of reaction    Sulfa Antibiotics Nausea And Vomiting       Review of Systems:  A comprehensive review of systems was negative except for that written in the History of Present Illness.    Objective:     Vitals:    07/27/24 0315 07/27/24 0600 07/27/24 0737 07/27/24 0756   BP: 96/80   121/71   Pulse: (!) 108  98 99   Resp:   21 19   Temp: 98.4 °F (36.9 °C)   98.2 °F (36.8 °C)   TempSrc: Oral   Oral   SpO2: 94%  99% 98%   Weight:  (!) 163 kg (359 lb 5.6 oz)     Height:            Physical Exam:  General: NAD  Eyes: sclera anicteric  Oral Cavity: No thrush or ulcers  Neck: no JVD  Chest: Fair bilateral air entry  Heart: normal sounds  Abdomen: soft and non tender   : no juares  Lower Extremities: Trace edema in bilateral venous stasis ulcers  Skin: no rash  Neuro: intact  Psychiatric: non-depressed          Assessment/Plan:     DEMARCO on chronic kidney disease stage IIIB/IV  2/2 prerenal azotemia from aggressive diuresis.  On admission BUN/creatinine 86/2.73  BUN/creatinine 83/2.79 today.  Baseline creatinine seems to be around 1.4-1.7  No significant volume overload, chest x-ray no pulmonary edema  On his baseline O2 requirement  CT abdomen no evidence of

## 2024-07-28 ENCOUNTER — APPOINTMENT (OUTPATIENT)
Facility: HOSPITAL | Age: 69
DRG: 602 | End: 2024-07-28
Payer: MEDICARE

## 2024-07-28 LAB
ALBUMIN SERPL-MCNC: 2.1 G/DL (ref 3.5–5)
ANION GAP SERPL CALC-SCNC: 8 MMOL/L (ref 5–15)
BACTERIA SPEC CULT: ABNORMAL
BASOPHILS # BLD: 0 K/UL (ref 0–0.1)
BASOPHILS NFR BLD: 1 % (ref 0–1)
BNP SERPL-MCNC: 1014 PG/ML
BUN SERPL-MCNC: 71 MG/DL (ref 6–20)
BUN/CREAT SERPL: 28 (ref 12–20)
CA-I BLD-MCNC: 8.7 MG/DL (ref 8.5–10.1)
CHLORIDE SERPL-SCNC: 96 MMOL/L (ref 97–108)
CO2 SERPL-SCNC: 29 MMOL/L (ref 21–32)
CREAT SERPL-MCNC: 2.58 MG/DL (ref 0.7–1.3)
DIFFERENTIAL METHOD BLD: ABNORMAL
EOSINOPHIL # BLD: 0.3 K/UL (ref 0–0.4)
EOSINOPHIL NFR BLD: 5 % (ref 0–7)
ERYTHROCYTE [DISTWIDTH] IN BLOOD BY AUTOMATED COUNT: 18.4 % (ref 11.5–14.5)
GLUCOSE BLD STRIP.AUTO-MCNC: 222 MG/DL (ref 65–100)
GLUCOSE BLD STRIP.AUTO-MCNC: 292 MG/DL (ref 65–100)
GLUCOSE BLD STRIP.AUTO-MCNC: 320 MG/DL (ref 65–100)
GLUCOSE BLD STRIP.AUTO-MCNC: 339 MG/DL (ref 65–100)
GLUCOSE SERPL-MCNC: 233 MG/DL (ref 65–100)
GRAM STN SPEC: ABNORMAL
GRAM STN SPEC: ABNORMAL
HCT VFR BLD AUTO: 33.3 % (ref 36.6–50.3)
HGB BLD-MCNC: 10.1 G/DL (ref 12.1–17)
IMM GRANULOCYTES # BLD AUTO: 0 K/UL (ref 0–0.04)
IMM GRANULOCYTES NFR BLD AUTO: 1 % (ref 0–0.5)
LYMPHOCYTES # BLD: 0.8 K/UL (ref 0.8–3.5)
LYMPHOCYTES NFR BLD: 13 % (ref 12–49)
Lab: ABNORMAL
MAGNESIUM SERPL-MCNC: 2.6 MG/DL (ref 1.6–2.4)
MCH RBC QN AUTO: 28.4 PG (ref 26–34)
MCHC RBC AUTO-ENTMCNC: 30.3 G/DL (ref 30–36.5)
MCV RBC AUTO: 93.5 FL (ref 80–99)
MONOCYTES # BLD: 0.8 K/UL (ref 0–1)
MONOCYTES NFR BLD: 12 % (ref 5–13)
NEUTS SEG # BLD: 4.4 K/UL (ref 1.8–8)
NEUTS SEG NFR BLD: 68 % (ref 32–75)
NRBC # BLD: 0 K/UL (ref 0–0.01)
NRBC BLD-RTO: 0 PER 100 WBC
PERFORMED BY:: ABNORMAL
PHOSPHATE SERPL-MCNC: 2.2 MG/DL (ref 2.6–4.7)
PLATELET # BLD AUTO: 235 K/UL (ref 150–400)
PMV BLD AUTO: 10.7 FL (ref 8.9–12.9)
POTASSIUM SERPL-SCNC: 3.9 MMOL/L (ref 3.5–5.1)
RBC # BLD AUTO: 3.56 M/UL (ref 4.1–5.7)
SODIUM SERPL-SCNC: 133 MMOL/L (ref 136–145)
WBC # BLD AUTO: 6.3 K/UL (ref 4.1–11.1)

## 2024-07-28 PROCEDURE — 6370000000 HC RX 637 (ALT 250 FOR IP): Performed by: FAMILY MEDICINE

## 2024-07-28 PROCEDURE — 83880 ASSAY OF NATRIURETIC PEPTIDE: CPT

## 2024-07-28 PROCEDURE — 6370000000 HC RX 637 (ALT 250 FOR IP): Performed by: INTERNAL MEDICINE

## 2024-07-28 PROCEDURE — 94640 AIRWAY INHALATION TREATMENT: CPT

## 2024-07-28 PROCEDURE — 2580000003 HC RX 258: Performed by: FAMILY MEDICINE

## 2024-07-28 PROCEDURE — 80069 RENAL FUNCTION PANEL: CPT

## 2024-07-28 PROCEDURE — 94761 N-INVAS EAR/PLS OXIMETRY MLT: CPT

## 2024-07-28 PROCEDURE — 36415 COLL VENOUS BLD VENIPUNCTURE: CPT

## 2024-07-28 PROCEDURE — 82962 GLUCOSE BLOOD TEST: CPT

## 2024-07-28 PROCEDURE — 6360000002 HC RX W HCPCS: Performed by: FAMILY MEDICINE

## 2024-07-28 PROCEDURE — 2700000000 HC OXYGEN THERAPY PER DAY

## 2024-07-28 PROCEDURE — 83735 ASSAY OF MAGNESIUM: CPT

## 2024-07-28 PROCEDURE — 85025 COMPLETE CBC W/AUTO DIFF WBC: CPT

## 2024-07-28 PROCEDURE — 71045 X-RAY EXAM CHEST 1 VIEW: CPT

## 2024-07-28 PROCEDURE — 1100000000 HC RM PRIVATE

## 2024-07-28 PROCEDURE — 2580000003 HC RX 258: Performed by: INTERNAL MEDICINE

## 2024-07-28 RX ORDER — ACETAMINOPHEN 325 MG/1
650 TABLET ORAL EVERY 4 HOURS PRN
Status: DISCONTINUED | OUTPATIENT
Start: 2024-07-28 | End: 2024-07-30 | Stop reason: HOSPADM

## 2024-07-28 RX ORDER — ACETAMINOPHEN 650 MG/1
650 SUPPOSITORY RECTAL EVERY 4 HOURS PRN
Status: DISCONTINUED | OUTPATIENT
Start: 2024-07-28 | End: 2024-07-30 | Stop reason: HOSPADM

## 2024-07-28 RX ORDER — ACETAMINOPHEN 325 MG/1
650 TABLET ORAL EVERY 6 HOURS PRN
Status: DISCONTINUED | OUTPATIENT
Start: 2024-07-28 | End: 2024-07-28

## 2024-07-28 RX ADMIN — INSULIN GLARGINE 90 UNITS: 100 INJECTION, SOLUTION SUBCUTANEOUS at 10:06

## 2024-07-28 RX ADMIN — INSULIN GLARGINE 40 UNITS: 100 INJECTION, SOLUTION SUBCUTANEOUS at 20:11

## 2024-07-28 RX ADMIN — ATORVASTATIN CALCIUM 40 MG: 40 TABLET, FILM COATED ORAL at 20:10

## 2024-07-28 RX ADMIN — INSULIN LISPRO 8 UNITS: 100 INJECTION, SOLUTION INTRAVENOUS; SUBCUTANEOUS at 12:15

## 2024-07-28 RX ADMIN — ALUMINUM HYDROXIDE, MAGNESIUM HYDROXIDE, AND SIMETHICONE 30 ML: 1200; 120; 1200 SUSPENSION ORAL at 12:15

## 2024-07-28 RX ADMIN — PANTOPRAZOLE SODIUM 40 MG: 40 TABLET, DELAYED RELEASE ORAL at 06:24

## 2024-07-28 RX ADMIN — Medication 2 PUFF: at 07:31

## 2024-07-28 RX ADMIN — GABAPENTIN 400 MG: 100 CAPSULE ORAL at 12:15

## 2024-07-28 RX ADMIN — SODIUM CHLORIDE, PRESERVATIVE FREE 10 ML: 5 INJECTION INTRAVENOUS at 20:11

## 2024-07-28 RX ADMIN — Medication 2 PUFF: at 07:30

## 2024-07-28 RX ADMIN — INSULIN LISPRO 4 UNITS: 100 INJECTION, SOLUTION INTRAVENOUS; SUBCUTANEOUS at 10:07

## 2024-07-28 RX ADMIN — INSULIN LISPRO 4 UNITS: 100 INJECTION, SOLUTION INTRAVENOUS; SUBCUTANEOUS at 20:10

## 2024-07-28 RX ADMIN — SODIUM CHLORIDE: 9 INJECTION, SOLUTION INTRAVENOUS at 10:20

## 2024-07-28 RX ADMIN — FERROUS SULFATE TAB 325 MG (65 MG ELEMENTAL FE) 325 MG: 325 (65 FE) TAB at 10:05

## 2024-07-28 RX ADMIN — POTASSIUM BICARBONATE 40 MEQ: 782 TABLET, EFFERVESCENT ORAL at 10:17

## 2024-07-28 RX ADMIN — FLUTICASONE PROPIONATE 2 SPRAY: 50 SPRAY, METERED NASAL at 10:13

## 2024-07-28 RX ADMIN — ASPIRIN 81 MG 81 MG: 81 TABLET ORAL at 10:05

## 2024-07-28 RX ADMIN — OXYCODONE HYDROCHLORIDE AND ACETAMINOPHEN 1000 MG: 500 TABLET ORAL at 20:10

## 2024-07-28 RX ADMIN — ALLOPURINOL 200 MG: 100 TABLET ORAL at 10:05

## 2024-07-28 RX ADMIN — HEPARIN SODIUM 5000 UNITS: 5000 INJECTION INTRAVENOUS; SUBCUTANEOUS at 06:24

## 2024-07-28 RX ADMIN — PIPERACILLIN AND TAZOBACTAM 4500 MG: 4; .5 INJECTION, POWDER, LYOPHILIZED, FOR SOLUTION INTRAVENOUS at 10:06

## 2024-07-28 RX ADMIN — HEPARIN SODIUM 5000 UNITS: 5000 INJECTION INTRAVENOUS; SUBCUTANEOUS at 12:18

## 2024-07-28 RX ADMIN — POLYETHYLENE GLYCOL 3350 17 G: 17 POWDER, FOR SOLUTION ORAL at 15:18

## 2024-07-28 RX ADMIN — POTASSIUM & SODIUM PHOSPHATES POWDER PACK 280-160-250 MG 500 MG: 280-160-250 PACK at 17:24

## 2024-07-28 RX ADMIN — PIPERACILLIN AND TAZOBACTAM 4500 MG: 4; .5 INJECTION, POWDER, LYOPHILIZED, FOR SOLUTION INTRAVENOUS at 17:24

## 2024-07-28 RX ADMIN — ACETAMINOPHEN 650 MG: 325 TABLET ORAL at 10:04

## 2024-07-28 RX ADMIN — FLUTICASONE PROPIONATE 2 SPRAY: 50 SPRAY, METERED NASAL at 20:13

## 2024-07-28 RX ADMIN — ACETAMINOPHEN 650 MG: 325 TABLET ORAL at 04:38

## 2024-07-28 RX ADMIN — PIPERACILLIN AND TAZOBACTAM 4500 MG: 4; .5 INJECTION, POWDER, LYOPHILIZED, FOR SOLUTION INTRAVENOUS at 00:52

## 2024-07-28 RX ADMIN — POTASSIUM & SODIUM PHOSPHATES POWDER PACK 280-160-250 MG 500 MG: 280-160-250 PACK at 22:49

## 2024-07-28 RX ADMIN — Medication 1 CAPSULE: at 10:05

## 2024-07-28 RX ADMIN — INSULIN LISPRO 12 UNITS: 100 INJECTION, SOLUTION INTRAVENOUS; SUBCUTANEOUS at 17:20

## 2024-07-28 RX ADMIN — SODIUM CHLORIDE, PRESERVATIVE FREE 10 ML: 5 INJECTION INTRAVENOUS at 10:17

## 2024-07-28 RX ADMIN — Medication 2 PUFF: at 19:29

## 2024-07-28 RX ADMIN — Medication 400 UNITS: at 20:10

## 2024-07-28 RX ADMIN — Medication 400 UNITS: at 10:05

## 2024-07-28 RX ADMIN — HEPARIN SODIUM 5000 UNITS: 5000 INJECTION INTRAVENOUS; SUBCUTANEOUS at 20:10

## 2024-07-28 RX ADMIN — ALUMINUM HYDROXIDE, MAGNESIUM HYDROXIDE, AND SIMETHICONE 30 ML: 1200; 120; 1200 SUSPENSION ORAL at 19:03

## 2024-07-28 RX ADMIN — POTASSIUM & SODIUM PHOSPHATES POWDER PACK 280-160-250 MG 500 MG: 280-160-250 PACK at 12:21

## 2024-07-28 RX ADMIN — OXYCODONE HYDROCHLORIDE AND ACETAMINOPHEN 1000 MG: 500 TABLET ORAL at 10:05

## 2024-07-28 RX ADMIN — ACETAMINOPHEN 650 MG: 325 TABLET ORAL at 18:51

## 2024-07-28 RX ADMIN — GABAPENTIN 400 MG: 100 CAPSULE ORAL at 20:09

## 2024-07-28 ASSESSMENT — PAIN SCALES - GENERAL
PAINLEVEL_OUTOF10: 3
PAINLEVEL_OUTOF10: 5
PAINLEVEL_OUTOF10: 3
PAINLEVEL_OUTOF10: 5
PAINLEVEL_OUTOF10: 8
PAINLEVEL_OUTOF10: 3
PAINLEVEL_OUTOF10: 9
PAINLEVEL_OUTOF10: 5

## 2024-07-28 ASSESSMENT — PAIN DESCRIPTION - ORIENTATION
ORIENTATION: LOWER
ORIENTATION: POSTERIOR
ORIENTATION: LOWER

## 2024-07-28 ASSESSMENT — PAIN DESCRIPTION - LOCATION
LOCATION: BACK;LEG;NECK
LOCATION: BACK;NECK;LEG
LOCATION: BACK
LOCATION: BACK;NECK

## 2024-07-28 ASSESSMENT — PAIN DESCRIPTION - DESCRIPTORS
DESCRIPTORS: ACHING;THROBBING
DESCRIPTORS: JABBING;SHARP
DESCRIPTORS: SHARP

## 2024-07-28 NOTE — PROGRESS NOTES
General Daily Progress Note      Patient Name:   Shantanu Casillas       YOB: 1955       Age:  68 y.o.      Admit Date: 7/25/2024      Subjective:     Patient is a 68 y.o. year old male morbidly obese history of COPD type 2 diabetes hypertension obstructive sleep apnea CHF ejection fraction 40 to 45% venous stasis ulcer came to emergency room because of generalized weakness had a fall no loss of consciousness or hitting head came to emergency room also complaining of generalized weakness last few days has had some back pain seen by the ER physician and recommended patient to be admitted for possible cellulitis of the both leg     Patient denies any chest pain but has shortness of breath on little exertion patient denies no fever no chills    7/27    Patient was obtunded last night blood sugars running high ordered CT scan of the head was negative CT scan of the abdomen shows left-sided nephrolithiasis no obstruction  Wound culture came back positive to gram-negative rods    Sodium 135 potassium 3.3 CO2 33 BUN 83 creatinine 2.79 blood sugars running 308 and 360    Take Lantus 90 units in the morning and 40 in the evening       Seen by podiatrist recommend to continue wound care continue IV antibiotics    7/28    Patient resting in bed alert awake denies any chest pain or shortness of breath or nausea or vomiting feeling much better    Seen by the neurology, recommend to monitor regarding twitching of the hands    Seen by the pulmonologist    Urine culture came back positive for gram-negative rods  And wound culture also positive for gram-negative dawson    Today's labs are pending      Objective:     Vitals:    07/28/24 0745   BP: 130/82   Pulse: 94   Resp: 18   Temp: 98.1 °F (36.7 °C)   SpO2: 99%        Recent Results (from the past 24 hour(s))   POCT Glucose    Collection Time: 07/27/24  8:43 AM   Result Value Ref Range    POC Glucose 263 (H) 65 - 100 mg/dL    Performed by: Vic Pritchett   dextrose 10 % infusion, , IntraVENous, Continuous PRN, Ed Pino MD    sodium chloride flush 0.9 % injection 5-40 mL, 5-40 mL, IntraVENous, 2 times per day, Ed Pino MD, 10 mL at 07/27/24 0906    sodium chloride flush 0.9 % injection 5-40 mL, 5-40 mL, IntraVENous, PRN, Ed Pino MD    0.9 % sodium chloride infusion, , IntraVENous, PRN, Ed Pino MD    potassium chloride (KLOR-CON M) extended release tablet 40 mEq, 40 mEq, Oral, PRN **OR** potassium bicarb-citric acid (EFFER-K) effervescent tablet 40 mEq, 40 mEq, Oral, PRN **OR** potassium chloride 10 mEq/100 mL IVPB (Peripheral Line), 10 mEq, IntraVENous, PRN, Ed Pino MD    magnesium sulfate 2000 mg in 50 mL IVPB premix, 2,000 mg, IntraVENous, PRN, Ed Pino MD    ondansetron (ZOFRAN-ODT) disintegrating tablet 4 mg, 4 mg, Oral, Q8H PRN **OR** ondansetron (ZOFRAN) injection 4 mg, 4 mg, IntraVENous, Q6H PRN, Ed Pino MD    polyethylene glycol (GLYCOLAX) packet 17 g, 17 g, Oral, Daily PRN, Ed Pino MD, 17 g at 07/27/24 2058    acetaminophen (TYLENOL) tablet 650 mg, 650 mg, Oral, Q6H PRN, 650 mg at 07/28/24 0438 **OR** acetaminophen (TYLENOL) suppository 650 mg, 650 mg, Rectal, Q6H PRN, Ed Pino MD    heparin (porcine) injection 5,000 Units, 5,000 Units, SubCUTAneous, 3 times per day, Ed Pino MD, 5,000 Units at 07/28/24 0624    piperacillin-tazobactam (ZOSYN) 4,500 mg in sodium chloride 0.9 % 100 mL IVPB (mini-bag), 4,500 mg, IntraVENous, q8h, Ed Pino MD, Stopped at 07/28/24 0456

## 2024-07-28 NOTE — CONSULTS
PULMONARY CONSULT  [facility]    Name: Shantanu Casillas MRN: [id number]   : 1955 Hospital: [facility]   Date: 2024  Admission date: 2024 Hospital Day: 4       HPI:     Patient Active Problem List   Diagnosis    Onychomycosis    Chronic ulcer of left leg, with fat layer exposed (HCC)    Idiopathic chronic venous hypertension of left lower extremity with ulcer (HCC)    Ulcerated, foot, left, with fat layer exposed (HCC)    Cellulitis    COVID-19    Venous ulcer (HCC)    Hyperkeratosis    Localized edema    Type 2 diabetes mellitus with diabetic polyneuropathy, with long-term current use of insulin (HCC)    Cellulitis and abscess of right leg    Chronic ulcer of right heel limited to breakdown of skin (HCC)    Ulcer of right leg, with fat layer exposed (HCC)    Ankle ulcer, right, with fat layer exposed (HCC)    Non-pressure chronic ulcer of other part of right lower leg with fat layer exposed (HCC)    Back pain    Acute on chronic systolic CHF (congestive heart failure), NYHA class 2 (HCC)    Shortness of breath    Acute on chronic congestive heart failure (HCC)    Wound infection    Venous stasis ulcer (HCC)    Open wound of right foot    Atherosclerotic heart disease of native coronary artery without angina pectoris    Chronic obstructive pulmonary disease, unspecified (HCC)    Chronic pain disorder    Dependence on supplemental oxygen    Difficulty in walking, not elsewhere classified    Gout, unspecified    Hypertensive heart disease with heart failure (HCC)    Morbid (severe) obesity due to excess calories (HCC)    Muscle weakness (generalized)    Obstructive sleep apnea (adult) (pediatric)    Other lack of coordination    Other symptoms and signs involving the musculoskeletal system    Personal history of COVID-19    Presence of cardiac pacemaker    Type 2 diabetes mellitus with diabetic peripheral angiopathy without gangrene (HCC)    Unspecified cirrhosis of liver (HCC)     Unspecified osteoarthritis, unspecified site    Venous insufficiency (chronic) (peripheral)    Acute pneumonia    CAP (community acquired pneumonia) due to Chlamydia species    Generalized edema    Ulcer of right foot with fat layer exposed (HCC)             [x] High complexity decision making was performed  [x] See my orders for details      Subjective/Initial History:     I was asked by Ed Pino MD to see Shantanu Casillas  a 68 y.o.   male in consultation     Excerpts from admission 7/25/2024 or consult notes as follows:   68-year-old male came in because of swelling of the lower extremities he has chronic leg edema he was in the rehab facility he has significant history of COPD chronic hypoxic hypercapnic respiratory failure he is on trilogy noninvasive ventilator he was having some shortness of breath but mostly was still swelling in the lower extremity which she had previous admission in the past so admitted and pulmonary consult was called      Allergies   Allergen Reactions    Amitriptyline Angioedema    Naproxen      Other reaction(s): Unknown (comments)  Pt not sure of reaction    Sulfa Antibiotics Nausea And Vomiting        MAR reviewed and pertinent medications noted or modified as needed     Current Facility-Administered Medications   Medication Dose Route Frequency Provider Last Rate Last Admin    insulin glargine (LANTUS) injection vial 90 Units  90 Units SubCUTAneous QAM Ed Pino MD   90 Units at 07/28/24 1006    insulin glargine (LANTUS) injection vial 40 Units  40 Units SubCUTAneous Nightly Ed Pino MD   40 Units at 07/27/24 2047    aluminum & magnesium hydroxide-simethicone (MAALOX) 200-200-20 MG/5ML suspension 30 mL  30 mL Oral Q6H PRN Orion Hameed MD        0.9 % sodium chloride infusion   IntraVENous Continuous Bhupendra Hayden MD 75 mL/hr at 07/28/24 1020 New Bag at 07/28/24 1020    ipratropium 0.5 mg-albuterol 2.5 mg (DUONEB) nebulizer solution 1 Dose

## 2024-07-28 NOTE — PLAN OF CARE
Problem: Discharge Planning  Goal: Discharge to home or other facility with appropriate resources  Outcome: Progressing  Flowsheets (Taken 7/27/2024 2030)  Discharge to home or other facility with appropriate resources: Identify barriers to discharge with patient and caregiver     Problem: Pain  Goal: Verbalizes/displays adequate comfort level or baseline comfort level  Outcome: Progressing     Problem: Skin/Tissue Integrity  Goal: Absence of new skin breakdown  Description: 1.  Monitor for areas of redness and/or skin breakdown  2.  Assess vascular access sites hourly  3.  Every 4-6 hours minimum:  Change oxygen saturation probe site  4.  Every 4-6 hours:  If on nasal continuous positive airway pressure, respiratory therapy assess nares and determine need for appliance change or resting period.  Outcome: Progressing     Problem: Safety - Adult  Goal: Free from fall injury  Outcome: Progressing     Problem: Chronic Conditions and Co-morbidities  Goal: Patient's chronic conditions and co-morbidity symptoms are monitored and maintained or improved  Outcome: Progressing     Problem: Physical Therapy - Adult  Goal: By Discharge: Performs mobility at highest level of function for planned discharge setting.  See evaluation for individualized goals.  Description: FUNCTIONAL STATUS PRIOR TO ADMISSION: Patient was modified independent using a rollator for functional mobility.    HOME SUPPORT PRIOR TO ADMISSION: The patient lived with spouse and required minimal assistance for ADLs.    Physical Therapy Goals  Initiated 7/26/2024  Pt stated goal: to go home  Pt will be I with LE HEP in 7 days.  Pt will perform bed mobility with Modified [place] in 7 days.  Pt will perform transfers with Contact Guard Assist in 7 days.   Pt will amb 10-25 feet with LRAD safely with Minimal Assist in 7 days.  Pt will demonstrate improvement in standing balance from poor to fair in 7 days.     7/27/2024 1527 by Tiffani Morris,

## 2024-07-28 NOTE — PROGRESS NOTES
Nephrology follow-up        Patient: Shantanu Casillas MRN: [id number]  SSN: [id number]    YOB: 1955  Age: 68 y.o.  Sex: male      Subjective:   The patient is seen at the bedside.  Blood pressures are better  On nasal cannula 3 L  Afebrile  No leukocytosis  Resolving DEMARCO    Past Medical History:   Diagnosis Date    Arthritis     CAD (coronary artery disease)     Chronic obstructive pulmonary disease (HCC)     Diabetes (HCC)     Gout     Hypertension     Ill-defined condition     Sleep apnea     cpap     Past Surgical History:   Procedure Laterality Date    FOOT DEBRIDEMENT Right 12/15/2023    INCISION AND DRAINAGE RIGHT FIRST METATARSAL PHALANGEAL JOINT performed by Polo Oviedo DPM at [facility] MAIN OR    FOOT SURGERY      right heel - foriegn object    HX VEIN ABLATION ADHESIVE      ORTHOPEDIC SURGERY      back surgery    PACEMAKER      aicd -      Family History   Problem Relation Age of Onset    Diabetes Mother     Heart Disease Father     Hypertension Father     Diabetes Sister     Diabetes Brother     Hypertension Mother     Heart Disease Mother     Kidney Disease Mother      Social History     Tobacco Use    Smoking status: Never    Smokeless tobacco: Never   Substance Use Topics    Alcohol use: Not Currently      Current Facility-Administered Medications   Medication Dose Route Frequency Provider Last Rate Last Admin    potassium & sodium phosphates (PHOS-NAK) 280-160-250 MG packet 500 mg  2 packet Oral Q6H Orion Hameed MD        insulin glargine (LANTUS) injection vial 90 Units  90 Units SubCUTAneous QAM Ed Pino MD   90 Units at 07/28/24 1006    insulin glargine (LANTUS) injection vial 40 Units  40 Units SubCUTAneous Nightly Ed Pino MD   40 Units at 07/27/24 2047    aluminum & magnesium hydroxide-simethicone (MAALOX) 200-200-20 MG/5ML suspension 30 mL  30 mL Oral Q6H PRN Orion Hameed MD        0.9 % sodium chloride infusion   IntraVENous Continuous [place],

## 2024-07-29 LAB
ALBUMIN SERPL-MCNC: 1.9 G/DL (ref 3.5–5)
ALBUMIN/GLOB SERPL: 0.4 (ref 1.1–2.2)
ALP SERPL-CCNC: 79 U/L (ref 45–117)
ALT SERPL-CCNC: 41 U/L (ref 12–78)
ANION GAP SERPL CALC-SCNC: 6 MMOL/L (ref 5–15)
AST SERPL W P-5'-P-CCNC: 46 U/L (ref 15–37)
BACTERIA SPEC CULT: ABNORMAL
BILIRUB SERPL-MCNC: 0.6 MG/DL (ref 0.2–1)
BNP SERPL-MCNC: 2110 PG/ML
BUN SERPL-MCNC: 54 MG/DL (ref 6–20)
BUN/CREAT SERPL: 26 (ref 12–20)
CA-I BLD-MCNC: 9.1 MG/DL (ref 8.5–10.1)
CHLORIDE SERPL-SCNC: 100 MMOL/L (ref 97–108)
CO2 SERPL-SCNC: 30 MMOL/L (ref 21–32)
COLONY COUNT, CNT: ABNORMAL
CREAT SERPL-MCNC: 2.06 MG/DL (ref 0.7–1.3)
ERYTHROCYTE [DISTWIDTH] IN BLOOD BY AUTOMATED COUNT: 18.4 % (ref 11.5–14.5)
GLOBULIN SER CALC-MCNC: 5.2 G/DL (ref 2–4)
GLUCOSE BLD STRIP.AUTO-MCNC: 158 MG/DL (ref 65–100)
GLUCOSE BLD STRIP.AUTO-MCNC: 212 MG/DL (ref 65–100)
GLUCOSE BLD STRIP.AUTO-MCNC: 287 MG/DL (ref 65–100)
GLUCOSE BLD STRIP.AUTO-MCNC: 302 MG/DL (ref 65–100)
GLUCOSE SERPL-MCNC: 141 MG/DL (ref 65–100)
HCT VFR BLD AUTO: 35.9 % (ref 36.6–50.3)
HGB BLD-MCNC: 10.8 G/DL (ref 12.1–17)
Lab: ABNORMAL
MCH RBC QN AUTO: 28.3 PG (ref 26–34)
MCHC RBC AUTO-ENTMCNC: 30.1 G/DL (ref 30–36.5)
MCV RBC AUTO: 94.2 FL (ref 80–99)
MRSA DNA SPEC QL NAA+PROBE: DETECTED
NRBC # BLD: 0 K/UL (ref 0–0.01)
NRBC BLD-RTO: 0 PER 100 WBC
PERFORMED BY:: ABNORMAL
PLATELET # BLD AUTO: 220 K/UL (ref 150–400)
PMV BLD AUTO: 10.4 FL (ref 8.9–12.9)
POTASSIUM SERPL-SCNC: 4.3 MMOL/L (ref 3.5–5.1)
PROT SERPL-MCNC: 7.1 G/DL (ref 6.4–8.2)
RBC # BLD AUTO: 3.81 M/UL (ref 4.1–5.7)
SODIUM SERPL-SCNC: 136 MMOL/L (ref 136–145)
WBC # BLD AUTO: 5.7 K/UL (ref 4.1–11.1)

## 2024-07-29 PROCEDURE — 36415 COLL VENOUS BLD VENIPUNCTURE: CPT

## 2024-07-29 PROCEDURE — 6360000002 HC RX W HCPCS: Performed by: FAMILY MEDICINE

## 2024-07-29 PROCEDURE — 87641 MR-STAPH DNA AMP PROBE: CPT

## 2024-07-29 PROCEDURE — 83880 ASSAY OF NATRIURETIC PEPTIDE: CPT

## 2024-07-29 PROCEDURE — 80053 COMPREHEN METABOLIC PANEL: CPT

## 2024-07-29 PROCEDURE — 94761 N-INVAS EAR/PLS OXIMETRY MLT: CPT

## 2024-07-29 PROCEDURE — 6370000000 HC RX 637 (ALT 250 FOR IP): Performed by: INTERNAL MEDICINE

## 2024-07-29 PROCEDURE — 2700000000 HC OXYGEN THERAPY PER DAY

## 2024-07-29 PROCEDURE — 1100000000 HC RM PRIVATE

## 2024-07-29 PROCEDURE — 2580000003 HC RX 258: Performed by: FAMILY MEDICINE

## 2024-07-29 PROCEDURE — 85027 COMPLETE CBC AUTOMATED: CPT

## 2024-07-29 PROCEDURE — 6370000000 HC RX 637 (ALT 250 FOR IP): Performed by: FAMILY MEDICINE

## 2024-07-29 PROCEDURE — 94640 AIRWAY INHALATION TREATMENT: CPT

## 2024-07-29 PROCEDURE — 82962 GLUCOSE BLOOD TEST: CPT

## 2024-07-29 RX ORDER — GABAPENTIN 300 MG/1
300 CAPSULE ORAL 3 TIMES DAILY
Status: DISCONTINUED | OUTPATIENT
Start: 2024-07-29 | End: 2024-07-30 | Stop reason: HOSPADM

## 2024-07-29 RX ADMIN — ACETAMINOPHEN 650 MG: 325 TABLET ORAL at 05:52

## 2024-07-29 RX ADMIN — HEPARIN SODIUM 5000 UNITS: 5000 INJECTION INTRAVENOUS; SUBCUTANEOUS at 05:47

## 2024-07-29 RX ADMIN — ATORVASTATIN CALCIUM 40 MG: 40 TABLET, FILM COATED ORAL at 20:31

## 2024-07-29 RX ADMIN — Medication 400 UNITS: at 09:02

## 2024-07-29 RX ADMIN — ALLOPURINOL 200 MG: 100 TABLET ORAL at 09:01

## 2024-07-29 RX ADMIN — SODIUM CHLORIDE, PRESERVATIVE FREE 10 ML: 5 INJECTION INTRAVENOUS at 09:02

## 2024-07-29 RX ADMIN — Medication 2 PUFF: at 19:33

## 2024-07-29 RX ADMIN — HEPARIN SODIUM 5000 UNITS: 5000 INJECTION INTRAVENOUS; SUBCUTANEOUS at 15:26

## 2024-07-29 RX ADMIN — SODIUM CHLORIDE 2500 MG: 9 INJECTION, SOLUTION INTRAVENOUS at 09:35

## 2024-07-29 RX ADMIN — FERROUS SULFATE TAB 325 MG (65 MG ELEMENTAL FE) 325 MG: 325 (65 FE) TAB at 09:01

## 2024-07-29 RX ADMIN — INSULIN LISPRO 4 UNITS: 100 INJECTION, SOLUTION INTRAVENOUS; SUBCUTANEOUS at 12:50

## 2024-07-29 RX ADMIN — OXYCODONE HYDROCHLORIDE AND ACETAMINOPHEN 1000 MG: 500 TABLET ORAL at 20:31

## 2024-07-29 RX ADMIN — ACETAMINOPHEN 650 MG: 325 TABLET ORAL at 22:53

## 2024-07-29 RX ADMIN — PIPERACILLIN AND TAZOBACTAM 4500 MG: 4; .5 INJECTION, POWDER, LYOPHILIZED, FOR SOLUTION INTRAVENOUS at 00:54

## 2024-07-29 RX ADMIN — FLUTICASONE PROPIONATE 2 SPRAY: 50 SPRAY, METERED NASAL at 20:30

## 2024-07-29 RX ADMIN — GABAPENTIN 300 MG: 300 CAPSULE ORAL at 20:31

## 2024-07-29 RX ADMIN — FLUTICASONE PROPIONATE 2 SPRAY: 50 SPRAY, METERED NASAL at 09:12

## 2024-07-29 RX ADMIN — INSULIN GLARGINE 40 UNITS: 100 INJECTION, SOLUTION SUBCUTANEOUS at 20:37

## 2024-07-29 RX ADMIN — INSULIN LISPRO 12 UNITS: 100 INJECTION, SOLUTION INTRAVENOUS; SUBCUTANEOUS at 17:33

## 2024-07-29 RX ADMIN — ACETAMINOPHEN 650 MG: 325 TABLET ORAL at 15:25

## 2024-07-29 RX ADMIN — CEFEPIME 2000 MG: 2 INJECTION, POWDER, FOR SOLUTION INTRAVENOUS at 11:52

## 2024-07-29 RX ADMIN — GABAPENTIN 300 MG: 300 CAPSULE ORAL at 15:26

## 2024-07-29 RX ADMIN — POTASSIUM BICARBONATE 40 MEQ: 782 TABLET, EFFERVESCENT ORAL at 09:01

## 2024-07-29 RX ADMIN — POTASSIUM BICARBONATE 40 MEQ: 782 TABLET, EFFERVESCENT ORAL at 20:30

## 2024-07-29 RX ADMIN — ASPIRIN 81 MG 81 MG: 81 TABLET ORAL at 09:02

## 2024-07-29 RX ADMIN — Medication 2 PUFF: at 09:11

## 2024-07-29 RX ADMIN — INSULIN GLARGINE 90 UNITS: 100 INJECTION, SOLUTION SUBCUTANEOUS at 09:01

## 2024-07-29 RX ADMIN — GABAPENTIN 400 MG: 100 CAPSULE ORAL at 09:02

## 2024-07-29 RX ADMIN — PANTOPRAZOLE SODIUM 40 MG: 40 TABLET, DELAYED RELEASE ORAL at 05:47

## 2024-07-29 RX ADMIN — Medication 400 UNITS: at 20:31

## 2024-07-29 RX ADMIN — Medication 1 CAPSULE: at 09:01

## 2024-07-29 RX ADMIN — OXYCODONE HYDROCHLORIDE AND ACETAMINOPHEN 1000 MG: 500 TABLET ORAL at 09:02

## 2024-07-29 RX ADMIN — HEPARIN SODIUM 5000 UNITS: 5000 INJECTION INTRAVENOUS; SUBCUTANEOUS at 20:31

## 2024-07-29 ASSESSMENT — PAIN SCALES - GENERAL
PAINLEVEL_OUTOF10: 9
PAINLEVEL_OUTOF10: 2
PAINLEVEL_OUTOF10: 4
PAINLEVEL_OUTOF10: 0
PAINLEVEL_OUTOF10: 6
PAINLEVEL_OUTOF10: 0

## 2024-07-29 ASSESSMENT — PAIN SCALES - WONG BAKER: WONGBAKER_NUMERICALRESPONSE: NO HURT

## 2024-07-29 ASSESSMENT — PAIN DESCRIPTION - LOCATION
LOCATION: BACK
LOCATION: NECK;BACK;LEG

## 2024-07-29 ASSESSMENT — PAIN DESCRIPTION - ORIENTATION
ORIENTATION: LOWER
ORIENTATION: LEFT;POSTERIOR

## 2024-07-29 ASSESSMENT — PAIN DESCRIPTION - DESCRIPTORS
DESCRIPTORS: ACHING
DESCRIPTORS: THROBBING;DISCOMFORT

## 2024-07-29 ASSESSMENT — ENCOUNTER SYMPTOMS
GASTROINTESTINAL NEGATIVE: 1
SHORTNESS OF BREATH: 1

## 2024-07-29 NOTE — PROGRESS NOTES
Vancomycin Dosing Consult  Shantanu Casillas is a 68 y.o. male with SSTI of both legs. Pharmacy was consulted by Dr. Pino to dose and monitor vancomycin. Today is day 1.    Antibiotic regimen: Vancomycin + Pip/Tazo (Started  - 4500mg q8h)    Temp (24hrs), Av.7 °F (36.5 °C), Min:97.5 °F (36.4 °C), Max:97.9 °F (36.6 °C)    Recent Labs     24  0518 24  0938 24  0725   WBC 9.4 6.3 5.7   CREATININE 2.79* 2.58* 2.06*   BUN 83* 71* 54*     Est CrCl: 53 mL/min  Baseline Scr (1.2-1.7)  Concomitant nephrotoxic drugs: None    Cultures:   Urine Culture : Enterobacter cloacae complex (Resistant to ceftriaxone)  Wound Culture : MRSA, Enterobacter cloacae,   Blood culture : NGTD     MRSA Swab: Pending    Target range: Re-dose for random level <15 mcg/mL    Recent level history:  Date/Time Dose & Interval Measured Level (mcg/mL) Associated AUC/POWER              Assessment/Plan:   Patient with DEMARCO (Scr 2.06) and downtrending to baseline (1.2-1.7)  Pulse dosing for now  Vanc level ordered for  at 0900  BMP ordered for 3 days through   Antimicrobial stop date

## 2024-07-29 NOTE — PROGRESS NOTES
Blood Updated: 07/28/24 0830     Special Requests No Special Requests        Culture No growth 2 days       Culture, Blood 1 [[id number]] Collected: 07/26/24 0151    Order Status: Completed Specimen: Blood Updated: 07/28/24 0830     Special Requests --        No Special Requests  Left  Hand       Culture No growth 2 days       Culture, Wound [[id number]]  (Abnormal)  (Susceptibility) Collected: 07/26/24 0139    Order Status: Completed Specimen: Leg, Right Updated: 07/29/24 0858     Special Requests No Special Requests        Gram Stain Occasional WBCs seen         1+ Gram Negative Rods               Few Gram Positive Cocci in clusters           Culture       Moderate Enterobacter cloacae complex                  Moderate Methicillin Resistant Staphylococcus aureus                  Heavy Mixed skin ady isolated          Susceptibility        Enterobacter cloacae complex      BACTERIAL SUSCEPTIBILITY PANEL POWER      amikacin <=2 ug/mL Sensitive      ceFAZolin >=64 ug/mL Resistant      cefepime <=1 ug/mL Sensitive      cefTAZidime >=64 ug/mL Resistant      cefTRIAXone >=64 ug/mL Resistant      ciprofloxacin >=4 ug/mL Resistant      gentamicin <=1 ug/mL Sensitive      levofloxacin >=8 ug/mL Resistant      tobramycin <=1 ug/mL Sensitive      trimethoprim-sulfamethoxazole <=20 ug/mL Sensitive                       Susceptibility        Methicillin-Resistant Staphylococcus aureus      BACTERIAL SUSCEPTIBILITY PANEL POWER      ciprofloxacin >=8 ug/mL Resistant      clindamycin >=4 ug/mL Resistant      DAPTOmycin 0.5 ug/mL Sensitive      doxycycline 1 ug/mL Sensitive      erythromycin >=8 ug/mL Resistant      gentamicin <=0.5 ug/mL Sensitive      Inducible Clindamycin Negative ug/mL  [1]        levofloxacin >=8 ug/mL Resistant      linezolid 2 ug/mL Sensitive      oxacillin >=4 ug/mL Resistant      rifampin <=0.5 ug/mL Sensitive  [2]       tetracycline 2 ug/mL Sensitive      trimethoprim-sulfamethoxazole <=10 ug/mL Sensitive   WITHOUT CONTRAST INDICATION: acute process in spinous processes. Reason for exam:->acute process in spinous processes COMPARISON: None. CONTRAST:  None. TECHNIQUE: Multislice helical CT of the cervical spine was performed without intravenous contrast administration.  Sagittal and coronal reformats were generated.  CT dose reduction was achieved through use of a standardized protocol tailored for this examination and automatic exposure control for dose modulation. FINDINGS: Normal alignment. Vertebral body heights are preserved without evidence of an acute fracture. Mild to moderate degenerative disc disease throughout the cervical spine. No more than mild spinal canal stenosis at any level. Visualized soft tissues of the neck are unremarkable. Visualized lung apices are clear. Partially visualized left chest cardiac device.     1. No evidence of acute fracture. Electronically signed by Juanjose Corral    XR CHEST PORTABLE    Result Date: 7/25/2024  EXAM:  XR CHEST PORTABLE INDICATION:   acute process COMPARISON: Chest radiograph 3/29/2024. FINDINGS: AP radiograph of the chest was obtained. Left chest ICD with leads in the right atrium, right ventricle and coronary sinus. Small left pleural effusion and left basilar atelectasis. No pneumothorax. Heart size is within normal limits. No acute osseous abnormalities.     1. Small left pleural effusion and left basilar atelectasis. Electronically signed by Juanjose Corral       ARTERIAL BLOOD GAS  Recent Labs     07/26/24  2131   PHART 7.47*   RFX4VBG 46*   PO2ART 67*   [id number] 32*   BEART 7.8*   [id number] 93*   OXYHEM 92.4*   CARBOXHGBART 0.8*   METHGBART 0.3           IMPRESSION:   Acute on chronic respiratory failure with Hypoxia on nasal oxygen  Cellulitis of lower extremity mostly on the right with leg wound  COPD  Hypophosphatemia to be repleted  Congestive heart failure  Chronic kidney disease stage IV  Chronic leg edema cellulitis right lower extremity venous

## 2024-07-29 NOTE — CONSULTS
Cardiology Consult    NAME: Shantanu Casillas   :  1955   MRN:  [id number]     Date/Time:  2024 9:44 AM    Patient PCP: Ed Pino MD  ________________________________________________________________________    Problem List  -Admitted to the hospital with shortness of breath and leg swelling.  -Cardiology consult was invited secondary to history of congestive heart failure and low ejection fraction of 40 to 45%.  -No known established coronary artery disease  -Leg swelling infection and chronic wound.  -Venous and insufficiency and varicose veins  -Diabetes mellitus  -Hypertension  -Dyslipidemia  -Arthritis  -Obesity  -Gout  -Sleep apnea         Assessment and Plan:   Note dictated 2024  -68-year-old white male with no known established coronary artery disease status post AICD implantation secondary to nonischemic cardiomyopathy admitted to the hospital with leg swelling and shortness of breath.  -Initial diagnosis of acute on chronic systolic heart failure was made and patient was admitted to the hospital.  -When I saw the patient he was sitting up in the bed and has no complaints and has numerous questions which I answered to the best of my ability.  -Currently patient is concerned about leg swelling and varicose veins which I explained to him would be done as an outpatient.  -I also explained to him that he needs wound infection clearance from the lower extremity and possible underlying peripheral arterial disease evaluation and management..  -Based on my current clinical assessment will optimize guideline directed medical management for cardiomyopathy and increase doses as tolerated per hemodynamics.  -We will continue to follow while patient is in the hospital along with the team with conservative approach at this time.    Thank you for the consultation and letting me participate in the care of our mutual patient.        []        High complexity decision making was

## 2024-07-29 NOTE — CARE COORDINATION
DCP: from home with wife and [facility]; CM to see pt and discuss therapy recs for SNF, pt does not require auth    1052: post IDR update- pt anticipated to remain inpt another 48 hours awaiting pulm, nephro, and podiatry clearances. CM to meet with pt at bedside to discuss recs for SNF    1126: CM met with pt at bedside to discuss dispo. Pt reports he discharged from [facility] 6/3/2024 and is willing to return if he has enough days. CM to send referral to [facility]. Pt reports if he is out of days his plan is to return home with wife and [facility].     1203: CM received message from [facility] that pt d/c'd from them 6/2 and is maxed out on Medicare days. Pt's Medicare days will reset 8/4/2024.     1303: CM received message from [facility] that pt's days will not reset as he came into the hospital within his 60 days to reset. Pt will return home with [facility] when cleared for dc.

## 2024-07-29 NOTE — PROGRESS NOTES
Nephrology follow-up        Patient: Shantanu Casillas MRN: [id number]  SSN: [id number]    YOB: 1955  Age: 68 y.o.  Sex: male      Subjective:   The patient is seen at the bedside.  Blood pressures are better.  On nasal cannula 3 L  Afebrile  No leukocytosis  Resolving DEMARCO    Past Medical History:   Diagnosis Date    Arthritis     CAD (coronary artery disease)     Chronic obstructive pulmonary disease (HCC)     Diabetes (HCC)     Gout     Hypertension     Ill-defined condition     Sleep apnea     cpap     Past Surgical History:   Procedure Laterality Date    FOOT DEBRIDEMENT Right 12/15/2023    INCISION AND DRAINAGE RIGHT FIRST METATARSAL PHALANGEAL JOINT performed by Polo Oviedo DPM at [facility] MAIN OR    FOOT SURGERY      right heel - foriegn object    HX VEIN ABLATION ADHESIVE      ORTHOPEDIC SURGERY      back surgery    PACEMAKER      aicd -      Family History   Problem Relation Age of Onset    Diabetes Mother     Heart Disease Father     Hypertension Father     Diabetes Sister     Diabetes Brother     Hypertension Mother     Heart Disease Mother     Kidney Disease Mother      Social History     Tobacco Use    Smoking status: Never    Smokeless tobacco: Never   Substance Use Topics    Alcohol use: Not Currently      Current Facility-Administered Medications   Medication Dose Route Frequency Provider Last Rate Last Admin    ceFEPIme (MAXIPIME) 2,000 mg in sodium chloride 0.9 % 100 mL IVPB (mini-bag)  2,000 mg IntraVENous Q24H Ed Pino MD        gabapentin (NEURONTIN) capsule 300 mg  300 mg Oral TID Ed Pino MD        vancomycin (VANCOCIN) 2,500 mg in sodium chloride 0.9 % 500 mL IVPB  2,500 mg IntraVENous Once Ed Pino  mL/hr at 07/29/24 0935 2,500 mg at 07/29/24 0935    Vancomycin - Pharmacy to dose  1 each Other RX Placeholder Ed Pino MD        acetaminophen (TYLENOL) tablet 650 mg  650 mg Oral Q4H PRN Ed Pino MD   650 mg at 07/29/24  0552    acetaminophen (TYLENOL) suppository 650 mg  650 mg Rectal Q4H PRN Ed Pino MD        insulin glargine (LANTUS) injection vial 90 Units  90 Units SubCUTAneous QAM Ed Pino MD   90 Units at 07/29/24 0901    insulin glargine (LANTUS) injection vial 40 Units  40 Units SubCUTAneous Nightly Ed Pino MD   40 Units at 07/28/24 2011    aluminum & magnesium hydroxide-simethicone (MAALOX) 200-200-20 MG/5ML suspension 30 mL  30 mL Oral Q6H PRN Orion Hameed MD   30 mL at 07/28/24 1903    0.9 % sodium chloride infusion   IntraVENous Continuous Bhupendra Hayden MD 75 mL/hr at 07/28/24 1020 New Bag at 07/28/24 1020    ipratropium 0.5 mg-albuterol 2.5 mg (DUONEB) nebulizer solution 1 Dose  1 Dose Inhalation Q4H PRN Ed Pino MD        potassium chloride (KLOR-CON) extended release tablet 40 mEq  40 mEq Oral Once Bhupendra Hayden MD        potassium chloride (KLOR-CON) extended release tablet 40 mEq  40 mEq Oral Once Ed Pino MD        albuterol sulfate HFA (PROVENTIL;VENTOLIN;PROAIR) 108 (90 Base) MCG/ACT inhaler 1 puff  1 puff Inhalation Q4H PRN Ed Pino MD        allopurinol (ZYLOPRIM) tablet 200 mg  200 mg Oral Daily Ed Pino MD   200 mg at 07/29/24 0901    ascorbic acid (VITAMIN C) tablet 1,000 mg  1,000 mg Oral BID Ed Pino MD   1,000 mg at 07/29/24 0902    aspirin chewable tablet 81 mg  81 mg Oral Daily Ed Pino MD   81 mg at 07/29/24 0902    atorvastatin (LIPITOR) tablet 40 mg  40 mg Oral Nightly Ed Pino MD   40 mg at 07/28/24 2010    b complex vitamins capsule 1 capsule  1 capsule Oral Daily Ed Pino MD   1 capsule at 07/29/24 0901    [Held by provider] bumetanide (BUMEX) tablet 2 mg  2 mg Oral BID Ed Pino MD   2 mg at 07/26/24 0950    [Held by provider] colchicine (COLCRYS) tablet 0.6 mg  0.6 mg Oral Daily Ed Pino MD        ferrous sulfate (IRON 325) tablet 325 mg  325 mg Oral Daily with breakfast

## 2024-07-29 NOTE — PROGRESS NOTES
PULMONARY NOTE  [facility]    Name: Shantanu Casillas MRN: [id number]   : 1955 Hospital: [facility]   Date: 2024  Admission date: 2024 Hospital Day: 5       HPI:     Patient Active Problem List   Diagnosis    Onychomycosis    Chronic ulcer of left leg, with fat layer exposed (HCC)    Idiopathic chronic venous hypertension of left lower extremity with ulcer (HCC)    Ulcerated, foot, left, with fat layer exposed (HCC)    Cellulitis    COVID-19    Venous ulcer (HCC)    Hyperkeratosis    Localized edema    Type 2 diabetes mellitus with diabetic polyneuropathy, with long-term current use of insulin (HCC)    Cellulitis and abscess of right leg    Chronic ulcer of right heel limited to breakdown of skin (HCC)    Ulcer of right leg, with fat layer exposed (HCC)    Ankle ulcer, right, with fat layer exposed (HCC)    Non-pressure chronic ulcer of other part of right lower leg with fat layer exposed (HCC)    Back pain    Acute on chronic systolic CHF (congestive heart failure), NYHA class 2 (HCC)    Shortness of breath    Acute on chronic congestive heart failure (HCC)    Wound infection    Venous stasis ulcer (HCC)    Open wound of right foot    Atherosclerotic heart disease of native coronary artery without angina pectoris    Chronic obstructive pulmonary disease, unspecified (HCC)    Chronic pain disorder    Dependence on supplemental oxygen    Difficulty in walking, not elsewhere classified    Gout, unspecified    Hypertensive heart disease with heart failure (HCC)    Morbid (severe) obesity due to excess calories (HCC)    Muscle weakness (generalized)    Obstructive sleep apnea (adult) (pediatric)    Other lack of coordination    Other symptoms and signs involving the musculoskeletal system    Personal history of COVID-19    Presence of cardiac pacemaker    Type 2 diabetes mellitus with diabetic peripheral angiopathy without gangrene (HCC)    Unspecified cirrhosis of liver (HCC)

## 2024-07-29 NOTE — PROGRESS NOTES
General Daily Progress Note      Patient Name:   Shantanu Casillas       YOB: 1955       Age:  68 y.o.      Admit Date: 7/25/2024      Subjective:     Patient is a 68 y.o. year old male morbidly obese history of COPD type 2 diabetes hypertension obstructive sleep apnea CHF ejection fraction 40 to 45% venous stasis ulcer came to emergency room because of generalized weakness had a fall no loss of consciousness or hitting head came to emergency room also complaining of generalized weakness last few days has had some back pain seen by the ER physician and recommended patient to be admitted for possible cellulitis of the both leg     Patient denies any chest pain but has shortness of breath on little exertion patient denies no fever no chills    7/27    Patient was obtunded last night blood sugars running high ordered CT scan of the head was negative CT scan of the abdomen shows left-sided nephrolithiasis no obstruction  Wound culture came back positive to gram-negative rods    Sodium 135 potassium 3.3 CO2 33 BUN 83 creatinine 2.79 blood sugars running 308 and 360    Take Lantus 90 units in the morning and 40 in the evening       Seen by podiatrist recommend to continue wound care continue IV antibiotics    7/28    Patient resting in bed alert awake denies any chest pain or shortness of breath or nausea or vomiting feeling much better    Seen by the neurology, recommend to monitor regarding twitching of the hands    Seen by the pulmonologist    Urine culture came back positive for gram-negative rods  And wound culture also positive for gram-negative dawson    Today's labs are pending    7/29    Patient sitting on the side of the bed alert awake feeling much better today patient want to see a cardiology  Wound culture urine culture came back positive for Enterobacter      Objective:     Vitals:    07/29/24 0745   BP: 124/84   Pulse: 98   Resp: 20   Temp: 97.9 °F (36.6 °C)   SpO2: 97%        Recent Results

## 2024-07-30 VITALS
BODY MASS INDEX: 44.1 KG/M2 | OXYGEN SATURATION: 98 % | SYSTOLIC BLOOD PRESSURE: 125 MMHG | WEIGHT: 315 LBS | HEART RATE: 102 BPM | TEMPERATURE: 97.7 F | HEIGHT: 71 IN | DIASTOLIC BLOOD PRESSURE: 72 MMHG | RESPIRATION RATE: 18 BRPM

## 2024-07-30 LAB
ALBUMIN SERPL-MCNC: 2.3 G/DL (ref 3.5–5)
ANION GAP SERPL CALC-SCNC: 6 MMOL/L (ref 5–15)
BUN SERPL-MCNC: 38 MG/DL (ref 6–20)
BUN/CREAT SERPL: 23 (ref 12–20)
CA-I BLD-MCNC: 9.2 MG/DL (ref 8.5–10.1)
CHLORIDE SERPL-SCNC: 105 MMOL/L (ref 97–108)
CO2 SERPL-SCNC: 27 MMOL/L (ref 21–32)
CREAT SERPL-MCNC: 1.62 MG/DL (ref 0.7–1.3)
DATE LAST DOSE: NORMAL
DOSE AMOUNT: NORMAL UNITS
GLUCOSE BLD STRIP.AUTO-MCNC: 106 MG/DL (ref 65–100)
GLUCOSE BLD STRIP.AUTO-MCNC: 159 MG/DL (ref 65–100)
GLUCOSE SERPL-MCNC: 99 MG/DL (ref 65–100)
PERFORMED BY:: ABNORMAL
PERFORMED BY:: ABNORMAL
PHOSPHATE SERPL-MCNC: 2.2 MG/DL (ref 2.6–4.7)
POTASSIUM SERPL-SCNC: 3.9 MMOL/L (ref 3.5–5.1)
SODIUM SERPL-SCNC: 138 MMOL/L (ref 136–145)
VANCOMYCIN SERPL-MCNC: 12.2 UG/ML

## 2024-07-30 PROCEDURE — 80202 ASSAY OF VANCOMYCIN: CPT

## 2024-07-30 PROCEDURE — 2580000003 HC RX 258: Performed by: FAMILY MEDICINE

## 2024-07-30 PROCEDURE — 80069 RENAL FUNCTION PANEL: CPT

## 2024-07-30 PROCEDURE — 94640 AIRWAY INHALATION TREATMENT: CPT

## 2024-07-30 PROCEDURE — 97530 THERAPEUTIC ACTIVITIES: CPT

## 2024-07-30 PROCEDURE — 6360000002 HC RX W HCPCS: Performed by: FAMILY MEDICINE

## 2024-07-30 PROCEDURE — 82962 GLUCOSE BLOOD TEST: CPT

## 2024-07-30 PROCEDURE — 36415 COLL VENOUS BLD VENIPUNCTURE: CPT

## 2024-07-30 PROCEDURE — 6370000000 HC RX 637 (ALT 250 FOR IP): Performed by: FAMILY MEDICINE

## 2024-07-30 PROCEDURE — 2700000000 HC OXYGEN THERAPY PER DAY

## 2024-07-30 PROCEDURE — 99222 1ST HOSP IP/OBS MODERATE 55: CPT | Performed by: INTERNAL MEDICINE

## 2024-07-30 PROCEDURE — 94760 N-INVAS EAR/PLS OXIMETRY 1: CPT

## 2024-07-30 RX ORDER — INSULIN GLARGINE 100 [IU]/ML
40 INJECTION, SOLUTION SUBCUTANEOUS NIGHTLY
Qty: 10 ML | Refills: 3 | Status: SHIPPED | OUTPATIENT
Start: 2024-07-30

## 2024-07-30 RX ORDER — DOXYCYCLINE HYCLATE 100 MG
100 TABLET ORAL 2 TIMES DAILY
Qty: 28 TABLET | Refills: 0 | Status: SHIPPED | OUTPATIENT
Start: 2024-07-30 | End: 2024-08-13

## 2024-07-30 RX ORDER — INSULIN GLARGINE 100 [IU]/ML
90 INJECTION, SOLUTION SUBCUTANEOUS EVERY MORNING
Qty: 10 ML | Refills: 3 | Status: SHIPPED | OUTPATIENT
Start: 2024-07-31

## 2024-07-30 RX ORDER — IPRATROPIUM BROMIDE AND ALBUTEROL SULFATE 2.5; .5 MG/3ML; MG/3ML
3 SOLUTION RESPIRATORY (INHALATION) EVERY 4 HOURS PRN
Qty: 360 ML | Refills: 1 | Status: SHIPPED | OUTPATIENT
Start: 2024-07-30

## 2024-07-30 RX ADMIN — GABAPENTIN 300 MG: 300 CAPSULE ORAL at 09:10

## 2024-07-30 RX ADMIN — Medication 2 PUFF: at 07:46

## 2024-07-30 RX ADMIN — PANTOPRAZOLE SODIUM 40 MG: 40 TABLET, DELAYED RELEASE ORAL at 06:08

## 2024-07-30 RX ADMIN — Medication 400 UNITS: at 09:10

## 2024-07-30 RX ADMIN — ASPIRIN 81 MG 81 MG: 81 TABLET ORAL at 09:10

## 2024-07-30 RX ADMIN — FLUTICASONE PROPIONATE 2 SPRAY: 50 SPRAY, METERED NASAL at 09:10

## 2024-07-30 RX ADMIN — ALLOPURINOL 200 MG: 100 TABLET ORAL at 09:10

## 2024-07-30 RX ADMIN — POTASSIUM BICARBONATE 40 MEQ: 782 TABLET, EFFERVESCENT ORAL at 09:09

## 2024-07-30 RX ADMIN — INSULIN GLARGINE 90 UNITS: 100 INJECTION, SOLUTION SUBCUTANEOUS at 09:09

## 2024-07-30 RX ADMIN — SODIUM CHLORIDE, PRESERVATIVE FREE 10 ML: 5 INJECTION INTRAVENOUS at 09:10

## 2024-07-30 RX ADMIN — HEPARIN SODIUM 5000 UNITS: 5000 INJECTION INTRAVENOUS; SUBCUTANEOUS at 06:08

## 2024-07-30 RX ADMIN — FERROUS SULFATE TAB 325 MG (65 MG ELEMENTAL FE) 325 MG: 325 (65 FE) TAB at 09:10

## 2024-07-30 RX ADMIN — Medication 2 PUFF: at 07:47

## 2024-07-30 RX ADMIN — Medication 1 CAPSULE: at 09:09

## 2024-07-30 RX ADMIN — OXYCODONE HYDROCHLORIDE AND ACETAMINOPHEN 1000 MG: 500 TABLET ORAL at 09:09

## 2024-07-30 ASSESSMENT — PAIN SCALES - GENERAL: PAINLEVEL_OUTOF10: 0

## 2024-07-30 ASSESSMENT — ENCOUNTER SYMPTOMS
SHORTNESS OF BREATH: 1
GASTROINTESTINAL NEGATIVE: 1

## 2024-07-30 NOTE — CARE COORDINATION
DCP: from home with wife and [facility]; pt is out of skilled days, unable to go to SNF    1028: CM met with pt at bedside to discuss dispo. CM updated pt that he his out of SNF days as he was unsure yesterday. Pt states he is fine to go home and continue services with [facility]. CM asked about transportation needs for dc. Pt was given private pay quote for wheelchair transport to home. Pt reports he will have his wife transport him. PT/OT report that pt was able to ambulate to the door from this bed this morning and his baseline level of functioning. CM explained IMM to pt and placed copy on his chart and delivered one to his bedside.       Transition of Care Plan:    RUR: 19%  Prior Level of Functioning: requires some assistance  Disposition: home with wife and [facility]  If SNF or IPR: Date FOC offered: n/a  Date FOC received: n/a  Accepting facility: n/a  Date authorization started with reference number: n/a  Date authorization received and expires: n/a  Follow up appointments: unit secretary to setup as needed  DME needed: none  Transportation at discharge: wife to transport  IM/IMM Medicare/ letter given: yes  Is patient a  and connected with VA? no   If yes, was [facility] transfer form completed and [facility] notified? N/a  Caregiver Contact: n/a, pt contacted pt wife  Discharge Caregiver contacted prior to discharge? N/a pt contacted his wife  Care Conference needed? no  Barriers to discharge: none

## 2024-07-30 NOTE — PLAN OF CARE
PHYSICAL THERAPY TREATMENT     Patient: Shantanu Casillas (68 y.o. male)  Date: 7/30/2024  Diagnosis: Cellulitis [L03.90]  Cellulitis of lower extremity, unspecified laterality [L03.119]  Fatigue due to excessive exertion, initial encounter [T73.3XXA] Cellulitis      Precautions: Bed Alarm, Fall Risk                      Recommendations for nursing mobility: Encourage HEP in prep for ADLs/mobility; see handout for details, LE elevation for management of edema, Use of BSC for toileting , and Assist x2    In place during session: Nasal Cannula 3L, External Catheter, and EKG/telemetry   Chart, physical therapy assessment, plan of care and goals were reviewed.  ASSESSMENT  Patient continues with skilled PT services and is progressing towards goals. Pt sitting on EOB upon PT arrival, agreeable to session. Pt A&O x 4. (See below for objective details and assist levels).     Overall pt tolerated session well today with 8/10 pain reported in B legs and feet.  Pt requiring less assist with bed mobility and transfers today and tolerated sitting at EOB for 15 minutes today while performing self care tasks and LE exercises. Pt denies any dizziness or lightheadedness during treatment today but did experience SOB during activity; however O2 sats remained at 99%.  Pt progressed ambulation distance today to 20ft with rollator and CGA x 2. Pt demos forward flexed posture and forward head and tends to lean on the rollator requiring cues to stand erect.  Pt amb with wide OH and slow, shuffling steps, requiring cues to increase step length.  Pt did not experience any major LOB or knee buckling during gait, but was very fatigued at 20 ft and needed to sit.  Pt reports he only walks from his bedroom to the computer room at home and feels he will be able to do that when he goes home.  Pt would benefit from SNF; however, it appears his benefits are out and he will go home with HHPT.  Will continue to benefit from skilled PT

## 2024-07-30 NOTE — CONSULTS
Infectious Disease Consult Note    Reason for Consult: Chronic leg ulcers  Date of Consultation: July 30, 2024  Date of Admission: 7/25/2024  Referring Physician: Dr Purvis    HPI: 68 y.o WM admitted on 7/26 w c/o leg swelling, has underlying lymphedema, w recurrent/chronic leg ulcers. His medical history is significant for COPD on chronic home O2, DM, gouty arthritis, CHF w EF of 40-45%, morbid obesity, HTN, sleep apnea and arthritis.  He reported generalized weakness, experienced a fall prior to presentation.  He has been afebrile for the past couple days, mildly tachycardic, maintain O2 sats on 3 L. WBC has been WNLs, Cr was 7.73, trended down to 1.62 on todays labs. UA on 07/26 revealed WBC of 20-50.  E cloacae complex and MRSA were isolated from her right and left leg Cxs (07/26).  MRSA reported as sensitive to doxycycline, susceptibility to tetracycline not reported for E cloacae Complex. Pt states he would like to be discharged home today, does not like hospital bed    Review of Systems:     Gen: Negative for chills, fevers, weight loss, weight gain   CV:  Negative for chest pain, dyspnea on exertion, leg edema   Lungs: Negative for shortness of breath, cough, wheezing   Abdomen: Negative for abdominal pain, nausea, vomiting, diarrhea, constipation   Genitourinary: Negative for genital pain or genital discharge     Neuro: Negative for headache, numbness, tingling, extremity weakness   Skin: Negative for rash, sores/open wounds   Musculoskeletal: b/l leg swelling and ulcers    Psych: Negative for manic behavior       Past Medical History:  Past Medical History:   Diagnosis Date    Arthritis     CAD (coronary artery disease)     Chronic obstructive pulmonary disease (HCC)     Diabetes (HCC)     Gout     Hypertension     Ill-defined condition     Sleep apnea     cpap         Past surgical history     Past Surgical History:   Procedure Laterality Date    FOOT DEBRIDEMENT Right 12/15/2023    INCISION AND  ug/mL Sensitive      doxycycline 1 ug/mL Sensitive      erythromycin >=8 ug/mL Resistant      gentamicin <=0.5 ug/mL Sensitive      Inducible Clindamycin Negative ug/mL  [1]        levofloxacin >=8 ug/mL Resistant      linezolid 2 ug/mL Sensitive      oxacillin >=4 ug/mL Resistant      rifampin <=0.5 ug/mL Sensitive  [2]       tetracycline 2 ug/mL Sensitive      trimethoprim-sulfamethoxazole <=10 ug/mL Sensitive      vancomycin 1 ug/mL Sensitive                   [1]  HIDE     [2]  Rifampin is not to be used for mono-therapy.                   Culture, Wound [[id number]]  (Abnormal)  (Susceptibility) Collected: 07/26/24 0139    Order Status: Completed Specimen: Leg, left Updated: 07/28/24 1004     Special Requests No Special Requests        Gram Stain Occasional WBCs seen               Occasional Gram Negative Rods           Culture       Heavy Enterobacter cloacae complex                  Heavy Methicillin Resistant Staphylococcus aureus                  Light Mixed skin ady isolated          Susceptibility        Enterobacter cloacae complex      BACTERIAL SUSCEPTIBILITY PANEL POWER      amikacin <=2 ug/mL Sensitive      ceFAZolin >=64 ug/mL Resistant      cefepime <=1 ug/mL Sensitive      cefTAZidime >=64 ug/mL Resistant      cefTRIAXone >=64 ug/mL Resistant      ciprofloxacin >=4 ug/mL Resistant      gentamicin <=1 ug/mL Sensitive      levofloxacin 4 ug/mL Intermediate      tobramycin <=1 ug/mL Sensitive      trimethoprim-sulfamethoxazole <=20 ug/mL Sensitive                       Susceptibility        Methicillin-Resistant Staphylococcus aureus      BACTERIAL SUSCEPTIBILITY PANEL POWER      ciprofloxacin >=8 ug/mL Resistant      clindamycin >=4 ug/mL Resistant      DAPTOmycin 0.5 ug/mL Sensitive      doxycycline 1 ug/mL Sensitive      erythromycin >=8 ug/mL Resistant      gentamicin <=0.5 ug/mL Sensitive      Inducible Clindamycin Negative ug/mL  [1]        levofloxacin >=8 ug/mL Resistant      linezolid 4 ug/mL

## 2024-07-30 NOTE — PROGRESS NOTES
Alternate 110 mg of ibuprofen and 160 mg of Tylenol every 4 hours. Single spray of Flonase to each nostril. 10 mg of Benadryl before bed.                   Culture, Wound [[id number]]  (Abnormal)  (Susceptibility) Collected: 07/26/24 0139    Order Status: Completed Specimen: Leg, left Updated: 07/28/24 1004     Special Requests No Special Requests        Gram Stain Occasional WBCs seen               Occasional Gram Negative Rods           Culture       Heavy Enterobacter cloacae complex                  Heavy Methicillin Resistant Staphylococcus aureus                  Light Mixed skin ady isolated          Susceptibility        Enterobacter cloacae complex      BACTERIAL SUSCEPTIBILITY PANEL POWER      amikacin <=2 ug/mL Sensitive      ceFAZolin >=64 ug/mL Resistant      cefepime <=1 ug/mL Sensitive      cefTAZidime >=64 ug/mL Resistant      cefTRIAXone >=64 ug/mL Resistant      ciprofloxacin >=4 ug/mL Resistant      gentamicin <=1 ug/mL Sensitive      levofloxacin 4 ug/mL Intermediate      tobramycin <=1 ug/mL Sensitive      trimethoprim-sulfamethoxazole <=20 ug/mL Sensitive                       Susceptibility        Methicillin-Resistant Staphylococcus aureus      BACTERIAL SUSCEPTIBILITY PANEL POWER      ciprofloxacin >=8 ug/mL Resistant      clindamycin >=4 ug/mL Resistant      DAPTOmycin 0.5 ug/mL Sensitive      doxycycline 1 ug/mL Sensitive      erythromycin >=8 ug/mL Resistant      gentamicin <=0.5 ug/mL Sensitive      Inducible Clindamycin Negative ug/mL  [1]        levofloxacin >=8 ug/mL Resistant      linezolid 4 ug/mL Sensitive      oxacillin >=4 ug/mL Resistant      rifampin <=0.5 ug/mL Sensitive  [2]       tetracycline 2 ug/mL Sensitive      trimethoprim-sulfamethoxazole <=10 ug/mL Sensitive      vancomycin 1 ug/mL Sensitive                   [1]  HIDE     [2]  Rifampin is not to be used for mono-therapy.                            Imaging:  CT CERVICAL SPINE WO CONTRAST    Result Date: 7/25/2024  EXAM:  CT CERVICAL SPINE WITHOUT CONTRAST INDICATION: acute process in spinous processes. Reason for exam:->acute process in spinous processes  chronic hypercapnic respiratory failure on noninvasive ventilator at home trilogy with settings rate 18  PEEP 5 2 L bleed in  Hypokalemia to be repleted  Hyponatremia improved      RECOMMENDATIONS/PLAN:     68-year-old male admitted because of swelling of the lower extremities he is chronically on home oxygen 2 L  nasal Cannula oxygen as salvage oxygen delivery device to provide high concentration of oxygen to overcome refractory hypoxia;  He is on trilogy noninvasive ventilator at home rate 18 PEEP 5  FiO2 30% he used his home machine last night  Chest x-ray shows small left pleural effusion left basilar atelectasis unchanged on x-ray 7/28  Bumex on hold with good urine output yesterday 2 L BNP has risen slightly  Continue with aggressive wound care     7/27 complains of abdominal bloating indigestion and constipation.  Will give dose of milk of magnesia today but if unsuccessful then give sorbitol tomorrow.  Will add Mylanta for as needed use.  Bumex is on hold due to worsening creatinine.  Will repeat chest x-ray in a.m. if worsening fluid retention will resume diuretic  7/28 states he is stooling after receiving milk of magnesia still has indigestion and I reminded him that Mylanta was available for him to request.  Phosphorus is low will replete.  Spontaneous diuresis 2 L yesterday without Bumex and creatinine is improved  7/29 Small left effusion noted on Xray. BUN and Creatinine going down he is compliant with his noninvasive ventilator and nasal cannula during the daytime  7/30 alert awake getting wound care this morning using his trilogy at night on nasal cannula      Yeison Fraire MD

## 2024-07-30 NOTE — PROGRESS NOTES
Discharge plan of care/case management plan validated with provider discharge order. Discharge order noted and discharge summary in. Patient ins table condition for discharge at this time.     Discharge instructions and medications reviewed with patient. Patient verbalized understanding and expressed no concerns.     Midline and telemetry box removed. Patient taken to front entrance in wheel chair with all belongings to include Rolator, trilogy, and cell phone, and transported home by wife.

## 2024-07-30 NOTE — PLAN OF CARE
Problem: Pain  Goal: Verbalizes/displays adequate comfort level or baseline comfort level  7/29/2024 2125 by Darlene Hameed RN  Outcome: Progressing  7/29/2024 1805 by Elsie Khan RN  Outcome: Progressing     Problem: Skin/Tissue Integrity  Goal: Absence of new skin breakdown  Description: 1.  Monitor for areas of redness and/or skin breakdown  2.  Assess vascular access sites hourly  3.  Every 4-6 hours minimum:  Change oxygen saturation probe site  4.  Every 4-6 hours:  If on nasal continuous positive airway pressure, respiratory therapy assess nares and determine need for appliance change or resting period.  7/29/2024 2125 by Darlene Hameed RN  Outcome: Progressing  7/29/2024 1805 by Elsie Khan RN  Outcome: Progressing     Problem: Chronic Conditions and Co-morbidities  Goal: Patient's chronic conditions and co-morbidity symptoms are monitored and maintained or improved  7/29/2024 2125 by Darlene Hameed RN  Outcome: Progressing  7/29/2024 1805 by Elsie Khan RN  Outcome: Progressing

## 2024-07-30 NOTE — PROGRESS NOTES
OCCUPATIONAL THERAPY TREATMENT  Patient: Shantanu Casillas (68 y.o. male)  Date: 7/30/2024  Primary Diagnosis: Cellulitis [L03.90]  Cellulitis of lower extremity, unspecified laterality [L03.119]  Fatigue due to excessive exertion, initial encounter [T73.3XXA]       Precautions: Bed Alarm, Fall Risk                Recommendations for nursing mobility: Out of bed to chair for meals, Encourage HEP in prep for ADLs/mobility; see handout for details, Use of BSC for toileting , AD and gt belt for bed to chair , and Assist x2    In place during session: Nasal Cannula 3L, External Catheter, and EKG/telemetry   Chart, occupational therapy assessment, plan of care, and goals were reviewed.  ASSESSMENT  Patient continues with skilled OT services and is progressing towards goals. Pt semi supine in bed upon PT/ EDEN arrival, agreeable to session. Pt A&O x 4. Pt demonstrated improved bed mobility, STS and functional mobility around the room. See below for details on mobility.  Pt completed light grooming seated eob. Pt completed 1 sts w/ min A x 2 to Rolator and was able to increase functional mobility in preparation for household ambulation and adl tasks.  Pt requested to return to semi supine. During sit to supine, pt found w/ bm on posterior which required TA for cleaning. Pt completed UE therex semi supine in bed to maintain/ increase strength and endurance to aid in adl performance. Hand out given and reviewed on HEP w/ pt verbalizing good understanding.  All known needs met.(See below for objective details and assist levels).     Overall pt tolerated session fair today with rest breaks. Pt continues to be limited by activity tolerance.  Potential barriers for safe discharge: pt is a high fall risk, pt is not safe to be alone, and concern for pt safely navigating or managing the home environment. Current OT recommendations for discharge Skilled Nursing Facility. Will continue to benefit from skilled OT services, and will  []    Hand flex/ ext 1 12 [x] [] [] []      Pain Ratin/10 B feet/ legs  Pain Intervention(s):   patient medicated for pain prior to session    Activity Tolerance:   Fair  and requires frequent rest breaks    After treatment patient left in no apparent distress:   Bed locked and returned to lowest position, Patient left in no apparent distress in bed, Call bell within reach, Bed/ chair alarm activated, and Side rails x3, and nsg updated     COMMUNICATION/EDUCATION:   The patient’s plan of care was discussed with: Physical therapist and Registered nurse  PT/OT sessions occurred together for increased safety of pt and clinician.    Patient Education  Education Given To: Patient  Education Provided: Role of Therapy;Fall Prevention Strategies;Plan of Care;Transfer Training;Mobility Training;ADL Adaptive Strategies  Education Method: Verbal  Education Outcome: Verbalized understanding;Demonstrated understanding;Continued education needed    Thank you for this referral.  ODALIS White  Minutes: 44

## 2024-07-30 NOTE — DISCHARGE SUMMARY
Discharge Summary       PATIENT ID: Shantanu Casillas  MRN: [id number]   YOB: 1955    DATE OF ADMISSION: 7/25/2024   DATE OF DISCHARGE:   PRIMARY CARE PROVIDER: [unfilled]      ATTENDING PHYSICIAN: Ed Pino  DISCHARGING PROVIDER: Ed Pino      CONSULTATIONS: IP CONSULT TO NEPHROLOGY  IP WOUND CARE NURSE CONSULT TO EVAL  IP CONSULT TO PODIATRY  IP CONSULT TO PULMONOLOGY  IP CONSULT TO CASE MANAGEMENT  IP CONSULT TO TELE-NEUROLOGY  IP CONSULT TO UROLOGY  IP CONSULT TO VASCULAR ACCESS TEAM  IP CONSULT TO CARDIOLOGY  IP CONSULT TO PHARMACY  IP CONSULT TO INFECTIOUS DISEASES    PROCEDURES/SURGERIES: * No surgery found *    ADMITTING DIAGNOSES:    Patient Active Problem List    Diagnosis Date Noted    Ulcer of right foot with fat layer exposed (HCC) 07/19/2024    Generalized edema 02/19/2024    Acute pneumonia 02/13/2024    CAP (community acquired pneumonia) due to Chlamydia species 02/13/2024    Open wound of right foot 10/11/2023    Atherosclerotic heart disease of native coronary artery without angina pectoris 08/28/2023    Chronic obstructive pulmonary disease, unspecified (HCC) 08/28/2023    Chronic pain disorder 08/28/2023    Dependence on supplemental oxygen 08/28/2023    Difficulty in walking, not elsewhere classified 08/28/2023    Gout, unspecified 08/28/2023    Hypertensive heart disease with heart failure (HCC) 08/28/2023    Morbid (severe) obesity due to excess calories (HCC) 08/28/2023    Muscle weakness (generalized) 08/28/2023    Obstructive sleep apnea (adult) (pediatric) 08/28/2023    Other lack of coordination 08/28/2023    Other symptoms and signs involving the musculoskeletal system 08/28/2023    Personal history of COVID-19 08/28/2023    Presence of cardiac pacemaker 08/28/2023    Type 2 diabetes mellitus with diabetic peripheral angiopathy without gangrene (HCC) 08/28/2023    Unspecified cirrhosis of liver (HCC) 08/28/2023    Unspecified osteoarthritis, unspecified site

## 2024-07-31 LAB
BACTERIA SPEC CULT: ABNORMAL
GRAM STN SPEC: ABNORMAL
Lab: ABNORMAL

## 2024-08-01 LAB
BACTERIA SPEC CULT: NORMAL
BACTERIA SPEC CULT: NORMAL
Lab: NORMAL
Lab: NORMAL

## 2024-08-06 ENCOUNTER — HOSPITAL ENCOUNTER (OUTPATIENT)
Facility: HOSPITAL | Age: 69
Setting detail: SPECIMEN
Discharge: HOME | End: 2024-08-09

## 2024-08-06 LAB
ANION GAP SERPL CALC-SCNC: 10 MMOL/L (ref 5–15)
BASOPHILS # BLD: 0 K/UL (ref 0–0.1)
BASOPHILS NFR BLD: 1 % (ref 0–1)
BUN SERPL-MCNC: 33 MG/DL (ref 6–20)
BUN/CREAT SERPL: 18 (ref 12–20)
CA-I BLD-MCNC: 8.8 MG/DL (ref 8.5–10.1)
CHLORIDE SERPL-SCNC: 101 MMOL/L (ref 97–108)
CO2 SERPL-SCNC: 27 MMOL/L (ref 21–32)
CREAT SERPL-MCNC: 1.83 MG/DL (ref 0.7–1.3)
DIFFERENTIAL METHOD BLD: ABNORMAL
EOSINOPHIL # BLD: 0.4 K/UL (ref 0–0.4)
EOSINOPHIL NFR BLD: 5 % (ref 0–7)
ERYTHROCYTE [DISTWIDTH] IN BLOOD BY AUTOMATED COUNT: 18.2 % (ref 11.5–14.5)
GLUCOSE SERPL-MCNC: 226 MG/DL (ref 65–100)
HCT VFR BLD AUTO: 25.5 % (ref 36.6–50.3)
HGB BLD-MCNC: 8 G/DL (ref 12.1–17)
IMM GRANULOCYTES # BLD AUTO: 0.1 K/UL (ref 0–0.04)
IMM GRANULOCYTES NFR BLD AUTO: 1 % (ref 0–0.5)
LYMPHOCYTES # BLD: 1.8 K/UL (ref 0.8–3.5)
LYMPHOCYTES NFR BLD: 26 % (ref 12–49)
MCH RBC QN AUTO: 29.5 PG (ref 26–34)
MCHC RBC AUTO-ENTMCNC: 31.4 G/DL (ref 30–36.5)
MCV RBC AUTO: 94.1 FL (ref 80–99)
MONOCYTES # BLD: 0.9 K/UL (ref 0–1)
MONOCYTES NFR BLD: 12 % (ref 5–13)
NEUTS SEG # BLD: 3.9 K/UL (ref 1.8–8)
NEUTS SEG NFR BLD: 55 % (ref 32–75)
NRBC # BLD: 0 K/UL (ref 0–0.01)
NRBC BLD-RTO: 0 PER 100 WBC
PLATELET # BLD AUTO: 424 K/UL (ref 150–400)
PMV BLD AUTO: 10.6 FL (ref 8.9–12.9)
POTASSIUM SERPL-SCNC: 4.2 MMOL/L (ref 3.5–5.1)
RBC # BLD AUTO: 2.71 M/UL (ref 4.1–5.7)
SODIUM SERPL-SCNC: 138 MMOL/L (ref 136–145)
WBC # BLD AUTO: 7 K/UL (ref 4.1–11.1)

## 2024-08-06 PROCEDURE — 85025 COMPLETE CBC W/AUTO DIFF WBC: CPT

## 2024-08-06 PROCEDURE — 80048 BASIC METABOLIC PNL TOTAL CA: CPT

## 2024-08-12 ENCOUNTER — HOSPITAL ENCOUNTER (OUTPATIENT)
Facility: HOSPITAL | Age: 69
Setting detail: SPECIMEN
Discharge: HOME | End: 2024-08-15

## 2024-08-12 PROCEDURE — 87070 CULTURE OTHR SPECIMN AEROBIC: CPT

## 2024-08-12 PROCEDURE — 87880 STREP A ASSAY W/OPTIC: CPT

## 2024-08-13 ENCOUNTER — HOSPITAL ENCOUNTER (OUTPATIENT)
Facility: HOSPITAL | Age: 69
Setting detail: SPECIMEN
Discharge: HOME | End: 2024-08-16

## 2024-08-13 LAB
AMMONIA PLAS-SCNC: 43 UMOL/L
DEPRECATED S PYO AG THROAT QL EIA: NEGATIVE

## 2024-08-13 PROCEDURE — 36415 COLL VENOUS BLD VENIPUNCTURE: CPT

## 2024-08-13 PROCEDURE — 82140 ASSAY OF AMMONIA: CPT

## 2024-08-14 ENCOUNTER — HOSPITAL ENCOUNTER (OUTPATIENT)
Facility: HOSPITAL | Age: 69
Setting detail: SPECIMEN
Discharge: HOME | End: 2024-08-17

## 2024-08-14 LAB
ARTERIAL PATENCY WRIST A: YES
BACTERIA SPEC CULT: NORMAL
BASE DEFICIT BLDA-SCNC: 1.7 MMOL/L
BDY SITE: ABNORMAL
BODY TEMPERATURE: 98.4
COHGB MFR BLD: 0.5 % (ref 1–2)
FIO2 ON VENT: 28 %
GAS FLOW.O2 O2 DELIVERY SYS: 2 L/MIN
HCO3 BLDA-SCNC: 25 MMOL/L (ref 22–26)
Lab: NORMAL
METHGB MFR BLD: 0.4 % (ref 0–1.4)
OXYHGB MFR BLD: 94.6 % (ref 95–99)
PCO2 BLDA: 49 MMHG (ref 35–45)
PERFORMED BY:: ABNORMAL
PH BLDA: 7.32 (ref 7.35–7.45)
PO2 BLDA: 86 MMHG (ref 80–100)
POTASSIUM SERPL-SCNC: 5.8 MMOL/L (ref 3.5–5.1)
SAO2 % BLD: 96 % (ref 95–99)
SAO2% DEVICE SAO2% SENSOR NAME: ABNORMAL
SPECIMEN SITE: ABNORMAL

## 2024-08-14 PROCEDURE — 36600 WITHDRAWAL OF ARTERIAL BLOOD: CPT

## 2024-08-14 PROCEDURE — 84132 ASSAY OF SERUM POTASSIUM: CPT

## 2024-08-14 PROCEDURE — 82803 BLOOD GASES ANY COMBINATION: CPT

## 2024-08-15 ENCOUNTER — APPOINTMENT (OUTPATIENT)
Facility: HOSPITAL | Age: 69
End: 2024-08-15
Payer: MEDICARE

## 2024-08-15 ENCOUNTER — HOSPITAL ENCOUNTER (INPATIENT)
Facility: HOSPITAL | Age: 69
LOS: 9 days | Discharge: NURSING, REHAB OR LONG-TERM CARE | End: 2024-08-24
Attending: STUDENT IN AN ORGANIZED HEALTH CARE EDUCATION/TRAINING PROGRAM | Admitting: FAMILY MEDICINE
Payer: MEDICARE

## 2024-08-15 DIAGNOSIS — E87.70 HYPERVOLEMIA, UNSPECIFIED HYPERVOLEMIA TYPE: Primary | ICD-10-CM

## 2024-08-15 DIAGNOSIS — I50.21 ACUTE SYSTOLIC CONGESTIVE HEART FAILURE (HCC): ICD-10-CM

## 2024-08-15 DIAGNOSIS — U07.1 COVID-19: ICD-10-CM

## 2024-08-15 DIAGNOSIS — N17.9 AKI (ACUTE KIDNEY INJURY) (HCC): ICD-10-CM

## 2024-08-15 LAB
ALBUMIN SERPL-MCNC: 2.5 G/DL (ref 3.5–5)
ALBUMIN/GLOB SERPL: 0.6 (ref 1.1–2.2)
ALP SERPL-CCNC: 116 U/L (ref 45–117)
ALT SERPL-CCNC: 25 U/L (ref 12–78)
ANION GAP SERPL CALC-SCNC: 7 MMOL/L (ref 5–15)
AST SERPL W P-5'-P-CCNC: 35 U/L (ref 15–37)
BASOPHILS # BLD: 0 K/UL (ref 0–0.1)
BASOPHILS NFR BLD: 0 % (ref 0–1)
BILIRUB SERPL-MCNC: 0.4 MG/DL (ref 0.2–1)
BNP SERPL-MCNC: 3528 PG/ML
BUN SERPL-MCNC: 67 MG/DL (ref 6–20)
BUN/CREAT SERPL: 18 (ref 12–20)
CA-I BLD-MCNC: 8.6 MG/DL (ref 8.5–10.1)
CHLORIDE SERPL-SCNC: 104 MMOL/L (ref 97–108)
CO2 SERPL-SCNC: 27 MMOL/L (ref 21–32)
CREAT SERPL-MCNC: 3.66 MG/DL (ref 0.7–1.3)
DIFFERENTIAL METHOD BLD: ABNORMAL
EOSINOPHIL # BLD: 0.3 K/UL (ref 0–0.4)
EOSINOPHIL NFR BLD: 4 % (ref 0–7)
ERYTHROCYTE [DISTWIDTH] IN BLOOD BY AUTOMATED COUNT: 20.1 % (ref 11.5–14.5)
GLOBULIN SER CALC-MCNC: 4.2 G/DL (ref 2–4)
GLUCOSE BLD STRIP.AUTO-MCNC: 249 MG/DL (ref 65–100)
GLUCOSE SERPL-MCNC: 133 MG/DL (ref 65–100)
HCT VFR BLD AUTO: 27.2 % (ref 36.6–50.3)
HGB BLD-MCNC: 8.5 G/DL (ref 12.1–17)
IMM GRANULOCYTES # BLD AUTO: 0.1 K/UL (ref 0–0.04)
IMM GRANULOCYTES NFR BLD AUTO: 1 % (ref 0–0.5)
LYMPHOCYTES # BLD: 1.2 K/UL (ref 0.8–3.5)
LYMPHOCYTES NFR BLD: 17 % (ref 12–49)
MCH RBC QN AUTO: 29.9 PG (ref 26–34)
MCHC RBC AUTO-ENTMCNC: 31.3 G/DL (ref 30–36.5)
MCV RBC AUTO: 95.8 FL (ref 80–99)
MONOCYTES # BLD: 0.8 K/UL (ref 0–1)
MONOCYTES NFR BLD: 11 % (ref 5–13)
NEUTS SEG # BLD: 4.4 K/UL (ref 1.8–8)
NEUTS SEG NFR BLD: 67 % (ref 32–75)
NRBC # BLD: 0.03 K/UL (ref 0–0.01)
NRBC BLD-RTO: 0.4 PER 100 WBC
PERFORMED BY:: ABNORMAL
PLATELET # BLD AUTO: 252 K/UL (ref 150–400)
PMV BLD AUTO: 10.1 FL (ref 8.9–12.9)
POTASSIUM SERPL-SCNC: 5.1 MMOL/L (ref 3.5–5.1)
PROT SERPL-MCNC: 6.7 G/DL (ref 6.4–8.2)
RBC # BLD AUTO: 2.84 M/UL (ref 4.1–5.7)
SARS-COV-2 RNA RESP QL NAA+PROBE: DETECTED
SODIUM SERPL-SCNC: 138 MMOL/L (ref 136–145)
SPECIMEN SOURCE: ABNORMAL
TROPONIN I SERPL HS-MCNC: 26 NG/L (ref 0–76)
WBC # BLD AUTO: 6.7 K/UL (ref 4.1–11.1)

## 2024-08-15 PROCEDURE — 85025 COMPLETE CBC W/AUTO DIFF WBC: CPT

## 2024-08-15 PROCEDURE — 94761 N-INVAS EAR/PLS OXIMETRY MLT: CPT

## 2024-08-15 PROCEDURE — 82570 ASSAY OF URINE CREATININE: CPT

## 2024-08-15 PROCEDURE — 80053 COMPREHEN METABOLIC PANEL: CPT

## 2024-08-15 PROCEDURE — 82962 GLUCOSE BLOOD TEST: CPT

## 2024-08-15 PROCEDURE — 99223 1ST HOSP IP/OBS HIGH 75: CPT | Performed by: INTERNAL MEDICINE

## 2024-08-15 PROCEDURE — 84484 ASSAY OF TROPONIN QUANT: CPT

## 2024-08-15 PROCEDURE — 83880 ASSAY OF NATRIURETIC PEPTIDE: CPT

## 2024-08-15 PROCEDURE — 94010 BREATHING CAPACITY TEST: CPT

## 2024-08-15 PROCEDURE — 96374 THER/PROPH/DIAG INJ IV PUSH: CPT

## 2024-08-15 PROCEDURE — 6370000000 HC RX 637 (ALT 250 FOR IP): Performed by: NURSE PRACTITIONER

## 2024-08-15 PROCEDURE — 84156 ASSAY OF PROTEIN URINE: CPT

## 2024-08-15 PROCEDURE — 87635 SARS-COV-2 COVID-19 AMP PRB: CPT

## 2024-08-15 PROCEDURE — 71045 X-RAY EXAM CHEST 1 VIEW: CPT

## 2024-08-15 PROCEDURE — 2700000000 HC OXYGEN THERAPY PER DAY

## 2024-08-15 PROCEDURE — 94640 AIRWAY INHALATION TREATMENT: CPT

## 2024-08-15 PROCEDURE — 36415 COLL VENOUS BLD VENIPUNCTURE: CPT

## 2024-08-15 PROCEDURE — 6370000000 HC RX 637 (ALT 250 FOR IP): Performed by: STUDENT IN AN ORGANIZED HEALTH CARE EDUCATION/TRAINING PROGRAM

## 2024-08-15 PROCEDURE — 6360000002 HC RX W HCPCS: Performed by: INTERNAL MEDICINE

## 2024-08-15 PROCEDURE — 2580000003 HC RX 258: Performed by: NURSE PRACTITIONER

## 2024-08-15 PROCEDURE — 96375 TX/PRO/DX INJ NEW DRUG ADDON: CPT

## 2024-08-15 PROCEDURE — 93005 ELECTROCARDIOGRAM TRACING: CPT | Performed by: STUDENT IN AN ORGANIZED HEALTH CARE EDUCATION/TRAINING PROGRAM

## 2024-08-15 PROCEDURE — 6360000002 HC RX W HCPCS: Performed by: STUDENT IN AN ORGANIZED HEALTH CARE EDUCATION/TRAINING PROGRAM

## 2024-08-15 PROCEDURE — 2060000000 HC ICU INTERMEDIATE R&B

## 2024-08-15 PROCEDURE — 99285 EMERGENCY DEPT VISIT HI MDM: CPT

## 2024-08-15 RX ORDER — AZELASTINE 1 MG/ML
2 SPRAY, METERED NASAL 2 TIMES DAILY
Status: ON HOLD | COMMUNITY
End: 2024-09-16 | Stop reason: HOSPADM

## 2024-08-15 RX ORDER — LIDOCAINE 4 G/G
1 PATCH TOPICAL DAILY
Status: DISCONTINUED | OUTPATIENT
Start: 2024-08-15 | End: 2024-08-24 | Stop reason: HOSPADM

## 2024-08-15 RX ORDER — IPRATROPIUM BROMIDE AND ALBUTEROL SULFATE 2.5; .5 MG/3ML; MG/3ML
1 SOLUTION RESPIRATORY (INHALATION)
Status: COMPLETED | OUTPATIENT
Start: 2024-08-15 | End: 2024-08-15

## 2024-08-15 RX ORDER — NYSTATIN 100000 [USP'U]/ML
500000 SUSPENSION ORAL
Status: ON HOLD | COMMUNITY
End: 2024-09-16 | Stop reason: HOSPADM

## 2024-08-15 RX ORDER — HYDROXYZINE HYDROCHLORIDE 10 MG/1
10 TABLET, FILM COATED ORAL EVERY 6 HOURS PRN
Status: DISCONTINUED | OUTPATIENT
Start: 2024-08-15 | End: 2024-08-24 | Stop reason: HOSPADM

## 2024-08-15 RX ORDER — HYDROXYZINE HYDROCHLORIDE 10 MG/1
10 TABLET, FILM COATED ORAL EVERY 6 HOURS PRN
COMMUNITY

## 2024-08-15 RX ORDER — HEPARIN SODIUM 5000 [USP'U]/ML
5000 INJECTION, SOLUTION INTRAVENOUS; SUBCUTANEOUS EVERY 8 HOURS SCHEDULED
Status: ON HOLD | COMMUNITY
End: 2024-08-23 | Stop reason: HOSPADM

## 2024-08-15 RX ORDER — GUAIFENESIN 200 MG/10ML
200 LIQUID ORAL 4 TIMES DAILY PRN
Status: DISCONTINUED | OUTPATIENT
Start: 2024-08-15 | End: 2024-08-24 | Stop reason: HOSPADM

## 2024-08-15 RX ORDER — FERROUS SULFATE 325(65) MG
325 TABLET ORAL
Status: DISCONTINUED | OUTPATIENT
Start: 2024-08-16 | End: 2024-08-24 | Stop reason: HOSPADM

## 2024-08-15 RX ORDER — INSULIN LISPRO 100 [IU]/ML
0-16 INJECTION, SOLUTION INTRAVENOUS; SUBCUTANEOUS
Status: DISCONTINUED | OUTPATIENT
Start: 2024-08-15 | End: 2024-08-24 | Stop reason: HOSPADM

## 2024-08-15 RX ORDER — POTASSIUM CHLORIDE 1500 MG/1
40 TABLET, EXTENDED RELEASE ORAL PRN
Status: DISCONTINUED | OUTPATIENT
Start: 2024-08-15 | End: 2024-08-24 | Stop reason: HOSPADM

## 2024-08-15 RX ORDER — CALCIUM CARBONATE 500 MG/1
1 TABLET, CHEWABLE ORAL 2 TIMES DAILY
COMMUNITY

## 2024-08-15 RX ORDER — MAGNESIUM SULFATE IN WATER 40 MG/ML
2000 INJECTION, SOLUTION INTRAVENOUS PRN
Status: DISCONTINUED | OUTPATIENT
Start: 2024-08-15 | End: 2024-08-24 | Stop reason: HOSPADM

## 2024-08-15 RX ORDER — ALBUTEROL SULFATE 90 UG/1
1 INHALANT RESPIRATORY (INHALATION) EVERY 4 HOURS PRN
Status: DISCONTINUED | OUTPATIENT
Start: 2024-08-15 | End: 2024-08-24 | Stop reason: HOSPADM

## 2024-08-15 RX ORDER — SODIUM CHLORIDE 0.9 % (FLUSH) 0.9 %
5-40 SYRINGE (ML) INJECTION PRN
Status: DISCONTINUED | OUTPATIENT
Start: 2024-08-15 | End: 2024-08-24 | Stop reason: HOSPADM

## 2024-08-15 RX ORDER — POLYETHYLENE GLYCOL 3350 17 G/17G
17 POWDER, FOR SOLUTION ORAL DAILY PRN
Status: DISCONTINUED | OUTPATIENT
Start: 2024-08-15 | End: 2024-08-24 | Stop reason: HOSPADM

## 2024-08-15 RX ORDER — CLONAZEPAM 0.5 MG/1
0.25 TABLET ORAL 2 TIMES DAILY PRN
Status: DISCONTINUED | OUTPATIENT
Start: 2024-08-15 | End: 2024-08-24 | Stop reason: HOSPADM

## 2024-08-15 RX ORDER — LANOLIN ALCOHOL/MO/W.PET/CERES
400 CREAM (GRAM) TOPICAL DAILY
Status: DISCONTINUED | OUTPATIENT
Start: 2024-08-15 | End: 2024-08-24 | Stop reason: HOSPADM

## 2024-08-15 RX ORDER — LINEZOLID 600 MG/1
600 TABLET, FILM COATED ORAL 2 TIMES DAILY
Status: ON HOLD | COMMUNITY
End: 2024-08-23 | Stop reason: HOSPADM

## 2024-08-15 RX ORDER — FLUCONAZOLE 100 MG/1
200 TABLET ORAL DAILY
Status: DISCONTINUED | OUTPATIENT
Start: 2024-08-15 | End: 2024-08-21

## 2024-08-15 RX ORDER — INSULIN GLARGINE 100 [IU]/ML
32 INJECTION, SOLUTION SUBCUTANEOUS NIGHTLY
Status: DISCONTINUED | OUTPATIENT
Start: 2024-08-15 | End: 2024-08-24

## 2024-08-15 RX ORDER — POLYETHYLENE GLYCOL 3350 17 G/17G
17 POWDER, FOR SOLUTION ORAL DAILY
COMMUNITY

## 2024-08-15 RX ORDER — CLONAZEPAM 0.25 MG/1
0.12 TABLET, ORALLY DISINTEGRATING ORAL 2 TIMES DAILY PRN
COMMUNITY

## 2024-08-15 RX ORDER — SODIUM CHLORIDE 0.9 % (FLUSH) 0.9 %
5-40 SYRINGE (ML) INJECTION EVERY 12 HOURS SCHEDULED
Status: DISCONTINUED | OUTPATIENT
Start: 2024-08-15 | End: 2024-08-24 | Stop reason: HOSPADM

## 2024-08-15 RX ORDER — SODIUM BICARBONATE 650 MG/1
650 TABLET ORAL 2 TIMES DAILY
Status: ON HOLD | COMMUNITY
End: 2024-08-23 | Stop reason: HOSPADM

## 2024-08-15 RX ORDER — IPRATROPIUM BROMIDE AND ALBUTEROL SULFATE 2.5; .5 MG/3ML; MG/3ML
1 SOLUTION RESPIRATORY (INHALATION) EVERY 4 HOURS PRN
Status: DISCONTINUED | OUTPATIENT
Start: 2024-08-15 | End: 2024-08-24 | Stop reason: HOSPADM

## 2024-08-15 RX ORDER — INSULIN GLARGINE 100 [IU]/ML
80 INJECTION, SOLUTION SUBCUTANEOUS DAILY
Status: DISCONTINUED | OUTPATIENT
Start: 2024-08-16 | End: 2024-08-24

## 2024-08-15 RX ORDER — ASPIRIN 81 MG/1
81 TABLET, CHEWABLE ORAL DAILY
Status: DISCONTINUED | OUTPATIENT
Start: 2024-08-16 | End: 2024-08-24 | Stop reason: HOSPADM

## 2024-08-15 RX ORDER — FLUCONAZOLE 100 MG/1
200 TABLET ORAL DAILY
Status: ON HOLD | COMMUNITY
End: 2024-08-23 | Stop reason: HOSPADM

## 2024-08-15 RX ORDER — BUDESONIDE AND FORMOTEROL FUMARATE DIHYDRATE 80; 4.5 UG/1; UG/1
2 AEROSOL RESPIRATORY (INHALATION)
Status: DISCONTINUED | OUTPATIENT
Start: 2024-08-15 | End: 2024-08-24 | Stop reason: HOSPADM

## 2024-08-15 RX ORDER — OXYCODONE HYDROCHLORIDE 5 MG/1
5 TABLET ORAL EVERY 8 HOURS PRN
Status: DISCONTINUED | OUTPATIENT
Start: 2024-08-15 | End: 2024-08-17

## 2024-08-15 RX ORDER — FLUTICASONE PROPIONATE 50 MCG
2 SPRAY, SUSPENSION (ML) NASAL DAILY
COMMUNITY

## 2024-08-15 RX ORDER — ATORVASTATIN CALCIUM 40 MG/1
40 TABLET, FILM COATED ORAL NIGHTLY
Status: DISCONTINUED | OUTPATIENT
Start: 2024-08-15 | End: 2024-08-24 | Stop reason: HOSPADM

## 2024-08-15 RX ORDER — HYDRALAZINE HYDROCHLORIDE 25 MG/1
25 TABLET, FILM COATED ORAL EVERY 6 HOURS PRN
Status: ON HOLD | COMMUNITY
End: 2024-09-16 | Stop reason: HOSPADM

## 2024-08-15 RX ORDER — SENNOSIDES A AND B 8.6 MG/1
1 TABLET, FILM COATED ORAL DAILY
COMMUNITY

## 2024-08-15 RX ORDER — AZELASTINE 1 MG/ML
2 SPRAY, METERED NASAL 2 TIMES DAILY
Status: DISCONTINUED | OUTPATIENT
Start: 2024-08-15 | End: 2024-08-15

## 2024-08-15 RX ORDER — POTASSIUM CHLORIDE 7.45 MG/ML
10 INJECTION INTRAVENOUS PRN
Status: DISCONTINUED | OUTPATIENT
Start: 2024-08-15 | End: 2024-08-24 | Stop reason: HOSPADM

## 2024-08-15 RX ORDER — SODIUM CHLORIDE 9 MG/ML
INJECTION, SOLUTION INTRAVENOUS PRN
Status: DISCONTINUED | OUTPATIENT
Start: 2024-08-15 | End: 2024-08-24 | Stop reason: HOSPADM

## 2024-08-15 RX ORDER — ONDANSETRON 4 MG/1
4 TABLET, ORALLY DISINTEGRATING ORAL EVERY 8 HOURS PRN
Status: DISCONTINUED | OUTPATIENT
Start: 2024-08-15 | End: 2024-08-24 | Stop reason: HOSPADM

## 2024-08-15 RX ORDER — TAMSULOSIN HYDROCHLORIDE 0.4 MG/1
0.8 CAPSULE ORAL DAILY
Status: DISCONTINUED | OUTPATIENT
Start: 2024-08-15 | End: 2024-08-24 | Stop reason: HOSPADM

## 2024-08-15 RX ORDER — SIMETHICONE 80 MG
80 TABLET,CHEWABLE ORAL EVERY 6 HOURS PRN
COMMUNITY

## 2024-08-15 RX ORDER — FUROSEMIDE 10 MG/ML
40 INJECTION INTRAMUSCULAR; INTRAVENOUS
Status: COMPLETED | OUTPATIENT
Start: 2024-08-15 | End: 2024-08-15

## 2024-08-15 RX ORDER — TAMSULOSIN HYDROCHLORIDE 0.4 MG/1
0.8 CAPSULE ORAL DAILY
COMMUNITY

## 2024-08-15 RX ORDER — ACETAMINOPHEN 325 MG/1
650 TABLET ORAL EVERY 6 HOURS PRN
Status: DISCONTINUED | OUTPATIENT
Start: 2024-08-15 | End: 2024-08-24 | Stop reason: HOSPADM

## 2024-08-15 RX ORDER — LINEZOLID 600 MG/1
600 TABLET, FILM COATED ORAL EVERY 12 HOURS SCHEDULED
Status: COMPLETED | OUTPATIENT
Start: 2024-08-15 | End: 2024-08-19

## 2024-08-15 RX ORDER — FLUTICASONE PROPIONATE 50 MCG
2 SPRAY, SUSPENSION (ML) NASAL DAILY
Status: DISCONTINUED | OUTPATIENT
Start: 2024-08-15 | End: 2024-08-24 | Stop reason: HOSPADM

## 2024-08-15 RX ORDER — GABAPENTIN 400 MG/1
400 CAPSULE ORAL 4 TIMES DAILY
Status: DISCONTINUED | OUTPATIENT
Start: 2024-08-15 | End: 2024-08-15

## 2024-08-15 RX ORDER — AZITHROMYCIN 500 MG/1
500 TABLET, FILM COATED ORAL ONCE
Status: COMPLETED | OUTPATIENT
Start: 2024-08-15 | End: 2024-08-15

## 2024-08-15 RX ORDER — SODIUM HYPOCHLORITE 1.25 MG/ML
SOLUTION TOPICAL DAILY
COMMUNITY

## 2024-08-15 RX ORDER — SORBITOL SOLUTION 70 %
30 SOLUTION, ORAL MISCELLANEOUS DAILY PRN
COMMUNITY

## 2024-08-15 RX ORDER — SENNOSIDES A AND B 8.6 MG/1
1 TABLET, FILM COATED ORAL DAILY
Status: DISCONTINUED | OUTPATIENT
Start: 2024-08-16 | End: 2024-08-24 | Stop reason: HOSPADM

## 2024-08-15 RX ORDER — LIDOCAINE 50 MG/G
1 PATCH TOPICAL DAILY
COMMUNITY

## 2024-08-15 RX ORDER — ASCORBIC ACID 500 MG
1000 TABLET ORAL 2 TIMES DAILY
Status: DISCONTINUED | OUTPATIENT
Start: 2024-08-15 | End: 2024-08-24 | Stop reason: HOSPADM

## 2024-08-15 RX ORDER — LORATADINE 10 MG/1
10 TABLET ORAL DAILY
Status: ON HOLD | COMMUNITY
End: 2024-08-23 | Stop reason: HOSPADM

## 2024-08-15 RX ORDER — SODIUM BICARBONATE 650 MG/1
650 TABLET ORAL 2 TIMES DAILY
Status: DISCONTINUED | OUTPATIENT
Start: 2024-08-15 | End: 2024-08-16

## 2024-08-15 RX ORDER — ONDANSETRON 2 MG/ML
4 INJECTION INTRAMUSCULAR; INTRAVENOUS EVERY 6 HOURS PRN
Status: DISCONTINUED | OUTPATIENT
Start: 2024-08-15 | End: 2024-08-24 | Stop reason: HOSPADM

## 2024-08-15 RX ORDER — SIMETHICONE 80 MG
80 TABLET,CHEWABLE ORAL EVERY 6 HOURS PRN
Status: DISCONTINUED | OUTPATIENT
Start: 2024-08-15 | End: 2024-08-24 | Stop reason: HOSPADM

## 2024-08-15 RX ORDER — LANOLIN ALCOHOL/MO/W.PET/CERES
3 CREAM (GRAM) TOPICAL NIGHTLY
Status: DISCONTINUED | OUTPATIENT
Start: 2024-08-15 | End: 2024-08-24 | Stop reason: HOSPADM

## 2024-08-15 RX ORDER — ZINC SULFATE 50(220)MG
50 CAPSULE ORAL DAILY
COMMUNITY

## 2024-08-15 RX ORDER — CALCIUM CARBONATE 500 MG/1
1 TABLET, CHEWABLE ORAL 2 TIMES DAILY
Status: DISCONTINUED | OUTPATIENT
Start: 2024-08-15 | End: 2024-08-24 | Stop reason: HOSPADM

## 2024-08-15 RX ORDER — PANTOPRAZOLE SODIUM 40 MG/1
40 TABLET, DELAYED RELEASE ORAL
Status: DISCONTINUED | OUTPATIENT
Start: 2024-08-16 | End: 2024-08-24 | Stop reason: HOSPADM

## 2024-08-15 RX ORDER — ONDANSETRON 4 MG/1
4 TABLET, FILM COATED ORAL EVERY 8 HOURS PRN
Status: ON HOLD | COMMUNITY
End: 2024-08-23 | Stop reason: HOSPADM

## 2024-08-15 RX ORDER — AZITHROMYCIN 500 MG/1
250 TABLET, FILM COATED ORAL DAILY
Status: COMPLETED | OUTPATIENT
Start: 2024-08-16 | End: 2024-08-19

## 2024-08-15 RX ORDER — METHYLPREDNISOLONE SODIUM SUCCINATE 125 MG/2ML
125 INJECTION, POWDER, LYOPHILIZED, FOR SOLUTION INTRAMUSCULAR; INTRAVENOUS ONCE
Status: COMPLETED | OUTPATIENT
Start: 2024-08-15 | End: 2024-08-15

## 2024-08-15 RX ORDER — AZELASTINE 1 MG/ML
2 SPRAY, METERED NASAL 2 TIMES DAILY
Status: DISCONTINUED | OUTPATIENT
Start: 2024-08-15 | End: 2024-08-24 | Stop reason: HOSPADM

## 2024-08-15 RX ORDER — HYDRALAZINE HYDROCHLORIDE 25 MG/1
25 TABLET, FILM COATED ORAL EVERY 6 HOURS PRN
Status: DISCONTINUED | OUTPATIENT
Start: 2024-08-15 | End: 2024-08-24 | Stop reason: HOSPADM

## 2024-08-15 RX ORDER — INSULIN LISPRO 100 [IU]/ML
0-4 INJECTION, SOLUTION INTRAVENOUS; SUBCUTANEOUS NIGHTLY
Status: DISCONTINUED | OUTPATIENT
Start: 2024-08-15 | End: 2024-08-24 | Stop reason: HOSPADM

## 2024-08-15 RX ORDER — NEPHROCAP 1 MG
1 CAP ORAL EVERY MORNING
Status: DISCONTINUED | OUTPATIENT
Start: 2024-08-16 | End: 2024-08-24 | Stop reason: HOSPADM

## 2024-08-15 RX ORDER — CETIRIZINE HYDROCHLORIDE 10 MG/1
10 TABLET ORAL NIGHTLY
Status: DISCONTINUED | OUTPATIENT
Start: 2024-08-15 | End: 2024-08-24 | Stop reason: HOSPADM

## 2024-08-15 RX ORDER — CETIRIZINE HYDROCHLORIDE 10 MG/1
10 TABLET ORAL DAILY
COMMUNITY

## 2024-08-15 RX ORDER — NYSTATIN 100000 [USP'U]/ML
500000 SUSPENSION ORAL
Status: DISCONTINUED | OUTPATIENT
Start: 2024-08-15 | End: 2024-08-19

## 2024-08-15 RX ORDER — POTASSIUM CHLORIDE 1.5 G/1.58G
40 POWDER, FOR SOLUTION ORAL 2 TIMES DAILY
Status: DISCONTINUED | OUTPATIENT
Start: 2024-08-15 | End: 2024-08-16

## 2024-08-15 RX ORDER — FUROSEMIDE 10 MG/ML
60 INJECTION INTRAMUSCULAR; INTRAVENOUS 3 TIMES DAILY
Status: DISCONTINUED | OUTPATIENT
Start: 2024-08-15 | End: 2024-08-17

## 2024-08-15 RX ORDER — LANOLIN ALCOHOL/MO/W.PET/CERES
3 CREAM (GRAM) TOPICAL DAILY
COMMUNITY

## 2024-08-15 RX ORDER — DEXAMETHASONE 4 MG/1
6 TABLET ORAL DAILY
Status: DISPENSED | OUTPATIENT
Start: 2024-08-15 | End: 2024-08-22

## 2024-08-15 RX ORDER — SORBITOL SOLUTION 70 %
30 SOLUTION, ORAL MISCELLANEOUS DAILY PRN
Status: DISCONTINUED | OUTPATIENT
Start: 2024-08-15 | End: 2024-08-24 | Stop reason: HOSPADM

## 2024-08-15 RX ORDER — POTASSIUM CHLORIDE 1500 MG/1
40 TABLET, EXTENDED RELEASE ORAL PRN
Status: DISCONTINUED | OUTPATIENT
Start: 2024-08-15 | End: 2024-08-15

## 2024-08-15 RX ORDER — ACETAMINOPHEN 650 MG/1
650 SUPPOSITORY RECTAL EVERY 6 HOURS PRN
Status: DISCONTINUED | OUTPATIENT
Start: 2024-08-15 | End: 2024-08-24 | Stop reason: HOSPADM

## 2024-08-15 RX ORDER — VITAMIN B COMPLEX
1 CAPSULE ORAL DAILY
Status: DISCONTINUED | OUTPATIENT
Start: 2024-08-15 | End: 2024-08-24 | Stop reason: HOSPADM

## 2024-08-15 RX ORDER — GABAPENTIN 400 MG/1
400 CAPSULE ORAL 2 TIMES DAILY
Status: DISCONTINUED | OUTPATIENT
Start: 2024-08-15 | End: 2024-08-17

## 2024-08-15 RX ORDER — LANOLIN ALCOHOL/MO/W.PET/CERES
400 CREAM (GRAM) TOPICAL DAILY
Status: ON HOLD | COMMUNITY
End: 2024-09-16 | Stop reason: HOSPADM

## 2024-08-15 RX ORDER — ZINC SULFATE 50(220)MG
50 CAPSULE ORAL DAILY
Status: DISCONTINUED | OUTPATIENT
Start: 2024-08-16 | End: 2024-08-24 | Stop reason: HOSPADM

## 2024-08-15 RX ORDER — M-VIT,TX,IRON,MINS/CALC/FOLIC 27MG-0.4MG
1 TABLET ORAL DAILY
COMMUNITY

## 2024-08-15 RX ORDER — VITAMIN E 268 MG
400 CAPSULE ORAL 2 TIMES DAILY
Status: DISCONTINUED | OUTPATIENT
Start: 2024-08-16 | End: 2024-08-24 | Stop reason: HOSPADM

## 2024-08-15 RX ADMIN — METHYLPREDNISOLONE SODIUM SUCCINATE 125 MG: 125 INJECTION INTRAMUSCULAR; INTRAVENOUS at 13:33

## 2024-08-15 RX ADMIN — Medication 2 PUFF: at 20:23

## 2024-08-15 RX ADMIN — ACETAMINOPHEN 650 MG: 325 TABLET ORAL at 23:12

## 2024-08-15 RX ADMIN — FLUCONAZOLE 200 MG: 100 TABLET ORAL at 22:44

## 2024-08-15 RX ADMIN — INSULIN GLARGINE 32 UNITS: 100 INJECTION, SOLUTION SUBCUTANEOUS at 23:13

## 2024-08-15 RX ADMIN — FUROSEMIDE 40 MG: 10 INJECTION, SOLUTION INTRAMUSCULAR; INTRAVENOUS at 13:32

## 2024-08-15 RX ADMIN — CALCIUM CARBONATE 500 MG: 500 TABLET, CHEWABLE ORAL at 22:41

## 2024-08-15 RX ADMIN — Medication 3 MG: at 22:42

## 2024-08-15 RX ADMIN — TAMSULOSIN HYDROCHLORIDE 0.8 MG: 0.4 CAPSULE ORAL at 22:40

## 2024-08-15 RX ADMIN — CETIRIZINE HYDROCHLORIDE 10 MG: 10 TABLET, FILM COATED ORAL at 22:44

## 2024-08-15 RX ADMIN — SODIUM BICARBONATE 650 MG: 650 TABLET ORAL at 22:42

## 2024-08-15 RX ADMIN — AZITHROMYCIN DIHYDRATE 500 MG: 500 TABLET ORAL at 22:39

## 2024-08-15 RX ADMIN — OXYCODONE HYDROCHLORIDE AND ACETAMINOPHEN 1000 MG: 500 TABLET ORAL at 22:43

## 2024-08-15 RX ADMIN — SODIUM CHLORIDE, PRESERVATIVE FREE 10 ML: 5 INJECTION INTRAVENOUS at 22:49

## 2024-08-15 RX ADMIN — IPRATROPIUM BROMIDE AND ALBUTEROL SULFATE 1 DOSE: .5; 3 SOLUTION RESPIRATORY (INHALATION) at 13:18

## 2024-08-15 RX ADMIN — LINEZOLID 600 MG: 600 TABLET, FILM COATED ORAL at 22:38

## 2024-08-15 RX ADMIN — NYSTATIN 500000 UNITS: 100000 SUSPENSION ORAL at 22:47

## 2024-08-15 RX ADMIN — ATORVASTATIN CALCIUM 40 MG: 40 TABLET, FILM COATED ORAL at 22:39

## 2024-08-15 RX ADMIN — Medication 400 MG: at 22:43

## 2024-08-15 RX ADMIN — FUROSEMIDE 60 MG: 10 INJECTION, SOLUTION INTRAMUSCULAR; INTRAVENOUS at 22:46

## 2024-08-15 RX ADMIN — OXYCODONE HYDROCHLORIDE 5 MG: 5 TABLET ORAL at 23:13

## 2024-08-15 ASSESSMENT — PAIN DESCRIPTION - LOCATION
LOCATION: BACK
LOCATION: BACK

## 2024-08-15 ASSESSMENT — COPD QUESTIONNAIRES
QUESTION3_CHESTTIGHTNESS: 4
TOTAL_EXACERBATIONS_PASTYEAR: 1
QUESTION7_SLEEPQUALITY: 2
QUESTION5_HOMEACTIVITIES: 3
QUESTION1_COUGHFREQUENCY: 4
QUESTION2_CHESTPHLEGM: 3
CAT_TOTALSCORE: 26
QUESTION6_LEAVINGHOUSE: 3
QUESTION8_ENERGYLEVEL: 3
QUESTION4_WALKINCLINE: 4

## 2024-08-15 ASSESSMENT — PAIN DESCRIPTION - ORIENTATION
ORIENTATION: POSTERIOR
ORIENTATION: POSTERIOR

## 2024-08-15 ASSESSMENT — PAIN SCALES - GENERAL
PAINLEVEL_OUTOF10: 10
PAINLEVEL_OUTOF10: 10

## 2024-08-15 ASSESSMENT — PAIN - FUNCTIONAL ASSESSMENT: PAIN_FUNCTIONAL_ASSESSMENT: NONE - DENIES PAIN

## 2024-08-15 ASSESSMENT — PAIN DESCRIPTION - DESCRIPTORS
DESCRIPTORS: ACHING
DESCRIPTORS: ACHING

## 2024-08-15 NOTE — PROGRESS NOTES
4 Eyes Skin Assessment     NAME:  Shantanu Casillas  YOB: 1955  MEDICAL RECORD NUMBER:  [id number]    The patient is being assessed for  Admission    I agree that at least one RN has performed a thorough Head to Toe Skin Assessment on the patient. ALL assessment sites listed below have been assessed.      Areas assessed by both nurses:    Head, Face, Ears, Shoulders, Back, Chest, Arms, Elbows, Hands, Sacrum. Buttock, Coccyx, Ischium, Legs. Feet and Heels, and Under Medical Devices         Does the Patient have a Wound? Yes wound(s) were present on assessment. LDA wound assessment was Initiated and completed by RN  BRUISING  UPPER QUADRANT OF ABDOMEN  EXCORIATION BREAST FOLDS AND ABDOMINAL FOLDS, BUTTOCKS  BILATERAL LOWER LEGS, LEFT HEELS WOUND  Homar Prevention initiated by RN: Yes  Wound Care Orders initiated by RN: Yes    Pressure Injury (Stage 3,4, Unstageable, DTI, NWPT, and Complex wounds) if present, place Wound referral order by RN under : Yes    New Ostomies, if present place, Ostomy referral order under : No     Nurse 1 eSignature: Electronically signed by Chaya Beyer RN on 8/15/24 at 7:02 PM EDT    **SHARE this note so that the co-signing nurse can place an eSignature**    Nurse 2 eSignature: Electronically signed by Dolores Hernandez RN on 8/15/24 at 7:09 PM EDT

## 2024-08-15 NOTE — CONSULTS
Nephrology Consultation          Patient: Shantanu Casillas MRN: [id number]  SSN: [id number]    YOB: 1955  Age: 68 y.o.  Sex: male      Subjective:   Reason for the consultation. DEMARCO on CKD III and volume overload  HPI: The pt is 68 yrs old man with underlying history of coronary artery disease, COPD, chronic hypoxic hypercapnic respiratory failure, congestive heart failure with LV systolic and diastolic dysfunction, obstructive sleep apnea, chronic kidney disease stage III, recurrent DEMARCO's presented to the ER with worsening shortness of breath and lower extremity swelling.  Chest x-ray shows pulmonary edema.  On arrival in the ER he was severely hypertensive.  His initial chemistry showed elevated BUN of 67 and creatinine 3.66.  He is with recent multiple acute kidney injuries last creatinine was 1.8 on 8/6.  Noticed to have bilateral lower extremity swelling.  He is on nasal cannula 3 L.    Past Medical History:   Diagnosis Date    Arthritis     CAD (coronary artery disease)     Chronic obstructive pulmonary disease (HCC)     Diabetes (HCC)     Gout     Hypertension     Ill-defined condition     Sleep apnea     cpap     Past Surgical History:   Procedure Laterality Date    FOOT DEBRIDEMENT Right 12/15/2023    INCISION AND DRAINAGE RIGHT FIRST METATARSAL PHALANGEAL JOINT performed by Polo Oviedo DPM at [facility] MAIN OR    FOOT SURGERY      right heel - foriegn object    HX VEIN ABLATION ADHESIVE      ORTHOPEDIC SURGERY      back surgery    PACEMAKER      aicd -      Family History   Problem Relation Age of Onset    Diabetes Mother     Heart Disease Father     Hypertension Father     Diabetes Sister     Diabetes Brother     Hypertension Mother     Heart Disease Mother     Kidney Disease Mother      Social History     Tobacco Use    Smoking status: Never    Smokeless tobacco: Never   Substance Use Topics    Alcohol use: Not Currently      Current Facility-Administered Medications   Medication  81 MG chewable tablet Take 1 tablet by mouth daily      atorvastatin (LIPITOR) 40 MG tablet Take 1 tablet by mouth nightly at bedtime.      B Complex Vitamins (VITAMIN B COMPLEX) TABS Take 1 tablet by mouth daily      flunisolide (NASALIDE) 25 MCG/ACT (0.025%) SOLN 2 sprays 2 times daily      fluticasone-umeclidin-vilant (TRELEGY ELLIPTA) 100-62.5-25 MCG/ACT AEPB inhaler INHALE 1 INHALATION BY MOUTH ONCE DAILY      vitamin E 400 UNIT capsule Take 1 capsule by mouth 2 times daily          Allergies   Allergen Reactions    Amitriptyline Angioedema    Naproxen      Other reaction(s): Unknown (comments)  Pt not sure of reaction    Sulfa Antibiotics Nausea And Vomiting       Review of Systems:  A comprehensive review of systems was negative except for that written in the History of Present Illness.    Objective:     Vitals:    08/15/24 1220 08/15/24 1318   BP: (!) 150/132    Pulse: (!) 112 (!) 107   Resp: 18 15   Temp: 98.2 °F (36.8 °C)    TempSrc: Oral    SpO2: 100% 96%        Physical Exam:  General: NAD  Eyes: sclera anicteric  Oral Cavity: No thrush or ulcers  Neck: no JVD  Chest: Fair bilateral air entry  Heart: normal sounds  Abdomen: soft and non tender   : no juares  Lower Extremities: edema++  Skin: no rash  Neuro: intact  Psychiatric: non-depressed          Assessment/Plan:   Acute kidney injury on chronic kidney disease stage III  2/2 prerenal from cardiorenal syndrome  BUN/creatinine 67/3.66  Last creatinine 1.8 on 8/6/2024.  Difficult to determine exact baseline creatinine due to multiple recent DEMARCO's.  Volume up and hypotensive  Sending UA and UPCR  Has external catheter and voiding without any problem  Aggressive IV diuresis    Hyperkalemia  Due to acute kidney injury  Potassium 5.8  Medically treated  Potassium 5.1 today  Low potassium diet  IV diuresis will help lowering K    Acute CHF  Due to LV systolic and diastolic dysfunction  LVEF 40 to 45%  SOB and increasing swelling of extremities  Chest x-ray

## 2024-08-15 NOTE — CARE COORDINATION
08/15/24 7735   Service Assessment   Patient Orientation Alert and Oriented   Cognition Alert   History Provided By Patient   Primary Caregiver Self   Accompanied By/Relationship Wife Migdalia Casillas & daughter.   Support Systems Spouse/Significant Other;Children   Patient's Healthcare Decision Maker is: Legal Next of Kin   PCP Verified by CM Yes  (Ed Pino - seen in June/Fabio Roblero form [facility].)   Last Visit to PCP Within last 3 months   Prior Functional Level Assistance with the following:;Bathing;Dressing;Toileting;Cooking;Housework;Shopping;Mobility  (Walker)   Current Functional Level Assistance with the following:;Bathing;Dressing;Toileting;Cooking;Housework;Shopping;Mobility  (Walker)   Can patient return to prior living arrangement Unknown at present  (Family may want home.)   Ability to make needs known: Good   Family able to assist with home care needs: Yes   Would you like for me to discuss the discharge plan with any other family members/significant others, and if so, who? Yes  (Wife Migdalia Casillas)   Financial Resources Medicare   Community Resources None   CM/SW Referral Other (see comment)  (None)   Social/Functional History   Lives With Other (comment)  (Facility)   Type of Home Facility  (Pt's wife ( Migdalia) signed Choice Letter for [facility] -depending on dispo/Referral.)   Home Layout One level   Home Access Level entry   Bathroom Shower/Tub Walk-in shower;Shower chair with back   Bathroom Toilet Handicap height   Bathroom Equipment None   Bathroom Accessibility Accessible   Home Equipment Walker - Standard   Receives Help From Family;Other (comment)  (Facility)   ADL Assistance Needs assistance   Toileting Needs assistance   Homemaking Assistance Needs assistance   Homemaking Responsibilities Yes   Ambulation Assistance Needs assistance  (Walker)   Transfer Assistance Needs assistance   Active  No   Occupation Retired   Discharge Planning   Type of Residence  House;Acute Rehab   Living Arrangements Spouse/Significant Other   Current Services Prior To Admission None   Potential Assistance Needed N/A   DME Ordered? No   Potential Assistance Purchasing Medications No   Type of Home Care Services None   Patient expects to be discharged to: House  (Or IRF)   One/Two Story Residence One story   History of falls? 0   Services At/After Discharge   Transition of Care Consult (CM Consult) Discharge Planning   Services At/After Discharge None    Resource Information Provided? No   Mode of Transport at Discharge Other (see comment)  (Family/Transport)   Confirm Follow Up Transport Family  (Or transport.)     CM met with pt, wife ( Migdalia Casillas), & daughter & D/C Plan undecided @ this time. Pt from [facility] & family may take home. Wife signed Choice Letter for [facility]  - depending on dispo. Referrals sent via [facility]. Assist with ADLs/uses walker.      Readmission Assessment  Number of Days since last admission?: 8-30 days (D/C 7/30/24)  Previous Disposition: Acute Rehab  Who is being Interviewed: Patient, Caregiver (Wife)  What was the patient's/caregiver's perception as to why they think they needed to return back to the hospital?: Other (Comment) (SOB)  Did you visit your Primary Care Physician after you left the hospital, before you returned this time?: Yes (Seen by facility MD.)  Did you see a specialist, such as Cardiac, Pulmonary, Orthopedic Physician, etc. after you left the hospital?: Yes  Who advised the patient to return to the hospital?: Physician  Does the patient report anything that got in the way of taking their medications?: No  In our efforts to provide the best possible care to you and others like you, can you think of anything that we could have done to help you after you left the hospital the first time, so that you might not have needed to return so soon?: Other (Comment) (No suggestions.)    Advance Care Planning     General Advance

## 2024-08-15 NOTE — H&P
History and Physical    NAME:   Shantanu Casillas   :  1955   MRN:  [id number]     Date/Time: 8/15/2024 5:22 PM    Patient PCP: Ed Pino MD  ______________________________________________________________________       Subjective:     CHIEF COMPLAINT:     Shortness of breath    HISTORY OF PRESENT ILLNESS:     General Daily Progress Note  Patient is a 68 y.o. year old male with past medical history of CAD, COPD, CHF with a EF 40 to 45%, diabetes, chronic leg wounds morbid obesity was sent from [facility] for shortness of breath.  Patient in bed diagnosed with COVID on  and was admitted to [facility].  Patient was treated with antibiotics and found to have VRE UTI and started on Zyvox on .  Patient was discharged to [facility] rehab.  Patient started having elevated renal function and was taken off of his Bumex.  In the ED BUN 67 creatinine 3.66 BNP 3528 albumin 2.5 hemoglobin 8.5 hematocrit 27.2 repeat COVID test still positive.  Patient was given IV Lasix in the ED.  Patient seen in the ED noticeably short of breath with difficulty talking at times.  Patient to be admitted for further evaluation and treatment.    Past Medical History:   Diagnosis Date    Arthritis     CAD (coronary artery disease)     Chronic obstructive pulmonary disease (HCC)     Diabetes (HCC)     Gout     Hypertension     Ill-defined condition     Sleep apnea     cpap        Past Surgical History:   Procedure Laterality Date    FOOT DEBRIDEMENT Right 12/15/2023    INCISION AND DRAINAGE RIGHT FIRST METATARSAL PHALANGEAL JOINT performed by Polo Oviedo DPM at [facility] MAIN OR    FOOT SURGERY      right heel - foriegn object    HX VEIN ABLATION ADHESIVE      ORTHOPEDIC SURGERY      back surgery    PACEMAKER      aicd -       Social History     Tobacco Use    Smoking status: Never    Smokeless tobacco: Never   Substance Use Topics    Alcohol use: Not Currently        Family History   Problem Relation Age of Onset    Diabetes  Differential    Collection Time: 08/15/24  1:26 PM   Result Value Ref Range    WBC 6.7 4.1 - 11.1 K/uL    RBC 2.84 (L) 4.10 - 5.70 M/uL    Hemoglobin 8.5 (L) 12.1 - 17.0 g/dL    Hematocrit 27.2 (L) 36.6 - 50.3 %    MCV 95.8 80.0 - 99.0 FL    MCH 29.9 26.0 - 34.0 PG    MCHC 31.3 30.0 - 36.5 g/dL    RDW 20.1 (H) 11.5 - 14.5 %    Platelets 252 150 - 400 K/uL    MPV 10.1 8.9 - 12.9 FL    Nucleated RBCs 0.4 (H) 0.0  WBC    nRBC 0.03 (H) 0.00 - 0.01 K/uL    Neutrophils % 67 32 - 75 %    Lymphocytes % 17 12 - 49 %    Monocytes % 11 5 - 13 %    Eosinophils % 4 0 - 7 %    Basophils % 0 0 - 1 %    Immature Granulocytes % 1 (H) 0 - 0.5 %    Neutrophils Absolute 4.4 1.8 - 8.0 K/UL    Lymphocytes Absolute 1.2 0.8 - 3.5 K/UL    Monocytes Absolute 0.8 0.0 - 1.0 K/UL    Eosinophils Absolute 0.3 0.0 - 0.4 K/UL    Basophils Absolute 0.0 0.0 - 0.1 K/UL    Immature Granulocytes Absolute 0.1 (H) 0.00 - 0.04 K/UL    Differential Type AUTOMATED     Comprehensive Metabolic Panel    Collection Time: 08/15/24  1:26 PM   Result Value Ref Range    Sodium 138 136 - 145 mmol/L    Potassium 5.1 3.5 - 5.1 mmol/L    Chloride 104 97 - 108 mmol/L    CO2 27 21 - 32 mmol/L    Anion Gap 7 5 - 15 mmol/L    Glucose 133 (H) 65 - 100 mg/dL    BUN 67 (H) 6 - 20 mg/dL    Creatinine 3.66 (H) 0.70 - 1.30 mg/dL    BUN/Creatinine Ratio 18 12 - 20      Est, Glom Filt Rate 17 (L) >60 ml/min/1.73m2    Calcium 8.6 8.5 - 10.1 mg/dL    Total Bilirubin 0.4 0.2 - 1.0 mg/dL    AST 35 15 - 37 U/L    ALT 25 12 - 78 U/L    Alk Phosphatase 116 45 - 117 U/L    Total Protein 6.7 6.4 - 8.2 g/dL    Albumin 2.5 (L) 3.5 - 5.0 g/dL    Globulin 4.2 (H) 2.0 - 4.0 g/dL    Albumin/Globulin Ratio 0.6 (L) 1.1 - 2.2     Troponin    Collection Time: 08/15/24  1:26 PM   Result Value Ref Range    Troponin, High Sensitivity 26 0 - 76 ng/L   Brain Natriuretic Peptide    Collection Time: 08/15/24  1:26 PM   Result Value Ref Range    NT Pro-BNP 3,528 (H) <125 pg/mL   COVID-19, Rapid     Collection Time: 08/15/24  3:23 PM    Specimen: Nasopharyngeal   Result Value Ref Range    Source Swab      SARS-CoV-2, PCR DETECTED (A) Not Detected             Xray Result (most recent):  XR CHEST PORTABLE    Result Date: 8/15/2024  Cardiomegaly and mild pulmonary edema. Electronically signed by Nasir Sebastian       XR CHEST PORTABLE   Final Result   Cardiomegaly and mild pulmonary edema.      Electronically signed by Nasir Sebastian           Review of Systems:  Constitutional: Negative for chills and fever.   HENT: Negative.    Eyes: Negative.    Respiratory: Shortness of breath  Cardiovascular: Negative.    Gastrointestinal: Negative for abdominal pain and nausea.   Skin: Negative.    Neurological: Negative.      Objective:   VITALS:    Vitals:    08/15/24 1318   BP:    Pulse: (!) 107   Resp: 15   Temp:    SpO2: 96%       Physical Exam:   Constitutional: pt is oriented to person, place, and time.      Head: Normocephalic and atraumatic.   Eyes: Pupils are equal, round, and reactive to light. EOM are normal.   Cardiovascular: Normal rate, regular rhythm and normal heart sounds.  Anasarca  Pulmonary/Chest: Breath sounds normal.  Positive rhonchi throughout  Exhibits no tenderness.   Abdominal: Soft. Bowel sounds are normal. There is no abdominal tenderness. There is no rebound and no guarding.  Abdomen distended  Skin: Anasarca  Musculoskeletal: Normal range of motion.   Neurological: pt is alert and oriented to person, place, and time. Alert. Normal strength. No cranial nerve deficit or sensory deficit. Displays a negative Romberg sign.        ASSESSMENT & PLAN:  Fluid overload will have cardiology see patient nephrology consulted diuresing per nephrology  DEMARCO on CKD nephrology following  Systolic heart failure will have cardiology see patient  COVID-positive will start Decadron 6 mg daily and Zithromax will have infectious disease see patient  VRE Enterococcus UTI continue Zyvox and to the 19th  Candida UTI on

## 2024-08-15 NOTE — ED PROVIDER NOTES
[facility] EMERGENCY DEPT  EMERGENCY DEPARTMENT HISTORY AND PHYSICAL EXAM      Date: 8/15/2024  Patient Name: Shantanu Casillas  MRN: [id number]  Birthdate 1955  Date of evaluation: 8/15/2024  Provider: Tonio Melendrez MD   Note Started: 2:25 PM EDT 8/15/24    HISTORY OF PRESENT ILLNESS     Chief Complaint   Patient presents with    Shortness of Breath     Since last night patient is having SOB, Patient was DX with COVID a week ago. Patient  also is on ATB for UTI. Here for further evaluation.        History Provided By: Patient    HPI: Shantanu Casillas is a 68 y.o. male with past medical history as reviewed below presents for evaluation of dyspnea.  Patient reports he was diagnosed with COVID 1 week prior, and has been increasingly short of breath since then.  Also states he was taken off of his diuretic and has had increasing swelling in the legs and abdomen.  He has a productive cough, states he feels like he cannot \"get it out \".  No nausea or vomiting.  No fevers at home.    PAST MEDICAL HISTORY   Past Medical History:  Past Medical History:   Diagnosis Date    Arthritis     CAD (coronary artery disease)     Chronic obstructive pulmonary disease (HCC)     Diabetes (HCC)     Gout     Hypertension     Ill-defined condition     Sleep apnea     cpap       Past Surgical History:  Past Surgical History:   Procedure Laterality Date    FOOT DEBRIDEMENT Right 12/15/2023    INCISION AND DRAINAGE RIGHT FIRST METATARSAL PHALANGEAL JOINT performed by Polo Oviedo DPM at [facility] MAIN OR    FOOT SURGERY      right heel - foriegn object    HX VEIN ABLATION ADHESIVE      ORTHOPEDIC SURGERY      back surgery    PACEMAKER      aicd -       Family History:  Family History   Problem Relation Age of Onset    Diabetes Mother     Heart Disease Father     Hypertension Father     Diabetes Sister     Diabetes Brother     Hypertension Mother     Heart Disease Mother     Kidney Disease Mother        Social History:  Social History  Sepsis reassessment not applicable    Clinical Management Tools:  Not Applicable    Vitals:    Vitals:    08/15/24 1220 08/15/24 1318   BP: (!) 150/132    Pulse: (!) 112 (!) 107   Resp: 18 15   Temp: 98.2 °F (36.8 °C)    TempSrc: Oral    SpO2: 100% 96%        Patient was given the following medications:  Medications   methylPREDNISolone sodium succ (SOLU-MEDROL) injection 125 mg (125 mg IntraVENous Given 8/15/24 1333)   ipratropium 0.5 mg-albuterol 2.5 mg (DUONEB) nebulizer solution 1 Dose (1 Dose Inhalation Given 8/15/24 1318)   furosemide (LASIX) injection 40 mg (40 mg IntraVENous Given 8/15/24 1332)       CONSULTS: (Who and What was discussed)  None     Social Determinants affecting Dx or Tx: None    PROCEDURES   Unless otherwise noted above, none  Procedures      CRITICAL CARE TIME   Patient does not meet Critical Care Time, 0 minutes    ED FINAL IMPRESSION     1. Hypervolemia, unspecified hypervolemia type    2. COVID-19    3. DEMARCO (acute kidney injury) (HCC)          DISPOSITION/PLAN   DISPOSITION Admitted 08/15/2024 02:05:18 PM  Condition at Disposition: Data Unavailable    Admit Note: Pt is being admitted by hospitalist. The results of their tests and reason(s) for their admission have been discussed with pt and/or available family. They convey agreement and understanding for the need to be admitted and for the admission diagnosis.     PATIENT REFERRED TO:  No follow-up provider specified.      DISCHARGE MEDICATIONS:     Medication List        ASK your doctor about these medications      albuterol sulfate  (90 Base) MCG/ACT inhaler  Commonly known as: PROVENTIL;VENTOLIN;PROAIR     allopurinol 100 MG tablet  Commonly known as: ZYLOPRIM  Take 2 tablets by mouth daily     ascorbic acid 500 MG tablet  Commonly known as: VITAMIN C     aspirin 81 MG chewable tablet     atorvastatin 40 MG tablet  Commonly known as: LIPITOR     bumetanide 2 MG tablet  Commonly known as: BUMEX     ferrous sulfate 325 (65 Fe) MG

## 2024-08-15 NOTE — PROGRESS NOTES
Pulmonary Disease Navigator Note  Dandy Martínez [facility]    Current GOLD classification for Shantanu Casillas    Patient's chart was reviewed by Pulmonary Disease Navigator for compliance with prescribed treatment with Global Initiative For Chronic Obstructive Lung Disease (GOLD).    Please, review beneath recommendations for pharmacological treatment for patient with obstructive lung disease.     Current Pharmacological Treatment:      Patient currently in ER and has received one Duoneb treatment at this time.   Current eosinophil count: 0.3  Recorded domestic exacerbations past 12 months: 1          Combination  ICS-LABA Inhaler Acceptable   Therapy  Device For Use   Salmeterol/fluticasone Advair  Diskus    Vilanterol/fluticasone  Breo  Ellipta    Formoterol/mometasone  Dulera MDI Yes   Formoterol/budesonide  Symbicort MDI Yes   Triple Therapy Recommended (For Group E) [id number]     Fluticasone/umeclidinium/vilanterol  Trelegy  Ellipta    Budesonide/glycopyrrolate/formoterol fumarate  Breztri  MDI Yes   Recommended (For Group A & B) LAMA/LABA     Vilanterol/umeclidinium  Anoro  Ellipta    Olodaterol/tiotropium  Stiolto  Respimat Yes   Formoterol/glycopyrronium  Bevespi  MDI Yes   Aclidinium/formoterol Duaklir  Pressair      *Nebulizer Options    LAMA LABA ICS   Yupekeithi  Brovanna Budesonide    Performist      The CAT provides a reliable measure of the impact of COPD on a patient's health status. Range of CAT scores from 0-40. Higher scores denote a more severe impact of COPD on a patient’s life. Scores <10 have a low impact, 10-20 medium, 21-30 high and >30 very high impact, requiring gradually more interventions.     Current recorded COPD Assessment Tool (CAT) score of Cough Assessment: 4  Phlegm Assessment: 3  Chest tightness: 4  Walking on an incline: 4  Home Activities: 3  Confident Leaving The Home: 3  Sleeping Soundly: 2  Have Energy: 3  Assessment Score: 26       None

## 2024-08-15 NOTE — NURSE NAVIGATOR
Heart Failure Nurse Navigator: Heart Failure Education    RN performs hand hygiene. RN Introduces herself to the patient/family member and informs the patient/family member of the reasoning for the visit.    Patient Verbalizes Understanding for visit, is accepting of discussion    Persons present for Education: Patient/Family Member    Time Spent: At least 60minutes    Method of teaching:  Teach-back, demonstration, verbal, visual    Education Packets Given: Heart Failure symptom management calendar/tracker, AHA HF education booklet, HF magnet    -Confirmation of working scales & that the patient can read numbers  -Confirmation of support to assist with daily weights if patient unable to perform independently.     RN-NN provided education on Daily weights to include same time daily, on same scale, and documenting weights on calendar. RN-NN provided education trigger management/ signs and symptoms of Heart Failure. Patient is advised on “Yellow Days” to call MD office and on “Red Days” to come to the ER or call 911.   RN provides Nutritional education that includes how to calculate and restrict sodium and fluid intake. Encouraged fresh vegetable choice over canned/processed goods. Demonstrated how to log meals/intake. RN discusses lifestyle changes including cessation of smoking and increasing activity level.   In addition, RN discusses the importance of keeping/scheduling appointments and adherence to guideline directed medical therapies.      Patient/family member was able to teach back to the RN-NN the above information.  Patient/family member will need reinforcement of education provided.  Patient advised about the HF helper Deb.

## 2024-08-15 NOTE — CONSULTS
Consult Note            Date:8/15/2024        Patient Name:Shantanu Casillas     YOB: 1955     Age:68 y.o.    Consults Infectious Disease    Chief Complaint     Chief Complaint   Patient presents with    Shortness of Breath     Since last night patient is having SOB, Patient was DX with COVID a week ago. Patient  also is on ATB for UTI. Here for further evaluation.       Covid-19, VRE    History Obtained From   patient    History of Present Illness   This is a 68 year old male seen by Dr. Kolb last month for infected venous stasis wounds on both legs with MRSA and Enterobacter cloacae and was treated with Doxycycline. HE presented to the ED today because of  SOB after reportedly testing positive for Covid-19 on 7/31/24.  Also with increased leg edema. He was afebrile.  On exam, he had bilateral leg edema.  WBC was normal. Pro-BNP was markedly elevated.  Covid-19 was again detected.  Patient has been started on Azithromycin and Decadron.  Also started on Linezolid as continuation treatment for report UTI secondary to VRE.   ID has been consulted for this reason.      Patient seen in the ED where he is somnolent but responsive.  States that he tested positive the same after leaving here in July because he was \"feeling bad\".  He tested positive for Covid-19 at the [facility] ED and then reportedly hospitalized at [facility] for 4 days.  He does not recall specifically receiving Covid-19 treatment, e.g. Paxlovid.  He was then apparently discharged to [facility].  Past Medical History     Past Medical History:   Diagnosis Date    Arthritis     CAD (coronary artery disease)     Chronic obstructive pulmonary disease (HCC)     Diabetes (HCC)     Gout     Hypertension     Ill-defined condition     Sleep apnea     cpap        Past Surgical History     Past Surgical History:   Procedure Laterality Date    FOOT DEBRIDEMENT Right 12/15/2023    INCISION AND DRAINAGE RIGHT FIRST METATARSAL PHALANGEAL JOINT  limited benefit.  His hypoxia is multifactorial but reasonable to deploy Decadron and Azithromycin.  Unable to corroborate VRE UTI at this point.     Plan   1. Agree with Azithromycin and Decadron  2. Check CRP, procal, LDH and ferritin  3. Continue Linezolid for now    Electronically signed by Jordan Martin MD on 8/15/24 at 5:45 PM EDT

## 2024-08-16 LAB
ALBUMIN SERPL-MCNC: 2.1 G/DL (ref 3.5–5)
ALBUMIN SERPL-MCNC: 2.4 G/DL (ref 3.5–5)
ALBUMIN/GLOB SERPL: 0.4 (ref 1.1–2.2)
ALBUMIN/GLOB SERPL: 0.5 (ref 1.1–2.2)
ALP SERPL-CCNC: 108 U/L (ref 45–117)
ALP SERPL-CCNC: 119 U/L (ref 45–117)
ALT SERPL-CCNC: 24 U/L (ref 12–78)
ALT SERPL-CCNC: 25 U/L (ref 12–78)
ANION GAP SERPL CALC-SCNC: 8 MMOL/L (ref 5–15)
ANION GAP SERPL CALC-SCNC: 8 MMOL/L (ref 5–15)
APPEARANCE UR: CLEAR
AST SERPL W P-5'-P-CCNC: 28 U/L (ref 15–37)
AST SERPL W P-5'-P-CCNC: 36 U/L (ref 15–37)
BACTERIA URNS QL MICRO: NEGATIVE /HPF
BASOPHILS # BLD: 0 K/UL (ref 0–0.1)
BASOPHILS NFR BLD: 0 % (ref 0–1)
BILIRUB SERPL-MCNC: 0.4 MG/DL (ref 0.2–1)
BILIRUB SERPL-MCNC: 0.4 MG/DL (ref 0.2–1)
BILIRUB UR QL: NEGATIVE
BNP SERPL-MCNC: 9309 PG/ML
BUN SERPL-MCNC: 67 MG/DL (ref 6–20)
BUN SERPL-MCNC: 71 MG/DL (ref 6–20)
BUN/CREAT SERPL: 20 (ref 12–20)
BUN/CREAT SERPL: 20 (ref 12–20)
CA-I BLD-MCNC: 8.5 MG/DL (ref 8.5–10.1)
CA-I BLD-MCNC: 8.6 MG/DL (ref 8.5–10.1)
CHLORIDE SERPL-SCNC: 101 MMOL/L (ref 97–108)
CHLORIDE SERPL-SCNC: 104 MMOL/L (ref 97–108)
CO2 SERPL-SCNC: 23 MMOL/L (ref 21–32)
CO2 SERPL-SCNC: 29 MMOL/L (ref 21–32)
COLOR UR: ABNORMAL
CREAT SERPL-MCNC: 3.38 MG/DL (ref 0.7–1.3)
CREAT SERPL-MCNC: 3.53 MG/DL (ref 0.7–1.3)
CREAT UR-MCNC: 106 MG/DL
CRP SERPL-MCNC: 22.1 MG/DL (ref 0–0.3)
D DIMER PPP FEU-MCNC: 2.97 UG/ML(FEU)
DIFFERENTIAL METHOD BLD: ABNORMAL
EKG ATRIAL RATE: 109 BPM
EKG DIAGNOSIS: NORMAL
EKG P AXIS: 67 DEGREES
EKG P-R INTERVAL: 178 MS
EKG Q-T INTERVAL: 364 MS
EKG QRS DURATION: 114 MS
EKG QTC CALCULATION (BAZETT): 490 MS
EKG R AXIS: 123 DEGREES
EKG T AXIS: -20 DEGREES
EKG VENTRICULAR RATE: 109 BPM
EOSINOPHIL # BLD: 0 K/UL (ref 0–0.4)
EOSINOPHIL NFR BLD: 0 % (ref 0–7)
EPITH CASTS URNS QL MICRO: ABNORMAL /LPF
ERYTHROCYTE [DISTWIDTH] IN BLOOD BY AUTOMATED COUNT: 19.4 % (ref 11.5–14.5)
ERYTHROCYTE [DISTWIDTH] IN BLOOD BY AUTOMATED COUNT: 19.7 % (ref 11.5–14.5)
GLOBULIN SER CALC-MCNC: 4.8 G/DL (ref 2–4)
GLOBULIN SER CALC-MCNC: 5.3 G/DL (ref 2–4)
GLUCOSE BLD STRIP.AUTO-MCNC: 247 MG/DL (ref 65–100)
GLUCOSE BLD STRIP.AUTO-MCNC: 248 MG/DL (ref 65–100)
GLUCOSE BLD STRIP.AUTO-MCNC: 274 MG/DL (ref 65–100)
GLUCOSE BLD STRIP.AUTO-MCNC: 342 MG/DL (ref 65–100)
GLUCOSE SERPL-MCNC: 274 MG/DL (ref 65–100)
GLUCOSE SERPL-MCNC: 279 MG/DL (ref 65–100)
GLUCOSE UR STRIP.AUTO-MCNC: NEGATIVE MG/DL
HCT VFR BLD AUTO: 28.4 % (ref 36.6–50.3)
HCT VFR BLD AUTO: 30 % (ref 36.6–50.3)
HGB BLD-MCNC: 8.9 G/DL (ref 12.1–17)
HGB BLD-MCNC: 9.3 G/DL (ref 12.1–17)
HGB UR QL STRIP: NEGATIVE
IMM GRANULOCYTES # BLD AUTO: 0.1 K/UL (ref 0–0.04)
IMM GRANULOCYTES NFR BLD AUTO: 1 % (ref 0–0.5)
KETONES UR QL STRIP.AUTO: NEGATIVE MG/DL
LDH SERPL L TO P-CCNC: 241 U/L (ref 85–241)
LEUKOCYTE ESTERASE UR QL STRIP.AUTO: ABNORMAL
LYMPHOCYTES # BLD: 0.6 K/UL (ref 0.8–3.5)
LYMPHOCYTES NFR BLD: 13 % (ref 12–49)
MCH RBC QN AUTO: 29.5 PG (ref 26–34)
MCH RBC QN AUTO: 29.8 PG (ref 26–34)
MCHC RBC AUTO-ENTMCNC: 31 G/DL (ref 30–36.5)
MCHC RBC AUTO-ENTMCNC: 31.3 G/DL (ref 30–36.5)
MCV RBC AUTO: 95 FL (ref 80–99)
MCV RBC AUTO: 95.2 FL (ref 80–99)
MONOCYTES # BLD: 0.1 K/UL (ref 0–1)
MONOCYTES NFR BLD: 2 % (ref 5–13)
MUCOUS THREADS URNS QL MICRO: ABNORMAL /LPF
NEUTS SEG # BLD: 4 K/UL (ref 1.8–8)
NEUTS SEG NFR BLD: 84 % (ref 32–75)
NITRITE UR QL STRIP.AUTO: NEGATIVE
NRBC # BLD: 0.08 K/UL (ref 0–0.01)
NRBC # BLD: 0.08 K/UL (ref 0–0.01)
NRBC BLD-RTO: 1.3 PER 100 WBC
NRBC BLD-RTO: 1.7 PER 100 WBC
PERFORMED BY:: ABNORMAL
PH UR STRIP: 5 (ref 5–8)
PHOSPHATE SERPL-MCNC: 4.4 MG/DL (ref 2.6–4.7)
PLATELET # BLD AUTO: 217 K/UL (ref 150–400)
PLATELET # BLD AUTO: 226 K/UL (ref 150–400)
PMV BLD AUTO: 9.6 FL (ref 8.9–12.9)
PMV BLD AUTO: 9.6 FL (ref 8.9–12.9)
POTASSIUM SERPL-SCNC: 4.9 MMOL/L (ref 3.5–5.1)
POTASSIUM SERPL-SCNC: 5.4 MMOL/L (ref 3.5–5.1)
PROCALCITONIN SERPL-MCNC: 0.42 NG/ML
PROT SERPL-MCNC: 7.2 G/DL (ref 6.4–8.2)
PROT SERPL-MCNC: 7.4 G/DL (ref 6.4–8.2)
PROT UR STRIP-MCNC: NEGATIVE MG/DL
PROT UR-MCNC: 12 MG/DL (ref 0–11.9)
PROT/CREAT UR-RTO: 0.1
RBC # BLD AUTO: 2.99 M/UL (ref 4.1–5.7)
RBC # BLD AUTO: 3.15 M/UL (ref 4.1–5.7)
RBC #/AREA URNS HPF: ABNORMAL /HPF (ref 0–5)
SODIUM SERPL-SCNC: 135 MMOL/L (ref 136–145)
SODIUM SERPL-SCNC: 138 MMOL/L (ref 136–145)
SP GR UR REFRACTOMETRY: 1.01 (ref 1–1.03)
UROBILINOGEN UR QL STRIP.AUTO: 0.1 EU/DL (ref 0.1–1)
WBC # BLD AUTO: 4.8 K/UL (ref 4.1–11.1)
WBC # BLD AUTO: 6 K/UL (ref 4.1–11.1)
WBC URNS QL MICRO: ABNORMAL /HPF (ref 0–4)

## 2024-08-16 PROCEDURE — 94640 AIRWAY INHALATION TREATMENT: CPT

## 2024-08-16 PROCEDURE — 86140 C-REACTIVE PROTEIN: CPT

## 2024-08-16 PROCEDURE — 6360000002 HC RX W HCPCS: Performed by: NURSE PRACTITIONER

## 2024-08-16 PROCEDURE — 6370000000 HC RX 637 (ALT 250 FOR IP): Performed by: NURSE PRACTITIONER

## 2024-08-16 PROCEDURE — 85025 COMPLETE CBC W/AUTO DIFF WBC: CPT

## 2024-08-16 PROCEDURE — 2580000003 HC RX 258: Performed by: NURSE PRACTITIONER

## 2024-08-16 PROCEDURE — 82728 ASSAY OF FERRITIN: CPT

## 2024-08-16 PROCEDURE — 99232 SBSQ HOSP IP/OBS MODERATE 35: CPT | Performed by: INTERNAL MEDICINE

## 2024-08-16 PROCEDURE — 2060000000 HC ICU INTERMEDIATE R&B

## 2024-08-16 PROCEDURE — 81001 URINALYSIS AUTO W/SCOPE: CPT

## 2024-08-16 PROCEDURE — 2700000000 HC OXYGEN THERAPY PER DAY

## 2024-08-16 PROCEDURE — 6370000000 HC RX 637 (ALT 250 FOR IP): Performed by: INTERNAL MEDICINE

## 2024-08-16 PROCEDURE — 36415 COLL VENOUS BLD VENIPUNCTURE: CPT

## 2024-08-16 PROCEDURE — 83880 ASSAY OF NATRIURETIC PEPTIDE: CPT

## 2024-08-16 PROCEDURE — 85027 COMPLETE CBC AUTOMATED: CPT

## 2024-08-16 PROCEDURE — 80053 COMPREHEN METABOLIC PANEL: CPT

## 2024-08-16 PROCEDURE — 84100 ASSAY OF PHOSPHORUS: CPT

## 2024-08-16 PROCEDURE — 6360000002 HC RX W HCPCS: Performed by: INTERNAL MEDICINE

## 2024-08-16 PROCEDURE — 85379 FIBRIN DEGRADATION QUANT: CPT

## 2024-08-16 PROCEDURE — 83615 LACTATE (LD) (LDH) ENZYME: CPT

## 2024-08-16 PROCEDURE — 94761 N-INVAS EAR/PLS OXIMETRY MLT: CPT

## 2024-08-16 PROCEDURE — 82962 GLUCOSE BLOOD TEST: CPT

## 2024-08-16 PROCEDURE — 84145 PROCALCITONIN (PCT): CPT

## 2024-08-16 RX ADMIN — LINEZOLID 600 MG: 600 TABLET, FILM COATED ORAL at 21:48

## 2024-08-16 RX ADMIN — NYSTATIN 500000 UNITS: 100000 SUSPENSION ORAL at 13:01

## 2024-08-16 RX ADMIN — OXYCODONE HYDROCHLORIDE 5 MG: 5 TABLET ORAL at 18:18

## 2024-08-16 RX ADMIN — GABAPENTIN 400 MG: 400 CAPSULE ORAL at 09:03

## 2024-08-16 RX ADMIN — ASPIRIN 81 MG CHEWABLE TABLET 81 MG: 81 TABLET CHEWABLE at 09:03

## 2024-08-16 RX ADMIN — SENNOSIDES 8.6 MG: 8.6 TABLET, FILM COATED ORAL at 09:03

## 2024-08-16 RX ADMIN — INSULIN GLARGINE 32 UNITS: 100 INJECTION, SOLUTION SUBCUTANEOUS at 21:47

## 2024-08-16 RX ADMIN — NYSTATIN 500000 UNITS: 100000 SUSPENSION ORAL at 21:47

## 2024-08-16 RX ADMIN — INSULIN LISPRO 8 UNITS: 100 INJECTION, SOLUTION INTRAVENOUS; SUBCUTANEOUS at 13:01

## 2024-08-16 RX ADMIN — Medication 1 MG: at 09:02

## 2024-08-16 RX ADMIN — ACETAMINOPHEN 650 MG: 325 TABLET ORAL at 12:20

## 2024-08-16 RX ADMIN — CALCIUM CARBONATE 500 MG: 500 TABLET, CHEWABLE ORAL at 09:03

## 2024-08-16 RX ADMIN — INSULIN LISPRO 4 UNITS: 100 INJECTION, SOLUTION INTRAVENOUS; SUBCUTANEOUS at 21:48

## 2024-08-16 RX ADMIN — OXYCODONE HYDROCHLORIDE AND ACETAMINOPHEN 1000 MG: 500 TABLET ORAL at 09:03

## 2024-08-16 RX ADMIN — Medication 2 PUFF: at 20:21

## 2024-08-16 RX ADMIN — INSULIN GLARGINE 80 UNITS: 100 INJECTION, SOLUTION SUBCUTANEOUS at 09:01

## 2024-08-16 RX ADMIN — SODIUM CHLORIDE, PRESERVATIVE FREE 10 ML: 5 INJECTION INTRAVENOUS at 09:08

## 2024-08-16 RX ADMIN — ATORVASTATIN CALCIUM 40 MG: 40 TABLET, FILM COATED ORAL at 21:49

## 2024-08-16 RX ADMIN — Medication 1 CAPSULE: at 09:02

## 2024-08-16 RX ADMIN — FLUTICASONE PROPIONATE 2 SPRAY: 50 SPRAY, METERED NASAL at 22:15

## 2024-08-16 RX ADMIN — Medication 2 PUFF: at 09:14

## 2024-08-16 RX ADMIN — Medication 400 MG: at 09:03

## 2024-08-16 RX ADMIN — OXYCODONE HYDROCHLORIDE AND ACETAMINOPHEN 1000 MG: 500 TABLET ORAL at 21:48

## 2024-08-16 RX ADMIN — SODIUM CHLORIDE, PRESERVATIVE FREE 10 ML: 5 INJECTION INTRAVENOUS at 21:51

## 2024-08-16 RX ADMIN — CETIRIZINE HYDROCHLORIDE 10 MG: 10 TABLET, FILM COATED ORAL at 21:48

## 2024-08-16 RX ADMIN — FERROUS SULFATE TAB 325 MG (65 MG ELEMENTAL FE) 325 MG: 325 (65 FE) TAB at 09:03

## 2024-08-16 RX ADMIN — SODIUM BICARBONATE 650 MG: 650 TABLET ORAL at 09:03

## 2024-08-16 RX ADMIN — AZITHROMYCIN DIHYDRATE 250 MG: 500 TABLET ORAL at 18:12

## 2024-08-16 RX ADMIN — OXYCODONE HYDROCHLORIDE 5 MG: 5 TABLET ORAL at 09:03

## 2024-08-16 RX ADMIN — FUROSEMIDE 60 MG: 10 INJECTION, SOLUTION INTRAMUSCULAR; INTRAVENOUS at 09:00

## 2024-08-16 RX ADMIN — PANTOPRAZOLE SODIUM 40 MG: 40 TABLET, DELAYED RELEASE ORAL at 09:03

## 2024-08-16 RX ADMIN — LINEZOLID 600 MG: 600 TABLET, FILM COATED ORAL at 09:03

## 2024-08-16 RX ADMIN — DEXAMETHASONE 6 MG: 4 TABLET ORAL at 09:02

## 2024-08-16 RX ADMIN — ZINC SULFATE 220 MG (50 MG) CAPSULE 50 MG: CAPSULE at 09:03

## 2024-08-16 RX ADMIN — INSULIN LISPRO 8 UNITS: 100 INJECTION, SOLUTION INTRAVENOUS; SUBCUTANEOUS at 18:13

## 2024-08-16 RX ADMIN — WHITE PETROLATUM,ZINC OXIDE: 57; 17 PASTE TOPICAL at 21:50

## 2024-08-16 RX ADMIN — NYSTATIN 500000 UNITS: 100000 SUSPENSION ORAL at 18:12

## 2024-08-16 RX ADMIN — FUROSEMIDE 60 MG: 10 INJECTION, SOLUTION INTRAMUSCULAR; INTRAVENOUS at 21:47

## 2024-08-16 RX ADMIN — NYSTATIN 500000 UNITS: 100000 SUSPENSION ORAL at 09:05

## 2024-08-16 RX ADMIN — Medication 400 UNITS: at 21:48

## 2024-08-16 RX ADMIN — FUROSEMIDE 60 MG: 10 INJECTION, SOLUTION INTRAMUSCULAR; INTRAVENOUS at 13:01

## 2024-08-16 RX ADMIN — Medication 400 UNITS: at 09:02

## 2024-08-16 RX ADMIN — FLUCONAZOLE 200 MG: 100 TABLET ORAL at 09:03

## 2024-08-16 RX ADMIN — CALCIUM CARBONATE 500 MG: 500 TABLET, CHEWABLE ORAL at 21:48

## 2024-08-16 RX ADMIN — ACETAMINOPHEN 650 MG: 325 TABLET ORAL at 22:15

## 2024-08-16 RX ADMIN — GABAPENTIN 400 MG: 400 CAPSULE ORAL at 21:48

## 2024-08-16 RX ADMIN — INSULIN LISPRO 8 UNITS: 100 INJECTION, SOLUTION INTRAVENOUS; SUBCUTANEOUS at 09:01

## 2024-08-16 RX ADMIN — Medication 3 MG: at 21:48

## 2024-08-16 RX ADMIN — TAMSULOSIN HYDROCHLORIDE 0.8 MG: 0.4 CAPSULE ORAL at 09:03

## 2024-08-16 ASSESSMENT — PAIN SCALES - GENERAL
PAINLEVEL_OUTOF10: 3
PAINLEVEL_OUTOF10: 3
PAINLEVEL_OUTOF10: 2
PAINLEVEL_OUTOF10: 6
PAINLEVEL_OUTOF10: 1
PAINLEVEL_OUTOF10: 0
PAINLEVEL_OUTOF10: 6
PAINLEVEL_OUTOF10: 8

## 2024-08-16 ASSESSMENT — PAIN DESCRIPTION - LOCATION
LOCATION: BACK
LOCATION: GENERALIZED
LOCATION: GENERALIZED

## 2024-08-16 ASSESSMENT — PAIN DESCRIPTION - DESCRIPTORS
DESCRIPTORS: ACHING

## 2024-08-16 NOTE — PROGRESS NOTES
History and Physical    NAME:   Shantanu Casillas   :  1955   MRN:  [id number]     Date/Time: 2024 12:32 PM    Patient PCP: Ed Pino MD  ______________________________________________________________________       Subjective:     CHIEF COMPLAINT:   Shortness of breath      HISTORY OF PRESENT ILLNESS:     General Daily Progress Note  Patient is a 68 y.o. year old male with past medical history of arthritis, coronary artery disease, COPD, diabetes, gout, hypertension, sleep apnea presents to the ED with dyspnea. Patient reports he was diagnosed with COVID 1 week prior, and has been increasingly short of breath since then. Also states he was taken off of his diuretic and has had increasing swelling in the legs and abdomen. He has a productive cough, states he feels like he cannot \"get it out \". No nausea or vomiting. No fevers at home.       HPI-patient alert and in moderate distress.  He endorses shortness of breath, coughing, nausea without vomiting.  He denies chest pain, palpitations, fever, headache, abdominal pain and diarrhea.    Vitals-blood pressure 127/84 pulse 98 temperature 97.5 respirations 18 O2 97%    Labs-potassium 5.4, BUN 67, creatinine 3.38, GFR 19, glucose 248, BNP 3528, ammonia 43, hemoglobin 8.9, hematocrit 28.4, MCV 95, pH 7.32, pCO2 49, D-dimer 2.97    COVID-19 detected on PCR    EKG demonstrates ventricular paced rhythm    Chest x-ray performed 8/15-cardiomegaly and mild pulmonary edema    Consulted nephrology 8/15    Consulted infectious disease 8/15 -  Check CRP, procal, LDH and ferritin  Continue Linezolid for now    Consulted cardiology -may require echocardiogram for further management as patient is clinically stable for me to expose other team member in the hospital to proceed with echocardiogram as it is not deemed necessary based on my clinical assessment. Optimize guideline directed medical management for cardiomyopathy      Past Medical History:  Bowel sounds are normal. There is no abdominal tenderness. There is no rebound and no guarding.   Musculoskeletal: Normal range of motion.   Neurological: pt is alert and oriented to person, place, and time. Alert. Normal strength. No cranial nerve deficit or sensory deficit. Displays a negative Romberg sign.        ASSESSMENT & PLAN number melatonin    COVID-19  Volume/fluid overload  Diabetes  COPD  Anemia  Hyperkalemia  Acute CHF  Respiratory acidosis with metabolic alkalosis  Acute kidney injury on chronic kidney disease stage III  Sleep apnea  Hypertension  Arthritis    Vitamin C 1000 mg oral twice daily  Aspirin 81 Mg oral daily  Lipitor 40 Mg oral nightly  Astelin 0.1% nasal spray 2 spray each nostril 2 times daily  Azithromycin 250 mg oral Daily  B complex vitamin capsule oral daily  Symbicort 80-4.5 inhalation 2 puff  Spiriva Respimat 2.5 mcg/ACT inhaler 2 puff daily  Tums 500 mg oral 2 times daily  Zyrtec 10 mg oral nightly  Decadron 6 Mg oral daily  Iron tablet 325 mg oral Daily  Diflucan 200 mg oral Daily  Flonase 50 mm CG/ACT nasal spray 2 spray each nostril daily  Lasix injection 60 mg intervenous 3 times daily  Neurontin 400 mg oral twice daily  Lantus injection 32 units subcutaneous nightly  Lantus injection 80 units subcutaneous daily  Lidocaine 4% external patch every 12 hours daily.  Zyvox 600 mg oral every 12 hours  Mag-ox 400 Mg oral daily  Melatonin 3 mg nightly  Nystatin 500,000 units oral 4 times daily  Protonix 40 Mg oral daily  SENOKOT 8.6 mg oral Daily  Flomax 0.8 Mg oral daily  Zinc sulfate 50 mg oral Daily    COVID-19 detected on PCR    EKG demonstrates ventricular paced rhythm    Chest x-ray performed 8/15-cardiomegaly and mild pulmonary edema    Consulted nephrology 8/15    Consulted infectious disease 8/15-  Check CRP, procal, LDH and ferritin  Continue Linezolid for now    Consulted cardiology 8/16-may require echocardiogram for further management as patient is clinically stable for  me to expose other team member in the hospital to proceed with echocardiogram as it is not deemed necessary based on my clinical assessment. Optimize guideline directed medical management for cardiomyopathy    ________________________________________________________________________    Signed: Ed Pino MD

## 2024-08-16 NOTE — PROGRESS NOTES
Nephrology follow up          Patient: Shantanu Casillas MRN: [id number]  SSN: [id number]    YOB: 1955  Age: 68 y.o.  Sex: male      Subjective:   The pt is seen in the room.  Blood pressures are good  Afebrile  On nasal cannula 3 L  Resolving lower extremity swelling  Resolving DEMARCO  Potassium 5.4  Past Medical History:   Diagnosis Date    Arthritis     CAD (coronary artery disease)     Chronic obstructive pulmonary disease (HCC)     Diabetes (HCC)     Gout     Hypertension     Ill-defined condition     Sleep apnea     cpap     Past Surgical History:   Procedure Laterality Date    FOOT DEBRIDEMENT Right 12/15/2023    INCISION AND DRAINAGE RIGHT FIRST METATARSAL PHALANGEAL JOINT performed by Polo Oviedo DPM at [facility] MAIN OR    FOOT SURGERY      right heel - foriegn object    HX VEIN ABLATION ADHESIVE      ORTHOPEDIC SURGERY      back surgery    PACEMAKER      aicd -      Family History   Problem Relation Age of Onset    Diabetes Mother     Heart Disease Father     Hypertension Father     Diabetes Sister     Diabetes Brother     Hypertension Mother     Heart Disease Mother     Kidney Disease Mother      Social History     Tobacco Use    Smoking status: Never    Smokeless tobacco: Never   Substance Use Topics    Alcohol use: Not Currently      Current Facility-Administered Medications   Medication Dose Route Frequency Provider Last Rate Last Admin    Petrolatum-Zinc Oxide ointment   Topical BID Ed Pino MD        sodium chloride flush 0.9 % injection 5-40 mL  5-40 mL IntraVENous 2 times per day Kitty Phelps APRN - CNP   10 mL at 08/16/24 0908    sodium chloride flush 0.9 % injection 5-40 mL  5-40 mL IntraVENous PRN Kitty Phelps APRN - CNP        0.9 % sodium chloride infusion   IntraVENous PRN Kitty Phelps APRN - CNP        potassium chloride (KLOR-CON M) extended release tablet 40 mEq  40 mEq Oral PRN Kitty Phelps APRN - CNP        Or    potassium bicarb-citric  500,000 Units  500,000 Units Oral 4x Daily PC & HS Kitty Phelps APRN - CNP   500,000 Units at 08/16/24 1301    pantoprazole (PROTONIX) tablet 40 mg  40 mg Oral QAM AC Kitty Phelps APRN - CNP   40 mg at 08/16/24 0903    potassium chloride (KLOR-CON) 20 MEQ packet 40 mEq  40 mEq Oral BID Kitty Phelps APRN - CNP        senna (SENOKOT) tablet 8.6 mg  1 tablet Oral Daily Kitty Phelps APRN - CNP   8.6 mg at 08/16/24 0903    simethicone (MYLICON) chewable tablet 80 mg  80 mg Oral Q6H PRN Kitty Phelps APRN - CNP        sodium bicarbonate tablet 650 mg  650 mg Oral BID Kitty Phelps APRN - CNP   650 mg at 08/16/24 0903    Sodium Hypochlorite (DAKINS) 0.25 % half strength external soln   Irrigation Daily Kitty Phelps APRN - CNP        sorbitol 70 % liquid 30 mL  30 mL Oral Daily PRN Kitty Phelps APRN - CNP        tamsulosin (FLOMAX) capsule 0.8 mg  0.8 mg Oral Daily Kitty Phelps APRN - CNP   0.8 mg at 08/16/24 0903    vitamin E capsule 400 Units  400 Units Oral BID Kitty Phelps APRN - CNP   400 Units at 08/16/24 0902    zinc sulfate (ZINCATE) 220 mg capsule - elemental zinc 50 mg  50 mg Oral Daily Kitty Phelps APRN - CNP   50 mg at 08/16/24 0903    dexAMETHasone (DECADRON) tablet 6 mg  6 mg Oral Daily Kitty Phelps APRN - CNP   6 mg at 08/16/24 0902    azithromycin (ZITHROMAX) tablet 250 mg  250 mg Oral Daily Kitty Phelps APRN - CNP        gabapentin (NEURONTIN) capsule 400 mg  400 mg Oral BID Bhupendra Hayden MD   400 mg at 08/16/24 0903    insulin glargine (LANTUS) injection vial 32 Units  32 Units SubCUTAneous Nightly Kitty Phelps APRN - CNP   32 Units at 08/15/24 2313    insulin glargine (LANTUS) injection vial 80 Units  80 Units SubCUTAneous Daily Kitty Phelps APRN - CNP   80 Units at 08/16/24 0901    insulin lispro (HUMALOG,ADMELOG) injection vial 0-16 Units  0-16 Units SubCUTAneous TID  Kitty Phelps APRN - CNP   8 Units at  08/16/24 1301    insulin lispro (HUMALOG,ADMELOG) injection vial 0-4 Units  0-4 Units SubCUTAneous Nightly Kitty Phelps APRN - CNP        oxyCODONE (ROXICODONE) immediate release tablet 5 mg  5 mg Oral Q8H PRN Kitty Phelps APRN - CNP   5 mg at 08/16/24 0903    azelastine (ASTELIN) 0.1 % nasal spray 2 spray  2 spray Each Nostril BID Ed Pino MD            Allergies   Allergen Reactions    Amitriptyline Angioedema    Naproxen      Other reaction(s): Unknown (comments)  Pt not sure of reaction    Sulfa Antibiotics Nausea And Vomiting       Review of Systems:  A comprehensive review of systems was negative except for that written in the History of Present Illness.    Objective:     Vitals:    08/16/24 0848 08/16/24 0908 08/16/24 0933 08/16/24 1228   BP: 127/84   139/86   Pulse: 90 92  88   Resp: 18 21 19 20   Temp: 97.5 °F (36.4 °C)   97.7 °F (36.5 °C)   TempSrc: Axillary   Oral   SpO2: 97% 95%  97%   Weight:       Height:            Physical Exam:  General: NAD  Eyes: sclera anicteric  Oral Cavity: No thrush or ulcers  Neck: no JVD  Chest: Fair bilateral air entry  Heart: normal sounds  Abdomen: soft and non tender   : no juares  Lower Extremities: edema++  Skin: no rash  Neuro: intact  Psychiatric: non-depressed          Assessment/Plan:   Acute kidney injury on chronic kidney disease stage III  2/2 prerenal from cardiorenal syndrome  BUN/creatinine 67/3.66  Resolving DEMARCO and BUN/creatinine 67/3.38.  Last creatinine 1.8 on 8/6/2024.  Difficult to determine exact baseline creatinine due to multiple recent DEMARCO's.  Volume up and hypotensive  Sending UA and UPCR  Has external catheter and voiding without any problem  Aggressive IV diuresis    Hyperkalemia  Due to acute kidney injury  Potassium 5.8  Medically treated  Potassium 5.4 today  P.o. Lokelma 10 g 1 dose  Low potassium diet  IV diuresis will help lowering K    Acute CHF  Due to LV systolic and diastolic dysfunction  LVEF 40 to 45%  SOB and  increasing swelling of extremities  Chest x-ray pulmonary edema  Aggressive IV diuresis    Metabolic alkalosis  Due to renal compensation in the setting of chronic respiratory acidosis  CO2 27  I will discontinue oral sodium bicarbonate.    Hypertension  Good blood pressure.  On no Bp meds.    Anemia  Anemia of chronic kidney disease  Iron deficiency  Hemoglobin 8.5  Subcu erythropoietin weekly  Continue oral iron sulfate        Plan:       Signed By: Bhupendra Hayden MD     August 16, 2024

## 2024-08-16 NOTE — CARE COORDINATION
Message received from [facility] IRF, \"Mr. Casillas can return before 8/17 at 11:59pm without a reassessment. If he's admitted longer than that, we'll have to reassess and get a new approval...we'll be following along!\"    Family unsure if they want patient to return to IRF or return home with HH, Accepted with [facility], they are awaiting PT/OT recc to determine final DCP, CM notified charge nurse of need for PT/OT orders in IDR's.    CM continues to follow and monitor for needs.

## 2024-08-16 NOTE — DISCHARGE INSTR - OTHER ORDERS
- Daily weights in the morning before eating or drinking but after using the bathroom.  - Monitor for heart failure symptoms- Call Cardiologist or Nephrologist when noticing an increase/worsening or new symptoms such as:  Lower extremity swelling, Abdominal bloating, Shortness of breath, Fatigue, Confusion, Muscle Weakness, Difficulty laying flat, Cough, and/or Chest Discomfort. This also includes the weight gain of 2-3lbs in 24hrs or 3-5lbs in one week.  (It only takes one symptom to report to the doctors). -  Dr. Ethan Barker at [facility] Cardiology or Nephrology Dr. Bhupendra Hayden at [facility].    - Administer Wound Care    - Asisst with PT/OT

## 2024-08-16 NOTE — DISCHARGE INSTR - DIET
No salt added, 2000mg sodium restriction, Low fat, High fiber, Low carb, Heart Healthy, Diabetic Diet.  1200mL Fluid Restriction

## 2024-08-16 NOTE — PROGRESS NOTES
Infectious Disease Progress Note           Subjective:   68-y.o WM admitted on 08/15 with dyspnea believed to be from volume overload. He is familiar to me from prior admissions [facility].  He tested positive for COVID-19 2 wks ago for which she was admitted to [facility], and [facility].  VRE was reportedly recently isolated from his urine Cx for which he is on linezolid. He reported feeling much better during my assessment   Objective:   Physical Exam:     /79   Pulse 95   Temp 97.6 °F (36.4 °C) (Oral)   Resp 20   Ht 1.803 m (5' 11\")   Wt (!) 165.3 kg (364 lb 6.7 oz)   SpO2 97%   BMI 50.83 kg/m²  O2 Flow Rate (L/min): 3 L/min O2 Device: Nasal cannula    Temp (24hrs), Av.7 °F (36.5 °C), Min:97.5 °F (36.4 °C), Max:97.9 °F (36.6 °C)    701 - 0  In: -   Out: 500 [Urine:500]   1901 -  0700  In: 200 [P.O.:200]  Out: 900 [Urine:900]    General: NAD, AAO x 4  HEENT: REBEKAH, Moist mucosa   Lungs: CTA b/l, decreased at the bases, no wheeze/rhonchi   Heart: S1S2+, RRR, no murmur  Abdo: Soft, NT, ND, +BS   : no chronic juares cath   Exts: decreased b/l leg swelling, compression dressing in place   Skin: b/l leg dressings in place, not examined     Data Review:       Recent Days:    Recent Labs     08/15/24  1326 24  1059 24  1405   WBC 6.7 4.8 6.0   HGB 8.5* 8.9* 9.3*   HCT 27.2* 28.4* 30.0*    217 226     Recent Labs     08/15/24  1326 24  0104 24  1405   BUN 67* 67* 71*   CREATININE 3.66* 3.38* 3.53*       Lab Results   Component Value Date/Time    CRP 22.10 2024 10:59 AM          Microbiology     Results       Procedure Component Value Units Date/Time    COVID-19, Rapid [[id number]]  (Abnormal) Collected: 08/15/24 1523    Order Status: Completed Specimen: Nasopharyngeal Updated: 08/15/24 1700     Source Swab        SARS-CoV-2, PCR DETECTED        Comment:   Positive results are indicative  of the presence of SARS-CoV-2. Clinical correlation with patient history and other diagnostic information is necessary to determine patient infection status. Positive results do not rule out bacterial infection or co-infection with other pathogens.      Testing was performed using osito Della SARS-CoV-2 assay. This test is a multiplex Real-Time Reverse Transcriptase Polymerase Chain Reaction (RT-PCR) based in vitro diagnostic test intended for the qualitative detection of nucleic acids from SARS-CoV-2 in nasopharyngeal and nasal swab specimens,for use under the FDA's Emergency Use Authorization (EUA) only.     Fact sheet for Patients: https://www.fda.gov/media/987865/download  Fact sheet for Healthcare Providers: https://www.fda.gov/media/494088/download CALLED TO AND READ BACK BY Cami [email or web address] [email or web address]         Rapid Strep Screen [[id number]] Collected: 08/12/24 2130    Order Status: Completed Specimen: Blood Serum Updated: 08/13/24 0034     Strep A Ag Negative       Culture, Throat [[id number]] Collected: 08/12/24 2130    Order Status: Completed Specimen: Throat Updated: 08/15/24 0717     Special Requests No Special Requests        Culture       Normal respiratory ady/no beta strep isolated                   Diagnostic   XR CHEST PORTABLE    Result Date: 8/15/2024  Cardiomegaly and mild pulmonary edema. Electronically signed by Nasir Sebastian      Assessment/Plan     Acute hypoxic respiratory failure likely multifactorial etiology: COVID-19 infection, COPD and CHF exacerbation        Reported worsening dyspnea on presentation, tested positive for COVID 19 2 wks ago, repeat test on 08/15 positive         Cardiomegaly and mild pulmonary edema on admission CXR        Clear lungs on exam, maintain O2 sats on  3 L, denies productive cough         WBC WNLs, Ferritin is pending, , and CRP 22.10        Continue on azithromycin and Decadron. Routine labs in AM, repeat CXR     2. Chronic stasis ulcers involving

## 2024-08-16 NOTE — PLAN OF CARE
Problem: Cardiovascular - Adult  Goal: Absence of cardiac dysrhythmias or at baseline  8/16/2024 0039 by Mari Kelley RN  Outcome: Progressing  8/16/2024 0037 by Mari Kelley RN  Outcome: Progressing     Problem: Skin/Tissue Integrity - Adult  Goal: Incisions, wounds, or drain sites healing without S/S of infection  8/16/2024 0039 by Mari Kelley RN  Outcome: Progressing  8/16/2024 0037 by Mari Kelley RN  Outcome: Progressing     Problem: Infection - Adult  Goal: Absence of infection at discharge  8/16/2024 0039 by Mari Kelley RN  Outcome: Progressing  8/16/2024 0037 by Mari Kelley RN  Outcome: Progressing  Goal: Absence of infection during hospitalization  8/16/2024 0039 by Mari Kelley RN  Outcome: Progressing  8/16/2024 0037 by Mari Kelley RN  Outcome: Progressing  Goal: Absence of fever/infection during anticipated neutropenic period  8/16/2024 0039 by Mari Kelley RN  Outcome: Progressing  8/16/2024 0037 by Mari Kelley RN  Outcome: Progressing     Problem: Metabolic/Fluid and Electrolytes - Adult  Goal: Electrolytes maintained within normal limits  8/16/2024 0039 by Mari Kelley RN  Outcome: Progressing  8/16/2024 0037 by Mari Kelley RN  Outcome: Progressing  Goal: Hemodynamic stability and optimal renal function maintained  8/16/2024 0039 by Mari Kelley RN  Outcome: Progressing  8/16/2024 0037 by Mari Kelley RN  Outcome: Progressing

## 2024-08-16 NOTE — CONSULTS
Cardiology Consult    NAME: Shantanu Casillas   :  1955   MRN:  [id number]     Date/Time:  2024 10:45 AM    Patient PCP: Ed Pino MD  ________________________________________________________________________    Problem List  -Admitted to the hospital with shortness of breath  -COVID-19 infection.  -COPD  -History of coronary artery disease details unavailable at this time  -History of congestive heart failure with last known ejection fraction of 40 to 45% as of   -Sleep apnea  -Hypertension  -Diabetes mellitus  -Arthritis           Assessment and Plan:   Note dictated 2024  -68-year-old white male with history of cardiomyopathy the details of which is not known to me at this time admitted to the hospital with shortness of breath and found to have COVID-19 infection.  -Patient has history of COPD hypertension diabetes mellitus arthritis and sleep apnea with no recent cardiac workup being done per my communication directly with him.  -Cardiac enzyme has been unremarkable and EKG shows no acute changes.  -Telemetry reveals sinus rhythm.  -Will check serial cardiac enzymes continue to monitor him on telemetry and once cleared from ID may require echocardiogram for further management as patient is clinically stable for me to expose other team member in the hospital to proceed with echocardiogram as it is not deemed necessary based on my clinical assessment  -Will follow while patient is in the hospital along with the team with conservative approach.  -Optimize guideline directed medical management for cardiomyopathy    Thank you for the consultation and letting me participate in the care of our mutual patient.          []        High complexity decision making was performed        Subjective:   CHIEF COMPLAINT: Shortness of breath      REASON FOR CONSULT: Shortness of breath history of congestive heart failure and cardiomyopathy.      HISTORY OF PRESENT ILLNESS:     Shantanu RIOS  Disease Mother        REVIEW OF SYSTEMS:     []         Unable to obtain  ROS due to ---   [x]         Total of 12 systems reviewed as follows:    Constitutional: negative fever, negative chills, negative weight loss  Eyes:   negative visual changes  ENT:   negative sore throat, tongue or lip swelling  Respiratory:  Shortness of breath  Cards:  negative for chest pain, palpitations, lower extremity edema  GI:   negative for nausea, vomiting, diarrhea, and abdominal pain  Genitourinary: negative for frequency, dysuria  Integument:  negative for rash   Hematologic:  negative for easy bruising and gum/nose bleeding  Musculoskel: negative for myalgias,  back pain  Neurological:  negative for headaches, dizziness, vertigo, weakness  Behavl/Psych: negative for feelings of anxiety, depression     Pertinent Positives include :    Objective:      Physical Exam:    Last 24hrs VS reviewed since prior progress note. Most recent are:    /84   Pulse 92   Temp 97.5 °F (36.4 °C) (Axillary)   Resp 19   Ht 1.803 m (5' 11\")   Wt (!) 165.3 kg (364 lb 6.7 oz)   SpO2 95%   BMI 50.83 kg/m²     Intake/Output Summary (Last 24 hours) at 8/16/2024 1045  Last data filed at 8/16/2024 0650  Gross per 24 hour   Intake 200 ml   Output 900 ml   Net -700 ml        General Appearance: Well developed, alert, no acute distress.  Ears/Nose/Mouth/Throat: Moist mucosa  Neck: Supple.  JVP within normal limits. Carotids good upstrokes  Chest: Lungs clear to auscultation bilaterally.  Cardiovascular: Regular rate and rhythm, S1S2 normal, soft systolic murmur  Abdomen: Soft, non-tender, bowel sounds are active.   Extremities: No edema bilaterally. distal pulses +1.  Skin: Warm and dry.  Neuro: Alert and oriented x3, normal speech; follows simple commands  Psychiatric: Cooperative; appropriate    []         Post-cath site without hematoma, bruit, tenderness, or thrill.  Distal pulses intact.    Data:      Telemetry: Sinus rhythm    EKG: No acute  changes for significant ischemia or injury pattern      No valid procedures specified.    Prior to Admission medications    Medication Sig Start Date End Date Taking? Authorizing Provider   azelastine (ASTELIN) 0.1 % nasal spray 2 sprays by Nasal route 2 times daily Use in each nostril as directed   Yes Doretha Mccarthy MD   benzocaine-menthol (CEPACOL SORE THROAT) 15-3.6 MG lozenge Take 1 lozenge by mouth every 2 hours as needed for Sore Throat   Yes Doretha Mccarthy MD   calcium carbonate (TUMS) 500 MG chewable tablet Take 1 tablet by mouth 2 times daily   Yes Doretha Mccarthy MD   cetirizine (ZYRTEC) 10 MG tablet Take 1 tablet by mouth daily   Yes Doretha Mccarthy MD   clonazePAM (KLONOPIN) 0.5 MG tablet Take 0.5 tablets by mouth 2 times daily as needed for Anxiety.   Yes Doretha Mccarthy MD   diclofenac sodium (VOLTAREN) 1 % GEL Apply 4 g topically 4 times daily as needed for Pain   Yes Doretha Mccarthy MD   fluconazole (DIFLUCAN) 100 MG tablet Take 2 tablets by mouth daily For 14 days stop on 8/27/24   Yes Doretha Mccarthy MD   fluticasone (FLONASE) 50 MCG/ACT nasal spray 2 sprays by Each Nostril route daily   Yes Doretha Mccarthy MD   guaiFENesin (ROBITUSSIN) 100 MG/5ML syrup Take 10 mLs by mouth 4 times daily as needed for Cough   Yes Doretha Mccarthy MD   heparin, porcine, 5000 UNIT/ML injection Inject 1 mL into the skin every 8 hours   Yes Doretha Mccarthy MD   hydrALAZINE (APRESOLINE) 25 MG tablet Take 1 tablet by mouth every 6 hours as needed (elevated blood pressure)   Yes Doretha Mccarthy MD   hydrOXYzine HCl (ATARAX) 10 MG tablet Take 1 tablet by mouth every 6 hours as needed for Anxiety   Yes Doretha Mccarthy MD   insulin regular (HUMULIN R;NOVOLIN R) 100 UNIT/ML injection Inject into the skin See Admin Instructions Sliding scale   Yes Doretha Mccarthy MD   ipratropium (ATROVENT HFA) 17 MCG/ACT inhaler Inhale 2 puffs into the lungs every  (VITAMIN B COMPLEX) TABS Take 1 tablet by mouth daily   Yes Provider, MD Doretha   flunisolide (NASALIDE) 25 MCG/ACT (0.025%) SOLN 2 sprays 2 times daily 9/9/22  Yes Provider, MD Doretha   fluticasone-umeclidin-vilant (TRELEGY ELLIPTA) 100-62.5-25 MCG/ACT AEPB inhaler INHALE 1 INHALATION BY MOUTH ONCE DAILY 1/4/22  Yes Provider, MD Doretha   vitamin E 400 UNIT capsule Take 1 capsule by mouth 2 times daily 6/8/21  Yes Provider, MD Doretha       Recent Results (from the past 24 hour(s))   CBC with Auto Differential    Collection Time: 08/15/24  1:26 PM   Result Value Ref Range    WBC 6.7 4.1 - 11.1 K/uL    RBC 2.84 (L) 4.10 - 5.70 M/uL    Hemoglobin 8.5 (L) 12.1 - 17.0 g/dL    Hematocrit 27.2 (L) 36.6 - 50.3 %    MCV 95.8 80.0 - 99.0 FL    MCH 29.9 26.0 - 34.0 PG    MCHC 31.3 30.0 - 36.5 g/dL    RDW 20.1 (H) 11.5 - 14.5 %    Platelets 252 150 - 400 K/uL    MPV 10.1 8.9 - 12.9 FL    Nucleated RBCs 0.4 (H) 0.0  WBC    nRBC 0.03 (H) 0.00 - 0.01 K/uL    Neutrophils % 67 32 - 75 %    Lymphocytes % 17 12 - 49 %    Monocytes % 11 5 - 13 %    Eosinophils % 4 0 - 7 %    Basophils % 0 0 - 1 %    Immature Granulocytes % 1 (H) 0 - 0.5 %    Neutrophils Absolute 4.4 1.8 - 8.0 K/UL    Lymphocytes Absolute 1.2 0.8 - 3.5 K/UL    Monocytes Absolute 0.8 0.0 - 1.0 K/UL    Eosinophils Absolute 0.3 0.0 - 0.4 K/UL    Basophils Absolute 0.0 0.0 - 0.1 K/UL    Immature Granulocytes Absolute 0.1 (H) 0.00 - 0.04 K/UL    Differential Type AUTOMATED     Comprehensive Metabolic Panel    Collection Time: 08/15/24  1:26 PM   Result Value Ref Range    Sodium 138 136 - 145 mmol/L    Potassium 5.1 3.5 - 5.1 mmol/L    Chloride 104 97 - 108 mmol/L    CO2 27 21 - 32 mmol/L    Anion Gap 7 5 - 15 mmol/L    Glucose 133 (H) 65 - 100 mg/dL    BUN 67 (H) 6 - 20 mg/dL    Creatinine 3.66 (H) 0.70 - 1.30 mg/dL    BUN/Creatinine Ratio 18 12 - 20      Est, Glom Filt Rate 17 (L) >60 ml/min/1.73m2    Calcium 8.6 8.5 - 10.1 mg/dL    Total

## 2024-08-16 NOTE — PROGRESS NOTES
IP WOUND CONSULT    hSantanu Casillas  MEDICAL RECORD NUMBER:  [id number]  AGE: 68 y.o.   GENDER: male  : 1955  TODAY'S DATE:  2024    GENERAL     [] Follow-up   [x] New Consult    Shantanu Casillas is a 68 y.o. male referred by:   [x] Physician  [] Nursing  [] Other:         PAST MEDICAL HISTORY    Past Medical History:   Diagnosis Date    Arthritis     CAD (coronary artery disease)     Chronic obstructive pulmonary disease (HCC)     Diabetes (HCC)     Gout     Hypertension     Ill-defined condition     Sleep apnea     cpap        PAST SURGICAL HISTORY    Past Surgical History:   Procedure Laterality Date    FOOT DEBRIDEMENT Right 12/15/2023    INCISION AND DRAINAGE RIGHT FIRST METATARSAL PHALANGEAL JOINT performed by Polo Oviedo DPM at [facility] MAIN OR    FOOT SURGERY      right heel - foriegn object    HX VEIN ABLATION ADHESIVE      ORTHOPEDIC SURGERY      back surgery    PACEMAKER      aicd -       FAMILY HISTORY    Family History   Problem Relation Age of Onset    Diabetes Mother     Heart Disease Father     Hypertension Father     Diabetes Sister     Diabetes Brother     Hypertension Mother     Heart Disease Mother     Kidney Disease Mother          ALLERGIES    Allergies   Allergen Reactions    Amitriptyline Angioedema    Naproxen      Other reaction(s): Unknown (comments)  Pt not sure of reaction    Sulfa Antibiotics Nausea And Vomiting       MEDICATIONS    No current facility-administered medications on file prior to encounter.     Current Outpatient Medications on File Prior to Encounter   Medication Sig Dispense Refill    azelastine (ASTELIN) 0.1 % nasal spray 2 sprays by Nasal route 2 times daily Use in each nostril as directed      benzocaine-menthol (CEPACOL SORE THROAT) 15-3.6 MG lozenge Take 1 lozenge by mouth every 2 hours as needed for Sore Throat      calcium carbonate (TUMS) 500 MG chewable tablet Take 1 tablet by mouth 2 times daily      cetirizine (ZYRTEC) 10 MG tablet Take 1 tablet by  integumentary assessment and re-secure.  Maintain the external  incontinence management system to manage  incontinence.  Apply zinc paste BID and prn for soiling to buttocks and gluteal folds / cleft to protect skin from incontinence related breakdown.  Use foam wedge to turn q2h at 30 degree angle to offload sacrum.  Float heels with 1-2 pillows lengthwise while in bed for offloading of the heels.  Maintain HOB at 30 degrees or less, if not contraindicated, to reduce pressure to buttocks and sacrum.  Raise foot of bed to help prevent friction and shear injury from sliding down in the bed.  Re-consult [facility] for other skin/wound care concerns.     Discharge Wound Care Needs: TBD by Podiatrist    Teaching completed with:   [] Patient           [] Family member       [] Caregiver          [] Nursing  [] Other    Patient/Caregiver Teaching:  Level of patient/caregiver understanding able to:   [] Indicates understanding       [] Needs reinforcement  [] Unsuccessful      [] Verbal Understanding  [] Demonstrated understanding       [] No evidence of learning  [] Refused teaching         [] N/A       Electronically signed by Viki Lara RN on 8/16/2024 at 2:01 PM

## 2024-08-16 NOTE — PLAN OF CARE
Problem: Discharge Planning  Goal: Discharge to home or other facility with appropriate resources  Outcome: Progressing     Problem: Skin/Tissue Integrity  Goal: Absence of new skin breakdown  Description: 1.  Monitor for areas of redness and/or skin breakdown  2.  Assess vascular access sites hourly  3.  Every 4-6 hours minimum:  Change oxygen saturation probe site  4.  Every 4-6 hours:  If on nasal continuous positive airway pressure, respiratory therapy assess nares and determine need for appliance change or resting period.  Outcome: Progressing     Problem: ABCDS Injury Assessment  Goal: Absence of physical injury  Outcome: Progressing     Problem: Safety - Adult  Goal: Free from fall injury  Outcome: Progressing     Problem: Respiratory - Adult  Goal: Achieves optimal ventilation and oxygenation  Outcome: Progressing     Problem: Cardiovascular - Adult  Goal: Maintains optimal cardiac output and hemodynamic stability  Outcome: Progressing  Goal: Absence of cardiac dysrhythmias or at baseline  Outcome: Progressing     Problem: Skin/Tissue Integrity - Adult  Goal: Incisions, wounds, or drain sites healing without S/S of infection  Outcome: Progressing     Problem: Infection - Adult  Goal: Absence of infection at discharge  Outcome: Progressing  Goal: Absence of infection during hospitalization  Outcome: Progressing  Goal: Absence of fever/infection during anticipated neutropenic period  Outcome: Progressing     Problem: Metabolic/Fluid and Electrolytes - Adult  Goal: Electrolytes maintained within normal limits  Outcome: Progressing  Goal: Hemodynamic stability and optimal renal function maintained  Outcome: Progressing

## 2024-08-16 NOTE — PROGRESS NOTES
Spiritual Health Assessment/Progress Note  [facility]    Attempted Encounter,  ,  ,      Name: Shantanu Casillas MRN: [id number]    Age: 68 y.o.     Sex: male   Language: English   Taoist: Jehovah's witness   Volume overload     Date: 8/16/2024            Total Time Calculated: 7 min              Spiritual Assessment began in [facility] [place] [facility]        Referral/Consult From: Rounding   Encounter Overview/Reason: Attempted Encounter  Service Provided For: Patient    Lakesha, Belief, Meaning:   Patient unable to communicate at this time  Family/Friends No family/friends present      Importance and Influence:  Patient unable to communicate at this time  Family/Friends no family/friends present    Community:  Patient Other: Patient was resting at this time of visit.  Family/Friends Other: No fam present.    Assessment and Plan of Care:     Patient Interventions include: Other: Patient was resting at this time of visit. Left a pastoral care message on the table.   Family/Friends Interventions include: Other: None    Patient Plan of Care: Spiritual Care available upon further referral  Family/Friends Plan of Care: Spiritual Care available upon further referral     is available if needed.    Electronically signed by Makenna Whitaker Chaplain Resident on 8/16/2024 at 3:04 PM

## 2024-08-16 NOTE — H&P
History and Physical    NAME:   Shantanu Casillas   :  1955   MRN:  [id number]     Date/Time: 2024 9:06 AM    Patient PCP: Ed Pino MD  ______________________________________________________________________       Subjective:     CHIEF COMPLAINT:   Shortness of breath      HISTORY OF PRESENT ILLNESS:     General Daily Progress Note  Patient is a 68 y.o. year old male with past medical history of arthritis, coronary artery disease, COPD, diabetes, gout, hypertension, sleep apnea presents to the ED with dyspnea. Patient reports he was diagnosed with COVID 1 week prior, and has been increasingly short of breath since then. Also states he was taken off of his diuretic and has had increasing swelling in the legs and abdomen. He has a productive cough, states he feels like he cannot \"get it out \". No nausea or vomiting. No fevers at home.       HPI-patient alert and in moderate distress.  He endorses shortness of breath, coughing, nausea without vomiting.  He denies chest pain, palpitations, fever, headache, abdominal pain and diarrhea.    Vitals-blood pressure 127/84 pulse 98 temperature 97.5 respirations 18 O2 97%    Labs-potassium 5.4, BUN 67, creatinine 3.38, GFR 19, glucose 248, BNP 3528, ammonia 43, hemoglobin 8.9, hematocrit 28.4, MCV 95, pH 7.32, pCO2 49, D-dimer 2.97    COVID-19 detected on PCR    EKG demonstrates ventricular paced rhythm    Chest x-ray performed 8/15-cardiomegaly and mild pulmonary edema    Consulted nephrology 8/15    Consulted infectious disease 8/15 -  Check CRP, procal, LDH and ferritin  Continue Linezolid for now    Consulted cardiology -may require echocardiogram for further management as patient is clinically stable for me to expose other team member in the hospital to proceed with echocardiogram as it is not deemed necessary based on my clinical assessment. Optimize guideline directed medical management for cardiomyopathy      Past Medical History:   Diagnosis  at 08/15/24 2244    clonazePAM (KLONOPIN) tablet 0.25 mg, 0.25 mg, Oral, BID PRN, Kitty Pehlps APRN - CNP    ferrous sulfate (IRON 325) tablet 325 mg, 325 mg, Oral, Daily with breakfast, Kitty Phelps APRN - CNP    fluconazole (DIFLUCAN) tablet 200 mg, 200 mg, Oral, Daily, Kitty Phelps APRN - CNP, 200 mg at 08/15/24 2244    fluticasone (FLONASE) 50 MCG/ACT nasal spray 2 spray, 2 spray, Each Nostril, Daily, Kitty Phelps APRN - CNP    budesonide-formoterol (SYMBICORT) 80-4.5 MCG/ACT inhaler 2 puff, 2 puff, Inhalation, BID RT, 2 puff at 08/15/24 2023 **AND** tiotropium (SPIRIVA RESPIMAT) 2.5 MCG/ACT inhaler 2 puff, 2 puff, Inhalation, Daily RT, Kitty Phelps APRN - CNP    guaiFENesin (ROBITUSSIN) 100 MG/5ML liquid 200 mg, 200 mg, Oral, 4x Daily PRN, Kitty Phelps APRN - CNP    hydrALAZINE (APRESOLINE) tablet 25 mg, 25 mg, Oral, Q6H PRN, Kitty Phelps APRN - CNP    hydrOXYzine HCl (ATARAX) tablet 10 mg, 10 mg, Oral, Q6H PRN, Kitty Phelps APRN - CNP    [Held by provider] ipratropium (ATROVENT) 0.02 % nebulizer solution 0.5 mg, 0.5 mg, Nebulization, 4x Daily RT, Kitty Phelps APRN - CNP    ipratropium 0.5 mg-albuterol 2.5 mg (DUONEB) nebulizer solution 1 Dose, 1 Dose, Inhalation, Q4H PRN, Kitty Phelps APRN - CNP    lidocaine 4 % external patch 1 patch, 1 patch, TransDERmal, Daily, Kitty Phelps APRN - CNP, 1 patch at 08/15/24 2245    magnesium oxide (MAG-OX) tablet 400 mg, 400 mg, Oral, Daily, Kitty Phelps, ROBINA - CNP, 400 mg at 08/15/24 2243    melatonin tablet 3 mg, 3 mg, Oral, Nightly, Kitty Phelps, ROBINA - CNP, 3 mg at 08/15/24 2242    Virt-Caps 1 mg, 1 capsule, Oral, ESTHER, Kitty Phelps, ROBINA - CNP    nystatin (MYCOSTATIN) 684104 UNIT/ML suspension 500,000 Units, 500,000 Units, Oral, 4x Daily PC & HS, Kitty hPelps, ROBINA - CNP, 500,000 Units at 08/15/24 2247    pantoprazole (PROTONIX) tablet 40 mg, 40 mg, Oral, ESTHER AC, Kitty Phelps, APRN  Negative.      Objective:   VITALS:    Vitals:    08/16/24 0848   BP: 127/84   Pulse: 90   Resp: 18   Temp: 97.5 °F (36.4 °C)   SpO2: 97%       Physical Exam:   Constitutional: pt is oriented to person, place, and time.   HENT:   Head: Normocephalic and atraumatic.   Eyes: Pupils are equal, round, and reactive to light. EOM are normal.   Cardiovascular: Normal rate, regular rhythm and normal heart sounds.   Pulmonary/Chest: Breath sounds normal. No wheezes. No rales.   Exhibits no tenderness.   Abdominal: Soft. Bowel sounds are normal. There is no abdominal tenderness. There is no rebound and no guarding.   Musculoskeletal: Normal range of motion.   Neurological: pt is alert and oriented to person, place, and time. Alert. Normal strength. No cranial nerve deficit or sensory deficit. Displays a negative Romberg sign.        ASSESSMENT & PLAN number melatonin    COVID-19  Volume/fluid overload  Diabetes  COPD  Anemia  Hyperkalemia  Acute CHF  Respiratory acidosis with metabolic alkalosis  Acute kidney injury on chronic kidney disease stage III  Sleep apnea  Hypertension  Arthritis    Vitamin C 1000 mg oral twice daily  Aspirin 81 Mg oral daily  Lipitor 40 Mg oral nightly  Astelin 0.1% nasal spray 2 spray each nostril 2 times daily  Azithromycin 250 mg oral Daily  B complex vitamin capsule oral daily  Symbicort 80-4.5 inhalation 2 puff  Spiriva Respimat 2.5 mcg/ACT inhaler 2 puff daily  Tums 500 mg oral 2 times daily  Zyrtec 10 mg oral nightly  Decadron 6 Mg oral daily  Iron tablet 325 mg oral Daily  Diflucan 200 mg oral Daily  Flonase 50 mm CG/ACT nasal spray 2 spray each nostril daily  Lasix injection 60 mg intervenous 3 times daily  Neurontin 400 mg oral twice daily  Lantus injection 32 units subcutaneous nightly  Lantus injection 80 units subcutaneous daily  Lidocaine 4% external patch every 12 hours daily.  Zyvox 600 mg oral every 12 hours  Mag-ox 400 Mg oral daily  Melatonin 3 mg nightly  Nystatin 500,000 units

## 2024-08-17 LAB
ALBUMIN SERPL-MCNC: 2.4 G/DL (ref 3.5–5)
ALBUMIN SERPL-MCNC: 2.5 G/DL (ref 3.5–5)
ALBUMIN/GLOB SERPL: 0.5 (ref 1.1–2.2)
ALP SERPL-CCNC: 116 U/L (ref 45–117)
ALT SERPL-CCNC: 25 U/L (ref 12–78)
ANION GAP SERPL CALC-SCNC: 7 MMOL/L (ref 5–15)
ANION GAP SERPL CALC-SCNC: 7 MMOL/L (ref 5–15)
AST SERPL W P-5'-P-CCNC: 23 U/L (ref 15–37)
BILIRUB SERPL-MCNC: 0.3 MG/DL (ref 0.2–1)
BNP SERPL-MCNC: 9576 PG/ML
BUN SERPL-MCNC: 82 MG/DL (ref 6–20)
BUN SERPL-MCNC: 86 MG/DL (ref 6–20)
BUN/CREAT SERPL: 24 (ref 12–20)
BUN/CREAT SERPL: 24 (ref 12–20)
CA-I BLD-MCNC: 8.9 MG/DL (ref 8.5–10.1)
CA-I BLD-MCNC: 9 MG/DL (ref 8.5–10.1)
CHLORIDE SERPL-SCNC: 101 MMOL/L (ref 97–108)
CHLORIDE SERPL-SCNC: 101 MMOL/L (ref 97–108)
CO2 SERPL-SCNC: 27 MMOL/L (ref 21–32)
CO2 SERPL-SCNC: 29 MMOL/L (ref 21–32)
CREAT SERPL-MCNC: 3.44 MG/DL (ref 0.7–1.3)
CREAT SERPL-MCNC: 3.6 MG/DL (ref 0.7–1.3)
ERYTHROCYTE [DISTWIDTH] IN BLOOD BY AUTOMATED COUNT: 19.8 % (ref 11.5–14.5)
FERRITIN SERPL-MCNC: 292 NG/ML (ref 26–388)
GLOBULIN SER CALC-MCNC: 5.4 G/DL (ref 2–4)
GLUCOSE BLD STRIP.AUTO-MCNC: 269 MG/DL (ref 65–100)
GLUCOSE BLD STRIP.AUTO-MCNC: 279 MG/DL (ref 65–100)
GLUCOSE BLD STRIP.AUTO-MCNC: 338 MG/DL (ref 65–100)
GLUCOSE BLD STRIP.AUTO-MCNC: 356 MG/DL (ref 65–100)
GLUCOSE BLD STRIP.AUTO-MCNC: 391 MG/DL (ref 65–100)
GLUCOSE SERPL-MCNC: 301 MG/DL (ref 65–100)
GLUCOSE SERPL-MCNC: 308 MG/DL (ref 65–100)
HCT VFR BLD AUTO: 30.8 % (ref 36.6–50.3)
HGB BLD-MCNC: 9.7 G/DL (ref 12.1–17)
MCH RBC QN AUTO: 29.7 PG (ref 26–34)
MCHC RBC AUTO-ENTMCNC: 31.5 G/DL (ref 30–36.5)
MCV RBC AUTO: 94.2 FL (ref 80–99)
NRBC # BLD: 0.12 K/UL (ref 0–0.01)
NRBC BLD-RTO: 1 PER 100 WBC
PERFORMED BY:: ABNORMAL
PHOSPHATE SERPL-MCNC: 3.6 MG/DL (ref 2.6–4.7)
PLATELET # BLD AUTO: 252 K/UL (ref 150–400)
PMV BLD AUTO: 10.3 FL (ref 8.9–12.9)
POTASSIUM SERPL-SCNC: 4.7 MMOL/L (ref 3.5–5.1)
POTASSIUM SERPL-SCNC: 5.2 MMOL/L (ref 3.5–5.1)
PROT SERPL-MCNC: 7.9 G/DL (ref 6.4–8.2)
RBC # BLD AUTO: 3.27 M/UL (ref 4.1–5.7)
SODIUM SERPL-SCNC: 135 MMOL/L (ref 136–145)
SODIUM SERPL-SCNC: 137 MMOL/L (ref 136–145)
WBC # BLD AUTO: 12.1 K/UL (ref 4.1–11.1)

## 2024-08-17 PROCEDURE — 83880 ASSAY OF NATRIURETIC PEPTIDE: CPT

## 2024-08-17 PROCEDURE — 87077 CULTURE AEROBIC IDENTIFY: CPT

## 2024-08-17 PROCEDURE — 2060000000 HC ICU INTERMEDIATE R&B

## 2024-08-17 PROCEDURE — 87070 CULTURE OTHR SPECIMN AEROBIC: CPT

## 2024-08-17 PROCEDURE — 36415 COLL VENOUS BLD VENIPUNCTURE: CPT

## 2024-08-17 PROCEDURE — 6360000002 HC RX W HCPCS: Performed by: INTERNAL MEDICINE

## 2024-08-17 PROCEDURE — 94640 AIRWAY INHALATION TREATMENT: CPT

## 2024-08-17 PROCEDURE — 2580000003 HC RX 258: Performed by: NURSE PRACTITIONER

## 2024-08-17 PROCEDURE — 85027 COMPLETE CBC AUTOMATED: CPT

## 2024-08-17 PROCEDURE — 2500000003 HC RX 250 WO HCPCS: Performed by: FAMILY MEDICINE

## 2024-08-17 PROCEDURE — 82962 GLUCOSE BLOOD TEST: CPT

## 2024-08-17 PROCEDURE — 87205 SMEAR GRAM STAIN: CPT

## 2024-08-17 PROCEDURE — 2700000000 HC OXYGEN THERAPY PER DAY

## 2024-08-17 PROCEDURE — 80053 COMPREHEN METABOLIC PANEL: CPT

## 2024-08-17 PROCEDURE — 87186 SC STD MICRODIL/AGAR DIL: CPT

## 2024-08-17 PROCEDURE — 6360000002 HC RX W HCPCS: Performed by: FAMILY MEDICINE

## 2024-08-17 PROCEDURE — 80069 RENAL FUNCTION PANEL: CPT

## 2024-08-17 PROCEDURE — 6370000000 HC RX 637 (ALT 250 FOR IP): Performed by: FAMILY MEDICINE

## 2024-08-17 PROCEDURE — 6360000002 HC RX W HCPCS: Performed by: NURSE PRACTITIONER

## 2024-08-17 PROCEDURE — 6370000000 HC RX 637 (ALT 250 FOR IP): Performed by: NURSE PRACTITIONER

## 2024-08-17 PROCEDURE — 94761 N-INVAS EAR/PLS OXIMETRY MLT: CPT

## 2024-08-17 PROCEDURE — 6370000000 HC RX 637 (ALT 250 FOR IP): Performed by: INTERNAL MEDICINE

## 2024-08-17 RX ORDER — FUROSEMIDE 10 MG/ML
80 INJECTION INTRAMUSCULAR; INTRAVENOUS ONCE
Status: COMPLETED | OUTPATIENT
Start: 2024-08-17 | End: 2024-08-17

## 2024-08-17 RX ORDER — FUROSEMIDE 10 MG/ML
60 INJECTION INTRAMUSCULAR; INTRAVENOUS DAILY
Status: DISCONTINUED | OUTPATIENT
Start: 2024-08-18 | End: 2024-08-23

## 2024-08-17 RX ORDER — FUROSEMIDE 10 MG/ML
80 INJECTION INTRAMUSCULAR; INTRAVENOUS 3 TIMES DAILY
Status: DISCONTINUED | OUTPATIENT
Start: 2024-08-17 | End: 2024-08-17

## 2024-08-17 RX ORDER — OXYCODONE HYDROCHLORIDE 10 MG/1
10 TABLET ORAL EVERY 8 HOURS
Status: DISCONTINUED | OUTPATIENT
Start: 2024-08-17 | End: 2024-08-24 | Stop reason: HOSPADM

## 2024-08-17 RX ADMIN — INSULIN GLARGINE 80 UNITS: 100 INJECTION, SOLUTION SUBCUTANEOUS at 10:14

## 2024-08-17 RX ADMIN — ZINC SULFATE 220 MG (50 MG) CAPSULE 50 MG: CAPSULE at 09:45

## 2024-08-17 RX ADMIN — INSULIN LISPRO 12 UNITS: 100 INJECTION, SOLUTION INTRAVENOUS; SUBCUTANEOUS at 10:14

## 2024-08-17 RX ADMIN — NYSTATIN 500000 UNITS: 100000 SUSPENSION ORAL at 13:37

## 2024-08-17 RX ADMIN — OXYCODONE HYDROCHLORIDE AND ACETAMINOPHEN 1000 MG: 500 TABLET ORAL at 09:43

## 2024-08-17 RX ADMIN — Medication 400 UNITS: at 09:42

## 2024-08-17 RX ADMIN — FLUCONAZOLE 200 MG: 100 TABLET ORAL at 09:46

## 2024-08-17 RX ADMIN — Medication 1 LOZENGE: at 15:01

## 2024-08-17 RX ADMIN — NYSTATIN 500000 UNITS: 100000 SUSPENSION ORAL at 17:36

## 2024-08-17 RX ADMIN — AZITHROMYCIN DIHYDRATE 250 MG: 500 TABLET ORAL at 17:24

## 2024-08-17 RX ADMIN — OXYCODONE HYDROCHLORIDE 10 MG: 10 TABLET ORAL at 14:57

## 2024-08-17 RX ADMIN — DEXAMETHASONE 6 MG: 4 TABLET ORAL at 09:41

## 2024-08-17 RX ADMIN — FERROUS SULFATE TAB 325 MG (65 MG ELEMENTAL FE) 325 MG: 325 (65 FE) TAB at 09:47

## 2024-08-17 RX ADMIN — Medication 1 LOZENGE: at 13:07

## 2024-08-17 RX ADMIN — Medication 1 LOZENGE: at 09:48

## 2024-08-17 RX ADMIN — NYSTATIN 500000 UNITS: 100000 SUSPENSION ORAL at 09:40

## 2024-08-17 RX ADMIN — LINEZOLID 600 MG: 600 TABLET, FILM COATED ORAL at 09:42

## 2024-08-17 RX ADMIN — Medication 1 LOZENGE: at 22:13

## 2024-08-17 RX ADMIN — INSULIN LISPRO 8 UNITS: 100 INJECTION, SOLUTION INTRAVENOUS; SUBCUTANEOUS at 17:25

## 2024-08-17 RX ADMIN — SODIUM CHLORIDE, PRESERVATIVE FREE 10 ML: 5 INJECTION INTRAVENOUS at 22:10

## 2024-08-17 RX ADMIN — INSULIN LISPRO 16 UNITS: 100 INJECTION, SOLUTION INTRAVENOUS; SUBCUTANEOUS at 12:26

## 2024-08-17 RX ADMIN — ACETAMINOPHEN 650 MG: 325 TABLET ORAL at 17:36

## 2024-08-17 RX ADMIN — Medication 3 MG: at 22:08

## 2024-08-17 RX ADMIN — Medication 2 PUFF: at 09:28

## 2024-08-17 RX ADMIN — FUROSEMIDE 80 MG: 10 INJECTION, SOLUTION INTRAMUSCULAR; INTRAVENOUS at 13:52

## 2024-08-17 RX ADMIN — HYOSCYAMINE SULFATE: 16 SOLUTION at 10:28

## 2024-08-17 RX ADMIN — INSULIN GLARGINE 32 UNITS: 100 INJECTION, SOLUTION SUBCUTANEOUS at 22:07

## 2024-08-17 RX ADMIN — Medication 1 LOZENGE: at 01:22

## 2024-08-17 RX ADMIN — FLUTICASONE PROPIONATE 2 SPRAY: 50 SPRAY, METERED NASAL at 09:48

## 2024-08-17 RX ADMIN — PANTOPRAZOLE SODIUM 40 MG: 40 TABLET, DELAYED RELEASE ORAL at 07:32

## 2024-08-17 RX ADMIN — CETIRIZINE HYDROCHLORIDE 10 MG: 10 TABLET, FILM COATED ORAL at 22:08

## 2024-08-17 RX ADMIN — NYSTATIN 500000 UNITS: 100000 SUSPENSION ORAL at 22:07

## 2024-08-17 RX ADMIN — Medication 400 MG: at 09:45

## 2024-08-17 RX ADMIN — Medication 2 PUFF: at 20:54

## 2024-08-17 RX ADMIN — WHITE PETROLATUM,ZINC OXIDE: 57; 17 PASTE TOPICAL at 10:28

## 2024-08-17 RX ADMIN — FUROSEMIDE 60 MG: 10 INJECTION, SOLUTION INTRAMUSCULAR; INTRAVENOUS at 09:49

## 2024-08-17 RX ADMIN — LINEZOLID 600 MG: 600 TABLET, FILM COATED ORAL at 22:08

## 2024-08-17 RX ADMIN — CALCIUM CARBONATE 500 MG: 500 TABLET, CHEWABLE ORAL at 09:45

## 2024-08-17 RX ADMIN — FUROSEMIDE 80 MG: 10 INJECTION, SOLUTION INTRAMUSCULAR; INTRAVENOUS at 22:51

## 2024-08-17 RX ADMIN — ATORVASTATIN CALCIUM 40 MG: 40 TABLET, FILM COATED ORAL at 22:08

## 2024-08-17 RX ADMIN — OXYCODONE HYDROCHLORIDE 5 MG: 5 TABLET ORAL at 10:13

## 2024-08-17 RX ADMIN — CALCIUM CARBONATE 500 MG: 500 TABLET, CHEWABLE ORAL at 22:08

## 2024-08-17 RX ADMIN — SENNOSIDES 8.6 MG: 8.6 TABLET, FILM COATED ORAL at 09:45

## 2024-08-17 RX ADMIN — Medication 2 PUFF: at 09:27

## 2024-08-17 RX ADMIN — GABAPENTIN 400 MG: 400 CAPSULE ORAL at 22:08

## 2024-08-17 RX ADMIN — INSULIN LISPRO 15 UNITS: 100 INJECTION, SOLUTION INTRAVENOUS; SUBCUTANEOUS at 13:45

## 2024-08-17 RX ADMIN — GABAPENTIN 400 MG: 400 CAPSULE ORAL at 09:45

## 2024-08-17 RX ADMIN — OXYCODONE HYDROCHLORIDE 10 MG: 10 TABLET ORAL at 22:51

## 2024-08-17 RX ADMIN — OXYCODONE HYDROCHLORIDE AND ACETAMINOPHEN 1000 MG: 500 TABLET ORAL at 22:08

## 2024-08-17 RX ADMIN — TAMSULOSIN HYDROCHLORIDE 0.8 MG: 0.4 CAPSULE ORAL at 09:43

## 2024-08-17 RX ADMIN — Medication 1 MG: at 09:47

## 2024-08-17 RX ADMIN — Medication 400 UNITS: at 22:08

## 2024-08-17 RX ADMIN — GABAPENTIN 400 MG: 400 CAPSULE ORAL at 13:52

## 2024-08-17 RX ADMIN — Medication 1 CAPSULE: at 09:44

## 2024-08-17 RX ADMIN — ASPIRIN 81 MG CHEWABLE TABLET 81 MG: 81 TABLET CHEWABLE at 09:43

## 2024-08-17 ASSESSMENT — PAIN DESCRIPTION - LOCATION
LOCATION: THROAT
LOCATION: BACK;NECK;LEG
LOCATION: GENERALIZED
LOCATION: BACK;LEG;KNEE;NECK
LOCATION: THROAT
LOCATION: NECK;BACK;LEG
LOCATION: THROAT

## 2024-08-17 ASSESSMENT — PAIN DESCRIPTION - DESCRIPTORS
DESCRIPTORS: OTHER (COMMENT)
DESCRIPTORS: ACHING
DESCRIPTORS: ACHING
DESCRIPTORS: OTHER (COMMENT)
DESCRIPTORS: ACHING

## 2024-08-17 ASSESSMENT — PAIN - FUNCTIONAL ASSESSMENT
PAIN_FUNCTIONAL_ASSESSMENT: ACTIVITIES ARE NOT PREVENTED
PAIN_FUNCTIONAL_ASSESSMENT: PREVENTS OR INTERFERES SOME ACTIVE ACTIVITIES AND ADLS
PAIN_FUNCTIONAL_ASSESSMENT: ACTIVITIES ARE NOT PREVENTED

## 2024-08-17 ASSESSMENT — PAIN SCALES - GENERAL
PAINLEVEL_OUTOF10: 5
PAINLEVEL_OUTOF10: 5
PAINLEVEL_OUTOF10: 10
PAINLEVEL_OUTOF10: 0
PAINLEVEL_OUTOF10: 9
PAINLEVEL_OUTOF10: 4
PAINLEVEL_OUTOF10: 8
PAINLEVEL_OUTOF10: 9
PAINLEVEL_OUTOF10: 3
PAINLEVEL_OUTOF10: 0
PAINLEVEL_OUTOF10: 0
PAINLEVEL_OUTOF10: 7

## 2024-08-17 ASSESSMENT — PAIN DESCRIPTION - PAIN TYPE
TYPE: ACUTE PAIN
TYPE: CHRONIC PAIN
TYPE: ACUTE PAIN
TYPE: CHRONIC PAIN
TYPE: CHRONIC PAIN

## 2024-08-17 ASSESSMENT — PAIN DESCRIPTION - ORIENTATION
ORIENTATION: OTHER (COMMENT)

## 2024-08-17 ASSESSMENT — PAIN DESCRIPTION - FREQUENCY
FREQUENCY: INTERMITTENT
FREQUENCY: CONTINUOUS
FREQUENCY: INTERMITTENT

## 2024-08-17 ASSESSMENT — PAIN DESCRIPTION - ONSET
ONSET: GRADUAL

## 2024-08-17 NOTE — PROGRESS NOTES
Pt glucose 356, notified provider and administered 16 units lispro. Will recheck glucose 1 hour from administration

## 2024-08-17 NOTE — PROGRESS NOTES
Pt on repeat blood sugar resulted 391. Informed provider and verbal with read back, 15 units of Humalog to be administered with recheck in one hour.

## 2024-08-17 NOTE — PLAN OF CARE
Problem: Discharge Planning  Goal: Discharge to home or other facility with appropriate resources  Outcome: Progressing     Problem: Skin/Tissue Integrity  Goal: Absence of new skin breakdown  Description: 1.  Monitor for areas of redness and/or skin breakdown  2.  Assess vascular access sites hourly  3.  Every 4-6 hours minimum:  Change oxygen saturation probe site  4.  Every 4-6 hours:  If on nasal continuous positive airway pressure, respiratory therapy assess nares and determine need for appliance change or resting period.  8/17/2024 0804 by Alhaji Jay RN  Outcome: Progressing  8/17/2024 0317 by Ирина De Anda RN  Outcome: Progressing     Problem: ABCDS Injury Assessment  Goal: Absence of physical injury  8/17/2024 0804 by Alhaji Jay RN  Outcome: Progressing  8/17/2024 0317 by Ирина De Anda RN  Outcome: Progressing     Problem: Safety - Adult  Goal: Free from fall injury  8/17/2024 0804 by Alhaji Jay RN  Outcome: Progressing  8/17/2024 0317 by Ирина De Anda RN  Outcome: Progressing     Problem: Respiratory - Adult  Goal: Achieves optimal ventilation and oxygenation  Outcome: Progressing     Problem: Cardiovascular - Adult  Goal: Maintains optimal cardiac output and hemodynamic stability  Outcome: Progressing  Goal: Absence of cardiac dysrhythmias or at baseline  Outcome: Progressing     Problem: Skin/Tissue Integrity - Adult  Goal: Incisions, wounds, or drain sites healing without S/S of infection  Outcome: Progressing     Problem: Infection - Adult  Goal: Absence of infection at discharge  Outcome: Progressing  Goal: Absence of infection during hospitalization  Outcome: Progressing  Goal: Absence of fever/infection during anticipated neutropenic period  Outcome: Progressing     Problem: Metabolic/Fluid and Electrolytes - Adult  Goal: Electrolytes maintained within normal limits  Outcome: Progressing  Goal: Hemodynamic stability and optimal renal function maintained  Outcome: Progressing      Problem: Pain  Goal: Verbalizes/displays adequate comfort level or baseline comfort level  Outcome: Progressing     Problem: Chronic Conditions and Co-morbidities  Goal: Patient's chronic conditions and co-morbidity symptoms are monitored and maintained or improved  Outcome: Progressing

## 2024-08-17 NOTE — PROGRESS NOTES
Physician Progress Note      PATIENT:               JEFFY REED  [facility] #:                  [id number]  :                       1955  ADMIT DATE:       2024 4:20 PM  DISCH DATE:        2024 1:43 PM  RESPONDING  PROVIDER #:        Ed Pino MD          QUERY TEXT:    Dear Dr. Pino,    Pt admitted with bilateral leg cellulitis. Pt noted to have DM. If possible,   please document in progress notes and discharge summary the relationship, if   any, between cellulitis and DM.    The medical record reflects the following:  Risk Factors: 68 year old male, CKD, CHF, morbid obesity  Clinical Indicators:  H&P - Acute cellulitis of bilateral leg  Diabetic foot  Type 2 diabetes  Glucose:  270;  275;  270;  233   A1C: 9.3  Treatment: Insulin SC, IV Zosyn, IV Vancomcyin      Please email [email or web address] with any questions  Options provided:  -- bilateral leg cellulitis associated with Diabetes  -- bilateral leg cellulitis unrelated to Diabetes  -- Other - I will add my own diagnosis  -- Disagree - Not applicable / Not valid  -- Disagree - Clinically unable to determine / Unknown  -- Refer to Clinical Documentation Reviewer    PROVIDER RESPONSE TEXT:    bilateral leg cellulitis associated with Diabetes.    Query created by: Mili Ruano on 2024 5:28 AM      QUERY TEXT:    Patient admitted with cellulitis. Noted documentation of acute on chronic   respiratory failure in  Pulmonary consult and following notes. In order to   support the diagnosis of acute respiratory failure, please include additional   clinical indicators in your documentation.  Or please document if the   diagnosis of acute respiratory failure has been ruled out after further study.    The medical record reflects the following:  Risk Factors: 68 year old male, COPD, CHF  Clinical Indicators:  Pulmonary consult - Acute on chronic respiratory   failure with Hypoxia  chronically on home

## 2024-08-17 NOTE — PROGRESS NOTES
History and Physical    NAME:   Shantanu Casillas   :  1955   MRN:  [id number]     Date/Time: 2024 2:35 PM    Patient PCP: Ed Pino MD  ______________________________________________________________________       Subjective:     CHIEF COMPLAINT:   Shortness of breath      HISTORY OF PRESENT ILLNESS:     General Daily Progress Note  Patient is a 68 y.o. year old male with past medical history of arthritis, coronary artery disease, COPD, diabetes, gout, hypertension, sleep apnea presents to the ED with dyspnea. Patient reports he was diagnosed with COVID 1 week prior, and has been increasingly short of breath since then. Also states he was taken off of his diuretic and has had increasing swelling in the legs and abdomen. He has a productive cough, states he feels like he cannot \"get it out \". No nausea or vomiting. No fevers at home.       HPI-patient alert and in moderate distress.  He endorses shortness of breath, coughing, nausea without vomiting.  He denies chest pain, palpitations, fever, headache, abdominal pain and diarrhea.    Vitals-blood pressure 127/84 pulse 98 temperature 97.5 respirations 18 O2 97%    Labs-potassium 5.4, BUN 67, creatinine 3.38, GFR 19, glucose 248, BNP 3528, ammonia 43, hemoglobin 8.9, hematocrit 28.4, MCV 95, pH 7.32, pCO2 49, D-dimer 2.97    COVID-19 detected on PCR    EKG demonstrates ventricular paced rhythm    Chest x-ray performed 8/15-cardiomegaly and mild pulmonary edema    Consulted nephrology 8/15    Consulted infectious disease 8/15 -  Check CRP, procal, LDH and ferritin  Continue Linezolid for now    Consulted cardiology -may require echocardiogram for further management as patient is clinically stable for me to expose other team member in the hospital to proceed with echocardiogram as it is not deemed necessary based on my clinical assessment. Optimize guideline directed medical management for cardiomyopathy        HPI-patient was lying down  lozenge by mouth every 2 hours as needed for Sore Throat   Yes Doretha Mccarthy MD   calcium carbonate (TUMS) 500 MG chewable tablet Take 1 tablet by mouth 2 times daily   Yes Doretha Mccarthy MD   cetirizine (ZYRTEC) 10 MG tablet Take 1 tablet by mouth daily   Yes Doretha Mccarthy MD   clonazePAM (KLONOPIN) 0.5 MG tablet Take 0.5 tablets by mouth 2 times daily as needed for Anxiety.   Yes Doretha Mccarthy MD   diclofenac sodium (VOLTAREN) 1 % GEL Apply 4 g topically 4 times daily as needed for Pain   Yes Doretha Mccarthy MD   fluconazole (DIFLUCAN) 100 MG tablet Take 2 tablets by mouth daily For 14 days stop on 8/27/24   Yes Doretha Mccarthy MD   fluticasone (FLONASE) 50 MCG/ACT nasal spray 2 sprays by Each Nostril route daily   Yes Doretha Mccarthy MD   guaiFENesin (ROBITUSSIN) 100 MG/5ML syrup Take 10 mLs by mouth 4 times daily as needed for Cough   Yes Doretha Mccarthy MD   heparin, porcine, 5000 UNIT/ML injection Inject 1 mL into the skin every 8 hours   Yes Doretha Mccarthy MD   hydrALAZINE (APRESOLINE) 25 MG tablet Take 1 tablet by mouth every 6 hours as needed (elevated blood pressure)   Yes Doretha Mccarthy MD   hydrOXYzine HCl (ATARAX) 10 MG tablet Take 1 tablet by mouth every 6 hours as needed for Anxiety   Yes Doretha Mccarthy MD   insulin regular (HUMULIN R;NOVOLIN R) 100 UNIT/ML injection Inject into the skin See Admin Instructions Sliding scale   Yes Doretha Mccarthy MD   ipratropium (ATROVENT HFA) 17 MCG/ACT inhaler Inhale 2 puffs into the lungs every 6 hours   Yes Doretha Mccarthy MD   lidocaine (LIDODERM) 5 % Place 1 patch onto the skin daily 12 hours on, 12 hours off.   Yes Doretha Mccarthy MD   linezolid (ZYVOX) 600 MG tablet Take 1 tablet by mouth 2 times daily   Yes Doretha Mccarthy MD   loratadine (CLARITIN) 10 MG tablet Take 1 tablet by mouth daily   Yes Doretha Mccarthy MD   magnesium oxide (MAG-OX) 400 (240 Mg)  capsule by mouth 2 times daily 6/8/21  Yes Provider, MD Doretha         Current Facility-Administered Medications:     furosemide (LASIX) injection 80 mg, 80 mg, IntraVENous, TID, Bhupendra Hayden MD, 80 mg at 08/17/24 1352    gabapentin (NEURONTIN) capsule 400 mg, 400 mg, Oral, TID, Bhupendra Hayden MD, 400 mg at 08/17/24 1352    oxyCODONE HCl (OXY-IR) immediate release tablet 10 mg, 10 mg, Oral, q8h, Ed Pino MD    Petrolatum-Zinc Oxide ointment, , Topical, BID, Ed Pino MD, Given at 08/17/24 1028    sodium zirconium cyclosilicate (LOKELMA) oral suspension 10 g, 10 g, Oral, Once, Bhupendra Hayden MD    sodium chloride flush 0.9 % injection 5-40 mL, 5-40 mL, IntraVENous, 2 times per day, Kitty Phelps APRN - CNP, 10 mL at 08/16/24 2151    sodium chloride flush 0.9 % injection 5-40 mL, 5-40 mL, IntraVENous, PRN, Kitty Phelps APRN - CNP    0.9 % sodium chloride infusion, , IntraVENous, PRN, Kitty Phelps APRN - CNP    potassium chloride (KLOR-CON M) extended release tablet 40 mEq, 40 mEq, Oral, PRN **OR** potassium bicarb-citric acid (EFFER-K) effervescent tablet 40 mEq, 40 mEq, Oral, PRN **OR** potassium chloride 10 mEq/100 mL IVPB (Peripheral Line), 10 mEq, IntraVENous, PRN, Kitty Phelps APRN - CNP    magnesium sulfate 2000 mg in 50 mL IVPB premix, 2,000 mg, IntraVENous, PRN, Kitty Phelps APRN - CNP    ondansetron (ZOFRAN-ODT) disintegrating tablet 4 mg, 4 mg, Oral, Q8H PRN **OR** ondansetron (ZOFRAN) injection 4 mg, 4 mg, IntraVENous, Q6H PRN, Kitty Phelps APRN - CNP    polyethylene glycol (GLYCOLAX) packet 17 g, 17 g, Oral, Daily PRN, Kitty Phelps APRN - CNP    acetaminophen (TYLENOL) tablet 650 mg, 650 mg, Oral, Q6H PRN, 650 mg at 08/16/24 2066 **OR** acetaminophen (TYLENOL) suppository 650 mg, 650 mg, Rectal, Q6H PRN, Kitty Phelps APRN - CNP    linezolid (ZYVOX) tablet 600 mg, 600 mg, Oral, 2 times per day, Kitty Phelps APRN - FADIA, 600 mg at  250 mg at 08/16/24 1812    insulin glargine (LANTUS) injection vial 32 Units, 32 Units, SubCUTAneous, Nightly, Lenin Kitty M, APRN - CNP, 32 Units at 08/16/24 2147    insulin glargine (LANTUS) injection vial 80 Units, 80 Units, SubCUTAneous, Daily, LeninKitty, APRN - CNP, 80 Units at 08/17/24 1014    insulin lispro (HUMALOG,ADMELOG) injection vial 0-16 Units, 0-16 Units, SubCUTAneous, TID WC, Kitty Phelps, APRN - CNP, 15 Units at 08/17/24 1345    insulin lispro (HUMALOG,ADMELOG) injection vial 0-4 Units, 0-4 Units, SubCUTAneous, Nightly, Kitty Phelps, APRN - CNP, 4 Units at 08/16/24 2148    azelastine (ASTELIN) 0.1 % nasal spray 2 spray, 2 spray, Each Nostril, BID, Ed Pino MD    LAB DATA REVIEWED:    Recent Results (from the past 24 hour(s))   Urinalysis    Collection Time: 08/16/24  6:00 PM   Result Value Ref Range    Color, UA Yellow/Straw      Appearance Clear Clear      Specific Gravity, UA 1.014 1.003 - 1.030      pH, Urine 5.0 5.0 - 8.0      Protein, UA Negative Negative mg/dL    Glucose, Ur Negative Negative mg/dL    Ketones, Urine Negative Negative mg/dL    Bilirubin, Urine Negative Negative      Blood, Urine Negative Negative      Urobilinogen, Urine 0.1 0.1 - 1.0 EU/dL    Nitrite, Urine Negative Negative      Leukocyte Esterase, Urine Moderate (A) Negative     Urinalysis, Micro    Collection Time: 08/16/24  6:00 PM   Result Value Ref Range    WBC, UA 0-4 0 - 4 /hpf    RBC, UA 0-5 0 - 5 /hpf    Epithelial Cells, UA Few Few /lpf    BACTERIA, URINE Negative Negative /hpf    Mucus, UA Trace (A) Negative /lpf   POCT Glucose    Collection Time: 08/16/24  6:03 PM   Result Value Ref Range    POC Glucose 247 (H) 65 - 100 mg/dL    Performed by: Neymar Schumacher  Tier 2    POCT Glucose    Collection Time: 08/16/24  9:06 PM   Result Value Ref Range    POC Glucose 342 (H) 65 - 100 mg/dL    Performed by: [employer]    Renal Function Panel    Collection Time: 08/17/24  3:58 AM   Result

## 2024-08-17 NOTE — PROGRESS NOTES
and in no apparent distress.  He endorses nausea and shortness of breath.  Still has cough but improving.  Also mention pain in his legs.He denies chest pain, palpitations, fever, headache, abdominal pain and diarrhea.  On 3 L nasal cannula    Vitals-/91 pulse 83 temperature 97.7 respirations 18 O2 99%    Labs-BUN 82, creatinine 3.44, GFR 18, glucose 308  Urine analysis demonstrates moderate traces of leukocyte esterase        Past Medical History:   Diagnosis Date    Arthritis     CAD (coronary artery disease)     Chronic obstructive pulmonary disease (HCC)     Diabetes (HCC)     Gout     Hypertension     Ill-defined condition     Sleep apnea     cpap        Past Surgical History:   Procedure Laterality Date    FOOT DEBRIDEMENT Right 12/15/2023    INCISION AND DRAINAGE RIGHT FIRST METATARSAL PHALANGEAL JOINT performed by Polo Oviedo DPM at [facility] MAIN OR    FOOT SURGERY      right heel - foriegn object    HX VEIN ABLATION ADHESIVE      ORTHOPEDIC SURGERY      back surgery    PACEMAKER      aicd -       Social History     Tobacco Use    Smoking status: Never    Smokeless tobacco: Never   Substance Use Topics    Alcohol use: Not Currently        Family History   Problem Relation Age of Onset    Diabetes Mother     Heart Disease Father     Hypertension Father     Diabetes Sister     Diabetes Brother     Hypertension Mother     Heart Disease Mother     Kidney Disease Mother        Allergies   Allergen Reactions    Amitriptyline Angioedema    Naproxen      Other reaction(s): Unknown (comments)  Pt not sure of reaction    Sulfa Antibiotics Nausea And Vomiting        Prior to Admission medications    Medication Sig Start Date End Date Taking? Authorizing Provider   azelastine (ASTELIN) 0.1 % nasal spray 2 sprays by Nasal route 2 times daily Use in each nostril as directed   Yes Provider, MD Doretha   benzocaine-menthol (CEPACOL SORE THROAT) 15-3.6 MG lozenge Take 1 lozenge by mouth every 2 hours as needed for  Bhupendra Hayden MD, 400 mg at 08/16/24 2148    insulin glargine (LANTUS) injection vial 32 Units, 32 Units, SubCUTAneous, Nightly, Kitty Phelps APRN - CNP, 32 Units at 08/16/24 2147    insulin glargine (LANTUS) injection vial 80 Units, 80 Units, SubCUTAneous, Daily, Kitty Phelps APRN - CNP, 80 Units at 08/16/24 0901    insulin lispro (HUMALOG,ADMELOG) injection vial 0-16 Units, 0-16 Units, SubCUTAneous, TID WC, Kitty Phelps, APRN - CNP, 8 Units at 08/16/24 1813    insulin lispro (HUMALOG,ADMELOG) injection vial 0-4 Units, 0-4 Units, SubCUTAneous, Nightly, Kitty Phelps, ROBINA - CNP, 4 Units at 08/16/24 2148    oxyCODONE (ROXICODONE) immediate release tablet 5 mg, 5 mg, Oral, Q8H PRN, Kitty Phelps APRN - CNP, 5 mg at 08/16/24 1818    azelastine (ASTELIN) 0.1 % nasal spray 2 spray, 2 spray, Each Nostril, BID, Ed Pino MD    LAB DATA REVIEWED:    Recent Results (from the past 24 hour(s))   D-Dimer, Quantitative    Collection Time: 08/16/24 10:59 AM   Result Value Ref Range    D-Dimer, Quant 2.97 (H) <0.50 ug/ml(FEU)   C-Reactive Protein    Collection Time: 08/16/24 10:59 AM   Result Value Ref Range    CRP 22.10 (H) 0.00 - 0.30 mg/dL   Lactate Dehydrogenase    Collection Time: 08/16/24 10:59 AM   Result Value Ref Range     85 - 241 U/L   Ferritin    Collection Time: 08/16/24 10:59 AM   Result Value Ref Range    Ferritin 292 26 - 388 ng/mL   Procalcitonin    Collection Time: 08/16/24 10:59 AM   Result Value Ref Range    Procalcitonin 0.42 (H) 0 ng/mL   CBC with Auto Differential    Collection Time: 08/16/24 10:59 AM   Result Value Ref Range    WBC 4.8 4.1 - 11.1 K/uL    RBC 2.99 (L) 4.10 - 5.70 M/uL    Hemoglobin 8.9 (L) 12.1 - 17.0 g/dL    Hematocrit 28.4 (L) 36.6 - 50.3 %    MCV 95.0 80.0 - 99.0 FL    MCH 29.8 26.0 - 34.0 PG    MCHC 31.3 30.0 - 36.5 g/dL    RDW 19.4 (H) 11.5 - 14.5 %    Platelets 217 150 - 400 K/uL    MPV 9.6 8.9 - 12.9 FL    Nucleated RBCs 1.7 (H) 0.0   Nasir Sebastian           Review of Systems:  Constitutional: Negative for chills and fever.   HENT: Negative.    Eyes: Negative.    Respiratory: Negative.    Cardiovascular: Negative.    Gastrointestinal: Negative for abdominal pain and nausea.   Skin: Negative.    Neurological: Negative.      Objective:   VITALS:    Vitals:    08/17/24 0256   BP: (!) 156/90   Pulse: 80   Resp: 18   Temp: 97.7 °F (36.5 °C)   SpO2: 97%       Physical Exam:   Constitutional: pt is oriented to person, place, and time.   HENT:   Head: Normocephalic and atraumatic.   Eyes: Pupils are equal, round, and reactive to light. EOM are normal.   Cardiovascular: Normal rate, regular rhythm and normal heart sounds.   Pulmonary/Chest: Breath sounds normal. No wheezes. No rales.   Exhibits no tenderness.   Abdominal: Soft. Bowel sounds are normal. There is no abdominal tenderness. There is no rebound and no guarding.   Musculoskeletal: Normal range of motion.   Neurological: pt is alert and oriented to person, place, and time. Alert. Normal strength. No cranial nerve deficit or sensory deficit. Displays a negative Romberg sign.        ASSESSMENT & PLAN number melatonin    COVID-19  Volume/fluid overload  Diabetes  COPD  Anemia  Hyperkalemia  Acute CHF  Respiratory acidosis with metabolic alkalosis  Acute kidney injury on chronic kidney disease stage III  Sleep apnea  Hypertension  Arthritis  Chronic stasis ulcers bilaterally legs    Vitamin C 1000 mg oral twice daily  Aspirin 81 Mg oral daily  Lipitor 40 Mg oral nightly  Astelin 0.1% nasal spray 2 spray each nostril 2 times daily  Azithromycin 250 mg oral Daily  B complex vitamin capsule oral daily  Symbicort 80-4.5 inhalation 2 puff  Spiriva Respimat 2.5 mcg/ACT inhaler 2 puff daily  Tums 500 mg oral 2 times daily  Zyrtec 10 mg oral nightly  Decadron 6 Mg oral daily  Iron tablet 325 mg oral Daily  Diflucan 200 mg oral Daily  Flonase 50 mm CG/ACT nasal spray 2 spray each nostril daily  Lasix injection  60 mg intervenous 3 times daily  Neurontin 400 mg oral twice daily  Lantus injection 32 units subcutaneous nightly  Lantus injection 80 units subcutaneous daily  Lidocaine 4% external patch every 12 hours daily.  Zyvox 600 mg oral every 12 hours  Mag-ox 400 Mg oral daily  Melatonin 3 mg nightly  Nystatin 500,000 units oral 4 times daily  Protonix 40 Mg oral daily  SENOKOT 8.6 mg oral Daily  Flomax 0.8 Mg oral daily  Zinc sulfate 50 mg oral Daily    COVID-19 detected on PCR    EKG demonstrates ventricular paced rhythm    Chest x-ray performed 8/15-cardiomegaly and mild pulmonary edema    Consulted nephrology 8/15    Consulted infectious disease 8/15-  Check CRP, procal, LDH and ferritin  Continue Linezolid for now    Consulted cardiology 8/16-may require echocardiogram for further management as patient is clinically stable for me to expose other team member in the hospital to proceed with echocardiogram as it is not deemed necessary based on my clinical assessment. Optimize guideline directed medical management for cardiomyopathy    ________________________________________________________________________    Signed: Dragan Levi

## 2024-08-18 LAB
ALBUMIN SERPL-MCNC: 2.4 G/DL (ref 3.5–5)
ANION GAP SERPL CALC-SCNC: 7 MMOL/L (ref 5–15)
BUN SERPL-MCNC: 96 MG/DL (ref 6–20)
BUN/CREAT SERPL: 28 (ref 12–20)
CA-I BLD-MCNC: 8.7 MG/DL (ref 8.5–10.1)
CHLORIDE SERPL-SCNC: 103 MMOL/L (ref 97–108)
CO2 SERPL-SCNC: 25 MMOL/L (ref 21–32)
CREAT SERPL-MCNC: 3.39 MG/DL (ref 0.7–1.3)
GLUCOSE BLD STRIP.AUTO-MCNC: 234 MG/DL (ref 65–100)
GLUCOSE BLD STRIP.AUTO-MCNC: 283 MG/DL (ref 65–100)
GLUCOSE BLD STRIP.AUTO-MCNC: 290 MG/DL (ref 65–100)
GLUCOSE BLD STRIP.AUTO-MCNC: 313 MG/DL (ref 65–100)
GLUCOSE BLD STRIP.AUTO-MCNC: 326 MG/DL (ref 65–100)
GLUCOSE BLD STRIP.AUTO-MCNC: 378 MG/DL (ref 65–100)
GLUCOSE BLD STRIP.AUTO-MCNC: 386 MG/DL (ref 65–100)
GLUCOSE SERPL-MCNC: 255 MG/DL (ref 65–100)
PERFORMED BY:: ABNORMAL
PHOSPHATE SERPL-MCNC: 3.6 MG/DL (ref 2.6–4.7)
POTASSIUM SERPL-SCNC: 5.3 MMOL/L (ref 3.5–5.1)
SODIUM SERPL-SCNC: 135 MMOL/L (ref 136–145)

## 2024-08-18 PROCEDURE — 6360000002 HC RX W HCPCS: Performed by: INTERNAL MEDICINE

## 2024-08-18 PROCEDURE — 97116 GAIT TRAINING THERAPY: CPT

## 2024-08-18 PROCEDURE — 6370000000 HC RX 637 (ALT 250 FOR IP): Performed by: INTERNAL MEDICINE

## 2024-08-18 PROCEDURE — 2700000000 HC OXYGEN THERAPY PER DAY

## 2024-08-18 PROCEDURE — 6370000000 HC RX 637 (ALT 250 FOR IP): Performed by: NURSE PRACTITIONER

## 2024-08-18 PROCEDURE — 36415 COLL VENOUS BLD VENIPUNCTURE: CPT

## 2024-08-18 PROCEDURE — 6370000000 HC RX 637 (ALT 250 FOR IP): Performed by: FAMILY MEDICINE

## 2024-08-18 PROCEDURE — 94761 N-INVAS EAR/PLS OXIMETRY MLT: CPT

## 2024-08-18 PROCEDURE — 82962 GLUCOSE BLOOD TEST: CPT

## 2024-08-18 PROCEDURE — 2580000003 HC RX 258: Performed by: NURSE PRACTITIONER

## 2024-08-18 PROCEDURE — 2580000003 HC RX 258: Performed by: FAMILY MEDICINE

## 2024-08-18 PROCEDURE — 6360000002 HC RX W HCPCS: Performed by: FAMILY MEDICINE

## 2024-08-18 PROCEDURE — 2060000000 HC ICU INTERMEDIATE R&B

## 2024-08-18 PROCEDURE — 97161 PT EVAL LOW COMPLEX 20 MIN: CPT

## 2024-08-18 PROCEDURE — 6360000002 HC RX W HCPCS: Performed by: NURSE PRACTITIONER

## 2024-08-18 PROCEDURE — 94640 AIRWAY INHALATION TREATMENT: CPT

## 2024-08-18 PROCEDURE — 80069 RENAL FUNCTION PANEL: CPT

## 2024-08-18 RX ORDER — SODIUM POLYSTYRENE SULFONATE 15 G/60ML
30 SUSPENSION ORAL; RECTAL ONCE
Status: COMPLETED | OUTPATIENT
Start: 2024-08-18 | End: 2024-08-18

## 2024-08-18 RX ORDER — HYDRALAZINE HYDROCHLORIDE 10 MG/1
10 TABLET, FILM COATED ORAL EVERY 8 HOURS SCHEDULED
Status: DISCONTINUED | OUTPATIENT
Start: 2024-08-18 | End: 2024-08-24 | Stop reason: HOSPADM

## 2024-08-18 RX ORDER — ISOSORBIDE DINITRATE 5 MG/1
20 TABLET ORAL 3 TIMES DAILY
Status: DISCONTINUED | OUTPATIENT
Start: 2024-08-18 | End: 2024-08-24 | Stop reason: HOSPADM

## 2024-08-18 RX ADMIN — HYDRALAZINE HYDROCHLORIDE 10 MG: 10 TABLET ORAL at 13:02

## 2024-08-18 RX ADMIN — GABAPENTIN 400 MG: 400 CAPSULE ORAL at 13:01

## 2024-08-18 RX ADMIN — LINEZOLID 600 MG: 600 TABLET, FILM COATED ORAL at 21:41

## 2024-08-18 RX ADMIN — PANTOPRAZOLE SODIUM 40 MG: 40 TABLET, DELAYED RELEASE ORAL at 06:25

## 2024-08-18 RX ADMIN — INSULIN LISPRO 16 UNITS: 100 INJECTION, SOLUTION INTRAVENOUS; SUBCUTANEOUS at 15:54

## 2024-08-18 RX ADMIN — OXYCODONE HYDROCHLORIDE AND ACETAMINOPHEN 1000 MG: 500 TABLET ORAL at 08:48

## 2024-08-18 RX ADMIN — Medication 1 LOZENGE: at 23:18

## 2024-08-18 RX ADMIN — Medication 1 LOZENGE: at 07:28

## 2024-08-18 RX ADMIN — ASPIRIN 81 MG CHEWABLE TABLET 81 MG: 81 TABLET CHEWABLE at 08:46

## 2024-08-18 RX ADMIN — INSULIN GLARGINE 32 UNITS: 100 INJECTION, SOLUTION SUBCUTANEOUS at 21:40

## 2024-08-18 RX ADMIN — INSULIN LISPRO 4 UNITS: 100 INJECTION, SOLUTION INTRAVENOUS; SUBCUTANEOUS at 23:53

## 2024-08-18 RX ADMIN — SODIUM CHLORIDE, PRESERVATIVE FREE 10 ML: 5 INJECTION INTRAVENOUS at 21:45

## 2024-08-18 RX ADMIN — NYSTATIN 500000 UNITS: 100000 SUSPENSION ORAL at 08:45

## 2024-08-18 RX ADMIN — WHITE PETROLATUM,ZINC OXIDE: 57; 17 PASTE TOPICAL at 21:56

## 2024-08-18 RX ADMIN — FLUCONAZOLE 200 MG: 100 TABLET ORAL at 08:48

## 2024-08-18 RX ADMIN — GABAPENTIN 400 MG: 400 CAPSULE ORAL at 21:40

## 2024-08-18 RX ADMIN — CEFTRIAXONE SODIUM 1000 MG: 1 INJECTION, POWDER, FOR SOLUTION INTRAMUSCULAR; INTRAVENOUS at 16:24

## 2024-08-18 RX ADMIN — Medication 1 MG: at 08:50

## 2024-08-18 RX ADMIN — FUROSEMIDE 60 MG: 10 INJECTION, SOLUTION INTRAMUSCULAR; INTRAVENOUS at 08:51

## 2024-08-18 RX ADMIN — Medication 1 CAPSULE: at 08:47

## 2024-08-18 RX ADMIN — INSULIN GLARGINE 80 UNITS: 100 INJECTION, SOLUTION SUBCUTANEOUS at 08:32

## 2024-08-18 RX ADMIN — Medication 2 PUFF: at 08:15

## 2024-08-18 RX ADMIN — SENNOSIDES 8.6 MG: 8.6 TABLET, FILM COATED ORAL at 08:47

## 2024-08-18 RX ADMIN — LINEZOLID 600 MG: 600 TABLET, FILM COATED ORAL at 08:50

## 2024-08-18 RX ADMIN — OXYCODONE HYDROCHLORIDE 10 MG: 10 TABLET ORAL at 14:36

## 2024-08-18 RX ADMIN — FERROUS SULFATE TAB 325 MG (65 MG ELEMENTAL FE) 325 MG: 325 (65 FE) TAB at 08:48

## 2024-08-18 RX ADMIN — ATORVASTATIN CALCIUM 40 MG: 40 TABLET, FILM COATED ORAL at 21:41

## 2024-08-18 RX ADMIN — Medication 3 MG: at 21:40

## 2024-08-18 RX ADMIN — Medication 2 PUFF: at 08:06

## 2024-08-18 RX ADMIN — FLUTICASONE PROPIONATE 2 SPRAY: 50 SPRAY, METERED NASAL at 08:51

## 2024-08-18 RX ADMIN — ISOSORBIDE DINITRATE 20 MG: 5 TABLET ORAL at 13:01

## 2024-08-18 RX ADMIN — Medication 400 UNITS: at 08:49

## 2024-08-18 RX ADMIN — Medication 400 UNITS: at 21:40

## 2024-08-18 RX ADMIN — SODIUM POLYSTYRENE SULFONATE 30 G: 15 SUSPENSION ORAL; RECTAL at 09:37

## 2024-08-18 RX ADMIN — Medication 400 MG: at 08:48

## 2024-08-18 RX ADMIN — INSULIN LISPRO 4 UNITS: 100 INJECTION, SOLUTION INTRAVENOUS; SUBCUTANEOUS at 08:32

## 2024-08-18 RX ADMIN — HYDRALAZINE HYDROCHLORIDE 10 MG: 10 TABLET ORAL at 21:40

## 2024-08-18 RX ADMIN — HYOSCYAMINE SULFATE: 16 SOLUTION at 11:12

## 2024-08-18 RX ADMIN — ZINC SULFATE 220 MG (50 MG) CAPSULE 50 MG: CAPSULE at 08:48

## 2024-08-18 RX ADMIN — OXYCODONE HYDROCHLORIDE 10 MG: 10 TABLET ORAL at 06:25

## 2024-08-18 RX ADMIN — CETIRIZINE HYDROCHLORIDE 10 MG: 10 TABLET, FILM COATED ORAL at 21:41

## 2024-08-18 RX ADMIN — INSULIN LISPRO 8 UNITS: 100 INJECTION, SOLUTION INTRAVENOUS; SUBCUTANEOUS at 12:06

## 2024-08-18 RX ADMIN — DEXAMETHASONE 6 MG: 4 TABLET ORAL at 08:49

## 2024-08-18 RX ADMIN — OXYCODONE HYDROCHLORIDE 10 MG: 10 TABLET ORAL at 23:19

## 2024-08-18 RX ADMIN — CALCIUM CARBONATE 500 MG: 500 TABLET, CHEWABLE ORAL at 08:45

## 2024-08-18 RX ADMIN — OXYCODONE HYDROCHLORIDE AND ACETAMINOPHEN 1000 MG: 500 TABLET ORAL at 21:40

## 2024-08-18 RX ADMIN — GABAPENTIN 400 MG: 400 CAPSULE ORAL at 08:48

## 2024-08-18 RX ADMIN — ISOSORBIDE DINITRATE 20 MG: 5 TABLET ORAL at 16:23

## 2024-08-18 RX ADMIN — AZITHROMYCIN DIHYDRATE 250 MG: 500 TABLET ORAL at 16:23

## 2024-08-18 RX ADMIN — TAMSULOSIN HYDROCHLORIDE 0.8 MG: 0.4 CAPSULE ORAL at 08:48

## 2024-08-18 RX ADMIN — WHITE PETROLATUM,ZINC OXIDE: 57; 17 PASTE TOPICAL at 11:13

## 2024-08-18 RX ADMIN — CALCIUM CARBONATE 500 MG: 500 TABLET, CHEWABLE ORAL at 21:40

## 2024-08-18 RX ADMIN — INSULIN LISPRO 16 UNITS: 100 INJECTION, SOLUTION INTRAVENOUS; SUBCUTANEOUS at 17:18

## 2024-08-18 RX ADMIN — Medication 2 PUFF: at 20:10

## 2024-08-18 ASSESSMENT — PAIN DESCRIPTION - DESCRIPTORS
DESCRIPTORS: ACHING
DESCRIPTORS: OTHER (COMMENT)
DESCRIPTORS: SORE
DESCRIPTORS: OTHER (COMMENT);SORE
DESCRIPTORS: ACHING

## 2024-08-18 ASSESSMENT — PAIN DESCRIPTION - LOCATION
LOCATION: GENERALIZED
LOCATION: THROAT
LOCATION: GENERALIZED
LOCATION: THROAT

## 2024-08-18 ASSESSMENT — PAIN DESCRIPTION - ORIENTATION
ORIENTATION: OTHER (COMMENT)
ORIENTATION: OTHER (COMMENT)

## 2024-08-18 ASSESSMENT — PAIN SCALES - GENERAL
PAINLEVEL_OUTOF10: 5
PAINLEVEL_OUTOF10: 0
PAINLEVEL_OUTOF10: 8
PAINLEVEL_OUTOF10: 3
PAINLEVEL_OUTOF10: 8
PAINLEVEL_OUTOF10: 0
PAINLEVEL_OUTOF10: 8

## 2024-08-18 NOTE — PROGRESS NOTES
Nephrology follow up          Patient: Shantanu Casillas MRN: [id number]  SSN: [id number]    YOB: 1955  Age: 68 y.o.  Sex: male      Subjective:   The pt is seen in the room.  Blood pressures are good  Afebrile  On nasal cannula 3 L  Resolving lower extremity swelling  Resolving DEMARCO  Potassium 5.2  Past Medical History:   Diagnosis Date    Arthritis     CAD (coronary artery disease)     Chronic obstructive pulmonary disease (HCC)     Diabetes (HCC)     Gout     Hypertension     Ill-defined condition     Sleep apnea     cpap     Past Surgical History:   Procedure Laterality Date    FOOT DEBRIDEMENT Right 12/15/2023    INCISION AND DRAINAGE RIGHT FIRST METATARSAL PHALANGEAL JOINT performed by Polo Oviedo DPM at [facility] MAIN OR    FOOT SURGERY      right heel - foriegn object    HX VEIN ABLATION ADHESIVE      ORTHOPEDIC SURGERY      back surgery    PACEMAKER      aicd -      Family History   Problem Relation Age of Onset    Diabetes Mother     Heart Disease Father     Hypertension Father     Diabetes Sister     Diabetes Brother     Hypertension Mother     Heart Disease Mother     Kidney Disease Mother      Social History     Tobacco Use    Smoking status: Never    Smokeless tobacco: Never   Substance Use Topics    Alcohol use: Not Currently      Current Facility-Administered Medications   Medication Dose Route Frequency Provider Last Rate Last Admin    furosemide (LASIX) injection 80 mg  80 mg IntraVENous TID Bhupendra Hayden MD   80 mg at 08/17/24 1352    gabapentin (NEURONTIN) capsule 400 mg  400 mg Oral TID Bhupendra Hayden MD   400 mg at 08/17/24 1352    oxyCODONE HCl (OXY-IR) immediate release tablet 10 mg  10 mg Oral q8h Ed Pino MD   10 mg at 08/17/24 1457    Petrolatum-Zinc Oxide ointment   Topical BID Ed Pino MD   Given at 08/17/24 1028    sodium zirconium cyclosilicate (LOKELMA) oral suspension 10 g  10 g Oral Once Bhupendra Hayden MD        sodium chloride flush 0.9 %  compensation in the setting of chronic respiratory acidosis  CO2 27  I will discontinue oral sodium bicarbonate.    Hypertension  Good blood pressure.  On no Bp meds.    Anemia  Anemia of chronic kidney disease  Iron deficiency  Hemoglobin 8.5->9.7.  Subcu erythropoietin weekly  Continue oral iron sulfate        Plan:       Signed By: Bhupendra Hayden MD     August 17, 2024

## 2024-08-18 NOTE — PLAN OF CARE
PHYSICAL THERAPY EVALUATION  Patient: Shantanu Casillas (68 y.o. male)  Date: 8/18/2024  Primary Diagnosis: DEMARCO (acute kidney injury) (HCC) [N17.9]  Fluid overload [E87.70]  Volume overload [E87.70]  Hypervolemia, unspecified hypervolemia type [E87.70]  COVID-19 [U07.1]       Precautions:       Recommendations for nursing mobility: Out of bed to chair for meals, Use of BSC for toileting , and Assist x1    In place during session: Nasal Cannula 3L, EKG/telemetry , and Pulse ox    ASSESSMENT  Pt is a 68 y.o. male admitted on 8/15/2024 for shorness of breath and LE edema; pt currently being treated for Covid-19, volume overload, acute CHF, respiratory acidosis and metabolic alkalosis. Pt awake sitting EOB upon PT arrival, agreeable to evaluation. Pt A&O x 4.     Based on the objective data described below, the patient currently presents with impaired functional mobility, decreased independence in ADLs, impaired ability to perform high-level IADLs, impaired strength, decreased activity tolerance, and impaired balance. (See below for objective details and assist levels).     Overall pt tolerated session well today with no reports of increased pain with activity (although does report 8/10 baseline level of pain that he reports is \"always there\") HR and SpO2 responded appropriately with activity. Pt required CGA for transfers. Pt amb 10 feet x 2 with rw, gt belt and CGA; demonstrates wide OH. Pt will benefit from continued skilled PT to address above deficits and return to PLOF. Potential barriers for safe discharge: pts current support system is unable to meet their requirements for physical assistance. Current PT DC recommendation Skilled Nursing Facility once medically appropriate as pt reports that his wife is unable to care for him at his current level of function/deconditioning. Recommendation may change pending improvements in activity tolerance during stay.      Start of Session End of Session   SPO2 (%) 98 99  is determined to be of the following complexity level: LOW    Pain Ratin/10 in B LE, neck and back  Pain Intervention(s):   patient medicated for pain prior to session and pain is at a level acceptable to the patient    Activity Tolerance:   Fair     After treatment patient left in no apparent distress:   Bed locked and in lowest position Patient left in no apparent distress in bed and Call bell within reach and nsg updated.    COMMUNICATION/EDUCATION:   The patient’s plan of care was discussed with: Registered nurse     Patient Education  Education Given To: Patient  Education Provided: Role of Therapy;Plan of Care  Education Method: Verbal  Barriers to Learning: None  Education Outcome: Verbalized understanding      Thank you for this referral.  Kennedi Scott, PT, DPT  Minutes: 35

## 2024-08-18 NOTE — PROGRESS NOTES
Nephrology follow up          Patient: Shantanu Casillas MRN: [id number]  SSN: [id number]    YOB: 1955  Age: 68 y.o.  Sex: male      Subjective:   The pt is seen in the room.  Blood pressures are good  Afebrile  On nasal cannula 3 L  Resolving lower extremity swelling  Worse DEMARCO  Potassium 5.2  Past Medical History:   Diagnosis Date    Arthritis     CAD (coronary artery disease)     Chronic obstructive pulmonary disease (HCC)     Diabetes (HCC)     Gout     Hypertension     Ill-defined condition     Sleep apnea     cpap     Past Surgical History:   Procedure Laterality Date    FOOT DEBRIDEMENT Right 12/15/2023    INCISION AND DRAINAGE RIGHT FIRST METATARSAL PHALANGEAL JOINT performed by Polo Oviedo DPM at [facility] MAIN OR    FOOT SURGERY      right heel - foriegn object    HX VEIN ABLATION ADHESIVE      ORTHOPEDIC SURGERY      back surgery    PACEMAKER      aicd -      Family History   Problem Relation Age of Onset    Diabetes Mother     Heart Disease Father     Hypertension Father     Diabetes Sister     Diabetes Brother     Hypertension Mother     Heart Disease Mother     Kidney Disease Mother      Social History     Tobacco Use    Smoking status: Never    Smokeless tobacco: Never   Substance Use Topics    Alcohol use: Not Currently      Current Facility-Administered Medications   Medication Dose Route Frequency Provider Last Rate Last Admin    hydrALAZINE (APRESOLINE) tablet 10 mg  10 mg Oral 3 times per day Ethan Barker MD   10 mg at 08/18/24 1302    isosorbide dinitrate (ISORDIL) tablet 20 mg  20 mg Oral TID tEhan Barker MD   20 mg at 08/18/24 1301    gabapentin (NEURONTIN) capsule 400 mg  400 mg Oral TID Bhupendra Hayden MD   400 mg at 08/18/24 1301    oxyCODONE HCl (OXY-IR) immediate release tablet 10 mg  10 mg Oral q8h Ed Pino MD   10 mg at 08/18/24 1436    [Held by provider] furosemide (LASIX) injection 60 mg  60 mg IntraVENous Daily Bhupendra Hayden MD   60 mg at 08/18/24  patch 1 patch  1 patch TransDERmal Daily Peg PhelpsROBINA Campuzano - CNP   1 patch at 08/18/24 0851    magnesium oxide (MAG-OX) tablet 400 mg  400 mg Oral Daily King KittyROBINA Agustin - CNP   400 mg at 08/18/24 0848    melatonin tablet 3 mg  3 mg Oral Nightly Jayde PhelpsROBINA Agustin - CNP   3 mg at 08/17/24 2208    Virt-Caps 1 mg  1 capsule Oral QA LeninLandyKitty ROBINA - CNP   1 mg at 08/18/24 0850    nystatin (MYCOSTATIN) 764173 UNIT/ML suspension 500,000 Units  500,000 Units Oral 4x Daily PC & HS Jayde PhelpsROBINA Agustin - CNP   500,000 Units at 08/18/24 0845    pantoprazole (PROTONIX) tablet 40 mg  40 mg Oral QAM AC King KittyROBINA Agustin - CNP   40 mg at 08/18/24 0625    senna (SENOKOT) tablet 8.6 mg  1 tablet Oral Daily King Kitty ROBINA - CNP   8.6 mg at 08/18/24 0847    simethicone (MYLICON) chewable tablet 80 mg  80 mg Oral Q6H PRN Kitty Phelps APRN - CNP        Sodium Hypochlorite (DAKINS) 0.25 % half strength external soln   Irrigation Daily King KittyROBINA Agustin - CNP   Given at 08/18/24 1112    sorbitol 70 % liquid 30 mL  30 mL Oral Daily PRN Kitty Phelps APRN - CNP        tamsulosin (FLOMAX) capsule 0.8 mg  0.8 mg Oral Daily King KittyROBINA Agustin - CNP   0.8 mg at 08/18/24 0848    vitamin E capsule 400 Units  400 Units Oral BID King KittyROBINA Agustin - CNP   400 Units at 08/18/24 0849    zinc sulfate (ZINCATE) 220 mg capsule - elemental zinc 50 mg  50 mg Oral Daily King KittyROBINA Agustin - CNP   50 mg at 08/18/24 0848    dexAMETHasone (DECADRON) tablet 6 mg  6 mg Oral Daily Kitty Phelps APRN - CNP   6 mg at 08/18/24 0849    azithromycin (ZITHROMAX) tablet 250 mg  250 mg Oral Daily Kitty Phelps APRN - CNP   250 mg at 08/17/24 1724    insulin glargine (LANTUS) injection vial 32 Units  32 Units SubCUTAneous Nightly Kitty Phelps APRN - CNP   32 Units at 08/17/24 2207    insulin glargine (LANTUS) injection vial 80 Units  80 Units SubCUTAneous Daily Kitty Phelps

## 2024-08-18 NOTE — PROGRESS NOTES
Progress Note      8/18/2024 11:51 AM  NAME: Shantanu Casillas   MRN:  [id number]   Admit Diagnosis: DEMARCO (acute kidney injury) (HCC) [N17.9]  Fluid overload [E87.70]  Volume overload [E87.70]  Hypervolemia, unspecified hypervolemia type [E87.70]  COVID-19 [U07.1]      Problem List:   -Admitted to the hospital with shortness of breath  -COVID-19 infection.  -COPD  -History of coronary artery disease details unavailable at this time  -History of congestive heart failure with last known ejection fraction of 40 to 45% as of 2023  -Sleep apnea  -Hypertension  -Diabetes mellitus  -Arthritis         Assessment/Plan:   Note dictated August 21, 2024  -Follow-up visit from cardiology.  He has moved from 4 W. to 4 E.  -Echocardiogram reveals mildly reduced ejection fraction of 35 to 40% which is further deterioration of his ejection fraction previously done.  -Based on my current clinical assessment I will be aggressively treating him for nonischemic cardiomyopathy and increase doses as tolerated per hemodynamics.  -We will continue to follow while patient is in the hospital along with the team and make further cardiovascular management decision as clinically warranted.           []       High complexity decision making was performed in this patient at high risk for decompensation with multiple organ involvement.    Subjective:     Shantanu Casillas is a 68 y.o. White (non-) male who who has history of cardiomyopathy the details of which is not available to me admitted to the hospital with shortness of breath and found to have COVID-19 infection.  When I saw the patient he was lying comfortably in the bed and denies any symptoms other than shortness of breath.  He has bilateral leg infection requiring dressing and antibiotics.  Telemetry monitoring reveals sinus rhythm.  Will check serial cardiac enzymes once okay to proceed with echocardiogram from ID as patient is clinically stable for me to expose other team  input(s): \"PH\", \"PCO2\", \"PO2\" in the last 72 hours.    Medications Personally Reviewed:    Current Facility-Administered Medications   Medication Dose Route Frequency    gabapentin (NEURONTIN) capsule 400 mg  400 mg Oral TID    oxyCODONE HCl (OXY-IR) immediate release tablet 10 mg  10 mg Oral q8h    furosemide (LASIX) injection 60 mg  60 mg IntraVENous Daily    Petrolatum-Zinc Oxide ointment   Topical BID    sodium zirconium cyclosilicate (LOKELMA) oral suspension 10 g  10 g Oral Once    sodium chloride flush 0.9 % injection 5-40 mL  5-40 mL IntraVENous 2 times per day    sodium chloride flush 0.9 % injection 5-40 mL  5-40 mL IntraVENous PRN    0.9 % sodium chloride infusion   IntraVENous PRN    potassium chloride (KLOR-CON M) extended release tablet 40 mEq  40 mEq Oral PRN    Or    potassium bicarb-citric acid (EFFER-K) effervescent tablet 40 mEq  40 mEq Oral PRN    Or    potassium chloride 10 mEq/100 mL IVPB (Peripheral Line)  10 mEq IntraVENous PRN    magnesium sulfate 2000 mg in 50 mL IVPB premix  2,000 mg IntraVENous PRN    ondansetron (ZOFRAN-ODT) disintegrating tablet 4 mg  4 mg Oral Q8H PRN    Or    ondansetron (ZOFRAN) injection 4 mg  4 mg IntraVENous Q6H PRN    polyethylene glycol (GLYCOLAX) packet 17 g  17 g Oral Daily PRN    acetaminophen (TYLENOL) tablet 650 mg  650 mg Oral Q6H PRN    Or    acetaminophen (TYLENOL) suppository 650 mg  650 mg Rectal Q6H PRN    linezolid (ZYVOX) tablet 600 mg  600 mg Oral 2 times per day    albuterol sulfate HFA (PROVENTIL;VENTOLIN;PROAIR) 108 (90 Base) MCG/ACT inhaler 1 puff  1 puff Inhalation Q4H PRN    ascorbic acid (VITAMIN C) tablet 1,000 mg  1,000 mg Oral BID    aspirin chewable tablet 81 mg  81 mg Oral Daily    atorvastatin (LIPITOR) tablet 40 mg  40 mg Oral Nightly    b complex vitamins capsule 1 capsule  1 capsule Oral Daily    Benzocaine-Menthol (CEPACOL) 1 lozenge  1 lozenge Oral Q2H PRN    calcium carbonate (TUMS) chewable tablet 500 mg  1 tablet Oral BID

## 2024-08-18 NOTE — PROGRESS NOTES
Pt glucose is 386. Notified provider and gave 16 units Humalog, will recheck glucose in an hour    1719: Repeat glucose is 378. Notified provider and gave 16 units Humalog per telephone readback. Recheck glucose in one hour and switch pt to Q4 accuchecks

## 2024-08-18 NOTE — PLAN OF CARE
Problem: Discharge Planning  Goal: Discharge to home or other facility with appropriate resources  Outcome: Progressing     Problem: Skin/Tissue Integrity  Goal: Absence of new skin breakdown  Description: 1.  Monitor for areas of redness and/or skin breakdown  2.  Assess vascular access sites hourly  3.  Every 4-6 hours minimum:  Change oxygen saturation probe site  4.  Every 4-6 hours:  If on nasal continuous positive airway pressure, respiratory therapy assess nares and determine need for appliance change or resting period.  8/18/2024 0742 by Alhaji Jay, RN  Outcome: Progressing  8/18/2024 0247 by Ирина De Anda RN  Outcome: Progressing     Problem: ABCDS Injury Assessment  Goal: Absence of physical injury  Outcome: Progressing     Problem: Safety - Adult  Goal: Free from fall injury  Outcome: Progressing     Problem: Respiratory - Adult  Goal: Achieves optimal ventilation and oxygenation  Outcome: Progressing     Problem: Cardiovascular - Adult  Goal: Maintains optimal cardiac output and hemodynamic stability  Outcome: Progressing  Goal: Absence of cardiac dysrhythmias or at baseline  Outcome: Progressing     Problem: Skin/Tissue Integrity - Adult  Goal: Incisions, wounds, or drain sites healing without S/S of infection  Outcome: Progressing     Problem: Infection - Adult  Goal: Absence of infection at discharge  Outcome: Progressing  Goal: Absence of infection during hospitalization  Outcome: Progressing  Goal: Absence of fever/infection during anticipated neutropenic period  Outcome: Progressing     Problem: Metabolic/Fluid and Electrolytes - Adult  Goal: Electrolytes maintained within normal limits  Outcome: Progressing  Goal: Hemodynamic stability and optimal renal function maintained  Outcome: Progressing     Problem: Pain  Goal: Verbalizes/displays adequate comfort level or baseline comfort level  Outcome: Progressing     Problem: Chronic Conditions and Co-morbidities  Goal: Patient's chronic  9/23/20 Patient: Reno Orodnez YOB: 1984 Date of Visit: 9/22/2020 Kezia Grimaldo NP 
[place] [address] VIA Facsimile: [phone] Kezia Grimaldo, Medical Student DIANNE Ruiz [address] [facility] [address] VIA In Basket Dear EMILE Doty, Medical Student, Thank you for referring Ms. Mohini Rice to [address] [place] [address] for evaluation. My notes for this consultation are attached. If you have questions, please do not hesitate to call me. I look forward to following your patient along with you. Sincerely, Luis Oquendo MD

## 2024-08-18 NOTE — PROGRESS NOTES
Progress Note      8/18/2024 12:02 PM  NAME: Shantanu Casillas   MRN:  [id number]   Admit Diagnosis: DEMARCO (acute kidney injury) (HCC) [N17.9]  Fluid overload [E87.70]  Volume overload [E87.70]  Hypervolemia, unspecified hypervolemia type [E87.70]  COVID-19 [U07.1]      Problem List:   -Admitted to the hospital with shortness of breath  -COVID-19 infection.  -COPD  -History of coronary artery disease details unavailable at this time  -History of congestive heart failure with last known ejection fraction of 40 to 45% as of 2023  -Sleep apnea  -Hypertension  -Diabetes mellitus  -Arthritis         Assessment/Plan:   Note dictated August 18, 2024  -Follow-up visit from cardiology as patient is a still in the hospital.  He has diagnosed with COVID-19 infection and receiving appropriate medical management.  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  -Patient patient current hemodynamic and clinical status I will take the liberty to start him on equal doses of BiDil and increase hydralazine to 35 mg 3 times a day and start as Sorbide dinitrate 3 times a day which is equal into the above mentioned medication and has shown improvement in ejection fraction.  -Continue to optimize guideline directed medical management for cardiomyopathy and we will follow while patient is in the hospital along with the team and obtain an echocardiogram when it is appropriate considering he has COVID.  -           []       High complexity decision making was performed in this patient at high risk for decompensation with multiple organ involvement.    Subjective:     Shantanu Casillas is a 68 y.o. White (non-) male who who has    Recent Labs     08/17/24  0358 08/17/24  1549 08/18/24  0648    135* 135*   K 4.7 5.2* 5.3*    101 103   CO2 29 27 25   BUN 82* 86* 96*     No results for input(s): \"CPK\" in the last 72 hours.    Invalid input(s): \"CPKMB\", \"CKNDX\", \"TROIQ\"  No results found for: \"CHOL\", \"CHLST\", \"CHOLV\", \"HDL\", \"HDLC\", \"LDL\"    Recent Labs     08/16/24  0104 08/16/24  1405 08/17/24  1549   GLOB 5.3* 4.8* 5.4*     No results for input(s): \"PH\", \"PCO2\", \"PO2\" in the last 72 hours.    Medications Personally Reviewed:    Current Facility-Administered Medications   Medication Dose Route Frequency    gabapentin (NEURONTIN) capsule 400 mg  400 mg Oral TID    oxyCODONE HCl (OXY-IR) immediate release tablet 10 mg  10 mg Oral q8h    [Held by provider] furosemide (LASIX) injection 60 mg  60 mg IntraVENous Daily    Petrolatum-Zinc Oxide ointment   Topical BID    sodium zirconium cyclosilicate (LOKELMA) oral suspension 10 g  10 g Oral Once    sodium chloride flush 0.9 % injection 5-40 mL  5-40 mL IntraVENous 2 times per day    sodium chloride flush 0.9 % injection 5-40 mL  5-40 mL IntraVENous PRN    0.9 % sodium chloride infusion   IntraVENous PRN    potassium chloride (KLOR-CON M) extended release tablet 40 mEq  40 mEq Oral PRN    Or    potassium bicarb-citric acid (EFFER-K) effervescent tablet 40 mEq  40 mEq Oral PRN    Or    potassium chloride 10 mEq/100 mL IVPB (Peripheral Line)  10 mEq IntraVENous PRN    magnesium sulfate 2000 mg in 50 mL IVPB premix  2,000 mg IntraVENous PRN    ondansetron (ZOFRAN-ODT) disintegrating tablet 4 mg  4 mg Oral Q8H PRN    Or    ondansetron (ZOFRAN) injection 4 mg  4 mg IntraVENous Q6H PRN    polyethylene glycol (GLYCOLAX) packet 17 g  17 g Oral Daily PRN    acetaminophen (TYLENOL) tablet 650 mg  650 mg Oral Q6H PRN    Or    acetaminophen (TYLENOL) suppository 650 mg  650 mg Rectal Q6H PRN    linezolid (ZYVOX) tablet 600 mg  600 mg Oral 2 times per day    albuterol sulfate HFA

## 2024-08-18 NOTE — PROGRESS NOTES
History and Physical    NAME:   Shantanu Casillas   :  1955   MRN:  [id number]     Date/Time: 2024 1:07 PM    Patient PCP: Ed Pino MD  ______________________________________________________________________       Subjective:     CHIEF COMPLAINT:   Shortness of breath      HISTORY OF PRESENT ILLNESS:     General Daily Progress Note  Patient is a 68 y.o. year old male with past medical history of arthritis, coronary artery disease, COPD, diabetes, gout, hypertension, sleep apnea presents to the ED with dyspnea. Patient reports he was diagnosed with COVID 1 week prior, and has been increasingly short of breath since then. Also states he was taken off of his diuretic and has had increasing swelling in the legs and abdomen. He has a productive cough, states he feels like he cannot \"get it out \". No nausea or vomiting. No fevers at home.       HPI-patient alert and in moderate distress.  He endorses shortness of breath, coughing, nausea without vomiting.  He denies chest pain, palpitations, fever, headache, abdominal pain and diarrhea.    Vitals-blood pressure 127/84 pulse 98 temperature 97.5 respirations 18 O2 97%    Labs-potassium 5.4, BUN 67, creatinine 3.38, GFR 19, glucose 248, BNP 3528, ammonia 43, hemoglobin 8.9, hematocrit 28.4, MCV 95, pH 7.32, pCO2 49, D-dimer 2.97    COVID-19 detected on PCR    EKG demonstrates ventricular paced rhythm    Chest x-ray performed 8/15-cardiomegaly and mild pulmonary edema    Consulted nephrology 8/15    Consulted infectious disease 8/15 -  Check CRP, procal, LDH and ferritin  Continue Linezolid for now    Consulted cardiology -may require echocardiogram for further management as patient is clinically stable for me to expose other team member in the hospital to proceed with echocardiogram as it is not deemed necessary based on my clinical assessment. Optimize guideline directed medical management for cardiomyopathy        HPI-patient was lying down  and in no apparent distress.  He endorses nausea and shortness of breath.  Still has cough but improving.  Also mention pain in his legs.He denies chest pain, palpitations, fever, headache, abdominal pain and diarrhea.  On 3 L nasal cannula    Vitals-/91 pulse 83 temperature 97.7 respirations 18 O2 99%    Labs-BUN 82, creatinine 3.44, GFR 18, glucose 308  Urine analysis demonstrates moderate traces of leukocyte esterase    Patient asking for scheduled pain medication    8/18  HPI-patient sitting upright in no apparent distress.  He claims his symptoms are overall improving.  He is on 3 L nasal cannula    Vitals-/73 pulse 76 temperature 97.7 respirations 18 O2 99%    Labs-potassium 5.3, BUN 96, creatinine 3.39, GFR 19, glucose 234    Respiratory culture 8/17-Gram stain demonstrates 1+ WBCs, 3+ gram-negative rods, occasional gram-positive cocci in pairs    Past Medical History:   Diagnosis Date    Arthritis     CAD (coronary artery disease)     Chronic obstructive pulmonary disease (HCC)     Diabetes (HCC)     Gout     Hypertension     Ill-defined condition     Sleep apnea     cpap        Past Surgical History:   Procedure Laterality Date    FOOT DEBRIDEMENT Right 12/15/2023    INCISION AND DRAINAGE RIGHT FIRST METATARSAL PHALANGEAL JOINT performed by Polo Oviedo DPM at [facility] MAIN OR    FOOT SURGERY      right heel - foriegn object    HX VEIN ABLATION ADHESIVE      ORTHOPEDIC SURGERY      back surgery    PACEMAKER      aicd -       Social History     Tobacco Use    Smoking status: Never    Smokeless tobacco: Never   Substance Use Topics    Alcohol use: Not Currently        Family History   Problem Relation Age of Onset    Diabetes Mother     Heart Disease Father     Hypertension Father     Diabetes Sister     Diabetes Brother     Hypertension Mother     Heart Disease Mother     Kidney Disease Mother        Allergies   Allergen Reactions    Amitriptyline Angioedema    Naproxen      Other reaction(s):  ROBINA Diaz CNP, Given at 08/18/24 1112    sorbitol 70 % liquid 30 mL, 30 mL, Oral, Daily PRN, Kitty Phelps APRN - CNP    tamsulosin (FLOMAX) capsule 0.8 mg, 0.8 mg, Oral, Daily, Kitty Phelps APRN - CNP, 0.8 mg at 08/18/24 0848    vitamin E capsule 400 Units, 400 Units, Oral, BID, Kitty Phelps APRN - CNP, 400 Units at 08/18/24 0849    zinc sulfate (ZINCATE) 220 mg capsule - elemental zinc 50 mg, 50 mg, Oral, Daily, Kitty Phelps APRN - CNP, 50 mg at 08/18/24 0848    dexAMETHasone (DECADRON) tablet 6 mg, 6 mg, Oral, Daily, Kitty Phelps APRN - CNP, 6 mg at 08/18/24 0849    azithromycin (ZITHROMAX) tablet 250 mg, 250 mg, Oral, Daily, Kitty Phelps APRN - CNP, 250 mg at 08/17/24 1724    insulin glargine (LANTUS) injection vial 32 Units, 32 Units, SubCUTAneous, Nightly, Kitty Phelps APRN - CNP, 32 Units at 08/17/24 2207    insulin glargine (LANTUS) injection vial 80 Units, 80 Units, SubCUTAneous, Daily, Kitty Phelps APRN - CNP, 80 Units at 08/18/24 0832    insulin lispro (HUMALOG,ADMELOG) injection vial 0-16 Units, 0-16 Units, SubCUTAneous, TID WC, Kitty Phelps APRN - CNP, 8 Units at 08/18/24 1206    insulin lispro (HUMALOG,ADMELOG) injection vial 0-4 Units, 0-4 Units, SubCUTAneous, Nightly, Kitty Phelps APRN - CNP, 4 Units at 08/16/24 2148    azelastine (ASTELIN) 0.1 % nasal spray 2 spray, 2 spray, Each Nostril, BID, Ed Pino MD    LAB DATA REVIEWED:    Recent Results (from the past 24 hour(s))   POCT Glucose    Collection Time: 08/17/24  1:40 PM   Result Value Ref Range    POC Glucose 391 (H) 65 - 100 mg/dL    Performed by: Pierre Mane RN    Culture, Respiratory    Collection Time: 08/17/24  2:00 PM    Specimen: Sputum   Result Value Ref Range    Special Requests No Special Requests      Gram Stain Rare Epithelial cells seen      Gram Stain 1+ WBCs seen      Gram Stain 3+ Gram Negative Rods      Gram Stain Occasional Gram Positive Cocci in pairs  and time. Alert. Normal strength. No cranial nerve deficit or sensory deficit. Displays a negative Romberg sign.        ASSESSMENT & PLAN number melatonin    COVID-19  Volume/fluid overload  Diabetes  COPD  Anemia  Hyperkalemia  Acute CHF  Respiratory acidosis with metabolic alkalosis  Acute kidney injury on chronic kidney disease stage III  Sleep apnea  Hypertension  Arthritis  Chronic stasis ulcers bilaterally legs  Hyperkalemia    Vitamin C 1000 mg oral twice daily  Aspirin 81 Mg oral daily  Lipitor 40 Mg oral nightly  Astelin 0.1% nasal spray 2 spray each nostril 2 times daily  Azithromycin 250 mg oral Daily  B complex vitamin capsule oral daily  Symbicort 80-4.5 inhalation 2 puff  Spiriva Respimat 2.5 mcg/ACT inhaler 2 puff daily  Tums 500 mg oral 2 times daily  Zyrtec 10 mg oral nightly  Decadron 6 Mg oral daily  Iron tablet 325 mg oral Daily  Diflucan 200 mg oral Daily  Flonase 50 mm CG/ACT nasal spray 2 spray each nostril daily  Lasix injection 60 mg intervenous 3 times daily  Neurontin 400 mg oral twice daily  Lantus injection 32 units subcutaneous nightly  Lantus injection 80 units subcutaneous daily  Lidocaine 4% external patch every 12 hours daily.  Zyvox 600 mg oral every 12 hours  Mag-ox 400 Mg oral daily  Melatonin 3 mg nightly  Nystatin 500,000 units oral 4 times daily  Protonix 40 Mg oral daily  SENOKOT 8.6 mg oral Daily  Flomax 0.8 Mg oral daily  Zinc sulfate 50 mg oral Daily    Kayexalate 30 g    COVID-19 detected on PCR    EKG demonstrates ventricular paced rhythm    Chest x-ray performed 8/15-cardiomegaly and mild pulmonary edema    Consulted nephrology 8/15    Respiratory culture 8/17-Gram stain demonstrates 1+ WBCs, 3+ gram-negative rods, occasional gram-positive cocci in pairs    ________________________________________________________________________    Signed: Ed Pino MD

## 2024-08-18 NOTE — PROGRESS NOTES
History and Physical    NAME:   Shantanu Casillas   :  1955   MRN:  [id number]     Date/Time: 2024 10:30 AM    Patient PCP: Ed Pino MD  ______________________________________________________________________       Subjective:     CHIEF COMPLAINT:   Shortness of breath      HISTORY OF PRESENT ILLNESS:     General Daily Progress Note  Patient is a 68 y.o. year old male with past medical history of arthritis, coronary artery disease, COPD, diabetes, gout, hypertension, sleep apnea presents to the ED with dyspnea. Patient reports he was diagnosed with COVID 1 week prior, and has been increasingly short of breath since then. Also states he was taken off of his diuretic and has had increasing swelling in the legs and abdomen. He has a productive cough, states he feels like he cannot \"get it out \". No nausea or vomiting. No fevers at home.       HPI-patient alert and in moderate distress.  He endorses shortness of breath, coughing, nausea without vomiting.  He denies chest pain, palpitations, fever, headache, abdominal pain and diarrhea.    Vitals-blood pressure 127/84 pulse 98 temperature 97.5 respirations 18 O2 97%    Labs-potassium 5.4, BUN 67, creatinine 3.38, GFR 19, glucose 248, BNP 3528, ammonia 43, hemoglobin 8.9, hematocrit 28.4, MCV 95, pH 7.32, pCO2 49, D-dimer 2.97    COVID-19 detected on PCR    EKG demonstrates ventricular paced rhythm    Chest x-ray performed 8/15-cardiomegaly and mild pulmonary edema    Consulted nephrology 8/15    Consulted infectious disease 8/15 -  Check CRP, procal, LDH and ferritin  Continue Linezolid for now    Consulted cardiology -may require echocardiogram for further management as patient is clinically stable for me to expose other team member in the hospital to proceed with echocardiogram as it is not deemed necessary based on my clinical assessment. Optimize guideline directed medical management for cardiomyopathy        HPI-patient was lying down

## 2024-08-19 ENCOUNTER — APPOINTMENT (OUTPATIENT)
Facility: HOSPITAL | Age: 69
End: 2024-08-19
Attending: INTERNAL MEDICINE
Payer: MEDICARE

## 2024-08-19 LAB
ALBUMIN SERPL-MCNC: 2.2 G/DL (ref 3.5–5)
ALBUMIN/GLOB SERPL: 0.5 (ref 1.1–2.2)
ALP SERPL-CCNC: 91 U/L (ref 45–117)
ALT SERPL-CCNC: 22 U/L (ref 12–78)
ANION GAP SERPL CALC-SCNC: 10 MMOL/L (ref 5–15)
AST SERPL W P-5'-P-CCNC: 24 U/L (ref 15–37)
BACTERIA SPEC CULT: ABNORMAL
BILIRUB SERPL-MCNC: 0.2 MG/DL (ref 0.2–1)
BNP SERPL-MCNC: 6482 PG/ML
BUN SERPL-MCNC: 91 MG/DL (ref 6–20)
BUN/CREAT SERPL: 28 (ref 12–20)
CA-I BLD-MCNC: 8.6 MG/DL (ref 8.5–10.1)
CHLORIDE SERPL-SCNC: 102 MMOL/L (ref 97–108)
CO2 SERPL-SCNC: 26 MMOL/L (ref 21–32)
CREAT SERPL-MCNC: 3.21 MG/DL (ref 0.7–1.3)
ERYTHROCYTE [DISTWIDTH] IN BLOOD BY AUTOMATED COUNT: 20.1 % (ref 11.5–14.5)
GLOBULIN SER CALC-MCNC: 4.7 G/DL (ref 2–4)
GLUCOSE BLD STRIP.AUTO-MCNC: 203 MG/DL (ref 65–100)
GLUCOSE BLD STRIP.AUTO-MCNC: 259 MG/DL (ref 65–100)
GLUCOSE BLD STRIP.AUTO-MCNC: 284 MG/DL (ref 65–100)
GLUCOSE BLD STRIP.AUTO-MCNC: 285 MG/DL (ref 65–100)
GLUCOSE BLD STRIP.AUTO-MCNC: 336 MG/DL (ref 65–100)
GLUCOSE SERPL-MCNC: 277 MG/DL (ref 65–100)
GRAM STN SPEC: ABNORMAL
HCT VFR BLD AUTO: 28.9 % (ref 36.6–50.3)
HGB BLD-MCNC: 8.9 G/DL (ref 12.1–17)
Lab: ABNORMAL
MCH RBC QN AUTO: 29.5 PG (ref 26–34)
MCHC RBC AUTO-ENTMCNC: 30.8 G/DL (ref 30–36.5)
MCV RBC AUTO: 95.7 FL (ref 80–99)
NRBC # BLD: 0.2 K/UL (ref 0–0.01)
NRBC BLD-RTO: 2 PER 100 WBC
PERFORMED BY:: ABNORMAL
PHOSPHATE SERPL-MCNC: 3.1 MG/DL (ref 2.6–4.7)
PLATELET # BLD AUTO: 221 K/UL (ref 150–400)
PMV BLD AUTO: 11.2 FL (ref 8.9–12.9)
POTASSIUM SERPL-SCNC: 4.7 MMOL/L (ref 3.5–5.1)
PROT SERPL-MCNC: 6.9 G/DL (ref 6.4–8.2)
RBC # BLD AUTO: 3.02 M/UL (ref 4.1–5.7)
SODIUM SERPL-SCNC: 138 MMOL/L (ref 136–145)
WBC # BLD AUTO: 10.2 K/UL (ref 4.1–11.1)

## 2024-08-19 PROCEDURE — 80053 COMPREHEN METABOLIC PANEL: CPT

## 2024-08-19 PROCEDURE — 83880 ASSAY OF NATRIURETIC PEPTIDE: CPT

## 2024-08-19 PROCEDURE — 2580000003 HC RX 258: Performed by: INTERNAL MEDICINE

## 2024-08-19 PROCEDURE — 2580000003 HC RX 258: Performed by: NURSE PRACTITIONER

## 2024-08-19 PROCEDURE — 97535 SELF CARE MNGMENT TRAINING: CPT

## 2024-08-19 PROCEDURE — 85027 COMPLETE CBC AUTOMATED: CPT

## 2024-08-19 PROCEDURE — 6360000004 HC RX CONTRAST MEDICATION

## 2024-08-19 PROCEDURE — 2060000000 HC ICU INTERMEDIATE R&B

## 2024-08-19 PROCEDURE — 6360000002 HC RX W HCPCS: Performed by: INTERNAL MEDICINE

## 2024-08-19 PROCEDURE — 2700000000 HC OXYGEN THERAPY PER DAY

## 2024-08-19 PROCEDURE — 6370000000 HC RX 637 (ALT 250 FOR IP): Performed by: NURSE PRACTITIONER

## 2024-08-19 PROCEDURE — 6360000002 HC RX W HCPCS: Performed by: NURSE PRACTITIONER

## 2024-08-19 PROCEDURE — 6370000000 HC RX 637 (ALT 250 FOR IP): Performed by: FAMILY MEDICINE

## 2024-08-19 PROCEDURE — 6370000000 HC RX 637 (ALT 250 FOR IP): Performed by: INTERNAL MEDICINE

## 2024-08-19 PROCEDURE — 36415 COLL VENOUS BLD VENIPUNCTURE: CPT

## 2024-08-19 PROCEDURE — 97530 THERAPEUTIC ACTIVITIES: CPT

## 2024-08-19 PROCEDURE — 99232 SBSQ HOSP IP/OBS MODERATE 35: CPT | Performed by: INTERNAL MEDICINE

## 2024-08-19 PROCEDURE — 82962 GLUCOSE BLOOD TEST: CPT

## 2024-08-19 PROCEDURE — 94640 AIRWAY INHALATION TREATMENT: CPT

## 2024-08-19 PROCEDURE — 84100 ASSAY OF PHOSPHORUS: CPT

## 2024-08-19 PROCEDURE — 94761 N-INVAS EAR/PLS OXIMETRY MLT: CPT

## 2024-08-19 PROCEDURE — 97166 OT EVAL MOD COMPLEX 45 MIN: CPT

## 2024-08-19 PROCEDURE — C8929 TTE W OR WO FOL WCON,DOPPLER: HCPCS

## 2024-08-19 RX ORDER — CLOTRIMAZOLE 10 MG/1
10 LOZENGE ORAL 3 TIMES DAILY
Status: DISCONTINUED | OUTPATIENT
Start: 2024-08-19 | End: 2024-08-24 | Stop reason: HOSPADM

## 2024-08-19 RX ADMIN — CALCIUM CARBONATE 500 MG: 500 TABLET, CHEWABLE ORAL at 09:26

## 2024-08-19 RX ADMIN — CALCIUM CARBONATE 500 MG: 500 TABLET, CHEWABLE ORAL at 21:03

## 2024-08-19 RX ADMIN — CEFTRIAXONE SODIUM 2000 MG: 2 INJECTION, POWDER, FOR SOLUTION INTRAMUSCULAR; INTRAVENOUS at 17:05

## 2024-08-19 RX ADMIN — OXYCODONE HYDROCHLORIDE 10 MG: 10 TABLET ORAL at 07:13

## 2024-08-19 RX ADMIN — PERFLUTREN 1 ML: 6.52 INJECTION, SUSPENSION INTRAVENOUS at 17:04

## 2024-08-19 RX ADMIN — PANTOPRAZOLE SODIUM 40 MG: 40 TABLET, DELAYED RELEASE ORAL at 07:13

## 2024-08-19 RX ADMIN — OXYCODONE HYDROCHLORIDE AND ACETAMINOPHEN 1000 MG: 500 TABLET ORAL at 09:26

## 2024-08-19 RX ADMIN — ASPIRIN 81 MG CHEWABLE TABLET 81 MG: 81 TABLET CHEWABLE at 09:26

## 2024-08-19 RX ADMIN — HYDRALAZINE HYDROCHLORIDE 10 MG: 10 TABLET ORAL at 13:10

## 2024-08-19 RX ADMIN — FLUTICASONE PROPIONATE 2 SPRAY: 50 SPRAY, METERED NASAL at 09:27

## 2024-08-19 RX ADMIN — Medication 3 MG: at 21:04

## 2024-08-19 RX ADMIN — Medication 1 CAPSULE: at 09:26

## 2024-08-19 RX ADMIN — ISOSORBIDE DINITRATE 20 MG: 5 TABLET ORAL at 17:05

## 2024-08-19 RX ADMIN — GABAPENTIN 400 MG: 400 CAPSULE ORAL at 13:10

## 2024-08-19 RX ADMIN — INSULIN GLARGINE 80 UNITS: 100 INJECTION, SOLUTION SUBCUTANEOUS at 09:26

## 2024-08-19 RX ADMIN — ISOSORBIDE DINITRATE 20 MG: 5 TABLET ORAL at 13:10

## 2024-08-19 RX ADMIN — SODIUM CHLORIDE, PRESERVATIVE FREE 10 ML: 5 INJECTION INTRAVENOUS at 21:05

## 2024-08-19 RX ADMIN — CLOTRIMAZOLE 10 MG: 10 LOZENGE ORAL at 21:03

## 2024-08-19 RX ADMIN — INSULIN LISPRO 8 UNITS: 100 INJECTION, SOLUTION INTRAVENOUS; SUBCUTANEOUS at 09:26

## 2024-08-19 RX ADMIN — Medication 400 UNITS: at 09:43

## 2024-08-19 RX ADMIN — FERROUS SULFATE TAB 325 MG (65 MG ELEMENTAL FE) 325 MG: 325 (65 FE) TAB at 09:25

## 2024-08-19 RX ADMIN — FLUCONAZOLE 200 MG: 100 TABLET ORAL at 09:25

## 2024-08-19 RX ADMIN — HYDRALAZINE HYDROCHLORIDE 10 MG: 10 TABLET ORAL at 21:02

## 2024-08-19 RX ADMIN — ACETAMINOPHEN 650 MG: 325 TABLET ORAL at 04:00

## 2024-08-19 RX ADMIN — CLOTRIMAZOLE 10 MG: 10 LOZENGE ORAL at 14:44

## 2024-08-19 RX ADMIN — WHITE PETROLATUM,ZINC OXIDE: 57; 17 PASTE TOPICAL at 21:05

## 2024-08-19 RX ADMIN — WHITE PETROLATUM,ZINC OXIDE: 57; 17 PASTE TOPICAL at 09:34

## 2024-08-19 RX ADMIN — Medication 1 LOZENGE: at 09:28

## 2024-08-19 RX ADMIN — GABAPENTIN 400 MG: 400 CAPSULE ORAL at 21:03

## 2024-08-19 RX ADMIN — GABAPENTIN 400 MG: 400 CAPSULE ORAL at 09:26

## 2024-08-19 RX ADMIN — LINEZOLID 600 MG: 600 TABLET, FILM COATED ORAL at 09:43

## 2024-08-19 RX ADMIN — OXYCODONE HYDROCHLORIDE 10 MG: 10 TABLET ORAL at 14:44

## 2024-08-19 RX ADMIN — Medication 2 PUFF: at 21:32

## 2024-08-19 RX ADMIN — TAMSULOSIN HYDROCHLORIDE 0.8 MG: 0.4 CAPSULE ORAL at 09:25

## 2024-08-19 RX ADMIN — Medication 1 MG: at 09:43

## 2024-08-19 RX ADMIN — INSULIN LISPRO 8 UNITS: 100 INJECTION, SOLUTION INTRAVENOUS; SUBCUTANEOUS at 17:06

## 2024-08-19 RX ADMIN — SODIUM CHLORIDE, PRESERVATIVE FREE 10 ML: 5 INJECTION INTRAVENOUS at 09:29

## 2024-08-19 RX ADMIN — INSULIN LISPRO 4 UNITS: 100 INJECTION, SOLUTION INTRAVENOUS; SUBCUTANEOUS at 12:06

## 2024-08-19 RX ADMIN — CETIRIZINE HYDROCHLORIDE 10 MG: 10 TABLET, FILM COATED ORAL at 21:04

## 2024-08-19 RX ADMIN — Medication 2 PUFF: at 08:11

## 2024-08-19 RX ADMIN — INSULIN LISPRO 4 UNITS: 100 INJECTION, SOLUTION INTRAVENOUS; SUBCUTANEOUS at 21:02

## 2024-08-19 RX ADMIN — Medication 400 UNITS: at 21:04

## 2024-08-19 RX ADMIN — OXYCODONE HYDROCHLORIDE AND ACETAMINOPHEN 1000 MG: 500 TABLET ORAL at 21:04

## 2024-08-19 RX ADMIN — ATORVASTATIN CALCIUM 40 MG: 40 TABLET, FILM COATED ORAL at 21:04

## 2024-08-19 RX ADMIN — INSULIN GLARGINE 32 UNITS: 100 INJECTION, SOLUTION SUBCUTANEOUS at 21:02

## 2024-08-19 RX ADMIN — DEXAMETHASONE 6 MG: 4 TABLET ORAL at 09:43

## 2024-08-19 RX ADMIN — ZINC SULFATE 220 MG (50 MG) CAPSULE 50 MG: CAPSULE at 09:26

## 2024-08-19 RX ADMIN — AZITHROMYCIN DIHYDRATE 250 MG: 500 TABLET ORAL at 17:05

## 2024-08-19 RX ADMIN — LINEZOLID 600 MG: 600 TABLET, FILM COATED ORAL at 21:04

## 2024-08-19 RX ADMIN — Medication 400 MG: at 09:25

## 2024-08-19 RX ADMIN — ISOSORBIDE DINITRATE 20 MG: 5 TABLET ORAL at 09:26

## 2024-08-19 RX ADMIN — HYDRALAZINE HYDROCHLORIDE 10 MG: 10 TABLET ORAL at 07:13

## 2024-08-19 RX ADMIN — OXYCODONE HYDROCHLORIDE 10 MG: 10 TABLET ORAL at 21:03

## 2024-08-19 ASSESSMENT — PAIN DESCRIPTION - LOCATION
LOCATION: GENERALIZED
LOCATION: HEAD
LOCATION: KNEE;BACK;NECK
LOCATION: GENERALIZED

## 2024-08-19 ASSESSMENT — PAIN DESCRIPTION - DESCRIPTORS
DESCRIPTORS: ACHING
DESCRIPTORS: ACHING;DISCOMFORT;CRAMPING

## 2024-08-19 ASSESSMENT — PAIN SCALES - GENERAL
PAINLEVEL_OUTOF10: 10
PAINLEVEL_OUTOF10: 2
PAINLEVEL_OUTOF10: 0
PAINLEVEL_OUTOF10: 8
PAINLEVEL_OUTOF10: 8
PAINLEVEL_OUTOF10: 3

## 2024-08-19 ASSESSMENT — PAIN DESCRIPTION - ORIENTATION
ORIENTATION: RIGHT;LEFT;LOWER
ORIENTATION: MID

## 2024-08-19 NOTE — CARE COORDINATION
DCP pending family and patient decision.    Patient admitted to hospital from [facility], referral placed to [facility] and [facility] , accepted at both.    CM continues to follow and monitor for needs.

## 2024-08-19 NOTE — PROGRESS NOTES
Progress Note      8/19/2024 8:16 AM  NAME: Shantanu Casillas   MRN:  [id number]   Admit Diagnosis: DEMARCO (acute kidney injury) (HCC) [N17.9]  Fluid overload [E87.70]  Volume overload [E87.70]  Hypervolemia, unspecified hypervolemia type [E87.70]  COVID-19 [U07.1]      Problem List:   -Admitted to the hospital with shortness of breath  -COVID-19 infection.  -COPD  -History of coronary artery disease details unavailable at this time  -History of congestive heart failure with last known ejection fraction of 40 to 45% as of 2023  -Sleep apnea  -Hypertension  -Diabetes mellitus  -Arthritis         Assessment/Plan:   Note dictated August 19, 2024  -Patient lying comfortably in the bed.  No clinical interval changes from yesterday and patient is otherwise clinically and hemodynamically stable and receiving symptomatic treatment for COVID-19 infection.  -Once COVID infection test is negative we will proceed with echocardiogram.  -Continue to optimize guideline directed medical management for cardiomyopathy and increase doses as tolerated per hemodynamics.  ===============================================================    -68-year-old white male with history of cardiomyopathy the details of which is not known to me at this time admitted to the hospital with shortness of breath and found to have COVID-19 infection.  -Patient has history of COPD hypertension diabetes mellitus arthritis and sleep apnea with no recent cardiac workup being done per my communication directly with him.  -Cardiac enzyme has been unremarkable and EKG shows no acute changes.  -Telemetry reveals sinus rhythm.  -Will check serial cardiac enzymes continue to monitor him on telemetry and once cleared from ID may require echocardiogram for further management as patient is clinically stable for me to expose other team member in the hospital to proceed with echocardiogram as it is not deemed necessary based on my clinical assessment  -Will follow  sulfate 2000 mg in 50 mL IVPB premix  2,000 mg IntraVENous PRN    ondansetron (ZOFRAN-ODT) disintegrating tablet 4 mg  4 mg Oral Q8H PRN    Or    ondansetron (ZOFRAN) injection 4 mg  4 mg IntraVENous Q6H PRN    polyethylene glycol (GLYCOLAX) packet 17 g  17 g Oral Daily PRN    acetaminophen (TYLENOL) tablet 650 mg  650 mg Oral Q6H PRN    Or    acetaminophen (TYLENOL) suppository 650 mg  650 mg Rectal Q6H PRN    linezolid (ZYVOX) tablet 600 mg  600 mg Oral 2 times per day    albuterol sulfate HFA (PROVENTIL;VENTOLIN;PROAIR) 108 (90 Base) MCG/ACT inhaler 1 puff  1 puff Inhalation Q4H PRN    ascorbic acid (VITAMIN C) tablet 1,000 mg  1,000 mg Oral BID    aspirin chewable tablet 81 mg  81 mg Oral Daily    atorvastatin (LIPITOR) tablet 40 mg  40 mg Oral Nightly    b complex vitamins capsule 1 capsule  1 capsule Oral Daily    Benzocaine-Menthol (CEPACOL) 1 lozenge  1 lozenge Oral Q2H PRN    calcium carbonate (TUMS) chewable tablet 500 mg  1 tablet Oral BID    cetirizine (ZYRTEC) tablet 10 mg  10 mg Oral Nightly    clonazePAM (KLONOPIN) tablet 0.25 mg  0.25 mg Oral BID PRN    ferrous sulfate (IRON 325) tablet 325 mg  325 mg Oral Daily with breakfast    fluconazole (DIFLUCAN) tablet 200 mg  200 mg Oral Daily    fluticasone (FLONASE) 50 MCG/ACT nasal spray 2 spray  2 spray Each Nostril Daily    budesonide-formoterol (SYMBICORT) 80-4.5 MCG/ACT inhaler 2 puff  2 puff Inhalation BID RT    And    tiotropium (SPIRIVA RESPIMAT) 2.5 MCG/ACT inhaler 2 puff  2 puff Inhalation Daily RT    guaiFENesin (ROBITUSSIN) 100 MG/5ML liquid 200 mg  200 mg Oral 4x Daily PRN    hydrALAZINE (APRESOLINE) tablet 25 mg  25 mg Oral Q6H PRN    hydrOXYzine HCl (ATARAX) tablet 10 mg  10 mg Oral Q6H PRN    ipratropium 0.5 mg-albuterol 2.5 mg (DUONEB) nebulizer solution 1 Dose  1 Dose Inhalation Q4H PRN    lidocaine 4 % external patch 1 patch  1 patch TransDERmal Daily    magnesium oxide (MAG-OX) tablet 400 mg  400 mg Oral Daily    melatonin tablet 3 mg  3

## 2024-08-19 NOTE — PLAN OF CARE
OCCUPATIONAL THERAPY EVALUATION  Patient: Shantanu Casillas (68 y.o. male)  Date: 8/19/2024  Primary Diagnosis: DEMARCO (acute kidney injury) (HCC) [N17.9]  Fluid overload [E87.70]  Volume overload [E87.70]  Hypervolemia, unspecified hypervolemia type [E87.70]  COVID-19 [U07.1]       Precautions: Fall Risk, Isolation (Airborne, droplet precautions)                Recommendations for nursing mobility: Frequent repositioning to prevent skin breakdown, Use of BSC for toileting , and Assist x2    In place during session:Nasal Cannula 3L, External Catheter, EKG/telemetry , and Pulse ox  ASSESSMENT  Mr. Casillas is a 68 y.o. male presenting to [facility] with fluid and volume overload was admitted 8/15/24 and currently being treated for COVID-19 and DEMARCO. Pt received semi-supine in bed upon arrival, A & O x 4, and agreeable to OT evaluation.     Based on current observations, pt presents with decreased  functional mobility, independence in ADLs, strength, activity tolerance, endurance, fine-motor control (see below for objective details and assist levels).     Overall, he tolerated today's OT/PT session given increased time and assistance of 1-2 for safety. Currently, Mr. Casillas is motivated to engage in therapy sessions to improve his level of function. He currently requires assistance as follows:  Feeding - Min A; UE Dressing - Mod A; LE Dressing - Total; Toileting - Total; Bed Mobility - Min A; and Transfers - Mod-Max A x 1-2. Pt will benefit from continued skilled OT services to address current impairments and improve IND and safety with self cares and functional transfers/mobility. Potential barriers for safe discharge: none. Current OT d/c recommendation Inpatient Rehabilitation Facility once medically appropriate.     EOB Standing End/Resting End of session   SPO2 (%) 100 98 79-86    Heart Rate (BPM) 77 109 116 78     GOALS:  Problem: Occupational Therapy - Adult  Goal: By Discharge: Performs self-care activities at  3 []  4   3.  Toileting, which includes using toilet, bedpan or urinal? [x] 1 []  2 []  3 []  4   4.  Putting on and taking off regular upper body clothing? []  1 [x]  2 []  3 []  4   5.  Taking care of personal grooming such as brushing teeth? []  1 []  2 [x]  3 []  4   6.  Eating meals? []  1 []  2 [x]  3 []  4   © , Trustees of [facility], under license to [employer]. All rights reserved     Score:      Interpretation of Tool:  Represents clinically-significant functional categories (i.e. Activities of daily living).  Percentage of Impairment CH    0%   CI    1-19% CJ    20-39% CK    40-59% CL    60-79% CM    80-99% CN     100%   [facility]  Score 6-24 24 23 20-22 15-19 10-14 7-9 6     Occupational Therapy Evaluation Charge Determination   History Examination Decision-Making   MEDIUM Complexity : Expanded review of history including physical, cognitive and psychocial history  MEDIUM Complexity: 3-5 Performance deficits relating to physical, cognitive, or psychosocial skills that result in activity limitations and/or participation restrictions MEDIUM Complexity: Patient may present with comorbidities that affect occupational performance. Minimal to moderate modifications of tasks or assist (eg. physical or verbal) with assist is necessary to enable pt to complete eval      Based on the above components, the patient evaluation is determined to be of the following complexity level: Medium    Pain Ratin/10   Pain Intervention(s):   N/A    Activity Tolerance:   Fair , requires rest breaks, and desaturates with activity and requires oxygen    After treatment patient left in no apparent distress:    Patient left in no apparent distress in bed, Call bell within reach, and Side rails x3, bed locked and in lowest position    COMMUNICATION/EDUCATION:   The patient’s plan of care was discussed with: Physical therapist    Patient Education  Education Given To: Patient  Education Provided: Role of  Therapy;Transfer Training;Equipment;Plan of Care;Energy Conservation;Home Exercise Program;Mobility Training;ADL Adaptive Strategies  Education Method: Demonstration;Verbal  Barriers to Learning: None  Education Outcome: Verbalized understanding;Demonstrated understanding    The supervising occupational therapist and treating occupational therapist assistant have met to review this patient’s progress and plan of care.    This patient’s plan of care is appropriate for delegation to [facility].     Thank you for this referral,  Joey Benton OT  Minutes: 38

## 2024-08-19 NOTE — PLAN OF CARE
PHYSICAL THERAPY TREATMENT     Patient: Shantanu Casillas (68 y.o. male)  Date: 8/19/2024  Diagnosis: DEMARCO (acute kidney injury) (HCC) [N17.9]  Fluid overload [E87.70]  Volume overload [E87.70]  Hypervolemia, unspecified hypervolemia type [E87.70]  COVID-19 [U07.1] Volume overload      Precautions:                        Recommendations for nursing mobility: Out of bed to chair for meals, Encourage HEP in prep for ADLs/mobility; see handout for details, AD and gt belt for bed to chair , Amb to bathroom with AD and gait belt, and Assist x1    In place during session: Nasal Cannula 3L, External Catheter, EKG/telemetry , and Pulse ox  Chart, physical therapy assessment, plan of care and goals were reviewed.  ASSESSMENT  Patient continues with skilled PT services and is slowly progressing towards goals. Pt seated EOB with OT upon PT arrival, agreeable to session. Pt A&O x 4. (See below for objective details and assist levels).     Overall pt tolerated session fair today with no c/o pain throughout session. Pt completed all transfers with CGA-Gonzalo for occasional walker stabilization. Pt continues to demo fwd flexed posture in standing with B forearm support on RW. Pt amb 10' with FWW and CGA; demos wide OH, decreased step clearance, downward gaze and rounded shoulder posture, requires VC for improved posture. SpO2 desat to 86% with mobility, MD entered room and spoke with pt bedside x2-3min, spO2 continued to desat to 79%. Pt instructed to complete PLB and spO2 recovered to >95% <10sec with rest break. Pt educated on the importance of pacing, PLB for energy conservation, pt verbalized understanding. Pt returned to supine with Gonzalo for LE support.  Will continue to benefit from skilled PT services, and will continue to progress as tolerated. Potential barriers for safe discharge: pt is a high fall risk, pt is not safe to be alone, and concern for pt safely navigating or managing the home environment. Current PT  DC recommendation Inpatient Rehabilitation Facility  once medically appropriate.     Start of Session standing Post-mobility End of Session   SPO2 (%) 100 98 79-86    Heart Rate (BPM) 77 seated  116 78     GOALS:    Problem: Physical Therapy - Adult  Goal: By Discharge: Performs mobility at highest level of function for planned discharge setting.  See evaluation for individualized goals.  Description: FUNCTIONAL STATUS PRIOR TO ADMISSION: Patient was modified independent using a rollator for functional mobility.    HOME SUPPORT PRIOR TO ADMISSION: The patient lived with wife and daughter but did not require assistance for mobility.    Physical Therapy Goals  Initiated 8/18/2024  Pt stated goal: \"Get my strength back\"  Pt will be I with LE HEP in 7 days.  Pt will perform bed mobility with [place] in 7 days.  Pt will perform transfers with Modified [place] in 7 days.   Pt will amb 50 feet with LRAD safely with Supervision in 7 days.  Pt will demonstrate improvement in static standing balance from Contact Guard Assist to [place] in 7 days.     8/19/2024 1604 by Kennedi Hargrove, PT  Outcome: Progressing  8/19/2024 1604 by Kennedi Hargrove, PT  Outcome: Progressing          PLAN :  Patient continues to benefit from skilled intervention to address functional impairments. Continue treatment per established plan of care to address goals.    Recommendation for discharge: (in order for the patient to meet his/her long term goals)  Inpatient Rehabilitation Facility     IF patient discharges home will need the following DME:rolling walker     SUBJECTIVE:   Patient stated “Can you guys come see me every day?.” \"I was walking 190 feet at [facility]\"    OBJECTIVE DATA SUMMARY:   Critical Behavior:  Orientation  Overall Orientation Status: Within Normal Limits  Orientation Level: Oriented X4       Functional Mobility Training:  Bed Mobility:  Bed Mobility Training  Bed Mobility Training:  No  Transfers:  Transfer Training  Transfer Training: Yes  Sit to Stand: Contact-guard assistance;Minimum assistance;Adaptive equipment  Stand to Sit: Contact-guard assistance;Adaptive equipment  Balance:  Balance  Sitting: Intact  Standing: Impaired  Standing - Static: Fair;Constant support (forearm support on RW)  Standing - Dynamic: Fair;Constant support (as above)  Wheelchair Mobility:     Ambulation/Gait Training:                                Gait  Gait Training: Yes  Overall Level of Assistance: Contact-guard assistance  Distance (ft): 10 Feet  Assistive Device: Walker, rolling  Interventions: Visual cues;Verbal cues;Manual cues;Tactile cues;Weight shifting training/pressure relief  Base of Support: Widened  Speed/[place]: Slow;Shuffled  Gait Abnormalities: Decreased step clearance;Shuffling gait (rounded shoulders, downward gaze)                      Pain Ratin/10 none reported  Pain Intervention(s):       Activity Tolerance:   Fair , requires frequent rest breaks, and desaturates with activity and requires oxygen    After treatment patient left in no apparent distress:   Bed locked and returned to lowest position, Patient left in no apparent distress in bed, Call bell within reach, and Side rails x3, and nsg updated     COMMUNICATION/COLLABORATION:   The patient’s plan of care was discussed with: Occupational therapist and Registered nurse    Patient Education  Education Given To: Patient  Education Provided: Energy Conservation;Plan of Care;ADL Adaptive Strategies  Education Provided Comments: PLB, increased breaks, pacing  Education Method: Verbal  Barriers to Learning: None  Education Outcome: Verbalized understanding    PT/OT sessions occurred together for increased safety of pt and clinician.      Kennedi Hargrove PT  Minutes: 32

## 2024-08-19 NOTE — PLAN OF CARE
Problem: Discharge Planning  Goal: Discharge to home or other facility with appropriate resources  8/18/2024 0742 by Alhaji Jay RN  Outcome: Progressing     Problem: Skin/Tissue Integrity  Goal: Absence of new skin breakdown  Description: 1.  Monitor for areas of redness and/or skin breakdown  2.  Assess vascular access sites hourly  3.  Every 4-6 hours minimum:  Change oxygen saturation probe site  4.  Every 4-6 hours:  If on nasal continuous positive airway pressure, respiratory therapy assess nares and determine need for appliance change or resting period.  8/18/2024 2121 by Ирина De Anda RN  Outcome: Progressing  8/18/2024 0742 by Alhaji Jay RN  Outcome: Progressing     Problem: ABCDS Injury Assessment  Goal: Absence of physical injury  8/18/2024 2121 by Ирина De Anda RN  Outcome: Progressing  8/18/2024 0742 by Alhaji Jay RN  Outcome: Progressing     Problem: Safety - Adult  Goal: Free from fall injury  8/18/2024 2121 by Ирина De Anda RN  Outcome: Progressing  8/18/2024 0742 by Alhaji Jay RN  Outcome: Progressing     Problem: Respiratory - Adult  Goal: Achieves optimal ventilation and oxygenation  8/18/2024 0742 by Alhaji Jay RN  Outcome: Progressing     Problem: Cardiovascular - Adult  Goal: Maintains optimal cardiac output and hemodynamic stability  8/18/2024 0742 by Alhaji Jay RN  Outcome: Progressing  Goal: Absence of cardiac dysrhythmias or at baseline  8/18/2024 0742 by Alhaji Jay RN  Outcome: Progressing     Problem: Skin/Tissue Integrity - Adult  Goal: Incisions, wounds, or drain sites healing without S/S of infection  8/18/2024 0742 by Alhaji Jay RN  Outcome: Progressing     Problem: Infection - Adult  Goal: Absence of infection at discharge  8/18/2024 0742 by Alhaji Jay RN  Outcome: Progressing  Goal: Absence of infection during hospitalization  8/18/2024 0742 by Alhaji Jay RN  Outcome: Progressing  Goal: Absence of fever/infection during anticipated  neutropenic period  8/18/2024 0742 by Alhaji Jay RN  Outcome: Progressing     Problem: Metabolic/Fluid and Electrolytes - Adult  Goal: Electrolytes maintained within normal limits  8/18/2024 0742 by Alhaji Jay RN  Outcome: Progressing  Goal: Hemodynamic stability and optimal renal function maintained  8/18/2024 0742 by Alhaji Jay RN  Outcome: Progressing     Problem: Pain  Goal: Verbalizes/displays adequate comfort level or baseline comfort level  8/18/2024 0742 by Alhaji Jay RN  Outcome: Progressing     Problem: Chronic Conditions and Co-morbidities  Goal: Patient's chronic conditions and co-morbidity symptoms are monitored and maintained or improved  8/18/2024 0742 by Alhaji Jay RN  Outcome: Progressing     Problem: Physical Therapy - Adult  Goal: By Discharge: Performs mobility at highest level of function for planned discharge setting.  See evaluation for individualized goals.  Description: FUNCTIONAL STATUS PRIOR TO ADMISSION: Patient was modified independent using a rollator for functional mobility.    HOME SUPPORT PRIOR TO ADMISSION: The patient lived with wife and daughter but did not require assistance for mobility.    Physical Therapy Goals  Initiated 8/18/2024  Pt stated goal: \"Get my strength back\"  Pt will be I with LE HEP in 7 days.  Pt will perform bed mobility with [place] in 7 days.  Pt will perform transfers with Modified [place] in 7 days.   Pt will amb 50 feet with LRAD safely with Supervision in 7 days.  Pt will demonstrate improvement in static standing balance from Contact Guard Assist to [place] in 7 days.     8/18/2024 1043 by Kennedi Scott PT  Outcome: Progressing

## 2024-08-19 NOTE — PROGRESS NOTES
Infectious Disease Progress Note           Subjective:   Pt seen and examined at bedside. Stable, denies new complaints, no acute events since last seen.  Reports feeling much better, pain is well-controlled. Enterobacter cloacae complex and K.Pneumoniae isolated from sputum Cx (). On Ceftriaxone, linezolid and Azithromycin   Objective:   Physical Exam:     /74   Pulse 77   Temp 97.9 °F (36.6 °C) (Oral)   Resp 20   Ht 1.803 m (5' 11\")   Wt (!) 165 kg (363 lb 12.1 oz)   SpO2 100%   BMI 50.73 kg/m²  O2 Flow Rate (L/min): 3 L/min O2 Device: Nasal cannula    Temp (24hrs), Av.8 °F (36.6 °C), Min:97.7 °F (36.5 °C), Max:97.9 °F (36.6 °C)    701 - 1900  In: 610 [P.O.:610]  Out: 600 [Urine:600]   1901 -  0700  In: 1780 [P.O.:1780]  Out: 4200 [Urine:4200]    General: NAD, AAO x 4  HEENT: RBEEKAH, Moist mucosa   Lungs: CTA b/l, decreased at the bases, no wheeze/rhonchi   Heart: S1S2+, RRR, no murmur  Abdo: Soft, NT, ND, +BS   : no chronic juares cath   Exts: decreased b/l leg swelling, compression dressing in place   Skin: b/l leg dressings in place, not examined     Data Review:       Recent Days:    Recent Labs     24  1549 24  0032   WBC 12.1* 10.2   HGB 9.7* 8.9*   HCT 30.8* 28.9*    221     Recent Labs     24  1549 24  0648 24  0032   BUN 86* 96* 91*   CREATININE 3.60* 3.39* 3.21*       Lab Results   Component Value Date/Time    CRP 22.10 2024 10:59 AM          Microbiology     Results       Procedure Component Value Units Date/Time    Culture, Respiratory [[id number]]  (Abnormal)  (Susceptibility) Collected: 24 1400    Order Status: Completed Specimen: Sputum Updated: 24 1342     Special Requests No Special Requests        Gram Stain       Rare Epithelial cells seen            1+ WBCs seen         3+ Gram Negative Rods               Occasional Gram Positive Cocci in pairs           Culture       Heavy      4. CHF w decompensation, Baseline EF of 40-45% in 06/2023      Repeat Echo ordered, will follow     5. Acute on chronic renal failure, Cr likely at baseline           Sergei Kolb MD    8/19/2024

## 2024-08-19 NOTE — PROGRESS NOTES
Nephrology follow up          Patient: Shantanu Casillas MRN: [id number]  SSN: [id number]    YOB: 1955  Age: 68 y.o.  Sex: male      Subjective:   The pt is seen in the room.  Blood pressures are good  Afebrile  On nasal cannula 3 L  Resolving lower extremity swelling  resolving DEMARCO  Potassium 5.2->4.7.  Past Medical History:   Diagnosis Date    Arthritis     CAD (coronary artery disease)     Chronic obstructive pulmonary disease (HCC)     Diabetes (HCC)     Gout     Hypertension     Ill-defined condition     Sleep apnea     cpap     Past Surgical History:   Procedure Laterality Date    FOOT DEBRIDEMENT Right 12/15/2023    INCISION AND DRAINAGE RIGHT FIRST METATARSAL PHALANGEAL JOINT performed by Polo Oviedo DPM at [facility] MAIN OR    FOOT SURGERY      right heel - foriegn object    HX VEIN ABLATION ADHESIVE      ORTHOPEDIC SURGERY      back surgery    PACEMAKER      aicd -      Family History   Problem Relation Age of Onset    Diabetes Mother     Heart Disease Father     Hypertension Father     Diabetes Sister     Diabetes Brother     Hypertension Mother     Heart Disease Mother     Kidney Disease Mother      Social History     Tobacco Use    Smoking status: Never    Smokeless tobacco: Never   Substance Use Topics    Alcohol use: Not Currently      Current Facility-Administered Medications   Medication Dose Route Frequency Provider Last Rate Last Admin    clotrimazole (MYCELEX) matthias 10 mg  10 mg Oral TID Ed Pino MD   10 mg at 08/19/24 1444    cefTRIAXone (ROCEPHIN) 2,000 mg in sterile water 20 mL IV syringe  2,000 mg IntraVENous Q24H Sergei Kolb MD   2,000 mg at 08/19/24 1705    perflutren lipid microspheres syringe  10 mL IntraVENous ONCE PRN Ed Pino MD        hydrALAZINE (APRESOLINE) tablet 10 mg  10 mg Oral 3 times per day Ethan Barker MD   10 mg at 08/19/24 1310    isosorbide dinitrate (ISORDIL) tablet 20 mg  20 mg Oral TID Ethan Barker MD   20 mg at

## 2024-08-19 NOTE — PROGRESS NOTES
History and Physical    NAME:   Shantanu Casillas   :  1955   MRN:  [id number]     Date/Time: 2024 8:47 AM    Patient PCP: Ed Pino MD  ______________________________________________________________________       Subjective:     CHIEF COMPLAINT:   Shortness of breath      HISTORY OF PRESENT ILLNESS:     General Daily Progress Note  Patient is a 68 y.o. year old male with past medical history of arthritis, coronary artery disease, COPD, diabetes, gout, hypertension, sleep apnea presents to the ED with dyspnea. Patient reports he was diagnosed with COVID 1 week prior, and has been increasingly short of breath since then. Also states he was taken off of his diuretic and has had increasing swelling in the legs and abdomen. He has a productive cough, states he feels like he cannot \"get it out \". No nausea or vomiting. No fevers at home.       HPI-patient alert and in moderate distress.  He endorses shortness of breath, coughing, nausea without vomiting.  He denies chest pain, palpitations, fever, headache, abdominal pain and diarrhea.    Vitals-blood pressure 127/84 pulse 98 temperature 97.5 respirations 18 O2 97%    Labs-potassium 5.4, BUN 67, creatinine 3.38, GFR 19, glucose 248, BNP 3528, ammonia 43, hemoglobin 8.9, hematocrit 28.4, MCV 95, pH 7.32, pCO2 49, D-dimer 2.97    COVID-19 detected on PCR    EKG demonstrates ventricular paced rhythm    Chest x-ray performed 8/15-cardiomegaly and mild pulmonary edema    Consulted nephrology 8/15    Consulted infectious disease 8/15 -  Check CRP, procal, LDH and ferritin  Continue Linezolid for now    Consulted cardiology -may require echocardiogram for further management as patient is clinically stable for me to expose other team member in the hospital to proceed with echocardiogram as it is not deemed necessary based on my clinical assessment. Optimize guideline directed medical management for cardiomyopathy        HPI-patient was lying down  and in no apparent distress.  He endorses nausea and shortness of breath.  Still has cough but improving.  Also mention pain in his legs.He denies chest pain, palpitations, fever, headache, abdominal pain and diarrhea.  On 3 L nasal cannula    Vitals-/91 pulse 83 temperature 97.7 respirations 18 O2 99%    Labs-BUN 82, creatinine 3.44, GFR 18, glucose 308  Urine analysis demonstrates moderate traces of leukocyte esterase    Patient asking for scheduled pain medication    8/18  HPI-patient sitting upright in no apparent distress.  He claims his symptoms are overall improving.  He is on 3 L nasal cannula    Vitals-/73 pulse 76 temperature 97.7 respirations 18 O2 99%    Labs-potassium 5.3, BUN 96, creatinine 3.39, GFR 19, glucose 234    Respiratory culture 8/17-Gram stain demonstrates 1+ WBCs, 3+ gram-negative rods, occasional gram-positive cocci in pairs    8/19  HPI-patient was sitting upright in no apparent distress.  He endorses dry mouth.  He denies any other new symptoms.  He is on 3 L nasal cannula    Vitals-/81 pulse 71 temperature 97.9 respirations 20 O2 100%    Labs-BUN 91, creatinine 3.21, GFR 20, glucose 259, BNP 6482, hemoglobin 8.9, hematocrit 28.9    EKG demonstrates ventricular paced rhythm    Consulted cardiology 8/19-once COVID infection test is negative we will proceed with echocardiogram  Past Medical History:   Diagnosis Date    Arthritis     CAD (coronary artery disease)     Chronic obstructive pulmonary disease (HCC)     Diabetes (HCC)     Gout     Hypertension     Ill-defined condition     Sleep apnea     cpap        Past Surgical History:   Procedure Laterality Date    FOOT DEBRIDEMENT Right 12/15/2023    INCISION AND DRAINAGE RIGHT FIRST METATARSAL PHALANGEAL JOINT performed by Polo Oviedo DPM at [facility] MAIN OR    FOOT SURGERY      right heel - foriegn object    HX VEIN ABLATION ADHESIVE      ORTHOPEDIC SURGERY      back surgery    PACEMAKER      aicd -       Social History  REVIEWED:    Recent Results (from the past 24 hour(s))   POCT Glucose    Collection Time: 08/18/24 11:04 AM   Result Value Ref Range    POC Glucose 290 (H) 65 - 100 mg/dL    Performed by: Dustin Hart    POCT Glucose    Collection Time: 08/18/24  3:25 PM   Result Value Ref Range    POC Glucose 386 (H) 65 - 100 mg/dL    Performed by: Dustin Hart    POCT Glucose    Collection Time: 08/18/24  4:59 PM   Result Value Ref Range    POC Glucose 378 (H) 65 - 100 mg/dL    Performed by: Pierre Mane RN    POCT Glucose    Collection Time: 08/18/24  5:58 PM   Result Value Ref Range    POC Glucose 326 (H) 65 - 100 mg/dL    Performed by: Pierre Mane RN    POCT Glucose    Collection Time: 08/18/24  8:31 PM   Result Value Ref Range    POC Glucose 283 (H) 65 - 100 mg/dL    Performed by: RADHA ORELLANA    POCT Glucose    Collection Time: 08/18/24 11:29 PM   Result Value Ref Range    POC Glucose 313 (H) 65 - 100 mg/dL    Performed by: RADHA ORELLANA    CBC    Collection Time: 08/19/24 12:32 AM   Result Value Ref Range    WBC 10.2 4.1 - 11.1 K/uL    RBC 3.02 (L) 4.10 - 5.70 M/uL    Hemoglobin 8.9 (L) 12.1 - 17.0 g/dL    Hematocrit 28.9 (L) 36.6 - 50.3 %    MCV 95.7 80.0 - 99.0 FL    MCH 29.5 26.0 - 34.0 PG    MCHC 30.8 30.0 - 36.5 g/dL    RDW 20.1 (H) 11.5 - 14.5 %    Platelets 221 150 - 400 K/uL    MPV 11.2 8.9 - 12.9 FL    Nucleated RBCs 2.0 (H) 0.0  WBC    nRBC 0.20 (H) 0.00 - 0.01 K/uL   Comprehensive Metabolic Panel    Collection Time: 08/19/24 12:32 AM   Result Value Ref Range    Sodium 138 136 - 145 mmol/L    Potassium 4.7 3.5 - 5.1 mmol/L    Chloride 102 97 - 108 mmol/L    CO2 26 21 - 32 mmol/L    Anion Gap 10 5 - 15 mmol/L    Glucose 277 (H) 65 - 100 mg/dL    BUN 91 (H) 6 - 20 mg/dL    Creatinine 3.21 (H) 0.70 - 1.30 mg/dL    BUN/Creatinine Ratio 28 (H) 12 - 20      Est, Glom Filt Rate 20 (L) >60 ml/min/1.73m2    Calcium 8.6 8.5 - 10.1 mg/dL    Total Bilirubin 0.2 0.2 - 1.0 mg/dL    AST 24 15 - 37 U/L    ALT 22 12 - 78 U/L        ASSESSMENT & PLAN number melatonin    COVID-19  Volume/fluid overload  Diabetes  COPD  Anemia  Hyperkalemia  Acute CHF  Respiratory acidosis with metabolic alkalosis  Acute kidney injury on chronic kidney disease stage III  Sleep apnea  Hypertension  Arthritis  Chronic stasis ulcers bilaterally legs  Hyperkalemia    Vitamin C 1000 mg oral twice daily  Aspirin 81 Mg oral daily  Lipitor 40 Mg oral nightly  Astelin 0.1% nasal spray 2 spray each nostril 2 times daily  Azithromycin 250 mg oral Daily  B complex vitamin capsule oral daily  Symbicort 80-4.5 inhalation 2 puff  Spiriva Respimat 2.5 mcg/ACT inhaler 2 puff daily  Tums 500 mg oral 2 times daily  Zyrtec 10 mg oral nightly  Decadron 6 Mg oral daily  Iron tablet 325 mg oral Daily  Diflucan 200 mg oral Daily  Flonase 50 mm CG/ACT nasal spray 2 spray each nostril daily  Lasix injection 60 mg intervenous 3 times daily  Neurontin 400 mg oral twice daily  Lantus injection 32 units subcutaneous nightly  Lantus injection 80 units subcutaneous daily  Lidocaine 4% external patch every 12 hours daily.  Zyvox 600 mg oral every 12 hours  Mag-ox 400 Mg oral daily  Melatonin 3 mg nightly  Nystatin 500,000 units oral 4 times daily  Protonix 40 Mg oral daily  SENOKOT 8.6 mg oral Daily  Flomax 0.8 Mg oral daily  Zinc sulfate 50 mg oral Daily    Kayexalate 30 g    COVID-19 detected on PCR    EKG demonstrates ventricular paced rhythm    Chest x-ray performed 8/15-cardiomegaly and mild pulmonary edema    Consulted nephrology 8/15    Respiratory culture 8/17-Gram stain demonstrates 1+ WBCs, 3+ gram-negative rods, occasional gram-positive cocci in pairs    Consulted cardiology 8/19-once COVID infection test is negative we will proceed with echocardiogram  ________________________________________________________________________    Signed: Dragan Levi

## 2024-08-19 NOTE — PLAN OF CARE
Problem: Discharge Planning  Goal: Discharge to home or other facility with appropriate resources  Outcome: Progressing     Problem: Skin/Tissue Integrity  Goal: Absence of new skin breakdown  Description: 1.  Monitor for areas of redness and/or skin breakdown  2.  Assess vascular access sites hourly  3.  Every 4-6 hours minimum:  Change oxygen saturation probe site  4.  Every 4-6 hours:  If on nasal continuous positive airway pressure, respiratory therapy assess nares and determine need for appliance change or resting period.  8/19/2024 0812 by Tiffani Judge RN  Outcome: Progressing  8/18/2024 2121 by Ирина De Anda RN  Outcome: Progressing     Problem: ABCDS Injury Assessment  Goal: Absence of physical injury  8/19/2024 0812 by Tiffani Judge RN  Outcome: Progressing  8/18/2024 2121 by Ирина De Anda RN  Outcome: Progressing     Problem: Safety - Adult  Goal: Free from fall injury  8/19/2024 0812 by Tiffani Judge RN  Outcome: Progressing  8/18/2024 2121 by Ирина De Anda RN  Outcome: Progressing     Problem: Respiratory - Adult  Goal: Achieves optimal ventilation and oxygenation  Outcome: Progressing     Problem: Cardiovascular - Adult  Goal: Maintains optimal cardiac output and hemodynamic stability  Outcome: Progressing  Flowsheets (Taken 8/19/2024 0806)  Maintains optimal cardiac output and hemodynamic stability: Monitor blood pressure and heart rate  Goal: Absence of cardiac dysrhythmias or at baseline  Outcome: Progressing  Flowsheets (Taken 8/19/2024 0806)  Absence of cardiac dysrhythmias or at baseline: Monitor cardiac rate and rhythm     Problem: Skin/Tissue Integrity - Adult  Goal: Incisions, wounds, or drain sites healing without S/S of infection  Outcome: Progressing     Problem: Infection - Adult  Goal: Absence of infection at discharge  Outcome: Progressing  Flowsheets (Taken 8/19/2024 0806)  Absence of infection at discharge: Assess and monitor for signs and symptoms of

## 2024-08-19 NOTE — PROGRESS NOTES
Tobacco Use    Smoking status: Never    Smokeless tobacco: Never   Substance Use Topics    Alcohol use: Not Currently        Family History   Problem Relation Age of Onset    Diabetes Mother     Heart Disease Father     Hypertension Father     Diabetes Sister     Diabetes Brother     Hypertension Mother     Heart Disease Mother     Kidney Disease Mother        Allergies   Allergen Reactions    Amitriptyline Angioedema    Naproxen      Other reaction(s): Unknown (comments)  Pt not sure of reaction    Sulfa Antibiotics Nausea And Vomiting        Prior to Admission medications    Medication Sig Start Date End Date Taking? Authorizing Provider   azelastine (ASTELIN) 0.1 % nasal spray 2 sprays by Nasal route 2 times daily Use in each nostril as directed   Yes Doretha Mccarthy MD   benzocaine-menthol (CEPACOL SORE THROAT) 15-3.6 MG lozenge Take 1 lozenge by mouth every 2 hours as needed for Sore Throat   Yes Doretha Mccarthy MD   calcium carbonate (TUMS) 500 MG chewable tablet Take 1 tablet by mouth 2 times daily   Yes Doretha Mccarthy MD   cetirizine (ZYRTEC) 10 MG tablet Take 1 tablet by mouth daily   Yes Doretha Mccarthy MD   clonazePAM (KLONOPIN) 0.5 MG tablet Take 0.5 tablets by mouth 2 times daily as needed for Anxiety.   Yes Doretha Mccarthy MD   diclofenac sodium (VOLTAREN) 1 % GEL Apply 4 g topically 4 times daily as needed for Pain   Yes Doretha Mccarthy MD   fluconazole (DIFLUCAN) 100 MG tablet Take 2 tablets by mouth daily For 14 days stop on 8/27/24   Yes Doretha Mccarthy MD   fluticasone (FLONASE) 50 MCG/ACT nasal spray 2 sprays by Each Nostril route daily   Yes Doretha Mccarthy MD   guaiFENesin (ROBITUSSIN) 100 MG/5ML syrup Take 10 mLs by mouth 4 times daily as needed for Cough   Yes Doretha Mccarthy MD   heparin, porcine, 5000 UNIT/ML injection Inject 1 mL into the skin every 8 hours   Yes Doretha Mccarthy MD   hydrALAZINE (APRESOLINE) 25 MG tablet Take  Oral, 4x Daily PC & HS, Kitty Phelps APRN - CNP, 500,000 Units at 08/18/24 0845    pantoprazole (PROTONIX) tablet 40 mg, 40 mg, Oral, QAM AC, Kitty Phelps APRN - CNP, 40 mg at 08/19/24 0713    senna (SENOKOT) tablet 8.6 mg, 1 tablet, Oral, Daily, Kitty Phelps APRN - CNP, 8.6 mg at 08/18/24 0847    simethicone (MYLICON) chewable tablet 80 mg, 80 mg, Oral, Q6H PRN, Kitty Phelps APRN - CNP    Sodium Hypochlorite (DAKINS) 0.25 % half strength external soln, , Irrigation, Daily, Kitty Phelps APRN - CNP, Given at 08/18/24 1112    sorbitol 70 % liquid 30 mL, 30 mL, Oral, Daily PRN, Kitty Phelps APRN - CNP    tamsulosin (FLOMAX) capsule 0.8 mg, 0.8 mg, Oral, Daily, Kitty Phelps APRN - CNP, 0.8 mg at 08/19/24 0925    vitamin E capsule 400 Units, 400 Units, Oral, BID, Kitty Phelps APRN - CNP, 400 Units at 08/19/24 0943    zinc sulfate (ZINCATE) 220 mg capsule - elemental zinc 50 mg, 50 mg, Oral, Daily, Kitty Phelps APRN - CNP, 50 mg at 08/19/24 0926    dexAMETHasone (DECADRON) tablet 6 mg, 6 mg, Oral, Daily, Kitty Phelps APRN - CNP, 6 mg at 08/19/24 0943    azithromycin (ZITHROMAX) tablet 250 mg, 250 mg, Oral, Daily, Kitty Phelps APRN - CNP, 250 mg at 08/18/24 1623    insulin glargine (LANTUS) injection vial 32 Units, 32 Units, SubCUTAneous, Nightly, Kitty Phelps APRN - CNP, 32 Units at 08/18/24 2140    insulin glargine (LANTUS) injection vial 80 Units, 80 Units, SubCUTAneous, Daily, Kitty Phelps APRN - CNP, 80 Units at 08/19/24 0926    insulin lispro (HUMALOG,ADMELOG) injection vial 0-16 Units, 0-16 Units, SubCUTAneous, TID WC, Kitty Phelps, APRN - CNP, 4 Units at 08/19/24 1206    insulin lispro (HUMALOG,ADMELOG) injection vial 0-4 Units, 0-4 Units, SubCUTAneous, Nightly, Kitty Phelps APRN - CNP, 4 Units at 08/18/24 2353    azelastine (ASTELIN) 0.1 % nasal spray 2 spray, 2 spray, Each Nostril, BID, Ed Pino MD    LAB DATA  Romberg sign.        ASSESSMENT & PLAN number melatonin    COVID-19  Volume/fluid overload  Diabetes  COPD  Anemia  Hyperkalemia  Acute CHF  Respiratory acidosis with metabolic alkalosis  Acute kidney injury on chronic kidney disease stage III  Sleep apnea  Hypertension  Arthritis  Chronic stasis ulcers bilaterally legs  Hyperkalemia    Vitamin C 1000 mg oral twice daily  Aspirin 81 Mg oral daily  Lipitor 40 Mg oral nightly  Astelin 0.1% nasal spray 2 spray each nostril 2 times daily  Azithromycin 250 mg oral Daily  B complex vitamin capsule oral daily  Symbicort 80-4.5 inhalation 2 puff  Spiriva Respimat 2.5 mcg/ACT inhaler 2 puff daily  Tums 500 mg oral 2 times daily  Zyrtec 10 mg oral nightly  Decadron 6 Mg oral daily  Iron tablet 325 mg oral Daily  Diflucan 200 mg oral Daily  Flonase 50 mm CG/ACT nasal spray 2 spray each nostril daily  Lasix injection 60 mg intervenous 3 times daily  Neurontin 400 mg oral twice daily  Lantus injection 32 units subcutaneous nightly  Lantus injection 80 units subcutaneous daily  Lidocaine 4% external patch every 12 hours daily.  Zyvox 600 mg oral every 12 hours  Mag-ox 400 Mg oral daily  Melatonin 3 mg nightly  Nystatin 500,000 units oral 4 times daily  Protonix 40 Mg oral daily  SENOKOT 8.6 mg oral Daily  Flomax 0.8 Mg oral daily  Zinc sulfate 50 mg oral Daily    Kayexalate 30 g    COVID-19 detected on PCR    EKG demonstrates ventricular paced rhythm    Chest x-ray performed 8/15-cardiomegaly and mild pulmonary edema    Consulted nephrology 8/15    Respiratory culture 8/17-Gram stain demonstrates 1+ WBCs, 3+ gram-negative rods, occasional gram-positive cocci in pairs    Consulted cardiology 8/19-once COVID infection test is negative we will proceed with echocardiogram  ________________________________________________________________________    Signed: Ed Pino MD

## 2024-08-20 ENCOUNTER — APPOINTMENT (OUTPATIENT)
Facility: HOSPITAL | Age: 69
End: 2024-08-20
Payer: MEDICARE

## 2024-08-20 PROBLEM — J96.21 ACUTE AND CHRONIC RESPIRATORY FAILURE WITH HYPOXIA (HCC): Status: ACTIVE | Noted: 2024-08-20

## 2024-08-20 LAB
BASOPHILS # BLD: 0 K/UL (ref 0–0.1)
BASOPHILS NFR BLD: 0 % (ref 0–1)
CRP SERPL-MCNC: 3.22 MG/DL (ref 0–0.3)
DIFFERENTIAL METHOD BLD: ABNORMAL
ECHO AV MEAN GRADIENT: 2 MMHG
ECHO AV MEAN VELOCITY: 0.7 M/S
ECHO AV PEAK GRADIENT: 4 MMHG
ECHO AV PEAK VELOCITY: 1 M/S
ECHO AV VTI: 20 CM
ECHO BSA: 2.88 M2
ECHO EST RA PRESSURE: 8 MMHG
ECHO LV E' LATERAL VELOCITY: 7 CM/S
ECHO LV E' SEPTAL VELOCITY: 11 CM/S
ECHO LV EF PHYSICIAN: 35 %
ECHO MV A VELOCITY: 0.65 M/S
ECHO MV E VELOCITY: 1.04 M/S
ECHO MV E/A RATIO: 1.6
ECHO MV E/E' LATERAL: 14.86
ECHO MV E/E' RATIO (AVERAGED): 12.16
ECHO MV E/E' SEPTAL: 9.45
ECHO MV REGURGITANT PEAK GRADIENT: 29 MMHG
ECHO MV REGURGITANT PEAK VELOCITY: 2.7 M/S
ECHO MV REGURGITANT VTIA: 87.3 CM
ECHO PV MAX VELOCITY: 0.7 M/S
ECHO PV MEAN GRADIENT: 1 MMHG
ECHO PV MEAN VELOCITY: 0.5 M/S
ECHO PV PEAK GRADIENT: 2 MMHG
ECHO PV VTI: 13.1 CM
ECHO RIGHT VENTRICULAR SYSTOLIC PRESSURE (RVSP): 36 MMHG
ECHO RV BASAL DIMENSION: 3.9 CM
ECHO RV FREE WALL PEAK S': 11 CM/S
ECHO RV MID DIMENSION: 3.5 CM
ECHO TV REGURGITANT MAX VELOCITY: 2.64 M/S
ECHO TV REGURGITANT PEAK GRADIENT: 28 MMHG
EOSINOPHIL # BLD: 0 K/UL (ref 0–0.4)
EOSINOPHIL NFR BLD: 0 % (ref 0–7)
ERYTHROCYTE [DISTWIDTH] IN BLOOD BY AUTOMATED COUNT: 19.7 % (ref 11.5–14.5)
GLUCOSE BLD STRIP.AUTO-MCNC: 204 MG/DL (ref 65–100)
GLUCOSE BLD STRIP.AUTO-MCNC: 259 MG/DL (ref 65–100)
GLUCOSE BLD STRIP.AUTO-MCNC: 269 MG/DL (ref 65–100)
GLUCOSE BLD STRIP.AUTO-MCNC: 271 MG/DL (ref 65–100)
HCT VFR BLD AUTO: 32.1 % (ref 36.6–50.3)
HGB BLD-MCNC: 9.9 G/DL (ref 12.1–17)
IMM GRANULOCYTES # BLD AUTO: 0 K/UL
IMM GRANULOCYTES NFR BLD AUTO: 0 %
LYMPHOCYTES # BLD: 1.7 K/UL (ref 0.8–3.5)
LYMPHOCYTES NFR BLD: 17 % (ref 12–49)
MCH RBC QN AUTO: 29.5 PG (ref 26–34)
MCHC RBC AUTO-ENTMCNC: 30.8 G/DL (ref 30–36.5)
MCV RBC AUTO: 95.5 FL (ref 80–99)
METAMYELOCYTES NFR BLD MANUAL: 2 %
MONOCYTES # BLD: 0.2 K/UL (ref 0–1)
MONOCYTES NFR BLD: 2 % (ref 5–13)
MYELOCYTES NFR BLD MANUAL: 1 %
NEUTS BAND NFR BLD MANUAL: 1 % (ref 0–6)
NEUTS SEG # BLD: 7.9 K/UL (ref 1.8–8)
NEUTS SEG NFR BLD: 77 % (ref 32–75)
NRBC # BLD: 0.31 K/UL (ref 0–0.01)
NRBC BLD-RTO: 3.1 PER 100 WBC
PERFORMED BY:: ABNORMAL
PLATELET # BLD AUTO: 235 K/UL (ref 150–400)
PMV BLD AUTO: 10.9 FL (ref 8.9–12.9)
PROCALCITONIN SERPL-MCNC: 0.12 NG/ML
RBC # BLD AUTO: 3.36 M/UL (ref 4.1–5.7)
RBC MORPH BLD: ABNORMAL
WBC # BLD AUTO: 10.1 K/UL (ref 4.1–11.1)

## 2024-08-20 PROCEDURE — 36415 COLL VENOUS BLD VENIPUNCTURE: CPT

## 2024-08-20 PROCEDURE — 2580000003 HC RX 258: Performed by: NURSE PRACTITIONER

## 2024-08-20 PROCEDURE — 97530 THERAPEUTIC ACTIVITIES: CPT

## 2024-08-20 PROCEDURE — 6370000000 HC RX 637 (ALT 250 FOR IP): Performed by: NURSE PRACTITIONER

## 2024-08-20 PROCEDURE — 99232 SBSQ HOSP IP/OBS MODERATE 35: CPT | Performed by: INTERNAL MEDICINE

## 2024-08-20 PROCEDURE — 94761 N-INVAS EAR/PLS OXIMETRY MLT: CPT

## 2024-08-20 PROCEDURE — 2580000003 HC RX 258: Performed by: INTERNAL MEDICINE

## 2024-08-20 PROCEDURE — 2500000003 HC RX 250 WO HCPCS: Performed by: FAMILY MEDICINE

## 2024-08-20 PROCEDURE — 6370000000 HC RX 637 (ALT 250 FOR IP): Performed by: INTERNAL MEDICINE

## 2024-08-20 PROCEDURE — 1100000000 HC RM PRIVATE

## 2024-08-20 PROCEDURE — 6370000000 HC RX 637 (ALT 250 FOR IP): Performed by: PSYCHIATRY & NEUROLOGY

## 2024-08-20 PROCEDURE — 6360000002 HC RX W HCPCS: Performed by: NURSE PRACTITIONER

## 2024-08-20 PROCEDURE — 85025 COMPLETE CBC W/AUTO DIFF WBC: CPT

## 2024-08-20 PROCEDURE — 6360000002 HC RX W HCPCS: Performed by: INTERNAL MEDICINE

## 2024-08-20 PROCEDURE — 82962 GLUCOSE BLOOD TEST: CPT

## 2024-08-20 PROCEDURE — 94640 AIRWAY INHALATION TREATMENT: CPT

## 2024-08-20 PROCEDURE — 6370000000 HC RX 637 (ALT 250 FOR IP): Performed by: FAMILY MEDICINE

## 2024-08-20 PROCEDURE — 2700000000 HC OXYGEN THERAPY PER DAY

## 2024-08-20 PROCEDURE — 84145 PROCALCITONIN (PCT): CPT

## 2024-08-20 PROCEDURE — 71045 X-RAY EXAM CHEST 1 VIEW: CPT

## 2024-08-20 PROCEDURE — 86140 C-REACTIVE PROTEIN: CPT

## 2024-08-20 PROCEDURE — 99222 1ST HOSP IP/OBS MODERATE 55: CPT | Performed by: PSYCHIATRY & NEUROLOGY

## 2024-08-20 RX ORDER — PROPRANOLOL HYDROCHLORIDE 40 MG/1
40 TABLET ORAL 2 TIMES DAILY
Status: DISCONTINUED | OUTPATIENT
Start: 2024-08-20 | End: 2024-08-24 | Stop reason: HOSPADM

## 2024-08-20 RX ORDER — LACTOBACILLUS RHAMNOSUS GG 10B CELL
1 CAPSULE ORAL
Status: DISCONTINUED | OUTPATIENT
Start: 2024-08-21 | End: 2024-08-24 | Stop reason: HOSPADM

## 2024-08-20 RX ADMIN — OXYCODONE HYDROCHLORIDE AND ACETAMINOPHEN 1000 MG: 500 TABLET ORAL at 21:50

## 2024-08-20 RX ADMIN — OXYCODONE HYDROCHLORIDE 10 MG: 10 TABLET ORAL at 06:43

## 2024-08-20 RX ADMIN — INSULIN GLARGINE 80 UNITS: 100 INJECTION, SOLUTION SUBCUTANEOUS at 09:57

## 2024-08-20 RX ADMIN — OXYCODONE HYDROCHLORIDE AND ACETAMINOPHEN 1000 MG: 500 TABLET ORAL at 09:46

## 2024-08-20 RX ADMIN — INSULIN LISPRO 8 UNITS: 100 INJECTION, SOLUTION INTRAVENOUS; SUBCUTANEOUS at 12:09

## 2024-08-20 RX ADMIN — PROPRANOLOL HYDROCHLORIDE 40 MG: 40 TABLET ORAL at 21:49

## 2024-08-20 RX ADMIN — Medication 1 LOZENGE: at 11:06

## 2024-08-20 RX ADMIN — PROPRANOLOL HYDROCHLORIDE 40 MG: 40 TABLET ORAL at 12:52

## 2024-08-20 RX ADMIN — INSULIN LISPRO 8 UNITS: 100 INJECTION, SOLUTION INTRAVENOUS; SUBCUTANEOUS at 16:34

## 2024-08-20 RX ADMIN — OXYCODONE HYDROCHLORIDE 10 MG: 10 TABLET ORAL at 15:19

## 2024-08-20 RX ADMIN — FLUTICASONE PROPIONATE 2 SPRAY: 50 SPRAY, METERED NASAL at 09:59

## 2024-08-20 RX ADMIN — INSULIN LISPRO 8 UNITS: 100 INJECTION, SOLUTION INTRAVENOUS; SUBCUTANEOUS at 09:57

## 2024-08-20 RX ADMIN — INSULIN GLARGINE 32 UNITS: 100 INJECTION, SOLUTION SUBCUTANEOUS at 21:52

## 2024-08-20 RX ADMIN — GABAPENTIN 400 MG: 400 CAPSULE ORAL at 21:51

## 2024-08-20 RX ADMIN — Medication 1 LOZENGE: at 06:43

## 2024-08-20 RX ADMIN — CLOTRIMAZOLE 10 MG: 10 LOZENGE ORAL at 09:45

## 2024-08-20 RX ADMIN — GABAPENTIN 400 MG: 400 CAPSULE ORAL at 09:47

## 2024-08-20 RX ADMIN — HYDRALAZINE HYDROCHLORIDE 10 MG: 10 TABLET ORAL at 06:43

## 2024-08-20 RX ADMIN — Medication 2 PUFF: at 08:24

## 2024-08-20 RX ADMIN — CETIRIZINE HYDROCHLORIDE 10 MG: 10 TABLET, FILM COATED ORAL at 21:49

## 2024-08-20 RX ADMIN — ZINC SULFATE 220 MG (50 MG) CAPSULE 50 MG: CAPSULE at 09:46

## 2024-08-20 RX ADMIN — CLOTRIMAZOLE 10 MG: 10 LOZENGE ORAL at 12:12

## 2024-08-20 RX ADMIN — ISOSORBIDE DINITRATE 20 MG: 5 TABLET ORAL at 18:17

## 2024-08-20 RX ADMIN — PANTOPRAZOLE SODIUM 40 MG: 40 TABLET, DELAYED RELEASE ORAL at 06:43

## 2024-08-20 RX ADMIN — Medication 400 MG: at 09:45

## 2024-08-20 RX ADMIN — Medication 3 MG: at 21:50

## 2024-08-20 RX ADMIN — ISOSORBIDE DINITRATE 20 MG: 5 TABLET ORAL at 09:47

## 2024-08-20 RX ADMIN — CEFTRIAXONE SODIUM 2000 MG: 2 INJECTION, POWDER, FOR SOLUTION INTRAMUSCULAR; INTRAVENOUS at 15:21

## 2024-08-20 RX ADMIN — ASPIRIN 81 MG CHEWABLE TABLET 81 MG: 81 TABLET CHEWABLE at 09:41

## 2024-08-20 RX ADMIN — DEXAMETHASONE 6 MG: 4 TABLET ORAL at 09:43

## 2024-08-20 RX ADMIN — SENNOSIDES 8.6 MG: 8.6 TABLET, FILM COATED ORAL at 09:44

## 2024-08-20 RX ADMIN — GABAPENTIN 400 MG: 400 CAPSULE ORAL at 12:11

## 2024-08-20 RX ADMIN — WHITE PETROLATUM,ZINC OXIDE: 57; 17 PASTE TOPICAL at 21:56

## 2024-08-20 RX ADMIN — HYDRALAZINE HYDROCHLORIDE 10 MG: 10 TABLET ORAL at 12:10

## 2024-08-20 RX ADMIN — FLUCONAZOLE 200 MG: 100 TABLET ORAL at 09:42

## 2024-08-20 RX ADMIN — CALCIUM CARBONATE 500 MG: 500 TABLET, CHEWABLE ORAL at 09:41

## 2024-08-20 RX ADMIN — ISOSORBIDE DINITRATE 20 MG: 5 TABLET ORAL at 12:11

## 2024-08-20 RX ADMIN — Medication 1 CAPSULE: at 09:44

## 2024-08-20 RX ADMIN — Medication 1 MG: at 09:45

## 2024-08-20 RX ADMIN — HYDRALAZINE HYDROCHLORIDE 10 MG: 10 TABLET ORAL at 21:50

## 2024-08-20 RX ADMIN — FERROUS SULFATE TAB 325 MG (65 MG ELEMENTAL FE) 325 MG: 325 (65 FE) TAB at 09:45

## 2024-08-20 RX ADMIN — Medication 400 UNITS: at 21:48

## 2024-08-20 RX ADMIN — ATORVASTATIN CALCIUM 40 MG: 40 TABLET, FILM COATED ORAL at 21:50

## 2024-08-20 RX ADMIN — CLOTRIMAZOLE 10 MG: 10 LOZENGE ORAL at 21:51

## 2024-08-20 RX ADMIN — Medication 2 PUFF: at 21:09

## 2024-08-20 RX ADMIN — Medication 400 UNITS: at 09:43

## 2024-08-20 RX ADMIN — SODIUM CHLORIDE, PRESERVATIVE FREE 5 ML: 5 INJECTION INTRAVENOUS at 09:59

## 2024-08-20 RX ADMIN — TAMSULOSIN HYDROCHLORIDE 0.8 MG: 0.4 CAPSULE ORAL at 09:46

## 2024-08-20 RX ADMIN — OXYCODONE HYDROCHLORIDE 10 MG: 10 TABLET ORAL at 23:33

## 2024-08-20 RX ADMIN — Medication 1 LOZENGE: at 15:18

## 2024-08-20 RX ADMIN — WHITE PETROLATUM,ZINC OXIDE: 57; 17 PASTE TOPICAL at 11:32

## 2024-08-20 RX ADMIN — Medication 1 LOZENGE: at 19:14

## 2024-08-20 RX ADMIN — CALCIUM CARBONATE 500 MG: 500 TABLET, CHEWABLE ORAL at 21:49

## 2024-08-20 RX ADMIN — SODIUM CHLORIDE, PRESERVATIVE FREE 10 ML: 5 INJECTION INTRAVENOUS at 21:58

## 2024-08-20 ASSESSMENT — PAIN DESCRIPTION - LOCATION: LOCATION: THROAT

## 2024-08-20 ASSESSMENT — PAIN DESCRIPTION - FREQUENCY
FREQUENCY: INTERMITTENT
FREQUENCY: CONTINUOUS

## 2024-08-20 ASSESSMENT — PAIN SCALES - GENERAL
PAINLEVEL_OUTOF10: 10
PAINLEVEL_OUTOF10: 8
PAINLEVEL_OUTOF10: 5
PAINLEVEL_OUTOF10: 10

## 2024-08-20 ASSESSMENT — PAIN DESCRIPTION - PAIN TYPE
TYPE: CHRONIC PAIN
TYPE: CHRONIC PAIN

## 2024-08-20 ASSESSMENT — PAIN DESCRIPTION - ORIENTATION
ORIENTATION: OTHER (COMMENT)
ORIENTATION: OTHER (COMMENT)

## 2024-08-20 ASSESSMENT — PAIN DESCRIPTION - DESCRIPTORS
DESCRIPTORS: OTHER (COMMENT)
DESCRIPTORS: OTHER (COMMENT)

## 2024-08-20 ASSESSMENT — PAIN SCALES - WONG BAKER
WONGBAKER_NUMERICALRESPONSE: HURTS WORST
WONGBAKER_NUMERICALRESPONSE: HURTS WHOLE LOT

## 2024-08-20 ASSESSMENT — PAIN DESCRIPTION - ONSET
ONSET: GRADUAL
ONSET: GRADUAL

## 2024-08-20 ASSESSMENT — PAIN - FUNCTIONAL ASSESSMENT
PAIN_FUNCTIONAL_ASSESSMENT: ACTIVITIES ARE NOT PREVENTED
PAIN_FUNCTIONAL_ASSESSMENT: ACTIVITIES ARE NOT PREVENTED

## 2024-08-20 NOTE — PROGRESS NOTES
daily   Yes Doretha Mccarthy MD   cetirizine (ZYRTEC) 10 MG tablet Take 1 tablet by mouth daily   Yes Doretha Mccarthy MD   clonazePAM (KLONOPIN) 0.5 MG tablet Take 0.5 tablets by mouth 2 times daily as needed for Anxiety.   Yes Doretha Mccarthy MD   diclofenac sodium (VOLTAREN) 1 % GEL Apply 4 g topically 4 times daily as needed for Pain   Yes Doretha Mccarthy MD   fluconazole (DIFLUCAN) 100 MG tablet Take 2 tablets by mouth daily For 14 days stop on 8/27/24   Yes Doretha Mccarthy MD   fluticasone (FLONASE) 50 MCG/ACT nasal spray 2 sprays by Each Nostril route daily   Yes Doretha Mccarthy MD   guaiFENesin (ROBITUSSIN) 100 MG/5ML syrup Take 10 mLs by mouth 4 times daily as needed for Cough   Yes Doretha Mccarthy MD   heparin, porcine, 5000 UNIT/ML injection Inject 1 mL into the skin every 8 hours   Yes Doretha Mccarthy MD   hydrALAZINE (APRESOLINE) 25 MG tablet Take 1 tablet by mouth every 6 hours as needed (elevated blood pressure)   Yes Doretha Mccarthy MD   hydrOXYzine HCl (ATARAX) 10 MG tablet Take 1 tablet by mouth every 6 hours as needed for Anxiety   Yes Doretha Mccarthy MD   insulin regular (HUMULIN R;NOVOLIN R) 100 UNIT/ML injection Inject into the skin See Admin Instructions Sliding scale   Yes Doretha Mccarthy MD   ipratropium (ATROVENT HFA) 17 MCG/ACT inhaler Inhale 2 puffs into the lungs every 6 hours   Yes Doretha Mccarthy MD   lidocaine (LIDODERM) 5 % Place 1 patch onto the skin daily 12 hours on, 12 hours off.   Yes Doretha Mccarthy MD   linezolid (ZYVOX) 600 MG tablet Take 1 tablet by mouth 2 times daily   Yes Doretha Mccarthy MD   loratadine (CLARITIN) 10 MG tablet Take 1 tablet by mouth daily   Yes Doretha Mccarthy MD   magnesium oxide (MAG-OX) 400 (240 Mg) MG tablet Take 1 tablet by mouth daily   Yes Doretha Mccarthy MD   melatonin 3 MG TABS tablet Take 1 tablet by mouth daily   Yes Doretha Mccarthy MD   Multiple  nightly 7/30/24  Yes Ed Pino MD   ferrous sulfate (IRON 325) 325 (65 Fe) MG tablet Take 1 tablet by mouth daily (with breakfast)   Yes Doretha Mccarthy MD   potassium chloride (KLOR-CON) 20 MEQ packet Take 40 mEq by mouth 2 times daily   Yes Doretha Mccarthy MD   pantoprazole (PROTONIX) 40 MG tablet Take 1 tablet by mouth every morning (before breakfast) 4/3/24  Yes Ed Pino MD   oxyCODONE (ROXICODONE) 5 MG immediate release tablet Take 2 tablets by mouth 4 times daily.   Yes Doretha Mccarthy MD   gabapentin (NEURONTIN) 400 MG capsule Take 1 capsule by mouth 4 times daily.   Yes Doretha Mccarthy MD   allopurinol (ZYLOPRIM) 100 MG tablet Take 2 tablets by mouth daily 11/7/23 8/15/24 Yes Sergei Kolb MD   bumetanide (BUMEX) 2 MG tablet Take 1 tablet by mouth 2 times daily 8/11/23  Yes Doretha Mccarthy MD   albuterol sulfate HFA (PROVENTIL;VENTOLIN;PROAIR) 108 (90 Base) MCG/ACT inhaler Inhale 1 puff into the lungs every 4 hours as needed 9/14/22  Yes Doretha Mccarthy MD   ascorbic acid (VITAMIN C) 500 MG tablet Take 2 tablets by mouth 2 times daily   Yes Doretha Mccarthy MD   aspirin 81 MG chewable tablet Take 1 tablet by mouth daily   Yes Doretha Mccarthy MD   atorvastatin (LIPITOR) 40 MG tablet Take 1 tablet by mouth nightly at bedtime. 4/24/23  Yes Doretha Mccarthy MD   B Complex Vitamins (VITAMIN B COMPLEX) TABS Take 1 tablet by mouth daily   Yes Doretha Mccarthy MD   flunisolide (NASALIDE) 25 MCG/ACT (0.025%) SOLN 2 sprays 2 times daily 9/9/22  Yes Doretha Mccarthy MD   fluticasone-umeclidin-vilant (TRELEGY ELLIPTA) 100-62.5-25 MCG/ACT AEPB inhaler INHALE 1 INHALATION BY MOUTH ONCE DAILY 1/4/22  Yes Doretha Mccarthy MD   vitamin E 400 UNIT capsule Take 1 capsule by mouth 2 times daily 6/8/21  Yes Doretha Mccarthy MD         Current Facility-Administered Medications:     [START ON 8/21/2024] lactobacillus (CULTURELLE)  Velocity 2.64 m/s    TR Peak Gradient 28 mmHg   POCT Glucose    Collection Time: 08/19/24  8:13 PM   Result Value Ref Range    POC Glucose 336 (H) 65 - 100 mg/dL    Performed by: [employer]    POCT Glucose    Collection Time: 08/20/24  8:17 AM   Result Value Ref Range    POC Glucose 259 (H) 65 - 100 mg/dL    Performed by: Dustin Hart    CBC with Auto Differential    Collection Time: 08/20/24 10:39 AM   Result Value Ref Range    WBC 10.1 4.1 - 11.1 K/uL    RBC 3.36 (L) 4.10 - 5.70 M/uL    Hemoglobin 9.9 (L) 12.1 - 17.0 g/dL    Hematocrit 32.1 (L) 36.6 - 50.3 %    MCV 95.5 80.0 - 99.0 FL    MCH 29.5 26.0 - 34.0 PG    MCHC 30.8 30.0 - 36.5 g/dL    RDW 19.7 (H) 11.5 - 14.5 %    Platelets 235 150 - 400 K/uL    MPV 10.9 8.9 - 12.9 FL    Nucleated RBCs 3.1 (H) 0.0  WBC    nRBC 0.31 (H) 0.00 - 0.01 K/uL    Neutrophils % PENDING %    Lymphocytes % PENDING %    Monocytes % PENDING %    Eosinophils % PENDING %    Basophils % PENDING %    Immature Granulocytes % PENDING %    Neutrophils Absolute PENDING K/UL    Lymphocytes Absolute PENDING K/UL    Monocytes Absolute PENDING K/UL    Eosinophils Absolute PENDING K/UL    Basophils Absolute PENDING K/UL    Immature Granulocytes Absolute PENDING K/UL    Differential Type PENDING    C-Reactive Protein    Collection Time: 08/20/24 10:39 AM   Result Value Ref Range    CRP 3.22 (H) 0.00 - 0.30 mg/dL           Xray Result (most recent):  XR CHEST PORTABLE    Result Date: 8/15/2024  Cardiomegaly and mild pulmonary edema. Electronically signed by Nasir Sebastian       XR CHEST PORTABLE   Final Result      Resolved pulmonary edema.         Electronically signed by Madeline Barrett      XR CHEST PORTABLE   Final Result   Cardiomegaly and mild pulmonary edema.      Electronically signed by Nasir Sebastian           Review of Systems:  Constitutional: Negative for chills and fever.   HENT: Negative.    Eyes: Negative.    Respiratory: Negative.    Cardiovascular: Negative.    Gastrointestinal:  Negative for abdominal pain and nausea.   Skin: Negative.    Neurological: Negative.      Objective:   VITALS:    Vitals:    08/20/24 0826   BP:    Pulse: 74   Resp: 18   Temp:    SpO2: 100%       Physical Exam:   Constitutional: pt is oriented to person, place, and time.   HENT:   Head: Normocephalic and atraumatic.   Eyes: Pupils are equal, round, and reactive to light. EOM are normal.   Cardiovascular: Normal rate, regular rhythm and normal heart sounds.   Pulmonary/Chest: Breath sounds normal. No wheezes. No rales.   Exhibits no tenderness.   Abdominal: Soft. Bowel sounds are normal. There is no abdominal tenderness. There is no rebound and no guarding.   Musculoskeletal: Normal range of motion.   Neurological: pt is alert and oriented to person, place, and time. Alert. Normal strength. No cranial nerve deficit or sensory deficit. Displays a negative Romberg sign.        ASSESSMENT & PLAN number melatonin    COVID-19  Volume/fluid overload  Diabetes  COPD  Anemia  Hyperkalemia  Acute CHF  Respiratory acidosis with metabolic alkalosis  Acute kidney injury on chronic kidney disease stage III  Sleep apnea  Hypertension  Arthritis  Chronic stasis ulcers bilaterally legs  Hyperkalemia    Vitamin C 1000 mg oral twice daily  Aspirin 81 Mg oral daily  Lipitor 40 Mg oral nightly  Astelin 0.1% nasal spray 2 spray each nostril 2 times daily  Azithromycin 250 mg oral Daily  B complex vitamin capsule oral daily  Symbicort 80-4.5 inhalation 2 puff  Spiriva Respimat 2.5 mcg/ACT inhaler 2 puff daily  Tums 500 mg oral 2 times daily  Zyrtec 10 mg oral nightly  Decadron 6 Mg oral daily  Iron tablet 325 mg oral Daily  Diflucan 200 mg oral Daily  Flonase 50 mm CG/ACT nasal spray 2 spray each nostril daily  Lasix injection 60 mg intervenous 3 times daily  Neurontin 400 mg oral twice daily  Lantus injection 32 units subcutaneous nightly  Lantus injection 80 units subcutaneous daily  Lidocaine 4% external patch every 12 hours

## 2024-08-20 NOTE — PROGRESS NOTES
Progress Note      8/20/2024 8:32 AM  NAME: Shantanu Casillas   MRN:  [id number]   Admit Diagnosis: DEMARCO (acute kidney injury) (HCC) [N17.9]  Fluid overload [E87.70]  Volume overload [E87.70]  Hypervolemia, unspecified hypervolemia type [E87.70]  COVID-19 [U07.1]      Problem List:   -Admitted to the hospital with shortness of breath  -COVID-19 infection.  -COPD  -History of coronary artery disease details unavailable at this time  -History of congestive heart failure with last known ejection fraction of 40 to 45% as of 2023  -Sleep apnea  -Hypertension  -Diabetes mellitus  -Arthritis         Assessment/Plan:   Note dictated August 28, 2024  -Patient is lying comfortably in the bed.  -COVID-19 testing is pending.  -Once results are available we will obtain an echocardiogram for the evaluation of cardiomyopathy and manage him appropriately based on guidelines recommendation.  -Detail examination deferred on today's visit.  -We will continue to follow as discussed above and optimize guideline directed medical management for cardiomyopathy as clinically indicated.  ===============================================================  Note dictated August 19, 2024  -Patient lying comfortably in the bed.  No clinical interval changes from yesterday and patient is otherwise clinically and hemodynamically stable and receiving symptomatic treatment for COVID-19 infection.  -Once COVID infection test is negative we will proceed with echocardiogram.  -Continue to optimize guideline directed medical management for cardiomyopathy and increase doses as tolerated per hemodynamics.  ===============================================================    -68-year-old white male with history of cardiomyopathy the details of which is not known to me at this time admitted to the hospital with shortness of breath and found to have COVID-19 infection.  -Patient has history of COPD hypertension diabetes mellitus arthritis and sleep apnea  60 mg  60 mg IntraVENous Daily    Petrolatum-Zinc Oxide ointment   Topical BID    sodium chloride flush 0.9 % injection 5-40 mL  5-40 mL IntraVENous 2 times per day    sodium chloride flush 0.9 % injection 5-40 mL  5-40 mL IntraVENous PRN    0.9 % sodium chloride infusion   IntraVENous PRN    potassium chloride (KLOR-CON M) extended release tablet 40 mEq  40 mEq Oral PRN    Or    potassium bicarb-citric acid (EFFER-K) effervescent tablet 40 mEq  40 mEq Oral PRN    Or    potassium chloride 10 mEq/100 mL IVPB (Peripheral Line)  10 mEq IntraVENous PRN    magnesium sulfate 2000 mg in 50 mL IVPB premix  2,000 mg IntraVENous PRN    ondansetron (ZOFRAN-ODT) disintegrating tablet 4 mg  4 mg Oral Q8H PRN    Or    ondansetron (ZOFRAN) injection 4 mg  4 mg IntraVENous Q6H PRN    polyethylene glycol (GLYCOLAX) packet 17 g  17 g Oral Daily PRN    acetaminophen (TYLENOL) tablet 650 mg  650 mg Oral Q6H PRN    Or    acetaminophen (TYLENOL) suppository 650 mg  650 mg Rectal Q6H PRN    albuterol sulfate HFA (PROVENTIL;VENTOLIN;PROAIR) 108 (90 Base) MCG/ACT inhaler 1 puff  1 puff Inhalation Q4H PRN    ascorbic acid (VITAMIN C) tablet 1,000 mg  1,000 mg Oral BID    aspirin chewable tablet 81 mg  81 mg Oral Daily    atorvastatin (LIPITOR) tablet 40 mg  40 mg Oral Nightly    b complex vitamins capsule 1 capsule  1 capsule Oral Daily    Benzocaine-Menthol (CEPACOL) 1 lozenge  1 lozenge Oral Q2H PRN    calcium carbonate (TUMS) chewable tablet 500 mg  1 tablet Oral BID    cetirizine (ZYRTEC) tablet 10 mg  10 mg Oral Nightly    clonazePAM (KLONOPIN) tablet 0.25 mg  0.25 mg Oral BID PRN    ferrous sulfate (IRON 325) tablet 325 mg  325 mg Oral Daily with breakfast    fluconazole (DIFLUCAN) tablet 200 mg  200 mg Oral Daily    fluticasone (FLONASE) 50 MCG/ACT nasal spray 2 spray  2 spray Each Nostril Daily    budesonide-formoterol (SYMBICORT) 80-4.5 MCG/ACT inhaler 2 puff  2 puff Inhalation BID RT    And    tiotropium (SPIRIVA RESPIMAT) 2.5

## 2024-08-20 NOTE — PROGRESS NOTES
Nephrology follow up          Patient: Shantanu Casillas MRN: [id number]  SSN: [id number]    YOB: 1955  Age: 68 y.o.  Sex: male      Subjective:   The pt is seen in the room.  Blood pressures are good  Afebrile  On nasal cannula 3 L  Resolving lower extremity swelling  resolving DEMARCO  Potassium 5.2->4.7.  Past Medical History:   Diagnosis Date    Arthritis     CAD (coronary artery disease)     Chronic obstructive pulmonary disease (HCC)     Diabetes (HCC)     Gout     Hypertension     Ill-defined condition     Sleep apnea     cpap     Past Surgical History:   Procedure Laterality Date    FOOT DEBRIDEMENT Right 12/15/2023    INCISION AND DRAINAGE RIGHT FIRST METATARSAL PHALANGEAL JOINT performed by Polo Oviedo DPM at [facility] MAIN OR    FOOT SURGERY      right heel - foriegn object    HX VEIN ABLATION ADHESIVE      ORTHOPEDIC SURGERY      back surgery    PACEMAKER      aicd -      Family History   Problem Relation Age of Onset    Diabetes Mother     Heart Disease Father     Hypertension Father     Diabetes Sister     Diabetes Brother     Hypertension Mother     Heart Disease Mother     Kidney Disease Mother      Social History     Tobacco Use    Smoking status: Never    Smokeless tobacco: Never   Substance Use Topics    Alcohol use: Not Currently      Current Facility-Administered Medications   Medication Dose Route Frequency Provider Last Rate Last Admin    [START ON 8/21/2024] lactobacillus (CULTURELLE) capsule 1 capsule  1 capsule Oral Daily with breakfast Ed Pino MD        propranolol (INDERAL) tablet 40 mg  40 mg Oral BID Yue Botello MD   40 mg at 08/20/24 1252    clotrimazole (MYCELEX) matthias 10 mg  10 mg Oral TID Ed Pino MD   10 mg at 08/20/24 1212    cefTRIAXone (ROCEPHIN) 2,000 mg in sterile water 20 mL IV syringe  2,000 mg IntraVENous Q24H Sergei Kolb MD   2,000 mg at 08/20/24 1521    hydrALAZINE (APRESOLINE) tablet 10 mg  10 mg Oral 3 times per  day Ethan Barker MD   10 mg at 08/20/24 1210    isosorbide dinitrate (ISORDIL) tablet 20 mg  20 mg Oral TID Ethan Barker MD   20 mg at 08/20/24 1211    gabapentin (NEURONTIN) capsule 400 mg  400 mg Oral TID Bhupendra Hayden MD   400 mg at 08/20/24 1211    oxyCODONE HCl (OXY-IR) immediate release tablet 10 mg  10 mg Oral q8h Ed Pino MD   10 mg at 08/20/24 1519    [Held by provider] furosemide (LASIX) injection 60 mg  60 mg IntraVENous Daily Bhupendra Hayden MD   60 mg at 08/18/24 0851    Petrolatum-Zinc Oxide ointment   Topical BID Ed Pino MD   Given at 08/20/24 1132    sodium chloride flush 0.9 % injection 5-40 mL  5-40 mL IntraVENous 2 times per day Kitty Phelps APRN - CNP   5 mL at 08/20/24 0959    sodium chloride flush 0.9 % injection 5-40 mL  5-40 mL IntraVENous PRN Kitty Phelps APRN - CNP        0.9 % sodium chloride infusion   IntraVENous PRN Kitty Phelps APRN - CNP        potassium chloride (KLOR-CON M) extended release tablet 40 mEq  40 mEq Oral PRKitty Sims APRN - CNP        Or    potassium bicarb-citric acid (EFFER-K) effervescent tablet 40 mEq  40 mEq Oral PRKitty Sims APRN - CNP        Or    potassium chloride 10 mEq/100 mL IVPB (Peripheral Line)  10 mEq IntraVENous PRN Kitty Phelps APRN - CNP        magnesium sulfate 2000 mg in 50 mL IVPB premix  2,000 mg IntraVENous PRN Kitty Phelps APRN - CNP        ondansetron (ZOFRAN-ODT) disintegrating tablet 4 mg  4 mg Oral Q8H Kitty Lux APRN - CNP        Or    ondansetron (ZOFRAN) injection 4 mg  4 mg IntraVENous Q6H PRKitty Sims APRN - CNP        polyethylene glycol (GLYCOLAX) packet 17 g  17 g Oral Daily PRN Kitty Phelps M, APRN - CNP        acetaminophen (TYLENOL) tablet 650 mg  650 mg Oral Q6H Kitty Lux APRN - CNP   650 mg at 08/19/24 0400    Or    acetaminophen (TYLENOL) suppository 650 mg  650 mg Rectal Q6H PRKitty Sims APRN - CNP         XIOMY, ROBINA - CNP        hydrOXYzine HCl (ATARAX) tablet 10 mg  10 mg Oral Q6H PRN Kitty Phelps APRN - CNP        ipratropium 0.5 mg-albuterol 2.5 mg (DUONEB) nebulizer solution 1 Dose  1 Dose Inhalation Q4H PRN Kitty Phelps APRN - CNP        lidocaine 4 % external patch 1 patch  1 patch TransDERmal Daily Kitty Phelps APRN - CNP   1 patch at 08/20/24 0940    magnesium oxide (MAG-OX) tablet 400 mg  400 mg Oral Daily Kitty Phelps APRN - CNP   400 mg at 08/20/24 0945    melatonin tablet 3 mg  3 mg Oral Nightly Kitty Phelps APRN - CNP   3 mg at 08/19/24 2104    Virt-Caps 1 mg  1 capsule Oral QAM Kitty Phelps APRN - CNP   1 mg at 08/20/24 0945    pantoprazole (PROTONIX) tablet 40 mg  40 mg Oral QAM AC Kitty Phelps APRN - CNP   40 mg at 08/20/24 0643    senna (SENOKOT) tablet 8.6 mg  1 tablet Oral Daily Kitty Phelps APRN - CNP   8.6 mg at 08/20/24 0944    simethicone (MYLICON) chewable tablet 80 mg  80 mg Oral Q6H PRN Kitty Phelps, APRN - CNP        Sodium Hypochlorite (DAKINS) 0.25 % half strength external soln   Irrigation Daily Kitty Phelps APRN - CNP   Given at 08/18/24 1112    sorbitol 70 % liquid 30 mL  30 mL Oral Daily PRN Kitty Phelps APRN - CNP        tamsulosin (FLOMAX) capsule 0.8 mg  0.8 mg Oral Daily Kitty Phelps APRN - CNP   0.8 mg at 08/20/24 0946    vitamin E capsule 400 Units  400 Units Oral BID Kitty Phelps APRN - CNP   400 Units at 08/20/24 0943    zinc sulfate (ZINCATE) 220 mg capsule - elemental zinc 50 mg  50 mg Oral Daily Kitty Phelps APRN - CNP   50 mg at 08/20/24 0946    dexAMETHasone (DECADRON) tablet 6 mg  6 mg Oral Daily Kitty Phelps APRN - CNP   6 mg at 08/20/24 0943    insulin glargine (LANTUS) injection vial 32 Units  32 Units SubCUTAneous Nightly Kitty Phelps APRN - CNP   32 Units at 08/19/24 2102    insulin glargine (LANTUS) injection vial 80 Units  80 Units SubCUTAneous Daily Kitty Phelps APRN

## 2024-08-20 NOTE — CONSULTS
Podiatry Consult Note    Subjective:         Date of Consultation: August 20, 2024     Referring Physician: Alvarez Ward MD     Patient is a 68 y.o.  male who is being seen for right foot ulceration and left leg ulceration.   Patient has a hx of diabetes mellitus, COPD, gout.  Patient is known to me from the wound care center.  Patient was admitted for shortness of breath.  Patient denies any pain to the bilateral lower extremity.    Patient Active Problem List    Diagnosis Date Noted    Volume overload 08/15/2024    Fluid overload 08/15/2024    Ulcer of right foot with fat layer exposed (HCC) 07/19/2024    Generalized edema 02/19/2024    Acute pneumonia 02/13/2024    CAP (community acquired pneumonia) due to Chlamydia species 02/13/2024    Open wound of right foot 10/11/2023    Atherosclerotic heart disease of native coronary artery without angina pectoris 08/28/2023    Chronic obstructive pulmonary disease, unspecified (HCC) 08/28/2023    Chronic pain disorder 08/28/2023    Dependence on supplemental oxygen 08/28/2023    Difficulty in walking, not elsewhere classified 08/28/2023    Gout, unspecified 08/28/2023    Hypertensive heart disease with heart failure (HCC) 08/28/2023    Morbid (severe) obesity due to excess calories (HCC) 08/28/2023    Muscle weakness (generalized) 08/28/2023    Obstructive sleep apnea (adult) (pediatric) 08/28/2023    Other lack of coordination 08/28/2023    Other symptoms and signs involving the musculoskeletal system 08/28/2023    Personal history of COVID-19 08/28/2023    Presence of cardiac pacemaker 08/28/2023    Type 2 diabetes mellitus with diabetic peripheral angiopathy without gangrene (HCC) 08/28/2023    Unspecified cirrhosis of liver (HCC) 08/28/2023    Unspecified osteoarthritis, unspecified site 08/28/2023    Venous insufficiency (chronic) (peripheral) 08/28/2023    Acute on chronic congestive heart failure (HCC) 06/15/2023    Wound infection 06/15/2023    Venous  stasis ulcer ([facility]) 06/15/2023    Acute on chronic systolic CHF (congestive heart failure), NYHA class 2 ([facility]) 06/14/2023    Shortness of breath 06/14/2023    Back pain 07/25/2022    Non-pressure chronic ulcer of other part of right lower leg with fat layer exposed ([facility]) 06/15/2022    Ulcer of right leg, with fat layer exposed ([facility]) 04/06/2022    Ankle ulcer, right, with fat layer exposed ([facility]) 04/06/2022    Chronic ulcer of right heel limited to breakdown of skin ([facility]) 03/23/2022    Ulcerated, foot, left, with fat layer exposed ([facility]) 03/09/2022    Chronic ulcer of left leg, with fat layer exposed ([facility]) 03/02/2022    Venous ulcer ([facility]) 06/17/2021    Cellulitis and abscess of right leg 06/02/2021    Cellulitis 06/01/2021    COVID-19 01/04/2021    Onychomycosis 11/23/2020    Hyperkeratosis 11/23/2020    Type 2 diabetes mellitus with diabetic polyneuropathy, with long-term current use of insulin ([facility]) 11/23/2020    Localized edema 09/21/2020    Idiopathic chronic venous hypertension of left lower extremity with ulcer ([facility]) 09/14/2020     Past Medical History:   Diagnosis Date    Arthritis     CAD (coronary artery disease)     Chronic obstructive pulmonary disease ([facility])     Diabetes ([facility])     Gout     Hypertension     Ill-defined condition     Sleep apnea     cpap      Past Surgical History:   Procedure Laterality Date    FOOT DEBRIDEMENT Right 12/15/2023    INCISION AND DRAINAGE RIGHT FIRST METATARSAL PHALANGEAL JOINT performed by Polo Oviedo DPM at [facility] MAIN OR    FOOT SURGERY      right heel - foriegn object    HX VEIN ABLATION ADHESIVE      ORTHOPEDIC SURGERY      back surgery    PACEMAKER      aicd -      Family History   Problem Relation Age of Onset    Diabetes Mother     Heart Disease Father     Hypertension Father     Diabetes Sister     Diabetes Brother     Hypertension Mother     Heart Disease Mother     Kidney Disease Mother       Social History     Tobacco Use    Smoking status: Never    Smokeless tobacco:      tamsulosin (FLOMAX) capsule 0.8 mg  0.8 mg Oral Daily Kitty Phelps APRN - CNP   0.8 mg at 08/20/24 0946    vitamin E capsule 400 Units  400 Units Oral BID Kitty Phelps APRN - CNP   400 Units at 08/20/24 0943    zinc sulfate (ZINCATE) 220 mg capsule - elemental zinc 50 mg  50 mg Oral Daily Kitty Phelps APRN - CNP   50 mg at 08/20/24 0946    dexAMETHasone (DECADRON) tablet 6 mg  6 mg Oral Daily Kitty Phelps APRN - CNP   6 mg at 08/20/24 0943    insulin glargine (LANTUS) injection vial 32 Units  32 Units SubCUTAneous Nightly Kitty Phelps APRN - CNP   32 Units at 08/19/24 2102    insulin glargine (LANTUS) injection vial 80 Units  80 Units SubCUTAneous Daily Kitty Phepls APRN - CNP   80 Units at 08/20/24 0957    insulin lispro (HUMALOG,ADMELOG) injection vial 0-16 Units  0-16 Units SubCUTAneous TID WC Kitty Phelps APRN - CNP   8 Units at 08/20/24 1209    insulin lispro (HUMALOG,ADMELOG) injection vial 0-4 Units  0-4 Units SubCUTAneous Nightly Kitty Phelps, APRN - CNP   4 Units at 08/19/24 2102    azelastine (ASTELIN) 0.1 % nasal spray 2 spray  2 spray Each Nostril BID Ed Pino MD          Allergies   Allergen Reactions    Amitriptyline Angioedema    Naproxen      Other reaction(s): Unknown (comments)  Pt not sure of reaction    Sulfa Antibiotics Nausea And Vomiting      Review of Systems   Constitutional:  Negative for activity change, appetite change, chills, fatigue and fever.   HENT:  Negative for ear pain.    Respiratory:  Negative for shortness of breath.    Cardiovascular:  Positive for leg swelling.   Gastrointestinal:  Negative for abdominal pain, diarrhea and vomiting.   Skin:  Positive for wound.   Neurological:  Positive for numbness. Negative for weakness.   Psychiatric/Behavioral:  Negative for behavioral problems. The patient is not nervous/anxious.    Objective:     Patient Vitals for the past 8 hrs:   BP Temp Temp src Pulse Resp SpO2   08/20/24

## 2024-08-20 NOTE — PROGRESS NOTES
IMPRESSION:   Acute on chronic hypoxic respiratory failure  Congestive heart failure with exacerbation  COPD  COVID-19 pneumonia  Pneumonia with Klebsiella  Chronic leg edema  Chronic hypoxic hypercapnic respiratory failure on noninvasive ventilator  Obstructive sleep apnea  Acute on chronic kidney disease stage III  Congestive heart failure  Chronic kidney disease stage IV  Chronic leg edema cellulitis right lower extremity venous stasis  Obstructive sleep apnea with chronic hypercapnic respiratory failure on noninvasive ventilator at home trilogy with settings rate 18  PEEP 5 2 L bleed in  Pt is at high risk of sudden decline and decompensation with life threatening consequenses and continued end organ dysfunction and failure  Pt is critically ill. Time spent with pt and staff actively rendering care, managing pt and coordinating care as stated below; 30 minutes, exclusive of any procedures      RECOMMENDATIONS/PLAN:   68-year-old male came in because of fluid overload short of breath dyspneic on noninvasive ventilator recently treated for COVID-19 pneumonia  Last ABG done on 8/14 shows pCO2 49 pO2 86 on 2 L nasal cannula  He is on trilogy noninvasive ventilator at home rate 18 PEEP 5  FiO2 30%   BIPAP for non invasive ventilatory life support to avoid worsening respiratory acidosis, hypercacarbic or hypoxic respiratory arrest  Intubate and place on vent if NIV fails  Agree with Empiric IV antibiotics pending culture results   Transfuse prn to maintain Hgb > 7  Labs to follow electrolytes, renal function and and blood counts  Bronchial hygiene with respiratory therapy techniques, bronchodilators  Pt needs IV fluids with additives and Drug therapy requiring intensive monitoring for toxicity  Prescription drug management with home med reconciliation reviewed  DVT, SUP prophylaxis     [x] High complexity decision making was performed  [x] See my orders for details  HPI  68-year-old male came in because  ROBINA Diaz CNP        ascorbic acid (VITAMIN C) tablet 1,000 mg  1,000 mg Oral BID LeninKitty APRN - CNP   1,000 mg at 08/20/24 0946    aspirin chewable tablet 81 mg  81 mg Oral Daily LeninKitty APRN - CNP   81 mg at 08/20/24 0941    atorvastatin (LIPITOR) tablet 40 mg  40 mg Oral Nightly Kitty Phelps APRN - CNP   40 mg at 08/19/24 2104    b complex vitamins capsule 1 capsule  1 capsule Oral Daily LeninKitty APRN - CNP   1 capsule at 08/20/24 0944    Benzocaine-Menthol (CEPACOL) 1 lozenge  1 lozenge Oral Q2H PRN LeninKitty APRN - CNP   1 lozenge at 08/20/24 0643    calcium carbonate (TUMS) chewable tablet 500 mg  1 tablet Oral BID LeninKitty APRN - CNP   500 mg at 08/20/24 0941    cetirizine (ZYRTEC) tablet 10 mg  10 mg Oral Nightly LeninKitty APRN - CNP   10 mg at 08/19/24 2104    clonazePAM (KLONOPIN) tablet 0.25 mg  0.25 mg Oral BID PRN LeninKitty APRN - CNP        ferrous sulfate (IRON 325) tablet 325 mg  325 mg Oral Daily with breakfast LeninKitty APRN - CNP   325 mg at 08/20/24 0945    fluconazole (DIFLUCAN) tablet 200 mg  200 mg Oral Daily Kitty Phelps APRN - CNP   200 mg at 08/20/24 0942    fluticasone (FLONASE) 50 MCG/ACT nasal spray 2 spray  2 spray Each Nostril Daily LeninKitty APRN - CNP   2 spray at 08/20/24 0959    budesonide-formoterol (SYMBICORT) 80-4.5 MCG/ACT inhaler 2 puff  2 puff Inhalation BID RT LeninKitty APRN - CNP   2 puff at 08/20/24 0824    And    tiotropium (SPIRIVA RESPIMAT) 2.5 MCG/ACT inhaler 2 puff  2 puff Inhalation Daily RT Kitty Phelps APRN - CNP   2 puff at 08/20/24 0824    guaiFENesin (ROBITUSSIN) 100 MG/5ML liquid 200 mg  200 mg Oral 4x Daily PRN Kitty Phelps APRN - CNP        hydrALAZINE (APRESOLINE) tablet 25 mg  25 mg Oral Q6H PRN Kitty Phelps APRN - CNP        hydrOXYzine HCl (ATARAX) tablet 10 mg  10 mg Oral Q6H PRN Kitty Phelps APRN - CNP

## 2024-08-20 NOTE — PROGRESS NOTES
bicarbonate    Consulted OT 8/19- Frequent repositioning to prevent skin breakdown, Use of BSC for toileting , and Assist x2     Consulted PT 8/19- Out of bed to chair for meals, Encourage HEP in prep for ADLs/mobility, AD and gt belt for bed to chair , Amb to bathroom with AD and gait belt, and Assist x1       Past Medical History:   Diagnosis Date    Arthritis     CAD (coronary artery disease)     Chronic obstructive pulmonary disease (HCC)     Diabetes (HCC)     Gout     Hypertension     Ill-defined condition     Sleep apnea     cpap        Past Surgical History:   Procedure Laterality Date    FOOT DEBRIDEMENT Right 12/15/2023    INCISION AND DRAINAGE RIGHT FIRST METATARSAL PHALANGEAL JOINT performed by Polo Oviedo DPM at [facility] MAIN OR    FOOT SURGERY      right heel - foriegn object    HX VEIN ABLATION ADHESIVE      ORTHOPEDIC SURGERY      back surgery    PACEMAKER      aicd -       Social History     Tobacco Use    Smoking status: Never    Smokeless tobacco: Never   Substance Use Topics    Alcohol use: Not Currently        Family History   Problem Relation Age of Onset    Diabetes Mother     Heart Disease Father     Hypertension Father     Diabetes Sister     Diabetes Brother     Hypertension Mother     Heart Disease Mother     Kidney Disease Mother        Allergies   Allergen Reactions    Amitriptyline Angioedema    Naproxen      Other reaction(s): Unknown (comments)  Pt not sure of reaction    Sulfa Antibiotics Nausea And Vomiting        Prior to Admission medications    Medication Sig Start Date End Date Taking? Authorizing Provider   azelastine (ASTELIN) 0.1 % nasal spray 2 sprays by Nasal route 2 times daily Use in each nostril as directed   Yes Doretha Mccarthy MD   benzocaine-menthol (CEPACOL SORE THROAT) 15-3.6 MG lozenge Take 1 lozenge by mouth every 2 hours as needed for Sore Throat   Yes Doretha Mccarthy MD   calcium carbonate (TUMS) 500 MG chewable tablet Take 1 tablet by mouth 2 times  nightly 7/30/24  Yes Ed Pino MD   ferrous sulfate (IRON 325) 325 (65 Fe) MG tablet Take 1 tablet by mouth daily (with breakfast)   Yes Doretha Mccarthy MD   potassium chloride (KLOR-CON) 20 MEQ packet Take 40 mEq by mouth 2 times daily   Yes Doretha Mccarthy MD   pantoprazole (PROTONIX) 40 MG tablet Take 1 tablet by mouth every morning (before breakfast) 4/3/24  Yes Ed Pino MD   oxyCODONE (ROXICODONE) 5 MG immediate release tablet Take 2 tablets by mouth 4 times daily.   Yes Doretha Mccarthy MD   gabapentin (NEURONTIN) 400 MG capsule Take 1 capsule by mouth 4 times daily.   Yes Doretha Mccarthy MD   allopurinol (ZYLOPRIM) 100 MG tablet Take 2 tablets by mouth daily 11/7/23 8/15/24 Yes Sergei Kolb MD   bumetanide (BUMEX) 2 MG tablet Take 1 tablet by mouth 2 times daily 8/11/23  Yes Doretha Mccarthy MD   albuterol sulfate HFA (PROVENTIL;VENTOLIN;PROAIR) 108 (90 Base) MCG/ACT inhaler Inhale 1 puff into the lungs every 4 hours as needed 9/14/22  Yes Doretha Mccarthy MD   ascorbic acid (VITAMIN C) 500 MG tablet Take 2 tablets by mouth 2 times daily   Yes Doretha Mccarthy MD   aspirin 81 MG chewable tablet Take 1 tablet by mouth daily   Yes Doretha Mccarthy MD   atorvastatin (LIPITOR) 40 MG tablet Take 1 tablet by mouth nightly at bedtime. 4/24/23  Yes Doretha Mccarthy MD   B Complex Vitamins (VITAMIN B COMPLEX) TABS Take 1 tablet by mouth daily   Yes Doretha Mccarthy MD   flunisolide (NASALIDE) 25 MCG/ACT (0.025%) SOLN 2 sprays 2 times daily 9/9/22  Yes Doretha Mccarthy MD   fluticasone-umeclidin-vilant (TRELEGY ELLIPTA) 100-62.5-25 MCG/ACT AEPB inhaler INHALE 1 INHALATION BY MOUTH ONCE DAILY 1/4/22  Yes Doretha Mccarthy MD   vitamin E 400 UNIT capsule Take 1 capsule by mouth 2 times daily 6/8/21  Yes Doretha Mccarthy MD         Current Facility-Administered Medications:     [START ON 8/21/2024] lactobacillus (CULTURELLE)  edema.         Electronically signed by Madeline Barrett      XR CHEST PORTABLE   Final Result   Cardiomegaly and mild pulmonary edema.      Electronically signed by Nasir Sebastian           Review of Systems:  Constitutional: Negative for chills and fever.   HENT: Negative.    Eyes: Negative.    Respiratory: Negative.    Cardiovascular: Negative.    Gastrointestinal: Negative for abdominal pain and nausea.   Skin: Negative.    Neurological: Negative.      Objective:   VITALS:    Vitals:    08/20/24 1153   BP: 118/83   Pulse: 70   Resp: 18   Temp: 97.7 °F (36.5 °C)   SpO2: 99%       Physical Exam:   Constitutional: pt is oriented to person, place, and time.   HENT:   Head: Normocephalic and atraumatic.   Eyes: Pupils are equal, round, and reactive to light. EOM are normal.   Cardiovascular: Normal rate, regular rhythm and normal heart sounds.   Pulmonary/Chest: Breath sounds normal. No wheezes. No rales.   Exhibits no tenderness.   Abdominal: Soft. Bowel sounds are normal. There is no abdominal tenderness. There is no rebound and no guarding.   Musculoskeletal: Normal range of motion.   Neurological: pt is alert and oriented to person, place, and time. Alert. Normal strength. No cranial nerve deficit or sensory deficit. Displays a negative Romberg sign.        ASSESSMENT & PLAN number melatonin    COVID-19  Volume/fluid overload  Diabetes  COPD  Anemia  Hyperkalemia  Acute CHF  Respiratory acidosis with metabolic alkalosis  Acute kidney injury on chronic kidney disease stage III  Sleep apnea  Hypertension  Arthritis  Chronic stasis ulcers bilaterally legs  Hyperkalemia  Pneumonia with Klebsiella pneumonia    Vitamin C 1000 mg oral twice daily  Aspirin 81 Mg oral daily  Lipitor 40 Mg oral nightly  Astelin 0.1% nasal spray 2 spray each nostril 2 times daily  Azithromycin 250 mg oral Daily  B complex vitamin capsule oral daily  Symbicort 80-4.5 inhalation 2 puff  Spiriva Respimat 2.5 mcg/ACT inhaler 2 puff daily  Tums 500 mg

## 2024-08-20 NOTE — CONSULTS
IMPRESSION:   Acute on chronic hypoxic respiratory failure  Congestive heart failure with exacerbation  COPD  COVID-19 pneumonia  Pneumonia with Klebsiella  Chronic leg edema  Chronic hypoxic hypercapnic respiratory failure on noninvasive ventilator  Obstructive sleep apnea  Acute on chronic kidney disease stage III  Congestive heart failure  Chronic kidney disease stage IV  Chronic leg edema cellulitis right lower extremity venous stasis  Obstructive sleep apnea with chronic hypercapnic respiratory failure on noninvasive ventilator at home trilogy with settings rate 18  PEEP 5 2 L bleed in  Pt is at high risk of sudden decline and decompensation with life threatening consequenses and continued end organ dysfunction and failure  Pt is critically ill. Time spent with pt and staff actively rendering care, managing pt and coordinating care as stated below; 55 minutes, exclusive of any procedures      RECOMMENDATIONS/PLAN:   68-year-old male came in because of fluid overload short of breath dyspneic on noninvasive ventilator recently treated for COVID-19 pneumonia  He is on trilogy noninvasive ventilator at home rate 18 PEEP 5  FiO2 30%   BIPAP for non invasive ventilatory life support to avoid worsening respiratory acidosis, hypercacarbic or hypoxic respiratory arrest  Intubate and place on vent if NIV fails  Agree with Empiric IV antibiotics pending culture results   Follow culture results  CVP monitoring  IV vasopressors for circulatory shock refractory to fluids to maintain SBP> 90  High flow nasal Cannula oxygen as salvage oxygen delivery device to provide high concentration of oxygen to overcome refractory hypoxia;   Transfuse prn to maintain Hgb > 7  Labs to follow electrolytes, renal function and and blood counts  Bronchial hygiene with respiratory therapy techniques, bronchodilators  Pt needs IV fluids with additives and Drug therapy requiring intensive monitoring for toxicity  Prescription drug  the patient's condition was unstable, unpredictable and critically ill in the CCU/ ICU. I was immediately available to the patient whose care required several interactions with nursing, multidisciplinary team members leading to multiple interventions with fluid resuscitation and medication adjustments to optimize respiratory support, hemodynamic treatment, medication changes based on repeat labs results, reviews, exams and assessments. The reason for providing this level of medical care was due to a critical illness that impaired one or more vital organ systems, such that there was a high probability of sudden or life threatening deterioration in the patient's condition.

## 2024-08-20 NOTE — PROGRESS NOTES
Infectious Disease Progress Note           Subjective:   Remains stable, denies new complaints, reports feeling much better, no acute events since last seen.   Objective:   Physical Exam:     BP 97/78   Pulse 64   Temp 98.1 °F (36.7 °C) (Oral)   Resp 18   Ht 1.803 m (5' 11\")   Wt (!) 165 kg (363 lb 12.1 oz)   SpO2 99%   BMI 50.73 kg/m²  O2 Flow Rate (L/min): 3 L/min O2 Device: Nasal cannula    Temp (24hrs), Av.7 °F (36.5 °C), Min:97.3 °F (36.3 °C), Max:98.1 °F (36.7 °C)     07 -  1900  In: 1100 [P.O.:1100]  Out: 400 [Urine:400]   1901 -  0700  In: 1802 [P.O.:1802]  Out: 3900 [Urine:3900]    General: NAD, AAO x 4  HEENT: REBEKAH, Moist mucosa   Lungs: CTA b/l, decreased at the bases, no wheeze/rhonchi   Heart: S1S2+, RRR, no murmur  Abdo: Soft, NT, ND, +BS   : no chronic juares cath   Exts: decreased b/l leg swelling, compression dressing in place   Skin: b/l leg dressings in place, not examined     Data Review:       Recent Days:    Recent Labs     24  0032 24  1039   WBC 10.2 10.1   HGB 8.9* 9.9*   HCT 28.9* 32.1*    235     Recent Labs     24  0648 24  0032   BUN 96* 91*   CREATININE 3.39* 3.21*       Lab Results   Component Value Date/Time    CRP 3.22 2024 10:39 AM          Microbiology     Results       Procedure Component Value Units Date/Time    Culture, Respiratory [[id number]]  (Abnormal)  (Susceptibility) Collected: 24 1400    Order Status: Completed Specimen: Sputum Updated: 24 1342     Special Requests No Special Requests        Gram Stain       Rare Epithelial cells seen            1+ WBCs seen         3+ Gram Negative Rods               Occasional Gram Positive Cocci in pairs           Culture       Heavy Klebsiella pneumoniae                  Moderate Enterobacter cloacae complex                  No normal respiratory ady isolated          Susceptibility        Klebsiella pneumoniae      BACTERIAL  SUSCEPTIBILITY PANEL POWER      amikacin <=2 ug/mL Sensitive      ampicillin 16 ug/mL Resistant      ampicillin-sulbactam 4 ug/mL Sensitive      ceFAZolin <=4 ug/mL Sensitive      cefepime <=1 ug/mL Sensitive      cefOXitin <=4 ug/mL Sensitive      cefTAZidime <=1 ug/mL Sensitive      cefTRIAXone <=1 ug/mL Sensitive      ciprofloxacin <=0.25 ug/mL Sensitive      gentamicin <=1 ug/mL Sensitive      levofloxacin <=0.12 ug/mL Sensitive      meropenem <=0.25 ug/mL Sensitive      piperacillin-tazobactam <=4 ug/mL Sensitive      tobramycin <=1 ug/mL Sensitive      trimethoprim-sulfamethoxazole <=20 ug/mL Sensitive                       Susceptibility        Enterobacter cloacae complex      BACTERIAL SUSCEPTIBILITY PANEL POWER      amikacin <=2 ug/mL Sensitive      ceFAZolin >=64 ug/mL Resistant      cefepime <=1 ug/mL Sensitive      cefOXitin >=64 ug/mL Resistant      cefTAZidime <=1 ug/mL Sensitive      cefTRIAXone <=1 ug/mL Sensitive      ciprofloxacin <=0.25 ug/mL Sensitive      gentamicin <=1 ug/mL Sensitive      levofloxacin <=0.12 ug/mL Sensitive      tobramycin <=1 ug/mL Sensitive      trimethoprim-sulfamethoxazole <=20 ug/mL Sensitive                           COVID-19, Rapid [[id number]]  (Abnormal) Collected: 08/15/24 1523    Order Status: Completed Specimen: Nasopharyngeal Updated: 08/15/24 1700     Source Swab        SARS-CoV-2, PCR DETECTED        Comment:   Positive results are indicative of the presence of SARS-CoV-2. Clinical correlation with patient history and other diagnostic information is necessary to determine patient infection status. Positive results do not rule out bacterial infection or co-infection with other pathogens.      Testing was performed using osito Della SARS-CoV-2 assay. This test is a multiplex Real-Time Reverse Transcriptase Polymerase Chain Reaction (RT-PCR) based in vitro diagnostic test intended for the qualitative detection of nucleic acids from SARS-CoV-2 in nasopharyngeal and

## 2024-08-20 NOTE — PLAN OF CARE
Problem: Discharge Planning  Goal: Discharge to home or other facility with appropriate resources  Outcome: Progressing     Problem: Skin/Tissue Integrity  Goal: Absence of new skin breakdown  Description: 1.  Monitor for areas of redness and/or skin breakdown  2.  Assess vascular access sites hourly  3.  Every 4-6 hours minimum:  Change oxygen saturation probe site  4.  Every 4-6 hours:  If on nasal continuous positive airway pressure, respiratory therapy assess nares and determine need for appliance change or resting period.  Outcome: Progressing     Problem: ABCDS Injury Assessment  Goal: Absence of physical injury  Outcome: Progressing     Problem: Safety - Adult  Goal: Free from fall injury  8/20/2024 0704 by Alhaji Jay, RN  Outcome: Progressing  8/19/2024 2309 by Massiel Seymour, RN  Outcome: Progressing     Problem: Respiratory - Adult  Goal: Achieves optimal ventilation and oxygenation  Outcome: Progressing     Problem: Cardiovascular - Adult  Goal: Maintains optimal cardiac output and hemodynamic stability  Outcome: Progressing  Goal: Absence of cardiac dysrhythmias or at baseline  Outcome: Progressing     Problem: Skin/Tissue Integrity - Adult  Goal: Incisions, wounds, or drain sites healing without S/S of infection  Outcome: Progressing     Problem: Infection - Adult  Goal: Absence of infection at discharge  Outcome: Progressing  Goal: Absence of infection during hospitalization  Outcome: Progressing  Goal: Absence of fever/infection during anticipated neutropenic period  Outcome: Progressing     Problem: Metabolic/Fluid and Electrolytes - Adult  Goal: Electrolytes maintained within normal limits  Outcome: Progressing  Goal: Hemodynamic stability and optimal renal function maintained  Outcome: Progressing     Problem: Pain  Goal: Verbalizes/displays adequate comfort level or baseline comfort level  Outcome: Progressing     Problem: Chronic Conditions and Co-morbidities  Goal: Patient's chronic

## 2024-08-20 NOTE — CONSULTS
[facility] NEUROLOGY CONSULTATION          Chief Complaint/Admission Diagnosis: DEMARCO (acute kidney injury) ([facility]) [N17.9]  Fluid overload [E87.70]  Volume overload [E87.70]  Hypervolemia, unspecified hypervolemia type [E87.70]  COVID-19 [U07.1]     I have been asked to see this 68 y.o. male in neurological consultation by Ed Marsh MD  to render advice and opinion regarding tremors in the arms and legs.    ?     Impression/Recommendations:      Patient is a 68 y.o. year old male with past medical history of arthritis, coronary artery disease, COPD, diabetes (A1c 9.3) gout, hypertension, sleep apnea presents to the ED with dyspnea. Neurology is consulted for tremor. Initially it was thought that it would be due to abnormal metabolic deragements. Fixing those helped 25%. CT head is negative for any acute change.  Patient has essential tremor Right worse than left. He is on TOO many medication at the moment. However since the ET is causing functional impairment, he is okay with starting a new medication. Will start him on propranolol 40 mg bid. Can try primidone in future.     Outpatient neurology appointment for follow up.       Yue Botello MD  Teleneurologist    HPI:   History was obtained from a review of the electronic record and from the patient and family.??   Patient is a 68 y.o. year old male with past medical history of arthritis, coronary artery disease, COPD, diabetes, gout, hypertension, sleep apnea presents to the ED with dyspnea. Patient reports he was diagnosed with COVID 1 week prior, and has been increasingly short of breath since then. Also states he was taken off of his diuretic and has had increasing swelling in the legs and abdomen. He has a productive cough, states he feels like he cannot \"get it out \". No nausea or vomiting. No fevers at home.      8/16  HPI-patient alert and in moderate distress.  He endorses shortness of breath, coughing, nausea without vomiting.  He

## 2024-08-20 NOTE — PROGRESS NOTES
OCCUPATIONAL THERAPY TREATMENT  Patient: Shantanu Casillas (68 y.o. male)  Date: 8/20/2024  Primary Diagnosis: DEMARCO (acute kidney injury) (HCC) [N17.9]  Fluid overload [E87.70]  Volume overload [E87.70]  Hypervolemia, unspecified hypervolemia type [E87.70]  COVID-19 [U07.1]       Precautions: Fall Risk, Isolation (Airborne, droplet precautions)                Recommendations for nursing mobility: Out of bed to chair for meals, Encourage HEP in prep for ADLs/mobility; see handout for details, Use of BSC for toileting , and Assist x1    In place during session: Nasal Cannula 2L, EKG/telemetry , and Pulse ox  Chart, occupational therapy assessment, plan of care, and goals were reviewed.  ASSESSMENT  Patient continues with skilled OT services and is progressing towards goals. Pt seated eob upon EDEN arrival, agreeable to session. Pt A&O x 4. Pt reported that he had a bm and needed assistance for cleaning. Pt able to complete sts w/ cga and stood for ~ 5 minutes x2 to aid in cleaning.  Pt TA for posterior kaylah care and applying z-guard.  Pt completed UE therex, see grid below for details, to maintain/ increase strength and endurance to adl in adl performance. Education provided on energy conservation and HEP w/ pt verbalizing good understanding.  Pt left w/ RN and all known needs met.   (See below for objective details and assist levels).     Overall pt tolerated session well today with rest breaks. Pt can tolerated 3 hours of therapy per day , 5 days per week . Potential barriers for safe discharge: pts current support system is unable to meet their requirements for physical assistance, pt is a high fall risk, pt is not safe to be alone, and concern for pt safely navigating or managing the home environment. Current OT recommendations for discharge [facility] . Will continue to benefit from skilled OT services, and will continue to progress as tolerated.       GOALS:    Problem: Occupational Therapy  Verbalized understanding;Demonstrated understanding;Continued education needed    Thank you for this referral.  ODALIS White  Minutes: 36

## 2024-08-20 NOTE — CARE COORDINATION
CM spoke with patient regarding DCP.    Patient recc for IRF, patient has NO SNF days left at this time.    Patient stated he did not really enjoy [facility] but would likely have to return as he is not in a position to return home at this time.    CM attempted to call patient's wife, Migdalia [phone], however no answer at this time, VM left requesting return call.    [facility] CAN accept patient while COVID positive.    CM continues to follow and monitor for needs.

## 2024-08-21 LAB
ALBUMIN SERPL-MCNC: 2.6 G/DL (ref 3.5–5)
ALBUMIN/GLOB SERPL: 0.6 (ref 1.1–2.2)
ALP SERPL-CCNC: 76 U/L (ref 45–117)
ALT SERPL-CCNC: 26 U/L (ref 12–78)
ANION GAP SERPL CALC-SCNC: 9 MMOL/L (ref 5–15)
AST SERPL W P-5'-P-CCNC: 24 U/L (ref 15–37)
BILIRUB SERPL-MCNC: 0.3 MG/DL (ref 0.2–1)
BUN SERPL-MCNC: 97 MG/DL (ref 6–20)
BUN/CREAT SERPL: 38 (ref 12–20)
CA-I BLD-MCNC: 8.8 MG/DL (ref 8.5–10.1)
CHLORIDE SERPL-SCNC: 107 MMOL/L (ref 97–108)
CO2 SERPL-SCNC: 27 MMOL/L (ref 21–32)
CREAT SERPL-MCNC: 2.58 MG/DL (ref 0.7–1.3)
ERYTHROCYTE [DISTWIDTH] IN BLOOD BY AUTOMATED COUNT: 20.6 % (ref 11.5–14.5)
GLOBULIN SER CALC-MCNC: 4.2 G/DL (ref 2–4)
GLUCOSE BLD STRIP.AUTO-MCNC: 161 MG/DL (ref 65–100)
GLUCOSE BLD STRIP.AUTO-MCNC: 198 MG/DL (ref 65–100)
GLUCOSE BLD STRIP.AUTO-MCNC: 225 MG/DL (ref 65–100)
GLUCOSE BLD STRIP.AUTO-MCNC: 253 MG/DL (ref 65–100)
GLUCOSE BLD STRIP.AUTO-MCNC: 332 MG/DL (ref 65–100)
GLUCOSE SERPL-MCNC: 138 MG/DL (ref 65–100)
HCT VFR BLD AUTO: 30.9 % (ref 36.6–50.3)
HGB BLD-MCNC: 9.7 G/DL (ref 12.1–17)
MCH RBC QN AUTO: 29.8 PG (ref 26–34)
MCHC RBC AUTO-ENTMCNC: 31.4 G/DL (ref 30–36.5)
MCV RBC AUTO: 94.8 FL (ref 80–99)
NRBC # BLD: 0.16 K/UL (ref 0–0.01)
NRBC BLD-RTO: 1.6 PER 100 WBC
PERFORMED BY:: ABNORMAL
PHOSPHATE SERPL-MCNC: 4.4 MG/DL (ref 2.6–4.7)
PLATELET # BLD AUTO: 196 K/UL (ref 150–400)
PMV BLD AUTO: 11.2 FL (ref 8.9–12.9)
POTASSIUM SERPL-SCNC: 4.5 MMOL/L (ref 3.5–5.1)
PROT SERPL-MCNC: 6.8 G/DL (ref 6.4–8.2)
RBC # BLD AUTO: 3.26 M/UL (ref 4.1–5.7)
SARS-COV-2 RNA RESP QL NAA+PROBE: DETECTED
SODIUM SERPL-SCNC: 143 MMOL/L (ref 136–145)
SPECIMEN SOURCE: ABNORMAL
URATE SERPL-MCNC: 10.2 MG/DL (ref 3.5–7.2)
WBC # BLD AUTO: 10.2 K/UL (ref 4.1–11.1)

## 2024-08-21 PROCEDURE — 5A09357 ASSISTANCE WITH RESPIRATORY VENTILATION, LESS THAN 24 CONSECUTIVE HOURS, CONTINUOUS POSITIVE AIRWAY PRESSURE: ICD-10-PCS | Performed by: INTERNAL MEDICINE

## 2024-08-21 PROCEDURE — 6370000000 HC RX 637 (ALT 250 FOR IP): Performed by: INTERNAL MEDICINE

## 2024-08-21 PROCEDURE — 94761 N-INVAS EAR/PLS OXIMETRY MLT: CPT

## 2024-08-21 PROCEDURE — 6370000000 HC RX 637 (ALT 250 FOR IP): Performed by: NURSE PRACTITIONER

## 2024-08-21 PROCEDURE — 82962 GLUCOSE BLOOD TEST: CPT

## 2024-08-21 PROCEDURE — 2500000003 HC RX 250 WO HCPCS: Performed by: FAMILY MEDICINE

## 2024-08-21 PROCEDURE — 6360000002 HC RX W HCPCS: Performed by: INTERNAL MEDICINE

## 2024-08-21 PROCEDURE — 84100 ASSAY OF PHOSPHORUS: CPT

## 2024-08-21 PROCEDURE — 97530 THERAPEUTIC ACTIVITIES: CPT

## 2024-08-21 PROCEDURE — 1100000000 HC RM PRIVATE

## 2024-08-21 PROCEDURE — 36415 COLL VENOUS BLD VENIPUNCTURE: CPT

## 2024-08-21 PROCEDURE — 94640 AIRWAY INHALATION TREATMENT: CPT

## 2024-08-21 PROCEDURE — 6370000000 HC RX 637 (ALT 250 FOR IP): Performed by: PSYCHIATRY & NEUROLOGY

## 2024-08-21 PROCEDURE — 97110 THERAPEUTIC EXERCISES: CPT

## 2024-08-21 PROCEDURE — 87635 SARS-COV-2 COVID-19 AMP PRB: CPT

## 2024-08-21 PROCEDURE — 2580000003 HC RX 258: Performed by: NURSE PRACTITIONER

## 2024-08-21 PROCEDURE — 6370000000 HC RX 637 (ALT 250 FOR IP): Performed by: FAMILY MEDICINE

## 2024-08-21 PROCEDURE — 84550 ASSAY OF BLOOD/URIC ACID: CPT

## 2024-08-21 PROCEDURE — 85027 COMPLETE CBC AUTOMATED: CPT

## 2024-08-21 PROCEDURE — 2700000000 HC OXYGEN THERAPY PER DAY

## 2024-08-21 PROCEDURE — 80053 COMPREHEN METABOLIC PANEL: CPT

## 2024-08-21 PROCEDURE — 6360000002 HC RX W HCPCS: Performed by: NURSE PRACTITIONER

## 2024-08-21 PROCEDURE — 2500000003 HC RX 250 WO HCPCS: Performed by: NURSE PRACTITIONER

## 2024-08-21 PROCEDURE — 99232 SBSQ HOSP IP/OBS MODERATE 35: CPT | Performed by: INTERNAL MEDICINE

## 2024-08-21 PROCEDURE — 2580000003 HC RX 258: Performed by: INTERNAL MEDICINE

## 2024-08-21 RX ORDER — LOPERAMIDE HCL 2 MG
2 CAPSULE ORAL 4 TIMES DAILY PRN
Status: DISCONTINUED | OUTPATIENT
Start: 2024-08-21 | End: 2024-08-24 | Stop reason: HOSPADM

## 2024-08-21 RX ADMIN — DEXAMETHASONE 6 MG: 4 TABLET ORAL at 10:24

## 2024-08-21 RX ADMIN — ISOSORBIDE DINITRATE 20 MG: 5 TABLET ORAL at 10:36

## 2024-08-21 RX ADMIN — OXYCODONE HYDROCHLORIDE 10 MG: 10 TABLET ORAL at 21:53

## 2024-08-21 RX ADMIN — Medication 2 PUFF: at 20:50

## 2024-08-21 RX ADMIN — OXYCODONE HYDROCHLORIDE 10 MG: 10 TABLET ORAL at 15:03

## 2024-08-21 RX ADMIN — Medication 2 PUFF: at 09:53

## 2024-08-21 RX ADMIN — CETIRIZINE HYDROCHLORIDE 10 MG: 10 TABLET, FILM COATED ORAL at 21:50

## 2024-08-21 RX ADMIN — Medication 1 CAPSULE: at 10:27

## 2024-08-21 RX ADMIN — Medication 1 MG: at 10:25

## 2024-08-21 RX ADMIN — OXYCODONE HYDROCHLORIDE 10 MG: 10 TABLET ORAL at 06:33

## 2024-08-21 RX ADMIN — Medication 1 LOZENGE: at 17:32

## 2024-08-21 RX ADMIN — GABAPENTIN 400 MG: 400 CAPSULE ORAL at 21:50

## 2024-08-21 RX ADMIN — Medication 1 LOZENGE: at 21:47

## 2024-08-21 RX ADMIN — INSULIN LISPRO 4 UNITS: 100 INJECTION, SOLUTION INTRAVENOUS; SUBCUTANEOUS at 21:48

## 2024-08-21 RX ADMIN — WHITE PETROLATUM,ZINC OXIDE: 57; 17 PASTE TOPICAL at 10:27

## 2024-08-21 RX ADMIN — PROPRANOLOL HYDROCHLORIDE 40 MG: 40 TABLET ORAL at 10:36

## 2024-08-21 RX ADMIN — FERROUS SULFATE TAB 325 MG (65 MG ELEMENTAL FE) 325 MG: 325 (65 FE) TAB at 10:36

## 2024-08-21 RX ADMIN — GABAPENTIN 400 MG: 400 CAPSULE ORAL at 10:25

## 2024-08-21 RX ADMIN — FLUCONAZOLE 200 MG: 100 TABLET ORAL at 10:26

## 2024-08-21 RX ADMIN — SODIUM CHLORIDE, PRESERVATIVE FREE 10 ML: 5 INJECTION INTRAVENOUS at 22:13

## 2024-08-21 RX ADMIN — Medication 3 MG: at 21:50

## 2024-08-21 RX ADMIN — ISOSORBIDE DINITRATE 20 MG: 5 TABLET ORAL at 17:32

## 2024-08-21 RX ADMIN — ZINC SULFATE 220 MG (50 MG) CAPSULE 50 MG: CAPSULE at 10:24

## 2024-08-21 RX ADMIN — TAMSULOSIN HYDROCHLORIDE 0.8 MG: 0.4 CAPSULE ORAL at 10:24

## 2024-08-21 RX ADMIN — CLOTRIMAZOLE 10 MG: 10 LOZENGE ORAL at 10:26

## 2024-08-21 RX ADMIN — OXYCODONE HYDROCHLORIDE AND ACETAMINOPHEN 1000 MG: 500 TABLET ORAL at 21:50

## 2024-08-21 RX ADMIN — ASPIRIN 81 MG CHEWABLE TABLET 81 MG: 81 TABLET CHEWABLE at 10:25

## 2024-08-21 RX ADMIN — HYDRALAZINE HYDROCHLORIDE 10 MG: 10 TABLET ORAL at 14:00

## 2024-08-21 RX ADMIN — Medication 400 UNITS: at 10:25

## 2024-08-21 RX ADMIN — INSULIN LISPRO 8 UNITS: 100 INJECTION, SOLUTION INTRAVENOUS; SUBCUTANEOUS at 17:32

## 2024-08-21 RX ADMIN — Medication 2 PUFF: at 09:54

## 2024-08-21 RX ADMIN — LOPERAMIDE HYDROCHLORIDE 2 MG: 2 CAPSULE ORAL at 17:32

## 2024-08-21 RX ADMIN — FLUTICASONE PROPIONATE 2 SPRAY: 50 SPRAY, METERED NASAL at 10:27

## 2024-08-21 RX ADMIN — ACETAMINOPHEN 650 MG: 325 TABLET ORAL at 02:57

## 2024-08-21 RX ADMIN — ATORVASTATIN CALCIUM 40 MG: 40 TABLET, FILM COATED ORAL at 21:50

## 2024-08-21 RX ADMIN — GABAPENTIN 400 MG: 400 CAPSULE ORAL at 14:00

## 2024-08-21 RX ADMIN — GUAIFENESIN 200 MG: 200 SOLUTION ORAL at 21:44

## 2024-08-21 RX ADMIN — PANTOPRAZOLE SODIUM 40 MG: 40 TABLET, DELAYED RELEASE ORAL at 06:33

## 2024-08-21 RX ADMIN — CALCIUM CARBONATE 500 MG: 500 TABLET, CHEWABLE ORAL at 10:25

## 2024-08-21 RX ADMIN — HYDRALAZINE HYDROCHLORIDE 10 MG: 10 TABLET ORAL at 21:49

## 2024-08-21 RX ADMIN — INSULIN GLARGINE 80 UNITS: 100 INJECTION, SOLUTION SUBCUTANEOUS at 10:23

## 2024-08-21 RX ADMIN — INSULIN GLARGINE 32 UNITS: 100 INJECTION, SOLUTION SUBCUTANEOUS at 21:48

## 2024-08-21 RX ADMIN — HYDRALAZINE HYDROCHLORIDE 10 MG: 10 TABLET ORAL at 06:33

## 2024-08-21 RX ADMIN — Medication 1 CAPSULE: at 10:26

## 2024-08-21 RX ADMIN — Medication 1 LOZENGE: at 00:59

## 2024-08-21 RX ADMIN — WHITE PETROLATUM,ZINC OXIDE: 57; 17 PASTE TOPICAL at 22:13

## 2024-08-21 RX ADMIN — CLOTRIMAZOLE 10 MG: 10 LOZENGE ORAL at 21:50

## 2024-08-21 RX ADMIN — OXYCODONE HYDROCHLORIDE AND ACETAMINOPHEN 1000 MG: 500 TABLET ORAL at 10:25

## 2024-08-21 RX ADMIN — CEFTRIAXONE SODIUM 2000 MG: 2 INJECTION, POWDER, FOR SOLUTION INTRAMUSCULAR; INTRAVENOUS at 17:32

## 2024-08-21 RX ADMIN — ISOSORBIDE DINITRATE 20 MG: 5 TABLET ORAL at 14:00

## 2024-08-21 RX ADMIN — CALCIUM CARBONATE 500 MG: 500 TABLET, CHEWABLE ORAL at 21:50

## 2024-08-21 RX ADMIN — CLOTRIMAZOLE 10 MG: 10 LOZENGE ORAL at 14:00

## 2024-08-21 RX ADMIN — Medication 400 UNITS: at 21:50

## 2024-08-21 RX ADMIN — SODIUM CHLORIDE, PRESERVATIVE FREE 10 ML: 5 INJECTION INTRAVENOUS at 10:28

## 2024-08-21 RX ADMIN — Medication 400 MG: at 10:26

## 2024-08-21 ASSESSMENT — PAIN SCALES - GENERAL
PAINLEVEL_OUTOF10: 9
PAINLEVEL_OUTOF10: 6
PAINLEVEL_OUTOF10: 9
PAINLEVEL_OUTOF10: 8
PAINLEVEL_OUTOF10: 8
PAINLEVEL_OUTOF10: 9

## 2024-08-21 ASSESSMENT — PAIN DESCRIPTION - ORIENTATION: ORIENTATION: RIGHT;LEFT

## 2024-08-21 ASSESSMENT — PAIN DESCRIPTION - LOCATION
LOCATION: LEG
LOCATION: FOOT

## 2024-08-21 NOTE — PROGRESS NOTES
Nephrology follow up          Patient: Shantanu Casillas MRN: [id number]  SSN: [id number]    YOB: 1955  Age: 68 y.o.  Sex: male      Subjective:   The pt is seen in the room.  Blood pressures are good  Afebrile  On nasal cannula 3 L  Resolving lower extremity swelling  resolving DEMARCO  Past Medical History:   Diagnosis Date    Arthritis     CAD (coronary artery disease)     Chronic obstructive pulmonary disease (HCC)     Diabetes (HCC)     Gout     Hypertension     Ill-defined condition     Sleep apnea     cpap     Past Surgical History:   Procedure Laterality Date    FOOT DEBRIDEMENT Right 12/15/2023    INCISION AND DRAINAGE RIGHT FIRST METATARSAL PHALANGEAL JOINT performed by Polo Oviedo DPM at [facility] MAIN OR    FOOT SURGERY      right heel - foriegn object    HX VEIN ABLATION ADHESIVE      ORTHOPEDIC SURGERY      back surgery    PACEMAKER      aicd -      Family History   Problem Relation Age of Onset    Diabetes Mother     Heart Disease Father     Hypertension Father     Diabetes Sister     Diabetes Brother     Hypertension Mother     Heart Disease Mother     Kidney Disease Mother      Social History     Tobacco Use    Smoking status: Never    Smokeless tobacco: Never   Substance Use Topics    Alcohol use: Not Currently      Current Facility-Administered Medications   Medication Dose Route Frequency Provider Last Rate Last Admin    loperamide (IMODIUM) capsule 2 mg  2 mg Oral 4x Daily PRN Sergei Kolb MD        lactobacillus (CULTURELLE) capsule 1 capsule  1 capsule Oral Daily with breakfast Ed Pino MD   1 capsule at 08/21/24 1026    propranolol (INDERAL) tablet 40 mg  40 mg Oral BID Yue Botello MD   40 mg at 08/21/24 1036    clotrimazole (MYCELEX) matthias 10 mg  10 mg Oral TID Ed Pino MD   10 mg at 08/21/24 1400    cefTRIAXone (ROCEPHIN) 2,000 mg in sterile water 20 mL IV syringe  2,000 mg IntraVENous Q24H Sergei Kolb MD   2,000 mg at 08/20/24      hydrALAZINE (APRESOLINE) tablet 25 mg  25 mg Oral Q6H PRN Kitty Phelps , APRN - FADIA        hydrOXYzine HCl (ATARAX) tablet 10 mg  10 mg Oral Q6H PRN Kitty Phelps , ROBINA - FADIA        ipratropium 0.5 mg-albuterol 2.5 mg (DUONEB) nebulizer solution 1 Dose  1 Dose Inhalation Q4H PRN Kitty Phelps APRN - CNP        lidocaine 4 % external patch 1 patch  1 patch TransDERmal Daily Kitty Phelps APRN - CNP   1 patch at 08/21/24 1028    magnesium oxide (MAG-OX) tablet 400 mg  400 mg Oral Daily Kitty Phelps ROBINA - CNP   400 mg at 08/21/24 1026    melatonin tablet 3 mg  3 mg Oral Nightly Kitty Phelps APRN - CNP   3 mg at 08/20/24 2150    Virt-Caps 1 mg  1 capsule Oral [facility] Kitty Phelps ROBINA - CNP   1 mg at 08/21/24 1025    pantoprazole (PROTONIX) tablet 40 mg  40 mg Oral QAM AC Kitty Phelps , ROBINA - CNP   40 mg at 08/21/24 0633    senna (SENOKOT) tablet 8.6 mg  1 tablet Oral Daily Kitty Phelps ROBINA - CNP   8.6 mg at 08/20/24 0944    simethicone (MYLICON) chewable tablet 80 mg  80 mg Oral Q6H PRN Kitty Phelps ROBINA - CNP        Sodium Hypochlorite (DAKINS) 0.25 % half strength external soln   Irrigation Daily Kitty Phelps , ROBINA - CNP   Given at 08/18/24 1112    sorbitol 70 % liquid 30 mL  30 mL Oral Daily PRN Kitty Phelps APRN - CNP        tamsulosin (FLOMAX) capsule 0.8 mg  0.8 mg Oral Daily Kitty Phelps , ROBINA - CNP   0.8 mg at 08/21/24 1024    vitamin E capsule 400 Units  400 Units Oral BID Kitty Phelps ROBINA - CNP   400 Units at 08/21/24 1025    zinc sulfate (ZINCATE) 220 mg capsule - elemental zinc 50 mg  50 mg Oral Daily Kitty Phelps APRN - CNP   50 mg at 08/21/24 1024    dexAMETHasone (DECADRON) tablet 6 mg  6 mg Oral Daily Kitty Phelps APRN - CNP   6 mg at 08/21/24 1024    insulin glargine (LANTUS) injection vial 32 Units  32 Units SubCUTAneous Nightly Kitty Phelps APRN - CNP   32 Units at 08/20/24 2152    insulin glargine

## 2024-08-21 NOTE — PROGRESS NOTES
Patient requesting to speak to CM about going to [facility] upon DC rather than [facility].

## 2024-08-21 NOTE — PROGRESS NOTES
Patient transferred to bed 420 in [facility] ,breathing spontaneosly on 3L/min oxygen via nasal cannula and  Telebox 4 attached. Report given to Grisel Alas .Documents tubed to [facility].

## 2024-08-21 NOTE — PROGRESS NOTES
OCCUPATIONAL THERAPY TREATMENT  Patient: Shantanu Casillas (68 y.o. male)  Date: 8/21/2024  Primary Diagnosis: DEMARCO (acute kidney injury) (HCC) [N17.9]  Fluid overload [E87.70]  Volume overload [E87.70]  Hypervolemia, unspecified hypervolemia type [E87.70]  COVID-19 [U07.1]       Precautions: Fall Risk, Isolation (Airborne, droplet precautions)                Recommendations for nursing mobility: Out of bed to chair for meals, Encourage HEP in prep for ADLs/mobility; see handout for details, Frequent repositioning to prevent skin breakdown, Use of bed/chair alarm for safety, Use of BSC for toileting , Assist x1, and elevate B UE for edema management    In place during session: Mid line, Nasal Cannula 3L, and EKG/telemetry  and pulse ox  Chart, occupational therapy assessment, plan of care, and goals were reviewed.  ASSESSMENT  Patient continues with skilled OT services and is progressing towards goals. Pt sitting up on edge of bed upon EDEN arrival, agreeable to session. Pt A&O x 4. Pt able to cut food and feed himself without the use of red .  Therapist provided bariatric BSC.  Unable to stand at this time secondary a bariatric walker needed (therapist unable to find).  Another therapist provided at end of session. Pt participated in UE therex to increase strength and endurance to aid in ADL performance. Pt requires verbal, demonstration, tactile, and visual cues. Pt required modifications to exercises due to edema and weakness. Pt educated on edema management for B UE.  Edema in R UE greater than L.  Pt verbalized understanding. (See below for objective details and assist levels).     Overall pt tolerated session fair today with frequent rest breaks during exercise.  Potential barriers for safe discharge: pts current support system is unable to meet their requirements for physical assistance, pt is a high fall risk, pt is not safe to be alone, and concern for pt safely navigating or managing the home  environment. Current OT recommendations for discharge [facility] . Will continue to benefit from skilled OT services, and will continue to progress as tolerated.      Start of Session End of Session   SPO2 (%) 100 100   Heart Rate (BPM) 67 68     GOALS:    Problem: Occupational Therapy - Adult  Goal: By Discharge: Performs self-care activities at highest level of function for planned discharge setting.  See evaluation for individualized goals.  Description: FUNCTIONAL STATUS PRIOR TO ADMISSION:  Patient was ambulatory using RW as needed for mobility and able to actively assist with Grooming, Bathing, Dressing, and Toileting.    HOME SUPPORT: The patient lived with spouse.    Occupational Therapy Goals:  Initiated 8/19/2024  Patient/Family stated goal: I want to be able to use my hands better  1.  Patient will perform self-feeding with Modified [place] with foam tubing on utensil handle and Additional Time within 7 day(s).  2.  Patient will perform lower body dressing with Moderate Assist, using a reacher for pants while sitting on EOB and Additional Time within 7 day(s).  3.  Patient will perform upper body bathing sitting on the EOB with Minimal Assist, Additional Time, and Assist x1 within 7 day(s).  4.  Patient will perform all aspects of toileting with Moderate Assist, Additional Time, and Assist x1 within 7 day(s).  5.  Patient will participate in upper extremity therapeutic exercise/activities with Modified [place] and Additional Time for 10 minutes within 7 day(s).    6.  Patient will utilize energy conservation techniques during functional activities with no verbal cues within 7 day(s).  Outcome: Progressing        PLAN :  Patient continues to benefit from skilled intervention to address functional impairments. Continue treatment per established plan of care to address goals.    Recommend next OT session: Toileting    Recommendation for discharge: (in order for the patient to  Demonstration;Verbal  Barriers to Learning: None  Education Outcome: Verbalized understanding;Demonstrated understanding;Continued education needed    Thank you for this referral.  ODALIS Escudero  Minutes: 56

## 2024-08-21 NOTE — PROGRESS NOTES
History and Physical    NAME:   Shantanu Casillas   :  1955   MRN:  [id number]     Date/Time: 2024 12:13 PM    Patient PCP: Ed Pino MD  ______________________________________________________________________       Subjective:     CHIEF COMPLAINT:   Shortness of breath      HISTORY OF PRESENT ILLNESS:     General Daily Progress Note  Patient is a 68 y.o. year old male with past medical history of arthritis, coronary artery disease, COPD, diabetes, gout, hypertension, sleep apnea presents to the ED with dyspnea. Patient reports he was diagnosed with COVID 1 week prior, and has been increasingly short of breath since then. Also states he was taken off of his diuretic and has had increasing swelling in the legs and abdomen. He has a productive cough, states he feels like he cannot \"get it out \". No nausea or vomiting. No fevers at home.       HPI-patient alert and in moderate distress.  He endorses shortness of breath, coughing, nausea without vomiting.  He denies chest pain, palpitations, fever, headache, abdominal pain and diarrhea.    Vitals-blood pressure 127/84 pulse 98 temperature 97.5 respirations 18 O2 97%    Labs-potassium 5.4, BUN 67, creatinine 3.38, GFR 19, glucose 248, BNP 3528, ammonia 43, hemoglobin 8.9, hematocrit 28.4, MCV 95, pH 7.32, pCO2 49, D-dimer 2.97    COVID-19 detected on PCR    EKG demonstrates ventricular paced rhythm    Chest x-ray performed 8/15-cardiomegaly and mild pulmonary edema    Consulted nephrology 8/15    Consulted infectious disease 8/15 -  Check CRP, procal, LDH and ferritin  Continue Linezolid for now    Consulted cardiology -may require echocardiogram for further management as patient is clinically stable for me to expose other team member in the hospital to proceed with echocardiogram as it is not deemed necessary based on my clinical assessment. Optimize guideline directed medical management for cardiomyopathy        HPI-patient was lying down  MD Doretha   vitamin E 400 UNIT capsule Take 1 capsule by mouth 2 times daily 6/8/21  Yes Provider, MD Doretha         Current Facility-Administered Medications:     lactobacillus (CULTURELLE) capsule 1 capsule, 1 capsule, Oral, Daily with breakfast, Ed Pino MD, 1 capsule at 08/21/24 1026    propranolol (INDERAL) tablet 40 mg, 40 mg, Oral, BID, Yue Botello MD, 40 mg at 08/21/24 1036    clotrimazole (MYCELEX) matthias 10 mg, 10 mg, Oral, TID, Ed Pino MD, 10 mg at 08/21/24 1026    cefTRIAXone (ROCEPHIN) 2,000 mg in sterile water 20 mL IV syringe, 2,000 mg, IntraVENous, Q24H, Sergei Kolb MD, 2,000 mg at 08/20/24 1521    hydrALAZINE (APRESOLINE) tablet 10 mg, 10 mg, Oral, 3 times per day, Ethan Barker MD, 10 mg at 08/21/24 0633    isosorbide dinitrate (ISORDIL) tablet 20 mg, 20 mg, Oral, TID, Ethan Barker MD, 20 mg at 08/21/24 1036    gabapentin (NEURONTIN) capsule 400 mg, 400 mg, Oral, TID, Bhupendra Hayden MD, 400 mg at 08/21/24 1025    oxyCODONE HCl (OXY-IR) immediate release tablet 10 mg, 10 mg, Oral, q8h, Ed Pino MD, 10 mg at 08/21/24 0633    [Held by provider] furosemide (LASIX) injection 60 mg, 60 mg, IntraVENous, Daily, Bhupendra Hayden MD, 60 mg at 08/18/24 0851    Petrolatum-Zinc Oxide ointment, , Topical, BID, Ed Pino MD, Given at 08/21/24 1027    sodium chloride flush 0.9 % injection 5-40 mL, 5-40 mL, IntraVENous, 2 times per day, Kitty Phelps APRN - CNP, 10 mL at 08/21/24 1028    sodium chloride flush 0.9 % injection 5-40 mL, 5-40 mL, IntraVENous, PRN, Kitty Phelps APRN - CNP    0.9 % sodium chloride infusion, , IntraVENous, PRN, Kitty Phelps, ROBINA - CNP    potassium chloride (KLOR-CON M) extended release tablet 40 mEq, 40 mEq, Oral, PRN **OR** potassium bicarb-citric acid (EFFER-K) effervescent tablet 40 mEq, 40 mEq, Oral, PRN **OR** potassium chloride 10 mEq/100 mL IVPB (Peripheral Line), 10 mEq, IntraVENous, PRN, King  daily  Melatonin 3 mg nightly  Nystatin 500,000 units oral 4 times daily  Protonix 40 Mg oral daily  SENOKOT 8.6 mg oral Daily  Flomax 0.8 Mg oral daily  Zinc sulfate 50 mg oral Daily    Kayexalate 30 g    COVID-19 detected on PCR    EKG demonstrates ventricular paced rhythm    Chest x-ray performed 8/15-cardiomegaly and mild pulmonary edema    PT OT consult discharge in next 2448 hrs.    Follow-up renal function    ________________________________________________________________________    Signed: Ed Pino MD

## 2024-08-21 NOTE — CARE COORDINATION
0725: Chart reviewed.    Per notes; patient on IV ABX followed via cardiology, ID, nephrology, neurology, podiatry and pulmonology.    PT/OT recs for IRF noted.    [facility] has accepted patient when cleared for discharge.    Per  note, patient does not have any skilled nursing days left.    CM will continue to follow patient and recs of medical team.    9658: [facility] has a bed available if patient is medically stable for discharge.

## 2024-08-21 NOTE — PROGRESS NOTES
abnormalities. Grade I diastolic dysfunction with normal LAP.    Right Ventricle: Not well visualized.    Left Atrium: Left atrium is dilated.    Contrast used: Definity.    No pericardial effusio seen on this study.....    Signed by: Ethan Barker MD on 6/20/2023 11:51 AM       MAR reviewed and pertinent medications noted or modified as needed    MEDS:   Current Facility-Administered Medications   Medication Dose Route Frequency Provider Last Rate Last Admin    lactobacillus (CULTURELLE) capsule 1 capsule  1 capsule Oral Daily with breakfast Ed Pino MD   1 capsule at 08/21/24 1026    propranolol (INDERAL) tablet 40 mg  40 mg Oral BID Yue Botello MD   40 mg at 08/21/24 1036    clotrimazole (MYCELEX) matthias 10 mg  10 mg Oral TID Ed Pino MD   10 mg at 08/21/24 1026    cefTRIAXone (ROCEPHIN) 2,000 mg in sterile water 20 mL IV syringe  2,000 mg IntraVENous Q24H Sergei Kolb MD   2,000 mg at 08/20/24 1521    hydrALAZINE (APRESOLINE) tablet 10 mg  10 mg Oral 3 times per day Ethan Barker MD   10 mg at 08/21/24 0633    isosorbide dinitrate (ISORDIL) tablet 20 mg  20 mg Oral TID Ethan Barker MD   20 mg at 08/21/24 1036    gabapentin (NEURONTIN) capsule 400 mg  400 mg Oral TID Bhupendra Hayden MD   400 mg at 08/21/24 1025    oxyCODONE HCl (OXY-IR) immediate release tablet 10 mg  10 mg Oral q8h Ed Pino MD   10 mg at 08/21/24 0633    [Held by provider] furosemide (LASIX) injection 60 mg  60 mg IntraVENous Daily Bhupendra Hayden MD   60 mg at 08/18/24 0851    Petrolatum-Zinc Oxide ointment   Topical BID Ed Pino MD   Given at 08/21/24 1027    sodium chloride flush 0.9 % injection 5-40 mL  5-40 mL IntraVENous 2 times per day Kitty Phelps APRN - CNP   10 mL at 08/21/24 1028    sodium chloride flush 0.9 % injection 5-40 mL  5-40 mL IntraVENous PRN Kitty Phelps, ROBINA - CNP        0.9 % sodium chloride infusion   IntraVENous PRN Kitty Phelps, ROBINA - CNP      4.7 4.5    107   CO2 26 27   GLUCOSE 277* 138*   BUN 91* 97*   CREATININE 3.21* 2.58*   CALCIUM 8.6 8.8   PHOS 3.1 4.4   BILITOT 0.2 0.3   AST 24 24   ALT 22 26     No results for input(s): \"CKTOTAL\", \"CKMB\", \"CKMBINDEX\", \"TROPONINI\" in the last 72 hours.  Lab Results   Component Value Date/Time    PROBNP 6,482 08/19/2024 12:32 AM     04/19/2023 04:41 PM      No results found for: \"TSH\"   Results       Procedure Component Value Units Date/Time    Culture, Respiratory [[id number]]  (Abnormal)  (Susceptibility) Collected: 08/17/24 1400    Order Status: Completed Specimen: Sputum Updated: 08/19/24 1342     Special Requests No Special Requests        Gram Stain       Rare Epithelial cells seen            1+ WBCs seen         3+ Gram Negative Rods               Occasional Gram Positive Cocci in pairs           Culture       Heavy Klebsiella pneumoniae                  Moderate Enterobacter cloacae complex                  No normal respiratory ady isolated          Susceptibility        Klebsiella pneumoniae      BACTERIAL SUSCEPTIBILITY PANEL POWER      amikacin <=2 ug/mL Sensitive      ampicillin 16 ug/mL Resistant      ampicillin-sulbactam 4 ug/mL Sensitive      ceFAZolin <=4 ug/mL Sensitive      cefepime <=1 ug/mL Sensitive      cefOXitin <=4 ug/mL Sensitive      cefTAZidime <=1 ug/mL Sensitive      cefTRIAXone <=1 ug/mL Sensitive      ciprofloxacin <=0.25 ug/mL Sensitive      gentamicin <=1 ug/mL Sensitive      levofloxacin <=0.12 ug/mL Sensitive      meropenem <=0.25 ug/mL Sensitive      piperacillin-tazobactam <=4 ug/mL Sensitive      tobramycin <=1 ug/mL Sensitive      trimethoprim-sulfamethoxazole <=20 ug/mL Sensitive                       Susceptibility        Enterobacter cloacae complex      BACTERIAL SUSCEPTIBILITY PANEL POWER      amikacin <=2 ug/mL Sensitive      ceFAZolin >=64 ug/mL Resistant      cefepime <=1 ug/mL Sensitive      cefOXitin >=64 ug/mL Resistant      cefTAZidime <=1 ug/mL  Sensitive      cefTRIAXone <=1 ug/mL Sensitive      ciprofloxacin <=0.25 ug/mL Sensitive      gentamicin <=1 ug/mL Sensitive      levofloxacin <=0.12 ug/mL Sensitive      tobramycin <=1 ug/mL Sensitive      trimethoprim-sulfamethoxazole <=20 ug/mL Sensitive                           COVID-19, Rapid [[id number]]  (Abnormal) Collected: 08/15/24 1523    Order Status: Completed Specimen: Nasopharyngeal Updated: 08/15/24 1700     Source Swab        SARS-CoV-2, PCR DETECTED        Comment:   Positive results are indicative of the presence of SARS-CoV-2. Clinical correlation with patient history and other diagnostic information is necessary to determine patient infection status. Positive results do not rule out bacterial infection or co-infection with other pathogens.      Testing was performed using osito Della SARS-CoV-2 assay. This test is a multiplex Real-Time Reverse Transcriptase Polymerase Chain Reaction (RT-PCR) based in vitro diagnostic test intended for the qualitative detection of nucleic acids from SARS-CoV-2 in nasopharyngeal and nasal swab specimens,for use under the FDA's Emergency Use Authorization (EUA) only.     Fact sheet for Patients: https://www.fda.gov/media/110705/download  Fact sheet for Healthcare Providers: https://www.fda.gov/media/637131/download CALLED TO AND READ BACK BY Cami [email or web address] [email or web address]         Rapid Strep Screen [[id number]] Collected: 08/12/24 2130    Order Status: Completed Specimen: Blood Serum Updated: 08/13/24 0034     Strep A Ag Negative       Culture, Throat [[id number]] Collected: 08/12/24 2130    Order Status: Completed Specimen: Throat Updated: 08/15/24 0717     Special Requests No Special Requests        Culture       Normal respiratory ady/no beta strep isolated                 Imaging:  XR CHEST PORTABLE   Final Result      Resolved pulmonary edema.         Electronically signed by Madeline Barrett      XR CHEST PORTABLE   Final Result   Cardiomegaly and mild pulmonary  edema.      Electronically signed by Nasir Sebastian            8/20 patient doing better in isolation because of COVID-19 also patient has history of MRSA on Decadron Lasix is on hold chronic leg edema with venous stasis continue trilogy noninvasive ventilator at night and daytime as needed  8/21 uses trilogy machine last night nasal cannula leg swelling improved continue with antibiotics patient has UTI with VRE completed antibiotic linezolid also has CHF fluid overload low EF getting treatment for Klebsiella pneumonia

## 2024-08-21 NOTE — PROGRESS NOTES
Infectious Disease Progress Note           Subjective:   Stable, no sig change in clinical status, reports loose stools and hand tremors, overall feels much better   Objective:   Physical Exam:     BP (!) 144/75 Comment: Nurse Ariane Notified  Pulse 57   Temp 98.4 °F (36.9 °C) (Oral)   Resp 18   Ht 1.803 m (5' 11\")   Wt (!) 165 kg (363 lb 12.1 oz)   SpO2 99%   BMI 50.73 kg/m²  O2 Flow Rate (L/min): 3 L/min O2 Device: Nasal cannula    Temp (24hrs), Av.8 °F (36.6 °C), Min:97.2 °F (36.2 °C), Max:98.4 °F (36.9 °C)    701 - 1900  In: -   Out: 400 [Urine:400]   1901 -  07  In: 1922 [P.O.:]  Out: 3350 [Urine:3350]    General: NAD, AAO x 4  HEENT: REBEKAH, Moist mucosa   Lungs: CTA b/l, decreased at the bases, no wheeze/rhonchi   Heart: S1S2+, RRR, no murmur  Abdo: Soft, NT, ND, +BS   : no chronic juares cath   Exts: decreased b/l leg swelling, compression dressing in place   Skin: b/l leg dressings in place, not examined     Data Review:       Recent Days:    Recent Labs     24  0032 24  1039 24  0821   WBC 10.2 10.1 10.2   HGB 8.9* 9.9* 9.7*   HCT 28.9* 32.1* 30.9*    235 196     Recent Labs     24  0032 24  0821   BUN 91* 97*   CREATININE 3.21* 2.58*       Lab Results   Component Value Date/Time    CRP 3.22 2024 10:39 AM          Microbiology     Results       Procedure Component Value Units Date/Time    COVID-19, Rapid [[id number]]  (Abnormal) Collected: 24 1530    Order Status: Completed Specimen: Nasopharyngeal Updated: 24 1619     Source Nasopharyngeal        SARS-CoV-2, PCR DETECTED        Comment:   Positive results are indicative of the presence of SARS-CoV-2. Clinical correlation with patient history and other diagnostic information is necessary to determine patient infection status. Positive results do not rule out bacterial infection or co-infection with other pathogens.      Testing was performed  using osito Della SARS-CoV-2 assay. This test is a multiplex Real-Time Reverse Transcriptase Polymerase Chain Reaction (RT-PCR) based in vitro diagnostic test intended for the qualitative detection of nucleic acids from SARS-CoV-2 in nasopharyngeal and nasal swab specimens,for use under the FDA's Emergency Use Authorization (EUA) only.     Fact sheet for Patients: https://www.fda.gov/media/210953/download  Fact sheet for Healthcare Providers: https://www.fda.gov/media/200504/download CALLED TO AND READ BACK BY RHEA QUIJANO AT 1618 CW         Culture, Respiratory [[id number]]  (Abnormal)  (Susceptibility) Collected: 08/17/24 1400    Order Status: Completed Specimen: Sputum Updated: 08/19/24 1342     Special Requests No Special Requests        Gram Stain       Rare Epithelial cells seen            1+ WBCs seen         3+ Gram Negative Rods               Occasional Gram Positive Cocci in pairs           Culture       Heavy Klebsiella pneumoniae                  Moderate Enterobacter cloacae complex                  No normal respiratory ady isolated          Susceptibility        Klebsiella pneumoniae      BACTERIAL SUSCEPTIBILITY PANEL POWER      amikacin <=2 ug/mL Sensitive      ampicillin 16 ug/mL Resistant      ampicillin-sulbactam 4 ug/mL Sensitive      ceFAZolin <=4 ug/mL Sensitive      cefepime <=1 ug/mL Sensitive      cefOXitin <=4 ug/mL Sensitive      cefTAZidime <=1 ug/mL Sensitive      cefTRIAXone <=1 ug/mL Sensitive      ciprofloxacin <=0.25 ug/mL Sensitive      gentamicin <=1 ug/mL Sensitive      levofloxacin <=0.12 ug/mL Sensitive      meropenem <=0.25 ug/mL Sensitive      piperacillin-tazobactam <=4 ug/mL Sensitive      tobramycin <=1 ug/mL Sensitive      trimethoprim-sulfamethoxazole <=20 ug/mL Sensitive                       Susceptibility        Enterobacter cloacae complex      BACTERIAL SUSCEPTIBILITY PANEL POWER      amikacin <=2 ug/mL Sensitive      ceFAZolin >=64 ug/mL Resistant      cefepime <=1  signed by Nasir Sebastian      Assessment/Plan     Acute hypoxic respiratory failure likely multifactorial etiology: CHF, Aspiration PNA and COVID infection         Tested positive for COVID 19 on 08/15. E. cloacae complex and K.Pneumoniae isolated from sputum Cx (08/17)         Afebrile, WBC stable at 10K, hemodynamically stable         On day # 5 of Ceftriaxone, tolerating except for diarrhea         Resolving pulmonary edema on CXR (08/20), clear lungs on exam         Routine labs in the morning. On last dose Decadron today (08/21)           2. Chronic stasis ulcers involving b/l legs, MRSA and E.Cloacae isolated from wound Cxs during last admission       Completed 2 wks of linezolid for MRSA coverage, currently on Ceftriaxone for GNR coverage       Continue local wound care and leg elevation     3. UTI w isolation of VRE from recent urine Cx       Completed 2 wks of linezolid on 08/19    4. CHF w decompensation, EF of 35-40%  on 2 D echo    5. Diarrhea, currently a low suspicion for CDI, continue probiotics      Staff deny increased BM frequency. Added prn Imodium    6. Tophaceous gout, continue symptomatic mgt           Sergei Kolb MD    8/21/2024

## 2024-08-21 NOTE — PLAN OF CARE
PHYSICAL THERAPY TREATMENT     Patient: Shantanu Casillas (68 y.o. male)  Date: 8/21/2024  Diagnosis: DEMARCO (acute kidney injury) (HCC) [N17.9]  Fluid overload [E87.70]  Volume overload [E87.70]  Hypervolemia, unspecified hypervolemia type [E87.70]  COVID-19 [U07.1] Volume overload      Precautions: Fall Risk, Isolation (Airborne, droplet precautions)                      Recommendations for nursing mobility: Out of bed to chair for meals, Encourage HEP in prep for ADLs/mobility; see handout for details, Frequent repositioning to prevent skin breakdown, AD and gt belt for bed to chair , and Amb to bathroom with AD and gait belt    In place during session: Nasal Cannula  L, External Catheter, EKG/telemetry , and Pulse ox  Chart, physical therapy assessment, plan of care and goals were reviewed.  ASSESSMENT  Patient continues with skilled PT services and is progressing towards goals. Pt sitting up in the bed upon PT arrival, agreeable to session. Pt A&O x 4. (See below for objective details and assist levels).     Overall pt tolerated session well today with improved mobility and steady ambulation. Pt was able to amb to/from bathroom with RW and CGA. Patient req assist for toileting as pt was unable to reach behind but otherwise demos good standing tolerance and steady amb. Pt does demonstrate flexed posture and req min cueing to stay closer within RW. Pt req min A for sup>sit and mod A with Les to return back to bed. Will continue to benefit from skilled PT services, and will continue to progress as tolerated. Potential barriers for safe discharge: pt is not safe to be alone and concern for pt safely navigating or managing the home environment. Current PT DC recommendation Inpatient Rehabilitation Facility  once medically appropriate.     Start of Session End of Session   SPO2 (%)  100   Heart Rate (BPM)  60     GOALS:    Problem: Physical Therapy - Adult  Goal: By Discharge: Performs mobility at highest level  Good  Standing - Dynamic: Constant support;Fair    Ambulation/Gait Training:    Gait  Gait Training: Yes  Overall Level of Assistance: Contact-guard assistance  Distance (ft): 20 Feet  Assistive Device: Walker, rolling  Base of Support: Widened  Speed/[place]lo[place]: Slow;Shuffled  Gait Abnormalities: Decreased step clearance;Shuffling gait    Pain Ratin/10  reported      Activity Tolerance:   Good and requires rest breaks    After treatment patient left in no apparent distress:   Bed locked and returned to lowest position, Patient left in no apparent distress in bed and Call bell within reach, and nsg updated     COMMUNICATION/COLLABORATION:   The patient’s plan of care was discussed with: Registered nurse and Certified nursing assistant/patient care technician      Nuha Bojorquez \[facility] [place] [facility]\""  Minutes: 36

## 2024-08-21 NOTE — PROGRESS NOTES
Progress Note    Date:2024       Room:[facility]  Patient Name:Shantanu Casillas     YOB: 1955     Age:68 y.o.    Subjective    Subjective   Pt seen at . Denies any new complaints. Receiving a breathing tx at time of visit. Per nursing, no acute overnight events      Review of Systems  A complete ROS was unremarkable outside of HPI      Objective         Vitals Last 24 Hours:  TEMPERATURE:  Temp  Av.7 °F (36.5 °C)  Min: 97.2 °F (36.2 °C)  Max: 98.1 °F (36.7 °C)  RESPIRATIONS RANGE: Resp  Av.9  Min: 17  Max: 18  PULSE OXIMETRY RANGE: SpO2  Av %  Min: 98 %  Max: 100 %  PULSE RANGE: Pulse  Av.3  Min: 60  Max: 70  BLOOD PRESSURE RANGE: Systolic (24hrs), Av , Min:97 , Max:141   ; Diastolic (24hrs), Av, Min:71, Max:83    I/O (24Hr):    Intake/Output Summary (Last 24 hours) at 2024 0953  Last data filed at 2024 0642  Gross per 24 hour   Intake 1700 ml   Output 1450 ml   Net 250 ml     Objective  GEN: Pt is AAOx4 and in NAD. Dressings noted to B/L LE are C/D/I. No family noted at   DERM: Wound dorsal aspect of right first MPJ. Wound medial left leg.  VASC: Pedal pulses (DP/PT) diminished to B/L LE. CFT<3sec to all digits of B/L LE. No pedal hair growth noted to the level of the digits for B/L LE. Skin temp is warm to warm from proximal to distal for B/L LE. Neg homans/santino signs to B/L LE. No varicosities or telangectasias noted to B/L LE. Edema noted bilateral lower extremity.  NEURO: Protective and epicritic sensations grossly absent to B/L LE  MSK: (-) POP, No gross deformities. Decreased muscle tone and bulk noted to B/L LE.  PSYCH: Cooperative with normal mood and affect       Labs/Imaging/Diagnostics    Labs:  CBC:  Recent Labs     24  0032 24  1039 24  0821   WBC 10.2 10.1 10.2   RBC 3.02* 3.36* 3.26*   HGB 8.9* 9.9* 9.7*   HCT 28.9* 32.1* 30.9*   MCV 95.7 95.5 94.8   RDW 20.1* 19.7* 20.6*    235 196     CHEMISTRIES:  Recent Labs

## 2024-08-21 NOTE — PROGRESS NOTES
4 Eyes Skin Assessment     NAME:  Shantanu Casillas  YOB: 1955  MEDICAL RECORD NUMBER:  [id number]    The patient is being assessed for  Transfer to New Unit    I agree that at least one RN has performed a thorough Head to Toe Skin Assessment on the patient. ALL assessment sites listed below have been assessed.      Areas assessed by both nurses:    Head, Face, Ears, Shoulders, Back, Chest, Arms, Elbows, Hands, Sacrum. Buttock, Coccyx, Ischium, Legs. Feet and Heels, and Under Medical Devices         Does the Patient have a Wound? Yes wound(s) were present on assessment. LDA wound assessment was Initiated and completed by RN       Homar Prevention initiated by RN: Yes  Wound Care Orders initiated by RN: Yes    Pressure Injury (Stage 3,4, Unstageable, DTI, NWPT, and Complex wounds) if present, place Wound referral order by RN under : Yes    New Ostomies, if present place, Ostomy referral order under : No     Nurse 1 eSignature: Electronically signed by Evette Burnett LPN on 8/21/24 at 1:52 AM EDT    **SHARE this note so that the co-signing nurse can place an eSignature**    Nurse 2 eSignature: Electronically signed by MARQUIS REYES RN on 8/21/24 at 2:08 AM EDT

## 2024-08-22 LAB
ALBUMIN SERPL-MCNC: 2.5 G/DL (ref 3.5–5)
ALBUMIN/GLOB SERPL: 0.6 (ref 1.1–2.2)
ALP SERPL-CCNC: 75 U/L (ref 45–117)
ALT SERPL-CCNC: 31 U/L (ref 12–78)
ANION GAP SERPL CALC-SCNC: 7 MMOL/L (ref 5–15)
AST SERPL W P-5'-P-CCNC: 28 U/L (ref 15–37)
BILIRUB SERPL-MCNC: 0.3 MG/DL (ref 0.2–1)
BUN SERPL-MCNC: 92 MG/DL (ref 6–20)
BUN/CREAT SERPL: 36 (ref 12–20)
CA-I BLD-MCNC: 8.7 MG/DL (ref 8.5–10.1)
CHLORIDE SERPL-SCNC: 107 MMOL/L (ref 97–108)
CO2 SERPL-SCNC: 26 MMOL/L (ref 21–32)
CREAT SERPL-MCNC: 2.56 MG/DL (ref 0.7–1.3)
ERYTHROCYTE [DISTWIDTH] IN BLOOD BY AUTOMATED COUNT: 21 % (ref 11.5–14.5)
GLOBULIN SER CALC-MCNC: 4 G/DL (ref 2–4)
GLUCOSE BLD STRIP.AUTO-MCNC: 174 MG/DL (ref 65–100)
GLUCOSE BLD STRIP.AUTO-MCNC: 193 MG/DL (ref 65–100)
GLUCOSE BLD STRIP.AUTO-MCNC: 205 MG/DL (ref 65–100)
GLUCOSE BLD STRIP.AUTO-MCNC: 221 MG/DL (ref 65–100)
GLUCOSE BLD STRIP.AUTO-MCNC: 225 MG/DL (ref 65–100)
GLUCOSE SERPL-MCNC: 191 MG/DL (ref 65–100)
HCT VFR BLD AUTO: 31 % (ref 36.6–50.3)
HGB BLD-MCNC: 9.4 G/DL (ref 12.1–17)
MCH RBC QN AUTO: 29.6 PG (ref 26–34)
MCHC RBC AUTO-ENTMCNC: 30.3 G/DL (ref 30–36.5)
MCV RBC AUTO: 97.5 FL (ref 80–99)
NRBC # BLD: 0.07 K/UL (ref 0–0.01)
NRBC BLD-RTO: 0.9 PER 100 WBC
PERFORMED BY:: ABNORMAL
PLATELET # BLD AUTO: 102 K/UL (ref 150–400)
PMV BLD AUTO: 11.6 FL (ref 8.9–12.9)
POTASSIUM SERPL-SCNC: 4.8 MMOL/L (ref 3.5–5.1)
PROT SERPL-MCNC: 6.5 G/DL (ref 6.4–8.2)
RBC # BLD AUTO: 3.18 M/UL (ref 4.1–5.7)
SODIUM SERPL-SCNC: 140 MMOL/L (ref 136–145)
WBC # BLD AUTO: 8.1 K/UL (ref 4.1–11.1)

## 2024-08-22 PROCEDURE — 6370000000 HC RX 637 (ALT 250 FOR IP): Performed by: PSYCHIATRY & NEUROLOGY

## 2024-08-22 PROCEDURE — 2500000003 HC RX 250 WO HCPCS: Performed by: NURSE PRACTITIONER

## 2024-08-22 PROCEDURE — 36415 COLL VENOUS BLD VENIPUNCTURE: CPT

## 2024-08-22 PROCEDURE — 2580000003 HC RX 258: Performed by: INTERNAL MEDICINE

## 2024-08-22 PROCEDURE — 6370000000 HC RX 637 (ALT 250 FOR IP): Performed by: INTERNAL MEDICINE

## 2024-08-22 PROCEDURE — 1100000000 HC RM PRIVATE

## 2024-08-22 PROCEDURE — 94640 AIRWAY INHALATION TREATMENT: CPT

## 2024-08-22 PROCEDURE — 2700000000 HC OXYGEN THERAPY PER DAY

## 2024-08-22 PROCEDURE — 82962 GLUCOSE BLOOD TEST: CPT

## 2024-08-22 PROCEDURE — 6370000000 HC RX 637 (ALT 250 FOR IP): Performed by: NURSE PRACTITIONER

## 2024-08-22 PROCEDURE — 97535 SELF CARE MNGMENT TRAINING: CPT

## 2024-08-22 PROCEDURE — 80053 COMPREHEN METABOLIC PANEL: CPT

## 2024-08-22 PROCEDURE — 6360000002 HC RX W HCPCS: Performed by: INTERNAL MEDICINE

## 2024-08-22 PROCEDURE — 94761 N-INVAS EAR/PLS OXIMETRY MLT: CPT

## 2024-08-22 PROCEDURE — 97530 THERAPEUTIC ACTIVITIES: CPT

## 2024-08-22 PROCEDURE — 2580000003 HC RX 258: Performed by: NURSE PRACTITIONER

## 2024-08-22 PROCEDURE — 99232 SBSQ HOSP IP/OBS MODERATE 35: CPT | Performed by: INTERNAL MEDICINE

## 2024-08-22 PROCEDURE — 85027 COMPLETE CBC AUTOMATED: CPT

## 2024-08-22 PROCEDURE — 6370000000 HC RX 637 (ALT 250 FOR IP): Performed by: FAMILY MEDICINE

## 2024-08-22 RX ADMIN — Medication 1 MG: at 09:14

## 2024-08-22 RX ADMIN — INSULIN LISPRO 4 UNITS: 100 INJECTION, SOLUTION INTRAVENOUS; SUBCUTANEOUS at 13:11

## 2024-08-22 RX ADMIN — HYDRALAZINE HYDROCHLORIDE 10 MG: 10 TABLET ORAL at 21:28

## 2024-08-22 RX ADMIN — CETIRIZINE HYDROCHLORIDE 10 MG: 10 TABLET, FILM COATED ORAL at 21:19

## 2024-08-22 RX ADMIN — FLUTICASONE PROPIONATE 2 SPRAY: 50 SPRAY, METERED NASAL at 09:16

## 2024-08-22 RX ADMIN — OXYCODONE HYDROCHLORIDE 10 MG: 10 TABLET ORAL at 23:50

## 2024-08-22 RX ADMIN — ISOSORBIDE DINITRATE 20 MG: 5 TABLET ORAL at 09:13

## 2024-08-22 RX ADMIN — Medication 400 UNITS: at 09:14

## 2024-08-22 RX ADMIN — TAMSULOSIN HYDROCHLORIDE 0.8 MG: 0.4 CAPSULE ORAL at 09:14

## 2024-08-22 RX ADMIN — ISOSORBIDE DINITRATE 20 MG: 5 TABLET ORAL at 13:12

## 2024-08-22 RX ADMIN — OXYCODONE HYDROCHLORIDE 10 MG: 10 TABLET ORAL at 06:28

## 2024-08-22 RX ADMIN — HYDRALAZINE HYDROCHLORIDE 10 MG: 10 TABLET ORAL at 13:12

## 2024-08-22 RX ADMIN — PANTOPRAZOLE SODIUM 40 MG: 40 TABLET, DELAYED RELEASE ORAL at 06:28

## 2024-08-22 RX ADMIN — INSULIN LISPRO 4 UNITS: 100 INJECTION, SOLUTION INTRAVENOUS; SUBCUTANEOUS at 09:12

## 2024-08-22 RX ADMIN — Medication 3 MG: at 21:13

## 2024-08-22 RX ADMIN — Medication 400 MG: at 09:14

## 2024-08-22 RX ADMIN — CLOTRIMAZOLE 10 MG: 10 LOZENGE ORAL at 21:20

## 2024-08-22 RX ADMIN — INSULIN GLARGINE 32 UNITS: 100 INJECTION, SOLUTION SUBCUTANEOUS at 21:23

## 2024-08-22 RX ADMIN — ACETAMINOPHEN 650 MG: 325 TABLET ORAL at 21:14

## 2024-08-22 RX ADMIN — Medication 2 PUFF: at 08:53

## 2024-08-22 RX ADMIN — GABAPENTIN 400 MG: 400 CAPSULE ORAL at 21:13

## 2024-08-22 RX ADMIN — CEFTRIAXONE SODIUM 2000 MG: 2 INJECTION, POWDER, FOR SOLUTION INTRAMUSCULAR; INTRAVENOUS at 18:19

## 2024-08-22 RX ADMIN — CLOTRIMAZOLE 10 MG: 10 LOZENGE ORAL at 09:15

## 2024-08-22 RX ADMIN — OXYCODONE HYDROCHLORIDE AND ACETAMINOPHEN 1000 MG: 500 TABLET ORAL at 09:14

## 2024-08-22 RX ADMIN — FERROUS SULFATE TAB 325 MG (65 MG ELEMENTAL FE) 325 MG: 325 (65 FE) TAB at 09:14

## 2024-08-22 RX ADMIN — OXYCODONE HYDROCHLORIDE 10 MG: 10 TABLET ORAL at 15:41

## 2024-08-22 RX ADMIN — SODIUM CHLORIDE, PRESERVATIVE FREE 10 ML: 5 INJECTION INTRAVENOUS at 21:22

## 2024-08-22 RX ADMIN — WHITE PETROLATUM,ZINC OXIDE: 57; 17 PASTE TOPICAL at 21:31

## 2024-08-22 RX ADMIN — CALCIUM CARBONATE 500 MG: 500 TABLET, CHEWABLE ORAL at 09:14

## 2024-08-22 RX ADMIN — Medication 1 CAPSULE: at 09:15

## 2024-08-22 RX ADMIN — ZINC SULFATE 220 MG (50 MG) CAPSULE 50 MG: CAPSULE at 09:15

## 2024-08-22 RX ADMIN — GABAPENTIN 400 MG: 400 CAPSULE ORAL at 09:13

## 2024-08-22 RX ADMIN — CLOTRIMAZOLE 10 MG: 10 LOZENGE ORAL at 13:12

## 2024-08-22 RX ADMIN — ATORVASTATIN CALCIUM 40 MG: 40 TABLET, FILM COATED ORAL at 21:18

## 2024-08-22 RX ADMIN — SODIUM CHLORIDE, PRESERVATIVE FREE 10 ML: 5 INJECTION INTRAVENOUS at 09:15

## 2024-08-22 RX ADMIN — PROPRANOLOL HYDROCHLORIDE 40 MG: 40 TABLET ORAL at 09:15

## 2024-08-22 RX ADMIN — Medication 1 CAPSULE: at 09:14

## 2024-08-22 RX ADMIN — PROPRANOLOL HYDROCHLORIDE 40 MG: 40 TABLET ORAL at 21:15

## 2024-08-22 RX ADMIN — CALCIUM CARBONATE 500 MG: 500 TABLET, CHEWABLE ORAL at 21:11

## 2024-08-22 RX ADMIN — GABAPENTIN 400 MG: 400 CAPSULE ORAL at 15:40

## 2024-08-22 RX ADMIN — Medication 1 LOZENGE: at 22:17

## 2024-08-22 RX ADMIN — GUAIFENESIN 200 MG: 200 SOLUTION ORAL at 21:51

## 2024-08-22 RX ADMIN — Medication 400 UNITS: at 21:19

## 2024-08-22 RX ADMIN — Medication 2 PUFF: at 20:59

## 2024-08-22 RX ADMIN — SENNOSIDES 8.6 MG: 8.6 TABLET, FILM COATED ORAL at 09:14

## 2024-08-22 RX ADMIN — ASPIRIN 81 MG CHEWABLE TABLET 81 MG: 81 TABLET CHEWABLE at 09:18

## 2024-08-22 RX ADMIN — Medication 1 LOZENGE: at 15:43

## 2024-08-22 RX ADMIN — OXYCODONE HYDROCHLORIDE AND ACETAMINOPHEN 1000 MG: 500 TABLET ORAL at 21:12

## 2024-08-22 RX ADMIN — WHITE PETROLATUM,ZINC OXIDE: 57; 17 PASTE TOPICAL at 09:17

## 2024-08-22 RX ADMIN — HYDRALAZINE HYDROCHLORIDE 10 MG: 10 TABLET ORAL at 06:28

## 2024-08-22 RX ADMIN — INSULIN GLARGINE 80 UNITS: 100 INJECTION, SOLUTION SUBCUTANEOUS at 09:12

## 2024-08-22 ASSESSMENT — PAIN DESCRIPTION - DESCRIPTORS
DESCRIPTORS: ACHING
DESCRIPTORS: ACHING

## 2024-08-22 ASSESSMENT — PAIN SCALES - GENERAL
PAINLEVEL_OUTOF10: 6
PAINLEVEL_OUTOF10: 3
PAINLEVEL_OUTOF10: 8
PAINLEVEL_OUTOF10: 2
PAINLEVEL_OUTOF10: 3
PAINLEVEL_OUTOF10: 4
PAINLEVEL_OUTOF10: 4
PAINLEVEL_OUTOF10: 3
PAINLEVEL_OUTOF10: 9

## 2024-08-22 ASSESSMENT — PAIN DESCRIPTION - ORIENTATION
ORIENTATION: RIGHT;LEFT
ORIENTATION: RIGHT;LEFT

## 2024-08-22 ASSESSMENT — PAIN DESCRIPTION - PAIN TYPE: TYPE: CHRONIC PAIN

## 2024-08-22 ASSESSMENT — PAIN DESCRIPTION - LOCATION
LOCATION: LEG
LOCATION: GENERALIZED
LOCATION: LEG;BACK;NECK

## 2024-08-22 ASSESSMENT — PAIN SCALES - WONG BAKER: WONGBAKER_NUMERICALRESPONSE: HURTS WHOLE LOT

## 2024-08-22 ASSESSMENT — PAIN - FUNCTIONAL ASSESSMENT: PAIN_FUNCTIONAL_ASSESSMENT: ACTIVITIES ARE NOT PREVENTED

## 2024-08-22 ASSESSMENT — PAIN DESCRIPTION - ONSET: ONSET: GRADUAL

## 2024-08-22 NOTE — PROGRESS NOTES
History and Physical    NAME:   Shantanu Casillas   :  1955   MRN:  [id number]     Date/Time: 2024 11:38 AM    Patient PCP: Ed Pino MD  ______________________________________________________________________       Subjective:     CHIEF COMPLAINT:   Shortness of breath      HISTORY OF PRESENT ILLNESS:     General Daily Progress Note  Patient is a 68 y.o. year old male with past medical history of arthritis, coronary artery disease, COPD, diabetes, gout, hypertension, sleep apnea presents to the ED with dyspnea. Patient reports he was diagnosed with COVID 1 week prior, and has been increasingly short of breath since then. Also states he was taken off of his diuretic and has had increasing swelling in the legs and abdomen. He has a productive cough, states he feels like he cannot \"get it out \". No nausea or vomiting. No fevers at home.       HPI-patient alert and in moderate distress.  He endorses shortness of breath, coughing, nausea without vomiting.  He denies chest pain, palpitations, fever, headache, abdominal pain and diarrhea.    Vitals-blood pressure 127/84 pulse 98 temperature 97.5 respirations 18 O2 97%    Labs-potassium 5.4, BUN 67, creatinine 3.38, GFR 19, glucose 248, BNP 3528, ammonia 43, hemoglobin 8.9, hematocrit 28.4, MCV 95, pH 7.32, pCO2 49, D-dimer 2.97    COVID-19 detected on PCR    EKG demonstrates ventricular paced rhythm    Chest x-ray performed 8/15-cardiomegaly and mild pulmonary edema    Consulted nephrology 8/15    Consulted infectious disease 8/15 -  Check CRP, procal, LDH and ferritin  Continue Linezolid for now    Consulted cardiology -may require echocardiogram for further management as patient is clinically stable for me to expose other team member in the hospital to proceed with echocardiogram as it is not deemed necessary based on my clinical assessment. Optimize guideline directed medical management for cardiomyopathy        HPI-patient was lying down  Ector Hernandez (Float)    CBC    Collection Time: 08/22/24  8:18 AM   Result Value Ref Range    WBC 8.1 4.1 - 11.1 K/uL    RBC 3.18 (L) 4.10 - 5.70 M/uL    Hemoglobin 9.4 (L) 12.1 - 17.0 g/dL    Hematocrit 31.0 (L) 36.6 - 50.3 %    MCV 97.5 80.0 - 99.0 FL    MCH 29.6 26.0 - 34.0 PG    MCHC 30.3 30.0 - 36.5 g/dL    RDW 21.0 (H) 11.5 - 14.5 %    Platelets 102 (L) 150 - 400 K/uL    MPV 11.6 8.9 - 12.9 FL    Nucleated RBCs 0.9 (H) 0.0  WBC    nRBC 0.07 (H) 0.00 - 0.01 K/uL   Comprehensive Metabolic Panel    Collection Time: 08/22/24  8:18 AM   Result Value Ref Range    Sodium 140 136 - 145 mmol/L    Potassium 4.8 3.5 - 5.1 mmol/L    Chloride 107 97 - 108 mmol/L    CO2 26 21 - 32 mmol/L    Anion Gap 7 5 - 15 mmol/L    Glucose 191 (H) 65 - 100 mg/dL    BUN 92 (H) 6 - 20 mg/dL    Creatinine 2.56 (H) 0.70 - 1.30 mg/dL    BUN/Creatinine Ratio 36 (H) 12 - 20      Est, Glom Filt Rate 26 (L) >60 ml/min/1.73m2    Calcium 8.7 8.5 - 10.1 mg/dL    Total Bilirubin 0.3 0.2 - 1.0 mg/dL    AST 28 15 - 37 U/L    ALT 31 12 - 78 U/L    Alk Phosphatase 75 45 - 117 U/L    Total Protein 6.5 6.4 - 8.2 g/dL    Albumin 2.5 (L) 3.5 - 5.0 g/dL    Globulin 4.0 2.0 - 4.0 g/dL    Albumin/Globulin Ratio 0.6 (L) 1.1 - 2.2             Xray Result (most recent):  XR CHEST PORTABLE    Result Date: 8/15/2024  Cardiomegaly and mild pulmonary edema. Electronically signed by Nasir Sebastian       XR CHEST PORTABLE   Final Result      Resolved pulmonary edema.         Electronically signed by Madeline Barrett      XR CHEST PORTABLE   Final Result   Cardiomegaly and mild pulmonary edema.      Electronically signed by Nasir Sebastian           Review of Systems:  Constitutional: Negative for chills and fever.   HENT: Negative.    Eyes: Negative.    Respiratory: Negative.    Cardiovascular: Negative.    Gastrointestinal: Negative for abdominal pain and nausea.   Skin: Negative.    Neurological: Negative.      Objective:   VITALS:    Vitals:    08/22/24 1133   BP: (!)  nightly  Nystatin 500,000 units oral 4 times daily  Protonix 40 Mg oral daily  SENOKOT 8.6 mg oral Daily  Flomax 0.8 Mg oral daily  Zinc sulfate 50 mg oral Daily    Kayexalate 30 g    COVID-19 detected on PCR    EKG demonstrates ventricular paced rhythm    Chest x-ray performed 8/15-cardiomegaly and mild pulmonary edema    PT OT consult discharge in next 2448 hrs.    Follow-up renal function    Possible [facility]    ________________________________________________________________________    Signed: Ed Pino MD

## 2024-08-22 NOTE — CONSULTS
Nutrition Education    Educated on Heart Failure Nutrition Therapy, Heart Healthy Label Reading Tips and Carbohydrate Counting for People With Diabetes.     RD consulted for CHF and DM education. PT reports having a fair appetite and that he is adjusting to food at the hospital. PO intake 100% from breakfast. No N/V/C and denies dysphagia. Reports having diarrhea w LBM 8/22. Med sinlcude ascorbic acid, B-complex vit, TUMS, ferrous sulfate, insulin, magnesium oxide, pantoprazole. Vit E and Zinc sulfate. Abnormal labs include BUN 92, Cr 2.56, GFR 26, Glucose 191, POC glu 161-332, Uric Acid 10.2, Albumin 2.5, H/H 9.4/31. Pt expressed that education is known to him and he is already taking these precautions. Reports monitoring glucose at home and being familiar w Carb Counting. Pt encouraged to re-consult with any questions.     Learners: Patient  Readiness: Acceptance  Method: Explanation and Handout  Response: Verbalizes Understanding  Contact name and number provided.    Della Christiansen RD  Contact Number: [phone] or [phone]

## 2024-08-22 NOTE — PLAN OF CARE
PHYSICAL THERAPY TREATMENT     Patient: Shantanu Casillas (68 y.o. male)  Date: 8/22/2024  Diagnosis: DEMARCO (acute kidney injury) (HCC) [N17.9]  Fluid overload [E87.70]  Volume overload [E87.70]  Hypervolemia, unspecified hypervolemia type [E87.70]  COVID-19 [U07.1] Volume overload      Precautions: Fall Risk, Isolation (Airborne, droplet precautions)                      Recommendations for nursing mobility: Out of bed to chair for meals, Encourage HEP in prep for ADLs/mobility; see handout for details, Frequent repositioning to prevent skin breakdown, LE elevation for management of edema, AD and gt belt for bed to chair , Amb to bathroom with AD and gait belt, and Assist x1    In place during session: Nasal Cannula 3L and External Catheter  Chart, physical therapy assessment, plan of care and goals were reviewed.  ASSESSMENT  Patient continues with skilled PT services and is progressing towards goals. Pt in bed upon PT arrival, agreeable to session. Pt A&O x 4. (See below for objective details and assist levels).     Overall pt tolerated session well today.  Pt very motivated to get up to recliner and PTA was able to locate higher recliner. Patient overall CGA/SBA for mobility however did req min A (HHA) to pull up to the EOB seated. Patient ambulated steadily with RW approx 20 ft around room to recliner with no LOB. Pt does amb with slow, shuffled gait using RW with flexed posture req cues to maintain upright posture. Will continue to benefit from skilled PT services, and will continue to progress as tolerated. Potential barriers for safe discharge: pt is not safe to be alone and concern for pt safely navigating or managing the home environment. Current PT DC recommendation Inpatient Rehabilitation Facility  once medically appropriate.     Start of Session End of Session   SPO2 (%) 100 99   Heart Rate (BPM) 61 69     GOALS:    Problem: Physical Therapy - Adult  Goal: By Discharge: Performs mobility at  highest level of function for planned discharge setting.  See evaluation for individualized goals.  Description: FUNCTIONAL STATUS PRIOR TO ADMISSION: Patient was modified independent using a rollator for functional mobility.    HOME SUPPORT PRIOR TO ADMISSION: The patient lived with wife and daughter but did not require assistance for mobility.    Physical Therapy Goals  Initiated 8/18/2024  Pt stated goal: \"Get my strength back\"  Pt will be I with LE HEP in 7 days.  Pt will perform bed mobility with [place] in 7 days.  Pt will perform transfers with Modified [place] in 7 days.   Pt will amb 50 feet with LRAD safely with Supervision in 7 days.  Pt will demonstrate improvement in static standing balance from Contact Guard Assist to [place] in 7 days.     Outcome: Progressing          PLAN :  Patient continues to benefit from skilled intervention to address functional impairments. Continue treatment per established plan of care to address goals.    Recommendation for discharge: (in order for the patient to meet his/her long term goals)  Inpatient Rehabilitation Facility     IF patient discharges home will need the following DME:continuing to assess with progress     SUBJECTIVE:   Patient stated “I am going to sit up here now and use my computer”    OBJECTIVE DATA SUMMARY:   Critical Behavior:  Orientation  Overall Orientation Status: Within Normal Limits  Orientation Level: Oriented X4  Cognition  Overall Cognitive Status: WNL    Functional Mobility Training:  Bed Mobility:  Bed Mobility Training  Rolling: Stand-by assistance  Supine to Sit: Minimum assistance  Scooting: Stand-by assistance  Transfers:  Transfer Training  Interventions: Visual cues;Verbal cues;Manual cues;Weight shifting training/pressure relief  Sit to Stand: Contact-guard assistance  Stand to Sit: Contact-guard assistance  Balance:  Balance  Sitting: Intact  Standing: Impaired  Standing - Static: Good  Standing - Dynamic: Constant  support;Fair    Ambulation/Gait Training:    Gait  Gait Training: Yes  Overall Level of Assistance: Contact-guard assistance  Distance (ft): 20 Feet  Assistive Device: Walker, rolling  Interventions: Visual cues;Verbal cues;Manual cues;Tactile cues;Weight shifting training/pressure relief  Speed/[place]: Slow;Shuffled  Gait Abnormalities: Decreased step clearance;Shuffling gait    Therapeutic Exercises:   Reviewed LE therex to include marches, LAQ, ankle pumps    Pain Ratin/10  reported    Activity Tolerance:   Good    After treatment patient left in no apparent distress:   Bed locked and returned to lowest position, Patient left in no apparent distress sitting up in chair and Call bell within reach, and nsg updated     COMMUNICATION/COLLABORATION:   The patient’s plan of care was discussed with: Registered nurse    Patient Education  Education Provided: Energy Conservation;Plan of Care;ADL Adaptive Strategies;Home Exercise Program;Transfer Training  Education Provided Comments: PLB, increased breaks, therex  Education Method: Verbal  Barriers to Learning: None  Education Outcome: Verbalized understanding        Nuha Bojorquez PTA  Minutes: 40

## 2024-08-22 NOTE — CARE COORDINATION
2520: Chart reviewed.     Per notes; patient on IV ABX followed via cardiology, ID, nephrology, neurology, podiatry and pulmonology.    Dietitian consulted.     PT/OT recs for IRF noted.     [facility] has accepted patient when cleared for discharge.    Patient interested in going to [facility] per nursing note 08/21/2024.     Referral sent for review.     Per SW note, patient does not have any skilled nursing days left.     CM will continue to follow patient and recs of medical team.     1450: CM attempted to contact patient's spouse to notify her [facility] is reviewing referral. No answer. No VM.    CM met with patient at bedside to share above. Patient appreciative and states he will go to [facility] if [facility] cannot accept.

## 2024-08-22 NOTE — PROGRESS NOTES
Progress Note    Date:2024       Room:[facility]  Patient Name:Shantanu Casillas     YOB: 1955     Age:68 y.o.    Subjective    Subjective   Pt seen at . Denies any new complaints. Pending transfer to IRF. Per nursing, no acute overnight events       Review of Systems  A complete ROS was unremarkable outside of HPI       Objective         Vitals Last 24 Hours:  TEMPERATURE:  Temp  Av.9 °F (36.6 °C)  Min: 97 °F (36.1 °C)  Max: 98.4 °F (36.9 °C)  RESPIRATIONS RANGE: Resp  Av  Min: 16  Max: 20  PULSE OXIMETRY RANGE: SpO2  Av.5 %  Min: 99 %  Max: 100 %  PULSE RANGE: Pulse  Av.3  Min: 57  Max: 66  BLOOD PRESSURE RANGE: Systolic (24hrs), Av , Min:129 , Max:154   ; Diastolic (24hrs), Av, Min:7, Max:97    I/O (24Hr):    Intake/Output Summary (Last 24 hours) at 2024 1041  Last data filed at 2024 0448  Gross per 24 hour   Intake 800 ml   Output 1850 ml   Net -1050 ml     Objective  GEN: Pt is AAOx4 and in NAD. Dressings noted to B/L LE are C/D/I. No family noted at   DERM: Wound dorsal aspect of right first MPJ. Wound medial left leg.  VASC: Pedal pulses (DP/PT) diminished to B/L LE. CFT<3sec to all digits of B/L LE. No pedal hair growth noted to the level of the digits for B/L LE. Skin temp is warm to warm from proximal to distal for B/L LE. Neg homans/santino signs to B/L LE. No varicosities or telangectasias noted to B/L LE. Edema noted bilateral lower extremity.  NEURO: Protective and epicritic sensations grossly absent to B/L LE  MSK: (-) POP, No gross deformities. Decreased muscle tone and bulk noted to B/L LE.  PSYCH: Cooperative with normal mood and affect       Labs/Imaging/Diagnostics    Labs:  CBC:  Recent Labs     24  1039 24  0821 24  0818   WBC 10.1 10.2 8.1   RBC 3.36* 3.26* 3.18*   HGB 9.9* 9.7* 9.4*   HCT 32.1* 30.9* 31.0*   MCV 95.5 94.8 97.5   RDW 19.7* 20.6* 21.0*    196 102*     CHEMISTRIES:  Recent Labs

## 2024-08-22 NOTE — PROGRESS NOTES
Nephrology follow up          Patient: Shantanu Casillas MRN: [id number]  SSN: [id number]    YOB: 1955  Age: 68 y.o.  Sex: male      Subjective:   The pt is seen in the room.  Blood pressures are good  Afebrile  On nasal cannula 3 L  Resolving lower extremity swelling  resolving DEMARCO  Past Medical History:   Diagnosis Date    Arthritis     CAD (coronary artery disease)     Chronic obstructive pulmonary disease (HCC)     Diabetes (HCC)     Gout     Hypertension     Ill-defined condition     Sleep apnea     cpap     Past Surgical History:   Procedure Laterality Date    FOOT DEBRIDEMENT Right 12/15/2023    INCISION AND DRAINAGE RIGHT FIRST METATARSAL PHALANGEAL JOINT performed by Polo Oviedo DPM at [facility] MAIN OR    FOOT SURGERY      right heel - foriegn object    HX VEIN ABLATION ADHESIVE      ORTHOPEDIC SURGERY      back surgery    PACEMAKER      aicd -      Family History   Problem Relation Age of Onset    Diabetes Mother     Heart Disease Father     Hypertension Father     Diabetes Sister     Diabetes Brother     Hypertension Mother     Heart Disease Mother     Kidney Disease Mother      Social History     Tobacco Use    Smoking status: Never    Smokeless tobacco: Never   Substance Use Topics    Alcohol use: Not Currently      Current Facility-Administered Medications   Medication Dose Route Frequency Provider Last Rate Last Admin    loperamide (IMODIUM) capsule 2 mg  2 mg Oral 4x Daily PRN Sergei Kolb MD   2 mg at 08/21/24 1732    lactobacillus (CULTURELLE) capsule 1 capsule  1 capsule Oral Daily with breakfast Ed Pino MD   1 capsule at 08/22/24 0914    propranolol (INDERAL) tablet 40 mg  40 mg Oral BID Yue Botello MD   40 mg at 08/22/24 0915    clotrimazole (MYCELEX) matthias 10 mg  10 mg Oral TID Ed Pino MD   10 mg at 08/22/24 0915    cefTRIAXone (ROCEPHIN) 2,000 mg in sterile water 20 mL IV syringe  2,000 mg IntraVENous Q24H Sergei Kolb MD    2,000 mg at 08/21/24 1732    hydrALAZINE (APRESOLINE) tablet 10 mg  10 mg Oral 3 times per day Ethan Barker MD   10 mg at 08/22/24 0628    isosorbide dinitrate (ISORDIL) tablet 20 mg  20 mg Oral TID Ethan Barker MD   20 mg at 08/22/24 0913    gabapentin (NEURONTIN) capsule 400 mg  400 mg Oral TID Bhupendra Hayden MD   400 mg at 08/22/24 0913    oxyCODONE HCl (OXY-IR) immediate release tablet 10 mg  10 mg Oral q8h Ed Pino MD   10 mg at 08/22/24 0628    [Held by provider] furosemide (LASIX) injection 60 mg  60 mg IntraVENous Daily Bhupendra Hayden MD   60 mg at 08/18/24 0851    Petrolatum-Zinc Oxide ointment   Topical BID Ed Pino MD   Given at 08/22/24 0917    sodium chloride flush 0.9 % injection 5-40 mL  5-40 mL IntraVENous 2 times per day Kitty Phelps APRN - CNP   10 mL at 08/22/24 0915    sodium chloride flush 0.9 % injection 5-40 mL  5-40 mL IntraVENous PRN Kitty Phelps APRN - CNP        0.9 % sodium chloride infusion   IntraVENous PRN Kitty Phelps APRN - CNP        potassium chloride (KLOR-CON M) extended release tablet 40 mEq  40 mEq Oral PRN Kitty Phelps APRN - CNP        Or    potassium bicarb-citric acid (EFFER-K) effervescent tablet 40 mEq  40 mEq Oral PRN Kitty Phelps APRN - FADIA        Or    potassium chloride 10 mEq/100 mL IVPB (Peripheral Line)  10 mEq IntraVENous PRN Kitty Phelps APRN - CNP        magnesium sulfate 2000 mg in 50 mL IVPB premix  2,000 mg IntraVENous PRN Kitty Phelps APRN - CNP        ondansetron (ZOFRAN-ODT) disintegrating tablet 4 mg  4 mg Oral Q8H PRN Kitty Phelps APRN - CNP        Or    ondansetron (ZOFRAN) injection 4 mg  4 mg IntraVENous Q6H PRN Jayde Phelpszabeth M, APRN - CNP        polyethylene glycol (GLYCOLAX) packet 17 g  17 g Oral Daily Kitty Lux APRN - CNP        acetaminophen (TYLENOL) tablet 650 mg  650 mg Oral Q6H Kitty Lux APRN - CNP   650 mg at 08/21/24 0257    Or     acetaminophen (TYLENOL) suppository 650 mg  650 mg Rectal Q6H PRN Kitty Phelps APRN - CNP        albuterol sulfate HFA (PROVENTIL;VENTOLIN;PROAIR) 108 (90 Base) MCG/ACT inhaler 1 puff  1 puff Inhalation Q4H PRN Kitty Phelps APRN - CNP        ascorbic acid (VITAMIN C) tablet 1,000 mg  1,000 mg Oral BID Kitty Phelps APRN - CNP   1,000 mg at 08/22/24 0914    aspirin chewable tablet 81 mg  81 mg Oral Daily Kitty Phelps APRN - CNP   81 mg at 08/22/24 0918    atorvastatin (LIPITOR) tablet 40 mg  40 mg Oral Nightly Kitty Phelps APRN - CNP   40 mg at 08/21/24 2150    b complex vitamins capsule 1 capsule  1 capsule Oral Daily Kitty Phelps APRN - CNP   1 capsule at 08/22/24 0915    Benzocaine-Menthol (CEPACOL) 1 lozenge  1 lozenge Oral Q2H PRN Kitty Phelps APRN - CNP   1 lozenge at 08/21/24 2147    calcium carbonate (TUMS) chewable tablet 500 mg  1 tablet Oral BID Kitty Phelps APRN - CNP   500 mg at 08/22/24 0914    cetirizine (ZYRTEC) tablet 10 mg  10 mg Oral Nightly Kitty Phelps APRN - CNP   10 mg at 08/21/24 2150    clonazePAM (KLONOPIN) tablet 0.25 mg  0.25 mg Oral BID PRN Kitty Phelps APRN - CNP        ferrous sulfate (IRON 325) tablet 325 mg  325 mg Oral Daily with breakfast Kitty Phelps APRN - CNP   325 mg at 08/22/24 0914    fluticasone (FLONASE) 50 MCG/ACT nasal spray 2 spray  2 spray Each Nostril Daily Kitty Phelps APRN - CNP   2 spray at 08/22/24 0916    budesonide-formoterol (SYMBICORT) 80-4.5 MCG/ACT inhaler 2 puff  2 puff Inhalation BID RT Kitty Phelps APRN - CNP   2 puff at 08/22/24 0853    And    tiotropium (SPIRIVA RESPIMAT) 2.5 MCG/ACT inhaler 2 puff  2 puff Inhalation Daily RT Kitty Phelps APRN - CNP   2 puff at 08/22/24 0853    guaiFENesin (ROBITUSSIN) 100 MG/5ML liquid 200 mg  200 mg Oral 4x Daily PRN Kitty Phelps APRN - CNP   200 mg at 08/21/24 4264    hydrALAZINE (APRESOLINE) tablet 25 mg  25 mg Oral Q6H PRN King  ROBINA Diaz - FADIA        hydrOXYzine HCl (ATARAX) tablet 10 mg  10 mg Oral Q6H PRN Kitty Phelps APRN - FADIA        ipratropium 0.5 mg-albuterol 2.5 mg (DUONEB) nebulizer solution 1 Dose  1 Dose Inhalation Q4H PRN Kitty Phelps APRN - CNP        lidocaine 4 % external patch 1 patch  1 patch TransDERmal Daily Kitty Phelps APRN - CNP   1 patch at 08/22/24 0913    magnesium oxide (MAG-OX) tablet 400 mg  400 mg Oral Daily Kitty Phelps APRN - CNP   400 mg at 08/22/24 0914    melatonin tablet 3 mg  3 mg Oral Nightly Kitty Phelps APRN - CNP   3 mg at 08/21/24 2150    Virt-Caps 1 mg  1 capsule Oral QAM Kitty Phelps APRN - CNP   1 mg at 08/22/24 0914    pantoprazole (PROTONIX) tablet 40 mg  40 mg Oral QAM AC Kitty Phelps APRN - CNP   40 mg at 08/22/24 0628    senna (SENOKOT) tablet 8.6 mg  1 tablet Oral Daily Kitty Phelps APRN - CNP   8.6 mg at 08/22/24 0914    simethicone (MYLICON) chewable tablet 80 mg  80 mg Oral Q6H PRN Kitty Phelps APRN - CNP        Sodium Hypochlorite (DAKINS) 0.25 % half strength external soln   Irrigation Daily Kitty Phelps APRN - CNP   Given at 08/18/24 1112    sorbitol 70 % liquid 30 mL  30 mL Oral Daily PRN Kitty Phelps APRN - CNP        tamsulosin (FLOMAX) capsule 0.8 mg  0.8 mg Oral Daily Kitty Phelps APRN - CNP   0.8 mg at 08/22/24 0914    vitamin E capsule 400 Units  400 Units Oral BID Kitty Phelps APRN - CNP   400 Units at 08/22/24 0914    zinc sulfate (ZINCATE) 220 mg capsule - elemental zinc 50 mg  50 mg Oral Daily Kitty Phelps APRN - CNP   50 mg at 08/22/24 0915    insulin glargine (LANTUS) injection vial 32 Units  32 Units SubCUTAneous Nightly Kitty Phelps APRN - CNP   32 Units at 08/21/24 2148    insulin glargine (LANTUS) injection vial 80 Units  80 Units SubCUTAneous Daily Kitty Phelps APRN - CNP   80 Units at 08/22/24 0912    insulin lispro (HUMALOG,ADMELOG) injection vial 0-16 Units

## 2024-08-22 NOTE — PROGRESS NOTES
Infectious Disease Progress Note           Subjective:   Stable, no sig change in clinical status, reports loose stools and hand tremors, overall feels much better, he is afebrile w a normal wbc on routine labs   Objective:   Physical Exam:     BP (!) 127/97   Pulse 59   Temp 98.2 °F (36.8 °C) (Oral)   Resp 16   Ht 1.803 m (5' 11\")   Wt (!) 165 kg (363 lb 12.1 oz)   SpO2 100%   BMI 50.73 kg/m²  O2 Flow Rate (L/min): 3 L/min O2 Device: Nasal cannula    Temp (24hrs), Av.8 °F (36.6 °C), Min:97 °F (36.1 °C), Max:98.2 °F (36.8 °C)    701 -  190  In: -   Out: 800 [Urine:800]   1901 -  0700  In: 1400 [P.O.:1400]  Out: 2900 [Urine:2900]    General: NAD, AAO x 4  HEENT: REBEKAH, Moist mucosa   Lungs: CTA b/l, decreased at the bases, no wheeze/rhonchi   Heart: S1S2+, RRR, no murmur  Abdo: Soft, NT, ND, +BS   : no chronic juares cath   Exts: decreased b/l leg swelling, compression dressing in place   Skin: b/l leg dressings in place, not examined     Data Review:       Recent Days:    Recent Labs     24  1039 24  0821 24  0818   WBC 10.1 10.2 8.1   HGB 9.9* 9.7* 9.4*   HCT 32.1* 30.9* 31.0*    196 102*     Recent Labs     24  0821 24  0818   BUN 97* 92*   CREATININE 2.58* 2.56*       Lab Results   Component Value Date/Time    CRP 3.22 2024 10:39 AM          Microbiology     Results       Procedure Component Value Units Date/Time    COVID-19, Rapid [[id number]]  (Abnormal) Collected: 24 1530    Order Status: Completed Specimen: Nasopharyngeal Updated: 24 1619     Source Nasopharyngeal        SARS-CoV-2, PCR DETECTED        Comment:   Positive results are indicative of the presence of SARS-CoV-2. Clinical correlation with patient history and other diagnostic information is necessary to determine patient infection status. Positive results do not rule out bacterial infection or co-infection with other pathogens.     pulmonary edema. Electronically signed by Nasir Sebastian      Assessment/Plan     Acute hypoxic respiratory failure likely multifactorial etiology: CHF, Aspiration PNA and COVID infection         Tested positive for COVID 19 on 08/15. E. cloacae complex and K.Pneumoniae isolated from sputum Cx (08/17)         Afebrile, WBC normalized on todays        On day # 6/14 of Ceftriaxone, left arm mid line         Routine labs and CXR in the morning     2. Chronic stasis ulcers involving b/l legs, MRSA and E.Cloacae isolated from wound Cxs during last admission       Completed 2 wks of linezolid for MRSA coverage, currently on Ceftriaxone for GNR coverage       Continue local wound care and leg elevation to help w swelling     3. UTI w isolation of VRE from recent urine Cx       Completed 2 wks of linezolid on 08/19    4. CHF w decompensation, EF of 35-40%  on 2 D echo    5. Diarrhea, currently a low suspicion for CDI, continue probiotics      Continue probiotics and prn imodium     6. 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

## 2024-08-22 NOTE — PROGRESS NOTES
Spiritual Health Assessment/Progress Note  [facility]    Initial Encounter,  , Life Adjustments, Adjustment to illness,      Name: Shantanu Casillas MRN: [id number]    Age: 68 y.o.     Sex: male   Language: English   Lutheran: Evangelical   Volume overload     Date: 8/22/2024            Total Time Calculated: 25 min              Spiritual Assessment began in [facility] ONCOLOGY        Referral/Consult From: Rounding   Encounter Overview/Reason: Initial Encounter  Service Provided For: Patient    Lakesha, Belief, Meaning:   Patient identifies as spiritual  Family/Friends None      Importance and Influence:  Patient has spiritual/personal beliefs that influence decisions regarding their health  Family/Friends no family/friends present    Community:  Patient feels well-supported. Support system includes: Spouse/Partner and Children  Family/Friends Other:      Assessment and Plan of Care:     Patient Interventions include: Facilitated expression of thoughts and feelings, Explored spiritual coping/struggle/distress and theological reflection, Affirmed coping skills/support systems, and Engaged in life review and/or legacy  Family/Friends Interventions include: Other:      Patient Plan of Care: No future visits per patient/family request  Family/Friends Plan of Care: Other:    Patient seen by NIKHIL Franks  Intern for Initial Spiritual Health Assessment. Patient is alone in his room resting in bed. Patient shared  he is feeling much better than when first admitted. States he enjoys his family and cat. Listened attentively providing time and space for reflection and sharing of life's sacred events. Provided words of comfort and encouragement. Maintained presence of ministry. No further visits required.  NIKHIL Franksin Intern  Electronically signed by Chaplain Chilango Franks on 8/22/2024 at 4:02 PM

## 2024-08-22 NOTE — PROGRESS NOTES
OCCUPATIONAL THERAPY TREATMENT  Patient: Shantanu Casillas (68 y.o. male)  Date: 8/22/2024  Primary Diagnosis: DEMARCO (acute kidney injury) (HCC) [N17.9]  Fluid overload [E87.70]  Volume overload [E87.70]  Hypervolemia, unspecified hypervolemia type [E87.70]  COVID-19 [U07.1]       Precautions: Fall Risk, Isolation (Airborne, droplet precautions)                Recommendations for nursing mobility: Out of bed to chair for meals, Encourage HEP in prep for ADLs/mobility; see handout for details, Frequent repositioning to prevent skin breakdown, AD and gt belt for bed to chair , Amb to bathroom with AD and gait belt, and Assist x1    In place during session: Nasal Cannula 3L and External Catheter  Chart, occupational therapy assessment, plan of care, and goals were reviewed.  ASSESSMENT  Patient continues with skilled OT services and is progressing towards goals. Pt presented in recliner upon EDEN arrival, agreeable to session. Pt A&O x 4. Pt just completing lunch.  Pt was set up for grooming and UB bathing. Pt required assistance to doff/don hospital gown off/over shoulders and to get from underneath him.  The then stood and ambulated around the bed to sit EOB on the other side.  Pt was left sitting EOB with CNA.  All needs met. (See below for objective details and assist levels).     Overall pt tolerated session fair today with rest breaks. Pt continues to benefit from skilled OT to increase strength, endurance, independence with basic self care and functional tranfers. Potential barriers for safe discharge: pts current support system is unable to meet their requirements for physical assistance, pt is a high fall risk, pt is not safe to be alone, and concern for pt safely navigating or managing the home environment. Current OT recommendations for discharge [facility] . Will continue to benefit from skilled OT services, and will continue to progress as tolerated.      Start of Session End of  for myself.”      OBJECTIVE DATA SUMMARY:   Cognitive/Behavioral Status:  Orientation  Overall Orientation Status: Within Normal Limits  Orientation Level: Oriented X4  Cognition  Overall Cognitive Status: WNL    Functional Mobility and Transfers for ADLs:  Bed Mobility:  Bed Mobility Training  Rolling: Stand-by assistance  Supine to Sit: Minimum assistance  Scooting: Stand-by assistance    Transfers:  Transfer Training  Interventions: Visual cues;Verbal cues;Manual cues;Weight shifting training/pressure relief  Sit to Stand: Contact-guard assistance;Additional time;Adaptive equipment  Stand to Sit: Contact-guard assistance  Bed to Chair: Contact-guard assistance;Assist X1;Additional time;Adaptive equipment      Balance:  Balance  Sitting: Intact  Standing: Impaired  Standing - Static: Good  Standing - Dynamic: Constant support;Fair      ADL Intervention:    Grooming: Setup   Grooming Skilled Clinical Factors: seated in recliner    UE Bathing: Setup    UE Dressing: Moderate assistance  UE Dressing Skilled Clinical Factors: doffing/[facility] gown while sitting in recliner      Pain Ratin/10   Pain Intervention(s):   pain is at a level acceptable to the patient    Activity Tolerance:   Fair  and requires rest breaks    After treatment patient left in no apparent distress:   Bed locked and returned to lowest position, pt left sitting EOB with CNA in room, and nsg updated     COMMUNICATION/EDUCATION:   The patient’s plan of care was discussed with: Registered nurse    Patient Education  Education Given To: Patient  Education Provided: Plan of Care;Transfer Training  Education Method: Demonstration;Verbal  Barriers to Learning: None  Education Outcome: Demonstrated understanding    Thank you for this referral.  ODALIS Escudero  Minutes: 43

## 2024-08-22 NOTE — PROGRESS NOTES
IMPRESSION:   Acute on chronic hypoxic respiratory failure chronically on home oxygen and trilogy noninvasive ventilator  Congestive heart failure with exacerbation  COPD  COVID-19 pneumonia  Pneumonia with Klebsiella  Chronic leg edema  Chronic hypoxic hypercapnic respiratory failure on noninvasive ventilator  Obstructive sleep apnea  Acute on chronic kidney disease stage III  Congestive heart failure  Chronic kidney disease stage IV  Chronic leg edema cellulitis right lower extremity venous stasis  Obstructive sleep apnea with chronic hypercapnic respiratory failure on noninvasive ventilator at home trilogy with settings rate 18  PEEP 5 2 L bleed in      RECOMMENDATIONS/PLAN:   68-year-old male came in because of fluid overload short of breath dyspneic on noninvasive ventilator recently treated for COVID-19 pneumonia  Last ABG done on 8/14 shows pCO2 49 pO2 86 on 2 L nasal cannula  He is on trilogy noninvasive ventilator at home rate 18 PEEP 5  FiO2 30%   Leg swelling improved bandage in place     [x] High complexity decision making was performed  [x] See my orders for details  HPI  68-year-old male came in because of generalized weakness shortness of breath dyspnea fluid overload significant history of chronic hypoxic hypercapnic respiratory failure on trilogy noninvasive ventilator he is having swelling of the lower extremity he was recently treated with COVID-19 pneumonia denies any history of chest pain tachypneic tachycardic he was getting treatment for COVID-19 pneumonia 1 week ago now admitted and pulmonary consult was called.    PMH:  has a past medical history of Arthritis, CAD (coronary artery disease), Chronic obstructive pulmonary disease (HCC), Diabetes (HCC), Gout, Hypertension, Ill-defined condition, and Sleep apnea.    PSH:   has a past surgical history that includes pacemaker; hx vein ablation adhesive; orthopedic surgery; Foot surgery; and Foot Debridement (Right, 12/15/2023).  sodium chloride infusion   IntraVENous PRN Kitty Phelps APRN - CNP        potassium chloride (KLOR-CON M) extended release tablet 40 mEq  40 mEq Oral PRN Kitty Phelps APRN - CNP        Or    potassium bicarb-citric acid (EFFER-K) effervescent tablet 40 mEq  40 mEq Oral PRN Kitty Phelps APRN - CNP        Or    potassium chloride 10 mEq/100 mL IVPB (Peripheral Line)  10 mEq IntraVENous PRN Kitty Phelps APRN - CNP        magnesium sulfate 2000 mg in 50 mL IVPB premix  2,000 mg IntraVENous PRN Kitty Phelps APRN - CNP        ondansetron (ZOFRAN-ODT) disintegrating tablet 4 mg  4 mg Oral Q8H PRN Kitty Phelps APRN - CNP        Or    ondansetron (ZOFRAN) injection 4 mg  4 mg IntraVENous Q6H PRN Kitty Phelps APRN - CNP        polyethylene glycol (GLYCOLAX) packet 17 g  17 g Oral Daily PRN Kitty Phelps APRN - CNP        acetaminophen (TYLENOL) tablet 650 mg  650 mg Oral Q6H PRN Kitty Phelps APRN - CNP   650 mg at 08/21/24 0257    Or    acetaminophen (TYLENOL) suppository 650 mg  650 mg Rectal Q6H PRN Kitty Phelps APRN - CNP        albuterol sulfate HFA (PROVENTIL;VENTOLIN;PROAIR) 108 (90 Base) MCG/ACT inhaler 1 puff  1 puff Inhalation Q4H PRN Kitty Phelps APRN - CNP        ascorbic acid (VITAMIN C) tablet 1,000 mg  1,000 mg Oral BID Kitty Phelps APRN - CNP   1,000 mg at 08/21/24 2150    aspirin chewable tablet 81 mg  81 mg Oral Daily Kitty Phelps APRN - CNP   81 mg at 08/21/24 1025    atorvastatin (LIPITOR) tablet 40 mg  40 mg Oral Nightly Kitty Phelps APRN - CNP   40 mg at 08/21/24 2150    b complex vitamins capsule 1 capsule  1 capsule Oral Daily Kitty Phelps APRN - CNP   1 capsule at 08/21/24 1027    Benzocaine-Menthol (CEPACOL) 1 lozenge  1 lozenge Oral Q2H PRN Kitty Phelps APRN - CNP   1 lozenge at 08/21/24 2147    calcium carbonate (TUMS) chewable tablet 500 mg  1 tablet Oral BID Kitty Phelps APRN - CNP   500 mg at  ug/mL Sensitive      ampicillin 16 ug/mL Resistant      ampicillin-sulbactam 4 ug/mL Sensitive      ceFAZolin <=4 ug/mL Sensitive      cefepime <=1 ug/mL Sensitive      cefOXitin <=4 ug/mL Sensitive      cefTAZidime <=1 ug/mL Sensitive      cefTRIAXone <=1 ug/mL Sensitive      ciprofloxacin <=0.25 ug/mL Sensitive      gentamicin <=1 ug/mL Sensitive      levofloxacin <=0.12 ug/mL Sensitive      meropenem <=0.25 ug/mL Sensitive      piperacillin-tazobactam <=4 ug/mL Sensitive      tobramycin <=1 ug/mL Sensitive      trimethoprim-sulfamethoxazole <=20 ug/mL Sensitive                       Susceptibility        Enterobacter cloacae complex      BACTERIAL SUSCEPTIBILITY PANEL POWER      amikacin <=2 ug/mL Sensitive      ceFAZolin >=64 ug/mL Resistant      cefepime <=1 ug/mL Sensitive      cefOXitin >=64 ug/mL Resistant      cefTAZidime <=1 ug/mL Sensitive      cefTRIAXone <=1 ug/mL Sensitive      ciprofloxacin <=0.25 ug/mL Sensitive      gentamicin <=1 ug/mL Sensitive      levofloxacin <=0.12 ug/mL Sensitive      tobramycin <=1 ug/mL Sensitive      trimethoprim-sulfamethoxazole <=20 ug/mL Sensitive                           COVID-19, Rapid [[id number]]  (Abnormal) Collected: 08/15/24 1523    Order Status: Completed Specimen: Nasopharyngeal Updated: 08/15/24 1700     Source Swab        SARS-CoV-2, PCR DETECTED        Comment:   Positive results are indicative of the presence of SARS-CoV-2. Clinical correlation with patient history and other diagnostic information is necessary to determine patient infection status. Positive results do not rule out bacterial infection or co-infection with other pathogens.      Testing was performed using [employer] Della SARS-CoV-2 assay. This test is a multiplex Real-Time Reverse Transcriptase Polymerase Chain Reaction (RT-PCR) based in vitro diagnostic test intended for the qualitative detection of nucleic acids from SARS-CoV-2 in nasopharyngeal and nasal swab specimens,for use under the FDA's

## 2024-08-23 ENCOUNTER — APPOINTMENT (OUTPATIENT)
Facility: HOSPITAL | Age: 69
End: 2024-08-23
Payer: MEDICARE

## 2024-08-23 LAB
ALBUMIN SERPL-MCNC: 2.7 G/DL (ref 3.5–5)
ALBUMIN/GLOB SERPL: 0.6 (ref 1.1–2.2)
ALP SERPL-CCNC: 80 U/L (ref 45–117)
ALT SERPL-CCNC: 35 U/L (ref 12–78)
ANION GAP SERPL CALC-SCNC: 6 MMOL/L (ref 5–15)
AST SERPL W P-5'-P-CCNC: 32 U/L (ref 15–37)
BILIRUB SERPL-MCNC: 0.4 MG/DL (ref 0.2–1)
BNP SERPL-MCNC: 3041 PG/ML
BUN SERPL-MCNC: 83 MG/DL (ref 6–20)
BUN/CREAT SERPL: 38 (ref 12–20)
CA-I BLD-MCNC: 8.9 MG/DL (ref 8.5–10.1)
CHLORIDE SERPL-SCNC: 109 MMOL/L (ref 97–108)
CO2 SERPL-SCNC: 26 MMOL/L (ref 21–32)
CREAT SERPL-MCNC: 2.18 MG/DL (ref 0.7–1.3)
ERYTHROCYTE [DISTWIDTH] IN BLOOD BY AUTOMATED COUNT: 21.7 % (ref 11.5–14.5)
GLOBULIN SER CALC-MCNC: 4.2 G/DL (ref 2–4)
GLUCOSE BLD STRIP.AUTO-MCNC: 167 MG/DL (ref 65–100)
GLUCOSE BLD STRIP.AUTO-MCNC: 179 MG/DL (ref 65–100)
GLUCOSE BLD STRIP.AUTO-MCNC: 196 MG/DL (ref 65–100)
GLUCOSE BLD STRIP.AUTO-MCNC: 299 MG/DL (ref 65–100)
GLUCOSE BLD STRIP.AUTO-MCNC: 80 MG/DL (ref 65–100)
GLUCOSE SERPL-MCNC: 127 MG/DL (ref 65–100)
HCT VFR BLD AUTO: 36.8 % (ref 36.6–50.3)
HGB BLD-MCNC: 10.8 G/DL (ref 12.1–17)
MCH RBC QN AUTO: 29.6 PG (ref 26–34)
MCHC RBC AUTO-ENTMCNC: 29.3 G/DL (ref 30–36.5)
MCV RBC AUTO: 100.8 FL (ref 80–99)
NRBC # BLD: 0.04 K/UL (ref 0–0.01)
NRBC BLD-RTO: 0.4 PER 100 WBC
PERFORMED BY:: ABNORMAL
PERFORMED BY:: NORMAL
PLATELET # BLD AUTO: 164 K/UL (ref 150–400)
PMV BLD AUTO: 11.6 FL (ref 8.9–12.9)
POTASSIUM SERPL-SCNC: 4.5 MMOL/L (ref 3.5–5.1)
PROT SERPL-MCNC: 6.9 G/DL (ref 6.4–8.2)
RBC # BLD AUTO: 3.65 M/UL (ref 4.1–5.7)
SODIUM SERPL-SCNC: 141 MMOL/L (ref 136–145)
WBC # BLD AUTO: 11.2 K/UL (ref 4.1–11.1)

## 2024-08-23 PROCEDURE — 6370000000 HC RX 637 (ALT 250 FOR IP): Performed by: FAMILY MEDICINE

## 2024-08-23 PROCEDURE — 2580000003 HC RX 258: Performed by: INTERNAL MEDICINE

## 2024-08-23 PROCEDURE — 36415 COLL VENOUS BLD VENIPUNCTURE: CPT

## 2024-08-23 PROCEDURE — 99232 SBSQ HOSP IP/OBS MODERATE 35: CPT | Performed by: INTERNAL MEDICINE

## 2024-08-23 PROCEDURE — 82962 GLUCOSE BLOOD TEST: CPT

## 2024-08-23 PROCEDURE — 6360000002 HC RX W HCPCS: Performed by: INTERNAL MEDICINE

## 2024-08-23 PROCEDURE — 97530 THERAPEUTIC ACTIVITIES: CPT

## 2024-08-23 PROCEDURE — 6370000000 HC RX 637 (ALT 250 FOR IP): Performed by: INTERNAL MEDICINE

## 2024-08-23 PROCEDURE — 1100000000 HC RM PRIVATE

## 2024-08-23 PROCEDURE — 2580000003 HC RX 258: Performed by: NURSE PRACTITIONER

## 2024-08-23 PROCEDURE — 85027 COMPLETE CBC AUTOMATED: CPT

## 2024-08-23 PROCEDURE — 97110 THERAPEUTIC EXERCISES: CPT

## 2024-08-23 PROCEDURE — 71045 X-RAY EXAM CHEST 1 VIEW: CPT

## 2024-08-23 PROCEDURE — 83880 ASSAY OF NATRIURETIC PEPTIDE: CPT

## 2024-08-23 PROCEDURE — 6370000000 HC RX 637 (ALT 250 FOR IP): Performed by: NURSE PRACTITIONER

## 2024-08-23 PROCEDURE — 94640 AIRWAY INHALATION TREATMENT: CPT

## 2024-08-23 PROCEDURE — 2700000000 HC OXYGEN THERAPY PER DAY

## 2024-08-23 PROCEDURE — 6370000000 HC RX 637 (ALT 250 FOR IP): Performed by: PSYCHIATRY & NEUROLOGY

## 2024-08-23 PROCEDURE — 94761 N-INVAS EAR/PLS OXIMETRY MLT: CPT

## 2024-08-23 PROCEDURE — 80053 COMPREHEN METABOLIC PANEL: CPT

## 2024-08-23 RX ORDER — BUMETANIDE 1 MG/1
1 TABLET ORAL 2 TIMES DAILY
Status: DISCONTINUED | OUTPATIENT
Start: 2024-08-24 | End: 2024-08-24 | Stop reason: HOSPADM

## 2024-08-23 RX ORDER — LACTOBACILLUS RHAMNOSUS GG 10B CELL
1 CAPSULE ORAL
Qty: 30 CAPSULE | Refills: 0 | Status: SHIPPED | OUTPATIENT
Start: 2024-08-24

## 2024-08-23 RX ORDER — PROPRANOLOL HYDROCHLORIDE 40 MG/1
40 TABLET ORAL 2 TIMES DAILY
Qty: 90 TABLET | Refills: 3 | Status: ON HOLD | OUTPATIENT
Start: 2024-08-23 | End: 2024-09-16 | Stop reason: HOSPADM

## 2024-08-23 RX ORDER — ISOSORBIDE DINITRATE 20 MG/1
20 TABLET ORAL 3 TIMES DAILY
Qty: 90 TABLET | Refills: 3 | Status: ON HOLD | OUTPATIENT
Start: 2024-08-23 | End: 2024-09-16 | Stop reason: HOSPADM

## 2024-08-23 RX ADMIN — PROPRANOLOL HYDROCHLORIDE 40 MG: 40 TABLET ORAL at 08:36

## 2024-08-23 RX ADMIN — Medication 1 MG: at 08:36

## 2024-08-23 RX ADMIN — OXYCODONE HYDROCHLORIDE AND ACETAMINOPHEN 1000 MG: 500 TABLET ORAL at 20:47

## 2024-08-23 RX ADMIN — HYDRALAZINE HYDROCHLORIDE 10 MG: 10 TABLET ORAL at 06:33

## 2024-08-23 RX ADMIN — OXYCODONE HYDROCHLORIDE 10 MG: 10 TABLET ORAL at 22:55

## 2024-08-23 RX ADMIN — ISOSORBIDE DINITRATE 20 MG: 5 TABLET ORAL at 17:46

## 2024-08-23 RX ADMIN — ZINC SULFATE 220 MG (50 MG) CAPSULE 50 MG: CAPSULE at 08:37

## 2024-08-23 RX ADMIN — CEFEPIME 2000 MG: 2 INJECTION, POWDER, FOR SOLUTION INTRAVENOUS at 14:03

## 2024-08-23 RX ADMIN — INSULIN GLARGINE 32 UNITS: 100 INJECTION, SOLUTION SUBCUTANEOUS at 20:48

## 2024-08-23 RX ADMIN — HYOSCYAMINE SULFATE: 16 SOLUTION at 08:43

## 2024-08-23 RX ADMIN — PROPRANOLOL HYDROCHLORIDE 40 MG: 40 TABLET ORAL at 20:46

## 2024-08-23 RX ADMIN — WHITE PETROLATUM,ZINC OXIDE: 57; 17 PASTE TOPICAL at 20:49

## 2024-08-23 RX ADMIN — ATORVASTATIN CALCIUM 40 MG: 40 TABLET, FILM COATED ORAL at 20:48

## 2024-08-23 RX ADMIN — GABAPENTIN 400 MG: 400 CAPSULE ORAL at 14:05

## 2024-08-23 RX ADMIN — PANTOPRAZOLE SODIUM 40 MG: 40 TABLET, DELAYED RELEASE ORAL at 06:32

## 2024-08-23 RX ADMIN — CLOTRIMAZOLE 10 MG: 10 LOZENGE ORAL at 20:48

## 2024-08-23 RX ADMIN — Medication 400 UNITS: at 08:37

## 2024-08-23 RX ADMIN — Medication 2 PUFF: at 21:48

## 2024-08-23 RX ADMIN — GABAPENTIN 400 MG: 400 CAPSULE ORAL at 08:36

## 2024-08-23 RX ADMIN — OXYCODONE HYDROCHLORIDE 10 MG: 10 TABLET ORAL at 06:33

## 2024-08-23 RX ADMIN — Medication 2 PUFF: at 09:42

## 2024-08-23 RX ADMIN — HYDRALAZINE HYDROCHLORIDE 10 MG: 10 TABLET ORAL at 22:10

## 2024-08-23 RX ADMIN — CLOTRIMAZOLE 10 MG: 10 LOZENGE ORAL at 08:36

## 2024-08-23 RX ADMIN — ISOSORBIDE DINITRATE 20 MG: 5 TABLET ORAL at 14:04

## 2024-08-23 RX ADMIN — ASPIRIN 81 MG CHEWABLE TABLET 81 MG: 81 TABLET CHEWABLE at 08:36

## 2024-08-23 RX ADMIN — SODIUM CHLORIDE, PRESERVATIVE FREE 10 ML: 5 INJECTION INTRAVENOUS at 20:48

## 2024-08-23 RX ADMIN — SENNOSIDES 8.6 MG: 8.6 TABLET, FILM COATED ORAL at 08:36

## 2024-08-23 RX ADMIN — Medication 1 CAPSULE: at 08:36

## 2024-08-23 RX ADMIN — Medication 400 MG: at 08:36

## 2024-08-23 RX ADMIN — FERROUS SULFATE TAB 325 MG (65 MG ELEMENTAL FE) 325 MG: 325 (65 FE) TAB at 08:37

## 2024-08-23 RX ADMIN — Medication 400 UNITS: at 20:48

## 2024-08-23 RX ADMIN — Medication 3 MG: at 20:47

## 2024-08-23 RX ADMIN — WHITE PETROLATUM,ZINC OXIDE: 57; 17 PASTE TOPICAL at 08:42

## 2024-08-23 RX ADMIN — CLOTRIMAZOLE 10 MG: 10 LOZENGE ORAL at 14:05

## 2024-08-23 RX ADMIN — CALCIUM CARBONATE 500 MG: 500 TABLET, CHEWABLE ORAL at 20:47

## 2024-08-23 RX ADMIN — CETIRIZINE HYDROCHLORIDE 10 MG: 10 TABLET, FILM COATED ORAL at 20:47

## 2024-08-23 RX ADMIN — SODIUM CHLORIDE, PRESERVATIVE FREE 10 ML: 5 INJECTION INTRAVENOUS at 08:39

## 2024-08-23 RX ADMIN — GABAPENTIN 400 MG: 400 CAPSULE ORAL at 20:47

## 2024-08-23 RX ADMIN — HYDRALAZINE HYDROCHLORIDE 10 MG: 10 TABLET ORAL at 14:05

## 2024-08-23 RX ADMIN — ISOSORBIDE DINITRATE 20 MG: 5 TABLET ORAL at 08:36

## 2024-08-23 RX ADMIN — INSULIN GLARGINE 80 UNITS: 100 INJECTION, SOLUTION SUBCUTANEOUS at 10:07

## 2024-08-23 RX ADMIN — OXYCODONE HYDROCHLORIDE 10 MG: 10 TABLET ORAL at 15:24

## 2024-08-23 RX ADMIN — FLUTICASONE PROPIONATE 2 SPRAY: 50 SPRAY, METERED NASAL at 08:38

## 2024-08-23 RX ADMIN — TAMSULOSIN HYDROCHLORIDE 0.8 MG: 0.4 CAPSULE ORAL at 08:37

## 2024-08-23 RX ADMIN — CALCIUM CARBONATE 500 MG: 500 TABLET, CHEWABLE ORAL at 08:37

## 2024-08-23 RX ADMIN — OXYCODONE HYDROCHLORIDE AND ACETAMINOPHEN 1000 MG: 500 TABLET ORAL at 08:37

## 2024-08-23 ASSESSMENT — PAIN SCALES - GENERAL
PAINLEVEL_OUTOF10: 4
PAINLEVEL_OUTOF10: 0
PAINLEVEL_OUTOF10: 8
PAINLEVEL_OUTOF10: 0
PAINLEVEL_OUTOF10: 9
PAINLEVEL_OUTOF10: 8
PAINLEVEL_OUTOF10: 0
PAINLEVEL_OUTOF10: 4

## 2024-08-23 ASSESSMENT — PAIN DESCRIPTION - DESCRIPTORS
DESCRIPTORS: ACHING
DESCRIPTORS: SHARP
DESCRIPTORS: ACHING

## 2024-08-23 ASSESSMENT — PAIN DESCRIPTION - LOCATION
LOCATION: GENERALIZED
LOCATION: GENERALIZED

## 2024-08-23 ASSESSMENT — PAIN DESCRIPTION - ORIENTATION: ORIENTATION: RIGHT;LEFT;LOWER

## 2024-08-23 ASSESSMENT — PAIN SCALES - WONG BAKER: WONGBAKER_NUMERICALRESPONSE: NO HURT

## 2024-08-23 NOTE — DISCHARGE SUMMARY
Discharge Summary       PATIENT ID: Shantanu Casillas  MRN: [id number]   YOB: 1955    DATE OF ADMISSION: 8/15/2024   DATE OF DISCHARGE:   PRIMARY CARE PROVIDER: [unfilled]      ATTENDING PHYSICIAN: Ed Pino  DISCHARGING PROVIDER: Ed Pino      CONSULTATIONS: IP CONSULT TO NEPHROLOGY  IP CONSULT TO CARDIOLOGY  IP WOUND CARE NURSE CONSULT TO EVAL  IP CONSULT TO INFECTIOUS DISEASES  IP CONSULT TO PODIATRY  IP CONSULT TO PULMONOLOGY  IP CONSULT TO DIETITIAN  IP CONSULT TO TELE-NEUROLOGY    PROCEDURES/SURGERIES: * No surgery found *    ADMITTING DIAGNOSES:    Patient Active Problem List    Diagnosis Date Noted    Acute and chronic respiratory failure with hypoxia (HCC) 08/20/2024    Volume overload 08/15/2024    Fluid overload 08/15/2024    Ulcer of right foot with fat layer exposed (HCC) 07/19/2024    Generalized edema 02/19/2024    Acute pneumonia 02/13/2024    CAP (community acquired pneumonia) due to Chlamydia species 02/13/2024    Open wound of right foot 10/11/2023    Atherosclerotic heart disease of native coronary artery without angina pectoris 08/28/2023    Chronic obstructive pulmonary disease, unspecified (HCC) 08/28/2023    Chronic pain disorder 08/28/2023    Dependence on supplemental oxygen 08/28/2023    Difficulty in walking, not elsewhere classified 08/28/2023    Gout, unspecified 08/28/2023    Hypertensive heart disease with heart failure (HCC) 08/28/2023    Morbid (severe) obesity due to excess calories (HCC) 08/28/2023    Muscle weakness (generalized) 08/28/2023    Obstructive sleep apnea (adult) (pediatric) 08/28/2023    Other lack of coordination 08/28/2023    Other symptoms and signs involving the musculoskeletal system 08/28/2023    Personal history of COVID-19 08/28/2023    Presence of cardiac pacemaker 08/28/2023    Type 2 diabetes mellitus with diabetic peripheral angiopathy without gangrene (HCC) 08/28/2023    Unspecified cirrhosis of liver (HCC) 08/28/2023     Unspecified osteoarthritis, unspecified site 08/28/2023    Venous insufficiency (chronic) (peripheral) 08/28/2023    Acute on chronic congestive heart failure (HCC) 06/15/2023    Wound infection 06/15/2023    Venous stasis ulcer (HCC) 06/15/2023    Acute on chronic systolic CHF (congestive heart failure), NYHA class 2 (HCC) 06/14/2023    Shortness of breath 06/14/2023    Back pain 07/25/2022    Non-pressure chronic ulcer of other part of right lower leg with fat layer exposed (HCC) 06/15/2022    Ulcer of right leg, with fat layer exposed (HCC) 04/06/2022    Ankle ulcer, right, with fat layer exposed (HCC) 04/06/2022    Chronic ulcer of right heel limited to breakdown of skin ([facility]) 03/23/2022    Ulcerated, foot, left, with fat layer exposed (HCC) 03/09/2022    Chronic ulcer of left leg, with fat layer exposed (Formerly [facility]) 03/02/2022    Venous ulcer (Formerly [facility]) 06/17/2021    Cellulitis and abscess of right leg 06/02/2021    Cellulitis 06/01/2021    COVID-19 01/04/2021    Onychomycosis 11/23/2020    Hyperkeratosis 11/23/2020    Type 2 diabetes mellitus with diabetic polyneuropathy, with long-term current use of insulin (Formerly [facility]) 11/23/2020    Localized edema 09/21/2020    Idiopathic chronic venous hypertension of left lower extremity with ulcer ([facility]) 09/14/2020       DISCHARGE DIAGNOSES / PLAN:        COVID-19  Volume/fluid overload  Diabetes  COPD  Anemia  Hyperkalemia  Acute CHF  Respiratory acidosis with metabolic alkalosis  Acute kidney injury on chronic kidney disease stage III  Sleep apnea  Hypertension  Arthritis  Chronic stasis ulcers bilaterally legs  Hyperkalemia  Pneumonia with Klebsiella pneumonia        DISCHARGE MEDICATIONS:     Medication List        START taking these medications      cefTRIAXone infusion  Commonly known as: ROCEPHIN  Infuse 1,000 mg intravenously in the morning and 1,000 mg in the evening. Do all this for 10 days. Compound per protocol.     isosorbide dinitrate 20 MG tablet  Commonly known as:    ondansetron 4 MG tablet  Commonly known as: ZOFRAN     potassium chloride 20 MEQ packet  Commonly known as: KLOR-CON     sodium bicarbonate 650 MG tablet               Where to Get Your Medications        These medications were sent to [id number] - [place], [place] - [address] [place] [address]. - P [phone] - F [phone]  [address] [facility] [address]      Phone: [phone]   isosorbide dinitrate 20 MG tablet  lactobacillus capsule  propranolol 40 MG tablet       Information about where to get these medications is not yet available    Ask your nurse or doctor about these medications  cefTRIAXone infusion             NOTIFY YOUR PHYSICIAN FOR ANY OF THE FOLLOWING:   Fever over 101 degrees for 24 hours.   Chest pain, shortness of breath, fever, chills, nausea, vomiting, diarrhea, change in mentation, falling, weakness, bleeding. Severe pain or pain not relieved by medications.  Or, any other signs or symptoms that you may have questions about.    DISPOSITION:  x  Home With:   OT  PT  HH  RN       Long term SNF/Inpatient Rehab    Independent/assisted living    Hospice    Other:       PATIENT CONDITION AT DISCHARGE: Stable      PHYSICAL EXAMINATION AT DISCHARGE:  General:          Alert, cooperative, no distress, appears stated age.     HEENT:           Atraumatic, anicteric sclerae, pink conjunctivae                          No oral ulcers, mucosa moist, throat clear, dentition fair  Neck:               Supple, symmetrical  Lungs:             Clear to auscultation bilaterally.  No Wheezing or Rhonchi. No rales.  Chest wall:      No tenderness  No Accessory muscle use.  Heart:              Regular  rhythm,  No  murmur   No edema  Abdomen:        Soft, non-tender. Not distended.  Bowel sounds normal  Extremities:     No cyanosis.  No clubbing,                            Skin turgor normal, Capillary refill normal  Skin:                Not pale.  Not Jaundiced  No rashes   Psych:       mg/dL    BUN 83 (H) 6 - 20 mg/dL    Creatinine 2.18 (H) 0.70 - 1.30 mg/dL    BUN/Creatinine Ratio 38 (H) 12 - 20      Est, Glom Filt Rate 32 (L) >60 ml/min/1.73m2    Calcium 8.9 8.5 - 10.1 mg/dL    Total Bilirubin 0.4 0.2 - 1.0 mg/dL    AST 32 15 - 37 U/L    ALT 35 12 - 78 U/L    Alk Phosphatase 80 45 - 117 U/L    Total Protein 6.9 6.4 - 8.2 g/dL    Albumin 2.7 (L) 3.5 - 5.0 g/dL    Globulin 4.2 (H) 2.0 - 4.0 g/dL    Albumin/Globulin Ratio 0.6 (L) 1.1 - 2.2     Brain Natriuretic Peptide    Collection Time: 08/23/24  9:55 AM   Result Value Ref Range    NT Pro-BNP 3,041 (H) <125 pg/mL      No results found.       HOSPITAL COURSE:    Patient is a 68 y.o. year old male with past medical history of arthritis, coronary artery disease, COPD, diabetes, gout, hypertension, sleep apnea presents to the ED with dyspnea. Patient reports he was diagnosed with COVID 1 week prior, and has been increasingly short of breath since then. Also states he was taken off of his diuretic and has had increasing swelling in the legs and abdomen. He has a productive cough, states he feels like he cannot \"get it out \". No nausea or vomiting. No fevers at home.      8/16  HPI-patient alert and in moderate distress.  He endorses shortness of breath, coughing, nausea without vomiting.  He denies chest pain, palpitations, fever, headache, abdominal pain and diarrhea.     Vitals-blood pressure 127/84 pulse 98 temperature 97.5 respirations 18 O2 97%     Labs-potassium 5.4, BUN 67, creatinine 3.38, GFR 19, glucose 248, BNP 3528, ammonia 43, hemoglobin 8.9, hematocrit 28.4, MCV 95, pH 7.32, pCO2 49, D-dimer 2.97     COVID-19 detected on PCR     EKG demonstrates ventricular paced rhythm     Chest x-ray performed 8/15-cardiomegaly and mild pulmonary edema     Consulted nephrology 8/15     Consulted infectious disease 8/15 -  Check CRP, procal, LDH and ferritin  Continue Linezolid for now     Consulted cardiology 8/16-may require echocardiogram for further

## 2024-08-23 NOTE — PROGRESS NOTES
Comprehensive Nutrition Assessment    Type and Reason for Visit:  Initial, RD Nutrition Re-Screen/LOS    Nutrition Recommendations/Plan:   Continue current diet order  Nutrition education completed 8/22  RD to monitor weight, po intake, labs, GI function     Malnutrition Assessment:  Malnutrition Status:  No malnutrition (08/23/24 1327)    Context:  Acute Illness     Findings of the 6 clinical characteristics of malnutrition:  Energy Intake:  No significant decrease in energy intake  Weight Loss:  No significant weight loss     Body Fat Loss:  Unable to assess     Muscle Mass Loss:  Unable to assess    Fluid Accumulation:  Unable to assess     Strength:  Not Performed    Nutrition Assessment:    Pt assessed for LOS. Pt admitted for volume overload, COVID-19. Pt seenby RD yesterday for DM and HF education. Pt ordered a cardiac, 60g CHO/meal with good PO intakes. Meds: vit C, lipitor, b complex, tums, maxipime, ferrous sulfate, mag-ox, protonix, senokot, vit E, zincate. Labs: Creat 2.18 H, Glucose 127 H.    Nutrition Related Findings:    Last BM 8/23. PO intakes % meals. Wound Type: Multiple       Current Nutrition Intake & Therapies:    Average Meal Intake: %  Average Supplements Intake: None Ordered  ADULT DIET; Regular; 4 carb choices (60 gm/meal); Low Fat/Low Chol/High Fiber/2 gm Na; 2000 ml    Anthropometric Measures:  Height: 180.3 cm (5' 11\")  Ideal Body Weight (IBW): 172 lbs (78 kg)       Current Body Weight: 166 kg (366 lb), 212.8 % IBW. Weight Source: Bed Scale  Current BMI (kg/m2): 51.1        Weight Adjustment For: No Adjustment     BMI Categories: Obese Class 3 (BMI 40.0 or greater)  Last Weight Metrics:      8/23/2024     1:04 PM 8/23/2024     6:00 AM 8/19/2024     5:56 AM 8/18/2024     6:00 AM 8/17/2024     6:00 AM 8/16/2024    12:23 AM 8/15/2024    11:45 PM   Weight Loss Metrics   Height 5' 11\"     5' 11\"    Weight - Scale  366 lbs 6 oz 363 lbs 12 oz 363 lbs 12 oz 363 lbs 12 oz 364 lbs

## 2024-08-23 NOTE — PROGRESS NOTES
Pharmacy Note - Cefepime    Cefepime 2 gm IVPB q 12 h (30 min infusion) ordered for treatment of Skin and Soft Tissue Infection. Per [facility] Policy, Cefepime will be changed to 2 gm IVPB x 1 over 30 min to be followed by Cefepime 2 gm IVPB q 12 h (4 hr infusion) thereafter.     Estimated Creatinine Clearance: Estimated Creatinine Clearance: 51 mL/min (A) (based on SCr of 2.18 mg/dL (H)).  Dialysis Status, DEMARCO, CKD: Acute on chronic kidney disease stage III     BMI:  Body mass index is 51.1 kg/m².    Rationale for Adjustment:  [facility] B-Lactam extended infusion policy adjusted for BMI > 40 (51.1) and CrCl ~ 51 ml/min.    Pharmacy will continue to monitor and adjust dose as necessary.      Please call inpatient pharmacy with any questions.    Thank you,  ALICE RODRIGUEZ, [facility] MS

## 2024-08-23 NOTE — PROGRESS NOTES
Patient 1200 blood sugar is 179. Accu-check machine did not auto transfer data. Data verified by nurse via CSN # on machine

## 2024-08-23 NOTE — PROGRESS NOTES
Infectious Disease Progress Note           Subjective:   Remains stable, denies new complaints, no acute events since last seen. Increased drainage from right leg ulcer, greenish/tan. No acute events since last seen   Objective:   Physical Exam:     /71   Pulse 65   Temp 97.5 °F (36.4 °C) (Axillary)   Resp 18   Ht 1.803 m (5' 11\")   Wt (!) 166.2 kg (366 lb 6.5 oz)   SpO2 97%   BMI 51.10 kg/m²  O2 Flow Rate (L/min): 3 L/min (Home O2) O2 Device: Nasal cannula    Temp (24hrs), Av.5 °F (36.4 °C), Min:97.3 °F (36.3 °C), Max:97.7 °F (36.5 °C)    701 - 1900  In: 240 [P.O.:240]  Out: 300 [Urine:300]   1901 -  0700  In: 150 [P.O.:150]  Out: 3950 [Urine:3950]    General: NAD, AAO x 4  HEENT: REBEKAH, Moist mucosa   Lungs: CTA b/l, decreased at the bases, no wheeze/rhonchi   Heart: S1S2+, RRR, no murmur  Abdo: Soft, NT, ND, +BS   : no chronic juares cath   Exts: decreased b/l leg swelling, compression dressing in place   Skin: b/l leg dressings in place, not examined     Data Review:       Recent Days:    Recent Labs     24  0824  0818 24  0955   WBC 10.2 8.1 11.2*   HGB 9.7* 9.4* 10.8*   HCT 30.9* 31.0* 36.8    102* 164     Recent Labs     24  0824  0818 24  0955   BUN 97* 92* 83*   CREATININE 2.58* 2.56* 2.18*       Lab Results   Component Value Date/Time    CRP 3.22 2024 10:39 AM          Microbiology     Results       Procedure Component Value Units Date/Time    COVID-19, Rapid [[id number]]  (Abnormal) Collected: 24 1530    Order Status: Completed Specimen: Nasopharyngeal Updated: 24 1619     Source Nasopharyngeal        SARS-CoV-2, PCR DETECTED        Comment:   Positive results are indicative of the presence of SARS-CoV-2. Clinical correlation with patient history and other diagnostic information is necessary to determine patient infection status. Positive results do not rule out bacterial  ug/mL Sensitive      ceFAZolin >=64 ug/mL Resistant      cefepime <=1 ug/mL Sensitive      cefOXitin >=64 ug/mL Resistant      cefTAZidime <=1 ug/mL Sensitive      cefTRIAXone <=1 ug/mL Sensitive      ciprofloxacin <=0.25 ug/mL Sensitive      gentamicin <=1 ug/mL Sensitive      levofloxacin <=0.12 ug/mL Sensitive      tobramycin <=1 ug/mL Sensitive      trimethoprim-sulfamethoxazole <=20 ug/mL Sensitive                           COVID-19, Rapid [[id number]]  (Abnormal) Collected: 08/15/24 1523    Order Status: Completed Specimen: Nasopharyngeal Updated: 08/15/24 1700     Source Swab        SARS-CoV-2, PCR DETECTED        Comment:   Positive results are indicative of the presence of SARS-CoV-2. Clinical correlation with patient history and other diagnostic information is necessary to determine patient infection status. Positive results do not rule out bacterial infection or co-infection with other pathogens.      Testing was performed using osito Della SARS-CoV-2 assay. This test is a multiplex Real-Time Reverse Transcriptase Polymerase Chain Reaction (RT-PCR) based in vitro diagnostic test intended for the qualitative detection of nucleic acids from SARS-CoV-2 in nasopharyngeal and nasal swab specimens,for use under the FDA's Emergency Use Authorization (EUA) only.     Fact sheet for Patients: https://www.fda.gov/media/199983/download  Fact sheet for Healthcare Providers: https://www.fda.gov/media/070940/download CALLED TO AND READ BACK BY Cami [email or web address] [email or web address]         Rapid Strep Screen [[id number]] Collected: 08/12/24 2130    Order Status: Completed Specimen: Blood Serum Updated: 08/13/24 0034     Strep A Ag Negative       Culture, Throat [[id number]] Collected: 08/12/24 2130    Order Status: Completed Specimen: Throat Updated: 08/15/24 0717     Special Requests No Special Requests        Culture       Normal respiratory ady/no beta strep isolated                   Diagnostic   XR CHEST PORTABLE    Result

## 2024-08-23 NOTE — PROGRESS NOTES
Progress Note    Date:2024       Room:[facility]  Patient Name:Shantanu Casillas     YOB: 1955     Age:68 y.o.    Subjective    Subjective   Pt seen at BS. Denies any new complaints. Pending transfer to IRF. Per nursing, no acute overnight events       Review of Systems  A complete ROS was unremarkable outside of HPI       Objective         Vitals Last 24 Hours:  TEMPERATURE:  Temp  Av.5 °F (36.4 °C)  Min: 97.3 °F (36.3 °C)  Max: 97.7 °F (36.5 °C)  RESPIRATIONS RANGE: Resp  Av.9  Min: 16  Max: 18  PULSE OXIMETRY RANGE: SpO2  Av.8 %  Min: 97 %  Max: 100 %  PULSE RANGE: Pulse  Av.5  Min: 59  Max: 69  BLOOD PRESSURE RANGE: Systolic (24hrs), Av , Min:109 , Max:131   ; Diastolic (24hrs), Av, Min:65, Max:75    I/O (24Hr):    Intake/Output Summary (Last 24 hours) at 2024 1626  Last data filed at 2024 1159  Gross per 24 hour   Intake 630 ml   Output 2500 ml   Net -1870 ml     Objective  GEN: Pt is AAOx4 and in NAD. Dressings noted to B/L LE are C/D/I. No family noted at BS  DERM: Wound dorsal aspect of right first MPJ. Wound medial left leg.  VASC: Pedal pulses (DP/PT) diminished to B/L LE. CFT<3sec to all digits of B/L LE. No pedal hair growth noted to the level of the digits for B/L LE. Skin temp is warm to warm from proximal to distal for B/L LE. Neg homans/santino signs to B/L LE. No varicosities or telangectasias noted to B/L LE. Edema noted bilateral lower extremity.  NEURO: Protective and epicritic sensations grossly absent to B/L LE  MSK: (-) POP, No gross deformities. Decreased muscle tone and bulk noted to B/L LE.  PSYCH: Cooperative with normal mood and affect       Labs/Imaging/Diagnostics    Labs:  CBC:  Recent Labs     24  0821 24  0818 24  0955   WBC 10.2 8.1 11.2*   RBC 3.26* 3.18* 3.65*   HGB 9.7* 9.4* 10.8*   HCT 30.9* 31.0* 36.8   MCV 94.8 97.5 100.8*   RDW 20.6* 21.0* 21.7*    102* 164     CHEMISTRIES:  Recent Labs      08/21/24  0821 08/22/24  0818 08/23/24  0955    140 141   K 4.5 4.8 4.5    107 109*   CO2 27 26 26   BUN 97* 92* 83*   CREATININE 2.58* 2.56* 2.18*   GLUCOSE 138* 191* 127*   PHOS 4.4  --   --      PT/INR:No results for input(s): \"PROTIME\", \"INR\" in the last 72 hours.  APTT:No results for input(s): \"APTT\" in the last 72 hours.  LIVER PROFILE:  Recent Labs     08/21/24  0821 08/22/24  0818 08/23/24  0955   AST 24 28 32   ALT 26 31 35   BILITOT 0.3 0.3 0.4   ALKPHOS 76 75 80       Imaging Last 24 Hours:  XR CHEST PORTABLE    Result Date: 8/23/2024  EXAM:  XR CHEST PORTABLE INDICATION:   PNA COMPARISON: 8/20/2024 radiograph. TECHNIQUE: Portable frontal view radiograph of the chest was acquired. FINDINGS: Implantable cardiac pacemaker leads project over the right atrium, right ventricle and the coronary sinus from generator in the anterior left chest wall which partially obscures the left upper lung, similar to prior study. Lungs: No evidence of focal consolidation. Pleura: No pleural effusion or pneumothorax. Mediastinum: Heart, dueyn, mediastinum are within normal limits. Upper abdomen/soft tissue: Unremarkable. MSK: No acute osseous abnormalities.     No acute cardiopulmonary process. No focal lung opacity. Electronically signed by CALEB SANABRIA      Assessment//Plan           Hospital Problems             Last Modified POA    * (Principal) Volume overload 8/15/2024 Yes    Fluid overload 8/15/2024 Yes    Acute and chronic respiratory failure with hypoxia (HCC) 8/20/2024 Yes     Assessment & Plan    Diabetic foot ulcer right  Venous ulcer left leg  Venous insufficiency  Edema bilateral lower extremity  Chronic tophaceous gout  DEMARCO        Patient seen and evaluated at bedside  - Current labs personally reviewed and findings dicussed with patient  - Recommend continuing local wound care at this time.  Wound care orders placed in chart.  - Discussed with patient to elevate bilateral lower extremity to reduce

## 2024-08-23 NOTE — PLAN OF CARE
PHYSICAL THERAPY TREATMENT     Patient: Shantanu Casillas (68 y.o. male)  Date: 8/23/2024  Diagnosis: DEMARCO (acute kidney injury) (HCC) [N17.9]  Fluid overload [E87.70]  Volume overload [E87.70]  Hypervolemia, unspecified hypervolemia type [E87.70]  COVID-19 [U07.1] Volume overload      Precautions: Fall Risk, Isolation (Airborne, droplet precautions)                      Recommendations for nursing mobility: Out of bed to chair for meals, Encourage HEP in prep for ADLs/mobility; see handout for details, Frequent repositioning to prevent skin breakdown, AD and gt belt for bed to chair , Amb to bathroom with AD and gait belt, and Assist x1    In place during session: Peripheral IV, Nasal Cannula 3L, and External Catheter  Chart, physical therapy assessment, plan of care and goals were reviewed.  ASSESSMENT  Patient continues with skilled PT services and is progressing towards goals. Pt sitting up in the upon PT arrival, agreeable to session. Pt A&O x 4. (See below for objective details and assist levels).     Overall pt tolerated session well today.  Patient demos improved mobility today and was able to complete sit<>stand transfers with close SBA and amb with CGA. Pt amb with RW approx 20 ft to the bathroom then another 5 ft back to bed. Steady gait with no LOB but cont to use forearms at times on RW and educated on safe techniques and hand placement with RW. Patient does amb with flexed posture but maintains balance well during gait. Balance also maintained well while PTA assisted with posterior pericare in the bathroom. Tolerated seated therex well at EOB. Will continue to benefit from skilled PT services, and will continue to progress as tolerated. Potential barriers for safe discharge: pt is not safe to be alone and concern for pt safely navigating or managing the home environment. Current PT DC recommendation Inpatient Rehabilitation Facility  once medically appropriate.     Start of Session End of  Session   SPO2 (%) 99 97   Heart Rate (BPM)  74     GOALS:    Problem: Physical Therapy - Adult  Goal: By Discharge: Performs mobility at highest level of function for planned discharge setting.  See evaluation for individualized goals.  Description: FUNCTIONAL STATUS PRIOR TO ADMISSION: Patient was modified independent using a rollator for functional mobility.    HOME SUPPORT PRIOR TO ADMISSION: The patient lived with wife and daughter but did not require assistance for mobility.    Physical Therapy Goals  Initiated 8/18/2024  Pt stated goal: \"Get my strength back\"  Pt will be I with LE HEP in 7 days.  Pt will perform bed mobility with [place] in 7 days.  Pt will perform transfers with Modified [place] in 7 days.   Pt will amb 50 feet with LRAD safely with Supervision in 7 days.  Pt will demonstrate improvement in static standing balance from Contact Guard Assist to [place] in 7 days.     Outcome: Progressing          PLAN :  Patient continues to benefit from skilled intervention to address functional impairments. Continue treatment per established plan of care to address goals.    Recommendation for discharge: (in order for the patient to meet his/her long term goals)  Inpatient Rehabilitation Facility     IF patient discharges home will need the following DME:continuing to assess with progress     SUBJECTIVE:   Patient stated “”    OBJECTIVE DATA SUMMARY:   Critical Behavior:  Orientation  Overall Orientation Status: Within Normal Limits  Orientation Level: Oriented X4  Cognition  Overall Cognitive Status: WNL    Functional Mobility Training:  Bed Mobility:  Bed Mobility Training  Sit to Supine: Stand-by assistance  Scooting: Stand-by assistance  Transfers:  Transfer Training  Sit to Stand: Stand-by assistance  Stand to Sit: Stand-by assistance  Toilet Transfer: Stand-by assistance  Balance:  Balance  Sitting: Intact  Standing: Impaired  Standing - Static: Good  Standing - Dynamic: Constant

## 2024-08-23 NOTE — PROGRESS NOTES
ROBINA Diaz CNP        0.9 % sodium chloride infusion   IntraVENous PRN Kitty Phelps APRN - CNP        potassium chloride (KLOR-CON M) extended release tablet 40 mEq  40 mEq Oral PRN Kitty Phelps APRN - CNP        Or    potassium bicarb-citric acid (EFFER-K) effervescent tablet 40 mEq  40 mEq Oral PRN Kitty Phelps APRN - CNP        Or    potassium chloride 10 mEq/100 mL IVPB (Peripheral Line)  10 mEq IntraVENous PRN Kitty Phelps APRN - CNP        magnesium sulfate 2000 mg in 50 mL IVPB premix  2,000 mg IntraVENous PRN Kitty Phelps APRN - CNP        ondansetron (ZOFRAN-ODT) disintegrating tablet 4 mg  4 mg Oral Q8H PRN Kitty Phelps APRN - CNP        Or    ondansetron (ZOFRAN) injection 4 mg  4 mg IntraVENous Q6H PRN Kitty Phelps APRN - CNP        polyethylene glycol (GLYCOLAX) packet 17 g  17 g Oral Daily PRN Kitty Phelps APRN - CNP        acetaminophen (TYLENOL) tablet 650 mg  650 mg Oral Q6H PRN Kitty Phelps APRN - CNP   650 mg at 08/22/24 2114    Or    acetaminophen (TYLENOL) suppository 650 mg  650 mg Rectal Q6H PRN Kitty Phelps APRN - CNP        albuterol sulfate HFA (PROVENTIL;VENTOLIN;PROAIR) 108 (90 Base) MCG/ACT inhaler 1 puff  1 puff Inhalation Q4H PRN Kitty Phelps APRN - CNP        ascorbic acid (VITAMIN C) tablet 1,000 mg  1,000 mg Oral BID Kitty Phelps APRN - CNP   1,000 mg at 08/23/24 0837    aspirin chewable tablet 81 mg  81 mg Oral Daily Kitty Phelps APRN - CNP   81 mg at 08/23/24 0836    atorvastatin (LIPITOR) tablet 40 mg  40 mg Oral Nightly Kitty Phelps APRN - CNP   40 mg at 08/22/24 2118    b complex vitamins capsule 1 capsule  1 capsule Oral Daily Kitty Phelps APRN - CNP   1 capsule at 08/23/24 0836    Benzocaine-Menthol (CEPACOL) 1 lozenge  1 lozenge Oral Q2H Kitty Lux APRN - CNP   1 lozenge at 08/22/24 2217    calcium carbonate (TUMS) chewable tablet 500 mg  1 tablet Oral BID King  simethicone (MYLICON) chewable tablet 80 mg  80 mg Oral Q6H PRN Jayde Phelpsleandro STAUFFER, APRN - CNP        Sodium Hypochlorite (DAKINS) 0.25 % half strength external soln   Irrigation Daily LeninJaydeKitty M, APRN - CNP   Given at 08/23/24 0843    sorbitol 70 % liquid 30 mL  30 mL Oral Daily PRN Lenin Kitty STAUFFER, APRN - CNP        tamsulosin (FLOMAX) capsule 0.8 mg  0.8 mg Oral Daily Jayde Phelpsleandro STAUFFER, APRN - CNP   0.8 mg at 08/23/24 0837    vitamin E capsule 400 Units  400 Units Oral BID Kitty Phelps, APRN - CNP   400 Units at 08/23/24 0837    zinc sulfate (ZINCATE) 220 mg capsule - elemental zinc 50 mg  50 mg Oral Daily Kitty Phelps XIOMY, APRN - CNP   50 mg at 08/23/24 0837    insulin glargine (LANTUS) injection vial 32 Units  32 Units SubCUTAneous Nightly Jayde Phelpsleandro STAUFFER APRN - CNP   32 Units at 08/22/24 2123    insulin glargine (LANTUS) injection vial 80 Units  80 Units SubCUTAneous Daily Kitty Phelps XIOMY, APRN - CNP   80 Units at 08/22/24 0912    insulin lispro (HUMALOG,ADMELOG) injection vial 0-16 Units  0-16 Units SubCUTAneous TID WC Jayde Phelpsleandro STAUFFER, APRN - CNP   4 Units at 08/22/24 1311    insulin lispro (HUMALOG,ADMELOG) injection vial 0-4 Units  0-4 Units SubCUTAneous Nightly Kitty Phelps, APRN - CNP   4 Units at 08/21/24 2148    azelastine (ASTELIN) 0.1 % nasal spray 2 spray  2 spray Each Nostril BID Ed Pino MD            ARTERIAL BLOOD GAS  No results for input(s): \"PHART\", \"XHR6UVL\", \"PO2ART\", \"ZSZ5CIP\", \"BEART\", \"ULG2NMVX\", \"HGBART\", \"FG3OMJTCT\", \"FIO2A\", \"U7NRIOWI\", \"OXYHEM\", \"CARBOXHGBART\", \"METHGBART\", \"V0PBJIPSW\", \"PHCORART\", \"TEMP\" in the last 72 hours.    Invalid input(s): \"MUT4QRUKD\"  Labs:    Recent Labs     08/20/24  1039 08/21/24  0821 08/22/24  0818   WBC 10.1 10.2 8.1   HGB 9.9* 9.7* 9.4*    196 102*     Recent Labs     08/21/24  0821 08/22/24  0818    140   K 4.5 4.8    107   CO2 27 26   GLUCOSE 138* 191*   BUN 97* 92*   CREATININE 2.58* 2.56*    CALCIUM 8.8 8.7   PHOS 4.4  --    BILITOT 0.3 0.3   AST 24 28   ALT 26 31     No results for input(s): \"CKTOTAL\", \"CKMB\", \"CKMBINDEX\", \"TROPONINI\" in the last 72 hours.  Lab Results   Component Value Date/Time    PROBNP 6,482 08/19/2024 12:32 AM     04/19/2023 04:41 PM      No results found for: \"TSH\"   Results       Procedure Component Value Units Date/Time    COVID-19, Rapid [[id number]]  (Abnormal) Collected: 08/21/24 1530    Order Status: Completed Specimen: Nasopharyngeal Updated: 08/21/24 1619     Source Nasopharyngeal        SARS-CoV-2, PCR DETECTED        Comment:   Positive results are indicative of the presence of SARS-CoV-2. Clinical correlation with patient history and other diagnostic information is necessary to determine patient infection status. Positive results do not rule out bacterial infection or co-infection with other pathogens.      Testing was performed using osito Della SARS-CoV-2 assay. This test is a multiplex Real-Time Reverse Transcriptase Polymerase Chain Reaction (RT-PCR) based in vitro diagnostic test intended for the qualitative detection of nucleic acids from SARS-CoV-2 in nasopharyngeal and nasal swab specimens,for use under the FDA's Emergency Use Authorization (EUA) only.     Fact sheet for Patients: https://www.fda.gov/media/141535/download  Fact sheet for Healthcare Providers: https://www.fda.gov/media/837907/download CALLED TO AND READ BACK BY RHEA QUIJANO AT 1618 CW         Culture, Respiratory [[id number]]  (Abnormal)  (Susceptibility) Collected: 08/17/24 1400    Order Status: Completed Specimen: Sputum Updated: 08/19/24 1342     Special Requests No Special Requests        Gram Stain       Rare Epithelial cells seen            1+ WBCs seen         3+ Gram Negative Rods               Occasional Gram Positive Cocci in pairs           Culture       Heavy Klebsiella pneumoniae                  Moderate Enterobacter cloacae complex                  No normal respiratory  the qualitative detection of nucleic acids from SARS-CoV-2 in nasopharyngeal and nasal swab specimens,for use under the FDA's Emergency Use Authorization (EUA) only.     Fact sheet for Patients: https://www.fda.gov/media/315678/download  Fact sheet for Healthcare Providers: https://www.fda.gov/media/981217/download CALLED TO AND READ BACK BY Cami [email or web address] [email or web address]         Rapid Strep Screen [[id number]] Collected: 08/12/24 2130    Order Status: Completed Specimen: Blood Serum Updated: 08/13/24 0034     Strep A Ag Negative       Culture, Throat [[id number]] Collected: 08/12/24 2130    Order Status: Completed Specimen: Throat Updated: 08/15/24 0717     Special Requests No Special Requests        Culture       Normal respiratory ady/no beta strep isolated                 Imaging:  XR CHEST PORTABLE   Final Result      Resolved pulmonary edema.         Electronically signed by Madeline Barrett      XR CHEST PORTABLE   Final Result   Cardiomegaly and mild pulmonary edema.      Electronically signed by Nasir Sebastian      XR CHEST PORTABLE    (Results Pending)         8/20 patient doing better in isolation because of COVID-19 also patient has history of MRSA on Decadron Lasix is on hold chronic leg edema with venous stasis continue trilogy noninvasive ventilator at night and daytime as needed  8/21 uses trilogy machine last night nasal cannula leg swelling improved continue with antibiotics patient has UTI with VRE completed antibiotic linezolid also has CHF fluid overload low EF getting treatment for Klebsiella pneumonia  8/22 Sleepy this a.m. using his trilogy noninvasive ventilator, nasal cannula during the daytime patient is getting treatment for Klebsiella pneumonia  8/23 alert awake sitting in bed use his trilogy noninvasive ventilator last night on Rocephin for Klebsiella pneumonia patient was treated for COVID-19 pneumonia 2 weeks ago most likely patient still have the COVID-19 protein which came back positive

## 2024-08-23 NOTE — PLAN OF CARE
Problem: Discharge Planning  Goal: Discharge to home or other facility with appropriate resources  Outcome: Progressing  Flowsheets (Taken 8/22/2024 0900 by Sonia Yuen, RN)  Discharge to home or other facility with appropriate resources: Identify barriers to discharge with patient and caregiver     Problem: Skin/Tissue Integrity  Goal: Absence of new skin breakdown  Description: 1.  Monitor for areas of redness and/or skin breakdown  2.  Assess vascular access sites hourly  3.  Every 4-6 hours minimum:  Change oxygen saturation probe site  4.  Every 4-6 hours:  If on nasal continuous positive airway pressure, respiratory therapy assess nares and determine need for appliance change or resting period.  Outcome: Progressing     Problem: ABCDS Injury Assessment  Goal: Absence of physical injury  Outcome: Progressing     Problem: Safety - Adult  Goal: Free from fall injury  Outcome: Progressing     Problem: Respiratory - Adult  Goal: Achieves optimal ventilation and oxygenation  Outcome: Progressing     Problem: Cardiovascular - Adult  Goal: Maintains optimal cardiac output and hemodynamic stability  Outcome: Progressing  Goal: Absence of cardiac dysrhythmias or at baseline  Outcome: Progressing     Problem: Skin/Tissue Integrity - Adult  Goal: Incisions, wounds, or drain sites healing without S/S of infection  Outcome: Progressing     Problem: Infection - Adult  Goal: Absence of infection at discharge  Outcome: Progressing  Flowsheets (Taken 8/22/2024 0900 by Sonia Yuen, RN)  Absence of infection at discharge: Assess and monitor for signs and symptoms of infection  Goal: Absence of infection during hospitalization  Outcome: Progressing  Goal: Absence of fever/infection during anticipated neutropenic period  Outcome: Progressing     Problem: Metabolic/Fluid and Electrolytes - Adult  Goal: Electrolytes maintained within normal limits  Outcome: Progressing  Flowsheets (Taken 8/22/2024 0900 by Sonia Yuen  goal: I want to be able to use my hands better  1.  Patient will perform self-feeding with Modified [place] with foam tubing on utensil handle and Additional Time within 7 day(s).  2.  Patient will perform lower body dressing with Moderate Assist, using a reacher for pants while sitting on EOB and Additional Time within 7 day(s).  3.  Patient will perform upper body bathing sitting on the EOB with Minimal Assist, Additional Time, and Assist x1 within 7 day(s).  4.  Patient will perform all aspects of toileting with Moderate Assist, Additional Time, and Assist x1 within 7 day(s).  5.  Patient will participate in upper extremity therapeutic exercise/activities with Modified [place] and Additional Time for 10 minutes within 7 day(s).    6.  Patient will utilize energy conservation techniques during functional activities with no verbal cues within 7 day(s).  8/22/2024 1605 by Nate Rodirguez OTA  Outcome: Progressing

## 2024-08-23 NOTE — CARE COORDINATION
0715: Chart reviewed.     Per notes; patient on IV ABX followed via cardiology, dietitian, ID, nephrology, neurology, podiatry and pulmonology.     PT/OT recs for IRF noted.     [facility] has accepted patient when cleared for discharge.     Patient interested in going to [facility] per nursing note 08/21/2024 and conversation with CM 08/22/2024.     [facility] referral pending.    Updates attached in [facility].     Per  note, patient does not have any skilled nursing days left.     CM will continue to follow patient and recs of medical team.    0815: [facility] declined referral.    1009: Notified [facility] anticipate discharge today.    1134: [facility] notified discharge orders are in.    1152: Per [facility], beds are full.    1225: CM met with patient sitting up in recliner eating to confirm understanding of discharge orders pending bed availability at [facility].    Understanding verbalized.

## 2024-08-23 NOTE — PROGRESS NOTES
Nephrology follow up          Patient: Shantanu Casillas MRN: [id number]  SSN: [id number]    YOB: 1955  Age: 68 y.o.  Sex: male      Subjective:   The pt is seen in the room.  Blood pressures are good  Afebrile  On nasal cannula 3 L  Resolving lower extremity swelling  resolving DEMARCO  Past Medical History:   Diagnosis Date    Arthritis     CAD (coronary artery disease)     Chronic obstructive pulmonary disease (HCC)     Diabetes (HCC)     Gout     Hypertension     Ill-defined condition     Sleep apnea     cpap     Past Surgical History:   Procedure Laterality Date    FOOT DEBRIDEMENT Right 12/15/2023    INCISION AND DRAINAGE RIGHT FIRST METATARSAL PHALANGEAL JOINT performed by Polo Oviedo DPM at [facility] MAIN OR    FOOT SURGERY      right heel - foriegn object    HX VEIN ABLATION ADHESIVE      ORTHOPEDIC SURGERY      back surgery    PACEMAKER      aicd -      Family History   Problem Relation Age of Onset    Diabetes Mother     Heart Disease Father     Hypertension Father     Diabetes Sister     Diabetes Brother     Hypertension Mother     Heart Disease Mother     Kidney Disease Mother      Social History     Tobacco Use    Smoking status: Never    Smokeless tobacco: Never   Substance Use Topics    Alcohol use: Not Currently      Current Facility-Administered Medications   Medication Dose Route Frequency Provider Last Rate Last Admin    [START ON 8/24/2024] ceFEPIme (MAXIPIME) 2,000 mg in sodium chloride 0.9 % 100 mL IVPB (mini-bag)  2,000 mg IntraVENous Q12H Sergei Kolb MD        loperamide (IMODIUM) capsule 2 mg  2 mg Oral 4x Daily PRN Sergei Kolb MD   2 mg at 08/21/24 1732    lactobacillus (CULTURELLE) capsule 1 capsule  1 capsule Oral Daily with breakfast Ed Pino MD   1 capsule at 08/23/24 0836    propranolol (INDERAL) tablet 40 mg  40 mg Oral BID Yue Botello MD   40 mg at 08/23/24 0836    clotrimazole (MYCELEX) matthias 10 mg  10 mg Oral TID Alvarez  diastolic dysfunction  LVEF 40 to 45%  SOB and increasing swelling of extremities  Chest x-ray mild pulmonary edema  Received aggressive IV diuresis  Holding due to DEMARCO    Metabolic alkalosis  Due to renal compensation in the setting of chronic respiratory acidosis  CO2 26  discontinued oral sodium bicarbonate.    Hypertension  Good blood pressure.  On no Bp meds.    Anemia  Anemia of chronic kidney disease  Iron deficiency  Hemoglobin 8.5->9.7.  Subcu erythropoietin weekly  Continue oral iron sulfate        Plan:       Signed By: Bhupendra Hayden MD     August 23, 2024

## 2024-08-23 NOTE — PROGRESS NOTES
OCCUPATIONAL THERAPY TREATMENT  Patient: Shantanu Casillas (68 y.o. male)  Date: 8/23/2024  Primary Diagnosis: DEMARCO (acute kidney injury) (HCC) [N17.9]  Fluid overload [E87.70]  Volume overload [E87.70]  Hypervolemia, unspecified hypervolemia type [E87.70]  COVID-19 [U07.1]       Precautions: Fall Risk, Isolation (Airborne, droplet precautions)                Recommendations for nursing mobility: Out of bed to chair for meals, Encourage HEP in prep for ADLs/mobility; see handout for details, Frequent repositioning to prevent skin breakdown, AD and gt belt for bed to chair , Amb to bathroom with AD and gait belt, and Assist x1    In place during session: Nasal Cannula 3L and External Catheter  Chart, occupational therapy assessment, plan of care, and goals were reviewed.  ASSESSMENT  Patient continues with skilled OT services and is progressing towards goals. Pt presented sitting in recliner upon EDEN arrival, agreeable to session. Pt A&O x 4. Pt participated in UE therex to increase strength and endurance to aid in ADL performance. Pt requires verbal, demonstration, and visual cues. Pt completed with red theraband six different exercises.  Pt required rest breaks between each exercise and verbal cues for breathing technique, proper techniques to work each muscle and prevent injury. Pt demonstrated understanding. Pt was left in the recliner with all needs met. (See below for objective details and assist levels).     Overall pt tolerated session fair today with frequent rest breaks.  Potential barriers for safe discharge: pts current support system is unable to meet their requirements for physical assistance, pt is a high fall risk, pt is not safe to be alone, and concern for pt safely navigating or managing the home environment. Current OT recommendations for discharge Inpatient Rehabilitation Facility . Will continue to benefit from skilled OT services, and will continue to progress as tolerated.      Start of  Session End of Session   SPO2 (%) WNL WNL   Heart Rate (BPM) WNL WNL     GOALS:    Problem: Occupational Therapy - Adult  Goal: By Discharge: Performs self-care activities at highest level of function for planned discharge setting.  See evaluation for individualized goals.  Description: FUNCTIONAL STATUS PRIOR TO ADMISSION:  Patient was ambulatory using RW as needed for mobility and able to actively assist with Grooming, Bathing, Dressing, and Toileting.    HOME SUPPORT: The patient lived with spouse.    Occupational Therapy Goals:  Initiated 8/19/2024  Patient/Family stated goal: I want to be able to use my hands better  1.  Patient will perform self-feeding with Modified [place] with foam tubing on utensil handle and Additional Time within 7 day(s).  2.  Patient will perform lower body dressing with Moderate Assist, using a reacher for pants while sitting on EOB and Additional Time within 7 day(s).  3.  Patient will perform upper body bathing sitting on the EOB with Minimal Assist, Additional Time, and Assist x1 within 7 day(s).  4.  Patient will perform all aspects of toileting with Moderate Assist, Additional Time, and Assist x1 within 7 day(s).  5.  Patient will participate in upper extremity therapeutic exercise/activities with Modified [place] and Additional Time for 10 minutes within 7 day(s).    6.  Patient will utilize energy conservation techniques during functional activities with no verbal cues within 7 day(s).  Outcome: Progressing        PLAN :  Patient continues to benefit from skilled intervention to address functional impairments. Continue treatment per established plan of care to address goals.    Recommend next OT session: standing grooming    Recommendation for discharge: (in order for the patient to meet his/her long term goals): Inpatient Rehabilitation Facility     IF patient discharges home will need the following DME: continuing to assess with progress     SUBJECTIVE:   Patient  agreeable to therapy      OBJECTIVE DATA SUMMARY:   Cognitive/Behavioral Status:  Orientation  Orientation Level: Oriented X4  Cognition  Overall Cognitive Status: WNL    Therapeutic exercise:  Completed with red theraband  Exercise Sets Reps AROM AAROM PROM Self PROM Comments   Shoulder flex/ext 1 10 [x] [] [] []    Bicep curls 1 10 [x] [] [] []    Chest pulls 1 10 [x] [] [] []    Abduction/adduction 1 10 [x] [] [] []    Shoulder elevation 1 10 X       Shoulder protraction/retraction 1 10 X         Pain Ratin/10   Pain Intervention(s):   pain is at a level acceptable to the patient    Activity Tolerance:   Fair  and requires frequent rest breaks    After treatment patient left in no apparent distress:   Bed locked and returned to lowest position, Patient left in no apparent distress sitting up in chair, Call bell within reach, and Updated patient's board on functional status and mobility recommendations, and nsg updated     COMMUNICATION/EDUCATION:   The patient’s plan of care was discussed with: Registered nurse    Patient Education  Education Given To: Patient  Education Provided: Plan of Care;Home Exercise Program;Energy Conservation  Education Method: Demonstration;Verbal;Teach Back  Barriers to Learning: None  Education Outcome: Demonstrated understanding;Continued education needed    Thank you for this referral.  ODALIS Escudero  Minutes: 38

## 2024-08-23 NOTE — DISCHARGE INSTRUCTIONS
Discharge Instructions       PATIENT ID: Shantanu Casillas  MRN: [id number]   YOB: 1955    DATE OF ADMISSION: 8/15/2024  DATE OF DISCHARGE: 8/23/2024    PRIMARY CARE PROVIDER: [unfilled]     ATTENDING PHYSICIAN: Ed Pino MD   DISCHARGING PROVIDER: Ed Pino MD    To contact this individual call [phone] and ask the  to page.   If unavailable, ask to be transferred the Adult Hospitalist Department.    DISCHARGE DIAGNOSES acute kidney injury    CONSULTATIONS: [unfilled]      PROCEDURES/SURGERIES: * No surgery found *    PENDING TEST RESULTS:   At the time of discharge the following test results are still pending: None    FOLLOW UP APPOINTMENTS:   Ed Pino MD  [address] [facility]   [facility] [address] [phone]    Schedule an appointment as soon as possible for a visit in 1 week(s)         ADDITIONAL CARE RECOMMENDATIONS: Monitor renal function closely/follow-up with nephrology at this rehab    DIET: Resume previous diet      ACTIVITY: Activity as tolerated    Wound care: Wound Care Order:  submitted to Case Management.  Please view [email or web address]     EQUIPMENT needed: ***      DISCHARGE MEDICATIONS:   See Medication Reconciliation Form    It is important that you take the medication exactly as they are prescribed.   Keep your medication in the bottles provided by the pharmacist and keep a list of the medication names, dosages, and times to be taken in your wallet.   Do not take other medications without consulting your doctor.       NOTIFY YOUR PHYSICIAN FOR ANY OF THE FOLLOWING:   Fever over 101 degrees for 24 hours.   Chest pain, shortness of breath, fever, chills, nausea, vomiting, diarrhea, change in mentation, falling, weakness, bleeding. Severe pain or pain not relieved by medications.  Or, any other signs or symptoms that you may have questions about.      DISPOSITION:    Home With:   OT  PT  HH  RN       SNF/Inpatient Rehab/LTAC

## 2024-08-23 NOTE — PROGRESS NOTES
Progress Note      8/23/2024 4:19 PM  NAME: Shantanu Casillas   MRN:  [id number]   Admit Diagnosis: DEMARCO (acute kidney injury) (HCC) [N17.9]  Fluid overload [E87.70]  Volume overload [E87.70]  Hypervolemia, unspecified hypervolemia type [E87.70]  COVID-19 [U07.1]      Problem List:   -Admitted to the hospital with shortness of breath  -COVID-19 infection.  -COPD  -History of coronary artery disease details unavailable at this time  -History of congestive heart failure with last known ejection fraction of 40 to 45% as of 2023  -Sleep apnea  -Hypertension  -Diabetes mellitus  -Arthritis         Assessment/Plan:   Note dictated August 23, 2024  -Routine follow-up visit after a few days S patient is still in the hospital.  -When I saw him he was sitting up in the college and denies any symptoms of chest pain shortness of breath or any other anginal equivalent and he stated that he is waiting for a room in a rehabilitation.  -Will continue to optimize guide and elected medical management for cardiomyopathy and increase dose as tolerated by hemodynamics.  -This has been discussed with the patient and significant other present in the room that he needs to follow-up with us as an outpatient after he finishes rehabilitation in 6-8 weeks or earlier when necessary.  ==============================================================    Note dictated August 20, 2024  -Patient is lying comfortably in the bed.  -COVID-19 testing is pending.  -Once results are available we will obtain an echocardiogram for the evaluation of cardiomyopathy and manage him appropriately based on guidelines recommendation.  -Detail examination deferred on today's visit.  -We will continue to follow as discussed above and optimize guideline directed medical management for cardiomyopathy as clinically indicated.  ===============================================================  Note dictated August 19, 2024  -Patient lying comfortably in the bed.  No  dressing and antibiotics.  Telemetry monitoring reveals sinus rhythm.  Will check serial cardiac enzymes once okay to proceed with echocardiogram from ID as patient is clinically stable for me to expose other team members unnecessarily as patient is clinically stable per my cardiac assessment.  Continue to optimize guideline directed medical management for cardiomyopathy and increase doses as tolerated per hemodynamics     Review of Systems:   Negative except for as noted above.    Objective:      Physical Exam:    Last 24hrs VS reviewed since prior progress note. Most recent are:    /71   Pulse 65   Temp 97.5 °F (36.4 °C) (Axillary)   Resp 16   Ht 1.803 m (5' 11\")   Wt (!) 166.2 kg (366 lb 6.5 oz)   SpO2 97%   BMI 51.10 kg/m²     Intake/Output Summary (Last 24 hours) at 8/23/2024 1619  Last data filed at 8/23/2024 1159  Gross per 24 hour   Intake 630 ml   Output 2500 ml   Net -1870 ml        General Appearance: Alert; no acute distress.  Ears/Nose/Mouth/Throat: moist mucous membranes  Neck: Supple.  Chest: Lungs clear to auscultation bilaterally.  Cardiovascular: Regular rate and rhythm, S1S2 normal  Abdomen: Soft, non-tender, bowel sounds are active.  Extremities: No edema bilaterally.  Skin: Warm and dry.      PMH/SH reviewed - no change compared to H&P    Telemetry: Sinus rhythm    EKG:     No valid procedures specified.    [x]  No new EKG for review    Lab Data Personally Reviewed:    Recent Labs     08/22/24  0818 08/23/24  0955   WBC 8.1 11.2*   HGB 9.4* 10.8*   HCT 31.0* 36.8   * 164     No results for input(s): \"INR\", \"APTT\" in the last 72 hours.    Invalid input(s): \"PTP\"   Recent Labs     08/21/24  0821 08/22/24  0818 08/23/24  0955    140 141   K 4.5 4.8 4.5    107 109*   CO2 27 26 26   BUN 97* 92* 83*     No results for input(s): \"CPK\" in the last 72 hours.    Invalid input(s): \"CPKMB\", \"CKNDX\", \"TROIQ\"  No results found for: \"CHOL\", \"CHLST\", \"CHOLV\", \"HDL\", \"HDLC\",  albuterol sulfate HFA (PROVENTIL;VENTOLIN;PROAIR) 108 (90 Base) MCG/ACT inhaler 1 puff  1 puff Inhalation Q4H PRN    ascorbic acid (VITAMIN C) tablet 1,000 mg  1,000 mg Oral BID    aspirin chewable tablet 81 mg  81 mg Oral Daily    atorvastatin (LIPITOR) tablet 40 mg  40 mg Oral Nightly    b complex vitamins capsule 1 capsule  1 capsule Oral Daily    Benzocaine-Menthol (CEPACOL) 1 lozenge  1 lozenge Oral Q2H PRN    calcium carbonate (TUMS) chewable tablet 500 mg  1 tablet Oral BID    cetirizine (ZYRTEC) tablet 10 mg  10 mg Oral Nightly    clonazePAM (KLONOPIN) tablet 0.25 mg  0.25 mg Oral BID PRN    ferrous sulfate (IRON 325) tablet 325 mg  325 mg Oral Daily with breakfast    fluticasone (FLONASE) 50 MCG/ACT nasal spray 2 spray  2 spray Each Nostril Daily    budesonide-formoterol (SYMBICORT) 80-4.5 MCG/ACT inhaler 2 puff  2 puff Inhalation BID RT    And    tiotropium (SPIRIVA RESPIMAT) 2.5 MCG/ACT inhaler 2 puff  2 puff Inhalation Daily RT    guaiFENesin (ROBITUSSIN) 100 MG/5ML liquid 200 mg  200 mg Oral 4x Daily PRN    hydrALAZINE (APRESOLINE) tablet 25 mg  25 mg Oral Q6H PRN    hydrOXYzine HCl (ATARAX) tablet 10 mg  10 mg Oral Q6H PRN    ipratropium 0.5 mg-albuterol 2.5 mg (DUONEB) nebulizer solution 1 Dose  1 Dose Inhalation Q4H PRN    lidocaine 4 % external patch 1 patch  1 patch TransDERmal Daily    magnesium oxide (MAG-OX) tablet 400 mg  400 mg Oral Daily    melatonin tablet 3 mg  3 mg Oral Nightly    Virt-Caps 1 mg  1 capsule Oral QAM    pantoprazole (PROTONIX) tablet 40 mg  40 mg Oral QAM AC    senna (SENOKOT) tablet 8.6 mg  1 tablet Oral Daily    simethicone (MYLICON) chewable tablet 80 mg  80 mg Oral Q6H PRN    Sodium Hypochlorite (DAKINS) 0.25 % half strength external soln   Irrigation Daily    sorbitol 70 % liquid 30 mL  30 mL Oral Daily PRN    tamsulosin (FLOMAX) capsule 0.8 mg  0.8 mg Oral Daily    vitamin E capsule 400 Units  400 Units Oral BID    zinc sulfate (ZINCATE) 220 mg capsule - elemental zinc

## 2024-08-24 VITALS
WEIGHT: 315 LBS | RESPIRATION RATE: 16 BRPM | OXYGEN SATURATION: 96 % | TEMPERATURE: 97.5 F | DIASTOLIC BLOOD PRESSURE: 67 MMHG | HEART RATE: 70 BPM | BODY MASS INDEX: 44.1 KG/M2 | SYSTOLIC BLOOD PRESSURE: 115 MMHG | HEIGHT: 71 IN

## 2024-08-24 LAB
ALBUMIN SERPL-MCNC: 2.6 G/DL (ref 3.5–5)
ANION GAP SERPL CALC-SCNC: 7 MMOL/L (ref 5–15)
BUN SERPL-MCNC: 73 MG/DL (ref 6–20)
BUN/CREAT SERPL: 37 (ref 12–20)
CA-I BLD-MCNC: 8.2 MG/DL (ref 8.5–10.1)
CHLORIDE SERPL-SCNC: 111 MMOL/L (ref 97–108)
CO2 SERPL-SCNC: 25 MMOL/L (ref 21–32)
CREAT SERPL-MCNC: 1.95 MG/DL (ref 0.7–1.3)
GLUCOSE BLD STRIP.AUTO-MCNC: 122 MG/DL (ref 65–100)
GLUCOSE BLD STRIP.AUTO-MCNC: 54 MG/DL (ref 65–100)
GLUCOSE BLD STRIP.AUTO-MCNC: 69 MG/DL (ref 65–100)
GLUCOSE BLD STRIP.AUTO-MCNC: 81 MG/DL (ref 65–100)
GLUCOSE SERPL-MCNC: 104 MG/DL (ref 65–100)
PERFORMED BY:: ABNORMAL
PERFORMED BY:: ABNORMAL
PERFORMED BY:: NORMAL
PERFORMED BY:: NORMAL
PHOSPHATE SERPL-MCNC: 4.3 MG/DL (ref 2.6–4.7)
POTASSIUM SERPL-SCNC: 4.4 MMOL/L (ref 3.5–5.1)
SODIUM SERPL-SCNC: 143 MMOL/L (ref 136–145)

## 2024-08-24 PROCEDURE — 6370000000 HC RX 637 (ALT 250 FOR IP): Performed by: INTERNAL MEDICINE

## 2024-08-24 PROCEDURE — 36415 COLL VENOUS BLD VENIPUNCTURE: CPT

## 2024-08-24 PROCEDURE — 82962 GLUCOSE BLOOD TEST: CPT

## 2024-08-24 PROCEDURE — 2580000003 HC RX 258: Performed by: INTERNAL MEDICINE

## 2024-08-24 PROCEDURE — 2700000000 HC OXYGEN THERAPY PER DAY

## 2024-08-24 PROCEDURE — 2580000003 HC RX 258: Performed by: NURSE PRACTITIONER

## 2024-08-24 PROCEDURE — 6360000002 HC RX W HCPCS: Performed by: INTERNAL MEDICINE

## 2024-08-24 PROCEDURE — 6370000000 HC RX 637 (ALT 250 FOR IP): Performed by: PSYCHIATRY & NEUROLOGY

## 2024-08-24 PROCEDURE — 99232 SBSQ HOSP IP/OBS MODERATE 35: CPT | Performed by: INTERNAL MEDICINE

## 2024-08-24 PROCEDURE — 6370000000 HC RX 637 (ALT 250 FOR IP): Performed by: NURSE PRACTITIONER

## 2024-08-24 PROCEDURE — 94640 AIRWAY INHALATION TREATMENT: CPT

## 2024-08-24 PROCEDURE — 94761 N-INVAS EAR/PLS OXIMETRY MLT: CPT

## 2024-08-24 PROCEDURE — 80069 RENAL FUNCTION PANEL: CPT

## 2024-08-24 PROCEDURE — 6370000000 HC RX 637 (ALT 250 FOR IP): Performed by: FAMILY MEDICINE

## 2024-08-24 RX ORDER — INSULIN GLARGINE 100 [IU]/ML
16 INJECTION, SOLUTION SUBCUTANEOUS NIGHTLY
Qty: 10 ML | Refills: 3 | Status: ON HOLD | OUTPATIENT
Start: 2024-08-24 | End: 2024-09-16 | Stop reason: HOSPADM

## 2024-08-24 RX ORDER — INSULIN GLARGINE 100 [IU]/ML
70 INJECTION, SOLUTION SUBCUTANEOUS DAILY
Status: DISCONTINUED | OUTPATIENT
Start: 2024-08-25 | End: 2024-08-24 | Stop reason: HOSPADM

## 2024-08-24 RX ORDER — GLUCAGON 1 MG/ML
1 KIT INJECTION PRN
Status: DISCONTINUED | OUTPATIENT
Start: 2024-08-24 | End: 2024-08-24 | Stop reason: HOSPADM

## 2024-08-24 RX ORDER — INSULIN GLARGINE 100 [IU]/ML
70 INJECTION, SOLUTION SUBCUTANEOUS DAILY
Qty: 10 ML | Refills: 3 | Status: SHIPPED | OUTPATIENT
Start: 2024-08-25 | End: 2024-09-03

## 2024-08-24 RX ORDER — DEXTROSE MONOHYDRATE 100 MG/ML
INJECTION, SOLUTION INTRAVENOUS CONTINUOUS PRN
Status: DISCONTINUED | OUTPATIENT
Start: 2024-08-24 | End: 2024-08-24 | Stop reason: HOSPADM

## 2024-08-24 RX ORDER — INSULIN GLARGINE 100 [IU]/ML
16 INJECTION, SOLUTION SUBCUTANEOUS NIGHTLY
Status: DISCONTINUED | OUTPATIENT
Start: 2024-08-24 | End: 2024-08-24 | Stop reason: HOSPADM

## 2024-08-24 RX ADMIN — HYOSCYAMINE SULFATE: 16 SOLUTION at 09:49

## 2024-08-24 RX ADMIN — CLOTRIMAZOLE 10 MG: 10 LOZENGE ORAL at 09:46

## 2024-08-24 RX ADMIN — Medication 400 MG: at 09:46

## 2024-08-24 RX ADMIN — ZINC SULFATE 220 MG (50 MG) CAPSULE 50 MG: CAPSULE at 09:46

## 2024-08-24 RX ADMIN — Medication 2 PUFF: at 09:00

## 2024-08-24 RX ADMIN — SENNOSIDES 8.6 MG: 8.6 TABLET, FILM COATED ORAL at 09:46

## 2024-08-24 RX ADMIN — CEFEPIME 2000 MG: 2 INJECTION, POWDER, FOR SOLUTION INTRAVENOUS at 12:11

## 2024-08-24 RX ADMIN — FERROUS SULFATE TAB 325 MG (65 MG ELEMENTAL FE) 325 MG: 325 (65 FE) TAB at 09:46

## 2024-08-24 RX ADMIN — BUMETANIDE 1 MG: 1 TABLET ORAL at 09:46

## 2024-08-24 RX ADMIN — Medication 400 UNITS: at 09:46

## 2024-08-24 RX ADMIN — SODIUM CHLORIDE, PRESERVATIVE FREE 10 ML: 5 INJECTION INTRAVENOUS at 09:47

## 2024-08-24 RX ADMIN — CEFEPIME 2000 MG: 2 INJECTION, POWDER, FOR SOLUTION INTRAVENOUS at 01:01

## 2024-08-24 RX ADMIN — ASPIRIN 81 MG CHEWABLE TABLET 81 MG: 81 TABLET CHEWABLE at 09:46

## 2024-08-24 RX ADMIN — OXYCODONE HYDROCHLORIDE 10 MG: 10 TABLET ORAL at 06:35

## 2024-08-24 RX ADMIN — GABAPENTIN 400 MG: 400 CAPSULE ORAL at 09:45

## 2024-08-24 RX ADMIN — OXYCODONE HYDROCHLORIDE AND ACETAMINOPHEN 1000 MG: 500 TABLET ORAL at 09:46

## 2024-08-24 RX ADMIN — FLUTICASONE PROPIONATE 2 SPRAY: 50 SPRAY, METERED NASAL at 09:47

## 2024-08-24 RX ADMIN — PROPRANOLOL HYDROCHLORIDE 40 MG: 40 TABLET ORAL at 09:46

## 2024-08-24 RX ADMIN — Medication 1 CAPSULE: at 09:46

## 2024-08-24 RX ADMIN — ISOSORBIDE DINITRATE 20 MG: 5 TABLET ORAL at 12:14

## 2024-08-24 RX ADMIN — PANTOPRAZOLE SODIUM 40 MG: 40 TABLET, DELAYED RELEASE ORAL at 06:35

## 2024-08-24 RX ADMIN — ISOSORBIDE DINITRATE 20 MG: 5 TABLET ORAL at 09:46

## 2024-08-24 RX ADMIN — WHITE PETROLATUM,ZINC OXIDE: 57; 17 PASTE TOPICAL at 09:50

## 2024-08-24 RX ADMIN — CALCIUM CARBONATE 500 MG: 500 TABLET, CHEWABLE ORAL at 09:46

## 2024-08-24 RX ADMIN — HYDRALAZINE HYDROCHLORIDE 10 MG: 10 TABLET ORAL at 06:34

## 2024-08-24 RX ADMIN — Medication 1 MG: at 09:46

## 2024-08-24 RX ADMIN — TAMSULOSIN HYDROCHLORIDE 0.8 MG: 0.4 CAPSULE ORAL at 09:49

## 2024-08-24 RX ADMIN — Medication 1 LOZENGE: at 12:13

## 2024-08-24 ASSESSMENT — PAIN DESCRIPTION - LOCATION
LOCATION: GENERALIZED
LOCATION: THROAT

## 2024-08-24 ASSESSMENT — PAIN DESCRIPTION - ORIENTATION: ORIENTATION: RIGHT;LEFT

## 2024-08-24 ASSESSMENT — PAIN DESCRIPTION - DESCRIPTORS: DESCRIPTORS: ACHING

## 2024-08-24 ASSESSMENT — PAIN SCALES - GENERAL
PAINLEVEL_OUTOF10: 0
PAINLEVEL_OUTOF10: 8
PAINLEVEL_OUTOF10: 4

## 2024-08-24 ASSESSMENT — PAIN - FUNCTIONAL ASSESSMENT: PAIN_FUNCTIONAL_ASSESSMENT: ACTIVITIES ARE NOT PREVENTED

## 2024-08-24 ASSESSMENT — PAIN SCALES - WONG BAKER: WONGBAKER_NUMERICALRESPONSE: NO HURT

## 2024-08-24 NOTE — PROGRESS NOTES
Nephrology follow up          Patient: Shantanu Casillas MRN: [id number]  SSN: [id number]    YOB: 1955  Age: 68 y.o.  Sex: male      Subjective:   The pt is seen in the room.  Blood pressures are good  Afebrile  On nasal cannula 3 L  Resolving lower extremity swelling  resolving DEMARCO  Past Medical History:   Diagnosis Date    Arthritis     CAD (coronary artery disease)     Chronic obstructive pulmonary disease (HCC)     Diabetes (HCC)     Gout     Hypertension     Ill-defined condition     Sleep apnea     cpap     Past Surgical History:   Procedure Laterality Date    FOOT DEBRIDEMENT Right 12/15/2023    INCISION AND DRAINAGE RIGHT FIRST METATARSAL PHALANGEAL JOINT performed by Polo Oviedo DPM at [facility] MAIN OR    FOOT SURGERY      right heel - foriegn object    HX VEIN ABLATION ADHESIVE      ORTHOPEDIC SURGERY      back surgery    PACEMAKER      aicd -      Family History   Problem Relation Age of Onset    Diabetes Mother     Heart Disease Father     Hypertension Father     Diabetes Sister     Diabetes Brother     Hypertension Mother     Heart Disease Mother     Kidney Disease Mother      Social History     Tobacco Use    Smoking status: Never    Smokeless tobacco: Never   Substance Use Topics    Alcohol use: Not Currently      Current Facility-Administered Medications   Medication Dose Route Frequency Provider Last Rate Last Admin    ceFEPIme (MAXIPIME) 2,000 mg in sodium chloride 0.9 % 100 mL IVPB (mini-bag)  2,000 mg IntraVENous Q12H Sergei Kolb MD   Stopped at 08/24/24 0511    bumetanide (BUMEX) tablet 1 mg  1 mg Oral BID Bhupendra Hayden MD   1 mg at 08/24/24 0946    loperamide (IMODIUM) capsule 2 mg  2 mg Oral 4x Daily PRN Sergei Kolb MD   2 mg at 08/21/24 1732    lactobacillus (CULTURELLE) capsule 1 capsule  1 capsule Oral Daily with breakfast Ed Pino MD   1 capsule at 08/24/24 0946    propranolol (INDERAL) tablet 40 mg  40 mg Oral BID Yue Botello MD  Kitty Phelps APRN - CNP   4 Units at 08/22/24 1311    insulin lispro (HUMALOG,ADMELOG) injection vial 0-4 Units  0-4 Units SubCUTAneous Nightly Kitty Phelps APRN - CNP   4 Units at 08/21/24 2148    azelastine (ASTELIN) 0.1 % nasal spray 2 spray  2 spray Each Nostril BID Ed Pino MD            Allergies   Allergen Reactions    Amitriptyline Angioedema    Naproxen      Other reaction(s): Unknown (comments)  Pt not sure of reaction    Sulfa Antibiotics Nausea And Vomiting       Review of Systems:  A comprehensive review of systems was negative except for that written in the History of Present Illness.    Objective:     Vitals:    08/24/24 0635 08/24/24 0705 08/24/24 0904 08/24/24 0930   BP:    115/67   Pulse:   66 70   Resp: 14 16 16 16   Temp:    97.5 °F (36.4 °C)   TempSrc:    Oral   SpO2:   95% 96%   Weight:       Height:            Physical Exam:  General: NAD  Eyes: sclera anicteric  Oral Cavity: No thrush or ulcers  Neck: no JVD  Chest: Fair bilateral air entry  Heart: normal sounds  Abdomen: soft and non tender   : no juares  Lower Extremities: edema+  Skin: no rash  Neuro: intact  Psychiatric: non-depressed          Assessment/Plan:   Acute kidney injury on chronic kidney disease stage III  2/2 prerenal from cardiorenal syndrome  BUN/creatinine peaked at 96/3.6.  Resolving DEMARCO, BUN/Cr 73/1.9.  Last creatinine 1.8 on 8/6/2024.  Difficult to determine exact baseline creatinine due to multiple recent DEMARCO's.  Better volume status.  [place] UA and no proteinuria.  Has external catheter and voiding without any problem  Po diuresis..    Hyperkalemia  Due to acute kidney injury  Potassium 5.8  Medically treated  Potassium 5.4 today  Received P.o. Lokelma 10 g 1 dose  Low potassium diet  IV diuresis will help lowering K  Received Kayexalate.  K 4.8.    Acute CHF  Due to LV systolic and diastolic dysfunction  LVEF 40 to 45%  SOB and increasing swelling of extremities  Chest x-ray mild pulmonary

## 2024-08-24 NOTE — PLAN OF CARE
PT attempted to see patient for skilled therapy session however nursing reports that patient is currently getting ready to discharge to [facility].

## 2024-08-24 NOTE — DISCHARGE SUMMARY
Discharge Summary       PATIENT ID: Shantanu Casillas  MRN: [id number]   YOB: 1955    DATE OF ADMISSION: 8/15/2024   DATE OF DISCHARGE:   PRIMARY CARE PROVIDER: [unfilled]      ATTENDING PHYSICIAN: Ed Pino  DISCHARGING PROVIDER: Ed Pino      CONSULTATIONS: IP CONSULT TO NEPHROLOGY  IP CONSULT TO CARDIOLOGY  IP WOUND CARE NURSE CONSULT TO EVAL  IP CONSULT TO INFECTIOUS DISEASES  IP CONSULT TO PODIATRY  IP CONSULT TO PULMONOLOGY  IP CONSULT TO DIETITIAN  IP CONSULT TO TELE-NEUROLOGY    PROCEDURES/SURGERIES: * No surgery found *    ADMITTING DIAGNOSES:    Patient Active Problem List    Diagnosis Date Noted    Acute and chronic respiratory failure with hypoxia (HCC) 08/20/2024    Volume overload 08/15/2024    Fluid overload 08/15/2024    Ulcer of right foot with fat layer exposed (HCC) 07/19/2024    Generalized edema 02/19/2024    Acute pneumonia 02/13/2024    CAP (community acquired pneumonia) due to Chlamydia species 02/13/2024    Open wound of right foot 10/11/2023    Atherosclerotic heart disease of native coronary artery without angina pectoris 08/28/2023    Chronic obstructive pulmonary disease, unspecified (HCC) 08/28/2023    Chronic pain disorder 08/28/2023    Dependence on supplemental oxygen 08/28/2023    Difficulty in walking, not elsewhere classified 08/28/2023    Gout, unspecified 08/28/2023    Hypertensive heart disease with heart failure (HCC) 08/28/2023    Morbid (severe) obesity due to excess calories (HCC) 08/28/2023    Muscle weakness (generalized) 08/28/2023    Obstructive sleep apnea (adult) (pediatric) 08/28/2023    Other lack of coordination 08/28/2023    Other symptoms and signs involving the musculoskeletal system 08/28/2023    Personal history of COVID-19 08/28/2023    Presence of cardiac pacemaker 08/28/2023    Type 2 diabetes mellitus with diabetic peripheral angiopathy without gangrene (HCC) 08/28/2023    Unspecified cirrhosis of liver (HCC) 08/28/2023       COVID-19 detected on PCR     EKG demonstrates ventricular paced rhythm     Chest x-ray performed 8/15-cardiomegaly and mild pulmonary edema     Consulted nephrology 8/15     Consulted infectious disease 8/15 -  Check CRP, procal, LDH and ferritin  Continue Linezolid for now     Consulted cardiology 8/16-may require echocardiogram for further management as patient is clinically stable for me to expose other team member in the hospital to proceed with echocardiogram as it is not deemed necessary based on my clinical assessment. [facility] guideline directed medical management for cardiomyopathy       8/17  HPI-patient was lying down and in no apparent distress.  He endorses nausea and shortness of breath.  Still has cough but improving.  Also mention pain in his legs.He denies chest pain, palpitations, fever, headache, abdominal pain and diarrhea.  On 3 L nasal cannula     Vitals-/91 pulse 83 temperature 97.7 respirations 18 O2 99%     Labs-BUN 82, creatinine 3.44, GFR 18, glucose 308  Urine analysis demonstrates moderate traces of leukocyte esterase     Patient asking for scheduled pain medication     8/18  HPI-patient sitting upright in no apparent distress.  He claims his symptoms are overall improving.  He is on 3 L nasal cannula     Vitals-/73 pulse 76 temperature 97.7 respirations 18 O2 99%     Labs-potassium 5.3, BUN 96, creatinine 3.39, GFR 19, glucose 234     Respiratory culture 8/17-Gram stain demonstrates 1+ WBCs, 3+ gram-negative rods, occasional gram-positive cocci in pairs     8/19  HPI-patient was sitting upright in no apparent distress.  He endorses dry mouth.  He denies any other new symptoms.  He is on 3 L nasal cannula     Vitals-/81 pulse 71 temperature 97.9 respirations 20 O2 100%     Labs-BUN 91, creatinine 3.21, GFR 20, glucose 259, BNP 6482, hemoglobin 8.9, hematocrit 28.9     EKG demonstrates ventricular paced rhythm     Consulted cardiology 8/19-once COVID infection test is  negative we will proceed with echocardiogram     8/20  HPI-patient lying in bed in no apparent distress.  He endorses diarrhea.  Denies any other new symptoms.  He is on 3 L nasal cannula     Vitals-/53 pulse 74 temperature 97.7 respirations 18 O2 100%     Labs-glucose 259, CRP 3.22, hemoglobin 9.9, hematocrit 32.1     X-ray of chest 8/20-resolved pulmonary edema     Consulted pulmonology 8/20- BIPAP for non invasive ventilatory life support to avoid worsening respiratory acidosis, hypercacarbic or hypoxic respiratory arrest. Intubate and place on vent if NIV fails     Consulted nephrology 8/19-will hold IV diuresis.  Discontinue oral sodium bicarbonate     Consulted OT 8/19- Frequent repositioning to prevent skin breakdown, Use of BSC for toileting , and Assist x2      Consulted PT 8/19- Out of bed to chair for meals, Encourage HEP in prep for ADLs/mobility, AD and gt belt for bed to chair , Amb to bathroom with AD and gait belt, and Assist x1      8/21     Patient sitting side of the bed alert awake feeling much better BUN 97 creatinine 2.58 chest x-ray shows resolving pulmonary edema  Seen by the neurology for tremors started on propranolol 40 mg twice a day     8/22     Patient feeling much better today  Seen by infectious disease recommended continue Rocephin at this time  Seen by podiatrist recommend wound care    8/23    Patient alert awake denies any chest pain or shortness of breath nausea no vomiting sitting on the chair with no new complaints have a dressing change of the wounds yesterday in the legs BUN was 83 creatinine 2.18 BNP is 3000    [facility] on IV Rocephin and follow-up with the wound care nurse at the rehab facility and also follow with nephrology regarding renal failure renal function    Medication reconciliation done  8/24  Patient waiting for placement had hypoglycemia medications adjusted okay for discharge    Signed:   Oliver Murrell

## 2024-08-24 NOTE — PROGRESS NOTES
Pt bg resulted at 69. Patient now eating breakfast and asymptomatic. Patient given extra crackers and juice. Will be reassessed.

## 2024-08-24 NOTE — PROGRESS NOTES
Infectious Disease Progress Note           Subjective:   Remains stable, denies new complaints, no acute events since last seen, afebrile, routine labs not done today   Objective:   Physical Exam:     /67   Pulse 70   Temp 97.5 °F (36.4 °C) (Oral)   Resp 16   Ht 1.803 m (5' 11\")   Wt (!) 166.2 kg (366 lb 6.5 oz)   SpO2 96%   BMI 51.10 kg/m²  O2 Flow Rate (L/min): 3 L/min O2 Device: Nasal cannula    Temp (24hrs), Av.5 °F (36.4 °C), Min:97.5 °F (36.4 °C), Max:97.5 °F (36.4 °C)    701 -  190  In: 100 [P.O.:100]  Out: -    1901 -  0700  In: 1370 [P.O.:1330; I.V.:40]  Out: 2550 [Urine:2550]    General: NAD, AAO x 4  HEENT: REBEKAH, Moist mucosa   Lungs: CTA b/l, decreased at the bases, no wheeze/rhonchi   Heart: S1S2+, RRR, no murmur  Abdo: Soft, NT, ND, +BS   : no chronic juares cath   Exts: decreased b/l leg swelling, compression dressing in place   Skin: b/l leg dressings in place, not examined     Data Review:       Recent Days:    Recent Labs     24  0818 24  0955   WBC 8.1 11.2*   HGB 9.4* 10.8*   HCT 31.0* 36.8   * 164     Recent Labs     24  0818 24  0955 24  0938   BUN 92* 83* 73*   CREATININE 2.56* 2.18* 1.95*       Lab Results   Component Value Date/Time    CRP 3.22 2024 10:39 AM          Microbiology     Results       Procedure Component Value Units Date/Time    COVID-19, Rapid [[id number]]  (Abnormal) Collected: 24 1530    Order Status: Completed Specimen: Nasopharyngeal Updated: 24 1619     Source Nasopharyngeal        SARS-CoV-2, PCR DETECTED        Comment:   Positive results are indicative of the presence of SARS-CoV-2. Clinical correlation with patient history and other diagnostic information is necessary to determine patient infection status. Positive results do not rule out bacterial infection or co-infection with other pathogens.      Testing was performed using osito Della SARS-CoV-2

## 2024-08-24 NOTE — PLAN OF CARE
Problem: Discharge Planning  Goal: Discharge to home or other facility with appropriate resources  Outcome: Progressing     Problem: Skin/Tissue Integrity  Goal: Absence of new skin breakdown  Description: 1.  Monitor for areas of redness and/or skin breakdown  2.  Assess vascular access sites hourly  3.  Every 4-6 hours minimum:  Change oxygen saturation probe site  4.  Every 4-6 hours:  If on nasal continuous positive airway pressure, respiratory therapy assess nares and determine need for appliance change or resting period.  Outcome: Progressing     Problem: ABCDS Injury Assessment  Goal: Absence of physical injury  Outcome: Progressing     Problem: Safety - Adult  Goal: Free from fall injury  Outcome: Progressing     Problem: Respiratory - Adult  Goal: Achieves optimal ventilation and oxygenation  Outcome: Progressing     Problem: Cardiovascular - Adult  Goal: Maintains optimal cardiac output and hemodynamic stability  Outcome: Progressing  Goal: Absence of cardiac dysrhythmias or at baseline  Outcome: Progressing     Problem: Skin/Tissue Integrity - Adult  Goal: Incisions, wounds, or drain sites healing without S/S of infection  Outcome: Progressing     Problem: Infection - Adult  Goal: Absence of infection at discharge  Outcome: Progressing  Goal: Absence of infection during hospitalization  Outcome: Progressing  Goal: Absence of fever/infection during anticipated neutropenic period  Outcome: Progressing     Problem: Metabolic/Fluid and Electrolytes - Adult  Goal: Electrolytes maintained within normal limits  Outcome: Progressing     Problem: Pain  Goal: Verbalizes/displays adequate comfort level or baseline comfort level  Outcome: Progressing     Problem: Chronic Conditions and Co-morbidities  Goal: Patient's chronic conditions and co-morbidity symptoms are monitored and maintained or improved  Outcome: Progressing     Problem: Physical Therapy - Adult  Goal: By Discharge: Performs mobility at highest level  of function for planned discharge setting.  See evaluation for individualized goals.  Description: FUNCTIONAL STATUS PRIOR TO ADMISSION: Patient was modified independent using a rollator for functional mobility.    HOME SUPPORT PRIOR TO ADMISSION: The patient lived with wife and daughter but did not require assistance for mobility.    Physical Therapy Goals  Initiated 8/18/2024  Pt stated goal: \"Get my strength back\"  Pt will be I with LE HEP in 7 days.  Pt will perform bed mobility with [place] in 7 days.  Pt will perform transfers with Modified [place] in 7 days.   Pt will amb 50 feet with LRAD safely with Supervision in 7 days.  Pt will demonstrate improvement in static standing balance from Contact Guard Assist to [place] in 7 days.     8/23/2024 1546 by Nuha Bojorquez, PTA  Outcome: Progressing     Problem: Occupational Therapy - Adult  Goal: By Discharge: Performs self-care activities at highest level of function for planned discharge setting.  See evaluation for individualized goals.  Description: FUNCTIONAL STATUS PRIOR TO ADMISSION:  Patient was ambulatory using RW as needed for mobility and able to actively assist with Grooming, Bathing, Dressing, and Toileting.    HOME SUPPORT: The patient lived with spouse.    Occupational Therapy Goals:  Initiated 8/19/2024  Patient/Family stated goal: I want to be able to use my hands better  1.  Patient will perform self-feeding with Modified [place] with foam tubing on utensil handle and Additional Time within 7 day(s).  2.  Patient will perform lower body dressing with Moderate Assist, using a reacher for pants while sitting on EOB and Additional Time within 7 day(s).  3.  Patient will perform upper body bathing sitting on the EOB with Minimal Assist, Additional Time, and Assist x1 within 7 day(s).  4.  Patient will perform all aspects of toileting with Moderate Assist, Additional Time, and Assist x1 within 7 day(s).  5.  Patient will

## 2024-08-24 NOTE — PROGRESS NOTES
CM sent note to [facility] this am to determine bed availability. Currently awaiting response.     [facility] was notified yesterday patient would need bariatric bed at their facility.     0900  Patient accepted at [facility] and can go today after 2 pm. Nursing report can be called to [phone].     Transition of Care Plan:    RUR: 27%  Prior Level of Functioning: independnet  Disposition: IRF  If SNF or IPR: Date FOC offered: 8/19  Date FOC received: 8/19  Accepting facility: [facility]  Date authorization started with reference number: n/a  Date authorization received and expires: n/a  Follow up appointments: no  DME needed: no  Transportation at discharge: unsure may need transport  IM/IMM Medicare/ letter given: yes  Is patient a  and connected with [facility]? N/a   If yes, was [place] transfer form completed and [facility] notified?   Caregiver Contact: n/a  Discharge Caregiver contacted prior to discharge? N/a  Care Conference needed? N/a  Barriers to discharge: n/a

## 2024-09-02 ENCOUNTER — HOSPITAL ENCOUNTER (OUTPATIENT)
Facility: HOSPITAL | Age: 69
Setting detail: SPECIMEN
Discharge: HOME | End: 2024-09-05

## 2024-09-02 LAB
ANION GAP SERPL CALC-SCNC: 5 MMOL/L (ref 5–15)
BASOPHILS # BLD: 0 K/UL (ref 0–0.1)
BASOPHILS NFR BLD: 0 % (ref 0–1)
BUN SERPL-MCNC: 51 MG/DL (ref 6–20)
BUN/CREAT SERPL: 17 (ref 12–20)
CA-I BLD-MCNC: 8.7 MG/DL (ref 8.5–10.1)
CHLORIDE SERPL-SCNC: 110 MMOL/L (ref 97–108)
CO2 SERPL-SCNC: 25 MMOL/L (ref 21–32)
CREAT SERPL-MCNC: 2.97 MG/DL (ref 0.7–1.3)
DIFFERENTIAL METHOD BLD: ABNORMAL
EOSINOPHIL # BLD: 0.3 K/UL (ref 0–0.4)
EOSINOPHIL NFR BLD: 4 % (ref 0–7)
ERYTHROCYTE [DISTWIDTH] IN BLOOD BY AUTOMATED COUNT: 21.4 % (ref 11.5–14.5)
GLUCOSE SERPL-MCNC: 178 MG/DL (ref 65–100)
HCT VFR BLD AUTO: 26.2 % (ref 36.6–50.3)
HGB BLD-MCNC: 8 G/DL (ref 12.1–17)
IMM GRANULOCYTES # BLD AUTO: 0 K/UL (ref 0–0.04)
IMM GRANULOCYTES NFR BLD AUTO: 0 % (ref 0–0.5)
LYMPHOCYTES # BLD: 1 K/UL (ref 0.8–3.5)
LYMPHOCYTES NFR BLD: 12 % (ref 12–49)
MCH RBC QN AUTO: 30.7 PG (ref 26–34)
MCHC RBC AUTO-ENTMCNC: 30.5 G/DL (ref 30–36.5)
MCV RBC AUTO: 100.4 FL (ref 80–99)
MONOCYTES # BLD: 0.9 K/UL (ref 0–1)
MONOCYTES NFR BLD: 10 % (ref 5–13)
NEUTS SEG # BLD: 6.3 K/UL (ref 1.8–8)
NEUTS SEG NFR BLD: 74 % (ref 32–75)
NRBC # BLD: 0 K/UL (ref 0–0.01)
NRBC BLD-RTO: 0 PER 100 WBC
PLATELET # BLD AUTO: 218 K/UL (ref 150–400)
PMV BLD AUTO: 11.7 FL (ref 8.9–12.9)
POTASSIUM SERPL-SCNC: 5.5 MMOL/L (ref 3.5–5.1)
RBC # BLD AUTO: 2.61 M/UL (ref 4.1–5.7)
RBC MORPH BLD: ABNORMAL
SODIUM SERPL-SCNC: 140 MMOL/L (ref 136–145)
WBC # BLD AUTO: 8.5 K/UL (ref 4.1–11.1)

## 2024-09-02 PROCEDURE — 85025 COMPLETE CBC W/AUTO DIFF WBC: CPT

## 2024-09-02 PROCEDURE — 36415 COLL VENOUS BLD VENIPUNCTURE: CPT

## 2024-09-02 PROCEDURE — 80048 BASIC METABOLIC PNL TOTAL CA: CPT

## 2024-09-03 ENCOUNTER — HOSPITAL ENCOUNTER (INPATIENT)
Facility: HOSPITAL | Age: 69
LOS: 15 days | Discharge: NURSING, REHAB OR LONG-TERM CARE | DRG: 291 | End: 2024-09-18
Attending: STUDENT IN AN ORGANIZED HEALTH CARE EDUCATION/TRAINING PROGRAM
Payer: MEDICARE

## 2024-09-03 ENCOUNTER — APPOINTMENT (OUTPATIENT)
Facility: HOSPITAL | Age: 69
DRG: 291 | End: 2024-09-03
Payer: MEDICARE

## 2024-09-03 DIAGNOSIS — I50.9 ACUTE DECOMPENSATED HEART FAILURE (HCC): Primary | ICD-10-CM

## 2024-09-03 PROBLEM — N18.9 ACUTE KIDNEY INJURY SUPERIMPOSED ON CKD (HCC): Status: ACTIVE | Noted: 2022-07-11

## 2024-09-03 PROBLEM — E87.5 HYPERKALEMIA: Status: ACTIVE | Noted: 2024-09-03

## 2024-09-03 PROBLEM — N17.9 ACUTE KIDNEY INJURY SUPERIMPOSED ON CKD (HCC): Status: ACTIVE | Noted: 2022-07-11

## 2024-09-03 LAB
ALBUMIN SERPL-MCNC: 2.1 G/DL (ref 3.5–5)
ALBUMIN/GLOB SERPL: 0.5 (ref 1.1–2.2)
ALP SERPL-CCNC: 94 U/L (ref 45–117)
ALT SERPL-CCNC: 27 U/L (ref 12–78)
ANION GAP SERPL CALC-SCNC: 6 MMOL/L (ref 5–15)
ANION GAP SERPL CALC-SCNC: 7 MMOL/L (ref 5–15)
ARTERIAL PATENCY WRIST A: YES
AST SERPL W P-5'-P-CCNC: 47 U/L (ref 15–37)
BASE DEFICIT BLDA-SCNC: 2.3 MMOL/L
BASOPHILS # BLD: 0 K/UL (ref 0–0.1)
BASOPHILS NFR BLD: 0 % (ref 0–1)
BDY SITE: ABNORMAL
BILIRUB SERPL-MCNC: 0.5 MG/DL (ref 0.2–1)
BNP SERPL-MCNC: 3535 PG/ML
BODY TEMPERATURE: 98.6
BUN SERPL-MCNC: 51 MG/DL (ref 6–20)
BUN SERPL-MCNC: 52 MG/DL (ref 6–20)
BUN/CREAT SERPL: 16 (ref 12–20)
BUN/CREAT SERPL: 17 (ref 12–20)
CA-I BLD-MCNC: 8.8 MG/DL (ref 8.5–10.1)
CA-I BLD-MCNC: 9.4 MG/DL (ref 8.5–10.1)
CHLORIDE SERPL-SCNC: 109 MMOL/L (ref 97–108)
CHLORIDE SERPL-SCNC: 111 MMOL/L (ref 97–108)
CO2 SERPL-SCNC: 23 MMOL/L (ref 21–32)
CO2 SERPL-SCNC: 25 MMOL/L (ref 21–32)
COHGB MFR BLD: 0.4 % (ref 1–2)
CREAT SERPL-MCNC: 3.11 MG/DL (ref 0.7–1.3)
CREAT SERPL-MCNC: 3.19 MG/DL (ref 0.7–1.3)
DIFFERENTIAL METHOD BLD: ABNORMAL
EOSINOPHIL # BLD: 0.4 K/UL (ref 0–0.4)
EOSINOPHIL NFR BLD: 5 % (ref 0–7)
ERYTHROCYTE [DISTWIDTH] IN BLOOD BY AUTOMATED COUNT: 21.6 % (ref 11.5–14.5)
FIO2 ON VENT: 32 %
GAS FLOW.O2 O2 DELIVERY SYS: 3 L/MIN
GLOBULIN SER CALC-MCNC: 3.9 G/DL (ref 2–4)
GLUCOSE BLD STRIP.AUTO-MCNC: 148 MG/DL (ref 65–100)
GLUCOSE BLD STRIP.AUTO-MCNC: 180 MG/DL (ref 65–100)
GLUCOSE SERPL-MCNC: 164 MG/DL (ref 65–100)
GLUCOSE SERPL-MCNC: 170 MG/DL (ref 65–100)
HCO3 BLDA-SCNC: 23 MMOL/L (ref 22–26)
HCT VFR BLD AUTO: 26 % (ref 36.6–50.3)
HGB BLD-MCNC: 8 G/DL (ref 12.1–17)
IMM GRANULOCYTES # BLD AUTO: 0.1 K/UL (ref 0–0.04)
IMM GRANULOCYTES NFR BLD AUTO: 1 % (ref 0–0.5)
LYMPHOCYTES # BLD: 1.1 K/UL (ref 0.8–3.5)
LYMPHOCYTES NFR BLD: 16 % (ref 12–49)
MAGNESIUM SERPL-MCNC: 2.2 MG/DL (ref 1.6–2.4)
MAGNESIUM SERPL-MCNC: 2.3 MG/DL (ref 1.6–2.4)
MCH RBC QN AUTO: 30.3 PG (ref 26–34)
MCHC RBC AUTO-ENTMCNC: 30.8 G/DL (ref 30–36.5)
MCV RBC AUTO: 98.5 FL (ref 80–99)
METHGB MFR BLD: 0.4 % (ref 0–1.4)
MONOCYTES # BLD: 1 K/UL (ref 0–1)
MONOCYTES NFR BLD: 14 % (ref 5–13)
NEUTS SEG # BLD: 4.5 K/UL (ref 1.8–8)
NEUTS SEG NFR BLD: 64 % (ref 32–75)
NRBC # BLD: 0 K/UL (ref 0–0.01)
NRBC BLD-RTO: 0 PER 100 WBC
OXYHGB MFR BLD: 96.5 % (ref 95–99)
PCO2 BLDA: 44 MMHG (ref 35–45)
PERFORMED BY:: ABNORMAL
PH BLDA: 7.34 (ref 7.35–7.45)
PLATELET # BLD AUTO: 230 K/UL (ref 150–400)
PMV BLD AUTO: 11.3 FL (ref 8.9–12.9)
PO2 BLDA: 107 MMHG (ref 80–100)
POTASSIUM SERPL-SCNC: 5 MMOL/L (ref 3.5–5.1)
POTASSIUM SERPL-SCNC: 6 MMOL/L (ref 3.5–5.1)
PROT SERPL-MCNC: 6 G/DL (ref 6.4–8.2)
RBC # BLD AUTO: 2.64 M/UL (ref 4.1–5.7)
RBC MORPH BLD: ABNORMAL
RBC MORPH BLD: ABNORMAL
SAO2 % BLD: 97 % (ref 95–99)
SAO2% DEVICE SAO2% SENSOR NAME: ABNORMAL
SARS-COV-2 RNA RESP QL NAA+PROBE: DETECTED
SODIUM SERPL-SCNC: 140 MMOL/L (ref 136–145)
SODIUM SERPL-SCNC: 141 MMOL/L (ref 136–145)
SPECIMEN SITE: ABNORMAL
SPECIMEN SOURCE: ABNORMAL
TROPONIN I SERPL HS-MCNC: 16 NG/L (ref 0–76)
WBC # BLD AUTO: 7.1 K/UL (ref 4.1–11.1)

## 2024-09-03 PROCEDURE — 80053 COMPREHEN METABOLIC PANEL: CPT

## 2024-09-03 PROCEDURE — 87205 SMEAR GRAM STAIN: CPT

## 2024-09-03 PROCEDURE — 6370000000 HC RX 637 (ALT 250 FOR IP)

## 2024-09-03 PROCEDURE — 99285 EMERGENCY DEPT VISIT HI MDM: CPT

## 2024-09-03 PROCEDURE — 83880 ASSAY OF NATRIURETIC PEPTIDE: CPT

## 2024-09-03 PROCEDURE — 2580000003 HC RX 258

## 2024-09-03 PROCEDURE — 85025 COMPLETE CBC W/AUTO DIFF WBC: CPT

## 2024-09-03 PROCEDURE — 83735 ASSAY OF MAGNESIUM: CPT

## 2024-09-03 PROCEDURE — 6360000002 HC RX W HCPCS: Performed by: STUDENT IN AN ORGANIZED HEALTH CARE EDUCATION/TRAINING PROGRAM

## 2024-09-03 PROCEDURE — 2060000000 HC ICU INTERMEDIATE R&B

## 2024-09-03 PROCEDURE — 80048 BASIC METABOLIC PNL TOTAL CA: CPT

## 2024-09-03 PROCEDURE — 87040 BLOOD CULTURE FOR BACTERIA: CPT

## 2024-09-03 PROCEDURE — 87635 SARS-COV-2 COVID-19 AMP PRB: CPT

## 2024-09-03 PROCEDURE — 36600 WITHDRAWAL OF ARTERIAL BLOOD: CPT

## 2024-09-03 PROCEDURE — 94640 AIRWAY INHALATION TREATMENT: CPT

## 2024-09-03 PROCEDURE — 87070 CULTURE OTHR SPECIMN AEROBIC: CPT

## 2024-09-03 PROCEDURE — 87641 MR-STAPH DNA AMP PROBE: CPT

## 2024-09-03 PROCEDURE — 93005 ELECTROCARDIOGRAM TRACING: CPT | Performed by: STUDENT IN AN ORGANIZED HEALTH CARE EDUCATION/TRAINING PROGRAM

## 2024-09-03 PROCEDURE — 82962 GLUCOSE BLOOD TEST: CPT

## 2024-09-03 PROCEDURE — 82803 BLOOD GASES ANY COMBINATION: CPT

## 2024-09-03 PROCEDURE — 71045 X-RAY EXAM CHEST 1 VIEW: CPT

## 2024-09-03 PROCEDURE — 6360000002 HC RX W HCPCS

## 2024-09-03 PROCEDURE — 84484 ASSAY OF TROPONIN QUANT: CPT

## 2024-09-03 PROCEDURE — 87186 SC STD MICRODIL/AGAR DIL: CPT

## 2024-09-03 PROCEDURE — 87077 CULTURE AEROBIC IDENTIFY: CPT

## 2024-09-03 RX ORDER — POLYETHYLENE GLYCOL 3350 17 G/17G
17 POWDER, FOR SOLUTION ORAL DAILY PRN
Status: DISCONTINUED | OUTPATIENT
Start: 2024-09-03 | End: 2024-09-18 | Stop reason: HOSPADM

## 2024-09-03 RX ORDER — SODIUM CHLORIDE 0.9 % (FLUSH) 0.9 %
5-40 SYRINGE (ML) INJECTION PRN
Status: DISCONTINUED | OUTPATIENT
Start: 2024-09-03 | End: 2024-09-18 | Stop reason: HOSPADM

## 2024-09-03 RX ORDER — OXYCODONE HYDROCHLORIDE 10 MG/1
10 TABLET ORAL 4 TIMES DAILY
Status: DISCONTINUED | OUTPATIENT
Start: 2024-09-03 | End: 2024-09-15

## 2024-09-03 RX ORDER — LANOLIN ALCOHOL/MO/W.PET/CERES
3 CREAM (GRAM) TOPICAL NIGHTLY
Status: DISCONTINUED | OUTPATIENT
Start: 2024-09-03 | End: 2024-09-18 | Stop reason: HOSPADM

## 2024-09-03 RX ORDER — MAGNESIUM SULFATE IN WATER 40 MG/ML
2000 INJECTION, SOLUTION INTRAVENOUS PRN
Status: DISCONTINUED | OUTPATIENT
Start: 2024-09-03 | End: 2024-09-18 | Stop reason: HOSPADM

## 2024-09-03 RX ORDER — INSULIN LISPRO 100 [IU]/ML
0-16 INJECTION, SOLUTION INTRAVENOUS; SUBCUTANEOUS EVERY 4 HOURS
Status: DISCONTINUED | OUTPATIENT
Start: 2024-09-03 | End: 2024-09-04

## 2024-09-03 RX ORDER — BUMETANIDE 0.25 MG/ML
2 INJECTION INTRAMUSCULAR; INTRAVENOUS ONCE
Status: ON HOLD | COMMUNITY
End: 2024-09-16 | Stop reason: HOSPADM

## 2024-09-03 RX ORDER — IPRATROPIUM BROMIDE AND ALBUTEROL SULFATE 2.5; .5 MG/3ML; MG/3ML
1 SOLUTION RESPIRATORY (INHALATION) EVERY 4 HOURS PRN
Status: DISCONTINUED | OUTPATIENT
Start: 2024-09-03 | End: 2024-09-18 | Stop reason: HOSPADM

## 2024-09-03 RX ORDER — CETIRIZINE HYDROCHLORIDE 10 MG/1
10 TABLET ORAL DAILY
Status: DISCONTINUED | OUTPATIENT
Start: 2024-09-04 | End: 2024-09-18 | Stop reason: HOSPADM

## 2024-09-03 RX ORDER — GUAIFENESIN 200 MG/10ML
200 LIQUID ORAL 4 TIMES DAILY PRN
Status: DISCONTINUED | OUTPATIENT
Start: 2024-09-03 | End: 2024-09-18 | Stop reason: HOSPADM

## 2024-09-03 RX ORDER — ONDANSETRON 4 MG/1
4 TABLET, ORALLY DISINTEGRATING ORAL EVERY 8 HOURS PRN
Status: DISCONTINUED | OUTPATIENT
Start: 2024-09-03 | End: 2024-09-18 | Stop reason: HOSPADM

## 2024-09-03 RX ORDER — FERROUS SULFATE 325(65) MG
325 TABLET ORAL
Status: DISCONTINUED | OUTPATIENT
Start: 2024-09-04 | End: 2024-09-18 | Stop reason: HOSPADM

## 2024-09-03 RX ORDER — TAMSULOSIN HYDROCHLORIDE 0.4 MG/1
0.8 CAPSULE ORAL DAILY
Status: DISCONTINUED | OUTPATIENT
Start: 2024-09-04 | End: 2024-09-18 | Stop reason: HOSPADM

## 2024-09-03 RX ORDER — INSULIN LISPRO 100 [IU]/ML
0-8 INJECTION, SOLUTION INTRAVENOUS; SUBCUTANEOUS
Status: DISCONTINUED | OUTPATIENT
Start: 2024-09-03 | End: 2024-09-18 | Stop reason: HOSPADM

## 2024-09-03 RX ORDER — PANTOPRAZOLE SODIUM 40 MG/1
40 TABLET, DELAYED RELEASE ORAL
Status: DISCONTINUED | OUTPATIENT
Start: 2024-09-04 | End: 2024-09-18 | Stop reason: HOSPADM

## 2024-09-03 RX ORDER — AZELASTINE 1 MG/ML
2 SPRAY, METERED NASAL 2 TIMES DAILY
Status: DISCONTINUED | OUTPATIENT
Start: 2024-09-03 | End: 2024-09-03

## 2024-09-03 RX ORDER — LANOLIN ALCOHOL/MO/W.PET/CERES
400 CREAM (GRAM) TOPICAL DAILY
Status: DISCONTINUED | OUTPATIENT
Start: 2024-09-04 | End: 2024-09-18 | Stop reason: HOSPADM

## 2024-09-03 RX ORDER — INSULIN LISPRO 100 [IU]/ML
0-4 INJECTION, SOLUTION INTRAVENOUS; SUBCUTANEOUS NIGHTLY
Status: DISCONTINUED | OUTPATIENT
Start: 2024-09-03 | End: 2024-09-18 | Stop reason: HOSPADM

## 2024-09-03 RX ORDER — TRAZODONE HYDROCHLORIDE 50 MG/1
25 TABLET, FILM COATED ORAL NIGHTLY
Status: ON HOLD | COMMUNITY
End: 2024-09-16 | Stop reason: HOSPADM

## 2024-09-03 RX ORDER — ALBUTEROL SULFATE 90 UG/1
2 INHALANT RESPIRATORY (INHALATION) EVERY 4 HOURS PRN
Status: DISCONTINUED | OUTPATIENT
Start: 2024-09-03 | End: 2024-09-18 | Stop reason: HOSPADM

## 2024-09-03 RX ORDER — PROPRANOLOL HCL 10 MG
40 TABLET ORAL 2 TIMES DAILY
Status: DISCONTINUED | OUTPATIENT
Start: 2024-09-03 | End: 2024-09-05

## 2024-09-03 RX ORDER — LEVOFLOXACIN 5 MG/ML
500 INJECTION, SOLUTION INTRAVENOUS
Status: ON HOLD | COMMUNITY
End: 2024-09-16 | Stop reason: HOSPADM

## 2024-09-03 RX ORDER — MIDODRINE HYDROCHLORIDE 10 MG/1
10 TABLET ORAL 3 TIMES DAILY
COMMUNITY

## 2024-09-03 RX ORDER — NYSTATIN 100000 [USP'U]/ML
500000 SUSPENSION ORAL
Status: DISCONTINUED | OUTPATIENT
Start: 2024-09-03 | End: 2024-09-05

## 2024-09-03 RX ORDER — ONDANSETRON 2 MG/ML
4 INJECTION INTRAMUSCULAR; INTRAVENOUS EVERY 6 HOURS PRN
Status: DISCONTINUED | OUTPATIENT
Start: 2024-09-03 | End: 2024-09-18 | Stop reason: HOSPADM

## 2024-09-03 RX ORDER — MULTIVITAMIN WITH IRON
1 TABLET ORAL DAILY
Status: DISCONTINUED | OUTPATIENT
Start: 2024-09-04 | End: 2024-09-18 | Stop reason: HOSPADM

## 2024-09-03 RX ORDER — ISOSORBIDE DINITRATE 20 MG/1
20 TABLET ORAL 3 TIMES DAILY
Status: DISCONTINUED | OUTPATIENT
Start: 2024-09-03 | End: 2024-09-05

## 2024-09-03 RX ORDER — SENNOSIDES A AND B 8.6 MG/1
1 TABLET, FILM COATED ORAL DAILY
Status: DISCONTINUED | OUTPATIENT
Start: 2024-09-04 | End: 2024-09-18 | Stop reason: HOSPADM

## 2024-09-03 RX ORDER — GABAPENTIN 400 MG/1
400 CAPSULE ORAL 4 TIMES DAILY
Status: DISCONTINUED | OUTPATIENT
Start: 2024-09-03 | End: 2024-09-06

## 2024-09-03 RX ORDER — ATORVASTATIN CALCIUM 40 MG/1
40 TABLET, FILM COATED ORAL NIGHTLY
Status: DISCONTINUED | OUTPATIENT
Start: 2024-09-03 | End: 2024-09-18 | Stop reason: HOSPADM

## 2024-09-03 RX ORDER — SODIUM CHLORIDE 9 MG/ML
INJECTION, SOLUTION INTRAVENOUS PRN
Status: DISCONTINUED | OUTPATIENT
Start: 2024-09-03 | End: 2024-09-18 | Stop reason: HOSPADM

## 2024-09-03 RX ORDER — LACTOBACILLUS RHAMNOSUS GG 10B CELL
1 CAPSULE ORAL
Status: DISCONTINUED | OUTPATIENT
Start: 2024-09-04 | End: 2024-09-18 | Stop reason: HOSPADM

## 2024-09-03 RX ORDER — HYDROXYZINE HYDROCHLORIDE 10 MG/1
10 TABLET, FILM COATED ORAL EVERY 6 HOURS PRN
Status: DISCONTINUED | OUTPATIENT
Start: 2024-09-03 | End: 2024-09-18 | Stop reason: HOSPADM

## 2024-09-03 RX ORDER — CLONAZEPAM 0.5 MG/1
0.25 TABLET ORAL 2 TIMES DAILY PRN
Status: DISCONTINUED | OUTPATIENT
Start: 2024-09-03 | End: 2024-09-18 | Stop reason: HOSPADM

## 2024-09-03 RX ORDER — FLUTICASONE PROPIONATE 50 MCG
2 SPRAY, SUSPENSION (ML) NASAL DAILY
Status: DISCONTINUED | OUTPATIENT
Start: 2024-09-04 | End: 2024-09-18 | Stop reason: HOSPADM

## 2024-09-03 RX ORDER — GLUCAGON 1 MG/ML
1 KIT INJECTION PRN
Status: DISCONTINUED | OUTPATIENT
Start: 2024-09-03 | End: 2024-09-18 | Stop reason: HOSPADM

## 2024-09-03 RX ORDER — ZINC SULFATE 50(220)MG
50 CAPSULE ORAL DAILY
Status: DISCONTINUED | OUTPATIENT
Start: 2024-09-03 | End: 2024-09-18 | Stop reason: HOSPADM

## 2024-09-03 RX ORDER — HEPARIN SODIUM 5000 [USP'U]/ML
5000 INJECTION, SOLUTION INTRAVENOUS; SUBCUTANEOUS EVERY 8 HOURS SCHEDULED
COMMUNITY

## 2024-09-03 RX ORDER — ONDANSETRON 4 MG/1
4 TABLET, FILM COATED ORAL EVERY 8 HOURS PRN
Status: ON HOLD | COMMUNITY
End: 2024-09-16 | Stop reason: HOSPADM

## 2024-09-03 RX ORDER — ENOXAPARIN SODIUM 100 MG/ML
40 INJECTION SUBCUTANEOUS 2 TIMES DAILY
Status: DISCONTINUED | OUTPATIENT
Start: 2024-09-03 | End: 2024-09-04

## 2024-09-03 RX ORDER — BUDESONIDE AND FORMOTEROL FUMARATE DIHYDRATE 160; 4.5 UG/1; UG/1
2 AEROSOL RESPIRATORY (INHALATION)
Status: DISCONTINUED | OUTPATIENT
Start: 2024-09-03 | End: 2024-09-18 | Stop reason: HOSPADM

## 2024-09-03 RX ORDER — DEXTROSE MONOHYDRATE 100 MG/ML
INJECTION, SOLUTION INTRAVENOUS CONTINUOUS PRN
Status: DISCONTINUED | OUTPATIENT
Start: 2024-09-03 | End: 2024-09-18 | Stop reason: HOSPADM

## 2024-09-03 RX ORDER — ACETAMINOPHEN 325 MG/1
650 TABLET ORAL EVERY 4 HOURS PRN
COMMUNITY

## 2024-09-03 RX ORDER — POLYETHYLENE GLYCOL 3350 17 G/17G
17 POWDER, FOR SOLUTION ORAL EVERY MORNING
Status: DISCONTINUED | OUTPATIENT
Start: 2024-09-04 | End: 2024-09-18 | Stop reason: HOSPADM

## 2024-09-03 RX ORDER — ACETAMINOPHEN 325 MG/1
650 TABLET ORAL EVERY 4 HOURS PRN
Status: DISCONTINUED | OUTPATIENT
Start: 2024-09-03 | End: 2024-09-18 | Stop reason: HOSPADM

## 2024-09-03 RX ORDER — INSULIN GLARGINE 100 [IU]/ML
90 INJECTION, SOLUTION SUBCUTANEOUS EVERY MORNING
Status: DISCONTINUED | OUTPATIENT
Start: 2024-09-04 | End: 2024-09-18 | Stop reason: HOSPADM

## 2024-09-03 RX ORDER — POTASSIUM CHLORIDE 7.45 MG/ML
10 INJECTION INTRAVENOUS PRN
Status: DISCONTINUED | OUTPATIENT
Start: 2024-09-03 | End: 2024-09-18 | Stop reason: HOSPADM

## 2024-09-03 RX ORDER — ASPIRIN 81 MG/1
81 TABLET, CHEWABLE ORAL DAILY
Status: DISCONTINUED | OUTPATIENT
Start: 2024-09-04 | End: 2024-09-18 | Stop reason: HOSPADM

## 2024-09-03 RX ORDER — LORATADINE 10 MG/1
10 TABLET ORAL DAILY
Status: ON HOLD | COMMUNITY
End: 2024-09-16 | Stop reason: HOSPADM

## 2024-09-03 RX ORDER — VITAMIN E 268 MG
400 CAPSULE ORAL 2 TIMES DAILY
Status: DISCONTINUED | OUTPATIENT
Start: 2024-09-03 | End: 2024-09-18 | Stop reason: HOSPADM

## 2024-09-03 RX ORDER — SODIUM CHLORIDE 0.9 % (FLUSH) 0.9 %
5-40 SYRINGE (ML) INJECTION EVERY 12 HOURS SCHEDULED
Status: DISCONTINUED | OUTPATIENT
Start: 2024-09-03 | End: 2024-09-18 | Stop reason: HOSPADM

## 2024-09-03 RX ORDER — CALCIUM CARBONATE 500 MG/1
1 TABLET, CHEWABLE ORAL 2 TIMES DAILY
Status: DISCONTINUED | OUTPATIENT
Start: 2024-09-03 | End: 2024-09-18 | Stop reason: HOSPADM

## 2024-09-03 RX ORDER — FUROSEMIDE 10 MG/ML
40 INJECTION INTRAMUSCULAR; INTRAVENOUS
Status: COMPLETED | OUTPATIENT
Start: 2024-09-03 | End: 2024-09-03

## 2024-09-03 RX ORDER — FUROSEMIDE 10 MG/ML
40 INJECTION INTRAMUSCULAR; INTRAVENOUS 2 TIMES DAILY
Status: DISCONTINUED | OUTPATIENT
Start: 2024-09-03 | End: 2024-09-05

## 2024-09-03 RX ORDER — GLUCAGON 1 MG/ML
1 KIT INJECTION ONCE
Status: ON HOLD | COMMUNITY
End: 2024-09-16 | Stop reason: HOSPADM

## 2024-09-03 RX ORDER — ASCORBIC ACID 500 MG
1000 TABLET ORAL 2 TIMES DAILY
Status: DISCONTINUED | OUTPATIENT
Start: 2024-09-03 | End: 2024-09-18 | Stop reason: HOSPADM

## 2024-09-03 RX ORDER — SIMETHICONE 80 MG
80 TABLET,CHEWABLE ORAL EVERY 6 HOURS PRN
Status: DISCONTINUED | OUTPATIENT
Start: 2024-09-03 | End: 2024-09-18 | Stop reason: HOSPADM

## 2024-09-03 RX ORDER — INSULIN GLARGINE 100 [IU]/ML
40 INJECTION, SOLUTION SUBCUTANEOUS NIGHTLY
Status: DISCONTINUED | OUTPATIENT
Start: 2024-09-03 | End: 2024-09-18 | Stop reason: HOSPADM

## 2024-09-03 RX ORDER — SORBITOL SOLUTION 70 %
30 SOLUTION, ORAL MISCELLANEOUS DAILY PRN
Status: DISCONTINUED | OUTPATIENT
Start: 2024-09-03 | End: 2024-09-18 | Stop reason: HOSPADM

## 2024-09-03 RX ORDER — VITAMIN B COMPLEX
1 CAPSULE ORAL DAILY
Status: DISCONTINUED | OUTPATIENT
Start: 2024-09-04 | End: 2024-09-18 | Stop reason: HOSPADM

## 2024-09-03 RX ORDER — POTASSIUM CHLORIDE 1500 MG/1
40 TABLET, EXTENDED RELEASE ORAL PRN
Status: DISCONTINUED | OUTPATIENT
Start: 2024-09-03 | End: 2024-09-18 | Stop reason: HOSPADM

## 2024-09-03 RX ORDER — HYDRALAZINE HYDROCHLORIDE 50 MG/1
25 TABLET, FILM COATED ORAL EVERY 6 HOURS PRN
Status: DISCONTINUED | OUTPATIENT
Start: 2024-09-03 | End: 2024-09-05

## 2024-09-03 RX ORDER — FUROSEMIDE 20 MG
60 TABLET ORAL 2 TIMES DAILY
Status: ON HOLD | COMMUNITY
End: 2024-09-16 | Stop reason: HOSPADM

## 2024-09-03 RX ORDER — BUMETANIDE 2 MG/1
2 TABLET ORAL 2 TIMES DAILY
Status: ON HOLD | COMMUNITY
End: 2024-09-16 | Stop reason: HOSPADM

## 2024-09-03 RX ADMIN — NYSTATIN 500000 UNITS: 100000 SUSPENSION ORAL at 22:40

## 2024-09-03 RX ADMIN — GABAPENTIN 400 MG: 400 CAPSULE ORAL at 22:02

## 2024-09-03 RX ADMIN — VANCOMYCIN HYDROCHLORIDE 2500 MG: 1 INJECTION, POWDER, LYOPHILIZED, FOR SOLUTION INTRAVENOUS at 21:58

## 2024-09-03 RX ADMIN — FUROSEMIDE 40 MG: 10 INJECTION, SOLUTION INTRAMUSCULAR; INTRAVENOUS at 13:59

## 2024-09-03 RX ADMIN — CALCIUM CARBONATE 500 MG: 500 TABLET, CHEWABLE ORAL at 22:02

## 2024-09-03 RX ADMIN — ISOSORBIDE DINITRATE 20 MG: 20 TABLET ORAL at 17:58

## 2024-09-03 RX ADMIN — ATORVASTATIN CALCIUM 40 MG: 40 TABLET, FILM COATED ORAL at 22:03

## 2024-09-03 RX ADMIN — OXYCODONE HYDROCHLORIDE 10 MG: 10 TABLET ORAL at 22:02

## 2024-09-03 RX ADMIN — Medication 3 MG: at 22:02

## 2024-09-03 RX ADMIN — INSULIN GLARGINE 40 UNITS: 100 INJECTION, SOLUTION SUBCUTANEOUS at 22:00

## 2024-09-03 RX ADMIN — PROPRANOLOL HYDROCHLORIDE 40 MG: 10 TABLET ORAL at 22:02

## 2024-09-03 RX ADMIN — OXYCODONE HYDROCHLORIDE AND ACETAMINOPHEN 1000 MG: 500 TABLET ORAL at 22:02

## 2024-09-03 RX ADMIN — HYOSCYAMINE SULFATE: 16 SOLUTION at 23:11

## 2024-09-03 RX ADMIN — ENOXAPARIN SODIUM 40 MG: 100 INJECTION SUBCUTANEOUS at 21:59

## 2024-09-03 RX ADMIN — FUROSEMIDE 40 MG: 10 INJECTION, SOLUTION INTRAMUSCULAR; INTRAVENOUS at 17:53

## 2024-09-03 RX ADMIN — Medication 2 PUFF: at 21:47

## 2024-09-03 RX ADMIN — Medication 400 UNITS: at 22:02

## 2024-09-03 RX ADMIN — SODIUM CHLORIDE: 9 INJECTION, SOLUTION INTRAVENOUS at 21:57

## 2024-09-03 ASSESSMENT — PAIN DESCRIPTION - LOCATION: LOCATION: GENERALIZED

## 2024-09-03 ASSESSMENT — PAIN - FUNCTIONAL ASSESSMENT: PAIN_FUNCTIONAL_ASSESSMENT: 0-10

## 2024-09-03 ASSESSMENT — PAIN SCALES - GENERAL
PAINLEVEL_OUTOF10: 3
PAINLEVEL_OUTOF10: 10
PAINLEVEL_OUTOF10: 0

## 2024-09-03 ASSESSMENT — PAIN DESCRIPTION - DESCRIPTORS: DESCRIPTORS: ACHING;DISCOMFORT

## 2024-09-04 LAB
ANION GAP SERPL CALC-SCNC: 7 MMOL/L (ref 5–15)
BASOPHILS # BLD: 0 K/UL (ref 0–0.1)
BASOPHILS NFR BLD: 0 % (ref 0–1)
BUN SERPL-MCNC: 52 MG/DL (ref 6–20)
BUN/CREAT SERPL: 17 (ref 12–20)
CA-I BLD-MCNC: 9.5 MG/DL (ref 8.5–10.1)
CHLORIDE SERPL-SCNC: 113 MMOL/L (ref 97–108)
CO2 SERPL-SCNC: 20 MMOL/L (ref 21–32)
CREAT SERPL-MCNC: 3.07 MG/DL (ref 0.7–1.3)
DATE LAST DOSE: NORMAL
DIFFERENTIAL METHOD BLD: ABNORMAL
DOSE AMOUNT: NORMAL UNITS
EKG ATRIAL RATE: 74 BPM
EKG DIAGNOSIS: NORMAL
EKG P AXIS: 71 DEGREES
EKG P-R INTERVAL: 142 MS
EKG Q-T INTERVAL: 434 MS
EKG QRS DURATION: 136 MS
EKG QTC CALCULATION (BAZETT): 481 MS
EKG R AXIS: 126 DEGREES
EKG T AXIS: 13 DEGREES
EKG VENTRICULAR RATE: 74 BPM
EOSINOPHIL # BLD: 0.5 K/UL (ref 0–0.4)
EOSINOPHIL NFR BLD: 7 % (ref 0–7)
ERYTHROCYTE [DISTWIDTH] IN BLOOD BY AUTOMATED COUNT: 21 % (ref 11.5–14.5)
GLUCOSE BLD STRIP.AUTO-MCNC: 143 MG/DL (ref 65–100)
GLUCOSE BLD STRIP.AUTO-MCNC: 149 MG/DL (ref 65–100)
GLUCOSE BLD STRIP.AUTO-MCNC: 158 MG/DL (ref 65–100)
GLUCOSE SERPL-MCNC: 131 MG/DL (ref 65–100)
HCT VFR BLD AUTO: 27.5 % (ref 36.6–50.3)
HGB BLD-MCNC: 8.1 G/DL (ref 12.1–17)
IMM GRANULOCYTES # BLD AUTO: 0 K/UL (ref 0–0.04)
IMM GRANULOCYTES NFR BLD AUTO: 0 % (ref 0–0.5)
LYMPHOCYTES # BLD: 0.9 K/UL (ref 0.8–3.5)
LYMPHOCYTES NFR BLD: 13 % (ref 12–49)
MCH RBC QN AUTO: 30.7 PG (ref 26–34)
MCHC RBC AUTO-ENTMCNC: 29.5 G/DL (ref 30–36.5)
MCV RBC AUTO: 104.2 FL (ref 80–99)
MONOCYTES # BLD: 0.8 K/UL (ref 0–1)
MONOCYTES NFR BLD: 11 % (ref 5–13)
MRSA DNA SPEC QL NAA+PROBE: DETECTED
NEUTS SEG # BLD: 4.8 K/UL (ref 1.8–8)
NEUTS SEG NFR BLD: 69 % (ref 32–75)
NRBC # BLD: 0 K/UL (ref 0–0.01)
NRBC BLD-RTO: 0 PER 100 WBC
PERFORMED BY:: ABNORMAL
PLATELET # BLD AUTO: 171 K/UL (ref 150–400)
PMV BLD AUTO: 11.2 FL (ref 8.9–12.9)
POTASSIUM SERPL-SCNC: 5.2 MMOL/L (ref 3.5–5.1)
RBC # BLD AUTO: 2.64 M/UL (ref 4.1–5.7)
SODIUM SERPL-SCNC: 140 MMOL/L (ref 136–145)
VANCOMYCIN SERPL-MCNC: 9.4 UG/ML
WBC # BLD AUTO: 7 K/UL (ref 4.1–11.1)

## 2024-09-04 PROCEDURE — 2700000000 HC OXYGEN THERAPY PER DAY

## 2024-09-04 PROCEDURE — 05HF33Z INSERTION OF INFUSION DEVICE INTO LEFT CEPHALIC VEIN, PERCUTANEOUS APPROACH: ICD-10-PCS | Performed by: FAMILY MEDICINE

## 2024-09-04 PROCEDURE — 2580000003 HC RX 258

## 2024-09-04 PROCEDURE — 80048 BASIC METABOLIC PNL TOTAL CA: CPT

## 2024-09-04 PROCEDURE — 2580000003 HC RX 258: Performed by: INTERNAL MEDICINE

## 2024-09-04 PROCEDURE — 97530 THERAPEUTIC ACTIVITIES: CPT

## 2024-09-04 PROCEDURE — 94761 N-INVAS EAR/PLS OXIMETRY MLT: CPT

## 2024-09-04 PROCEDURE — 36415 COLL VENOUS BLD VENIPUNCTURE: CPT

## 2024-09-04 PROCEDURE — 6360000002 HC RX W HCPCS: Performed by: INTERNAL MEDICINE

## 2024-09-04 PROCEDURE — 6370000000 HC RX 637 (ALT 250 FOR IP)

## 2024-09-04 PROCEDURE — 82962 GLUCOSE BLOOD TEST: CPT

## 2024-09-04 PROCEDURE — 94640 AIRWAY INHALATION TREATMENT: CPT

## 2024-09-04 PROCEDURE — 6360000002 HC RX W HCPCS: Performed by: FAMILY MEDICINE

## 2024-09-04 PROCEDURE — 36410 VNPNXR 3YR/> PHY/QHP DX/THER: CPT

## 2024-09-04 PROCEDURE — 2060000000 HC ICU INTERMEDIATE R&B

## 2024-09-04 PROCEDURE — 6370000000 HC RX 637 (ALT 250 FOR IP): Performed by: HOSPITALIST

## 2024-09-04 PROCEDURE — 85025 COMPLETE CBC W/AUTO DIFF WBC: CPT

## 2024-09-04 PROCEDURE — 97161 PT EVAL LOW COMPLEX 20 MIN: CPT

## 2024-09-04 PROCEDURE — 80202 ASSAY OF VANCOMYCIN: CPT

## 2024-09-04 PROCEDURE — 6360000002 HC RX W HCPCS

## 2024-09-04 RX ORDER — HEPARIN SODIUM 5000 [USP'U]/ML
5000 INJECTION, SOLUTION INTRAVENOUS; SUBCUTANEOUS EVERY 8 HOURS
Status: DISCONTINUED | OUTPATIENT
Start: 2024-09-04 | End: 2024-09-18 | Stop reason: HOSPADM

## 2024-09-04 RX ORDER — SODIUM CHLORIDE 450 MG/100ML
INJECTION, SOLUTION INTRAVENOUS CONTINUOUS
Status: DISCONTINUED | OUTPATIENT
Start: 2024-09-04 | End: 2024-09-05

## 2024-09-04 RX ADMIN — THERA TABS 1 TABLET: TAB at 08:28

## 2024-09-04 RX ADMIN — Medication 400 UNITS: at 08:38

## 2024-09-04 RX ADMIN — GABAPENTIN 400 MG: 400 CAPSULE ORAL at 23:22

## 2024-09-04 RX ADMIN — ISOSORBIDE DINITRATE 20 MG: 20 TABLET ORAL at 08:28

## 2024-09-04 RX ADMIN — OXYCODONE HYDROCHLORIDE AND ACETAMINOPHEN 1000 MG: 500 TABLET ORAL at 08:28

## 2024-09-04 RX ADMIN — ACETAMINOPHEN 650 MG: 325 TABLET ORAL at 06:41

## 2024-09-04 RX ADMIN — ISOSORBIDE DINITRATE 20 MG: 20 TABLET ORAL at 18:14

## 2024-09-04 RX ADMIN — Medication 2 PUFF: at 09:17

## 2024-09-04 RX ADMIN — TAMSULOSIN HYDROCHLORIDE 0.8 MG: 0.4 CAPSULE ORAL at 08:38

## 2024-09-04 RX ADMIN — CEFEPIME 2000 MG: 2 INJECTION, POWDER, FOR SOLUTION INTRAVENOUS at 15:46

## 2024-09-04 RX ADMIN — OXYCODONE HYDROCHLORIDE AND ACETAMINOPHEN 1000 MG: 500 TABLET ORAL at 23:22

## 2024-09-04 RX ADMIN — Medication 3 MG: at 23:22

## 2024-09-04 RX ADMIN — NYSTATIN 500000 UNITS: 100000 SUSPENSION ORAL at 12:00

## 2024-09-04 RX ADMIN — Medication 400 MG: at 08:27

## 2024-09-04 RX ADMIN — Medication 2 PUFF: at 21:32

## 2024-09-04 RX ADMIN — FLUTICASONE PROPIONATE 2 SPRAY: 50 SPRAY, METERED NASAL at 12:01

## 2024-09-04 RX ADMIN — Medication 1 CAPSULE: at 08:28

## 2024-09-04 RX ADMIN — GABAPENTIN 400 MG: 400 CAPSULE ORAL at 15:40

## 2024-09-04 RX ADMIN — SODIUM CHLORIDE: 4.5 INJECTION, SOLUTION INTRAVENOUS at 15:41

## 2024-09-04 RX ADMIN — OXYCODONE HYDROCHLORIDE 10 MG: 10 TABLET ORAL at 23:22

## 2024-09-04 RX ADMIN — EPOETIN ALFA-EPBX 10000 UNITS: 10000 INJECTION, SOLUTION INTRAVENOUS; SUBCUTANEOUS at 18:16

## 2024-09-04 RX ADMIN — ISOSORBIDE DINITRATE 20 MG: 20 TABLET ORAL at 15:41

## 2024-09-04 RX ADMIN — ASPIRIN 81 MG CHEWABLE TABLET 81 MG: 81 TABLET CHEWABLE at 08:30

## 2024-09-04 RX ADMIN — INSULIN GLARGINE 90 UNITS: 100 INJECTION, SOLUTION SUBCUTANEOUS at 08:26

## 2024-09-04 RX ADMIN — Medication 400 UNITS: at 23:21

## 2024-09-04 RX ADMIN — CALCIUM CARBONATE 500 MG: 500 TABLET, CHEWABLE ORAL at 23:22

## 2024-09-04 RX ADMIN — GABAPENTIN 400 MG: 400 CAPSULE ORAL at 18:14

## 2024-09-04 RX ADMIN — FERROUS SULFATE TAB 325 MG (65 MG ELEMENTAL FE) 325 MG: 325 (65 FE) TAB at 08:29

## 2024-09-04 RX ADMIN — PROPRANOLOL HYDROCHLORIDE 40 MG: 10 TABLET ORAL at 23:22

## 2024-09-04 RX ADMIN — CETIRIZINE HYDROCHLORIDE 10 MG: 10 TABLET, FILM COATED ORAL at 08:26

## 2024-09-04 RX ADMIN — OXYCODONE HYDROCHLORIDE 10 MG: 10 TABLET ORAL at 15:40

## 2024-09-04 RX ADMIN — OXYCODONE HYDROCHLORIDE 10 MG: 10 TABLET ORAL at 08:28

## 2024-09-04 RX ADMIN — HEPARIN SODIUM 5000 UNITS: 5000 INJECTION INTRAVENOUS; SUBCUTANEOUS at 23:19

## 2024-09-04 RX ADMIN — PROPRANOLOL HYDROCHLORIDE 40 MG: 10 TABLET ORAL at 08:28

## 2024-09-04 RX ADMIN — INSULIN GLARGINE 40 UNITS: 100 INJECTION, SOLUTION SUBCUTANEOUS at 23:18

## 2024-09-04 RX ADMIN — ENOXAPARIN SODIUM 40 MG: 100 INJECTION SUBCUTANEOUS at 08:27

## 2024-09-04 RX ADMIN — SODIUM ZIRCONIUM CYCLOSILICATE 10 G: 10 POWDER, FOR SUSPENSION ORAL at 12:00

## 2024-09-04 RX ADMIN — SODIUM CHLORIDE: 9 INJECTION, SOLUTION INTRAVENOUS at 15:45

## 2024-09-04 RX ADMIN — HYOSCYAMINE SULFATE: 16 SOLUTION at 12:00

## 2024-09-04 RX ADMIN — OXYCODONE HYDROCHLORIDE 10 MG: 10 TABLET ORAL at 18:14

## 2024-09-04 RX ADMIN — GABAPENTIN 400 MG: 400 CAPSULE ORAL at 08:28

## 2024-09-04 RX ADMIN — CALCIUM CARBONATE 500 MG: 500 TABLET, CHEWABLE ORAL at 08:27

## 2024-09-04 RX ADMIN — Medication 1 CAPSULE: at 08:27

## 2024-09-04 RX ADMIN — POLYETHYLENE GLYCOL 3350 17 G: 17 POWDER, FOR SOLUTION ORAL at 08:31

## 2024-09-04 RX ADMIN — ZINC SULFATE 220 MG (50 MG) CAPSULE 50 MG: CAPSULE at 08:28

## 2024-09-04 RX ADMIN — PANTOPRAZOLE SODIUM 40 MG: 40 TABLET, DELAYED RELEASE ORAL at 06:36

## 2024-09-04 RX ADMIN — SODIUM CHLORIDE, PRESERVATIVE FREE 10 ML: 5 INJECTION INTRAVENOUS at 08:31

## 2024-09-04 RX ADMIN — SENNOSIDES 8.6 MG: 8.6 TABLET, FILM COATED ORAL at 08:28

## 2024-09-04 RX ADMIN — ATORVASTATIN CALCIUM 40 MG: 40 TABLET, FILM COATED ORAL at 23:22

## 2024-09-04 RX ADMIN — FUROSEMIDE 40 MG: 10 INJECTION, SOLUTION INTRAMUSCULAR; INTRAVENOUS at 08:27

## 2024-09-04 ASSESSMENT — PAIN SCALES - GENERAL
PAINLEVEL_OUTOF10: 5
PAINLEVEL_OUTOF10: 10
PAINLEVEL_OUTOF10: 5
PAINLEVEL_OUTOF10: 10

## 2024-09-04 ASSESSMENT — PAIN DESCRIPTION - LOCATION
LOCATION: OTHER (COMMENT)
LOCATION: GENERALIZED

## 2024-09-04 ASSESSMENT — PAIN DESCRIPTION - DESCRIPTORS: DESCRIPTORS: ACHING

## 2024-09-05 LAB
ALBUMIN SERPL-MCNC: 2.3 G/DL (ref 3.5–5)
ALBUMIN/GLOB SERPL: 0.5 (ref 1.1–2.2)
ALP SERPL-CCNC: 99 U/L (ref 45–117)
ALT SERPL-CCNC: 32 U/L (ref 12–78)
ANION GAP SERPL CALC-SCNC: 4 MMOL/L (ref 2–12)
ARTERIAL PATENCY WRIST A: YES
AST SERPL W P-5'-P-CCNC: 38 U/L (ref 15–37)
BASE DEFICIT BLDA-SCNC: 5.7 MMOL/L
BASOPHILS # BLD: 0 K/UL (ref 0–0.1)
BASOPHILS # BLD: NORMAL K/UL
BASOPHILS NFR BLD: 0 % (ref 0–1)
BASOPHILS NFR BLD: NORMAL %
BDY SITE: ABNORMAL
BILIRUB SERPL-MCNC: 0.5 MG/DL (ref 0.2–1)
BODY TEMPERATURE: 98.6
BUN SERPL-MCNC: 59 MG/DL (ref 6–20)
BUN/CREAT SERPL: 17 (ref 12–20)
CA-I BLD-MCNC: 9.2 MG/DL (ref 8.5–10.1)
CHLORIDE SERPL-SCNC: 110 MMOL/L (ref 97–108)
CO2 SERPL-SCNC: 25 MMOL/L (ref 21–32)
COHGB MFR BLD: 0.5 % (ref 1–2)
CREAT SERPL-MCNC: 3.39 MG/DL (ref 0.7–1.3)
DIFFERENTIAL METHOD BLD: ABNORMAL
DIFFERENTIAL METHOD BLD: NORMAL
EOSINOPHIL # BLD: 0.6 K/UL (ref 0–0.4)
EOSINOPHIL # BLD: NORMAL K/UL
EOSINOPHIL NFR BLD: 9 % (ref 0–7)
EOSINOPHIL NFR BLD: NORMAL %
ERYTHROCYTE [DISTWIDTH] IN BLOOD BY AUTOMATED COUNT: 20.6 % (ref 11.5–14.5)
ERYTHROCYTE [DISTWIDTH] IN BLOOD BY AUTOMATED COUNT: NORMAL %
FIO2 ON VENT: 40 %
GAS FLOW.O2 O2 DELIVERY SYS: 5 L/MIN
GLOBULIN SER CALC-MCNC: 4.5 G/DL (ref 2–4)
GLUCOSE BLD STRIP.AUTO-MCNC: 104 MG/DL (ref 65–100)
GLUCOSE BLD STRIP.AUTO-MCNC: 74 MG/DL (ref 65–100)
GLUCOSE BLD STRIP.AUTO-MCNC: 90 MG/DL (ref 65–100)
GLUCOSE BLD STRIP.AUTO-MCNC: 95 MG/DL (ref 65–100)
GLUCOSE SERPL-MCNC: 83 MG/DL (ref 65–100)
HCO3 BLDA-SCNC: 21 MMOL/L (ref 22–26)
HCT VFR BLD AUTO: 27.5 % (ref 36.6–50.3)
HCT VFR BLD AUTO: NORMAL %
HGB BLD-MCNC: 8.2 G/DL (ref 12.1–17)
HGB BLD-MCNC: NORMAL G/DL
IMM GRANULOCYTES # BLD AUTO: 0 K/UL (ref 0–0.04)
IMM GRANULOCYTES # BLD AUTO: NORMAL K/UL
IMM GRANULOCYTES NFR BLD AUTO: 0 % (ref 0–0.5)
IMM GRANULOCYTES NFR BLD AUTO: NORMAL %
LYMPHOCYTES # BLD: 1.1 K/UL (ref 0.8–3.5)
LYMPHOCYTES # BLD: NORMAL K/UL
LYMPHOCYTES NFR BLD: 15 % (ref 12–49)
LYMPHOCYTES NFR BLD: NORMAL %
MCH RBC QN AUTO: 30 PG (ref 26–34)
MCH RBC QN AUTO: NORMAL PG
MCHC RBC AUTO-ENTMCNC: 29.8 G/DL (ref 30–36.5)
MCHC RBC AUTO-ENTMCNC: NORMAL G/DL
MCV RBC AUTO: 100.7 FL (ref 80–99)
MCV RBC AUTO: NORMAL FL
METHGB MFR BLD: 0.3 % (ref 0–1.4)
MONOCYTES # BLD: 0.9 K/UL (ref 0–1)
MONOCYTES # BLD: NORMAL K/UL
MONOCYTES NFR BLD: 13 % (ref 5–13)
MONOCYTES NFR BLD: NORMAL %
NEUTS SEG # BLD: 4.5 K/UL (ref 1.8–8)
NEUTS SEG # BLD: NORMAL K/UL
NEUTS SEG NFR BLD: 63 % (ref 32–75)
NEUTS SEG NFR BLD: NORMAL %
NRBC # BLD: 0 K/UL (ref 0–0.01)
NRBC # BLD: NORMAL K/UL
NRBC BLD-RTO: 0 PER 100 WBC
NRBC BLD-RTO: NORMAL PER 100 WBC
OXYHGB MFR BLD: 95.2 % (ref 95–99)
PCO2 BLDA: 46 MMHG (ref 35–45)
PERFORMED BY:: ABNORMAL
PERFORMED BY:: ABNORMAL
PERFORMED BY:: NORMAL
PH BLDA: 7.28 (ref 7.35–7.45)
PLATELET # BLD AUTO: 219 K/UL (ref 150–400)
PLATELET # BLD AUTO: NORMAL K/UL
PMV BLD AUTO: 11 FL (ref 8.9–12.9)
PMV BLD AUTO: NORMAL FL
PO2 BLDA: 92 MMHG (ref 80–100)
POTASSIUM SERPL-SCNC: 4.9 MMOL/L (ref 3.5–5.1)
PROT SERPL-MCNC: 6.8 G/DL (ref 6.4–8.2)
RBC # BLD AUTO: 2.73 M/UL (ref 4.1–5.7)
RBC # BLD AUTO: NORMAL M/UL
SAO2 % BLD: 96 % (ref 95–99)
SAO2% DEVICE SAO2% SENSOR NAME: ABNORMAL
SODIUM SERPL-SCNC: 139 MMOL/L (ref 136–145)
SPECIMEN SITE: ABNORMAL
WBC # BLD AUTO: 7.1 K/UL (ref 4.1–11.1)
WBC # BLD AUTO: NORMAL K/UL

## 2024-09-05 PROCEDURE — 6360000002 HC RX W HCPCS

## 2024-09-05 PROCEDURE — 94640 AIRWAY INHALATION TREATMENT: CPT

## 2024-09-05 PROCEDURE — 94761 N-INVAS EAR/PLS OXIMETRY MLT: CPT

## 2024-09-05 PROCEDURE — 6370000000 HC RX 637 (ALT 250 FOR IP): Performed by: INTERNAL MEDICINE

## 2024-09-05 PROCEDURE — 36415 COLL VENOUS BLD VENIPUNCTURE: CPT

## 2024-09-05 PROCEDURE — 2000000000 HC ICU R&B

## 2024-09-05 PROCEDURE — 36600 WITHDRAWAL OF ARTERIAL BLOOD: CPT

## 2024-09-05 PROCEDURE — 2700000000 HC OXYGEN THERAPY PER DAY

## 2024-09-05 PROCEDURE — 2580000003 HC RX 258

## 2024-09-05 PROCEDURE — 85025 COMPLETE CBC W/AUTO DIFF WBC: CPT

## 2024-09-05 PROCEDURE — 6360000002 HC RX W HCPCS: Performed by: FAMILY MEDICINE

## 2024-09-05 PROCEDURE — 99222 1ST HOSP IP/OBS MODERATE 55: CPT | Performed by: INTERNAL MEDICINE

## 2024-09-05 PROCEDURE — 6360000002 HC RX W HCPCS: Performed by: INTERNAL MEDICINE

## 2024-09-05 PROCEDURE — P9047 ALBUMIN (HUMAN), 25%, 50ML: HCPCS | Performed by: FAMILY MEDICINE

## 2024-09-05 PROCEDURE — 31720 CLEARANCE OF AIRWAYS: CPT

## 2024-09-05 PROCEDURE — 80053 COMPREHEN METABOLIC PANEL: CPT

## 2024-09-05 PROCEDURE — 2580000003 HC RX 258: Performed by: INTERNAL MEDICINE

## 2024-09-05 PROCEDURE — 82803 BLOOD GASES ANY COMBINATION: CPT

## 2024-09-05 PROCEDURE — 6370000000 HC RX 637 (ALT 250 FOR IP)

## 2024-09-05 PROCEDURE — 82962 GLUCOSE BLOOD TEST: CPT

## 2024-09-05 PROCEDURE — 2500000003 HC RX 250 WO HCPCS: Performed by: FAMILY MEDICINE

## 2024-09-05 RX ORDER — HYDRALAZINE HYDROCHLORIDE 50 MG/1
25 TABLET, FILM COATED ORAL 3 TIMES DAILY
Status: DISCONTINUED | OUTPATIENT
Start: 2024-09-05 | End: 2024-09-05

## 2024-09-05 RX ORDER — NALOXONE HYDROCHLORIDE 0.4 MG/ML
0.4 INJECTION, SOLUTION INTRAMUSCULAR; INTRAVENOUS; SUBCUTANEOUS PRN
Status: DISCONTINUED | OUTPATIENT
Start: 2024-09-05 | End: 2024-09-18 | Stop reason: HOSPADM

## 2024-09-05 RX ORDER — MIDODRINE HYDROCHLORIDE 5 MG/1
5 TABLET ORAL 3 TIMES DAILY
Status: DISCONTINUED | OUTPATIENT
Start: 2024-09-05 | End: 2024-09-05 | Stop reason: SDUPTHER

## 2024-09-05 RX ORDER — MIDODRINE HYDROCHLORIDE 5 MG/1
5 TABLET ORAL
Status: DISCONTINUED | OUTPATIENT
Start: 2024-09-05 | End: 2024-09-06

## 2024-09-05 RX ORDER — HYDRALAZINE HYDROCHLORIDE 25 MG/1
25 TABLET, FILM COATED ORAL 2 TIMES DAILY
Status: DISCONTINUED | OUTPATIENT
Start: 2024-09-05 | End: 2024-09-06

## 2024-09-05 RX ORDER — FUROSEMIDE 10 MG/ML
80 INJECTION INTRAMUSCULAR; INTRAVENOUS 3 TIMES DAILY
Status: DISCONTINUED | OUTPATIENT
Start: 2024-09-05 | End: 2024-09-06

## 2024-09-05 RX ORDER — ISOSORBIDE DINITRATE 20 MG/1
20 TABLET ORAL 2 TIMES DAILY
Status: DISCONTINUED | OUTPATIENT
Start: 2024-09-05 | End: 2024-09-18 | Stop reason: HOSPADM

## 2024-09-05 RX ORDER — MUPIROCIN 20 MG/G
OINTMENT TOPICAL 2 TIMES DAILY
Status: DISPENSED | OUTPATIENT
Start: 2024-09-05 | End: 2024-09-10

## 2024-09-05 RX ORDER — ALBUMIN (HUMAN) 12.5 G/50ML
25 SOLUTION INTRAVENOUS ONCE
Status: COMPLETED | OUTPATIENT
Start: 2024-09-05 | End: 2024-09-05

## 2024-09-05 RX ORDER — CARVEDILOL 3.12 MG/1
6.25 TABLET ORAL 2 TIMES DAILY WITH MEALS
Status: DISCONTINUED | OUTPATIENT
Start: 2024-09-05 | End: 2024-09-18 | Stop reason: HOSPADM

## 2024-09-05 RX ORDER — CLOTRIMAZOLE 10 MG/1
10 LOZENGE ORAL
Status: DISCONTINUED | OUTPATIENT
Start: 2024-09-06 | End: 2024-09-18 | Stop reason: HOSPADM

## 2024-09-05 RX ADMIN — MIDODRINE HYDROCHLORIDE 5 MG: 5 TABLET ORAL at 09:30

## 2024-09-05 RX ADMIN — Medication 2 PUFF: at 07:57

## 2024-09-05 RX ADMIN — Medication 2 PUFF: at 19:56

## 2024-09-05 RX ADMIN — GABAPENTIN 400 MG: 400 CAPSULE ORAL at 13:07

## 2024-09-05 RX ADMIN — CETIRIZINE HYDROCHLORIDE 10 MG: 10 TABLET, FILM COATED ORAL at 13:12

## 2024-09-05 RX ADMIN — FLUTICASONE PROPIONATE 2 SPRAY: 50 SPRAY, METERED NASAL at 11:11

## 2024-09-05 RX ADMIN — SODIUM CHLORIDE: 4.5 INJECTION, SOLUTION INTRAVENOUS at 03:23

## 2024-09-05 RX ADMIN — HYDRALAZINE HYDROCHLORIDE 25 MG: 50 TABLET ORAL at 10:22

## 2024-09-05 RX ADMIN — GABAPENTIN 400 MG: 400 CAPSULE ORAL at 09:30

## 2024-09-05 RX ADMIN — FUROSEMIDE 80 MG: 10 INJECTION, SOLUTION INTRAMUSCULAR; INTRAVENOUS at 23:55

## 2024-09-05 RX ADMIN — THERA TABS 1 TABLET: TAB at 10:19

## 2024-09-05 RX ADMIN — OXYCODONE HYDROCHLORIDE AND ACETAMINOPHEN 1000 MG: 500 TABLET ORAL at 10:24

## 2024-09-05 RX ADMIN — INSULIN GLARGINE 90 UNITS: 100 INJECTION, SOLUTION SUBCUTANEOUS at 09:30

## 2024-09-05 RX ADMIN — VANCOMYCIN HYDROCHLORIDE 2000 MG: 1 INJECTION, POWDER, LYOPHILIZED, FOR SOLUTION INTRAVENOUS at 03:29

## 2024-09-05 RX ADMIN — Medication 1 CAPSULE: at 10:23

## 2024-09-05 RX ADMIN — SODIUM CHLORIDE: 9 INJECTION, SOLUTION INTRAVENOUS at 03:28

## 2024-09-05 RX ADMIN — POLYETHYLENE GLYCOL 3350 17 G: 17 POWDER, FOR SOLUTION ORAL at 10:26

## 2024-09-05 RX ADMIN — CEFEPIME 2000 MG: 2 INJECTION, POWDER, FOR SOLUTION INTRAVENOUS at 15:22

## 2024-09-05 RX ADMIN — NYSTATIN 500000 UNITS: 100000 SUSPENSION ORAL at 09:30

## 2024-09-05 RX ADMIN — CARVEDILOL 6.25 MG: 3.12 TABLET, FILM COATED ORAL at 11:37

## 2024-09-05 RX ADMIN — MIDODRINE HYDROCHLORIDE 5 MG: 5 TABLET ORAL at 17:48

## 2024-09-05 RX ADMIN — HYOSCYAMINE SULFATE: 16 SOLUTION at 10:29

## 2024-09-05 RX ADMIN — Medication 1 CAPSULE: at 09:30

## 2024-09-05 RX ADMIN — HEPARIN SODIUM 5000 UNITS: 5000 INJECTION INTRAVENOUS; SUBCUTANEOUS at 06:27

## 2024-09-05 RX ADMIN — FUROSEMIDE 80 MG: 10 INJECTION, SOLUTION INTRAMUSCULAR; INTRAVENOUS at 14:53

## 2024-09-05 RX ADMIN — NYSTATIN 500000 UNITS: 100000 SUSPENSION ORAL at 14:00

## 2024-09-05 RX ADMIN — WHITE PETROLATUM,ZINC OXIDE: 57; 17 PASTE TOPICAL at 14:00

## 2024-09-05 RX ADMIN — SODIUM CHLORIDE, PRESERVATIVE FREE 10 ML: 5 INJECTION INTRAVENOUS at 10:25

## 2024-09-05 RX ADMIN — OXYCODONE HYDROCHLORIDE 10 MG: 10 TABLET ORAL at 10:20

## 2024-09-05 RX ADMIN — GABAPENTIN 400 MG: 400 CAPSULE ORAL at 17:47

## 2024-09-05 RX ADMIN — OXYCODONE HYDROCHLORIDE 10 MG: 10 TABLET ORAL at 14:30

## 2024-09-05 RX ADMIN — FERROUS SULFATE TAB 325 MG (65 MG ELEMENTAL FE) 325 MG: 325 (65 FE) TAB at 10:23

## 2024-09-05 RX ADMIN — FUROSEMIDE 80 MG: 10 INJECTION, SOLUTION INTRAMUSCULAR; INTRAVENOUS at 10:25

## 2024-09-05 RX ADMIN — Medication 400 MG: at 09:30

## 2024-09-05 RX ADMIN — CARVEDILOL 6.25 MG: 3.12 TABLET, FILM COATED ORAL at 17:45

## 2024-09-05 RX ADMIN — ASPIRIN 81 MG CHEWABLE TABLET 81 MG: 81 TABLET CHEWABLE at 10:23

## 2024-09-05 RX ADMIN — ZINC SULFATE 220 MG (50 MG) CAPSULE 50 MG: CAPSULE at 10:19

## 2024-09-05 RX ADMIN — NALOXONE HYDROCHLORIDE 0.4 MG: 0.4 INJECTION, SOLUTION INTRAMUSCULAR; INTRAVENOUS; SUBCUTANEOUS at 19:06

## 2024-09-05 RX ADMIN — Medication 400 UNITS: at 10:19

## 2024-09-05 RX ADMIN — CALCIUM CARBONATE 500 MG: 500 TABLET, CHEWABLE ORAL at 10:23

## 2024-09-05 RX ADMIN — TAMSULOSIN HYDROCHLORIDE 0.8 MG: 0.4 CAPSULE ORAL at 10:28

## 2024-09-05 RX ADMIN — SENNOSIDES 8.6 MG: 8.6 TABLET, FILM COATED ORAL at 10:27

## 2024-09-05 RX ADMIN — HEPARIN SODIUM 5000 UNITS: 5000 INJECTION INTRAVENOUS; SUBCUTANEOUS at 13:09

## 2024-09-05 RX ADMIN — ALBUMIN (HUMAN) 25 G: 0.25 INJECTION, SOLUTION INTRAVENOUS at 11:50

## 2024-09-05 ASSESSMENT — PAIN DESCRIPTION - ORIENTATION
ORIENTATION: RIGHT;LEFT
ORIENTATION: RIGHT;LEFT

## 2024-09-05 ASSESSMENT — PAIN DESCRIPTION - DESCRIPTORS
DESCRIPTORS: ACHING
DESCRIPTORS: ACHING

## 2024-09-05 ASSESSMENT — PAIN SCALES - GENERAL
PAINLEVEL_OUTOF10: 8
PAINLEVEL_OUTOF10: 4

## 2024-09-05 ASSESSMENT — PAIN SCALES - WONG BAKER: WONGBAKER_NUMERICALRESPONSE: HURTS A LITTLE BIT

## 2024-09-06 ENCOUNTER — APPOINTMENT (OUTPATIENT)
Facility: HOSPITAL | Age: 69
DRG: 291 | End: 2024-09-06
Payer: MEDICARE

## 2024-09-06 LAB
ALBUMIN SERPL-MCNC: 2.2 G/DL (ref 3.5–5)
ALBUMIN SERPL-MCNC: 2.2 G/DL (ref 3.5–5)
ALBUMIN/GLOB SERPL: 0.5 (ref 1.1–2.2)
ALP SERPL-CCNC: 92 U/L (ref 45–117)
ALT SERPL-CCNC: 28 U/L (ref 12–78)
ANION GAP SERPL CALC-SCNC: 8 MMOL/L (ref 2–12)
ANION GAP SERPL CALC-SCNC: 8 MMOL/L (ref 2–12)
ARTERIAL PATENCY WRIST A: YES
AST SERPL W P-5'-P-CCNC: 46 U/L (ref 15–37)
BACTERIA SPEC CULT: ABNORMAL
BASE DEFICIT BLDA-SCNC: 4.4 MMOL/L
BASOPHILS # BLD: 0 K/UL (ref 0–0.1)
BASOPHILS NFR BLD: 0 % (ref 0–1)
BDY SITE: ABNORMAL
BILIRUB SERPL-MCNC: 0.4 MG/DL (ref 0.2–1)
BNP SERPL-MCNC: 3995 PG/ML
BODY TEMPERATURE: 97.6
BUN SERPL-MCNC: 58 MG/DL (ref 6–20)
BUN SERPL-MCNC: 63 MG/DL (ref 6–20)
BUN/CREAT SERPL: 15 (ref 12–20)
BUN/CREAT SERPL: 16 (ref 12–20)
CA-I BLD-MCNC: 1.22 MMOL/L (ref 1.13–1.32)
CA-I BLD-MCNC: 9 MG/DL (ref 8.5–10.1)
CA-I BLD-MCNC: 9.2 MG/DL (ref 8.5–10.1)
CHLORIDE SERPL-SCNC: 111 MMOL/L (ref 97–108)
CHLORIDE SERPL-SCNC: 112 MMOL/L (ref 97–108)
CO2 SERPL-SCNC: 23 MMOL/L (ref 21–32)
CO2 SERPL-SCNC: 23 MMOL/L (ref 21–32)
COHGB MFR BLD: 0.4 % (ref 1–2)
CREAT SERPL-MCNC: 3.83 MG/DL (ref 0.7–1.3)
CREAT SERPL-MCNC: 4 MG/DL (ref 0.7–1.3)
DATE LAST DOSE: NORMAL
DIFFERENTIAL METHOD BLD: ABNORMAL
DOSE AMOUNT: NORMAL UNITS
EOSINOPHIL # BLD: 0.4 K/UL (ref 0–0.4)
EOSINOPHIL NFR BLD: 7 % (ref 0–7)
ERYTHROCYTE [DISTWIDTH] IN BLOOD BY AUTOMATED COUNT: 20.6 % (ref 11.5–14.5)
FIO2 ON VENT: 32 %
GAS FLOW.O2 O2 DELIVERY SYS: 3 L/MIN
GLOBULIN SER CALC-MCNC: 4.3 G/DL (ref 2–4)
GLUCOSE BLD STRIP.AUTO-MCNC: 107 MG/DL (ref 65–100)
GLUCOSE BLD STRIP.AUTO-MCNC: 127 MG/DL (ref 65–100)
GLUCOSE BLD STRIP.AUTO-MCNC: 130 MG/DL (ref 65–100)
GLUCOSE BLD STRIP.AUTO-MCNC: 134 MG/DL (ref 65–100)
GLUCOSE BLD STRIP.AUTO-MCNC: 76 MG/DL (ref 65–100)
GLUCOSE BLD STRIP.AUTO-MCNC: 94 MG/DL (ref 65–100)
GLUCOSE SERPL-MCNC: 107 MG/DL (ref 65–100)
GLUCOSE SERPL-MCNC: 60 MG/DL (ref 65–100)
GRAM STN SPEC: ABNORMAL
HCO3 BLDA-SCNC: 21 MMOL/L (ref 22–26)
HCT VFR BLD AUTO: 24.1 % (ref 36.6–50.3)
HGB BLD-MCNC: 7.2 G/DL (ref 12.1–17)
IMM GRANULOCYTES # BLD AUTO: 0 K/UL (ref 0–0.04)
IMM GRANULOCYTES NFR BLD AUTO: 0 % (ref 0–0.5)
LYMPHOCYTES # BLD: 1 K/UL (ref 0.8–3.5)
LYMPHOCYTES NFR BLD: 18 % (ref 12–49)
Lab: ABNORMAL
MCH RBC QN AUTO: 30.4 PG (ref 26–34)
MCHC RBC AUTO-ENTMCNC: 29.9 G/DL (ref 30–36.5)
MCV RBC AUTO: 101.7 FL (ref 80–99)
METHGB MFR BLD: 0.1 % (ref 0–1.4)
MONOCYTES # BLD: 0.7 K/UL (ref 0–1)
MONOCYTES NFR BLD: 13 % (ref 5–13)
NEUTS SEG # BLD: 3.3 K/UL (ref 1.8–8)
NEUTS SEG NFR BLD: 62 % (ref 32–75)
NRBC # BLD: 0 K/UL (ref 0–0.01)
NRBC BLD-RTO: 0 PER 100 WBC
OXYHGB MFR BLD: 96.7 % (ref 95–99)
PCO2 BLDA: 40 MMHG (ref 35–45)
PERFORMED BY:: ABNORMAL
PERFORMED BY:: NORMAL
PERFORMED BY:: NORMAL
PH BLDA: 7.34 (ref 7.35–7.45)
PHOSPHATE SERPL-MCNC: 4.6 MG/DL (ref 2.6–4.7)
PHOSPHATE SERPL-MCNC: 4.8 MG/DL (ref 2.6–4.7)
PLATELET # BLD AUTO: 190 K/UL (ref 150–400)
PMV BLD AUTO: 11.1 FL (ref 8.9–12.9)
PO2 BLDA: 101 MMHG (ref 80–100)
POTASSIUM SERPL-SCNC: 4.8 MMOL/L (ref 3.5–5.1)
POTASSIUM SERPL-SCNC: 4.8 MMOL/L (ref 3.5–5.1)
PROT SERPL-MCNC: 6.5 G/DL (ref 6.4–8.2)
RBC # BLD AUTO: 2.37 M/UL (ref 4.1–5.7)
SAO2 % BLD: 97 % (ref 95–99)
SAO2% DEVICE SAO2% SENSOR NAME: ABNORMAL
SODIUM SERPL-SCNC: 142 MMOL/L (ref 136–145)
SODIUM SERPL-SCNC: 143 MMOL/L (ref 136–145)
SPECIMEN SITE: ABNORMAL
VANCOMYCIN SERPL-MCNC: 19.2 UG/ML
VENTILATION MODE VENT: ABNORMAL
WBC # BLD AUTO: 5.4 K/UL (ref 4.1–11.1)

## 2024-09-06 PROCEDURE — 6370000000 HC RX 637 (ALT 250 FOR IP): Performed by: INTERNAL MEDICINE

## 2024-09-06 PROCEDURE — 6370000000 HC RX 637 (ALT 250 FOR IP)

## 2024-09-06 PROCEDURE — 2000000000 HC ICU R&B

## 2024-09-06 PROCEDURE — 94761 N-INVAS EAR/PLS OXIMETRY MLT: CPT

## 2024-09-06 PROCEDURE — 6370000000 HC RX 637 (ALT 250 FOR IP): Performed by: HOSPITALIST

## 2024-09-06 PROCEDURE — 99232 SBSQ HOSP IP/OBS MODERATE 35: CPT | Performed by: INTERNAL MEDICINE

## 2024-09-06 PROCEDURE — 82962 GLUCOSE BLOOD TEST: CPT

## 2024-09-06 PROCEDURE — 80202 ASSAY OF VANCOMYCIN: CPT

## 2024-09-06 PROCEDURE — 80053 COMPREHEN METABOLIC PANEL: CPT

## 2024-09-06 PROCEDURE — 6360000002 HC RX W HCPCS: Performed by: FAMILY MEDICINE

## 2024-09-06 PROCEDURE — 36410 VNPNXR 3YR/> PHY/QHP DX/THER: CPT

## 2024-09-06 PROCEDURE — 6370000000 HC RX 637 (ALT 250 FOR IP): Performed by: FAMILY MEDICINE

## 2024-09-06 PROCEDURE — 84100 ASSAY OF PHOSPHORUS: CPT

## 2024-09-06 PROCEDURE — 2700000000 HC OXYGEN THERAPY PER DAY

## 2024-09-06 PROCEDURE — 36600 WITHDRAWAL OF ARTERIAL BLOOD: CPT

## 2024-09-06 PROCEDURE — 6360000002 HC RX W HCPCS: Performed by: INTERNAL MEDICINE

## 2024-09-06 PROCEDURE — 82330 ASSAY OF CALCIUM: CPT

## 2024-09-06 PROCEDURE — 82803 BLOOD GASES ANY COMBINATION: CPT

## 2024-09-06 PROCEDURE — 05HY33Z INSERTION OF INFUSION DEVICE INTO UPPER VEIN, PERCUTANEOUS APPROACH: ICD-10-PCS | Performed by: FAMILY MEDICINE

## 2024-09-06 PROCEDURE — 2580000003 HC RX 258

## 2024-09-06 PROCEDURE — 36415 COLL VENOUS BLD VENIPUNCTURE: CPT

## 2024-09-06 PROCEDURE — 71045 X-RAY EXAM CHEST 1 VIEW: CPT

## 2024-09-06 PROCEDURE — 51798 US URINE CAPACITY MEASURE: CPT

## 2024-09-06 PROCEDURE — 94640 AIRWAY INHALATION TREATMENT: CPT

## 2024-09-06 PROCEDURE — 85025 COMPLETE CBC W/AUTO DIFF WBC: CPT

## 2024-09-06 PROCEDURE — 83880 ASSAY OF NATRIURETIC PEPTIDE: CPT

## 2024-09-06 PROCEDURE — 2580000003 HC RX 258: Performed by: INTERNAL MEDICINE

## 2024-09-06 PROCEDURE — 80069 RENAL FUNCTION PANEL: CPT

## 2024-09-06 RX ORDER — MIDODRINE HYDROCHLORIDE 5 MG/1
10 TABLET ORAL
Status: DISCONTINUED | OUTPATIENT
Start: 2024-09-06 | End: 2024-09-18 | Stop reason: HOSPADM

## 2024-09-06 RX ORDER — GABAPENTIN 300 MG/1
300 CAPSULE ORAL 2 TIMES DAILY
Status: DISCONTINUED | OUTPATIENT
Start: 2024-09-06 | End: 2024-09-08

## 2024-09-06 RX ORDER — FUROSEMIDE 10 MG/ML
60 INJECTION INTRAMUSCULAR; INTRAVENOUS 3 TIMES DAILY
Status: DISCONTINUED | OUTPATIENT
Start: 2024-09-06 | End: 2024-09-06

## 2024-09-06 RX ORDER — FUROSEMIDE 10 MG/ML
40 INJECTION INTRAMUSCULAR; INTRAVENOUS 3 TIMES DAILY
Status: DISCONTINUED | OUTPATIENT
Start: 2024-09-06 | End: 2024-09-07

## 2024-09-06 RX ADMIN — HEPARIN SODIUM 5000 UNITS: 5000 INJECTION INTRAVENOUS; SUBCUTANEOUS at 01:41

## 2024-09-06 RX ADMIN — OXYCODONE HYDROCHLORIDE AND ACETAMINOPHEN 1000 MG: 500 TABLET ORAL at 10:01

## 2024-09-06 RX ADMIN — CLOTRIMAZOLE 10 MG: 10 LOZENGE ORAL at 09:59

## 2024-09-06 RX ADMIN — SODIUM CHLORIDE, PRESERVATIVE FREE 10 ML: 5 INJECTION INTRAVENOUS at 10:11

## 2024-09-06 RX ADMIN — SODIUM CHLORIDE, PRESERVATIVE FREE 10 ML: 5 INJECTION INTRAVENOUS at 20:41

## 2024-09-06 RX ADMIN — OXYCODONE HYDROCHLORIDE AND ACETAMINOPHEN 1000 MG: 500 TABLET ORAL at 21:15

## 2024-09-06 RX ADMIN — ZINC SULFATE 220 MG (50 MG) CAPSULE 50 MG: CAPSULE at 13:53

## 2024-09-06 RX ADMIN — EPOETIN ALFA-EPBX 10000 UNITS: 10000 INJECTION, SOLUTION INTRAVENOUS; SUBCUTANEOUS at 20:41

## 2024-09-06 RX ADMIN — MIDODRINE HYDROCHLORIDE 10 MG: 5 TABLET ORAL at 18:49

## 2024-09-06 RX ADMIN — MIDODRINE HYDROCHLORIDE 10 MG: 5 TABLET ORAL at 14:11

## 2024-09-06 RX ADMIN — CALCIUM CARBONATE 500 MG: 500 TABLET, CHEWABLE ORAL at 10:03

## 2024-09-06 RX ADMIN — Medication 400 UNITS: at 10:12

## 2024-09-06 RX ADMIN — FLUTICASONE PROPIONATE 2 SPRAY: 50 SPRAY, METERED NASAL at 13:46

## 2024-09-06 RX ADMIN — CLOTRIMAZOLE 10 MG: 10 LOZENGE ORAL at 14:10

## 2024-09-06 RX ADMIN — FUROSEMIDE 40 MG: 10 INJECTION, SOLUTION INTRAMUSCULAR; INTRAVENOUS at 21:06

## 2024-09-06 RX ADMIN — HEPARIN SODIUM 5000 UNITS: 5000 INJECTION INTRAVENOUS; SUBCUTANEOUS at 13:47

## 2024-09-06 RX ADMIN — WHITE PETROLATUM,ZINC OXIDE: 57; 17 PASTE TOPICAL at 09:46

## 2024-09-06 RX ADMIN — CARVEDILOL 6.25 MG: 3.12 TABLET, FILM COATED ORAL at 18:48

## 2024-09-06 RX ADMIN — Medication 1 CAPSULE: at 10:09

## 2024-09-06 RX ADMIN — CALCIUM CARBONATE 500 MG: 500 TABLET, CHEWABLE ORAL at 21:15

## 2024-09-06 RX ADMIN — GABAPENTIN 300 MG: 300 CAPSULE ORAL at 21:16

## 2024-09-06 RX ADMIN — Medication 400 UNITS: at 21:15

## 2024-09-06 RX ADMIN — Medication 2 PUFF: at 06:46

## 2024-09-06 RX ADMIN — SENNOSIDES 8.6 MG: 8.6 TABLET, FILM COATED ORAL at 10:11

## 2024-09-06 RX ADMIN — ATORVASTATIN CALCIUM 40 MG: 40 TABLET, FILM COATED ORAL at 21:14

## 2024-09-06 RX ADMIN — MUPIROCIN: 20 OINTMENT TOPICAL at 21:05

## 2024-09-06 RX ADMIN — ACETAMINOPHEN 650 MG: 325 TABLET ORAL at 15:16

## 2024-09-06 RX ADMIN — CLOTRIMAZOLE 10 MG: 10 LOZENGE ORAL at 18:49

## 2024-09-06 RX ADMIN — PANTOPRAZOLE SODIUM 40 MG: 40 TABLET, DELAYED RELEASE ORAL at 09:44

## 2024-09-06 RX ADMIN — MUPIROCIN: 20 OINTMENT TOPICAL at 10:10

## 2024-09-06 RX ADMIN — CARVEDILOL 6.25 MG: 3.12 TABLET, FILM COATED ORAL at 10:03

## 2024-09-06 RX ADMIN — DEXTROSE MONOHYDRATE 250 ML: 100 INJECTION, SOLUTION INTRAVENOUS at 04:31

## 2024-09-06 RX ADMIN — ASPIRIN 81 MG CHEWABLE TABLET 81 MG: 81 TABLET CHEWABLE at 10:02

## 2024-09-06 RX ADMIN — WHITE PETROLATUM,ZINC OXIDE: 57; 17 PASTE TOPICAL at 20:42

## 2024-09-06 RX ADMIN — FERROUS SULFATE TAB 325 MG (65 MG ELEMENTAL FE) 325 MG: 325 (65 FE) TAB at 10:06

## 2024-09-06 RX ADMIN — POLYETHYLENE GLYCOL 3350 17 G: 17 POWDER, FOR SOLUTION ORAL at 10:11

## 2024-09-06 RX ADMIN — ACETAMINOPHEN 650 MG: 325 TABLET ORAL at 21:14

## 2024-09-06 RX ADMIN — CEFEPIME 2000 MG: 2 INJECTION, POWDER, FOR SOLUTION INTRAVENOUS at 16:08

## 2024-09-06 RX ADMIN — Medication 2 PUFF: at 19:51

## 2024-09-06 RX ADMIN — Medication 3 MG: at 21:15

## 2024-09-06 RX ADMIN — THERA TABS 1 TABLET: TAB at 10:10

## 2024-09-06 RX ADMIN — GABAPENTIN 300 MG: 300 CAPSULE ORAL at 10:08

## 2024-09-06 RX ADMIN — CLOTRIMAZOLE 10 MG: 10 LOZENGE ORAL at 21:18

## 2024-09-06 RX ADMIN — SODIUM CHLORIDE: 9 INJECTION, SOLUTION INTRAVENOUS at 16:04

## 2024-09-06 RX ADMIN — HEPARIN SODIUM 5000 UNITS: 5000 INJECTION INTRAVENOUS; SUBCUTANEOUS at 02:52

## 2024-09-06 RX ADMIN — Medication 1 CAPSULE: at 13:46

## 2024-09-06 RX ADMIN — TAMSULOSIN HYDROCHLORIDE 0.8 MG: 0.4 CAPSULE ORAL at 10:12

## 2024-09-06 RX ADMIN — HEPARIN SODIUM 5000 UNITS: 5000 INJECTION INTRAVENOUS; SUBCUTANEOUS at 21:06

## 2024-09-06 RX ADMIN — FUROSEMIDE 60 MG: 10 INJECTION, SOLUTION INTRAMUSCULAR; INTRAVENOUS at 10:07

## 2024-09-06 RX ADMIN — MIDODRINE HYDROCHLORIDE 10 MG: 5 TABLET ORAL at 10:09

## 2024-09-06 RX ADMIN — CETIRIZINE HYDROCHLORIDE 10 MG: 10 TABLET, FILM COATED ORAL at 10:05

## 2024-09-06 RX ADMIN — FUROSEMIDE 60 MG: 10 INJECTION, SOLUTION INTRAMUSCULAR; INTRAVENOUS at 15:51

## 2024-09-06 ASSESSMENT — PAIN SCALES - GENERAL
PAINLEVEL_OUTOF10: 10
PAINLEVEL_OUTOF10: 0
PAINLEVEL_OUTOF10: 3
PAINLEVEL_OUTOF10: 0
PAINLEVEL_OUTOF10: 0

## 2024-09-06 ASSESSMENT — PAIN DESCRIPTION - PAIN TYPE: TYPE: ACUTE PAIN

## 2024-09-06 ASSESSMENT — PAIN SCALES - WONG BAKER: WONGBAKER_NUMERICALRESPONSE: NO HURT

## 2024-09-06 ASSESSMENT — PAIN DESCRIPTION - LOCATION
LOCATION: BACK;LEG
LOCATION: CHEST

## 2024-09-06 ASSESSMENT — PAIN DESCRIPTION - ORIENTATION: ORIENTATION: ANTERIOR

## 2024-09-06 ASSESSMENT — PAIN DESCRIPTION - FREQUENCY: FREQUENCY: INTERMITTENT

## 2024-09-06 ASSESSMENT — PAIN DESCRIPTION - DESCRIPTORS: DESCRIPTORS: PRESSURE

## 2024-09-06 ASSESSMENT — PAIN DESCRIPTION - ONSET: ONSET: PROGRESSIVE

## 2024-09-06 ASSESSMENT — PAIN - FUNCTIONAL ASSESSMENT: PAIN_FUNCTIONAL_ASSESSMENT: ACTIVITIES ARE NOT PREVENTED

## 2024-09-07 ENCOUNTER — APPOINTMENT (OUTPATIENT)
Facility: HOSPITAL | Age: 69
DRG: 291 | End: 2024-09-07
Payer: MEDICARE

## 2024-09-07 LAB
ALBUMIN SERPL-MCNC: 2.2 G/DL (ref 3.5–5)
ALBUMIN/GLOB SERPL: 0.5 (ref 1.1–2.2)
ALP SERPL-CCNC: 90 U/L (ref 45–117)
ALT SERPL-CCNC: 30 U/L (ref 12–78)
AMMONIA PLAS-SCNC: 25 UMOL/L
ANION GAP SERPL CALC-SCNC: 5 MMOL/L (ref 2–12)
ARTERIAL PATENCY WRIST A: YES
AST SERPL W P-5'-P-CCNC: 49 U/L (ref 15–37)
BASE DEFICIT BLDA-SCNC: 4.1 MMOL/L
BDY SITE: ABNORMAL
BILIRUB SERPL-MCNC: 0.4 MG/DL (ref 0.2–1)
BNP SERPL-MCNC: 3309 PG/ML
BODY TEMPERATURE: 98.1
BUN SERPL-MCNC: 64 MG/DL (ref 6–20)
BUN/CREAT SERPL: 16 (ref 12–20)
CA-I BLD-MCNC: 8.8 MG/DL (ref 8.5–10.1)
CHLORIDE SERPL-SCNC: 110 MMOL/L (ref 97–108)
CO2 SERPL-SCNC: 25 MMOL/L (ref 21–32)
COHGB MFR BLD: 0.5 % (ref 1–2)
CREAT SERPL-MCNC: 3.98 MG/DL (ref 0.7–1.3)
DATE LAST DOSE: NORMAL
DOSE AMOUNT: NORMAL UNITS
ERYTHROCYTE [DISTWIDTH] IN BLOOD BY AUTOMATED COUNT: 19.9 % (ref 11.5–14.5)
FIO2 ON VENT: 28 %
GAS FLOW.O2 O2 DELIVERY SYS: 2 L/MIN
GLOBULIN SER CALC-MCNC: 4.3 G/DL (ref 2–4)
GLUCOSE BLD STRIP.AUTO-MCNC: 126 MG/DL (ref 65–100)
GLUCOSE BLD STRIP.AUTO-MCNC: 151 MG/DL (ref 65–100)
GLUCOSE BLD STRIP.AUTO-MCNC: 166 MG/DL (ref 65–100)
GLUCOSE SERPL-MCNC: 127 MG/DL (ref 65–100)
HCO3 BLDA-SCNC: 21 MMOL/L (ref 22–26)
HCT VFR BLD AUTO: 24.7 % (ref 36.6–50.3)
HGB BLD-MCNC: 7.5 G/DL (ref 12.1–17)
MCH RBC QN AUTO: 30 PG (ref 26–34)
MCHC RBC AUTO-ENTMCNC: 30.4 G/DL (ref 30–36.5)
MCV RBC AUTO: 98.8 FL (ref 80–99)
METHGB MFR BLD: 0.4 % (ref 0–1.4)
NRBC # BLD: 0 K/UL (ref 0–0.01)
NRBC BLD-RTO: 0 PER 100 WBC
OXYHGB MFR BLD: 96 % (ref 95–99)
PCO2 BLDA: 38 MMHG (ref 35–45)
PERFORMED BY:: ABNORMAL
PH BLDA: 7.36 (ref 7.35–7.45)
PLATELET # BLD AUTO: 205 K/UL (ref 150–400)
PMV BLD AUTO: 10.4 FL (ref 8.9–12.9)
PO2 BLDA: 100 MMHG (ref 80–100)
POTASSIUM SERPL-SCNC: 4.9 MMOL/L (ref 3.5–5.1)
PROT SERPL-MCNC: 6.5 G/DL (ref 6.4–8.2)
RBC # BLD AUTO: 2.5 M/UL (ref 4.1–5.7)
SAO2 % BLD: 97 % (ref 95–99)
SAO2% DEVICE SAO2% SENSOR NAME: ABNORMAL
SODIUM SERPL-SCNC: 140 MMOL/L (ref 136–145)
SPECIMEN SITE: ABNORMAL
TROPONIN I SERPL HS-MCNC: 14 NG/L (ref 0–76)
TROPONIN I SERPL HS-MCNC: 16 NG/L (ref 0–76)
VANCOMYCIN SERPL-MCNC: 16.7 UG/ML
WBC # BLD AUTO: 5.2 K/UL (ref 4.1–11.1)

## 2024-09-07 PROCEDURE — 6370000000 HC RX 637 (ALT 250 FOR IP): Performed by: INTERNAL MEDICINE

## 2024-09-07 PROCEDURE — 2580000003 HC RX 258: Performed by: INTERNAL MEDICINE

## 2024-09-07 PROCEDURE — 71045 X-RAY EXAM CHEST 1 VIEW: CPT

## 2024-09-07 PROCEDURE — 99232 SBSQ HOSP IP/OBS MODERATE 35: CPT | Performed by: INTERNAL MEDICINE

## 2024-09-07 PROCEDURE — 36415 COLL VENOUS BLD VENIPUNCTURE: CPT

## 2024-09-07 PROCEDURE — 2700000000 HC OXYGEN THERAPY PER DAY

## 2024-09-07 PROCEDURE — 83880 ASSAY OF NATRIURETIC PEPTIDE: CPT

## 2024-09-07 PROCEDURE — 6370000000 HC RX 637 (ALT 250 FOR IP)

## 2024-09-07 PROCEDURE — 6360000002 HC RX W HCPCS: Performed by: INTERNAL MEDICINE

## 2024-09-07 PROCEDURE — 2000000000 HC ICU R&B

## 2024-09-07 PROCEDURE — 80053 COMPREHEN METABOLIC PANEL: CPT

## 2024-09-07 PROCEDURE — 6370000000 HC RX 637 (ALT 250 FOR IP): Performed by: FAMILY MEDICINE

## 2024-09-07 PROCEDURE — 94761 N-INVAS EAR/PLS OXIMETRY MLT: CPT

## 2024-09-07 PROCEDURE — 6360000002 HC RX W HCPCS: Performed by: FAMILY MEDICINE

## 2024-09-07 PROCEDURE — 80202 ASSAY OF VANCOMYCIN: CPT

## 2024-09-07 PROCEDURE — 94640 AIRWAY INHALATION TREATMENT: CPT

## 2024-09-07 PROCEDURE — 6370000000 HC RX 637 (ALT 250 FOR IP): Performed by: HOSPITALIST

## 2024-09-07 PROCEDURE — 2580000003 HC RX 258

## 2024-09-07 PROCEDURE — 93005 ELECTROCARDIOGRAM TRACING: CPT | Performed by: INTERNAL MEDICINE

## 2024-09-07 PROCEDURE — 82803 BLOOD GASES ANY COMBINATION: CPT

## 2024-09-07 PROCEDURE — 85027 COMPLETE CBC AUTOMATED: CPT

## 2024-09-07 PROCEDURE — 82962 GLUCOSE BLOOD TEST: CPT

## 2024-09-07 PROCEDURE — 82140 ASSAY OF AMMONIA: CPT

## 2024-09-07 PROCEDURE — 84484 ASSAY OF TROPONIN QUANT: CPT

## 2024-09-07 PROCEDURE — 97530 THERAPEUTIC ACTIVITIES: CPT

## 2024-09-07 PROCEDURE — 36600 WITHDRAWAL OF ARTERIAL BLOOD: CPT

## 2024-09-07 RX ORDER — MAGNESIUM HYDROXIDE/ALUMINUM HYDROXICE/SIMETHICONE 120; 1200; 1200 MG/30ML; MG/30ML; MG/30ML
30 SUSPENSION ORAL EVERY 4 HOURS PRN
Status: DISCONTINUED | OUTPATIENT
Start: 2024-09-07 | End: 2024-09-18 | Stop reason: HOSPADM

## 2024-09-07 RX ORDER — BUMETANIDE 0.25 MG/ML
2 INJECTION INTRAMUSCULAR; INTRAVENOUS 3 TIMES DAILY
Status: DISCONTINUED | OUTPATIENT
Start: 2024-09-07 | End: 2024-09-18 | Stop reason: HOSPADM

## 2024-09-07 RX ADMIN — WHITE PETROLATUM,ZINC OXIDE: 57; 17 PASTE TOPICAL at 20:53

## 2024-09-07 RX ADMIN — CALCIUM CARBONATE 500 MG: 500 TABLET, CHEWABLE ORAL at 20:51

## 2024-09-07 RX ADMIN — GABAPENTIN 300 MG: 300 CAPSULE ORAL at 10:41

## 2024-09-07 RX ADMIN — MUPIROCIN: 20 OINTMENT TOPICAL at 20:52

## 2024-09-07 RX ADMIN — WHITE PETROLATUM,ZINC OXIDE: 57; 17 PASTE TOPICAL at 11:12

## 2024-09-07 RX ADMIN — CLOTRIMAZOLE 10 MG: 10 LOZENGE ORAL at 10:40

## 2024-09-07 RX ADMIN — FLUTICASONE PROPIONATE 2 SPRAY: 50 SPRAY, METERED NASAL at 09:26

## 2024-09-07 RX ADMIN — HEPARIN SODIUM 5000 UNITS: 5000 INJECTION INTRAVENOUS; SUBCUTANEOUS at 05:15

## 2024-09-07 RX ADMIN — BUMETANIDE 2 MG: 0.25 INJECTION INTRAMUSCULAR; INTRAVENOUS at 21:50

## 2024-09-07 RX ADMIN — THERA TABS 1 TABLET: TAB at 10:42

## 2024-09-07 RX ADMIN — MUPIROCIN: 20 OINTMENT TOPICAL at 10:43

## 2024-09-07 RX ADMIN — CEFEPIME 2000 MG: 2 INJECTION, POWDER, FOR SOLUTION INTRAVENOUS at 16:10

## 2024-09-07 RX ADMIN — Medication 3 MG: at 20:51

## 2024-09-07 RX ADMIN — ATORVASTATIN CALCIUM 40 MG: 40 TABLET, FILM COATED ORAL at 20:51

## 2024-09-07 RX ADMIN — SENNOSIDES 8.6 MG: 8.6 TABLET, FILM COATED ORAL at 10:44

## 2024-09-07 RX ADMIN — ACETAMINOPHEN 650 MG: 325 TABLET ORAL at 01:05

## 2024-09-07 RX ADMIN — FERROUS SULFATE TAB 325 MG (65 MG ELEMENTAL FE) 325 MG: 325 (65 FE) TAB at 09:26

## 2024-09-07 RX ADMIN — SODIUM CHLORIDE, PRESERVATIVE FREE 10 ML: 5 INJECTION INTRAVENOUS at 20:54

## 2024-09-07 RX ADMIN — POLYETHYLENE GLYCOL 3350 17 G: 17 POWDER, FOR SOLUTION ORAL at 11:12

## 2024-09-07 RX ADMIN — HEPARIN SODIUM 5000 UNITS: 5000 INJECTION INTRAVENOUS; SUBCUTANEOUS at 20:51

## 2024-09-07 RX ADMIN — MIDODRINE HYDROCHLORIDE 10 MG: 5 TABLET ORAL at 18:46

## 2024-09-07 RX ADMIN — Medication 2 PUFF: at 06:40

## 2024-09-07 RX ADMIN — Medication 400 UNITS: at 10:41

## 2024-09-07 RX ADMIN — OXYCODONE HYDROCHLORIDE AND ACETAMINOPHEN 1000 MG: 500 TABLET ORAL at 20:50

## 2024-09-07 RX ADMIN — Medication 1 CAPSULE: at 10:35

## 2024-09-07 RX ADMIN — HEPARIN SODIUM 5000 UNITS: 5000 INJECTION INTRAVENOUS; SUBCUTANEOUS at 14:08

## 2024-09-07 RX ADMIN — FUROSEMIDE 40 MG: 10 INJECTION, SOLUTION INTRAMUSCULAR; INTRAVENOUS at 10:42

## 2024-09-07 RX ADMIN — TAMSULOSIN HYDROCHLORIDE 0.8 MG: 0.4 CAPSULE ORAL at 10:45

## 2024-09-07 RX ADMIN — GABAPENTIN 300 MG: 300 CAPSULE ORAL at 20:51

## 2024-09-07 RX ADMIN — HYDROXYZINE HYDROCHLORIDE 10 MG: 10 TABLET, FILM COATED ORAL at 01:05

## 2024-09-07 RX ADMIN — ZINC SULFATE 220 MG (50 MG) CAPSULE 50 MG: CAPSULE at 10:47

## 2024-09-07 RX ADMIN — MIDODRINE HYDROCHLORIDE 10 MG: 5 TABLET ORAL at 09:26

## 2024-09-07 RX ADMIN — SORBITOL SOLUTION (BULK) 30 ML: 70 SOLUTION at 14:30

## 2024-09-07 RX ADMIN — SODIUM CHLORIDE, PRESERVATIVE FREE 10 ML: 5 INJECTION INTRAVENOUS at 10:44

## 2024-09-07 RX ADMIN — CARVEDILOL 6.25 MG: 3.12 TABLET, FILM COATED ORAL at 10:38

## 2024-09-07 RX ADMIN — CALCIUM CARBONATE 500 MG: 500 TABLET, CHEWABLE ORAL at 10:36

## 2024-09-07 RX ADMIN — CLOTRIMAZOLE 10 MG: 10 LOZENGE ORAL at 19:53

## 2024-09-07 RX ADMIN — ASPIRIN 81 MG CHEWABLE TABLET 81 MG: 81 TABLET CHEWABLE at 10:30

## 2024-09-07 RX ADMIN — OXYCODONE HYDROCHLORIDE AND ACETAMINOPHEN 1000 MG: 500 TABLET ORAL at 10:30

## 2024-09-07 RX ADMIN — Medication 400 UNITS: at 20:51

## 2024-09-07 RX ADMIN — CETIRIZINE HYDROCHLORIDE 10 MG: 10 TABLET, FILM COATED ORAL at 10:40

## 2024-09-07 RX ADMIN — ALUMINUM HYDROXIDE, MAGNESIUM HYDROXIDE, AND SIMETHICONE 30 ML: 1200; 120; 1200 SUSPENSION ORAL at 14:23

## 2024-09-07 RX ADMIN — Medication 1 CAPSULE: at 09:26

## 2024-09-07 RX ADMIN — BUMETANIDE 2 MG: 0.25 INJECTION INTRAMUSCULAR; INTRAVENOUS at 15:49

## 2024-09-07 RX ADMIN — MIDODRINE HYDROCHLORIDE 10 MG: 5 TABLET ORAL at 14:13

## 2024-09-07 RX ADMIN — CLOTRIMAZOLE 10 MG: 10 LOZENGE ORAL at 16:03

## 2024-09-07 RX ADMIN — Medication 2 PUFF: at 19:42

## 2024-09-07 ASSESSMENT — PAIN SCALES - GENERAL
PAINLEVEL_OUTOF10: 0
PAINLEVEL_OUTOF10: 6
PAINLEVEL_OUTOF10: 0

## 2024-09-07 ASSESSMENT — PAIN - FUNCTIONAL ASSESSMENT: PAIN_FUNCTIONAL_ASSESSMENT: ACTIVITIES ARE NOT PREVENTED

## 2024-09-07 ASSESSMENT — PAIN DESCRIPTION - LOCATION: LOCATION: CHEST

## 2024-09-07 ASSESSMENT — PAIN DESCRIPTION - ORIENTATION: ORIENTATION: MID

## 2024-09-07 ASSESSMENT — PAIN DESCRIPTION - DESCRIPTORS: DESCRIPTORS: DISCOMFORT

## 2024-09-08 ENCOUNTER — APPOINTMENT (OUTPATIENT)
Facility: HOSPITAL | Age: 69
DRG: 291 | End: 2024-09-08
Payer: MEDICARE

## 2024-09-08 LAB
ALBUMIN SERPL-MCNC: 2.2 G/DL (ref 3.5–5)
AMMONIA PLAS-SCNC: 31 UMOL/L
ANION GAP SERPL CALC-SCNC: 7 MMOL/L (ref 2–12)
ARTERIAL PATENCY WRIST A: YES
BASE EXCESS BLDA CALC-SCNC: 0.1 MMOL/L (ref 0–3)
BDY SITE: ABNORMAL
BODY TEMPERATURE: 98.2
BUN SERPL-MCNC: 66 MG/DL (ref 6–20)
BUN/CREAT SERPL: 18 (ref 12–20)
CA-I BLD-MCNC: 8.9 MG/DL (ref 8.5–10.1)
CHLORIDE SERPL-SCNC: 111 MMOL/L (ref 97–108)
CO2 SERPL-SCNC: 24 MMOL/L (ref 21–32)
COHGB MFR BLD: 0.1 % (ref 1–2)
CREAT SERPL-MCNC: 3.64 MG/DL (ref 0.7–1.3)
DATE LAST DOSE: NORMAL
DOSE AMOUNT: NORMAL UNITS
FIO2 ON VENT: 32 %
GAS FLOW.O2 O2 DELIVERY SYS: 3 L/MIN
GLUCOSE BLD STRIP.AUTO-MCNC: 125 MG/DL (ref 65–100)
GLUCOSE BLD STRIP.AUTO-MCNC: 160 MG/DL (ref 65–100)
GLUCOSE BLD STRIP.AUTO-MCNC: 179 MG/DL (ref 65–100)
GLUCOSE BLD STRIP.AUTO-MCNC: 183 MG/DL (ref 65–100)
GLUCOSE SERPL-MCNC: 123 MG/DL (ref 65–100)
HCO3 BLDA-SCNC: 25 MMOL/L (ref 22–26)
MAGNESIUM SERPL-MCNC: 2.4 MG/DL (ref 1.6–2.4)
METHGB MFR BLD: 0.6 % (ref 0–1.4)
OXYHGB MFR BLD: 95.3 % (ref 95–99)
PCO2 BLDA: 44 MMHG (ref 35–45)
PERFORMED BY:: ABNORMAL
PH BLDA: 7.38 (ref 7.35–7.45)
PHOSPHATE SERPL-MCNC: 4.2 MG/DL (ref 2.6–4.7)
PO2 BLDA: 88 MMHG (ref 80–100)
POTASSIUM SERPL-SCNC: 4.6 MMOL/L (ref 3.5–5.1)
SAO2 % BLD: 96 % (ref 95–99)
SAO2% DEVICE SAO2% SENSOR NAME: ABNORMAL
SODIUM SERPL-SCNC: 142 MMOL/L (ref 136–145)
SPECIMEN SITE: ABNORMAL
VANCOMYCIN SERPL-MCNC: 15 UG/ML

## 2024-09-08 PROCEDURE — 36415 COLL VENOUS BLD VENIPUNCTURE: CPT

## 2024-09-08 PROCEDURE — 82962 GLUCOSE BLOOD TEST: CPT

## 2024-09-08 PROCEDURE — 6370000000 HC RX 637 (ALT 250 FOR IP)

## 2024-09-08 PROCEDURE — 99232 SBSQ HOSP IP/OBS MODERATE 35: CPT | Performed by: INTERNAL MEDICINE

## 2024-09-08 PROCEDURE — 80202 ASSAY OF VANCOMYCIN: CPT

## 2024-09-08 PROCEDURE — 2500000003 HC RX 250 WO HCPCS: Performed by: FAMILY MEDICINE

## 2024-09-08 PROCEDURE — 6370000000 HC RX 637 (ALT 250 FOR IP): Performed by: INTERNAL MEDICINE

## 2024-09-08 PROCEDURE — 2580000003 HC RX 258: Performed by: INTERNAL MEDICINE

## 2024-09-08 PROCEDURE — 82803 BLOOD GASES ANY COMBINATION: CPT

## 2024-09-08 PROCEDURE — 1100000000 HC RM PRIVATE

## 2024-09-08 PROCEDURE — 82140 ASSAY OF AMMONIA: CPT

## 2024-09-08 PROCEDURE — 36600 WITHDRAWAL OF ARTERIAL BLOOD: CPT

## 2024-09-08 PROCEDURE — 6360000002 HC RX W HCPCS: Performed by: INTERNAL MEDICINE

## 2024-09-08 PROCEDURE — 2580000003 HC RX 258

## 2024-09-08 PROCEDURE — 2700000000 HC OXYGEN THERAPY PER DAY

## 2024-09-08 PROCEDURE — 71045 X-RAY EXAM CHEST 1 VIEW: CPT

## 2024-09-08 PROCEDURE — 94761 N-INVAS EAR/PLS OXIMETRY MLT: CPT

## 2024-09-08 PROCEDURE — 6360000002 HC RX W HCPCS: Performed by: FAMILY MEDICINE

## 2024-09-08 PROCEDURE — 80069 RENAL FUNCTION PANEL: CPT

## 2024-09-08 PROCEDURE — 94640 AIRWAY INHALATION TREATMENT: CPT

## 2024-09-08 PROCEDURE — 83735 ASSAY OF MAGNESIUM: CPT

## 2024-09-08 RX ORDER — LIDOCAINE 4 G/G
1 PATCH TOPICAL DAILY
Status: DISCONTINUED | OUTPATIENT
Start: 2024-09-08 | End: 2024-09-18 | Stop reason: HOSPADM

## 2024-09-08 RX ADMIN — CARVEDILOL 6.25 MG: 3.12 TABLET, FILM COATED ORAL at 09:27

## 2024-09-08 RX ADMIN — Medication 400 UNITS: at 09:34

## 2024-09-08 RX ADMIN — CLOTRIMAZOLE 10 MG: 10 LOZENGE ORAL at 09:36

## 2024-09-08 RX ADMIN — MIDODRINE HYDROCHLORIDE 10 MG: 5 TABLET ORAL at 09:26

## 2024-09-08 RX ADMIN — FLUTICASONE PROPIONATE 2 SPRAY: 50 SPRAY, METERED NASAL at 09:39

## 2024-09-08 RX ADMIN — MIDODRINE HYDROCHLORIDE 10 MG: 5 TABLET ORAL at 18:04

## 2024-09-08 RX ADMIN — CLOTRIMAZOLE 10 MG: 10 LOZENGE ORAL at 01:05

## 2024-09-08 RX ADMIN — MUPIROCIN: 20 OINTMENT TOPICAL at 21:30

## 2024-09-08 RX ADMIN — CLOTRIMAZOLE 10 MG: 10 LOZENGE ORAL at 14:41

## 2024-09-08 RX ADMIN — BUMETANIDE 2 MG: 0.25 INJECTION INTRAMUSCULAR; INTRAVENOUS at 09:21

## 2024-09-08 RX ADMIN — MUPIROCIN: 20 OINTMENT TOPICAL at 09:37

## 2024-09-08 RX ADMIN — VANCOMYCIN HYDROCHLORIDE 1500 MG: 750 INJECTION, POWDER, LYOPHILIZED, FOR SOLUTION INTRAVENOUS at 11:16

## 2024-09-08 RX ADMIN — WHITE PETROLATUM,ZINC OXIDE: 57; 17 PASTE TOPICAL at 21:30

## 2024-09-08 RX ADMIN — HEPARIN SODIUM 5000 UNITS: 5000 INJECTION INTRAVENOUS; SUBCUTANEOUS at 05:08

## 2024-09-08 RX ADMIN — HYDROXYZINE HYDROCHLORIDE 10 MG: 10 TABLET, FILM COATED ORAL at 05:08

## 2024-09-08 RX ADMIN — Medication 400 UNITS: at 21:46

## 2024-09-08 RX ADMIN — WHITE PETROLATUM,ZINC OXIDE: 57; 17 PASTE TOPICAL at 09:41

## 2024-09-08 RX ADMIN — SODIUM CHLORIDE, PRESERVATIVE FREE 10 ML: 5 INJECTION INTRAVENOUS at 09:23

## 2024-09-08 RX ADMIN — Medication 1 CAPSULE: at 10:02

## 2024-09-08 RX ADMIN — GABAPENTIN 400 MG: 300 CAPSULE ORAL at 21:46

## 2024-09-08 RX ADMIN — FERROUS SULFATE TAB 325 MG (65 MG ELEMENTAL FE) 325 MG: 325 (65 FE) TAB at 09:34

## 2024-09-08 RX ADMIN — ATORVASTATIN CALCIUM 40 MG: 40 TABLET, FILM COATED ORAL at 21:46

## 2024-09-08 RX ADMIN — TAMSULOSIN HYDROCHLORIDE 0.8 MG: 0.4 CAPSULE ORAL at 09:31

## 2024-09-08 RX ADMIN — THERA TABS 1 TABLET: TAB at 09:36

## 2024-09-08 RX ADMIN — CLOTRIMAZOLE 10 MG: 10 LOZENGE ORAL at 21:46

## 2024-09-08 RX ADMIN — CALCIUM CARBONATE 500 MG: 500 TABLET, CHEWABLE ORAL at 21:46

## 2024-09-08 RX ADMIN — ASPIRIN 81 MG CHEWABLE TABLET 81 MG: 81 TABLET CHEWABLE at 09:30

## 2024-09-08 RX ADMIN — HEPARIN SODIUM 5000 UNITS: 5000 INJECTION INTRAVENOUS; SUBCUTANEOUS at 21:47

## 2024-09-08 RX ADMIN — OXYCODONE HYDROCHLORIDE AND ACETAMINOPHEN 1000 MG: 500 TABLET ORAL at 21:46

## 2024-09-08 RX ADMIN — CLOTRIMAZOLE 10 MG: 10 LOZENGE ORAL at 18:04

## 2024-09-08 RX ADMIN — BUMETANIDE 2 MG: 0.25 INJECTION INTRAMUSCULAR; INTRAVENOUS at 11:40

## 2024-09-08 RX ADMIN — SODIUM CHLORIDE, PRESERVATIVE FREE 10 ML: 5 INJECTION INTRAVENOUS at 21:47

## 2024-09-08 RX ADMIN — Medication 2 PUFF: at 06:40

## 2024-09-08 RX ADMIN — POLYETHYLENE GLYCOL 3350 17 G: 17 POWDER, FOR SOLUTION ORAL at 09:32

## 2024-09-08 RX ADMIN — Medication 1 CAPSULE: at 09:34

## 2024-09-08 RX ADMIN — CEFEPIME 2000 MG: 2 INJECTION, POWDER, FOR SOLUTION INTRAVENOUS at 14:44

## 2024-09-08 RX ADMIN — GABAPENTIN 400 MG: 300 CAPSULE ORAL at 14:41

## 2024-09-08 RX ADMIN — SENNOSIDES 8.6 MG: 8.6 TABLET, FILM COATED ORAL at 09:32

## 2024-09-08 RX ADMIN — GABAPENTIN 400 MG: 300 CAPSULE ORAL at 09:29

## 2024-09-08 RX ADMIN — PANTOPRAZOLE SODIUM 40 MG: 40 TABLET, DELAYED RELEASE ORAL at 06:47

## 2024-09-08 RX ADMIN — MIDODRINE HYDROCHLORIDE 10 MG: 5 TABLET ORAL at 11:42

## 2024-09-08 RX ADMIN — CETIRIZINE HYDROCHLORIDE 10 MG: 10 TABLET, FILM COATED ORAL at 09:29

## 2024-09-08 RX ADMIN — HEPARIN SODIUM 5000 UNITS: 5000 INJECTION INTRAVENOUS; SUBCUTANEOUS at 11:48

## 2024-09-08 RX ADMIN — Medication 2 PUFF: at 19:02

## 2024-09-08 RX ADMIN — CALCIUM CARBONATE 500 MG: 500 TABLET, CHEWABLE ORAL at 09:31

## 2024-09-08 RX ADMIN — OXYCODONE HYDROCHLORIDE AND ACETAMINOPHEN 1000 MG: 500 TABLET ORAL at 09:35

## 2024-09-08 RX ADMIN — ZINC SULFATE 220 MG (50 MG) CAPSULE 50 MG: CAPSULE at 10:02

## 2024-09-08 RX ADMIN — CLOTRIMAZOLE 10 MG: 10 LOZENGE ORAL at 06:47

## 2024-09-08 RX ADMIN — HYOSCYAMINE SULFATE: 16 SOLUTION at 09:41

## 2024-09-08 ASSESSMENT — PAIN SCALES - GENERAL
PAINLEVEL_OUTOF10: 0

## 2024-09-09 LAB
ALBUMIN SERPL-MCNC: 2.1 G/DL (ref 3.5–5)
ANION GAP SERPL CALC-SCNC: 7 MMOL/L (ref 2–12)
BUN SERPL-MCNC: 65 MG/DL (ref 6–20)
BUN/CREAT SERPL: 21 (ref 12–20)
CA-I BLD-MCNC: 8.8 MG/DL (ref 8.5–10.1)
CHLORIDE SERPL-SCNC: 112 MMOL/L (ref 97–108)
CO2 SERPL-SCNC: 25 MMOL/L (ref 21–32)
CREAT SERPL-MCNC: 3.06 MG/DL (ref 0.7–1.3)
GLUCOSE BLD STRIP.AUTO-MCNC: 133 MG/DL (ref 65–100)
GLUCOSE BLD STRIP.AUTO-MCNC: 133 MG/DL (ref 65–100)
GLUCOSE BLD STRIP.AUTO-MCNC: 170 MG/DL (ref 65–100)
GLUCOSE BLD STRIP.AUTO-MCNC: 228 MG/DL (ref 65–100)
GLUCOSE SERPL-MCNC: 127 MG/DL (ref 65–100)
PERFORMED BY:: ABNORMAL
PHOSPHATE SERPL-MCNC: 3.3 MG/DL (ref 2.6–4.7)
POTASSIUM SERPL-SCNC: 4.7 MMOL/L (ref 3.5–5.1)
SODIUM SERPL-SCNC: 144 MMOL/L (ref 136–145)

## 2024-09-09 PROCEDURE — 6360000002 HC RX W HCPCS: Performed by: INTERNAL MEDICINE

## 2024-09-09 PROCEDURE — 97530 THERAPEUTIC ACTIVITIES: CPT

## 2024-09-09 PROCEDURE — 97165 OT EVAL LOW COMPLEX 30 MIN: CPT

## 2024-09-09 PROCEDURE — 2700000000 HC OXYGEN THERAPY PER DAY

## 2024-09-09 PROCEDURE — 6370000000 HC RX 637 (ALT 250 FOR IP): Performed by: INTERNAL MEDICINE

## 2024-09-09 PROCEDURE — 6360000002 HC RX W HCPCS: Performed by: FAMILY MEDICINE

## 2024-09-09 PROCEDURE — 6370000000 HC RX 637 (ALT 250 FOR IP)

## 2024-09-09 PROCEDURE — 2580000003 HC RX 258: Performed by: INTERNAL MEDICINE

## 2024-09-09 PROCEDURE — 1100000000 HC RM PRIVATE

## 2024-09-09 PROCEDURE — 6370000000 HC RX 637 (ALT 250 FOR IP): Performed by: HOSPITALIST

## 2024-09-09 PROCEDURE — 99232 SBSQ HOSP IP/OBS MODERATE 35: CPT | Performed by: INTERNAL MEDICINE

## 2024-09-09 PROCEDURE — 2580000003 HC RX 258

## 2024-09-09 PROCEDURE — 80069 RENAL FUNCTION PANEL: CPT

## 2024-09-09 PROCEDURE — 94640 AIRWAY INHALATION TREATMENT: CPT

## 2024-09-09 PROCEDURE — 36415 COLL VENOUS BLD VENIPUNCTURE: CPT

## 2024-09-09 PROCEDURE — 82962 GLUCOSE BLOOD TEST: CPT

## 2024-09-09 PROCEDURE — 94761 N-INVAS EAR/PLS OXIMETRY MLT: CPT

## 2024-09-09 RX ORDER — DOCUSATE SODIUM 100 MG/1
100 CAPSULE, LIQUID FILLED ORAL 2 TIMES DAILY
Status: DISCONTINUED | OUTPATIENT
Start: 2024-09-09 | End: 2024-09-18 | Stop reason: HOSPADM

## 2024-09-09 RX ADMIN — PANTOPRAZOLE SODIUM 40 MG: 40 TABLET, DELAYED RELEASE ORAL at 08:54

## 2024-09-09 RX ADMIN — FERROUS SULFATE TAB 325 MG (65 MG ELEMENTAL FE) 325 MG: 325 (65 FE) TAB at 08:56

## 2024-09-09 RX ADMIN — Medication 1 CAPSULE: at 08:55

## 2024-09-09 RX ADMIN — CLOTRIMAZOLE 10 MG: 10 LOZENGE ORAL at 14:30

## 2024-09-09 RX ADMIN — GABAPENTIN 400 MG: 300 CAPSULE ORAL at 21:01

## 2024-09-09 RX ADMIN — SODIUM CHLORIDE, PRESERVATIVE FREE 10 ML: 5 INJECTION INTRAVENOUS at 09:00

## 2024-09-09 RX ADMIN — HEPARIN SODIUM 5000 UNITS: 5000 INJECTION INTRAVENOUS; SUBCUTANEOUS at 21:01

## 2024-09-09 RX ADMIN — INSULIN LISPRO 2 UNITS: 100 INJECTION, SOLUTION INTRAVENOUS; SUBCUTANEOUS at 12:15

## 2024-09-09 RX ADMIN — DOCUSATE SODIUM 100 MG: 100 CAPSULE, LIQUID FILLED ORAL at 21:02

## 2024-09-09 RX ADMIN — OXYCODONE HYDROCHLORIDE AND ACETAMINOPHEN 1000 MG: 500 TABLET ORAL at 08:55

## 2024-09-09 RX ADMIN — GABAPENTIN 400 MG: 300 CAPSULE ORAL at 08:55

## 2024-09-09 RX ADMIN — TAMSULOSIN HYDROCHLORIDE 0.8 MG: 0.4 CAPSULE ORAL at 08:56

## 2024-09-09 RX ADMIN — CALCIUM CARBONATE 500 MG: 500 TABLET, CHEWABLE ORAL at 08:54

## 2024-09-09 RX ADMIN — CARVEDILOL 6.25 MG: 3.12 TABLET, FILM COATED ORAL at 18:51

## 2024-09-09 RX ADMIN — CEFEPIME 2000 MG: 2 INJECTION, POWDER, FOR SOLUTION INTRAVENOUS at 15:20

## 2024-09-09 RX ADMIN — CARVEDILOL 6.25 MG: 3.12 TABLET, FILM COATED ORAL at 08:54

## 2024-09-09 RX ADMIN — ZINC SULFATE 220 MG (50 MG) CAPSULE 50 MG: CAPSULE at 08:54

## 2024-09-09 RX ADMIN — Medication 3 MG: at 21:02

## 2024-09-09 RX ADMIN — MIDODRINE HYDROCHLORIDE 10 MG: 5 TABLET ORAL at 08:53

## 2024-09-09 RX ADMIN — MIDODRINE HYDROCHLORIDE 10 MG: 5 TABLET ORAL at 18:51

## 2024-09-09 RX ADMIN — POLYETHYLENE GLYCOL 3350 17 G: 17 POWDER, FOR SOLUTION ORAL at 08:58

## 2024-09-09 RX ADMIN — HEPARIN SODIUM 5000 UNITS: 5000 INJECTION INTRAVENOUS; SUBCUTANEOUS at 05:41

## 2024-09-09 RX ADMIN — MUPIROCIN: 20 OINTMENT TOPICAL at 21:00

## 2024-09-09 RX ADMIN — Medication 2 PUFF: at 08:46

## 2024-09-09 RX ADMIN — CALCIUM CARBONATE 500 MG: 500 TABLET, CHEWABLE ORAL at 21:01

## 2024-09-09 RX ADMIN — WHITE PETROLATUM,ZINC OXIDE: 57; 17 PASTE TOPICAL at 22:58

## 2024-09-09 RX ADMIN — ACETAMINOPHEN 650 MG: 325 TABLET ORAL at 03:59

## 2024-09-09 RX ADMIN — FLUTICASONE PROPIONATE 2 SPRAY: 50 SPRAY, METERED NASAL at 08:59

## 2024-09-09 RX ADMIN — Medication 400 UNITS: at 08:54

## 2024-09-09 RX ADMIN — CLOTRIMAZOLE 10 MG: 10 LOZENGE ORAL at 23:32

## 2024-09-09 RX ADMIN — BUMETANIDE 2 MG: 0.25 INJECTION INTRAMUSCULAR; INTRAVENOUS at 08:57

## 2024-09-09 RX ADMIN — CLOTRIMAZOLE 10 MG: 10 LOZENGE ORAL at 18:51

## 2024-09-09 RX ADMIN — CLOTRIMAZOLE 10 MG: 10 LOZENGE ORAL at 08:54

## 2024-09-09 RX ADMIN — THERA TABS 1 TABLET: TAB at 08:53

## 2024-09-09 RX ADMIN — MUPIROCIN: 20 OINTMENT TOPICAL at 08:58

## 2024-09-09 RX ADMIN — BUMETANIDE 2 MG: 0.25 INJECTION INTRAMUSCULAR; INTRAVENOUS at 18:51

## 2024-09-09 RX ADMIN — GABAPENTIN 400 MG: 300 CAPSULE ORAL at 14:30

## 2024-09-09 RX ADMIN — ACETAMINOPHEN 650 MG: 325 TABLET ORAL at 09:05

## 2024-09-09 RX ADMIN — WHITE PETROLATUM,ZINC OXIDE: 57; 17 PASTE TOPICAL at 09:01

## 2024-09-09 RX ADMIN — OXYCODONE HYDROCHLORIDE AND ACETAMINOPHEN 1000 MG: 500 TABLET ORAL at 21:02

## 2024-09-09 RX ADMIN — ACETAMINOPHEN 650 MG: 325 TABLET ORAL at 14:29

## 2024-09-09 RX ADMIN — ATORVASTATIN CALCIUM 40 MG: 40 TABLET, FILM COATED ORAL at 21:02

## 2024-09-09 RX ADMIN — HEPARIN SODIUM 5000 UNITS: 5000 INJECTION INTRAVENOUS; SUBCUTANEOUS at 12:13

## 2024-09-09 RX ADMIN — Medication 2 PUFF: at 19:25

## 2024-09-09 RX ADMIN — ASPIRIN 81 MG CHEWABLE TABLET 81 MG: 81 TABLET CHEWABLE at 08:55

## 2024-09-09 RX ADMIN — MIDODRINE HYDROCHLORIDE 10 MG: 5 TABLET ORAL at 12:14

## 2024-09-09 RX ADMIN — CETIRIZINE HYDROCHLORIDE 10 MG: 10 TABLET, FILM COATED ORAL at 08:55

## 2024-09-09 RX ADMIN — HYOSCYAMINE SULFATE: 16 SOLUTION at 08:59

## 2024-09-09 RX ADMIN — CLOTRIMAZOLE 10 MG: 10 LOZENGE ORAL at 12:14

## 2024-09-09 RX ADMIN — SENNOSIDES 8.6 MG: 8.6 TABLET, FILM COATED ORAL at 08:56

## 2024-09-09 RX ADMIN — Medication 400 UNITS: at 21:01

## 2024-09-09 RX ADMIN — SODIUM CHLORIDE, PRESERVATIVE FREE 10 ML: 5 INJECTION INTRAVENOUS at 21:12

## 2024-09-09 RX ADMIN — SORBITOL SOLUTION (BULK) 30 ML: 70 SOLUTION at 18:52

## 2024-09-09 ASSESSMENT — PAIN SCALES - GENERAL
PAINLEVEL_OUTOF10: 0
PAINLEVEL_OUTOF10: 0
PAINLEVEL_OUTOF10: 8
PAINLEVEL_OUTOF10: 9
PAINLEVEL_OUTOF10: 10
PAINLEVEL_OUTOF10: 2

## 2024-09-09 ASSESSMENT — PAIN DESCRIPTION - LOCATION
LOCATION: GENERALIZED
LOCATION: GENERALIZED
LOCATION: HEAD;NECK
LOCATION: GENERALIZED

## 2024-09-09 ASSESSMENT — PAIN DESCRIPTION - DESCRIPTORS
DESCRIPTORS: ACHING
DESCRIPTORS: ACHING

## 2024-09-10 LAB
ALBUMIN SERPL-MCNC: 2 G/DL (ref 3.5–5)
ALBUMIN/GLOB SERPL: 0.5 (ref 1.1–2.2)
ALP SERPL-CCNC: 86 U/L (ref 45–117)
ALT SERPL-CCNC: 39 U/L (ref 12–78)
ANION GAP SERPL CALC-SCNC: 7 MMOL/L (ref 2–12)
AST SERPL W P-5'-P-CCNC: 45 U/L (ref 15–37)
BACTERIA SPEC CULT: NORMAL
BACTERIA SPEC CULT: NORMAL
BILIRUB SERPL-MCNC: 0.3 MG/DL (ref 0.2–1)
BUN SERPL-MCNC: 64 MG/DL (ref 6–20)
BUN/CREAT SERPL: 21 (ref 12–20)
CA-I BLD-MCNC: 8.8 MG/DL (ref 8.5–10.1)
CHLORIDE SERPL-SCNC: 113 MMOL/L (ref 97–108)
CO2 SERPL-SCNC: 24 MMOL/L (ref 21–32)
CREAT SERPL-MCNC: 2.98 MG/DL (ref 0.7–1.3)
DATE LAST DOSE: NORMAL
DOSE AMOUNT: NORMAL UNITS
EKG ATRIAL RATE: 81 BPM
EKG DIAGNOSIS: NORMAL
EKG P AXIS: 81 DEGREES
EKG P-R INTERVAL: 172 MS
EKG Q-T INTERVAL: 396 MS
EKG QRS DURATION: 116 MS
EKG QTC CALCULATION (BAZETT): 460 MS
EKG R AXIS: 130 DEGREES
EKG T AXIS: -19 DEGREES
EKG VENTRICULAR RATE: 81 BPM
ERYTHROCYTE [DISTWIDTH] IN BLOOD BY AUTOMATED COUNT: 19.5 % (ref 11.5–14.5)
GLOBULIN SER CALC-MCNC: 4.4 G/DL (ref 2–4)
GLUCOSE BLD STRIP.AUTO-MCNC: 135 MG/DL (ref 65–100)
GLUCOSE BLD STRIP.AUTO-MCNC: 174 MG/DL (ref 65–100)
GLUCOSE BLD STRIP.AUTO-MCNC: 214 MG/DL (ref 65–100)
GLUCOSE BLD STRIP.AUTO-MCNC: 215 MG/DL (ref 65–100)
GLUCOSE SERPL-MCNC: 135 MG/DL (ref 65–100)
HCT VFR BLD AUTO: 26 % (ref 36.6–50.3)
HGB BLD-MCNC: 7.8 G/DL (ref 12.1–17)
Lab: NORMAL
Lab: NORMAL
MCH RBC QN AUTO: 30 PG (ref 26–34)
MCHC RBC AUTO-ENTMCNC: 30 G/DL (ref 30–36.5)
MCV RBC AUTO: 100 FL (ref 80–99)
NRBC # BLD: 0 K/UL (ref 0–0.01)
NRBC BLD-RTO: 0 PER 100 WBC
PERFORMED BY:: ABNORMAL
PLATELET # BLD AUTO: 326 K/UL (ref 150–400)
PMV BLD AUTO: 10.2 FL (ref 8.9–12.9)
POTASSIUM SERPL-SCNC: 4.2 MMOL/L (ref 3.5–5.1)
PROT SERPL-MCNC: 6.4 G/DL (ref 6.4–8.2)
RBC # BLD AUTO: 2.6 M/UL (ref 4.1–5.7)
SODIUM SERPL-SCNC: 144 MMOL/L (ref 136–145)
VANCOMYCIN SERPL-MCNC: 16 UG/ML
WBC # BLD AUTO: 4.7 K/UL (ref 4.1–11.1)

## 2024-09-10 PROCEDURE — 6370000000 HC RX 637 (ALT 250 FOR IP): Performed by: INTERNAL MEDICINE

## 2024-09-10 PROCEDURE — 80053 COMPREHEN METABOLIC PANEL: CPT

## 2024-09-10 PROCEDURE — 85027 COMPLETE CBC AUTOMATED: CPT

## 2024-09-10 PROCEDURE — 6370000000 HC RX 637 (ALT 250 FOR IP)

## 2024-09-10 PROCEDURE — 6360000002 HC RX W HCPCS: Performed by: FAMILY MEDICINE

## 2024-09-10 PROCEDURE — 6370000000 HC RX 637 (ALT 250 FOR IP): Performed by: HOSPITALIST

## 2024-09-10 PROCEDURE — 36415 COLL VENOUS BLD VENIPUNCTURE: CPT

## 2024-09-10 PROCEDURE — 1100000000 HC RM PRIVATE

## 2024-09-10 PROCEDURE — 94761 N-INVAS EAR/PLS OXIMETRY MLT: CPT

## 2024-09-10 PROCEDURE — 6360000002 HC RX W HCPCS: Performed by: INTERNAL MEDICINE

## 2024-09-10 PROCEDURE — 2580000003 HC RX 258: Performed by: INTERNAL MEDICINE

## 2024-09-10 PROCEDURE — 82962 GLUCOSE BLOOD TEST: CPT

## 2024-09-10 PROCEDURE — 94640 AIRWAY INHALATION TREATMENT: CPT

## 2024-09-10 PROCEDURE — 2700000000 HC OXYGEN THERAPY PER DAY

## 2024-09-10 PROCEDURE — 80202 ASSAY OF VANCOMYCIN: CPT

## 2024-09-10 PROCEDURE — 2580000003 HC RX 258

## 2024-09-10 RX ADMIN — BUMETANIDE 2 MG: 0.25 INJECTION INTRAMUSCULAR; INTRAVENOUS at 13:28

## 2024-09-10 RX ADMIN — INSULIN LISPRO 2 UNITS: 100 INJECTION, SOLUTION INTRAVENOUS; SUBCUTANEOUS at 16:30

## 2024-09-10 RX ADMIN — MIDODRINE HYDROCHLORIDE 10 MG: 5 TABLET ORAL at 16:22

## 2024-09-10 RX ADMIN — CALCIUM CARBONATE 500 MG: 500 TABLET, CHEWABLE ORAL at 08:41

## 2024-09-10 RX ADMIN — GABAPENTIN 400 MG: 300 CAPSULE ORAL at 21:30

## 2024-09-10 RX ADMIN — MIDODRINE HYDROCHLORIDE 10 MG: 5 TABLET ORAL at 11:45

## 2024-09-10 RX ADMIN — CALCIUM CARBONATE 500 MG: 500 TABLET, CHEWABLE ORAL at 21:41

## 2024-09-10 RX ADMIN — CLOTRIMAZOLE 10 MG: 10 LOZENGE ORAL at 18:07

## 2024-09-10 RX ADMIN — SENNOSIDES 8.6 MG: 8.6 TABLET, FILM COATED ORAL at 08:42

## 2024-09-10 RX ADMIN — POLYETHYLENE GLYCOL 3350 17 G: 17 POWDER, FOR SOLUTION ORAL at 08:44

## 2024-09-10 RX ADMIN — Medication 400 UNITS: at 08:41

## 2024-09-10 RX ADMIN — Medication 1 CAPSULE: at 08:40

## 2024-09-10 RX ADMIN — CLOTRIMAZOLE 10 MG: 10 LOZENGE ORAL at 11:45

## 2024-09-10 RX ADMIN — CARVEDILOL 6.25 MG: 3.12 TABLET, FILM COATED ORAL at 08:41

## 2024-09-10 RX ADMIN — HEPARIN SODIUM 5000 UNITS: 5000 INJECTION INTRAVENOUS; SUBCUTANEOUS at 20:30

## 2024-09-10 RX ADMIN — WHITE PETROLATUM,ZINC OXIDE: 57; 17 PASTE TOPICAL at 09:18

## 2024-09-10 RX ADMIN — HYOSCYAMINE SULFATE: 16 SOLUTION at 09:18

## 2024-09-10 RX ADMIN — BUMETANIDE 2 MG: 0.25 INJECTION INTRAMUSCULAR; INTRAVENOUS at 18:07

## 2024-09-10 RX ADMIN — ACETAMINOPHEN 650 MG: 325 TABLET ORAL at 13:31

## 2024-09-10 RX ADMIN — PANTOPRAZOLE SODIUM 40 MG: 40 TABLET, DELAYED RELEASE ORAL at 06:23

## 2024-09-10 RX ADMIN — Medication 2 PUFF: at 20:41

## 2024-09-10 RX ADMIN — Medication 1 CAPSULE: at 08:41

## 2024-09-10 RX ADMIN — HEPARIN SODIUM 5000 UNITS: 5000 INJECTION INTRAVENOUS; SUBCUTANEOUS at 11:45

## 2024-09-10 RX ADMIN — Medication 400 UNITS: at 21:55

## 2024-09-10 RX ADMIN — MIDODRINE HYDROCHLORIDE 10 MG: 5 TABLET ORAL at 09:06

## 2024-09-10 RX ADMIN — CLOTRIMAZOLE 10 MG: 10 LOZENGE ORAL at 06:24

## 2024-09-10 RX ADMIN — TAMSULOSIN HYDROCHLORIDE 0.8 MG: 0.4 CAPSULE ORAL at 08:39

## 2024-09-10 RX ADMIN — Medication 3 MG: at 21:55

## 2024-09-10 RX ADMIN — OXYCODONE HYDROCHLORIDE AND ACETAMINOPHEN 1000 MG: 500 TABLET ORAL at 21:41

## 2024-09-10 RX ADMIN — Medication 2 PUFF: at 09:01

## 2024-09-10 RX ADMIN — CLOTRIMAZOLE 10 MG: 10 LOZENGE ORAL at 15:00

## 2024-09-10 RX ADMIN — BUMETANIDE 2 MG: 0.25 INJECTION INTRAMUSCULAR; INTRAVENOUS at 08:39

## 2024-09-10 RX ADMIN — CETIRIZINE HYDROCHLORIDE 10 MG: 10 TABLET, FILM COATED ORAL at 08:40

## 2024-09-10 RX ADMIN — FLUTICASONE PROPIONATE 2 SPRAY: 50 SPRAY, METERED NASAL at 13:34

## 2024-09-10 RX ADMIN — ATORVASTATIN CALCIUM 40 MG: 40 TABLET, FILM COATED ORAL at 21:41

## 2024-09-10 RX ADMIN — HEPARIN SODIUM 5000 UNITS: 5000 INJECTION INTRAVENOUS; SUBCUTANEOUS at 04:11

## 2024-09-10 RX ADMIN — ACETAMINOPHEN 650 MG: 325 TABLET ORAL at 21:55

## 2024-09-10 RX ADMIN — GABAPENTIN 400 MG: 300 CAPSULE ORAL at 08:40

## 2024-09-10 RX ADMIN — ACETAMINOPHEN 650 MG: 325 TABLET ORAL at 09:06

## 2024-09-10 RX ADMIN — THERA TABS 1 TABLET: TAB at 09:07

## 2024-09-10 RX ADMIN — DOCUSATE SODIUM 100 MG: 100 CAPSULE, LIQUID FILLED ORAL at 09:07

## 2024-09-10 RX ADMIN — SODIUM CHLORIDE, PRESERVATIVE FREE 10 ML: 5 INJECTION INTRAVENOUS at 09:17

## 2024-09-10 RX ADMIN — FERROUS SULFATE TAB 325 MG (65 MG ELEMENTAL FE) 325 MG: 325 (65 FE) TAB at 08:40

## 2024-09-10 RX ADMIN — SODIUM CHLORIDE 1250 MG: 9 INJECTION, SOLUTION INTRAVENOUS at 11:59

## 2024-09-10 RX ADMIN — ZINC SULFATE 220 MG (50 MG) CAPSULE 50 MG: CAPSULE at 08:40

## 2024-09-10 RX ADMIN — MUPIROCIN: 20 OINTMENT TOPICAL at 09:07

## 2024-09-10 RX ADMIN — ASPIRIN 81 MG CHEWABLE TABLET 81 MG: 81 TABLET CHEWABLE at 08:41

## 2024-09-10 RX ADMIN — GABAPENTIN 400 MG: 300 CAPSULE ORAL at 13:28

## 2024-09-10 RX ADMIN — CARVEDILOL 6.25 MG: 3.12 TABLET, FILM COATED ORAL at 16:22

## 2024-09-10 RX ADMIN — WHITE PETROLATUM,ZINC OXIDE: 57; 17 PASTE TOPICAL at 21:46

## 2024-09-10 RX ADMIN — CLOTRIMAZOLE 10 MG: 10 LOZENGE ORAL at 21:41

## 2024-09-10 RX ADMIN — ACETAMINOPHEN 650 MG: 325 TABLET ORAL at 00:44

## 2024-09-10 RX ADMIN — SODIUM CHLORIDE, PRESERVATIVE FREE 10 ML: 5 INJECTION INTRAVENOUS at 21:44

## 2024-09-10 RX ADMIN — DOCUSATE SODIUM 100 MG: 100 CAPSULE, LIQUID FILLED ORAL at 21:41

## 2024-09-10 RX ADMIN — OXYCODONE HYDROCHLORIDE AND ACETAMINOPHEN 1000 MG: 500 TABLET ORAL at 08:41

## 2024-09-10 ASSESSMENT — PAIN SCALES - GENERAL
PAINLEVEL_OUTOF10: 0
PAINLEVEL_OUTOF10: 0
PAINLEVEL_OUTOF10: 5
PAINLEVEL_OUTOF10: 8
PAINLEVEL_OUTOF10: 9
PAINLEVEL_OUTOF10: 3
PAINLEVEL_OUTOF10: 4
PAINLEVEL_OUTOF10: 2
PAINLEVEL_OUTOF10: 4
PAINLEVEL_OUTOF10: 7
PAINLEVEL_OUTOF10: 10
PAINLEVEL_OUTOF10: 0

## 2024-09-10 ASSESSMENT — PAIN DESCRIPTION - ORIENTATION
ORIENTATION: RIGHT;LEFT
ORIENTATION: RIGHT
ORIENTATION: RIGHT;LEFT;ANTERIOR;POSTERIOR;DISTAL

## 2024-09-10 ASSESSMENT — PAIN DESCRIPTION - DESCRIPTORS
DESCRIPTORS: SHARP
DESCRIPTORS: ACHING
DESCRIPTORS: ACHING

## 2024-09-10 ASSESSMENT — PAIN DESCRIPTION - LOCATION
LOCATION: LEG
LOCATION: LEG;HIP
LOCATION: HEAD

## 2024-09-10 ASSESSMENT — PAIN - FUNCTIONAL ASSESSMENT: PAIN_FUNCTIONAL_ASSESSMENT: ACTIVITIES ARE NOT PREVENTED

## 2024-09-11 LAB
ALBUMIN SERPL-MCNC: 2.2 G/DL (ref 3.5–5)
ANION GAP SERPL CALC-SCNC: 9 MMOL/L (ref 2–12)
BUN SERPL-MCNC: 56 MG/DL (ref 6–20)
BUN/CREAT SERPL: 22 (ref 12–20)
CA-I BLD-MCNC: 9 MG/DL (ref 8.5–10.1)
CHLORIDE SERPL-SCNC: 112 MMOL/L (ref 97–108)
CO2 SERPL-SCNC: 23 MMOL/L (ref 21–32)
CREAT SERPL-MCNC: 2.54 MG/DL (ref 0.7–1.3)
GLUCOSE BLD STRIP.AUTO-MCNC: 136 MG/DL (ref 65–100)
GLUCOSE BLD STRIP.AUTO-MCNC: 150 MG/DL (ref 65–100)
GLUCOSE BLD STRIP.AUTO-MCNC: 190 MG/DL (ref 65–100)
GLUCOSE BLD STRIP.AUTO-MCNC: 221 MG/DL (ref 65–100)
GLUCOSE SERPL-MCNC: 212 MG/DL (ref 65–100)
PERFORMED BY:: ABNORMAL
PHOSPHATE SERPL-MCNC: 2.6 MG/DL (ref 2.6–4.7)
POTASSIUM SERPL-SCNC: 4.7 MMOL/L (ref 3.5–5.1)
SODIUM SERPL-SCNC: 144 MMOL/L (ref 136–145)

## 2024-09-11 PROCEDURE — 6370000000 HC RX 637 (ALT 250 FOR IP): Performed by: INTERNAL MEDICINE

## 2024-09-11 PROCEDURE — 94640 AIRWAY INHALATION TREATMENT: CPT

## 2024-09-11 PROCEDURE — 82962 GLUCOSE BLOOD TEST: CPT

## 2024-09-11 PROCEDURE — 2580000003 HC RX 258

## 2024-09-11 PROCEDURE — 94761 N-INVAS EAR/PLS OXIMETRY MLT: CPT

## 2024-09-11 PROCEDURE — 99232 SBSQ HOSP IP/OBS MODERATE 35: CPT | Performed by: INTERNAL MEDICINE

## 2024-09-11 PROCEDURE — 6370000000 HC RX 637 (ALT 250 FOR IP)

## 2024-09-11 PROCEDURE — 6360000002 HC RX W HCPCS: Performed by: INTERNAL MEDICINE

## 2024-09-11 PROCEDURE — 36415 COLL VENOUS BLD VENIPUNCTURE: CPT

## 2024-09-11 PROCEDURE — 97530 THERAPEUTIC ACTIVITIES: CPT

## 2024-09-11 PROCEDURE — 2700000000 HC OXYGEN THERAPY PER DAY

## 2024-09-11 PROCEDURE — 80069 RENAL FUNCTION PANEL: CPT

## 2024-09-11 PROCEDURE — 6370000000 HC RX 637 (ALT 250 FOR IP): Performed by: HOSPITALIST

## 2024-09-11 PROCEDURE — 1100000000 HC RM PRIVATE

## 2024-09-11 PROCEDURE — 6360000002 HC RX W HCPCS: Performed by: FAMILY MEDICINE

## 2024-09-11 RX ADMIN — BUMETANIDE 2 MG: 0.25 INJECTION INTRAMUSCULAR; INTRAVENOUS at 11:09

## 2024-09-11 RX ADMIN — BUMETANIDE 2 MG: 0.25 INJECTION INTRAMUSCULAR; INTRAVENOUS at 17:19

## 2024-09-11 RX ADMIN — POLYETHYLENE GLYCOL 3350 17 G: 17 POWDER, FOR SOLUTION ORAL at 10:47

## 2024-09-11 RX ADMIN — OXYCODONE HYDROCHLORIDE AND ACETAMINOPHEN 1000 MG: 500 TABLET ORAL at 11:11

## 2024-09-11 RX ADMIN — PANTOPRAZOLE SODIUM 40 MG: 40 TABLET, DELAYED RELEASE ORAL at 06:12

## 2024-09-11 RX ADMIN — THERA TABS 1 TABLET: TAB at 11:17

## 2024-09-11 RX ADMIN — CETIRIZINE HYDROCHLORIDE 10 MG: 10 TABLET, FILM COATED ORAL at 11:12

## 2024-09-11 RX ADMIN — Medication 2 PUFF: at 08:56

## 2024-09-11 RX ADMIN — HEPARIN SODIUM 5000 UNITS: 5000 INJECTION INTRAVENOUS; SUBCUTANEOUS at 20:16

## 2024-09-11 RX ADMIN — Medication 400 UNITS: at 11:17

## 2024-09-11 RX ADMIN — CLOTRIMAZOLE 10 MG: 10 LOZENGE ORAL at 17:19

## 2024-09-11 RX ADMIN — SENNOSIDES 8.6 MG: 8.6 TABLET, FILM COATED ORAL at 11:19

## 2024-09-11 RX ADMIN — HYOSCYAMINE SULFATE: 16 SOLUTION at 11:22

## 2024-09-11 RX ADMIN — Medication 1 CAPSULE: at 11:30

## 2024-09-11 RX ADMIN — CLOTRIMAZOLE 10 MG: 10 LOZENGE ORAL at 06:12

## 2024-09-11 RX ADMIN — WHITE PETROLATUM,ZINC OXIDE: 57; 17 PASTE TOPICAL at 11:22

## 2024-09-11 RX ADMIN — CALCIUM CARBONATE 500 MG: 500 TABLET, CHEWABLE ORAL at 11:15

## 2024-09-11 RX ADMIN — ASPIRIN 81 MG CHEWABLE TABLET 81 MG: 81 TABLET CHEWABLE at 10:47

## 2024-09-11 RX ADMIN — CARVEDILOL 6.25 MG: 3.12 TABLET, FILM COATED ORAL at 11:29

## 2024-09-11 RX ADMIN — MIDODRINE HYDROCHLORIDE 10 MG: 5 TABLET ORAL at 17:19

## 2024-09-11 RX ADMIN — WHITE PETROLATUM,ZINC OXIDE: 57; 17 PASTE TOPICAL at 20:20

## 2024-09-11 RX ADMIN — Medication 3 MG: at 20:17

## 2024-09-11 RX ADMIN — Medication 2 PUFF: at 19:51

## 2024-09-11 RX ADMIN — ATORVASTATIN CALCIUM 40 MG: 40 TABLET, FILM COATED ORAL at 20:17

## 2024-09-11 RX ADMIN — ACETAMINOPHEN 650 MG: 325 TABLET ORAL at 04:37

## 2024-09-11 RX ADMIN — CLOTRIMAZOLE 10 MG: 10 LOZENGE ORAL at 22:33

## 2024-09-11 RX ADMIN — SODIUM CHLORIDE, PRESERVATIVE FREE 10 ML: 5 INJECTION INTRAVENOUS at 11:20

## 2024-09-11 RX ADMIN — GABAPENTIN 400 MG: 300 CAPSULE ORAL at 14:53

## 2024-09-11 RX ADMIN — DOCUSATE SODIUM 100 MG: 100 CAPSULE, LIQUID FILLED ORAL at 11:19

## 2024-09-11 RX ADMIN — CLOTRIMAZOLE 10 MG: 10 LOZENGE ORAL at 10:46

## 2024-09-11 RX ADMIN — GABAPENTIN 400 MG: 300 CAPSULE ORAL at 11:15

## 2024-09-11 RX ADMIN — ACETAMINOPHEN 650 MG: 325 TABLET ORAL at 15:01

## 2024-09-11 RX ADMIN — HEPARIN SODIUM 5000 UNITS: 5000 INJECTION INTRAVENOUS; SUBCUTANEOUS at 04:30

## 2024-09-11 RX ADMIN — Medication 400 UNITS: at 20:17

## 2024-09-11 RX ADMIN — CARVEDILOL 6.25 MG: 3.12 TABLET, FILM COATED ORAL at 17:19

## 2024-09-11 RX ADMIN — BUMETANIDE 2 MG: 0.25 INJECTION INTRAMUSCULAR; INTRAVENOUS at 14:53

## 2024-09-11 RX ADMIN — CALCIUM CARBONATE 500 MG: 500 TABLET, CHEWABLE ORAL at 20:17

## 2024-09-11 RX ADMIN — CLOTRIMAZOLE 10 MG: 10 LOZENGE ORAL at 14:53

## 2024-09-11 RX ADMIN — Medication 2 PUFF: at 08:59

## 2024-09-11 RX ADMIN — FERROUS SULFATE TAB 325 MG (65 MG ELEMENTAL FE) 325 MG: 325 (65 FE) TAB at 11:30

## 2024-09-11 RX ADMIN — DOCUSATE SODIUM 100 MG: 100 CAPSULE, LIQUID FILLED ORAL at 20:17

## 2024-09-11 RX ADMIN — TAMSULOSIN HYDROCHLORIDE 0.8 MG: 0.4 CAPSULE ORAL at 11:12

## 2024-09-11 RX ADMIN — ZINC SULFATE 220 MG (50 MG) CAPSULE 50 MG: CAPSULE at 11:12

## 2024-09-11 RX ADMIN — Medication 1 CAPSULE: at 11:17

## 2024-09-11 RX ADMIN — HEPARIN SODIUM 5000 UNITS: 5000 INJECTION INTRAVENOUS; SUBCUTANEOUS at 13:05

## 2024-09-11 RX ADMIN — SODIUM CHLORIDE, PRESERVATIVE FREE 10 ML: 5 INJECTION INTRAVENOUS at 20:17

## 2024-09-11 RX ADMIN — GABAPENTIN 400 MG: 300 CAPSULE ORAL at 20:47

## 2024-09-11 RX ADMIN — FLUTICASONE PROPIONATE 2 SPRAY: 50 SPRAY, METERED NASAL at 10:49

## 2024-09-11 RX ADMIN — MIDODRINE HYDROCHLORIDE 10 MG: 5 TABLET ORAL at 11:14

## 2024-09-11 RX ADMIN — OXYCODONE HYDROCHLORIDE AND ACETAMINOPHEN 1000 MG: 500 TABLET ORAL at 20:17

## 2024-09-11 RX ADMIN — ACETAMINOPHEN 650 MG: 325 TABLET ORAL at 21:38

## 2024-09-11 ASSESSMENT — PAIN SCALES - GENERAL
PAINLEVEL_OUTOF10: 3
PAINLEVEL_OUTOF10: 4
PAINLEVEL_OUTOF10: 4
PAINLEVEL_OUTOF10: 10
PAINLEVEL_OUTOF10: 6
PAINLEVEL_OUTOF10: 2
PAINLEVEL_OUTOF10: 4
PAINLEVEL_OUTOF10: 3
PAINLEVEL_OUTOF10: 4
PAINLEVEL_OUTOF10: 3
PAINLEVEL_OUTOF10: 8
PAINLEVEL_OUTOF10: 6
PAINLEVEL_OUTOF10: 2

## 2024-09-11 ASSESSMENT — PAIN DESCRIPTION - LOCATION
LOCATION: HIP;LEG;BACK
LOCATION: LEG;BACK
LOCATION: BACK;LEG
LOCATION: BACK;LEG

## 2024-09-11 ASSESSMENT — PAIN DESCRIPTION - ORIENTATION
ORIENTATION: LEFT;RIGHT;LOWER
ORIENTATION: RIGHT;LEFT;POSTERIOR
ORIENTATION: RIGHT;LEFT;ANTERIOR;POSTERIOR

## 2024-09-11 ASSESSMENT — PAIN DESCRIPTION - DESCRIPTORS
DESCRIPTORS: SHARP
DESCRIPTORS: ACHING;HEAVINESS
DESCRIPTORS: SHARP

## 2024-09-11 ASSESSMENT — PAIN - FUNCTIONAL ASSESSMENT: PAIN_FUNCTIONAL_ASSESSMENT: ACTIVITIES ARE NOT PREVENTED

## 2024-09-12 LAB
ALBUMIN SERPL-MCNC: 2.2 G/DL (ref 3.5–5)
ANION GAP SERPL CALC-SCNC: 8 MMOL/L (ref 2–12)
BUN SERPL-MCNC: 53 MG/DL (ref 6–20)
BUN/CREAT SERPL: 23 (ref 12–20)
CA-I BLD-MCNC: 9 MG/DL (ref 8.5–10.1)
CHLORIDE SERPL-SCNC: 110 MMOL/L (ref 97–108)
CO2 SERPL-SCNC: 26 MMOL/L (ref 21–32)
CREAT SERPL-MCNC: 2.33 MG/DL (ref 0.7–1.3)
DATE LAST DOSE: NORMAL
DOSE AMOUNT: NORMAL UNITS
GLUCOSE BLD STRIP.AUTO-MCNC: 144 MG/DL (ref 65–100)
GLUCOSE BLD STRIP.AUTO-MCNC: 147 MG/DL (ref 65–100)
GLUCOSE BLD STRIP.AUTO-MCNC: 158 MG/DL (ref 65–100)
GLUCOSE BLD STRIP.AUTO-MCNC: 175 MG/DL (ref 65–100)
GLUCOSE SERPL-MCNC: 128 MG/DL (ref 65–100)
PERFORMED BY:: ABNORMAL
PHOSPHATE SERPL-MCNC: 3 MG/DL (ref 2.6–4.7)
POTASSIUM SERPL-SCNC: 4.3 MMOL/L (ref 3.5–5.1)
SODIUM SERPL-SCNC: 144 MMOL/L (ref 136–145)
VANCOMYCIN SERPL-MCNC: 16.6 UG/ML

## 2024-09-12 PROCEDURE — 80069 RENAL FUNCTION PANEL: CPT

## 2024-09-12 PROCEDURE — 6370000000 HC RX 637 (ALT 250 FOR IP)

## 2024-09-12 PROCEDURE — 6360000002 HC RX W HCPCS: Performed by: INTERNAL MEDICINE

## 2024-09-12 PROCEDURE — 97530 THERAPEUTIC ACTIVITIES: CPT

## 2024-09-12 PROCEDURE — 94761 N-INVAS EAR/PLS OXIMETRY MLT: CPT

## 2024-09-12 PROCEDURE — 6360000002 HC RX W HCPCS

## 2024-09-12 PROCEDURE — 80202 ASSAY OF VANCOMYCIN: CPT

## 2024-09-12 PROCEDURE — 94640 AIRWAY INHALATION TREATMENT: CPT

## 2024-09-12 PROCEDURE — 6370000000 HC RX 637 (ALT 250 FOR IP): Performed by: INTERNAL MEDICINE

## 2024-09-12 PROCEDURE — 6370000000 HC RX 637 (ALT 250 FOR IP): Performed by: HOSPITALIST

## 2024-09-12 PROCEDURE — 2580000003 HC RX 258

## 2024-09-12 PROCEDURE — 1100000000 HC RM PRIVATE

## 2024-09-12 PROCEDURE — 36415 COLL VENOUS BLD VENIPUNCTURE: CPT

## 2024-09-12 PROCEDURE — 6360000002 HC RX W HCPCS: Performed by: FAMILY MEDICINE

## 2024-09-12 PROCEDURE — 2700000000 HC OXYGEN THERAPY PER DAY

## 2024-09-12 PROCEDURE — 82962 GLUCOSE BLOOD TEST: CPT

## 2024-09-12 RX ADMIN — CALCIUM CARBONATE 500 MG: 500 TABLET, CHEWABLE ORAL at 09:54

## 2024-09-12 RX ADMIN — CARVEDILOL 6.25 MG: 3.12 TABLET, FILM COATED ORAL at 09:55

## 2024-09-12 RX ADMIN — ZINC SULFATE 220 MG (50 MG) CAPSULE 50 MG: CAPSULE at 10:11

## 2024-09-12 RX ADMIN — BUMETANIDE 2 MG: 0.25 INJECTION INTRAMUSCULAR; INTRAVENOUS at 17:13

## 2024-09-12 RX ADMIN — ACETAMINOPHEN 650 MG: 325 TABLET ORAL at 20:39

## 2024-09-12 RX ADMIN — ATORVASTATIN CALCIUM 40 MG: 40 TABLET, FILM COATED ORAL at 20:30

## 2024-09-12 RX ADMIN — CLOTRIMAZOLE 10 MG: 10 LOZENGE ORAL at 06:01

## 2024-09-12 RX ADMIN — MIDODRINE HYDROCHLORIDE 10 MG: 5 TABLET ORAL at 09:53

## 2024-09-12 RX ADMIN — CARVEDILOL 6.25 MG: 3.12 TABLET, FILM COATED ORAL at 17:14

## 2024-09-12 RX ADMIN — CALCIUM CARBONATE 500 MG: 500 TABLET, CHEWABLE ORAL at 20:29

## 2024-09-12 RX ADMIN — Medication 400 UNITS: at 10:02

## 2024-09-12 RX ADMIN — GABAPENTIN 400 MG: 300 CAPSULE ORAL at 09:53

## 2024-09-12 RX ADMIN — TAMSULOSIN HYDROCHLORIDE 0.8 MG: 0.4 CAPSULE ORAL at 09:54

## 2024-09-12 RX ADMIN — PANTOPRAZOLE SODIUM 40 MG: 40 TABLET, DELAYED RELEASE ORAL at 06:01

## 2024-09-12 RX ADMIN — CLOTRIMAZOLE 10 MG: 10 LOZENGE ORAL at 20:28

## 2024-09-12 RX ADMIN — FLUTICASONE PROPIONATE 2 SPRAY: 50 SPRAY, METERED NASAL at 10:23

## 2024-09-12 RX ADMIN — WHITE PETROLATUM,ZINC OXIDE: 57; 17 PASTE TOPICAL at 22:12

## 2024-09-12 RX ADMIN — Medication 2 PUFF: at 08:41

## 2024-09-12 RX ADMIN — CLOTRIMAZOLE 10 MG: 10 LOZENGE ORAL at 14:45

## 2024-09-12 RX ADMIN — CLOTRIMAZOLE 10 MG: 10 LOZENGE ORAL at 22:20

## 2024-09-12 RX ADMIN — GABAPENTIN 400 MG: 300 CAPSULE ORAL at 13:08

## 2024-09-12 RX ADMIN — FERROUS SULFATE TAB 325 MG (65 MG ELEMENTAL FE) 325 MG: 325 (65 FE) TAB at 09:54

## 2024-09-12 RX ADMIN — BUMETANIDE 2 MG: 0.25 INJECTION INTRAMUSCULAR; INTRAVENOUS at 09:53

## 2024-09-12 RX ADMIN — CLOTRIMAZOLE 10 MG: 10 LOZENGE ORAL at 10:01

## 2024-09-12 RX ADMIN — DOCUSATE SODIUM 100 MG: 100 CAPSULE, LIQUID FILLED ORAL at 09:54

## 2024-09-12 RX ADMIN — MIDODRINE HYDROCHLORIDE 10 MG: 5 TABLET ORAL at 13:08

## 2024-09-12 RX ADMIN — CETIRIZINE HYDROCHLORIDE 10 MG: 10 TABLET, FILM COATED ORAL at 10:02

## 2024-09-12 RX ADMIN — DOCUSATE SODIUM 100 MG: 100 CAPSULE, LIQUID FILLED ORAL at 20:28

## 2024-09-12 RX ADMIN — ACETAMINOPHEN 650 MG: 325 TABLET ORAL at 10:10

## 2024-09-12 RX ADMIN — SODIUM CHLORIDE, PRESERVATIVE FREE 10 ML: 5 INJECTION INTRAVENOUS at 20:30

## 2024-09-12 RX ADMIN — HEPARIN SODIUM 5000 UNITS: 5000 INJECTION INTRAVENOUS; SUBCUTANEOUS at 13:07

## 2024-09-12 RX ADMIN — ASPIRIN 81 MG CHEWABLE TABLET 81 MG: 81 TABLET CHEWABLE at 09:55

## 2024-09-12 RX ADMIN — SODIUM CHLORIDE 1250 MG: 9 INJECTION, SOLUTION INTRAVENOUS at 10:12

## 2024-09-12 RX ADMIN — OXYCODONE HYDROCHLORIDE AND ACETAMINOPHEN 1000 MG: 500 TABLET ORAL at 10:01

## 2024-09-12 RX ADMIN — Medication 1 CAPSULE: at 10:01

## 2024-09-12 RX ADMIN — HEPARIN SODIUM 5000 UNITS: 5000 INJECTION INTRAVENOUS; SUBCUTANEOUS at 20:40

## 2024-09-12 RX ADMIN — Medication 2 PUFF: at 20:29

## 2024-09-12 RX ADMIN — MIDODRINE HYDROCHLORIDE 10 MG: 5 TABLET ORAL at 17:14

## 2024-09-12 RX ADMIN — Medication 1 CAPSULE: at 09:55

## 2024-09-12 RX ADMIN — OXYCODONE HYDROCHLORIDE AND ACETAMINOPHEN 1000 MG: 500 TABLET ORAL at 20:27

## 2024-09-12 RX ADMIN — HEPARIN SODIUM 5000 UNITS: 5000 INJECTION INTRAVENOUS; SUBCUTANEOUS at 04:27

## 2024-09-12 RX ADMIN — Medication 400 UNITS: at 20:33

## 2024-09-12 RX ADMIN — BUMETANIDE 2 MG: 0.25 INJECTION INTRAMUSCULAR; INTRAVENOUS at 13:08

## 2024-09-12 RX ADMIN — Medication 3 MG: at 20:28

## 2024-09-12 RX ADMIN — THERA TABS 1 TABLET: TAB at 09:55

## 2024-09-12 RX ADMIN — GABAPENTIN 400 MG: 300 CAPSULE ORAL at 22:14

## 2024-09-12 ASSESSMENT — PAIN SCALES - GENERAL
PAINLEVEL_OUTOF10: 0
PAINLEVEL_OUTOF10: 9

## 2024-09-12 ASSESSMENT — PAIN DESCRIPTION - LOCATION: LOCATION: GENERALIZED

## 2024-09-12 ASSESSMENT — PAIN DESCRIPTION - DESCRIPTORS: DESCRIPTORS: ACHING

## 2024-09-13 LAB
ALBUMIN SERPL-MCNC: 2.4 G/DL (ref 3.5–5)
ANION GAP SERPL CALC-SCNC: 8 MMOL/L (ref 2–12)
BUN SERPL-MCNC: 51 MG/DL (ref 6–20)
BUN/CREAT SERPL: 25 (ref 12–20)
CA-I BLD-MCNC: 9.3 MG/DL (ref 8.5–10.1)
CHLORIDE SERPL-SCNC: 109 MMOL/L (ref 97–108)
CO2 SERPL-SCNC: 27 MMOL/L (ref 21–32)
CREAT SERPL-MCNC: 2.07 MG/DL (ref 0.7–1.3)
GLUCOSE BLD STRIP.AUTO-MCNC: 156 MG/DL (ref 65–100)
GLUCOSE BLD STRIP.AUTO-MCNC: 181 MG/DL (ref 65–100)
GLUCOSE BLD STRIP.AUTO-MCNC: 194 MG/DL (ref 65–100)
GLUCOSE BLD STRIP.AUTO-MCNC: 213 MG/DL (ref 65–100)
GLUCOSE SERPL-MCNC: 128 MG/DL (ref 65–100)
PERFORMED BY:: ABNORMAL
PHOSPHATE SERPL-MCNC: 3.3 MG/DL (ref 2.6–4.7)
POTASSIUM SERPL-SCNC: 4.1 MMOL/L (ref 3.5–5.1)
SODIUM SERPL-SCNC: 144 MMOL/L (ref 136–145)

## 2024-09-13 PROCEDURE — 6370000000 HC RX 637 (ALT 250 FOR IP): Performed by: INTERNAL MEDICINE

## 2024-09-13 PROCEDURE — 6370000000 HC RX 637 (ALT 250 FOR IP)

## 2024-09-13 PROCEDURE — 2700000000 HC OXYGEN THERAPY PER DAY

## 2024-09-13 PROCEDURE — 6370000000 HC RX 637 (ALT 250 FOR IP): Performed by: FAMILY MEDICINE

## 2024-09-13 PROCEDURE — 2580000003 HC RX 258

## 2024-09-13 PROCEDURE — 1100000000 HC RM PRIVATE

## 2024-09-13 PROCEDURE — 36415 COLL VENOUS BLD VENIPUNCTURE: CPT

## 2024-09-13 PROCEDURE — 6360000002 HC RX W HCPCS: Performed by: INTERNAL MEDICINE

## 2024-09-13 PROCEDURE — 80069 RENAL FUNCTION PANEL: CPT

## 2024-09-13 PROCEDURE — 97530 THERAPEUTIC ACTIVITIES: CPT

## 2024-09-13 PROCEDURE — 6360000002 HC RX W HCPCS: Performed by: FAMILY MEDICINE

## 2024-09-13 PROCEDURE — 99232 SBSQ HOSP IP/OBS MODERATE 35: CPT | Performed by: INTERNAL MEDICINE

## 2024-09-13 PROCEDURE — 6370000000 HC RX 637 (ALT 250 FOR IP): Performed by: HOSPITALIST

## 2024-09-13 PROCEDURE — 82962 GLUCOSE BLOOD TEST: CPT

## 2024-09-13 PROCEDURE — 94640 AIRWAY INHALATION TREATMENT: CPT

## 2024-09-13 PROCEDURE — 94761 N-INVAS EAR/PLS OXIMETRY MLT: CPT

## 2024-09-13 RX ORDER — LIDOCAINE 4 G/G
1 PATCH TOPICAL DAILY
Status: DISCONTINUED | OUTPATIENT
Start: 2024-09-13 | End: 2024-09-18 | Stop reason: HOSPADM

## 2024-09-13 RX ADMIN — MIDODRINE HYDROCHLORIDE 10 MG: 5 TABLET ORAL at 13:39

## 2024-09-13 RX ADMIN — OXYCODONE HYDROCHLORIDE AND ACETAMINOPHEN 1000 MG: 500 TABLET ORAL at 10:31

## 2024-09-13 RX ADMIN — EPOETIN ALFA-EPBX 10000 UNITS: 10000 INJECTION, SOLUTION INTRAVENOUS; SUBCUTANEOUS at 22:57

## 2024-09-13 RX ADMIN — PANTOPRAZOLE SODIUM 40 MG: 40 TABLET, DELAYED RELEASE ORAL at 05:46

## 2024-09-13 RX ADMIN — CARVEDILOL 6.25 MG: 3.12 TABLET, FILM COATED ORAL at 18:11

## 2024-09-13 RX ADMIN — SODIUM CHLORIDE, PRESERVATIVE FREE 10 ML: 5 INJECTION INTRAVENOUS at 20:22

## 2024-09-13 RX ADMIN — CARVEDILOL 6.25 MG: 3.12 TABLET, FILM COATED ORAL at 10:45

## 2024-09-13 RX ADMIN — Medication 400 UNITS: at 20:24

## 2024-09-13 RX ADMIN — Medication 2 PUFF: at 20:53

## 2024-09-13 RX ADMIN — GABAPENTIN 400 MG: 300 CAPSULE ORAL at 20:31

## 2024-09-13 RX ADMIN — CALCIUM CARBONATE 500 MG: 500 TABLET, CHEWABLE ORAL at 10:26

## 2024-09-13 RX ADMIN — Medication 400 UNITS: at 10:30

## 2024-09-13 RX ADMIN — MIDODRINE HYDROCHLORIDE 10 MG: 5 TABLET ORAL at 10:29

## 2024-09-13 RX ADMIN — OXYCODONE HYDROCHLORIDE AND ACETAMINOPHEN 1000 MG: 500 TABLET ORAL at 20:23

## 2024-09-13 RX ADMIN — FLUTICASONE PROPIONATE 2 SPRAY: 50 SPRAY, METERED NASAL at 10:31

## 2024-09-13 RX ADMIN — CLOTRIMAZOLE 10 MG: 10 LOZENGE ORAL at 20:24

## 2024-09-13 RX ADMIN — BUMETANIDE 2 MG: 0.25 INJECTION INTRAMUSCULAR; INTRAVENOUS at 10:45

## 2024-09-13 RX ADMIN — ZINC SULFATE 220 MG (50 MG) CAPSULE 50 MG: CAPSULE at 10:30

## 2024-09-13 RX ADMIN — MIDODRINE HYDROCHLORIDE 10 MG: 5 TABLET ORAL at 18:10

## 2024-09-13 RX ADMIN — Medication 1 CAPSULE: at 13:42

## 2024-09-13 RX ADMIN — CLOTRIMAZOLE 10 MG: 10 LOZENGE ORAL at 05:46

## 2024-09-13 RX ADMIN — Medication 3 MG: at 20:24

## 2024-09-13 RX ADMIN — BUMETANIDE 2 MG: 0.25 INJECTION INTRAMUSCULAR; INTRAVENOUS at 18:11

## 2024-09-13 RX ADMIN — FERROUS SULFATE TAB 325 MG (65 MG ELEMENTAL FE) 325 MG: 325 (65 FE) TAB at 10:45

## 2024-09-13 RX ADMIN — ACETAMINOPHEN 650 MG: 325 TABLET ORAL at 22:53

## 2024-09-13 RX ADMIN — Medication 2 PUFF: at 07:54

## 2024-09-13 RX ADMIN — BUMETANIDE 2 MG: 0.25 INJECTION INTRAMUSCULAR; INTRAVENOUS at 13:42

## 2024-09-13 RX ADMIN — TAMSULOSIN HYDROCHLORIDE 0.8 MG: 0.4 CAPSULE ORAL at 10:26

## 2024-09-13 RX ADMIN — HEPARIN SODIUM 5000 UNITS: 5000 INJECTION INTRAVENOUS; SUBCUTANEOUS at 13:36

## 2024-09-13 RX ADMIN — ATORVASTATIN CALCIUM 40 MG: 40 TABLET, FILM COATED ORAL at 20:24

## 2024-09-13 RX ADMIN — ACETAMINOPHEN 650 MG: 325 TABLET ORAL at 10:28

## 2024-09-13 RX ADMIN — GABAPENTIN 400 MG: 300 CAPSULE ORAL at 13:36

## 2024-09-13 RX ADMIN — CLOTRIMAZOLE 10 MG: 10 LOZENGE ORAL at 15:16

## 2024-09-13 RX ADMIN — WHITE PETROLATUM,ZINC OXIDE: 57; 17 PASTE TOPICAL at 20:29

## 2024-09-13 RX ADMIN — HEPARIN SODIUM 5000 UNITS: 5000 INJECTION INTRAVENOUS; SUBCUTANEOUS at 04:13

## 2024-09-13 RX ADMIN — CETIRIZINE HYDROCHLORIDE 10 MG: 10 TABLET, FILM COATED ORAL at 10:29

## 2024-09-13 RX ADMIN — CLOTRIMAZOLE 10 MG: 10 LOZENGE ORAL at 22:50

## 2024-09-13 RX ADMIN — DOCUSATE SODIUM 100 MG: 100 CAPSULE, LIQUID FILLED ORAL at 20:25

## 2024-09-13 RX ADMIN — CLOTRIMAZOLE 10 MG: 10 LOZENGE ORAL at 10:30

## 2024-09-13 RX ADMIN — THERA TABS 1 TABLET: TAB at 10:30

## 2024-09-13 RX ADMIN — CALCIUM CARBONATE 500 MG: 500 TABLET, CHEWABLE ORAL at 20:23

## 2024-09-13 RX ADMIN — DOCUSATE SODIUM 100 MG: 100 CAPSULE, LIQUID FILLED ORAL at 10:26

## 2024-09-13 RX ADMIN — Medication 1 CAPSULE: at 10:31

## 2024-09-13 RX ADMIN — GABAPENTIN 400 MG: 300 CAPSULE ORAL at 10:30

## 2024-09-13 RX ADMIN — INSULIN LISPRO 2 UNITS: 100 INJECTION, SOLUTION INTRAVENOUS; SUBCUTANEOUS at 13:36

## 2024-09-13 RX ADMIN — HEPARIN SODIUM 5000 UNITS: 5000 INJECTION INTRAVENOUS; SUBCUTANEOUS at 20:25

## 2024-09-13 RX ADMIN — ASPIRIN 81 MG CHEWABLE TABLET 81 MG: 81 TABLET CHEWABLE at 10:30

## 2024-09-13 RX ADMIN — ACETAMINOPHEN 650 MG: 325 TABLET ORAL at 15:16

## 2024-09-13 ASSESSMENT — PAIN SCALES - GENERAL
PAINLEVEL_OUTOF10: 4
PAINLEVEL_OUTOF10: 4
PAINLEVEL_OUTOF10: 0
PAINLEVEL_OUTOF10: 3

## 2024-09-13 ASSESSMENT — PAIN DESCRIPTION - LOCATION: LOCATION: GENERALIZED

## 2024-09-13 ASSESSMENT — PAIN DESCRIPTION - DESCRIPTORS: DESCRIPTORS: ACHING

## 2024-09-14 LAB
ALBUMIN SERPL-MCNC: 2.4 G/DL (ref 3.5–5)
ANION GAP SERPL CALC-SCNC: 7 MMOL/L (ref 2–12)
BUN SERPL-MCNC: 48 MG/DL (ref 6–20)
BUN/CREAT SERPL: 25 (ref 12–20)
CA-I BLD-MCNC: 9.1 MG/DL (ref 8.5–10.1)
CHLORIDE SERPL-SCNC: 110 MMOL/L (ref 97–108)
CO2 SERPL-SCNC: 27 MMOL/L (ref 21–32)
CREAT SERPL-MCNC: 1.89 MG/DL (ref 0.7–1.3)
DATE LAST DOSE: ABNORMAL
DOSE AMOUNT: ABNORMAL UNITS
GLUCOSE BLD STRIP.AUTO-MCNC: 150 MG/DL (ref 65–100)
GLUCOSE BLD STRIP.AUTO-MCNC: 161 MG/DL (ref 65–100)
GLUCOSE BLD STRIP.AUTO-MCNC: 170 MG/DL (ref 65–100)
GLUCOSE BLD STRIP.AUTO-MCNC: 257 MG/DL (ref 65–100)
GLUCOSE SERPL-MCNC: 133 MG/DL (ref 65–100)
PERFORMED BY:: ABNORMAL
PHOSPHATE SERPL-MCNC: 3.2 MG/DL (ref 2.6–4.7)
POTASSIUM SERPL-SCNC: 4.1 MMOL/L (ref 3.5–5.1)
SODIUM SERPL-SCNC: 144 MMOL/L (ref 136–145)
VANCOMYCIN TROUGH SERPL-MCNC: 15.8 UG/ML (ref 5–10)

## 2024-09-14 PROCEDURE — 6370000000 HC RX 637 (ALT 250 FOR IP): Performed by: HOSPITALIST

## 2024-09-14 PROCEDURE — 6360000002 HC RX W HCPCS: Performed by: INTERNAL MEDICINE

## 2024-09-14 PROCEDURE — 2580000003 HC RX 258

## 2024-09-14 PROCEDURE — 1100000000 HC RM PRIVATE

## 2024-09-14 PROCEDURE — 97110 THERAPEUTIC EXERCISES: CPT

## 2024-09-14 PROCEDURE — 94640 AIRWAY INHALATION TREATMENT: CPT

## 2024-09-14 PROCEDURE — 2580000003 HC RX 258: Performed by: INTERNAL MEDICINE

## 2024-09-14 PROCEDURE — 6370000000 HC RX 637 (ALT 250 FOR IP)

## 2024-09-14 PROCEDURE — 6370000000 HC RX 637 (ALT 250 FOR IP): Performed by: FAMILY MEDICINE

## 2024-09-14 PROCEDURE — 80202 ASSAY OF VANCOMYCIN: CPT

## 2024-09-14 PROCEDURE — 80069 RENAL FUNCTION PANEL: CPT

## 2024-09-14 PROCEDURE — 6370000000 HC RX 637 (ALT 250 FOR IP): Performed by: INTERNAL MEDICINE

## 2024-09-14 PROCEDURE — 97530 THERAPEUTIC ACTIVITIES: CPT

## 2024-09-14 PROCEDURE — 2700000000 HC OXYGEN THERAPY PER DAY

## 2024-09-14 PROCEDURE — 82962 GLUCOSE BLOOD TEST: CPT

## 2024-09-14 PROCEDURE — 6360000002 HC RX W HCPCS: Performed by: FAMILY MEDICINE

## 2024-09-14 PROCEDURE — 36415 COLL VENOUS BLD VENIPUNCTURE: CPT

## 2024-09-14 PROCEDURE — 94761 N-INVAS EAR/PLS OXIMETRY MLT: CPT

## 2024-09-14 RX ADMIN — CLOTRIMAZOLE 10 MG: 10 LOZENGE ORAL at 05:29

## 2024-09-14 RX ADMIN — HYOSCYAMINE SULFATE: 16 SOLUTION at 09:34

## 2024-09-14 RX ADMIN — CETIRIZINE HYDROCHLORIDE 10 MG: 10 TABLET, FILM COATED ORAL at 09:32

## 2024-09-14 RX ADMIN — INSULIN LISPRO 4 UNITS: 100 INJECTION, SOLUTION INTRAVENOUS; SUBCUTANEOUS at 12:49

## 2024-09-14 RX ADMIN — THERA TABS 1 TABLET: TAB at 09:31

## 2024-09-14 RX ADMIN — CLOTRIMAZOLE 10 MG: 10 LOZENGE ORAL at 09:32

## 2024-09-14 RX ADMIN — ASPIRIN 81 MG CHEWABLE TABLET 81 MG: 81 TABLET CHEWABLE at 09:32

## 2024-09-14 RX ADMIN — Medication 1 CAPSULE: at 09:32

## 2024-09-14 RX ADMIN — SODIUM CHLORIDE, PRESERVATIVE FREE 10 ML: 5 INJECTION INTRAVENOUS at 09:33

## 2024-09-14 RX ADMIN — CALCIUM CARBONATE 500 MG: 500 TABLET, CHEWABLE ORAL at 09:32

## 2024-09-14 RX ADMIN — ATORVASTATIN CALCIUM 40 MG: 40 TABLET, FILM COATED ORAL at 21:50

## 2024-09-14 RX ADMIN — Medication 400 UNITS: at 09:31

## 2024-09-14 RX ADMIN — DOCUSATE SODIUM 100 MG: 100 CAPSULE, LIQUID FILLED ORAL at 21:49

## 2024-09-14 RX ADMIN — VANCOMYCIN HYDROCHLORIDE 1500 MG: 750 INJECTION, POWDER, LYOPHILIZED, FOR SOLUTION INTRAVENOUS at 11:04

## 2024-09-14 RX ADMIN — MIDODRINE HYDROCHLORIDE 10 MG: 5 TABLET ORAL at 17:38

## 2024-09-14 RX ADMIN — ZINC SULFATE 220 MG (50 MG) CAPSULE 50 MG: CAPSULE at 09:31

## 2024-09-14 RX ADMIN — Medication 3 MG: at 21:51

## 2024-09-14 RX ADMIN — Medication 1 CAPSULE: at 09:31

## 2024-09-14 RX ADMIN — SENNOSIDES 8.6 MG: 8.6 TABLET, FILM COATED ORAL at 09:31

## 2024-09-14 RX ADMIN — CARVEDILOL 6.25 MG: 3.12 TABLET, FILM COATED ORAL at 17:38

## 2024-09-14 RX ADMIN — FERROUS SULFATE TAB 325 MG (65 MG ELEMENTAL FE) 325 MG: 325 (65 FE) TAB at 09:31

## 2024-09-14 RX ADMIN — CALCIUM CARBONATE 500 MG: 500 TABLET, CHEWABLE ORAL at 21:50

## 2024-09-14 RX ADMIN — SODIUM CHLORIDE, PRESERVATIVE FREE 10 ML: 5 INJECTION INTRAVENOUS at 21:53

## 2024-09-14 RX ADMIN — PANTOPRAZOLE SODIUM 40 MG: 40 TABLET, DELAYED RELEASE ORAL at 05:29

## 2024-09-14 RX ADMIN — CLOTRIMAZOLE 10 MG: 10 LOZENGE ORAL at 21:51

## 2024-09-14 RX ADMIN — ACETAMINOPHEN 650 MG: 325 TABLET ORAL at 17:38

## 2024-09-14 RX ADMIN — FLUTICASONE PROPIONATE 2 SPRAY: 50 SPRAY, METERED NASAL at 09:33

## 2024-09-14 RX ADMIN — HEPARIN SODIUM 5000 UNITS: 5000 INJECTION INTRAVENOUS; SUBCUTANEOUS at 12:50

## 2024-09-14 RX ADMIN — WHITE PETROLATUM,ZINC OXIDE: 57; 17 PASTE TOPICAL at 09:34

## 2024-09-14 RX ADMIN — HEPARIN SODIUM 5000 UNITS: 5000 INJECTION INTRAVENOUS; SUBCUTANEOUS at 04:43

## 2024-09-14 RX ADMIN — MIDODRINE HYDROCHLORIDE 10 MG: 5 TABLET ORAL at 12:50

## 2024-09-14 RX ADMIN — GABAPENTIN 400 MG: 300 CAPSULE ORAL at 21:50

## 2024-09-14 RX ADMIN — ACETAMINOPHEN 650 MG: 325 TABLET ORAL at 09:51

## 2024-09-14 RX ADMIN — ACETAMINOPHEN 650 MG: 325 TABLET ORAL at 22:11

## 2024-09-14 RX ADMIN — GABAPENTIN 400 MG: 300 CAPSULE ORAL at 12:49

## 2024-09-14 RX ADMIN — CARVEDILOL 6.25 MG: 3.12 TABLET, FILM COATED ORAL at 09:31

## 2024-09-14 RX ADMIN — TAMSULOSIN HYDROCHLORIDE 0.8 MG: 0.4 CAPSULE ORAL at 09:31

## 2024-09-14 RX ADMIN — MIDODRINE HYDROCHLORIDE 10 MG: 5 TABLET ORAL at 09:31

## 2024-09-14 RX ADMIN — Medication 400 UNITS: at 21:51

## 2024-09-14 RX ADMIN — OXYCODONE HYDROCHLORIDE AND ACETAMINOPHEN 1000 MG: 500 TABLET ORAL at 09:32

## 2024-09-14 RX ADMIN — GABAPENTIN 400 MG: 300 CAPSULE ORAL at 09:30

## 2024-09-14 RX ADMIN — BUMETANIDE 2 MG: 0.25 INJECTION INTRAMUSCULAR; INTRAVENOUS at 09:39

## 2024-09-14 RX ADMIN — Medication 2 PUFF: at 19:35

## 2024-09-14 RX ADMIN — OXYCODONE HYDROCHLORIDE AND ACETAMINOPHEN 1000 MG: 500 TABLET ORAL at 21:49

## 2024-09-14 RX ADMIN — POLYETHYLENE GLYCOL 3350 17 G: 17 POWDER, FOR SOLUTION ORAL at 09:29

## 2024-09-14 RX ADMIN — HEPARIN SODIUM 5000 UNITS: 5000 INJECTION INTRAVENOUS; SUBCUTANEOUS at 21:51

## 2024-09-14 RX ADMIN — BUMETANIDE 2 MG: 0.25 INJECTION INTRAMUSCULAR; INTRAVENOUS at 17:38

## 2024-09-14 RX ADMIN — BUMETANIDE 2 MG: 0.25 INJECTION INTRAMUSCULAR; INTRAVENOUS at 12:49

## 2024-09-14 RX ADMIN — CLOTRIMAZOLE 10 MG: 10 LOZENGE ORAL at 19:12

## 2024-09-14 RX ADMIN — DOCUSATE SODIUM 100 MG: 100 CAPSULE, LIQUID FILLED ORAL at 09:32

## 2024-09-14 RX ADMIN — CLOTRIMAZOLE 10 MG: 10 LOZENGE ORAL at 15:05

## 2024-09-14 ASSESSMENT — PAIN SCALES - GENERAL
PAINLEVEL_OUTOF10: 9
PAINLEVEL_OUTOF10: 0
PAINLEVEL_OUTOF10: 9
PAINLEVEL_OUTOF10: 8

## 2024-09-14 ASSESSMENT — PAIN DESCRIPTION - LOCATION
LOCATION: GENERALIZED
LOCATION: GENERALIZED
LOCATION: KNEE

## 2024-09-14 ASSESSMENT — PAIN DESCRIPTION - DESCRIPTORS
DESCRIPTORS: ACHING
DESCRIPTORS: ACHING;SHARP
DESCRIPTORS: ACHING

## 2024-09-14 ASSESSMENT — PAIN DESCRIPTION - ORIENTATION: ORIENTATION: LEFT

## 2024-09-15 LAB
ALBUMIN SERPL-MCNC: 2.4 G/DL (ref 3.5–5)
ANION GAP SERPL CALC-SCNC: 7 MMOL/L (ref 2–12)
BUN SERPL-MCNC: 47 MG/DL (ref 6–20)
BUN/CREAT SERPL: 28 (ref 12–20)
CA-I BLD-MCNC: 8.8 MG/DL (ref 8.5–10.1)
CHLORIDE SERPL-SCNC: 110 MMOL/L (ref 97–108)
CO2 SERPL-SCNC: 26 MMOL/L (ref 21–32)
CREAT SERPL-MCNC: 1.7 MG/DL (ref 0.7–1.3)
GLUCOSE BLD STRIP.AUTO-MCNC: 119 MG/DL (ref 65–100)
GLUCOSE BLD STRIP.AUTO-MCNC: 150 MG/DL (ref 65–100)
GLUCOSE BLD STRIP.AUTO-MCNC: 195 MG/DL (ref 65–100)
GLUCOSE BLD STRIP.AUTO-MCNC: 214 MG/DL (ref 65–100)
GLUCOSE SERPL-MCNC: 94 MG/DL (ref 65–100)
PERFORMED BY:: ABNORMAL
PHOSPHATE SERPL-MCNC: 3.3 MG/DL (ref 2.6–4.7)
POTASSIUM SERPL-SCNC: 3.9 MMOL/L (ref 3.5–5.1)
SODIUM SERPL-SCNC: 143 MMOL/L (ref 136–145)

## 2024-09-15 PROCEDURE — 36415 COLL VENOUS BLD VENIPUNCTURE: CPT

## 2024-09-15 PROCEDURE — 82962 GLUCOSE BLOOD TEST: CPT

## 2024-09-15 PROCEDURE — 6370000000 HC RX 637 (ALT 250 FOR IP): Performed by: INTERNAL MEDICINE

## 2024-09-15 PROCEDURE — 6370000000 HC RX 637 (ALT 250 FOR IP): Performed by: HOSPITALIST

## 2024-09-15 PROCEDURE — 2580000003 HC RX 258

## 2024-09-15 PROCEDURE — 6370000000 HC RX 637 (ALT 250 FOR IP)

## 2024-09-15 PROCEDURE — 94640 AIRWAY INHALATION TREATMENT: CPT

## 2024-09-15 PROCEDURE — 6370000000 HC RX 637 (ALT 250 FOR IP): Performed by: FAMILY MEDICINE

## 2024-09-15 PROCEDURE — 2700000000 HC OXYGEN THERAPY PER DAY

## 2024-09-15 PROCEDURE — 1100000000 HC RM PRIVATE

## 2024-09-15 PROCEDURE — 94761 N-INVAS EAR/PLS OXIMETRY MLT: CPT

## 2024-09-15 PROCEDURE — 6360000002 HC RX W HCPCS: Performed by: FAMILY MEDICINE

## 2024-09-15 PROCEDURE — 6360000002 HC RX W HCPCS: Performed by: INTERNAL MEDICINE

## 2024-09-15 PROCEDURE — 80069 RENAL FUNCTION PANEL: CPT

## 2024-09-15 RX ADMIN — INSULIN LISPRO 2 UNITS: 100 INJECTION, SOLUTION INTRAVENOUS; SUBCUTANEOUS at 13:01

## 2024-09-15 RX ADMIN — POLYETHYLENE GLYCOL 3350 17 G: 17 POWDER, FOR SOLUTION ORAL at 10:15

## 2024-09-15 RX ADMIN — Medication 3 MG: at 21:01

## 2024-09-15 RX ADMIN — Medication 1 CAPSULE: at 10:14

## 2024-09-15 RX ADMIN — CLOTRIMAZOLE 10 MG: 10 LOZENGE ORAL at 23:30

## 2024-09-15 RX ADMIN — BUMETANIDE 2 MG: 0.25 INJECTION INTRAMUSCULAR; INTRAVENOUS at 17:13

## 2024-09-15 RX ADMIN — WHITE PETROLATUM,ZINC OXIDE: 57; 17 PASTE TOPICAL at 10:28

## 2024-09-15 RX ADMIN — TAMSULOSIN HYDROCHLORIDE 0.8 MG: 0.4 CAPSULE ORAL at 10:15

## 2024-09-15 RX ADMIN — ACETAMINOPHEN 650 MG: 325 TABLET ORAL at 23:30

## 2024-09-15 RX ADMIN — Medication 2 PUFF: at 19:20

## 2024-09-15 RX ADMIN — THERA TABS 1 TABLET: TAB at 10:15

## 2024-09-15 RX ADMIN — PANTOPRAZOLE SODIUM 40 MG: 40 TABLET, DELAYED RELEASE ORAL at 06:10

## 2024-09-15 RX ADMIN — ACETAMINOPHEN 650 MG: 325 TABLET ORAL at 08:04

## 2024-09-15 RX ADMIN — Medication 400 UNITS: at 10:15

## 2024-09-15 RX ADMIN — SODIUM CHLORIDE, PRESERVATIVE FREE 10 ML: 5 INJECTION INTRAVENOUS at 10:27

## 2024-09-15 RX ADMIN — GABAPENTIN 400 MG: 300 CAPSULE ORAL at 13:01

## 2024-09-15 RX ADMIN — Medication 2 PUFF: at 07:38

## 2024-09-15 RX ADMIN — SODIUM CHLORIDE, PRESERVATIVE FREE 10 ML: 5 INJECTION INTRAVENOUS at 21:05

## 2024-09-15 RX ADMIN — CLOTRIMAZOLE 10 MG: 10 LOZENGE ORAL at 19:25

## 2024-09-15 RX ADMIN — MIDODRINE HYDROCHLORIDE 10 MG: 5 TABLET ORAL at 17:13

## 2024-09-15 RX ADMIN — Medication 1 CAPSULE: at 08:04

## 2024-09-15 RX ADMIN — Medication 2 PUFF: at 07:39

## 2024-09-15 RX ADMIN — MIDODRINE HYDROCHLORIDE 10 MG: 5 TABLET ORAL at 08:04

## 2024-09-15 RX ADMIN — ACETAMINOPHEN 650 MG: 325 TABLET ORAL at 17:18

## 2024-09-15 RX ADMIN — OXYCODONE HYDROCHLORIDE AND ACETAMINOPHEN 1000 MG: 500 TABLET ORAL at 10:14

## 2024-09-15 RX ADMIN — ATORVASTATIN CALCIUM 40 MG: 40 TABLET, FILM COATED ORAL at 21:01

## 2024-09-15 RX ADMIN — ZINC SULFATE 220 MG (50 MG) CAPSULE 50 MG: CAPSULE at 10:15

## 2024-09-15 RX ADMIN — GABAPENTIN 400 MG: 300 CAPSULE ORAL at 21:01

## 2024-09-15 RX ADMIN — CALCIUM CARBONATE 500 MG: 500 TABLET, CHEWABLE ORAL at 10:14

## 2024-09-15 RX ADMIN — CETIRIZINE HYDROCHLORIDE 10 MG: 10 TABLET, FILM COATED ORAL at 10:15

## 2024-09-15 RX ADMIN — WHITE PETROLATUM,ZINC OXIDE: 57; 17 PASTE TOPICAL at 23:35

## 2024-09-15 RX ADMIN — ASPIRIN 81 MG CHEWABLE TABLET 81 MG: 81 TABLET CHEWABLE at 10:15

## 2024-09-15 RX ADMIN — MIDODRINE HYDROCHLORIDE 10 MG: 5 TABLET ORAL at 13:01

## 2024-09-15 RX ADMIN — Medication 400 UNITS: at 21:01

## 2024-09-15 RX ADMIN — GABAPENTIN 400 MG: 300 CAPSULE ORAL at 10:14

## 2024-09-15 RX ADMIN — BUMETANIDE 2 MG: 0.25 INJECTION INTRAMUSCULAR; INTRAVENOUS at 13:01

## 2024-09-15 RX ADMIN — DOCUSATE SODIUM 100 MG: 100 CAPSULE, LIQUID FILLED ORAL at 10:14

## 2024-09-15 RX ADMIN — FLUTICASONE PROPIONATE 2 SPRAY: 50 SPRAY, METERED NASAL at 10:22

## 2024-09-15 RX ADMIN — CARVEDILOL 6.25 MG: 3.12 TABLET, FILM COATED ORAL at 08:04

## 2024-09-15 RX ADMIN — OXYCODONE HYDROCHLORIDE AND ACETAMINOPHEN 1000 MG: 500 TABLET ORAL at 21:00

## 2024-09-15 RX ADMIN — CARVEDILOL 6.25 MG: 3.12 TABLET, FILM COATED ORAL at 17:13

## 2024-09-15 RX ADMIN — HEPARIN SODIUM 5000 UNITS: 5000 INJECTION INTRAVENOUS; SUBCUTANEOUS at 06:10

## 2024-09-15 RX ADMIN — CLOTRIMAZOLE 10 MG: 10 LOZENGE ORAL at 10:20

## 2024-09-15 RX ADMIN — FERROUS SULFATE TAB 325 MG (65 MG ELEMENTAL FE) 325 MG: 325 (65 FE) TAB at 08:04

## 2024-09-15 RX ADMIN — SENNOSIDES 8.6 MG: 8.6 TABLET, FILM COATED ORAL at 10:14

## 2024-09-15 RX ADMIN — CLOTRIMAZOLE 10 MG: 10 LOZENGE ORAL at 17:13

## 2024-09-15 RX ADMIN — CALCIUM CARBONATE 500 MG: 500 TABLET, CHEWABLE ORAL at 21:01

## 2024-09-15 RX ADMIN — BUMETANIDE 2 MG: 0.25 INJECTION INTRAMUSCULAR; INTRAVENOUS at 08:04

## 2024-09-15 RX ADMIN — HYOSCYAMINE SULFATE: 16 SOLUTION at 10:28

## 2024-09-15 RX ADMIN — HEPARIN SODIUM 5000 UNITS: 5000 INJECTION INTRAVENOUS; SUBCUTANEOUS at 21:02

## 2024-09-15 RX ADMIN — CLOTRIMAZOLE 10 MG: 10 LOZENGE ORAL at 06:10

## 2024-09-15 RX ADMIN — HEPARIN SODIUM 5000 UNITS: 5000 INJECTION INTRAVENOUS; SUBCUTANEOUS at 13:01

## 2024-09-15 ASSESSMENT — PAIN SCALES - GENERAL
PAINLEVEL_OUTOF10: 0
PAINLEVEL_OUTOF10: 9
PAINLEVEL_OUTOF10: 9
PAINLEVEL_OUTOF10: 8
PAINLEVEL_OUTOF10: 9

## 2024-09-15 ASSESSMENT — PAIN DESCRIPTION - DESCRIPTORS
DESCRIPTORS: ACHING
DESCRIPTORS: ACHING

## 2024-09-15 ASSESSMENT — PAIN DESCRIPTION - LOCATION
LOCATION: GENERALIZED

## 2024-09-16 LAB
ALBUMIN SERPL-MCNC: 2.5 G/DL (ref 3.5–5)
ANION GAP SERPL CALC-SCNC: 8 MMOL/L (ref 2–12)
BUN SERPL-MCNC: 45 MG/DL (ref 6–20)
BUN/CREAT SERPL: 27 (ref 12–20)
CA-I BLD-MCNC: 8.8 MG/DL (ref 8.5–10.1)
CHLORIDE SERPL-SCNC: 111 MMOL/L (ref 97–108)
CO2 SERPL-SCNC: 26 MMOL/L (ref 21–32)
CREAT SERPL-MCNC: 1.67 MG/DL (ref 0.7–1.3)
DATE LAST DOSE: NORMAL
DOSE AMOUNT: NORMAL UNITS
GLUCOSE BLD STRIP.AUTO-MCNC: 113 MG/DL (ref 65–100)
GLUCOSE BLD STRIP.AUTO-MCNC: 159 MG/DL (ref 65–100)
GLUCOSE BLD STRIP.AUTO-MCNC: 182 MG/DL (ref 65–100)
GLUCOSE BLD STRIP.AUTO-MCNC: 184 MG/DL (ref 65–100)
GLUCOSE SERPL-MCNC: 102 MG/DL (ref 65–100)
PERFORMED BY:: ABNORMAL
PHOSPHATE SERPL-MCNC: 3.2 MG/DL (ref 2.6–4.7)
POTASSIUM SERPL-SCNC: 3.9 MMOL/L (ref 3.5–5.1)
SODIUM SERPL-SCNC: 145 MMOL/L (ref 136–145)
VANCOMYCIN SERPL-MCNC: 14.7 UG/ML

## 2024-09-16 PROCEDURE — 2580000003 HC RX 258

## 2024-09-16 PROCEDURE — 6370000000 HC RX 637 (ALT 250 FOR IP): Performed by: INTERNAL MEDICINE

## 2024-09-16 PROCEDURE — 6370000000 HC RX 637 (ALT 250 FOR IP): Performed by: FAMILY MEDICINE

## 2024-09-16 PROCEDURE — 82962 GLUCOSE BLOOD TEST: CPT

## 2024-09-16 PROCEDURE — 1100000000 HC RM PRIVATE

## 2024-09-16 PROCEDURE — 94761 N-INVAS EAR/PLS OXIMETRY MLT: CPT

## 2024-09-16 PROCEDURE — 80202 ASSAY OF VANCOMYCIN: CPT

## 2024-09-16 PROCEDURE — 6370000000 HC RX 637 (ALT 250 FOR IP)

## 2024-09-16 PROCEDURE — 6370000000 HC RX 637 (ALT 250 FOR IP): Performed by: HOSPITALIST

## 2024-09-16 PROCEDURE — 6360000002 HC RX W HCPCS

## 2024-09-16 PROCEDURE — 99232 SBSQ HOSP IP/OBS MODERATE 35: CPT | Performed by: INTERNAL MEDICINE

## 2024-09-16 PROCEDURE — 2700000000 HC OXYGEN THERAPY PER DAY

## 2024-09-16 PROCEDURE — 36415 COLL VENOUS BLD VENIPUNCTURE: CPT

## 2024-09-16 PROCEDURE — 6360000002 HC RX W HCPCS: Performed by: FAMILY MEDICINE

## 2024-09-16 PROCEDURE — 97530 THERAPEUTIC ACTIVITIES: CPT

## 2024-09-16 PROCEDURE — 80069 RENAL FUNCTION PANEL: CPT

## 2024-09-16 PROCEDURE — 6360000002 HC RX W HCPCS: Performed by: INTERNAL MEDICINE

## 2024-09-16 PROCEDURE — 94640 AIRWAY INHALATION TREATMENT: CPT

## 2024-09-16 RX ORDER — BUMETANIDE 0.25 MG/ML
2 INJECTION INTRAMUSCULAR; INTRAVENOUS 3 TIMES DAILY
Qty: 30 EACH | Refills: 0 | Status: SHIPPED | OUTPATIENT
Start: 2024-09-16

## 2024-09-16 RX ORDER — CARVEDILOL 6.25 MG/1
6.25 TABLET ORAL 2 TIMES DAILY WITH MEALS
Qty: 60 TABLET | Refills: 3 | Status: SHIPPED | OUTPATIENT
Start: 2024-09-16

## 2024-09-16 RX ADMIN — CARVEDILOL 6.25 MG: 3.12 TABLET, FILM COATED ORAL at 18:00

## 2024-09-16 RX ADMIN — WHITE PETROLATUM,ZINC OXIDE: 57; 17 PASTE TOPICAL at 09:22

## 2024-09-16 RX ADMIN — VANCOMYCIN HYDROCHLORIDE 1500 MG: 750 INJECTION, POWDER, LYOPHILIZED, FOR SOLUTION INTRAVENOUS at 10:38

## 2024-09-16 RX ADMIN — POLYETHYLENE GLYCOL 3350 17 G: 17 POWDER, FOR SOLUTION ORAL at 09:12

## 2024-09-16 RX ADMIN — SENNOSIDES 8.6 MG: 8.6 TABLET, FILM COATED ORAL at 09:11

## 2024-09-16 RX ADMIN — ACETAMINOPHEN 650 MG: 325 TABLET ORAL at 15:46

## 2024-09-16 RX ADMIN — SODIUM CHLORIDE, PRESERVATIVE FREE 10 ML: 5 INJECTION INTRAVENOUS at 09:12

## 2024-09-16 RX ADMIN — HEPARIN SODIUM 5000 UNITS: 5000 INJECTION INTRAVENOUS; SUBCUTANEOUS at 20:51

## 2024-09-16 RX ADMIN — BUMETANIDE 2 MG: 0.25 INJECTION INTRAMUSCULAR; INTRAVENOUS at 09:10

## 2024-09-16 RX ADMIN — Medication 400 UNITS: at 20:50

## 2024-09-16 RX ADMIN — PANTOPRAZOLE SODIUM 40 MG: 40 TABLET, DELAYED RELEASE ORAL at 06:05

## 2024-09-16 RX ADMIN — OXYCODONE HYDROCHLORIDE AND ACETAMINOPHEN 1000 MG: 500 TABLET ORAL at 09:12

## 2024-09-16 RX ADMIN — MIDODRINE HYDROCHLORIDE 10 MG: 5 TABLET ORAL at 12:38

## 2024-09-16 RX ADMIN — CLOTRIMAZOLE 10 MG: 10 LOZENGE ORAL at 06:05

## 2024-09-16 RX ADMIN — Medication 400 UNITS: at 09:11

## 2024-09-16 RX ADMIN — FERROUS SULFATE TAB 325 MG (65 MG ELEMENTAL FE) 325 MG: 325 (65 FE) TAB at 09:11

## 2024-09-16 RX ADMIN — HEPARIN SODIUM 5000 UNITS: 5000 INJECTION INTRAVENOUS; SUBCUTANEOUS at 04:12

## 2024-09-16 RX ADMIN — Medication 2 PUFF: at 08:01

## 2024-09-16 RX ADMIN — Medication 3 MG: at 20:51

## 2024-09-16 RX ADMIN — MIDODRINE HYDROCHLORIDE 10 MG: 5 TABLET ORAL at 17:59

## 2024-09-16 RX ADMIN — FLUTICASONE PROPIONATE 2 SPRAY: 50 SPRAY, METERED NASAL at 09:10

## 2024-09-16 RX ADMIN — ACETAMINOPHEN 650 MG: 325 TABLET ORAL at 03:18

## 2024-09-16 RX ADMIN — CARVEDILOL 6.25 MG: 3.12 TABLET, FILM COATED ORAL at 09:11

## 2024-09-16 RX ADMIN — CLOTRIMAZOLE 10 MG: 10 LOZENGE ORAL at 23:13

## 2024-09-16 RX ADMIN — CALCIUM CARBONATE 500 MG: 500 TABLET, CHEWABLE ORAL at 09:11

## 2024-09-16 RX ADMIN — CALCIUM CARBONATE 500 MG: 500 TABLET, CHEWABLE ORAL at 20:51

## 2024-09-16 RX ADMIN — CLOTRIMAZOLE 10 MG: 10 LOZENGE ORAL at 18:54

## 2024-09-16 RX ADMIN — DOCUSATE SODIUM 100 MG: 100 CAPSULE, LIQUID FILLED ORAL at 20:51

## 2024-09-16 RX ADMIN — Medication 2 PUFF: at 20:07

## 2024-09-16 RX ADMIN — ACETAMINOPHEN 650 MG: 325 TABLET ORAL at 09:26

## 2024-09-16 RX ADMIN — EPOETIN ALFA-EPBX 10000 UNITS: 10000 INJECTION, SOLUTION INTRAVENOUS; SUBCUTANEOUS at 18:22

## 2024-09-16 RX ADMIN — CETIRIZINE HYDROCHLORIDE 10 MG: 10 TABLET, FILM COATED ORAL at 09:11

## 2024-09-16 RX ADMIN — ZINC SULFATE 220 MG (50 MG) CAPSULE 50 MG: CAPSULE at 09:11

## 2024-09-16 RX ADMIN — HEPARIN SODIUM 5000 UNITS: 5000 INJECTION INTRAVENOUS; SUBCUTANEOUS at 12:38

## 2024-09-16 RX ADMIN — OXYCODONE HYDROCHLORIDE AND ACETAMINOPHEN 1000 MG: 500 TABLET ORAL at 20:51

## 2024-09-16 RX ADMIN — WHITE PETROLATUM,ZINC OXIDE: 57; 17 PASTE TOPICAL at 20:53

## 2024-09-16 RX ADMIN — GABAPENTIN 400 MG: 300 CAPSULE ORAL at 09:11

## 2024-09-16 RX ADMIN — GABAPENTIN 400 MG: 300 CAPSULE ORAL at 15:48

## 2024-09-16 RX ADMIN — ATORVASTATIN CALCIUM 40 MG: 40 TABLET, FILM COATED ORAL at 20:51

## 2024-09-16 RX ADMIN — DOCUSATE SODIUM 100 MG: 100 CAPSULE, LIQUID FILLED ORAL at 09:11

## 2024-09-16 RX ADMIN — BUMETANIDE 2 MG: 0.25 INJECTION INTRAMUSCULAR; INTRAVENOUS at 17:59

## 2024-09-16 RX ADMIN — Medication 1 CAPSULE: at 09:11

## 2024-09-16 RX ADMIN — THERA TABS 1 TABLET: TAB at 09:12

## 2024-09-16 RX ADMIN — ACETAMINOPHEN 650 MG: 325 TABLET ORAL at 20:51

## 2024-09-16 RX ADMIN — CLOTRIMAZOLE 10 MG: 10 LOZENGE ORAL at 15:48

## 2024-09-16 RX ADMIN — TAMSULOSIN HYDROCHLORIDE 0.8 MG: 0.4 CAPSULE ORAL at 09:11

## 2024-09-16 RX ADMIN — ASPIRIN 81 MG CHEWABLE TABLET 81 MG: 81 TABLET CHEWABLE at 09:11

## 2024-09-16 RX ADMIN — MIDODRINE HYDROCHLORIDE 10 MG: 5 TABLET ORAL at 09:10

## 2024-09-16 RX ADMIN — GABAPENTIN 400 MG: 300 CAPSULE ORAL at 20:50

## 2024-09-16 RX ADMIN — CLOTRIMAZOLE 10 MG: 10 LOZENGE ORAL at 09:11

## 2024-09-16 RX ADMIN — Medication 2 PUFF: at 08:02

## 2024-09-16 RX ADMIN — BUMETANIDE 2 MG: 0.25 INJECTION INTRAMUSCULAR; INTRAVENOUS at 12:38

## 2024-09-16 RX ADMIN — HYOSCYAMINE SULFATE: 16 SOLUTION at 09:23

## 2024-09-16 ASSESSMENT — PAIN SCALES - GENERAL
PAINLEVEL_OUTOF10: 9
PAINLEVEL_OUTOF10: 8
PAINLEVEL_OUTOF10: 7
PAINLEVEL_OUTOF10: 8
PAINLEVEL_OUTOF10: 5

## 2024-09-16 ASSESSMENT — PAIN DESCRIPTION - DESCRIPTORS
DESCRIPTORS: ACHING
DESCRIPTORS: ACHING

## 2024-09-16 ASSESSMENT — PAIN DESCRIPTION - LOCATION
LOCATION: GENERALIZED
LOCATION: GENERALIZED

## 2024-09-17 LAB
ANION GAP SERPL CALC-SCNC: 6 MMOL/L (ref 2–12)
BUN SERPL-MCNC: 40 MG/DL (ref 6–20)
BUN/CREAT SERPL: 24 (ref 12–20)
CA-I BLD-MCNC: 8.5 MG/DL (ref 8.5–10.1)
CHLORIDE SERPL-SCNC: 111 MMOL/L (ref 97–108)
CO2 SERPL-SCNC: 28 MMOL/L (ref 21–32)
CREAT SERPL-MCNC: 1.66 MG/DL (ref 0.7–1.3)
GLUCOSE BLD STRIP.AUTO-MCNC: 127 MG/DL (ref 65–100)
GLUCOSE BLD STRIP.AUTO-MCNC: 142 MG/DL (ref 65–100)
GLUCOSE BLD STRIP.AUTO-MCNC: 160 MG/DL (ref 65–100)
GLUCOSE BLD STRIP.AUTO-MCNC: 189 MG/DL (ref 65–100)
GLUCOSE BLD STRIP.AUTO-MCNC: 319 MG/DL (ref 65–100)
GLUCOSE SERPL-MCNC: 108 MG/DL (ref 65–100)
PERFORMED BY:: ABNORMAL
POTASSIUM SERPL-SCNC: 3.9 MMOL/L (ref 3.5–5.1)
SODIUM SERPL-SCNC: 145 MMOL/L (ref 136–145)

## 2024-09-17 PROCEDURE — 6370000000 HC RX 637 (ALT 250 FOR IP): Performed by: FAMILY MEDICINE

## 2024-09-17 PROCEDURE — 6370000000 HC RX 637 (ALT 250 FOR IP): Performed by: INTERNAL MEDICINE

## 2024-09-17 PROCEDURE — 97530 THERAPEUTIC ACTIVITIES: CPT

## 2024-09-17 PROCEDURE — 6360000002 HC RX W HCPCS: Performed by: FAMILY MEDICINE

## 2024-09-17 PROCEDURE — 94640 AIRWAY INHALATION TREATMENT: CPT

## 2024-09-17 PROCEDURE — 2580000003 HC RX 258

## 2024-09-17 PROCEDURE — 2700000000 HC OXYGEN THERAPY PER DAY

## 2024-09-17 PROCEDURE — 94667 MNPJ CHEST WALL 1ST: CPT

## 2024-09-17 PROCEDURE — 94761 N-INVAS EAR/PLS OXIMETRY MLT: CPT

## 2024-09-17 PROCEDURE — 6360000002 HC RX W HCPCS: Performed by: INTERNAL MEDICINE

## 2024-09-17 PROCEDURE — 6370000000 HC RX 637 (ALT 250 FOR IP)

## 2024-09-17 PROCEDURE — 1100000000 HC RM PRIVATE

## 2024-09-17 PROCEDURE — 82962 GLUCOSE BLOOD TEST: CPT

## 2024-09-17 PROCEDURE — 80048 BASIC METABOLIC PNL TOTAL CA: CPT

## 2024-09-17 PROCEDURE — 36415 COLL VENOUS BLD VENIPUNCTURE: CPT

## 2024-09-17 PROCEDURE — 6370000000 HC RX 637 (ALT 250 FOR IP): Performed by: HOSPITALIST

## 2024-09-17 RX ADMIN — FERROUS SULFATE TAB 325 MG (65 MG ELEMENTAL FE) 325 MG: 325 (65 FE) TAB at 10:56

## 2024-09-17 RX ADMIN — CARVEDILOL 6.25 MG: 3.12 TABLET, FILM COATED ORAL at 16:14

## 2024-09-17 RX ADMIN — ATORVASTATIN CALCIUM 40 MG: 40 TABLET, FILM COATED ORAL at 20:45

## 2024-09-17 RX ADMIN — MIDODRINE HYDROCHLORIDE 10 MG: 5 TABLET ORAL at 10:48

## 2024-09-17 RX ADMIN — THERA TABS 1 TABLET: TAB at 10:48

## 2024-09-17 RX ADMIN — GABAPENTIN 400 MG: 300 CAPSULE ORAL at 10:48

## 2024-09-17 RX ADMIN — CLOTRIMAZOLE 10 MG: 10 LOZENGE ORAL at 20:44

## 2024-09-17 RX ADMIN — Medication 400 UNITS: at 20:44

## 2024-09-17 RX ADMIN — Medication 3 MG: at 20:45

## 2024-09-17 RX ADMIN — CETIRIZINE HYDROCHLORIDE 10 MG: 10 TABLET, FILM COATED ORAL at 10:49

## 2024-09-17 RX ADMIN — GABAPENTIN 400 MG: 300 CAPSULE ORAL at 20:46

## 2024-09-17 RX ADMIN — OXYCODONE HYDROCHLORIDE AND ACETAMINOPHEN 1000 MG: 500 TABLET ORAL at 10:45

## 2024-09-17 RX ADMIN — Medication 2 PUFF: at 20:10

## 2024-09-17 RX ADMIN — DOCUSATE SODIUM 100 MG: 100 CAPSULE, LIQUID FILLED ORAL at 10:49

## 2024-09-17 RX ADMIN — MIDODRINE HYDROCHLORIDE 10 MG: 5 TABLET ORAL at 16:14

## 2024-09-17 RX ADMIN — CARVEDILOL 6.25 MG: 3.12 TABLET, FILM COATED ORAL at 10:56

## 2024-09-17 RX ADMIN — GABAPENTIN 400 MG: 300 CAPSULE ORAL at 14:36

## 2024-09-17 RX ADMIN — CALCIUM CARBONATE 500 MG: 500 TABLET, CHEWABLE ORAL at 10:45

## 2024-09-17 RX ADMIN — CALCIUM CARBONATE 500 MG: 500 TABLET, CHEWABLE ORAL at 20:45

## 2024-09-17 RX ADMIN — Medication 400 UNITS: at 10:49

## 2024-09-17 RX ADMIN — SODIUM CHLORIDE, PRESERVATIVE FREE 10 ML: 5 INJECTION INTRAVENOUS at 10:51

## 2024-09-17 RX ADMIN — CLOTRIMAZOLE 10 MG: 10 LOZENGE ORAL at 10:46

## 2024-09-17 RX ADMIN — CLOTRIMAZOLE 10 MG: 10 LOZENGE ORAL at 14:36

## 2024-09-17 RX ADMIN — Medication 2 PUFF: at 08:12

## 2024-09-17 RX ADMIN — BUMETANIDE 2 MG: 0.25 INJECTION INTRAMUSCULAR; INTRAVENOUS at 10:55

## 2024-09-17 RX ADMIN — BUMETANIDE 2 MG: 0.25 INJECTION INTRAMUSCULAR; INTRAVENOUS at 16:14

## 2024-09-17 RX ADMIN — HEPARIN SODIUM 5000 UNITS: 5000 INJECTION INTRAVENOUS; SUBCUTANEOUS at 10:49

## 2024-09-17 RX ADMIN — PANTOPRAZOLE SODIUM 40 MG: 40 TABLET, DELAYED RELEASE ORAL at 06:34

## 2024-09-17 RX ADMIN — ACETAMINOPHEN 650 MG: 325 TABLET ORAL at 20:52

## 2024-09-17 RX ADMIN — FLUTICASONE PROPIONATE 2 SPRAY: 50 SPRAY, METERED NASAL at 10:50

## 2024-09-17 RX ADMIN — HYOSCYAMINE SULFATE: 16 SOLUTION at 10:52

## 2024-09-17 RX ADMIN — Medication 1 CAPSULE: at 10:45

## 2024-09-17 RX ADMIN — WHITE PETROLATUM,ZINC OXIDE: 57; 17 PASTE TOPICAL at 10:51

## 2024-09-17 RX ADMIN — WHITE PETROLATUM,ZINC OXIDE: 57; 17 PASTE TOPICAL at 21:00

## 2024-09-17 RX ADMIN — OXYCODONE HYDROCHLORIDE AND ACETAMINOPHEN 1000 MG: 500 TABLET ORAL at 20:45

## 2024-09-17 RX ADMIN — SODIUM CHLORIDE, PRESERVATIVE FREE 10 ML: 5 INJECTION INTRAVENOUS at 20:54

## 2024-09-17 RX ADMIN — CLOTRIMAZOLE 10 MG: 10 LOZENGE ORAL at 06:34

## 2024-09-17 RX ADMIN — SENNOSIDES 8.6 MG: 8.6 TABLET, FILM COATED ORAL at 10:49

## 2024-09-17 RX ADMIN — ASPIRIN 81 MG CHEWABLE TABLET 81 MG: 81 TABLET CHEWABLE at 10:49

## 2024-09-17 RX ADMIN — HEPARIN SODIUM 5000 UNITS: 5000 INJECTION INTRAVENOUS; SUBCUTANEOUS at 03:51

## 2024-09-17 RX ADMIN — TAMSULOSIN HYDROCHLORIDE 0.8 MG: 0.4 CAPSULE ORAL at 10:49

## 2024-09-17 RX ADMIN — ZINC SULFATE 220 MG (50 MG) CAPSULE 50 MG: CAPSULE at 10:49

## 2024-09-17 RX ADMIN — HEPARIN SODIUM 5000 UNITS: 5000 INJECTION INTRAVENOUS; SUBCUTANEOUS at 20:30

## 2024-09-17 RX ADMIN — POLYETHYLENE GLYCOL 3350 17 G: 17 POWDER, FOR SOLUTION ORAL at 10:44

## 2024-09-17 RX ADMIN — ACETAMINOPHEN 650 MG: 325 TABLET ORAL at 10:45

## 2024-09-17 ASSESSMENT — PAIN SCALES - GENERAL
PAINLEVEL_OUTOF10: 0
PAINLEVEL_OUTOF10: 3
PAINLEVEL_OUTOF10: 0
PAINLEVEL_OUTOF10: 3

## 2024-09-17 ASSESSMENT — PAIN DESCRIPTION - LOCATION
LOCATION: GENERALIZED
LOCATION: LEG;HAND

## 2024-09-17 ASSESSMENT — PAIN DESCRIPTION - DESCRIPTORS
DESCRIPTORS: ACHING
DESCRIPTORS: DISCOMFORT

## 2024-09-18 VITALS
BODY MASS INDEX: 44.1 KG/M2 | HEART RATE: 100 BPM | HEIGHT: 71 IN | OXYGEN SATURATION: 99 % | RESPIRATION RATE: 20 BRPM | TEMPERATURE: 98.2 F | SYSTOLIC BLOOD PRESSURE: 132 MMHG | DIASTOLIC BLOOD PRESSURE: 75 MMHG | WEIGHT: 315 LBS

## 2024-09-18 LAB
ANION GAP SERPL CALC-SCNC: 6 MMOL/L (ref 2–12)
BUN SERPL-MCNC: 36 MG/DL (ref 6–20)
BUN/CREAT SERPL: 24 (ref 12–20)
CA-I BLD-MCNC: 8.6 MG/DL (ref 8.5–10.1)
CHLORIDE SERPL-SCNC: 111 MMOL/L (ref 97–108)
CO2 SERPL-SCNC: 25 MMOL/L (ref 21–32)
CREAT SERPL-MCNC: 1.47 MG/DL (ref 0.7–1.3)
GLUCOSE BLD STRIP.AUTO-MCNC: 114 MG/DL (ref 65–100)
GLUCOSE BLD STRIP.AUTO-MCNC: 166 MG/DL (ref 65–100)
GLUCOSE BLD STRIP.AUTO-MCNC: 211 MG/DL (ref 65–100)
GLUCOSE SERPL-MCNC: 101 MG/DL (ref 65–100)
PERFORMED BY:: ABNORMAL
POTASSIUM SERPL-SCNC: 4 MMOL/L (ref 3.5–5.1)
SODIUM SERPL-SCNC: 142 MMOL/L (ref 136–145)

## 2024-09-18 PROCEDURE — 6370000000 HC RX 637 (ALT 250 FOR IP): Performed by: INTERNAL MEDICINE

## 2024-09-18 PROCEDURE — 6370000000 HC RX 637 (ALT 250 FOR IP): Performed by: FAMILY MEDICINE

## 2024-09-18 PROCEDURE — 94761 N-INVAS EAR/PLS OXIMETRY MLT: CPT

## 2024-09-18 PROCEDURE — 82962 GLUCOSE BLOOD TEST: CPT

## 2024-09-18 PROCEDURE — 6370000000 HC RX 637 (ALT 250 FOR IP)

## 2024-09-18 PROCEDURE — 6360000002 HC RX W HCPCS: Performed by: INTERNAL MEDICINE

## 2024-09-18 PROCEDURE — 6360000002 HC RX W HCPCS: Performed by: FAMILY MEDICINE

## 2024-09-18 PROCEDURE — 2580000003 HC RX 258: Performed by: INTERNAL MEDICINE

## 2024-09-18 PROCEDURE — 6370000000 HC RX 637 (ALT 250 FOR IP): Performed by: HOSPITALIST

## 2024-09-18 PROCEDURE — 94640 AIRWAY INHALATION TREATMENT: CPT

## 2024-09-18 PROCEDURE — 80048 BASIC METABOLIC PNL TOTAL CA: CPT

## 2024-09-18 PROCEDURE — 2700000000 HC OXYGEN THERAPY PER DAY

## 2024-09-18 PROCEDURE — 2580000003 HC RX 258

## 2024-09-18 PROCEDURE — 36415 COLL VENOUS BLD VENIPUNCTURE: CPT

## 2024-09-18 RX ORDER — LINEZOLID 600 MG/1
600 TABLET, FILM COATED ORAL 2 TIMES DAILY
Qty: 28 TABLET | Refills: 0 | Status: SHIPPED | OUTPATIENT
Start: 2024-09-18 | End: 2024-10-02

## 2024-09-18 RX ADMIN — CLOTRIMAZOLE 10 MG: 10 LOZENGE ORAL at 06:05

## 2024-09-18 RX ADMIN — WHITE PETROLATUM,ZINC OXIDE: 57; 17 PASTE TOPICAL at 10:30

## 2024-09-18 RX ADMIN — INSULIN LISPRO 2 UNITS: 100 INJECTION, SOLUTION INTRAVENOUS; SUBCUTANEOUS at 13:33

## 2024-09-18 RX ADMIN — OXYCODONE HYDROCHLORIDE AND ACETAMINOPHEN 1000 MG: 500 TABLET ORAL at 10:25

## 2024-09-18 RX ADMIN — VANCOMYCIN HYDROCHLORIDE 1750 MG: 1 INJECTION, POWDER, LYOPHILIZED, FOR SOLUTION INTRAVENOUS at 11:07

## 2024-09-18 RX ADMIN — HEPARIN SODIUM 5000 UNITS: 5000 INJECTION INTRAVENOUS; SUBCUTANEOUS at 10:28

## 2024-09-18 RX ADMIN — Medication 2 PUFF: at 08:43

## 2024-09-18 RX ADMIN — ZINC SULFATE 220 MG (50 MG) CAPSULE 50 MG: CAPSULE at 10:26

## 2024-09-18 RX ADMIN — SODIUM CHLORIDE, PRESERVATIVE FREE 10 ML: 5 INJECTION INTRAVENOUS at 10:32

## 2024-09-18 RX ADMIN — BUMETANIDE 2 MG: 0.25 INJECTION INTRAMUSCULAR; INTRAVENOUS at 10:28

## 2024-09-18 RX ADMIN — FERROUS SULFATE TAB 325 MG (65 MG ELEMENTAL FE) 325 MG: 325 (65 FE) TAB at 10:49

## 2024-09-18 RX ADMIN — SENNOSIDES 8.6 MG: 8.6 TABLET, FILM COATED ORAL at 10:28

## 2024-09-18 RX ADMIN — Medication 1 CAPSULE: at 10:24

## 2024-09-18 RX ADMIN — HYOSCYAMINE SULFATE: 16 SOLUTION at 10:31

## 2024-09-18 RX ADMIN — GABAPENTIN 400 MG: 300 CAPSULE ORAL at 13:34

## 2024-09-18 RX ADMIN — ASPIRIN 81 MG CHEWABLE TABLET 81 MG: 81 TABLET CHEWABLE at 10:22

## 2024-09-18 RX ADMIN — Medication 400 UNITS: at 10:22

## 2024-09-18 RX ADMIN — GABAPENTIN 400 MG: 300 CAPSULE ORAL at 10:23

## 2024-09-18 RX ADMIN — FLUTICASONE PROPIONATE 2 SPRAY: 50 SPRAY, METERED NASAL at 10:32

## 2024-09-18 RX ADMIN — ACETAMINOPHEN 650 MG: 325 TABLET ORAL at 16:41

## 2024-09-18 RX ADMIN — PANTOPRAZOLE SODIUM 40 MG: 40 TABLET, DELAYED RELEASE ORAL at 06:05

## 2024-09-18 RX ADMIN — CARVEDILOL 6.25 MG: 3.12 TABLET, FILM COATED ORAL at 10:24

## 2024-09-18 RX ADMIN — CLOTRIMAZOLE 10 MG: 10 LOZENGE ORAL at 13:34

## 2024-09-18 RX ADMIN — POLYETHYLENE GLYCOL 3350 17 G: 17 POWDER, FOR SOLUTION ORAL at 10:21

## 2024-09-18 RX ADMIN — CETIRIZINE HYDROCHLORIDE 10 MG: 10 TABLET, FILM COATED ORAL at 10:22

## 2024-09-18 RX ADMIN — CLOTRIMAZOLE 10 MG: 10 LOZENGE ORAL at 10:49

## 2024-09-18 RX ADMIN — DOCUSATE SODIUM 100 MG: 100 CAPSULE, LIQUID FILLED ORAL at 10:25

## 2024-09-18 RX ADMIN — TAMSULOSIN HYDROCHLORIDE 0.8 MG: 0.4 CAPSULE ORAL at 10:24

## 2024-09-18 RX ADMIN — CALCIUM CARBONATE 500 MG: 500 TABLET, CHEWABLE ORAL at 10:27

## 2024-09-18 RX ADMIN — CLOTRIMAZOLE 10 MG: 10 LOZENGE ORAL at 00:14

## 2024-09-18 RX ADMIN — BUMETANIDE 2 MG: 0.25 INJECTION INTRAMUSCULAR; INTRAVENOUS at 13:34

## 2024-09-18 RX ADMIN — HEPARIN SODIUM 5000 UNITS: 5000 INJECTION INTRAVENOUS; SUBCUTANEOUS at 04:30

## 2024-09-18 RX ADMIN — ACETAMINOPHEN 650 MG: 325 TABLET ORAL at 02:51

## 2024-09-18 RX ADMIN — Medication 1 CAPSULE: at 10:22

## 2024-09-18 RX ADMIN — THERA TABS 1 TABLET: TAB at 10:22

## 2024-09-18 ASSESSMENT — PAIN SCALES - GENERAL
PAINLEVEL_OUTOF10: 3
PAINLEVEL_OUTOF10: 3

## 2024-09-18 ASSESSMENT — PAIN DESCRIPTION - DESCRIPTORS: DESCRIPTORS: ACHING

## 2024-09-18 ASSESSMENT — PAIN DESCRIPTION - LOCATION: LOCATION: LEG

## 2024-09-18 ASSESSMENT — PAIN DESCRIPTION - ORIENTATION: ORIENTATION: RIGHT;LEFT

## 2024-09-24 ENCOUNTER — FOLLOWUP TELEPHONE ENCOUNTER (OUTPATIENT)
Facility: HOSPITAL | Age: 69
End: 2024-09-24

## 2024-10-17 ENCOUNTER — HOSPITAL ENCOUNTER (OUTPATIENT)
Facility: HOSPITAL | Age: 69
Discharge: HOME | End: 2024-10-17
Attending: PODIATRIST
Payer: MEDICARE

## 2024-10-17 VITALS
TEMPERATURE: 97.8 F | SYSTOLIC BLOOD PRESSURE: 138 MMHG | DIASTOLIC BLOOD PRESSURE: 80 MMHG | RESPIRATION RATE: 20 BRPM | HEART RATE: 89 BPM | OXYGEN SATURATION: 97 %

## 2024-10-17 PROCEDURE — 11045 DBRDMT SUBQ TISS EACH ADDL: CPT

## 2024-10-17 PROCEDURE — 11042 DBRDMT SUBQ TIS 1ST 20SQCM/<: CPT

## 2024-10-17 PROCEDURE — 99214 OFFICE O/P EST MOD 30 MIN: CPT

## 2024-10-17 RX ORDER — BUMETANIDE 1 MG/1
2 TABLET ORAL DAILY
COMMUNITY

## 2024-10-17 ASSESSMENT — PAIN DESCRIPTION - ORIENTATION: ORIENTATION: RIGHT;LEFT

## 2024-10-17 ASSESSMENT — PAIN SCALES - GENERAL: PAINLEVEL_OUTOF10: 7

## 2024-10-17 ASSESSMENT — PAIN DESCRIPTION - LOCATION: LOCATION: LEG

## 2024-10-17 ASSESSMENT — PAIN DESCRIPTION - DESCRIPTORS: DESCRIPTORS: ACHING;SORE

## 2024-10-17 NOTE — DISCHARGE INSTRUCTIONS
[phone]. Our hours are Monday - Friday 8am - 4:30pm, closed all major holidays. If you need help with your wound outside these hours and cannot wait until we are again available, contact your PCP or go to the hospital emergency room.      PLEASE NOTE: IF YOU ARE UNABLE TO OBTAIN WOUND SUPPLIES, CONTINUE TO USE THE SUPPLIES YOU HAVE AVAILABLE UNTIL YOU ARE ABLE TO REACH US. IT IS MOST IMPORTANT TO KEEP THE WOUND COVERED AT ALL TIMES.

## 2024-10-18 ASSESSMENT — ENCOUNTER SYMPTOMS
ABDOMINAL PAIN: 0
SHORTNESS OF BREATH: 0
VOMITING: 0
DIARRHEA: 0

## 2024-10-18 NOTE — WOUND CARE
Multilayer Compression Wrap   (Not Unna) Below the Knee    NAME:  Shantanu Casillas  YOB: 1955  MEDICAL RECORD NUMBER:  [id number]  DATE:  10/17/2024    Multilayer compression wrap: Removed old Multilayer wrap if indicated and wash leg with mild soap/water.  Applied moisturizing agent to dry skin as needed.   Applied primary and secondary dressing as ordered.  Applied multilayered dressing below the knee to right lower leg.  Applied multilayered dressing below the knee to left lower leg.  Instructed patient/caregiver not to remove dressing and to keep it clean and dry.   Instructed patient/caregiver on complications to report to provider, such as pain, numbness in toes, heavy drainage, and slippage of dressing.  Instructed patient on purpose of compression dressing and on activity and exercise recommendations.      Electronically signed by Chiara Damon LPN on 10/17/2024 at 3:31 PM  
Other:     Contact Cast:  Apply: [] Total Contact Cast Applied in Clinic to []RightLeg []Left Leg   [] Do not get cast wet.  Contact wound center or go to emergency room if there is a foul odor or becomes uncomfortable due to feeling tight or swelling.  Do not use objects inside of cast to scratch.      Dietary:  [x] Diet as tolerated [x] Diabetic Diet [] No Added Salt  [] Increase Protein [] Other:     Activity:  [x] Activity as tolerated  [] Patient has no activity restrictions      [] Strict Bedrest   [] Remain off Work:       [] May return to full duty work                                    [] Return to work with restrictions:     Physician:  [] Dr. Drea Moore  [] Dr. Carol Green   [] Dr. Martin Zheng  [] Orion Garces PA-C  [] Dr. Wei Saenz  [x] Dr. Polo Oviedo  [] Dr. Rosalee Toney    Nurse Case Manger:  Jeni \"T\"      Wound Care Center Information: Should you experience any significant changes in your wound(s) or have questions about your wound care, please contact the Wound Center at [phone]. Our hours are Monday - Friday 8am - 4:30pm, closed all major holidays. If you need help with your wound outside these hours and cannot wait until we are again available, contact your PCP or go to the hospital emergency room.     PLEASE NOTE: IF YOU ARE UNABLE TO OBTAIN WOUND SUPPLIES, CONTINUE TO USE THE SUPPLIES YOU HAVE AVAILABLE UNTIL YOU ARE ABLE TO REACH US. IT IS MOST IMPORTANT TO KEEP THE WOUND COVERED AT ALL TIMES.     
10/17/24 1438   Post-Procedure Length (cm) 10 cm 10/17/24 1510   Post-Procedure Width (cm) 7 cm 10/17/24 1510   Post-Procedure Depth (cm) 0.1 cm 10/17/24 1510   Post-Procedure Surface Area (cm^2) 70 cm^2 10/17/24 1510   Post-Procedure Volume (cm^3) 7 cm^3 10/17/24 1510   Wound Assessment Granulation tissue;Subcutaneous 10/17/24 1438   Drainage Amount Moderate (25-50%) 10/17/24 1438   Drainage Description Serosanguinous 10/17/24 1438   Odor None 10/17/24 1438   Emy-wound Assessment Maceration 10/17/24 1438   Margins Attached edges 10/17/24 1438   Wound Thickness Description not for Pressure Injury Full thickness 10/17/24 1438   Number of days: 1       Wound 10/17/24 Leg Right;Medial #2 (Active)   Wound Image   10/17/24 1438   Wound Etiology Venous 10/17/24 1438   Dressing Status Clean;Dry;Intact 10/17/24 1438   Wound Cleansed Soap and water 10/17/24 1438   Wound Length (cm) 8 cm 10/17/24 1438   Wound Width (cm) 2 cm 10/17/24 1438   Wound Depth (cm) 0.1 cm 10/17/24 1438   Wound Surface Area (cm^2) 16 cm^2 10/17/24 1438   Wound Volume (cm^3) 1.6 cm^3 10/17/24 1438   Post-Procedure Length (cm) 8 cm 10/17/24 1510   Post-Procedure Width (cm) 2 cm 10/17/24 1510   Post-Procedure Depth (cm) 0.1 cm 10/17/24 1510   Post-Procedure Surface Area (cm^2) 16 cm^2 10/17/24 1510   Post-Procedure Volume (cm^3) 1.6 cm^3 10/17/24 1510   Wound Assessment Granulation tissue;Slough 10/17/24 1438   Drainage Amount Moderate (25-50%) 10/17/24 1438   Drainage Description Serosanguinous 10/17/24 1438   Odor None 10/17/24 1438   Emy-wound Assessment Maceration 10/17/24 1438   Margins Attached edges 10/17/24 1438   Wound Thickness Description not for Pressure Injury Full thickness 10/17/24 1438   Number of days: 1       Wound 10/17/24 Leg Left;Lateral #3 (Active)   Wound Image   10/17/24 1438   Wound Etiology Venous 10/17/24 1438   Dressing Status Clean;Dry;Intact 10/17/24 1438   Wound Cleansed Soap and water 10/17/24 1438   Wound Length (cm)

## 2024-10-31 ENCOUNTER — HOSPITAL ENCOUNTER (OUTPATIENT)
Facility: HOSPITAL | Age: 69
Discharge: HOME | End: 2024-10-31
Attending: PODIATRIST
Payer: MEDICARE

## 2024-10-31 VITALS
OXYGEN SATURATION: 98 % | HEART RATE: 108 BPM | DIASTOLIC BLOOD PRESSURE: 58 MMHG | SYSTOLIC BLOOD PRESSURE: 104 MMHG | RESPIRATION RATE: 20 BRPM | TEMPERATURE: 97.4 F

## 2024-10-31 PROCEDURE — 11045 DBRDMT SUBQ TISS EACH ADDL: CPT

## 2024-10-31 PROCEDURE — 11042 DBRDMT SUBQ TIS 1ST 20SQCM/<: CPT

## 2024-10-31 PROCEDURE — 17250 CHEM CAUT OF GRANLTJ TISSUE: CPT

## 2024-10-31 ASSESSMENT — PAIN DESCRIPTION - LOCATION: LOCATION: LEG

## 2024-10-31 ASSESSMENT — PAIN SCALES - GENERAL: PAINLEVEL_OUTOF10: 7

## 2024-10-31 ASSESSMENT — PAIN DESCRIPTION - DESCRIPTORS: DESCRIPTORS: ACHING

## 2024-10-31 ASSESSMENT — PAIN DESCRIPTION - ORIENTATION: ORIENTATION: RIGHT

## 2024-10-31 NOTE — DISCHARGE INSTRUCTIONS
Jeni Walker RN  Registered Nurse     Wound Care      Signed     Date of Service: 10/31/2024  2:45 PM     Signed                          Wound Clinic Physician Orders and Discharge Instructions  [facility]  [facility], [address]  [place] [address]  Telephone: [phone]     FAX [phone]     NAME:  Shantanu Casillas  YOB: 1955  MEDICAL RECORD NUMBER:  [id number]  DATE:  10/31/2024        Return Appointment:     [] Dressing Supply Provider:   [x] Home [facility]: [facility]  [x] Return Appointment: 2 Week(s)  [] Nurse Visit:      [] Discharge from [facility]:   [] Healed        [] Refer to Provider:         [] Consult     Follow-up Information:     [] Ordered Tests:    [] Vascular/Arterial Testing    [] Please call [phone] to schedule at any [facility] facility       [] Imaging                          [] Please call [phone] to schedule at any [facility] facility     [] Referrals:     [] Infectious Disease             [] Vascular                 [] Other:     [] Rx to pharmacy:   [] Would Culture obtained in clinic  [] Other:         Wound Cleansing:   Do not scrub or use excessive force.  Cleanse wound prior to applying a clean dressing with:     [x] Normal Saline          [x] Mild Soap & Water     [] Keep Wound Dry in Shower  [] Wear a cast cover to shower  [] Other:        Topical Treatments:  Do not apply lotions, creams, or ointments to wound bed unless directed.      [] Apply moisturizing lotion to skin surrounding the wound prior to dressing change.  [] Apply thin film of moisture barrier ointment (Zinc) to skin immediately around wound.  [] Apply Betadine to skin immediately around wound   [] Apply Skin Prep to skin immediately around wound  [] Other:                 Dressings:                  Wound Location All wounds expect one listed below      [x] Apply Primary Dressing:                                          [] MediHoney Gel  [] Calcium Alginate

## 2024-10-31 NOTE — WOUND CARE
or have questions about your wound care, please contact the Wound Center at [phone]. Our hours are Monday - Friday 8am - 4:30pm, closed all major holidays. If you need help with your wound outside these hours and cannot wait until we are again available, contact your PCP or go to the hospital emergency room.     PLEASE NOTE: IF YOU ARE UNABLE TO OBTAIN WOUND SUPPLIES, CONTINUE TO USE THE SUPPLIES YOU HAVE AVAILABLE UNTIL YOU ARE ABLE TO REACH US. IT IS MOST IMPORTANT TO KEEP THE WOUND COVERED AT ALL TIMES.

## 2024-11-19 ENCOUNTER — HOSPITAL ENCOUNTER (OUTPATIENT)
Facility: HOSPITAL | Age: 69
Discharge: HOME | End: 2024-11-19
Attending: PODIATRIST
Payer: MEDICARE

## 2024-11-19 VITALS
TEMPERATURE: 97 F | DIASTOLIC BLOOD PRESSURE: 77 MMHG | HEART RATE: 46 BPM | RESPIRATION RATE: 20 BRPM | SYSTOLIC BLOOD PRESSURE: 112 MMHG

## 2024-11-19 DIAGNOSIS — L97.922 CHRONIC ULCER OF LEFT LEG, WITH FAT LAYER EXPOSED (HCC): ICD-10-CM

## 2024-11-19 DIAGNOSIS — L97.812 NON-PRESSURE CHRONIC ULCER OF OTHER PART OF RIGHT LOWER LEG WITH FAT LAYER EXPOSED (HCC): Primary | ICD-10-CM

## 2024-11-19 PROCEDURE — 11045 DBRDMT SUBQ TISS EACH ADDL: CPT

## 2024-11-19 PROCEDURE — 11042 DBRDMT SUBQ TIS 1ST 20SQCM/<: CPT

## 2024-11-19 NOTE — PROGRESS NOTES
[facility]   Wound Care and [facility] O[facility] Therapy Center   Medical Staff Progress Note     Shantanu Casillas  MEDICAL RECORD NUMBER:  [id number]  AGE: 69 y.o.   GENDER: male  : 1955  EPISODE DATE:  2024    Chief complaint and reason for visit:     Chief Complaint   Patient presents with    Wound Check     BLE      Patient presenting for follow up evaluation of wound(s) per chief complaint.      Subjective and ROS: Symptoms, wound related issues, or other pertinent wound history since last visit: no new wound care issues. Tolerating current treatment. No systemic complaints above baseline.    History of Wound Context: Per original history and physical on this patient. Changes in history since last evaluation: none    Medical Decision Making:     Problem List Items Addressed This Visit          Other    Chronic ulcer of left leg, with fat layer exposed (HCC)    Non-pressure chronic ulcer of other part of right lower leg with fat layer exposed (HCC) - Primary       Wounds and Treatment Plan:  Wounds are all measuring smaller, but are draining more.  Debrided, change to Aquacel AG.  RTC weekly    Other associated diagnoses or problems addressed:  none    Pertinent imaging reviewed including independent interpretation include:  None    Pertinent labs reviewed.   Medical records and review of external note (s) from other providers done as well.    New lab or imaging orders placed:  None     Prescription drug management: N/A     Discussion of management or test interpretation with N/A.    Comorbid conditions affecting wound healing: As per PMH which was reviewed.    Risk of complications and/or mortality of patient management:  This patient has a minimal risk of morbidity and mortality from additional diagnostic testing or treatment. This is due to the above conditions affecting wound healing as well as patient and procedure risk factors. Education and discussion held with patient

## 2024-11-19 NOTE — WOUND CARE
Multilayer Compression Wrap   (Not Unna) Below the Knee    NAME:  Shantanu Casillas  YOB: 1955  MEDICAL RECORD NUMBER:  [id number]  DATE:  11/19/2024    Multilayer compression wrap: Removed old Multilayer wrap if indicated and wash leg with mild soap/water.  Applied moisturizing agent to dry skin as needed.   Applied primary and secondary dressing as ordered.  Applied multilayered dressing below the knee to right lower leg.  Applied multilayered dressing below the knee to left lower leg.  Instructed patient/caregiver not to remove dressing and to keep it clean and dry.   Instructed patient/caregiver on complications to report to provider, such as pain, numbness in toes, heavy drainage, and slippage of dressing.  Instructed patient on purpose of compression dressing and on activity and exercise recommendations.      Electronically signed by Breanna Rod RN on 11/19/2024 at 11:33 AMMultilayer Compression Wrap   (Not Unna) Below the Knee    NAME:  Shantanu Casillas  YOB: 1955  MEDICAL RECORD NUMBER:  [id number]  DATE:  11/19/2024    Multilayer compression wrap: Removed old Multilayer wrap if indicated and wash leg with mild soap/water.  Applied moisturizing agent to dry skin as needed.   Applied primary and secondary dressing as ordered.  Applied multilayered dressing below the knee to right lower leg.  Applied multilayered dressing below the knee to left lower leg.  Instructed patient/caregiver not to remove dressing and to keep it clean and dry.   Instructed patient/caregiver on complications to report to provider, such as pain, numbness in toes, heavy drainage, and slippage of dressing.      Electronically signed by Breanna Rod RN on 11/19/2024 at 11:33 AM

## 2024-11-19 NOTE — DISCHARGE INSTRUCTIONS
Maryuri Martines RN  Registered Nurse     Wound Care      Signed     Date of Service: 11/19/2024 11:00 AM     Signed                          Wound Clinic Physician Orders and Discharge Instructions  [facility]  [facility] [address], [address]  [place] [id number]  Telephone: [phone]     FAX [phone]     NAME:  Shantanu Casillas  YOB: 1955  MEDICAL RECORD NUMBER:  [id number]  DATE:  11/19/2024        Return Appointment:     [] Dressing Supply Provider:   [x] Home Healthcare: [facility]  [x] Return Appointment: 3 Week(s)  [] Nurse Visit:      [] Discharge from [facility]:   [] Healed        [] Refer to Provider:         [] Consult     Follow-up Information:     [] Ordered Tests:    [] Vascular/Arterial Testing    [] Please call [phone] to schedule at any [facility] facility       [] Imaging                          [] Please call [phone] to schedule at any [facility] facility     [] Referrals:     [] Infectious Disease             [] Vascular                 [] Other:     [] Rx to pharmacy:   [] Would Culture obtained in clinic  [] Other:         Wound Cleansing:   Do not scrub or use excessive force.  Cleanse wound prior to applying a clean dressing with:     [x] Normal Saline          [x] Mild Soap & Water     [] Keep Wound Dry in Shower  [] Wear a cast cover to shower  [] Other:        Topical Treatments:  Do not apply lotions, creams, or ointments to wound bed unless directed.      [] Apply moisturizing lotion to skin surrounding the wound prior to dressing change.  [] Apply thin film of moisture barrier ointment (Zinc) to skin immediately around wound.  [] Apply Betadine to skin immediately around wound   [] Apply Skin Prep to skin immediately around wound  [] Other:                 Dressings:                  Wound Location RIGHT AND LEFT LEGS  [x] Apply Primary Dressing:                                          [] MediHoney Gel  [x] Calcium Alginate with Silver   []

## 2024-11-19 NOTE — WOUND CARE
Wound Clinic Physician Orders and Discharge Instructions  [facility]  [address], [address]  [place] [address]  Telephone: [phone]     FAX [phone]    NAME:  Shantanu Casillas  YOB: 1955  MEDICAL RECORD NUMBER:  [id number]  DATE:  11/19/2024      Return Appointment:    [] Dressing Supply Provider:   [x] Home Healthcare: [facility]  [x] Return Appointment: 3 Week(s)  [] Nurse Visit:     [] Discharge from [facility]:   [] Healed        [] Refer to Provider:         [] Consult    Follow-up Information:    [] Ordered Tests:    [] Vascular/Arterial Testing   [] Please call [phone] to schedule at any [facility] facility      [] Imaging    [] Please call [phone] to schedule at any [facility] facility    [] Referrals:    [] Infectious Disease  [] Vascular  [] Other:    [] Rx to pharmacy:   [] Would Culture obtained in clinic  [] Other:       Wound Cleansing:   Do not scrub or use excessive force.  Cleanse wound prior to applying a clean dressing with:    [x] Normal Saline   [x] Mild Soap & Water     [] Keep Wound Dry in Shower  [] Wear a cast cover to shower  [] Other:       Topical Treatments:  Do not apply lotions, creams, or ointments to wound bed unless directed.     [] Apply moisturizing lotion to skin surrounding the wound prior to dressing change.  [] Apply thin film of moisture barrier ointment (Zinc) to skin immediately around wound.  [] Apply Betadine to skin immediately around wound   [] Apply Skin Prep to skin immediately around wound  [] Other:      Dressings:           Wound Location RIGHT AND LEFT LEGS  [x] Apply Primary Dressing:       [] MediHoney Gel  [x] Calcium Alginate with Silver   [] Calcium Alginate without silver   [] Collagen with silver [] Collagen without Silver    [] Santyl with moist saline gauze     [] Hydrofera Blue (cut to size and moistened with normal saline)  [] Hydrofera Blue Ready (cut to size)      [] Normal Saline wet to dry  []

## 2024-12-05 ENCOUNTER — HOSPITAL ENCOUNTER (OUTPATIENT)
Facility: HOSPITAL | Age: 69
Discharge: HOME | End: 2024-12-05
Attending: PODIATRIST
Payer: MEDICARE

## 2024-12-05 VITALS
SYSTOLIC BLOOD PRESSURE: 143 MMHG | RESPIRATION RATE: 20 BRPM | DIASTOLIC BLOOD PRESSURE: 90 MMHG | TEMPERATURE: 97.5 F | OXYGEN SATURATION: 98 %

## 2024-12-05 PROCEDURE — 11042 DBRDMT SUBQ TIS 1ST 20SQCM/<: CPT

## 2024-12-05 PROCEDURE — 17250 CHEM CAUT OF GRANLTJ TISSUE: CPT

## 2024-12-05 ASSESSMENT — PAIN SCALES - GENERAL: PAINLEVEL_OUTOF10: 7

## 2024-12-05 NOTE — DISCHARGE INSTRUCTIONS
Jeni Walker RN  Registered Nurse     Wound Care      Signed     Date of Service: 12/5/2024  2:15 PM     Signed                          Wound Clinic Physician Orders and Discharge Instructions  [facility]  [facility], [address]  [place] [address]  Telephone: [phone]     FAX [phone]     NAME:  Shantanu Casillas  YOB: 1955  MEDICAL RECORD NUMBER:  [id number]  DATE:  12/5/2024        Return Appointment:     [] Dressing Supply Provider:   [x] Home Healthcare: [facility]  [x] Return Appointment: 2 Week(s)  [] Nurse Visit:      [] Discharge from [facility]:   [] Healed        [] Refer to Provider:         [] Consult     Follow-up Information:     [] Ordered Tests:    [] Vascular/Arterial Testing    [] Please call [phone] to schedule at any [facility] facility       [] Imaging                          [] Please call [phone] to schedule at any [facility] facility     [] Referrals:     [] Infectious Disease             [] Vascular                 [] Other:     [] Rx to pharmacy:   [] Would Culture obtained in clinic  [] Other:         Wound Cleansing:   Do not scrub or use excessive force.  Cleanse wound prior to applying a clean dressing with:     [x] Normal Saline          [x] Mild Soap & Water     [] Keep Wound Dry in Shower  [] Wear a cast cover to shower  [] Other:        Topical Treatments:  Do not apply lotions, creams, or ointments to wound bed unless directed.      [] Apply moisturizing lotion to skin surrounding the wound prior to dressing change.  [] Apply thin film of moisture barrier ointment (Zinc) to skin immediately around wound.  [] Apply Betadine to skin immediately around wound   [] Apply Skin Prep to skin immediately around wound  [] Other:                 Dressings:                  Wound Location RIGHT AND LEFT LEGS  [x] Apply Primary Dressing:                                          [] MediHoney Gel  [x] Calcium Alginate with Silver   []

## 2024-12-05 NOTE — WOUND CARE
Wound Clinic Physician Orders and Discharge Instructions  [facility]  [address], [address]  [place] [address]  Telephone: [phone]     FAX [phone]    NAME:  Shantanu Casillas  YOB: 1955  MEDICAL RECORD NUMBER:  [id number]  DATE:  12/5/2024      Return Appointment:    [] Dressing Supply Provider:   [x] Home Healthcare: [facility]  [x] Return Appointment: 2 Week(s)  [] Nurse Visit:     [] Discharge from [facility]:   [] Healed        [] Refer to Provider:         [] Consult    Follow-up Information:    [] Ordered Tests:    [] Vascular/Arterial Testing   [] Please call [phone] to schedule at any [facility] facility      [] Imaging    [] Please call [phone] to schedule at any [facility] facility    [] Referrals:    [] Infectious Disease  [] Vascular  [] Other:    [] Rx to pharmacy:   [] Would Culture obtained in clinic  [] Other:       Wound Cleansing:   Do not scrub or use excessive force.  Cleanse wound prior to applying a clean dressing with:    [x] Normal Saline   [x] Mild Soap & Water     [] Keep Wound Dry in Shower  [] Wear a cast cover to shower  [] Other:       Topical Treatments:  Do not apply lotions, creams, or ointments to wound bed unless directed.     [] Apply moisturizing lotion to skin surrounding the wound prior to dressing change.  [] Apply thin film of moisture barrier ointment (Zinc) to skin immediately around wound.  [] Apply Betadine to skin immediately around wound   [] Apply Skin Prep to skin immediately around wound  [] Other:      Dressings:           Wound Location RIGHT AND LEFT LEGS  [x] Apply Primary Dressing:       [] MediHoney Gel  [x] Calcium Alginate with Silver   [] Calcium Alginate without silver   [] Collagen with silver [] Collagen without Silver    [] Santyl with moist saline gauze     [] Hydrofera Blue (cut to size and moistened with normal saline)  [] Hydrofera Blue Ready (cut to size)      [] Normal Saline wet to dry  []

## 2024-12-19 ENCOUNTER — HOSPITAL ENCOUNTER (OUTPATIENT)
Facility: HOSPITAL | Age: 69
Discharge: HOME | End: 2024-12-19
Attending: PODIATRIST
Payer: MEDICARE

## 2024-12-19 VITALS
TEMPERATURE: 97.7 F | OXYGEN SATURATION: 98 % | HEART RATE: 84 BPM | SYSTOLIC BLOOD PRESSURE: 168 MMHG | RESPIRATION RATE: 20 BRPM | DIASTOLIC BLOOD PRESSURE: 91 MMHG

## 2024-12-19 PROCEDURE — 11042 DBRDMT SUBQ TIS 1ST 20SQCM/<: CPT

## 2024-12-19 PROCEDURE — 11045 DBRDMT SUBQ TISS EACH ADDL: CPT

## 2024-12-19 NOTE — DISCHARGE INSTRUCTIONS
Jeni Walker RN  Registered Nurse     Wound Care      Signed     Date of Service: 12/19/2024  2:45 PM     Signed                          Wound Clinic Physician Orders and Discharge Instructions  [facility]  [facility]  [place] [address]  Telephone: [phone]     FAX [phone]     NAME:  Shantanu Casillas  YOB: 1955  MEDICAL RECORD NUMBER:  [id number]  DATE:  12/19/2024        Return Appointment:     [] Dressing Supply Provider:   [x] Home Healthcare: [facility]  [x] Return Appointment: 2 Week(s)  [] Nurse Visit:      [] Discharge from [facility]:   [] Healed        [] Refer to Provider:         [] Consult     Follow-up Information:     [] Ordered Tests:    [] Vascular/Arterial Testing    [] Please call [phone] to schedule at any [facility] facility       [] Imaging                          [] Please call [phone] to schedule at any [facility] facility     [] Referrals:     [] Infectious Disease             [] Vascular                 [] Other:     [] Rx to pharmacy:   [] Would Culture obtained in clinic  [] Other:         Wound Cleansing:   Do not scrub or use excessive force.  Cleanse wound prior to applying a clean dressing with:     [x] Normal Saline          [x] Mild Soap & Water     [] Keep Wound Dry in Shower  [] Wear a cast cover to shower  [] Other:        Topical Treatments:  Do not apply lotions, creams, or ointments to wound bed unless directed.      [] Apply moisturizing lotion to skin surrounding the wound prior to dressing change.  [] Apply thin film of moisture barrier ointment (Zinc) to skin immediately around wound.  [] Apply Betadine to skin immediately around wound   [] Apply Skin Prep to skin immediately around wound  [] Other:                 Dressings:                  Wound Location: RIGHT lateral leg AND LEFT lateral leg   [x] Apply Primary Dressing:                                          [] MediHoney Gel  [x] Calcium

## 2024-12-19 NOTE — WOUND CARE
Wound Clinic Physician Orders and Discharge Instructions  [facility]  [address], [address]  [place] [address]  Telephone: [phone]     FAX [phone]    NAME:  Shantanu Casillas  YOB: 1955  MEDICAL RECORD NUMBER:  [id number]  DATE:  12/19/2024      Return Appointment:    [] Dressing Supply Provider:   [x] Home Healthcare: [facility]  [x] Return Appointment: 2 Week(s)  [] Nurse Visit:     [] Discharge from [facility]:   [] Healed        [] Refer to Provider:         [] Consult    Follow-up Information:    [] Ordered Tests:    [] Vascular/Arterial Testing   [] Please call [phone] to schedule at any [facility] facility      [] Imaging    [] Please call [phone] to schedule at any [facility] facility    [] Referrals:    [] Infectious Disease  [] Vascular  [] Other:    [] Rx to pharmacy:   [] Would Culture obtained in clinic  [] Other:       Wound Cleansing:   Do not scrub or use excessive force.  Cleanse wound prior to applying a clean dressing with:    [x] Normal Saline   [x] Mild Soap & Water     [] Keep Wound Dry in Shower  [] Wear a cast cover to shower  [] Other:       Topical Treatments:  Do not apply lotions, creams, or ointments to wound bed unless directed.     [] Apply moisturizing lotion to skin surrounding the wound prior to dressing change.  [] Apply thin film of moisture barrier ointment (Zinc) to skin immediately around wound.  [] Apply Betadine to skin immediately around wound   [] Apply Skin Prep to skin immediately around wound  [] Other:      Dressings:           Wound Location: RIGHT lateral leg AND LEFT lateral leg   [x] Apply Primary Dressing:       [] MediHoney Gel  [x] Calcium Alginate with Silver   [] Calcium Alginate without silver   [] Collagen with silver [] Collagen without Silver    [] Santyl with moist saline gauze     [] Hydrofera Blue (cut to size and moistened with normal saline)  [] Hydrofera Blue Ready (cut to size)      [] Normal

## 2024-12-19 NOTE — WOUND CARE
Multilayer Compression Wrap   (Not Unna) Below the Knee    NAME:  Shantanu Casillas  YOB: 1955  MEDICAL RECORD NUMBER:  [id number]  DATE:  12/19/2024    Multilayer compression wrap: Removed old Multilayer wrap if indicated and wash leg with mild soap/water.  Applied moisturizing agent to dry skin as needed.   Applied primary and secondary dressing as ordered.  Applied multilayered dressing below the knee to right lower leg.  Applied multilayered dressing below the knee to left lower leg.  Instructed patient/caregiver not to remove dressing and to keep it clean and dry.   Instructed patient/caregiver on complications to report to provider, such as pain, numbness in toes, heavy drainage, and slippage of dressing.  Instructed patient on purpose of compression dressing and on activity and exercise recommendations.      Electronically signed by Maryuri Martines RN on 12/19/2024 at 3:16 PM

## 2025-01-02 ENCOUNTER — HOSPITAL ENCOUNTER (OUTPATIENT)
Facility: HOSPITAL | Age: 70
Discharge: HOME | End: 2025-01-02
Attending: PODIATRIST
Payer: MEDICARE

## 2025-01-02 VITALS
OXYGEN SATURATION: 95 % | RESPIRATION RATE: 18 BRPM | SYSTOLIC BLOOD PRESSURE: 139 MMHG | DIASTOLIC BLOOD PRESSURE: 80 MMHG | TEMPERATURE: 99 F | HEART RATE: 104 BPM

## 2025-01-02 DIAGNOSIS — L97.922 CHRONIC ULCER OF LEFT LEG WITH FAT LAYER EXPOSED (HCC): Primary | ICD-10-CM

## 2025-01-02 PROCEDURE — 11042 DBRDMT SUBQ TIS 1ST 20SQCM/<: CPT

## 2025-01-02 PROCEDURE — 11045 DBRDMT SUBQ TISS EACH ADDL: CPT

## 2025-01-02 ASSESSMENT — PAIN DESCRIPTION - LOCATION: LOCATION: BACK;ARM

## 2025-01-02 ASSESSMENT — PAIN SCALES - GENERAL: PAINLEVEL_OUTOF10: 6

## 2025-01-02 NOTE — DISCHARGE INSTRUCTIONS
health agency and remove wrap from leg by unrolling each layer.              [x] Elevate leg(s) above the level of the heart when sitting.                 [x] Avoid prolonged standing in one place.     Off-Loading:   [] Off-loading when:   [] walking       [] in bed         [] sitting  [] Total non-weight bearing  [] Right Leg  [] Left Leg          [] Assistive Device     [] Walker        [] Cane      [] Wheelchair      [] Crutches              [] Surgical shoe    [] Wedge Shoe  [] Foam Boot(s)  [] Knee Scooter              [] Cast Boot   [] CROW Boot     [] Diabetic Shoes    [] Offloading heel boot  [] Other:      Contact Cast:  Apply:  [] Total Contact Cast Applied in Clinic to      []RightLeg      []Left Leg              [] Do not get cast wet.  Contact wound center or go to emergency room if there is a foul odor or becomes uncomfortable due to feeling tight or swelling.  Do not use objects inside of cast to scratch.                  Dietary:  [x] Diet as tolerated     [x] Diabetic Diet           [] No Added Salt  [] Increase Protein     [] Other:              Activity:  [x] Activity as tolerated  [] Patient has no activity restrictions      [] Strict Bedrest           [] Remain off Work:       [] May return to full duty work                                     [] Return to work with restrictions:         Physician:  [] Dr. Drea Moore  [] Dr. Carol Green   [] Dr. Martin Zheng  [x] Orion Garces PA-C  [] Dr. Wei Saenz  [] Dr. Polo Oviedo  [] Dr. Rosalee Toney     Nurse Case Manger:  Jeni \"T\"         Wound Care Center Information: Should you experience any significant changes in your wound(s) or have questions about your wound care, please contact the Wound Center at [phone]. Our hours are Monday - Friday 8am - 4:30pm, closed all major holidays. If you need help with your wound outside these hours and cannot wait until we are again available, contact your PCP or go to the hospital

## 2025-01-02 NOTE — WOUND CARE
Multilayer Compression Wrap   (Not Unna) Below the Knee    NAME:  Shantanu Casillas  YOB: 1955  MEDICAL RECORD NUMBER:  [id number]  DATE:  1/2/2025    Multilayer compression wrap: Removed old Multilayer wrap if indicated and wash leg with mild soap/water.  Applied moisturizing agent to dry skin as needed.   Applied primary and secondary dressing as ordered.  Applied multilayered dressing below the knee to right lower leg.  Applied multilayered dressing below the knee to left lower leg.  Instructed patient/caregiver not to remove dressing and to keep it clean and dry.   Instructed patient/caregiver on complications to report to provider, such as pain, numbness in toes, heavy drainage, and slippage of dressing.  Instructed patient on purpose of compression dressing and on activity and exercise recommendations.      Electronically signed by Chiara Damon LPN on 1/2/2025 at 2:38 PM  
normal saline)  [] Hydrofera Blue Ready (cut to size)      [] Normal Saline wet to dry  [] Betadine wet to dry    [] Hydrogel       [] Bactroban/Mupirocin   [] Iodoform Packing Strip [] Plain Packing Strip   [] Skin Sub, Steri-Strips, Mepitel (DO NOT REMOVE)  [] Other:      [] Cover and Secure with:     [x] Gauze [] Aris [] Kerlix   [] Zetuvit Plus Silicone Border or Foam Border [x] Super Absorbant [] ABD     [] Ace Wrap [] Other:    Limit contact of tape with skin.    [] Change dressing: [] Daily    [] Every Other Day   [] Twice per week     [x] Three times per week   [] Once a week [] Do Not Change Dressing   [] Other:          Edema Control:  Apply: [] Compression Stocking:  mmHg  []Right Leg []Left Leg   [] Tubigrip []Right Leg Double Layer []Left Leg Double Layer      []Right Leg Single Layer []Left Leg Single Layer      [x] Elevate leg(s) above the level of the heart when sitting.    [x] Avoid prolonged standing in one place.     Compression:  Apply: [x] Compression Wrap to: [x]RightLeg [x]Left Leg    []  2 Layer Compression 30-40mmHg [x]  2 Layer compression Light 20-30 mmHg  [] Unna with Calamine     [x] Do not get leg(s) with wrap wet. If wrap becomes too tight, call the wound center or home health agency and remove wrap from leg by unrolling each layer.   [x] Elevate leg(s) above the level of the heart when sitting.    [x] Avoid prolonged standing in one place.    Off-Loading:   [] Off-loading when: [] walking  [] in bed [] sitting  [] Total non-weight bearing  [] Right Leg  [] Left Leg   [] Assistive Device [] Walker [] Cane      [] Wheelchair  [] Crutches   [] Surgical shoe    [] Wedge Shoe  [] Foam Boot(s)  [] Knee Scooter    [] Cast Boot [] CROW Boot     [] Diabetic Shoes    [] Offloading heel boot  [] Other:     Contact Cast:  Apply: [] Total Contact Cast Applied in Clinic to []RightLeg []Left Leg   [] Do not get cast wet.  Contact wound center or go to emergency room if there is a foul odor or 
atorvastatin (LIPITOR) 40 MG tablet Take 1 tablet by mouth nightly at bedtime.      B Complex Vitamins (VITAMIN B COMPLEX) TABS Take 1 tablet by mouth daily      fluticasone-umeclidin-vilant (TRELEGY ELLIPTA) 100-62.5-25 MCG/ACT AEPB inhaler INHALE 1 INHALATION BY MOUTH ONCE DAILY      vitamin E 400 UNIT capsule Take 1 capsule by mouth 2 times daily       No current facility-administered medications on file prior to encounter.       Objective:    /80   Pulse (!) 104   Temp 99 °F (37.2 °C) (Temporal)   Resp 18   SpO2 95%   Wt Readings from Last 3 Encounters:   09/14/24 (!) 152 kg (335 lb 1.6 oz)   08/23/24 (!) 166.2 kg (366 lb 6.5 oz)   07/30/24 (!) 161.9 kg (357 lb)       PHYSICAL EXAM  General: Alert and in no acute distress. Normal appearing  Skin: Warm and dry, no rash  Head: Normocephalic and atraumatic  Eyes: Extraocular eye movements intact, conjunctivae normal, and sclera anicteric  ENT: Hearing grossly normal bilaterally. Normal appearance  Respiratory: no chest wall tenderness. no respiratory distress  GI: Abdomen non-tender and benign  Musculoskeletal: Baseline range of motion in joints. Nontender calves. No cyanosis.  Edema:  Neurologic: Speech normal. At baseline without new focal deficits. Mental status normal or at baseline  Wound: Bilateral lower extremity wounds     Wound 10/17/24 Leg Right;Anterior #1 (Active)   Wound Image   01/02/25 1407   Wound Etiology Venous 01/02/25 1407   Dressing Status Clean;Dry;Intact 01/02/25 1407   Wound Cleansed Soap and water 01/02/25 1407   Dressing/Treatment Alginate with Ag;Dry dressing;ABD;Other (comment) 12/19/24 1513   Wound Length (cm) 4.3 cm 01/02/25 1407   Wound Width (cm) 5 cm 01/02/25 1407   Wound Depth (cm) 0.2 cm 01/02/25 1407   Wound Surface Area (cm^2) 21.5 cm^2 01/02/25 1407   Change in Wound Size % (l*w) 69.29 01/02/25 1407   Wound Volume (cm^3) 4.3 cm^3 01/02/25 1407   Wound Healing % 39 01/02/25 1407   Post-Procedure Length (cm) 4.3 cm

## 2025-01-16 ENCOUNTER — HOSPITAL ENCOUNTER (OUTPATIENT)
Facility: HOSPITAL | Age: 70
Discharge: HOME | End: 2025-01-16
Attending: PODIATRIST
Payer: MEDICARE

## 2025-01-16 VITALS
HEART RATE: 83 BPM | RESPIRATION RATE: 20 BRPM | DIASTOLIC BLOOD PRESSURE: 85 MMHG | OXYGEN SATURATION: 99 % | SYSTOLIC BLOOD PRESSURE: 161 MMHG | TEMPERATURE: 97.2 F

## 2025-01-16 PROCEDURE — 11042 DBRDMT SUBQ TIS 1ST 20SQCM/<: CPT

## 2025-01-16 ASSESSMENT — PAIN SCALES - GENERAL: PAINLEVEL_OUTOF10: 4

## 2025-01-16 ASSESSMENT — PAIN DESCRIPTION - LOCATION: LOCATION: GENERALIZED

## 2025-01-16 NOTE — WOUND CARE
Wound Clinic Physician Orders and Discharge Instructions  [facility]  [address], [address]  [place] [address]  Telephone: [phone]     FAX [phone]    NAME:  Shantanu Casillas  YOB: 1955  MEDICAL RECORD NUMBER:  [id number]  DATE:  1/16/2025      Return Appointment:    [] Dressing Supply Provider:   [x] Home Healthcare: [facility]  [x] Return Appointment: 2 Week(s)  [] Nurse Visit:     [] Discharge from [facility]:   [] Healed        [] Refer to Provider:         [] Consult    Follow-up Information:    [] Ordered Tests:    [] Vascular/Arterial Testing   [] Please call [phone] to schedule at any [facility] facility      [x] Imaging, Left leg x-ray, may complete as a walk in at any [facility] facility with imaging services. Ordered 1/2/25, patient reminded to complete 1/16/25.       [] Please call [phone] to schedule at any [facility] facility    [] Referrals:    [] Infectious Disease  [] Vascular  [] Other:    [] Rx to pharmacy:   [] Would Culture obtained in clinic  [] Other:       Wound Cleansing:   Do not scrub or use excessive force.  Cleanse wound prior to applying a clean dressing with:    [x] Normal Saline   [x] Mild Soap & Water     [] Keep Wound Dry in Shower  [] Wear a cast cover to shower  [] Other:       Topical Treatments:  Do not apply lotions, creams, or ointments to wound bed unless directed.     [] Apply moisturizing lotion to skin surrounding the wound prior to dressing change.  [] Apply thin film of moisture barrier ointment (Zinc) to skin immediately around wound.  [] Apply Betadine to skin immediately around wound   [] Apply Skin Prep to skin immediately around wound  [] Other:      Dressings:           Wound Location: RIGHT lateral leg, LEFT lateral leg  [x] Apply Primary Dressing:       [] MediHoney Gel  [x] Calcium Alginate with Silver   [] Calcium Alginate without silver   [] Collagen with silver [] Collagen without Silver    [] Santyl with 
Multilayer Compression Wrap   (Not Unna) Below the Knee    NAME:  Shantanu Casillas  YOB: 1955  MEDICAL RECORD NUMBER:  [id number]  DATE:  1/16/2025    Multilayer compression wrap: Removed old Multilayer wrap if indicated and wash leg with mild soap/water.  Applied moisturizing agent to dry skin as needed.   Applied primary and secondary dressing as ordered.  Applied multilayered dressing below the knee to right lower leg.  Applied multilayered dressing below the knee to left lower leg.  Instructed patient/caregiver not to remove dressing and to keep it clean and dry.   Instructed patient/caregiver on complications to report to provider, such as pain, numbness in toes, heavy drainage, and slippage of dressing.  Instructed patient on purpose of compression dressing and on activity and exercise recommendations.      Electronically signed by Chiara Damon LPN on 1/16/2025 at 3:33 PM  
Serosanguinous 01/16/25 1455   Odor None 01/16/25 1455   Emy-wound Assessment Hypopigmented 01/16/25 1455   Margins Epibole (rolled edges) 01/16/25 1455   Wound Thickness Description not for Pressure Injury Full thickness 01/16/25 1455   Number of days: 91        Total Surface Area Debrided:  26 sq cm   Estimated Blood Loss: Minimal amount blood loss .  Hemostasis Achieved:  by pressure  Procedural Pain: 0  / 10   Post Procedural Pain: 0 / 10   Response to treatment: Patient tolerated procedure well with no complaints of pain.     Electronically signed by Polo Oviedo DPM on 1/16/2025 at 3:33 PM

## 2025-01-16 NOTE — DISCHARGE INSTRUCTIONS
Jeni Walker, RN  Registered Nurse     Wound Care      Signed     Date of Service: 1/16/2025  2:15 PM     Signed                          Wound Clinic Physician Orders and Discharge Instructions  [facility]  [facility], [address] [place] [address]  Telephone: [phone]     FAX [phone]     NAME:  Shantanu Casillas  YOB: 1955  MEDICAL RECORD NUMBER:  [id number]  DATE:  1/16/2025        Return Appointment:     [] Dressing Supply Provider:   [x] Home Healthcare: [facility]  [x] Return Appointment: 2 Week(s)  [] Nurse Visit:      [] Discharge from [facility]:   [] Healed        [] Refer to Provider:         [] Consult     Follow-up Information:     [] Ordered Tests:    [] Vascular/Arterial Testing    [] Please call [phone] to schedule at any [facility] facility       [x] Imaging, Left leg x-ray, may complete as a walk in at any [facility] facility with imaging services. Ordered 1/2/25, patient reminded to complete 1/16/25.                              [] Please call [phone] to schedule at any [facility] facility     [] Referrals:     [] Infectious Disease             [] Vascular                 [] Other:     [] Rx to pharmacy:   [] Would Culture obtained in clinic  [] Other:         Wound Cleansing:   Do not scrub or use excessive force.  Cleanse wound prior to applying a clean dressing with:     [x] Normal Saline          [x] Mild Soap & Water     [] Keep Wound Dry in Shower  [] Wear a cast cover to shower  [] Other:        Topical Treatments:  Do not apply lotions, creams, or ointments to wound bed unless directed.      [] Apply moisturizing lotion to skin surrounding the wound prior to dressing change.  [] Apply thin film of moisture barrier ointment (Zinc) to skin immediately around wound.  [] Apply Betadine to skin immediately around wound   [] Apply Skin Prep to skin immediately around wound  [] Other:                 Dressings:                  Wound Location:

## 2025-02-05 ENCOUNTER — HOSPITAL ENCOUNTER (OUTPATIENT)
Facility: HOSPITAL | Age: 70
Discharge: HOME | End: 2025-02-08
Payer: MEDICARE

## 2025-02-05 DIAGNOSIS — L97.922 CHRONIC ULCER OF LEFT LEG WITH FAT LAYER EXPOSED (HCC): ICD-10-CM

## 2025-02-05 PROCEDURE — 73590 X-RAY EXAM OF LOWER LEG: CPT

## 2025-02-13 ENCOUNTER — HOSPITAL ENCOUNTER (OUTPATIENT)
Facility: HOSPITAL | Age: 70
Discharge: HOME | End: 2025-02-13
Attending: PODIATRIST
Payer: MEDICARE

## 2025-02-13 VITALS
TEMPERATURE: 97.3 F | SYSTOLIC BLOOD PRESSURE: 134 MMHG | HEART RATE: 78 BPM | RESPIRATION RATE: 20 BRPM | DIASTOLIC BLOOD PRESSURE: 76 MMHG

## 2025-02-13 PROCEDURE — 11042 DBRDMT SUBQ TIS 1ST 20SQCM/<: CPT

## 2025-02-13 ASSESSMENT — PAIN DESCRIPTION - LOCATION: LOCATION: LEG

## 2025-02-13 ASSESSMENT — PAIN SCALES - GENERAL: PAINLEVEL_OUTOF10: 5

## 2025-02-13 ASSESSMENT — PAIN DESCRIPTION - ORIENTATION: ORIENTATION: RIGHT;LEFT

## 2025-02-13 NOTE — WOUND CARE
Multilayer Compression Wrap   (Not Unna) Below the Knee    NAME:  Shantanu Casillas  YOB: 1955  MEDICAL RECORD NUMBER:  [id number]  DATE:  2/13/2025    Multilayer compression wrap: Removed old Multilayer wrap if indicated and wash leg with mild soap/water.  Applied moisturizing agent to dry skin as needed.   Applied primary and secondary dressing as ordered.  Applied multilayered dressing below the knee to right lower leg.  Applied multilayered dressing below the knee to left lower leg.  Instructed patient/caregiver not to remove dressing and to keep it clean and dry.   Instructed patient/caregiver on complications to report to provider, such as pain, numbness in toes, heavy drainage, and slippage of dressing.  Instructed patient on purpose of compression dressing and on activity and exercise recommendations.      Electronically signed by Maryuri Martines RN on 2/13/2025 at 3:20 PM

## 2025-02-13 NOTE — DISCHARGE INSTRUCTIONS
Jeni Walker RN  Registered Nurse     Wound Care      Signed     Date of Service: 2/13/2025  2:30 PM     Signed                          Wound Clinic Physician Orders and Discharge Instructions  [facility]  [facility], [address]  [place] [address]  Telephone: [phone]     FAX [phone]     NAME:  Shantanu Casillas  YOB: 1955  MEDICAL RECORD NUMBER:  [id number]  DATE:  2/13/2025        Return Appointment:     [] Dressing Supply Provider:   [x] Home Healthcare: [facility]  [x] Return Appointment: 2 Week(s)  [] Nurse Visit:      [] Discharge from [facility]:   [] Healed        [] Refer to Provider:         [] Consult     Follow-up Information:     [] Ordered Tests:    [] Vascular/Arterial Testing    [] Please call [phone] to schedule at any [facility] facility       [] Imaging                             [] Please call [phone] to schedule at any [facility] facility     [] Referrals:     [] Infectious Disease             [] Vascular                 [] Other:     [] Rx to pharmacy:   [] Would Culture obtained in clinic  [] Other:         Wound Cleansing:   Do not scrub or use excessive force.  Cleanse wound prior to applying a clean dressing with:     [x] Normal Saline          [x] Mild Soap & Water     [] Keep Wound Dry in Shower  [] Wear a cast cover to shower  [] Other:        Topical Treatments:  Do not apply lotions, creams, or ointments to wound bed unless directed.      [] Apply moisturizing lotion to skin surrounding the wound prior to dressing change.  [] Apply thin film of moisture barrier ointment (Zinc) to skin immediately around wound.  [] Apply Betadine to skin immediately around wound   [] Apply Skin Prep to skin immediately around wound  [] Other:                 Dressings:                  Wound Location: LEFT lateral leg and LEFT dorsal foot   [x] Apply Primary Dressing:                                          [] MediHoney Gel  [x] Calcium

## 2025-02-13 NOTE — WOUND CARE
Wound Clinic Physician Orders and Discharge Instructions  [facility]  [address], [address]  [place] [address]  Telephone: [phone]     FAX [phone]    NAME:  Shantanu Casillas  YOB: 1955  MEDICAL RECORD NUMBER:  [id number]  DATE:  2/13/2025      Return Appointment:    [] Dressing Supply Provider:   [x] Home Healthcare: [facility]  [x] Return Appointment: 2 Week(s)  [] Nurse Visit:     [] Discharge from Health system:   [] Healed        [] Refer to Provider:         [] Consult    Follow-up Information:    [] Ordered Tests:    [] Vascular/Arterial Testing   [] Please call [phone] to schedule at any [facility] facility      [] Imaging      [] Please call [phone] to schedule at any [facility] facility    [] Referrals:    [] Infectious Disease  [] Vascular  [] Other:    [] Rx to pharmacy:   [] Would Culture obtained in clinic  [] Other:       Wound Cleansing:   Do not scrub or use excessive force.  Cleanse wound prior to applying a clean dressing with:    [x] Normal Saline   [x] Mild Soap & Water     [] Keep Wound Dry in Shower  [] Wear a cast cover to shower  [] Other:       Topical Treatments:  Do not apply lotions, creams, or ointments to wound bed unless directed.     [] Apply moisturizing lotion to skin surrounding the wound prior to dressing change.  [] Apply thin film of moisture barrier ointment (Zinc) to skin immediately around wound.  [] Apply Betadine to skin immediately around wound   [] Apply Skin Prep to skin immediately around wound  [] Other:      Dressings:           Wound Location: LEFT lateral leg and LEFT dorsal foot   [x] Apply Primary Dressing:       [] MediHoney Gel  [x] Calcium Alginate with Silver   [] Calcium Alginate without silver   [] Collagen with silver [] Collagen without Silver    [] Santyl with moist saline gauze     [] Hydrofera Blue (cut to size and moistened with normal saline)  [] Hydrofera Blue Ready (cut to size)      [] Normal

## 2025-07-13 NOTE — ED NOTES
ED TO INPATIENT SBAR HANDOFF    Patient Name: Shantanu Casillas   Preferred Name: Shantanu  : 1955  68 y.o.   Family/Caregiver Present: no   Code Status Order: Prior  PO Status: NPO:No  Telemetry Order:   C-SSRS: Risk of Suicide: No Risk  Sitter no   Restraints:     Sepsis Risk Score      Situation  Chief Complaint   Patient presents with    Shortness of Breath     Since last night patient is having SOB, Patient was DX with COVID a week ago. Patient  also is on ATB for UTI. Here for further evaluation.      Brief Description of Patient's Condition: stable with SOB  Mental Status: oriented and alert  Arrived from:Home  Imaging:   XR CHEST PORTABLE   Final Result   Cardiomegaly and mild pulmonary edema.      Electronically signed by Nasir Sebastian        Abnormal labs:   Abnormal Labs Reviewed   CBC WITH AUTO DIFFERENTIAL - Abnormal; Notable for the following components:       Result Value    RBC 2.84 (*)     Hemoglobin 8.5 (*)     Hematocrit 27.2 (*)     RDW 20.1 (*)     Nucleated RBCs 0.4 (*)     nRBC 0.03 (*)     Immature Granulocytes % 1 (*)     Immature Granulocytes Absolute 0.1 (*)     All other components within normal limits   COMPREHENSIVE METABOLIC PANEL - Abnormal; Notable for the following components:    Glucose 133 (*)     BUN 67 (*)     Creatinine 3.66 (*)     Est, Glom Filt Rate 17 (*)     Albumin 2.5 (*)     Globulin 4.2 (*)     Albumin/Globulin Ratio 0.6 (*)     All other components within normal limits   BRAIN NATRIURETIC PEPTIDE - Abnormal; Notable for the following components:    NT Pro-BNP 3,528 (*)     All other components within normal limits       Background  Allergies:   Allergies   Allergen Reactions    Amitriptyline Angioedema    Naproxen      Other reaction(s): Unknown (comments)  Pt not sure of reaction    Sulfa Antibiotics Nausea And Vomiting     History:   Past Medical History:   Diagnosis Date    Arthritis     CAD (coronary artery disease)     Chronic obstructive pulmonary disease  Yes